# Patient Record
Sex: FEMALE | Race: WHITE | NOT HISPANIC OR LATINO | Employment: OTHER | URBAN - METROPOLITAN AREA
[De-identification: names, ages, dates, MRNs, and addresses within clinical notes are randomized per-mention and may not be internally consistent; named-entity substitution may affect disease eponyms.]

---
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
## 2019-09-24 ENCOUNTER — TRANSFERRED RECORDS (OUTPATIENT)
Dept: HEALTH INFORMATION MANAGEMENT | Facility: CLINIC | Age: 19
End: 2019-09-24

## 2019-11-07 NOTE — ED AVS SNAPSHOT
[facility], [place], Emergency Department    [address]    [facility] [address]    Phone:  [phone]    Fax:  [phone]                                       Sadaf Ross   MRN: [id number]    Department:  [facility], Emergency Department   Date of Visit:  7/17/2017           After Visit Summary Signature Page     I have received my discharge instructions, and my questions have been answered. I have discussed any challenges I see with this plan with the nurse or doctor.    ..........................................................................................................................................  Patient/Patient Representative Signature      ..........................................................................................................................................  Patient Representative Print Name and Relationship to Patient    ..................................................               ................................................  Date                                            Time    ..........................................................................................................................................  Reviewed by Signature/Title    ...................................................              ..............................................  Date                                                            Time           Depth Of Tumor Invasion (For Histology): dermis

## 2019-11-18 ENCOUNTER — HOSPITAL ENCOUNTER (EMERGENCY)
Facility: CLINIC | Age: 19
Discharge: HOME | End: 2019-11-18
Attending: EMERGENCY MEDICINE | Admitting: EMERGENCY MEDICINE
Payer: COMMERCIAL

## 2019-11-18 VITALS
DIASTOLIC BLOOD PRESSURE: 68 MMHG | TEMPERATURE: 97.3 F | SYSTOLIC BLOOD PRESSURE: 138 MMHG | RESPIRATION RATE: 16 BRPM | HEART RATE: 68 BPM | OXYGEN SATURATION: 100 %

## 2019-11-18 DIAGNOSIS — F43.29 ADJUSTMENT DISORDER WITH MIXED EMOTIONAL FEATURES: ICD-10-CM

## 2019-11-18 LAB
AMPHETAMINES UR QL SCN: NEGATIVE
BARBITURATES UR QL: NEGATIVE
BENZODIAZ UR QL: NEGATIVE
CANNABINOIDS UR QL SCN: NEGATIVE
COCAINE UR QL: NEGATIVE
ETHANOL UR QL SCN: NEGATIVE
HCG UR QL: NEGATIVE
OPIATES UR QL SCN: NEGATIVE

## 2019-11-18 PROCEDURE — 99284 EMERGENCY DEPT VISIT MOD MDM: CPT | Mod: Z6 | Performed by: EMERGENCY MEDICINE

## 2019-11-18 PROCEDURE — 80320 DRUG SCREEN QUANTALCOHOLS: CPT | Performed by: FAMILY MEDICINE

## 2019-11-18 PROCEDURE — 99283 EMERGENCY DEPT VISIT LOW MDM: CPT | Performed by: EMERGENCY MEDICINE

## 2019-11-18 PROCEDURE — 81025 URINE PREGNANCY TEST: CPT | Performed by: FAMILY MEDICINE

## 2019-11-18 PROCEDURE — 80307 DRUG TEST PRSMV CHEM ANLYZR: CPT | Performed by: FAMILY MEDICINE

## 2019-11-18 RX ORDER — VENLAFAXINE HYDROCHLORIDE 150 MG/1
300 CAPSULE, EXTENDED RELEASE ORAL DAILY
Status: ON HOLD | COMMUNITY
Start: 2019-08-09 | End: 2019-11-29

## 2019-11-18 NOTE — DISCHARGE INSTRUCTIONS
Thank you for coming to the [facility] Emergency Department.     Please continue your medications without change.   Contact your therapist to arrange a visit as soon as possible.     Please see your primary care doctor for medical check up if having any new medical concerns.     Return to the ER at any time if you have thoughts of suicide or if self-injurious behavior is worsening.

## 2019-11-18 NOTE — ED AVS SNAPSHOT
[facility], [place], Emergency Department  [address]  [facility] [address]  Phone:  [phone]  Fax:  [phone]                                    Sadaf Ross   MRN: [id number]    Department:  [facility], Emergency Department   Date of Visit:  11/18/2019           After Visit Summary Signature Page    I have received my discharge instructions, and my questions have been answered. I have discussed any challenges I see with this plan with the nurse or doctor.    ..........................................................................................................................................  Patient/Patient Representative Signature      ..........................................................................................................................................  Patient Representative Print Name and Relationship to Patient    ..................................................               ................................................  Date                                   Time    ..........................................................................................................................................  Reviewed by Signature/Title    ...................................................              ..............................................  Date                                               Time          22EPIC Rev 08/18

## 2019-11-18 NOTE — ED NOTES
Bed: HW03  Expected date: 11/18/19  Expected time: 1:23 PM  Means of arrival: Ambulance  Comments:  634 20yo female, suicidal, calm and cooperative

## 2019-11-18 NOTE — ED TRIAGE NOTES
Pt endorses SI today after her sister leaving for grandparents house, wanted to go with sister. Self harm with pin and biting self. Hx of SI.

## 2019-11-19 ENCOUNTER — HOSPITAL ENCOUNTER (INPATIENT)
Facility: CLINIC | Age: 19
LOS: 9 days | Discharge: HOME | DRG: 885 | End: 2019-11-29
Attending: PSYCHIATRY & NEUROLOGY | Admitting: PSYCHIATRY & NEUROLOGY
Payer: COMMERCIAL

## 2019-11-19 DIAGNOSIS — R45.851 SUICIDAL IDEATION: ICD-10-CM

## 2019-11-19 DIAGNOSIS — F60.3 BORDERLINE PERSONALITY DISORDER (H): ICD-10-CM

## 2019-11-19 DIAGNOSIS — F79 INTELLECTUAL DISABILITY: ICD-10-CM

## 2019-11-19 DIAGNOSIS — F17.200 NICOTINE USE DISORDER: Primary | ICD-10-CM

## 2019-11-19 DIAGNOSIS — F33.41 MAJOR DEPRESSIVE DISORDER, RECURRENT EPISODE, IN PARTIAL REMISSION (H): ICD-10-CM

## 2019-11-19 DIAGNOSIS — R45.851 SUICIDAL THOUGHTS: ICD-10-CM

## 2019-11-19 DIAGNOSIS — R45.850 HOMICIDAL IDEATION: ICD-10-CM

## 2019-11-19 PROCEDURE — 99285 EMERGENCY DEPT VISIT HI MDM: CPT | Mod: Z6 | Performed by: PSYCHIATRY & NEUROLOGY

## 2019-11-19 PROCEDURE — 80320 DRUG SCREEN QUANTALCOHOLS: CPT | Performed by: PSYCHIATRY & NEUROLOGY

## 2019-11-19 PROCEDURE — 90791 PSYCH DIAGNOSTIC EVALUATION: CPT

## 2019-11-19 PROCEDURE — 81025 URINE PREGNANCY TEST: CPT | Performed by: PSYCHIATRY & NEUROLOGY

## 2019-11-19 PROCEDURE — 25000132 ZZH RX MED GY IP 250 OP 250 PS 637

## 2019-11-19 PROCEDURE — 99285 EMERGENCY DEPT VISIT HI MDM: CPT | Mod: 25

## 2019-11-19 PROCEDURE — 80307 DRUG TEST PRSMV CHEM ANLYZR: CPT | Performed by: PSYCHIATRY & NEUROLOGY

## 2019-11-19 RX ORDER — OLANZAPINE 10 MG/1
TABLET, ORALLY DISINTEGRATING ORAL
Status: COMPLETED
Start: 2019-11-19 | End: 2019-11-19

## 2019-11-19 RX ORDER — OLANZAPINE 10 MG/2ML
10 INJECTION, POWDER, FOR SOLUTION INTRAMUSCULAR ONCE
Status: DISCONTINUED | OUTPATIENT
Start: 2019-11-19 | End: 2019-11-20 | Stop reason: CLARIF

## 2019-11-19 RX ADMIN — OLANZAPINE 10 MG: 10 TABLET, ORALLY DISINTEGRATING ORAL at 19:46

## 2019-11-19 NOTE — ED PROVIDER NOTES
[facility] EMERGENCY DEPARTMENT ([facility])     November 18, 2019    History     Chief Complaint   Patient presents with     Suicidal     HPI  Sadaf Ross is a 19 year old female with a history of bipolar disorder.  She gets mental health care through [facility] with a psychiatrist and also a therapist. Her next therapy appointment is 12/11/2019.  She is treated with Effexor and Seroquel.  The patient has been doing well.  She has been living at her mom and dad's house with her little sister.  She is been able to be functional enough to take classes in to become a  and has been compliant with her meds and appointments.  The last 2 days have been hard for her.  She has been tearful and crying triggered by the fact that her sister left to go on a vacation for 3 weeks and she misses her a lot.  Today when she was upset, she did some mild self-injury with scratching and biting her left wrist.  She does not have any deep wounds anything that requires closure.  She does not have any other thoughts of hurting herself.  She denies to me a history of intentional suicide attempts.  No psychotic features.  Denies recent cough, cold, URI symptoms or other new infectious concerns.  In addition to bipolar, on her chart is a diagnosis of ADHD.  Since being here in the Emergency Department she feels calm and she has not continued to attempt to hurt herself. She does not have a plan to hurt herself and feels safe in her home.  She is forward directed and interested in going home getting some sleep and going to class tomorrow.  She reports her appetite and sleeping have been normal.      This part of the medical record was transcribed by Divya Snyder Medical Scribe, from a dictation done by Christine Murphy MD.     Past Medical History:   Diagnosis Date     ADHD (attention deficit hyperactivity disorder)      No past surgical history on file.    No family history on file.    Social History      Tobacco Use     Smoking status: Current Some Day Smoker     Years: 5.00     Smokeless tobacco: Never Used   Substance Use Topics     Alcohol use: No     No current facility-administered medications for this encounter.      Current Outpatient Medications   Medication     hydrOXYzine (ATARAX) 10 MG tablet     QUEtiapine (SEROQUEL XR) 300 MG 24 hr tablet     venlafaxine (EFFEXOR-XR) 150 MG 24 hr capsule     FLUoxetine HCl (PROZAC PO)     VITAMIN D, CHOLECALCIFEROL, PO     Allergies   Allergen Reactions     Penicillins Rash     I have reviewed the Medications, Allergies, Past Medical and Surgical History, and Social History in the Epic system.    Review of Systems   Constitutional: Negative for appetite change, chills and fever.   HENT: Negative.    Respiratory: Negative.    Gastrointestinal: Negative.    Skin: Positive for wound.   Psychiatric/Behavioral: Positive for dysphoric mood and self-injury. Negative for suicidal ideas.          Physical Exam   BP: 134/79  Pulse: 94  Temp: 97  F (36.1  C)  Resp: 16  SpO2: 99 %      Physical Exam   Gen:A&Ox3, no acute distress  HEENT:PERRL, no facial tenderness or wounds, head atraumatic  CV:RRR without murmurs  PULM:Clear to auscultation bilaterally  Abd:soft, nontender, nondistended. Bowel sounds present and normal  UE: scratches to left wrist  LE:no traumatic injuries, skin normal  Neuro:CN II-XII intact, strength 5/5 throughout, gait stable.   Skin: no rashes or ecchymoses  Psych: eye contact good, affect slightly flat, not responding to internal stimuli, speech linear      ED Course        Procedures             Critical Care time:  none         Labs Ordered and Resulted from Time of ED Arrival Up to the Time of Departure from the ED   HCG QUALITATIVE URINE   DRUG ABUSE SCREEN 6 CHEM DEP URINE ([facility])            Assessments & Plan (with Medical Decision Making)   Patient is a 19-year-old female with a history of bipolar disorder presenting with adjustment disorder.  Her  behavior has decompensated slightly in the last 2 days due to the stressor of her sister leaving for vacation.  She has done mild self-injury but denies ongoing suicidal ideation, no psychotic features.  She does feel that she can be safe and not harm herself in the home.  Collateral information was obtained from the patient's mother and the family feels comfortable with her coming back to the home.  The mother will come and pick her up. I will have her continue her medications, follow-up with her psychiatrist and therapist. She and her family are to monitor for increasing self-injurious behavior or return of suicidal ideation as reasons to return for reevaluation.     This part of the medical record was transcribed by Divya Snyder Medical Scribe, from a dictation done by Christine Murphy MD.     I have reviewed the nursing notes.    I have reviewed the findings, diagnosis, plan and need for follow up with the patient.    Discharge Medication List as of 11/18/2019  6:06 PM          Final diagnoses:   Adjustment disorder with mixed emotional features       11/18/2019   [facility], [place], EMERGENCY DEPARTMENT    MD KELTON Chaney Katrina Anne, MD  11/22/19 2650

## 2019-11-19 NOTE — ED NOTES
"Pt softly biting hand.  When asked what we could do to stop this self harm behavior pt said she did not know.  When asked what was causing her to be so upset she is biting herself she states,\"I just want to feel the pain.  My sister moved on Sunday and I really miss her.  She is upset that I have been harming myself.\"  When asked if her sister would want her to bite herself pt states, \"No.\"  Staff empathized with her loss and encouraged her to think of what her sister would want her to do when she thinks of self harming.  Pt agreed and stopped behavior.  "

## 2019-11-19 NOTE — ED NOTES
Patient arrives to [facility]. Psych Associate explains process and gives patient urine cup. Patient told about meeting with Mental Health  and Psychiatrist. Patient told about 2-5 hour time frame for complete evaluation.

## 2019-11-19 NOTE — ED TRIAGE NOTES
Pt was seen here yesteday but left because it was busy. Pt was BIBA from school after expressing SI and engaging in self-harm behaviors such as cutting herself with a razor which was found and confiscated by school staff. Pt is biting herself on hand and does not cooperate when instructed to stop biting/hurting self.

## 2019-11-19 NOTE — ED NOTES
Pt sent here from school, Pt attempted SIB by using pen on her R arm.  Superficial minimal red spots on her R arm from pen.  Pt stating she is missing her sister whom is away for three weeks.

## 2019-11-20 LAB
ALBUMIN SERPL-MCNC: 4 G/DL (ref 3.4–5)
ALP SERPL-CCNC: 64 U/L (ref 40–150)
ALT SERPL W P-5'-P-CCNC: ABNORMAL U/L (ref 0–50)
ANION GAP SERPL CALCULATED.3IONS-SCNC: 7 MMOL/L (ref 3–14)
AST SERPL W P-5'-P-CCNC: 29 U/L (ref 0–45)
AST SERPL W P-5'-P-CCNC: 36 U/L (ref 0–45)
B-HCG SERPL-ACNC: <1 IU/L (ref 0–5)
BASOPHILS # BLD AUTO: 0 10E9/L (ref 0–0.2)
BASOPHILS NFR BLD AUTO: 0 %
BILIRUB SERPL-MCNC: 0.5 MG/DL (ref 0.2–1.3)
BUN SERPL-MCNC: 13 MG/DL (ref 7–30)
CALCIUM SERPL-MCNC: 9.1 MG/DL (ref 8.5–10.1)
CALCIUM SERPL-MCNC: ABNORMAL MG/DL (ref 8.5–10.1)
CHLORIDE SERPL-SCNC: 112 MMOL/L (ref 94–109)
CHOLEST SERPL-MCNC: 203 MG/DL
CO2 SERPL-SCNC: 22 MMOL/L (ref 20–32)
CREAT SERPL-MCNC: 0.72 MG/DL (ref 0.52–1.04)
DIFFERENTIAL METHOD BLD: NORMAL
EOSINOPHIL # BLD AUTO: 0.1 10E9/L (ref 0–0.7)
EOSINOPHIL NFR BLD AUTO: 1.7 %
ERYTHROCYTE [DISTWIDTH] IN BLOOD BY AUTOMATED COUNT: 13 % (ref 10–15)
GFR SERPL CREATININE-BSD FRML MDRD: >90 ML/MIN/{1.73_M2}
GLUCOSE SERPL-MCNC: 80 MG/DL (ref 70–99)
HCT VFR BLD AUTO: 39.1 % (ref 35–47)
HDLC SERPL-MCNC: 55 MG/DL
HGB BLD-MCNC: 13 G/DL (ref 11.7–15.7)
LDLC SERPL CALC-MCNC: 112 MG/DL
LYMPHOCYTES # BLD AUTO: 2.2 10E9/L (ref 0.8–5.3)
LYMPHOCYTES NFR BLD AUTO: 28.7 %
MCH RBC QN AUTO: 29.7 PG (ref 26.5–33)
MCHC RBC AUTO-ENTMCNC: 33.2 G/DL (ref 31.5–36.5)
MCV RBC AUTO: 90 FL (ref 78–100)
MONOCYTES # BLD AUTO: 0.5 10E9/L (ref 0–1.3)
MONOCYTES NFR BLD AUTO: 7 %
NEUTROPHILS # BLD AUTO: 4.7 10E9/L (ref 1.6–8.3)
NEUTROPHILS NFR BLD AUTO: 62.6 %
NONHDLC SERPL-MCNC: 148 MG/DL
PLATELET # BLD AUTO: 254 10E9/L (ref 150–450)
PLATELET # BLD EST: NORMAL 10*3/UL
POTASSIUM SERPL-SCNC: 5 MMOL/L (ref 3.4–5.3)
PROT SERPL-MCNC: 7.7 G/DL (ref 6.8–8.8)
RBC # BLD AUTO: 4.37 10E12/L (ref 3.8–5.2)
RBC MORPH BLD: NORMAL
SODIUM SERPL-SCNC: 141 MMOL/L (ref 133–144)
TRIGL SERPL-MCNC: 180 MG/DL
TSH SERPL DL<=0.005 MIU/L-ACNC: 1.81 MU/L (ref 0.4–4)
WBC # BLD AUTO: 7.5 10E9/L (ref 4–11)

## 2019-11-20 PROCEDURE — 82310 ASSAY OF CALCIUM: CPT | Performed by: PSYCHIATRY & NEUROLOGY

## 2019-11-20 PROCEDURE — 36415 COLL VENOUS BLD VENIPUNCTURE: CPT | Performed by: PSYCHIATRY & NEUROLOGY

## 2019-11-20 PROCEDURE — 84443 ASSAY THYROID STIM HORMONE: CPT | Performed by: PSYCHIATRY & NEUROLOGY

## 2019-11-20 PROCEDURE — 12400001 ZZH R&B MH UMMC

## 2019-11-20 PROCEDURE — 25000132 ZZH RX MED GY IP 250 OP 250 PS 637: Performed by: PSYCHIATRY & NEUROLOGY

## 2019-11-20 PROCEDURE — 80061 LIPID PANEL: CPT | Performed by: PSYCHIATRY & NEUROLOGY

## 2019-11-20 PROCEDURE — 85025 COMPLETE CBC W/AUTO DIFF WBC: CPT | Performed by: PSYCHIATRY & NEUROLOGY

## 2019-11-20 PROCEDURE — 84450 TRANSFERASE (AST) (SGOT): CPT | Performed by: PSYCHIATRY & NEUROLOGY

## 2019-11-20 PROCEDURE — 84702 CHORIONIC GONADOTROPIN TEST: CPT | Performed by: PSYCHIATRY & NEUROLOGY

## 2019-11-20 PROCEDURE — 99222 1ST HOSP IP/OBS MODERATE 55: CPT | Mod: AI | Performed by: CLINICAL NURSE SPECIALIST

## 2019-11-20 PROCEDURE — 80053 COMPREHEN METABOLIC PANEL: CPT | Performed by: PSYCHIATRY & NEUROLOGY

## 2019-11-20 RX ORDER — HYDROXYZINE HYDROCHLORIDE 25 MG/1
25 TABLET, FILM COATED ORAL EVERY 4 HOURS PRN
Status: DISCONTINUED | OUTPATIENT
Start: 2019-11-20 | End: 2019-11-29 | Stop reason: HOSPADM

## 2019-11-20 RX ORDER — VITAMIN B COMPLEX
1000 TABLET ORAL DAILY
Status: DISCONTINUED | OUTPATIENT
Start: 2019-11-20 | End: 2019-11-29 | Stop reason: HOSPADM

## 2019-11-20 RX ORDER — TRAZODONE HYDROCHLORIDE 50 MG/1
50 TABLET, FILM COATED ORAL
Status: DISCONTINUED | OUTPATIENT
Start: 2019-11-20 | End: 2019-11-29 | Stop reason: HOSPADM

## 2019-11-20 RX ORDER — VENLAFAXINE HYDROCHLORIDE 150 MG/1
300 CAPSULE, EXTENDED RELEASE ORAL DAILY
Status: DISCONTINUED | OUTPATIENT
Start: 2019-11-20 | End: 2019-11-29 | Stop reason: HOSPADM

## 2019-11-20 RX ORDER — ACETAMINOPHEN 325 MG/1
650 TABLET ORAL EVERY 4 HOURS PRN
Status: DISCONTINUED | OUTPATIENT
Start: 2019-11-20 | End: 2019-11-29 | Stop reason: HOSPADM

## 2019-11-20 RX ORDER — OLANZAPINE 10 MG/2ML
10 INJECTION, POWDER, FOR SOLUTION INTRAMUSCULAR
Status: DISCONTINUED | OUTPATIENT
Start: 2019-11-20 | End: 2019-11-21

## 2019-11-20 RX ORDER — OLANZAPINE 10 MG/1
10 TABLET ORAL
Status: DISCONTINUED | OUTPATIENT
Start: 2019-11-20 | End: 2019-11-21

## 2019-11-20 RX ORDER — QUETIAPINE 300 MG/1
300 TABLET, FILM COATED, EXTENDED RELEASE ORAL AT BEDTIME
Status: DISCONTINUED | OUTPATIENT
Start: 2019-11-20 | End: 2019-11-21

## 2019-11-20 RX ORDER — ALUMINA, MAGNESIA, AND SIMETHICONE 2400; 2400; 240 MG/30ML; MG/30ML; MG/30ML
30 SUSPENSION ORAL EVERY 4 HOURS PRN
Status: DISCONTINUED | OUTPATIENT
Start: 2019-11-20 | End: 2019-11-29 | Stop reason: HOSPADM

## 2019-11-20 RX ADMIN — OLANZAPINE 10 MG: 10 TABLET, FILM COATED ORAL at 09:21

## 2019-11-20 RX ADMIN — QUETIAPINE FUMARATE 300 MG: 300 TABLET, EXTENDED RELEASE ORAL at 22:04

## 2019-11-20 RX ADMIN — VENLAFAXINE HYDROCHLORIDE 300 MG: 150 CAPSULE, EXTENDED RELEASE ORAL at 08:59

## 2019-11-20 RX ADMIN — MELATONIN 1000 UNITS: at 08:59

## 2019-11-20 ASSESSMENT — ACTIVITIES OF DAILY LIVING (ADL)
DRESS: 0-->INDEPENDENT
SWALLOWING: 0-->SWALLOWS FOODS/LIQUIDS WITHOUT DIFFICULTY
AMBULATION: 0-->INDEPENDENT
BATHING: 0-->INDEPENDENT
RETIRED_EATING: 0-->INDEPENDENT
RETIRED_COMMUNICATION: 0-->UNDERSTANDS/COMMUNICATES WITHOUT DIFFICULTY
TOILETING: 0-->INDEPENDENT
COGNITION: 0 - NO COGNITION ISSUES REPORTED
HYGIENE/GROOMING: PROMPTS
DRESS: PROMPTS
ORAL_HYGIENE: PROMPTS
LAUNDRY: UNABLE TO COMPLETE
FALL_HISTORY_WITHIN_LAST_SIX_MONTHS: NO
TRANSFERRING: 0-->INDEPENDENT

## 2019-11-20 ASSESSMENT — ENCOUNTER SYMPTOMS
MUSCULOSKELETAL NEGATIVE: 1
RESPIRATORY NEGATIVE: 1
AGITATION: 1
CARDIOVASCULAR NEGATIVE: 1
HYPERACTIVE: 0
DECREASED CONCENTRATION: 1
NERVOUS/ANXIOUS: 1
NEUROLOGICAL NEGATIVE: 1
GASTROINTESTINAL NEGATIVE: 1
EYES NEGATIVE: 1
ACTIVITY CHANGE: 1

## 2019-11-20 ASSESSMENT — MIFFLIN-ST. JEOR: SCORE: 1660.38

## 2019-11-20 NOTE — ED NOTES
Pt is sitting in chair in hallway, asked to use phone to call Sofia fulton at [phone].  Writer dialed phone and spoke to sister.  Pt had conversation with sister, remained calm during call.  Pt asking if ex YANETH is a patient here and stated she did not want to see him.

## 2019-11-20 NOTE — H&P
"Admitted:     11/19/2019      IDENTIFYING INFORMATION:  Sadaf Ross is a 19-year-old single  female presenting with self-injurious behavior, suicidal ideation and homicidal ideation.      CHIEF COMPLAINT:  \"I wanted to kill that girl.\"      HISTORY OF PRESENT ILLNESS:  Sadaf Ross is a 19-year-old single  female presenting to the ED from [facility] where she had thoughts of harming a school peer.  The patient reports that she found a razor blade on the floor of her counselor.  The patient is reporting stressors of ex-boyfriend was admitted to [facility] on unit 32.  The patient did not want to be admitted to this hospital.  The patient became agitated with the emergency room doctor, threatened to punch him.  The patient was given Zyprexa.  She apologized after she took Zyprexa.  The patient reports that she continues to have thoughts of wanting to harm female peer at [facility].  The patient states that this peer saw her smoking cigarettes at the mall.  The patient reports she just wanted to talk to her about why she was smoking cigarettes, but the peer was not willing to listen to her.  The patient feels that the peer was being mean to her.  The patient stated, \"I'm going to kill her.\"  Police were called to [facility].  The peer was warned of the patient's homicidal ideation towards her.  The patient reports another stressor is her younger sister went on a vacation for 3 weeks in [place].  The patient states she wanted to go with her sister, but was unable to.  The patient did go to the emergency room on 11/18.  She was tearful at that time because her sister went on vacation, but left.  The patient felt the ED was too busy.  The patient had been softly biting her hand in the emergency room.  The patient reports this is \"one of my disabilities.\"  The patient has not broken the skin.  The patient did engage in some scratching.  Patient reports another stressor is her " "father has type 1 diabetes.  She is fearful that he is going to die.  The patient endorses intellectual disability.  IQ is 82.  She presents as lower.  Patient is often very childlike in her descriptions.  Patient is her own legal guardian.      PSYCHIATRIC REVIEW OF SYSTEMS:  The patient reports that she is depressed.  She is tired all the time.  The patient stated, \"I want to kill myself.\"  She is feeling hopeless and helpless.  The patient reports homicidal ideations.  She continues to have thoughts of wanting to harm a peer at [employer].  The patient does not endorse any symptoms of monae.  She does not endorse any symptoms of psychosis including auditory or visual hallucinations or feelings of paranoia.  The patient reports she is anxious.  The patient states that other [employer] students often target her in class.  The patient denies any symptoms of PTSD, eating disorder, or OCD.      PSYCHIATRIC HISTORY:  The patient has made multiple visits to the emergency room.  The patient has 6 prior inpatient psychiatric admissions.  Her first was at age 12 due to trauma of sister's death at birth.  Most recent inpatient admission was at [facility] from 08/06 to 08/08/2019 and another admission at [facility] from 08/13 to 08/20/2019.  The patient is followed by a psychiatrist and therapist through [facility].  She has a Critical access hospital  named Gaby Alcantar.  Her next therapist appointment is on 12/01.  The patient has been managed on Seroquel and Effexor, which have been effective for her.      PAST MEDICAL HISTORY:  HCG is negative.  The patient is obese.  No acute issues.      SUBSTANCE ABUSE HISTORY:  U-tox is negative.  The patient denies any drug use.      FAMILY HISTORY:  Father is a type 1 diabetic.  The patient denies any mental health issues in her family.      SOCIAL HISTORY:  The patient lives with her parents and little sister.  The patient is in a high school transition program through [employer]" education.  She spends mornings at [employer] and afternoons at [employer] in [place].  The patient was born and raised in the [place] by her parents.  Mother works at [employer], father at a car wash.  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      MEDICAL REVIEW OF SYSTEMS:  Reviewed a 10-point systems review completed by Dr. Magno Sena MD, dated 11/19/2019.  No changes are noted.      PHYSICAL EXAMINATION:   VITAL SIGNS:  Temperature 97.3 Fahrenheit, pulse 68, blood pressure 138/68.  SpO2 is at 100%.  Reviewed documentation for physical examination completed by Dr. Magno Sena, dated 11/19/2019.  No changes are noted.      MENTAL STATUS EXAMINATION:  The patient appears younger than her stated age.  She is in scrubs.  She is somewhat disheveled.  The patient was in her room lying down.  She had a couple of teething rings in her room so she would not bite on her hand.  The patient was calm and cooperative throughout the interview.  Eye contact was adequate.  She did not display any psychomotor abnormalities.  Speech was spontaneous.  She used conversational rate, rhythm and tone.  She elaborated as best she could.  She had trouble remembering certain things and sometimes appeared confused by questions.  Mood is described as depressed.  Affect blunted and congruent.  Thought process linear.  Associations intact.  Thought content did not display any evidence of psychosis.  She endorses passive suicidal ideation, no active intent.  She endorses homicidal ideation.  The patient reports she wants to harm a peer at [employer].  Insight and judgment are impaired.  Cognition appears to be intact including orientation to person, place, time and situation, use of language and fund of knowledge.  Recent and remote memory are grossly intact.  Muscle strength, tone and gait appeared within normal limits upon observation.      ASSESSMENT:   1.  Major depressive disorder,  recurrent, severe without psychosis.   2.  Borderline personality traits.   3.  Intellectual disability, IQ estimated at 82.   4.  Suicidal ideation.   5.  Homicidal ideation.      PLAN:   1.  The patient has been admitted to behavioral unit 4A on a 72-hour hold initiated on .  Patient is reporting both suicidal and homicidal ideation at this time.  She is at risk to self and others.  We will continue hold.   2.  Discussed medications with patient.  Continue Effexor at 150 mg of Seroquel at 300 mg.  The patient has been doing well on these medications.  The patient no longer is taking Prozac.  Discussed risks, benefits and side effects of medication with patient.   3.  Psychosocial treatments to be addressed with CTC.   4.  Estimated length of stay 3-5 days.         DEBRA A. NAEGELE, APRN, CNS             D: 2019   T: 2019   MT: DINORAH      Name:     ARIANE TILLMAN   MRN:      [id number]        Account:      [id number]   :      2000        Admitted:     2019                   Document: [id number]       cc: [facility]

## 2019-11-20 NOTE — ED NOTES
"ED to Behavioral Floor Handoff    SITUATION  Sadaf Ross is a 19 year old female who speaks English and lives in a home with family members The patient arrived in the ED by ambulance from home with a complaint of Suicidal  .The patient's current symptoms started/worsened 1 week(s) ago and during this time the symptoms have increased.   In the ED, pt was diagnosed with   Final diagnoses:   Intellectual disability   Borderline personality disorder (H)   Homicidal ideation   Suicidal thoughts        Initial vitals were:     --------  Is the patient diabetic? No   If yes, last blood glucose? --     If yes, was this treated in the ED? --  --------  Is the patient inebriated (ETOH) No or Impaired on other substances? No  MSSA done? No  Last MSSA score: --    Were withdrawal symptoms treated? N/A  Does the patient have a seizure history? No. If yes, date of most recent seizure--  --------  Is the patient patient experiencing suicidal ideation? Pt has SIB and has threatened suicide by razor due to \"sister moving to [place] and break up with BF.\"    Homicidal ideation? denies current or recent homicidal ideation or behaviors.    Self-injurious behavior/urges? reports current or recent self injurious behavior or ideation including Biting hand and stabbing arm with pen..  ------  Was pt aggressive in the ED Yes  Was a code called No  Is the pt now cooperative? Yes  -------  Meds given in ED:   Medications   OLANZapine (zyPREXA) injection 10 mg (10 mg Intramuscular Not Given 11/19/19 1958)   OLANZapine zydis (zyPREXA) 10 MG ODT tab (10 mg  Given 11/19/19 1946)      Family present during ED course? No  Family currently present? No    BACKGROUND  Does the patient have a cognitive impairment or developmental disability? Yes  Allergies:   Allergies   Allergen Reactions     Penicillins Rash   .   Social demographics are   Social History     Socioeconomic History     Marital status: Single     Spouse name: Not on file     " Number of children: Not on file     Years of education: Not on file     Highest education level: Not on file   Occupational History     Not on file   Social Needs     Financial resource strain: Not on file     Food insecurity:     Worry: Not on file     Inability: Not on file     Transportation needs:     Medical: Not on file     Non-medical: Not on file   Tobacco Use     Smoking status: Current Some Day Smoker     Years: 5.00     Smokeless tobacco: Never Used   Substance and Sexual Activity     Alcohol use: No     Drug use: No     Sexual activity: Never   Lifestyle     Physical activity:     Days per week: Not on file     Minutes per session: Not on file     Stress: Not on file   Relationships     Social connections:     Talks on phone: Not on file     Gets together: Not on file     Attends Holiness service: Not on file     Active member of club or organization: Not on file     Attends meetings of clubs or organizations: Not on file     Relationship status: Not on file     Intimate partner violence:     Fear of current or ex partner: Not on file     Emotionally abused: Not on file     Physically abused: Not on file     Forced sexual activity: Not on file   Other Topics Concern     Not on file   Social History Narrative     Not on file        ASSESSMENT  Labs results   Labs Ordered and Resulted from Time of ED Arrival Up to the Time of Departure from the ED   DRUG ABUSE SCREEN 6 CHEM DEP URINE ([facility])   HCG QUALITATIVE URINE      Imaging Studies: No results found for this or any previous visit (from the past 24 hour(s)).   Most recent vital signs There were no vitals taken for this visit.   Abnormal labs/tests/findings requiring intervention:---   Pain control: pt had none  Nausea control: pt had none    RECOMMENDATION  Are any infection precautions needed (MRSA, VRE, etc.)? No If yes, what infection? --  ---  Does the patient have mobility issues? independently. If yes, what device does the pt use? ---  ---  Is  patient on 72 hour hold or commitment? Yes If on 72 hour hold, have hold and rights been given to patient? Yes  Are admitting orders written if after 10 p.m. ?N/A  Tasks needing to be completed:---     Tiffanie Monzon RN   [phone] [facility]

## 2019-11-20 NOTE — PROGRESS NOTES
"S: 19  year old pt admitted to 4A from [facility] ED for SI,HI, SIB. PTA medication on file reordered.       B: Upon arrival to the unit, pt was searched by two female staff. Pt was cooperative with the search, and vitals. Patient was very tired and declined the admission interview.    Per DEC assessment: Pt found a razor at school and took it with her to [employer] [place] where she threatened to use it on a peer named Sadaf PANTOJA  because she was \"talking shit\" about this patient. Per DEC assessment patients mother says pt makes threats like that but has never acted on them.   Patient reports feeling stressed d/t her 16 year old sister going on a trip to [place]. Patient's ex-boyfriend is on station 30 currently.     Patient does have an intellectual disability with an IQ of 82. SHe is her own legal guardian.     Patient has a hx of hand biting and head banging. While in the ED/[facility] [place] patient engaged in SIB in the form of biting her hands.    Utox was negative.  Patient is on a 72-HH which started 11/19/19 22;54 and ends 11/22/19 at 22:45.     A: patient cooperative with the search and able to tell this writer what mediation she takes at night time.     R: Status 15 checks initiated per unit policy. Contracts for safety. Suicide and self injury precautions initiated. Promote mental health and safety. Admission profile is incomplete at this time.   "

## 2019-11-20 NOTE — PLAN OF CARE
Problem: Behavior Regulation (Impulse Control) Impairment (Nonsuicidal Self-injury)  Goal: Improved Behavior Regulation and Impulse Control (Nonsuicidal Self-injury)  Outcome: No Change     Upon introducing self to Sadaf, she started biting on right hand, offered teething rings, administered PRN Zyprexa 10 mg, reassurance, and breakfast tray. She kept her head down with hair hiding her face. She did eventually stop with offers for juice. Maintaining minimal stimulus.     Sadaf endorses suicidal ideation without a plan. She does not know if she can keep herself safe, but is willing to come to staff. She is repeating she wants to die. She also continues to have HI towards Sadaf BLACK for bullying her.     Sadaf is difficult to interview and appears tense with writer trying to have a conversation. . She mumbles minimal words, no eye contact, keeping her arm covering her face, writhing in bed. She either bites her hands as a self soothing or harder with more difficult questions.     She has remained in bed all shift, declining trays.   Confirmed current meds with [facility] [phone]  No Prozac

## 2019-11-20 NOTE — PLAN OF CARE
BEHAVIORAL TEAM DISCUSSION    Participants: 4A Provider: Debra Naegele, APRN, CNS; 4A RN's: Demetria Kent, RN; 4A CTC's: Jenni Ibarra, (CTC); Farida Eric, OTR; Gela Bauman, Nursing Supervisor.  Progress: Continuing to Assess.  Continued Stay Criteria/Rationale: New Patient  Medical/Physical: None  Precautions:    Behavioral Orders   Procedures    Code 1 - Restrict to Unit    Routine Programming     As clinically indicated    Self Injury Precaution    Status 15     Every 15 minutes.    Suicide precautions     Patients on Suicide Precautions should have a Combination Diet ordered that includes a Diet selection(s) AND a Behavioral Tray selection for Safe Tray - with utensils, or Safe Tray - NO utensils       Plan: CTC will meet with pt to complete psychosocial assessment. CTC will coordinate disposition and aftercare planning.  The following services will be provided to the patient; psychiatric assessment, medication management, therapeutic milieu, individual and group support, art therapy, and skills/OT groups.   Rationale for change in precautions or plan: No Change.

## 2019-11-20 NOTE — ED PROVIDER NOTES
History     Chief Complaint   Patient presents with     Suicidal     The history is provided by the patient and medical records.     Sadaf Ross is a 19 year old female who is here via EMS from [facility] where she allegedly had thoughts of harming a school peer. She had found a razor on the ground while on her way to school. She punched a wall instead. Patient reports wanting to be admitted as she feels unsafe going home. She did not want to be brought here as her ex-boyfriend was admitted yesterday here. (he is on station 32N). He is violent. She was requesting to be sent to another hospital. When told that I could not do that, she threatened to punch me and made repeated threats of harm if she is not sent to another hospital. I offered to have her stay in the ED overnight and be re-evaluated in the morning. She was fixated on wanting to be sent to another hospital and continued to threaten bodily harm. I offered her Zyprexa oral versus an injection and told her that violence and threats will not be tolerated. Valeria levine apologized after she took a dose of Zyprexa. She was sent over to the ED as she felt she could not maintain emotional control.     Patient has history of borderline personality disorder and intellectual disability. She has been living with parents past month. Parents reports patient has a long history of making threats of harm to herself and towards others when upset and angry.    Please see DEC Crisis Assessment on 11/19/19 in Epic for further details.    PERSONAL MEDICAL HISTORY  Past Medical History:   Diagnosis Date     ADHD (attention deficit hyperactivity disorder)      PAST SURGICAL HISTORY  No past surgical history on file.  FAMILY HISTORY  No family history on file.  SOCIAL HISTORY  Social History     Tobacco Use     Smoking status: Current Some Day Smoker     Years: 5.00     Smokeless tobacco: Never Used   Substance Use Topics     Alcohol use: No     MEDICATIONS  Current  Facility-Administered Medications   Medication     OLANZapine (zyPREXA) injection 10 mg     Current Outpatient Medications   Medication     FLUoxetine HCl (PROZAC PO)     hydrOXYzine (ATARAX) 10 MG tablet     QUEtiapine (SEROQUEL XR) 300 MG 24 hr tablet     venlafaxine (EFFEXOR-XR) 150 MG 24 hr capsule     VITAMIN D, CHOLECALCIFEROL, PO     ALLERGIES  Allergies   Allergen Reactions     Penicillins Rash         I have reviewed the Medications, Allergies, Past Medical and Surgical History, and Social History in the Epic system.    Review of Systems   Constitutional: Positive for activity change.   HENT: Negative.    Eyes: Negative.    Respiratory: Negative.    Cardiovascular: Negative.    Gastrointestinal: Negative.    Genitourinary: Negative.    Musculoskeletal: Negative.    Skin: Negative.    Neurological: Negative.    Psychiatric/Behavioral: Positive for agitation, behavioral problems, decreased concentration and suicidal ideas. The patient is nervous/anxious. The patient is not hyperactive.    All other systems reviewed and are negative.      Physical Exam          Physical Exam  Vitals signs and nursing note reviewed.   HENT:      Head: Normocephalic and atraumatic.      Nose: Nose normal.      Mouth/Throat:      Mouth: Mucous membranes are moist.   Eyes:      Pupils: Pupils are equal, round, and reactive to light.   Neck:      Musculoskeletal: Normal range of motion.   Cardiovascular:      Rate and Rhythm: Normal rate and regular rhythm.   Pulmonary:      Effort: Pulmonary effort is normal.      Breath sounds: Normal breath sounds.   Abdominal:      General: Abdomen is flat.   Musculoskeletal: Normal range of motion.   Skin:     General: Skin is warm.   Neurological:      General: No focal deficit present.      Mental Status: She is alert.   Psychiatric:         Attention and Perception: She is inattentive. She does not perceive auditory or visual hallucinations.         Mood and Affect: Mood normal. Affect is  labile and inappropriate.         Speech: Speech normal.         Behavior: Behavior is agitated.         Thought Content: Thought content includes homicidal and suicidal ideation.         Judgment: Judgment is impulsive and inappropriate.         ED Course        Procedures             Labs Ordered and Resulted from Time of ED Arrival Up to the Time of Departure from the ED   DRUG ABUSE SCREEN 6 CHEM DEP URINE ([facility])   HCG QUALITATIVE URINE            Assessments & Plan (with Medical Decision Making)   Patient with borderline intellectual functioning who is upset and is threatening harm to self and others. She is too fixated on wanting admission, preferably to another hospital that it is not possible to work on an outpatient intervention. Patient will be referred for admission. She is placed on a 72 hour hold.    I have reviewed the nursing notes.    I have reviewed the findings, diagnosis, plan and need for follow up with the patient.    New Prescriptions    No medications on file       Final diagnoses:   Intellectual disability   Borderline personality disorder (H)   Homicidal ideation   Suicidal thoughts       11/19/2019   [facility], [place], EMERGENCY DEPARTMENT     Magno Sena MD  11/20/19 0049

## 2019-11-20 NOTE — ED NOTES
Pt returned from [facility], Pt remains calm in hallway.  Pt started biting her hand, writer was able to de-escalate Pt.  Pt willing to stay in hallway and be calm.

## 2019-11-20 NOTE — ED NOTES
Pt threatened MD and was posturing with folded fists, pt agreed to talk with writer and eventually took zyprexa. Pt apologized to MD but is not naeem for safe behaviors here in the [facility], despite staff explaining to her that it is busy in ER with lots of stimulation that can cause her to further dysregulate. Pt stated that she can be safe in the ER. Will traNsfer back to main ED, report given.

## 2019-11-20 NOTE — ED NOTES
Patient yelling, crying, biting her hand. Staff expressed to patient over and over the importance of keeping her safe in our department. Patient kept stating she wants to be in the ER because we cannot keep her safe here. Staff assured her safety. Patient stated she will esclate if she stays in [facility].

## 2019-11-20 NOTE — PROGRESS NOTES
11/20/19 0015   Patient Belongings   Did you bring any home meds/supplements to the hospital?  Yes   Disposition of meds  Sent to security/pharmacy per site process   Patient Belongings sent to security per site process;locker;remains with patient   Patient Belongings Remaining with Patient ring   Patient Belongings Put in Hospital Secure Location (Security or Locker, etc.) cash/credit card;clothing;glasses;plastic bag;purse/wallet;shoes;tote;other (see comments)  (tote: black pack. there was a computer tablet. There was no cell phone. )   Belongings Search Yes   Clothing Search Yes   Second Staff Gloria   Comment Patient came in with a blue baseball hat, blue indio pant, yellow t-shirt, white pair of sock, hair tie, one ring, a blue tank top and black back pack (small red flash light, wallet, headphone, cigarette lighter,lip balm, a small clear bag of candy, two cigarette sticks, small pouch of coins, sun glasses, small red case red containing headphone, a worker eye shield glasses, a hooded sweater, blue short, toiletries, snacks, a bandana, a white pair of tennis shoe, a pink pair of tennis shoe, a pink computer tablet, pink folder, note book), one EBT card...3023, One single $2 bill.  Card and two dollar bill in secured envelope # [id number] sent to [facility] safe.         A               Admission:  I am responsible for any personal items that are not sent to the safe or pharmacy.  [facility] is not responsible for loss, theft or damage of any property in my possession.    Signature:  _________________________________ Date: _______  Time: _____                                              Staff Signature:  ____________________________ Date: ________  Time: _____      2nd Staff person, if patient is unable/unwilling to sign:    Signature: ________________________________ Date: ________  Time: _____     Discharge:  Keith has returned all of my personal belongings:    Signature:  _________________________________ Date: ________  Time: _____                                          Staff Signature:  ____________________________ Date: ________  Time: _____

## 2019-11-20 NOTE — PLAN OF CARE
The patient specific goals include:   Patient will participate in unit programming  Patient will identify triggers and positive coping skills  Patient will take medications as prescribed by physician both for mental health and medical  Patient coached to work on coping packet    The patient identified the following reasons for hospitalization:   Inability to contract for safety  Homicidal ideations    The patient identified the following goals for discharge:  Pt did not identify any goals for discharge.

## 2019-11-20 NOTE — PROGRESS NOTES
Patient requested to me woken up for missed HS medication.     Patient refused medication once it was available.

## 2019-11-20 NOTE — PROGRESS NOTES
Initial Psychosocial Assessment    I have reviewed the chart, met with the patient, and developed Care Plan.      Patient Legal (Hospital) Status: 72 HH Ends 11/22/19@1477    Presenting Problem: Sadaf Ross is a 19 year old female who is here via EMS from [facility] where she allegedly had thoughts of harming a school peer. She had found a razor on the ground while on her way to school. She punched a wall instead. Patient reports wanting to be admitted as she feels unsafe going home. She did not want to be brought here as her ex-boyfriend was admitted yesterday here. (he is on station [address]). He is violent. She was requesting to be sent to another hospital. When told that I could not do that, she threatened to punch me and made repeated threats of harm if she is not sent to another hospital. I offered to have her stay in the ED overnight and be re-evaluated in the morning. She was fixated on wanting to be sent to another hospital and continued to threaten bodily harm. I offered her Zyprexa oral versus an injection and told her that violence and threats will not be tolerated. Valeria levine apologized after she took a dose of Zyprexa. She was sent over to the ED as she felt she could not maintain emotional control.      Patient has history of borderline personality disorder and intellectual disability. She has been living with parents past month. Parents reports patient has a long history of making threats of harm to herself and towards others when upset and angry.       Mental health history: Hx of depression, Intellectual Disability (IQ 82), Borderline Personality Disorder, ADHD. 6 past hospitalizations for mental health. First hospitalization at age 12 on Child/Adolescent at [facility] Most recent hospitalizations: [facility] 8/6/19-8/8/19 and [facility] 8/13/19-8/21/19 and multiple ED visits. No past suicide attempts. Pt has been biting herself and head banging recently. Pt is currently has a therapist, medication  management,  and participates in a Career and life transition program in [place].     Chemical use history: None reported    Family Description (Constellation, Family Psychiatric History):  Patient grew up in the [facility]. She was raised by biological parents. Parents are . Pt has two living siblings, one younger and one older. Pt had another sibling that  at birth when pt was 12. Depression and anxiety on maternal side of the family.      Significant Life Events (Illness, Abuse, Trauma, Death):  The death of pt's younger sister at her birth seems to be a traumatic life event for pt. She was 12 when this happened at was also hospitalized for the first time at age 12    Living Situation:  Patient has been living with her family for the past month. She is looking to her own place soon.     Educational Background:  Pt is in a high school transitions program where she attends [employer] in the mornings and then goes to [employer] in [place], [place].     Occupational History:  Unemployeed    Financial Status:  Medical Assistance.     Legal Issues:  Patient denies     Ethnic/Cultural Issues:  Cacuasion    Spiritual Orientation:  None identified     Service History:  Denies    Current Treatment Providers are:  Medication provider: [email or web address] [place]  Therapist: [email or web address] [place]  : [email or web address]    Social Service Assessment/Social Functioning/Plan:  Patient has been admitted for psychiatric stabilization. Patient will have psychiatric assessment and medication management by the psychiatrist. Medications will be reviewed and adjusted per MD as indicated. The treatment team will continue to assess and stabilize the patient's mental health symptoms with the use of medications and therapeutic programming. Hospital staff will provide a safe environment and a therapeutic  milieu. Staff will continue to assess patient as needed. Patient will participate in unit groups and activities. Patient will receive individual and group support on the unit.  CTC will do individual inpatient treatment planning and after care planning. CTC will discuss options for increasing community supports with the patient. CTC will coordinate with outpatient providers and will place referrals to ensure appropriate follow up care is in place.  Patient would benefit from: Short hospitalization and returning to providers and school programming.

## 2019-11-21 PROCEDURE — H2032 ACTIVITY THERAPY, PER 15 MIN: HCPCS

## 2019-11-21 PROCEDURE — 25000132 ZZH RX MED GY IP 250 OP 250 PS 637: Performed by: CLINICAL NURSE SPECIALIST

## 2019-11-21 PROCEDURE — 12400001 ZZH R&B MH UMMC

## 2019-11-21 PROCEDURE — G0177 OPPS/PHP; TRAIN & EDUC SERV: HCPCS

## 2019-11-21 PROCEDURE — 25000132 ZZH RX MED GY IP 250 OP 250 PS 637: Performed by: PSYCHIATRY & NEUROLOGY

## 2019-11-21 PROCEDURE — 99232 SBSQ HOSP IP/OBS MODERATE 35: CPT | Performed by: CLINICAL NURSE SPECIALIST

## 2019-11-21 RX ORDER — OLANZAPINE 10 MG/2ML
10 INJECTION, POWDER, FOR SOLUTION INTRAMUSCULAR 3 TIMES DAILY PRN
Status: DISCONTINUED | OUTPATIENT
Start: 2019-11-21 | End: 2019-11-29 | Stop reason: HOSPADM

## 2019-11-21 RX ORDER — OLANZAPINE 5 MG/1
5-10 TABLET ORAL 3 TIMES DAILY PRN
Status: DISCONTINUED | OUTPATIENT
Start: 2019-11-21 | End: 2019-11-29 | Stop reason: HOSPADM

## 2019-11-21 RX ORDER — QUETIAPINE 400 MG/1
400 TABLET, FILM COATED, EXTENDED RELEASE ORAL AT BEDTIME
Status: DISCONTINUED | OUTPATIENT
Start: 2019-11-21 | End: 2019-11-29 | Stop reason: HOSPADM

## 2019-11-21 RX ADMIN — MELATONIN 1000 UNITS: at 09:00

## 2019-11-21 RX ADMIN — VENLAFAXINE HYDROCHLORIDE 300 MG: 150 CAPSULE, EXTENDED RELEASE ORAL at 09:00

## 2019-11-21 RX ADMIN — QUETIAPINE 400 MG: 400 TABLET, FILM COATED, EXTENDED RELEASE ORAL at 21:07

## 2019-11-21 RX ADMIN — OLANZAPINE 5 MG: 5 TABLET, FILM COATED ORAL at 19:13

## 2019-11-21 ASSESSMENT — ACTIVITIES OF DAILY LIVING (ADL)
ORAL_HYGIENE: INDEPENDENT
DRESS: INDEPENDENT
HYGIENE/GROOMING: INDEPENDENT

## 2019-11-21 NOTE — PROGRESS NOTES
"[facility], [place]   Psychiatric Progress Note        Interim History:   The patient's care was discussed with the treatment team during the daily team meeting and/or staff's chart notes were reviewed.  Staff report patient has been isolating to her room and sleeping in bed. .       Psychiatric symptoms and interventions:  Patient states she will choke her peer at [employer] or throw her under a train.   Patient reports she will kill herself if she leaves the hospital. Patient states she does not want to leave the hospital. She has been isolating to her room, restricting her intake and she is malodorous. Patient perseverates on certain ideas. She has limited understanding and needs explained to her in a very simplistic manner.     Increased Seroquel form 300 to 400 mg to address mood instability.     Medical: Hcg is negative, Admission labs unremarkable.     Behavioral/psychology/social:  Encouraged patient to attend therapeutic hospital programming as tolerated.   No room mate order due to her making threatening statements about her peer at [employer] Patient is impulsive.          Medications:       influenza vaccine adult (product based on age)  0.5 mL Intramuscular Prior to discharge     QUEtiapine ER  300 mg Oral At Bedtime     venlafaxine  300 mg Oral Daily     Vitamin D3  1,000 Units Oral Daily          Allergies:     Allergies   Allergen Reactions     Penicillins Rash          Labs:     Recent Results (from the past 24 hour(s))   AST    Collection Time: 11/20/19 11:17 AM   Result Value Ref Range    AST 29 0 - 45 U/L   Calcium    Collection Time: 11/20/19 11:17 AM   Result Value Ref Range    Calcium 9.1 8.5 - 10.1 mg/dL          Psychiatric Examination:     /85   Pulse 60   Temp 97.5  F (36.4  C) (Tympanic)   Resp 16   Ht 1.626 m (5' 4\")   Wt 90.5 kg (199 lb 9.6 oz)   SpO2 97%   BMI 34.26 kg/m    Weight is 199 lbs 9.6 oz  Body mass index is 34.26 " kg/m .  Orthostatic Vitals       Most Recent      Sitting Orthostatic /85 11/20 1300    Sitting Orthostatic Pulse (bpm) 60 11/20 1300    Standing Orthostatic /84 11/20 1300    Standing Orthostatic Pulse (bpm) 71 11/20 1300        Appearance: awake, alert and disheveled   Attitude:  guarded and uncooperative  Eye Contact:  fair  Mood:  depressed  Affect:  intensity is blunted  Speech:  normal prosody  Psychomotor Behavior:  no evidence of tardive dyskinesia, dystonia, or tics  Throught Process:  linear  Associations:  no loose associations  Thought Content:  passive suicidal ideation present, Patient talks about choking her peer at [employer].   Insight:  limited  Judgement:  limited  Oriented to:  time, person, and place  Attention Span and Concentration:  fair  Recent and Remote Memory:  fair    Clinical Global Impressions  First:  Considering your total clinical experience with this particular patient population, how severe are the patient's symptoms at this time?: 3 (11/20/19 1548)  Compared to the patient's condition at the START of treatment, this patient's condition is:: 3 (11/20/19 1548)  Most recent:  Considering your total clinical experience with this particular patient population, how severe are the patient's symptoms at this time?: 3 (11/20/19 1548)  Compared to the patient's condition at the START of treatment, this patient's condition is:: 3 (11/20/19 1548)         Precautions:     Behavioral Orders   Procedures     Assault precautions     Code 1 - Restrict to Unit     Routine Programming     As clinically indicated     Self Injury Precaution     Status 15     Every 15 minutes.     Suicide precautions     Patients on Suicide Precautions should have a Combination Diet ordered that includes a Diet selection(s) AND a Behavioral Tray selection for Safe Tray - with utensils, or Safe Tray - NO utensils            DIagnoses:   1.  Major depressive disorder, recurrent, severe without  psychosis.   2.  Borderline personality traits.   3.  Intellectual disability, IQ estimated at 82.   4.  Suicidal ideation.   5.  Homicidal ideation.          Plan:     Legal status: Discontinued 72 hour hold. Patient signed in voluntary     Medication management: Wellbutrin 150 mg, Seroquel 400 mg.     Disposition plan: Stabilize with medications, return to home. Patient has therapy  (next appt is on 12/1) and psychiatric through [facility].

## 2019-11-21 NOTE — PROGRESS NOTES
11/21/19 1200   Behavioral Health   Hallucinations denies / not responding to hallucinations   Thinking poor concentration   Orientation person: oriented;place: oriented   Memory baseline memory   Insight poor   Judgement impaired   Eye Contact at floor;at examiner   Affect blunted, flat   Mood depressed;anxious   Physical Appearance/Attire appears stated age   Hygiene neglected grooming - unclean body, hair, teeth   Suicidality thoughts only   1. Wish to be Dead (Recent) Yes   2. Non-Specific Active Suicidal Thoughts (Recent) Yes   Self Injury active  (biting hand, skin not broken)   Elopement Statements about wanting to leave   Activity withdrawn   Speech clear;other (see comments)  (mumbling)   Medication Sensitivity no stated side effects   Psychomotor / Gait balanced;steady     Pt was observed in the milieu for lunch (slept through breakfast), and sat in OT group but did not participate. Pt stated her food is ok, has been sleeping more than normal for her, and has no medication complaints.  Pt stated she does not feel safe here due to running into her ex-boyfriend in the ED.  Writer explained to Pt that he cannot ever come up to this unit and promised her she will not see him here again.  Pt answered Yes to Q1 and Q2 of the CSSA, endorsing a wish to be dead and non-specific ideation (RN notified).  Pt active SIB today, stating she has been biting her hand and attempting to break the skin, but could not as it hurt too much to do so; skin was not broken upon inspection (RN notified).  Pt denies AH/VH.  Pt rated depression and anxiety at 9.

## 2019-11-21 NOTE — PLAN OF CARE
INITIAL OT NOTE  Problem: OT General Care Plan  Goal: OT Goal 1    Pt attended 1 out of 2 OT groups offered. Pt attended OT clinic for part of the duration but did not participate (no charge). Pt responded with minimal gestures and responses such as head nodding and 'yes/no' answers. Pt observed biting hand at times and does not work on any task. Pt participated in structured occupational therapy group with a focus on Jodi finding via video, discussion, and creative expression. Encouraged patients to create a list of places, people, activities, and things that bring them jodi. Pt remained in the room for the full duration, and followed along with the group but did not share anything verbally. Pt did select a song during the group and was observed singing to it. Pt socializes with peers more this PM.

## 2019-11-21 NOTE — PROGRESS NOTES
Patient declined to come out of her room , and was sleeping through the shift. She would turn around, but never looked at staff, or responded to questions. During dinner, patient responded that she was coming out for dinner, but never came out. She appears sedated, displays blunted affect, declines prompts, and isolates in her room.     11/20/19 2043   Behavioral Health   Hallucinations other (see comment)  (CAPRI)   Thinking poor concentration   Orientation other (see comment)  (CAPRI)   Memory other (see comment)  (CAPRI)   Insight poor   Judgement impaired   Eye Contact at examiner  (Poor)   Affect blunted, flat   Mood depressed   Physical Appearance/Attire appears stated age   Hygiene other (see comment)  (Pt did not take shower during the shift)   Suicidality other (see comments)  (CAPRI)   1. Wish to be Dead (Recent)   (CAPRI)   2. Non-Specific Active Suicidal Thoughts (Recent)   (CAPRI)   Self Injury other (see comment)  (CAPRI)   Elopement   (No concerning behaviors observed or reported)   Activity isolative   Medication Sensitivity other (see comment)  (Appears sedated, slept through the shift)   Psychomotor / Gait   (CAPRI)   Coping/Psychosocial   Verbalized Emotional State other (see comments)  (Pt did not respond)   Safety   Assault status 15   Elopement status 15   Activities of Daily Living   Hygiene/Grooming   (Pt declined to come out)   Oral Hygiene   (CAPRI)   Dress   (CAPRI)   Room Organization   (CAPRI)

## 2019-11-22 PROCEDURE — 25000132 ZZH RX MED GY IP 250 OP 250 PS 637: Performed by: PSYCHIATRY & NEUROLOGY

## 2019-11-22 PROCEDURE — 12400001 ZZH R&B MH UMMC

## 2019-11-22 PROCEDURE — 99232 SBSQ HOSP IP/OBS MODERATE 35: CPT | Performed by: CLINICAL NURSE SPECIALIST

## 2019-11-22 PROCEDURE — G0177 OPPS/PHP; TRAIN & EDUC SERV: HCPCS

## 2019-11-22 PROCEDURE — 25000132 ZZH RX MED GY IP 250 OP 250 PS 637: Performed by: CLINICAL NURSE SPECIALIST

## 2019-11-22 RX ADMIN — MELATONIN 1000 UNITS: at 08:39

## 2019-11-22 RX ADMIN — VENLAFAXINE HYDROCHLORIDE 300 MG: 150 CAPSULE, EXTENDED RELEASE ORAL at 08:39

## 2019-11-22 RX ADMIN — MAGNESIUM HYDROXIDE 30 ML: 400 SUSPENSION ORAL at 16:49

## 2019-11-22 RX ADMIN — QUETIAPINE 400 MG: 400 TABLET, FILM COATED, EXTENDED RELEASE ORAL at 21:43

## 2019-11-22 RX ADMIN — HYDROXYZINE HYDROCHLORIDE 25 MG: 25 TABLET, FILM COATED ORAL at 19:51

## 2019-11-22 ASSESSMENT — ENCOUNTER SYMPTOMS
DYSPHORIC MOOD: 1
WOUND: 1
GASTROINTESTINAL NEGATIVE: 1
APPETITE CHANGE: 0
RESPIRATORY NEGATIVE: 1
CHILLS: 0
FEVER: 0

## 2019-11-22 ASSESSMENT — ACTIVITIES OF DAILY LIVING (ADL)
ORAL_HYGIENE: INDEPENDENT
ORAL_HYGIENE: INDEPENDENT
DRESS: INDEPENDENT
ORAL_HYGIENE: INDEPENDENT
HYGIENE/GROOMING: INDEPENDENT
DRESS: INDEPENDENT
HYGIENE/GROOMING: INDEPENDENT
LAUNDRY: WITH SUPERVISION
DRESS: INDEPENDENT
LAUNDRY: UNABLE TO COMPLETE
HYGIENE/GROOMING: INDEPENDENT

## 2019-11-22 NOTE — PROGRESS NOTES
"Pt was present in the milieu, went and participated in groups. Pt ate her meals to day and showered. At check in pt ranked her anxiety at a 9 (10 being the worst) and her depression at a 9. Pt stated yes to SI and SIB. Pt stated she has no plan, feels comfortable on the unit and coming to staff if these thoughts become too overwhelmed. Pt denied AH/VH, put is liking her medication \"I think they are helping\" and has no questions or concerns at this time.         11/22/19 1420   Behavioral Health   Hallucinations denies / not responding to hallucinations   Thinking poor concentration   Orientation person: oriented;place: oriented;date: oriented;time: oriented   Memory baseline memory   Insight poor   Judgement impaired   Eye Contact at examiner   Affect blunted, flat   Mood mood is calm;depressed   Physical Appearance/Attire attire appropriate to age and situation;appears stated age   Hygiene well groomed   Suicidality thoughts only   1. Wish to be Dead (Recent) Yes   2. Non-Specific Active Suicidal Thoughts (Recent) No   Self Injury thoughts only   Elopement   (noen observed )   Activity other (see comment)  (present in the milieu and gorups )   Speech coherent;clear   Medication Sensitivity no observed side effects;no stated side effects   Psychomotor / Gait steady;balanced   Activities of Daily Living   Hygiene/Grooming independent   Oral Hygiene independent   Dress independent   Laundry unable to complete   Room Organization independent     "

## 2019-11-22 NOTE — PLAN OF CARE
"Problem: OT General Care Plan  Goal: OT Goal 1    Pt attended 1 out of 3 OT groups offered. Pt participated in occupational therapy clinic. Pt was unable to ask for assistance and independently initiate self-selected task. Pt declines numerous offers from writer for project ideas to initiate. Pt spent most of the group observing other peers. Eventually, writer puts out simple goal-directed task materials out and pt accepts. Pt becomes more social throughout group, stating, \"I can't wait to see my sister and my dogs when I get home\". Calm and cooperative.    "

## 2019-11-22 NOTE — PROGRESS NOTES
Patient did not participate in group activities. She was able to come out for dinner. She reports that she feels sad, rates depression at ten, anxiety at nine,endorsed suicidal ideations and self-injurious thoughts, but has no plans. Patient denies hallucinations, and rates her mood at three out of ten. She believes that her medications are not helping her. Overall, patient appears calm, isolative, displays blunted affect, and accepts redirections.     11/21/19 5409   Behavioral Health   Hallucinations denies / not responding to hallucinations   Thinking distractable;poor concentration   Orientation person: oriented;place: oriented   Memory baseline memory   Insight poor   Judgement impaired   Eye Contact at examiner   Affect blunted, flat   Mood depressed;mood is calm   Physical Appearance/Attire appears stated age   Hygiene neglected grooming - unclean body, hair, teeth   Suicidality thoughts only   1. Wish to be Dead (Recent) Yes   2. Non-Specific Active Suicidal Thoughts (Recent) No   Self Injury thoughts only   Elopement   (No concerning behaviors observed)   Activity isolative   Speech clear;coherent   Medication Sensitivity no stated side effects;no observed side effects   Psychomotor / Gait balanced   Coping/Psychosocial   Verbalized Emotional State acceptance;anxiety;depression;sadness;suicidal thoughts   Safety   Suicidality Status 15   Assault status 15   Elopement status 15   Activities of Daily Living   Hygiene/Grooming independent   Oral Hygiene independent   Dress independent   Room Organization independent   Activity   Activity Assistance Provided independent

## 2019-11-22 NOTE — PROGRESS NOTES
Writer checked in on Pt when she was seen sitting in her bed, shaking. Writer asked Pt if she needed anything. Pt became tearful and began biting her hand. Pt did not appear to break skin. Writer prompted Pt her oral chewies and Pt continued to bite her hand. Writer lowered lights to decrease stimulation, and when Writer came back with a PRN Pt was quietly sleeping.     About an hour later, during a code Pt was found huddled in the corner of her room, hyperventilating with a pillow over her face. Pt was not redirectable to empathetic support. Writer affirmed Pt was safe. Pt was accepting of a PRN. 30 mins later Pt came out of her room and requested a shower. Pt has been calm, will continue to monitor.

## 2019-11-22 NOTE — PROGRESS NOTES
"[facility], [place]   Psychiatric Progress Note        Interim History:   The patient's care was discussed with the treatment team during the daily team meeting and/or staff's chart notes were reviewed.  Staff report patient is going to groups and is eating meals.     Psychiatric symptoms and interventions:  Patient is more active in the milieu. Patient reports she continues to have thoughts of \"killing myself\" and killing the peer at school. Patient reports, \"Once I get an idea I don't let it go.\" Patient has been taking her medications. She continues to bite her hand (not breaking the skin) as a coping strategy. Patient has been given more effective coping skills by staff.     Patient reports she calms down after talking with her sister. She thinks her parents are mad at her but can not identify the reason. Patient reports she is looking for another place to live. She wants to but her neighbors house.     Increased Seroquel to 400 mg to address mood instability.   Continued venlafaxine 300 mg to address depressive symptoms.       Medical: Hcg is negative, Admission labs unremarkable.      Behavioral/psychology/social:  Encouraged patient to attend therapeutic hospital programming as tolerated.   No room mate order due to her making threatening statements about her peer at [employer]. Patient is impulsive.             Medications:       influenza vaccine adult (product based on age)  0.5 mL Intramuscular Prior to discharge     QUEtiapine ER  400 mg Oral At Bedtime     venlafaxine  300 mg Oral Daily     Vitamin D3  1,000 Units Oral Daily          Allergies:     Allergies   Allergen Reactions     Penicillins Rash          Labs:   No results found for this or any previous visit (from the past 24 hour(s)).       Psychiatric Examination:     /85   Pulse 60   Temp 97.5  F (36.4  C) (Tympanic)   Resp 16   Ht 1.626 m (5' 4\")   Wt 90.5 kg (199 lb 9.6 oz)   SpO2 97%   BMI 34.26 " kg/m    Weight is 199 lbs 9.6 oz  Body mass index is 34.26 kg/m .  Orthostatic Vitals     None          Appearance: awake, alert and disheveled   Attitude:  guarded and uncooperative  Eye Contact:  fair  Mood:  depressed  Affect:  intensity is blunted  Speech:  normal prosody  Psychomotor Behavior:  no evidence of tardive dyskinesia, dystonia, or tics  Throught Process:  linear  Associations:  no loose associations  Thought Content:  passive suicidal ideation present, Patient talks about choking her peer at [employer].   Insight:  limited  Judgement:  limited  Oriented to:  time, person, and place  Attention Span and Concentration:  fair  Recent and Remote Memory:  fair     Clinical Global Impressions  First:  Considering your total clinical experience with this particular patient population, how severe are the patient's symptoms at this time?: 3 (11/20/19 1548)  Compared to the patient's condition at the START of treatment, this patient's condition is:: 3 (11/20/19 1548)  Most recent:  Considering your total clinical experience with this particular patient population, how severe are the patient's symptoms at this time?: 3 (11/20/19 1548)  Compared to the patient's condition at the START of treatment, this patient's condition is:: 3 (11/20/19 1548)         Precautions:     Behavioral Orders   Procedures     Assault precautions     Patient is reporting she wants to olive her peer at [employer]     Code 1 - Restrict to Unit     Routine Programming     As clinically indicated     Self Injury Precaution     Status 15     Every 15 minutes.     Suicide precautions     Patients on Suicide Precautions should have a Combination Diet ordered that includes a Diet selection(s) AND a Behavioral Tray selection for Safe Tray - with utensils, or Safe Tray - NO utensils            DIagnoses:   1.  Major depressive disorder, recurrent, severe without psychosis.   2.  Borderline personality traits.   3.  Intellectual  disability, IQ estimated at 82.   4.  Suicidal ideation.   5.  Homicidal ideation.          Plan:     Legal status: Discontinued 72 hour hold. Patient signed in voluntary      Medication management: Wellbutrin 150 mg, Seroquel 400 mg.      Disposition plan: Stabilize with medications, return to home. Patient has therapy  (next appt is on 12/1) and psychiatric through [facility].

## 2019-11-23 PROCEDURE — 12400001 ZZH R&B MH UMMC

## 2019-11-23 PROCEDURE — 90686 IIV4 VACC NO PRSV 0.5 ML IM: CPT | Performed by: CLINICAL NURSE SPECIALIST

## 2019-11-23 PROCEDURE — 25000128 H RX IP 250 OP 636: Performed by: CLINICAL NURSE SPECIALIST

## 2019-11-23 PROCEDURE — 25000132 ZZH RX MED GY IP 250 OP 250 PS 637: Performed by: PSYCHIATRY & NEUROLOGY

## 2019-11-23 PROCEDURE — H2032 ACTIVITY THERAPY, PER 15 MIN: HCPCS

## 2019-11-23 PROCEDURE — 25000132 ZZH RX MED GY IP 250 OP 250 PS 637: Performed by: CLINICAL NURSE SPECIALIST

## 2019-11-23 RX ADMIN — INFLUENZA A VIRUS A/BRISBANE/02/2018 IVR-190 (H1N1) ANTIGEN (FORMALDEHYDE INACTIVATED), INFLUENZA A VIRUS A/KANSAS/14/2017 X-327 (H3N2) ANTIGEN (FORMALDEHYDE INACTIVATED), INFLUENZA B VIRUS B/PHUKET/3073/2013 ANTIGEN (FORMALDEHYDE INACTIVATED), AND INFLUENZA B VIRUS B/MARYLAND/15/2016 BX-69A ANTIGEN (FORMALDEHYDE INACTIVATED) 0.5 ML: 15; 15; 15; 15 INJECTION, SUSPENSION INTRAMUSCULAR at 14:19

## 2019-11-23 RX ADMIN — VENLAFAXINE HYDROCHLORIDE 300 MG: 150 CAPSULE, EXTENDED RELEASE ORAL at 09:07

## 2019-11-23 RX ADMIN — SALINE NASAL SPRAY 1 SPRAY: 1.5 SOLUTION NASAL at 23:06

## 2019-11-23 RX ADMIN — ACETAMINOPHEN 650 MG: 325 TABLET, FILM COATED ORAL at 19:43

## 2019-11-23 RX ADMIN — QUETIAPINE 400 MG: 400 TABLET, FILM COATED, EXTENDED RELEASE ORAL at 19:43

## 2019-11-23 RX ADMIN — HYDROXYZINE HYDROCHLORIDE 25 MG: 25 TABLET, FILM COATED ORAL at 19:44

## 2019-11-23 RX ADMIN — MELATONIN 1000 UNITS: at 09:08

## 2019-11-23 ASSESSMENT — ACTIVITIES OF DAILY LIVING (ADL)
ORAL_HYGIENE: INDEPENDENT
HYGIENE/GROOMING: INDEPENDENT
DRESS: INDEPENDENT

## 2019-11-23 NOTE — PLAN OF CARE
48 Hour Nursing Assessment    Patient evaluation continues. Assessed mood, anxiety, thoughts and behavior. Patient denies auditory or visual hallucinations. Is progressing toward goals. Encourage participation in groups and developing healthy coping skills. Will continue to assess.     Pt had an overall positive shift. Pt has been attending groups and has been cooperative on the unit. Pt endorsing SI and SIB thoughts and urges, but was able to contract for safety with staff. Pt also took PRN hydroxyzine. Pt became tearful after evening group, but denied anything happening in group to trigger this. Pt unable to verbalize feelings at this time, but was able to take her HS seroquel and accepted a weighted blanket. Pt nodded her head yes when asked if nights were difficult for her. Staff sat with pt for a little while and pt appeared to be sleeping shortly after this.  Pt denies any other concerns at this time.

## 2019-11-23 NOTE — PLAN OF CARE
Patient participated in evening mental health management group using structured origami task and discussion on self-care to promote engagement in therapeutic opportunity and wellness. Patient was eager, alert, and attentive throughout group. She needed cues for interruption as she dominated conversation. She was on topic though concrete. She completed origami task with assistance for sequencing, problem solving, and motor execution as she struggled to fold even edges despite group dues to do so. She was pleased with the outcome, having a functional paper box. She participated in discussion about self-care, identifying a lot of independent living tasks, hygiene, paying bills, grooming, cooking. She offered feedback to a peer who began crying not to let others put him down. She admitted she puts herself down despite advice from others' not to. She was thanked and acknowledged for giving nice feedback.

## 2019-11-23 NOTE — PROGRESS NOTES
"   11/23/19 1400   Behavioral Health   Hallucinations denies / not responding to hallucinations   Thinking poor concentration   Orientation place: oriented;date: oriented;time: oriented   Memory baseline memory   Insight admits / accepts   Judgement impaired   Eye Contact at examiner   Affect/Mood (WDL) ex   Affect full range affect   Mood depressed;anxious;mood is calm   ADL Assessment (WDL) WDL   Physical Appearance/Attire attire appropriate to age and situation   Hygiene well groomed   Suicidality (WDL) ex   Suicidality chronic thoughts with no stated plan   1. Wish to be Dead (Recent) Yes   Change in Protective Factors? No   Enviromental Risk Factors None   Self Injury urges   Elopement (WDL) WDL   Activity (WDL) WDL   Speech (WDL) WDL   Speech clear;coherent   Medication Sensitivity (WDL) WDL   Medication Sensitivity no stated side effects;no observed side effects   Psychomotor Gait (WDL) WDL   Psychomotor / Gait balanced;steady   Overt Agression (WDL) WDL     Pt was calm and cooperative this morning. Pt ate her breakfast and read in her bed for a majority of the morning. Pt attended groups and made her needs known. Pt received her flu shot in the deltoid. Pt reports no pain after. Pt did call mom after lunch and talked with her. Pt appeared to enjoy her conversation with her mother as she was seen laughing and smiling. During check in writer asked patient if she was asked if she was feeling suicidal and she stated \"yes.\" Pt was asked if she had a plan and she stated \"no.\" Pt was asked if she could be safe while on the unit and she stated \"yes.\" Pt was asked if she was having SIB thoughts and she stated \"yes.\" When asked if she had a plan she stated \"no.\"   "

## 2019-11-23 NOTE — PROGRESS NOTES
"   11/22/19 1900   Behavioral Health   Hallucinations denies / not responding to hallucinations   Thinking poor concentration   Orientation person: oriented;place: oriented;date: oriented;time: oriented   Memory baseline memory   Insight poor;admits / accepts   Judgement impaired   Eye Contact at examiner   Affect full range affect   Mood depressed;anxious   Physical Appearance/Attire attire appropriate to age and situation   Suicidality thoughts and plan;other (see comments)  (\"overdose on meds\")   1. Wish to be Dead (Recent) Yes   2. Non-Specific Active Suicidal Thoughts (Recent) Yes   Self Injury other (see comment);urges  (\"head banging, biting self\")   Elopement   (no concerns)   Activity other (see comment)  (pt is visible and active in milieu)   Speech coherent;clear   Medication Sensitivity no stated side effects;no observed side effects   Psychomotor / Gait steady;balanced   Activities of Daily Living   Hygiene/Grooming independent   Oral Hygiene independent   Dress independent   Room Organization independent     Pt reported that it might help her feel safe if staff checked on her more often than every fifteen minutes. Pt reported SI and SIB with the following thoughts of overdosing, banging head against wall, \"stabbing\" her arm, and biting her arms. Pt reported cravings of marijuana, cigarettes and alcohol. Pt reported that music is helpful for calming her down. Pt reported anxiety at 9/10 and depression at 10/10 with 10 being the worst. Pt denied HI and hallucinations. Pt is visible and social in the milieu. Pt attended and participated in community meeting. Pt reported that she has made a list of ways to hurt herself and that writing is a helpful coping skill for her. Pt reported that she is afraid of going to \"the quiet room\" like she has in previous admissions so she stopped herself from head banging earlier.  "

## 2019-11-24 PROCEDURE — 25000132 ZZH RX MED GY IP 250 OP 250 PS 637: Performed by: PSYCHIATRY & NEUROLOGY

## 2019-11-24 PROCEDURE — 25000132 ZZH RX MED GY IP 250 OP 250 PS 637: Performed by: CLINICAL NURSE SPECIALIST

## 2019-11-24 PROCEDURE — 12400001 ZZH R&B MH UMMC

## 2019-11-24 RX ORDER — POLYETHYLENE GLYCOL 3350 17 G/17G
1 POWDER, FOR SOLUTION ORAL DAILY PRN
Status: ON HOLD | COMMUNITY
End: 2019-12-23

## 2019-11-24 RX ORDER — HYDROXYZINE HYDROCHLORIDE 25 MG/1
25-50 TABLET, FILM COATED ORAL 3 TIMES DAILY PRN
Status: ON HOLD | COMMUNITY
End: 2019-11-29

## 2019-11-24 RX ADMIN — SALINE NASAL SPRAY 1 SPRAY: 1.5 SOLUTION NASAL at 20:31

## 2019-11-24 RX ADMIN — VENLAFAXINE HYDROCHLORIDE 300 MG: 150 CAPSULE, EXTENDED RELEASE ORAL at 08:26

## 2019-11-24 RX ADMIN — MELATONIN 1000 UNITS: at 08:26

## 2019-11-24 RX ADMIN — HYDROXYZINE HYDROCHLORIDE 25 MG: 25 TABLET, FILM COATED ORAL at 19:23

## 2019-11-24 RX ADMIN — TRAZODONE HYDROCHLORIDE 50 MG: 50 TABLET ORAL at 22:33

## 2019-11-24 RX ADMIN — QUETIAPINE 400 MG: 400 TABLET, FILM COATED, EXTENDED RELEASE ORAL at 19:23

## 2019-11-24 ASSESSMENT — ACTIVITIES OF DAILY LIVING (ADL)
LAUNDRY: WITH SUPERVISION
ORAL_HYGIENE: INDEPENDENT
DRESS: INDEPENDENT
HYGIENE/GROOMING: INDEPENDENT

## 2019-11-24 ASSESSMENT — MIFFLIN-ST. JEOR: SCORE: 1653

## 2019-11-24 NOTE — PROGRESS NOTES
"   11/24/19 1400   Behavioral Health   Hallucinations denies / not responding to hallucinations   Thinking poor concentration   Orientation person: oriented;place: oriented;date: oriented   Memory baseline memory   Insight admits / accepts   Judgement impaired   Eye Contact at examiner   Affect sad;full range affect   Mood depressed;mood is calm   ADL Assessment (WDL) WDL   Physical Appearance/Attire attire appropriate to age and situation   Hygiene well groomed   Suicidality (WDL)   (denies)   Suicidality chronic thoughts with no stated plan   1. Wish to be Dead (Recent) Yes   Change in Protective Factors? No   Enviromental Risk Factors None   Self Injury urges   Activity (WDL) WDL   Activity isolative;withdrawn   Speech (WDL) WDL   Speech clear;coherent   Medication Sensitivity (WDL) WDL   Medication Sensitivity no stated side effects;no observed side effects   Psychomotor Gait (WDL) WDL   Psychomotor / Gait balanced;steady   Overt Agression (WDL) WDL     Pt was calm and cooperative this morning. Pt came out to [facility] and ate breakfast. After breakfast pt socialized with peers. At aroud 10am pt was seen crying in the lounge by staff. Staff approached pt and asked if she wanted to talk but pt refused. Writer went to pt and asked if she would like any PRNs for the anxiety she was having but pt refused. Writer asked pt if she would like to walk to her room and talk about it but she stated \"no.\" Writer asked if she was feeling suicidal and she stated \"yes.\" Pt was asked if she had a plan and she stated \"no.\" Pt denies SIB, AH, and VH. Pt did attend groups today and she was medication compliant. Will continue to monitor.   "

## 2019-11-24 NOTE — PHARMACY-ADMISSION MEDICATION HISTORY
Admission Medication History status for the 2019 admission is complete.  See EPIC admission navigator for Prior to Admission medications.  Patient's Name: Sadaf Ross  Patient's : 2000   19 year old, female    Medication History Sources: Patient, care everywhere and dispense report  Primary Pharmacy: [facility] [id number] [place] [address]    Medication history source reliability: Moderate  Medication adherence:  Moderate    Changes made to PTA medication list (reason)  Added: Miralax packet  Deleted: Fluoxetine 40 mg at bedtime (per note on 19)  Changed: Hydroxyzine 10 mg q6h prn to 25-50 mg tid prn    High Risk Medications (Warfarin, Immunosuppressants, Insulin, Antibiotics, or Other)    None    Additional medication history information (including actions taken by pharmacist):    None    Time spent in this activity: 20 minutes    Medication history completed by:  Tori Méndez PD2 Pharmacy Intern    Prior to Admission Medications   Prescriptions Last Dose Informant Patient Reported? Taking?   QUEtiapine (SEROQUEL XR) 300 MG 24 hr tablet 2019 at Unknown time Self No Yes   Sig: Take 1 tablet (300 mg) by mouth At Bedtime   Vitamin D, Cholecalciferol, 25 MCG (1000 UT) TABS 2019 at Unknown time Self Yes Yes   Sig: Take 1,000 Units by mouth daily    hydrOXYzine (ATARAX) 25 MG tablet Unknown at Unknown time Self Yes No   Sig: Take 25-50 mg by mouth 3 times daily as needed for itching   polyethylene glycol (MIRALAX/GLYCOLAX) packet Unknown at Unknown time Self Yes No   Sig: Take 1 packet by mouth daily as needed for constipation   venlafaxine (EFFEXOR-XR) 150 MG 24 hr capsule 2019 at Unknown time Self Yes Yes   Sig: Take 300 mg by mouth daily      Facility-Administered Medications: None

## 2019-11-24 NOTE — PROGRESS NOTES
"   19   Behavioral Health   Hallucinations denies / not responding to hallucinations   Thinking poor concentration   Orientation person: oriented;place: oriented;date: oriented;time: oriented   Memory baseline memory   Insight admits / accepts   Judgement impaired   Eye Contact at examiner   Affect full range affect   Mood mood is calm   Physical Appearance/Attire attire appropriate to age and situation   Hygiene well groomed   Suicidality chronic thoughts with no stated plan   1. Wish to be Dead (Recent) Yes   2. Non-Specific Active Suicidal Thoughts (Recent) Yes   Self Injury urges   Elopement   (no concerns)   Activity withdrawn;isolative   Speech clear;coherent   Medication Sensitivity no stated side effects;no observed side effects   Psychomotor / Gait balanced;steady   Activities of Daily Living   Hygiene/Grooming independent   Oral Hygiene independent   Dress independent   Room Organization independent     Pt reported that she does not believe she will feel better by the holiday. Pt stated \"I feel worse than usual.\" Pt reported depression, anxiety and physical pain. Pt stated \"If I get out of here I will hurt myself.\" Pt reported she has been thinking about her  sister and her father this evening. Pt was social with other patients and was observed playing cards. Pt was asleep the beginning of the shift but was more visible as the evening progressed.  "
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
## 2019-11-25 LAB — HBA1C MFR BLD: 5.3 % (ref 0–5.6)

## 2019-11-25 PROCEDURE — 25000132 ZZH RX MED GY IP 250 OP 250 PS 637: Performed by: PSYCHIATRY & NEUROLOGY

## 2019-11-25 PROCEDURE — 99232 SBSQ HOSP IP/OBS MODERATE 35: CPT | Performed by: PSYCHIATRY & NEUROLOGY

## 2019-11-25 PROCEDURE — 83036 HEMOGLOBIN GLYCOSYLATED A1C: CPT | Performed by: PSYCHIATRY & NEUROLOGY

## 2019-11-25 PROCEDURE — 12400001 ZZH R&B MH UMMC

## 2019-11-25 PROCEDURE — G0177 OPPS/PHP; TRAIN & EDUC SERV: HCPCS

## 2019-11-25 PROCEDURE — 36415 COLL VENOUS BLD VENIPUNCTURE: CPT | Performed by: PSYCHIATRY & NEUROLOGY

## 2019-11-25 PROCEDURE — 25000132 ZZH RX MED GY IP 250 OP 250 PS 637: Performed by: CLINICAL NURSE SPECIALIST

## 2019-11-25 PROCEDURE — 99207 ZZC CDG-MDM COMPONENT: MEETS MODERATE - UP CODED: CPT | Performed by: PSYCHIATRY & NEUROLOGY

## 2019-11-25 RX ORDER — NALTREXONE HYDROCHLORIDE 50 MG/1
50 TABLET, FILM COATED ORAL DAILY
Status: DISCONTINUED | OUTPATIENT
Start: 2019-11-25 | End: 2019-11-29 | Stop reason: HOSPADM

## 2019-11-25 RX ADMIN — NALTREXONE HYDROCHLORIDE 50 MG: 50 TABLET, FILM COATED ORAL at 15:26

## 2019-11-25 RX ADMIN — NICOTINE 1 PATCH: 7 PATCH TRANSDERMAL at 19:40

## 2019-11-25 RX ADMIN — MELATONIN 1000 UNITS: at 08:05

## 2019-11-25 RX ADMIN — NICOTINE 1 PATCH: 7 PATCH TRANSDERMAL at 15:26

## 2019-11-25 RX ADMIN — VENLAFAXINE HYDROCHLORIDE 300 MG: 150 CAPSULE, EXTENDED RELEASE ORAL at 08:04

## 2019-11-25 RX ADMIN — QUETIAPINE 400 MG: 400 TABLET, FILM COATED, EXTENDED RELEASE ORAL at 19:39

## 2019-11-25 ASSESSMENT — ACTIVITIES OF DAILY LIVING (ADL)
HYGIENE/GROOMING: INDEPENDENT
ORAL_HYGIENE: INDEPENDENT
LAUNDRY: UNABLE TO COMPLETE
HYGIENE/GROOMING: INDEPENDENT
DRESS: INDEPENDENT
ORAL_HYGIENE: INDEPENDENT
LAUNDRY: UNABLE TO COMPLETE
DRESS: SCRUBS (BEHAVIORAL HEALTH)

## 2019-11-25 NOTE — PROGRESS NOTES
Patient already given her clothing by staff at 1500 when writer gives patient Naltexone medication PO and nicotine patch- pt agrees to inform staff if she has any concerns.

## 2019-11-25 NOTE — PLAN OF CARE
"48 Hour Nursing Assessment    Patient evaluation continues. Assessed mood, anxiety, thoughts and behavior. Patient denies auditory or visual hallucinations. Is progressing toward goals. Encourage participation in groups and developing healthy coping skills. Will continue to assess.     Pt had a labile shift. Pt was quiet and withdrawn at the beginning of the shift, but brightened and became more social a little bit later. Pt reported that she was hopeful to discharge soon and enjoyed talking to staff about her animals at home. Before visiting hours, pt had a phone call with her sister that she was happy about, but became very tearful after. Pt laid on her bed crying for over an hour and biting her hand. Pt stated \"I don't know what I feel this way. I just want to be dead. I want to be with my baby sister.\" Staff sat with her on and off and she was accepting of her HS medications with PRN hydroxyzine. Pt did pace with writer in the halls for a short period of time before returning to her bed. Pt eventually calmed and was visible in evening group, but did not participate. Around 2200, pt was observed sitting in front of the desk very lightly tapping her head on the wall, but eventually stopped on her own. Pt given trazodone for sleep tonight.   "

## 2019-11-25 NOTE — PROGRESS NOTES
CTC spoke with pt's CCM. CTC gave update on treatment and discharge plan. CCM asked for updated med list to be faxed to her ([phone]).

## 2019-11-25 NOTE — PROGRESS NOTES
Pt was observed passing a note to pt 416. Note is in pt's binder. Note was inappropriate and asked pt 416 if they wanted to date. Pt is stating she is on a hunger strike, due to pt 416 and 420-2 saying they are on one. Pt bit her wrist and will not allow staff to see. Pt is storming around the unit and swearing, unable to determine why, likely due to pt 420-2 being upset with pt 422-1 and 422-2. Pt is repeating the complaints of 416 and 420-2.

## 2019-11-25 NOTE — PROVIDER NOTIFICATION
"Pt declines lunch stating, \"I was told from someone at [facility] that I have an eating disorder, so I am not eating lunch\" Staff report other patient's not eating due to hunger strike and believe patient might be copying this behavior.    Pt assisted with clean scrub attire and hygiene supplies-pt completes taking shower and asking for her own clothing.    Writer takes a few minutes to check in with patient who still states her plan is to kill this roommate and doesn't care about the consequences for both the other person or herself. Patient asked what she would do if her friend was the one who had the same thoughts, should her friend be allowed her clothes at this time-writer attempts to validate and challenge the seriousness of her thoughts.     Patient states \"I can get really angry and go from 0 to 100 and no, if I was a friend, I would not allow her to have her clothing\"     Writer then informs patient she answered her own question and we will not give her her own clothing or hair binder at this time.  "

## 2019-11-25 NOTE — PROGRESS NOTES
11/24/19 2200   Therapeutic Recreation   Type of Intervention structured groups   Activity game   Response Refuses     Pt briefly attended the Therapeutic Recreation group with focus on leisure participation, social engagement, and stress reduction.  Pt chose not to participate in the group recreational intervention via a group game.  Pt left group after a few minutes.

## 2019-11-25 NOTE — PROGRESS NOTES
"[facility] [place]   Psychiatric Progress Note        Interim History:   The patient's care was discussed with the treatment team during the daily team meeting and/or staff's chart notes were reviewed.      Psychiatric symptoms and interventions:  Patient denied having active intent or plan to harm her schoolmate, however did indicate having passive ideation. She tells me about some on and off overwhelming urge to bite herself or cut herself. Discussed starting Naltrexone to help attenuate her recurrent compulsive urges to bite herself and/or cut herself, which she was in agreement with. Denied current SI/AH/VH. Wanted to have her blood sugar tested for any diabetes due to her complaints of shaking.       Medical: Hcg is negative, Admission labs unremarkable.      Behavioral/psychology/social:  Encouraged patient to attend therapeutic hospital programming as tolerated.   No room mate order due to her making threatening statements about her peer at [employer]. Patient is impulsive.             Medications:       naltrexone  50 mg Oral Daily     nicotine  1 patch Transdermal Daily     nicotine   Transdermal Q8H     [START ON 11/26/2019] nicotine   Transdermal Daily     QUEtiapine ER  400 mg Oral At Bedtime     venlafaxine  300 mg Oral Daily     Vitamin D3  1,000 Units Oral Daily          Allergies:     Allergies   Allergen Reactions     Penicillins Rash          Labs:     Recent Results (from the past 24 hour(s))   Hemoglobin A1c    Collection Time: 11/25/19  1:19 PM   Result Value Ref Range    Hemoglobin A1C 5.3 0 - 5.6 %          Psychiatric Examination:     /84   Pulse 102   Temp 97.3  F (36.3  C) (Oral)   Resp 16   Ht 1.626 m (5' 4\")   Wt 89.8 kg (197 lb 15.6 oz)   SpO2 96%   BMI 33.98 kg/m    Weight is 197 lbs 15.57 oz  Body mass index is 33.98 kg/m .  Orthostatic Vitals     None          Appearance: awake, alert and disheveled   Attitude:  guarded and " uncooperative  Eye Contact:  fair  Mood:  depressed  Affect:  intensity is blunted  Speech:  normal prosody  Psychomotor Behavior:  no evidence of tardive dyskinesia, dystonia, or tics  Throught Process:  linear  Associations:  no loose associations  Thought Content: No current SI/HI/AH/VH.   Insight:  limited  Judgement:  limited  Oriented to:  time, person, and place  Attention Span and Concentration:  fair  Recent and Remote Memory:  fair     Clinical Global Impressions  First:  Considering your total clinical experience with this particular patient population, how severe are the patient's symptoms at this time?: 3 (11/20/19 1548)  Compared to the patient's condition at the START of treatment, this patient's condition is:: 3 (11/20/19 1548)  Most recent:  Considering your total clinical experience with this particular patient population, how severe are the patient's symptoms at this time?: 3 (11/20/19 1548)  Compared to the patient's condition at the START of treatment, this patient's condition is:: 3 (11/20/19 1548)         Precautions:     Behavioral Orders   Procedures     Assault precautions     Patient is reporting she wants to olive her peer at [employer]     Code 1 - Restrict to Unit     Routine Programming     As clinically indicated     Self Injury Precaution     Status 15     Every 15 minutes.     Suicide precautions     Patients on Suicide Precautions should have a Combination Diet ordered that includes a Diet selection(s) AND a Behavioral Tray selection for Safe Tray - with utensils, or Safe Tray - NO utensils            DIagnoses:   1.  Major depressive disorder, recurrent, severe without psychosis.   2.  Borderline personality traits.   3.  Intellectual disability, IQ estimated at 82.   4.  Suicidal ideation.   5.  Homicidal ideation.          Plan:     Legal status: Discontinued 72 hour hold. Patient signed in voluntary     Assessment: Patient functions in a regressed cognitive capacity.  Discussed starting Naltrexone to help attenuate her recurrent compulsive urges to bite herself and/or cut herself.      Medication management:   - Start Naltrexone 50 mg  - Continue Effexor  mg, Seroquel 400 mg.   - Nicotine Patch 7 mg      Disposition plan: Stabilize with medications, return to home. Patient has therapy  (next appt is on 12/1) and psychiatric through [facility].                Jl Connor MD  [facility] Psychiatry

## 2019-11-25 NOTE — PLAN OF CARE
"Problem: OT General Care Plan  Goal: OT Goal 1    Pt attended 3 out of 3 OT groups offered. Pt actively participated in occupational therapy clinic. Pt was able to ask for assistance as needed, and independently initiate goal-directed task demonstrating improvement with ability to self-select task. She verbalizes \"these crosswords and color by the number help me cope with my sadness they're calming\". Pt states she has a lot of questions for her doctor. Writer encouraged pt to write the questions down so she doesn't forget and can stay organized. Pt appeared comfortable interacting with peers. Pt actively participated in a structured occupational therapy group with a focus on visuospatial problem solving and social engagement via a group game. Pt demonstrated understanding of the novel 3-step task after an initial explanation, though requires occasional assist. Pt interjects conversation frequently throughout full duration of group requiring some redirection. Overall, calm and cooperative.        "

## 2019-11-26 PROCEDURE — H2032 ACTIVITY THERAPY, PER 15 MIN: HCPCS

## 2019-11-26 PROCEDURE — G0177 OPPS/PHP; TRAIN & EDUC SERV: HCPCS

## 2019-11-26 PROCEDURE — 99231 SBSQ HOSP IP/OBS SF/LOW 25: CPT | Performed by: PSYCHIATRY & NEUROLOGY

## 2019-11-26 PROCEDURE — 25000132 ZZH RX MED GY IP 250 OP 250 PS 637: Performed by: CLINICAL NURSE SPECIALIST

## 2019-11-26 PROCEDURE — 25000132 ZZH RX MED GY IP 250 OP 250 PS 637: Performed by: PSYCHIATRY & NEUROLOGY

## 2019-11-26 PROCEDURE — 12400001 ZZH R&B MH UMMC

## 2019-11-26 RX ADMIN — NICOTINE 1 PATCH: 7 PATCH TRANSDERMAL at 08:30

## 2019-11-26 RX ADMIN — OLANZAPINE 5 MG: 5 TABLET, FILM COATED ORAL at 12:45

## 2019-11-26 RX ADMIN — HYDROXYZINE HYDROCHLORIDE 25 MG: 25 TABLET, FILM COATED ORAL at 12:45

## 2019-11-26 RX ADMIN — MELATONIN 1000 UNITS: at 08:26

## 2019-11-26 RX ADMIN — VENLAFAXINE HYDROCHLORIDE 300 MG: 150 CAPSULE, EXTENDED RELEASE ORAL at 08:26

## 2019-11-26 RX ADMIN — NALTREXONE HYDROCHLORIDE 50 MG: 50 TABLET, FILM COATED ORAL at 08:26

## 2019-11-26 RX ADMIN — QUETIAPINE 400 MG: 400 TABLET, FILM COATED, EXTENDED RELEASE ORAL at 21:26

## 2019-11-26 ASSESSMENT — ACTIVITIES OF DAILY LIVING (ADL)
ORAL_HYGIENE: INDEPENDENT
HYGIENE/GROOMING: INDEPENDENT
DRESS: INDEPENDENT;STREET CLOTHES
HYGIENE/GROOMING: INDEPENDENT;SHOWER
ORAL_HYGIENE: INDEPENDENT
DRESS: INDEPENDENT

## 2019-11-26 ASSESSMENT — MIFFLIN-ST. JEOR: SCORE: 1661

## 2019-11-26 NOTE — PROGRESS NOTES
Pt participated more peripherally, naming that she would prefer not to be seen. Therefore, she was tangential to group process involving pace, and personal expression. She did, however, remain throughout the entire session.         11/26/19 8387   Dance Movement Therapy   Type of Intervention structured groups   Response participates with encouragement   Hours 1

## 2019-11-26 NOTE — PROGRESS NOTES
"SPIRITUAL HEALTH SERVICES  SPIRITUAL ASSESSMENT Progress Note  [facility] ([facility] [place]) 4AW     REFERRAL SOURCE: Epic consult for emotional support.     Sadaf \"does not want to see (her) dad die\".  She says if he dies \"I want to go with him\" and she does not want to go home.  She says she will \"stab her throat\" so she can stay here to avoid going home.     Sadaf asks God to help her and her family.  We discussed acceptance and coping.  Sadaf continued to focus on \"losing\" her dad and saying \"I can't do this\".    We prayed about her dad's health, her family and Sadaf's acceptance.     PLAN: Sadaf knows she can get further  support through her nurse.     Morgan Wilcox  Chaplain Resident  Pager [phone]    "

## 2019-11-26 NOTE — PLAN OF CARE
Pt continues to be assessed regarding thoughts, behavior, medications. Pt has been out in the milieu most of the shift. Pt later in the shift pt reported feeling pressured by the pt in 416 to engage in SIB. She stated that pt encouraged her to cut open her pillow covering and hide a broken toothbrush inside. She then showed staff the cut open pillow casing. Some superficial cut marks noted on pt's wrist. Pt out in the milieu the rest of the shift until bedtime. Pt denied SIB thoughts before bed and stated she would come to staff if she had problems controlling thoughts. Pt contracted for safety on the unit. Denies SI thoughts.

## 2019-11-26 NOTE — PROGRESS NOTES
INITIAL OT ASSESSMENT     11/20/19 1500   General Information   Date Initially Attended OT 11/21/19   Has Not Attended OT as of: 11/20/19   Clinical Impression   Affect Fluctuates   Orientation Oriented to person, place and time   Appearance and ADLs General cleanliness observed in most areas   Attention to Internal Stimuli No observed signs   Interaction Skills Intrusive  (Initially, pt was very withdrawn, now pt needs redirection )   Ability to Communicate Needs Does so with prompts   Verbal Content [place];Tangential;Perservates on topic   Ability to Maintain Boundaries Maintains appropriate physical boundaries;Needs occasional cues   Participation Participates with minimal encouragement   Concentration Concentrates 10-20 minutes   Ability to Concentrate With structure;Easily distracted   Follows and Comprehends Directions Needs direction simplified   Memory Delayed and immediate recall intact   Organization Independently organizes medium tasks   Decision Making Follows the leads of peers   Planning and Problem Solving Indentifies problems but not alternatives;Needs assistance   Ability to Apply and Learn Concepts Needs further assessment   Frustrations / Stress Tolerance Independently identifies sources of frustration/stress;Utilizes coping skills with assistance   Level of Insight Some insight   Self Esteem Poor self esteem   Social Supports Unable to identify any supports

## 2019-11-26 NOTE — PLAN OF CARE
Problem: OT General Care Plan  Goal: OT Goal 1  Description  Pt will practice using >2 coping strategies to manage stress and reduce symptoms to demonstrate increased readiness for discharge.      Pt attended 1 out of 2 OT groups offered. Pt actively participated in occupational therapy clinic. Pt was able to ask for assistance as needed, and independently initiate familiar goal-directed task. Pt appeared comfortable interacting with peers, though needs occasional redirection to allow for other peers to contribute to the therapeutic conversation. Pt actively participated in a discussion facilitated by a group game. Pt had some difficulty refecting on identifying and expressing emotions, communication, confidence, and self-esteem and left after 15 minutes to take a shower (no charge). Overall, calm and cooperative during groups.

## 2019-11-26 NOTE — PROGRESS NOTES
CLINICAL NUTRITION SERVICES - BRIEF NOTE    Reason for Evaluation: Patient/Family Request - Pt would like to know more about what she is eating and may want a meal plan. She does not feel like she is getting enough protein as she does not eat much meat.    RD spoke with pt who reports having a fluctuating appetite d/t stress and mental health issues. Pt is specifically looking for advice on good protein sources and how often she should be eating them. Sadaf has gotten Boost/Ensure during previous hospitalization and would like it with BID meals as a back up if she doesn't eat well.       Interventions  - Order Vanilla Boost Plus with breakfast and Strawberry with dinner.    - Nutrition education: provided education on the importance of adequate protein/energy intake for mental health and roll of RD.   - Reviewed and provided the following handout: High-Protein Foods List.   - Encouraged pt to eat >50% of TID meals and even if she isn't hungry at meal time, to at least eat 1 good protein source from the list and some fruits/veggies     Follow Up/ Monitoring  No nutrition follow-up warranted at this time. RD to sign off. Please consult if further needs arise.       Day Rasheed RD, LD  Pager: [phone]

## 2019-11-26 NOTE — PROVIDER NOTIFICATION
"Pt crying in room, reports \"I cannot go home and see my Dad he is my everything and he is dying\"    Writer listens to patient who begins sobbing in response to her doctor informing her of transition and plan to discharge home with Dad. Pt reports \"My Dad has had several stomach surgeries and says he will not live long, I can't go home and have to deal with him dying, I can't go home yet.\"     Pt seems to believe that her father is dying, she states she is being told that she will have to prepare for this day sometime very soon, \"my  Dad has cheated death already several times,\" she says and \"if he dies I will just kill myself too\"   Pt who is lower cognitive functioning is pretty concrete and writer suspects she is having difficulty coping with generalized statements about her father's chronic illness and may even believe and worry that he will die any time now; pt given prn Zyprexa and weighted blanket while writer calms patient some. Pt given prn hydroxyzine per request and agrees to eat some lunch and then shower as she requested this morning.  Writer places consult for spiritual department as pt identifies self as Moravian and believes in GOD and think it might be beneficial with patient having anticipatory grief and/or support patient and understand her emotional response to returning home with Dad.   "

## 2019-11-26 NOTE — PLAN OF CARE
"Pt goal to shower after group this morning; awake and visible in milieu this morning, has own clothing/sweat shirt, affect full range, mood is calm, rambling speech with staff describing favorite TV shows, denies racing, disturbing or suicidal thoughts and no hallucinations, no self injurious reports or behaviors, 100% breakfast eaten-pt given information on naltrexone-new med-denies any side effects, no physical pain or significant anxiety; /76   Pulse 97   Temp 97.4  F (36.3  C) (Tympanic)   Resp 16   Ht 1.626 m (5' 4\")   Wt 90.6 kg (199 lb 11.8 oz)   SpO2 96%   BMI 34.28 kg/m    NO aggression requiring restraint or seclusion; will continue to encourage engagement activities & programming and reminded of importance of good boundaries and focus on own treatment goals; redirect as needed on boundaries and unit guidelines, continue to assess mood, thoughts and SI/SIB precautions for safety.   "

## 2019-11-26 NOTE — PLAN OF CARE
"OT Self-Assessment  Pt was given and completed a written self-assessment form. OT staff reviewed with pt and explained the value of having them involved in their treatment plan, and provided options to meet current needs/self-identified goals.     Pt identified \"when i'm in the corner\" as stressors/events that led to hospitalization.    Pt identified the following symptoms that they are currently dealing with:   Emotions: sadness, anxiety or fear, feeling numb, feeling helpless  Thoughts: negative thoughts, trouble concentrating, trouble making decisions, hearing voices, nightmares, slowed thinking, blanking out  Behaviors: suicidal gesture, self-harming actions, problems with relationships, restless behaviors, budget/money concerns, victim of abuse/crime, eating disorder.    Self-identified coping skills: talking to someone  Self-identified social supports: none stated  Self-identified personal strengths: none stated    Goals: manage anxiety, manage anger, manage stress, improve focus/concentration    "

## 2019-11-27 PROCEDURE — 25000132 ZZH RX MED GY IP 250 OP 250 PS 637: Performed by: CLINICAL NURSE SPECIALIST

## 2019-11-27 PROCEDURE — 12400001 ZZH R&B MH UMMC

## 2019-11-27 PROCEDURE — G0177 OPPS/PHP; TRAIN & EDUC SERV: HCPCS

## 2019-11-27 PROCEDURE — H2032 ACTIVITY THERAPY, PER 15 MIN: HCPCS

## 2019-11-27 PROCEDURE — 25000132 ZZH RX MED GY IP 250 OP 250 PS 637: Performed by: PSYCHIATRY & NEUROLOGY

## 2019-11-27 RX ADMIN — HYDROXYZINE HYDROCHLORIDE 25 MG: 25 TABLET, FILM COATED ORAL at 20:17

## 2019-11-27 RX ADMIN — NICOTINE 1 PATCH: 7 PATCH TRANSDERMAL at 09:51

## 2019-11-27 RX ADMIN — NALTREXONE HYDROCHLORIDE 50 MG: 50 TABLET, FILM COATED ORAL at 09:51

## 2019-11-27 RX ADMIN — MELATONIN 1000 UNITS: at 09:52

## 2019-11-27 RX ADMIN — VENLAFAXINE HYDROCHLORIDE 300 MG: 150 CAPSULE, EXTENDED RELEASE ORAL at 09:51

## 2019-11-27 RX ADMIN — QUETIAPINE 400 MG: 400 TABLET, FILM COATED, EXTENDED RELEASE ORAL at 20:17

## 2019-11-27 ASSESSMENT — ACTIVITIES OF DAILY LIVING (ADL)
ORAL_HYGIENE: INDEPENDENT
DRESS: STREET CLOTHES;INDEPENDENT
LAUNDRY: WITH SUPERVISION
DRESS: STREET CLOTHES
ORAL_HYGIENE: INDEPENDENT
HYGIENE/GROOMING: INDEPENDENT
HYGIENE/GROOMING: INDEPENDENT

## 2019-11-27 NOTE — PLAN OF CARE
"Pt appears depressed with flat affect this morning but out in lounge eating breakfast with peers. Pt does not socialize much and more often keeps to self, coloring or watching tv. Ate 50% of breakfast and was complaining of stomach ache. Pt reports \"my stomach hurts when I eat and when I don't eat, sometimes I throw up\". Pt later clarified that she has menstrual cramps but denies needing pain medication or hot packs for this.     When asked about how she is feeling regarding her father, pt appears anxious and states \"if my dad dies, I just want to die then\". Pt contracts for safety on the unit. This writer attempted to explain that father is not imminently dying with little understanding resulting from pt. Pt reports that time with  yesterday helped and is agreeable to speaking with  again today.     Pt also states that \"at night I always feel like I want to scratch/hurt myself more\". She states that \"usually I tell staff after it happens, but I'm not the type of person who wants to bother anyone\". This writer explained that she is not bothering staff and we are here to help keep her safe. Pt agreed that she will try to come to staff before she does anything next time she feels like hurting herself. Pt has a few very superficial cuts on left forearm. All closed and look to be healing well.    Later in the shift, pt presents to desk looking depressed and when asked pt states she is still anxious about her father's health after talking with the . Pt agreed that walking and talking with writer would help. Pt seemed better after writer listened to her talk about it for awhile.   "

## 2019-11-27 NOTE — PROGRESS NOTES
"Participated in Music Therapy group with focus on mood elevation, validation and decreasing anxiety and improved group cohesiveness. Engaged and cooperative in music listening interventions.   Showed progress in session goals.     Chose the song \"Butterfly Kisses\" about a girl's dad dying and stated it was a very intense song for her, but it would mean a lot for her to listen to.  Tearful, but appeared to regulate by group end.  "

## 2019-11-27 NOTE — PLAN OF CARE
"Problem: OT General Care Plan  Goal: OT Goal 1  Description  Pt will practice using >2 coping strategies to manage stress and reduce symptoms to demonstrate increased readiness for discharge.      Pt attended 3 out of 3 OT groups offered. Pt actively participated in occupational therapy clinic. Pt was able to ask for assistance as needed, and independently initiate familiar structured task. Pt demonstrated good focus and appeared comfortable interacting with peers. Pt attended and participated in a structured occupational therapy group session with a focus on gratitude and thankfulness. Initially, pt states, \"I'm not grateful for anything\". Towards the end of the group when prompted, pt was able to identify the people they are thankful for: \"family and friends\". Pt completed a gratitude project with good focus, attention to task, and creativity. Pt actively participated in a structured occupational therapy group with a focus on self-reflection via journal prompts. Pt was offered journal pages with topics including planning, reflecting, positivity/optimism, learning, productivity, social supports, goals for the future, laughter, and asking for help. Pt constructed a personal journal using various materials and focused on the writing task for the full duration of group. Pt was invited to share information or reflections, and chose to share \"the color yellow represents my grandma and the color red represents me\". Social with others throughout duration of group.       "

## 2019-11-27 NOTE — PROGRESS NOTES
"SPIRITUAL HEALTH SERVICES  SPIRITUAL ASSESSMENT Progress Note  [facility] ([facility]) 4AW     REFERRAL SOURCE: Hospital  support for emotional support.    I reflected with Sadaf regarding her dad's health condition and her fears of his imminent death. She stated that she \"dreamed he was dead\" last night. We discussed being sick and frail versus imminent death.  Sadaf did not push back on that as much as she did yesterday.     Sadaf revealed that the \"bad thing\" she said to her dad was \"I wish you were dead\".   We discussed that saying that, does not make someone die.   She \"feels bad and guiltily\".  We discussed examples of forgiveness (forgiving yourself, being forgiven by who you offended and the Latter day belief of forgiven by God).      I told Sadaf she seemed like she was doing better at accepting \"the realities of her dad's health\" and she agreed, but said she was \"holding things in\" and may have to \"open up\" later today.    We prayed for Sadaf's dad and her ability to be able to forgiver herself and receive forgiveness.      PLAN:  services remain available.    Morgan Wilcox  Chaplain Resident  Pager [phone]    "

## 2019-11-27 NOTE — PROGRESS NOTES
"[facility] [place]   Psychiatric Progress Note        Interim History:   The patient's care was discussed with the treatment team during the daily team meeting and/or staff's chart notes were reviewed.      Psychiatric symptoms and interventions:  Patient denied having active intent or plan to harm her schoolmate, however did indicate having passive ideation. She tells me about some on and off overwhelming urge to bite herself or cut herself. Was worried about loosing her father to Diabetes and is worried that she will not see him if she were to go home. Denied current SI/AH/VH.       Medical: Hcg is negative, Admission labs unremarkable.      Behavioral/psychology/social:  Encouraged patient to attend therapeutic hospital programming as tolerated.   No room mate order due to her making threatening statements about her peer at [employer]. Patient is impulsive.             Medications:       naltrexone  50 mg Oral Daily     nicotine  1 patch Transdermal Daily     nicotine   Transdermal Q8H     nicotine   Transdermal Daily     QUEtiapine ER  400 mg Oral At Bedtime     venlafaxine  300 mg Oral Daily     Vitamin D3  1,000 Units Oral Daily          Allergies:     Allergies   Allergen Reactions     Penicillins Rash          Labs:     No results found for this or any previous visit (from the past 24 hour(s)).       Psychiatric Examination:     /85   Pulse 90   Temp 97.9  F (36.6  C) (Tympanic)   Resp 16   Ht 1.626 m (5' 4\")   Wt 90.6 kg (199 lb 11.8 oz)   SpO2 97%   BMI 34.28 kg/m    Weight is 199 lbs 11.79 oz  Body mass index is 34.28 kg/m .  Orthostatic Vitals     None          Appearance: awake, alert and disheveled   Attitude:  guarded and uncooperative  Eye Contact:  fair  Mood:  depressed  Affect:  intensity is blunted  Speech:  normal prosody  Psychomotor Behavior:  no evidence of tardive dyskinesia, dystonia, or tics  Throught Process:  linear  Associations:  no loose " associations  Thought Content: No current SI/HI/AH/VH.   Insight:  limited  Judgement:  limited  Oriented to:  time, person, and place  Attention Span and Concentration:  fair  Recent and Remote Memory:  fair     Clinical Global Impressions  First:  Considering your total clinical experience with this particular patient population, how severe are the patient's symptoms at this time?: 3 (11/20/19 1548)  Compared to the patient's condition at the START of treatment, this patient's condition is:: 3 (11/20/19 1548)  Most recent:  Considering your total clinical experience with this particular patient population, how severe are the patient's symptoms at this time?: 3 (11/20/19 1548)  Compared to the patient's condition at the START of treatment, this patient's condition is:: 3 (11/20/19 1548)         Precautions:     Behavioral Orders   Procedures     Assault precautions     Patient is reporting she wants to olive her peer at "[employer]"     Code 1 - Restrict to Unit     Routine Programming     As clinically indicated     Self Injury Precaution     Status 15     Every 15 minutes.     Suicide precautions     Patients on Suicide Precautions should have a Combination Diet ordered that includes a Diet selection(s) AND a Behavioral Tray selection for Safe Tray - with utensils, or Safe Tray - NO utensils            DIagnoses:   1.  Major depressive disorder, recurrent, severe without psychosis.   2.  Borderline personality traits.   3.  Intellectual disability, IQ estimated at 82.   4.  Suicidal ideation.   5.  Homicidal ideation.          Plan:     Legal status: Discontinued 72 hour hold. Patient signed in voluntary     Assessment: Patient functions in a regressed cognitive capacity. Discussed starting Naltrexone to help attenuate her recurrent compulsive urges to bite herself and/or cut herself.     Update 11/26: Discussed potential discharge soon. Patient suddenly erupted in tears saying she doesn't want to see father  "die. Father was called, who mentions he has brittle Diabetes that is poorly controlled and is cognizant about his mortality, and feels that Sadaf should \"understand\" the uncertainty of it. He does not realize that Sadaf is a low functioning and needs concrete explanations. Parents are willing to take her back. Will try to prepare her to tentative discharge for later this week.      Medication management:   - Naltrexone 50 mg  - Continue Effexor  mg, Seroquel 400 mg.   - Nicotine Patch 7 mg      Disposition plan: Stabilize with medications, return to home. Patient has therapy  (next appt is on 12/1) and psychiatric through [facility].                Jl Connor MD  [facility] Psychiat[facility]    "

## 2019-11-27 NOTE — PROGRESS NOTES
Patient participated in group activities, and stated that she feels sad but ok. She denies suicidal ideations, hallucinations, self-injurious thoughts, anxiety, rates depression at nine, and mood at three out of ten.  Despite the fact that this patient denied having any anxiety, she reported feeling worried about her family, and her blood pressure. Overall, patient appears calm, anxious, relates more with staff members than her peers, displays bright affect, and accepts redirections.     11/26/19 0461   Behavioral Health   Hallucinations denies / not responding to hallucinations   Thinking distractable;poor concentration   Orientation person: oriented;place: oriented   Memory baseline memory   Insight admits / accepts   Judgement impaired   Eye Contact at examiner   Affect full range affect   Mood mood is calm   Physical Appearance/Attire appears stated age;attire appropriate to age and situation   Hygiene well groomed   Suicidality other (see comments)  (Pt denies)   1. Wish to be Dead (Recent) No   2. Non-Specific Active Suicidal Thoughts (Recent) No   Self Injury other (see comment)  (Pt denies)   Elopement   (No concerning behaviors)   Activity other (see comment)  (Participates)   Speech clear;coherent   Medication Sensitivity no observed side effects;no stated side effects   Psychomotor / Gait balanced;steady   Coping/Psychosocial   Verbalized Emotional State acceptance;depression   Safety   Suicidality Status 15   Assault status 15   Elopement status 15   Activities of Daily Living   Hygiene/Grooming independent   Oral Hygiene independent   Dress independent   Room Organization independent

## 2019-11-27 NOTE — PLAN OF CARE
BEHAVIORAL TEAM DISCUSSION    Participants: 4A Provider: Dr. Jl Connor MD; 4A RN's: Marleni Edmonds RN; 4A CTC's: Jenni Ibarra ([facility]).  Progress: Improving.  Continued Stay Criteria/Rationale: Pt continues to report she is not safe enough to discharge  Medical/Physical: None  Precautions:    Behavioral Orders   Procedures    Assault precautions     Patient is reporting she wants to olive her peer at [facility]    Code 1 - Restrict to Unit    Routine Programming     As clinically indicated    Self Injury Precaution    Status 15     Every 15 minutes.    Suicide precautions     Patients on Suicide Precautions should have a Combination Diet ordered that includes a Diet selection(s) AND a Behavioral Tray selection for Safe Tray - with utensils, or Safe Tray - NO utensils       Plan: CTC will coordinate disposition and after care planning.  The following services will be provided to the patient; psychiatric assessment, medication management, therapeutic milieu, individual and group support, art therapy, and skills/OT groups.   Rationale for change in precautions or plan: No Change.

## 2019-11-28 PROCEDURE — 25000132 ZZH RX MED GY IP 250 OP 250 PS 637: Performed by: PSYCHIATRY & NEUROLOGY

## 2019-11-28 PROCEDURE — 12400001 ZZH R&B MH UMMC

## 2019-11-28 PROCEDURE — 25000132 ZZH RX MED GY IP 250 OP 250 PS 637: Performed by: CLINICAL NURSE SPECIALIST

## 2019-11-28 RX ADMIN — NICOTINE 1 PATCH: 7 PATCH TRANSDERMAL at 09:04

## 2019-11-28 RX ADMIN — NALTREXONE HYDROCHLORIDE 50 MG: 50 TABLET, FILM COATED ORAL at 08:59

## 2019-11-28 RX ADMIN — MELATONIN 1000 UNITS: at 08:59

## 2019-11-28 RX ADMIN — QUETIAPINE 400 MG: 400 TABLET, FILM COATED, EXTENDED RELEASE ORAL at 20:13

## 2019-11-28 RX ADMIN — VENLAFAXINE HYDROCHLORIDE 300 MG: 150 CAPSULE, EXTENDED RELEASE ORAL at 08:59

## 2019-11-28 ASSESSMENT — ACTIVITIES OF DAILY LIVING (ADL)
HYGIENE/GROOMING: INDEPENDENT
ORAL_HYGIENE: INDEPENDENT
ORAL_HYGIENE: INDEPENDENT
DRESS: INDEPENDENT
HYGIENE/GROOMING: INDEPENDENT
DRESS: STREET CLOTHES
LAUNDRY: WITH SUPERVISION

## 2019-11-28 ASSESSMENT — MIFFLIN-ST. JEOR: SCORE: 1670.36

## 2019-11-28 NOTE — PROGRESS NOTES
"Patient has been isolative to her room today. States she is \"sleeping so I don't hurt myself.\" Endorses SI and thoughts of SIB but has not engaged in any such behaviors today. RN was notified. Patient's thoughts of self-harm appear to be a chronic issue based on her statements today and review of previous notes. Patient endorses feelings of depression and anxiety but denies any AVH.       11/28/19 1427   Behavioral Health   Hallucinations denies / not responding to hallucinations   Thinking poor concentration   Orientation person: oriented;place: oriented   Memory baseline memory   Insight poor   Judgement impaired   Affect blunted, flat;sad   Mood depressed   Physical Appearance/Attire attire appropriate to age and situation   Hygiene neglected grooming - unclean body, hair, teeth   Suicidality thoughts only   1. Wish to be Dead (Recent) Yes   2. Non-Specific Active Suicidal Thoughts (Recent) Yes   Self Injury thoughts only   Elopement   (n/a)   Activity withdrawn   Speech clear;coherent   Medication Sensitivity no stated side effects;no observed side effects   Psychomotor / Gait balanced;steady   Activities of Daily Living   Hygiene/Grooming independent   Oral Hygiene independent   Dress street clothes   Laundry with supervision   Room Organization independent   Grady Heredia on 11/28/2019 at 3:05 PM    "

## 2019-11-28 NOTE — PROGRESS NOTES
"Patient verbalized thoughts of SI and SIB.  Patient verbalized she was willing to notify staff before she engaged in SIB.  Staff is notified the patient was observed with a plastic knife, while in the kitchen and had scratched her wrist.  Patient is approached.  Patient denied having the knife.  Skin is intact.  Told to empty her pocket.  Patient had a plastic knife in her pocket.  Patient began to cry.  Put her hoodie over her head. Stated no one cared about her. Room room is searched.  No silverware is found.  Silverware is removed from the kitchen.  Team is updated to monitor patient and the milieu closely. Patient will remain on status 15.    Patient spoke with a PA.  Stated that she would take her medication from another staff.  Patient stated, \"I don't like black people.\"  This RN, patient assigned nurse is black.  Patients medications will be given by another nurse.   "

## 2019-11-28 NOTE — PROGRESS NOTES
"Patient had a difficult shift. She currently endorses SI, SIB and did actively engage in self harm. She was initially upset due to her inability to get her nighttime medications early when she asked around 1800. This lead to her becoming upset with her nurse and threatening to refuse all medications in the future. Pt continued to perseverate on her medications and her dislike of her nurse. She subsequently self harmed with a plastic utensil. Upon interview with writer, Pt demonstrated limited insight into her behavior, placing blame for her actions on her nurse. Pt eventually stated \"I don't like black people\" as an explanation for her dislike of her nurse. She continued to demand a different nurse in order for her to accept medications. Another nurse was able to administer her medications which she took without incident. Pt is now asleep.      11/27/19 9064   Behavioral Health   Hallucinations denies / not responding to hallucinations   Thinking poor concentration;distractable   Orientation person: oriented;place: oriented;date: oriented;time: oriented   Memory baseline memory   Insight poor   Judgement impaired   Eye Contact at examiner   Affect blunted, flat   Mood irritable;depressed   Physical Appearance/Attire appears stated age   Hygiene neglected grooming - unclean body, hair, teeth   Suicidality thoughts only   1. Wish to be Dead (Recent) Yes   2. Non-Specific Active Suicidal Thoughts (Recent) Yes   Self Injury active   Elopement   (none indicated)   Activity   (active)   Speech clear;coherent   Medication Sensitivity no stated side effects;no observed side effects   Psychomotor / Gait balanced;steady   Activities of Daily Living   Hygiene/Grooming independent   Oral Hygiene independent   Dress street clothes   Laundry with supervision   Room Organization independent     "

## 2019-11-28 NOTE — PROGRESS NOTES
Patient stated she always has some thoughts of suicide, but feels the thoughts are manageable--states sleeping helps.  Has been intermittently visible and often smiling and laughing with staff.  States she will come to staff if unable to manage emotions.

## 2019-11-28 NOTE — PROGRESS NOTES
11/27/19 2200   Therapeutic Recreation   Type of Intervention structured groups   Activity game   Response Participates, initiates socially appropriate   Hours 0.5     Pt actively participated in a structured Therapeutic Recreation group with a focus on leisure participation, stress reduction, and social engagement via a group game. Pt remained focused and engaged for approximately half of the group.  Showed progress in session goals. Pt mood was calm and was appropriate with interactions.

## 2019-11-29 VITALS
WEIGHT: 201.8 LBS | HEIGHT: 64 IN | HEART RATE: 72 BPM | RESPIRATION RATE: 16 BRPM | SYSTOLIC BLOOD PRESSURE: 114 MMHG | DIASTOLIC BLOOD PRESSURE: 76 MMHG | BODY MASS INDEX: 34.45 KG/M2 | TEMPERATURE: 97.5 F | OXYGEN SATURATION: 100 %

## 2019-11-29 PROCEDURE — G0177 OPPS/PHP; TRAIN & EDUC SERV: HCPCS

## 2019-11-29 PROCEDURE — 25000132 ZZH RX MED GY IP 250 OP 250 PS 637: Performed by: PSYCHIATRY & NEUROLOGY

## 2019-11-29 RX ORDER — VENLAFAXINE HYDROCHLORIDE 150 MG/1
300 CAPSULE, EXTENDED RELEASE ORAL DAILY
Qty: 60 CAPSULE | Refills: 1 | Status: SHIPPED | OUTPATIENT
Start: 2019-11-30 | End: 2021-12-13

## 2019-11-29 RX ORDER — HYDROXYZINE HYDROCHLORIDE 25 MG/1
25-50 TABLET, FILM COATED ORAL 3 TIMES DAILY PRN
Qty: 60 TABLET | Refills: 1 | Status: SHIPPED | OUTPATIENT
Start: 2019-11-29 | End: 2021-05-01

## 2019-11-29 RX ORDER — NALTREXONE HYDROCHLORIDE 50 MG/1
50 TABLET, FILM COATED ORAL DAILY
Qty: 30 TABLET | Refills: 1 | Status: SHIPPED | OUTPATIENT
Start: 2019-11-30 | End: 2021-05-01

## 2019-11-29 RX ORDER — QUETIAPINE 400 MG/1
400 TABLET, FILM COATED, EXTENDED RELEASE ORAL AT BEDTIME
Qty: 30 TABLET | Refills: 1 | Status: SHIPPED | OUTPATIENT
Start: 2019-11-29 | End: 2021-05-01

## 2019-11-29 RX ADMIN — VENLAFAXINE HYDROCHLORIDE 300 MG: 150 CAPSULE, EXTENDED RELEASE ORAL at 08:44

## 2019-11-29 RX ADMIN — NALTREXONE HYDROCHLORIDE 50 MG: 50 TABLET, FILM COATED ORAL at 08:44

## 2019-11-29 RX ADMIN — NICOTINE 1 PATCH: 7 PATCH TRANSDERMAL at 08:44

## 2019-11-29 RX ADMIN — MELATONIN 1000 UNITS: at 08:44

## 2019-11-29 NOTE — PROGRESS NOTES
Pt is discharging this evening. Pt became tearful when she was asked to call he mom for discharged. Parents were called and said they will be here to pick her up at 4pm. Pt attended groups today. She was social with peers and appeared to be enjoying playing games with her peers in the lounge. Pt AVS was printed and discharge pharmacy said they would be up by 3pm. Pt vitals were within normal limits.

## 2019-11-29 NOTE — PROGRESS NOTES
11/28/19 2100   Behavioral Health   Hallucinations denies / not responding to hallucinations   Thinking poor concentration   Orientation person: oriented;place: oriented;date: oriented;time: oriented   Memory baseline memory   Insight poor   Judgement impaired   Eye Contact at examiner   Affect full range affect   Mood mood is calm   Physical Appearance/Attire attire appropriate to age and situation   Hygiene neglected grooming - unclean body, hair, teeth   Suicidality other (see comments)  (none reported or observed)   Self Injury other (see comment)  (none reproted or observed)   Elopement   (no concerns)   Activity other (see comment)  (pt was social in the milieu)   Speech clear;coherent   Medication Sensitivity no stated side effects;no observed side effects   Psychomotor / Gait balanced;steady   Activities of Daily Living   Hygiene/Grooming independent   Oral Hygiene independent   Dress independent   Room Organization independent     Pt was very social with peers and staff this evening and appeared to display full range of affect. Pt was cooperative and helped clean the lounge in order to help staff. Pt needed some redirection for drug talk, but could change conversations to be about her animals. Pt worked on word finds as a coping skill. Pt did not report any concerns aside from not liking a peer in [facility]. Pt ate her dinner.

## 2019-11-29 NOTE — PROGRESS NOTES
"Staff noticed that pts silverware was not on breakfast tray when pt returned tray to cart. Pt asked where her silverware went, she stated, \"in the garbage.\" Pt was then asked to empty pockets, of which pt pulled out one plastic knife. Of note, pt used plastic knife to harm self a few days ago. Pt gave knife to staff with no issue. Pt was talked to about returning silverware to tray after meals to keep her safe while here in the hospital. Discussed with RN to prevent pt from receiving silverware at all on meal trays and ordering all finger foods. Will thoroughly search room today for other silverware or potential self harm items.    "

## 2019-11-29 NOTE — PROGRESS NOTES
Around 10:20am, writer approached by another pt with a broken toothbrush, of which pt states they found this next to Sadaf when in the dining room, and presumably it fell out of her pocket. End of toothbrush is rough, which could be used to self harm. RN notified. Toothbrush confiscated by staff.

## 2019-11-29 NOTE — PROGRESS NOTES
"Pt was discharged from unit 4AW 11/29/19 at 1600. She was picked up by her mother who she states is supportive of her. She was tearful upon discharge when saying goodbye to her new friends she has made here. Pt identified coping mechanisms she can utilize like looking for support from her parents and externalizing her feelings. Pt had a bright affect when signing her AVS summary. She was excited to leave and stated she was going to, \"go home and smoke some weed.\" Writer provided some education. Pt's personal belongings were returned to her, she got her mediations from discharge pharmacy, and she was given adequate time to ask questions regarding her medications (she did not have any).   "

## 2019-11-29 NOTE — DISCHARGE INSTRUCTIONS
Behavioral Discharge Planning and Instructions      Summary:  You were admitted on 11/19/2019  due to Suicidal Ideations and Homicidal Ideations/Threatening Behaviors.  You were treated by Debra Naegele, APRN, CNS and discharged on 11/29/19 from Station 4A to Home      Principal Diagnosis:   Major depressive disorder, recurrent, severe without psychosis.   Borderline personality traits.   Intellectual disability, IQ estimated at 82.        Health Care Follow-up Appointments:   Medication Management:   Date/Time: Monday, December 9th@4:00pm    Provider: Cathie Alves  Address: [address] [place] [address]  Phone:[phone]  Fax: [phone]     Individual Therapy:   Date/Time: Wednesday, December 11th@3:00pm  Provider: MARIANNE Guy  Address: [address] [place] [address]  Phone: [phone]    @R: Gaby Alcantar [phone]    Attend all scheduled appointments with your outpatient providers. Call at least 24 hours in advance if you need to reschedule an appointment to ensure continued access to your outpatient providers.   Major Treatments, Procedures and Findings:  You were provided with: a psychiatric assessment, assessed for medical stability, medication evaluation and/or management and group therapy    Symptoms to Report: feeling more aggressive, increased confusion, losing more sleep, mood getting worse or thoughts of suicide    Early warning signs can include: increased depression or anxiety sleep disturbances increased thoughts or behaviors of suicide or self-harm  increased unusual thinking, such as paranoia or hearing voices    Safety and Wellness:  Take all medicines as directed.  Make no changes unless your doctor suggests them.      Follow treatment recommendations.  Refrain from alcohol and non-prescribed drugs.  Ask your support system to help you reduce your access to items that could harm yourself or others. If there is a concern for  "safety, call 911.    Resources:   Crisis Intervention: 290.737.8548 or 832-192-4116 (TTY: 942.321.6122).  Call anytime for help.  National [place] on Mental Illness (www.mn.celine.org): 304.132.5678 or 237-712-4084.  National Suicide Prevention Line (www.mentalhealthmn.org): 604-546-PSPO (2853)  [facility] Crisis Response - Adult 473 875-7510  Text 4 Life: txt \"LIFE\" to 35879 for immediate support and crisis intervention  Crisis text line: Text \"[place]\" to 347306. Free, confidential, 24/7.  Crisis Intervention: 122.470.9817 or 854-353-5487. Call anytime for help.       The treatment team has appreciated the opportunity to work with you.     If you have any questions or concerns our unit number is [phone]        "

## 2019-12-11 ENCOUNTER — HOSPITAL ENCOUNTER (EMERGENCY)
Facility: CLINIC | Age: 19
Discharge: ANOTHER HOSPITAL | End: 2019-12-12
Attending: FAMILY MEDICINE | Admitting: FAMILY MEDICINE
Payer: COMMERCIAL

## 2019-12-11 DIAGNOSIS — R41.83 BORDERLINE INTELLECTUAL DISABILITY: ICD-10-CM

## 2019-12-11 DIAGNOSIS — F32.A DEPRESSION, UNSPECIFIED DEPRESSION TYPE: ICD-10-CM

## 2019-12-11 DIAGNOSIS — R45.851 SUICIDAL IDEATION: ICD-10-CM

## 2019-12-11 PROCEDURE — 80307 DRUG TEST PRSMV CHEM ANLYZR: CPT | Performed by: FAMILY MEDICINE

## 2019-12-11 PROCEDURE — 90791 PSYCH DIAGNOSTIC EVALUATION: CPT

## 2019-12-11 PROCEDURE — 99285 EMERGENCY DEPT VISIT HI MDM: CPT | Mod: 25 | Performed by: FAMILY MEDICINE

## 2019-12-11 PROCEDURE — 81025 URINE PREGNANCY TEST: CPT | Performed by: FAMILY MEDICINE

## 2019-12-11 PROCEDURE — 99285 EMERGENCY DEPT VISIT HI MDM: CPT | Mod: Z6 | Performed by: FAMILY MEDICINE

## 2019-12-11 PROCEDURE — 80320 DRUG SCREEN QUANTALCOHOLS: CPT | Performed by: FAMILY MEDICINE

## 2019-12-11 ASSESSMENT — ENCOUNTER SYMPTOMS
NERVOUS/ANXIOUS: 1
SHORTNESS OF BREATH: 0
AGITATION: 1
FEVER: 0
DYSPHORIC MOOD: 1
ABDOMINAL PAIN: 0
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
## 2019-12-11 NOTE — ED NOTES
"ED to Behavioral Floor Handoff    SITUATION  Sadaf Ross is a 19 year old female who speaks English and lives in a home with family members The patient arrived in the ED by ambulance from home with a complaint of Suicidal (threatened to cut self)  .The patient's current symptoms started/worsened  \"couple day(s) ago and during this time the symptoms have increased.   In the ED, pt was diagnosed with   Final diagnoses:   Borderline intellectual disability   Depression, unspecified depression type   Suicidal ideation        Initial vitals were: BP: 120/68  Pulse: 96  Temp: 97  F (36.1  C)  Resp: 16   --------  Is the patient diabetic? No   If yes, last blood glucose? --     If yes, was this treated in the ED? --  --------  Is the patient inebriated (ETOH) No or Impaired on other substances? No  MSSA done? N/A  Last MSSA score: --    Were withdrawal symptoms treated? N/A  Does the patient have a seizure history? Yes. If yes, date of most recent seizure--  --------  Is the patient patient experiencing suicidal ideation? reports occasional suicidal thoughts representing feeling that life is not worth feeling    Homicidal ideation? denies current or recent homicidal ideation or behaviors.    Self-injurious behavior/urges? denies current or recent self injurious behavior or ideation.  ------  Was pt aggressive in the ED No  Was a code called No  Is the pt now cooperative? Yes  -------  Meds given in ED: Medications - No data to display   Family present during ED course? No  Family currently present? No    BACKGROUND  Does the patient have a cognitive impairment or developmental disability? No  Allergies:   Allergies   Allergen Reactions     Penicillins Rash   .   Social demographics are   Social History     Socioeconomic History     Marital status: Single     Spouse name: None     Number of children: None     Years of education: None     Highest education level: None   Occupational History     None   Social Needs     " Financial resource strain: None     Food insecurity:     Worry: None     Inability: None     Transportation needs:     Medical: None     Non-medical: None   Tobacco Use     Smoking status: Current Some Day Smoker     Years: 5.00     Smokeless tobacco: Never Used   Substance and Sexual Activity     Alcohol use: No     Drug use: No     Sexual activity: Never   Lifestyle     Physical activity:     Days per week: None     Minutes per session: None     Stress: None   Relationships     Social connections:     Talks on phone: None     Gets together: None     Attends Anglican service: None     Active member of club or organization: None     Attends meetings of clubs or organizations: None     Relationship status: None     Intimate partner violence:     Fear of current or ex partner: None     Emotionally abused: None     Physically abused: None     Forced sexual activity: None   Other Topics Concern     None   Social History Narrative     None        ASSESSMENT  Labs results   Labs Ordered and Resulted from Time of ED Arrival Up to the Time of Departure from the ED   HCG QUALITATIVE URINE   DRUG ABUSE SCREEN 6 CHEM DEP URINE ([facility])      Imaging Studies: No results found for this or any previous visit (from the past 24 hour(s)).   Most recent vital signs /68   Pulse 96   Temp 97  F (36.1  C) (Oral)   Resp 16   LMP 12/10/2019    Abnormal labs/tests/findings requiring intervention:---   Pain control: pt had none  Nausea control: pt had none    RECOMMENDATION  Are any infection precautions needed (MRSA, VRE, etc.)? No If yes, what infection? --  ---  Does the patient have mobility issues? independently. If yes, what device does the pt use? ---  ---  Is patient on 72 hour hold or commitment? No If on 72 hour hold, have hold and rights been given to patient? N/A  Are admitting orders written if after 10 p.m. ?N/A  Tasks needing to be completed:---     Ayla Peña RN   ascom-- [phone] [facility]   [phone] [facility] ED

## 2019-12-11 NOTE — ED NOTES
Patient arrives to [facility]. Psych Associate explains process. Patient told about meeting with Mental Health  and Psychiatrist. Patient told about 2-5 hour time frame for complete evaluation. Patient offered fluids, nutrition, and comfort measures. Patient told about continuous video observation in room.
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
## 2019-12-11 NOTE — PHARMACY-ADMISSION MEDICATION HISTORY
Admission medication history for the December 11, 2019 admission is complete.     Interview Sources:  Patient, discharge from [facility] on 11/29/19    Reliability of Source: Good    Medication Adherence: Good - except she has not been taking the vitamin D    Current Outpatient Pharmacy: [facility] [id number] [place] [address]    Changes made to PTA medication list (reason)  Added: none  Deleted: none  Changed: none    Additional medication history information:   The patient was a reliable historian. She reported her medications had not changed since her discharge from [facility] on 11/29/19.    Prior to Admission Medication List:  Prior to Admission medications    Medication Sig Last Dose Taking? Auth Provider   hydrOXYzine (ATARAX) 25 MG tablet Take 1-2 tablets (25-50 mg) by mouth 3 times daily as needed for itching Past Week at Unknown time Yes Jl Connor MD   naltrexone (DEPADE/REVIA) 50 MG tablet Take 1 tablet (50 mg) by mouth daily 12/11/2019 at Unknown time Yes Jl Connor MD   nicotine (NICODERM CQ) 7 MG/24HR 24 hr patch Place 1 patch onto the skin daily 12/10/2019 at Unknown time Yes Jl Connor MD   polyethylene glycol (MIRALAX/GLYCOLAX) packet Take 1 packet by mouth daily as needed for constipation Past Month at Unknown time Yes Unknown, Entered By History   QUEtiapine ER (SEROQUEL XR) 400 MG 24 hr tablet Take 1 tablet (400 mg) by mouth At Bedtime 12/10/2019 at Unknown time Yes Jl Connor MD   venlafaxine (EFFEXOR-XR) 150 MG 24 hr capsule Take 2 capsules (300 mg) by mouth daily 12/11/2019 at Unknown time Yes Jl Connor MD   Vitamin D, Cholecalciferol, 25 MCG (1000 UT) TABS Take 1,000 Units by mouth daily  Past Month at Unknown time Yes Reported, Patient       Time spent: 25 minutes    Medication history completed by:   Vanda Lopez, PharmD, BCPP  Behavioral Health Pharmacist  [facility]  [facility] ([facility])  Emergency Room Ascom: *[phone]  Emergency Room Pharmacist phone: [phone]

## 2019-12-11 NOTE — ED NOTES
Bed: HW04  Expected date:   Expected time:   Means of arrival:   Comments:  [facility] 19[facility]

## 2019-12-12 ENCOUNTER — HOSPITAL ENCOUNTER (INPATIENT)
Facility: CLINIC | Age: 19
LOS: 11 days | Discharge: HOME | DRG: 885 | End: 2019-12-23
Attending: PSYCHIATRY & NEUROLOGY | Admitting: PSYCHIATRY & NEUROLOGY
Payer: COMMERCIAL

## 2019-12-12 VITALS
TEMPERATURE: 98.1 F | DIASTOLIC BLOOD PRESSURE: 75 MMHG | SYSTOLIC BLOOD PRESSURE: 142 MMHG | RESPIRATION RATE: 16 BRPM | OXYGEN SATURATION: 97 % | HEART RATE: 89 BPM

## 2019-12-12 DIAGNOSIS — R45.851 SUICIDAL IDEATION: Primary | ICD-10-CM

## 2019-12-12 LAB
ANION GAP SERPL CALCULATED.3IONS-SCNC: 4 MMOL/L (ref 3–14)
BASOPHILS # BLD AUTO: 0 10E9/L (ref 0–0.2)
BASOPHILS NFR BLD AUTO: 0.2 %
BUN SERPL-MCNC: 12 MG/DL (ref 7–30)
CALCIUM SERPL-MCNC: 9.1 MG/DL (ref 8.5–10.1)
CHLORIDE SERPL-SCNC: 105 MMOL/L (ref 94–109)
CO2 SERPL-SCNC: 26 MMOL/L (ref 20–32)
CREAT SERPL-MCNC: 0.69 MG/DL (ref 0.52–1.04)
DIFFERENTIAL METHOD BLD: NORMAL
EOSINOPHIL # BLD AUTO: 0.2 10E9/L (ref 0–0.7)
EOSINOPHIL NFR BLD AUTO: 1.7 %
ERYTHROCYTE [DISTWIDTH] IN BLOOD BY AUTOMATED COUNT: 13 % (ref 10–15)
GFR SERPL CREATININE-BSD FRML MDRD: >90 ML/MIN/{1.73_M2}
GLUCOSE SERPL-MCNC: 91 MG/DL (ref 70–99)
HCT VFR BLD AUTO: 40.2 % (ref 35–47)
HGB BLD-MCNC: 13.4 G/DL (ref 11.7–15.7)
IMM GRANULOCYTES # BLD: 0 10E9/L (ref 0–0.4)
IMM GRANULOCYTES NFR BLD: 0.3 %
LYMPHOCYTES # BLD AUTO: 2.6 10E9/L (ref 0.8–5.3)
LYMPHOCYTES NFR BLD AUTO: 29 %
MCH RBC QN AUTO: 29.5 PG (ref 26.5–33)
MCHC RBC AUTO-ENTMCNC: 33.3 G/DL (ref 31.5–36.5)
MCV RBC AUTO: 88 FL (ref 78–100)
MONOCYTES # BLD AUTO: 0.6 10E9/L (ref 0–1.3)
MONOCYTES NFR BLD AUTO: 7 %
NEUTROPHILS # BLD AUTO: 5.5 10E9/L (ref 1.6–8.3)
NEUTROPHILS NFR BLD AUTO: 61.8 %
NRBC # BLD AUTO: 0 10*3/UL
NRBC BLD AUTO-RTO: 0 /100
PLATELET # BLD AUTO: 236 10E9/L (ref 150–450)
POTASSIUM SERPL-SCNC: 3.9 MMOL/L (ref 3.4–5.3)
RBC # BLD AUTO: 4.55 10E12/L (ref 3.8–5.2)
SODIUM SERPL-SCNC: 135 MMOL/L (ref 133–144)
TSH SERPL DL<=0.005 MIU/L-ACNC: 2.17 MU/L (ref 0.4–4)
WBC # BLD AUTO: 8.9 10E9/L (ref 4–11)

## 2019-12-12 PROCEDURE — 25000132 ZZH RX MED GY IP 250 OP 250 PS 637: Performed by: PSYCHIATRY & NEUROLOGY

## 2019-12-12 PROCEDURE — 85025 COMPLETE CBC W/AUTO DIFF WBC: CPT | Performed by: PHYSICIAN ASSISTANT

## 2019-12-12 PROCEDURE — 12400000 ZZH R&B MH

## 2019-12-12 PROCEDURE — 84443 ASSAY THYROID STIM HORMONE: CPT | Performed by: PHYSICIAN ASSISTANT

## 2019-12-12 PROCEDURE — 99222 1ST HOSP IP/OBS MODERATE 55: CPT | Mod: AI | Performed by: PHYSICIAN ASSISTANT

## 2019-12-12 PROCEDURE — 36415 COLL VENOUS BLD VENIPUNCTURE: CPT | Performed by: PHYSICIAN ASSISTANT

## 2019-12-12 PROCEDURE — 80048 BASIC METABOLIC PNL TOTAL CA: CPT | Performed by: PHYSICIAN ASSISTANT

## 2019-12-12 RX ORDER — QUETIAPINE 200 MG/1
400 TABLET, FILM COATED, EXTENDED RELEASE ORAL AT BEDTIME
Status: DISCONTINUED | OUTPATIENT
Start: 2019-12-12 | End: 2019-12-23 | Stop reason: HOSPADM

## 2019-12-12 RX ORDER — POLYETHYLENE GLYCOL 3350 17 G/17G
17 POWDER, FOR SOLUTION ORAL DAILY PRN
Status: DISCONTINUED | OUTPATIENT
Start: 2019-12-12 | End: 2019-12-23 | Stop reason: HOSPADM

## 2019-12-12 RX ORDER — ARIPIPRAZOLE 2 MG/1
2 TABLET ORAL EVERY MORNING
Status: DISCONTINUED | OUTPATIENT
Start: 2019-12-12 | End: 2019-12-13

## 2019-12-12 RX ORDER — NALTREXONE HYDROCHLORIDE 50 MG/1
50 TABLET, FILM COATED ORAL DAILY
Status: DISCONTINUED | OUTPATIENT
Start: 2019-12-12 | End: 2019-12-23 | Stop reason: HOSPADM

## 2019-12-12 RX ORDER — HYDROXYZINE HYDROCHLORIDE 25 MG/1
25-50 TABLET, FILM COATED ORAL 3 TIMES DAILY PRN
Status: DISCONTINUED | OUTPATIENT
Start: 2019-12-12 | End: 2019-12-23 | Stop reason: HOSPADM

## 2019-12-12 RX ORDER — VENLAFAXINE HYDROCHLORIDE 150 MG/1
300 CAPSULE, EXTENDED RELEASE ORAL DAILY
Status: DISCONTINUED | OUTPATIENT
Start: 2019-12-12 | End: 2019-12-23 | Stop reason: HOSPADM

## 2019-12-12 RX ADMIN — NALTREXONE HYDROCHLORIDE 50 MG: 50 TABLET, FILM COATED ORAL at 09:45

## 2019-12-12 RX ADMIN — ARIPIPRAZOLE 2 MG: 2 TABLET ORAL at 15:56

## 2019-12-12 RX ADMIN — QUETIAPINE FUMARATE 400 MG: 200 TABLET, EXTENDED RELEASE ORAL at 01:50

## 2019-12-12 RX ADMIN — QUETIAPINE FUMARATE 400 MG: 200 TABLET, EXTENDED RELEASE ORAL at 21:07

## 2019-12-12 RX ADMIN — VENLAFAXINE HYDROCHLORIDE 300 MG: 150 CAPSULE, EXTENDED RELEASE ORAL at 09:46

## 2019-12-12 ASSESSMENT — ACTIVITIES OF DAILY LIVING (ADL)
RETIRED_COMMUNICATION: 0-->UNDERSTANDS/COMMUNICATES WITHOUT DIFFICULTY
RETIRED_EATING: 0-->INDEPENDENT
DRESS: 0-->INDEPENDENT
BATHING: 0-->INDEPENDENT
HYGIENE/GROOMING: INDEPENDENT
TOILETING: 0-->INDEPENDENT
AMBULATION: 0-->INDEPENDENT
COGNITION: 0 - NO COGNITION ISSUES REPORTED
SWALLOWING: 0-->SWALLOWS FOODS/LIQUIDS WITHOUT DIFFICULTY
ORAL_HYGIENE: INDEPENDENT
TRANSFERRING: 0-->INDEPENDENT
FALL_HISTORY_WITHIN_LAST_SIX_MONTHS: NO
DRESS: SCRUBS (BEHAVIORAL HEALTH)

## 2019-12-12 ASSESSMENT — MIFFLIN-ST. JEOR: SCORE: 1676.71

## 2019-12-12 NOTE — ED NOTES
Attempted to notify patient's mother, Yarely Ross, regarding patient's admission to [facility] but Yarely was already sleeping. Message left to her daughter to inform Yarely to call ED when she wakes up in the morning.
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
## 2019-12-12 NOTE — PLAN OF CARE
Problem: Adult Behavioral Health Plan of Care  Goal: Patient-Specific Goal (Individualization)  Description  1. Mood stability   2. Absence of suicidal ideation/contracts for safety   3. Depressive s/sx resolved  4. Safety plan in place  5. Positive coping skills identified/utilized  6. Medication regiment established/compliance  7. Adequate sleep  8. Housing community support  9. F/u plan in place   Note:   Pt has been spending most of her time in her room bed resting and has been isolative to her room. Pt denies any current issues but is very blunt and flat with staff. Pt goes to groups but remains withdrawn and does not participate. Pt is cooperative and med complaint. Pt denies any SI.

## 2019-12-12 NOTE — PLAN OF CARE
"BEHAVIORAL TEAM DISCUSSION    Participants: dr thompson, RN's, CTC (v everett)  Progress: none; pt is a new admission.   Admitted due to mood instability and thoughts of harming herself and others.    She reported feeling \"bullied\" at school and threatened to harm other students and herself.  Anticipated length of stay: week  Continued Stay Criteria/Rationale: new admission  Medical/Physical: none known  Precautions:   Behavioral Orders   Procedures    Code 1 - Restrict to Unit    Routine Programming     As clinically indicated    Self Injury Precaution    Status 15     Every 15 minutes.    Suicide precautions     Patients on Suicide Precautions should have a Combination Diet ordered that includes a Diet selection(s) AND a Behavioral Tray selection for Safe Tray - with utensils, or Safe Tray - NO utensils       Plan:  meds per dr thompson.    -Assist with scheduling follow up appointments  -Coordinate with the [facility] worker  -Pt will likely return to live with parents.    -Consider Day Tx Program    Rationale for change in precautions or plan: no change at this time        "

## 2019-12-12 NOTE — PROGRESS NOTES
12/12/19 0107   Valuables   Patient Belongings locker   Patient Belongings Put in Hospital Secure Location (Security or Locker, etc.) clothing;shoes   Did you bring any home meds/supplements to the hospital?  Yes   Disposition of meds  Other (see comment)   Shoes w/ laces  Jeans  Tshirt  Tank top   Underwear  Sweatshirt w/o strings  Tablet  Sunglasses  Hat  Pack of cigarettes x 7 w/ lighter  Deodorant  Black wallet  [place] ID  Misc cards  $4 cash  Necklace   Bottle of Seroquel 300 mg  Bottle of Seroquel 400 mg  Bottle of Hydroxyzine 25 mg x 2  Bottle of Venlafaxine 150 mg  Bottle of Naltrexone 50 mg    Security envelope #[id number]  EBT   Social security card    Admission:  I am responsible for any personal items that are not sent to the safe or pharmacy. [facility] is not responsible for loss, theft or damage of any property in my possession.        Patient Signature: ___________________________________________      Date/Time:__________________________     Staff Signature: __________________________________      Date/Time:__________________________     2nd Staff person, if patient is unable/unwilling to sign:      __________________________________________________________      Date/Time: __________________________        Discharge:  [facility] has returned all of my personal belongings:     Patient Signature: ________________________________________     Date/Time: ____________________________________     Staff Signature: ______________________________________     Date/Time:_____________________________________
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
## 2019-12-12 NOTE — PROGRESS NOTES
"Initial Psychosocial Assessment     I have reviewed the chart, met with the patient, and developed Care Plan.    Historical information is from assessment done 11-20-19 by Jenni SINGH, [address].          Presenting Problem:    You were admitted to [facility] [address] [place] [address] under the care of Dr Llanes.    You were admitted due to mood instability and thoughts of harming yourself and others.    You reported feeling \"bullied\" at school and threatened to harm other students and yourself.      Circumstances regarding a11-19-19 admission to [address]:  Sadaf Ross is a 19 year old female who is here via EMS from [facility] where she allegedly had thoughts of harming a school peer. She had found a razor on the ground while on her way to school. She punched a wall instead. Patient reports wanting to be admitted as she feels unsafe going home. She did not want to be brought here as her ex-boyfriend was admitted yesterday here. (he is on [address]). He is violent. She was requesting to be sent to another hospital. When told that I could not do that, she threatened to punch me and made repeated threats of harm if she is not sent to another hospital.  She elater apologized after she took a dose of Zyprexa.     Patient has history of borderline personality disorder and intellectual disability. She has been living with parents past month. Parents reports patient has a long history of making threats of harm to herself and  others when upset/ angry.        Mental health history: Hx of depression, Intellectual Disability (IQ 82), Borderline Personality Disorder, ADHD. 6 past hospitalizations for mental health. First hospitalization at age 12 on Child[facility]cent at [facility] Most recent hospitalizations: [facility] 8/6/19-8/8/19 and [facility] 8/13/19-8/21/19 and multiple ED visits.  Pt has recent hx of biting herself and head banging recently.     Chemical use history: None known     Family " Description (Constellation, Family Psychiatric History):  Patient grew up in the [facility]. She was raised by biological parents. Parents are . Pt has two living siblings, one younger and one older. Pt had another sibling that  at birth when pt was 12. Depression and anxiety on maternal side of the family.       Significant Life Events (Illness, Abuse, Trauma, Death):  The death of pt's younger sister at her birth seems to be a traumatic life event for pt. She was 12 when this happened and was also hospitalized for the first time at age 12     Living Situation:  Patient has been living with her family for the past month. She is looking to her own place soon.      Educational Background:  Pt is in a high school transitions program where she attends [employer] in the mornings and then goes to [employer] in [place], [place].      Occupational History:  Unemployed     Financial Status:  Ins:   [employer] [employer] - it appears pt's mother is the policy ya      Legal Issues:  None known     Ethnic/Cultural Issues:  Cacuasion     Spiritual Orientation:  None identified      Service History:  Denies     Current Treatment Providers are:  Medication provider: LINN Saucedo @ [facility] in [place]  Therapist: [email or web address] in [place]  Mental Health Resources : Gaby Alcantar     [employer] program in [place].     Social Service Assessment/Social Functioning/Plan:  -Assist with scheduling follow up appointments  -Coordinate with the Mental Health Resources worker  -Pt will likely return to live with parents.    -Consider Day Tx Program

## 2019-12-13 PROCEDURE — 25000132 ZZH RX MED GY IP 250 OP 250 PS 637: Performed by: PSYCHIATRY & NEUROLOGY

## 2019-12-13 PROCEDURE — 12400000 ZZH R&B MH

## 2019-12-13 RX ORDER — AMOXICILLIN 250 MG
1-2 CAPSULE ORAL 2 TIMES DAILY PRN
Status: DISCONTINUED | OUTPATIENT
Start: 2019-12-13 | End: 2019-12-23 | Stop reason: HOSPADM

## 2019-12-13 RX ORDER — IBUPROFEN 600 MG/1
600 TABLET, FILM COATED ORAL EVERY 6 HOURS PRN
Status: DISCONTINUED | OUTPATIENT
Start: 2019-12-13 | End: 2019-12-23 | Stop reason: HOSPADM

## 2019-12-13 RX ORDER — ARIPIPRAZOLE 5 MG/1
5 TABLET ORAL EVERY MORNING
Status: DISCONTINUED | OUTPATIENT
Start: 2019-12-13 | End: 2019-12-14

## 2019-12-13 RX ADMIN — IBUPROFEN 600 MG: 600 TABLET ORAL at 14:00

## 2019-12-13 RX ADMIN — VENLAFAXINE HYDROCHLORIDE 300 MG: 150 CAPSULE, EXTENDED RELEASE ORAL at 09:03

## 2019-12-13 RX ADMIN — NALTREXONE HYDROCHLORIDE 50 MG: 50 TABLET, FILM COATED ORAL at 09:03

## 2019-12-13 RX ADMIN — ARIPIPRAZOLE 5 MG: 5 TABLET ORAL at 09:03

## 2019-12-13 RX ADMIN — QUETIAPINE FUMARATE 400 MG: 200 TABLET, EXTENDED RELEASE ORAL at 20:43

## 2019-12-13 RX ADMIN — POLYETHYLENE GLYCOL 3350 17 G: 17 POWDER, FOR SOLUTION ORAL at 13:56

## 2019-12-13 NOTE — PROGRESS NOTES
"[facility] Psychiatric Progress Note       Interim History     The patient's care was discussed with the treatment team and chart notes were reviewed. Per psych associate and nursing staff notes from yesterday evening, the patient expressed having suicidal ideation with a plan to bang her head on the window. She also noted that her father is seriously ill and that when he passes, she may end her life as well. Pt seen on 12/13/19 by Dr. Llanes. On interview, the patient declares she is doing alright this morning. She reports sleeping ok last night with no major complications. Patient also denies experiencing any side effects from the new medication, Abilify, that was started yesterday afternoon. Dr. Llanes would like to increase this medication dose from 2mg to 5mg today. Patient in agreement with this medication change. Aside from this, Dr. Llanes will be in contact with the patients parents later this morning to collect further collateral history.      Dr. Llanes contacted the patients father this morning to obtain further collateral history. Patients father reports, \"she [the patient] keeps switching her story on me...she gets attached to people way too quick...she lost her baby sister in 2011 and she took that really hard because her baby sister was only 24 minutes old when she passed away in the hospital. Ever since that, she has been getting into arguments with people...when we get into arguments, she will say she wishes I were dead and that she were dead...if kael doesn't hear what she wants to hear, she'll start to flip out and start to do stupid stuff...in her state of mind, she is not doing anything wrong, but everyone else is doing wrong. 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 Llanes explains why multi personality disorder is not the correct diagnosis for patient, rather the " "patient lines up with the diagnosis of Borderline Personality Disorder. This diagnosis is explained in significant detail. After much conversation, the patients father is in agreement. He states, \"I think she needs to be institutionalized for a certain amount of months to become more stable.\" Dr. Llanes does not agree the patient needs to be institutionalized and instead reviews the different aftercare options. Dr. Llanes believes DBT would be the patients best and most beneficial option. Patients father in agreement with this, but states, \"she'll go for a week then won't go anymore. That's why I think she needs to be institutionalized. Is there a way to commit her?\" Dr. Llanes does not deem the patient committable at this time. Will discuss DBT with patient during next meeting.        Hospital Course   This is a 19 year old single female with previous psychiatric diagnoses of major depression, anxiety, ADHD, intellectual disability, and borderline personality disorder. She has a history of multiple inpatient mental health hospitalizations, dating back to when the patient was 12 years old. Her most recent admission being from 11/19/19-11/29/19. Patient presented to [facility] ED on 12/11/19 for mental health evaluation. Prior to admission, the patient had been having difficulties at school where other students were bullying her. The bullying worsened and eventually led the patient to make suicidal comments such as that she wanted to die and that she was done with life. Patient had an active plan of using a knife to cut her throat. She was deemed appropriate for inpatient level of care for further stabilization and medication management. While meeting with Dr. Llanes this afternoon, the patient presented depressed in mood and flat in affect. Her previous and current psychiatric medications were heavily considered. Dr. Llanes reviewed the medication Abilify with the patient, such as its possible side " "effects and many benefits. This medication will particular augment the patients anti-depressant. Patient provided verbal consent, thus Ability 2mg daily was ordered.     Medications     Current Facility-Administered Medications Ordered in Epic   Medication Dose Route Frequency Last Rate Last Dose     ARIPiprazole (ABILIFY) tablet 2 mg  2 mg Oral QAM   2 mg at 12/12/19 1556     hydrOXYzine (ATARAX) tablet 25-50 mg  25-50 mg Oral TID PRN         naltrexone (DEPADE/REVIA) tablet 50 mg  50 mg Oral Daily   50 mg at 12/12/19 0945     nicotine (NICORETTE) gum 2-4 mg  2-4 mg Buccal Q1H PRN         polyethylene glycol (MIRALAX/GLYCOLAX) Packet 17 g  17 g Oral Daily PRN         QUEtiapine ER (SEROquel XR) 24 hr tablet 400 mg  400 mg Oral At Bedtime   400 mg at 12/12/19 2107     venlafaxine (EFFEXOR-XR) 24 hr capsule 300 mg  300 mg Oral Daily   300 mg at 12/12/19 0946     No current [facility]-ordered outpatient medications on file.         Allergies      Allergies   Allergen Reactions     Penicillins Rash        Medical Review of Systems     /82   Pulse 78   Temp 98  F (36.7  C) (Oral)   Resp 16   Ht 1.626 m (5' 4\")   Wt 92.2 kg (203 lb 3.2 oz)   LMP 12/10/2019   SpO2 97%   BMI 34.88 kg/m    Body mass index is 34.88 kg/m .  A 10-point review of systems was performed by Eduardo Llanes MD and is negative, no new findings.      Psychiatric Examination     Appearance Sitting in chair, dressed in hospital scrubs. Appears stated age.   Attitude Cooperative   Orientation Oriented to person, place, time   Eye Contact Fine   Speech Regular rate, rhythm, volume and tone   Language Normal   Psychomotor Behavior Normal   Mood Depressed   Affect Flat   Thought Process Goal-Oriented, Intact   Associations Intact   Thought Content Patient is currently negative for suicidal ideation, negative for plan or intent, able to contract no self harm and identify barriers to suicide.  Negative for obsessions, compulsions or psychosis.   "   Fund of Knowledge Intact   Insight Fair   Judgement Fair    Attention Span & Concentration Fair   Recent & Remote Memory Intact   Gait Normal   Muscle Tone Intact        Labs     Labs reviewed.  Recent Results (from the past 24 hour(s))   Basic metabolic panel    Collection Time: 12/12/19  9:11 AM   Result Value Ref Range    Sodium 135 133 - 144 mmol/L    Potassium 3.9 3.4 - 5.3 mmol/L    Chloride 105 94 - 109 mmol/L    Carbon Dioxide 26 20 - 32 mmol/L    Anion Gap 4 3 - 14 mmol/L    Glucose 91 70 - 99 mg/dL    Urea Nitrogen 12 7 - 30 mg/dL    Creatinine 0.69 0.52 - 1.04 mg/dL    GFR Estimate >90 >60 mL/min/[1.73_m2]    GFR Estimate If Black >90 >60 mL/min/[1.73_m2]    Calcium 9.1 8.5 - 10.1 mg/dL   CBC with platelets differential    Collection Time: 12/12/19  9:11 AM   Result Value Ref Range    WBC 8.9 4.0 - 11.0 10e9/L    RBC Count 4.55 3.8 - 5.2 10e12/L    Hemoglobin 13.4 11.7 - 15.7 g/dL    Hematocrit 40.2 35.0 - 47.0 %    MCV 88 78 - 100 fl    MCH 29.5 26.5 - 33.0 pg    MCHC 33.3 31.5 - 36.5 g/dL    RDW 13.0 10.0 - 15.0 %    Platelet Count 236 150 - 450 10e9/L    Diff Method Automated Method     % Neutrophils 61.8 %    % Lymphocytes 29.0 %    % Monocytes 7.0 %    % Eosinophils 1.7 %    % Basophils 0.2 %    % Immature Granulocytes 0.3 %    Nucleated RBCs 0 0 /100    Absolute Neutrophil 5.5 1.6 - 8.3 10e9/L    Absolute Lymphocytes 2.6 0.8 - 5.3 10e9/L    Absolute Monocytes 0.6 0.0 - 1.3 10e9/L    Absolute Eosinophils 0.2 0.0 - 0.7 10e9/L    Absolute Basophils 0.0 0.0 - 0.2 10e9/L    Abs Immature Granulocytes 0.0 0 - 0.4 10e9/L    Absolute Nucleated RBC 0.0    TSH with free T4 reflex and/or T3 as indicated    Collection Time: 12/12/19  9:11 AM   Result Value Ref Range    TSH 2.17 0.40 - 4.00 mU/L        Impression     This is a 19 year old single female with previous psychiatric diagnoses of major depression, anxiety, ADHD, intellectual disability, and borderline personality disorder. Patient presented to [facility]  ED on 12/11/19 for mental health evaluation. While meeting with Dr. Llanes this morning, the patient was again cooperative, flat in affect, and appeared depressed in mood. She denied experiencing any side effects from the new medication, Abilify, that was started yesterday afternoon. Dr. Llanes increased Abilify dose from 2mg to 5mg this morning. Patient in agreement with this medication adjustment. Dr. Llanes will be in contact with the patients parents later this morning or afternoon to obtain further collateral history and discuss aftercare plans.      Diagnoses     1. Major depression, recurrent, severe, without psychotic features.  2. Borderline Personality Disorder   3. Intellectual Disability     Plan     1. Explained side effects, benefits, and complications of medications to the patient, Pt gave verbal consent.  2. Medication changes: Increased Abilify dose to 5mg   3. Discussed treatment plan with patient and team.  4. Projected length of stay: 7+ days       Attestation:   Nimisha MCGINNIS, am serving as a scribe on 12/13/2019 to document services personally performed by Eduardo Llanes MD based on my observations and the provider's statements to me.    Patient ID:  Name: Sadaf Ross    MRN: [id number]  Admission: 12/12/2019   YOB: 2000

## 2019-12-13 NOTE — PLAN OF CARE
Patient states that she has suicidal thoughts and she has a plan to hit her head on the window. She agreed to contract for safety and let us know if she was feeling like she wanted to act on her suicidal thoughts. She attended groups and cried during the entire focus group as she was talking about her dad. In the afternoon and in the evening patient spent the entire time in bed resting except for eating dinner. Patient states she is more tired than depressed. Patient complained of some constipation and had not had a bowel movement in 2 days. Miralax given. Patient complained of a headache and had ibuprofen which helped to decrease the pain.

## 2019-12-13 NOTE — PLAN OF CARE
When I checked in with pt she stated that she did not feel safe and had SI with a plan to head bang on the window. When I pressed further with questions she cried into her pillow. I encouraged her to leave her room and to join group which she did. I notified both her nurse and the charge nurse of pt's statements. Her affect with blunt and her mood is calm and depressed. She has been very quiet and only speaks when spoken to. Per other PA, during Escapio group she cried stating that her father is very ill and that she mentioned ending her life when he dies as well. After Escapio group she did attend wrap up group and went to bed.

## 2019-12-13 NOTE — PROGRESS NOTES
12/12/19 1937   Behavioral Health   Hallucinations denies / not responding to hallucinations   Thinking poor concentration   Orientation person: oriented   Memory baseline memory   Insight poor   Judgement impaired   Eye Contact at examiner   Affect blunted, flat;sad   Mood depressed;mood is calm   Physical Appearance/Attire disheveled   Hygiene   (fair)   Suicidality thoughts and plan   1. Wish to be Dead (Recent) Yes   2. Non-Specific Active Suicidal Thoughts (Recent) No   Self Injury   (plan to bang her head on the window.)   Elopement   (none stated. none observed.)   Activity withdrawn;isolative   Speech coherent;clear   Medication Sensitivity no observed side effects;no stated side effects   Psychomotor / Gait steady;balanced   Psycho Education   Type of Intervention 1:1 intervention   Response participates with encouragement   Treatment Detail   (1:1 check in)   Activities of Daily Living   Hygiene/Grooming independent   Oral Hygiene independent   Dress scrubs (behavioral health)   Room Organization independent     When I checked in with pt she stated that she did not feel safe and had SI with a plan to head bang on the window. When I pressed further with questions she cried into her pillow. I encouraged her to leave her room and to join group which she did. I notified both her nurse and the charge nurse of pt's statements. Her affect with blunt and her mood is calm and depressed. She has been very quiet and only speaks when spoken to. Per other PA, during Alleantia group she cried stating that her father is very ill and that she mentioned ending her life when he dies as well. After Alleantia group she did attend wrap up group and went to bed.

## 2019-12-14 PROCEDURE — 25000132 ZZH RX MED GY IP 250 OP 250 PS 637: Performed by: PSYCHIATRY & NEUROLOGY

## 2019-12-14 PROCEDURE — 12400000 ZZH R&B MH

## 2019-12-14 RX ORDER — QUETIAPINE FUMARATE 25 MG/1
25 TABLET, FILM COATED ORAL 4 TIMES DAILY PRN
Status: DISCONTINUED | OUTPATIENT
Start: 2019-12-14 | End: 2019-12-23 | Stop reason: HOSPADM

## 2019-12-14 RX ORDER — ARIPIPRAZOLE 10 MG/1
10 TABLET ORAL EVERY MORNING
Status: DISCONTINUED | OUTPATIENT
Start: 2019-12-14 | End: 2019-12-23 | Stop reason: HOSPADM

## 2019-12-14 RX ADMIN — VENLAFAXINE HYDROCHLORIDE 300 MG: 150 CAPSULE, EXTENDED RELEASE ORAL at 08:15

## 2019-12-14 RX ADMIN — NALTREXONE HYDROCHLORIDE 50 MG: 50 TABLET, FILM COATED ORAL at 08:15

## 2019-12-14 RX ADMIN — HYDROXYZINE HYDROCHLORIDE 50 MG: 25 TABLET, FILM COATED ORAL at 08:15

## 2019-12-14 RX ADMIN — QUETIAPINE FUMARATE 400 MG: 200 TABLET, EXTENDED RELEASE ORAL at 20:35

## 2019-12-14 RX ADMIN — ARIPIPRAZOLE 10 MG: 10 TABLET ORAL at 08:15

## 2019-12-14 ASSESSMENT — ACTIVITIES OF DAILY LIVING (ADL)
HYGIENE/GROOMING: PROMPTS
ORAL_HYGIENE: PROMPTS
DRESS: PROMPTS

## 2019-12-14 NOTE — PLAN OF CARE
Shift Update: Pt mood is labile. Thought process is impaired. Insight is impaired. Pt has suicidal ideation and sib. Recent Med changes Abilify increased to 10 mg and Seroquel 25 mg prn.   Pt has depressed affect and states she feels like hurting herself but when asked what she would use she could not answer and would not contract for safety. IT was reported from night shift that during room checks yesterday a plastic knife was found under her bed and she had thoughts of cutting herself.We asked a NA to watch her with her silverware and she was rolling her silverware up in her napkin. Staff felt it would be better to move pt back to [facility] for closer monitoring. Pt was opposed to this but understood it was for her own safety and that when she is feeling better she can go to groups.

## 2019-12-14 NOTE — PLAN OF CARE
[id number]: Pt  was cooperative and med complaint. Pt denies any SI. Pt spent most of her time in her room resting in bed and has been isolative to her room. Pt denies any current issues has flat affect. Staff conducted room search  and found a knife under pt's bed.

## 2019-12-15 PROCEDURE — 25000132 ZZH RX MED GY IP 250 OP 250 PS 637: Performed by: PSYCHIATRY & NEUROLOGY

## 2019-12-15 PROCEDURE — 12400000 ZZH R&B MH

## 2019-12-15 RX ADMIN — NICOTINE POLACRILEX 2 MG: 2 GUM, CHEWING ORAL at 18:17

## 2019-12-15 RX ADMIN — VENLAFAXINE HYDROCHLORIDE 300 MG: 150 CAPSULE, EXTENDED RELEASE ORAL at 07:45

## 2019-12-15 RX ADMIN — QUETIAPINE FUMARATE 400 MG: 200 TABLET, EXTENDED RELEASE ORAL at 21:15

## 2019-12-15 RX ADMIN — ARIPIPRAZOLE 10 MG: 10 TABLET ORAL at 07:45

## 2019-12-15 RX ADMIN — NALTREXONE HYDROCHLORIDE 50 MG: 50 TABLET, FILM COATED ORAL at 07:45

## 2019-12-15 NOTE — PLAN OF CARE
"  Problem: Suicidal Behavior  Goal: Suicidal Behavior is Absent or Managed  12/14/2019 2203 by Jeaneth Mccarty RN  Flowsheets (Taken 12/14/2019 2203)  Mutually Determined Action Steps (Facilitate Resolution of Suicidal Intent): identifies protective factors; sets future-oriented goal; identifies crisis plan  Mutually Determined Action Steps (Provide Immediate/Ongoing Protective Physical Environment): verbalizes safety check rationale; identifies home safety strategy; shares suicidal thoughts  Note:   Pt spent majority of the evening in step down. Pt was observed socializing with peers; pt attended all evening group activities. Stated feeling less thoughts of suicide today. When asked about suicidal plans, pt replied, \"I don't know what my plans are but they are less than what they were before.\" Spoke with pt about focusing on her own actions and not allowing others to influence her in a negative way to the point of wanting to hurt them and or herself. Pt had a nose bleed after supper. States that it gets worse during the winter months. Nasal spray was ordered. Pt will continue to be monitored for safety.      "

## 2019-12-15 NOTE — PLAN OF CARE
Problem: Adult Behavioral Health Plan of Care  Goal: Patient-Specific Goal (Individualization)  Description  1. Mood stability   2. Absence of suicidal ideation/contracts for safety   3. Depressive s/sx resolved  4. Safety plan in place  5. Positive coping skills identified/utilized  6. Medication regiment established/compliance  7. Adequate sleep  8. Housing community support  9. F/u plan in place   Outcome: No Change  Note:   Pt has been [present on the SDU and has been social and interacting with peers. Pt has been able to remains safe and contracts for safety. Pt states she is going to come to staff if she feels like she wants to hurt herself.Pt has also been following up with bringing her silverware to staff. Pt denies any current SI. Pt is flat and depressed. Pt does state she feels concerned about another pt and is worried that this pt would hurt her. Pt admitted to feeling scared of this pt. Pt is encouraged to spend time on the SDU if she feels more comfortable. Nursing to monitor.

## 2019-12-15 NOTE — PROGRESS NOTES
"[facility] Psychiatric Progress Note       Interim History     The patient's care was discussed with the treatment team and chart notes were reviewed. Per psych associate and nursing staff notes from yesterday evening, the patient expressed having suicidal ideation with a plan to bang her head on the window. She also noted that her father is seriously ill and that when he passes, she may end her life as well. Pt seen on 12/13/19 by Dr. Llanes. On interview, the patient declares she is doing alright this morning. She reports sleeping ok last night with no major complications. Patient also denies experiencing any side effects from the new medication, Abilify, that was started yesterday afternoon. Dr. Llanes would like to increase this medication dose from 2mg to 5mg today. Patient in agreement with this medication change. Aside from this, Dr. Llanes will be in contact with the patients parents later this morning to collect further collateral history.      Dr. Llanes contacted the patients father this morning to obtain further collateral history. Patients father reports, \"she [the patient] keeps switching her story on me...she gets attached to people way too quick...she lost her baby sister in 2011 and she took that really hard because her baby sister was only 24 minutes old when she passed away in the hospital. Ever since that, she has been getting into arguments with people...when we get into arguments, she will say she wishes I were dead and that she were dead...if kael doesn't hear what she wants to hear, she'll start to flip out and start to do stupid stuff...in her state of mind, she is not doing anything wrong, but everyone else is doing wrong. 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 Llanes explains why multi personality disorder is not the correct diagnosis for patient, rather the " "patient lines up with the diagnosis of Borderline Personality Disorder. This diagnosis is explained in significant detail. After much conversation, the patients father is in agreement. He states, \"I think she needs to be institutionalized for a certain amount of months to become more stable.\" Dr. Llanes does not agree the patient needs to be institutionalized and instead reviews the different aftercare options. Dr. Llanes believes DBT would be the patients best and most beneficial option. Patients father in agreement with this, but states, \"she'll go for a week then won't go anymore. That's why I think she needs to be institutionalized. Is there a way to commit her?\" Dr. Llanes does not deem the patient committable at this time. Will discuss DBT with patient during next meeting.        Hospital Course   This is a 19 year old single female with previous psychiatric diagnoses of major depression, anxiety, ADHD, intellectual disability, and borderline personality disorder. She has a history of multiple inpatient mental health hospitalizations, dating back to when the patient was 12 years old. Her most recent admission being from 11/19/19-11/29/19. Patient presented to [facility] ED on 12/11/19 for mental health evaluation. Prior to admission, the patient had been having difficulties at school where other students were bullying her. The bullying worsened and eventually led the patient to make suicidal comments such as that she wanted to die and that she was done with life. Patient had an active plan of using a knife to cut her throat. She was deemed appropriate for inpatient level of care for further stabilization and medication management. While meeting with Dr. Llanes this afternoon, the patient presented depressed in mood and flat in affect. Her previous and current psychiatric medications were heavily considered. Dr. Llanes reviewed the medication Abilify with the patient, such as its possible side " "effects and many benefits. This medication will particular augment the patients anti-depressant. Patient provided verbal consent, thus Ability 2mg daily was ordered.     Medications     Current Facility-Administered Medications Ordered in Epic   Medication Dose Route Frequency Last Rate Last Dose     ARIPiprazole (ABILIFY) tablet 10 mg  10 mg Oral QAM   10 mg at 12/15/19 0745     hydrOXYzine (ATARAX) tablet 25-50 mg  25-50 mg Oral TID PRN   50 mg at 12/14/19 0815     ibuprofen (ADVIL/MOTRIN) tablet 600 mg  600 mg Oral Q6H PRN   600 mg at 12/13/19 1400     naltrexone (DEPADE/REVIA) tablet 50 mg  50 mg Oral Daily   50 mg at 12/15/19 0745     nicotine (NICORETTE) gum 2-4 mg  2-4 mg Buccal Q1H PRN         polyethylene glycol (MIRALAX/GLYCOLAX) Packet 17 g  17 g Oral Daily PRN   17 g at 12/13/19 1356     QUEtiapine (SEROquel) tablet 25 mg  25 mg Oral 4x Daily PRN         QUEtiapine ER (SEROquel XR) 24 hr tablet 400 mg  400 mg Oral At Bedtime   400 mg at 12/14/19 2035     senna-docusate (SENOKOT-S/PERICOLACE) 8.6-50 MG per tablet 1-2 tablet  1-2 tablet Oral BID PRN         sodium chloride (OCEAN) 0.65 % nasal spray 1 spray  1 spray Nasal Q1H PRN         venlafaxine (EFFEXOR-XR) 24 hr capsule 300 mg  300 mg Oral Daily   300 mg at 12/15/19 0745     No current [facility]-ordered outpatient medications on file.         Allergies      Allergies   Allergen Reactions     Penicillins Rash        Medical Review of Systems     /80   Pulse 90   Temp 97.8  F (36.6  C) (Oral)   Resp 16   Ht 1.626 m (5' 4\")   Wt 92.2 kg (203 lb 3.2 oz)   LMP 12/10/2019   SpO2 99%   BMI 34.88 kg/m    Body mass index is 34.88 kg/m .  A 10-point review of systems was performed by Eduardo Llanes MD and is negative, no new findings.      Psychiatric Examination     Appearance Sitting in chair, dressed in hospital scrubs. Appears stated age.   Attitude Cooperative   Orientation Oriented to person, place, time   Eye Contact Fine   Speech Regular " rate, rhythm, volume and tone   Language Normal   Psychomotor Behavior Normal   Mood Depressed   Affect Flat   Thought Process Goal-Oriented, Intact   Associations Intact   Thought Content Patient is currently negative for suicidal ideation, negative for plan or intent, able to contract no self harm and identify barriers to suicide.  Negative for obsessions, compulsions or psychosis.     Fund of Knowledge Intact   Insight Fair   Judgement Fair    Attention Span & Concentration Fair   Recent & Remote Memory Intact   Gait Normal   Muscle Tone Intact        Labs     Labs reviewed.  No results found for this or any previous visit (from the past 24 hour(s)).     Impression     This is a 19 year old single female with previous psychiatric diagnoses of major depression, anxiety, ADHD, intellectual disability, and borderline personality disorder. Patient presented to [facility] ED on 12/11/19 for mental health evaluation. While meeting with Dr. Llanes this morning, the patient was again cooperative, flat in affect, and appeared depressed in mood. She denied experiencing any side effects from the new medication, Abilify, that was started yesterday afternoon. Dr. Llanes increased Abilify dose from 2mg to 5mg this morning. Patient in agreement with this medication adjustment. Dr. Llanes will be in contact with the patients parents later this morning or afternoon to obtain further collateral history and discuss aftercare plans.      Diagnoses     1. Major depression, recurrent, severe, without psychotic features.  2. Borderline Personality Disorder   3. Intellectual Disability     Plan     1. Explained side effects, benefits, and complications of medications to the patient, Pt gave verbal consent.  2. Medication changes: Increased Abilify dose to 5mg   3. Discussed treatment plan with patient and team.  4. Projected length of stay: 7+ days       Attestation:   Nimisha MCGINNIS, am serving as a scribe on 12/13/2019 to  document services personally performed by Eduardo Llanes MD based on my observations and the provider's statements to me.    Patient ID:  Name: Sadaf Ross    MRN: [id number]  Admission: 12/12/2019   YOB: 2000

## 2019-12-16 PROCEDURE — 25000132 ZZH RX MED GY IP 250 OP 250 PS 637: Performed by: PSYCHIATRY & NEUROLOGY

## 2019-12-16 PROCEDURE — G0177 OPPS/PHP; TRAIN & EDUC SERV: HCPCS

## 2019-12-16 PROCEDURE — 12400000 ZZH R&B MH

## 2019-12-16 RX ADMIN — ARIPIPRAZOLE 10 MG: 10 TABLET ORAL at 08:24

## 2019-12-16 RX ADMIN — NALTREXONE HYDROCHLORIDE 50 MG: 50 TABLET, FILM COATED ORAL at 08:24

## 2019-12-16 RX ADMIN — QUETIAPINE FUMARATE 400 MG: 200 TABLET, EXTENDED RELEASE ORAL at 21:48

## 2019-12-16 RX ADMIN — NICOTINE POLACRILEX 2 MG: 2 GUM, CHEWING ORAL at 14:24

## 2019-12-16 RX ADMIN — VENLAFAXINE HYDROCHLORIDE 300 MG: 150 CAPSULE, EXTENDED RELEASE ORAL at 08:24

## 2019-12-16 RX ADMIN — NICOTINE POLACRILEX 2 MG: 2 GUM, CHEWING ORAL at 18:16

## 2019-12-16 ASSESSMENT — ACTIVITIES OF DAILY LIVING (ADL)
ORAL_HYGIENE: INDEPENDENT
HYGIENE/GROOMING: INDEPENDENT
LAUNDRY: WITH SUPERVISION
DRESS: INDEPENDENT

## 2019-12-16 NOTE — PROGRESS NOTES
"[facility] Psychiatric Progress Note       Interim History     The patient's care was discussed with the treatment team and chart notes were reviewed. Over the weekend, the patient was moved to the [facility] after expressing on multiple occasions that she did not feel safe on the SDU. The patient reported suicidal ideation throughout the weekend. A plastic knife was also found under the patients bed as the patient had thoughts of cutting herself. She did engage in self-injurious behavior on 12/14 (biting herself during the night). Pt seen on 12/16/19 by Dr. Llanes. On interview, the patient declares she is doing ok this morning. Dr. Llanes asks if the patient had been biting and cutting herself over the weekend. The patient confirms this and states she partook in these behaviors \"to relieve pain.\" When asked what her pain was from, the patient states, \"I don't know.\" Dr. Llanes informs the patient about DBT and why this option would be a good one for patient after this hospitalization. Patient willing to consider this aftercare plan. In addition to this, Dr. Llanes would like a family meeting to take place tomorrow at 9:00am. Will contact the patients family members today.      Hospital Course   This is a 19 year old single female with previous psychiatric diagnoses of major depression, anxiety, ADHD, intellectual disability, and borderline personality disorder. She has a history of multiple inpatient mental health hospitalizations, dating back to when the patient was 12 years old. Her most recent admission being from 11/19/19-11/29/19. Patient presented to [facility] ED on 12/11/19 for mental health evaluation. Prior to admission, the patient had been having difficulties at school where other students were bullying her. The bullying worsened and eventually led the patient to make suicidal comments such as that she wanted to die and that she was done with life. Patient had an active plan of " using a knife to cut her throat. She was deemed appropriate for inpatient level of care for further stabilization and medication management. While meeting with Dr. Llanes this afternoon, the patient presented depressed in mood and flat in affect. Her previous and current psychiatric medications were heavily considered. Dr. Llanes reviewed the medication Abilify with the patient, such as its possible side effects and many benefits. This medication will particular augment the patients anti-depressant. Patient provided verbal consent, thus Ability 2mg daily was ordered.     Medications     Current Facility-Administered Medications Ordered in Epic   Medication Dose Route Frequency Last Rate Last Dose     ARIPiprazole (ABILIFY) tablet 10 mg  10 mg Oral QAM   10 mg at 12/15/19 0745     hydrOXYzine (ATARAX) tablet 25-50 mg  25-50 mg Oral TID PRN   50 mg at 12/14/19 0815     ibuprofen (ADVIL/MOTRIN) tablet 600 mg  600 mg Oral Q6H PRN   600 mg at 12/13/19 1400     naltrexone (DEPADE/REVIA) tablet 50 mg  50 mg Oral Daily   50 mg at 12/15/19 0745     nicotine (NICORETTE) gum 2-4 mg  2-4 mg Buccal Q1H PRN   2 mg at 12/15/19 1817     polyethylene glycol (MIRALAX/GLYCOLAX) Packet 17 g  17 g Oral Daily PRN   17 g at 12/13/19 1356     QUEtiapine (SEROquel) tablet 25 mg  25 mg Oral 4x Daily PRN         QUEtiapine ER (SEROquel XR) 24 hr tablet 400 mg  400 mg Oral At Bedtime   400 mg at 12/15/19 2115     senna-docusate (SENOKOT-S/PERICOLACE) 8.6-50 MG per tablet 1-2 tablet  1-2 tablet Oral BID PRN         sodium chloride (OCEAN) 0.65 % nasal spray 1 spray  1 spray Nasal Q1H PRN         venlafaxine (EFFEXOR-XR) 24 hr capsule 300 mg  300 mg Oral Daily   300 mg at 12/15/19 0745     No current [facility]-ordered outpatient medications on file.         Allergies      Allergies   Allergen Reactions     Penicillins Rash        Medical Review of Systems     /81 (BP Location: Right arm)   Pulse 101   Temp 98.7  F (37.1  C) (Oral)    "Resp 18   Ht 1.626 m (5' 4\")   Wt 92.2 kg (203 lb 3.2 oz)   LMP 12/10/2019   SpO2 97%   BMI 34.88 kg/m    Body mass index is 34.88 kg/m .  A 10-point review of systems was performed by Eduardo Llanes MD and is negative, no new findings.      Psychiatric Examination     Appearance Sitting in chair, dressed in hospital scrubs. Appears stated age.   Attitude Cooperative   Orientation Oriented to person, place, time   Eye Contact Fine   Speech Regular rate, rhythm, volume and tone   Language Normal   Psychomotor Behavior Normal   Mood Depressed   Affect Flat   Thought Process Goal-Oriented, Intact   Associations Intact   Thought Content Patient is currently negative for suicidal ideation, negative for plan or intent, able to contract no self harm and identify barriers to suicide.  Negative for obsessions, compulsions or psychosis.     Fund of Knowledge Intact   Insight Poor    Judgement Poor    Attention Span & Concentration Fair   Recent & Remote Memory Intact   Gait Normal   Muscle Tone Intact        Labs     Labs reviewed.  No results found for this or any previous visit (from the past 24 hour(s)).     Impression     This is a 19 year old single female with previous psychiatric diagnoses of major depression, anxiety, ADHD, intellectual disability, and borderline personality disorder. Patient presented to [facility] ED on 12/11/19 for mental health evaluation. While meeting with Dr. Llanes this morning, the patient denied experiencing any issues or complications with her current medications or the overall care provided by [facility]. The patient confirmed performing self-injurious behavior such as cutting and biting herself over the weekend to \"relieve pain.\" Dr. Llanes informed the patient about DBT and highly encouraged the patient to consider this aftercare plan. Patient willing to attend this form of therapy after hospital stay. There were no medication adjustments made today. Dr. Llanes would like " a family meeting to take place tomorrow at 9:00am.      Diagnoses     1. Major depression, recurrent, severe, without psychotic features.  2. Borderline Personality Disorder   3. Intellectual Disability     Plan     1. Explained side effects, benefits, and complications of medications to the patient, Pt gave verbal consent.  2. Medication changes: None.   3. Discussed treatment plan with patient and team.  4. Projected length of stay: 7+ days   5. Make DBT intake   6. Family meeting tomorrow at 9:00am      Attestation:   I, Nimisha Zaragoza, am serving as a scribe on 12/16/2019 to document services personally performed by Eduardo Llanes MD based on my observations and the provider's statements to me.    Patient ID:  Name: Sadaf Ross    MRN: [id number]  Admission: 12/12/2019   YOB: 2000

## 2019-12-16 NOTE — PLAN OF CARE
"Shift Update: Pt states she is anxious and depressed, but she is feeling \"less panicky\" Pt's affect is flat, but pt brightens when visiting with peers and staff.. Insight is poor as is judgement. Pt  denies SI and SIB thoughts this shift. Pt admitted to writer that during the night, she became scared and stressed and did bite her hand. Pt stated that she did not break the skin and that she will come talk to staff if she feels SIB urges. Pt asked staff several times to be transferred to the SDU because she is \"scared\" and feels unsafe around another pt. Pt spent almost the entire shift on the SDU side visiting with peers and pacing the halls.  "

## 2019-12-16 NOTE — PLAN OF CARE
BEHAVIORAL TEAM DISCUSSION    Participants: MD, RN, CM, PA, OT  Progress: suicidal through weekend, thoughts of cutting self, attending groups  Anticipated length of stay: less than 7 days  Continued Stay Criteria/Rationale: family meeting, medication management, DBT referral  Medical/Physical: NA  Precautions:   Behavioral Orders   Procedures    Code 1 - Restrict to Unit    Routine Programming     As clinically indicated    Self Injury Precaution    Status 15     Every 15 minutes.    Suicide precautions     Patients on Suicide Precautions should have a Combination Diet ordered that includes a Diet selection(s) AND a Behavioral Tray selection for Safe Tray - with utensils, or Safe Tray - NO utensils       Plan: encourage groups, medication management, schedule family meeting  Rationale for change in precautions or plan: NA

## 2019-12-16 NOTE — PROGRESS NOTES
I spoke with patient's father, Vishnu [phone].  He stated that neither he or his wife can come in for a meeting tomorrow morning.  He stated his wife works until 3:30 AM and she needs to get their other daughter off to school at 8:00 AM so she is unable and he needs to take care of the dogs until he goes to work at 1:00 PM.

## 2019-12-16 NOTE — PROGRESS NOTES
12/16/19 1500   General Information   Date Initially Attended OT 12/16/19     Pt attended 1 of 2 OT groups today. Pt transitioned to PM mindfulness group with MIN encouragement and participated in a mindfulness group focusing on reduction of anxiety, improvement of mood, and increase in concentration. The mindfulness practice was offered via supportive approaches including aromatherapy and seated body scan. Pt actively contributed to group discussion, however, was tangential at times, requiring redirection. More observation needed to complete initial evaluation at this time. Pt will continue to benefit from OT intervention to address implementation of positive functional coping skills, role performance, and community reintegration.

## 2019-12-16 NOTE — PLAN OF CARE
Pt is calm but appears depressed. Affect is flat that brightens upon approach. Pt has poor insight and impaired judgement. Pt spent most of the shift in the step down unit and and napping in her room. Pt attended groups and was med compliant. Pt denies any SI nor hallucinations. Pt stated that she slept much better last night. Continues to verbalized about being scared about IT. Would like to be transferred to SDU with an opening.

## 2019-12-17 PROCEDURE — 25000132 ZZH RX MED GY IP 250 OP 250 PS 637: Performed by: PSYCHIATRY & NEUROLOGY

## 2019-12-17 PROCEDURE — 12400000 ZZH R&B MH

## 2019-12-17 RX ADMIN — NICOTINE POLACRILEX 2 MG: 2 GUM, CHEWING ORAL at 09:13

## 2019-12-17 RX ADMIN — NALTREXONE HYDROCHLORIDE 50 MG: 50 TABLET, FILM COATED ORAL at 09:12

## 2019-12-17 RX ADMIN — VENLAFAXINE HYDROCHLORIDE 300 MG: 150 CAPSULE, EXTENDED RELEASE ORAL at 09:12

## 2019-12-17 RX ADMIN — ARIPIPRAZOLE 10 MG: 10 TABLET ORAL at 09:12

## 2019-12-17 RX ADMIN — QUETIAPINE FUMARATE 400 MG: 200 TABLET, EXTENDED RELEASE ORAL at 20:36

## 2019-12-17 ASSESSMENT — MIFFLIN-ST. JEOR: SCORE: 1679.43

## 2019-12-17 ASSESSMENT — ACTIVITIES OF DAILY LIVING (ADL)
DRESS: INDEPENDENT
LAUNDRY: WITH SUPERVISION
DRESS: STREET CLOTHES
ORAL_HYGIENE: INDEPENDENT
HYGIENE/GROOMING: INDEPENDENT
HYGIENE/GROOMING: INDEPENDENT
ORAL_HYGIENE: INDEPENDENT

## 2019-12-17 NOTE — PLAN OF CARE
Calm in mood. Social with peers. Moved to the SDU this evening. Denies current self injurious urges.

## 2019-12-17 NOTE — PROGRESS NOTES
12/16/19 [facility]   Art Therapy   Type of Intervention structured groups   Response Participated with encouragement/ redirections   Hours 1   Treatment Detail    (Art Therapy - holiday cards/ art)- grief work   Goal- cope, express, regulate and reflect through Art Therapy direct.     Outcome- Pt was in group for most of the group hour. She was tearful both in Art Therpay and wrap up group about the loss of her sister when she was 11 years old. Mom lost an infant girl at birth. She saw some scrapbook paper with red roses and it set her into thinking about sister a lot. That led to thinking about grandfather's passing and grief and father being ill currently, with multiple medical complications. She said he has been diagnosed as very ill since 2004 and she is scared of when he will die. Writer did some grief work with her. She wrote a letter to her baby sister. She also spoke about dad and writer actively listened and focused more on that he had survived so long with these illnesses, he is still here and talking about positive memories and creating more in the present , which helped her. In wrap up meeting when her roommate apologized that she wasn't there for her when she was tearful as the roommate had lost her grandmother and hadn't been able to cry. She said seeing Sadaf cry was hard. When the roommate said that , Sadaf started crying again. Writer kept a positive focus complimenting her on the grief work she did in grou and the way she effectively managed and moved through her intense feelings ie DBT skills of emotional regulation were used with her.

## 2019-12-17 NOTE — PLAN OF CARE
Problem: Suicidal Behavior  Goal: Suicidal Behavior is Absent or Managed  Outcome: Improving  Note:   Pt presents with a blunted affect and depressed mood. Pt states that she is feeling better today and feels comfortable in the environment on the unit. Her insight is poor and judgement is impaired. Pt's thought processes are organized. She is cooperative with staff and attended daytime groups, but exhibits poor boundaries with her peers - staff was notified. Pt denies current SI and is med-compliant.

## 2019-12-17 NOTE — PROGRESS NOTES
"[facility] Psychiatric Progress Note       Interim History     The patient's care was discussed with the treatment team and chart notes were reviewed. Pt seen on 12/16/19 by Dr. Llanes. Patient states she \"could be better\" today. She elaborates more on this. Patient reports she is still having suicidal thoughts, but is able to recognize barriers. These barriers include staff members and other patients. Patient denies experiencing any side effects from her current medication regimen. Dr. Llanes will not make any medication changes today, will continue patient on Abilify 10mg, Effexor 300mg, and Seroquel 400mg. Aside from this and in regards of aftercare plans, Dr. Llanes continues to believe that the patient would benefit from DBT. Patient proceeds to be in agreement with this plan.      Hospital Course   This is a 19 year old single female with previous psychiatric diagnoses of major depression, anxiety, ADHD, intellectual disability, and borderline personality disorder. She has a history of multiple inpatient mental health hospitalizations, dating back to when the patient was 12 years old. Her most recent admission being from 11/19/19-11/29/19. Patient presented to [facility] ED on 12/11/19 for mental health evaluation. Prior to admission, the patient had been having difficulties at school where other students were bullying her. The bullying worsened and eventually led the patient to make suicidal comments such as that she wanted to die and that she was done with life. Patient had an active plan of using a knife to cut her throat. She was deemed appropriate for inpatient level of care for further stabilization and medication management. While meeting with Dr. Llanes this afternoon, the patient presented depressed in mood and flat in affect. Her previous and current psychiatric medications were heavily considered. Dr. Llanes reviewed the medication Abilify with the patient, such as its " "possible side effects and many benefits. This medication will particular augment the patients anti-depressant. Patient provided verbal consent, thus Ability 2mg daily was ordered. Patient tolerated this medication well, thus it was eventually increased to 10mg.      Medications     Current Facility-Administered Medications Ordered in Epic   Medication Dose Route Frequency Last Rate Last Dose     ARIPiprazole (ABILIFY) tablet 10 mg  10 mg Oral QAM   10 mg at 12/17/19 0912     hydrOXYzine (ATARAX) tablet 25-50 mg  25-50 mg Oral TID PRN   50 mg at 12/14/19 0815     ibuprofen (ADVIL/MOTRIN) tablet 600 mg  600 mg Oral Q6H PRN   600 mg at 12/13/19 1400     naltrexone (DEPADE/REVIA) tablet 50 mg  50 mg Oral Daily   50 mg at 12/17/19 0912     nicotine (NICORETTE) gum 2-4 mg  2-4 mg Buccal Q1H PRN   2 mg at 12/17/19 0913     polyethylene glycol (MIRALAX/GLYCOLAX) Packet 17 g  17 g Oral Daily PRN   17 g at 12/13/19 1356     QUEtiapine (SEROquel) tablet 25 mg  25 mg Oral 4x Daily PRN         QUEtiapine ER (SEROquel XR) 24 hr tablet 400 mg  400 mg Oral At Bedtime   400 mg at 12/16/19 2148     senna-docusate (SENOKOT-S/PERICOLACE) 8.6-50 MG per tablet 1-2 tablet  1-2 tablet Oral BID PRN         sodium chloride (OCEAN) 0.65 % nasal spray 1 spray  1 spray Nasal Q1H PRN         venlafaxine (EFFEXOR-XR) 24 hr capsule 300 mg  300 mg Oral Daily   300 mg at 12/17/19 0912     No current [facility]-ordered outpatient medications on file.         Allergies      Allergies   Allergen Reactions     Penicillins Rash        Medical Review of Systems     /50   Pulse 75   Temp 98.7  F (37.1  C) (Oral)   Resp 15   Ht 1.626 m (5' 4\")   Wt 92.4 kg (203 lb 12.8 oz)   LMP 12/10/2019   SpO2 100%   BMI 34.98 kg/m    Body mass index is 34.98 kg/m .  A 10-point review of systems was performed by Eduardo Llanes MD and is negative, no new findings.      Psychiatric Examination     Appearance Sitting in chair, dressed in hospital scrubs. Appears " stated age.   Attitude Cooperative   Orientation Oriented to person, place, time   Eye Contact Fine   Speech Regular rate, rhythm, volume and tone   Language Normal   Psychomotor Behavior Normal   Mood Depressed   Affect Flat   Thought Process Goal-Oriented, Intact   Associations Intact   Thought Content Patient is currently positive for suicidal ideation, negative for plan or intent, able to contract no self harm and identify barriers to suicide.  Negative for obsessions, compulsions or psychosis.     Fund of Knowledge Intact   Insight Poor    Judgement Poor    Attention Span & Concentration Fair   Recent & Remote Memory Intact   Gait Normal   Muscle Tone Intact        Labs     Labs reviewed.  No results found for this or any previous visit (from the past 24 hour(s)).     Impression     This is a 19 year old single female with previous psychiatric diagnoses of major depression, anxiety, ADHD, intellectual disability, and borderline personality disorder. Patient presented to [facility] ED on 12/11/19 for mental health evaluation. The patient continued to express suicidal thoughts and ideations while meeting with Dr. Llanes this morning. She is able to identify barriers to suicide. These include staff members and other patients. Aside from this, the patient denied experiencing any side effects or issues with her current medications. There were no medication changes today, will continue patient on Abilify 10mg, Seroquel 400mg, and Effexor 300mg. In regards of aftercare plans, Dr. Llanes believes the patient would benefit most from DBT. Will have our  arrange an intake appointment for DBT around the [place] area. Patient in agreement with this plan.      Diagnoses     1. Major depression, recurrent, severe, without psychotic features.  2. Borderline Personality Disorder   3. Intellectual Disability     Plan     1. Explained side effects, benefits, and complications of medications to the patient, Pt  gave verbal consent.  2. Medication changes: None.   3. Discussed treatment plan with patient and team.  4. Projected length of stay: 7+ days   5. Schedule DBT intake   6. Consider IRTS placement      Attestation:   I, Nimisha Zaragoza, am serving as a scribe on 12/17/2019 to document services personally performed by Eduardo Llanes MD based on my observations and the provider's statements to me.    Patient ID:  Name: Sadaf Ross    MRN: [id number]  Admission: 12/12/2019   YOB: 2000

## 2019-12-18 PROCEDURE — 12400000 ZZH R&B MH

## 2019-12-18 PROCEDURE — 25000132 ZZH RX MED GY IP 250 OP 250 PS 637: Performed by: PSYCHIATRY & NEUROLOGY

## 2019-12-18 PROCEDURE — G0177 OPPS/PHP; TRAIN & EDUC SERV: HCPCS

## 2019-12-18 RX ORDER — CALCIUM CARBONATE 500 MG/1
1000 TABLET, CHEWABLE ORAL EVERY 4 HOURS PRN
Status: DISCONTINUED | OUTPATIENT
Start: 2019-12-18 | End: 2019-12-23 | Stop reason: HOSPADM

## 2019-12-18 RX ADMIN — CALCIUM CARBONATE (ANTACID) CHEW TAB 500 MG 1000 MG: 500 CHEW TAB at 18:34

## 2019-12-18 RX ADMIN — IBUPROFEN 600 MG: 600 TABLET ORAL at 20:25

## 2019-12-18 RX ADMIN — NALTREXONE HYDROCHLORIDE 50 MG: 50 TABLET, FILM COATED ORAL at 09:04

## 2019-12-18 RX ADMIN — QUETIAPINE FUMARATE 400 MG: 200 TABLET, EXTENDED RELEASE ORAL at 20:25

## 2019-12-18 RX ADMIN — ARIPIPRAZOLE 10 MG: 10 TABLET ORAL at 09:04

## 2019-12-18 RX ADMIN — VENLAFAXINE HYDROCHLORIDE 300 MG: 150 CAPSULE, EXTENDED RELEASE ORAL at 09:04

## 2019-12-18 ASSESSMENT — ACTIVITIES OF DAILY LIVING (ADL)
LAUNDRY: WITH SUPERVISION
HYGIENE/GROOMING: INDEPENDENT
ORAL_HYGIENE: INDEPENDENT
DRESS: STREET CLOTHES
HYGIENE/GROOMING: INDEPENDENT
LAUNDRY: WITH SUPERVISION
HYGIENE/GROOMING: INDEPENDENT
DRESS: STREET CLOTHES
DRESS: INDEPENDENT
ORAL_HYGIENE: INDEPENDENT
ORAL_HYGIENE: INDEPENDENT

## 2019-12-18 NOTE — PLAN OF CARE
"Patient visible on the unit, minimally social with peers. Denies SI and SIB but has \"Please let me die\" written in ink on the palm of her hand. When asked about this she then states that she \"just has these thoughts sometimes\". Is able to contract for safety. Mood is anxious and sad. Affect is flat    "

## 2019-12-18 NOTE — PROGRESS NOTES
[facility] Psychiatric Progress Note       Interim History     The patient's care was discussed with the treatment team and chart notes were reviewed. Pt seen on 12/18/19 by Dr. Llanes. Patient reports she is doing alright this morning. She denies having any problems or experiencing any side effects from her current medications. Dr. Llanes does not wish to make any medication changes today, will continue with regimen. Patients aftercare plans are again discussed. Dr. Llanes continues to believe that patient should attend DBT immediately after hospitalization. Patient still in agreement with this idea. It should be noted that Dr. Llanes has attempted to be in contact with patients family members on multiple occasions to discuss where the patient will be discharging to, however family has not responded. Will attempt to contact family again today.      Hospital Course   This is a 19 year old single female with previous psychiatric diagnoses of major depression, anxiety, ADHD, intellectual disability, and borderline personality disorder. She has a history of multiple inpatient mental health hospitalizations, dating back to when the patient was 12 years old. Her most recent admission being from 11/19/19-11/29/19. Patient presented to [facility] ED on 12/11/19 for mental health evaluation. Prior to admission, the patient had been having difficulties at school where other students were bullying her. The bullying worsened and eventually led the patient to make suicidal comments such as that she wanted to die and that she was done with life. Patient had an active plan of using a knife to cut her throat. She was deemed appropriate for inpatient level of care for further stabilization and medication management. While meeting with Dr. Llanes this afternoon, the patient presented depressed in mood and flat in affect. Her previous and current psychiatric medications were heavily considered.   "Shraddha reviewed the medication Abilify with the patient, such as its possible side effects and many benefits. This medication will particular augment the patients anti-depressant. Patient provided verbal consent, thus Ability 2mg daily was ordered. Patient tolerated this medication well, thus it was eventually increased to 10mg. Dr. Llanes believed the patient would benefit most from DBT. Patient in agreement with this plan.       Medications     Current Facility-Administered Medications Ordered in Epic   Medication Dose Route Frequency Last Rate Last Dose     ARIPiprazole (ABILIFY) tablet 10 mg  10 mg Oral QAM   10 mg at 12/17/19 0912     hydrOXYzine (ATARAX) tablet 25-50 mg  25-50 mg Oral TID PRN   50 mg at 12/14/19 0815     ibuprofen (ADVIL/MOTRIN) tablet 600 mg  600 mg Oral Q6H PRN   600 mg at 12/13/19 1400     naltrexone (DEPADE/REVIA) tablet 50 mg  50 mg Oral Daily   50 mg at 12/17/19 0912     nicotine (NICORETTE) gum 2-4 mg  2-4 mg Buccal Q1H PRN   2 mg at 12/17/19 0913     polyethylene glycol (MIRALAX/GLYCOLAX) Packet 17 g  17 g Oral Daily PRN   17 g at 12/13/19 1356     QUEtiapine (SEROquel) tablet 25 mg  25 mg Oral 4x Daily PRN         QUEtiapine ER (SEROquel XR) 24 hr tablet 400 mg  400 mg Oral At Bedtime   400 mg at 12/17/19 2036     senna-docusate (SENOKOT-S/PERICOLACE) 8.6-50 MG per tablet 1-2 tablet  1-2 tablet Oral BID PRN         sodium chloride (OCEAN) 0.65 % nasal spray 1 spray  1 spray Nasal Q1H PRN         venlafaxine (EFFEXOR-XR) 24 hr capsule 300 mg  300 mg Oral Daily   300 mg at 12/17/19 0912     No current [facility]-ordered outpatient medications on file.         Allergies      Allergies   Allergen Reactions     Penicillins Rash        Medical Review of Systems     /63   Pulse 76   Temp 98.6  F (37  C) (Oral)   Resp 16   Ht 1.626 m (5' 4\")   Wt 92.4 kg (203 lb 12.8 oz)   LMP 12/10/2019   SpO2 99%   BMI 34.98 kg/m    Body mass index is 34.98 kg/m .  A 10-point review of " systems was performed by Eduardo Llanes MD and is negative, no new findings.      Psychiatric Examination     Appearance Sitting in chair, dressed in hospital scrubs. Appears stated age.   Attitude Cooperative   Orientation Oriented to person, place, time   Eye Contact Fine   Speech Regular rate, rhythm, volume and tone   Language Normal   Psychomotor Behavior Normal   Mood Depressed   Affect Flat   Thought Process Goal-Oriented, Intact   Associations Intact   Thought Content Patient is currently positive for suicidal ideation, negative for plan or intent, able to contract no self harm and identify barriers to suicide.  Negative for obsessions, compulsions or psychosis.     Fund of Knowledge Intact   Insight Poor    Judgement Poor    Attention Span & Concentration Fair   Recent & Remote Memory Intact   Gait Normal   Muscle Tone Intact        Labs     Labs reviewed.  No results found for this or any previous visit (from the past 24 hour(s)).     Impression     This is a 19 year old single female with previous psychiatric diagnoses of major depression, anxiety, ADHD, intellectual disability, and borderline personality disorder. Patient presented to [facility] ED on 12/11/19 for mental health evaluation. While meeting with Dr. Llanes this morning, the patient proceeded to present depressed in mood and flat in affect. She denied having any problems with her current medications and denied experiencing any side effects. There were no medication adjustments made today, will continue patient on current regimen. Dr. Llanes will attempt to be in contact with the patients family members once again to discuss aftercare plans.      Diagnoses     1. Major depression, recurrent, severe, without psychotic features.  2. Borderline Personality Disorder   3. Intellectual Disability     Plan     1. Explained side effects, benefits, and complications of medications to the patient, Pt gave verbal consent.  2. Medication changes:  None.   3. Discussed treatment plan with patient and team.  4. Projected length of stay: 7+ days   5. Schedule DBT intake   6. Consider IRTS placement      Attestation:   I, Nimisha Zaragoza, am serving as a scribe on 12/18/2019 to document services personally performed by Eduardo Llanes MD based on my observations and the provider's statements to me.    Patient ID:  Name: Sadaf Ross    MRN: [id number]  Admission: 12/12/2019   YOB: 2000

## 2019-12-18 NOTE — PROGRESS NOTES
Pt c/o chest discomfort during physical assessment, stated it was 9/10, but appeared comfortable and at ease. LS clear, HR WDL. Initially endorsed mild SOB, then denied. Declined all offers of antacids and analgesics. Will continue to monitor.

## 2019-12-18 NOTE — DISCHARGE SUMMARY
"[facility], [place]   Psychiatric Discharge Summary      Sadaf Ross MRN# [id number]   Age: 19 year old YOB: 2000     Date of Admission:  11/19/2019  Date of Discharge:  11/29/19  Admitting Physician:  Debra Naegele, ANP   Discharge Physician:  Jl Connor MD         Summary/Hospital Course/Disposition:   Reason for Hospitalization: Sadaf Ross is a 19-year-old single  female presenting to the ED from [facility] where she had thoughts of harming a school peer.  The patient reports that she found a razor blade on the floor of her counselor.  The patient is reporting stressors of ex-boyfriend was admitted to [facility] on unit 32.  The patient did not want to be admitted to this hospital.  The patient became agitated with the emergency room doctor, threatened to punch him.  The patient was given Zyprexa.  She apologized after she took Zyprexa.  The patient reports that she continues to have thoughts of wanting to harm female peer at [employer].  The patient states that this peer saw her smoking cigarettes at the mall.  The patient reports she just wanted to talk to her about why she was smoking cigarettes, but the peer was not willing to listen to her.  The patient feels that the peer was being mean to her.  The patient stated, \"I'm going to kill her.\"  Police were called to [facility].  The peer was warned of the patient's homicidal ideation towards her.  The patient reports another stressor is her younger sister went on a vacation for 3 weeks in [place].  The patient states she wanted to go with her sister, but was unable to.  The patient did go to the emergency room on 11/18.  She was tearful at that time because her sister went on vacation, but left.  The patient felt the ED was too busy.  The patient had been softly biting her hand in the emergency room.  The patient reports this is \"one of my disabilities.\"  The patient " "has not broken the skin.  The patient did engage in some scratching.  Patient reports another stressor is her father has type 1 diabetes.  She is fearful that he is going to die.  The patient endorses intellectual disability.  IQ is 82.  She presents as lower.  Patient is often very childlike in her descriptions.  Patient is her own legal guardian.       Hospital Course:   Patient has continuous reported SI. Was continued on Effexor 150 mg and Seroquel 300 mg. Noticeably, patient functions in a regressed cognitive capacity. Discussed starting Naltrexone to help attenuate her recurrent compulsive urges to bite herself and/or cut herself. Patient exhibited poor coping skills and even had an episode of self harm with utensil recently. She was reactive to stress and the gulshan of discharge, and suddenly reported SI when her mood and thoughts were calm a moment ago. Discussed potential discharge however patient suddenly erupted in tears saying she doesn't want to see father die. Father was called, who mentions he has brittle Diabetes that is poorly controlled and is cognizant about his mortality, and feels that Sadaf should \"understand\" the uncertainty of it. He does not realize that Sadaf is a low functioning and needs concrete explanations. Parents are willing to take her back. Patient continued to be rather manipulative, with exhibiting playful behaviors with peers,but suddenly erupting into tears and depression and the \"threat\" of self harm if discharged. Patient is a chronic risk of self harm but no imminent risk. Was discharged back home with meds.          DIagnoses:   1.  Major depressive disorder, recurrent, severe without psychosis.   2.  Borderline personality traits.   3.  Intellectual disability, IQ estimated at 82.          Labs:   No results found for this or any previous visit (from the past 24 hour(s)).         Consults:   None            Discharge Medications:        Review of your medicines    "   START taking      Dose / Directions   naltrexone 50 MG tablet  Commonly known as:  DEPADE/REVIA  Used for:  Borderline personality disorder (H)      Dose:  50 mg  Take 1 tablet (50 mg) by mouth daily  Quantity:  30 tablet  Refills:  1     nicotine 7 MG/24HR 24 hr patch  Commonly known as:  NICODERM CQ  Used for:  Nicotine use disorder      Dose:  1 patch  Place 1 patch onto the skin daily  Quantity:  30 patch  Refills:  1        CONTINUE these medicines which may have CHANGED, or have new prescriptions. If we are uncertain of the size of tablets/capsules you have at home, strength may be listed as something that might have changed.      Dose / Directions   QUEtiapine  MG 24 hr tablet  Commonly known as:  SEROquel XR  This may have changed:      medication strength    how much to take  Used for:  Borderline personality disorder (H), Major depressive disorder, recurrent episode, in partial remission (H)      Dose:  400 mg  Take 1 tablet (400 mg) by mouth At Bedtime  Quantity:  30 tablet  Refills:  1        CONTINUE these medicines which have NOT CHANGED      Dose / Directions   hydrOXYzine 25 MG tablet  Commonly known as:  ATARAX  Used for:  Borderline personality disorder (H), Major depressive disorder, recurrent episode, in partial remission (H)      Dose:  25-50 mg  Take 1-2 tablets (25-50 mg) by mouth 3 times daily as needed for itching  Quantity:  60 tablet  Refills:  1     polyethylene glycol packet  Commonly known as:  MIRALAX/GLYCOLAX      Dose:  1 packet  Take 1 packet by mouth daily as needed for constipation  Refills:  0     venlafaxine 150 MG 24 hr capsule  Commonly known as:  EFFEXOR-XR  Used for:  Borderline personality disorder (H), Major depressive disorder, recurrent episode, in partial remission (H)      Dose:  300 mg  Take 2 capsules (300 mg) by mouth daily  Quantity:  60 capsule  Refills:  1     Vitamin D (Cholecalciferol) 25 MCG (1000 UT) Tabs      Dose:  1,000 Units  Take 1,000 Units by  mouth daily  Refills:  0           Where to get your medicines      These medications were sent to [facility] [place], [place] - [address]  [address] [address]    Phone:  [phone]     hydrOXYzine 25 MG tablet    naltrexone 50 MG tablet    nicotine 7 MG/24HR 24 hr patch    QUEtiapine  MG 24 hr tablet    venlafaxine 150 MG 24 hr capsule              Mental Status Examination:   Appearance: awake, alert and disheveled   Attitude:  guarded and uncooperative  Eye Contact:  fair  Mood:  depressed  Affect:  intensity is blunted  Speech:  normal prosody  Psychomotor Behavior:  no evidence of tardive dyskinesia, dystonia, or tics  Throught Process:  linear  Associations:  no loose associations  Thought Content: No current SI/HI/AH/VH.   Insight:  limited  Judgement:  limited  Oriented to:  time, person, and place  Attention Span and Concentration:  fair  Recent and Remote Memory:  fair         Discharge Plan:   No discharge procedures on file.    Health Care Follow-up Appointments:   Medication Management:   Date/Time: Monday, December 9th@4:00pm    Provider: Cathie Alves  Address: [address] [place] [address]  Phone:[phone]  Fax: [phone]      Individual Therapy:   Date/Time: Wednesday, December 11th@3:00pm  Provider: MARIANNE Guy  Address: [address] [place], [place] [id number]  Phone: [phone]     @R: Gaby Alcantar [phone]    Jl Connor MD   [facility]

## 2019-12-19 LAB — TROPONIN I SERPL-MCNC: <0.015 UG/L (ref 0–0.04)

## 2019-12-19 PROCEDURE — 25000132 ZZH RX MED GY IP 250 OP 250 PS 637: Performed by: NURSE PRACTITIONER

## 2019-12-19 PROCEDURE — 25000132 ZZH RX MED GY IP 250 OP 250 PS 637: Performed by: PSYCHIATRY & NEUROLOGY

## 2019-12-19 PROCEDURE — 99231 SBSQ HOSP IP/OBS SF/LOW 25: CPT | Performed by: NURSE PRACTITIONER

## 2019-12-19 PROCEDURE — 93010 ELECTROCARDIOGRAM REPORT: CPT | Performed by: INTERNAL MEDICINE

## 2019-12-19 PROCEDURE — 99207 ZZC CDG-MDM COMPONENT: MEETS MODERATE - UP CODED: CPT | Performed by: NURSE PRACTITIONER

## 2019-12-19 PROCEDURE — 12400000 ZZH R&B MH

## 2019-12-19 PROCEDURE — 84484 ASSAY OF TROPONIN QUANT: CPT | Performed by: NURSE PRACTITIONER

## 2019-12-19 PROCEDURE — 36415 COLL VENOUS BLD VENIPUNCTURE: CPT | Performed by: NURSE PRACTITIONER

## 2019-12-19 PROCEDURE — G0177 OPPS/PHP; TRAIN & EDUC SERV: HCPCS

## 2019-12-19 PROCEDURE — 93005 ELECTROCARDIOGRAM TRACING: CPT

## 2019-12-19 RX ORDER — PANTOPRAZOLE SODIUM 40 MG/1
40 TABLET, DELAYED RELEASE ORAL
Status: DISCONTINUED | OUTPATIENT
Start: 2019-12-20 | End: 2019-12-23 | Stop reason: HOSPADM

## 2019-12-19 RX ORDER — ACETAMINOPHEN 500 MG
1000 TABLET ORAL EVERY 6 HOURS PRN
Status: DISCONTINUED | OUTPATIENT
Start: 2019-12-19 | End: 2019-12-23 | Stop reason: HOSPADM

## 2019-12-19 RX ADMIN — HYDROXYZINE HYDROCHLORIDE 50 MG: 25 TABLET, FILM COATED ORAL at 12:33

## 2019-12-19 RX ADMIN — ARIPIPRAZOLE 10 MG: 10 TABLET ORAL at 09:13

## 2019-12-19 RX ADMIN — NALTREXONE HYDROCHLORIDE 50 MG: 50 TABLET, FILM COATED ORAL at 09:13

## 2019-12-19 RX ADMIN — QUETIAPINE FUMARATE 400 MG: 200 TABLET, EXTENDED RELEASE ORAL at 21:03

## 2019-12-19 RX ADMIN — ACETAMINOPHEN 1000 MG: 500 TABLET, FILM COATED ORAL at 12:33

## 2019-12-19 RX ADMIN — VENLAFAXINE HYDROCHLORIDE 300 MG: 150 CAPSULE, EXTENDED RELEASE ORAL at 09:13

## 2019-12-19 RX ADMIN — CALCIUM CARBONATE (ANTACID) CHEW TAB 500 MG 1000 MG: 500 CHEW TAB at 12:34

## 2019-12-19 ASSESSMENT — ACTIVITIES OF DAILY LIVING (ADL)
HYGIENE/GROOMING: INDEPENDENT
DRESS: INDEPENDENT
ORAL_HYGIENE: INDEPENDENT
ORAL_HYGIENE: INDEPENDENT
DRESS: INDEPENDENT
HYGIENE/GROOMING: INDEPENDENT

## 2019-12-19 NOTE — PROGRESS NOTES
Brief Hospitalist Note:  Patient now endorses 9/10- to 10/10 chest pain since admission on 12/12/2019. She did not disclose this on ROS as reviewed by RINA Donato PA-C, on 12/12/2019. She initially took Ibuprofen and Tums without relief, however now is refusing medication. EKG this morning without obvious signs of acute myocardial ischemia. She is not hypoxic, denies shortness of breath.     - will check troponin x 1; in the unlikely event this is cardiac pain troponin will be elevated as she endorses pain x 7- days   - can try Protonix in the event this reflux   - can use Tylenol    ADDENDUM:  - troponin undetectable which favors chest pain not of cardiac etiology     LINN Brown, CNP  Hospitalist Service, House Officer  [facility]     Text Page  Pager: [phone]

## 2019-12-19 NOTE — PROGRESS NOTES
Pt c/o chest pain that is sharp and aching in the center of her chest. Pt rates her pain 10/10.Pt denies any SOB and does not experience any palpitations. Pain does not radiate. Pt states she has had this pain since admission however last evening and into the night it has gotten worse.  Pt was offered ibuprofen and Tums last night with no change. Pt refusing any medications at this time. Nursing to monitor.

## 2019-12-19 NOTE — PLAN OF CARE
BEHAVIORAL TEAM DISCUSSION    Participants:    dr thompson, RN's, CTC (v everett)  Progress:  very little progress.     Anticipated length of stay:   unknown  Continued Stay Criteria/Rationale: continue addressing mood instability which led to threatening others at school and threatening to harm herself.  Medical/Physical: no new issues  Precautions:   Behavioral Orders   Procedures    Code 1 - Restrict to Unit    Routine Programming     As clinically indicated    Self Injury Precaution    Status 15     Every 15 minutes.    Suicide precautions     Patients on Suicide Precautions should have a Combination Diet ordered that includes a Diet selection(s) AND a Behavioral Tray selection for Safe Tray - with utensils, or Safe Tray - NO utensils       Plan:  meds per dr thompson    Follow up appointments are in place, including DBT (see AVS)    We may need to consider IRT's placement (?)  Otherwise, pt will return to live with parents.     I've been unable to reach her outpt CM.  Rationale for change in precautions or plan: no change at this time

## 2019-12-19 NOTE — PROGRESS NOTES
"[facility] Psychiatric Progress Note       Interim History     The patient's care was discussed with the treatment team and chart notes were reviewed. It should be noted that the patient complained of chest pain yesterday. She had been experiencing this pain since admission date, however last night was the worst it had been. Patient was seen by Hospitalist this morning. Pt seen on 12/19/19 by Dr. Llanes. On interview, the patient declares she is doing alright this afternoon. Dr. Llanes asks about the chest pain the patient has been experiencing. Patient states, \"I think it's from stress,\" but she is unable to target what is causing her stress. Dr. Llanes reminds the patient why DBT will be a great option for the patient after hospitalization. DBT will help the patient manage her anxiety and stress. Patient continues to be in agreement with this plan. A new intake appointment for DBT is scheduled for 1/22/20.      Hospital Course   This is a 19 year old single female with previous psychiatric diagnoses of major depression, anxiety, ADHD, intellectual disability, and borderline personality disorder. She has a history of multiple inpatient mental health hospitalizations, dating back to when the patient was 12 years old. Her most recent admission being from 11/19/19-11/29/19. Patient presented to [facility] ED on 12/11/19 for mental health evaluation. Prior to admission, the patient had been having difficulties at school where other students were bullying her. The bullying worsened and eventually led the patient to make suicidal comments such as that she wanted to die and that she was done with life. Patient had an active plan of using a knife to cut her throat. She was deemed appropriate for inpatient level of care for further stabilization and medication management. While meeting with Dr. Llanes this afternoon, the patient presented depressed in mood and flat in affect. Her previous and " current psychiatric medications were heavily considered. Dr. Llanes reviewed the medication Abilify with the patient, such as its possible side effects and many benefits. This medication will particular augment the patients anti-depressant. Patient provided verbal consent, thus Ability 2mg daily was ordered. Patient tolerated this medication well, thus it was eventually increased to 10mg. Dr. Llanes believed the patient would benefit most from DBT. Patient in agreement with this plan.       Medications     Current Facility-Administered Medications Ordered in Epic   Medication Dose Route Frequency Last Rate Last Dose     acetaminophen (TYLENOL) tablet 1,000 mg  1,000 mg Oral Q6H PRN         ARIPiprazole (ABILIFY) tablet 10 mg  10 mg Oral QAM   10 mg at 12/19/19 0913     calcium carbonate (TUMS) chewable tablet 1,000 mg  1,000 mg Oral Q4H PRN   1,000 mg at 12/18/19 1834     hydrOXYzine (ATARAX) tablet 25-50 mg  25-50 mg Oral TID PRN   50 mg at 12/14/19 0815     ibuprofen (ADVIL/MOTRIN) tablet 600 mg  600 mg Oral Q6H PRN   600 mg at 12/18/19 2025     naltrexone (DEPADE/REVIA) tablet 50 mg  50 mg Oral Daily   50 mg at 12/19/19 0913     nicotine (NICORETTE) gum 2-4 mg  2-4 mg Buccal Q1H PRN   2 mg at 12/17/19 0913     [START ON 12/20/2019] pantoprazole (PROTONIX) EC tablet 40 mg  40 mg Oral QAM AC         polyethylene glycol (MIRALAX/GLYCOLAX) Packet 17 g  17 g Oral Daily PRN   17 g at 12/13/19 1356     QUEtiapine (SEROquel) tablet 25 mg  25 mg Oral 4x Daily PRN         QUEtiapine ER (SEROquel XR) 24 hr tablet 400 mg  400 mg Oral At Bedtime   400 mg at 12/18/19 2025     senna-docusate (SENOKOT-S/PERICOLACE) 8.6-50 MG per tablet 1-2 tablet  1-2 tablet Oral BID PRN         sodium chloride (OCEAN) 0.65 % nasal spray 1 spray  1 spray Nasal Q1H PRN         venlafaxine (EFFEXOR-XR) 24 hr capsule 300 mg  300 mg Oral Daily   300 mg at 12/19/19 0913     No current [facility]-ordered outpatient medications on file.          "Allergies      Allergies   Allergen Reactions     Penicillins Rash        Medical Review of Systems     /79   Pulse 111   Temp 97.5  F (36.4  C) (Oral)   Resp 16   Ht 1.626 m (5' 4\")   Wt 92.4 kg (203 lb 12.8 oz)   LMP 12/10/2019   SpO2 100%   BMI 34.98 kg/m    Body mass index is 34.98 kg/m .  A 10-point review of systems was performed by Eduardo Llanes MD and is negative, no new findings.      Psychiatric Examination     Appearance Sitting in chair, dressed in hospital scrubs. Appears stated age.   Attitude Cooperative   Orientation Oriented to person, place, time   Eye Contact Fine   Speech Regular rate, rhythm, volume and tone   Language Normal   Psychomotor Behavior Normal   Mood Less depressed   Affect Flat   Thought Process Goal-Oriented, Intact   Associations Intact   Thought Content Patient is currently positive for suicidal ideation, negative for plan or intent, able to contract no self harm and identify barriers to suicide.  Negative for obsessions, compulsions or psychosis.     Fund of Knowledge Intact   Insight Poor    Judgement Poor    Attention Span & Concentration Fair   Recent & Remote Memory Intact   Gait Normal   Muscle Tone Intact        Labs     Labs reviewed.  Recent Results (from the past 24 hour(s))   EKG 12-lead, complete    Collection Time: 12/19/19 10:45 AM   Result Value Ref Range    Interpretation ECG Click View Image link to view waveform and result         Impression     This is a 19 year old single female with previous psychiatric diagnoses of major depression, anxiety, ADHD, intellectual disability, and borderline personality disorder. Patient presented to [facility] ED on 12/11/19 for mental health evaluation. While meeting with Dr. Llanes this afternoon, the patient denied experiencing any new issues or complications with the overall care provided by [facility]  or her current medications. There were no medication modifications made today. The plan continues to be for " the patient to attend DBT after this hospitalization. New intake appointment scheduled for 1/22/20. Dr. Llanes would like to be in contact with the patients family members in regards of aftercare plans.      Diagnoses     1. Major depression, recurrent, severe, without psychotic features.  2. Borderline Personality Disorder   3. Intellectual Disability     Plan     1. Explained side effects, benefits, and complications of medications to the patient, Pt gave verbal consent.  2. Medication changes: None.   3. Discussed treatment plan with patient and team.  4. Projected length of stay: 7+ days   5. DBT after hospitalization. Intake appointment scheduled for 1/22/20  6. Consider IRTS placement      Attestation:   I, Nimisha Zaragoza, am serving as a scribe on 12/19/2019 to document services personally performed by Eduardo Llanes MD based on my observations and the provider's statements to me.    Patient ID:  Name: Sadaf Ross    MRN: [id number]  Admission: 12/12/2019   YOB: 2000

## 2019-12-19 NOTE — PLAN OF CARE
Problem: Pain Acute  Goal: Optimal Pain Control  Note:   Pt was seen by hospitalist for complaints of chest pain. EKG and troponin were done. Results was normal. Pt states the she was anxious and did not want to discuss the reason why. Was visible in the lounge. Pt was given PRN TUMS, Atarax, and Tylenol. ELENA Eckert SI.

## 2019-12-19 NOTE — PLAN OF CARE
Pt transitioned to OT group IND and engaged in therapeutic activity addressing functional cognition and interpersonal skills with task set up. With MIN s, pt participated in therapeutic group activity and discussion addressing illness management and recovery. Pt ID'd positive supports in her life (parents, grandma), something she hasn't done before but would like to (joining a gym), a skill she has (good at basketball), and with MIN A, ID'd strategy to help with time management (make a list). Pt will continue to benefit from OT intervention to address implementation of positive functional coping skills, role performance, and community reintegration.

## 2019-12-19 NOTE — PLAN OF CARE
"Pt reports that she is not currently having SI/SIB thoughts but reports that they come and go. She reports last night that she felt her  sister that  at birth in  touch her. She reports that she has been thinking about her a lot and it makes her very sad causing urges to self harm. When I asked her how she would harm herself she stated that she wanted to bang her head on the window or bight herself. She became tearful following discussing her sister. During the evening shift pt was present in the milieu, attended groups and was socially appropriate with others. Evening shift was otherwise unremarkable.    Addendum: Per RN, pt stated her \"heart hurts\", nurse gave pt tums and ibuprofen, a hot pack was also provided. PA staff stated that pt described pain as sharp. Following medication and hot pack, pt has not made any additional complaints.  "

## 2019-12-20 PROCEDURE — 25000132 ZZH RX MED GY IP 250 OP 250 PS 637: Performed by: PSYCHIATRY & NEUROLOGY

## 2019-12-20 PROCEDURE — 25000132 ZZH RX MED GY IP 250 OP 250 PS 637: Performed by: NURSE PRACTITIONER

## 2019-12-20 PROCEDURE — 12400000 ZZH R&B MH

## 2019-12-20 RX ADMIN — ARIPIPRAZOLE 10 MG: 10 TABLET ORAL at 08:33

## 2019-12-20 RX ADMIN — PANTOPRAZOLE SODIUM 40 MG: 40 TABLET, DELAYED RELEASE ORAL at 08:33

## 2019-12-20 RX ADMIN — VENLAFAXINE HYDROCHLORIDE 300 MG: 150 CAPSULE, EXTENDED RELEASE ORAL at 08:33

## 2019-12-20 RX ADMIN — QUETIAPINE FUMARATE 400 MG: 200 TABLET, EXTENDED RELEASE ORAL at 21:49

## 2019-12-20 RX ADMIN — NALTREXONE HYDROCHLORIDE 50 MG: 50 TABLET, FILM COATED ORAL at 08:33

## 2019-12-20 ASSESSMENT — ACTIVITIES OF DAILY LIVING (ADL)
LAUNDRY: WITH SUPERVISION
ORAL_HYGIENE: INDEPENDENT
HYGIENE/GROOMING: INDEPENDENT
ORAL_HYGIENE: INDEPENDENT
LAUNDRY: WITH SUPERVISION
DRESS: INDEPENDENT
DRESS: INDEPENDENT
HYGIENE/GROOMING: INDEPENDENT

## 2019-12-20 NOTE — PROGRESS NOTES
Shift Update: Pt mood is calm. Thought process is impaired. Insight is impaired. Pt reports suicidal ideation without plan and contracts for safety. Noted pt is not ordering enough food and will need help filling out her menu. No c/o chest pain. Visible in lounge and social with peers.

## 2019-12-20 NOTE — PROGRESS NOTES
Patient requested to speak with an RN. Said she is not sure she is ready to discharge tomorrow because she was chewing on her finger to help with anxiety. Finger only slightly reddened, no cares required. Patient said when the overhead baby announcement chimes she feels like smashing her head on a wall or table because it reminds her of her sister who  at birth due to a medication her mother received during labor. This happened when patient was in [employer]. Patient contracts for safety currently.

## 2019-12-20 NOTE — PROGRESS NOTES
received a call from Michelle Singh, inpatient care coordinator with [facility] ([phone]). She stated that she has talked with patient several times during this hospitalization. She has concerns that patient appears to be low functioning and vulnerable. She stated that patient's parents do not appear to be very supportive. She would like to discuss any discharge plans as available.

## 2019-12-20 NOTE — PROGRESS NOTES
[facility] Psychiatric Progress Note       Interim History     The patient's care was discussed with the treatment team and chart notes were reviewed. Pt seen on 12/20/19 by Dr. Llanes. Patient reports she is good today, however is feeling a bit tired. She denies having any problems with her current medications. Dr. Llanes does not wish to make any changes to regimen today, will continue patient on Abilify 10mg daily, Effexor 300mg daily, and Seroquel 400mg at bedtime. Aside from this, Dr. Llanes and the patient talk more about the patients aftercare plans and her living situation. Dr. Llanes would like to be in contact with the patients family once again to discuss the patients living plans after hospital stay. The patient reports she would like to go back to her parents house, however from what it sounds like, the patients parents are a bit hesitant for the patient to come back to their home.      Hospital Course   This is a 19 year old single female with previous psychiatric diagnoses of major depression, anxiety, ADHD, intellectual disability, and borderline personality disorder. She has a history of multiple inpatient mental health hospitalizations, dating back to when the patient was 12 years old. Her most recent admission being from 11/19/19-11/29/19. Patient presented to [facility] ED on 12/11/19 for mental health evaluation. Prior to admission, the patient had been having difficulties at school where other students were bullying her. The bullying worsened and eventually led the patient to make suicidal comments such as that she wanted to die and that she was done with life. Patient had an active plan of using a knife to cut her throat. She was deemed appropriate for inpatient level of care for further stabilization and medication management. While meeting with Dr. Llanes this afternoon, the patient presented depressed in mood and flat in affect. Her previous and current  psychiatric medications were heavily considered. Dr. Llanes reviewed the medication Abilify with the patient, such as its possible side effects and many benefits. This medication will particular augment the patients anti-depressant. Patient provided verbal consent, thus Ability 2mg daily was ordered. Patient tolerated this medication well, thus it was eventually increased to 10mg. Dr. Llanes believed the patient would benefit most from DBT. Patient in agreement with this plan.       Medications     Current Facility-Administered Medications Ordered in Epic   Medication Dose Route Frequency Last Rate Last Dose     acetaminophen (TYLENOL) tablet 1,000 mg  1,000 mg Oral Q6H PRN   1,000 mg at 12/19/19 1233     ARIPiprazole (ABILIFY) tablet 10 mg  10 mg Oral QAM   10 mg at 12/19/19 0913     calcium carbonate (TUMS) chewable tablet 1,000 mg  1,000 mg Oral Q4H PRN   1,000 mg at 12/19/19 1234     hydrOXYzine (ATARAX) tablet 25-50 mg  25-50 mg Oral TID PRN   50 mg at 12/19/19 1233     ibuprofen (ADVIL/MOTRIN) tablet 600 mg  600 mg Oral Q6H PRN   600 mg at 12/18/19 2025     naltrexone (DEPADE/REVIA) tablet 50 mg  50 mg Oral Daily   50 mg at 12/19/19 0913     nicotine (NICORETTE) gum 2-4 mg  2-4 mg Buccal Q1H PRN   2 mg at 12/17/19 0913     pantoprazole (PROTONIX) EC tablet 40 mg  40 mg Oral QAM AC         polyethylene glycol (MIRALAX/GLYCOLAX) Packet 17 g  17 g Oral Daily PRN   17 g at 12/13/19 1356     QUEtiapine (SEROquel) tablet 25 mg  25 mg Oral 4x Daily PRN         QUEtiapine ER (SEROquel XR) 24 hr tablet 400 mg  400 mg Oral At Bedtime   400 mg at 12/19/19 2103     senna-docusate (SENOKOT-S/PERICOLACE) 8.6-50 MG per tablet 1-2 tablet  1-2 tablet Oral BID PRN         sodium chloride (OCEAN) 0.65 % nasal spray 1 spray  1 spray Nasal Q1H PRN         venlafaxine (EFFEXOR-XR) 24 hr capsule 300 mg  300 mg Oral Daily   300 mg at 12/19/19 8008     No current [facility]-ordered outpatient medications on file.         Allergies  "     Allergies   Allergen Reactions     Penicillins Rash        Medical Review of Systems     /70   Pulse 72   Temp 99  F (37.2  C) (Oral)   Resp 16   Ht 1.626 m (5' 4\")   Wt 92.4 kg (203 lb 12.8 oz)   LMP 12/10/2019   SpO2 100%   BMI 34.98 kg/m    Body mass index is 34.98 kg/m .  A 10-point review of systems was performed by Eduardo Llanes MD and is negative, no new findings.      Psychiatric Examination     Appearance Sitting in chair, dressed in hospital scrubs. Appears stated age.   Attitude Cooperative   Orientation Oriented to person, place, time   Eye Contact Fine   Speech Regular rate, rhythm, volume and tone   Language Normal   Psychomotor Behavior Normal   Mood Less depressed   Affect Flat   Thought Process Goal-Oriented, Intact   Associations Intact   Thought Content Patient is currently positive for suicidal ideation, negative for plan or intent, able to contract no self harm and identify barriers to suicide.  Negative for obsessions, compulsions or psychosis.     Fund of Knowledge Intact   Insight Poor    Judgement Poor    Attention Span & Concentration Fair   Recent & Remote Memory Intact   Gait Normal   Muscle Tone Intact        Labs     Labs reviewed.  Recent Results (from the past 24 hour(s))   EKG 12-lead, complete    Collection Time: 12/19/19 10:45 AM   Result Value Ref Range    Interpretation ECG Click View Image link to view waveform and result    Troponin I    Collection Time: 12/19/19 11:10 AM   Result Value Ref Range    Troponin I ES <0.015 0.000 - 0.045 ug/L        Impression     This is a 19 year old single female with previous psychiatric diagnoses of major depression, anxiety, ADHD, intellectual disability, and borderline personality disorder. Patient presented to [facility] ED on 12/11/19 for mental health evaluation. While meeting with Dr. Llanes this morning, the patient was again cooperative and denied having any issues or concerns with her current medications. There " were no medication adjustments made today, patient will continue on Abilify 10mg daily, Effexor 300mg daily, and Seroquel 400mg at bedtime. Aside from this, Dr. Llanes would like to be in contact with the patients parents in regards of aftercare plans. The plan proceeds to be for the patient to attend DBT.      Diagnoses     1. Major depression, recurrent, severe, without psychotic features.  2. Borderline Personality Disorder   3. Intellectual Disability     Plan     1. Explained side effects, benefits, and complications of medications to the patient, Pt gave verbal consent.  2. Medication changes: None.   3. Discussed treatment plan with patient and team.  4. Projected length of stay: 7+ days   5. DBT after hospitalization. Intake appointment scheduled for 1/22/20  6. Consider IRTS placement      Attestation:   I, Nimisha Zaragoza, am serving as a scribe on 12/20/2019 to document services personally performed by Eduardo Llanes MD based on my observations and the provider's statements to me.    Patient ID:  Name: Sadaf Ross    MRN: [id number]  Admission: 12/12/2019   YOB: 2000

## 2019-12-20 NOTE — PLAN OF CARE
Problem: Suicidal Behavior  Goal: Suicidal Behavior is Absent or Managed  Outcome: Improving  Note:   Pt presents with a blunted affect and calm but depressed mood. Her insight is poor and judgement is impaired. Pt's thought processes are organized. Pt states that she feels slightly anxious today, and expressed disappointment about having to wait through the weekend to be discharged. She was visible on the unit and attended daytime groups. Pt denies SI and is med-compliant. No complaints of chest pain or other recent physical symptoms today.

## 2019-12-21 PROCEDURE — 12400000 ZZH R&B MH

## 2019-12-21 PROCEDURE — 25000132 ZZH RX MED GY IP 250 OP 250 PS 637: Performed by: NURSE PRACTITIONER

## 2019-12-21 PROCEDURE — 25000132 ZZH RX MED GY IP 250 OP 250 PS 637: Performed by: PSYCHIATRY & NEUROLOGY

## 2019-12-21 RX ADMIN — NALTREXONE HYDROCHLORIDE 50 MG: 50 TABLET, FILM COATED ORAL at 08:37

## 2019-12-21 RX ADMIN — ARIPIPRAZOLE 10 MG: 10 TABLET ORAL at 08:37

## 2019-12-21 RX ADMIN — VENLAFAXINE HYDROCHLORIDE 300 MG: 150 CAPSULE, EXTENDED RELEASE ORAL at 08:37

## 2019-12-21 RX ADMIN — PANTOPRAZOLE SODIUM 40 MG: 40 TABLET, DELAYED RELEASE ORAL at 08:37

## 2019-12-21 RX ADMIN — QUETIAPINE FUMARATE 400 MG: 200 TABLET, EXTENDED RELEASE ORAL at 21:54

## 2019-12-21 ASSESSMENT — ACTIVITIES OF DAILY LIVING (ADL)
DRESS: INDEPENDENT
ORAL_HYGIENE: INDEPENDENT
LAUNDRY: WITH SUPERVISION
HYGIENE/GROOMING: INDEPENDENT

## 2019-12-21 NOTE — PLAN OF CARE
"Pt present with blunt, flat affect but brightened when she made a friend with another female patient. Seen socializing and laughing with this peer. Endorses SI thoughts that \"come and go here and there\". Able to contract for safety. Pt states she has been \"biting myself the last couple days\" on her L thumb. No fresh bite marks observed by this writer. Spoke with pt about finding staff and trying other coping mechanisms. Verbalized disappointment about not being discharged today. Attended groups. Med compliant.   "

## 2019-12-21 NOTE — PLAN OF CARE
"Pt spent most of the morning resting. During 1:1 she expressed feeling \"tired, as usual\" but that she is feeling \"a little better.\" She attended Community Meeting and part of OT today. She stated she felt she \"let my sister down\" when she wasn't able to discharge home yesterday. When asked about thoughts of self-harm, she said she had some last night and began speaking about her family. She denies thoughts of self-harm today. She was observed socializing with a peer. She showered and was med compliant.     Addendum: at around 1500, pt requested to speak with this writer. She stated she is not feeling safe and is having thoughts of harming herself, and had bit her hand and pulled the skin off of her blister. Writer did not see any evidence of injury. Pt stated she wants to go to \"a different hospital\" because she needs \"a 1:1.\" Pt did not contract for safety. She was asked to sit in direct line of sight of RN station until oncoming charge nurse advises.   "

## 2019-12-22 PROCEDURE — 25000132 ZZH RX MED GY IP 250 OP 250 PS 637: Performed by: PSYCHIATRY & NEUROLOGY

## 2019-12-22 PROCEDURE — 12400000 ZZH R&B MH

## 2019-12-22 PROCEDURE — 25000132 ZZH RX MED GY IP 250 OP 250 PS 637: Performed by: NURSE PRACTITIONER

## 2019-12-22 RX ADMIN — ARIPIPRAZOLE 10 MG: 10 TABLET ORAL at 09:00

## 2019-12-22 RX ADMIN — NALTREXONE HYDROCHLORIDE 50 MG: 50 TABLET, FILM COATED ORAL at 08:59

## 2019-12-22 RX ADMIN — VENLAFAXINE HYDROCHLORIDE 300 MG: 150 CAPSULE, EXTENDED RELEASE ORAL at 08:59

## 2019-12-22 RX ADMIN — QUETIAPINE FUMARATE 400 MG: 200 TABLET, EXTENDED RELEASE ORAL at 20:26

## 2019-12-22 RX ADMIN — PANTOPRAZOLE SODIUM 40 MG: 40 TABLET, DELAYED RELEASE ORAL at 09:00

## 2019-12-22 RX ADMIN — NICOTINE POLACRILEX 2 MG: 2 GUM, CHEWING ORAL at 18:42

## 2019-12-22 ASSESSMENT — ACTIVITIES OF DAILY LIVING (ADL)
DRESS: INDEPENDENT
HYGIENE/GROOMING: INDEPENDENT
LAUNDRY: WITH SUPERVISION
HYGIENE/GROOMING: INDEPENDENT
DRESS: INDEPENDENT
ORAL_HYGIENE: INDEPENDENT
LAUNDRY: WITH SUPERVISION
ORAL_HYGIENE: INDEPENDENT

## 2019-12-22 NOTE — PLAN OF CARE
Pt spent majority of time in the hallway. Pt cried multiple times about no one visiting her and about not being home for Hugh.  Pt made several calls to family members who would not commit to visiting her. Pt went to bed at 8. Required Pt to keep door open to ensure safety.

## 2019-12-22 NOTE — PLAN OF CARE
"Pt appeared slightly more present on the milieu this shift. Her goal for today was to keep engaging with staff. During initial 1:1 pt stated she continues to feel \"tired, crummy.\" She endorsed continued urges to self injure, but contracted for safety. She was observed talking and laughing on the phone and engages with her new roommate.     During Activity Group in the OT room this afternoon, pt engaged in conversation with this writer. She stated she had a \"hard night\" last night and \"tried to murder myself\" by banging her head on the window and biting herself. When this writer asked if she was still having similar thoughts she replied \"a little bit, it's so hard not to.\" Her affect continues to appear incongruent when speaking about these thoughts and urges. She continues to be compliant with medication and nursing cares.   "

## 2019-12-23 VITALS
OXYGEN SATURATION: 97 % | DIASTOLIC BLOOD PRESSURE: 77 MMHG | HEART RATE: 66 BPM | HEIGHT: 64 IN | RESPIRATION RATE: 16 BRPM | TEMPERATURE: 97.9 F | WEIGHT: 203.8 LBS | BODY MASS INDEX: 34.79 KG/M2 | SYSTOLIC BLOOD PRESSURE: 118 MMHG

## 2019-12-23 LAB — INTERPRETATION ECG - MUSE: NORMAL

## 2019-12-23 PROCEDURE — 25000132 ZZH RX MED GY IP 250 OP 250 PS 637: Performed by: PSYCHIATRY & NEUROLOGY

## 2019-12-23 PROCEDURE — G0177 OPPS/PHP; TRAIN & EDUC SERV: HCPCS

## 2019-12-23 PROCEDURE — 25000132 ZZH RX MED GY IP 250 OP 250 PS 637: Performed by: NURSE PRACTITIONER

## 2019-12-23 RX ORDER — ARIPIPRAZOLE 10 MG/1
10 TABLET ORAL EVERY MORNING
Qty: 30 TABLET | Refills: 0 | Status: SHIPPED | OUTPATIENT
Start: 2019-12-24 | End: 2021-05-01

## 2019-12-23 RX ORDER — PANTOPRAZOLE SODIUM 40 MG/1
40 TABLET, DELAYED RELEASE ORAL
Qty: 30 TABLET | Refills: 0 | Status: SHIPPED | OUTPATIENT
Start: 2019-12-24 | End: 2020-01-23

## 2019-12-23 RX ADMIN — ARIPIPRAZOLE 10 MG: 10 TABLET ORAL at 08:14

## 2019-12-23 RX ADMIN — NALTREXONE HYDROCHLORIDE 50 MG: 50 TABLET, FILM COATED ORAL at 08:14

## 2019-12-23 RX ADMIN — PANTOPRAZOLE SODIUM 40 MG: 40 TABLET, DELAYED RELEASE ORAL at 08:14

## 2019-12-23 RX ADMIN — VENLAFAXINE HYDROCHLORIDE 300 MG: 150 CAPSULE, EXTENDED RELEASE ORAL at 08:14

## 2019-12-23 RX ADMIN — NICOTINE POLACRILEX 4 MG: 2 GUM, CHEWING ORAL at 15:28

## 2019-12-23 NOTE — DISCHARGE SUMMARY
[facility] Psychiatric Discharge Summary      DATE OF ADMISSION: 12/12/2019     DATE OF DISCHARGE: 12/23/2019    PRIMARY CARE PHYSICIAN: Cathie Alves    IDENTIFICATION: For history, see dictation by Dr. Llanes on 12/12/19 For physical summary, see dictation by Clayton Donato PA-C on 12/12/19.     HOSPITAL COURSE:   This is a 19 year old single female with previous psychiatric diagnoses of major depression, anxiety, ADHD, intellectual disability, and borderline personality disorder. She has a history of multiple inpatient mental health hospitalizations, dating back to when the patient was 12 years old. Her most recent admission being from 11/19/19-11/29/19. Patient presented to [facility] ED on 12/11/19 for mental health evaluation. Prior to admission, the patient had been having difficulties at school where other students were bullying her. The bullying worsened and eventually led the patient to make suicidal comments such as that she wanted to die and that she was done with life. Patient had an active plan of using a knife to cut her throat. She was deemed appropriate for inpatient level of care for further stabilization and medication management. While meeting with Dr. Llanes this afternoon, the patient presented depressed in mood and flat in affect. Her previous and current psychiatric medications were heavily considered. Dr. Llanes reviewed the medication Abilify with the patient, such as its possible side effects and many benefits. This medication will particular augment the patients anti-depressant. Patient provided verbal consent, thus Ability 2mg daily was ordered. Patient tolerated this medication well, thus it was eventually increased to 10mg. Dr. Llanes believed the patient would benefit most from DBT. Patient in agreement with this plan.        DISCHARGE MENTAL STATUS EXAMINATION:    Psychiatric Examination      Appearance Sitting in chair, dressed in hospital  "scrubs. Appears stated age.   Attitude Cooperative   Orientation Oriented to person, place, time   Eye Contact Fine   Speech Regular rate, rhythm, volume and tone   Language Normal   Psychomotor Behavior Normal   Mood Less depressed   Affect Flat   Thought Process Goal-Oriented, Intact   Associations Intact   Thought Content Patient is currently positive for suicidal ideation, negative for plan or intent, able to contract no self harm and identify barriers to suicide.  Negative for obsessions, compulsions or psychosis.     Fund of Knowledge Intact   Insight Poor    Judgement Poor    Attention Span & Concentration Fair   Recent & Remote Memory Intact   Gait Normal   Muscle Tone Intact         LABORATORY DATA:    Refer to hospitalist admission dictation.  No results found for this or any previous visit (from the past 24 hour(s)).     /77   Pulse 66   Temp 97.9  F (36.6  C) (Oral)   Resp 16   Ht 1.626 m (5' 4\")   Wt 92.4 kg (203 lb 12.8 oz)   LMP 12/10/2019   SpO2 97%   BMI 34.98 kg/m       DISCHARGE MEDICATIONS:      Review of your medicines      START taking      Dose / Directions   ARIPiprazole 10 MG tablet  Commonly known as:  ABILIFY  Used for:  Suicidal ideation      Dose:  10 mg  Start taking on:  December 24, 2019  Take 1 tablet (10 mg) by mouth every morning  Quantity:  30 tablet  Refills:  0     pantoprazole 40 MG EC tablet  Commonly known as:  PROTONIX  Used for:  Suicidal ideation      Dose:  40 mg  Start taking on:  December 24, 2019  Take 1 tablet (40 mg) by mouth every morning (before breakfast)  Quantity:  30 tablet  Refills:  0        CONTINUE these medicines which have NOT CHANGED      Dose / Directions   hydrOXYzine 25 MG tablet  Commonly known as:  ATARAX  Used for:  Borderline personality disorder (H), Major depressive disorder, recurrent episode, in partial remission (H)      Dose:  25-50 mg  Take 1-2 tablets (25-50 mg) by mouth 3 times daily as needed for itching  Quantity:  60 " tablet  Refills:  1     naltrexone 50 MG tablet  Commonly known as:  DEPADE/REVIA  Used for:  Borderline personality disorder (H)      Dose:  50 mg  Take 1 tablet (50 mg) by mouth daily  Quantity:  30 tablet  Refills:  1     QUEtiapine  MG 24 hr tablet  Commonly known as:  SEROquel XR  Used for:  Borderline personality disorder (H), Major depressive disorder, recurrent episode, in partial remission (H)      Dose:  400 mg  Take 1 tablet (400 mg) by mouth At Bedtime  Quantity:  30 tablet  Refills:  1     venlafaxine 150 MG 24 hr capsule  Commonly known as:  EFFEXOR-XR  Used for:  Borderline personality disorder (H), Major depressive disorder, recurrent episode, in partial remission (H)      Dose:  300 mg  Take 2 capsules (300 mg) by mouth daily  Quantity:  60 capsule  Refills:  1     Vitamin D (Cholecalciferol) 25 MCG (1000 UT) Tabs      Dose:  1,000 Units  Take 1,000 Units by mouth daily  Refills:  0        STOP taking    nicotine 7 MG/24HR 24 hr patch  Commonly known as:  NICODERM CQ        polyethylene glycol packet  Commonly known as:  MIRALAX/GLYCOLAX              Where to get your medicines      These medications were sent to [place] [address] [place] [address]  [address] [facility] [address]    Phone:  [phone]     ARIPiprazole 10 MG tablet    pantoprazole 40 MG EC tablet         DISCHARGE DIAGNOSES:    1. Major depression, recurrent, severe, without psychotic features.  2. Borderline Personality Disorder   3. Intellectual Disability    DISCHARGE PLAN:     1. See 's Note.     DISCHARGE FOLLOW-UP:    1. [facility] faxed patient information to [facility]. Writer contacted Michelle Singh ([phone]),  through her insurance and gave her an update on patient regarding discharge disposition, DBT plan and medication management appointment.     Attestation:   Patient has been seen and evaluated by me, Eduardo Llanes,  MD.    Patient ID:    Name: Sadaf Ross MRN: [id number]  Admission: 12/12/2019  YOB: 2000

## 2019-12-23 NOTE — PROGRESS NOTES
Pt discharged to home. Mom and sister provided transportation. Contracts for safety at time of discharge.

## 2019-12-23 NOTE — PROGRESS NOTES
SW:   D: SW called patients father, Matt Ross to let him know of patients discharge today, 12/23/19. Matt was under the impression that patient would be ready to go immediately and stated he could be here in a half hour. Writer explained to Matt that we like to give families some notice so they have time to plan and figure out the ride situation, especially on a Monday when we understand family may be working or other. Writer explained that patient would be ready to discharge at about 16:00 today.     Tawanna Robles, ZEESHAN, [facility]  [phone]

## 2019-12-23 NOTE — PLAN OF CARE
BEHAVIORAL TEAM DISCUSSION    Participants: MD, RN's, CM, PA, OT   Progress: Appropriate for discharge  Anticipated length of stay: 12/23 or 12/24  Continued Stay Criteria/Rationale: Ready for discharge  Medical/Physical: N/A  Precautions:   Behavioral Orders   Procedures    Code 1 - Restrict to Unit    Routine Programming     As clinically indicated    Self Injury Precaution    Status 15     Every 15 minutes.    Suicide precautions     Patients on Suicide Precautions should have a Combination Diet ordered that includes a Diet selection(s) AND a Behavioral Tray selection for Safe Tray - with utensils, or Safe Tray - NO utensils       Plan: Ready for discharge  Rationale for change in precautions or plan: Ready for discharge

## 2019-12-23 NOTE — DISCHARGE INSTRUCTIONS
"Behavioral Discharge Planning and Instructions      Summary:  [facility] were [facility] under the care of Dr Llanes.You were admitted due to mood instability and thoughts of harming yourself and others. You reported feeling \"bullied\" at school and threatened to harm other students and yourself.      Main Diagnosis:   Major depression, recurrent, severe, without psychotic features.   Borderline Personality Disorder    Intellectual Disability     Mental Health Follow-Up:   [facility]:   *Note:   All of the appointments at [facility] have a cancellation policy.    Call 24-48 hours in advance of the appointment if you can't make it.    Med Provider: Cathie Alves NP - Next appointment: Monday January 13th at 9:20am    [address] [facility] [address] [facility] [address] [place] [address]  Phone:   [phone]         Fax: [phone]     --------------------------------    New referral:    DBT intake appointment at [facility] -   with Ruthy Brown. Wednesday January 22nd at 11am.     This is a one hour appt.       ____________________    formerly [facility] worker:   German Laura at [phone]    ---------------------------------     Therapy:   Grady Ascencio, [facility]   [address] [place] [address]  Phone: [phone]      at R: Gaby Alcantar [phone]         Attend all scheduled appointments with your outpatient providers. Call at least 24 hours in advance if you need to reschedule an appointment to ensure continued access to your outpatient providers.   Major Treatments, Procedures and Findings:  You were provided with: a psychiatric assessment, assessed for medical stability, medication evaluation and/or management and group therapy    Symptoms to Report: feeling more aggressive, increased confusion, losing more sleep, mood getting worse or thoughts of suicide    Early warning signs can include: increased depression or anxiety sleep disturbances increased " thoughts or behaviors of suicide or self-harm  increased unusual thinking, such as paranoia or hearing voices    Safety and Wellness:  Take all medicines as directed.  Make no changes unless your doctor suggests them.      Follow treatment recommendations.  Refrain from alcohol and non-prescribed drugs.  If there is a concern for safety, call 911.    Resources:   Crisis Intervention: 314.800.5029 or 909-622-8632 (TTY: 254.991.5735).  Call anytime for help.  National [place] on Mental Illness (www.[place].celine.org): 813.849.7594 or 718-690-5825.  Suicide Awareness Voices of Education (SAVE) (www.save.org): 868-441-XZDA (3406)  National Suicide Prevention Line (www.mentalhealthmn.org): 177-118-BWTF (0056)  Mental Health Consumer/Survivor Network of [place] (www.mhcsn.net): 910.329.3417 or 789-548-3813  Mental Health Association of [place] (www.mentalhealth.org): 275.423.7631 or 168-692-9285  Self- Management and Recovery Training., SMART-- Toll free: 519.720.5305  www.[employer].org  [facility] Crisis Response - Adult 726 800-5759    The treatment team has appreciated the opportunity to work with you. If you have any questions or concerns our unit number is [phone]

## 2019-12-23 NOTE — PLAN OF CARE
Pt was social and interactive today.  Pt more moderate in mood and affect. Pt spent a lot of time laughing and talking with room-mate.  Pt did not cry this shift and did not talk about not going home for [place].  Pt attended OT and Wrap up group.

## 2019-12-23 NOTE — PROGRESS NOTES
[facility] Psychiatric Progress Note       Interim History     The patient's care was discussed with the treatment team and chart notes were reviewed. Pt seen on 12/23/19 by Dr. Llanes. Patient reports she is good today, however is feeling a bit tired. She denies having any problems with her current medications. Dr. Llanes does not wish to make any changes to regimen today, will continue patient on Abilify 10mg daily, Effexor 300mg daily, and Seroquel 400mg at bedtime. Aside from this, e.      Hospital Course   This is a 19 year old single female with previous psychiatric diagnoses of major depression, anxiety, ADHD, intellectual disability, and borderline personality disorder. She has a history of multiple inpatient mental health hospitalizations, dating back to when the patient was 12 years old. Her most recent admission being from 11/19/19-11/29/19. Patient presented to [facility] ED on 12/11/19 for mental health evaluation. Prior to admission, the patient had been having difficulties at school where other students were bullying her. The bullying worsened and eventually led the patient to make suicidal comments such as that she wanted to die and that she was done with life. Patient had an active plan of using a knife to cut her throat. She was deemed appropriate for inpatient level of care for further stabilization and medication management. While meeting with Dr. Llanes this afternoon, the patient presented depressed in mood and flat in affect. Her previous and current psychiatric medications were heavily considered. Dr. Llanes reviewed the medication Abilify with the patient, such as its possible side effects and many benefits. This medication will particular augment the patients anti-depressant. Patient provided verbal consent, thus Ability 2mg daily was ordered. Patient tolerated this medication well, thus it was eventually increased to 10mg. Dr. Llanes believed the patient  "would benefit most from DBT. Patient in agreement with this plan.       Medications     Current Facility-Administered Medications Ordered in Epic   Medication Dose Route Frequency Last Rate Last Dose     acetaminophen (TYLENOL) tablet 1,000 mg  1,000 mg Oral Q6H PRN   1,000 mg at 12/19/19 1233     ARIPiprazole (ABILIFY) tablet 10 mg  10 mg Oral QAM   10 mg at 12/22/19 0900     calcium carbonate (TUMS) chewable tablet 1,000 mg  1,000 mg Oral Q4H PRN   1,000 mg at 12/19/19 1234     hydrOXYzine (ATARAX) tablet 25-50 mg  25-50 mg Oral TID PRN   50 mg at 12/19/19 1233     ibuprofen (ADVIL/MOTRIN) tablet 600 mg  600 mg Oral Q6H PRN   600 mg at 12/18/19 2025     naltrexone (DEPADE/REVIA) tablet 50 mg  50 mg Oral Daily   50 mg at 12/22/19 0859     nicotine (NICORETTE) gum 2-4 mg  2-4 mg Buccal Q1H PRN   2 mg at 12/22/19 1842     pantoprazole (PROTONIX) EC tablet 40 mg  40 mg Oral QAM AC   40 mg at 12/22/19 0900     polyethylene glycol (MIRALAX/GLYCOLAX) Packet 17 g  17 g Oral Daily PRN   17 g at 12/13/19 1356     QUEtiapine (SEROquel) tablet 25 mg  25 mg Oral 4x Daily PRN         QUEtiapine ER (SEROquel XR) 24 hr tablet 400 mg  400 mg Oral At Bedtime   400 mg at 12/22/19 2026     senna-docusate (SENOKOT-S/PERICOLACE) 8.6-50 MG per tablet 1-2 tablet  1-2 tablet Oral BID PRN         sodium chloride (OCEAN) 0.65 % nasal spray 1 spray  1 spray Nasal Q1H PRN         venlafaxine (EFFEXOR-XR) 24 hr capsule 300 mg  300 mg Oral Daily   300 mg at 12/22/19 0859     No current [facility]-ordered outpatient medications on file.         Allergies      Allergies   Allergen Reactions     Penicillins Rash        Medical Review of Systems     /81 (BP Location: Left arm)   Pulse 68   Temp 98.6  F (37  C) (Oral)   Resp 16   Ht 1.626 m (5' 4\")   Wt 92.4 kg (203 lb 12.8 oz)   LMP 12/10/2019   SpO2 98%   BMI 34.98 kg/m    Body mass index is 34.98 kg/m .  A 10-point review of systems was performed by Eduardo Llanes MD and is negative, " no new findings.      Psychiatric Examination     Appearance Sitting in chair, dressed in hospital scrubs. Appears stated age.   Attitude Cooperative   Orientation Oriented to person, place, time   Eye Contact Fine   Speech Regular rate, rhythm, volume and tone   Language Normal   Psychomotor Behavior Normal   Mood Less depressed   Affect Flat   Thought Process Goal-Oriented, Intact   Associations Intact   Thought Content Patient is currently positive for suicidal ideation, negative for plan or intent, able to contract no self harm and identify barriers to suicide.  Negative for obsessions, compulsions or psychosis.     Fund of Knowledge Intact   Insight Poor    Judgement Poor    Attention Span & Concentration Fair   Recent & Remote Memory Intact   Gait Normal   Muscle Tone Intact        Labs     Labs reviewed.  No results found for this or any previous visit (from the past 24 hour(s)).     Impression     This is a 19 year old single female with previous psychiatric diagnoses of major depression, anxiety, ADHD, intellectual disability, and borderline personality disorder. Patient presented to [facility] ED on 12/11/19 for mental health evaluation. While meeting with Dr. Llanes this morning, the patient was again cooperative and denied having any issues or concerns with her current medications. There were no medication adjustments made today, patient will continue on Abilify 10mg daily, Effexor 300mg daily, and Seroquel 400mg at bedtime. Aside from this, Dr. Llanes would like to be in contact with the patients parents in regards of aftercare plans. The plan proceeds to be for the patient to attend [facility].      Diagnoses     1. Major depression, recurrent, severe, without psychotic features.  2. Borderline Personality Disorder   3. Intellectual Disability     Plan     1. Explained side effects, benefits, and complications of medications to the patient, Pt gave verbal consent.  2. Medication changes: None.    3. Discussed treatment plan with patient and team.  4. Projected length of stay: 7+ days   5. DBT after hospitalization. Missed appointment scheduled for 1/22/20 CM to [email or web address]  6. Consider IRTS placement      Attestation:   I, Nimisha Zaragoza, am serving as a scribe on 12/20/2019 to document services personally performed by Eduardo Llanes MD based on my observations and the provider's statements to me.    Patient ID:  Name: Sadaf Ross    MRN: [id number]  Admission: 12/12/2019   YOB: 2000

## 2019-12-23 NOTE — PLAN OF CARE
Plan to discharge today at 4pm. Family to  patient. Pt stated she had 16 emesis today, not seen by staff. Pt then denied having emesis. Denies S.I. Contracts for safety.

## 2019-12-23 NOTE — PROGRESS NOTES
SW:   D: Received discharge orders for patient. Patient to discharge at 16:00 and will be picked up by her sister. [facility] faxed patient information to [facility]. Writer contacted Michelle Singh ([phone]),  through her insurance and gave her an update on patient regarding discharge disposition, DBT plan and medication management appointment.     ZEESHAN Johnson, [facility]  [phone]

## 2019-12-23 NOTE — PROGRESS NOTES
With initial task set up and MIN encouragement, pt participated in therapeutic group activity and discussion addressing wellness. Pt ID'd things she is grateful for (e.g. clothes, food, loved ones), positive qualities she possesses (e.g. funny, goofy), and accomplishments (good at cooking, cleaning, and painting). Educated pt on benefits of reflecting on accomplishments, positive qualities, and things she's grateful for with pt verbalizing understanding. Additionally, pt ID'd goals for the future as to make more friends and get a job. Pt will continue to benefit from OT intervention to address implementation of positive functional coping skills, role performance, and community reintegration.

## 2019-12-24 ENCOUNTER — HOSPITAL ENCOUNTER (EMERGENCY)
Facility: CLINIC | Age: 19
Discharge: HOME | End: 2019-12-24
Attending: EMERGENCY MEDICINE | Admitting: EMERGENCY MEDICINE
Payer: COMMERCIAL

## 2019-12-24 VITALS
TEMPERATURE: 98.7 F | HEART RATE: 85 BPM | OXYGEN SATURATION: 100 % | RESPIRATION RATE: 20 BRPM | SYSTOLIC BLOOD PRESSURE: 130 MMHG | DIASTOLIC BLOOD PRESSURE: 77 MMHG

## 2019-12-24 DIAGNOSIS — R45.851 SUICIDAL IDEATION: ICD-10-CM

## 2019-12-24 DIAGNOSIS — F60.3 BORDERLINE PERSONALITY DISORDER (H): ICD-10-CM

## 2019-12-24 LAB
AMPHETAMINES UR QL SCN: NEGATIVE
BARBITURATES UR QL: NEGATIVE
BENZODIAZ UR QL: NEGATIVE
CANNABINOIDS UR QL SCN: NEGATIVE
COCAINE UR QL: NEGATIVE
HCG UR QL: NEGATIVE
OPIATES UR QL SCN: NEGATIVE
PCP UR QL SCN: NEGATIVE

## 2019-12-24 PROCEDURE — 81025 URINE PREGNANCY TEST: CPT | Performed by: EMERGENCY MEDICINE

## 2019-12-24 PROCEDURE — 99285 EMERGENCY DEPT VISIT HI MDM: CPT | Mod: 25

## 2019-12-24 PROCEDURE — 80307 DRUG TEST PRSMV CHEM ANLYZR: CPT | Performed by: EMERGENCY MEDICINE

## 2019-12-24 PROCEDURE — 25000132 ZZH RX MED GY IP 250 OP 250 PS 637: Performed by: EMERGENCY MEDICINE

## 2019-12-24 PROCEDURE — 90791 PSYCH DIAGNOSTIC EVALUATION: CPT

## 2019-12-24 RX ORDER — OLANZAPINE 10 MG/1
10 TABLET, ORALLY DISINTEGRATING ORAL ONCE
Status: COMPLETED | OUTPATIENT
Start: 2019-12-24 | End: 2019-12-24

## 2019-12-24 RX ORDER — OLANZAPINE 5 MG/1
5 TABLET ORAL 2 TIMES DAILY PRN
Qty: 10 TABLET | Refills: 0 | Status: SHIPPED | OUTPATIENT
Start: 2019-12-24 | End: 2021-05-01

## 2019-12-24 RX ADMIN — OLANZAPINE 10 MG: 10 TABLET, ORALLY DISINTEGRATING ORAL at 17:49

## 2019-12-24 NOTE — ED PROVIDER NOTES
History     Chief Complaint:  Psychiatric Evaluation    HPI   Sadaf Ross is a 19 year old female with a history of depression and suicidal ideation who carries a diagnosis of bipolar 1 disorder who presents via ambulance for the evaluation suicidal ideation. Of note, the patient was recently discharged from the hospital after being admitted on 12/12/19. The patient reports that she dropped something while working with her dad, that he yelled at her for this, and that this triggered her suicidal thoughts. The patient states that she texted her friend to call 911 and have EMS bring her to the ED because she was having these suicidal thoughts. She describes that her plan is to slash her own throat with a knife. She notes that she did attempt to harm herself by biting her own hand. The patient denies other issues.  The patient follows with [facility].     Allergies:  Penicillins      Medications:    Abilify  Atarax  Depade  Protonix  Seroquel  Effexor  Vitamin D     Past Medical History:    ADHD  Bipolar 1 disorder  Depression  Suicidal ideation     Past Surgical History:    Appendectomy     Family History:    History reviewed. No pertinent family history.      Social History:  Smoking status: Current Some Day Smoker  Alcohol use: No  Drug use: No  Marital Status:  Single [1]     Review of Systems   Constitutional: Negative for fever.   Gastrointestinal: Negative for vomiting.   Skin: Positive for wound.   Psychiatric/Behavioral: Positive for suicidal ideas.   All other systems reviewed and are negative.        Physical Exam     Patient Vitals for the past 24 hrs:   BP Temp Temp src Pulse Resp SpO2   12/24/19 1516 130/77 98.7  F (37.1  C) Oral 85 20 100 %        Physical Exam  General: Alert, interactive in mild distress. Endorses ongoing suicidal ideation with thoughts of cutting her neck with a knife.   Head:  Scalp is atraumatic  Eyes:  The pupils are equal, round, and reactive to light    EOM's  intact    No scleral icterus  ENT:      Nose:  The external nose is normal  Ears:  External ears are normal  Mouth/Throat: The oropharynx is normal    Mucus membranes are moist      Neck:  Normal range of motion.      There is no rigidity.    Trachea is in the midline         CV:  Regular rate and rhythm    No murmur   Resp:  Breath sounds are clear bilaterally    Non-labored, no retractions or accessory muscle use     MS:  Normal strength in all 4 extremities  Skin:  Warm and dry, No rash or lesions noted. Right hand has bruising in a circular configuration consistent with a bite wound. No break in the skin.   Neuro: Strength 5/5 x4.  Sensation intact  In all 4 extremities.        Psych:  Awake. Alert.      Appropriate interactions. Flat affect.     Emergency Department Course   Laboratory:  Drug abuse screen 77 urine: Negative   HCG qualitative urine: Negative     Interventions:   1749, Zyprexa, 10 mg, PO    Emergency Department Course:  Patient arrived via ambulance.     Past medical records, nursing notes, and vitals reviewed.  1707: I performed an exam of the patient and obtained history, as documented above.     [facility] evaluated the patient.     I have performed an in person assessment of the patient. Based on this assessment the patient no longer requires a one on one attendant at this point in time.    Trigger, Felipe Clark MD  5:11 PM  December 24, 2019    1733: I spoke with Dr. Llanes who refuses the patient and states there is no need for admission.     2235: I rechecked the patient. Explained findings to patient and patient.     Findings and plan explained to the Patient. Patient discharged home with instructions regarding supportive care, medications, and reasons to return. The importance of close follow-up was reviewed. The patient was prescribed Zyprexa.       Impression & Plan    Medical Decision Making:  Sadaf was seen and evaluated, the above workup was undertaken. I discussed the case with  Adelaide from DEC who saw the patient. I was also able to discuss the case with Dr. Llanes who recently discharged the patient. Dr. Shraddha feels that hospitalization is reenforcing her axis 2 behaviors. She received Zyprexa, was able to rest here for several hours, and subsequently is no longer feeling suicidal. Her family is willing to take her home. She is willing to go home. She has extensive resources as an outpatient. I prescribed Zyprexa to be used as need through the holiday until she can get into DBT therapy. If new symptoms develop, she will return to the ED immediately. Patient was in agreement with this plan and was subsequently discharged to home.     Diagnosis:    ICD-10-CM    1. Borderline personality disorder (H) F60.3    2. Suicidal ideation R45.851        Disposition:  Discharged to home with Zyprexa.    Discharge Medications:  New Prescriptions    OLANZAPINE (ZYPREXA) 5 MG TABLET    Take 1 tablet (5 mg) by mouth 2 times daily as needed (Agitation)     Scribe Disclosure:  I, Eduardo Tristan, am serving as a scribe at 4:08 PM on 12/24/2019 to document services personally performed by Felipe Weldon MD based on my observations and the provider's statements to me.      Eduardo Tristan  12/24/2019    EMERGENCY DEPARTMENT       Felipe Weldon MD  12/25/19 0026

## 2019-12-24 NOTE — PHARMACY-ADMISSION MEDICATION HISTORY
Pharmacy Medication History  Admission medication history interview status for the 12/24/2019  admission is complete. See EPIC admission navigator for prior to admission medications     Medication history sources: Patient and dischg med list  Medication history source reliability: Good  Adherence assessment: Good    Significant changes made to the medication list:        Additional medication history information:       Medication reconciliation completed by provider prior to medication history? No    Time spent in this activity: 15 min      Prior to Admission medications    Medication Sig Last Dose Taking? Auth Provider   ARIPiprazole (ABILIFY) 10 MG tablet Take 1 tablet (10 mg) by mouth every morning 12/24/2019 at Unknown time Yes Eduardo Llanes MD   hydrOXYzine (ATARAX) 25 MG tablet Take 1-2 tablets (25-50 mg) by mouth 3 times daily as needed for itching prn Yes Jl Connor MD   naltrexone (DEPADE/REVIA) 50 MG tablet Take 1 tablet (50 mg) by mouth daily 12/24/2019 at Unknown time Yes Jl Connor MD   pantoprazole (PROTONIX) 40 MG EC tablet Take 1 tablet (40 mg) by mouth every morning (before breakfast) 12/24/2019 at Unknown time Yes Eduardo Llanes MD   QUEtiapine ER (SEROQUEL XR) 400 MG 24 hr tablet Take 1 tablet (400 mg) by mouth At Bedtime 12/23/2019 at Unknown time Yes Jl Connor MD   venlafaxine (EFFEXOR-XR) 150 MG 24 hr capsule Take 2 capsules (300 mg) by mouth daily 12/24/2019 at Unknown time Yes Jl Connor MD   Vitamin D, Cholecalciferol, 25 MCG (1000 UT) TABS Take 1,000 Units by mouth daily  Past Week at Unknown time Yes Reported, Patient

## 2019-12-24 NOTE — ED TRIAGE NOTES
"Patient was admitted from 12/12-12/23. Discharged home by Dr. Llanes and patient was to start DBT therapy. Patient returns today after biting her finger and stating she \"would just like to talk to someone.\" Patient was biting herself at home and here in the hospital. She states \"triny is a hard time\" for her. Patient apparently texted a friend making some SI comments. Not suicidal here but is still biting herself.   "

## 2019-12-24 NOTE — ED AVS SNAPSHOT
Emergency Department  [address] [place] [address]  Phone:  [phone]  Fax:  [phone]                                    Sadaf Ross   MRN: [id number]    Department:   Emergency Department   Date of Visit:  12/24/2019           After Visit Summary Signature Page    I have received my discharge instructions, and my questions have been answered. I have discussed any challenges I see with this plan with the nurse or doctor.    ..........................................................................................................................................  Patient/Patient Representative Signature      ..........................................................................................................................................  Patient Representative Print Name and Relationship to Patient    ..................................................               ................................................  Date                                   Time    ..........................................................................................................................................  Reviewed by Signature/Title    ...................................................              ..............................................  Date                                               Time          22EPIC Rev 08/18

## 2019-12-24 NOTE — ED NOTES
Bed: ED03  Expected date: 12/24/19  Expected time: 3:04 PM  Means of arrival: Ambulance  Comments:  [age]f suicidal ideation ETA 1514

## 2019-12-25 ASSESSMENT — ENCOUNTER SYMPTOMS
FEVER: 0
VOMITING: 0
WOUND: 1

## 2019-12-25 NOTE — DISCHARGE INSTRUCTIONS
Discharge Instructions  Mental Health Concerns    You were seen today for mental health concerns, such as depression, anxiety, or suicidal thinking. Your provider feels that you do not require hospitalization at this time. However, your symptoms may become worse, and you may need to return to the Emergency Department. Most treatments of depression and suicidal thoughts are a process rather than a single intervention.  Medications and counseling can take several weeks or more to help.    Generally, every Emergency Department visit should have a follow-up clinic visit with either a primary or a specialty clinic/provider. Please follow-up as instructed by your emergency provider today.    By accepting these discharge instructions:  You promise to not harm yourself or others.  You agree that if you feel you are becoming unable to keep that promise, you will do something to help yourself before you do anything to harm yourself or others.   You agree to keep any safety plan arranged on your visit here today.  You agree to take any medication prescribed or recommended by your provider.  If you are getting worse, you can contact a friend or a family member, contact your counselor or family provider, contact a crisis line, or other options discussed with the provider or therapist today.  At any time, you can call 911 and return to the Emergency Department for more help.  You understand that follow-up is essential to your treatment, and you will make and keep appointments recommended on your visit today.    How to improve your mental health and prevent suicide:  Involve others by letting family, friends, counselors know.  Do not isolate yourself.  Avoid alcohol or drugs. Remove weapons, poisons from your home.  Try to stick to routines for eating, sleeping and getting regular exercise.    Try to get into sunlight. Bright natural light not only treats seasonal affective disorder but also depression.  Increase safe activities  that you enjoy.    If you feel worse, contact 1-800-suicide (1-906.478.3048), or call 911, or your primary provider/counselor for additional assistance.    If you were given a prescription for medicine here today, be sure to read all of the information (including the package insert) that comes with your prescription.  This will include important information about the medicine, its side effects, and any warnings that you need to know about.  The pharmacist who fills the prescription can provide more information and answer questions you may have about the medicine.  If you have questions or concerns that the pharmacist cannot address, please call or return to the Emergency Department.   Remember that you can always come back to the Emergency Department if you are not able to see your regular provider in the amount of time listed above, if you get any new symptoms, or if there is anything that worries you.

## 2020-01-30 ENCOUNTER — HOME CARE/HOSPICE - HEALTHEAST (OUTPATIENT)
Dept: HOME HEALTH SERVICES | Facility: HOME HEALTH | Age: 20
End: 2020-01-30

## 2020-05-09 ENCOUNTER — HOSPITAL ENCOUNTER (EMERGENCY)
Facility: CLINIC | Age: 20
Discharge: HOME | End: 2020-05-09
Attending: PSYCHIATRY & NEUROLOGY | Admitting: PSYCHIATRY & NEUROLOGY
Payer: COMMERCIAL

## 2020-05-09 VITALS
OXYGEN SATURATION: 100 % | HEART RATE: 102 BPM | DIASTOLIC BLOOD PRESSURE: 83 MMHG | SYSTOLIC BLOOD PRESSURE: 137 MMHG | TEMPERATURE: 97.7 F | RESPIRATION RATE: 18 BRPM

## 2020-05-09 DIAGNOSIS — F43.0 ACUTE REACTION TO STRESS: ICD-10-CM

## 2020-05-09 DIAGNOSIS — F60.3 BORDERLINE PERSONALITY DISORDER (H): ICD-10-CM

## 2020-05-09 PROCEDURE — 80320 DRUG SCREEN QUANTALCOHOLS: CPT | Performed by: EMERGENCY MEDICINE

## 2020-05-09 PROCEDURE — 99285 EMERGENCY DEPT VISIT HI MDM: CPT | Mod: 25 | Performed by: PSYCHIATRY & NEUROLOGY

## 2020-05-09 PROCEDURE — 99284 EMERGENCY DEPT VISIT MOD MDM: CPT | Mod: Z6 | Performed by: PSYCHIATRY & NEUROLOGY

## 2020-05-09 PROCEDURE — 90791 PSYCH DIAGNOSTIC EVALUATION: CPT

## 2020-05-09 PROCEDURE — 81025 URINE PREGNANCY TEST: CPT | Performed by: EMERGENCY MEDICINE

## 2020-05-09 PROCEDURE — 80307 DRUG TEST PRSMV CHEM ANLYZR: CPT | Performed by: EMERGENCY MEDICINE

## 2020-05-09 ASSESSMENT — ENCOUNTER SYMPTOMS
RESPIRATORY NEGATIVE: 1
HYPERACTIVE: 0
HALLUCINATIONS: 0
CONSTITUTIONAL NEGATIVE: 1
GASTROINTESTINAL NEGATIVE: 1
NERVOUS/ANXIOUS: 1
DECREASED CONCENTRATION: 1
EYES NEGATIVE: 1
NEUROLOGICAL NEGATIVE: 1
CARDIOVASCULAR NEGATIVE: 1
MUSCULOSKELETAL NEGATIVE: 1

## 2020-05-09 NOTE — ED AVS SNAPSHOT
[facility], [place], Emergency Department  [address]  [facility] [address]  Phone:  [phone]  Fax:  [phone]                                    Sadaf Ross   MRN: [id number]    Department:  [facility], Emergency Department   Date of Visit:  5/9/2020           After Visit Summary Signature Page    I have received my discharge instructions, and my questions have been answered. I have discussed any challenges I see with this plan with the nurse or doctor.    ..........................................................................................................................................  Patient/Patient Representative Signature      ..........................................................................................................................................  Patient Representative Print Name and Relationship to Patient    ..................................................               ................................................  Date                                   Time    ..........................................................................................................................................  Reviewed by Signature/Title    ...................................................              ..............................................  Date                                               Time          22EPIC Rev 08/18

## 2020-05-10 NOTE — ED NOTES
I have performed an in person assessment of the patient. Based on this assessment the patient no longer requires a one on one attendant at this point in time.    Magno Sena MD  9:37 PM  May 9, 2020           Magno Sena MD  05/09/20 4131

## 2020-05-10 NOTE — ED NOTES
"Bed: ED16B  Expected date: 5/9/20  Expected time: 8:56 PM  Means of arrival: Ambulance  Comments:  Isai [address]  19F  SI  Cooperative, \"no covid\"  "

## 2020-05-10 NOTE — DISCHARGE INSTRUCTIONS
Please follow-up [facility]-referred DBT day treatment to work on healthy coping skills and resilience  Follow-up established care and services

## 2020-05-10 NOTE — ED PROVIDER NOTES
ED Provider Note  [facility]      History     Chief Complaint   Patient presents with     Suicidal     at home tonight w/thoughts of cutting her throat.  pt called 911     HPI  Sadaf Ross is a 19 year old female who is here due to suicidal thoughts. She has history through the [facility] system where she has been hospitalized multiple times. She had been hospitalized at [facility] for 1 month early this year. Patient had 4 ED visits through [facility] last month for suicidal thoughts. It was felt she no longer benefited from inpatient hospitalized services. The ED had her waiting 40 hours for placement on her last visit and ultimately got sent home. They tried and failed at a Crisis Facility placement. Patient is followed by Dr Ahuja who talked to parents regarding discharge to home. Father was angry and upset with the disposition. Patient has long history of low frustration tolerance and often resorts to cutting and threatening suicide.    Today patient was upset with sister's friends who were teasing her on social media and patient felt ridiculed and guilty and ashamed. She reports having thoughts of cutting her throat. She then sent a 911 text. Patient reports she plans on blocking sister's friends. She reports taking her meds as prescribed. She denies using drugs. She denies having tried DBT previously. She does not recall when she last saw her psychiatrist and has not had any recent med changes. The  talked to mother who felt patient did not appear depressed nor was suicidal recently or even today. She feels comfortable with patient coming home. She had texted 911 without anyone's knowledge.    Please see [facility] Crisis Assessment on 05/09/2020 in Epic for further details.    Past Medical History  Past Medical History:   Diagnosis Date     ADHD (attention deficit hyperactivity disorder)      Bipolar 1 disorder (H)      Depressive disorder      Past Surgical  History:   Procedure Laterality Date     APPENDECTOMY       naltrexone (DEPADE/REVIA) 50 MG tablet  QUEtiapine ER (SEROQUEL XR) 400 MG 24 hr tablet  venlafaxine (EFFEXOR-XR) 150 MG 24 hr capsule  ARIPiprazole (ABILIFY) 10 MG tablet  hydrOXYzine (ATARAX) 25 MG tablet  OLANZapine (ZYPREXA) 5 MG tablet  Vitamin D, Cholecalciferol, 25 MCG (1000 UT) TABS      Allergies   Allergen Reactions     Penicillins Rash     Past medical history, past surgical history, medications, and allergies were reviewed with the patient.     Family History  No family history on file.  Family history was reviewed with the patient.     Social History  Social History     Tobacco Use     Smoking status: Current Some Day Smoker     Years: 5.00     Smokeless tobacco: Never Used   Substance Use Topics     Alcohol use: No     Drug use: No      Social history was reviewed with the patient.     Review of Systems   Constitutional: Negative.    HENT: Negative.    Eyes: Negative.    Respiratory: Negative.    Cardiovascular: Negative.    Gastrointestinal: Negative.    Genitourinary: Negative.    Musculoskeletal: Negative.    Skin: Negative.    Neurological: Negative.    Psychiatric/Behavioral: Positive for decreased concentration, self-injury and suicidal ideas. Negative for hallucinations. The patient is nervous/anxious. The patient is not hyperactive.    All other systems reviewed and are negative.        Physical Exam   BP: 137/83  Pulse: 102  Heart Rate: 109  Temp: 98.2  F (36.8  C)  Resp: 16  SpO2: 99 %  Physical Exam  Vitals signs and nursing note reviewed.   HENT:      Head: Normocephalic.      Nose: Nose normal.      Mouth/Throat:      Mouth: Mucous membranes are moist.   Eyes:      Pupils: Pupils are equal, round, and reactive to light.   Neck:      Musculoskeletal: Normal range of motion.   Pulmonary:      Effort: Pulmonary effort is normal.   Musculoskeletal: Normal range of motion.   Neurological:      General: No focal deficit present.       Mental Status: She is alert.   Psychiatric:         Attention and Perception: Attention and perception normal. She does not perceive auditory or visual hallucinations.         Mood and Affect: Mood normal. Affect is blunt.         Speech: Speech normal.         Behavior: Behavior normal. Behavior is not agitated, aggressive, hyperactive or combative. Behavior is cooperative.         Thought Content: Thought content normal. Thought content is not paranoid or delusional. Thought content does not include homicidal or suicidal ideation.         Cognition and Memory: Cognition and memory normal.         Judgment: Judgment normal.         ED Course      Procedures             Results for orders placed or performed during the hospital encounter of 05/09/20   Drug abuse screen 6 urine (tox)     Status: None   Result Value Ref Range    Amphetamine Qual Urine Negative NEG^Negative    Barbiturates Qual Urine Negative NEG^Negative    Benzodiazepine Qual Urine Negative NEG^Negative    Cannabinoids Qual Urine Negative NEG^Negative    Cocaine Qual Urine Negative NEG^Negative    Ethanol Qual Urine Negative NEG^Negative    Opiates Qualitative Urine Negative NEG^Negative   HCG qualitative urine     Status: None   Result Value Ref Range    HCG Qual Urine Negative NEG^Negative     Medications - No data to display     Assessments & Plan (with Medical Decision Making)   Patient with borderline personality disorder, depression and anxiety who got upset today as she felt her sister's friends were attacking her on social media. She now appears calmer and no longer feels distressed or overwhelmed. She plans on blocking sister's friends on her social media sites. Patient feels comfortable going home. She is recommended to engage in group DBT to learned healthy coping skills. P made a referral. Patient is recommended to follow-up established care and services.    I have reviewed the nursing notes. I have reviewed the findings, diagnosis,  plan and need for follow up with the patient.    New Prescriptions    No medications on file       Final diagnoses:   Borderline personality disorder (H)   Acute reaction to stress       --  Magno Sena MD   Emergency Medicine   [facility], [place], EMERGENCY DEPARTMENT  5/9/2020     Magno Sena MD  05/09/20 8050

## 2020-10-27 NOTE — PLAN OF CARE
Pt quiet, but visible in common areas; also present in Exercise group this afternoon. flat affect. Pt does not initiate conversation very often, but is respectful of peers and staff, interacts when approached. Pt withdrew to room after supper and did not attend evening groups. Eating well, med compliant.   Strong peripheral pulses

## 2020-10-30 ENCOUNTER — ANCILLARY PROCEDURE (OUTPATIENT)
Dept: CARDIOLOGY | Facility: CLINIC | Age: 20
End: 2020-10-30
Attending: NURSE PRACTITIONER
Payer: COMMERCIAL

## 2020-10-30 DIAGNOSIS — R55 SYNCOPE, UNSPECIFIED SYNCOPE TYPE: ICD-10-CM

## 2020-10-30 PROCEDURE — 0296T LEADLESS EKG MONITOR 3 TO 14 DAYS: CPT

## 2020-10-30 PROCEDURE — 0298T LEADLESS EKG MONITOR 3 TO 14 DAYS: CPT | Performed by: INTERNAL MEDICINE

## 2020-10-30 NOTE — PROGRESS NOTES
Per Dr. Jomar Ireland, patient to have 14 day zio monitor placed.  Diagnosis: syncope   Monitor placed: Yes  Patient Instructed: Yes  Patient verbalized understanding: Yes  Holter # [id number]  Placed By; IG  Documented By: MIHIR

## 2021-05-01 ENCOUNTER — TELEPHONE (OUTPATIENT)
Dept: BEHAVIORAL HEALTH | Facility: CLINIC | Age: 21
End: 2021-05-01

## 2021-05-01 ENCOUNTER — HOSPITAL ENCOUNTER (EMERGENCY)
Facility: CLINIC | Age: 21
Discharge: PSYCHIATRIC OR SUBSTANCE USE FACILITY | End: 2021-05-02
Attending: EMERGENCY MEDICINE | Admitting: EMERGENCY MEDICINE
Payer: MEDICARE

## 2021-05-01 DIAGNOSIS — F41.9 ANXIETY AND DEPRESSION: ICD-10-CM

## 2021-05-01 DIAGNOSIS — F90.9 ATTENTION DEFICIT HYPERACTIVITY DISORDER: ICD-10-CM

## 2021-05-01 DIAGNOSIS — F41.9 ANXIETY: ICD-10-CM

## 2021-05-01 DIAGNOSIS — F33.3 SEVERE RECURRENT MAJOR DEPRESSION WITH PSYCHOTIC FEATURES (H): ICD-10-CM

## 2021-05-01 DIAGNOSIS — T14.91XA SUICIDAL BEHAVIOR WITH ATTEMPTED SELF-INJURY (H): ICD-10-CM

## 2021-05-01 DIAGNOSIS — Z11.52 ENCOUNTER FOR SCREENING LABORATORY TESTING FOR SEVERE ACUTE RESPIRATORY SYNDROME CORONAVIRUS 2 (SARS-COV-2): ICD-10-CM

## 2021-05-01 DIAGNOSIS — F32.A ANXIETY AND DEPRESSION: ICD-10-CM

## 2021-05-01 DIAGNOSIS — R45.851 SUICIDAL IDEATION: ICD-10-CM

## 2021-05-01 DIAGNOSIS — F60.3 EXPLOSIVE PERSONALITY DISORDER (H): ICD-10-CM

## 2021-05-01 DIAGNOSIS — F70 MILD INTELLECTUAL DISABILITIES: ICD-10-CM

## 2021-05-01 LAB
AMPHETAMINES UR QL SCN: NEGATIVE
BARBITURATES UR QL: NEGATIVE
BENZODIAZ UR QL: NEGATIVE
CANNABINOIDS UR QL SCN: NEGATIVE
COCAINE UR QL: NEGATIVE
ETHANOL UR QL SCN: NEGATIVE
FLUAV RNA RESP QL NAA+PROBE: NEGATIVE
FLUBV RNA RESP QL NAA+PROBE: NEGATIVE
HCG UR QL: NEGATIVE
LABORATORY COMMENT REPORT: NORMAL
OPIATES UR QL SCN: NEGATIVE
RSV RNA SPEC QL NAA+PROBE: NORMAL
SARS-COV-2 RNA RESP QL NAA+PROBE: NEGATIVE
SPECIMEN SOURCE: NORMAL

## 2021-05-01 PROCEDURE — C9803 HOPD COVID-19 SPEC COLLECT: HCPCS

## 2021-05-01 PROCEDURE — 80307 DRUG TEST PRSMV CHEM ANLYZR: CPT | Performed by: EMERGENCY MEDICINE

## 2021-05-01 PROCEDURE — 99285 EMERGENCY DEPT VISIT HI MDM: CPT | Performed by: EMERGENCY MEDICINE

## 2021-05-01 PROCEDURE — 99285 EMERGENCY DEPT VISIT HI MDM: CPT | Mod: 25

## 2021-05-01 PROCEDURE — 80320 DRUG SCREEN QUANTALCOHOLS: CPT | Performed by: EMERGENCY MEDICINE

## 2021-05-01 PROCEDURE — 87636 SARSCOV2 & INF A&B AMP PRB: CPT | Performed by: EMERGENCY MEDICINE

## 2021-05-01 PROCEDURE — 250N000013 HC RX MED GY IP 250 OP 250 PS 637: Performed by: EMERGENCY MEDICINE

## 2021-05-01 PROCEDURE — 90791 PSYCH DIAGNOSTIC EVALUATION: CPT

## 2021-05-01 PROCEDURE — 81025 URINE PREGNANCY TEST: CPT | Performed by: EMERGENCY MEDICINE

## 2021-05-01 RX ORDER — OLANZAPINE 5 MG/1
5 TABLET, ORALLY DISINTEGRATING ORAL ONCE
Status: DISCONTINUED | OUTPATIENT
Start: 2021-05-01 | End: 2021-05-02 | Stop reason: HOSPADM

## 2021-05-01 RX ORDER — OMEPRAZOLE 40 MG/1
40 CAPSULE, DELAYED RELEASE ORAL
COMMUNITY
End: 2023-07-14

## 2021-05-01 RX ORDER — CALCIUM CARBONATE 500 MG/1
500 TABLET, CHEWABLE ORAL ONCE
Status: COMPLETED | OUTPATIENT
Start: 2021-05-01 | End: 2021-05-01

## 2021-05-01 RX ORDER — IBUPROFEN 600 MG/1
600 TABLET, FILM COATED ORAL ONCE
Status: COMPLETED | OUTPATIENT
Start: 2021-05-01 | End: 2021-05-01

## 2021-05-01 RX ORDER — SENNOSIDES 8.6 MG
650 CAPSULE ORAL EVERY 6 HOURS PRN
COMMUNITY
End: 2021-12-13

## 2021-05-01 RX ORDER — MAGNESIUM HYDROXIDE/ALUMINUM HYDROXICE/SIMETHICONE 120; 1200; 1200 MG/30ML; MG/30ML; MG/30ML
15 SUSPENSION ORAL EVERY 6 HOURS PRN
COMMUNITY
End: 2021-12-13

## 2021-05-01 RX ORDER — POLYETHYLENE GLYCOL 3350 17 G/17G
1 POWDER, FOR SOLUTION ORAL DAILY
COMMUNITY
End: 2022-09-19

## 2021-05-01 RX ORDER — NORGESTIMATE AND ETHINYL ESTRADIOL 0.25-0.035
1 KIT ORAL DAILY
COMMUNITY
End: 2022-07-28

## 2021-05-01 RX ORDER — LITHIUM CARBONATE 150 MG/1
150 CAPSULE ORAL AT BEDTIME
Status: ON HOLD | COMMUNITY
End: 2021-07-22

## 2021-05-01 RX ORDER — LITHIUM CARBONATE 150 MG/1
150 CAPSULE ORAL AT BEDTIME
Status: DISCONTINUED | OUTPATIENT
Start: 2021-05-01 | End: 2021-05-02 | Stop reason: HOSPADM

## 2021-05-01 RX ORDER — NALTREXONE HYDROCHLORIDE 50 MG/1
50 TABLET, FILM COATED ORAL DAILY
Status: DISCONTINUED | OUTPATIENT
Start: 2021-05-01 | End: 2021-05-02 | Stop reason: HOSPADM

## 2021-05-01 RX ORDER — TRAZODONE HYDROCHLORIDE 50 MG/1
50 TABLET, FILM COATED ORAL AT BEDTIME
Status: DISCONTINUED | OUTPATIENT
Start: 2021-05-01 | End: 2021-05-02 | Stop reason: HOSPADM

## 2021-05-01 RX ORDER — HYDROXYZINE HYDROCHLORIDE 50 MG/1
50 TABLET, FILM COATED ORAL 2 TIMES DAILY PRN
COMMUNITY

## 2021-05-01 RX ORDER — TRAZODONE HYDROCHLORIDE 50 MG/1
50 TABLET, FILM COATED ORAL AT BEDTIME
COMMUNITY
End: 2021-11-25

## 2021-05-01 RX ORDER — CALCIUM CARBONATE 500 MG/1
1 TABLET, CHEWABLE ORAL 2 TIMES DAILY PRN
COMMUNITY
End: 2024-05-19

## 2021-05-01 RX ORDER — DOCUSATE SODIUM 100 MG/1
200 CAPSULE, LIQUID FILLED ORAL DAILY
COMMUNITY
End: 2022-09-19

## 2021-05-01 RX ORDER — IBUPROFEN 400 MG/1
400 TABLET, FILM COATED ORAL 3 TIMES DAILY PRN
COMMUNITY
End: 2021-12-13

## 2021-05-01 RX ORDER — NALTREXONE HYDROCHLORIDE 50 MG/1
50 TABLET, FILM COATED ORAL AT BEDTIME
COMMUNITY
End: 2022-02-26

## 2021-05-01 RX ORDER — CHLORHEXIDINE GLUCONATE ORAL RINSE 1.2 MG/ML
15 SOLUTION DENTAL 2 TIMES DAILY
COMMUNITY
End: 2022-12-21

## 2021-05-01 RX ADMIN — IBUPROFEN 600 MG: 600 TABLET, FILM COATED ORAL at 20:35

## 2021-05-01 RX ADMIN — IBUPROFEN 600 MG: 600 TABLET, FILM COATED ORAL at 14:49

## 2021-05-01 RX ADMIN — TRAZODONE HYDROCHLORIDE 50 MG: 50 TABLET ORAL at 22:12

## 2021-05-01 RX ADMIN — CALCIUM CARBONATE (ANTACID) CHEW TAB 500 MG 500 MG: 500 CHEW TAB at 18:57

## 2021-05-01 RX ADMIN — LITHIUM CARBONATE 150 MG: 150 CAPSULE, GELATIN COATED ORAL at 22:12

## 2021-05-01 RX ADMIN — NALTREXONE HYDROCHLORIDE 50 MG: 50 TABLET, FILM COATED ORAL at 22:12

## 2021-05-01 ASSESSMENT — ENCOUNTER SYMPTOMS
SHORTNESS OF BREATH: 0
FEVER: 0
NEUROLOGICAL NEGATIVE: 1
ABDOMINAL PAIN: 0
DYSPHORIC MOOD: 1
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
## 2021-05-01 NOTE — ED NOTES
Pt stated that she got discharged from [facility] this morning. She stated that she was thinking of ways of hurting herself while she was in the hospital. Patient stated that she was biting herself and trying to suffocate under the blankets. Patient cycles between crying and laughing. Patient stated that she hasn't slept or eaten in days, but she refuses to take any medication, eat or drink. This writer has tried conversation, iPad, guided imagery, and other DBT coping skills that have helped redirect the patient.

## 2021-05-01 NOTE — TELEPHONE ENCOUNTER
Pt sent to ed by gonzalo.  B: pt worsening depression, SI several days, refusing meds past 3 days. Pt states Si with intent to cut wrists deeply and bleed out.   SIB scratching self, head banging. HI toward a [facility] peer but no plan.  Pt states seeing dad's spirit and states it is in her body, hearing voices. Worsening nightmares.  Pt dad  in February from cancer, BF break up 2 days ago.   A: dx MDD w/psychosis. Cooperative, vol.  R: naegele/4a  Patient cleared and ready for behavioral bed placement: Yes

## 2021-05-01 NOTE — ED NOTES
Rn spoke with pt about admission, pt started crying because she was worried about GH placement. Patient given phone to call mother afterwards

## 2021-05-01 NOTE — ED NOTES
Patient crying and claims she is feels anxious. MD ordered po zyprexa , offered to patient but she refused to take any medications at the moment.

## 2021-05-01 NOTE — ED NOTES
Bed: HW03  Expected date: 5/1/21  Expected time: 11:58 AM  Means of arrival: Ambulance  Comments:  [place] [id number]---20 female cutting sun magdaleno

## 2021-05-01 NOTE — PHARMACY-ADMISSION MEDICATION HISTORY
Admission Medication History Completed by Pharmacy    See [facility] Admission Navigator for allergy information, preferred outpatient pharmacy, prior to admission medications and immunization status.     Medication History Sources:     [facility] [phone]    Arlene RN Admin at  [phone]    Changes made to PTA medication list (reason):    Added: Tums, docusate, lithium, Sprintec, omeprazole, Miralax, trazodone, Tylenol, Maalox, chlorhexidine rinse, ibuprofen    Deleted: olanzapine, quetiapine    Changed:   o Abilify from 10 mg tablets daily to 400 mg injection every 28 days  o Hydroxyzine from 3 times daily prn to every 4 hours prn  o Naltrexone from daily to at bedtime    Additional Information:    Pt has been in and out of various ED in the past week ([facility]),  reports she has refused medications for the past 2+ days.    Prior to Admission medications    Medication Sig Last Dose Taking? Auth Provider   acetaminophen (TYLENOL) 650 MG CR tablet Take 650 mg by mouth every 4 hours as needed for mild pain or fever PRN Yes Reported, Patient   alum & mag hydroxide-simethicone (MAALOX) 200-200-20 MG/5ML SUSP suspension Take 20 mLs by mouth once as needed for indigestion PRN Yes Reported, Patient   ARIPiprazole ER (ABILIFY MAINTENA) 400 MG extended release inj syringe Inject 400 mg into the muscle every 28 days On the 4th of each month 4/4/2021 Yes Reported, Patient   calcium carbonate (TUMS) 500 MG chewable tablet Take 2 chew tab by mouth 4 times daily as needed for heartburn PRN Yes Reported, Patient   chlorhexidine (CHLORHEXIDINE) 0.12 % solution Swish and spit 10 mLs in mouth 2 times daily Past Month at Unknown time Yes Reported, Patient   docusate sodium (COLACE) 100 MG tablet Take 100 mg by mouth 2 times daily Past Week at Unknown time Yes Reported, Patient   hydrOXYzine (ATARAX) 25 MG tablet Take 25-50 mg by mouth every 4 hours as needed for itching PRN Yes Reported, Patient    ibuprofen (ADVIL/MOTRIN) 400 MG tablet Take 400 mg by mouth 3 times daily as needed for moderate pain PRN at Unknown time Yes Reported, Patient   lithium (ESKALITH) 150 MG capsule Take 150 mg by mouth At Bedtime Past Week at Unknown time Yes Reported, Patient   naltrexone (DEPADE/REVIA) 50 MG tablet Take 50 mg by mouth At Bedtime Past Week at Unknown time Yes Reported, Patient   norgestimate-ethinyl estradiol (SPRINTEC 28) 0.25-35 MG-MCG tablet Take 1 tablet by mouth daily Past Week at Unknown time Yes Reported, Patient   omeprazole (PRILOSEC) 40 MG DR capsule Take 40 mg by mouth daily before breakfast Past Week at Unknown time Yes Reported, Patient   polyethylene glycol (MIRALAX) 17 g packet Take 1 packet by mouth daily Past Week at Unknown time Yes Reported, Patient   traZODone (DESYREL) 50 MG tablet Take 50 mg by mouth At Bedtime Past Week at Unknown time Yes Reported, Patient   venlafaxine (EFFEXOR-XR) 150 MG 24 hr capsule Take 2 capsules (300 mg) by mouth daily Past Week at Unknown time Yes Jl Connor MD   Vitamin D, Cholecalciferol, 25 MCG (1000 UT) TABS Take 1,000 Units by mouth daily  Past Week at Unknown time Yes Reported, Patient     Date completed: 05/01/21    Medication history completed by: Tori Méndez

## 2021-05-01 NOTE — SAFE
Sadaf Ross  May 1, 2021    Pt presents to ED by EMS. Endorses SI with plan and intent to scratch, bite, or cut self to bleed out. HI with intent towards group home housemate, denies plans. SIB of cutting, scratching, and head banging. A/V hallucinations, last present today. Denies CD use. Pt is recommended for inpatient admission to stabilize. Pt voluntary.       Current Suicidal Ideation/Self-Injurious Concerns/Methods: Cutting and Hitting/Punching Self    Inappropriate Sexual Behavior: No    Aggression/Homicidal Ideation: Agitation/Hyperactivity, History of Violence and Impaired Self-Control      For additional details see full DEC assessment.       Almita Kumar, [facility]

## 2021-05-02 ENCOUNTER — AMBULATORY - HEALTHEAST (OUTPATIENT)
Dept: CARE COORDINATION | Facility: HOSPITAL | Age: 21
End: 2021-05-02

## 2021-05-02 ENCOUNTER — TELEPHONE (OUTPATIENT)
Dept: BEHAVIORAL HEALTH | Facility: CLINIC | Age: 21
End: 2021-05-02

## 2021-05-02 VITALS
TEMPERATURE: 97.2 F | HEART RATE: 95 BPM | SYSTOLIC BLOOD PRESSURE: 140 MMHG | BODY MASS INDEX: 39.95 KG/M2 | RESPIRATION RATE: 18 BRPM | DIASTOLIC BLOOD PRESSURE: 83 MMHG | WEIGHT: 234 LBS | OXYGEN SATURATION: 100 % | HEIGHT: 64 IN

## 2021-05-02 PROCEDURE — 250N000011 HC RX IP 250 OP 636: Performed by: EMERGENCY MEDICINE

## 2021-05-02 RX ORDER — ONDANSETRON 4 MG/1
4 TABLET, ORALLY DISINTEGRATING ORAL ONCE
Status: COMPLETED | OUTPATIENT
Start: 2021-05-02 | End: 2021-05-02

## 2021-05-02 RX ADMIN — ONDANSETRON 4 MG: 4 TABLET, ORALLY DISINTEGRATING ORAL at 04:30

## 2021-05-02 ASSESSMENT — MIFFLIN-ST. JEOR: SCORE: 1811.42

## 2021-05-02 NOTE — ED PROVIDER NOTES
[facility] ED Mental Health Handoff Note:       Brief HPI:  This is a 20 year old female signed out to me by Dr. Duggan.  See initial ED Provider note (Dr. Younger) for full details of the presentation. Interval history is pertinent for no acute events.    Sadaf Ross is a 20 year old female with a past medical history of MDD with psychosis, borderline personality disorder, ADHD and intellectual disability who [facility] for evaluation of suicidal ideation and self injurious behavior.    Home meds reviewed and ordered/administered: Yes    Medically stable for inpatient mental health admission: Yes.    Evaluated by mental health: Yes. The recommendation is for inpatient mental health treatment. Bed search in process    Safety concerns: At the time I received sign out, there were no safety concerns.    Hold Status:  Active Orders   N/A       Exam:   Patient Vitals for the past 24 hrs:   BP Temp Temp src Pulse Resp SpO2   05/01/21 2219 132/65 98  F (36.7  C) Oral 78 16 98 %   05/01/21 1236 135/86 98.2  F (36.8  C) Oral 94 16 100 %         ED Course:    Medications   OLANZapine zydis (zyPREXA) ODT tab 5 mg (5 mg Oral Not Given 5/1/21 2148)   lithium (ESKALITH) capsule 150 mg (150 mg Oral Given 5/1/21 2212)   traZODone (DESYREL) tablet 50 mg (50 mg Oral Given 5/1/21 2212)   naltrexone (DEPADE/REVIA) tablet 50 mg (50 mg Oral Given 5/1/21 2212)   ibuprofen (ADVIL/MOTRIN) tablet 600 mg (600 mg Oral Given 5/1/21 1449)   calcium carbonate (TUMS) chewable tablet 500 mg (500 mg Oral Given 5/1/21 1857)   ibuprofen (ADVIL/MOTRIN) tablet 600 mg (600 mg Oral Given 5/1/21 2035)   ondansetron (ZOFRAN-ODT) ODT tab 4 mg (4 mg Oral Given 5/2/21 0430)            There were no significant events during my shift.    Patient was signed out to the oncoming provider, Dr. Mo      Impression:    ICD-10-CM    1. Suicidal ideation  R45.851 Asymptomatic Influenza A/B & SARS-CoV2 (COVID-19) Virus PCR  Multiplex     Drug abuse screen 6 urine (chem dep)     HCG qualitative urine     HCG qualitative urine     CANCELED: HCG qualitative pregnancy (blood)   2. Suicidal behavior with attempted self-injury (H)  T14.91XA    3. Anxiety and depression  F41.9     F32.9        Plan:    1. Awaiting inpatient mental health admission/transfer.      RESULTS:   Results for orders placed or performed during the hospital encounter of 05/01/21 (from the past 24 hour(s))   Drug abuse screen 6 urine (chem dep)     Status: None    Collection Time: 05/01/21  3:27 PM   Result Value Ref Range    Amphetamine Qual Urine Negative NEG^Negative    Barbiturates Qual Urine Negative NEG^Negative    Benzodiazepine Qual Urine Negative NEG^Negative    Cannabinoids Qual Urine Negative NEG^Negative    Cocaine Qual Urine Negative NEG^Negative    Ethanol Qual Urine Negative NEG^Negative    Opiates Qualitative Urine Negative NEG^Negative   HCG qualitative urine     Status: None    Collection Time: 05/01/21  3:27 PM   Result Value Ref Range    HCG Qual Urine Negative NEG^Negative   Asymptomatic Influenza A/B & SARS-CoV2 (COVID-19) Virus PCR Multiplex     Status: None    Collection Time: 05/01/21  3:31 PM    Specimen: Nasopharyngeal   Result Value Ref Range    Flu A/B & SARS-COV-2 PCR Source Nasopharyngeal     SARS-CoV-2 PCR Result NEGATIVE     Influenza A PCR Negative NEG^Negative    Influenza B PCR Negative NEG^Negative    Respiratory Syncytial Virus PCR (Note)     Flu A/B & SARS-CoV-2 PCR Comment (Note)          MD Nick King Linsey Gail, MD  05/02/21 0138

## 2021-05-08 ENCOUNTER — MEDICAL CORRESPONDENCE (OUTPATIENT)
Dept: HEALTH INFORMATION MANAGEMENT | Facility: CLINIC | Age: 21
End: 2021-05-08

## 2021-05-11 ENCOUNTER — COMMUNICATION - HEALTHEAST (OUTPATIENT)
Dept: SCHEDULING | Facility: CLINIC | Age: 21
End: 2021-05-11

## 2021-05-17 ENCOUNTER — DOCUMENTATION ONLY (OUTPATIENT)
Dept: OTHER | Facility: CLINIC | Age: 21
End: 2021-05-17

## 2021-05-18 ENCOUNTER — HOSPITAL ENCOUNTER (EMERGENCY)
Facility: CLINIC | Age: 21
Discharge: NURSING, REHAB OR LONG-TERM CARE | End: 2021-05-19
Attending: EMERGENCY MEDICINE | Admitting: EMERGENCY MEDICINE
Payer: MEDICARE

## 2021-05-18 DIAGNOSIS — R45.851 VERBALIZES SUICIDAL THOUGHTS: ICD-10-CM

## 2021-05-18 PROCEDURE — 90791 PSYCH DIAGNOSTIC EVALUATION: CPT

## 2021-05-18 PROCEDURE — 99285 EMERGENCY DEPT VISIT HI MDM: CPT | Mod: 25 | Performed by: EMERGENCY MEDICINE

## 2021-05-18 PROCEDURE — 99284 EMERGENCY DEPT VISIT MOD MDM: CPT | Performed by: EMERGENCY MEDICINE

## 2021-05-19 VITALS
DIASTOLIC BLOOD PRESSURE: 80 MMHG | BODY MASS INDEX: 39.99 KG/M2 | OXYGEN SATURATION: 100 % | RESPIRATION RATE: 16 BRPM | WEIGHT: 233 LBS | TEMPERATURE: 97.3 F | SYSTOLIC BLOOD PRESSURE: 116 MMHG | HEART RATE: 70 BPM

## 2021-05-19 PROCEDURE — 250N000013 HC RX MED GY IP 250 OP 250 PS 637: Performed by: FAMILY MEDICINE

## 2021-05-19 RX ORDER — VENLAFAXINE HYDROCHLORIDE 150 MG/1
300 CAPSULE, EXTENDED RELEASE ORAL DAILY
Status: DISCONTINUED | OUTPATIENT
Start: 2021-05-19 | End: 2021-05-19 | Stop reason: HOSPADM

## 2021-05-19 RX ADMIN — VENLAFAXINE HYDROCHLORIDE 300 MG: 150 CAPSULE, EXTENDED RELEASE ORAL at 08:28
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
## 2021-05-19 NOTE — ED NOTES
Bed: HW01  Expected date:   Expected time:   Means of arrival:   Comments:  [place] [age] y/o F SI

## 2021-05-19 NOTE — ED NOTES
Sign out Provider: Joaquin      Sign out Plan: Was seen and evaluated on the prior shift, was recently admitted and discharged back to her group home but presented yesterday with a recurrence of chronic suicidal thoughts.  Plan was to hold in the ED, as group home was planning on increasing staffing for this patient to 1 and 1.  At the time of signout the plan was the patient will be discharged once the plan was confirmed with the group home.      Reassessment: Patient was advised by nursing of the decision to discharge her, was reticent but agreed and will be transported back to her group home as per the plan outlined by the previous physician and mental health .      Disposition: Discharged to group home.       Leo Lowe MD  05/19/21 0714

## 2021-05-19 NOTE — ED NOTES
Patient discharged to group home, all instructions reviewed, all questions answered, patient was agreeable and cooperate with discharge and transport with EMS.

## 2021-05-19 NOTE — ED NOTES
Talked to [facility] staff and discussed the discharge plan, [facility] is ready to take the patient back to [facility]. Staff told writer ok to send the patient through cab.

## 2021-05-19 NOTE — ED NOTES
Ordered medical ride for patient RN/writer feels patient is vulnerable and not appropriate for cab ride.

## 2021-05-21 ENCOUNTER — HOSPITAL ENCOUNTER (EMERGENCY)
Facility: CLINIC | Age: 21
Discharge: HOME | End: 2021-05-22
Attending: INTERNAL MEDICINE
Payer: MEDICARE

## 2021-05-21 ENCOUNTER — HOSPITAL ENCOUNTER (EMERGENCY)
Facility: CLINIC | Age: 21
Discharge: NURSING, REHAB OR LONG-TERM CARE | End: 2021-05-21
Attending: INTERNAL MEDICINE | Admitting: INTERNAL MEDICINE
Payer: MEDICARE

## 2021-05-21 ENCOUNTER — MEDICAL CORRESPONDENCE (OUTPATIENT)
Dept: HEALTH INFORMATION MANAGEMENT | Facility: CLINIC | Age: 21
End: 2021-05-21

## 2021-05-21 VITALS
RESPIRATION RATE: 16 BRPM | HEART RATE: 99 BPM | SYSTOLIC BLOOD PRESSURE: 116 MMHG | DIASTOLIC BLOOD PRESSURE: 92 MMHG | OXYGEN SATURATION: 98 %

## 2021-05-21 DIAGNOSIS — F60.3 EXPLOSIVE PERSONALITY DISORDER (H): ICD-10-CM

## 2021-05-21 DIAGNOSIS — F33.3 SEVERE RECURRENT MAJOR DEPRESSION WITH PSYCHOTIC FEATURES (H): ICD-10-CM

## 2021-05-21 DIAGNOSIS — F60.3 BORDERLINE PERSONALITY DISORDER (H): ICD-10-CM

## 2021-05-21 DIAGNOSIS — F70 MILD INTELLECTUAL DISABILITIES: ICD-10-CM

## 2021-05-21 DIAGNOSIS — R45.851 SUICIDAL IDEATION: ICD-10-CM

## 2021-05-21 DIAGNOSIS — F41.9 ANXIETY: ICD-10-CM

## 2021-05-21 DIAGNOSIS — F90.0 ATTENTION DEFICIT HYPERACTIVITY DISORDER, INATTENTIVE TYPE: ICD-10-CM

## 2021-05-21 PROCEDURE — 90791 PSYCH DIAGNOSTIC EVALUATION: CPT

## 2021-05-21 PROCEDURE — 99285 EMERGENCY DEPT VISIT HI MDM: CPT | Mod: 25

## 2021-05-21 PROCEDURE — 80320 DRUG SCREEN QUANTALCOHOLS: CPT | Performed by: EMERGENCY MEDICINE

## 2021-05-21 PROCEDURE — 99284 EMERGENCY DEPT VISIT MOD MDM: CPT | Performed by: INTERNAL MEDICINE

## 2021-05-21 PROCEDURE — 80307 DRUG TEST PRSMV CHEM ANLYZR: CPT | Performed by: EMERGENCY MEDICINE

## 2021-05-21 PROCEDURE — 81025 URINE PREGNANCY TEST: CPT | Performed by: EMERGENCY MEDICINE

## 2021-05-21 PROCEDURE — 99285 EMERGENCY DEPT VISIT HI MDM: CPT | Mod: 25,27 | Performed by: INTERNAL MEDICINE

## 2021-05-21 ASSESSMENT — ENCOUNTER SYMPTOMS
SHORTNESS OF BREATH: 0
EYE REDNESS: 0
DIFFICULTY URINATING: 0
ABDOMINAL PAIN: 0
ABDOMINAL PAIN: 0
ARTHRALGIAS: 0
HEADACHES: 0
SHORTNESS OF BREATH: 0
HALLUCINATIONS: 1
FEVER: 0
CONFUSION: 0
COLOR CHANGE: 0
FEVER: 0
NECK STIFFNESS: 0

## 2021-05-21 NOTE — ED NOTES
Bed: HW03  Expected date: 5/21/21  Expected time: 3:38 PM  Means of arrival:   Comments:  [id number] 19yo F hallucinations
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
## 2021-05-21 NOTE — ED NOTES
Group home states that pt is transported via taxi back to group home. Pending  to talk with MD and then pt to discharge back to group home.

## 2021-05-21 NOTE — ED PROVIDER NOTES
ED Provider Note  [facility]      History     Chief Complaint   Patient presents with     Hallucinations     Pt coming from , Hx auditory hallucination and today she is hearing new voices telling her to kill herself but not how.     HPI  Sadaf Ross is a 20 year old female with a medical history significant for borderline personality disorder and adjustment disorder who was seen in the Emergency Department here on 5/18/2021 for auditory hallucinations and suicidal ideation.  Patient has had multiple mental health admissions, but these have not been particularly beneficial for the patient.  After the patient was last seen here in the Emergency Department arrangement was made for extensive mental health care at the patient's group home.  Today, the patient called 911 to be brought here to the Emergency Department and did not communicate this with the group home.  Patient reports that she is having ongoing auditory hallucinations which are telling her to kill her self.  These are chronic hallucinations and not changed since she was last evaluated.  The DEC  did evaluate the patient and they felt that there were no significant changes today compared to when she was last seen.    Please see DEC 's note in Epic dated 05/21/21 for further details.    Past Medical History  Past Medical History:   Diagnosis Date     ADHD (attention deficit hyperactivity disorder)      Bipolar 1 disorder (H)      Depressive disorder      Past Surgical History:   Procedure Laterality Date     APPENDECTOMY       acetaminophen (TYLENOL) 650 MG CR tablet  alum & mag hydroxide-simethicone (MAALOX) 200-200-20 MG/5ML SUSP suspension  ARIPiprazole ER (ABILIFY MAINTENA) 400 MG extended release inj syringe  calcium carbonate (TUMS) 500 MG chewable tablet  chlorhexidine (CHLORHEXIDINE) 0.12 % solution  docusate sodium (COLACE) 100 MG tablet  hydrOXYzine (ATARAX) 25 MG tablet  ibuprofen (ADVIL/MOTRIN)  400 MG tablet  lithium (ESKALITH) 150 MG capsule  naltrexone (DEPADE/REVIA) 50 MG tablet  norgestimate-ethinyl estradiol (SPRINTEC 28) 0.25-35 MG-MCG tablet  omeprazole (PRILOSEC) 40 MG DR capsule  polyethylene glycol (MIRALAX) 17 g packet  traZODone (DESYREL) 50 MG tablet  venlafaxine (EFFEXOR-XR) 150 MG 24 hr capsule  Vitamin D, Cholecalciferol, 25 MCG (1000 UT) TABS      Allergies   Allergen Reactions     Penicillins Rash and Unknown     Family History  History reviewed. No pertinent family history.  Social History   Social History     Tobacco Use     Smoking status: Current Some Day Smoker     Years: 5.00     Types: Cigarettes     Smokeless tobacco: Never Used   Substance Use Topics     Alcohol use: No     Drug use: No      Past medical history, past surgical history, medications, allergies, family history, and social history were reviewed with the patient. No additional pertinent items.       Review of Systems   Constitutional: Negative for fever.   HENT: Negative for congestion.    Eyes: Negative for redness.   Respiratory: Negative for shortness of breath.    Cardiovascular: Negative for chest pain.   Gastrointestinal: Negative for abdominal pain.   Genitourinary: Negative for difficulty urinating.   Musculoskeletal: Negative for arthralgias and neck stiffness.   Skin: Negative for color change.   Neurological: Negative for headaches.   Psychiatric/Behavioral: Positive for hallucinations and suicidal ideas. Negative for confusion.   All other systems reviewed and are negative.      Physical Exam      Physical Exam  Constitutional:       General: She is not in acute distress.     Appearance: She is not diaphoretic.   HENT:      Head: Atraumatic.      Mouth/Throat:      Pharynx: No oropharyngeal exudate.   Eyes:      General: No scleral icterus.     Pupils: Pupils are equal, round, and reactive to light.   Cardiovascular:      Heart sounds: Normal heart sounds.   Pulmonary:      Effort: No respiratory distress.       Breath sounds: Normal breath sounds.   Abdominal:      General: Bowel sounds are normal.      Palpations: Abdomen is soft.      Tenderness: There is no abdominal tenderness.   Musculoskeletal:         General: No tenderness.   Skin:     General: Skin is warm.      Findings: No rash.   Neurological:      General: No focal deficit present.      Cranial Nerves: No cranial nerve deficit.   Psychiatric:         Attention and Perception: Attention and perception normal.         Mood and Affect: Mood is anxious and depressed. Affect is blunt and flat.         Speech: Speech normal.         Behavior: Behavior is slowed and withdrawn.         Thought Content: Thought content is delusional. Thought content is not paranoid. Thought content includes suicidal ideation. Thought content does not include homicidal ideation. Thought content does not include homicidal or suicidal plan.         ED Course      Procedures               Results for orders placed or performed during the hospital encounter of 05/21/21   Drug abuse screen 6 urine (tox)     Status: None   Result Value Ref Range    Amphetamine Qual Urine Negative NEG^Negative    Barbiturates Qual Urine Negative NEG^Negative    Benzodiazepine Qual Urine Negative NEG^Negative    Cannabinoids Qual Urine Negative NEG^Negative    Cocaine Qual Urine Negative NEG^Negative    Ethanol Qual Urine Negative NEG^Negative    Opiates Qualitative Urine Negative NEG^Negative   HCG qualitative urine     Status: None   Result Value Ref Range    HCG Qual Urine Negative NEG^Negative     Medications - No data to display     Assessments & Plan (with Medical Decision Making)  Borderline personality disorder with chronic AH and SI, recent MH admission-deem to try to manage with outpatient managements, please see the  Giancarlo's note for details, essentially no acute findings but chronic SI and AH, continue to monitor 1:1 at Group Home and other MH managements.       I have reviewed the  nursing notes. I have reviewed the findings, diagnosis, plan and need for follow up with the patient.    Discharge Medication List as of 5/21/2021  6:39 PM          Final diagnoses:   Borderline personality disorder (H)   Suicidal ideation, chronic       --  IViktor, am serving as a trained medical scribe to document services personally performed by Florina Dudley MD, based on the provider's statements to me.     IFlorina MD, was physically present and have reviewed and verified the accuracy of this note documented by Viktor Hamm.    Florina Dudley MD  [facility] EMERGENCY DEPARTMENT  5/21/2021     Florina Dudley MD  05/21/21 9886

## 2021-05-21 NOTE — DISCHARGE INSTRUCTIONS
Please continue to monitor at the Group home and make an appointment to follow up with Mental Health Providers as soon as possible even if entirely better.

## 2021-05-22 ENCOUNTER — NURSE TRIAGE (OUTPATIENT)
Dept: NURSING | Facility: CLINIC | Age: 21
End: 2021-05-22

## 2021-05-22 ENCOUNTER — HOSPITAL ENCOUNTER (EMERGENCY)
Facility: CLINIC | Age: 21
Discharge: NURSING, REHAB OR LONG-TERM CARE | End: 2021-05-23
Attending: EMERGENCY MEDICINE | Admitting: EMERGENCY MEDICINE
Payer: MEDICARE

## 2021-05-22 VITALS
DIASTOLIC BLOOD PRESSURE: 67 MMHG | HEART RATE: 88 BPM | RESPIRATION RATE: 14 BRPM | BODY MASS INDEX: 39.73 KG/M2 | TEMPERATURE: 98.7 F | WEIGHT: 231.48 LBS | SYSTOLIC BLOOD PRESSURE: 119 MMHG | OXYGEN SATURATION: 98 %

## 2021-05-22 DIAGNOSIS — R45.851 SUICIDAL THOUGHTS: ICD-10-CM

## 2021-05-22 PROCEDURE — 99284 EMERGENCY DEPT VISIT MOD MDM: CPT | Performed by: EMERGENCY MEDICINE

## 2021-05-22 PROCEDURE — 99283 EMERGENCY DEPT VISIT LOW MDM: CPT | Performed by: EMERGENCY MEDICINE

## 2021-05-22 ASSESSMENT — ENCOUNTER SYMPTOMS
ABDOMINAL PAIN: 0
NECK STIFFNESS: 0
VOMITING: 0
DIFFICULTY URINATING: 0
DIFFICULTY URINATING: 0
SHORTNESS OF BREATH: 0
NERVOUS/ANXIOUS: 1
NECK PAIN: 0
NAUSEA: 0
HEADACHES: 0
FEVER: 0
CONFUSION: 0
COLOR CHANGE: 0
EYE REDNESS: 0
HEADACHES: 0
BACK PAIN: 0
ARTHRALGIAS: 0
CHILLS: 0

## 2021-05-22 NOTE — DISCHARGE INSTRUCTIONS
Please go back to your Group home and continue to be monitored and follow up with Your Mental Health Providers as soon as possible even if entirely better.

## 2021-05-22 NOTE — ED NOTES
Spoke with Arlene Casey at [facility] who agrees with plan to discharge patient back home and states a taxi was situated for her during her earlier ER visit and would like her to be sent in the taxi.

## 2021-05-22 NOTE — ED NOTES
Bed: HW02  Expected date: 5/21/21  Expected time: 10:12 PM  Means of arrival: Ambulance  Comments:  [id number]  22F  EFRAIN magdaleno

## 2021-05-22 NOTE — ED PROVIDER NOTES
ED Provider Note  [facility]      History     Chief Complaint   Patient presents with     Anxiety     pt seen here earlier today, and discharged from the ED, back at group home, pt was anxious did not feel herself and asked to come back, NO si     HPI  Sadaf Ross is a 20 year old female with a medical history significant for borderline personality disorder and adjustment disorder who was seen in the Emergency Department earlier today by me. Patient had been seen for auditory hallucinations and suicidal ideation. There were no acute findings earlier today and patient was discharged back to her group home.    Patient returns to the Emergency Department now after calling 911 again from her group home. Patient reports that she went home after being discharged from the Emergency Department and visited with her sister. Patient and her sister got into a verbal argument, so the sister left. The patient got upset and called [facility]. After calling and talking with them, the patient then called 911 from the group home. Patient presents now for evaluation. Patient has ongoing suicidal thoughts that are at their baseline, she denies any changes in them. Patient denies any intent. Patient's group home is currently working on getting one-to-one staffing set up for the patient.    Past Medical History  Past Medical History:   Diagnosis Date     ADHD (attention deficit hyperactivity disorder)      Bipolar 1 disorder (H)      Depressive disorder      Past Surgical History:   Procedure Laterality Date     APPENDECTOMY       acetaminophen (TYLENOL) 650 MG CR tablet  alum & mag hydroxide-simethicone (MAALOX) 200-200-20 MG/5ML SUSP suspension  ARIPiprazole ER (ABILIFY MAINTENA) 400 MG extended release inj syringe  calcium carbonate (TUMS) 500 MG chewable tablet  chlorhexidine (CHLORHEXIDINE) 0.12 % solution  docusate sodium (COLACE) 100 MG tablet  hydrOXYzine (ATARAX) 25 MG tablet  ibuprofen (ADVIL/MOTRIN)  400 MG tablet  lithium (ESKALITH) 150 MG capsule  naltrexone (DEPADE/REVIA) 50 MG tablet  norgestimate-ethinyl estradiol (SPRINTEC 28) 0.25-35 MG-MCG tablet  omeprazole (PRILOSEC) 40 MG DR capsule  polyethylene glycol (MIRALAX) 17 g packet  traZODone (DESYREL) 50 MG tablet  venlafaxine (EFFEXOR-XR) 150 MG 24 hr capsule  Vitamin D, Cholecalciferol, 25 MCG (1000 UT) TABS      Allergies   Allergen Reactions     Penicillins Rash and Unknown     Family History  No family history on file.  Social History   Social History     Tobacco Use     Smoking status: Current Some Day Smoker     Years: 5.00     Types: Cigarettes     Smokeless tobacco: Never Used   Substance Use Topics     Alcohol use: No     Drug use: No      Past medical history, past surgical history, medications, allergies, family history, and social history were reviewed with the patient. No additional pertinent items.       Review of Systems   Constitutional: Negative for fever.   HENT: Negative for congestion.    Eyes: Negative for redness.   Respiratory: Negative for shortness of breath.    Cardiovascular: Negative for chest pain.   Gastrointestinal: Negative for abdominal pain.   Genitourinary: Negative for difficulty urinating.   Musculoskeletal: Negative for arthralgias and neck stiffness.   Skin: Negative for color change.   Neurological: Negative for headaches.   Psychiatric/Behavioral: Positive for suicidal ideas (chronic, unchanged). Negative for confusion and self-injury.   All other systems reviewed and are negative.        Physical Exam   BP: 117/82  Pulse: 101  Temp: 98.7  F (37.1  C)  Resp: 14  Weight: 105 kg (231 lb 7.7 oz)  SpO2: 98 %  Physical Exam  Constitutional:       General: She is not in acute distress.     Appearance: She is not diaphoretic.   HENT:      Head: Atraumatic.      Mouth/Throat:      Pharynx: No oropharyngeal exudate.   Eyes:      General: No scleral icterus.     Pupils: Pupils are equal, round, and reactive to light.   Neck:       Musculoskeletal: Neck supple.   Cardiovascular:      Rate and Rhythm: Normal rate and regular rhythm.      Heart sounds: Normal heart sounds. No murmur. No friction rub. No gallop.    Pulmonary:      Effort: Pulmonary effort is normal. No respiratory distress.      Breath sounds: Normal breath sounds. No stridor. No wheezing, rhonchi or rales.   Chest:      Chest wall: No tenderness.   Abdominal:      General: Abdomen is flat. Bowel sounds are normal. There is no distension.      Palpations: Abdomen is soft. There is no mass.      Tenderness: There is no abdominal tenderness. There is no right CVA tenderness, left CVA tenderness, guarding or rebound.      Hernia: No hernia is present.   Musculoskeletal:         General: No tenderness.   Skin:     General: Skin is warm.      Findings: No rash.   Neurological:      General: No focal deficit present.      Cranial Nerves: No cranial nerve deficit.   Psychiatric:         Attention and Perception: Attention normal.         Mood and Affect: Affect is blunt and flat.         Speech: Speech normal.         Behavior: Behavior normal.         Thought Content: Thought content is delusional. Thought content is not paranoid. Thought content includes suicidal ideation. Thought content does not include homicidal ideation. Thought content does not include homicidal or suicidal plan.         ED Course      Procedures               No results found for any visits on 05/21/21.  Medications - No data to display     Assessments & Plan (with Medical Decision Making)  An episode of anxiety in the pt with borderline personality, chronic AH and SI, just evaluated here earlier today and came back after visit with her sister at the Group home. Please refer to the  Emili's note for details, but felt baseline, no imminent danger to self or others, contacted the Group home and ok with taking her back with previous arrangement of monitoring, will discharge and follow up with her  Mental Health Providers.       I have reviewed the nursing notes. I have reviewed the findings, diagnosis, plan and need for follow up with the patient.    New Prescriptions    No medications on file       Final diagnoses:   Anxiety   Borderline personality disorder (H)       --  I, Viktor Hamm, am serving as a trained medical scribe to document services personally performed by Florina Dudley MD, based on the provider's statements to me.     I, Florina Dudley MD, was physically present and have reviewed and verified the accuracy of this note documented by Viktor Hamm.    Florina Dudley MD  [facility] EMERGENCY DEPARTMENT  5/21/2021     Florina Dudley MD  05/22/21 0045

## 2021-05-23 ENCOUNTER — HOSPITAL ENCOUNTER (EMERGENCY)
Facility: CLINIC | Age: 21
Discharge: HOME | End: 2021-05-23
Attending: PSYCHIATRY & NEUROLOGY | Admitting: PSYCHIATRY & NEUROLOGY
Payer: MEDICARE

## 2021-05-23 ENCOUNTER — TELEPHONE (OUTPATIENT)
Dept: BEHAVIORAL HEALTH | Facility: CLINIC | Age: 21
End: 2021-05-23

## 2021-05-23 ENCOUNTER — NURSE TRIAGE (OUTPATIENT)
Dept: NURSING | Facility: CLINIC | Age: 21
End: 2021-05-23

## 2021-05-23 VITALS
HEART RATE: 75 BPM | RESPIRATION RATE: 16 BRPM | DIASTOLIC BLOOD PRESSURE: 65 MMHG | TEMPERATURE: 98.1 F | OXYGEN SATURATION: 98 % | SYSTOLIC BLOOD PRESSURE: 109 MMHG

## 2021-05-23 VITALS
SYSTOLIC BLOOD PRESSURE: 123 MMHG | RESPIRATION RATE: 14 BRPM | OXYGEN SATURATION: 59 % | BODY MASS INDEX: 38.72 KG/M2 | WEIGHT: 225.6 LBS | TEMPERATURE: 95.8 F | DIASTOLIC BLOOD PRESSURE: 80 MMHG | HEART RATE: 59 BPM

## 2021-05-23 DIAGNOSIS — F41.9 ANXIETY: ICD-10-CM

## 2021-05-23 DIAGNOSIS — F60.3 BORDERLINE PERSONALITY DISORDER (H): ICD-10-CM

## 2021-05-23 PROCEDURE — 99285 EMERGENCY DEPT VISIT HI MDM: CPT | Mod: 25 | Performed by: PSYCHIATRY & NEUROLOGY

## 2021-05-23 PROCEDURE — 90791 PSYCH DIAGNOSTIC EVALUATION: CPT

## 2021-05-23 PROCEDURE — 250N000013 HC RX MED GY IP 250 OP 250 PS 637: Performed by: EMERGENCY MEDICINE

## 2021-05-23 PROCEDURE — 99284 EMERGENCY DEPT VISIT MOD MDM: CPT | Performed by: PSYCHIATRY & NEUROLOGY

## 2021-05-23 PROCEDURE — 250N000013 HC RX MED GY IP 250 OP 250 PS 637: Performed by: PSYCHIATRY & NEUROLOGY

## 2021-05-23 RX ORDER — LITHIUM CARBONATE 150 MG/1
150 CAPSULE ORAL AT BEDTIME
Status: DISCONTINUED | OUTPATIENT
Start: 2021-05-23 | End: 2021-05-23 | Stop reason: HOSPADM

## 2021-05-23 RX ORDER — OLANZAPINE 5 MG/1
5 TABLET, ORALLY DISINTEGRATING ORAL ONCE
Status: COMPLETED | OUTPATIENT
Start: 2021-05-23 | End: 2021-05-23

## 2021-05-23 RX ORDER — OLANZAPINE 10 MG/1
10 TABLET, ORALLY DISINTEGRATING ORAL ONCE
Status: COMPLETED | OUTPATIENT
Start: 2021-05-23 | End: 2021-05-23

## 2021-05-23 RX ADMIN — LITHIUM CARBONATE 150 MG: 150 CAPSULE, GELATIN COATED ORAL at 00:45

## 2021-05-23 RX ADMIN — OLANZAPINE 10 MG: 10 TABLET, ORALLY DISINTEGRATING ORAL at 00:39

## 2021-05-23 RX ADMIN — OLANZAPINE 5 MG: 5 TABLET, ORALLY DISINTEGRATING ORAL at 17:01

## 2021-05-23 ASSESSMENT — ENCOUNTER SYMPTOMS
GASTROINTESTINAL NEGATIVE: 1
CARDIOVASCULAR NEGATIVE: 1
RESPIRATORY NEGATIVE: 1
NEUROLOGICAL NEGATIVE: 1
EYES NEGATIVE: 1
NERVOUS/ANXIOUS: 1
MUSCULOSKELETAL NEGATIVE: 1
DECREASED CONCENTRATION: 1
CONSTITUTIONAL NEGATIVE: 1
HALLUCINATIONS: 0
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
## 2021-05-23 NOTE — ED NOTES
Pt seen by the DEC  and the ED provider.  Pt given PO med to help with her anxiety.  Pt to be monitored for effect and then be discharge back to the  were she resides at or around 18:00

## 2021-05-23 NOTE — DISCHARGE INSTRUCTIONS
Please make an appointment to follow up with your therapist and/or Psychiatric Clinic (phone: [phone]) within 1-3 days.     Return to the ED if you are having worsening thoughts/feelings of harming yourself or others, or any urgent/life-threatening concerns.     DISCHARGE RESOURCES:  -[facility] [phone]   -[facility] Behavioral Intake [phone] or [phone].  -Crisis Intervention: 204.545.8913 or 261-165-7702 (TTY: 963.144.4015).  Call anytime.  -Suicide Awareness Voices of Education (SAVE) (www.save.org): 562-444-DSGS (4582)  -National Suicide Prevention Line (www.mentalhealthmn.org): 420-764-XKTU (9066)  -National [place] on Mental Illness (www.mn.celine.org): 749.774.8693 or 630-774-3424.  -Pxgm4iogb: text the word LIFE to 70018 for immediate support and crisis intervention  -Mental Health Consumer/Survivor Network of [place] (www.mhcsn.net): 236.509.6675 or 628-754-8881  -Mental Health Association of [place] (www.mentalhealth.org): 932.910.9615 or 947-997-7587

## 2021-05-23 NOTE — ED PROVIDER NOTES
[facility] EMERGENCY DEPARTMENT ([facility])  5/23/21    History     Chief Complaint   Patient presents with     Mental Health Problem     Pt d/c 0300 today, pt reports wanting to bite herself, she reports coming back because she isn't feeling better     The history is provided by the patient and medical records.   Mental Health Problem  Presenting symptoms: self-mutilation    Presenting symptoms: no hallucinations and no suicidal thoughts    Associated symptoms: anxiety      Sadaf Ross is a 20 year old female with a medical history significant for ADHD, bipolar 1 disorder, MDD, borderline personality disorder, and adjustment disorder who presents to the emergency department for mental health evaluation. Patient was discharged at 0300 today but has returned because she is not feeling better. She reports wanting to bite herself. She lives in a group home. She has low frustration tolerance and been overwhelmed with conflict with a peer in the group home. She spent 19 days this month inpatient as she engages in self-injury and threatening suicide. Since discharge 5 days ago, she has been seen in an ED past 3 days, with back to back visit in the same day. She is here, now feeling calm and in emotional and behavioral control. She reports that peer resident will be transferred out soon. She feels safe returning to her group home.     Please see DEC Crisis Assessment on 5/23/21 in Epic for further details.    Past Medical History:   Diagnosis Date     ADHD (attention deficit hyperactivity disorder)      Bipolar 1 disorder (H)      Depressive disorder        Past Surgical History:   Procedure Laterality Date     APPENDECTOMY         No family history on file.    Social History     Tobacco Use     Smoking status: Current Some Day Smoker     Years: 5.00     Types: Cigarettes     Smokeless tobacco: Never Used   Substance Use Topics     Alcohol use: No       Current Facility-Administered Medications   Medication      OLANZapine zydis (zyPREXA) ODT tab 5 mg     Current Outpatient Medications   Medication     acetaminophen (TYLENOL) 650 MG CR tablet     alum & mag hydroxide-simethicone (MAALOX) 200-200-20 MG/5ML SUSP suspension     ARIPiprazole ER (ABILIFY MAINTENA) 400 MG extended release inj syringe     calcium carbonate (TUMS) 500 MG chewable tablet     chlorhexidine (CHLORHEXIDINE) 0.12 % solution     docusate sodium (COLACE) 100 MG tablet     hydrOXYzine (ATARAX) 25 MG tablet     ibuprofen (ADVIL/MOTRIN) 400 MG tablet     lithium (ESKALITH) 150 MG capsule     naltrexone (DEPADE/REVIA) 50 MG tablet     norgestimate-ethinyl estradiol (SPRINTEC 28) 0.25-35 MG-MCG tablet     omeprazole (PRILOSEC) 40 MG DR capsule     polyethylene glycol (MIRALAX) 17 g packet     traZODone (DESYREL) 50 MG tablet     venlafaxine (EFFEXOR-XR) 150 MG 24 hr capsule     Vitamin D, Cholecalciferol, 25 MCG (1000 UT) TABS        Allergies   Allergen Reactions     Penicillins Rash and Unknown        Review of Systems   Constitutional: Negative.    HENT: Negative.    Eyes: Negative.    Respiratory: Negative.    Cardiovascular: Negative.    Gastrointestinal: Negative.    Genitourinary: Negative.    Musculoskeletal: Negative.    Skin: Negative.    Neurological: Negative.    Psychiatric/Behavioral: Positive for behavioral problems, decreased concentration and self-injury. Negative for hallucinations and suicidal ideas. The patient is nervous/anxious.    All other systems reviewed and are negative.        Physical Exam   BP: (!) 131/90  Pulse: 72  Temp: 98.1  F (36.7  C)  Resp: 20  SpO2: 98 %  Physical Exam  Vitals signs and nursing note reviewed.   HENT:      Head: Normocephalic.   Eyes:      Pupils: Pupils are equal, round, and reactive to light.   Neck:      Musculoskeletal: Normal range of motion.   Pulmonary:      Effort: Pulmonary effort is normal.   Musculoskeletal: Normal range of motion.   Neurological:      General: No focal deficit present.       Mental Status: She is alert.   Psychiatric:         Attention and Perception: Attention and perception normal. She does not perceive auditory or visual hallucinations.         Mood and Affect: Mood and affect normal.         Speech: Speech normal.         Behavior: Behavior normal. Behavior is not agitated, aggressive, hyperactive or combative. Behavior is cooperative.         Thought Content: Thought content normal. Thought content is not paranoid or delusional. Thought content does not include homicidal or suicidal ideation.         Cognition and Memory: Cognition and memory normal.         Judgment: Judgment normal.         ED Course      Procedures               No results found for any visits on 05/23/21.  Medications   OLANZapine zydis (zyPREXA) ODT tab 5 mg (has no administration in time range)        Assessments & Plan (with Medical Decision Making)   Patient with history of bipolar illness, borderline personality disorder who has poor coping skills and low frustration tolerance. She has been acting out and threatening self-harm by biting on herself when upset. She was discharged from here early this morning. Patient has returned to baseline, likely due to being away from the conflictual environment of the group home. She feels safe and ready to return to her group home. She is given a dose of Zyprexa 5 mg to ease her back home. She will be discharged via cab.    I have reviewed the nursing notes. I have reviewed the findings, diagnosis, plan and need for follow up with the patient.    New Prescriptions    No medications on file       Final diagnoses:   Borderline personality disorder (H)   Anxiety       --    [facility] EMERGENCY DEPARTMENT  5/23/2021     Magno Sena MD  05/23/21 5940

## 2021-05-23 NOTE — ED NOTES
[facility] called and updated on patient discharge status. Patient agreed to try to communicate about feelings with staff, trying to listen to music when feeling upset, and continuing medication regiment at .
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
## 2021-05-23 NOTE — TELEPHONE ENCOUNTER
"Patient saying she has a plan to kill herself by \"biting\" herself, but she said they do not want her to call 911 again, and she \"doesn't want to go to snf.\"  She has been in the ER multiple times in the last week, and lives in a \"[facility]\" group home.    Staff at group home not able to come to phone, refusing to come to the phone, but patient said we are to call patient's nurse at [phone], but just received a VM.    RN called 911 with patient on the line and connected dispatch to patient. RN stayed on phone with patient until staff member came with patient to wait until paramedics got there.    Areli Franklin RN  [facility]        Reason for Disposition    Patient is threatening suicide now    Additional Information    Negative: Patient attempted suicide    Protocols used: SUICIDE [id number]      "
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
## 2021-05-23 NOTE — ED NOTES
Bed: HW02  Expected date: 5/22/21  Expected time: 8:48 PM  Means of arrival: Ambulance  Comments:  [id number]  23F  SI

## 2021-05-23 NOTE — ED TRIAGE NOTES
"Pt lives in group home, room mate made pt upset \"When that happens, I want to kill everyone and myself.\"    "
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
## 2021-05-25 ENCOUNTER — HOSPITAL ENCOUNTER (EMERGENCY)
Facility: CLINIC | Age: 21
Discharge: NURSING, REHAB OR LONG-TERM CARE | End: 2021-05-25
Attending: FAMILY MEDICINE | Admitting: FAMILY MEDICINE
Payer: MEDICARE

## 2021-05-25 ENCOUNTER — MEDICAL CORRESPONDENCE (OUTPATIENT)
Dept: HEALTH INFORMATION MANAGEMENT | Facility: CLINIC | Age: 21
End: 2021-05-25

## 2021-05-25 VITALS
TEMPERATURE: 98.4 F | HEART RATE: 79 BPM | SYSTOLIC BLOOD PRESSURE: 128 MMHG | OXYGEN SATURATION: 99 % | DIASTOLIC BLOOD PRESSURE: 86 MMHG | RESPIRATION RATE: 18 BRPM

## 2021-05-25 DIAGNOSIS — F31.9 BIPOLAR AFFECTIVE DISORDER, REMISSION STATUS UNSPECIFIED (H): ICD-10-CM

## 2021-05-25 DIAGNOSIS — F60.3 BORDERLINE PERSONALITY DISORDER (H): ICD-10-CM

## 2021-05-25 DIAGNOSIS — Z63.8 FAMILY CONFLICT: ICD-10-CM

## 2021-05-25 PROCEDURE — 99285 EMERGENCY DEPT VISIT HI MDM: CPT | Mod: 25 | Performed by: FAMILY MEDICINE

## 2021-05-25 PROCEDURE — 99283 EMERGENCY DEPT VISIT LOW MDM: CPT | Performed by: FAMILY MEDICINE

## 2021-05-25 PROCEDURE — 90791 PSYCH DIAGNOSTIC EVALUATION: CPT

## 2021-05-25 SDOH — SOCIAL STABILITY - SOCIAL INSECURITY: OTHER SPECIFIED PROBLEMS RELATED TO PRIMARY SUPPORT GROUP: Z63.8

## 2021-05-25 NOTE — ED NOTES
Bed: HW04  Expected date: 5/25/21  Expected time: 4:40 PM  Means of arrival:   Comments:  Isai 635  21yo F SI with plan to cut herself. Has not been cutting
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
## 2021-05-25 NOTE — ED NOTES
Patient screened for one-to-one clearance.  She states that she is unable to contract for safety.  She states that she is concerned that she is going to bite herself.  We will maintain one-to-one.      Ambrocio Ferro MD  05/25/21 [id number]

## 2021-05-26 ENCOUNTER — HOSPITAL ENCOUNTER (EMERGENCY)
Facility: CLINIC | Age: 21
Discharge: HOME | End: 2021-05-26
Attending: PSYCHIATRY & NEUROLOGY | Admitting: PSYCHIATRY & NEUROLOGY
Payer: MEDICARE

## 2021-05-26 ENCOUNTER — NURSE TRIAGE (OUTPATIENT)
Dept: NURSING | Facility: CLINIC | Age: 21
End: 2021-05-26

## 2021-05-26 VITALS — SYSTOLIC BLOOD PRESSURE: 128 MMHG | HEART RATE: 83 BPM | OXYGEN SATURATION: 98 % | DIASTOLIC BLOOD PRESSURE: 65 MMHG

## 2021-05-26 DIAGNOSIS — F60.3 BORDERLINE PERSONALITY DISORDER (H): ICD-10-CM

## 2021-05-26 PROCEDURE — 99284 EMERGENCY DEPT VISIT MOD MDM: CPT | Performed by: PSYCHIATRY & NEUROLOGY

## 2021-05-26 PROCEDURE — 99285 EMERGENCY DEPT VISIT HI MDM: CPT | Mod: 25 | Performed by: PSYCHIATRY & NEUROLOGY

## 2021-05-26 PROCEDURE — 90791 PSYCH DIAGNOSTIC EVALUATION: CPT

## 2021-05-26 ASSESSMENT — ENCOUNTER SYMPTOMS
NERVOUS/ANXIOUS: 1
RESPIRATORY NEGATIVE: 1
NEUROLOGICAL NEGATIVE: 1
MUSCULOSKELETAL NEGATIVE: 1
CARDIOVASCULAR NEGATIVE: 1
GASTROINTESTINAL NEGATIVE: 1
EYES NEGATIVE: 1
HALLUCINATIONS: 0
CONSTITUTIONAL NEGATIVE: 1
DECREASED CONCENTRATION: 1
HYPERACTIVE: 0

## 2021-05-26 NOTE — DISCHARGE INSTRUCTIONS
Please see your therapist to work on resilience and support  Follow-up established care and services

## 2021-05-26 NOTE — ED PROVIDER NOTES
ED Provider Note  [facility]      History     Chief Complaint   Patient presents with     Suicidal     HPI  Sadaf Ross is a 20 year old female who is here via EMS from her group home. This is patient's 8th ED visit past 10 days. She reports calling 911 earlier today as she was upset upon ruminating about her conflict with mother and sister this weekend. She was able to calm down when they arrived but then got upset again when they left. She was biting on her hand and when staff neglected to notice her behavior, she then called [facility], wanting them to come out but instead, got sent here. She does not feel she needs to be here as she is not feeling acutely suicidal. She reports having seen her therapist this week. She feels she can request more session to deal with her family conflict dynamics. She feels safe and ready to return to her group home. There is no acute psychosis.    Please see DEC Crisis Assessment on 5/26/21 in Epic for further details.    PERSONAL MEDICAL HISTORY  Past Medical History:   Diagnosis Date     ADHD (attention deficit hyperactivity disorder)      Bipolar 1 disorder (H)      Depressive disorder      PAST SURGICAL HISTORY  Past Surgical History:   Procedure Laterality Date     APPENDECTOMY       FAMILY HISTORY  No family history on file.  SOCIAL HISTORY  Social History     Tobacco Use     Smoking status: Current Some Day Smoker     Years: 5.00     Types: Cigarettes     Smokeless tobacco: Never Used   Substance Use Topics     Alcohol use: No     MEDICATIONS  No current facility-administered medications for this encounter.      Current Outpatient Medications   Medication     acetaminophen (TYLENOL) 650 MG CR tablet     alum & mag hydroxide-simethicone (MAALOX) 200-200-20 MG/5ML SUSP suspension     ARIPiprazole ER (ABILIFY MAINTENA) 400 MG extended release inj syringe     calcium carbonate (TUMS) 500 MG chewable tablet     chlorhexidine (CHLORHEXIDINE) 0.12 %  solution     docusate sodium (COLACE) 100 MG tablet     hydrOXYzine (ATARAX) 25 MG tablet     ibuprofen (ADVIL/MOTRIN) 400 MG tablet     lithium (ESKALITH) 150 MG capsule     naltrexone (DEPADE/REVIA) 50 MG tablet     norgestimate-ethinyl estradiol (SPRINTEC 28) 0.25-35 MG-MCG tablet     omeprazole (PRILOSEC) 40 MG DR capsule     polyethylene glycol (MIRALAX) 17 g packet     traZODone (DESYREL) 50 MG tablet     venlafaxine (EFFEXOR-XR) 150 MG 24 hr capsule     Vitamin D, Cholecalciferol, 25 MCG (1000 UT) TABS     ALLERGIES  Allergies   Allergen Reactions     Penicillins Rash and Unknown          Review of Systems   Constitutional: Negative.    HENT: Negative.    Eyes: Negative.    Respiratory: Negative.    Cardiovascular: Negative.    Gastrointestinal: Negative.    Genitourinary: Negative.    Musculoskeletal: Negative.    Skin: Negative.    Neurological: Negative.    Psychiatric/Behavioral: Positive for decreased concentration and suicidal ideas. Negative for hallucinations. The patient is nervous/anxious. The patient is not hyperactive.    All other systems reviewed and are negative.        Physical Exam      Physical Exam  Vitals signs and nursing note reviewed.   HENT:      Head: Normocephalic.   Eyes:      Pupils: Pupils are equal, round, and reactive to light.   Neck:      Musculoskeletal: Normal range of motion.   Pulmonary:      Effort: Pulmonary effort is normal.   Musculoskeletal: Normal range of motion.   Neurological:      General: No focal deficit present.      Mental Status: She is alert.   Psychiatric:         Attention and Perception: Attention and perception normal. She does not perceive auditory or visual hallucinations.         Mood and Affect: Mood and affect normal.         Speech: Speech normal.         Behavior: Behavior normal. Behavior is not agitated, aggressive, hyperactive or combative. Behavior is cooperative.         Thought Content: Thought content normal. Thought content is not  paranoid or delusional. Thought content does not include homicidal or suicidal ideation.         Cognition and Memory: Cognition and memory normal.         Judgment: Judgment normal.         ED Course      Procedures             No results found for any visits on 05/26/21.  Medications - No data to display     Assessments & Plan (with Medical Decision Making)   Patient with borderline personality disorder and I suspect borderline intellectual functioning who is using the ED excessively to get her needs met. She did not feel she needed to be seen here but got sent here anyway when she called [facility]. She feels she is at baseline. She feels safe being in her group home. Staff are comfortable having her return. She can be cabbed back. Patient is recommended to increase therapy services to get her needs met.    I have reviewed the nursing notes. I have reviewed the findings, diagnosis, plan and need for follow up with the patient.    New Prescriptions    No medications on file       Final diagnoses:   Borderline personality disorder (H)       --  Magno Sena MD  [facility] EMERGENCY DEPARTMENT  5/26/2021     Magno Sena MD  05/26/21 4289

## 2021-05-26 NOTE — DISCHARGE INSTRUCTIONS
Discharge back to [facility] home will follow up with current outpatient providers.

## 2021-05-26 NOTE — ED TRIAGE NOTES
Patient called SIDDHARTH stating that she was suicidal. SIDDHARTH advised her to call 911. Patient is her own guardian. Patient annoyed that she is here. She just wants [facility] to come out and talk to her.

## 2021-05-27 NOTE — TELEPHONE ENCOUNTER
"Patient states she was just in the ED for a suicidal attempt. Patient has been in the ED multiple times over the past 2 weeks. She came home and got into a fight with her sister over the phone, her sister told her to \"go to hell\" Patient states she wants to \"go to hell\" now. Patient lives in a group home. She says she has suicidal thoughts. She wants to bite her hand, hit hand on the wall and cutting. Advised to call 911. Patient agrees to call 911 and plans to do so.     RN did call patient back after a few minutes to check on her. Patient states the staff member is calling the boss right now. Patient is outside having a cigarette. RN asked to speak with staff member. Staff member refused to come to the phone. RN did call 911 for the patient.     RN did call patient back to notify her that paramedics are on the way to check on her. Patient verbalized understanding and had no further questions.    Zoë Trimble RN/[facility]              COVID 19 Nurse Triage Plan/Patient Instructions    Please be aware that novel coronavirus (COVID-19) may be circulating in the community. If you develop symptoms such as fever, cough, or SOB or if you have concerns about the presence of another infection including coronavirus (COVID-19), please contact your health care provider or visit [email or web address].     Disposition/Instructions    Call to EMS/911 recommended. Follow protocol based instructions.     Bring Your Own Device:  Please also bring your smart device(s) (smart phones, tablets, laptops) and their charging cables for your personal use and to communicate with your care team during your visit.    Thank you for taking steps to prevent the spread of this virus.  o Limit your contact with others.  o Wear a simple mask to cover your cough.  o Wash your hands well and often.    Resources    [facility]: About COVID-19: [email or web address]    CDC: What to Do If You're Sick: " www.cdc.gov/coronavirus/2019-ncov/about/steps-when-sick.html    CDC: Ending Home Isolation: www.cdc.gov/coronavirus/2019-ncov/hcp/disposition-in-home-patients.html     CDC: Caring for Someone: www.cdc.gov/coronavirus/2019-ncov/if-you-are-sick/care-for-someone.html     [facility]: Interim Guidance for Hospital Discharge to Home: [email or web address]/hcp/hospdischarge.pdf    [facility] clinical trials (COVID-19 research studies): [email or web address]-clinical-trials     Below are the COVID-19 hotlines at the [place] Department of [facility] ([facility]). Interpreters are available.   o For health questions: Call 817-120-3762 or 1-464.106.2501 (7 a.m. to 7 p.m.)  o For questions about schools and childcare: Call 779-206-8902 or 1-143.763.8583 (7 a.m. to 7 p.m.)                     Reason for Disposition    Patient is threatening suicide now    Additional Information    Negative: Patient attempted suicide    Protocols used: SUICIDE CEOLSCOS-H-WT

## 2021-05-27 NOTE — TELEPHONE ENCOUNTER
RN did receive a call a few minutes after speaking with 911. The police officers were calling to get more information regarding the 911 call. The police officers were asking for patient's cell phone number. The police officers states they will call and go see the patient now.     Zoë Trimble RN/[facility]

## 2021-06-17 NOTE — PROGRESS NOTES
Pt sent to ed by gonzalo.  B: pt worsening depression, SI several days, refusing meds past 3 days. Pt states Si with intent to cut wrists deeply and bleed out.   SIB scratching self, head banging. HI toward a [facility] peer but no plan.  Pt states seeing dad's spirit and states it is in her body, hearing voices. Worsening nightmares.  Pt dad  in February from cancer, BF break up 2 days ago.   A: dx MDD w/psychosis. Cooperative, vol.  R: 4500/sumi  Patient cleared and ready for behavioral bed placement: Yes  Covid negative

## 2021-07-09 ENCOUNTER — APPOINTMENT (OUTPATIENT)
Dept: ULTRASOUND IMAGING | Facility: CLINIC | Age: 21
End: 2021-07-09
Attending: EMERGENCY MEDICINE
Payer: MEDICARE

## 2021-07-09 ENCOUNTER — APPOINTMENT (OUTPATIENT)
Dept: CT IMAGING | Facility: CLINIC | Age: 21
End: 2021-07-09
Attending: EMERGENCY MEDICINE
Payer: MEDICARE

## 2021-07-09 ENCOUNTER — HOSPITAL ENCOUNTER (EMERGENCY)
Facility: CLINIC | Age: 21
Discharge: HOME | End: 2021-07-10
Attending: EMERGENCY MEDICINE | Admitting: EMERGENCY MEDICINE
Payer: MEDICARE

## 2021-07-09 ENCOUNTER — APPOINTMENT (OUTPATIENT)
Dept: GENERAL RADIOLOGY | Facility: CLINIC | Age: 21
End: 2021-07-09
Attending: EMERGENCY MEDICINE
Payer: MEDICARE

## 2021-07-09 DIAGNOSIS — R11.2 NON-INTRACTABLE VOMITING WITH NAUSEA, UNSPECIFIED VOMITING TYPE: ICD-10-CM

## 2021-07-09 DIAGNOSIS — R11.2 NAUSEA WITH VOMITING: ICD-10-CM

## 2021-07-09 DIAGNOSIS — R55 SYNCOPE AND COLLAPSE: ICD-10-CM

## 2021-07-09 DIAGNOSIS — F31.9 BIPOLAR DISORDER, UNSPECIFIED (H): ICD-10-CM

## 2021-07-09 DIAGNOSIS — R45.851 SUICIDAL IDEATION: ICD-10-CM

## 2021-07-09 DIAGNOSIS — N30.00 ACUTE CYSTITIS WITHOUT HEMATURIA: ICD-10-CM

## 2021-07-09 DIAGNOSIS — R10.84 ABDOMINAL PAIN, GENERALIZED: ICD-10-CM

## 2021-07-09 LAB
ALBUMIN SERPL-MCNC: 4.4 G/DL (ref 3.4–5)
ALBUMIN UR-MCNC: 10 MG/DL
ALP SERPL-CCNC: 68 U/L (ref 40–150)
ALT SERPL W P-5'-P-CCNC: 53 U/L (ref 0–50)
AMPHETAMINES UR QL SCN: NEGATIVE
ANION GAP SERPL CALCULATED.3IONS-SCNC: 5 MMOL/L (ref 3–14)
APPEARANCE UR: ABNORMAL
AST SERPL W P-5'-P-CCNC: 27 U/L (ref 0–45)
BACTERIA #/AREA URNS HPF: ABNORMAL /HPF
BARBITURATES UR QL: NEGATIVE
BASOPHILS # BLD AUTO: 0.1 10E9/L (ref 0–0.2)
BASOPHILS NFR BLD AUTO: 0.5 %
BENZODIAZ UR QL: NEGATIVE
BILIRUB SERPL-MCNC: 0.3 MG/DL (ref 0.2–1.3)
BILIRUB UR QL STRIP: NEGATIVE
BUN SERPL-MCNC: 9 MG/DL (ref 7–30)
CALCIUM SERPL-MCNC: 9.7 MG/DL (ref 8.5–10.1)
CANNABINOIDS UR QL SCN: NEGATIVE
CHLORIDE SERPL-SCNC: 107 MMOL/L (ref 94–109)
CO2 SERPL-SCNC: 26 MMOL/L (ref 20–32)
COCAINE UR QL: NEGATIVE
COLOR UR AUTO: ABNORMAL
CREAT SERPL-MCNC: 0.75 MG/DL (ref 0.52–1.04)
D DIMER PPP FEU-MCNC: 0.3 UG/ML FEU (ref 0–0.5)
DIFFERENTIAL METHOD BLD: ABNORMAL
EOSINOPHIL # BLD AUTO: 0.1 10E9/L (ref 0–0.7)
EOSINOPHIL NFR BLD AUTO: 1.1 %
ERYTHROCYTE [DISTWIDTH] IN BLOOD BY AUTOMATED COUNT: 12.8 % (ref 10–15)
ETHANOL UR QL SCN: NEGATIVE
GFR SERPL CREATININE-BSD FRML MDRD: >90 ML/MIN/{1.73_M2}
GLUCOSE SERPL-MCNC: 87 MG/DL (ref 70–99)
GLUCOSE UR STRIP-MCNC: NEGATIVE MG/DL
HCG UR QL: NEGATIVE
HCT VFR BLD AUTO: 41 % (ref 35–47)
HGB BLD-MCNC: 13.3 G/DL (ref 11.7–15.7)
HGB UR QL STRIP: ABNORMAL
IMM GRANULOCYTES # BLD: 0.1 10E9/L (ref 0–0.4)
IMM GRANULOCYTES NFR BLD: 0.5 %
KETONES UR STRIP-MCNC: NEGATIVE MG/DL
LACTATE BLD-SCNC: 0.7 MMOL/L (ref 0.7–2)
LEUKOCYTE ESTERASE UR QL STRIP: ABNORMAL
LIPASE SERPL-CCNC: 271 U/L (ref 73–393)
LYMPHOCYTES # BLD AUTO: 2.8 10E9/L (ref 0.8–5.3)
LYMPHOCYTES NFR BLD AUTO: 23.4 %
MCH RBC QN AUTO: 27.9 PG (ref 26.5–33)
MCHC RBC AUTO-ENTMCNC: 32.4 G/DL (ref 31.5–36.5)
MCV RBC AUTO: 86 FL (ref 78–100)
MONOCYTES # BLD AUTO: 0.5 10E9/L (ref 0–1.3)
MONOCYTES NFR BLD AUTO: 4.1 %
MUCOUS THREADS #/AREA URNS LPF: PRESENT /LPF
NEUTROPHILS # BLD AUTO: 8.5 10E9/L (ref 1.6–8.3)
NEUTROPHILS NFR BLD AUTO: 70.4 %
NITRATE UR QL: NEGATIVE
NRBC # BLD AUTO: 0 10*3/UL
NRBC BLD AUTO-RTO: 0 /100
OPIATES UR QL SCN: NEGATIVE
PH UR STRIP: 5.5 PH (ref 5–7)
PLATELET # BLD AUTO: 301 10E9/L (ref 150–450)
POTASSIUM SERPL-SCNC: 4 MMOL/L (ref 3.4–5.3)
PROT SERPL-MCNC: 8.6 G/DL (ref 6.8–8.8)
RBC # BLD AUTO: 4.76 10E12/L (ref 3.8–5.2)
RBC #/AREA URNS AUTO: 0 /HPF (ref 0–2)
SODIUM SERPL-SCNC: 138 MMOL/L (ref 133–144)
SOURCE: ABNORMAL
SP GR UR STRIP: 1.02 (ref 1–1.03)
SQUAMOUS #/AREA URNS AUTO: 0 /HPF (ref 0–1)
TROPONIN I SERPL-MCNC: <0.015 UG/L (ref 0–0.04)
UROBILINOGEN UR STRIP-MCNC: NORMAL MG/DL (ref 0–2)
WBC # BLD AUTO: 12.1 10E9/L (ref 4–11)
WBC #/AREA URNS AUTO: 6 /HPF (ref 0–5)

## 2021-07-09 PROCEDURE — 84484 ASSAY OF TROPONIN QUANT: CPT | Performed by: EMERGENCY MEDICINE

## 2021-07-09 PROCEDURE — 250N000011 HC RX IP 250 OP 636: Performed by: EMERGENCY MEDICINE

## 2021-07-09 PROCEDURE — 250N000009 HC RX 250: Performed by: EMERGENCY MEDICINE

## 2021-07-09 PROCEDURE — 85025 COMPLETE CBC W/AUTO DIFF WBC: CPT | Performed by: EMERGENCY MEDICINE

## 2021-07-09 PROCEDURE — 96374 THER/PROPH/DIAG INJ IV PUSH: CPT | Mod: 59

## 2021-07-09 PROCEDURE — 250N000013 HC RX MED GY IP 250 OP 250 PS 637: Performed by: EMERGENCY MEDICINE

## 2021-07-09 PROCEDURE — 87086 URINE CULTURE/COLONY COUNT: CPT | Performed by: EMERGENCY MEDICINE

## 2021-07-09 PROCEDURE — 96361 HYDRATE IV INFUSION ADD-ON: CPT

## 2021-07-09 PROCEDURE — 85379 FIBRIN DEGRADATION QUANT: CPT | Performed by: EMERGENCY MEDICINE

## 2021-07-09 PROCEDURE — 93005 ELECTROCARDIOGRAM TRACING: CPT

## 2021-07-09 PROCEDURE — 71046 X-RAY EXAM CHEST 2 VIEWS: CPT

## 2021-07-09 PROCEDURE — 80307 DRUG TEST PRSMV CHEM ANLYZR: CPT | Performed by: EMERGENCY MEDICINE

## 2021-07-09 PROCEDURE — 99285 EMERGENCY DEPT VISIT HI MDM: CPT | Mod: 25 | Performed by: EMERGENCY MEDICINE

## 2021-07-09 PROCEDURE — 81025 URINE PREGNANCY TEST: CPT | Performed by: EMERGENCY MEDICINE

## 2021-07-09 PROCEDURE — 99285 EMERGENCY DEPT VISIT HI MDM: CPT | Mod: 25

## 2021-07-09 PROCEDURE — 90791 PSYCH DIAGNOSTIC EVALUATION: CPT

## 2021-07-09 PROCEDURE — 80053 COMPREHEN METABOLIC PANEL: CPT | Performed by: EMERGENCY MEDICINE

## 2021-07-09 PROCEDURE — 258N000003 HC RX IP 258 OP 636: Performed by: EMERGENCY MEDICINE

## 2021-07-09 PROCEDURE — 80178 ASSAY OF LITHIUM: CPT | Performed by: EMERGENCY MEDICINE

## 2021-07-09 PROCEDURE — 96375 TX/PRO/DX INJ NEW DRUG ADDON: CPT

## 2021-07-09 PROCEDURE — 81001 URINALYSIS AUTO W/SCOPE: CPT | Performed by: EMERGENCY MEDICINE

## 2021-07-09 PROCEDURE — 80320 DRUG SCREEN QUANTALCOHOLS: CPT | Performed by: EMERGENCY MEDICINE

## 2021-07-09 PROCEDURE — 83605 ASSAY OF LACTIC ACID: CPT | Performed by: EMERGENCY MEDICINE

## 2021-07-09 PROCEDURE — 83690 ASSAY OF LIPASE: CPT | Performed by: EMERGENCY MEDICINE

## 2021-07-09 PROCEDURE — 74177 CT ABD & PELVIS W/CONTRAST: CPT

## 2021-07-09 PROCEDURE — 76830 TRANSVAGINAL US NON-OB: CPT

## 2021-07-09 PROCEDURE — 93010 ELECTROCARDIOGRAM REPORT: CPT | Performed by: EMERGENCY MEDICINE

## 2021-07-09 RX ORDER — KETOROLAC TROMETHAMINE 15 MG/ML
15 INJECTION, SOLUTION INTRAMUSCULAR; INTRAVENOUS ONCE
Status: COMPLETED | OUTPATIENT
Start: 2021-07-09 | End: 2021-07-09

## 2021-07-09 RX ORDER — ONDANSETRON 2 MG/ML
8 INJECTION INTRAMUSCULAR; INTRAVENOUS ONCE
Status: COMPLETED | OUTPATIENT
Start: 2021-07-09 | End: 2021-07-09

## 2021-07-09 RX ORDER — CIPROFLOXACIN 500 MG/1
500 TABLET, FILM COATED ORAL ONCE
Status: COMPLETED | OUTPATIENT
Start: 2021-07-09 | End: 2021-07-10

## 2021-07-09 RX ORDER — IOPAMIDOL 755 MG/ML
100 INJECTION, SOLUTION INTRAVASCULAR ONCE
Status: COMPLETED | OUTPATIENT
Start: 2021-07-09 | End: 2021-07-09

## 2021-07-09 RX ORDER — OLANZAPINE 5 MG/1
5 TABLET, ORALLY DISINTEGRATING ORAL ONCE
Status: COMPLETED | OUTPATIENT
Start: 2021-07-09 | End: 2021-07-09

## 2021-07-09 RX ADMIN — SODIUM CHLORIDE 68 ML: 9 INJECTION, SOLUTION INTRAVENOUS at 19:55

## 2021-07-09 RX ADMIN — OLANZAPINE 5 MG: 5 TABLET, ORALLY DISINTEGRATING ORAL at 21:52

## 2021-07-09 RX ADMIN — ONDANSETRON 8 MG: 2 INJECTION INTRAMUSCULAR; INTRAVENOUS at 17:51

## 2021-07-09 RX ADMIN — IOPAMIDOL 100 ML: 755 INJECTION, SOLUTION INTRAVENOUS at 19:56

## 2021-07-09 RX ADMIN — LIDOCAINE HYDROCHLORIDE 30 ML: 20 SOLUTION ORAL; TOPICAL at 17:56

## 2021-07-09 RX ADMIN — SODIUM CHLORIDE 1000 ML: 9 INJECTION, SOLUTION INTRAVENOUS at 17:54

## 2021-07-09 RX ADMIN — SODIUM CHLORIDE 1000 ML: 9 INJECTION, SOLUTION INTRAVENOUS at 21:56

## 2021-07-09 RX ADMIN — KETOROLAC TROMETHAMINE 15 MG: 15 INJECTION, SOLUTION INTRAMUSCULAR; INTRAVENOUS at 21:53

## 2021-07-09 NOTE — ED PROVIDER NOTES
[facility] EMERGENCY DEPARTMENT ([facility])   July 9, 2021  History     Chief Complaint   Patient presents with      Outpatient     Pt came from [facility] ckinic after she passed out.  Wkoked up for them.  Walked to [facility] paramedic and able to sign tablet for paramedic.  Pt quiet and not talking much.  Pt has a lot of stressors in the Group Home she is living @.  States there is another person @  that she is having a problem with.   VSS.     Abdominal Pain     States shse has generalized abd pain and vomited 7 times.     Suicidal     States she feels Si today without plan or intent but stopped herself from slitting her throat 2 days ago.     HPI  Sadaf Ross is a 20 year old female PMH significant for bipolar 1 disorder, borderline personality disorder, depression, intellectual disability, and suicidal ideations who was brought to the emergency department after she passed out in clinic earlier today.  Patient tells me that she vomited 7 times at home earlier today and then does not remember what happened in the clinic.  She states she does not remember if she was lightheaded or not, but states that she had both chest pain and shortness of breath starting earlier today and it has continued.  She has had constant chest pain and shortness of breath since this morning.  She denies injury from passing out.  Patient otherwise denies fever, cough.  She does report diffuse abdominal pain, also since this morning.  She denies dysuria, hematuria.  She states her last menstrual period was last month.  Patient also reports that she has not been taking her medications for the last 2 weeks because she does not think that they work.  She is living in a group home at this time.  Patient reports that she is having suicidal thoughts, but denies homicidal thoughts.  She denies having a plan.  She also states that she is having visual hallucinations and seeing her dead father.    Per review of the  patient's chart, she has been seen in the Emergency Department 3 times in the last week all at [facility] ED (7/7/2021, 7/6/2021, 7/2/2021).  First visit was for visual hallucinations of her dead father and sister.  The second visit was for behavioral issues where she got into an altercation with group home staff, but denied any SI or HI.  The third visit (yesterday) she presented to the ED for suicidal ideations, but was discharged to her group home when she no longer had any SI and was encouraged to follow-up with her outpatient mental health providers or return to the ED as needed.    PAST MEDICAL HISTORY:   Past Medical History:   Diagnosis Date     ADHD (attention deficit hyperactivity disorder)      Bipolar 1 disorder (H)      Borderline personality disorder (H)      Depression      Depressive disorder      Intellectual disability      Obesity      Syncope        PAST SURGICAL HISTORY:   Past Surgical History:   Procedure Laterality Date     APPENDECTOMY       APPENDECTOMY         Past medical history, past surgical history, medications, and allergies were reviewed with the patient. Additional pertinent items: None    FAMILY HISTORY: No family history on file.    SOCIAL HISTORY:   Social History     Tobacco Use     Smoking status: Current Some Day Smoker     Years: 5.00     Types: Cigarettes     Smokeless tobacco: Never Used   Substance Use Topics     Alcohol use: No     Social history was reviewed with the patient. Additional pertinent items: None      Patient's Medications   New Prescriptions    No medications on file   Previous Medications    ACETAMINOPHEN (TYLENOL) 650 MG CR TABLET    Take 650 mg by mouth every 4 hours as needed for mild pain or fever    ALUM & MAG HYDROXIDE-SIMETHICONE (MAALOX) 200-200-20 MG/5ML SUSP SUSPENSION    Take 20 mLs by mouth once as needed for indigestion    ARIPIPRAZOLE ER (ABILIFY MAINTENA) 400 MG EXTENDED RELEASE INJ SYRINGE    Inject 400 mg into the  muscle every 28 days On the 4th of each month    CALCIUM CARBONATE (TUMS) 500 MG CHEWABLE TABLET    Take 2 chew tab by mouth 4 times daily as needed for heartburn    CHLORHEXIDINE (CHLORHEXIDINE) 0.12 % SOLUTION    Swish and spit 10 mLs in mouth 2 times daily    DOCUSATE SODIUM (COLACE) 100 MG TABLET    Take 100 mg by mouth 2 times daily    HYDROXYZINE (ATARAX) 25 MG TABLET    Take 25-50 mg by mouth every 4 hours as needed for itching    IBUPROFEN (ADVIL/MOTRIN) 400 MG TABLET    Take 400 mg by mouth 3 times daily as needed for moderate pain    LITHIUM (ESKALITH) 150 MG CAPSULE    Take 150 mg by mouth At Bedtime    NALTREXONE (DEPADE/REVIA) 50 MG TABLET    Take 50 mg by mouth At Bedtime    NORGESTIMATE-ETHINYL ESTRADIOL (SPRINTEC 28) 0.25-35 MG-MCG TABLET    Take 1 tablet by mouth daily    OMEPRAZOLE (PRILOSEC) 40 MG DR CAPSULE    Take 40 mg by mouth daily before breakfast    POLYETHYLENE GLYCOL (MIRALAX) 17 G PACKET    Take 1 packet by mouth daily    TRAZODONE (DESYREL) 50 MG TABLET    Take 50 mg by mouth At Bedtime    VENLAFAXINE (EFFEXOR-XR) 150 MG 24 HR CAPSULE    Take 2 capsules (300 mg) by mouth daily    VITAMIN D, CHOLECALCIFEROL, 25 MCG (1000 UT) TABS    Take 1,000 Units by mouth daily    Modified Medications    No medications on file   Discontinued Medications    No medications on file          Allergies   Allergen Reactions     Penicillins Rash and Unknown        Review of Systems  A complete review of systems was performed with pertinent positives and negatives noted in the HPI, and all other systems negative.    Physical Exam   BP: 107/51  Pulse: 72  Temp: 97.2  F (36.2  C)  Resp: 18  Weight: 101.8 kg (224 lb 6.4 oz)  SpO2: 99 %      Physical Exam    GEN:  Alert, well developed, no acute distress  HEENT:  PERRL, EOMI, Mucous membranes are moist.   Cardio:  RRR, no murmur, radial pulses equal bilaterally  PULM:  Lungs clear, good air movement, no wheezes, rales   Abd:  Soft, normal bowel sounds, no focal  tenderness  Back exam:  No CVA tenderness  Musculoskeletal:  normal range of motion, no lower extremity swelling or calf tenderness  Neuro:  Alert and oriented X3, Follows commands, moving all extremities spontaneously   Skin:  Warm, dry   Psychiatric: Has suicidal ideation without a plan, no homicidal ideation, no auditory hallucinations, reports visual hallucinations where she is seeing her dead father.  ED Course        Procedures             EKG Interpretation:      Interpreted by Emma Negron MD  Time reviewed: 15:55  Symptoms at time of EKG: chest pain   Rhythm: sinus bradycardia  Rate: 53  Axis: normal  Ectopy: none  Conduction: normal  ST Segments/ T Waves: No ST-T wave changes prior to previous EKG  Q Waves: none  Comparison to prior: Unchanged from December 19, 2019 except that the heart rate has decreased today.    Clinical Impression: Sinus bradycardia, otherwise unremarkable EKG      Labs have been drawn and are pending at the time of shift change.  I did review the results of the urine testing and those results are shown below.  Patient was given Zofran for nausea and IV fluids for suspected dehydration.       Results for orders placed or performed during the hospital encounter of 07/09/21 (from the past 24 hour(s))   Drug abuse screen 6 urine (tox)   Result Value Ref Range    Amphetamine Qual Urine Negative NEG^Negative    Barbiturates Qual Urine Negative NEG^Negative    Benzodiazepine Qual Urine Negative NEG^Negative    Cannabinoids Qual Urine Negative NEG^Negative    Cocaine Qual Urine Negative NEG^Negative    Ethanol Qual Urine Negative NEG^Negative    Opiates Qualitative Urine Negative NEG^Negative   HCG qualitative urine   Result Value Ref Range    HCG Qual Urine Negative NEG^Negative   EKG 12 lead   Result Value Ref Range    Interpretation ECG Click View Image link to view waveform and result      Medications   0.9% sodium chloride BOLUS (1,000 mLs Intravenous New Bag 7/9/21 5937)    ondansetron (ZOFRAN) injection 8 mg (8 mg Intravenous Given 7/9/21 1751)   lidocaine (XYLOCAINE) 2 % 15 mL, alum & mag hydroxide-simethicone (MAALOX) 15 mL GI Cocktail (30 mLs Oral Given 7/9/21 1756)             Assessments & Plan (with Medical Decision Making)   Patient presents from clinic after possible syncope.  She reported vomiting several times earlier this morning, so dehydration is possible.  At the time of shift change, labs are still pending.  Patient will be signed out to the evening shift with the labs pending.  These are all normal, then patient will need to mental health assessment because of her suicidal ideation.    I have reviewed the nursing notes.    I have reviewed the findings, diagnosis, plan and need for follow up with the patient.    New Prescriptions    No medications on file       Final diagnoses:   Non-intractable vomiting with nausea, unspecified vomiting type   Abdominal pain, generalized       7/9/2021   [facility] EMERGENCY DEPARTMENT     Emma Negron MD  07/09/21 8440

## 2021-07-09 NOTE — ED NOTES
Bed: HW01  Expected date: 7/9/21  Expected time: 1:57 PM  Means of arrival:   Comments:  Edwige 435: 21 y/o, F, Mental Health Crisis

## 2021-07-10 ENCOUNTER — HOSPITAL ENCOUNTER (EMERGENCY)
Facility: CLINIC | Age: 21
Discharge: OTHER INSTITUTION | End: 2021-07-10
Attending: EMERGENCY MEDICINE | Admitting: EMERGENCY MEDICINE
Payer: MEDICARE

## 2021-07-10 ENCOUNTER — COMMUNICATION - HEALTHEAST (OUTPATIENT)
Dept: CARE COORDINATION | Facility: HOSPITAL | Age: 21
End: 2021-07-10

## 2021-07-10 ENCOUNTER — HOSPITAL ENCOUNTER (INPATIENT)
Dept: BEHAVIORAL HEALTH | Facility: CLINIC | Age: 21
LOS: 9 days | Discharge: ANOTHER HOSPITAL | DRG: 885 | End: 2021-07-19
Attending: PSYCHIATRY & NEUROLOGY | Admitting: PSYCHIATRY & NEUROLOGY
Payer: MEDICARE

## 2021-07-10 ENCOUNTER — TELEPHONE (OUTPATIENT)
Dept: BEHAVIORAL HEALTH | Facility: CLINIC | Age: 21
End: 2021-07-10

## 2021-07-10 VITALS
TEMPERATURE: 96.2 F | RESPIRATION RATE: 18 BRPM | BODY MASS INDEX: 39.75 KG/M2 | WEIGHT: 224.4 LBS | DIASTOLIC BLOOD PRESSURE: 48 MMHG | HEART RATE: 56 BPM | SYSTOLIC BLOOD PRESSURE: 104 MMHG | OXYGEN SATURATION: 97 %

## 2021-07-10 VITALS
RESPIRATION RATE: 20 BRPM | HEART RATE: 69 BPM | DIASTOLIC BLOOD PRESSURE: 57 MMHG | TEMPERATURE: 98.2 F | OXYGEN SATURATION: 100 % | SYSTOLIC BLOOD PRESSURE: 104 MMHG

## 2021-07-10 DIAGNOSIS — N39.0 URINARY TRACT INFECTION WITHOUT HEMATURIA, SITE UNSPECIFIED: ICD-10-CM

## 2021-07-10 DIAGNOSIS — R45.851 SUICIDAL IDEATION: ICD-10-CM

## 2021-07-10 DIAGNOSIS — Z91.89 AT RISK FOR SELF-INFLICTED INJURY: ICD-10-CM

## 2021-07-10 DIAGNOSIS — R11.2 NON-INTRACTABLE VOMITING WITH NAUSEA, UNSPECIFIED VOMITING TYPE: ICD-10-CM

## 2021-07-10 LAB
ANION GAP SERPL CALCULATED.3IONS-SCNC: 3 MMOL/L (ref 3–14)
ATRIAL RATE - MUSE: 53 BPM
BUN SERPL-MCNC: 8 MG/DL (ref 7–30)
CALCIUM SERPL-MCNC: 8.9 MG/DL (ref 8.5–10.1)
CHLORIDE SERPL-SCNC: 110 MMOL/L (ref 94–109)
CO2 SERPL-SCNC: 25 MMOL/L (ref 20–32)
CREAT SERPL-MCNC: 0.77 MG/DL (ref 0.52–1.04)
DIASTOLIC BLOOD PRESSURE - MUSE: NORMAL MMHG
GFR SERPL CREATININE-BSD FRML MDRD: >90 ML/MIN/{1.73_M2}
GLUCOSE BLDC GLUCOMTR-MCNC: 93 MG/DL (ref 70–99)
GLUCOSE SERPL-MCNC: 82 MG/DL (ref 70–99)
HGB BLD-MCNC: 12.6 G/DL (ref 11.7–15.7)
INTERPRETATION ECG - MUSE: NORMAL
LABORATORY COMMENT REPORT: NORMAL
LITHIUM SERPL-SCNC: <0.2 MMOL/L (ref 0.6–1.2)
P AXIS - MUSE: 20 DEGREES
POTASSIUM SERPL-SCNC: 3.9 MMOL/L (ref 3.4–5.3)
PR INTERVAL - MUSE: 168 MS
QRS DURATION - MUSE: 88 MS
QT - MUSE: 416 MS
QTC - MUSE: 390 MS
R AXIS - MUSE: 43 DEGREES
SARS-COV-2 RNA RESP QL NAA+PROBE: NEGATIVE
SODIUM SERPL-SCNC: 138 MMOL/L (ref 133–144)
SPECIMEN SOURCE: NORMAL
SYSTOLIC BLOOD PRESSURE - MUSE: NORMAL MMHG
T AXIS - MUSE: 15 DEGREES
TROPONIN I BLD-MCNC: 0 UG/L (ref 0–0.08)
VENTRICULAR RATE- MUSE: 53 BPM

## 2021-07-10 PROCEDURE — 250N000013 HC RX MED GY IP 250 OP 250 PS 637: Performed by: EMERGENCY MEDICINE

## 2021-07-10 PROCEDURE — 87635 SARS-COV-2 COVID-19 AMP PRB: CPT | Performed by: EMERGENCY MEDICINE

## 2021-07-10 PROCEDURE — 85018 HEMOGLOBIN: CPT | Performed by: EMERGENCY MEDICINE

## 2021-07-10 PROCEDURE — 96361 HYDRATE IV INFUSION ADD-ON: CPT | Performed by: EMERGENCY MEDICINE

## 2021-07-10 PROCEDURE — 999N001017 HC STATISTIC GLUCOSE BY METER IP

## 2021-07-10 PROCEDURE — 999N001212 HC REV CODE 9999 CHARGE CONVERSION OPNP

## 2021-07-10 PROCEDURE — 80048 BASIC METABOLIC PNL TOTAL CA: CPT | Performed by: EMERGENCY MEDICINE

## 2021-07-10 PROCEDURE — 250N000011 HC RX IP 250 OP 636: Performed by: EMERGENCY MEDICINE

## 2021-07-10 PROCEDURE — 124N000001 HC R&B MH

## 2021-07-10 PROCEDURE — C9803 HOPD COVID-19 SPEC COLLECT: HCPCS | Performed by: EMERGENCY MEDICINE

## 2021-07-10 PROCEDURE — 96360 HYDRATION IV INFUSION INIT: CPT | Mod: 59 | Performed by: EMERGENCY MEDICINE

## 2021-07-10 PROCEDURE — 99285 EMERGENCY DEPT VISIT HI MDM: CPT | Mod: 25 | Performed by: EMERGENCY MEDICINE

## 2021-07-10 PROCEDURE — 90791 PSYCH DIAGNOSTIC EVALUATION: CPT

## 2021-07-10 PROCEDURE — 258N000003 HC RX IP 258 OP 636: Performed by: EMERGENCY MEDICINE

## 2021-07-10 PROCEDURE — 84484 ASSAY OF TROPONIN QUANT: CPT

## 2021-07-10 PROCEDURE — 99285 EMERGENCY DEPT VISIT HI MDM: CPT | Performed by: EMERGENCY MEDICINE

## 2021-07-10 RX ORDER — ONDANSETRON 4 MG/1
4 TABLET, ORALLY DISINTEGRATING ORAL 2 TIMES DAILY PRN
Status: ON HOLD | COMMUNITY
End: 2021-07-10

## 2021-07-10 RX ORDER — CIPROFLOXACIN 500 MG/1
500 TABLET, FILM COATED ORAL EVERY 12 HOURS SCHEDULED
Status: DISCONTINUED | OUTPATIENT
Start: 2021-07-10 | End: 2021-07-10 | Stop reason: HOSPADM

## 2021-07-10 RX ORDER — CIPROFLOXACIN 500 MG/1
500 TABLET, FILM COATED ORAL 2 TIMES DAILY
Qty: 6 TABLET | Refills: 0 | Status: SHIPPED | OUTPATIENT
Start: 2021-07-10 | End: 2021-07-13

## 2021-07-10 RX ORDER — HYDROXYZINE HYDROCHLORIDE 50 MG/1
50 TABLET, FILM COATED ORAL ONCE
Status: COMPLETED | OUTPATIENT
Start: 2021-07-10 | End: 2021-07-10

## 2021-07-10 RX ORDER — MAGNESIUM HYDROXIDE/ALUMINUM HYDROXICE/SIMETHICONE 120; 1200; 1200 MG/30ML; MG/30ML; MG/30ML
30 SUSPENSION ORAL EVERY 4 HOURS PRN
Status: DISCONTINUED | OUTPATIENT
Start: 2021-07-11 | End: 2021-07-15

## 2021-07-10 RX ORDER — ONDANSETRON 8 MG/1
8 TABLET, ORALLY DISINTEGRATING ORAL ONCE
Status: COMPLETED | OUTPATIENT
Start: 2021-07-10 | End: 2021-07-10

## 2021-07-10 RX ORDER — ACETAMINOPHEN 325 MG/1
975 TABLET ORAL ONCE
Status: COMPLETED | OUTPATIENT
Start: 2021-07-10 | End: 2021-07-10

## 2021-07-10 RX ORDER — VENLAFAXINE HYDROCHLORIDE 150 MG/1
300 CAPSULE, EXTENDED RELEASE ORAL
Status: DISCONTINUED | OUTPATIENT
Start: 2021-07-11 | End: 2021-07-10 | Stop reason: HOSPADM

## 2021-07-10 RX ORDER — TRAZODONE HYDROCHLORIDE 50 MG/1
50 TABLET, FILM COATED ORAL AT BEDTIME
Status: DISCONTINUED | OUTPATIENT
Start: 2021-07-10 | End: 2021-07-10 | Stop reason: HOSPADM

## 2021-07-10 RX ORDER — LITHIUM CARBONATE 150 MG/1
150 CAPSULE ORAL AT BEDTIME
Status: DISCONTINUED | OUTPATIENT
Start: 2021-07-10 | End: 2021-07-10 | Stop reason: HOSPADM

## 2021-07-10 RX ORDER — CIPROFLOXACIN 500 MG/1
500 TABLET, FILM COATED ORAL ONCE
Status: COMPLETED | OUTPATIENT
Start: 2021-07-10 | End: 2021-07-10

## 2021-07-10 RX ADMIN — CIPROFLOXACIN HYDROCHLORIDE 500 MG: 500 TABLET, FILM COATED ORAL at 00:09

## 2021-07-10 RX ADMIN — ACETAMINOPHEN 975 MG: 325 TABLET, FILM COATED ORAL at 18:29

## 2021-07-10 RX ADMIN — ONDANSETRON 8 MG: 8 TABLET, ORALLY DISINTEGRATING ORAL at 13:09

## 2021-07-10 RX ADMIN — SODIUM CHLORIDE 1000 ML: 9 INJECTION, SOLUTION INTRAVENOUS at 14:31

## 2021-07-10 RX ADMIN — HYDROXYZINE HYDROCHLORIDE 50 MG: 50 TABLET, FILM COATED ORAL at 15:28

## 2021-07-10 RX ADMIN — HYDROXYZINE HYDROCHLORIDE 50 MG: 50 TABLET, FILM COATED ORAL at 14:30

## 2021-07-10 RX ADMIN — CIPROFLOXACIN HYDROCHLORIDE 500 MG: 500 TABLET, FILM COATED ORAL at 16:39

## 2021-07-10 ASSESSMENT — ENCOUNTER SYMPTOMS
SHORTNESS OF BREATH: 1
VOMITING: 1
COUGH: 0
DYSURIA: 0
CHILLS: 1
ABDOMINAL PAIN: 1
FEVER: 0
NAUSEA: 1

## 2021-07-10 ASSESSMENT — MIFFLIN-ST. JEOR
SCORE: 1745.93
SCORE: 1745.65

## 2021-07-10 NOTE — ED NOTES
Report given to EMS, patient left for group home with all belongings. Discharge instructions reviewed with patient, prescription given, patient verbalized understanding.

## 2021-07-10 NOTE — ED NOTES
7/10/2021  Sadaf Ross 2000     [facility] Crisis Assessment:    Started at: 3:20pm   Completed at: 4:05pm  Patient was assessed via in-person.    Chief Complaint and History of Presenting Problem:  Patient is a 20 year old  female who presented to the ED by Medics related to concerns for abdominal pain and suicidal ideation. The pt states she had her group home staff call 911 so she could be seen at the hospital.  The pt has been medically evaluated and medically cleared.  The pt reports increased suicidal ideation over the past three days.  States that stressors include her father's death 2/21, her girlfriend is moving out of their group home into another group home and her sisters and mother have been avoiding her.  The pt reports suicidal ideation with a plan to cut her neck.  The pt states she cannot commit to safety outside of the hospital.      This is the pt's 5 emergency room visit in July 2021.  The pt was seen at this hospital yesterday for a pseudo-seizure and suicidal ideation.  The pt had a DEC assessment and at that time, was able to commit to safety and discharged back to her group home with the recommendations to follow up with her outpatient providers.  The pt diagnosed with UTI during yesterday's ED visit and prescribed Cipro which according to the , Arlene, the pt has not started.      The pt has been living in the [facility] since 12/2020.  There is staff present at the group home 24/7. The pt is her own legal guardian and has multiple outpatient supports including case management, PCP, outpt psychiatry and therapist.  The pt reports she is currently attending DBT.   The pt has a hx of hospitalizations.    The pt engages in SIB by biting her hand while in the ED.  The pt has required a 1:1 ED staff member.  The pt has been given a dose of her prescribed hydroxyzine while in the ED with the hopes of decreasing SIB.    Spoke to Arlene  at [phone].  She states the pt returned to the group home around 3am this morning and went to bed.  She states the pt woke up this morning complaining that she had abdominal pain and vomited.  Arlene states that the pt had flushed the toilet before group home staff could verify the pt had vomited.      Psychotherapy techniques or interventions utilized throughout assessment include: Establishing rapport, Active listening, Apply solution-focused therapy to address current crisis, Identify additional supports and alternative coping skills and Other: Attempted to identify alternatives for resolving today's crisis.    Background  Patient lives with others in a [facility] and is their own legal guardian. Patient is not currently employed. Patient receives additional income from Disability:  Patient is not currently a student. Patient is not a  member or .       Mental Health History and Current Symptoms   Patient identifies historical diagnoses of bipolar disorder and borderline personality disorder. At baseline, patient describes their mental health symptoms as low mood, sad, suicidal, lonely.     Current Providers  Primary Care Provider: Yes Jess Wilkins MD   [facility] in [place]  Psychiatrist: Yes    [facility]ina CNS, [facility]    [facility]   [phone]  Therapist: Yes   Carlos Bullock [facility]  [phone]  : Yes  Jyoti Montenegror with R  [facility]: No  ACT Team: No  Other: No    Has an DANDY been signed? No; Pt refused to sign the release    History of psychiatric hospitalizations? Yes  Pt has a hx of hospitalizations.  Most recent hospitalization was at [facility] 5/2021.  History of civil commitment? No  History of programmatic care? Yes  The pt reports she is current in DBT  Current psychotropic medications? Yes Lithium, hydroxyzine and trazedone.  Medication Compliant? Yes  Recent medication changes? No    Relevant Medical Concerns  Patient identifies concerns with  completing ADLs? No  Patient can ambulate independently? No  Other medical health concerns?  Yes-pt diagnosed with UTI during yesterday's ED visit and prescribed Cipro.    History of concussion or TBI? No     Abuse, Maltreatment, and Trauma History  Physical abuse: No  Emotional/psychological abuse: No  Sexual abuse: No  Loss of a friend or family member to suicide: No  Other identified traumatic event or significant stressor: Yes    Current Symptoms  Attention, Hyperactivity, and Impulsivity: Yes: Impulsive   Anxiety:Yes: Generalized Symptoms: Avoidance and Somatic symptoms - abdominal pain, headache, or tension    Behavioral Difficulties: Yes: Impulsivity/Disinhibition   Mood Symptoms: Yes: Impaired decision making , Sad, depressed mood , Somatic complaints and Thoughts of suicide/death    Appetite: No   Feeding and Eating: No  Interpersonal Functioning: Yes: Cognitive Distortions, Emotional Deregulation and Impaired Impulse Control  Learning Disabilities/Cognitive/Developmental Disorders: Yes: Functional Impairments and Self-Regulation   General Cognitive Impairments: Yes: Decision-Making and Judgment/Insight  If yes, see completed Mini-Cog Assessment below.  Sleep: No   Psychosis: No    Trauma: No     Substance Use  Patient does  have a history of substance use which includes Substance #1: Name(s): hx of using meth, cannabis and alcohol.  Since the pt has lived in her group home, has not been using.  Frequency: unk Quantity: unk Duration: unk Last use: unk Method: unk.. Patient has not participated in chemical dependency treatment or detox.      Patient has recently completed a drug screen or BAL/Breathalyzer? Yes UTox negative for all screened on 7/9/21    History of Suicidal Ideation, Suicide Attempts, and Risk Formulation:   Patient does  have a history of suicidal ideation beginning 3 days ago and intensifying. Patient is able to identify triggers to suicidal ideation which include father's death, girlfriend  moving out of group home, family avoiding  her. Patient does  acknowledge a history of suicide attempts. Previous attempts include attempt in 2019 by cutting at her neck. Patient does  have a history of self-injurious behavior. Patient identifies self-injury via the following methods: Pt reports hx of SIB by biting  her hand.  The pt has been biting her hand while in the ED.    ESS-6  1.a. Over the past 2 weeks, have you had thoughts of killing yourself? Yes   1.b. Have you ever attempted to kill yourself and, if yes, when did this last happen? Yes Last attempt in 2019 by cutting her neck.  2. Recent or current suicide plan? Yes  3. Recent or current intent to act on ideation? Yes  4. Lifetime psychiatric hospitalization? Yes  5. Pattern of excessive substance use? No  6. Current irritability, agitation, or aggression? Yes  ESS-6 Score: 5/6  The pt's score falls in the High Risk category as she endorses suicidal plan and intent.    Patient identifies current suicidal ideation which includes plan to cut her neck. Patient reports onset of ideation was three days ago, frequency is continuous ,  duration is constant, and intensity is severe. Patient does  have an identified plan for suicide which includes cutting herself on the neck. Patient does think this method would result in their death. Patient reports they do have intent to carry out this plan at this time.      Current risk factors for suicide include history of suicide attempt(s), recent death of loved one, poor interpersonal relationships, helplessness/hopelessness and impulsivity/recklessness. Protective factors against suicide include community support and Pt lives in a group home..    Other Risk Areas  Aggressive/assumptive/homicidal risk factors: No   Duty to warn?No   Was a Child Protection Report Made? No   Was a Adult Protection Report Made? No      Sexually inappropriate behavior? No      Vulnerability to sexual exploitation? No     Mental Status  Exam:  Affect: Flat  Appearance: Appropriate   Attention Span/Concentration: Attentive    Eye Contact: Engaged  Fund of Knowledge: Appropriate   Language /Speech Content: Fluent  Language /Speech Volume: Normal   Language /Speech Rate/Productions: Normal   Recent Memory: Intact  Remote Memory: Intact  Mood: Depressed   Orientation:   Person: Yes   Place: Yes  Time of Day: Yes   Date: Yes   Situation (Do they understand why they are here?): Yes   Psychomotor Behavior: Other: Pt engaged in SIB  by biting her hand  while in the ED   Thought Content: Clear  Thought Form: Intact    Assessments and Screeners  Mini-Cog Assessment  Instructions:  1. Ask the patient to listen carefully and remember three unrelated words (ex. Table, apple, tatyana).  2. Ask the patient to draw a clock: first the [place], then the numbers, then the hands at a specific time (ex. 11:10am).  3. Ask the patient to repeat the three words.    Number of Words Recalled: 0  Clock-Drawing Test: 0 (Abnormal)   Mini-Cog Total Score: This was not completed by this writer.   Details:    Clinical Summary and Disposition  Clinical summary: Due to the pt's increasing suicidal ideation with a plan to cut neck and  Inability to commit to safety, recommend  inpt hospitalization for safety and stabilization.  The pt is in agreement and will sign herself into the hospital.  The pt has very poor coping skills and a number of stressors including father's death 2/21 and girlfriend moving out of her group home.  When the pt was seen yesterday, she was able to commit to safety and despite repeated attempts today, the pt was not able to engage in safety planning.  Patient's strengths, protective factors, and community resources include PCP, outpt therapist and psychiatry, case management, lives in a group home. Patient's coping skills include listening to music, puzzles and cooking. Areas of vulnerability for this patient are poor coping, recent loss, disconnection from  family, girlfriend moving. Provider's current assessment of risk includes increased risk due to recent loss and poor coping.     Diagnosis:   F31.32  Bipolar Disorder most recent episode depressed  F60. 3 Borderline Personality Disorder  F70  Intellectual Disability (intellectual developmental disorder, mild)    Disposition:  Attending provider, Dr Negron consulted and does  agree with recommended disposition which includes Inpatient Mental Health. Patient agrees with recommended level of care. Pt to sign herself into the hospital.     Details of final disposition include: Inpatient mental health .  Bed placement pending     If Inpatient, is patient admitted voluntary? Yes   Patient aware of potential for transfer if there is not appropriate placement? Yes  Patient is willing to travel outside of the [facility] for placement? No   Central Intake Notified? Yes: Date: 7/10/21 Time: 4:30pm    Safety and After Care Planning:        Safety Plan Provided? No    Follow-Up Plans and Providers: Recommend pt return to group home and outpatient providers of psychiatry and DBT upon discharge.    Follow-Up Plan:  After care plan provided to the patient/guardian by: N/A  After care plan provided to any additional sources/parties? No    Duration of face to face time with patient in minutes: .75 hrs    CPT code(s) utilized: 32722 - Psychotherapy for Crisis - 60 (30-74*) min      Nuria Deleon, LÁZAROSW

## 2021-07-10 NOTE — DISCHARGE INSTRUCTIONS
Please make an appointment to follow up with Your Primary Care Provider as soon as possible.    Take the antibiotic (cipro) as prescribed for possible bladder infection.     Return to the ED if you develop any new or worsening concerns.

## 2021-07-10 NOTE — ED NOTES
Patient was signed out to me by Dr. Negron pending laboratory evaluation and imaging studies after possible syncopal episode at her primary care clinic with report of abdominal pain, vomiting, and suicidal ideation. Patient has extensive psychiatric history and do question what the event was at the clinic (no records) and whether it was a true loss of consciousness. Please see the prior physician's note for full history and physical exam and prior ED course. Upon assumption of care, patient was awaiting laboratory studies and chest and abdominal imaging.  Her provider had ordered an EKG which was reportedly without acute ischemia or arrhythmia other than some mild sinus bradycardia and intervals were normal.  She had received IV fluids, GI cocktail, and IV Zofran.  Prior provider had obtained a D-dimer which was pending prior to ordering chest imaging.  This was within normal limits making PE unlikely.  She does not have any known cardiac risk factors or known family history of sudden death or cardiac disease.  She had been complaining of multiple different complaints per the prior provider making assessment somewhat difficult to narrow adjoining cause.  She had also been reporting suicidal ideation and had been at the [facility] emergency department 3 times in the past week with suicidal ideation and report of hallucinations and had had multiple evaluations and was reported to have stress at her group home including difficulty with some of the members that may have been worsening the symptoms.  She had reported suicidal ideation here and thus is also awaiting a behavioral health assessment.  She had also been reporting seeing visual hallucinations of her father who passed away in February.  On my evaluation she was resting comfortably and had no complaints.  She did not appear to be responding to internal stimuli on my evaluation.  Laboratory studies were largely unremarkable with the exception of a slightly  "elevated white blood cell count of 12.  Troponin was less than 0.015 and with her reassuring EKG felt that cardiac etiology was less likely.  This may have been a vagal episode or potentially could have been a pseudoseizure-like episode with her psychiatric history.  Lipase was within normal limits.  Lactic acid was within normal limits at 0.7 and her vital signs were within normal limits here and she was afebrile without any signs of sepsis.  Lithium level is low and thus lithium toxicity would be unlikely.  CMP is also largely unremarkable with the exception of an ALT of 53 slightly above the cutoff of 50.  The remainder of her CMP was unremarkable with normal electrolytes and renal function.  She denies any asked to harm herself or any drug ingestions and later reported to the behavioral health  that the suicidal ideation was fleeting and that she was without a specific plan but had reportedly thought of slitting her throat earlier in the week during one of her [facility] evaluations but was no longer feeling this way.  Urine drug screen is negative.  Pregnancy test is negative.  Urinalysis reveals moderate leukocyte esterase with 6 white blood cells and 0 red blood cells.  This was sent for culture.  Did treat her for the potential of UTI as she has a slightly elevated white blood cell count and abdominal symptoms.  CT of the abdomen and pelvis was unremarkable with the exception of a small left ovarian cyst.  Chest x-ray was performed which was also unremarkable.  She had not had any vomiting since she had been here in the emergency department.      She had been here multiple hours awaiting dec assessment I was called into the room for a possible \"seizure\".  On arrival the patient was \"shaking\" but was awake and alert and answering questions and squeezing the nurses hand and complaining of abdominal pain.  At this point, patient had an extensive work-up without signs of pathology for this abdominal " pain and I did wonder about the potential of a psychosomatic cause but with the report of a left ovarian cyst and now her report of worsening pain we did obtain a pelvic ultrasound which revealed a dominant follicle but no signs of torsion or other acute pathology.  Patient was given Toradol and Zyprexa and was feeling much improved on reevaluation.  She was seen by the behavioral health 's who reported she did contract for safety and she was requesting to go back to her group home.  She was no longer feeling suicidal and not having any obvious signs of hallucination here.  She was resting quietly on my reevaluation and confirm that she would like to go home.  I am uncertain of the exact cause of the events of the previous day but do question whether she could have potentially had a similar episode at the clinic that I witnessed here. Also question a possible viral syndrome.  Do feel that she should follow closely with her primary care provider and she was given a 3-day course of ciprofloxacin for possible UTI and to return for any new or worsening concerns.  She voiced understanding and was comfortable with this plan.  The group home was also comfortable with this plan.  She was discharged to the group home in stable condition.    MD Herminia Le Ashley A, MD  07/10/21 [id number]

## 2021-07-10 NOTE — ED PROVIDER NOTES
"    [facility] EMERGENCY DEPARTMENT ([facility])  7/10/21    History     Chief Complaint   Patient presents with     Abdominal Pain     Nausea and vomiting.  Pt was seen here yesterday when sent in from clinic.  Does not feel any better.     The history is provided by the patient and medical records.     Sadaf Ross is a 20 year old female with a medical history significant for bipolar 1 disorder, borderline personality disorder, depression, intellectual disability, and suicidal ideations who presents to the emergency department for evaluation of nausea and vomiting. She denies improvement in her symptoms from her visit yesterday.  She notes a syncopal episode at her group home today in which she collapsed onto a couch.  She felt cold and \"crummy\" immediately before this episode. She is unable to recall what happened after collapsing. Patient endorses nausea, 3 episodes of vomiting, abdominal pain, chest pain, and shortness of breath. She denies incontinence.     Per DEC , patient reports multiple stressors: her father  in February, the rest of her family (mother and two sisters) are avoiding her, and her girlfriend is moving out of the group home that she is staying at.  Patient states she has been biting herself as self injury, stating that it makes her feel calmer.  Patient endorses chronic suicidal ideation.  She says it is worse than usual and she doesn't feel safe being discharged    Per chart review, patient was evaluated at [facility] ED on 2021 following a pseudoseizure event at her [facility] clinic visit. Patient vomited at home 7 times and was unable to recall events at clinic. She endorsed chest pain and shortness of breath. She has not been taking her medications for the past two weeks as she does not think that they work. Patient was given Zofran and IV fluids. Labs were unremarkable. Negative UPT and UA.     CT Abdomen Pelvis w Contrast 2021  IMPRESSION:   1.  No " acute findings in the abdomen and pelvis.  2.  Left ovarian 3.0 cm physiologic dominant follicle/simple cyst.  Pelvic ultrasound can be considered for confirmation.  3.  Possible patchy hepatic steatosis.  A 1.0 cm indeterminate  hypodense lesion in the liver could represent focal fat or a  hemangioma. Liver MRI on an outpatient basis could be considered for  better characterization.    XR Chest 2 VW 7/9/2021  IMPRESSION: Clear lungs. No pleural effusion or pneumothorax. The  cardiac and mediastinal silhouettes are normal.    US Pelvis Complete 7/9/2021  IMPRESSION:   1. Normal pelvic ultrasound. Dominant follicle left ovary.  2. No suggestion for torsion with arterial and venous waveforms identified within each ovary.     Past Medical History:   Diagnosis Date     ADHD (attention deficit hyperactivity disorder)      Bipolar 1 disorder (H)      Borderline personality disorder (H)      Depression      Depressive disorder      Intellectual disability      Obesity      Syncope        Past Surgical History:   Procedure Laterality Date     APPENDECTOMY       APPENDECTOMY         No family history on file.    Social History     Tobacco Use     Smoking status: Current Some Day Smoker     Years: 5.00     Types: Cigarettes     Smokeless tobacco: Never Used   Substance Use Topics     Alcohol use: No       Current Facility-Administered Medications   Medication     ciprofloxacin (CIPRO) tablet 500 mg     ciprofloxacin (CIPRO) tablet 500 mg     lithium (ESKALITH) capsule 150 mg     [START ON 7/11/2021] omeprazole (priLOSEC) CR capsule 40 mg     traZODone (DESYREL) tablet 50 mg     [START ON 7/11/2021] venlafaxine (EFFEXOR-XR) 24 hr capsule 300 mg     Current Outpatient Medications   Medication     ciprofloxacin (CIPRO) 500 MG tablet     acetaminophen (TYLENOL) 650 MG CR tablet     alum & mag hydroxide-simethicone (MAALOX) 200-200-20 MG/5ML SUSP suspension     ARIPiprazole ER (ABILIFY MAINTENA) 400 MG extended release inj syringe      calcium carbonate (TUMS) 500 MG chewable tablet     chlorhexidine (CHLORHEXIDINE) 0.12 % solution     ciprofloxacin (CIPRO) 500 MG tablet     docusate sodium (COLACE) 100 MG tablet     hydrOXYzine (ATARAX) 25 MG tablet     ibuprofen (ADVIL/MOTRIN) 400 MG tablet     lithium (ESKALITH) 150 MG capsule     naltrexone (DEPADE/REVIA) 50 MG tablet     norgestimate-ethinyl estradiol (SPRINTEC 28) 0.25-35 MG-MCG tablet     omeprazole (PRILOSEC) 40 MG DR capsule     polyethylene glycol (MIRALAX) 17 g packet     traZODone (DESYREL) 50 MG tablet     venlafaxine (EFFEXOR-XR) 150 MG 24 hr capsule     Vitamin D, Cholecalciferol, 25 MCG (1000 UT) TABS        Allergies   Allergen Reactions     Penicillins Rash and Unknown        Review of Systems   Constitutional: Positive for chills. Negative for fever.   Respiratory: Positive for shortness of breath. Negative for cough.    Cardiovascular: Positive for chest pain.   Gastrointestinal: Positive for abdominal pain, nausea and vomiting.   Genitourinary: Negative for dysuria and enuresis.   Neurological: Positive for syncope.   All other systems reviewed and are negative.    A complete review of systems was performed with pertinent positives and negatives noted in the HPI, and all other systems negative.    Physical Exam   BP: 104/57  Pulse: 69  Temp: 98.2  F (36.8  C)  Resp: 20  SpO2: 100 %  Physical Exam    GEN:  Well developed, no acute distress  HEENT:  EOMI, Mucous membranes are moist.   Cardio:  RRR, no murmur, radial pulses equal bilaterally  PULM:  Lungs clear, good air movement, no wheezes, rales   Abd:  Soft, normal bowel sounds, no focal tenderness  Musculoskeletal:  normal range of motion, no lower extremity swelling or calf tenderness  Neuro:  Alert and oriented X3, Follows commands, moving all extremities spontaneously   Skin:  Warm, dry  Psychiatric: Has suicidal ideation without a plan, no homicidal ideation, no hallucinations    ED Course     12:33 PM  The patient  was seen and examined by Emma Negron MD in Room ED05.     Procedures         Patient had labs done both yesterday and today.  These were reviewed and results are shown below.     Results for orders placed or performed during the hospital encounter of 07/10/21   Hemoglobin     Status: None   Result Value Ref Range    Hemoglobin 12.6 11.7 - 15.7 g/dL   Basic metabolic panel     Status: Abnormal   Result Value Ref Range    Sodium 138 133 - 144 mmol/L    Potassium 3.9 3.4 - 5.3 mmol/L    Chloride 110 (H) 94 - 109 mmol/L    Carbon Dioxide 25 20 - 32 mmol/L    Anion Gap 3 3 - 14 mmol/L    Glucose 82 70 - 99 mg/dL    Urea Nitrogen 8 7 - 30 mg/dL    Creatinine 0.77 0.52 - 1.04 mg/dL    GFR Estimate >90 >60 mL/min/[1.73_m2]    GFR Estimate If Black >90 >60 mL/min/[1.73_m2]    Calcium 8.9 8.5 - 10.1 mg/dL   Glucose by meter     Status: None   Result Value Ref Range    Glucose 93 70 - 99 mg/dL   Troponin POCT     Status: None   Result Value Ref Range    Troponin I 0.00 0.00 - 0.08 ug/L   Results for orders placed or performed during the hospital encounter of 07/09/21   XR Chest 2 Views     Status: None    Narrative    XR CHEST 2 VW 7/9/2021 9:27 PM    HISTORY: chest pain, loss of consciousness, eval for pathology    COMPARISON: None.      Impression    IMPRESSION: Clear lungs. No pleural effusion or pneumothorax. The  cardiac and mediastinal silhouettes are normal.    COLETTE ALVAREZ MD         SYSTEM ID:  [id number]   CT Abdomen Pelvis w Contrast     Status: None    Narrative    CT ABDOMEN PELVIS W CONTRAST 7/9/2021 7:50 PM    CLINICAL HISTORY: leukocytosis, abdominal pain, vomiting, eval for  appendicitis, diverticulitis, kidney stone, etc    TECHNIQUE: CT scan of the abdomen and pelvis was performed following  injection of IV contrast. Multiplanar reformats were obtained. Dose  reduction techniques were used.  CONTRAST: 100ml isovue 370    COMPARISON: None available.    FINDINGS:   LOWER CHEST: Mild dependent atelectasis  in the lung bases.    HEPATOBILIARY: 1.6 cm hypodense lesion in the posterior-inferior right  hepatic lobe (series 3, image 147) is indeterminate. Possible patchy  hepatic steatosis. No calcified gallstones or biliary ductal  dilatation.    PANCREAS: Normal.    SPLEEN: Normal.    ADRENAL GLANDS: Normal.    KIDNEYS/BLADDER: Normal.    BOWEL: No small bowel or colonic obstruction. No colonic  diverticulosis. No inflammatory changes. Appendectomy.    PELVIC ORGANS: Left ovarian 3.0 x 2.8 cm cystic lesion is likely a  dominant left ovarian follicle.    ADDITIONAL FINDINGS: No lymphadenopathy. Trace pelvic free fluid. No  intra-abdominal fluid collections or free air.    MUSCULOSKELETAL: Bilateral osteitis condensans ilii. No suspicious  lesions in the bones.      Impression    IMPRESSION:   1.  No acute findings in the abdomen and pelvis.  2.  Left ovarian 3.0 cm physiologic dominant follicle/simple cyst.  Pelvic ultrasound can be considered for confirmation.  3.  Possible patchy hepatic steatosis.  A 1.0 cm indeterminate  hypodense lesion in the liver could represent focal fat or a  hemangioma. Liver MRI on an outpatient basis could be considered for  better characterization.    COLETTE ALVAREZ MD         SYSTEM ID:  [id number]   US Pelvis Cmplt w Transvag & Doppler LmtPel Duplex Limited     Status: None    Narrative    EXAM: US PELVIS COMPLETE W TRANSVAGINAL AND DOPPLER LIMITED  LOCATION: [facility]  DATE/TIME: 7/9/2021 10:19 PM    INDICATION: Pelvic pain. Evaluate for torsion.  COMPARISON: CT from earlier today.  TECHNIQUE: Transabdominal scans were performed. Color flow with spectral Doppler and waveform analysis performed.    FINDINGS:    UTERUS: 8.2 x 3.2 cm. Normal in size and position with no masses.    ENDOMETRIUM: 6 mm. Normal smooth endometrium.    RIGHT OVARY: 2.8 x 1.9 cm. Normal with arterial and venous duplex flow identified.    LEFT OVARY: 3.8 x 2.7 cm containing a 2.8 x 1.9 cm follicle or  small simple appearing cyst. Normal with arterial and venous duplex flow identified.    No significant free fluid.      Impression    IMPRESSION:    1.  Normal pelvic ultrasound. Dominant follicle left ovary.  2.  No suggestion for torsion with arterial and venous waveforms identified within each ovary.         Drug abuse screen 6 urine (tox)     Status: None   Result Value Ref Range    Amphetamine Qual Urine Negative NEG^Negative    Barbiturates Qual Urine Negative NEG^Negative    Benzodiazepine Qual Urine Negative NEG^Negative    Cannabinoids Qual Urine Negative NEG^Negative    Cocaine Qual Urine Negative NEG^Negative    Ethanol Qual Urine Negative NEG^Negative    Opiates Qualitative Urine Negative NEG^Negative   HCG qualitative urine     Status: None   Result Value Ref Range    HCG Qual Urine Negative NEG^Negative   CBC with platelets differential     Status: Abnormal   Result Value Ref Range    WBC 12.1 (H) 4.0 - 11.0 10e9/L    RBC Count 4.76 3.8 - 5.2 10e12/L    Hemoglobin 13.3 11.7 - 15.7 g/dL    Hematocrit 41.0 35.0 - 47.0 %    MCV 86 78 - 100 fl    MCH 27.9 26.5 - 33.0 pg    MCHC 32.4 31.5 - 36.5 g/dL    RDW 12.8 10.0 - 15.0 %    Platelet Count 301 150 - 450 10e9/L    Diff Method Automated Method     % Neutrophils 70.4 %    % Lymphocytes 23.4 %    % Monocytes 4.1 %    % Eosinophils 1.1 %    % Basophils 0.5 %    % Immature Granulocytes 0.5 %    Nucleated RBCs 0 0 /100    Absolute Neutrophil 8.5 (H) 1.6 - 8.3 10e9/L    Absolute Lymphocytes 2.8 0.8 - 5.3 10e9/L    Absolute Monocytes 0.5 0.0 - 1.3 10e9/L    Absolute Eosinophils 0.1 0.0 - 0.7 10e9/L    Absolute Basophils 0.1 0.0 - 0.2 10e9/L    Abs Immature Granulocytes 0.1 0 - 0.4 10e9/L    Absolute Nucleated RBC 0.0    Comprehensive metabolic panel     Status: Abnormal   Result Value Ref Range    Sodium 138 133 - 144 mmol/L    Potassium 4.0 3.4 - 5.3 mmol/L    Chloride 107 94 - 109 mmol/L    Carbon Dioxide 26 20 - 32 mmol/L    Anion Gap 5 3 - 14 mmol/L     Glucose 87 70 - 99 mg/dL    Urea Nitrogen 9 7 - 30 mg/dL    Creatinine 0.75 0.52 - 1.04 mg/dL    GFR Estimate >90 >60 mL/min/[1.73_m2]    GFR Estimate If Black >90 >60 mL/min/[1.73_m2]    Calcium 9.7 8.5 - 10.1 mg/dL    Bilirubin Total 0.3 0.2 - 1.3 mg/dL    Albumin 4.4 3.4 - 5.0 g/dL    Protein Total 8.6 6.8 - 8.8 g/dL    Alkaline Phosphatase 68 40 - 150 U/L    ALT 53 (H) 0 - 50 U/L    AST 27 0 - 45 U/L   Lipase     Status: None   Result Value Ref Range    Lipase 271 73 - 393 U/L   Troponin I     Status: None   Result Value Ref Range    Troponin I ES <0.015 0.000 - 0.045 ug/L   D dimer quantitative     Status: None   Result Value Ref Range    D Dimer 0.3 0.0 - 0.50 ug/ml FEU   Lactic acid whole blood     Status: None   Result Value Ref Range    Lactic Acid 0.7 0.7 - 2.0 mmol/L   UA with Microscopic reflex to Culture     Status: Abnormal    Specimen: Unspecified Urine   Result Value Ref Range    Color Urine Orange     Appearance Urine Cloudy     Glucose Urine Negative NEG^Negative mg/dL    Bilirubin Urine Negative NEG^Negative    Ketones Urine Negative NEG^Negative mg/dL    Specific Gravity Urine 1.020 1.003 - 1.035    Blood Urine Trace (A) NEG^Negative    pH Urine 5.5 5.0 - 7.0 pH    Protein Albumin Urine 10 (A) NEG^Negative mg/dL    Urobilinogen mg/dL Normal 0.0 - 2.0 mg/dL    Nitrite Urine Negative NEG^Negative    Leukocyte Esterase Urine Moderate (A) NEG^Negative    Source Unspecified Urine     WBC Urine 6 (H) 0 - 5 /HPF    RBC Urine 0 0 - 2 /HPF    Bacteria Urine None (A) NEG^Negative /HPF    Squamous Epithelial /HPF Urine 0 0 - 1 /HPF    Mucous Urine Present (A) NEG^Negative /LPF   Lithium level     Status: Abnormal   Result Value Ref Range    Lithium Level <0.20 (L) 0.60 - 1.20 mmol/L   EKG 12 lead     Status: None (Preliminary result)   Result Value Ref Range    Interpretation ECG Click View Image link to view waveform and result    Urine Culture Aerobic Bacterial     Status: None (Preliminary result)     Specimen: Midstream Urine   Result Value Ref Range    Specimen Description Midstream Urine     Special Requests Specimen received in preservative     Culture Micro PENDING      Medications   ciprofloxacin (CIPRO) tablet 500 mg (has no administration in time range)   ciprofloxacin (CIPRO) tablet 500 mg (has no administration in time range)   venlafaxine (EFFEXOR-XR) 24 hr capsule 300 mg (has no administration in time range)   lithium (ESKALITH) capsule 150 mg (has no administration in time range)   traZODone (DESYREL) tablet 50 mg (has no administration in time range)   omeprazole (priLOSEC) CR capsule 40 mg (has no administration in time range)   ondansetron (ZOFRAN-ODT) ODT tab 8 mg (8 mg Oral Given 7/10/21 1309)   0.9% sodium chloride BOLUS (1,000 mLs Intravenous New Bag 7/10/21 1431)   hydrOXYzine (ATARAX) tablet 50 mg (50 mg Oral Given 7/10/21 1430)   hydrOXYzine (ATARAX) tablet 50 mg (50 mg Oral Given 7/10/21 1528)        Assessments & Plan (with Medical Decision Making)   This is the patient's second ED visit to this emergency department in 2 days and she had been seen at [facility] emergency department 3 times in the last week as well.  She is reporting that her suicidal ideation is much worse than usual.  She has been trying to bite herself in the emergency department today.  Patient is not doing well as an outpatient and I think would benefit from inpatient psychiatric admission.  Her medical work-up has been unrevealing except for possible urinary tract infection.  Since she has had some nausea and vomiting, we will treat for urinary tract infection with Cipro.    I have reviewed the nursing notes. I have reviewed the findings, diagnosis, plan and need for follow up with the patient.    New Prescriptions    CIPROFLOXACIN (CIPRO) 500 MG TABLET    Take 1 tablet (500 mg) by mouth 2 times daily for 3 days       Final diagnoses:   Non-intractable vomiting with nausea, unspecified vomiting type   At risk for  self-inflicted injury   Urinary tract infection without hematuria, site unspecified   Suicidal ideation     I, Zoë Dumont, am serving as a trained medical scribe to document services personally performed by Emma Negron MD based on the provider's statements to me on July 10, 2021.  This document has been checked and approved by the attending provider.    IEmma MD, was physically present and have reviewed and verified the accuracy of this note documented by Zoë Dumont, medical scribe.      --  Emma Negron MD  [facility] EMERGENCY DEPARTMENT  7/10/2021     Emma Negron MD  07/10/21 2761

## 2021-07-10 NOTE — ED NOTES
Bed: ED05  Expected date: 7/10/21  Expected time: 11:30 AM  Means of arrival:   Comments:  Fadi 711: 19 yo female UTI possibly septic

## 2021-07-10 NOTE — ED NOTES
Writer was called into the pt's room due to pt having a seizure, upon arrival in the pt's room, pt was laying on her L side, whole body was shaking. When pt's name was called and writer was holding pt's hands, pt squeezed writers hand, pt was following writer instruction. MD arrived in the pt room, lots of reassurance rendered. Finally pt's body stopped shaking, pt then stated that she is having a lot of abd pain, MD ordered toradol, oral zyprexa and US. Pt then calmed down.

## 2021-07-10 NOTE — TELEPHONE ENCOUNTER
Telephone Encounter by Veto Pelaez RN at 7/10/2021  5:52 PM     Author: Veto Pelaez, RN Service: -- Author Type: Coordinator    Filed: 7/10/2021  6:03 PM Encounter Date: 7/10/2021 Status: Signed    : Veto Pelaez RN (Coordinator)       S: 4:08p, Nuria w/ DEC called w/ clinical on a 20y/F present in the [facility] presenting w/ SI w/ a plan.     B:  The pt is currently at the [facility] presenting w/ SI w/ a plan. Plan includes to cut her neck. The pt did identify triggers: father  in 2021, the pts gf is moving, the pts mother & sisters are avoiding the pt. The pt is engaging in SIB in the ER- the pt is biting her hand. The pt has been to the ER 5x since 2021.     The pt does not endorse using substances- the pt does have a Hx of substance use opiates and cannibals.     The pt has been IP for MH- May 2021 4500.    The pts MH Hx is bipolar I, borderline personality disorder and a mild intellectual developmental disorder.     The pt is Rx MH medications- unknown at this time. Pt is compliant due to group home.     The pts medication management provider is  (Unknown Last Name), NP through [facility].     The pts does have a therapist, Carlos Ortez, unknown location.    There is no concern for aggression. There is no concern for HI.     The pt has been cleared medically and can ambulate independently.     Covid needs to be ordered.   Utox resulted as negative from yesterdays ER visit.   Pregnancy Test  resulted as negative from yesterdays ER visit.     A: Vol    R: The pt is currently in the [facility] awaiting bed placement.     4:26p Intake awaiting Covid for bed placement. The pt has been added to the work-list. Intake identifying appropriate bed placement.     5:39p Intake checked on labs for bed placement. Covid resulted as negative.     5:42p Intake called provider to present for [facility] (Jo). Provider did not answer. Intake left a voicemail for the  provider to return the call to intake.     5:49p Provider called intake back, the provider accepts the pt.    5:56p Intake called [facility] Charge RN to go over admission in que. Charge RN request paperwork be faxed.     6:01p Intake called [facility] to go over bed placement information.

## 2021-07-10 NOTE — SAFE
Sadaf Ross  July 10, 2021    Pt to be admitted for safety and stabilization due to suicidal ideation with the plan to cut her neck.  The pt states she cannot commit to safety despite living in a group home with 24/7 staff.  The pt reports stressors include her father's death 2/21, girlfriend moving out of the group home to another group home and her mother and sisters are avoiding her.      Current Suicidal Ideation/Self-Injurious Concerns/Methods: Cutting Pt reports suicidal ideation with a plan to cut her neck.  The pt had an attempt in 2019 by cutting her neck.  The pt has a hx of SIB.  Currently, her SIB involved biting her hand.  The pt engaged in hand biting in the ED and required a 1:1 sitter.    Inappropriate Sexual Behavior: Unknown  No known history.    Aggression/Homicidal Ideation: Agitation/Hyperactivity Pt has a hx of agitation.      For additional details see full DEC assessment.       Nuria Deleon, LÁZAROSW

## 2021-07-10 NOTE — TELEPHONE ENCOUNTER
S: 4:08p, Nuria w/ DEC called w/ clinical on a 20y/F present in the [facility] ER presenting w/ SI w/ a plan.     B:  The pt is currently at the [facility] ER presenting w/ SI w/ a plan. Plan includes to cut her neck. The pt did identify triggers: father  in 2021, the pt s gf is moving, the pt s mother & sisters are avoiding the pt. The pt is engaging in SIB in the ER- the pt is biting her hand. The pt has been to the ER 5x since 2021.     The pt does not endorse using substances- the pt does have a Hx of substance use opiates and cannibals.     The pt has been IP for MH- May 2021 4500.    The pts MH Hx is bipolar I, borderline personality disorder and a mild intellectual developmental disorder.     The pt is Rx MH medications- unknown at this time. Pt is compliant due to group home.     The pt s medication management provider is  (Unknown Last Name), NP through [facility].     The pts does have a therapist, Carlos Ortez, unknown location.    There is no concern for aggression. There is no concern for HI.     The pt has been cleared medically and can ambulate independently.     Covid needs to be ordered.   Utox resulted as negative from yesterdays ER visit.   Pregnancy Test  resulted as negative from yesterdays ER visit.     A: Vol    R: The pt is currently in the [facility] ER awaiting bed placement.     4:26p Intake awaiting Covid for bed placement. The pt has been added to the work-list. Intake identifying appropriate bed placement.     5:39p Intake checked on labs for bed placement. Covid resulted as negative.     5:42p Intake called provider to present for [facility] (Jo). Provider did not answer. Intake left a voicemail for the provider to return the call to intake.     5:49p Provider called intake back, the provider accepts the pt.     5:56p Intake called [facility] Charge RN to go over admission in que.Charge RN request paperwork be faxed.     6:01p Intake called [facility]  to go over bed placement information.

## 2021-07-10 NOTE — PHARMACY-ADMISSION MEDICATION HISTORY
Admission Medication History Completed by Pharmacy    See [facility] Admission Navigator for allergy information, preferred outpatient pharmacy, prior to admission medications and immunization status.     Medication history sources:  [facility] medication list    Pertinent changes made to PTA medication list:  Added: N/A  Deleted: N/A  Changed: N/A    Additional medication history information:   - All meds her  medication list.    Prior to Admission medications    Medication Sig Last Dose Taking? Auth Provider   acetaminophen (TYLENOL) 650 MG CR tablet Take 650 mg by mouth every 6 hours as needed for mild pain or fever   Yes Reported, Patient   alum & mag hydroxide-simethicone (MAALOX) 200-200-20 MG/5ML SUSP suspension Take 20 mLs by mouth once as needed for indigestion  Yes Reported, Patient   ARIPiprazole ER (ABILIFY MAINTENA) 400 MG extended release inj syringe Inject 400 mg into the muscle every 28 days On the 4th of each month  Yes Reported, Patient   calcium carbonate (TUMS) 500 MG chewable tablet Take 1 chew tab by mouth 2 times daily as needed for heartburn   Yes Reported, Patient   chlorhexidine (CHLORHEXIDINE) 0.12 % solution Swish and spit 15 mLs in mouth 2 times daily   Yes Reported, Patient   ciprofloxacin (CIPRO) 500 MG tablet Take 1 tablet (500 mg) by mouth 2 times daily for 3 days  Yes Day Hope MD   ciprofloxacin (CIPRO) 500 MG tablet Take 1 tablet (500 mg) by mouth 2 times daily for 3 days  Yes Emma Negron MD   docusate sodium (COLACE) 100 MG tablet Take 100 mg by mouth 2 times daily  Yes Reported, Patient   hydrOXYzine (ATARAX) 25 MG tablet Take 50 mg by mouth every 8 hours as needed for itching   Yes Reported, Patient   ibuprofen (ADVIL/MOTRIN) 400 MG tablet Take 400 mg by mouth 3 times daily as needed for moderate pain  Yes Reported, Patient   lithium (ESKALITH) 150 MG capsule Take 150 mg by mouth At Bedtime  Yes Reported, Patient   naltrexone (DEPADE/REVIA) 50 MG  tablet Take 50 mg by mouth At Bedtime  Yes Reported, Patient   norgestimate-ethinyl estradiol (SPRINTEC 28) 0.25-35 MG-MCG tablet Take 1 tablet by mouth daily  Yes Reported, Patient   omeprazole (PRILOSEC) 40 MG DR capsule Take 40 mg by mouth daily before breakfast  Yes Reported, Patient   ondansetron (ZOFRAN-ODT) 4 MG ODT tab Take 4 mg by mouth 2 times daily as needed for nausea  Yes Unknown, Entered By History   polyethylene glycol (MIRALAX) 17 g packet Take 1 packet by mouth daily  Yes Reported, Patient   traZODone (DESYREL) 50 MG tablet Take 50 mg by mouth At Bedtime  Yes Reported, Patient   venlafaxine (EFFEXOR-XR) 150 MG 24 hr capsule Take 2 capsules (300 mg) by mouth daily  Yes Jl Connor MD   Vitamin D, Cholecalciferol, 25 MCG (1000 UT) TABS Take 1,000 Units by mouth daily   Yes Reported, Patient     Date completed: 07/10/21    Medication history completed by:   Miguelangel Hurtado, PharmD, BCPS  [facility]  Emergency Department: Ascom *[id number]

## 2021-07-11 LAB
BACTERIA SPEC CULT: NORMAL
Lab: NORMAL
SPECIMEN SOURCE: NORMAL

## 2021-07-11 PROCEDURE — 128N000001 HC R&B CD/MH ADULT

## 2021-07-11 PROCEDURE — 250N000013 HC RX MED GY IP 250 OP 250 PS 637

## 2021-07-11 PROCEDURE — 124N000001 HC R&B MH

## 2021-07-11 PROCEDURE — 99223 1ST HOSP IP/OBS HIGH 75: CPT | Mod: AI | Performed by: PSYCHIATRY & NEUROLOGY

## 2021-07-11 RX ORDER — CHLORHEXIDINE GLUCONATE ORAL RINSE 1.2 MG/ML
15 SOLUTION DENTAL 2 TIMES DAILY
Status: DISCONTINUED | OUTPATIENT
Start: 2021-07-11 | End: 2021-07-19 | Stop reason: HOSPADM

## 2021-07-11 RX ORDER — DOCUSATE SODIUM 100 MG/1
100 CAPSULE, LIQUID FILLED ORAL 2 TIMES DAILY
Status: DISCONTINUED | OUTPATIENT
Start: 2021-07-11 | End: 2021-07-13

## 2021-07-11 RX ORDER — NALTREXONE HYDROCHLORIDE 50 MG/1
50 TABLET, FILM COATED ORAL AT BEDTIME
Status: DISCONTINUED | OUTPATIENT
Start: 2021-07-11 | End: 2021-07-19 | Stop reason: HOSPADM

## 2021-07-11 RX ORDER — NALTREXONE HYDROCHLORIDE 50 MG/1
50 TABLET, FILM COATED ORAL AT BEDTIME
Status: DISCONTINUED | OUTPATIENT
Start: 2021-07-12 | End: 2021-07-11

## 2021-07-11 RX ORDER — IBUPROFEN 200 MG
400 TABLET ORAL 3 TIMES DAILY PRN
Status: DISCONTINUED | OUTPATIENT
Start: 2021-07-11 | End: 2021-07-19 | Stop reason: HOSPADM

## 2021-07-11 RX ORDER — TRAZODONE HYDROCHLORIDE 50 MG/1
50 TABLET, FILM COATED ORAL
Status: DISCONTINUED | OUTPATIENT
Start: 2021-07-11 | End: 2021-07-19 | Stop reason: HOSPADM

## 2021-07-11 RX ORDER — PANTOPRAZOLE SODIUM 40 MG/1
40 TABLET, DELAYED RELEASE ORAL
Status: DISCONTINUED | OUTPATIENT
Start: 2021-07-12 | End: 2021-07-11

## 2021-07-11 RX ORDER — DOCUSATE SODIUM 100 MG/1
100 CAPSULE, LIQUID FILLED ORAL 2 TIMES DAILY
Status: DISCONTINUED | OUTPATIENT
Start: 2021-07-12 | End: 2021-07-11

## 2021-07-11 RX ORDER — TRAZODONE HYDROCHLORIDE 50 MG/1
50 TABLET, FILM COATED ORAL AT BEDTIME
Status: DISCONTINUED | OUTPATIENT
Start: 2021-07-11 | End: 2021-07-19 | Stop reason: HOSPADM

## 2021-07-11 RX ORDER — VENLAFAXINE HYDROCHLORIDE 150 MG/1
300 CAPSULE, EXTENDED RELEASE ORAL DAILY
Status: DISCONTINUED | OUTPATIENT
Start: 2021-07-11 | End: 2021-07-19 | Stop reason: HOSPADM

## 2021-07-11 RX ORDER — PANTOPRAZOLE SODIUM 40 MG/1
40 TABLET, DELAYED RELEASE ORAL
Status: DISCONTINUED | OUTPATIENT
Start: 2021-07-11 | End: 2021-07-19 | Stop reason: HOSPADM

## 2021-07-11 RX ORDER — POLYETHYLENE GLYCOL 3350 17 G/17G
17 POWDER, FOR SOLUTION ORAL DAILY
Status: DISCONTINUED | OUTPATIENT
Start: 2021-07-11 | End: 2021-07-19 | Stop reason: HOSPADM

## 2021-07-11 RX ORDER — VENLAFAXINE HYDROCHLORIDE 150 MG/1
300 CAPSULE, EXTENDED RELEASE ORAL DAILY
Status: DISCONTINUED | OUTPATIENT
Start: 2021-07-12 | End: 2021-07-11

## 2021-07-11 RX ORDER — POLYETHYLENE GLYCOL 3350 17 G/17G
17 POWDER, FOR SOLUTION ORAL DAILY
Status: DISCONTINUED | OUTPATIENT
Start: 2021-07-12 | End: 2021-07-11

## 2021-07-11 RX ORDER — ACETAMINOPHEN 325 MG/1
650 TABLET ORAL EVERY 4 HOURS PRN
Status: DISCONTINUED | OUTPATIENT
Start: 2021-07-11 | End: 2021-07-19 | Stop reason: HOSPADM

## 2021-07-11 RX ORDER — HYDROXYZINE HYDROCHLORIDE 50 MG/1
50 TABLET, FILM COATED ORAL EVERY 4 HOURS PRN
Status: DISCONTINUED | OUTPATIENT
Start: 2021-07-11 | End: 2021-07-19 | Stop reason: HOSPADM

## 2021-07-11 RX ORDER — POLYETHYLENE GLYCOL 3350 17 G/17G
17 POWDER, FOR SOLUTION ORAL DAILY PRN
Status: DISCONTINUED | OUTPATIENT
Start: 2021-07-11 | End: 2021-07-19 | Stop reason: HOSPADM

## 2021-07-11 RX ORDER — HALOPERIDOL 5 MG/1
5 TABLET ORAL EVERY 8 HOURS PRN
Status: DISCONTINUED | OUTPATIENT
Start: 2021-07-11 | End: 2021-07-19 | Stop reason: HOSPADM

## 2021-07-11 RX ORDER — LANOLIN ALCOHOL/MO/W.PET/CERES
3 CREAM (GRAM) TOPICAL
Status: DISCONTINUED | OUTPATIENT
Start: 2021-07-11 | End: 2021-07-19 | Stop reason: HOSPADM

## 2021-07-11 RX ORDER — NORGESTIMATE AND ETHINYL ESTRADIOL 0.25-0.035
1 KIT ORAL DAILY
Status: DISCONTINUED | OUTPATIENT
Start: 2021-07-12 | End: 2021-07-11

## 2021-07-11 RX ORDER — VITAMIN B COMPLEX
1000 TABLET ORAL DAILY
Status: DISCONTINUED | OUTPATIENT
Start: 2021-07-11 | End: 2021-07-19 | Stop reason: HOSPADM

## 2021-07-11 RX ORDER — LITHIUM CARBONATE 150 MG/1
150 CAPSULE ORAL AT BEDTIME
Status: DISCONTINUED | OUTPATIENT
Start: 2021-07-11 | End: 2021-07-15

## 2021-07-11 RX ORDER — CIPROFLOXACIN 500 MG/1
500 TABLET, FILM COATED ORAL 2 TIMES DAILY
Status: COMPLETED | OUTPATIENT
Start: 2021-07-11 | End: 2021-07-13

## 2021-07-11 RX ORDER — CALCIUM CARBONATE 500 MG/1
500 TABLET, CHEWABLE ORAL 2 TIMES DAILY PRN
Status: DISCONTINUED | OUTPATIENT
Start: 2021-07-11 | End: 2021-07-12

## 2021-07-11 RX ORDER — LORAZEPAM 2 MG/1
2 TABLET ORAL EVERY 8 HOURS PRN
Status: DISCONTINUED | OUTPATIENT
Start: 2021-07-11 | End: 2021-07-19 | Stop reason: HOSPADM

## 2021-07-11 RX ORDER — DIPHENHYDRAMINE HYDROCHLORIDE 50 MG/ML
50 INJECTION INTRAMUSCULAR; INTRAVENOUS EVERY 8 HOURS PRN
Status: DISCONTINUED | OUTPATIENT
Start: 2021-07-11 | End: 2021-07-19 | Stop reason: HOSPADM

## 2021-07-11 RX ORDER — DIPHENHYDRAMINE HCL 50 MG
50 CAPSULE ORAL EVERY 8 HOURS PRN
Status: DISCONTINUED | OUTPATIENT
Start: 2021-07-11 | End: 2021-07-19 | Stop reason: HOSPADM

## 2021-07-11 RX ORDER — NORGESTIMATE AND ETHINYL ESTRADIOL 0.25-0.035
1 KIT ORAL DAILY
Status: DISCONTINUED | OUTPATIENT
Start: 2021-07-11 | End: 2021-07-19 | Stop reason: HOSPADM

## 2021-07-11 RX ORDER — LORAZEPAM 2 MG/ML
2 INJECTION INTRAMUSCULAR EVERY 8 HOURS PRN
Status: DISCONTINUED | OUTPATIENT
Start: 2021-07-11 | End: 2021-07-19 | Stop reason: HOSPADM

## 2021-07-11 RX ORDER — CHLORHEXIDINE GLUCONATE ORAL RINSE 1.2 MG/ML
15 SOLUTION DENTAL 2 TIMES DAILY
Status: DISCONTINUED | OUTPATIENT
Start: 2021-07-12 | End: 2021-07-11

## 2021-07-11 RX ORDER — HALOPERIDOL 5 MG/ML
5 INJECTION INTRAMUSCULAR EVERY 8 HOURS PRN
Status: DISCONTINUED | OUTPATIENT
Start: 2021-07-11 | End: 2021-07-19 | Stop reason: HOSPADM

## 2021-07-11 RX ORDER — CIPROFLOXACIN 500 MG/1
500 TABLET, FILM COATED ORAL 2 TIMES DAILY
Status: DISCONTINUED | OUTPATIENT
Start: 2021-07-12 | End: 2021-07-11

## 2021-07-11 RX ADMIN — LITHIUM CARBONATE 150 MG: 150 CAPSULE, GELATIN COATED ORAL at 20:30

## 2021-07-11 RX ADMIN — DOCUSATE SODIUM 100 MG: 100 CAPSULE, LIQUID FILLED ORAL at 20:30

## 2021-07-11 RX ADMIN — PANTOPRAZOLE SODIUM 40 MG: 40 TABLET, DELAYED RELEASE ORAL at 16:56

## 2021-07-11 RX ADMIN — DOCUSATE SODIUM 100 MG: 100 CAPSULE, LIQUID FILLED ORAL at 09:19

## 2021-07-11 RX ADMIN — NALTREXONE HYDROCHLORIDE 50 MG: 50 TABLET, FILM COATED ORAL at 20:29

## 2021-07-11 RX ADMIN — CIPROFLOXACIN HYDROCHLORIDE 500 MG: 500 TABLET, FILM COATED ORAL at 20:29

## 2021-07-11 RX ADMIN — CIPROFLOXACIN HYDROCHLORIDE 500 MG: 500 TABLET, FILM COATED ORAL at 09:19

## 2021-07-11 RX ADMIN — Medication 1000 UNITS: at 09:20

## 2021-07-11 RX ADMIN — POLYETHYLENE GLYCOL 3350 17 G: 17 POWDER, FOR SOLUTION ORAL at 09:18

## 2021-07-11 RX ADMIN — NORGESTIMATE AND ETHINYL ESTRADIOL 1 TABLET: KIT at 09:20

## 2021-07-11 RX ADMIN — TRAZODONE HYDROCHLORIDE 50 MG: 50 TABLET ORAL at 20:29

## 2021-07-11 RX ADMIN — CHLORHEXIDINE GLUCONATE 0.12% ORAL RINSE 15 ML: 1.2 LIQUID ORAL at 20:30

## 2021-07-11 RX ADMIN — PANTOPRAZOLE SODIUM 40 MG: 40 TABLET, DELAYED RELEASE ORAL at 07:05

## 2021-07-11 RX ADMIN — ANTACID TABLETS 500 MG: 500 TABLET, CHEWABLE ORAL at 12:18

## 2021-07-11 RX ADMIN — VENLAFAXINE HYDROCHLORIDE 300 MG: 150 CAPSULE, EXTENDED RELEASE ORAL at 09:19

## 2021-07-11 ASSESSMENT — ACTIVITIES OF DAILY LIVING (ADL)
PATIENT_/_FAMILY_COMMUNICATION_STYLE: SPOKEN LANGUAGE (ENGLISH OR BILINGUAL)
HEARING_DIFFICULTY_OR_DEAF: NO
DIFFICULTY_EATING/SWALLOWING: NO
WEAR_GLASSES_OR_BLIND: NO
WALKING_OR_CLIMBING_STAIRS_DIFFICULTY: NO
CONCENTRATING,_REMEMBERING_OR_MAKING_DECISIONS_DIFFICULTY: YES
DOING_ERRANDS_INDEPENDENTLY_DIFFICULTY: YES
INTERPRETER_SERVICES_OFFERED_TO_THE_PATIENT: NO
DRESSING/BATHING_DIFFICULTY: NO
FALL_HISTORY_WITHIN_LAST_SIX_MONTHS: NO
DIFFICULTY_COMMUNICATING: NO

## 2021-07-11 NOTE — PROGRESS NOTES
Progress Notes by Jaclyn Farley RN at 7/10/2021 11:01 PM     Author: Jaclyn Farley RN Service: -- Author Type: Registered Nurse    Filed: 7/10/2021 11:44 PM Date of Service: 7/10/2021 11:01 PM Status: Signed    : Jaclyn Farley RN (Registered Nurse)       pt is alert and oriented X4. Able to follow through admission processes. She reported back pain rated 8/10, depression 10/10 and anxiety 9/10. She contracted for safety. She reported seeing her dead father. Snack provided on arrival to the unit and sanitary pads provided as pt is currently in her period. Pt placed on 1:1 sitter for SI/SIB. PRN tylenol given . Pt med compliant.

## 2021-07-11 NOTE — PLAN OF CARE
"Patient spent majority of the shift isolative to her room, only coming out for meals and briefly sitting out in the milieu this morning on the lounge chair.  Patient endorsed depression 8 and pain at 7.  Patient declined interventions measures for her pain.  Patient given her scheduled medications for her depression.  Patient endorsed SI without a plan with the reason being, \" I want to see my dad, he  in February.\"  Patient endorsed visual hallucinations, stating, \" I see my dad.\"  Patient could not contract for safety, stating, \" I'm afraid to sleep because I might have a seizure or I might not wake up.\"  Patient verbalized to self harm in the past, stating, \" I bit the inside of my mouth.\"  Patient acknowledge the mouth sore, but refused her mouth rinse medication.  Patient complained of heartburn after lunch, she received PRN TUMS with relief.  Patient slept this afternoon with her 1:1 sitter present for safety.  Problem: Behavioral Health Plan of Care  Goal: Adheres to Safety Considerations for Self and Others  Outcome: No Change  Goal: Absence of New-Onset Illness or Injury  Outcome: No Change     "

## 2021-07-11 NOTE — PLAN OF CARE
Occupational Therapy   AIDET      Patient was introduced to the role of occupational therapy and oriented to the process of occupational therapy services on the unit, including function of groups. Patient was given the Occupational Therapy Questionnaire to complete. Pt in bed on approach. Pt has 1:1 in room.  Patient groggy but agreeable to review form and follow up with OT at a later time. OT will follow up with assessment and address any questions, needs, or concerns.    Therapist: Hiwot Go, OTR  7/11/2021

## 2021-07-11 NOTE — PROGRESS NOTES
Progress Notes by Fernnada Lamar PharmD at 7/10/2021  9:07 PM     Author: Lamar, Fernanda, PharmD Service: Pharmacy Author Type: Pharmacist    Filed: 7/10/2021  9:09 PM Date of Service: 7/10/2021  9:07 PM Status: Signed    : Fernanda Lamar PharmD (Pharmacist)       Pharmacy Note - Admission Medication History    Pertinent Provider Information:     Medication history performed by pharmacist at [facility] prior to transfer. Please see below for their note.    Patient was started on Ciprofloxacin in ED for potential UTI. Last dose received 7/10 @1600; she was prescribed 3 more days of treatment    Thank you for the opportunity to participate in the care of this patient.  Fernanda Lamar PharmD  7/10/2021 9:07 PM     ______________________________________________________________________    Admission Medication History Completed by Pharmacy     See [facility] Admission Navigator for allergy information, preferred outpatient pharmacy, prior to admission medications and immunization status.      Medication history sources:  General acute hospital medication list     Pertinent changes made to PTA medication list:  Added: N/A  Deleted: N/A  Changed: N/A     Additional medication history information:   - All meds her  medication list.            Prior to Admission medications    Medication Sig Last Dose Taking? Auth Provider   acetaminophen (TYLENOL) 650 MG CR tablet Take 650 mg by mouth every 6 hours as needed for mild pain or fever    Yes Reported, Patient   alum & mag hydroxide-simethicone (MAALOX) 200-200-20 MG/5ML SUSP suspension Take 20 mLs by mouth once as needed for indigestion   Yes Reported, Patient   ARIPiprazole ER (ABILIFY MAINTENA) 400 MG extended release inj syringe Inject 400 mg into the muscle every 28 days On the 4th of each month   Yes Reported, Patient   calcium carbonate (TUMS) 500 MG chewable tablet Take 1 chew tab by mouth 2 times daily as needed for heartburn    Yes Reported, Patient   chlorhexidine  (CHLORHEXIDINE) 0.12 % solution Swish and spit 15 mLs in mouth 2 times daily    Yes Reported, Patient   ciprofloxacin (CIPRO) 500 MG tablet Take 1 tablet (500 mg) by mouth 2 times daily for 3 days   Yes Day Hope MD   ciprofloxacin (CIPRO) 500 MG tablet Take 1 tablet (500 mg) by mouth 2 times daily for 3 days   Yes Emma Negron MD   docusate sodium (COLACE) 100 MG tablet Take 100 mg by mouth 2 times daily   Yes Reported, Patient   hydrOXYzine (ATARAX) 25 MG tablet Take 50 mg by mouth every 8 hours as needed for itching    Yes Reported, Patient   ibuprofen (ADVIL/MOTRIN) 400 MG tablet Take 400 mg by mouth 3 times daily as needed for moderate pain   Yes Reported, Patient   lithium (ESKALITH) 150 MG capsule Take 150 mg by mouth At Bedtime   Yes Reported, Patient   naltrexone (DEPADE/REVIA) 50 MG tablet Take 50 mg by mouth At Bedtime   Yes Reported, Patient   norgestimate-ethinyl estradiol (SPRINTEC 28) 0.25-35 MG-MCG tablet Take 1 tablet by mouth daily   Yes Reported, Patient   omeprazole (PRILOSEC) 40 MG DR capsule Take 40 mg by mouth daily before breakfast   Yes Reported, Patient   ondansetron (ZOFRAN-ODT) 4 MG ODT tab Take 4 mg by mouth 2 times daily as needed for nausea   Yes Unknown, Entered By History   polyethylene glycol (MIRALAX) 17 g packet Take 1 packet by mouth daily   Yes Reported, Patient   traZODone (DESYREL) 50 MG tablet Take 50 mg by mouth At Bedtime   Yes Reported, Patient   venlafaxine (EFFEXOR-XR) 150 MG 24 hr capsule Take 2 capsules (300 mg) by mouth daily   Yes Jl Connor MD   Vitamin D, Cholecalciferol, 25 MCG (1000 UT) TABS Take 1,000 Units by mouth daily    Yes Reported, Patient      Date completed: 07/10/21     Medication history completed by:   So AbdullahiD, BCPS  [facility]  Emergency Department: Ascom *[id number]

## 2021-07-11 NOTE — PROGRESS NOTES
Pt slept for 7 hours. Continues to need 1:1 for safety, remained safe this shift. No SI/SIB behaviors noted/reported, precautions still in place.

## 2021-07-12 LAB
ALBUMIN UR-MCNC: NEGATIVE MG/DL
AMORPH CRY #/AREA URNS HPF: ABNORMAL /HPF
APPEARANCE UR: CLEAR
BACTERIA #/AREA URNS HPF: ABNORMAL /HPF
BILIRUB UR QL STRIP: NEGATIVE
COLOR UR AUTO: ABNORMAL
GLUCOSE UR STRIP-MCNC: NEGATIVE MG/DL
HGB UR QL STRIP: ABNORMAL
INTERPRETATION ECG - MUSE: NORMAL
KETONES UR STRIP-MCNC: NEGATIVE MG/DL
LEUKOCYTE ESTERASE UR QL STRIP: ABNORMAL
MUCOUS THREADS #/AREA URNS LPF: PRESENT /LPF
NITRATE UR QL: NEGATIVE
PH UR STRIP: 6 [PH] (ref 5–7)
RBC URINE: 6 /HPF
SP GR UR STRIP: 1.02 (ref 1–1.03)
SQUAMOUS EPITHELIAL: 15 /HPF
UROBILINOGEN UR STRIP-MCNC: <2 MG/DL
WBC URINE: 9 /HPF

## 2021-07-12 PROCEDURE — 99222 1ST HOSP IP/OBS MODERATE 55: CPT | Performed by: PSYCHIATRY & NEUROLOGY

## 2021-07-12 PROCEDURE — 250N000009 HC RX 250: Performed by: NURSE PRACTITIONER

## 2021-07-12 PROCEDURE — 250N000013 HC RX MED GY IP 250 OP 250 PS 637

## 2021-07-12 PROCEDURE — 99221 1ST HOSP IP/OBS SF/LOW 40: CPT | Performed by: NURSE PRACTITIONER

## 2021-07-12 PROCEDURE — 87491 CHLMYD TRACH DNA AMP PROBE: CPT | Performed by: NURSE PRACTITIONER

## 2021-07-12 PROCEDURE — 250N000013 HC RX MED GY IP 250 OP 250 PS 637: Performed by: NURSE PRACTITIONER

## 2021-07-12 PROCEDURE — 124N000001 HC R&B MH

## 2021-07-12 PROCEDURE — 99207 PR CONSULT E&M CHANGED TO INITIAL LEVEL: CPT | Performed by: NURSE PRACTITIONER

## 2021-07-12 PROCEDURE — 81001 URINALYSIS AUTO W/SCOPE: CPT | Performed by: NURSE PRACTITIONER

## 2021-07-12 PROCEDURE — 99232 SBSQ HOSP IP/OBS MODERATE 35: CPT | Performed by: PSYCHIATRY & NEUROLOGY

## 2021-07-12 PROCEDURE — 128N000001 HC R&B CD/MH ADULT

## 2021-07-12 PROCEDURE — 87591 N.GONORRHOEAE DNA AMP PROB: CPT | Performed by: NURSE PRACTITIONER

## 2021-07-12 RX ORDER — CALCIUM CARBONATE 500 MG/1
500 TABLET, CHEWABLE ORAL
Status: DISCONTINUED | OUTPATIENT
Start: 2021-07-13 | End: 2021-07-15

## 2021-07-12 RX ORDER — DOCUSATE SODIUM 100 MG/1
100 CAPSULE, LIQUID FILLED ORAL 2 TIMES DAILY
Status: DISCONTINUED | OUTPATIENT
Start: 2021-07-12 | End: 2021-07-19 | Stop reason: HOSPADM

## 2021-07-12 RX ORDER — CALCIUM CARBONATE 500 MG/1
500 TABLET, CHEWABLE ORAL
Status: DISCONTINUED | OUTPATIENT
Start: 2021-07-12 | End: 2021-07-12

## 2021-07-12 RX ADMIN — HYDROXYZINE HYDROCHLORIDE 50 MG: 50 TABLET ORAL at 08:16

## 2021-07-12 RX ADMIN — NALTREXONE HYDROCHLORIDE 50 MG: 50 TABLET, FILM COATED ORAL at 20:15

## 2021-07-12 RX ADMIN — VENLAFAXINE HYDROCHLORIDE 300 MG: 150 CAPSULE, EXTENDED RELEASE ORAL at 08:16

## 2021-07-12 RX ADMIN — Medication 1000 UNITS: at 08:16

## 2021-07-12 RX ADMIN — PANTOPRAZOLE SODIUM 40 MG: 40 TABLET, DELAYED RELEASE ORAL at 16:38

## 2021-07-12 RX ADMIN — PANTOPRAZOLE SODIUM 40 MG: 40 TABLET, DELAYED RELEASE ORAL at 06:54

## 2021-07-12 RX ADMIN — LIDOCAINE HYDROCHLORIDE 30 ML: 20 SOLUTION ORAL; TOPICAL at 18:10

## 2021-07-12 RX ADMIN — CHLORHEXIDINE GLUCONATE 0.12% ORAL RINSE 15 ML: 1.2 LIQUID ORAL at 20:14

## 2021-07-12 RX ADMIN — LITHIUM CARBONATE 150 MG: 150 CAPSULE, GELATIN COATED ORAL at 20:15

## 2021-07-12 RX ADMIN — IBUPROFEN 400 MG: 200 TABLET, FILM COATED ORAL at 16:07

## 2021-07-12 RX ADMIN — HYDROXYZINE HYDROCHLORIDE 50 MG: 50 TABLET ORAL at 16:07

## 2021-07-12 RX ADMIN — TRAZODONE HYDROCHLORIDE 50 MG: 50 TABLET ORAL at 20:14

## 2021-07-12 RX ADMIN — DOCUSATE SODIUM 100 MG: 100 CAPSULE, LIQUID FILLED ORAL at 08:16

## 2021-07-12 RX ADMIN — NORGESTIMATE AND ETHINYL ESTRADIOL 1 TABLET: KIT at 08:15

## 2021-07-12 RX ADMIN — CHLORHEXIDINE GLUCONATE 0.12% ORAL RINSE 15 ML: 1.2 LIQUID ORAL at 08:15

## 2021-07-12 RX ADMIN — CIPROFLOXACIN HYDROCHLORIDE 500 MG: 500 TABLET, FILM COATED ORAL at 20:15

## 2021-07-12 RX ADMIN — DOCUSATE SODIUM 100 MG: 100 CAPSULE, LIQUID FILLED ORAL at 20:14

## 2021-07-12 RX ADMIN — CIPROFLOXACIN HYDROCHLORIDE 500 MG: 500 TABLET, FILM COATED ORAL at 08:16

## 2021-07-12 ASSESSMENT — ACTIVITIES OF DAILY LIVING (ADL)
HYGIENE/GROOMING: INDEPENDENT
DRESS: SCRUBS (BEHAVIORAL HEALTH)
HYGIENE/GROOMING: HANDWASHING;INDEPENDENT
DRESS: SCRUBS (BEHAVIORAL HEALTH);INDEPENDENT
ORAL_HYGIENE: INDEPENDENT;PROMPTS
ORAL_HYGIENE: INDEPENDENT

## 2021-07-12 NOTE — PHARMACY-ADMISSION MEDICATION HISTORY
Progress Notes by Fernanda Lamar PharmD at 7/10/2021  9:07 PM     Author: Lamar, Fernanda, PharmD Service: Pharmacy Author Type: Pharmacist    Filed: 7/10/2021  9:09 PM Date of Service: 7/10/2021  9:07 PM Status: Signed    : Fernanda Lamar PharmD (Pharmacist)       Pharmacy Note - Admission Medication History    Pertinent Provider Information:     Medication history performed by pharmacist at [facility] prior to transfer. Please see below for their note.    Patient was started on Ciprofloxacin in ED for potential UTI. Last dose received 7/10 @1600; she was prescribed 3 more days of treatment    Thank you for the opportunity to participate in the care of this patient.  Naty Hernández, [facility],BCCCP, BCCP       ______________________________________________________________________    Admission Medication History Completed by Pharmacy     See [facility] Admission Navigator for allergy information, preferred outpatient pharmacy, prior to admission medications and immunization status.      Medication history sources:  General acute hospital medication list     Pertinent changes made to PTA medication list:  Added: N/A  Deleted: N/A  Changed: N/A     Additional medication history information:   - All meds her  medication list.            Prior to Admission medications    Medication Sig Last Dose Taking? Auth Provider   acetaminophen (TYLENOL) 650 MG CR tablet Take 650 mg by mouth every 6 hours as needed for mild pain or fever    Yes Reported, Patient   alum & mag hydroxide-simethicone (MAALOX) 200-200-20 MG/5ML SUSP suspension Take 20 mLs by mouth once as needed for indigestion   Yes Reported, Patient   ARIPiprazole ER (ABILIFY MAINTENA) 400 MG extended release inj syringe Inject 400 mg into the muscle every 28 days On the 4th of each month   Yes Reported, Patient   calcium carbonate (TUMS) 500 MG chewable tablet Take 1 chew tab by mouth 2 times daily as needed for heartburn    Yes Reported, Patient   chlorhexidine  (CHLORHEXIDINE) 0.12 % solution Swish and spit 15 mLs in mouth 2 times daily    Yes Reported, Patient   ciprofloxacin (CIPRO) 500 MG tablet Take 1 tablet (500 mg) by mouth 2 times daily for 3 days   Yes Day Hope MD   ciprofloxacin (CIPRO) 500 MG tablet Take 1 tablet (500 mg) by mouth 2 times daily for 3 days   Yes Emma Negron MD   docusate sodium (COLACE) 100 MG tablet Take 100 mg by mouth 2 times daily   Yes Reported, Patient   hydrOXYzine (ATARAX) 25 MG tablet Take 50 mg by mouth every 8 hours as needed for itching    Yes Reported, Patient   ibuprofen (ADVIL/MOTRIN) 400 MG tablet Take 400 mg by mouth 3 times daily as needed for moderate pain   Yes Reported, Patient   lithium (ESKALITH) 150 MG capsule Take 150 mg by mouth At Bedtime   Yes Reported, Patient   naltrexone (DEPADE/REVIA) 50 MG tablet Take 50 mg by mouth At Bedtime   Yes Reported, Patient   norgestimate-ethinyl estradiol (SPRINTEC 28) 0.25-35 MG-MCG tablet Take 1 tablet by mouth daily   Yes Reported, Patient   omeprazole (PRILOSEC) 40 MG DR capsule Take 40 mg by mouth daily before breakfast   Yes Reported, Patient   ondansetron (ZOFRAN-ODT) 4 MG ODT tab Take 4 mg by mouth 2 times daily as needed for nausea   Yes Unknown, Entered By History   polyethylene glycol (MIRALAX) 17 g packet Take 1 packet by mouth daily   Yes Reported, Patient   traZODone (DESYREL) 50 MG tablet Take 50 mg by mouth At Bedtime   Yes Reported, Patient   venlafaxine (EFFEXOR-XR) 150 MG 24 hr capsule Take 2 capsules (300 mg) by mouth daily   Yes Jl Connor MD   Vitamin D, Cholecalciferol, 25 MCG (1000 UT) TABS Take 1,000 Units by mouth daily    Yes Reported, Patient      Date completed: 07/10/21     Medication history completed by:   So AbdullahiD, BCPS  [facility]  Emergency Department: Ascom *[phone]

## 2021-07-12 NOTE — PLAN OF CARE
"Patient spent majority of the shift isolative to her room, only coming out for meals and briefly sitting out in the milieu this morning on the lounge chair.  Patient endorsed anxiety 9 depression 8.  Patient received PRN Vistaril.  Patient given her scheduled medications for her depression.  Patient endorsed SI and admitted to biting her hand this morning, with the reason being, \" I want to leave, I want to see my girlfriend.\"   Patient could not contract for safety, stating, \" I'm afraid to sleep because I might not wake up.\"  Patient slept this afternoon with her 1:1 sitter present for safety.  Problem: Behavioral Health Plan of Care  Goal: Adheres to Safety Considerations for Self and Others  Outcome: No Change  Goal: Absence of New-Onset Illness or Injury  Outcome: No Change     Problem: Suicidal Behavior  Goal: Suicidal Behavior is Absent or Managed  Outcome: No Change  Flowsheets (Taken 7/12/2021 9864)  Mutually Determined Action Steps (Suicidal Behavior Absent/Managed): shares suicidal thoughts     Problem: Suicide Risk  Goal: Absence of Self-Harm  Outcome: No Change     "

## 2021-07-12 NOTE — PLAN OF CARE
BEHAVIORAL TEAM DISCUSSION    Participants:  Attending physician Dr Sandro Go- DARY Ash- Charge nurse  Ayala Vigil- QUOC Hernández PharmD.   Progress: Improving  Anticipated length of stay:TBD  Continued Stay Criteria/Rationale:Medication Management Symptom Stabilization Care Coordination Discharge planning  Medical/Physical: Psuedoseizures    Obesity       Syncope        Precautions:   Behavioral Orders   Procedures     Self Injury Precaution     Suicide precautions     Patients on Suicide Precautions should have a Combination Diet ordered that includes a Diet selection(s) AND a Behavioral Tray selection for Safe Tray - with utensils, or Safe Tray - NO utensils.  Cutover every 8 hours     Plan: Suicide precautions, reduce access to items that can be used for self injury  Rationale for change in precautions or plan:No changes at this time.

## 2021-07-12 NOTE — PROGRESS NOTES
07/12/21 1540   Engagement   Intervention Group   Topic Detail OT Creative Expressions group-watercolor painting for relaxation, social engagement, creativity, symptom management and healthy leisure   Attendance Did not attend   Reason for Not Attending Not available

## 2021-07-12 NOTE — PLAN OF CARE
"    Initial Psychosocial Assessment    Type of CM visit: Initial Assessment, Clinical Treatment Coordinator Role Introduction, Offer Discharge Planning    Information obtained from: [x]Patient   [x]Chart review  [x]Collateral Contacts  [x]Court Website    Hospitalization information:   Sadaf Ross is a 20 year old who was admitted to unit 2800 on 7/10/2021 due to SI    Patient Self-Assessment  Patient reported reason for admission: SI, \"I had a seizure and blacked out.\"     Patient reported symptoms of concern: [x]sadness    [x]anxiety     []anger    []poor sleep     []medications not working    []racing thoughts     []substance use     []agitation     []hearing voices     []hopelessness   []Eating concerns    []Self-injury      [] Other   Comments:    Current suicidal ideation:  [x]No    []Yes, no plan     []Yes, with plan (describe):          Comments:   Current homicidal ideation:  [x]No   [] Yes       Comments:       History of Mental Health:  Describe current and past mental health symptoms present? Anxiety, \"I am worried about my girlfriend.\" Depression \"I smoke to be with my father.\" \"My father .\" Patient reports diagnosis of RAMON, MDD and PTSD. Chart notes BPD. Self reports self injury by biting, notes that Music and crossword puzzles are coping skills.        Do you understand your mental health diagnosis? YES [x]   NO []    History of psychiatric hospitalizations?  YES [x]     NO  []  Details:  Multiple, last admission 20. [facility]   If YES, within the last 30 days? YES []     NO  [x]    History of commitment?  YES []     NO  []    Details: Self reports [place] due to medication non compliance. [place] courts no previous [place] recorded.  History of ECT?  YES []     NO  []    Details:     History of Substance Use Disorder:  Have you used alcohol or substances in the past 12 months? YES []/ NO [x]               If Yes, Type Tobacco Frequency Daily (not interested in " quitting)  Would you like a substance use disorder evaluation? YES [] / NO [x]  Previous Treatment? YES []/ NO [x]  Details:     Significant Life Events  (Illness, Death, Loss):     Reports multiple deaths within family. Reports Father's death in Feb 2021, Sister's death In 2011, Upcoming Anniversary of Grandfather's death.       Is there a history of abuse or psychological trauma:    [x]Denies       []Yes, present (type):         [x]Yes, past (type):        []Patient declined to answer    Identify current stressors:    []financial,    []legal issues,    []homelessness,    []housing,     []recent loss,    [x]relationships,    []substance use concerns,    []medical     []unemployment     []employment  concerns    []isolation,    []lack of resources,     []out of home placements,     []parenting issues     []domestic violence     []other:  Comments:       Living Situation:     []House/apt    [x]Group Home    []IRTS     []Homeless     []Assisted Living     []Nursing home    []Lives alone    []Lives with :                         []Other:          Family Composition:  Children, ages and current location if minor:   Relationship status  []Single     []     []     []       [x]Significant Other   []Other:     Educational Background:  []Less Than High School     [x]High School     []GED     []College       Cognitive/learning concerns: Patient is able to express needs. May need additional time to process information. Appears to benefit from simple statements and repetition.    Financial Status: [] Employed, status and location:  []Unemployment    []County Assistance     [x]SSI/SSDI      [x]Waivered services    []Other:    Legal status(present):   [x]Voluntary, []72-hour hold, []Commitment, []Guardianship, []Revocation, []Stay of commitment,    Details:    Other legal issues identified:  [x]None, []Arrest,  []Probation/[place],  []Driving under influence,  []Incarceration,  []Sexual offense (level):    []Child Protective Services,      []Other:      Details:    Ethnic/Cultural considerations:  None reported    Spiritual considerations: None reported     Service History:  [x]No     []Yes: details:    Social Functioning (organization, interests) and strengths: Self reports that she enjoy's spending time with her girlfriend and finds speaking to her girlfriend daily supportive. She has tried to contact her girlfriend unsuccessfully oer the weekend.States that Music and crossword puzzles are supportive on unit. Reports that she has thrown headphones in the past on unit 8120 and is unsure whether she can have headphones again    Current Treatment Providers Are:     NO Name, Agency, and phone   Psychiatrist  [x] [facility]  [phone]   Psychotherapist  [x] [facility]  [address], [place] [address] [phone]  [facility]    [facility] worker  []      [x] Jyoti Malik  [phone]   Waivered Services  []    ACT Teams  []    Day Treatment/PHP/[facility] trtmt  []    Group Home/AFC/AL  [x] Arlene,   [facility]  [address] [facility].     []    Other:  []            Social Service Assessment of identified patient needs and plan to meet those needs:     Admitted to hospital due to SI and self injurious behavior. She noted that she bites self and has not taken her mediation for the past two weeks. Patient reports that she does not have current SI/HI, has had 1 past SA. Acknowledges that she has coping skills for SIB that includes crossword puzzles and music. She has had numerous hospitalizations to stabilize and returns to her group home. Group home reports that patient can return when she is ready and has no further concerns at this time. Patient expresses a desire to leave however is willing to remain on unit to stabilize. Patient would benefit from access to her coping  skills of contacting girlfriend, music and crossword puzzles. Patient will return to previously established supports and her group home. Patient has a DBT therapist, Individual therapist, Psychiatry and lives in a customized living group home.        Possible discharge plan:   Return to Group home ([facility]) with established supports Psychiatry, therapy, DBT therapist        Barriers: Medication Management, Symptom Stabilization, Coordination of Care

## 2021-07-12 NOTE — PROGRESS NOTES
Patient was up X 3 to use the bathroom  Otherwise slept through the night. Continue to be on 1:1 for safety. No SI/SIB behavior noted. Safety checks in place

## 2021-07-12 NOTE — PLAN OF CARE
Problem: Behavioral Health Plan of Care  Goal: Adheres to Safety Considerations for Self and Others  Outcome: No Change     Problem: Suicide Risk  Goal: Absence of Self-Harm  Outcome: No Change     Patient continued on 1:1 supervision for safety. She rated depression 9/10. Denied pain and all other psych symptoms.She was out in the milieu making phone calls. Medication complaint. No suicide or self-injurious behaviors noted during this shift.

## 2021-07-12 NOTE — PLAN OF CARE
Continues to endorse SI/SIB and not naeem for safety, 1:1 sitter continues. Ibuprofen PRN given per her request for left upper chest pain 9/10 at 1600, verbalized effectiveness of medication but said her heart continues to hurt and clarified that pain is emotional. Vistaril PRN given for anxiety of 5, depression rated at 9. Independent with all ADLs, alert and oriented x3. Pleasant, cooperative and compliant with medication. Visible on unit, social and engaged in milieu.  Problem: Suicidal Behavior  Goal: Suicidal Behavior is Absent or Managed  Outcome: No Change  Note: Not naeem for safety.     Problem: Behavioral Health Plan of Care  Goal: Adheres to Safety Considerations for Self and Others  Outcome: Improving  Goal: Absence of New-Onset Illness or Injury  Outcome: Improving  Goal: Optimized Coping Skills in Response to Life Stressors  Outcome: Improving

## 2021-07-12 NOTE — PROGRESS NOTES
GROUP DOCUMENTATION    GROUP THERAPY DATE AND START TIME:   07/12/21 1300   GROUP LENGTH (MINS):   50   RELEVANT PRIMARY DIAGNOSIS: MDD   GROUP TYPE:                 [x]  Coping Skills               [x]Insight development                   []  Recovery planning      []Symptom management       []  Social/communication skills        []  Wellness strategies  []Other:     TREATMENT MODALITY:      []CBT       []DBT       [x]ACT         [x]Interpersonal psychotherapy           []Psychoeducational therapy         []Skill development         []Other:        TOPIC DETAIL:   Self exploration   ATTENDANCE:        []Stayed for entire group     []Arrived late    [] Left early       Total minutes:   0 Did not attend   PATIENT PROGRESS:     []Demonstrated understanding           []Participated       []Asked questions                               []Contributed        []Expressed goals                               []Engaged         []Argumentative                                  []Withdrawn                     []Comprehension difficulty         []Other:     COGNITION:        []Confused     []Distracted     []Alert and Orientated       []Goal Directed        []Other:     MOOD/AFFECT ASSESSMENT:        []Angry                   []Anxious                     []Blunted       []Elevated              []Congruent                 []Irritable            []Bored                  [] Happy                       []Depressed       []Withdrawn           []Content      []Other:   THOUGHT CONTENT:     []Within Normal Limits     []Delusional     []Grandiose       []Paranoid                        []Disorganized        []Negative Symptoms      []Perseverative        []Other:   # OF PATIENTS IN GROUP: 4   BILLABLE:     []Yes            [x]No   Notes:

## 2021-07-12 NOTE — H&P
"PSYCHIATRY   EVALUATION/ASSESSMENT     DATE OF SERVICE   7/11/2021           IDENTIFICATION:   Sadaf is a 20-year-old female with history of bipolar 1 disorder, borderline personality disorder, depression, intellectual disability and chronic suicidal ideations.  She was admitted from a group home for increased report of suicidal ideation and inability to contract for safety.  Of note patient was discharged from ER after an extensive work-up for abdominal pain and was discharged to group home but the same day return to the hospital with safety concerns and hence was admitted to  2800.         CHIEF COMPLAINT   \"I have a lot of pain   \"       HISTORY OF PRESENT ILLNESS     Chart reviewed and patient seen.  Discussed with staff regarding patient's status and behavior.  When writer approached patient in the room she was laying down in bed with the sheet covering her face.  She did respond to my voice and did agree to sit up and talk to writer.  Of note she is currently on a one-to-one observation and her sitter was in the room when writer approached patient.  As hospitalization progressed patient became more responsive and engaged in interview.  She endorsed all of the symptoms that have been documented and reports that she was having abdominal pain.  Reports that her.  Started yesterday and writer asked her if this may be related to her menstruation.  Patient initially agreed but then later gave a different explanation.  She denied any current nausea or vomiting.  Patient also endorsing suicidal ideation with plan to cut herself.  However states that she will be safe here in the hospital.  Reports that she misses her dad who passed away in February of this year.  States that she sees him and wants to go to him.  Hence reports that she has not been eating for the last few days.  Writer wondered if her syncopal attack was due to dehydration and poor nourishment.  As interview progressed patient became somewhat " "brighter and by the end of the interview she was able to get out of the bed walk around and accepted juice from her one-to-one staff.  Patient stated that she has a rash on her back and agreed to meet with the hospitalist for this.  Patient admitted that she has stopped taking her medications states she feels that they do not work for her.  She did state however that she did receive Abilify Maintena 400 mg this month however this will have to be verified . In reviewing her medication list patient does appear to have a  polypharmacy in her regimen.          ------------According to ER Physician report:-----------    [facility] [place] EM[facility]GENCY DEPARTMENT ([facility])  7/10/21     History           Chief Complaint   Patient presents with     Abdominal Pain       Nausea and vomiting.  Pt was seen here yesterday when sent in from clinic.  Does not feel any better.      The history is provided by the patient and medical records.      Sadaf Ross is a 20 year old female with a medical history significant for bipolar 1 disorder, borderline personality disorder, depression, intellectual disability, and suicidal ideations who presents to the emergency department for evaluation of nausea and vomiting. She denies improvement in her symptoms from her visit yesterday.  She notes a syncopal episode at her group home today in which she collapsed onto a couch.  She felt cold and \"crummy\" immediately before this episode. She is unable to recall what happened after collapsing. Patient endorses nausea, 3 episodes of vomiting, abdominal pain, chest pain, and shortness of breath. She denies incontinence.      Per DEC , patient reports multiple stressors: her father  in February, the rest of her family (mother and two sisters) are avoiding her, and her girlfriend is moving out of the group home that she is staying at.  Patient states she has been biting herself as self injury, stating that it makes her feel calmer.  " "Patient endorses chronic suicidal ideation.  She says it is worse than usual and she doesn't feel safe being discharged     Per chart review, patient was evaluated at [facility] ED on 7/9/2021 following a pseudoseizure event at her [facility] clinic visit. Patient vomited at home 7 times and was unable to recall events at clinic. She endorsed chest pain and shortness of breath. She has not been taking her medications for the past two weeks as she does not think that they work. Patient was given Zofran and IV fluids. Labs were unremarkable. Negative UPT and UA.      CT Abdomen Pelvis w Contrast 7/9/2021  IMPRESSION:   1.  No acute findings in the abdomen and pelvis.  2.  Left ovarian 3.0 cm physiologic dominant follicle/simple cyst.  Pelvic ultrasound can be considered for confirmation.  3.  Possible patchy hepatic steatosis.  A 1.0 cm indeterminate  hypodense lesion in the liver could represent focal fat or a  hemangioma. Liver MRI on an outpatient basis could be considered for  better characterization.     XR Chest 2 VW 7/9/2021  IMPRESSION: Clear lungs. No pleural effusion or pneumothorax. The  cardiac and mediastinal silhouettes are normal.     US Pelvis Complete 7/9/2021  IMPRESSION:   1. Normal pelvic ultrasound. Dominant follicle left ovary.  2. No suggestion for torsion with arterial and venous waveforms identified within each ovary.       Emma Negron MD  [facility] EMERGENCY DEPARTMENT  7/10/2021    ----------------------ER Physician Note------------------     She had been here multiple hours awaiting dec assessment I was called into the room for a possible \"seizure\".  On arrival the patient was \"shaking\" but was awake and alert and answering questions and squeezing the nurses hand and complaining of abdominal pain.  At this point, patient had an extensive work-up without signs of pathology for this abdominal pain and I did wonder about the potential of a psychosomatic cause but with " the report of a left ovarian cyst and now her report of worsening pain we did obtain a pelvic ultrasound which revealed a dominant follicle but no signs of torsion or other acute pathology.  Patient was given Toradol and Zyprexa and was feeling much improved on reevaluation.  She was seen by the behavioral health 's who reported she did contract for safety and she was requesting to go back to her group home.  She was no longer feeling suicidal and not having any obvious signs of hallucination here.  She was resting quietly on my reevaluation and confirm that she would like to go home.  I am uncertain of the exact cause of the events of the previous day but do question whether she could have potentially had a similar episode at the clinic that I witnessed here. Also question a possible viral syndrome.  Do feel that she should follow closely with her primary care provider and she was given a 3-day course of ciprofloxacin for possible UTI and to return for any new or worsening concerns.  She voiced understanding and was comfortable with this plan.  The group home was also comfortable with this plan.  She was discharged to the group home in stable condition.     Day Hope MD                CHEMICAL DEPENDENCY HISTORY     None reported               PAST PSYCHIATRIC HISTORY     Psychiatrist:  KHANH Romero  Hospitalizations: Patient has been hospitalized here at [facility] she was on 4500 in May 2021.   Medications: Please see extensive medication list that patient is on currently.  Suicide Attempts: Patient reports history of suicide attempts with most recent was an overdose attempt.         PAST MEDICAL HISTORY     H/o TBI/Seizures:     Past Medical History:   Diagnosis Date     ADHD (attention deficit hyperactivity disorder)      Bipolar 1 disorder (H)      Borderline personality disorder (H)      Depression      Depressive disorder      Intellectual disability      Obesity      Syncope         Past Surgical History:   Procedure Laterality Date     APPENDECTOMY       APPENDECTOMY           Primary Care Provider: Cathie Dowell  Medications:     chlorhexidine  15 mL Swish & Spit BID     ciprofloxacin  500 mg Oral BID     docusate sodium  100 mg Oral BID     lithium  150 mg Oral At Bedtime     naltrexone  50 mg Oral At Bedtime     norgestimate-ethinyl estradiol  1 tablet Oral Daily     pantoprazole  40 mg Oral BID AC     polyethylene glycol  17 g Oral Daily     traZODone  50 mg Oral At Bedtime     venlafaxine  300 mg Oral Daily     Vitamin D3  1,000 Units Oral Daily     Medications as needed: acetaminophen, alum & mag hydroxide-simethicone, calcium carbonate, haloperidol **AND** LORazepam **AND** diphenhydrAMINE, haloperidol lactate **AND** LORazepam **AND** diphenhydrAMINE, hydrOXYzine, ibuprofen, melatonin, polyethylene glycol, traZODone    ALLERGIES:    Allergies   Allergen Reactions     Penicillins Rash and Unknown           PRE ADMISSION MEDICATIONS   No current facility-administered medications on file prior to encounter.  acetaminophen (TYLENOL) 650 MG CR tablet, Take 650 mg by mouth every 6 hours as needed for mild pain or fever   alum & mag hydroxide-simethicone (MAALOX) 200-200-20 MG/5ML SUSP suspension, Take 20 mLs by mouth daily as needed for indigestion   ARIPiprazole ER (ABILIFY MAINTENA) 400 MG extended release inj syringe, Inject 400 mg into the muscle every 28 days On the 4th of each month  calcium carbonate (TUMS) 500 MG chewable tablet, Take 1 chew tab by mouth 2 times daily as needed for heartburn   chlorhexidine (CHLORHEXIDINE) 0.12 % solution, Swish and spit 15 mLs in mouth 2 times daily   ciprofloxacin (CIPRO) 500 MG tablet, Take 1 tablet (500 mg) by mouth 2 times daily for 3 days (Patient taking differently: Take 500 mg by mouth 2 times daily Started in ER on 7/9. Prescribed for 3 more days)  docusate sodium (COLACE) 100 MG capsule, Take 100 mg by mouth 2 times daily    hydrOXYzine (ATARAX) 25 MG tablet, Take 50 mg by mouth every 8 hours as needed for itching or anxiety   ibuprofen (ADVIL/MOTRIN) 400 MG tablet, Take 400 mg by mouth 3 times daily as needed for moderate pain  lithium (ESKALITH) 150 MG capsule, Take 150 mg by mouth At Bedtime  naltrexone (DEPADE/REVIA) 50 MG tablet, Take 50 mg by mouth At Bedtime  norgestimate-ethinyl estradiol (SPRINTEC 28) 0.25-35 MG-MCG tablet, Take 1 tablet by mouth daily  omeprazole (PRILOSEC) 40 MG DR capsule, Take 40 mg by mouth daily before breakfast  polyethylene glycol (MIRALAX) 17 g packet, Take 1 packet by mouth daily  traZODone (DESYREL) 50 MG tablet, Take 50 mg by mouth At Bedtime  venlafaxine (EFFEXOR-XR) 150 MG 24 hr capsule, Take 2 capsules (300 mg) by mouth daily  Vitamin D, Cholecalciferol, 25 MCG (1000 UT) TABS, Take 1,000 Units by mouth daily   ciprofloxacin (CIPRO) 500 MG tablet, Take 1 tablet (500 mg) by mouth 2 times daily for 3 days (Patient not taking: Reported on 7/10/2021)      acetaminophen, alum & mag hydroxide-simethicone, calcium carbonate, haloperidol **AND** LORazepam **AND** diphenhydrAMINE, haloperidol lactate **AND** LORazepam **AND** diphenhydrAMINE, hydrOXYzine, ibuprofen, melatonin, polyethylene glycol, traZODone    Medication adherence: She has been noncompliant with medications.  Medication side effects:  Benefit:        ROS   Review of Systems is otherwise negative including HEENT, CV, Respiratory, GI, , Musculoskeletal, Neurologic, Dermatologic, Endocrine, Immunological, Constitutional systems except as noted above.       FAMILY HISTORY   Family History   Problem Relation Age of Onset     Diabetes Type 1 Father      Cancer Paternal Grandfather                 SOCIAL HISTORY   Social History     Socioeconomic History     Marital status: Single     Spouse name: Not on file     Number of children: Not on file     Years of education: Not on file     Highest education level: Not on file   Occupational History      Not on file   Tobacco Use     Smoking status: Current Some Day Smoker     Years: 5.00     Types: Cigarettes     Smokeless tobacco: Never Used   Substance and Sexual Activity     Alcohol use: No     Drug use: No     Sexual activity: Yes     Partners: Female, Male     Birth control/protection: Pill   Other Topics Concern     Not on file   Social History Narrative     Not on file     Social Determinants of Health     Financial Resource Strain:      Difficulty of Paying Living Expenses:    Food Insecurity:      Worried About Running Out of Food in the Last Year:      Ran Out of Food in the Last Year:    Transportation Needs:      Lack of Transportation (Medical):      Lack of Transportation (Non-Medical):    Physical Activity:      Days of Exercise per Week:      Minutes of Exercise per Session:    Stress:      Feeling of Stress :    Social Connections:      Frequency of Communication with Friends and Family:      Frequency of Social Gatherings with Friends and Family:      Attends Taoist Services:      Active Member of Clubs or Organizations:      Attends Club or Organization Meetings:      Marital Status:    Intimate Partner Violence:      Fear of Current or Ex-Partner:      Emotionally Abused:      Physically Abused:      Sexually Abused:        Education: Education Documentation  No documentation found.  Education Comments  No comments found.       Patient is a group home resident.  Reportedly does have family including her mom and sister who reportedly do not communicate with her.  She was close to her dad who passed away in February of this year.  She is unmarried and does not have kids.  Reportedly she was in relationship with another female but her partner is now moving.       MENTAL STATUS EXAM     Mental Status  Patient appears stated age. Dressed in hospital scrubs.  Hygiene: Good  Behavior Cooperative  Eye contact Good  Speech fluent  Language is Normal  Thought Process linear  Thought Content:  "Patient is endorsing visual hallucinations of her dad.              Endorsing suicidal ideation, plan and intent.   Denied homicidal ideation,   Denied ideas of reference,    Denied loose associations,    Denied auditory hallucinations,     positive visual hallucinations   Denied delusions  Psychomotor:  agitation or slowing/None  Cognition:  Alert and oriented to time place and person  Attention fair  Concentration fair  Memory marginal including recent and remote memory  Mood: Dysthymic  Affect: Congruent  Fund of knowledge deficient  Muscle strength/Tone: Normal  Gait/Station: Normal  Insight: Limited  Judgement: Limited       PHYSICAL EXAM   Vitals: Temp 98.4  F (36.9  C) (Oral)   Resp 18   Ht 1.6 m (5' 3\")   Wt 101.2 kg (223 lb)   LMP  (LMP Unknown)   SpO2 98%   BMI 39.50 kg/m      This is an adult in no distress with normal breathing and no abnormal movements.     Physical Exam was performed in ER by  who noted the following:           LABS   Labs reviewed  No results found for: PHENYTOIN, PHENOBARB, VALPROATE, CBMZ  No results found for any visits on 07/10/21.         ASSESSMENT :     Sadaf is a 20-year-old female with history of bipolar 1 disorder, borderline personality disorder, depression, intellectual disability and chronic suicidal ideations.  She was admitted from a group home for increased report of suicidal ideation and inability to contract for safety.  Of note patient was discharged from ER after an extensive work-up for abdominal pain and was discharged to group home but the same day return to the hospital with safety concerns and hence was admitted to  280.           DIAGNOSIS     Diagnosis and Principal Problem:      Bipolar disorder unspecified  Rule out major depressive disorder recurrent with psychosis    Active Problem List:  Patient Active Problem List   Diagnosis     MENTAL HEALTH     Suicidal ideation     Suicidal ideations          TREATMENT  PLAN   1. Patient will be " admitted for milieu therapy, psychotherapy groups, professional support and medication management.  2. Patient will be admitted to station 4500  Voluntarily/on a 72 h hold for assessment and behavioral monitoring.    3. Medications: Continue PTA medications please refer to admission list and pharmacy med rec note.  4. Labs/Diagnostics: None  5. Consults: SWS.  Hospitalist to follow general medical condition and rash.  Also requested neurology consult for EEG.  6. to follow regarding collecting and reviewing collateral information, referrals, and disposition planning.     Further treatment programming to be determined throughout the hospital course.    This note was partly dictated using the Dragon software . Any  grammar or spelling errors are unintentional.      Total time spent  70 minutes with 50% in CC including d/w treatment team regarding treatment plan, Patient psychoeducation regarding diagnosis, medication options, benefits/SEincluding all Neuroleptic related SE.    Ashleigh Power MD                 CERTIFICATION     Initial Certification I certify that the inpatient psychiatric facility admission was medically necessary for treatment which could reasonably be expected to improve the patient s condition.        I estimate 4 - 7 days days of hospitalization is necessary for proper treatment of the patient. My plans for post-hospital care this patient are DischargeLocation: Prior living situation     Ashleigh Power MD     -     7/11/2021     -     7:34 PM

## 2021-07-12 NOTE — CONSULTS
NEUROLOGY CONSULTATION NOTE     Sadaf Ross,  2000, MRN [id number] Date: 2021     [facility]   Code status:  Full Code   PCP: Cathie Alves, [phone]      ASSESSMENT & PLAN   VISIT Diagnosis: Syncopal events.     Ms. Ross is a 21 yo woman with history of bipolar 1 disorder, borderline personality disorder, depression, and intellectual disability and suicidal ideation admitted to [facility] for psychiatric decompensation.  She had presented with nausea and vomiting.  In the emergency room the day prior for some chest pain and a syncopal event, felt to be pseudoseizure.  History of possible seizures in the past.  Work-up completed last year was reviewed.  Patient had stopped her home medications a couple of weeks prior to presentation.    Positive UA, negative urine culture.  Elevated WBCs on presentation. On Cipro.    EKG shows sinus arrhythmia.  Could consider additional cardiac work-up for the spells going forward.    I think her syncopal spells are unlikely due to an organic underlying seizure disorder.    Very possible that she is having pseudoseizures, given the psychiatric history.  Versus syncope due to other cause.    Would check a prolactin if she has another spell while in the hospital.    Recommend outpatient Neurology follow-up for consideration of additional EEG monitoring.    Will sign off.  No additional neurologic work-up needed during inpatient stay.       No chief complaint on file.       HISTORY OF PRESENT ILLNESS     We have been requested by Dr. Power to evaluate Sadaf Ross who is a 20 year old female for seizure.  She presented to the [facility] emergency room on the  after having what was described as a pseudoseizure during a clinic visit.  She then presented the following day for nausea and vomiting.  She reported a syncopal episode at her group home earlier in the day.  She had an odd sensation prior to the  episode.  She did endorse difficulties with memory following the event.  Patient has history of multiple psychiatric diagnoses.  She was admitted to the psychiatric service for decompensation.  Notes indicate she stopped her home medications a couple of weeks prior to presentation.  Chest x-ray negative for acute findings.    The patient has history of 2 syncopal events with shaking and decreased responsiveness which occurred in early 2020.  Brain MRI was unremarkable after that event.  EEG was also completed and normal.  She was admitted for an extensive period of time and neurology was consulted then.  Dr. Dodd felt that her episodes were not representative of seizures, in part due to a normal prolactin level as well.    Patient tells me that she was diagnosed with seizures through mental health provider.  She cannot say exactly when this was.  She does not recall the work-up that occurred last year.  No seizures in childhood.  She is unable to tell me if she was told her seizures are to psychiatric causes or electrical causes.  I do not see any additional EEG or brain imaging in [facility], since the work-up that was completed in February 2020.  She tells me she has been under a lot of stress due to the death of her father.  Patient lives in a group home.  Her girlfriend and one of the other group home residents witnessed her collapse on the ninth.  She does not remember it.  Denies incontinence or tongue biting with any of her spells.  She does think that she may be having seizures at night because she can feel some shaking.  She does not have a neurologist nor has she ever been on a medication for seizures as far she knows.    Says she is still feeling off.  She will be going home until she can stop harming herself she tells me.    Denies history of febrile seizures.  No known family history of seizures.     PAST MEDICAL & SURGICAL HISTORY     Medical History  Past Medical History:   Diagnosis Date     ADHD  (attention deficit hyperactivity disorder)      Bipolar 1 disorder (H)      Borderline personality disorder (H)      Depression      Depressive disorder      Intellectual disability      Obesity      Syncope      Surgical History  Past Surgical History:   Procedure Laterality Date     APPENDECTOMY       APPENDECTOMY          SOCIAL HISTORY     Social History      FAMILY HISTORY     Reviewed, and family history includes Cancer in her paternal grandfather; Diabetes Type 1 in her father.     ALLERGIES     Allergies   Allergen Reactions     Penicillins Rash and Unknown        REVIEW OF SYSTEMS     Pertinent items are noted in HPI.     HOME & HOSPITAL MEDICATIONS     Prior to Admission Medications  Medications Prior to Admission   Medication Sig Dispense Refill Last Dose     acetaminophen (TYLENOL) 650 MG CR tablet Take 650 mg by mouth every 6 hours as needed for mild pain or fever         alum & mag hydroxide-simethicone (MAALOX) 200-200-20 MG/5ML SUSP suspension Take 20 mLs by mouth daily as needed for indigestion         ARIPiprazole ER (ABILIFY MAINTENA) 400 MG extended release inj syringe Inject 400 mg into the muscle every 28 days On the 4th of each month   7/4/2021 at verified per  staff inj given. cfr     calcium carbonate (TUMS) 500 MG chewable tablet Take 1 chew tab by mouth 2 times daily as needed for heartburn         chlorhexidine (CHLORHEXIDINE) 0.12 % solution Swish and spit 15 mLs in mouth 2 times daily         ciprofloxacin (CIPRO) 500 MG tablet Take 1 tablet (500 mg) by mouth 2 times daily for 3 days (Patient taking differently: Take 500 mg by mouth 2 times daily Started in ER on 7/9. Prescribed for 3 more days) 6 tablet 0      docusate sodium (COLACE) 100 MG capsule Take 100 mg by mouth 2 times daily         hydrOXYzine (ATARAX) 25 MG tablet Take 50 mg by mouth every 8 hours as needed for itching or anxiety         ibuprofen (ADVIL/MOTRIN) 400 MG tablet Take 400 mg by mouth 3 times daily as needed  for moderate pain        lithium (ESKALITH) 150 MG capsule Take 150 mg by mouth At Bedtime        naltrexone (DEPADE/REVIA) 50 MG tablet Take 50 mg by mouth At Bedtime        norgestimate-ethinyl estradiol (SPRINTEC 28) 0.25-35 MG-MCG tablet Take 1 tablet by mouth daily        omeprazole (PRILOSEC) 40 MG DR capsule Take 40 mg by mouth daily before breakfast        polyethylene glycol (MIRALAX) 17 g packet Take 1 packet by mouth daily        traZODone (DESYREL) 50 MG tablet Take 50 mg by mouth At Bedtime        venlafaxine (EFFEXOR-XR) 150 MG 24 hr capsule Take 2 capsules (300 mg) by mouth daily 60 capsule 1      Vitamin D, Cholecalciferol, 25 MCG (1000 UT) TABS Take 1,000 Units by mouth daily         ciprofloxacin (CIPRO) 500 MG tablet Take 1 tablet (500 mg) by mouth 2 times daily for 3 days (Patient not taking: Reported on 7/10/2021) 6 tablet 0 Not Taking at Unknown time       Hospital Medications    chlorhexidine  15 mL Swish & Spit BID     ciprofloxacin  500 mg Oral BID     docusate sodium  100 mg Oral BID     lithium  150 mg Oral At Bedtime     naltrexone  50 mg Oral At Bedtime     norgestimate-ethinyl estradiol  1 tablet Oral Daily     pantoprazole  40 mg Oral BID AC     polyethylene glycol  17 g Oral Daily     traZODone  50 mg Oral At Bedtime     venlafaxine  300 mg Oral Daily     Vitamin D3  1,000 Units Oral Daily        PHYSICAL EXAM     Vital signs  Temp:  [98.4  F (36.9  C)] 98.4  F (36.9  C)  Pulse:  [61] 61  Resp:  [18] 18  BP: (116)/(77) 116/77  SpO2:  [98 %-100 %] 100 %    Weight:   [unfilled]    General Physical Exam: Patient is alert and oriented x2. Vital signs were reviewed and are documented in EMR. Neck was supple. no carotid bruit, thyromegaly, JVD or lymphadenopathy noted.  Neurological Exam:  Mental status: Alert, attentive.  Blunted affect but appropriate social responses.  Speech: Slightly dysarthric (not new), childlike at times.  Cranial nerves: 2-12 intact  Motor: 5/5 in the proximal and  distal muscles of the upper and lower extremities.  Reflexes: Intact and symmetric.  Sensory: Intact light touch throughout.  Coordination: Finger-nose-finger is normal bilaterally.  Gait: Casual gait is normal.     DIAGNOSTIC STUDIES     Pertinent Radiology   Radiology Results: Personally reviewed image/s      MRI  Brain (2.5.20):  FINDINGS: Mildly limited by motion despite repeat attempts. This is most pronounced on the postcontrast images.  INTRACRANIAL CONTENTS: No acute or subacute infarct. No mass, acute hemorrhage, or extra-axial fluid collections. Normal brain parenchymal signal. Normal ventricles and sulci. Normal position of the cerebellar tonsils. No pathologic contrast   enhancement.     SELLA: No abnormality accounting for technique.     OSSEOUS STRUCTURES/SOFT TISSUES: Normal marrow signal. The major intracranial vascular flow voids are maintained.      ORBITS: No abnormality accounting for technique.      SINUSES/MASTOIDS: Mild scattered paranasal sinus mucosal disease. Left maxillary sinus mucous retention cyst. No middle ear or mastoid effusion.      IMPRESSION:  1.  Mildly limited by motion. No definite acute intracranial abnormality    Electroencephalogram (2.6.20):  Description the Recording: This is a multichannel digital EEG recording using the international 10-20 placement system.     Posterior dominant rhythm consists of a 10-11 Hz alpha rhythm that is moderate in amplitude.  The posterior dominant rhythm blocks with eye opening and resumes with eye closure.  Photic stimulation is done and a well-developed driving response is seen.    Hyperventilation is performed for a 3-minute period of time without significant change in background activity noted.  Patient does become drowsy during the recording and low amplitude theta activity is noted.  EKG demonstrates a regular rhythm.     During this recording no sharp discharges, spikes or electrographic seizure activity was recorded.     Impression:  This is a normal awake and drowsy EEG.    Pertinent Labs   Lab Results: personally reviewed.   No results found for this or any previous visit (from the past 24 hour(s)).    Total time spent for face to face visit, reviewing labs/imaging studies, counseling and coordination of care was: 1 Hour More than 50% of this time was spent on counseling and coordination of care.    Morena Easton MD  [facility]   [address]  [place]  [address]

## 2021-07-12 NOTE — CONSULTS
"[facility]  Consult Note - Hospitalist Service     Date of Admission:  7/10/2021  Consult Requested by: Luzma Norris MD  Reason for Consult: Abdominal Pain. Rash on back.     Assessment & Plan   Sadaf Ross is a 20 year old female admitted inpatient psychiatry on 7/10/2021 for decompensated mental illness. Hospital Medicine Service consulted by psychiatry for abdominal pain and rash on back. At beginning of interview, RN reported patient complaining left upper chest pain.     # Left Upper Chest Pain:  - Recent cardiac work up in ED negative troponin. EKG stable. 07/09 Chest x-ray negative for acute airspace disease.   - 07/10 Negative COVID screen  - ? Musculoskeletal, GERD, psychiatric illness   - Trial GI cocktail    # Chronic Abdominal Pain:  - CT 07/09 negative for acute intra-abdominal findings.  - Pelvic ultrasound 07/09 negative for acute pelvic findings.   - Trial GI cocktail  - Protonix  - TUMS before meals  - STD screening  - Repeat UA/UC    # Seizure-like activity: Neurology already consulted by primary team.      # Suspected UTI: PTA Ciprofloxacin    # Morbid Obesity: Body mass index is 39.5 kg/m . encourage healthy meal choices. Physical activity per patient tolerance.     # Suicidal Ideation:  # Bipolar Disorder:  - Management per primary team.    Will examine reported rash on back tomorrow.         The patient's care was discussed with the Bedside Nurse.                LINN Muñoz CNP   Hospital Medicine Service  [facility]    ______________________________________________________________________    Chief Complaint   \" I have chest pain. \"    History is obtained from the patient and electronic chart review.     History of Present Illness   20 year old female with past medical history of depression, borderline personality disorder, intellectual disability, obesity, ADHD admitted inpatient psychiatry unit for decompensated " mental illness.     Hospital Medicine Service consulted by psychiatry for abdominal pain and rash on back. At beginning of interview, RN reported patient complaining left upper chest pain.     Left upper chest pain started minutes prior to my arrival. Endorses heartburn, per chart review, heart burn chronic and takes as needs TUMS and Maalox at group home.  Per electronic chart review, 07/09/21 evaluated in the emergency department after syncopal episode. Work up unremarkable.     Patient reports generalized abdominal pain. At time of interview, chest pain her main concern.     Per Morning Charge RN RN report, ate 100% breakfast and lunch. On Ciprofloxacin for suspected urinary tract infection.   Sexually active, male and female partners.             Electronic chart review: ED work up 07/09/21 07/09/21 Troponin 0.00    Interpreted by Emma Negron MD  Time reviewed: 15:55  Symptoms at time of EKG: chest pain   Rhythm: sinus bradycardia  Rate: 53  Axis: normal  Ectopy: none  Conduction: normal  ST Segments/ T Waves: No ST-T wave changes prior to previous EKG  Q Waves: none  Comparison to prior: Unchanged from December 19, 2019 except that the heart rate has decreased today.     Clinical Impression: Sinus bradycardia, otherwise unremarkable EKG    Imaging  Pelvic Ultrasound 07/09/21                                                     IMPRESSION:    1.  Normal pelvic ultrasound. Dominant follicle left ovary.  2.  No suggestion for torsion with arterial and venous waveforms identified within each ovary.        XR CHEST 2 VW 7/9/2021 9:27 PM                                                               IMPRESSION: Clear lungs. No pleural effusion or pneumothorax. The  cardiac and mediastinal silhouettes are normal.    CT ABDOMEN PELVIS W CONTRAST 7/9/2021 7:50 PM                                                                   IMPRESSION:   1.  No acute findings in the abdomen and pelvis.  2.  Left  ovarian 3.0 cm physiologic dominant follicle/simple cyst.  Pelvic ultrasound can be considered for confirmation.  3.  Possible patchy hepatic steatosis.  A 1.0 cm indeterminate  hypodense lesion in the liver could represent focal fat or a  hemangioma. Liver MRI on an outpatient basis could be considered for  better characterization.            Past Medical History    I have reviewed this patient's medical history and updated it with pertinent information if needed.   Past Medical History:   Diagnosis Date     ADHD (attention deficit hyperactivity disorder)      Bipolar 1 disorder (H)      Borderline personality disorder (H)      Depression      Depressive disorder      Intellectual disability      Obesity      Syncope        Past Surgical History   I have reviewed this patient's surgical history and updated it with pertinent information if needed.  Past Surgical History:   Procedure Laterality Date     APPENDECTOMY       APPENDECTOMY         Social History   I have reviewed this patient's social history and updated it with pertinent information if needed.  Social History     Tobacco Use     Smoking status: Current Some Day Smoker     Years: 5.00     Types: Cigarettes     Smokeless tobacco: Never Used   Substance Use Topics     Alcohol use: No     Drug use: No       Family History   I have reviewed this patient's family history and updated it with pertinent information if needed.  Family History   Problem Relation Age of Onset     Diabetes Type 1 Father      Cancer Paternal Grandfather            Allergies   Allergies   Allergen Reactions     Penicillins Rash and Unknown       Physical Exam   Vital Signs:     BP: 116/77 Pulse: 61   Resp: 18 SpO2: 100 % O2 Device: None (Room air)    Weight: 223 lbs 0 oz      Physical Exam  Vitals: reviewed by me  General: Alert, pleasant, cooperative,and alert to conversation. 1:1 attendant present  Eyes: Tracking well, clear conjunctiva BL  ENT: neck supple; midline trachea.   Lungs:  No tachypnea, no accessory muscle use. No respiratory distress.   CV: Rate as above, regular rhythm.  Abd: Soft, non tender, no guarding, no rebound. Non distended. Bowel sounds active x 4 quads.   MSK: no peripheral edema or joint effusion. No evidence of trauma  : Female chaperone present  Limited pelvic exam for wet prep vaginal swab. No speculum or bimanual exam.   Skin: No rash, normal turgor and temperature  Neuro: Delayed clear speech and no facial droop.

## 2021-07-12 NOTE — PROGRESS NOTES
07/12/21 1306   Engagement   Intervention Group   Topic Detail OT Wellness group-Exercise dice for social engagement, following directions, physical movement, symptom managment and healthy distraction   Attendance Attended   Patient Response Needs reinforcement/repetition to learn materials;Demonstrated understanding of materials provided;Was respectful   Concentrated on Task duration of group   Cognition Goal-directed   Mood/Affect Content   Social/Behavioral Cooperative;Guarded;Redirectable   Pt actively engaged in group exercise dice activity. Pt endorsed positive response to stretches and physical movement. Pt assisted therapist with matching dice rolls to physical movements and relaying those to peers.

## 2021-07-12 NOTE — PROGRESS NOTES
Neurology consult called in to [facility] [place]. On call Provider-Dr Easton.     Hospitalist consult paged.

## 2021-07-12 NOTE — PROGRESS NOTES
"PSYCHIATRY   PROGRESS NOTE     DATE OF SERVICE   2021       CHIEF COMPLAINT   \" Patient was admitted due to suicidal thoughts and self-injurious behaviors\"  H&P and progress notes have been reviewed.     SUBJECTIVE/OBJECTIVE   Nursing report:Patient spent majority of the shift isolative to her room, only coming out for meals and briefly sitting out in the milieu this morning on the lounge chair.  Patient endorsed anxiety 9 depression 8.  Patient received PRN Vistaril.  Patient given her scheduled medications for her depression.  Patient endorsed SI and admitted to biting her hand this morning, with the reason being, \" I want to leave, I want to see my girlfriend.\"   Patient could not contract for safety, stating, \" I'm afraid to sleep because I might not wake up.\"  Patient slept this afternoon with her 1:1 sitter present for safety.      Patient has been reviewed with the  earlier today during team meeting. Patient will return to  group home once she is more stable.          HISTORY OF PRESENT ILLNESS   Patient was seen and evaluated in the day area by her self, this was a face to face evaluation. Patient is a 21 y/o  woman, domiciled at a group home, unemployed and supported by social security disability.     Patient reported that she came to the ER originally because she was feeling dizzy and fell, late on she had a seizure. Because of this she was feeling suicidal with the plan of biting her self. Patient said that she has been living in the group home since 2020 and she likes the place. Patient said that her father  on 2021 and since then she has been feeling depressed.     Patient tells me that she is compliant with her medications, but she doesn't remember the names. She doesn't have a guardian but she has a CM named Jyoti from [facility]. Patient is not able to contract for safety and said that she is 50% sure she is not going to harm her self. Patient tells me that she is " still having the urge to bite her self as she wants to die to be with her dad. At the same time she wants to discharge to her group home as soon as possible.     During the assessment patient seemed manipulative, concrete, dismissive and ng for attention. She denied any other psychiatric symptoms and has not engaged in any self injurious behaviors. Patient has been redirectable with her one to one.     CHEMICAL DEPENDENCY HISTORY   Social History     Substance and Sexual Activity   Drug Use Not Currently       Social History     Substance and Sexual Activity   Alcohol Use Not Currently       Social History     Tobacco Use   Smoking Status Current Every Day Smoker     Packs/day: none   Smokeless Tobacco Former User     Denies the use of any drugs       PAST PSYCHIATRIC HISTORY   According to Dr. Soares's notes:    Patient reports that she has had problems with depression since age 11. As a teen she had multiple hospitalizations for suicidal thoughts and self harm behavior, especially cutting. She was treated with medications including Effexor, Naltrexone, Lamictal, Melatonin. She carries the diagnosis of Bipolar disorder. She denies periods of elevated mood but will have periods of more racing thoughts and sleeplessness with increased agitation and increased self harm. She was hospitalized on 5500 in February 2020. She was also admitted at [facility] in June of 2020. Record indicates that she also has a history of developmental delay and Borderline personality disorder.          PAST MEDICAL HISTORY   Past Medical History:   Diagnosis Date     Borderline personality disorder (H)      Depression      Intellectual disability      Obesity      Syncope        Past Surgical History:   Procedure Laterality Date     APPENDECTOMY         Primary Care Provider: Provider, No Primary Care    ALLERGIES: Penicillins       MEDICATIONS     Current Facility-Administered Medications:      acetaminophen (TYLENOL) tablet 650 mg, 650 mg,  Oral, Q4H PRN, Rashid Sanchez MD     alum & mag hydroxide-simethicone (MAALOX) suspension 30 mL, 30 mL, Oral, Q4H PRN, Rashid Sanchez MD     [START ON 7/13/2021] calcium carbonate (TUMS) chewable tablet 500 mg, 500 mg, Oral, TID AC, Tavo Horn APRN CNP     chlorhexidine (PERIDEX) 0.12 % solution 15 mL, 15 mL, Swish & Spit, BID, Rashid Sanchez MD, 15 mL at 07/12/21 0815     ciprofloxacin (CIPRO) tablet 500 mg, 500 mg, Oral, BID, Rashid Sanchez MD, 500 mg at 07/12/21 0816     haloperidol (HALDOL) tablet 5 mg, 5 mg, Oral, Q8H PRN **AND** LORazepam (ATIVAN) tablet 2 mg, 2 mg, Oral, Q8H PRN **AND** diphenhydrAMINE (BENADRYL) capsule 50 mg, 50 mg, Oral, Q8H PRN, Rashid Sanchez MD     haloperidol lactate (HALDOL) injection 5 mg, 5 mg, Intramuscular, Q8H PRN **AND** LORazepam (ATIVAN) injection 2 mg, 2 mg, Intramuscular, Q8H PRN **AND** diphenhydrAMINE (BENADRYL) injection 50 mg, 50 mg, Intramuscular, Q8H PRN, Rashid Sanchez MD     docusate sodium (COLACE) capsule 100 mg, 100 mg, Oral, BID, Tavo Horn APRN CNP     docusate sodium (COLACE) capsule 100 mg, 100 mg, Oral, BID, Rashid Sanchez MD, 100 mg at 07/12/21 0816     hydrOXYzine (ATARAX) tablet 50 mg, 50 mg, Oral, Q4H PRN, Rashid Sanchez MD, 50 mg at 07/12/21 1607     ibuprofen (ADVIL/MOTRIN) tablet 400 mg, 400 mg, Oral, TID PRN, Rashid Sanchez MD, 400 mg at 07/12/21 1607     lithium (ESKALITH) capsule 150 mg, 150 mg, Oral, At Bedtime, Rashid Sanchez MD, 150 mg at 07/11/21 2030     melatonin tablet 3 mg, 3 mg, Oral, At Bedtime PRN, Rashid Sanchez MD     naltrexone (DEPADE/REVIA) tablet 50 mg, 50 mg, Oral, At Bedtime, Rashid Sanchez MD, 50 mg at 07/11/21 2029     norgestimate-ethinyl estradiol (ORTHO-CYCLEN) 0.25-35 MG-MCG per tablet 1 tablet, 1 tablet, Oral, Daily, Rashid Sanchez MD, 1 tablet at 07/12/21 0815     pantoprazole (PROTONIX) EC tablet 40 mg, 40 mg, Oral,  BID AC, Rashid Sanchez MD, 40 mg at 07/12/21 1638     polyethylene glycol (MIRALAX) Packet 17 g, 17 g, Oral, Daily, Rashid Sanchez MD, 17 g at 07/11/21 0918     polyethylene glycol (MIRALAX) Packet 17 g, 17 g, Oral, Daily PRN, Rashid Sanchez MD     traZODone (DESYREL) tablet 50 mg, 50 mg, Oral, At Bedtime, Rashid Sanchez MD, 50 mg at 07/11/21 2029     traZODone (DESYREL) tablet 50 mg, 50 mg, Oral, At Bedtime PRN, Rashid Sanchez MD     venlafaxine (EFFEXOR-XR) 24 hr capsule 300 mg, 300 mg, Oral, Daily, Rashid Sanchez MD, 300 mg at 07/12/21 0816     Vitamin D3 (CHOLECALCIFEROL) tablet 1,000 Units, 1,000 Units, Oral, Daily, Rashid Sanchez MD, 1,000 Units at 07/12/21 0816  acetaminophen, aluminum-magnesium hydroxide-simethicone, calcium (as carbonate), chlorhexidine, haloperidoL **AND** LORazepam **AND** diphenhydrAMINE, haloperidol lactate **AND** LORazepam **AND** diphenhydrAMINE, docusate sodium, hydrOXYzine pamoate, ibuprofen, melatonin, omeprazole, ondansetron, polyethylene glycol, traZODone, traZODone    Medication adherence: patient was refusing medications past few days  Medication side effects: none  Benefit: good       ROS     Review of Systems is otherwise negative including HEENT, CV, Respiratory, GI, , Musculoskeletal, Neurologic, Dermatologic, Endocrine, Immunological, Constitutional systems         FAMILY HISTORY   Family History   Problem Relation Age of Onset     Diabetes type I Father      Cancer Paternal Grandfather         Psychiatric: father had mood problems  Chemical: father had alcohol problems  Suicide: none       SOCIAL HISTORY   Social History     Socioeconomic History     Marital status: Single     Spouse name: Not on file     Number of children: Not on file     Years of education: Not on file     Highest education level: Not on file   Occupational History     Not on file   Social Needs     Financial resource strain: Not on file     Food insecurity      Worry: Not on file     Inability: Not on file     Transportation needs     Medical: Not on file     Non-medical: Not on file   Tobacco Use     Smoking status: Current Every Day Smoker     Packs/day: 1.50     Smokeless tobacco: Former User   Substance and Sexual Activity     Alcohol use: Not Currently     Drug use: Not Currently     Sexual activity: Not Currently   Lifestyle     Physical activity     Days per week: Not on file     Minutes per session: Not on file     Stress: Not on file   Relationships     Social connections     Talks on phone: Not on file     Gets together: Not on file     Attends Holiness service: Not on file     Active member of club or organization: Not on file     Attends meetings of clubs or organizations: Not on file     Relationship status: Not on file     Intimate partner violence     Fear of current or ex partner: Not on file     Emotionally abused: Not on file     Physically abused: Not on file     Forced sexual activity: Not on file   Other Topics Concern     Not on file   Social History Narrative    02/15/17 - Patient resides with her mother, father, and sister.        Education: HS grad attended special ed  Occupation: unemployed  Relationship Status:  single  Family and Living Situation: lives in residential facility. Sees mother regularly  Children: none             MENTAL STATUS EXAM     Mental Status  Patient is using scrubs  Hygiene good  Speech fluent  Thought Process concrete  Thought Content:  + suicidal ideation,    In termittent homicidal ideation,   No ideas of reference,    No loose associations,    No auditory hallucinations,     No visual hallucinations   No delusions  Psychomotor: some agitation today  Cognition:  Alert and oriented to time place and person  Attention good  Concentration fair  Memory normal including recent and remote memory  Mood:  Depressed   Affect: dismissive and irritable   Judgement: limited  Eye contact good  Cooperation good  Language  "normal  Fund of knowledge normal  Musculoskeletal normal gait with no abnormal movements  Minimal change in mental status in the past 24 hours       PHYSICAL EXAM   /69   Pulse 73   Temp 98.6  F (37  C) (Oral)   Resp 16   Ht 1.6 m (5' 3\")   Wt 101.2 kg (223 lb)   LMP  (LMP Unknown)   SpO2 100%   BMI 39.50 kg/m      This is an adult in no distress with normal breathing and no abnormal movements.     Physical Exam was performed in ER. Please see record.           LABS   Admission on 07/10/2021, Discharged on 07/10/2021   Component Date Value Ref Range Status     Interpretation ECG 07/10/2021 Click View Image link to view waveform and result   Final     Hemoglobin 07/10/2021 12.6  11.7 - 15.7 g/dL Final     Sodium 07/10/2021 138  133 - 144 mmol/L Final     Potassium 07/10/2021 3.9  3.4 - 5.3 mmol/L Final     Chloride 07/10/2021 110* 94 - 109 mmol/L Final     Carbon Dioxide 07/10/2021 25  20 - 32 mmol/L Final     Anion Gap 07/10/2021 3  3 - 14 mmol/L Final     Glucose 07/10/2021 82  70 - 99 mg/dL Final     Urea Nitrogen 07/10/2021 8  7 - 30 mg/dL Final     Creatinine 07/10/2021 0.77  0.52 - 1.04 mg/dL Final     GFR Estimate 07/10/2021 >90  >60 mL/min/[1.73_m2] Final    Comment: Non  GFR Calc  Starting 12/18/2018, serum creatinine based estimated GFR (eGFR) will be   calculated using the Chronic Kidney Disease Epidemiology Collaboration   (CKD-EPI) equation.       GFR Estimate If Black 07/10/2021 >90  >60 mL/min/[1.73_m2] Final    Comment:  GFR Calc  Starting 12/18/2018, serum creatinine based estimated GFR (eGFR) will be   calculated using the Chronic Kidney Disease Epidemiology Collaboration   (CKD-EPI) equation.       Calcium 07/10/2021 8.9  8.5 - 10.1 mg/dL Final     Glucose 07/10/2021 93  70 - 99 mg/dL Final     Troponin I 07/10/2021 0.00  0.00 - 0.08 ug/L Final     SARS-CoV-2 Virus Specimen Source 07/10/2021 Nasopharyngeal   Final     SARS-CoV-2 PCR Result 07/10/2021 " NEGATIVE   Final    SARS-CoV2 (COVID-19) RNA not detected, presumed negative.     SARS-CoV-2 PCR Comment 07/10/2021 (Note)   Final    Comment: Testing was performed using the nestor SARS-CoV-2 & Influenza A/B Assay on the   nestor Tamar System.  This test should be ordered for the detection of SARS-COV-2 in individuals who   meet SARS-CoV-2 clinical and/or epidemiological criteria. Test performance is   unknown in asymptomatic patients.  This test is for in vitro diagnostic use under the FDA EUA for laboratories   certified under CLIA to perform moderate and/or high complexity testing. This   test has not been FDA cleared or approved.  A negative test does not rule out the presence of PCR inhibitors in the   specimen or target RNA in concentration below the limit of detection for the   assay. The possibility of a false negative should be considered if the   patient's recent exposure or clinical presentation suggests COVID-19.  [facility] are certified under the Clinical Laboratory   Improvement Amendments of 1988 (CLIA-88) as qualified to perform moderate   and/or high complexity laboratory testing.              DIAGNOSIS     Diagnosis and Principal Problem:    Adjustment disorder with mixed disturbance of emotions and conduct    Active Problem List:  Patient Active Problem List   Diagnosis     MENTAL HEALTH     Suicidal ideation     Suicidal ideations     Intellectual disability     Bipolar affective disorder, currently depressed, moderate (H)     Borderline personality disorder (H)            PLAN   1. Education given regarding diagnostic and treatment options with risks, benefits and alternatives and adequate verbalization of understanding.  2. Admitted voluntary. Will try to coordinate discharge soon  3. Medications:      Lithium 150mg at bedtime       Naltrexone 50mg at bedtime       Trazodone 50mg at bedtime       Effexor 300mg daily   4. Hospitalist following the patient. Neurology consult  also has been placed.   5.  to follow regarding collecting and reviewing collateral information, referrals, and disposition planning        Further treatment programming to be determined throughout the hospital course.    Care discussed with Care/Treatment Team Members, Chart reviewed and Patient seen    Patient seen, chart reviewed, case reviewed with  and with nursing.  Case reviewed in multi-disciplinary treatment team.    Luzma Norris MD                  CERTIFICATION       Re-Certification I certify that the inpatient psychiatric facility services furnished since the previous certification were, and continue to be, medically necessary for, either, treatment which could reasonably be expected to improve the patient s condition or diagnostic study and that the hospital records indicate that the services furnished were, either, intensive treatment services, admission and related services necessary for diagnostic study, or equivalent services.     I certify that the patient continues to need, on a daily basis, active treatment furnished directly by or requiring the supervision of inpatient psychiatric facility personnel.   I estimate 7 days of hospitalization is necessary for proper treatment of the patient. My plans for post-hospital care for this patient are group home     Luzma Norris MD      -     July 12, 2021

## 2021-07-13 ENCOUNTER — COMMUNICATION - HEALTHEAST (OUTPATIENT)
Dept: SCHEDULING | Facility: CLINIC | Age: 21
End: 2021-07-13

## 2021-07-13 PROCEDURE — 128N000001 HC R&B CD/MH ADULT

## 2021-07-13 PROCEDURE — 250N000013 HC RX MED GY IP 250 OP 250 PS 637

## 2021-07-13 PROCEDURE — 99231 SBSQ HOSP IP/OBS SF/LOW 25: CPT | Performed by: NURSE PRACTITIONER

## 2021-07-13 PROCEDURE — 99231 SBSQ HOSP IP/OBS SF/LOW 25: CPT | Performed by: PSYCHIATRY & NEUROLOGY

## 2021-07-13 PROCEDURE — 250N000013 HC RX MED GY IP 250 OP 250 PS 637: Performed by: NURSE PRACTITIONER

## 2021-07-13 PROCEDURE — 124N000001 HC R&B MH

## 2021-07-13 RX ORDER — DIAPER,BRIEF,INFANT-TODD,DISP
EACH MISCELLANEOUS 2 TIMES DAILY PRN
Status: DISCONTINUED | OUTPATIENT
Start: 2021-07-13 | End: 2021-07-19 | Stop reason: HOSPADM

## 2021-07-13 RX ADMIN — DOCUSATE SODIUM 100 MG: 100 CAPSULE, LIQUID FILLED ORAL at 11:40

## 2021-07-13 RX ADMIN — CALCIUM CARBONATE (ANTACID) CHEW TAB 500 MG 500 MG: 500 CHEW TAB at 11:39

## 2021-07-13 RX ADMIN — POLYETHYLENE GLYCOL 3350 17 G: 17 POWDER, FOR SOLUTION ORAL at 11:39

## 2021-07-13 RX ADMIN — CALCIUM CARBONATE (ANTACID) CHEW TAB 500 MG 500 MG: 500 CHEW TAB at 16:09

## 2021-07-13 RX ADMIN — CHLORHEXIDINE GLUCONATE 0.12% ORAL RINSE 15 ML: 1.2 LIQUID ORAL at 20:16

## 2021-07-13 RX ADMIN — CALCIUM CARBONATE (ANTACID) CHEW TAB 500 MG 500 MG: 500 CHEW TAB at 07:23

## 2021-07-13 RX ADMIN — LITHIUM CARBONATE 150 MG: 150 CAPSULE, GELATIN COATED ORAL at 20:16

## 2021-07-13 RX ADMIN — NORGESTIMATE AND ETHINYL ESTRADIOL 1 TABLET: KIT at 11:40

## 2021-07-13 RX ADMIN — PANTOPRAZOLE SODIUM 40 MG: 40 TABLET, DELAYED RELEASE ORAL at 16:09

## 2021-07-13 RX ADMIN — DOCUSATE SODIUM 100 MG: 100 CAPSULE, LIQUID FILLED ORAL at 20:16

## 2021-07-13 RX ADMIN — CIPROFLOXACIN HYDROCHLORIDE 500 MG: 500 TABLET, FILM COATED ORAL at 11:40

## 2021-07-13 RX ADMIN — Medication 1000 UNITS: at 11:40

## 2021-07-13 RX ADMIN — VENLAFAXINE HYDROCHLORIDE 300 MG: 150 CAPSULE, EXTENDED RELEASE ORAL at 11:40

## 2021-07-13 RX ADMIN — TRAZODONE HYDROCHLORIDE 50 MG: 50 TABLET ORAL at 20:16

## 2021-07-13 RX ADMIN — PANTOPRAZOLE SODIUM 40 MG: 40 TABLET, DELAYED RELEASE ORAL at 07:23

## 2021-07-13 RX ADMIN — NALTREXONE HYDROCHLORIDE 50 MG: 50 TABLET, FILM COATED ORAL at 20:16

## 2021-07-13 ASSESSMENT — ACTIVITIES OF DAILY LIVING (ADL)
ORAL_HYGIENE: INDEPENDENT
HYGIENE/GROOMING: INDEPENDENT
ORAL_HYGIENE: INDEPENDENT
DRESS: SCRUBS (BEHAVIORAL HEALTH)
HYGIENE/GROOMING: HANDWASHING;INDEPENDENT
DRESS: SCRUBS (BEHAVIORAL HEALTH);INDEPENDENT

## 2021-07-13 NOTE — PROGRESS NOTES
[facility]    Medicine Progress Note - Hospitalist Service       Date of Admission:  7/10/2021    Assessment & Plan            Sadaf Ross is a 20 year old female admitted inpatient psychiatry on 7/10/2021 for decompensated mental illness. Hospital Medicine Service consulted by psychiatry for abdominal pain and rash on back. At beginning of interview, RN reported patient complaining left upper chest pain.      # Left Upper Chest Pain: Much Improved  - Recent cardiac work up in ED negative troponin. EKG stable. 07/09 Chest x-ray negative for acute airspace disease.   - 07/10 Negative COVID screen  - ? Musculoskeletal, GERD, psychiatric illness      # Chronic Abdominal Pain:  - CT 07/09 negative for acute intra-abdominal findings.  - Pelvic ultrasound 07/09 negative for acute pelvic findings.   - 07/12 GI cocktail x 1  - Protonix daily  - TUMS before meals  - STD screening labs pending  - UA/UC appears contaminated. Negative for nitrites. Complete PTA Ciprofloxacin course.   - For complaints dysuria or worsening abdominal pain, can recheck UA via good clean catch/straight cath     # Seizure-like activity: Neurology already consulted by primary team.     # Rash, Mid-Upper back: Mild. resembles heat rash.  PRN Hydrocortisone topical ointment     # Morbid Obesity: Body mass index is 39.5 kg/m . encourage healthy meal choices. Physical activity per patient tolerance.      # Suicidal Ideation:  # Bipolar Disorder:  - Management per primary team.        Will follow STD results peripherally. Contact hospital medicine service for questions or concerns.       Diet: Combination Diet Regular Diet Adult; Safe Tray - with utensils    DVT Prophylaxis: Low Risk/Ambulatory with no VTE prophylaxis indicated      Disposition Plan   Per Psychiatry    LINN Muñoz CNP  Hospitalist Service  [facility]  "Hospital        ______________________________________________________________________    Interval History   Chest pain \" almost gone \"  Per RN notes, patient mentioned chest pain emotionally related      Physical Exam   Vital Signs: Temp: 98.1  F (36.7  C) Temp src: Oral BP: 114/69 Pulse: 57   Resp: 17 SpO2: 99 %      Weight: 223 lbs 0 oz      PHYSICAL EXAMINATION FINDINGS: Found sleeping in bed, easily wakes to voice. Sat up for examination. Non labored breathing, moving extremities freely  Diffuse mild rash resembling heat rash mid and upper back          "

## 2021-07-13 NOTE — PROGRESS NOTES
"PSYCHIATRY   PROGRESS NOTE     DATE OF SERVICE   July 12, 2021       CHIEF COMPLAINT   \" Patient was admitted due to suicidal thoughts and self-injurious behaviors\"  H&P and progress notes have been reviewed.     SUBJECTIVE/OBJECTIVE   Nursing report: Continues to endorse SI/SIB and not naeem for safety, 1:1 sitter continues. Ibuprofen PRN given per her request for left upper chest pain 9/10 at 1600, verbalized effectiveness of medication but said her heart continues to hurt and clarified that pain is emotional. Vistaril PRN given for anxiety of 5, depression rated at 9. Independent with all ADLs, alert and oriented x3. Pleasant, cooperative and compliant with medication. Visible on unit, social and engaged in milieu.      Patient has been reviewed with the  earlier today during team meeting. Patient will return to  group home once she is more stable.          HISTORY OF PRESENT ILLNESS   Patient was seen and evaluated at bedside with one-to-one present during the assessment, this was a face-to-face evaluation.  Patient tells me that earlier today she was thinking about biting herself because she wants to be with her dad.  At the moment she said that she did not felt like harming herself.  I discussed with the patient the possibility of taking her off the one-to-one and today she was in agreement with this.  Patient denied any other psychiatric symptoms and tells me that she is looking forward to be able to discharge back again to her group home.    During the assessment the patient was very concrete, providing major answers, staring at this writer and suddenly ending the assessment.     CHEMICAL DEPENDENCY HISTORY   Social History     Substance and Sexual Activity   Drug Use Not Currently       Social History     Substance and Sexual Activity   Alcohol Use Not Currently       Social History     Tobacco Use   Smoking Status Current Every Day Smoker     Packs/day: none   Smokeless Tobacco Former " User     Denies the use of any drugs       PAST PSYCHIATRIC HISTORY   According to Dr. Soares's notes:    Patient reports that she has had problems with depression since age 11. As a teen she had multiple hospitalizations for suicidal thoughts and self harm behavior, especially cutting. She was treated with medications including Effexor, Naltrexone, Lamictal, Melatonin. She carries the diagnosis of Bipolar disorder. She denies periods of elevated mood but will have periods of more racing thoughts and sleeplessness with increased agitation and increased self harm. She was hospitalized on 5500 in February 2020. She was also admitted at [facility] in June of 2020. Record indicates that she also has a history of developmental delay and Borderline personality disorder.          PAST MEDICAL HISTORY   Past Medical History:   Diagnosis Date     Borderline personality disorder (H)      Depression      Intellectual disability      Obesity      Syncope        Past Surgical History:   Procedure Laterality Date     APPENDECTOMY         Primary Care Provider: [facility]    ALLERGIES: Penicillins       MEDICATIONS     Current Facility-Administered Medications:      acetaminophen (TYLENOL) tablet 650 mg, 650 mg, Oral, Q4H PRN, Rashid Sanchez MD     alum & mag hydroxide-simethicone (MAALOX) suspension 30 mL, 30 mL, Oral, Q4H PRN, Rashid Sanchez MD     calcium carbonate (TUMS) chewable tablet 500 mg, 500 mg, Oral, TID AC, Tavo Horn, APRN CNP, 500 mg at 07/13/21 1139     chlorhexidine (PERIDEX) 0.12 % solution 15 mL, 15 mL, Swish & Spit, BID, Rashid Sanchez MD, 15 mL at 07/12/21 2014     haloperidol (HALDOL) tablet 5 mg, 5 mg, Oral, Q8H PRN **AND** LORazepam (ATIVAN) tablet 2 mg, 2 mg, Oral, Q8H PRN **AND** diphenhydrAMINE (BENADRYL) capsule 50 mg, 50 mg, Oral, Q8H PRN, Rashid Sanchez MD     haloperidol lactate (HALDOL) injection 5 mg, 5 mg, Intramuscular, Q8H PRN **AND** LORazepam  (ATIVAN) injection 2 mg, 2 mg, Intramuscular, Q8H PRN **AND** diphenhydrAMINE (BENADRYL) injection 50 mg, 50 mg, Intramuscular, Q8H PRN, Rashid Sanchez MD     docusate sodium (COLACE) capsule 100 mg, 100 mg, Oral, BID, Tavo Horn APRN CNP, 100 mg at 07/13/21 1140     hydrocortisone (CORTAID) 1 % ointment, , Topical, BID PRN, Tavo Horn APRN CNP     hydrOXYzine (ATARAX) tablet 50 mg, 50 mg, Oral, Q4H PRN, Rashid Sanchez MD, 50 mg at 07/12/21 1607     ibuprofen (ADVIL/MOTRIN) tablet 400 mg, 400 mg, Oral, TID PRN, Rashid Sanchez MD, 400 mg at 07/12/21 1607     lithium (ESKALITH) capsule 150 mg, 150 mg, Oral, At Bedtime, Rashid Sanchez MD, 150 mg at 07/12/21 2015     melatonin tablet 3 mg, 3 mg, Oral, At Bedtime PRN, Rashid Sanchez MD     naltrexone (DEPADE/REVIA) tablet 50 mg, 50 mg, Oral, At Bedtime, Rashid Sanchez MD, 50 mg at 07/12/21 2015     norgestimate-ethinyl estradiol (ORTHO-CYCLEN) 0.25-35 MG-MCG per tablet 1 tablet, 1 tablet, Oral, Daily, Rashid Sanchez MD, 1 tablet at 07/13/21 1140     pantoprazole (PROTONIX) EC tablet 40 mg, 40 mg, Oral, BID AC, Rashid Sanchez MD, 40 mg at 07/13/21 0723     polyethylene glycol (MIRALAX) Packet 17 g, 17 g, Oral, Daily, Rashid Sanchez MD, 17 g at 07/13/21 1139     polyethylene glycol (MIRALAX) Packet 17 g, 17 g, Oral, Daily PRN, Rashid Sanchez MD     traZODone (DESYREL) tablet 50 mg, 50 mg, Oral, At Bedtime, Rashid Sanchez MD, 50 mg at 07/12/21 2014     traZODone (DESYREL) tablet 50 mg, 50 mg, Oral, At Bedtime PRN, Rashid Sanchez MD     venlafaxine (EFFEXOR-XR) 24 hr capsule 300 mg, 300 mg, Oral, Daily, Rashid Sanchez MD, 300 mg at 07/13/21 1140     Vitamin D3 (CHOLECALCIFEROL) tablet 1,000 Units, 1,000 Units, Oral, Daily, Daniel Provider, MD, 1,000 Units at 07/13/21 1140  acetaminophen, aluminum-magnesium hydroxide-simethicone, calcium (as carbonate),  chlorhexidine, haloperidoL **AND** LORazepam **AND** diphenhydrAMINE, haloperidol lactate **AND** LORazepam **AND** diphenhydrAMINE, docusate sodium, hydrOXYzine pamoate, ibuprofen, melatonin, omeprazole, ondansetron, polyethylene glycol, traZODone, traZODone    Medication adherence: patient was refusing medications past few days  Medication side effects: none  Benefit: good       ROS     Review of Systems is otherwise negative including HEENT, CV, Respiratory, GI, , Musculoskeletal, Neurologic, Dermatologic, Endocrine, Immunological, Constitutional systems         FAMILY HISTORY   Family History   Problem Relation Age of Onset     Diabetes type I Father      Cancer Paternal Grandfather         Psychiatric: father had mood problems  Chemical: father had alcohol problems  Suicide: none       SOCIAL HISTORY   Social History     Socioeconomic History     Marital status: Single     Spouse name: Not on file     Number of children: Not on file     Years of education: Not on file     Highest education level: Not on file   Occupational History     Not on file   Social Needs     Financial resource strain: Not on file     Food insecurity     Worry: Not on file     Inability: Not on file     Transportation needs     Medical: Not on file     Non-medical: Not on file   Tobacco Use     Smoking status: Current Every Day Smoker     Packs/day: 1.50     Smokeless tobacco: Former User   Substance and Sexual Activity     Alcohol use: Not Currently     Drug use: Not Currently     Sexual activity: Not Currently   Lifestyle     Physical activity     Days per week: Not on file     Minutes per session: Not on file     Stress: Not on file   Relationships     Social connections     Talks on phone: Not on file     Gets together: Not on file     Attends Latter day service: Not on file     Active member of club or organization: Not on file     Attends meetings of clubs or organizations: Not on file     Relationship status: Not on file      "Intimate partner violence     Fear of current or ex partner: Not on file     Emotionally abused: Not on file     Physically abused: Not on file     Forced sexual activity: Not on file   Other Topics Concern     Not on file   Social History Narrative    02/15/17 - Patient resides with her mother, father, and sister.        Education: HS grad attended special ed  Occupation: unemployed  Relationship Status:  single  Family and Living Situation: lives in residential facility. Sees mother regularly  Children: none             MENTAL STATUS EXAM     Mental Status  Patient is using scrubs  Hygiene good  Speech fluent  Thought Process concrete  Thought Content: No suicidal ideation,    no homicidal ideation,   No ideas of reference,    No loose associations,    No auditory hallucinations,     No visual hallucinations, patient denies having any hallucinations but she seems internally preoccupied today   No delusions  Psychomotor: some agitation today  Cognition:  Alert and oriented to time place and person  Attention good  Concentration fair  Memory normal including recent and remote memory  Mood:  Depressed   Affect: dismissive and irritable   Judgement: limited  Eye contact good  Cooperation good  Language normal  Fund of knowledge normal  Musculoskeletal normal gait with no abnormal movements  Minimal change in mental status in the past 24 hours       PHYSICAL EXAM   /69   Pulse 57   Temp 98.1  F (36.7  C)   Resp 17   Ht 1.6 m (5' 3\")   Wt 101.2 kg (223 lb)   LMP  (LMP Unknown)   SpO2 99%   BMI 39.50 kg/m      This is an adult in no distress with normal breathing and no abnormal movements.     Physical Exam was performed in ER. Please see record.           LABS   Admission on 07/10/2021, Discharged on 07/10/2021   Component Date Value Ref Range Status     Interpretation ECG 07/10/2021 Click View Image link to view waveform and result   Final     Hemoglobin 07/10/2021 12.6  11.7 - 15.7 g/dL Final     Sodium " 07/10/2021 138  133 - 144 mmol/L Final     Potassium 07/10/2021 3.9  3.4 - 5.3 mmol/L Final     Chloride 07/10/2021 110* 94 - 109 mmol/L Final     Carbon Dioxide 07/10/2021 25  20 - 32 mmol/L Final     Anion Gap 07/10/2021 3  3 - 14 mmol/L Final     Glucose 07/10/2021 82  70 - 99 mg/dL Final     Urea Nitrogen 07/10/2021 8  7 - 30 mg/dL Final     Creatinine 07/10/2021 0.77  0.52 - 1.04 mg/dL Final     GFR Estimate 07/10/2021 >90  >60 mL/min/[1.73_m2] Final    Comment: Non  GFR Calc  Starting 12/18/2018, serum creatinine based estimated GFR (eGFR) will be   calculated using the Chronic Kidney Disease Epidemiology Collaboration   (CKD-EPI) equation.       GFR Estimate If Black 07/10/2021 >90  >60 mL/min/[1.73_m2] Final    Comment:  GFR Calc  Starting 12/18/2018, serum creatinine based estimated GFR (eGFR) will be   calculated using the Chronic Kidney Disease Epidemiology Collaboration   (CKD-EPI) equation.       Calcium 07/10/2021 8.9  8.5 - 10.1 mg/dL Final     Glucose 07/10/2021 93  70 - 99 mg/dL Final     Troponin I 07/10/2021 0.00  0.00 - 0.08 ug/L Final     SARS-CoV-2 Virus Specimen Source 07/10/2021 Nasopharyngeal   Final     SARS-CoV-2 PCR Result 07/10/2021 NEGATIVE   Final    SARS-CoV2 (COVID-19) RNA not detected, presumed negative.     SARS-CoV-2 PCR Comment 07/10/2021 (Note)   Final    Comment: Testing was performed using the nestor SARS-CoV-2 & Influenza A/B Assay on the   nestor Tamar System.  This test should be ordered for the detection of SARS-COV-2 in individuals who   meet SARS-CoV-2 clinical and/or epidemiological criteria. Test performance is   unknown in asymptomatic patients.  This test is for in vitro diagnostic use under the FDA EUA for laboratories   certified under CLIA to perform moderate and/or high complexity testing. This   test has not been FDA cleared or approved.  A negative test does not rule out the presence of PCR inhibitors in the   specimen or target RNA  in concentration below the limit of detection for the   assay. The possibility of a false negative should be considered if the   patient's recent exposure or clinical presentation suggests COVID-19.  [facility] are certified under the Clinical Laboratory   Improvement Amendments of 1988 (CLIA-88) as qualified to perform moderate   and/or high complexity laboratory testing.              DIAGNOSIS     Diagnosis and Principal Problem:    Adjustment disorder with mixed disturbance of emotions and conduct    Active Problem List:  Patient Active Problem List   Diagnosis     MENTAL HEALTH     Suicidal ideation     Suicidal ideations     Intellectual disability     Bipolar affective disorder, currently depressed, moderate (H)     Borderline personality disorder (H)            PLAN   1. Education given regarding diagnostic and treatment options with risks, benefits and alternatives and adequate verbalization of understanding.  2. Admitted voluntary. Will try to coordinate discharge soon  3. Medications:      Lithium 150mg at bedtime       Naltrexone 50mg at bedtime       Trazodone 50mg at bedtime       Effexor 300mg daily   4. Hospitalist following the patient. Neurology consult also has been placed.   5.  to follow regarding collecting and reviewing collateral information, referrals, and disposition planning        Further treatment programming to be determined throughout the hospital course.    Care discussed with Care/Treatment Team Members, Chart reviewed and Patient seen    Patient seen, chart reviewed, case reviewed with  and with nursing.  Case reviewed in multi-disciplinary treatment team.    Luzma Norris MD                  CERTIFICATION       Re-Certification I certify that the inpatient psychiatric facility services furnished since the previous certification were, and continue to be, medically necessary for, either, treatment which could reasonably be  expected to improve the patient s condition or diagnostic study and that the hospital records indicate that the services furnished were, either, intensive treatment services, admission and related services necessary for diagnostic study, or equivalent services.     I certify that the patient continues to need, on a daily basis, active treatment furnished directly by or requiring the supervision of inpatient psychiatric facility personnel.   I estimate 7 days of hospitalization is necessary for proper treatment of the patient. My plans for post-hospital care for this patient are group home     Luzma Norris MD      -     July 13, 2021

## 2021-07-13 NOTE — PLAN OF CARE
"Patient spent majority of the shift isolative to her room, only coming out for meals and briefly sitting out in the milieu this morning on the lounge chair.  Patient endorsed  depression 8.  Patient given her scheduled medications for her depression.  Patient weepy intermittently throughout the shift, stating, \" I want to see my dad.\"   Patient slept intermittently  with her 1:1 sitter present for safety  Problem: Behavioral Health Plan of Care  Goal: Adheres to Safety Considerations for Self and Others  Outcome: No Change  Goal: Absence of New-Onset Illness or Injury  Outcome: No Change  Goal: Optimized Coping Skills in Response to Life Stressors  Outcome: No Change     "

## 2021-07-13 NOTE — PROGRESS NOTES
07/13/21 1415   Engagement   Intervention Group   Topic Detail OT: Education regarding the 8 Dimensions of Wellness and hands on creative hands on endeavor (watercolor) to increase concentration, attention to task/detail, problem solving, creative expression, coping with stress, healthy leisure engagement, and social engagement   Attendance Attended   Patient Response No evidence of learning   Concentrated on Task < 5 min   Mood/Affect Sad;Tearful;Blunted   Social/Behavioral Guarded;Isolative   Thought Content Poverty of thought     Pt in group area for approximately 15 minutes and attended to task of looking through available projects for 3 minutes before declining to engage in presented activity. Pt left group and did note return.    1:1 present

## 2021-07-13 NOTE — PLAN OF CARE
Patient slept all night without any visible distress, safety checks were completed every 15 minutes, no pain was reported. Patient denied anxiety, depression and hallucination. No PRN was given, we will continue to monitor. Patient was on 1:1 for safety.

## 2021-07-13 NOTE — PROGRESS NOTES
07/13/21 0930   Engagement   Intervention Group   Topic Detail OT: Education regarding the 8 Dimensions of Wellness and interactive social activity (Bananagrams) to increase intellectual wellness, cognitive processing speed, memory recall, visual scanning, healthy leisure engagement, social wellness, and social engagement   Attendance Did not attend   Reason for Not Attending Excused     1:1 reported pt was sleeping after being dysregulated for quite some time; pt not woken for invite.

## 2021-07-13 NOTE — PROGRESS NOTES
7/13/21  1:00PM   Engagement   Intervention Group   Topic DBT Skills   Topic Detail ACCEPTS   Attendance Declined to attend

## 2021-07-13 NOTE — PLAN OF CARE
"[facility]  Occupational Therapy Assessment Note    Patient Name:  Sadaf Ross    D:  Patient was seen for 0 minutes for orientation to therapist role, group program, and assessment of needs. Patient was sleeping both times therapist approached. 1:1 staff unable to locate a completed OT Questionnaire.     Per chart review:  Sadaf Ross is a 20 year old female with a medical history significant for bipolar 1 disorder, borderline personality disorder, depression, intellectual disability, and suicidal ideations who presents to the emergency department for evaluation of nausea and vomiting. She denies improvement in her symptoms from her visit yesterday.  She notes a syncopal episode at her group home today in which she collapsed onto a couch.  She felt cold and \"crummy\" immediately before this episode. She is unable to recall what happened after collapsing. Patient endorses nausea, 3 episodes of vomiting, abdominal pain, chest pain, and shortness of breath. She denies incontinence.      Per DEC , patient reports multiple stressors: her father  in February, the rest of her family (mother and two sisters) are avoiding her, and her girlfriend is moving out of the group home that she is staying at.  Patient states she has been biting herself as self injury, stating that it makes her feel calmer.  Patient endorses chronic suicidal ideation.  She says it is worse than usual and she doesn't feel safe being discharged.    Psychiatrist:  KHANH Romero    Patient is a group home resident.  Reportedly does have family including her mom and sister who reportedly do not communicate with her.  She was close to her dad who passed away in February of this year.  She is unmarried and does not have kids.  Reportedly she was in relationship with another female but her partner is now moving.      A: Patient appears to have limited knowledge of and follow through with coping skills and " self-management strategies, thus contributing to exacerbation of mental health symptoms.    Patient appears to have the following strengths: out on the unit for meals and willing to engage with staff.    Patient appears to have the following limitations/barriers: severity of sx s, maladaptive coping skills, hopeless/disengaged, cognitive challenges, and lack of purposeful routine    Patient may benefit from engaging appropriately in Occupational Therapy groups: coping skills, stress management, social skills, role skills, healthy movement, leisure assessment and exploration, time management, attention/concentration strategies, goal setting and follow through, improving hopefulness, building a purposeful routine, and identifying wellness or relapse prevention strategies.      P: Initiate care plan goals and interventions.    Plan for Next Treatment: continue to build rapport with patient and encourage group attendance; continue to assess patient for appropriateness for 1:1 interventions    Melisa Rosa OT  Date: 7/13/2021  Time: 1:54 PM

## 2021-07-13 NOTE — PROGRESS NOTES
Assessment/Intervention/Current Symtoms and Care Coordination  Writer approached patient at three separate times to discuss discharge plans. Patient was sound asleep with lights off and shades drawn. On the third approach patient engaged with writer stating that she was tired but would get up for lunch. Patient noted no concerns. She agreed that she would like to return to her group however is willing to remain voluntarily until stabilized. Group home will accept patient when stabilized.    Discharge Plan or Goal: Return to group home with established supports (DBT Therapist, Individual Therapist and Psychiatry)    Barriers to Discharge Symptom Stabilization, Medication Management, Care Coordination    Referral Status pending-    Pt contacts  [facility]  [phone]  [facility]  [address] [facility] [place], [place] [address] [phone]  [facility]   Jyoti King  [phone]  [place],   [facility]  [address] [facility].    Legal Status Voluntary      Ayala Viigl MA [facility] CCW, 7/13/2021, 4:12 PM

## 2021-07-14 LAB
C TRACH DNA SPEC QL PROBE+SIG AMP: NEGATIVE
C TRACH DNA SPEC QL PROBE+SIG AMP: NEGATIVE
Lab: NORMAL
N GONORRHOEA DNA SPEC QL NAA+PROBE: NEGATIVE
N GONORRHOEA DNA SPEC QL NAA+PROBE: NEGATIVE
PERFORMING LABORATORY: NORMAL
SPECIMEN STATUS: NORMAL
TEST NAME: NORMAL

## 2021-07-14 PROCEDURE — 128N000001 HC R&B CD/MH ADULT

## 2021-07-14 PROCEDURE — 124N000001 HC R&B MH

## 2021-07-14 PROCEDURE — 250N000013 HC RX MED GY IP 250 OP 250 PS 637

## 2021-07-14 PROCEDURE — 250N000013 HC RX MED GY IP 250 OP 250 PS 637: Performed by: NURSE PRACTITIONER

## 2021-07-14 PROCEDURE — 99232 SBSQ HOSP IP/OBS MODERATE 35: CPT | Performed by: PSYCHIATRY & NEUROLOGY

## 2021-07-14 RX ADMIN — PANTOPRAZOLE SODIUM 40 MG: 40 TABLET, DELAYED RELEASE ORAL at 17:08

## 2021-07-14 RX ADMIN — CALCIUM CARBONATE (ANTACID) CHEW TAB 500 MG 500 MG: 500 CHEW TAB at 17:08

## 2021-07-14 ASSESSMENT — ACTIVITIES OF DAILY LIVING (ADL)
DRESS: SCRUBS (BEHAVIORAL HEALTH);INDEPENDENT
HYGIENE/GROOMING: INDEPENDENT
ORAL_HYGIENE: INDEPENDENT

## 2021-07-14 NOTE — PLAN OF CARE
"Patient spent majority of the shift isolative to her room, only coming out briefly sitting out in the milieu.  Patient endorsed anxiety and  depression 10, she refused her scheduled medications.  Patient weepy intermittently throughout the shift, stating, \" I want to go home.\"   Patient slept intermittently with thoughts of wanting to harm herself by biting her hand.  Problem: Behavioral Health Plan of Care  Goal: Adheres to Safety Considerations for Self and Others  Outcome: No Change  Goal: Absence of New-Onset Illness or Injury  Outcome: No Change  Goal: Optimized Coping Skills in Response to Life Stressors  Outcome: No Change     Problem: Suicide Risk  Goal: Absence of Self-Harm  Outcome: No Change     "

## 2021-07-14 NOTE — PLAN OF CARE
Patient was calm and non-aggressive. Slept all night without any issues. Safety checks were in place. No pain reported and patient denied anxiety, depression and hallucination. We will continue to monitor.

## 2021-07-14 NOTE — PLAN OF CARE
"Alert and oriented x3, able to communicate needs. Flat, pleasant, cooperative and compliant with medications. Patient was visible on unit, social and interactive in milieu. She denied any pain or discomfort, independent with all ADLs. Anxiety rated at 9 and depression at 10, endorsed SI/SIB and visual hallucinations \"I'm seeing my dad\" No SI or SIB behavior observed this shift.  Problem: Behavioral Health Plan of Care  Goal: Adheres to Safety Considerations for Self and Others  Outcome: Improving  Goal: Absence of New-Onset Illness or Injury  Outcome: Improving  Goal: Optimized Coping Skills in Response to Life Stressors  Outcome: Improving  Goal: Develops/Participates in Therapeutic [place] to Support Successful Transition  Outcome: Improving     Problem: Suicidal Behavior  Goal: Suicidal Behavior is Absent or Managed  Outcome: Improving     "

## 2021-07-14 NOTE — PROGRESS NOTES
Assessment/Intervention/Current Symtoms and Care Coordination  Writer met with patient to discuss discharge plans. Patient stated that she has not been taking her dedications for the last two weeks prior to intake. Patient stated that she di not want to take her medications today. Patient was able to acknowledge that her medications are helpful. Despite this acknowledgement she was unable to commit to actions at this time. Patient has expressed that she would like to go back to the group home to be with her girlfriend. Girlfriend has been calling daily in the mornings. Patient identified her personal support system as her grandmother girlfriend and house mate. Patient spoke about the loss of her father and how she feels his presence close to her.  Writer has found past behavorial plan and will implement with patient tomorrow.    Writer spoke to [facility]  Judy. Judy is applying for an [facility] worker and stated that the group home has upped staff in the past. Judy is checking with group home so they can prepare for patient return.    Writer spoke to Lien from [facility] regarding services.    Discharge Plan or Goal: Return to group home with established supports (DBT Therapist, Individual Therapist and Psychiatry) referral to [facility] and increased staffing at intermediate.    Barriers to Discharge Symptom Stabilization, Medication Management, Care Coordination    Referral Status pending-    Pt contacts  [facility]  [phone]    [facility]   [address] [address], [place] [address] [phone]    [facility]     EM[facility]    Jyoti King  [phone]    Arlene,   [facility]  [address] [facility].    Lien [facility]     Legal Status Voluntary      Ayala Vigil MA [facility] CCW, 7/13/2021, 4:12 PM

## 2021-07-14 NOTE — PROGRESS NOTES
"PSYCHIATRY  PROGRESS NOTE     DATE OF SERVICE   7/14/2021         CHIEF COMPLAINT   \" I want to leave the hospital.\"       SUBJECTIVE   Nursing reports: Patient has been telling the nursing staff that she wants to bite herself.  She also has been refusing medications saying that when she does not take medications she can see her dad.    Patient has been reviewed with the  earlier today during team meeting.  Assessment/Intervention/Current Symtoms and Care Coordination  Writer met with patient to discuss discharge plans. Patient stated that she has not been taking her dedications for the last two weeks prior to intake. Patient stated that she di not want to take her medications today. Patient was able to acknowledge that her medications are helpful. Despite this acknowledgement she was unable to commit to actions at this time. Patient has expressed that she would like to go back to the group home to be with her girlfriend. Girlfriend has been calling daily in the mornings. Patient identified her personal support system as her grandmother girlfriend and house mate. Patient spoke about the loss of her father and how she feels his presence close to her.  Writer has found past behavorial plan and will implement with patient tomorrow.     Writer spoke to [facility]  Judy. Judy is applying for an [facility] worker and stated that the group home has upped staff in the past. Judy is checking with group home so they can prepare for patient return.     Writer spoke to Lien from [facility] regarding services.     Discharge Plan or Goal: Return to group home with established supports (DBT Therapist, Individual Therapist and Psychiatry) referral to [facility] and increased staffing at group home.        OBJECTIVE   Patient was seen and evaluated in the day area by herself, this was a face-to-face evaluation.  Upon patient interview, patient reports that she wants to leave the hospital as soon as possible " and wants to return back to her group home.  I asked the patient what that she needs to do in order to be able to leave the hospital safe and patient was able to tell me that she needs to take her medications, go to groups and eat well.  I also added and reminded to the patient that keeping herself safe is one of the priorities.  Patient continues to say that she wants to leave the hospital and return to her group home.  I explained to the patient that given the recent behaviors of not taking her medications and biting herself I do not think the group home is going to accept her right now.  I reminded the patient that she needs to have a few days of food no self-injurious behaviors and consistently taking the medications in order for us to refer her back to the group home.  Patient became very angry at this writer telling me that she is not going to take her medications and she can bite herself whenever she wants to.  Patient show me a bruise in her left hand and tells me that she bit herself yesterday there.  After saying this suddenly patient got up and decided to end the meeting.    I have updated the  on my conversation with the patient.  Patient at this time does not want to sign the 12-hour intent to leave.  Tomorrow this writer and the  will meet with the patient and discussed what would be her discharge options.       MEDICATIONS   Medications:  Scheduled Meds:    calcium carbonate  500 mg Oral TID AC     chlorhexidine  15 mL Swish & Spit BID     docusate sodium  100 mg Oral BID     lithium  150 mg Oral At Bedtime     naltrexone  50 mg Oral At Bedtime     norgestimate-ethinyl estradiol  1 tablet Oral Daily     pantoprazole  40 mg Oral BID AC     polyethylene glycol  17 g Oral Daily     traZODone  50 mg Oral At Bedtime     venlafaxine  300 mg Oral Daily     Vitamin D3  1,000 Units Oral Daily     Continuous Infusions:  PRN Meds:.acetaminophen, alum & mag hydroxide-simethicone,  "haloperidol **AND** LORazepam **AND** diphenhydrAMINE, haloperidol lactate **AND** LORazepam **AND** diphenhydrAMINE, hydrocortisone, hydrOXYzine, ibuprofen, melatonin, polyethylene glycol, traZODone    Medication adherence issues: MS Med Adherence Y/N: No  Medication side effects: MEDICATION SIDE EFFECTS: no side effects reported  Benefit: Yes / No: Yes       ROS   A comprehensive review of systems was negative.       MENTAL STATUS EXAM   Vitals: /67 (BP Location: Right arm)   Pulse 70   Temp 97.7  F (36.5  C) (Oral)   Resp 16   Ht 1.6 m (5' 3\")   Wt 101.2 kg (223 lb)   LMP  (LMP Unknown)   SpO2 96%   BMI 39.50 kg/m      Appearance:  No apparent distress  Mood:  {Mood: Irritable   Affect: blunted  was congruent to speech  Suicidal Ideation: PRESENT / ABSENT: present Currently engaging in self-injurious behaviors  Homicidal Ideation: PRESENT / ABSENT: absent   Thought process: response delay   Thought content: significant for obsessions  and paranoid ideation.   Fund of Knowledge: Below average  Attention/Concentration: Easily distracted  Language ability:  Intact  Memory:  Immediate recall intact, Short-term memory intact and Long-term memory intact  Insight:  limited.  Judgement: limited  Orientation: Yes, x4  Psychomotor Behavior: normal or unremarkable    Muscle Strength and Tone: MuscleStrength: Normal  Gait and Station: Normal       LABS   personally reviewed.     No results found for: PHENYTOIN, PHENOBARB, VALPROATE, CBMZ       DIAGNOSIS   Principal Problem:    Bipolar affective disorder, currently depressed, moderate (H)    Active Problem List:  Patient Active Problem List   Diagnosis     MENTAL HEALTH     Suicidal ideation     Suicidal ideations     Intellectual disability     Bipolar affective disorder, currently depressed, moderate (H)     Borderline personality disorder (H)          PLAN   1. Ongoing education given regarding diagnostic and treatment options with risks, benefits and " alternatives and adequate verbalization of understanding.  2.  Medications       Lithium 150 mg at bedtime if the patient takes this medication tonight I will consider increasing the lithium to 300 mg at bedtime.       Naltrexone 50 mg at bedtime       Effexor 300 mg daily       Trazodone 50 mg at bedtime  3.  Hospitalist to follow-up as needed  4.   in communication with patient's .        Risk Assessment: IP [facility] RISK ASSESSMENT: Patient on precautions    Coordination of Care:   Treatment Plan reviewed and physician signed, Care discussed with Care/Treatment Team Members, Chart reviewed and Patient seen      Re-Certification I certify that the inpatient psychiatric facility services furnished since the previous certification were, and continue to be, medically necessary for, either, treatment which could reasonably be expected to improve the patient s condition or diagnostic study and that the hospital records indicate that the services furnished were, either, intensive treatment services, admission and related services necessary for diagnostic study, or equivalent services.     I certify that the patient continues to need, on a daily basis, active treatment furnished directly by or requiring the supervision of inpatient psychiatric facility personnel.   I estimate 7 days of hospitalization is necessary for proper treatment of the patient. My plans for post-hospital care for this patient are  group home     Luzma Norris MD    -     7/14/2021     -     3:24 PM    Total time  25 minutes with > 50%spent on coordination of cares and psycho-education.    This note was created with help of Dragon dictation system. Grammatical / typing errors are not intentional.    Luzma Norris MD

## 2021-07-14 NOTE — PROGRESS NOTES
"   07/14/21 1415   Engagement   Intervention Group   Topic Detail OT: Education regarding social wellness and creative hands on endeavor (card making) to increase social wellness, coping with stress, communicating with support systems, creative expression, problem solving, frustration tolerance, planning, time management, healthy leisure engagement, and social engagement   Attendance Attended   Patient Response Needs reinforcement/repetition to learn materials;Was respectful;Prosocial behavior   Concentrated on Task 15 - 30 min   Cognition Goal-directed;Follows through with task   Mood/Affect Pleasant;Content   Social/Behavioral Cooperative;Engaged   Thought Content Reality oriented     Pt reported she made a card for her \"girlfriend\" because she \"cares about her\". Pt assistance therapist with clean-up of supplies.   "

## 2021-07-15 LAB
ALBUMIN SERPL-MCNC: 4.2 G/DL (ref 3.5–5)
ALP SERPL-CCNC: 61 U/L (ref 45–120)
ALT SERPL W P-5'-P-CCNC: 39 U/L (ref 0–45)
ANION GAP SERPL CALCULATED.3IONS-SCNC: 12 MMOL/L (ref 5–18)
AST SERPL W P-5'-P-CCNC: 24 U/L (ref 0–40)
BILIRUB SERPL-MCNC: 0.4 MG/DL (ref 0–1)
BUN SERPL-MCNC: 11 MG/DL (ref 8–22)
CALCIUM SERPL-MCNC: 10.3 MG/DL (ref 8.5–10.5)
CHLORIDE BLD-SCNC: 105 MMOL/L (ref 98–107)
CO2 SERPL-SCNC: 22 MMOL/L (ref 22–31)
CREAT SERPL-MCNC: 0.8 MG/DL (ref 0.6–1.1)
ERYTHROCYTE [DISTWIDTH] IN BLOOD BY AUTOMATED COUNT: 12.5 % (ref 10–15)
GFR SERPL CREATININE-BSD FRML MDRD: >90 ML/MIN/1.73M2
GLUCOSE BLD-MCNC: 102 MG/DL (ref 70–125)
HCT VFR BLD AUTO: 38.1 % (ref 35–47)
HGB BLD-MCNC: 13.4 G/DL (ref 11.7–15.7)
MCH RBC QN AUTO: 29.1 PG (ref 26.5–33)
MCHC RBC AUTO-ENTMCNC: 35.2 G/DL (ref 31.5–36.5)
MCV RBC AUTO: 83 FL (ref 78–100)
PLATELET # BLD AUTO: 276 10E3/UL (ref 150–450)
POTASSIUM BLD-SCNC: 4 MMOL/L (ref 3.5–5)
PROT SERPL-MCNC: 8.1 G/DL (ref 6–8)
RBC # BLD AUTO: 4.61 10E6/UL (ref 3.8–5.2)
SODIUM SERPL-SCNC: 139 MMOL/L (ref 136–145)
TSH SERPL DL<=0.005 MIU/L-ACNC: 3.78 UIU/ML (ref 0.3–5)
WBC # BLD AUTO: 12.1 10E3/UL (ref 4–11)

## 2021-07-15 PROCEDURE — 250N000013 HC RX MED GY IP 250 OP 250 PS 637: Performed by: PSYCHIATRY & NEUROLOGY

## 2021-07-15 PROCEDURE — 99207 PR CONSULT E&M CHANGED TO SUBSEQUENT LEVEL: CPT | Performed by: NURSE PRACTITIONER

## 2021-07-15 PROCEDURE — 99232 SBSQ HOSP IP/OBS MODERATE 35: CPT | Performed by: NURSE PRACTITIONER

## 2021-07-15 PROCEDURE — 80053 COMPREHEN METABOLIC PANEL: CPT | Performed by: PSYCHIATRY & NEUROLOGY

## 2021-07-15 PROCEDURE — 85027 COMPLETE CBC AUTOMATED: CPT | Performed by: PSYCHIATRY & NEUROLOGY

## 2021-07-15 PROCEDURE — 124N000001 HC R&B MH

## 2021-07-15 PROCEDURE — 250N000013 HC RX MED GY IP 250 OP 250 PS 637: Performed by: NURSE PRACTITIONER

## 2021-07-15 PROCEDURE — 93010 ELECTROCARDIOGRAM REPORT: CPT | Mod: HIP | Performed by: INTERNAL MEDICINE

## 2021-07-15 PROCEDURE — 250N000013 HC RX MED GY IP 250 OP 250 PS 637

## 2021-07-15 PROCEDURE — 999N000054 HC STATISTIC EKG NON-CHARGEABLE

## 2021-07-15 PROCEDURE — 93005 ELECTROCARDIOGRAM TRACING: CPT | Performed by: PSYCHIATRY & NEUROLOGY

## 2021-07-15 PROCEDURE — 99231 SBSQ HOSP IP/OBS SF/LOW 25: CPT | Performed by: PSYCHIATRY & NEUROLOGY

## 2021-07-15 PROCEDURE — 128N000001 HC R&B CD/MH ADULT

## 2021-07-15 PROCEDURE — 36415 COLL VENOUS BLD VENIPUNCTURE: CPT | Performed by: PSYCHIATRY & NEUROLOGY

## 2021-07-15 PROCEDURE — 84443 ASSAY THYROID STIM HORMONE: CPT | Performed by: PSYCHIATRY & NEUROLOGY

## 2021-07-15 RX ORDER — LACTOBACILLUS RHAMNOSUS GG 10B CELL
1 CAPSULE ORAL 2 TIMES DAILY
Status: DISCONTINUED | OUTPATIENT
Start: 2021-07-15 | End: 2021-07-19 | Stop reason: HOSPADM

## 2021-07-15 RX ORDER — DOXYCYCLINE 100 MG/1
100 CAPSULE ORAL EVERY 12 HOURS SCHEDULED
Status: DISCONTINUED | OUTPATIENT
Start: 2021-07-15 | End: 2021-07-19 | Stop reason: HOSPADM

## 2021-07-15 RX ORDER — CALCIUM CARBONATE 500 MG/1
500 TABLET, CHEWABLE ORAL 2 TIMES DAILY PRN
Status: DISCONTINUED | OUTPATIENT
Start: 2021-07-15 | End: 2021-07-19 | Stop reason: HOSPADM

## 2021-07-15 RX ADMIN — NORGESTIMATE AND ETHINYL ESTRADIOL 1 TABLET: KIT at 08:03

## 2021-07-15 RX ADMIN — PANTOPRAZOLE SODIUM 40 MG: 40 TABLET, DELAYED RELEASE ORAL at 07:54

## 2021-07-15 RX ADMIN — DOCUSATE SODIUM 100 MG: 100 CAPSULE, LIQUID FILLED ORAL at 20:33

## 2021-07-15 RX ADMIN — NALTREXONE HYDROCHLORIDE 50 MG: 50 TABLET, FILM COATED ORAL at 20:33

## 2021-07-15 RX ADMIN — DOXYCYCLINE 100 MG: 100 CAPSULE ORAL at 11:49

## 2021-07-15 RX ADMIN — VENLAFAXINE HYDROCHLORIDE 300 MG: 150 CAPSULE, EXTENDED RELEASE ORAL at 07:52

## 2021-07-15 RX ADMIN — CALCIUM CARBONATE (ANTACID) CHEW TAB 500 MG 500 MG: 500 CHEW TAB at 07:53

## 2021-07-15 RX ADMIN — LITHIUM CARBONATE 450 MG: 300 CAPSULE, GELATIN COATED ORAL at 20:33

## 2021-07-15 RX ADMIN — DOCUSATE SODIUM 100 MG: 100 CAPSULE, LIQUID FILLED ORAL at 07:52

## 2021-07-15 RX ADMIN — CHLORHEXIDINE GLUCONATE 0.12% ORAL RINSE 15 ML: 1.2 LIQUID ORAL at 07:55

## 2021-07-15 RX ADMIN — Medication 1 CAPSULE: at 22:28

## 2021-07-15 RX ADMIN — TRAZODONE HYDROCHLORIDE 50 MG: 50 TABLET ORAL at 20:33

## 2021-07-15 RX ADMIN — DOXYCYCLINE 100 MG: 100 CAPSULE ORAL at 20:33

## 2021-07-15 RX ADMIN — POLYETHYLENE GLYCOL 3350 17 G: 17 POWDER, FOR SOLUTION ORAL at 07:56

## 2021-07-15 RX ADMIN — Medication 1 CAPSULE: at 16:35

## 2021-07-15 RX ADMIN — PANTOPRAZOLE SODIUM 40 MG: 40 TABLET, DELAYED RELEASE ORAL at 16:36

## 2021-07-15 RX ADMIN — Medication 1000 UNITS: at 07:54

## 2021-07-15 RX ADMIN — IBUPROFEN 400 MG: 200 TABLET, FILM COATED ORAL at 16:34

## 2021-07-15 NOTE — PROGRESS NOTES
"Assessment/Intervention/Current Symtoms and Care Coordination  Writer met with patient to discuss discharge plans. Patient is struggling to take medications, refrain from responding to thoughts of SIB and eat her food. Patient room has been transitioned to closer to the nursing station. Writer and patient processed a plan to take her medications/eat her dinner and then headphones would be available to her. Patient was unable to agree to plan. Patient stated that she was having thoughts that she \"wanted to be with her dad and wanted  to be with her girlfriend.\" patient expressed that she has been struggling with missing her father since his death. Patient was able to verbalize that father would support patient taking positive actions by patient to care for self. Patient became tearful and began sobbing. Patient cried for one/two minutes agreed to meet with nursing and was able to self soothe.    Discharge Plan or Goal: Return to group home with established supports (DBT Therapist, Individual Therapist and Psychiatry) referral to [facility] and increased staffing at long term.    Barriers to Discharge Symptom Stabilization, Medication Management, Care Coordination    Referral Status pending- pts has established supports and customized living    Pt contacts  [facility]  [phone]    [facility] advanced behavorial care   [address] [place], [place] [address] [phone]    [facility]     CADI Judy [facility]    Jyoti King  [phone]    Arlene,   [facility]  [address] [facility].    [facility]     Legal Status Voluntary      Ayala Vigil MA [facility] CCW, 7/13/2021, 4:12 PM   "

## 2021-07-15 NOTE — PROGRESS NOTES
07/15/21 4247   Engagement   Intervention Group   Topic Detail OT Welless Strategies-[employer] for social engagement, healthy distraction, focus, direction following, cognitive wellness, and symptom management   Attendance Did not attend   Reason for Not Attending   (in bed on approach-did not attend)

## 2021-07-15 NOTE — PROGRESS NOTES
07/15/21 1538   Engagement   Intervention Group   Topic Detail OT Creative Expressions Group-[employer] for frustration tolerance, creativity, fine motor/coordination, social engagement, focus, following directions, and symptom management   Attendance Attended   Patient Response Expressed feelings/issues   Concentrated on Task 5 - 10 min   Cognition Preoccupied   Mood/Affect Pleasant   Social/Behavioral Redirectable   Pt arrived at end of group. Pt shared she just wanted to observe. Pt shared she does not get to discharge until next week. Pt shared understanding of steps she needs to take prior to discharge. Pt shared she needed to eat to get nourishment.

## 2021-07-15 NOTE — PLAN OF CARE
Problem: Behavioral Health Plan of Care  Goal: Plan of Care Review  Outcome: Improving  Flowsheets  Taken 7/14/2021 1929  Progress: improving  Taken 7/14/2021 1712  Plan of Care Reviewed With: patient  Patient Agreement with Plan of Care: agrees     Problem: Behavioral Health Plan of Care  Goal: Adheres to Safety Considerations for Self and Others  Intervention: Develop and Maintain Individualized Safety Plan  Recent Flowsheet Documentation  Taken 7/14/2021 1712 by Huma Whiteside, RN  Safety Measures:    environmental rounds completed    safety plan reviewed    safety rounds completed    suicide assessment completed     Problem: Suicidal Behavior  Goal: Suicidal Behavior is Absent or Managed  Outcome: Declining  Intervention: Provide Immediate and Ongoing Protective Physical Environment  Recent Flowsheet Documentation  Taken 7/14/2021 1712 by Huma Whiteside, RN  Safe Transition Promotion: (pt was moved closer to nursing station for closer monitoring) personal safety plan developed     Problem: Suicide Risk  Goal: Absence of Self-Harm  Outcome: Declining  Intervention: Assess Risk to Self and Maintain Safety  Recent Flowsheet Documentation  Taken 7/14/2021 1712 by Huma Whiteside, RN  Self-Harm Prevention:    environmental self-harm risks assessed    environment modified for self-harm risk  Intervention: Establish Safety Plan and Continuity of Care  Recent Flowsheet Documentation  Taken 7/14/2021 1712 by Huma Whiteside, RN  Safe Transition Promotion: (pt was moved closer to nursing station for closer monitoring) personal safety plan developed    Pt endorsed lower back pain 8/10, anxiety 10/10, depression 10/10, declined intervention. Endorsed SI with a plan to bite self, reports not feeling safe here, wants to go back to group home, wants to be with girlfriend. Refused to contract for safety. Pt was moved to a room close to the nursing stations for closer monitoring. No suicide/SIB behaviors noted/reported,  continues on precautions. Visible on the unit, social with staff and peers. Took evening medications but refused HS medications, writer attempted a couple times, an other RN reproached but pt refused. Pt reported a itchy area on left hip, area is red, warm to touch, raised, possible a boil. notified charge RN, a hospitalist consult was put in.

## 2021-07-15 NOTE — PROGRESS NOTES
"PSYCHIATRY  PROGRESS NOTE     DATE OF SERVICE   7/15/2021         CHIEF COMPLAINT   \" Get off my face I want to talk to you.\"       SUBJECTIVE   Nursing reports: Pt endorsed lower back pain 8/10, anxiety 10/10, depression 10/10, declined intervention. Endorsed SI with a plan to bite self, reports not feeling safe here, wants to go back to group home, wants to be with girlfriend. Refused to contract for safety. Pt was moved to a room close to the nursing stations for closer monitoring. No suicide/SIB behaviors noted/reported, continues on precautions. Visible on the unit, social with staff and peers. Took evening medications but refused HS medications, writer attempted a couple times, an other RN reproached but pt refused. Pt reported a itchy area on left hip, area is red, warm to touch, raised, possible a boil. notified charge RN, a hospitalist consult was put in.    Patient has been reviewed with the  earlier today. Given that the patient took her medications last night I will wait and see how she does over the weekend. This patient continues to take her medications and does not engage in self-injurious behaviors that I am looking to discharge her next Tuesday.       OBJECTIVE   Patient was seen and evaluated at bedside by herself, this was a face-to-face evaluation. Upon patient interview, patient reports that she does not want to meet with this writer because I make her feel angry.    Patient has been reviewed with her nurse earlier today. It seems that the patient has a good report with the nurse that is working with her today. I discussed with the nurse the plan of to increase lithium at bedtime. If the patient takes her medication and does not engage in self-injurious behaviors then I am looking to discharge her on Tuesday. Nurse also informed me that the patient has been biting herself and she has multiple bite marks in her hands.       MEDICATIONS   Medications:  Scheduled Meds:    chlorhexidine " " 15 mL Swish & Spit BID     docusate sodium  100 mg Oral BID     doxycycline monohydrate  100 mg Oral Q12H DIVYA     lactobacillus rhamnosus (GG)  1 capsule Oral BID     lithium  450 mg Oral At Bedtime     naltrexone  50 mg Oral At Bedtime     norgestimate-ethinyl estradiol  1 tablet Oral Daily     pantoprazole  40 mg Oral BID AC     polyethylene glycol  17 g Oral Daily     traZODone  50 mg Oral At Bedtime     venlafaxine  300 mg Oral Daily     Vitamin D3  1,000 Units Oral Daily     Continuous Infusions:  PRN Meds:.acetaminophen, calcium carbonate, haloperidol **AND** LORazepam **AND** diphenhydrAMINE, haloperidol lactate **AND** LORazepam **AND** diphenhydrAMINE, hydrocortisone, hydrOXYzine, ibuprofen, melatonin, polyethylene glycol, traZODone    Medication adherence issues: MS Med Adherence Y/N: Yes, Hospitalization  Medication side effects: MEDICATION SIDE EFFECTS: no side effects reported  Benefit: Yes / No: Yes       ROS   A comprehensive review of systems was negative.       MENTAL STATUS EXAM   Vitals: /67 (BP Location: Right arm)   Pulse 70   Temp 97.3  F (36.3  C) (Oral)   Resp 18   Ht 1.6 m (5' 3\")   Wt 101.2 kg (223 lb)   LMP  (LMP Unknown)   SpO2 99%   BMI 39.50 kg/m      Appearance:  No apparent distress  Mood:  {Mood: Irritable   Affect: hostile  was congruent to speech  Suicidal Ideation: PRESENT / ABSENT: present She has been engaging in self-injurious behaviors  Homicidal Ideation: PRESENT / ABSENT: absent   Thought process: [place]   Thought content: [facility] preoccupations.   Fund of Knowledge: Below average  Attention/Concentration: Easily distracted  Language ability:  Intact  Memory:  Immediate recall intact, Short-term memory intact and Long-term memory intact  Insight:  limited.  Judgement: limited  Orientation: Yes, x4  Psychomotor Behavior: normal or unremarkable    Muscle Strength and Tone: MuscleStrength: Normal  Gait and Station: Normal       LABS   personally reviewed.     No " results found for: PHENYTOIN, PHENOBARB, VALPROATE, CBMZ       DIAGNOSIS   Principal Problem:    Bipolar affective disorder, currently depressed, moderate (H)    Active Problem List:  Patient Active Problem List   Diagnosis     MENTAL HEALTH     Suicidal ideation     Suicidal ideations     Intellectual disability     Bipolar affective disorder, currently depressed, moderate (H)     Borderline personality disorder (H)          PLAN   1. Ongoing education given regarding diagnostic and treatment options with risks, benefits and alternatives and adequate verbalization of understanding.  2. Medications      Increase lithium to 450 mg at bedtime, lithium level ordered for next Monday      Naltrexone 50 mg at bedtime      Trazodone 50 mg at bedtime      Effexor 300 mg daily      Abilify Maintena 400 mg IM due on August 1, 2021. According to our records last time she took this medication was on July 4, 2021.  3. Medical team currently following the patient.  4.  working on a safe discharge plan.      Risk Assessment: [facility] RISK ASSESSMENT: Patient on precautions    Coordination of Care:   Treatment Plan reviewed and physician signed, Care discussed with Care/Treatment Team Members, Chart reviewed and Patient seen      Re-Certification I certify that the inpatient psychiatric facility services furnished since the previous certification were, and continue to be, medically necessary for, either, treatment which could reasonably be expected to improve the patient s condition or diagnostic study and that the hospital records indicate that the services furnished were, either, intensive treatment services, admission and related services necessary for diagnostic study, or equivalent services.     I certify that the patient continues to need, on a daily basis, active treatment furnished directly by or requiring the supervision of inpatient psychiatric facility personnel.   I estimate 7 days of hospitalization is  necessary for proper treatment of the patient. My plans for post-hospital care for this patient are  group home     Luzma Norris MD    -     7/15/2021     -     12:55 PM    Total time  15 minutes with > 50%spent on coordination of cares and psycho-education.    This note was created with help of Dragon dictation system. Grammatical / typing errors are not intentional.    Luzma Norris MD

## 2021-07-15 NOTE — PLAN OF CARE
"Patient was intermittently out in the milieu, engaged with staff and peers.  Patient was all smiles while out in the milieu.  She spent some time in her room resting.  Patient showed this writer bite marks to her hand late this morning.  Patient endorsed that she was trying to break the skin.  When asked why, she stated, \" I want to go home.\"  Patient encouraged to remain out in the milieu to keep distracted and engaged with her peers to prevent from harming herself, patient stated again, \" I want to go home.\"  Writer reminded patient of the compliance that was needed on her end, in order to be deemed adequate for discharge.   Patient acknowledged plan of care.  Patient was medication compliant, but complained that she didn't want to listen to , stating, \" I don't like being told what to do, it makes me mad.\"   Patient encouraged to eat, she declined breakfast, and ate only 50% for lunch.  Patient endorsed anxiety and depression 10.   1330: Patient in room, crying and biting herself, saying, \" I want to go home, I want to see my dad, I don't want to be alone, I want another doctor, one that I can get along with.  I don't want to be here.  Why don't you just shoot me!\"  Writer acknowledged patient's feelings and suggested coping strategies and possible interventions, to which patient declined.  Writer exited patient's room and 5 mins later patient came out and her face became bright and she was smiling as she joined in a card game with staff.  Writer will continue to monitor and alert oncoming staff of patient's behaviors.     Problem: Behavioral Health Plan of Care  Goal: Adheres to Safety Considerations for Self and Others  Outcome: No Change  Goal: Optimized Coping Skills in Response to Life Stressors  Outcome: No Change     Problem: Suicidal Behavior  Goal: Suicidal Behavior is Absent or Managed  Outcome: No Change     "

## 2021-07-15 NOTE — PLAN OF CARE
BEHAVIORAL TEAM DISCUSSION    Participants:   -Luzma Rodriguez MD  -Devi ARROYO RN  -Melisa KUMARI  -Melisa PRESLEY OT  -Elysia RizzoD  Progress: Declining  Anticipated length of stay: TBD-Medication Noncompliance  Continued Stay Criteria/Rationale: Symptom Stabilization, Medication Management Care Coordination  Medical/Physical:       Obesity       Syncope        Precautions:   Behavioral Orders   Procedures     Self Injury Precaution     Suicide precautions     Patients on Suicide Precautions should have a Combination Diet ordered that includes a Diet selection(s) AND a Behavioral Tray selection for Safe Tray - with utensils, or Safe Tray - NO utensils.  Cutover every 8 hours     Plan: ongoing until urges for SIB subside  Rationale for change in precautions or plan: No changes at this time

## 2021-07-15 NOTE — PROGRESS NOTES
7/15/21  1:00PM      Intervention Group   Topic Skills   Topic Detail Stress Management   Attendance Declined to attend. Pt was reclining in bed with anxious affect.

## 2021-07-15 NOTE — PLAN OF CARE
Patient was calm and slept all night without any distress. Safety checks were in place every 15 minutes. No pain was reported and patient denied anxiety, depression and hallucination, no PRN given, we will continue to monitor.

## 2021-07-15 NOTE — CONSULTS
Brief History: 20 year old female with past medical history of depression, borderline personality disorder, intellectual disability, obesity, ADHD admitted inpatient psychiatry unit for decompensated mental illness.      Hospital Medicine Service re-consulted by psychiatry for boil on left hip.      S: Skin issue prior to admission. Started as small pimple, feels gradually got larger during admission. Pinkish red and painful. No drainage.     O: Alert, ambulatory, Intact small boil/cyst like left lateral hip, mild erythema and demarcated by female chaperone EZEQUIEL Aguila present for examination        A/P:  # Boil/Cyst Left Lateral Hip:  - Doxycyline 100 mg BID x 7 days. Add oral probiotics to avoid associated abx diarrhea.   - Warm packs  - Monitor for erythema spreading beyond demarcated surrounding area. Contact medicine service if erythema worsening, fever above 100.5 F or unable to keep down oral antibiotics.             Medicine sign off.          LINN Man, CNP  [facility]  [facility]

## 2021-07-16 LAB
ATRIAL RATE - MUSE: 80 BPM
DIASTOLIC BLOOD PRESSURE - MUSE: NORMAL MMHG
INTERPRETATION ECG - MUSE: NORMAL
P AXIS - MUSE: 46 DEGREES
PR INTERVAL - MUSE: 172 MS
QRS DURATION - MUSE: 94 MS
QT - MUSE: 374 MS
QTC - MUSE: 431 MS
R AXIS - MUSE: 28 DEGREES
SYSTOLIC BLOOD PRESSURE - MUSE: NORMAL MMHG
T AXIS - MUSE: 21 DEGREES
VENTRICULAR RATE- MUSE: 80 BPM

## 2021-07-16 PROCEDURE — 99231 SBSQ HOSP IP/OBS SF/LOW 25: CPT | Performed by: PSYCHIATRY & NEUROLOGY

## 2021-07-16 PROCEDURE — 250N000013 HC RX MED GY IP 250 OP 250 PS 637: Performed by: PSYCHIATRY & NEUROLOGY

## 2021-07-16 PROCEDURE — 250N000013 HC RX MED GY IP 250 OP 250 PS 637: Performed by: NURSE PRACTITIONER

## 2021-07-16 PROCEDURE — 250N000013 HC RX MED GY IP 250 OP 250 PS 637

## 2021-07-16 PROCEDURE — 124N000001 HC R&B MH

## 2021-07-16 PROCEDURE — 128N000001 HC R&B CD/MH ADULT

## 2021-07-16 RX ADMIN — NALTREXONE HYDROCHLORIDE 50 MG: 50 TABLET, FILM COATED ORAL at 20:25

## 2021-07-16 RX ADMIN — NORGESTIMATE AND ETHINYL ESTRADIOL 1 TABLET: KIT at 08:22

## 2021-07-16 RX ADMIN — VENLAFAXINE HYDROCHLORIDE 300 MG: 150 CAPSULE, EXTENDED RELEASE ORAL at 08:21

## 2021-07-16 RX ADMIN — DOCUSATE SODIUM 100 MG: 100 CAPSULE, LIQUID FILLED ORAL at 20:24

## 2021-07-16 RX ADMIN — Medication 1 CAPSULE: at 20:25

## 2021-07-16 RX ADMIN — Medication 1 CAPSULE: at 08:21

## 2021-07-16 RX ADMIN — CHLORHEXIDINE GLUCONATE 0.12% ORAL RINSE 15 ML: 1.2 LIQUID ORAL at 08:20

## 2021-07-16 RX ADMIN — PANTOPRAZOLE SODIUM 40 MG: 40 TABLET, DELAYED RELEASE ORAL at 17:08

## 2021-07-16 RX ADMIN — TRAZODONE HYDROCHLORIDE 50 MG: 50 TABLET ORAL at 20:32

## 2021-07-16 RX ADMIN — CHLORHEXIDINE GLUCONATE 0.12% ORAL RINSE 15 ML: 1.2 LIQUID ORAL at 20:25

## 2021-07-16 RX ADMIN — DOXYCYCLINE 100 MG: 100 CAPSULE ORAL at 20:25

## 2021-07-16 RX ADMIN — DOCUSATE SODIUM 100 MG: 100 CAPSULE, LIQUID FILLED ORAL at 08:21

## 2021-07-16 RX ADMIN — TRAZODONE HYDROCHLORIDE 50 MG: 50 TABLET ORAL at 20:25

## 2021-07-16 RX ADMIN — HYDROXYZINE HYDROCHLORIDE 50 MG: 50 TABLET ORAL at 20:32

## 2021-07-16 RX ADMIN — LITHIUM CARBONATE 450 MG: 300 CAPSULE, GELATIN COATED ORAL at 20:24

## 2021-07-16 RX ADMIN — PANTOPRAZOLE SODIUM 40 MG: 40 TABLET, DELAYED RELEASE ORAL at 08:21

## 2021-07-16 RX ADMIN — POLYETHYLENE GLYCOL 3350 17 G: 17 POWDER, FOR SOLUTION ORAL at 08:21

## 2021-07-16 RX ADMIN — DOXYCYCLINE 100 MG: 100 CAPSULE ORAL at 08:21

## 2021-07-16 RX ADMIN — Medication 1000 UNITS: at 08:21

## 2021-07-16 RX ADMIN — ACETAMINOPHEN 650 MG: 325 TABLET ORAL at 20:32

## 2021-07-16 ASSESSMENT — ACTIVITIES OF DAILY LIVING (ADL)
DRESS: SCRUBS (BEHAVIORAL HEALTH)
ORAL_HYGIENE: INDEPENDENT
HYGIENE/GROOMING: INDEPENDENT
HYGIENE/GROOMING: SHOWER;WITH SUPERVISION
DRESS: SCRUBS (BEHAVIORAL HEALTH);INDEPENDENT
ORAL_HYGIENE: INDEPENDENT

## 2021-07-16 NOTE — PROGRESS NOTES
"PSYCHIATRY  PROGRESS NOTE     DATE OF SERVICE   7/16/2021         CHIEF COMPLAINT   \" Get out of my room.\"       SUBJECTIVE   Nursing reports:   Pt endorsed lower back pain 9/10 PRN Ibuprofen helpful. Anxiety 10/10 declined intervention, depression 10/10, SI with plan to bite self, visual hallucination \" sees her death dad\". Denied auditory hallucination and refused to contract for safety, on suicide precautions, room near nursing station for closer observation. Pt wants to be discharged back to group home Visible in the milieu early part of the shift, socializing with peers. Had an episode where she reported having a seizure while taking a shower, not witnessed. Writer asked pt to describe what she experienced, stated\" I show my dad and was shaking allover\". Vital sign were taken, BP higher than baseline 136/90( with activity just coming from shower), later rechecked after resting was 119/57. Pt was alert and oriented x 4, not in distress, staff will continue to monitor and update MD as need be. Pt ate 100 % dinner and 120 ml fluid intake. Took all medications except Peridex. No SIB observed/reported, continues on precautions.    Patient has been reviewed with the  earlier today.  Assessment/Intervention/Current Symtoms and Care Coordination  Writer met with patient to discuss discharge plans. Patient reported that she had taken her medication this morning. She was able to verbalize that she needs to continue to take her medications however was unable to commit at this time. Patient processed information that she will need to be medication compliant in order to discharge on Tuesday. Patient processed feelings of anger. She expressed that she was angry with psychiatry however was able to understand that she needs to take her medications. Patient noted that she has been able to cope by socializing with peers.      Discharge Plan or Goal: Return to group home with established supports (DBT Therapist, " Individual Therapist and Psychiatry) referral to [facility] and increased staffing at [facility].     Barriers to Discharge Symptom Stabilization, Medication Management, Care Coordination       OBJECTIVE   Patient was seen and evaluated at bedside by herself, this was a face-to-face evaluation. Upon patient interview, patient reports that she does not want to talk to this writer and asked me to leave her alone.  Despite this writer trying to engage the patient in a conversation she refused.    I have observed the patient throughout the day spending most of the time outside her room, interacting with certain peers and I have observed her laughing and at times joking.  She seemed to be respectful towards other staff members and other patients.  She also has tried to engage different patients in the activities of the unit.       MEDICATIONS   Medications:  Scheduled Meds:    [START ON 8/1/2021] ARIPiprazole ER  400 mg Intramuscular Q28 Days     chlorhexidine  15 mL Swish & Spit BID     docusate sodium  100 mg Oral BID     doxycycline monohydrate  100 mg Oral Q12H DIVYA     lactobacillus rhamnosus (GG)  1 capsule Oral BID     lithium  450 mg Oral At Bedtime     naltrexone  50 mg Oral At Bedtime     norgestimate-ethinyl estradiol  1 tablet Oral Daily     pantoprazole  40 mg Oral BID AC     polyethylene glycol  17 g Oral Daily     traZODone  50 mg Oral At Bedtime     venlafaxine  300 mg Oral Daily     Vitamin D3  1,000 Units Oral Daily     Continuous Infusions:  PRN Meds:.acetaminophen, calcium carbonate, haloperidol **AND** LORazepam **AND** diphenhydrAMINE, haloperidol lactate **AND** LORazepam **AND** diphenhydrAMINE, hydrocortisone, hydrOXYzine, ibuprofen, melatonin, polyethylene glycol, traZODone    Medication adherence issues: MS Med Adherence Y/N: Yes, Hospitalization  Medication side effects: MEDICATION SIDE EFFECTS: no side effects reported  Benefit: Yes / No: Yes       ROS   A comprehensive review of systems was  "negative.       MENTAL STATUS EXAM   Vitals: /57 (BP Location: Right arm)   Pulse 67   Temp 97.8  F (36.6  C) (Oral)   Resp 17   Ht 1.6 m (5' 3\")   Wt 101.2 kg (223 lb)   LMP  (LMP Unknown)   SpO2 100%   BMI 39.50 kg/m      Appearance:  No apparent distress  Mood:  {Mood: Irritable   Affect: hostile  was congruent to speech (mostly towards this writer)  Suicidal Ideation: Refused the assessment  Homicidal Ideation: Refused the assessment  Thought process: [place]   Thought content: CAPRI preoccupations mostly about going home  Fund of Knowledge: Below average  Attention/Concentration: Limited  Language ability:  Intact  Memory:  Immediate recall intact, Short-term memory intact and Long-term memory intact  Insight:  limited.  Judgement: limited  Orientation: Yes, x4  Psychomotor Behavior: normal or unremarkable    Muscle Strength and Tone: MuscleStrength: Normal  Gait and Station: Normal       LABS   personally reviewed.     No results found for: PHENYTOIN, PHENOBARB, VALPROATE, CBMZ       DIAGNOSIS   Principal Problem:    Bipolar affective disorder, currently depressed, moderate (H)    Active Problem List:  Patient Active Problem List   Diagnosis     MENTAL HEALTH     Suicidal ideation     Suicidal ideations     Intellectual disability     Bipolar affective disorder, currently depressed, moderate (H)     Borderline personality disorder (H)          PLAN   1. Ongoing education given regarding diagnostic and treatment options with risks, benefits and alternatives and adequate verbalization of understanding.  2. Medications      lithium to 450 mg at bedtime, lithium level ordered for next Monday      Naltrexone 50 mg at bedtime      Trazodone 50 mg at bedtime      Effexor 300 mg daily      Abilify Maintena 400 mg IM due on August 1, 2021. According to our records last time she took this medication was on July 4, 2021.  3. Medical team currently following the patient.  4.  working on a safe " discharge plan.      Risk Assessment: [facility] RISK ASSESSMENT: Patient on precautions    Coordination of Care:   Treatment Plan reviewed and physician signed, Care discussed with Care/Treatment Team Members, Chart reviewed and Patient seen      Re-Certification I certify that the inpatient psychiatric facility services furnished since the previous certification were, and continue to be, medically necessary for, either, treatment which could reasonably be expected to improve the patient s condition or diagnostic study and that the hospital records indicate that the services furnished were, either, intensive treatment services, admission and related services necessary for diagnostic study, or equivalent services.     I certify that the patient continues to need, on a daily basis, active treatment furnished directly by or requiring the supervision of inpatient psychiatric facility personnel.   I estimate 7 days of hospitalization is necessary for proper treatment of the patient. My plans for post-hospital care for this patient are  group home     Luzma Norris MD    -     7/16/2021     -     1:48 PM    Total time  15 minutes with > 50%spent on coordination of cares and psycho-education.    This note was created with help of Dragon dictation system. Grammatical / typing errors are not intentional.    Luzma Norris MD

## 2021-07-16 NOTE — PLAN OF CARE
Problem: Behavioral Health Plan of Care  Goal: Adheres to Safety Considerations for Self and Others  Outcome: No Change     Problem: Suicidal Behavior  Goal: Suicidal Behavior is Absent or Managed  Outcome: No Change     Problem: Suicide Risk  Goal: Absence of Self-Harm  Outcome: No Change     Problem: Sleep Disturbance  Goal: Adequate Sleep/Rest  Outcome: No Change    Patient has remained free from harm and has been rounded on every 15 minutes per policy. Patient has demonstrated non labored  breathing throughout the night. Patient has not verbalized any pain. Patient voiced no SI/HI. Patient has slept greater than 6 hours thus far.

## 2021-07-16 NOTE — PLAN OF CARE
"  Problem: Behavioral Health Plan of Care  Goal: Plan of Care Review  Outcome: No Change     Problem: Suicidal Behavior  Goal: Suicidal Behavior is Absent or Managed  Outcome: No Change     Problem: Suicide Risk  Goal: Absence of Self-Harm  Outcome: No Change     Problem: Behavioral Health Plan of Care  Goal: Optimized Coping Skills in Response to Life Stressors  Outcome: Improving     Problem: Behavioral Health Plan of Care  Goal: Develops/Participates in Therapeutic [place] to Support Successful Transition  Outcome: Improving     Patient alert and cooperative; bright and pleasant on approach. Anxiety 8/10 and depression 9/10; endorses suicidal thoughts with a plan to \"bite self\". No biting behaviors at this time. Pt also endorses occasional auditory hallucinations, although none presently. Denies VH and HI. Boil to left hip healing and slightly red, pt denies pain. Pt engaged with peers and staff and attended therapy. Will continue to monitor.  Rico Carl RN   "

## 2021-07-16 NOTE — PLAN OF CARE
"  Problem: Behavioral Health Plan of Care  Goal: Adheres to Safety Considerations for Self and Others  Outcome: No Change  Intervention: Develop and Maintain Individualized Safety Plan  Recent Flowsheet Documentation  Taken 7/15/2021 1634 by Huma Whiteside, RN  Safety Measures:    environmental rounds completed    safety plan reviewed    safety rounds completed    suicide assessment completed     Problem: Suicidal Behavior  Goal: Suicidal Behavior is Absent or Managed  Outcome: No Change  Intervention: Provide Immediate and Ongoing Protective Physical Environment  Recent Flowsheet Documentation  Taken 7/15/2021 1634 by Huma Whiteside, RN  Safe Transition Promotion: protective factors promoted     Problem: Suicide Risk  Goal: Absence of Self-Harm  Outcome: No Change  Intervention: Establish Safety Plan and Continuity of Care  Recent Flowsheet Documentation  Taken 7/15/2021 1634 by Huma Whiteside, RN  Safe Transition Promotion: protective factors promoted     Pt endorsed lower back pain 9/10 PRN Ibuprofen helpful. Anxiety 10/10 declined intervention, depression 10/10, SI with plan to bite self, visual hallucination \" sees her death dad\". Denied auditory hallucination and refused to contract for safety, on suicide precautions, room near nursing station for closer observation. Pt wants to be discharged back to group home Visible in the milieu early part of the shift, socializing with peers. Had an episode where she reported having a seizure while taking a shower, not witnessed. Writer asked pt to describe what she experienced, stated\" I show my dad and was shaking allover\". Vital sign were taken, BP higher than baseline 136/90( with activity just coming from shower), later rechecked after resting was 119/57. Pt was alert and oriented x 4, not in distress, staff will continue to monitor and update MD as need be. Pt ate 100 % dinner and 120 ml fluid intake. Took all medications except Peridex. No SIB observed/reported, " continues on precautions.

## 2021-07-16 NOTE — PROGRESS NOTES
Assessment/Intervention/Current Symtoms and Care Coordination  Writer met with patient to discuss discharge plans. Patient reported that she had taken her medication this morning. She was able to verbalize that she needs to continue to take her medications however was unable to commit at this time. Patient processed information that she will need to be medication compliant in order to discharge on Tuesday. Patient processed feelings of anger. She expressed that she was angry with psychiatry however was able to understand that she needs to take her medications. Patient noted that she has been able to cope by socializing with peers.     Discharge Plan or Goal: Return to group home with established supports (DBT Therapist, Individual Therapist and Psychiatry) referral to [facility] and increased staffing at [facility].    Barriers to Discharge Symptom Stabilization, Medication Management, Care Coordination    Referral Status pending- pts has established supports and customized living    Pt contacts  [facility]      [facility]   [address], [place], [place] [id number] [phone]    [facility]     [facility]    Jyoti King  [phone]    [facility]   [facility]  [address] [facility].    [facility]     Legal Status Voluntary      Ayala Vigil MA [facility] CCW, 7/13/2021, 4:12 PM

## 2021-07-16 NOTE — PROGRESS NOTES
07/16/21 1305   Engagement   Intervention Group   Topic Detail OT Creative Expressions group-copper tooling day 2 for creativity, focus, following directions, problem solving, social engagement, and symptom management   Attendance Attended   Patient Response Expressed feelings/issues   Concentrated on Task 5 - 10 min   Cognition Preoccupied   Patient arrived late for group. Pt asked if she could sit and observe. Pt openly shared details about her feelings, relationships with family and her father's death. Pt appeared dismissive of peers and staff's suggestions. Pt asked a peer to walk with her at the end of group.

## 2021-07-16 NOTE — PROGRESS NOTES
"   07/16/21 1542   Engagement   Intervention Group   Topic Detail OT Creative Expression group-finish copper tooling, frame painting and healthy distraction education for social engagement, creativity, symptom management, direction following and healthy distraction   Attendance   (pt came to classroom briefly to interact with staff and peers)   Patient Response Expressed feelings/issues;Was dismissive   Concentrated on Task < 5 min   Pt came to classroom to check out patient's projects. Pt shared she was sweaty because she was not eating or drinking. When therapist reiterated conversation from yesterday when patient stated she would eat and drink more so she could discharge pt stated \"things have changed since then. Pt left group shortly afterwards.   "

## 2021-07-17 LAB — MISCELLANEOUS TEST 1 (ARUP): NORMAL

## 2021-07-17 PROCEDURE — 250N000013 HC RX MED GY IP 250 OP 250 PS 637

## 2021-07-17 PROCEDURE — 250N000013 HC RX MED GY IP 250 OP 250 PS 637: Performed by: PSYCHIATRY & NEUROLOGY

## 2021-07-17 PROCEDURE — 128N000001 HC R&B CD/MH ADULT

## 2021-07-17 PROCEDURE — 124N000001 HC R&B MH

## 2021-07-17 PROCEDURE — 250N000013 HC RX MED GY IP 250 OP 250 PS 637: Performed by: NURSE PRACTITIONER

## 2021-07-17 RX ADMIN — LITHIUM CARBONATE 450 MG: 300 CAPSULE, GELATIN COATED ORAL at 20:16

## 2021-07-17 RX ADMIN — PANTOPRAZOLE SODIUM 40 MG: 40 TABLET, DELAYED RELEASE ORAL at 20:17

## 2021-07-17 RX ADMIN — TRAZODONE HYDROCHLORIDE 50 MG: 50 TABLET ORAL at 20:16

## 2021-07-17 RX ADMIN — Medication 1 CAPSULE: at 20:16

## 2021-07-17 RX ADMIN — DOXYCYCLINE 100 MG: 100 CAPSULE ORAL at 20:17

## 2021-07-17 RX ADMIN — HYDROXYZINE HYDROCHLORIDE 50 MG: 50 TABLET ORAL at 20:16

## 2021-07-17 RX ADMIN — PANTOPRAZOLE SODIUM 40 MG: 40 TABLET, DELAYED RELEASE ORAL at 07:24

## 2021-07-17 RX ADMIN — NALTREXONE HYDROCHLORIDE 50 MG: 50 TABLET, FILM COATED ORAL at 20:17

## 2021-07-17 RX ADMIN — DOCUSATE SODIUM 100 MG: 100 CAPSULE, LIQUID FILLED ORAL at 20:16

## 2021-07-17 RX ADMIN — CHLORHEXIDINE GLUCONATE 0.12% ORAL RINSE 15 ML: 1.2 LIQUID ORAL at 20:17

## 2021-07-17 ASSESSMENT — ACTIVITIES OF DAILY LIVING (ADL)
HYGIENE/GROOMING: INDEPENDENT
ORAL_HYGIENE: INDEPENDENT
HYGIENE/GROOMING: SHOWER;INDEPENDENT
ORAL_HYGIENE: INDEPENDENT
DRESS: SCRUBS (BEHAVIORAL HEALTH);INDEPENDENT
DRESS: INDEPENDENT

## 2021-07-17 ASSESSMENT — MIFFLIN-ST. JEOR: SCORE: 1716.17

## 2021-07-17 NOTE — PLAN OF CARE
Problem: Suicidal Behavior  Goal: Suicidal Behavior is Absent or Managed  Outcome: Improving     Problem: Behavioral Health Plan of Care  Goal: Optimized Coping Skills in Response to Life Stressors  Outcome: Declining   Pt. Tearful and pleasant, refused group. Feeling alone and wanted staff in her room. Had a shower with supervision C/O of chest pain warm pack and tylenol effective.  Anxiety 8 and depression 9,  reports seeing her  father in the corner of the her room,  endorses SIB with a plan to bite self PRN hydroxyzine effective.  contracts for safety.

## 2021-07-17 NOTE — PLAN OF CARE
"Pt calm but uncooperative with medications regimen. Pt refused all her morning medications. Stated, \"I won't take medications and I don't belong here.\" Pt re approached with several attempts but still refused. Pt rated lower back pain at 8/10 but declined interventions. Endorsed depression at 9/10, stated \"I lost my dad in February.\" Rated anxiety 10/10 but declined interventions. Pt engages with peers and been noticed to have lots of giggles! Pt refused to attend groups. Denies SI, HI, SIB. Endorsed visual hallucinations. Denies all other psychs symptoms. Contracts for safety.   "

## 2021-07-17 NOTE — PLAN OF CARE
Problem: Sleep Disturbance  Goal: Adequate Sleep/Rest  Outcome: Improving   On  Q 15 minutes safety rounds, patient was observed sleeping. Eyes closed, respiratory rate normal. No complaints or concerns from patient at this time. Pt slept  7  hours thus far, will continue to monitor.

## 2021-07-17 NOTE — PROGRESS NOTES
Pt declined to join group, but she did sit with peers on the outskirts of the group.  She was social with the group she was sitting with and with this writer as she is familiar from a previous admission.  Pt also participated in a couple of the exercises from [facility].       07/17/21 1150   Engagement   Intervention Group   Topic Detail Exercise dice and Social question dice for large motor morvement, coping, sharing thoughts/feelings, symptom management, expanding social skills, improving mood, healthy leisure and encouraging a meaningful daily routine   Attendance Did not attend

## 2021-07-18 PROCEDURE — 128N000001 HC R&B CD/MH ADULT

## 2021-07-18 PROCEDURE — 250N000013 HC RX MED GY IP 250 OP 250 PS 637: Performed by: NURSE PRACTITIONER

## 2021-07-18 PROCEDURE — 250N000013 HC RX MED GY IP 250 OP 250 PS 637

## 2021-07-18 PROCEDURE — 250N000013 HC RX MED GY IP 250 OP 250 PS 637: Performed by: PSYCHIATRY & NEUROLOGY

## 2021-07-18 PROCEDURE — 124N000001 HC R&B MH

## 2021-07-18 RX ADMIN — Medication 1 CAPSULE: at 20:15

## 2021-07-18 RX ADMIN — POLYETHYLENE GLYCOL 3350 17 G: 17 POWDER, FOR SOLUTION ORAL at 11:19

## 2021-07-18 RX ADMIN — Medication 1 CAPSULE: at 11:23

## 2021-07-18 RX ADMIN — DOXYCYCLINE 100 MG: 100 CAPSULE ORAL at 20:16

## 2021-07-18 RX ADMIN — HYDROCORTISONE: 1 OINTMENT TOPICAL at 21:12

## 2021-07-18 RX ADMIN — Medication 1000 UNITS: at 11:22

## 2021-07-18 RX ADMIN — TRAZODONE HYDROCHLORIDE 50 MG: 50 TABLET ORAL at 20:16

## 2021-07-18 RX ADMIN — CHLORHEXIDINE GLUCONATE 0.12% ORAL RINSE 15 ML: 1.2 LIQUID ORAL at 20:14

## 2021-07-18 RX ADMIN — CHLORHEXIDINE GLUCONATE 0.12% ORAL RINSE 15 ML: 1.2 LIQUID ORAL at 11:18

## 2021-07-18 RX ADMIN — IBUPROFEN 400 MG: 200 TABLET, FILM COATED ORAL at 11:41

## 2021-07-18 RX ADMIN — DOCUSATE SODIUM 100 MG: 100 CAPSULE, LIQUID FILLED ORAL at 20:15

## 2021-07-18 RX ADMIN — PANTOPRAZOLE SODIUM 40 MG: 40 TABLET, DELAYED RELEASE ORAL at 16:19

## 2021-07-18 RX ADMIN — ANTACID TABLETS 500 MG: 500 TABLET, CHEWABLE ORAL at 17:14

## 2021-07-18 RX ADMIN — DOXYCYCLINE 100 MG: 100 CAPSULE ORAL at 11:23

## 2021-07-18 RX ADMIN — LITHIUM CARBONATE 450 MG: 300 CAPSULE, GELATIN COATED ORAL at 20:15

## 2021-07-18 RX ADMIN — NALTREXONE HYDROCHLORIDE 50 MG: 50 TABLET, FILM COATED ORAL at 20:15

## 2021-07-18 RX ADMIN — VENLAFAXINE HYDROCHLORIDE 300 MG: 150 CAPSULE, EXTENDED RELEASE ORAL at 11:20

## 2021-07-18 RX ADMIN — DOCUSATE SODIUM 100 MG: 100 CAPSULE, LIQUID FILLED ORAL at 11:21

## 2021-07-18 RX ADMIN — NORGESTIMATE AND ETHINYL ESTRADIOL 1 TABLET: KIT at 11:26

## 2021-07-18 RX ADMIN — PANTOPRAZOLE SODIUM 40 MG: 40 TABLET, DELAYED RELEASE ORAL at 11:32

## 2021-07-18 RX ADMIN — HYDROCORTISONE: 1 OINTMENT TOPICAL at 11:43

## 2021-07-18 ASSESSMENT — ACTIVITIES OF DAILY LIVING (ADL)
ORAL_HYGIENE: INDEPENDENT;PROMPTS
HYGIENE/GROOMING: INDEPENDENT
ORAL_HYGIENE: INDEPENDENT
DRESS: INDEPENDENT
HYGIENE/GROOMING: SHOWER;WITH SUPERVISION
DRESS: SCRUBS (BEHAVIORAL HEALTH)

## 2021-07-18 NOTE — PROGRESS NOTES
Assumed care of patient at 1130 pm, in bed sleeping without any s/s distress.Breathing easy,regularnon-labored.Continue on suicide/SIB precaution. No behavioral issues noted.No signs of SI,HI,SIB or psychosis noted.15 minute safety checks maintained as per policy.Slept all shift.Safety maintained.Nursing will continue to monitor as per plan of care.

## 2021-07-18 NOTE — PLAN OF CARE
"  Problem: Behavioral Health Plan of Care  Goal: Plan of Care Review  Outcome: Improving  Flowsheets  Taken 7/18/2021 1441  Progress: improving  Taken 7/18/2021 1300  Plan of Care Reviewed With: patient  Patient Agreement with Plan of Care: other (see comments)  Consent Given to Review Plan with: (pt has an episodes of being non complaint) --  Goal: Adheres to Safety Considerations for Self and Others  Outcome: Improving  Intervention: Develop and Maintain Individualized Safety Plan  Recent Flowsheet Documentation  Taken 7/18/2021 1300 by Mercy Jones RN  Safety Measures:   environmental rounds completed   safety rounds completed     Problem: Suicidal Behavior  Goal: Suicidal Behavior is Absent or Managed  Outcome: Improving     Problem: Suicide Risk  Goal: Absence of Self-Harm  Outcome: Improving  Pt calm and cooperation with certain staffs. Pt refused her meds from the writer but when re approached by another nurse, pt took the meds. Pt out in milieu most of the shift and positively engages with peers. Denies SI and HI, pt endorsed SIB stating \"by biting my hand\". Pt contracts for safety. Rated chronic lower back pain at 9/10, Motrin was given. Unknown effectiveness. Pt sleeping at this time. Pt rated depression at 10/10 and anxiety 10/10. Declined PRN. Pt endorsed visual and auditory hallucinations, stated \"I see my dad sitting at the corner, he is saying get out, don't stay too long.\" Pt c/o of itching both arms, PRN hydrocortisone cream was applied.   "

## 2021-07-18 NOTE — PROGRESS NOTES
"Pt calm and cooperation with selected staff. Pt refused her meds from the writer but when re approached by another nurse, pt took the meds. Pt out in milieu most of the shift and positively engages with peers. Denies SI and HI, pt endorsed SIB stating \"by biting my hand\". Pt contracts for safety. Rated chronic lower back pain at 9/10, Motrin was given. Unknown effectiveness. Pt sleeping at this time. Pt rated depression at 10/10 and anxiety 10/10. Declined PRN. Pt endorsed visual and auditory hallucinations, stated \"I see my dad sitting at the corner, he is saying get out, don't stay too long.\" Pt c/o of itching both arms, PRN hydrocortisone cream was applied.   "

## 2021-07-18 NOTE — PLAN OF CARE
Problem: Behavioral Health Plan of Care  Goal: Plan of Care Review  Recent Flowsheet Documentation  Taken 2021 1609 by Felicitas Vicente RN  Plan of Care Reviewed With: patient  Patient Agreement with Plan of Care: refuses to participate  Goal: Develops/Participates in Therapeutic [place] to Support Successful Transition  Intervention: Foster Therapeutic [place]  Recent Flowsheet Documentation  Taken 2021 1700 by Felicitas Vicente, RN  Trust Relationship/Rapport:    emotional support provided    empathic listening provided    questions answered     Problem: Suicide Risk  Goal: Absence of Self-Harm  Intervention: Promote Psychosocial Wellbeing  Recent Flowsheet Documentation  Taken 2021 1700 by Felicitas Vicente RN  Family/Support System Care:    self-care encouraged    support provided     Problem: Behavioral Health Plan of Care  Goal: Develops/Participates in Therapeutic [place] to Support Successful Transition  Intervention: Foster Therapeutic [place]  Recent Flowsheet Documentation  Taken 2021 1700 by Felicitas Vciente RN  Trust Relationship/Rapport:    emotional support provided    empathic listening provided    questions answered     Problem: Suicide Risk  Goal: Absence of Self-Harm  Intervention: Promote Psychosocial Wellbeing  Recent Flowsheet Documentation  Taken 2021 1700 by Felicitas Vicente RN  Family/Support System Care:    self-care encouraged    support provider  Pt. cooperative and tearful, upset about not knowing where her sweater is. easily redirected with 1:1 care and PRN hydroxyzine , out for group engaged with staff and peers, laughing and telling jokes, med complainant, endorses anxiety 5, depression 5 rating 7/10 back pain refused interventions.  reports visual and auditory hallucinations for her  father. Contracts for safety

## 2021-07-19 ENCOUNTER — HOSPITAL ENCOUNTER (INPATIENT)
Facility: CLINIC | Age: 21
LOS: 3 days | Discharge: NURSING, REHAB OR LONG-TERM CARE | DRG: 885 | End: 2021-07-22
Attending: PSYCHIATRY & NEUROLOGY | Admitting: PSYCHIATRY & NEUROLOGY
Payer: MEDICARE

## 2021-07-19 VITALS
HEART RATE: 74 BPM | RESPIRATION RATE: 18 BRPM | HEIGHT: 63 IN | WEIGHT: 216.5 LBS | TEMPERATURE: 98.4 F | OXYGEN SATURATION: 100 % | BODY MASS INDEX: 38.36 KG/M2 | SYSTOLIC BLOOD PRESSURE: 133 MMHG | DIASTOLIC BLOOD PRESSURE: 74 MMHG

## 2021-07-19 DIAGNOSIS — F31.32 BIPOLAR AFFECTIVE DISORDER, CURRENTLY DEPRESSED, MODERATE (H): ICD-10-CM

## 2021-07-19 DIAGNOSIS — F60.3 BORDERLINE PERSONALITY DISORDER (H): Primary | ICD-10-CM

## 2021-07-19 LAB
GLUCOSE BLDC GLUCOMTR-MCNC: 93 MG/DL (ref 70–125)
LITHIUM SERPL-SCNC: 0.4 MMOL/L

## 2021-07-19 PROCEDURE — 80178 ASSAY OF LITHIUM: CPT | Performed by: PSYCHIATRY & NEUROLOGY

## 2021-07-19 PROCEDURE — 250N000013 HC RX MED GY IP 250 OP 250 PS 637: Performed by: PSYCHIATRY & NEUROLOGY

## 2021-07-19 PROCEDURE — 99239 HOSP IP/OBS DSCHRG MGMT >30: CPT | Performed by: PSYCHIATRY & NEUROLOGY

## 2021-07-19 PROCEDURE — 128N000001 HC R&B CD/MH ADULT

## 2021-07-19 PROCEDURE — 36415 COLL VENOUS BLD VENIPUNCTURE: CPT | Performed by: PSYCHIATRY & NEUROLOGY

## 2021-07-19 RX ORDER — NALTREXONE HYDROCHLORIDE 50 MG/1
50 TABLET, FILM COATED ORAL AT BEDTIME
Status: CANCELLED | OUTPATIENT
Start: 2021-07-19

## 2021-07-19 RX ORDER — LACTOBACILLUS RHAMNOSUS GG 10B CELL
1 CAPSULE ORAL 2 TIMES DAILY
Status: CANCELLED | OUTPATIENT
Start: 2021-07-19 | End: 2021-07-29

## 2021-07-19 RX ORDER — DIPHENHYDRAMINE HYDROCHLORIDE 50 MG/ML
50 INJECTION INTRAMUSCULAR; INTRAVENOUS EVERY 8 HOURS PRN
Status: DISCONTINUED | OUTPATIENT
Start: 2021-07-19 | End: 2021-07-22 | Stop reason: HOSPADM

## 2021-07-19 RX ORDER — TRAZODONE HYDROCHLORIDE 50 MG/1
50 TABLET, FILM COATED ORAL AT BEDTIME
Status: CANCELLED | OUTPATIENT
Start: 2021-07-19

## 2021-07-19 RX ORDER — HALOPERIDOL 5 MG/ML
5 INJECTION INTRAMUSCULAR EVERY 8 HOURS PRN
Status: CANCELLED | OUTPATIENT
Start: 2021-07-19

## 2021-07-19 RX ORDER — POLYETHYLENE GLYCOL 3350 17 G/17G
17 POWDER, FOR SOLUTION ORAL DAILY
Status: CANCELLED | OUTPATIENT
Start: 2021-07-20

## 2021-07-19 RX ORDER — NORGESTIMATE AND ETHINYL ESTRADIOL 0.25-0.035
1 KIT ORAL DAILY
Status: CANCELLED | OUTPATIENT
Start: 2021-07-20

## 2021-07-19 RX ORDER — NALTREXONE HYDROCHLORIDE 50 MG/1
50 TABLET, FILM COATED ORAL AT BEDTIME
Status: DISCONTINUED | OUTPATIENT
Start: 2021-07-19 | End: 2021-07-22 | Stop reason: HOSPADM

## 2021-07-19 RX ORDER — DIAPER,BRIEF,INFANT-TODD,DISP
EACH MISCELLANEOUS 2 TIMES DAILY PRN
Status: CANCELLED | OUTPATIENT
Start: 2021-07-19

## 2021-07-19 RX ORDER — CHLORHEXIDINE GLUCONATE ORAL RINSE 1.2 MG/ML
15 SOLUTION DENTAL 2 TIMES DAILY
Status: CANCELLED | OUTPATIENT
Start: 2021-07-19

## 2021-07-19 RX ORDER — DIPHENHYDRAMINE HCL 50 MG
50 CAPSULE ORAL EVERY 8 HOURS PRN
Status: CANCELLED | OUTPATIENT
Start: 2021-07-19

## 2021-07-19 RX ORDER — LORAZEPAM 2 MG/1
2 TABLET ORAL EVERY 8 HOURS PRN
Status: CANCELLED | OUTPATIENT
Start: 2021-07-19

## 2021-07-19 RX ORDER — CALCIUM CARBONATE 500 MG/1
500 TABLET, CHEWABLE ORAL 2 TIMES DAILY PRN
Status: CANCELLED | OUTPATIENT
Start: 2021-07-19

## 2021-07-19 RX ORDER — TRAZODONE HYDROCHLORIDE 50 MG/1
50 TABLET, FILM COATED ORAL
Status: DISCONTINUED | OUTPATIENT
Start: 2021-07-19 | End: 2021-07-22 | Stop reason: HOSPADM

## 2021-07-19 RX ORDER — VENLAFAXINE HYDROCHLORIDE 150 MG/1
300 CAPSULE, EXTENDED RELEASE ORAL DAILY
Status: DISCONTINUED | OUTPATIENT
Start: 2021-07-20 | End: 2021-07-22 | Stop reason: HOSPADM

## 2021-07-19 RX ORDER — NORGESTIMATE AND ETHINYL ESTRADIOL 0.25-0.035
1 KIT ORAL DAILY
Status: DISCONTINUED | OUTPATIENT
Start: 2021-07-20 | End: 2021-07-22 | Stop reason: HOSPADM

## 2021-07-19 RX ORDER — DOCUSATE SODIUM 100 MG/1
100 CAPSULE, LIQUID FILLED ORAL 2 TIMES DAILY
Status: CANCELLED | OUTPATIENT
Start: 2021-07-19

## 2021-07-19 RX ORDER — IBUPROFEN 200 MG
400 TABLET ORAL 3 TIMES DAILY PRN
Status: DISCONTINUED | OUTPATIENT
Start: 2021-07-19 | End: 2021-07-22 | Stop reason: HOSPADM

## 2021-07-19 RX ORDER — DOCUSATE SODIUM 100 MG/1
100 CAPSULE, LIQUID FILLED ORAL 2 TIMES DAILY
Status: DISCONTINUED | OUTPATIENT
Start: 2021-07-19 | End: 2021-07-22 | Stop reason: HOSPADM

## 2021-07-19 RX ORDER — LACTOBACILLUS RHAMNOSUS GG 10B CELL
1 CAPSULE ORAL 2 TIMES DAILY
Status: DISCONTINUED | OUTPATIENT
Start: 2021-07-19 | End: 2021-07-22 | Stop reason: HOSPADM

## 2021-07-19 RX ORDER — ACETAMINOPHEN 325 MG/1
650 TABLET ORAL EVERY 4 HOURS PRN
Status: CANCELLED | OUTPATIENT
Start: 2021-07-19

## 2021-07-19 RX ORDER — TRAZODONE HYDROCHLORIDE 50 MG/1
50 TABLET, FILM COATED ORAL AT BEDTIME
Status: DISCONTINUED | OUTPATIENT
Start: 2021-07-19 | End: 2021-07-22 | Stop reason: HOSPADM

## 2021-07-19 RX ORDER — HYDROXYZINE HYDROCHLORIDE 25 MG/1
50 TABLET, FILM COATED ORAL EVERY 4 HOURS PRN
Status: DISCONTINUED | OUTPATIENT
Start: 2021-07-19 | End: 2021-07-22 | Stop reason: HOSPADM

## 2021-07-19 RX ORDER — LORAZEPAM 2 MG/ML
2 INJECTION INTRAMUSCULAR EVERY 8 HOURS PRN
Status: DISCONTINUED | OUTPATIENT
Start: 2021-07-19 | End: 2021-07-22 | Stop reason: HOSPADM

## 2021-07-19 RX ORDER — LANOLIN ALCOHOL/MO/W.PET/CERES
3 CREAM (GRAM) TOPICAL
Status: CANCELLED | OUTPATIENT
Start: 2021-07-19

## 2021-07-19 RX ORDER — LORAZEPAM 2 MG/ML
2 INJECTION INTRAMUSCULAR EVERY 8 HOURS PRN
Status: CANCELLED | OUTPATIENT
Start: 2021-07-19

## 2021-07-19 RX ORDER — LORAZEPAM 2 MG/1
2 TABLET ORAL EVERY 8 HOURS PRN
Status: DISCONTINUED | OUTPATIENT
Start: 2021-07-19 | End: 2021-07-22 | Stop reason: HOSPADM

## 2021-07-19 RX ORDER — CHLORHEXIDINE GLUCONATE ORAL RINSE 1.2 MG/ML
15 SOLUTION DENTAL 2 TIMES DAILY
Status: DISCONTINUED | OUTPATIENT
Start: 2021-07-19 | End: 2021-07-22 | Stop reason: HOSPADM

## 2021-07-19 RX ORDER — PANTOPRAZOLE SODIUM 40 MG/1
40 TABLET, DELAYED RELEASE ORAL
Status: CANCELLED | OUTPATIENT
Start: 2021-07-19

## 2021-07-19 RX ORDER — TRAZODONE HYDROCHLORIDE 50 MG/1
50 TABLET, FILM COATED ORAL
Status: CANCELLED | OUTPATIENT
Start: 2021-07-19

## 2021-07-19 RX ORDER — POLYETHYLENE GLYCOL 3350 17 G/17G
17 POWDER, FOR SOLUTION ORAL DAILY
Status: DISCONTINUED | OUTPATIENT
Start: 2021-07-20 | End: 2021-07-22 | Stop reason: HOSPADM

## 2021-07-19 RX ORDER — HALOPERIDOL 5 MG/1
5 TABLET ORAL EVERY 8 HOURS PRN
Status: CANCELLED | OUTPATIENT
Start: 2021-07-19

## 2021-07-19 RX ORDER — HALOPERIDOL 5 MG/1
5 TABLET ORAL EVERY 8 HOURS PRN
Status: DISCONTINUED | OUTPATIENT
Start: 2021-07-19 | End: 2021-07-22 | Stop reason: HOSPADM

## 2021-07-19 RX ORDER — PANTOPRAZOLE SODIUM 40 MG/1
40 TABLET, DELAYED RELEASE ORAL
Status: DISCONTINUED | OUTPATIENT
Start: 2021-07-19 | End: 2021-07-22 | Stop reason: HOSPADM

## 2021-07-19 RX ORDER — DOXYCYCLINE 100 MG/1
100 CAPSULE ORAL EVERY 12 HOURS SCHEDULED
Status: DISPENSED | OUTPATIENT
Start: 2021-07-19 | End: 2021-07-22

## 2021-07-19 RX ORDER — HALOPERIDOL 5 MG/ML
5 INJECTION INTRAMUSCULAR EVERY 8 HOURS PRN
Status: DISCONTINUED | OUTPATIENT
Start: 2021-07-19 | End: 2021-07-22 | Stop reason: HOSPADM

## 2021-07-19 RX ORDER — DIAPER,BRIEF,INFANT-TODD,DISP
EACH MISCELLANEOUS 2 TIMES DAILY PRN
Status: DISCONTINUED | OUTPATIENT
Start: 2021-07-19 | End: 2021-07-22 | Stop reason: HOSPADM

## 2021-07-19 RX ORDER — DIPHENHYDRAMINE HCL 50 MG
50 CAPSULE ORAL EVERY 8 HOURS PRN
Status: DISCONTINUED | OUTPATIENT
Start: 2021-07-19 | End: 2021-07-22 | Stop reason: HOSPADM

## 2021-07-19 RX ORDER — POLYETHYLENE GLYCOL 3350 17 G/17G
17 POWDER, FOR SOLUTION ORAL DAILY PRN
Status: DISCONTINUED | OUTPATIENT
Start: 2021-07-19 | End: 2021-07-22 | Stop reason: HOSPADM

## 2021-07-19 RX ORDER — IBUPROFEN 200 MG
400 TABLET ORAL 3 TIMES DAILY PRN
Status: CANCELLED | OUTPATIENT
Start: 2021-07-19

## 2021-07-19 RX ORDER — CALCIUM CARBONATE 500 MG/1
500 TABLET, CHEWABLE ORAL 2 TIMES DAILY PRN
Status: DISCONTINUED | OUTPATIENT
Start: 2021-07-19 | End: 2021-07-22 | Stop reason: HOSPADM

## 2021-07-19 RX ORDER — VENLAFAXINE HYDROCHLORIDE 150 MG/1
300 CAPSULE, EXTENDED RELEASE ORAL DAILY
Status: CANCELLED | OUTPATIENT
Start: 2021-07-20

## 2021-07-19 RX ORDER — HYDROXYZINE HYDROCHLORIDE 50 MG/1
50 TABLET, FILM COATED ORAL EVERY 4 HOURS PRN
Status: CANCELLED | OUTPATIENT
Start: 2021-07-19

## 2021-07-19 RX ORDER — DIPHENHYDRAMINE HYDROCHLORIDE 50 MG/ML
50 INJECTION INTRAMUSCULAR; INTRAVENOUS EVERY 8 HOURS PRN
Status: CANCELLED | OUTPATIENT
Start: 2021-07-19

## 2021-07-19 RX ORDER — ACETAMINOPHEN 325 MG/1
650 TABLET ORAL EVERY 4 HOURS PRN
Status: DISCONTINUED | OUTPATIENT
Start: 2021-07-19 | End: 2021-07-22 | Stop reason: HOSPADM

## 2021-07-19 RX ORDER — POLYETHYLENE GLYCOL 3350 17 G/17G
17 POWDER, FOR SOLUTION ORAL DAILY PRN
Status: CANCELLED | OUTPATIENT
Start: 2021-07-19

## 2021-07-19 RX ORDER — LANOLIN ALCOHOL/MO/W.PET/CERES
3 CREAM (GRAM) TOPICAL
Status: DISCONTINUED | OUTPATIENT
Start: 2021-07-19 | End: 2021-07-22 | Stop reason: HOSPADM

## 2021-07-19 RX ORDER — DOXYCYCLINE 100 MG/1
100 CAPSULE ORAL EVERY 12 HOURS SCHEDULED
Status: CANCELLED | OUTPATIENT
Start: 2021-07-19 | End: 2021-07-22

## 2021-07-19 RX ADMIN — TRAZODONE HYDROCHLORIDE 50 MG: 50 TABLET ORAL at 20:29

## 2021-07-19 RX ADMIN — PANTOPRAZOLE SODIUM 40 MG: 40 TABLET, DELAYED RELEASE ORAL at 16:56

## 2021-07-19 RX ADMIN — HYDROXYZINE HYDROCHLORIDE 50 MG: 25 TABLET, FILM COATED ORAL at 19:49

## 2021-07-19 RX ADMIN — CHLORHEXIDINE GLUCONATE 0.12% ORAL RINSE 15 ML: 1.2 LIQUID ORAL at 20:29

## 2021-07-19 RX ADMIN — HYDROCORTISONE: 1 OINTMENT TOPICAL at 20:50

## 2021-07-19 RX ADMIN — LITHIUM CARBONATE 450 MG: 300 CAPSULE, GELATIN COATED ORAL at 20:29

## 2021-07-19 RX ADMIN — DOXYCYCLINE 100 MG: 100 CAPSULE ORAL at 15:09

## 2021-07-19 RX ADMIN — DOXYCYCLINE 100 MG: 100 CAPSULE ORAL at 20:29

## 2021-07-19 RX ADMIN — Medication 1 CAPSULE: at 21:23

## 2021-07-19 RX ADMIN — NALTREXONE HYDROCHLORIDE 50 MG: 50 TABLET, FILM COATED ORAL at 20:29

## 2021-07-19 RX ADMIN — DOCUSATE SODIUM 100 MG: 100 CAPSULE, LIQUID FILLED ORAL at 20:29

## 2021-07-19 RX ADMIN — IBUPROFEN 400 MG: 200 TABLET, FILM COATED ORAL at 20:28

## 2021-07-19 ASSESSMENT — ACTIVITIES OF DAILY LIVING (ADL)
LAUNDRY: WITH SUPERVISION
DRESS: SCRUBS (BEHAVIORAL HEALTH)
DRESS: INDEPENDENT
ORAL_HYGIENE: INDEPENDENT
HYGIENE/GROOMING: HANDWASHING
ORAL_HYGIENE: INDEPENDENT
HYGIENE/GROOMING: INDEPENDENT

## 2021-07-19 NOTE — PLAN OF CARE
"  Problem: Behavioral Health Plan of Care  Goal: Adheres to Safety Considerations for Self and Others  Outcome: Improving  Intervention: Develop and Maintain Individualized Safety Plan  Recent Flowsheet Documentation  Taken 7/18/2021 1800 by Felicitas Vicente, RN  Safety Measures: safety rounds completed     Problem: Behavioral Health Plan of Care  Goal: Adheres to Safety Considerations for Self and Others  Intervention: Develop and Maintain Individualized Safety Plan  Recent Flowsheet Documentation  Taken 7/18/2021 1800 by Felicitas Vicente, RN  Safety Measures: safety rounds completed     Problem: Behavioral Health Plan of Care  Goal: Optimized Coping Skills in Response to Life Stressors  Outcome: Improving   Pt calm and cooperation. Out in milieu positively engages with peers and staff. C/O of  lower back pain at 9/10, declined interventions.  Pt. C/O of itching on bilateral arms and back PRN hydrocortisone cream effective  Pt rated depression at 9, anxiety 9. Declined PRN. Pt endorsed visual and auditory hallucinations, stated \"I see my dad sitting in my room, contracts for safety.     "

## 2021-07-19 NOTE — PLAN OF CARE
Patient transferred to 5500 AB unit. OT goal has been reactivated. Patient's OT goal review due 7/20/21.    Vera Y Abbi, OTR

## 2021-07-19 NOTE — PLAN OF CARE
"  Problem: Behavioral Health Plan of Care  Goal: Plan of Care Review  Outcome: No Change  Flowsheets (Taken 7/19/2021 1100)  Plan of Care Reviewed With: patient  Patient Agreement with Plan of Care:   disagrees (describe)   refuses to participate  Goal: Patient-Specific Goal (Individualization)  Outcome: No Change     Problem: Suicide Risk  Goal: Absence of Self-Harm  Outcome: No Change  Pt angry, tearful and demonstrate hostile behavior but was re directed. Pt refused her morning meds. Endorsed visual and auditory hallucinations, stated \"I saw my fucking girlfriend with the gun want to shoot me, I said shoot me bitch.\" Rated pain at 9/10 but refused interventions. Rated depression at 10/10, anxiety 10/10 declined any interventions. Pt contracts for safety.   "

## 2021-07-19 NOTE — PROGRESS NOTES
Patient transferred to unit 5500 @1400 from unit 2800. Patient arrived crying about changing units. She was greeted by familiar peers on the unit. She calmed down after talking to peers and nurse. Patient was orientated to unit and shown her room. Patient had snack and attended OT group with encouragement. She interacted with select peers and was observed to be adjusting well to the unit.

## 2021-07-19 NOTE — PROGRESS NOTES
Assumed care of patient at 1130 pm, in bed sleeping without any s/s distress.Breathing easy,regularnon-labored.Continue on suicide/SIB precaution.No signs of SI,HI,SIB or psychosis noted.15 minute safety checks maintained as per policy.Slept all shift.Safety maintained.

## 2021-07-19 NOTE — PLAN OF CARE
"Assessment/Intervention/Current Symtoms and Care Coordination  Writer met with patient to discuss discharge plans. Patient stated that if she were to discharge on Tuesday \"I will kill myself.\" Writer asked why she was feeling this way. Patient stated  \"I will slash my throat, I do not feel like talking about why I feel this way.\" Writer was willing to process with unit psychiatrist. Patient was sima to contact for safety while in hospital. Patient was transferred to unit 5500       Discharge Plan or Goal: Return to group home with established supports (DBT Therapist, Individual Therapist and Psychiatry) referral to [facility] and increased staffing at skilled nursing.     Barriers to Discharge Symptom Stabilization, Medication Management, Care Coordination     Referral Status pending- pts has established supports and customized living     Pt contacts  [facility]       [facility]   [address], [place] [address] [phone]     [facility]      CADI [facility]    Jyoti King  [phone]     [facility],   [facility]  [address] [facility].     [facility]      Legal Status Voluntary      ZEESHAN Zapata [facility], 7/19/2021, 3:35 PM   "

## 2021-07-19 NOTE — DISCHARGE SUMMARY
PSYCHIATRY  DISCHARGE SUMMARY     DATE OF DISCHARGE   07/19/2021           DISCHARGE DIAGNOSIS   Bipolar affective disorder, currently depressed, moderate (H)    Patient Active Problem List   Diagnosis     MENTAL HEALTH     Suicidal ideation     Suicidal ideations     Intellectual disability     Bipolar affective disorder, currently depressed, moderate (H)     Borderline personality disorder (H)          REASON FOR ADMISSION   This is a 20 year old female with history of Bipolar affective disorder, currently depressed, moderate (H).    HISTORY OF PRESENT ILLNESS      Chart reviewed and patient seen.  Discussed with staff regarding patient's status and behavior.  When writer approached patient in the room she was laying down in bed with the sheet covering her face.  She did respond to my voice and did agree to sit up and talk to writer.  Of note she is currently on a one-to-one observation and her sitter was in the room when writer approached patient.  As hospitalization progressed patient became more responsive and engaged in interview.  She endorsed all of the symptoms that have been documented and reports that she was having abdominal pain.  Reports that her.  Started yesterday and writer asked her if this may be related to her menstruation.  Patient initially agreed but then later gave a different explanation.  She denied any current nausea or vomiting.  Patient also endorsing suicidal ideation with plan to cut herself.  However states that she will be safe here in the hospital.  Reports that she misses her dad who passed away in February of this year.  States that she sees him and wants to go to him.  Hence reports that she has not been eating for the last few days.  Writer wondered if her syncopal attack was due to dehydration and poor nourishment.  As interview progressed patient became somewhat brighter and by the end of the interview she was able to get out of the bed walk around and accepted juice from her  "one-to-one staff.  Patient stated that she has a rash on her back and agreed to meet with the hospitalist for this.  Patient admitted that she has stopped taking her medications states she feels that they do not work for her.  She did state however that she did receive Abilify Maintena 400 mg this month however this will have to be verified . In reviewing her medication list patient does appear to have a  polypharmacy in her regimen.             ------------According to ER Physician report:-----------     [facility] EMERGENCY DEPARTMENT ([facility])  7/10/21     History              Chief Complaint   Patient presents with     Abdominal Pain       Nausea and vomiting.  Pt was seen here yesterday when sent in from clinic.  Does not feel any better.      The history is provided by the patient and medical records.      Sadaf Ross is a 20 year old female with a medical history significant for bipolar 1 disorder, borderline personality disorder, depression, intellectual disability, and suicidal ideations who presents to the emergency department for evaluation of nausea and vomiting. She denies improvement in her symptoms from her visit yesterday.  She notes a syncopal episode at her group home today in which she collapsed onto a couch.  She felt cold and \"crummy\" immediately before this episode. She is unable to recall what happened after collapsing. Patient endorses nausea, 3 episodes of vomiting, abdominal pain, chest pain, and shortness of breath. She denies incontinence.      Per DEC , patient reports multiple stressors: her father  in February, the rest of her family (mother and two sisters) are avoiding her, and her girlfriend is moving out of the group home that she is staying at.  Patient states she has been biting herself as self injury, stating that it makes her feel calmer.  Patient endorses chronic suicidal ideation.  She says it is worse than usual and she doesn't feel safe being " "discharged     Per chart review, patient was evaluated at [facility] ED on 7/9/2021 following a pseudoseizure event at her [facility] clinic visit. Patient vomited at home 7 times and was unable to recall events at clinic. She endorsed chest pain and shortness of breath. She has not been taking her medications for the past two weeks as she does not think that they work. Patient was given Zofran and IV fluids. Labs were unremarkable. Negative UPT and UA.      CT Abdomen Pelvis w Contrast 7/9/2021  IMPRESSION:   1.  No acute findings in the abdomen and pelvis.  2.  Left ovarian 3.0 cm physiologic dominant follicle/simple cyst.  Pelvic ultrasound can be considered for confirmation.  3.  Possible patchy hepatic steatosis.  A 1.0 cm indeterminate  hypodense lesion in the liver could represent focal fat or a  hemangioma. Liver MRI on an outpatient basis could be considered for  better characterization.     XR Chest 2 VW 7/9/2021  IMPRESSION: Clear lungs. No pleural effusion or pneumothorax. The  cardiac and mediastinal silhouettes are normal.     US Pelvis Complete 7/9/2021  IMPRESSION:   1. Normal pelvic ultrasound. Dominant follicle left ovary.  2. No suggestion for torsion with arterial and venous waveforms identified within each ovary.         Emma Negron MD  [facility] EMERGENCY DEPARTMENT  7/10/2021     ----------------------ER Physician Note------------------     She had been here multiple hours awaiting dec assessment I was called into the room for a possible \"seizure\".  On arrival the patient was \"shaking\" but was awake and alert and answering questions and squeezing the nurses hand and complaining of abdominal pain.  At this point, patient had an extensive work-up without signs of pathology for this abdominal pain and I did wonder about the potential of a psychosomatic cause but with the report of a left ovarian cyst and now her report of worsening pain we did obtain a pelvic ultrasound " which revealed a dominant follicle but no signs of torsion or other acute pathology.  Patient was given Toradol and Zyprexa and was feeling much improved on reevaluation.  She was seen by the behavioral health 's who reported she did contract for safety and she was requesting to go back to her group home.  She was no longer feeling suicidal and not having any obvious signs of hallucination here.  She was resting quietly on my reevaluation and confirm that she would like to go home.  I am uncertain of the exact cause of the events of the previous day but do question whether she could have potentially had a similar episode at the clinic that I witnessed here. Also question a possible viral syndrome.  Do feel that she should follow closely with her primary care provider and she was given a 3-day course of ciprofloxacin for possible UTI and to return for any new or worsening concerns.  She voiced understanding and was comfortable with this plan.  The group home was also comfortable with this plan.  She was discharged to the group home in stable condition.     Day Hope MD            HOSPITAL COURSE   Admitted due to aforementioned presentation.  Education regarding diagnostic and treatment options with risks, benefits and alternatives and adequate verbalization of understanding.  Discussed reviewed in further detail, stressors and events leading to presentation.    Admitted on a voluntary status    Patient has been started on a multimodal therapy that included medications.  The only medication change that we had made was on her lithium that was increased to 450 mg at bedtime.  The rest of the medications were kept the same.  Patient has been on and off compliant with her medications.  Patient is being transferred today to the unit 5500 AB side as it seems that the milieu in the unit 2800 does not seem to be therapeutic for the patient.  Patient has started to engage in some inappropriate relationships  "with her peers and this is seem to be interfering with her discharge.  Initially patient was very focused on going back to her group home but now she is refusing to go to her group home stating that she is going to kill herself if we sent her there.  Patient also has become more symptomatic stating that she is having visual hallucinations of her  that and her girlfriend.  Patient also has been refusing to engage in any assessments with this writer.    I have discussed the patient with Dr. Cash and nurse manager Day.  Given the patient's behaviors and inability to engage in a therapeutic relationship with this writer we have decided that the best for the patient will be for her to move to the unit 5508 AB side.  I will also talk to Dr. Soares and Dr. Power as patient has requested to have a new provider.    I tried to meet with the patient today in order to discuss the treatment plan.  Patient only replied \"get the fuck out of my face\".       MENTAL STATUS EXAM   Vitals: /74 (BP Location: Right arm)   Pulse 74   Temp 98.4  F (36.9  C) (Oral)   Resp 18   Ht 1.6 m (5' 3\")   Wt 98.2 kg (216 lb 8 oz)   LMP  (LMP Unknown)   SpO2 100%   BMI 38.35 kg/m      Mental Status:  Appearance:  No apparent distress  Mood:  {Mood: Irritable   Affect: inappropriate and hostile was was congruent to speech  Suicidal Ideation: PRESENT / ABSENT: present According to the  patient is saying that if we sent her to the group home she is going to kill herself.  Homicidal Ideation: PRESENT / ABSENT: absent   Thought process: Refused the assessment   Thought content: significant for Visual hallucinations as per staff.   Fund of Knowledge: Below average  Attention/Concentration: Easily distracted  Language ability:  Intact  Memory: Refused the assessment  Insight:  limited.  Judgement: limited  Orientation: Yes, x4  Psychomotor Behavior: restless    Muscle Strength and Tone: MuscleStrength: Normal  Gait " and Station: Normal         DISCHARGE MEDICATIONS   Discharge Medication Options:   Current Discharge Medication List      CONTINUE these medications which have NOT CHANGED    Details   acetaminophen (TYLENOL) 650 MG CR tablet Take 650 mg by mouth every 6 hours as needed for mild pain or fever       alum & mag hydroxide-simethicone (MAALOX) 200-200-20 MG/5ML SUSP suspension Take 20 mLs by mouth daily as needed for indigestion       ARIPiprazole ER (ABILIFY MAINTENA) 400 MG extended release inj syringe Inject 400 mg into the muscle every 28 days On the 4th of each month      calcium carbonate (TUMS) 500 MG chewable tablet Take 1 chew tab by mouth 2 times daily as needed for heartburn       chlorhexidine (CHLORHEXIDINE) 0.12 % solution Swish and spit 15 mLs in mouth 2 times daily       docusate sodium (COLACE) 100 MG capsule Take 100 mg by mouth 2 times daily       hydrOXYzine (ATARAX) 25 MG tablet Take 50 mg by mouth every 8 hours as needed for itching or anxiety       ibuprofen (ADVIL/MOTRIN) 400 MG tablet Take 400 mg by mouth 3 times daily as needed for moderate pain      lithium (ESKALITH) 150 MG capsule Take 150 mg by mouth At Bedtime      naltrexone (DEPADE/REVIA) 50 MG tablet Take 50 mg by mouth At Bedtime      norgestimate-ethinyl estradiol (SPRINTEC 28) 0.25-35 MG-MCG tablet Take 1 tablet by mouth daily      omeprazole (PRILOSEC) 40 MG DR capsule Take 40 mg by mouth daily before breakfast      polyethylene glycol (MIRALAX) 17 g packet Take 1 packet by mouth daily      traZODone (DESYREL) 50 MG tablet Take 50 mg by mouth At Bedtime      venlafaxine (EFFEXOR-XR) 150 MG 24 hr capsule Take 2 capsules (300 mg) by mouth daily  Qty: 60 capsule, Refills: 1    Associated Diagnoses: Borderline personality disorder (H); Major depressive disorder, recurrent episode, in partial remission (H)      Vitamin D, Cholecalciferol, 25 MCG (1000 UT) TABS Take 1,000 Units by mouth daily          STOP taking these medications        ciprofloxacin (CIPRO) 500 MG tablet Comments:   Reason for Stopping:         ciprofloxacin (CIPRO) 500 MG tablet Comments:   Reason for Stopping:               Medication adherence issues: MS Med Adherence Y/N: Yes, Hospitalization  Medication side effects: MEDICATION SIDE EFFECTS: no side effects reported         DISCHARGE PLAN   1.  Education given regarding diagnostic and treatment options with risks, benefits and alternatives with adequate verbalization of understanding.  2.  Discharge to unit 5500 A B side.  Upon detailed review of risk factors, patient amenable for release.   3.  Continue aforementioned medications and associated medication changes with follow-up by outpatient mental health provider.  4.  Crisis management planning in place.    5.  Continue efforts for sobriety.    .  Nursing and  to review further discharge recommendations.     TOTAL TIME:  Greater than 30 minutes for discharge planning.    This note was created with help of Dragon dictation system. Grammatical / typing errors are not intentional.    Luzma Norris MD

## 2021-07-19 NOTE — PROGRESS NOTES
07/19/21 1600   Engagement   Intervention Group   Topic Detail OT: Sand Art for attention/concentration/planning, building sense of self-worth, coping with stress/sxs and improving mood   Attendance Attended     Pt worked with peer, selecting color. Pt did part of manual skill portion independently when peer had stepped away and was successful. May need support/encouragement to work independently. Pt shared her favorite pattern was cheetah.

## 2021-07-19 NOTE — PROGRESS NOTES
Pt transferred to unit 5500 at 1345 for continuation of care. Pt accompanied by security and staff. Pt was sent with her belongings.

## 2021-07-19 NOTE — PROGRESS NOTES
"Pt angry, tearful and demonstrate hostile behavior, swearing to staff but was able to re directed. Pt was re approached several times but refused to take all her morning meds. Endorsed visual and auditory hallucinations, stated \"I saw my fucking girlfriend with the gun want to shoot me, I said shoot me bitch.\" Rated pain at 9/10 but refused interventions. Rated depression at 10/10, anxiety 10/10 declined any interventions. Pt contracts for safety.   "

## 2021-07-19 NOTE — PROGRESS NOTES
07/19/21 1321   Engagement   Intervention Group   Topic Detail OT Wellness group-self cares and cognitive exercises for social engagement, symptom management, self cares, mental wellness and following directions   Attendance Did not attend   Reason for Not Attending   (did not respond during in person invite)

## 2021-07-20 LAB — SARS-COV-2 RNA RESP QL NAA+PROBE: NEGATIVE

## 2021-07-20 PROCEDURE — 99231 SBSQ HOSP IP/OBS SF/LOW 25: CPT | Performed by: INTERNAL MEDICINE

## 2021-07-20 PROCEDURE — 250N000013 HC RX MED GY IP 250 OP 250 PS 637: Performed by: PSYCHIATRY & NEUROLOGY

## 2021-07-20 PROCEDURE — 87635 SARS-COV-2 COVID-19 AMP PRB: CPT | Performed by: PSYCHIATRY & NEUROLOGY

## 2021-07-20 PROCEDURE — 128N000001 HC R&B CD/MH ADULT

## 2021-07-20 PROCEDURE — 99231 SBSQ HOSP IP/OBS SF/LOW 25: CPT | Performed by: PSYCHIATRY & NEUROLOGY

## 2021-07-20 RX ADMIN — DOXYCYCLINE 100 MG: 100 CAPSULE ORAL at 10:59

## 2021-07-20 RX ADMIN — PANTOPRAZOLE SODIUM 40 MG: 40 TABLET, DELAYED RELEASE ORAL at 17:01

## 2021-07-20 RX ADMIN — PANTOPRAZOLE SODIUM 40 MG: 40 TABLET, DELAYED RELEASE ORAL at 11:01

## 2021-07-20 RX ADMIN — NORGESTIMATE AND ETHINYL ESTRADIOL 1 TABLET: KIT at 10:02

## 2021-07-20 RX ADMIN — ANTACID TABLETS 500 MG: 500 TABLET, CHEWABLE ORAL at 12:29

## 2021-07-20 RX ADMIN — HYDROXYZINE HYDROCHLORIDE 50 MG: 25 TABLET, FILM COATED ORAL at 17:01

## 2021-07-20 RX ADMIN — CHLORHEXIDINE GLUCONATE 0.12% ORAL RINSE 15 ML: 1.2 LIQUID ORAL at 10:56

## 2021-07-20 RX ADMIN — DOCUSATE SODIUM 100 MG: 100 CAPSULE, LIQUID FILLED ORAL at 10:57

## 2021-07-20 RX ADMIN — IBUPROFEN 400 MG: 200 TABLET, FILM COATED ORAL at 17:01

## 2021-07-20 RX ADMIN — Medication 1 CAPSULE: at 12:29

## 2021-07-20 RX ADMIN — VENLAFAXINE HYDROCHLORIDE 300 MG: 150 CAPSULE, EXTENDED RELEASE ORAL at 10:58

## 2021-07-20 ASSESSMENT — ACTIVITIES OF DAILY LIVING (ADL)
HYGIENE/GROOMING: INDEPENDENT
ORAL_HYGIENE: INDEPENDENT
LAUNDRY: WITH SUPERVISION
DRESS: INDEPENDENT

## 2021-07-20 NOTE — PROGRESS NOTES
07/20/21 1500   Engagement   Intervention Group   Topic Detail OT: Human Trivia Pursuit for social skill building to support wellness   Attendance Attended     Pt came to group but declined to complete any group tasks. Pt accepted being included in guessing and participated intermittently.

## 2021-07-20 NOTE — PROGRESS NOTES
Discussed with patient.  She is eating meal but says the skin sore is improving on current antibiotics.  Will sign off.  Please call us if it does not resolve or starts to worsen.    Jose Guadalupe Wyatt MD

## 2021-07-20 NOTE — PLAN OF CARE
Goal review completed:       Problem: Relapse Prevention  Goal: Engage in OT Group  Description: Target: Patient will engage in at least 1 OT group daily to support recovery this reporting period.  Outcome: Improving    Patient was agreeable to meet for check-in. Pt shared she enjoys being on this unit due to it being quieter and people are more welcoming. Pt shared she will try to engage in more group. Pt shared she did attend a group on card making. Care plan goals have been reviewed. Patient has made progress towards goals; target will be adjusted. Continue to establish rapport and encourage group participation with focus on goal area for further progress. Continue to correspond with interdisciplinary team regarding ongoing treatment plan, potential changes in patient's status, and discharge planning.    Therapist: Cele Vera MA, OTR/L  7/20/2021

## 2021-07-20 NOTE — PLAN OF CARE
Problem: Sleep Disturbance (Psychotic Signs/Symptoms)  Goal: Improved Sleep (Psychotic Signs/Symptoms)  Outcome: Improving     Problem: Behavioral Health Plan of Care  Goal: Adheres to Safety Considerations for Self and Others  Outcome: Improving  Goal: Absence of New-Onset Illness or Injury  Outcome: Improving     Patient had unremarkable night. Patient remains on 1: 1 staff supervision for SI/SIB. Slept well for the most part without using a sleeping aid or requesting for pain or anxiety medications. All precautions observed with no violations. Patient was cooperative with safety checks with no untoward behaviors. Patient endorses no SI/HI, A/VH or SIB. No evidence of withdrawal or respiratory distress, neither were there symptoms of monae or psychosis reported or observed. Staff will continue to monitor and follow plan of gm Zaragoza DNP, RN, APRN, CNS, AGCNS-BC.

## 2021-07-20 NOTE — PLAN OF CARE
Problem: Suicidal Behavior  Goal: Suicidal Behavior is Absent or Managed  Outcome: No Change     Pt was visible on the unit socializing and engaging with peers. She was seen sometimes pacing hallway. Vitals stable. Anxiety 10 depression 10. Prn Atarax 50 mg given. Endorses SI with plan to bite herself. Endorses auditory and visual hallucination. Refused to contract for safety. Rated pain 10/10 and was given prn Ibuprofen. Pt c/o of having seizure at night and scared to be in her room by herself. Dr. Jo notified and a 1:1 sitter was ordered. She was cooperative and med compliant. Prn   Hydrocortisone oint 1% was applied on rash on her back and bilateral upper arms. No other concerns or behavior noted at this time. Will continue to monitor and will assist if need arise.

## 2021-07-20 NOTE — PROGRESS NOTES
"1910: Pt reported that she \" feels that she is going to have a seizure, I am shaky and dizzy\" pt advised to sit down and one staff stayed with her to observe her,Pt denied being a diabetic but stated \" diabetes runs in my father's side of the family\", Blood sugar check was 93 at 1934, pt stated that she barely ate her diinner, RN offered her a glass of orange juice and 2 salitine crackers, she ate 100%. Vital signs taken were T 98.5, Pulse 88, Resp 18, /67.. Pt reported feeling better after the juice.She stated \" I want a 1\" because I don't know when I am going to have a seizure, writer reported off to pt's primary RN , will cont to monitor.  "

## 2021-07-20 NOTE — PLAN OF CARE
Problem: Behavioral Health Plan of Care  Goal: Adheres to Safety Considerations for Self and Others  Outcome: Improving     Problem: Behavioral Health Plan of Care  Goal: Patient-Specific Goal (Individualization)  Outcome: Improving  Note: Shift Summery:  Patient makes frequently complains of feeling nausea Reported vomiting with no evidence of vomitus. Sent text message to provider to order antinausea.    Took most of her morning medication late per her requested. See MAR for detail on time medication served.    Patient's Stated Goal for Shift:  Attend group meetings    Goal Status: Attended group.

## 2021-07-20 NOTE — PROGRESS NOTES
"PSYCHIATRY  PROGRESS NOTE     DATE OF SERVICE   7/16/2021         CHIEF COMPLAINT   \" Get out of my room.\"       SUBJECTIVE/OBJECTIVE     Patient was transferred from 2800 because of increasing attachment to another patient on 2800 that was interfering with treatment and discharge. Patient is not happy with the change. She reports that she has suicidal thoughts. She denies intent to harm self on unit. She reports some depression. She does agree to return to group home when stable. She denies delusions or hallucinations and has no particular physical complaints. She is visible on unit and attends activities. She remains on 1:1 supervision. She has intermittently refused medications.     MEDICATIONS   Medications:  Scheduled Meds:    [START ON 8/1/2021] ARIPiprazole ER  400 mg Intramuscular Q28 Days     chlorhexidine  15 mL Swish & Spit BID     docusate sodium  100 mg Oral BID     doxycycline monohydrate  100 mg Oral Q12H DIVYA     lactobacillus rhamnosus (GG)  1 capsule Oral BID     lithium  450 mg Oral At Bedtime     naltrexone  50 mg Oral At Bedtime     norgestimate-ethinyl estradiol  1 tablet Oral Daily     pantoprazole  40 mg Oral BID AC     polyethylene glycol  17 g Oral Daily     traZODone  50 mg Oral At Bedtime     venlafaxine  300 mg Oral Daily     Continuous Infusions:  PRN Meds:.acetaminophen, calcium carbonate, haloperidol **AND** LORazepam **AND** diphenhydrAMINE, haloperidol lactate **AND** LORazepam **AND** diphenhydrAMINE, hydrocortisone, hydrOXYzine, ibuprofen, melatonin, polyethylene glycol, traZODone    Medication adherence issues: MS Med Adherence Y/N: Yes, Hospitalization  Medication side effects: MEDICATION SIDE EFFECTS: no side effects reported  Benefit: Yes / No: Yes       ROS   Patient complains of lower back pain.      Review of Systems is otherwise negative including HEENT, CV, Respiratory, GI, , Musculoskeletal, Neurologic, Dermatologic, Endocrine, Immunological, Constitutional systems   "     MENTAL STATUS EXAM   Vitals: /71   Pulse 88   Temp 98.1  F (36.7  C) (Oral)   Resp 18   LMP  (LMP Unknown)   SpO2 96%     Appearance:  No apparent distress  Mood: irritable, some dysphoria  Affect: mood congruent  Suicidal Ideation: persists but no plan or intent  Homicidal Ideation: denies  Thought process: [place]   Thought content: CAPRI preoccupations mostly about going home  Fund of Knowledge: Below average  Attention/Concentration: Limited  Language ability:  Intact  Memory:  Immediate recall intact, Short-term memory intact and Long-term memory intact  Insight:  limited.  Judgement: limited  Orientation: Yes, x4  Psychomotor Behavior: normal or unremarkable    Muscle Strength and Tone: MuscleStrength: Normal  Gait and Station: Normal       LABS   personally reviewed.     No results found for: PHENYTOIN, PHENOBARB, VALPROATE, CBMZ       DIAGNOSIS   Principal Problem:    Bipolar affective disorder, currently depressed, moderate (H)    Active Problem List:  Patient Active Problem List   Diagnosis     MENTAL HEALTH     Suicidal ideation     Suicidal ideations     Intellectual disability     Bipolar affective disorder, currently depressed, moderate (H)     Borderline personality disorder (H)          PLAN   1. Ongoing education given regarding diagnostic and treatment options with risks, benefits and alternatives and adequate verbalization of understanding.  2. Medications      lithium to 450 mg at bedtime, lithium level ordered for next Monday      Naltrexone 50 mg at bedtime      Trazodone 50 mg at bedtime      Effexor 300 mg daily      Abilify Maintena 400 mg IM due on August 1, 2021. According to our records last time she took this medication was on July 4, 2021.  3. Medical team currently following the patient.  4.  working on a safe discharge plan.      Risk Assessment: [facility] RISK ASSESSMENT: Patient on precautions    Coordination of Care:   Patient seen, chart reviewed, case  reviewed with  and with nursing.         Re-Certification I certify that the inpatient psychiatric facility services furnished since the previous certification were, and continue to be, medically necessary for, either, treatment which could reasonably be expected to improve the patient s condition or diagnostic study and that the hospital records indicate that the services furnished were, either, intensive treatment services, admission and related services necessary for diagnostic study, or equivalent services.     I certify that the patient continues to need, on a daily basis, active treatment furnished directly by or requiring the supervision of inpatient psychiatric facility personnel.   I estimate 7 days of hospitalization is necessary for proper treatment of the patient. My plans for post-hospital care for this patient are  group home     MD Pacheco Richardson MD

## 2021-07-20 NOTE — PLAN OF CARE
Assessment/Intervention/Current Symtoms and Care Coordination  [facility] introduced herself to patient who was playing cards with peers in group area. Patient appeared happy as evidenced by laughing with peers. While meeting with [facility] patient reported her depression was 10/10 and anxiety 10/10. Patient reported thoughts to bite herself but plans to remain around staff and peers. Initially patient reported eating breakfast to [facility] but later in conversation said she did not. Patient reports poor nutritional intake and possible seizures is why she is unable to discharge. [facility] explained she will be assisting patient with discharge and patient reported missing her group home.    [facility] spoke with Arlene at patient's group home who is ready for her to return when discharged. Arlene reported they are able to continue working with patient towards medication compliance if needed. Group home is unable to provide transportation but Arlene reports patient uses medical ride.    [facility] spoke with patient's  Jyoti who plans to visit patient today on the unit around 1:00pm.    Discharge Plan or Goal  Return to group home with established supports (DBT Therapist, Individual Therapist and Psychiatry) referral to [facility] and increased staffing at detention.    Barriers to Discharge   Symptom Stabilization, Medication Management, Care Coordination    Referral Status  Patient has established supports and customized living. Patient's supports listed below.    [facility]   [address], [place] [address] [phone]     [facility]      [facility]    Jyoti King  [phone]     Arlene,   [facility]  [address] [facility].     [facility]     Legal Status  Voluntary     Nany Muñoz MA [facility], 7/20/2021, 11:02 AM

## 2021-07-21 PROCEDURE — 99231 SBSQ HOSP IP/OBS SF/LOW 25: CPT | Performed by: PSYCHIATRY & NEUROLOGY

## 2021-07-21 PROCEDURE — 250N000013 HC RX MED GY IP 250 OP 250 PS 637: Performed by: PSYCHIATRY & NEUROLOGY

## 2021-07-21 PROCEDURE — 128N000001 HC R&B CD/MH ADULT

## 2021-07-21 RX ADMIN — CHLORHEXIDINE GLUCONATE 0.12% ORAL RINSE 15 ML: 1.2 LIQUID ORAL at 20:27

## 2021-07-21 RX ADMIN — PANTOPRAZOLE SODIUM 40 MG: 40 TABLET, DELAYED RELEASE ORAL at 16:19

## 2021-07-21 RX ADMIN — DOXYCYCLINE 100 MG: 100 CAPSULE ORAL at 20:27

## 2021-07-21 RX ADMIN — TRAZODONE HYDROCHLORIDE 50 MG: 50 TABLET ORAL at 20:27

## 2021-07-21 RX ADMIN — DOCUSATE SODIUM 100 MG: 100 CAPSULE, LIQUID FILLED ORAL at 20:27

## 2021-07-21 RX ADMIN — NALTREXONE HYDROCHLORIDE 50 MG: 50 TABLET, FILM COATED ORAL at 20:27

## 2021-07-21 RX ADMIN — LITHIUM CARBONATE 450 MG: 300 CAPSULE, GELATIN COATED ORAL at 20:30

## 2021-07-21 ASSESSMENT — ACTIVITIES OF DAILY LIVING (ADL)
LAUNDRY: WITH SUPERVISION
CONCENTRATING,_REMEMBERING_OR_MAKING_DECISIONS_DIFFICULTY: NO
DIFFICULTY_EATING/SWALLOWING: NO
DOING_ERRANDS_INDEPENDENTLY_DIFFICULTY: NO
FALL_HISTORY_WITHIN_LAST_SIX_MONTHS: NO
WEAR_GLASSES_OR_BLIND: NO
ORAL_HYGIENE: INDEPENDENT
TOILETING_ISSUES: NO
HEARING_DIFFICULTY_OR_DEAF: NO
WALKING_OR_CLIMBING_STAIRS_DIFFICULTY: NO
DRESSING/BATHING_DIFFICULTY: NO
PATIENT_/_FAMILY_COMMUNICATION_STYLE: SPOKEN LANGUAGE (ENGLISH OR BILINGUAL)
HYGIENE/GROOMING: INDEPENDENT
DIFFICULTY_COMMUNICATING: NO

## 2021-07-21 NOTE — PLAN OF CARE
Problem: Suicidal Behavior  Goal: Suicidal Behavior is Absent or Managed  Outcome: No Change     Problem: Behavior Regulation Impairment (Nonsuicidal Self-Injury)  Goal: Improved Behavior Regulation and Impulse Control (Nonsuicidal Self-Injury)  Outcome: Improving     Problem: Cognitive Impairment (Nonsuicidal Self-Injury)  Goal: Optimized Cognitive Function (Nonsuicidal Self-Injury)  Outcome: Improving  Flowsheets (Taken 7/20/2021 2040)  Mutually Determined Action Steps (Optimized Cognitive Function): verbalizes self-injury thoughts     Problem: Mood Impairment (Nonsuicidal Self-Injury)  Goal: Improved Mood Symptoms (Nonsuicidal Self-Injury)  Outcome: Improving  Flowsheets (Taken 7/20/2021 2040)  Mutually Determined Action Steps (Improved Mood Symptoms): participates in recreational activity     Patient continued on a 1:1 sitter for safety. She was out in the milieux socializing and engaging with peers. Vitals stable. Attended group meeting. Out for all meals and ate 50% for supper and 100% for HS snacks. Rated pain 9/10 on her lower back and she was given prn Ibuprofen 400 mg which was helpful. Rated anxiety 8/10 and depression 7/10. She was given prn Atarax 50 mg which was helpful. Endorses SI with plan to bite herself. Denied Hallucination/delusion. Contracted for safety. Refused HS medications.  No other concerns or behavior noted at this time. Will continue to monitor and will assist if need arise.
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
## 2021-07-21 NOTE — PROGRESS NOTES
07/21/21 1500   Engagement   Intervention Group   Topic Detail OT: Sleep hygiene education for coping with stress/symptoms and supporting wellness   Attendance Did not attend

## 2021-07-21 NOTE — PLAN OF CARE
Problem: Suicidal Behavior  Goal: Suicidal Behavior is Absent or Managed  Outcome: Improving     Problem: Sleep Disturbance (Psychotic Signs/Symptoms)  Goal: Improved Sleep (Psychotic Signs/Symptoms)  Outcome: Improving     1:1 continued per provider order d/t SI and SIB.    As of 0620, has slept ~ 7 hrs. Suicide and self-injurious precautions continued. Per 1:1 at bedside, pt has been sleeping uneventfully throughout shift.

## 2021-07-21 NOTE — PLAN OF CARE
Problem: Suicidal Behavior  Goal: Suicidal Behavior is Absent or Managed  7/21/2021 1222 by Kika Walker, RN  Outcome: No Change  7/21/2021 1154 by Kiak Walker, RN  Outcome: Improving  Patient reported hearing voices, telling her to  kill herself.   Pt stated, I want to kill myself, but don't have any plan on how. Will continue to monitor closely.     Problem: Mood Impairment (Psychotic Signs/Symptoms)  Goal: Improved Mood Symptoms (Psychotic Signs/Symptoms)  Outcome: No Change

## 2021-07-21 NOTE — CONSULTS
1:1 was discontinued already by provider, behavior plan not needed for this.  Patient has a significant history of attention seeking and manipulative behaviors.  Appropriate and effective to discontinue 1:1 at this time.

## 2021-07-21 NOTE — PLAN OF CARE
Assessment/Intervention/Current Symtoms and Care Coordination  [facility] met with patient for daily check-in. Patient was sitting in the group area socializing with peers when CTC approached. [facility] discussed with client the treatment team feels she is ready to return to her group home and her group home is ready for her to return. CTC asked patient how she felt about this and she reported missing her group home a little but reports she still does not always take her medication. CTC shared how patient's group home is aware of this and they are prepared to help her continue working towards medication compliance. Informed patient of potential discharge scheduled for tomorrow and [facility] will schedule therapy and psychiatry appointments for next week and patient was in agreement with this plan. Patient was cooperative with this coordination but also informed [facility] she is continuing to bite herself frequently. [facility] did not see any visible bite marks where patient pointed to on her arm and has no report from unit staff patient has been observed engaging in biting behavior. CTC asked what has been helpful to patient in the past to reduce biting herself and she reported being close by to staff and socializing. [facility] validated client engaging in this coping skill as she has frequently seen her in the group area of the unit. Patient denied any other questions or needing anything else from [facility] at this time.     [facility] scheduled appointments for aftercare therapy and medication management. [facility] spoke with patient's , Jyoti Montenegro, who reported visiting patient on the unit yesterday. Jyoti reported patient appears to be at her baseline and in agreement with plan to return to group home tomorrow.    [facility] spoke with , Arlene, to coordinate. Arlene will pick patient up tomorrow at 10:00AM to transport her back to the group home. [facility] provided a clinical update on patient.       Discharge Plan or Goal  Discharge back to group  home with established community supports.      Barriers to Discharge   Symptom stabilization, medication management, coordination of care      Referral Status  CTC scheduled appointments with patient's therapist and medication management. Patient will return to group home with established supports below.    [facility]       [facility]   [address] [place], [place], [place] [address] [phone]     [facility]      [facility]    Jyoti King  [phone]     Arlene,   [facility]  [address] [facility].     [facility]     Legal Status  Voluntary    Nany Muñoz MA [facility], 7/21/2021, 1:10 PM

## 2021-07-21 NOTE — PROGRESS NOTES
"PSYCHIATRY  PROGRESS NOTE     DATE OF SERVICE   7/16/2021         CHIEF COMPLAINT   \" Get out of my room.\"       SUBJECTIVE/OBJECTIVE     Patient was transferred from 2800 because of increasing attachment to another patient on 2800 that was interfering with treatment and discharge. Patient is not happy with the change. She reports some depression. She does agree to return to group home when stable. She denies delusions or hallucinations and has no particular physical complaints. She is visible on unit and attends activities. She reports suicidal thoughts persisting unchanged. She is visible on unit and interacts with peers. She is intermittently not compliant with medications. She has had minimal change since yesterday     MEDICATIONS   Medications:  Scheduled Meds:    [START ON 8/1/2021] ARIPiprazole ER  400 mg Intramuscular Q28 Days     chlorhexidine  15 mL Swish & Spit BID     docusate sodium  100 mg Oral BID     doxycycline monohydrate  100 mg Oral Q12H DIVYA     lactobacillus rhamnosus (GG)  1 capsule Oral BID     lithium  450 mg Oral At Bedtime     naltrexone  50 mg Oral At Bedtime     norgestimate-ethinyl estradiol  1 tablet Oral Daily     pantoprazole  40 mg Oral BID AC     polyethylene glycol  17 g Oral Daily     traZODone  50 mg Oral At Bedtime     venlafaxine  300 mg Oral Daily     Continuous Infusions:  PRN Meds:.acetaminophen, calcium carbonate, haloperidol **AND** LORazepam **AND** diphenhydrAMINE, haloperidol lactate **AND** LORazepam **AND** diphenhydrAMINE, hydrocortisone, hydrOXYzine, ibuprofen, melatonin, polyethylene glycol, traZODone    Medication adherence issues: MS Med Adherence Y/N: Yes, Hospitalization  Medication side effects: MEDICATION SIDE EFFECTS: no side effects reported  Benefit: Yes / No: Yes       ROS   Patient complains of lower back pain.    Review of Systems is otherwise negative including HEENT, CV, Respiratory, GI, , Musculoskeletal, Neurologic, Dermatologic, Endocrine, " Immunological, Constitutional systems       MENTAL STATUS EXAM   Vitals: /66   Pulse 77   Temp 98.3  F (36.8  C) (Oral)   Resp 18   Wt 99.1 kg (218 lb 6.4 oz)   LMP  (LMP Unknown)   SpO2 97%   BMI 38.69 kg/m      Appearance:  No apparent distress  Mood: irritable, some dysphoria  Affect: mood congruent  Suicidal Ideation: persists unchanged  Homicidal Ideation: denies  Thought process: [place]   Thought content: CAPRI preoccupations mostly about going home  Fund of Knowledge: Below average  Attention/Concentration: Limited  Language ability:  Intact  Memory:  Immediate recall intact, Short-term memory intact and Long-term memory intact  Insight:  limited.  Judgement: limited  Orientation: Yes, x4  Psychomotor Behavior: normal or unremarkable    Muscle Strength and Tone: MuscleStrength: Normal  Gait and Station: Normal       LABS   personally reviewed.     No results found for: PHENYTOIN, PHENOBARB, VALPROATE, CBMZ       DIAGNOSIS   Principal Problem:    Bipolar affective disorder, currently depressed, moderate (H)    Active Problem List:  Patient Active Problem List   Diagnosis     MENTAL HEALTH     Suicidal ideation     Suicidal ideations     Intellectual disability     Bipolar affective disorder, currently depressed, moderate (H)     Borderline personality disorder (H)          PLAN   1. Ongoing education given regarding diagnostic and treatment options with risks, benefits and alternatives and adequate verbalization of understanding.  2. Medications      lithium to 450 mg at bedtime, lithium level ordered for next Monday      Naltrexone 50 mg at bedtime      Trazodone 50 mg at bedtime      Effexor 300 mg daily      Abilify Maintena 400 mg IM due on August 1, 2021. According to our records last time she took this medication was on July 4, 2021.  3. Medical team currently following the patient.  4.  working on a safe discharge plan.  5. Will discontinue 1:1 as this appears to be  counter-productive and creates more dependence    Risk Assessment: [facility] RISK ASSESSMENT: Patient on precautions    Coordination of Care:     Patient seen, chart reviewed, case reviewed with  and with nursing.       Re-Certification I certify that the inpatient psychiatric facility services furnished since the previous certification were, and continue to be, medically necessary for, either, treatment which could reasonably be expected to improve the patient s condition or diagnostic study and that the hospital records indicate that the services furnished were, either, intensive treatment services, admission and related services necessary for diagnostic study, or equivalent services.     I certify that the patient continues to need, on a daily basis, active treatment furnished directly by or requiring the supervision of inpatient psychiatric facility personnel.   I estimate 7 days of hospitalization is necessary for proper treatment of the patient. My plans for post-hospital care for this patient are  group home     MD Pacheco Richardson MD

## 2021-07-22 VITALS
WEIGHT: 218.4 LBS | SYSTOLIC BLOOD PRESSURE: 119 MMHG | DIASTOLIC BLOOD PRESSURE: 70 MMHG | RESPIRATION RATE: 18 BRPM | TEMPERATURE: 97.6 F | OXYGEN SATURATION: 97 % | HEART RATE: 92 BPM | BODY MASS INDEX: 38.69 KG/M2

## 2021-07-22 PROCEDURE — 250N000013 HC RX MED GY IP 250 OP 250 PS 637: Performed by: PSYCHIATRY & NEUROLOGY

## 2021-07-22 PROCEDURE — 99239 HOSP IP/OBS DSCHRG MGMT >30: CPT | Performed by: PSYCHIATRY & NEUROLOGY

## 2021-07-22 RX ORDER — LITHIUM CARBONATE 150 MG/1
450 CAPSULE ORAL AT BEDTIME
Qty: 90 CAPSULE | Refills: 0 | Status: SHIPPED | OUTPATIENT
Start: 2021-07-22 | End: 2021-12-13

## 2021-07-22 RX ADMIN — PANTOPRAZOLE SODIUM 40 MG: 40 TABLET, DELAYED RELEASE ORAL at 08:29

## 2021-07-22 RX ADMIN — CHLORHEXIDINE GLUCONATE 0.12% ORAL RINSE 15 ML: 1.2 LIQUID ORAL at 08:29

## 2021-07-22 RX ADMIN — VENLAFAXINE HYDROCHLORIDE 300 MG: 150 CAPSULE, EXTENDED RELEASE ORAL at 08:29

## 2021-07-22 RX ADMIN — NORGESTIMATE AND ETHINYL ESTRADIOL 1 TABLET: KIT at 08:28

## 2021-07-22 RX ADMIN — DOCUSATE SODIUM 100 MG: 100 CAPSULE, LIQUID FILLED ORAL at 08:29

## 2021-07-22 ASSESSMENT — ACTIVITIES OF DAILY LIVING (ADL)
ORAL_HYGIENE: INDEPENDENT
DRESS: INDEPENDENT
HYGIENE/GROOMING: INDEPENDENT

## 2021-07-22 NOTE — PLAN OF CARE
Problem: Behavior Management  Goal: Effective Behavior Management  Outcome: Improving     Problem: Suicidal Behavior  Goal: Suicidal Behavior is Absent or Managed  Outcome: Improving     Problem: Behavior Regulation Impairment (Psychotic Signs/Symptoms)  Goal: Improved Behavioral Control (Psychotic Signs/Symptoms)  Outcome: Improving     Problem: Decreased Participation and Engagement (Psychotic Signs/Symptoms)  Goal: Increased Participation and Engagement (Psychotic Signs/Symptoms)  Outcome: Improving   Pt was observed sleeping comfortably during all safety checks. No signs of pain or discomfort noted. Will continue with same plan of care.
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
## 2021-07-22 NOTE — DISCHARGE INSTRUCTIONS
Behavioral Discharge Planning and Instructions    Summary: You were admitted on 7/19/2021  due to Suicidal Ideations and Self Injurious Behaviors.  You were treated by Dr Soares and discharged on 7/21/2021 from [facility] to [facility].    Main Diagnosis: Bipolar affective disorder    Health Care Follow-up:   Appointment Type: Medication management  Provider: Princess Victor Manuel DNP CNP  Date & Time: Monday, July 26th at 9:00AM  Clinic: [facility]  Address: [address] [place] [address], [address], [place] [id number]  Phone: [phone]  Additional Notes: This is an in-person appointment. If you need to cancel, reschedule, or have any questions about this appointment please call [phone].    Appointment Type: Psychotherapy  Provider: Shannen Martin  Date & Time: Tuesday, July 27th at 10:00AM      Medical Appointment: Appointment with  on July 26th at 3:20.   Address: [address] [place], [place] [id number] [facility]   Phone number: [phone]  Please bring your discharge paper work from your hospital stay with you to your medical appointment.   Please bring your ID, insurance card, copayment (if any) and list of medications. If you need to cancel or reschedule, please call the clinic.    Clinic: [facility]  Address: [address] [place] [address], [address], [place] [address]  Phone: [phone]  Additional Notes: This is an in-person appointment. If you need to cancel, reschedule, or have any questions about this appointment please call [phone].    Other support contacts to follow up with as needed:  [facility] - Jyoti Montenegro [phone]    [facility] [phone]     Carlos at [facility] [phone]    Lien at [facility] [phone]      Attend all scheduled appointments with your outpatient providers. Call at least 24 hours in advance if you need to reschedule an appointment to ensure continued access  to your outpatient providers.     Major Treatments, Procedures and Findings:  You were provided with: a psychiatric assessment, assessed for medical stability, group therapy and milieu management    Symptoms to Report: mood getting worse or thoughts of suicide    Early warning signs can include: increased depression or anxiety increased thoughts or behaviors of suicide or self-harm     Safety and Wellness:  Take all medicines as directed.  Make no changes unless your doctor suggests them.      Follow treatment recommendations.  Refrain from alcohol and non-prescribed drugs.  Ask your support system to help you reduce your access to items that could harm yourself or others. If there is a concern for safety, call 911.    Resources:   [facility] Crisis (COPE) Response - Adult [phone]    General Medication Instructions:   See your medication sheet(s) for instructions.   Take all medicines as directed.  Make no changes unless your doctor suggests them.   Go to all your doctor visits.  Be sure to have all your required lab tests. This way, your medicines can be refilled on time.  Do not use any drugs not prescribed by your doctor.  Avoid alcohol.    The Treatment team has appreciated the opportunity to work with you. If you have any questions or concerns about your recent admission, you can contact the unit which can receive your call 24 hours a day, 7 days a week. They will be able to get in touch with a Provider if needed. The unit number is [phone] .

## 2021-07-22 NOTE — DISCHARGE SUMMARY
PSYCHIATRY  DISCHARGE SUMMARY     DATE OF SERVICE   7/16/2021         CHIEF COMPLAINT   Patient admitted due to concerns about suicidal thoughts and thoughts of self harm           Hospital Course:     Patient was admitted and observed. She was continued on her home medication regimen, but Lithium was increased to 450 mg a day. She was not consistently compliant so level was not checked. Throughout the hospitalization she showed improved mood and ability to engage with peers. She was transferred from 2800 to 5500 due to concerns that she was getting overly attached to a peer there. She was angry about this.     By day of discharge the patient still voiced thoughts of self harm but this was noted to be baseline for her. The case was discussed with team and with  and it was decided that extended hospitalization was counter productive. Discharge was arranged to group home with follow up next week    SUBJECTIVE/OBJECTIVE     Patient was transferred from 2800 because of increasing attachment to another patient on 2800 that was interfering with treatment and discharge. Patient is not happy with the change. She reports some depression. She does agree to return to group home when stable. She denies delusions or hallucinations and has no particular physical complaints. She is visible on unit and attends activities. She reports suicidal thoughts persisting unchanged. She is visible on unit and interacts with peers. She is intermittently not compliant with medications. She has had minimal change since yesterday      Patient was discussed in treatment team:    According to Nursing: Patient is somewhat angry about anticipated discharge and was placed on 1:1.    According to OT:    According to :Anticipate discharge today       MEDICATIONS   Medications:  Scheduled Meds:    [START ON 8/1/2021] ARIPiprazole ER  400 mg Intramuscular Q28 Days     chlorhexidine  15 mL Swish & Spit BID     docusate  sodium  100 mg Oral BID     lactobacillus rhamnosus (GG)  1 capsule Oral BID     lithium  450 mg Oral At Bedtime     naltrexone  50 mg Oral At Bedtime     norgestimate-ethinyl estradiol  1 tablet Oral Daily     pantoprazole  40 mg Oral BID AC     polyethylene glycol  17 g Oral Daily     traZODone  50 mg Oral At Bedtime     venlafaxine  300 mg Oral Daily     Continuous Infusions:  PRN Meds:.acetaminophen, calcium carbonate, haloperidol **AND** LORazepam **AND** diphenhydrAMINE, haloperidol lactate **AND** LORazepam **AND** diphenhydrAMINE, hydrocortisone, hydrOXYzine, ibuprofen, melatonin, polyethylene glycol, traZODone    Medication adherence issues: MS Med Adherence Y/N: Yes, Hospitalization  Medication side effects: MEDICATION SIDE EFFECTS: no side effects reported  Benefit: Yes / No: Yes       ROS   Patient complains of lower back pain.    Review of Systems is otherwise negative including HEENT, CV, Respiratory, GI, , Musculoskeletal, Neurologic, Dermatologic, Endocrine, Immunological, Constitutional systems       MENTAL STATUS EXAM   Vitals: /66   Pulse 77   Temp 98.3  F (36.8  C) (Oral)   Resp 18   Wt 99.1 kg (218 lb 6.4 oz)   LMP  (LMP Unknown)   SpO2 97%   BMI 38.69 kg/m      Appearance:  No apparent distress  Mood: irritable, some dysphoria  Affect: mood congruent  Suicidal Ideation: persists unchanged  Homicidal Ideation: denies  Thought process: [place]   Thought content: CAPRI preoccupations mostly about going home  Fund of Knowledge: Below average  Attention/Concentration: Limited  Language ability:  Intact  Memory:  Immediate recall intact, Short-term memory intact and Long-term memory intact  Insight:  limited.  Judgement: limited  Orientation: Yes, x4  Psychomotor Behavior: normal or unremarkable    Muscle Strength and Tone: MuscleStrength: Normal  Gait and Station: Normal  Minimal change in mental status in the past 24 hours       LABS   personally reviewed.     No results found for:  PHENYTOIN, PHENOBARB, VALPROATE, CBMZ       DIAGNOSIS   Principal Problem:    Bipolar affective disorder, currently depressed, moderate (H)    Active Problem List:  Patient Active Problem List   Diagnosis     MENTAL HEALTH     Suicidal ideation     Suicidal ideations     Intellectual disability     Bipolar affective disorder, currently depressed, moderate (H)     Borderline personality disorder (H)          PLAN   Discharge today    Meds:      [START ON 8/1/2021] ARIPiprazole ER  400 mg Intramuscular Q28 Days     chlorhexidine  15 mL Swish & Spit BID     docusate sodium  100 mg Oral BID     lactobacillus rhamnosus (GG)  1 capsule Oral BID     lithium  450 mg Oral At Bedtime     naltrexone  50 mg Oral At Bedtime     norgestimate-ethinyl estradiol  1 tablet Oral Daily     pantoprazole  40 mg Oral BID AC     polyethylene glycol  17 g Oral Daily     traZODone  50 mg Oral At Bedtime     venlafaxine  300 mg Oral Daily     More than 35 minutes spent on this visit with more than 50% time spent on coordination of care with staff, reviewing medical record, psychoeducation, providing supportive therapy regarding coping with chronic mental illness, entering orders and preparing documentation for the visit      Pacheco Soares MD

## 2021-07-22 NOTE — PLAN OF CARE
BEHAVIORAL TEAM DISCUSSION    Participants: Ernestina CHURCH RN, Dr Verdin- Psychology, Gela S- OT, Tammy M- Pharm, Dr Soares- MD, Nany SINGH  Progress: Adequate for Discharge  Anticipated length of stay: 7/22/2021  Continued Stay Criteria/Rationale: Discharge today to group home with established supports  Medical/Physical: None reported  Precautions:   Behavioral Orders   Procedures    1:1 Precautions     RE: Safety    Self Injury Precaution    Suicide precautions     Patients on Suicide Precautions should have a Combination Diet ordered that includes a Diet selection(s) AND a Behavioral Tray selection for Safe Tray - with utensils, or Safe Tray - NO utensils.  Cutover every 8 hours     Plan: Discharge to group home today with established community supports  Rationale for change in precautions or plan: No change

## 2021-07-23 ENCOUNTER — HOSPITAL ENCOUNTER (EMERGENCY)
Facility: CLINIC | Age: 21
Discharge: HOME | End: 2021-07-24
Attending: EMERGENCY MEDICINE
Payer: MEDICARE

## 2021-07-23 ENCOUNTER — NURSE TRIAGE (OUTPATIENT)
Dept: NURSING | Facility: CLINIC | Age: 21
End: 2021-07-23

## 2021-07-23 DIAGNOSIS — R45.851 SUICIDAL THOUGHTS: ICD-10-CM

## 2021-07-23 PROCEDURE — 250N000013 HC RX MED GY IP 250 OP 250 PS 637: Performed by: EMERGENCY MEDICINE

## 2021-07-23 PROCEDURE — 99285 EMERGENCY DEPT VISIT HI MDM: CPT | Mod: 25

## 2021-07-23 PROCEDURE — 250N000009 HC RX 250: Performed by: EMERGENCY MEDICINE

## 2021-07-23 PROCEDURE — 93005 ELECTROCARDIOGRAM TRACING: CPT

## 2021-07-23 RX ORDER — LIDOCAINE HYDROCHLORIDE 20 MG/ML
10 SOLUTION OROPHARYNGEAL ONCE
Status: COMPLETED | OUTPATIENT
Start: 2021-07-23 | End: 2021-07-23

## 2021-07-23 RX ORDER — MAGNESIUM HYDROXIDE/ALUMINUM HYDROXICE/SIMETHICONE 120; 1200; 1200 MG/30ML; MG/30ML; MG/30ML
30 SUSPENSION ORAL ONCE
Status: COMPLETED | OUTPATIENT
Start: 2021-07-23 | End: 2021-07-23

## 2021-07-23 RX ADMIN — ALUMINUM HYDROXIDE, MAGNESIUM HYDROXIDE, AND SIMETHICONE 30 ML: 200; 200; 20 SUSPENSION ORAL at 23:53

## 2021-07-23 RX ADMIN — LIDOCAINE HYDROCHLORIDE 10 ML: 20 SOLUTION ORAL; TOPICAL at 23:53

## 2021-07-23 ASSESSMENT — MIFFLIN-ST. JEOR: SCORE: 1738.84

## 2021-07-23 NOTE — PLAN OF CARE
Occupational Therapy Discharge Summary      Patient Name: Sadaf Ross    Date of OT Discharge: 7/22/2021      Problem: Relapse Prevention  Goal: Engage in OT Group  Description: Engage in at least 3 OT groups within review period.  Outcome: Completed       Discontinue OT services. Refer to flowsheet and notes for progress towards goals.    Discharge Comments: discharging to group home    Therapist: Cele Vera MA, OTR/L  Date:7/23/2021

## 2021-07-24 ENCOUNTER — APPOINTMENT (OUTPATIENT)
Dept: GENERAL RADIOLOGY | Facility: CLINIC | Age: 21
End: 2021-07-24
Attending: EMERGENCY MEDICINE
Payer: MEDICARE

## 2021-07-24 ENCOUNTER — HOSPITAL ENCOUNTER (EMERGENCY)
Facility: CLINIC | Age: 21
Discharge: HOME | End: 2021-07-24
Attending: EMERGENCY MEDICINE | Admitting: EMERGENCY MEDICINE
Payer: MEDICARE

## 2021-07-24 ENCOUNTER — NURSE TRIAGE (OUTPATIENT)
Dept: NURSING | Facility: CLINIC | Age: 21
End: 2021-07-24

## 2021-07-24 VITALS
HEIGHT: 64 IN | WEIGHT: 218 LBS | RESPIRATION RATE: 16 BRPM | SYSTOLIC BLOOD PRESSURE: 119 MMHG | OXYGEN SATURATION: 97 % | DIASTOLIC BLOOD PRESSURE: 64 MMHG | TEMPERATURE: 98.5 F | HEART RATE: 61 BPM | BODY MASS INDEX: 37.22 KG/M2

## 2021-07-24 DIAGNOSIS — K21.00 GASTROESOPHAGEAL REFLUX DISEASE WITH ESOPHAGITIS WITHOUT HEMORRHAGE: ICD-10-CM

## 2021-07-24 LAB
ALBUMIN SERPL-MCNC: 3.8 G/DL (ref 3.4–5)
ALP SERPL-CCNC: 67 U/L (ref 40–150)
ALT SERPL W P-5'-P-CCNC: 179 U/L (ref 0–50)
AMPHETAMINES UR QL SCN: NORMAL
ANION GAP SERPL CALCULATED.3IONS-SCNC: 5 MMOL/L (ref 3–14)
AST SERPL W P-5'-P-CCNC: 92 U/L (ref 0–45)
BARBITURATES UR QL: NORMAL
BASOPHILS # BLD AUTO: 0.1 10E3/UL (ref 0–0.2)
BASOPHILS NFR BLD AUTO: 1 %
BENZODIAZ UR QL: NORMAL
BILIRUB SERPL-MCNC: 0.3 MG/DL (ref 0.2–1.3)
BUN SERPL-MCNC: 13 MG/DL (ref 7–30)
CALCIUM SERPL-MCNC: 8.8 MG/DL (ref 8.5–10.1)
CANNABINOIDS UR QL SCN: NORMAL
CHLORIDE BLD-SCNC: 108 MMOL/L (ref 94–109)
CO2 SERPL-SCNC: 25 MMOL/L (ref 20–32)
COCAINE UR QL: NORMAL
CREAT SERPL-MCNC: 0.87 MG/DL (ref 0.52–1.04)
EOSINOPHIL # BLD AUTO: 0.2 10E3/UL (ref 0–0.7)
EOSINOPHIL NFR BLD AUTO: 2 %
ERYTHROCYTE [DISTWIDTH] IN BLOOD BY AUTOMATED COUNT: 12.8 % (ref 10–15)
GFR SERPL CREATININE-BSD FRML MDRD: >90 ML/MIN/1.73M2
GLUCOSE BLD-MCNC: 94 MG/DL (ref 70–99)
GLUCOSE BLDC GLUCOMTR-MCNC: 79 MG/DL (ref 70–99)
HCT VFR BLD AUTO: 33.8 % (ref 35–47)
HGB BLD-MCNC: 11.4 G/DL (ref 11.7–15.7)
IMM GRANULOCYTES # BLD: 0 10E3/UL
IMM GRANULOCYTES NFR BLD: 0 %
LYMPHOCYTES # BLD AUTO: 3.1 10E3/UL (ref 0.8–5.3)
LYMPHOCYTES NFR BLD AUTO: 30 %
MCH RBC QN AUTO: 28.9 PG (ref 26.5–33)
MCHC RBC AUTO-ENTMCNC: 33.7 G/DL (ref 31.5–36.5)
MCV RBC AUTO: 86 FL (ref 78–100)
MONOCYTES # BLD AUTO: 0.5 10E3/UL (ref 0–1.3)
MONOCYTES NFR BLD AUTO: 5 %
NEUTROPHILS # BLD AUTO: 6.4 10E3/UL (ref 1.6–8.3)
NEUTROPHILS NFR BLD AUTO: 62 %
NRBC # BLD AUTO: 0 10E3/UL
NRBC BLD AUTO-RTO: 0 /100
OPIATES UR QL SCN: NORMAL
PLATELET # BLD AUTO: 252 10E3/UL (ref 150–450)
POTASSIUM BLD-SCNC: 3.8 MMOL/L (ref 3.4–5.3)
PROT SERPL-MCNC: 7.4 G/DL (ref 6.8–8.8)
RBC # BLD AUTO: 3.95 10E6/UL (ref 3.8–5.2)
SODIUM SERPL-SCNC: 138 MMOL/L (ref 133–144)
TROPONIN I SERPL-MCNC: <0.015 UG/L (ref 0–0.04)
WBC # BLD AUTO: 10.4 10E3/UL (ref 4–11)

## 2021-07-24 PROCEDURE — 80307 DRUG TEST PRSMV CHEM ANLYZR: CPT | Performed by: EMERGENCY MEDICINE

## 2021-07-24 PROCEDURE — 84484 ASSAY OF TROPONIN QUANT: CPT | Performed by: EMERGENCY MEDICINE

## 2021-07-24 PROCEDURE — 99284 EMERGENCY DEPT VISIT MOD MDM: CPT | Performed by: EMERGENCY MEDICINE

## 2021-07-24 PROCEDURE — 82040 ASSAY OF SERUM ALBUMIN: CPT | Performed by: EMERGENCY MEDICINE

## 2021-07-24 PROCEDURE — 36415 COLL VENOUS BLD VENIPUNCTURE: CPT | Performed by: EMERGENCY MEDICINE

## 2021-07-24 PROCEDURE — 71046 X-RAY EXAM CHEST 2 VIEWS: CPT

## 2021-07-24 PROCEDURE — 250N000013 HC RX MED GY IP 250 OP 250 PS 637: Performed by: EMERGENCY MEDICINE

## 2021-07-24 PROCEDURE — 93005 ELECTROCARDIOGRAM TRACING: CPT | Performed by: EMERGENCY MEDICINE

## 2021-07-24 PROCEDURE — 85025 COMPLETE CBC W/AUTO DIFF WBC: CPT | Performed by: EMERGENCY MEDICINE

## 2021-07-24 RX ORDER — MAGNESIUM HYDROXIDE/ALUMINUM HYDROXICE/SIMETHICONE 120; 1200; 1200 MG/30ML; MG/30ML; MG/30ML
15 SUSPENSION ORAL ONCE
Status: COMPLETED | OUTPATIENT
Start: 2021-07-24 | End: 2021-07-24

## 2021-07-24 RX ADMIN — ALUMINUM HYDROXIDE, MAGNESIUM HYDROXIDE, AND SIMETHICONE 15 ML: 200; 200; 20 SUSPENSION ORAL at 20:27

## 2021-07-24 ASSESSMENT — ENCOUNTER SYMPTOMS
DIAPHORESIS: 1
LIGHT-HEADEDNESS: 1

## 2021-07-24 NOTE — ED NOTES
Spoke with Arlene at [facility] [phone] OK to send back to [facility]. Will set up medical transport.    [address] [facility] [place] [address]

## 2021-07-24 NOTE — ED TRIAGE NOTES
Pt just got back to the  yesterday from  [place]. Pt denies any SIB.  Pt has thoughts of biting and cutting herself for SI. Last SI attempt was in 2010. Denies change in meds.    Pt reports she had chest pain at 0430 this AM and vomited. Sweating in her palms, aching, back aches.  Pt reports 10/10 upper chest.  Stabbing in nature.

## 2021-07-24 NOTE — ED NOTES
Phone call with group home staff, Arlene [phone].  Staff state that pt has been repeatedly telling staff she was not feeling good, having chest pain, shaking many kinds of physical complaints. They tried to work with her to use her skills and plan and not going to the hospital. She often has physical complaints and wants to go to the hospital.  Tonight she was not making any suicidal comments. Staff express frustration with pts repeated ED visits for various complaints. Pt will often ask to go to different hospitals.   Pts team of mental health supports will be meeting on 8/3 to address the repeated ED visits.    Pt can return home if cleared by the ED.

## 2021-07-24 NOTE — ED TRIAGE NOTES
Brought in by EMS from  for SI and c/o chest pain.  Pt states she was here last week and just got back recently. Pt had a argument with a resident which triggered her.

## 2021-07-24 NOTE — ED NOTES
Bed: HW03  Expected date: 7/23/21  Expected time: 9:45 PM  Means of arrival: Ambulance  Comments:  N717  20F  SI

## 2021-07-24 NOTE — ED PROVIDER NOTES
ED Provider Note  [facility]      History     Chief Complaint   Patient presents with     Suicidal     Chest Pain     HPI  Sadaf Ross is a 20 year old female with a medical history significant for bipolar affective disorder, borderline personality disorder, depressive disorder and ADHD who presents to the Emergency Department for evaluation of chest pain and suicidal ideation.  Patient reports that she has been having diffuse chest pain the last couple of days.  She denies any radiation of the pain.  She notes some mild shortness of breath associated with the chest pain, but she denies any cough or fevers.  She also denies any recent leg pain or swelling.  She denies any history of DVT/PE.  Patient denies any history of cardiac disease.  She denies any drug or alcohol use.    Patient reports that recently she has also been feeling suicidal and has a plan of biting herself.  Patient denies hurting herself this evening.  She reports that recently she has also been hallucinating seeing her dad.  Patient denies any homicidal ideation.    We will have the DEC  evaluate the patient. Please see DEC 's note in Epic dated 07/24/21 for further details.    Past Medical History  Past Medical History:   Diagnosis Date     ADHD (attention deficit hyperactivity disorder)      Bipolar 1 disorder (H)      Borderline personality disorder (H)      Depression      Depressive disorder      Intellectual disability      Obesity      Syncope      Past Surgical History:   Procedure Laterality Date     APPENDECTOMY       APPENDECTOMY       acetaminophen (TYLENOL) 650 MG CR tablet  alum & mag hydroxide-simethicone (MAALOX) 200-200-20 MG/5ML SUSP suspension  ARIPiprazole ER (ABILIFY MAINTENA) 400 MG extended release inj syringe  calcium carbonate (TUMS) 500 MG chewable tablet  chlorhexidine (CHLORHEXIDINE) 0.12 % solution  docusate sodium (COLACE) 100 MG capsule  hydrOXYzine (ATARAX) 25 MG  "tablet  ibuprofen (ADVIL/MOTRIN) 400 MG tablet  lithium (ESKALITH) 150 MG capsule  naltrexone (DEPADE/REVIA) 50 MG tablet  norgestimate-ethinyl estradiol (SPRINTEC 28) 0.25-35 MG-MCG tablet  omeprazole (PRILOSEC) 40 MG DR capsule  polyethylene glycol (MIRALAX) 17 g packet  traZODone (DESYREL) 50 MG tablet  venlafaxine (EFFEXOR-XR) 150 MG 24 hr capsule  Vitamin D, Cholecalciferol, 25 MCG (1000 UT) TABS      Allergies   Allergen Reactions     Penicillins Rash and Unknown     Family History  Family History   Problem Relation Age of Onset     Diabetes Type 1 Father      Cancer Paternal Grandfather      Social History   Social History     Tobacco Use     Smoking status: Current Some Day Smoker     Years: 5.00     Types: Cigarettes     Smokeless tobacco: Never Used   Substance Use Topics     Alcohol use: No     Drug use: No      Past medical history, past surgical history, medications, allergies, family history, and social history were reviewed with the patient. No additional pertinent items.       Review of Systems  A complete review of systems was performed with pertinent positives and negatives noted in the HPI, and all other systems negative.    Physical Exam   BP: (!) 137/92  Pulse: 88  Temp: 98.5  F (36.9  C)  Resp: 18  Height: 162.6 cm (5' 4\")  Weight: 98.9 kg (218 lb)  SpO2: 97 %  Physical Exam  Physical Exam   Constitutional: oriented to person, place, and time. appears well-developed and well-nourished.   HENT:   Head: Normocephalic and atraumatic.   Neck: Normal range of motion.   Pulmonary/Chest: Effort normal. No respiratory distress. Reproducible chest wall tenderness over the sternum, no underlying swelling/erythema.  Cardiac: No murmurs, rubs, gallops. RRR.  Abdominal: Abdomen soft, nontender, nondistended. No rebound tenderness. No CVA TTP bilaterally.  MSK: Long bones without deformity or evidence of trauma  Neurological: alert and oriented to person, place, and time.   Skin: Skin is warm and dry. "   Psychiatric:  normal mood and affect.  behavior is normal. Thought content normal.     ED Course      Procedures     12:56 AM  The patient was seen and examined by Sourav Nuñez MD in HW03.               EKG Interpretation:      Interpreted by Sourav Nuñez MD  Time reviewed: 0000  Symptoms at time of EKG: chest pain   Rhythm: normal sinus   Rate: normal  Axis: normal  Ectopy: none  Conduction: normal  ST Segments/ T Waves: No ST-T wave changes  Q Waves: none  Comparison to prior: Unchanged    Clinical Impression: normal EKG             Results for orders placed or performed during the hospital encounter of 07/23/21   EKG 12 lead     Status: None (Preliminary result)   Result Value Ref Range    Systolic Blood Pressure  mmHg    Diastolic Blood Pressure  mmHg    Ventricular Rate 75 BPM    Atrial Rate 75 BPM    WY Interval 180 ms    QRS Duration 86 ms     ms    QTc 433 ms    P Axis 52 degrees    R AXIS 18 degrees    T Axis 9 degrees    Interpretation ECG Sinus rhythm  Normal ECG        Medications   alum & mag hydroxide-simethicone (MAALOX) suspension 30 mL (30 mLs Oral Given 7/23/21 2443)   lidocaine (XYLOCAINE) 2 % solution 10 mL (10 mLs Mouth/Throat Given 7/23/21 0813)        Assessments & Plan (with Medical Decision Making)   MDM  Patient with chest pain and SI. Patient has daily SI. No increase today. She has no lethal intent. Denied plan to our . Only thoughts. No hallucinations/voices.  Patient has daily suicidal ideations and is rarely admitted.  She does feel comfortable going home, will plan discharge back to group home.    Chest pain is largely low risk and reproducible on examination.  No symptoms of pericarditis or EKG findings consistent with this.  No ST elevation or depression concerning for ischemia, troponin is negative, symptoms of been present for 4 days so very unlikely cardiac etiology.  Low risk by Wells and negative PERC, will not work-up for DVT or pulmonary embolism at  this point.  Chest x-ray is otherwise clear.  Recommended conservative treatment.    Addendum: Patient is own guardian and has taken taxis home, confirmed this with group home staff and patient.    I have reviewed the nursing notes. I have reviewed the findings, diagnosis, plan and need for follow up with the patient.    New Prescriptions    No medications on file       Final diagnoses:   Suicidal thoughts       --  I, Viktor Hamm, am serving as a trained medical scribe to document services personally performed by Sourav Nuñez MD, based on the provider's statements to me.     I, Sourav Nuñez MD, was physically present and have reviewed and verified the accuracy of this note documented by Viktor Hamm.    Sourav Nuñez MD  [facility] EMERGENCY DEPARTMENT  7/23/2021     Sourav Nuñez MD  07/24/21 0249       Sourav Nuñez MD  07/24/21 0258

## 2021-07-24 NOTE — TELEPHONE ENCOUNTER
"Patient is calling back to FNA(see previous encounter/triage)\"I am still having abdominal pain, sweating, shaking. I have seizures every night. They told me to go in but the house said I can't keep going to ER, there is nothing they can do for me. What should I do?\"   Advised 911 or ED  Nydia Mcfadden RN [facility]      "

## 2021-07-24 NOTE — TELEPHONE ENCOUNTER
Sweating, shaking, heart is hurting, stomach hurts, back pain. Recently in the hospital. Xrays came back fine. Heart rate is high that they're concerned about her. Wants to know what to do now. She will call 911 now.  Nancy Hoover RN  [facility]      Reason for Disposition    [1] Chest pain lasts > 5 minutes AND [2] described as crushing, pressure-like, or heavy    Additional Information    Negative: Severe difficulty breathing (e.g., struggling for each breath, speaks in single words)    Negative: Difficult to awaken or acting confused (e.g., disoriented, slurred speech)    Negative: Shock suspected (e.g., cold/pale/clammy skin, too weak to stand, low BP, rapid pulse)    Negative: [1] Chest pain lasts > 5 minutes AND [2] history of heart disease  (i.e., heart attack, bypass surgery, angina, angioplasty, CHF; not just a heart murmur)    Protocols used: CHEST PAIN-A-

## 2021-07-24 NOTE — DISCHARGE INSTRUCTIONS
Please continue to take your medications as prescribed    Follow-up with your mental health providers as planned.

## 2021-07-24 NOTE — TELEPHONE ENCOUNTER
"Pt c/o chest pain, shortness of breath, abdominal pain and \"I can barely walk\" since today at 4:30 AM. Also states \"I am also feeling suicidal\". Advised ED now. She does not have transportation so advised call 911 for ambulance. Pt voiced understanding and agreement.       Reason for Disposition    Chest pain    Difficulty breathing    Additional Information    Negative: Severe difficulty breathing (e.g., struggling for each breath, speaks in single words)    Negative: Difficult to awaken or acting confused (e.g., disoriented, slurred speech)    Negative: Shock suspected (e.g., cold/pale/clammy skin, too weak to stand, low BP, rapid pulse)    Negative: [1] Chest pain lasts > 5 minutes AND [2] history of heart disease  (i.e., heart attack, bypass surgery, angina, angioplasty, CHF; not just a heart murmur)    Negative: [1] Chest pain lasts > 5 minutes AND [2] described as crushing, pressure-like, or heavy    Negative: [1] Chest pain lasts > 5 minutes AND [2] age > 50    Negative: [1] Chest pain lasts > 5 minutes AND [2] age > 30 AND [3] at least one cardiac risk factor (i.e., hypertension, diabetes, obesity, smoker or strong family history of heart disease)    Negative: [1] Chest pain lasts > 5 minutes AND [2] not relieved with nitroglycerin    Negative: Passed out (i.e., lost consciousness, collapsed and was not responding)    Negative: Heart beating < 50 beats per minute OR > 140 beats per minute    Negative: Visible sweat on face or sweat dripping down face    Negative: Sounds like a life-threatening emergency to the triager    Negative: Followed a chest injury    Protocols used: BREATHING DIFFICULTY-A-AH, CHEST PAIN-A-AH      "

## 2021-07-24 NOTE — ED TRIAGE NOTES
Pt states that she was discharged this morning from this ED. Pt states that she had this pain when she left this morning but it has become worse. Pt told EMS that the pain is just like her GERD pain. Has a hx of GERD per pt.

## 2021-07-24 NOTE — ED NOTES
Patient discharged sent with transport back to group home belongings sent with transport. Verbalized understanding of plan of care and discharge instructions

## 2021-07-24 NOTE — PROGRESS NOTES
2021  Sadaf Ross 2000     [facility] Crisis Assessment:    Started at: 2:29 AM  Completed at: 2:42 AM  Patient was assessed via virtually ([employer] cart or other teleconferencing device).    Chief Complaint and History of Presenting Problem:  Patient is a 20 year old  female who presented to the ED by [facility] related to concerns for chest pain and suicidal thoughts.     Psychotherapy techniques or interventions utilized throughout assessment include: Establishing rapport, Active listening, Assess dimensions of crisis, Apply solution-focused therapy to address current crisis and Establish a discharge plan    Background  Patient lives with others in a group home and is their own legal guardian.   Mental Health History and Current Symptoms   Patient identifies historical diagnoses of Bipolar Disorder. At baseline, patient describes their mental health symptoms as difficulty sleeping, depressed, no appetite and suicidal thoughts.     Pt stated her whole body was hurting and she was having suicidal thoughts. She reported her house mate told her she wishes she was dead. She noted they get into fights a lot and she has suicidal thoughts daily, and added her dad  this past February.     Current Providers  Primary Care Provider: Yes , [facility]  Psychiatrist: Yes ,   Therapist: Yes , Ady  : Yes , Jyoti GROVE: No  ACT Team: No  Other: No    Has an DANDY been signed? No  History of psychiatric hospitalizations? Yes  History of civil commitment? Unknown  History of programmatic care? Unknown  Current psychotropic medications? Yes  Medication Compliant? pt stated she stopped for two weeks, but is starting up again  Recent medication changes? Unknown    Relevant Medical Concerns  Patient identifies concerns with completing ADLs? No  Patient can ambulate independently? Yes  Other medical health concerns? No  History of concussion or TBI? No     Abuse, Maltreatment, and Trauma History  Physical  abuse: Unknown  Emotional/psychological abuse: Unknown  Sexual abuse: Unknown  Loss of a friend or family member to suicide: Unknown  Other identified traumatic event or significant stressor: No    Current Symptoms  Attention, Hyperactivity, and Impulsivity: No   Anxiety:No    Behavioral Difficulties: Yes: Disruptive and Other: pt has had several ED visits recently for no apparent reason   Mood Symptoms: Yes: Appetite change/weight change , Loss of interest / Anhedonia  and Sleep disturbance    Appetite: Yes: Loss of Appetite   Feeding and Eating: No  Interpersonal Functioning: Yes: Cognitive Distortions and Impaired Interpersonal Functioning  Learning Disabilities/Cognitive/Developmental Disorders: Yes: Developmental Incidents, Functional Impairments and Self-Regulation   General Cognitive Impairments: Yes: Decision-Making and Judgment/Insight    Substance Use  Patient does not have a history of substance use  Patient has recently completed a drug screen or BAL/Breathalyzer? No    History of Suicidal Ideation, Suicide Attempts, and Risk Formulation:   Patient does  have a history of chronic suicidal ideation. Patient is able to identify triggers to suicidal ideation which include conflict in her group home. Patient does  acknowledge a history of suicide attempts; last was in 2010. Patient does  have a history of self-injurious behavior.     ESS-6  1.a. Over the past 2 weeks, have you had thoughts of killing yourself? Yes   1.b. Have you ever attempted to kill yourself and, if yes, when did this last happen? Yes 2010  2. Recent or current suicide plan? No  3. Recent or current intent to act on ideation? No  4. Lifetime psychiatric hospitalization? Yes  5. Pattern of excessive substance use? No  6. Current irritability, agitation, or aggression? No     Protective factors against suicide include community support, future oriented towards goals, hopes, dreams and group home staff.    Other Risk  "Areas  Aggressive/assumptive/homicidal risk factors: No   Duty to warn?No   Was a Child Protection Report Made? No   Was a Adult Protection Report Made? No      Sexually inappropriate behavior? No      Vulnerability to sexual exploitation? No     Mental Status Exam:  Affect: Appropriate and Blunted  Appearance: Appropriate   Attention Span/Concentration: Attentive    Eye Contact: Engaged  Fund of Knowledge: Appropriate   Language /Speech Content: Fluent  Language /Speech Volume: Soft   Language /Speech Rate/Productions: Minimally Responsive and Normal   Recent Memory: Intact  Remote Memory: Intact  Mood: Normal   Orientation:   Person: Yes   Place: Yes  Time of Day: Yes   Date: Yes   Situation (Do they understand why they are here?): Yes   Psychomotor Behavior: Normal   Thought Content: Clear  Thought Form: Goal Directed and Intact    Clinical Summary and Disposition  Clinical summary: This writer recommends pt be discharged back to her group home. Pt has chronic suicidal thoughts that are no different tonight from any other day. She has told ED staff that she had thoughts of biting herself to death. This is consistent with past reports by pt when in the ED. She was just discharged from a two day inpatient stay at [facility]. It appears that she prefers to be in the hospital. Writer advised pt that it would be best for her to go home tonight and continue working with her outpatient supports. She said, \"okay.\" Pt is not psychotic or manic, nor abusing substances. She will be discharged back to her group home. Arlene, staff there, is agreeable with this, and stated pt does take cabs and should do so tonight. The have never had any issues with this and a staff will be awake to let her in. Confirmed the address. Consulted with Dr Nuñez who agrees with plan for discharge.    Patient's strengths, protective factors, and community resources include PCP, therapist, psychiatrist,  and group home staff. "     Diagnosis:  Bipolar Disorder  Intellectual Disability  Borderline Personality Disorder      Details of final disposition include: Individual therapy  and Group home: staff .     Duration of face to face time with patient in minutes: .25 hrs      Soha Daiz

## 2021-07-25 ENCOUNTER — NURSE TRIAGE (OUTPATIENT)
Dept: NURSING | Facility: CLINIC | Age: 21
End: 2021-07-25

## 2021-07-25 LAB
ATRIAL RATE - MUSE: 68 BPM
ATRIAL RATE - MUSE: 75 BPM
DIASTOLIC BLOOD PRESSURE - MUSE: NORMAL MMHG
DIASTOLIC BLOOD PRESSURE - MUSE: NORMAL MMHG
INTERPRETATION ECG - MUSE: NORMAL
INTERPRETATION ECG - MUSE: NORMAL
P AXIS - MUSE: 47 DEGREES
P AXIS - MUSE: 52 DEGREES
PR INTERVAL - MUSE: 180 MS
PR INTERVAL - MUSE: 186 MS
QRS DURATION - MUSE: 86 MS
QRS DURATION - MUSE: 90 MS
QT - MUSE: 388 MS
QT - MUSE: 402 MS
QTC - MUSE: 427 MS
QTC - MUSE: 433 MS
R AXIS - MUSE: 18 DEGREES
R AXIS - MUSE: 36 DEGREES
SYSTOLIC BLOOD PRESSURE - MUSE: NORMAL MMHG
SYSTOLIC BLOOD PRESSURE - MUSE: NORMAL MMHG
T AXIS - MUSE: 8 DEGREES
T AXIS - MUSE: 9 DEGREES
VENTRICULAR RATE- MUSE: 68 BPM
VENTRICULAR RATE- MUSE: 75 BPM

## 2021-07-25 NOTE — ED NOTES
Spoke with  manager, Arlene, who confirmed demographics address and stated that  staff would be there to receive the pt upon arrival.

## 2021-07-25 NOTE — TELEPHONE ENCOUNTER
Patient calls regarding multiple symptoms she has been having. She reports that she has been very dizzy for the past 1-2 hours, she almost fell in the shower not long ago. She is sweating and feels weak. She has some chest tightness and mild difficulty breathing. Patient reports having seizures, last one about 5 PM yesterday. Headache is present that is not a new symptom.      Patient advised on ED visit per protocol. She reports that she lives in a group home and does not have anyone to drive her. Patient will call 911 at this time.    Luana Cook RN  [facility]      Reason for Disposition    Dizziness or lightheadedness    Difficulty breathing    Additional Information    Negative: Severe difficulty breathing (e.g., struggling for each breath, speaks in single words)    Negative: Difficult to awaken or acting confused (e.g., disoriented, slurred speech)    Negative: Shock suspected (e.g., cold/pale/clammy skin, too weak to stand, low BP, rapid pulse)    Negative: [1] Chest pain lasts > 5 minutes AND [2] history of heart disease  (i.e., heart attack, bypass surgery, angina, angioplasty, CHF; not just a heart murmur)    Negative: [1] Chest pain lasts > 5 minutes AND [2] described as crushing, pressure-like, or heavy    Negative: [1] Chest pain lasts > 5 minutes AND [2] age > 50    Negative: [1] Chest pain lasts > 5 minutes AND [2] age > 30 AND [3] at least one cardiac risk factor (i.e., hypertension, diabetes, obesity, smoker or strong family history of heart disease)    Negative: [1] Chest pain lasts > 5 minutes AND [2] not relieved with nitroglycerin    Negative: Passed out (i.e., lost consciousness, collapsed and was not responding)    Negative: Heart beating < 50 beats per minute OR > 140 beats per minute    Negative: Visible sweat on face or sweat dripping down face    Negative: Sounds like a life-threatening emergency to the triager    Negative: Followed a chest injury    Negative:  "SEVERE chest pain    Negative: [1] Intermittent  chest pain or \"angina\" AND [2] increasing in severity or frequency  (Exception: pains lasting a few seconds)    Negative: Pain also present in shoulder(s) or arm(s) or jaw  (Exception: pain is clearly made worse by movement)    Protocols used: CHEST PAIN-A-AH    COVID 19 Nurse Triage Plan/Patient Instructions    Please be aware that novel coronavirus (COVID-19) may be circulating in the community. If you develop symptoms such as fever, cough, or SOB or if you have concerns about the presence of another infection including coronavirus (COVID-19), please contact your health care provider or visit [email or web address].     Disposition/Instructions    ED Visit recommended. Follow protocol based instructions.     Bring Your Own Device:  Please also bring your smart device(s) (smart phones, tablets, laptops) and their charging cables for your personal use and to communicate with your care team during your visit.    Thank you for taking steps to prevent the spread of this virus.  o Limit your contact with others.  o Wear a simple mask to cover your cough.  o Wash your hands well and often.    Resources    [facility]: About COVID-19: [email or web address]    CDC: What to Do If You're Sick: www.cdc.gov/coronavirus/2019-ncov/about/steps-when-sick.html    CDC: Ending Home Isolation: www.cdc.gov/coronavirus/2019-ncov/hcp/disposition-in-home-patients.html     CDC: Caring for Someone: www.cdc.gov/coronavirus/2019-ncov/if-you-are-sick/care-for-someone.html     [facility]: Interim Guidance for Hospital Discharge to Home: www.health.[facility].[place]/diseases/coronavirus/hcp/hospdischarge.pdf    [facility] clinical trials (COVID-19 research studies): [email or web address]     Below are the COVID-19 hotlines at the [place] [facility] ([facility]). Interpreters are available.   o For health questions: Call 595-887-5313 or " 1-637.339.3531 (7 a.m. to 7 p.m.)  o For questions about schools and childcare: Call 324-224-1759 or 1-348.629.5151 (7 a.m. to 7 p.m.)

## 2021-07-25 NOTE — ED NOTES
DEC  called this writer stating pt had been seen this morning at 5am, pt not presenting with any mental health concerns at this time,  believes this is behavioral.  MD notified.

## 2021-07-25 NOTE — ED PROVIDER NOTES
ED Provider Note  [facility]      History     Chief Complaint   Patient presents with     Chest Pain     Pt states that she was discharged from here this morning with this pain. Pain is worse now     Abdominal Pain     Patient reports abdominal pain that radiates up into her esophagus.     The history is provided by the patient and medical records.     Sadaf Ross is a 20 year old female with a PMH notable for GERD, bipolar affective disorder, borderline personality disorder, depressive disorder and ADHD who presents to the Emergency Department for evaluation of chest pain/heart burn and suicidal ideation. Patient was seen in the ED yesterday for the same primary complaints and was discharged this morning. The heart burn started at 4:30 pm today. Patient reports that the heart burn has worsened since her discharge this morning. She reports feeling shaky, light headed, and heavy perspiration.  She stated that this pain occurs on a daily basis. She takes Tums for the pain, but denies taking any medications today.     Patient reports the suicidal ideation started earlier today and she feels like she is going to hurt herself. On her way to the ED she was biting her right hand.  Patient denies drug use, but does smoke cigarettes.          Past Medical History  Past Medical History:   Diagnosis Date     ADHD (attention deficit hyperactivity disorder)      Bipolar 1 disorder (H)      Borderline personality disorder (H)      Depression      Depressive disorder      Intellectual disability      Obesity      Syncope      Past Surgical History:   Procedure Laterality Date     APPENDECTOMY       APPENDECTOMY       acetaminophen (TYLENOL) 650 MG CR tablet  alum & mag hydroxide-simethicone (MAALOX) 200-200-20 MG/5ML SUSP suspension  ARIPiprazole ER (ABILIFY MAINTENA) 400 MG extended release inj syringe  calcium carbonate (TUMS) 500 MG chewable tablet  chlorhexidine (CHLORHEXIDINE) 0.12 %  solution  docusate sodium (COLACE) 100 MG capsule  hydrOXYzine (ATARAX) 25 MG tablet  ibuprofen (ADVIL/MOTRIN) 400 MG tablet  lithium (ESKALITH) 150 MG capsule  naltrexone (DEPADE/REVIA) 50 MG tablet  norgestimate-ethinyl estradiol (SPRINTEC 28) 0.25-35 MG-MCG tablet  omeprazole (PRILOSEC) 40 MG DR capsule  polyethylene glycol (MIRALAX) 17 g packet  traZODone (DESYREL) 50 MG tablet  venlafaxine (EFFEXOR-XR) 150 MG 24 hr capsule  Vitamin D, Cholecalciferol, 25 MCG (1000 UT) TABS      Allergies   Allergen Reactions     Penicillins Rash and Unknown     Family History  Family History   Problem Relation Age of Onset     Diabetes Type 1 Father      Cancer Paternal Grandfather      Social History   Social History     Tobacco Use     Smoking status: Current Some Day Smoker     Years: 5.00     Types: Cigarettes     Smokeless tobacco: Never Used   Substance Use Topics     Alcohol use: No     Drug use: No      Past medical history, past surgical history, medications, allergies, family history, and social history were reviewed with the patient. No additional pertinent items.       Review of Systems   Constitutional: Positive for diaphoresis.   Cardiovascular: Positive for chest pain (Heart Burn).   Neurological: Positive for light-headedness.   Psychiatric/Behavioral: Positive for suicidal ideas.   All other systems reviewed and are negative.    A complete review of systems was performed with pertinent positives and negatives noted in the HPI, and all other systems negative.    Physical Exam   BP: (P) 114/63  Pulse: (P) 95  Temp: (P) 97.8  F (36.6  C)  Resp: (P) 18  SpO2: (P) 96 %  Physical Exam  Vitals and nursing note reviewed.   Constitutional:       General: She is not in acute distress.     Appearance: She is well-developed. She is not diaphoretic.   HENT:      Head: Atraumatic.      Mouth/Throat:      Pharynx: No oropharyngeal exudate.   Eyes:      General: No scleral icterus.     Pupils: Pupils are equal, round, and  reactive to light.   Cardiovascular:      Rate and Rhythm: Normal rate and regular rhythm.      Heart sounds: Normal heart sounds.   Pulmonary:      Effort: No respiratory distress.      Breath sounds: Normal breath sounds.   Abdominal:      General: Bowel sounds are normal.      Palpations: Abdomen is soft.      Tenderness: There is no abdominal tenderness.   Musculoskeletal:         General: No tenderness.   Skin:     General: Skin is warm.      Findings: No rash.   Neurological:      Mental Status: She is alert.           ED Course      Procedures          Results for orders placed or performed during the hospital encounter of 07/23/21   XR Chest 2 Views     Status: None    Narrative    EXAM: XR CHEST 2 VW  LOCATION: [facility]  DATE/TIME: 7/24/2021 3:18 AM    INDICATION: chest pain  COMPARISON: 07/09/2021      Impression    IMPRESSION: Negative chest.   Comprehensive metabolic panel     Status: Abnormal   Result Value Ref Range    Sodium 138 133 - 144 mmol/L    Potassium 3.8 3.4 - 5.3 mmol/L    Chloride 108 94 - 109 mmol/L    Carbon Dioxide (CO2) 25 20 - 32 mmol/L    Anion Gap 5 3 - 14 mmol/L    Urea Nitrogen 13 7 - 30 mg/dL    Creatinine 0.87 0.52 - 1.04 mg/dL    Calcium 8.8 8.5 - 10.1 mg/dL    Glucose 94 70 - 99 mg/dL    Alkaline Phosphatase 67 40 - 150 U/L    AST 92 (H) 0 - 45 U/L     (H) 0 - 50 U/L    Protein Total 7.4 6.8 - 8.8 g/dL    Albumin 3.8 3.4 - 5.0 g/dL    Bilirubin Total 0.3 0.2 - 1.3 mg/dL    GFR Estimate >90 >60 mL/min/1.73m2   Troponin I     Status: Normal   Result Value Ref Range    Troponin I <0.015 0.000 - 0.045 ug/L   CBC with platelets and differential     Status: Abnormal   Result Value Ref Range    WBC Count 10.4 4.0 - 11.0 10e3/uL    RBC Count 3.95 3.80 - 5.20 10e6/uL    Hemoglobin 11.4 (L) 11.7 - 15.7 g/dL    Hematocrit 33.8 (L) 35.0 - 47.0 %    MCV 86 78 - 100 fL    MCH 28.9 26.5 - 33.0 pg    MCHC 33.7 31.5 - 36.5 g/dL    RDW 12.8  10.0 - 15.0 %    Platelet Count 252 150 - 450 10e3/uL    % Neutrophils 62 %    % Lymphocytes 30 %    % Monocytes 5 %    % Eosinophils 2 %    % Basophils 1 %    % Immature Granulocytes 0 %    NRBCs per 100 WBC 0 <1 /100    Absolute Neutrophils 6.4 1.6 - 8.3 10e3/uL    Absolute Lymphocytes 3.1 0.8 - 5.3 10e3/uL    Absolute Monocytes 0.5 0.0 - 1.3 10e3/uL    Absolute Eosinophils 0.2 0.0 - 0.7 10e3/uL    Absolute Basophils 0.1 0.0 - 0.2 10e3/uL    Absolute Immature Granulocytes 0.0 <=0.0 10e3/uL    Absolute NRBCs 0.0 10e3/uL   Glucose by meter     Status: Normal   Result Value Ref Range    GLUCOSE BY METER POCT 79 70 - 99 mg/dL   EKG 12 lead     Status: None (Preliminary result)   Result Value Ref Range    Systolic Blood Pressure  mmHg    Diastolic Blood Pressure  mmHg    Ventricular Rate 75 BPM    Atrial Rate 75 BPM    GA Interval 180 ms    QRS Duration 86 ms     ms    QTc 433 ms    P Axis 52 degrees    R AXIS 18 degrees    T Axis 9 degrees    Interpretation ECG Sinus rhythm  Normal ECG      CBC with platelets differential     Status: Abnormal    Narrative    The following orders were created for panel order CBC with platelets differential.  Procedure                               Abnormality         Status                     ---------                               -----------         ------                     CBC with platelets and d...[[id number]]  Abnormal            Final result                 Please view results for these tests on the individual orders.     Medications   alum & mag hydroxide-simethicone (MAALOX) suspension 15 mL (15 mLs Oral Given 7/24/21 2027)        Assessments & Plan (with Medical Decision Making)      20 year old female with a PMH notable for GERD, bipolar affective disorder, borderline personality disorder, depressive disorder and ADHD who presents to the Emergency Department for evaluation of chest pain/heart burn and suicidal ideation.  Patient's recent emergency department course  reviewed with discharge earlier in the morning prior to arrival again in ED.  Patient given Maalox p.o. without relief of her pain.  Patient transferred back to group home.    I have reviewed the nursing notes. I have reviewed the findings, diagnosis, plan and need for follow up with the patient.    New Prescriptions    No medications on file       Final diagnoses:   Gastroesophageal reflux disease with esophagitis without hemorrhage       --  I, Beverly Howard, am serving as a trained medical scribe to document services personally performed by Becky Duggan MD, based on the provider's statements to me.     IBecky MD, was physically present and have reviewed and verified the accuracy of this note documented by Beverly Howard.    Becky Duggan MD  [facility] EMERGENCY DEPARTMENT  7/24/2021     Bekcy Duggan MD  08/16/21 [id number]

## 2021-07-28 ENCOUNTER — LAB REQUISITION (OUTPATIENT)
Dept: LAB | Facility: CLINIC | Age: 21
End: 2021-07-28
Payer: MEDICARE

## 2021-07-28 DIAGNOSIS — F41.9 ANXIETY DISORDER, UNSPECIFIED: ICD-10-CM

## 2021-07-28 LAB
ALBUMIN SERPL-MCNC: 4.1 G/DL (ref 3.4–5)
ALP SERPL-CCNC: 62 U/L (ref 40–150)
ALT SERPL W P-5'-P-CCNC: 72 U/L (ref 0–50)
ANION GAP SERPL CALCULATED.3IONS-SCNC: 5 MMOL/L (ref 3–14)
AST SERPL W P-5'-P-CCNC: 21 U/L (ref 0–45)
BILIRUB SERPL-MCNC: 0.2 MG/DL (ref 0.2–1.3)
BUN SERPL-MCNC: 7 MG/DL (ref 7–30)
CALCIUM SERPL-MCNC: 9 MG/DL (ref 8.5–10.1)
CHLORIDE BLD-SCNC: 107 MMOL/L (ref 94–109)
CO2 SERPL-SCNC: 25 MMOL/L (ref 20–32)
CREAT SERPL-MCNC: 0.76 MG/DL (ref 0.52–1.04)
GFR SERPL CREATININE-BSD FRML MDRD: >90 ML/MIN/1.73M2
GLUCOSE BLD-MCNC: 109 MG/DL (ref 70–99)
LIPASE SERPL-CCNC: 153 U/L (ref 73–393)
LITHIUM SERPL-SCNC: <0.2 MMOL/L
POTASSIUM BLD-SCNC: 4.2 MMOL/L (ref 3.4–5.3)
PROT SERPL-MCNC: 7.5 G/DL (ref 6.8–8.8)
SODIUM SERPL-SCNC: 137 MMOL/L (ref 133–144)

## 2021-07-28 PROCEDURE — 83690 ASSAY OF LIPASE: CPT | Mod: ORL | Performed by: INTERNAL MEDICINE

## 2021-07-28 PROCEDURE — 80053 COMPREHEN METABOLIC PANEL: CPT | Mod: ORL | Performed by: INTERNAL MEDICINE

## 2021-07-28 PROCEDURE — 80178 ASSAY OF LITHIUM: CPT | Mod: ORL | Performed by: INTERNAL MEDICINE

## 2021-07-31 ENCOUNTER — HOSPITAL ENCOUNTER (EMERGENCY)
Facility: CLINIC | Age: 21
Discharge: NURSING, REHAB OR LONG-TERM CARE | End: 2021-07-31
Attending: EMERGENCY MEDICINE | Admitting: EMERGENCY MEDICINE
Payer: MEDICARE

## 2021-07-31 VITALS
TEMPERATURE: 97.6 F | OXYGEN SATURATION: 98 % | DIASTOLIC BLOOD PRESSURE: 73 MMHG | SYSTOLIC BLOOD PRESSURE: 119 MMHG | HEART RATE: 92 BPM | RESPIRATION RATE: 16 BRPM

## 2021-07-31 DIAGNOSIS — F60.3 BORDERLINE PERSONALITY DISORDER (H): ICD-10-CM

## 2021-07-31 DIAGNOSIS — F50.20 PURGING: ICD-10-CM

## 2021-07-31 DIAGNOSIS — R19.7 DIARRHEA, UNSPECIFIED TYPE: ICD-10-CM

## 2021-07-31 LAB
ALBUMIN SERPL-MCNC: 3.6 G/DL (ref 3.4–5)
ALBUMIN UR-MCNC: 20 MG/DL
ALP SERPL-CCNC: 55 U/L (ref 40–150)
ALT SERPL W P-5'-P-CCNC: 76 U/L (ref 0–50)
AMORPH CRY #/AREA URNS HPF: ABNORMAL /HPF
ANION GAP SERPL CALCULATED.3IONS-SCNC: 6 MMOL/L (ref 3–14)
APPEARANCE UR: ABNORMAL
AST SERPL W P-5'-P-CCNC: ABNORMAL U/L
BACTERIA #/AREA URNS HPF: ABNORMAL /HPF
BASOPHILS # BLD AUTO: 0 10E3/UL (ref 0–0.2)
BASOPHILS NFR BLD AUTO: 0 %
BILIRUB SERPL-MCNC: 0.3 MG/DL (ref 0.2–1.3)
BILIRUB UR QL STRIP: NEGATIVE
BUN SERPL-MCNC: 10 MG/DL (ref 7–30)
CALCIUM SERPL-MCNC: 8.2 MG/DL (ref 8.5–10.1)
CHLORIDE BLD-SCNC: 112 MMOL/L (ref 94–109)
CO2 SERPL-SCNC: 20 MMOL/L (ref 20–32)
COLOR UR AUTO: ABNORMAL
CREAT SERPL-MCNC: 0.71 MG/DL (ref 0.52–1.04)
EOSINOPHIL # BLD AUTO: 0.2 10E3/UL (ref 0–0.7)
EOSINOPHIL NFR BLD AUTO: 2 %
ERYTHROCYTE [DISTWIDTH] IN BLOOD BY AUTOMATED COUNT: 12.9 % (ref 10–15)
GFR SERPL CREATININE-BSD FRML MDRD: >90 ML/MIN/1.73M2
GLUCOSE BLD-MCNC: 109 MG/DL (ref 70–99)
GLUCOSE UR STRIP-MCNC: NEGATIVE MG/DL
HCG UR QL: NEGATIVE
HCT VFR BLD AUTO: 35.9 % (ref 35–47)
HGB BLD-MCNC: 12 G/DL (ref 11.7–15.7)
HGB UR QL STRIP: ABNORMAL
IMM GRANULOCYTES # BLD: 0 10E3/UL
IMM GRANULOCYTES NFR BLD: 0 %
INTERNAL QC OK POCT: NORMAL
KETONES UR STRIP-MCNC: NEGATIVE MG/DL
LEUKOCYTE ESTERASE UR QL STRIP: ABNORMAL
LIPASE SERPL-CCNC: 217 U/L (ref 73–393)
LYMPHOCYTES # BLD AUTO: 2.8 10E3/UL (ref 0.8–5.3)
LYMPHOCYTES NFR BLD AUTO: 30 %
MAGNESIUM SERPL-MCNC: 2.1 MG/DL (ref 1.6–2.3)
MCH RBC QN AUTO: 28.7 PG (ref 26.5–33)
MCHC RBC AUTO-ENTMCNC: 33.4 G/DL (ref 31.5–36.5)
MCV RBC AUTO: 86 FL (ref 78–100)
MONOCYTES # BLD AUTO: 0.6 10E3/UL (ref 0–1.3)
MONOCYTES NFR BLD AUTO: 6 %
MUCOUS THREADS #/AREA URNS LPF: PRESENT /LPF
NEUTROPHILS # BLD AUTO: 6 10E3/UL (ref 1.6–8.3)
NEUTROPHILS NFR BLD AUTO: 62 %
NITRATE UR QL: NEGATIVE
NRBC # BLD AUTO: 0 10E3/UL
NRBC BLD AUTO-RTO: 0 /100
PH UR STRIP: 5.5 [PH] (ref 5–7)
PLATELET # BLD AUTO: 289 10E3/UL (ref 150–450)
POTASSIUM BLD-SCNC: 4.6 MMOL/L (ref 3.4–5.3)
PROT SERPL-MCNC: 7.4 G/DL (ref 6.8–8.8)
RBC # BLD AUTO: 4.18 10E6/UL (ref 3.8–5.2)
RBC URINE: 3 /HPF
SODIUM SERPL-SCNC: 138 MMOL/L (ref 133–144)
SP GR UR STRIP: 1.03 (ref 1–1.03)
SQUAMOUS EPITHELIAL: 5 /HPF
UROBILINOGEN UR STRIP-MCNC: NORMAL MG/DL
WBC # BLD AUTO: 9.6 10E3/UL (ref 4–11)
WBC URINE: 6 /HPF

## 2021-07-31 PROCEDURE — 83690 ASSAY OF LIPASE: CPT | Performed by: EMERGENCY MEDICINE

## 2021-07-31 PROCEDURE — 83735 ASSAY OF MAGNESIUM: CPT | Performed by: EMERGENCY MEDICINE

## 2021-07-31 PROCEDURE — 93010 ELECTROCARDIOGRAM REPORT: CPT | Performed by: EMERGENCY MEDICINE

## 2021-07-31 PROCEDURE — 258N000003 HC RX IP 258 OP 636: Performed by: EMERGENCY MEDICINE

## 2021-07-31 PROCEDURE — 84155 ASSAY OF PROTEIN SERUM: CPT | Performed by: EMERGENCY MEDICINE

## 2021-07-31 PROCEDURE — 81001 URINALYSIS AUTO W/SCOPE: CPT | Performed by: EMERGENCY MEDICINE

## 2021-07-31 PROCEDURE — 96360 HYDRATION IV INFUSION INIT: CPT

## 2021-07-31 PROCEDURE — 87086 URINE CULTURE/COLONY COUNT: CPT | Performed by: EMERGENCY MEDICINE

## 2021-07-31 PROCEDURE — 85025 COMPLETE CBC W/AUTO DIFF WBC: CPT | Performed by: EMERGENCY MEDICINE

## 2021-07-31 PROCEDURE — 99284 EMERGENCY DEPT VISIT MOD MDM: CPT | Mod: 25 | Performed by: EMERGENCY MEDICINE

## 2021-07-31 PROCEDURE — 81025 URINE PREGNANCY TEST: CPT | Performed by: EMERGENCY MEDICINE

## 2021-07-31 PROCEDURE — 99284 EMERGENCY DEPT VISIT MOD MDM: CPT | Mod: 25

## 2021-07-31 PROCEDURE — 36415 COLL VENOUS BLD VENIPUNCTURE: CPT | Performed by: EMERGENCY MEDICINE

## 2021-07-31 PROCEDURE — 93005 ELECTROCARDIOGRAM TRACING: CPT

## 2021-07-31 RX ADMIN — SODIUM CHLORIDE 1000 ML: 9 INJECTION, SOLUTION INTRAVENOUS at 18:33

## 2021-07-31 ASSESSMENT — ENCOUNTER SYMPTOMS
CONSTIPATION: 0
COUGH: 0
DIAPHORESIS: 1
ARTHRALGIAS: 0
RHINORRHEA: 0
DYSURIA: 0
DIARRHEA: 1
BACK PAIN: 1
HEADACHES: 1
BLOOD IN STOOL: 0
VOMITING: 1
ABDOMINAL PAIN: 1
PHOTOPHOBIA: 1
WOUND: 0
SORE THROAT: 0
SINUS PAIN: 0
FREQUENCY: 0
LIGHT-HEADEDNESS: 1
NAUSEA: 0
TROUBLE SWALLOWING: 0
CHILLS: 1
SINUS PRESSURE: 0
SPEECH DIFFICULTY: 0
EYE REDNESS: 0
FEVER: 0
NUMBNESS: 0
PALPITATIONS: 0
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
## 2021-08-01 ENCOUNTER — NURSE TRIAGE (OUTPATIENT)
Dept: NURSING | Facility: CLINIC | Age: 21
End: 2021-08-01

## 2021-08-01 LAB
ATRIAL RATE - MUSE: 79 BPM
BACTERIA UR CULT: NORMAL
DIASTOLIC BLOOD PRESSURE - MUSE: NORMAL MMHG
INTERPRETATION ECG - MUSE: NORMAL
P AXIS - MUSE: 27 DEGREES
PR INTERVAL - MUSE: 168 MS
QRS DURATION - MUSE: 86 MS
QT - MUSE: 366 MS
QTC - MUSE: 419 MS
R AXIS - MUSE: 26 DEGREES
SYSTOLIC BLOOD PRESSURE - MUSE: NORMAL MMHG
T AXIS - MUSE: 15 DEGREES
VENTRICULAR RATE- MUSE: 79 BPM

## 2021-08-01 NOTE — DISCHARGE INSTRUCTIONS
You have been seen in the emergency department today for purging and diarrhea.  Your labs look good, we checked your liver function tests, kidney function and pancreas.  You have received fluids here in the emergency department.    We advise that you speak to your therapist about your stressors and urge to purge - this is what they are there for.

## 2021-08-02 ENCOUNTER — HOSPITAL ENCOUNTER (EMERGENCY)
Facility: CLINIC | Age: 21
Discharge: HOME | End: 2021-08-02
Attending: PSYCHIATRY & NEUROLOGY | Admitting: PSYCHIATRY & NEUROLOGY
Payer: MEDICARE

## 2021-08-02 VITALS
TEMPERATURE: 98 F | OXYGEN SATURATION: 97 % | SYSTOLIC BLOOD PRESSURE: 117 MMHG | RESPIRATION RATE: 14 BRPM | DIASTOLIC BLOOD PRESSURE: 60 MMHG | HEART RATE: 88 BPM

## 2021-08-02 DIAGNOSIS — F43.0 ACUTE REACTION TO STRESS: ICD-10-CM

## 2021-08-02 DIAGNOSIS — F41.9 ANXIETY: ICD-10-CM

## 2021-08-02 LAB
AMPHETAMINES UR QL SCN: NORMAL
BARBITURATES UR QL: NORMAL
BENZODIAZ UR QL: NORMAL
CANNABINOIDS UR QL SCN: NORMAL
COCAINE UR QL: NORMAL
ETHANOL UR QL SCN: NORMAL
OPIATES UR QL SCN: NORMAL

## 2021-08-02 PROCEDURE — 99283 EMERGENCY DEPT VISIT LOW MDM: CPT | Performed by: PSYCHIATRY & NEUROLOGY

## 2021-08-02 PROCEDURE — 80307 DRUG TEST PRSMV CHEM ANLYZR: CPT | Performed by: PSYCHIATRY & NEUROLOGY

## 2021-08-02 ASSESSMENT — ENCOUNTER SYMPTOMS
HALLUCINATIONS: 0
CARDIOVASCULAR NEGATIVE: 1
HYPERACTIVE: 0
MUSCULOSKELETAL NEGATIVE: 1
CONSTITUTIONAL NEGATIVE: 1
NEUROLOGICAL NEGATIVE: 1
RESPIRATORY NEGATIVE: 1
NERVOUS/ANXIOUS: 1
GASTROINTESTINAL NEGATIVE: 1
DECREASED CONCENTRATION: 1

## 2021-08-02 NOTE — ED NOTES
"Writer introduced self to patient and asked what brought her in today. Patient's response, \"I passed out in the clinic and they brought me here.\" Writer then asked the patient about suicidal thoughts. Patient reports thoughts, but no plan. Patient reports wanting to be seen so she can get home to her girlfriend. Patient contracts for safety when asked by writer. Patient denies recent self harm.  "

## 2021-08-02 NOTE — ED NOTES
Bed: HW01  Expected date: 8/2/21  Expected time: 11:02 AM  Means of arrival: Ambulance  Comments:  20 sun magdaleno

## 2021-08-02 NOTE — RESULT ENCOUNTER NOTE
Final urine culture report is negative.  Adult Negative Urine culture parameters per protocol: Any # Urogenital single or mixed organism, <10,000 col/ml single organism (cath specimen), and <50,000 col/ml single organism (midstream or cath specimen).  [facility] Emergency Dept discharge antibiotic prescribed (If applicable): None  Treatment recommendations per [facility] ED Lab Result Urine Culture protocol.

## 2021-08-02 NOTE — ED TRIAGE NOTES
Patient presents to the ED from [facility] clinic, according to EMS patient was at follow up appointment after inpatient mental health stay where she endorsed SI and was in a catatonic state.

## 2021-08-02 NOTE — TELEPHONE ENCOUNTER
Sadaf calls and says that she has pain on her left side by her left breast. Pt. Says that the pain is in her chest. Pain began at 7 pm this katerina. Pt. Says that she will call 911 and will be going to [facility] ER. COVID 19 Nurse Triage Plan/Patient Instructions    Please be aware that novel coronavirus (COVID-19) may be circulating in the community. If you develop symptoms such as fever, cough, or SOB or if you have concerns about the presence of another infection including coronavirus (COVID-19), please contact your health care provider or visit [email or web address].     Disposition/Instructions    Call to EMS/911 recommended. Follow protocol based instructions.     Bring Your Own Device:  Please also bring your smart device(s) (smart phones, tablets, laptops) and their charging cables for your personal use and to communicate with your care team during your visit.    Thank you for taking steps to prevent the spread of this virus.  o Limit your contact with others.  o Wear a simple mask to cover your cough.  o Wash your hands well and often.    Resources    [facility]: About COVID-19: [email or web address]    CDC: What to Do If You're Sick: www.cdc.gov/coronavirus/2019-ncov/about/steps-when-sick.html    CDC: Ending Home Isolation: www.cdc.gov/coronavirus/2019-ncov/hcp/disposition-in-home-patients.html     CDC: Caring for Someone: www.cdc.gov/coronavirus/2019-ncov/if-you-are-sick/care-for-someone.html     [facility]: Interim Guidance for Hospital Discharge to Home: www.health.state.[place]/diseases/coronavirus/hcp/hospdischarge.pdf    [facility] clinical trials (COVID-19 research studies): [email or web address]/umn-clinical-trials     Below are the COVID-19 hotlines at the [place] Department of Health ([facility]). Interpreters are available.   o For health questions: Call 748-530-0876 or 1-103.702.9493 (7 a.m. to 7 p.m.)  o For questions about schools and childcare: Call 698-732-0241 or  1-158.483.2177 (7 a.m. to 7 p.m.)                 COVID 19 Nurse Triage Plan/Patient Instructions    Please be aware that novel coronavirus (COVID-19) may be circulating in the community. If you develop symptoms such as fever, cough, or SOB or if you have concerns about the presence of another infection including coronavirus (COVID-19), please contact your health care provider or visit [email or web address].     Disposition/Instructions    Call to EMS/911 recommended. Follow protocol based instructions.     Bring Your Own Device:  Please also bring your smart device(s) (smart phones, tablets, laptops) and their charging cables for your personal use and to communicate with your care team during your visit.    Thank you for taking steps to prevent the spread of this virus.  o Limit your contact with others.  o Wear a simple mask to cover your cough.  o Wash your hands well and often.    Resources    [facility]: About COVID-19: [email or web address]    CDC: What to Do If You're Sick: www.cdc.gov/coronavirus/2019-ncov/about/steps-when-sick.html    CDC: Ending Home Isolation: www.cdc.gov/coronavirus/2019-ncov/hcp/disposition-in-home-patients.html     CDC: Caring for Someone: www.cdc.gov/coronavirus/2019-ncov/if-you-are-sick/care-for-someone.html     [facility]: Interim Guidance for Hospital Discharge to Home: www.health.[facility].[place]/diseases/coronavirus/hcp/hospdischarge.pdf    [facility] clinical trials (COVID-19 research studies): [email or web address]     Below are the COVID-19 hotlines at the [place] Department of Health ([facility]). Interpreters are available.   o For health questions: Call 122-512-0991 or 1-880.601.9645 (7 a.m. to 7 p.m.)  o For questions about schools and childcare: Call 889-280-9942 or 1-575.103.9224 (7 a.m. to 7 p.m.)

## 2021-08-02 NOTE — ED PROVIDER NOTES
"    [facility] EMERGENCY DEPARTMENT ([facility])   August 2, 2021  History     Chief Complaint   Patient presents with     Suicidal     HPI  Sadaf Ross is a 20 year old female with history of intellectual disability, bipolar 1 disorder, ADHD, borderline personality disorder, depression and multiple ED visits who presents with suicidal ideation.  Patient resides in a group home. Per [facility] records, patient resides in a group home and has been described by [facility] ED yesterday as a \"frequent flyer.\"  She has had 15 ED visits just in the past month alone and yesterday presented to [facility] ED twice. \"The patient can become frustrated and anxious and agitated. Generally she then tends to get somewhat confused and utilizes the emergency department as a stabilization tool. The patient admits that she has been somewhat inconsistent with her medications, which the staff feels precipitates some of her instability.\"    Patient today had follow-up at [facility]. She admits to feeling anxious, then had a syncopal episode. She allegedly was also feeling suicidal. Since arrival, she is feeling better and would like to go home. She felt her fainting spell was due to anxiety. She admits to chronic SI and feels she can manage it at her group home. She is eager to get back to see her girlfriend.      PAST MEDICAL HISTORY:   Past Medical History:   Diagnosis Date     ADHD (attention deficit hyperactivity disorder)      Bipolar 1 disorder (H)      Borderline personality disorder (H)      Depression      Depressive disorder      Intellectual disability      Obesity      Syncope        PAST SURGICAL HISTORY:   Past Surgical History:   Procedure Laterality Date     APPENDECTOMY       APPENDECTOMY         Past medical history, past surgical history, medications, and allergies were reviewed with the patient.     FAMILY HISTORY:   Family History   Problem Relation Age of Onset     Diabetes Type 1 Father      " Cancer Paternal Grandfather        SOCIAL HISTORY:   Social History     Tobacco Use     Smoking status: Current Some Day Smoker     Years: 5.00     Types: Cigarettes     Smokeless tobacco: Never Used   Substance Use Topics     Alcohol use: No     Social history was reviewed with the patient.       Patient's Medications   New Prescriptions    No medications on file   Previous Medications    ACETAMINOPHEN (TYLENOL) 650 MG CR TABLET    Take 650 mg by mouth every 6 hours as needed for mild pain or fever     ALUM & MAG HYDROXIDE-SIMETHICONE (MAALOX) 200-200-20 MG/5ML SUSP SUSPENSION    Take 20 mLs by mouth daily as needed for indigestion     ARIPIPRAZOLE ER (ABILIFY MAINTENA) 400 MG EXTENDED RELEASE INJ SYRINGE    Inject 400 mg into the muscle every 28 days On the 4th of each month    CALCIUM CARBONATE (TUMS) 500 MG CHEWABLE TABLET    Take 1 chew tab by mouth 2 times daily as needed for heartburn     CHLORHEXIDINE (CHLORHEXIDINE) 0.12 % SOLUTION    Swish and spit 15 mLs in mouth 2 times daily     DOCUSATE SODIUM (COLACE) 100 MG CAPSULE    Take 100 mg by mouth 2 times daily     HYDROXYZINE (ATARAX) 25 MG TABLET    Take 50 mg by mouth every 8 hours as needed for itching or anxiety     IBUPROFEN (ADVIL/MOTRIN) 400 MG TABLET    Take 400 mg by mouth 3 times daily as needed for moderate pain    LITHIUM (ESKALITH) 150 MG CAPSULE    Take 3 capsules (450 mg) by mouth At Bedtime    NALTREXONE (DEPADE/REVIA) 50 MG TABLET    Take 50 mg by mouth At Bedtime    NORGESTIMATE-ETHINYL ESTRADIOL (SPRINTEC 28) 0.25-35 MG-MCG TABLET    Take 1 tablet by mouth daily    OMEPRAZOLE (PRILOSEC) 40 MG DR CAPSULE    Take 40 mg by mouth daily before breakfast    POLYETHYLENE GLYCOL (MIRALAX) 17 G PACKET    Take 1 packet by mouth daily    TRAZODONE (DESYREL) 50 MG TABLET    Take 50 mg by mouth At Bedtime    VENLAFAXINE (EFFEXOR-XR) 150 MG 24 HR CAPSULE    Take 2 capsules (300 mg) by mouth daily    VITAMIN D, CHOLECALCIFEROL, 25 MCG (1000 UT) TABS     Take 1,000 Units by mouth daily    Modified Medications    No medications on file   Discontinued Medications    No medications on file          Allergies   Allergen Reactions     Penicillins Rash and Unknown        Review of Systems   Constitutional: Negative.    HENT: Negative.    Respiratory: Negative.    Cardiovascular: Negative.    Gastrointestinal: Negative.    Genitourinary: Negative.    Musculoskeletal: Negative.    Skin: Negative.    Neurological: Negative.    Psychiatric/Behavioral: Positive for decreased concentration and suicidal ideas. Negative for hallucinations. The patient is nervous/anxious. The patient is not hyperactive.    All other systems reviewed and are negative.        Physical Exam   BP: 135/61  Pulse: 71  Temp: 97.9  F (36.6  C)  Resp: 16  SpO2: 100 %      Physical Exam  Vitals and nursing note reviewed.   HENT:      Head: Normocephalic.   Eyes:      Pupils: Pupils are equal, round, and reactive to light.   Pulmonary:      Effort: Pulmonary effort is normal.   Musculoskeletal:         General: Normal range of motion.      Cervical back: Normal range of motion.   Neurological:      General: No focal deficit present.      Mental Status: She is alert.   Psychiatric:         Attention and Perception: Attention and perception normal. She does not perceive auditory or visual hallucinations.         Mood and Affect: Mood and affect normal.         Speech: Speech normal.         Behavior: Behavior normal. Behavior is not agitated, aggressive, hyperactive or combative. Behavior is cooperative.         Thought Content: Thought content normal. Thought content is not paranoid or delusional. Thought content does not include homicidal or suicidal ideation.         Cognition and Memory: Cognition and memory normal.         Judgment: Judgment normal.         ED Course        Procedures              No results found for this or any previous visit (from the past 24 hour(s)).  Medications - No data to display           Assessments & Plan (with Medical Decision Making)   Patient with history of intellectual disability, depression and anxiety and borderline personality disorder who admits to feeling anxious at her follow-up visit to her clinic and she fainted. She has chronic underlying SI and got sent here. She now feels better and would like to return to her group home. She is well-known to us from her previous visits and appears at baseline. She can be discharged back to her group home per her request. There is no imminent danger that requires urgent intervention nor holding her here against her will. She is recommended to follow-up established care and services.    I have reviewed the nursing notes.    I have reviewed the findings, diagnosis, plan and need for follow up with the patient.    New Prescriptions    No medications on file       Final diagnoses:   Acute reaction to stress   Anxiety       I, Larisa Grant, am serving as a trained medical scribe to document services personally performed by  MD based on the provider's statements to me on August 2, 2021.  This document has been checked and approved by the attending provider.    IMagno MD, was physically present and have reviewed and verified the accuracy of this note documented by Larisa Grant, medical scribe.           8/2/2021   [facility] EMERGENCY DEPARTMENT     Magno Sena MD  08/02/21 1257

## 2021-08-03 ENCOUNTER — HOSPITAL ENCOUNTER (EMERGENCY)
Facility: CLINIC | Age: 21
Discharge: HOME | End: 2021-08-03
Attending: PSYCHIATRY & NEUROLOGY
Payer: MEDICARE

## 2021-08-03 ENCOUNTER — HOSPITAL ENCOUNTER (EMERGENCY)
Facility: CLINIC | Age: 21
Discharge: NURSING, REHAB OR LONG-TERM CARE | End: 2021-08-03
Attending: EMERGENCY MEDICINE | Admitting: EMERGENCY MEDICINE
Payer: MEDICARE

## 2021-08-03 VITALS
WEIGHT: 221 LBS | HEART RATE: 85 BPM | TEMPERATURE: 97.8 F | RESPIRATION RATE: 16 BRPM | BODY MASS INDEX: 37.93 KG/M2 | SYSTOLIC BLOOD PRESSURE: 121 MMHG | DIASTOLIC BLOOD PRESSURE: 82 MMHG | OXYGEN SATURATION: 97 %

## 2021-08-03 VITALS
RESPIRATION RATE: 16 BRPM | DIASTOLIC BLOOD PRESSURE: 71 MMHG | HEART RATE: 74 BPM | TEMPERATURE: 98.2 F | OXYGEN SATURATION: 98 % | SYSTOLIC BLOOD PRESSURE: 133 MMHG

## 2021-08-03 DIAGNOSIS — R45.851 SUICIDAL IDEATION: ICD-10-CM

## 2021-08-03 DIAGNOSIS — F32.9 MAJOR DEPRESSIVE DISORDER, SINGLE EPISODE, UNSPECIFIED: ICD-10-CM

## 2021-08-03 DIAGNOSIS — F79 INTELLECTUAL DISABILITY: ICD-10-CM

## 2021-08-03 DIAGNOSIS — F60.3 BORDERLINE PERSONALITY DISORDER (H): ICD-10-CM

## 2021-08-03 PROCEDURE — 80307 DRUG TEST PRSMV CHEM ANLYZR: CPT | Performed by: EMERGENCY MEDICINE

## 2021-08-03 PROCEDURE — 99284 EMERGENCY DEPT VISIT MOD MDM: CPT | Performed by: PSYCHIATRY & NEUROLOGY

## 2021-08-03 PROCEDURE — 90791 PSYCH DIAGNOSTIC EVALUATION: CPT

## 2021-08-03 PROCEDURE — 99285 EMERGENCY DEPT VISIT HI MDM: CPT | Mod: 25

## 2021-08-03 PROCEDURE — 250N000013 HC RX MED GY IP 250 OP 250 PS 637: Performed by: PSYCHIATRY & NEUROLOGY

## 2021-08-03 PROCEDURE — 99285 EMERGENCY DEPT VISIT HI MDM: CPT | Mod: 25 | Performed by: PSYCHIATRY & NEUROLOGY

## 2021-08-03 PROCEDURE — 81025 URINE PREGNANCY TEST: CPT | Performed by: EMERGENCY MEDICINE

## 2021-08-03 RX ORDER — OLANZAPINE 10 MG/1
10 TABLET, ORALLY DISINTEGRATING ORAL ONCE
Status: COMPLETED | OUTPATIENT
Start: 2021-08-03 | End: 2021-08-03

## 2021-08-03 RX ADMIN — OLANZAPINE 10 MG: 10 TABLET, ORALLY DISINTEGRATING ORAL at 19:49

## 2021-08-03 ASSESSMENT — ENCOUNTER SYMPTOMS
DIFFICULTY URINATING: 0
NERVOUS/ANXIOUS: 0
FEVER: 0
HEADACHES: 0
CHILLS: 0
NECK PAIN: 0
HYPERACTIVE: 0
CONSTITUTIONAL NEGATIVE: 1
AGITATION: 1
DECREASED CONCENTRATION: 1
GASTROINTESTINAL NEGATIVE: 1
MUSCULOSKELETAL NEGATIVE: 1
NEUROLOGICAL NEGATIVE: 1
VOMITING: 0
NAUSEA: 0
HALLUCINATIONS: 0
SHORTNESS OF BREATH: 0
BACK PAIN: 0
EYES NEGATIVE: 1
RESPIRATORY NEGATIVE: 1
CARDIOVASCULAR NEGATIVE: 1
ABDOMINAL PAIN: 0
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
## 2021-08-03 NOTE — ED NOTES
Bed: HW02  Expected date: 8/3/21  Expected time: 6:44 PM  Means of arrival: Ambulance  Comments:  [id number] 20 SI

## 2021-08-03 NOTE — ED NOTES
Spoke to [facility] who stated would like to have a ride set up for pt around 1000 to bring patient home. Patient has a care conference at [facility] at 1200 and a phone meeting with psych at 1300. [facility] states patient was trying to avoid these meetings yesterday. RN will set up ride and inform patient.

## 2021-08-03 NOTE — ED PROVIDER NOTES
ED Provider Note  [facility]      History     Chief Complaint   Patient presents with     Anxiety     Pt here due to having continued problems with  staff.  Pt was seen here at [facility] yesterday.     Suicidal     plan to bite self     HPI  Sadaf Ross is a 20 year old female with history of borderline personality disorder, depression, ADHD, intellectual disability, presents from group home with complaint of suicidal ideations.  States that she has been arguing with staff and coresidents recently.  She is now suicidal with plan to stab herself.  If she does not have a sharp object she will bite herself.  Symptoms have been progressively worsening over the past several weeks.  She denies any homicidal ideations, hallucinations, drug/alcohol use, abdominal pain, fever/chills, and has no other medical complaints.  Patient is well-known to the ED, has had multiple presentations for similar symptoms.  Was seen for similar symptoms yesterday, but ultimately requested to be discharged back to group home after a short stay in the ED.  She notes that her symptoms have been worsening since discharge.    Past Medical History  Past Medical History:   Diagnosis Date     ADHD (attention deficit hyperactivity disorder)      Bipolar 1 disorder (H)      Borderline personality disorder (H)      Depression      Depressive disorder      Intellectual disability      Obesity      Syncope      Past Surgical History:   Procedure Laterality Date     APPENDECTOMY       APPENDECTOMY       ARIPiprazole ER (ABILIFY MAINTENA) 400 MG extended release inj syringe  calcium carbonate (TUMS) 500 MG chewable tablet  hydrOXYzine (ATARAX) 25 MG tablet  lithium (ESKALITH) 150 MG capsule  naltrexone (DEPADE/REVIA) 50 MG tablet  omeprazole (PRILOSEC) 40 MG DR capsule  traZODone (DESYREL) 50 MG tablet  venlafaxine (EFFEXOR-XR) 150 MG 24 hr capsule  Vitamin D, Cholecalciferol, 25 MCG (1000 UT) TABS  acetaminophen (TYLENOL) 650  MG CR tablet  alum & mag hydroxide-simethicone (MAALOX) 200-200-20 MG/5ML SUSP suspension  chlorhexidine (CHLORHEXIDINE) 0.12 % solution  docusate sodium (COLACE) 100 MG capsule  ibuprofen (ADVIL/MOTRIN) 400 MG tablet  norgestimate-ethinyl estradiol (SPRINTEC 28) 0.25-35 MG-MCG tablet  polyethylene glycol (MIRALAX) 17 g packet      Allergies   Allergen Reactions     Penicillins Rash and Unknown     Family History  Family History   Problem Relation Age of Onset     Diabetes Type 1 Father      Cancer Paternal Grandfather      Social History   Social History     Tobacco Use     Smoking status: Current Some Day Smoker     Years: 5.00     Types: Cigarettes     Smokeless tobacco: Never Used   Substance Use Topics     Alcohol use: No     Drug use: No      Past medical history, past surgical history, medications, allergies, family history, and social history were reviewed with the patient. No additional pertinent items.       Review of Systems   Constitutional: Negative for chills and fever.   Respiratory: Negative for shortness of breath.    Cardiovascular: Negative for chest pain.   Gastrointestinal: Negative for abdominal pain, nausea and vomiting.   Genitourinary: Negative for difficulty urinating.   Musculoskeletal: Negative for back pain and neck pain.   Neurological: Negative for headaches.   Psychiatric/Behavioral: Positive for agitation, behavioral problems and suicidal ideas. The patient is not nervous/anxious.      A complete review of systems was performed with pertinent positives and negatives noted in the HPI, and all other systems negative.    Physical Exam   BP: 123/76  Pulse: 80  Temp: 97.7  F (36.5  C)  Resp: 16  Weight: 100.2 kg (221 lb)  SpO2: 99 %  Physical Exam  Vitals and nursing note reviewed.   Constitutional:       General: She is not in acute distress.     Appearance: Normal appearance.   HENT:      Head: Normocephalic.      Nose: Nose normal.   Eyes:      Pupils: Pupils are equal, round, and  reactive to light.   Cardiovascular:      Rate and Rhythm: Normal rate and regular rhythm.   Pulmonary:      Effort: Pulmonary effort is normal.   Abdominal:      General: There is no distension.   Musculoskeletal:         General: No deformity. Normal range of motion.      Cervical back: Normal range of motion.   Skin:     General: Skin is warm.   Neurological:      Mental Status: She is alert and oriented to person, place, and time.   Psychiatric:         Attention and Perception: Attention normal.         Mood and Affect: Mood is anxious. Affect is angry.         Speech: Speech normal.         Behavior: Behavior is agitated. Behavior is cooperative.         Thought Content: Thought content includes suicidal ideation. Thought content includes suicidal plan.         Judgment: Judgment is inappropriate.         ED Course      Procedures       The medical record was reviewed and interpreted.       Results for orders placed or performed during the hospital encounter of 08/03/21   Drug abuse screen 6 urine (tox)     Status: Normal   Result Value Ref Range    Amphetamines Urine Screen Negative Screen Negative    Barbiturates Urine Screen Negative Screen Negative    Benzodiazepines Urine Screen Negative Screen Negative    Cannabinoids Urine Screen Negative Screen Negative    Cocaine Urine Screen Negative Screen Negative    Ethanol Urine Screen Negative Screen Negative    Opiates Urine Screen Negative Screen Negative     Medications - No data to display     Assessments & Plan (with Medical Decision Making)   Patient is well-known to the ED presents with worsening suicidal ideation in context of argument with group home staff/coresidents.  Has plan to cut herself with a knife or bite herself.  Patient did attempt to bite herself while in the ED, but did not break the skin.  She will be continued on one-to-one observation.  U tox is negative.  Patient without signs of intoxication, clinical toxidrome, external trauma, or  any other abnormality.  Will board in the ED until able to undergo mental health evaluation.  Will sign out to morning provider to follow-up on psychiatry recs and disposition accordingly.    Update: Patient eval by the mental health team.  She has chronic suicidality and they feel that she is  her baseline.  Patient admits that she does not feel any safer in the hospital than at the group home.  She feels comfortable going home.  She has appointment with her psychiatrist today.  Will be discharged in good condition with outpatient resources.    I have reviewed the nursing notes. I have reviewed the findings, diagnosis, plan and need for follow up with the patient.    New Prescriptions    No medications on file       Final diagnoses:   Suicidal ideation       --  Ghassan Browning DO  [facility] EMERGENCY DEPARTMENT  8/3/2021     Ghassan Browning DO  08/03/21 0537       Ghassan Browning,   08/03/21 0651

## 2021-08-03 NOTE — ED NOTES
Pt picked up via transportation plus taxi. Patient's health MA to pay for taxi. Group home stated this is how to send patient home

## 2021-08-03 NOTE — ED TRIAGE NOTES
"Patient presents with suicidal thoughts and homicidal thoughts towards roommate at the group home patient resides. Patient reports being upset due to meeting today where they told patient she would need a legal guardian if she keeps going into the emergency department. Patient wanted the police to bring her to the ER because she is refusing to have a legal guardian. Patient says \"I would rather live on the street then have a legal guardian.  "

## 2021-08-03 NOTE — ED TRIAGE NOTES
"Pt BIBA due to \"Not feeling safe at home.\"  Pt lives in a  and was seen here at [facility] yesterday.  " Problem: Falls - Risk of:  Goal: Will remain free from falls  Description: Will remain free from falls  2/8/2021 1829 by Lear Dancer, RN  Outcome: Ongoing  2/8/2021 1828 by Lear Dancer, RN  Outcome: Ongoing  Goal: Absence of physical injury  Description: Absence of physical injury  2/8/2021 1829 by Lear Dancer, RN  Outcome: Ongoing  2/8/2021 1828 by Lear Dancer, RN  Outcome: Ongoing

## 2021-08-03 NOTE — DISCHARGE INSTRUCTIONS
Please make an appointment to follow up with your therapist and/or Psychiatric Clinic (phone: [phone]) within 1-3 days.     Return to the ED if you are having worsening thoughts/feelings of harming yourself or others, or any urgent/life-threatening concerns.     DISCHARGE RESOURCES:  -[facility] [phone]   -[facility] Behavioral Intake [phone] or [phone].  -Crisis Intervention: 527.278.4647 or 676-538-2016 (TTY: [phone]).  Call anytime.  -Suicide Awareness Voices of Education (SAVE) (www.save.org): 067-359-SRZZ (9238)  -National Suicide Prevention Line (www.mentalhealthmn.org): 047-621-FBDY (0874)  -National [place] on Mental Illness (www.[place].celine.org): 651.350.9193 or 220-254-8991.  -Rvzh9igda: text the word LIFE to 71500 for immediate support and crisis intervention  -Mental Health Consumer/Survivor Network of [place] (www.mhcsn.net): 330.422.4416 or 740-246-3388  -Mental Health Association of [place] (www.mentalhealth.org): 426.141.7612 or 783-784-8914

## 2021-08-03 NOTE — ED NOTES
"Pt stated cannot wait until 1100 to go home, or she will \"lose it\". Pt states she needs to go home sooner so that she can sleep. RN will contact group home about discharge plans.  "

## 2021-08-03 NOTE — ED NOTES
If I am feeling unsafe or I am in a crisis, I will:  Contact my established care providers   Call the National Suicide Prevention Lifeline: 614.779.5217   Go to the nearest emergency room   Call 325          Warning signs that I or other people might notice when a crisis is developing for me: Lack of sleep, lack of eating, lack of drinking fluids.    Things I am able to do on my own to cope or help me feel better: Listen to music    Things that I am able to do with others to cope or help me better: Talk    Things I can use or do for distraction: Listen to music, talk to family    Changes I can make to support my mental health and wellness: Ask for guidance from providers    People in my life that I can ask for help: Providers    Your [facility] has a mental health crisis team you can call 24/7:    Other things that are important when I m in crisis: Try calm.com and calmThe A-Team Clubhousem.com    Additional resources and information:

## 2021-08-04 ENCOUNTER — NURSE TRIAGE (OUTPATIENT)
Dept: NURSING | Facility: CLINIC | Age: 21
End: 2021-08-04

## 2021-08-04 NOTE — DISCHARGE INSTRUCTIONS
Please work with [facility] on support and resilience in the community  Follow-up established care and services    If I am feeling unsafe or I am in a crisis, I will:   Contact my established care providers   Call the National Suicide Prevention Lifeline: 826.814.9882   Go to the nearest emergency room   Call 911     Warning signs that I or other people might notice when a crisis is developing for me: when I start feeling annoyed or angry with my roommates, not drinking or eating enough, lack of sleep, arguments with others, thoughts of biting or cutting myself, or that I want to die    Things I am able to do on my own to cope or help me feel better: listen to calm music, going to my room and shutting the door, smoking a cigarette, asking a staff member to talk with me     Things that I am able to do with others to cope or help me better: talk, go for a walk     Things I can use or do for distraction: call my grandma, watch a show, talk to staff       Changes I can make to support my mental health and wellness: Calling the crisis team for support, learn some new breathing exercises to help calm down, talk with my therapist about other ways of coping with overwhelming emotions     People in my life that I can ask for help: mom, grandma, group home staff, therapist, Social Workers     Your [facility] has a mental health crisis team you can call 24/7: [facility] Adult, [phone]    Other things that are important when I m in crisis: Practice my coping skills before calling 911, knowing that I can calm down without coming back to the hospital     Additional resources and information: try the kervin My3 or Calm Harm, call the Warm Line for peer support when you're upset (see below).     Crisis Lines  Crisis Text Line  Text 728481  You will be connected with a trained live crisis counselor to provide support.    Gambling Hotline  1.800.333.hope [4673]    National Hope Line  1.800.SUICIDE [5018358]    National  "Suicide Prevention Lifeline  Free and confidential support  1.800.213.TALK [9288]  http://suicidepreventionlifeline.org    The Mikey Project (LGBTQ Youth Crisis Line)  3.776.276.3908  text START to 081-422    's Crisis Line  3.698.229.0625 (Press 1)  or text 161202    [facility] Mental Health Crisis Response  Within [place], call **CRISIS [**433675] to be connected to a mental health professional who can assist you.      [facility] Crisis  [phone]    [facility] Mobile Crisis  [phone]    [facility] Crisis  [phone]    [facility] Mobile Crisis  [phone] (adults)  [phone] (children)    [facility] Crisis  [phone] (adults)  [phone] (children)    [facility] Mobile Crisis  [phone]    [facility] Mobile Crisis  [phone]    Community Resources  Fast Tracker  Linking people to mental health and substance use disorder resources  fasttrackermn.org     [place] Mental Health Warm Line  Peer to peer support  Monday thru Saturday, 12 pm to 10 pm  045.664.7193 or 8.723.288.3532  Text \"Support\" to 24139    National [place] on Mental Illness (MAGY)  571.768.1781 or 1.888.MAGY.HELPS    [facility] Urgent Care for Adult Mental Health  [facility] residents only   [address]  [phone]    Walk-in Counseling Center  Free mental health counseling  [address]  [phone]    Mental Health Apps  My3  https://myLLUSTREpp.org/    VirtualHopeBox  https://Onarbor.org/apps/virtual-hope-box/    Suicide Safety Plan (CNG-One)    Calm Harm       "

## 2021-08-04 NOTE — ED NOTES
"[facility] Brief Assessment    Sadaf Ross was discharged from the ED earlier today and returned to the [facility] ED. See the initial consult note from the previous encounter for full assessment details.     Brief summary of prior encounter: Patient presented to the ED via medics from group home with concerns for suicidal ideation. She described her suicidal ideation as baseline, has an established outpatient mental health support system in place, and was able to contract for safety, therefore she was discharged back to her group home.     The current encounter is her th ED visit within the last month.     Circumstances that led to the patient returning the same day: Patient states that she became upset after learning that a friend wanted to have sex with a roommate and the roommate did not share the same interest. She slammed her door then walked to the park. She states that she called the  to be brought in as she was having thoughts of cutting, burning or biting herself, which she reported wanting to do so she could \"feel better.\" She did not act on the thoughts and instead smoked a cigarette. She reports that the suicidal ideation onset about a week ago and is triggered by wanting to avoid \"listening to the drama and nonsense.\" She states that she has had \"too many to count\" suicide attempts, and when asked for details of these attempts she reports that she once held a knife to her neck, but her mother took it away from her before she could hurt herself. She denies current suicidal ideation, reports feeling \"exhausted\" and states that she would like to go home to go to bed. She also shares that she hasn't slept in three days, because she is afraid that she \"won't wake up the next day.\" She disclosed that her father  in his sleep on 2021. She states that she wants to \"keep living\" for her mom, sisters, grandma and uncle. She was able to engage in safety planning again, and was receptive to writer's " suggestion of utilizing [facility] crisis services when not in imminent danger.     Patient denies any homicidal ideation and denies history of violent or aggressive behaviors. She reports smoking weed with her mom a couple of days ago and bishop any other recent substance use.     Current risk to self or others? No    ESS-6  1.a. Over the past 2 weeks, have you had thoughts of killing yourself? Yes   1.b. Have you ever attempted to kill yourself and, if yes, when did this last happen? No  2. Recent or current suicide plan? No  3. Recent or current intent to act on ideation? No  4. Lifetime psychiatric hospitalization? Yes  5. Pattern of excessive substance use? No  6. Current irritability, agitation, or aggression? Yes  ESS-6 Score: 3/6     Changes in Mental Status: No    Additional collateral information: Writer spoke with [facility] staff, Arlene ([phone]) to obtain collateral. Arlene reports that patient was perfectly okay in her meeting today, told her SW that she was going to stop going to the hospital every day, and had a phone session with her therapist. This afternoon she had an argument with a roommate about a friend coming to the house. GF staff went to talk through it with her, which was the plan identified with the SW, then she left and was found sitting at the park, where she told staff she called 911 and needed to go to the ED to calm herself down. She reported that she might cut or bite herself. There are no safety concerns. Staff states that there are no sharps, medications are locked and there are no guns in the home. She did not express any homicidal ideation, nor was she violent or aggressive. There are no current concerns regarding substances, however, there is a history of alcohol and marijuana use.     Clinical Impressions:     ICD-10-CM    1. Borderline personality disorder (H)  F60.3    2. Intellectual disability  F79       Disposition Recommendations and Rationale: Patient is a 20 year old  female with history of intellectual disability, bipolar disorder, ADHD, borderline personality disorder, depression and anxiety who presents to the ED via medics for the second time today for evaluation of suicidal ideation. This marks her 19th ED over the last month. She denied suicidal ideation while in the ED and stated she wanted to discharge home to sleep. Her group home staff denied safety concerns or issues with her returning home tonight, stating they have 24/7 staffing, but would not be able to provide transportation. Patient was future-oriented and able to contract for safety. She endorsed fear of dying, sense of responsibility to family, and rapport with current providers.      At present patient does not appear to be at imminent risk of harm to self or others and there is no indication of benefit to psychiatric hospitalization. It is recommended that patient return to her group home and follow up with her established providers.     Reviewed assessment with attending provider: Dr. Sena, who supports recommendations and plans for discharge home with resumption of previously established care.       If I am feeling unsafe or I am in a crisis, I will:   Contact my established care providers   Call the National Suicide Prevention Lifeline: 546.984.8441   Go to the nearest emergency room   Call 964      Warning signs that I or other people might notice when a crisis is developing for me: when I start feeling annoyed or angry with my roommates, not drinking or eating enough, lack of sleep, arguments with others, thoughts of biting or cutting myself, or that I want to die     Things I am able to do on my own to cope or help me feel better: listen to calm music, going to my room and shutting the door, smoking a cigarette, asking a staff member to talk with me      Things that I am able to do with others to cope or help me better: talk, go for a walk      Things I can use or do for distraction: call my grandma,  watch a show, talk to staff        Changes I can make to support my mental health and wellness: Calling the crisis team for support, learn some new breathing exercises to help calm down, talk with my therapist about other ways of coping with overwhelming emotions      People in my life that I can ask for help: mom, grandma, group home staff, therapist, Social Workers      Your [facility] has a mental health crisis team you can call 24/7: [facility] Adult, [phone]     Other things that are important when I m in crisis: Practice my coping skills before calling 911, knowing that I can calm down without coming back to the hospital      Additional resources and information: try the kervin My3 or Calm Harm, call the Warm Line for peer support when you're upset (see below).     Time spent: 50 min; CPT code: 77141      YOSHI Mazariegos

## 2021-08-04 NOTE — ED PROVIDER NOTES
ED Provider Note  [facility]      History     Chief Complaint   Patient presents with     Suicidal     Patient presents with suicidal thoughts and thoughts of self harm. Patient had a meeting today when there was talk about a legal guardian for patient that did not go well. Patient plans to hang, bite, or cut herself. Patient has not been taking medications for several weeks according to staff.      HPI  Sadaf Ross is a 20 year old female who is here via EMS from her group home. This is her 3rd ED visit here in past 24 hours. She has very poor coping skills and limited frustration tolerance, usually calling 911 to be brought to the ED when stressed or overwhelmed and upset. Patient was discharged home this morning and group [place] reports an uneventful morning and afternoon until her roommate told patient that she wanted to be with her boyfriend and have sex in their room. Patient grew upset, tried talking to staff who encouraged her to use some measure of coping skill, and she decided to walk to the park. She had then called 911 and reported that she was feeling suicidal. Here she was given a dose of Zyprexa and now feels tired. She reports not sleeping pats 3 days. She appears ready to sleep here and is ready to go home. She no longer feels suicidal.    Please see [facility] Crisis Assessment on 8/3/21 in Epic for further details.    PERSONAL MEDICAL HISTORY  Past Medical History:   Diagnosis Date     ADHD (attention deficit hyperactivity disorder)      Bipolar 1 disorder (H)      Borderline personality disorder (H)      Depression      Depressive disorder      Intellectual disability      Obesity      Syncope      PAST SURGICAL HISTORY  Past Surgical History:   Procedure Laterality Date     APPENDECTOMY       APPENDECTOMY       FAMILY HISTORY  Family History   Problem Relation Age of Onset     Diabetes Type 1 Father      Cancer Paternal Grandfather      SOCIAL HISTORY  Social History      Tobacco Use     Smoking status: Current Some Day Smoker     Years: 5.00     Types: Cigarettes     Smokeless tobacco: Never Used   Substance Use Topics     Alcohol use: No     MEDICATIONS  No current facility-administered medications for this encounter.     Current Outpatient Medications   Medication     acetaminophen (TYLENOL) 650 MG CR tablet     alum & mag hydroxide-simethicone (MAALOX) 200-200-20 MG/5ML SUSP suspension     ARIPiprazole ER (ABILIFY MAINTENA) 400 MG extended release inj syringe     calcium carbonate (TUMS) 500 MG chewable tablet     chlorhexidine (CHLORHEXIDINE) 0.12 % solution     docusate sodium (COLACE) 100 MG capsule     hydrOXYzine (ATARAX) 25 MG tablet     ibuprofen (ADVIL/MOTRIN) 400 MG tablet     lithium (ESKALITH) 150 MG capsule     naltrexone (DEPADE/REVIA) 50 MG tablet     norgestimate-ethinyl estradiol (SPRINTEC 28) 0.25-35 MG-MCG tablet     omeprazole (PRILOSEC) 40 MG DR capsule     polyethylene glycol (MIRALAX) 17 g packet     traZODone (DESYREL) 50 MG tablet     venlafaxine (EFFEXOR-XR) 150 MG 24 hr capsule     Vitamin D, Cholecalciferol, 25 MCG (1000 UT) TABS     ALLERGIES  Allergies   Allergen Reactions     Penicillins Rash and Unknown          Review of Systems   Constitutional: Negative.    HENT: Negative.    Eyes: Negative.    Respiratory: Negative.    Cardiovascular: Negative.    Gastrointestinal: Negative.    Genitourinary: Negative.    Musculoskeletal: Negative.    Skin: Negative.    Neurological: Negative.    Psychiatric/Behavioral: Positive for behavioral problems, decreased concentration, self-injury and suicidal ideas. Negative for hallucinations. The patient is not hyperactive.    All other systems reviewed and are negative.        Physical Exam   BP: 119/82  Pulse: 76  Temp: 98.2  F (36.8  C)  Resp: 18  SpO2: 99 %  Physical Exam  Vitals and nursing note reviewed.   HENT:      Head: Normocephalic.   Eyes:      Pupils: Pupils are equal, round, and reactive to light.    Pulmonary:      Effort: Pulmonary effort is normal.   Musculoskeletal:         General: Normal range of motion.      Cervical back: Normal range of motion.   Neurological:      General: No focal deficit present.      Mental Status: She is alert.   Psychiatric:         Attention and Perception: Attention and perception normal. She does not perceive auditory or visual hallucinations.         Mood and Affect: Mood and affect normal.         Speech: Speech normal.         Behavior: Behavior normal. Behavior is not agitated, aggressive, hyperactive or combative. Behavior is cooperative.         Thought Content: Thought content normal. Thought content is not paranoid or delusional. Thought content does not include homicidal or suicidal ideation.         Cognition and Memory: Cognition and memory normal.         Judgment: Judgment normal.         ED Course      Procedures            Results for orders placed or performed during the hospital encounter of 08/03/21   Drug abuse screen 6 urine (tox)     Status: Normal   Result Value Ref Range    Amphetamines Urine Screen Negative Screen Negative    Barbiturates Urine Screen Negative Screen Negative    Benzodiazepines Urine Screen Negative Screen Negative    Cannabinoids Urine Screen Negative Screen Negative    Cocaine Urine Screen Negative Screen Negative    Ethanol Urine Screen Negative Screen Negative    Opiates Urine Screen Negative Screen Negative     Medications   OLANZapine zydis (zyPREXA) ODT tab 10 mg (10 mg Oral Given 8/3/21 1949)        Assessments & Plan (with Medical Decision Making)   Patient with intellectual disability and borderline personality disorder who continues to struggle with her coping of stress in her life. She appears at baseline. There is no acute reason for an intervention. Patient can be discharged back to her group home. She is prepared and ready to return back to her group home. She will be transported by ambulance. She is encouraged to  follow-up established care and services.    I have reviewed the nursing notes. I have reviewed the findings, diagnosis, plan and need for follow up with the patient.    New Prescriptions    No medications on file       Final diagnoses:   Borderline personality disorder (H)   Intellectual disability       --  Magno Sena MD  [facility] EMERGENCY DEPARTMENT  8/3/2021     Magno Sena MD  08/03/21 2050

## 2021-08-05 LAB — HCG UR QL: NEGATIVE

## 2021-08-05 NOTE — TELEPHONE ENCOUNTER
"I am having chest pain, back pain, my hands are sweaty, my eyes are dilated. I also have some thoughts that I shouldn't be having. I think of biting myself. I feel paranoid. I am trying to figure out what to do. I keep going to the ER.  I talked to someone at [facility], I told them about my problems. They said they didn't know what they could do for me.\"   Patient rates chest pain a 10/10  Denies other sx   Triaged and advised ER  Nydia Mcfadden RN [facility]        Reason for Disposition    SEVERE chest pain    Protocols used: CHEST PAIN-A-AH      "

## 2021-08-09 ENCOUNTER — LAB REQUISITION (OUTPATIENT)
Dept: LAB | Facility: CLINIC | Age: 21
End: 2021-08-09
Payer: MEDICARE

## 2021-08-09 DIAGNOSIS — F41.1 GENERALIZED ANXIETY DISORDER: ICD-10-CM

## 2021-08-09 PROCEDURE — 36415 COLL VENOUS BLD VENIPUNCTURE: CPT | Mod: ORL | Performed by: INTERNAL MEDICINE

## 2021-08-09 PROCEDURE — 80178 ASSAY OF LITHIUM: CPT | Mod: ORL | Performed by: INTERNAL MEDICINE

## 2021-08-10 LAB — LITHIUM SERPL-SCNC: 0.6 MMOL/L

## 2021-09-09 ENCOUNTER — LAB REQUISITION (OUTPATIENT)
Dept: LAB | Facility: CLINIC | Age: 21
End: 2021-09-09
Payer: MEDICARE

## 2021-09-09 DIAGNOSIS — Z20.822 CONTACT WITH AND (SUSPECTED) EXPOSURE TO COVID-19: ICD-10-CM

## 2021-09-09 PROCEDURE — 87081 CULTURE SCREEN ONLY: CPT | Mod: ORL | Performed by: NURSE PRACTITIONER

## 2021-09-11 LAB — BACTERIA SPEC CULT: ABNORMAL

## 2021-09-23 ENCOUNTER — LAB REQUISITION (OUTPATIENT)
Dept: LAB | Facility: CLINIC | Age: 21
End: 2021-09-23
Payer: MEDICARE

## 2021-09-23 DIAGNOSIS — F33.2 MAJOR DEPRESSIVE DISORDER, RECURRENT SEVERE WITHOUT PSYCHOTIC FEATURES (H): ICD-10-CM
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
## 2021-09-23 DIAGNOSIS — F60.3 BORDERLINE PERSONALITY DISORDER (H): ICD-10-CM

## 2021-09-23 PROCEDURE — 87340 HEPATITIS B SURFACE AG IA: CPT | Mod: ORL | Performed by: INTERNAL MEDICINE

## 2021-09-23 PROCEDURE — 80178 ASSAY OF LITHIUM: CPT | Mod: ORL | Performed by: INTERNAL MEDICINE

## 2021-09-23 PROCEDURE — 87389 HIV-1 AG W/HIV-1&-2 AB AG IA: CPT | Mod: ORL | Performed by: INTERNAL MEDICINE

## 2021-09-23 PROCEDURE — 86803 HEPATITIS C AB TEST: CPT | Mod: ORL | Performed by: INTERNAL MEDICINE

## 2021-09-23 PROCEDURE — 87350 HEPATITIS BE AG IA: CPT | Mod: ORL | Performed by: INTERNAL MEDICINE

## 2021-09-23 PROCEDURE — 86706 HEP B SURFACE ANTIBODY: CPT | Mod: ORL | Performed by: INTERNAL MEDICINE

## 2021-09-24 LAB
HBV SURFACE AB SERPL IA-ACNC: 0.25 M[IU]/ML
HBV SURFACE AG SERPL QL IA: NONREACTIVE
HCV AB SERPL QL IA: NONREACTIVE
HIV 1+2 AB+HIV1 P24 AG SERPL QL IA: NONREACTIVE
LITHIUM SERPL-SCNC: 0.3 MMOL/L

## 2021-09-25 LAB — HBV E AG SERPL QL IA: NEGATIVE

## 2021-11-02 ENCOUNTER — TRANSFERRED RECORDS (OUTPATIENT)
Dept: HEALTH INFORMATION MANAGEMENT | Facility: CLINIC | Age: 21
End: 2021-11-02
Payer: MEDICARE

## 2021-11-11 ENCOUNTER — LAB REQUISITION (OUTPATIENT)
Dept: LAB | Facility: CLINIC | Age: 21
End: 2021-11-11
Payer: MEDICARE

## 2021-11-11 DIAGNOSIS — F33.2 MAJOR DEPRESSIVE DISORDER, RECURRENT SEVERE WITHOUT PSYCHOTIC FEATURES (H): ICD-10-CM

## 2021-11-11 LAB — LITHIUM SERPL-SCNC: <0.2 MMOL/L

## 2021-11-11 PROCEDURE — 80178 ASSAY OF LITHIUM: CPT | Mod: ORL | Performed by: INTERNAL MEDICINE

## 2021-11-16 ENCOUNTER — THERAPY VISIT (OUTPATIENT)
Dept: PHYSICAL THERAPY | Facility: CLINIC | Age: 21
End: 2021-11-16
Payer: MEDICARE

## 2021-11-16 DIAGNOSIS — M25.562 ACUTE PAIN OF LEFT KNEE: ICD-10-CM

## 2021-11-16 PROCEDURE — 97161 PT EVAL LOW COMPLEX 20 MIN: CPT | Mod: GP | Performed by: PHYSICAL THERAPIST

## 2021-11-16 PROCEDURE — 97110 THERAPEUTIC EXERCISES: CPT | Mod: GP | Performed by: PHYSICAL THERAPIST

## 2021-11-16 ASSESSMENT — ACTIVITIES OF DAILY LIVING (ADL)
WEAKNESS: THE SYMPTOM PREVENTS ME FROM ALL DAILY ACTIVITIES
KNEE_ACTIVITY_OF_DAILY_LIVING_SCORE: 62.86
GIVING WAY, BUCKLING OR SHIFTING OF KNEE: THE SYMPTOM PREVENTS ME FROM ALL DAILY ACTIVITIES
STAND: ACTIVITY IS MINIMALLY DIFFICULT
STIFFNESS: THE SYMPTOM AFFECTS MY ACTIVITY SLIGHTLY
SQUAT: ACTIVITY IS MINIMALLY DIFFICULT
GO DOWN STAIRS: ACTIVITY IS MINIMALLY DIFFICULT
PAIN: I HAVE THE SYMPTOM BUT IT DOES NOT AFFECT MY ACTIVITY
WALK: ACTIVITY IS MINIMALLY DIFFICULT
SWELLING: THE SYMPTOM AFFECTS MY ACTIVITY SEVERELY
LIMPING: I HAVE THE SYMPTOM BUT IT DOES NOT AFFECT MY ACTIVITY
SIT WITH YOUR KNEE BENT: ACTIVITY IS MINIMALLY DIFFICULT
KNEE_ACTIVITY_OF_DAILY_LIVING_SUM: 44
GO UP STAIRS: ACTIVITY IS MINIMALLY DIFFICULT
RAW_SCORE: 44
KNEEL ON THE FRONT OF YOUR KNEE: ACTIVITY IS MINIMALLY DIFFICULT
RISE FROM A CHAIR: ACTIVITY IS MINIMALLY DIFFICULT

## 2021-11-16 NOTE — PROGRESS NOTES
Physical Therapy Initial Evaluation  Subjective:  The history is provided by the patient.   Therapist Generated HPI Evaluation  Problem details: Woke up with pain a few weeks ago.  Knee was swollen.  MD referral 9/28/2021..         Type of problem:  Left knee.    This is a new condition.  Condition occurred with:  Insidious onset.  Where condition occurred: for unknown reasons.  Patient reports pain:  Anterior and sub patellar.  Pain is described as aching and is constant.  Pain radiates to:  Lower leg. Pain is the same all the time.  Since onset symptoms are unchanged.  Associated symptoms:  Loss of motion/stiffness, catching and buckling/giving out. Symptoms are exacerbated by walking, ascending stairs, descending stairs and activity  and relieved by bracing/immobilizing.          Patient Health History  Sadaf Ross being seen for knee.       Problem occurred: woke up with pain   Pain is reported as 9/10 on pain scale.  General health as reported by patient is good.  Pertinent medical history includes: depression, overweight, seizures and smoking.   Red flags:  None as reported by patient.   Other medical allergies details: penicillin.   Surgeries include:  None.    Current medications:  Pain medication and sleep medication.    Current occupation is none.      Other job/home tasks details: lives in group home.                                  Objective:  System                                                Knee Evaluation:  ROM:    AROM    Hyperextension: Left:  5    Right:    Flexion: Left: 128   Right:        Strength:     Extension:  Left: 4+/5    Pain:+/-        Flexion:  Left: 5/5    Pain:-        Quad Set Left: Good    Pain:   Quad Set Right: Good    Pain:  Ligament Testing:  Normal                Special Tests:   Left knee positive for the following special tests:  Meniscal (medial) and Patellar Compression    Palpation:    Left knee tenderness present at:  Medial Joint Line; Lateral Joint Line and  Patellar Tendon    Edema:  Normal    Mobility Testing:  Normal                  General     ROS    Assessment/Plan:    Patient is a 21 year old female with left side knee complaints.    Patient has the following significant findings with corresponding treatment plan.                Diagnosis 1:  L knee pain  Pain -  self management, education, directional preference exercise and home program  Decreased strength - therapeutic exercise and therapeutic activities  Impaired muscle performance - neuro re-education  Decreased function - therapeutic activities    Therapy Evaluation Codes:   1) History comprised of:   Personal factors that impact the plan of care:      None.    Comorbidity factors that impact the plan of care are:      Depression, Overweight, Seizures and Smoking.     Medications impacting care: Pain and Sleep.  2) Examination of Body Systems comprised of:   Body structures and functions that impact the plan of care:      Knee.   Activity limitations that impact the plan of care are:      Squatting/kneeling, Stairs and Walking.  3) Clinical presentation characteristics are:   Stable/Uncomplicated.  4) Decision-Making    Low complexity using standardized patient assessment instrument and/or measureable assessment of functional outcome.  Cumulative Therapy Evaluation is: Low complexity.    Previous and current functional limitations:  (See Goal Flow Sheet for this information)    Short term and Long term goals: (See Goal Flow Sheet for this information)     Communication ability:  Patient appears to be able to clearly communicate and understand verbal and written communication and follow directions correctly.  Treatment Explanation - The following has been discussed with th6e patient:   RX ordered/plan of care  Anticipated outcomes  Possible risks and side effects  This patient would benefit from PT intervention to resume normal activities.   Rehab potential is good.    Frequency:  1 X week, once  daily  Duration:  for 6 weeks  Discharge Plan:  Achieve all LTG.  Independent in home treatment program.  Reach maximal therapeutic benefit.    Please refer to the daily flowsheet for treatment today, total treatment time and time spent performing 1:1 timed codes.

## 2021-11-17 ENCOUNTER — NURSE TRIAGE (OUTPATIENT)
Dept: NURSING | Facility: CLINIC | Age: 21
End: 2021-11-17
Payer: MEDICARE

## 2021-11-17 NOTE — TELEPHONE ENCOUNTER
Patient is calling and states that she was recently in the hospital for abdominal pain.  Since home is very sweaty and sleeping a lot more than normal.  Patient has chills and shakiness and sweaty and dizziness and lightheadedness.  Patient is urinating a lot and more frequent than normal.      COVID 19 Nurse Triage Plan/Patient Instructions    Please be aware that novel coronavirus (COVID-19) may be circulating in the community. If you develop symptoms such as fever, cough, or SOB or if you have concerns about the presence of another infection including coronavirus (COVID-19), please contact your health care provider or visit [email or web address].     Disposition/Instructions    In-Person Visit with provider recommended. Reference Visit Selection Guide.    Thank you for taking steps to prevent the spread of this virus.  o Limit your contact with others.  o Wear a simple mask to cover your cough.  o Wash your hands well and often.    Resources    [facility]: About COVID-19: [email or web address]    CDC: What to Do If You're Sick: www.cdc.gov/coronavirus/2019-ncov/about/steps-when-sick.html    CDC: Ending Home Isolation: www.cdc.gov/coronavirus/2019-ncov/hcp/disposition-in-home-patients.html     CDC: Caring for Someone: www.cdc.gov/coronavirus/2019-ncov/if-you-are-sick/care-for-someone.html     [facility]: Interim Guidance for Hospital Discharge to Home: www.health.[facility].mn.us/diseases/coronavirus/hcp/hospdischarge.pdf    [facility] clinical trials (COVID-19 research studies): [email or web address]     Below are the COVID-19 hotlines at the [place] Department of Health ([facility]). Interpreters are available.   o For health questions: Call 295-171-6787 or 1-218.288.7917 (7 a.m. to 7 p.m.)  o For questions about schools and childcare: Call 428-655-2024 or 1-355.255.8734 (7 a.m. to 7 p.m.)                       Reason for Disposition    Side (flank) or lower back pain  present    Additional Information    Negative: Shock suspected (e.g., cold/pale/clammy skin, too weak to stand, low BP, rapid pulse)    Negative: Sounds like a life-threatening emergency to the triager    Negative: Unable to urinate (or only a few drops) > 4 hours and bladder feels very full (e.g., palpable bladder or strong urge to urinate)    Negative: Decreased urination and drinking very little and dehydration suspected (e.g., dark urine, no urine > 12 hours, very dry mouth, very lightheaded)    Negative: Patient sounds very sick or weak to the triager    Negative: Fever > 100.4 F (38.0 C)    Protocols used: URINARY SYMPTOMS-A-OH

## 2021-11-17 NOTE — PROGRESS NOTES
[facility]    OUTPATIENT Physical Therapy ORTHOPEDIC EVALUATION  PLAN OF TREATMENT FOR OUTPATIENT REHABILITATION  (COMPLETE FOR INITIAL CLAIMS ONLY)  Patient's Last Name, First Name, M.I.  YOB: 2000  VandanaSadaf  L    Provider s Name:  [facility]   Medical Record No.  [id number]   Start of Care Date:  11/16/21   Onset Date:   09/28/21   Type:     _X__PT   ___OT Medical Diagnosis:    Encounter Diagnosis   Name Primary?     Acute pain of left knee         Treatment Diagnosis:  L knee pain        Goals:     11/16/21 0500   Body Part   Goals listed below are for L knee   Goal #1   Goal #1 ambulation   Previous Functional Level No restrictions   Current Functional Level Minutes patient can walk   Performance Level immediate pain   STG Target Performance Minutes patient will be able to walk   Performance Level 10 prior to pain   Rationale for safe household ambulation   Due Date 12/07/21    LTG Target Performance Minutes patient will be able to  walk   Performance Level 30 prior to pain   Rationale for safe household ambulation;for safe outdoor household ambulation;for safe community ambulation;to promote a healthy and active lifestyle   Due Date 12/28/21       Therapy Frequency:  1x/ week  Predicted Duration of Therapy Intervention:  6 week    Eduardo Chang, PT                 I CERTIFY THE NEED FOR THESE SERVICES FURNISHED UNDER        THIS PLAN OF TREATMENT AND WHILE UNDER MY CARE .             Physician Signature               Date    X_____________________________________________________                             Certification Date From:  11/16/21   Certification Date To:  02/13/22    Referring Provider:  Provider Not In System    Initial Assessment        See Epic Evaluation SOC Date: 11/16/21

## 2021-11-20 ENCOUNTER — NURSE TRIAGE (OUTPATIENT)
Dept: NURSING | Facility: CLINIC | Age: 21
End: 2021-11-20
Payer: MEDICARE

## 2021-11-20 NOTE — TELEPHONE ENCOUNTER
Patient called to report she is frustrated with how she is feeling . She is scheduled with provider appointment on Tuesday. Patient states she is having slurred speech which a new symptom.    Per protocol patient to call 911. Patient is worried about losing her group home currently. Patient states she has been to ED this week 3-4 times.    Daxa Garcia RN  [facility] Nurse Advisor      Reason for Disposition    Difficult to awaken or acting confused (e.g., disoriented, slurred speech)    Additional Information    Negative: Severe difficulty breathing (e.g., struggling for each breath, speaks in single words)    Negative: [1] Chest pain AND [2] difficulty breathing    Negative: Bluish (or gray) lips or face now    Negative: Passed out (i.e., lost consciousness, collapsed and was not responding)    Negative: Shock suspected (e.g., cold/pale/clammy skin, too weak to stand, low BP, rapid pulse)    Protocols used: COUGHING UP BLOOD-A-AH

## 2021-11-23 ENCOUNTER — LAB REQUISITION (OUTPATIENT)
Dept: LAB | Facility: CLINIC | Age: 21
DRG: 883 | End: 2021-11-23
Payer: MEDICARE

## 2021-11-23 DIAGNOSIS — R68.89 OTHER GENERAL SYMPTOMS AND SIGNS: ICD-10-CM

## 2021-11-23 LAB — TSH SERPL DL<=0.005 MIU/L-ACNC: 3.91 MU/L (ref 0.4–4)

## 2021-11-23 PROCEDURE — 84443 ASSAY THYROID STIM HORMONE: CPT | Mod: ORL | Performed by: PEDIATRICS

## 2021-11-24 ENCOUNTER — HOSPITAL ENCOUNTER (EMERGENCY)
Facility: CLINIC | Age: 21
Discharge: HOME | DRG: 883 | End: 2021-11-25
Attending: EMERGENCY MEDICINE | Admitting: EMERGENCY MEDICINE
Payer: MEDICARE

## 2021-11-24 DIAGNOSIS — F41.1 GENERALIZED ANXIETY DISORDER: ICD-10-CM

## 2021-11-24 DIAGNOSIS — G89.29 CHRONIC ABDOMINAL PAIN: ICD-10-CM

## 2021-11-24 DIAGNOSIS — F60.3 BORDERLINE PERSONALITY DISORDER (H): ICD-10-CM

## 2021-11-24 DIAGNOSIS — R10.9 CHRONIC ABDOMINAL PAIN: ICD-10-CM

## 2021-11-24 LAB
ALBUMIN SERPL-MCNC: 4 G/DL (ref 3.4–5)
ALP SERPL-CCNC: 50 U/L (ref 40–150)
ALT SERPL W P-5'-P-CCNC: 29 U/L (ref 0–50)
ANION GAP SERPL CALCULATED.3IONS-SCNC: 3 MMOL/L (ref 3–14)
AST SERPL W P-5'-P-CCNC: 18 U/L (ref 0–45)
BASOPHILS # BLD AUTO: 0.1 10E3/UL (ref 0–0.2)
BASOPHILS NFR BLD AUTO: 1 %
BILIRUB SERPL-MCNC: 0.3 MG/DL (ref 0.2–1.3)
BUN SERPL-MCNC: 11 MG/DL (ref 7–30)
CALCIUM SERPL-MCNC: 9.6 MG/DL (ref 8.5–10.1)
CHLORIDE BLD-SCNC: 109 MMOL/L (ref 94–109)
CO2 SERPL-SCNC: 28 MMOL/L (ref 20–32)
CREAT SERPL-MCNC: 0.71 MG/DL (ref 0.52–1.04)
EOSINOPHIL # BLD AUTO: 0.3 10E3/UL (ref 0–0.7)
EOSINOPHIL NFR BLD AUTO: 3 %
ERYTHROCYTE [DISTWIDTH] IN BLOOD BY AUTOMATED COUNT: 12.5 % (ref 10–15)
GFR SERPL CREATININE-BSD FRML MDRD: >90 ML/MIN/1.73M2
GLUCOSE BLD-MCNC: 95 MG/DL (ref 70–99)
HCT VFR BLD AUTO: 36.2 % (ref 35–47)
HGB BLD-MCNC: 12 G/DL (ref 11.7–15.7)
IMM GRANULOCYTES # BLD: 0 10E3/UL
IMM GRANULOCYTES NFR BLD: 0 %
LYMPHOCYTES # BLD AUTO: 2.8 10E3/UL (ref 0.8–5.3)
LYMPHOCYTES NFR BLD AUTO: 30 %
MCH RBC QN AUTO: 28.2 PG (ref 26.5–33)
MCHC RBC AUTO-ENTMCNC: 33.1 G/DL (ref 31.5–36.5)
MCV RBC AUTO: 85 FL (ref 78–100)
MONOCYTES # BLD AUTO: 0.4 10E3/UL (ref 0–1.3)
MONOCYTES NFR BLD AUTO: 5 %
NEUTROPHILS # BLD AUTO: 5.6 10E3/UL (ref 1.6–8.3)
NEUTROPHILS NFR BLD AUTO: 61 %
NRBC # BLD AUTO: 0 10E3/UL
NRBC BLD AUTO-RTO: 0 /100
PLATELET # BLD AUTO: 267 10E3/UL (ref 150–450)
POTASSIUM BLD-SCNC: 4.2 MMOL/L (ref 3.4–5.3)
PROT SERPL-MCNC: 7.8 G/DL (ref 6.8–8.8)
RBC # BLD AUTO: 4.26 10E6/UL (ref 3.8–5.2)
SODIUM SERPL-SCNC: 140 MMOL/L (ref 133–144)
WBC # BLD AUTO: 9.1 10E3/UL (ref 4–11)

## 2021-11-24 PROCEDURE — 90791 PSYCH DIAGNOSTIC EVALUATION: CPT

## 2021-11-24 PROCEDURE — 80053 COMPREHEN METABOLIC PANEL: CPT | Performed by: EMERGENCY MEDICINE

## 2021-11-24 PROCEDURE — 250N000013 HC RX MED GY IP 250 OP 250 PS 637

## 2021-11-24 PROCEDURE — 85025 COMPLETE CBC W/AUTO DIFF WBC: CPT | Performed by: EMERGENCY MEDICINE

## 2021-11-24 PROCEDURE — 99285 EMERGENCY DEPT VISIT HI MDM: CPT | Performed by: EMERGENCY MEDICINE

## 2021-11-24 PROCEDURE — 99285 EMERGENCY DEPT VISIT HI MDM: CPT | Mod: 25

## 2021-11-24 PROCEDURE — 36415 COLL VENOUS BLD VENIPUNCTURE: CPT | Performed by: EMERGENCY MEDICINE

## 2021-11-24 RX ORDER — OLANZAPINE 5 MG/1
5 TABLET, ORALLY DISINTEGRATING ORAL ONCE
Status: DISCONTINUED | OUTPATIENT
Start: 2021-11-24 | End: 2021-11-25

## 2021-11-24 RX ORDER — HYDROXYZINE HYDROCHLORIDE 25 MG/1
25 TABLET, FILM COATED ORAL EVERY 4 HOURS PRN
Status: DISCONTINUED | OUTPATIENT
Start: 2021-11-24 | End: 2021-11-25 | Stop reason: HOSPADM

## 2021-11-24 RX ORDER — OLANZAPINE 10 MG/1
10 TABLET, ORALLY DISINTEGRATING ORAL 2 TIMES DAILY PRN
Status: DISCONTINUED | OUTPATIENT
Start: 2021-11-24 | End: 2021-11-25

## 2021-11-24 RX ORDER — TRAZODONE HYDROCHLORIDE 50 MG/1
50 TABLET, FILM COATED ORAL AT BEDTIME
Status: DISCONTINUED | OUTPATIENT
Start: 2021-11-24 | End: 2021-11-25 | Stop reason: HOSPADM

## 2021-11-24 RX ORDER — OLANZAPINE 10 MG/1
TABLET, ORALLY DISINTEGRATING ORAL
Status: COMPLETED
Start: 2021-11-24 | End: 2021-11-24

## 2021-11-24 RX ORDER — NALTREXONE HYDROCHLORIDE 50 MG/1
50 TABLET, FILM COATED ORAL AT BEDTIME
Status: DISCONTINUED | OUTPATIENT
Start: 2021-11-25 | End: 2021-11-25 | Stop reason: HOSPADM

## 2021-11-24 RX ORDER — VENLAFAXINE HYDROCHLORIDE 150 MG/1
300 CAPSULE, EXTENDED RELEASE ORAL DAILY
Status: DISCONTINUED | OUTPATIENT
Start: 2021-11-25 | End: 2021-11-25 | Stop reason: HOSPADM

## 2021-11-24 RX ADMIN — OLANZAPINE 10 MG: 10 TABLET, ORALLY DISINTEGRATING ORAL at 21:02

## 2021-11-24 ASSESSMENT — MIFFLIN-ST. JEOR: SCORE: 1737.92

## 2021-11-25 ENCOUNTER — HOSPITAL ENCOUNTER (INPATIENT)
Facility: CLINIC | Age: 21
LOS: 7 days | Discharge: NURSING, REHAB OR LONG-TERM CARE | DRG: 883 | End: 2021-12-02
Attending: EMERGENCY MEDICINE | Admitting: PSYCHIATRY & NEUROLOGY
Payer: MEDICARE

## 2021-11-25 ENCOUNTER — TELEPHONE (OUTPATIENT)
Dept: BEHAVIORAL HEALTH | Facility: CLINIC | Age: 21
End: 2021-11-25
Payer: MEDICARE

## 2021-11-25 VITALS
SYSTOLIC BLOOD PRESSURE: 127 MMHG | BODY MASS INDEX: 37.37 KG/M2 | WEIGHT: 218.9 LBS | OXYGEN SATURATION: 98 % | HEIGHT: 64 IN | DIASTOLIC BLOOD PRESSURE: 86 MMHG | HEART RATE: 80 BPM | RESPIRATION RATE: 18 BRPM | TEMPERATURE: 98.2 F

## 2021-11-25 DIAGNOSIS — F32.2 SEVERE MAJOR DEPRESSION WITHOUT PSYCHOTIC FEATURES (H): ICD-10-CM

## 2021-11-25 DIAGNOSIS — R45.851 SUICIDAL IDEATION: ICD-10-CM

## 2021-11-25 DIAGNOSIS — F60.3 BORDERLINE PERSONALITY DISORDER (H): Chronic | ICD-10-CM

## 2021-11-25 DIAGNOSIS — F79 INTELLECTUAL DISABILITY: Chronic | ICD-10-CM

## 2021-11-25 LAB
ALCOHOL BREATH TEST: 0 (ref 0–0.01)
AMPHETAMINES UR QL SCN: NORMAL
BARBITURATES UR QL: NORMAL
BENZODIAZ UR QL: NORMAL
CANNABINOIDS UR QL SCN: NORMAL
COCAINE UR QL: NORMAL
HCG UR QL: NEGATIVE
OPIATES UR QL SCN: NORMAL
SARS-COV-2 RNA RESP QL NAA+PROBE: NEGATIVE

## 2021-11-25 PROCEDURE — 80307 DRUG TEST PRSMV CHEM ANLYZR: CPT | Performed by: EMERGENCY MEDICINE

## 2021-11-25 PROCEDURE — 99285 EMERGENCY DEPT VISIT HI MDM: CPT | Mod: 25 | Performed by: EMERGENCY MEDICINE

## 2021-11-25 PROCEDURE — 250N000013 HC RX MED GY IP 250 OP 250 PS 637: Performed by: EMERGENCY MEDICINE

## 2021-11-25 PROCEDURE — 81025 URINE PREGNANCY TEST: CPT | Performed by: EMERGENCY MEDICINE

## 2021-11-25 PROCEDURE — C9803 HOPD COVID-19 SPEC COLLECT: HCPCS | Performed by: EMERGENCY MEDICINE

## 2021-11-25 PROCEDURE — 99284 EMERGENCY DEPT VISIT MOD MDM: CPT | Performed by: EMERGENCY MEDICINE

## 2021-11-25 PROCEDURE — 82075 ASSAY OF BREATH ETHANOL: CPT | Performed by: EMERGENCY MEDICINE

## 2021-11-25 PROCEDURE — 124N000002 HC R&B MH UMMC

## 2021-11-25 PROCEDURE — 90791 PSYCH DIAGNOSTIC EVALUATION: CPT

## 2021-11-25 PROCEDURE — U0003 INFECTIOUS AGENT DETECTION BY NUCLEIC ACID (DNA OR RNA); SEVERE ACUTE RESPIRATORY SYNDROME CORONAVIRUS 2 (SARS-COV-2) (CORONAVIRUS DISEASE [COVID-19]), AMPLIFIED PROBE TECHNIQUE, MAKING USE OF HIGH THROUGHPUT TECHNOLOGIES AS DESCRIBED BY CMS-2020-01-R: HCPCS | Performed by: EMERGENCY MEDICINE

## 2021-11-25 RX ORDER — BENZTROPINE MESYLATE 0.5 MG/1
0.5 TABLET ORAL 2 TIMES DAILY
Status: DISCONTINUED | OUTPATIENT
Start: 2021-11-25 | End: 2021-12-02 | Stop reason: HOSPADM

## 2021-11-25 RX ORDER — TRAZODONE HYDROCHLORIDE 50 MG/1
50 TABLET, FILM COATED ORAL
Status: DISCONTINUED | OUTPATIENT
Start: 2021-11-25 | End: 2021-12-02 | Stop reason: HOSPADM

## 2021-11-25 RX ORDER — CALCIUM CARBONATE 500 MG/1
500 TABLET, CHEWABLE ORAL 2 TIMES DAILY PRN
Status: DISCONTINUED | OUTPATIENT
Start: 2021-11-25 | End: 2021-12-02 | Stop reason: HOSPADM

## 2021-11-25 RX ORDER — DOCUSATE SODIUM 100 MG/1
100 CAPSULE, LIQUID FILLED ORAL 2 TIMES DAILY
Status: DISCONTINUED | OUTPATIENT
Start: 2021-11-25 | End: 2021-12-02 | Stop reason: HOSPADM

## 2021-11-25 RX ORDER — BENZTROPINE MESYLATE 1 MG/1
1 TABLET ORAL EVERY EVENING
COMMUNITY
End: 2023-07-14

## 2021-11-25 RX ORDER — ONDANSETRON 4 MG/1
4 TABLET, FILM COATED ORAL EVERY 8 HOURS PRN
COMMUNITY
End: 2024-05-19

## 2021-11-25 RX ORDER — ACETAMINOPHEN 325 MG/1
650 TABLET ORAL EVERY 4 HOURS PRN
Status: DISCONTINUED | OUTPATIENT
Start: 2021-11-25 | End: 2021-12-02 | Stop reason: HOSPADM

## 2021-11-25 RX ORDER — OLANZAPINE 10 MG/1
10 TABLET ORAL 3 TIMES DAILY PRN
Status: DISCONTINUED | OUTPATIENT
Start: 2021-11-25 | End: 2021-11-26

## 2021-11-25 RX ORDER — OLANZAPINE 10 MG/1
10 TABLET, ORALLY DISINTEGRATING ORAL ONCE
Status: COMPLETED | OUTPATIENT
Start: 2021-11-25 | End: 2021-11-24

## 2021-11-25 RX ORDER — OLANZAPINE 10 MG/2ML
10 INJECTION, POWDER, FOR SOLUTION INTRAMUSCULAR 3 TIMES DAILY PRN
Status: DISCONTINUED | OUTPATIENT
Start: 2021-11-25 | End: 2021-11-26

## 2021-11-25 RX ORDER — ONDANSETRON 4 MG/1
4 TABLET, FILM COATED ORAL EVERY 12 HOURS PRN
Status: DISCONTINUED | OUTPATIENT
Start: 2021-11-25 | End: 2021-12-02 | Stop reason: HOSPADM

## 2021-11-25 RX ORDER — VENLAFAXINE HYDROCHLORIDE 150 MG/1
300 CAPSULE, EXTENDED RELEASE ORAL DAILY
Status: DISCONTINUED | OUTPATIENT
Start: 2021-11-26 | End: 2021-12-02 | Stop reason: HOSPADM

## 2021-11-25 RX ORDER — POLYETHYLENE GLYCOL 3350 17 G/17G
17 POWDER, FOR SOLUTION ORAL DAILY
Status: DISCONTINUED | OUTPATIENT
Start: 2021-11-25 | End: 2021-12-02 | Stop reason: HOSPADM

## 2021-11-25 RX ORDER — MAGNESIUM HYDROXIDE/ALUMINUM HYDROXICE/SIMETHICONE 120; 1200; 1200 MG/30ML; MG/30ML; MG/30ML
30 SUSPENSION ORAL EVERY 4 HOURS PRN
Status: DISCONTINUED | OUTPATIENT
Start: 2021-11-25 | End: 2021-12-02 | Stop reason: HOSPADM

## 2021-11-25 RX ORDER — OLANZAPINE 10 MG/1
10 TABLET, ORALLY DISINTEGRATING ORAL EVERY 12 HOURS PRN
Status: DISCONTINUED | OUTPATIENT
Start: 2021-11-25 | End: 2021-11-25 | Stop reason: HOSPADM

## 2021-11-25 RX ORDER — NORGESTIMATE AND ETHINYL ESTRADIOL 0.25-0.035
1 KIT ORAL DAILY
Status: DISCONTINUED | OUTPATIENT
Start: 2021-11-26 | End: 2021-12-02 | Stop reason: HOSPADM

## 2021-11-25 RX ORDER — CHLORHEXIDINE GLUCONATE ORAL RINSE 1.2 MG/ML
15 SOLUTION DENTAL 2 TIMES DAILY
Status: DISCONTINUED | OUTPATIENT
Start: 2021-11-25 | End: 2021-12-02 | Stop reason: HOSPADM

## 2021-11-25 RX ORDER — METOCLOPRAMIDE 10 MG/1
10 TABLET ORAL EVERY 6 HOURS PRN
Status: DISCONTINUED | OUTPATIENT
Start: 2021-11-25 | End: 2021-12-02 | Stop reason: HOSPADM

## 2021-11-25 RX ORDER — HYDROXYZINE HYDROCHLORIDE 50 MG/1
50 TABLET, FILM COATED ORAL 2 TIMES DAILY
Status: DISCONTINUED | OUTPATIENT
Start: 2021-11-25 | End: 2021-12-02 | Stop reason: HOSPADM

## 2021-11-25 RX ORDER — NALTREXONE HYDROCHLORIDE 50 MG/1
50 TABLET, FILM COATED ORAL AT BEDTIME
Status: DISCONTINUED | OUTPATIENT
Start: 2021-11-25 | End: 2021-12-02 | Stop reason: HOSPADM

## 2021-11-25 RX ORDER — OLANZAPINE 10 MG/1
10 TABLET, ORALLY DISINTEGRATING ORAL ONCE
Status: COMPLETED | OUTPATIENT
Start: 2021-11-25 | End: 2021-11-25

## 2021-11-25 RX ORDER — VITAMIN B COMPLEX
1000 TABLET ORAL DAILY
Status: DISCONTINUED | OUTPATIENT
Start: 2021-11-25 | End: 2021-12-02 | Stop reason: HOSPADM

## 2021-11-25 RX ORDER — METOCLOPRAMIDE 10 MG/1
10 TABLET ORAL EVERY 6 HOURS PRN
COMMUNITY
End: 2022-01-08

## 2021-11-25 RX ADMIN — OLANZAPINE 10 MG: 10 TABLET, ORALLY DISINTEGRATING ORAL at 09:48

## 2021-11-25 RX ADMIN — VENLAFAXINE HYDROCHLORIDE 300 MG: 150 CAPSULE, EXTENDED RELEASE ORAL at 09:23

## 2021-11-25 RX ADMIN — OLANZAPINE 10 MG: 10 TABLET, ORALLY DISINTEGRATING ORAL at 16:16

## 2021-11-25 ASSESSMENT — ENCOUNTER SYMPTOMS
SORE THROAT: 0
FREQUENCY: 0
VOMITING: 0
COUGH: 0
DIARRHEA: 0
COLOR CHANGE: 0
NAUSEA: 0
ABDOMINAL PAIN: 0
LIGHT-HEADEDNESS: 0
FEVER: 0
MYALGIAS: 0
CHILLS: 0
ADENOPATHY: 0
WEAKNESS: 0
SHORTNESS OF BREATH: 0
EYE DISCHARGE: 0
HEADACHES: 1
CONFUSION: 0
AGITATION: 1
DYSURIA: 0

## 2021-11-25 ASSESSMENT — ACTIVITIES OF DAILY LIVING (ADL)
HYGIENE/GROOMING: INDEPENDENT
ORAL_HYGIENE: INDEPENDENT
LAUNDRY: UNABLE TO COMPLETE
DRESS: INDEPENDENT;SCRUBS (BEHAVIORAL HEALTH)

## 2021-11-25 ASSESSMENT — MIFFLIN-ST. JEOR: SCORE: 1701.18

## 2021-11-25 NOTE — ED NOTES
11/25/2021  Sadaf Ross 2000     [facility] Crisis Assessment    Patient was assessed: in person  Patient location: [facility] Emergency Department       Referral Data and Chief Complaint  Sadaf is a 21 year old who uses she/melissa pronouns. Patient presented to the ED via EMS and was referred to the ED by other: patient called a crisis line who called the police.  Patient is presenting to the ED for the following concerns: suicidal thoughts, with the plan to bite herself or hit her head on the floor.      Informed Consent and Assessment Methods    Patient is her own guardian. Writer met with patient and explained the crisis assessment process, including applicable information disclosures and limits to confidentiality, assessed understanding of the process, and obtained consent to proceed with the assessment. Patient was observed to be able to participate in the assessment as evidenced by willingness to move out of the hallway into a room for the [facility]. . Assessment methods included conducting a formal interview with patient, review of medical records, collaboration with medical staff, and obtaining relevant collateral information from family and community providers when available.    Narrative Summary of Presenting Problem and Current Functioning  What led to the patient presenting for crisis services, factors that make the crisis life threatening or complex, stressors, how is this disrupting the patient's life, and how current functioning is in comparison to baseline. How is patient presenting during the assessment.     Patient was seen earlier in the day and discharged back to her group home. She is the only resident in the group home at this time so she has 1:1 staffing. After she returned to her group home she called a crisis line who called the police, so the  staff had to let her leave. Patient continues to say she wants to die. She wants to be with her father. She states the fact  that it is Thanksgiving is making her grief worse. She has been observed in the emergency department biting her hand and hitting her head on the wall. Patient did not want to move to the [facility]. Patient was approached while she was laying on a cart. She had a blanket covering her head. She did not respond verbally, but was able to get up from the cart independently and walked to another room. Patient keeps her eyes closed for the full assessment. She provided minimal engagement. She has no insight and her judgement is impaired. She did not present with any psychosis but it was not able to be explored due to patient's lack of cooperation.     History of the Crisis  Duration of the current crisis, coping skills attempted to reduce the crisis, community resources used, and past presentations.    This is patients second ED visit in two days. She was discharged from the [facility] ED two hours prior to her return to the group [place]. At that time she was engaging in SIB, was given Zyprexa and returned to her group home. She was also seen at [facility] yesterday and by her PCP provider the day before. Her mood has continued to escalate all week. She sees her therapist on Mondays. Patient states she has not used any form of coping skills to deal with her fathers death, and can not identity any coping skills available to her. Patient is refusing her medications.     Collateral Information  Arlene, [facility] staff member, [phone], was contacted for collateral information. She states patient started hitting her head on the wall and threatened to kill herself. She also reports that when patient is focused on something, such as being in the hospital, she will not listen and is not re direct able. She also reports patient reporting seeing her father walking down the street.     Risk Assessment    Risk of Harm to Self     ESS-6  1.a. Over the past 2 weeks, have you had thoughts of killing yourself? Yes  1.b. Have you  ever attempted to kill yourself and, if yes, when did this last happen? No She put a knife to her throat one time.   2. Recent or current suicide plan? Yes hitting head on ground or biting herself.    3. Recent or current intent to act on ideation? Yes  4. Lifetime psychiatric hospitalization? Yes  5. Pattern of excessive substance use? No  6. Current irritability, agitation, or aggression? No, irritability  Scoring note: BOTH 1a and 1b must be yes for it to score 1 point, if both are not yes it is zero. All others are 1 point per number. If all questions 1a/1b - 6 are no, risk is negligible. If one of 1a/1b is yes, then risk is mild. If either question 2 or 3, but not both, is yes, then risk is automatically moderate regardless of total score. If both 2 and 3 are yes, risk is automatically high regardless of total score.     Score: 3, moderate risk    The patient has the following risk factors for suicide: depressive symptoms, isolation, poor decision making, poor impulse control, preoccupied with death/dying, prior suicide attempt, recent loss and restless/agitated    Is the patient experiencing current suicidal ideation: Yes. Thoughts to kill self with no plan or intent    Is the patient engaging in preparatory suicide behaviors (formulating how to act on plan, giving away possessions, saying goodbye, displaying dramatic behavior changes, etc)? No    Does the patient have access to firearms or other lethal means? no    The patient has the following protective factors: voluntarily seeking mental health support, established relationship community mental health provider(s) and safe/stable housing    Support system information: Therapist, , PCP, group home staff, mother.     Patient strengths: Patient is resilient. She does not have any active    Does the patient engage in non-suicidal self-injurious behavior (NSSI/SIB)? yes. Method:Biting and hitting her head. Frequency:several times in the last two  days.  Duration:unknown History: Hx. of cutting    Is the patient vulnerable to sexual exploitation?  Yes Living in a group home, intellectual disability.     Is the patient experiencing abuse or neglect? no    Is the patient a vulnerable adult? Yes      Risk of Harm to Others  The patient has the following risk factors of harm to others: agitation, impaired self-control, rumination and ideation    Does the patient have thoughts of harming others? No    Is the patient engaging in sexually inappropriate behavior?  no       Current Substance Abuse    Is there recent substance abuse? no No history noted    Was a urine drug screen or blood alcohol level obtained: Yes pending collection.       Current Symptoms/Concerns    Symptoms  Attention, hyperactivity, and impulsivity symptoms present: Yes: Impulsive, Inattentive and Restless    Anxiety symptoms present: No      Appetite symptoms present: No     Behavioral difficulties present: Yes: Agitation, Apathy, Disruptive, Impulsivity/Disinhibition, Negativistic/Defiant and Withdrawal/Isolation Refusal to engage in safety planning. Self injury. Laying on cart with blanket covering her face. Limited engagement.     Cognitive impairment symptoms present: Yes: Decision-Making and Judgment/Insight    Depressive symptoms present: Yes Feelings of worthlessness , Impaired decision making , Increased irritability/agitation, Isolative , Loss of interest / Anhedonia , Low self esteem  and Thoughts of suicide/death      Eating disorder symptoms present: No    Learning disabilities, cognitive challenges, and/or developmental disorder symptoms present: Yes: Communication, Functional Impairments and Self-Regulation     Manic/hypomanic symptoms present: No    Personality and interpersonal functioning difficulties present : Yes: Cognitive Distortions, Displaces Blame, Emotional Deregulation, Impaired Impulse Control and Impaired Interpersonal Functioning    Psychosis symptoms present: No       Sleep difficulties present: No    Substance abuse disorder symptoms present: No     Trauma and stressor related symptoms present: Yes: Intrusions: Recurrent memories of the trauma Of her fathers death.    Mental Status Exam   Affect: Constricted and Labile   Appearance: Appropriate    Attention Span/Concentration: Inattentive?    Eye Contact: Other: Closed eyes    Fund of Knowledge: Delayed    Language /Speech Content: Fluent   Language /Speech Volume: Soft    Language /Speech Rate/Productions: Minimally Responsive and Normal    Recent Memory: Intact   Remote Memory: Intact   Mood: Depressed and Irritable    Orientation to Person: Yes    Orientation to Place: Yes   Orientation to Time of Day: Yes    Orientation to Date: Yes    Situation (Do they understand why they are here?): Yes    Psychomotor Behavior: Normal    Thought Content: Suicidal   Thought Form: Intact and Obsessive/Perseverative       Mental Health and Substance Abuse History    History  Current and historical diagnoses or mental health concerns: Patient has historical mental health diagnosis of MDD with psychotic features, Bipolar 1 disorder, Borderline personality disorder, and Intellectual disability. Today's presentation is consistent with BPD and MDD.      Prior MH services (inpatient, programmatic care, outpatient, etc) : Yes Inpatient admission, group home living, , indivual therapy,     History of substance abuse: No    Prior LIZZETTE services (inpatient, programmatic care, detox, outpatient, etc) : No    History of commitment: No    Family history of MH/LIZZETTE: No    Trauma history: No    Medication  Psychotropic medications: Yes. Pt is currently taking Eskalith, Abilify Maintena, Hydroxyzine, Naltrexone, Trazodone, Effexor. . Medication compliant: No: Patient states she has not taken her medications for two weeks, her  staff said one week.. Recent medication changes: No    Current Care Team  Primary Care Provider: Yes. Name: Devi  Bj, . Location: [facility]. Date of last visit: 11/23/2021. Frequency: NA. Perceived helpfulness: NA.    Psychiatrist: No    Therapist: Yes. Name: Laina. Location: [facility]. Date of last visit: 11/21/2021. Frequency: weekly. Perceived helpfulness: NA.    : No    CTSS or ARMHS: No    ACT Team: No    Other: No    Release of Information  Was a release of information signed: Yes. Providers included on the release:        Biopsychosocial Information    Socioeconomic Information  Current living situation: Patient currently lives in a group home where she is the only resident at this time.     Employment/income source: Disability,     Relevant legal issues: None    Cultural, Oriental orthodox, or spiritual influences on mental health care: NA    Is the patient active in the  or a : No      Relevant Medical Concerns   Patient identifies concerns with completing ADLs? No     Patient can ambulate independently? Yes     Other medical concerns? No     History of concussion or TBI? No        Diagnosis    301.83 (F60.3) Borderline Personality Disorder - by history     296.23 (F32.2) Major Depressive Disorder, Single Episode, Severe _ and With melancholic features - by history     Intellectual Disabilites  319 (F79) Unspecified Intellectual Disability (Intellectual Developmental Disorder) - by history       Therapeutic Intervention  The following therapeutic methodologies were employed when working with the patient: establishing rapport, active listening, assessing dimensions of crisis, solution focused brief therapy and identifying additional supports and alternative coping skills. Patient response to intervention: Patient refused to engage, she walked out of the room when pressed about alternative coping skills.      Disposition  Recommended disposition: Inpatient Mental Health      Reviewed case and recommendations with attending provider. Attending Name: Dr. Sirena menon  with disposition: Yes      Patient concurs with disposition: Yes      Final disposition: Inpatient mental health .     Inpatient Details (if applicable):  Is patient admitted voluntarily:Yes    Patient aware of potential for transfer if there is not appropriate placement? Yes    [facility]? NA      Behavioral Intake Notified? Yes: Date: 11/25/2021 Time: 2:00.       Clinical Substantiation of Recommendations   Rationale with supporting factors for disposition and diagnosis.     Patient presents with symptoms consistent with borderline personality disorder with mood dysregulation, poor interpersonal relationships, chronic suicidal thoughts, self injurious behaviors, impulsivity, limited insight, unwillingness to engage in safety planning, and engaging in self injurious behaviors. She is also showing signs of major depressive disorder with mood symptoms, suicidal thoughts, flat affect, grief issues, and possible visual hallucinations per group home staff. She has been seen in several emergency departments and there was a trial of returning her to her group home today, which failed.  Patient is currently the only resident in her group home and while in the ED she refused to move from the ED to the [facility] because it was too quiet. This may be one reason why she does not want to be in her group home at this time.       Assessment Details  Patient interview started at: 1:40 and completed at: 2:10.    Total duration spent on the patient case in minutes: .50 hrs     CPT code(s) utilized: 58575 - Psychotherapy for Crisis - 60 (30-74*) min     ZEESHAN Kohler  [employer]  [email or web address]

## 2021-11-25 NOTE — ED NOTES
Following being told she was returning to her group home, patient shortly after began biting herself. A code 21 was called to assist if necessary. Dr. Esqueda present and agreed that patient will be given something to relax and reassessed in the morning, with the plan to return to the group home remaining in place.

## 2021-11-25 NOTE — ED NOTES
At about 0935 patient began bumping her head into her room's wall while yelling that she wanted to die. Through breathing exercises and Zyprexa she was able to calm herself down. She is currently laying down and speaking with the psych associate. She denies head pain and is A&Ox4. I discussed this incident with Dr. Pack and Michelle her DEC  who both stated that now she has calmed herself we can proceed with discharge. Arlene with patient's group home was called and given report including information about this incident and states she will take the patient back when patient is discharged today.

## 2021-11-25 NOTE — SAFE
Sadaf Ross  November 25, 2021  SAFE Note    Critical Safety Issues: Biting and banding her head on the walls and ground.      Current Suicidal Ideation/Self-Injurious Concerns/Methods: Hitting/Punching Self      Current or Historical Inappropriate Sexual Behavior: No      Current or Historical Aggression/Homicidal Ideation: Agitation/Hyperactivity and Impaired Self-Control      Triggers: death of her father/Thanksgiving    Updated care team: Yes: MD, RN, Intake     For additional details see full [facility] assessment.       ZEESHAN Kohler

## 2021-11-25 NOTE — ED PROVIDER NOTES
"    [facility] EMERGENCY DEPARTMENT ([facility])     2021    ED Provider Note  [facility]      History     Chief Complaint   Patient presents with     Suicidal     Patient states that she wants to die, and that she is feeling sucicial, she states to have lost a parent this month, as well as panic attacks are occuring      Insomnia     Patinet and  (who has now left) states that she hasn't been sleeping at night      Nausea & Vomiting     Patint has been feeling nauseas with abdominal roel n     HPI  Sadaf Ross is a 21 year old female with a history of borderline personality disorder bipolar disorder who presents for suicidal ideation and abdominal pain.  Patient reports increasing thoughts of suicide since her father  last February.  She states that she would \"bite myself to death\".  She also reports diffuse abdominal pain that has been on and off for months.  She has been in and out of multiple emergency departments for this, including [facility] earlier today.  She has had negative abdominal work-ups for this.  She denies any new or changed symptoms.     Past Medical History  Past Medical History:   Diagnosis Date     ADHD (attention deficit hyperactivity disorder)      Bipolar 1 disorder (H)      Borderline personality disorder (H)      Depression      Depressive disorder      Intellectual disability      Obesity      Syncope      Past Surgical History:   Procedure Laterality Date     APPENDECTOMY       APPENDECTOMY       acetaminophen (TYLENOL) 650 MG CR tablet  alum & mag hydroxide-simethicone (MAALOX) 200-200-20 MG/5ML SUSP suspension  ARIPiprazole ER (ABILIFY MAINTENA) 400 MG extended release inj syringe  calcium carbonate (TUMS) 500 MG chewable tablet  chlorhexidine (CHLORHEXIDINE) 0.12 % solution  docusate sodium (COLACE) 100 MG capsule  hydrOXYzine (ATARAX) 25 MG tablet  ibuprofen (ADVIL/MOTRIN) 400 MG tablet  lithium (ESKALITH) " "150 MG capsule  naltrexone (DEPADE/REVIA) 50 MG tablet  norgestimate-ethinyl estradiol (SPRINTEC 28) 0.25-35 MG-MCG tablet  omeprazole (PRILOSEC) 40 MG DR capsule  polyethylene glycol (MIRALAX) 17 g packet  traZODone (DESYREL) 50 MG tablet  venlafaxine (EFFEXOR-XR) 150 MG 24 hr capsule  Vitamin D, Cholecalciferol, 25 MCG (1000 UT) TABS      Allergies   Allergen Reactions     Penicillins Rash and Unknown     Family History  Family History   Problem Relation Age of Onset     Diabetes Type 1 Father      Cancer Paternal Grandfather      Social History   Social History     Tobacco Use     Smoking status: Current Some Day Smoker     Years: 5.00     Types: Cigarettes     Smokeless tobacco: Never Used   Substance Use Topics     Alcohol use: No     Drug use: No      Past medical history, past surgical history, medications, allergies, family history, and social history were reviewed with the patient. No additional pertinent items.       Review of Systems  A complete review of systems was performed with pertinent positives and negatives noted in the HPI, and all other systems negative.    Physical Exam   BP: (!) 158/90  Pulse: 96  Resp: 18  Height: 162.6 cm (5' 4\")  Weight: 99.3 kg (218 lb 14.4 oz)  SpO2: 100 %  Physical Exam  Vitals and nursing note reviewed.   Constitutional:       General: She is not in acute distress.     Appearance: Normal appearance. She is obese. She is not diaphoretic.   HENT:      Head: Atraumatic.      Mouth/Throat:      Pharynx: No oropharyngeal exudate.   Eyes:      General: No scleral icterus.     Pupils: Pupils are equal, round, and reactive to light.   Cardiovascular:      Heart sounds: Normal heart sounds.   Pulmonary:      Effort: No respiratory distress.      Breath sounds: Normal breath sounds.   Abdominal:      General: Bowel sounds are normal.      Palpations: Abdomen is soft.      Tenderness: There is no abdominal tenderness.   Musculoskeletal:         General: No tenderness.   Skin:     " General: Skin is warm.      Findings: No rash.   Neurological:      Mental Status: She is alert.         ED Course      Procedures         Results for orders placed or performed during the hospital encounter of 11/24/21   Comprehensive metabolic panel     Status: Normal   Result Value Ref Range    Sodium 140 133 - 144 mmol/L    Potassium 4.2 3.4 - 5.3 mmol/L    Chloride 109 94 - 109 mmol/L    Carbon Dioxide (CO2) 28 20 - 32 mmol/L    Anion Gap 3 3 - 14 mmol/L    Urea Nitrogen 11 7 - 30 mg/dL    Creatinine 0.71 0.52 - 1.04 mg/dL    Calcium 9.6 8.5 - 10.1 mg/dL    Glucose 95 70 - 99 mg/dL    Alkaline Phosphatase 50 40 - 150 U/L    AST 18 0 - 45 U/L    ALT 29 0 - 50 U/L    Protein Total 7.8 6.8 - 8.8 g/dL    Albumin 4.0 3.4 - 5.0 g/dL    Bilirubin Total 0.3 0.2 - 1.3 mg/dL    GFR Estimate >90 >60 mL/min/1.73m2   CBC with platelets and differential     Status: None   Result Value Ref Range    WBC Count 9.1 4.0 - 11.0 10e3/uL    RBC Count 4.26 3.80 - 5.20 10e6/uL    Hemoglobin 12.0 11.7 - 15.7 g/dL    Hematocrit 36.2 35.0 - 47.0 %    MCV 85 78 - 100 fL    MCH 28.2 26.5 - 33.0 pg    MCHC 33.1 31.5 - 36.5 g/dL    RDW 12.5 10.0 - 15.0 %    Platelet Count 267 150 - 450 10e3/uL    % Neutrophils 61 %    % Lymphocytes 30 %    % Monocytes 5 %    % Eosinophils 3 %    % Basophils 1 %    % Immature Granulocytes 0 %    NRBCs per 100 WBC 0 <1 /100    Absolute Neutrophils 5.6 1.6 - 8.3 10e3/uL    Absolute Lymphocytes 2.8 0.8 - 5.3 10e3/uL    Absolute Monocytes 0.4 0.0 - 1.3 10e3/uL    Absolute Eosinophils 0.3 0.0 - 0.7 10e3/uL    Absolute Basophils 0.1 0.0 - 0.2 10e3/uL    Absolute Immature Granulocytes 0.0 <=0.0 10e3/uL    Absolute NRBCs 0.0 10e3/uL   CBC with platelets differential     Status: None    Narrative    The following orders were created for panel order CBC with platelets differential.  Procedure                               Abnormality         Status                     ---------                               -----------          ------                     CBC with platelets and d...[[id number]]                      Final result                 Please view results for these tests on the individual orders.     Medications   OLANZapine zydis (zyPREXA) ODT tab 5 mg (5 mg Oral Not Given 11/24/21 2005)   lithium (ESKALITH) capsule 450 mg (has no administration in time range)   traZODone (DESYREL) tablet 50 mg (has no administration in time range)   venlafaxine (EFFEXOR-XR) 24 hr capsule 300 mg (has no administration in time range)   naltrexone (DEPADE/REVIA) tablet 50 mg (has no administration in time range)   OLANZapine zydis (zyPREXA) ODT tab 10 mg (has no administration in time range)   hydrOXYzine (ATARAX) tablet 25 mg (has no administration in time range)   OLANZapine zydis (zyPREXA) 10 MG ODT tab (10 mg  Given 11/24/21 2102)        Assessments & Plan (with Medical Decision Making)   Patient was seen and examined.  Basic labs were ordered and were unremarkable.  The patient's abdominal exam is overall benign.  The patient was evaluated by our mental health  and deemed stable for discharge home.  Upon informing the patient that she would be discharged home, she began biting her hands.  She did not break the skin at any time.  We were able to convince the patient to take some oral Zyprexa.  She will be held in the emergency department overnight and reassessed in the morning.  I do not believe that the patient meets any criteria for inpatient stay.  Final disposition pending reassessment in the morning.    I have reviewed the nursing notes. I have reviewed the findings, diagnosis, plan and need for follow up with the patient.    New Prescriptions    No medications on file       Final diagnoses:   Borderline personality disorder (H)   Chronic abdominal pain       --  Susie Esqueda  [facility] EMERGENCY DEPARTMENT  11/24/2021     Susie Esqueda DO  11/25/21 0107

## 2021-11-25 NOTE — ED NOTES
"[facility] ED Mental Health Handoff Note:       Brief HPI:  This is a 21 year old female signed out to me by Dr. Esqueda.  See initial ED Provider note for full details of the presentation. Interval history is pertinent for SI, chronic abd pain. Attempted discharge however pt bit self, reassess in AM.    Home meds reviewed and ordered/administered: Yes    Medically stable for inpatient mental health admission: Yes.    Evaluated by mental health: Yes. The recommendation is for outpatient mental health treatment. Resources and plan given to patient.    Safety concerns: At the time I received sign out, there were no safety concerns.    Hold Status:  Active Orders   N/A            Exam:   Patient Vitals for the past 24 hrs:   BP Temp src Pulse Resp SpO2 Height Weight   11/24/21 1821 (!) 158/90 Oral 96 18 100 % 1.626 m (5' 4\") 99.3 kg (218 lb 14.4 oz)           ED Course:    Medications   OLANZapine zydis (zyPREXA) ODT tab 5 mg (5 mg Oral Not Given 11/24/21 2005)   lithium (ESKALITH) capsule 450 mg (has no administration in time range)   traZODone (DESYREL) tablet 50 mg (has no administration in time range)   venlafaxine (EFFEXOR-XR) 24 hr capsule 300 mg (has no administration in time range)   naltrexone (DEPADE/REVIA) tablet 50 mg (has no administration in time range)   OLANZapine zydis (zyPREXA) ODT tab 10 mg (has no administration in time range)   hydrOXYzine (ATARAX) tablet 25 mg (has no administration in time range)   OLANZapine zydis (zyPREXA) 10 MG ODT tab (10 mg  Given 11/24/21 2102)            There were no significant events during my shift.    Patient was signed out to the oncoming provider      Impression:    ICD-10-CM    1. Borderline personality disorder (H)  F60.3    2. Chronic abdominal pain  R10.9     G89.29        Plan:    1. Reassessment in AM      RESULTS:   Results for orders placed or performed during the hospital encounter of 11/24/21 (from the past 24 hour(s))   CBC with platelets " differential     Status: None    Collection Time: 11/24/21  7:02 PM    Narrative    The following orders were created for panel order CBC with platelets differential.  Procedure                               Abnormality         Status                     ---------                               -----------         ------                     CBC with platelets and d...[[id number]]                      Final result                 Please view results for these tests on the individual orders.   Comprehensive metabolic panel     Status: Normal    Collection Time: 11/24/21  7:02 PM   Result Value Ref Range    Sodium 140 133 - 144 mmol/L    Potassium 4.2 3.4 - 5.3 mmol/L    Chloride 109 94 - 109 mmol/L    Carbon Dioxide (CO2) 28 20 - 32 mmol/L    Anion Gap 3 3 - 14 mmol/L    Urea Nitrogen 11 7 - 30 mg/dL    Creatinine 0.71 0.52 - 1.04 mg/dL    Calcium 9.6 8.5 - 10.1 mg/dL    Glucose 95 70 - 99 mg/dL    Alkaline Phosphatase 50 40 - 150 U/L    AST 18 0 - 45 U/L    ALT 29 0 - 50 U/L    Protein Total 7.8 6.8 - 8.8 g/dL    Albumin 4.0 3.4 - 5.0 g/dL    Bilirubin Total 0.3 0.2 - 1.3 mg/dL    GFR Estimate >90 >60 mL/min/1.73m2   CBC with platelets and differential     Status: None    Collection Time: 11/24/21  7:02 PM   Result Value Ref Range    WBC Count 9.1 4.0 - 11.0 10e3/uL    RBC Count 4.26 3.80 - 5.20 10e6/uL    Hemoglobin 12.0 11.7 - 15.7 g/dL    Hematocrit 36.2 35.0 - 47.0 %    MCV 85 78 - 100 fL    MCH 28.2 26.5 - 33.0 pg    MCHC 33.1 31.5 - 36.5 g/dL    RDW 12.5 10.0 - 15.0 %    Platelet Count 267 150 - 450 10e3/uL    % Neutrophils 61 %    % Lymphocytes 30 %    % Monocytes 5 %    % Eosinophils 3 %    % Basophils 1 %    % Immature Granulocytes 0 %    NRBCs per 100 WBC 0 <1 /100    Absolute Neutrophils 5.6 1.6 - 8.3 10e3/uL    Absolute Lymphocytes 2.8 0.8 - 5.3 10e3/uL    Absolute Monocytes 0.4 0.0 - 1.3 10e3/uL    Absolute Eosinophils 0.3 0.0 - 0.7 10e3/uL    Absolute Basophils 0.1 0.0 - 0.2 10e3/uL    Absolute Immature  Granulocytes 0.0 <=0.0 10e3/uL    Absolute NRBCs 0.0 10e3/uL             MD Joaquin Alvarado Matthew Joseph, MD  11/25/21 0139
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
## 2021-11-25 NOTE — ED NOTES
Extended Care    Sadaf Ross  November 25, 2021    Sadaf is followed related to Other: check in prior to return to .. Please see initial DEC Crisis Assessment completed by Mario Andrew on 11/24/2021 for complete assessment information. Notable concerns include Chronic abdominal pain, BPD with suicidal ideation..    Patient is interviewed via ipad. Sadaf reports she wants to die.  When asked what would happen, she reports she would be with her dad.   Began to hyperventilate, rock back and forth, covered face with blanket.  Was able to engage in breathing exercises as this writer counted breaths and calmed.  Discussed having pt return to .  Reports no one is home.  Then stated if sent by cab, she would bite self.  Explored self control and coping strategies.  Pt ate breakfast while being interviewed.  Appeared to have a healthy appetite.   Pt is alert; affect is blunted; mood is depressed. Pt has suicidal ideation which is described as passive suicidal ideation,  reports wanting to 'bite self to death' . There are concerns for Aggression. Has been aggressive with others, has 1;1 staffing without other residents due to past behavior concerns. There are not new concerns noted. Sadaf has 10 ED visits in November for reports of abdominal pain and SI.  Over the course of contact, provided reassurance, offered validation, engaged patient in problem solving and disposition planning and worked with patient on safety and aftercare planning.  Sadaf was calm and cooperative when interview ended, a short while later was reportedly wailing and in distress.      Phone contact with  manager.  Staff are at home, pt can be returned.  Pt was seen yesterday at Urgent care after reporting chest and abdominal pain, was sent to [facility] for further follow up.  Pt was discharged home following ED visit.  On return home,  manager reports pt called 911 for abdominal pain and SI.  Pt reportedly is putting her fingers in  her throat following meals to vomit.  Will then report to staff she vomited blood.  Staff have not seen any blood present in emesis.  Will call 911 on own phone to report she has fainted, vomited blood.  Will report SI and desire to die as well.  Has been resistant to taking medications.  Has therapist, , [facility] worker.    ED care team is updated, including Dr. Pack . Discussed discharge to .  Sadaf did escalate again in ED, hitting head on wall.  Zyprexa was given, has begun to calm down. Medically cleared  Plan will remain to discharge to  this am.    Plan:     Continue to monitor for harm. Consider: Use a positive, direct and calm approach. Pt's tend to match the energy/mood of the staff. Keep focus positive and upbeat, Use clear and concise directions, too many words can be overwhelming and Verbally state expectations     Continue care coordination with  staff     Maintain current transition plan. discharge to  via ambulance.    DEC will follow. DEC may be reached at [phone] if further clinical intervention is needed.     Michelle Lewis, [facility]  [facility]   [phone]

## 2021-11-25 NOTE — ED NOTES
If I am feeling unsafe or I am in a crisis, I will:   Contact my established care providers   Call the National Suicide Prevention Lifeline: [phone]   Go to the nearest emergency room   Call 911      Warning signs that I or other people might notice when a crisis is developing for me:   when I start feeling annoyed., angry, or frustrated lack of sleep, arguments with others   thoughts of biting or cutting myself  that I want to die     Things I am able to do on my own to cope or help me feel better: listen to calm music,  going to my room and shutting the door,   smoking a cigarette,   asking a staff member to talk with me      Things that I am able to do with others to cope or help me better:   talk,   go for a walk      Things I can use or do for distraction:   talk to staff        Changes I can make to support my mental health and wellness: Calling the crisis team for support,    talk with my therapist about other ways of coping with overwhelming emotions      People in my life that I can ask for help:    group home staff,   therapist,   Social Workers      Your [facility] has a mental health crisis team you can call 24/7: [facility], [phone]     Other things that are important when I m in crisis: Practice my coping skills before calling 911

## 2021-11-25 NOTE — PHARMACY-ADMISSION MEDICATION HISTORY
Admission Medication History Completed by Pharmacy    See [facility] Admission Navigator for allergy information, preferred outpatient pharmacy, prior to admission medications and immunization status.     Medication History Sources:     Dispense report, assisted living facility medication list     Changes made to [facility] medication list (reason):    Added:    Benztropine 0.5mg tablet 1 tablet PO BID     Metoclopramide 10mg tablet 1 tablet PO Q6H PRN nausea     Ondansetron 4mg tablet 1 tablet under tongue BID PRN nausea       Deleted:     Trazodone 50mg tablet 1 tablet at bedtime (patient no longer taking)       Changed:     Hydroxyzine 25mg tablet - take 50mg PO Q8H PRN ---> Hydroxyzine 50mg tablet take 1 tablet PO BID    Maalox [phone]/mL suspension take 50mL PO every day PRN indigestion ---> 15mL PO Q6H PRN       Additional Information:    Current medications were verified using Current Medication List from [facility] ([phone])      Most recent injection of aripiprazole 400mg ER was on 11/4/2021 per assisted living facility     Prior to Admission medications    Medication Sig Last Dose Taking? Auth Provider   acetaminophen (TYLENOL) 650 MG CR tablet Take 650 mg by mouth every 6 hours as needed for mild pain or fever  Unknown at Unknown time Yes Reported, Patient   alum & mag hydroxide-simethicone (MAALOX) 200-200-20 MG/5ML SUSP suspension Take 15 mLs by mouth every 6 hours as needed for indigestion  Unknown at Unknown time Yes Reported, Patient   ARIPiprazole ER (ABILIFY MAINTENA) 400 MG extended release inj syringe Inject 400 mg into the muscle every 28 days On the 4th of each month 11/4/2021 Yes Reported, Patient   benztropine (COGENTIN) 0.5 MG tablet Take 0.5 mg by mouth 2 times daily Unknown at Unknown time Yes Unknown, Entered By History   calcium carbonate (TUMS) 500 MG chewable tablet Take 1 chew tab by mouth 2 times daily as needed for heartburn  Unknown at Unknown time Yes Reported,  Patient   chlorhexidine (CHLORHEXIDINE) 0.12 % solution Swish and spit 15 mLs in mouth 2 times daily  Unknown at Unknown time Yes Reported, Patient   docusate sodium (COLACE) 100 MG capsule Take 100 mg by mouth 2 times daily  Unknown at Unknown time Yes Reported, Patient   hydrOXYzine (ATARAX) 50 MG tablet Take 50 mg by mouth 2 times daily  Unknown at Unknown time Yes Reported, Patient   ibuprofen (ADVIL/MOTRIN) 400 MG tablet Take 400 mg by mouth 3 times daily as needed for moderate pain Unknown at Unknown time Yes Reported, Patient   lithium (ESKALITH) 150 MG capsule Take 3 capsules (450 mg) by mouth At Bedtime Unknown at Unknown time Yes Pacheco Soares MD   metoclopramide (REGLAN) 10 MG tablet Take 10 mg by mouth every 6 hours as needed Unknown at Unknown time Yes Unknown, Entered By History   naltrexone (DEPADE/REVIA) 50 MG tablet Take 50 mg by mouth At Bedtime Unknown at Unknown time Yes Reported, Patient   norgestimate-ethinyl estradiol (SPRINTEC 28) 0.25-35 MG-MCG tablet Take 1 tablet by mouth daily Unknown at Unknown time Yes Reported, Patient   omeprazole (PRILOSEC) 40 MG DR capsule Take 40 mg by mouth daily before breakfast Unknown at Unknown time Yes Reported, Patient   ondansetron (ZOFRAN) 4 MG tablet Take 4 mg by mouth every 12 hours as needed for nausea Give 1 tablet under tongue up to 2 times per day Unknown at Unknown time Yes Unknown, Entered By History   polyethylene glycol (MIRALAX) 17 g packet Take 1 packet by mouth daily Unknown at Unknown time Yes Reported, Patient   venlafaxine (EFFEXOR-XR) 150 MG 24 hr capsule Take 2 capsules (300 mg) by mouth daily Unknown at Unknown time Yes Jl Connor MD   Vitamin D, Cholecalciferol, 25 MCG (1000 UT) TABS Take 1,000 Units by mouth daily  Unknown at Unknown time Yes Reported, Patient       Date completed: 11/25/21    Medication history completed by: Kaleb Yuan, Pharmacy Intern

## 2021-11-25 NOTE — ED NOTES
Nursing report handoff now from Yumiko Jacobs at pts'  staff states they have no personnel that can come pick the patient up and states the pt typically gets a taxi ride home after ER visits.

## 2021-11-25 NOTE — TELEPHONE ENCOUNTER
"S:  Amira, DEC called @ 2pm with 21y.F with SI and a plan for IP MH     B:  Pt presents from Group Home after an earlier visit also to the ED.  Pt is very depressed secondary to her father  in 2021 and Thanksgiving is triggering her sadness.  Pt endorses she does not want to live and engaging in SIB of banging her head.  Pt states her plan would be to continuously hit head and bite self as SI attempt.   Pt has dx of MDD, Intellectual disability, Developmental Disability, and Bipolar DO.  Pt is endorsing AH and VH and states she sees her dad on the streets.  Pt has a therapist and a , and states PCP handles her medications.  Pt has not taken meds for the last week - per group home.   Pt has been calm and cooperative in the ED.      A:  Vol    R:  Patient cleared and ready for behavioral bed placement: Yes   Pt placed on adult work list  Dr Orozco paged @ 2:11pm to present for EBER/Joanie/Pam Orozco called back @ 2\"30pm and accepted pt for EBER/Jeannine/Naegele  Pt placed in unit que and charge called with disposition @ 2:25pm  ED Updated with placement @ 2:49pm    ED Updated with placement    "

## 2021-11-25 NOTE — ED NOTES
This writer went to ask pt about pts address and informed taxi ride will be situated for the pt to return home. Pt agreed to address given and began rubbing her face into the mat grunting and biting herself. Pt then stated she wanted to kill herself when asked how she said I am going to bite myself and bit my head on the wall.  Pt unwilling to take zyprexa orally initially and then agreed to take Rx.

## 2021-11-25 NOTE — ED NOTES
More agitated at 0935 and bumped her head against the wall.  She was able to be calmed with breathing exercise, counseling, and Zyprexa.  She was assessed by the mental health associate who felt she still did not meet criteria for admission.  Patient is calm and can proceed with discharge.     Leo Pack MD  11/25/21 8713

## 2021-11-25 NOTE — ED TRIAGE NOTES
Patient comes in stating to feel suicidal and feeling like she is going to die. Patient states that she is having abdominal pain and nausea, and that she has been going to different urgent cares and EDs over the last couple weeks and she feels as though that they aren't telling her what is wrong with her (she has been told everything is normal). She also complaints of insomnia over the last few nights.

## 2021-11-25 NOTE — ED TRIAGE NOTES
Pt was discharge from her to her  and per EMS upon her return the pt was stating she didn't want to live anymore and started banging her head. Pt upon arrival is quiet and calm.

## 2021-11-25 NOTE — ED PROVIDER NOTES
ED Provider Note  [facility]      History     Chief Complaint   Patient presents with     Suicidal     HPI  Sadaf Ross is a 21 year old female who was just discharged from the emergency department for behavioral issues and suicidal ideation.  Please refer to yesterday's note.  She reports that her father  and that she is missing him around Thanksgiving.  During her earlier visit, it was felt that she could be safely discharged as there was not much of a plan and it seemed more like attention seeking behavior.  When she got to her group home, she reported continued suicidal ideation and banged her head against a wall.    She reports that she feels like she could kill herself by hitting her head against the wall or by biting herself.  She has no breaks in her skin.  She did not lose consciousness.    Past Medical History  Past Medical History:   Diagnosis Date     ADHD (attention deficit hyperactivity disorder)      Bipolar 1 disorder (H)      Borderline personality disorder (H)      Depression      Depressive disorder      Intellectual disability      Obesity      Syncope      Past Surgical History:   Procedure Laterality Date     APPENDECTOMY       APPENDECTOMY       acetaminophen (TYLENOL) 650 MG CR tablet  alum & mag hydroxide-simethicone (MAALOX) 200-200-20 MG/5ML SUSP suspension  ARIPiprazole ER (ABILIFY MAINTENA) 400 MG extended release inj syringe  benztropine (COGENTIN) 0.5 MG tablet  calcium carbonate (TUMS) 500 MG chewable tablet  chlorhexidine (CHLORHEXIDINE) 0.12 % solution  docusate sodium (COLACE) 100 MG capsule  hydrOXYzine (ATARAX) 50 MG tablet  ibuprofen (ADVIL/MOTRIN) 400 MG tablet  lithium (ESKALITH) 150 MG capsule  metoclopramide (REGLAN) 10 MG tablet  naltrexone (DEPADE/REVIA) 50 MG tablet  norgestimate-ethinyl estradiol (SPRINTEC 28) 0.25-35 MG-MCG tablet  omeprazole (PRILOSEC) 40 MG DR capsule  ondansetron (ZOFRAN) 4 MG tablet  polyethylene glycol  (MIRALAX) 17 g packet  venlafaxine (EFFEXOR-XR) 150 MG 24 hr capsule  Vitamin D, Cholecalciferol, 25 MCG (1000 UT) TABS      Allergies   Allergen Reactions     Penicillins Rash and Unknown     Family History  Family History   Problem Relation Age of Onset     Diabetes Type 1 Father      Cancer Paternal Grandfather      Social History   Social History     Tobacco Use     Smoking status: Current Some Day Smoker     Years: 5.00     Types: Cigarettes     Smokeless tobacco: Never Used   Substance Use Topics     Alcohol use: No     Drug use: No      Past medical history, past surgical history, medications, allergies, family history, and social history were reviewed with the patient. No additional pertinent items.       Review of Systems   Constitutional: Negative for chills and fever.   HENT: Negative for congestion and sore throat.    Eyes: Negative for discharge.   Respiratory: Negative for cough and shortness of breath.    Cardiovascular: Negative for chest pain and leg swelling.   Gastrointestinal: Negative for abdominal pain, diarrhea, nausea and vomiting.   Genitourinary: Negative for dysuria and frequency.   Musculoskeletal: Negative for myalgias.   Skin: Negative for color change and rash.   Neurological: Positive for headaches. Negative for weakness and light-headedness.   Hematological: Negative for adenopathy.   Psychiatric/Behavioral: Positive for agitation, behavioral problems and self-injury. Negative for confusion.       Physical Exam   BP: 118/74  Pulse: 103  Temp: 98  F (36.7  C)  Resp: 16  SpO2: 100 %  Physical Exam  Constitutional:       General: She is not in acute distress.     Appearance: She is well-developed.   HENT:      Head: Normocephalic and atraumatic.   Eyes:      Conjunctiva/sclera: Conjunctivae normal.      Pupils: Pupils are equal, round, and reactive to light.   Neck:      Thyroid: No thyromegaly.      Trachea: No tracheal deviation.   Cardiovascular:      Rate and Rhythm: Normal rate and  regular rhythm.      Heart sounds: Normal heart sounds. No murmur heard.      Pulmonary:      Effort: Pulmonary effort is normal. No respiratory distress.      Breath sounds: Normal breath sounds. No wheezing.   Chest:      Chest wall: No tenderness.   Abdominal:      General: There is no distension.      Palpations: Abdomen is soft.      Tenderness: There is no abdominal tenderness.   Musculoskeletal:         General: No tenderness.      Cervical back: Normal range of motion and neck supple.   Skin:     General: Skin is warm.      Findings: No rash.   Neurological:      Mental Status: She is alert and oriented to person, place, and time.      Sensory: No sensory deficit.   Psychiatric:         Behavior: Behavior normal.       ED Course      Procedures       Results for orders placed or performed during the hospital encounter of 11/25/21 (from the past 24 hour(s))   Alcohol breath test POCT   Result Value Ref Range    Alcohol Breath Test 0.00 0.00 - 0.01   Urine Drugs of Abuse Screen    Narrative    The following orders were created for panel order Urine Drugs of Abuse Screen.  Procedure                               Abnormality         Status                     ---------                               -----------         ------                     Drug abuse screen 1 urin...[[id number]]  Normal              Final result                 Please view results for these tests on the individual orders.   Drug abuse screen 1 urine (ED)   Result Value Ref Range    Amphetamines Urine Screen Negative Screen Negative    Barbiturates Urine Screen Negative Screen Negative    Benzodiazepines Urine Screen Negative Screen Negative    Cannabinoids Urine Screen Negative Screen Negative    Cocaine Urine Screen Negative Screen Negative    Opiates Urine Screen Negative Screen Negative        Results for orders placed or performed during the hospital encounter of 11/24/21   Comprehensive metabolic panel     Status: Normal   Result Value  Ref Range    Sodium 140 133 - 144 mmol/L    Potassium 4.2 3.4 - 5.3 mmol/L    Chloride 109 94 - 109 mmol/L    Carbon Dioxide (CO2) 28 20 - 32 mmol/L    Anion Gap 3 3 - 14 mmol/L    Urea Nitrogen 11 7 - 30 mg/dL    Creatinine 0.71 0.52 - 1.04 mg/dL    Calcium 9.6 8.5 - 10.1 mg/dL    Glucose 95 70 - 99 mg/dL    Alkaline Phosphatase 50 40 - 150 U/L    AST 18 0 - 45 U/L    ALT 29 0 - 50 U/L    Protein Total 7.8 6.8 - 8.8 g/dL    Albumin 4.0 3.4 - 5.0 g/dL    Bilirubin Total 0.3 0.2 - 1.3 mg/dL    GFR Estimate >90 >60 mL/min/1.73m2   CBC with platelets and differential     Status: None   Result Value Ref Range    WBC Count 9.1 4.0 - 11.0 10e3/uL    RBC Count 4.26 3.80 - 5.20 10e6/uL    Hemoglobin 12.0 11.7 - 15.7 g/dL    Hematocrit 36.2 35.0 - 47.0 %    MCV 85 78 - 100 fL    MCH 28.2 26.5 - 33.0 pg    MCHC 33.1 31.5 - 36.5 g/dL    RDW 12.5 10.0 - 15.0 %    Platelet Count 267 150 - 450 10e3/uL    % Neutrophils 61 %    % Lymphocytes 30 %    % Monocytes 5 %    % Eosinophils 3 %    % Basophils 1 %    % Immature Granulocytes 0 %    NRBCs per 100 WBC 0 <1 /100    Absolute Neutrophils 5.6 1.6 - 8.3 10e3/uL    Absolute Lymphocytes 2.8 0.8 - 5.3 10e3/uL    Absolute Monocytes 0.4 0.0 - 1.3 10e3/uL    Absolute Eosinophils 0.3 0.0 - 0.7 10e3/uL    Absolute Basophils 0.1 0.0 - 0.2 10e3/uL    Absolute Immature Granulocytes 0.0 <=0.0 10e3/uL    Absolute NRBCs 0.0 10e3/uL     Medications - No data to display     Assessments & Plan (with Medical Decision Making)   Patient is a 21-year-old female with a history of developmental delay, borderline personality disorder, and suicidal ideation.  She presents with continued suicidal ideation.  Medical clearance will be performed in preparation for admission to a hospital inpatient bed.    A behavioral assessment was again performed today.  It is felt the patient cannot be safely discharged with continued suicidal ideation and attempts at self-harm.  She will be admitted to the hospital.    I  have reviewed the nursing notes. I have reviewed the findings, diagnosis, plan and need for follow up with the patient.    New Prescriptions    No medications on file     .  Final diagnoses:   Suicidal ideation       --  Leo Pack  [facility] EMERGENCY DEPARTMENT  11/25/2021     Leo Pack MD  11/25/21 154

## 2021-11-26 LAB
CHOLEST SERPL-MCNC: 171 MG/DL
HDLC SERPL-MCNC: 44 MG/DL
LDLC SERPL CALC-MCNC: 82 MG/DL
NONHDLC SERPL-MCNC: 127 MG/DL
TRIGL SERPL-MCNC: 227 MG/DL

## 2021-11-26 PROCEDURE — 124N000002 HC R&B MH UMMC

## 2021-11-26 PROCEDURE — 250N000013 HC RX MED GY IP 250 OP 250 PS 637: Performed by: PSYCHIATRY & NEUROLOGY

## 2021-11-26 PROCEDURE — 80061 LIPID PANEL: CPT | Performed by: PSYCHIATRY & NEUROLOGY

## 2021-11-26 PROCEDURE — 99222 1ST HOSP IP/OBS MODERATE 55: CPT | Mod: AI | Performed by: CLINICAL NURSE SPECIALIST

## 2021-11-26 PROCEDURE — 99231 SBSQ HOSP IP/OBS SF/LOW 25: CPT | Performed by: INTERNAL MEDICINE

## 2021-11-26 PROCEDURE — 250N000013 HC RX MED GY IP 250 OP 250 PS 637: Performed by: CLINICAL NURSE SPECIALIST

## 2021-11-26 PROCEDURE — 36415 COLL VENOUS BLD VENIPUNCTURE: CPT | Performed by: PSYCHIATRY & NEUROLOGY

## 2021-11-26 RX ORDER — OLANZAPINE 5 MG/1
5-10 TABLET ORAL 3 TIMES DAILY PRN
Status: DISCONTINUED | OUTPATIENT
Start: 2021-11-26 | End: 2021-12-02 | Stop reason: HOSPADM

## 2021-11-26 RX ORDER — ARIPIPRAZOLE 10 MG/1
10 TABLET ORAL DAILY
Status: DISCONTINUED | OUTPATIENT
Start: 2021-11-27 | End: 2021-11-30

## 2021-11-26 RX ORDER — GABAPENTIN 100 MG/1
100 CAPSULE ORAL 3 TIMES DAILY PRN
Status: DISCONTINUED | OUTPATIENT
Start: 2021-11-26 | End: 2021-12-02 | Stop reason: HOSPADM

## 2021-11-26 RX ORDER — OLANZAPINE 10 MG/2ML
10 INJECTION, POWDER, FOR SOLUTION INTRAMUSCULAR 3 TIMES DAILY PRN
Status: DISCONTINUED | OUTPATIENT
Start: 2021-11-26 | End: 2021-12-02 | Stop reason: HOSPADM

## 2021-11-26 RX ADMIN — METOCLOPRAMIDE 10 MG: 10 TABLET ORAL at 17:27

## 2021-11-26 RX ADMIN — HYDROXYZINE HYDROCHLORIDE 50 MG: 50 TABLET, FILM COATED ORAL at 19:08

## 2021-11-26 RX ADMIN — OMEPRAZOLE 40 MG: 20 CAPSULE, DELAYED RELEASE ORAL at 08:53

## 2021-11-26 RX ADMIN — CHLORHEXIDINE GLUCONATE 15 ML: 1.2 SOLUTION ORAL at 19:07

## 2021-11-26 RX ADMIN — BENZTROPINE MESYLATE 0.5 MG: 0.5 TABLET ORAL at 08:53

## 2021-11-26 RX ADMIN — Medication 1000 UNITS: at 08:57

## 2021-11-26 RX ADMIN — LITHIUM CARBONATE 450 MG: 300 CAPSULE, GELATIN COATED ORAL at 21:01

## 2021-11-26 RX ADMIN — CALCIUM CARBONATE (ANTACID) CHEW TAB 500 MG 500 MG: 500 CHEW TAB at 16:10

## 2021-11-26 RX ADMIN — OLANZAPINE 10 MG: 5 TABLET, FILM COATED ORAL at 18:25

## 2021-11-26 RX ADMIN — VENLAFAXINE HYDROCHLORIDE 300 MG: 150 CAPSULE, EXTENDED RELEASE ORAL at 08:53

## 2021-11-26 RX ADMIN — HYDROXYZINE HYDROCHLORIDE 50 MG: 50 TABLET, FILM COATED ORAL at 08:53

## 2021-11-26 RX ADMIN — NORGESTIMATE AND ETHINYL ESTRADIOL 1 TABLET: KIT at 08:55

## 2021-11-26 RX ADMIN — DOCUSATE SODIUM 100 MG: 100 CAPSULE, LIQUID FILLED ORAL at 08:53

## 2021-11-26 RX ADMIN — CHLORHEXIDINE GLUCONATE 15 ML: 1.2 SOLUTION ORAL at 08:53

## 2021-11-26 RX ADMIN — DOCUSATE SODIUM 100 MG: 100 CAPSULE, LIQUID FILLED ORAL at 19:08

## 2021-11-26 RX ADMIN — BENZTROPINE MESYLATE 0.5 MG: 0.5 TABLET ORAL at 19:08

## 2021-11-26 RX ADMIN — POLYETHYLENE GLYCOL 3350 17 G: 17 POWDER, FOR SOLUTION ORAL at 08:53

## 2021-11-26 RX ADMIN — NALTREXONE HYDROCHLORIDE 50 MG: 50 TABLET, FILM COATED ORAL at 21:01

## 2021-11-26 RX ADMIN — ACETAMINOPHEN 650 MG: 325 TABLET, FILM COATED ORAL at 16:09

## 2021-11-26 NOTE — PLAN OF CARE
Pt has not attended scheduled occupational therapy sessions. Encourage attendance and participation. Pt will be given self-assessment form, and OT staff will explain the purpose of including them in their treatment plan and offer options for meeting their needs.

## 2021-11-26 NOTE — PROGRESS NOTES
CLINICAL NUTRITION SERVICES    Reviewed nutrition risk factors due to +MSTsreen for 2-13 lb weight loss and decreased appetite.  Pt is obese with BMI = 36 k/g/m2, weight loss of 20 lbs noted. Pt is tolerating diet, eating well per discussion with staff, and not appropriate for visit due to behavior at this time.   No nutrition issues identified at this time.     RD to sign off, please consult if needed.    Marleny Torres RD, LD  Unit pager [phone]
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
## 2021-11-26 NOTE — PLAN OF CARE
The patient specific goals include:   Patient will participate in unit programming  Patient will identify triggers and positive coping skills  Patient will take medications as prescribed by physician both for mental health and medical  Patient coached to work on coping packet    The patient identified the following reasons for hospitalization:  Suicidal Ideations  Self Harm    The patient identified the following goals for discharge:    Absence of Suicidal Ideations    Absence of Self Harm

## 2021-11-26 NOTE — ED NOTES
Sign out Provider: Sirena      Sign out Plan: Patient seen on earlier shift, evaluated and deemed in need of psychiatric admission.  Was medically cleared.  Is awaiting bed placement.      Reassessment: No events to date on this shift.      Disposition: Need to await inpatient bed placement.       Leo Lowe MD  11/25/21 2409

## 2021-11-26 NOTE — PLAN OF CARE
Initial Psychosocial Assessment    I have reviewed the chart, met with the patient, and developed Care Plan.      Patient Legal (Hospital) Status:  Voluntary    Presenting Problem:  Per ED: Sadaf Ross is a 21 year old female who was just discharged from the emergency department for behavioral issues and suicidal ideation.  Please refer to yesterday's note.  She reports that her father  and that she is missing him around Thanksgiving.  During her earlier visit, it was felt that she could be safely discharged as there was not much of a plan and it seemed more like attention seeking behavior.  When she got to her group home, she reported continued suicidal ideation and banged her head against a wall. She reports that she feels like she could kill herself by hitting her head against the wall or by biting herself.  She has no breaks in her skin.  She did not lose consciousness.    Mental health history: PMH includes; Bi-polar, MDD, BPD, ADHD, Intellectual disabilities. Significant history of inpatient hospitalizations and ED visits. Hx of SIB via head banging, cutting/scratching/biting self. Hx of aggression towards others. Pt receives waivered services and has multiple supports in place in the community. Pt has a hx of DBT.     Chemical use history: Denies. Nothing documented.     Family Description (Constellation, Family Psychiatric History):  Patient's father passed away in February. Reportedly does have family including her mom and sister who reportedly do not communicate with her. Pt currently has a boyfriend. She has no kids. No noted family hx of MH or CD issues.     Significant Life Events (Illness, Abuse, Trauma, Death):  Pt's father passed away in 2021, Her sister passed away in .    Living Situation:  Patient resides in a Group home  [address]   Supervisor: Arlene [phone]    Educational Background:  High school    Occupational  History:  Patient is Unemployed    Financial Status:  SSDI    Legal Issues:  Patient denies     Ethnic/Cultural Issues:  Denies.     Spiritual Orientation:  None identified     Service History:  Denies    Current Treatment Providers are:  Psychiatric Provider:  @ [facility]  Therapist: Laina @ [facility]  : Jyoti Montenegror @ [facility] [phone]  CADI : Judy @ [facility] [phone]  Skills Worker: Carlos @ [facility] Care [address] KNUAL de la rosa. [address] [place] [address] [phone]    Social Service Assessment/Social Functioning/Plan:  Patient has been admitted for Suicidal Ideations. Patient will have psychiatric assessment and medication management by the psychiatrist. Medications will be reviewed and adjusted per MD as indicated. The treatment team will continue to assess and stabilize the patient's mental health symptoms with the use of medications and therapeutic programming. Hospital staff will provide a safe environment and a therapeutic milieu. Staff will continue to assess patient as needed. Patient will participate in unit groups and activities. Patient will receive individual and group support on the unit.  CTC will do individual inpatient treatment planning and after care planning. CTC will discuss options for increasing community supports with the patient. CTC will coordinate with outpatient providers and will place referrals to ensure appropriate follow up care is in place.  Patient would benefit from: Return to group home and current services.

## 2021-11-26 NOTE — PROGRESS NOTES
11/25/21 2222   Valuables   Patient Belongings remains with patient;locker;sent to security per site process   Patient Belongings Remaining with Patient ring   Patient Belongings Put in Hospital Secure Location (Security or Locker, etc.) bracelet;cash/credit card;cell phone/electronics;clothing;money (see comment);medication(s);glasses;plastic bag;purse/wallet;shoes;wallet;other (see comments)   Did you bring any home meds/supplements to the hospital?  Yes     Sent to security (envelope 743343): $1.51 in coins, CSL card [phone], [employer] card [phone], [employer] card [phone], Visa 1187, Visa 4284, Visa [phone]    Given to RN (envelope 582766): 2 bags of cough drops, bottle of tums, 2 bottles of OTC tylenol, 1 bottle of OTC ibuprofen, 1 bottle of presecribed ibuprofen, 8 doses of zofran, 1 box of cough drops    Remains with patient: ring    In locker: Wallet with several rewards cards, several business cards, [place] ID, several insurance cards, social security card, shorts, socks, underwear, tank top, bra, t-shirt, backpack, hand , hat, bandana, gloves, blanket, plastic bag, beaded bracelet, leather bracelet, slippers, hooded sweatshirt, vibrator, charging block, 2 pairs headphones (corded and cordless) with case, 2 packages of cigarettes, 3 lighters, 2 chapsticks, aromatherapy inhaler, appt reminder cards, paper calendar, 5 suckers, 1 mini snickers, pack of gum, Listerine strips, 2 mini bottles of lotion, pen with stylis, fidget spinner, screen cloth with spray, cloth mask, sunglasses, 2 barf bags, charging cord, crystal keychain    A               Admission:  I am responsible for any personal items that are not sent to the safe or pharmacy.  [facility] is not responsible for loss, theft or damage of any property in my possession.    Signature:  _________________________________ Date: _______  Time: _____                                              Staff Signature:  ____________________________ Date: ________  Time: _____       2nd Staff person, if patient is unable/unwilling to sign:    Signature: ________________________________ Date: ________  Time: _____     Discharge:  [facility] has returned all of my personal belongings:    Signature: _________________________________ Date: ________  Time: _____                                          Staff Signature:  ____________________________ Date: ________  Time: _____

## 2021-11-26 NOTE — PROGRESS NOTES
Pt appeared to have slept for 7 hours.  Breathing is even and unlabored.  No medical concerns/prns this shift.  Remains on SIO with 5 feet restrictions for SIB and aggression.  Continues on suicide,sib and assault precautions.

## 2021-11-26 NOTE — H&P
"Admitted: 2021    CHIEF COMPLAINT:  Evaluation for suicidal ideation and self-injurious behavior urges.    IDENTIFYING INFORMATION:  Sadaf Ross is a 21-year-old single  female presenting with a history of borderline personality disorder, bipolar disorder, developmental delay and suicidal ideation.    HISTORY OF PRESENT ILLNESS:  Sadaf Ross is a 21-year-old single  female presenting with a history of borderline personality disorder, bipolar disorder, developmental delay and presenting with suicidal ideation.  The patient reports increasing thoughts of suicidal thinking since her father  last February.  The patient stated that she has pain all over his body since February, when her father .  The patient was unable to describe her pain or where her pain was located.  The patient states \"I am going to bite myself to death.\"  The patient reports \"I will never get better.\"  The patient has reported diffuse abdominal pain, has been in and out of multiple EDs for abdominal pain.  The patient lives in a group home and has been calling to get into multiple EDs.  The patient was assessed at [facility] ED and was discharged.  The patient came to [facility] ED the same day for abdominal workup.  The patient's abdominal workup was negative.  The patient reports that she is lonely.  The patient states everyone was discharged from her group home.  The patient states that she does not have any friends.  The patient states the last time she talked with a friend was in February, when her father .  Goal for this hospitalization is stabilization.    PSYCHIATRIC REVIEW OF SYSTEMS:  The patient reports that she sleeps all day.  She is lonely.  She is depressed.  The patient states she wants to kill herself.  The patient reported that she wanted to kill herself multiple times.  She is feeling hopeless and helpless.  The patient denies homicidal ideation.  The patient does not endorse any " symptoms of monae.  Does not endorse any symptoms of psychosis, including auditory or visual hallucinations or feelings of paranoia.  The patient states her anxiety is high.  The patient states that when she is anxious, she hits her head or bites her hand.  The patient does not endorse any symptoms of PTSD, eating disorder or OCD.    PSYCHIATRIC HISTORY:  The patient was hospitalized at [facility] in 07/2021 for similar symptoms.  The patient has been in [facility].  The patient is currently being treated with Abilify Maintena 400 mg (first injection is due on 12/04/2021), Cogentin 0.5 mg b.i.d., Peridex 15 mL, Colace 100 mg b.i.d., hydroxyzine 50 mg b.i.d., lithium 450 mg at bedtime, naltrexone 50 mg, omeprazole 40 mg, MiraLax 17 grams, venlafaxine 300 mg, vitamin D3 at 1000 units.  The patient has not been cooperative with taking her oral medications while at group home.    PAST MEDICAL HISTORY:  The patient has been reporting abdominal pain.  ED workups have been negative.      REVIEWED ADMISSION LABS.  HCG negative.  COVID screen negative.  Lipid panel:  HDL 44 (low), triglycerides 227 (elevated).  On 11/24/2021 CMP within normal limits, CBC with differential within normal limits.    ALLERGIES:  PENICILLINS.    FAMILY HISTORY:  The patient states her mother is avoiding her.  The patient has a sister.  The patient has some contact with sister.  The patient is unaware of any mental health issues in her family.    SOCIAL HISTORY:  The patient lives in group home in [place].  She has been there about a year.  Prior to that, she was at [facility] for 3 months.  The patient a high school graduate from [place], and she had an IEP.    MEDICAL REVIEW OF SYSTEMS:  A complete review of systems was performed.  Pertinent positives and negatives noted in HPI and all other systems negative as documented by Susie Esqueda DO, dated 11/24/2021.  No changes noted.    PHYSICAL EXAMINATION:    VITAL SIGNS:  Blood pressure 158/90,  pulse 96, respirations 18.  Height 5 feet 4 inches, weight 218 pounds 14.4 ounces.  SpO2 at 100%.    Reviewed documentation for physical examination completed by Susie Esqueda DO, dated 11/24/2021.  No changes are noted.    MENTAL STATUS EXAMINATION:  The patient appears her stated age.  She is dressed in scrubs.  She has shaved her head.  Adequate hygiene.  The patient was somewhat cooperative in meeting with provider in the interview room.  The patient became agitated very quickly with questions.  The patient did not appear to know the answers and became agitated.  Eye contact poor.  The patient had her head in her hands.  She did not display psychomotor abnormalities.  Speech was soft in the beginning of the interview, but patient started yelling at provider due to frustration from not knowing answers to questions.  She was very guarded.  Mood is anxious and depressed.  Affect heightened.  The patient was somewhat tearful.  Thought process was linear but very concrete.  Associations intact.  Thought content did not display evidence of psychosis.  She is endorsing passive suicidal thinking and urges for self-injurious behavior of hitting her head or biting her hands.  She denies homicidal ideation.  Insight and judgment are impaired.  Cognition appears intact to interviewing, including orientation to person, place, time, and situation, use of language and fund of knowledge.  Recent and remote memory are grossly intact.  Muscle strength, tone, and gait within normal limits upon observation.    ASSESSMENT:    1.  Borderline personality disorder.  2.  History of bipolar disorder.  3.  Intellectual disability.    PLAN:    1.  The patient has been admitted to behavioral unit 4A on a voluntary basis.  2.  Discussed medications with the patient.  The patient was not cooperative in discussing medications.  Continuing Abilify; will start oral medication, since Abilify Maintena will not be able to be started here in the  hospital.  We will continue Cogentin, Colace, hydroxyzine, lithium, naltrexone, omeprazole, MiraLax, venlafaxine and vitamin D. We discussed risks, benefits, and side effects of medication with the patient.  3.  Psychosocial treatments to be addressed with CTC.  The patient will benefit from more socialization.  The patient states she is lonely.  4.  Estimated length of stay is 3-5 days.    Debra A. Naegele, APRN, CNS        D: 2021   T: 2021   MT: MELISSAMT    Name:     ARIANE TILLMANKarina  MRN:      [id number]        Account:     [id number]   :      2000           Admitted:    2021       Document: [id number]

## 2021-11-26 NOTE — PLAN OF CARE
Problem: General Plan of Care (Inpatient Behavioral)  Goal: Individualization/Patient Specific Goal (IP Behavioral)  Description: The patient and/or their representative will achieve their patient-specific goals related to the plan of care.    The patient-specific goals include:  Flowsheets (Taken 11/26/2021 1437)  Areas of Vulnerability: suicide attempt(s), helplessness/hopelessness, recent death of loved one and impulsivity/recklessness, intellectual disability.  Patient Strengths:   Stable housing   Involved in community

## 2021-11-26 NOTE — PLAN OF CARE
"Pt slept in this morning. When she did wake up she was very labile. Pt would come out to the sylvester complaining of stomach pain. She was on the ground on all four limbs making painful expressions. Writer confronted her and asked her if she would like any prns for pain and she stated \"no.\" Pt also refused cold packs or warm. Writer suggested that pt lay in her bed if she is not feeling well but pt ignores writers suggestions. Writer also suggested that pt take a shower due to her smelling malodorous. During check in pt continues to endorse chronic SI and SIB with no plans. Pt endorses AH but denies VH and HI.   "

## 2021-11-26 NOTE — ED NOTES
Pt was calm at the beginning of the shift but then later on got restless and started biting her hand, no break in the skin, accepted oral Zyprexa.  Pt has been sleeping since Zyprexa given. Pt continues to have a sitter.

## 2021-11-27 PROCEDURE — 250N000013 HC RX MED GY IP 250 OP 250 PS 637: Performed by: CLINICAL NURSE SPECIALIST

## 2021-11-27 PROCEDURE — 124N000002 HC R&B MH UMMC

## 2021-11-27 PROCEDURE — 250N000013 HC RX MED GY IP 250 OP 250 PS 637: Performed by: PSYCHIATRY & NEUROLOGY

## 2021-11-27 RX ADMIN — ARIPIPRAZOLE 10 MG: 10 TABLET ORAL at 09:24

## 2021-11-27 RX ADMIN — CHLORHEXIDINE GLUCONATE 15 ML: 1.2 SOLUTION ORAL at 21:02

## 2021-11-27 RX ADMIN — LITHIUM CARBONATE 450 MG: 300 CAPSULE, GELATIN COATED ORAL at 21:02

## 2021-11-27 RX ADMIN — NALTREXONE HYDROCHLORIDE 50 MG: 50 TABLET, FILM COATED ORAL at 21:02

## 2021-11-27 RX ADMIN — DOCUSATE SODIUM 100 MG: 100 CAPSULE, LIQUID FILLED ORAL at 09:23

## 2021-11-27 RX ADMIN — VENLAFAXINE HYDROCHLORIDE 300 MG: 150 CAPSULE, EXTENDED RELEASE ORAL at 09:24

## 2021-11-27 RX ADMIN — NORGESTIMATE AND ETHINYL ESTRADIOL 1 TABLET: KIT at 09:25

## 2021-11-27 RX ADMIN — Medication 1000 UNITS: at 09:24

## 2021-11-27 RX ADMIN — CHLORHEXIDINE GLUCONATE 15 ML: 1.2 SOLUTION ORAL at 09:24

## 2021-11-27 RX ADMIN — OLANZAPINE 10 MG: 5 TABLET, FILM COATED ORAL at 17:00

## 2021-11-27 RX ADMIN — POLYETHYLENE GLYCOL 3350 17 G: 17 POWDER, FOR SOLUTION ORAL at 09:24

## 2021-11-27 RX ADMIN — BENZTROPINE MESYLATE 0.5 MG: 0.5 TABLET ORAL at 21:02

## 2021-11-27 RX ADMIN — BENZTROPINE MESYLATE 0.5 MG: 0.5 TABLET ORAL at 09:24

## 2021-11-27 RX ADMIN — HYDROXYZINE HYDROCHLORIDE 50 MG: 50 TABLET, FILM COATED ORAL at 21:02

## 2021-11-27 RX ADMIN — HYDROXYZINE HYDROCHLORIDE 50 MG: 50 TABLET, FILM COATED ORAL at 09:24

## 2021-11-27 RX ADMIN — OMEPRAZOLE 40 MG: 20 CAPSULE, DELAYED RELEASE ORAL at 09:25

## 2021-11-27 RX ADMIN — DOCUSATE SODIUM 100 MG: 100 CAPSULE, LIQUID FILLED ORAL at 21:02

## 2021-11-27 NOTE — PLAN OF CARE
"  Problem: Suicidal Behavior  Goal: Suicidal Behavior is Absent or Managed  Outcome: No Change   /79 (BP Location: Left arm, Patient Position: Sitting)   Pulse 107   Temp 98  F (36.7  C) (Oral)   Resp 14   Ht 1.626 m (5' 4\")   Wt 95.6 kg (210 lb 12.8 oz)   SpO2 100%   BMI 36.18 kg/m  Pt was c/o stomach ache gave Tums and tylenol and Reglan around 1600 and it was not effective . After dinner pt was c/o chest pain paged MD and came to see her see his note. Pt was crying hard before the MD came to see her and gave Zyprexa 10mg. Around 1900 pt took her meds and went to bed. Pt endorse SI and AH with command voice telling her to kill herself, denied having a command AH to harm others. Pt was complaint taking medications. Pt is on SIO.     "

## 2021-11-27 NOTE — PLAN OF CARE
"  Problem: Suicide Risk  Goal: Absence of Self-Harm  Outcome: No Change     Pt was mostly isolative and withdrawn this shift. Before dinner, pt began crying in her room and was lightly head banging and biting her arm. No marks noted on arms or head. Pt stated she misses her dad and is sad he is not here for the holidays. Pt stated \"I just try to kill myself by biting myself or banging my head to just get it over with.\" PRN zyprexa offered and accepted at this time. While pt was crying, pt stated her chest is hurting. Pt stated \"it's because I can't drink milk anymore i'm lactose intolerant.\" Pt showered and ate dinner. Pt medication compliant. Pt went to sleep early with no concerns. Will continue to monitor.  "

## 2021-11-27 NOTE — PROGRESS NOTES
"I was asked by RN to evaluate Sadaf Ross who is complaining of chest pain.    Sadaf says she is sad about her father who passed away in February 2021.    She is tearful and very emotional.    She endorses diffuse substernal chest pain, nonradiating.  She has nausea but no emesis.  No associated diaphoresis or shortness of breath.  Chest pain reproducible and gets worse with deep inspiration.  She has had similar CP in the past when she was sad.    No CAD risk factors except for smoking history.    /79 (BP Location: Left arm, Patient Position: Sitting)   Pulse 107   Temp 98  F (36.7  C) (Oral)   Resp 14   Ht 1.626 m (5' 4\")   Wt 95.6 kg (210 lb 12.8 oz)   SpO2 100%   BMI 36.18 kg/m    General:   Alert and oriented x3, tearful, no apparent distress  Cardiovascular:   Normal S1-S2, no murmurs rubs or gallops  Lungs:   Clear to auscultation bilateral    Assessment/plan:   Patient's chest pain is noncardiac in etiology.  Continue current care. She may benefit anxiolytic med.        Carlos Reed MD.   Hospitalist.  [phone], pager.    "

## 2021-11-27 NOTE — PLAN OF CARE
Focus: Shift summary    Data: Patient slept 6 hours last night. Awake intermittently throughout the night in room;  No interventions needed. SIO remains within 5 ft at this time. Respirations even and unlabored on status 15 checks. Will continue to monitor and report to oncoming staff.    Response: Report sleep hours to day shift. Continue to monitor patient and provide therapeutic interventions as necessary.

## 2021-11-27 NOTE — PLAN OF CARE
Pt isolated in her room for the majority of the morning and afternoon. She came out a few times and sat quietly by herself. She is withdrawn and does not communicate with others when in the milieu. This afternoon pt was in her room biting her hand. It took staff about 30 minutes to redirect pt from hiding her head under covers and biting at herself. Writer looked at pt hand and no marks are seen. Pt was medication compliant. Pt currently endorses SI without a plan and SIB without a plan. Pt denies AH, VH, and HI. Will continue to monitor.

## 2021-11-28 PROCEDURE — 124N000002 HC R&B MH UMMC

## 2021-11-28 PROCEDURE — 250N000013 HC RX MED GY IP 250 OP 250 PS 637: Performed by: PSYCHIATRY & NEUROLOGY

## 2021-11-28 PROCEDURE — 250N000013 HC RX MED GY IP 250 OP 250 PS 637: Performed by: CLINICAL NURSE SPECIALIST

## 2021-11-28 RX ADMIN — NALTREXONE HYDROCHLORIDE 50 MG: 50 TABLET, FILM COATED ORAL at 21:13

## 2021-11-28 RX ADMIN — CHLORHEXIDINE GLUCONATE 15 ML: 1.2 SOLUTION ORAL at 08:21

## 2021-11-28 RX ADMIN — HYDROXYZINE HYDROCHLORIDE 50 MG: 50 TABLET, FILM COATED ORAL at 19:53

## 2021-11-28 RX ADMIN — DOCUSATE SODIUM 100 MG: 100 CAPSULE, LIQUID FILLED ORAL at 19:53

## 2021-11-28 RX ADMIN — LITHIUM CARBONATE 450 MG: 300 CAPSULE, GELATIN COATED ORAL at 21:13

## 2021-11-28 RX ADMIN — DOCUSATE SODIUM 100 MG: 100 CAPSULE, LIQUID FILLED ORAL at 08:21

## 2021-11-28 RX ADMIN — VENLAFAXINE HYDROCHLORIDE 300 MG: 150 CAPSULE, EXTENDED RELEASE ORAL at 08:21

## 2021-11-28 RX ADMIN — NORGESTIMATE AND ETHINYL ESTRADIOL 1 TABLET: KIT at 08:22

## 2021-11-28 RX ADMIN — POLYETHYLENE GLYCOL 3350 17 G: 17 POWDER, FOR SOLUTION ORAL at 08:21

## 2021-11-28 RX ADMIN — OLANZAPINE 10 MG: 5 TABLET, FILM COATED ORAL at 16:49

## 2021-11-28 RX ADMIN — HYDROXYZINE HYDROCHLORIDE 50 MG: 50 TABLET, FILM COATED ORAL at 08:21

## 2021-11-28 RX ADMIN — METOCLOPRAMIDE 10 MG: 10 TABLET ORAL at 17:04

## 2021-11-28 RX ADMIN — BENZTROPINE MESYLATE 0.5 MG: 0.5 TABLET ORAL at 08:21

## 2021-11-28 RX ADMIN — Medication 1000 UNITS: at 08:21

## 2021-11-28 RX ADMIN — OMEPRAZOLE 40 MG: 20 CAPSULE, DELAYED RELEASE ORAL at 08:21

## 2021-11-28 RX ADMIN — BENZTROPINE MESYLATE 0.5 MG: 0.5 TABLET ORAL at 19:53

## 2021-11-28 RX ADMIN — CHLORHEXIDINE GLUCONATE 15 ML: 1.2 SOLUTION ORAL at 19:53

## 2021-11-28 RX ADMIN — ARIPIPRAZOLE 10 MG: 10 TABLET ORAL at 08:21

## 2021-11-28 ASSESSMENT — ACTIVITIES OF DAILY LIVING (ADL)
HYGIENE/GROOMING: INDEPENDENT
ORAL_HYGIENE: INDEPENDENT
DRESS: INDEPENDENT

## 2021-11-28 ASSESSMENT — MIFFLIN-ST. JEOR: SCORE: 1696

## 2021-11-28 NOTE — PLAN OF CARE
"  Problem: Suicidal Behavior  Goal: Suicidal Behavior is Absent or Managed  11/28/2021 1758 by Josefa Singer, RN  Outcome: No Change  Flowsheets (Taken 11/28/2021 1758)  Mutually Determined Action Steps (Suicidal Behavior Absent/Managed): shares suicidal thoughts   ../84 (BP Location: Right arm, Patient Position: Sitting)   Pulse 97   Temp 97.4  F (36.3  C) (Oral)   Resp 14   Ht 1.626 m (5' 4\")   Wt 95.1 kg (209 lb 10.5 oz)   SpO2 98%   BMI 35.99 kg/m   pt was c/o stomach ache gave Reglan 10mg before dinner and it was not effective. Good appetite and fluid intake. Pt said she was taking antibiotic for UTI before her admission and she was c/o burning sensation during urination and  at night, no temp. Paged the provider and got an order for UA, but unable to get urine sample pt refused to void.  Pt verbalized that if she doesn't have SIO she will harm herself. Pt endorse SI  and SIB with a plan to bang her head to a glass window. Pt said she can hear her father non command voice. C/o feeling depressed. Gave Zyprexa 10mg around 1700.  Pt was compliant with her meds   "

## 2021-11-28 NOTE — PROGRESS NOTES
Patient has been awake intermittently. Patient offered sleep aid medication and they refused. Patient slept for 4.5 hours. No safety concerns at this time.

## 2021-11-28 NOTE — PLAN OF CARE
Sadaf was seen more in the milieu today. She continues to be on a 1:1 for safety. She states that she doesn't feel she can remain safe without the SIO. She ate her breakfast and lunch today. Pt was medication compliant. At lunch time told her SIO staff to take her fork and spoon because she did not feel she could be safe around it. Pt continues to endorse chronic SI without a plan. She also endorses SIB without a plan. Pt denies AH, VH, and HI. Will continue to monitor.

## 2021-11-29 LAB
ALBUMIN UR-MCNC: 10 MG/DL
APPEARANCE UR: CLEAR
BACTERIA #/AREA URNS HPF: ABNORMAL /HPF
BILIRUB UR QL STRIP: NEGATIVE
COLOR UR AUTO: YELLOW
GLUCOSE UR STRIP-MCNC: NEGATIVE MG/DL
HGB UR QL STRIP: NEGATIVE
KETONES UR STRIP-MCNC: NEGATIVE MG/DL
LEUKOCYTE ESTERASE UR QL STRIP: NEGATIVE
MUCOUS THREADS #/AREA URNS LPF: PRESENT /LPF
NITRATE UR QL: NEGATIVE
PH UR STRIP: 5.5 [PH] (ref 5–7)
RBC URINE: 1 /HPF
SP GR UR STRIP: 1.03 (ref 1–1.03)
SQUAMOUS EPITHELIAL: 5 /HPF
UROBILINOGEN UR STRIP-MCNC: NORMAL MG/DL
WBC URINE: 1 /HPF

## 2021-11-29 PROCEDURE — 99233 SBSQ HOSP IP/OBS HIGH 50: CPT | Performed by: CLINICAL NURSE SPECIALIST

## 2021-11-29 PROCEDURE — 124N000002 HC R&B MH UMMC

## 2021-11-29 PROCEDURE — 99207 PR CDG-MDM COMPONENT: MEETS HIGH - UP CODED: CPT | Performed by: CLINICAL NURSE SPECIALIST

## 2021-11-29 PROCEDURE — 250N000013 HC RX MED GY IP 250 OP 250 PS 637: Performed by: CLINICAL NURSE SPECIALIST

## 2021-11-29 PROCEDURE — 81003 URINALYSIS AUTO W/O SCOPE: CPT | Performed by: NURSE PRACTITIONER

## 2021-11-29 PROCEDURE — 250N000013 HC RX MED GY IP 250 OP 250 PS 637: Performed by: PSYCHIATRY & NEUROLOGY

## 2021-11-29 RX ADMIN — Medication 1000 UNITS: at 10:34

## 2021-11-29 RX ADMIN — DOCUSATE SODIUM 100 MG: 100 CAPSULE, LIQUID FILLED ORAL at 19:04

## 2021-11-29 RX ADMIN — POLYETHYLENE GLYCOL 3350 17 G: 17 POWDER, FOR SOLUTION ORAL at 10:34

## 2021-11-29 RX ADMIN — CALCIUM CARBONATE (ANTACID) CHEW TAB 500 MG 500 MG: 500 CHEW TAB at 11:42

## 2021-11-29 RX ADMIN — BENZTROPINE MESYLATE 0.5 MG: 0.5 TABLET ORAL at 10:33

## 2021-11-29 RX ADMIN — OMEPRAZOLE 40 MG: 20 CAPSULE, DELAYED RELEASE ORAL at 10:34

## 2021-11-29 RX ADMIN — VENLAFAXINE HYDROCHLORIDE 300 MG: 150 CAPSULE, EXTENDED RELEASE ORAL at 10:35

## 2021-11-29 RX ADMIN — CHLORHEXIDINE GLUCONATE 15 ML: 1.2 SOLUTION ORAL at 10:34

## 2021-11-29 RX ADMIN — NORGESTIMATE AND ETHINYL ESTRADIOL 1 TABLET: KIT at 10:34

## 2021-11-29 RX ADMIN — NALTREXONE HYDROCHLORIDE 50 MG: 50 TABLET, FILM COATED ORAL at 21:33

## 2021-11-29 RX ADMIN — BENZTROPINE MESYLATE 0.5 MG: 0.5 TABLET ORAL at 19:04

## 2021-11-29 RX ADMIN — OLANZAPINE 10 MG: 5 TABLET, FILM COATED ORAL at 17:10

## 2021-11-29 RX ADMIN — DOCUSATE SODIUM 100 MG: 100 CAPSULE, LIQUID FILLED ORAL at 10:34

## 2021-11-29 RX ADMIN — LITHIUM CARBONATE 450 MG: 300 CAPSULE, GELATIN COATED ORAL at 21:33

## 2021-11-29 RX ADMIN — HYDROXYZINE HYDROCHLORIDE 50 MG: 50 TABLET, FILM COATED ORAL at 19:04

## 2021-11-29 RX ADMIN — ARIPIPRAZOLE 10 MG: 10 TABLET ORAL at 10:33

## 2021-11-29 RX ADMIN — CHLORHEXIDINE GLUCONATE 15 ML: 1.2 SOLUTION ORAL at 19:04

## 2021-11-29 RX ADMIN — HYDROXYZINE HYDROCHLORIDE 50 MG: 50 TABLET, FILM COATED ORAL at 10:34

## 2021-11-29 RX ADMIN — GABAPENTIN 100 MG: 100 CAPSULE ORAL at 18:05

## 2021-11-29 ASSESSMENT — ACTIVITIES OF DAILY LIVING (ADL)
HYGIENE/GROOMING: INDEPENDENT
DRESS: INDEPENDENT
ORAL_HYGIENE: INDEPENDENT

## 2021-11-29 NOTE — PLAN OF CARE
Pt remains on her 1:1 sio at this time due to safety concerns. She has threatened to hurt herself if staff tries to remove it. Pt did not make any self harm comments this morning or afternoon. She did eat her breakfast and lunch. At times she plays cards with staff but is withdrawn with her peers and does not communicate with them. She continues to endorse chronic SI and SIB without a plan. She denies AH, VH, and HI. Will continue to monitor.
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
## 2021-11-29 NOTE — PROGRESS NOTES
Pt told staff that she used to hide plastic knifes under her bed when she was on psych units in the past and she feels currently unsafe around utensils. Therefore, a no utensil tray should be considered.

## 2021-11-29 NOTE — PLAN OF CARE
Assessment/Intervention/Current Symptoms and Care Coordination  -Attended team reviewed chart.   -Spoke to pt's Jyoti HOUSTON @ [facility] [phone]. Provided update on pt status/progress. Jyoti will be visiting patient tomorrow @ 1:00pm on the unit.   -CTC informed pt of visit. Pt reported that she has a court date tomorrow at 1:30.  -CTC called Jyoti back. Jyoti said that she is not aware of a court date tomorrow and doesn't see anything in the system about an upcoming court date for pt. CM, Jyoti will visit tomorrow at 11:00am instead.       Discharge Plan or Goal: Return to group home with current services.   Psychiatric Provider:  @ [facility]  Therapist: Laina @ [facility]  : Jyoti Malik @ [facility] [phone]  CADI : Judy @ [facility] [phone]  Skills Worker: Carlos @ [facility] advanced behavioral Care [address] [facility] rj. [address] [place] [address] [phone]      Barriers to Discharge: Safety and symptom stabilization.         Referral Status: None          Legal Status: Voluntary

## 2021-11-29 NOTE — PLAN OF CARE
BEHAVIORAL TEAM DISCUSSION    Participants: 4A Provider: Debra Naegele, APRN, CNS; 4A RN's:  Stevo Henao, RN; 4A CTC's:  MARIANNE Carias, (CTC).  Progress:  Continuing to Assess .  Continued Stay Criteria/Rationale: New Patient  Medical/Physical: Obesity, Significant hx of Psychosomatic complaints  Precautions:    Behavioral Orders   Procedures    Assault precautions    Code 1 - Restrict to Unit    Routine Programming     As clinically indicated    Self Injury Precaution     Head banging, biting    Status 15     Every 15 minutes.    Status Individual Observation     Patient SIO status reviewed with team/RN.  Please also refer to RN/team documentation for add'l detail.    -SIO staff to monitor following which have contributed to patient being on SIO:  Pt on 1:1 at group home due to aggression, SIB, and impulsivity  -Possible interventions SIO staff could use to support patient's treatment progress:  Encourage coping skills, monitor for safety  -When following observed, team will review discontinuation of SIO:  Pt exhibiting safe behaviors on the unit.     Order Specific Question:   CONTINUOUS 24 hours / day     Answer:   Other     Order Specific Question:   Specify distance     Answer:   10 feet     Order Specific Question:   Indications for SIO     Answer:   Self-injury risk     Order Specific Question:   Indications for SIO     Answer:   Suicide risk    Suicide precautions     Patients on Suicide Precautions should have a Combination Diet ordered that includes a Diet selection(s) AND a Behavioral Tray selection for Safe Tray - with utensils, or Safe Tray - NO utensils       Plan: CTC will meet with pt to complete psychosocial assessment. CTC will coordinate disposition and after care planning.  The following services will be provided to the patient; psychiatric assessment, medication management, therapeutic milieu, individual and group support, art therapy, and skills/OT groups.   Rationale for change in  precautions or plan: No Change.

## 2021-11-29 NOTE — CONSULTS
Internal Medicine Consult - Initial Visit       Sadaf Ross MRN# [id number]   YOB: 2000 Age: 21 year old   Date of Admission: 11/25/2021  PCP: [facility]  Date of Service: 11/29/2021    Referring Provider: Johnson Orozco MD  Reason for Consult: Dysuria          Assessment and Recommendations:   Sadaf Ross is a 21 year old female with a history of bipolar 1 disorder, borderline personality disorder, depression, intellectual disability, and chronic suicidal ideations admitted to [facility] for SI and self injurious behavior urges.      # SI, SIB urges  # Bipolar 1 disorder  # Borderline personality disorder   # Depression   Per chart review, admitted to [facility] in 7/2021 w/ similar symptoms.    - Management per Psychiatry     # Dysuria - Pt reports dysuria, reportedly ongoing since 7/2021. Was told this summer her sx were confirmed to be from a UTI, was prescribed abx but somewhat unclear why she did not take these. Reports hematuria 2 days ago, but not c/w UA results.  UA from 11/29/2021 reviewed, does not appear c/w UTI.  No UC collected. Urine hCG negative on 11/25/21, so pregnancy less likely.  - Check wet prep   - Check urine G/C   - Ensure personal hygiene routines   - Encourage adequate hydration   - Monitor for new/recurrent symptoms    Medicine will continue to follow along peripherally for results of wet prep and urine G/C.  Recommendations relayed to primary team via this progress note.  Thank you for the opportunity to be involved in this patient's care.    Meche Galicia, CNP, APRN  Internal Medicine [facility] Hospitalist  [facility]  Pager [phone]           History of Present Illness:   History is obtained from the patient and medical record.     This patient is a 21 year old female with a history of bipolar 1 disorder, borderline personality disorder, depression, intellectual disability, and chronic suicidal ideations admitted to station [facility]  "for SI and self injurious behavior urges.      Internal Medicine service was asked to see patient for complaints of dysuria.  Sadaf notes that her symptoms began in July 2021. She states that at that time she was diagnosed with a UTI and prescribed antibiotics, but unclear why she never took them. Then, two days ago, she noticed that her urine was \"pinkish\" which was new for her. She is concerned that this is blood in her urine.  She does note that her symptoms have been off and on since her father passed away earlier this year, and reports having a lot of anxiety around this as well.      As far as her LMP goes, reports that her cycles are generally consistent and characterized by light spotting. However, her most recent LMP (mid-October, ~6 weeks ago) was \"really heavy\" which was unusual for her. This stopped as her periods usually do. Does not elaborate further on details. Most recent sexual contacts were after her LMP, at which time she was engaging with males and females. With males, was using condoms. She also takes daily oral contraception. Denies the chance of pregnancy. Further denies vaginal discharge, ongoing vaginal bleeding, fevers, new chills (reports this is a chronic issue), new abdominal pain (chronic issue for her).  Notes some constipation today.            Review of Systems:   A 10 point ROS was performed and negative unless otherwise noted in HPI.           Past Medical History:   Reviewed and updated in Epic.  Past Medical History:   Diagnosis Date     ADHD (attention deficit hyperactivity disorder)      Bipolar 1 disorder (H)      Borderline personality disorder (H)      Depression      Depressive disorder      Intellectual disability      Obesity      Syncope              Past Surgical History:   Reviewed and updated in Epic.  Past Surgical History:   Procedure Laterality Date     APPENDECTOMY       APPENDECTOMY               Social History:   Reviewed and updated in Epic.  Social History "     Socioeconomic History     Marital status: Single     Spouse name: Not on file     Number of children: Not on file     Years of education: Not on file     Highest education level: Not on file   Occupational History     Not on file   Tobacco Use     Smoking status: Current Some Day Smoker     Years: 5.00     Types: Cigarettes     Smokeless tobacco: Never Used   Substance and Sexual Activity     Alcohol use: No     Drug use: No     Sexual activity: Yes     Partners: Female, Male     Birth control/protection: Pill   Other Topics Concern     Not on file   Social History Narrative     Not on file     Social Determinants of Health     Financial Resource Strain: Not on file   Food Insecurity: Not on file   Transportation Needs: Not on file   Physical Activity: Not on file   Stress: Not on file   Social Connections: Not on file   Intimate Partner Violence: Not on file   Housing Stability: Not on file              Family History:   Reviewed and updated in Epic.  Family History   Problem Relation Age of Onset     Diabetes Type 1 Father      Cancer Paternal Grandfather              Allergies:     Allergies   Allergen Reactions     Penicillins Rash and Unknown             Medications:     Current Facility-Administered Medications   Medication     acetaminophen (TYLENOL) tablet 650 mg     alum & mag hydroxide-simethicone (MAALOX) suspension 30 mL     ARIPiprazole (ABILIFY) tablet 10 mg     [START ON 12/4/2021] ARIPiprazole ER (ABILIFY MAINTENA) extended release inj syringe 400 mg     benztropine (COGENTIN) tablet 0.5 mg     calcium carbonate (TUMS) chewable tablet 500 mg     chlorhexidine (PERIDEX) 0.12 % solution 15 mL     docusate sodium (COLACE) capsule 100 mg     gabapentin (NEURONTIN) capsule 100 mg     hydrOXYzine (ATARAX) tablet 50 mg     lithium (ESKALITH) capsule 450 mg     metoclopramide (REGLAN) tablet 10 mg     naltrexone (DEPADE/REVIA) tablet 50 mg     norgestimate-ethinyl estradiol (ORTHO-CYCLEN) 0.25-35 MG-MCG  "per tablet 1 tablet     OLANZapine (zyPREXA) tablet 5-10 mg    Or     OLANZapine (zyPREXA) injection 10 mg     omeprazole (priLOSEC) CR capsule 40 mg     ondansetron (ZOFRAN) tablet 4 mg     polyethylene glycol (MIRALAX) Packet 17 g     traZODone (DESYREL) tablet 50 mg     venlafaxine (EFFEXOR-XR) 24 hr capsule 300 mg     Vitamin D3 (CHOLECALCIFEROL) tablet 1,000 Units            Physical Exam:   Blood pressure 123/80, pulse 97, temperature 97.9  F (36.6  C), temperature source Oral, resp. rate 14, height 1.626 m (5' 4\"), weight 95.1 kg (209 lb 10.5 oz), SpO2 98 %, not currently breastfeeding.  Body mass index is 35.99 kg/m .    GENERAL: Alert and oriented x 3. Well nourished, well developed.  No acute distress.    HEENT: Normocephalic, atraumatic. Anicteric sclera. Mucous membranes moist.   CV: RRR. S1, S2. No murmurs appreciated.   RESPIRATORY: Effort normal on room air. Lungs CTAB with no wheezing, rales, or rhonchi.   GI: Abdomen soft and non distended, bowel sounds present x all 4 quadrants. No masses, rebound or guarding. Mild tenderness to palpation throughout abdomen, more pronounced at suprapubic region.  NEUROLOGICAL: No focal deficits. Follows commands.    MUSCULOSKELETAL: No joint swelling. Moves all extremities.   EXTREMITIES: No gross deformities. No peripheral edema. Intact bilateral pedal pulses.   SKIN: Grossly warm, dry, and intact. No jaundice. No rashes.             Data:   I personally reviewed the following studies:    ROUTINE IP LABS (Last four results)  CMP Recent Labs   Lab 11/24/21 1902      POTASSIUM 4.2   CHLORIDE 109   CO2 28   ANIONGAP 3   GLC 95   BUN 11   CR 0.71   SAMIR 9.6   PROTTOTAL 7.8   ALBUMIN 4.0   BILITOTAL 0.3   ALKPHOS 50   AST 18   ALT 29     CBC   Recent Labs   Lab 11/24/21 1902   WBC 9.1   RBC 4.26   HGB 12.0   HCT 36.2   MCV 85   MCH 28.2   MCHC 33.1   RDW 12.5        INR No lab results found in last 7 days.        Unresulted Labs Ordered in the Past 30 " Days of this Admission     No orders found from 10/26/2021 to 11/26/2021.

## 2021-11-29 NOTE — PROGRESS NOTES
[facility], [place]   Psychiatric Progress Note        Interim History:   The patient's care was discussed with the treatment team during the daily team meeting and/or staff's chart notes were reviewed.  Staff report patient reports she will harm self by hitting head or biting self if she is not on 1:1.     Psychiatric symptoms and interventions:   Upon interview with patient she was irritable. She reports she is bored. Patient said she will kill herself if she goes to the group home. Patient started to swear at provider. Patient agitates quickly with questions. Patient reports she hears her father's voice. Father  in March. Patient comp;elva on not having any friends.     Patient has had a couple of bouts of crying throughout the weekend and today.   Patient is unreliable historian. She reports medial complaints of diffuse abdominal pain, chest which have been worked up as negative.     Care Plan: Discussed in treatment team that patient will remain on 1:1 due to high risk of self harm (head banging and biting her hand). 1:1 should be therapeutic not for socialization. Discuss coping skills. Suggest coping skills. Role model coping skills.          Medications:       ARIPiprazole  10 mg Oral Daily     [START ON 2021] ARIPiprazole ER  400 mg Intramuscular Q28 Days     benztropine  0.5 mg Oral BID     chlorhexidine  15 mL Swish & Spit BID     docusate sodium  100 mg Oral BID     hydrOXYzine  50 mg Oral BID     lithium  450 mg Oral At Bedtime     naltrexone  50 mg Oral At Bedtime     norgestimate-ethinyl estradiol  1 tablet Oral Daily     omeprazole  40 mg Oral QAM AC     polyethylene glycol  17 g Oral Daily     venlafaxine  300 mg Oral Daily     Vitamin D3  1,000 Units Oral Daily          Allergies:     Allergies   Allergen Reactions     Penicillins Rash and Unknown          Labs:   No results found for this or any previous visit (from the past 24 hour(s)).       Psychiatric  "Examination:     /84 (BP Location: Right arm, Patient Position: Sitting)   Pulse 97   Temp 97.4  F (36.3  C) (Oral)   Resp 14   Ht 1.626 m (5' 4\")   Wt 95.1 kg (209 lb 10.5 oz)   SpO2 98%   BMI 35.99 kg/m    Weight is 209 lbs 10.52 oz  Body mass index is 35.99 kg/m .  Orthostatic Vitals  Report    None            Appearance: awake, alert, adequately groomed and dressed in hospital scrubs  Attitude:  evasive and guarded  Eye Contact:  fair  Mood:  angry and anxious  Affect:  intensity is blunted  Speech:  paucity of speech  Psychomotor Behavior:  no evidence of tardive dyskinesia, dystonia, or tics  Throught Process:  illogical, concrete  Associations:  no loose associations  Thought Content:  passive suicidal ideation present and thoughts of self-harm, which are remained the same  Insight:  limited  Judgement:  limited  Oriented to:  time, person, and place  Attention Span and Concentration:  limited  Recent and Remote Memory:  intact    Clinical Global Impressions  First:     Most recent:            Precautions:     Behavioral Orders   Procedures     Assault precautions     Code 1 - Restrict to Unit     Routine Programming     As clinically indicated     Self Injury Precaution     Head banging, biting     Status 15     Every 15 minutes.     Status Individual Observation     Patient SIO status reviewed with team/RN.  Please also refer to RN/team documentation for add'l detail.    -SIO staff to monitor following which have contributed to patient being on SIO:  Pt on 1:1 at group home due to aggression, SIB, and impulsivity  -Possible interventions SIO staff could use to support patient's treatment progress:  Encourage coping skills, monitor for safety  -When following observed, team will review discontinuation of SIO:  Pt exhibiting safe behaviors on the unit.     Order Specific Question:   CONTINUOUS 24 hours / day     Answer:   Other     Order Specific Question:   Specify distance     Answer:   10 feet "     Order Specific Question:   Indications for SIO     Answer:   Self-injury risk     Order Specific Question:   Indications for SIO     Answer:   Suicide risk     Suicide precautions     Patients on Suicide Precautions should have a Combination Diet ordered that includes a Diet selection(s) AND a Behavioral Tray selection for Safe Tray - with utensils, or Safe Tray - NO utensils            DIagnoses:   1.  Borderline personality disorder.  2.  History of bipolar disorder.  3.  Intellectual disability.         Plan:   Legal status: Voluntary     Medication management:   The patient was not cooperative in discussing medications.  Continuing Abilify; will start oral medication, since Abilify Maintena will not be able to be started here in the hospital.  We will continue Cogentin, Colace, hydroxyzine, lithium, naltrexone, omeprazole, MiraLax, venlafaxine and vitamin D.     Medical: The patient has been reporting abdominal pain.  ED workups have been negative.       REVIEWED ADMISSION LABS.  HCG negative.  COVID screen negative.  Lipid panel:  HDL 44 (low), triglycerides 227 (elevated).  On 11/24/2021 CMP within normal limits, CBC with differential within normal limits.    Behavioral/psychology/social:   Encouraged patient to attend therapeutic hospital programming as tolerated     Disposition.   Reason for continued hospitalization: Patient reports she will harm self by hitting head or biting self.   Stabilization with medications, provider structured and supportive environment, groups  Estimated length of stay is 3-5 days   Return to group home.

## 2021-11-30 PROCEDURE — 250N000013 HC RX MED GY IP 250 OP 250 PS 637: Performed by: PSYCHIATRY & NEUROLOGY

## 2021-11-30 PROCEDURE — 250N000013 HC RX MED GY IP 250 OP 250 PS 637: Performed by: CLINICAL NURSE SPECIALIST

## 2021-11-30 PROCEDURE — 87491 CHLMYD TRACH DNA AMP PROBE: CPT | Performed by: NURSE PRACTITIONER

## 2021-11-30 PROCEDURE — 124N000002 HC R&B MH UMMC

## 2021-11-30 PROCEDURE — 99232 SBSQ HOSP IP/OBS MODERATE 35: CPT | Performed by: CLINICAL NURSE SPECIALIST

## 2021-11-30 RX ADMIN — HYDROXYZINE HYDROCHLORIDE 50 MG: 50 TABLET, FILM COATED ORAL at 19:37

## 2021-11-30 RX ADMIN — VENLAFAXINE HYDROCHLORIDE 300 MG: 150 CAPSULE, EXTENDED RELEASE ORAL at 09:05

## 2021-11-30 RX ADMIN — DOCUSATE SODIUM 100 MG: 100 CAPSULE, LIQUID FILLED ORAL at 09:05

## 2021-11-30 RX ADMIN — NORGESTIMATE AND ETHINYL ESTRADIOL 1 TABLET: KIT at 09:09

## 2021-11-30 RX ADMIN — OLANZAPINE 10 MG: 5 TABLET, FILM COATED ORAL at 15:21

## 2021-11-30 RX ADMIN — HYDROXYZINE HYDROCHLORIDE 50 MG: 50 TABLET, FILM COATED ORAL at 09:05

## 2021-11-30 RX ADMIN — CHLORHEXIDINE GLUCONATE 15 ML: 1.2 SOLUTION ORAL at 09:05

## 2021-11-30 RX ADMIN — BENZTROPINE MESYLATE 0.5 MG: 0.5 TABLET ORAL at 09:05

## 2021-11-30 RX ADMIN — GABAPENTIN 100 MG: 100 CAPSULE ORAL at 17:15

## 2021-11-30 RX ADMIN — CHLORHEXIDINE GLUCONATE 15 ML: 1.2 SOLUTION ORAL at 19:37

## 2021-11-30 RX ADMIN — OMEPRAZOLE 40 MG: 20 CAPSULE, DELAYED RELEASE ORAL at 09:05

## 2021-11-30 RX ADMIN — Medication 1000 UNITS: at 09:05

## 2021-11-30 RX ADMIN — POLYETHYLENE GLYCOL 3350 17 G: 17 POWDER, FOR SOLUTION ORAL at 09:05

## 2021-11-30 RX ADMIN — BENZTROPINE MESYLATE 0.5 MG: 0.5 TABLET ORAL at 19:37

## 2021-11-30 RX ADMIN — ARIPIPRAZOLE 10 MG: 10 TABLET ORAL at 09:04

## 2021-11-30 ASSESSMENT — ACTIVITIES OF DAILY LIVING (ADL)
HYGIENE/GROOMING: INDEPENDENT
ORAL_HYGIENE: INDEPENDENT
DRESS: INDEPENDENT

## 2021-11-30 NOTE — PROGRESS NOTES
[facility], [place]   Psychiatric Progress Note        Interim History:   The patient's care was discussed with the treatment team during the daily team meeting and/or staff's chart notes were reviewed.  Staff report patient is visible in the milieu but does not attend groups.     Psychiatric symptoms and interventions:   Upon interview with patient tolerated being off 1:1. Patient did some crying but was safe throughout the day. She interacted with some peers. Patient has been cooperative with taking her medications.     Patient talked about being lonely and not having any friends. Patient reported passive suicidal thinking. She did not threaten provider and was calmer during our conversation.     Care Plan: Discussed in treatment team  (11/30)that patient will not remain on 1:1 . Patient engages in SIB while on 1:1 including harm (head banging and biting her hand). therapeutic not for socialization.   1). Discuss coping skills.. Role model coping skills.   2). Encourage socilization to patient.          Medications:       ARIPiprazole  10 mg Oral Daily     [START ON 12/4/2021] ARIPiprazole ER  400 mg Intramuscular Q28 Days     benztropine  0.5 mg Oral BID     chlorhexidine  15 mL Swish & Spit BID     docusate sodium  100 mg Oral BID     hydrOXYzine  50 mg Oral BID     lithium  450 mg Oral At Bedtime     naltrexone  50 mg Oral At Bedtime     norgestimate-ethinyl estradiol  1 tablet Oral Daily     omeprazole  40 mg Oral QAM AC     polyethylene glycol  17 g Oral Daily     venlafaxine  300 mg Oral Daily     Vitamin D3  1,000 Units Oral Daily          Allergies:     Allergies   Allergen Reactions     Penicillins Rash and Unknown          Labs:     Recent Results (from the past 24 hour(s))   UA with Microscopic reflex to Culture    Collection Time: 11/29/21  8:47 AM    Specimen: Urine, Midstream   Result Value Ref Range    Color Urine Yellow Colorless, Straw, Light Yellow, Yellow     "Appearance Urine Clear Clear    Glucose Urine Negative Negative mg/dL    Bilirubin Urine Negative Negative    Ketones Urine Negative Negative mg/dL    Specific Gravity Urine 1.031 1.003 - 1.035    Blood Urine Negative Negative    pH Urine 5.5 5.0 - 7.0    Protein Albumin Urine 10  (A) Negative mg/dL    Urobilinogen Urine Normal Normal, 2.0 mg/dL    Nitrite Urine Negative Negative    Leukocyte Esterase Urine Negative Negative    Bacteria Urine Few (A) None Seen /HPF    Mucus Urine Present (A) None Seen /LPF    RBC Urine 1 <=2 /HPF    WBC Urine 1 <=5 /HPF    Squamous Epithelials Urine 5 (H) <=1 /HPF          Psychiatric Examination:     /86 (BP Location: Right arm)   Pulse 102   Temp 98.4  F (36.9  C) (Oral)   Resp 14   Ht 1.626 m (5' 4\")   Wt 95.1 kg (209 lb 10.5 oz)   SpO2 94%   BMI 35.99 kg/m    Weight is 209 lbs 10.52 oz  Body mass index is 35.99 kg/m .  Orthostatic Vitals  Report      Most Recent      Standing Orthostatic /72 11/29 0900    Standing Orthostatic Pulse (bpm) 95 11/29 0900           Appearance: awake, alert, adequately groomed and dressed in hospital scrubs  Attitude:  evasive and guarded  Eye Contact:  fair  Mood:  angry and anxious  Affect:  intensity is blunted  Speech:  paucity of speech  Psychomotor Behavior:  no evidence of tardive dyskinesia, dystonia, or tics  Throught Process:  illogical, concrete  Associations:  no loose associations  Thought Content:  passive suicidal ideation present and thoughts of self-harm, which are remained the same  Insight:  limited  Judgement:  limited  Oriented to:  time, person, and place  Attention Span and Concentration:  limited  Recent and Remote Memory:  intact  Clinical Global Impressions  First:     Most recent:            Precautions:     Behavioral Orders   Procedures     Assault precautions     Code 1 - Restrict to Unit     Routine Programming     As clinically indicated     Self Injury Precaution     Head banging, biting     Status " 15     Every 15 minutes.     Suicide precautions     Patients on Suicide Precautions should have a Combination Diet ordered that includes a Diet selection(s) AND a Behavioral Tray selection for Safe Tray - with utensils, or Safe Tray - NO utensils            DIagnoses:   1.  Borderline personality disorder.  2.  History of bipolar disorder.  3.  Intellectual disability.         Plan:   Legal status: Voluntary      Medication management:   The patient was not cooperative in discussing medications.  Continuing Abilify; will start oral medication, since Abilify Maintena will not be able to be started  in the hospital.  We will continue Cogentin, Colace, hydroxyzine, lithium, naltrexone, omeprazole, MiraLax, venlafaxine and vitamin D.      Medical: The patient has been reporting abdominal pain.  ED workups have been negative.       REVIEWED ADMISSION LABS.  HCG negative.  COVID screen negative.  Lipid panel:  HDL 44 (low), triglycerides 227 (elevated).  On 11/24/2021 CMP within normal limits, CBC with differential within normal limits.    11/29 UA. Medicine will follow patient regarding complaints of burning pain on urination.      Behavioral/psychology/social:   Encouraged patient to attend therapeutic hospital programming as tolerated      Disposition.   Reason for continued hospitalization: Patient reports she will harm self by hitting head or biting self.   Stabilization with medications, provider structured and supportive environment, groups  Estimated length of stay is 3-5 days   Return to group home. CCM to visit patient on 11/30 at 1:00 pm.

## 2021-11-30 NOTE — PLAN OF CARE
"  Problem: Suicidal Behavior  Goal: Suicidal Behavior is Absent or Managed  Outcome: No Change   /86 (BP Location: Right arm)   Pulse 102   Temp 98.4  F (36.9  C) (Oral)   Resp 14   Ht 1.626 m (5' 4\")   Wt 95.1 kg (209 lb 10.5 oz)   SpO2 94%   BMI 35.99 kg/m  Pt was crying around dinner time and gave Zyprexa 10mg and encouraged pt to eat dinner. Good appetite and poor fluid intake, encouraged fluid pt said I don't like water offered her juice pt refused.  Around 1825 pt was scratching her forearm and crying gave gabapentin 100mg.  Pt endorsed SI with a plan, SIB and AH with command, denied having VH.   "

## 2021-11-30 NOTE — PLAN OF CARE
Kael's Sio was discontinued at at 8:05am. Pt is on an out of bed program and door remains locked during the day. Please be sure to check Kael's lock out times. For the majority of the morning and afternoon kael sat in the lounge withdrawn. She does not communicate with her peers but will talk with staff. She ate her breakfast and lunch. She refuses to go to groups. No scratching or head banging noted this shift. Pt continues to endorse SI, and SIB without a plan. Pt endorses AH. Pt denies VH, and HI. Will continue to monitor.         10:05am- 1st SIO discontinue check was completed. Pt is remaining safe and has not had any behaviors.   12:05pm- 2nd SIO discontinue check was completed. Pt remained safe and no behaviors noted.

## 2021-11-30 NOTE — PROGRESS NOTES
"SPIRITUAL HEALTH SERVICES  SPIRITUAL ASSESSMENT Progress Note  [facility] ([facility]) 4A     REFERRAL SOURCE: Pt request     Sadaf did not want to come to spirituality group, but did ask to speak to me afterwards.  Sadaf talked about the losses she's experienced in her life, including the death of her father.  Pt also talked about her mental health challenges and how she has been in treatment facilities for most of her life.  Sadaf said that her mom is currently not speaking to her, so she does not have significant support.  She does feel connected to God, and described herself as \"[place].\"  She prays and she used to go to [employer] regularly but has not since she moved to a new group home.  We explored what she hopes for her future, and she said she wants to get  and have one child, and that she went to school for a while to be a  and might want to do that.  Sadaf was receptive of the prayer I offered, and I let her know that I would continue to be available for ongoing support.     PLAN: [facility] will remain available     Judy Loyalain  Pager: [phone]    "

## 2021-11-30 NOTE — PLAN OF CARE
Focus: Shift summary    Data: Patient slept 5.75 hours last night. Awake intermittently throughout the night in room during status 15 checks; No interventions needed. SIO within 10 at all times. Respirations even and unlabored on status 15 checks. Will continue to monitor and report to oncoming staff.    Response: Report sleep hours to day shift. Continue to monitor patient and provide therapeutic interventions as necessary.

## 2021-12-01 LAB
C TRACH DNA SPEC QL PROBE+SIG AMP: NEGATIVE
N GONORRHOEA DNA SPEC QL NAA+PROBE: NEGATIVE

## 2021-12-01 PROCEDURE — 99232 SBSQ HOSP IP/OBS MODERATE 35: CPT | Performed by: CLINICAL NURSE SPECIALIST

## 2021-12-01 PROCEDURE — 250N000013 HC RX MED GY IP 250 OP 250 PS 637: Performed by: PSYCHIATRY & NEUROLOGY

## 2021-12-01 PROCEDURE — 250N000013 HC RX MED GY IP 250 OP 250 PS 637: Performed by: CLINICAL NURSE SPECIALIST

## 2021-12-01 PROCEDURE — 124N000002 HC R&B MH UMMC

## 2021-12-01 RX ADMIN — DOCUSATE SODIUM 100 MG: 100 CAPSULE, LIQUID FILLED ORAL at 19:52

## 2021-12-01 RX ADMIN — Medication 1000 UNITS: at 08:28

## 2021-12-01 RX ADMIN — CHLORHEXIDINE GLUCONATE 15 ML: 1.2 SOLUTION ORAL at 19:52

## 2021-12-01 RX ADMIN — OMEPRAZOLE 40 MG: 20 CAPSULE, DELAYED RELEASE ORAL at 07:27

## 2021-12-01 RX ADMIN — HYDROXYZINE HYDROCHLORIDE 50 MG: 50 TABLET, FILM COATED ORAL at 19:51

## 2021-12-01 RX ADMIN — ACETAMINOPHEN 650 MG: 325 TABLET, FILM COATED ORAL at 10:46

## 2021-12-01 RX ADMIN — NORGESTIMATE AND ETHINYL ESTRADIOL 1 TABLET: KIT at 08:40

## 2021-12-01 RX ADMIN — LITHIUM CARBONATE 450 MG: 300 CAPSULE, GELATIN COATED ORAL at 19:53

## 2021-12-01 RX ADMIN — OLANZAPINE 10 MG: 5 TABLET, FILM COATED ORAL at 11:24

## 2021-12-01 RX ADMIN — VENLAFAXINE HYDROCHLORIDE 300 MG: 150 CAPSULE, EXTENDED RELEASE ORAL at 08:28

## 2021-12-01 RX ADMIN — CHLORHEXIDINE GLUCONATE 15 ML: 1.2 SOLUTION ORAL at 08:28

## 2021-12-01 RX ADMIN — HYDROXYZINE HYDROCHLORIDE 50 MG: 50 TABLET, FILM COATED ORAL at 08:29

## 2021-12-01 RX ADMIN — BENZTROPINE MESYLATE 0.5 MG: 0.5 TABLET ORAL at 19:51

## 2021-12-01 RX ADMIN — BENZTROPINE MESYLATE 0.5 MG: 0.5 TABLET ORAL at 08:28

## 2021-12-01 RX ADMIN — POLYETHYLENE GLYCOL 3350 17 G: 17 POWDER, FOR SOLUTION ORAL at 08:28

## 2021-12-01 RX ADMIN — DOCUSATE SODIUM 100 MG: 100 CAPSULE, LIQUID FILLED ORAL at 08:29

## 2021-12-01 RX ADMIN — NALTREXONE HYDROCHLORIDE 50 MG: 50 TABLET, FILM COATED ORAL at 19:53

## 2021-12-01 ASSESSMENT — ACTIVITIES OF DAILY LIVING (ADL)
DRESS: SCRUBS (BEHAVIORAL HEALTH)
LAUNDRY: UNABLE TO COMPLETE
ORAL_HYGIENE: INDEPENDENT
HYGIENE/GROOMING: INDEPENDENT
HYGIENE/GROOMING: INDEPENDENT
DRESS: SCRUBS (BEHAVIORAL HEALTH)
ORAL_HYGIENE: INDEPENDENT

## 2021-12-01 NOTE — PROGRESS NOTES
"[facility], [place]   Psychiatric Progress Note        Interim History:   The patient's care was discussed with the treatment team during the daily team meeting and/or staff's chart notes were reviewed.  Staff report patient is visible in the milieu. She does not attend groups.     Psychiatric symptoms and interventions:   Upon interview with patient she reports that she is bored. She has tolerated being off 1:1 with minimal SIB behaviors. Patient tends to hang around the nursing station. She has poor social skills. She is not in her room much. She has some interaction with her peers. She does not attend groups.     When patient is overwhelmed she will start to cry. She has poor coping skills. Patient would benefits from working with a behaviorist to work on coping skills. Patient endorses chronic passive SI. Somatic complaints have lessened.          Medications:       [START ON 12/4/2021] ARIPiprazole ER  400 mg Intramuscular Q28 Days     benztropine  0.5 mg Oral BID     chlorhexidine  15 mL Swish & Spit BID     docusate sodium  100 mg Oral BID     hydrOXYzine  50 mg Oral BID     lithium  450 mg Oral At Bedtime     naltrexone  50 mg Oral At Bedtime     norgestimate-ethinyl estradiol  1 tablet Oral Daily     omeprazole  40 mg Oral QAM AC     polyethylene glycol  17 g Oral Daily     venlafaxine  300 mg Oral Daily     Vitamin D3  1,000 Units Oral Daily          Allergies:     Allergies   Allergen Reactions     Penicillins Rash and Unknown          Labs:   No results found for this or any previous visit (from the past 24 hour(s)).       Psychiatric Examination:     BP (!) 144/94 (BP Location: Left arm)   Pulse 96   Temp 97.8  F (36.6  C) (Temporal)   Resp 14   Ht 1.626 m (5' 4\")   Wt 95.1 kg (209 lb 10.5 oz)   SpO2 95%   BMI 35.99 kg/m    Weight is 209 lbs 10.52 oz  Body mass index is 35.99 kg/m .  Orthostatic Vitals  Report      Most Recent      Sitting Orthostatic /86 11/30 " 0935    Sitting Orthostatic Pulse (bpm) 88 11/30 0935    Standing Orthostatic /80 11/30 0935    Standing Orthostatic Pulse (bpm) 87 11/30 0935            Appearance: awake, alert, adequately groomed and dressed in hospital scrubs  Attitude:  evasive and guarded  Eye Contact:  fair  Mood:  angry and anxious  Affect:  intensity is blunted  Speech:  paucity of speech  Psychomotor Behavior:  no evidence of tardive dyskinesia, dystonia, or tics  Throught Process:   concrete, borderline IQ  Associations:  no loose associations  Thought Content: chronic  passive suicidal ideation present and thoughts of self-harm, which are remained the same  Insight:  limited  Judgement:  limited  Oriented to:  time, person, and place  Attention Span and Concentration:  limited  Recent and Remote Memory:  intact    Clinical Global Impressions  First:     Most recent:            Precautions:     Behavioral Orders   Procedures     Assault precautions     Code 1 - Restrict to Unit     Routine Programming     As clinically indicated     Self Injury Precaution     Head banging, biting     Status 15     Every 15 minutes.     Suicide precautions     Patients on Suicide Precautions should have a Combination Diet ordered that includes a Diet selection(s) AND a Behavioral Tray selection for Safe Tray - with utensils, or Safe Tray - NO utensils            DIagnoses:   1.  Borderline personality disorder.  2.  History of bipolar disorder.  3.  Intellectual disability.         Plan:     Legal status: Voluntary      Medication management:   The patient was not cooperative in discussing medications.  Continuing Abilify; will start oral medication, since Abilify Maintena will not be able to be started  in the hospital.  We will continue Cogentin, Colace, hydroxyzine, lithium, naltrexone, omeprazole, MiraLax, venlafaxine and vitamin D.      Medical: The patient has been reporting abdominal pain.  ED workups have been negative.       REVIEWED  ADMISSION LABS.  HCG negative.  COVID screen negative.  Lipid panel:  HDL 44 (low), triglycerides 227 (elevated).  On 11/24/2021 CMP within normal limits, CBC with differential within normal limits.     11/29 UA. Medicine will follow patient regarding complaints of burning pain on urination.      Behavioral/psychology/social:   Encouraged patient to attend therapeutic hospital programming as tolerated      Disposition.   Reason for continued hospitalization: Patient reports she will harm self by hitting head or biting self.   Stabilization with medications, provider structured and supportive environment, groups  Tentative plan to discharge back to group home on Thursday 12/2

## 2021-12-01 NOTE — PLAN OF CARE
"Shift update: Sadaf is mentioning often (multiple times) how upset she is about her dad passing away, and how she scratched her right forearm recently. She has multiple random requests, and stands at the desk often. Needs frequent redirection.     Talked about how the provider is \"afraid of me\", and this writer asked if she had reason to be afraid. Sadaf said, \"yes\". But she also wanted ibuprofen ordered for her back pain but the provider won't talk to her. This writer asked pt what she should do to remedy this situation. Pt said she should apologize to the provider, but also isn't ready or wanting to do so. She also agreed that she needs to take responsibility for her scratching and that it's a poor coping strategy but isn't ready for this either.  Accepted the Tylenol for back pain, and continues to walk and listen to music in the halls.    [id number] - Sadaf cried for about 2 hours straight in the lounge. Some staff checked in on her without giving her too much attention and  Moved her when she was no longer being safe (head banging when she was close to the wall). Zyprexa 10mg given. She calmed down by 1315 and walked the halls with headphones again.     Mood/Affect: restricted.     SI: chronic unchanged -contracts for safety   SIB: yes, chronic and unchanged     Hallucinations/Psychosis: denies this morning    Activity/Participation: not interacting with peers, but wants to talk with staff frequently. Redirected.   PRN Meds: Tylenol - back pain  Appetite: adequate         "

## 2021-12-01 NOTE — PLAN OF CARE
Assessment/Intervention/Current Symptoms and Care Coordination    Attended team and reviewed chart.   Called Group home and let them know pt will be discharging tomorrow. Group home asked that pt be there around 3pm so they have enough time to get staff back in the home for pt.   Left pt's Jyoti HOUSTON  letting her know that patient would be discharging tomorrow around 3pm.         Discharge Plan or Goal: Return to group home with current services.           Barriers to Discharge: Group home asked for a 24 hour notice so they could let staff know.         Referral Status: None          Legal Status: Voluntary

## 2021-12-01 NOTE — PLAN OF CARE
Problem: Suicidal Behavior  Goal: Suicidal Behavior is Absent or Managed  Outcome: No Change   Pt was in her room crying and AM nurse gave her Zyprexa 10mg around 1530. Gave her gabapentin 100mg PRN and encouraged pt to socialize with other pts and locked her room for safety reason. Pt was following another pt's behavior. Pt was scratching her R forearm and showing to staff. Pt likes 1:1 staff attention than socializing with her peers.  Good appetite and fluid intake. After another pt coded pt went to her room and did the same as the other pt. She barricaded her self and refused to open the door. I was not able to spent time with her because of the code we had in the unit. Then at bedtime another nurse helped me to open her door. Pt refused bedtime mediations and went to bed.
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
## 2021-12-02 VITALS
OXYGEN SATURATION: 97 % | HEART RATE: 93 BPM | TEMPERATURE: 98.2 F | HEIGHT: 64 IN | WEIGHT: 212.8 LBS | DIASTOLIC BLOOD PRESSURE: 83 MMHG | RESPIRATION RATE: 16 BRPM | BODY MASS INDEX: 36.33 KG/M2 | SYSTOLIC BLOOD PRESSURE: 129 MMHG

## 2021-12-02 PROCEDURE — 250N000013 HC RX MED GY IP 250 OP 250 PS 637: Performed by: CLINICAL NURSE SPECIALIST

## 2021-12-02 PROCEDURE — 99239 HOSP IP/OBS DSCHRG MGMT >30: CPT | Performed by: CLINICAL NURSE SPECIALIST

## 2021-12-02 PROCEDURE — 250N000013 HC RX MED GY IP 250 OP 250 PS 637: Performed by: PSYCHIATRY & NEUROLOGY

## 2021-12-02 RX ADMIN — OLANZAPINE 10 MG: 5 TABLET, FILM COATED ORAL at 12:54

## 2021-12-02 RX ADMIN — POLYETHYLENE GLYCOL 3350 17 G: 17 POWDER, FOR SOLUTION ORAL at 08:39

## 2021-12-02 RX ADMIN — VENLAFAXINE HYDROCHLORIDE 300 MG: 150 CAPSULE, EXTENDED RELEASE ORAL at 08:41

## 2021-12-02 RX ADMIN — OLANZAPINE 10 MG: 5 TABLET, FILM COATED ORAL at 02:42

## 2021-12-02 RX ADMIN — NORGESTIMATE AND ETHINYL ESTRADIOL 1 TABLET: KIT at 08:40

## 2021-12-02 RX ADMIN — OMEPRAZOLE 40 MG: 20 CAPSULE, DELAYED RELEASE ORAL at 08:41

## 2021-12-02 RX ADMIN — CHLORHEXIDINE GLUCONATE 15 ML: 1.2 SOLUTION ORAL at 08:59

## 2021-12-02 RX ADMIN — Medication 1000 UNITS: at 08:41

## 2021-12-02 RX ADMIN — DOCUSATE SODIUM 100 MG: 100 CAPSULE, LIQUID FILLED ORAL at 08:41

## 2021-12-02 RX ADMIN — HYDROXYZINE HYDROCHLORIDE 50 MG: 50 TABLET, FILM COATED ORAL at 08:41

## 2021-12-02 RX ADMIN — ACETAMINOPHEN 650 MG: 325 TABLET, FILM COATED ORAL at 02:14

## 2021-12-02 RX ADMIN — BENZTROPINE MESYLATE 0.5 MG: 0.5 TABLET ORAL at 08:41

## 2021-12-02 ASSESSMENT — ACTIVITIES OF DAILY LIVING (ADL)
ORAL_HYGIENE: INDEPENDENT
DRESS: INDEPENDENT
HYGIENE/GROOMING: INDEPENDENT
LAUNDRY: UNABLE TO COMPLETE

## 2021-12-02 ASSESSMENT — MIFFLIN-ST. JEOR: SCORE: 1710.25

## 2021-12-02 NOTE — PLAN OF CARE
Problem: Suicide Risk  Goal: Absence of Self-Harm  Outcome: No Change    Patient crying the first part of the evening shift and eventually able to self soothe and calm; appetite good, med compliant and went to bed by 8 pm; no dysregulation, aggression toward self or others.    NURSING ASSESSMENT  SI/SIB: passive SI with SIB urges  A/V HA: denies  HI/Aggression: none this shift  Milieu participation: visible in milieu until 7 pm  PRN Medications: None given this shift  Medication AE: pt denies  Physical complaints/medical concerns: pt denies current discomfort, questions or concerns  Appetite: good  ADLs: WDL  Status:15 minute checks  LBM: 12/1/21  Vital signs: WNL

## 2021-12-02 NOTE — PLAN OF CARE
"  Problem: Suicide Risk  Goal: Absence of Self-Harm  Outcome: Improving    D) Patient visible and active in milieu; observed to be, socializing, affect full range, asks to shower and follows through with this independently, using music headphones and walking sylvester, med compliant  and agrees that she is feeling better today and denies any physical pain, VSS, appetite good and sleeps 3.5 hours from midnight to early morning; patient is going to bed early by 8 pm and equals over 7 hours per night average.    A) Review of safety plan, coping worksheets and assist to complete; patient able to read very well, identifies triggers, support persons and to post her plan on mirror of her room.    R) Patient has not been engaging in SIB and demonstrates coping skills and even will sit on yoga faiza and cry with self soothing tether in response to feeling triggered- patient did report feeling heightened anxiety along [place] voices at [id number] and given prn Zyprexa for agitation; after 20 minutes, patient able to sit and calmly review her plan; After review of plan, patient agrees that she is able to use skills and inform staff when in distress. Patient misses playing basketball, football and go for walks outside at home; Patient agrees, \"I am no longer in Crisis mode.\" Patient continues to report voices at night when alone and chronic SI/SIB thoughts, but not present at the current time.    Patient has episode of crying, mood less labile as easily diverted to walking sylvester and socializing 1:1 with others.  "
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
## 2021-12-02 NOTE — PROGRESS NOTES
"Patient discharging 12/2/2021 and walks off unit at 1500 with all personal belongings, accompanied by staff, security and destination is to Group Home.    Discharge paperwork and medications reviewed with patient who verbalizes understanding and still feels upset to leave. \"I don't like the group home,\" Patient informed to discuss her options with her CCM.     Copies provided: AVS -yes        Illness Management Recovery model: Personal Plan of Care    Patient completed Personal Plan of Care, identifying reasons for hospitalization and goals for discharge. Form reviewed in team meeting  by patient, physician, writer and RN. Form given to [facility] to be scanned into EPIC.    Survey provided.  "

## 2021-12-02 NOTE — DISCHARGE SUMMARY
"Psychiatric Discharge Summary    Sadaf Ross MRN# [id number]   Age: 21 year old YOB: 2000     Date of Admission:  2021  Date of Discharge:  2021  Admitting Physician:  Johnson Orozco MD  Discharge Physician:  Debra A. Naegele, APRN CNS (Contact: [phone])         Event Leading to Hospitalization:   Sadaf Ross is a 21-year-old single  female presenting with a history of borderline personality disorder, bipolar disorder, developmental delay and presenting with suicidal ideation.  The patient reports increasing thoughts of suicidal thinking since her father  last February.  The patient stated that she has pain all over his body since February, when her father .  The patient was unable to describe her pain or where her pain was located.  The patient states \"I am going to bite myself to death.\"  The patient reports \"I will never get better.\"  The patient has reported diffuse abdominal pain, has been in and out of multiple EDs for abdominal pain.  The patient lives in a group home and has been calling to get into multiple EDs.  The patient was assessed at [facility] ED and was discharged.  The patient came to [place] ED the same day for abdominal workup.  The patient's abdominal workup was negative.  The patient reports that she is lonely.  The patient states everyone was discharged from her group home.  The patient states that she does not have any friends.  The patient states the last time she talked with a friend was in February, when her father .  Goal for this hospitalization is stabilization.       See Admission note by Naegele, Debra Ann, APRN CNS on 2021  for additional details.          DIagnoses:     1.  Borderline personality disorder.  2.  History of bipolar disorder.  3.  Intellectual disability.            Labs:     Results for orders placed or performed during the hospital encounter of 21   HCG qualitative urine (UPT)     " Status: Normal   Result Value Ref Range    hCG Urine Qualitative Negative Negative   Asymptomatic COVID-19 Virus (Coronavirus) by PCR Oropharynx     Status: Normal    Specimen: Oropharynx; Swab   Result Value Ref Range    SARS CoV2 PCR Negative Negative    Narrative    Testing was performed using the nestor  SARS-CoV-2 & Influenza A/B Assay on the nestor  Tamar  System.  This test should be ordered for the detection of SARS-COV-2 in individuals who meet SARS-CoV-2 clinical and/or epidemiological criteria. Test performance is unknown in asymptomatic patients.  This test is for in vitro diagnostic use under the FDA EUA for laboratories certified under CLIA to perform moderate and/or high complexity testing. This test has not been FDA cleared or approved.  A negative test does not rule out the presence of PCR inhibitors in the specimen or target RNA in concentration below the limit of detection for the assay. The possibility of a false negative should be considered if the patient's recent exposure or clinical presentation suggests COVID-19.  [facility] are certified under the Clinical Laboratory Improvement Amendments of 1988 (CLIA-88) as qualified to perform moderate and/or high complexity laboratory testing.   Drug abuse screen 1 urine (ED)     Status: Normal   Result Value Ref Range    Amphetamines Urine Screen Negative Screen Negative    Barbiturates Urine Screen Negative Screen Negative    Benzodiazepines Urine Screen Negative Screen Negative    Cannabinoids Urine Screen Negative Screen Negative    Cocaine Urine Screen Negative Screen Negative    Opiates Urine Screen Negative Screen Negative   Lipid panel     Status: Abnormal   Result Value Ref Range    Cholesterol 171 <200 mg/dL    Triglycerides 227 (H) <150 mg/dL    Direct Measure HDL 44 (L) >=50 mg/dL    LDL Cholesterol Calculated 82 <=100 mg/dL    Non HDL Cholesterol 127 <130 mg/dL    Narrative    Cholesterol  Desirable:  <200  mg/dL    Triglycerides  Normal:  Less than 150 mg/dL  Borderline High:  150-199 mg/dL  High:  200-499 mg/dL  Very High:  Greater than or equal to 500 mg/dL    Direct Measure HDL  Female:  Greater than or equal to 50 mg/dL   Male:  Greater than or equal to 40 mg/dL    LDL Cholesterol  Desirable:  <100mg/dL  Above Desirable:  100-129 mg/dL   Borderline High:  130-159 mg/dL   High:  160-189 mg/dL   Very High:  >= 190 mg/dL    Non HDL Cholesterol  Desirable:  130 mg/dL  Above Desirable:  130-159 mg/dL  Borderline High:  160-189 mg/dL  High:  190-219 mg/dL  Very High:  Greater than or equal to 220 mg/dL   UA with Microscopic reflex to Culture     Status: Abnormal    Specimen: Urine, Midstream   Result Value Ref Range    Color Urine Yellow Colorless, Straw, Light Yellow, Yellow    Appearance Urine Clear Clear    Glucose Urine Negative Negative mg/dL    Bilirubin Urine Negative Negative    Ketones Urine Negative Negative mg/dL    Specific Gravity Urine 1.031 1.003 - 1.035    Blood Urine Negative Negative    pH Urine 5.5 5.0 - 7.0    Protein Albumin Urine 10  (A) Negative mg/dL    Urobilinogen Urine Normal Normal, 2.0 mg/dL    Nitrite Urine Negative Negative    Leukocyte Esterase Urine Negative Negative    Bacteria Urine Few (A) None Seen /HPF    Mucus Urine Present (A) None Seen /LPF    RBC Urine 1 <=2 /HPF    WBC Urine 1 <=5 /HPF    Squamous Epithelials Urine 5 (H) <=1 /HPF    Narrative    Urine Culture not indicated   Internal Medicine Adult IP Consult for [facility] Young Adult on 4A: Patient to be seen: Routine within 24 hrs; Call back #: [phone]; Pt complaining of pain and burning on urination. Pls review UA results and advise. Pt unreliable .; Consultan...     Status: None ()    Narrative    Meche Galicia CNP     11/29/2021  4:04 PM    Internal Medicine Consult - Initial Visit       Sadaf Ross MRN# [id number]   YOB: 2000 Age: 21 year old   Date of Admission: 11/25/2021  PCP:  [facility]  Date of Service: 11/29/2021    Referring Provider: Johnson Orozco MD  Reason for Consult: Dysuria          Assessment and Recommendations:   Sadaf Ross is a 21 year old female with a history of   bipolar 1 disorder, borderline personality disorder, depression,   intellectual disability, and chronic suicidal ideations admitted   to station 4A for SI and self injurious behavior urges.      # SI, SIB urges  # Bipolar 1 disorder  # Borderline personality disorder   # Depression   Per chart review, admitted to [facility] in 7/2021 w/ similar   symptoms.    - Management per Psychiatry     # Dysuria - Pt reports dysuria, reportedly ongoing since 7/2021.   Was told this summer her sx were confirmed to be from a UTI, was   prescribed abx but somewhat unclear why she did not take these.   Reports hematuria 2 days ago, but not c/w UA results.  UA from   11/29/2021 reviewed, does not appear c/w UTI.  No UC collected.   Urine hCG negative on 11/25/21, so pregnancy less likely.  - Check wet prep   - Check urine G/C   - Ensure personal hygiene routines   - Encourage adequate hydration   - Monitor for new/recurrent symptoms    Medicine will continue to follow along peripherally for results   of wet prep and urine G/C.  Recommendations relayed to primary   team via this progress note.  Thank you for the opportunity to be   involved in this patient's care.    Meche Galicia, CNP, APRN  Internal Medicine [facility] Hospitalist  [facility]  Pager [phone]           History of Present Illness:   History is obtained from the patient and medical record.     This patient is a 21 year old female with a history of bipolar 1   disorder, borderline personality disorder, depression,   intellectual disability, and chronic suicidal ideations admitted   to station 4A for SI and self injurious behavior urges.      Internal Medicine service was asked to see patient for complaints   of dysuria.  Sadaf  "notes that her symptoms began in July 2021.   She states that at that time she was diagnosed with a UTI and   prescribed antibiotics, but unclear why she never took them.   Then, two days ago, she noticed that her urine was \"pinkish\"   which was new for her. She is concerned that this is blood in her   urine.  She does note that her symptoms have been off and on   since her father passed away earlier this year, and reports   having a lot of anxiety around this as well.      As far as her LMP goes, reports that her cycles are generally   consistent and characterized by light spotting. However, her most   recent LMP (mid-October, ~6 weeks ago) was \"really heavy\" which   was unusual for her. This stopped as her periods usually do. Does   not elaborate further on details. Most recent sexual contacts   were after her LMP, at which time she was engaging with males and   females. With males, was using condoms. She also takes daily oral   contraception. Denies the chance of pregnancy. Further denies   vaginal discharge, ongoing vaginal bleeding, fevers, new chills   (reports this is a chronic issue), new abdominal pain (chronic   issue for her).  Notes some constipation today.            Review of Systems:   A 10 point ROS was performed and negative unless otherwise noted   in HPI.           Past Medical History:   Reviewed and updated in Epic.  Past Medical History:   Diagnosis Date     ADHD (attention deficit hyperactivity disorder)      Bipolar 1 disorder (H)      Borderline personality disorder (H)      Depression      Depressive disorder      Intellectual disability      Obesity      Syncope              Past Surgical History:   Reviewed and updated in Epic.  Past Surgical History:   Procedure Laterality Date     APPENDECTOMY       APPENDECTOMY               Social History:   Reviewed and updated in [employer].  Social History     Socioeconomic History     Marital status: Single     Spouse name: Not on file     Number of " children: Not on file     Years of education: Not on file     Highest education level: Not on file   Occupational History     Not on file   Tobacco Use     Smoking status: Current Some Day Smoker     Years: 5.00     Types: Cigarettes     Smokeless tobacco: Never Used   Substance and Sexual Activity     Alcohol use: No     Drug use: No     Sexual activity: Yes     Partners: Female, Male     Birth control/protection: Pill   Other Topics Concern     Not on file   Social History Narrative     Not on file     Social Determinants of Health     Financial Resource Strain: Not on file   Food Insecurity: Not on file   Transportation Needs: Not on file   Physical Activity: Not on file   Stress: Not on file   Social Connections: Not on file   Intimate Partner Violence: Not on file   Housing Stability: Not on file              Family History:   Reviewed and updated in Epic.  Family History   Problem Relation Age of Onset     Diabetes Type 1 Father      Cancer Paternal Grandfather              Allergies:     Allergies   Allergen Reactions     Penicillins Rash and Unknown             Medications:     Current Facility-Administered Medications   Medication     acetaminophen (TYLENOL) tablet 650 mg     alum & mag hydroxide-simethicone (MAALOX) suspension 30 mL     ARIPiprazole (ABILIFY) tablet 10 mg     [START ON 12/4/2021] ARIPiprazole ER (ABILIFY MAINTENA)   extended release inj syringe 400 mg     benztropine (COGENTIN) tablet 0.5 mg     calcium carbonate (TUMS) chewable tablet 500 mg     chlorhexidine (PERIDEX) 0.12 % solution 15 mL     docusate sodium (COLACE) capsule 100 mg     gabapentin (NEURONTIN) capsule 100 mg     hydrOXYzine (ATARAX) tablet 50 mg     lithium (ESKALITH) capsule 450 mg     metoclopramide (REGLAN) tablet 10 mg     naltrexone (DEPADE/REVIA) tablet 50 mg     norgestimate-ethinyl estradiol (ORTHO-CYCLEN) 0.25-35 MG-MCG   per tablet 1 tablet     OLANZapine (zyPREXA) tablet 5-10 mg    Or     OLANZapine (zyPREXA)  "injection 10 mg     omeprazole (priLOSEC) CR capsule 40 mg     ondansetron (ZOFRAN) tablet 4 mg     polyethylene glycol (MIRALAX) Packet 17 g     traZODone (DESYREL) tablet 50 mg     venlafaxine (EFFEXOR-XR) 24 hr capsule 300 mg     Vitamin D3 (CHOLECALCIFEROL) tablet 1,000 Units            Physical Exam:   Blood pressure 123/80, pulse 97, temperature 97.9  F (36.6  C),   temperature source Oral, resp. rate 14, height 1.626 m (5' 4\"),   weight 95.1 kg (209 lb 10.5 oz), SpO2 98 %, not currently   breastfeeding.  Body mass index is 35.99 kg/m .    GENERAL: Alert and oriented x 3. Well nourished, well developed.    No acute distress.    HEENT: Normocephalic, atraumatic. Anicteric sclera. Mucous   membranes moist.   CV: RRR. S1, S2. No murmurs appreciated.   RESPIRATORY: Effort normal on room air. Lungs CTAB with no   wheezing, rales, or rhonchi.   GI: Abdomen soft and non distended, bowel sounds present x all 4   quadrants. No masses, rebound or guarding. Mild tenderness to   palpation throughout abdomen, more pronounced at suprapubic   region.  NEUROLOGICAL: No focal deficits. Follows commands.    MUSCULOSKELETAL: No joint swelling. Moves all extremities.   EXTREMITIES: No gross deformities. No peripheral edema. Intact   bilateral pedal pulses.   SKIN: Grossly warm, dry, and intact. No jaundice. No rashes.             Data:   I personally reviewed the following studies:    ROUTINE IP LABS (Last four results)  CMP Recent Labs   Lab 11/24/21 1902      POTASSIUM 4.2   CHLORIDE 109   CO2 28   ANIONGAP 3   GLC 95   BUN 11   CR 0.71   SAMIR 9.6   PROTTOTAL 7.8   ALBUMIN 4.0   BILITOTAL 0.3   ALKPHOS 50   AST 18   ALT 29     CBC   Recent Labs   Lab 11/24/21  1902   WBC 9.1   RBC 4.26   HGB 12.0   HCT 36.2   MCV 85   MCH 28.2   MCHC 33.1   RDW 12.5        INR No lab results found in last 7 days.        Unresulted Labs Ordered in the Past 30 Days of this Admission     No orders found from 10/26/2021 to 11/26/2021. "              Alcohol breath test POCT     Status: Normal   Result Value Ref Range    Alcohol Breath Test 0.00 0.00 - 0.01   Chlamydia trachomatis/Neisseria gonorrhoeae by PCR     Status: Normal    Specimen: Urine, Voided   Result Value Ref Range    Chlamydia Trachomatis Negative Negative    Neisseria gonorrhoeae Negative Negative   Urine Drugs of Abuse Screen     Status: Normal    Narrative    The following orders were created for panel order Urine Drugs of Abuse Screen.  Procedure                               Abnormality         Status                     ---------                               -----------         ------                     Drug abuse screen 1 urin...[[id number]]  Normal              Final result                 Please view results for these tests on the individual orders.            Consults:   Internal medicine consult for possible UTI and chest pain.     Meche Galicia CNP   Nurse Practitioner   Medicine   Consults       Signed   Date of Service:  11/29/2021  1:45 PM   Creation Time:  11/29/2021  1:45 PM           Consult Orders   Internal Medicine Adult IP Consult for [facility] Young Adult on 4A: Patient to be seen: Routine within 24 hrs; Call back #: [phone]; Pt complaining of pain and burning on urination. Pls review UA results and advise. Pt unreliable .; Consultan... [[id number]] ordered by Naegele, Debra Ann, APRN CNS at 11/29/21 1335          Expand All Collapse All          []Hide copied text    []Selene for details      Internal Medicine Consult - Initial Visit        Sadaf Ross MRN# [id number]   YOB: 2000 Age: 21 year old   Date of Admission: 11/25/2021  PCP: [facility]  Date of Service: 11/29/2021     Referring Provider: Johnson Orozco MD  Reason for Consult: Dysuria           Assessment and Recommendations:   Sadaf Ross is a 21 year old female with a history of bipolar 1 disorder, borderline personality disorder, depression,  "intellectual disability, and chronic suicidal ideations admitted to station 4A for SI and self injurious behavior urges.       # SI, SIB urges  # Bipolar 1 disorder  # Borderline personality disorder   # Depression   Per chart review, admitted to [facility] in 7/2021 w/ similar symptoms.    - Management per Psychiatry      # Dysuria - Pt reports dysuria, reportedly ongoing since 7/2021. Was told this summer her sx were confirmed to be from a UTI, was prescribed abx but somewhat unclear why she did not take these. Reports hematuria 2 days ago, but not c/w UA results.  UA from 11/29/2021 reviewed, does not appear c/w UTI.  No UC collected. Urine hCG negative on 11/25/21, so pregnancy less likely.  - Check wet prep   - Check urine G/C   - Ensure personal hygiene routines   - Encourage adequate hydration   - Monitor for new/recurrent symptoms     Medicine will continue to follow along peripherally for results of wet prep and urine G/C.  Recommendations relayed to primary team via this progress note.  Thank you for the opportunity to be involved in this patient's care.     Meche Galicia, CNP, APRN  Internal Medicine [facility] Hospitalist  [facility]  Pager [phone]             History of Present Illness:   History is obtained from the patient and medical record.      This patient is a 21 year old female with a history of bipolar 1 disorder, borderline personality disorder, depression, intellectual disability, and chronic suicidal ideations admitted to station 4A for SI and self injurious behavior urges.       Internal Medicine service was asked to see patient for complaints of dysuria.  Sadaf notes that her symptoms began in July 2021. She states that at that time she was diagnosed with a UTI and prescribed antibiotics, but unclear why she never took them. Then, two days ago, she noticed that her urine was \"pinkish\" which was new for her. She is concerned that this is blood in her urine.  She does " "note that her symptoms have been off and on since her father passed away earlier this year, and reports having a lot of anxiety around this as well.       As far as her LMP goes, reports that her cycles are generally consistent and characterized by light spotting. However, her most recent LMP (mid-October, ~6 weeks ago) was \"really heavy\" which was unusual for her. This stopped as her periods usually do. Does not elaborate further on details. Most recent sexual contacts were after her LMP, at which time she was engaging with males and females. With males, was using condoms. She also takes daily oral contraception. Denies the chance of pregnancy. Further denies vaginal discharge, ongoing vaginal bleeding, fevers, new chills (reports this is a chronic issue), new abdominal pain (chronic issue for her).  Notes some constipation today.             Review of Systems:   A 10 point ROS was performed and negative unless otherwise noted in HPI.            Past Medical History:   Reviewed and updated in Epic.  Past Medical History        Past Medical History:   Diagnosis Date     ADHD (attention deficit hyperactivity disorder)       Bipolar 1 disorder (H)       Borderline personality disorder (H)       Depression       Depressive disorder       Intellectual disability       Obesity       Syncope                    Past Surgical History:   Reviewed and updated in Epic.  Past Surgical History         Past Surgical History:   Procedure Laterality Date     APPENDECTOMY         APPENDECTOMY                      Social History:   Reviewed and updated in [employer].  Social History   Social History            Socioeconomic History     Marital status: Single       Spouse name: Not on file     Number of children: Not on file     Years of education: Not on file     Highest education level: Not on file   Occupational History     Not on file   Tobacco Use     Smoking status: Current Some Day Smoker       Years: 5.00       Types: Cigarettes     " Smokeless tobacco: Never Used   Substance and Sexual Activity     Alcohol use: No     Drug use: No     Sexual activity: Yes       Partners: Female, Male       Birth control/protection: Pill   Other Topics Concern     Not on file   Social History Narrative     Not on file      Social Determinants of Health      Financial Resource Strain: Not on file   Food Insecurity: Not on file   Transportation Needs: Not on file   Physical Activity: Not on file   Stress: Not on file   Social Connections: Not on file   Intimate Partner Violence: Not on file   Housing Stability: Not on file                  Family History:   Reviewed and updated in Epic.  Family History         Family History   Problem Relation Age of Onset     Diabetes Type 1 Father       Cancer Paternal Grandfather                    Allergies:           Allergies   Allergen Reactions     Penicillins Rash and Unknown               Medications:          Current Facility-Administered Medications   Medication     acetaminophen (TYLENOL) tablet 650 mg     alum & mag hydroxide-simethicone (MAALOX) suspension 30 mL     ARIPiprazole (ABILIFY) tablet 10 mg     [START ON 12/4/2021] ARIPiprazole ER (ABILIFY MAINTENA) extended release inj syringe 400 mg     benztropine (COGENTIN) tablet 0.5 mg     calcium carbonate (TUMS) chewable tablet 500 mg     chlorhexidine (PERIDEX) 0.12 % solution 15 mL     docusate sodium (COLACE) capsule 100 mg     gabapentin (NEURONTIN) capsule 100 mg     hydrOXYzine (ATARAX) tablet 50 mg     lithium (ESKALITH) capsule 450 mg     metoclopramide (REGLAN) tablet 10 mg     naltrexone (DEPADE/REVIA) tablet 50 mg     norgestimate-ethinyl estradiol (ORTHO-CYCLEN) 0.25-35 MG-MCG per tablet 1 tablet     OLANZapine (zyPREXA) tablet 5-10 mg     Or     OLANZapine (zyPREXA) injection 10 mg     omeprazole (priLOSEC) CR capsule 40 mg     ondansetron (ZOFRAN) tablet 4 mg     polyethylene glycol (MIRALAX) Packet 17 g     traZODone (DESYREL) tablet 50 mg      "venlafaxine (EFFEXOR-XR) 24 hr capsule 300 mg     Vitamin D3 (CHOLECALCIFEROL) tablet 1,000 Units              Physical Exam:   Blood pressure 123/80, pulse 97, temperature 97.9  F (36.6  C), temperature source Oral, resp. rate 14, height 1.626 m (5' 4\"), weight 95.1 kg (209 lb 10.5 oz), SpO2 98 %, not currently breastfeeding.  Body mass index is 35.99 kg/m .     GENERAL: Alert and oriented x 3. Well nourished, well developed.  No acute distress.    HEENT: Normocephalic, atraumatic. Anicteric sclera. Mucous membranes moist.   CV: RRR. S1, S2. No murmurs appreciated.   RESPIRATORY: Effort normal on room air. Lungs CTAB with no wheezing, rales, or rhonchi.   GI: Abdomen soft and non distended, bowel sounds present x all 4 quadrants. No masses, rebound or guarding. Mild tenderness to palpation throughout abdomen, more pronounced at suprapubic region.  NEUROLOGICAL: No focal deficits. Follows commands.    MUSCULOSKELETAL: No joint swelling. Moves all extremities.   EXTREMITIES: No gross deformities. No peripheral edema. Intact bilateral pedal pulses.   SKIN: Grossly warm, dry, and intact. No jaundice. No rashes.               Data:   I personally reviewed the following studies:     ROUTINE IP LABS (Last four results)      CMP Recent Labs   Lab 11/24/21  1902      POTASSIUM 4.2   CHLORIDE 109   CO2 28   ANIONGAP 3   GLC 95   BUN 11   CR 0.71   SAMIR 9.6   PROTTOTAL 7.8   ALBUMIN 4.0   BILITOTAL 0.3   ALKPHOS 50   AST 18   ALT 29      CBC       Recent Labs   Lab 11/24/21  1902   WBC 9.1   RBC 4.26   HGB 12.0   HCT 36.2   MCV 85   MCH 28.2   MCHC 33.1   RDW 12.5         INR No lab results found in last 7 days.               Unresulted Labs Ordered in the Past 30 Days of this Admission      No orders found from 10/26/2021 to 11/26/2021.                           Carlos Reed MD   Physician   Medicine   Progress Notes       Addendum   Date of Service:  11/26/2021  7:00 PM   Creation Time:  11/26/2021  7:28 PM    " "          Addendum              []Hide copied text    []Hover for details    I was asked by RN to evaluate Sadaf Ross who is complaining of chest pain.     Sadaf says she is sad about her father who passed away in February 2021.     She is tearful and very emotional.     She endorses diffuse substernal chest pain, nonradiating.  She has nausea but no emesis.  No associated diaphoresis or shortness of breath.  Chest pain reproducible and gets worse with deep inspiration.  She has had similar CP in the past when she was sad.     No CAD risk factors except for smoking history.     /79 (BP Location: Left arm, Patient Position: Sitting)   Pulse 107   Temp 98  F (36.7  C) (Oral)   Resp 14   Ht 1.626 m (5' 4\")   Wt 95.6 kg (210 lb 12.8 oz)   SpO2 100%   BMI 36.18 kg/m    General:   Alert and oriented x3, tearful, no apparent distress  Cardiovascular:   Normal S1-S2, no murmurs rubs or gallops  Lungs:   Clear to auscultation bilateral     Assessment/plan:   Patient's chest pain is noncardiac in etiology.  Continue current care. She may benefit anxiolytic med.          Carlos Reed MD.   Hospitalist.                         Hospital Course:   Sadaf Ross was admitted to [facility] with attending Johnson Orozco MD as a voluntary patient. The patient was placed under status 15 (15 minute checks) to ensure patient safety.     Patient was started on her PTA medications. Patient was briefly put on 1:1 due to her reporting she would harm herself.  Patient was taken off 1:1 and she did not harm self. She engaged in minimal scratching. She did not hit her head on the wall. No changes were made to her medications. Patient needs more socialization and behavior modification. She has poor coping skills. When she is frustrated she will cry. She depended on staff for co-regulation.     Patient would benefit from working with a behaviorist on behavior modification program .      Patient made less somatic " complaints. Please review consult notes form internal medicine.       Sadaf Ross did not participate in groups and was visible in the milieu.     The patient's symptoms of suicidal ideation, SIB urges/ beahvioor improved. Patient demonstrated improved mood. When patient is frustrated she will make a passive suicidal comment with support from staff patient did not engage in self harm. Patient has protective factors of seeking out treatment and support from her group home.     Sadaf Ross was released to group home. At the time of discharge Sadaf Ross was determined to not be a danger to herself or others.          Discharge Medications:     Current Discharge Medication List      CONTINUE these medications which have NOT CHANGED    Details   acetaminophen (TYLENOL) 650 MG CR tablet Take 650 mg by mouth every 6 hours as needed for mild pain or fever       alum & mag hydroxide-simethicone (MAALOX) 200-200-20 MG/5ML SUSP suspension Take 15 mLs by mouth every 6 hours as needed for indigestion       ARIPiprazole ER (ABILIFY MAINTENA) 400 MG extended release inj syringe Inject 400 mg into the muscle every 28 days On the 4th of each month      benztropine (COGENTIN) 0.5 MG tablet Take 0.5 mg by mouth 2 times daily      calcium carbonate (TUMS) 500 MG chewable tablet Take 1 chew tab by mouth 2 times daily as needed for heartburn       chlorhexidine (CHLORHEXIDINE) 0.12 % solution Swish and spit 15 mLs in mouth 2 times daily       docusate sodium (COLACE) 100 MG capsule Take 100 mg by mouth 2 times daily       hydrOXYzine (ATARAX) 50 MG tablet Take 50 mg by mouth 2 times daily       ibuprofen (ADVIL/MOTRIN) 400 MG tablet Take 400 mg by mouth 3 times daily as needed for moderate pain      lithium (ESKALITH) 150 MG capsule Take 3 capsules (450 mg) by mouth At Bedtime  Qty: 90 capsule, Refills: 0    Associated Diagnoses: Bipolar affective disorder, currently depressed, moderate (H); Borderline personality  disorder (H)      metoclopramide (REGLAN) 10 MG tablet Take 10 mg by mouth every 6 hours as needed      naltrexone (DEPADE/REVIA) 50 MG tablet Take 50 mg by mouth At Bedtime      norgestimate-ethinyl estradiol (SPRINTEC 28) 0.25-35 MG-MCG tablet Take 1 tablet by mouth daily      omeprazole (PRILOSEC) 40 MG DR capsule Take 40 mg by mouth daily before breakfast      ondansetron (ZOFRAN) 4 MG tablet Take 4 mg by mouth every 12 hours as needed for nausea Give 1 tablet under tongue up to 2 times per day      polyethylene glycol (MIRALAX) 17 g packet Take 1 packet by mouth daily      venlafaxine (EFFEXOR-XR) 150 MG 24 hr capsule Take 2 capsules (300 mg) by mouth daily  Qty: 60 capsule, Refills: 1    Associated Diagnoses: Borderline personality disorder (H); Major depressive disorder, recurrent episode, in partial remission (H)      Vitamin D, Cholecalciferol, 25 MCG (1000 UT) TABS Take 1,000 Units by mouth daily                   Psychiatric Examination:   Appearance:  awake, alert and adequately groomed  Attitude:  cooperative  Eye Contact:  good  Mood:  better  Affect:  intensity is blunted  Speech:  normal prosody  Psychomotor Behavior:  no evidence of tardive dyskinesia, dystonia, or tics  Thought Process:  goal oriented, concrete thinking, intellectual disability  Associations:  no loose associations  Thought Content:  passive suicidal ideation present(chronic)  Insight:  limited  Judgment:  poor  Oriented to:  time, person, and place  Attention Span and Concentration:  fair  Recent and Remote Memory:  intact  Language: Able to name objects and Able to repeat phrases  Fund of Knowledge: delayed  Muscle Strength and Tone: normal  Gait and Station: Normal         Discharge Plan:       Further instructions from your care team       Behavioral Discharge Planning and Instructions    Summary: You were admitted on 11/25/2021  due to Suicidal Ideations.  You were treated by Debra Naegele, APRN and discharged on 12/02/2021  from 4[facility] to Group Home: [facility]: [address] [place], [place]  Supervisor: Arlene [phone]    Main Diagnosis:   1.  Borderline personality disorder.  2.  History of bipolar disorder.  3.  Intellectual disability.    Health Care Follow-up:   Medication Management:   Appointment Date/Time: Friday, December 10th @ 9:00am (IN-PERSON)  Psychiatric Provider: Emma Jacobson (New provider for patient)  [facility]      Phone Number: [phone]  Fax Number: [phone]    Individual Therapy:   Appointment Date/Time: Wednesday, December 8th @ 10:00am (IN-PERSON)   Therapist: Laina     Address: [facility]     Phone Number:  [phone]    Abilify Maintena 400 mg Injection Appointment: 12/4/2021 (Per records)    Physical Therapy Appointment:   Date/Time: 12/3/2021 9:40 AM Provider: Farida Acosta PT   Location: [facility]  PT     : Jyoti Malik @ [facility] [phone]    CADI : Judy @ [facility] [phone]    Skills Worker: Carlos @ [facility] [address] KUNAL marley [address] [place] [id number] [phone]      Attend all scheduled appointments with your outpatient providers. Call at least 24 hours in advance if you need to reschedule an appointment to ensure continued access to your outpatient providers.     Major Treatments, Procedures and Findings:  You were provided with: a psychiatric assessment, assessed for medical stability, medication evaluation and/or management and group therapy    Symptoms to Report: feeling more aggressive, increased confusion, losing more sleep, mood getting worse or thoughts of suicide    Early warning signs can include: increased depression or anxiety sleep disturbances increased thoughts or behaviors of suicide or self-harm  increased unusual thinking, such as paranoia or hearing voices    Safety and Wellness:  Take all medicines as directed.  Make no changes  "unless your doctor suggests them.      Follow treatment recommendations.  Refrain from alcohol and non-prescribed drugs.  Ask your support system to help you reduce your access to items that could harm yourself or others. If there is a concern for safety, call 911.    Resources:   Crisis Intervention: 739.915.6822 or 655-478-1038 (TTY: 882.860.7634).  Call anytime for help.  National [place] on [place]ness (www.[place].celine.org): 896.937.8869 or 221-335-0185.  National Suicide Prevention Line (www.mentalhealthmn.org): 570-486-ZMXH (9875)  [facility] Crisis (COPE) Response - Adult [phone]  Text 4 Life: txt \"LIFE\" to 57217 for immediate support and crisis intervention  Crisis text line: Text \"[place]\" to 578545. Free, confidential, 24/7.    General Medication Instructions:   See your medication sheet(s) for instructions.   Take all medicines as directed.  Make no changes unless your doctor suggests them.   Go to all your doctor visits.  Be sure to have all your required lab tests. This way, your medicines can be refilled on time.  Do not use any drugs not prescribed by your doctor.  Avoid alcohol.    Advance Directives:   Scanned document on file with Handpressions? No scanned doc  Is document scanned? Pt states no documents  Honoring Choices Your Rights Handout: Informed and given  Was more information offered? Materials given    The Treatment team has appreciated the opportunity to work with you. If you have any questions or concerns about your recent admission, you can contact the unit which can receive your call 24 hours a day, 7 days a week. They will be able to get in touch with a Provider if needed. The unit number is [phone] .      Attestation:  The patient has been seen and evaluated by me,  Debra A. Naegele, APRN CNS 1202/2021  Discharge summary time > 30 minutes   "

## 2021-12-03 NOTE — DISCHARGE INSTRUCTIONS
Behavioral Discharge Planning and Instructions    Summary: You were admitted on 11/25/2021  due to Suicidal Ideations.  You were treated by Debra Naegele, APRN and discharged on 12/02/2021 from 4A to Group Home: [facility]: [address] [place], [place]  Supervisor: Arlene [phone]    Main Diagnosis:   1.  Borderline personality disorder.  2.  History of bipolar disorder.  3.  Intellectual disability.    Health Care Follow-up:   Medication Management:   Appointment Date/Time: Friday, December 10th @ 9:00am (IN-PERSON)  Psychiatric Provider: Emma Jacobson (New provider for patient)  [facility]      Phone Number: [phone]  Fax Number: [phone]    Individual Therapy:   Appointment Date/Time: Wednesday, December 8th @ 10:00am (IN-PERSON)   Therapist: Laina     Address: [facility]     Phone Number:  [phone]    Abilify Maintena 400 mg Injection Appointment: 12/4/2021 (Per records)    Physical Therapy Appointment:   Date/Time: 12/3/2021 9:40 AM Provider: Farida Acosta PT   [facility]     : Jyoti Malik @ [facility] [phone]    CAD : Judy @ [facility] [phone]    Skills Worker: Carlos @ [facility] [address] [place] [address] [phone]      Attend all scheduled appointments with your outpatient providers. Call at least 24 hours in advance if you need to reschedule an appointment to ensure continued access to your outpatient providers.     Major Treatments, Procedures and Findings:  You were provided with: a psychiatric assessment, assessed for medical stability, medication evaluation and/or management and group therapy    Symptoms to Report: feeling more aggressive, increased confusion, losing more sleep, mood getting worse or thoughts of suicide    Early warning signs can include: increased depression or anxiety sleep disturbances increased  "thoughts or behaviors of suicide or self-harm  increased unusual thinking, such as paranoia or hearing voices    Safety and Wellness:  Take all medicines as directed.  Make no changes unless your doctor suggests them.      Follow treatment recommendations.  Refrain from alcohol and non-prescribed drugs.  Ask your support system to help you reduce your access to items that could harm yourself or others. If there is a concern for safety, call 911.    Resources:   Crisis Intervention: 538.518.9917 or 913-962-1838 (TTY: 861.718.9852).  Call anytime for help.  National [place] on Mental Illness (www.[place].celine.org): 283.780.6452 or 424-411-4892.  National Suicide Prevention Line (www.mentalhealthmn.org): 019-772-HGUX (9937)  [facility] Crisis (COPE) Response - Adult [phone]  Text 4 Life: txt \"LIFE\" to 28706 for immediate support and crisis intervention  Crisis text line: Text \"[place]\" to 025197. Free, confidential, 24/7.    General Medication Instructions:   See your medication sheet(s) for instructions.   Take all medicines as directed.  Make no changes unless your doctor suggests them.   Go to all your doctor visits.  Be sure to have all your required lab tests. This way, your medicines can be refilled on time.  Do not use any drugs not prescribed by your doctor.  Avoid alcohol.    Advance Directives:   Scanned document on file with [employer]? No scanned doc  Is document scanned? Pt states no documents  Honoring Choices Your Rights Handout: Informed and given  Was more information offered? Materials given    The Treatment team has appreciated the opportunity to work with you. If you have any questions or concerns about your recent admission, you can contact the unit which can receive your call 24 hours a day, 7 days a week. They will be able to get in touch with a Provider if needed. The unit number is [phone] .        "

## 2021-12-04 ENCOUNTER — NURSE TRIAGE (OUTPATIENT)
Dept: NURSING | Facility: CLINIC | Age: 21
End: 2021-12-04
Payer: MEDICARE

## 2021-12-05 NOTE — TELEPHONE ENCOUNTER
"\"I recently lost my father (February), now all of a sudden I am getting really bad flashbacks both awake and when sleeping (nightmares). I see a therapist and my primary Dr: Jann Mena. Last seen by primary a couple of weeks ago. I missed my therapist appointment because I was in the hospital last week for suicide attempt. The pain won't go away and I need extra help.   I live in a group home and they know how I am feeling. I was up all night last night because I couldn't sleep. I have been really shakey--this is new. I get lightheaded and dizzy when I am up. (Advised to eat and drink plenty of fluids, and discuss with providers.)  Medications: my night meds make me wide awake, crying, up in pain all night, suffering; and my morning meds make me sleepy. I will talk to my Dr about this when I am next seen. She states she has been taking her medications. Appointment with PCP on Thursday 12/9. Therapist appointment 12/8. Psychiatrist on 12/10.     Triaged to a disposition of Call mental health professional within 24 hrs, which she agrees to do.     yNa Banegas RN Triage Nurse Advisor 7:44 PM 12/4/2021  Reason for Disposition    [1] Bipolar disorder (manic depression) AND [2] worsening (e.g., thinking less clearly, more agitated, less able to do activities of daily living)    Additional Information    Negative: Patient attempted suicide    Negative: Patient is threatening suicide now    Negative: Violent behavior, or threatening to physically hurt or kill someone    Negative: [1] Patient is very confused (disoriented, slurred speech) AND [2] no other adult (e.g., friend or family member) available    Negative: [1] Difficult to awaken or acting very confused (disoriented, slurred speech) AND [2] new onset    Negative: Drug overdose suspected    Negative: Sounds like a life-threatening emergency to the triager    Negative: Alcohol use, abuse or dependence, question or problem related to    Negative: Drug abuse or " "dependence, question or problem related to    Negative: [1] Bipolar disorder (manic depression) AND [2] unable to do any of normal activities (e.g., self care, school, work; in comparison to baseline).    Negative: Head is twisting to one side (or ask \"is it turning against your will?\")    Negative: Patient sounds very sick or weak to the triager    Protocols used: BIPOLAR DISORDER (MANIC DEPRESSION)-A-AH      "

## 2021-12-06 ENCOUNTER — NURSE TRIAGE (OUTPATIENT)
Dept: NURSING | Facility: CLINIC | Age: 21
End: 2021-12-06
Payer: MEDICARE

## 2021-12-06 NOTE — TELEPHONE ENCOUNTER
"Patient calls with concerns regarding chest pain. The pain comes and goes, can last 5-10 minutes up to 30 minutes. Chest pain has been present for over a week since she was in the hospital, she feels that the pain is worsening. Denies difficulty breathing, does say that she is lightheaded and dizzy at times. Patient is having difficulty sleeping and has been woken up due to the pain. Patient reports that she vomited twice prior to phone call and it was red in color.    Go to ED now per protocol. Patient verbalizes understanding, instructed to have someone drive her. She denies further questions or concerns.    Luana Cook RN  [facility] Nurse Advisors        Reason for Disposition    Dizziness or lightheadedness    [1] Intermittent  chest pain or \"angina\" AND [2] increasing in severity or frequency  (Exception: pains lasting a few seconds)    Additional Information    Negative: Severe difficulty breathing (e.g., struggling for each breath, speaks in single words)    Negative: Difficult to awaken or acting confused (e.g., disoriented, slurred speech)    Negative: Shock suspected (e.g., cold/pale/clammy skin, too weak to stand, low BP, rapid pulse)    Negative: [1] Chest pain lasts > 5 minutes AND [2] history of heart disease  (i.e., heart attack, bypass surgery, angina, angioplasty, CHF; not just a heart murmur)    Negative: [1] Chest pain lasts > 5 minutes AND [2] described as crushing, pressure-like, or heavy    Negative: [1] Chest pain lasts > 5 minutes AND [2] age > 50    Negative: [1] Chest pain lasts > 5 minutes AND [2] age > 30 AND [3] at least one cardiac risk factor (i.e., hypertension, diabetes, obesity, smoker or strong family history of heart disease)    Negative: [1] Chest pain lasts > 5 minutes AND [2] not relieved with nitroglycerin    Negative: Passed out (i.e., lost consciousness, collapsed and was not responding)    Negative: Heart beating < 50 beats per minute OR > 140 beats per " minute    Negative: Visible sweat on face or sweat dripping down face    Negative: Sounds like a life-threatening emergency to the triager    Negative: Followed a chest injury    Negative: SEVERE chest pain    Negative: Pain also present in shoulder(s) or arm(s) or jaw  (Exception: pain is clearly made worse by movement)    Negative: Difficulty breathing    Protocols used: CHEST PAIN-A-AH  COVID 19 Nurse Triage Plan/Patient Instructions    Please be aware that novel coronavirus (COVID-19) may be circulating in the community. If you develop symptoms such as fever, cough, or SOB or if you have concerns about the presence of another infection including coronavirus (COVID-19), please contact your health care provider or visit [email or web address].     Disposition/Instructions    ED Visit recommended. Follow protocol based instructions.     Bring Your Own Device:  Please also bring your smart device(s) (smart phones, tablets, laptops) and their charging cables for your personal use and to communicate with your care team during your visit.    Thank you for taking steps to prevent the spread of this virus.  o Limit your contact with others.  o Wear a simple mask to cover your cough.  o Wash your hands well and often.    Resources    [facility]: About COVID-19: [email or web address]    CDC: What to Do If You're Sick: www.cdc.gov/coronavirus/2019-ncov/about/steps-when-sick.html    CDC: Ending Home Isolation: www.cdc.gov/coronavirus/2019-ncov/hcp/disposition-in-home-patients.html     CDC: Caring for Someone: www.cdc.gov/coronavirus/2019-ncov/if-you-are-sick/care-for-someone.html     [facility]: Interim Guidance for Hospital Discharge to Home: www.health.[facility].[place].us/diseases/coronavirus/hcp/hospdischarge.pdf    [facility] clinical trials (COVID-19 research studies): [email or web address].[facility]/umn-clinical-trials     Below are the COVID-19 hotlines at the [place] Department of Health ([facility]).  Interpreters are available.   o For health questions: Call 501-537-5823 or 1-480.632.5462 (7 a.m. to 7 p.m.)  o For questions about schools and childcare: Call 970-652-8260 or 1-586.118.8796 (7 a.m. to 7 p.m.)

## 2021-12-09 ENCOUNTER — LAB REQUISITION (OUTPATIENT)
Dept: LAB | Facility: CLINIC | Age: 21
End: 2021-12-09
Payer: MEDICARE

## 2021-12-09 DIAGNOSIS — F31.9 BIPOLAR DISORDER, UNSPECIFIED (H): ICD-10-CM

## 2021-12-09 LAB — LITHIUM SERPL-SCNC: 0.4 MMOL/L

## 2021-12-09 PROCEDURE — 80178 ASSAY OF LITHIUM: CPT | Mod: ORL | Performed by: INTERNAL MEDICINE

## 2021-12-13 ENCOUNTER — HOSPITAL ENCOUNTER (EMERGENCY)
Facility: CLINIC | Age: 21
Discharge: HOME | End: 2021-12-13
Attending: PSYCHIATRY & NEUROLOGY | Admitting: PSYCHIATRY & NEUROLOGY
Payer: MEDICARE

## 2021-12-13 VITALS
WEIGHT: 219 LBS | BODY MASS INDEX: 37.59 KG/M2 | RESPIRATION RATE: 16 BRPM | OXYGEN SATURATION: 100 % | DIASTOLIC BLOOD PRESSURE: 65 MMHG | TEMPERATURE: 97.2 F | HEART RATE: 67 BPM | SYSTOLIC BLOOD PRESSURE: 116 MMHG

## 2021-12-13 DIAGNOSIS — F60.3 BORDERLINE PERSONALITY DISORDER (H): ICD-10-CM

## 2021-12-13 DIAGNOSIS — F43.0 ACUTE REACTION TO STRESS: ICD-10-CM

## 2021-12-13 PROCEDURE — 99284 EMERGENCY DEPT VISIT MOD MDM: CPT | Performed by: PSYCHIATRY & NEUROLOGY

## 2021-12-13 PROCEDURE — 90791 PSYCH DIAGNOSTIC EVALUATION: CPT

## 2021-12-13 PROCEDURE — 99285 EMERGENCY DEPT VISIT HI MDM: CPT | Mod: 25 | Performed by: PSYCHIATRY & NEUROLOGY

## 2021-12-13 PROCEDURE — 250N000013 HC RX MED GY IP 250 OP 250 PS 637: Performed by: PSYCHIATRY & NEUROLOGY

## 2021-12-13 RX ORDER — IBUPROFEN 600 MG/1
600 TABLET, FILM COATED ORAL ONCE
Status: COMPLETED | OUTPATIENT
Start: 2021-12-13 | End: 2021-12-13

## 2021-12-13 RX ORDER — OLANZAPINE 10 MG/1
10 TABLET, ORALLY DISINTEGRATING ORAL ONCE
Status: COMPLETED | OUTPATIENT
Start: 2021-12-13 | End: 2021-12-13

## 2021-12-13 RX ADMIN — OLANZAPINE 10 MG: 10 TABLET, ORALLY DISINTEGRATING ORAL at 22:23

## 2021-12-13 RX ADMIN — IBUPROFEN 600 MG: 600 TABLET, FILM COATED ORAL at 20:40

## 2021-12-13 ASSESSMENT — ENCOUNTER SYMPTOMS
ABDOMINAL PAIN: 1
NERVOUS/ANXIOUS: 1
HYPERACTIVE: 0
NEUROLOGICAL NEGATIVE: 1
HALLUCINATIONS: 0
EYES NEGATIVE: 1
MYALGIAS: 1
DECREASED CONCENTRATION: 1
DYSPHORIC MOOD: 1
ARTHRALGIAS: 1
RESPIRATORY NEGATIVE: 1
CARDIOVASCULAR NEGATIVE: 1
CONSTITUTIONAL NEGATIVE: 1

## 2021-12-14 ENCOUNTER — APPOINTMENT (OUTPATIENT)
Dept: GENERAL RADIOLOGY | Facility: CLINIC | Age: 21
End: 2021-12-14
Attending: EMERGENCY MEDICINE
Payer: MEDICARE

## 2021-12-14 ENCOUNTER — HOSPITAL ENCOUNTER (EMERGENCY)
Facility: CLINIC | Age: 21
Discharge: HOME | End: 2021-12-14
Attending: EMERGENCY MEDICINE | Admitting: EMERGENCY MEDICINE
Payer: MEDICARE

## 2021-12-14 VITALS
TEMPERATURE: 97.1 F | SYSTOLIC BLOOD PRESSURE: 125 MMHG | DIASTOLIC BLOOD PRESSURE: 69 MMHG | HEART RATE: 84 BPM | OXYGEN SATURATION: 99 % | RESPIRATION RATE: 14 BRPM

## 2021-12-14 DIAGNOSIS — F33.1 MAJOR DEPRESSIVE DISORDER, RECURRENT EPISODE, MODERATE (H): ICD-10-CM

## 2021-12-14 DIAGNOSIS — F43.12 PROLONGED POSTTRAUMATIC STRESS DISORDER: ICD-10-CM

## 2021-12-14 DIAGNOSIS — F71 MODERATE INTELLECTUAL DISABILITIES: ICD-10-CM

## 2021-12-14 DIAGNOSIS — F60.3 BORDERLINE PERSONALITY DISORDER (H): ICD-10-CM

## 2021-12-14 DIAGNOSIS — F60.3 EXPLOSIVE PERSONALITY DISORDER (H): ICD-10-CM

## 2021-12-14 PROCEDURE — 99284 EMERGENCY DEPT VISIT MOD MDM: CPT | Performed by: EMERGENCY MEDICINE

## 2021-12-14 PROCEDURE — 250N000013 HC RX MED GY IP 250 OP 250 PS 637: Performed by: EMERGENCY MEDICINE

## 2021-12-14 PROCEDURE — 99285 EMERGENCY DEPT VISIT HI MDM: CPT | Mod: 25

## 2021-12-14 PROCEDURE — 90791 PSYCH DIAGNOSTIC EVALUATION: CPT

## 2021-12-14 PROCEDURE — 71045 X-RAY EXAM CHEST 1 VIEW: CPT

## 2021-12-14 RX ORDER — OLANZAPINE 5 MG/1
7.5 TABLET, ORALLY DISINTEGRATING ORAL AT BEDTIME
COMMUNITY
End: 2022-07-28

## 2021-12-14 RX ORDER — OLANZAPINE 5 MG/1
5 TABLET, ORALLY DISINTEGRATING ORAL ONCE
Status: COMPLETED | OUTPATIENT
Start: 2021-12-14 | End: 2021-12-14

## 2021-12-14 RX ADMIN — OLANZAPINE 5 MG: 5 TABLET, ORALLY DISINTEGRATING ORAL at 18:49

## 2021-12-14 NOTE — ED TRIAGE NOTES
Patient reports that she lost her father in feb and a baby sister a birth ( many years ago) recently was a a baby shower and was triggered by the emotions and no longer wants to live.     Patient does not contract for safety with RN. Patient want to bite and scratch herself to death.

## 2021-12-14 NOTE — ED TRIAGE NOTES
EMS reports picking patient at a clinic for suicidal ideation, was seen and discharged at [facility] yesterday. Group home was advised to bring patient to ED and brought to clinic instead. Scars present from previous self harm. Reports of history of attempts. Patient's current plan is biting and scratching self until bleeding to death. Reports passing of father this past February to be a significant factor in SI.

## 2021-12-14 NOTE — ED PROVIDER NOTES
ED Provider Note  [facility]      History     Chief Complaint   Patient presents with     Suicidal     HPI  Sadaf Ross is a 21 year old female who is here as she called 911 due to feeling suicidal. Patient is well-known to us from her multiple ED visits. She last was hospitalized over Thanksgiving 2021. She has been taking her meds. She has been struggling with tolerating generalized body discomfort. Patient has very poor coping skills to stress and discomfort. She was given a dose of Zyprexa while she waited to be seen by the . She was able to name several coping strategies and reports being able to be safe if returned to her group home which is comfortable with having her return.    Please see DEC Crisis Assessment on 12/13/21 in Epic for further details.    PERSONAL MEDICAL HISTORY  Past Medical History:   Diagnosis Date     ADHD (attention deficit hyperactivity disorder)      Bipolar 1 disorder (H)      Borderline personality disorder (H)      Depression      Depressive disorder      Intellectual disability      Obesity      Syncope      PAST SURGICAL HISTORY  Past Surgical History:   Procedure Laterality Date     APPENDECTOMY       APPENDECTOMY       FAMILY HISTORY  Family History   Problem Relation Age of Onset     Diabetes Type 1 Father      Cancer Paternal Grandfather      SOCIAL HISTORY  Social History     Tobacco Use     Smoking status: Current Some Day Smoker     Packs/day: 1.00     Years: 5.00     Pack years: 5.00     Types: Cigarettes     Smokeless tobacco: Never Used   Substance Use Topics     Alcohol use: No     MEDICATIONS  Current Facility-Administered Medications   Medication     OLANZapine zydis (zyPREXA) ODT tab 10 mg     Current Outpatient Medications   Medication     ARIPiprazole ER (ABILIFY MAINTENA) 400 MG extended release inj syringe     benztropine (COGENTIN) 0.5 MG tablet     calcium carbonate (TUMS) 500 MG chewable tablet     chlorhexidine  (CHLORHEXIDINE) 0.12 % solution     docusate sodium (COLACE) 100 MG capsule     hydrOXYzine (ATARAX) 50 MG tablet     metoclopramide (REGLAN) 10 MG tablet     naltrexone (DEPADE/REVIA) 50 MG tablet     norgestimate-ethinyl estradiol (SPRINTEC 28) 0.25-35 MG-MCG tablet     omeprazole (PRILOSEC) 40 MG DR capsule     ondansetron (ZOFRAN) 4 MG tablet     polyethylene glycol (MIRALAX) 17 g packet     Vitamin D, Cholecalciferol, 25 MCG (1000 UT) TABS     ALLERGIES  Allergies   Allergen Reactions     Penicillins Rash and Unknown          Review of Systems   Constitutional: Negative.    HENT: Negative.    Eyes: Negative.    Respiratory: Negative.    Cardiovascular: Negative.    Gastrointestinal: Positive for abdominal pain.   Genitourinary: Negative.    Musculoskeletal: Positive for arthralgias and myalgias.   Skin: Negative.    Neurological: Negative.    Psychiatric/Behavioral: Positive for decreased concentration, dysphoric mood and suicidal ideas. Negative for hallucinations. The patient is nervous/anxious. The patient is not hyperactive.    All other systems reviewed and are negative.        Physical Exam   BP: (!) 153/85  Pulse: 93  Temp: 99.9  F (37.7  C)  Resp: 18  Weight: 99.3 kg (219 lb)  SpO2: 98 %  Physical Exam  Vitals and nursing note reviewed.   HENT:      Head: Normocephalic.   Eyes:      Pupils: Pupils are equal, round, and reactive to light.   Pulmonary:      Effort: Pulmonary effort is normal.   Musculoskeletal:         General: Normal range of motion.      Cervical back: Normal range of motion.   Neurological:      General: No focal deficit present.      Mental Status: She is alert.   Psychiatric:         Attention and Perception: Attention and perception normal. She does not perceive auditory or visual hallucinations.         Mood and Affect: Mood normal. Affect is labile.         Speech: Speech normal.         Behavior: Behavior normal. Behavior is not agitated, aggressive, hyperactive or combative.  Behavior is cooperative.         Thought Content: Thought content normal. Thought content is not paranoid or delusional. Thought content does not include homicidal or suicidal ideation.         Cognition and Memory: Cognition and memory normal.         Judgment: Judgment normal.         ED Course      Procedures         Mental Health Risk Assessment      PSS-3    Date and Time Over the past 2 weeks have you felt down, depressed, or hopeless? Over the past 2 weeks have you had thoughts of killing yourself? Have you ever attempted to kill yourself? When did this last happen? User   12/13/21 1828 yes yes yes more than 6 months ago ARV      C-SSRS ([place])    Date and Time Q1 Wished to be Dead (Past Month) Q2 Suicidal Thoughts (Past Month) Q3 Suicidal Thought Method Q4 Suicidal Intent without Specific Plan Q5 Suicide Intent with Specific Plan Q6 Suicide Behavior (Lifetime) Within the Past 3 Months? RETIRED: Level of Risk per Screen Screening Not Complete User   12/13/21 1828 yes yes yes no yes no -- -- -- ARV              Suicide assessment completed by mental health (D.E.C., LCSW, etc.)       No results found for any visits on 12/13/21.  Medications   OLANZapine zydis (zyPREXA) ODT tab 10 mg (has no administration in time range)   ibuprofen (ADVIL/MOTRIN) tablet 600 mg (600 mg Oral Given 12/13/21 2040)        Assessments & Plan (with Medical Decision Making)   Patient with history of borderline personality disorder and poor coping skills. She typically seeks ED visit/hospitalization to get her needs met. She has calmed down here in [facility] while she waited for a DEC Assessment and had taken a dose of Zyprexa. She feels ready to return to her group home and to work on her coping skills with her established care services. Patient is transported back via cab. She is to follow-up established care and services.    I have reviewed the nursing notes. I have reviewed the findings, diagnosis, plan and need for follow up with the  patient.    New Prescriptions    No medications on file       Final diagnoses:   Borderline personality disorder (H)   Acute reaction to stress       --  Magno Sena MD  [facility] EMERGENCY DEPARTMENT  12/13/2021     Magno Sena MD  12/13/21 2460

## 2021-12-14 NOTE — ED NOTES
12/13/2021  Sadaf Ross 2000     [facility] Crisis Assessment    Patient was assessed: in person  Patient location: [facility]    Referral Data and Chief Complaint  Sadaf Ross is a 21 year old who uses her/hers pronouns. Patient presented to the ED other: group home staff member and was referred to the ED by self. Patient is presenting to the ED for the following concerns: suicidal thoughts.      Informed Consent and Assessment Methods    Patient is her own guardian. Writer met with patient and explained the crisis assessment process, including applicable information disclosures and limits to confidentiality, assessed understanding of the process, and obtained consent to proceed with the assessment. Patient was observed to be able to participate in the assessment as evidenced by patient's agreement to participate in the assessment, answer questions, and engage in safety planning.. Assessment methods included conducting a formal interview with patient, review of medical records, collaboration with medical staff, and obtaining relevant collateral information from family and community providers when available.    Narrative Summary of Presenting Problem and Current Functioning  What led to the patient presenting for crisis services, factors that make the crisis life threatening or complex, stressors, how is this disrupting the patient's life, and how current functioning is in comparison to baseline. How is patient presenting during the assessment.     The patient presents in the behavioral emergency center after experiencing suicidal thoughts at her group home.  The patient reports that she struggles with suicidal ideation, depression, and anxiety.  The patient was last hospitalized on 11/24/2021 and has a history of bipolar 1 disorder, borderline personality disorder, depression, intellectual disability, and chronic suicidal ideations.  The patient reports current suicidal ideations, was able to  engage in safety planning, and was able to list of tools to manage or combat the suicidal thoughts.    The patient reports that her mental health symptoms have felt more severe since the patient's father passed away earlier this year.  The patient resides in a group home and has a history of suicidal ideation, SIB urges, and somatic complaints.    History of the Crisis  Duration of the current crisis, coping skills attempted to reduce the crisis, community resources used, and past presentations.    The patient reports that today she experienced an increase in suicidal thoughts and that she tried to listen to music to distract herself.  The patient identified tools, such as calling a crisis line, that she did not utilize today.  The patient reported that group home staff members are aware of the tools she could use to manage suicidal ideation and to manage anxiety.    The patient has an individual therapist at [facility] Laina Rangelo,  Jyoti Malik, CADI  Judy at [facility], and a skills worker Carlos at [facility].     The patient was last admitted to unit for a on 11/25/2021 and was discharged on 12/2/2021.    Collateral Information    Risk Assessment    Risk of Harm to Self     ESS-6  1.a. Over the past 2 weeks, have you had thoughts of killing yourself? Yes  1.b. Have you ever attempted to kill yourself and, if yes, when did this last happen? Yes patient unable to recall date   2. Recent or current suicide plan? Yes stab herself   3. Recent or current intent to act on ideation? No  4. Lifetime psychiatric hospitalization? Yes  5. Pattern of excessive substance use? No  6. Current irritability, agitation, or aggression? No  Scoring note: BOTH 1a and 1b must be yes for it to score 1 point, if both are not yes it is zero. All others are 1 point per number. If all questions 1a/1b - 6 are no, risk is negligible. If one of 1a/1b is yes, then risk is mild. If either  question 2 or 3, but not both, is yes, then risk is automatically moderate regardless of total score. If both 2 and 3 are yes, risk is automatically high regardless of total score.     Score: 3, moderate risk    The patient has the following risk factors for suicide: depressive symptoms, health stressors, isolation, poor decision making, prior suicide attempt and recent loss    Is the patient experiencing current suicidal ideation: Yes. Plan: stab herself but no intent    Is the patient engaging in preparatory suicide behaviors (formulating how to act on plan, giving away possessions, saying goodbye, displaying dramatic behavior changes, etc)? No    Does the patient have access to firearms or other lethal means? no    The patient has the following protective factors: established relationship community mental health provider(s), expresses desire to engage in treatment and safe/stable housing    Support system information: The patient has an individual therapist at [facility] Laina Rangelo,  Jyoti Malik, CADI  Judy at [facility], and a skills worker Carlos at [facility].       Patient strengths: The patient has many skills and tools she was able to list off to manage anxiety, depression, and suicidal thoughts.    Does the patient engage in non-suicidal self-injurious behavior (NSSI/SIB)? yes. Method:scratching her forearm Frequency:weekly Duration:when anxious History: NA    Is the patient vulnerable to sexual exploitation?  Yes pt is a vulnerable adult    Is the patient experiencing abuse or neglect? no    Is the patient a vulnerable adult? Yes      Risk of Harm to Others  The patient has the following risk factors of harm to others: no risk factors identified    Does the patient have thoughts of harming others? No    Is the patient engaging in sexually inappropriate behavior?  no       Current Substance Abuse    Is there recent substance abuse? no    Was a urine  drug screen or blood alcohol level obtained: No    CAGE AID  Have you felt you ought to cut down on your drinking or drug use?  No  Have people annoyed you by criticizing your drinking or drug use? No  Have you felt bad or guilty about your drinking or drug use? No  Have you ever had a drink or used drugs first thing in the morning to steady your nerves or to get rid of a hangover? No  Score: 0/4       Current Symptoms/Concerns    Symptoms  Attention, hyperactivity, and impulsivity symptoms present: No    Anxiety symptoms present: Yes: Generalized Symptoms: Avoidance, Excessive worry and Somatic symptoms - abdominal pain, headache, or tension      Appetite symptoms present: No     Behavioral difficulties present: No     Cognitive impairment symptoms present: Yes: Decision-Making and Judgment/Insight    Depressive symptoms present: Yes Depressed mood, Feelings of helplessness , Feelings of hopelessness , Feelings of worthlessness , Impaired concentration, Impaired decision making , Low self esteem , Sleep disturbance , Somatic complaints and Thoughts of suicide/death      Eating disorder symptoms present: No    Learning disabilities, cognitive challenges, and/or developmental disorder symptoms present: Yes: Communication, Developmental Incidents and Mood     Manic/hypomanic symptoms present: No    Personality and interpersonal functioning difficulties present : No    Psychosis symptoms present: No      Sleep difficulties present: No    Substance abuse disorder symptoms present: No     Trauma and stressor related symptoms present: No           Mental Status Exam   Affect: Flat   Appearance: Appropriate    Attention Span/Concentration: Attentive?    Eye Contact: Engaged and Variable   Fund of Knowledge: Appropriate    Language /Speech Content: Fluent   Language /Speech Volume: Soft    Language /Speech Rate/Productions: Normal    Recent Memory: Intact   Remote Memory: Intact   Mood: Depressed    Orientation to Person:  Yes    Orientation to Place: Yes   Orientation to Time of Day: Yes    Orientation to Date: Yes    Situation (Do they understand why they are here?): Yes    Psychomotor Behavior: Normal    Thought Content: Clear   Thought Form: Intact       Mental Health and Substance Abuse History    History  Current and historical diagnoses or mental health concerns: F41.1 General Anxiety Disorder. Historical diagnosis of Borderline personality disorder, history of bipolar disorder, intellectual disability.    Prior MH services (inpatient, programmatic care, outpatient, etc) : Yes pt was last hospitilized on unit 4A from 11/25-12/02/2021. The pt has an outpatient therapist, a skills worker, and DBT program.    History of substance abuse: No    Prior LIZZETTE services (inpatient, programmatic care, detox, outpatient, etc) : No    History of commitment: No    Family history of MH/LIZEZTTE: No    Trauma history: No    Medication  Psychotropic medications: Yes. Pt is currently taking Abilify, hydroxyzine, lithium, venlafaxine . Medication compliant: Yes. Recent medication changes: No    Current Care Team  Primary Care Provider: Yes. Name: [facility]. Location: [address] [place] [address] [phone]. Date of last visit: unknown. Frequency: unknown. Perceived helpfulness: NA.    Psychiatrist: Yes. Name: Emma Jacobson (New provider for patient). Location: [facility]. Date of last visit: unknown. Frequency: Unknown. Perceived helpfulness: helpful.    Therapist: Yes. Name: Shannen Ruiz. Location: [facility]. Date of last visit: Unknown. Frequency: Weekly. Perceived helpfulness: Helpful.    : Yes. Name: Jyoti Malik. Location: . Date of last visit: unknown. Frequency: unknown. Perceived helpfulness: helpful.    CTSS or ARMHS: No    ACT Team: No    Other: No    Release of Information  Was a release of information signed: Yes. Providers included on the release: Treva Michelle      Biopsychosocial  Information    Socioeconomic Information  Current living situation: Lives at [facility], group home in [place]    Employment/income source: NA    Relevant legal issues: None    Cultural, Jain, or spiritual influences on mental health care: Patient denies any cultural or Jain influences on her mental health care.    Is the patient active in the  or a : No      Relevant Medical Concerns   Patient identifies concerns with completing ADLs? No     Patient can ambulate independently? Yes     Other medical concerns? No     History of concussion or TBI? No        Diagnosis    300.02 (F41.1) Generalized Anxiety Disorder - by history     301.83 (F60.3) Borderline Personality Disorder - by history     Other Unspecified and Specified Bipolar and Related Disorder 296.80 (F31.9) Unspecified Bipolar and Related Disorder - by history       Therapeutic Intervention  The following therapeutic methodologies were employed when working with the patient: establishing rapport, active listening, assessing dimensions of crisis, solution focused brief therapy, safety planning, psychoeducation and motivational interviewing. Patient response to intervention: Patient was engaged in safety and crisis planning.      Disposition  Recommended disposition: Other: Discharge to group home with instructions to continue work with established providers.      Reviewed case and recommendations with attending provider. Attending Name: Dr. Sena      Attending concurs with disposition: Yes      Patient concurs with disposition: Yes      Guardian concurs with disposition: NA     Final disposition: Other: discharge to group home with instructions to continue work with established providers.     Inpatient Details (if applicable):  Is patient admitted voluntarily:NA    Patient aware of potential for transfer if there is not appropriate placement? NA     Patient is willing to travel outside of the [facility] for placement?  NA      Behavioral Intake Notified? NA       Clinical Substantiation of Recommendations   Rationale with supporting factors for disposition and diagnosis.     Patient is a resident at a group home, has been to the ED for mental health related reasons often, and was last hospitalized at [place] on unit 4A from 11/25-12/02/2021. The pt appears to be struggling with depression surrounding the February 2021 death of her father. is expressing suicidal thoughts but denies that she wants to die, just wants to be with her father.     The pt reports current suicidal ideation, states a plan of using a knife, but does not have intention of harming herself. The pt is able to list tools and skills to combat suicidal ideation, was able to contract for safety, and engage in crisis and safety planning. The patient reports SIB of scratching her forearms, denies hallucinations, denies delusions, and denies HI.    The pt is currently not at risk of suicide or self harm/ Patient is recommended to return to her group home this evening following stabilization in the ED.       Assessment Details  Patient interview started at: 9:26 PM and completed at: 10:15 PM.    Total duration spent on the patient case in minutes: .75 hrs     CPT code(s) utilized: 28172 - Psychotherapy for Crisis - 60 (30-74*) min       Aftercare and Safety Planning  Follow up plans with MH/LIZZETTE services: No      Aftercare plan placed in the AVS and provided to patient: Yes. Given to patient by nurse    ELENA HUBBARD      Aftercare Plan  If I am feeling unsafe or I am in a crisis, I will:   Contact my established care providers   Call the National Suicide Prevention Lifeline: 390.351.7250   Go to the nearest emergency room   Call 751     Symptoms to Report: feeling more aggressive, increased confusion, losing more sleep, mood getting worse or thoughts of suicide     Early warning signs can include: increased depression or anxiety sleep disturbances increased  "thoughts or behaviors of suicide or self-harm  increased unusual thinking, such as paranoia or hearing voices     Safety and Wellness:  Take all medicines as directed.  Make no changes unless your doctor suggests them.      Follow treatment recommendations.  Refrain from alcohol and non-prescribed drugs.  Ask your support system to help you reduce your access to items that could harm yourself or others. If there is a concern for safety, call 911.     Resources:   Crisis Intervention: 189.865.1872 or 288-637-4692 (TTY: 861.504.8686).  Call anytime for help.  National [place] on Mental Illness (www.[place].celine.org): 135.598.5607 or 806-163-1863.  National Suicide Prevention Line (www.mentalhealthmn.org): 995-976-VJCZ (6281)  [facility] Crisis (COPE) Response - Adult 926 708-3878  Text 4 Life: txt \"LIFE\" to 57310 for immediate support and crisis intervention  Crisis text line: Text \"[place]\" to 032499. Free, confidential, 24/7.     General Medication Instructions:   See your medication sheet(s) for instructions.   Take all medicines as directed.  Make no changes unless your doctor suggests them.   Go to all your doctor visits.  Be sure to have all your required lab tests. This way, your medicines can be refilled on time.  Do not use any drugs not prescribed by your doctor.  Avoid alcohol.       Crisis Lines  Crisis Text Line  Text 453489  You will be connected with a trained live crisis counselor to provide support.    National Hope Line  1.800.SUICIDE [6491744]      Community Resources  Fast Tracker  Linking people to mental health and substance use disorder resources  fasttrackermn.org     [place] Mental Health Warm Line  Peer to peer support  Monday thru Saturday, 12 pm to 10 pm  688.748.3307 or 0.720.938.0115  Text \"Support\" to 76219    National [place] on Mental Illness (CELINE)  054.898.8341 or 1.888.CELINE.HELPS        Mental Health Apps  My3  https://[employer]org/    TheFamilyeBox  " https://[employer].org/apps/virtual-hope-box/

## 2021-12-14 NOTE — ED NOTES
Patient Verbalized understanding of discharge instructions including medication administration and recommended follow up care as noted on discharge instructions. Written discharge instructions given, denies any further questions. Cab ride set up for the patient to go back to her [facility].

## 2021-12-14 NOTE — DISCHARGE INSTRUCTIONS
"Symptoms to Report: feeling more aggressive, increased confusion, losing more sleep, mood getting worse or thoughts of suicide     Early warning signs can include: increased depression or anxiety sleep disturbances increased thoughts or behaviors of suicide or self-harm  increased unusual thinking, such as paranoia or hearing voices     Safety and Wellness:  Take all medicines as directed.  Make no changes unless your doctor suggests them.      Follow treatment recommendations.  Refrain from alcohol and non-prescribed drugs.  Ask your support system to help you reduce your access to items that could harm yourself or others. If there is a concern for safety, call 911.     Resources:   Crisis Intervention: 770.247.7205 or 770-972-0248 (TTY: 354.519.8959).  Call anytime for help.  National [place] on Mental Illness (www.mn.celine.org): 269.973.4545 or 213-687-0006.  National Suicide Prevention Line (www.mentalhealthmn.org): 471-172-BYDK (0202)  [facility] Crisis (COPE) Response - Adult 857 787-0081  Text 4 Life: txt \"LIFE\" to 77453 for immediate support and crisis intervention  Crisis text line: Text \"[place]\" to 661932. Free, confidential, 24/7.     General Medication Instructions:   See your medication sheet(s) for instructions.   Take all medicines as directed.  Make no changes unless your doctor suggests them.   Go to all your doctor visits.  Be sure to have all your required lab tests. This way, your medicines can be refilled on time.  Do not use any drugs not prescribed by your doctor.  Avoid alcohol.     "

## 2021-12-15 LAB
ATRIAL RATE - MUSE: 76 BPM
DIASTOLIC BLOOD PRESSURE - MUSE: NORMAL MMHG
INTERPRETATION ECG - MUSE: NORMAL
P AXIS - MUSE: 41 DEGREES
PR INTERVAL - MUSE: 190 MS
QRS DURATION - MUSE: 92 MS
QT - MUSE: 388 MS
QTC - MUSE: 436 MS
R AXIS - MUSE: 26 DEGREES
SYSTOLIC BLOOD PRESSURE - MUSE: NORMAL MMHG
T AXIS - MUSE: 0 DEGREES
VENTRICULAR RATE- MUSE: 76 BPM

## 2021-12-15 NOTE — ED NOTES
Patient will discharge back to her group home ([address], [place] [address]). Group home staff ([facility] [phone]) asks that the patient arrive after 11pm due to staffing. They do not have someone to come get her and require that she arrive via taxi cab. Writer alerted patient to the discharge who then refused to answer any further questions. Patient refused to sign the DANDY. Writer left information for attending RN to call transport when the patient has been discharged. Safety plan is entered into discharge paperwork.    Bernice Taylor, LÁZAROSW

## 2021-12-15 NOTE — ED NOTES
Group home called and notified pt. will be transported back to group [place].  Staff said to call pt. a cab for transport back.  Cab called. Pt. will wait in ED for cab to arrive.

## 2021-12-15 NOTE — DISCHARGE INSTRUCTIONS
"Aftercare Plan    Follow up with your outpatient therapist and psychiatrist at [facility] and [facility]. Follow up with your  at [facility]. Use your coping skills and talk openly about your symptoms with your group home staff.    If I am feeling unsafe or I am in a crisis, I will:   FIRST use my coping skills, talk to my group home staff, and contact my established care providers   Call the [facility] crisis team  Go to the nearest emergency room   Call 911      Symptoms to Report: feeling more aggressive, increased confusion, losing more sleep, mood getting worse or thoughts of suicide with a plan and intent that can not be improved with coping skills     Early warning signs can include: increased depression or anxiety sleep disturbances increased thoughts or behaviors of suicide or self-harm  increased unusual thinking, such as paranoia or hearing voices     Safety and Wellness:  Take all medicines as directed.  Make no changes unless your doctor suggests them.      Follow treatment recommendations.  Refrain from alcohol and non-prescribed drugs.  Ask your support system to help you reduce your access to items that could harm yourself or others. If there is a concern for safety, call 911.     Resources:   Crisis Intervention: 660.622.4334 or 296-495-1380 (TTY: 685.725.4482).  Call anytime for help.  National [place] on Mental Illness (www.[place].celine.org): 303.740.2254 or 846-540-6230.  National Suicide Prevention Line (www.mentalhealthmn.org): 981-938-LKYR (3715)  [facility] Crisis (COPE) Response - Adult 723 045-8385  Text 4 Life: txt \"LIFE\" to 02144 for immediate support and crisis intervention  Crisis text line: Text \"[place]\" to 601992. Free, confidential, 24/7.     General Medication Instructions:   See your medication sheet(s) for instructions.   Take all medicines as directed.  Make no changes unless your doctor suggests them.   Go to all your doctor visits.  Be sure to have all your " "required lab tests. This way, your medicines can be refilled on time.  Do not use any drugs not prescribed by your doctor.  Avoid alcohol.     Crisis Lines  Crisis Text Line  Text 809446  You will be connected with a trained live crisis counselor to provide support.     National Hope Line  1.800.SUICIDE [2507847]     Community Resources  Fast Tracker  Linking people to mental health and substance use disorder resources  Lyatissn.org      [place] [facility] Warm Line  Peer to peer support  Monday thru Saturday, 12 pm to 10 pm  323.593.3617 or 0.046.148.5777  Text \"Support\" to 90991     National [place] on Mental Illness (MAGY)  538.817.3496 or 1.888.MAGY.HELPS     Mental Health Apps  My3  https://myHadron Systemspp.org/     VirtualHopeBox  https://Groupe-Allomedia.org/apps/virtual-hope-box/  "

## 2021-12-15 NOTE — ED NOTES
Report called to Arleen at the patient's group home. Report accepted. Instructed to not send patient until after 11pm for adequate staffing. Questions answered. Group home number is [phone]
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
## 2021-12-15 NOTE — ED PROVIDER NOTES
ED Provider Note  [facility]      History     Chief Complaint   Patient presents with     Suicidal     Reports suicidal ideation with plan. Group home brought patient to clinic who then called EMS to bring to ED.      The history is provided by the patient and medical records.     Sadaf Ross is a 21 year old female with history of borderline personality disorder, bipolar disorder, and developmental delay presenting to the ED for suicidal ideation.  Patient has chronic suicidal ideation and frequent ED visits for mental health, most recently yesterday for suicidal ideation.  Patient generally called 911 herself.  Today they had a care meeting about patient not calling 911. The patient subsequently took an er cab staff to urgent care, who then sent her to the ED.  Patient endorses SI with plan of scratching or cutting herself.  She has no access to sharps or anything to hurt herself with that her group home.    Here in the emergency department, the patient did develop chest pain after she had been here for some time. She complains of a sharp pain in the mid sternal region. No radiation. No dyspnea. She states that she gets this frequently when she is feeling stressed. She denies any leg pain or swelling. No cough or hemoptysis. No fever.    Past Medical History  Past Medical History:   Diagnosis Date     ADHD (attention deficit hyperactivity disorder)      Bipolar 1 disorder (H)      Borderline personality disorder (H)      Depression      Depressive disorder      Intellectual disability      Obesity      Syncope      Past Surgical History:   Procedure Laterality Date     APPENDECTOMY       APPENDECTOMY       ARIPiprazole ER (ABILIFY MAINTENA) 400 MG extended release inj syringe  benztropine (COGENTIN) 0.5 MG tablet  calcium carbonate (TUMS) 500 MG chewable tablet  chlorhexidine (CHLORHEXIDINE) 0.12 % solution  docusate sodium (COLACE) 100 MG capsule  hydrOXYzine (ATARAX) 50 MG  tablet  metoclopramide (REGLAN) 10 MG tablet  naltrexone (DEPADE/REVIA) 50 MG tablet  norgestimate-ethinyl estradiol (SPRINTEC 28) 0.25-35 MG-MCG tablet  OLANZapine zydis (ZYPREXA) 5 MG ODT  omeprazole (PRILOSEC) 40 MG DR capsule  polyethylene glycol (MIRALAX) 17 g packet  Vitamin D, Cholecalciferol, 25 MCG (1000 UT) TABS  ondansetron (ZOFRAN) 4 MG tablet      Allergies   Allergen Reactions     Penicillins Rash and Unknown     Family History  Family History   Problem Relation Age of Onset     Diabetes Type 1 Father      Cancer Paternal Grandfather      Social History   Social History     Tobacco Use     Smoking status: Current Some Day Smoker     Packs/day: 1.00     Years: 5.00     Pack years: 5.00     Types: Cigarettes     Smokeless tobacco: Never Used   Substance Use Topics     Alcohol use: No     Drug use: No      Past medical history, past surgical history, medications, allergies, family history, and social history were reviewed with the patient. No additional pertinent items.       Review of Systems   Psychiatric/Behavioral: Positive for suicidal ideas.   All other systems reviewed and are negative.    A complete review of systems was performed with pertinent positives and negatives noted in the HPI, and all other systems negative.    Physical Exam   BP: 115/78  Pulse: 84  Temp: 97.1  F (36.2  C)  Resp: 12  SpO2: 99 %  Physical Exam    ED Course      Procedures            EKG Interpretation:      Interpreted by CHAIM CHOWDHURY MD, MD  Time reviewed: 1915  Symptoms at time of EKG: chest pain   Rhythm: normal sinus   Rate: 76  Axis: normal  Ectopy: none  Conduction: normal  ST Segments/ T Waves: No ST-T wave changes.  Poor R wave progression.  T wave inversion lead III  Q Waves: none  Comparison to prior: Unchanged from 7/31/2021    Clinical Impression: Nonspecific EKG    Results for orders placed or performed during the hospital encounter of 12/14/21   XR Chest Port 1 View     Status: None    Narrative    EXAM: XR  CHEST PORT 1 VIEW  LOCATION: [facility]  DATE/TIME: 12/14/2021 7:10 PM    INDICATION: chest pain  COMPARISON: 11/17/2021      Impression    IMPRESSION: Negative chest.   EKG 12-lead, tracing only     Status: None   Result Value Ref Range    Systolic Blood Pressure  mmHg    Diastolic Blood Pressure  mmHg    Ventricular Rate 76 BPM    Atrial Rate 76 BPM    KY Interval 190 ms    QRS Duration 92 ms     ms    QTc 436 ms    P Axis 41 degrees    R AXIS 26 degrees    T Axis 0 degrees    Interpretation ECG       Sinus rhythm  Normal ECG  Unconfirmed report - interpretation of this ECG is computer generated - see medical record for final interpretation  Confirmed by - EMERGENCY ROOM, PHYSICIAN ([id number]),  JESUS HONEYCUTT ([id number]) on 12/15/2021 8:45:30 AM          Mental Health Risk Assessment      PSS-3    Date and Time Over the past 2 weeks have you felt down, depressed, or hopeless? Over the past 2 weeks have you had thoughts of killing yourself? Have you ever attempted to kill yourself? When did this last happen? User   12/14/21 1812 yes yes yes more than 6 months ago MEM   12/14/21 1743 yes yes yes more than 6 months ago MEM      C-SSRS ([place])    Date and Time Q1 Wished to be Dead (Past Month) Q2 Suicidal Thoughts (Past Month) Q3 Suicidal Thought Method Q4 Suicidal Intent without Specific Plan Q5 Suicide Intent with Specific Plan Q6 Suicide Behavior (Lifetime) Within the Past 3 Months? RETIRED: Level of Risk per Screen Screening Not Complete User   12/14/21 1743 yes yes yes no yes yes -- -- -- MEM              Suicide assessment completed by mental health (D.E.C., LCSW, etc.)       Results for orders placed or performed during the hospital encounter of 12/14/21   XR Chest Port 1 View     Status: None    Narrative    EXAM: XR CHEST PORT 1 VIEW  LOCATION: [facility]  DATE/TIME: 12/14/2021 7:10 PM    INDICATION: chest  pain  COMPARISON: 11/17/2021      Impression    IMPRESSION: Negative chest.   EKG 12-lead, tracing only     Status: None   Result Value Ref Range    Systolic Blood Pressure  mmHg    Diastolic Blood Pressure  mmHg    Ventricular Rate 76 BPM    Atrial Rate 76 BPM    OH Interval 190 ms    QRS Duration 92 ms     ms    QTc 436 ms    P Axis 41 degrees    R AXIS 26 degrees    T Axis 0 degrees    Interpretation ECG       Sinus rhythm  Normal ECG  Unconfirmed report - interpretation of this ECG is computer generated - see medical record for final interpretation  Confirmed by - EMERGENCY ROOM, PHYSICIAN ([id number]),  JESUS HONEYCUTT ([id number]) on 12/15/2021 8:45:30 AM       Medications   OLANZapine zydis (zyPREXA) ODT tab 5 mg (5 mg Oral Given 12/14/21 1849)        Assessments & Plan (with Medical Decision Making)   21 year old female with history of borderline personality disorder, bipolar disorder, and intellectual delay to the emergency department from urgent care for assessment of suicidal ideations. The patient states that she is suicidal frequently but is in a group home setting to manage that issue. She does not have any plan or intent to harm herself. She was seen by the DEC and is felt appropriate for outpatient management with her already established care team. The patient did again complain of chest pain while here in the emergency department. Her EKG does not reveal any acute ischemic change and her chest radiograph is normal. Do not suspect ACS. With normal chest radiograph, do not suspect pneumonia or pneumothorax. The patient's chest pain resolved with treatment with Zyprexa here in the emergency department. Patient will be discharged back to her group home with plan for outpatient mental health follow-up.    I have reviewed the nursing notes. I have reviewed the findings, diagnosis, plan and need for follow up with the patient.    Discharge Medication List as of 12/14/2021 11:08 PM          Final  diagnoses:   Borderline personality disorder (H)   IJami, am serving as a trained medical scribe to document services personally performed by Ambrocio Ferro Md, MD, based on the provider's statements to me.     Ambrocio MCGINNIS Md, MD, was physically present and have reviewed and verified the accuracy of this note documented by Jami Martin.      --  Ambrocio Ferro Md  [facility] EMERGENCY DEPARTMENT  12/14/2021     Ambrocio Ferro MD  12/15/21 1113

## 2021-12-16 ENCOUNTER — HOSPITAL ENCOUNTER (EMERGENCY)
Facility: CLINIC | Age: 21
Discharge: HOME | End: 2021-12-17
Attending: EMERGENCY MEDICINE | Admitting: EMERGENCY MEDICINE
Payer: MEDICARE

## 2021-12-16 DIAGNOSIS — F60.3 BORDERLINE PERSONALITY DISORDER (H): ICD-10-CM

## 2021-12-16 PROCEDURE — 99285 EMERGENCY DEPT VISIT HI MDM: CPT | Mod: 25 | Performed by: EMERGENCY MEDICINE

## 2021-12-16 PROCEDURE — 99283 EMERGENCY DEPT VISIT LOW MDM: CPT | Performed by: EMERGENCY MEDICINE

## 2021-12-17 VITALS
DIASTOLIC BLOOD PRESSURE: 85 MMHG | HEART RATE: 79 BPM | OXYGEN SATURATION: 99 % | SYSTOLIC BLOOD PRESSURE: 138 MMHG | TEMPERATURE: 97.8 F | RESPIRATION RATE: 16 BRPM

## 2021-12-17 PROCEDURE — 90791 PSYCH DIAGNOSTIC EVALUATION: CPT
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
## 2021-12-17 NOTE — ED NOTES
[facility] ED Mental Health Handoff Note:       Brief HPI:  This is a 21 year old female signed out to me by Dr. Sinha.  See initial ED Provider note for full details of the presentation.     Home meds reviewed and ordered/administered: Yes    Medically stable for inpatient mental health admission: Yes.    Evaluated by mental health: Yes. The recommendation is for outpatient mental health treatment. Resources and plan given to patient.    Safety concerns: At the time I received sign out, there were no safety concerns.    Hold Status:  Active Orders   N/A            Exam:   Patient Vitals for the past 24 hrs:   BP Temp Temp src Pulse Resp SpO2   12/16/21 2202 135/82 98.3  F (36.8  C) Oral 88 18 99 %           ED Course:    Medications - No data to display         There were no significant events during my shift.    6:02 AM Patient feeling better and requesting discharge to home.      Impression:    ICD-10-CM    1. Borderline personality disorder (H)  F60.3        Plan:    1. Discharged.      RESULTS:   No results found for this visit on 12/16/21 (from the past 24 hour(s)).          AMBROCIO CHOWDHURY MD, Ambrocio Fernández MD  12/17/21 0603

## 2021-12-17 NOTE — ED NOTES
Writer spoke to EZEQUIEL Jacobs at Group Home. Arlene states we can cab pt back to facility and a nurse will be there when she gets to the group home. MD notified.

## 2021-12-17 NOTE — ED NOTES
Assessed patient in ED. Unable or unwilling to engage fully. Sleeping. To be reassessed in the morning for possible return to group home.

## 2021-12-17 NOTE — ED TRIAGE NOTES
Patient states that she is having SI and Ideation, she states her plan is to lay on the floor, bang her head, and bite.

## 2021-12-17 NOTE — ED NOTES
This writer spoke to EZEQUIEL Jacobs [facility], she said they don't have staff where Sadfa stayed until 0900. Patient was informed that she can't be discharged until then.

## 2021-12-17 NOTE — ED PROVIDER NOTES
ED Provider Note  [facility]      History     Chief Complaint   Patient presents with     Suicidal     From . Plans to hit her head on the ground or bite herself.     The history is provided by the patient, medical records and the EMS personnel.     Sadaf Ross is a 21 year old female with history of borderline personality disorder, bipolar disorder, and developmental delay presenting to the ED from [facility] home for suicidal ideation.  Patient has chronic suicidal ideation and frequent ED visits for mental health, most recently 12/14 for suicidal ideation. Per EMS patient with plan to hit her head on the ground and bite herself. Patient currently laying on the floor in her room, not wanting to talk further.     Past Medical History  Past Medical History:   Diagnosis Date     ADHD (attention deficit hyperactivity disorder)      Bipolar 1 disorder (H)      Borderline personality disorder (H)      Depression      Depressive disorder      Intellectual disability      Obesity      Syncope      Past Surgical History:   Procedure Laterality Date     APPENDECTOMY       APPENDECTOMY       ARIPiprazole ER (ABILIFY MAINTENA) 400 MG extended release inj syringe  benztropine (COGENTIN) 0.5 MG tablet  calcium carbonate (TUMS) 500 MG chewable tablet  chlorhexidine (CHLORHEXIDINE) 0.12 % solution  docusate sodium (COLACE) 100 MG capsule  hydrOXYzine (ATARAX) 50 MG tablet  metoclopramide (REGLAN) 10 MG tablet  naltrexone (DEPADE/REVIA) 50 MG tablet  norgestimate-ethinyl estradiol (SPRINTEC 28) 0.25-35 MG-MCG tablet  OLANZapine zydis (ZYPREXA) 5 MG ODT  omeprazole (PRILOSEC) 40 MG DR capsule  ondansetron (ZOFRAN) 4 MG tablet  polyethylene glycol (MIRALAX) 17 g packet  Vitamin D, Cholecalciferol, 25 MCG (1000 UT) TABS      Allergies   Allergen Reactions     Penicillins Rash and Unknown     Family History  Family History   Problem Relation Age of Onset     Diabetes Type 1 Father      Cancer Paternal  Grandfather      Social History   Social History     Tobacco Use     Smoking status: Current Some Day Smoker     Packs/day: 1.00     Years: 5.00     Pack years: 5.00     Types: Cigarettes     Smokeless tobacco: Never Used   Substance Use Topics     Alcohol use: No     Drug use: No      Past medical history, past surgical history, medications, allergies, family history, and social history were reviewed with the patient. No additional pertinent items.       Review of Systems   Psychiatric/Behavioral: Positive for suicidal ideas.     A complete review of systems was performed with pertinent positives and negatives noted in the HPI, and all other systems negative.    Physical Exam   BP: 135/82  Pulse: 88  Temp: 98.3  F (36.8  C)  Resp: 18  SpO2: 99 %  Physical Exam  Vitals and nursing note reviewed.   Constitutional:       General: She is not in acute distress.     Appearance: She is well-developed.   HENT:      Head: Normocephalic.   Eyes:      Extraocular Movements: Extraocular movements intact.   Pulmonary:      Effort: No respiratory distress.   Abdominal:      General: There is no distension.   Musculoskeletal:         General: No deformity.      Cervical back: Neck supple.   Skin:     General: Skin is dry.   Neurological:      Mental Status: She is alert.      Comments: Alert, answering occasional questions, otherwise stating she does not want to speak to me more, laying on the floor   Psychiatric:      Comments: States she is actively suicidal         ED Course      Procedures              No results found for any visits on 12/16/21.  Medications - No data to display     Assessments & Plan (with Medical Decision Making)   21-year-old female presents to us with a chief complaint of suicidal ideation.  She will be signed out to the oncoming provider pending mental health assessment and disposition decision    I have reviewed the nursing notes. I have reviewed the findings, diagnosis, plan and need for follow up  with the patient.    Discharge Medication List as of 12/17/2021  9:06 AM          Final diagnoses:   Borderline personality disorder (H)   I, Jami Martin, am serving as a trained medical scribe to document services personally performed by Richie Sinha DO, based on the provider's statements to me.     IRichie DO, was physically present and have reviewed and verified the accuracy of this note documented by Jami Martin.      --  Richie Sinha DO  [facility] EMERGENCY DEPARTMENT  12/16/2021     Richie Sinha DO  12/20/21 1540

## 2021-12-17 NOTE — ED NOTES
Bed: [facility]  Expected date:   Expected time:   Means of arrival:   Comments:  [address], 21 y.o. female, suicidal ideations, calm, 5 min

## 2021-12-19 ENCOUNTER — NURSE TRIAGE (OUTPATIENT)
Dept: NURSING | Facility: CLINIC | Age: 21
End: 2021-12-19
Payer: MEDICARE

## 2021-12-20 NOTE — TELEPHONE ENCOUNTER
Patient is calling to report anxiety due to Hugh coming and the loss of her Father in February.      Patient reports that she has a history of anxiety.    Has had thoughts of suicide ever since father passed in February.    Patient reports that she lives in a group home.     Patient reports having a couple means for committing suicide and has no intent to commit suicide.  Patient reports that she does not have access to meds or weapons and is not going to commit suicide now.  Patient reports that there is a , Sophie, on site and a nurse named Angela.     According to the protocol, patient should go to ED now.  RN requesting to speak with nurse or staff.  Phone disconnected and RN unable to reach patient and attempted to contact patient without sucess.  RN called 911 to report.  911 dispatch will send responder.    Flori Retana RN  12/19/21 8:03 PM  [facility] Nurse Advisor     COVID 19 Nurse Triage Plan/Patient Instructions    Please be aware that novel coronavirus (COVID-19) may be circulating in the community. If you develop symptoms such as fever, cough, or SOB or if you have concerns about the presence of another infection including coronavirus (COVID-19), please contact your health care provider or visit [email or web address].     Disposition/Instructions    Call to EMS/911 recommended. Follow protocol based instructions.     Bring Your Own Device:  Please also bring your smart device(s) (smart phones, tablets, laptops) and their charging cables for your personal use and to communicate with your care team during your visit.    Thank you for taking steps to prevent the spread of this virus.  o Limit your contact with others.  o Wear a simple mask to cover your cough.  o Wash your hands well and often.    Resources    [facility]: About COVID-19: [email or web address]    CDC: What to Do If You're Sick:  www.cdc.gov/coronavirus/2019-ncov/about/steps-when-sick.html    CDC: Ending Home Isolation: www.cdc.gov/coronavirus/2019-ncov/hcp/disposition-in-home-patients.html     CDC: Caring for Someone: www.cdc.gov/coronavirus/2019-ncov/if-you-are-sick/care-for-someone.html     [facility]: Interim Guidance for Hospital Discharge to Home: www.health.[facility].mn./diseases/coronavirus/hcp/hospdischarge.pdf    [facility] clinical trials (COVID-19 research studies): [email or web address]     Below are the COVID-19 hotlines at the [place] Department of Health ([facility]). Interpreters are available.   o For health questions: Call 399-017-6968 or 1-126.764.8028 (7 a.m. to 7 p.m.)  o For questions about schools and childcare: Call 601-739-1925 or 1-898.817.2350 (7 a.m. to 7 p.m.)     Reason for Disposition    Suicide thoughts, threats, attempts, or questions    [1] Patient is not threatening suicide now BUT [2] had SUICIDAL BEHAVIORS in past 3 months    Additional Information    Negative: Severe difficulty breathing (e.g., struggling for each breath, speaks in single words)    Negative: Bluish (or gray) lips or face now    Negative: Difficult to awaken or acting confused (e.g., disoriented, slurred speech)    Negative: Hysterical or combative behavior    Negative: Sounds like a life-threatening emergency to the triager    Negative: Chest pain    Negative: Palpitations, skipped heart beat, or rapid heart beat    Negative: Cough is main symptom    Negative: Patient attempted suicide    Negative: Patient is threatening suicide now    Negative: Violent behavior, or threatening to physically hurt or kill someone    Negative: [1] Patient is very confused (disoriented, slurred speech) AND [2] no other adult (e.g., friend or family member) available    Negative: [1] Difficult to awaken or acting very confused (disoriented, slurred speech) AND [2] new onset    Negative: Sounds like a life-threatening emergency to the  triager    Negative: Patient sounds very sick or weak to the triager    Negative: [1] Patient is not threatening suicide now BUT [2] has a suicide PLAN (e.g., overdose, gunshot) and ACCESS (e.g., collecting pills, gun in house)    Negative: [1] Depression symptoms (sadness, hopelessness, decreased energy) AND [2] unable to do any normal activities (e.g., self care, school, work; in comparison to baseline).    Protocols used: ANXIETY AND PANIC ATTACK-A-AH, SUICIDE [id number]

## 2021-12-21 ENCOUNTER — NURSE TRIAGE (OUTPATIENT)
Dept: NURSING | Facility: CLINIC | Age: 21
End: 2021-12-21
Payer: MEDICARE

## 2021-12-21 ENCOUNTER — HOSPITAL ENCOUNTER (EMERGENCY)
Facility: CLINIC | Age: 21
Discharge: HOME | End: 2021-12-21
Attending: FAMILY MEDICINE | Admitting: FAMILY MEDICINE
Payer: MEDICARE

## 2021-12-21 VITALS
SYSTOLIC BLOOD PRESSURE: 130 MMHG | HEART RATE: 94 BPM | RESPIRATION RATE: 16 BRPM | OXYGEN SATURATION: 98 % | TEMPERATURE: 98.1 F | DIASTOLIC BLOOD PRESSURE: 70 MMHG

## 2021-12-21 DIAGNOSIS — F79 INTELLECTUAL DISABILITY: Chronic | ICD-10-CM

## 2021-12-21 DIAGNOSIS — F60.3 BORDERLINE PERSONALITY DISORDER (H): Chronic | ICD-10-CM

## 2021-12-21 DIAGNOSIS — F32.A DEPRESSION, UNSPECIFIED DEPRESSION TYPE: ICD-10-CM

## 2021-12-21 PROCEDURE — 99285 EMERGENCY DEPT VISIT HI MDM: CPT | Mod: 25 | Performed by: FAMILY MEDICINE

## 2021-12-21 PROCEDURE — 99284 EMERGENCY DEPT VISIT MOD MDM: CPT | Performed by: FAMILY MEDICINE

## 2021-12-21 PROCEDURE — 90791 PSYCH DIAGNOSTIC EVALUATION: CPT

## 2021-12-21 ASSESSMENT — ENCOUNTER SYMPTOMS
EYE REDNESS: 0
COLOR CHANGE: 0
ABDOMINAL PAIN: 0
CONFUSION: 0
ARTHRALGIAS: 0
FEVER: 0
NECK STIFFNESS: 0
HEADACHES: 0
SHORTNESS OF BREATH: 0
DIFFICULTY URINATING: 0
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
## 2021-12-22 NOTE — ED NOTES
Writer spoke with the patient's  Arlene ([phone]). Arlene states that one of the group home staff members had a baby today, so Sadaf's facility is not staffed. The plan earlier today was for the patient to spend the night at a different house, and Sadaf was previously excited for this. Arlene states that the patient can be discharged to:    [address] [address]  [phone]    Patient typically needs to be transported back in a taxi cab or ambulance.     Bernice Taylor, LICSW

## 2021-12-22 NOTE — ED NOTES
Pt's group home was called and writer was able to speak with Anisa, per Anisa they are expecting the pt mary ( [facility] [address] [place] [address])  Pt was on the phone setting up her transport.

## 2021-12-22 NOTE — DISCHARGE INSTRUCTIONS
"Discharge to group home by medic cab continue with current outpatient services working with your [facility]  for any further resources.    Aftercare Plan  Follow up with your outpatient therapist and psychiatrist at [facility] and [facility]. Follow up with your  at [facility] (Judy [phone]). Use your coping skills and talk openly about your symptoms with your group home staff.    If I am feeling unsafe or I am in a crisis, I will:  FIRST use my coping skills, talk to my group home staff, and contact my established care providers  Call the [facility] crisis team  Symptoms to Report: feeling more aggressive, increased confusion, losing more sleep, mood getting worse or thoughts of suicide with a plan and intent that can not be improved with coping skills  Early warning signs can include: increased depression or anxiety sleep disturbances increased thoughts or behaviors of suicide or self-harm in  creased unusual thinking, such as paranoia or hearing voices  Safety and Wellness: Take all medicines as directed. Make no changesunless your doctor suggests them. Follow treatment recommendations. Refrain from alcohol and non-prescribed drugs. Ask  your support system to help you reduce your access to items that could harm yourself or others. If there is a concern for safety, call 911.  Resources:  Crisis Intervention: 768.129.1289 or 974-222-8621 (TTY: 549.653.8154).Call anytime for help.  National [place] on Mental Illness (www.[place].celine.org): 852.121.1840 xd702-964-7074.  National Suicide Prevention Line (www.mentalhealthmn.org): 809-291-NEVC (9480)  [facility] Crisis (COPE) Response - Adult 613 949-5736Text 4 Life: txt \"LIFE\" to 28130 for immediate support and crisis intervention  Crisis text line: Text \"[place]\" to 046619. Free, confidential, 24/7.  "

## 2021-12-22 NOTE — ED PROVIDER NOTES
"ED Provider Note  [facility]      History     Chief Complaint   Patient presents with     Suicidal     BIBA per report pt woke up feeling this way because her father passed away in February, plan: \"cutting self w/ knife\" per report pt does not have  access to knives, pt lives [facility]. Per pt she did not want to hurt self so she called 911.     The history is provided by the patient and medical records.     Sadaf Ross is a 21 year old female with history of borderline personality disorder, bipolar disorder, and developmental delay presenting to the ED from group home for suicidal ideation.  Patient has chronic suicidal ideation and frequent ED visits for mental health, most recently 12/14 and 12/16 for suicidal ideation.  Patient was seen by mental health .  Patient has recently had possession of a cell phone that she owns as she is currently her own guardian and the group home has been unable to set firm boundaries, but is working with her  on it.  Patient was reportedly having a good night tonight, ate dinner with another resident, was laughing, and was getting ready to go stay at another group home because staff at her group home just had a baby today.  As soon as staff turned her back patient called 911 reporting SI with a plan and intent to act on it.  Patient reports that she woke up feeling suicidal because her father passed away in February.  She reports plan to cut herself with a knife.  She is the only resident in her current group home due to her history of conflict with other residents.  She has one-to-one staffing and no access to any type of weapon or medication in her facility.  Here she is still endorsing suicidal ideation.  She has an appointment with her therapist scheduled on Thursday.  She has been compliant with her medications, which group home administers.  [facility] is comfortable taking her back.    Past Medical History  Past " Medical History:   Diagnosis Date     ADHD (attention deficit hyperactivity disorder)      Bipolar 1 disorder (H)      Borderline personality disorder (H)      Depression      Depressive disorder      Intellectual disability      Obesity      Syncope      Past Surgical History:   Procedure Laterality Date     APPENDECTOMY       APPENDECTOMY       ARIPiprazole ER (ABILIFY MAINTENA) 400 MG extended release inj syringe  benztropine (COGENTIN) 0.5 MG tablet  calcium carbonate (TUMS) 500 MG chewable tablet  chlorhexidine (CHLORHEXIDINE) 0.12 % solution  docusate sodium (COLACE) 100 MG capsule  hydrOXYzine (ATARAX) 50 MG tablet  metoclopramide (REGLAN) 10 MG tablet  naltrexone (DEPADE/REVIA) 50 MG tablet  norgestimate-ethinyl estradiol (SPRINTEC 28) 0.25-35 MG-MCG tablet  OLANZapine zydis (ZYPREXA) 5 MG ODT  omeprazole (PRILOSEC) 40 MG DR capsule  ondansetron (ZOFRAN) 4 MG tablet  polyethylene glycol (MIRALAX) 17 g packet  Vitamin D, Cholecalciferol, 25 MCG (1000 UT) TABS      Allergies   Allergen Reactions     Penicillins Rash and Unknown     Family History  Family History   Problem Relation Age of Onset     Diabetes Type 1 Father      Cancer Paternal Grandfather      Social History   Social History     Tobacco Use     Smoking status: Current Some Day Smoker     Packs/day: 1.00     Years: 5.00     Pack years: 5.00     Types: Cigarettes     Smokeless tobacco: Never Used   Substance Use Topics     Alcohol use: No     Drug use: No      Past medical history, past surgical history, medications, allergies, family history, and social history were reviewed with the patient. No additional pertinent items.       Review of Systems   Constitutional: Negative for fever.   HENT: Negative for congestion.    Eyes: Negative for redness.   Respiratory: Negative for shortness of breath.    Cardiovascular: Negative for chest pain.   Gastrointestinal: Negative for abdominal pain.   Genitourinary: Negative for difficulty urinating.    Musculoskeletal: Negative for arthralgias and neck stiffness.   Skin: Negative for color change.   Neurological: Negative for headaches.   Psychiatric/Behavioral: Positive for suicidal ideas (chronic). Negative for confusion.     A complete review of systems was performed with pertinent positives and negatives noted in the HPI, and all other systems negative.    Physical Exam   BP: 130/70  Pulse: 94  Temp: 98.1  F (36.7  C)  Resp: 16  Weight:  (manuel)  SpO2: 98 %  Physical Exam  Constitutional:       General: She is not in acute distress.     Appearance: She is not diaphoretic.   HENT:      Head: Atraumatic.      Mouth/Throat:      Pharynx: No oropharyngeal exudate.   Eyes:      General: No scleral icterus.     Pupils: Pupils are equal, round, and reactive to light.   Cardiovascular:      Heart sounds: Normal heart sounds.   Pulmonary:      Effort: No respiratory distress.      Breath sounds: Normal breath sounds.   Abdominal:      General: Bowel sounds are normal.      Palpations: Abdomen is soft.      Tenderness: There is no abdominal tenderness.   Musculoskeletal:         General: No tenderness.   Skin:     General: Skin is warm.      Findings: No rash.         ED Course      Procedures    The medical record was reviewed and interpreted.  Patient was seen and evaluated by the  please refer to their documentation in the note section of the epic chart dated 12/21/2021       Medications - No data to display     Assessments & Plan (with Medical Decision Making)       I have reviewed the nursing notes. I have reviewed the findings, diagnosis, plan and need for follow up with the patient.    Patient with underlying borderline personality disorder as well as intellectual disability currently living in a group home with history of depression patient made suicidal statement earlier today is now returned to baseline and is able to maintain safety at the group home she will be transported back to the group home for  continued outpatient services.    Final diagnoses:   Borderline personality disorder (H)   Intellectual disability   Depression, unspecified depression type   I, Jami Martin, am serving as a trained medical scribe to document services personally performed by Robert Olea MD, based on the provider's statements to me.     I, Robert Olea MD, was physically present and have reviewed and verified the accuracy of this note documented by Jami Martin.      --  Robert Olea MD  [facility] EMERGENCY DEPARTMENT  12/21/2021     Robert Olea MD  12/21/21 [id number]
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
## 2021-12-27 ENCOUNTER — MEDICAL CORRESPONDENCE (OUTPATIENT)
Dept: HEALTH INFORMATION MANAGEMENT | Facility: CLINIC | Age: 21
End: 2021-12-27
Payer: MEDICARE

## 2021-12-28 ENCOUNTER — NURSE TRIAGE (OUTPATIENT)
Dept: NURSING | Facility: CLINIC | Age: 21
End: 2021-12-28
Payer: MEDICARE

## 2021-12-29 NOTE — TELEPHONE ENCOUNTER
Pt called in states has dizziness.  The dizziness started 4 PM today.  The Pt states some what fainted.  No room spinning.  The Pt is able to stand and walk.  The dizziness was for 5 min.  The Pt feel lightheaded now.  145/87 P101.  Pt is shaking.  Has chest pain.  The pain is on her right side.  No vomit, no diarrhea.  No bleeding.  Pt is not pregnant.  The Pt states she is confused at this time.  Care advice given per protocol.  Patient agrees with care advice given.   Agreed to call back if he has additional symptoms or questions.      Jacob Parker Nurse Advisor 12/28/2021 6:54 PM      Reason for Disposition    Fainted, and still feels dizzy afterwards    Additional Information    Negative: Shock suspected (e.g., cold/pale/clammy skin, too weak to stand, low BP, rapid pulse)    Negative: Difficult to awaken or acting confused (e.g., disoriented, slurred speech)    Protocols used: DIZZINESS-A-OH

## 2022-01-05 ENCOUNTER — NURSE TRIAGE (OUTPATIENT)
Dept: NURSING | Facility: CLINIC | Age: 22
End: 2022-01-05
Payer: MEDICARE

## 2022-01-05 NOTE — TELEPHONE ENCOUNTER
"PCP:  [facility]   \"I have not been feeling well today. I vomited 4 times @ 4am. The first time it was brown, then 2 times it was orange. I am overheating and sweating. T=97.8 forehead. I have a lot of mucous in the back of my throat, and don't know if I have pneumonia (no previous hx). (She says she is not coughing, no difficulty breathing noted).  Should I come in?  I last vomited @ 6am. I had been drinking Diet Pepsi. The vomit was a very light brown. I also drank Orange Sunkist\". She has both eaten and drank since she last vomited and kept it down.   \"I was told the next time I came in I would be assigned a guardianship because of how often I came in to the hospital\". She wants to know if this will happen ?   Patient states she took Pepto Bismol which helped with the nausea. \"I am burning up, I am on fire. I have been having chest pain and my calves. My whole body is in pain.   I don't want to go to the hospital the roads are really bad now. I called my clinic and  I have an appointment over the phone @ 2:40pm tomorrow.   I am in a group home and I told the staff know my symptoms.\"   Patient states she already called her clinic (and discussed her sx listed above.)  Advised to follow her clinic's recommendation. If sx worsen then she either will call us back, or go to the ER.    Nya Banegas RN Triage Nurse Advisor 4:58 PM 1/5/2022  "

## 2022-01-07 ENCOUNTER — HOSPITAL ENCOUNTER (EMERGENCY)
Facility: CLINIC | Age: 22
Discharge: HOME | End: 2022-01-07
Attending: EMERGENCY MEDICINE | Admitting: EMERGENCY MEDICINE
Payer: MEDICARE

## 2022-01-07 VITALS
HEART RATE: 83 BPM | TEMPERATURE: 98.7 F | SYSTOLIC BLOOD PRESSURE: 127 MMHG | OXYGEN SATURATION: 99 % | DIASTOLIC BLOOD PRESSURE: 82 MMHG

## 2022-01-07 DIAGNOSIS — F60.3 BORDERLINE PERSONALITY DISORDER (H): ICD-10-CM

## 2022-01-07 LAB
ALBUMIN SERPL-MCNC: 3.5 G/DL (ref 3.4–5)
ALBUMIN UR-MCNC: NEGATIVE MG/DL
ALP SERPL-CCNC: 56 U/L (ref 40–150)
ALT SERPL W P-5'-P-CCNC: 22 U/L (ref 0–50)
AMORPH CRY #/AREA URNS HPF: ABNORMAL /HPF
ANION GAP SERPL CALCULATED.3IONS-SCNC: 2 MMOL/L (ref 3–14)
APPEARANCE UR: ABNORMAL
AST SERPL W P-5'-P-CCNC: 12 U/L (ref 0–45)
BACTERIA #/AREA URNS HPF: ABNORMAL /HPF
BASOPHILS # BLD AUTO: 0.1 10E3/UL (ref 0–0.2)
BASOPHILS NFR BLD AUTO: 1 %
BILIRUB SERPL-MCNC: 0.1 MG/DL (ref 0.2–1.3)
BILIRUB UR QL STRIP: NEGATIVE
BUN SERPL-MCNC: 12 MG/DL (ref 7–30)
CALCIUM SERPL-MCNC: 8.9 MG/DL (ref 8.5–10.1)
CHLORIDE BLD-SCNC: 111 MMOL/L (ref 94–109)
CO2 SERPL-SCNC: 27 MMOL/L (ref 20–32)
COLOR UR AUTO: ABNORMAL
CREAT SERPL-MCNC: 0.74 MG/DL (ref 0.52–1.04)
EOSINOPHIL # BLD AUTO: 0.2 10E3/UL (ref 0–0.7)
EOSINOPHIL NFR BLD AUTO: 2 %
ERYTHROCYTE [DISTWIDTH] IN BLOOD BY AUTOMATED COUNT: 13.3 % (ref 10–15)
GFR SERPL CREATININE-BSD FRML MDRD: >90 ML/MIN/1.73M2
GLUCOSE BLD-MCNC: 109 MG/DL (ref 70–99)
GLUCOSE UR STRIP-MCNC: NEGATIVE MG/DL
HCG UR QL: NEGATIVE
HCT VFR BLD AUTO: 38.7 % (ref 35–47)
HGB BLD-MCNC: 12.7 G/DL (ref 11.7–15.7)
HGB UR QL STRIP: NEGATIVE
IMM GRANULOCYTES # BLD: 0.1 10E3/UL
IMM GRANULOCYTES NFR BLD: 1 %
KETONES UR STRIP-MCNC: NEGATIVE MG/DL
LEUKOCYTE ESTERASE UR QL STRIP: ABNORMAL
LIPASE SERPL-CCNC: 176 U/L (ref 73–393)
LYMPHOCYTES # BLD AUTO: 2.3 10E3/UL (ref 0.8–5.3)
LYMPHOCYTES NFR BLD AUTO: 21 %
MCH RBC QN AUTO: 28 PG (ref 26.5–33)
MCHC RBC AUTO-ENTMCNC: 32.8 G/DL (ref 31.5–36.5)
MCV RBC AUTO: 85 FL (ref 78–100)
MONOCYTES # BLD AUTO: 0.5 10E3/UL (ref 0–1.3)
MONOCYTES NFR BLD AUTO: 5 %
MUCOUS THREADS #/AREA URNS LPF: PRESENT /LPF
NEUTROPHILS # BLD AUTO: 7.9 10E3/UL (ref 1.6–8.3)
NEUTROPHILS NFR BLD AUTO: 70 %
NITRATE UR QL: NEGATIVE
NRBC # BLD AUTO: 0 10E3/UL
NRBC BLD AUTO-RTO: 0 /100
PH UR STRIP: 5 [PH] (ref 5–7)
PLATELET # BLD AUTO: 263 10E3/UL (ref 150–450)
POTASSIUM BLD-SCNC: 4.6 MMOL/L (ref 3.4–5.3)
PROT SERPL-MCNC: 7.7 G/DL (ref 6.8–8.8)
RBC # BLD AUTO: 4.54 10E6/UL (ref 3.8–5.2)
RBC URINE: 5 /HPF
SODIUM SERPL-SCNC: 140 MMOL/L (ref 133–144)
SP GR UR STRIP: 1.01 (ref 1–1.03)
SQUAMOUS EPITHELIAL: 19 /HPF
UROBILINOGEN UR STRIP-MCNC: NORMAL MG/DL
WBC # BLD AUTO: 11 10E3/UL (ref 4–11)
WBC URINE: 7 /HPF

## 2022-01-07 PROCEDURE — 96361 HYDRATE IV INFUSION ADD-ON: CPT | Performed by: EMERGENCY MEDICINE

## 2022-01-07 PROCEDURE — 81001 URINALYSIS AUTO W/SCOPE: CPT | Performed by: EMERGENCY MEDICINE

## 2022-01-07 PROCEDURE — 81025 URINE PREGNANCY TEST: CPT | Performed by: EMERGENCY MEDICINE

## 2022-01-07 PROCEDURE — 99285 EMERGENCY DEPT VISIT HI MDM: CPT | Mod: 25 | Performed by: EMERGENCY MEDICINE

## 2022-01-07 PROCEDURE — 82310 ASSAY OF CALCIUM: CPT | Performed by: EMERGENCY MEDICINE

## 2022-01-07 PROCEDURE — 83690 ASSAY OF LIPASE: CPT | Performed by: EMERGENCY MEDICINE

## 2022-01-07 PROCEDURE — 99284 EMERGENCY DEPT VISIT MOD MDM: CPT | Performed by: EMERGENCY MEDICINE

## 2022-01-07 PROCEDURE — 96360 HYDRATION IV INFUSION INIT: CPT | Performed by: EMERGENCY MEDICINE

## 2022-01-07 PROCEDURE — 87086 URINE CULTURE/COLONY COUNT: CPT | Performed by: EMERGENCY MEDICINE

## 2022-01-07 PROCEDURE — 258N000003 HC RX IP 258 OP 636: Performed by: EMERGENCY MEDICINE

## 2022-01-07 PROCEDURE — 36415 COLL VENOUS BLD VENIPUNCTURE: CPT | Performed by: EMERGENCY MEDICINE

## 2022-01-07 PROCEDURE — 85004 AUTOMATED DIFF WBC COUNT: CPT | Performed by: EMERGENCY MEDICINE

## 2022-01-07 PROCEDURE — 90791 PSYCH DIAGNOSTIC EVALUATION: CPT

## 2022-01-07 RX ORDER — SODIUM CHLORIDE 9 MG/ML
INJECTION, SOLUTION INTRAVENOUS CONTINUOUS
Status: DISCONTINUED | OUTPATIENT
Start: 2022-01-07 | End: 2022-01-07 | Stop reason: HOSPADM

## 2022-01-07 RX ADMIN — SODIUM CHLORIDE 125 ML/HR: 9 INJECTION, SOLUTION INTRAVENOUS at 13:03

## 2022-01-07 RX ADMIN — SODIUM CHLORIDE 1000 ML: 9 INJECTION, SOLUTION INTRAVENOUS at 09:52

## 2022-01-07 RX ADMIN — SODIUM CHLORIDE 1000 ML: 9 INJECTION, SOLUTION INTRAVENOUS at 11:56

## 2022-01-07 ASSESSMENT — ENCOUNTER SYMPTOMS
DECREASED CONCENTRATION: 1
APPETITE CHANGE: 1
FATIGUE: 1
MUSCULOSKELETAL NEGATIVE: 1
GASTROINTESTINAL NEGATIVE: 1
DYSPHORIC MOOD: 1
RESPIRATORY NEGATIVE: 1
CARDIOVASCULAR NEGATIVE: 1

## 2022-01-07 NOTE — ED NOTES
1/7/2022  Sadaf Ross 2000     [facility] Crisis Assessment    Patient was assessed: in person  Patient location: [facility] ED    Referral Data and Chief Complaint  Pt is a 21 year old who uses she/her pronouns. Patient presented to the ED via EMS and was referred to the ED by self. Patient is presenting to the ED for the following concerns: vomiting and SI.      Informed Consent and Assessment Methods    Patient is her own guardian. Writer met with patient and explained the crisis assessment process, including applicable information disclosures and limits to confidentiality, assessed understanding of the process, and obtained consent to proceed with the assessment. Patient was observed to be able to participate in the assessment as evidenced by pt participation. Assessment methods included conducting a formal interview with patient, review of medical records, collaboration with medical staff, and obtaining relevant collateral information from family and community providers when available.    Narrative Summary of Presenting Problem and Current Functioning  What led to the patient presenting for crisis services, factors that make the crisis life threatening or complex, stressors, how is this disrupting the patient's life, and how current functioning is in comparison to baseline. How is patient presenting during the assessment.     Pt was brought to the ED by EMS due to vomiting and nausea.  Pt also told ED staff that she was feeling suicidal and does not think she can be left alone.  During assessment pt was calm and cooperative.  She stated that her nausea and stomach issues have resolved and that she wasn't having any SI.  Pt denied SI/HI/SIB and is recommended for discharge.    History of the Crisis  Duration of the current crisis, coping skills attempted to reduce the crisis, community resources used, and past presentations.    Pt has long hx of SI as her baseline.  She reports feeling depressed due to the loss  of her father one year ago this Feb.  Pt was last reported to be living alone in her group home due to not getting alone with others and requiring 1 to 1 staff. Pt appears at her baseline at this time.     Collateral Information    Collateral info from [facility] home supervisior: Arlene at [phone].  She states that pt has been doing well, taking her medications and this week has made stmts that she hasn't been to the hospital in awhile and felt that she needed to go.  She then started complaining of different ailments of stomach pain, dizziness etc. And then called EMS today due to stomach issues.  Staff does not have any safety concerns and pt is allowed to come back.      Risk Assessment    Risk of Harm to Self     ESS-6  1.a. Over the past 2 weeks, have you had thoughts of killing yourself? Yes  1.b. Have you ever attempted to kill yourself and, if yes, when did this last happen? No   2. Recent or current suicide plan? No   3. Recent or current intent to act on ideation? No  4. Lifetime psychiatric hospitalization? Yes  5. Pattern of excessive substance use? No  6. Current irritability, agitation, or aggression? No  Scoring note: BOTH 1a and 1b must be yes for it to score 1 point, if both are not yes it is zero. All others are 1 point per number. If all questions 1a/1b - 6 are no, risk is negligible. If one of 1a/1b is yes, then risk is mild. If either question 2 or 3, but not both, is yes, then risk is automatically moderate regardless of total score. If both 2 and 3 are yes, risk is automatically high regardless of total score.     Score: 1    The patient has the following risk factors for suicide: depressive symptoms, poor decision making and recent loss    Is the patient experiencing current suicidal ideation: No    Is the patient engaging in preparatory suicide behaviors (formulating how to act on plan, giving away possessions, saying goodbye, displaying dramatic behavior changes, etc)? No    Does the patient  have access to firearms or other lethal means? no    The patient has the following protective factors: voluntarily seeking mental health support, established relationship community mental health provider(s) and other: resides at [facility] home    Support system information: has established  and group home staff    Patient strengths:     Does the patient engage in non-suicidal self-injurious behavior (NSSI/SIB)? no    Is the patient vulnerable to sexual exploitation?  No    Is the patient experiencing abuse or neglect? no    Is the patient a vulnerable adult? No      Risk of Harm to Others  The patient has the following risk factors of harm to others: no risk factors identified    Does the patient have thoughts of harming others? No    Is the patient engaging in sexually inappropriate behavior?  no       Current Substance Abuse    Is there recent substance abuse? no    Was a urine drug screen or blood alcohol level obtained: No      Current Symptoms/Concerns    Symptoms  Attention, hyperactivity, and impulsivity symptoms present: No    Anxiety symptoms present: No      Appetite symptoms present: No     Behavioral difficulties present: No     Cognitive impairment symptoms present: Yes: Decision-Making and Judgment/Insight    Depressive symptoms present: Yes Thoughts of suicide/death      Eating disorder symptoms present: No    Learning disabilities, cognitive challenges, and/or developmental disorder symptoms present: Yes: Functional Impairments     Manic/hypomanic symptoms present: No    Personality and interpersonal functioning difficulties present : Yes: Impaired Impulse Control and Impaired Interpersonal Functioning    Psychosis symptoms present: No      Sleep difficulties present: No    Substance abuse disorder symptoms present: No     Trauma and stressor related symptoms present: No           Mental Status Exam   Affect: Appropriate   Appearance: Appropriate    Attention Span/Concentration: Attentive?     Eye Contact: Engaged   Fund of Knowledge: Appropriate    Language /Speech Content: Fluent   Language /Speech Volume: Normal    Language /Speech Rate/Productions: Normal    Recent Memory: Intact   Remote Memory: Intact   Mood: Normal    Orientation to Person: Yes    Orientation to Place: Yes   Orientation to Time of Day: Yes    Orientation to Date: Yes    Situation (Do they understand why they are here?): Yes    Psychomotor Behavior: Normal    Thought Content: Clear   Thought Form: Intact       Mental Health and Substance Abuse History    History  Current and historical diagnoses or mental health concerns: Pt has hx of BPD, Bipolar, BPD, Intellectual disability, moderate, MDD and PTSD    Prior MH services (inpatient, programmatic care, outpatient, etc) : Yes significant ED visits for SI    History of substance abuse: No    Prior LIZZETTE services (inpatient, programmatic care, detox, outpatient, etc) : No    History of commitment: No    Family history of MH/LIZZETTE: No    Trauma history: No    Medication  Psychotropic medications: Pt is on medications and has been compliant.     Current Care Team  Primary Care Provider: Yes, [facility] ([phone])     Psychiatrist: Yes, Treva Galindo and [facility] ([phone])     Therapist: Yes, Treva Albright and [facility] ([phone])     : Yes, Jyoti Malik, [facility] ([phone])     CTSS or ARMHS: No     ACT Team: No    Release of Information  Was a release of information signed: Yes      Biopsychosocial Information    Socioeconomic Information  Current living situation: PT resides at [facility] home: [facility]     Employment/income source: none    Relevant legal issues: none    Cultural, Mu-ism, or spiritual influences on mental health care: none    Is the patient active in the  or a : No      Relevant Medical Concerns   Patient identifies concerns with completing ADLs? No      Patient can ambulate independently? Yes     Other medical concerns? No     History of concussion or TBI? No        Diagnosis    F71 Intellectual disability, moderate  F60.3 Borderline personality disorder   F33.1 Major depressive disorder, recurrent, moderate   F43.12 Post-traumatic stress disorder, chronic   F31.9 Bipolar I Disorder, current or most recent episode depressed, unspecified    Therapeutic Intervention  The following therapeutic methodologies were employed when working with the patient: establishing rapport, active listening, assessing dimensions of crisis, identifying additional supports and alternative coping skills, establishing a discharge plan and safety planning. Patient response to intervention: positive.      Disposition  Recommended disposition: Group Home: [facility] [address] [place], [place]      Reviewed case and recommendations with attending provider. Attending Name: Dr. Cinthia MD        Attending concurs with disposition: Yes      Patient concurs with disposition: Yes      Guardian concurs with disposition: NA     Final disposition: Group home: [facility] .       Clinical Substantiation of Recommendations   Rationale with supporting factors for disposition and diagnosis.     Pt has long hx of ED visits due to baseline SI. Pt made stmts this week that it had been a long time since she had been in the hospital and that she needed to go.  Pt denies SI/HI/SIB at this time.        Assessment Details  Patient interview started at: 4:20 PM and completed at: 4:50 PM.    Total duration spent on the patient case in minutes: .50 hrs     CPT code(s) utilized: 78082 - Psychotherapy for Crisis - 60 (30-74*) min       Aftercare and Safety Planning  Follow up plans with MH/LIZZETTE services: No      Aftercare plan placed in the AVS and provided to patient: Yes. Given to patient by ED staff    Yarely Cruz      Aftercare Plan  If I am feeling unsafe or I am in a crisis, I  "will:   Contact my established care providers   Call the National Suicide Prevention Lifeline: 111.328.2885   Go to the nearest emergency room   Call 911     Warning signs that I or other people might notice when a crisis is developing for me: feelings of wanting to die that include a plan or intent    Things I am able to do on my own to cope or help me feel better: Listen to music, journal, meditate.     Things that I am able to do with others to cope or help me better: talk with friends and family      Things I can use or do for distraction: watch tv, listen to music,  meditate     Changes I can make to support my mental health and wellness:      People in my life that I can ask for help: staff and     Your [facility] has a mental health crisis team you can call 24/7: [facility]  [phone] (adults)  [phone] (children)      Crisis Lines  Crisis Text Line  Text 554063  You will be connected with a trained live crisis counselor to provide support.    National Hope Line  1.800.SUICIDE [5043082]      Community Resources  Fast Tracker  Linking people to mental health and substance use disorder resources  CAN CapitalckExaqtWorldn.org     [place] Mental [facility] Warm Line  Peer to peer support  Monday thru Saturday, 12 pm to 10 pm  744.654.5905 or 7.438.905.9202  Text \"Support\" to 71420    National [place] on Mental Illness (MAGY)  534.608.9102 or 1.888.MAGY.HELPS        Mental Health Apps  My3  https://my3app.org/    VirtualHopeBox  https://OnMyBlock.org/apps/virtual-hope-box/          "

## 2022-01-07 NOTE — ED PROVIDER NOTES
This is aED Provider Note  [facility]      History     Chief Complaint   Patient presents with     Nausea & Vomiting     Suicidal     HPI  Sadaf Ross is a 21 year old female who has a history of borderline personality, intellectual disability, suicidal ideation, bipolar affective disorder.  She presents now complaining of 2 episodes of vomiting associated with abdominal pain that started last night.  She also reports that she is feeling suicidal and does not think she can be left alone in the room and acknowledges she should have a one-to-one.  She does not elaborate on either the nausea vomiting abdominal pain or the suicidal ideation.    Past Medical History  Past Medical History:   Diagnosis Date     ADHD (attention deficit hyperactivity disorder)      Bipolar 1 disorder (H)      Borderline personality disorder (H)      Depression      Depressive disorder      Intellectual disability      Obesity      Syncope      Past Surgical History:   Procedure Laterality Date     APPENDECTOMY       APPENDECTOMY       ARIPiprazole ER (ABILIFY MAINTENA) 400 MG extended release inj syringe  benztropine (COGENTIN) 0.5 MG tablet  calcium carbonate (TUMS) 500 MG chewable tablet  chlorhexidine (CHLORHEXIDINE) 0.12 % solution  docusate sodium (COLACE) 100 MG capsule  hydrOXYzine (ATARAX) 50 MG tablet  metoclopramide (REGLAN) 10 MG tablet  naltrexone (DEPADE/REVIA) 50 MG tablet  norgestimate-ethinyl estradiol (SPRINTEC 28) 0.25-35 MG-MCG tablet  OLANZapine zydis (ZYPREXA) 5 MG ODT  omeprazole (PRILOSEC) 40 MG DR capsule  ondansetron (ZOFRAN) 4 MG tablet  polyethylene glycol (MIRALAX) 17 g packet  Vitamin D, Cholecalciferol, 25 MCG (1000 UT) TABS      Allergies   Allergen Reactions     Penicillins Rash and Unknown     Family History  Family History   Problem Relation Age of Onset     Diabetes Type 1 Father      Cancer Paternal Grandfather      Social History   Social History     Tobacco Use      Smoking status: Current Some Day Smoker     Packs/day: 1.00     Years: 5.00     Pack years: 5.00     Types: Cigarettes     Smokeless tobacco: Never Used   Substance Use Topics     Alcohol use: No     Drug use: No      Past medical history, past surgical history, medications, allergies, family history, and social history were reviewed with the patient. No additional pertinent items.       Review of Systems   Constitutional: Positive for appetite change and fatigue.   HENT: Negative.    Respiratory: Negative.    Cardiovascular: Negative.    Gastrointestinal: Negative.    Genitourinary: Negative.    Musculoskeletal: Negative.    Psychiatric/Behavioral: Positive for decreased concentration and dysphoric mood. Negative for self-injury and suicidal ideas.   All other systems reviewed and are negative.    A complete review of systems was performed with pertinent positives and negatives noted in the HPI, and all other systems negative.    Physical Exam   BP: 121/71  Pulse: 77  Temp: 98  F (36.7  C)  SpO2: 100 %  Physical Exam  Vitals and nursing note reviewed.   Constitutional:       General: She is not in acute distress.     Appearance: She is well-developed.   HENT:      Head: Normocephalic and atraumatic.      Mouth/Throat:      Mouth: Mucous membranes are moist.   Eyes:      General: No scleral icterus.     Conjunctiva/sclera: Conjunctivae normal.      Pupils: Pupils are equal, round, and reactive to light.   Cardiovascular:      Rate and Rhythm: Normal rate and regular rhythm.      Heart sounds: Normal heart sounds.   Pulmonary:      Effort: Pulmonary effort is normal. No respiratory distress.      Breath sounds: Normal breath sounds. No wheezing.   Abdominal:      General: Abdomen is flat.      Palpations: Abdomen is soft.      Comments: Obese soft nontender   Musculoskeletal:      Cervical back: Neck supple.   Skin:     General: Skin is warm and dry.   Neurological:      General: No focal deficit present.      Mental  Status: She is alert and oriented to person, place, and time.      Cranial Nerves: No cranial nerve deficit.   Psychiatric:         Attention and Perception: She is inattentive.         Mood and Affect: Mood is depressed.         Speech: Speech is delayed.         Behavior: Behavior is slowed and withdrawn.         Thought Content: Thought content does not include suicidal ideation. Thought content does not include suicidal plan.         Cognition and Memory: Cognition and memory normal.         Judgment: Judgment is impulsive and inappropriate.         ED Course      Procedures       Seen by BEC       Results for orders placed or performed during the hospital encounter of 01/07/22   Comprehensive metabolic panel     Status: Abnormal   Result Value Ref Range    Sodium 140 133 - 144 mmol/L    Potassium 4.6 3.4 - 5.3 mmol/L    Chloride 111 (H) 94 - 109 mmol/L    Carbon Dioxide (CO2) 27 20 - 32 mmol/L    Anion Gap 2 (L) 3 - 14 mmol/L    Urea Nitrogen 12 7 - 30 mg/dL    Creatinine 0.74 0.52 - 1.04 mg/dL    Calcium 8.9 8.5 - 10.1 mg/dL    Glucose 109 (H) 70 - 99 mg/dL    Alkaline Phosphatase 56 40 - 150 U/L    AST 12 0 - 45 U/L    ALT 22 0 - 50 U/L    Protein Total 7.7 6.8 - 8.8 g/dL    Albumin 3.5 3.4 - 5.0 g/dL    Bilirubin Total 0.1 (L) 0.2 - 1.3 mg/dL    GFR Estimate >90 >60 mL/min/1.73m2   Lipase     Status: Normal   Result Value Ref Range    Lipase 176 73 - 393 U/L   UA with Microscopic reflex to Culture     Status: Abnormal    Specimen: Urine, Clean Catch   Result Value Ref Range    Color Urine Light Yellow Colorless, Straw, Light Yellow, Yellow    Appearance Urine Slightly Cloudy (A) Clear    Glucose Urine Negative Negative mg/dL    Bilirubin Urine Negative Negative    Ketones Urine Negative Negative mg/dL    Specific Gravity Urine 1.009 1.003 - 1.035    Blood Urine Negative Negative    pH Urine 5.0 5.0 - 7.0    Protein Albumin Urine Negative Negative mg/dL    Urobilinogen Urine Normal Normal, 2.0 mg/dL     Nitrite Urine Negative Negative    Leukocyte Esterase Urine Large (A) Negative    Bacteria Urine Few (A) None Seen /HPF    Mucus Urine Present (A) None Seen /LPF    Amorphous Crystals Urine Few (A) None Seen /HPF    RBC Urine 5 (H) <=2 /HPF    WBC Urine 7 (H) <=5 /HPF    Squamous Epithelials Urine 19 (H) <=1 /HPF    Narrative    Urine Culture ordered based on laboratory criteria   HCG qualitative urine     Status: Normal   Result Value Ref Range    hCG Urine Qualitative Negative Negative   CBC with platelets and differential     Status: None   Result Value Ref Range    WBC Count 11.0 4.0 - 11.0 10e3/uL    RBC Count 4.54 3.80 - 5.20 10e6/uL    Hemoglobin 12.7 11.7 - 15.7 g/dL    Hematocrit 38.7 35.0 - 47.0 %    MCV 85 78 - 100 fL    MCH 28.0 26.5 - 33.0 pg    MCHC 32.8 31.5 - 36.5 g/dL    RDW 13.3 10.0 - 15.0 %    Platelet Count 263 150 - 450 10e3/uL    % Neutrophils 70 %    % Lymphocytes 21 %    % Monocytes 5 %    % Eosinophils 2 %    % Basophils 1 %    % Immature Granulocytes 1 %    NRBCs per 100 WBC 0 <1 /100    Absolute Neutrophils 7.9 1.6 - 8.3 10e3/uL    Absolute Lymphocytes 2.3 0.8 - 5.3 10e3/uL    Absolute Monocytes 0.5 0.0 - 1.3 10e3/uL    Absolute Eosinophils 0.2 0.0 - 0.7 10e3/uL    Absolute Basophils 0.1 0.0 - 0.2 10e3/uL    Absolute Immature Granulocytes 0.1 <=0.4 10e3/uL    Absolute NRBCs 0.0 10e3/uL   CBC with platelets differential     Status: None    Narrative    The following orders were created for panel order CBC with platelets differential.  Procedure                               Abnormality         Status                     ---------                               -----------         ------                     CBC with platelets and d...[[id number]]                      Final result                 Please view results for these tests on the individual orders.     Medications   0.9% sodium chloride BOLUS (0 mLs Intravenous Stopped 1/7/22 1052)     Followed by   0.9% sodium chloride BOLUS (0 mLs  Intravenous Stopped 1/7/22 1307)     Followed by   sodium chloride 0.9% infusion ( Intravenous Not Given 1/7/22 1415)        Assessments & Plan (with Medical Decision Making)   Patient was seen by back who feel she can be discharged home they spoke to her group home pattern for her.  Her labs are unremarkable she is not suicidal.  They will present discharge instructions for her.  There is no indication for hospitalization and she has no medical issues.    I have reviewed the nursing notes. I have reviewed the findings, diagnosis, plan and need for follow up with the patient.    New Prescriptions    No medications on file       Final diagnoses:   Borderline personality disorder (H)       --  Saul Vicente MD  [facility] EMERGENCY DEPARTMENT  1/7/2022     Saul Vicente MD  01/07/22 6349

## 2022-01-07 NOTE — ED NOTES
TT :  Arlene: [phone]    Pt can return to group home via cab at address:  [address], [place], [place]

## 2022-01-07 NOTE — DISCHARGE INSTRUCTIONS
"Continue with your current medications and therapy  Your lab tests were normal    Aftercare Plan  If I am feeling unsafe or I am in a crisis, I will:   Contact my established care providers   Call the National Suicide Prevention Lifeline: 112.666.7133   Go to the nearest emergency room   Call 911     Warning signs that I or other people might notice when a crisis is developing for me: feelings of wanting to die that include a plan or intent    Things I am able to do on my own to cope or help me feel better: Listen to music, journal, meditate.     Things that I am able to do with others to cope or help me better: talk with friends and family      Things I can use or do for distraction: watch tv, listen to music,  meditate     Changes I can make to support my mental health and wellness:      People in my life that I can ask for help: staff and     Your Critical access hospital has a mental health crisis team you can call 24/7: [facility] Mobile Crisis  [phone] (adults)  [phone] (children)      Crisis Lines  Crisis Text Line  Text 516609  You will be connected with a trained live crisis counselor to provide support.    National Hope Line  1.800.SUICIDE [9813487]      Community Resources  Fast Tracker  Linking people to mental health and substance use disorder resources  fasttrackMingleplayn.org     [place] Mental Health Warm Line  Peer to peer support  Monday thru Saturday, 12 pm to 10 pm  137.402.8333 or 2.555.012.9291  Text \"Support\" to 26352    National Trent on Mental Illness (MAGY)  903.794.4665 or 1.888.MAGY.HELPS        Mental Health Apps  My3  https://my3app.org/    VirtualHopeBox  https://Fusemachines.org/apps/virtual-hope-box/  "

## 2022-01-07 NOTE — ED NOTES
Bed: ED18  Expected date: 1/7/22  Expected time: 7:47 AM  Means of arrival: Ambulance  Comments:  [address] 22yo F vomiting

## 2022-01-08 ENCOUNTER — HOSPITAL ENCOUNTER (EMERGENCY)
Facility: CLINIC | Age: 22
Discharge: NURSING, REHAB OR LONG-TERM CARE | End: 2022-01-08
Attending: FAMILY MEDICINE | Admitting: FAMILY MEDICINE
Payer: MEDICARE

## 2022-01-08 ENCOUNTER — APPOINTMENT (OUTPATIENT)
Dept: CT IMAGING | Facility: CLINIC | Age: 22
End: 2022-01-08
Attending: FAMILY MEDICINE
Payer: MEDICARE

## 2022-01-08 ENCOUNTER — HOSPITAL ENCOUNTER (EMERGENCY)
Facility: CLINIC | Age: 22
Discharge: NURSING, REHAB OR LONG-TERM CARE | End: 2022-01-08
Attending: FAMILY MEDICINE
Payer: MEDICARE

## 2022-01-08 VITALS
WEIGHT: 218 LBS | SYSTOLIC BLOOD PRESSURE: 139 MMHG | DIASTOLIC BLOOD PRESSURE: 95 MMHG | BODY MASS INDEX: 37.22 KG/M2 | HEIGHT: 64 IN | OXYGEN SATURATION: 99 % | HEART RATE: 80 BPM | TEMPERATURE: 98.2 F | RESPIRATION RATE: 16 BRPM

## 2022-01-08 VITALS
SYSTOLIC BLOOD PRESSURE: 122 MMHG | DIASTOLIC BLOOD PRESSURE: 74 MMHG | HEART RATE: 84 BPM | OXYGEN SATURATION: 95 % | TEMPERATURE: 97.7 F | RESPIRATION RATE: 18 BRPM

## 2022-01-08 DIAGNOSIS — F60.3 BORDERLINE PERSONALITY DISORDER (H): Chronic | ICD-10-CM

## 2022-01-08 DIAGNOSIS — F79 INTELLECTUAL DISABILITY: Chronic | ICD-10-CM

## 2022-01-08 DIAGNOSIS — R11.2 NON-INTRACTABLE VOMITING WITH NAUSEA, UNSPECIFIED VOMITING TYPE: ICD-10-CM

## 2022-01-08 DIAGNOSIS — R11.0 NAUSEA: ICD-10-CM

## 2022-01-08 LAB
ALBUMIN SERPL-MCNC: 4 G/DL (ref 3.4–5)
ALBUMIN UR-MCNC: NEGATIVE MG/DL
ALP SERPL-CCNC: 60 U/L (ref 40–150)
ALT SERPL W P-5'-P-CCNC: 25 U/L (ref 0–50)
ANION GAP SERPL CALCULATED.3IONS-SCNC: 4 MMOL/L (ref 3–14)
APPEARANCE UR: CLEAR
AST SERPL W P-5'-P-CCNC: 18 U/L (ref 0–45)
BACTERIA #/AREA URNS HPF: ABNORMAL /HPF
BACTERIA UR CULT: NORMAL
BASOPHILS # BLD AUTO: 0 10E3/UL (ref 0–0.2)
BASOPHILS NFR BLD AUTO: 1 %
BILIRUB SERPL-MCNC: 0.2 MG/DL (ref 0.2–1.3)
BILIRUB UR QL STRIP: NEGATIVE
BUN SERPL-MCNC: 10 MG/DL (ref 7–30)
CALCIUM SERPL-MCNC: 9.5 MG/DL (ref 8.5–10.1)
CHLORIDE BLD-SCNC: 110 MMOL/L (ref 94–109)
CO2 SERPL-SCNC: 27 MMOL/L (ref 20–32)
COLOR UR AUTO: ABNORMAL
CREAT SERPL-MCNC: 0.74 MG/DL (ref 0.52–1.04)
EOSINOPHIL # BLD AUTO: 0.1 10E3/UL (ref 0–0.7)
EOSINOPHIL NFR BLD AUTO: 1 %
ERYTHROCYTE [DISTWIDTH] IN BLOOD BY AUTOMATED COUNT: 13.2 % (ref 10–15)
GFR SERPL CREATININE-BSD FRML MDRD: >90 ML/MIN/1.73M2
GLUCOSE BLD-MCNC: 127 MG/DL (ref 70–99)
GLUCOSE UR STRIP-MCNC: NEGATIVE MG/DL
HCG UR QL: NEGATIVE
HCT VFR BLD AUTO: 39.5 % (ref 35–47)
HGB BLD-MCNC: 13.1 G/DL (ref 11.7–15.7)
HGB UR QL STRIP: NEGATIVE
IMM GRANULOCYTES # BLD: 0 10E3/UL
IMM GRANULOCYTES NFR BLD: 1 %
INTERNAL QC OK POCT: NORMAL
KETONES UR STRIP-MCNC: NEGATIVE MG/DL
LACTATE SERPL-SCNC: 1 MMOL/L (ref 0.7–2)
LEUKOCYTE ESTERASE UR QL STRIP: NEGATIVE
LIPASE SERPL-CCNC: 200 U/L (ref 73–393)
LYMPHOCYTES # BLD AUTO: 1.9 10E3/UL (ref 0.8–5.3)
LYMPHOCYTES NFR BLD AUTO: 22 %
MAGNESIUM SERPL-MCNC: 2.3 MG/DL (ref 1.6–2.3)
MCH RBC QN AUTO: 28.1 PG (ref 26.5–33)
MCHC RBC AUTO-ENTMCNC: 33.2 G/DL (ref 31.5–36.5)
MCV RBC AUTO: 85 FL (ref 78–100)
MONOCYTES # BLD AUTO: 0.3 10E3/UL (ref 0–1.3)
MONOCYTES NFR BLD AUTO: 3 %
MUCOUS THREADS #/AREA URNS LPF: PRESENT /LPF
NEUTROPHILS # BLD AUTO: 6.4 10E3/UL (ref 1.6–8.3)
NEUTROPHILS NFR BLD AUTO: 72 %
NITRATE UR QL: NEGATIVE
NRBC # BLD AUTO: 0 10E3/UL
NRBC BLD AUTO-RTO: 0 /100
PH UR STRIP: 6 [PH] (ref 5–7)
PLATELET # BLD AUTO: 278 10E3/UL (ref 150–450)
POCT KIT EXPIRATION DATE: NORMAL
POCT KIT LOT NUMBER: NORMAL
POTASSIUM BLD-SCNC: 4 MMOL/L (ref 3.4–5.3)
PROT SERPL-MCNC: 8.4 G/DL (ref 6.8–8.8)
RBC # BLD AUTO: 4.66 10E6/UL (ref 3.8–5.2)
RBC URINE: 1 /HPF
SARS-COV-2 RNA RESP QL NAA+PROBE: NEGATIVE
SODIUM SERPL-SCNC: 141 MMOL/L (ref 133–144)
SP GR UR STRIP: 1 (ref 1–1.03)
SQUAMOUS EPITHELIAL: 2 /HPF
UROBILINOGEN UR STRIP-MCNC: NORMAL MG/DL
WBC # BLD AUTO: 8.8 10E3/UL (ref 4–11)
WBC URINE: 1 /HPF

## 2022-01-08 PROCEDURE — 99285 EMERGENCY DEPT VISIT HI MDM: CPT | Mod: 25 | Performed by: FAMILY MEDICINE

## 2022-01-08 PROCEDURE — 74177 CT ABD & PELVIS W/CONTRAST: CPT

## 2022-01-08 PROCEDURE — 81025 URINE PREGNANCY TEST: CPT | Performed by: FAMILY MEDICINE

## 2022-01-08 PROCEDURE — 93010 ELECTROCARDIOGRAM REPORT: CPT | Performed by: FAMILY MEDICINE

## 2022-01-08 PROCEDURE — 81003 URINALYSIS AUTO W/O SCOPE: CPT | Performed by: FAMILY MEDICINE

## 2022-01-08 PROCEDURE — 99285 EMERGENCY DEPT VISIT HI MDM: CPT | Mod: 25,27 | Performed by: FAMILY MEDICINE

## 2022-01-08 PROCEDURE — 96375 TX/PRO/DX INJ NEW DRUG ADDON: CPT | Performed by: FAMILY MEDICINE

## 2022-01-08 PROCEDURE — 96361 HYDRATE IV INFUSION ADD-ON: CPT | Performed by: FAMILY MEDICINE

## 2022-01-08 PROCEDURE — 82947 ASSAY GLUCOSE BLOOD QUANT: CPT | Performed by: FAMILY MEDICINE

## 2022-01-08 PROCEDURE — 83690 ASSAY OF LIPASE: CPT | Performed by: FAMILY MEDICINE

## 2022-01-08 PROCEDURE — 85014 HEMATOCRIT: CPT | Performed by: FAMILY MEDICINE

## 2022-01-08 PROCEDURE — U0003 INFECTIOUS AGENT DETECTION BY NUCLEIC ACID (DNA OR RNA); SEVERE ACUTE RESPIRATORY SYNDROME CORONAVIRUS 2 (SARS-COV-2) (CORONAVIRUS DISEASE [COVID-19]), AMPLIFIED PROBE TECHNIQUE, MAKING USE OF HIGH THROUGHPUT TECHNOLOGIES AS DESCRIBED BY CMS-2020-01-R: HCPCS | Performed by: FAMILY MEDICINE

## 2022-01-08 PROCEDURE — 93005 ELECTROCARDIOGRAM TRACING: CPT | Performed by: FAMILY MEDICINE

## 2022-01-08 PROCEDURE — 250N000009 HC RX 250: Performed by: FAMILY MEDICINE

## 2022-01-08 PROCEDURE — 90791 PSYCH DIAGNOSTIC EVALUATION: CPT

## 2022-01-08 PROCEDURE — 250N000011 HC RX IP 250 OP 636: Performed by: FAMILY MEDICINE

## 2022-01-08 PROCEDURE — 96374 THER/PROPH/DIAG INJ IV PUSH: CPT | Mod: 59 | Performed by: FAMILY MEDICINE

## 2022-01-08 PROCEDURE — 82374 ASSAY BLOOD CARBON DIOXIDE: CPT | Performed by: FAMILY MEDICINE

## 2022-01-08 PROCEDURE — 83735 ASSAY OF MAGNESIUM: CPT | Performed by: FAMILY MEDICINE

## 2022-01-08 PROCEDURE — C9803 HOPD COVID-19 SPEC COLLECT: HCPCS | Performed by: FAMILY MEDICINE

## 2022-01-08 PROCEDURE — 258N000003 HC RX IP 258 OP 636: Performed by: FAMILY MEDICINE

## 2022-01-08 PROCEDURE — 83605 ASSAY OF LACTIC ACID: CPT | Performed by: FAMILY MEDICINE

## 2022-01-08 PROCEDURE — 36415 COLL VENOUS BLD VENIPUNCTURE: CPT | Performed by: FAMILY MEDICINE

## 2022-01-08 RX ORDER — SODIUM CHLORIDE 9 MG/ML
INJECTION, SOLUTION INTRAVENOUS
Status: DISCONTINUED
Start: 2022-01-08 | End: 2022-01-08 | Stop reason: HOSPADM

## 2022-01-08 RX ORDER — ONDANSETRON 2 MG/ML
8 INJECTION INTRAMUSCULAR; INTRAVENOUS ONCE
Status: COMPLETED | OUTPATIENT
Start: 2022-01-08 | End: 2022-01-08

## 2022-01-08 RX ORDER — LORAZEPAM 2 MG/ML
0.5 INJECTION INTRAMUSCULAR ONCE
Status: COMPLETED | OUTPATIENT
Start: 2022-01-08 | End: 2022-01-08

## 2022-01-08 RX ORDER — METOCLOPRAMIDE 10 MG/1
10 TABLET ORAL 2 TIMES DAILY PRN
Qty: 6 TABLET | Refills: 0 | Status: SHIPPED | OUTPATIENT
Start: 2022-01-08 | End: 2022-09-19

## 2022-01-08 RX ORDER — IOPAMIDOL 755 MG/ML
125 INJECTION, SOLUTION INTRAVASCULAR ONCE
Status: COMPLETED | OUTPATIENT
Start: 2022-01-08 | End: 2022-01-08

## 2022-01-08 RX ADMIN — LORAZEPAM 0.5 MG: 2 INJECTION INTRAMUSCULAR; INTRAVENOUS at 11:15

## 2022-01-08 RX ADMIN — IOPAMIDOL 107 ML: 755 INJECTION, SOLUTION INTRAVENOUS at 12:51

## 2022-01-08 RX ADMIN — ONDANSETRON 8 MG: 2 INJECTION INTRAMUSCULAR; INTRAVENOUS at 11:15

## 2022-01-08 RX ADMIN — SODIUM CHLORIDE 67 ML: 9 INJECTION, SOLUTION INTRAVENOUS at 12:41

## 2022-01-08 RX ADMIN — SODIUM CHLORIDE 1000 ML: 9 INJECTION, SOLUTION INTRAVENOUS at 11:18

## 2022-01-08 ASSESSMENT — MIFFLIN-ST. JEOR: SCORE: 1733.84

## 2022-01-08 NOTE — ED PROVIDER NOTES
[facility] [place]    1/08/22    ED12    History     Chief Complaint   Patient presents with     Nausea & Vomiting     Pt states that she vomited x 6 and that she passed out     Suicidal     Pt states that she has suicidal thoughts. She states that this is chronic     The history is provided by the patient and medical records.     Sadaf Ross is a 21 year old female with a past medical history significant for bipolar 1 disorder, depressive disorder, and ADHD who resents to the ED for evaluation of nausea vomiting and suicidal ideation.  Patient reports that she has vomited 6-7 times in the past couple of days.  She last vomited this morning around 6 AM when she woke up.  She states that she has been unable to keep any food down.  Patient endorses constant diffuse abdominal pain and nothing makes it better or worse.  Patient states that she was seen yesterday for similar symptoms in the ED. she reports that when she arrived home after being discharged she felt lightheaded and fainted.  Patient states she lost consciousness but did not hurt herself.  Patient denies she has ever fainted in the past.  Patient denies diarrhea.  Patient's last bowel movement was yesterday and she states it was normal.  She reports urinary frequency but denies dysuria.  She states that she had an appendectomy when she was 11 years old, but denies any other abdominal surgeries.     Per chart review patient was seen yesterday at [facility] ED presenting with nausea vomiting.  Patient was subsequently worked up with labs which were unremarkable.  Patient was discharged home.    Past Medical History  Past Medical History:   Diagnosis Date     ADHD (attention deficit hyperactivity disorder)      Bipolar 1 disorder (H)      Borderline personality disorder (H)      Depression      Depressive disorder      Intellectual disability      Obesity      Syncope      Past Surgical History:   Procedure Laterality Date      APPENDECTOMY       APPENDECTOMY       metoclopramide (REGLAN) 10 MG tablet  ARIPiprazole ER (ABILIFY MAINTENA) 400 MG extended release inj syringe  benztropine (COGENTIN) 0.5 MG tablet  calcium carbonate (TUMS) 500 MG chewable tablet  chlorhexidine (CHLORHEXIDINE) 0.12 % solution  docusate sodium (COLACE) 100 MG capsule  hydrOXYzine (ATARAX) 50 MG tablet  naltrexone (DEPADE/REVIA) 50 MG tablet  norgestimate-ethinyl estradiol (SPRINTEC 28) 0.25-35 MG-MCG tablet  OLANZapine zydis (ZYPREXA) 5 MG ODT  omeprazole (PRILOSEC) 40 MG DR capsule  ondansetron (ZOFRAN) 4 MG tablet  polyethylene glycol (MIRALAX) 17 g packet  Vitamin D, Cholecalciferol, 25 MCG (1000 UT) TABS      Allergies   Allergen Reactions     Penicillins Rash and Unknown     Family History  Family History   Problem Relation Age of Onset     Diabetes Type 1 Father      Cancer Paternal Grandfather      Social History   Social History     Tobacco Use     Smoking status: Current Some Day Smoker     Packs/day: 1.00     Years: 5.00     Pack years: 5.00     Types: Cigarettes     Smokeless tobacco: Never Used   Substance Use Topics     Alcohol use: No     Drug use: No      Past medical history, past surgical history, medications, allergies, family history, and social history were reviewed with the patient. No additional pertinent items.       Review of Systems  A complete review of systems was performed with pertinent positives and negatives noted in the HPI, and all other systems negative.    Physical Exam   BP: 136/77  Pulse: 78  Temp: 98.3  F (36.8  C)  Resp: 20  SpO2: 100 %  Physical Exam  Vitals and nursing note reviewed.   Constitutional:       General: She is not in acute distress.     Appearance: She is not diaphoretic.   HENT:      Head: Atraumatic.      Mouth/Throat:      Pharynx: No oropharyngeal exudate.   Eyes:      General: No scleral icterus.     Pupils: Pupils are equal, round, and reactive to light.   Cardiovascular:      Heart sounds: Normal  heart sounds.   Pulmonary:      Effort: No respiratory distress.      Breath sounds: Normal breath sounds.   Abdominal:      General: Bowel sounds are decreased.      Palpations: Abdomen is soft.      Tenderness: There is generalized abdominal tenderness. There is no guarding or rebound.   Musculoskeletal:         General: No tenderness.   Skin:     General: Skin is warm.      Coloration: Skin is pale.      Findings: No rash.   Neurological:      General: No focal deficit present.      Mental Status: She is oriented to person, place, and time.   Psychiatric:         Attention and Perception: Attention normal.         Mood and Affect: Mood is depressed. Affect is flat.         Speech: Speech normal.         Behavior: Behavior normal.         Thought Content: Thought content includes suicidal (Patient reports chronic suicidal thoughts, states she is not planning on suicide currently) ideation.         Cognition and Memory: Cognition is impaired (Baseline developmental delay).         ED Course     9:56 AM  The patient was seen and examined by Leo Lowe MD in Room ED12.  Procedures            EKG Interpretation:      Interpreted by Leo Lowe MD  Time reviewed: 1027  Symptoms at time of EKG: Syncopal episode  Rhythm: normal sinus   Rate: normal  Axis: normal  Ectopy: none  Conduction: normal  ST Segments/ T Waves: No ST-T wave changes  Q Waves: none  Comparison to prior: No old EKG available    Clinical Impression: normal EKG        Mental Health Risk Assessment      PSS-3    Date and Time Over the past 2 weeks have you felt down, depressed, or hopeless? Over the past 2 weeks have you had thoughts of killing yourself? Have you ever attempted to kill yourself? When did this last happen? User   01/08/22 1001 yes yes yes within the last 24 hours (including today)       C-SSRS ([place])    Date and Time Q1 Wished to be Dead (Past Month) Q2 Suicidal Thoughts (Past Month) Q3 Suicidal Thought Method Q4 Suicidal  Intent without Specific Plan Q5 Suicide Intent with Specific Plan Q6 Suicide Behavior (Lifetime) Within the Past 3 Months? RETIRED: Level of Risk per Screen Screening Not Complete User   01/08/22 1045 yes yes yes no yes yes -- -- -- IH   01/08/22 1001 yes yes yes yes no yes -- -- --                      Results for orders placed or performed during the hospital encounter of 01/08/22   CT Abdomen Pelvis w Contrast     Status: None    Narrative    EXAM: CT ABDOMEN PELVIS W CONTRAST  LOCATION: [facility]  DATE/TIME: 1/8/2022 12:18 PM    INDICATION: Nausea and vomiting, bowel obstruction suspected.  COMPARISON: CT abdomen and pelvis on 07/09/2021.  TECHNIQUE: CT scan of the abdomen and pelvis was performed following injection of IV contrast. Multiplanar reformats were obtained. Dose reduction techniques were used.  CONTRAST: 107mL isovue 370    FINDINGS:   LOWER CHEST: Mild bibasilar pulmonary opacities, likely atelectasis.    HEPATOBILIARY: Diffuse hyperattenuation of the liver, likely due to underlying hepatic steatosis. Likely small gallstone (series 3 image 176), no CT evidence of acute cholecystitis.    PANCREAS: No main pancreatic ductal dilatation or definite solid pancreatic mass.    SPLEEN: The spleen is mildly enlarged measuring 13 cm in craniocaudal dimension.    ADRENAL GLANDS: No adrenal nodules.    KIDNEYS/BLADDER: No radiodense kidney/ureteral stones or hydronephrosis in either kidney.    BOWEL: No abnormally dilated bowel loops. The appendix is not visualized, could be surgically absent.    PERITONEUM: No significant free fluid in the abdomen or pelvis. No free peritoneal or portal venous gas.     LYMPH NODES: No significant abdominopelvic lymphadenopathy.    VASCULATURE: Unremarkable.    PELVIC ORGANS: Unremarkable.    MUSCULOSKELETAL: No suspicious osseous lesion.      Impression    IMPRESSION:   1.  No acute pathology in the abdomen or pelvis.  2.   Hepatic steatosis.  3.  Mild splenomegaly.  4.  Cholelithiasis without CT evidence of acute cholecystitis.   Comprehensive metabolic panel     Status: Abnormal   Result Value Ref Range    Sodium 141 133 - 144 mmol/L    Potassium 4.0 3.4 - 5.3 mmol/L    Chloride 110 (H) 94 - 109 mmol/L    Carbon Dioxide (CO2) 27 20 - 32 mmol/L    Anion Gap 4 3 - 14 mmol/L    Urea Nitrogen 10 7 - 30 mg/dL    Creatinine 0.74 0.52 - 1.04 mg/dL    Calcium 9.5 8.5 - 10.1 mg/dL    Glucose 127 (H) 70 - 99 mg/dL    Alkaline Phosphatase 60 40 - 150 U/L    AST 18 0 - 45 U/L    ALT 25 0 - 50 U/L    Protein Total 8.4 6.8 - 8.8 g/dL    Albumin 4.0 3.4 - 5.0 g/dL    Bilirubin Total 0.2 0.2 - 1.3 mg/dL    GFR Estimate >90 >60 mL/min/1.73m2   Lipase     Status: Normal   Result Value Ref Range    Lipase 200 73 - 393 U/L   Lactic acid whole blood     Status: Normal   Result Value Ref Range    Lactic Acid 1.0 0.7 - 2.0 mmol/L   UA with Microscopic reflex to Culture     Status: Abnormal    Specimen: Urine, Clean Catch   Result Value Ref Range    Color Urine Straw Colorless, Straw, Light Yellow, Yellow    Appearance Urine Clear Clear    Glucose Urine Negative Negative mg/dL    Bilirubin Urine Negative Negative    Ketones Urine Negative Negative mg/dL    Specific Gravity Urine 1.005 1.003 - 1.035    Blood Urine Negative Negative    pH Urine 6.0 5.0 - 7.0    Protein Albumin Urine Negative Negative mg/dL    Urobilinogen Urine Normal Normal, 2.0 mg/dL    Nitrite Urine Negative Negative    Leukocyte Esterase Urine Negative Negative    Bacteria Urine Few (A) None Seen /HPF    Mucus Urine Present (A) None Seen /LPF    RBC Urine 1 <=2 /HPF    WBC Urine 1 <=5 /HPF    Squamous Epithelials Urine 2 (H) <=1 /HPF    Narrative    Urine Culture not indicated   Magnesium     Status: Normal   Result Value Ref Range    Magnesium 2.3 1.6 - 2.3 mg/dL   Symptomatic; Yes; 1/5/2022 COVID-19 Virus (Coronavirus) by PCR Nasopharyngeal     Status: Normal    Specimen:  Nasopharyngeal; Swab   Result Value Ref Range    SARS CoV2 PCR Negative Negative    Narrative    Testing was performed using the nestor  SARS-CoV-2 & Influenza A/B Assay on the nestor  Tamar  System.  This test should be ordered for the detection of SARS-COV-2 in individuals who meet SARS-CoV-2 clinical and/or epidemiological criteria. Test performance is unknown in asymptomatic patients.  This test is for in vitro diagnostic use under the FDA EUA for laboratories certified under CLIA to perform moderate and/or high complexity testing. This test has not been FDA cleared or approved.  A negative test does not rule out the presence of PCR inhibitors in the specimen or target RNA in concentration below the limit of detection for the assay. The possibility of a false negative should be considered if the patient's recent exposure or clinical presentation suggests COVID-19.  [facility] are certified under the Clinical Laboratory Improvement Amendments of 1988 (CLIA-88) as qualified to perform moderate and/or high complexity laboratory testing.   CBC with platelets and differential     Status: None   Result Value Ref Range    WBC Count 8.8 4.0 - 11.0 10e3/uL    RBC Count 4.66 3.80 - 5.20 10e6/uL    Hemoglobin 13.1 11.7 - 15.7 g/dL    Hematocrit 39.5 35.0 - 47.0 %    MCV 85 78 - 100 fL    MCH 28.1 26.5 - 33.0 pg    MCHC 33.2 31.5 - 36.5 g/dL    RDW 13.2 10.0 - 15.0 %    Platelet Count 278 150 - 450 10e3/uL    % Neutrophils 72 %    % Lymphocytes 22 %    % Monocytes 3 %    % Eosinophils 1 %    % Basophils 1 %    % Immature Granulocytes 1 %    NRBCs per 100 WBC 0 <1 /100    Absolute Neutrophils 6.4 1.6 - 8.3 10e3/uL    Absolute Lymphocytes 1.9 0.8 - 5.3 10e3/uL    Absolute Monocytes 0.3 0.0 - 1.3 10e3/uL    Absolute Eosinophils 0.1 0.0 - 0.7 10e3/uL    Absolute Basophils 0.0 0.0 - 0.2 10e3/uL    Absolute Immature Granulocytes 0.0 <=0.4 10e3/uL    Absolute NRBCs 0.0 10e3/uL   EKG 12-lead, tracing only      Status: None (Preliminary result)   Result Value Ref Range    Systolic Blood Pressure  mmHg    Diastolic Blood Pressure  mmHg    Ventricular Rate 71 BPM    Atrial Rate 71 BPM    DC Interval 184 ms    QRS Duration 90 ms     ms    QTc 436 ms    P Axis 46 degrees    R AXIS 40 degrees    T Axis 13 degrees    Interpretation ECG Sinus rhythm  Normal ECG      hCG qual urine POCT     Status: Normal   Result Value Ref Range    HCG Qual Urine Negative Negative    Internal QC Check POCT Valid Valid    POCT Kit Lot Number [id number]     POCT Kit Expiration Date 2023-04-30    CBC with platelets differential     Status: None    Narrative    The following orders were created for panel order CBC with platelets differential.  Procedure                               Abnormality         Status                     ---------                               -----------         ------                     CBC with platelets and d...[[id number]]                      Final result                 Please view results for these tests on the individual orders.     Medications   sodium chloride 0.9 % infusion (  Canceled Entry 1/8/22 1303)   sodium chloride 0.9 % bag 500mL for CT scan flush use (67 mLs As instructed Given 1/8/22 1241)   ondansetron (ZOFRAN) injection 8 mg (8 mg Intravenous Given 1/8/22 1115)   LORazepam (ATIVAN) injection 0.5 mg (0.5 mg Intravenous Given 1/8/22 1115)   0.9% sodium chloride BOLUS (1,000 mLs Intravenous New Bag 1/8/22 1118)   iopamidol (ISOVUE-370) solution 125 mL (107 mLs Intravenous Given 1/8/22 1251)        Assessments & Plan (with Medical Decision Making)   21-year-old woman who has a number of mental health diagnoses and is a resident of a group home is presenting with 2 to 3-day history of nausea and vomiting with generalized abdominal discomfort.  Also reported a syncopal spell yesterday, which was not associated with any significant trauma palpitations or chest pain.  Initial differential diagnosis includes  but is not restricted to small bowel obstruction, peptic ulcer disease or non-ulcer dyspepsia, infectious gastroenteritis, COVID-19, pancreatitis, cholecystitis, hepatitis, gastroparesis, gastric outlet obstruction, adverse drug reaction,  anxiety reaction.  On exam the patient has normal vital signs.  She is somewhat pale, does not appear in acute distress.  Mucous membranes slightly dry.  She has generalized abdominal tenderness without peritoneal signs.  No focal neurologic deficits.  Remainder physical exam unremarkable.  Patient's labs, imaging are all negative for any evidence of an acute life-threatening metabolic process in the abdomen or life-threatening etiology of vomiting.  The symptoms appear most likely related to a self-limited process such as infectious gastroenteritis.  She did improve with IV fluids and antiemetics in the ED, serial exam benign, requesting to be discharged.  Continues to state that there is no change in her baseline mental illness and believes that she will be safe at the [facility] home..  With respect to her reported syncopal event in the context of nausea and vomiting this could have been due to orthostatic hypotension (now improved with IV fluid hydration) or potentially a vasovagal reaction related to nausea and vomiting.  Her EKG is normal, there is no history of structural heart disease, no prior history of cardiac disease of any kind, no signs of ischemia, no significant risk factors for arrhythmia no EKG findings to support congenital conduction disease, and no evidence of valvular heart disease or medication induced QT prolongation.  Based on the clinical findings and the entire clinical scenario, the patient appears stable at this time to be treated symptomatically, and to follow-up as an outpatient for any further evaluation and treatment.  Discussed expected course, need for follow up, and indications for return with the patient.  See discharge instructions.  '  I have  reviewed the nursing notes. I have reviewed the findings, diagnosis, plan and need for follow up with the patient.    Current Discharge Medication List          Final diagnoses:   Non-intractable vomiting with nausea, unspecified vomiting type       --  I, Beverly Howard, am serving as a trained medical scribe to document services personally performed by Leo Lowe MD, based on the provider's statements to me.     ILeo MD, was physically present and have reviewed and verified the accuracy of this note documented by Beverly Howard.    Leo Lowe MD  [facility] EMERGENCY DEPARTMENT  1/8/2022     Leo Lowe MD  01/08/22 3293

## 2022-01-08 NOTE — DISCHARGE INSTRUCTIONS
Thank you for choosing [facility].     Please closely monitor for further symptoms. Return to the Emergency Department if you develop any new or worsening signs or symptoms.    If you received any opiate pain medications or sedatives during your visit, please do not drive for at least 8 hours.     Labs, cultures or final xray interpretations may still need to be reviewed.  We will call you if your plan of care needs to be changed.    Please follow up with your primary care physician or clinic.

## 2022-01-08 NOTE — ED TRIAGE NOTES
"Pt stated she collapsed yesterday evening and stated \"this morning I threw up all my medications, feel nauseous, dizzy and have body aches since 7 am\". Pt roomed by writer and 1:1 present d/t history of suicidality  "

## 2022-01-08 NOTE — ED NOTES
Have contacted Arlene at the pt's group home. Arlene states that the pt is her own guardian. The pt can return to the group home in a cab. Someone will be at the group home to let her in

## 2022-01-08 NOTE — ED TRIAGE NOTES
Per [facility] - Patient called she vomited x2. Client seen yesterday for same thing. Per patient - she collapsed when she got home. Client reports suicidal ideation.     Hx- BPD , Anxiety / depression, hallucinations auditory and visional    V/S stable -

## 2022-01-09 NOTE — ED PROVIDER NOTES
[facility] EMERGENCY DEPARTMENT ([facility])    1/08/22     ED 6  7:20 PM   History     Chief Complaint   Patient presents with     Nausea & Vomiting     Suicidal     The history is provided by the patient and medical records.     Sadaf Ross is a 21 year old female with history of bipolar 1 disorder, depression, ADHD, intellectual disability, chronic suicidal ideation, and prior suicide attempt who presents to the Emergency Department again today for behavioral evaluation.  She resides in a group home with 2 other clients. She was seen in the ED just earlier today with complaints of nausea, vomiting, and suicidal ideation.  She was evaluated by Dr. Lowe who performed work-up with labs, CT of the abdomen.  She was treated with Zofran, Ativan, IV fluids.  She improved and was discharged back to her group home. She returned to group home, had pizza and vomited once. She became acutely suicidal and voiced a specific plan to kill herself via cutting her throat. Staff locked sharps and called 911 and patient was brought back to the Emergency Department for evaluation. Here she is still actively suicidal and unwilling to contract for safety.  She was also seen yesterday in the emergency department with suicidal ideation, nausea and vomiting.  She was seen by Dr. Vicente who performed work-up with labs which were unrevealing for cause of symptoms.  Here she reported not having suicidal ideation so she was discharged back to group home.  Group home reported to Dr. Vicente notices similar to her prior patterns with her.  Indeed, she does have a history of multiple ED visits.  She has had 63 emergency department visits over the past 365 days, or a visit every 5-6 days.    Patient was seen by DEC , please see her note for further details.  DEC  also spoke with patient's group home.  Group home staff states that patient reports vomiting but won't show them her emesis.  At times she  "reports feeling nauseated, sometimes reaches into her throat to try to induce vomiting but not successful at actually producing any emesis.  Group home staff reported that she goes on ambulance rides for fun. DEC  asked patient if she likes ambulance rides and she states \"Yeah I do!\" She is currently her own guardian, has her own cellphone, and group home is limited as to what they can do in curbing these behaviors.  had a talk with her last month and told her that she was on verge of losing cellphone access and privileges and getting a guardian and for a time patient was not seeking EMS services as frequently has resumed her past usage, with 3 ED visits in the past 24 hours. Group home is amenable to taking her back, they state that she is not able to harm herself there. No changes in her mood.  Group home staff report that today she was baking a cake today and dancing to music, not in any distress.     Past Medical History  Past Medical History:   Diagnosis Date     ADHD (attention deficit hyperactivity disorder)      Bipolar 1 disorder (H)      Borderline personality disorder (H)      Depression      Depressive disorder      Intellectual disability      Obesity      Syncope      Past Surgical History:   Procedure Laterality Date     APPENDECTOMY       APPENDECTOMY       ARIPiprazole ER (ABILIFY MAINTENA) 400 MG extended release inj syringe  benztropine (COGENTIN) 0.5 MG tablet  hydrOXYzine (ATARAX) 50 MG tablet  metoclopramide (REGLAN) 10 MG tablet  naltrexone (DEPADE/REVIA) 50 MG tablet  OLANZapine zydis (ZYPREXA) 5 MG ODT  calcium carbonate (TUMS) 500 MG chewable tablet  chlorhexidine (CHLORHEXIDINE) 0.12 % solution  docusate sodium (COLACE) 100 MG capsule  norgestimate-ethinyl estradiol (SPRINTEC 28) 0.25-35 MG-MCG tablet  omeprazole (PRILOSEC) 40 MG DR capsule  ondansetron (ZOFRAN) 4 MG tablet  polyethylene glycol (MIRALAX) 17 g packet  Vitamin D, Cholecalciferol, 25 MCG (1000 UT) " "TABS      Allergies   Allergen Reactions     Penicillins Rash and Unknown     Family History  Family History   Problem Relation Age of Onset     Diabetes Type 1 Father      Cancer Paternal Grandfather      Social History   Social History     Tobacco Use     Smoking status: Current Some Day Smoker     Packs/day: 1.00     Years: 5.00     Pack years: 5.00     Types: Cigarettes     Smokeless tobacco: Never Used   Substance Use Topics     Alcohol use: No     Drug use: No      Past medical history, past surgical history, medications, allergies, family history, and social history were reviewed with the patient. No additional pertinent items.       Review of Systems  A complete review of systems was performed with pertinent positives and negatives noted in the HPI, and all other systems negative.    Physical Exam   BP: (!) 151/99  Pulse: 94  Temp: 98.2  F (36.8  C)  Resp: 16  Height: 162.6 cm (5' 4\")  Weight: 98.9 kg (218 lb) (Stated)  SpO2: 98 %  Physical Exam  Constitutional:       General: She is not in acute distress.     Appearance: She is not diaphoretic.   HENT:      Head: Atraumatic.      Mouth/Throat:      Pharynx: No oropharyngeal exudate.   Eyes:      General: No scleral icterus.     Pupils: Pupils are equal, round, and reactive to light.   Cardiovascular:      Heart sounds: Normal heart sounds.   Pulmonary:      Effort: No respiratory distress.      Breath sounds: Normal breath sounds.   Abdominal:      General: Bowel sounds are normal.      Palpations: Abdomen is soft.      Tenderness: There is no abdominal tenderness.   Musculoskeletal:         General: No tenderness.   Skin:     General: Skin is warm.      Findings: No rash.   Neurological:      General: No focal deficit present.      Mental Status: She is oriented to person, place, and time.      Motor: No weakness.      Coordination: Coordination normal.   Psychiatric:         Mood and Affect: Mood is anxious.         Speech: Speech normal.         " Behavior: Behavior is cooperative.         Thought Content: Thought content includes suicidal ideation.         ED Course      Procedures     Patient was seen and evaluated by the  please refer to their documentation in the note section of the epic chart dated 1/8/2022  The medical record was reviewed and interpreted.  Current labs reviewed and interpreted.      Suicide assessment completed by mental health (D.E.C., LCSW, etc.)       Results for orders placed or performed during the hospital encounter of 01/08/22   CT Abdomen Pelvis w Contrast     Status: None    Narrative    EXAM: CT ABDOMEN PELVIS W CONTRAST  LOCATION: [facility]  DATE/TIME: 1/8/2022 12:18 PM    INDICATION: Nausea and vomiting, bowel obstruction suspected.  COMPARISON: CT abdomen and pelvis on 07/09/2021.  TECHNIQUE: CT scan of the abdomen and pelvis was performed following injection of IV contrast. Multiplanar reformats were obtained. Dose reduction techniques were used.  CONTRAST: 107mL isovue 370    FINDINGS:   LOWER CHEST: Mild bibasilar pulmonary opacities, likely atelectasis.    HEPATOBILIARY: Diffuse hyperattenuation of the liver, likely due to underlying hepatic steatosis. Likely small gallstone (series 3 image 176), no CT evidence of acute cholecystitis.    PANCREAS: No main pancreatic ductal dilatation or definite solid pancreatic mass.    SPLEEN: The spleen is mildly enlarged measuring 13 cm in craniocaudal dimension.    ADRENAL GLANDS: No adrenal nodules.    KIDNEYS/BLADDER: No radiodense kidney/ureteral stones or hydronephrosis in either kidney.    BOWEL: No abnormally dilated bowel loops. The appendix is not visualized, could be surgically absent.    PERITONEUM: No significant free fluid in the abdomen or pelvis. No free peritoneal or portal venous gas.     LYMPH NODES: No significant abdominopelvic lymphadenopathy.    VASCULATURE: Unremarkable.    PELVIC ORGANS:  Unremarkable.    MUSCULOSKELETAL: No suspicious osseous lesion.      Impression    IMPRESSION:   1.  No acute pathology in the abdomen or pelvis.  2.  Hepatic steatosis.  3.  Mild splenomegaly.  4.  Cholelithiasis without CT evidence of acute cholecystitis.   Comprehensive metabolic panel     Status: Abnormal   Result Value Ref Range    Sodium 141 133 - 144 mmol/L    Potassium 4.0 3.4 - 5.3 mmol/L    Chloride 110 (H) 94 - 109 mmol/L    Carbon Dioxide (CO2) 27 20 - 32 mmol/L    Anion Gap 4 3 - 14 mmol/L    Urea Nitrogen 10 7 - 30 mg/dL    Creatinine 0.74 0.52 - 1.04 mg/dL    Calcium 9.5 8.5 - 10.1 mg/dL    Glucose 127 (H) 70 - 99 mg/dL    Alkaline Phosphatase 60 40 - 150 U/L    AST 18 0 - 45 U/L    ALT 25 0 - 50 U/L    Protein Total 8.4 6.8 - 8.8 g/dL    Albumin 4.0 3.4 - 5.0 g/dL    Bilirubin Total 0.2 0.2 - 1.3 mg/dL    GFR Estimate >90 >60 mL/min/1.73m2   Lipase     Status: Normal   Result Value Ref Range    Lipase 200 73 - 393 U/L   Lactic acid whole blood     Status: Normal   Result Value Ref Range    Lactic Acid 1.0 0.7 - 2.0 mmol/L   UA with Microscopic reflex to Culture     Status: Abnormal    Specimen: Urine, Clean Catch   Result Value Ref Range    Color Urine Straw Colorless, Straw, Light Yellow, Yellow    Appearance Urine Clear Clear    Glucose Urine Negative Negative mg/dL    Bilirubin Urine Negative Negative    Ketones Urine Negative Negative mg/dL    Specific Gravity Urine 1.005 1.003 - 1.035    Blood Urine Negative Negative    pH Urine 6.0 5.0 - 7.0    Protein Albumin Urine Negative Negative mg/dL    Urobilinogen Urine Normal Normal, 2.0 mg/dL    Nitrite Urine Negative Negative    Leukocyte Esterase Urine Negative Negative    Bacteria Urine Few (A) None Seen /HPF    Mucus Urine Present (A) None Seen /LPF    RBC Urine 1 <=2 /HPF    WBC Urine 1 <=5 /HPF    Squamous Epithelials Urine 2 (H) <=1 /HPF    Narrative    Urine Culture not indicated   Magnesium     Status: Normal   Result Value Ref Range     Magnesium 2.3 1.6 - 2.3 mg/dL   Symptomatic; Yes; 1/5/2022 COVID-19 Virus (Coronavirus) by PCR Nasopharyngeal     Status: Normal    Specimen: Nasopharyngeal; Swab   Result Value Ref Range    SARS CoV2 PCR Negative Negative    Narrative    Testing was performed using the nestor  SARS-CoV-2 & Influenza A/B Assay on the nestor  Tamar  System.  This test should be ordered for the detection of SARS-COV-2 in individuals who meet SARS-CoV-2 clinical and/or epidemiological criteria. Test performance is unknown in asymptomatic patients.  This test is for in vitro diagnostic use under the FDA EUA for laboratories certified under CLIA to perform moderate and/or high complexity testing. This test has not been FDA cleared or approved.  A negative test does not rule out the presence of PCR inhibitors in the specimen or target RNA in concentration below the limit of detection for the assay. The possibility of a false negative should be considered if the patient's recent exposure or clinical presentation suggests COVID-19.  [facility] are certified under the Clinical Laboratory Improvement Amendments of 1988 (CLIA-88) as qualified to perform moderate and/or high complexity laboratory testing.   CBC with platelets and differential     Status: None   Result Value Ref Range    WBC Count 8.8 4.0 - 11.0 10e3/uL    RBC Count 4.66 3.80 - 5.20 10e6/uL    Hemoglobin 13.1 11.7 - 15.7 g/dL    Hematocrit 39.5 35.0 - 47.0 %    MCV 85 78 - 100 fL    MCH 28.1 26.5 - 33.0 pg    MCHC 33.2 31.5 - 36.5 g/dL    RDW 13.2 10.0 - 15.0 %    Platelet Count 278 150 - 450 10e3/uL    % Neutrophils 72 %    % Lymphocytes 22 %    % Monocytes 3 %    % Eosinophils 1 %    % Basophils 1 %    % Immature Granulocytes 1 %    NRBCs per 100 WBC 0 <1 /100    Absolute Neutrophils 6.4 1.6 - 8.3 10e3/uL    Absolute Lymphocytes 1.9 0.8 - 5.3 10e3/uL    Absolute Monocytes 0.3 0.0 - 1.3 10e3/uL    Absolute Eosinophils 0.1 0.0 - 0.7 10e3/uL    Absolute Basophils  0.0 0.0 - 0.2 10e3/uL    Absolute Immature Granulocytes 0.0 <=0.4 10e3/uL    Absolute NRBCs 0.0 10e3/uL   EKG 12-lead, tracing only     Status: None (Preliminary result)   Result Value Ref Range    Systolic Blood Pressure  mmHg    Diastolic Blood Pressure  mmHg    Ventricular Rate 71 BPM    Atrial Rate 71 BPM    AK Interval 184 ms    QRS Duration 90 ms     ms    QTc 436 ms    P Axis 46 degrees    R AXIS 40 degrees    T Axis 13 degrees    Interpretation ECG Sinus rhythm  Normal ECG      hCG qual urine POCT     Status: Normal   Result Value Ref Range    HCG Qual Urine Negative Negative    Internal QC Check POCT Valid Valid    POCT Kit Lot Number [id number]     POCT Kit Expiration Date 2023-04-30    CBC with platelets differential     Status: None    Narrative    The following orders were created for panel order CBC with platelets differential.  Procedure                               Abnormality         Status                     ---------                               -----------         ------                     CBC with platelets and d...[[id number]]                      Final result                 Please view results for these tests on the individual orders.     Medications - No data to display     Assessments & Plan (with Medical Decision Making)       I have reviewed the nursing notes. I have reviewed the findings, diagnosis, plan and need for follow up with the patient.    Patient with borderline personality disorder intellectual disability with complaint of nausea also presenting now with increased suicidal ideation which is essentially at baseline for her patient was discussed at length with the group home at this time they will be pursuing the possibility of guardianship patient has had chronic behaviors recurring visits to the emergency room all of which lead to the likelihood that she may need guardianship to help manage her situation.    Final diagnoses:   Borderline personality disorder (H)    Intellectual disability   Nausea   I, Larisa Grant, am serving as a trained medical scribe to document services personally performed by Robert Olea MD based on the provider's statements to me on January 8, 2022.  This document has been checked and approved by the attending provider.    I, Robert Olea MD, was physically present and have reviewed and verified the accuracy of this note documented by Larisa Grant, medical scribe.      Robert Olea MD      [facility] EMERGENCY DEPARTMENT  1/8/2022     Robert Olea MD  01/10/22 [id number]

## 2022-01-09 NOTE — ED NOTES
Arlene, group home staff, confirmed that she will be at home to receive patient.  Arlene confirmed group home address.

## 2022-01-09 NOTE — DISCHARGE INSTRUCTIONS
Aftercare Plan  You are being discharged back to the care of your staff member Arlene at your group home:   [address] [place] [id number]  [phone]    Please follow up with your mental joselyn  Jyoti Malik, [facility] ([phone])    If I am feeling unsafe or I am in a crisis, I will:     1. Talk to my group home staff  2. Call a local crisis team  3. Use my coping skills  4. Call 911 only if my group home states that the call is necessary    Warning signs that I or other people might notice when a crisis is developing for me: Boredom, feeling unhappy, feeling sad, changes in routine    Things I am able to do on my own to cope or help me feel better: Listen to music, dance, cook food, bake, talk to other long term residents and staff     Things that I am able to do with others to cope or help me better: Talk about my feelings, go for a walk, play a game, do a puzzle, stay busy     Changes I can make to support my mental health and wellness: Limit use of the cell phone, talk about my feelings, tell someone when I'm bored or need attention     People in my life that I can ask for help: Arlene at the group home, Jyoti with case management, mom Yarely and sister    Your [facility] has a mental health crisis team you can call 24/7: [facility] Mobile Crisis  [phone] (adults)    Other things that are important when I m in crisis: Talk to your group home staff before calling 911.      Crisis Lines  Crisis Text Line  Text [phone]  You will be connected with a trained live crisis counselor to provide support.    [facility] Hope Line  1.800.SUICIDE [[phone]]

## 2022-01-09 NOTE — ED NOTES
Bed: ED06  Expected date: 1/8/22  Expected time: 7:12 PM  Means of arrival:   Comments:  [employer]  21F  Feeling unwell - was just discharged from ER

## 2022-01-09 NOTE — CONSULTS
1/8/2022  Sadaf Ross 2000     [facility] Crisis Assessment    Patient was assessed: remote  Patient location: [facility]    Referral Data and Chief Complaint  Sadaf is a 21 year old who uses she/her pronouns. Patient presented to the ED via EMS after calling 911 for the fourth time in 24 hours. The patient has a history of Borderline personality disorder and an Intellectual disability. She has a history of baseline suicidal ideation and frequent ER visits, most consistently happening when she is bored. The patient reports tonight that she continues to experience her chronic baseline suicidal ideation, as well as nausea and stomach pain.     Informed Consent and Assessment Methods    Patient is her own guardian. Writer met with patient and explained the crisis assessment process, including applicable information disclosures and limits to confidentiality, assessed understanding of the process, and obtained consent to proceed with the assessment. Patient was observed to be able to participate in the assessment as evidenced by her verbal agreement and engagement in the assessment. Assessment methods included conducting a formal interview with patient, review of medical records, collaboration with medical staff, and obtaining relevant collateral information from family and community providers when available.    Narrative Summary of Presenting Problem and Current Functioning, History and Collateral    Patient presented to the ED via EMS after calling 911 for the fourth time in 24 hours. The patient was seen in the ER yesterday, assessed and discharged back to the group home. While in the ambulance being transported back to the group home the patient called 911 an additional time stating she needed to go back to the hospital. Group home staff were able to successfully de-escalate her and get her engaged in some activities so she was agreeable to stay at the residence. This morning the patient called 911 again  stating that she is having abdominal pain. She was assessed in the ER and discharged home, only for her to call 911 a fourth time in 24 hours and be transported back to the ER. The patient's group home staff member Arlene ([phone]) states that in between her ER visits the patient was observed baking a cake with other residents, listening to music, and seemingly pacified. She has not observed Sadaf to be in any kind of distress. She believes the patient was able to refrain from calling 911 over the holidays because she was busy with activities, but also because she had a conversation with her  in which it was explained to her that if the behavior continues she could lose decision making capacity for herself, be given a guardian, and lose access to her cell phone. Arlene believes this was motivating for a period of time, however now the patient is back to wanting to be transports. She reportedly told another group [place] resident that she enjoys the act of being transported in an ambulance as a leisure activity.     This writer spoke with the patient who confirmed that she told another resident she enjoys the ambulance rides from an entertainment perspective. Writer emphasized to the patient how important it is that she refrain from using EMS as a leisure activity. Patient expressed understanding, however it is difficult to grasp how much information she is tracking, or what kind of baseline ability she has to control herself. This writer left a voicemail for the patient's MHR  Jyoti Malik asking that she again have a talk with the patient about potential guardianship and loss of cell phone access.     The patient has a history of Borderline personality disorder and an Intellectual disability. She has a history of baseline suicidal ideation and frequent ER visits, most consistently happening when she is bored. The patient reports tonight that she continues to experience her chronic baseline  "suicidal ideation, as well as nausea and stomach pain. The patient resides in a facility that secures all medications and sharps. She has no history of harming herself under their care and the staff are agreeable to taking her back tonight. Patient's behavior is consistent with her diagnoses.     This writer is familiar with this patient's case and assessed the patient previous times including in the past month on 12/14/21 and 12/21/21. As previously documented by this writer \"After a thorough review of the patient's medical record and receiving collateral information from the patient's group home staff, it is this writer's recommendation that she will be best cared for by her outpatient providers and group home staff. The patient is able to quickly identify her coping skills, safety plan, and distraction activities. She continues to endorse passive suicidal ideation, however she will be under 1:1 care at her group home where she has no access to sharps, medications, or other means to harm herself. It is believed that while the patient is struggling with some depression, she is currently at her baseline with chronic suicidal ideation. Historically these symptoms are not mitigated by inpatient hospitalization, and the patient is established with all necessary services in the community.\"    Risk Assessment    Risk of Harm to Self     ESS-6  1.a. Over the past 2 weeks, have you had thoughts of killing yourself? Yes   1.b. Have you ever attempted to kill yourself and, if yes, when did this last happen? Yes  2. Recent or current suicide plan? Yes  3. Recent or current intent to act on ideation? No  4. Lifetime psychiatric hospitalization? Yes  5. Pattern of excessive substance use? No  6. Current irritability, agitation, or aggression? No  ESS-6 Score: 3/6    The patient has the following risk factors for suicide: depressive symptoms, isolation, poor decision making, poor impulse control and family disruption    Is the " patient experiencing current suicidal ideation: At baseline patient experiences chronic, passive suicidal ideation with no plan or intent. At times she will reference a plan, like cutting her throat, however she resides in a group home that secures all sharps and medications. She has never been able to act on her thoughts while under their care.     Is the patient engaging in preparatory suicide behaviors (formulating how to act on plan, giving away possessions, saying goodbye, displaying dramatic behavior changes, etc)? No    Does the patient have access to firearms or other lethal means? no    The patient has the following protective factors: social support, established relationship community mental health provider(s), future focused thinking and safe/stable housing    Does the patient engage in non-suicidal self-injurious behavior (NSSI/SIB)? no    Is the patient vulnerable to sexual exploitation?  Yes patient has an intellectual disability    Is the patient experiencing abuse or neglect? no    Is the patient a vulnerable adult? Yes    Risk of Harm to Others  The patient has the following risk factors of harm to others: no risk factors identified    Does the patient have thoughts of harming others? No    Is the patient engaging in sexually inappropriate behavior?  no     Current Substance Abuse    Is there recent substance abuse? no    Was a urine drug screen or blood alcohol level obtained: No    Current Symptoms/Concerns    Symptoms  Attention, hyperactivity, and impulsivity symptoms present: No    Anxiety symptoms present: No      Appetite symptoms present: No     Behavioral difficulties present: Yes: Impulsivity/Disinhibition and Withdrawal/Isolation     Cognitive impairment symptoms present: Yes: Decision-Making and Judgment/Insight    Depressive symptoms present: Yes Depressed mood, Feelings of helplessness , Feelings of hopelessness , Impaired concentration and Impaired decision making      Eating disorder  symptoms present: No    Learning disabilities, cognitive challenges, and/or developmental disorder symptoms present: Yes: Functional Impairments, Mood and Self-Regulation     Manic/hypomanic symptoms present: No    Personality and interpersonal functioning difficulties present : Yes: Impaired Impulse Control and Impaired Interpersonal Functioning    Psychosis symptoms present: No      Sleep difficulties present: No    Substance abuse disorder symptoms present: No     Trauma and stressor related symptoms present: No     Mental Status Exam   Affect: Blunted   Appearance: Appropriate    Attention Span/Concentration: Attentive?    Eye Contact: Engaged   Fund of Knowledge: Delayed    Language /Speech Content: Fluent   Language /Speech Volume: Soft    Language /Speech Rate/Productions: Slow    Recent Memory: Intact   Remote Memory: Intact   Mood: Apathetic    Orientation to Person: Yes    Orientation to Place: Yes   Orientation to Time of Day: Yes    Orientation to Date: Yes    Situation (Do they understand why they are here?): Yes    Psychomotor Behavior: Normal    Thought Content: Clear   Thought Form: Intact       Mental Health and Substance Abuse History    History  Current and historical diagnoses or mental health concerns: The patient has a history of Borderline personality disorder and an Intellectual disability.     Prior MH services (inpatient, programmatic care, outpatient, etc) : History of frequent and numerous ER visits with report of passive suicidal ideation and plan. Patient's plan always consists of a means that she does not have access to in her current group home placement.     History of substance abuse: No    Prior LIZZETTE services (inpatient, programmatic care, detox, outpatient, etc) : No    History of commitment: No    Family history of MH/LIZZETTE: Yes details unknown    Trauma history: Yes details unknown    Medication  Psychotropic medications: Yes, see medication list    Current Care Team    Primary  Care Provider: Yes, [facility] ([phone])  Psychiatrist: Yes, [facility] [place] ([phone])  Therapist: Yes, [facility] [place] ([phone])  : Yes, Jyoti Malik, [facility] ([phone])  CTSS or ARMHS: No  ACT Team: No  Other: No    Release of Information  Was a release of information signed: No. Reason: patient refused, no referral made      Biopsychosocial Information    Socioeconomic Information  Current living situation: group home    Employment/income source: SSDI    Relevant legal issues: None    Cultural, Sikh, or spiritual influences on mental health care: None    Is the patient active in the  or a : No    Relevant Medical Concerns   Patient identifies concerns with completing ADLs? No     Patient can ambulate independently? Yes     Other medical concerns? No     History of concussion or TBI? Not assessed    Diagnosis    Borderline personality disorder (F60.3)  Intellectual disability, moderate (F71)  Post-traumatic stress disorder, chronic (F43.12)    Therapeutic Intervention  The following therapeutic methodologies were employed when working with the patient: establishing rapport, active listening and assessing dimensions of crisis.     Disposition  Recommended disposition: Return to group home and established outpatient providers    Reviewed case and recommendations with attending provider. Attending Name: Dr. Olea      Attending concurs with disposition: Yes      Patient concurs with disposition: No: patient prefers to stay at the hospital      Final disposition: Return to group home and established providers    Clinical Substantiation of Recommendations   Rationale with supporting factors for disposition and diagnosis.     Patient presented to the ED via EMS after calling 911 for the fourth time in 24 hours. The patient has a history of Borderline personality  disorder and an Intellectual disability. She has a history of baseline suicidal ideation and frequent ER visits, most consistently happening when she is bored. The patient reports tonight that she continues to experience her chronic baseline suicidal ideation, as well as nausea and stomach pain. The patient can not identify any acute psychiatric or medical needs. Her group home states that they have identified no change in mood, and prior to arriving in the ER she was baking a cake with other residents, listening to music, dancing, and not in any observable distress. The patient resides in a facility that secures all medications and sharps. She has no history of harming herself under their care and the staff are agreeable to taking her back tonight. Patient's behavior is consistent with her diagnoses. A discussion was had with group home staff regarding the patient's need for guardianship and a legal ability to restrict her access to a cell phone. Writer left a voicemail with the patient's  asking for some action steps to be taken in this regard.     Assessment Details  Patient interview started at: 8:50 pm and completed at: 9:10pm.    Total duration spent on the patient case in minutes: 1.0 hrs     CPT code(s) utilized: 05671 - Psychotherapy (with patient) - 30 (16-37*) min     Aftercare and Safety Planning2  Follow up plans with MH/LIZZETTE services: No      Aftercare plan placed in the AVS and provided to patient: Yes. Given to patient by attending RN    Bernice Taylor, [facility]      Aftercare Plan  You are being discharged back to the care of your staff member Arlene at your group home:   [address] [place] [id number]  [phone]    Please follow up with your mental joselyn  Jyoti Malik, [facility] ([phone])    If I am feeling unsafe or I am in a crisis, I will:     1. Talk to my group home staff  2. Call a local crisis team  3. Use my coping skills  4. Call [phone]  only if my group home states that the call is necessary    Warning signs that I or other people might notice when a crisis is developing for me: Boredom, feeling unhappy, feeling sad, changes in routine    Things I am able to do on my own to cope or help me feel better: Listen to music, dance, cook food, bake, talk to other [facility] residents and staff     Things that I am able to do with others to cope or help me better: Talk about my feelings, go for a walk, play a game, do a puzzle, stay busy     Changes I can make to support my mental health and wellness: Limit use of the cell phone, talk about my feelings, tell someone when I'm bored or need attention     People in my life that I can ask for help: Arlene at the group [facility], Jyoti with case management, mom Yarely and sister    [facility] has a mental health crisis team you can call 24/7: [facility] Mobile Crisis  [phone] (adults)    Other things that are important when I m in crisis: Talk to your group [place] staff before calling 911.      Crisis Lines  Crisis Text Line  Text [phone]  You will be connected with a trained live crisis counselor to provide support.    [facility] Hope [facility]  1.800.SUICIDE [7605878]

## 2022-01-09 NOTE — ED TRIAGE NOTES
"Patient presents via EMS for nausea/vomiting. States she was seen this afternoon in the ED and does not feel better. Reports vomit x1 this evening after eating pizza. Also reporting suicidal ideation. States, \"I would cut my neck with a knife\". Denies recent stressors that have increased suicidality. Lives in [facility] home.  Sabine Salinas RN on 1/8/2022 at 7:24 PM    "

## 2022-01-11 LAB
ATRIAL RATE - MUSE: 71 BPM
DIASTOLIC BLOOD PRESSURE - MUSE: NORMAL MMHG
INTERPRETATION ECG - MUSE: NORMAL
P AXIS - MUSE: 46 DEGREES
PR INTERVAL - MUSE: 184 MS
QRS DURATION - MUSE: 90 MS
QT - MUSE: 402 MS
QTC - MUSE: 436 MS
R AXIS - MUSE: 40 DEGREES
SYSTOLIC BLOOD PRESSURE - MUSE: NORMAL MMHG
T AXIS - MUSE: 13 DEGREES
VENTRICULAR RATE- MUSE: 71 BPM

## 2022-01-16 ENCOUNTER — HOSPITAL ENCOUNTER (EMERGENCY)
Facility: CLINIC | Age: 22
Discharge: HOME | End: 2022-01-16
Attending: STUDENT IN AN ORGANIZED HEALTH CARE EDUCATION/TRAINING PROGRAM | Admitting: STUDENT IN AN ORGANIZED HEALTH CARE EDUCATION/TRAINING PROGRAM
Payer: MEDICARE

## 2022-01-16 ENCOUNTER — APPOINTMENT (OUTPATIENT)
Dept: ULTRASOUND IMAGING | Facility: CLINIC | Age: 22
End: 2022-01-16
Attending: STUDENT IN AN ORGANIZED HEALTH CARE EDUCATION/TRAINING PROGRAM
Payer: MEDICARE

## 2022-01-16 VITALS
TEMPERATURE: 97.6 F | SYSTOLIC BLOOD PRESSURE: 114 MMHG | OXYGEN SATURATION: 99 % | DIASTOLIC BLOOD PRESSURE: 79 MMHG | RESPIRATION RATE: 16 BRPM | HEART RATE: 66 BPM

## 2022-01-16 DIAGNOSIS — R10.11 ABDOMINAL PAIN, RIGHT UPPER QUADRANT: ICD-10-CM

## 2022-01-16 LAB
ALBUMIN SERPL-MCNC: 3.7 G/DL (ref 3.4–5)
ALP SERPL-CCNC: 57 U/L (ref 40–150)
ALT SERPL W P-5'-P-CCNC: 29 U/L (ref 0–50)
ANION GAP SERPL CALCULATED.3IONS-SCNC: 6 MMOL/L (ref 3–14)
AST SERPL W P-5'-P-CCNC: 18 U/L (ref 0–45)
B-HCG SERPL-ACNC: <1 IU/L (ref 0–5)
BASOPHILS # BLD AUTO: 0.1 10E3/UL (ref 0–0.2)
BASOPHILS NFR BLD AUTO: 1 %
BILIRUB SERPL-MCNC: 0.2 MG/DL (ref 0.2–1.3)
BUN SERPL-MCNC: 14 MG/DL (ref 7–30)
CALCIUM SERPL-MCNC: 8.8 MG/DL (ref 8.5–10.1)
CHLORIDE BLD-SCNC: 108 MMOL/L (ref 94–109)
CO2 SERPL-SCNC: 26 MMOL/L (ref 20–32)
CREAT SERPL-MCNC: 0.78 MG/DL (ref 0.52–1.04)
CRP SERPL-MCNC: 4.9 MG/L (ref 0–8)
EOSINOPHIL # BLD AUTO: 0.3 10E3/UL (ref 0–0.7)
EOSINOPHIL NFR BLD AUTO: 3 %
ERYTHROCYTE [DISTWIDTH] IN BLOOD BY AUTOMATED COUNT: 12.8 % (ref 10–15)
GFR SERPL CREATININE-BSD FRML MDRD: >90 ML/MIN/1.73M2
GLUCOSE BLD-MCNC: 100 MG/DL (ref 70–99)
HCT VFR BLD AUTO: 38.4 % (ref 35–47)
HGB BLD-MCNC: 12.8 G/DL (ref 11.7–15.7)
IMM GRANULOCYTES # BLD: 0 10E3/UL
IMM GRANULOCYTES NFR BLD: 1 %
LACTATE SERPL-SCNC: 1.1 MMOL/L (ref 0.7–2)
LYMPHOCYTES # BLD AUTO: 3 10E3/UL (ref 0.8–5.3)
LYMPHOCYTES NFR BLD AUTO: 34 %
MCH RBC QN AUTO: 27.9 PG (ref 26.5–33)
MCHC RBC AUTO-ENTMCNC: 33.3 G/DL (ref 31.5–36.5)
MCV RBC AUTO: 84 FL (ref 78–100)
MONOCYTES # BLD AUTO: 0.4 10E3/UL (ref 0–1.3)
MONOCYTES NFR BLD AUTO: 4 %
NEUTROPHILS # BLD AUTO: 5 10E3/UL (ref 1.6–8.3)
NEUTROPHILS NFR BLD AUTO: 57 %
NRBC # BLD AUTO: 0 10E3/UL
NRBC BLD AUTO-RTO: 0 /100
PLATELET # BLD AUTO: 268 10E3/UL (ref 150–450)
POTASSIUM BLD-SCNC: 3.6 MMOL/L (ref 3.4–5.3)
PROT SERPL-MCNC: 7.5 G/DL (ref 6.8–8.8)
RBC # BLD AUTO: 4.58 10E6/UL (ref 3.8–5.2)
SODIUM SERPL-SCNC: 140 MMOL/L (ref 133–144)
WBC # BLD AUTO: 8.7 10E3/UL (ref 4–11)

## 2022-01-16 PROCEDURE — 80053 COMPREHEN METABOLIC PANEL: CPT | Performed by: STUDENT IN AN ORGANIZED HEALTH CARE EDUCATION/TRAINING PROGRAM

## 2022-01-16 PROCEDURE — 99284 EMERGENCY DEPT VISIT MOD MDM: CPT | Performed by: STUDENT IN AN ORGANIZED HEALTH CARE EDUCATION/TRAINING PROGRAM

## 2022-01-16 PROCEDURE — 99284 EMERGENCY DEPT VISIT MOD MDM: CPT | Mod: 25

## 2022-01-16 PROCEDURE — 36415 COLL VENOUS BLD VENIPUNCTURE: CPT | Performed by: STUDENT IN AN ORGANIZED HEALTH CARE EDUCATION/TRAINING PROGRAM

## 2022-01-16 PROCEDURE — 96374 THER/PROPH/DIAG INJ IV PUSH: CPT

## 2022-01-16 PROCEDURE — 85025 COMPLETE CBC W/AUTO DIFF WBC: CPT | Performed by: STUDENT IN AN ORGANIZED HEALTH CARE EDUCATION/TRAINING PROGRAM

## 2022-01-16 PROCEDURE — 76705 ECHO EXAM OF ABDOMEN: CPT

## 2022-01-16 PROCEDURE — 83605 ASSAY OF LACTIC ACID: CPT | Performed by: STUDENT IN AN ORGANIZED HEALTH CARE EDUCATION/TRAINING PROGRAM

## 2022-01-16 PROCEDURE — 86140 C-REACTIVE PROTEIN: CPT | Performed by: STUDENT IN AN ORGANIZED HEALTH CARE EDUCATION/TRAINING PROGRAM

## 2022-01-16 PROCEDURE — 84702 CHORIONIC GONADOTROPIN TEST: CPT | Performed by: STUDENT IN AN ORGANIZED HEALTH CARE EDUCATION/TRAINING PROGRAM

## 2022-01-16 PROCEDURE — 250N000011 HC RX IP 250 OP 636: Performed by: STUDENT IN AN ORGANIZED HEALTH CARE EDUCATION/TRAINING PROGRAM

## 2022-01-16 RX ORDER — OLANZAPINE 2.5 MG/1
TABLET, FILM COATED ORAL DAILY PRN
COMMUNITY
Start: 2021-12-22 | End: 2022-07-28

## 2022-01-16 RX ORDER — VENLAFAXINE HYDROCHLORIDE 75 MG/1
3 CAPSULE, EXTENDED RELEASE ORAL DAILY
COMMUNITY
Start: 2020-07-01 | End: 2022-09-19

## 2022-01-16 RX ORDER — ONDANSETRON 2 MG/ML
4 INJECTION INTRAMUSCULAR; INTRAVENOUS ONCE
Status: COMPLETED | OUTPATIENT
Start: 2022-01-16 | End: 2022-01-16

## 2022-01-16 RX ORDER — PROCHLORPERAZINE MALEATE 10 MG
10 TABLET ORAL EVERY 6 HOURS PRN
COMMUNITY
Start: 2021-11-17 | End: 2023-07-14

## 2022-01-16 RX ADMIN — ONDANSETRON 4 MG: 2 INJECTION INTRAMUSCULAR; INTRAVENOUS at 19:25

## 2022-01-16 ASSESSMENT — ENCOUNTER SYMPTOMS
HEADACHES: 0
NAUSEA: 1
VOMITING: 1
ABDOMINAL PAIN: 1
NECK PAIN: 0
CHILLS: 0
SHORTNESS OF BREATH: 0
DYSURIA: 0
FEVER: 0
SORE THROAT: 0
WOUND: 0
DIZZINESS: 0
DIARRHEA: 0
BACK PAIN: 0
FLANK PAIN: 0
CONSTIPATION: 0
WEAKNESS: 0
NUMBNESS: 0
EYE REDNESS: 0
HEMATURIA: 0
RHINORRHEA: 0
FACIAL SWELLING: 0
WHEEZING: 0
EYE PAIN: 0
EYE DISCHARGE: 0

## 2022-01-17 NOTE — ED NOTES
Patient ready for discharge, called cab for transport. Sabine Salinas RN on 1/16/2022 at 9:31 PM

## 2022-01-17 NOTE — ED NOTES
Bed: ED20  Expected date: 1/16/22  Expected time: 5:52 PM  Means of arrival: Ambulance  Comments:  [address] 18yo female, nausea vomiting,

## 2022-01-17 NOTE — ED PROVIDER NOTES
"     [facility] [place] [facility] ([facility])  1/16/22    History     Chief Complaint   Patient presents with     Nausea & Vomiting     abdominal pain and N&V for couple of weeks.     HPI  Sadaf Ross is a 21 year old female with PMH significant for history of bipolar 1 disorder, depression, ADHD, intellectual disability, chronic suicidal ideation, and prior suicide attempt who presents to the Emergency Department for evaluation of nausea and vomiting for the past couple weeks. She states she also has associated periumbilical abdominal pain. Patient states that the worsened recently. Patient rates the pain as an 8/10. Patient denies any similar prior incidents. She denies any other symptoms. Patient indicates she is allergic to penicillin.    Per review of the chart, patient visited [facility] ED on 1/8 for behavioral evaluation in context of being seen earlier in the day in the ED with complaints of nausea, vomiting, and suicidal ideation. Following the first visit, patient had labs done as well as CT abdomen pelvis as resulted below. Patient was then discharged back to group home. Patient then had an episode of emesis after returning to the group home and became acutely suicidal and voiced a plan to kill herself via cutting her throat. Per DEC assessment, patient's group home states that patient reports vomiting, but refuses to show her emesis. Patient's group home staff reported that she likes to go on ambulance transfer phone. When DEC  asked the patient if she likes to go on ambulance rides she states that \"Yeah, I do!\"  Patient is currently her own guardian.  Patient was discharged to group home with plan to possibly pursue guardianship of the patient.    CT ABDOMEN PELVIS W CONTRAST 1/8/2022 12:18 PM                                                     IMPRESSION:   1.  No acute pathology in the abdomen or pelvis.  2.  Hepatic steatosis.  3.  Mild splenomegaly.  4.  Cholelithiasis without CT " evidence of acute cholecystitis.    Past Medical History  Past Medical History:   Diagnosis Date     ADHD (attention deficit hyperactivity disorder)      Bipolar 1 disorder (H)      Borderline personality disorder (H)      Depression      Depressive disorder      Intellectual disability      Obesity      Syncope      Past Surgical History:   Procedure Laterality Date     APPENDECTOMY       APPENDECTOMY       ARIPiprazole ER (ABILIFY MAINTENA) 400 MG extended release inj syringe  benztropine (COGENTIN) 0.5 MG tablet  calcium carbonate (TUMS) 500 MG chewable tablet  chlorhexidine (CHLORHEXIDINE) 0.12 % solution  docusate sodium (COLACE) 100 MG capsule  hydrOXYzine (ATARAX) 50 MG tablet  metoclopramide (REGLAN) 10 MG tablet  naltrexone (DEPADE/REVIA) 50 MG tablet  norgestimate-ethinyl estradiol (SPRINTEC 28) 0.25-35 MG-MCG tablet  OLANZapine zydis (ZYPREXA) 5 MG ODT  omeprazole (PRILOSEC) 40 MG DR capsule  ondansetron (ZOFRAN) 4 MG tablet  polyethylene glycol (MIRALAX) 17 g packet  prochlorperazine (COMPAZINE) 10 MG tablet  venlafaxine (EFFEXOR-XR) 150 MG 24 hr capsule  Vitamin D, Cholecalciferol, 25 MCG (1000 UT) TABS  OLANZapine (ZYPREXA) 2.5 MG tablet      Allergies   Allergen Reactions     Penicillins Rash and Unknown     Past medical history, past surgical history, medications, and allergies were reviewed with the patient. Additional pertinent items: None    Family History  Family History   Problem Relation Age of Onset     Diabetes Type 1 Father      Cancer Paternal Grandfather      Family history was reviewed with the patient. Additional pertinent items: None    Social History  Social History     Tobacco Use     Smoking status: Current Some Day Smoker     Packs/day: 1.00     Years: 5.00     Pack years: 5.00     Types: Cigarettes     Smokeless tobacco: Never Used   Substance Use Topics     Alcohol use: No     Drug use: No      Social history was reviewed with the patient. Additional pertinent items: None       Review of Systems   Constitutional: Negative for chills and fever.   HENT: Negative for ear pain, facial swelling, rhinorrhea and sore throat.    Eyes: Negative for pain, discharge and redness.   Respiratory: Negative for shortness of breath and wheezing.    Cardiovascular: Negative for chest pain.   Gastrointestinal: Positive for abdominal pain, nausea and vomiting. Negative for constipation and diarrhea.   Genitourinary: Negative for dysuria, flank pain, hematuria, vaginal bleeding and vaginal discharge.   Musculoskeletal: Negative for back pain and neck pain.   Skin: Negative for rash and wound.   Neurological: Negative for dizziness, weakness, numbness and headaches.     A complete review of systems was performed with pertinent positives and negatives noted in the HPI, and all other systems negative.    Physical Exam   BP: 136/87  Pulse: 79  Temp: 98.1  F (36.7  C)  Resp: 16  SpO2: 100 %  Physical Exam  Constitutional:       General: She is not in acute distress.     Appearance: Normal appearance. She is not diaphoretic.   HENT:      Head: Normocephalic and atraumatic.      Nose: Nose normal.      Mouth/Throat:      Mouth: Mucous membranes are moist.      Pharynx: Oropharynx is clear. No oropharyngeal exudate.   Eyes:      General: Lids are normal. No scleral icterus.     Extraocular Movements: Extraocular movements intact.      Conjunctiva/sclera: Conjunctivae normal.      Pupils: Pupils are equal, round, and reactive to light.   Cardiovascular:      Rate and Rhythm: Normal rate and regular rhythm.      Pulses: Normal pulses.      Heart sounds: Normal heart sounds. No murmur heard.  No friction rub. No gallop.    Pulmonary:      Effort: Pulmonary effort is normal. No respiratory distress.      Breath sounds: Normal breath sounds. No stridor. No wheezing, rhonchi or rales.   Abdominal:      General: Bowel sounds are normal.      Palpations: Abdomen is soft.      Tenderness: There is abdominal tenderness in the  periumbilical area. There is no guarding or rebound.   Musculoskeletal:         General: No tenderness. Normal range of motion.      Cervical back: Full passive range of motion without pain, normal range of motion and neck supple.   Skin:     General: Skin is warm and dry.      Capillary Refill: Capillary refill takes less than 2 seconds.      Findings: No rash.   Neurological:      General: No focal deficit present.      Mental Status: She is oriented to person, place, and time. Mental status is at baseline.      GCS: GCS eye subscore is 4. GCS verbal subscore is 5. GCS motor subscore is 6.   Psychiatric:         Attention and Perception: Attention normal.         Mood and Affect: Mood normal.         Speech: Speech normal.         Behavior: Behavior normal.       ED Course      Procedures   7:07 PM  The patient was seen and examined by Emir Sosa MD in Room ED20.        Mental Health Risk Assessment      PSS-3    Date and Time Over the past 2 weeks have you felt down, depressed, or hopeless? Over the past 2 weeks have you had thoughts of killing yourself? Have you ever attempted to kill yourself? When did this last happen? User   01/16/22 1814 yes yes yes within the last 24 hours (including today) TL                   Results for orders placed or performed during the hospital encounter of 01/16/22   US Abdomen Limited (RUQ)     Status: None    Narrative    EXAM: US ABDOMEN LIMITED  LOCATION: [facility]  DATE/TIME: 1/16/2022 7:54 PM    INDICATION: Abdominal pain nausea and vomiting  COMPARISON: CT 01/08/2022  TECHNIQUE: Limited abdominal ultrasound.    FINDINGS:    GALLBLADDER: Contracted with secondarily thickened wall up to 4 mm otherwise normal. No gallstones or sludge. Negative sonographic Santana's.    BILE DUCTS: No biliary dilatation. The common duct measures 3 mm.    LIVER: Hepatic steatosis with much obscured from poor penetration of the ultrasound  beam. No mass identified.    RIGHT KIDNEY: No hydronephrosis.    PANCREAS: The visualized portions are normal.    No ascites.      Impression    IMPRESSION:  1.  Hepatic steatosis.  2.  Gallbladder contracted with secondarily mildly thickened wall with no gallstones or sludge.       Comprehensive metabolic panel     Status: Abnormal   Result Value Ref Range    Sodium 140 133 - 144 mmol/L    Potassium 3.6 3.4 - 5.3 mmol/L    Chloride 108 94 - 109 mmol/L    Carbon Dioxide (CO2) 26 20 - 32 mmol/L    Anion Gap 6 3 - 14 mmol/L    Urea Nitrogen 14 7 - 30 mg/dL    Creatinine 0.78 0.52 - 1.04 mg/dL    Calcium 8.8 8.5 - 10.1 mg/dL    Glucose 100 (H) 70 - 99 mg/dL    Alkaline Phosphatase 57 40 - 150 U/L    AST 18 0 - 45 U/L    ALT 29 0 - 50 U/L    Protein Total 7.5 6.8 - 8.8 g/dL    Albumin 3.7 3.4 - 5.0 g/dL    Bilirubin Total 0.2 0.2 - 1.3 mg/dL    GFR Estimate >90 >60 mL/min/1.73m2   Lactic acid whole blood     Status: Normal   Result Value Ref Range    Lactic Acid 1.1 0.7 - 2.0 mmol/L   HCG quantitative pregnancy     Status: Normal   Result Value Ref Range    hCG Quantitative <1 0 - 5 IU/L   CRP inflammation     Status: Normal   Result Value Ref Range    CRP Inflammation 4.9 0.0 - 8.0 mg/L   CBC with platelets and differential     Status: None   Result Value Ref Range    WBC Count 8.7 4.0 - 11.0 10e3/uL    RBC Count 4.58 3.80 - 5.20 10e6/uL    Hemoglobin 12.8 11.7 - 15.7 g/dL    Hematocrit 38.4 35.0 - 47.0 %    MCV 84 78 - 100 fL    MCH 27.9 26.5 - 33.0 pg    MCHC 33.3 31.5 - 36.5 g/dL    RDW 12.8 10.0 - 15.0 %    Platelet Count 268 150 - 450 10e3/uL    % Neutrophils 57 %    % Lymphocytes 34 %    % Monocytes 4 %    % Eosinophils 3 %    % Basophils 1 %    % Immature Granulocytes 1 %    NRBCs per 100 WBC 0 <1 /100    Absolute Neutrophils 5.0 1.6 - 8.3 10e3/uL    Absolute Lymphocytes 3.0 0.8 - 5.3 10e3/uL    Absolute Monocytes 0.4 0.0 - 1.3 10e3/uL    Absolute Eosinophils 0.3 0.0 - 0.7 10e3/uL    Absolute Basophils 0.1 0.0 -  0.2 10e3/uL    Absolute Immature Granulocytes 0.0 <=0.4 10e3/uL    Absolute NRBCs 0.0 10e3/uL   CBC with platelets differential     Status: None    Narrative    The following orders were created for panel order CBC with platelets differential.  Procedure                               Abnormality         Status                     ---------                               -----------         ------                     CBC with platelets and d...[[id number]]                      Final result                 Please view results for these tests on the individual orders.     Medications   HYDROmorphone (DILAUDID) injection 1 mg (1 mg Intravenous Not Given 1/16/22 1930)   ondansetron (ZOFRAN) injection 4 mg (4 mg Intravenous Given 1/16/22 1925)        Assessments & Plan (with Medical Decision Making)   This is a 21 year old lady who presents to the ED for abdominal pain. Given her history and examination my suspicion is that this pain is secondary to symptomatic cholelithiasis. Other possible etiologies include appendicitis, cholecystitis, PUD, constipation, pancreatitis, diverticulitis, bowel obstruction or perforation, mesenteric ischemia, kidney stone, AAA, ovarian torsion, ovarian cyst or cyst rupture.   Evaluation and management will include CBC, CMP, beta-hcg, CRP, lactate, right upper quadrant ultrasound, as well as symptom management. Disposition pending results and patient course.     Blood work is over reassuring, noted leukocytosis, no noted anemia, normal renal function, no acute electrode abnormalities or acute reaction, no elevation acute fracture markers, reassuring for infectious process, patient not pregnant.    Right upper quadrant ultrasound shows no signs of Simran lithiasis, no evidence of acute cholecystitis, blood work is also reassuring in this regard.    Overall patient's work-up is benign, I will have her follow-up with primary care.    The patient's workup and evaluation during their Emergency  Department stay was reviewed with the patient. She is comfortable going home based on our discussion with her. They are amenable to this plan. They will follow up with PCP in 3 days time. The signs/symptoms to prompt return to the Emergency Department were discussed with the patient and they expressed understanding. All questions were answered.       I have reviewed the nursing notes. I have reviewed the findings, diagnosis, plan and need for follow up with the patient.    New Prescriptions    No medications on file       Final diagnoses:   Abdominal pain, right upper quadrant     I, Murray Zaman, am serving as a trained medical scribe to document services personally performed by Emir Sosa MD, based on the provider's statements to me.     I, Emir Sosa MD, was physically present and have reviewed and verified the accuracy of this note documented by Murray Zaman.    --  Emir Sosa MD  [facility] EMERGENCY DEPARTMENT  1/16/2022     Emir Sosa MD  01/16/22 2021

## 2022-01-17 NOTE — ED NOTES
Safety search conducted by this writer per site's protocol. Belonging bags (x2) stored in locker. Cell phone remains with patient.

## 2022-01-18 ENCOUNTER — HOSPITAL ENCOUNTER (EMERGENCY)
Facility: CLINIC | Age: 22
Discharge: HOME | End: 2022-01-18
Attending: EMERGENCY MEDICINE | Admitting: EMERGENCY MEDICINE
Payer: MEDICARE

## 2022-01-18 VITALS
DIASTOLIC BLOOD PRESSURE: 98 MMHG | OXYGEN SATURATION: 100 % | RESPIRATION RATE: 16 BRPM | SYSTOLIC BLOOD PRESSURE: 131 MMHG | BODY MASS INDEX: 40.78 KG/M2 | HEART RATE: 90 BPM | TEMPERATURE: 98.1 F | WEIGHT: 237.6 LBS

## 2022-01-18 DIAGNOSIS — R11.2 NON-INTRACTABLE VOMITING WITH NAUSEA, UNSPECIFIED VOMITING TYPE: ICD-10-CM

## 2022-01-18 PROBLEM — M25.562 ACUTE PAIN OF LEFT KNEE: Status: RESOLVED | Noted: 2021-11-16 | Resolved: 2022-01-18

## 2022-01-18 LAB
ALBUMIN SERPL-MCNC: 3.6 G/DL (ref 3.4–5)
ALBUMIN UR-MCNC: NEGATIVE MG/DL
ALP SERPL-CCNC: 55 U/L (ref 40–150)
ALT SERPL W P-5'-P-CCNC: 35 U/L (ref 0–50)
ANION GAP SERPL CALCULATED.3IONS-SCNC: 6 MMOL/L (ref 3–14)
APPEARANCE UR: CLEAR
AST SERPL W P-5'-P-CCNC: 18 U/L (ref 0–45)
BASOPHILS # BLD AUTO: 0.1 10E3/UL (ref 0–0.2)
BASOPHILS NFR BLD AUTO: 1 %
BILIRUB SERPL-MCNC: 0.2 MG/DL (ref 0.2–1.3)
BILIRUB UR QL STRIP: NEGATIVE
BUN SERPL-MCNC: 14 MG/DL (ref 7–30)
CALCIUM SERPL-MCNC: 8.9 MG/DL (ref 8.5–10.1)
CHLORIDE BLD-SCNC: 108 MMOL/L (ref 94–109)
CO2 SERPL-SCNC: 25 MMOL/L (ref 20–32)
COLOR UR AUTO: ABNORMAL
CREAT SERPL-MCNC: 0.68 MG/DL (ref 0.52–1.04)
EOSINOPHIL # BLD AUTO: 0.2 10E3/UL (ref 0–0.7)
EOSINOPHIL NFR BLD AUTO: 3 %
ERYTHROCYTE [DISTWIDTH] IN BLOOD BY AUTOMATED COUNT: 13.2 % (ref 10–15)
GFR SERPL CREATININE-BSD FRML MDRD: >90 ML/MIN/1.73M2
GLUCOSE BLD-MCNC: 106 MG/DL (ref 70–99)
GLUCOSE UR STRIP-MCNC: NEGATIVE MG/DL
HCG UR QL: NEGATIVE
HCT VFR BLD AUTO: 36.3 % (ref 35–47)
HGB BLD-MCNC: 12.1 G/DL (ref 11.7–15.7)
HGB UR QL STRIP: NEGATIVE
IMM GRANULOCYTES # BLD: 0 10E3/UL
IMM GRANULOCYTES NFR BLD: 0 %
KETONES UR STRIP-MCNC: NEGATIVE MG/DL
LEUKOCYTE ESTERASE UR QL STRIP: ABNORMAL
LYMPHOCYTES # BLD AUTO: 3.2 10E3/UL (ref 0.8–5.3)
LYMPHOCYTES NFR BLD AUTO: 36 %
MCH RBC QN AUTO: 28.3 PG (ref 26.5–33)
MCHC RBC AUTO-ENTMCNC: 33.3 G/DL (ref 31.5–36.5)
MCV RBC AUTO: 85 FL (ref 78–100)
MONOCYTES # BLD AUTO: 0.7 10E3/UL (ref 0–1.3)
MONOCYTES NFR BLD AUTO: 8 %
MUCOUS THREADS #/AREA URNS LPF: PRESENT /LPF
NEUTROPHILS # BLD AUTO: 4.7 10E3/UL (ref 1.6–8.3)
NEUTROPHILS NFR BLD AUTO: 52 %
NITRATE UR QL: NEGATIVE
NRBC # BLD AUTO: 0 10E3/UL
NRBC BLD AUTO-RTO: 0 /100
PH UR STRIP: 5.5 [PH] (ref 5–7)
PLATELET # BLD AUTO: 256 10E3/UL (ref 150–450)
POTASSIUM BLD-SCNC: 3.4 MMOL/L (ref 3.4–5.3)
PROT SERPL-MCNC: 7.4 G/DL (ref 6.8–8.8)
RBC # BLD AUTO: 4.28 10E6/UL (ref 3.8–5.2)
RBC URINE: 1 /HPF
SODIUM SERPL-SCNC: 139 MMOL/L (ref 133–144)
SP GR UR STRIP: 1.01 (ref 1–1.03)
SQUAMOUS EPITHELIAL: 1 /HPF
UROBILINOGEN UR STRIP-MCNC: NORMAL MG/DL
WBC # BLD AUTO: 8.9 10E3/UL (ref 4–11)
WBC URINE: <1 /HPF

## 2022-01-18 PROCEDURE — 36415 COLL VENOUS BLD VENIPUNCTURE: CPT | Performed by: EMERGENCY MEDICINE

## 2022-01-18 PROCEDURE — 99284 EMERGENCY DEPT VISIT MOD MDM: CPT | Performed by: EMERGENCY MEDICINE

## 2022-01-18 PROCEDURE — 84155 ASSAY OF PROTEIN SERUM: CPT | Performed by: EMERGENCY MEDICINE

## 2022-01-18 PROCEDURE — 81001 URINALYSIS AUTO W/SCOPE: CPT | Performed by: EMERGENCY MEDICINE

## 2022-01-18 PROCEDURE — 85025 COMPLETE CBC W/AUTO DIFF WBC: CPT | Performed by: EMERGENCY MEDICINE

## 2022-01-18 PROCEDURE — 99283 EMERGENCY DEPT VISIT LOW MDM: CPT | Performed by: EMERGENCY MEDICINE

## 2022-01-18 PROCEDURE — 250N000011 HC RX IP 250 OP 636: Performed by: EMERGENCY MEDICINE

## 2022-01-18 PROCEDURE — 81025 URINE PREGNANCY TEST: CPT | Performed by: EMERGENCY MEDICINE

## 2022-01-18 PROCEDURE — 84450 TRANSFERASE (AST) (SGOT): CPT | Performed by: EMERGENCY MEDICINE

## 2022-01-18 RX ORDER — ONDANSETRON 4 MG/1
4 TABLET, ORALLY DISINTEGRATING ORAL ONCE
Status: COMPLETED | OUTPATIENT
Start: 2022-01-18 | End: 2022-01-18

## 2022-01-18 RX ORDER — PROMETHAZINE HYDROCHLORIDE 25 MG/1
25 SUPPOSITORY RECTAL EVERY 6 HOURS PRN
Qty: 10 SUPPOSITORY | Refills: 0 | Status: SHIPPED | OUTPATIENT
Start: 2022-01-18 | End: 2022-07-28

## 2022-01-18 RX ADMIN — ONDANSETRON 4 MG: 4 TABLET, ORALLY DISINTEGRATING ORAL at 05:12

## 2022-01-18 ASSESSMENT — ENCOUNTER SYMPTOMS
DIARRHEA: 0
FEVER: 0
CONSTIPATION: 0
DIZZINESS: 0
DIFFICULTY URINATING: 0
COLOR CHANGE: 0
ABDOMINAL PAIN: 0
PALPITATIONS: 0
WEAKNESS: 0
EYE PAIN: 0
ABDOMINAL DISTENTION: 0
CHILLS: 0
COUGH: 0
SORE THROAT: 0
FREQUENCY: 0
HEADACHES: 0
MYALGIAS: 0
NAUSEA: 1
DYSURIA: 0
ARTHRALGIAS: 0
VOMITING: 1
CHEST TIGHTNESS: 0
NECK PAIN: 0
SHORTNESS OF BREATH: 0
FATIGUE: 0
CONFUSION: 0
BACK PAIN: 0

## 2022-01-18 NOTE — ED NOTES
Patient on camera observation for suicide precautions. Charge nurse aware. Sabine Salinas RN on 1/18/2022 at 5:10 AM

## 2022-01-18 NOTE — ED NOTES
Report given to Penelope, caregiver at [facility] Arranging transport for patient discharge at this time .Sabine Salinas RN on 1/18/2022 at 6:19 AM

## 2022-01-18 NOTE — ED NOTES
Bed: ED03  Expected date:   Expected time:   Means of arrival:   Comments:  Fadi [address]  21 F  Nausea

## 2022-01-18 NOTE — ED PROVIDER NOTES
ED Provider Note  [facility]      History     Chief Complaint   Patient presents with     Nausea & Vomiting     HPI  Sadaf Ross is a 21 year old female with history of bipolar 1 disorder, depression, ADHD, intellectual disability, chronic suicidal ideation, prior suicide attempts, presents to the ED from residential for evaluation of nausea and vomiting.  She states she has had the symptoms for the past 3 weeks.  She was prescribed Zofran but did not take it.  She was worried that she might throw it up.  She states she has vomited more than 10 times over the past 2 days.  She has discomfort throughout her entire abdomen.  No focal pain.  Symptoms been constant since onset.  Nothing makes better or worse.  Denies sick contacts, recent travel.  Denies fever/chills, chest pain, shortness of breath, diarrhea/constipation, melena/hematochezia, dysuria, has no other medical complaints.  She has chronic suicidal thoughts but no worsening thoughts or active plan.    Past Medical History  Past Medical History:   Diagnosis Date     ADHD (attention deficit hyperactivity disorder)      Bipolar 1 disorder (H)      Borderline personality disorder (H)      Depression      Depressive disorder      Intellectual disability      Obesity      Syncope      Past Surgical History:   Procedure Laterality Date     APPENDECTOMY       APPENDECTOMY       ARIPiprazole ER (ABILIFY MAINTENA) 400 MG extended release inj syringe  calcium carbonate (TUMS) 500 MG chewable tablet  hydrOXYzine (ATARAX) 50 MG tablet  metoclopramide (REGLAN) 10 MG tablet  OLANZapine (ZYPREXA) 2.5 MG tablet  OLANZapine zydis (ZYPREXA) 5 MG ODT  omeprazole (PRILOSEC) 40 MG DR capsule  ondansetron (ZOFRAN) 4 MG tablet  prochlorperazine (COMPAZINE) 10 MG tablet  promethazine (PHENERGAN) 25 MG suppository  venlafaxine (EFFEXOR-XR) 150 MG 24 hr capsule  benztropine (COGENTIN) 0.5 MG tablet  chlorhexidine (CHLORHEXIDINE) 0.12 %  solution  docusate sodium (COLACE) 100 MG capsule  naltrexone (DEPADE/REVIA) 50 MG tablet  norgestimate-ethinyl estradiol (SPRINTEC 28) 0.25-35 MG-MCG tablet  polyethylene glycol (MIRALAX) 17 g packet  Vitamin D, Cholecalciferol, 25 MCG (1000 UT) TABS      Allergies   Allergen Reactions     Penicillins Rash and Unknown     Family History  Family History   Problem Relation Age of Onset     Diabetes Type 1 Father      Cancer Paternal Grandfather      Social History   Social History     Tobacco Use     Smoking status: Current Some Day Smoker     Packs/day: 1.00     Years: 5.00     Pack years: 5.00     Types: Cigarettes     Smokeless tobacco: Never Used   Substance Use Topics     Alcohol use: No     Drug use: No      Past medical history, past surgical history, medications, allergies, family history, and social history were reviewed with the patient. No additional pertinent items.       Review of Systems   Constitutional: Negative for chills, fatigue and fever.   HENT: Negative for congestion and sore throat.    Eyes: Negative for pain and visual disturbance.   Respiratory: Negative for cough, chest tightness and shortness of breath.    Cardiovascular: Negative for chest pain and palpitations.   Gastrointestinal: Positive for nausea and vomiting. Negative for abdominal distention, abdominal pain, constipation and diarrhea.   Genitourinary: Negative for difficulty urinating, dysuria, frequency and urgency.   Musculoskeletal: Negative for arthralgias, back pain, myalgias and neck pain.   Skin: Negative for color change and rash.   Neurological: Negative for dizziness, weakness and headaches.   Psychiatric/Behavioral: Negative for confusion.     A complete review of systems was performed with pertinent positives and negatives noted in the HPI, and all other systems negative.    Physical Exam   BP: (!) 132/92  Pulse: 72  Temp: 98.1  F (36.7  C)  Resp: 16  Weight: 107.8 kg (237 lb 9.6 oz)  SpO2: 100 %  Physical Exam  Vitals  and nursing note reviewed.   Constitutional:       General: She is not in acute distress.     Appearance: Normal appearance. She is not ill-appearing or toxic-appearing.   HENT:      Head: Normocephalic and atraumatic.      Nose: Nose normal.      Mouth/Throat:      Mouth: Mucous membranes are moist.   Eyes:      Pupils: Pupils are equal, round, and reactive to light.   Cardiovascular:      Rate and Rhythm: Normal rate.      Pulses: Normal pulses.      Heart sounds: Normal heart sounds.   Pulmonary:      Effort: Pulmonary effort is normal. No respiratory distress.      Breath sounds: Normal breath sounds.   Abdominal:      General: Abdomen is flat. There is no distension.      Palpations: Abdomen is soft.      Tenderness: There is no abdominal tenderness. There is no right CVA tenderness, left CVA tenderness or guarding.   Musculoskeletal:         General: No swelling or deformity. Normal range of motion.      Cervical back: Normal range of motion. No rigidity.   Skin:     General: Skin is warm.      Capillary Refill: Capillary refill takes less than 2 seconds.   Neurological:      Mental Status: She is alert and oriented to person, place, and time.   Psychiatric:         Mood and Affect: Mood normal.         ED Course      Procedures       The medical record was reviewed and interpreted.  Current labs reviewed and interpreted.  Previous labs reviewed and interpreted.  Managed outpatient prescription medications.      Item Assessment   Suicidal Ideation Suicidal thoughts   Plan None   Intent None   Suicidal or self-harm behaviors None   Risk Factors No new risk factors   Protective Factors Lives in monitored settings, unchanged chronic feelings              Results for orders placed or performed during the hospital encounter of 01/18/22   Comprehensive metabolic panel     Status: Abnormal   Result Value Ref Range    Sodium 139 133 - 144 mmol/L    Potassium 3.4 3.4 - 5.3 mmol/L    Chloride 108 94 - 109 mmol/L     Carbon Dioxide (CO2) 25 20 - 32 mmol/L    Anion Gap 6 3 - 14 mmol/L    Urea Nitrogen 14 7 - 30 mg/dL    Creatinine 0.68 0.52 - 1.04 mg/dL    Calcium 8.9 8.5 - 10.1 mg/dL    Glucose 106 (H) 70 - 99 mg/dL    Alkaline Phosphatase 55 40 - 150 U/L    AST 18 0 - 45 U/L    ALT 35 0 - 50 U/L    Protein Total 7.4 6.8 - 8.8 g/dL    Albumin 3.6 3.4 - 5.0 g/dL    Bilirubin Total 0.2 0.2 - 1.3 mg/dL    GFR Estimate >90 >60 mL/min/1.73m2   UA with Microscopic reflex to Culture     Status: Abnormal    Specimen: Urine, Clean Catch   Result Value Ref Range    Color Urine Straw Colorless, Straw, Light Yellow, Yellow    Appearance Urine Clear Clear    Glucose Urine Negative Negative mg/dL    Bilirubin Urine Negative Negative    Ketones Urine Negative Negative mg/dL    Specific Gravity Urine 1.011 1.003 - 1.035    Blood Urine Negative Negative    pH Urine 5.5 5.0 - 7.0    Protein Albumin Urine Negative Negative mg/dL    Urobilinogen Urine Normal Normal, 2.0 mg/dL    Nitrite Urine Negative Negative    Leukocyte Esterase Urine Trace (A) Negative    Mucus Urine Present (A) None Seen /LPF    RBC Urine 1 <=2 /HPF    WBC Urine <1 <=5 /HPF    Squamous Epithelials Urine 1 <=1 /HPF    Narrative    Urine Culture not indicated   HCG qualitative urine (UPT)     Status: Normal   Result Value Ref Range    hCG Urine Qualitative Negative Negative   CBC with platelets and differential     Status: None   Result Value Ref Range    WBC Count 8.9 4.0 - 11.0 10e3/uL    RBC Count 4.28 3.80 - 5.20 10e6/uL    Hemoglobin 12.1 11.7 - 15.7 g/dL    Hematocrit 36.3 35.0 - 47.0 %    MCV 85 78 - 100 fL    MCH 28.3 26.5 - 33.0 pg    MCHC 33.3 31.5 - 36.5 g/dL    RDW 13.2 10.0 - 15.0 %    Platelet Count 256 150 - 450 10e3/uL    % Neutrophils 52 %    % Lymphocytes 36 %    % Monocytes 8 %    % Eosinophils 3 %    % Basophils 1 %    % Immature Granulocytes 0 %    NRBCs per 100 WBC 0 <1 /100    Absolute Neutrophils 4.7 1.6 - 8.3 10e3/uL    Absolute Lymphocytes 3.2 0.8 - 5.3  10e3/uL    Absolute Monocytes 0.7 0.0 - 1.3 10e3/uL    Absolute Eosinophils 0.2 0.0 - 0.7 10e3/uL    Absolute Basophils 0.1 0.0 - 0.2 10e3/uL    Absolute Immature Granulocytes 0.0 <=0.4 10e3/uL    Absolute NRBCs 0.0 10e3/uL   CBC with platelets differential     Status: None    Narrative    The following orders were created for panel order CBC with platelets differential.  Procedure                               Abnormality         Status                     ---------                               -----------         ------                     CBC with platelets and d...[[id number]]                      Final result                 Please view results for these tests on the individual orders.     Medications   ondansetron (ZOFRAN-ODT) ODT tab 4 mg (4 mg Oral Given 1/18/22 0512)        Assessments & Plan (with Medical Decision Making)   Patient presents to the ED for evaluation of nausea and vomiting.  Said the symptoms for the past 3 weeks.  Has received multiple ED evaluations.  He is prescribed nausea medication but did not take them today.    On arrival, patient is normal vital signs.  She overall appears well.  Abdomen is soft and nontender.  She reports no focal abdominal pain.  She has not had any vomiting while in the ED.    Plan for basic labs, oral zofran    Laboratory work-up is overall reassuring.  No electrolyte abnormality.  No significant dehydration.  No anemia.  No leukocytosis.  Remains afebrile.  Reassuring for infectious etiology.  No bilirubin elevation, transaminitis to suggest hepatobiliary pathology.    On reassessment, patient's symptoms of nausea feel better.  She has not had any vomiting while in the ED.  No indication for IV fluids, imaging, or further work-up.  Patient discharged back to group home in good condition.  She has oral Zofran at home, she was counseled on when it is appropriate to use this medication.  She was also given Phenergan suppositories should she feel too nauseous to  take oral medications.    I have reviewed the nursing notes. I have reviewed the findings, diagnosis, plan and need for follow up with the patient.    New Prescriptions    PROMETHAZINE (PHENERGAN) 25 MG SUPPOSITORY    Place 1 suppository (25 mg) rectally every 6 hours as needed for nausea       Final diagnoses:   Non-intractable vomiting with nausea, unspecified vomiting type       --  Ghassan Browning DO  [facility] EMERGENCY DEPARTMENT  1/18/2022     Ghassan Browning DO  01/19/22 0433

## 2022-01-18 NOTE — ED TRIAGE NOTES
Patient reports nausea and vomiting today. Unknown time of last Zofran dose. Denies blood in vomit. Denies fever or diarrhea. Reporting abdominal pain. Sabine Salinas RN on 1/18/2022 at 4:16 AM

## 2022-01-19 ENCOUNTER — PATIENT OUTREACH (OUTPATIENT)
Dept: CARE COORDINATION | Facility: CLINIC | Age: 22
End: 2022-01-19
Payer: MEDICARE

## 2022-01-19 DIAGNOSIS — Z71.89 OTHER SPECIFIED COUNSELING: ICD-10-CM

## 2022-01-19 NOTE — PROGRESS NOTES
Clinic Care Coordination Contact    Background: Care Coordination referral placed from [facility] discharge report for reason of patient meeting criteria for a TCM outreach call by [facility] team.    Assessment: Upon chart review, [facility] Team member will cancel/close the referral for TCM outreach due to reason below:    Patient has discharged to a Group home, Memory Care or Nursing Home    Plan: Care Coordination referral for TCM outreach canceled.    Aury Duffy MA  [facility] Care Resource [place], [facility]

## 2022-01-24 NOTE — DISCHARGE INSTRUCTIONS
If I am feeling unsafe or I am in a crisis, I will:   Contact my established care providers   Call the National Suicide Prevention Lifeline: [phone]   Go to the nearest emergency room   Call 273      Warning signs that I or other people might notice when a crisis is developing for me:   when I start feeling annoyed., angry, or frustrated lack of sleep, arguments with others   thoughts of biting or cutting myself  that I want to die     Things I am able to do on my own to cope or help me feel better: listen to calm music,  going to my room and shutting the door,   smoking a cigarette,   asking a staff member to talk with me      Things that I am able to do with others to cope or help me better:   talk,   go for a walk      Things I can use or do for distraction:   talk to staff        Changes I can make to support my mental health and wellness: Calling the crisis team for support,    talk with my therapist about other ways of coping with overwhelming emotions      People in my life that I can ask for help:    group home staff,   therapist,   Social Workers      Your [facility] has a mental health crisis team you can call 24/7: [facility], [phone]   Detail Level: Zone

## 2022-01-26 ENCOUNTER — HOSPITAL ENCOUNTER (EMERGENCY)
Facility: CLINIC | Age: 22
Discharge: HOME | End: 2022-01-26
Attending: PSYCHIATRY & NEUROLOGY | Admitting: PSYCHIATRY & NEUROLOGY
Payer: MEDICARE

## 2022-01-26 ENCOUNTER — MEDICAL CORRESPONDENCE (OUTPATIENT)
Dept: HEALTH INFORMATION MANAGEMENT | Facility: CLINIC | Age: 22
End: 2022-01-26

## 2022-01-26 VITALS
OXYGEN SATURATION: 100 % | DIASTOLIC BLOOD PRESSURE: 82 MMHG | TEMPERATURE: 98 F | HEART RATE: 79 BPM | RESPIRATION RATE: 18 BRPM | SYSTOLIC BLOOD PRESSURE: 129 MMHG

## 2022-01-26 DIAGNOSIS — F79 INTELLECTUAL DISABILITY: ICD-10-CM

## 2022-01-26 DIAGNOSIS — F43.0 ACUTE REACTION TO STRESS: ICD-10-CM

## 2022-01-26 PROCEDURE — 99284 EMERGENCY DEPT VISIT MOD MDM: CPT | Performed by: PSYCHIATRY & NEUROLOGY

## 2022-01-26 PROCEDURE — 250N000013 HC RX MED GY IP 250 OP 250 PS 637: Performed by: PSYCHIATRY & NEUROLOGY

## 2022-01-26 PROCEDURE — 99283 EMERGENCY DEPT VISIT LOW MDM: CPT | Performed by: PSYCHIATRY & NEUROLOGY

## 2022-01-26 RX ORDER — OLANZAPINE 10 MG/1
10 TABLET, ORALLY DISINTEGRATING ORAL ONCE
Status: COMPLETED | OUTPATIENT
Start: 2022-01-26 | End: 2022-01-26

## 2022-01-26 RX ADMIN — OLANZAPINE 10 MG: 10 TABLET, ORALLY DISINTEGRATING ORAL at 19:23

## 2022-01-26 ASSESSMENT — ENCOUNTER SYMPTOMS
RESPIRATORY NEGATIVE: 1
DECREASED CONCENTRATION: 1
GASTROINTESTINAL NEGATIVE: 1
CARDIOVASCULAR NEGATIVE: 1
NERVOUS/ANXIOUS: 1
EYES NEGATIVE: 1
NEUROLOGICAL NEGATIVE: 1
CONSTITUTIONAL NEGATIVE: 1
HALLUCINATIONS: 0
MUSCULOSKELETAL NEGATIVE: 1
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
## 2022-01-27 ENCOUNTER — NURSE TRIAGE (OUTPATIENT)
Dept: NURSING | Facility: CLINIC | Age: 22
End: 2022-01-27
Payer: MEDICARE

## 2022-01-27 ENCOUNTER — HOSPITAL ENCOUNTER (EMERGENCY)
Facility: CLINIC | Age: 22
Discharge: HOME | End: 2022-01-27
Attending: INTERNAL MEDICINE | Admitting: INTERNAL MEDICINE
Payer: MEDICARE

## 2022-01-27 ENCOUNTER — PATIENT OUTREACH (OUTPATIENT)
Dept: CARE COORDINATION | Facility: CLINIC | Age: 22
End: 2022-01-27
Payer: MEDICARE

## 2022-01-27 VITALS
DIASTOLIC BLOOD PRESSURE: 73 MMHG | RESPIRATION RATE: 18 BRPM | TEMPERATURE: 97.3 F | SYSTOLIC BLOOD PRESSURE: 118 MMHG | HEART RATE: 83 BPM | OXYGEN SATURATION: 100 %

## 2022-01-27 DIAGNOSIS — F60.3 EXPLOSIVE PERSONALITY DISORDER (H): ICD-10-CM

## 2022-01-27 DIAGNOSIS — F32.A DEPRESSION, UNSPECIFIED DEPRESSION TYPE: ICD-10-CM

## 2022-01-27 DIAGNOSIS — Z71.89 OTHER SPECIFIED COUNSELING: ICD-10-CM

## 2022-01-27 DIAGNOSIS — F43.12 PROLONGED POSTTRAUMATIC STRESS DISORDER: ICD-10-CM

## 2022-01-27 DIAGNOSIS — F71 MODERATE INTELLECTUAL DISABILITIES: ICD-10-CM

## 2022-01-27 DIAGNOSIS — F32.9 MAJOR DEPRESSIVE DISORDER, SINGLE EPISODE, UNSPECIFIED: ICD-10-CM

## 2022-01-27 PROCEDURE — 90791 PSYCH DIAGNOSTIC EVALUATION: CPT

## 2022-01-27 PROCEDURE — 99283 EMERGENCY DEPT VISIT LOW MDM: CPT | Performed by: INTERNAL MEDICINE

## 2022-01-27 PROCEDURE — 99285 EMERGENCY DEPT VISIT HI MDM: CPT | Mod: 25

## 2022-01-27 ASSESSMENT — ENCOUNTER SYMPTOMS
DYSPHORIC MOOD: 1
HEADACHES: 0
EYE REDNESS: 0
DIFFICULTY URINATING: 0
ARTHRALGIAS: 0
FEVER: 0
SHORTNESS OF BREATH: 0
CONFUSION: 0
NECK STIFFNESS: 0
COLOR CHANGE: 0
ABDOMINAL PAIN: 0

## 2022-01-27 NOTE — ED NOTES
Pt states she woke up from a nap and had a panic attack and called 911 and then started feeling SI en route.

## 2022-01-27 NOTE — ED NOTES
Pt stated to this writer that she could contract for safety but then stated to the ED provider that she could not contract for safety.  Pt to be kept on a one to one.

## 2022-01-27 NOTE — PROGRESS NOTES
Phone call with group home staff, Arlene. [facility] [phone].  She states that pt woke from a nap and was complaining of high blood pressure and feeling anxious and wanted to come to the ED. Staff wanted her to take her afternoon medications and pt refused, set on coming to the ED and pt called 911 herself.  She did not make any suicidal comments to staff or prior to coming to the ED. Staff again state she was worried about her blood pressure.  Pt is welcome to return to the group [facility].  She can return by medical taxi.

## 2022-01-27 NOTE — ED PROVIDER NOTES
ED Provider Note  [facility]      History     Chief Complaint   Patient presents with     Anxiety     HPI  Sadaf Ross is a 21 year old female who is here as she had called 911 due to feeling distressed after being in court for an assault charge. Group Home staff felt she was coming to the ED as she felt her blood pressure was high and wanted to be checked out. Enroute, patient was also telling EMS that she was feeling suicidal and having urges to injure herself.     Patient is well-known to us due to her frequent ED visits. She has very poor stress tolerance and tends to use the ED to get her needs met. She is chronically suicidal.    Patient was given a dose of Zyprexa on arrival. She now feels tired and is comfortable going back to her group home to sleep in her own bed. Group home staff is comfortable with having her return.    PERSONAL MEDICAL HISTORY  Past Medical History:   Diagnosis Date     ADHD (attention deficit hyperactivity disorder)      Bipolar 1 disorder (H)      Borderline personality disorder (H)      Depression      Depressive disorder      Intellectual disability      Obesity      Syncope      PAST SURGICAL HISTORY  Past Surgical History:   Procedure Laterality Date     APPENDECTOMY       APPENDECTOMY       FAMILY HISTORY  Family History   Problem Relation Age of Onset     Diabetes Type 1 Father      Cancer Paternal Grandfather      SOCIAL HISTORY  Social History     Tobacco Use     Smoking status: Current Some Day Smoker     Packs/day: 1.00     Years: 5.00     Pack years: 5.00     Types: Cigarettes     Smokeless tobacco: Never Used   Substance Use Topics     Alcohol use: No     MEDICATIONS  Current Facility-Administered Medications   Medication     OLANZapine zydis (zyPREXA) ODT tab 10 mg     Current Outpatient Medications   Medication     ARIPiprazole ER (ABILIFY MAINTENA) 400 MG extended release inj syringe     benztropine (COGENTIN) 0.5 MG tablet     calcium  carbonate (TUMS) 500 MG chewable tablet     chlorhexidine (CHLORHEXIDINE) 0.12 % solution     docusate sodium (COLACE) 100 MG capsule     hydrOXYzine (ATARAX) 50 MG tablet     metoclopramide (REGLAN) 10 MG tablet     naltrexone (DEPADE/REVIA) 50 MG tablet     norgestimate-ethinyl estradiol (SPRINTEC 28) 0.25-35 MG-MCG tablet     OLANZapine (ZYPREXA) 2.5 MG tablet     OLANZapine zydis (ZYPREXA) 5 MG ODT     omeprazole (PRILOSEC) 40 MG DR capsule     ondansetron (ZOFRAN) 4 MG tablet     polyethylene glycol (MIRALAX) 17 g packet     prochlorperazine (COMPAZINE) 10 MG tablet     promethazine (PHENERGAN) 25 MG suppository     venlafaxine (EFFEXOR-XR) 150 MG 24 hr capsule     Vitamin D, Cholecalciferol, 25 MCG (1000 UT) TABS     ALLERGIES  Allergies   Allergen Reactions     Penicillins Rash and Unknown          Review of Systems   Constitutional: Negative.    HENT: Negative.    Eyes: Negative.    Respiratory: Negative.    Cardiovascular: Negative.    Gastrointestinal: Negative.    Genitourinary: Negative.    Musculoskeletal: Negative.    Skin: Negative.    Neurological: Negative.    Psychiatric/Behavioral: Positive for decreased concentration and suicidal ideas. Negative for hallucinations. The patient is nervous/anxious.    All other systems reviewed and are negative.        Physical Exam   BP: 138/76  Pulse: (!) 9  Temp: 98  F (36.7  C)  Resp: 18  SpO2: 100 %  Physical Exam  Vitals and nursing note reviewed.   HENT:      Head: Normocephalic.   Eyes:      Pupils: Pupils are equal, round, and reactive to light.   Pulmonary:      Effort: Pulmonary effort is normal.   Musculoskeletal:         General: Normal range of motion.      Cervical back: Normal range of motion.   Neurological:      General: No focal deficit present.      Mental Status: She is alert.   Psychiatric:         Attention and Perception: Attention and perception normal. She does not perceive auditory or visual hallucinations.         Mood and Affect: Mood  and affect normal.         Speech: Speech normal.         Behavior: Behavior normal. Behavior is not agitated, aggressive, hyperactive or combative. Behavior is cooperative.         Thought Content: Thought content normal. Thought content is not paranoid or delusional. Thought content does not include homicidal or suicidal ideation.         Cognition and Memory: Cognition and memory normal.         Judgment: Judgment normal.         ED Course      Procedures         Mental Health Risk Assessment      PSS-3    Date and Time Over the past 2 weeks have you felt down, depressed, or hopeless? Over the past 2 weeks have you had thoughts of killing yourself? Have you ever attempted to kill yourself? When did this last happen? User   01/26/22 1804 yes yes yes between 1 and 6 months ago FWS      C-SSRS ([place])    Date and Time Q1 Wished to be Dead (Past Month) Q2 Suicidal Thoughts (Past Month) Q3 Suicidal Thought Method Q4 Suicidal Intent without Specific Plan Q5 Suicide Intent with Specific Plan Q6 Suicide Behavior (Lifetime) Within the Past 3 Months? RETIRED: Level of Risk per Screen Screening Not Complete User   01/26/22 1806 yes yes yes yes yes yes -- -- -- FWS   01/26/22 1804 yes yes yes yes yes yes -- -- -- FWS                Item Assessment   Suicidal Ideation Not active   Plan None   Intent None   Suicidal or self-harm behaviors At baseline   Risk Factors Intellectual disability   Protective Factors Supportive social network of family and/or friends        No results found for any visits on 01/26/22.  Medications   OLANZapine zydis (zyPREXA) ODT tab 10 mg (has no administration in time range)        Assessments & Plan (with Medical Decision Making)   Patient with intellectual disability who was distressed today as she appeared in court for an assault charge. She was not able to self-sooth or use coping skills and called 911. She now feels calm and is sedated from a dose of Zyprexa. She feels safe returning to her  group home and sleep in her own bed. Group Home staff is comfortable with her return. She is sent back via cab.    I have reviewed the nursing notes. I have reviewed the findings, diagnosis, plan and need for follow up with the patient.    New Prescriptions    No medications on file       Final diagnoses:   Acute reaction to stress   Intellectual disability       --  Magno Sena MD  [facility] EMERGENCY DEPARTMENT  1/26/2022     Magno Sena MD  01/26/22 1934
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
## 2022-01-27 NOTE — PROGRESS NOTES
Clinic Care Coordination Contact  Care Team Conversations    Patient lives in a group home but SW called as patient expressed wanting a call.    SW called and spoke with patient who shares that she has been vomiting and doesn't know why. SW asked if she has spoke with her group home about this and pt states that she has. SW asks patient if staff have helped making a follow up appt with PCP and pt states that she is responsible for this.    Patient will call and make appt with PCP. Patient thanks QUOC for the call    GREG Ortega   Social Work Clinic Care Coordinator   [facility]  PH: [phone]  [email or web address]

## 2022-01-27 NOTE — TELEPHONE ENCOUNTER
"TRIAGE CALL     Patient calling \"panick attack\"  Lives at a group home   Was at ER yesterday   she is compling of having a panick attack - pt does not sound in distress. Symptoms/concern started 10 minutes  (behavorial disability)  Mention as well:  stomach pain   She had lunch  Vomited 5 X  Was urinating \"5 minutes ago\"  Stated that if she takes a deep breat will hurts  chest pain and difficulty with breathing.    Patient/Child able to Speak in full long sentences without getting short of breath.  RN tried to provided listening and to talk about pleasant things, but pt insisted in chest pain symptoms and difficulty with breathing.     SW called and spoke with patient who shares that she has been vomiting and doesn't know why. SW asked if she has spoke with her group home about this and pt states that she has. SW asks patient if staff have helped making a follow up appt with PCP and pt states that she is responsible for this.   Patient will call and make appt with PCP. Patient thanks  for the call   GREG Ortega   Social Work Clinic Care Coordinator   [facility]  PH: [phone]  [email or web address]       Per protocol, disposition to ER (base on her answers)  Care advise given, caller s questions were answered  Reminded we will be here 24/7 and can call back and ask to speak with a nurse.   Patient verbalized understanding and agrees with plan    Susan Berger RN Nurse Triage Advisor 5:10 PM 1/27/2022      Reason for Disposition    [1] Difficulty breathing AND [2] persists > 10 minutes AND [3] not relieved by reassurance provided by triager    Protocols used: ANXIETY AND PANIC ATTACK-A-AH      "

## 2022-01-28 ENCOUNTER — LAB REQUISITION (OUTPATIENT)
Dept: LAB | Facility: CLINIC | Age: 22
End: 2022-01-28
Payer: COMMERCIAL

## 2022-01-28 DIAGNOSIS — R05.9 COUGH, UNSPECIFIED: ICD-10-CM
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
## 2022-01-28 NOTE — ED NOTES
Bed: HW05  Expected date: 1/27/22  Expected time:   Means of arrival:   Comments:  [id number]   22 y/o female SI/anxiety/hallucinations

## 2022-01-28 NOTE — ED PROVIDER NOTES
[facility] EMERGENCY DEPARTMENT ([facility])       1/27/22  History     Chief Complaint   Patient presents with     Suicidal     The history is provided by the patient and medical records.     Sadaf Ross is a 21 year old female with a past medical history significant for depression, borderline personality disorder, ADHD, bipolar 1 disorder who presents to the Emergency Department for evaluation of suicidal ideation. Patient reports chronic suicidal ideation. She recently had a court date due to assault which was dismissed. Patient also reports recently waking up and having a panic attack, and stress due to the upcoming anniversary of her father's passing. Patient currently lives in a group home.        Past Medical History  Past Medical History:   Diagnosis Date     ADHD (attention deficit hyperactivity disorder)      Bipolar 1 disorder (H)      Borderline personality disorder (H)      Depression      Depressive disorder      Intellectual disability      Obesity      Syncope      Past Surgical History:   Procedure Laterality Date     APPENDECTOMY       APPENDECTOMY       ARIPiprazole ER (ABILIFY MAINTENA) 400 MG extended release inj syringe  benztropine (COGENTIN) 0.5 MG tablet  calcium carbonate (TUMS) 500 MG chewable tablet  chlorhexidine (CHLORHEXIDINE) 0.12 % solution  docusate sodium (COLACE) 100 MG capsule  hydrOXYzine (ATARAX) 50 MG tablet  metoclopramide (REGLAN) 10 MG tablet  naltrexone (DEPADE/REVIA) 50 MG tablet  norgestimate-ethinyl estradiol (SPRINTEC 28) 0.25-35 MG-MCG tablet  OLANZapine (ZYPREXA) 2.5 MG tablet  OLANZapine zydis (ZYPREXA) 5 MG ODT  omeprazole (PRILOSEC) 40 MG DR capsule  ondansetron (ZOFRAN) 4 MG tablet  polyethylene glycol (MIRALAX) 17 g packet  prochlorperazine (COMPAZINE) 10 MG tablet  promethazine (PHENERGAN) 25 MG suppository  venlafaxine (EFFEXOR-XR) 150 MG 24 hr capsule  Vitamin D, Cholecalciferol, 25 MCG (1000 UT) TABS      Allergies   Allergen Reactions      Penicillins Rash and Unknown     Family History  Family History   Problem Relation Age of Onset     Diabetes Type 1 Father      Cancer Paternal Grandfather      Social History   Social History     Tobacco Use     Smoking status: Current Some Day Smoker     Packs/day: 1.00     Years: 5.00     Pack years: 5.00     Types: Cigarettes     Smokeless tobacco: Never Used   Substance Use Topics     Alcohol use: No     Drug use: No      Past medical history, past surgical history, medications, allergies, family history, and social history were reviewed with the patient. No additional pertinent items.     I have reviewed the Medications, Allergies, Past Medical and Surgical History, and Social History in the Epic system.    Review of Systems   Constitutional: Negative for fever.   HENT: Negative for congestion.    Eyes: Negative for redness.   Respiratory: Negative for shortness of breath.    Cardiovascular: Negative for chest pain.   Gastrointestinal: Negative for abdominal pain.   Genitourinary: Negative for difficulty urinating.   Musculoskeletal: Negative for arthralgias and neck stiffness.   Skin: Negative for color change.   Neurological: Negative for headaches.   Psychiatric/Behavioral: Positive for dysphoric mood and suicidal ideas. Negative for confusion.     A complete review of systems was performed with pertinent positives and negatives noted in the HPI, and all other systems negative.    Physical Exam   BP: 126/84  Pulse: 88  Temp: 97.3  F (36.3  C)  Resp: 18  SpO2: 97 %      Physical Exam  Constitutional:       General: She is not in acute distress.     Appearance: She is not diaphoretic.   HENT:      Head: Atraumatic.      Mouth/Throat:      Pharynx: No oropharyngeal exudate.   Eyes:      General: No scleral icterus.     Pupils: Pupils are equal, round, and reactive to light.   Cardiovascular:      Rate and Rhythm: Normal rate and regular rhythm.      Heart sounds: Normal heart sounds. No murmur heard.  No  friction rub. No gallop.    Pulmonary:      Effort: Pulmonary effort is normal. No respiratory distress.      Breath sounds: Normal breath sounds. No stridor. No wheezing, rhonchi or rales.   Chest:      Chest wall: No tenderness.   Abdominal:      General: Abdomen is flat. Bowel sounds are normal. There is no distension.      Palpations: Abdomen is soft. There is no mass.      Tenderness: There is no abdominal tenderness. There is no right CVA tenderness, left CVA tenderness, guarding or rebound.      Hernia: No hernia is present.   Musculoskeletal:         General: No tenderness.      Cervical back: Neck supple.   Skin:     General: Skin is warm.      Findings: No rash.   Neurological:      General: No focal deficit present.      Cranial Nerves: No cranial nerve deficit.   Psychiatric:         Attention and Perception: Attention normal.         Mood and Affect: Mood is depressed. Affect is blunt.         Speech: Speech is delayed.         Behavior: Behavior is slowed and withdrawn.         Thought Content: Thought content is not paranoid or delusional. Thought content does not include homicidal or suicidal ideation. Thought content does not include homicidal or suicidal plan.         ED Course     At 7:47 PM the patient was seen and examined by Florina Dudley MD in Room HW05.        Procedures              No results found for this or any previous visit (from the past 24 hour(s)).  Medications - No data to display          Assessments & Plan (with Medical Decision Making)    21 yof with intellectual disability, chronic depression and SI presented from Group home with another episode of SI. Stressor apparently her father's death this time of the year, please see the 's detailed note, now no SI, comfortable going back to the Group home, follow up with her MH providers.         I have reviewed the nursing notes.    I have reviewed the findings, diagnosis, plan and need for follow up with the  patient.    Discharge Medication List as of 1/27/2022  8:36 PM          Final diagnoses:   Depression, unspecified depression type       I, Jami Arango am serving as a trained medical scribe to document services personally performed by Florina Dudley MD, based on the provider's statements to me.      I, Florina Dudley MD, was physically present and have reviewed and verified the accuracy of this note documented by Jami Arango.     Florina Dudley MD  1/27/2022   [facility] EMERGENCY DEPARTMENT     Florina Dudley MD  01/27/22 2110

## 2022-01-28 NOTE — DISCHARGE INSTRUCTIONS
"If I am feeling unsafe or I am in a crisis, I will:   Contact my established care providers   Call the National Suicide Prevention Lifeline: 870.848.6826   Go to the nearest emergency room   Call 911          Warning signs that I or other people might notice when a crisis is developing for me: I am wanting to go to the ER or call an ambulance.    Things I am able to do on my own to cope or help me feel better: Listen to music. Watch a good movie.    Things that I am able to do with others to cope or help me better: Participate in house activities    Things I can use or do for distraction: Music. Activities. Use my learned skills.    Changes I can make to support my mental health and wellness: Use more of my skills instead of going to ER    People in my life that I can ask for help: House staff, housemates.    Your [facility] has a mental health crisis team you can call 24/7: [facility] Adult Crisis: Call [phone]    Other things that are important when I m in crisis: I have coping skills I can use instead of 911 or the ER. I should ask staff to assist me in coping skills before calling an ambulance!    Crisis Lines  Crisis Text Line  Text 911081  You will be connected with a trained live crisis counselor to provide support.    National Hope Line  1.800.SUICIDE [4424544]      Community Resources  Fast Tracker  Linking people to mental health and substance use disorder resources  fasttrackermn.org     [place] Mental Health Warm Line  Peer to peer support  Monday thru Saturday, 12 pm to 10 pm  551.867.4540 or 2.945.362.8433  Text \"Support\" to 37036    National [place] on Mental Illness (MAGY)  803.747.0595 or 1.888.MAGY.HELPS        Mental Health Apps  My3  https://my3app.org/    VirtualHopeBox  https://[employer].org/apps/virtual-hope-box/          "

## 2022-01-29 ENCOUNTER — NURSE TRIAGE (OUTPATIENT)
Dept: NURSING | Facility: CLINIC | Age: 22
End: 2022-01-29
Payer: MEDICARE

## 2022-01-30 ENCOUNTER — HOSPITAL ENCOUNTER (EMERGENCY)
Facility: CLINIC | Age: 22
Discharge: HOME | End: 2022-01-30
Attending: EMERGENCY MEDICINE | Admitting: EMERGENCY MEDICINE
Payer: MEDICARE

## 2022-01-30 VITALS
SYSTOLIC BLOOD PRESSURE: 139 MMHG | OXYGEN SATURATION: 99 % | HEART RATE: 90 BPM | DIASTOLIC BLOOD PRESSURE: 94 MMHG | RESPIRATION RATE: 16 BRPM | TEMPERATURE: 98 F

## 2022-01-30 DIAGNOSIS — F79 INTELLECTUAL DISABILITY: Chronic | ICD-10-CM

## 2022-01-30 DIAGNOSIS — R45.851 SUICIDAL IDEATION: ICD-10-CM

## 2022-01-30 DIAGNOSIS — F60.3 BORDERLINE PERSONALITY DISORDER (H): Chronic | ICD-10-CM

## 2022-01-30 DIAGNOSIS — F43.10 POSTTRAUMATIC STRESS DISORDER: ICD-10-CM

## 2022-01-30 PROCEDURE — 90791 PSYCH DIAGNOSTIC EVALUATION: CPT

## 2022-01-30 PROCEDURE — 99285 EMERGENCY DEPT VISIT HI MDM: CPT | Mod: 25 | Performed by: EMERGENCY MEDICINE

## 2022-01-30 PROCEDURE — 99284 EMERGENCY DEPT VISIT MOD MDM: CPT | Performed by: EMERGENCY MEDICINE

## 2022-01-30 ASSESSMENT — ENCOUNTER SYMPTOMS
EYE DISCHARGE: 0
CHILLS: 0
ABDOMINAL PAIN: 1
AGITATION: 0
CONFUSION: 0
FEVER: 0
DYSURIA: 0
DIARRHEA: 0
HEADACHES: 0
FREQUENCY: 0
ADENOPATHY: 0
COUGH: 0
NAUSEA: 1
LIGHT-HEADEDNESS: 0
BACK PAIN: 1
VOMITING: 0
WEAKNESS: 0
SHORTNESS OF BREATH: 0
COLOR CHANGE: 0
SORE THROAT: 0
MYALGIAS: 0

## 2022-01-30 NOTE — TELEPHONE ENCOUNTER
Triage Call:    Patient called to report toothache and chest pain.  She reports both toothache and chest pain are 9/10.  She denies difficulty breathing.  She reports feeling confused right now.      According to the protocol, patient should call 911.  Advised patient to lie down with feet elevated and call 911. Patient verbalizes understanding and agrees with plan of care. Patient reports she is planning to call 911 now.    Chloe Piper RN  01/29/22 9:24 PM  [facility] Nurse Advisor    COVID 19 Nurse Triage Plan/Patient Instructions    Please be aware that novel coronavirus (COVID-19) may be circulating in the community. If you develop symptoms such as fever, cough, or SOB or if you have concerns about the presence of another infection including coronavirus (COVID-19), please contact your health care provider or visit [email or web address].     Disposition/Instructions    Call to EMS/911 recommended. Follow protocol based instructions.     Bring Your Own Device:  Please also bring your smart device(s) (smart phones, tablets, laptops) and their charging cables for your personal use and to communicate with your care team during your visit.    Thank you for taking steps to prevent the spread of this virus.  o Limit your contact with others.  o Wear a simple mask to cover your cough.  o Wash your hands well and often.    Resources    [facility]: About COVID-19: [email or web address]    CDC: What to Do If You're Sick: www.cdc.gov/coronavirus/2019-ncov/about/steps-when-sick.html    CDC: Ending Home Isolation: www.cdc.gov/coronavirus/2019-ncov/hcp/disposition-in-home-patients.html     CDC: Caring for Someone: www.cdc.gov/coronavirus/2019-ncov/if-you-are-sick/care-for-someone.html     [facility]: Interim Guidance for Hospital Discharge to Home: www.health.[facility].[place]/diseases/coronavirus/hcp/hospdischarge.pdf    [facility] clinical trials (COVID-19 research studies):  [email or web address]     Below are the COVID-19 hotlines at the [place] Department of Health ([facility]). Interpreters are available.   o For health questions: Call 072-329-5262 or 1-150.119.2215 (7 a.m. to 7 p.m.)  o For questions about schools and childcare: Call 170-725-3119 or 1-629.341.5482 (7 a.m. to 7 p.m.)       Reason for Disposition    Difficult to awaken or acting confused (e.g., disoriented, slurred speech)    Additional Information    Negative: Severe difficulty breathing (e.g., struggling for each breath, speaks in single words)    Protocols used: CHEST PAIN-A-AH

## 2022-01-30 NOTE — ED TRIAGE NOTES
Brought in by EMS.  Reports anniversary of death of father as a trigger for her increasing SI.  Suicide plan = Cutting
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
## 2022-01-30 NOTE — ED PROVIDER NOTES
ED Provider Note  [facility]      History     Chief Complaint   Patient presents with     Suicidal     Pt reports persistent SI with plan of cutting     HPI  Sadaf Ross is a 21 year old female who has a history of ADHD, depression, bipolar, developmental delay, and borderline personality disorder.  She presents for evaluation of suicidal ideation.  She reports that at the 1 year anniversary of her father's death and she is feeling sad.  She thought of hurting herself by cutting her wrist.  She says the staff stopped her from doing anything.  She denies taking any medications or alcohol.  She has no trauma.  She reports that she has a decreased appetite with abdominal pain which she has had for a long time.  She also reports some back pain.  She has no fever or chills.  She has no chest pain or shortness of breath.    Past Medical History  Past Medical History:   Diagnosis Date     ADHD (attention deficit hyperactivity disorder)      Bipolar 1 disorder (H)      Borderline personality disorder (H)      Depression      Depressive disorder      Intellectual disability      Obesity      Syncope      Past Surgical History:   Procedure Laterality Date     APPENDECTOMY       APPENDECTOMY       ARIPiprazole ER (ABILIFY MAINTENA) 400 MG extended release inj syringe  benztropine (COGENTIN) 0.5 MG tablet  calcium carbonate (TUMS) 500 MG chewable tablet  chlorhexidine (CHLORHEXIDINE) 0.12 % solution  docusate sodium (COLACE) 100 MG capsule  hydrOXYzine (ATARAX) 50 MG tablet  metoclopramide (REGLAN) 10 MG tablet  naltrexone (DEPADE/REVIA) 50 MG tablet  norgestimate-ethinyl estradiol (SPRINTEC 28) 0.25-35 MG-MCG tablet  OLANZapine (ZYPREXA) 2.5 MG tablet  OLANZapine zydis (ZYPREXA) 5 MG ODT  omeprazole (PRILOSEC) 40 MG DR capsule  ondansetron (ZOFRAN) 4 MG tablet  polyethylene glycol (MIRALAX) 17 g packet  prochlorperazine (COMPAZINE) 10 MG tablet  promethazine (PHENERGAN) 25 MG  suppository  venlafaxine (EFFEXOR-XR) 150 MG 24 hr capsule  Vitamin D, Cholecalciferol, 25 MCG (1000 UT) TABS      Allergies   Allergen Reactions     Penicillins Rash and Unknown     Family History  Family History   Problem Relation Age of Onset     Diabetes Type 1 Father      Cancer Paternal Grandfather      Social History   Social History     Tobacco Use     Smoking status: Current Some Day Smoker     Packs/day: 1.00     Years: 5.00     Pack years: 5.00     Types: Cigarettes     Smokeless tobacco: Never Used   Substance Use Topics     Alcohol use: No     Drug use: No      Past medical history, past surgical history, medications, allergies, family history, and social history were reviewed with the patient. No additional pertinent items.       Review of Systems   Constitutional: Negative for chills and fever.   HENT: Negative for congestion and sore throat.    Eyes: Negative for discharge.   Respiratory: Negative for cough and shortness of breath.    Cardiovascular: Negative for chest pain and leg swelling.   Gastrointestinal: Positive for abdominal pain and nausea. Negative for diarrhea and vomiting.   Genitourinary: Negative for dysuria and frequency.   Musculoskeletal: Positive for back pain. Negative for myalgias.   Skin: Negative for color change and rash.   Neurological: Negative for weakness, light-headedness and headaches.   Hematological: Negative for adenopathy.   Psychiatric/Behavioral: Negative for agitation, behavioral problems and confusion.         Physical Exam   BP: (!) 139/94  Pulse: 90  Temp: 98  F (36.7  C)  Resp: 16  SpO2: 99 %  Physical Exam  Constitutional:       General: She is not in acute distress.     Appearance: She is well-developed.   HENT:      Head: Normocephalic and atraumatic.   Eyes:      Conjunctiva/sclera: Conjunctivae normal.      Pupils: Pupils are equal, round, and reactive to light.   Neck:      Thyroid: No thyromegaly.      Trachea: No tracheal deviation.   Cardiovascular:       Rate and Rhythm: Normal rate and regular rhythm.      Heart sounds: Normal heart sounds. No murmur heard.      Pulmonary:      Effort: Pulmonary effort is normal. No respiratory distress.      Breath sounds: Normal breath sounds. No wheezing.   Chest:      Chest wall: No tenderness.   Abdominal:      General: There is no distension.      Palpations: Abdomen is soft.      Tenderness: There is no abdominal tenderness.   Musculoskeletal:         General: No tenderness.      Cervical back: Normal range of motion and neck supple.   Skin:     General: Skin is warm.      Findings: No rash.   Neurological:      Mental Status: She is alert and oriented to person, place, and time.      Sensory: No sensory deficit.   Psychiatric:         Behavior: Behavior normal.       ED Course      Procedures         Mental Health Risk Assessment      PSS-3    Date and Time Over the past 2 weeks have you felt down, depressed, or hopeless? Over the past 2 weeks have you had thoughts of killing yourself? Have you ever attempted to kill yourself? When did this last happen? User   01/30/22 1529 yes yes yes more than 6 months ago AAB      C-SSRS ([place])    Date and Time Q1 Wished to be Dead (Past Month) Q2 Suicidal Thoughts (Past Month) Q3 Suicidal Thought Method Q4 Suicidal Intent without Specific Plan Q5 Suicide Intent with Specific Plan Q6 Suicide Behavior (Lifetime) Within the Past 3 Months? RETIRED: Level of Risk per Screen Screening Not Complete User   01/30/22 1529 yes yes yes yes no yes -- -- -- AAB              Suicide assessment completed by mental health (D.E.C., LCSW, etc.)       No results found for any visits on 01/30/22.  Medications - No data to display     Assessments & Plan (with Medical Decision Making)   Patient is a 21-year-old female with a history of ADHD, depression, bipolar, and developmental delay who presents for evaluation of suicidal ideation.  She has frequent evaluations for the same.  Her main trigger is  the 1 year anniversary of her father's death and she is feeling sad.  She did not do anything to harm herself.    She has chronic abdominal pain which has been extensively worked up in the past.  Her exam and vital signs are unremarkable.    A DEC assessment was performed.  It appears that her behaviors are at her baseline.  The group home was contacted.  It seems like there are steps in place to keep her safe, and patient feels safe in her group home environment.  It does not appear that she would benefit from inpatient placement.    Patient be discharged with a safety plan in place and utilize the current resources that are set up for her.    I have reviewed the nursing notes. I have reviewed the findings, diagnosis, plan and need for follow up with the patient.    Discharge Medication List as of 1/30/2022  8:03 PM          Final diagnoses:   Suicidal ideation       --  Leo Pack  [facility] EMERGENCY DEPARTMENT  1/30/2022     Leo Pack MD  01/31/22 1458

## 2022-01-31 NOTE — DISCHARGE INSTRUCTIONS
"  Aftercare Plan  If I am feeling unsafe or I am in a crisis, I will:   Contact my established care providers   Call the National Suicide Prevention Lifeline: 183.632.6420   Go to the nearest emergency room   Call 911      Warning signs that I or other people might notice when a crisis is developing for me: Having thoughts to harm self, having urges to call 911 to go to the emergency department.      Things I am able to do on my own to cope or help me feel better: Listen to music, take walks, practice DBT TIPP skill (placing ice pack below eyes for a count of 30 to initiate the diver's reflex), use 5 senses to practice self-soothing, taking deep breathes, get self away from the situation causing reaction.     Things that I am able to do with others to cope or help me better: Music, practice coping strategies learned in therapy.     Things I can use or do for distraction: Music, walks, conversations.      Changes I can make to support my mental health and wellness: Practice skill use to help cope instead of going to emergency department, challenge self to practice skills, follow up with therapy, if feeling unsafe call [facility].     People in my life that I can ask for help: Group home staff      Your [facility] has a mental health crisis team you can call 24/7: [facility] Mobile Crisis  [phone] (adults)  [phone] (children)     Crisis Lines  Crisis Text Line  Text 351201  You will be connected with a trained live crisis counselor to provide support.     National Hope Line  1.800.SUICIDE [5735434]        Community Resources  Fast Tracker  Linking people to mental health and substance use disorder resources  fasttrackermn.org      [place] Mental [facility] Warm Line  Peer to peer support  Monday thru Saturday, 12 pm to 10 pm  898.128.1171 or 1.266.554.9558  Text \"Support\" to 49428     National [place] [employer] Mental [employer]ness (MAGY)  565.740.5295 or 1.888.MAGY.HELPS        Mental Health Apps  My3  " https://my3app.org/     VirtualHopeBox  https://EveryScape/apps/virtual-hope-box/

## 2022-02-01 LAB
SARS-COV-2 RNA RESP QL NAA+PROBE: NEGATIVE
SCANNED LAB RESULT: NORMAL

## 2022-02-09 ENCOUNTER — HOSPITAL ENCOUNTER (EMERGENCY)
Facility: CLINIC | Age: 22
Discharge: HOME | End: 2022-02-09
Payer: MEDICARE

## 2022-02-09 VITALS
SYSTOLIC BLOOD PRESSURE: 136 MMHG | OXYGEN SATURATION: 100 % | BODY MASS INDEX: 37.22 KG/M2 | RESPIRATION RATE: 16 BRPM | HEART RATE: 97 BPM | HEIGHT: 64 IN | TEMPERATURE: 97.5 F | DIASTOLIC BLOOD PRESSURE: 72 MMHG | WEIGHT: 218 LBS

## 2022-02-09 ASSESSMENT — MIFFLIN-ST. JEOR: SCORE: 1733.84

## 2022-02-09 NOTE — ED TRIAGE NOTES
Patient comes from a group home.  She stated that she has a cyst or boil on her left breast that the staff said she needed to come in to have assessed.  EMS brought her to triage. It is a small nickel size red area, not hard or fluid filled.

## 2022-02-10 ENCOUNTER — HOSPITAL ENCOUNTER (EMERGENCY)
Facility: CLINIC | Age: 22
Discharge: NURSING, REHAB OR LONG-TERM CARE | End: 2022-02-11
Attending: EMERGENCY MEDICINE | Admitting: EMERGENCY MEDICINE
Payer: MEDICARE

## 2022-02-10 DIAGNOSIS — Z11.52 ENCOUNTER FOR SCREENING LABORATORY TESTING FOR SEVERE ACUTE RESPIRATORY SYNDROME CORONAVIRUS 2 (SARS-COV-2): ICD-10-CM

## 2022-02-10 DIAGNOSIS — F33.1 MAJOR DEPRESSIVE DISORDER, RECURRENT EPISODE, MODERATE (H): ICD-10-CM

## 2022-02-10 DIAGNOSIS — R11.2 NAUSEA AND VOMITING, INTRACTABILITY OF VOMITING NOT SPECIFIED, UNSPECIFIED VOMITING TYPE: ICD-10-CM

## 2022-02-10 DIAGNOSIS — F31.62 MODERATE MIXED BIPOLAR I DISORDER (H): ICD-10-CM

## 2022-02-10 DIAGNOSIS — F60.3 EXPLOSIVE PERSONALITY DISORDER (H): ICD-10-CM

## 2022-02-10 DIAGNOSIS — R45.851 SUICIDAL IDEATION: ICD-10-CM

## 2022-02-10 LAB
ALBUMIN UR-MCNC: NEGATIVE MG/DL
ALBUMIN UR-MCNC: NEGATIVE MG/DL
AMPHETAMINES UR QL SCN: NORMAL
APPEARANCE UR: CLEAR
APPEARANCE UR: CLEAR
BACTERIA #/AREA URNS HPF: ABNORMAL /HPF
BACTERIA #/AREA URNS HPF: ABNORMAL /HPF
BARBITURATES UR QL: NORMAL
BASOPHILS # BLD AUTO: 0 10E3/UL (ref 0–0.2)
BASOPHILS NFR BLD AUTO: 0 %
BENZODIAZ UR QL: NORMAL
BILIRUB UR QL STRIP: NEGATIVE
BILIRUB UR QL STRIP: NEGATIVE
CANNABINOIDS UR QL SCN: NORMAL
COCAINE UR QL: NORMAL
COLOR UR AUTO: ABNORMAL
COLOR UR AUTO: ABNORMAL
EOSINOPHIL # BLD AUTO: 0.2 10E3/UL (ref 0–0.7)
EOSINOPHIL NFR BLD AUTO: 1 %
ERYTHROCYTE [DISTWIDTH] IN BLOOD BY AUTOMATED COUNT: 13 % (ref 10–15)
GLUCOSE UR STRIP-MCNC: NEGATIVE MG/DL
GLUCOSE UR STRIP-MCNC: NEGATIVE MG/DL
HCG SERPL QL: NEGATIVE
HCT VFR BLD AUTO: 38.2 % (ref 35–47)
HGB BLD-MCNC: 12.6 G/DL (ref 11.7–15.7)
HGB UR QL STRIP: NEGATIVE
HGB UR QL STRIP: NEGATIVE
IMM GRANULOCYTES # BLD: 0 10E3/UL
IMM GRANULOCYTES NFR BLD: 0 %
KETONES UR STRIP-MCNC: NEGATIVE MG/DL
KETONES UR STRIP-MCNC: NEGATIVE MG/DL
LEUKOCYTE ESTERASE UR QL STRIP: ABNORMAL
LEUKOCYTE ESTERASE UR QL STRIP: ABNORMAL
LYMPHOCYTES # BLD AUTO: 3.1 10E3/UL (ref 0.8–5.3)
LYMPHOCYTES NFR BLD AUTO: 27 %
MCH RBC QN AUTO: 27.8 PG (ref 26.5–33)
MCHC RBC AUTO-ENTMCNC: 33 G/DL (ref 31.5–36.5)
MCV RBC AUTO: 84 FL (ref 78–100)
MONOCYTES # BLD AUTO: 0.5 10E3/UL (ref 0–1.3)
MONOCYTES NFR BLD AUTO: 5 %
MUCOUS THREADS #/AREA URNS LPF: PRESENT /LPF
MUCOUS THREADS #/AREA URNS LPF: PRESENT /LPF
NEUTROPHILS # BLD AUTO: 7.5 10E3/UL (ref 1.6–8.3)
NEUTROPHILS NFR BLD AUTO: 67 %
NITRATE UR QL: NEGATIVE
NITRATE UR QL: NEGATIVE
NRBC # BLD AUTO: 0 10E3/UL
NRBC BLD AUTO-RTO: 0 /100
OPIATES UR QL SCN: NORMAL
PH UR STRIP: 5.5 [PH] (ref 5–7)
PH UR STRIP: 5.5 [PH] (ref 5–7)
PLATELET # BLD AUTO: 294 10E3/UL (ref 150–450)
RBC # BLD AUTO: 4.53 10E6/UL (ref 3.8–5.2)
RBC URINE: 2 /HPF
RBC URINE: 3 /HPF
SP GR UR STRIP: 1.02 (ref 1–1.03)
SP GR UR STRIP: 1.02 (ref 1–1.03)
SQUAMOUS EPITHELIAL: 6 /HPF
SQUAMOUS EPITHELIAL: 8 /HPF
UROBILINOGEN UR STRIP-MCNC: NORMAL MG/DL
UROBILINOGEN UR STRIP-MCNC: NORMAL MG/DL
WBC # BLD AUTO: 11.3 10E3/UL (ref 4–11)
WBC URINE: 4 /HPF
WBC URINE: 5 /HPF

## 2022-02-10 PROCEDURE — 81001 URINALYSIS AUTO W/SCOPE: CPT | Mod: XU | Performed by: EMERGENCY MEDICINE

## 2022-02-10 PROCEDURE — 80053 COMPREHEN METABOLIC PANEL: CPT | Performed by: EMERGENCY MEDICINE

## 2022-02-10 PROCEDURE — 99284 EMERGENCY DEPT VISIT MOD MDM: CPT | Performed by: EMERGENCY MEDICINE

## 2022-02-10 PROCEDURE — 80307 DRUG TEST PRSMV CHEM ANLYZR: CPT | Performed by: EMERGENCY MEDICINE

## 2022-02-10 PROCEDURE — 81001 URINALYSIS AUTO W/SCOPE: CPT | Performed by: EMERGENCY MEDICINE

## 2022-02-10 PROCEDURE — 36415 COLL VENOUS BLD VENIPUNCTURE: CPT | Performed by: EMERGENCY MEDICINE

## 2022-02-10 PROCEDURE — 87086 URINE CULTURE/COLONY COUNT: CPT | Performed by: EMERGENCY MEDICINE

## 2022-02-10 PROCEDURE — C9803 HOPD COVID-19 SPEC COLLECT: HCPCS

## 2022-02-10 PROCEDURE — 85025 COMPLETE CBC W/AUTO DIFF WBC: CPT | Performed by: EMERGENCY MEDICINE

## 2022-02-10 PROCEDURE — 99285 EMERGENCY DEPT VISIT HI MDM: CPT | Mod: 25

## 2022-02-10 PROCEDURE — 82040 ASSAY OF SERUM ALBUMIN: CPT | Performed by: EMERGENCY MEDICINE

## 2022-02-10 PROCEDURE — 83690 ASSAY OF LIPASE: CPT | Performed by: EMERGENCY MEDICINE

## 2022-02-10 PROCEDURE — 84703 CHORIONIC GONADOTROPIN ASSAY: CPT | Performed by: EMERGENCY MEDICINE

## 2022-02-10 PROCEDURE — 250N000011 HC RX IP 250 OP 636: Performed by: EMERGENCY MEDICINE

## 2022-02-10 PROCEDURE — 87635 SARS-COV-2 COVID-19 AMP PRB: CPT | Performed by: EMERGENCY MEDICINE

## 2022-02-10 RX ORDER — ONDANSETRON 4 MG/1
4 TABLET, ORALLY DISINTEGRATING ORAL ONCE
Status: COMPLETED | OUTPATIENT
Start: 2022-02-10 | End: 2022-02-10

## 2022-02-10 RX ADMIN — ONDANSETRON 4 MG: 4 TABLET, ORALLY DISINTEGRATING ORAL at 23:45

## 2022-02-11 ENCOUNTER — HOSPITAL ENCOUNTER (EMERGENCY)
Facility: CLINIC | Age: 22
Discharge: HOME | End: 2022-02-11
Attending: FAMILY MEDICINE | Admitting: FAMILY MEDICINE
Payer: MEDICARE

## 2022-02-11 VITALS
RESPIRATION RATE: 20 BRPM | WEIGHT: 231.7 LBS | TEMPERATURE: 97.6 F | SYSTOLIC BLOOD PRESSURE: 128 MMHG | BODY MASS INDEX: 39.77 KG/M2 | HEART RATE: 90 BPM | DIASTOLIC BLOOD PRESSURE: 84 MMHG | OXYGEN SATURATION: 100 %

## 2022-02-11 VITALS
DIASTOLIC BLOOD PRESSURE: 82 MMHG | HEART RATE: 69 BPM | TEMPERATURE: 97.8 F | BODY MASS INDEX: 37.22 KG/M2 | WEIGHT: 218 LBS | SYSTOLIC BLOOD PRESSURE: 123 MMHG | OXYGEN SATURATION: 97 % | HEIGHT: 64 IN | RESPIRATION RATE: 16 BRPM

## 2022-02-11 DIAGNOSIS — R11.0 NAUSEA: ICD-10-CM

## 2022-02-11 DIAGNOSIS — F41.9 ANXIETY: ICD-10-CM

## 2022-02-11 DIAGNOSIS — F60.3 BORDERLINE PERSONALITY DISORDER (H): ICD-10-CM

## 2022-02-11 LAB
ALBUMIN SERPL-MCNC: 3.8 G/DL (ref 3.4–5)
ALP SERPL-CCNC: 53 U/L (ref 40–150)
ALT SERPL W P-5'-P-CCNC: 38 U/L (ref 0–50)
ANION GAP SERPL CALCULATED.3IONS-SCNC: 6 MMOL/L (ref 3–14)
AST SERPL W P-5'-P-CCNC: 23 U/L (ref 0–45)
BILIRUB SERPL-MCNC: 0.2 MG/DL (ref 0.2–1.3)
BUN SERPL-MCNC: 12 MG/DL (ref 7–30)
CALCIUM SERPL-MCNC: 9.2 MG/DL (ref 8.5–10.1)
CHLORIDE BLD-SCNC: 113 MMOL/L (ref 94–109)
CO2 SERPL-SCNC: 21 MMOL/L (ref 20–32)
CREAT SERPL-MCNC: 0.77 MG/DL (ref 0.52–1.04)
GFR SERPL CREATININE-BSD FRML MDRD: >90 ML/MIN/1.73M2
GLUCOSE BLD-MCNC: 92 MG/DL (ref 70–99)
LIPASE SERPL-CCNC: 156 U/L (ref 73–393)
POTASSIUM BLD-SCNC: 4.2 MMOL/L (ref 3.4–5.3)
PROT SERPL-MCNC: 7.9 G/DL (ref 6.8–8.8)
SARS-COV-2 RNA RESP QL NAA+PROBE: NEGATIVE
SODIUM SERPL-SCNC: 140 MMOL/L (ref 133–144)

## 2022-02-11 PROCEDURE — C9803 HOPD COVID-19 SPEC COLLECT: HCPCS | Performed by: FAMILY MEDICINE

## 2022-02-11 PROCEDURE — 99284 EMERGENCY DEPT VISIT MOD MDM: CPT | Performed by: FAMILY MEDICINE

## 2022-02-11 PROCEDURE — 99283 EMERGENCY DEPT VISIT LOW MDM: CPT | Mod: 25 | Performed by: FAMILY MEDICINE

## 2022-02-11 PROCEDURE — 250N000011 HC RX IP 250 OP 636: Performed by: FAMILY MEDICINE

## 2022-02-11 PROCEDURE — 250N000013 HC RX MED GY IP 250 OP 250 PS 637: Performed by: FAMILY MEDICINE

## 2022-02-11 PROCEDURE — 90791 PSYCH DIAGNOSTIC EVALUATION: CPT

## 2022-02-11 RX ORDER — ONDANSETRON 4 MG/1
4 TABLET, ORALLY DISINTEGRATING ORAL ONCE
Status: COMPLETED | OUTPATIENT
Start: 2022-02-11 | End: 2022-02-11

## 2022-02-11 RX ORDER — OLANZAPINE 5 MG/1
5 TABLET, ORALLY DISINTEGRATING ORAL ONCE
Status: COMPLETED | OUTPATIENT
Start: 2022-02-11 | End: 2022-02-11

## 2022-02-11 RX ADMIN — OLANZAPINE 5 MG: 5 TABLET, ORALLY DISINTEGRATING ORAL at 19:58

## 2022-02-11 RX ADMIN — ONDANSETRON 4 MG: 4 TABLET, ORALLY DISINTEGRATING ORAL at 19:58

## 2022-02-11 ASSESSMENT — MIFFLIN-ST. JEOR: SCORE: 1733.84

## 2022-02-11 NOTE — ED PROVIDER NOTES
History     Chief Complaint   Patient presents with     Suicidal     Nausea, Vomiting, & Diarrhea     HPI  Sadaf Ross is a 21 year old female with a past medical history of ADHD, bipolar disorder, borderline personality disorder, depression, intellectual disability, obesity, syncope, prior appendectomy who presents to the emergency department with a chief complaint of suicidal ideation.  She reports that she has a plan to cut herself.  The patient reports that she has attempted to harm herself in the past by attempting to cut her throat.  She reports she also has thoughts of wanting to hit her head against the wall and has ongoing thoughts of wanting to do so here, so she does not feel that she can remain safe in the emergency department without observation.  No homicidal ideation or intention to harm others.  The patient also reports nausea and vomiting for the past 24 hours.  Associated with some mild upper abdominal pain.  It does not worsen with palpation of her abdomen.  She did take some Zofran at home, but vomited after taking this.  She does not feel that it helped.  The patient denies any recent sick contacts, recent travel, recent head trauma, or recent ingestion of food that is atypical for her or that seemed questionable.  She denies having taken any action to try and harm herself today.  The patient presents from a group home facility.    Per chart review, patient is fully vaccinated against COVID-19 infection including with a booster shot.    I have reviewed the Medications, Allergies, Past Medical and Surgical History, and Social History in the [facility] system.    Past Medical History:   Diagnosis Date     ADHD (attention deficit hyperactivity disorder)      Bipolar 1 disorder (H)      Borderline personality disorder (H)      Depression      Depressive disorder      Intellectual disability      Obesity      Syncope      Past Surgical History:   Procedure Laterality Date     APPENDECTOMY        APPENDECTOMY       Current Facility-Administered Medications   Medication     ondansetron (ZOFRAN-ODT) ODT tab 4 mg     Current Outpatient Medications   Medication     ARIPiprazole ER (ABILIFY MAINTENA) 400 MG extended release inj syringe     benztropine (COGENTIN) 0.5 MG tablet     calcium carbonate (TUMS) 500 MG chewable tablet     chlorhexidine (CHLORHEXIDINE) 0.12 % solution     docusate sodium (COLACE) 100 MG capsule     hydrOXYzine (ATARAX) 50 MG tablet     metoclopramide (REGLAN) 10 MG tablet     naltrexone (DEPADE/REVIA) 50 MG tablet     norgestimate-ethinyl estradiol (SPRINTEC 28) 0.25-35 MG-MCG tablet     OLANZapine (ZYPREXA) 2.5 MG tablet     OLANZapine zydis (ZYPREXA) 5 MG ODT     omeprazole (PRILOSEC) 40 MG DR capsule     ondansetron (ZOFRAN) 4 MG tablet     polyethylene glycol (MIRALAX) 17 g packet     prochlorperazine (COMPAZINE) 10 MG tablet     promethazine (PHENERGAN) 25 MG suppository     venlafaxine (EFFEXOR-XR) 150 MG 24 hr capsule     Vitamin D, Cholecalciferol, 25 MCG (1000 UT) TABS     Allergies   Allergen Reactions     Penicillins Rash and Unknown     Past medical history, past surgical history, medications, and allergies were reviewed with the patient. Additional pertinent items: None    Social History     Socioeconomic History     Marital status: Single     Spouse name: Not on file     Number of children: Not on file     Years of education: Not on file     Highest education level: Not on file   Occupational History     Not on file   Tobacco Use     Smoking status: Current Some Day Smoker     Packs/day: 1.00     Years: 5.00     Pack years: 5.00     Types: Cigarettes     Smokeless tobacco: Never Used   Substance and Sexual Activity     Alcohol use: No     Drug use: No     Sexual activity: Yes     Partners: Female, Male     Birth control/protection: Pill   Other Topics Concern     Not on file   Social History Narrative     Not on file     Social Determinants of Health     Financial Resource  Strain: Not on file   Food Insecurity: Not on file   Transportation Needs: Not on file   Physical Activity: Not on file   Stress: Not on file   Social Connections: Not on file   Intimate Partner Violence: Not on file   Housing Stability: Not on file     Social history was reviewed with the patient. Additional pertinent items: None    Review of Systems  General: No fevers or chills  Skin: No rash or diaphoresis  Eyes: No eye redness or discharge  Ears/Nose/Throat: No rhinorrhea or nasal congestion  Respiratory: No cough or SOB  Cardiovascular: No chest pain or palpitations  Gastrointestinal: Positive for nausea and vomiting  Genitourinary: No urinary frequency, hematuria, or dysuria  Musculoskeletal: No arthralgias or myalgias  Neurologic: No numbness or weakness  Psychiatric: Positive for depression with suicidal ideation  Hematologic/Lymphatic/Immunologic: No leg swelling, no easy bruising/bleeding  Endocrine: No polyuria/polydypsia    A complete review of systems was performed with pertinent positives and negatives noted in the HPI, and all other systems negative.    Physical Exam   BP: 131/86  Pulse: 96  Temp: 97.6  F (36.4  C)  Resp: 16  Weight: 105.1 kg (231 lb 11.2 oz)  SpO2: 98 %      General: Well nourished, well developed, NAD  HEENT: EOMI, anicteric. NCAT, MMM  Neck: no jugular venous distension, supple, nl ROM  Cardiac: Regular rate and rhythm. No murmurs, rubs, or gallops. Normal S1, S2.  Intact peripheral pulses  Pulm: CTAB, no stridor, wheezes, rales, rhonchi  Abd: Soft, patient reports pain in the epigastric area, but there is no significant tenderness to palpation in this area, no rebound or guarding, nondistended.  No masses palpated.    Skin: Warm and dry to the touch.  No rash  Extremities: No LE edema, no cyanosis, w/w/p  Neuro: A&Ox3, no gross focal deficits  Psych: Patient reports suicidal ideation with plan to cut herself and/or bang her head against the wall    ED Course        Procedures                           Labs Ordered and Resulted from Time of ED Arrival to Time of ED Departure   URINE MACROSCOPIC WITH REFLEX TO MICRO - Abnormal       Result Value    Color Urine Light Yellow      Appearance Urine Clear      Glucose Urine Negative      Bilirubin Urine Negative      Ketones Urine Negative      Specific Gravity Urine 1.019      Blood Urine Negative      pH Urine 5.5      Protein Albumin Urine Negative      Urobilinogen Urine Normal      Nitrite Urine Negative      Leukocyte Esterase Urine Large (*)     Bacteria Urine Few (*)     RBC Urine 3 (*)     WBC Urine 4      Squamous Epithelials Urine 8 (*)     Mucus Urine Present (*)    DRUG ABUSE SCREEN 1 URINE (ED) - Normal    Amphetamines Urine Screen Negative      Barbiturates Urine Screen Negative      Benzodiazepines Urine Screen Negative      Cannabinoids Urine Screen Negative      Cocaine Urine Screen Negative      Opiates Urine Screen Negative     COMPREHENSIVE METABOLIC PANEL   LIPASE   ROUTINE UA WITH MICROSCOPIC REFLEX TO CULTURE   HCG QUALITATIVE PREGNANCY   COVID-19 VIRUS (CORONAVIRUS) BY PCR            Results for orders placed or performed during the hospital encounter of 02/10/22 (from the past 24 hour(s))   UA reflex to Microscopic   Result Value Ref Range    Color Urine Light Yellow Colorless, Straw, Light Yellow, Yellow    Appearance Urine Clear Clear    Glucose Urine Negative Negative mg/dL    Bilirubin Urine Negative Negative    Ketones Urine Negative Negative mg/dL    Specific Gravity Urine 1.019 1.003 - 1.035    Blood Urine Negative Negative    pH Urine 5.5 5.0 - 7.0    Protein Albumin Urine Negative Negative mg/dL    Urobilinogen Urine Normal Normal, 2.0 mg/dL    Nitrite Urine Negative Negative    Leukocyte Esterase Urine Large (A) Negative    Bacteria Urine Few (A) None Seen /HPF    RBC Urine 3 (H) <=2 /HPF    WBC Urine 4 <=5 /HPF    Squamous Epithelials Urine 8 (H) <=1 /HPF    Mucus Urine Present (A) None Seen /LPF   Urine Drugs of  Abuse Screen    Narrative    The following orders were created for panel order Urine Drugs of Abuse Screen.  Procedure                               Abnormality         Status                     ---------                               -----------         ------                     Drug abuse screen 1 urin...[[id number]]  Normal              Final result                 Please view results for these tests on the individual orders.   Drug abuse screen 1 urine (ED)   Result Value Ref Range    Amphetamines Urine Screen Negative Screen Negative    Barbiturates Urine Screen Negative Screen Negative    Benzodiazepines Urine Screen Negative Screen Negative    Cannabinoids Urine Screen Negative Screen Negative    Cocaine Urine Screen Negative Screen Negative    Opiates Urine Screen Negative Screen Negative       Labs, vital signs, and imaging studies were reviewed by me.    Medications   ondansetron (ZOFRAN-ODT) ODT tab 4 mg (has no administration in time range)       Assessments & Plan (with Medical Decision Making)   Sdaaf Ross is a 21 year old female who presents to the emergency department with suicidal ideation.  Patient have mental health emergency department.  Patient also complains of nausea vomiting.  Differential diagnosis includes gastritis/gastroenteritis, COVID-19 infection, other viral syndrome, pregnancy (IUP, ectopic), UTI.  Labs, urinalysis, COVID-19 testing ordered to further evaluate the patient.  Zofran ordered for symptomatic relief in the emergency department.    Urinalysis is contaminated, does not appear consistent with UTI.  Will send for urine culture.  UDS is negative.    I have reviewed the nursing notes.    I have reviewed the findings, diagnosis, plan and need for follow up with the patient.    Patient to be signed out to oncoming provider.  Mental health assessment and labs are pending at this time.  Disposition pending laboratory work-up results and dec  recommendations.    New  Prescriptions    No medications on file       Final diagnoses:   Suicidal ideation   Nausea and vomiting, intractability of vomiting not specified, unspecified vomiting type     Tori Whalen MD  2/10/2022   [facility] EMERGENCY DEPARTMENT     Tori Whalen MD  02/10/22 8507

## 2022-02-11 NOTE — ED NOTES
2/10/2022  Sadaf Ross 2000     [facility] Crisis Assessment    Patient was assessed: in person  Patient location: [facility]    Referral Data and Chief Complaint  Sadaf Ross is a 21 year old who uses she/her pronouns. Patient presented to the ED via EMS and was referred to the ED by self. Patient is presenting to the ED for the following concerns: Suicidal ideation.      Informed Consent and Assessment Methods    Patient is her own guardian. Writer met with patient and explained the crisis assessment process, including applicable information disclosures and limits to confidentiality, assessed understanding of the process, and obtained consent to proceed with the assessment. Patient was observed to be able to participate in the assessment as evidenced by participation. Assessment methods included conducting a formal interview with patient, review of medical records, collaboration with medical staff, and obtaining relevant collateral information from family and community providers when available.    Narrative Summary of Presenting Problem and Current Functioning  What led to the patient presenting for crisis services, factors that make the crisis life threatening or complex, stressors, how is this disrupting the patient's life, and how current functioning is in comparison to baseline. How is patient presenting during the assessment.     Patient states she was having suicidal ideation and thoughts of cutting herself due to the approaching anniversary of her father's death on February 17. Spoke with group home staff Arlene who stated that patient would not take medications at the house and insisted on coming to the ED due to experiencing nausea. Patient has had 8 previous ED visits to [facility] for SI and SIB. Pt's SI and SIB is baseline for her behavior. Patient stated that she feels safe at the group home and that staff is good about taking care of her. Patient has referred to prior ED visits and ambulance rides as  entertaining. Patient has an extensive care team in place with outpatient therapy in an [facility] worker, medication management through [facility], a PCP through [facility] (no specific provider noted), and a  Jyoti at [phone]. Pt currently lives at a group home ([phone] direct line to [facility]). Patient stated she wants to return to the group home and staff agrees to accept patient on her return. ED will arrange.    History of the Crisis  Duration of the current crisis, coping skills attempted to reduce the crisis, community resources used, and past presentations.    Patient is a chronic user of EMS and the ED, and has presented here for same and similar several times due to her baseline presentation of suicidal ideation. Patient has an extensive care team in place, and has an ARM appointment tomorrow at 12:30pm at the group home that she wants to be there for. See previous notes as similar baseline behavior and presentation.     Collateral Information  Group [place] staff - Arlene @ [phone]. Reviewed prior ED presentations. Care Everywhere.    Risk Assessment    Risk of Harm to Self     ESS-6  1.a. Over the past 2 weeks, have you had thoughts of killing yourself? Yes  1.b. Have you ever attempted to kill yourself and, if yes, when did this last happen? No   2. Recent or current suicide plan? No   3. Recent or current intent to act on ideation? No  4. Lifetime psychiatric hospitalization? Yes  5. Pattern of excessive substance use? No  6. Current irritability, agitation, or aggression? No  Scoring note: BOTH 1a and 1b must be yes for it to score 1 point, if both are not yes it is zero. All others are 1 point per number. If all questions 1a/1b - 6 are no, risk is negligible. If one of 1a/1b is yes, then risk is mild. If either question 2 or 3, but not both, is yes, then risk is automatically moderate regardless of total score. If both 2 and 3 are yes, risk is automatically high regardless  of total score.     Score: 1, mild risk    The patient has the following risk factors for suicide: depressive symptoms, poor decision making, poor impulse control, preoccupied with death/dying, prior suicide attempt and restless/agitated    Is the patient experiencing current suicidal ideation: Yes. Plan: cut self but no intent    Is the patient engaging in preparatory suicide behaviors (formulating how to act on plan, giving away possessions, saying goodbye, displaying dramatic behavior changes, etc)? No    Does the patient have access to firearms or other lethal means? no    The patient has the following protective factors: social support, voluntarily seeking mental health support, displays resiliency , established relationship community mental health provider(s) and safe/stable housing    Support system information: group home staff. Providers.    Does the patient engage in non-suicidal self-injurious behavior (NSSI/SIB)? no    Is the patient vulnerable to sexual exploitation?  Yes patient has an intellectual disability    Is the patient experiencing abuse or neglect? no    Is the patient a vulnerable adult? Yes      Risk of Harm to Others  The patient has the following risk factors of harm to others: no risk factors identified    Does the patient have thoughts of harming others? No    Is the patient engaging in sexually inappropriate behavior?  no       Current Substance Abuse    Is there recent substance abuse? no    Was a urine drug screen or blood alcohol level obtained: No    CAGE AID  Have you felt you ought to cut down on your drinking or drug use?  No  Have people annoyed you by criticizing your drinking or drug use? No  Have you felt bad or guilty about your drinking or drug use? No  Have you ever had a drink or used drugs first thing in the morning to steady your nerves or to get rid of a hangover? No  Score: 0/4       Current Symptoms/Concerns    Symptoms  Attention, hyperactivity, and impulsivity  symptoms present: No    Anxiety symptoms present: No      Appetite symptoms present: No     Behavioral difficulties present: No     Cognitive impairment symptoms present: No    Depressive symptoms present: Yes Depressed mood, Impaired concentration, Impaired decision making  and Thoughts of suicide/death      Eating disorder symptoms present: No    Learning disabilities, cognitive challenges, and/or developmental disorder symptoms present: Yes: Developmental Incidents, Functional Impairments, Mood and Self-Regulation     Manic/hypomanic symptoms present: No    Personality and interpersonal functioning difficulties present : Yes: Emotional Deregulation and Impaired Interpersonal Functioning    Psychosis symptoms present: No      Sleep difficulties present: No    Substance abuse disorder symptoms present: No     Trauma and stressor related symptoms present: No           Mental Status Exam   Affect: Blunted   Appearance: Disheveled    Attention Span/Concentration: Attentive?    Eye Contact: Engaged and Variable   Fund of Knowledge: Delayed    Language /Speech Content: Fluent   Language /Speech Volume: Normal    Language /Speech Rate/Productions: Normal    Recent Memory: Intact   Remote Memory: Variable   Mood: Normal    Orientation to Person: Yes    Orientation to Place: Yes   Orientation to Time of Day: Yes    Orientation to Date: Yes    Situation (Do they understand why they are here?): Yes    Psychomotor Behavior: Normal    Thought Content: Clear   Thought Form: Intact       Mental Health and Substance Abuse History    History  Current and historical diagnoses or mental health concerns: ADHD, Bi Polar Disorder, Borderline Personality Disorder, MDD, Intellectual Disability    Prior MH services (inpatient, programmatic care, outpatient, etc) : Yes Extensive  Patient has an extensive care team with outpatient therapy in an [facility], and medication management through [facility], a PCP through [facility] (no  specific provider noted), and a  Jyoti at [phone]. Pt currently lives at a group home ([phone] direct line to [facility])    Has the patient used [facility] crisis team services before?: No    History of substance abuse: No    Prior LIZZETTE services (inpatient, programmatic care, detox, outpatient, etc) : No    History of commitment: No    Family history of MH/LIZZETTE: Yes    Trauma history: Yes    Medication  Psychotropic medications: None noted    Current Outpatient Medications   Medication     ARIPiprazole ER (ABILIFY MAINTENA) 400 MG extended release inj syringe     benztropine (COGENTIN) 0.5 MG tablet     calcium carbonate (TUMS) 500 MG chewable tablet     chlorhexidine (CHLORHEXIDINE) 0.12 % solution     docusate sodium (COLACE) 100 MG capsule     hydrOXYzine (ATARAX) 50 MG tablet     metoclopramide (REGLAN) 10 MG tablet     naltrexone (DEPADE/REVIA) 50 MG tablet     norgestimate-ethinyl estradiol (SPRINTEC 28) 0.25-35 MG-MCG tablet     OLANZapine (ZYPREXA) 2.5 MG tablet     OLANZapine zydis (ZYPREXA) 5 MG ODT     omeprazole (PRILOSEC) 40 MG DR capsule     ondansetron (ZOFRAN) 4 MG tablet     polyethylene glycol (MIRALAX) 17 g packet     prochlorperazine (COMPAZINE) 10 MG tablet     promethazine (PHENERGAN) 25 MG suppository     venlafaxine (EFFEXOR-XR) 150 MG 24 hr capsule     Vitamin D, Cholecalciferol, 25 MCG (1000 UT) TABS         Current Care Team  Primary Care Provider: Yes. Name: [facility]. Location: [place]. Date of last visit: Unknown. Frequency: Unknown. Perceived helpfulness: Yes.    Psychiatrist: Yes. Name: Emma Jacobson. Location: [facility] [place], [place] ([phone]). Date of last visit: unknown. Frequency: unknown. Perceived helpfulness: unknown.    Therapist: Yes. Name: Shannen Martin. Location: [facility] ([phone]). Date of last visit: unknown. Frequency: unknown. Perceived helpfulness: unknown.    : Yes. Name: Jyoti Malik.  Location: [facility] ([phone]). Date of last visit: unknown. Frequency: unknown. Perceived helpfulness: unknown.    CTSS or ARMHS: [facility]    ACT Team: No    Other: No    Release of Information  Was a release of information signed: No. Reason: Refused      Biopsychosocial Information    Socioeconomic Information  Current living situation: Group home [phone]    Employment/income source: SSDA    Relevant legal issues: Dismissed assault charge    Cultural, Confucianist, or spiritual influences on mental health care: None reported    Is the patient active in the  or a : No      Relevant Medical Concerns   Patient identifies concerns with completing ADLs? No     Patient can ambulate independently? Yes     Other medical concerns? No     History of concussion or TBI? No        Diagnosis    F33.1 Major Depressive Disorder, Recurrent, Moderate - by history    F60.3 Borderline Personality Disorder - by history     F31.62 Bi Polar Disorder, Current episode mixed - moderate      Therapeutic Intervention  The following therapeutic methodologies were employed when working with the patient: establishing rapport, active listening, assessing dimensions of crisis, solution focused brief therapy, identifying additional supports and alternative coping skills, establishing a discharge plan, safety planning, psychoeducation, motivational interviewing and brief supportive therapy. Patient response to intervention: positive.      Disposition  Recommended disposition: Group home      Reviewed case and recommendations with attending provider. Attending Name: Dr. RINA Sinha, DO      Attending concurs with disposition: Yes      Patient concurs with disposition: Yes      Guardian concurs with disposition: NA     Final disposition: Group home: return to group home .       Clinical Substantiation of Recommendations   Rationale with supporting factors for disposition and diagnosis.     Patient  reports concerns of SIB and suicidal thoughts at the upcoming anniversary of her fathers death. Patient has an extensive history of presenting to the ED with same and/or similar issues that are recognized as baseline behavior Patient states that she is currently not feeling self injurious, is not suicidal, and is safe and requesting to return to her group homeas she has an [facility] appointment tomorrow at 12:30pm at the home. Group home is contacted and is willing to have patient return. Patient has an extensive care team in place. Final disposition is a return to her group home.      Assessment Details  Patient interview started at: 0145 and completed at: 0215.    Total duration spent on the patient case in minutes: .50 hrs     CPT code(s) utilized: 69144 - Psychotherapy for Crisis - 60 (30-74*) min       Aftercare and Safety Planning  Follow up plans with MH/LIZZETTE services: Yes patient will meet with current providers.      Aftercare plan placed in the AVS and provided to patient: Yes. Given to patient by RN    Mario Andrew MA      Aftercare Plan  If I am feeling unsafe or I am in a crisis, I will:   Contact my established care providers   Call the National Suicide Prevention Lifeline: [phone]   Go to the nearest emergency room   Call 911          Warning signs that I or other people might notice when a crisis is developing for me: I am wanting to call 911 instead of working out the issue at the group home. I am not using coping skills.    Things I am able to do on my own to cope or help me feel better: Use my learned coping skills. Go for a walk. Watch TV    Things that I am able to do with others to cope or help me better: Interact with group home staff.     Things I can use or do for distraction: My stuffed animals. Work on my coping skills I have learned. Watch TV. Engage staff.    Changes I can make to support my mental health and wellness: Keep my appointments with [facility] and other providers. Use my  "skills rather than relying on 911 or the ER.    People in my life that I can ask for help: Group home staff. Providers.     Your county has a mental health crisis team you can call 24/7: [phone]    Other things that are important when I m in crisis: I am not alone. Don't use 911 unless it's an emergency.    Additional resources and information:       What is Mindfulness?   Mindfulness is an ancient eastern practice which is very relevant for our lives today.Mindfulness is a very simple concept. Mindfulness means paying attention in a particular way: on purpose, in the present moment, and non-judgementally.   Mindfulness does not conflict with any beliefs ortraditions, whether Quaker, cultural or scientific. It is simply a practical way to notice thoughts, physical sensations, sights, sounds, smells - anything we might not normally notice. The actual skillsmight be simple, but because it is so different to how our minds normally behave, it takes a lot of practice.   We might go out into the garden and as we look around, we might think \"That grassreally needs cutting, and that vegetable patch looks very untidy\". A young child on the other hand, will call over excitedly, \"Hey - come and look at this ant!\"   Mindfulness can simply be noticing whatwe don't normally notice, because our heads are too busy in the future or in the past - thinking about what we need to do, or going over what we have done.   Mindfulness might simply be described aschoosing and learning to control our focus of attention. Page 2 of 4 fara.co.uk/mindfulness.htm www.nelson.von Pacheco 2009, permission to use for therapy purposes.      In a car, we can sometimes drive for miles on  automatic  , without really being aware of what we are doing. In the same way, we may not be really  present , [id number], for much of our lives: We canoften be  miles away  without knowing it.   On automatic , we are more likely to " "have our  buttons pressed : around us and thoughts, feelings and sensations (of which we may be only dimly aware) can trigger old habits of thinking that are often unhelpful and may lead to worsening mood.   By becoming moreaware of our thoughts, feelings, and body sensations, from moment to moment, we give ourselves the possibility of greater freedom and choice; we do not have to go into the same old  mental ruts  that may have caused problems in the past.     Mindful Activity   If we wash the dishes each evening, we might tend to be  in our heads? as we?re washing up, thinking about what we have to do, what we've done earlier in the day, worrying about future events, or regretful thoughts about the past. Again, ayoung child might see things differently, \"Listen to those bubbles! They're fun!\"   Washing up or another routine activity can become a routine (practice of) mindful activity for us. We might notice thetemperature of the water and how it feels on the skin, the texture of the bubbles on the skin, and yes, we might hear the bubbles as they softly pop. The sounds of the water as we take out and put dishesinto the water. The smoothness of the plates, and the texture of the sponge. Just noticing what we might not normally notice.   A mindful walk brings new pleasures. Walking is something most of us doat some time during the day. We can practice, even if only for a couple of minutes at a time, mindful walking. Rather than be \"in our heads\", we can look around and notice what we see, hear, sense. We mightnotice the sensations in our own body just through the act of walking. Noticing the sensations and movement of our feet, legs, arms, head and body as we take each step. Noticing our breathing. Thoughts willcontinuously intrude, but we can just notice them, and then bring our attention back to our walking.   The more we practice, perhaps the more (initially at least) we will notice those thoughtsintruding, and " that's ok. The only aim of mindful activity is to bring our attention back to the activity continually, noticing those sensations, from outside and within us. Page 3 of 4 www.getselfhelp.co.uk/mindfulness.htm www.nelson.von Pacheco 2009, permission to use for therapy purposes.     Mindful Breathing   The primary focus in Mindfulness Meditation is the breathing. However, the primary goal is a calm, non-judging awareness, allowing thoughts and feelings to come and go withoutgetting caught up in them. This creates calmness and acceptance.   ? Sit comfortably, with your eyes closed and your spine reasonably straight.   ? Direct your attention to your breathing.   ? When thoughts, emotions, physical feelings or external sounds occur, accept them, giving them the space to come and go without judging or getting involved with them.   ? When you notice that your attention has drifted off and is becoming caught up in thoughts or feelings,simply note that the attention has drifted, and then gently bring the attention back to your breathing.     It's ok and natural for thoughts to arise, and for your attention to follow them. No matterhow many times this happens, just keep bringing your attention back to your breathing.     Breathing Meditation 1 (Winston 1996)   Assume a comfortable posture lying on your back or sitting. If you are sitting, keep the spine straight and let your shoulders drop.   Close your eyes if itfeels comfortable.   Bring your attention to your belly, feeling it rise or expand gently on the in-breath and fall or recede on the out-breath.   Keep your focus on the breathing,  being with? each in-breath for its full duration and with each out-breath for its full duration, as if you were riding the waves of your own breathing.   Every timeyou notice that your mind has wandered off the breath, notice what it was that took you away and then gently bring your attention back to your belly and the feeling of  "the breath coming in and out.   If your mind wanders away from the breath a thousand times, then your job is simply to bring it back to the breath every time, no matter what it becomes preoccupied with.   Practice this exercisefor fifteen minutes at a convenient time every day, whether you feel like it or not, for one week and see how it feels to incorporate a disciplined meditation practice into your life. Be aware of how itfeels to spend some time each day just being with your breath without having to do anything.Page 4 of 4 www.[employer].co.uk/mindfulness.htm www.[facility]   Kim Pacheco 2009, permission to use fortherapy purposes.     Breathing Meditation 2 (Winston 1996)   ? Tune into your breathing at different times during the day, feeling the belly go through one or two risings and fallings.   ? Become aware of your thoughts andfeelings at these moments, just observing them without judging them or yourself.   ? At the same time, be aware of any changes in the way you are seeing things and feeling about yourself.     Using mindfulness to cope with negative experiences (thoughts, feelings, events)   As we become morepractised at using mindfulness for breathing, body sensations and routine daily activities, so we can then learn to be mindful of our thoughts and feelings, to become observers, and then more accepting ofthem. This results in less distressing feelings, and increases our ability to enjoy our lives.   With mindfulness, even the most disturbing sensations, feelings, thoughts, and experiences, can be viewedfrom a wider perspective as passing events in the mind, rather than as \"us\", or as being necessarily true. (Josiah 2003)   When we are more practiced in using mindfulness, we can use it even in of intense distress, by becoming mindful of the actual experience as an observer, using mindful breathing and focussing our attention on the breathing, listening to the distressing thoughts " "mindfully,recognising them as merely thoughts, breathing with them, allowing them to happen without believing them or arguing with them. If thoughts are too strong or loud, then we can move our attention to ourbreath, the body, or to sounds around us.   Maxim Montero uses the example of waves to help explain mindfulness.   Think of your mind as the surface of a lake or an ocean. There are always waveson the water, sometimes big, sometimes small, sometimes almost imperceptible. The water's waves are churned up by winds, which come and go and vary in direction and intensity, just as do the winds of stressand change in our lives, which stir up waves in our mind. It's possible to find shelter from much of the wind that agitates the mind. Whatever we might do to prevent them, the winds of life and of the mind blow.   \"You can't stop the waves, but you can learn to surf\" (Winston 2004       /          "

## 2022-02-11 NOTE — DISCHARGE INSTRUCTIONS
Follow-up with your primary care provider and your mental health care team.  Return to the emergency department as needed for any new or worsening symptoms.    If I am feeling unsafe or I am in a crisis, I will:   Contact my established care providers   Call the National Suicide Prevention Lifeline: [phone]   Go to the nearest emergency room   Call 911          Warning signs that I or other people might notice when a crisis is developing for me: I am wanting to call 911 instead of working out the issue at the group home. I am not using coping skills.    Things I am able to do on my own to cope or help me feel better: Use my learned coping skills. Go for a walk. Watch TV    Things that I am able to do with others to cope or help me better: Interact with group home staff.     Things I can use or do for distraction: My stuffed animals. Work on my coping skills I have learned. Watch TV. Engage staff.    Changes I can make to support my mental health and wellness: Keep my appointments with [facility] and other providers. Use my skills rather than relying on 911 or the ER.    People in my life that I can ask for help: Group home staff. Providers.     Your [facility] has a mental health crisis team you can call 24/7: [phone]    Other things that are important when I m in crisis: I am not alone. Don't use 911 unless it's an emergency.    Additional resources and information:       What is Mindfulness?   Mindfulness is an ancient eastern practice which is very relevant for our lives today.Mindfulness is a very simple concept. Mindfulness means paying attention in a particular way: on purpose, in the present moment, and non-judgementally.   Mindfulness does not conflict with any beliefs ortraditions, whether Jehovah's witness, cultural or scientific. It is simply a practical way to notice thoughts, physical sensations, sights, sounds, smells - anything we might not normally notice. The actual skillsmight be simple, but because it is  "so different to how our minds normally behave, it takes a lot of practice.   We might go out into the garden and as we look around, we might think \"That grassreally needs cutting, and that vegetable patch looks very untidy\". A young child on the other hand, will call over excitedly, \"Hey - come and look at this ant!\"   Mindfulness can simply be noticing whatwe don't normally notice, because our heads are too busy in the future or in the past - thinking about what we need to do, or going over what we have done.   Mindfulness might simply be described aschoosing and learning to control our focus of attention. Page 2 of 4 [employer] [facility].uk/mindfulness.htm www.[employer]   Kim Pacheco 2009, permission to use for therapy purposes.      In a car, we can sometimes drive for miles on  automatic  , without really being aware of what we are doing. In the same way, we may not be really  present , [id number], for much of our lives: We canoften be  miles away  without knowing it.   On automatic , we are more likely to have our  buttons pressed : around us and thoughts, feelings and sensations (of which we may be only dimly aware) can trigger old habits of thinking that are often unhelpful and may lead to worsening mood.   By becoming moreaware of our thoughts, feelings, and body sensations, from moment to moment, we give ourselves the possibility of greater freedom and choice; we do not have to go into the same old  mental ruts  that may have caused problems in the past.     Mindful Activity   If we wash the dishes each evening, we might tend to be  in our heads? as we?re washing up, thinking about what we have to do, what we've done earlier in the day, worrying about future events, or regretful thoughts about the past. Again, ayoung child might see things differently, \"Listen to those bubbles! They're fun!\"   Washing up or another routine activity can become a routine (practice of) mindful activity for us. We " "might notice thetemperature of the water and how it feels on the skin, the texture of the bubbles on the skin, and yes, we might hear the bubbles as they softly pop. The sounds of the water as we take out and put dishesinto the water. The smoothness of the plates, and the texture of the sponge. Just noticing what we might not normally notice.   A mindful walk brings new pleasures. Walking is something most of us doat some time during the day. We can practice, even if only for a couple of minutes at a time, mindful walking. Rather than be \"in our heads\", we can look around and notice what we see, hear, sense. We mightnotice the sensations in our own body just through the act of walking. Noticing the sensations and movement of our feet, legs, arms, head and body as we take each step. Noticing our breathing. Thoughts willcontinuously intrude, but we can just notice them, and then bring our attention back to our walking.   The more we practice, perhaps the more (initially at least) we will notice those thoughtsintruding, and that's ok. The only aim of mindful activity is to bring our attention back to the activity continually, noticing those sensations, from outside and within us. Page 3 of 4 www.[employer].co.uk/mindfulness.htm www.[employer].von Pacheco 2009, permission to use for therapy purposes.     Mindful Breathing   The primary focus in Mindfulness Meditation is the breathing. However, the primary goal is a calm, non-judging awareness, allowing thoughts and feelings to come and go withoutgetting caught up in them. This creates calmness and acceptance.   ? Sit comfortably, with your eyes closed and your spine reasonably straight.   ? Direct your attention to your breathing.   ? When thoughts, emotions, physical feelings or external sounds occur, accept them, giving them the space to come and go without judging or getting involved with them.   ? When you notice that your attention has drifted off and is becoming " caught up in thoughts or feelings,simply note that the attention has drifted, and then gently bring the attention back to your breathing.     It's ok and natural for thoughts to arise, and for your attention to follow them. No matterhow many times this happens, just keep bringing your attention back to your breathing.     Breathing Meditation 1 (Winston 1996)   Assume a comfortable posture lying on your back or sitting. If you are sitting, keep the spine straight and let your shoulders drop.   Close your eyes if itfeels comfortable.   Bring your attention to your belly, feeling it rise or expand gently on the in-breath and fall or recede on the out-breath.   Keep your focus on the breathing,  being with? each in-breath for its full duration and with each out-breath for its full duration, as if you were riding the waves of your own breathing.   Every timeyou notice that your mind has wandered off the breath, notice what it was that took you away and then gently bring your attention back to your belly and the feeling of the breath coming in and out.   If your mind wanders away from the breath a thousand times, then your job is simply to bring it back to the breath every time, no matter what it becomes preoccupied with.   Practice this exercisefor fifteen minutes at a convenient time every day, whether you feel like it or not, for one week and see how it feels to incorporate a disciplined meditation practice into your life. Be aware of how itfeels to spend some time each day just being with your breath without having to do anything.Page 4 of 4 www.[employer]elfhelp.co.uk/mindfulness.htm www.[employer]elson.von Pacheco 2009, permission to use fortherapy purposes.     Breathing Meditation 2 (Winston 1996)   ? Tune into your breathing at different times during the day, feeling the belly go through one or two risings and fallings.   ? Become aware of your thoughts andfeelings at these moments, just observing them without judging  "them or yourself.   ? At the same time, be aware of any changes in the way you are seeing things and feeling about yourself.     Using mindfulness to cope with negative experiences (thoughts, feelings, events)   As we become morepractised at using mindfulness for breathing, body sensations and routine daily activities, so we can then learn to be mindful of our thoughts and feelings, to become observers, and then more accepting ofthem. This results in less distressing feelings, and increases our ability to enjoy our lives.   With mindfulness, even the most disturbing sensations, feelings, thoughts, and experiences, can be viewedfrom a wider perspective as passing events in the mind, rather than as \"us\", or as being necessarily true. (Josiah 2003)   When we are more practiced in using mindfulness, we can use it even in of intense distress, by becoming mindful of the actual experience as an observer, using mindful breathing and focussing our attention on the breathing, listening to the distressing thoughts mindfully,recognising them as merely thoughts, breathing with them, allowing them to happen without believing them or arguing with them. If thoughts are too strong or loud, then we can move our attention to ourbreath, the body, or to sounds around us.   Maxim Montero uses the example of waves to help explain mindfulness.   Think of your mind as the surface of a lake or an ocean. There are always waveson the water, sometimes big, sometimes small, sometimes almost imperceptible. The water's waves are churned up by winds, which come and go and vary in direction and intensity, just as do the winds of stressand change in our lives, which stir up waves in our mind. It's possible to find shelter from much of the wind that agitates the mind. Whatever we might do to prevent them, the winds of life and of the mind blow.   \"You can't stop the waves, but you can learn to surf\" (Winston 2004         "

## 2022-02-11 NOTE — ED TRIAGE NOTES
[id number]    Pt is from a group home brought in for complaints of N&V for the past 24 hrs.

## 2022-02-12 ENCOUNTER — HOSPITAL ENCOUNTER (EMERGENCY)
Facility: CLINIC | Age: 22
Discharge: HOME | End: 2022-02-13
Attending: FAMILY MEDICINE | Admitting: EMERGENCY MEDICINE
Payer: MEDICARE

## 2022-02-12 DIAGNOSIS — R45.851 SUICIDAL IDEATION: ICD-10-CM

## 2022-02-12 DIAGNOSIS — R45.89 DEPRESSED MOOD: Primary | ICD-10-CM

## 2022-02-12 LAB — BACTERIA UR CULT: NORMAL

## 2022-02-12 PROCEDURE — 99285 EMERGENCY DEPT VISIT HI MDM: CPT | Performed by: EMERGENCY MEDICINE

## 2022-02-12 PROCEDURE — 99285 EMERGENCY DEPT VISIT HI MDM: CPT | Mod: 25 | Performed by: EMERGENCY MEDICINE

## 2022-02-12 NOTE — ED NOTES
Called [phone] and spoke with Arlene (nurse) at [facility]. Informed patient states that she feels better and is ready to go home. Will call a cab for transport. Sabine Salinas RN on 2/11/2022 at 9:12 PM

## 2022-02-12 NOTE — DISCHARGE INSTRUCTIONS
Thank you for choosing [facility].     Please closely monitor for further symptoms. Return to the Emergency Department if you develop any new or worsening signs or symptoms.    If you received any opiate pain medications or sedatives during your visit, please do not drive for at least 8 hours.     Labs, cultures or final xray interpretations may still need to be reviewed.  We will call you if your plan of care needs to be changed.    Please continue all your current medications, and follow-up with your outpatient providers.

## 2022-02-12 NOTE — ED TRIAGE NOTES
"Patient presents via EMS for nausea/vomiting and suicidal ideation. States, \"I would cut myself\". No self injury noted. Patient states last dose of Zofran was around 2pm. Also reports abdominal cramping. 1:1 sitter with patient. Sabine Salinas RN on 2/11/2022 at 7:41 PM    "

## 2022-02-12 NOTE — ED PROVIDER NOTES
"      [facility] EMERGENCY DEPARTMENT ([facility])    2/11/22          History     Chief Complaint   Patient presents with     Suicidal     Nausea     HPI  Sadaf Ross is a 21 year old female with a past medical history significant for bipolar disorder, borderline personality disorder, depression, ADHD, intellectual disability, s/p appendectomy who presents to the ED for evaluation of suicidal ideation and nausea. She states she feels nauseated frequently, in fact this occurs almost every day. She is uncertain of any provocative or palliative factors. He does admit she has been feeling worse recently as there is an anniversary of the death of a family member coming up. She had suicidal thoughts about cutting herself earlier today. She admits to having these type of thoughts almost every day.  Was seen here yesterday for similar complaints. Labs including COVID and pregnancy test were negative. Blood cell count 11.3 otherwise no significant metabolic abnormality. Patient states she feels \"about the same\" as she felt yesterday. She did not actually attempt to hurt herself today.      Past Medical History  Past Medical History:   Diagnosis Date     ADHD (attention deficit hyperactivity disorder)      Bipolar 1 disorder (H)      Borderline personality disorder (H)      Depression      Depressive disorder      Intellectual disability      Obesity      Syncope      Past Surgical History:   Procedure Laterality Date     APPENDECTOMY       APPENDECTOMY       ondansetron (ZOFRAN) 4 MG tablet  ARIPiprazole ER (ABILIFY MAINTENA) 400 MG extended release inj syringe  benztropine (COGENTIN) 0.5 MG tablet  calcium carbonate (TUMS) 500 MG chewable tablet  chlorhexidine (CHLORHEXIDINE) 0.12 % solution  docusate sodium (COLACE) 100 MG capsule  hydrOXYzine (ATARAX) 50 MG tablet  metoclopramide (REGLAN) 10 MG tablet  naltrexone (DEPADE/REVIA) 50 MG tablet  norgestimate-ethinyl estradiol (SPRINTEC 28) 0.25-35 MG-MCG " "tablet  OLANZapine (ZYPREXA) 2.5 MG tablet  OLANZapine zydis (ZYPREXA) 5 MG ODT  omeprazole (PRILOSEC) 40 MG DR capsule  polyethylene glycol (MIRALAX) 17 g packet  prochlorperazine (COMPAZINE) 10 MG tablet  promethazine (PHENERGAN) 25 MG suppository  venlafaxine (EFFEXOR-XR) 150 MG 24 hr capsule  Vitamin D, Cholecalciferol, 25 MCG (1000 UT) TABS      Allergies   Allergen Reactions     Penicillins Rash and Unknown     Family History  Family History   Problem Relation Age of Onset     Diabetes Type 1 Father      Cancer Paternal Grandfather      Social History   Social History     Tobacco Use     Smoking status: Current Some Day Smoker     Packs/day: 1.00     Years: 5.00     Pack years: 5.00     Types: Cigarettes     Smokeless tobacco: Never Used   Substance Use Topics     Alcohol use: No     Drug use: No      Past medical history, past surgical history, medications, allergies, family history, and social history were reviewed with the patient. No additional pertinent items.       Review of Systems  A complete review of systems was performed with pertinent positives and negatives noted in the HPI, and all other systems negative.    Physical Exam   BP: (!) 154/107  Pulse: 95  Temp: 98.9  F (37.2  C)  Resp: 16  Height: 162.6 cm (5' 4\")  Weight: 98.9 kg (218 lb)  SpO2: 99 %  Physical Exam  Vitals and nursing note reviewed.   Constitutional:       General: She is not in acute distress.     Appearance: She is not diaphoretic.   HENT:      Head: Atraumatic.      Mouth/Throat:      Pharynx: No oropharyngeal exudate.   Eyes:      General: No scleral icterus.     Pupils: Pupils are equal, round, and reactive to light.   Cardiovascular:      Heart sounds: Normal heart sounds.   Pulmonary:      Effort: No respiratory distress.      Breath sounds: Normal breath sounds.   Abdominal:      General: Bowel sounds are normal.      Palpations: Abdomen is soft.      Tenderness: There is abdominal tenderness in the epigastric area. There is " no guarding or rebound.   Musculoskeletal:         General: No tenderness.   Skin:     General: Skin is warm.      Findings: No rash.   Psychiatric:         Attention and Perception: Attention normal.         Mood and Affect: Mood is anxious.         Speech: Speech normal.         Behavior: Behavior normal.         Thought Content: Thought content includes suicidal ideation.         Cognition and Memory: Cognition is impaired (Baseline intellectual disability).         Judgment: Judgment is impulsive.         ED Course      Procedures       The medical record was reviewed and interpreted.  Previous labs reviewed and interpreted.  Mental Health Risk Assessment      PSS-3    Date and Time Over the past 2 weeks have you felt down, depressed, or hopeless? Over the past 2 weeks have you had thoughts of killing yourself? Have you ever attempted to kill yourself? When did this last happen? User   02/11/22 1935 yes yes yes more than 6 months ago AI      C-SSRS ([place])    Date and Time Q1 Wished to be Dead (Past Month) Q2 Suicidal Thoughts (Past Month) Q3 Suicidal Thought Method Q4 Suicidal Intent without Specific Plan Q5 Suicide Intent with Specific Plan Q6 Suicide Behavior (Lifetime) Within the Past 3 Months? RETIRED: Level of Risk per Screen Screening Not Complete User   02/11/22 1935 yes yes yes yes yes yes -- -- -- AI                     Results for orders placed or performed during the hospital encounter of 02/10/22   UA reflex to Microscopic     Status: Abnormal   Result Value Ref Range    Color Urine Light Yellow Colorless, Straw, Light Yellow, Yellow    Appearance Urine Clear Clear    Glucose Urine Negative Negative mg/dL    Bilirubin Urine Negative Negative    Ketones Urine Negative Negative mg/dL    Specific Gravity Urine 1.019 1.003 - 1.035    Blood Urine Negative Negative    pH Urine 5.5 5.0 - 7.0    Protein Albumin Urine Negative Negative mg/dL    Urobilinogen Urine Normal Normal, 2.0 mg/dL    Nitrite Urine  Negative Negative    Leukocyte Esterase Urine Large (A) Negative    Bacteria Urine Few (A) None Seen /HPF    RBC Urine 3 (H) <=2 /HPF    WBC Urine 4 <=5 /HPF    Squamous Epithelials Urine 8 (H) <=1 /HPF    Mucus Urine Present (A) None Seen /LPF   Drug abuse screen 1 urine (ED)     Status: Normal   Result Value Ref Range    Amphetamines Urine Screen Negative Screen Negative    Barbiturates Urine Screen Negative Screen Negative    Benzodiazepines Urine Screen Negative Screen Negative    Cannabinoids Urine Screen Negative Screen Negative    Cocaine Urine Screen Negative Screen Negative    Opiates Urine Screen Negative Screen Negative   Comprehensive metabolic panel     Status: Abnormal   Result Value Ref Range    Sodium 140 133 - 144 mmol/L    Potassium 4.2 3.4 - 5.3 mmol/L    Chloride 113 (H) 94 - 109 mmol/L    Carbon Dioxide (CO2) 21 20 - 32 mmol/L    Anion Gap 6 3 - 14 mmol/L    Urea Nitrogen 12 7 - 30 mg/dL    Creatinine 0.77 0.52 - 1.04 mg/dL    Calcium 9.2 8.5 - 10.1 mg/dL    Glucose 92 70 - 99 mg/dL    Alkaline Phosphatase 53 40 - 150 U/L    AST 23 0 - 45 U/L    ALT 38 0 - 50 U/L    Protein Total 7.9 6.8 - 8.8 g/dL    Albumin 3.8 3.4 - 5.0 g/dL    Bilirubin Total 0.2 0.2 - 1.3 mg/dL    GFR Estimate >90 >60 mL/min/1.73m2   Lipase     Status: Normal   Result Value Ref Range    Lipase 156 73 - 393 U/L   UA with Microscopic reflex to Culture     Status: Abnormal    Specimen: Urine, Midstream   Result Value Ref Range    Color Urine Light Yellow Colorless, Straw, Light Yellow, Yellow    Appearance Urine Clear Clear    Glucose Urine Negative Negative mg/dL    Bilirubin Urine Negative Negative    Ketones Urine Negative Negative mg/dL    Specific Gravity Urine 1.019 1.003 - 1.035    Blood Urine Negative Negative    pH Urine 5.5 5.0 - 7.0    Protein Albumin Urine Negative Negative mg/dL    Urobilinogen Urine Normal Normal, 2.0 mg/dL    Nitrite Urine Negative Negative    Leukocyte Esterase Urine Large (A) Negative     Bacteria Urine Few (A) None Seen /HPF    Mucus Urine Present (A) None Seen /LPF    RBC Urine 2 <=2 /HPF    WBC Urine 5 <=5 /HPF    Squamous Epithelials Urine 6 (H) <=1 /HPF    Narrative    Urine Culture ordered based on laboratory criteria   HCG qualitative Blood     Status: Normal   Result Value Ref Range    hCG Serum Qualitative Negative Negative   Asymptomatic COVID-19 Virus (Coronavirus) by PCR Nasopharyngeal     Status: Normal    Specimen: Nasopharyngeal; Swab   Result Value Ref Range    SARS CoV2 PCR Negative Negative    Narrative    Testing was performed using the nestor  SARS-CoV-2 & Influenza A/B Assay on the nestor  Tamar  System.  This test should be ordered for the detection of SARS-COV-2 in individuals who meet SARS-CoV-2 clinical and/or epidemiological criteria. Test performance is unknown in asymptomatic patients.  This test is for in vitro diagnostic use under the FDA EUA for laboratories certified under CLIA to perform moderate and/or high complexity testing. This test has not been FDA cleared or approved.  A negative test does not rule out the presence of PCR inhibitors in the specimen or target RNA in concentration below the limit of detection for the assay. The possibility of a false negative should be considered if the patient's recent exposure or clinical presentation suggests COVID-19.  [facility] are certified under the Clinical Laboratory Improvement Amendments of 1988 (CLIA-88) as qualified to perform moderate and/or high complexity laboratory testing.   CBC with platelets and differential     Status: Abnormal   Result Value Ref Range    WBC Count 11.3 (H) 4.0 - 11.0 10e3/uL    RBC Count 4.53 3.80 - 5.20 10e6/uL    Hemoglobin 12.6 11.7 - 15.7 g/dL    Hematocrit 38.2 35.0 - 47.0 %    MCV 84 78 - 100 fL    MCH 27.8 26.5 - 33.0 pg    MCHC 33.0 31.5 - 36.5 g/dL    RDW 13.0 10.0 - 15.0 %    Platelet Count 294 150 - 450 10e3/uL    % Neutrophils 67 %    % Lymphocytes 27 %    %  Monocytes 5 %    % Eosinophils 1 %    % Basophils 0 %    % Immature Granulocytes 0 %    NRBCs per 100 WBC 0 <1 /100    Absolute Neutrophils 7.5 1.6 - 8.3 10e3/uL    Absolute Lymphocytes 3.1 0.8 - 5.3 10e3/uL    Absolute Monocytes 0.5 0.0 - 1.3 10e3/uL    Absolute Eosinophils 0.2 0.0 - 0.7 10e3/uL    Absolute Basophils 0.0 0.0 - 0.2 10e3/uL    Absolute Immature Granulocytes 0.0 <=0.4 10e3/uL    Absolute NRBCs 0.0 10e3/uL   Urine Drugs of Abuse Screen     Status: Normal    Narrative    The following orders were created for panel order Urine Drugs of Abuse Screen.  Procedure                               Abnormality         Status                     ---------                               -----------         ------                     Drug abuse screen 1 urin...[[id number]]  Normal              Final result                 Please view results for these tests on the individual orders.   CBC with platelets differential     Status: Abnormal    Narrative    The following orders were created for panel order CBC with platelets differential.  Procedure                               Abnormality         Status                     ---------                               -----------         ------                     CBC with platelets and d...[[id number]]  Abnormal            Final result                 Please view results for these tests on the individual orders.     Medications   ondansetron (ZOFRAN-ODT) ODT tab 4 mg (4 mg Oral Given 2/11/22 1958)   OLANZapine zydis (zyPREXA) ODT tab 5 mg (5 mg Oral Given 2/11/22 1958)        Assessments & Plan (with Medical Decision Making)   Patient with multiple previous mental health diagnosis including borderline personality disorder, bipolar disorder, intellectual disability, and chronic suicidal ideation. She is a resident of group [facility] where staff work to keep her safe from these chronic impulses. Frequently reacts to stress, and recently has been more depressed due to the anniversary  of her relatives death. She becomes emotionally dysregulated. Here in the ED she seems under better emotional control. She has a benign abdominal exam. She had labs yesterday which were unremarkable including pregnancy test, Covid test, metabolic panel, and urinalysis. Based on her clinical exam today I do not think repeating labs would be helpful or indicated.  She was given a dose of Zyprexa and Zofran.  She slept comfortably, and upon awakening told me she felt better and wanted to go back to the group home.  She believes she can be safe.  This is somewhat chronic behavioral I do not think a short-term hospitalization would mitigate her current risk for self injury.  Group [facility] agreed to take her back.  She will follow up with her regular outpatient providers.    I have reviewed the nursing notes. I have reviewed the findings, diagnosis, plan and need for follow up with the patient.    New Prescriptions    No medications on file       Final diagnoses:   Anxiety   Nausea   Borderline personality disorder (H)       --  Leo Lowe MD  [facility] EMERGENCY DEPARTMENT  2/11/2022     Leo Lowe MD  02/11/22 0582

## 2022-02-12 NOTE — ED NOTES
Bed: HW01  Expected date: 2/11/22  Expected time:   Means of arrival:   Comments:  N720 21 y/o F  SI emesis

## 2022-02-13 ENCOUNTER — HOSPITAL ENCOUNTER (EMERGENCY)
Facility: CLINIC | Age: 22
Discharge: NURSING, REHAB OR LONG-TERM CARE | End: 2022-02-13
Attending: EMERGENCY MEDICINE | Admitting: EMERGENCY MEDICINE
Payer: MEDICARE

## 2022-02-13 ENCOUNTER — NURSE TRIAGE (OUTPATIENT)
Dept: NURSING | Facility: CLINIC | Age: 22
End: 2022-02-13

## 2022-02-13 VITALS
HEART RATE: 76 BPM | DIASTOLIC BLOOD PRESSURE: 78 MMHG | SYSTOLIC BLOOD PRESSURE: 117 MMHG | TEMPERATURE: 98.1 F | RESPIRATION RATE: 16 BRPM | OXYGEN SATURATION: 100 %

## 2022-02-13 VITALS
DIASTOLIC BLOOD PRESSURE: 85 MMHG | OXYGEN SATURATION: 100 % | RESPIRATION RATE: 16 BRPM | SYSTOLIC BLOOD PRESSURE: 135 MMHG | TEMPERATURE: 98.1 F | HEART RATE: 76 BPM

## 2022-02-13 DIAGNOSIS — F33.1 MAJOR DEPRESSIVE DISORDER, RECURRENT EPISODE, MODERATE (H): ICD-10-CM

## 2022-02-13 DIAGNOSIS — F60.3 BORDERLINE PERSONALITY DISORDER (H): ICD-10-CM

## 2022-02-13 DIAGNOSIS — F60.3 EXPLOSIVE PERSONALITY DISORDER (H): ICD-10-CM

## 2022-02-13 DIAGNOSIS — F31.62 MODERATE MIXED BIPOLAR I DISORDER (H): ICD-10-CM

## 2022-02-13 LAB
ALBUMIN SERPL-MCNC: 4.1 G/DL (ref 3.4–5)
ALP SERPL-CCNC: 56 U/L (ref 40–150)
ALT SERPL W P-5'-P-CCNC: 46 U/L (ref 0–50)
AMPHETAMINES UR QL SCN: NORMAL
ANION GAP SERPL CALCULATED.3IONS-SCNC: 5 MMOL/L (ref 3–14)
AST SERPL W P-5'-P-CCNC: 19 U/L (ref 0–45)
BARBITURATES UR QL: NORMAL
BASOPHILS # BLD AUTO: 0 10E3/UL (ref 0–0.2)
BASOPHILS NFR BLD AUTO: 0 %
BENZODIAZ UR QL: NORMAL
BILIRUB SERPL-MCNC: 0.2 MG/DL (ref 0.2–1.3)
BUN SERPL-MCNC: 15 MG/DL (ref 7–30)
CALCIUM SERPL-MCNC: 9.2 MG/DL (ref 8.5–10.1)
CANNABINOIDS UR QL SCN: NORMAL
CHLORIDE BLD-SCNC: 109 MMOL/L (ref 94–109)
CO2 SERPL-SCNC: 23 MMOL/L (ref 20–32)
COCAINE UR QL: NORMAL
CREAT SERPL-MCNC: 0.94 MG/DL (ref 0.52–1.04)
EOSINOPHIL # BLD AUTO: 0.3 10E3/UL (ref 0–0.7)
EOSINOPHIL NFR BLD AUTO: 3 %
ERYTHROCYTE [DISTWIDTH] IN BLOOD BY AUTOMATED COUNT: 12.9 % (ref 10–15)
GFR SERPL CREATININE-BSD FRML MDRD: 88 ML/MIN/1.73M2
GLUCOSE BLD-MCNC: 91 MG/DL (ref 70–99)
HCG UR QL: NEGATIVE
HCT VFR BLD AUTO: 39.6 % (ref 35–47)
HGB BLD-MCNC: 13.2 G/DL (ref 11.7–15.7)
IMM GRANULOCYTES # BLD: 0 10E3/UL
IMM GRANULOCYTES NFR BLD: 0 %
LIPASE SERPL-CCNC: 174 U/L (ref 73–393)
LYMPHOCYTES # BLD AUTO: 3.8 10E3/UL (ref 0.8–5.3)
LYMPHOCYTES NFR BLD AUTO: 36 %
MCH RBC QN AUTO: 28 PG (ref 26.5–33)
MCHC RBC AUTO-ENTMCNC: 33.3 G/DL (ref 31.5–36.5)
MCV RBC AUTO: 84 FL (ref 78–100)
MONOCYTES # BLD AUTO: 0.5 10E3/UL (ref 0–1.3)
MONOCYTES NFR BLD AUTO: 5 %
NEUTROPHILS # BLD AUTO: 5.9 10E3/UL (ref 1.6–8.3)
NEUTROPHILS NFR BLD AUTO: 56 %
NRBC # BLD AUTO: 0 10E3/UL
NRBC BLD AUTO-RTO: 0 /100
OPIATES UR QL SCN: NORMAL
PLATELET # BLD AUTO: 290 10E3/UL (ref 150–450)
POTASSIUM BLD-SCNC: 4.3 MMOL/L (ref 3.4–5.3)
PROT SERPL-MCNC: 8.4 G/DL (ref 6.8–8.8)
RBC # BLD AUTO: 4.71 10E6/UL (ref 3.8–5.2)
SODIUM SERPL-SCNC: 137 MMOL/L (ref 133–144)
WBC # BLD AUTO: 10.5 10E3/UL (ref 4–11)

## 2022-02-13 PROCEDURE — 83690 ASSAY OF LIPASE: CPT | Performed by: EMERGENCY MEDICINE

## 2022-02-13 PROCEDURE — 36415 COLL VENOUS BLD VENIPUNCTURE: CPT | Performed by: EMERGENCY MEDICINE

## 2022-02-13 PROCEDURE — 90791 PSYCH DIAGNOSTIC EVALUATION: CPT

## 2022-02-13 PROCEDURE — 250N000011 HC RX IP 250 OP 636: Performed by: EMERGENCY MEDICINE

## 2022-02-13 PROCEDURE — 80053 COMPREHEN METABOLIC PANEL: CPT | Performed by: EMERGENCY MEDICINE

## 2022-02-13 PROCEDURE — 99285 EMERGENCY DEPT VISIT HI MDM: CPT | Performed by: EMERGENCY MEDICINE

## 2022-02-13 PROCEDURE — 96372 THER/PROPH/DIAG INJ SC/IM: CPT | Performed by: EMERGENCY MEDICINE

## 2022-02-13 PROCEDURE — 99285 EMERGENCY DEPT VISIT HI MDM: CPT | Mod: 25

## 2022-02-13 PROCEDURE — 81025 URINE PREGNANCY TEST: CPT | Performed by: EMERGENCY MEDICINE

## 2022-02-13 PROCEDURE — 85004 AUTOMATED DIFF WBC COUNT: CPT | Performed by: EMERGENCY MEDICINE

## 2022-02-13 PROCEDURE — 80307 DRUG TEST PRSMV CHEM ANLYZR: CPT | Performed by: EMERGENCY MEDICINE

## 2022-02-13 RX ORDER — ONDANSETRON 4 MG/1
4 TABLET, ORALLY DISINTEGRATING ORAL ONCE
Status: COMPLETED | OUTPATIENT
Start: 2022-02-13 | End: 2022-02-13

## 2022-02-13 RX ORDER — OLANZAPINE 5 MG/1
5 TABLET, ORALLY DISINTEGRATING ORAL ONCE
Status: DISCONTINUED | OUTPATIENT
Start: 2022-02-13 | End: 2022-02-13

## 2022-02-13 RX ORDER — OLANZAPINE 10 MG/2ML
5 INJECTION, POWDER, FOR SOLUTION INTRAMUSCULAR ONCE
Status: COMPLETED | OUTPATIENT
Start: 2022-02-13 | End: 2022-02-13

## 2022-02-13 RX ORDER — ONDANSETRON 2 MG/ML
4 INJECTION INTRAMUSCULAR; INTRAVENOUS EVERY 30 MIN PRN
Status: DISCONTINUED | OUTPATIENT
Start: 2022-02-13 | End: 2022-02-13

## 2022-02-13 RX ADMIN — ONDANSETRON 4 MG: 4 TABLET, ORALLY DISINTEGRATING ORAL at 02:09

## 2022-02-13 RX ADMIN — OLANZAPINE 5 MG: 10 INJECTION, POWDER, LYOPHILIZED, FOR SOLUTION INTRAMUSCULAR at 16:21

## 2022-02-13 ASSESSMENT — ENCOUNTER SYMPTOMS
SHORTNESS OF BREATH: 0
FEVER: 0
VOMITING: 1
ABDOMINAL PAIN: 1

## 2022-02-13 NOTE — ED PROVIDER NOTES
"    [facility] EMERGENCY DEPARTMENT ([facility])       2/13/22  History     Chief Complaint   Patient presents with     Suicidal     SI with plan to cut self     HPI  Sadaf Ross is a 21 year old female with a past medical history significant for borderline personality disorder, bipolar 1 disorder, MDD, ADHD, and intellectual disability who presents to the Emergency Department for evaluation of suicidal ideation.  Patient was just discharged from our emergency department a few hours ago at 9 AM today.  This is now her fifth visit to the emergency department in the last 5 days.  She has very chronic suicidality which has been documented multiple times in her past charts and according to the behavioral health 's who have seen her previously.  She reportedly told her group home staff member that she was feeling suicidal again and wanted to cut herself or banging her head against the wall.  Thus they called the nurse line who recommended they call 911 and she was transported here.  Patient here is requesting IM Zyprexa.  This is the anniversary of her father's death which is causing her stress.  Patient was evaluated by the behavioral health .  Please see their note for further details.  She denied any medical complaints.    Per previous DEC assessment: \"Patient has an extensive history of ED visits, including 14 visits so far in 2022. Patient has previously reported that she enjoys ambulance rides....Patient's , Arlene, reported that pt does not have access to anything harmful. Patient has an extensive care team in place with outpatient therapy in an [facility] worker, medication management through [facility], a PCP through [facility] (no specific provider noted), and a  [facility] at [phone].\"      Past Medical History  Past Medical History:   Diagnosis Date     ADHD (attention deficit hyperactivity disorder)      Bipolar 1 disorder (H)      Borderline " personality disorder (H)      Depression      Depressive disorder      Intellectual disability      Obesity      Syncope      Past Surgical History:   Procedure Laterality Date     APPENDECTOMY       APPENDECTOMY       ARIPiprazole ER (ABILIFY MAINTENA) 400 MG extended release inj syringe  benztropine (COGENTIN) 0.5 MG tablet  calcium carbonate (TUMS) 500 MG chewable tablet  docusate sodium (COLACE) 100 MG capsule  hydrOXYzine (ATARAX) 50 MG tablet  metoclopramide (REGLAN) 10 MG tablet  naltrexone (DEPADE/REVIA) 50 MG tablet  OLANZapine (ZYPREXA) 2.5 MG tablet  omeprazole (PRILOSEC) 40 MG DR capsule  ondansetron (ZOFRAN) 4 MG tablet  prochlorperazine (COMPAZINE) 10 MG tablet  promethazine (PHENERGAN) 25 MG suppository  venlafaxine (EFFEXOR-XR) 150 MG 24 hr capsule  Vitamin D, Cholecalciferol, 25 MCG (1000 UT) TABS  chlorhexidine (CHLORHEXIDINE) 0.12 % solution  norgestimate-ethinyl estradiol (SPRINTEC 28) 0.25-35 MG-MCG tablet  OLANZapine zydis (ZYPREXA) 5 MG ODT  polyethylene glycol (MIRALAX) 17 g packet      Allergies   Allergen Reactions     Penicillins Rash and Unknown     Family History  Family History   Problem Relation Age of Onset     Diabetes Type 1 Father      Cancer Paternal Grandfather      Social History   Social History     Tobacco Use     Smoking status: Current Some Day Smoker     Packs/day: 1.00     Years: 5.00     Pack years: 5.00     Types: Cigarettes     Smokeless tobacco: Never Used   Substance Use Topics     Alcohol use: No     Drug use: No      Past medical history, past surgical history, medications, allergies, family history, and social history were reviewed with the patient. No additional pertinent items.     I have reviewed the Medications, Allergies, Past Medical and Surgical History, and Social History in the Epic system.    Review of Systems  A complete review of systems was performed with pertinent positives and negatives noted in the HPI, and all other systems negative.    Physical  Exam   BP: 125/85  Pulse: 101  Temp: 98.3  F (36.8  C)  Resp: 16  SpO2: 96 %      Physical Exam  Vitals reviewed.   Constitutional:       General: She is not in acute distress.     Appearance: She is well-developed.   HENT:      Head: Normocephalic and atraumatic.   Eyes:      Extraocular Movements: Extraocular movements intact.      Conjunctiva/sclera: Conjunctivae normal.      Pupils: Pupils are equal, round, and reactive to light.   Cardiovascular:      Rate and Rhythm: Normal rate and regular rhythm.      Pulses: Normal pulses.      Heart sounds: Normal heart sounds.   Pulmonary:      Effort: Pulmonary effort is normal. No respiratory distress.      Breath sounds: Normal breath sounds.   Abdominal:      General: Bowel sounds are normal. There is no distension.      Palpations: Abdomen is soft.      Tenderness: There is no abdominal tenderness.   Musculoskeletal:         General: Normal range of motion.      Cervical back: Normal range of motion and neck supple.   Skin:     General: Skin is warm and dry.      Capillary Refill: Capillary refill takes less than 2 seconds.   Neurological:      General: No focal deficit present.      Mental Status: She is alert and oriented to person, place, and time.      GCS: GCS eye subscore is 4. GCS verbal subscore is 5. GCS motor subscore is 6.   Psychiatric:      Comments: Manipulative behavior. Intermittently tearful. Now resting after Zyprexa as requested.          ED Course     At 5:45 PM the patient was seen and examined by Day Hope MD.        Procedures       The medical record was reviewed and interpreted.  Previous labs reviewed and interpreted.         Results for orders placed or performed during the hospital encounter of 02/12/22 (from the past 24 hour(s))   CBC with platelets differential    Narrative    The following orders were created for panel order CBC with platelets differential.  Procedure                               Abnormality         Status                      ---------                               -----------         ------                     CBC with platelets and d...[[id number]]                      Final result                 Please view results for these tests on the individual orders.   Comprehensive metabolic panel   Result Value Ref Range    Sodium 137 133 - 144 mmol/L    Potassium 4.3 3.4 - 5.3 mmol/L    Chloride 109 94 - 109 mmol/L    Carbon Dioxide (CO2) 23 20 - 32 mmol/L    Anion Gap 5 3 - 14 mmol/L    Urea Nitrogen 15 7 - 30 mg/dL    Creatinine 0.94 0.52 - 1.04 mg/dL    Calcium 9.2 8.5 - 10.1 mg/dL    Glucose 91 70 - 99 mg/dL    Alkaline Phosphatase 56 40 - 150 U/L    AST 19 0 - 45 U/L    ALT 46 0 - 50 U/L    Protein Total 8.4 6.8 - 8.8 g/dL    Albumin 4.1 3.4 - 5.0 g/dL    Bilirubin Total 0.2 0.2 - 1.3 mg/dL    GFR Estimate 88 >60 mL/min/1.73m2   Lipase   Result Value Ref Range    Lipase 174 73 - 393 U/L   CBC with platelets and differential   Result Value Ref Range    WBC Count 10.5 4.0 - 11.0 10e3/uL    RBC Count 4.71 3.80 - 5.20 10e6/uL    Hemoglobin 13.2 11.7 - 15.7 g/dL    Hematocrit 39.6 35.0 - 47.0 %    MCV 84 78 - 100 fL    MCH 28.0 26.5 - 33.0 pg    MCHC 33.3 31.5 - 36.5 g/dL    RDW 12.9 10.0 - 15.0 %    Platelet Count 290 150 - 450 10e3/uL    % Neutrophils 56 %    % Lymphocytes 36 %    % Monocytes 5 %    % Eosinophils 3 %    % Basophils 0 %    % Immature Granulocytes 0 %    NRBCs per 100 WBC 0 <1 /100    Absolute Neutrophils 5.9 1.6 - 8.3 10e3/uL    Absolute Lymphocytes 3.8 0.8 - 5.3 10e3/uL    Absolute Monocytes 0.5 0.0 - 1.3 10e3/uL    Absolute Eosinophils 0.3 0.0 - 0.7 10e3/uL    Absolute Basophils 0.0 0.0 - 0.2 10e3/uL    Absolute Immature Granulocytes 0.0 <=0.4 10e3/uL    Absolute NRBCs 0.0 10e3/uL   HCG qualitative urine (UPT)   Result Value Ref Range    hCG Urine Qualitative Negative Negative   Urine Drugs of Abuse Screen    Narrative    The following orders were created for panel order Urine Drugs of Abuse  Screen.  Procedure                               Abnormality         Status                     ---------                               -----------         ------                     Drug abuse screen 1 urin...[[id number]]  Normal              Final result                 Please view results for these tests on the individual orders.   Drug abuse screen 1 urine (ED)   Result Value Ref Range    Amphetamines Urine Screen Negative Screen Negative    Barbiturates Urine Screen Negative Screen Negative    Benzodiazepines Urine Screen Negative Screen Negative    Cannabinoids Urine Screen Negative Screen Negative    Cocaine Urine Screen Negative Screen Negative    Opiates Urine Screen Negative Screen Negative     Medications   OLANZapine (zyPREXA) injection 5 mg (5 mg Intramuscular Given 2/13/22 8709)             Assessments & Plan (with Medical Decision Making)   Patient represents after leaving here a few hours ago with history of borderline personality disorder and multiple presentations of the same.  This is her fifth ER visit in 5 days.  She has very chronic similar behavior and suicidality.  She has extensive outpatient resources and is monitored closely in her group home with no access to any items that she can use to harm herself.  She is now been assessed multiple times and was also again seen by the behavioral health  here today.  She did request IM Zyprexa so she was given 5 mg IM here.  This is the anniversary of her father's death which is exacerbating her stress.  The behavioral health  did not feel the patient warranted inpatient admission as this is very chronic behavior and chronic suicidal ideation.  His chronic behavior and chronic suicidality is extensively documented in previous visits.  She is well-known to the emergency department here.  Also she was felt to be appropriate for discharge as she has very good resources in place and is in a group home where she is constantly monitored  without any access to anything that could harm herself.  The behavioral  discussed the patient with the group home staff was willing to take her back.  They stated they would not pick her up here and thus we will have an ambulance bring her back to the group home as she is vulnerable adult. We are awaiting EMS transfer back to her group home at this time. She will be discharged in stable condition.     I have reviewed the nursing notes.    I have reviewed the findings, diagnosis, plan and need for follow up with the patient.    New Prescriptions    No medications on file       Final diagnoses:   Borderline personality disorder (H)       IJami am serving as a trained medical scribe to document services personally performed by Day Hope MD, based on the provider's statements to me.      I, Day Hope MD, was physically present and have reviewed and verified the accuracy of this note documented by Jami Arango.     Day Hope MD  2/13/2022   [facility] EMERGENCY DEPARTMENT     Day Hope MD  02/13/22 2012

## 2022-02-13 NOTE — ED NOTES
"2/13/2022  Sadaf Ross 2000     [facility] Crisis Assessment    Patient was assessed: remote  Patient location: [facility] ED    Referral Data and Chief Complaint  Sadaf is a 21 year old who uses she/her pronouns. Patient presented to the ED via EMS and was referred to the ED by self. Patient is presenting to the ED for the following concerns: suicidal thoughts.      Informed Consent and Assessment Methods    Patient is her own guardian. Writer met with patient and explained the crisis assessment process, including applicable information disclosures and limits to confidentiality, assessed understanding of the process, and obtained consent to proceed with the assessment. Patient was observed to be able to participate in the assessment as evidenced by visual agreement. Assessment methods included conducting a formal interview with patient, review of medical records, collaboration with medical staff, and obtaining relevant collateral information from family and community providers when available.    Narrative Summary of Presenting Problem and Current Functioning  What led to the patient presenting for crisis services, factors that make the crisis life threatening or complex, stressors, how is this disrupting the patient's life, and how current functioning is in comparison to baseline. How is patient presenting during the assessment.     Pt is returning to the hospital after being here this morning due to the same SI. Pt was crying off and on during assessment and reported that she wants to kill herself, \"I want to die to be with my dad.\"  Pt's father passed away last year on 2/17/22 and pt has been very triggered by this anniversary. Pt has an extensive history of ED visits, including 14 visits so far in 2022. Pt has previously reported that she enjoys ambulance rides. Pt lives in a group home. Pt reported that she has a plan to kill herself by hitting her head against the wall, pt reported she did this today. Pt also " reported that she feels safe at her group home and she feels that the staff keep her safe. Pt reported she has access to knives, including a pocket knife that she said was locked up with her medications but she said she is able to get it. Pt's , Arlene, reported that pt does not have access to anything harmful. Patient has an extensive care team in place with outpatient therapy in an [facility] worker, medication management through [facility], a PCP through [facility] (no specific provider noted), and a  Jyoti at [phone]. Pt reported that she enjoys seeing her therapist and finds therapy to be helpful, she also said she thinks her meds a sort of helpful.     History of the Crisis  Duration of the current crisis, coping skills attempted to reduce the crisis, community resources used, and past presentations.    Patient is a chronic user of EMS and the ED, and has presented here for same and similar several times due to her baseline presentation of suicidal ideation. Patient has an extensive care team in place, and has an [facility] appointment tomorrow at 12:30pm at the group home that she wants to be there for. See previous notes as similar baseline behavior and presentation.        Collateral Information  Group home staff - Arlene @ [phone]. Reviewed prior ED presentations. Care Everywhere.  Risk Assessment    Risk of Harm to Self     ESS-6  1.a. Over the past 2 weeks, have you had thoughts of killing yourself? Yes  1.b. Have you ever attempted to kill yourself and, if yes, when did this last happen? No   2. Recent or current suicide plan? Yes to hit head against wall   3. Recent or current intent to act on ideation? No  4. Lifetime psychiatric hospitalization? Yes  5. Pattern of excessive substance use? No  6. Current irritability, agitation, or aggression? No  Scoring note: BOTH 1a and 1b must be yes for it to score 1 point, if both are not yes it is zero. All others are 1 point  per number. If all questions 1a/1b - 6 are no, risk is negligible. If one of 1a/1b is yes, then risk is mild. If either question 2 or 3, but not both, is yes, then risk is automatically moderate regardless of total score. If both 2 and 3 are yes, risk is automatically high regardless of total score.     Score: 2, mild risk    The patient has the following risk factors for suicide: poor decision making, poor impulse control, preoccupied with death/dying, recent loss and restless/agitated    Is the patient experiencing current suicidal ideation: Yes. Plan: to hit head against wall but no intent    Is the patient engaging in preparatory suicide behaviors (formulating how to act on plan, giving away possessions, saying goodbye, displaying dramatic behavior changes, etc)? No    Does the patient have access to firearms or other lethal means? no    The patient has the following protective factors: social support, voluntarily seeking mental health support, displays resiliency , established relationship community mental health provider(s) and safe/stable housing    Support system information: Group home staff and established care providers    Patient strengths: Pt is able to advocate for herself and ask for help.    Does the patient engage in non-suicidal self-injurious behavior (NSSI/SIB)? no    Is the patient vulnerable to sexual exploitation?  Yes pt has an intellectual disabilty     Is the patient experiencing abuse or neglect? no    Is the patient a vulnerable adult? Yes      Risk of Harm to Others  The patient has the following risk factors of harm to others: no risk factors identified    Does the patient have thoughts of harming others? No    Is the patient engaging in sexually inappropriate behavior?  no       Current Substance Abuse    Is there recent substance abuse? no    Was a urine drug screen or blood alcohol level obtained: No       Current Symptoms/Concerns    Symptoms  Attention, hyperactivity, and  impulsivity symptoms present: No    Anxiety symptoms present: No      Appetite symptoms present: No     Behavioral difficulties present: Yes: Agitation     Cognitive impairment symptoms present: Yes: Decision-Making    Depressive symptoms present: Yes Crying or feels like crying, Feelings of hopelessness , Impaired decision making , Sleep disturbance  and Thoughts of suicide/death      Eating disorder symptoms present: No    Learning disabilities, cognitive challenges, and/or developmental disorder symptoms present: Yes: Developmental Incidents, Functional Impairments, Mood and Self-Regulation     Manic/hypomanic symptoms present: No    Personality and interpersonal functioning difficulties present : Yes: Emotional Deregulation, Impaired Impulse Control and Impaired Interpersonal Functioning    Psychosis symptoms present: No      Sleep difficulties present: Yes: Difficulty falling asleep , Difficulty staying sleep  and Excessive sleep     Substance abuse disorder symptoms present: No     Trauma and stressor related symptoms present: No           Mental Status Exam   Affect: Dramatic   Appearance: Appropriate and Disheveled    Attention Span/Concentration: Attentive?    Eye Contact: Engaged   Fund of Knowledge: Delayed    Language /Speech Content: Fluent   Language /Speech Volume: Normal    Language /Speech Rate/Productions: Normal    Recent Memory: Intact   Remote Memory: Variable   Mood: Depressed and Sad    Orientation to Person: Yes    Orientation to Place: Yes   Orientation to Time of Day: Yes    Orientation to Date: Yes    Situation (Do they understand why they are here?): Yes    Psychomotor Behavior: Normal    Thought Content: Clear   Thought Form: Intact       Mental Health and Substance Abuse History    History  Current and historical diagnoses or mental health concerns:  ADHD, Bi Polar Disorder, Borderline Personality Disorder, MDD, Intellectual Disability    Prior  services (inpatient, programmatic care,  outpatient, etc) : Yes Yes Extensive  Patient has an extensive care team with outpatient therapy in an [facility] worker, and medication management through [facility], a PCP through [facility] (no specific provider noted), and a  Jyoti at [phone]. Pt currently lives at a group home ([phone] direct line to skilled nursing)    Has the patient used [facility] crisis team services before?: No    History of substance abuse: No    Prior LIZZETTE services (inpatient, programmatic care, detox, outpatient, etc) : No    History of commitment: No    Family history of MH/LIZZETTE: Yes pt unable to provide details    Trauma history: Yes pt unable to provide details    Medication  Psychotropic medications: Yes. Pt is currently taking   ARIPiprazole ER (ABILIFY MAINTENA) 400 MG extended release inj syringe. Medication compliant: Yes. Recent medication changes: No    Current Care Team  Primary Care Provider: Yes. Name: [facility]. Location: [place]. Date of last visit: unknown. Frequency: unknown. Perceived helpfulness: yes.    Psychiatrist: Yes. Name: Emma Jacobson. [facility] [place]. Date of last visit: unknown. Frequency: unknown. Perceived helpfulness: yes.    Therapist: Yes. Name: Shannen Martin. [facility] [place] ([phone]). Date of last visit: last week. Frequency: weekly. Perceived helpfulness: helpful.    : Yes. Name: Jyoti Malik. Location: [facility] ([phone]). Date of last visit: unknown. Frequency: unknown. Perceived helpfulness: unknown.    CTSS or ARMHS: [facility]    ACT Team: No    Other: No    Release of Information  Was a release of information signed: No. Reason: declined      Biopsychosocial Information    Socioeconomic Information  Current living situation: group home    Employment/income source: SSDA    Relevant legal issues: Dismissed assault charge    Cultural, Jehovah's witness, or spiritual influences on mental  health care: none reported    Is the patient active in the  or a : No      Relevant Medical Concerns   Patient identifies concerns with completing ADLs? No     Patient can ambulate independently? Yes     Other medical concerns? No     History of concussion or TBI? No        Diagnosis       F33.1 Major Depressive Disorder, Recurrent, Moderate - by history      F60.3 Borderline Personality Disorder - by history       F31.62 Bi Polar Disorder, Current episode mixed - moderate      Therapeutic Intervention  The following therapeutic methodologies were employed when working with the patient: establishing rapport, active listening, assessing dimensions of crisis, solution focused brief therapy, identifying additional supports and alternative coping skills, establishing a discharge plan, safety planning and brief supportive therapy. Patient response to intervention: postive.      Disposition  Recommended disposition: Group home    Reviewed case and recommendations with attending provider. Attending Name: Dr. Coelho      Attending concurs with disposition: Yes      Patient concurs with disposition: Yes      Guardian concurs with disposition: NA     Final disposition: Group home.         Clinical Substantiation of Recommendations   Rationale with supporting factors for disposition and diagnosis.     Patient reports concerns of SIB and suicidal thoughts at the upcoming anniversary of her fathers death. Patient has an extensive history of presenting to the ED with same and/or similar issues that are recognized as baseline behavior. Pt wants to stay at the hospital but does not meet criteria. Group home has been contacted and are ready for pt to return home. Patient has an extensive care team in place. Final disposition is a return to her group home.    Assessment Details  Patient interview started at: 3:25pm and completed at: 3:38pm.    Total duration spent on the patient case in minutes: 1.50 hrs     CPT  code(s) utilized: 82871 - Psychotherapy for Crisis - 60 (30-74*) min and 67792 - Psychotherapy for Crisis (Each additional 30 minutes) - 30 min        Aftercare and Safety Planning  Follow up plans with MH/LIZZETTE services: Yes pt will follow up with established providers      Aftercare plan placed in the AVS and provided to patient: Yes. Given to patient by ZEESHAN Mantilla      Aftercare Plan  If I am feeling unsafe or I am in a crisis, I will:   Contact my established care providers   Call the National Suicide Prevention Lifeline: 529.948.1584   Go to the nearest emergency room   Call 911     Warning signs that I or other people might notice when a crisis is developing for me: I want to call 911    Things I am able to do on my own to cope or help me feel better: listen to music, sleep, go for a walk, play basketball or football     Things that I am able to do with others to cope or help me better: talk to group home staff, talk to housemates    Things I can use or do for distraction: stuffed animals, listen to music, watch TV, be around other people     Changes I can make to support my mental health and wellness: keep appointments with mental health providers, use skills rather than call 911    People in my life that I can ask for help: staff, housemates, sister    Your [facility] has a mental health crisis team you can call 24/7: [facility]  [phone] (adults)  [phone] (children)    Other things that are important when I m in crisis: Do not call 911 unless it is an emergency    Additional resources and information: Using mindfulness to cope with negative experiences (thoughts, feelings, events)   As we become morepractised at using mindfulness for breathing, body sensations and routine daily activities, so we can then learn to be mindful of our thoughts and feelings, to become observers, and then more accepting ofthem. This results in less distressing feelings, and increases our  "ability to enjoy our lives.   With mindfulness, even the most disturbing sensations, feelings, thoughts, and experiences, can be viewedfrom a wider perspective as passing events in the mind, rather than as \"us\", or as being necessarily true. (Josiah 2003)   When we are more practiced in using mindfulness, we can use it even in of intense distress, by becoming mindful of the actual experience as an observer, using mindful breathing and focussing our attention on the breathing, listening to the distressing thoughts mindfully,recognising them as merely thoughts, breathing with them, allowing them to happen without believing them or arguing with them. If thoughts are too strong or loud, then we can move our attention to ourbreath, the body, or to sounds around us.   Maxim Montero uses the example of waves to help explain mindfulness.   Think of your mind as the surface of a lake or an ocean. There are always waveson the water, sometimes big, sometimes small, sometimes almost imperceptible. The water's waves are churned up by winds, which come and go and vary in direction and intensity, just as do the winds of stressand change in our lives, which stir up waves in our mind. It's possible to find shelter from much of the wind that agitates the mind. Whatever we might do to prevent them, the winds of life and of the mind blow.   \"You can't stop the waves, but you can learn to surf\" ([place]ston 2004     Crisis Lines  Crisis Text Line  Text 964614  You will be connected with a trained live crisis counselor to provide support.    Por espanol, texto  SIRENA a 254941 o texto a 442-AYUDAME en [facility] Hope Line  1.800.SUICIDE [6602263]      Community Resources  Fast Tracker  Linking people to mental health and substance use disorder resources  fastEndomondockermn.org     [place] [facility] Warm Line  Peer to peer support  Monday thru Saturday, 12 pm to 10 pm  772.762.5993 or 3.774.744.7996  Text \"Support\" to " 17754    National [place] [employer] Mental Illness (MAGY)  154.548.4859 or 1.888.[facility].HELPS        Mental Health Apps  My3  https://myMygisticspp.org/    VirtualHopeBox  https://[employer].org/apps/virtual-hope-box/

## 2022-02-13 NOTE — ED NOTES
"2/12/2022  Sadaf Ross 2000     [facility] Crisis Assessment    Patient was assessed: remote  Patient location: ED [facility]     Referral Data and Chief Complaint  Sadaf is a 21 year old who uses she/her pronouns. Patient presented to the ED via EMS and was referred to the ED by self. Patient is presenting to the ED for the following concerns: suicidal ideation.    Per ED RN on 2/12/2022:  Pt called EMS on her own.  Pt struggling with upcoming anniversary of fathers death.  Pt was out shoppping and pt decided she was more sad once she got back to . Pt states she was suicidal with plan to cut self.  Pt states staff at  took knife home.  PT states she is med compliant. EMS states pt also complained of \"belly pain\" for days (emesis yesterday).     Informed Consent and Assessment Methods    Patient is her own guardian. Writer met with patient and explained the crisis assessment process, including applicable information disclosures and limits to confidentiality, assessed understanding of the process, and obtained consent to proceed with the assessment. Patient was observed to be able to participate in the assessment as evidenced by cooperative. Assessment methods included conducting a formal interview with patient, review of medical records, collaboration with medical staff, and obtaining relevant collateral information from family and community providers when available.    Narrative Summary of Presenting Problem and Current Functioning  What led to the patient presenting for crisis services, factors that make the crisis life threatening or complex, stressors, how is this disrupting the patient's life, and how current functioning is in comparison to baseline. How is patient presenting during the assessment.     From DEC assessment done on 2/11/2022 by [facility] Mario Andrew:   Patient states she was having suicidal ideation and thoughts of cutting herself due to the approaching anniversary of her " father's death on February 17. Spoke with group home staff Arlene who stated that patient would not take medications at the house and insisted on coming to the ED due to experiencing nausea. Patient has had 8 previous ED visits to [facility] for SI and SIB. Pt's SI and SIB is baseline for her behavior. Patient stated that she feels safe at the group home and that staff is good about taking care of her. Patient has referred to prior ED visits and ambulance rides as entertaining. Patient has an extensive care team in place with outpatient therapy in an [facility] worker, medication management through [facility], a PCP through [facility] (no specific provider noted), and a  Jyoti at [phone]. Pt currently lives at a group home ([phone] direct line to [facility]). Patient stated she wants to return to the group home and staff agrees to accept patient on her return. ED will arrange.    History of the Crisis  Duration of the current crisis, coping skills attempted to reduce the crisis, community resources used, and past presentations.    Pt is a frequent visitor of the ED via EMS from her group home. Pt is here for the same symptoms and presentations as last ED visit on 2/11/2022, therefore some of the information from that visit has been used for this DEC assessment, and is cited.     Collateral Information  Group [place] staff: [phone]    Risk Assessment    Risk of Harm to Self     ESS-6  1.a. Over the past 2 weeks, have you had thoughts of killing yourself? Yes  1.b. Have you ever attempted to kill yourself and, if yes, when did this last happen? No   2. Recent or current suicide plan? No   3. Recent or current intent to act on ideation? No  4. Lifetime psychiatric hospitalization? Yes  5. Pattern of excessive substance use? No  6. Current irritability, agitation, or aggression? No  Scoring note: BOTH 1a and 1b must be yes for it to score 1 point, if both are not yes it is zero. All others are 1 point  per number. If all questions 1a/1b - 6 are no, risk is negligible. If one of 1a/1b is yes, then risk is mild. If either question 2 or 3, but not both, is yes, then risk is automatically moderate regardless of total score. If both 2 and 3 are yes, risk is automatically high regardless of total score.     Score: 2, mild risk    The patient has the following risk factors for suicide: depressive symptoms, poor decision making, poor impulse control, prior suicide attempt, recent loss, restless/agitated and family disruption    Is the patient experiencing current suicidal ideation: Yes. Plan: cut self  but no intent    Is the patient engaging in preparatory suicide behaviors (formulating how to act on plan, giving away possessions, saying goodbye, displaying dramatic behavior changes, etc)? No    Does the patient have access to firearms or other lethal means? no    The patient has the following protective factors: social support, established relationship community mental health provider(s), future focused thinking and sense of obligation to people/pets, stable housing    Support system information: grandma, uncle, sister, group home staff    Patient strengths: calm, cooperative, engages with services, kind     Does the patient engage in non-suicidal self-injurious behavior (NSSI/SIB)? no    Is the patient vulnerable to sexual exploitation?  No    Is the patient experiencing abuse or neglect? No    Is the patient a vulnerable adult? Yes      Risk of Harm to Others  The patient has the following risk factors of harm to others: None     Does the patient have thoughts of harming others? No    Is the patient engaging in sexually inappropriate behavior?  no       Current Symptoms/Concerns    Symptoms  Attention, hyperactivity, and impulsivity symptoms present: No    Anxiety symptoms present: No      Appetite symptoms present: No     Behavioral difficulties present: No     Cognitive impairment symptoms present: No    Depressive  symptoms present: No    Eating disorder symptoms present: No    Learning disabilities, cognitive challenges, and/or developmental disorder symptoms present: Yes: Developmental Incidents, Functional Impairments, Mood and Self-Regulation     Manic/hypomanic symptoms present: No    Personality and interpersonal functioning difficulties present : Yes: Emotional Deregulation, Impaired Impulse Control and Impaired Interpersonal Functioning    Psychosis symptoms present: No      Sleep difficulties present: No    Substance abuse disorder symptoms present: No     Trauma and stressor related symptoms present: No           Mental Status Exam   Affect: Appropriate   Appearance: Appropriate    Attention Span/Concentration: Attentive?    Eye Contact: Avoidant   Fund of Knowledge: Appropriate    Language /Speech Content: Fluent   Language /Speech Volume: Normal    Language /Speech Rate/Productions: Normal    Recent Memory: Intact   Remote Memory: Intact   Mood: Normal and Sad    Orientation to Person: Yes    Orientation to Place: Yes   Orientation to Time of Day: Yes    Orientation to Date: Yes    Situation (Do they understand why they are here?): Yes    Psychomotor Behavior: Normal    Thought Content: Clear   Thought Form: Intact       Mental Health and Substance Abuse History    History  Current and historical diagnoses or mental health concerns: ADHD, bipolar disorder, borderline personality disorder, MDD, intellectual disability     Prior MH services (inpatient, programmatic care, outpatient, etc) : Yes multiple providers, see below    History of substance abuse: No    Prior LIZZETTE services (inpatient, programmatic care, detox, outpatient, etc) : No    History of commitment: No    Family history of MH/LIZZETTE: Yes see notes     Trauma history: Yes  see notes     Medication     Current Outpatient Medications   Medication     ARIPiprazole ER (ABILIFY MAINTENA) 400 MG extended release inj syringe     benztropine (COGENTIN) 0.5 MG tablet      calcium carbonate (TUMS) 500 MG chewable tablet     chlorhexidine (CHLORHEXIDINE) 0.12 % solution     docusate sodium (COLACE) 100 MG capsule     hydrOXYzine (ATARAX) 50 MG tablet     metoclopramide (REGLAN) 10 MG tablet     naltrexone (DEPADE/REVIA) 50 MG tablet     norgestimate-ethinyl estradiol (SPRINTEC 28) 0.25-35 MG-MCG tablet     OLANZapine (ZYPREXA) 2.5 MG tablet     OLANZapine zydis (ZYPREXA) 5 MG ODT     omeprazole (PRILOSEC) 40 MG DR capsule     ondansetron (ZOFRAN) 4 MG tablet     polyethylene glycol (MIRALAX) 17 g packet     prochlorperazine (COMPAZINE) 10 MG tablet     promethazine (PHENERGAN) 25 MG suppository     venlafaxine (EFFEXOR-XR) 150 MG 24 hr capsule     Vitamin D, Cholecalciferol, 25 MCG (1000 UT) TABS          Current Care Team  [facility]     Psychiatrist: Yes. Name: Emma Jacobson. Location: [facility]. Date of last visit: unknown. Frequency: unknown. Perceived helpfulness: yes.    Therapist: Yes. Name: Shannen Varela . Location: [facility]. Date of last visit: unknown. Frequency: unknow. Perceived helpfulness: yes.    : Yes. Name: Jyoti Malik . Location: [facility] [phone]. Date of last visit: unknown. Frequency: unknown. Perceived helpfulness: unknown.    CTSS or ARMHS: ARMHS provided through [facility]     ACT Team: No    Other: No    Release of Information  Was a release of information signed: No. Reason: RN to obtain       Biopsychosocial Information    Socioeconomic Information  Current living situation: Lives in a group home [phone]    Employment/income source: SSDA     Relevant legal issues: Assault charge, which was dismissed     Cultural, Rastafari, or spiritual influences on mental health care: none     Is the patient active in the  or a : No      Relevant Medical Concerns   Patient identifies concerns with completing ADLs? No     Patient can ambulate  independently? Yes     Other medical concerns? No     History of concussion or TBI? No        Diagnosis    296.32 (F33.1) Major Depressive Disorder, Recurrent Episode, Moderate _ and With mixed features - by history     301.83 (F60.3) Borderline Personality Disorder - by history         Therapeutic Intervention  The following therapeutic methodologies were employed when working with the patient: establishing rapport, active listening, identifying additional supports and alternative coping skills, establishing a discharge plan, safety planning, DBT skills and treatment planning. Patient response to intervention: calm and cooperative, agrees with discharge plan back to group home.      Disposition  Recommended disposition: Group Home       Reviewed case and recommendations with attending provider. Attending Name: Caleb       Attending concurs with disposition: Yes      Patient concurs with disposition: Yes      Guardian concurs with disposition: NA     Final disposition: Group home         Clinical Substantiation of Recommendations   Rationale with supporting factors for disposition and diagnosis.       Patient reports concerns of SIB and suicidal thoughts at the upcoming anniversary of her fathers death. Patient has an extensive history of presenting to the ED with same and/or similar issues that are recognized as baseline behavior  Group home is contacted and is willing to have patient return. Patient has an extensive care team in place. Final disposition is a return to her group home.     Assessment Details  Patient interview started at: 7:15 and completed at: 7:40.    Total duration spent on the patient case in minutes: 1.25 hrs     CPT code(s) utilized: 62701 - Psychotherapy for Crisis - 60 (30-74*) min       Aftercare and Safety Planning  Follow up plans with MH/LIZZETTE services: Yes Pt to meet with current providers       Aftercare plan placed in the AVS and provided to patient: Yes. Given to patient by  EZEQUIEL Santana, MSW, [facility]            Aftercare Plan  If I am feeling unsafe or I am in a crisis, I will:   Contact my established care providers   Call the National Suicide Prevention Lifeline: 413.727.3216   Go to the nearest emergency room   Call 911     Warning signs that I or other people might notice when a crisis is developing for me: I want to call 911    Things I am able to do on my own to cope or help me feel better: listen to music: country, heavy metal, sleep, going for a walk, take a shower     Things that I am able to do with others to cope or help me better: talk to people, going for a walk     Things I can use or do for distraction: stuffed animals, coping skills, watch tv, engage with group home staff     Changes I can make to support my mental health and wellness: Keep appointments with mental health providers, use skills rather than call 911 and go to ED      People in my life that I can ask for help: grandma, uncle, sister     Your [facility] has a mental health crisis team you can call 24/7: [facility] Crisis  [phone] (adults)  [phone] (children)    Other things that are important when I m in crisis: Not to use 911 unless it is an emergency.      Additional resources and information:   What is Mindfulness?   Mindfulness is an ancient eastern practice which is very relevant for our lives today.Mindfulness is a very simple concept. Mindfulness means paying attention in a particular way: on purpose, in the present moment, and non-judgementally.   Mindfulness does not conflict with any beliefs ortraditions, whether Latter day, cultural or scientific. It is simply a practical way to notice thoughts, physical sensations, sights, sounds, smells - anything we might not normally notice. The actual skillsmight be simple, but because it is so different to how our minds normally behave, it takes a lot of practice.   We might go out into the garden and as we look around, we might think  "\"That grassreally needs cutting, and that vegetable patch looks very untidy\". A young child on the other hand, will call over excitedly, \"Hey - come and look at this ant!\"   Mindfulness can simply be noticing whatwe don't normally notice, because our heads are too busy in the future or in the past - thinking about what we need to do, or going over what we have done.   Mindfulness might simply be described aschoosing and learning to control our focus of attention. Page 2 of 4 ToptalveraEmberco.uk/mindfulness.htm www.[employer]   Kim Pacheco 2009, permission to use for therapy purposes.      In a car, we can sometimes drive for miles on  automatic  , without really being aware of what we are doing. In the same way, we may not be really  present , [id number], for much of our lives: We canoften be  miles away  without knowing it.   On automatic , we are more likely to have our  buttons pressed : around us and thoughts, feelings and sensations (of which we may be only dimly aware) can trigger old habits of thinking that are often unhelpful and may lead to worsening mood.   By becoming moreaware of our thoughts, feelings, and body sensations, from moment to moment, we give ourselves the possibility of greater freedom and choice; we do not have to go into the same old  mental ruts  that may have caused problems in the past.      Mindful Activity   If we wash the dishes each evening, we might tend to be  in our heads? as we?re washing up, thinking about what we have to do, what we've done earlier in the day, worrying about future events, or regretful thoughts about the past. Again, ayoung child might see things differently, \"Listen to those bubbles! They're fun!\"   Washing up or another routine activity can become a routine (practice of) mindful activity for us. We might notice thetemperature of the water and how it feels on the skin, the texture of the bubbles on the skin, and yes, we might hear the bubbles " "as they softly pop. The sounds of the water as we take out and put dishesinto the water. The smoothness of the plates, and the texture of the sponge. Just noticing what we might not normally notice.   A mindful walk brings new pleasures. Walking is something most of us doat some time during the day. We can practice, even if only for a couple of minutes at a time, mindful walking. Rather than be \"in our heads\", we can look around and notice what we see, hear, sense. We mightnotice the sensations in our own body just through the act of walking. Noticing the sensations and movement of our feet, legs, arms, head and body as we take each step. Noticing our breathing. Thoughts willcontinuously intrude, but we can just notice them, and then bring our attention back to our walking.   The more we practice, perhaps the more (initially at least) we will notice those thoughtsintruding, and that's ok. The only aim of mindful activity is to bring our attention back to the activity continually, noticing those sensations, from outside and within us. Page 3 of 4 www.[employer]ozi Groupelfhelp.co.uk/mindfulness.htm www.PerSay.von Pacheco 2009, permission to use for therapy purposes.      Mindful Breathing   The primary focus in Mindfulness Meditation is the breathing. However, the primary goal is a calm, non-judging awareness, allowing thoughts and feelings to come and go withoutgetting caught up in them. This creates calmness and acceptance.   ? Sit comfortably, with your eyes closed and your spine reasonably straight.   ? Direct your attention to your breathing.   ? When thoughts, emotions, physical feelings or external sounds occur, accept them, giving them the space to come and go without judging or getting involved with them.   ? When you notice that your attention has drifted off and is becoming caught up in thoughts or feelings,simply note that the attention has drifted, and then gently bring the attention back to your breathing. "      It's ok and natural for thoughts to arise, and for your attention to follow them. No matterhow many times this happens, just keep bringing your attention back to your breathing.      Breathing Meditation 1 (Winston 1996)   Assume a comfortable posture lying on your back or sitting. If you are sitting, keep the spine straight and let your shoulders drop.   Close your eyes if itfeels comfortable.   Bring your attention to your belly, feeling it rise or expand gently on the in-breath and fall or recede on the out-breath.   Keep your focus on the breathing,  being with? each in-breath for its full duration and with each out-breath for its full duration, as if you were riding the waves of your own breathing.   Every timeyou notice that your mind has wandered off the breath, notice what it was that took you away and then gently bring your attention back to your belly and the feeling of the breath coming in and out.   If your mind wanders away from the breath a thousand times, then your job is simply to bring it back to the breath every time, no matter what it becomes preoccupied with.   Practice this exercisefor fifteen minutes at a convenient time every day, whether you feel like it or not, for one week and see how it feels to incorporate a disciplined meditation practice into your life. Be aware of how itfeels to spend some time each day just being with your breath without having to do anything.Page 4 of 4 www.getselfhelp.co.uk/mindfulness.htm www.nelson.von Pacheco 2009, permission to use fortherapy purposes.      Breathing Meditation 2 (Winston 1996)   ? Tune into your breathing at different times during the day, feeling the belly go through one or two risings and fallings.   ? Become aware of your thoughts andfeelings at these moments, just observing them without judging them or yourself.   ? At the same time, be aware of any changes in the way you are seeing things and feeling about yourself.  "     Using mindfulness to cope with negative experiences (thoughts, feelings, events)   As we become morepractised at using mindfulness for breathing, body sensations and routine daily activities, so we can then learn to be mindful of our thoughts and feelings, to become observers, and then more accepting ofthem. This results in less distressing feelings, and increases our ability to enjoy our lives.   With mindfulness, even the most disturbing sensations, feelings, thoughts, and experiences, can be viewedfrom a wider perspective as passing events in the mind, rather than as \"us\", or as being necessarily true. (Josiah 2003)   When we are more practiced in using mindfulness, we can use it even in of intense distress, by becoming mindful of the actual experience as an observer, using mindful breathing and focussing our attention on the breathing, listening to the distressing thoughts mindfully,recognising them as merely thoughts, breathing with them, allowing them to happen without believing them or arguing with them. If thoughts are too strong or loud, then we can move our attention to ourbreath, the body, or to sounds around us.   Maxim Montero uses the example of waves to help explain mindfulness.   Think of your mind as the surface of a lake or an ocean. There are always waveson the water, sometimes big, sometimes small, sometimes almost imperceptible. The water's waves are churned up by winds, which come and go and vary in direction and intensity, just as do the winds of stressand change in our lives, which stir up waves in our mind. It's possible to find shelter from much of the wind that agitates the mind. Whatever we might do to prevent them, the winds of life and of the mind blow.   \"You can't stop the waves, but you can learn to surf\" (Winston 2004     Crisis Lines  Crisis Text Line  Text 391825  You will be connected with a trained live crisis counselor to provide support.    National Hope Line  " "1.800.SUICIDE [8094085]      Community Resources  Fast Tracker  Linking people to mental health and substance use disorder resources  Unifiedn.org     [place] Mental Health Warm Line  Peer to peer support  Monday thru Saturday, 12 pm to 10 pm  166.221.6158 or 4.413.686.9893  Text \"Support\" to 38874    National Story on Mental Illness (MAGY)  049.326.6757 or 1.888.MAGY.HELPS        Mental Health Apps  My3  https://my3app.org/    VirtualHopeBox  https://eSellerPro.org/apps/virtual-hope-box/            "

## 2022-02-13 NOTE — DISCHARGE INSTRUCTIONS
"Aftercare Plan  If I am feeling unsafe or I am in a crisis, I will:   Contact my established care providers   Call the National Suicide Prevention Lifeline: 825.566.2315   Go to the nearest emergency room   Call 911      Warning signs that I or other people might notice when a crisis is developing for me: I want to call 911     Things I am able to do on my own to cope or help me feel better: listen to music, sleep, go for a walk, play basketball or football      Things that I am able to do with others to cope or help me better: talk to group home staff, talk to housemates     Things I can use or do for distraction: stuffed animals, listen to music, watch TV, be around other people      Changes I can make to support my mental health and wellness: keep appointments with mental health providers, use skills rather than call 911     People in my life that I can ask for help: staff, housemates, sister     Your [facility] has a mental health crisis team you can call 24/7: [facility] Mobile Crisis  [phone] (adults)  [phone] (children)     Other things that are important when I m in crisis: Do not call 911 unless it is an emergency     Additional resources and information: Using mindfulness to cope with negative experiences (thoughts, feelings, events)   As we become morepractised at using mindfulness for breathing, body sensations and routine daily activities, so we can then learn to be mindful of our thoughts and feelings, to become observers, and then more accepting ofthem. This results in less distressing feelings, and increases our ability to enjoy our lives.   With mindfulness, even the most disturbing sensations, feelings, thoughts, and experiences, can be viewedfrom a wider perspective as passing events in the mind, rather than as \"us\", or as being necessarily true. (Josiah 2003)   When we are more practiced in using mindfulness, we can use it even in of intense distress, by becoming mindful of the " "actual experience as an observer, using mindful breathing and focussing our attention on the breathing, listening to the distressing thoughts mindfully,recognising them as merely thoughts, breathing with them, allowing them to happen without believing them or arguing with them. If thoughts are too strong or loud, then we can move our attention to ourbreath, the body, or to sounds around us.   Maxim Montero uses the example of waves to help explain mindfulness.   Think of your mind as the surface of a lake or an ocean. There are always waveson the water, sometimes big, sometimes small, sometimes almost imperceptible. The water's waves are churned up by winds, which come and go and vary in direction and intensity, just as do the winds of stressand change in our lives, which stir up waves in our mind. It's possible to find shelter from much of the wind that agitates the mind. Whatever we might do to prevent them, the winds of life and of the mind blow.   \"You can't stop the waves, but you can learn to surf\" (Winston 2004      Crisis Lines  Crisis Text Line  Text 619375  You will be connected with a trained live crisis counselor to provide support.     Por espanol, texto  SIRENA a 843626 o texto a 442-AYUDAME en WhatsA     National Hope Line  1.800.SUICIDE [1916224]        Community Resources  Fast Tracker  Linking people to mental health and substance use disorder resources  fasttrackermn.org      [place] [facility] Warm Line  Peer to peer support  Monday thru Saturday, 12 pm to 10 pm  792.707.6655 or 8.776.404.8557  Text \"Support\" to 10605     National [place] on Mental Illness (MAGY)  096.884.2548 or 1.888.MAGY.HELPS           Mental Health Apps  My3  https://myMo-DVpp.org/     VirtualHopeBox  https://Veritext.org/apps/virtual-hope-box/  "

## 2022-02-13 NOTE — ED PROVIDER NOTES
"Received sign out from Dr. Sinha:    Spoke to Kathryn, DEC , Sadaf reports that she wants to \"rest here\" in the ED and then go back to her group home.  Kathryn has contacted her group home who accepts her back.  Will give her outpatient psychiatric follow up and discharge back to group home.    Natacha Beatty MD  "

## 2022-02-13 NOTE — TELEPHONE ENCOUNTER
"Patient is calling to report that she was recently in the emergency room due to self-harm and \"still not doing good\"    Patient wants to punch self, punch walls, reports that she is shaky, wants to hit her head against the night stand \"to damage my brain\".       Patient reports that she lives at a group home and \"not doing so hot\"    RN able to speak with staff member, Zoë, and she is with the patient and the patient is able to call 911.     According to the protocol, patient should call 911 now.  Care advice given. Patient verbalizes understanding and agrees with plan of care.     Flori Retana RN  02/13/22 1:36 PM  [facility] Nurse Advisor     Reason for Disposition    Hysterical or combative behavior    Additional Information    Negative: Severe difficulty breathing (e.g., struggling for each breath, speaks in single words)    Negative: Bluish (or gray) lips or face now    Negative: Difficult to awaken or acting confused (e.g., disoriented, slurred speech)    Protocols used: ANXIETY AND PANIC ATTACK-A-AH      "

## 2022-02-13 NOTE — ED NOTES
Writer tried to call group home to update staff about pt ready for discharge. Group home did not answer.

## 2022-02-13 NOTE — ED PROVIDER NOTES
[facility] EMERGENCY DEPARTMENT ([facility])       2/12/22  History     Chief Complaint   Patient presents with     Suicidal     The history is provided by the patient and medical records.     Sadaf Ross is a 21 year old female with a past medical history significant for borderline personality disorder, bipolar 1 disorder, MDD, ADHD, and intellectual disability who presents to the Emergency Department for evaluation of suicidal ideation. Patient reports suicidal ideation due to the upcoming anniversary of her father's death (2/17/21). Patient states she attempted suicide by cutting her wrists with a knife. She tried talking to her therapist with no resolve. She denies substance or alcohol use. Patient currently lives in a group home.       Past Medical History  Past Medical History:   Diagnosis Date     ADHD (attention deficit hyperactivity disorder)      Bipolar 1 disorder (H)      Borderline personality disorder (H)      Depression      Depressive disorder      Intellectual disability      Obesity      Syncope      Past Surgical History:   Procedure Laterality Date     APPENDECTOMY       APPENDECTOMY       ARIPiprazole ER (ABILIFY MAINTENA) 400 MG extended release inj syringe  benztropine (COGENTIN) 0.5 MG tablet  calcium carbonate (TUMS) 500 MG chewable tablet  chlorhexidine (CHLORHEXIDINE) 0.12 % solution  docusate sodium (COLACE) 100 MG capsule  hydrOXYzine (ATARAX) 50 MG tablet  metoclopramide (REGLAN) 10 MG tablet  naltrexone (DEPADE/REVIA) 50 MG tablet  norgestimate-ethinyl estradiol (SPRINTEC 28) 0.25-35 MG-MCG tablet  OLANZapine (ZYPREXA) 2.5 MG tablet  OLANZapine zydis (ZYPREXA) 5 MG ODT  omeprazole (PRILOSEC) 40 MG DR capsule  ondansetron (ZOFRAN) 4 MG tablet  polyethylene glycol (MIRALAX) 17 g packet  prochlorperazine (COMPAZINE) 10 MG tablet  promethazine (PHENERGAN) 25 MG suppository  venlafaxine (EFFEXOR-XR) 150 MG 24 hr capsule  Vitamin D, Cholecalciferol, 25 MCG (1000 UT)  TABS      Allergies   Allergen Reactions     Penicillins Rash and Unknown     Family History  Family History   Problem Relation Age of Onset     Diabetes Type 1 Father      Cancer Paternal Grandfather      Social History   Social History     Tobacco Use     Smoking status: Current Some Day Smoker     Packs/day: 1.00     Years: 5.00     Pack years: 5.00     Types: Cigarettes     Smokeless tobacco: Never Used   Substance Use Topics     Alcohol use: No     Drug use: No      Past medical history, past surgical history, medications, allergies, family history, and social history were reviewed with the patient. No additional pertinent items.     I have reviewed the Medications, Allergies, Past Medical and Surgical History, and Social History in the Epic system.    Review of Systems   Constitutional: Negative for fever.   Respiratory: Negative for shortness of breath.    Cardiovascular: Negative for chest pain.   Gastrointestinal: Positive for abdominal pain and vomiting.   Psychiatric/Behavioral: Positive for self-injury and suicidal ideas.   All other systems reviewed and are negative.    A complete review of systems was performed with pertinent positives and negatives noted in the HPI, and all other systems negative.    Physical Exam   BP: (!) 144/93  Pulse: 89  Temp: 98.1  F (36.7  C)  Resp: 12  SpO2: 98 %      Physical Exam  Constitutional:       General: She is not in acute distress.     Appearance: She is not diaphoretic.   HENT:      Head: Normocephalic.      Mouth/Throat:      Pharynx: No oropharyngeal exudate.   Eyes:      Extraocular Movements: Extraocular movements intact.   Cardiovascular:      Rate and Rhythm: Normal rate and regular rhythm.      Heart sounds: Normal heart sounds.   Pulmonary:      Effort: No respiratory distress.      Breath sounds: Normal breath sounds.   Abdominal:      General: There is no distension.      Palpations: Abdomen is soft.      Tenderness: There is no abdominal tenderness.    Musculoskeletal:         General: No deformity.      Cervical back: Neck supple.   Skin:     General: Skin is warm and dry.   Neurological:      Mental Status: She is alert.      Comments: alert   Psychiatric:         Behavior: Behavior normal.         ED Course   At 12:38 AM the patient was seen and examined by Richie Sinha DO in Room HW04.        Procedures              No results found for this or any previous visit (from the past 24 hour(s)).  Medications   ondansetron (ZOFRAN-ODT) ODT tab 4 mg (4 mg Oral Given 2/13/22 0209)             Assessments & Plan (with Medical Decision Making)   41-year-old female presents to us with a chief complaint of suicidal thoughts, possible self-harm, abdominal pain. Her abdominal exam is completely unremarkable. There is no evidence of current lacerations. Labs today are normal. We will have the patient assessed by the mental health evaluator. Patient care be signed out to the oncoming physician pending assessment.  I have reviewed the nursing notes.    I have reviewed the findings, diagnosis, plan and need for follow up with the patient.    Discharge Medication List as of 2/13/2022  8:14 AM          Final diagnoses:   Depressed mood   Suicidal ideation       IJami am serving as a trained medical scribe to document services personally performed by Richie Sinha DO, based on the provider's statements to me.      I, Richie Sinha DO, was physically present and have reviewed and verified the accuracy of this note documented by Jami Arango.     Richie Sinha DO  2/12/2022   [facility] EMERGENCY DEPARTMENT     Richie Sinha DO  02/21/22 0906

## 2022-02-13 NOTE — ED NOTES
Spoke with Arlene at the group home, states pt is able to discharge back to group home, there will be staff there. Address is [address] [place] [address].

## 2022-02-13 NOTE — ED NOTES
Pt states she is med compliant however does not know her myriad of medications.  Did not come with paperwork from group home.

## 2022-02-13 NOTE — ED NOTES
Bed: HW02  Expected date: 2/13/22  Expected time:   Means of arrival:   Comments:  N 735 22 y/o F   SI

## 2022-02-13 NOTE — DISCHARGE INSTRUCTIONS
"Aftercare Plan  If I am feeling unsafe or I am in a crisis, I will:   Contact my established care providers   Call the National Suicide Prevention Lifeline: 123.183.2810   Go to the nearest emergency room   Call 911      Warning signs that I or other people might notice when a crisis is developing for me: I want to call 911     Things I am able to do on my own to cope or help me feel better: listen to music: country, heavy metal, sleep, going for a walk, take a shower      Things that I am able to do with others to cope or help me better: talk to people, going for a walk      Things I can use or do for distraction: stuffed animals, coping skills, watch tv, engage with group home staff      Changes I can make to support my mental health and wellness: Keep appointments with mental health providers, use skills rather than call 911 and go to ED       People in my life that I can ask for help: grandma, uncle, sister      Your [facility] has a mental health crisis team you can call 24/7: [facility] Mobile Crisis  [phone] (adults)  [phone] (children)     Other things that are important when I m in crisis: Not to use 911 unless it is an emergency.       Additional resources and information:   What is Mindfulness?   Mindfulness is an ancient eastern practice which is very relevant for our lives today.Mindfulness is a very simple concept. Mindfulness means paying attention in a particular way: on purpose, in the present moment, and non-judgementally.   Mindfulness does not conflict with any beliefs ortraditions, whether Hinduism, cultural or scientific. It is simply a practical way to notice thoughts, physical sensations, sights, sounds, smells - anything we might not normally notice. The actual skillsmight be simple, but because it is so different to how our minds normally behave, it takes a lot of practice.   We might go out into the garden and as we look around, we might think \"That grassreally needs cutting, " "and that vegetable patch looks very untidy\". A young child on the other hand, will call over excitedly, \"Hey - come and look at this ant!\"   Mindfulness can simply be noticing whatwe don't normally notice, because our heads are too busy in the future or in the past - thinking about what we need to do, or going over what we have done.   Mindfulness might simply be described aschoosing and learning to control our focus of attention. Page 2 of 4 Person[employer] Technologieshelp.co.uk/mindfulness.htm www.[employer].[place]   Kim Pacheco 2009, permission to use for therapy purposes.      In a car, we can sometimes drive for miles on  automatic  , without really being aware of what we are doing. In the same way, we may not be really  present , aucnkh-vz-[id number], for much of our lives: We canoften be  miles away  without knowing it.   On automatic , we are more likely to have our  buttons pressed : around us and thoughts, feelings and sensations (of which we may be only dimly aware) can trigger old habits of thinking that are often unhelpful and may lead to worsening mood.   By becoming moreaware of our thoughts, feelings, and body sensations, from moment to moment, we give ourselves the possibility of greater freedom and choice; we do not have to go into the same old  mental ruts  that may have caused problems in the past.      Mindful Activity   If we wash the dishes each evening, we might tend to be  in our heads? as we?re washing up, thinking about what we have to do, what we've done earlier in the day, worrying about future events, or regretful thoughts about the past. Again, ayoung child might see things differently, \"Listen to those bubbles! They're fun!\"   Washing up or another routine activity can become a routine (practice of) mindful activity for us. We might notice thetemperature of the water and how it feels on the skin, the texture of the bubbles on the skin, and yes, we might hear the bubbles as they softly pop. The sounds of " "the water as we take out and put dishesinto the water. The smoothness of the plates, and the texture of the sponge. Just noticing what we might not normally notice.   A mindful walk brings new pleasures. Walking is something most of us doat some time during the day. We can practice, even if only for a couple of minutes at a time, mindful walking. Rather than be \"in our heads\", we can look around and notice what we see, hear, sense. We mightnotice the sensations in our own body just through the act of walking. Noticing the sensations and movement of our feet, legs, arms, head and body as we take each step. Noticing our breathing. Thoughts willcontinuously intrude, but we can just notice them, and then bring our attention back to our walking.   The more we practice, perhaps the more (initially at least) we will notice those thoughtsintruding, and that's ok. The only aim of mindful activity is to bring our attention back to the activity continually, noticing those sensations, from outside and within us. Page 3 of 4 www.[employer]Med[employer] Groupelfhelp.co.uk/mindfulness.htm www.[employer]   Kim Pacheco 2009, permission to use for therapy purposes.      Mindful Breathing   The primary focus in Mindfulness Meditation is the breathing. However, the primary goal is a calm, non-judging awareness, allowing thoughts and feelings to come and go withoutgetting caught up in them. This creates calmness and acceptance.   ? Sit comfortably, with your eyes closed and your spine reasonably straight.   ? Direct your attention to your breathing.   ? When thoughts, emotions, physical feelings or external sounds occur, accept them, giving them the space to come and go without judging or getting involved with them.   ? When you notice that your attention has drifted off and is becoming caught up in thoughts or feelings,simply note that the attention has drifted, and then gently bring the attention back to your breathing.      It's ok and natural for thoughts to " arise, and for your attention to follow them. No matterhow many times this happens, just keep bringing your attention back to your breathing.      Breathing Meditation 1 (Winston 1996)   Assume a comfortable posture lying on your back or sitting. If you are sitting, keep the spine straight and let your shoulders drop.   Close your eyes if itfeels comfortable.   Bring your attention to your belly, feeling it rise or expand gently on the in-breath and fall or recede on the out-breath.   Keep your focus on the breathing,  being with? each in-breath for its full duration and with each out-breath for its full duration, as if you were riding the waves of your own breathing.   Every timeyou notice that your mind has wandered off the breath, notice what it was that took you away and then gently bring your attention back to your belly and the feeling of the breath coming in and out.   If your mind wanders away from the breath a thousand times, then your job is simply to bring it back to the breath every time, no matter what it becomes preoccupied with.   Practice this exercisefor fifteen minutes at a convenient time every day, whether you feel like it or not, for one week and see how it feels to incorporate a disciplined meditation practice into your life. Be aware of how itfeels to spend some time each day just being with your breath without having to do anything.Page 4 of 4 www.getselfhelp.co.uk/mindfulness.htm www.get.von Pacheco 2009, permission to use fortherapy purposes.      Breathing Meditation 2 (Winston 1996)   ? Tune into your breathing at different times during the day, feeling the belly go through one or two risings and fallings.   ? Become aware of your thoughts andfeelings at these moments, just observing them without judging them or yourself.   ? At the same time, be aware of any changes in the way you are seeing things and feeling about yourself.      Using mindfulness to cope with negative  "experiences (thoughts, feelings, events)   As we become morepractised at using mindfulness for breathing, body sensations and routine daily activities, so we can then learn to be mindful of our thoughts and feelings, to become observers, and then more accepting ofthem. This results in less distressing feelings, and increases our ability to enjoy our lives.   With mindfulness, even the most disturbing sensations, feelings, thoughts, and experiences, can be viewedfrom a wider perspective as passing events in the mind, rather than as \"us\", or as being necessarily true. (Josiah 2003)   When we are more practiced in using mindfulness, we can use it even in of intense distress, by becoming mindful of the actual experience as an observer, using mindful breathing and focussing our attention on the breathing, listening to the distressing thoughts mindfully,recognising them as merely thoughts, breathing with them, allowing them to happen without believing them or arguing with them. If thoughts are too strong or loud, then we can move our attention to ourbreath, the body, or to sounds around us.   Maxim Montero uses the example of waves to help explain mindfulness.   Think of your mind as the surface of a lake or an ocean. There are always waveson the water, sometimes big, sometimes small, sometimes almost imperceptible. The water's waves are churned up by winds, which come and go and vary in direction and intensity, just as do the winds of stressand change in our lives, which stir up waves in our mind. It's possible to find shelter from much of the wind that agitates the mind. Whatever we might do to prevent them, the winds of life and of the mind blow.   \"You can't stop the waves, but you can learn to surf\" (Winston 2004      Crisis Lines  Crisis Text Line  Text 549694  You will be connected with a trained live crisis counselor to provide support.     National Hope Line  1.800.SUICIDE [6951918]        Community " "Resources  Fast Tracker  Linking people to mental health and substance use disorder resources  fasttrackermn.org      [place] Mental [facility] Warm Line  Peer to peer support  Monday thru Saturday, 12 pm to 10 pm  618.401.6240 or 5.246.850.0688  Text \"Support\" to 93989     National [place] on [place]ness (MAGY)  117.503.3221 or 1.888.MAGY.HELPS           Mental Health Apps  My3  https://myCrushBlvdpp.org/     VirtualHopeBox  https://[employer].org/apps/virtual-hope-box/     "

## 2022-02-13 NOTE — ED TRIAGE NOTES
"Pt called EMS on her own.  Pt struggling with upcoming anniversary of fathers death.  Pt was out shoppping and pt decided she was more sad once she got back to . Pt states she was suicidal with plan to cut self.  Pt states staff at  took knife home.  PT states she is med compliant. EMS states pt also complained of \"belly pain\" for days (emesis yesterday).   "

## 2022-02-13 NOTE — ED NOTES
"Pt is very sad and tearful and stating \"I can't keep myself safe\" \"I want to hurt myself\" this writer was talking with pt and trying to redirect pt and calm pt down. Pt attempted to hit the wall with her hand. Pt was stopped by staff and redirected. Pt was informed that if she starts hurting herself then her belongings are going to be taken away. Pt then took her air pod out of her ear and put it back into the  and then placed it and her stuffed animal on a chair next to her. This writer kept talking with the pt attempting to try and make pt keep herself safe. Pt hit the chair with her fist, Security called a [employer]. Pt continued to refuse to contract to keep self safe. Pt was offered oral Zyprexa, she refused and wanted IM instead. Pt was given IM Zyprexa and then walked to a bed. Pt got onto the bed. Pt declined a blanket and her stuffed animal. Seizure pad was placed on the side rail that is against the wall d/t pt attempting to lightly bang her head against the wall. Pt has a 1:1 with her so that she is not hurting herself.   "

## 2022-02-13 NOTE — ED TRIAGE NOTES
Per EMS SI with plan to hit head until it cracks open, and wants to cut herself for SIB not SI. Pt did not hurt herself today. Today is the 1 year since her dad passed away.

## 2022-02-14 ENCOUNTER — LAB REQUISITION (OUTPATIENT)
Dept: LAB | Facility: CLINIC | Age: 22
End: 2022-02-14
Payer: MEDICARE

## 2022-02-14 ENCOUNTER — HOSPITAL ENCOUNTER (EMERGENCY)
Facility: CLINIC | Age: 22
Discharge: HOME | End: 2022-02-14
Attending: PSYCHIATRY & NEUROLOGY | Admitting: PSYCHIATRY & NEUROLOGY
Payer: MEDICARE

## 2022-02-14 VITALS
RESPIRATION RATE: 16 BRPM | TEMPERATURE: 97.9 F | HEIGHT: 64 IN | SYSTOLIC BLOOD PRESSURE: 151 MMHG | OXYGEN SATURATION: 97 % | DIASTOLIC BLOOD PRESSURE: 99 MMHG | HEART RATE: 95 BPM | WEIGHT: 218 LBS | BODY MASS INDEX: 37.22 KG/M2

## 2022-02-14 DIAGNOSIS — R45.851 SUICIDAL IDEATIONS: ICD-10-CM

## 2022-02-14 DIAGNOSIS — F79 INTELLECTUAL DISABILITY: ICD-10-CM

## 2022-02-14 DIAGNOSIS — Z13.220 ENCOUNTER FOR SCREENING FOR LIPOID DISORDERS: ICD-10-CM

## 2022-02-14 DIAGNOSIS — F43.0 ACUTE REACTION TO STRESS: ICD-10-CM

## 2022-02-14 LAB
ANION GAP SERPL CALCULATED.3IONS-SCNC: 9 MMOL/L (ref 3–14)
BUN SERPL-MCNC: 16 MG/DL (ref 7–30)
CALCIUM SERPL-MCNC: 9.2 MG/DL (ref 8.5–10.1)
CHLORIDE BLD-SCNC: 108 MMOL/L (ref 94–109)
CHOLEST SERPL-MCNC: 189 MG/DL
CO2 SERPL-SCNC: 21 MMOL/L (ref 20–32)
CREAT SERPL-MCNC: 0.79 MG/DL (ref 0.52–1.04)
FASTING STATUS PATIENT QL REPORTED: ABNORMAL
GFR SERPL CREATININE-BSD FRML MDRD: >90 ML/MIN/1.73M2
GLUCOSE BLD-MCNC: 96 MG/DL (ref 70–99)
HDLC SERPL-MCNC: 47 MG/DL
LDLC SERPL CALC-MCNC: 73 MG/DL
NONHDLC SERPL-MCNC: 142 MG/DL
POTASSIUM BLD-SCNC: 4.6 MMOL/L (ref 3.4–5.3)
SODIUM SERPL-SCNC: 138 MMOL/L (ref 133–144)
TRIGL SERPL-MCNC: 347 MG/DL
TSH SERPL DL<=0.005 MIU/L-ACNC: 1.62 MU/L (ref 0.4–4)

## 2022-02-14 PROCEDURE — 90791 PSYCH DIAGNOSTIC EVALUATION: CPT

## 2022-02-14 PROCEDURE — 99284 EMERGENCY DEPT VISIT MOD MDM: CPT | Performed by: PSYCHIATRY & NEUROLOGY

## 2022-02-14 PROCEDURE — 80048 BASIC METABOLIC PNL TOTAL CA: CPT | Mod: ORL | Performed by: INTERNAL MEDICINE

## 2022-02-14 PROCEDURE — 84443 ASSAY THYROID STIM HORMONE: CPT | Mod: ORL | Performed by: INTERNAL MEDICINE

## 2022-02-14 PROCEDURE — 80061 LIPID PANEL: CPT | Mod: ORL | Performed by: INTERNAL MEDICINE

## 2022-02-14 PROCEDURE — 250N000013 HC RX MED GY IP 250 OP 250 PS 637: Performed by: PSYCHIATRY & NEUROLOGY

## 2022-02-14 PROCEDURE — 99285 EMERGENCY DEPT VISIT HI MDM: CPT | Mod: 25 | Performed by: PSYCHIATRY & NEUROLOGY

## 2022-02-14 RX ORDER — OLANZAPINE 10 MG/1
10 TABLET, ORALLY DISINTEGRATING ORAL ONCE
Status: COMPLETED | OUTPATIENT
Start: 2022-02-14 | End: 2022-02-14

## 2022-02-14 RX ADMIN — OLANZAPINE 10 MG: 10 TABLET, ORALLY DISINTEGRATING ORAL at 20:05

## 2022-02-14 ASSESSMENT — ENCOUNTER SYMPTOMS
NEUROLOGICAL NEGATIVE: 1
HALLUCINATIONS: 0
CARDIOVASCULAR NEGATIVE: 1
CONSTITUTIONAL NEGATIVE: 1
EYES NEGATIVE: 1
HYPERACTIVE: 0
RESPIRATORY NEGATIVE: 1
GASTROINTESTINAL NEGATIVE: 1
MUSCULOSKELETAL NEGATIVE: 1
NERVOUS/ANXIOUS: 1

## 2022-02-14 ASSESSMENT — MIFFLIN-ST. JEOR: SCORE: 1733.84

## 2022-02-15 NOTE — ED PROVIDER NOTES
ED Provider Note  [facility]      History     Chief Complaint   Patient presents with     Suicidal     EMS brought her in. Comes from group home. Happens about once a week. Thoughts of self harm from depression due to her dad passing away around this time of year. New medication added was Zyprexa (Per pt it helps). Patient denies cutting herself, no injuries noted by EMS.      HPI  Sadaf Ross is a 21 year old female who is here after calling 911, wanting to be brought to the ED so she can talk to staff about her distress and grief and loss reaction. Patient is well-known to us due to her frequent ED visits. This is her 5th visit over past 5 days. She was just seen yesterday. She has limited coping skills and resides in a group home. She does not feel [facility] staff is adequately supportive for her and she tends to seek visiting an ED to get her needs met. She now feels better and is ready to go home. She already has arranged for a ride back to her group home.    Please see DEC Crisis Assessment on 2/14/22 in Epic for further details.    PERSONAL MEDICAL HISTORY  Past Medical History:   Diagnosis Date     ADHD (attention deficit hyperactivity disorder)      Bipolar 1 disorder (H)      Borderline personality disorder (H)      Depression      Depressive disorder      Intellectual disability      Obesity      Syncope      PAST SURGICAL HISTORY  Past Surgical History:   Procedure Laterality Date     APPENDECTOMY       APPENDECTOMY       FAMILY HISTORY  Family History   Problem Relation Age of Onset     Diabetes Type 1 Father      Cancer Paternal Grandfather      SOCIAL HISTORY  Social History     Tobacco Use     Smoking status: Current Some Day Smoker     Packs/day: 1.00     Years: 5.00     Pack years: 5.00     Types: Cigarettes     Smokeless tobacco: Never Used   Substance Use Topics     Alcohol use: No     MEDICATIONS  No current facility-administered medications for this encounter.  "    Current Outpatient Medications   Medication     ARIPiprazole ER (ABILIFY MAINTENA) 400 MG extended release inj syringe     benztropine (COGENTIN) 0.5 MG tablet     calcium carbonate (TUMS) 500 MG chewable tablet     chlorhexidine (CHLORHEXIDINE) 0.12 % solution     docusate sodium (COLACE) 100 MG capsule     hydrOXYzine (ATARAX) 50 MG tablet     metoclopramide (REGLAN) 10 MG tablet     naltrexone (DEPADE/REVIA) 50 MG tablet     norgestimate-ethinyl estradiol (SPRINTEC 28) 0.25-35 MG-MCG tablet     OLANZapine (ZYPREXA) 2.5 MG tablet     OLANZapine zydis (ZYPREXA) 5 MG ODT     omeprazole (PRILOSEC) 40 MG DR capsule     ondansetron (ZOFRAN) 4 MG tablet     polyethylene glycol (MIRALAX) 17 g packet     prochlorperazine (COMPAZINE) 10 MG tablet     promethazine (PHENERGAN) 25 MG suppository     venlafaxine (EFFEXOR-XR) 150 MG 24 hr capsule     Vitamin D, Cholecalciferol, 25 MCG (1000 UT) TABS     ALLERGIES  Allergies   Allergen Reactions     Penicillins Rash and Unknown     Review of Systems   Constitutional: Negative.    HENT: Negative.    Eyes: Negative.    Respiratory: Negative.    Cardiovascular: Negative.    Gastrointestinal: Negative.    Genitourinary: Negative.    Musculoskeletal: Negative.    Skin: Negative.    Neurological: Negative.    Psychiatric/Behavioral: Positive for suicidal ideas. Negative for hallucinations. The patient is nervous/anxious. The patient is not hyperactive.    All other systems reviewed and are negative.        Physical Exam   BP: (!) 145/87  Pulse: 99  Temp: 97  F (36.1  C)  Resp: 16  Height: 162.6 cm (5' 4\")  Weight: 98.9 kg (218 lb)  Physical Exam  Vitals and nursing note reviewed.   HENT:      Head: Normocephalic.   Eyes:      Pupils: Pupils are equal, round, and reactive to light.   Pulmonary:      Effort: Pulmonary effort is normal.   Musculoskeletal:         General: Normal range of motion.      Cervical back: Normal range of motion.   Neurological:      General: No focal " deficit present.      Mental Status: She is alert.   Psychiatric:         Attention and Perception: Attention and perception normal. She does not perceive auditory or visual hallucinations.         Mood and Affect: Mood and affect normal.         Speech: Speech normal.         Behavior: Behavior normal. Behavior is not agitated, aggressive, hyperactive or combative. Behavior is cooperative.         Thought Content: Thought content normal. Thought content is not paranoid or delusional. Thought content does not include homicidal or suicidal ideation.         Cognition and Memory: Cognition and memory normal.         Judgment: Judgment normal.         ED Course      Procedures         Mental Health Risk Assessment      PSS-3    Date and Time Over the past 2 weeks have you felt down, depressed, or hopeless? Over the past 2 weeks have you had thoughts of killing yourself? Have you ever attempted to kill yourself? When did this last happen? User   02/14/22 1935 yes yes yes more than 6 months ago AI      C-SSRS ([place])    Date and Time Q1 Wished to be Dead (Past Month) Q2 Suicidal Thoughts (Past Month) Q3 Suicidal Thought Method Q4 Suicidal Intent without Specific Plan Q5 Suicide Intent with Specific Plan Q6 Suicide Behavior (Lifetime) Within the Past 3 Months? RETIRED: Level of Risk per Screen Screening Not Complete User   02/14/22 1935 yes yes yes yes yes yes -- -- -- AI              Suicide assessment completed by mental health (D.E.C., LCSW, etc.)       Results for orders placed or performed in visit on 02/14/22   Basic metabolic panel     Status: Normal   Result Value Ref Range    Sodium 138 133 - 144 mmol/L    Potassium 4.6 3.4 - 5.3 mmol/L    Chloride 108 94 - 109 mmol/L    Carbon Dioxide (CO2) 21 20 - 32 mmol/L    Anion Gap 9 3 - 14 mmol/L    Urea Nitrogen 16 7 - 30 mg/dL    Creatinine 0.79 0.52 - 1.04 mg/dL    Calcium 9.2 8.5 - 10.1 mg/dL    Glucose 96 70 - 99 mg/dL    GFR Estimate >90 >60 mL/min/1.73m2   Lipid  panel reflex to direct LDL     Status: Abnormal   Result Value Ref Range    Cholesterol 189 <200 mg/dL    Triglycerides 347 (H) <150 mg/dL    Direct Measure HDL 47 (L) >=50 mg/dL    LDL Cholesterol Calculated 73 <=100 mg/dL    Non HDL Cholesterol 142 (H) <130 mg/dL    Patient Fasting > 8hrs? Unknown     Narrative    Cholesterol  Desirable:  <200 mg/dL    Triglycerides  Normal:  Less than 150 mg/dL  Borderline High:  150-199 mg/dL  High:  200-499 mg/dL  Very High:  Greater than or equal to 500 mg/dL    Direct Measure HDL  Female:  Greater than or equal to 50 mg/dL   Male:  Greater than or equal to 40 mg/dL    LDL Cholesterol  Desirable:  <100mg/dL  Above Desirable:  100-129 mg/dL   Borderline High:  130-159 mg/dL   High:  160-189 mg/dL   Very High:  >= 190 mg/dL    Non HDL Cholesterol  Desirable:  130 mg/dL  Above Desirable:  130-159 mg/dL  Borderline High:  160-189 mg/dL  High:  190-219 mg/dL  Very High:  Greater than or equal to 220 mg/dL   TSH with free T4 reflex     Status: Normal   Result Value Ref Range    TSH 1.62 0.40 - 4.00 mU/L     Medications   OLANZapine zydis (zyPREXA) ODT tab 10 mg (10 mg Oral Given 2/14/22 2005)        Assessments & Plan (with Medical Decision Making)   Patient with history of intellectual disability who has very poor coping to stress and seeks support through the ED to get her needs met. She felt better after speaking with the  and now is ready to return to her Group Home. Patient appears at baseline. She can be discharged. She has arranged a ride to return to her group home. She is encouraged to follow-up established care and services.    I have reviewed the nursing notes. I have reviewed the findings, diagnosis, plan and need for follow up with the patient.    New Prescriptions    No medications on file       Final diagnoses:   Acute reaction to stress   Intellectual disability       --  Magno Sena MD  [facility] EMERGENCY DEPARTMENT  2/14/2022      Magno Sena MD  02/14/22 2043

## 2022-02-15 NOTE — ED NOTES
Bed: HW10UR  Expected date: 2/14/22  Expected time: 7:08 PM  Means of arrival:   Comments:  N 714: 20 y/o, F, SI

## 2022-02-15 NOTE — ED NOTES
2/14/2022  Sadaf Ross 2000     [facility] Crisis Assessment    Patient was assessed: in person  Patient location: [facility]    Referral Data and Chief Complaint  Sadaf is a 21 year old who uses female pronouns. Patient presented to the ED via EMS and was referred to the ED by self. Patient is presenting to the ED for the following concerns: sad thoughts about the loss of her father, ems reports this as si.      Informed Consent and Assessment Methods    Patient is her own guardian. Writer met with patient and explained the crisis assessment process, including applicable information disclosures and limits to confidentiality, assessed understanding of the process, and obtained consent to proceed with the assessment. Patient was observed to be able to participate in the assessment as evidenced by orientation, willingness to particpate in interview process. Assessment methods included conducting a formal interview with patient, review of medical records, collaboration with medical staff, and obtaining relevant collateral information from family and community providers when available.    Summary of Presenting Problem; Current Functioning     Pt was said to have gone shopping with her sister today and had a good day.  When she returned to the  she spoke of going to the ED.  Pt has been to the ED daily for the past 6 days, reporting to have si.Today pt reports that she came to the ED because she was thinking about the anniversary of her fathers death and that made her sad.  She admits that she just wanted to talk to someone.  She says her  staff don't 'understand her issues', however admits that she can talk to Arlene, who is currently working.  It appears that pt has a pattern of seeking attention from medical settings before using her coping skills.  Pt's , Arlene, reported yesterday that pt does not have access to anything harmful. Patient has an extensive care team in place with outpatient  therapy in an [facility] worker, medication management through [facility], a PCP through [facility] (no specific provider noted), and a  Jyoti at [phone]. Pt reported that she enjoys seeing her therapist and finds therapy to be helpful, she also said she thinks her meds a sort of helpful.     History of the Crisis  Pt lives in a group home with staff supports.  She has regular contact with family and has  providers for support.  She can identify country music as a coping skill as well as deep breathing.  Today she says she called [facility] prior to coming here.  She was able to pull up crisis phone numbers on her phone and show them to this writer.  She admits that her sister didn't want to talk about her dad, and she doesn't feel the  staff understand her issues, so she didn't really try to talk to  staff tonight before coming here.      Collateral Information  Group Home [facility] [phone] reports that pt had a good day, went shopping with her sister and had fun.  Apparently she told  staff prior to shopping that she was going to go to the ED later.  Pauline notes that pt was in no distress and seems to treat going to the ED as her daily 'tour', to get attention.      Risk Assessment    Risk of Harm to Self     ESS-6  1.a. Over the past 2 weeks, have you had thoughts of killing yourself? Yes  1.b. Have you ever attempted to kill yourself and, if yes, when did this last happen? No   2. Recent or current suicide plan? No   3. Recent or current intent to act on ideation? No  4. Lifetime psychiatric hospitalization? Yes  5. Pattern of excessive substance use? No  6. Current irritability, agitation, or aggression? No  Scoring note: BOTH 1a and 1b must be yes for it to score 1 point, if both are not yes it is zero. All others are 1 point per number. If all questions 1a/1b - 6 are no, risk is negligible. If one of 1a/1b is yes, then risk is mild. If either question 2 or 3, but not both, is yes, then  risk is automatically moderate regardless of total score. If both 2 and 3 are yes, risk is automatically high regardless of total score.     Score: 1, mild risk    The patient has the following risk factors for suicide: depressive symptoms and family disruption    Is the patient experiencing current suicidal ideation: No    Is the patient engaging in preparatory suicide behaviors (formulating how to act on plan, giving away possessions, saying goodbye, displaying dramatic behavior changes, etc)? No    Does the patient have access to firearms or other lethal means? no    The patient has the following protective factors: voluntarily seeking mental health support, established relationship community mental health provider(s), expresses desire to engage in treatment and safe/stable housing    Support system information:  staff, therapist, family    Patient strengths: pt can articulate coping skills, aware of resources    Does the patient engage in non-suicidal self-injurious behavior (NSSI/SIB)? Hx of cutting,  None today    Is the patient vulnerable to sexual exploitation?  Yes seems open to suggestion with questionable judgment    Is the patient experiencing abuse or neglect? no    Is the patient a vulnerable adult? Yes, lives in Group Home      Risk of Harm to Others  The patient has the following risk factors of harm to others: no risk factors identified    Does the patient have thoughts of harming others? No    Is the patient engaging in sexually inappropriate behavior?  no       Current Substance Abuse    Is there recent substance abuse? no    Was a urine drug screen or blood alcohol level obtained: No      Current Symptoms/Concerns    Symptoms  Attention, hyperactivity, and impulsivity symptoms present: No    Anxiety symptoms present: Yes: Generalized Symptoms: Avoidance and Physiological anxiety - sweating, flushing, shaking, shortness of breath, or racing heart      Appetite symptoms present: No     Behavioral  difficulties present: No     Cognitive impairment symptoms present: Yes: Decision-Making and Judgment/Insight    Depressive symptoms present: Yes Depressed mood, Impaired decision making  and Other bereavement     Eating disorder symptoms present: No    Learning disabilities, cognitive challenges, and/or developmental disorder symptoms present: Yes: Communication, Functional Impairments and Self-Regulation     Manic/hypomanic symptoms present: No    Personality and interpersonal functioning difficulties present : Yes: Cognitive Distortions, Emotional Deregulation and Impaired Interpersonal Functioning    Psychosis symptoms present: No      Sleep difficulties present: No    Substance abuse disorder symptoms present: No     Trauma and stressor related symptoms present: No           Mental Status Exam   Affect: Blunted   Appearance: Appropriate, hairstyle is buzz cut    Attention Span/Concentration: Attentive?    Eye Contact: Engaged   Fund of Knowledge: Appropriate and Delayed    Language /Speech Content: Fluent   Language /Speech Volume: Normal    Language /Speech Rate/Productions: Normal    Recent Memory: Intact   Remote Memory: Intact and Variable   Mood: Anxious, Normal and Sad  Baseline  Orientation to Person: Yes    Orientation to Place: Yes   Orientation to Time of Day: Yes    Orientation to Date: Yes    Situation (Do they understand why they are here?): Yes    Psychomotor Behavior: Normal and Other: some leg shakes at times    Thought Content: Clear   Thought Form: Goal Directed       Mental Health and Substance Abuse History     History  Current and historical diagnoses or mental health concerns:  ADHD, Bi Polar Disorder, Borderline Personality Disorder, MDD, Intellectual Disability     Prior MH services (inpatient, programmatic care, outpatient, etc) : Yes Yes Extensive  Patient has an extensive care team with outpatient therapy in an [facility], and medication management through [facility], a  PCP through [facility] (no specific provider noted), and a  Jyoti at [phone]. Pt currently lives at a group home ([phone] direct line to detention)     Has the patient used [facility] crisis team services before?: No     History of substance abuse: No     Prior LIZZETTE services (inpatient, programmatic care, detox, outpatient, etc) : No     History of commitment: No     Family history of MH/LIZZETTE: Yes pt unable to provide details     Trauma history: Yes pt unable to provide details      Medication  Psychotropic medications: Abilify, Cogentin, Hydroxyzine, Naltrexone, zyprexa, effexor    Current Care Team  Primary Care Provider: Yes. Name: [facility]. Location: [place]. Date of last visit: unknown. Frequency: unknown. Perceived helpfulness: yes.     Psychiatrist: Yes. Name: Emma Jacobson. Location: [facility]. Date of last visit: unknown. Frequency: unknown. Perceived helpfulness: yes.     Therapist: Yes. Name: Shannen Martin. Location: [facility] ([phone]). Date of last visit: last week. Frequency: weekly. Perceived helpfulness: helpful.     : Yes. Name: Jyoti Malik. Location: [facility] ([phone]). Date of last visit: unknown. Frequency: unknown. Perceived helpfulness: unknown.     [facility]    Release of Information  Was a release of information signed: No. Reason: forgot to request, pt has been seen 5 days in a row however      Biopsychosocial Information    Socioeconomic Information  Current living situation: Lives in     Employment/income source: ssdi     Relevant legal issues: na    Cultural, Faith, or spiritual influences on mental health care: na    Is the patient active in the  or a : No      Relevant Medical Concerns   Patient identifies concerns with completing ADLs? No     Patient can ambulate independently? Yes     Other medical concerns? No     History of  concussion or TBI? No        Diagnosis    301.83 (F60.3) Borderline Personality Disorder - by history     311 (F32.9) Unspecified Depressive Disorder  - by history           Therapeutic Intervention  The following therapeutic methodologies were employed when working with the patient: establishing rapport, active listening, assessing dimensions of crisis, solution focused brief therapy, identifying additional supports and alternative coping skills, establishing a discharge plan, brief supportive therapy and other: focus on bereavement and ways to honor loss. Patient response to intervention: she nodded head in agreement and reports that she can talk to Arlene at  if she didn't feel safe..      Disposition  Recommended disposition: Individual Therapy, Medication Management and Group Home: currently a resident      Reviewed case and recommendations with attending provider. Attending Name: Dr Magno Sena      Attending concurs with disposition: Yes      Patient concurs with disposition: Yes      Guardian concurs with disposition: NA     Final disposition: Group home: return home to  and f/u with existing providers and safety plans .       Clinical Substantiation of Recommendations   Rationale with supporting factors for disposition and diagnosis.     Pt admits to coming to the ED because she was sad about the anniversary of her father's death and wanted to talk to someone about it.  Although at times when she is sad she thinks about death, pt is not actively suicidal, homicidal and has not been sib/ cutting. She reports that her sister didn't want to talk about him, so she planned to come here for that reason.  Pt is not in distress, she is able to talk about her coping skills (music/breathing) and can find her crisis numbers saved on her phone. Pt got a txt from her sister and then seemed to want to get a ride home from her.  Pt states that she would talk to Arlene at the  if her mood changed or if she was  feeling unsafe.  Encouraged processing her grief in a constructive manner, allowing her self to feel the good and bad feelings, plan in advance for Feb 17th anniversary by intentionally acknowledging the date and its meaning.  Pt is not psychotic, is taking her meds and has had no noted impairments in functioning per , thus will discharge back to .         Assessment Details  Patient interview started at: 8:00pm and completed at: 8:20pm.    Total duration spent on the patient case in minutes: 1.75 hrs     CPT code(s) utilized: 36374 - Psychotherapy for Crisis - 60 (30-74*) min       Aftercare and Safety Planning  Follow up plans with MH/LIZZETTE services: Yes return to  and follow up with existing providers      Aftercare plan placed in the AVS and provided to patient: Yes. Given to patient by ED staff    Tori Baxter, [facility]      Aftercare Plan  If I am feeling unsafe or I am in a crisis, I will:   Contact my established care providers   Call the [place] Suicide Prevention Lifeline: 443.378.1305   Go to the nearest emergency room   Call 276     Warning signs that I or other people might notice when a crisis is developing for me:   When I feel the need of getting support from others, but don't ask Group Home staff for help    Things I am able to do on my own to cope or help me feel better: listen to country music    Things that I am able to do with others to cope or help me better: talk about my feelings     Things I can use or do for distraction: deep breathing, exercise    Changes I can make to support my mental health and wellness: Follow your therapy plans, use coping skills and talk to others before going to the ED    People in my life that I can ask for help: Therapist,  staff, Sister, Crisis hotlines    Your [facility] has a mental health crisis team you can call 24/7: [facility] Mobile Crisis  [phone] (adults)  [phone] (children)    Additional resources and information:  You mentioned  "that your therapist has on-call staff, so we recommend that you try to talk to someone there when you are feeling low.    Also, use the anniversary of your father's death to celebrate the good memories, like fishing and cooking and country music.  Try to get a photo and tell your Group Home about him on that day, maybe light a battery operated candle.  Allow yourself to be sad and process your grief, it means you cared.       Crisis Lines  Crisis Text Line  Text 129983  You will be connected with a trained live crisis counselor to provide support.    Por espanol, texto  SIRENA a 572660 o texto a 442-AYUDAME en WhatsApp    National Hope Line  1.800.SUICIDE [7682619]      Community Resources  Fast Tracker  Linking people to mental health and substance use disorder resources  fasttrackermn.org     [place] Mental Health Warm Line  Peer to peer support  Monday thru Saturday, 12 pm to 10 pm  863.535.2767 or 9.560.786.7996  Text \"Support\" to 12960    National [place] on Mental Illness (MAGY)  019.852.7933 or 1.888.MAGY.HELPS        Mental Health Apps  My3  https://myZhilabspp.org/    VirtualHopeBox  https://[employer].org/apps/virtual-hope-box/          "

## 2022-02-15 NOTE — DISCHARGE INSTRUCTIONS
Follow-up established care and services      Aftercare Plan  If I am feeling unsafe or I am in a crisis, I will:   Contact my established care providers   Call the National Suicide Prevention Lifeline: 421.539.6215   Go to the nearest emergency room   Call 911     Warning signs that I or other people might notice when a crisis is developing for me:   When I feel the need of getting support from others, but don't ask Group Home staff for help    Things I am able to do on my own to cope or help me feel better: listen to country music    Things that I am able to do with others to cope or help me better: talk about my feelings     Things I can use or do for distraction: deep breathing, exercise    Changes I can make to support my mental health and wellness: Follow your therapy plans, use coping skills and talk to others before going to the ED    People in my life that I can ask for help: Therapist,  staff, Sister, Crisis hotlines    Your [facility] has a mental health crisis team you can call 24/7: [facility] [place] Mobile Crisis  [phone] (adults)  [phone] (children)    Additional resources and information:  You mentioned that your therapist has on-call staff, so we recommend that you try to talk to someone there when you are feeling low.    Also, use the anniversary of your father's death to celebrate the good memories, like fishing and cooking and country music.  Try to get a photo and tell your Group Home about him on that day, maybe light a battery operated candle.  Allow yourself to be sad and process your grief, it means you cared.       Crisis Lines  Crisis Text Line  Text 963105  You will be connected with a trained live crisis counselor to provide support.    Por espanol, texto  SIRENA a 542079 o texto a 442-AYUDAME en WhatsApp    National Hope Line  1.800.SUICIDE [6185112]      Community Resources  Fast Tracker  Linking people to mental health and substance use disorder resources  [employer].org  "    [facility] Warm Line  Peer to peer support  Monday thru Saturday, 12 pm to 10 pm  250.928.7702 or 8.040.640.0804  Text \"Support\" to 52563    National [place] on Mental Illness (MAGY)  842.537.8789 or 1.888.MAGY.HELPS        Mental Health Apps  My3  [email or web address]://myCapsilon Corporationpp.org/    VirtualHopeBox  https://SigFig.org/apps/virtual-hope-box/  "

## 2022-02-15 NOTE — ED NOTES
Report given to Arlene, nurse at [facility]. Instructed to call cab to transport patient upon discharge. Sabine Salinas RN on 2/14/2022 at 8:37 PM

## 2022-02-15 NOTE — ED NOTES
[facility] address: [address] [place] [id number].     Patient states her sister, Sofia, will be coming to pick her up. Writer spoke with Arlene, nurse at [facility] regarding this arrangement. Safe to discharge patient with her sister per Arlene. Sabine Salinas RN on 2/14/2022 at 8:42 PM

## 2022-02-15 NOTE — ED TRIAGE NOTES
EMS brought her in. Comes from group home. Called 911 herself. Happens about once a week. Thoughts of self harm from depression due to her dad passing away around this time of year. New medication added was Zyprexa (Per pt it helps). Patient denies cutting herself, no injuries noted by EMS. Vitals stable-alittle tachy per EMS -110.    Name:   Address: [address] [place] [address], [place] [address]

## 2022-02-16 ENCOUNTER — HOSPITAL ENCOUNTER (EMERGENCY)
Facility: CLINIC | Age: 22
Discharge: HOME | End: 2022-02-16
Attending: EMERGENCY MEDICINE | Admitting: EMERGENCY MEDICINE
Payer: MEDICARE

## 2022-02-16 VITALS
TEMPERATURE: 99 F | RESPIRATION RATE: 16 BRPM | SYSTOLIC BLOOD PRESSURE: 146 MMHG | DIASTOLIC BLOOD PRESSURE: 82 MMHG | OXYGEN SATURATION: 99 % | HEART RATE: 101 BPM

## 2022-02-16 DIAGNOSIS — F43.0 ACUTE REACTION TO STRESS: ICD-10-CM

## 2022-02-16 DIAGNOSIS — F79 INTELLECTUAL DISABILITY: ICD-10-CM

## 2022-02-16 PROCEDURE — 99284 EMERGENCY DEPT VISIT MOD MDM: CPT | Performed by: EMERGENCY MEDICINE

## 2022-02-16 PROCEDURE — 99285 EMERGENCY DEPT VISIT HI MDM: CPT | Mod: 25 | Performed by: EMERGENCY MEDICINE

## 2022-02-16 PROCEDURE — 90791 PSYCH DIAGNOSTIC EVALUATION: CPT

## 2022-02-16 ASSESSMENT — ENCOUNTER SYMPTOMS
HALLUCINATIONS: 0
NERVOUS/ANXIOUS: 1
SLEEP DISTURBANCE: 1
DYSPHORIC MOOD: 1
HYPERACTIVE: 0

## 2022-02-16 NOTE — DISCHARGE INSTRUCTIONS
Return to your group home and continue working with your current providers.          Aftercare Plan  If I am feeling unsafe or I am in a crisis, I will:   Contact my established care providers   Call the National Suicide Prevention Lifeline: 494.958.4755   Go to the nearest emergency room   Call 913      Warning signs that I or other people might notice when a crisis is developing for me:   When I feel the need of getting support from others, but don't ask Group Home staff for help     Things I am able to do on my own to cope or help me feel better: listen to country music, hold onto ice cubes     Things that I am able to do with others to cope or help me better: talk about my feelings      Things I can use or do for distraction: deep breathing, exercise     Changes I can make to support my mental health and wellness: Follow your therapy plans, use coping skills and talk to others before going to the ED     People in my life that I can ask for help: Therapist,  staff, Sisters, Crisis hotlines     Your [facility] has a mental health crisis team you can call 24/7: [facility] Mobile Crisis  [phone] (adults)  [phone] (children)     Additional resources and information:  You mentioned that your therapist has on-call staff, so we recommend that you try to talk to someone there when you are feeling low.     Also, use the anniversary of your father's death to celebrate the good memories, like fishing and cooking and country music.  Try to get a photo and tell your Group Home about him tomorrow; consider getting a balloon, or special meal or treat. .  Allow yourself to be sad and process your grief, it means you cared.  This sadness is a normal part of grief.      Use the community visit from Paramedic ( care coordination) as a way to talk and calm down.  You are able to use your coping skills.         Crisis Lines  Crisis Text Line  Text 708504  You will be connected with a trained live crisis counselor to  "provide support.     Por espanol, texto  SIRENA a 283825 o texto a 442-AYUDAME en WhatsApp     National Hope Line  1.800.SUICIDE [8186491]        Community Resources  Fast Tracker  Linking people to mental health and substance use disorder resources  Finisarn.org      [place] Mental [facility] Warm Line  Peer to peer support  Monday thru Saturday, 12 pm to 10 pm  525.012.4901 or 9.275.529.2873  Text \"Support\" to 94798     National [place] on Mental Illness (MAGY)  067.470.3865 or 1.888.MAGY.HELPS           Mental Health Apps  My3  https://myExajoulepp.org/     VirtualHopeBox  https://Musement.org/apps/virtual-hope-box/  "

## 2022-02-16 NOTE — ED PROVIDER NOTES
ED Provider Note  [facility]      History     Chief Complaint   Patient presents with     Suicidal     Plan and intention to cut      Homicidal     To house mate with plan to punch them      HPI  Sadaf Ross is a 21 year old female with hx of intellectual disability and BPD who presents to the ED via ems.  She called 911 today because she is feeling stressed about the anniversary of her father's death.  Since 2/9/22 she has been seen daily in the ER except yesterday.  Tomorrow is the one year anniversary of her father's death.  She calls 911 herself wanting to come here.  She lives at a group home but says staff is too busy to talk to her.   She has a therapist she sees and they have an on call provider to chat with but she hasn't called them.   The patient is her own guardian.  Her mother has addiction issues.  She says she has thoughts to want to cut or die.  She felt a need to rest her mind so came here again today.  She says she is having sleep issues but staff say she is sleeping well.  She says no coping skills are helpful.  She has crisis numbers on her phone.        Past Medical History  Past Medical History:   Diagnosis Date     ADHD (attention deficit hyperactivity disorder)      Bipolar 1 disorder (H)      Borderline personality disorder (H)      Depression      Depressive disorder      Intellectual disability      Obesity      Syncope      Past Surgical History:   Procedure Laterality Date     APPENDECTOMY       APPENDECTOMY       docusate sodium (COLACE) 100 MG capsule  hydrOXYzine (ATARAX) 50 MG tablet  OLANZapine zydis (ZYPREXA) 5 MG ODT  venlafaxine (EFFEXOR-XR) 150 MG 24 hr capsule  Vitamin D, Cholecalciferol, 25 MCG (1000 UT) TABS  ARIPiprazole ER (ABILIFY MAINTENA) 400 MG extended release inj syringe  benztropine (COGENTIN) 0.5 MG tablet  calcium carbonate (TUMS) 500 MG chewable tablet  chlorhexidine (CHLORHEXIDINE) 0.12 % solution  metoclopramide (REGLAN) 10 MG  tablet  naltrexone (DEPADE/REVIA) 50 MG tablet  norgestimate-ethinyl estradiol (SPRINTEC 28) 0.25-35 MG-MCG tablet  OLANZapine (ZYPREXA) 2.5 MG tablet  omeprazole (PRILOSEC) 40 MG DR capsule  ondansetron (ZOFRAN) 4 MG tablet  polyethylene glycol (MIRALAX) 17 g packet  prochlorperazine (COMPAZINE) 10 MG tablet  promethazine (PHENERGAN) 25 MG suppository      Allergies   Allergen Reactions     Penicillins Rash and Unknown     Family History  Family History   Problem Relation Age of Onset     Diabetes Type 1 Father      Cancer Paternal Grandfather      Social History   Social History     Tobacco Use     Smoking status: Current Some Day Smoker     Packs/day: 1.00     Years: 5.00     Pack years: 5.00     Types: Cigarettes     Smokeless tobacco: Never Used   Substance Use Topics     Alcohol use: No     Drug use: No      Past medical history, past surgical history, medications, allergies, family history, and social history were reviewed with the patient. No additional pertinent items.       Review of Systems   Psychiatric/Behavioral: Positive for dysphoric mood, self-injury (thoughts of), sleep disturbance and suicidal ideas (thoughts of). Negative for hallucinations. The patient is nervous/anxious. The patient is not hyperactive.    All other systems reviewed and are negative.    A complete review of systems was performed with pertinent positives and negatives noted in the HPI, and all other systems negative.    Physical Exam   BP: (!) 146/82  Pulse: 101  Temp: 99  F (37.2  C)  Resp: 16  SpO2: 99 %  Physical Exam  Vitals and nursing note reviewed.   Constitutional:       Appearance: She is obese.   HENT:      Head: Normocephalic and atraumatic.   Eyes:      Extraocular Movements: Extraocular movements intact.   Cardiovascular:      Rate and Rhythm: Tachycardia present.   Pulmonary:      Effort: Pulmonary effort is normal.   Musculoskeletal:         General: No deformity or signs of injury. Normal range of motion.       Cervical back: Normal range of motion.   Skin:     General: Skin is warm and dry.   Neurological:      General: No focal deficit present.      Mental Status: She is alert and oriented to person, place, and time.   Psychiatric:         Attention and Perception: Attention and perception normal.         Mood and Affect: Affect normal. Mood is anxious and depressed.         Speech: Speech normal.         Behavior: Behavior normal. Behavior is cooperative.         Thought Content: Thought content normal.         Cognition and Memory: Memory normal. Cognition is impaired.         Judgment: Judgment is impulsive and inappropriate.         ED Course      Procedures       The medical record was reviewed and interpreted.  Mental Health Risk Assessment      PSS-3    Date and Time Over the past 2 weeks have you felt down, depressed, or hopeless? Over the past 2 weeks have you had thoughts of killing yourself? Have you ever attempted to kill yourself? When did this last happen? User   02/16/22 1537 yes yes yes within the last 24 hours (including today) APW      C-SSRS ([place])    Date and Time Q1 Wished to be Dead (Past Month) Q2 Suicidal Thoughts (Past Month) Q3 Suicidal Thought Method Q4 Suicidal Intent without Specific Plan Q5 Suicide Intent with Specific Plan Q6 Suicide Behavior (Lifetime) Within the Past 3 Months? RETIRED: Level of Risk per Screen Screening Not Complete User   02/16/22 1537 yes yes yes no yes yes -- -- -- APW              Suicide assessment completed by mental health (D.E.C., LCSW, etc.)       No results found for any visits on 02/16/22.  Medications - No data to display     Assessments & Plan (with Medical Decision Making)   Sadaf Ross is a 21 year old female with hx of intellectual disability and BPD who called 911 and asked to come here due to feeling overwhelmed about the anniversary of her father's death. She has been to the ER every day except yesterday since 2/9/22.  She comes, talks with  staff, feels better and returns to her group home.  Coming seems to be a diversion and she has poor coping to stress.  She was seen by myself and the DEC .  The DEC  discussed coping options with her.  The patient is feeling better again and is ready to return to her group home.      I have reviewed the nursing notes. I have reviewed the findings, diagnosis, plan and need for follow up with the patient.    New Prescriptions    No medications on file       Final diagnoses:   Acute reaction to stress   Intellectual disability       --  Ashely CANO [facility] EMERGENCY DEPARTMENT  2/16/2022     Ashely Toth MD  02/16/22 6230

## 2022-02-16 NOTE — ED NOTES
"2022  Sadaf Ross 2000     [facility] Crisis Assessment    Patient was assessed: in person  Patient location: [facility]     Referral Data and Chief Complaint  Sadaf is a 21 year old who uses female pronouns. Patient presented to the ED via EMS and was referred to the ED by self. Patient is presenting to the ED for the following concerns: urges to cut and didn't want to wait to talk to  Staff, so pt called 911 to 'rest my mind' and talk here in the ED..      Informed Consent and Assessment Methods    Patient is her own guardian. Writer met with patient and explained the crisis assessment process, including applicable information disclosures and limits to confidentiality, assessed understanding of the process, and obtained consent to proceed with the assessment. Patient was observed to be able to participate in the assessment as evidenced by orientation and desire to talk to someone.. Assessment methods included conducting a formal interview with patient, review of medical records, collaboration with medical staff, and obtaining relevant collateral information from family and community providers when available.    Narrative Summary of Presenting Problem and Current Functioning  Today pt reported that she was thinking about the anniversary of her father's death and wanted to cut using her old cell phone case.  Pt speaks about wanting to be with her  father, but does not have an active suicidal plan or intent.  Instead of talking to  staff or her therapist or therapist on-call service, pt choose to call 911.   Yesterday when pt called, EMS akua Aponte did a \"Pt home visit\" through care coordination and was able to calm pt to the point that she no longer needed or wanted to to go the ED.  Today she was just taken to the ED.    Pt admits to her si, yet is primarily focused on the anticipatory anxiety about the anniversary of her fathers death,  which is the 17th, tomorrow.  Provided psycho education on the " importance of acknowledging your losses, as well as bereavement supports.  Pt seems to return to baseline quickly after meeting with , reports plans to have her sister come and take her home.      History of the Crisis  Pt was said to have gone shopping with her sister two days ago and had a good day.  When she returned to the  she spoke of plans to go to the ED later that day. Pt has been to the ED nearly daily for the past week, reporting to have si because  she was thinking about the anniversary of her fathers death and that made her sad.  She admits that she just wanted to talk to someone.  She says her  staff don't 'understand her issues', however admits that she can talk to Arlene, who is currently working.  It appears that pt has a pattern of seeking attention from medical settings before using her coping skills.  Pt's , Arlene, reported yesterday that pt does not have access to anything harmful. Patient has an extensive care team in place with outpatient therapy in an [facility], medication management through [facility], a PCP through [facility] (no specific provider noted), and a  Jyoti at [phone]. Pt reported that she enjoys seeing her therapist and finds therapy to be helpful, she also said she thinks her meds a sort of helpful.   Pt lives in a group home with staff supports.  She has regular contact with family and has  providers for support.  She can identify country music as a coping skill as well as deep breathing.      Collateral Information  Arlene from [facility] [phone]  Left message today, spoke with Arlene two days ago for collateral at that time.    Risk Assessment    Risk of Harm to Self     ESS-6  1.a. Over the past 2 weeks, have you had thoughts of killing yourself? Yes  1.b. Have you ever attempted to kill yourself and, if yes, when did this last happen? No   2. Recent or current suicide plan? No   3.  Recent or current intent to act on ideation? No  4. Lifetime psychiatric hospitalization? Yes  5. Pattern of excessive substance use? No  6. Current irritability, agitation, or aggression? No  Scoring note: BOTH 1a and 1b must be yes for it to score 1 point, if both are not yes it is zero. All others are 1 point per number. If all questions 1a/1b - 6 are no, risk is negligible. If one of 1a/1b is yes, then risk is mild. If either question 2 or 3, but not both, is yes, then risk is automatically moderate regardless of total score. If both 2 and 3 are yes, risk is automatically high regardless of total score.     Score: 1, mild risk    The patient has the following risk factors for suicide: depressive symptoms, poor decision making, poor impulse control and recent loss/ anniversary    Is the patient experiencing current suicidal ideation: Yes. Passive wish to be dead without thoughts or plan.     Is the patient engaging in preparatory suicide behaviors (formulating how to act on plan, giving away possessions, saying goodbye, displaying dramatic behavior changes, etc)? No    Does the patient have access to firearms or other lethal means? no    T-he patient has the following protective factors: social support, voluntarily seeking mental health support, established relationship community mental health provider(s), expresses desire to engage in treatment and safe/stable housing    Support system information: sisters,  staff, therapist,     Patient strengths: resourceful    Does the patient engage in non-suicidal self-injurious behavior (NSSI/SIB)?hx of cutting, had urge today but didn't act on it    Is the patient vulnerable to sexual exploitation?  Yes due to intellectual disabilites    Is the patient experiencing abuse or neglect? no    Is the patient a vulnerable adult? Yes, pt resides in  and has intellectual disabilites, yet remains her own legal guardian      Risk of Harm to Others  The patient has  the following risk factors of harm to others: no risk factors identified    Does the patient have thoughts of harming others? No    Is the patient engaging in sexually inappropriate behavior?  no       Current Substance Abuse    Is there recent substance abuse? no    Was a urine drug screen or blood alcohol level obtained: No      Current Symptoms/Concerns    Symptoms  Attention, hyperactivity, and impulsivity symptoms present: No    Anxiety symptoms present: No      A-ppetite symptoms present: No     Behavioral difficulties present: Yes: Other: impulsive, impatient with feelings, calls 911 before talking to  staff     Cognitive impairment symptoms present: No    Depressive symptoms present: Yes Feelings of helplessness , Impaired decision making , Increased irritability/agitation, Low self esteem  and Thoughts of suicide/death      Eating disorder symptoms present: No    Learning disabilities, cognitive challenges, and/or developmental disorder symptoms present: Yes: Developmental Incidents and Other: intellectual disabilities, unknown IQ     Manic/hypomanic symptoms present: No    Personality and interpersonal functioning difficulties present : Yes: Cognitive Distortions and Emotional Deregulation    Psychosis symptoms present: No      Sleep difficulties present: Yes: Other: pt reports that she has difficulty falling asleep, but  staff notes no impairments    Substance abuse disorder symptoms present: No     Trauma and stressor related symptoms present: No           Mental Status Exam   Affect: Appropriate, Blunted and Other: somewhat dull at baseline   Appearance: Appropriate, wearing same sweatshirt with kittens on it as 2 days ago     Attention Span/Concentration: Attentive?    Eye Contact: Engaged   Fund of Knowledge: Appropriate and Delayed    Language /Speech Content: Fluent somewhat difficult to understand at times  Language /Speech Volume: Soft and Normal    Language /Speech Rate/Productions: Normal     Recent Memory: Intact   Remote Memory: Poor and Variable   Mood: Anxious and Sad    Orientation to Person: Yes    Orientation to Place: Yes   Orientation to Time of Day: Yes    Orientation to Date: Yes    Situation (Do they understand why they are here?): Yes    Psychomotor Behavior: Normal    Thought Content: Clear   Thought Form: Intact       Mental Health and Substance Abuse History    History  Current and historical diagnoses or mental health concerns:  ADHD, Bi Polar Disorder, Borderline Personality Disorder, MDD, Intellectual Disability     Prior MH services (inpatient, programmatic care, outpatient, etc) : Yes Yes Extensive  Patient has an extensive care team with outpatient therapy in an [facility], and medication management through [facility], a PCP through [facility] (no specific provider noted), and a  Jyoti at [phone]. Pt currently lives at a group home ([phone] direct line to [facility])     Has the patient used [facility] crisis team services before?: hx of contacting cope, did   not do that today     History of substance abuse: No     Prior LIZZETTE services (inpatient, programmatic care, detox, outpatient, etc) : No     History of commitment: No     Family history of MH/LIZZETTE: Yes pt unable to provide details     Trauma history: Yes pt unable to provide details    Medication  Psychotropic medications: Abilify, Cogentin, Hydroxyzine, Naltrexone, zyprexa, effexor     Current Care Team  Primary Care Provider: Yes. Name: [facility]. Location: [place]. Date of last visit: unknown. Frequency: unknown. Perceived helpfulness: yes.     Psychiatrist: Yes. Name: Emma Jacobson. Location: [facility]. Date of last visit: unknown. Frequency: unknown. Perceived helpfulness: yes.     Therapist: Yes. Name: Shannen Martin. Location: [facility] ([phone]). Date of last visit: last week. Frequency: weekly. Perceived  helpfulness: helpful.     : Yes. Name: Jyoti Malik. [facility] ([phone]). Date of last visit: unknown. Frequency: unknown. Perceived helpfulness: unknown.     CTSS or ARMHS: Treva and Viviana    Release of Information  Was a release of information signed: Yes. Providers included on the release: CM, Therapist, Med provider,  staff, Treva      Biopsychosocial Information    Socioeconomic Information  Current living situation: Lives in      Employment/income source: ssdi            Relevant legal issues: na     Cultural, Protestant, or spiritual influences on mental health care: na     Is the patient active in the  or a : No      Relevant Medical Concerns   Patient identifies concerns with completing ADLs? No     Patient can ambulate independently? Yes     Other medical concerns? No     History of concussion or TBI? No        Diagnosis    301.83 (F60.3) Borderline Personality Disorder - by history     311 (F32.9) Unspecified Depressive Disorder  - by history        (F70  ) unspecified Intellectual Disabilities - by hx       (F43.20) adjustment disorder nos vs bereavement - rule out       Therapeutic Intervention  The following therapeutic methodologies were employed when working with the patient: establishing rapport, active listening, assessing dimensions of crisis, solution focused brief therapy, identifying additional supports and alternative coping skills, establishing a discharge plan, brief supportive therapy and harm reduction. Patient response to intervention: pt reports that she can contract for safety and plans to call her sister for a ride back to the ..      Disposition  Recommended disposition: Group Home: return to current residence with current providers      Reviewed case and recommendations with attending provider. Attending Name: Dr Ashely Toth      Attending concurs with disposition: Yes      Patient concurs with disposition: Yes       Guardian concurs with disposition: NA     Final disposition: Group home: return to  and f/u with ind therapist and care providers .         Clinical Substantiation of Recommendations   Rationale with supporting factors for disposition and diagnosis.     Pt has been feeling more anxious, sad and irritable related to the first anniversary of her father's death, which is tomorrow.  She has gone to the ED for 7 out of 8 days in a row, where she talks to a LMHP, vents, calms and returns to baseline then is able to contract for safety and goes back to her .  Pt is not using the supports in her  and within her community providers, struggles to use her coping skills.    Recommendation is for pt to return back to  and f/u with existing providers.  staff should support her reaching out to her care team vs calling 911.  A behavior plan may need to be considered to give positive reinforcements for asking her  Staff for help.  Using the paramedic 'home visit'/ care coordination would be another option.  If pt continues this pattern after tomorrows anniversary, discussions about limited phone access or guardianship should take place with  and  staff.  Pt is at baseline, she is not actively suicidal, has no past attempts and has supervision and limited access in her Group Home setting.       Assessment Details  Patient interview started at: 5:00pm and completed at: 5:30pm.    Total duration spent on the patient case in minutes: 1.75 hrs     CPT code(s) utilized: 59350 - Psychotherapy for Crisis - 60 (30-74*) min       Aftercare and Safety Planning  Follow up plans with MH/LIZZETTE services: Yes f/u with ind therapist,  and [facility] team      Aftercare plan placed in the AVS and provided to patient: Yes. Given to patient by ED nurse    Tori Baxter [facility]      Aftercare Plan  If I am feeling unsafe or I am in a crisis, I will:   Contact my established care providers   Call the [place]  Suicide Prevention Lifeline: 463-570-2401   Go to the nearest emergency room   Call 911      Warning signs that I or other people might notice when a crisis is developing for me:   When I feel the need of getting support from others, but don't ask Group Home staff for help     Things I am able to do on my own to cope or help me feel better: listen to country music     Things that I am able to do with others to cope or help me better: talk about my feelings      Things I can use or do for distraction: deep breathing, exercise     Changes I can make to support my mental health and wellness: Follow your therapy plans, use coping skills and talk to others before going to the ED     People in my life that I can ask for help: Therapist,  staff, Sister, Crisis hotlines     Your [facility] has a mental health crisis team you can call 24/7: [facility] Mobile Crisis  [phone] (adults)  [phone] (children)     Additional resources and information:  You mentioned that your therapist has on-call staff, so we recommend that you try to talk to someone there when you are feeling low.     Also, use the anniversary of your father's death to celebrate the good memories, like fishing and cooking and country music.  Try to get a photo and tell your Group Home about him tomorrow; consider getting balloons or a special snack.  Allow yourself to be sad and process your grief, it means you cared and is a normal part of grieving.        Crisis Lines  Crisis Text Line  Text 241158  You will be connected with a trained live crisis counselor to provide support.     Por espanol, texto  SIRENA a 306954 o texto a 442-AYUDAME en WhatsApp     [place] Hope Line  1.800.SUICIDE [8481198]        Community Resources  Fast Tracker  Linking people to mental health and substance use disorder resources  fasttrackermn.org      [place] Mental [facility] Warm Line  Peer to peer support  Monday thru Saturday, 12 pm to 10 pm  578.103.5557 or  "4.079.585.5071  Text \"Support\" to 71759     National Normal on Mental Illness (MAGY)  645.662.2686 or 1.038.MAGY.HELPS           Mental Health Apps  My3  https://my3app.org/     VirtualHopeBox  https://Yieldbot.org/apps/virtual-hope-box/        "

## 2022-02-16 NOTE — ED TRIAGE NOTES
"Pt reports dad anniversary of death tomorrow and does not want to be at group home as pt will use \"old phone case to cut.\" Pt reports last attempt last night was trying to cut but didn't do it. Pt reports last SIB Feb 2021.  "

## 2022-02-17 ENCOUNTER — HOSPITAL ENCOUNTER (EMERGENCY)
Facility: CLINIC | Age: 22
Discharge: HOME | End: 2022-02-17
Attending: EMERGENCY MEDICINE | Admitting: EMERGENCY MEDICINE
Payer: MEDICARE

## 2022-02-17 VITALS
RESPIRATION RATE: 16 BRPM | TEMPERATURE: 98.5 F | SYSTOLIC BLOOD PRESSURE: 138 MMHG | OXYGEN SATURATION: 100 % | HEART RATE: 76 BPM | DIASTOLIC BLOOD PRESSURE: 87 MMHG

## 2022-02-17 DIAGNOSIS — R45.851 SUICIDAL IDEATION: ICD-10-CM

## 2022-02-17 PROCEDURE — 90791 PSYCH DIAGNOSTIC EVALUATION: CPT

## 2022-02-17 PROCEDURE — 99283 EMERGENCY DEPT VISIT LOW MDM: CPT | Performed by: EMERGENCY MEDICINE

## 2022-02-17 PROCEDURE — 99285 EMERGENCY DEPT VISIT HI MDM: CPT | Mod: 25 | Performed by: EMERGENCY MEDICINE

## 2022-02-17 NOTE — ED NOTES
"2/17/2022  Sadaf Ross 2000     [facility] Crisis Assessment    Patient was assessed: remote      Referral Data and Chief Complaint  Sadaf  is a 21 year old who uses she/her pronouns. Patient presented to the ED via EMS and was referred to the ED by self. Patient is presenting to the ED for the following concerns: frequent ED visits;  Emotional dysregulation/anniversary of father's death.      Informed Consent and Assessment Methods    Patient is her own guardian. Writer met with patient and explained the crisis assessment process, including applicable information disclosures and limits to confidentiality, assessed understanding of the process, and obtained consent to proceed with the assessment. Patient was observed to be able to participate in the assessment as evidenced by patient voluntarily met with  and actively participated in assessment and discharge plan. Assessment methods included conducting a formal interview with patient, review of medical records, collaboration with medical staff, and obtaining relevant collateral information from family and community providers when available.    Narrative Summary of Presenting Problem and Current Functioning  What led to the patient presenting for crisis services, factors that make the crisis life threatening or complex, stressors, how is this disrupting the patient's life, and how current functioning is in comparison to baseline. How is patient presenting during the assessment.     Per previous DEC assessment: \"Patient has an extensive history of ED visits, including 14 visits so far in 2022. Patient has previously reported that she enjoys ambulance rides.  Patient's , Arlene, reported that pt does not have access to anything harmful. Patient has an extensive care team in place with outpatient therapy in an [facility] worker, medication management through [facility], a PCP through [facility] (no specific provider noted), and a case " "manager Jyoti at [phone].\"    Since the above note was written a few days ago, the patient has had 3 more ED visits with the same presentation.  From yesterday's episode medical chart at intake from Dr. Toth:   \"She called 911 today because she is feeling stressed about the anniversary of her father's death.  Since 2/9/22 she has been seen daily in the ER except yesterday.  Tomorrow is the one year anniversary of her father's death.  She calls 911 herself wanting to come here.  She lives at a group home but says staff is too busy to talk to her.   She has a therapist she sees and they have an on call provider to chat with but she hasn't called them. The patient is her own guardian.  Her mother has addiction issues.  She says she has thoughts to want to cut or die.  She felt a need to rest her mind so came here again today.  She says she is having sleep issues but staff say she is sleeping well.  She says no coping skills are helpful.  She has crisis numbers on her phone.\"      Patient is again at ED having called 911 at around 3:30 am.     From yesterday's DEC Assessment completed by YASMINE Arias:  \"Pt's , Arlene, reported yesterday that pt does not have access to anything harmful. Patient has an extensive care team in place with outpatient therapy in an [facility] worker, medication management through [facility], a PCP through [facility] (no specific provider noted), and a  Jyoti at [phone]. Pt reported that she enjoys seeing her therapist and finds therapy to be helpful, she also said she thinks her meds a sort of helpful.   Pt lives in a group home with staff supports.  She has regular contact with family and has  providers for support.  She can identify country music as a coping skill as well as deep breathing.\"    History of the Crisis  Duration of the current crisis, coping skills attempted to reduce the crisis, community resources used, and past " "presentations.    The patient is living in a local group home.  Patient's contact is listed as Arlene from [facility] [phone].  Patient has come to the ED nearly everyday in February.  She enjoys riding in an ambulance and seems to enjoy chatting with staff and assessors.   She has been presenting with the same presenting each time.  Once she gets in front of a DEC  however, she seems to indicate that the group home is \"fine\" and that she is \"fine.\"   She has her safety plan memorized.  At today's visit, the patient began to recite her entire safety plan when  asked the patient what the patient enjoys as that is one of the diversionary skills when in crisis.  The patient proceeded to recite the entire safety plan as she has done this exercise enough to have memorized it.     The patient's father was a heavy drinker she says today and he  of related causes.  The patient says she has avoided substances.  She responds favorably to praise.  Patient notes she has a sister with whom she regularly interacts as well as 2 grandmothers and an uncle.   The patient seemed to enjoy taking with the .   This  therefore kept the conversation focused on the importance of using her safety plan and the disposition back to the [facility].      Collateral Information  Arlene from [facility] [phone]    Risk Assessment    Risk of Harm to Self     ESS-6  1.a. Over the past 2 weeks, have you had thoughts of killing yourself? Yes  1.b. Have you ever attempted to kill yourself and, if yes, when did this last happen? No   2. Recent or current suicide plan? No   3. Recent or current intent to act on ideation? No  4. Lifetime psychiatric hospitalization? No  5. Pattern of excessive substance use? No  6. Current irritability, agitation, or aggression? No  Scoring note: BOTH 1a and 1b must be yes for it to score 1 point, if both are not yes it " is zero. All others are 1 point per number. If all questions 1a/1b - 6 are no, risk is negligible. If one of 1a/1b is yes, then risk is mild. If either question 2 or 3, but not both, is yes, then risk is automatically moderate regardless of total score. If both 2 and 3 are yes, risk is automatically high regardless of total score.     Score: 1, mild risk    The patient has the following risk factors for suicide: poor decision making, poor impulse control and recent loss    Is the patient experiencing current suicidal ideation: No    Is the patient engaging in preparatory suicide behaviors (formulating how to act on plan, giving away possessions, saying goodbye, displaying dramatic behavior changes, etc)? No    Does the patient have access to firearms or other lethal means? no    The patient has the following protective factors: social support, voluntarily seeking mental health support, established relationship community mental health provider(s), expresses desire to engage in treatment and safe/stable housing    Support system information: group home staff, sister, therapist     Patient strengths: Patient is able to engage in conversation and services     Does the patient engage in non-suicidal self-injurious behavior (NSSI/SIB)? no    Is the patient vulnerable to sexual exploitation?  Unknown, potential appears to be there, however    Is the patient experiencing abuse or neglect? no    Is the patient a vulnerable adult? No      Risk of Harm to Others  The patient has the following risk factors of harm to others: no risk factors identified    Does the patient have thoughts of harming others? No    Is the patient engaging in sexually inappropriate behavior?  no       Current Substance Abuse    Is there recent substance abuse? no    Was a urine drug screen or blood alcohol level obtained: No      Current Symptoms/Concerns    Symptoms  Attention, hyperactivity, and impulsivity symptoms present: patient has ADHD  dx    Anxiety symptoms present: Yes: Generalized Symptoms: Cognitive anxiety - feelings of doom, racing thoughts, difficulty concentrating       Appetite symptoms present: No     Behavioral difficulties present: Yes: Impulsivity/Disinhibition     Cognitive impairment symptoms present: Yes: Judgment/Insight    Depressive symptoms present: sad about loss of father     Eating disorder symptoms present: No    Learning disabilities, cognitive challenges, and/or developmental disorder symptoms present: patient appears to have delays     Manic/hypomanic symptoms present: No    Personality and interpersonal functioning difficulties present : emotional dysregulation    Psychosis symptoms present: No      Sleep difficulties present: No    Substance abuse disorder symptoms present: No     Trauma and stressor related symptoms present:  No          Mental Status Exam   Affect: Appropriate   Appearance: Appropriate    Attention Span/Concentration: Attentive?    Eye Contact: Engaged   Fund of Knowledge: Appropriate    Language /Speech Content: Fluent   Language /Speech Volume: Normal    Language /Speech Rate/Productions: Normal    Recent Memory: Variable   Remote Memory: Variable   Mood: Normal and Sad    Orientation to Person: Yes    Orientation to Place: Yes   Orientation to Time of Day: Yes    Orientation to Date: No    Situation (Do they understand why they are here?): Yes    Psychomotor Behavior: Normal    Thought Content: Clear   Thought Form: Intact       Mental Health and Substance Abuse History    History  Current and historical diagnoses or mental health concerns: BPD, intellectual disability  Bereavement vs. MDD/adjustment     Prior MH services (inpatient, programmatic care, outpatient, etc) : Yes extensive;  in group home currently     Has the patient used [facility] crisis team services before?: Yes, patient has called [facility] per records    History of substance abuse: No    Prior LIZZETTE services (inpatient, programmatic  care, detox, outpatient, etc) : No    History of commitment: No    Family history of MH/LIZZETTE: yes but unspecified except for father's LIZZETTE    Trauma history: yes but details unknown    Medication  Psychotropic medications:  Yes.  See chart    Current Care Team    Primary Care Provider: Yes. Name: [facility]. Location: [place]. Date of last visit: unknown. Frequency: unknown. Perceived helpfulness: yes.     Psychiatrist: Yes. Name: Emma Jacobson. Location: [facility]. Date of last visit: unknown. Frequency: unknown. Perceived helpfulness: yes.     Therapist: Yes. Name: Shannen Martin. Location: [facility] ([phone]). Date of last visit: last week. Frequency: weekly. Perceived helpfulness: helpful.     : Yes. Name: Jyoti Malik. Location: [facility] ([phone]). Date of last visit: unknown. Frequency: unknown. Perceived helpfulness: unknown.     CTSS or ARMHS: [facility]    Release of Information  Was a release of information signed: on file       Biopsychosocial Information    Socioeconomic Information  Current living situation: In Group home    Employment/income source: Disability    Relevant legal issues: none known    Cultural, Confucianist, or spiritual influences on mental health care: no    Is the patient active in the  or a : No      Relevant Medical Concerns   Patient identifies concerns with completing ADLs? No     Patient can ambulate independently? Yes     Other medical concerns? No     History of concussion or TBI? No        Diagnosis  301.83 (F60.3) Borderline Personality Disorder - by history   311 (F32.9) Unspecified Depressive Disorder  - by history   (F70 ) unspecified Intellectual Disabilities - by hx  (F43.20) adjustment disorder nos vs bereavement/mdd - rule out      Therapeutic Intervention  The following therapeutic methodologies were employed when working with the patient: establishing  "rapport, active listening and assessing dimensions of crisis. Patient response to intervention: patient seems to enjoy assessments is very familiar with content and process.  Patient presents as engaged and enthusiastic about assessment.  Shows some sadness about father and talks about his LIZZETTE but does not present as actively suicidal or significantly depressed or anxious .      Disposition  Recommended disposition: Group Home: Return to current group home (see above)       Reviewed case and recommendations with attending provider. Attending Name: Sourav Nuñez MD      Attending concurs with disposition: Yes      Patient concurs with disposition: Yes      Guardian concurs with disposition: NA     Final disposition: Group home: Return to current group home  .       Clinical Substantiation of Recommendations   Rationale with supporting factors for disposition and diagnosis.     From yesterday's DEC Assessment completed by YASMINE Arias:  \"Pt's , Arlene, reported yesterday that pt does not have access to anything harmful. Patient has an extensive care team in place with outpatient therapy in an [facility], medication management through [facility], a PCP through [facility] (no specific provider noted), and a  Jyoti at [phone]. Pt reported that she enjoys seeing her therapist and finds therapy to be helpful, she also said she thinks her meds a sort of helpful.   Pt lives in a group home with staff supports.  She has regular contact with family and has  providers for support.  She can identify country music as a coping skill as well as deep breathing.\"       This writer talked with patient who seemed incongruent in speech content and demeanor.  She reported that everything is \"fine\" at the group home and that she is \"fine.\"  She denies SI, SIB or HI.   She likes ambulance rides she said and talking with assessors and staff.   She says she wants to go back to her " group home.     The patient has been at ED almost every day so far of February (this month).  It appears this is reinforcing to patient.   It is not in the patient's best interests to bypass her safety plan with nothing but positive reinforcement.  It is not a good use of resources.  The patient agreed to use her safety plan and was praised for doing so.  Hopefully the latter behavior can be reinforced such that patient will not continue to use the ED without being actively suicidal.     Current mental health concerns:  Borderline Personality Disorder, MDD/loss;  Intellectual Disability.         Assessment Details  Patient interview started at:6:32 am and completed at: 6:47.    Total duration spent on the patient case in minutes: .50 hrs     CPT code(s) utilized: 16488 - Psychotherapy for Crisis - 60 (30-74*) min       Aftercare and Safety Planning  Follow up plans with MH/LIZZETTE services: Yes Patient to return to Group home and established providers      Aftercare plan placed in the AVS and provided to patient: No. Rationale: patient received one yesterday-- no change to agreement    YOSHI Alvarado

## 2022-02-17 NOTE — DISCHARGE INSTRUCTIONS
Please take your medications as prescribed    Follow-up with your mental health providers as previously planned

## 2022-02-17 NOTE — ED NOTES
Patient's contact is listed as Arlene from [facility] [phone].  This  called Arlene who agreed to call the [facility] Nurse to arrange for transportation of patient back to [facility].

## 2022-02-17 NOTE — ED PROVIDER NOTES
"ED Provider Note  [facility]      History     Chief Complaint   Patient presents with     Suicidal     \" It is my dad's 1 year anniversary and I wanna die.\"  She called 911 today because she is feeling stressed about the anniversary of her father's death and wants to die.     HPI  Sadaf Ross is a 21 year old female who has a PMHx of BPD, SI presenting with SI with plan to cut self. No hallucinations or voices. No self injurious behavior or ingestion of meds today. No recent drugs/etoh. Patient denies and racing thoughts, trouble sleeping. States things are fine at her group home. Patient denies CP, SOB, fevers, abd pain, vomiting or other medical concerns.    Past Medical History  Past Medical History:   Diagnosis Date     ADHD (attention deficit hyperactivity disorder)      Bipolar 1 disorder (H)      Borderline personality disorder (H)      Depression      Depressive disorder      Intellectual disability      Obesity      Syncope      Past Surgical History:   Procedure Laterality Date     APPENDECTOMY       APPENDECTOMY       ARIPiprazole ER (ABILIFY MAINTENA) 400 MG extended release inj syringe  benztropine (COGENTIN) 0.5 MG tablet  calcium carbonate (TUMS) 500 MG chewable tablet  chlorhexidine (CHLORHEXIDINE) 0.12 % solution  docusate sodium (COLACE) 100 MG capsule  hydrOXYzine (ATARAX) 50 MG tablet  metoclopramide (REGLAN) 10 MG tablet  naltrexone (DEPADE/REVIA) 50 MG tablet  norgestimate-ethinyl estradiol (SPRINTEC 28) 0.25-35 MG-MCG tablet  OLANZapine (ZYPREXA) 2.5 MG tablet  OLANZapine zydis (ZYPREXA) 5 MG ODT  omeprazole (PRILOSEC) 40 MG DR capsule  ondansetron (ZOFRAN) 4 MG tablet  polyethylene glycol (MIRALAX) 17 g packet  prochlorperazine (COMPAZINE) 10 MG tablet  promethazine (PHENERGAN) 25 MG suppository  venlafaxine (EFFEXOR-XR) 150 MG 24 hr capsule  Vitamin D, Cholecalciferol, 25 MCG (1000 UT) TABS      Allergies   Allergen Reactions     Penicillins Rash and Unknown "     Family History  Family History   Problem Relation Age of Onset     Diabetes Type 1 Father      Cancer Paternal Grandfather      Social History   Social History     Tobacco Use     Smoking status: Current Some Day Smoker     Packs/day: 1.00     Years: 5.00     Pack years: 5.00     Types: Cigarettes     Smokeless tobacco: Never Used   Substance Use Topics     Alcohol use: No     Drug use: No      Past medical history, past surgical history, medications, allergies, family history, and social history were reviewed with the patient. No additional pertinent items.       Review of Systems  A complete review of systems was performed with pertinent positives and negatives noted in the HPI, and all other systems negative.    Physical Exam   BP: (!) 143/73  Pulse: 109  Temp: 98.5  F (36.9  C)  Resp: 20  SpO2: 97 %  Physical Exam  Physical Exam   Constitutional: oriented to person, place, and time. appears well-developed and well-nourished.   HENT:   Head: Normocephalic and atraumatic.   Neck: Normal range of motion.   Pulmonary/Chest: Effort normal. No respiratory distress.   Cardiac: No murmurs, rubs, gallops. RRR.  Abdominal: Abdomen soft, nontender, nondistended. No rebound tenderness.  MSK: Long bones without deformity or evidence of trauma  Neurological: alert and oriented to person, place, and time.   Skin: Skin is warm and dry.   Psychiatric:  normal mood and affect.  behavior is normal. Thought content normal.     ED Course      Procedures         Mental Health Risk Assessment      PSS-3    Date and Time Over the past 2 weeks have you felt down, depressed, or hopeless? Over the past 2 weeks have you had thoughts of killing yourself? Have you ever attempted to kill yourself? When did this last happen? User   02/17/22 0405 yes yes yes within the last 24 hours (including today) JN      C-SSRS ([place])    Date and Time Q1 Wished to be Dead (Past Month) Q2 Suicidal Thoughts (Past Month) Q3 Suicidal Thought Method Q4  Suicidal Intent without Specific Plan Q5 Suicide Intent with Specific Plan Q6 Suicide Behavior (Lifetime) Within the Past 3 Months? RETIRED: Level of Risk per Screen Screening Not Complete User   02/17/22 0405 yes -- -- -- -- -- -- -- -- JN              Suicide assessment completed by mental health (D.E.C., LCSW, etc.)       No results found for any visits on 02/17/22.  Medications - No data to display     Assessments & Plan (with Medical Decision Making)   MDM  Patient presenting with SI initially. She was seen by our  and is not suicidal any longer. She is eager to go back to group home. She is discharged in good condition.    I have reviewed the nursing notes. I have reviewed the findings, diagnosis, plan and need for follow up with the patient.    New Prescriptions    No medications on file       Final diagnoses:   Suicidal ideation       --  Sourav Nuñez  [facility] EMERGENCY DEPARTMENT  2/17/2022     Sourav Nuñez MD  02/17/22 0611

## 2022-02-17 NOTE — ED NOTES
Pt discharged back to group home via taxi. Pt is own guardian and is able to come and go as pt wants.

## 2022-02-23 ENCOUNTER — HOSPITAL ENCOUNTER (EMERGENCY)
Facility: CLINIC | Age: 22
Discharge: HOME | End: 2022-02-24
Attending: EMERGENCY MEDICINE | Admitting: EMERGENCY MEDICINE
Payer: MEDICARE

## 2022-02-23 ENCOUNTER — APPOINTMENT (OUTPATIENT)
Dept: ULTRASOUND IMAGING | Facility: CLINIC | Age: 22
End: 2022-02-23
Attending: EMERGENCY MEDICINE
Payer: MEDICARE

## 2022-02-23 DIAGNOSIS — F31.9 BIPOLAR DISORDER, UNSPECIFIED (H): ICD-10-CM

## 2022-02-23 DIAGNOSIS — R10.13 ABDOMINAL PAIN, EPIGASTRIC: ICD-10-CM

## 2022-02-23 DIAGNOSIS — F60.3 EXPLOSIVE PERSONALITY DISORDER (H): ICD-10-CM

## 2022-02-23 LAB
ALBUMIN SERPL-MCNC: 4.1 G/DL (ref 3.4–5)
ALP SERPL-CCNC: 50 U/L (ref 40–150)
ALT SERPL W P-5'-P-CCNC: 30 U/L (ref 0–50)
ANION GAP SERPL CALCULATED.3IONS-SCNC: 5 MMOL/L (ref 3–14)
AST SERPL W P-5'-P-CCNC: 12 U/L (ref 0–45)
BASOPHILS # BLD AUTO: 0 10E3/UL (ref 0–0.2)
BASOPHILS NFR BLD AUTO: 0 %
BILIRUB SERPL-MCNC: 0.3 MG/DL (ref 0.2–1.3)
BUN SERPL-MCNC: 14 MG/DL (ref 7–30)
CALCIUM SERPL-MCNC: 9.1 MG/DL (ref 8.5–10.1)
CHLORIDE BLD-SCNC: 110 MMOL/L (ref 94–109)
CO2 SERPL-SCNC: 25 MMOL/L (ref 20–32)
CREAT SERPL-MCNC: 0.8 MG/DL (ref 0.52–1.04)
EOSINOPHIL # BLD AUTO: 0.2 10E3/UL (ref 0–0.7)
EOSINOPHIL NFR BLD AUTO: 2 %
ERYTHROCYTE [DISTWIDTH] IN BLOOD BY AUTOMATED COUNT: 12.7 % (ref 10–15)
GFR SERPL CREATININE-BSD FRML MDRD: >90 ML/MIN/1.73M2
GLUCOSE BLD-MCNC: 119 MG/DL (ref 70–99)
HCT VFR BLD AUTO: 37.2 % (ref 35–47)
HGB BLD-MCNC: 12.7 G/DL (ref 11.7–15.7)
HOLD SPECIMEN: NORMAL
IMM GRANULOCYTES # BLD: 0 10E3/UL
IMM GRANULOCYTES NFR BLD: 0 %
LIPASE SERPL-CCNC: 135 U/L (ref 73–393)
LYMPHOCYTES # BLD AUTO: 2.7 10E3/UL (ref 0.8–5.3)
LYMPHOCYTES NFR BLD AUTO: 29 %
MCH RBC QN AUTO: 28.6 PG (ref 26.5–33)
MCHC RBC AUTO-ENTMCNC: 34.1 G/DL (ref 31.5–36.5)
MCV RBC AUTO: 84 FL (ref 78–100)
MONOCYTES # BLD AUTO: 0.3 10E3/UL (ref 0–1.3)
MONOCYTES NFR BLD AUTO: 4 %
NEUTROPHILS # BLD AUTO: 6 10E3/UL (ref 1.6–8.3)
NEUTROPHILS NFR BLD AUTO: 65 %
NRBC # BLD AUTO: 0 10E3/UL
NRBC BLD AUTO-RTO: 0 /100
PLATELET # BLD AUTO: 248 10E3/UL (ref 150–450)
POTASSIUM BLD-SCNC: 3.7 MMOL/L (ref 3.4–5.3)
PROT SERPL-MCNC: 7.8 G/DL (ref 6.8–8.8)
RBC # BLD AUTO: 4.44 10E6/UL (ref 3.8–5.2)
SODIUM SERPL-SCNC: 140 MMOL/L (ref 133–144)
WBC # BLD AUTO: 9.2 10E3/UL (ref 4–11)

## 2022-02-23 PROCEDURE — 99285 EMERGENCY DEPT VISIT HI MDM: CPT | Mod: 25

## 2022-02-23 PROCEDURE — 80053 COMPREHEN METABOLIC PANEL: CPT | Performed by: EMERGENCY MEDICINE

## 2022-02-23 PROCEDURE — 250N000011 HC RX IP 250 OP 636: Performed by: EMERGENCY MEDICINE

## 2022-02-23 PROCEDURE — 85004 AUTOMATED DIFF WBC COUNT: CPT | Performed by: EMERGENCY MEDICINE

## 2022-02-23 PROCEDURE — 76705 ECHO EXAM OF ABDOMEN: CPT

## 2022-02-23 PROCEDURE — 250N000009 HC RX 250: Performed by: EMERGENCY MEDICINE

## 2022-02-23 PROCEDURE — 250N000013 HC RX MED GY IP 250 OP 250 PS 637: Performed by: EMERGENCY MEDICINE

## 2022-02-23 PROCEDURE — 36415 COLL VENOUS BLD VENIPUNCTURE: CPT | Performed by: EMERGENCY MEDICINE

## 2022-02-23 PROCEDURE — 99284 EMERGENCY DEPT VISIT MOD MDM: CPT | Performed by: EMERGENCY MEDICINE

## 2022-02-23 PROCEDURE — 83690 ASSAY OF LIPASE: CPT | Performed by: EMERGENCY MEDICINE

## 2022-02-23 RX ORDER — ONDANSETRON 4 MG/1
4 TABLET, ORALLY DISINTEGRATING ORAL ONCE
Status: COMPLETED | OUTPATIENT
Start: 2022-02-23 | End: 2022-02-23

## 2022-02-23 RX ADMIN — LIDOCAINE HYDROCHLORIDE 30 ML: 20 SOLUTION ORAL; TOPICAL at 19:59

## 2022-02-23 RX ADMIN — ONDANSETRON 4 MG: 4 TABLET, ORALLY DISINTEGRATING ORAL at 19:59

## 2022-02-23 ASSESSMENT — ENCOUNTER SYMPTOMS
HEMATURIA: 0
FREQUENCY: 0
DIARRHEA: 0
VOMITING: 1
DYSURIA: 0
FEVER: 0
CONSTIPATION: 0
ABDOMINAL PAIN: 1
CHILLS: 1
BLOOD IN STOOL: 0
NAUSEA: 1

## 2022-02-24 VITALS
RESPIRATION RATE: 18 BRPM | HEIGHT: 64 IN | TEMPERATURE: 98.3 F | WEIGHT: 232.8 LBS | OXYGEN SATURATION: 100 % | DIASTOLIC BLOOD PRESSURE: 86 MMHG | BODY MASS INDEX: 39.75 KG/M2 | HEART RATE: 68 BPM | SYSTOLIC BLOOD PRESSURE: 125 MMHG

## 2022-02-24 LAB
ALBUMIN UR-MCNC: 20 MG/DL
AMORPH CRY #/AREA URNS HPF: ABNORMAL /HPF
APPEARANCE UR: ABNORMAL
BACTERIA #/AREA URNS HPF: ABNORMAL /HPF
BILIRUB UR QL STRIP: NEGATIVE
COLOR UR AUTO: YELLOW
GLUCOSE UR STRIP-MCNC: NEGATIVE MG/DL
HCG UR QL: NEGATIVE
HGB UR QL STRIP: NEGATIVE
KETONES UR STRIP-MCNC: NEGATIVE MG/DL
LEUKOCYTE ESTERASE UR QL STRIP: ABNORMAL
MUCOUS THREADS #/AREA URNS LPF: PRESENT /LPF
NITRATE UR QL: NEGATIVE
PH UR STRIP: 5.5 [PH] (ref 5–7)
RBC URINE: 5 /HPF
SP GR UR STRIP: 1.03 (ref 1–1.03)
SQUAMOUS EPITHELIAL: 10 /HPF
UROBILINOGEN UR STRIP-MCNC: NORMAL MG/DL
WBC URINE: 14 /HPF

## 2022-02-24 PROCEDURE — 90791 PSYCH DIAGNOSTIC EVALUATION: CPT

## 2022-02-24 PROCEDURE — 81025 URINE PREGNANCY TEST: CPT | Performed by: EMERGENCY MEDICINE

## 2022-02-24 PROCEDURE — 81001 URINALYSIS AUTO W/SCOPE: CPT | Performed by: EMERGENCY MEDICINE

## 2022-02-24 PROCEDURE — 87086 URINE CULTURE/COLONY COUNT: CPT | Performed by: EMERGENCY MEDICINE

## 2022-02-24 RX ORDER — FAMOTIDINE 10 MG
10 TABLET ORAL 2 TIMES DAILY
Qty: 30 TABLET | Refills: 0 | Status: SHIPPED | OUTPATIENT
Start: 2022-02-24 | End: 2022-03-11

## 2022-02-24 NOTE — ED NOTES
2/23/2022  Sadaf AMAN Ross 2000     [facility] Crisis Assessment    Patient was assessed: in person  Patient location: [facility]    Referral Data and Chief Complaint  Pt is a 21 year old female who uses she/her pronouns presents to the ED via EMS and was referred to the ED by self. Patient is presenting to the ED for the following concerns: abdominal pain and suicidal thoughts.      Informed Consent and Assessment Methods    Patient is her own guardian. Writer met with patient and explained the crisis assessment process, including applicable information disclosures and limits to confidentiality, assessed understanding of the process, and obtained consent to proceed with the assessment. Patient was observed to be able to participate in the assessment as evidenced by engaged. Assessment methods included conducting a formal interview with patient, review of medical records, collaboration with medical staff, and obtaining relevant collateral information from family and community providers when available.    Narrative Summary of Presenting Problem and Current Functioning  What led to the patient presenting for crisis services, factors that make the crisis life threatening or complex, stressors, how is this disrupting the patient's life, and how current functioning is in comparison to baseline. How is patient presenting during the assessment.     Pt comes to ED from group home due to reported 2 weeks nausea and vomiting and is noted to have elevated suicidal risk on the ED screening tool.  Pt reports vomiting for 2 weeks, says she does not know why, is on a restricted diet at the group home that she is following (of note, nausea and vomiting have been been noted in chief complaint in several of her past visits since January as well).  She says she has been suicidal for years, no current plan or intent, no change in intensity or frequency.  She says she has been at her particular group home since December 2020, says prior to  that was in an IRTS facility, reports she is her own guardian and is not under a commitment.  She is followed by outpatient psychiatrist, has an appointment with her this Friday, also has a therapist.  She is not involved in other programing, says she sits around and sleeps all day because of depression.  She is able to engage in safety planning, feels safe to return to group home.  She says she can return by cab, however sent by secure ambulance last ED visit as she is a vulnerable adult.    History of the Crisis  Duration of the current crisis, coping skills attempted to reduce the crisis, community resources used, and past presentations.    Multiple ED presentations, has reported she like ambulance rides, chronic suicidal assessments.    Collateral Information    Epic and Care Everywhere records    Risk Assessment    Risk of Harm to Self     ESS-6  1.a. Over the past 2 weeks, have you had thoughts of killing yourself? Yes  1.b. Have you ever attempted to kill yourself and, if yes, when did this last happen? Yes reported in the past 24 hours   2. Recent or current suicide plan? Yes but in assessment does not indicate plan.   3. Recent or current intent to act on ideation? Yes, but again denies in further discussion.  4. Lifetime psychiatric hospitalization? Yes  5. Pattern of excessive substance use? No  6. Current irritability, agitation, or aggression? No  Scoring note: BOTH 1a and 1b must be yes for it to score 1 point, if both are not yes it is zero. All others are 1 point per number. If all questions 1a/1b - 6 are no, risk is negligible. If one of 1a/1b is yes, then risk is mild. If either question 2 or 3, but not both, is yes, then risk is automatically moderate regardless of total score. If both 2 and 3 are yes, risk is automatically high regardless of total score.     Score: 3, moderate risk    The patient has the following risk factors for suicide: depressive symptoms, poor decision making and poor  impulse control    Is the patient experiencing current suicidal ideation: Yes. Passive wish to be dead without thoughts or plan.     Is the patient engaging in preparatory suicide behaviors (formulating how to act on plan, giving away possessions, saying goodbye, displaying dramatic behavior changes, etc)? No    Does the patient have access to firearms or other lethal means? no    The patient has the following protective factors: established relationship community mental health provider(s) and safe/stable housing    Support system information: Staff    Patient strengths: Articulate, able to activate safety plan    Does the patient engage in non-suicidal self-injurious behavior (NSSI/SIB)? no    Is the patient vulnerable to sexual exploitation?  No    Is the patient experiencing abuse or neglect? no    Is the patient a vulnerable adult? Yes      Risk of Harm to Others  The patient has the following risk factors of harm to others: no risk factors identified    Does the patient have thoughts of harming others? No    Is the patient engaging in sexually inappropriate behavior?  no       Current Substance Abuse    Is there recent substance abuse? no    Was a urine drug screen or blood alcohol level obtained: No    Current Symptoms/Concerns    Symptoms  Attention, hyperactivity, and impulsivity symptoms present: Yes: Impulsive    Anxiety symptoms present: Yes: Generalized Symptoms: Excessive worry      Appetite symptoms present: No     Behavioral difficulties present: Yes: Impulsivity/Disinhibition and Withdrawal/Isolation     Cognitive impairment symptoms present: Yes: Judgment/Insight    Depressive symptoms present: Yes Depressed mood, Feelings of helplessness , Isolative , Loss of interest / Anhedonia  and Thoughts of suicide/death      Eating disorder symptoms present: No    Learning disabilities, cognitive challenges, and/or developmental disorder symptoms present: Yes: Developmental Incidents, Functional Impairments  and Mood     Manic/hypomanic symptoms present: No    Personality and interpersonal functioning difficulties present : Yes: Impaired Interpersonal Functioning    Psychosis symptoms present: No      Sleep difficulties present: No    Substance abuse disorder symptoms present: No     Trauma and stressor related symptoms present: No           Mental Status Exam   Affect: Constricted   Appearance: Appropriate    Attention Span/Concentration: Attentive?    Eye Contact: Engaged   Fund of Knowledge: Appropriate    Language /Speech Content: Fluent   Language /Speech Volume: Normal    Language /Speech Rate/Productions: Articulate    Recent Memory: Intact   Remote Memory: Intact   Mood: Depressed    Orientation to Person: Yes    Orientation to Place: Yes   Orientation to Time of Day: Yes    Orientation to Date: Yes    Situation (Do they understand why they are here?): Yes    Psychomotor Behavior: Normal    Thought Content: Clear   Thought Form: Intact       Mental Health and Substance Abuse History    History  Current and historical diagnoses or mental health concerns: BPD, Intellectual disability, BAD    Prior MH services (inpatient, programmatic care, outpatient, etc) : Yes History of inpatient, DTP, DBT, currently in residential placement    Has the patient used [facility] crisis team services before?: Yes Conner    History of substance abuse: No    Prior LIZZETTE services (inpatient, programmatic care, detox, outpatient, etc) : No    History of commitment: No    Family history of MH/LIZZETTE: Yes Father LIZZETTE    Trauma history: No    Medication  Psychotropic medications: Yes, see [facility]    Current Care Team  Primary Care Provider: Yes, Jess Wilkins MD    Psychiatrist: ARCADIO Galindo    Therapist: Yes, clarita Pacheco    : Yes, Jyoti Malik, [phone]    Other: Yes, [facility] [phone]    Release of Information    Was a release of information signed: Yes. Providers included on the release: Franky  Tara Jacobson by pt Sadaf Ross      Biopsychosocial Information    Socioeconomic Information    Pt is a HS graduate with IEP, currently lives in group home, single, no children, not working, receives SSDI.      Relevant Medical Concerns   Patient identifies concerns with completing ADLs? No     Patient can ambulate independently? Yes     Other medical concerns? No     History of concussion or TBI? No        Diagnosis      Borderline Personality D/O F60.3    Intellectual Disability, Mild F    Bipolar unspecified by history F      Therapeutic Intervention  The following therapeutic methodologies were employed when working with the patient: establishing rapport, active listening, assessing dimensions of crisis, solution focused brief therapy, identifying additional supports and alternative coping skills, establishing a discharge plan, safety planning, psychoeducation, motivational interviewing, brief supportive therapy, trauma informed care, treatment planning and harm reduction. Patient response to intervention: engaged.      Disposition  Recommended disposition: Group Home: Return to group home      Reviewed case and recommendations with attending provider. Attending Name: Dr. Ferro      Attending concurs with disposition: Yes      Patient concurs with disposition: Yes      Guardian concurs with disposition: NA     Final disposition: Group home: Return to group home .     Clinical Substantiation of Recommendations   Rationale with supporting factors for disposition and diagnosis.     Pt presents for assessment of nausea and suicidal behavior, risk elevated at baseline, no acute change, recommend discharge, take medications as prescribed, keep scheduled appointments, utilize community crisis services or ED if symptoms worsen.    Assessment Details  Patient interview started at: 0030 and completed at: 0130.    Total duration spent on the patient case in minutes: 1.0 hrs     CPT code(s) utilized: 34680 -  Psychotherapy for Crisis - 60 (30-74*) min       Aftercare and Safety Planning  Follow up plans with MH/LIZZETTE services: Yes See AVS      Aftercare plan placed in the AVS and provided to patient: Yes. Given to patient by LMHP orally and by RN in writing        Morgan Robles, MSW, LICSW

## 2022-02-24 NOTE — ED NOTES
Writer spoke with patient's care taker at the room home (Devyn). Gave report about patient condition and informed her that patient will be discharged back to home via ambalance. Devyn stated that they are ready for the patient and will open the door for her since she has no keys with her.

## 2022-02-24 NOTE — ED PROVIDER NOTES
ED Provider Note  [facility]      History     Chief Complaint   Patient presents with     Vomiting     Abdominal Pain     Suicidal     The history is provided by the patient and medical records.     Sadaf Ross is a 21 year old female with history of BPD, intellectual disability, and frequent ED visits presenting to the ED with complaints of abdominal pain and vomiting.  Patient reports 2 weeks of epigastric pain and vomiting.  She states that she vomits daily in the morning and has constant epigastric pain throughout the day.  She endorses chills, but no measured fever.  Eating makes her pain worse.  Pain worsens throughout the day, so she sleeps to avoid the pain.  She states she has been increasingly fatigued recently.  She states that she has never had these symptoms before.  She reports history of appendectomy.  She was recently taking antibiotics for a wound on her left side, but she has finished the course of antibiotics and the wound has improved.  She denies urinary symptoms, blood in the vomit or stool, constipation, diarrhea, alcohol use, or concern for pregnancy.    Past Medical History  Past Medical History:   Diagnosis Date     ADHD (attention deficit hyperactivity disorder)      Bipolar 1 disorder (H)      Borderline personality disorder (H)      Depression      Depressive disorder      Intellectual disability      Obesity      Syncope      Past Surgical History:   Procedure Laterality Date     APPENDECTOMY       APPENDECTOMY       famotidine (PEPCID) 10 MG tablet  ARIPiprazole ER (ABILIFY MAINTENA) 400 MG extended release inj syringe  benztropine (COGENTIN) 0.5 MG tablet  calcium carbonate (TUMS) 500 MG chewable tablet  chlorhexidine (CHLORHEXIDINE) 0.12 % solution  docusate sodium (COLACE) 100 MG capsule  hydrOXYzine (ATARAX) 50 MG tablet  metoclopramide (REGLAN) 10 MG tablet  naltrexone (DEPADE/REVIA) 50 MG tablet  norgestimate-ethinyl estradiol (SPRINTEC 28)  "0.25-35 MG-MCG tablet  OLANZapine (ZYPREXA) 2.5 MG tablet  OLANZapine zydis (ZYPREXA) 5 MG ODT  omeprazole (PRILOSEC) 40 MG DR capsule  ondansetron (ZOFRAN) 4 MG tablet  polyethylene glycol (MIRALAX) 17 g packet  prochlorperazine (COMPAZINE) 10 MG tablet  promethazine (PHENERGAN) 25 MG suppository  venlafaxine (EFFEXOR-XR) 150 MG 24 hr capsule  Vitamin D, Cholecalciferol, 25 MCG (1000 UT) TABS      Allergies   Allergen Reactions     Penicillins Rash and Unknown     Family History  Family History   Problem Relation Age of Onset     Diabetes Type 1 Father      Cancer Paternal Grandfather      Social History   Social History     Tobacco Use     Smoking status: Current Some Day Smoker     Packs/day: 1.00     Years: 5.00     Pack years: 5.00     Types: Cigarettes     Smokeless tobacco: Never Used   Substance Use Topics     Alcohol use: No     Drug use: No      Past medical history, past surgical history, medications, allergies, family history, and social history were reviewed with the patient. No additional pertinent items.       Review of Systems   Constitutional: Positive for chills. Negative for fever.   Gastrointestinal: Positive for abdominal pain, nausea and vomiting. Negative for blood in stool, constipation and diarrhea.   Genitourinary: Negative for dysuria, frequency, hematuria and urgency.   All other systems reviewed and are negative.    A complete review of systems was performed with pertinent positives and negatives noted in the HPI, and all other systems negative.    Physical Exam   BP: (!) 150/94  Pulse: 84  Temp: 97.8  F (36.6  C)  Resp: 14  Height: 162.6 cm (5' 4\")  Weight: 105.6 kg (232 lb 12.8 oz)  SpO2: 99 %  Physical Exam  General: awake, alert, NAD  Head: normal cephalic  HEENT: pupils equal, conjugate gaze intact  Neck: Supple  CV: regular rate and rhythm without murmur  Lungs: clear to auscultation  Abd: soft, non-tender, no guarding, no peritoneal signs  EXT: lower extremities without swelling " or edema  Neuro: awake, answers questions appropriately. No focal deficits noted     ED Course      Procedures       The medical record was reviewed and interpreted.  Current labs reviewed and interpreted.  Previous labs reviewed and interpreted.           Results for orders placed or performed during the hospital encounter of 02/23/22   Abdomen US, limited (RUQ only)     Status: None    Narrative    EXAM: US ABDOMEN LIMITED  LOCATION: [facility]  DATE/TIME: 2/23/2022 11:25 PM    INDICATION: abdominal pain vomiting after eating  COMPARISON: None.  TECHNIQUE: Limited abdominal ultrasound.    FINDINGS:    GALLBLADDER: Normal. No gallstones, wall thickening, or pericholecystic fluid. Negative sonographic Santana's sign.    BILE DUCTS: No biliary dilatation. The common duct measures 3 mm.    LIVER: Fatty liver. No focal mass.    RIGHT KIDNEY: No hydronephrosis.    PANCREAS: The visualized portions are normal.    No ascites.      Impression    IMPRESSION:  1.  Normal gallbladder.    2.  Fatty liver.       Comprehensive metabolic panel     Status: Abnormal   Result Value Ref Range    Sodium 140 133 - 144 mmol/L    Potassium 3.7 3.4 - 5.3 mmol/L    Chloride 110 (H) 94 - 109 mmol/L    Carbon Dioxide (CO2) 25 20 - 32 mmol/L    Anion Gap 5 3 - 14 mmol/L    Urea Nitrogen 14 7 - 30 mg/dL    Creatinine 0.80 0.52 - 1.04 mg/dL    Calcium 9.1 8.5 - 10.1 mg/dL    Glucose 119 (H) 70 - 99 mg/dL    Alkaline Phosphatase 50 40 - 150 U/L    AST 12 0 - 45 U/L    ALT 30 0 - 50 U/L    Protein Total 7.8 6.8 - 8.8 g/dL    Albumin 4.1 3.4 - 5.0 g/dL    Bilirubin Total 0.3 0.2 - 1.3 mg/dL    GFR Estimate >90 >60 mL/min/1.73m2   Lipase     Status: Normal   Result Value Ref Range    Lipase 135 73 - 393 U/L   CBC with platelets and differential     Status: None   Result Value Ref Range    WBC Count 9.2 4.0 - 11.0 10e3/uL    RBC Count 4.44 3.80 - 5.20 10e6/uL    Hemoglobin 12.7 11.7 - 15.7 g/dL     Hematocrit 37.2 35.0 - 47.0 %    MCV 84 78 - 100 fL    MCH 28.6 26.5 - 33.0 pg    MCHC 34.1 31.5 - 36.5 g/dL    RDW 12.7 10.0 - 15.0 %    Platelet Count 248 150 - 450 10e3/uL    % Neutrophils 65 %    % Lymphocytes 29 %    % Monocytes 4 %    % Eosinophils 2 %    % Basophils 0 %    % Immature Granulocytes 0 %    NRBCs per 100 WBC 0 <1 /100    Absolute Neutrophils 6.0 1.6 - 8.3 10e3/uL    Absolute Lymphocytes 2.7 0.8 - 5.3 10e3/uL    Absolute Monocytes 0.3 0.0 - 1.3 10e3/uL    Absolute Eosinophils 0.2 0.0 - 0.7 10e3/uL    Absolute Basophils 0.0 0.0 - 0.2 10e3/uL    Absolute Immature Granulocytes 0.0 <=0.4 10e3/uL    Absolute NRBCs 0.0 10e3/uL   CBC with platelets differential     Status: None    Narrative    The following orders were created for panel order CBC with platelets differential.  Procedure                               Abnormality         Status                     ---------                               -----------         ------                     CBC with platelets and d...[[id number]]                      Final result                 Please view results for these tests on the individual orders.     Medications   ondansetron (ZOFRAN-ODT) ODT tab 4 mg (4 mg Oral Given 2/23/22 1959)   lidocaine (viscous) (XYLOCAINE) 2 % 15 mL, alum & mag hydroxide-simethicone (MAALOX) 15 mL GI Cocktail (30 mLs Oral Given 2/23/22 1959)        Assessments & Plan (with Medical Decision Making)   Patient presents to the emergency department daily vomiting x2 weeks.      Here she is awake, alert, cooperative.  She has a benign abdomen.  She is mildly hypertensive but otherwise has normal vital signs.    Differential would include things such as gastritis, gastric ulcer, potentially cholecystitis or choledocholithiasis, pregnancy.    Initial evaluation will include laboratory studies we will also give Zofran and a a GI cocktail and reassess.    Patient has normal white count, no left shift this is reassuring after 2 weeks worth  of symptoms.  Patient's LFTs are normal.  She also has a normal lipase.  On reassessment she reported her symptoms have resolved with Zofran and a GI cocktail.    Labs notable for normal LFTs, normal lipase normal white count, no left shift.     Abdominal ultrasound was obtained showed no gallbladder pathology, fatty liver noted.    Urine pregnancy test still pending at this time.  When she has negative pregnancy test do not believe she needs further evaluation of her abdominal pain given negative laboratory studies and negative imaging and improvement with GI cocktail.  Would recommend a trial of ranitidine to see if this helps her symptoms.  Patient will also have a mental health assessment given her suicidal ideation.    She will be signed out to Dr. Ferro will follow up on her UA and mental health assessment.    I have reviewed the nursing notes. I have reviewed the findings, diagnosis, plan and need for follow up with the patient.    New Prescriptions    FAMOTIDINE (PEPCID) 10 MG TABLET    Take 1 tablet (10 mg) by mouth 2 times daily for 15 days       Final diagnoses:   Abdominal pain, epigastric   I, Jami Martin, am serving as a trained medical scribe to document services personally performed by Bernardino Schwarz MD, based on the provider's statements to me.     I, Bernardino Schwarz MD, was physically present and have reviewed and verified the accuracy of this note documented by Jami Martin.      --  Bernardino Schwarz MD  [facility] EMERGENCY DEPARTMENT  2/23/2022     Bernardino Schwarz MD  02/24/22 0046

## 2022-02-24 NOTE — DISCHARGE INSTRUCTIONS
Follow-up with your established mental health providers  Take Pepcid as directed    Aftercare Plan  If I am feeling unsafe or I am in a crisis, I will:   Contact my established care providers   Call the National Suicide Prevention Lifeline: [phone]   Go to the nearest emergency room   Call 154     Warning signs that I or other people might notice when a crisis is developing for me: Active suicidal planning or intent.    Things I am able to do on my own to cope or help me feel better: Use my coping skills, listen to music, see therapist, take medications     Your [facility] has a mental health crisis team you can call 24/7: [facility] Mobile Crisis  [phone]

## 2022-02-24 NOTE — ED NOTES
"[facility] ED Mental Health Handoff Note:       Brief HPI:  This is a 21 year old female signed out to me by Dr. Schwarz.  See initial ED Provider note for full details of the presentation. Interval history is pertinent for BEC eval complete.    Home meds reviewed and ordered/administered: No    Medically stable for inpatient mental health admission: Yes.    Evaluated by mental health: Yes. The recommendation is for outpatient mental health treatment. Resources and plan given to patient.    Safety concerns: At the time I received sign out, there were no safety concerns.    Hold Status:  Active Orders   N/A            Exam:   Patient Vitals for the past 24 hrs:   BP Temp Temp src Pulse Resp SpO2 Height Weight   02/23/22 1855 (!) 150/94 97.8  F (36.6  C) Oral 84 14 99 % 1.626 m (5' 4\") 105.6 kg (232 lb 12.8 oz)           ED Course:    Patient seen by mental health . Patient with history of BPD and chronic SI. She is here for 2 weeks of nausea which is improved after meds in the ED. She has no suicidal intent now. She is ready to go back to her group home. She is her own guardian. She sees a therapist and psychiatrist. She has an appointment with her psychiatrist on Friday (2/25).       Medications   ondansetron (ZOFRAN-ODT) ODT tab 4 mg (4 mg Oral Given 2/23/22 1959)   lidocaine (viscous) (XYLOCAINE) 2 % 15 mL, alum & mag hydroxide-simethicone (MAALOX) 15 mL GI Cocktail (30 mLs Oral Given 2/23/22 1959)            There were significant events during my shift.        Impression:    ICD-10-CM    1. Abdominal pain, epigastric  R10.13        Plan:    Discharge to home with outpatient follow-up with already established MH providers.      RESULTS:   Results for orders placed or performed during the hospital encounter of 02/23/22 (from the past 24 hour(s))   Comprehensive metabolic panel     Status: Abnormal    Collection Time: 02/23/22  7:49 PM   Result Value Ref Range    Sodium 140 133 - 144 mmol/L    " Potassium 3.7 3.4 - 5.3 mmol/L    Chloride 110 (H) 94 - 109 mmol/L    Carbon Dioxide (CO2) 25 20 - 32 mmol/L    Anion Gap 5 3 - 14 mmol/L    Urea Nitrogen 14 7 - 30 mg/dL    Creatinine 0.80 0.52 - 1.04 mg/dL    Calcium 9.1 8.5 - 10.1 mg/dL    Glucose 119 (H) 70 - 99 mg/dL    Alkaline Phosphatase 50 40 - 150 U/L    AST 12 0 - 45 U/L    ALT 30 0 - 50 U/L    Protein Total 7.8 6.8 - 8.8 g/dL    Albumin 4.1 3.4 - 5.0 g/dL    Bilirubin Total 0.3 0.2 - 1.3 mg/dL    GFR Estimate >90 >60 mL/min/1.73m2   Lipase     Status: Normal    Collection Time: 02/23/22  7:49 PM   Result Value Ref Range    Lipase 135 73 - 393 U/L   CBC with platelets differential     Status: None    Collection Time: 02/23/22  7:49 PM    Narrative    The following orders were created for panel order CBC with platelets differential.  Procedure                               Abnormality         Status                     ---------                               -----------         ------                     CBC with platelets and d...[[id number]]                      Final result                 Please view results for these tests on the individual orders.   CBC with platelets and differential     Status: None    Collection Time: 02/23/22  7:49 PM   Result Value Ref Range    WBC Count 9.2 4.0 - 11.0 10e3/uL    RBC Count 4.44 3.80 - 5.20 10e6/uL    Hemoglobin 12.7 11.7 - 15.7 g/dL    Hematocrit 37.2 35.0 - 47.0 %    MCV 84 78 - 100 fL    MCH 28.6 26.5 - 33.0 pg    MCHC 34.1 31.5 - 36.5 g/dL    RDW 12.7 10.0 - 15.0 %    Platelet Count 248 150 - 450 10e3/uL    % Neutrophils 65 %    % Lymphocytes 29 %    % Monocytes 4 %    % Eosinophils 2 %    % Basophils 0 %    % Immature Granulocytes 0 %    NRBCs per 100 WBC 0 <1 /100    Absolute Neutrophils 6.0 1.6 - 8.3 10e3/uL    Absolute Lymphocytes 2.7 0.8 - 5.3 10e3/uL    Absolute Monocytes 0.3 0.0 - 1.3 10e3/uL    Absolute Eosinophils 0.2 0.0 - 0.7 10e3/uL    Absolute Basophils 0.0 0.0 - 0.2 10e3/uL    Absolute Immature  Granulocytes 0.0 <=0.4 10e3/uL    Absolute NRBCs 0.0 10e3/uL   Extra Tube     Status: None    Collection Time: 02/23/22  7:49 PM    Narrative    The following orders were created for panel order Extra Tube.  Procedure                               Abnormality         Status                     ---------                               -----------         ------                     Extra Red Top Tube[[id number]]                               Final result                 Please view results for these tests on the individual orders.   Extra Red Top Tube     Status: None    Collection Time: 02/23/22  7:49 PM   Result Value Ref Range    Hold Specimen [facility]    Abdomen US, limited (RUQ only)     Status: None    Collection Time: 02/23/22 11:37 PM    Narrative    EXAM: US ABDOMEN LIMITED  LOCATION: [facility]  DATE/TIME: 2/23/2022 11:25 PM    INDICATION: abdominal pain vomiting after eating  COMPARISON: None.  TECHNIQUE: Limited abdominal ultrasound.    FINDINGS:    GALLBLADDER: Normal. No gallstones, wall thickening, or pericholecystic fluid. Negative sonographic Santana's sign.    BILE DUCTS: No biliary dilatation. The common duct measures 3 mm.    LIVER: Fatty liver. No focal mass.    RIGHT KIDNEY: No hydronephrosis.    PANCREAS: The visualized portions are normal.    No ascites.      Impression    IMPRESSION:  1.  Normal gallbladder.    2.  Fatty liver.               Jami MCGINNIS, am serving as a trained medical scribe to document services personally performed by Ambrocio Ferro MD, based on the provider's statements to me.     Ambrocio MCGINNIS MD, was physically present and have reviewed and verified the accuracy of this note documented by Jami Martin.                           Ambrocio Ferro MD  02/24/22 0140

## 2022-02-24 NOTE — ED TRIAGE NOTES
Brought in by EMS from  for vomiting, mid - generalized abd pain and dizziness x 2 weeks.   .    Recently adjusted her meds from taking them in the morning to taking them at night. Pt vomited this AM and states she took her meds PTA and has had nausea without vomiting. pt had dinner and ate soup. Pt has been been restricting her food intake for the last 1.5 weeks.  Pt states she does have emesis post food intake as well.     Pt also notes being SI with plan to cut herself. Admits she was biting herself last night.

## 2022-02-25 ENCOUNTER — PATIENT OUTREACH (OUTPATIENT)
Dept: CARE COORDINATION | Facility: CLINIC | Age: 22
End: 2022-02-25
Payer: MEDICARE

## 2022-02-25 DIAGNOSIS — Z71.89 OTHER SPECIFIED COUNSELING: ICD-10-CM

## 2022-02-25 NOTE — PROGRESS NOTES
Clinic Care Coordination Contact  Community Health Worker Initial Outreach    Pt stated having suicidal thoughts, feeling anxitey, requested to talk with SW. CHW completed post discharge note.         Clinic Care Coordination Contact  RACHAEL Parker: Post-Discharge Note  SITUATION                                                      Admission:    Admission Date: 02/23/22   Reason for Admission:  Vomiting Abdominal Pain Suicidal  Discharge:   Discharge Date: 02/24/22  Discharge Diagnosis:  Vomiting Abdominal Pain Suicidal    BACKGROUND                                                      Per hospital discharge summary and inpatient provider notes:  Sadaf Ross is a 21 year old female with history of BPD, intellectual disability, and frequent ED visits presenting to the ED with complaints of abdominal pain and vomiting.  Patient reports 2 weeks of epigastric pain and vomiting.      ASSESSMENT      Enrollment  Primary Care Care Coordination Status: Not a Candidate    Discharge Assessment  How are you doing now that you are home?: getting worse  How are your symptoms? (Red Flag symptoms escalate to triage hotline per guidelines): Worsening  Do you feel your condition is stable enough to be safe at home until your provider visit?: No (see comment)  Does the patient have their discharge instructions? : No - Review discharge instructions  Does the patient have questions regarding their discharge instructions? : No  Were you started on any new medications or were there changes to any of your previous medications? : Yes  Does the patient have all of their medications?: Yes  Do you have questions regarding any of your medications? : No  Do you have all of your needed medical supplies or equipment (DME)?  (i.e. oxygen tank, CPAP, cane, etc.): No - What equipment or supplies are needed?  Discharge follow-up appointment scheduled within 14 calendar days? : No  Is patient agreeable to assistance with scheduling? : Yes  (need assist for mental health provider)    Post-op (W CTA Only)  If the patient had a surgery or procedure, do they have any questions for a nurse?: No         PLAN                                                      Outpatient Plan:     No future appointments.    Follow-up with your established mental health  Providers    For any urgent concerns, please contact our 24 hour nurse triage line: [phone] ([phone])       ARACELI Iverson  [phone]  [facility]    2:07 PM  Caller spoke with Pt to discuss concerns shared by Pt and her request for mental health services. Pt shared that she lives in a group home and spoke with her psychiatrist earlier this morning and sees her therapist every Tuesday. Pt will continue to follow up with these providers.    GREG Salgado  [phone]  [facility]

## 2022-02-26 ENCOUNTER — HOSPITAL ENCOUNTER (EMERGENCY)
Facility: CLINIC | Age: 22
Discharge: HOME | End: 2022-02-26
Attending: EMERGENCY MEDICINE | Admitting: EMERGENCY MEDICINE
Payer: MEDICARE

## 2022-02-26 VITALS
DIASTOLIC BLOOD PRESSURE: 83 MMHG | RESPIRATION RATE: 18 BRPM | OXYGEN SATURATION: 98 % | TEMPERATURE: 98.4 F | SYSTOLIC BLOOD PRESSURE: 111 MMHG | HEART RATE: 88 BPM

## 2022-02-26 DIAGNOSIS — G89.29 CHRONIC ABDOMINAL PAIN: ICD-10-CM

## 2022-02-26 DIAGNOSIS — R10.9 CHRONIC ABDOMINAL PAIN: ICD-10-CM

## 2022-02-26 DIAGNOSIS — R45.851 SUICIDAL IDEATION: ICD-10-CM

## 2022-02-26 DIAGNOSIS — F41.9 ANXIETY: ICD-10-CM

## 2022-02-26 LAB
AMPHETAMINES UR QL SCN: NORMAL
ANION GAP SERPL CALCULATED.3IONS-SCNC: 5 MMOL/L (ref 3–14)
BACTERIA UR CULT: NORMAL
BARBITURATES UR QL: NORMAL
BASOPHILS # BLD AUTO: 0.1 10E3/UL (ref 0–0.2)
BASOPHILS NFR BLD AUTO: 1 %
BENZODIAZ UR QL: NORMAL
BUN SERPL-MCNC: 14 MG/DL (ref 7–30)
CALCIUM SERPL-MCNC: 9.3 MG/DL (ref 8.5–10.1)
CANNABINOIDS UR QL SCN: NORMAL
CHLORIDE BLD-SCNC: 110 MMOL/L (ref 94–109)
CO2 SERPL-SCNC: 24 MMOL/L (ref 20–32)
COCAINE UR QL: NORMAL
CREAT SERPL-MCNC: 0.85 MG/DL (ref 0.52–1.04)
EOSINOPHIL # BLD AUTO: 0.2 10E3/UL (ref 0–0.7)
EOSINOPHIL NFR BLD AUTO: 2 %
ERYTHROCYTE [DISTWIDTH] IN BLOOD BY AUTOMATED COUNT: 12.7 % (ref 10–15)
GFR SERPL CREATININE-BSD FRML MDRD: >90 ML/MIN/1.73M2
GLUCOSE BLD-MCNC: 96 MG/DL (ref 70–99)
HCG UR QL: NEGATIVE
HCT VFR BLD AUTO: 37.9 % (ref 35–47)
HGB BLD-MCNC: 12.7 G/DL (ref 11.7–15.7)
IMM GRANULOCYTES # BLD: 0 10E3/UL
IMM GRANULOCYTES NFR BLD: 0 %
LIPASE SERPL-CCNC: 188 U/L (ref 73–393)
LYMPHOCYTES # BLD AUTO: 3 10E3/UL (ref 0.8–5.3)
LYMPHOCYTES NFR BLD AUTO: 31 %
MCH RBC QN AUTO: 28.1 PG (ref 26.5–33)
MCHC RBC AUTO-ENTMCNC: 33.5 G/DL (ref 31.5–36.5)
MCV RBC AUTO: 84 FL (ref 78–100)
MONOCYTES # BLD AUTO: 0.6 10E3/UL (ref 0–1.3)
MONOCYTES NFR BLD AUTO: 6 %
NEUTROPHILS # BLD AUTO: 5.9 10E3/UL (ref 1.6–8.3)
NEUTROPHILS NFR BLD AUTO: 60 %
NRBC # BLD AUTO: 0 10E3/UL
NRBC BLD AUTO-RTO: 0 /100
OPIATES UR QL SCN: NORMAL
PLATELET # BLD AUTO: 279 10E3/UL (ref 150–450)
POTASSIUM BLD-SCNC: 4.1 MMOL/L (ref 3.4–5.3)
RBC # BLD AUTO: 4.52 10E6/UL (ref 3.8–5.2)
SODIUM SERPL-SCNC: 139 MMOL/L (ref 133–144)
WBC # BLD AUTO: 9.7 10E3/UL (ref 4–11)

## 2022-02-26 PROCEDURE — 83690 ASSAY OF LIPASE: CPT | Performed by: EMERGENCY MEDICINE

## 2022-02-26 PROCEDURE — 36415 COLL VENOUS BLD VENIPUNCTURE: CPT | Performed by: EMERGENCY MEDICINE

## 2022-02-26 PROCEDURE — 99285 EMERGENCY DEPT VISIT HI MDM: CPT | Mod: 25

## 2022-02-26 PROCEDURE — 96374 THER/PROPH/DIAG INJ IV PUSH: CPT

## 2022-02-26 PROCEDURE — 80307 DRUG TEST PRSMV CHEM ANLYZR: CPT | Performed by: EMERGENCY MEDICINE

## 2022-02-26 PROCEDURE — 96375 TX/PRO/DX INJ NEW DRUG ADDON: CPT

## 2022-02-26 PROCEDURE — 93005 ELECTROCARDIOGRAM TRACING: CPT

## 2022-02-26 PROCEDURE — 99285 EMERGENCY DEPT VISIT HI MDM: CPT | Mod: GC | Performed by: EMERGENCY MEDICINE

## 2022-02-26 PROCEDURE — 81025 URINE PREGNANCY TEST: CPT | Performed by: EMERGENCY MEDICINE

## 2022-02-26 PROCEDURE — 250N000011 HC RX IP 250 OP 636: Performed by: EMERGENCY MEDICINE

## 2022-02-26 PROCEDURE — 82310 ASSAY OF CALCIUM: CPT | Performed by: EMERGENCY MEDICINE

## 2022-02-26 PROCEDURE — 85025 COMPLETE CBC W/AUTO DIFF WBC: CPT | Performed by: EMERGENCY MEDICINE

## 2022-02-26 RX ORDER — ONDANSETRON 2 MG/ML
4 INJECTION INTRAMUSCULAR; INTRAVENOUS EVERY 30 MIN PRN
Status: DISCONTINUED | OUTPATIENT
Start: 2022-02-26 | End: 2022-02-26 | Stop reason: HOSPADM

## 2022-02-26 RX ORDER — LORAZEPAM 2 MG/ML
1 INJECTION INTRAMUSCULAR ONCE
Status: COMPLETED | OUTPATIENT
Start: 2022-02-26 | End: 2022-02-26

## 2022-02-26 RX ADMIN — LORAZEPAM 1 MG: 2 INJECTION INTRAMUSCULAR; INTRAVENOUS at 19:09

## 2022-02-26 RX ADMIN — ONDANSETRON 4 MG: 2 INJECTION INTRAMUSCULAR; INTRAVENOUS at 19:06

## 2022-02-26 ASSESSMENT — ENCOUNTER SYMPTOMS
DYSURIA: 0
DIFFICULTY URINATING: 0
FEVER: 0
SORE THROAT: 0
CHEST TIGHTNESS: 0
ABDOMINAL PAIN: 1
DIAPHORESIS: 1
NAUSEA: 1
DIARRHEA: 0
VOMITING: 1
CHILLS: 1
CONSTIPATION: 0
MYALGIAS: 0
FATIGUE: 0
SHORTNESS OF BREATH: 1

## 2022-02-26 NOTE — ED TRIAGE NOTES
Pt reports that she has an abdominal procedure on Monday that will take 2 hours.  She states that she is feeling anxious about this and it is causing abdominal pain and chest pain related to this.

## 2022-02-27 ENCOUNTER — HOSPITAL ENCOUNTER (EMERGENCY)
Facility: CLINIC | Age: 22
Discharge: HOME | End: 2022-02-27
Attending: EMERGENCY MEDICINE | Admitting: EMERGENCY MEDICINE
Payer: MEDICARE

## 2022-02-27 ENCOUNTER — NURSE TRIAGE (OUTPATIENT)
Dept: NURSING | Facility: CLINIC | Age: 22
End: 2022-02-27
Payer: MEDICARE

## 2022-02-27 VITALS
TEMPERATURE: 97.6 F | OXYGEN SATURATION: 98 % | SYSTOLIC BLOOD PRESSURE: 139 MMHG | RESPIRATION RATE: 16 BRPM | HEART RATE: 104 BPM | DIASTOLIC BLOOD PRESSURE: 84 MMHG

## 2022-02-27 DIAGNOSIS — F60.3 BORDERLINE PERSONALITY DISORDER (H): ICD-10-CM

## 2022-02-27 LAB
ATRIAL RATE - MUSE: 73 BPM
DIASTOLIC BLOOD PRESSURE - MUSE: NORMAL MMHG
INTERPRETATION ECG - MUSE: NORMAL
P AXIS - MUSE: 46 DEGREES
PR INTERVAL - MUSE: 178 MS
QRS DURATION - MUSE: 88 MS
QT - MUSE: 372 MS
QTC - MUSE: 409 MS
R AXIS - MUSE: 29 DEGREES
SYSTOLIC BLOOD PRESSURE - MUSE: NORMAL MMHG
T AXIS - MUSE: 6 DEGREES
VENTRICULAR RATE- MUSE: 73 BPM

## 2022-02-27 PROCEDURE — 99284 EMERGENCY DEPT VISIT MOD MDM: CPT | Performed by: EMERGENCY MEDICINE

## 2022-02-27 PROCEDURE — 99285 EMERGENCY DEPT VISIT HI MDM: CPT | Mod: 25 | Performed by: EMERGENCY MEDICINE

## 2022-02-27 PROCEDURE — 250N000013 HC RX MED GY IP 250 OP 250 PS 637: Performed by: EMERGENCY MEDICINE

## 2022-02-27 PROCEDURE — 90791 PSYCH DIAGNOSTIC EVALUATION: CPT

## 2022-02-27 RX ORDER — LORAZEPAM 1 MG/1
2 TABLET ORAL ONCE
Status: COMPLETED | OUTPATIENT
Start: 2022-02-27 | End: 2022-02-27

## 2022-02-27 RX ADMIN — LORAZEPAM 2 MG: 1 TABLET ORAL at 16:33

## 2022-02-27 ASSESSMENT — ENCOUNTER SYMPTOMS: HALLUCINATIONS: 0

## 2022-02-27 NOTE — ED TRIAGE NOTES
Patient reports suicidal ideation and wanting to punch others relating to a doctor's appointment she has tomorrow. Patient feels extremely stressed and anxious about the appointment and did not feel like she could keep herself safe at home.

## 2022-02-27 NOTE — ED NOTES
Lives in group home, dad passed away in Feb 2021. Now has EGD Monday is worried. Came in with CP, ABD pain and SI thoughts.

## 2022-02-27 NOTE — TELEPHONE ENCOUNTER
"Call  From patient who  Has history if intellectual  Disability and mental illness and resides in a group homel; non [facility]  PCP   Patient I yun [facility] ED;Patient was seen in ED yesterday for same complaints     States she is nauseated after eating  fish sandwich and she is having suicidal ideation about cutting wrist with a plastic knife and states she does not have and knife but can find one   Triage protocol and EMR reviewed  Advised in home care for nausea  Patient has an EGD scheduled for tomorrow; patient has  PRN nausea medicine   Patient states that  whe she has these problems :they take me to the  ED\"     Caller states she has not informed group home caregiver about her current symptoms   FNA spoke with caregiver ( using  Callers cell phone) who  Does not celia to know how to proceed and   States she has a nurse she needs to call   Patient  expresses anger at caregiver and  hangs up phone.   Called  Phone #  on patient's  EMR and  reached a nurse  \"Alrene\" who identifies as the on call nurse for the group home. Explained current situation   Arlene state she will call  group home and  assume  Responsibility for directing care for patient's current complaints   Heather Jane RN  FNA          Reason for Disposition    Nausea is a chronic symptom (recurrent or ongoing AND present > 4 weeks)    Additional Information    Negative: Shock suspected (e.g., cold/pale/clammy skin, too weak to stand, low BP, rapid pulse)    Negative: Sounds like a life-threatening emergency to the triager    Negative: [1] Nausea or vomiting AND [2] pregnancy < 20 weeks    Negative: Menstrual Period - Missed or Late (i.e., pregnancy suspected)    Negative: Heat exhaustion suspected (i.e., dehydration from heat exposure)    Negative: Motion sickness suspected (i.e., nausea with car, plane, boat, or train travel)    Negative: Anxiety or stress suspected (i.e., nausea with anxiety attacks or stressful situations)    Negative: Traumatic Brain " Injury (TBI) suspected    Negative: Nausea (or Vomiting) in a cancer patient who is currently (or recently) receiving chemotherapy or radiation therapy, or cancer patient who has metastatic or end-stage cancer and is receiving palliative care    Negative: Vomiting occurs    Negative: Other symptom is present, see that guideline.  (e.g., chest pain, headache, dizziness, abdominal pain, colds, sore throat, etc.).    Negative: Unable to walk, or can only walk with assistance (e.g., requires support)    Negative: Difficulty breathing    Negative: [1] Insulin-dependent diabetes (Type I) AND [2] glucose > 400 mg/dl (22 mmol/l)    Negative: [1] Drinking very little AND [2] dehydration suspected (e.g., no urine > 12 hours, very dry mouth, very lightheaded)    Negative: Patient sounds very sick or weak to the triager    Negative: Fever > 104 F (40 C)    Negative: [1] Fever > 101 F (38.3 C) AND [2] age > 60    Negative: [1] Fever > 100.0 F (37.8 C) AND [2] bedridden (e.g., nursing home patient, CVA, chronic illness, recovering from surgery)    Negative: [1] Fever > 100.0 F (37.8 C) AND [2] diabetes mellitus or weak immune system (e.g., HIV positive, cancer chemo, splenectomy, organ transplant, chronic steroids)    Negative: Taking any of the following medications: digoxin (Lanoxin), lithium, theophylline, phenytoin (Dilantin)    Negative: Yellowish color of the skin or white of the eye (i.e., jaundice)    Negative: Fever present > 3 days (72 hours)    Negative: Receiving cancer chemotherapy medication    Negative: Taking prescription medication that could cause nausea (e.g., narcotics/opiates, antibiotics, OCPs, many others)    Negative: Nausea lasts > 1 week    Negative: Alcohol or drug abuse, known or suspected    Protocols used: NAUSEA-A-AH

## 2022-02-27 NOTE — DISCHARGE INSTRUCTIONS
Follow-up with your primary care provider.  Return to the emergency department as needed for any new or worsening symptoms.

## 2022-02-27 NOTE — ED PROVIDER NOTES
ED Provider Note  [facility]      History     Chief Complaint   Patient presents with     Abdominal Pain     x 3 weeks     Anxiety     reports chest pain related to anxiety     Chest Pain     upper left x3 weeks     HPI  Sadaf Ross is a 21 year old female with a history BPD, intellectual disability, and frequent ED visits presenting who comes in with a year of abdominal pain.     Abdominal pain started after her fathers death on 02/17/2021. It has been constant. Improving with laying down. Worsening with eating. She is also nauseated every morning and vomits. Reports a history of appendectomy. No fevers, fatigue, chest tightness, constipation, diarrhea, any urinary symptoms, or any other infectious symptoms.     Additionally, she has episodes of chest pain associated with chills, diaphoresis, and shortness of breath. These episodes are short and always self-resolve. She also has felt more urges to cut her wrists to kill herself. She has not cut herself before. These episodes have become more frequent because of increased anxiety around [facility] EGD procedure scheduled.     Social history: 7-pack per day smoker. No alcohol use. Lives in group home. Mother is not speaking to her.      Review of Systems   Constitutional: Positive for chills and diaphoresis. Negative for fatigue and fever.   HENT: Negative for sore throat.    Respiratory: Positive for shortness of breath. Negative for chest tightness.    Cardiovascular: Positive for chest pain.   Gastrointestinal: Positive for abdominal pain, nausea and vomiting. Negative for constipation and diarrhea.   Genitourinary: Negative for difficulty urinating and dysuria.   Musculoskeletal: Negative for myalgias.     A complete review of systems was performed with pertinent positives and negatives noted in the HPI, and all other systems negative.    Physical Exam   BP: 129/67  Pulse: 89  Temp: 98.2  F (36.8  C)  Resp: 16  SpO2: 97 %  Physical  Exam  Vitals and nursing note reviewed.   Constitutional:       Appearance: She is obese.   HENT:      Head: Normocephalic and atraumatic.      Right Ear: External ear normal.      Left Ear: External ear normal.      Nose: Nose normal.      Mouth/Throat:      Mouth: Mucous membranes are moist.      Pharynx: Oropharynx is clear.   Eyes:      Extraocular Movements: Extraocular movements intact.      Pupils: Pupils are equal, round, and reactive to light.   Cardiovascular:      Rate and Rhythm: Normal rate and regular rhythm.   Pulmonary:      Effort: Pulmonary effort is normal.      Breath sounds: Normal breath sounds.   Abdominal:      General: Bowel sounds are normal.      Palpations: Abdomen is soft.      Tenderness: There is abdominal tenderness in the epigastric area.   Musculoskeletal:         General: No swelling. Normal range of motion.   Skin:     General: Skin is warm and dry.   Neurological:      General: No focal deficit present.      Mental Status: She is alert and oriented to person, place, and time.   Psychiatric:         Mood and Affect: Mood is anxious.         Behavior: Behavior normal.      Comments: anxious       ED Course       6:02 PM Medical student saw and assessed the patient.   6:32 PM Dr. Sinha saw and assessed the patient    Procedures            EKG Interpretation:      Interpreted by Richie Sinha  Time reviewed: 1830  Symptoms at time of EKG: chest pain  Rhythm: normal sinus   Rate: normal  Axis: normal  Ectopy: none  Conduction: normal  ST Segments/ T Waves: No ST-T wave changes  Q Waves: none  Comparison to prior: Unchanged    Clinical Impression: normal EKG          Results for orders placed or performed during the hospital encounter of 02/26/22   Basic metabolic panel     Status: Abnormal   Result Value Ref Range    Sodium 139 133 - 144 mmol/L    Potassium 4.1 3.4 - 5.3 mmol/L    Chloride 110 (H) 94 - 109 mmol/L    Carbon Dioxide (CO2) 24 20 - 32 mmol/L    Anion Gap 5 3 - 14  mmol/L    Urea Nitrogen 14 7 - 30 mg/dL    Creatinine 0.85 0.52 - 1.04 mg/dL    Calcium 9.3 8.5 - 10.1 mg/dL    Glucose 96 70 - 99 mg/dL    GFR Estimate >90 >60 mL/min/1.73m2   Lipase     Status: Normal   Result Value Ref Range    Lipase 188 73 - 393 U/L   CBC with platelets and differential     Status: None   Result Value Ref Range    WBC Count 9.7 4.0 - 11.0 10e3/uL    RBC Count 4.52 3.80 - 5.20 10e6/uL    Hemoglobin 12.7 11.7 - 15.7 g/dL    Hematocrit 37.9 35.0 - 47.0 %    MCV 84 78 - 100 fL    MCH 28.1 26.5 - 33.0 pg    MCHC 33.5 31.5 - 36.5 g/dL    RDW 12.7 10.0 - 15.0 %    Platelet Count 279 150 - 450 10e3/uL    % Neutrophils 60 %    % Lymphocytes 31 %    % Monocytes 6 %    % Eosinophils 2 %    % Basophils 1 %    % Immature Granulocytes 0 %    NRBCs per 100 WBC 0 <1 /100    Absolute Neutrophils 5.9 1.6 - 8.3 10e3/uL    Absolute Lymphocytes 3.0 0.8 - 5.3 10e3/uL    Absolute Monocytes 0.6 0.0 - 1.3 10e3/uL    Absolute Eosinophils 0.2 0.0 - 0.7 10e3/uL    Absolute Basophils 0.1 0.0 - 0.2 10e3/uL    Absolute Immature Granulocytes 0.0 <=0.4 10e3/uL    Absolute NRBCs 0.0 10e3/uL   HCG qualitative urine     Status: Normal   Result Value Ref Range    hCG Urine Qualitative Negative Negative   EKG 12 lead     Status: None (Preliminary result)   Result Value Ref Range    Systolic Blood Pressure  mmHg    Diastolic Blood Pressure  mmHg    Ventricular Rate 73 BPM    Atrial Rate 73 BPM    WA Interval 178 ms    QRS Duration 88 ms     ms    QTc 409 ms    P Axis 46 degrees    R AXIS 29 degrees    T Axis 6 degrees    Interpretation ECG Sinus rhythm  Normal ECG      CBC with platelets differential     Status: None    Narrative    The following orders were created for panel order CBC with platelets differential.  Procedure                               Abnormality         Status                     ---------                               -----------         ------                     CBC with platelets and d...[[id number]]                       Final result                 Please view results for these tests on the individual orders.   Urine Drugs of Abuse Screen     Status: None (In process)    Narrative    The following orders were created for panel order Urine Drugs of Abuse Screen.  Procedure                               Abnormality         Status                     ---------                               -----------         ------                     Drug abuse screen 1 urin...[[id number]]                      In process                   Please view results for these tests on the individual orders.          Results for orders placed or performed during the hospital encounter of 02/26/22   Basic metabolic panel     Status: Abnormal   Result Value Ref Range    Sodium 139 133 - 144 mmol/L    Potassium 4.1 3.4 - 5.3 mmol/L    Chloride 110 (H) 94 - 109 mmol/L    Carbon Dioxide (CO2) 24 20 - 32 mmol/L    Anion Gap 5 3 - 14 mmol/L    Urea Nitrogen 14 7 - 30 mg/dL    Creatinine 0.85 0.52 - 1.04 mg/dL    Calcium 9.3 8.5 - 10.1 mg/dL    Glucose 96 70 - 99 mg/dL    GFR Estimate >90 >60 mL/min/1.73m2   Lipase     Status: Normal   Result Value Ref Range    Lipase 188 73 - 393 U/L   CBC with platelets and differential     Status: None   Result Value Ref Range    WBC Count 9.7 4.0 - 11.0 10e3/uL    RBC Count 4.52 3.80 - 5.20 10e6/uL    Hemoglobin 12.7 11.7 - 15.7 g/dL    Hematocrit 37.9 35.0 - 47.0 %    MCV 84 78 - 100 fL    MCH 28.1 26.5 - 33.0 pg    MCHC 33.5 31.5 - 36.5 g/dL    RDW 12.7 10.0 - 15.0 %    Platelet Count 279 150 - 450 10e3/uL    % Neutrophils 60 %    % Lymphocytes 31 %    % Monocytes 6 %    % Eosinophils 2 %    % Basophils 1 %    % Immature Granulocytes 0 %    NRBCs per 100 WBC 0 <1 /100    Absolute Neutrophils 5.9 1.6 - 8.3 10e3/uL    Absolute Lymphocytes 3.0 0.8 - 5.3 10e3/uL    Absolute Monocytes 0.6 0.0 - 1.3 10e3/uL    Absolute Eosinophils 0.2 0.0 - 0.7 10e3/uL    Absolute Basophils 0.1 0.0 - 0.2 10e3/uL    Absolute Immature  Granulocytes 0.0 <=0.4 10e3/uL    Absolute NRBCs 0.0 10e3/uL   HCG qualitative urine     Status: Normal   Result Value Ref Range    hCG Urine Qualitative Negative Negative   EKG 12 lead     Status: None (Preliminary result)   Result Value Ref Range    Systolic Blood Pressure  mmHg    Diastolic Blood Pressure  mmHg    Ventricular Rate 73 BPM    Atrial Rate 73 BPM    OK Interval 178 ms    QRS Duration 88 ms     ms    QTc 409 ms    P Axis 46 degrees    R AXIS 29 degrees    T Axis 6 degrees    Interpretation ECG Sinus rhythm  Normal ECG      CBC with platelets differential     Status: None    Narrative    The following orders were created for panel order CBC with platelets differential.  Procedure                               Abnormality         Status                     ---------                               -----------         ------                     CBC with platelets and d...[[id number]]                      Final result                 Please view results for these tests on the individual orders.   Urine Drugs of Abuse Screen     Status: None (In process)    Narrative    The following orders were created for panel order Urine Drugs of Abuse Screen.  Procedure                               Abnormality         Status                     ---------                               -----------         ------                     Drug abuse screen 1 urin...[[id number]]                      In process                   Please view results for these tests on the individual orders.     Medications   ondansetron (ZOFRAN) injection 4 mg (4 mg Intravenous Given 2/26/22 1906)   LORazepam (ATIVAN) injection 1 mg (1 mg Intravenous Given 2/26/22 1909)        Assessments & Plan (with Medical Decision Making)   Sadaf Ross is a 21 year old female with a history BPD, intellectual disability, and frequent ED visits presenting who comes in with a year of abdominal pain.  She also notes anxiety as a result of her upcoming  EGD.  Differential today includes but limited to GERD, chronic abdominal pain, anxiety, gastritis, vomiting.  Patient was given Zofran and Ativan which did help with her symptoms.  Vital signs today were unremarkable.  EKG was reviewed and showed normal sinus rhythm without abnormalities.  Labs today were unremarkable as well.  Looking at previous records we have not found a definitive cause of the patient's abdominal pain in the past.  At this point she is feeling better and we will discharge her back to her residence.  She will follow-up Monday for an EGD which I think will be quite useful into further characterizing the patient's abdominal pain.  I have reviewed the nursing notes. I have reviewed the findings, diagnosis, plan and need for follow up with the patient.    New Prescriptions    No medications on file       Final diagnoses:   Anxiety   Chronic abdominal pain   Suicidal ideation     Amy Patel, MS4  --  Richie Sinha  [facility] EMERGENCY DEPARTMENT  2/26/2022      --    ED Attending Physician Attestation    I Richie Sinha DO, cared for this patient with the Medical Student. I performed, or re-performed, the physical exam and medical decision-making. I have verified the accuracy of all the medical student findings and documentation above, and have edited as necessary.    Summary of HPI, PE, ED Course   Patient is a 21 year old female evaluated in the emergency department for abdominal pain and anxiety. Exam notable for abdominal tenderness. ED course notable for improvement in symptoms with Zofran and Ativan. After the completion of care in the emergency department, the patient was discharged.    Critical Care & Procedures  Not applicable.    Medical Decision Making  The medical record was reviewed and interpreted.  Current labs reviewed and interpreted.  Previous labs reviewed and interpreted.      Richie Sinha DO  Emergency Medicine          Richie Sinha  DO [place]  02/26/22 2018

## 2022-02-28 ENCOUNTER — HOSPITAL ENCOUNTER (EMERGENCY)
Facility: CLINIC | Age: 22
Discharge: HOME | End: 2022-02-28
Attending: EMERGENCY MEDICINE | Admitting: EMERGENCY MEDICINE
Payer: MEDICARE

## 2022-02-28 VITALS
DIASTOLIC BLOOD PRESSURE: 80 MMHG | OXYGEN SATURATION: 99 % | TEMPERATURE: 98.4 F | SYSTOLIC BLOOD PRESSURE: 132 MMHG | RESPIRATION RATE: 18 BRPM | HEART RATE: 97 BPM

## 2022-02-28 DIAGNOSIS — F60.3 BORDERLINE PERSONALITY DISORDER (H): ICD-10-CM

## 2022-02-28 DIAGNOSIS — R11.0 NAUSEA: ICD-10-CM

## 2022-02-28 PROCEDURE — 99283 EMERGENCY DEPT VISIT LOW MDM: CPT | Performed by: EMERGENCY MEDICINE

## 2022-02-28 PROCEDURE — 90791 PSYCH DIAGNOSTIC EVALUATION: CPT | Performed by: EMERGENCY MEDICINE

## 2022-02-28 PROCEDURE — 99285 EMERGENCY DEPT VISIT HI MDM: CPT | Mod: 25 | Performed by: EMERGENCY MEDICINE

## 2022-02-28 RX ORDER — ONDANSETRON 4 MG/1
4 TABLET, ORALLY DISINTEGRATING ORAL ONCE
Status: DISCONTINUED | OUTPATIENT
Start: 2022-02-28 | End: 2022-02-28 | Stop reason: HOSPADM

## 2022-02-28 ASSESSMENT — ENCOUNTER SYMPTOMS
SORE THROAT: 0
DYSURIA: 0
VOMITING: 0
LIGHT-HEADEDNESS: 0
NAUSEA: 1
COUGH: 0
AGITATION: 0
SHORTNESS OF BREATH: 0
CHILLS: 0
FREQUENCY: 0
ABDOMINAL PAIN: 0
FEVER: 0
ADENOPATHY: 0
MYALGIAS: 0
EYE DISCHARGE: 0
DIARRHEA: 0
HEADACHES: 1
COLOR CHANGE: 0
WEAKNESS: 0
CONFUSION: 0

## 2022-02-28 NOTE — ED NOTES
Bed: ED05  Expected date: 2/28/22  Expected time: 3:52 PM  Means of arrival:   Comments:  [place] [age]yo F nauseated

## 2022-02-28 NOTE — ED PROVIDER NOTES
[facility] EMERGENCY DEPARTMENT ([facility])       2/28/22  History     Chief Complaint   Patient presents with     Headache     Nausea & Vomiting     The history is provided by the patient and medical records.   Headache  Associated symptoms: nausea    Associated symptoms: no abdominal pain, no congestion, no cough, no diarrhea, no fever, no myalgias, no sore throat, no vomiting and no weakness      Sadaf Ross is a 21 year old female with a past medical history significant for borderline personality disorder, bipolar affective disorder, depressive disorder, and developmental delay who presents to the Emergency Department for evaluation of nausea and headaches.  She had an appointment today in which she says they put a scope down her throat and did biopsies.  She reports some nausea since that time.  On review of her chart she has had nausea for quite some time with a negative work-up 2 days ago.  She also reports that she is in the mood to punch people.  She has chronic suicidal ideation but has not done anything to hurt herself today.  She has a bifrontal headache which is mild.  She has no neurologic deficits.  She has no fever or chills.  She has no abdominal pain.  She has no chest pain or shortness of breath.        Past Medical History  Past Medical History:   Diagnosis Date     ADHD (attention deficit hyperactivity disorder)      Bipolar 1 disorder (H)      Borderline personality disorder (H)      Depression      Depressive disorder      Intellectual disability      Obesity      Syncope      Past Surgical History:   Procedure Laterality Date     APPENDECTOMY       APPENDECTOMY       ARIPiprazole ER (ABILIFY MAINTENA) 400 MG extended release inj syringe  benztropine (COGENTIN) 0.5 MG tablet  calcium carbonate (TUMS) 500 MG chewable tablet  chlorhexidine (CHLORHEXIDINE) 0.12 % solution  docusate sodium (COLACE) 100 MG capsule  famotidine (PEPCID) 10 MG tablet  hydrOXYzine (ATARAX) 50 MG  tablet  metoclopramide (REGLAN) 10 MG tablet  norgestimate-ethinyl estradiol (SPRINTEC 28) 0.25-35 MG-MCG tablet  OLANZapine (ZYPREXA) 2.5 MG tablet  OLANZapine zydis (ZYPREXA) 5 MG ODT  omeprazole (PRILOSEC) 40 MG DR capsule  ondansetron (ZOFRAN) 4 MG tablet  polyethylene glycol (MIRALAX) 17 g packet  prochlorperazine (COMPAZINE) 10 MG tablet  promethazine (PHENERGAN) 25 MG suppository  Vitamin D, Cholecalciferol, 25 MCG (1000 UT) TABS  venlafaxine (EFFEXOR-XR) 150 MG 24 hr capsule      Allergies   Allergen Reactions     Penicillins Rash and Unknown     Family History  Family History   Problem Relation Age of Onset     Diabetes Type 1 Father      Cancer Paternal Grandfather      Social History   Social History     Tobacco Use     Smoking status: Current Some Day Smoker     Packs/day: 1.00     Years: 5.00     Pack years: 5.00     Types: Cigarettes     Smokeless tobacco: Never Used   Substance Use Topics     Alcohol use: No     Drug use: No      Past medical history, past surgical history, medications, allergies, family history, and social history were reviewed with the patient. No additional pertinent items.     I have reviewed the Medications, Allergies, Past Medical and Surgical History, and Social History in the Epic system.    Review of Systems   Constitutional: Negative for chills and fever.   HENT: Negative for congestion and sore throat.    Eyes: Negative for discharge.   Respiratory: Negative for cough and shortness of breath.    Cardiovascular: Negative for chest pain and leg swelling.   Gastrointestinal: Positive for nausea. Negative for abdominal pain, diarrhea and vomiting.   Genitourinary: Negative for dysuria and frequency.   Musculoskeletal: Negative for myalgias.   Skin: Negative for color change and rash.   Neurological: Positive for headaches. Negative for weakness and light-headedness.   Hematological: Negative for adenopathy.   Psychiatric/Behavioral: Positive for behavioral problems. Negative  for agitation and confusion.       Physical Exam   BP: 132/80  Pulse: 97  Temp: 98.4  F (36.9  C)  Resp: 18  SpO2: 99 %      Physical Exam  Vitals and nursing note reviewed.   Constitutional:       General: She is not in acute distress.     Appearance: She is well-developed.   HENT:      Head: Normocephalic and atraumatic.   Eyes:      Conjunctiva/sclera: Conjunctivae normal.      Pupils: Pupils are equal, round, and reactive to light.   Neck:      Thyroid: No thyromegaly.      Trachea: No tracheal deviation.   Cardiovascular:      Rate and Rhythm: Normal rate and regular rhythm.      Heart sounds: Normal heart sounds. No murmur heard.  Pulmonary:      Effort: Pulmonary effort is normal. No respiratory distress.      Breath sounds: Normal breath sounds. No wheezing.   Chest:      Chest wall: No tenderness.   Abdominal:      General: There is no distension.      Palpations: Abdomen is soft.      Tenderness: There is no abdominal tenderness.   Musculoskeletal:         General: No tenderness.      Cervical back: Normal range of motion and neck supple.   Skin:     General: Skin is warm.      Findings: No rash.   Neurological:      Mental Status: She is alert and oriented to person, place, and time.      Sensory: No sensory deficit.   Psychiatric:         Behavior: Behavior normal.         ED Course     Procedures          No results found for this or any previous visit (from the past 24 hour(s)).  Medications   ondansetron (ZOFRAN-ODT) ODT tab 4 mg (has no administration in time range)        Assessments & Plan (with Medical Decision Making)   Patient is a 21-year-old female who is well-known to this emergency department with frequent visits for suicidal ideation.  She presents today with nausea and headaches.  It sounds like she had recent endoscopy with biopsy today.  She has no fever.  She has no trauma.  She has not done anything to harm herself.    Patient's vital signs are normal.  Her examination is benign.  She  had a previous work-up 2 days ago which was negative for similar symptoms.    A DEC assessment was performed.  These complaints and her current behavior are fairly typical for her.  We were able to contract for her safety.  Group home is willing to take her back.  They can keep her environment safe.  Patient will return if she has increased thoughts of harming yourself or actual actions of self-harm.    I have reviewed the nursing notes.    I have reviewed the findings, diagnosis, plan and need for follow up with the patient.    New Prescriptions    No medications on file       Final diagnoses:   Borderline personality disorder (H)   Nausea       I, Urbano Talamantes am serving as a trained medical scribe to document services personally performed by Leo Pack MD, based on the provider's statements to me.      I, Leo Pack MD, was physically present and have reviewed and verified the accuracy of this note documented by Urbano Talamantes.     Leo Pack MD  2/28/2022   [facility] EMERGENCY DEPARTMENT     Leo Pack MD  02/28/22 2000

## 2022-02-28 NOTE — DISCHARGE INSTRUCTIONS
DEC Aftercare Plan  If I am feeling unsafe or I am in a crisis, I will:   Contact my established care providers   Call the National Suicide Prevention Lifeline: 396.827.8400   Go to the nearest emergency room   Call 911     Warning signs that I or other people might notice when a crisis is developing for me:   Suicidal thoughts  Having a difficult time asking for help or sharing with others how I am feeling    Things I am able to do on my own to cope or help me feel better:   Listen to music  Utilize distraction methods (coloring, drawing, playing a game, deep breathing...)     Things that I am able to do with others to cope or help me feel better:   Ask for help as needed  Share thoughts and feelings with trusted individual     People in my life that I can ask for help:   Therapist  On call therapist   [facility] crisis workers  [facility]  Group home staff      Your [facility] has a mental health crisis team you can call 24/7: [facility] Mobile Crisis  [phone] (adults)  [phone] (children)    Other things that are important when I m in crisis:   Asking group home staff for help as needed  Utilizing coping skills and taking time to decrease intensity of emotions before making decisions     Additional resources and information:   1-  Alternatives to self harm  Snap a rubber band around your wrist. Find a thick rubber band and put it around your wrist. When you feel the need to cut snap the rubber band until the urge subsides.     Draw on yourself with sharpie/draw pretend wounds where you want to cut. It s a physiological thing. Your mind sees red where you want to cut, and sometimes the urge goes away. Or, instead of cutting you can buy brand new sharpies with semi sharp edges and draw on the undersides of your arms. It can also be fun and artistic.     Take a hot or cold shower. Not luke warm - a temperature different than your body will mimick the  distraction  purposes of self harm.      [employer]  yourself: take a plastic tipped hair brush and instead of scratching, brush the areas you would normally self harm. Apply pressure but do not break the skin.      Ice yourself: Run an ice cube down wherever you self harm. Do it until it melts away completely. It s a sub for those urges and feelings.      When you want to cut, go outside and distract yourself, or  a hobby. Exercise is a natural way of releasing endorphins, a similar reaction to harming oneself. Good hobbies to  are painting, or writing in a journal. You don t have to be good at it; nobody will be critiquing your work. It s a healthy way to express emotion, which is highly beneficial. Sit and draw, or read a book.      Blast Music: Headphones in, world out. Just get some music, somewhat cheerful preferably, and tune out the world. Embrace music, and embrace yourself. This is another way stop racing thoughts/worry.     For every cut you have, you have to wait one day to cut again. Let s say you ve cut 5 times. 5 cuts. Wait 5 days to cut again. See if you can do it. It s an alternative to driving yourself crazy by trying to stop cold turkey. Because, you re not stopping completely. This is also a pact you can make with someone who desires you to stop SI. Or for burns/bruises etc, you can wait until it heals to  X  point before doing it again.       Practice Mindfulness (Meditate): Put on some light music or find a quiet place and try to clear your mind. Take deep breaths and try to control your heart beat. Try to observe everything that is going on around you in the moment.  If you re going to cry, then let the tears flow. This is your personal time to relax and do whatever you want.          Crisis Lines  Crisis Text Line  Text 457042  You will be connected with a trained live crisis counselor to provide support.    Por major, texto  SIRENA a 330219 o texto a 442-AYUDAME en [facility] Hope Line  1.800.SUICIDE  "[4401412]      Community Resources  Fast Tracker  Linking people to mental health and substance use disorder resources  fastOptimatackFotoshkolan.org     [place] [facility] Warm Line  Peer to peer support  Monday thru Saturday, 12 pm to 10 pm  732.634.1245 or 4.878.154.4542  Text \"Support\" to 91787    [employer] [place] [employer] Mental [employer]ness (MAGY)  995.172.7977 or 1.888.MAGY.HELPS        Mental Health Apps  My3  https://my3app.org/    VirtualHopeBox  https://[employer].org/apps/virtual-hope-box/  "

## 2022-02-28 NOTE — ED NOTES
Pt refusing to stay in room 5, insisted on being switch to sit in hallway. Pt now moved to hallway and has a 1:1 sitter with her.

## 2022-02-28 NOTE — ED NOTES
Pt takes pepcid 10mg, cogentin 0.5mg, colace 100mg, atarax 50mg, sprintec 28, zyprexa 5mg and effexor XR 150mg in evening/bedtime.

## 2022-02-28 NOTE — ED PROVIDER NOTES
[facility] EMERGENCY DEPARTMENT ([facility])    2/27/22          History     Chief Complaint   Patient presents with     Suicidal     Patient has a doctor's appointment tomorrow that is causing her great deal of anxiety. Reports wanting to kill herself because of it and also wanting to punch others.      HPI  Sadaf Ross is a 21 year old female with a past medical history significant for bipolar 1 disorder, borderline personality disorder, depression, intellectual disability who presents to the ED for evaluation of suicidal ideation.  Patient reports that she has a doctor's appointment tomorrow that is causing her a great deal of anxiety and wants to kill herself because of it and wants to punch others.    Per [facility] , the patient was feeling suicidal earlier today and wanted to come to the ED.  She has history of depression and intellectual disability.  In addition, the anniversary of her father's death is near which is causing her additional stress.  Patient was in the ED yesterday and got home at 2-3 AM today.  She reports increased SI in the past couple of days.  Denies hallucinations or SI to the DEC .  She also has a care team that is getting together to discuss her frequent ED visits and using the ED as a coping method.  The patient is a smoker and drinks soda at night.  According to group home staff there have been no big changes in medications.  She is able to use music as a distraction method.  Overall she is here because of increased stress.  However on assessment she feels that her SI has resolved and wants to go home and sleep in her own bed. See DEC assessment in epic for further details.    Past Medical History  Past Medical History:   Diagnosis Date     ADHD (attention deficit hyperactivity disorder)      Bipolar 1 disorder (H)      Borderline personality disorder (H)      Depression      Depressive disorder      Intellectual disability      Obesity      Syncope       Past Surgical History:   Procedure Laterality Date     APPENDECTOMY       APPENDECTOMY       ARIPiprazole ER (ABILIFY MAINTENA) 400 MG extended release inj syringe  benztropine (COGENTIN) 0.5 MG tablet  calcium carbonate (TUMS) 500 MG chewable tablet  chlorhexidine (CHLORHEXIDINE) 0.12 % solution  docusate sodium (COLACE) 100 MG capsule  famotidine (PEPCID) 10 MG tablet  hydrOXYzine (ATARAX) 50 MG tablet  metoclopramide (REGLAN) 10 MG tablet  norgestimate-ethinyl estradiol (SPRINTEC 28) 0.25-35 MG-MCG tablet  OLANZapine (ZYPREXA) 2.5 MG tablet  OLANZapine zydis (ZYPREXA) 5 MG ODT  omeprazole (PRILOSEC) 40 MG DR capsule  ondansetron (ZOFRAN) 4 MG tablet  polyethylene glycol (MIRALAX) 17 g packet  prochlorperazine (COMPAZINE) 10 MG tablet  promethazine (PHENERGAN) 25 MG suppository  venlafaxine (EFFEXOR-XR) 150 MG 24 hr capsule  Vitamin D, Cholecalciferol, 25 MCG (1000 UT) TABS      Allergies   Allergen Reactions     Penicillins Rash and Unknown     Family History  Family History   Problem Relation Age of Onset     Diabetes Type 1 Father      Cancer Paternal Grandfather      Social History   Social History     Tobacco Use     Smoking status: Current Some Day Smoker     Packs/day: 1.00     Years: 5.00     Pack years: 5.00     Types: Cigarettes     Smokeless tobacco: Never Used   Substance Use Topics     Alcohol use: No     Drug use: No      Past medical history, past surgical history, medications, allergies, family history, and social history were reviewed with the patient. No additional pertinent items.       Review of Systems   Psychiatric/Behavioral: Negative for hallucinations and suicidal ideas.   All other systems reviewed and are negative.    A complete review of systems was performed with pertinent positives and negatives noted in the HPI, and all other systems negative.    Physical Exam   BP: 128/85  Pulse: 83  Temp: 98.5  F (36.9  C)  Resp: 12  SpO2: 100 %  Physical Exam  Vitals and nursing note reviewed.    Constitutional:       Appearance: She is not ill-appearing.   HENT:      Head: Atraumatic.   Eyes:      Pupils: Pupils are equal, round, and reactive to light.   Pulmonary:      Effort: No respiratory distress.   Musculoskeletal:         General: No deformity.      Cervical back: Neck supple.   Neurological:      General: No focal deficit present.      Mental Status: She is alert.   Psychiatric:      Comments: Unusual         ED Course      Procedures         Medications   LORazepam (ATIVAN) tablet 2 mg (2 mg Oral Given 2/27/22 0533)     Patient was seen by behavioral medicine.     Assessments & Plan (with Medical Decision Making)     I have reviewed the nursing notes and discussed the case with behavioral medicine.  At this time they feel the patient does not meet criteria for emergency hospitalization and can go back to her group home.        Final diagnoses:   Borderline personality disorder (H)     IRere, am serving as a trained medical scribe to document services personally performed by Ayo Romero MD based on the provider's statements to me on February 27, 2022.  This document has been checked and approved by the attending provider.    IAyo MD, was physically present and have reviewed and verified the accuracy of this note documented by Rere Rea, medical scribe.      Ayo Romero MD      --  Ayo Romero Md  [facility] EMERGENCY DEPARTMENT  2/27/2022     Ayo Romero MD  02/27/22 0480

## 2022-02-28 NOTE — ED NOTES
"2/27/2022  Sadaf Ross 2000     [facility] Crisis Assessment    Patient was assessed: remote  Patient location: [facility] ED    Referral Data and Chief Complaint  Sadaf is a 21 year old who uses she/her pronouns. Patient presented to the ED via EMS and was referred to the ED by self. Patient is presenting to the ED for the following concerns: suicidal ideation and anxiety.      Informed Consent and Assessment Methods    Patient is her own guardian. Writer met with patient and explained the crisis assessment process, including applicable information disclosures and limits to confidentiality, assessed understanding of the process, and obtained consent to proceed with the assessment. Patient was observed to be able to participate in the assessment as evidenced by pt was alert and oriented during assessment. Assessment methods included conducting a formal interview with patient, review of medical records, collaboration with medical staff, and obtaining relevant collateral information from family and community providers when available.    Narrative Summary of Presenting Problem and Current Functioning  What led to the patient presenting for crisis services, factors that make the crisis life threatening or complex, stressors, how is this disrupting the patient's life, and how current functioning is in comparison to baseline. How is patient presenting during the assessment.     Pt reports she came by ambulance from her group home due to having suicidal ideation and anxiety about a medical procedure scheduled tomorrow.  Pt has baseline behavior of calling 911 and coming to the ED for emotional and behavioral distress. Pt was calm and cooperative during assessment. Pt was quiet and closed off during assessment providing short answers and multiple times simply asking to \"go back home\".    History of the Crisis  Duration of the current crisis, coping skills attempted to reduce the crisis, community resources used, and " past presentations.    Pt was seen in the ED on 2/26/2022 for abdominal pains and discharged back to her group home earlier today around 2 am per group home staffs report. Pt has medical procedure scheduled tomorrow to help diagnose any causes of long term stomach concerns. Pt has lengthy records of multiple ED visits and utilizing 911, EMS Staff and ED staff as coping strategies to emotional distress. Pt has outpatient team, group home staff and reports a sister who is supportive of her.     Collateral Information  Group home staff at #[phone]. Reported pt ate breakfast today and was complaining about stomach pain an her medical procedure tomorrow. Staff reported later in the after pt was making suicidal statements with no reported plan. Pt has not acted on suicidal threats per staffs report. Staff reported group home staff and pts outpatient care team will be meeting this week to discuss pts ongoing use of 911, EMS And ED services. Pt is her own legal guardian currently per staffs report and it is a goal of pt to not be assigned a guardian.     Risk Assessment    Risk of Harm to Self     ESS-6  1.a. Over the past 2 weeks, have you had thoughts of killing yourself? Yes  1.b. Have you ever attempted to kill yourself and, if yes, when did this last happen? No   2. Recent or current suicide plan? No   3. Recent or current intent to act on ideation? No  4. Lifetime psychiatric hospitalization? Yes  5. Pattern of excessive substance use? No  6. Current irritability, agitation, or aggression? Yes  Scoring note: BOTH 1a and 1b must be yes for it to score 1 point, if both are not yes it is zero. All others are 1 point per number. If all questions 1a/1b - 6 are no, risk is negligible. If one of 1a/1b is yes, then risk is mild. If either question 2 or 3, but not both, is yes, then risk is automatically moderate regardless of total score. If both 2 and 3 are yes, risk is automatically high regardless of total score.      Score: 2, mild risk    The patient has the following risk factors for suicide: depressive symptoms, health stressors, poor impulse control, preoccupied with death/dying and restless/agitated    Is the patient experiencing current suicidal ideation: Yes. Thoughts to kill self with no plan or intent    Is the patient engaging in preparatory suicide behaviors (formulating how to act on plan, giving away possessions, saying goodbye, displaying dramatic behavior changes, etc)? No    Does the patient have access to firearms or other lethal means? no    The patient has the following protective factors: established relationship community mental health provider(s) and safe/stable housing    Support system information: group home staff and outpatient care team    Patient strengths: able to identify coping skills however struggles to utilize them when needed    Does the patient engage in non-suicidal self-injurious behavior (NSSI/SIB)? no    Is the patient vulnerable to sexual exploitation?  No    Is the patient experiencing abuse or neglect? no    Is the patient a vulnerable adult? No      Risk of Harm to Others  The patient has the following risk factors of harm to others: no risk factors identified    Does the patient have thoughts of harming others? No    Is the patient engaging in sexually inappropriate behavior?  no       Current Substance Abuse    Is there recent substance abuse? pt reports smoking cigarettes daily; amounts uknown and reports drinking soda daily at bedtime    Was a urine drug screen or blood alcohol level obtained: No          Current Symptoms/Concerns    Symptoms  Attention, hyperactivity, and impulsivity symptoms present: No    Anxiety symptoms present: Yes: Generalized Symptoms: Agitation, Avoidance and Cognitive anxiety - feelings of doom, racing thoughts, difficulty concentrating       Appetite symptoms present: Yes: Loss of Appetite     Behavioral difficulties present: Yes: Agitation and  Impulsivity/Disinhibition     Cognitive impairment symptoms present: No    Depressive symptoms present: Yes Appetite change/weight change , Crying or feels like crying, Depressed mood, Impaired decision making , Increased irritability/agitation and Thoughts of suicide/death      Eating disorder symptoms present: No    Learning disabilities, cognitive challenges, and/or developmental disorder symptoms present: Yes: Communication, Mood and Self-Regulation     Manic/hypomanic symptoms present: No    Personality and interpersonal functioning difficulties present : Yes: Cognitive Distortions, Displaces Blame, Emotional Deregulation, Impaired Impulse Control and Impaired Interpersonal Functioning    Psychosis symptoms present: No      Sleep difficulties present: Yes: Difficulty falling asleep  and Difficulty staying sleep     Substance abuse disorder symptoms present: Yes Cravings or strong desire to use and Substance abuse is continued despite knowledge of having a persistent or recurrent physical or psychological problem that has been caused of exacerbated by substance use     Trauma and stressor related symptoms present: No           Mental Status Exam   Affect: Dramatic   Appearance: Disheveled    Attention Span/Concentration: Attentive?    Eye Contact: Variable   Fund of Knowledge: Appropriate    Language /Speech Content: Fluent   Language /Speech Volume: Normal    Language /Speech Rate/Productions: Normal    Recent Memory: Intact   Remote Memory: Intact   Mood: Irritable    Orientation to Person: Yes    Orientation to Place: Yes   Orientation to Time of Day: Yes    Orientation to Date: Yes    Situation (Do they understand why they are here?): Yes    Psychomotor Behavior: Normal    Thought Content: Suicidal   Thought Form: Intact       Mental Health and Substance Abuse History    History  Current and historical diagnoses or mental health concerns: history of BPD, depression, intellectual disabilities and adjustment  disorder    Prior MH services (inpatient, programmatic care, outpatient, etc) : Yes history of inpatient admissions and multiple ED visits    Has the patient used [facility] crisis team services before?: No    History of substance abuse: No    Prior LIZZETTE services (inpatient, programmatic care, detox, outpatient, etc) : No    History of commitment: No    Family history of MH/LIZZETTE: No    Trauma history: No    Medication  Psychotropic medications: Yes. Pt is currently taking medications in EPIC. Medication compliant: Yes. Recent medication changes: No    Current Care Team    Primary Care Provider: Yes. Name: [facility]. Location: [place]. Date of last visit: unknown. Frequency: unknown. Perceived helpfulness: yes.     Psychiatrist: Yes. Name: Emma Jacobson. Location: [facility]. Date of last visit: unknown. Frequency: unknown. Perceived helpfulness: yes.     Therapist: Yes. Name: Shannen Martin. Location: [facility] ([phone]). Date of last visit: uknon. Frequency: weekly. Perceived helpfulness: helpful.     : Yes. Name: Jyoti Malik. Location: [facility] ([phone]). Date of last visit: unknown. Frequency: unknown. Perceived helpfulness: unknown.     CTSS or ARMHS: [facility]    Release of Information  Was a release of information signed: No. Reason: pt has recent ED visits with same providers listed      Biopsychosocial Information    Socioeconomic Information  Current living situation: group home    Employment/income source: unknown pt did not report    Relevant legal issues: none reported    Cultural, Sikhism, or spiritual influences on mental health care: none reported    Is the patient active in the  or a : No      Relevant Medical Concerns   Patient identifies concerns with completing ADLs? No     Patient can ambulate independently? Yes     Other medical concerns? Yes     History of concussion or TBI? No         Diagnosis  Borderline personality disorder F60.3  By history     Major depressive disorder, Recurrent episode, Mild F33.0  By history       Other intellectual disabilities F78    By history           Therapeutic Intervention  The following therapeutic methodologies were employed when working with the patient: establishing rapport, active listening, identifying additional supports and alternative coping skills, establishing a discharge plan, motivational interviewing and treatment planning. Patient response to intervention: pt was minimally engaged in assessment process and safety planning discussion.      Disposition  Recommended disposition: Individual Therapy, Medication Management and Group Home: pt will return to established group home      Reviewed case and recommendations with attending provider. Attending Name:       Attending concurs with disposition: Yes      Patient concurs with disposition: Yes      Guardian concurs with disposition: NA     Final disposition: Individual therapy , Medication management and Group home: pt will return to current group home .       Clinical Substantiation of Recommendations   Rationale with supporting factors for disposition and diagnosis.     Pt is 21 year old pt with history of  BPD, depression, intellectual disabilities and adjustment disorder. Pt was alert and oriented during assessment. Pt was quiet, withdrawn and tearful during assessment. Pt was minimally engaged with . Pt denied SI, HI and hallucinations. Pt reported symptoms of depression, anxiety and episodes of suicidal thoughts. Pt reported stress due to a scheduled medical procedure tomorrow. Pt has outpatient psychiatrist and therapist and previous records indicate on call therapist pt has access too. Pt lives at group home, has group home staff and other  in her life as well. Pt reported some concerns with sleep and appetite recently. Pt denied suicidal plans or intent with  " and reported multiple times she wanted to be done talking and \"go home\". Pt recommended to return to group home and outpatient care team.       Assessment Details  Patient interview started at: 9:37 PM and completed at: 9:51 PM.    Total duration spent on the patient case in minutes: 2.0 hrs     CPT code(s) utilized: 15781 - Psychotherapy for Crisis - 60 (30-74*) min       Aftercare and Safety Planning  Follow up plans with MH/LIZZETTE services: Yes pt will return to established outpatient providers      Aftercare plan placed in the AVS and provided to patient: Yes. Given to patient by ED staff     [facility]      Aftercare Plan  If I am feeling unsafe or I am in a crisis, I will:   Contact my established care providers   Call the National Suicide Prevention Lifeline: 729.984.7138   Go to the nearest emergency room   Call 910     Warning signs that I or other people might notice when a crisis is developing for me:   Suicidal thoughts  Having a difficult time asking for help or sharing with others how I am feeling    Things I am able to do on my own to cope or help me feel better:   Listen to music  Utilize distraction methods (coloring, drawing, playing a game, deep breathing...)     Things that I am able to do with others to cope or help me feel better:   Ask for help as needed  Share thoughts and feelings with trusted individual     People in my life that I can ask for help:   Therapist  On call therapist   [facility] crisis workers  Sister  Group home staff      Your [facility] has a mental health crisis team you can call 24/7: [facility] Mobile Crisis  [phone] (adults)  [phone] (children)    Other things that are important when I m in crisis:   Asking group home staff for help as needed  Utilizing coping skills and taking time to decrease intensity of emotions before making decisions     Additional resources and information:   1-  Alternatives to self harm  Snap a rubber band around your " wrist. Find a thick rubber band and put it around your wrist. When you feel the need to cut snap the rubber band until the urge subsides.     Draw on yourself with sharpie/draw pretend wounds where you want to cut. It s a physiological thing. Your mind sees red where you want to cut, and sometimes the urge goes away. Or, instead of cutting you can buy brand new sharpies with semi sharp edges and draw on the undersides of your arms. It can also be fun and artistic.     Take a hot or cold shower. Not luke warm - a temperature different than your body will mimick the  distraction  purposes of self harm.      Brush yourself: take a plastic tipped hair brush and instead of scratching, brush the areas you would normally self harm. Apply pressure but do not break the skin.      Ice yourself: Run an ice cube down wherever you self harm. Do it until it melts away completely. It s a sub for those urges and feelings.      When you want to cut, go outside and distract yourself, or  a hobby. Exercise is a natural way of releasing endorphins, a similar reaction to harming oneself. Good hobbies to  are painting, or writing in a journal. You don t have to be good at it; nobody will be critiquing your work. It s a healthy way to express emotion, which is highly beneficial. Sit and draw, or read a book.      Blast Music: Headphones in, world out. Just get some music, somewhat cheerful preferably, and tune out the world. Embrace music, and embrace yourself. This is another way stop racing thoughts/worry.     For every cut you have, you have to wait one day to cut again. Let s say you ve cut 5 times. 5 cuts. Wait 5 days to cut again. See if you can do it. It s an alternative to driving yourself crazy by trying to stop cold turkey. Because, you re not stopping completely. This is also a pact you can make with someone who desires you to stop SI. Or for burns/bruises etc, you can wait until it heals to  X  point before doing  "it again.       Practice Mindfulness (Meditate): Put on some light music or find a quiet place and try to clear your mind. Take deep breaths and try to control your heart beat. Try to observe everything that is going on around you in the moment.  If you re going to cry, then let the tears flow. This is your personal time to relax and do whatever you want.          Crisis Lines  Crisis Text Line  Text 241553  You will be connected with a trained live crisis counselor to provide support.    Por espanol, texto  SIRENA a 076553 o texto a 442-AYUDAME en WhatsApp    National Hope Line  1.800.SUICIDE [3962775]      Community Resources  Fast Tracker  Linking people to mental health and substance use disorder resources  Inmobiliarien.org     [place] Mental Health Warm Line  Peer to peer support  Monday thru Saturday, 12 pm to 10 pm  939.003.7595 or 7.583.869.2223  Text \"Support\" to 80773    National [place] [employer] Mental [employer]ness (MAGY)  168.229.5336 or 1.888.MAGY.HELPS        Mental Health Apps  My3  https://my3app.org/    VirtualHopeBox  https://PhaseRx.org/apps/virtual-hope-box/            "

## 2022-02-28 NOTE — ED NOTES
Pt states that she had an abdominal biopsy today and was having abdominal pain and nausea when she got back to the group home today. EMS called, pt refused Zofran en route to ER and when she got here. Pt's focus has been her suicidal thoughts since being in the ER
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
## 2022-03-01 ENCOUNTER — HOSPITAL ENCOUNTER (EMERGENCY)
Facility: CLINIC | Age: 22
Discharge: HOME | End: 2022-03-01
Attending: EMERGENCY MEDICINE | Admitting: EMERGENCY MEDICINE
Payer: MEDICARE

## 2022-03-01 ENCOUNTER — APPOINTMENT (OUTPATIENT)
Dept: CT IMAGING | Facility: CLINIC | Age: 22
End: 2022-03-01
Attending: EMERGENCY MEDICINE
Payer: MEDICARE

## 2022-03-01 VITALS
TEMPERATURE: 98.1 F | RESPIRATION RATE: 16 BRPM | BODY MASS INDEX: 39.82 KG/M2 | DIASTOLIC BLOOD PRESSURE: 74 MMHG | HEART RATE: 91 BPM | WEIGHT: 232 LBS | OXYGEN SATURATION: 98 % | SYSTOLIC BLOOD PRESSURE: 141 MMHG

## 2022-03-01 DIAGNOSIS — R10.13 ABDOMINAL PAIN, EPIGASTRIC: ICD-10-CM

## 2022-03-01 LAB
ALBUMIN SERPL-MCNC: 4.1 G/DL (ref 3.4–5)
ALP SERPL-CCNC: 52 U/L (ref 40–150)
ALT SERPL W P-5'-P-CCNC: 46 U/L (ref 0–50)
ANION GAP SERPL CALCULATED.3IONS-SCNC: 7 MMOL/L (ref 3–14)
AST SERPL W P-5'-P-CCNC: 24 U/L (ref 0–45)
BASOPHILS # BLD AUTO: 0.1 10E3/UL (ref 0–0.2)
BASOPHILS NFR BLD AUTO: 1 %
BILIRUB SERPL-MCNC: 0.4 MG/DL (ref 0.2–1.3)
BUN SERPL-MCNC: 11 MG/DL (ref 7–30)
CALCIUM SERPL-MCNC: 9.4 MG/DL (ref 8.5–10.1)
CHLORIDE BLD-SCNC: 110 MMOL/L (ref 94–109)
CO2 SERPL-SCNC: 24 MMOL/L (ref 20–32)
CREAT SERPL-MCNC: 0.92 MG/DL (ref 0.52–1.04)
EOSINOPHIL # BLD AUTO: 0.2 10E3/UL (ref 0–0.7)
EOSINOPHIL NFR BLD AUTO: 3 %
ERYTHROCYTE [DISTWIDTH] IN BLOOD BY AUTOMATED COUNT: 12.9 % (ref 10–15)
GFR SERPL CREATININE-BSD FRML MDRD: 90 ML/MIN/1.73M2
GLUCOSE BLD-MCNC: 124 MG/DL (ref 70–99)
HCG SERPL QL: NEGATIVE
HCT VFR BLD AUTO: 40 % (ref 35–47)
HGB BLD-MCNC: 13.5 G/DL (ref 11.7–15.7)
IMM GRANULOCYTES # BLD: 0 10E3/UL
IMM GRANULOCYTES NFR BLD: 1 %
LIPASE SERPL-CCNC: 206 U/L (ref 73–393)
LYMPHOCYTES # BLD AUTO: 2.7 10E3/UL (ref 0.8–5.3)
LYMPHOCYTES NFR BLD AUTO: 37 %
MCH RBC QN AUTO: 28.4 PG (ref 26.5–33)
MCHC RBC AUTO-ENTMCNC: 33.8 G/DL (ref 31.5–36.5)
MCV RBC AUTO: 84 FL (ref 78–100)
MONOCYTES # BLD AUTO: 0.4 10E3/UL (ref 0–1.3)
MONOCYTES NFR BLD AUTO: 5 %
NEUTROPHILS # BLD AUTO: 3.9 10E3/UL (ref 1.6–8.3)
NEUTROPHILS NFR BLD AUTO: 53 %
NRBC # BLD AUTO: 0 10E3/UL
NRBC BLD AUTO-RTO: 0 /100
PLATELET # BLD AUTO: 252 10E3/UL (ref 150–450)
POTASSIUM BLD-SCNC: 3.8 MMOL/L (ref 3.4–5.3)
PROT SERPL-MCNC: 7.8 G/DL (ref 6.8–8.8)
RBC # BLD AUTO: 4.75 10E6/UL (ref 3.8–5.2)
SODIUM SERPL-SCNC: 141 MMOL/L (ref 133–144)
WBC # BLD AUTO: 7.2 10E3/UL (ref 4–11)

## 2022-03-01 PROCEDURE — 99284 EMERGENCY DEPT VISIT MOD MDM: CPT | Performed by: EMERGENCY MEDICINE

## 2022-03-01 PROCEDURE — 85004 AUTOMATED DIFF WBC COUNT: CPT | Performed by: EMERGENCY MEDICINE

## 2022-03-01 PROCEDURE — 84132 ASSAY OF SERUM POTASSIUM: CPT | Performed by: EMERGENCY MEDICINE

## 2022-03-01 PROCEDURE — 99285 EMERGENCY DEPT VISIT HI MDM: CPT | Mod: 25 | Performed by: EMERGENCY MEDICINE

## 2022-03-01 PROCEDURE — 250N000009 HC RX 250: Performed by: EMERGENCY MEDICINE

## 2022-03-01 PROCEDURE — 36415 COLL VENOUS BLD VENIPUNCTURE: CPT | Performed by: EMERGENCY MEDICINE

## 2022-03-01 PROCEDURE — 83690 ASSAY OF LIPASE: CPT | Performed by: EMERGENCY MEDICINE

## 2022-03-01 PROCEDURE — 84703 CHORIONIC GONADOTROPIN ASSAY: CPT | Performed by: EMERGENCY MEDICINE

## 2022-03-01 PROCEDURE — 74177 CT ABD & PELVIS W/CONTRAST: CPT

## 2022-03-01 PROCEDURE — 250N000011 HC RX IP 250 OP 636: Performed by: EMERGENCY MEDICINE

## 2022-03-01 RX ORDER — IOPAMIDOL 755 MG/ML
100 INJECTION, SOLUTION INTRAVASCULAR ONCE
Status: COMPLETED | OUTPATIENT
Start: 2022-03-01 | End: 2022-03-01

## 2022-03-01 RX ADMIN — SODIUM CHLORIDE 68 ML: 9 INJECTION, SOLUTION INTRAVENOUS at 11:37

## 2022-03-01 RX ADMIN — IOPAMIDOL 114 ML: 755 INJECTION, SOLUTION INTRAVENOUS at 11:36

## 2022-03-01 NOTE — DISCHARGE INSTRUCTIONS
Follow-up with your outpatient providers.    Blood work showed no evidence of liver or pancreas problems, CT scan showed no evidence to suggest infection, perforation or other acute process.    Return to the emergency department for any problems.

## 2022-03-01 NOTE — ED NOTES
"Writer spoke to Arlene, who is a staff member at the group [facility]. She states pt is able to come back \"anytime, there is 24 hour staff there\". Writer told her that this writer would set up transportation for pt to go back to .   "

## 2022-03-01 NOTE — DISCHARGE INSTRUCTIONS
Aftercare Plan  If I am feeling unsafe or I am in a crisis, I will:   Contact my established care providers   Call the National Suicide Prevention Lifeline: 906.777.4089   Go to the nearest emergency room   Call 919      Warning signs that I or other people might notice when a crisis is developing for me:   Suicidal thoughts  Having a difficult time asking for help or sharing with others how I am feeling     Things I am able to do on my own to cope or help me feel better:   Listen to music  Utilize distraction methods (coloring, drawing, playing a game, deep breathing...)      Things that I am able to do with others to cope or help me feel better:   Ask for help as needed  Share thoughts and feelings with trusted individual      People in my life that I can ask for help:   Therapist  On call therapist   [facility] crisis workers  [facility]  Group home staff       Your [facility] has a mental health crisis team you can call 24/7: [facility] Mobile Crisis  [phone] (adults)  [phone] (children)     Other things that are important when I m in crisis:   Asking group home staff for help as needed  Utilizing coping skills and taking time to decrease intensity of emotions before making decisions      Additional resources and information:   1-  Alternatives to self harm  Snap a rubber band around your wrist. Find a thick rubber band and put it around your wrist. When you feel the need to cut snap the rubber band until the urge subsides.      Draw on yourself with sharpie/draw pretend wounds where you want to cut. It s a physiological thing. Your mind sees red where you want to cut, and sometimes the urge goes away. Or, instead of cutting you can buy brand new sharpies with semi sharp edges and draw on the undersides of your arms. It can also be fun and artistic.      Take a hot or cold shower. Not luke warm - a temperature different than your body will mimick the  distraction  purposes of self harm.       Allamuchy  yourself: take a plastic tipped hair brush and instead of scratching, brush the areas you would normally self harm. Apply pressure but do not break the skin.       Ice yourself: Run an ice cube down wherever you self harm. Do it until it melts away completely. It s a sub for those urges and feelings.       When you want to cut, go outside and distract yourself, or  a hobby. Exercise is a natural way of releasing endorphins, a similar reaction to harming oneself. Good hobbies to  are painting, or writing in a journal. You don t have to be good at it; nobody will be critiquing your work. It s a healthy way to express emotion, which is highly beneficial. Sit and draw, or read a book.       Blast Music: Headphones in, world out. Just get some music, somewhat cheerful preferably, and tune out the world. Embrace music, and embrace yourself. This is another way stop racing thoughts/worry.      For every cut you have, you have to wait one day to cut again. Let s say you ve cut 5 times. 5 cuts. Wait 5 days to cut again. See if you can do it. It s an alternative to driving yourself crazy by trying to stop cold turkey. Because, you re not stopping completely. This is also a pact you can make with someone who desires you to stop SI. Or for burns/bruises etc, you can wait until it heals to  X  point before doing it again.        Practice Mindfulness (Meditate): Put on some light music or find a quiet place and try to clear your mind. Take deep breaths and try to control your heart beat. Try to observe everything that is going on around you in the moment.  If you re going to cry, then let the tears flow. This is your personal time to relax and do whatever you want.      Crisis Lines  Crisis Text Line  Text 502742  You will be connected with a trained live crisis counselor to provide support.     Por major, texto  SIRENA a 165969 o texto a 442-AYUDAME en [facility]St. [facility] Hope Line  1.800.SUICIDE  "[0829491]        Community Resources  Fast Tracker  Linking people to mental health and substance use disorder resources  fastPearltreesckYouxiduon.org      [place] Mental [facility] Warm Line  Peer to peer support  Monday thru Saturday, 12 pm to 10 pm  048.380.0346 or 2.834.101.2016  Text \"Support\" to 89688     National [place] on Mental Illness (MAGY)  215.647.8162 or 1.888.MAGY.HELPS           Mental Health Apps  My3  https://my3app.org/     VirtualHopeBox  https://[email or web address]/apps/virtual-hope-box/             "

## 2022-03-01 NOTE — ED NOTES
Bed: ED02  Expected date: 3/1/22  Expected time: 10:15 AM  Means of arrival: Ambulance  Comments:  [address] 21F back pain after fall  Rm 2

## 2022-03-01 NOTE — ED NOTES
Call place to patients [facility] [phone] and spoke to Arlene , report given and informed then that patient has been cleared medically and will be sent back by ambulance.

## 2022-03-01 NOTE — ED PROVIDER NOTES
[facility] EMERGENCY DEPARTMENT ([facility])       3/01/22  History     Chief Complaint   Patient presents with     Back Pain     Fell backwards in bed, now back hurting     Abdominal Pain     had biopsy done yesterday , continues to have abdominal pain     Suicidal     Chronic SI with plan to cut self     HPI  Sadaf Ross is a 21 year old female with a past medical history significant for borderline personality disorder, bipolar affective disorder, depressive disorder, and developmental delay who presents to the Emergency Department for evaluation of back pain and abdominal pain.  Patient recently had an endoscopy and biopsy done yesterday she reports today that she felt dizzy, fell back on her bed and is now experiencing abdominal pain. She continues to have abdominal pain. No fevers. Normal bowel movements and no change in urination.        Past Medical History  Past Medical History:   Diagnosis Date     ADHD (attention deficit hyperactivity disorder)      Bipolar 1 disorder (H)      Borderline personality disorder (H)      Depression      Depressive disorder      Intellectual disability      Obesity      Syncope      Past Surgical History:   Procedure Laterality Date     APPENDECTOMY       APPENDECTOMY       ARIPiprazole ER (ABILIFY MAINTENA) 400 MG extended release inj syringe  benztropine (COGENTIN) 0.5 MG tablet  calcium carbonate (TUMS) 500 MG chewable tablet  chlorhexidine (CHLORHEXIDINE) 0.12 % solution  docusate sodium (COLACE) 100 MG capsule  famotidine (PEPCID) 10 MG tablet  hydrOXYzine (ATARAX) 50 MG tablet  metoclopramide (REGLAN) 10 MG tablet  norgestimate-ethinyl estradiol (SPRINTEC 28) 0.25-35 MG-MCG tablet  OLANZapine (ZYPREXA) 2.5 MG tablet  OLANZapine zydis (ZYPREXA) 5 MG ODT  omeprazole (PRILOSEC) 40 MG DR capsule  ondansetron (ZOFRAN) 4 MG tablet  polyethylene glycol (MIRALAX) 17 g packet  prochlorperazine (COMPAZINE) 10 MG tablet  promethazine (PHENERGAN) 25 MG  suppository  venlafaxine (EFFEXOR-XR) 150 MG 24 hr capsule  Vitamin D, Cholecalciferol, 25 MCG (1000 UT) TABS      Allergies   Allergen Reactions     Penicillins Rash and Unknown     Family History  Family History   Problem Relation Age of Onset     Diabetes Type 1 Father      Cancer Paternal Grandfather      Social History   Social History     Tobacco Use     Smoking status: Current Some Day Smoker     Packs/day: 1.00     Years: 5.00     Pack years: 5.00     Types: Cigarettes     Smokeless tobacco: Never Used   Substance Use Topics     Alcohol use: No     Drug use: No      Past medical history, past surgical history, medications, allergies, family history, and social history were reviewed with the patient. No additional pertinent items.     I have reviewed the Medications, Allergies, Past Medical and Surgical History, and Social History in the Epic system.    Review of Systems  A complete review of systems was performed with pertinent positives and negatives noted in the HPI, and all other systems negative.    Physical Exam   BP: 131/72  Pulse: 91  Temp: 98.1  F (36.7  C)  Resp: 16  Weight: 105.2 kg (232 lb)  SpO2: 100 %      Physical Exam  Vitals and nursing note reviewed.   Constitutional:       General: She is not in acute distress.     Appearance: She is well-developed. She is not ill-appearing, toxic-appearing or diaphoretic.   HENT:      Head: Normocephalic and atraumatic.      Mouth/Throat:      Lips: Pink.      Mouth: Mucous membranes are moist.      Pharynx: Oropharynx is clear. No oropharyngeal exudate.   Eyes:      General: Lids are normal. No scleral icterus.     Extraocular Movements: Extraocular movements intact.      Right eye: No nystagmus.      Left eye: No nystagmus.      Conjunctiva/sclera: Conjunctivae normal.      Pupils: Pupils are equal, round, and reactive to light.   Neck:      Thyroid: No thyromegaly.      Vascular: No JVD.      Trachea: No tracheal deviation.   Cardiovascular:      Rate  and Rhythm: Normal rate and regular rhythm.      Pulses: Normal pulses.      Heart sounds: Normal heart sounds. No murmur heard.    No friction rub. No gallop.   Pulmonary:      Effort: Pulmonary effort is normal. No respiratory distress.      Breath sounds: Normal breath sounds.   Abdominal:      General: Bowel sounds are normal. There is no distension.      Palpations: Abdomen is soft. There is no mass.      Tenderness: There is abdominal tenderness ( Moderate tenderness to palpation in the epigastric region without peritoneal signs.) in the epigastric area. There is no guarding or rebound.   Musculoskeletal:         General: No tenderness. Normal range of motion.      Cervical back: Normal range of motion and neck supple. No erythema or rigidity.      Right lower leg: No edema.      Left lower leg: No edema.   Lymphadenopathy:      Cervical: No cervical adenopathy.   Skin:     General: Skin is warm and dry.      Capillary Refill: Capillary refill takes less than 2 seconds.      Coloration: Skin is not pale.      Findings: No erythema or rash.   Neurological:      Mental Status: She is alert and oriented to person, place, and time.      Cranial Nerves: No cranial nerve deficit.      Sensory: No sensory deficit.      Motor: Motor function is intact.   Psychiatric:         Mood and Affect: Mood and affect normal.         Speech: Speech normal.         Behavior: Behavior normal.         ED Course     At 10:35 AM the patient was seen and examined by Clifford Shah MD in Room ED 2.        Procedures                Results for orders placed or performed during the hospital encounter of 03/01/22 (from the past 24 hour(s))   CBC with platelets differential    Narrative    The following orders were created for panel order CBC with platelets differential.  Procedure                               Abnormality         Status                     ---------                               -----------         ------                      CBC with platelets and d...[[id number]]                      Final result                 Please view results for these tests on the individual orders.   Comprehensive metabolic panel   Result Value Ref Range    Sodium 141 133 - 144 mmol/L    Potassium 3.8 3.4 - 5.3 mmol/L    Chloride 110 (H) 94 - 109 mmol/L    Carbon Dioxide (CO2) 24 20 - 32 mmol/L    Anion Gap 7 3 - 14 mmol/L    Urea Nitrogen 11 7 - 30 mg/dL    Creatinine 0.92 0.52 - 1.04 mg/dL    Calcium 9.4 8.5 - 10.1 mg/dL    Glucose 124 (H) 70 - 99 mg/dL    Alkaline Phosphatase 52 40 - 150 U/L    AST 24 0 - 45 U/L    ALT 46 0 - 50 U/L    Protein Total 7.8 6.8 - 8.8 g/dL    Albumin 4.1 3.4 - 5.0 g/dL    Bilirubin Total 0.4 0.2 - 1.3 mg/dL    GFR Estimate 90 >60 mL/min/1.73m2   Lipase   Result Value Ref Range    Lipase 206 73 - 393 U/L   CBC with platelets and differential   Result Value Ref Range    WBC Count 7.2 4.0 - 11.0 10e3/uL    RBC Count 4.75 3.80 - 5.20 10e6/uL    Hemoglobin 13.5 11.7 - 15.7 g/dL    Hematocrit 40.0 35.0 - 47.0 %    MCV 84 78 - 100 fL    MCH 28.4 26.5 - 33.0 pg    MCHC 33.8 31.5 - 36.5 g/dL    RDW 12.9 10.0 - 15.0 %    Platelet Count 252 150 - 450 10e3/uL    % Neutrophils 53 %    % Lymphocytes 37 %    % Monocytes 5 %    % Eosinophils 3 %    % Basophils 1 %    % Immature Granulocytes 1 %    NRBCs per 100 WBC 0 <1 /100    Absolute Neutrophils 3.9 1.6 - 8.3 10e3/uL    Absolute Lymphocytes 2.7 0.8 - 5.3 10e3/uL    Absolute Monocytes 0.4 0.0 - 1.3 10e3/uL    Absolute Eosinophils 0.2 0.0 - 0.7 10e3/uL    Absolute Basophils 0.1 0.0 - 0.2 10e3/uL    Absolute Immature Granulocytes 0.0 <=0.4 10e3/uL    Absolute NRBCs 0.0 10e3/uL   HCG qualitative pregnancy (blood)   Result Value Ref Range    hCG Serum Qualitative Negative Negative   CT Abdomen Pelvis w Contrast    Narrative    CT ABDOMEN PELVIS WITH CONTRAST 3/1/2022 11:42 AM    CLINICAL HISTORY: Abdominal pain.    TECHNIQUE: CT scan of the abdomen and pelvis was performed  following  injection of IV contrast. Multiplanar reformats were obtained. Dose  reduction techniques were used.  CONTRAST: 114 mL Isovue 370    COMPARISON: 1/8/2022.    FINDINGS:   LOWER CHEST: Normal.    HEPATOBILIARY: Normal.    PANCREAS: Normal.    SPLEEN: Mild splenomegaly similar to prior.    ADRENAL GLANDS: Normal.    KIDNEYS/BLADDER: Normal.    BOWEL: Appendix has been removed.    PELVIC ORGANS: Normal.    ADDITIONAL FINDINGS: None.    MUSCULOSKELETAL: No worrisome bone lesion.      Impression    IMPRESSION: No acute cause for pain demonstrated.     RAYRAY VILLA MD         SYSTEM ID:  [id number]     Medications   iopamidol (ISOVUE-370) solution 100 mL (114 mLs Intravenous Given 3/1/22 1136)   sodium chloride 0.9 % bag 500mL for CT scan flush use (68 mLs As instructed Given 3/1/22 1137)             Assessments & Plan (with Medical Decision Making)     This patient presented to the emergency department complaining abdominal pain. She did have endoscopy with biopsy yesterday. She is afebrile, abdominal exam is without peritoneal signs but she does endorse tenderness to palpation in the epigastric region. No leukocytosis is noted, no evidence of acute hepatobiliary process or acute pancreatitis on blood work. CT was obtained to rule out perforation or other processes. This demonstrated no evidence of perforation or other acute process.  At this time, I am comfortable discharging her back to her group home and do not feel further work-up is necessary.    I have reviewed the nursing notes.    I have reviewed the findings, diagnosis, plan and need for follow up with the patient.    New Prescriptions    No medications on file       Final diagnoses:   Abdominal pain, epigastric       Jami MCGINNIS am serving as a trained medical scribe to document services personally performed by Clifford Shah MD, based on the provider's statements to me.      I, Clifford Shah MD, was physically present and  have reviewed and verified the accuracy of this note documented by Jami Arango.     Clifford Shah MD  3/1/2022   [facility] EMERGENCY DEPARTMENT     Clifford Shah MD  03/01/22 [id number]

## 2022-03-02 ENCOUNTER — HOSPITAL ENCOUNTER (EMERGENCY)
Facility: CLINIC | Age: 22
Discharge: HOME | End: 2022-03-02
Attending: PSYCHIATRY & NEUROLOGY | Admitting: PSYCHIATRY & NEUROLOGY
Payer: MEDICARE

## 2022-03-02 VITALS
RESPIRATION RATE: 18 BRPM | TEMPERATURE: 98.6 F | BODY MASS INDEX: 39.82 KG/M2 | DIASTOLIC BLOOD PRESSURE: 81 MMHG | HEIGHT: 64 IN | SYSTOLIC BLOOD PRESSURE: 123 MMHG | OXYGEN SATURATION: 99 % | HEART RATE: 78 BPM

## 2022-03-02 DIAGNOSIS — F79 INTELLECTUAL DISABILITY: ICD-10-CM

## 2022-03-02 DIAGNOSIS — F43.0 ACUTE REACTION TO STRESS: ICD-10-CM

## 2022-03-02 PROCEDURE — 250N000013 HC RX MED GY IP 250 OP 250 PS 637: Performed by: PSYCHIATRY & NEUROLOGY

## 2022-03-02 PROCEDURE — 99284 EMERGENCY DEPT VISIT MOD MDM: CPT | Performed by: PSYCHIATRY & NEUROLOGY

## 2022-03-02 PROCEDURE — 90791 PSYCH DIAGNOSTIC EVALUATION: CPT

## 2022-03-02 PROCEDURE — 99285 EMERGENCY DEPT VISIT HI MDM: CPT | Mod: 25 | Performed by: PSYCHIATRY & NEUROLOGY

## 2022-03-02 RX ORDER — OLANZAPINE 5 MG/1
5 TABLET, ORALLY DISINTEGRATING ORAL ONCE
Status: COMPLETED | OUTPATIENT
Start: 2022-03-02 | End: 2022-03-02

## 2022-03-02 RX ADMIN — OLANZAPINE 5 MG: 5 TABLET, ORALLY DISINTEGRATING ORAL at 20:43

## 2022-03-02 ASSESSMENT — ENCOUNTER SYMPTOMS
NERVOUS/ANXIOUS: 1
RESPIRATORY NEGATIVE: 1
MUSCULOSKELETAL NEGATIVE: 1
NEUROLOGICAL NEGATIVE: 1
CARDIOVASCULAR NEGATIVE: 1
GASTROINTESTINAL NEGATIVE: 1
EYES NEGATIVE: 1
CONSTITUTIONAL NEGATIVE: 1

## 2022-03-03 NOTE — ED NOTES
Bed: HW01  Expected date: 3/2/22  Expected time: 7:20 PM  Means of arrival: Ambulance  Comments:  [address] 20yo F SI

## 2022-03-03 NOTE — ED PROVIDER NOTES
ED Provider Note  [facility]      History     Chief Complaint   Patient presents with     Suicidal     HPI  Sadaf Ross is a 21 year old female who is here via EMS from her group home. She is well-known to us due to her previous visits. She has very poor coping skills, and easily regresses to seeing help in the ED when she feels suicidal or has thoughts of cutting. She has chronic SI which gets triggered when screened for any medical visit. This is her 24th ED visit since the start of this year. She had follow-up with [facility] this morning and saw a med student and was referred to APS due to her suicidal endorsement. She was then discharged home and had dinner. She then had called 911 as she was having intrusive thoughts to cut. She had taken her evening meds and now has started to feel tired and sedated. She is ready to return to her group home to sleep in her own bed. Patient appears at baseline. There is no acute psychosis.    Please see DEC Crisis Assessment on 3/2/22 in Epic for further details.    PERSONAL MEDICAL HISTORY  Past Medical History:   Diagnosis Date     ADHD (attention deficit hyperactivity disorder)      Bipolar 1 disorder (H)      Borderline personality disorder (H)      Depression      Depressive disorder      Intellectual disability      Obesity      Syncope      PAST SURGICAL HISTORY  Past Surgical History:   Procedure Laterality Date     APPENDECTOMY       APPENDECTOMY       FAMILY HISTORY  Family History   Problem Relation Age of Onset     Diabetes Type 1 Father      Cancer Paternal Grandfather      SOCIAL HISTORY  Social History     Tobacco Use     Smoking status: Current Every Day Smoker     Packs/day: 1.00     Years: 5.00     Pack years: 5.00     Types: Cigarettes     Smokeless tobacco: Never Used   Substance Use Topics     Alcohol use: No     MEDICATIONS  No current facility-administered medications for this encounter.     Current Outpatient Medications  "  Medication     ARIPiprazole ER (ABILIFY MAINTENA) 400 MG extended release inj syringe     benztropine (COGENTIN) 0.5 MG tablet     calcium carbonate (TUMS) 500 MG chewable tablet     chlorhexidine (CHLORHEXIDINE) 0.12 % solution     docusate sodium (COLACE) 100 MG capsule     famotidine (PEPCID) 10 MG tablet     hydrOXYzine (ATARAX) 50 MG tablet     metoclopramide (REGLAN) 10 MG tablet     norgestimate-ethinyl estradiol (SPRINTEC 28) 0.25-35 MG-MCG tablet     OLANZapine (ZYPREXA) 2.5 MG tablet     OLANZapine zydis (ZYPREXA) 5 MG ODT     omeprazole (PRILOSEC) 40 MG DR capsule     ondansetron (ZOFRAN) 4 MG tablet     polyethylene glycol (MIRALAX) 17 g packet     prochlorperazine (COMPAZINE) 10 MG tablet     promethazine (PHENERGAN) 25 MG suppository     venlafaxine (EFFEXOR-XR) 150 MG 24 hr capsule     Vitamin D, Cholecalciferol, 25 MCG (1000 UT) TABS     ALLERGIES  Allergies   Allergen Reactions     Penicillins Rash and Unknown          Review of Systems   Constitutional: Negative.    HENT: Negative.    Eyes: Negative.    Respiratory: Negative.    Cardiovascular: Negative.    Gastrointestinal: Negative.    Genitourinary: Negative.    Musculoskeletal: Negative.    Skin: Negative.    Neurological: Negative.    Psychiatric/Behavioral: Positive for suicidal ideas. The patient is nervous/anxious.    All other systems reviewed and are negative.        Physical Exam   BP: 139/88  Pulse: 107  Temp: 98  F (36.7  C)  Resp: 18  Height: 162.6 cm (5' 4\")  SpO2: 100 %  Physical Exam  Vitals and nursing note reviewed.   HENT:      Head: Normocephalic.   Eyes:      Pupils: Pupils are equal, round, and reactive to light.   Pulmonary:      Effort: Pulmonary effort is normal.   Musculoskeletal:         General: Normal range of motion.      Cervical back: Normal range of motion.   Neurological:      General: No focal deficit present.      Mental Status: She is alert.   Psychiatric:         Attention and Perception: Attention and " perception normal. She does not perceive auditory or visual hallucinations.         Mood and Affect: Mood and affect normal.         Speech: Speech normal.         Behavior: Behavior normal. Behavior is not agitated, aggressive, hyperactive or combative. Behavior is cooperative.         Thought Content: Thought content normal. Thought content is not paranoid or delusional. Thought content does not include homicidal or suicidal ideation.         Cognition and Memory: Cognition and memory normal.         Judgment: Judgment normal.         ED Course      Procedures         Mental Health Risk Assessment      PSS-3    Date and Time Over the past 2 weeks have you felt down, depressed, or hopeless? Over the past 2 weeks have you had thoughts of killing yourself? Have you ever attempted to kill yourself? When did this last happen? User   03/02/22 1924 yes yes yes within the last month (but not today) MQT      C-SSRS ([place])    Date and Time Q1 Wished to be Dead (Past Month) Q2 Suicidal Thoughts (Past Month) Q3 Suicidal Thought Method Q4 Suicidal Intent without Specific Plan Q5 Suicide Intent with Specific Plan Q6 Suicide Behavior (Lifetime) Within the Past 3 Months? RETIRED: Level of Risk per Screen Screening Not Complete User   03/02/22 1924 yes yes yes -- yes yes -- -- -- MQT              Suicide assessment completed by mental health (D.E.C., LCSW, etc.)       No results found for any visits on 03/02/22.  Medications   OLANZapine zydis (zyPREXA) ODT tab 5 mg (5 mg Oral Given 3/2/22 2043)        Assessments & Plan (with Medical Decision Making)   Patient with intellectual disability who has limited coping skills and habitually seeks care in the ED to get her needs met. She now feels calm and is ready to return to her group home. She appears at baseline. She can be discharged. She is encouraged to follow-up established care and services.    I have reviewed the nursing notes. I have reviewed the findings, diagnosis, plan  and need for follow up with the patient.    New Prescriptions    No medications on file       Final diagnoses:   Acute reaction to stress   Intellectual disability       --  Magno Sena MD  [facility] EMERGENCY DEPARTMENT  3/2/2022     Magno Sena MD  03/02/22 [id number]

## 2022-03-03 NOTE — ED NOTES
This writer talked to  staff, Sophie at [phone]. She was informed that patient will be discharged back to . Per MD and  staff, patient will be discharged back to  via ambulance.

## 2022-03-03 NOTE — DISCHARGE INSTRUCTIONS
"  Aftercare Plan  If I am feeling unsafe or I am in a crisis, I will:   Contact my established care providers   Call the National Suicide Prevention Lifeline: 331.982.3150   Go to the nearest emergency room   Call 911     Warning signs that I or other people might notice when a crisis is developing for me: isolating, calling 911 without talking to  staff    Things I am able to do on my own to cope or help me feel better: listen to music, dance    Things that I am able to do with others to cope or help me feel better: talk, do an art project or go for a walk    Things I can use or do for distraction: tv or game on phone    Changes I can make to support my mental health and wellness: use your coping skills, use your prn medications as needed and remember that the bad feelings come and then go away if you use your coping skills    People in my life that I can ask for help: Therapist, family, group home staff and crisis hotlines    Your [facility] has a mental health crisis team you can call 24/7: [facility] Mobile Crisis  [phone] (adults)  [phone] (children)    Additional resources and information: If would be benefical for Group Home staff to establish a care plan with use of community supports (like paramedic visits or cope), along with  and Therapy to encourage pt to use her coping skills during her waves of emotional dysregulation..      Crisis Lines  Crisis Text Line  Text 456498  You will be connected with a trained live crisis counselor to provide support.    Por espanol, texto  SIRENA a 839990 o texto a 442-AYUDAME en WhatsApp    [place] Hope Line  1.800.SUICIDE [8689827]      Community Resources  Fast Tracker  Linking people to mental health and substance use disorder resources  fasttrackermn.org     [place] Mental [facility] Warm Line  Peer to peer support  Monday thru Saturday, 12 pm to 10 pm  323.473.4484 or 1.909.583.7464  Text \"Support\" to 83400    [employer] [place] on " Mental Illness (MAGY)  373.457.0055 or 1.888.MAGY.HELPS        Mental Health Apps  My3  https://myP4RCpp.org/    VirtualHopeBox  https://Yaphie.org/apps/virtual-hope-box/

## 2022-03-03 NOTE — ED NOTES
"3/2/2022  Sadaf Ross 2000     [facility] Crisis Assessment    Patient was assessed: in person  Patient location: [facility]     Referral Data and Chief Complaint  Sadaf is a 21 year old who uses female pronouns. Patient presented to the ED via EMS and was referred to the ED by other: pt reports that the [facility] clinic nurse line called 911 today, yet she also reported that Yisel from  also thought she should come into ED.. Patient is presenting to the ED for the following concerns: Pt reports that she 'attempted suicide', when questioned she admits that she \"almost cut herself'.      Informed Consent and Assessment Methods    Patient is her own guardian. Writer met with patient and explained the crisis assessment process, including applicable information disclosures and limits to confidentiality, assessed understanding of the process, and obtained consent to proceed with the assessment. Patient was observed to be able to participate in the assessment as evidenced by desire to talk to [facility]. Assessment methods included conducting a formal interview with patient, review of medical records, collaboration with medical staff, and obtaining relevant collateral information from family and community providers when available.    Summary of Presenting Problem/ current functioning/ hx  Today pt had appt at [facility] and reports that she saw a student and was referred to [facility]PS.  APS saw pt and assessed her as low risk and discharged back to .  Pt went home and had dinner , even took her evening meds before coming here tonight.  She reports that her  staff Yisel wanted her to come here, although she also said that she called the [facility] clinic nurse line and they called 911 so this is unclear.   Pt appears tired and she admits that she has had a long day.  Initially she said she was sent her because she had a a suicide attempt, but when specifically asked she states that she 'almost cut herself'.  Provided positive " "reinforcement for pt not cutting herself and provided additional supportive counseling and psychoed about emotional waves that calm down with time and her coping skills.  Pt says she understands and admits that it si \"hard to use them\" (coping skills).  She also states that her 'mind' tells her not to use her prn meds or coping skills and come here instead.  Denies psychotic sx.  Pt has had roughly 14 ED visits in the past month when she reports having si or emotional dysregulation.  She lives in a group home but is her own guardian and often uses her own cell phone to call 911.  On occasion paramedics have been able to calm pt and have her stay at group home, but it appears that  staff struggles to encourage pt to use her coping skills prior to calling  (though sometimes she calls without  staff being aware).        Collateral Information  Pt has been seen by this writer twice in the past couple weeks.  Reviewed APS and [facility] clinic notes from today.  There is some question about pt not being eligible for care coordination through [facility].  Pt was also seen by [facility] SW on 2/28/22 and si was noted as baseline, pt safe to return back to .  Hx of requesting Case Management supports in reviewing pt's ability to make medical decisions without  or other supports.    Risk Assessment    Risk of Harm to Self     ESS-6  1.a. Over the past 2 weeks, have you had thoughts of killing yourself? Yes  1.b. Have you ever attempted to kill yourself and, if yes, when did this last happen? No   2. Recent or current suicide plan? No   3. Recent or current intent to act on ideation? No  4. Lifetime psychiatric hospitalization? No  5. Pattern of excessive substance use? No  6. Current irritability, agitation, or aggression? No  Scoring note: BOTH 1a and 1b must be yes for it to score 1 point, if both are not yes it is zero. All others are 1 point per number. If all questions 1a/1b - 6 are no, risk is negligible. If one of 1a/1b is " yes, then risk is mild. If either question 2 or 3, but not both, is yes, then risk is automatically moderate regardless of total score. If both 2 and 3 are yes, risk is automatically high regardless of total score.     Score: 0, mild risk  Pt has sib urges, not really si    The patient has the following risk factors for suicide: depressive symptoms, poor decision making and recent loss    Is the patient experiencing current suicidal ideation: Yes. Passive wish to be dead without thoughts or plan. ( hx of wanting to be with father, no desire to die expressed today)    Is the patient engaging in preparatory suicide behaviors (formulating how to act on plan, giving away possessions, saying goodbye, displaying dramatic behavior changes, etc)? No    Does the patient have access to firearms or other lethal means? no    The patient has the following protective factors: social support, voluntarily seeking mental health support, established relationship community mental health provider(s) and safe/stable housing    Support system information: lives in group home that is safe, support from family, therapist and     Patient strengths: knows how to access crisis services when she wants to     Does the patient engage in non-suicidal self-injurious behavior (NSSI/SIB)? Yes hx of sib cutting, none today    Is the patient vulnerable to sexual exploitation?  Yes due to intellectual disabilities    Is the patient experiencing abuse or neglect? no    Is the patient a vulnerable adult? Yes, lives in a Group Home yet is still her own guardian      Risk of Harm to Others  The patient has the following risk factors of harm to others: no risk factors identified    Does the patient have thoughts of harming others? No    Is the patient engaging in sexually inappropriate behavior?  no       Current Substance Abuse    Is there recent substance abuse? no    Was a urine drug screen or blood alcohol level obtained: No      Current  Symptoms/Concerns    Symptoms  Attention, hyperactivity, and impulsivity symptoms present: No    Anxiety symptoms present: Yes: Generalized Symptoms: Avoidance and Other: uncomfortable with emotions so comes to ED for support      Appetite symptoms present: No     Behavioral difficulties present: No     Cognitive impairment symptoms present: No    Depressive symptoms present: Yes Depressed mood, Feelings of helplessness  and Impaired decision making      Eating disorder symptoms present: No    Learning disabilities, cognitive challenges, and/or developmental disorder symptoms present: Yes: Developmental Incidents, Mood and Self-Regulation     Manic/hypomanic symptoms present: No    Personality and interpersonal functioning difficulties present : Yes: Emotional Deregulation and Impaired Impulse Control    Psychosis symptoms present: No      Sleep difficulties present: No    Substance abuse disorder symptoms present: No     Trauma and stressor related symptoms present: No           Mental Status Exam   Affect: Appropriate and Other: dulled, appearing tired   Appearance: Appropriate  Wearing the same kitten sweatshirt as last two visits  Attention Span/Concentration: Attentive?    Eye Contact: Variable   Fund of Knowledge: Delayed    Language /Speech Content: Fluent   Language /Speech Volume: Normal    Language /Speech Rate/Productions: Normal    Recent Memory: Intact and Variable   Remote Memory: Variable   Mood: Anxious and Sad    Orientation to Person: Yes    Orientation to Place: Yes   Orientation to Time of Day: Yes    Orientation to Date: Answer: didn't ask    Situation (Do they understand why they are here?): Yes    Psychomotor Behavior: Normal and Underactive    Thought Content: Clear and Other: baseline    Thought Form: Intact, agreeable      Mental Health and Substance Abuse History     History  Current and historical diagnoses or mental health concerns:  ADHD, Bi Polar Disorder, Borderline Personality  Disorder, MDD, Intellectual Disability     Prior MH services (inpatient, programmatic care, outpatient, etc) : Yes Yes Extensive  Patient has an extensive care team with outpatient therapy in an [facility] worker, and medication management through [facility], a PCP through [facility] (no specific provider noted), and a  Jyoti at [phone]. Pt currently lives at a group home ([phone] direct line to [facility])     Has the patient used [facility] crisis team services before?: hx of contacting cope, did   not do that today     History of substance abuse: No     Prior LIZZETTE services (inpatient, programmatic care, detox, outpatient, etc) : No     History of commitment: No     Family history of MH/LIZZETTE: Yes pt unable to provide details     Trauma history: Yes pt unable to provide details     Medication  Psychotropic medications: Abilify, Cogentin, Hydroxyzine, Naltrexone, zyprexa, effexor     Current Care Team  Primary Care Provider: Yes. Name: [facility]. Location: [place]. Date of last visit: unknown. Frequency: unknown. Perceived helpfulness: yes.     Psychiatrist: Yes. Name: Emma Jacobson. Location: [facility]. Date of last visit: unknown. Frequency: unknown. Perceived helpfulness: yes.     Therapist: Yes. Tara with [facility]     : Yes. Name: Jyoti Malik. Location: [facility] ([phone]). Date of last visit: unknown. Frequency: unknown. Perceived helpfulness: unknown.     [facility] or [facility]: [facility]      Release of Information  Was a release of information signed: Yes. Providers included on the release: [facility], Group Home, , therapist      Biopsychosocial Information    Current living situation: Lives in      Employment/income source: ssdi            Relevant legal issues: na     Cultural, Nondenominational, or spiritual influences on mental health care: na     Is the patient active in the  or a  : No        Relevant Medical Concerns   Patient identifies concerns with completing ADLs? No      Patient can ambulate independently? Yes      Other medical concerns? No      History of concussion or TBI? No          Diagnosis    301.83 (F60.3) Borderline Personality Disorder - by history     311 (F32.9) Unspecified Depressive Disorder  - by history        (F70  ) unspecified Intellectual Disabilities - by hx             Therapeutic Intervention  The following therapeutic methodologies were employed when working with the patient: active listening, assessing dimensions of crisis, identifying additional supports and alternative coping skills, establishing a discharge plan, psychoeducation and DBT skills. Patient response to intervention: pt expresses admission that it is 'hard to use them' (coping skills).  She seemed to feel comfortable thinking about what therapist would tell her when she is struggling to come up with her own ideas to manage her emotional dysregulation.  Was calm and tired and reported she could be safe back at  so was ready to leave.      Disposition  Recommended disposition: Group Home: [facility]/ [facility] [phone]      Reviewed case and recommendations with attending provider. Attending Name: Dr Magno Sena     Attending concurs with disposition: Yes      Patient concurs with disposition: Yes      Guardian concurs with disposition: NA     Final disposition: Group home: pt to call sister for a ride, nurse called  .     IClinical Substantiation of Recommendations   Rationale with supporting factors for disposition and diagnosis.     Pt says she is 'suicidal' when she is having urges to self harm/ cut.  She was able to not cut today, however she tends to seek ED visits instead of getting support to use her coping skills in the  setting.  She is currently tired, has no suicidal plan, took her evening meds already and states she can be safe to return to  tonight.  Pt  appears to be at baseline.   Recommend discharge home to , as well as team care plan meeting for better management of pt's needs.  She should follow up with existing providers.    Assessment Details  Patient interview started at: 8:00pm and completed at: 8:25pm.    Total duration spent on the patient case in minutes: 1.75 hrs     CPT code(s) utilized: 63678 - Psychotherapy for Crisis - 60 (30-74*) min       Aftercare and Safety Planning  Follow up plans with MH/LIZZETTE services: Yes follow up with exisiting providers      Aftercare plan placed in the AVS and provided to patient: Yes. Given to patient by ED nursing staff    Tori Baxter, [facility]      Aftercare Plan  If I am feeling unsafe or I am in a crisis, I will:   Contact my established care providers   Call the National Suicide Prevention Lifeline: 101.749.5580   Go to the nearest emergency room   Call 911     Warning signs that I or other people might notice when a crisis is developing for me: isolating, calling 911 without talking to  staff    Things I am able to do on my own to cope or help me feel better: listen to music, dance    Things that I am able to do with others to cope or help me feel better: talk, do an art project or go for a walk    Things I can use or do for distraction: tv or game on phone    Changes I can make to support my mental health and wellness: use your coping skills, use your prn medications as needed and remember that the bad feelings come and then go away if you use your coping skills    People in my life that I can ask for help: Therapist, family, group home staff and crisis hotlines    Your [facility] has a mental health crisis team you can call 24/7: [facility] Mobile Crisis  [phone] (adults)  [phone] (children)    Additional resources and information: If would be benefical for Group Home to establish a care plan with use of community supports (like paramedic visits or cope), along with  and  "Therapy.      Crisis Lines  Crisis Text Line  Text 967134  You will be connected with a trained live crisis counselor to provide support.    Por espanol, texto  SIRENA a 427810 o texto a 442-AYUDAME en WhatsApp    National Hope Line  1.800.SUICIDE [9909785]      Community Resources  Fast Tracker  Linking people to mental health and substance use disorder resources  fastZivixn.org     [place] Mental Health Warm Line  Peer to peer support  Monday thru Saturday, 12 pm to 10 pm  595.645.7217 or 0.954.494.0870  Text \"Support\" to 40108    National [place] on Mental Illness (MAGY)  868.189.5218 or 1.888.MAGY.HELPS        Mental Health Apps  My3  https://myKeystone Mobile Partnerpp.org/    VirtualHopeBox  https://Farecast.org/apps/virtual-hope-box/          "

## 2022-03-04 ENCOUNTER — HOSPITAL ENCOUNTER (EMERGENCY)
Facility: CLINIC | Age: 22
Discharge: HOME | End: 2022-03-04
Attending: PSYCHIATRY & NEUROLOGY | Admitting: PSYCHIATRY & NEUROLOGY
Payer: MEDICARE

## 2022-03-04 VITALS
RESPIRATION RATE: 14 BRPM | DIASTOLIC BLOOD PRESSURE: 70 MMHG | OXYGEN SATURATION: 98 % | TEMPERATURE: 97.9 F | SYSTOLIC BLOOD PRESSURE: 138 MMHG | HEART RATE: 101 BPM

## 2022-03-04 DIAGNOSIS — F79 INTELLECTUAL DISABILITY: ICD-10-CM

## 2022-03-04 DIAGNOSIS — F43.0 ACUTE REACTION TO STRESS: ICD-10-CM

## 2022-03-04 PROCEDURE — 99285 EMERGENCY DEPT VISIT HI MDM: CPT | Mod: 25

## 2022-03-04 PROCEDURE — 90791 PSYCH DIAGNOSTIC EVALUATION: CPT

## 2022-03-04 PROCEDURE — 250N000013 HC RX MED GY IP 250 OP 250 PS 637: Performed by: PSYCHIATRY & NEUROLOGY

## 2022-03-04 PROCEDURE — 99284 EMERGENCY DEPT VISIT MOD MDM: CPT | Performed by: PSYCHIATRY & NEUROLOGY

## 2022-03-04 RX ORDER — OLANZAPINE 5 MG/1
5 TABLET, ORALLY DISINTEGRATING ORAL ONCE
Status: COMPLETED | OUTPATIENT
Start: 2022-03-04 | End: 2022-03-04

## 2022-03-04 RX ADMIN — OLANZAPINE 5 MG: 5 TABLET, ORALLY DISINTEGRATING ORAL at 18:55

## 2022-03-04 ASSESSMENT — ENCOUNTER SYMPTOMS
MUSCULOSKELETAL NEGATIVE: 1
RESPIRATORY NEGATIVE: 1
NERVOUS/ANXIOUS: 1
EYES NEGATIVE: 1
ENDOCRINE NEGATIVE: 1
HALLUCINATIONS: 0
CARDIOVASCULAR NEGATIVE: 1
HYPERACTIVE: 0
GASTROINTESTINAL NEGATIVE: 1
CONSTITUTIONAL NEGATIVE: 1
NEUROLOGICAL NEGATIVE: 1

## 2022-03-05 ENCOUNTER — MEDICAL CORRESPONDENCE (OUTPATIENT)
Dept: HEALTH INFORMATION MANAGEMENT | Facility: CLINIC | Age: 22
End: 2022-03-05
Payer: MEDICARE

## 2022-03-05 NOTE — ED PROVIDER NOTES
"ED Provider Note  [facility]      History     Chief Complaint   Patient presents with     Depression     HPI  Sadaf Ross is a 21 year old female who is here for her 25th ED visit since beginning of the year. She was last seen 2 days ago for \"suicidal thoughts,\" or rather thoughts of cutting. Her records reveal that she was seen in her clinic today for abdominal discomfort and chest pain but the provider did not feel they have enough time to work her up as the clinic closes in 30 minutes and was told to go the the ED. Patient must have gone home and later called 911 as she was feeling depressed.    As noted by her frequent visits, patient uses the ED excessively to get her needs met. She does not want to use any coping skills to deal with her distress and anxiety and worries.    Patient was given s dose of Zyprexa 5 mg which she took. After speaking with the , she was feeling better and ready to return to her group home. There is no suicidal thinking nor psychosis.    Please see DEC Crisis Assessment on 3/4/22 in Epic for further details.     PERSONAL MEDICAL HISTORY  Past Medical History:   Diagnosis Date     ADHD (attention deficit hyperactivity disorder)      Bipolar 1 disorder (H)      Borderline personality disorder (H)      Depression      Depressive disorder      Intellectual disability      Obesity      Syncope      PAST SURGICAL HISTORY  Past Surgical History:   Procedure Laterality Date     APPENDECTOMY       APPENDECTOMY       FAMILY HISTORY  Family History   Problem Relation Age of Onset     Diabetes Type 1 Father      Cancer Paternal Grandfather      SOCIAL HISTORY  Social History     Tobacco Use     Smoking status: Current Every Day Smoker     Packs/day: 1.00     Years: 5.00     Pack years: 5.00     Types: Cigarettes     Smokeless tobacco: Never Used   Substance Use Topics     Alcohol use: No     MEDICATIONS  No current facility-administered medications for " this encounter.     Current Outpatient Medications   Medication     ARIPiprazole ER (ABILIFY MAINTENA) 400 MG extended release inj syringe     benztropine (COGENTIN) 0.5 MG tablet     calcium carbonate (TUMS) 500 MG chewable tablet     chlorhexidine (CHLORHEXIDINE) 0.12 % solution     docusate sodium (COLACE) 100 MG capsule     famotidine (PEPCID) 10 MG tablet     hydrOXYzine (ATARAX) 50 MG tablet     metoclopramide (REGLAN) 10 MG tablet     norgestimate-ethinyl estradiol (SPRINTEC 28) 0.25-35 MG-MCG tablet     OLANZapine (ZYPREXA) 2.5 MG tablet     OLANZapine zydis (ZYPREXA) 5 MG ODT     omeprazole (PRILOSEC) 40 MG DR capsule     ondansetron (ZOFRAN) 4 MG tablet     polyethylene glycol (MIRALAX) 17 g packet     prochlorperazine (COMPAZINE) 10 MG tablet     promethazine (PHENERGAN) 25 MG suppository     venlafaxine (EFFEXOR-XR) 150 MG 24 hr capsule     Vitamin D, Cholecalciferol, 25 MCG (1000 UT) TABS     ALLERGIES  Allergies   Allergen Reactions     Penicillins Rash and Unknown        Review of Systems   Constitutional: Negative.    HENT: Negative.    Eyes: Negative.    Respiratory: Negative.    Cardiovascular: Negative.    Gastrointestinal: Negative.    Endocrine: Negative.    Genitourinary: Negative.    Musculoskeletal: Negative.    Skin: Negative.    Neurological: Negative.    Psychiatric/Behavioral: Negative for hallucinations and suicidal ideas. The patient is nervous/anxious. The patient is not hyperactive.    All other systems reviewed and are negative.        Physical Exam   BP: (!) 133/91  Pulse: 111  Temp: 98.4  F (36.9  C)  Resp: 18  SpO2: 99 %  Physical Exam  Vitals and nursing note reviewed.   HENT:      Head: Normocephalic.   Eyes:      Pupils: Pupils are equal, round, and reactive to light.   Pulmonary:      Effort: Pulmonary effort is normal.   Musculoskeletal:         General: Normal range of motion.      Cervical back: Normal range of motion.   Neurological:      General: No focal deficit present.       Mental Status: She is alert.   Psychiatric:         Attention and Perception: Attention and perception normal. She does not perceive auditory or visual hallucinations.         Mood and Affect: Mood and affect normal.         Speech: Speech normal.         Behavior: Behavior normal. Behavior is not agitated, aggressive, hyperactive or combative. Behavior is cooperative.         Thought Content: Thought content normal. Thought content is not paranoid or delusional. Thought content does not include homicidal or suicidal ideation.         Cognition and Memory: Cognition and memory normal.         Judgment: Judgment normal.         ED Course      Procedures         Mental Health Risk Assessment      PSS-3    Date and Time Over the past 2 weeks have you felt down, depressed, or hopeless? Over the past 2 weeks have you had thoughts of killing yourself? Have you ever attempted to kill yourself? When did this last happen? User   03/04/22 1828 yes yes yes between 1 and 6 months ago [facility]      C-SSRS ([place])    Date and Time Q1 Wished to be Dead (Past Month) Q2 Suicidal Thoughts (Past Month) Q3 Suicidal Thought Method Q4 Suicidal Intent without Specific Plan Q5 Suicide Intent with Specific Plan Q6 Suicide Behavior (Lifetime) Within the Past 3 Months? RETIRED: Level of Risk per Screen Screening Not Complete User   03/04/22 1830 yes yes yes no no yes -- -- -- [facility]   03/04/22 1828 yes yes yes -- -- yes -- -- -- [facility]              Suicide assessment completed by mental health (D.E.C., LCSW, etc.)       No results found for any visits on 03/04/22.  Medications   OLANZapine zydis (zyPREXA) ODT tab 5 mg (5 mg Oral Given 3/4/22 1855)        Assessments & Plan (with Medical Decision Making)   Patient with history of intellectual disability who has poor coping skills and limited frustration tolerance. She tends to use the ED excessively to get her needs met when in distress. She now is calm, reporting that the Zyprexa dose helped and  is ready to return to her group home. She appears at baseline. She can be discharged. She is recommended to follow-up established care and services.    I have reviewed the nursing notes. I have reviewed the findings, diagnosis, plan and need for follow up with the patient.    New Prescriptions    No medications on file       Final diagnoses:   Intellectual disability   Acute reaction to stress       --  Magno Sena MD  [facility] EMERGENCY DEPARTMENT  3/4/2022     Magno Sena MD  03/04/22 1954

## 2022-03-05 NOTE — DISCHARGE INSTRUCTIONS
If I am feeling unsafe or I am in a crisis, I will:   Contact my established care providers   Call the National Suicide Prevention Lifeline: 912.557.5114   Go to the nearest emergency room   Call 911          Warning signs that I or other people might notice when a crisis is developing for me: I am feeling suicidal and I have tried my coping skills and talked to staff and nothing is helping before I call 911.    Things I am able to do on my own to cope or help me feel better: Go for walks. Listening to my old school country music    Things that I am able to do with others to cope or help me better: Talk with staff or my peers helps.     Things I can use or do for distraction: Reading or watching a good movie. Going for walks.    Changes I can make to support my mental health and wellness: Learn and use coping skills before calling 911. Us 911 only if necessary.    People in my life that I can ask for help: Group home staff. Peers.    Your county has a mental health crisis team you can call 24/7: [facility] Crisis: Adult [phone]  Child [phone]    Other things that are important when I m in crisis: Do not call 911 unless it is serious and unmanageable. I am not alone. I have skills and people that can help.

## 2022-03-05 NOTE — ED NOTES
Pt placed on a one to one since arrival.  Pt to be moved to [facility] and transport arranged.

## 2022-03-05 NOTE — ED NOTES
"3/4/2022  Sadaf Ross 2000     [facility] Crisis Assessment    Patient was assessed: in person  Patient location: [facility]    Referral Data and Chief Complaint  Sadaf Ross is a 21 year old who uses she/her pronouns. Patient presented to the ED via EMS and was referred to the ED by self. Patient is presenting to the ED for the following concerns: depression.      Informed Consent and Assessment Methods    Patient is her own guardian. Writer met with patient and explained the crisis assessment process, including applicable information disclosures and limits to confidentiality, assessed understanding of the process, and obtained consent to proceed with the assessment. Patient was observed to be able to participate in the assessment as evidenced by participation. Assessment methods included conducting a formal interview with patient, review of medical records, collaboration with medical staff, and obtaining relevant collateral information from family and community providers when available.    Narrative Summary of Presenting Problem and Current Functioning  What led to the patient presenting for crisis services, factors that make the crisis life threatening or complex, stressors, how is this disrupting the patient's life, and how current functioning is in comparison to baseline. How is patient presenting during the assessment.     Patient presented to [facility] ED with concerns for depression. Patient called 911 with thoughts of suicide and self injurious behavior, but not engaging in either. She states that she \"almost cut her self today\" and called 911 instead, as she is struggling with feeling surrounding her fathers death. Patient is frequent user of EMS and 911 as a coping mechanism instead of employing the variety of additional alternative coping skills that are available to her, and she endorses knowing. Her baseline is suicidal ideation without any real intention of following through with a suicide plan. " Patient presents in the ED during assessment as conversational, engaged, pleasant, oriented X4, and answered all questions posed. Patient when asked, stated she was feeling good enough to return to her group home tonight, denying any current suicidal risk, homicidal risk, or self injurious intention.     History of the Crisis  Duration of the current crisis, coping skills attempted to reduce the crisis, community resources used, and past presentations.    Patient stated that she was feeling sad today and suicidal, reflecting on the death of her father, which appears to be a driving force in her emotional decompensation, despite addressing it with her therapist and care team. Patient additionally, endorsed having two pocket knives at her group home. Following collateral calls to her group home, they were advised of her comments and will inquire of her regarding this on her return. Patient states that her sleep and appetite are good, and that going for walks, listening to her country music, and talking, seems to help, when she engages them as a coping skill. She did say that it is hard to remember those skills sometimes, resulting in presentations to the ED. Patient is her own advocate and guardian, and has her own cell phone, resulting in her ability to engage emergency services without her group home staff sometimes being aware. She stated she feels her medications are working, but only takes medications currently at night. Patient has presented 15 times in the past month to the ED via EMS for same or similar decompensation concerns, as opposed to using COPE or any learned skills for effect. Patient's care team encouraged to revisit efforts to impart coping skills going forward, and visit extensive use of EMS as transport.     Collateral Information  [facility] staff: [phone]    Risk Assessment    Risk of Harm to Self     ESS-6  1.a. Over the past 2 weeks, have you had thoughts of killing yourself? Yes  1.b.  Have you ever attempted to kill yourself and, if yes, when did this last happen? No   2. Recent or current suicide plan? No   3. Recent or current intent to act on ideation? No  4. Lifetime psychiatric hospitalization? Yes  5. Pattern of excessive substance use? No  6. Current irritability, agitation, or aggression? No  Scoring note: BOTH 1a and 1b must be yes for it to score 1 point, if both are not yes it is zero. All others are 1 point per number. If all questions 1a/1b - 6 are no, risk is negligible. If one of 1a/1b is yes, then risk is mild. If either question 2 or 3, but not both, is yes, then risk is automatically moderate regardless of total score. If both 2 and 3 are yes, risk is automatically high regardless of total score.     Score: 0, mild risk. SIB more than SI    The patient has the following risk factors for suicide: depressive symptoms, poor decision making, poor impulse control, preoccupied with death/dying and restless/agitated    Is the patient experiencing current suicidal ideation: Yes. Thoughts to kill self with no plan or intent    Is the patient engaging in preparatory suicide behaviors (formulating how to act on plan, giving away possessions, saying goodbye, displaying dramatic behavior changes, etc)? No    Does the patient have access to firearms or other lethal means? no Patient alluded to having two pocket knives at the group home. Arlene at home notified of this. Patient advised to turn over to staff.    The patient has the following protective factors: social support, voluntarily seeking mental health support, established relationship community mental health provider(s) and safe/stable housing    Support system information: patient is one of three at a group home, staffed by two personnel throughout the day/night    Does the patient engage in non-suicidal self-injurious behavior (NSSI/SIB)? yes. Method:cutting. Frequency:unclear. When she decompensates generally Duration:unclear  History: lenbennyty    Is the patient vulnerable to sexual exploitation?  Yes. Due to intellectual disabilities.    Is the patient experiencing abuse or neglect? no    Is the patient a vulnerable adult? Yes. Added concern for being a resident of a group home, her ongoing chronic use of the ED and 911, and continuing being her own guardian.      Risk of Harm to Others  The patient has the following risk factors of harm to others: no risk factors identified    Does the patient have thoughts of harming others? No    Is the patient engaging in sexually inappropriate behavior?  no       Current Substance Abuse    Is there recent substance abuse? no    Was a urine drug screen or blood alcohol level obtained: No    CAGE AID  Have you felt you ought to cut down on your drinking or drug use?  No  Have people annoyed you by criticizing your drinking or drug use? No  Have you felt bad or guilty about your drinking or drug use? No  Have you ever had a drink or used drugs first thing in the morning to steady your nerves or to get rid of a hangover? No  Score: 0/4       Current Symptoms/Concerns    Symptoms  Attention, hyperactivity, and impulsivity symptoms present: No    Anxiety symptoms present: Yes: Generalized Symptoms: Avoidance and Other: inability to utilize coping skills. Continue to turn to ED as misappropriated support go to.      Appetite symptoms present: No     Behavioral difficulties present: No     Cognitive impairment symptoms present: No    Depressive symptoms present: Yes Depressed mood, Feelings of helplessness , Impaired decision making  and Thoughts of suicide/death      Eating disorder symptoms present: No    Learning disabilities, cognitive challenges, and/or developmental disorder symptoms present: Yes: Developmental Incidents, Mood and Self-Regulation     Manic/hypomanic symptoms present: No    Personality and interpersonal functioning difficulties present : Yes: Emotional Deregulation    Psychosis symptoms  present: No      Sleep difficulties present: No    Substance abuse disorder symptoms present: No     Trauma and stressor related symptoms present: No           Mental Status Exam   Affect: Appropriate   Appearance: Appropriate    Attention Span/Concentration: Attentive?    Eye Contact: Engaged and Variable   Fund of Knowledge: Delayed    Language /Speech Content: Fluent   Language /Speech Volume: Normal    Language /Speech Rate/Productions: Normal    Recent Memory: Intact   Remote Memory: Intact   Mood: Anxious and Sad    Orientation to Person: Yes    Orientation to Place: Yes   Orientation to Time of Day: Yes    Orientation to Date: Yes    Situation (Do they understand why they are here?): Yes    Psychomotor Behavior: Normal    Thought Content: Clear   Thought Form: Intact       Mental Health and Substance Abuse History    History  Current and historical diagnoses or mental health concerns: ADHD, Bi Polar Disorder, Borderline Personality Disorder, MDD, Intellectual Disability    Prior MH services (inpatient, programmatic care, outpatient, etc) : Yes patient has an extensive care team in place to address ongoing issues: patient has an [facility] worker and medication management through [facility], a [facility]  Jyoti @ [phone], and a PCP through [facility]    Has the patient used [facility] crisis team services before?: Yes COPE    History of substance abuse: No    Prior LIZZETTE services (inpatient, programmatic care, detox, outpatient, etc) : No    History of commitment: No    Family history of MH/LIZZETTE: Yes patient is poor historian    Trauma history: Yes Patient is poor historian. Endorses this however.    Medication  Psychotropic medications: Yes. Pt is currently taking Abilify, Hydroxyzine, Naltrexone, Zyprexa, Effexor.. Medication compliant: Yes. Recent medication changes: No    Current Care Team  Primary Care Provider: Yes. Name: [facility]. Location: [place]. Date of last visit: unknown. Frequency: unknown.  Perceived helpfulness: yes.    Psychiatrist: Yes. Name: Dr. MARTÍNEZ Jacobson. Location: [place], [place]. Date of last visit: unknown. Frequency: unknown. Perceived helpfulness: yes.    Therapist: Yes. Name: Franchesca. Location: [facility]. Date of last visit: unknown. Frequency: unknown. Perceived helpfulness: yes.    : Yes. Name: Jyoti Malik. Location: [facility]. Date of last visit: [id number]. Frequency: unknown. Perceived helpfulness: yes.    CTSS or ARMHS: Yes. [facility]    ACT Team: No    Other: No    Release of Information  Was a release of information signed: No. Reason: releases on file.      Biopsychosocial Information    Socioeconomic Information  Current living situation: Group home    Employment/income source: SSDI    Relevant legal issues: None reported    Cultural, Cheondoism, or spiritual influences on mental health care: None reported    Is the patient active in the  or a : No      Relevant Medical Concerns   Patient identifies concerns with completing ADLs? No     Patient can ambulate independently? Yes     Other medical concerns? No     History of concussion or TBI? No        Diagnosis    301.83 (F60.3) Borderline Personality Disorder - by history     311 (F32.9) Unspecified Depressive Disorder  - by history     Intellectual Disabilites  317 (F70) Mild - by history       Therapeutic Intervention  The following therapeutic methodologies were employed when working with the patient: establishing rapport, active listening, assessing dimensions of crisis, solution focused brief therapy, identifying additional supports and alternative coping skills, establishing a discharge plan, safety planning, motivational interviewing, brief supportive therapy, DBT skills and harm reduction. Patient response to intervention: positive.      Disposition  Recommended disposition: Group Home: Ph: [phone]      Reviewed  case and recommendations with attending provider. Attending Name: Dr. ZONIA Monroy MD      Attending concurs with disposition: Yes      Patient concurs with disposition: Yes      Guardian concurs with disposition: NA     Final disposition: Group home: return to established group home .        Clinical Substantiation of Recommendations   Rationale with supporting factors for disposition and diagnosis.     Patient stated that she was depressed today and unable to cope with her feeling surrounding the death last year of her father. Patient stated she was unable to access or use her learned coping skills, resulting in her 911 call and presentation I the ED. Patient is a chronic user of EMS and the ED in lieu of personal coping skills or engaging group home staff. Patient endorsed for safety and stated she was not currently suicidal, homicidal, nor going to self harm. Patient is discharged back to group home. Group home notified and ready to receive patient back. Patient advised to follow up with her care team to find better management of her coping skills.       Assessment Details  Patient interview started at: 7:30pm and completed at: 8:00pm.    Total duration spent on the patient case in minutes: .50 hrs     CPT code(s) utilized: 88887 - Psychotherapy for Crisis - 60 (30-74*) min       Aftercare and Safety Planning  Follow up plans with MH/LIZZETTE services: Yes Follow up with current providers/team.      Aftercare plan placed in the AVS and provided to patient: Yes. Given to patient by RN    Mario Andrew MA      Aftercare Plan  If I am feeling unsafe or I am in a crisis, I will:   Contact my established care providers   Call the National Suicide Prevention Lifeline: 827.843.4829   Go to the nearest emergency room   Call 911          Warning signs that I or other people might notice when a crisis is developing for me: I am feeling suicidal and I have tried my coping skills and talked to staff and nothing is helping  "before I call 911.    Things I am able to do on my own to cope or help me feel better: Go for walks. Listening to my old school country music    Things that I am able to do with others to cope or help me better: Talk with staff or my peers helps.     Things I can use or do for distraction: Reading or watching a good movie. Going for walks.    Changes I can make to support my mental health and wellness: Learn and use coping skills before calling 911. Us 911 only if necessary.    People in my life that I can ask for help: Group home staff. Peers.    Your county has a mental health crisis team you can call 24/7: [facility] Crisis: Adult [phone]  Child [phone]    Other things that are important when I m in crisis: Do not call 911 unless it is serious and unmanageable. I am not alone. I have skills and people that can help.          Additional resources and information:      Crisis Lines  Crisis Text Line  Text 388733  You will be connected with a trained live crisis counselor to provide support.    Por espanol, texto  SIRENA a 458491 o texto a 442-AYUDAME en WhatsApp    National Hope Line  1.800.SUICIDE [6238278]      Community Resources  Fast Tracker  Linking people to mental health and substance use disorder resources  fasttrackTravelTrianglen.org     [place] Mental Health Warm Line  Peer to peer support  Monday thru Saturday, 12 pm to 10 pm  501.792.2529 or 0.488.303.8473  Text \"Support\" to 38069    National [place] [employer] Mental Illness (MAGY)  455.044.3371 or 1.888.MAGY.HELPS        Mental Health Apps  My3  https://my3app.org/    VirtualHopeBox  https://[employer].org/apps/virtual-hope-box/          "

## 2022-03-05 NOTE — ED NOTES
Bed: ED15  Expected date: 3/4/22  Expected time:   Means of arrival:   Comments:  N 734  20 y/o female SI

## 2022-03-05 NOTE — ED TRIAGE NOTES
Per EMS the Pt called 911 because she was depressed and having thoughts of self harm but called 911 instead.

## 2022-03-05 NOTE — ED NOTES
Pt states increasing depression over the loss of her father a year ago and states SI & SIB but denies any attempts or self injury.

## 2022-03-09 ENCOUNTER — HOSPITAL ENCOUNTER (EMERGENCY)
Facility: CLINIC | Age: 22
Discharge: HOME | End: 2022-03-09
Attending: INTERNAL MEDICINE | Admitting: INTERNAL MEDICINE
Payer: MEDICARE

## 2022-03-09 VITALS
SYSTOLIC BLOOD PRESSURE: 122 MMHG | BODY MASS INDEX: 39.82 KG/M2 | DIASTOLIC BLOOD PRESSURE: 70 MMHG | RESPIRATION RATE: 16 BRPM | HEART RATE: 80 BPM | TEMPERATURE: 98 F | WEIGHT: 232 LBS | OXYGEN SATURATION: 99 %

## 2022-03-09 DIAGNOSIS — R10.84 ABDOMINAL PAIN, GENERALIZED: ICD-10-CM

## 2022-03-09 LAB — HCG UR QL: NEGATIVE

## 2022-03-09 PROCEDURE — 90791 PSYCH DIAGNOSTIC EVALUATION: CPT

## 2022-03-09 PROCEDURE — 81025 URINE PREGNANCY TEST: CPT | Performed by: INTERNAL MEDICINE

## 2022-03-09 PROCEDURE — 99283 EMERGENCY DEPT VISIT LOW MDM: CPT | Performed by: INTERNAL MEDICINE

## 2022-03-09 PROCEDURE — 99285 EMERGENCY DEPT VISIT HI MDM: CPT | Mod: 25 | Performed by: INTERNAL MEDICINE

## 2022-03-10 ENCOUNTER — APPOINTMENT (OUTPATIENT)
Dept: ULTRASOUND IMAGING | Facility: CLINIC | Age: 22
End: 2022-03-10
Attending: EMERGENCY MEDICINE
Payer: MEDICARE

## 2022-03-10 ENCOUNTER — HOSPITAL ENCOUNTER (EMERGENCY)
Facility: CLINIC | Age: 22
Discharge: HOME | End: 2022-03-10
Attending: EMERGENCY MEDICINE | Admitting: EMERGENCY MEDICINE
Payer: MEDICARE

## 2022-03-10 VITALS
RESPIRATION RATE: 16 BRPM | SYSTOLIC BLOOD PRESSURE: 127 MMHG | TEMPERATURE: 98.8 F | OXYGEN SATURATION: 99 % | HEART RATE: 95 BPM | DIASTOLIC BLOOD PRESSURE: 61 MMHG

## 2022-03-10 DIAGNOSIS — R11.2 NAUSEA AND VOMITING, INTRACTABILITY OF VOMITING NOT SPECIFIED, UNSPECIFIED VOMITING TYPE: ICD-10-CM

## 2022-03-10 LAB
ALBUMIN SERPL-MCNC: 4.6 G/DL (ref 3.4–5)
ALBUMIN UR-MCNC: NEGATIVE MG/DL
ALP SERPL-CCNC: 57 U/L (ref 40–150)
ALT SERPL W P-5'-P-CCNC: 107 U/L (ref 0–50)
ANION GAP SERPL CALCULATED.3IONS-SCNC: 7 MMOL/L (ref 3–14)
APPEARANCE UR: ABNORMAL
AST SERPL W P-5'-P-CCNC: 44 U/L (ref 0–45)
BACTERIA #/AREA URNS HPF: ABNORMAL /HPF
BASOPHILS # BLD AUTO: 0.1 10E3/UL (ref 0–0.2)
BASOPHILS NFR BLD AUTO: 1 %
BILIRUB SERPL-MCNC: 0.3 MG/DL (ref 0.2–1.3)
BILIRUB UR QL STRIP: NEGATIVE
BUN SERPL-MCNC: 10 MG/DL (ref 7–30)
CALCIUM SERPL-MCNC: 10.4 MG/DL (ref 8.5–10.1)
CHLORIDE BLD-SCNC: 114 MMOL/L (ref 94–109)
CO2 SERPL-SCNC: 21 MMOL/L (ref 20–32)
COLOR UR AUTO: YELLOW
CREAT SERPL-MCNC: 0.74 MG/DL (ref 0.52–1.04)
EOSINOPHIL # BLD AUTO: 0.2 10E3/UL (ref 0–0.7)
EOSINOPHIL NFR BLD AUTO: 2 %
ERYTHROCYTE [DISTWIDTH] IN BLOOD BY AUTOMATED COUNT: 12.9 % (ref 10–15)
GFR SERPL CREATININE-BSD FRML MDRD: >90 ML/MIN/1.73M2
GLUCOSE BLD-MCNC: 95 MG/DL (ref 70–99)
GLUCOSE UR STRIP-MCNC: NEGATIVE MG/DL
HCG UR QL: NEGATIVE
HCT VFR BLD AUTO: 42.1 % (ref 35–47)
HGB BLD-MCNC: 14.3 G/DL (ref 11.7–15.7)
HGB UR QL STRIP: NEGATIVE
IMM GRANULOCYTES # BLD: 0.1 10E3/UL
IMM GRANULOCYTES NFR BLD: 1 %
INTERNAL QC OK POCT: NORMAL
KETONES UR STRIP-MCNC: NEGATIVE MG/DL
LEUKOCYTE ESTERASE UR QL STRIP: ABNORMAL
LIPASE SERPL-CCNC: 237 U/L (ref 73–393)
LYMPHOCYTES # BLD AUTO: 2.4 10E3/UL (ref 0.8–5.3)
LYMPHOCYTES NFR BLD AUTO: 26 %
MCH RBC QN AUTO: 28.2 PG (ref 26.5–33)
MCHC RBC AUTO-ENTMCNC: 34 G/DL (ref 31.5–36.5)
MCV RBC AUTO: 83 FL (ref 78–100)
MONOCYTES # BLD AUTO: 0.4 10E3/UL (ref 0–1.3)
MONOCYTES NFR BLD AUTO: 4 %
MUCOUS THREADS #/AREA URNS LPF: PRESENT /LPF
NEUTROPHILS # BLD AUTO: 6.4 10E3/UL (ref 1.6–8.3)
NEUTROPHILS NFR BLD AUTO: 66 %
NITRATE UR QL: NEGATIVE
NRBC # BLD AUTO: 0 10E3/UL
NRBC BLD AUTO-RTO: 0 /100
PH UR STRIP: 5.5 [PH] (ref 5–7)
PLATELET # BLD AUTO: 244 10E3/UL (ref 150–450)
POCT KIT EXPIRATION DATE: NORMAL
POCT KIT LOT NUMBER: NORMAL
POTASSIUM BLD-SCNC: 3.7 MMOL/L (ref 3.4–5.3)
PROT SERPL-MCNC: 8.7 G/DL (ref 6.8–8.8)
RBC # BLD AUTO: 5.07 10E6/UL (ref 3.8–5.2)
RBC URINE: 4 /HPF
SODIUM SERPL-SCNC: 142 MMOL/L (ref 133–144)
SP GR UR STRIP: >1.03 (ref 1–1.03)
SQUAMOUS EPITHELIAL: 8 /HPF
TRANSITIONAL EPI: <1 /HPF
UROBILINOGEN UR STRIP-MCNC: NORMAL MG/DL
WBC # BLD AUTO: 9.5 10E3/UL (ref 4–11)
WBC URINE: 11 /HPF

## 2022-03-10 PROCEDURE — 250N000011 HC RX IP 250 OP 636: Performed by: EMERGENCY MEDICINE

## 2022-03-10 PROCEDURE — 83690 ASSAY OF LIPASE: CPT | Performed by: EMERGENCY MEDICINE

## 2022-03-10 PROCEDURE — 99285 EMERGENCY DEPT VISIT HI MDM: CPT | Performed by: EMERGENCY MEDICINE

## 2022-03-10 PROCEDURE — 81025 URINE PREGNANCY TEST: CPT | Performed by: EMERGENCY MEDICINE

## 2022-03-10 PROCEDURE — 87086 URINE CULTURE/COLONY COUNT: CPT | Performed by: EMERGENCY MEDICINE

## 2022-03-10 PROCEDURE — 96375 TX/PRO/DX INJ NEW DRUG ADDON: CPT | Performed by: EMERGENCY MEDICINE

## 2022-03-10 PROCEDURE — 81001 URINALYSIS AUTO W/SCOPE: CPT | Performed by: EMERGENCY MEDICINE

## 2022-03-10 PROCEDURE — 36415 COLL VENOUS BLD VENIPUNCTURE: CPT | Performed by: EMERGENCY MEDICINE

## 2022-03-10 PROCEDURE — 76705 ECHO EXAM OF ABDOMEN: CPT

## 2022-03-10 PROCEDURE — 258N000003 HC RX IP 258 OP 636: Performed by: EMERGENCY MEDICINE

## 2022-03-10 PROCEDURE — 80053 COMPREHEN METABOLIC PANEL: CPT | Performed by: EMERGENCY MEDICINE

## 2022-03-10 PROCEDURE — 99285 EMERGENCY DEPT VISIT HI MDM: CPT | Mod: 25 | Performed by: EMERGENCY MEDICINE

## 2022-03-10 PROCEDURE — 96374 THER/PROPH/DIAG INJ IV PUSH: CPT | Performed by: EMERGENCY MEDICINE

## 2022-03-10 PROCEDURE — 96361 HYDRATE IV INFUSION ADD-ON: CPT | Performed by: EMERGENCY MEDICINE

## 2022-03-10 PROCEDURE — 85025 COMPLETE CBC W/AUTO DIFF WBC: CPT | Performed by: EMERGENCY MEDICINE

## 2022-03-10 RX ORDER — ONDANSETRON 4 MG/1
4 TABLET, ORALLY DISINTEGRATING ORAL EVERY 8 HOURS PRN
Qty: 10 TABLET | Refills: 0 | Status: SHIPPED | OUTPATIENT
Start: 2022-03-10 | End: 2022-03-13

## 2022-03-10 RX ORDER — ONDANSETRON 2 MG/ML
4 INJECTION INTRAMUSCULAR; INTRAVENOUS ONCE
Status: COMPLETED | OUTPATIENT
Start: 2022-03-10 | End: 2022-03-10

## 2022-03-10 RX ORDER — KETOROLAC TROMETHAMINE 30 MG/ML
30 INJECTION, SOLUTION INTRAMUSCULAR; INTRAVENOUS ONCE
Status: COMPLETED | OUTPATIENT
Start: 2022-03-10 | End: 2022-03-10

## 2022-03-10 RX ADMIN — KETOROLAC TROMETHAMINE 30 MG: 30 INJECTION, SOLUTION INTRAMUSCULAR at 10:41

## 2022-03-10 RX ADMIN — SODIUM CHLORIDE 1000 ML: 9 INJECTION, SOLUTION INTRAVENOUS at 09:54

## 2022-03-10 RX ADMIN — ONDANSETRON 4 MG: 2 INJECTION INTRAMUSCULAR; INTRAVENOUS at 09:54

## 2022-03-10 ASSESSMENT — ENCOUNTER SYMPTOMS
NAUSEA: 1
EYES NEGATIVE: 1
VOMITING: 1
FLANK PAIN: 0
SHORTNESS OF BREATH: 0
PSYCHIATRIC NEGATIVE: 1
HEADACHES: 0
MUSCULOSKELETAL NEGATIVE: 1
ABDOMINAL PAIN: 1
FEVER: 0

## 2022-03-10 NOTE — ED TRIAGE NOTES
Paramedics state that patient had a red slushee, and that she felt sick and vomited and that there was blood. Staff and paramedics state that it was the color of the red slushee and that she had spit out some of it in the trash can.

## 2022-03-10 NOTE — ED PROVIDER NOTES
"    [facility] EMERGENCY DEPARTMENT ([facility])       3/10/22  History     Chief Complaint   Patient presents with     Nausea & Vomiting     three emesis this morning     Suicidal     The history is provided by the patient and medical records.     Sadaf Ross is a 21 year old female with a past medical history significant for BPD, intellectual disability, and frequent ED visits who presents to the Emergency Department for evaluation of diffuse abdominal pain and vomiting.    Patient reports that she has been experiencing diffuse abdominal pain that started about a year ago.  She describes abdominal pain as a burning sensation whenever she eats which results in her vomiting after eating.  She states it hurts throughout her whole abdomen.  She states that today she had 3 episodes of vomiting and had one episode of vomiting yesterday.  She says that she was seen in this ED yesterday for the same presentation of abdominal pain.  She adds that she was not given any discharge medications at the time of this visit.  She notes that she has a prescription of Zofran for vomiting but is currently out of this prescription.  She reports that she has been seen multiple times for abdominal pain and has been told that her abdominal pain may be secondary to her anxiety.  She endorses that her abdominal pain not being caused by only anxiety.  She says that sometimes she becomes violent due to not being taken seriously.  She has that she gets violent with \"a lot of people\".  Patient denies fever.  Patient denies THC use.  Patient reports tobacco use.    Social history: Patient lives in [place], [place].    Past Medical History:   Diagnosis Date     ADHD (attention deficit hyperactivity disorder)      Bipolar 1 disorder (H)      Borderline personality disorder (H)      Depression      Depressive disorder      Intellectual disability      Obesity      Syncope        Past Surgical History:   Procedure Laterality Date     " APPENDECTOMY       APPENDECTOMY         Family History   Problem Relation Age of Onset     Diabetes Type 1 Father      Cancer Paternal Grandfather        Social History     Tobacco Use     Smoking status: Current Every Day Smoker     Packs/day: 1.00     Years: 5.00     Pack years: 5.00     Types: Cigarettes     Smokeless tobacco: Never Used   Substance Use Topics     Alcohol use: No       No current facility-administered medications for this encounter.     Current Outpatient Medications   Medication     ARIPiprazole ER (ABILIFY MAINTENA) 400 MG extended release inj syringe     benztropine (COGENTIN) 0.5 MG tablet     calcium carbonate (TUMS) 500 MG chewable tablet     chlorhexidine (CHLORHEXIDINE) 0.12 % solution     docusate sodium (COLACE) 100 MG capsule     famotidine (PEPCID) 10 MG tablet     hydrOXYzine (ATARAX) 50 MG tablet     metoclopramide (REGLAN) 10 MG tablet     norgestimate-ethinyl estradiol (SPRINTEC 28) 0.25-35 MG-MCG tablet     OLANZapine (ZYPREXA) 2.5 MG tablet     OLANZapine zydis (ZYPREXA) 5 MG ODT     omeprazole (PRILOSEC) 40 MG DR capsule     ondansetron (ZOFRAN ODT) 4 MG ODT tab     ondansetron (ZOFRAN) 4 MG tablet     polyethylene glycol (MIRALAX) 17 g packet     prochlorperazine (COMPAZINE) 10 MG tablet     promethazine (PHENERGAN) 25 MG suppository     venlafaxine (EFFEXOR-XR) 150 MG 24 hr capsule     Vitamin D, Cholecalciferol, 25 MCG (1000 UT) TABS        Allergies   Allergen Reactions     Penicillins Rash and Unknown        I have reviewed the Medications, Allergies, Past Medical and Surgical History, and Social History in the Epic system.    Review of Systems   Constitutional: Negative for fever.   Eyes: Negative.    Respiratory: Negative for shortness of breath.    Cardiovascular: Negative for chest pain.   Gastrointestinal: Positive for abdominal pain (diffuse), nausea and vomiting.   Genitourinary: Negative for flank pain.   Musculoskeletal: Negative.    Skin: Negative for rash.    Neurological: Negative for headaches.   Psychiatric/Behavioral: Negative.    All other systems reviewed and are negative.      Physical Exam   BP: 131/59  Pulse: 95  Temp: 99.1  F (37.3  C)  Resp: 18  SpO2: 100 %    /59 (BP Location: Right arm)   Pulse 95   Temp 99.1  F (37.3  C) (Oral)   Resp 18   LMP 03/02/2022 (Exact Date)   SpO2 100%    Physical Exam  Physical Exam   Constitutional:   well nourished, well developed, resting comfortably   HENT:   Head: Normocephalic and atraumatic.   Eyes: Conjunctivae are normal. Pupils are equal, round, and reactive to light.    pharynx has no erythema or exudate, mucous membranes are moist  Cardiovascular: regular rate and rhythm without murmurs or gallops  Pulmonary/Chest: Clear to auscultation bilaterally, with no wheezes or retractions. No respiratory distress.  GI: Soft with good bowel sounds.  Obese, diffusely tender,  non-distended, with no guarding, no rebound, no peritoneal signs.   Back:  No bony or CVA tenderness   Musculoskeletal:  no edema  Skin: Skin is warm and dry.  Neurological: alert and oriented to person, place, and time. Nonfocal exam  Psychiatric:  normal mood and affect.     ED Course     At 9:09 AM the patient was seen and examined by Radha Younger MD in Room EDHW01.       Procedures            The medical record was reviewed and interpreted.  Current labs reviewed and interpreted.  Current images reviewed and interpreted: see below.  Managed outpatient prescription medications.         Results for orders placed or performed during the hospital encounter of 03/10/22 (from the past 24 hour(s))   CBC with platelets differential    Narrative    The following orders were created for panel order CBC with platelets differential.  Procedure                               Abnormality         Status                     ---------                               -----------         ------                     CBC with platelets and d...[[id number]]                       Final result                 Please view results for these tests on the individual orders.   Comprehensive metabolic panel   Result Value Ref Range    Sodium 142 133 - 144 mmol/L    Potassium 3.7 3.4 - 5.3 mmol/L    Chloride 114 (H) 94 - 109 mmol/L    Carbon Dioxide (CO2) 21 20 - 32 mmol/L    Anion Gap 7 3 - 14 mmol/L    Urea Nitrogen 10 7 - 30 mg/dL    Creatinine 0.74 0.52 - 1.04 mg/dL    Calcium 10.4 (H) 8.5 - 10.1 mg/dL    Glucose 95 70 - 99 mg/dL    Alkaline Phosphatase 57 40 - 150 U/L    AST 44 0 - 45 U/L     (H) 0 - 50 U/L    Protein Total 8.7 6.8 - 8.8 g/dL    Albumin 4.6 3.4 - 5.0 g/dL    Bilirubin Total 0.3 0.2 - 1.3 mg/dL    GFR Estimate >90 >60 mL/min/1.73m2   Lipase   Result Value Ref Range    Lipase 237 73 - 393 U/L   CBC with platelets and differential   Result Value Ref Range    WBC Count 9.5 4.0 - 11.0 10e3/uL    RBC Count 5.07 3.80 - 5.20 10e6/uL    Hemoglobin 14.3 11.7 - 15.7 g/dL    Hematocrit 42.1 35.0 - 47.0 %    MCV 83 78 - 100 fL    MCH 28.2 26.5 - 33.0 pg    MCHC 34.0 31.5 - 36.5 g/dL    RDW 12.9 10.0 - 15.0 %    Platelet Count 244 150 - 450 10e3/uL    % Neutrophils 66 %    % Lymphocytes 26 %    % Monocytes 4 %    % Eosinophils 2 %    % Basophils 1 %    % Immature Granulocytes 1 %    NRBCs per 100 WBC 0 <1 /100    Absolute Neutrophils 6.4 1.6 - 8.3 10e3/uL    Absolute Lymphocytes 2.4 0.8 - 5.3 10e3/uL    Absolute Monocytes 0.4 0.0 - 1.3 10e3/uL    Absolute Eosinophils 0.2 0.0 - 0.7 10e3/uL    Absolute Basophils 0.1 0.0 - 0.2 10e3/uL    Absolute Immature Granulocytes 0.1 <=0.4 10e3/uL    Absolute NRBCs 0.0 10e3/uL   US Abdomen Limited (RUQ)    Narrative    ULTRASOUND ABDOMEN LIMITED March 10, 2022 1:12 PM     HISTORY: Abdominal pain, elevated ALT.    COMPARISON: CT abdomen/pelvis on 3/1/2022.    FINDINGS:    Gallbladder: No cholelithiasis, gallbladder wall thickening or  pericholecystic fluid. Sonographic Santana's sign is negative.    Bile ducts: CHD is normal diameter. No  intrahepatic biliary  dilatation. The distal aspect of the common bile duct is obscured by  overlying bowel gas.    Liver: It demonstrates increased parenchymal echogenicity. No focal  hepatic mass is visualized.    Pancreas: Partially obscured by overlying bowel gas,  but grossly  unremarkable.     Right kidney: No hydronephrosis or shadowing calculi.    Aorta and IVC: Not specifically assessed.       Impression    IMPRESSION: Hepatic steatosis. No focal hepatic mass is visualized.    MARK HADDAD MD         SYSTEM ID:  [id number]   hCG qual urine POCT   Result Value Ref Range    HCG Qual Urine Negative Negative    Internal QC Check POCT Valid Valid    POCT Kit Lot Number [id number]     POCT Kit Expiration Date 2/28/23      Medications   0.9% sodium chloride BOLUS (0 mLs Intravenous Stopped 3/10/22 1335)   ondansetron (ZOFRAN) injection 4 mg (4 mg Intravenous Given 3/10/22 0954)   ketorolac (TORADOL) injection 30 mg (30 mg Intravenous Given 3/10/22 1041)             Assessments & Plan (with Medical Decision Making)       I have reviewed the nursing notes.  Emergency Department course:  The patient was seen and examined at 0912 am.     Per chart review, patient is a frequent flyer of the ED and has had 27 visits to the ED within the last 3 months.  Most recent visit was yesterday (03/09/2022) at [facility] ED for evaluation of abdominal pain.  During this visitation, patient reported having chronic abdominal pain for about a year.  Patient stated that anxiety is a result of her upcoming EGD. Patient was given Zofran and Ativan which did help with her symptoms.  EKG was reviewed and showed normal sinus rhythm without abnormalities.  hCG lab was negative.  Patient was discharged home and stated that she will follow up Monday (03/14/2022) for EGD concerning her abdominal pain.  CT of the abdomen and pelvis done on March 1 showed no acute cause for her pain demonstrated.      I treated the patient with normal saline bolus  IV and Zofran IV, as well as Toradol IV.  Laboratory studies today show an unremarkable CBC, with a WBC of 9.5.  Lipase is normal at 237.  Comprehensive metabolic panel is essentially unremarkable apart from slightly elevated calcium of 10.4 and an elevated ALT of 107.  The patient did have a CT scan less than 2 weeks ago that was completely unremarkable.  I reevaluated the patient frequently.  She continued to complain of abdominal pain, although she was sound asleep when I went to talk to her at 11:50 AM.  I had to shake her to wake her.  At this point, I elected to obtain a right upper quadrant ultrasound.    RUQ Ultrasound shows IMPRESSION: Hepatic steatosis. No focal hepatic mass is visualized.    Sadaf Ross is a 21 year old female with history of intellectual delay and chronic abdominal pain and multiple prior recent ED visits who presents with vomiting and nausea.  She has essentially unremarkable laboratory studies.  Her CT from less than 2 weeks ago was unremarkable.  Ultrasound today shows hepatic steatosis but no other acute findings.  She is feeling better after IV medications.  I believe she is safe to be discharged home.  She needs to establish a primary care physician with whom to follow-up.  I prescribed Zofran that she can used as needed for nausea and vomiting.      I have reviewed the findings, diagnosis, plan and need for follow up with the patient.    New Prescriptions    ONDANSETRON (ZOFRAN ODT) 4 MG ODT TAB    Take 1 tablet (4 mg) by mouth every 8 hours as needed for nausea or vomiting       Final diagnoses:   Nausea and vomiting, intractability of vomiting not specified, unspecified vomiting type       I, Bonita Means , am serving as a trained medical scribe to document services personally performed by Radha Younger MD, based on the provider's statements to me.      I, Radha Younger MD, was physically present and have reviewed and verified the accuracy of this note documented by Bonita  Miguelangel.   This note was created in part by the use of Dragon voice recognition dictation system. Inadvertent grammatical errors and typographical errors may still exist.  MD Radha Ann MD  3/10/2022   [facility] EMERGENCY DEPARTMENT     Radha Younger MD  03/10/22 1552

## 2022-03-10 NOTE — ED NOTES
[facility] Crisis Assessment for Recurring Patient visits    Patient was assessed: in person  Patient location: [facility] ED      Referral Data and Chief Complaint  Pt is a 21 year old who uses female pronouns. presented to the ED via EMS and was referred to the ED by self. Patient is presenting to the ED for the following concerns: abdominal pain, vomiting and suicidal ideation.      Informed Consent and Assessment Methods  Patient is her own guardian. Writer met with patient and explained the crisis assessment process, including applicable information disclosures and limits to confidentiality, assessed understanding of the process, and obtained consent to proceed with the assessment. Patient was observed to be able to participate in the assessment as evidenced by actively engaged . Assessment methods included conducting a formal interview with patient, review of medical records, collaboration with medical staff, and obtaining relevant collateral information from family and community providers when available.     Summary of Pattern of Presenting Problems  What has been the patient's presentation history for crisis/emergency service, including frequency and prevalance of chief complaint resolution that mitigate arrival life threatening symptom reporting.  Pt is seen frequently in the ED for either physical or mental health complaints or both.  Some days she can present to the ED more than once.  She lives in a group home and is her own guardian.   Pt is often the one who calls 911 on her own behalf as  staff have tried various methods to reduce pts use of the ED.    Today pt states that she threw up red vomit and was worried she was throwing up blood.  She now realizes that she is not throwing up blood and had a red frozen ice earlier in the day.           Significant clinical changes since last assessment    No acute change to her clinical presentation. Pt states that she misses her dad who  last 2021.  She makes  comparisons to her current concerns and how her dad had similar concerns when he was alive. She has passive suicidal thoughts but denies she has a plan or intentions to end her life.      Collateral Information  Review of EMR.      phone call to group home [phone], spoke to Arlene.  She reports no acute changes in pts mood or behaviors. Today she ate something red and then insisted that she was throwing up blood.  She is welcome to return home when cleared by the ED.      Risk Assessment  ESS-6  1.a. Over the past 2 weeks, have you had thoughts of killing yourself? Yes  1.b. Have you ever attempted to kill yourself and, if yes, when did this last happen? Yes numerous non lethal attempts    2. Recent or current suicide plan? No   3. Recent or current intent to act on ideation? No  4. Lifetime psychiatric hospitalization? Yes  5. Pattern of excessive substance use? No  6. Current irritability, agitation, or aggression? No  Scoring note: BOTH 1a and 1b must be yes for it to score 1 point, if both are not yes it is zero. All others are 1 point per number. If all questions 1a/1b - 6 are no, risk is negligible. If one of 1a/1b is yes, then risk is mild. If either question 2 or 3, but not both, is yes, then risk is automatically moderate regardless of total score. If both 2 and 3 are yes, risk is automatically high regardless of total score.      Score: 2, moderate risk      Is the patient a vulnerable adult? Yes     Does the patient engage in non-suicidal self-injurious behavior (NSSI/SIB)? Yes has a history of this      Does the patient have thoughts of harming others? No     Is the patient engaging in sexually inappropriate behavior?  no      Current Substance Abuse  Is there recent substance abuse? no     Was a urine drug screen or blood alcohol level obtained: No     Mental Status Exam     Affect: Appropriate   Appearance: Appropriate    Attention Span/Concentration: Attentive?    Eye Contact: Engaged   Fund of  Knowledge: Appropriate    Language /Speech Content: Fluent   Language /Speech Volume: Normal    Language /Speech Rate/Productions: Normal    Recent Memory: Intact   Remote Memory: Intact   Mood: Normal    Orientation to Person: Yes    Orientation to Place: Yes   Orientation to Time of Day: Yes    Orientation to Date: Yes    Situation (Do they understand why they are here?): Yes    Psychomotor Behavior: Normal    Thought Content: Clear   Thought Form: Intact      History of commitment: No     Medication    Psychotropic medications:   No current facility-administered medications for this encounter.     Current Outpatient Medications   Medication     ARIPiprazole ER (ABILIFY MAINTENA) 400 MG extended release inj syringe     benztropine (COGENTIN) 0.5 MG tablet     calcium carbonate (TUMS) 500 MG chewable tablet     chlorhexidine (CHLORHEXIDINE) 0.12 % solution     docusate sodium (COLACE) 100 MG capsule     famotidine (PEPCID) 10 MG tablet     hydrOXYzine (ATARAX) 50 MG tablet     metoclopramide (REGLAN) 10 MG tablet     norgestimate-ethinyl estradiol (SPRINTEC 28) 0.25-35 MG-MCG tablet     OLANZapine (ZYPREXA) 2.5 MG tablet     OLANZapine zydis (ZYPREXA) 5 MG ODT     omeprazole (PRILOSEC) 40 MG DR capsule     ondansetron (ZOFRAN) 4 MG tablet     polyethylene glycol (MIRALAX) 17 g packet     prochlorperazine (COMPAZINE) 10 MG tablet     promethazine (PHENERGAN) 25 MG suppository     venlafaxine (EFFEXOR-XR) 150 MG 24 hr capsule     Vitamin D, Cholecalciferol, 25 MCG (1000 UT) TABS      Current Care Team  Primary Care Provider: Yes. Name: [facility]. Location: [place]. Date of last visit: unknown. Frequency: unknown. Perceived helpfulness: yes.     Psychiatrist: Yes. Name: Emma Jacobson. Location: [facility]. Date of last visit: unknown. Frequency: unknown. Perceived helpfulness: yes.     Therapist: Yes. Tara with [facility]     : Yes. Name: Jyoti Suarez  Location: [facility] ([phone]). Date of last visit: unknown. Frequency: unknown. Perceived helpfulness: unknown.     [facility] or [facility]: Treva and Viviana        Release of Information  Was a release of information signed: no     Diagnosis    301.83 (F60.3) Borderline Personality Disorder - by history     311 (F32.9) Unspecified Depressive Disorder  - by history        (F70  ) unspecified Intellectual Disabilities - by hx         Disposition  Recommended disposition: Group Home: return to group home        Reviewed case and recommendations with attending provider. Attending Name: Dr. Mayers       Attending concurs with disposition: Yes       Patient concurs with disposition: Yes      Final disposition: Group home: return to group home  .      Clinical Substantiation of Recommendations   Pt does not appear to be an imminent risk to self or others.   She has chronic SI. She lives in a group home where she is closely supervised.   Assessment Details  Patient interview started at: 745p and completed at: 815p.     Total duration spent on the patient case in minutes: 1.0 hrs      CPT code(s) utilized: 02232 - Psychotherapy for Crisis - 60 (30-74*) min            Cherelle Burns MSW, LICSW

## 2022-03-10 NOTE — ED NOTES
EMS at bedside, preparing to transport patient back to group home .Sabine Salinas RN on 3/9/2022 at 10:36 PM

## 2022-03-10 NOTE — ED TRIAGE NOTES
BIBA c/o nausea since waking up this morning. Three instances of emesis this morning. SI with plan to cut.

## 2022-03-10 NOTE — ED NOTES
Bed: ED19  Expected date: 3/10/22  Expected time: 8:55 AM  Means of arrival:   Comments:  [address] 20 y/o N/V

## 2022-03-10 NOTE — DISCHARGE INSTRUCTIONS
If I am feeling unsafe or I am in a crisis, I will:  Contact my established care providers  Call the National Suicide Prevention Lifeline: [phone]  Go to the nearest emergency room  Call 911  Warning signs that I or other people might notice when a crisis is  developing for me: I am feeling suicidal and I have tried my coping  skills and talked to staff and nothing is helping before I call 911.  Things I am able to do on my own to cope or help me feel better:  Go for walks. Listening to my old school country music  Things that I am able to do with others to cope or help me better:  Talk with staff or my peers helps.  Things I can use or do for distraction: Reading or watching a good  movie. Going for walks.  Changes I can make to support my mental health and wellness:  Learn and use coping skills before calling 911. Us 911 only if necessary.  People in my life that I can ask for help: Group home staff. Peers.  Your county has a mental health crisis team you can call 24/7:  [facility] Crisis: Adult [phone]  Child [phone]  Other things that are important when I m in crisis: Do not call 911  unless it is serious and unmanageable. I am not alone. I have skills  and people that can help.

## 2022-03-10 NOTE — ED NOTES
Bed: [facility]  Expected date:   Expected time:   Means of arrival:   Comments:  [place] [id number], 21 year old female, spit up some red stuff, green 15 min

## 2022-03-10 NOTE — DISCHARGE INSTRUCTIONS
Drink small amounts of fluid frequently.  Use Zofran as needed for nausea and vomiting.  Follow-up with your regular doctor for reevaluation in 1 to 2 weeks.  Your CT scan from March 1 was normal.  Your ultrasound today does not show any acute findings.

## 2022-03-10 NOTE — ED PROVIDER NOTES
[facility] [place] ([facility])    3/09/22      History     Chief Complaint   Patient presents with     Nausea & Vomiting     Paramedics state that she is here for vomiting after having a slushee     Suicidal     HPI  Sadaf Ross is a 21 year old female with a past medical history significant for borderline personality disorder, bipolar 1 disorder, depressive disorder, and developmental delay who presents to the emergency department via EMS for an evaluation of nausea and vomiting and suicidal ideation.  Patient is well-known to the ED due to previous visits.  She complains of 1 year of generalized abdominal pain and notes that she is being followed for this.  She notes that this is also why she is here today along with suicidal thoughts.  She notes she vomited twice yesterday but not today.  Normal nonbloody stools.  No urinary symptoms.  Monthly menses.  No unusual vaginal discharge.  No worsening of her regular belly pain.  She also notes that she has suicidal ideation, passive, chronically and that it is not worse today.  She denies fever.    Past Medical History  Past Medical History:   Diagnosis Date     ADHD (attention deficit hyperactivity disorder)      Bipolar 1 disorder (H)      Borderline personality disorder (H)      Depression      Depressive disorder      Intellectual disability      Obesity      Syncope      Past Surgical History:   Procedure Laterality Date     APPENDECTOMY       APPENDECTOMY       ARIPiprazole ER (ABILIFY MAINTENA) 400 MG extended release inj syringe  benztropine (COGENTIN) 0.5 MG tablet  calcium carbonate (TUMS) 500 MG chewable tablet  chlorhexidine (CHLORHEXIDINE) 0.12 % solution  docusate sodium (COLACE) 100 MG capsule  famotidine (PEPCID) 10 MG tablet  hydrOXYzine (ATARAX) 50 MG tablet  metoclopramide (REGLAN) 10 MG tablet  norgestimate-ethinyl estradiol (SPRINTEC 28) 0.25-35 MG-MCG tablet  OLANZapine (ZYPREXA) 2.5 MG tablet  OLANZapine zydis  (ZYPREXA) 5 MG ODT  omeprazole (PRILOSEC) 40 MG DR capsule  ondansetron (ZOFRAN) 4 MG tablet  polyethylene glycol (MIRALAX) 17 g packet  prochlorperazine (COMPAZINE) 10 MG tablet  promethazine (PHENERGAN) 25 MG suppository  venlafaxine (EFFEXOR-XR) 150 MG 24 hr capsule  Vitamin D, Cholecalciferol, 25 MCG (1000 UT) TABS      Allergies   Allergen Reactions     Penicillins Rash and Unknown     Family History  Family History   Problem Relation Age of Onset     Diabetes Type 1 Father      Cancer Paternal Grandfather      Social History   Social History     Tobacco Use     Smoking status: Current Every Day Smoker     Packs/day: 1.00     Years: 5.00     Pack years: 5.00     Types: Cigarettes     Smokeless tobacco: Never Used   Substance Use Topics     Alcohol use: No     Drug use: No      Past medical history, past surgical history, medications, allergies, family history, and social history were reviewed with the patient. No additional pertinent items.       Review of Systems  A complete review of systems was performed with pertinent positives and negatives noted in the HPI, and all other systems negative.    Physical Exam   BP: 112/78  Pulse: 89  Temp: 97.5  F (36.4  C)  Resp: 16  Weight: 105.2 kg (232 lb)  SpO2: 99 %  Physical Exam  Vitals and nursing note reviewed.   Constitutional:       General: She is not in acute distress.     Appearance: She is not ill-appearing, toxic-appearing or diaphoretic.   HENT:      Head: Atraumatic.      Mouth/Throat:      Mouth: Mucous membranes are moist.   Eyes:      General: No scleral icterus.     Pupils: Pupils are equal, round, and reactive to light.   Cardiovascular:      Rate and Rhythm: Normal rate and regular rhythm.   Pulmonary:      Effort: Pulmonary effort is normal. No respiratory distress.      Breath sounds: Normal breath sounds.   Abdominal:      General: Abdomen is flat. There is no distension.      Palpations: Abdomen is soft.      Tenderness: There is no abdominal  tenderness. There is no right CVA tenderness, left CVA tenderness, guarding or rebound.   Musculoskeletal:         General: No tenderness.      Cervical back: No tenderness.   Skin:     General: Skin is warm and dry.   Neurological:      General: No focal deficit present.      Mental Status: She is alert.   Psychiatric:         Mood and Affect: Mood normal.         ED Course      Procedures         Mental Health Risk Assessment      PSS-3    Date and Time Over the past 2 weeks have you felt down, depressed, or hopeless? Over the past 2 weeks have you had thoughts of killing yourself? Have you ever attempted to kill yourself? When did this last happen? User   03/09/22 1833 yes yes yes more than 6 months ago KAM      C-SSRS ([place])    Date and Time Q1 Wished to be Dead (Past Month) Q2 Suicidal Thoughts (Past Month) Q3 Suicidal Thought Method Q4 Suicidal Intent without Specific Plan Q5 Suicide Intent with Specific Plan Q6 Suicide Behavior (Lifetime) Within the Past 3 Months? RETIRED: Level of Risk per Screen Screening Not Complete User   03/09/22 1833 yes yes yes yes yes yes -- -- -- KAM              Suicide assessment completed by mental health (D.E.C., LCSW, etc.)       Results for orders placed or performed during the hospital encounter of 03/09/22   HCG qualitative urine (UPT)     Status: Normal   Result Value Ref Range    hCG Urine Qualitative Negative Negative     Medications - No data to display     Assessments & Plan (with Medical Decision Making)   Well-appearing patient well-known to the ER from numerous and frequent visits for daily suicidal ideation, passive, and chronic abdominal pain.  On exam she is alert and interactive and in good humor, hemodynamically stable, in no acute distress, nontoxic, afebrile.  Belly is soft and nontender, without peritoneal signs.  The DEC  evaluated the patient and feels she is safe to discharge to her group home.  The patient currently denies any active plan for  self-harm or harm of others.  She would like to go home.  She understand strict return precautions to the ER.    I have reviewed the nursing notes. I have reviewed the findings, diagnosis, plan and need for follow up with the patient.    New Prescriptions    No medications on file       Final diagnoses:   Abdominal pain, generalized       --  Vanda Mayers MD  [facility] EMERGENCY DEPARTMENT  3/9/2022     Vanda Mayers MD  03/09/22 5800

## 2022-03-10 NOTE — ED NOTES
Spoke with Arlene at [facility], [phone] and advised her the patient would be returning to their group home soon. Accepting of patient.

## 2022-03-10 NOTE — ED NOTES
EZEQUIEL Jacobs from [facility] notified that patient will be discharging and transferred back to [facility] home via ambulance. Sabine Salinas RN on 3/9/2022 at 9:29 PM

## 2022-03-12 ENCOUNTER — HOSPITAL ENCOUNTER (EMERGENCY)
Facility: CLINIC | Age: 22
Discharge: HOME | End: 2022-03-12
Attending: EMERGENCY MEDICINE | Admitting: EMERGENCY MEDICINE
Payer: MEDICARE

## 2022-03-12 VITALS
RESPIRATION RATE: 16 BRPM | TEMPERATURE: 97.2 F | DIASTOLIC BLOOD PRESSURE: 73 MMHG | SYSTOLIC BLOOD PRESSURE: 123 MMHG | OXYGEN SATURATION: 96 % | HEART RATE: 76 BPM

## 2022-03-12 DIAGNOSIS — F79 INTELLECTUAL DISABILITY: ICD-10-CM

## 2022-03-12 DIAGNOSIS — G89.29 CHRONIC ABDOMINAL PAIN: ICD-10-CM

## 2022-03-12 DIAGNOSIS — R10.9 CHRONIC ABDOMINAL PAIN: ICD-10-CM

## 2022-03-12 DIAGNOSIS — F60.3 BORDERLINE PERSONALITY DISORDER (H): ICD-10-CM

## 2022-03-12 LAB — BACTERIA UR CULT: NORMAL

## 2022-03-12 PROCEDURE — 250N000013 HC RX MED GY IP 250 OP 250 PS 637: Performed by: EMERGENCY MEDICINE

## 2022-03-12 PROCEDURE — 90791 PSYCH DIAGNOSTIC EVALUATION: CPT

## 2022-03-12 PROCEDURE — 93010 ELECTROCARDIOGRAM REPORT: CPT | Performed by: EMERGENCY MEDICINE

## 2022-03-12 PROCEDURE — 99285 EMERGENCY DEPT VISIT HI MDM: CPT | Mod: 25 | Performed by: EMERGENCY MEDICINE

## 2022-03-12 PROCEDURE — 93005 ELECTROCARDIOGRAM TRACING: CPT | Performed by: EMERGENCY MEDICINE

## 2022-03-12 RX ORDER — OLANZAPINE 10 MG/1
10 TABLET, ORALLY DISINTEGRATING ORAL ONCE
Status: COMPLETED | OUTPATIENT
Start: 2022-03-12 | End: 2022-03-12

## 2022-03-12 RX ADMIN — OLANZAPINE 10 MG: 10 TABLET, ORALLY DISINTEGRATING ORAL at 16:20

## 2022-03-12 ASSESSMENT — ENCOUNTER SYMPTOMS
FEVER: 0
ABDOMINAL PAIN: 1
VOMITING: 0
SHORTNESS OF BREATH: 0

## 2022-03-12 NOTE — ED NOTES
Pt moved on the floor and laying on side of chair, she started to bang her head slightly on wall again.  Pt was asked to move off floor and onto bed and she began yelling again.

## 2022-03-12 NOTE — ED NOTES
[facility] Crisis Assessment for Recurring Patient visits    Patient was assessed: in person  Patient location: [facility] Emergency Department        Referral Data and Chief Complaint  Sadaf is a 21year old who uses she/her pronouns. presented to the ED via EMS and was referred to the ED by self. Patient is presenting to the ED for the following concerns: abdominal, chest pain, and suicidal and homicidal thoughts.      Informed Consent and Assessment Methods     Patient is her own guardian. Patient would not engage in assessment so informed consent and assessment methods were not reviewed with her.     Summary of Pattern of Presenting Problems  What has been the patient's presentation history for crisis/emergency service, including frequency and prevalance of chief complaint resolution that mitigate arrival life threatening symptom reporting.     She has the following mental health diagnosis.    ADHD (attention deficit hyperactivity disorder)      Bipolar 1 disorder (H)      Borderline personality disorder (H)      Depression      Depressive disorder      Intellectual disability      Obesity      Syncope       Patient has almost daily Ed visits, her most recent one was two days ago for which she was medically cleared and discharged. This is her 28th ED visit this calander year. There are no acute findings for her pain issues. She was also seen the day before with similar complaints and had a DEC assessment by Mai Burns and appeared at her baseline. She presents with chronic SI/HI and behavioral issues. She often asks for a 1:1 and acknowledges she likes the attention she receives in the ED. It is recommended that she does not receive extra attention, beyond safety issues, as this is re in forcing her behaviors. She was initially calm and cooperative sitting in the hallway. After meeting with MD and being uncooperative to the point where she was told she would be discharged, patient became  "upset and a code 21 was called.     Attempted to meet patient. She was in the room, on the floor, banging her head on the wall. Security and nurses were working with her. She had then moved into chair. When writer introduced self to her and that the MD ordered an assessment she stated \"get the fuck out of here or I will punch you.\" She had been given 10 mg of oral Zyprexa about 20 prior to the encounter. I informed the patient that if she does not want to engage in an assessment we will discharge her back to the group home. I also asked the RN to let her know we are available to come back if she changes her mind.     Patient has an extensive care team in place to address ongoing issues: patient has an [facility] worker and medication management through [facility]  Jyoti @ [phone], and a PCP through [facility]     Per her group home staff she was complaining about chest pain thinking she was having a heart attack prior to calling 911. She did not endorse any SI/HI to  staff.       Significant clinical changes since last assessment   Duration of the current crisis, coping skills attempted to reduce the crisis and community resources used.     There are no acute changes since her last assessment three days ago on 3/9/22 and prior to then on 3/4/22. She presents as her baseline in the emergency room with emotional dysregulation, chronic SI/HI statements, and self injurious behaviors. Her group staff report she was not endorsing any suicidal statements or mental health concerns prior to calling 911.      Collateral Information    Patients group home staff Edvin was contacted for collateral information. [phone]. She did not have much to add, other than she was complaining of chest pain and was not endorsing SI/HI prior. She has been at her baseline behavioral wise. She provided the  nurse information of Arlene,  Patient has been compliant with her medications. When asked about any " care plan the  has put in place regarding patients frequent ED visits and she said there was but was unable to identify that plan.      Risk Assessment     ESS-6  1.a. Over the past 2 weeks, have you had thoughts of killing yourself? Yes  1.b. Have you ever attempted to kill yourself and, if yes, when did this last happen? No   2. Recent or current suicide plan? No   3. Recent or current intent to act on ideation? No  4. Lifetime psychiatric hospitalization? Yes  5. Pattern of excessive substance use? No  6. Current irritability, agitation, or aggression? Yes  Scoring note: BOTH 1a and 1b must be yes for it to score 1 point, if both are not yes it is zero. All others are 1 point per number. If all questions 1a/1b - 6 are no, risk is negligible. If one of 1a/1b is yes, then risk is mild. If either question 2 or 3, but not both, is yes, then risk is automatically moderate regardless of total score. If both 2 and 3 are yes, risk is automatically high regardless of total score.      Score: 4, moderate risk      Is the patient a vulnerable adult? Yes     Does the patient engage in non-suicidal self-injurious behavior (NSSI/SIB)? yes. Method:Headbanging, scratching self.  Frequency:frequently Duration:NA History: Chronic behavior.      Does the patient have thoughts of harming others? No told the nurse she wants to punch and cut up someone. She threatened to punch  today.      Is the patient engaging in sexually inappropriate behavior?  no      Current Substance Abuse     Is there recent substance abuse? no     Was a urine drug screen or blood alcohol level obtained: No     Mental Status Exam - Patient would not participate in assessment. The following information was based on observation and collateral information.     Affect: Dramatic and Labile   Appearance: Disheveled    Attention Span/Concentration: Inattentive?    Eye Contact: Avoidant   Fund of Knowledge: Appropriate    Language /Speech Content: Fluent    Language /Speech Volume: Loud    Language /Speech Rate/Productions: Normal    Recent Memory: Intact   Remote Memory: Intact   Mood: Angry    Orientation to Person: Yes    Orientation to Place: Yes   Orientation to Time of Day: Yes    Orientation to Date: Yes    Situation (Do they understand why they are here?): Yes    Psychomotor Behavior: Agitated    Thought Content: Homicidal and Suicidal   Thought Form: Intact      History of commitment: No     Medication    Psychotropic medications:   Current Facility-Administered Medications   Medication     OLANZapine zydis (zyPREXA) ODT tab 10 mg     Current Outpatient Medications   Medication     ARIPiprazole ER (ABILIFY MAINTENA) 400 MG extended release inj syringe     benztropine (COGENTIN) 0.5 MG tablet     calcium carbonate (TUMS) 500 MG chewable tablet     chlorhexidine (CHLORHEXIDINE) 0.12 % solution     docusate sodium (COLACE) 100 MG capsule     hydrOXYzine (ATARAX) 50 MG tablet     metoclopramide (REGLAN) 10 MG tablet     norgestimate-ethinyl estradiol (SPRINTEC 28) 0.25-35 MG-MCG tablet     OLANZapine (ZYPREXA) 2.5 MG tablet     OLANZapine zydis (ZYPREXA) 5 MG ODT     omeprazole (PRILOSEC) 40 MG DR capsule     ondansetron (ZOFRAN ODT) 4 MG ODT tab     ondansetron (ZOFRAN) 4 MG tablet     polyethylene glycol (MIRALAX) 17 g packet     prochlorperazine (COMPAZINE) 10 MG tablet     promethazine (PHENERGAN) 25 MG suppository     venlafaxine (EFFEXOR-XR) 150 MG 24 hr capsule     Vitamin D, Cholecalciferol, 25 MCG (1000 UT) TABS        Current Care Team  Primary Care Provider:   [facility]  [address], [place] [id number] [phone]     Psychiatrist:      Emma Jacobson.   [facility],    [address], [address]  [place] [address] [phone]    Therapist:   Tara   [facility]  [address],   [place] [address] [phone]     :     Jyoti Malik.   [facility]    ([phone]).      [facility] or [facility]: [facility]    Group Home [phone]      Release of Information    Was a release of information signed: No. Reason: Patient refused to engage to discuss. A copy will be placed on her chart if she agrees to sign before she discharges.       Diagnosis    301.83 (F60.3) Borderline Personality Disorder - by history     311 (F32.9) Unspecified Depressive Disorder  - by history     Intellectual Disabilites  317 (F70) Mild - by history      Disposition    Recommended disposition: Group Home: Return to group home and continue to work with her establshed providers. Involve behavioral anyalyst and team members on care plans due to frequent ED visits and emotional dysregulation.   While in the emergency center maintain safety with medical and chemical restraints as necessary. Limit the amount of engagement and discussion of symptoms and triggering events to avoid positive re enforcement of her mal adaptive behaviors. Provide selective positive validation when appropriate.      Reviewed case and recommendations with attending provider. Attending Name:        Attending concurs with disposition: Yes       Patient concurs with disposition: No: patient very dysregulated at this time.        Guardian concurs with disposition: NA      Final disposition: Group home: return to group home when mentally calm.  .      Clinical Substantiation of Recommendations   Patient presents to the emergency department with multiple somatic complaints and HI and SI. She appears at her baseline based on history and with no acute changes.   Assessment Details    Patient interview started at: 4 pm with collateral from group [place] and completed at: 4:30 when patient refused to engage in an assessment.     Total duration spent on the patient case in minutes: .50 hrs      CPT code(s) utilized: 54026 - Psychotherapy for Crisis - 60 (30-74*) min       ZEESHAN Kohler

## 2022-03-12 NOTE — ED PROVIDER NOTES
"      [facility] EMERGENCY DEPARTMENT ([facility])    3/12/22          History     Chief Complaint   Patient presents with     Suicidal     Pt said  she had plan to cut ad punch people. Pt also complains of abdominal pain and chest pain.      HPI  Sadaf Ross is a 21 year old female with a past medical history significant for borderline personality disorder, bipolar 1 disorder, depressive disorder, and developmental delay as well as chronic abdominal pain who presents to the ED via EMS for evaluation of suicidal ideation.  Patient states that she is having suicidal thoughts.  She states she had a plan to \"cut and punch\" people.  Also complains of abdominal and chest pain. Nurses' notes state that she came to [place] for 1 to 1 monitoring.     Past Medical History  Past Medical History:   Diagnosis Date     ADHD (attention deficit hyperactivity disorder)      Bipolar 1 disorder (H)      Borderline personality disorder (H)      Depression      Depressive disorder      Intellectual disability      Obesity      Syncope      Past Surgical History:   Procedure Laterality Date     APPENDECTOMY       APPENDECTOMY       ARIPiprazole ER (ABILIFY MAINTENA) 400 MG extended release inj syringe  benztropine (COGENTIN) 0.5 MG tablet  calcium carbonate (TUMS) 500 MG chewable tablet  chlorhexidine (CHLORHEXIDINE) 0.12 % solution  docusate sodium (COLACE) 100 MG capsule  hydrOXYzine (ATARAX) 50 MG tablet  metoclopramide (REGLAN) 10 MG tablet  norgestimate-ethinyl estradiol (SPRINTEC 28) 0.25-35 MG-MCG tablet  OLANZapine (ZYPREXA) 2.5 MG tablet  OLANZapine zydis (ZYPREXA) 5 MG ODT  omeprazole (PRILOSEC) 40 MG DR capsule  ondansetron (ZOFRAN ODT) 4 MG ODT tab  ondansetron (ZOFRAN) 4 MG tablet  polyethylene glycol (MIRALAX) 17 g packet  prochlorperazine (COMPAZINE) 10 MG tablet  promethazine (PHENERGAN) 25 MG suppository  venlafaxine (EFFEXOR-XR) 150 MG 24 hr capsule  Vitamin D, Cholecalciferol, 25 MCG (1000 UT) " TABS      Allergies   Allergen Reactions     Penicillins Rash and Unknown     Family History  Family History   Problem Relation Age of Onset     Diabetes Type 1 Father      Cancer Paternal Grandfather      Social History   Social History     Tobacco Use     Smoking status: Current Every Day Smoker     Packs/day: 1.00     Years: 5.00     Pack years: 5.00     Types: Cigarettes     Smokeless tobacco: Never Used   Substance Use Topics     Alcohol use: No     Drug use: No      Past medical history, past surgical history, medications, allergies, family history, and social history were reviewed with the patient. No additional pertinent items.       Review of Systems   Constitutional: Negative for fever.   Respiratory: Negative for shortness of breath.    Cardiovascular: Positive for chest pain.   Gastrointestinal: Positive for abdominal pain. Negative for vomiting.   Psychiatric/Behavioral: Positive for suicidal ideas.   All other systems reviewed and are negative.        Physical Exam   BP: 123/79  Pulse: 98  Temp: 98.2  F (36.8  C)  Resp: 16  SpO2: 100 %  Physical Exam  Physical Exam   Constitutional:   well nourished, well developed, patient lying on her stomach in room 16C and initially refusing to look at me  HENT:   Head: Normocephalic and atraumatic.   Eyes: Conjunctivae are normal. Pupils are equal, round, and reactive to light.   Cardiovascular: regular rate and rhythm without murmurs or gallops  Pulmonary/Chest: Clear to auscultation bilaterally, with no wheezes or retractions. No respiratory distress.  GI: Soft with good bowel sounds.  Diffusely tender, non-distended, with no guarding, no rebound, no peritoneal signs.   Back:  No bony or CVA tenderness     Skin: Skin is warm and dry. No rash noted.   Neurological: alert and oriented to person, place, and time. Nonfocal exam  Psychiatric:  Speech is normal. Judgment and thought content impaired. mood appears flat. Patient is agitated, expresses suicidal  ideation.  ED Course     4:00 PM  The patient was seen and examined by Radha Younger MD in Room ED16C.     Procedures       The medical record was reviewed and interpreted.  Current labs reviewed and interpreted.  Previous labs reviewed and interpreted.  Previous images reviewed and interpreted: see below.  EKG reviewed and interpreted: see below.         EKG Interpretation:      Interpreted by Radha Younger MD  Time reviewed:1650 pm   Symptoms at time of EKG: see hpi   Rhythm: Normal sinus   Rate: Normal  Axis: Normal  Ectopy: None  Conduction: Normal  ST Segments/ T Waves: No ST-T wave changes, No acute ischemic changes and Poor R wave progression  Q Waves: None  Comparison to prior: Unchanged from 2/26/2022    Clinical Impression: Normal sinus rhythm, rate of 85 bpm with poor R wave progression and no acute ischemic changes        Results for orders placed or performed during the hospital encounter of 03/12/22   EKG 12-lead, tracing only     Status: None (Preliminary result)   Result Value Ref Range    Systolic Blood Pressure  mmHg    Diastolic Blood Pressure  mmHg    Ventricular Rate 85 BPM    Atrial Rate 85 BPM    KY Interval 168 ms    QRS Duration 86 ms     ms    QTc 423 ms    P Axis 46 degrees    R AXIS 35 degrees    T Axis 9 degrees    Interpretation ECG       Sinus rhythm  Cannot rule out Anterior infarct , age undetermined  Abnormal ECG       Medications   OLANZapine zydis (zyPREXA) ODT tab 10 mg (10 mg Oral Given 3/12/22 1620)        Assessments & Plan (with Medical Decision Making)       I have reviewed the nursing notes.   Emergency Department course:  The patient was seen and examined at 1556 pm in room 16C.   Chart review shows: Patient is a frequent flyer of the ED and today's visit is the patient's seventh visit in the month of March alone. Per chart review, patient was seen at [facility] 3/10/2022 by Dr. Younger (me) for evaluation of diffuse abdominal pain and vomiting.   Patient  was treated with normal saline bolus IV and Zofran IV as well as Toradol IV.  Laboratory studies were unremarkable with CBC unremarkable, WBC  9.5, lipase 237.  Comprehensive metabolic panel essentially unremarkable with slightly elevated calcium at 10.4 and elevated ALT of 107.  CT from 2 weeks ago was unremarkable.  Ultrasound 3/10/2022 was unremarkable with the exception of hepatic steatosis.    CT ABDOMEN PELVIS WITH CONTRAST 3/1/2022   IMPRESSION: No acute cause for pain demonstrated.    ULTRASOUND ABDOMEN LIMITED March 10, 2022   IMPRESSION: Hepatic steatosis. No focal hepatic mass is visualized.     The patient was initially very reluctant to speak with me.  She would not even look at me.  She then states she was suicidal.  Shortly after I spoke with her, she became quite agitated.  A code green was called.  The patient refused oral Zyprexa.  A code 21 was called and the patient then took oral Zyprexa.    I requested a BEC evaluation on the patient.  She did have a recent evaluation by the behavioral  on March 9 for suicidal ideation.  BEC  Amira attempted to see the patient at approximately 1635 pm.  The patient refused assessment.  She was banging her head on the wall and threatening to punch Amira.    The patient did have a full BEC evaluation on March 9, 3 days ago.  She has chronic suicidal ideation and lives in a group home.  She presents to the emergency department frequently for similar complaints.    Plan at this time is to move the patient to room 14 and limit interaction with her.  The patient frequently wants additional attention and people to pay attention to her..  Plan is to discharge her back to the group home later this evening.    I did obtain an EKG on the patient that shows normal sinus rhythm, rate of 85 bpm with poor R wave progression, no acute ischemic changes.  This is unchanged from EKG done at the end of February 2022.    Sadaf Ross is a 21 year old female  with a history of intellectual delay, borderline personality disorder, chronic suicidal ideation and chronic abdominal and chest pain.  She has had several recent ED visits with full laboratory and radiographic evaluations.   She will be observed in the ED until later this evening and be discharged home to her group home.  She was signed out to Dr. Schwarz at approximately 1615 pm for reassessment.   I have reviewed the findings, diagnosis, plan and need for follow up with the patient.    New Prescriptions    No medications on file       Final diagnoses:   Borderline personality disorder (H)   Chronic abdominal pain   Intellectual disability     I, Rere Rea, am serving as a trained medical scribe to document services personally performed by Radha Younger MD based on the provider's statements to me on March 12, 2022.  This document has been checked and approved by the attending provider.    I, Radha Younger MD, was physically present and have reviewed and verified the accuracy of this note documented by Rere Rea, medical scribe.    This note was created in part by the use of Dragon voice recognition dictation system. Inadvertent grammatical errors and typographical errors may still exist.  MD Radha Ann MD      --  Radha CANO [facility] ED EMERGENCY DEPARTMENT  3/12/2022     Radha Younger MD  03/12/22 [id number]

## 2022-03-12 NOTE — ED NOTES
Bed: HW02  Expected date: 3/12/22  Expected time: 2:13 PM  Means of arrival:   Comments:  727, 20 yo F SI, anxiety

## 2022-03-12 NOTE — ED TRIAGE NOTES
Pt is from the group home. Pt is complaining about abdominal pain, chest pain, and suicidal ideation.Vs stable. Per EMS, pt is looking for ativan and 1:1 monitoring.

## 2022-03-12 NOTE — ED NOTES
Pt up in room head banging in corner and yelling, responded to psych associates attempt at de-escalation but then started banging head and yelling again.  Code 21 called and after discussion patient was agreeable to take oral zyprexa.  MD informed.

## 2022-03-13 ENCOUNTER — NURSE TRIAGE (OUTPATIENT)
Dept: NURSING | Facility: CLINIC | Age: 22
End: 2022-03-13
Payer: MEDICARE

## 2022-03-13 ENCOUNTER — HOSPITAL ENCOUNTER (EMERGENCY)
Facility: CLINIC | Age: 22
Discharge: HOME | End: 2022-03-13
Attending: FAMILY MEDICINE | Admitting: FAMILY MEDICINE
Payer: MEDICARE

## 2022-03-13 VITALS
OXYGEN SATURATION: 97 % | HEART RATE: 111 BPM | DIASTOLIC BLOOD PRESSURE: 88 MMHG | RESPIRATION RATE: 18 BRPM | TEMPERATURE: 97.7 F | SYSTOLIC BLOOD PRESSURE: 140 MMHG

## 2022-03-13 DIAGNOSIS — F79 INTELLECTUAL DISABILITY: Chronic | ICD-10-CM

## 2022-03-13 DIAGNOSIS — F60.3 BORDERLINE PERSONALITY DISORDER (H): Chronic | ICD-10-CM

## 2022-03-13 DIAGNOSIS — F31.32 BIPOLAR AFFECTIVE DISORDER, CURRENTLY DEPRESSED, MODERATE (H): ICD-10-CM

## 2022-03-13 LAB
ATRIAL RATE - MUSE: 85 BPM
DIASTOLIC BLOOD PRESSURE - MUSE: NORMAL MMHG
INTERPRETATION ECG - MUSE: NORMAL
P AXIS - MUSE: 46 DEGREES
PR INTERVAL - MUSE: 168 MS
QRS DURATION - MUSE: 86 MS
QT - MUSE: 356 MS
QTC - MUSE: 423 MS
R AXIS - MUSE: 35 DEGREES
SYSTOLIC BLOOD PRESSURE - MUSE: NORMAL MMHG
T AXIS - MUSE: 9 DEGREES
VENTRICULAR RATE- MUSE: 85 BPM

## 2022-03-13 PROCEDURE — 250N000013 HC RX MED GY IP 250 OP 250 PS 637: Performed by: FAMILY MEDICINE

## 2022-03-13 PROCEDURE — 90791 PSYCH DIAGNOSTIC EVALUATION: CPT

## 2022-03-13 PROCEDURE — 99285 EMERGENCY DEPT VISIT HI MDM: CPT | Mod: 25 | Performed by: FAMILY MEDICINE

## 2022-03-13 PROCEDURE — 99284 EMERGENCY DEPT VISIT MOD MDM: CPT | Performed by: FAMILY MEDICINE

## 2022-03-13 RX ORDER — OLANZAPINE 10 MG/1
10 TABLET, ORALLY DISINTEGRATING ORAL ONCE
Status: COMPLETED | OUTPATIENT
Start: 2022-03-13 | End: 2022-03-13

## 2022-03-13 RX ADMIN — OLANZAPINE 10 MG: 10 TABLET, ORALLY DISINTEGRATING ORAL at 20:39

## 2022-03-13 NOTE — ED NOTES
Group Home called and updated on pt. return.  Willing to accept pt. back.  Ambulance called for transport.

## 2022-03-14 ENCOUNTER — NURSE TRIAGE (OUTPATIENT)
Dept: NURSING | Facility: CLINIC | Age: 22
End: 2022-03-14
Payer: MEDICARE

## 2022-03-14 NOTE — ED NOTES
3/13/2022  Sadaf Ross 2000     [facility] Crisis Assessment    Patient was assessed: in person  Patient location: [facility]    Referral Data and Chief Complaint  Sadaf Ross is a 21 year old who uses she/her pronouns. Patient presented to the ED via EMS and was referred to the ED by community provider(s). Patient is presenting to the ED for the following concerns: aggressive behavior and suicidal risk.      Informed Consent and Assessment Methods    Patient is her own guardian. Writer met with patient and explained the crisis assessment process, including applicable information disclosures and limits to confidentiality, assessed understanding of the process, and obtained consent to proceed with the assessment. Patient was observed to be able to participate in the assessment as evidenced by participation. Assessment methods included conducting a formal interview with patient, review of medical records, collaboration with medical staff, and obtaining relevant collateral information from family and community providers when available.    Narrative Summary of Presenting Problem and Current Functioning  What led to the patient presenting for crisis services, factors that make the crisis life threatening or complex, stressors, how is this disrupting the patient's life, and how current functioning is in comparison to baseline. How is patient presenting during the assessment.     Patient presented to [facility] ED via EMS for concerns regarding aggressive behavior and suicidal threats. Patient was at her group home tonight and acquired a knife unknown to group home staff, and according to group home staff, threatened them,`as well as herself. Group home was called and they say she can go out and purchase them at will, and they cannot find them when they search for them. Communication with the group home showed patient calling 911 five times today, making suicidal comments, but did not hurt herself. Patient also stated that  she flushed all of her current medications down the toilet today, having stopped taking them yesterday. During this assessment, patient insisted she only threatened herself, and not staff, and was in tears, feeling remorseful, fearing she had burned her bridges. She also stated that she would prefer to live on the street than to go to another group home. Patient was apparently so dysregulated when the police arrive, she required handcuffs to remain safe. Patient was crying during part of this assessment, and just prior, had behaviorally coded in the ED, requiring a rapid response. Patient did regulate and was able to participate in the assessment, eventually requesting toreturn to her group home. Home was contacted and advised that patient would be returning as she was currently behaviorally and emotionally stabilized.       History of the Crisis  Duration of the current crisis, coping skills attempted to reduce the crisis, community resources used, and past presentations.    Patient has presented to [facility] ED every day for the past 5 days. This is currently her 29th presentation to [facility] ED this year, not including presentations to other emergency rooms as well. During this assessment, patient said she had called her sister, Sofia to request her assistance in finding another placement, preferably with her. Sister was called and confirmed this, and will address the issue, with tomorrow being Monday. Patient saw her therapist, Tara on Tuesday this past week, and has her [facility] worked coming tomorrow to the group home. Patient remains her own guardian, has a therapeutic team in place and currently working with her. Group home staff was called at [phone] to advise patient was discharged and returning to the group home.     Collateral Information  Sister: Sofia at [phone] (same number as mother, Yarely)  Group home staff at [phone]    Risk Assessment    Risk of Harm to Self     ESS-6  1.a. Over the  past 2 weeks, have you had thoughts of killing yourself? Yes  1.b. Have you ever attempted to kill yourself and, if yes, when did this last happen? No   2. Recent or current suicide plan? No   3. Recent or current intent to act on ideation? No  4. Lifetime psychiatric hospitalization? Yes  5. Pattern of excessive substance use? No  6. Current irritability, agitation, or aggression? Yes  Scoring note: BOTH 1a and 1b must be yes for it to score 1 point, if both are not yes it is zero. All others are 1 point per number. If all questions 1a/1b - 6 are no, risk is negligible. If one of 1a/1b is yes, then risk is mild. If either question 2 or 3, but not both, is yes, then risk is automatically moderate regardless of total score. If both 2 and 3 are yes, risk is automatically high regardless of total score.     Score: 2, moderate risk    The patient has the following risk factors for suicide: depressive symptoms, poor decision making, poor impulse control, preoccupied with death/dying and restless/agitated    Is the patient experiencing current suicidal ideation: Yes. Thoughts to kill self with no plan or intent    Is the patient engaging in preparatory suicide behaviors (formulating how to act on plan, giving away possessions, saying goodbye, displaying dramatic behavior changes, etc)? No    Does the patient have access to firearms or other lethal means? yes, lethal means counseling was provided and the following plan for risk mitigation was discussed: group home has been instructed to remove all sharps from access by patient as a result of previous similar issues.  Group [place] is aware of patient behaviors..     The patient has the following protective factors: social support, voluntarily seeking mental health support, established relationship community mental health provider(s) and safe/stable housing    Support system information: Patient is one of three residents at her current group home, staffed by a minimum of two  staff in a 24 hour period.    Does the patient engage in non-suicidal self-injurious behavior (NSSI/SIB)? yes. Method:cutting Frequency:when she decompensates emotionally and chooses to self harm Duration:unclear History: extensive    Is the patient vulnerable to sexual exploitation?  Yes due to intellectual disabilities    Is the patient experiencing abuse or neglect? no    Is the patient a vulnerable adult? Yes Added concern for being a group home resident, her ongoing chronic use of 911 and the ED as a coping choice, and her continued ability to be her own guardian.      Risk of Harm to Others  The patient has the following risk factors of harm to others: access to weapons, agitation, aggression and impaired self-control She seems to still have access to knives in the group home, as in today's presentation stated on arrival.    Does the patient have thoughts of harming others? Yes. Prior assessments show she has threatened staff previously, or staff has interpreted it as threats. Patient insists today she was threatening self harm only.    Is the patient engaging in sexually inappropriate behavior?  no       Current Substance Abuse    Is there recent substance abuse? no    Was a urine drug screen or blood alcohol level obtained: No    CAGE AID  Have you felt you ought to cut down on your drinking or drug use?  No  Have people annoyed you by criticizing your drinking or drug use? No  Have you felt bad or guilty about your drinking or drug use? No  Have you ever had a drink or used drugs first thing in the morning to steady your nerves or to get rid of a hangover? No  Score: 0/4       Current Symptoms/Concerns    Symptoms  Attention, hyperactivity, and impulsivity symptoms present: Yes: Impulsive and Other: admits an inability to use learned coping skills.    Anxiety symptoms present: Yes: Generalized Symptoms: Agitation, Cognitive anxiety - feelings of doom, racing thoughts, difficulty concentrating  and Excessive  "worry      Appetite symptoms present: No     Behavioral difficulties present: Yes: Agitation, Anger Problems, Hostile/Aggressive and Impulsivity/Disinhibition     Cognitive impairment symptoms present: Yes: Decision-Making, Judgment/Insight and Other: intellectually impaired diagnosis.    Depressive symptoms present: Yes Crying or feels like crying, Excessive guilt , Feelings of helplessness , Feelings of hopelessness , Feelings of worthlessness , Impaired decision making , Increased irritability/agitation and Thoughts of suicide/death      Eating disorder symptoms present: No    Learning disabilities, cognitive challenges, and/or developmental disorder symptoms present: Yes: Developmental Incidents, Mood and Self-Regulation     Manic/hypomanic symptoms present: No    Personality and interpersonal functioning difficulties present : Yes: Emotional Deregulation, Impaired Impulse Control and Impaired Interpersonal Functioning    Psychosis symptoms present: No      Sleep difficulties present: No    Substance abuse disorder symptoms present: No     Trauma and stressor related symptoms present: No           Mental Status Exam   Affect: Dramatic   Appearance: Disheveled    Attention Span/Concentration: Attentive?    Eye Contact: Avoidant and Variable   Fund of Knowledge: Appropriate    Language /Speech Content: Fluent   Language /Speech Volume: Normal    Language /Speech Rate/Productions: Normal    Recent Memory: Intact   Remote Memory: Intact   Mood: Depressed, Irritable and Sad    Orientation to Person: Yes    Orientation to Place: Yes   Orientation to Time of Day: Yes    Orientation to Date: Yes    Situation (Do they understand why they are here?): Yes    Psychomotor Behavior: Normal    Thought Content: Other: remorseful. Fearful of having \"burned her bridges\".   Thought Form: Intact       Mental Health and Substance Abuse History    History  Current and historical diagnoses or mental health concerns: ADHD, MDD, Bi " "Polar Disorder, Borderline Personality Disorder, Intellectual Disability.    Prior MH services (inpatient, programmatic care, outpatient, etc) : Yes Patient has therapists ( and [facility])    Has the patient used [facility] crisis team services before?: Yes [facility]    History of substance abuse: No    Prior LIZZETTE services (inpatient, programmatic care, detox, outpatient, etc) : No    History of commitment: No    Family history of MH/LIZZETTE: Yes Patient is a poor historian    Trauma history: Yes Patient endorses trauma but cannot articulate details.    Medication  Psychotropic medications: Yes. Pt is currently taking Abilify, Hydroxyzine, Naltrexone, Zyprexa, Effexor. Medication compliant: No: Patient states she flushed her medications down the toilet  today because \"they don't work\".. Recent medication changes: No    Current Care Team  Primary Care Provider: Yes. Name: [facility]. Location: [place]. Date of last visit: unknown. Frequency: unknown. Perceived helpfulness: unclear.    Psychiatrist: Yes. Name: Dr. MARTÍNEZ Jacobson. Location: [place], [place]. Date of last visit: Last week. Frequency: Weekly. Perceived helpfulness: Yes.    Therapist: Yes. Name: Tara. Location: [facility]. Date of last visit: Last week. Frequency: weekly. Perceived helpfulness: unclear.    : Yes. Name: Jyoti Malik. [facility]. Date of last visit: Unclear. Frequency: Unclear. Perceived helpfulness: Unclear.    [facility] or [facility]: Yes. Name: Treva Michelle. Location: [place], [place]. Date of last visit: last week. Frequency: Weekly. Perceived helpfulness: Yes.    ACT Team: No    Other: No    Release of Information  Was a release of information signed: No. Reason: Releases on file      Biopsychosocial Information    Socioeconomic Information  Current living situation: Group home resident.    Employment/income source: SSDI    Relevant legal issues: None reported    Cultural, Baptist, or " spiritual influences on mental health care: None reported    Is the patient active in the  or a : No      Relevant Medical Concerns   Patient identifies concerns with completing ADLs? No     Patient can ambulate independently? Yes     Other medical concerns? No     History of concussion or TBI? No        Diagnosis    301.83 (F60.3) Borderline Personality Disorder - by history     296.33 (F33.2) Major Depressive Disorder, Recurrent Episode, Severe _ and With mixed features - by history     Intellectual Disabilites  317 (F70) Mild - by history       Therapeutic Intervention  The following therapeutic methodologies were employed when working with the patient: establishing rapport, active listening, assessing dimensions of crisis, identifying additional supports and alternative coping skills, motivational interviewing and brief supportive therapy. Patient response to intervention: positive, byt limited in participation through emotional dysregulation (crying).      Disposition  Recommended disposition: Group Home: [phone]      Reviewed case and recommendations with attending provider. Attending Name: Dr. MARTÍNEZ Olea MD      Attending concurs with disposition: Yes      Patient concurs with disposition: Yes      Guardian concurs with disposition: NA     Final disposition: Group home: recommended return to group home, continue with services in place. .       Clinical Substantiation of Recommendations   Rationale with supporting factors for disposition and diagnosis.     Patient is discharged back to group home. Patient is currently behaviorally stable, is not expressing suicidal, homicidal, or self harming behaviors, and is requesting to return to her group home.  Patient remains a chronic user of 911 and the ED as a coping mechanism. Group home is notified of her pending discharge and is willing to receive her at this time.    Assessment Details  Patient interview started at: 2100 and completed  at: 2200.    Total duration spent on the patient case in minutes: 1.0 hrs     CPT code(s) utilized: 23772 - Psychotherapy for Crisis - 60 (30-74*) min       Aftercare and Safety Planning  Follow up plans with MH/LIZZETTE services: No      Aftercare plan placed in the AVS and provided to patient: Yes. Given to patient by RN    Mario Andrew MA      Aftercare Plan  Warning signs that I or other people might notice when a crisis is developing for me: I am feeling suicidal and I have tried my coping skills and talked to staff and nothing is helping before I call 911.     Things I am able to do on my own to cope or help me feel better: Go for walks. Listening to my old school country music     Things that I am able to do with others to cope or help me better: Talk with staff or my peers helps.      Things I can use or do for distraction: Reading or watching a good movie. Going for walks.     Changes I can make to support my mental health and wellness: Learn and use coping skills before calling 911. Us 911 only if necessary.     People in my life that I can ask for help: Group home staff. Peers.     Your county has a mental health crisis team you can call 24/7: [facility] Crisis: Adult [phone]  Child [phone]     Other things that are important when I m in crisis: Do not call 911 unless it is serious and unmanageable. I am not alone. I have skills and people that can help.              Additional resources and information:       Crisis Lines  Crisis Text Line  Text 806470  You will be connected with a trained live crisis counselor to provide support.     Por espanol, texto  SIRENA a 041890 o texto a 442-AYUDAME en WhatsApp     National Hope Line  1.800.SUICIDE [9858914]        Community Resources  Fast Tracker  Linking people to mental health and substance use disorder resources  [employer]n.org      [place] Mental Health Warm Line  Peer to peer support  Monday thru Saturday, 12 pm to 10 pm   "603.828.7095 or 1.886.004.0562  Text \"Support\" to 68097     National [place] on Mental Illness (MAGY)  429.256.9127 or 1.888.MAGY.HELPS           Mental Health Apps  My3  https://myTwoFpp.org/     VirtualHopeBox  https://[employer].org/apps/virtual-hope-box/  "

## 2022-03-14 NOTE — ED NOTES
Bed: ED16A  Expected date: 3/13/22  Expected time: 8:12 PM  Means of arrival: Ambulance  Comments:  N736  21F  EFRAIN magdaleno, on a hold  ETA 2027

## 2022-03-14 NOTE — DISCHARGE INSTRUCTIONS
"Discharge to home group home continue with current outpatient services.    Warning signs that I or other people might notice when a crisis is developing for me: I am feeling suicidal and I have tried my coping skills and talked to staff and nothing is helping before I call 911.     Things I am able to do on my own to cope or help me feel better: Go for walks. Listening to my old school country music     Things that I am able to do with others to cope or help me better: Talk with staff or my peers helps.      Things I can use or do for distraction: Reading or watching a good movie. Going for walks.     Changes I can make to support my mental health and wellness: Learn and use coping skills before calling 911. Us 911 only if necessary.     People in my life that I can ask for help: Group home staff. Peers.     Your county has a mental health crisis team you can call 24/7: [facility] Crisis: Adult [phone]  Child [phone]     Other things that are important when I m in crisis: Do not call 911 unless it is serious and unmanageable. I am not alone. I have skills and people that can help.              Additional resources and information:       Crisis Lines  Crisis Text Line  Text 322656  You will be connected with a trained live crisis counselor to provide support.     Por espanol, texto  SIRENA a 730690 o texto a 442-AYUDAME en WhatsApp     National Hope Line  1.800.SUICIDE [7161555]        Community Resources  Fast Tracker  Linking people to mental health and substance use disorder resources  fasttrackermn.org      [place] Mental Health Warm Line  Peer to peer support  Monday thru Saturday, 12 pm to 10 pm  576.120.3065 or 6.652.325.3718  Text \"Support\" to 24324     National [place] on Mental Illness (MAGY)  931.554.9225 or 1.888.MAGY.HELPS           Mental Health Apps  My3  https://my3app.org/     VirtualHopeBox  https://[employer].org/apps/virtual-hope-box/  "

## 2022-03-14 NOTE — TELEPHONE ENCOUNTER
"Triage Call:    Patient called to report feeling of harming self and others.  Patient has history of suicidal ideation, borderline personality disorder, and bipolar.  She report she \"almost cut herself and threw all medications away.\"  Patient lives in a group home and reports she is currently in a room with 2 staff members monitoring her.  Patient reports she is still having thoughts of harming herself.    According to the protocol, patient should call 911.  Patient provided nurse's phone number.  Called nurse, Arlene, to advise of phone call and of disposition.  Care advice given. Patient's nurse verbalizes understanding and agrees with plan of care. Transferred to scheduling.     Chloe Piper RN  03/13/22 7:37 PM  [facility] Nurse Advisor    COVID 19 Nurse Triage Plan/Patient Instructions    Please be aware that novel coronavirus (COVID-19) may be circulating in the community. If you develop symptoms such as fever, cough, or SOB or if you have concerns about the presence of another infection including coronavirus (COVID-19), please contact your health care provider or visit [email or web address].     Disposition/Instructions    Call to EMS/911 recommended. Follow protocol based instructions.     Bring Your Own Device:  Please also bring your smart device(s) (smart phones, tablets, laptops) and their charging cables for your personal use and to communicate with your care team during your visit.    Thank you for taking steps to prevent the spread of this virus.  o Limit your contact with others.  o Wear a simple mask to cover your cough.  o Wash your hands well and often.    Resources    [facility]: About COVID-19: [email or web address]    CDC: What to Do If You're Sick: www.cdc.gov/coronavirus/2019-ncov/about/steps-when-sick.html    CDC: Ending Home Isolation: www.cdc.gov/coronavirus/2019-ncov/hcp/disposition-in-home-patients.html     CDC: Caring for Someone: " www.cdc.gov/coronavirus/2019-ncov/if-you-are-sick/care-for-someone.html     [facility]: Interim Guidance for Hospital Discharge to Home: www.health.[facility].[place]/diseases/coronavirus/hcp/hospdischarge.pdf    [facility] clinical trials (COVID-19 research studies): [email or web address].[facility]-clinical-trials     Below are the COVID-19 hotlines at the [place] Department of Health ([facility]). Interpreters are available.   o For health questions: Call 847-449-5305 or 1-999.216.5414 (7 a.m. to 7 p.m.)  o For questions about schools and childcare: Call 233-855-0562 or 1-779.302.1137 (7 a.m. to 7 p.m.)       Reason for Disposition    Patient is threatening suicide now    Additional Information    Negative: Patient attempted suicide    Protocols used: SUICIDE [id number]

## 2022-03-14 NOTE — TELEPHONE ENCOUNTER
Got in trouble last night at group home and they are telling her she may have to move.  She is asking what can we do.  Nothing.    That is up to them if she does not meet the criteria of liviing in their faclilty.    Chloe Perez RN  [facility] [place] Nurse Advisor

## 2022-03-14 NOTE — ED PROVIDER NOTES
"    [facility] - [place], [place] EMERGENCY DEPARTMENT ([facility])       3/13/22  History     Chief Complaint   Patient presents with     Aggressive Behavior     threatened to hurt staff,  PD witnessed how pt threatened staff, per EMS [facility] refuse to take her back     Suicidal     called 911 5x today, stated suicidal comments with knives in the room but did not hurt herself     HPI  Sadaf Ross is a 21 year old female with a past medical history significant for borderline personality disorder, bipolar 1 disorder, depressive disorder, developmental delay, chronic abdominal pain, and frequent ED visits who presents to the Emergency Department for evaluation of suicidal ideation and aggressive behavior.  Patient got into a conflict that she said was based on difficulty with communication secondary to history of \"accent\" patient states that she made statements that were taken out of context and that she did not threatened staff and that she was actually thinking about harming herself she has now returned to baseline and is requesting that she be sent back to her group home initially patient was somewhat agitated here in the emergency room but was voluntarily able to take Zyprexa and has calmed considerably is essentially back to baseline.        Past Medical History  Past Medical History:   Diagnosis Date     ADHD (attention deficit hyperactivity disorder)      Bipolar 1 disorder (H)      Borderline personality disorder (H)      Depression      Depressive disorder      Intellectual disability      Obesity      Syncope      Past Surgical History:   Procedure Laterality Date     APPENDECTOMY       APPENDECTOMY       ARIPiprazole ER (ABILIFY MAINTENA) 400 MG extended release inj syringe  benztropine (COGENTIN) 0.5 MG tablet  calcium carbonate (TUMS) 500 MG chewable tablet  chlorhexidine (CHLORHEXIDINE) 0.12 % solution  docusate sodium (COLACE) 100 MG capsule  hydrOXYzine (ATARAX) 50 MG tablet  metoclopramide (REGLAN) 10 MG " tablet  norgestimate-ethinyl estradiol (SPRINTEC 28) 0.25-35 MG-MCG tablet  OLANZapine (ZYPREXA) 2.5 MG tablet  OLANZapine zydis (ZYPREXA) 5 MG ODT  omeprazole (PRILOSEC) 40 MG DR capsule  ondansetron (ZOFRAN) 4 MG tablet  polyethylene glycol (MIRALAX) 17 g packet  prochlorperazine (COMPAZINE) 10 MG tablet  promethazine (PHENERGAN) 25 MG suppository  venlafaxine (EFFEXOR-XR) 150 MG 24 hr capsule  Vitamin D, Cholecalciferol, 25 MCG (1000 UT) TABS      Allergies   Allergen Reactions     Penicillins Rash and Unknown     Family History  Family History   Problem Relation Age of Onset     Diabetes Type 1 Father      Cancer Paternal Grandfather      Social History   Social History     Tobacco Use     Smoking status: Current Every Day Smoker     Packs/day: 1.00     Years: 5.00     Pack years: 5.00     Types: Cigarettes     Smokeless tobacco: Never Used   Substance Use Topics     Alcohol use: No     Drug use: No      Past medical history, past surgical history, medications, allergies, family history, and social history were reviewed with the patient. No additional pertinent items.     I have reviewed the Medications, Allergies, Past Medical and Surgical History, and Social History in the Epic system.    Review of Systems  A complete review of systems was performed with pertinent positives and negatives noted in the HPI, and all other systems negative.    Physical Exam   BP: (!) 135/97  Pulse: (!) 137  Temp: 97.7  F (36.5  C)  Resp: 16  SpO2: 94 %      Physical Exam  Constitutional:       General: She is not in acute distress.     Appearance: She is not diaphoretic.   HENT:      Head: Atraumatic.      Mouth/Throat:      Pharynx: No oropharyngeal exudate.   Eyes:      General: No scleral icterus.     Pupils: Pupils are equal, round, and reactive to light.   Cardiovascular:      Heart sounds: Normal heart sounds.   Pulmonary:      Effort: No respiratory distress.      Breath sounds: Normal breath sounds.   Abdominal:       General: Bowel sounds are normal.      Palpations: Abdomen is soft.      Tenderness: There is no abdominal tenderness.   Musculoskeletal:         General: No tenderness.   Skin:     General: Skin is warm.      Findings: No rash.   Neurological:      General: No focal deficit present.      Mental Status: She is oriented to person, place, and time.      Motor: No weakness.      Coordination: Coordination normal.   Psychiatric:         Mood and Affect: Mood is anxious. Affect is labile.         Speech: Speech normal.         Behavior: Behavior is agitated and aggressive.         Thought Content: Thought content does not include homicidal ideation.         ED Course     At 8:45 PM the patient was seen and examined by Robert Olea MD in Room ED.       Procedures       Patient seen and evaluated by the  please refer to their documentation in the note section of the epic chart dated 3/13/2022    The medical record was reviewed and interpreted.         No results found for this or any previous visit (from the past 24 hour(s)).  Medications   OLANZapine zydis (zyPREXA) ODT tab 10 mg (10 mg Oral Given 3/13/22 2039)             Assessments & Plan (with Medical Decision Making)     I have reviewed the nursing notes.    I have reviewed the findings, diagnosis, plan and need for follow up with the patient.    Patient with history of bipolar affective disorder intellectual disability and borderline personality disorder at this time has had chronic recurring visits to the emergency room however she is essentially baseline once again is not an immediate risk of harming herself she will be discharged back to the group home will continue with evaluation of possible placement change otherwise will follow up with her outpatient psychiatric providers.    Final diagnoses:   Intellectual disability   Borderline personality disorder (H)   Bipolar affective disorder, currently depressed, moderate (H)       Jami MCGINNIS  Nba am serving as a trained medical scribe to document services personally performed by Robert Olea MD, based on the provider's statements to me.      I, Robert Olea MD, was physically present and have reviewed and verified the accuracy of this note documented by Jami Arango.     Robert Olea MD  3/13/2022   [facility] EMERGENCY DEPARTMENT     Robert Olea MD  03/14/22 0149       Robert Olea MD  03/23/22 1120

## 2022-03-21 ENCOUNTER — HOSPITAL ENCOUNTER (OUTPATIENT)
Dept: BEHAVIORAL HEALTH | Facility: CLINIC | Age: 22
Discharge: HOME | End: 2022-03-21
Attending: FAMILY MEDICINE | Admitting: FAMILY MEDICINE
Payer: MEDICARE

## 2022-03-21 PROCEDURE — 90791 PSYCH DIAGNOSTIC EVALUATION: CPT | Mod: TEL | Performed by: COUNSELOR

## 2022-03-21 ASSESSMENT — ANXIETY QUESTIONNAIRES
GAD7 TOTAL SCORE: 21
5. BEING SO RESTLESS THAT IT IS HARD TO SIT STILL: NEARLY EVERY DAY
1. FEELING NERVOUS, ANXIOUS, OR ON EDGE: NEARLY EVERY DAY
2. NOT BEING ABLE TO STOP OR CONTROL WORRYING: NEARLY EVERY DAY
4. TROUBLE RELAXING: NEARLY EVERY DAY
7. FEELING AFRAID AS IF SOMETHING AWFUL MIGHT HAPPEN: NEARLY EVERY DAY
3. WORRYING TOO MUCH ABOUT DIFFERENT THINGS: NEARLY EVERY DAY
6. BECOMING EASILY ANNOYED OR IRRITABLE: NEARLY EVERY DAY

## 2022-03-21 ASSESSMENT — COLUMBIA-SUICIDE SEVERITY RATING SCALE - C-SSRS
4. HAVE YOU HAD THESE THOUGHTS AND HAD SOME INTENTION OF ACTING ON THEM?: NO
6. HAVE YOU EVER DONE ANYTHING, STARTED TO DO ANYTHING, OR PREPARED TO DO ANYTHING TO END YOUR LIFE?: YES
1. IN THE PAST MONTH, HAVE YOU WISHED YOU WERE DEAD OR WISHED YOU COULD GO TO SLEEP AND NOT WAKE UP?: YES
2. HAVE YOU ACTUALLY HAD ANY THOUGHTS OF KILLING YOURSELF IN THE PAST MONTH?: YES
5. HAVE YOU STARTED TO WORK OUT OR WORKED OUT THE DETAILS OF HOW TO KILL YOURSELF? DO YOU INTEND TO CARRY OUT THIS PLAN?: NO
3. HAVE YOU BEEN THINKING ABOUT HOW YOU MIGHT KILL YOURSELF?: NO

## 2022-03-21 ASSESSMENT — PATIENT HEALTH QUESTIONNAIRE - PHQ9: SUM OF ALL RESPONSES TO PHQ QUESTIONS 1-9: 25

## 2022-03-21 NOTE — PROGRESS NOTES
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 Identify Early Warning Signs:    Sometimes symptoms return, even when people do their best to stay well. Symptoms can develop over a short period of time with little or no warning, but most of the time they emerge gradually over several weeks.  Early warning signs are changes that people experience when a relapse is starting. Some early warning signs are common and others are not as common.   Common Early Warning Signs:    Feeling tense or nervous, Eating less or eating more, Trouble sleeping -either too much or too little sleep, Feeling depressed or low, Feeling irritable, Feeling like not being around other people, Trouble concentrating, Urges to harm self and Urges to harm others     2. Identify action steps to take when warning signs are noticed:    Taking Action- It is important to take action if you are experiencing early warning signs of a relapse.  The faster you act, the more likely it is that you can avoid a full relapse.  It is helpful to identify several specific ways to cope  with symptoms.      The following is my list of symptoms and coping strategies that I can use when they are present:    Symptom Coping Strategies   Anxiety -Talk with someone in your support system and let him or her know how you are feeling.  -Use relaxation techniques such as deep breathing or imagery.  -Use positive affirmations to counteract negative self-talk such as  I am learning to let go of worry.    Depression - Schedule your day; include activities you have to do and activities you enjoy doing.  - Get some exercise - walk, run, bike, or swim.  - Give yourself credit for even the smallest things you get done.   Sleep Difficulties   - Go to sleep at the same time every day.  - Do something relaxing before bed, such as drinking herbal tea or listening to music.  - Avoid having discussions about upsetting topics before going to bed.   Delusions   - Distract yourself from the disturbing thought by doing something that requires your attention such as a puzzle.  - Check out your beliefs by talking to someone you trust.    Hallucinations   - Use headphones to listen to music.  - Tell voices to  stop  or say to yourself,  I am safe.   - Ignore the hallucinations as much as possible; focus on other things.   Concentration Difficulties - Minimize distractions so there is only one thing for you to focus on at a time.    - Ask the person you are having a conversation with to slow down or repeat things you are unsure of.                LOCUS Worksheet     Name: Sadaf Ross MRN: [id number]    : 2000      Gender:  female    PMI:  HP MA # [id number]   Provider Name: Becca Martinez   Provider NPI:  [id number]    Actual level of Care Provided:  therapy    Service(s) receiving or referred to:  ADT    Reason for Variance: patient needs more structure and supports    Rating completed by: Becca Martinez, ZEESHAN, LICSW, LADC      I. Risk of Harm:   3      Moderate Risk of Harm    II. Functional Status:   3      Moderate  Impairment    III. Co-Morbidity:   3      Significant Co-Morbidity    IV - A. Recovery Environment - Level of Stress:   3      Moderately Stress Environment    IV - B. Recovery Environment - Level of Support:   2      Supportive Environment    V. Treatment and Recovery History:   3      Moderate to Equivocal Response to Treatment and Recovery Management    VI. Engagement and Recovery Project:   2      Positive Engagement and Recovery       19 Composite Score    Level of Care Recommendation:   17 to 19       High Intensity Community Based Services

## 2022-03-22 ASSESSMENT — ANXIETY QUESTIONNAIRES: GAD7 TOTAL SCORE: 21

## 2022-03-31 ENCOUNTER — LAB REQUISITION (OUTPATIENT)
Dept: LAB | Facility: CLINIC | Age: 22
End: 2022-03-31
Payer: COMMERCIAL

## 2022-03-31 DIAGNOSIS — Z30.09 ENCOUNTER FOR OTHER GENERAL COUNSELING AND ADVICE ON CONTRACEPTION: ICD-10-CM

## 2022-03-31 PROCEDURE — G0145 SCR C/V CYTO,THINLAYER,RESCR: HCPCS | Mod: ORL | Performed by: INTERNAL MEDICINE

## 2022-03-31 PROCEDURE — 87624 HPV HI-RISK TYP POOLED RSLT: CPT | Mod: ORL | Performed by: INTERNAL MEDICINE

## 2022-04-05 LAB
BKR LAB AP GYN ADEQUACY: NORMAL
BKR LAB AP GYN INTERPRETATION: NORMAL
BKR LAB AP HPV REFLEX: NORMAL
BKR LAB AP LMP: NORMAL
BKR LAB AP PREVIOUS ABNORMAL: NORMAL
PATH REPORT.COMMENTS IMP SPEC: NORMAL
PATH REPORT.COMMENTS IMP SPEC: NORMAL
PATH REPORT.RELEVANT HX SPEC: NORMAL

## 2022-04-07 LAB
HUMAN PAPILLOMA VIRUS 16 DNA: NEGATIVE
HUMAN PAPILLOMA VIRUS 18 DNA: NEGATIVE
HUMAN PAPILLOMA VIRUS FINAL DIAGNOSIS: NORMAL
HUMAN PAPILLOMA VIRUS OTHER HR: NEGATIVE

## 2022-04-08 ENCOUNTER — NURSE TRIAGE (OUTPATIENT)
Dept: NURSING | Facility: CLINIC | Age: 22
End: 2022-04-08
Payer: MEDICARE
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
## 2022-04-19 ENCOUNTER — MEDICAL CORRESPONDENCE (OUTPATIENT)
Dept: HEALTH INFORMATION MANAGEMENT | Facility: CLINIC | Age: 22
End: 2022-04-19
Payer: MEDICARE

## 2022-04-28 ENCOUNTER — LAB REQUISITION (OUTPATIENT)
Dept: LAB | Facility: CLINIC | Age: 22
End: 2022-04-28
Payer: COMMERCIAL

## 2022-04-28 DIAGNOSIS — Z20.822 CONTACT WITH AND (SUSPECTED) EXPOSURE TO COVID-19: ICD-10-CM

## 2022-05-02 LAB
SARS-COV-2 RNA RESP QL NAA+PROBE: NEGATIVE
SCANNED LAB RESULT: NORMAL

## 2022-05-06 ENCOUNTER — HOSPITAL ENCOUNTER (EMERGENCY)
Facility: CLINIC | Age: 22
Discharge: HOME | End: 2022-05-06
Attending: PSYCHIATRY & NEUROLOGY | Admitting: PSYCHIATRY & NEUROLOGY
Payer: MEDICARE

## 2022-05-06 VITALS
OXYGEN SATURATION: 97 % | SYSTOLIC BLOOD PRESSURE: 147 MMHG | DIASTOLIC BLOOD PRESSURE: 62 MMHG | TEMPERATURE: 98.7 F | RESPIRATION RATE: 18 BRPM | HEART RATE: 62 BPM

## 2022-05-06 DIAGNOSIS — F43.0 ACUTE REACTION TO STRESS: ICD-10-CM

## 2022-05-06 DIAGNOSIS — F79 INTELLECTUAL DISABILITY: ICD-10-CM

## 2022-05-06 PROCEDURE — 99282 EMERGENCY DEPT VISIT SF MDM: CPT | Performed by: PSYCHIATRY & NEUROLOGY

## 2022-05-06 ASSESSMENT — ENCOUNTER SYMPTOMS
HALLUCINATIONS: 0
HYPERACTIVE: 0
EYES NEGATIVE: 1
DECREASED CONCENTRATION: 1
NEUROLOGICAL NEGATIVE: 1
RESPIRATORY NEGATIVE: 1
CONSTITUTIONAL NEGATIVE: 1
NERVOUS/ANXIOUS: 1
MUSCULOSKELETAL NEGATIVE: 1
CARDIOVASCULAR NEGATIVE: 1
GASTROINTESTINAL NEGATIVE: 1

## 2022-05-06 NOTE — ED TRIAGE NOTES
Per EMS the Pt called 911 stating she was suicidal because her roommate is in the hospital and there are very close and she was having suicidal thoughts.

## 2022-05-07 NOTE — ED PROVIDER NOTES
ED Provider Note  [facility]      History     Chief Complaint   Patient presents with     Suicidal     HPI  Sadaf Ross is a 21 year old female who is here via EMS from her group home as she made suicidal threats with intent of getting admitted. Patient has history of intellectual disability and resides in a group home. She is well-known to us from her previously multiple visits as she has poor coping skills and seeks to get her needs met in the ED and inpatient. She was upset today as she learned that her best friend is currently psychiatrically hospitalized. She decided that she should also come into the hospital. There are no other acute stressors. Patient does not exhibit altered mental status or psychosis.     PERSONAL MEDICAL HISTORY  Past Medical History:   Diagnosis Date     ADHD (attention deficit hyperactivity disorder)      Bipolar 1 disorder (H)      Borderline personality disorder (H)      Depression      Depressive disorder      Intellectual disability      Obesity      Syncope      PAST SURGICAL HISTORY  Past Surgical History:   Procedure Laterality Date     APPENDECTOMY       APPENDECTOMY       FAMILY HISTORY  Family History   Problem Relation Age of Onset     Diabetes Type 1 Father      Cancer Paternal Grandfather      SOCIAL HISTORY  Social History     Tobacco Use     Smoking status: Current Every Day Smoker     Packs/day: 1.00     Years: 5.00     Pack years: 5.00     Types: Cigarettes     Smokeless tobacco: Never Used   Substance Use Topics     Alcohol use: No     MEDICATIONS  No current facility-administered medications for this encounter.     Current Outpatient Medications   Medication     ARIPiprazole ER (ABILIFY MAINTENA) 400 MG extended release inj syringe     benztropine (COGENTIN) 0.5 MG tablet     calcium carbonate (TUMS) 500 MG chewable tablet     chlorhexidine (CHLORHEXIDINE) 0.12 % solution     docusate sodium (COLACE) 100 MG capsule     hydrOXYzine  (ATARAX) 50 MG tablet     metoclopramide (REGLAN) 10 MG tablet     norgestimate-ethinyl estradiol (SPRINTEC 28) 0.25-35 MG-MCG tablet     OLANZapine (ZYPREXA) 2.5 MG tablet     OLANZapine zydis (ZYPREXA) 5 MG ODT     omeprazole (PRILOSEC) 40 MG DR capsule     ondansetron (ZOFRAN) 4 MG tablet     polyethylene glycol (MIRALAX) 17 g packet     prochlorperazine (COMPAZINE) 10 MG tablet     promethazine (PHENERGAN) 25 MG suppository     venlafaxine (EFFEXOR-XR) 150 MG 24 hr capsule     Vitamin D, Cholecalciferol, 25 MCG (1000 UT) TABS     ALLERGIES  Allergies   Allergen Reactions     Penicillins Rash and Unknown          Review of Systems   Constitutional: Negative.    HENT: Negative.    Eyes: Negative.    Respiratory: Negative.    Cardiovascular: Negative.    Gastrointestinal: Negative.    Genitourinary: Negative.    Musculoskeletal: Negative.    Skin: Negative.    Neurological: Negative.    Psychiatric/Behavioral: Positive for decreased concentration and suicidal ideas. Negative for hallucinations. The patient is nervous/anxious. The patient is not hyperactive.    All other systems reviewed and are negative.        Physical Exam   BP: 114/65  Pulse: 87  Temp: 98.7  F (37.1  C)  Resp: 16  SpO2: 100 %  Physical Exam  Vitals and nursing note reviewed.   HENT:      Head: Normocephalic.   Eyes:      Pupils: Pupils are equal, round, and reactive to light.   Pulmonary:      Effort: Pulmonary effort is normal.   Musculoskeletal:         General: Normal range of motion.      Cervical back: Normal range of motion.   Neurological:      General: No focal deficit present.      Mental Status: She is alert.   Psychiatric:         Attention and Perception: Attention and perception normal. She does not perceive auditory or visual hallucinations.         Mood and Affect: Mood normal. Affect is blunt.         Speech: Speech normal.         Behavior: Behavior normal. Behavior is not agitated, aggressive, hyperactive or combative. Behavior  is cooperative.         Thought Content: Thought content normal. Thought content is not paranoid or delusional. Thought content does not include homicidal or suicidal ideation.         Cognition and Memory: Cognition and memory normal.         Judgment: Judgment normal.         ED Course      Procedures         Mental Health Risk Assessment      PSS-3    Date and Time Over the past 2 weeks have you felt down, depressed, or hopeless? Over the past 2 weeks have you had thoughts of killing yourself? Have you ever attempted to kill yourself? When did this last happen? User   05/06/22 1854 yes yes yes more than 6 months ago [facility]      C-SSRS ([place])    Date and Time Q1 Wished to be Dead (Past Month) Q2 Suicidal Thoughts (Past Month) Q3 Suicidal Thought Method Q4 Suicidal Intent without Specific Plan Q5 Suicide Intent with Specific Plan Q6 Suicide Behavior (Lifetime) Within the Past 3 Months? RETIRED: Level of Risk per Screen Screening Not Complete User   05/06/22 1856 yes yes yes no no no -- -- -- [facility]   05/06/22 1854 yes yes no no no yes -- -- -- [facility]                Item Assessment   Suicidal Ideation Wish to be dead   Plan Not active   Intent Not active   Suicidal or self-harm behaviors Chronic and nonacute, at baseline   Risk Factors Recent losses or other significant negative event(s) [legal, financial, relationship, etc]   Protective Factors Ongoing support through Group Home              No results found for any visits on 05/06/22.  Medications - No data to display     Assessments & Plan (with Medical Decision Making)   Patient is here for a mental health evaluation. She has history of intellectual disability who has very poor coping skills and tends to seek hospitalization to get her needs met. She appears at baseline. Patient made a rote comment of needing to be hospitalized, citing that she plans on shooting herself. She initially had flubbed her response by saying that she needs a shot when I offered her an oral  dose of Zyprexa. When told that she was not going to be referred for admission, and that I will only offer a dose of Zyprexa to calm her down, she reports not needing it and is ready to return to her group home if she is not going to be admitted. I felt this would be the best disposition for her. I had talked to her group home staff who knows her well and is comfortable with her return. There are no safety concerns. Patient can be discharged back to her group home via ambulance due to her vulnerable status (intellectual disability and group home resident). She is encouraged to work with her outpatient established services and cares.    I have reviewed the nursing notes. I have reviewed the findings, diagnosis, plan and need for follow up with the patient.    New Prescriptions    No medications on file       Final diagnoses:   Acute reaction to stress   Intellectual disability       --  Magno Sena MD  [facility] EMERGENCY DEPARTMENT  5/6/2022     Magno Sena MD  05/06/22 1942

## 2022-05-07 NOTE — DISCHARGE INSTRUCTIONS
Please talk to your therapist for support and work on healthy coping  Follow-up established care and services

## 2022-05-16 ENCOUNTER — APPOINTMENT (OUTPATIENT)
Dept: GENERAL RADIOLOGY | Facility: CLINIC | Age: 22
End: 2022-05-16
Attending: FAMILY MEDICINE

## 2022-05-16 ENCOUNTER — HOSPITAL ENCOUNTER (EMERGENCY)
Facility: CLINIC | Age: 22
Discharge: HOME | End: 2022-05-16
Attending: FAMILY MEDICINE | Admitting: FAMILY MEDICINE

## 2022-05-16 ENCOUNTER — NURSE TRIAGE (OUTPATIENT)
Dept: NURSING | Facility: CLINIC | Age: 22
End: 2022-05-16
Payer: MEDICARE

## 2022-05-16 VITALS
SYSTOLIC BLOOD PRESSURE: 132 MMHG | HEART RATE: 77 BPM | RESPIRATION RATE: 18 BRPM | OXYGEN SATURATION: 98 % | DIASTOLIC BLOOD PRESSURE: 67 MMHG | TEMPERATURE: 98.2 F

## 2022-05-16 DIAGNOSIS — S39.012A STRAIN OF LUMBAR REGION, INITIAL ENCOUNTER: ICD-10-CM
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
## 2022-05-16 PROCEDURE — 99284 EMERGENCY DEPT VISIT MOD MDM: CPT | Performed by: FAMILY MEDICINE

## 2022-05-16 PROCEDURE — 96372 THER/PROPH/DIAG INJ SC/IM: CPT | Performed by: FAMILY MEDICINE

## 2022-05-16 PROCEDURE — 72040 X-RAY EXAM NECK SPINE 2-3 VW: CPT

## 2022-05-16 PROCEDURE — 72100 X-RAY EXAM L-S SPINE 2/3 VWS: CPT

## 2022-05-16 PROCEDURE — 250N000013 HC RX MED GY IP 250 OP 250 PS 637: Performed by: FAMILY MEDICINE

## 2022-05-16 PROCEDURE — 99283 EMERGENCY DEPT VISIT LOW MDM: CPT | Performed by: FAMILY MEDICINE

## 2022-05-16 PROCEDURE — 250N000011 HC RX IP 250 OP 636: Performed by: FAMILY MEDICINE

## 2022-05-16 RX ORDER — KETOROLAC TROMETHAMINE 30 MG/ML
30 INJECTION, SOLUTION INTRAMUSCULAR; INTRAVENOUS ONCE
Status: COMPLETED | OUTPATIENT
Start: 2022-05-16 | End: 2022-05-16

## 2022-05-16 RX ORDER — OLANZAPINE 5 MG/1
5 TABLET, ORALLY DISINTEGRATING ORAL ONCE
Status: COMPLETED | OUTPATIENT
Start: 2022-05-16 | End: 2022-05-16

## 2022-05-16 RX ORDER — IBUPROFEN 800 MG/1
800 TABLET, FILM COATED ORAL EVERY 8 HOURS PRN
Qty: 15 TABLET | Refills: 0 | Status: SHIPPED | OUTPATIENT
Start: 2022-05-16 | End: 2022-05-24

## 2022-05-16 RX ORDER — CYCLOBENZAPRINE HCL 10 MG
10 TABLET ORAL 2 TIMES DAILY PRN
Qty: 6 TABLET | Refills: 0 | Status: SHIPPED | OUTPATIENT
Start: 2022-05-16 | End: 2022-05-22

## 2022-05-16 RX ADMIN — KETOROLAC TROMETHAMINE 30 MG: 30 INJECTION, SOLUTION INTRAMUSCULAR at 17:56

## 2022-05-16 RX ADMIN — OLANZAPINE 5 MG: 5 TABLET, ORALLY DISINTEGRATING ORAL at 19:59

## 2022-05-16 NOTE — ED NOTES
Bed: HW06  Expected date:   Expected time:   Means of arrival:   Comments:   22 F back pain Triage?
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
## 2022-05-16 NOTE — ED TRIAGE NOTES
Triage Assessment     Row Name 05/16/22 6271       Triage Assessment (Adult)    Airway WDL WDL       Respiratory WDL    Respiratory WDL WDL       Skin Circulation/Temperature WDL    Skin Circulation/Temperature WDL WDL       Cardiac WDL    Cardiac WDL WDL       Peripheral/Neurovascular WDL    Peripheral Neurovascular WDL WDL       Cognitive/Neuro/Behavioral WDL    Cognitive/Neuro/Behavioral WDL WDL
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
## 2022-05-17 ENCOUNTER — HOSPITAL ENCOUNTER (EMERGENCY)
Facility: CLINIC | Age: 22
Discharge: HOME | End: 2022-05-17
Attending: EMERGENCY MEDICINE | Admitting: EMERGENCY MEDICINE

## 2022-05-17 ENCOUNTER — NURSE TRIAGE (OUTPATIENT)
Dept: NURSING | Facility: CLINIC | Age: 22
End: 2022-05-17
Payer: MEDICARE

## 2022-05-17 VITALS
DIASTOLIC BLOOD PRESSURE: 80 MMHG | HEART RATE: 83 BPM | RESPIRATION RATE: 16 BRPM | OXYGEN SATURATION: 99 % | SYSTOLIC BLOOD PRESSURE: 138 MMHG | TEMPERATURE: 98.4 F

## 2022-05-17 DIAGNOSIS — R45.851 SUICIDAL IDEATION: ICD-10-CM

## 2022-05-17 DIAGNOSIS — S33.5XXA LUMBAR SPRAIN, INITIAL ENCOUNTER: ICD-10-CM

## 2022-05-17 PROCEDURE — 250N000013 HC RX MED GY IP 250 OP 250 PS 637: Performed by: EMERGENCY MEDICINE

## 2022-05-17 PROCEDURE — 99283 EMERGENCY DEPT VISIT LOW MDM: CPT

## 2022-05-17 PROCEDURE — 99284 EMERGENCY DEPT VISIT MOD MDM: CPT | Performed by: EMERGENCY MEDICINE

## 2022-05-17 PROCEDURE — 90791 PSYCH DIAGNOSTIC EVALUATION: CPT

## 2022-05-17 PROCEDURE — 99285 EMERGENCY DEPT VISIT HI MDM: CPT | Mod: 25

## 2022-05-17 RX ORDER — IBUPROFEN 800 MG/1
800 TABLET, FILM COATED ORAL ONCE
Status: COMPLETED | OUTPATIENT
Start: 2022-05-17 | End: 2022-05-17

## 2022-05-17 RX ORDER — CYCLOBENZAPRINE HCL 10 MG
10 TABLET ORAL 3 TIMES DAILY
Status: DISCONTINUED | OUTPATIENT
Start: 2022-05-17 | End: 2022-05-17 | Stop reason: HOSPADM

## 2022-05-17 RX ADMIN — CYCLOBENZAPRINE 10 MG: 10 TABLET, FILM COATED ORAL at 13:36

## 2022-05-17 RX ADMIN — IBUPROFEN 800 MG: 800 TABLET, FILM COATED ORAL at 12:33

## 2022-05-17 NOTE — DISCHARGE INSTRUCTIONS
Discharge back to group home with plans to utilize ibuprofen 3 times a day and short course of Flexeril if there is increased muscle spasm tomorrow.

## 2022-05-17 NOTE — DISCHARGE INSTRUCTIONS
Thank you for coming to the [facility] Emergency Department.     For your back:  Use ibuprofen 800mg every 8 hours for pain, and flexeril twice daily. Expect your back to start to feel better in 3-4 days, and hopefully it will be fully gone by 10 days. Please schedule a follow up appointment with your primary care doctor if not better in 10 days, so they can refer you to physical therapy or other services if not improving.   It will help to apply warm packs to the back, or take warm showers/baths.   Gently stretch your back muscles every day and try your best to keep moving and walking as much as you are able.     Return to the ER if you get weakness in the legs or difficulty passing urine or stool.     Aftercare Plan    If I am feeling unsafe or I am in a crisis, I will:   Contact my established care providers   Call the National Suicide Prevention Lifeline: 272.373.3583   Go to the nearest emergency room   Call 911     Warning signs that I or other people might notice when a crisis is developing for me: any increased thoughts of hurting yourself or others    Things I am able to do on my own to cope or help me feel better: Grounding Techniques:  Try to notice where you are, your surroundings including the people, the sounds like the TV or radio.  Concentrate on your breathing. Take a deep cleansing breath from your diaphragm. Count the breaths as you exhale. Make sure you breath slowly.  Hold something that you find comforting, for some it may be a stuffed animal or a blanket. Notice how it feels in your hands. Is it hard or soft?  During a non-crisis time make a list of positive affirmations. Print them out and keep them handy for times of intense anxiety. At those times, read them aloud.  Try the 9GAG game:  Name 5 things you can see in the room with you  Name 4 things you can feel ( chair on my back  or  feet on floor )   Name 3 things you can hear right now ( people talking  or   tv )   Name 2 things you can smell right now (or, 2 things you like the smell of)   Name 1 good thing about yourself  Create A Safe Place  Image a safe place -- it can be a real or imaginary place:   What do you see -- especially colors?   What sounds do you hear?   What sensations do you feel?   What smells do you smell?   What people or animals would you want in your safe place?   Imagine a protective bubble, wall or boundary around your safe place.   Imagine a door or gate with a guard at your safe place.   Image a lock and key to your safe place and only you can unlock it.  You can draw or make a collage that represents your safe place.   Choose a souvenir of your safe place -- a color, an object, a song.   Keep your image of your safe place so you can come back to it when you need to.     Things that I am able to do with others to cope or help me better: let group home staff know if you are needing more support     Things I can use or do for distraction: Reduce Extreme Emotion  QUICKLY:  Changing Your Body Chemistry      T:  Change your body Temperature to change your autonomic nervous system   Use Ice Water to calm yourself down FAST   Put your face in a bowl of ice water (this is the best way; have the person keep his/her face in ice water for 30-45 seconds - initial research is showing that the longer s/he can hold her/his face in the water, the better the response), or   Splash ice water on your face, or hold an ice pack on your face      I:  Intensely exercise to calm down a body revved up by emotion   Examples: running, walking fast, jumping, playing basketball, weight lifting, swimming, calisthenics, etc.   Engage in exercises that DO NOT include violent behaviors. Exercises that utilize violent behaviors tend to function as  behavioral rehearsal,  and rather than calming the person down, may actually  rev  the person up more, increasing the likelihood of violence, and lessening the likelihood that they  will  burn off  energy     P:  Progressively relax your muscles   Starting with your hands, moving to your forearms, upper arms, shoulders, neck, forehead, eyes, cheeks and lips, tongue and teeth, chest, upper back, stomach, buttocks, thighs, calves, ankles, feet   Tense (10 seconds,   of the way), then relax each muscle (all the way)   Notice the tension   Notice the difference when relaxed (by tensing first, and then relaxing, you are able to get a more thorough relaxation than by simply relaxing)     P: Paced breathing to relax   The standard technique is to begin with counting the number of steps one takes for a typical inhale, then counting the steps one takes for a typical exhale, and then lengthening the amount of steps for the exhalation by one or two steps.  OR  Repeat this pattern for 1-2 minutes  Inhale for four (4) seconds   Exhale for six (6) to eight (8) seconds   Research demonstrated that one can change one's overall level of anxiety by doing this exercise for even a few minutes per day     Changes I can make to support my mental health and wellness: please take your medication as prescribed for your mental health and back pain     People in my life that I can ask for help: group home with support from primary care provider, therapist, medication management, , [facility] worker, nurse.     Your [facility] has a mental health crisis team you can call 24/7: [facility]  [phone] (adults)  [phone] (children)    Additional resources and information:     Crisis Lines  Crisis Text Line  Text 542183  You will be connected with a trained live crisis counselor to provide support.    Por espanol, texto  SIRENA a 197980 o texto a 442-AYUDAME en WhatsApp    [employer] (LGBTQ Youth Crisis Line)  1.635.005.9332  text START to 389-484      Community Resources  Fast Tracker  Linking people to mental health and substance use disorder resources  [employer]n.org  "    [place] Mental [facility] Warm Line  Peer to peer support  Monday thru Saturday, 12 pm to 10 pm  861.715.3663 or 7.113.373.5160  Text \"Support\" to 33443    National [place] [employer] Mental Illness (MAGY)  829.729.4207 or 1.888.MAGY.HELPS      Mental Health Apps  My3  https://[email or web address].Habbo/    VirtualPalringoeBox  [email or web address]/apps/virtual-hope-box/      Crisis Lines  Crisis Text Line  Text 721334  You will be connected with a trained live crisis counselor to provide support.    Por espanol, texto  SIRENA a 582874 o texto a 442-AYUDAME en WhatsApp    National Hope Line  1.800.SUICIDE [2554049]      Community Resources  Fast Tracker  Linking people to mental health and substance use disorder resources  [employer].org     [place] Mental [facility] Warm Line  Peer to peer support  Monday thru Saturday, 12 pm to 10 pm  815.548.4737 or 3.375.208.2713  Text \"Support\" to 18676    [employer] [place] [employer]ness (MAGY)  084.377.7038 or 1.888.MAGY.HELPS      Mental Health Apps  My3  https://[employer].Habbo/    Glyde  https://[email or web address]/apps/virtual-hope-box/      Additional Information  Today you were seen by a licensed mental health professional through Triage and Transition services, [facility] ([facility])  for a crisis assessment in the Emergency Department at [facility].  It is recommended that you follow up with your established providers (psychiatrist, mental health therapist, and/or primary care doctor - as relevant) as soon as possible. Coordinators from [facility] will be calling you in the next 24-48 hours to ensure that you have the resources you need.  You can also contact [facility] coordinators directly at [phone]. You may have been scheduled for or offered an appointment with a mental health provider. [facility] maintains an extensive network of licensed behavioral health providers to connect patients with the services they need.  We do not charge providers a fee to " participate in our referral network.  We match patients with providers based on a patient's specific needs, insurance coverage, and location.  Our first effort will be to refer you to a provider within your care system, and will utilize providers outside your care system as needed.
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
## 2022-05-17 NOTE — ED TRIAGE NOTES
Triage Assessment     Row Name 05/17/22 1157       Triage Assessment (Adult)    Airway WDL WDL       Respiratory WDL    Respiratory WDL WDL       Skin Circulation/Temperature WDL    Skin Circulation/Temperature WDL WDL       Cardiac WDL    Cardiac WDL WDL       Peripheral/Neurovascular WDL    Peripheral Neurovascular WDL WDL       Cognitive/Neuro/Behavioral WDL    Cognitive/Neuro/Behavioral WDL WDL

## 2022-05-17 NOTE — ED NOTES
Report given to group home nurse. She confirmed the address we have on file is the correct one to send her to.

## 2022-05-18 ENCOUNTER — NURSE TRIAGE (OUTPATIENT)
Dept: NURSING | Facility: CLINIC | Age: 22
End: 2022-05-18
Payer: MEDICARE

## 2022-05-18 ENCOUNTER — PATIENT OUTREACH (OUTPATIENT)
Dept: CARE COORDINATION | Facility: CLINIC | Age: 22
End: 2022-05-18
Payer: MEDICARE

## 2022-05-18 DIAGNOSIS — Z71.89 OTHER SPECIFIED COUNSELING: ICD-10-CM

## 2022-05-18 NOTE — PROGRESS NOTES
Clinic Care Coordination Contact    Background: Care Coordination referral placed from [facility] discharge report for reason of patient meeting criteria for a TCM outreach call by [facility] team.    Assessment: Upon chart review, CCRC Team member will cancel/close the referral for TCM outreach due to reason below:    Patient has discharged to a Group home, Memory Care or Nursing Home    Plan: Care Coordination referral for TCM outreach canceled.    Isa Davidson RN  [facility] Resource [facility]
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
## 2022-05-24 ENCOUNTER — ANCILLARY PROCEDURE (OUTPATIENT)
Dept: CT IMAGING | Facility: CLINIC | Age: 22
End: 2022-05-24
Attending: INTERNAL MEDICINE
Payer: MEDICARE

## 2022-05-24 DIAGNOSIS — R31.9 HEMATURIA, UNSPECIFIED TYPE: ICD-10-CM

## 2022-05-24 PROCEDURE — 74176 CT ABD & PELVIS W/O CONTRAST: CPT | Performed by: RADIOLOGY

## 2022-05-27 ENCOUNTER — HOSPITAL ENCOUNTER (EMERGENCY)
Facility: CLINIC | Age: 22
Discharge: HOME | End: 2022-05-28
Attending: EMERGENCY MEDICINE | Admitting: EMERGENCY MEDICINE
Payer: MEDICARE

## 2022-05-27 ENCOUNTER — APPOINTMENT (OUTPATIENT)
Dept: GENERAL RADIOLOGY | Facility: CLINIC | Age: 22
End: 2022-05-27
Attending: PHYSICIAN ASSISTANT
Payer: MEDICARE

## 2022-05-27 DIAGNOSIS — Z20.822 COVID-19 RULED OUT BY LABORATORY TESTING: ICD-10-CM

## 2022-05-27 DIAGNOSIS — R11.2 NON-INTRACTABLE VOMITING WITH NAUSEA, UNSPECIFIED VOMITING TYPE: ICD-10-CM

## 2022-05-27 DIAGNOSIS — R07.9 CHEST PAIN, UNSPECIFIED TYPE: ICD-10-CM

## 2022-05-27 LAB
ALBUMIN SERPL-MCNC: 4.6 G/DL (ref 3.4–5)
ALP SERPL-CCNC: 63 U/L (ref 40–150)
ALT SERPL W P-5'-P-CCNC: 26 U/L (ref 0–50)
ANION GAP SERPL CALCULATED.3IONS-SCNC: 10 MMOL/L (ref 3–14)
AST SERPL W P-5'-P-CCNC: 18 U/L (ref 0–45)
BASOPHILS # BLD AUTO: 0.1 10E3/UL (ref 0–0.2)
BASOPHILS NFR BLD AUTO: 1 %
BILIRUB SERPL-MCNC: 0.2 MG/DL (ref 0.2–1.3)
BUN SERPL-MCNC: 14 MG/DL (ref 7–30)
CALCIUM SERPL-MCNC: 10.1 MG/DL (ref 8.5–10.1)
CHLORIDE BLD-SCNC: 110 MMOL/L (ref 94–109)
CO2 SERPL-SCNC: 23 MMOL/L (ref 20–32)
CREAT SERPL-MCNC: 0.65 MG/DL (ref 0.52–1.04)
EOSINOPHIL # BLD AUTO: 0.3 10E3/UL (ref 0–0.7)
EOSINOPHIL NFR BLD AUTO: 3 %
ERYTHROCYTE [DISTWIDTH] IN BLOOD BY AUTOMATED COUNT: 13.2 % (ref 10–15)
FLUAV RNA SPEC QL NAA+PROBE: NEGATIVE
FLUBV RNA RESP QL NAA+PROBE: NEGATIVE
GFR SERPL CREATININE-BSD FRML MDRD: >90 ML/MIN/1.73M2
GLUCOSE BLD-MCNC: 84 MG/DL (ref 70–99)
HCG SERPL QL: NEGATIVE
HCT VFR BLD AUTO: 42 % (ref 35–47)
HGB BLD-MCNC: 14.6 G/DL (ref 11.7–15.7)
HOLD SPECIMEN: NORMAL
HOLD SPECIMEN: NORMAL
IMM GRANULOCYTES # BLD: 0 10E3/UL
IMM GRANULOCYTES NFR BLD: 0 %
LIPASE SERPL-CCNC: 156 U/L (ref 73–393)
LYMPHOCYTES # BLD AUTO: 2.6 10E3/UL (ref 0.8–5.3)
LYMPHOCYTES NFR BLD AUTO: 26 %
MCH RBC QN AUTO: 29.4 PG (ref 26.5–33)
MCHC RBC AUTO-ENTMCNC: 34.8 G/DL (ref 31.5–36.5)
MCV RBC AUTO: 85 FL (ref 78–100)
MONOCYTES # BLD AUTO: 0.6 10E3/UL (ref 0–1.3)
MONOCYTES NFR BLD AUTO: 6 %
NEUTROPHILS # BLD AUTO: 6.4 10E3/UL (ref 1.6–8.3)
NEUTROPHILS NFR BLD AUTO: 64 %
NRBC # BLD AUTO: 0 10E3/UL
NRBC BLD AUTO-RTO: 0 /100
PLATELET # BLD AUTO: 293 10E3/UL (ref 150–450)
POTASSIUM BLD-SCNC: 4 MMOL/L (ref 3.4–5.3)
PROT SERPL-MCNC: 8.7 G/DL (ref 6.8–8.8)
RBC # BLD AUTO: 4.96 10E6/UL (ref 3.8–5.2)
RSV RNA SPEC NAA+PROBE: NEGATIVE
SARS-COV-2 RNA RESP QL NAA+PROBE: NEGATIVE
SODIUM SERPL-SCNC: 143 MMOL/L (ref 133–144)
TROPONIN I SERPL HS-MCNC: 5 NG/L
WBC # BLD AUTO: 9.9 10E3/UL (ref 4–11)

## 2022-05-27 PROCEDURE — 96360 HYDRATION IV INFUSION INIT: CPT | Performed by: EMERGENCY MEDICINE

## 2022-05-27 PROCEDURE — 71046 X-RAY EXAM CHEST 2 VIEWS: CPT

## 2022-05-27 PROCEDURE — 71046 X-RAY EXAM CHEST 2 VIEWS: CPT | Mod: 26 | Performed by: RADIOLOGY

## 2022-05-27 PROCEDURE — 250N000013 HC RX MED GY IP 250 OP 250 PS 637: Performed by: EMERGENCY MEDICINE

## 2022-05-27 PROCEDURE — 99285 EMERGENCY DEPT VISIT HI MDM: CPT | Mod: CS,25 | Performed by: EMERGENCY MEDICINE

## 2022-05-27 PROCEDURE — 83690 ASSAY OF LIPASE: CPT | Performed by: PHYSICIAN ASSISTANT

## 2022-05-27 PROCEDURE — 36415 COLL VENOUS BLD VENIPUNCTURE: CPT | Performed by: EMERGENCY MEDICINE

## 2022-05-27 PROCEDURE — 258N000003 HC RX IP 258 OP 636: Performed by: EMERGENCY MEDICINE

## 2022-05-27 PROCEDURE — 80053 COMPREHEN METABOLIC PANEL: CPT | Performed by: PHYSICIAN ASSISTANT

## 2022-05-27 PROCEDURE — 258N000003 HC RX IP 258 OP 636: Performed by: PHYSICIAN ASSISTANT

## 2022-05-27 PROCEDURE — 87637 SARSCOV2&INF A&B&RSV AMP PRB: CPT | Mod: 59 | Performed by: EMERGENCY MEDICINE

## 2022-05-27 PROCEDURE — 87637 SARSCOV2&INF A&B&RSV AMP PRB: CPT | Performed by: EMERGENCY MEDICINE

## 2022-05-27 PROCEDURE — 93010 ELECTROCARDIOGRAM REPORT: CPT | Performed by: EMERGENCY MEDICINE

## 2022-05-27 PROCEDURE — 99284 EMERGENCY DEPT VISIT MOD MDM: CPT | Mod: CS,25 | Performed by: EMERGENCY MEDICINE

## 2022-05-27 PROCEDURE — 82040 ASSAY OF SERUM ALBUMIN: CPT | Performed by: PHYSICIAN ASSISTANT

## 2022-05-27 PROCEDURE — 84703 CHORIONIC GONADOTROPIN ASSAY: CPT | Performed by: PHYSICIAN ASSISTANT

## 2022-05-27 PROCEDURE — 80178 ASSAY OF LITHIUM: CPT | Performed by: EMERGENCY MEDICINE

## 2022-05-27 PROCEDURE — 96361 HYDRATE IV INFUSION ADD-ON: CPT | Performed by: EMERGENCY MEDICINE

## 2022-05-27 PROCEDURE — C9803 HOPD COVID-19 SPEC COLLECT: HCPCS | Performed by: EMERGENCY MEDICINE

## 2022-05-27 PROCEDURE — 99284 EMERGENCY DEPT VISIT MOD MDM: CPT | Mod: CS | Performed by: EMERGENCY MEDICINE

## 2022-05-27 PROCEDURE — 84484 ASSAY OF TROPONIN QUANT: CPT | Performed by: PHYSICIAN ASSISTANT

## 2022-05-27 PROCEDURE — 85025 COMPLETE CBC W/AUTO DIFF WBC: CPT | Performed by: PHYSICIAN ASSISTANT

## 2022-05-27 PROCEDURE — 93005 ELECTROCARDIOGRAM TRACING: CPT | Performed by: EMERGENCY MEDICINE

## 2022-05-27 RX ORDER — OLANZAPINE 5 MG/1
10 TABLET, ORALLY DISINTEGRATING ORAL ONCE
Status: COMPLETED | OUTPATIENT
Start: 2022-05-27 | End: 2022-05-27

## 2022-05-27 RX ORDER — PANTOPRAZOLE SODIUM 40 MG/1
40 TABLET, DELAYED RELEASE ORAL DAILY
Qty: 14 TABLET | Refills: 0 | Status: SHIPPED | OUTPATIENT
Start: 2022-05-27 | End: 2022-06-10

## 2022-05-27 RX ORDER — SODIUM CHLORIDE, SODIUM LACTATE, POTASSIUM CHLORIDE, CALCIUM CHLORIDE 600; 310; 30; 20 MG/100ML; MG/100ML; MG/100ML; MG/100ML
1000 INJECTION, SOLUTION INTRAVENOUS CONTINUOUS
Status: DISCONTINUED | OUTPATIENT
Start: 2022-05-27 | End: 2022-05-28 | Stop reason: HOSPADM

## 2022-05-27 RX ORDER — METOCLOPRAMIDE 5 MG/1
10 TABLET ORAL ONCE
Status: COMPLETED | OUTPATIENT
Start: 2022-05-27 | End: 2022-05-27

## 2022-05-27 RX ORDER — OLANZAPINE 10 MG/2ML
10 INJECTION, POWDER, FOR SOLUTION INTRAMUSCULAR ONCE
Status: DISCONTINUED | OUTPATIENT
Start: 2022-05-27 | End: 2022-05-28 | Stop reason: HOSPADM

## 2022-05-27 RX ORDER — ACETAMINOPHEN 500 MG
1000 TABLET ORAL ONCE
Status: DISCONTINUED | OUTPATIENT
Start: 2022-05-27 | End: 2022-05-28 | Stop reason: HOSPADM

## 2022-05-27 RX ADMIN — SODIUM CHLORIDE 1000 ML: 9 INJECTION, SOLUTION INTRAVENOUS at 19:12

## 2022-05-27 RX ADMIN — SODIUM CHLORIDE, POTASSIUM CHLORIDE, SODIUM LACTATE AND CALCIUM CHLORIDE 1000 ML: 600; 310; 30; 20 INJECTION, SOLUTION INTRAVENOUS at 21:32

## 2022-05-27 RX ADMIN — OLANZAPINE 10 MG: 5 TABLET, ORALLY DISINTEGRATING ORAL at 23:15

## 2022-05-27 RX ADMIN — METOCLOPRAMIDE 10 MG: 5 TABLET ORAL at 21:33

## 2022-05-27 NOTE — ED TRIAGE NOTES
Patient arrived via medic  [facility] clinic   C/o of nausea   4 Zofran given medic  Clinic concern for Dehydration     medic /72, blood glucose 83

## 2022-05-27 NOTE — ED TRIAGE NOTES
Patient from group home. Arrived via medic from clinic    Patient c/o of two days of nausea and vomiting   Also abdominal pain.   Clinic concern for dehydration     Medic administer 4 oral Zofran, and suppository given at clinic. VSS     Triage Assessment     Row Name 05/27/22 6843       Triage Assessment (Adult)    Airway WDL WDL       Respiratory WDL    Respiratory WDL WDL       Skin Circulation/Temperature WDL    Skin Circulation/Temperature WDL WDL       Cardiac WDL    Cardiac WDL WDL       Peripheral/Neurovascular WDL    Peripheral Neurovascular WDL WDL       Cognitive/Neuro/Behavioral WDL    Cognitive/Neuro/Behavioral WDL WDL

## 2022-05-28 ENCOUNTER — NURSE TRIAGE (OUTPATIENT)
Dept: NURSING | Facility: CLINIC | Age: 22
End: 2022-05-28
Payer: MEDICARE

## 2022-05-28 VITALS
TEMPERATURE: 98.8 F | SYSTOLIC BLOOD PRESSURE: 145 MMHG | HEART RATE: 75 BPM | RESPIRATION RATE: 14 BRPM | DIASTOLIC BLOOD PRESSURE: 65 MMHG | OXYGEN SATURATION: 100 %

## 2022-05-28 LAB
ATRIAL RATE - MUSE: 86 BPM
DIASTOLIC BLOOD PRESSURE - MUSE: NORMAL MMHG
INTERPRETATION ECG - MUSE: NORMAL
LITHIUM SERPL-SCNC: <0.2 MMOL/L
P AXIS - MUSE: 46 DEGREES
PR INTERVAL - MUSE: 186 MS
QRS DURATION - MUSE: 88 MS
QT - MUSE: 362 MS
QTC - MUSE: 433 MS
R AXIS - MUSE: 26 DEGREES
SYSTOLIC BLOOD PRESSURE - MUSE: NORMAL MMHG
T AXIS - MUSE: 5 DEGREES
VENTRICULAR RATE- MUSE: 86 BPM

## 2022-05-28 PROCEDURE — 250N000013 HC RX MED GY IP 250 OP 250 PS 637: Performed by: EMERGENCY MEDICINE

## 2022-05-28 RX ORDER — OLANZAPINE 5 MG/1
10 TABLET, ORALLY DISINTEGRATING ORAL ONCE
Status: COMPLETED | OUTPATIENT
Start: 2022-05-28 | End: 2022-05-28

## 2022-05-28 RX ORDER — ONDANSETRON 8 MG/1
8 TABLET, ORALLY DISINTEGRATING ORAL ONCE
Status: DISCONTINUED | OUTPATIENT
Start: 2022-05-28 | End: 2022-05-28

## 2022-05-28 RX ADMIN — OLANZAPINE 10 MG: 5 TABLET, ORALLY DISINTEGRATING ORAL at 04:43

## 2022-05-28 NOTE — ED NOTES
During hourly nursing rounding, patient safety sitter at patient bedside trying to stop patient from injuring self. Patient biting self, striking self in head with fists stating that she wants to die and stating that she is trying to hurt self.

## 2022-05-28 NOTE — PROGRESS NOTES
DEC assessment, with the assistance of ED nursing, attempted to meet with patient to assess via telehealth, however patient was not arousable to complete the interview. In conversation with ED physician (Dr. Vicente), they would prefer the assessment request remain in place at this time.  DEC assessment will attempt at a later time.

## 2022-05-28 NOTE — ED NOTES
6:59 AM patient was signed out to me pending DEC assessment.  Initial attempt was unsuccessful as the patient was sleeping and refused to participate.  They will try again later in the morning.  She will be signed out to the next incoming physician.  It was the impression of the first physician that this is frequent and is likely related to getting respite from the group home.     Saul Vicente MD  05/28/22 0702    7:21 AM I spoke to the patient and she is ready to be discharged home she is not suicidal she is not happy about going back this seems to be the pattern from review of the chart.  Says she came in with nausea vomiting and reported suicidality when she realized she was going to be discharged.     Saul Vicente MD  05/28/22 0721

## 2022-05-28 NOTE — ED PROVIDER NOTES
ED Triage Provider Note  [facility]  Encounter Date: May 27, 2022    History:  Chief Complaint   Patient presents with     Nausea & Vomiting     Sadaf Ross is a 21 year old female past medical history significant for intellectual disability, borderline personality disorder, bipolar affective disorder who presents to the ED with concerns for shortness of breath, chest pain, generalized abdominal pain, nausea and vomiting.  Patient presents after being seen today in her psychiatric clinic.  She states that she woke up this morning with generalized abdominal pain, nausea and vomiting, which pain and shortness of breath.  She reports she had about 20 episodes of emesis today prior to coming into the ER.  She denies any associated fevers.  She denies any cough.  She denies any diarrhea.  No urinary symptoms.  No concern for pregnancy, last jose menstrual period middle of April.    Patient states that she does sometimes get short of breath and developed chest pain when she is anxious, and also does have chronic chest pain since when her father passed away.    Review of Systems:  Negative for fever, diarrhea, urinary symptoms    Exam:  BP (!) 145/65   Pulse 75   Temp 98.8  F (37.1  C)   Resp 14   LMP 05/04/2022   SpO2 100%   General: No acute distress. Appears stated age.   Cardio: Regular rate, extremities well perfused  Resp: Normal work of breathing, grossly normal respiratory rate  Abdominal: Abdomen soft nontender nondistended with distraction.  Without distraction, patient does have some notable wincing generalized throughout without any focal tenderness.  Neuro: Alert. CN II-XII grossly intact. Grossly intact strength.       Medical Decision Making:  Patient arriving to the ED with problem as above. A medical screening exam was performed.  CBC CMP lipase troponin chest x-ray EKG urinalysis beta-hCG 1 L normal saline orders initiated from Triage. The patient  is appropriate to wait in triage.      Christine Murphy MD on 5/27/2022 at 7:02 PM    --    ED Attending Physician Attestation    I Christine Murphy MD, cared for this patient with the Advanced Practice Provider (BETHANY). I have performed a history and physical examination of the patient independent of the BETHANY. I reviewed the BETHANY's documentation above and agree with the documented findings and plan of care. I personally provided a substantive portion of the care for this patient.  I personally performed the substantive portion of the medical decision making for this visit - please see the BETHANY's documentation for full details.      Key management decisions made by me and carried out under my direction:   20 yo F presenting with nausea, vomiting and near syncope. Also reported abdominal pain, chest pain.   IV fluid administration, lab testing, PO challenge, COVID-19 testing.   I personally performed the substantive portion of the history for this visit - please see the BETHANY's documentation for full details.    Key additional history findings made by me: Pt did note chills, cough today, dizziness in clinic. Denies known COVID-19 exposure. Has not had COVID infection during the pandemic. Has been seen in primary care recently. Started on oral antibiotics for UTI due to symptoms of hematuria, on cephalexin. Pt is also taking ibuprofen for back pain. Had outpatient CT A/P 5/24 that was negative for stones, and no other intraabdominal findings to explain her pain.     I personally performed the substantive portion of the physical exam - please see the BETHANY's documentation for full details.    I concur with the BETHANY exam findings, in addition I have noted:  HEENT:PERRL, no facial tenderness or wounds, head atraumatic, oropharynx clear, mucous membranes moist, TMs clear bilaterally  Neck:no bony tenderness or step offs, no JVD, trachea midline  Back: no CVA tenderness  CV:RRR without murmurs  PULM:Clear to auscultation  bilaterally  Abd:soft, nontender, nondistended. Bowel sounds present and normal. Negative Santana's sign  UE:No traumatic injuries, skin normal  LE:no traumatic injuries, skin normal, no LE edema.   Neuro:CN II-XII intact, strength 5/5 throughout, gait stable, coordination normal. Mentation at baseline from when I have seen her previously.   Skin: no rashes or ecchymoses      Summary of HPI, PE, ED Course   Patient is a 21 year old female evaluated in the emergency department for 1 day of nausea, vomiting and feeling warm. Exam unremarkable. ED course notable for normal CBC, CMP, lipase, negative HCG. EKG without acute ischemic abnormalities and negative troponin. COVID and influenza negative. CXR reviewed by me and radiology report read. Clear lungs, normal heart size. Pt attempted to elope from the ED out the front door. Was convinced to return by ED staff. Reported desire to die and suicidal thoughts without clear plan. Treated with zyprexa ODT 10mg PO, continued on 1:1 monitoring while awaiting DEC assessment.     Signed out to Dr. Vicente at 2:30AM pending DEC assessment.       Critical Care & Procedures  Not applicable.    Medical Decision Making  The medical record was reviewed and interpreted.  Current labs reviewed and interpreted.  Current images reviewed and interpreted: CXR, see above.      Christine Murphy MD  Emergency Medicine            Christine Murphy MD  05/28/22 0783       EKG Interpretation:      Interpreted by Christine Murphy MD  Time reviewed: 18:19  Symptoms at time of EKG: chest pain  Rhythm: normal sinus   Rate:  86  Axis: NORMAL  Ectopy: none  Conduction: normal  ST Segments/ T Waves: No ST-T wave changes  Q Waves: none  Comparison to prior: Unchanged from March 12, 2022    Clinical Impression: normal EKG       Christine Murphy MD  05/28/22 0125

## 2022-05-28 NOTE — ED NOTES
Bed: [facility]  Expected date:   Expected time:   Means of arrival:   Comments:  triage

## 2022-05-28 NOTE — TELEPHONE ENCOUNTER
"Nurse Triage SBAR    Is this a 2nd Level Triage? NO    Situation: Triage call    Background: Patient, navin Talavera.   Pt was seen in the ED yesterday for nausea and vomiting.  Discharged this AM.     Assessment:   Pt continues to vomit despite frequent sips of fluids.  She has vomited nine times since ED discharge.  Last void was yesterday.      She then tells me that she fainted 5-10 minutes ago, states \"I'm not doing well.\"     Chest pain and SOB present.     Protocol Recommended Disposition: Call 911    Recommendation: Call 911.  Patient verbalizes understanding and agrees with plan of care.     COVID 19 Nurse Triage Plan/Patient Instructions    Please be aware that novel coronavirus (COVID-19) may be circulating in the community. If you develop symptoms such as fever, cough, or SOB or if you have concerns about the presence of another infection including coronavirus (COVID-19), please contact your health care provider or visit [email or web address].     Disposition/Instructions    Call to EMS/911 recommended. Follow protocol based instructions.     Bring Your Own Device:  Please also bring your smart device(s) (smart phones, tablets, laptops) and their charging cables for your personal use and to communicate with your care team during your visit.    Thank you for taking steps to prevent the spread of this virus.  o Limit your contact with others.  o Wear a simple mask to cover your cough.  o Wash your hands well and often.    Resources    [facility]: About COVID-19: [email or web address]    CDC: What to Do If You're Sick: www.cdc.gov/coronavirus/2019-ncov/about/steps-when-sick.html    CDC: Ending Home Isolation: www.cdc.gov/coronavirus/2019-ncov/hcp/disposition-in-home-patients.html     CDC: Caring for Someone: www.cdc.gov/coronavirus/2019-ncov/if-you-are-sick/care-for-someone.html     [facility]: Interim Guidance for Hospital Discharge to Home: " www.health.[facility].[place].us/diseases/coronavirus/hcp/hospdischarge.pdf    [facility] clinical trials (COVID-19 research studies): [email or web address]/umn-clinical-trials     Below are the COVID-19 hotlines at the [place] Department of Health ([facility]). Interpreters are available.   o For health questions: Call 723-885-4612 or 1-914.570.5169 (7 a.m. to 7 p.m.)  o For questions about schools and childcare: Call 903-736-6355 or 1-388.310.1708 (7 a.m. to 7 p.m.)     Maria L Gr RN  [facility] Nurse Advisor      Reason for Disposition    Difficulty breathing    Additional Information    Negative: Shock suspected (e.g., cold/pale/clammy skin, too weak to stand, low BP, rapid pulse)    Negative: Difficult to awaken or acting confused (e.g., disoriented, slurred speech)    Negative: Sounds like a life-threatening emergency to the triager    Negative: Still unconscious    Negative: Difficult to awaken or acting confused (e.g., disoriented, slurred speech)    Negative: Shock suspected (e.g., cold/pale/clammy skin, too weak to stand, low BP, rapid pulse)    Protocols used: [id number]-EM, VOMITING-A-AH

## 2022-05-28 NOTE — DISCHARGE INSTRUCTIONS
Thank you for coming to the [facility] Emergency Department.     You likely have side effects of using ibuprofen and antibiotics, which are common causes of abdominal pain, nausea, vomiting, and antibiotics can also cause diarrhea.     Continue your preferred nausea medication as needed.   Please stop using ibuprofen.   Take the antacid protonix daily for 14 days.   Continue the antibiotics until the course is done.  Follow up in your primary care clinic if not feeling better in 3 days.

## 2022-05-29 ENCOUNTER — NURSE TRIAGE (OUTPATIENT)
Dept: NURSING | Facility: CLINIC | Age: 22
End: 2022-05-29
Payer: MEDICARE

## 2022-05-29 NOTE — TELEPHONE ENCOUNTER
Cough and shortness of breath, chest pain now for four weeks. She has chest pain she rated at 9. She stated she fainted yesterday. She said she will call 911 because she doesn't have anyone to take her to the ER now.  Nancy Hoover RN  [place] Nurse Advisors      Reason for Disposition    Chest pain  (Exception: MILD central chest pain, present only when coughing)     Fainted yesterday. Pain at 9.    Additional Information    Negative: Severe difficulty breathing (e.g., struggling for each breath, speaks in single words)    Negative: Bluish (or gray) lips or face now    Negative: [1] Rapid onset of cough AND [2] has hives    Negative: Coughing started suddenly after medicine, an allergic food or bee sting    Negative: [1] Difficulty breathing AND [2] exposure to flames, smoke, or fumes    Negative: [1] Stridor AND [2] difficulty breathing    Negative: Sounds like a life-threatening emergency to the triager    Negative: Choked on object of food that could be caught in the throat    Negative: Chest pain is main symptom    Negative: [1] Previous asthma attacks AND [2] this feels like asthma attack    Negative: Cough lasts > 3 weeks    Negative: Wet (productive) cough (i.e., white-yellow, yellow, green, or kenneth colored sputum)    Negative: [1] Dry (non-productive) cough AND [2] within 14 days of COVID-19 Exposure    Protocols used: COUGH - ACUTE NON-PRODUCTIVE-A-AH

## 2022-05-30 ENCOUNTER — NURSE TRIAGE (OUTPATIENT)
Dept: NURSING | Facility: CLINIC | Age: 22
End: 2022-05-30
Payer: MEDICARE

## 2022-05-30 ENCOUNTER — HOSPITAL ENCOUNTER (EMERGENCY)
Facility: CLINIC | Age: 22
Discharge: HOME | End: 2022-05-30
Attending: FAMILY MEDICINE | Admitting: FAMILY MEDICINE
Payer: MEDICARE

## 2022-05-30 VITALS
DIASTOLIC BLOOD PRESSURE: 85 MMHG | SYSTOLIC BLOOD PRESSURE: 155 MMHG | RESPIRATION RATE: 20 BRPM | HEART RATE: 96 BPM | TEMPERATURE: 98.6 F | OXYGEN SATURATION: 96 %

## 2022-05-30 DIAGNOSIS — F32.A DEPRESSION, UNSPECIFIED DEPRESSION TYPE: ICD-10-CM

## 2022-05-30 DIAGNOSIS — F79 INTELLECTUAL DISABILITY: Chronic | ICD-10-CM

## 2022-05-30 DIAGNOSIS — R45.1 AGITATION: ICD-10-CM

## 2022-05-30 DIAGNOSIS — F60.3 BORDERLINE PERSONALITY DISORDER (H): Chronic | ICD-10-CM

## 2022-05-30 LAB
AMPHETAMINES UR QL SCN: NORMAL
BARBITURATES UR QL: NORMAL
BENZODIAZ UR QL: NORMAL
CANNABINOIDS UR QL SCN: NORMAL
COCAINE UR QL: NORMAL
HCG UR QL: NEGATIVE
OPIATES UR QL SCN: NORMAL

## 2022-05-30 PROCEDURE — 80307 DRUG TEST PRSMV CHEM ANLYZR: CPT | Performed by: EMERGENCY MEDICINE

## 2022-05-30 PROCEDURE — 250N000013 HC RX MED GY IP 250 OP 250 PS 637: Performed by: FAMILY MEDICINE

## 2022-05-30 PROCEDURE — 90791 PSYCH DIAGNOSTIC EVALUATION: CPT

## 2022-05-30 PROCEDURE — 99284 EMERGENCY DEPT VISIT MOD MDM: CPT | Performed by: FAMILY MEDICINE

## 2022-05-30 PROCEDURE — 81025 URINE PREGNANCY TEST: CPT | Performed by: EMERGENCY MEDICINE

## 2022-05-30 PROCEDURE — 99285 EMERGENCY DEPT VISIT HI MDM: CPT | Mod: 25 | Performed by: FAMILY MEDICINE

## 2022-05-30 RX ORDER — OLANZAPINE 10 MG/1
10 TABLET, ORALLY DISINTEGRATING ORAL ONCE
Status: COMPLETED | OUTPATIENT
Start: 2022-05-30 | End: 2022-05-30

## 2022-05-30 RX ORDER — HYDROXYZINE HYDROCHLORIDE 50 MG/1
50 TABLET, FILM COATED ORAL ONCE
Status: COMPLETED | OUTPATIENT
Start: 2022-05-30 | End: 2022-05-30

## 2022-05-30 RX ADMIN — HYDROXYZINE HYDROCHLORIDE 50 MG: 50 TABLET, FILM COATED ORAL at 18:52

## 2022-05-30 RX ADMIN — OLANZAPINE 10 MG: 10 TABLET, ORALLY DISINTEGRATING ORAL at 18:52

## 2022-05-30 NOTE — TELEPHONE ENCOUNTER
Suicidal ideation.  Stated she is feeling sad, hopeless and unable to do normal activities.  Lives in a group home.  I instructed ER.  Caller stated understanding and agreement.  Sadaf said she doesn't have a way to get to and ER.  I advised her to call 911. I asked if she wants me to call her back. She did.  I called her back after a few minutes.    I asked if she had called 911. She said she hadn't because she has company right now but said she'd call 911 later.    I asked her if she is okay for now. She said she is. I asked if she felt comfortable enough to tell her friend that she is having suicidal ideation and it was recommended that she go to an ER and ask her friend to drive her.  She said no and again said she'll call 911 later.  I asked for assurance again that she is fine right now and that she still plans to call 911.  She said she is and added she felt the same yesterday.        Reason for Disposition    [1] Depression symptoms (sadness, hopelessness, decreased energy) AND [2] unable to do any normal activities (e.g., self care, school, work; in comparison to baseline).    Additional Information    Negative: Patient attempted suicide    Negative: Patient is threatening suicide now    Negative: Violent behavior, or threatening to physically hurt or kill someone    Negative: [1] Patient is very confused (disoriented, slurred speech) AND [2] no other adult (e.g., friend or family member) available    Negative: [1] Difficult to awaken or acting very confused (disoriented, slurred speech) AND [2] new onset    Negative: Sounds like a life-threatening emergency to the triager    Protocols used: SUICIDE [id number] CISNEROS RN [place] Nurse Advisors

## 2022-05-30 NOTE — ED NOTES
Bed: HW03  Expected date: 5/30/22  Expected time: 5:25 PM  Means of arrival:   Comments:  N 716: 20 y/o, F, SI
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
## 2022-05-30 NOTE — ED TRIAGE NOTES
Pt. having suicidal thoughts and plan to punch walls or window and cause injury severe enough to die.   Pt.  approaching anniversary of fathers death and has been more depressed.  No recent self harm per EMS.

## 2022-05-30 NOTE — ED NOTES
"While patient in hallway with writer (1:1), patient started crying/sobbing. Writer tried if there was anything they could do to help her. Patient said no and continued crying.Pt stated \"I want to punch the wall\".  Writer attempted to talk with patient, patient continued crying. RN notified.    "

## 2022-05-31 NOTE — ED PROVIDER NOTES
ED Provider Note  [facility]      History     Chief Complaint   Patient presents with     Suicidal     HPI  Sadaf Ross is a 21 year old female who is well-known to the emergency room with significant past psychiatric history she currently lives in a group home today she became agitated with staff and made statements that she was going to harm them or trying to kill herself patient also has a history of significant for self-injurious behaviors patient has intellectual disability as well as borderline personality disorder and history of frequent emergency room visits.  At this time patient felt as though she was at risk and called 911 herself and had been transported here to the emergency room.  Patient will be seen and evaluated by the  as well as receiving 10 mg Zyprexa and her hydroxyzine as well.    Past Medical History  Past Medical History:   Diagnosis Date     ADHD (attention deficit hyperactivity disorder)      Bipolar 1 disorder (H)      Borderline personality disorder (H)      Depression      Depressive disorder      Intellectual disability      Obesity      Syncope      Past Surgical History:   Procedure Laterality Date     APPENDECTOMY       APPENDECTOMY       ARIPiprazole ER (ABILIFY MAINTENA) 400 MG extended release inj syringe  benztropine (COGENTIN) 0.5 MG tablet  calcium carbonate (TUMS) 500 MG chewable tablet  chlorhexidine (PERIDEX) 0.12 % solution  docusate sodium (COLACE) 100 MG capsule  hydrOXYzine (ATARAX) 50 MG tablet  metoclopramide (REGLAN) 10 MG tablet  norgestimate-ethinyl estradiol (ORTHO-CYCLEN) 0.25-35 MG-MCG tablet  OLANZapine (ZYPREXA) 2.5 MG tablet  OLANZapine zydis (ZYPREXA) 5 MG ODT  omeprazole (PRILOSEC) 40 MG DR capsule  ondansetron (ZOFRAN) 4 MG tablet  pantoprazole (PROTONIX) 40 MG EC tablet  polyethylene glycol (MIRALAX) 17 g packet  prochlorperazine (COMPAZINE) 10 MG tablet  promethazine (PHENERGAN) 25 MG suppository  venlafaxine  (EFFEXOR-XR) 150 MG 24 hr capsule  Vitamin D, Cholecalciferol, 25 MCG (1000 UT) TABS      Allergies   Allergen Reactions     Penicillins Rash and Unknown     Family History  Family History   Problem Relation Age of Onset     Diabetes Type 1 Father      Cancer Paternal Grandfather      Social History   Social History     Tobacco Use     Smoking status: Current Every Day Smoker     Packs/day: 1.00     Years: 5.00     Pack years: 5.00     Types: Cigarettes     Smokeless tobacco: Never Used   Substance Use Topics     Alcohol use: No     Drug use: No      Past medical history, past surgical history, medications, allergies, family history, and social history were reviewed with the patient. No additional pertinent items.       Review of Systems  A complete review of systems was performed with pertinent positives and negatives noted in the HPI, and all other systems negative.    Physical Exam   BP: (!) 155/85  Pulse: 96  Temp: 98.6  F (37  C)  Resp: 20  SpO2: 96 %  Physical Exam  Constitutional:       General: She is not in acute distress.     Appearance: She is not diaphoretic.   HENT:      Head: Atraumatic.      Mouth/Throat:      Pharynx: No oropharyngeal exudate.   Eyes:      General: No scleral icterus.     Pupils: Pupils are equal, round, and reactive to light.   Cardiovascular:      Heart sounds: Normal heart sounds.   Pulmonary:      Effort: No respiratory distress.      Breath sounds: Normal breath sounds.   Abdominal:      General: Bowel sounds are normal.      Palpations: Abdomen is soft.      Tenderness: There is no abdominal tenderness.   Musculoskeletal:         General: No tenderness.   Skin:     General: Skin is warm.      Findings: No rash.   Neurological:      General: No focal deficit present.      Mental Status: She is oriented to person, place, and time.      Sensory: No sensory deficit.      Motor: No weakness.      Coordination: Coordination normal.   Psychiatric:         Mood and Affect: Mood is  depressed. Affect is blunt.         Speech: Speech is delayed.         Behavior: Behavior is cooperative.         Thought Content: Thought content includes suicidal ideation. Thought content does not include suicidal plan.         ED Course      Procedures    The medical record was reviewed and interpreted.   Patient's labs were noted with negative pregnancy test negative urine tox screen.  Patient was seen and evaluated by the  please refer to their documentation in the note section of the epic chart dated 5/30/2022    Suicide assessment completed by mental health (D.E.C., LCSW, etc.)       Results for orders placed or performed during the hospital encounter of 05/30/22   HCG qualitative urine     Status: Normal   Result Value Ref Range    hCG Urine Qualitative Negative Negative   Drug abuse screen 1 urine (ED)     Status: Normal   Result Value Ref Range    Amphetamines Urine Screen Negative Screen Negative    Barbiturates Urine Screen Negative Screen Negative    Benzodiazepines Urine Screen Negative Screen Negative    Cannabinoids Urine Screen Negative Screen Negative    Cocaine Urine Screen Negative Screen Negative    Opiates Urine Screen Negative Screen Negative   Urine Drugs of Abuse Screen     Status: Normal    Narrative    The following orders were created for panel order Urine Drugs of Abuse Screen.  Procedure                               Abnormality         Status                     ---------                               -----------         ------                     Drug abuse screen 1 urin...[[id number]]  Normal              Final result                 Please view results for these tests on the individual orders.     Medications   hydrOXYzine (ATARAX) tablet 50 mg (50 mg Oral Given 5/30/22 1852)   OLANZapine zydis (zyPREXA) ODT tab 10 mg (10 mg Oral Given 5/30/22 1852)        Assessments & Plan (with Medical Decision Making)       I have reviewed the nursing notes. I have reviewed the findings,  diagnosis, plan and need for follow up with the patient.    Patient had an evaluation here in the emergency room she has diagnosis of intellectual disability borderline personality disorder depression and agitation she was seen and evaluated by the  she was given a dose of Zyprexa and Atarax and at that point in time requested discharge back to her group home and now states that she can maintain safety in the group home.  Patient will be transferred by ambulance back to her group home with follow-up with her outpatient providers as previously scheduled.    Final diagnoses:   Intellectual disability   Borderline personality disorder (H)   Depression, unspecified depression type   Agitation       --    [facility] EMERGENCY DEPARTMENT  5/30/2022     Robert Olea MD  05/30/22 [id number]

## 2022-05-31 NOTE — DISCHARGE INSTRUCTIONS
Discharge back to group home with plans to continue with current outpatient cares.  Be in contact with your county  as well.      If I am feeling unsafe or I am in a crisis, I will:    Contact my established care providers    Call the [place] Suicide Prevention Lifeline: [phone]    Go to the nearest emergency room    Call 911?   ?   ?   Warning signs that I or other people might notice when a crisis is developing for me:?Noticing my own negative thoughts and emotions, noticing increased anxiety; racing thoughts, negative thoughts about the future, excessive fear about situations or how others will feel about me. Becoming upset and not being able to identify why. Lack of motivation. Having thoughts of wanting to harm myself or suicide.       ?   Things I am able to do on my own to cope or help me feel better: Practice DBT TIPP skill (placing ice pack below eyes for a count of 30 to initiate the diver's reflex), use 5 senses to practice self-soothing, taking deep breathes, get self away from the situation causing reaction, practice challenging anxious thoughts.       ?   Things that I am able to do with others to cope or help me better: Engage in distracting conversations, talk openly about emotions, engage in distracting activities.    ?   Things I can use or do for distraction: Practice DBT TIPP skill (placing ice pack below eyes for a count of 30 to initiate the diver's reflex), use 5 senses to practice self-soothing, practice mindfulness using phone apps (Headspace, Calm)?   ?   Changes I can make to support my mental health and wellness: Establish healthy sleep hygiene routine, follow up with current therapy and be honest about all aspects of mental health,?take medications as prescribed, maintain safety in personal space, follow up with therapy and medication management.   ?   People in my life that I can ask for help: Group Home Staff, Therapist, Psychiatrist, Sister, Trusted Friend  ?   Your  [facility] has a mental health crisis team you can call 24/7:? [facility]:  [phone]       ________    These are just additional numbers you can use.         Text [place] to [phone] to be connected with a counselor who will help defuse the crisis and connect the texter to local resources. Crisis Text Line is available 24 hours a day, seven days a week.      ?   National Youth Crisis Hotline: 1-845.447.2139      [facility] COPE   Call [phone]

## 2022-06-01 ENCOUNTER — HOSPITAL ENCOUNTER (EMERGENCY)
Facility: CLINIC | Age: 22
Discharge: HOME | End: 2022-06-01
Attending: PSYCHIATRY & NEUROLOGY | Admitting: PSYCHIATRY & NEUROLOGY
Payer: MEDICARE

## 2022-06-01 VITALS
SYSTOLIC BLOOD PRESSURE: 124 MMHG | OXYGEN SATURATION: 97 % | WEIGHT: 241 LBS | RESPIRATION RATE: 16 BRPM | TEMPERATURE: 98.1 F | HEART RATE: 84 BPM | BODY MASS INDEX: 41.37 KG/M2 | DIASTOLIC BLOOD PRESSURE: 79 MMHG

## 2022-06-01 DIAGNOSIS — F43.0 ACUTE REACTION TO STRESS: ICD-10-CM

## 2022-06-01 DIAGNOSIS — F79 INTELLECTUAL DISABILITY: ICD-10-CM

## 2022-06-01 PROCEDURE — 93010 ELECTROCARDIOGRAM REPORT: CPT | Performed by: PSYCHIATRY & NEUROLOGY

## 2022-06-01 PROCEDURE — 99283 EMERGENCY DEPT VISIT LOW MDM: CPT | Mod: 25 | Performed by: PSYCHIATRY & NEUROLOGY

## 2022-06-01 PROCEDURE — 93005 ELECTROCARDIOGRAM TRACING: CPT | Performed by: PSYCHIATRY & NEUROLOGY

## 2022-06-01 PROCEDURE — 99282 EMERGENCY DEPT VISIT SF MDM: CPT | Performed by: PSYCHIATRY & NEUROLOGY

## 2022-06-01 RX ORDER — HYDROXYZINE HYDROCHLORIDE 25 MG/1
25 TABLET, FILM COATED ORAL ONCE
Status: DISCONTINUED | OUTPATIENT
Start: 2022-06-01 | End: 2022-06-02 | Stop reason: HOSPADM

## 2022-06-01 RX ORDER — OLANZAPINE 5 MG/1
5 TABLET, ORALLY DISINTEGRATING ORAL ONCE
Status: DISCONTINUED | OUTPATIENT
Start: 2022-06-01 | End: 2022-06-02 | Stop reason: HOSPADM

## 2022-06-01 ASSESSMENT — ENCOUNTER SYMPTOMS
DECREASED CONCENTRATION: 1
MUSCULOSKELETAL NEGATIVE: 1
NERVOUS/ANXIOUS: 1
HALLUCINATIONS: 0
GASTROINTESTINAL NEGATIVE: 1
CARDIOVASCULAR NEGATIVE: 1
CONSTITUTIONAL NEGATIVE: 1
NEUROLOGICAL NEGATIVE: 1
RESPIRATORY NEGATIVE: 1
EYES NEGATIVE: 1
HYPERACTIVE: 0

## 2022-06-02 ENCOUNTER — HOSPITAL ENCOUNTER (EMERGENCY)
Facility: CLINIC | Age: 22
Discharge: HOME | End: 2022-06-02
Attending: FAMILY MEDICINE | Admitting: FAMILY MEDICINE
Payer: MEDICARE

## 2022-06-02 ENCOUNTER — NURSE TRIAGE (OUTPATIENT)
Dept: NURSING | Facility: CLINIC | Age: 22
End: 2022-06-02
Payer: MEDICARE

## 2022-06-02 VITALS
RESPIRATION RATE: 16 BRPM | SYSTOLIC BLOOD PRESSURE: 117 MMHG | HEART RATE: 88 BPM | OXYGEN SATURATION: 100 % | DIASTOLIC BLOOD PRESSURE: 76 MMHG | TEMPERATURE: 98.3 F

## 2022-06-02 DIAGNOSIS — F79 INTELLECTUAL DISABILITY: Chronic | ICD-10-CM

## 2022-06-02 DIAGNOSIS — F60.3 BORDERLINE PERSONALITY DISORDER (H): Chronic | ICD-10-CM

## 2022-06-02 LAB
ATRIAL RATE - MUSE: 96 BPM
DIASTOLIC BLOOD PRESSURE - MUSE: NORMAL MMHG
INTERPRETATION ECG - MUSE: NORMAL
P AXIS - MUSE: 34 DEGREES
PR INTERVAL - MUSE: 182 MS
QRS DURATION - MUSE: 84 MS
QT - MUSE: 340 MS
QTC - MUSE: 429 MS
R AXIS - MUSE: 22 DEGREES
SYSTOLIC BLOOD PRESSURE - MUSE: NORMAL MMHG
T AXIS - MUSE: -6 DEGREES
VENTRICULAR RATE- MUSE: 96 BPM

## 2022-06-02 PROCEDURE — 99285 EMERGENCY DEPT VISIT HI MDM: CPT | Performed by: FAMILY MEDICINE

## 2022-06-02 PROCEDURE — 99283 EMERGENCY DEPT VISIT LOW MDM: CPT | Performed by: FAMILY MEDICINE

## 2022-06-02 RX ORDER — OLANZAPINE 10 MG/1
10 TABLET, ORALLY DISINTEGRATING ORAL ONCE
Status: DISCONTINUED | OUTPATIENT
Start: 2022-06-02 | End: 2022-06-02

## 2022-06-02 RX ORDER — HYDROXYZINE HYDROCHLORIDE 50 MG/1
50 TABLET, FILM COATED ORAL ONCE
Status: DISCONTINUED | OUTPATIENT
Start: 2022-06-02 | End: 2022-06-02

## 2022-06-02 NOTE — ED TRIAGE NOTES
Pt. was with roommate who got blood drawn and then felt short of breath.  Pt. then states this triggered her anxiety and she developed chest pain.     Triage Assessment     Row Name 06/01/22 1911       Triage Assessment (Adult)    Airway WDL WDL       Respiratory WDL    Respiratory WDL WDL       Skin Circulation/Temperature WDL    Skin Circulation/Temperature WDL WDL       Cardiac WDL    Cardiac WDL X;chest pain       Chest Pain Assessment    Chest Pain Location midsternal    Associated Signs/Symptoms anxiety       Peripheral/Neurovascular WDL    Peripheral Neurovascular WDL WDL       Cognitive/Neuro/Behavioral WDL    Cognitive/Neuro/Behavioral WDL WDL

## 2022-06-02 NOTE — ED NOTES
Bed: HW09UR  Expected date: 6/1/22  Expected time: 6:49 PM  Means of arrival:   Comments:  N 734: 20 y/o, F, anxiety

## 2022-06-02 NOTE — ED PROVIDER NOTES
ED Provider Note  [facility]      History     Chief Complaint   Patient presents with     Suicidal     Anxiety     Chest Pain     HPI  Sadaf Ross is a 21 year old female who is here as she grew anxious upon seeing her roommate getting a blood draw. Patient was unable to self-regulate and reverted to calling 911, fearful that she has uncontrollable anxiety and chest pain. Patient was initially anxious on arrival, but had calmed down as she was waiting in the ED to be evaluated. She is quite familiar with our ED due to her multiple visits. She witnessed another patient acting out and getting a behavioral code and felt that she was ready to return to her group home. Patient no longer feels suicidal. She does not exhibit psychosis.    PERSONAL MEDICAL HISTORY  Past Medical History:   Diagnosis Date     ADHD (attention deficit hyperactivity disorder)      Bipolar 1 disorder (H)      Borderline personality disorder (H)      Depression      Depressive disorder      Intellectual disability      Obesity      Syncope      PAST SURGICAL HISTORY  Past Surgical History:   Procedure Laterality Date     APPENDECTOMY       APPENDECTOMY       FAMILY HISTORY  Family History   Problem Relation Age of Onset     Diabetes Type 1 Father      Cancer Paternal Grandfather      SOCIAL HISTORY  Social History     Tobacco Use     Smoking status: Current Every Day Smoker     Packs/day: 0.50     Years: 5.00     Pack years: 2.50     Types: Cigarettes     Smokeless tobacco: Never Used   Substance Use Topics     Alcohol use: No     MEDICATIONS  Current Facility-Administered Medications   Medication     hydrOXYzine (ATARAX) tablet 25 mg     OLANZapine zydis (zyPREXA) ODT tab 5 mg     Current Outpatient Medications   Medication     ARIPiprazole ER (ABILIFY MAINTENA) 400 MG extended release inj syringe     benztropine (COGENTIN) 0.5 MG tablet     calcium carbonate (TUMS) 500 MG chewable tablet     chlorhexidine  (PERIDEX) 0.12 % solution     docusate sodium (COLACE) 100 MG capsule     hydrOXYzine (ATARAX) 50 MG tablet     metoclopramide (REGLAN) 10 MG tablet     norgestimate-ethinyl estradiol (ORTHO-CYCLEN) 0.25-35 MG-MCG tablet     OLANZapine (ZYPREXA) 2.5 MG tablet     OLANZapine zydis (ZYPREXA) 5 MG ODT     omeprazole (PRILOSEC) 40 MG DR capsule     ondansetron (ZOFRAN) 4 MG tablet     pantoprazole (PROTONIX) 40 MG EC tablet     polyethylene glycol (MIRALAX) 17 g packet     prochlorperazine (COMPAZINE) 10 MG tablet     promethazine (PHENERGAN) 25 MG suppository     venlafaxine (EFFEXOR-XR) 150 MG 24 hr capsule     Vitamin D, Cholecalciferol, 25 MCG (1000 UT) TABS     ALLERGIES  Allergies   Allergen Reactions     Penicillins Rash and Unknown          Review of Systems   Constitutional: Negative.    HENT: Negative.    Eyes: Negative.    Respiratory: Negative.    Cardiovascular: Negative.    Gastrointestinal: Negative.    Genitourinary: Negative.    Musculoskeletal: Negative.    Skin: Negative.    Neurological: Negative.    Psychiatric/Behavioral: Positive for behavioral problems, decreased concentration and suicidal ideas. Negative for hallucinations. The patient is nervous/anxious. The patient is not hyperactive.    All other systems reviewed and are negative.        Physical Exam   Weight: 109.3 kg (241 lb)  Physical Exam  Vitals and nursing note reviewed.   HENT:      Head: Normocephalic.   Pulmonary:      Effort: Pulmonary effort is normal.   Musculoskeletal:         General: Normal range of motion.   Neurological:      General: No focal deficit present.      Mental Status: She is alert.   Psychiatric:         Attention and Perception: Attention and perception normal. She does not perceive auditory or visual hallucinations.         Mood and Affect: Mood and affect normal.         Speech: Speech normal.         Behavior: Behavior normal. Behavior is not agitated, aggressive, hyperactive or combative. Behavior is  cooperative.         Thought Content: Thought content normal. Thought content is not paranoid or delusional. Thought content does not include homicidal or suicidal ideation.         Cognition and Memory: Cognition and memory normal.         Judgment: Judgment normal.         ED Course      Procedures         Mental Health Risk Assessment      PSS-3    Date and Time Over the past 2 weeks have you felt down, depressed, or hopeless? Over the past 2 weeks have you had thoughts of killing yourself? Have you ever attempted to kill yourself? When did this last happen? User   06/01/22 1920 yes yes yes between 1 and 6 months ago [facility]   06/01/22 1913 yes yes yes -- [facility]      C-SSRS ([place])    Date and Time Q1 Wished to be Dead (Past Month) Q2 Suicidal Thoughts (Past Month) Q3 Suicidal Thought Method Q4 Suicidal Intent without Specific Plan Q5 Suicide Intent with Specific Plan Q6 Suicide Behavior (Lifetime) Within the Past 3 Months? RETIRED: Level of Risk per Screen Screening Not Complete User   06/01/22 1920 yes yes yes no yes yes -- -- -- [facility]   06/01/22 1913 yes yes yes no yes yes -- -- -- [facility]   06/01/22 1908 yes yes yes yes no yes -- -- -- [facility]                Item Assessment   Suicidal Ideation None   Plan None   Intent None   Suicidal or self-harm behaviors None active   Risk Factors Agitation or severe anxiety   Protective Factors Supportive social network of family and/or friends        Results for orders placed or performed during the hospital encounter of 06/01/22   EKG 12 lead     Status: None (Preliminary result)   Result Value Ref Range    Systolic Blood Pressure  mmHg    Diastolic Blood Pressure  mmHg    Ventricular Rate 96 BPM    Atrial Rate 96 BPM    SC Interval 182 ms    QRS Duration 84 ms     ms    QTc 429 ms    P Axis 34 degrees    R AXIS 22 degrees    T Axis -6 degrees    Interpretation ECG       Sinus rhythm  Anterior infarct , age undetermined  Abnormal ECG       Medications   OLANZapine zydis  (zyPREXA) ODT tab 5 mg (has no administration in time range)   hydrOXYzine (ATARAX) tablet 25 mg (has no administration in time range)        Assessments & Plan (with Medical Decision Making)   Patient is here for for a mental health assessment as she got anxious and triggered by seeing a roommate get a blood draw. Patient has poor distress tolerance and reacts impulsively by wanting to come to the ED. She has done this repeatedly. She has returned to baseline and now would like to return to her group home as she is now getting triggered here due witnessing another patient's behavioral code. Patient appears at baseline. She is not at acute safety risk. She can be discharged back to her group home.    I have reviewed the nursing notes. I have reviewed the findings, diagnosis, plan and need for follow up with the patient.    New Prescriptions    No medications on file       Final diagnoses:   Intellectual disability   Acute reaction to stress       --  Magno Sena MD  [facility] EMERGENCY DEPARTMENT  6/1/2022     Magno Sena MD  06/01/22 0701

## 2022-06-03 ENCOUNTER — NURSE TRIAGE (OUTPATIENT)
Dept: NURSING | Facility: CLINIC | Age: 22
End: 2022-06-03
Payer: MEDICARE

## 2022-06-03 NOTE — ED PROVIDER NOTES
ED Provider Note  [facility]      History     Chief Complaint   Patient presents with     Suicidal     Having thoughts of hitting head on wall and biting self     HPI  Sadaf Ross is a 21 year old female who is brought to the emergency room from her group home she is well-known to us with multiple ER visits.  Patient notes that she felt overwhelmed at home she was having thoughts about her father who is passed away and felt as though she might want to hurt herself patient did take her evening meds just prior to transport and on arrival here patient states that her medications seem to be helping and that she is no longer feeling like she wants to harm her self or kill herself.  Patient denies any other acute changes at this time.    Past Medical History  Past Medical History:   Diagnosis Date     ADHD (attention deficit hyperactivity disorder)      Bipolar 1 disorder (H)      Borderline personality disorder (H)      Depression      Depressive disorder      Intellectual disability      Obesity      Syncope      Past Surgical History:   Procedure Laterality Date     APPENDECTOMY       APPENDECTOMY       ARIPiprazole ER (ABILIFY MAINTENA) 400 MG extended release inj syringe  benztropine (COGENTIN) 0.5 MG tablet  calcium carbonate (TUMS) 500 MG chewable tablet  chlorhexidine (PERIDEX) 0.12 % solution  docusate sodium (COLACE) 100 MG capsule  hydrOXYzine (ATARAX) 50 MG tablet  metoclopramide (REGLAN) 10 MG tablet  norgestimate-ethinyl estradiol (ORTHO-CYCLEN) 0.25-35 MG-MCG tablet  OLANZapine (ZYPREXA) 2.5 MG tablet  OLANZapine zydis (ZYPREXA) 5 MG ODT  omeprazole (PRILOSEC) 40 MG DR capsule  ondansetron (ZOFRAN) 4 MG tablet  pantoprazole (PROTONIX) 40 MG EC tablet  polyethylene glycol (MIRALAX) 17 g packet  prochlorperazine (COMPAZINE) 10 MG tablet  promethazine (PHENERGAN) 25 MG suppository  venlafaxine (EFFEXOR-XR) 150 MG 24 hr capsule  Vitamin D, Cholecalciferol, 25 MCG (1000 UT)  TABS      Allergies   Allergen Reactions     Penicillins Rash and Unknown     Family History  Family History   Problem Relation Age of Onset     Diabetes Type 1 Father      Cancer Paternal Grandfather      Social History   Social History     Tobacco Use     Smoking status: Current Every Day Smoker     Packs/day: 0.50     Years: 5.00     Pack years: 2.50     Types: Cigarettes     Smokeless tobacco: Never Used   Substance Use Topics     Alcohol use: No     Drug use: No      Past medical history, past surgical history, medications, allergies, family history, and social history were reviewed with the patient. No additional pertinent items.       Review of Systems  A complete review of systems was performed with pertinent positives and negatives noted in the HPI, and all other systems negative.    Physical Exam   BP: 121/83  Pulse: 99  Temp: 98.3  F (36.8  C)  Resp: 16  SpO2: 100 %  Physical Exam  Constitutional:       General: She is not in acute distress.     Appearance: She is not diaphoretic.   HENT:      Head: Atraumatic.      Mouth/Throat:      Pharynx: No oropharyngeal exudate.   Eyes:      General: No scleral icterus.     Pupils: Pupils are equal, round, and reactive to light.   Cardiovascular:      Heart sounds: Normal heart sounds.   Pulmonary:      Effort: No respiratory distress.      Breath sounds: Normal breath sounds.   Abdominal:      General: Bowel sounds are normal.      Palpations: Abdomen is soft.      Tenderness: There is no abdominal tenderness.   Musculoskeletal:         General: No tenderness.   Skin:     General: Skin is warm.      Findings: No rash.   Neurological:      General: No focal deficit present.      Motor: No weakness.      Coordination: Coordination normal.   Psychiatric:         Mood and Affect: Affect is tearful.         Speech: Speech normal.         Behavior: Behavior is cooperative.         Thought Content: Thought content does not include homicidal or suicidal ideation.          ED Course      Procedures    The medical record was reviewed and interpreted.  Medications - No data to display     Assessments & Plan (with Medical Decision Making)     I have reviewed the nursing notes. I have reviewed the findings, diagnosis, plan and need for follow up with the patient.      Patient with intellectual disability and borderline personality disorder at this time had thoughts of harming herself but is now resolved is feeling as though she would be safe going back to her group home patient will be transported by ambulance back to the group home.    Final diagnoses:   Intellectual disability   Borderline personality disorder (H)       --    [facility] EMERGENCY DEPARTMENT  6/2/2022     Robert Olea MD  06/02/22 9659

## 2022-06-03 NOTE — TELEPHONE ENCOUNTER
Pt is having chest pain, suicidal ideation. Feels like burning up, sweating, heart racing, scared and shaking.     On-going over the years but since February, father's passing, she has been rito feeling like hurting herself by punching and biting. Currently lives in a group home and has been to hospital when ever she feels like this.  Pt continue to say her Base line is suicidal ideation.  Disposition to call 911. Stayed on line with pt until police arrived.    Paula Barnes, RN, BSN on 6/2/2022 at 9:11 PM  [facility]        Reason for Disposition    Sounds like a life-threatening emergency to the triager    Additional Information    Negative: Patient attempted suicide    Negative: Patient is threatening suicide now    Negative: Violent behavior, or threatening to physically hurt or kill someone    Negative: [1] Patient is very confused (disoriented, slurred speech) AND [2] no other adult (e.g., friend or family member) available    Negative: [1] Difficult to awaken or acting very confused (disoriented, slurred speech) AND [2] new onset    Protocols used: SUICIDE [id number]

## 2022-06-03 NOTE — DISCHARGE INSTRUCTIONS
Discharge back to the group home continue with your current outpatient cares and follow-up with your outpatient provider.

## 2022-06-03 NOTE — ED NOTES
Bed: HW02  Expected date:   Expected time:   Means of arrival:   Comments:  [address]  21 F  EFRAIN Brice

## 2022-06-04 ENCOUNTER — APPOINTMENT (OUTPATIENT)
Dept: GENERAL RADIOLOGY | Facility: CLINIC | Age: 22
End: 2022-06-04
Attending: EMERGENCY MEDICINE
Payer: MEDICARE

## 2022-06-04 ENCOUNTER — HOSPITAL ENCOUNTER (EMERGENCY)
Facility: CLINIC | Age: 22
Discharge: NURSING, REHAB OR LONG-TERM CARE | End: 2022-06-05
Attending: EMERGENCY MEDICINE | Admitting: EMERGENCY MEDICINE
Payer: MEDICARE

## 2022-06-04 ENCOUNTER — NURSE TRIAGE (OUTPATIENT)
Dept: NURSING | Facility: CLINIC | Age: 22
End: 2022-06-04
Payer: MEDICARE

## 2022-06-04 DIAGNOSIS — R31.29 OTHER MICROSCOPIC HEMATURIA: ICD-10-CM

## 2022-06-04 DIAGNOSIS — R45.851 VERBALIZES SUICIDAL THOUGHTS: ICD-10-CM

## 2022-06-04 LAB
ALBUMIN SERPL-MCNC: 3.9 G/DL (ref 3.4–5)
ALBUMIN UR-MCNC: NEGATIVE MG/DL
ALP SERPL-CCNC: 59 U/L (ref 40–150)
ALT SERPL W P-5'-P-CCNC: 24 U/L (ref 0–50)
ANION GAP SERPL CALCULATED.3IONS-SCNC: 9 MMOL/L (ref 3–14)
APPEARANCE UR: ABNORMAL
AST SERPL W P-5'-P-CCNC: 13 U/L (ref 0–45)
BASOPHILS # BLD AUTO: 0 10E3/UL (ref 0–0.2)
BASOPHILS NFR BLD AUTO: 0 %
BILIRUB SERPL-MCNC: 0.3 MG/DL (ref 0.2–1.3)
BILIRUB UR QL STRIP: NEGATIVE
BUN SERPL-MCNC: 15 MG/DL (ref 7–30)
CALCIUM SERPL-MCNC: 9.4 MG/DL (ref 8.5–10.1)
CHLORIDE BLD-SCNC: 108 MMOL/L (ref 94–109)
CO2 SERPL-SCNC: 25 MMOL/L (ref 20–32)
COLOR UR AUTO: ABNORMAL
CREAT SERPL-MCNC: 0.9 MG/DL (ref 0.52–1.04)
D DIMER PPP FEU-MCNC: <0.27 UG/ML FEU (ref 0–0.5)
EOSINOPHIL # BLD AUTO: 0.2 10E3/UL (ref 0–0.7)
EOSINOPHIL NFR BLD AUTO: 3 %
ERYTHROCYTE [DISTWIDTH] IN BLOOD BY AUTOMATED COUNT: 13 % (ref 10–15)
GFR SERPL CREATININE-BSD FRML MDRD: >90 ML/MIN/1.73M2
GLUCOSE BLD-MCNC: 83 MG/DL (ref 70–99)
GLUCOSE UR STRIP-MCNC: NEGATIVE MG/DL
HCG SERPL QL: NEGATIVE
HCT VFR BLD AUTO: 36.2 % (ref 35–47)
HGB BLD-MCNC: 12.4 G/DL (ref 11.7–15.7)
HGB UR QL STRIP: ABNORMAL
IMM GRANULOCYTES # BLD: 0 10E3/UL
IMM GRANULOCYTES NFR BLD: 0 %
KETONES UR STRIP-MCNC: NEGATIVE MG/DL
LEUKOCYTE ESTERASE UR QL STRIP: ABNORMAL
LIPASE SERPL-CCNC: 166 U/L (ref 73–393)
LYMPHOCYTES # BLD AUTO: 2.1 10E3/UL (ref 0.8–5.3)
LYMPHOCYTES NFR BLD AUTO: 27 %
MCH RBC QN AUTO: 29.5 PG (ref 26.5–33)
MCHC RBC AUTO-ENTMCNC: 34.3 G/DL (ref 31.5–36.5)
MCV RBC AUTO: 86 FL (ref 78–100)
MONOCYTES # BLD AUTO: 0.6 10E3/UL (ref 0–1.3)
MONOCYTES NFR BLD AUTO: 8 %
MUCOUS THREADS #/AREA URNS LPF: PRESENT /LPF
NEUTROPHILS # BLD AUTO: 4.7 10E3/UL (ref 1.6–8.3)
NEUTROPHILS NFR BLD AUTO: 62 %
NITRATE UR QL: NEGATIVE
NRBC # BLD AUTO: 0 10E3/UL
NRBC BLD AUTO-RTO: 0 /100
PH UR STRIP: 6 [PH] (ref 5–7)
PLATELET # BLD AUTO: 244 10E3/UL (ref 150–450)
POTASSIUM BLD-SCNC: 3.6 MMOL/L (ref 3.4–5.3)
PROT SERPL-MCNC: 7.9 G/DL (ref 6.8–8.8)
RBC # BLD AUTO: 4.21 10E6/UL (ref 3.8–5.2)
RBC URINE: 4 /HPF
SODIUM SERPL-SCNC: 142 MMOL/L (ref 133–144)
SP GR UR STRIP: 1.02 (ref 1–1.03)
SQUAMOUS EPITHELIAL: 10 /HPF
TRANSITIONAL EPI: <1 /HPF
TROPONIN I SERPL HS-MCNC: 6 NG/L
UROBILINOGEN UR STRIP-MCNC: NORMAL MG/DL
WBC # BLD AUTO: 7.6 10E3/UL (ref 4–11)
WBC URINE: 7 /HPF

## 2022-06-04 PROCEDURE — 83690 ASSAY OF LIPASE: CPT | Performed by: EMERGENCY MEDICINE

## 2022-06-04 PROCEDURE — 84484 ASSAY OF TROPONIN QUANT: CPT | Performed by: EMERGENCY MEDICINE

## 2022-06-04 PROCEDURE — 93005 ELECTROCARDIOGRAM TRACING: CPT | Performed by: EMERGENCY MEDICINE

## 2022-06-04 PROCEDURE — 80053 COMPREHEN METABOLIC PANEL: CPT | Performed by: EMERGENCY MEDICINE

## 2022-06-04 PROCEDURE — 82040 ASSAY OF SERUM ALBUMIN: CPT | Performed by: EMERGENCY MEDICINE

## 2022-06-04 PROCEDURE — 71046 X-RAY EXAM CHEST 2 VIEWS: CPT

## 2022-06-04 PROCEDURE — 36415 COLL VENOUS BLD VENIPUNCTURE: CPT | Performed by: EMERGENCY MEDICINE

## 2022-06-04 PROCEDURE — 93010 ELECTROCARDIOGRAM REPORT: CPT | Performed by: EMERGENCY MEDICINE

## 2022-06-04 PROCEDURE — 85379 FIBRIN DEGRADATION QUANT: CPT | Performed by: EMERGENCY MEDICINE

## 2022-06-04 PROCEDURE — 99285 EMERGENCY DEPT VISIT HI MDM: CPT | Mod: 25 | Performed by: EMERGENCY MEDICINE

## 2022-06-04 PROCEDURE — 87086 URINE CULTURE/COLONY COUNT: CPT | Performed by: EMERGENCY MEDICINE

## 2022-06-04 PROCEDURE — 81003 URINALYSIS AUTO W/O SCOPE: CPT | Performed by: EMERGENCY MEDICINE

## 2022-06-04 PROCEDURE — 84703 CHORIONIC GONADOTROPIN ASSAY: CPT | Performed by: EMERGENCY MEDICINE

## 2022-06-04 PROCEDURE — 85025 COMPLETE CBC W/AUTO DIFF WBC: CPT | Performed by: EMERGENCY MEDICINE

## 2022-06-04 ASSESSMENT — ENCOUNTER SYMPTOMS
VOMITING: 1
ABDOMINAL PAIN: 1
BACK PAIN: 1
COUGH: 1
NAUSEA: 1

## 2022-06-04 NOTE — TELEPHONE ENCOUNTER
Sadaf calls and says that she feels that she is over heating and feels that she could pass out. Pt. Says that she is laying down now. Pt. Will call 911 now. COVID 19 Nurse Triage Plan/Patient Instructions    Please be aware that novel coronavirus (COVID-19) may be circulating in the community. If you develop symptoms such as fever, cough, or SOB or if you have concerns about the presence of another infection including coronavirus (COVID-19), please contact your health care provider or visit [email or web address].     Disposition/Instructions    Call to EMS/911 recommended. Follow protocol based instructions.     Bring Your Own Device:  Please also bring your smart device(s) (smart phones, tablets, laptops) and their charging cables for your personal use and to communicate with your care team during your visit.    Thank you for taking steps to prevent the spread of this virus.  o Limit your contact with others.  o Wear a simple mask to cover your cough.  o Wash your hands well and often.    Resources    [facility]: About COVID-19: [email or web address]    CDC: What to Do If You're Sick: www.cdc.gov/coronavirus/2019-ncov/about/steps-when-sick.html    CDC: Ending Home Isolation: www.cdc.gov/coronavirus/2019-ncov/hcp/disposition-in-home-patients.html     CDC: Caring for Someone: www.cdc.gov/coronavirus/2019-ncov/if-you-are-sick/care-for-someone.html     [facility]: Interim Guidance for Hospital Discharge to Home: www.health.[facility].[place]/diseases/coronavirus/hcp/hospdischarge.pdf    [facility] clinical trials (COVID-19 research studies): [email or web address]     Below are the COVID-19 hotlines at the [place] Department of Health ([facility]). Interpreters are available.   o For health questions: Call 765-985-1264 or 1-524.628.7330 (7 a.m. to 7 p.m.)  o For questions about schools and childcare: Call 134-262-9464 or 1-207.134.8101 (7 a.m. to 7 p.m.)                   Reason  for Disposition    Shock suspected (e.g., cold/pale/clammy skin, too weak to stand, low BP, rapid pulse)    Additional Information    Negative: Severe difficulty breathing (e.g., struggling for each breath, speaks in single words)    Negative: [1] Difficulty breathing or swallowing AND [2] started suddenly after medicine, an allergic food or bee sting    Protocols used: DIZZINESS - [id number]

## 2022-06-05 ENCOUNTER — NURSE TRIAGE (OUTPATIENT)
Dept: NURSING | Facility: CLINIC | Age: 22
End: 2022-06-05
Payer: MEDICARE

## 2022-06-05 VITALS
HEART RATE: 93 BPM | TEMPERATURE: 98.8 F | RESPIRATION RATE: 16 BRPM | DIASTOLIC BLOOD PRESSURE: 61 MMHG | OXYGEN SATURATION: 98 % | SYSTOLIC BLOOD PRESSURE: 124 MMHG

## 2022-06-05 PROCEDURE — 90791 PSYCH DIAGNOSTIC EVALUATION: CPT

## 2022-06-05 NOTE — ED TRIAGE NOTES
"\"I was having a panic attack and wanted someone to talk to.\" Pt is 20 yo F bib EMS activated by self. Pt reports baseline CAH to kill self, however contracts for safety and denies SI or intention to act on it at this time. Pt appears in stable condition, was oriented to ED evaluation process.       "

## 2022-06-05 NOTE — ED NOTES
Met with patient at 0230 am in room 16A of the adult ED. Patient was asleep but woke up easily yet was still very groggy. Having had numerous prior encounters with the patient and understanding her baseline behaviors, I asked patient if the overwhelming thoughts she was having earlier that brought her in to the ED were still present and she stated that they were not. When asked if she was feeling suicidal or in any way self injurious, or wanting to harm herself, patient rejected this stating that she felt she wanted to return home. When pressed further on any anxiety or suicidal or self injurious thoughts or concerns, patient again said she is good to go home. Consulted with her current attending ZONIA Browning DO, who concurred. Further, it was decided that the patient could sleep until morning at which time she would have a ride arranged back to her group home. Patient's RN was also notified of the plan. Patient did not require an assessment for her presenting issues as she uses the ED as a baseline coping skill, and acknowledged her readiness to return home as she is currently not suicidal, homicidal, or self injurious, and denied any hallucinations, auditory or visual, at the current time.     Patient's group home was called at [phone] and spoke to Sophie who gave the group home address as [address] [place] [id number] for the patient's return in the morning. They will be expecting her with a call from the ED when she departs.

## 2022-06-05 NOTE — TELEPHONE ENCOUNTER
Was in the ED on 6/1, 6/2, and 6/4.   Lives at the [facility] group home in [place] [phone].  Calling from her locked bedroom.     Patient with history of bipolar 1 disorder, borderline personality dissorter, intellectual disability, chronic suicidal thoughts with command hallucinations.    Voices are telling patient to hurt staff at group home by cutting them with a knife and choking them.  Has been yelling at staff.   She tells writer staff has taken away the knifes.   Writer did talk with one staff member from the group home.  They are uncomfortable with her being there.    She also wants the hurt her self by hitting head on wall and cutting self.  Her father passed away last February.  She has been thinking about him with father's day coming up.  She has called 911 twice today and they came to the home to assess her.  Ambulance staff felt she did not need to go into the ED          Writer spoke to the [place] 911 dispatch .  Relayed to the  Sadaf's plan to hurt staff and self.  .   said this is the third call about Sadaf today.,   would pass on writers concern to police/ambulance.   not sure if they would go out to the house again.                     .  Autumn Alcaraz RN, MA  [facility] Triage Nurse Advisor    Reason for Disposition    Violent behavior, or threatening to physically hurt or kill someone    Protocols used: SUICIDE [id number]

## 2022-06-05 NOTE — DISCHARGE INSTRUCTIONS
Please make an appointment to follow up with Your Primary Care Provider as soon as possible to follow up on persistent hematuria (blood in urine).  If you have worsening symptoms including increased pain, fever, pain with urinating or other concerns, return to the emergency department.       Please make an appointment to follow up with your therapist and/or Psychiatric Clinic (phone: [phone]) within 1-3 days.     Return to the ED if you are having worsening thoughts/feelings of harming yourself or others, or any urgent/life-threatening concerns.     DISCHARGE RESOURCES:  -[facility] [phone]   -[facility] [phone] or [phone].  -Crisis Intervention: [phone] or [phone] (TTY: [phone]).  Call anytime.  -Suicide Awareness Voices of Education (SAVE) (www.save.org): 274-691-TBBW (1371)  -National Suicide Prevention Line (www.mentalhealthmn.org): 845-659-CEQA (5622)  -National [place] on Mental Illness (www.mn.celine.org): 103.877.1467 or 564-887-9467.  -Bfto7xmed: text the word LIFE to 32618 for immediate support and crisis intervention  -Mental Health Consumer/Survivor Network of [place] (www.mhcsn.net): 990.380.6963 or 138-991-5244  -Mental Health Association of [place] (www.mentalhealth.org): 687.431.9702 or 062-038-4147

## 2022-06-05 NOTE — ED NOTES
Group home called and is ok for pt to return there. Worker there this am is Chica. Also spoke with pt's room mate Candelaria ([phone]) who answered the group home's phone. Pt's Group Home number is [phone] in [place].

## 2022-06-05 NOTE — ED PROVIDER NOTES
Emergency Department Patient Sign-out       Brief HPI:  This is a 21 year old female signed out to me by Dr. Lopez.  See initial ED Provider note for full details of the presentation.  Patient with history of bipolar 1 disorder, borderline personality sorter, intellectual disability, presents from group home with acute on chronic suicidal thoughts with command hallucinations.  Also had a variety of medical complaints which were worked up without evidence of acute abnormality.  Medically cleared.  Awaiting DEC assessment.      Exam:   Patient Vitals for the past 24 hrs:   BP Temp Temp src Pulse Resp SpO2   06/04/22 2322 117/76 98.8  F (37.1  C) Oral 65 16 99 %   06/04/22 1919 129/83 98.7  F (37.1  C) Oral 94 18 98 %           ED RESULTS:   Results for orders placed or performed during the hospital encounter of 06/04/22 (from the past 24 hour(s))   CBC with platelets differential     Status: None    Collection Time: 06/04/22  8:02 PM    Narrative    The following orders were created for panel order CBC with platelets differential.  Procedure                               Abnormality         Status                     ---------                               -----------         ------                     CBC with platelets and d...[[id number]]                      Final result                 Please view results for these tests on the individual orders.   Comprehensive metabolic panel     Status: Normal    Collection Time: 06/04/22  8:02 PM   Result Value Ref Range    Sodium 142 133 - 144 mmol/L    Potassium 3.6 3.4 - 5.3 mmol/L    Chloride 108 94 - 109 mmol/L    Carbon Dioxide (CO2) 25 20 - 32 mmol/L    Anion Gap 9 3 - 14 mmol/L    Urea Nitrogen 15 7 - 30 mg/dL    Creatinine 0.90 0.52 - 1.04 mg/dL    Calcium 9.4 8.5 - 10.1 mg/dL    Glucose 83 70 - 99 mg/dL    Alkaline Phosphatase 59 40 - 150 U/L    AST 13 0 - 45 U/L    ALT 24 0 - 50 U/L    Protein Total 7.9 6.8 - 8.8 g/dL    Albumin 3.9 3.4 - 5.0 g/dL    Bilirubin  Total 0.3 0.2 - 1.3 mg/dL    GFR Estimate >90 >60 mL/min/1.73m2   Lipase     Status: Normal    Collection Time: 06/04/22  8:02 PM   Result Value Ref Range    Lipase 166 73 - 393 U/L   Troponin I     Status: Normal    Collection Time: 06/04/22  8:02 PM   Result Value Ref Range    Troponin I High Sensitivity 6 <54 ng/L   D dimer quantitative     Status: Normal    Collection Time: 06/04/22  8:02 PM   Result Value Ref Range    D-Dimer Quantitative <0.27 0.00 - 0.50 ug/mL FEU    Narrative    This D-dimer assay is intended for use in conjunction with a clinical pretest probability assessment model to exclude pulmonary embolism (PE) and deep venous thrombosis (DVT) in outpatients suspected of PE or DVT. The cut-off value is 0.50 ug/mL FEU.   HCG qualitative Blood     Status: Normal    Collection Time: 06/04/22  8:02 PM   Result Value Ref Range    hCG Serum Qualitative Negative Negative   CBC with platelets and differential     Status: None    Collection Time: 06/04/22  8:02 PM   Result Value Ref Range    WBC Count 7.6 4.0 - 11.0 10e3/uL    RBC Count 4.21 3.80 - 5.20 10e6/uL    Hemoglobin 12.4 11.7 - 15.7 g/dL    Hematocrit 36.2 35.0 - 47.0 %    MCV 86 78 - 100 fL    MCH 29.5 26.5 - 33.0 pg    MCHC 34.3 31.5 - 36.5 g/dL    RDW 13.0 10.0 - 15.0 %    Platelet Count 244 150 - 450 10e3/uL    % Neutrophils 62 %    % Lymphocytes 27 %    % Monocytes 8 %    % Eosinophils 3 %    % Basophils 0 %    % Immature Granulocytes 0 %    NRBCs per 100 WBC 0 <1 /100    Absolute Neutrophils 4.7 1.6 - 8.3 10e3/uL    Absolute Lymphocytes 2.1 0.8 - 5.3 10e3/uL    Absolute Monocytes 0.6 0.0 - 1.3 10e3/uL    Absolute Eosinophils 0.2 0.0 - 0.7 10e3/uL    Absolute Basophils 0.0 0.0 - 0.2 10e3/uL    Absolute Immature Granulocytes 0.0 <=0.4 10e3/uL    Absolute NRBCs 0.0 10e3/uL   UA with Microscopic reflex to Culture     Status: Abnormal    Collection Time: 06/04/22  8:18 PM    Specimen: Urine, Midstream   Result Value Ref Range    Color Urine Light  Yellow Colorless, Straw, Light Yellow, Yellow    Appearance Urine Slightly Cloudy (A) Clear    Glucose Urine Negative Negative mg/dL    Bilirubin Urine Negative Negative    Ketones Urine Negative Negative mg/dL    Specific Gravity Urine 1.022 1.003 - 1.035    Blood Urine Trace (A) Negative    pH Urine 6.0 5.0 - 7.0    Protein Albumin Urine Negative Negative mg/dL    Urobilinogen Urine Normal Normal, 2.0 mg/dL    Nitrite Urine Negative Negative    Leukocyte Esterase Urine Large (A) Negative    Mucus Urine Present (A) None Seen /LPF    RBC Urine 4 (H) <=2 /HPF    WBC Urine 7 (H) <=5 /HPF    Squamous Epithelials Urine 10 (H) <=1 /HPF    Transitional Epithelials Urine <1 <=1 /HPF    Narrative    Urine Culture ordered based on laboratory criteria   XR Chest 2 Views     Status: None    Collection Time: 06/04/22  8:49 PM    Narrative    EXAM: XR CHEST 2 VW  LOCATION: [facility]  DATE/TIME: 6/4/2022 8:49 PM    INDICATION: Cough, shortness of breath.  COMPARISON: None.      Impression    IMPRESSION: Negative chest.       ED MEDICATIONS:   Medications - No data to display      Impression:    ICD-10-CM    1. Verbalizes suicidal thoughts  R45.851        Plan:    Pending studies include DEC assessment    Patient evaluated by the mental health team.  Patient is able to contract for safety.  She feels that her suicidal thoughts are consistent with her baseline.  She has no active plan.  She feels safe going back to the group home.  Will allow patient to rest in the ED overnight.  Plan for transfer back to group home in a.m.    DO Nam Schmitt Dustin Nickolas, DO  06/05/22 6402
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
## 2022-06-06 ENCOUNTER — HOSPITAL ENCOUNTER (EMERGENCY)
Facility: CLINIC | Age: 22
Discharge: NURSING, REHAB OR LONG-TERM CARE | End: 2022-06-07
Attending: EMERGENCY MEDICINE | Admitting: EMERGENCY MEDICINE
Payer: MEDICARE

## 2022-06-06 VITALS
TEMPERATURE: 98.1 F | OXYGEN SATURATION: 98 % | SYSTOLIC BLOOD PRESSURE: 122 MMHG | DIASTOLIC BLOOD PRESSURE: 84 MMHG | HEART RATE: 104 BPM | RESPIRATION RATE: 18 BRPM

## 2022-06-06 DIAGNOSIS — R45.851 SUICIDAL THOUGHTS: ICD-10-CM

## 2022-06-06 LAB
ATRIAL RATE - MUSE: 80 BPM
BACTERIA UR CULT: NORMAL
DIASTOLIC BLOOD PRESSURE - MUSE: NORMAL MMHG
INTERPRETATION ECG - MUSE: NORMAL
P AXIS - MUSE: 39 DEGREES
PR INTERVAL - MUSE: 172 MS
QRS DURATION - MUSE: 88 MS
QT - MUSE: 380 MS
QTC - MUSE: 438 MS
R AXIS - MUSE: 39 DEGREES
SYSTOLIC BLOOD PRESSURE - MUSE: NORMAL MMHG
T AXIS - MUSE: 3 DEGREES
VENTRICULAR RATE- MUSE: 80 BPM

## 2022-06-06 PROCEDURE — 250N000013 HC RX MED GY IP 250 OP 250 PS 637: Performed by: INTERNAL MEDICINE

## 2022-06-06 PROCEDURE — 99283 EMERGENCY DEPT VISIT LOW MDM: CPT | Mod: 25 | Performed by: EMERGENCY MEDICINE

## 2022-06-06 PROCEDURE — 99283 EMERGENCY DEPT VISIT LOW MDM: CPT | Performed by: EMERGENCY MEDICINE

## 2022-06-06 RX ADMIN — Medication 1 LOZENGE: at 22:36

## 2022-06-06 ASSESSMENT — ENCOUNTER SYMPTOMS: AGITATION: 0

## 2022-06-06 NOTE — RESULT ENCOUNTER NOTE
Final urine culture report is negative.  Adult Negative Urine culture parameters per protocol: Any # Urogenital single or mixed organism, <10,000 col/ml single organism (cath/midstream), and > 3 organisms (No susceptibilities performed).  [facility] Emergency Dept discharge antibiotic prescribed (If applicable): None  Treatment recommendations per [facility] ED Lab Result Urine Culture protocol.

## 2022-06-06 NOTE — TELEPHONE ENCOUNTER
Sadaf calls and says that she wants to hurt herself. Pt. Says that she does not care about life. Pt. Says that she has no way to harm herself. Pt. Says that she lives in the [facility] in [place]. Pt. Says that she feels tired and does not want to get arrested and go to [facility], for calling 911 too much. Pt. Says that the hospital will not do anything to help her. Pt. Says that she will not tell this nurse that she will not harm herself, with a verbal contract. Because of this, RN called 911 and was connected to the [place] 911 service. [place] , Jayesh, was told about pt's symptoms and  says that he will alert and send this message to the [place] [employer]. RN called Sadaf back and Sadaf says that no one has come to see her from the police. RN spoke to pt's group home staff and told them that 911 has been contacted and that the [place] [employer] is aware of pt's symptoms. Staff worker says that they are taking care of pt. COVID 19 Nurse Triage Plan/Patient Instructions    Please be aware that novel coronavirus (COVID-19) may be circulating in the community. If you develop symptoms such as fever, cough, or SOB or if you have concerns about the presence of another infection including coronavirus (COVID-19), please contact your health care provider or visit [email or web address].     Disposition/Instructions    Call to EMS/911 recommended. Follow protocol based instructions.     Bring Your Own Device:  Please also bring your smart device(s) (smart phones, tablets, laptops) and their charging cables for your personal use and to communicate with your care team during your visit.    Thank you for taking steps to prevent the spread of this virus.  o Limit your contact with others.  o Wear a simple mask to cover your cough.  o Wash your hands well and often.    Resources     [facility]: About COVID-19:  [email or web address]    CDC: What to Do If You're Sick: www.cdc.gov/coronavirus/2019-ncov/about/steps-when-sick.html    CDC: Ending Home Isolation: www.cdc.gov/coronavirus/2019-ncov/hcp/disposition-in-home-patients.html     CDC: Caring for Someone: www.cdc.gov/coronavirus/2019-ncov/if-you-are-sick/care-for-someone.html     [facility]: Interim Guidance for Hospital Discharge to Home: [email or web address]/coronavirus/hcp/hospdischarge.pdf    [facility] clinical trials (COVID-19 research studies): [email or web address]     Below are the COVID-19 hotlines at the [place] Department [facility] ([facility]). Interpreters are available.   o For health questions: Call 032-688-8786 or 1-645.358.1012 (7 a.m. to 7 p.m.)  o For questions about schools and childcare: Call 787-202-6947 or 1-511.668.2447 (7 a.m. to 7 p.m.)                   Reason for Disposition    Patient is threatening suicide now    Additional Information    Negative: Patient attempted suicide    Protocols used: SUICIDE [id number]

## 2022-06-07 ENCOUNTER — HOSPITAL ENCOUNTER (EMERGENCY)
Facility: CLINIC | Age: 22
Discharge: HOME | End: 2022-06-07
Attending: STUDENT IN AN ORGANIZED HEALTH CARE EDUCATION/TRAINING PROGRAM | Admitting: STUDENT IN AN ORGANIZED HEALTH CARE EDUCATION/TRAINING PROGRAM
Payer: MEDICARE

## 2022-06-07 ENCOUNTER — TELEPHONE (OUTPATIENT)
Dept: FAMILY MEDICINE | Facility: CLINIC | Age: 22
End: 2022-06-07
Payer: MEDICARE

## 2022-06-07 ENCOUNTER — APPOINTMENT (OUTPATIENT)
Dept: GENERAL RADIOLOGY | Facility: CLINIC | Age: 22
End: 2022-06-07
Attending: STUDENT IN AN ORGANIZED HEALTH CARE EDUCATION/TRAINING PROGRAM
Payer: MEDICARE

## 2022-06-07 ENCOUNTER — HOSPITAL ENCOUNTER (EMERGENCY)
Facility: CLINIC | Age: 22
Discharge: HOME | End: 2022-06-07
Attending: EMERGENCY MEDICINE
Payer: MEDICARE

## 2022-06-07 ENCOUNTER — LAB REQUISITION (OUTPATIENT)
Dept: LAB | Facility: CLINIC | Age: 22
End: 2022-06-07
Payer: MEDICARE

## 2022-06-07 ENCOUNTER — NURSE TRIAGE (OUTPATIENT)
Dept: NURSING | Facility: CLINIC | Age: 22
End: 2022-06-07

## 2022-06-07 VITALS
HEART RATE: 84 BPM | SYSTOLIC BLOOD PRESSURE: 122 MMHG | DIASTOLIC BLOOD PRESSURE: 73 MMHG | TEMPERATURE: 97.9 F | RESPIRATION RATE: 18 BRPM | OXYGEN SATURATION: 100 %

## 2022-06-07 VITALS
HEIGHT: 64 IN | DIASTOLIC BLOOD PRESSURE: 81 MMHG | BODY MASS INDEX: 40.12 KG/M2 | WEIGHT: 235 LBS | SYSTOLIC BLOOD PRESSURE: 118 MMHG | TEMPERATURE: 98.8 F | OXYGEN SATURATION: 98 % | HEART RATE: 86 BPM | RESPIRATION RATE: 16 BRPM

## 2022-06-07 DIAGNOSIS — J02.9 ACUTE PHARYNGITIS, UNSPECIFIED: ICD-10-CM

## 2022-06-07 DIAGNOSIS — J06.9 VIRAL URI: ICD-10-CM

## 2022-06-07 DIAGNOSIS — Z11.52 ENCOUNTER FOR SCREENING LABORATORY TESTING FOR SEVERE ACUTE RESPIRATORY SYNDROME CORONAVIRUS 2 (SARS-COV-2): ICD-10-CM

## 2022-06-07 DIAGNOSIS — R06.02 SHORTNESS OF BREATH: Primary | ICD-10-CM

## 2022-06-07 DIAGNOSIS — R45.851 VERBALIZES SUICIDAL THOUGHTS: ICD-10-CM

## 2022-06-07 DIAGNOSIS — R05.9 COUGH: ICD-10-CM

## 2022-06-07 LAB
BACTERIA UR CULT: NORMAL
FLUAV RNA SPEC QL NAA+PROBE: NEGATIVE
FLUBV RNA RESP QL NAA+PROBE: NEGATIVE
SARS-COV-2 RNA RESP QL NAA+PROBE: NEGATIVE

## 2022-06-07 PROCEDURE — 99283 EMERGENCY DEPT VISIT LOW MDM: CPT | Performed by: STUDENT IN AN ORGANIZED HEALTH CARE EDUCATION/TRAINING PROGRAM

## 2022-06-07 PROCEDURE — 87636 SARSCOV2 & INF A&B AMP PRB: CPT | Performed by: STUDENT IN AN ORGANIZED HEALTH CARE EDUCATION/TRAINING PROGRAM

## 2022-06-07 PROCEDURE — C9803 HOPD COVID-19 SPEC COLLECT: HCPCS | Performed by: STUDENT IN AN ORGANIZED HEALTH CARE EDUCATION/TRAINING PROGRAM

## 2022-06-07 PROCEDURE — 99283 EMERGENCY DEPT VISIT LOW MDM: CPT | Mod: 25 | Performed by: STUDENT IN AN ORGANIZED HEALTH CARE EDUCATION/TRAINING PROGRAM

## 2022-06-07 PROCEDURE — 99283 EMERGENCY DEPT VISIT LOW MDM: CPT | Mod: 27 | Performed by: EMERGENCY MEDICINE

## 2022-06-07 PROCEDURE — 87081 CULTURE SCREEN ONLY: CPT | Mod: ORL | Performed by: REGISTERED NURSE

## 2022-06-07 PROCEDURE — 71046 X-RAY EXAM CHEST 2 VIEWS: CPT

## 2022-06-07 PROCEDURE — 250N000013 HC RX MED GY IP 250 OP 250 PS 637: Performed by: EMERGENCY MEDICINE

## 2022-06-07 RX ORDER — BENZONATATE 100 MG/1
200 CAPSULE ORAL ONCE
Status: COMPLETED | OUTPATIENT
Start: 2022-06-07 | End: 2022-06-07

## 2022-06-07 RX ORDER — BENZONATATE 100 MG/1
100 CAPSULE ORAL 3 TIMES DAILY PRN
Qty: 21 CAPSULE | Refills: 0 | Status: SHIPPED | OUTPATIENT
Start: 2022-06-07 | End: 2022-07-28

## 2022-06-07 RX ADMIN — BENZONATATE 200 MG: 100 CAPSULE ORAL at 22:25

## 2022-06-07 ASSESSMENT — ENCOUNTER SYMPTOMS
COUGH: 1
SHORTNESS OF BREATH: 1
COUGH: 1
SHORTNESS OF BREATH: 1

## 2022-06-07 NOTE — ED TRIAGE NOTES
Patient presents via EMS for shortness of breath on exertion and chronic suicidal ideation.      Triage Assessment     Row Name 06/07/22 1441       Triage Assessment (Adult)    Airway WDL WDL       Respiratory WDL    Respiratory WDL X;rhythm/pattern    Rhythm/Pattern, Respiratory unlabored;shortness of breath       Skin Circulation/Temperature WDL    Skin Circulation/Temperature WDL WDL       Cardiac WDL    Cardiac WDL WDL       Peripheral/Neurovascular WDL    Peripheral Neurovascular WDL WDL       Cognitive/Neuro/Behavioral WDL    Cognitive/Neuro/Behavioral WDL WDL

## 2022-06-07 NOTE — ED NOTES
Group Home staff called and updated on care.  Ok to send pt. back by cab.  Will arrange transport.

## 2022-06-07 NOTE — TELEPHONE ENCOUNTER
Patient calling in reporting chest pain and shortness of breath/trouble getting air in. Recommended ED.    Zoë Jeong RN

## 2022-06-07 NOTE — ED NOTES
Bed: HW05  Expected date: 6/7/22  Expected time: 2:45 PM  Means of arrival:   Comments:  N718: 21 yof SOB; green

## 2022-06-07 NOTE — ED PROVIDER NOTES
[facility] EMERGENCY DEPARTMENT ([facility])    6/07/22        History     Chief Complaint   Patient presents with     Suicidal     Shortness of Breath     HPI  Sadaf Ross is a 21 year old female with a history of borderline personality disorder, bipolar 1 disorder, intellectual disability, and depression who presents to the Emergency Department with suicidal ideation and shortness of breath. Patient has been seen here in the ED frequently with similar complaints.  States that she has had symptoms with cough productive of sputum starting on Sunday, had some associated shortness of breath today, spoke to someone who recommended she present to the emergency department for evaluation.  Reports chronic suicidal ideation, no new thoughts or no new intent to attempt.  Denies HI, AVH, all other physical complaints.    Past Medical History  Past Medical History:   Diagnosis Date     ADHD (attention deficit hyperactivity disorder)      Bipolar 1 disorder (H)      Borderline personality disorder (H)      Depression      Depressive disorder      Intellectual disability      Obesity      Syncope      Past Surgical History:   Procedure Laterality Date     APPENDECTOMY       APPENDECTOMY       ARIPiprazole ER (ABILIFY MAINTENA) 400 MG extended release inj syringe  benztropine (COGENTIN) 0.5 MG tablet  calcium carbonate (TUMS) 500 MG chewable tablet  chlorhexidine (PERIDEX) 0.12 % solution  docusate sodium (COLACE) 100 MG capsule  hydrOXYzine (ATARAX) 50 MG tablet  metoclopramide (REGLAN) 10 MG tablet  norgestimate-ethinyl estradiol (ORTHO-CYCLEN) 0.25-35 MG-MCG tablet  OLANZapine (ZYPREXA) 2.5 MG tablet  OLANZapine zydis (ZYPREXA) 5 MG ODT  omeprazole (PRILOSEC) 40 MG DR capsule  ondansetron (ZOFRAN) 4 MG tablet  pantoprazole (PROTONIX) 40 MG EC tablet  polyethylene glycol (MIRALAX) 17 g packet  prochlorperazine (COMPAZINE) 10 MG tablet  promethazine (PHENERGAN) 25 MG suppository  venlafaxine (EFFEXOR-XR) 150 MG  "24 hr capsule  Vitamin D, Cholecalciferol, 25 MCG (1000 UT) TABS      Allergies   Allergen Reactions     Penicillins Rash and Unknown     Family History  Family History   Problem Relation Age of Onset     Diabetes Type 1 Father      Cancer Paternal Grandfather      Social History   Social History     Tobacco Use     Smoking status: Current Every Day Smoker     Packs/day: 0.50     Years: 5.00     Pack years: 2.50     Types: Vaping Device     Smokeless tobacco: Never Used   Substance Use Topics     Alcohol use: No     Drug use: No      Past medical history, past surgical history, medications, allergies, family history, and social history were reviewed with the patient. No additional pertinent items.       Review of Systems   Respiratory: Positive for cough and shortness of breath.    Psychiatric/Behavioral: Positive for suicidal ideas.   All other systems reviewed and are negative.    A complete review of systems was performed with pertinent positives and negatives noted in the HPI, and all other systems negative.    Physical Exam   BP: 137/85  Pulse: 89  Temp: 99  F (37.2  C)  Resp: 16  Height: 162.6 cm (5' 4\")  Weight: 106.6 kg (235 lb)  SpO2: 98 %  Physical Exam  Vitals and nursing note reviewed.   Constitutional:       Appearance: She is not ill-appearing or toxic-appearing.   HENT:      Head: Normocephalic and atraumatic.      Right Ear: External ear normal.      Left Ear: External ear normal.      Nose: Nose normal.   Eyes:      Extraocular Movements: Extraocular movements intact.      Conjunctiva/sclera: Conjunctivae normal.   Cardiovascular:      Rate and Rhythm: Normal rate and regular rhythm.   Pulmonary:      Effort: Pulmonary effort is normal.      Breath sounds: Normal breath sounds.   Chest:      Chest wall: No tenderness.   Abdominal:      General: There is no distension.      Palpations: Abdomen is soft.      Tenderness: There is no abdominal tenderness.   Musculoskeletal:         General: No deformity " or signs of injury.      Cervical back: Neck supple. No rigidity.   Skin:     General: Skin is warm.      Findings: No rash.   Neurological:      General: No focal deficit present.      Mental Status: She is alert. Mental status is at baseline.   Psychiatric:         Mood and Affect: Mood normal.         Behavior: Behavior normal.         ED Course      Procedures       The medical record was reviewed and interpreted.  Current labs reviewed and interpreted.  Current images reviewed and interpreted: No evidence of acute cardiopulmonary disease.  Mental Health Risk Assessment      PSS-3    Date and Time Over the past 2 weeks have you felt down, depressed, or hopeless? Over the past 2 weeks have you had thoughts of killing yourself? Have you ever attempted to kill yourself? When did this last happen? User   06/07/22 1449 yes yes yes within the last month (but not today) AI      C-SSRS ([place])    Date and Time Q1 Wished to be Dead (Past Month) Q2 Suicidal Thoughts (Past Month) Q3 Suicidal Thought Method Q4 Suicidal Intent without Specific Plan Q5 Suicide Intent with Specific Plan Q6 Suicide Behavior (Lifetime) Within the Past 3 Months? RETIRED: Level of Risk per Screen Screening Not Complete User   06/07/22 1449 yes yes yes no yes yes -- -- -- AI                Item Assessment   Suicidal Ideation Suicidal thoughts with method (no specific plan or intent to act)   Plan Does not elaborate   Intent Does not elaborate   Suicidal or self-harm behaviors None   Risk Factors Chronic throughts, mental health issues   Protective Factors Lives in group home who prevent and monitor such behaviors              Results for orders placed or performed during the hospital encounter of 06/07/22   Chest XR,  PA & LAT     Status: None    Narrative    CHEST TWO VIEWS   6/7/2022 3:46 PM     HISTORY:  Shortness of breath.    COMPARISON: Chest x-ray on 6/4/2022.      Impression    IMPRESSION: PA and lateral views of the chest were  obtained.  Cardiomediastinal silhouette is within normal limits. No suspicious  focal pulmonary opacities. No significant pleural effusion or  pneumothorax.    MARK HADDAD MD         SYSTEM ID:  [id number]   Symptomatic; Unknown Influenza A/B & SARS-CoV2 (COVID-19) Virus PCR Multiplex Nasopharyngeal     Status: Normal    Specimen: Nasopharyngeal; Swab   Result Value Ref Range    Influenza A PCR Negative Negative    Influenza B PCR Negative Negative    SARS CoV2 PCR Negative Negative    Narrative    Testing was performed using the nestor SARS-CoV-2 & Influenza A/B Assay on the nestor Tamar System. This test should be ordered for the detection of SARS-CoV-2 and influenza viruses in individuals who meet clinical and/or epidemiological criteria. Test performance is unknown in asymptomatic patients. This test is for in vitro diagnostic use under the FDA EUA for laboratories certified under CLIA to perform moderate and/or high complexity testing. This test has not been FDA cleared or approved. A negative result does not rule out the presence of PCR inhibitors in the specimen or target RNA in concentration below the limit of detection for the assay. If only one viral target is positive but coinfection with multiple targets is suspected, the sample should be re-tested with another FDA cleared, approved or authorized test, if coinfection would change clinical management. [facility] are certified under the Clinical Laboratory Improvement Amendments of 1988 (CLIA-88) as  qualified to perform moderate and/or high complexity laboratory testing.     Medications - No data to display     Assessments & Plan (with Medical Decision Making)   MDM:    21 year old female with extensive previous past medical history who presents with cough, shortness of breath, chronic suicidal ideation.  No new change in her ideation, she states she has intense but does not elaborate further, this is consistent with her previous  presentations.  Spoke on the phone with back  and this is consistent with her similar presentations, therefore do not believe that she would benefit from formal back assessment unless she makes declarative statements that are otherwise different from her baseline.  Chest x-ray with no acute cardiopulmonary disease, she is PERC negative.  Viral testing unremarkable, recommended discharge home with symptomatic treatment, she states that she is ready to go back to her group home.;    On reevaluation she is clinically unchanged.  I discussed all test results and the plan of care with the patient, who is agreeable to discharge with outpatient follow-up.  Strict return precautions given all questions answered prior to discharge.    I have reviewed the nursing notes. I have reviewed the findings, diagnosis, plan and need for follow up with the patient.    Discharge Medication List as of 6/7/2022  4:27 PM          Final diagnoses:   Shortness of breath   Viral URI   Verbalizes suicidal thoughts       --  Natacha Beatty MD  [facility] EMERGENCY DEPARTMENT  6/7/2022

## 2022-06-07 NOTE — TELEPHONE ENCOUNTER
Nurse Triage SBAR    Is this a 2nd Level Triage? NO    Situation: Cough    Background: Patient calling. Recently in the hospital. Cough started this morning.     Assessment: Coughing so hard she is vomiting. Productive cough - white sputum. Some shortness of breath when she is laying down, denied shortness of breath at this time. Some chest pain when she coughs. Temp: 98.0 O2: 99%    Protocol Recommended Disposition: See Physician within 4 hours    Recommendation: According to the protocol, Patient should be seen within 4 hours. Advised Patient to make an appointment. Care advice given. Patient verbalizes understanding and agrees with plan of care. Reviewed concerning symptoms and when to call back.       COVID 19 Nurse Triage Plan/Patient Instructions    Please be aware that novel coronavirus (COVID-19) may be circulating in the community. If you develop symptoms such as fever, cough, or SOB or if you have concerns about the presence of another infection including coronavirus (COVID-19), please contact your health care provider or visit [email or web address].     Disposition/Instructions    In-Person Visit with provider recommended. Reference Visit Selection Guide.    Thank you for taking steps to prevent the spread of this virus.  o Limit your contact with others.  o Wear a simple mask to cover your cough.  o Wash your hands well and often.    Resources    [facility]: About COVID-19: [email or web address]    CDC: What to Do If You're Sick: www.cdc.gov/coronavirus/2019-ncov/about/steps-when-sick.html    CDC: Ending Home Isolation: www.cdc.gov/coronavirus/2019-ncov/hcp/disposition-in-home-patients.html     CDC: Caring for Someone: www.cdc.gov/coronavirus/2019-ncov/if-you-are-sick/care-for-someone.html     [facility]: Interim Guidance for Hospital Discharge to Home: www.health.[facility].[place]/diseases/coronavirus/hcp/hospdischarge.pdf    [facility] clinical trials (COVID-19 research  studies): [email or web address]-clinical-trials     Below are the [employer]imo[employer]-19 hotlines at the [place] Department of Health ([facility]). Interpreters are available.   o For health questions: Call 050-724-5983 or 1-982.462.3295 (7 a.m. to 7 p.m.)  o For questions about schools and childcare: Call 550-378-6458 or 1-524.171.1895 (7 a.m. to 7 p.m.)     Megan Lopez RN Nursing Advisor 6/7/2022 6:44 PM     Reason for Disposition    Wheezing is present    Additional Information    Negative: Severe difficulty breathing (e.g., struggling for each breath, speaks in single words)    Negative: Bluish (or gray) lips or face now    Negative: [1] Difficulty breathing AND [2] exposure to flames, smoke, or fumes    Negative: [1] Stridor AND [2] difficulty breathing    Negative: Sounds like a life-threatening emergency to the triager    Negative: [1] Previous asthma attacks AND [2] this feels like asthma attack    Negative: Dry (non-productive) cough (i.e., no sputum or minimal clear sputum)    Negative: Chest pain  (Exception: MILD central chest pain, present only when coughing)    Negative: Difficulty breathing    Negative: Patient sounds very sick or weak to the triager    Negative: [1] Coughed up blood AND [2] > 1 tablespoon (15 ml) (Exception: blood-tinged sputum)    Negative: Fever > 103 F (39.4 C)    Negative: [1] Fever > 101 F (38.3 C) AND [2] age > 60    Negative: [1] Fever > 100.0 F (37.8 C) AND [2] bedridden (e.g., nursing home patient, CVA, chronic illness, recovering from surgery)    Negative: [1] Fever > 100.0 F (37.8 C) AND [2] diabetes mellitus or weak immune system (e.g., HIV positive, cancer chemo, splenectomy, organ transplant, chronic steroids)    Protocols used: COUGH - ACUTE [id number]

## 2022-06-07 NOTE — ED PROVIDER NOTES
[facility] EMERGENCY DEPARTMENT ([facility])    6/06/22        History     Chief Complaint   Patient presents with     Suicidal     Plan to cut self     Aggressive Behavior     Pt reported that  staff and other residents were messing with her, started to punch wall.     HPI  Sadaf Ross is a 21 year old female with past medical history significant for bipolar 1 disorder, borderline personality disorder, intellectual disability, depression, ADHD who presents to the ED for evaluation of SI.  In triage, patient reported a plan to cut herself.  She was also displaying aggressive behavior at her group home and punched a wall.  Here in the ED, patient is not displaying aggressive behavior.  It is quite late and patient is sleepy.  Patient is not actively endorsing suicidal thoughts at this time.  She did not object to possibly returning to her group home to sleep  Past Medical History  Past Medical History:   Diagnosis Date     ADHD (attention deficit hyperactivity disorder)      Bipolar 1 disorder (H)      Borderline personality disorder (H)      Depression      Depressive disorder      Intellectual disability      Obesity      Syncope      Past Surgical History:   Procedure Laterality Date     APPENDECTOMY       APPENDECTOMY       benztropine (COGENTIN) 0.5 MG tablet  calcium carbonate (TUMS) 500 MG chewable tablet  chlorhexidine (PERIDEX) 0.12 % solution  docusate sodium (COLACE) 100 MG capsule  hydrOXYzine (ATARAX) 50 MG tablet  metoclopramide (REGLAN) 10 MG tablet  OLANZapine (ZYPREXA) 2.5 MG tablet  OLANZapine zydis (ZYPREXA) 5 MG ODT  omeprazole (PRILOSEC) 40 MG DR capsule  ondansetron (ZOFRAN) 4 MG tablet  pantoprazole (PROTONIX) 40 MG EC tablet  polyethylene glycol (MIRALAX) 17 g packet  prochlorperazine (COMPAZINE) 10 MG tablet  promethazine (PHENERGAN) 25 MG suppository  venlafaxine (EFFEXOR-XR) 150 MG 24 hr capsule  Vitamin D, Cholecalciferol, 25 MCG (1000 UT) TABS  ARIPiprazole ER (ABILIFY  MAINTENA) 400 MG extended release inj syringe  norgestimate-ethinyl estradiol (ORTHO-CYCLEN) 0.25-35 MG-MCG tablet      Allergies   Allergen Reactions     Penicillins Rash and Unknown     Family History  Family History   Problem Relation Age of Onset     Diabetes Type 1 Father      Cancer Paternal Grandfather      Social History   Social History     Tobacco Use     Smoking status: Current Every Day Smoker     Packs/day: 0.50     Years: 5.00     Pack years: 2.50     Types: Vaping Device     Smokeless tobacco: Never Used   Substance Use Topics     Alcohol use: No     Drug use: No      Past medical history, past surgical history, medications, allergies, family history, and social history were reviewed with the patient. No additional pertinent items.       Review of Systems   Psychiatric/Behavioral: Negative for agitation and suicidal ideas.   All other systems reviewed and are negative.    A complete review of systems was performed with pertinent positives and negatives noted in the HPI, and all other systems negative.    Physical Exam   BP: 122/84  Pulse: 104  Temp: 98.1  F (36.7  C)  Resp: 18  SpO2: 98 %  Physical Exam  Vitals and nursing note reviewed.   Constitutional:       General: She is not in acute distress.     Appearance: She is well-developed. She is not ill-appearing.   HENT:      Head: Normocephalic and atraumatic.      Right Ear: External ear normal.      Left Ear: External ear normal.      Nose: Nose normal.   Eyes:      Extraocular Movements: Extraocular movements intact.      Conjunctiva/sclera: Conjunctivae normal.   Pulmonary:      Effort: Pulmonary effort is normal. No respiratory distress.   Abdominal:      General: There is no distension.   Musculoskeletal:         General: No swelling or deformity.      Cervical back: Normal range of motion and neck supple.   Skin:     General: Skin is warm and dry.   Neurological:      Mental Status: Mental status is at baseline.      Comments: Oriented,  sleepy, cooperative   Psychiatric:         Mood and Affect: Mood normal.         Behavior: Behavior normal.         ED Course     11:18 PM  The patient was seen and examined by Richie Sinha DO in Room HW08.     Procedures         No results found for any visits on 06/06/22.  Medications   benzocaine-menthol (CEPACOL) 15-3.6 MG lozenge 1 lozenge (1 lozenge Buccal Given 6/6/22 5933)        Assessments & Plan (with Medical Decision Making)   21-year-old female with a history of frequent suicidal ideation currently a resident of a group home presents to us with the same.  She spent several hours in the emergency department and was able to go to sleep.  She is not actively suicidal at this time.  As the patient has chronic suicidal thoughts and seems to be improved at this point we will discharge her back to her group home.  Patient was agreeable    I have reviewed the nursing notes. I have reviewed the findings, diagnosis, plan and need for follow up with the patient.    New Prescriptions    No medications on file       Final diagnoses:   Suicidal thoughts     I, Rere Rea, am serving as a trained medical scribe to document services personally performed by Richie Sinha DO based on the provider's statements to me on June 6, 2022.  This document has been checked and approved by the attending provider.    I, Richie Sinha DO, was physically present and have reviewed and verified the accuracy of this note documented by Rere Rea, medical scribe.      Richie Sinha DO      --  Richie CANO [facility] EMERGENCY DEPARTMENT  6/6/2022     Richei Sinha DO  06/06/22 3914

## 2022-06-08 ENCOUNTER — PATIENT OUTREACH (OUTPATIENT)
Dept: CARE COORDINATION | Facility: CLINIC | Age: 22
End: 2022-06-08
Payer: MEDICARE

## 2022-06-08 DIAGNOSIS — Z71.89 OTHER SPECIFIED COUNSELING: ICD-10-CM

## 2022-06-08 NOTE — PROGRESS NOTES
Clinic Care Coordination Contact    Background: Care Coordination referral placed from [facility] discharge report for reason of patient meeting criteria for a TCM outreach call by [facility] team.    Assessment: Upon chart review, [facility] Team member will cancel/close the referral for TCM outreach due to reason below:    Patient has discharged to a Group home, Memory Care or Nursing Home    Plan: Care Coordination referral for TCM outreach canceled.    Aury Duffy MA  [facility] Care Resource [place], [facility]

## 2022-06-08 NOTE — ED NOTES
Arlene,  staff at [phone], was informed about patient's discharge back to . Arlene said patient can take taxi back to .

## 2022-06-08 NOTE — DISCHARGE INSTRUCTIONS
Please make an appointment to follow up with [facility] (phone: [phone]) in 1-2 days.

## 2022-06-08 NOTE — ED PROVIDER NOTES
[facility] EMERGENCY DEPARTMENT ([facility])  6/07/22    History     Chief Complaint   Patient presents with     Nasal Congestion     Discharged from here 6 hours ago, cough and congestion when lying flat.     HPI  Sadaf Ross is a 21 year old female with PMH significant for borderline personality disorder, bipolar 1 disorder, intellectual disability, and depression who presents to the ED for evaluation of cough and congestion.  Patient was seen in the ED earlier today for the same symptoms.  She denies any new symptoms and reports that the cough is bothering her the most.  She reports that this is the same cough that she was seen for previously.  Patient denies any change in her breathing since discharge.  She denies trying any home medications for this.    Per review of the chart, patient was seen in this ED earlier today for cough and shortness of breath.  Patient had CXR with no acute pulmonary disease and she was PERC negative.  Her viral testing was unremarkable and patient was recommended to be discharged with symptomatic treatment.    Past Medical History  Past Medical History:   Diagnosis Date     ADHD (attention deficit hyperactivity disorder)      Bipolar 1 disorder (H)      Borderline personality disorder (H)      Depression      Depressive disorder      Intellectual disability      Obesity      Syncope      Past Surgical History:   Procedure Laterality Date     APPENDECTOMY       APPENDECTOMY       ARIPiprazole ER (ABILIFY MAINTENA) 400 MG extended release inj syringe  benzonatate (TESSALON) 100 MG capsule  benztropine (COGENTIN) 0.5 MG tablet  calcium carbonate (TUMS) 500 MG chewable tablet  chlorhexidine (PERIDEX) 0.12 % solution  docusate sodium (COLACE) 100 MG capsule  hydrOXYzine (ATARAX) 50 MG tablet  metoclopramide (REGLAN) 10 MG tablet  norgestimate-ethinyl estradiol (ORTHO-CYCLEN) 0.25-35 MG-MCG tablet  OLANZapine (ZYPREXA) 2.5 MG tablet  OLANZapine zydis (ZYPREXA) 5 MG  ODT  omeprazole (PRILOSEC) 40 MG DR capsule  ondansetron (ZOFRAN) 4 MG tablet  pantoprazole (PROTONIX) 40 MG EC tablet  polyethylene glycol (MIRALAX) 17 g packet  prochlorperazine (COMPAZINE) 10 MG tablet  promethazine (PHENERGAN) 25 MG suppository  venlafaxine (EFFEXOR-XR) 150 MG 24 hr capsule  Vitamin D, Cholecalciferol, 25 MCG (1000 UT) TABS      Allergies   Allergen Reactions     Penicillins Rash and Unknown     Family History  Family History   Problem Relation Age of Onset     Diabetes Type 1 Father      Cancer Paternal Grandfather      Social History   Social History     Tobacco Use     Smoking status: Current Every Day Smoker     Packs/day: 0.50     Years: 5.00     Pack years: 2.50     Types: Vaping Device     Smokeless tobacco: Never Used   Substance Use Topics     Alcohol use: No     Drug use: No      Past medical history, past surgical history, medications, allergies, family history, and social history were reviewed with the patient. No additional pertinent items.       Review of Systems   Respiratory: Positive for cough and shortness of breath.    All other systems reviewed and are negative.    A complete review of systems was performed with pertinent positives and negatives noted in the HPI, and all other systems negative.    Physical Exam   BP: 133/73  Pulse: 98  Temp: 98  F (36.7  C)  Resp: 18  SpO2: 100 %  Physical Exam  Vitals and nursing note reviewed.   Constitutional:       General: She is not in acute distress.     Appearance: She is not diaphoretic.   HENT:      Head: Atraumatic.      Mouth/Throat:      Pharynx: No oropharyngeal exudate.   Eyes:      General: No scleral icterus.     Pupils: Pupils are equal, round, and reactive to light.   Cardiovascular:      Rate and Rhythm: Normal rate and regular rhythm.      Heart sounds: Normal heart sounds.   Pulmonary:      Effort: No respiratory distress.      Breath sounds: Normal breath sounds.   Abdominal:      General: Bowel sounds are normal.       Palpations: Abdomen is soft.      Tenderness: There is no abdominal tenderness.   Musculoskeletal:         General: No tenderness.   Skin:     General: Skin is warm.      Findings: No rash.           ED Course      Procedures   10:04 PM  The patient was seen and examined by Becky Duggan MD in Room ED02.      Mental Health Risk Assessment      PSS-3    Date and Time Over the past 2 weeks have you felt down, depressed, or hopeless? Over the past 2 weeks have you had thoughts of killing yourself? Have you ever attempted to kill yourself? When did this last happen? User   06/07/22 1951 yes yes yes more than 6 months ago SMS               Results for orders placed or performed during the hospital encounter of 06/07/22   Chest XR,  PA & LAT     Status: None    Narrative    CHEST TWO VIEWS   6/7/2022 3:46 PM     HISTORY:  Shortness of breath.    COMPARISON: Chest x-ray on 6/4/2022.      Impression    IMPRESSION: PA and lateral views of the chest were obtained.  Cardiomediastinal silhouette is within normal limits. No suspicious  focal pulmonary opacities. No significant pleural effusion or  pneumothorax.    MARK HADDAD MD         SYSTEM ID:  [id number]   Symptomatic; Unknown Influenza A/B & SARS-CoV2 (COVID-19) Virus PCR Multiplex Nasopharyngeal     Status: Normal    Specimen: Nasopharyngeal; Swab   Result Value Ref Range    Influenza A PCR Negative Negative    Influenza B PCR Negative Negative    SARS CoV2 PCR Negative Negative    Narrative    Testing was performed using the nestor SARS-CoV-2 & Influenza A/B Assay on the nestor Tamar System. This test should be ordered for the detection of SARS-CoV-2 and influenza viruses in individuals who meet clinical and/or epidemiological criteria. Test performance is unknown in asymptomatic patients. This test is for in vitro diagnostic use under the FDA EUA for laboratories certified under CLIA to perform moderate and/or high complexity testing. This test has not been FDA  cleared or approved. A negative result does not rule out the presence of PCR inhibitors in the specimen or target RNA in concentration below the limit of detection for the assay. If only one viral target is positive but coinfection with multiple targets is suspected, the sample should be re-tested with another FDA cleared, approved or authorized test, if coinfection would change clinical management. [facility] are certified under the Clinical Laboratory Improvement Amendments of 1988 (CLIA-88) as  qualified to perform moderate and/or high complexity laboratory testing.     Medications   benzonatate (TESSALON) capsule 200 mg (has no administration in time range)        Assessments & Plan (with Medical Decision Making)     21 year old female with PMH significant for borderline personality disorder, bipolar 1 disorder, intellectual disability, and depression who presents to the ED for the second time today for evaluation of cough and congestion.  Vital signs stable in triage with blood pressure 133/33, pulse normal at 98, respiration normal 18, pulse ox normal 100% on room air.  Patient afebrile at 98.3  F.  Patient given Tessalon Perles here in the emergency department and discharged with a prescription for same.  Plan to follow-up with primary care provider for further evaluation care.    I have reviewed the nursing notes. I have reviewed the findings, diagnosis, plan and need for follow up with the patient.    New Prescriptions    BENZONATATE (TESSALON) 100 MG CAPSULE    Take 1 capsule (100 mg) by mouth 3 times daily as needed for cough       Final diagnoses:   Cough     Murray MCGINNIS, am serving as a trained medical scribe to document services personally performed by Becky Duggan MD, based on the provider's statements to me.     IBecky MD, was physically present and have reviewed and verified the accuracy of this note documented by Murray Zaman.    --  MD RACHAEL Alberto  [facility] EMERGENCY DEPARTMENT  6/7/2022     Becky Duggan MD  06/09/22 2124

## 2022-06-08 NOTE — ED TRIAGE NOTES
"Pt states\" I am wheezing and I can't sleep,\"     Triage Assessment     Row Name 06/07/22 1958       Triage Assessment (Adult)    Airway WDL WDL       Respiratory WDL    Respiratory WDL WDL       Skin Circulation/Temperature WDL    Skin Circulation/Temperature WDL WDL       Cardiac WDL    Cardiac WDL WDL       Peripheral/Neurovascular WDL    Peripheral Neurovascular WDL WDL       Cognitive/Neuro/Behavioral WDL    Cognitive/Neuro/Behavioral WDL WDL                 Triage Assessment     Row Name 06/07/22 1958       Triage Assessment (Adult)    Airway WDL WDL       Respiratory WDL    Respiratory WDL WDL       Skin Circulation/Temperature WDL    Skin Circulation/Temperature WDL WDL       Cardiac WDL    Cardiac WDL WDL       Peripheral/Neurovascular WDL    Peripheral Neurovascular WDL WDL       Cognitive/Neuro/Behavioral WDL    Cognitive/Neuro/Behavioral WDL WDL              "

## 2022-06-09 ENCOUNTER — NURSE TRIAGE (OUTPATIENT)
Dept: NURSING | Facility: CLINIC | Age: 22
End: 2022-06-09
Payer: MEDICARE

## 2022-06-09 NOTE — TELEPHONE ENCOUNTER
Pt calling about retesting for Covid. See previous triage message.     Advised pt to contact her PCP. Reviewed home care for cough with pt per Care Advice.     Pt verbalizes understanding and agrees to plan.     Reason for Disposition    COVID-19 Testing, questions about    Protocols used: CORONAVIRUS (COVID-19) DIAGNOSED OR [id number] 1.18.2022

## 2022-06-09 NOTE — TELEPHONE ENCOUNTER
Patient loss her sense of taste and smell.  Today this happened.  Patient took covid test yesterday and was negative.  Patient currently goes to the [facility].  Patient states that she will take another covid test.      COVID 19 Nurse Triage Plan/Patient Instructions    Please be aware that novel coronavirus (COVID-19) may be circulating in the community. If you develop symptoms such as fever, cough, or SOB or if you have concerns about the presence of another infection including coronavirus (COVID-19), please contact your health care provider or visit [email or web address].     Disposition/Instructions    In-Person Visit with provider recommended. Reference Visit Selection Guide.    Thank you for taking steps to prevent the spread of this virus.  o Limit your contact with others.  o Wear a simple mask to cover your cough.  o Wash your hands well and often.    Resources    [facility]: About COVID-19: [email or web address]    CDC: What to Do If You're Sick: www.cdc.gov/coronavirus/2019-ncov/about/steps-when-sick.html    CDC: Ending Home Isolation: www.cdc.gov/coronavirus/2019-ncov/hcp/disposition-in-home-patients.html     CDC: Caring for Someone: www.cdc.gov/coronavirus/2019-ncov/if-you-are-sick/care-for-someone.html     [facility]: Interim Guidance for Hospital Discharge to Home: www.health.[facility].mn.us/diseases/coronavirus/hcp/hospdischarge.pdf    [facility] clinical trials (COVID-19 research studies): [email or web address].[facility]/umn-clinical-trials     Below are the COVID-19 hotlines at the [facility] ([facility]). Interpreters are available.   o For health questions: Call 175-607-0206 or 1-365.460.7144 (7 a.m. to 7 p.m.)  o For questions about schools and childcare: Call 859-439-7701 or 1-250.163.3284 (7 a.m. to 7 p.m.)                       Reason for Disposition    [1] COVID-19 infection suspected by caller or triager AND [2] mild symptoms (cough, fever, or others) AND  [3] negative COVID-19 rapid test    Additional Information    Negative: SEVERE difficulty breathing (e.g., struggling for each breath, speaks in single words)    Negative: Difficult to awaken or acting confused (e.g., disoriented, slurred speech)    Negative: Bluish (or gray) lips or face now    Negative: Shock suspected (e.g., cold/pale/clammy skin, too weak to stand, low BP, rapid pulse)    Negative: Sounds like a life-threatening emergency to the triager    Negative: SEVERE or constant chest pain or pressure  (Exception: Mild central chest pain, present only when coughing.)    Negative: MODERATE difficulty breathing (e.g., speaks in phrases, SOB even at rest, pulse 100-120)    Negative: Headache and stiff neck (can't touch chin to chest)    Negative: Oxygen level (e.g., pulse oximetry) 90 percent or lower    Negative: Chest pain or pressure    Negative: Patient sounds very sick or weak to the triager    Negative: MILD difficulty breathing (e.g., minimal/no SOB at rest, SOB with walking, pulse <100)    Negative: Fever > 103 F (39.4 C)    Negative: [1] Fever > 101 F (38.3 C) AND [2] over 60 years of age    Negative: [1] Fever > 100.0 F (37.8 C) AND [2] bedridden (e.g., nursing home patient, CVA, chronic illness, recovering from surgery)    Negative: HIGH RISK for severe COVID complications (e.g., weak immune system, age > 64 years, obesity with BMI > 25, pregnant, chronic lung disease or other chronic medical condition) (Exception: Already seen by PCP and no new or worsening symptoms.)    Negative: [1] HIGH RISK patient AND [2] influenza is widespread in the community AND [3] ONE OR MORE respiratory symptoms: cough, sore throat, runny or stuffy nose    Negative: [1] HIGH RISK patient AND [2] influenza exposure within the last 7 days AND [3] ONE OR MORE respiratory symptoms: cough, sore throat, runny or stuffy nose    Negative: Oxygen level (e.g., pulse oximetry) 91 to 94 percent    Protocols used: CORONAVIRUS  (COVID-19) DIAGNOSED OR [id number] 1.18.2022

## 2022-06-10 ENCOUNTER — NURSE TRIAGE (OUTPATIENT)
Dept: NURSING | Facility: CLINIC | Age: 22
End: 2022-06-10
Payer: MEDICARE

## 2022-06-10 LAB — BACTERIA SPEC CULT: NORMAL

## 2022-06-11 ENCOUNTER — TELEPHONE (OUTPATIENT)
Dept: NURSING | Facility: CLINIC | Age: 22
End: 2022-06-11
Payer: MEDICARE

## 2022-06-11 NOTE — TELEPHONE ENCOUNTER
Sadaf is calling regarding on-going symptoms, as noted below.    She was advised to be seen in the ER.  She proceeded to [facility] ER but left because of the long wait.    ER advised - [facility] suggested  Call for an ambulance due to no transportation      Amarilis Woods RN  [facility]

## 2022-06-11 NOTE — TELEPHONE ENCOUNTER
See Addendum to Nurse Triage encounter on 6/10/22 with EZEQUIEL Madsen, EZEQUIEL  [facility]

## 2022-06-11 NOTE — TELEPHONE ENCOUNTER
"Pt calling after talking to her aunt and uncle who thinks she has covid     Pt took an at home test today which was negative and she is \"not sure who to believe\"     Reports chest pain, lightheadedness, dizziness, sweatiness, and productive cough with frequent throat clearing     Pt states chest pain started 5-6 days ago, describes it as sharp and rates it a 9/10 and has been getting worse     Denies difficulty breathing and is afebrile     Reports that she's drinking as much water as she can but hasn't been able to eat much as she has a sensitive stomach \"all the time\"    Pt states that she stood up from her bed today and \"fell right back down onto the bed\" d/t lightheadedness     She is vaccinated and boosted     Per protocol, pt advised to go to the ED, pt lives in a group home and states she doesn't know what to do. Advised pt to tell group home staff so they can help her get there    Pt agreeable       Reason for Disposition    SEVERE or constant chest pain or pressure  (Exception: Mild central chest pain, present only when coughing.)    Additional Information    Negative: SEVERE difficulty breathing (e.g., struggling for each breath, speaks in single words)    Negative: Difficult to awaken or acting confused (e.g., disoriented, slurred speech)    Negative: Bluish (or gray) lips or face now    Negative: Shock suspected (e.g., cold/pale/clammy skin, too weak to stand, low BP, rapid pulse)    Negative: Sounds like a life-threatening emergency to the triager    Negative: [1] Diagnosed or suspected COVID-19 AND [2] symptoms lasting 3 or more weeks    Negative: [1] COVID-19 exposure AND [2] no symptoms    Negative: COVID-19 vaccine reaction suspected (e.g., fever, headache, muscle aches) occurring 1 to 3 days after getting vaccine    Negative: COVID-19 vaccine, questions about    Negative: [1] Lives with someone known to have influenza (flu test positive) AND [2] flu-like symptoms (e.g., cough, runny nose, sore " throat, SOB; with or without fever)    Negative: [1] Adult with possible COVID-19 symptoms AND [2] triager concerned about severity of symptoms or other causes    Negative: COVID-19 and breastfeeding, questions about    Protocols used: CORONAVIRUS (COVID-19) DIAGNOSED OR [id number] 1.18.2022    COVID 19 Nurse Triage Plan/Patient Instructions    Please be aware that novel coronavirus (COVID-19) may be circulating in the community. If you develop symptoms such as fever, cough, or SOB or if you have concerns about the presence of another infection including coronavirus (COVID-19), please contact your health care provider or visit [email or web address].     Disposition/Instructions    ED Visit recommended. Follow protocol based instructions.     Bring Your Own Device:  Please also bring your smart device(s) (smart phones, tablets, laptops) and their charging cables for your personal use and to communicate with your care team during your visit.    Thank you for taking steps to prevent the spread of this virus.  o Limit your contact with others.  o Wear a simple mask to cover your cough.  o Wash your hands well and often.    Resources    [facility]: About COVID-19: [email or web address]    CDC: What to Do If You're Sick: www.cdc.gov/coronavirus/2019-ncov/about/steps-when-sick.html    CDC: Ending Home Isolation: www.cdc.gov/coronavirus/2019-ncov/hcp/disposition-in-home-patients.html     CDC: Caring for Someone: www.cdc.gov/coronavirus/2019-ncov/if-you-are-sick/care-for-someone.html     [facility]: Interim Guidance for Hospital Discharge to Home: www.health.[facility].mn.us/diseases/coronavirus/hcp/hospdischarge.pdf    [facility] clinical trials (COVID-19 research studies): clinicalaffairs.[facility]/umn-clinical-trials     Below are the COVID-19 hotlines at the [place] Department of [facility] ([facility]). Interpreters are available.   o For health questions: Call 663-816-2589 or 1-349.611.8732 (7 a.m.  to 7 p.m.)  o For questions about schools and childcare: Call 314-848-3674 or 1-691.503.6143 (7 a.m. to 7 p.m.)         Stacie Francis RN [facility] Elidia 10, 2022 7:54 PM

## 2022-06-12 ENCOUNTER — NURSE TRIAGE (OUTPATIENT)
Dept: NURSING | Facility: CLINIC | Age: 22
End: 2022-06-12
Payer: MEDICARE

## 2022-06-12 NOTE — TELEPHONE ENCOUNTER
"Pt calling with abdominal pain that started 5-10 minutes ago and states she feels weak     Rates pain 9/10 that she states is both constant and intermittent and describes as an aching and is located in her lower abdomen     States she thinks she is going to have her period soon and this pain comes every month     She has not tried anything to help with the pain     Home care advised per protocol, recommended taking ibuprofen and trying different positions to lay in also using heating pad or taking a warm bath and using distraction     Pt refusing all advice and said she can't stop thinking about the pain so she can't sleep     Pt states she has hydroxizine that she takes for anxiety \"but it doesn't really help, all it does is put me to sleep\"    Advised she talk to the staff at the group home to help get her something for pain or give hydroxizine, pt again refuses and states \"i'll just sit and suffer I guess\"     She asks what to do in the mean time and she was advised to call back if the pain gets worse after trying above interventions     Pt states \"whatever, bye\"       Reason for Disposition    Normal menstrual cramps    Additional Information    Negative: Sounds like a life-threatening emergency to the triager    Negative: Abdominal cramps unrelated to menstrual period    Negative: [1] SEVERE pain AND [2] pain clearly increases with coughing     States yes \"I think so\" but states it stays at a 9/10    Negative: Patient sounds very sick or weak to the triager    Negative: Fever > 100.4 F (38.0 C)    Negative: [1] MODERATE vaginal bleeding (i.e., soaking 1 pad or tampon per hour and present > 6 hours) AND [2] worse than ever before    Negative: [1] SEVERE vaginal bleeding (e.g., soaking 2 pads or tampons per hour and present 2 or more hours) AND [2] worse than ever before    Negative: Could be pregnant (e.g., missed last menstrual period)    Negative: [1] SEVERE pain (e.g., excruciating cramps) AND [2] worse than " ever before AND [3] not improved with ibuprofen or naproxen    Negative: [1] Pain present > 3 days AND [2] normally menstrual cramps last 1-3 days    Negative: Pain only on one side    Negative: Unusual vaginal discharge (e.g., odorous, yellow, green, or foamy-white)    Negative: [1] MODERATE pain (e.g., cramps interfere with normal activities) AND [2] not relieved by ibuprofen or naproxen used per Care Advice    Negative: [1] Pain present > 3 days AND [2] occurs with every menstrual period    Negative: Pain during sexual intercourse    Negative: Symptoms began at age over 21    Negative: Vomiting or diarrhea are also present    Protocols used: ABDOMINAL PAIN - MENSTRUAL CRAMPS-A-AH    COVID 19 Nurse Triage Plan/Patient Instructions    Please be aware that novel coronavirus (COVID-19) may be circulating in the community. If you develop symptoms such as fever, cough, or SOB or if you have concerns about the presence of another infection including coronavirus (COVID-19), please contact your health care provider or visit [email or web address].     Disposition/Instructions    Home care recommended. Follow home care protocol based instructions.    Thank you for taking steps to prevent the spread of this virus.  o Limit your contact with others.  o Wear a simple mask to cover your cough.  o Wash your hands well and often.    Resources    [facility]: About COVID-19: [email or web address]    CDC: What to Do If You're Sick: www.cdc.gov/coronavirus/2019-ncov/about/steps-when-sick.html    CDC: Ending Home Isolation: www.cdc.gov/coronavirus/2019-ncov/hcp/disposition-in-home-patients.html     CDC: Caring for Someone: www.cdc.gov/coronavirus/2019-ncov/if-you-are-sick/care-for-someone.html     [facility]: Interim Guidance for Hospital Discharge to Home: www.health.[facility].mn.us/diseases/coronavirus/hcp/hospdischarge.pdf    [facility] clinical trials (COVID-19 research studies):  clinicalaffairs.[facility]-clinical-trials     Below are the COVID-19 hotlines at the [place] Department of Health ([facility]). Interpreters are available.   o For health questions: Call 479-331-6432 or 1-734.310.9116 (7 a.m. to 7 p.m.)  o For questions about schools and childcare: Call 134-126-2109 or 1-499.806.6649 (7 a.m. to 7 p.m.)         Stacie Francis RN [facility] June 12, 2022 2:41 PM

## 2022-06-15 ENCOUNTER — NURSE TRIAGE (OUTPATIENT)
Dept: NURSING | Facility: CLINIC | Age: 22
End: 2022-06-15
Payer: MEDICARE

## 2022-06-15 NOTE — TELEPHONE ENCOUNTER
"Pt calling with chest pain     Rates pain 8/10 and describes it as a constant sharp pain that radiates to her stomach     Pain has been present now for the last 24 hours     States that it also feelings like a burning and she has had similar symptoms in the past with heartburn     Worse when laying down and has not tried anything to help with the pain     Poor appetite lately but has been trying to eat more fruits and vegetables and only has pop \"as a treat\"     Advised that pt take an antacid and avoid laying down after eating     Instructed pt to call her primary clinic to set up an appointment with her MD regarding these symptoms       Reason for Disposition    [1] Patient says chest pain feels exactly the same as previously diagnosed \"heartburn\" AND [2] describes burning in chest AND [3] accompanying sour taste in mouth    Additional Information    Negative: SEVERE difficulty breathing (e.g., struggling for each breath, speaks in single words)    Negative: Difficult to awaken or acting confused (e.g., disoriented, slurred speech)    Negative: Shock suspected (e.g., cold/pale/clammy skin, too weak to stand, low BP, rapid pulse)    Negative: Passed out (i.e., fainted, collapsed and was not responding)    Negative: [1] Chest pain lasts > 5 minutes AND [2] age > 44    Negative: [1] Chest pain lasts > 5 minutes AND [2] age > 30 AND [3] one or more cardiac risk factors (e.g., diabetes, high blood pressure, high cholesterol, smoker, or strong family history of heart disease)    Negative: [1] Chest pain lasts > 5 minutes AND [2] history of heart disease (i.e., angina, heart attack, heart failure, bypass surgery, takes nitroglycerin)    Negative: [1] Chest pain lasts > 5 minutes AND [2] described as crushing, pressure-like, or heavy     \"alittle heavy but I can manage\"    Negative: Heart beating < 50 beats per minute OR > 140 beats per minute    Negative: Visible sweat on face or sweat dripping down face     \"a little " "but I was just outside\"    Negative: Sounds like a life-threatening emergency to the triager    Negative: Followed a chest injury    Negative: SEVERE chest pain    Negative: [1] Chest pain (or \"angina\") comes and goes AND [2] is happening more often (increasing in frequency) or getting worse (increasing in severity) (Exception: chest pains that last only a few seconds)    Negative: Pain also in shoulder(s) or arm(s) or jaw (Exception: pain is clearly made worse by movement)    Negative: Difficulty breathing    Negative: Dizziness or lightheadedness    Negative: Coughing up blood    Negative: Cocaine use within last 3 days    Negative: Major surgery in past month    Negative: Hip or leg fracture (broken bone) in past month (or had cast on leg or ankle in past month)    Negative: Illness requiring prolonged bedrest in past month (e.g., immobilization, long hospital stay)    Negative: Long-distance travel in past month (e.g., car, bus, train, plane; with trip lasting 6 or more hours)    Negative: History of prior \"blood clot\" in leg or lungs (i.e., deep vein thrombosis, pulmonary embolism)    Negative: History of inherited increased risk of blood clots (e.g., Factor 5 Leiden, Anti-thrombin 3, Protein C or Protein S deficiency, Prothrombin mutation)    Negative: Cancer treatment in past six months (or has cancer now)    Negative: [1] Chest pain lasts > 5 minutes AND [2] occurred in past 3 days (72 hours) (Exception: feels exactly the same as previously diagnosed heartburn and has accompanying sour taste in mouth)    Negative: Taking a deep breath makes pain worse    Negative: Patient sounds very sick or weak to the triager    Negative: [1] Chest pain lasts > 5 minutes AND [2] occurred > 3 days ago (72 hours) AND [3] NO chest pain or cardiac symptoms now    Negative: [1] Chest pain lasting < 5 minutes AND [2] NO chest pain or cardiac symptoms (e.g., breathing difficulty, sweating) now (Exception: chest pains that last " "only a few seconds)    Negative: Fever > 100.4 F (38.0 C)    Negative: Rash in same area as pain (may be described as \"small blisters\")    Protocols used: CHEST PAIN-A-AH    COVID 19 Nurse Triage Plan/Patient Instructions    Please be aware that novel coronavirus (COVID-19) may be circulating in the community. If you develop symptoms such as fever, cough, or SOB or if you have concerns about the presence of another infection including coronavirus (COVID-19), please contact your health care provider or visit [email or web address].     Disposition/Instructions    In-Person Visit with provider recommended. Reference Visit Selection Guide.    Thank you for taking steps to prevent the spread of this virus.  o Limit your contact with others.  o Wear a simple mask to cover your cough.  o Wash your hands well and often.    Resources    [facility]: About COVID-19: [email or web address]    CDC: What to Do If You're Sick: www.cdc.gov/coronavirus/2019-ncov/about/steps-when-sick.html    CDC: Ending Home Isolation: www.cdc.gov/coronavirus/2019-ncov/hcp/disposition-in-home-patients.html     CDC: Caring for Someone: www.cdc.gov/coronavirus/2019-ncov/if-you-are-sick/care-for-someone.html     [facility]: Interim Guidance for Hospital Discharge to Home: www.health.[facility].[place]/diseases/coronavirus/hcp/hospdischarge.pdf    [facility] clinical trials (COVID-19 research studies): [email or web address]-clinical-trials     Below are the COVID-19 hotlines at the [place] Department of Health ([facility]). Interpreters are available.   o For health questions: Call 543-299-1126 or 1-116.119.2544 (7 a.m. to 7 p.m.)  o For questions about schools and childcare: Call 154-058-9019 or 1-773.733.6029 (7 a.m. to 7 p.m.)           Stacie Francis RN [facility] Elidia 15, 2022 5:13 PM          "

## 2022-06-16 ENCOUNTER — NURSE TRIAGE (OUTPATIENT)
Dept: NURSING | Facility: CLINIC | Age: 22
End: 2022-06-16
Payer: MEDICARE

## 2022-06-17 NOTE — TELEPHONE ENCOUNTER
Nurse Triage SBAR    Is this a 2nd Level Triage? NO    Situation: Patient calling with anxiety/panic attack.  Consent: not needed    Pt states that she is having a panic attack which is giving her chest pain  Pt states that she had just taken some Hydroxyzine  States that she does not know how her panic attack came on, gets them almost every day  Dizzy--lightheadedness like she's going to pass out; Dizzy for the last 24 hours  Has had this feeling before  What helps: laying down, But now feeling like having another panic attack  States things are rough in life right now  Not having anymore than usual difficulties  Lives in a group home--in room by herself right now  Has a psychiatrist  No new stressors or changes  Denies substance abuse  Drinks caffeine occasionally  States that half the time she does not know where she is  Vomiting every time she eats, states that she's been doing this since last year  Took ibuprofen around 1730 for the chest pain-- states that it hasn't helped  States anxiety has been really high lately  Has been speaking to psychiatrist about the symptoms she has been having  States that she can't go to the ER because she has been going so much that she could lose her housing  Feeling depressed  States not wanting to hurt herself or others  While on the call, pt went outside for a walk  States that medication changes can make her blow up with others      Protocol Recommended Disposition:   See PCP Within 3 Days, Go to ED Now    Recommendation: Advised patient to go to ED; however, pt states that she was told to stop going to the ED due to her frequency of visits to the ED and the possibility of losing her housing. This writer instructed patient to call her psychiatrist office in the morning to follow up with how she has been feeling lately. Reviewed concerning symptoms and when to call back.       Bonita Mendiola RN [facility] 6/16/2022 7:19 PM    Reason for Disposition    [1]  Lightheadedness or dizziness AND [2] persists > 10 minutes AND [3] not relieved by reassurance provided by triager    [1] Started on anti-anxiety medication AND [2] no relief    Additional Information    Negative: Severe difficulty breathing (e.g., struggling for each breath, speaks in single words)    Negative: Bluish (or gray) lips or face now    Negative: Difficult to awaken or acting confused (e.g., disoriented, slurred speech)    Negative: Hysterical or combative behavior    Negative: Sounds like a life-threatening emergency to the triager    Negative: [1] Difficulty breathing AND [2] persists > 10 minutes AND [3] not relieved by reassurance provided by triager    Protocols used: ANXIETY AND PANIC ATTACK-A-AH

## 2022-06-18 ENCOUNTER — HOSPITAL ENCOUNTER (EMERGENCY)
Facility: CLINIC | Age: 22
Discharge: HOME | End: 2022-06-18
Attending: EMERGENCY MEDICINE | Admitting: EMERGENCY MEDICINE
Payer: MEDICARE

## 2022-06-18 VITALS
DIASTOLIC BLOOD PRESSURE: 70 MMHG | TEMPERATURE: 99.3 F | OXYGEN SATURATION: 99 % | SYSTOLIC BLOOD PRESSURE: 119 MMHG | HEART RATE: 99 BPM

## 2022-06-18 DIAGNOSIS — R45.851 SUICIDAL THOUGHTS: ICD-10-CM

## 2022-06-18 PROCEDURE — 250N000013 HC RX MED GY IP 250 OP 250 PS 637: Performed by: EMERGENCY MEDICINE

## 2022-06-18 PROCEDURE — 99283 EMERGENCY DEPT VISIT LOW MDM: CPT | Performed by: EMERGENCY MEDICINE

## 2022-06-18 PROCEDURE — 99284 EMERGENCY DEPT VISIT MOD MDM: CPT | Performed by: EMERGENCY MEDICINE

## 2022-06-18 RX ORDER — HYDROXYZINE HYDROCHLORIDE 50 MG/1
50 TABLET, FILM COATED ORAL ONCE
Status: COMPLETED | OUTPATIENT
Start: 2022-06-18 | End: 2022-06-18

## 2022-06-18 RX ADMIN — HYDROXYZINE HYDROCHLORIDE 50 MG: 50 TABLET, FILM COATED ORAL at 11:53

## 2022-06-18 NOTE — ED PROVIDER NOTES
ED Provider Note  [facility] EMERGENCY DEPARTMENT ([facility])     June 18, 2022    History     Chief Complaint   Patient presents with     Mental Health Problem     HPI  Sadaf Ross is a 21 year old female with a past medical history including MDD, borderline personality disorder, SI, intellectual disability, ADHD, self-injurious behavior, psychosis who presents to the Emergency Department for evaluation of self-injurious and suicidal ideation.  Patient states that she began feeling like she was going to choke herself today so she called 911. Patient has been seen here in the ED frequently with similar complaints.  She denies any other issues at this point she did not try to kill herself today.  She does state symptoms are somewhat improving compared to when they started    Past Medical History  Past Medical History:   Diagnosis Date     ADHD (attention deficit hyperactivity disorder)      Bipolar 1 disorder (H)      Borderline personality disorder (H)      Depression      Depressive disorder      Intellectual disability      Obesity      Syncope      Past Surgical History:   Procedure Laterality Date     APPENDECTOMY       APPENDECTOMY       ARIPiprazole ER (ABILIFY MAINTENA) 400 MG extended release inj syringe  benzonatate (TESSALON) 100 MG capsule  benztropine (COGENTIN) 0.5 MG tablet  calcium carbonate (TUMS) 500 MG chewable tablet  chlorhexidine (PERIDEX) 0.12 % solution  docusate sodium (COLACE) 100 MG capsule  hydrOXYzine (ATARAX) 50 MG tablet  metoclopramide (REGLAN) 10 MG tablet  norgestimate-ethinyl estradiol (ORTHO-CYCLEN) 0.25-35 MG-MCG tablet  OLANZapine (ZYPREXA) 2.5 MG tablet  OLANZapine zydis (ZYPREXA) 5 MG ODT  omeprazole (PRILOSEC) 40 MG DR capsule  ondansetron (ZOFRAN) 4 MG tablet  polyethylene glycol (MIRALAX) 17 g packet  prochlorperazine (COMPAZINE) 10 MG tablet  promethazine (PHENERGAN) 25 MG suppository  venlafaxine (EFFEXOR-XR) 150 MG  24 hr capsule  Vitamin D, Cholecalciferol, 25 MCG (1000 UT) TABS      Allergies   Allergen Reactions     Penicillins Rash and Unknown     Past medical history, past surgical history, medications, and allergies were reviewed with the patient. Additional pertinent items: ADHD, self-injurious behavior, psychosis, MDD retrieved from Care Everywhere.    Family History  Family History   Problem Relation Age of Onset     Diabetes Type 1 Father      Cancer Paternal Grandfather      Family history was reviewed with the patient. Additional pertinent items: None    Social History  Social History     Tobacco Use     Smoking status: Current Every Day Smoker     Packs/day: 0.50     Years: 5.00     Pack years: 2.50     Types: Vaping Device     Smokeless tobacco: Never Used   Substance Use Topics     Alcohol use: No     Drug use: No      Social history was reviewed with the patient. Additional pertinent items: None      Review of Systems   Psychiatric/Behavioral: Positive for suicidal ideas.   All other systems reviewed and are negative.    A complete review of systems was performed with pertinent positives and negatives noted in the HPI, and all other systems negative.    Physical Exam   BP: 119/70  Pulse: 99  Temp: 99.3  F (37.4  C)  SpO2: 99 %  Physical Exam  Vitals and nursing note reviewed.   Constitutional:       General: She is not in acute distress.     Appearance: She is well-developed. She is not ill-appearing.   HENT:      Head: Normocephalic and atraumatic.      Right Ear: External ear normal.      Left Ear: External ear normal.      Nose: Nose normal.   Eyes:      Extraocular Movements: Extraocular movements intact.      Conjunctiva/sclera: Conjunctivae normal.   Pulmonary:      Effort: Pulmonary effort is normal. No respiratory distress.   Abdominal:      General: There is no distension.   Musculoskeletal:         General: No swelling or deformity.      Cervical back: Normal range of motion and neck supple.   Skin:      General: Skin is warm and dry.   Neurological:      Mental Status: Mental status is at baseline.      Comments: Alert, oriented   Psychiatric:         Mood and Affect: Mood normal.         Behavior: Behavior normal.         ED Course     11:01 AM  The patient was seen and examined by Richie Sinha DO in Room HW01.    Procedures                No results found for any visits on 06/18/22.  Medications   hydrOXYzine (ATARAX) tablet 50 mg (50 mg Oral Given 6/18/22 1153)        Assessments & Plan (with Medical Decision Making)   21-year-old female with a history of chronic suicidal thoughts and multiple ER visits for the same presents to us with a chief complaint of suicidal ideation at this time.  Previous records were reviewed.  Patient was given hydroxyzine here.  After some time this did help with her symptoms.  She is feeling much better now.  She denies any active suicidal thoughts.  We will discharge her back to the care of her group home.  Patient is comfortable discharge home and the plan.    I have reviewed the nursing notes. I have reviewed the findings, diagnosis, plan and need for follow up with the patient.    Discharge Medication List as of 6/18/2022  1:59 PM          Final diagnoses:   Suicidal thoughts     I, Eulalia Lowe, am serving as a trained medical scribe to document services personally performed by Richie Sinha DO, based on the provider's statements to me.   IRichie DO, was physically present and have reviewed and verified the accuracy of this note documented by Eulalia Lowe.    --  Richie CANO [facility] EMERGENCY DEPARTMENT  6/18/2022     Richie Sinha DO  06/18/22 5751

## 2022-06-18 NOTE — ED TRIAGE NOTES
Per EMS the Pt called 911 because she was going to see pictures of her dad today and the thought of that was going to cause her to choke herself.

## 2022-06-18 NOTE — ED NOTES
Pt stated feeling better and was cleared to be sent back to her . Nurse at the Pt  contacted and made aware that the Pt was coming back.  Pt left via EMS and left without any issue.

## 2022-06-22 ENCOUNTER — NURSE TRIAGE (OUTPATIENT)
Dept: NURSING | Facility: CLINIC | Age: 22
End: 2022-06-22

## 2022-06-23 ENCOUNTER — NURSE TRIAGE (OUTPATIENT)
Dept: NURSING | Facility: CLINIC | Age: 22
End: 2022-06-23

## 2022-06-23 NOTE — TELEPHONE ENCOUNTER
Nurse Triage SBAR    Is this a 2nd Level Triage? NO    Situation: Patient calling with Homicidal threats.  Consent: not needed    Chest pain--anxiety attack  Vomited x 3-- about 1-2 hours ago   Very sweaty  Tries to drink but vomits  States that she wants to hurt herself but does not have a plan in place  States that she has not tried to hurt herself recently  States having trouble with the staff at her group home--states that the staff is not attending to the group home residents; and not being present or watching the group home residents.  States that she wants to be violent towards the staff at the group home  States that she spoke with her psychiatrist around 1300 and was told to call back if she needs anything  Is not due for her hydroxyzine until 2100    Protocol Recommended Disposition:   Emergency department    Recommendation: Advised patient to Go to ED now . Reviewed concerning symptoms and when to call back.           Bonita P Maury, RN [facility] 6/22/2022 8:01 PM    Reason for Disposition    [1] Patient has harmed or is threatening harm to others AND [2] is willing to come in    Additional Information    Negative: Physical violence occurring now (e.g., hurting others or destroying property) (Exception: young child that can be stopped)    Negative: [1] Patient is threatening violence AND [2] has a deadly weapon (e.g., firearm, knife)    Negative: [1] Patient is threatening serious harm to others AND [2] is unwilling to come in    Negative: [1] Patient expresses homicidal thoughts AND [2] hangs up AND [3] triager unable to complete triage    Negative: Gunshot wound, knife wound or wound with any penetrating object    Negative: Family does not feel safe at home now    Negative: Sounds like a life-threatening emergency to the triager    Negative: Injuries needing medical treatment (e.g., suspected fractures, lacerations, human bites, head injuries)    Protocols used: HOMICIDAL THREATS OR  [id number]

## 2022-06-29 ENCOUNTER — NURSE TRIAGE (OUTPATIENT)
Dept: NURSING | Facility: CLINIC | Age: 22
End: 2022-06-29

## 2022-06-29 NOTE — TELEPHONE ENCOUNTER
"Triage Call:     Pt is calling to report that she is suicidal. She is reporting that she has thoughts of killing herself as recently as today and was going to attempts to do so by cutting herself.     Pt reports a history of cutting herself and choking herself; \"doing anything I can to hurt myself\"    Triggers reported today:   Pt reports that she is having trouble with housing  They are not doing their job; staffing issues   They \"don't care if I run away\"    Support:   Pt saw her psychiatrist today  Pt lives in a group home; but not a supportive environment    Patient reports she missed 4 doses recently of her medications  She has been refusing to take her medications  She reports that she is not capable of taking care of herself anymore    Pt reports she is planning on runny away    Due to reported current thoughts of suicide, writer called 911 for patient while patient waited on the line. Pt agreed with this plan. Pt agreed that she would not harm herself while she waited for the EMTs to arrive.    911 was called. The  reported that she would send help patient's way during the call.     Writer got patient back on the line and let her know that 911 was called. She was waiting outside for them to arrive. While waiting outside patient reported that one of the other people at the group was on the phone with an officer about the 911 call. Patient told the other person at the group home that writer called 911 for patient.  Patient reported that instead of going in to the ED for evaluation that she would \"just go to bed\". Writer asked to speak with the person at the group home, but patient declined this request. Pt hung up the phone.     Writer attempted to call patient back x4 with no answer. Phone would ring, but go to voicemail shortly after a couple rings.     Writer called 911 for the patient as noted above. Patient is no longer answering, so no further attempts to call patient back after the 4 attempts " were made due to writer already triaging and calling 911 for patient.     Elisabeth Wilcox RN  [facility] Nurse Advisor 3:28 PM 6/29/2022      Reason for Disposition    Patient is threatening suicide now    Additional Information    Negative: Patient attempted suicide    Negative: Patient is very confused (disoriented, slurred speech) and no other adult (e.g., friend or family member) available    Negative: Difficult to awaken or acting very confused (disoriented, slurred speech) and of new onset    Negative: Violent behavior, or threatening to physically hurt or kill someone    Protocols used: SUICIDE [id number]

## 2022-07-14 ENCOUNTER — NURSE TRIAGE (OUTPATIENT)
Dept: NURSING | Facility: CLINIC | Age: 22
End: 2022-07-14

## 2022-07-15 NOTE — TELEPHONE ENCOUNTER
Nurse Triage SBAR    Is this a 2nd Level Triage? NO    Situation: Patient calling with lump.  Consent: not needed    Yesterday, pt noticed a lump on her right side near her kidney.   Pt states that it is about the size of a quarter. States that the lump is red with a white spot in the middle. States that it spreading to her breast.   Pt states she has had something like this before on her inner thigh and has been given cream for it but is currently out of the cream.   When touching the lump, pain is 8/10. States that she is not able to sit or lay down because of the pain. Is currently laying on her left side so she is not putting any pressure on her right side.   Pt states that it has a burning sensation rather than an itching feeling.       Protocol Recommended Disposition:   See Physician Within 24 Hours    Recommendation: Advised patient to make an appt with her clinic or go to [facility] tomorrow.  . Reviewed concerning symptoms and when to call back.         Bonita Mendiola RN [facility] 7/14/2022 8:36 PM    Reason for Disposition    [1] Swelling is painful to touch AND [2] no fever    Additional Information    Negative: Sounds like a life-threatening emergency to the triager    Negative: Patient sounds very sick or weak to the triager    Negative: SEVERE pain (e.g., excruciating)    Negative: [1] Swelling is painful to touch AND [2] fever    Negative: [1] Swelling is red AND [2] fever    Negative: [1] Swelling is red AND [2] size > 2 inches (5.0 cm) (Exception: itchy area of skin)    Negative: [1] Swelling of groin (inguinal area) AND [2] painful    Protocols used: SKIN LUMP OR LOCALIZED SWELLING-A-AH

## 2022-07-26 ENCOUNTER — HOSPITAL ENCOUNTER (EMERGENCY)
Facility: CLINIC | Age: 22
Discharge: NURSING, REHAB OR LONG-TERM CARE | End: 2022-07-26
Attending: EMERGENCY MEDICINE | Admitting: EMERGENCY MEDICINE
Payer: MEDICARE

## 2022-07-26 VITALS
HEART RATE: 66 BPM | OXYGEN SATURATION: 99 % | TEMPERATURE: 98.6 F | SYSTOLIC BLOOD PRESSURE: 131 MMHG | DIASTOLIC BLOOD PRESSURE: 90 MMHG | RESPIRATION RATE: 16 BRPM

## 2022-07-26 DIAGNOSIS — R45.851 SUICIDAL IDEATION: ICD-10-CM

## 2022-07-26 DIAGNOSIS — F79 UNSPECIFIED INTELLECTUAL DISABILITIES: ICD-10-CM

## 2022-07-26 DIAGNOSIS — R11.2 NAUSEA WITH VOMITING: ICD-10-CM

## 2022-07-26 DIAGNOSIS — N39.0 URINARY TRACT INFECTION, SITE NOT SPECIFIED: ICD-10-CM

## 2022-07-26 DIAGNOSIS — F60.3 EXPLOSIVE PERSONALITY DISORDER (H): ICD-10-CM

## 2022-07-26 DIAGNOSIS — F31.9 BIPOLAR DISORDER, UNSPECIFIED (H): ICD-10-CM

## 2022-07-26 PROCEDURE — 250N000011 HC RX IP 250 OP 636

## 2022-07-26 PROCEDURE — 99285 EMERGENCY DEPT VISIT HI MDM: CPT | Performed by: EMERGENCY MEDICINE

## 2022-07-26 PROCEDURE — 99285 EMERGENCY DEPT VISIT HI MDM: CPT | Mod: 25

## 2022-07-26 PROCEDURE — 250N000013 HC RX MED GY IP 250 OP 250 PS 637: Performed by: EMERGENCY MEDICINE

## 2022-07-26 RX ORDER — OLANZAPINE 10 MG/2ML
10 INJECTION, POWDER, FOR SOLUTION INTRAMUSCULAR ONCE
Status: COMPLETED | OUTPATIENT
Start: 2022-07-26 | End: 2022-07-26

## 2022-07-26 RX ORDER — OLANZAPINE 10 MG/2ML
INJECTION, POWDER, FOR SOLUTION INTRAMUSCULAR
Status: COMPLETED
Start: 2022-07-26 | End: 2022-07-26

## 2022-07-26 RX ORDER — METOCLOPRAMIDE 5 MG/1
10 TABLET ORAL 2 TIMES DAILY PRN
Status: DISCONTINUED | OUTPATIENT
Start: 2022-07-26 | End: 2022-07-26 | Stop reason: HOSPADM

## 2022-07-26 RX ORDER — OLANZAPINE 5 MG/1
5 TABLET ORAL DAILY PRN
Status: DISCONTINUED | OUTPATIENT
Start: 2022-07-26 | End: 2022-07-26 | Stop reason: HOSPADM

## 2022-07-26 RX ORDER — HYDROXYZINE HYDROCHLORIDE 50 MG/1
50 TABLET, FILM COATED ORAL 2 TIMES DAILY
Status: DISCONTINUED | OUTPATIENT
Start: 2022-07-26 | End: 2022-07-26 | Stop reason: HOSPADM

## 2022-07-26 RX ADMIN — OLANZAPINE 5 MG: 5 TABLET, FILM COATED ORAL at 12:57

## 2022-07-26 RX ADMIN — OLANZAPINE 10 MG: 10 INJECTION, POWDER, LYOPHILIZED, FOR SOLUTION INTRAMUSCULAR at 14:19

## 2022-07-26 RX ADMIN — METOCLOPRAMIDE 10 MG: 5 TABLET ORAL at 12:57

## 2022-07-26 RX ADMIN — OLANZAPINE 10 MG: 10 INJECTION, POWDER, FOR SOLUTION INTRAMUSCULAR at 14:19

## 2022-07-26 ASSESSMENT — ENCOUNTER SYMPTOMS
VOMITING: 1
ABDOMINAL PAIN: 0
NAUSEA: 1
SHORTNESS OF BREATH: 0
FEVER: 0

## 2022-07-26 NOTE — ED PROVIDER NOTES
"    [facility] EMERGENCY DEPARTMENT ([facility])    7/26/22     ED 16C   History     Chief Complaint   Patient presents with     Suicidal     Having suicidal thoughts of slashing her throat, recent SIB by biting finger, bite bianca to left index finger that is not open     Abdominal Pain     Reports generalized sharp, stabbing pain     HPI  Sadaf Ross is a 21 year old female with history of MDD, borderline personality disorder, SI, intellectual disability, ADHD, self-injurious behavior, psychosis who resides at group home who presents to the Emergency Department with multiple complaints. As for physical symptoms she abdominal pain, nausea and vomiting. She states the nausea and vomiting started this morning. Has been vomiting.  No diarrhea or constipation.  She was seen for UTI yesterday in the emergency department, was ordered Keflex 500 mg 3 times daily for 7-day course.  She reports having suicidal ideation. She states \"I'm going to punch you in the face.\"  She states that she is on hydralazine or Ativan for agitation.    Past Medical History  Past Medical History:   Diagnosis Date     ADHD (attention deficit hyperactivity disorder)      Bipolar 1 disorder (H)      Borderline personality disorder (H)      Depression      Depressive disorder      Intellectual disability      Obesity      Syncope      Past Surgical History:   Procedure Laterality Date     APPENDECTOMY       APPENDECTOMY       ARIPiprazole ER (ABILIFY MAINTENA) 400 MG extended release inj syringe  benzonatate (TESSALON) 100 MG capsule  benztropine (COGENTIN) 0.5 MG tablet  calcium carbonate (TUMS) 500 MG chewable tablet  chlorhexidine (PERIDEX) 0.12 % solution  docusate sodium (COLACE) 100 MG capsule  hydrOXYzine (ATARAX) 50 MG tablet  metoclopramide (REGLAN) 10 MG tablet  norgestimate-ethinyl estradiol (ORTHO-CYCLEN) 0.25-35 MG-MCG tablet  OLANZapine (ZYPREXA) 2.5 MG tablet  OLANZapine zydis (ZYPREXA) 5 MG ODT  omeprazole (PRILOSEC) 40 MG " DR capsule  ondansetron (ZOFRAN) 4 MG tablet  polyethylene glycol (MIRALAX) 17 g packet  prochlorperazine (COMPAZINE) 10 MG tablet  promethazine (PHENERGAN) 25 MG suppository  venlafaxine (EFFEXOR-XR) 150 MG 24 hr capsule  Vitamin D, Cholecalciferol, 25 MCG (1000 UT) TABS      Allergies   Allergen Reactions     Penicillins Rash and Unknown     Family History  Family History   Problem Relation Age of Onset     Diabetes Type 1 Father      Cancer Paternal Grandfather      Social History   Social History     Tobacco Use     Smoking status: Current Every Day Smoker     Packs/day: 0.50     Years: 5.00     Pack years: 2.50     Types: Vaping Device     Smokeless tobacco: Never Used   Substance Use Topics     Alcohol use: No     Drug use: No      Past medical history, past surgical history, medications, allergies, family history, and social history were reviewed with the patient. No additional pertinent items.       Review of Systems   Constitutional: Negative for fever.   Respiratory: Negative for shortness of breath.    Cardiovascular: Negative for chest pain.   Gastrointestinal: Positive for nausea and vomiting. Negative for abdominal pain.   All other systems reviewed and are negative.    A complete review of systems was performed with pertinent positives and negatives noted in the HPI, and all other systems negative.    Physical Exam   BP: 111/72  Pulse: 83  Temp: 97.9  F (36.6  C)  Resp: 16  SpO2: 99 %  Physical Exam  Constitutional:       General: She is not in acute distress.     Appearance: She is not diaphoretic.   HENT:      Head: Normocephalic and atraumatic.      Right Ear: External ear normal.      Left Ear: External ear normal.      Nose: Nose normal.   Eyes:      Extraocular Movements: Extraocular movements intact.      Conjunctiva/sclera: Conjunctivae normal.   Cardiovascular:      Rate and Rhythm: Normal rate and regular rhythm.      Heart sounds: Normal heart sounds.   Pulmonary:      Effort: Pulmonary  effort is normal. No respiratory distress.      Breath sounds: Normal breath sounds.   Abdominal:      General: There is no distension.      Palpations: Abdomen is soft.      Tenderness: There is no abdominal tenderness.   Musculoskeletal:         General: No swelling or deformity.      Cervical back: Normal range of motion and neck supple.   Skin:     General: Skin is warm and dry.   Neurological:      Mental Status: Mental status is at baseline.      Comments: Alert, oriented   Psychiatric:         Mood and Affect: Mood normal.         Behavior: Behavior normal.         ED Course      Procedures         Critical Care Addendum    My initial assessment, based on my focused history and physical exam, established that Sadaf Ross has severe agitation, which requires immediate intervention, and therefore she is critically ill.     After the initial assessment, the care team initiated physical restraints and Gave Zyprexa to provide stabilization care to provide stabilization care. Due to the critical nature of this patient, I reassessed nursing observations, physical exam and mental status multiple times prior to her disposition.     Time also spent performing documentation.     Critical care time (excluding teaching time and procedures): 45 minutes.      No results found for any visits on 07/26/22.  Medications   hydrOXYzine (ATARAX) tablet 50 mg (has no administration in time range)   metoclopramide (REGLAN) tablet 10 mg (10 mg Oral Given 7/26/22 1257)   OLANZapine (zyPREXA) tablet 5 mg (5 mg Oral Given 7/26/22 1257)        Assessments & Plan (with Medical Decision Making)   21-year-old female presents to us with a chief complaint of intermittent nausea vomiting abdominal pain as well as suicidal ideation.  Vital signs today are unremarkable.  Patient has a degree of chronic suicidal ideation and is unchanged from her baseline.  She does live in a group home and is generally able to be Safe there.  She does have  history of intermittent abdominal pain in the past she states.  After medications for nausea as well as work-up but she declined at this time. ,  Recommend she take her home Reglan as needed for nausea.  She will follow-up with primary care and return to us as needed.  She was recently started on medications for UTI and her current nausea may be related to this.  Patient became agitated at the point where she was told she be discharged.  We did have to call code 21 on her and she was briefly placed in five-point restraints and given Zyprexa.  She was subsequently much [place] and was discharged back to her group home.  I have reviewed the nursing notes. I have reviewed the findings, diagnosis, plan and need for follow up with the patient.    New Prescriptions    No medications on file       Final diagnoses:   Suicidal ideation     I, Larisa Grant, am serving as a trained medical scribe to document services personally performed by Richie Sinha DO based on the provider's statements to me on July 26, 2022.  This document has been checked and approved by the attending provider.    I, Richie Sinha DO, was physically present and have reviewed and verified the accuracy of this note documented by Larisa Grant, medical scribe.      Richie Sinha DO     [facility] EMERGENCY DEPARTMENT  7/26/2022     Richie Sinha,   07/26/22 1336       Richie Sinha,   07/26/22 1336       Richie Sinha,   07/26/22 1600

## 2022-07-26 NOTE — ED NOTES
Pt refused to voluntarily allow an IM of zyprexa. She refused to be safe and no longer hit herself or the walls. Code 21 team restrained her. Pt was given the zyprexa. She was offered the option of NOT be restrained if she could stay safe. The pt refused. She was placed in 5 point restraints

## 2022-07-26 NOTE — ED TRIAGE NOTES
Triage Assessment     Row Name 07/26/22 1214       Triage Assessment (Adult)    Airway WDL WDL       Respiratory WDL    Respiratory WDL WDL       Cognitive/Neuro/Behavioral WDL    Cognitive/Neuro/Behavioral WDL WDL

## 2022-07-26 NOTE — ED NOTES
Arlene from the [facility] was notified that the pt is returning. She states that the pt should return by medical transport

## 2022-07-26 NOTE — ED NOTES
Pt is aware that she is being discharged. She became angry and upset. She hit herself and the torres. She refused to agree to stop. Code 21 called

## 2022-07-26 NOTE — ED TRIAGE NOTES
Conner Montes, was at a clinic for a routine appointment, made comment of wanting to hurt self when talking about going back to group home, cooperative with EMS

## 2022-07-26 NOTE — ED NOTES
Within an hour after restraint an in person face to face assessment was completed at 300, including an evaluation of the patient's immediate reaction to the intervention, behavioral assessment and review/assessment of history, drugs and medications, recent labs, etc., and behavioral condition.  The patient experienced: No adverse physical outcome from seclusion/restraint initiation.  The intervention of restraint or seclusion needs to terminate.       Richie Sinha,   07/26/22 1600

## 2022-07-27 ENCOUNTER — NURSE TRIAGE (OUTPATIENT)
Dept: NURSING | Facility: CLINIC | Age: 22
End: 2022-07-27

## 2022-07-27 ENCOUNTER — HOSPITAL ENCOUNTER (EMERGENCY)
Facility: CLINIC | Age: 22
Discharge: HOME | End: 2022-07-27
Attending: FAMILY MEDICINE | Admitting: FAMILY MEDICINE
Payer: MEDICARE

## 2022-07-27 VITALS
OXYGEN SATURATION: 97 % | RESPIRATION RATE: 16 BRPM | BODY MASS INDEX: 40.34 KG/M2 | SYSTOLIC BLOOD PRESSURE: 107 MMHG | HEART RATE: 80 BPM | TEMPERATURE: 98.3 F | DIASTOLIC BLOOD PRESSURE: 57 MMHG | WEIGHT: 235 LBS

## 2022-07-27 DIAGNOSIS — F60.3 BORDERLINE PERSONALITY DISORDER (H): ICD-10-CM

## 2022-07-27 DIAGNOSIS — R45.851 SUICIDAL THOUGHTS: ICD-10-CM

## 2022-07-27 PROCEDURE — 250N000013 HC RX MED GY IP 250 OP 250 PS 637: Performed by: FAMILY MEDICINE

## 2022-07-27 PROCEDURE — 99285 EMERGENCY DEPT VISIT HI MDM: CPT | Mod: 25 | Performed by: FAMILY MEDICINE

## 2022-07-27 PROCEDURE — 250N000011 HC RX IP 250 OP 636: Performed by: FAMILY MEDICINE

## 2022-07-27 PROCEDURE — 90791 PSYCH DIAGNOSTIC EVALUATION: CPT

## 2022-07-27 PROCEDURE — 96372 THER/PROPH/DIAG INJ SC/IM: CPT | Performed by: FAMILY MEDICINE

## 2022-07-27 PROCEDURE — 99291 CRITICAL CARE FIRST HOUR: CPT | Performed by: FAMILY MEDICINE

## 2022-07-27 RX ORDER — OLANZAPINE 10 MG/1
10 TABLET, ORALLY DISINTEGRATING ORAL
Status: DISCONTINUED | OUTPATIENT
Start: 2022-07-27 | End: 2022-07-27 | Stop reason: HOSPADM

## 2022-07-27 RX ORDER — HYDROXYZINE HYDROCHLORIDE 25 MG/1
25 TABLET, FILM COATED ORAL ONCE
Status: COMPLETED | OUTPATIENT
Start: 2022-07-27 | End: 2022-07-27

## 2022-07-27 RX ORDER — HYDROXYZINE HYDROCHLORIDE 25 MG/1
25 TABLET, FILM COATED ORAL
Status: DISCONTINUED | OUTPATIENT
Start: 2022-07-27 | End: 2022-07-27 | Stop reason: HOSPADM

## 2022-07-27 RX ORDER — OLANZAPINE 10 MG/1
10 TABLET, ORALLY DISINTEGRATING ORAL ONCE
Status: COMPLETED | OUTPATIENT
Start: 2022-07-27 | End: 2022-07-27

## 2022-07-27 RX ORDER — LORAZEPAM 2 MG/ML
1 INJECTION INTRAMUSCULAR ONCE
Status: COMPLETED | OUTPATIENT
Start: 2022-07-27 | End: 2022-07-27

## 2022-07-27 RX ORDER — OLANZAPINE 10 MG/2ML
5 INJECTION, POWDER, FOR SOLUTION INTRAMUSCULAR ONCE
Status: COMPLETED | OUTPATIENT
Start: 2022-07-27 | End: 2022-07-27

## 2022-07-27 RX ADMIN — OLANZAPINE 5 MG: 10 INJECTION, POWDER, FOR SOLUTION INTRAMUSCULAR at 15:55

## 2022-07-27 RX ADMIN — HYDROXYZINE HYDROCHLORIDE 25 MG: 25 TABLET ORAL at 10:09

## 2022-07-27 RX ADMIN — LORAZEPAM 1 MG: 2 INJECTION INTRAMUSCULAR; INTRAVENOUS at 15:55

## 2022-07-27 RX ADMIN — OLANZAPINE 10 MG: 10 TABLET, ORALLY DISINTEGRATING ORAL at 10:09

## 2022-07-27 NOTE — ED NOTES
Patient yelling and asking to be released from restraints.  Went over discontinuing criteria with patient, and she stated she was able to contract for safety, including not hurting herself or others and not throwing items and damaging property.  She is not a danger to self and others at this time.  Restraints were discontinued.  She did not sustain any injuries.

## 2022-07-27 NOTE — TELEPHONE ENCOUNTER
"Patient calling because she fainted this morning.    Pt says that when she woke up around 7:20 she stood up our of bed and passed out. She says that now, she feels cold, dizzy and like her heart is racing. She also states that she is \"becoming violent\" like she wants to \"punch things.\" Asked pt to find a place to sit or lie down, as she reported she was outside standing up in that moment.    She is currently living in a group home. The two caregivers are there, but she says one is in the shower and the other one \"won't talk to anybody\" when writer asked to speak with someone who was with her. She then stated she was having pain in her chest.    Protocol recommends to call 911. Patient stated she did not feel comfortable calling 911 on her own, so writer placed her on a brief hold to call the . Reconnected to patient and asked that EMS be sent out to the group home. FNA disconnected call once  took over.    Kristina Anderson, RN, BSN  [facility]   Triage Nurse Advisor              Reason for Disposition    Still feels dizzy or lightheaded    Additional Information    Negative: Still unconscious    Protocols used: [id number]      "

## 2022-07-27 NOTE — ED NOTES
Upon entering into clients room, client is laying in bed softly bumping forehead on the beds siderail. KHANH Desai using proper BCS and therapeutic communication to get the client to disengage from self harm behavior. No redness, swelling, or pain to the forehead area. Client denies pain, declines food or fluids at this time.

## 2022-07-27 NOTE — ED NOTES
Within an hour after restraint an in person face to face assessment was completed at 1600, including an evaluation of the patient's immediate reaction to the intervention, behavioral assessment and review/assessment of history, drugs and medications, recent labs, etc., and behavioral condition.  The patient experienced: No adverse physical outcome from seclusion/restraint initiation.  The intervention of restraint or seclusion needs to continue.     Leo Lowe MD  07/27/22 1600

## 2022-07-27 NOTE — ED NOTES
Rounded on patient and obtained vital signs.  Informed patient of plan to discharge back to her group home.  Patient immediately became upset, got up from bed, started pacing and yelling.  Soon after she started to punch walls and hit objects in her room.  She threw her water and other objects in room on the floor and threatened to hurt staff.  She was given reassurance and attempted to de-escalate without success.  She was offered oral medications, which she declined.  Spoke to MD who recommended IM medications.  As writer was obtaining meds, patient started head banging.  Code 21 was called, and staff went hands on to control patient.  Patient was placed in 5-point restraints as she was in imminent danger of causing harm to herself and others.  IM medications were administered per ordered.  1:1 continued for safety.  Patient did not sustain any injuries.

## 2022-07-27 NOTE — ED NOTES
Report called to group home staff, Arlene, at [phone].  Group home anticipating patient's return.  Patient discharged back to group [place] with [facility].

## 2022-07-27 NOTE — ED PROVIDER NOTES
[facility] EMERGENCY DEPARTMENT ([facility])     July 27, 2022     History     Chief Complaint   Patient presents with     Homicidal     PER EMS. Client having thoughts of self harm towards self and others     Suicidal     HPI  Sadaf Ross is a 21 year old female with a past medical history including MDD, borderline personality disorder, bipolar I disorder, chronic SI, intellectual disability, ADHD, self-injurious behavior, psychosis who presents to the Emergency Department for evaluation of homicidal and suicidal ideation.  Per EMS, patient is coming from home.  She has been having violent thoughts towards others and herself, was hitting herself on the forehead while in route.  In the ED today, per mental health  patient endorses HI and SI recently, stating that she has been hearing voices telling her to hurt herself and others. Denies feeling like she will hurt anyone specific and denies plan. Patient was here last night with similar complaints, was sent back to her group home agitated. She is a somewhat poor historian and does not elaborate on the group home. Group home states that they are staffed all the time.    Patient does have a history of frequently coming to the ED, has been doing better for the last 2 weeks however group home states that recently she has been acting like she is fainting. Patient makes threats, slams doors but has never been violent at the group home or hurt herself. She does note that she is noncompliant with her medications and did not take her morning meds today. She has a  who works with her on when to go to the clinic or ED, and when to seek care.      Past Medical History  Past Medical History:   Diagnosis Date     ADHD (attention deficit hyperactivity disorder)      Bipolar 1 disorder (H)      Borderline personality disorder (H)      Depression      Depressive disorder      Intellectual disability      Obesity      Syncope      Past Surgical  History:   Procedure Laterality Date     APPENDECTOMY       APPENDECTOMY       ARIPiprazole ER (ABILIFY MAINTENA) 400 MG extended release inj syringe  benzonatate (TESSALON) 100 MG capsule  benztropine (COGENTIN) 0.5 MG tablet  calcium carbonate (TUMS) 500 MG chewable tablet  chlorhexidine (PERIDEX) 0.12 % solution  docusate sodium (COLACE) 100 MG capsule  hydrOXYzine (ATARAX) 50 MG tablet  metoclopramide (REGLAN) 10 MG tablet  norgestimate-ethinyl estradiol (ORTHO-CYCLEN) 0.25-35 MG-MCG tablet  OLANZapine (ZYPREXA) 2.5 MG tablet  OLANZapine zydis (ZYPREXA) 5 MG ODT  omeprazole (PRILOSEC) 40 MG DR capsule  ondansetron (ZOFRAN) 4 MG tablet  polyethylene glycol (MIRALAX) 17 g packet  prochlorperazine (COMPAZINE) 10 MG tablet  promethazine (PHENERGAN) 25 MG suppository  venlafaxine (EFFEXOR-XR) 150 MG 24 hr capsule  Vitamin D, Cholecalciferol, 25 MCG (1000 UT) TABS      Allergies   Allergen Reactions     Penicillins Rash and Unknown     Family History  Family History   Problem Relation Age of Onset     Diabetes Type 1 Father      Cancer Paternal Grandfather      Social History   Social History     Tobacco Use     Smoking status: Current Every Day Smoker     Packs/day: 0.50     Years: 5.00     Pack years: 2.50     Types: Vaping Device     Smokeless tobacco: Never Used   Substance Use Topics     Alcohol use: No     Drug use: No      Past medical history, past surgical history, medications, allergies, family history, and social history were reviewed with the patient. No additional pertinent items.       Review of Systems  A complete review of systems was performed with pertinent positives and negatives noted in the HPI, and all other systems negative.    Physical Exam   BP: 119/70  Pulse: 92  Temp: 98.3  F (36.8  C)  Resp: 18  Weight: 106.6 kg (235 lb)  SpO2: 99 %  Physical Exam  Vitals and nursing note reviewed.   Constitutional:       General: She is not in acute distress.     Appearance: She is not diaphoretic.   HENT:  "     Head: Atraumatic.      Mouth/Throat:      Pharynx: No oropharyngeal exudate.   Eyes:      General: No scleral icterus.     Pupils: Pupils are equal, round, and reactive to light.   Cardiovascular:      Heart sounds: Normal heart sounds.   Pulmonary:      Effort: No respiratory distress.      Breath sounds: Normal breath sounds.   Abdominal:      General: Bowel sounds are normal.      Palpations: Abdomen is soft.      Tenderness: There is no abdominal tenderness.   Musculoskeletal:         General: No tenderness.   Skin:     General: Skin is warm.      Findings: No rash.   Neurological:      General: No focal deficit present.      Mental Status: She is oriented to person, place, and time.   Psychiatric:         Attention and Perception: Attention normal.         Mood and Affect: Mood is anxious. Affect is flat.         Speech: Speech normal.         Behavior: Behavior is withdrawn.         Thought Content: Thought content includes suicidal (Patient states she \"always has suicidal thoughts\".) ideation. Thought content does not include suicidal plan.         Cognition and Memory: Cognition is impaired (Baseline intellectual disability).         ED Course      Procedures       Critical Care Addendum    My initial assessment, based on my review of prehospital provider report, review of nursing observations, review of vital signs, focused history and physical exam, established that Sadaf Ross has severe agitation, which requires immediate intervention, and therefore she is critically ill.     After the initial assessment, the care team initiated medication therapy with Zyprexa and Ativan and initiated physical restraints and Seclusion to provide stabilization care to provide stabilization care. Due to the critical nature of this patient, I reassessed nursing observations, vital signs, physical exam, mental status and respiratory status multiple times prior to her disposition.     Time also spent performing " documentation and coordination of care.     Critical care time (excluding teaching time and procedures): 30 minutes.   The medical record was reviewed and interpreted.  Previous labs reviewed and interpreted.    Suicide assessment completed by mental health (D.EKarinaC., LCSW, etc.)       No results found for any visits on 07/27/22.  Medications   OLANZapine zydis (zyPREXA) ODT tab 10 mg (10 mg Oral Not Given 7/27/22 1524)   hydrOXYzine (ATARAX) tablet 25 mg (25 mg Oral Not Given 7/27/22 1524)   OLANZapine zydis (zyPREXA) ODT tab 10 mg (10 mg Oral Given 7/27/22 1009)   hydrOXYzine (ATARAX) tablet 25 mg (25 mg Oral Given 7/27/22 1009)   OLANZapine (zyPREXA) injection 5 mg (5 mg Intramuscular Given 7/27/22 1555)   LORazepam (ATIVAN) injection 1 mg (1 mg Intramuscular Given 7/27/22 1555)        Assessments & Plan (with Medical Decision Making)   A 21-year-old woman with multiple previous psychiatric diagnoses including borderline personality disorder, depression, intellectual disability, aggressive behavior and chronic suicidal thoughts.  Presenting again to the ED after she told the group home staff that she wanted to die and was banging her head on the wall.  She was given Zyprexa and hydroxyzine here and seems to have calmed down some.  Reportedly has been declining her medications at the group home.  The patient was also seen by the [facility] , please refer to their extensive note/evaluation which was reviewed with me and is documented in EPIC on 7/27/2022 for further details.  The patient frequently becomes emotionally dysregulated, has low frustration tolerance, frequently complains of somatic symptoms and per previous report prefers to be at the emergency room or the hospital within her group home.  Today her presenting complaint is very similar to multiple, multiple prior presentations.  She initially calm down, reported that she was feeling more safe and ready to go home.  Approximately 10 minutes prior to the  "arrival of the paramedics to take her home, she again stated that she wanted to stay at the hospital and when advised that she would not be admitted today started screaming and hitting the wall.  A code 21 was called, she was restrained and given Zyprexa and Ativan.  20 minutes later she was taken out of restraints and was talking calmly on the phone, however the EMS service declined to transport her because they are only a BLS ambulance.  We will plan to discharge the patient back to her group home.  Patient is now cooperative, more emotionally regulated, and appears to be back at baseline.  The patient does not appear to be an acute imminent risk to themself or others. Patient does have a higher than average risk for similar behaviors due to their past behaviors and diagnoses. This episode is unfortunately part of the baseline for this patient.  A short acute hospitalization will not likely mitigate the long term risk, and is not recommended, nor a beneficial treatment for these behaviors.  The patient in fact told me that though she was feeling better \"I will probably be back tomorrow\".  Patient tends to seek hospitalization and emergency department visits for secondary gain and would recommend against admission unless absolutely necessary for immediate safety.  This is consistent with multiple prior visits.  Patient is being signed out to the evening physician with a plan to discharge later once transportation is available.  I have reviewed the nursing notes. I have reviewed the findings, diagnosis, plan and need for follow up with the patient.    New Prescriptions    No medications on file       Final diagnoses:   Borderline personality disorder (H)   Suicidal thoughts     IEulalia, am serving as a trained medical scribe to document services personally performed by Leo Lowe MD, based on the provider's statements to me.   ILeo MD, was physically present and have reviewed and verified " the accuracy of this note documented by Eulalia Lowe.    --  Leo Lowe MD  [facility] EMERGENCY DEPARTMENT  7/27/2022     Leo Lowe MD  07/27/22 0010

## 2022-07-27 NOTE — ED TRIAGE NOTES
Triage Assessment     Row Name 07/27/22 0839       Triage Assessment (Adult)    Airway WDL WDL       Respiratory WDL    Respiratory WDL WDL       Cardiac WDL    Cardiac WDL WDL       Peripheral/Neurovascular WDL    Peripheral Neurovascular WDL WDL       Cognitive/Neuro/Behavioral WDL    Cognitive/Neuro/Behavioral WDL WDL       [place] Coma Scale    Best Eye Response 4-->(E4) spontaneous    Best Motor Response 6-->(M6) obeys commands    Best Verbal Response 5-->(V5) oriented    [place] Coma Scale Score 15

## 2022-07-27 NOTE — ED TRIAGE NOTES
PER [facility] [address] -     Coming from home. Having violent thoughts towards others and herself. Client to hitting herself on forehead while in route. EMS restrained client with with soft restraint x1 to right wrist.     V/S WDL

## 2022-07-27 NOTE — CONSULTS
Diagnostic Evaluation Consultation  Crisis Assessment    Patient was assessed: in person  Patient location: [facility] ED  Was a release of information signed: No. Reason: pt declined      Referral Data and Chief Complaint  Sadaf is a 21 year old, who uses she/her pronouns, and presents to the ED via EMS. Patient is referred to the ED by self. Patient is presenting to the ED for the following concerns: behavioral/emotional concerns.      Informed Consent and Assessment Methods     Patient is her own guardian. Writer met with patient and explained the crisis assessment process, including applicable information disclosures and limits to confidentiality, assessed understanding of the process, and obtained consent to proceed with the assessment. Patient was observed to be able to participate in the assessment as evidenced by oriented, coherent. Assessment methods included conducting a formal interview with patient, review of medical records, collaboration with medical staff, and obtaining relevant collateral information from family and community providers when available..     Over the course of this crisis assessment provided reassurance, offered validation, engaged patient in problem solving and disposition planning and worked with patient on safety and aftercare planning. Patient's response to interventions was minimal. Focused on staying in the hospital.     Summary of Patient Situation  Pt presented to ED via EMS from her group home. She was seen less than 24 hours ago in the ED and discharged back to group home. Today she is reporting homicidal and suicidal thoughts. Pt reports that she is here today because she 'woke up and I fainted.' She states 'I wanna be violent. I want to hurt other people and myself.' She denies specific plan/intent. She denies any hx violence and denies hx suicide attempts. She reports that she is hearing voices to kill herself and other people. She does not present with signs of acute  psychosis. She cannot identify any specific stressors but reports that she wants to be with her dad who  in the last year.     Brief Psychosocial History  Pt resides at [facility] with 24/7 staffing. She is her own guardian. She receives SSDI.     Significant Clinical History  Chart review shows history of multiple MH diagnoses including but not limited to RAMON, depression, Borderline Personality Disorder, intellectual disability. She is prescribed medications which are administered by [facility] staff but reports that she has been refusing them this past week. Denies any drug/alcohol history. She has hx multiple admissions in the past most recent 2022. Pt has hx of repeated ED visits; today is her 2nd visit in 24 hours. Pt has an integrated  through [facility] that she meets with regularly, most recently yesterday.      Collateral Information  Talked with RN from [facility] [phone] who reports that pt has been exhibiting signs of fainting this week at home but that this is often a behavior that she uses in order to be sent to the hospital. She has been refusing her medications. RN reports that pt makes threats, slams doors, but has never acted out aggressively with others. She has not attempted to harm herself. She goes through periods where she engages in these attention seeking behaviors more often and then will stop for periods of time. [facility] is fine with pt returning home.      Risk Assessment  ESS-6  1.a. Over the past 2 weeks, have you had thoughts of killing yourself? Yes  1.b. Have you ever attempted to kill yourself and, if yes, when did this last happen? No   2. Recent or current suicide plan? No   3. Recent or current intent to act on ideation? No  4. Lifetime psychiatric hospitalization? Yes  5. Pattern of excessive substance use? No  6. Current irritability, agitation, or aggression? Yes  Scoring note: BOTH 1a and 1b must be yes for it to score 1 point,  if both are not yes it is zero. All others are 1 point per number. If all questions 1a/1b - 6 are no, risk is negligible. If one of 1a/1b is yes, then risk is mild. If either question 2 or 3, but not both, is yes, then risk is automatically moderate regardless of total score. If both 2 and 3 are yes, risk is automatically high regardless of total score.      Score: 2, mild risk      Does the patient have access to lethal means? No     Does the patient engage in non-suicidal self-injurious behavior (NSSI/SIB)? Will hit her head on things     Does the patient have thoughts of harming others? Yes.  Does the patient have a specific victim in mind? No Do they have a plan? No Do they have intent? No Is this a duty to warn situation?  no     Is the patient engaging in sexually inappropriate behavior?  no        Current Substance Abuse  Is there recent substance abuse? no  Was a urine drug screen or blood alcohol level obtained: No       Mental Status Exam   Affect: Flat   Appearance: Appropriate    Attention Span/Concentration: Inattentive  Eye Contact: Avoidant   Fund of Knowledge: Delayed    Language /Speech Content: Fluent   Language /Speech Volume: Soft    Language /Speech Rate/Productions: Slow  Recent Memory: Variable   Remote Memory: Variable   Mood: Anxious and Depressed    Orientation to Person: Yes    Orientation to Place: Yes   Orientation to Time of Day: Yes    Orientation to Date: Yes    Situation (Do they understand why they are here?): Yes    Psychomotor Behavior: Normal    Thought Content: Clear   Thought Form: Intact      History of commitment: No    Medication  Psychotropic medications: Yes, see chart  Medication changes made in the last two weeks: No    Current Care Team  Primary Care Provider: Jess Wilkins MD, [facility]  Psychiatrist: Emma Jacobson  Therapist: No  : [facility], [phone]     CTSS or ARMHS: No  ACT Team: No  Other: No      Diagnosis  300.02 (F41.1) Generalized  Anxiety Disorder   Intellectual Disabilites  319 (F79) Unspecified Intellectual Disability (Intellectual Developmental Disorder) - by history       Clinical Summary and Substantiation of Recommendations    Pt with hx intellectual disabilities who presents regularly to the ED from her group home with physical complaints as well as thoughts of harm to herself and others. Due to disabilities, she is not able to provide a good history. She resides in a group home with 24/7 staff and has an established team of psychiatry, primary care and case management in the community. Thoughts of harm to herself and others is present at baseline. No hx of acting on thoughts per chart and per group home. Pt is best served in the community at this time due to chronic nature of symptoms and limitations due to intellectual disability.     Disposition  Recommended disposition: Group Home: return to group home with established MH support services       Reviewed case and recommendations with attending provider. Attending Name: Leo Lowe MD       Attending concurs with disposition: Yes       Patient concurs with disposition: Yes       Guardian concurs with disposition: NA      Final disposition: Group home: return to group home and will follow up with established providers .     Outpatient Details (if applicable):   Aftercare plan and appointments placed in the AVS and provided to patient: Yes; given by ED staff    Was lethal means counseling provided as a part of aftercare planning? No; group home reports that pt does not have access to lethal means      Assessment Details  Patient interview started at: 850 am and completed at: 910 am.     Total duration spent on the patient case in minutes: .50 hrs      CPT code(s) utilized: 04120 - Psychotherapy for Crisis - 60 (30-74*) min       YOSHI Saucedo  DEC - Triage & Transition Services      Aftercare Plan  If I am feeling unsafe or I am in a crisis, I will:   Contact my established  "care providers   Call the National Suicide Prevention Lifeline: 988  Go to the nearest emergency room   Call 911     Warning signs that I or other people might notice when a crisis is developing for me:   Increased panic attacks  Increased thoughts of harm to myself or others    Things I am able to do on my own or with others to cope or help me feel better:   Listen to music   Talk with staff    Things I can use or do for distraction:   Ask staff to play game/take walk  Watch a funny show/movie     Changes I can make to support my mental health and wellness:   Take your medications as prescribed  Get enough sleep and eat and drink regularly  Get outside for fresh air each day!    People in my life that I can ask for help:  Staff   German  My mom    Your [facility] has a mental health crisis team you can call 24/7: [facility] Mobile Crisis  [phone] (adults)  [phone] (children)     Additional resources and information:         Crisis Lines  Crisis Text Line  Text 110587  You will be connected with a trained live crisis counselor to provide support.    Por espanol, texto  SIRENA a 445980 o texto a 442-AYUDAME en WhatsApp    The [employer] (LGBTQ Youth Crisis Line)  6.692.820.6326  text START to 357-617      Community Resources  Fast Tracker  Linking people to mental health and substance use disorder resources  [employer]org     [place] Mental Health Warm Line  Peer to peer support  Monday thru Saturday, 12 pm to 10 pm  916.913.8131 or 8.688.766.7769  Text \"Support\" to 81257    National [place] on Mental Illness (MAGY)  867.234.1810 or 1.888.MAGY.HELPS      Mental Health Apps  My3  https://my3app.org/    VirtualHopeBox  https://[employer].org/apps/virtual-hope-box/      Additional Information  Today you were seen by a licensed mental health professional through Triage and Transition services, [facility] ([facility])  for a crisis assessment in the Emergency " Department at [facility].  It is recommended that you follow up with your established providers (psychiatrist, mental health therapist, and/or primary care doctor - as relevant) as soon as possible. Coordinators from [facility] will be calling you in the next 24-48 hours to ensure that you have the resources you need.  You can also contact [facility] coordinators directly at [phone]. You may have been scheduled for or offered an appointment with a mental health provider. [facility] maintains an extensive network of licensed behavioral health providers to connect patients with the services they need.  We do not charge providers a fee to participate in our referral network.  We match patients with providers based on a patient's specific needs, insurance coverage, and location.  Our first effort will be to refer you to a provider within your care system, and will utilize providers outside your care system as needed.

## 2022-07-27 NOTE — ED NOTES
"When writer walked to pt's room to do search and vitals, pt was hunched over with her head against the side rail of the bed. Pt started hitting her forehead lightly against the rail when writer opened the door. Writer asked what was going on, pt briefly stopped hitting her head and said her stomach hurts, she fainted earlier, and she woke up \"feeling violent.\" She sat up and hit the back of her head several times against the wall. Writer asked pt to please stop hitting her head so she doesn't hurt herself, pt said she doesn't know how to stop, but was redirectable and stopped hitting her head while RN and DEC  spoke to her. No tissue damage was noted to her head by writer or RN.   "

## 2022-07-27 NOTE — DISCHARGE INSTRUCTIONS
"Aftercare Plan  If I am feeling unsafe or I am in a crisis, I will:   Contact my established care providers   Call the National Suicide Prevention Lifeline: 988  Go to the nearest emergency room   Call 911     Warning signs that I or other people might notice when a crisis is developing for me:   Increased panic attacks  Increased thoughts of harm to myself or others    Things I am able to do on my own or with others to cope or help me feel better:   Listen to music   Talk with staff    Things I can use or do for distraction:   Ask staff to play game/take walk  Watch a funny show/movie     Changes I can make to support my mental health and wellness:   Take your medications as prescribed  Get enough sleep and eat and drink regularly  Get outside for fresh air each day!    People in my life that I can ask for help:  Staff   German  My mom    Your [facility] has a mental health crisis team you can call 24/7: [facility] Mobile Crisis  [phone] (adults)  [phone] (children)     Additional resources and information:         Crisis Lines  Crisis Text Line  Text 787900  You will be connected with a trained live crisis counselor to provide support.    Por espanol, texto  SIRENA a 764122 o texto a 442-AYUDAME en WhatsApp    [employer] (LGBTQ Youth Crisis Line)  9.724.482.9645  text START to 178-619      Community Resources  Fast Tracker  Linking people to mental health and substance use disorder resources  fasttrackermn.org     [place] Mental Health Warm Line  Peer to peer support  Monday thru Saturday, 12 pm to 10 pm  927.038.4730 or 6.827.004.4644  Text \"Support\" to 24947    National [place] on Mental Illness (MAGY)  724.338.1095 or 1.888.MAGY.HELPS      Mental Health Apps  My3  https://my3app.org/    VirtualHopeBox  https://[employer]org/apps/virtual-hope-box/      Additional Information  Today you were seen by a licensed mental health professional through Triage and " Transition services, [facility] ([facility])  for a crisis assessment in the Emergency Department at [facility].  It is recommended that you follow up with your established providers (psychiatrist, mental health therapist, and/or primary care doctor - as relevant) as soon as possible. Coordinators from [facility] will be calling you in the next 24-48 hours to ensure that you have the resources you need.  You can also contact [facility] coordinators directly at [phone]. You may have been scheduled for or offered an appointment with a mental health provider. [facility] maintains an extensive network of licensed behavioral health providers to connect patients with the services they need.  We do not charge providers a fee to participate in our referral network.  We match patients with providers based on a patient's specific needs, insurance coverage, and location.  Our first effort will be to refer you to a provider within your care system, and will utilize providers outside your care system as needed.

## 2022-07-27 NOTE — ED NOTES
EMS eta 1 to 2 hours. Starting now. Please give PRN medication 30 minutes before ride home. Per plan of care from Dr. Lowe

## 2022-07-27 NOTE — ED NOTES
Bed: ED16  Expected date: 7/27/22  Expected time: 8:16 AM  Means of arrival:   Comments:  North 89 yo female, suicidal, cooperative

## 2022-07-28 ENCOUNTER — HOSPITAL ENCOUNTER (EMERGENCY)
Facility: CLINIC | Age: 22
Discharge: HOME | End: 2022-07-28
Attending: FAMILY MEDICINE | Admitting: FAMILY MEDICINE
Payer: MEDICARE

## 2022-07-28 ENCOUNTER — MEDICAL CORRESPONDENCE (OUTPATIENT)
Dept: HEALTH INFORMATION MANAGEMENT | Facility: CLINIC | Age: 22
End: 2022-07-28

## 2022-07-28 ENCOUNTER — NURSE TRIAGE (OUTPATIENT)
Dept: NURSING | Facility: CLINIC | Age: 22
End: 2022-07-28

## 2022-07-28 VITALS
RESPIRATION RATE: 16 BRPM | SYSTOLIC BLOOD PRESSURE: 130 MMHG | OXYGEN SATURATION: 96 % | HEART RATE: 97 BPM | TEMPERATURE: 98.4 F | DIASTOLIC BLOOD PRESSURE: 76 MMHG

## 2022-07-28 DIAGNOSIS — F60.3 BORDERLINE PERSONALITY DISORDER (H): Chronic | ICD-10-CM

## 2022-07-28 DIAGNOSIS — R45.851 SUICIDAL IDEATIONS: ICD-10-CM

## 2022-07-28 DIAGNOSIS — Z91.148 NONCOMPLIANCE WITH MEDICATION REGIMEN: ICD-10-CM

## 2022-07-28 DIAGNOSIS — F41.9 ANXIETY: ICD-10-CM

## 2022-07-28 PROCEDURE — 90791 PSYCH DIAGNOSTIC EVALUATION: CPT

## 2022-07-28 PROCEDURE — 99285 EMERGENCY DEPT VISIT HI MDM: CPT | Mod: 25

## 2022-07-28 PROCEDURE — 250N000013 HC RX MED GY IP 250 OP 250 PS 637: Performed by: FAMILY MEDICINE

## 2022-07-28 PROCEDURE — 250N000011 HC RX IP 250 OP 636: Performed by: FAMILY MEDICINE

## 2022-07-28 PROCEDURE — 99284 EMERGENCY DEPT VISIT MOD MDM: CPT | Performed by: FAMILY MEDICINE

## 2022-07-28 RX ORDER — BENZTROPINE MESYLATE 1 MG/1
1 TABLET ORAL ONCE
Status: COMPLETED | OUTPATIENT
Start: 2022-07-28 | End: 2022-07-28

## 2022-07-28 RX ORDER — ONDANSETRON 4 MG/1
4 TABLET, ORALLY DISINTEGRATING ORAL ONCE
Status: COMPLETED | OUTPATIENT
Start: 2022-07-28 | End: 2022-07-28

## 2022-07-28 RX ORDER — IBUPROFEN 400 MG/1
400 TABLET, FILM COATED ORAL 3 TIMES DAILY PRN
COMMUNITY
End: 2023-07-14

## 2022-07-28 RX ORDER — DIAPER,BRIEF,INFANT-TODD,DISP
EACH MISCELLANEOUS 3 TIMES DAILY PRN
COMMUNITY
End: 2023-07-14

## 2022-07-28 RX ORDER — ALUMINA, MAGNESIA, AND SIMETHICONE 2400; 2400; 240 MG/30ML; MG/30ML; MG/30ML
10 SUSPENSION ORAL EVERY 6 HOURS PRN
COMMUNITY
End: 2023-07-14

## 2022-07-28 RX ORDER — LORAZEPAM 0.5 MG/1
0.5 TABLET ORAL
COMMUNITY
End: 2024-05-19

## 2022-07-28 RX ORDER — TRAZODONE HYDROCHLORIDE 50 MG/1
100 TABLET, FILM COATED ORAL AT BEDTIME
COMMUNITY

## 2022-07-28 RX ORDER — HYDROXYZINE HYDROCHLORIDE 50 MG/1
50 TABLET, FILM COATED ORAL ONCE
Status: COMPLETED | OUTPATIENT
Start: 2022-07-28 | End: 2022-07-28

## 2022-07-28 RX ORDER — SENNOSIDES 8.6 MG
1 TABLET ORAL 2 TIMES DAILY PRN
COMMUNITY
End: 2024-05-19

## 2022-07-28 RX ORDER — LORAZEPAM 0.5 MG/1
0.5 TABLET ORAL ONCE
Status: DISCONTINUED | OUTPATIENT
Start: 2022-07-28 | End: 2022-07-28

## 2022-07-28 RX ORDER — LOPERAMIDE HCL 2 MG
2 CAPSULE ORAL 4 TIMES DAILY PRN
COMMUNITY
End: 2023-07-14

## 2022-07-28 RX ORDER — OLANZAPINE 5 MG/1
5 TABLET, ORALLY DISINTEGRATING ORAL ONCE
Status: COMPLETED | OUTPATIENT
Start: 2022-07-28 | End: 2022-07-28

## 2022-07-28 RX ORDER — LORAZEPAM 0.5 MG/1
0.5 TABLET ORAL ONCE
Status: COMPLETED | OUTPATIENT
Start: 2022-07-28 | End: 2022-07-28

## 2022-07-28 RX ORDER — TRAZODONE HYDROCHLORIDE 50 MG/1
50 TABLET, FILM COATED ORAL ONCE
Status: DISCONTINUED | OUTPATIENT
Start: 2022-07-28 | End: 2022-07-28

## 2022-07-28 RX ORDER — VENLAFAXINE HYDROCHLORIDE 225 MG/1
225 TABLET, EXTENDED RELEASE ORAL ONCE
Status: DISCONTINUED | OUTPATIENT
Start: 2022-07-28 | End: 2022-07-28

## 2022-07-28 RX ORDER — CYCLOBENZAPRINE HCL 10 MG
10 TABLET ORAL 3 TIMES DAILY PRN
COMMUNITY
End: 2024-05-19

## 2022-07-28 RX ORDER — FLUTICASONE PROPIONATE 50 MCG
2 SPRAY, SUSPENSION (ML) NASAL DAILY
COMMUNITY
End: 2023-07-14

## 2022-07-28 RX ORDER — VENLAFAXINE HYDROCHLORIDE 75 MG/1
75 CAPSULE, EXTENDED RELEASE ORAL ONCE
Status: COMPLETED | OUTPATIENT
Start: 2022-07-28 | End: 2022-07-28

## 2022-07-28 RX ORDER — VITAMIN B COMPLEX
25 TABLET ORAL ONCE
Status: COMPLETED | OUTPATIENT
Start: 2022-07-28 | End: 2022-07-28

## 2022-07-28 RX ORDER — MULTIVITAMIN,THERAPEUTIC
1 TABLET ORAL DAILY
COMMUNITY
End: 2023-07-14

## 2022-07-28 RX ORDER — CLOBETASOL PROPIONATE 0.5 MG/G
1 CREAM TOPICAL 2 TIMES DAILY
COMMUNITY
End: 2023-07-14

## 2022-07-28 RX ORDER — ACETAMINOPHEN 500 MG
1000 TABLET ORAL ONCE
Status: COMPLETED | OUTPATIENT
Start: 2022-07-28 | End: 2022-07-28

## 2022-07-28 RX ORDER — ACETAMINOPHEN 325 MG/1
650 TABLET ORAL EVERY 6 HOURS PRN
COMMUNITY

## 2022-07-28 RX ADMIN — ACETAMINOPHEN 1000 MG: 500 TABLET ORAL at 14:04

## 2022-07-28 RX ADMIN — VENLAFAXINE HYDROCHLORIDE 75 MG: 75 CAPSULE, EXTENDED RELEASE ORAL at 16:16

## 2022-07-28 RX ADMIN — HYDROXYZINE HYDROCHLORIDE 50 MG: 50 TABLET, FILM COATED ORAL at 14:56

## 2022-07-28 RX ADMIN — OLANZAPINE 5 MG: 5 TABLET, ORALLY DISINTEGRATING ORAL at 11:46

## 2022-07-28 RX ADMIN — ONDANSETRON 4 MG: 4 TABLET, ORALLY DISINTEGRATING ORAL at 14:02

## 2022-07-28 RX ADMIN — Medication 25 MCG: at 14:56

## 2022-07-28 RX ADMIN — BENZTROPINE MESYLATE 1 MG: 1 TABLET ORAL at 14:55

## 2022-07-28 RX ADMIN — OMEPRAZOLE 40 MG: 20 CAPSULE, DELAYED RELEASE ORAL at 14:56

## 2022-07-28 RX ADMIN — LORAZEPAM 0.5 MG: 0.5 TABLET ORAL at 17:14

## 2022-07-28 ASSESSMENT — ENCOUNTER SYMPTOMS
FATIGUE: 0
VOICE CHANGE: 0
AGITATION: 1
APPETITE CHANGE: 0
ACTIVITY CHANGE: 0
DYSPHORIC MOOD: 1
NERVOUS/ANXIOUS: 1
ARTHRALGIAS: 0
VOMITING: 0
CONFUSION: 0
HALLUCINATIONS: 0
SHORTNESS OF BREATH: 0
TROUBLE SWALLOWING: 0
DECREASED CONCENTRATION: 1
WEAKNESS: 0
FEVER: 0
BRUISES/BLEEDS EASILY: 0
DYSURIA: 0
ABDOMINAL PAIN: 0
COUGH: 0
BACK PAIN: 0
SEIZURES: 0
NAUSEA: 0

## 2022-07-28 NOTE — CONSULTS
Diagnostic Evaluation Consultation  Crisis Assessment    Patient was assessed: remote  Patient location: [facility] ED  Was a release of information signed: No. Reason: declined      Referral Data and Chief Complaint  Sadaf Ross is a 21 year old, who uses she/her pronouns, and presents to the ED via EMS. Patient is referred to the ED by community provider(s). Patient is presenting to the ED for the following concerns: suicidal ideation.      Informed Consent and Assessment Methods     Patient is her own guardian. Writer met with patient and explained the crisis assessment process, including applicable information disclosures and limits to confidentiality, assessed understanding of the process, and obtained consent to proceed with the assessment. Patient was observed to be able to participate in the assessment as evidenced by cooperating with interview process. Assessment methods included conducting a formal interview with patient, review of medical records, collaboration with medical staff, and obtaining relevant collateral information from family and community providers when available..     Over the course of this crisis assessment provided reassurance, offered validation, engaged patient in problem solving and disposition planning and worked with patient on safety and aftercare planning. Patient's response to interventions was minimally engaged, guarded.     Summary of Patient Situation  Sadaf reportedly present to ED following walk in visit to [facility] reporting suicidal ideation.  Pt has been seen in 4 of the last 4 days in the ED with similar presentation.  On intial contact, pt reports 'I wanna kill myself', pt is laying on hospital cot and remains in reclined position throughout assessment.   Pt has been given zyprexa prior to assessment due to increased agitation and distress due to another pt's behavior escalation. This writer discussed pt's   30+ days of successfully engaging in outpt services without  ED visits.  Pt reports she was walking, talking with sister, aunt and uncle.  Admits to taking medication regularly during that time.  Pt is irritable and at times obstinate, especially with medication.  States she will not take her medication tonight.  Discussed lessening of symptoms when medication compliant, pt reports 'SI is my baseline'.   Admits to thoughts of harming other, declines to share detail.  Admits to hearing voices, declines to share details.   Pt reports thoughts of killing self by 'cutting throat', does not have the means at this time per pt report.  Has not been taking medications, reports sleep has been disturbed, appetite has been impacted, reports experiencing vomiting after eating breakfast this am.         Brief Psychosocial History  Sadaf lives in a group home with other three other residents. Mother and sister are supports as well as Aunt and uncle.  Pt has limited peer relationships, is not working at this time and is receiving disability.  Pt is own guardian.    Significant Clinical History  Pt has had multiple ED visits for SI, HI, and SIB.  Has been admitted to [facility] admissions, last one in July, 2021  PMH dx include: ADHD, Bipolar I, MDD, RAMON, BPD, and Intellectual Disability  Pt has  and care team at [facility],  home staff including nurse on site; medication management and therapy [facility]       Collateral Information  Collateral contact with Group home nurse, Arlene Sawyer.  Pt has refused medications last two nights, has been in the ED every day this week.  Medications are managed through [facility].  Pt was started on IM  Airstrada which has been stabilizing for pt. Arlene will send updated medication list to ED.  Reports pt transitions better to  if sent by medical cab vs ambulance, stated pt seems to get activated when in ambulance and frequently will want to return to ED.      Collateral contact with pt's , Amrik Cheung at [facility].  Will  reports pt was at the clinic today reporting SI, was hitting self, was rocking back and forth, feigning seizure.  Care team was able to work with pt to engage coping strategies, self soothing.  Sadaf was walking around, engaging in conversation, ready to return to  when Ambulance arrived.  Pt then insisted on coming to the ED.  Pt has extensive care team with  staff, therapist and psychiatrist at [facility] and [facility], care team at [facility] and additional inhome and community providers.       Risk Assessment  ESS-6  1.a. Over the past 2 weeks, have you had thoughts of killing yourself? Yes  1.b. Have you ever attempted to kill yourself and, if yes, when did this last happen? No   2. Recent or current suicide plan? Yes cut throat, does not have means   3. Recent or current intent to act on ideation? No  4. Lifetime psychiatric hospitalization? Yes  5. Pattern of excessive substance use? No  6. Current irritability, agitation, or aggression? Yes  Scoring note: BOTH 1a and 1b must be yes for it to score 1 point, if both are not yes it is zero. All others are 1 point per number. If all questions 1a/1b - 6 are no, risk is negligible. If one of 1a/1b is yes, then risk is mild. If either question 2 or 3, but not both, is yes, then risk is automatically moderate regardless of total score. If both 2 and 3 are yes, risk is automatically high regardless of total score.      Score: 3, moderate risk      Does the patient have access to lethal means? No     Does the patient engage in non-suicidal self-injurious behavior (NSSI/SIB)? Banging head, scratching arms, biting self     Does the patient have thoughts of harming others? Yes.  Does the patient have a specific victim in mind? No Do they have a plan? No Do they have intent? No Is this a duty to warn situation?  no     Is the patient engaging in sexually inappropriate behavior?  no        Current Substance Abuse     Is there recent substance abuse? no     Was a urine drug screen  or blood alcohol level obtained: No       Mental Status Exam     Affect: Blunted   Appearance: Appropriate    Attention Span/Concentration: Inattentive  Eye Contact: Variable   Fund of Knowledge: Delayed    Language /Speech Content: Fluent   Language /Speech Volume: Normal    Language /Speech Rate/Productions: Minimally Responsive    Recent Memory: Variable   Remote Memory: Poor   Mood: Anxious, Depressed and Irritable    Orientation to Person: Yes    Orientation to Place: Yes   Orientation to Time of Day: Yes    Orientation to Date: Yes    Situation (Do they understand why they are here?): Yes    Psychomotor Behavior: Normal    Thought Content: Suicidal   Thought Form: Obsessive/Perseverative repeatedly states desire to die,      History of commitment: No      Medication    Psychotropic medications: yes, airstada, ativan, trazadone, hydroxyzine,     Medication changes made in the last two weeks: No       Current Care Team    Primary Care Provider:Jess Wilkins MD, [facility]  Psychiatrist: [facility]  Therapist: No  : German Cheung, [facility], [phone]     CTSS or ARMHS: No  ACT Team: No  Other: No      Diagnosis    301.83 (F60.3) Borderline Personality Disorder, Primary   Intellectual Disabilites  319 (F79) Unspecified Intellectual Disability (Intellectual Developmental Disorder) - by history   300.02 (F41.1) Generalized Anxiety Disorder - primary       Clinical Summary and Substantiation of Recommendations    Pt with hx of intellectual disabilities, RAMON, BPD, MDD, Bipolar I, chronic SI, SIB presents to ED with SI in context of medication non compliance.   Pt's symptoms are consistent with baseline presentation and is at slightly higher risk of harm due to intellectual disabilities. Pt has exceptional supports in community including  with 24/7 staffing, case management team through [facility], psychiatric and therapy services through [facility].  Recommendation is for pt to  receive daily medications in ED prior to being discharged back to .  Pt transitions best to  when send via Medical cab vs Ambulance.      Disposition    Recommended disposition: Medication Management and Group Home: return to group home and established outpt supports       Reviewed case and recommendations with attending provider. Attending Name: Dr. Estrella       Attending concurs with disposition: Yes       Patient concurs with disposition: No: pt is unclear about desired disposition       Guardian concurs with disposition: NA      Final disposition: .Medication Management and Group Home: return to group home and established outpt supports      Outpatient Details (if applicable):   Aftercare plan and appointments placed in the AVS and provided to patient: Yes. Given to patient by ED providers    Was lethal means counseling provided as a part of aftercare planning? Yes - describe pt does not have access to means;       Assessment Details    Patient interview started at: 1200 and completed at: 1215.     Total duration spent on the patient case in minutes: 1.25 hrs      CPT code(s) utilized: 13094 - Psychotherapy for Crisis - 60 (30-74*) min       YOSHI Amos, MSW, LICSW  DEC - Triage & Transition Services

## 2022-07-28 NOTE — PHARMACY-ADMISSION MEDICATION HISTORY
Admission Medication History Completed by Pharmacy    See [facility] Admission Navigator for allergy information, preferred outpatient pharmacy, prior to admission medications and immunization status.     Medication History Sources:     Alvin BELLA     Spoke with aid at home [phone]    Changes made to PTA medication list (reason):    Added: scheduled meds: trazodone, clobetasol, fluticasone propionate, multivitamin  o PRN meds:  Cyclobenzaprine, hydrocortisone cream, ibuprofen, loperamide, lorazepam, maalox, robitussin, senna, tylenol, and diclofenac gel    Deleted: Abilify Maintena, Ortho-cyclen, Benzonatate, promethazine suppository, olanzapine ODT and oral    Changed: benztropine dose changed to 1 mg once daily in the evening, prochlorperazine changed to PRN, hydroxyzine changed to PRN    Additional Information:    Nurse aid stated that the patient had refused their medications since Sunday 7/24 but received their Ativan prior to being brought to the emergency room.    Stated that the patient receives their Aristada on the 22nd of each month and confirmed that the patient received their Aristada on 7/22/22     Prior to Admission medications    Medication Sig Last Dose Taking? Auth Provider Long Term End Date   acetaminophen (TYLENOL) 325 MG tablet Take 650 mg by mouth every 6 hours as needed for mild pain  at PRN Yes Unknown, Entered By History     alum & mag hydroxide-simethicone (MAALOX  ES) 400-400-40 MG/5ML SUSP suspension Take 10 mLs by mouth every 4 hours as needed for indigestion  at PRN Yes Unknown, Entered By History     ARIPiprazole lauroxil ER (ARISTADA) 882 MG/3.2ML intra-muscular Inject 882 mg into the muscle every 30 days On the 22nd of each month 7/22/2022 Yes Unknown, Entered By History     benztropine (COGENTIN) 1 MG tablet Take 1 mg by mouth every evening 7/24/2022 Yes Unknown, Entered By History Yes    calcium carbonate (TUMS) 500 MG chewable tablet Take 1 chew tab by mouth 2 times daily  as needed for heartburn  Past Week at Unknown time Yes Reported, Patient     chlorhexidine (PERIDEX) 0.12 % solution Swish and spit 15 mLs in mouth 2 times daily  7/24/2022 Yes Reported, Patient     clobetasol (TEMOVATE) 0.05 % CREA cream Apply 1 Application topically 2 times daily For up to 14 days Unknown at Unknown time Yes Unknown, Entered By History     cyclobenzaprine (FLEXERIL) 10 MG tablet Take 10 mg by mouth 3 times daily as needed for muscle spasms  at PRN Yes Unknown, Entered By History     diclofenac (VOLTAREN) 1 % topical gel Apply 2 g topically daily as needed for moderate pain  at PRN Yes Unknown, Entered By History     docusate sodium (COLACE) 100 MG capsule Take 100 mg by mouth 2 times daily  7/24/2022 Yes Reported, Patient     fluticasone (FLONASE) 50 MCG/ACT nasal spray Spray 2 sprays into both nostrils daily 7/24/2022 Yes Unknown, Entered By History     guaiFENesin (ROBITUSSIN) 100 MG/5ML SYRP Take 10 mLs by mouth 3 times daily as needed for cough  at PRN Yes Unknown, Entered By History     hydrocortisone (CORTAID) 0.5 % external cream Apply topically 3 times daily as needed for rash  at PRN Yes Unknown, Entered By History     hydrOXYzine (ATARAX) 50 MG tablet Take 50 mg by mouth 2 times daily as needed for anxiety Past Month at PRN Yes Reported, Patient     ibuprofen (ADVIL/MOTRIN) 400 MG tablet Take 400 mg by mouth 3 times daily as needed for moderate pain  at PRN Yes Unknown, Entered By History     loperamide (IMODIUM) 2 MG capsule Take 2 mg by mouth 4 times daily as needed for diarrhea  at PRN Yes Unknown, Entered By History     LORazepam (ATIVAN) 0.5 MG tablet Take 0.5 mg by mouth once as needed for anxiety Give as needed any time she has the urge to call 911 or to visit the ER 7/27/2022 Yes Unknown, Entered By History     metoclopramide (REGLAN) 10 MG tablet Take 1 tablet (10 mg) by mouth 2 times daily as needed (nausea)  Patient taking differently: Take 10 mg by mouth 4 times daily as  needed (nausea)  at PRN Yes Leo Lowe MD     multivitamin, therapeutic (THERA-VIT) TABS tablet Take 1 tablet by mouth daily 7/24/2022 Yes Unknown, Entered By History     omeprazole (PRILOSEC) 40 MG DR capsule Take 40 mg by mouth daily before breakfast 7/24/2022 Yes Reported, Patient     ondansetron (ZOFRAN) 4 MG tablet Take 4 mg by mouth every 8 hours as needed for nausea  at PRN Yes Unknown, Entered By History     polyethylene glycol (MIRALAX) 17 g packet Take 1 packet by mouth daily 7/24/2022 Yes Reported, Patient No    prochlorperazine (COMPAZINE) 10 MG tablet Take 10 mg by mouth every 6 hours as needed for nausea or vomiting  Yes Reported, Patient     sennosides (SENOKOT) 8.6 MG tablet Take 1 tablet by mouth 2 times daily as needed for constipation  at PRN Yes Unknown, Entered By History     venlafaxine (EFFEXOR XR) 75 MG 24 hr capsule Take 3 capsules by mouth daily 7/24/2022 Yes Reported, Patient No    Vitamin D, Cholecalciferol, 25 MCG (1000 UT) TABS Take 1,000 Units by mouth daily  7/24/2022 Yes Reported, Patient       Date completed: 07/28/22    Medication history completed by: Mansoor Cleaning [facility]

## 2022-07-28 NOTE — ED NOTES
Writer spoke with pt's GH after administering the pt's evening medications. GH stated they are ready for the pt to return to the . Transportation requested.

## 2022-07-28 NOTE — ED PROVIDER NOTES
ED Provider Note  [facility]      History     Chief Complaint   Patient presents with     Suicidal     HPI  Sadaf Ross is a 21 year old female who presents with suicidal ideations from [facility] clinic. Patient history of of bipolar 1 disorder borderline personality lives in a group home.  Patient chronically suicidal.  Patient apparently went to the clinic today and they were working through issues etc. patient ready to go home and then the ambulance apparently arrived therefore then she decided she wanted to go to the hospital as she was feeling suicidal.  Patient now presents to the ER for suicidal ideations had reported she was going to cut her throat but does not have access to this also reporting hanging herself potentially.  Patient has not been taking her medications last few days also now presents for evaluation.  Other history limited    Past Medical History  Past Medical History:   Diagnosis Date     ADHD (attention deficit hyperactivity disorder)      Bipolar 1 disorder (H)      Borderline personality disorder (H)      Depression      Depressive disorder      Intellectual disability      Obesity      Syncope      Past Surgical History:   Procedure Laterality Date     APPENDECTOMY       APPENDECTOMY       acetaminophen (TYLENOL) 325 MG tablet  alum & mag hydroxide-simethicone (MAALOX  ES) 400-400-40 MG/5ML SUSP suspension  ARIPiprazole lauroxil ER (ARISTADA) 882 MG/3.2ML intra-muscular  benztropine (COGENTIN) 1 MG tablet  chlorhexidine (PERIDEX) 0.12 % solution  clobetasol (TEMOVATE) 0.05 % CREA cream  cyclobenzaprine (FLEXERIL) 10 MG tablet  diclofenac (VOLTAREN) 1 % topical gel  docusate sodium (COLACE) 100 MG capsule  fluticasone (FLONASE) 50 MCG/ACT nasal spray  guaiFENesin (ROBITUSSIN) 100 MG/5ML SYRP  hydrocortisone (CORTAID) 0.5 % external cream  hydrOXYzine (ATARAX) 50 MG tablet  ibuprofen (ADVIL/MOTRIN) 400 MG tablet  loperamide (IMODIUM) 2 MG capsule  LORazepam  (ATIVAN) 0.5 MG tablet  metoclopramide (REGLAN) 10 MG tablet  multivitamin, therapeutic (THERA-VIT) TABS tablet  omeprazole (PRILOSEC) 40 MG DR capsule  ondansetron (ZOFRAN) 4 MG tablet  polyethylene glycol (MIRALAX) 17 g packet  prochlorperazine (COMPAZINE) 10 MG tablet  sennosides (SENOKOT) 8.6 MG tablet  traZODone (DESYREL) 50 MG tablet  venlafaxine (EFFEXOR XR) 75 MG 24 hr capsule  Vitamin D, Cholecalciferol, 25 MCG (1000 UT) TABS  calcium carbonate (TUMS) 500 MG chewable tablet      Allergies   Allergen Reactions     Penicillins Rash and Unknown     Family History  Family History   Problem Relation Age of Onset     Diabetes Type 1 Father      Cancer Paternal Grandfather      Social History   Social History     Tobacco Use     Smoking status: Current Every Day Smoker     Packs/day: 0.50     Years: 5.00     Pack years: 2.50     Types: Vaping Device     Smokeless tobacco: Never Used   Substance Use Topics     Alcohol use: No     Drug use: No      Past medical history, past surgical history, medications, allergies, family history, and social history were reviewed with the patient. No additional pertinent items.       Review of Systems   Constitutional: Negative for activity change, appetite change, fatigue and fever.   HENT: Negative for trouble swallowing and voice change.    Eyes: Negative for visual disturbance.   Respiratory: Negative for cough and shortness of breath.    Cardiovascular: Negative for chest pain.   Gastrointestinal: Negative for abdominal pain, nausea and vomiting.   Genitourinary: Negative for dysuria.   Musculoskeletal: Negative for arthralgias and back pain.   Skin: Negative for rash.   Allergic/Immunologic: Negative for immunocompromised state.   Neurological: Negative for seizures and weakness.   Hematological: Does not bruise/bleed easily.   Psychiatric/Behavioral: Positive for agitation, behavioral problems, decreased concentration, dysphoric mood and suicidal ideas. Negative for  confusion and hallucinations. The patient is nervous/anxious.    All other systems reviewed and are negative.    A complete review of systems was performed with pertinent positives and negatives noted in the HPI, and all other systems negative.    Physical Exam   BP: 132/72  Pulse: 108  Temp: 98.5  F (36.9  C)  Resp: 16  SpO2: 97 %  Physical Exam  Vitals and nursing note reviewed.   Constitutional:       General: She is in acute distress.      Appearance: She is well-developed. She is not diaphoretic.      Comments: Patient somewhat anxious agitated here in the ER but try to redirect.   HENT:      Head: Normocephalic and atraumatic.      Nose: No congestion.   Eyes:      Extraocular Movements: Extraocular movements intact.   Cardiovascular:      Rate and Rhythm: Normal rate and regular rhythm.   Pulmonary:      Effort: No respiratory distress.      Breath sounds: No stridor.   Abdominal:      Tenderness: There is no guarding.   Musculoskeletal:         General: No signs of injury.      Cervical back: Normal range of motion. No tenderness.   Skin:     General: Skin is warm and dry.      Coloration: Skin is not pale.      Findings: No erythema or rash.   Neurological:      Mental Status: She is alert and oriented to person, place, and time. Mental status is at baseline.   Psychiatric:      Comments: Patient somewhat anxious here in the ER flat affect redirectable at this point though somewhat agitated though         ED Course       Patient valuated here in the ER.  Initially agitated upon arrival and assessment.  Patient did receive 5 mg of Zyprexa after reviewing records.  Patient did calm with this somewhat.    Our mental health  did evaluate the patient here in the ER the very familiar with the patient she was assessed yesterday also.  Patient reassessed at this point she has been noncompliant with medication for last few days they wanted her to get her medications to see if this will help her then be more  compliant back at her group home.  At this point they feel that she is chronically suicidal and is not an eminent danger to herself and this seems to be a chronic state that she is in group homes comfortable with her returning also.  Patient after pharmacy was involved we did reorder medications including Ativan along with other medications here in the ER because patient has been off from her Effexor for several days we will decrease the dose to 75 mg once a day for a week and then increase to 150 daily for a week and then return back to 225 mg daily after that.    At this point otherwise planning for patient to be discharged back with [facility] cab to her group home as they are willing to take her back we will then resume all the previous orders medications etc. with the recommended dosing changes on her discharge papers.      Procedures           Suicide assessment completed by mental health (D.E.C., LCSW, etc.)       No results found for any visits on 07/28/22.  Medications   OLANZapine zydis (zyPREXA) ODT tab 5 mg (5 mg Oral Given 7/28/22 1146)   ondansetron (ZOFRAN ODT) ODT tab 4 mg (4 mg Oral Given 7/28/22 1402)   acetaminophen (TYLENOL) tablet 1,000 mg (1,000 mg Oral Given 7/28/22 1404)   omeprazole (priLOSEC) CR capsule 40 mg (40 mg Oral Given 7/28/22 1456)   benztropine (COGENTIN) tablet 1 mg (1 mg Oral Given 7/28/22 1455)   Vitamin D3 (CHOLECALCIFEROL) tablet 25 mcg (25 mcg Oral Given 7/28/22 1456)   hydrOXYzine (ATARAX) tablet 50 mg (50 mg Oral Given 7/28/22 1456)   venlafaxine (EFFEXOR XR) 24 hr capsule 75 mg (75 mg Oral Given 7/28/22 1616)   LORazepam (ATIVAN) tablet 0.5 mg (0.5 mg Oral Given 7/28/22 1714)        Assessments & Plan (with Medical Decision Making) 21-year-old female history of chronic suicidal ideations borderline personality who is noncompliant with medication last few days presented the ER from clinic with suicidal ideations.  Patient here somewhat agitated received Zyprexa 5 mg orally in the  ER improved was seen by her mental health  at this point coordination with the group home they are willing to take the patient back.  I had pharmacy review her medications we did alter her Effexor as she has not been taken for the last several days and started a lower dose 75 mg daily for a week and then will increase to 150 daily for a week and then back to 225 mg daily after that.  Continue other medications this point.  She did receive her evening medications here in the ER.  At this point group home is willing to take her back.  Patient more calm and is assessed several times in the ER and has been resting comfortable without distress.  Plan at this point return to group home with follow-up and dosing changes on medications.       I have reviewed the nursing notes. I have reviewed the findings, diagnosis, plan and need for follow up with the patient.    New Prescriptions    No medications on file       Final diagnoses:   Suicidal ideations   Borderline personality disorder (H)   Noncompliance with medication regimen       --  Rakesh Estrella  [facility] EMERGENCY DEPARTMENT  7/28/2022      This note was created at least in part by the use of dragon voice dictation system. Inadvertent typographical errors may still exist.  Rakesh Estrella MD.    Patient evaluated in the emergency department during the COVID-19 pandemic period. Careful attention to patients safety was addressed throughout the evaluation. Evaluation and treatment management was initiated with disposition made efficiently and appropriate as possible to minimize any risk of potential exposure to patient during this evaluation.       Rakesh Estrella MD  07/28/22 1454

## 2022-07-28 NOTE — ED TRIAGE NOTES
Per EMS the Pt walked into the [facility] and stated she was suicidal and they called 911 and EMS brought the Pt here to be seen.

## 2022-07-28 NOTE — TELEPHONE ENCOUNTER
Covid screening completed      Patient having chest pain 9/10 severe and almost fainted.    Yesterday fell.    Patient instructed to go to ER or call 911 if needed.    (none of the care advice applied to her situation)    Chloe Perez RN  [facility] Nurse Advisor        Reason for Disposition    SEVERE chest pain    Additional Information    Negative: Severe difficulty breathing (e.g., struggling for each breath, speaks in single words)    Negative: Passed out (i.e., fainted, collapsed and was not responding)    Negative: Difficult to awaken or acting confused (e.g., disoriented, slurred speech)    Negative: Shock suspected (e.g., cold/pale/clammy skin, too weak to stand, low BP, rapid pulse)    Negative: Chest pain lasting longer than 5 minutes and ANY of the following:* Over 45 years old* Over 30 years old and at least one cardiac risk factor (e.g., diabetes, high blood pressure, high cholesterol, smoker, or strong family history of heart disease)* History of heart disease (i.e., angina, heart attack, heart failure, bypass surgery, takes nitroglycerin)* Pain is crushing, pressure-like, or heavy    Negative: Heart beating < 50 beats per minute OR > 140 beats per minute    Negative: Visible sweat on face or sweat dripping down face    Negative: Sounds like a life-threatening emergency to the triager    Negative: Followed an injury to chest    Protocols used: CHEST PAIN-A-OH

## 2022-07-28 NOTE — DISCHARGE INSTRUCTIONS
OK to return to group home.  Resume ycur current medications except take your Effexor ER 75mg daily for a week, then increase to 150mg daily for a week, then return back to 225mg daily after that.  Follow up with MD for med recheck also.        Aftercare Plan  If I am feeling unsafe or I am in a crisis, I will:   Contact my established care providers   Call the [place] Suicide Prevention Lifeline: 988  Go to the nearest emergency room   Call 911     Warning signs that I or other people might notice when a crisis is developing for me: refusing medications, poor sleep, increased physical symptoms, poor appetite, quick to get upset or angry    Things I am able to do on my own to cope or help me feel better: listen to music, take a walk, work on an art project, play a video game , take medications as prescribed    Things that I am able to do with others to cope or help me better: talk with mom, sister, aunt or uncle, visit with family in person, take a walk or going on outing with  staff and residents,  connect with Will at [facility]    Things I can use or do for distraction: exercise, music, art, playing a game with others     Changes I can make to support my mental health and wellness: take medication as prescribed, meet with providers when scheduled, practice healthy sleep regimen, exercise, join a health club, volunteer.     People in my life that I can ask for help: mom, sister, family, psychiatrist, care team at [facility],     Your [facility] has a mental health crisis team you can call 24/7: [facility] Mobile Crisis  [phone] (adults)  [phone] (children)    Other things that are important when I'm in crisis: move to a safe place, slow down, take deep breaths, count to 50 before making any decisions, ask for Hydroxyzine, take medications as prescribed.         Crisis Lines  Crisis Text Line  Text 218619  You will be connected with a trained live crisis counselor to provide support.    Por  "major, texto  SIRENA a 421266 o texto a 442-AYUDAME en WhatsApp    [employer] (LGBTQ Youth Crisis Line)  7.821.848.3757  text START to 097-182      Community Resources  Fast Tracker  Linking people to mental health and substance use disorder resources  [employer]     [place] Mental Health Warm Line  Peer to peer support  Monday thru Saturday, 12 pm to 10 pm  742.993.0335 or 4.272.289.4035  Text \"Support\" to 19692    National [place] on Mental Illness (MAGY)  383.948.5424 or 1.888.MAGY.HELPS      Mental Health Apps  My3  https://atVenu.org/    VirtualHopeBox  https://Hipbone/apps/virtual-hope-box/      Additional Information  Today you were seen by a licensed mental health professional through Triage and Transition services, Behavioral Healthcare Providers ([facility])  for a crisis assessment in the Emergency Department at [facility].  It is recommended that you follow up with your established providers (psychiatrist, mental health therapist, and/or primary care doctor - as relevant) as soon as possible. Coordinators from [facility] will be calling you in the next 24-48 hours to ensure that you have the resources you need.  You can also contact [facility] coordinators directly at [phone]. You may have been scheduled for or offered an appointment with a mental health provider. [facility] maintains an extensive network of licensed behavioral health providers to connect patients with the services they need.  We do not charge providers a fee to participate in our referral network.  We match patients with providers based on a patient's specific needs, insurance coverage, and location.  Our first effort will be to refer you to a provider within your care system, and will utilize providers outside your care system as needed.        "

## 2022-07-29 ENCOUNTER — NURSE TRIAGE (OUTPATIENT)
Dept: NURSING | Facility: CLINIC | Age: 22
End: 2022-07-29

## 2022-07-29 NOTE — TELEPHONE ENCOUNTER
"\"I feel really weak this morning and I throw up this morning. I'm just not feeling like myself.\" Sadaf states she is having moderate breathing difficulty and moderate generalized weakness. Patient has been able to ambulate and use the restroom and has been drinking fluids. Sadaf is also reporting feelings of hurting herself and/or others. Patient states this feeling is not any different than what she experienced yesterday when she was at the [facility] ED. Patient also has a history of Bipolar and chronically suicidal and lives in a group home. Otherwise, Sadaf is awake, alert, and responding appropriately.     Triage guidelines recommend to see a provider within 4 hours. Sadaf's PCP is at the [facility] clinic and patient was instructed to call the clinic when it opens. Patient also states she has an appointment with her psychiatrist today and RN advised for patient to keep this appointment. RN advised to call back with any changes, worsening of symptoms, and questions or concerns. Patient verbalized understanding of and agreement with plan and had no further questions.     Reason for Disposition    [1] MODERATE weakness (i.e., interferes with work, school, normal activities) AND [2] cause unknown  (Exceptions: weakness with acute minor illness, or weakness from poor fluid intake)    Additional Information    Negative: Severe difficulty breathing (e.g., struggling for each breath, speaks in single words)    Negative: Shock suspected (e.g., cold/pale/clammy skin, too weak to stand, low BP, rapid pulse)    Negative: Difficult to awaken or acting confused (e.g., disoriented, slurred speech)    Negative: [1] Fainted > 15 minutes ago AND [2] still feels too weak or dizzy to stand    Negative: [1] SEVERE weakness (i.e., unable to walk or barely able to walk, requires support) AND [2] new onset or worsening    Negative: Sounds like a life-threatening emergency to the triager    Negative: Difficulty breathing    Negative: Heart " "beating < 50 beats per minute OR > 140 beats per minute    Negative: Extra heart beats OR irregular heart beating   (i.e., \"palpitations\")    Negative: Follows bleeding (e.g., from vomiting, rectum, vagina; Exception: small brief weakness from sight of a small amount blood)    Negative: Black or tarry bowel movements    Negative: [1] Drinking very little AND [2] dehydration suspected (e.g., no urine > 12 hours, very dry mouth, very lightheaded)    Negative: Patient sounds very sick or weak to the triager    Protocols used: WEAKNESS (GENERALIZED) AND FATIGUE-A-TERELL Magaña, RN-BSN  [facility] Nurse Advisors   "

## 2022-07-30 ENCOUNTER — NURSE TRIAGE (OUTPATIENT)
Dept: NURSING | Facility: CLINIC | Age: 22
End: 2022-07-30

## 2022-07-30 NOTE — TELEPHONE ENCOUNTER
Nurse Triage SBAR    Is this a 2nd Level Triage? NO    Situation: patient called stating that she is feeling suicidal    Background: she has attempted suicide previously 3 times.  She has gone in for this several times.    Assessment: She is biting herself.  She has no weapons.  She tried to choke herself- with her hands.  She feels this way when she goes shopping- she feels like this right away whenever she goes into a grocery store, or any kind of store.  She does not try to harm herself in the store.  She usually feels more like harming herself when she gets home.    Protocol Recommended Disposition:   Call  Now    Recommendation: Call 911. Patient kept on the phone until 911 arrived.  Patient has staff member, they are by patient at the moment.    Staff notified that patient should go with 911.  Staff has her under observation until 911 comes.  911 arrived while writer was on the phone- patient went with 911.    Mercy Stevenson RN on 7/30/2022 at 3:26 PM        Reason for Disposition    Patient attempted suicide    Protocols used: SUICIDE [id number]-S-QJ

## 2022-07-30 NOTE — TELEPHONE ENCOUNTER
Patient calling reports not feeling well. Reports feeling panicky with history of panic attacks in the past. Reports going to the ER for this in the past. Patient also reports having suicidal thoughts recently with confusion and disorientation present. Denies difficulty breathing and blue face. Advised per protocol to call 911 with patient agreeable to the plan. Reports feeling safe to call 911.     Day Caldera RN 07/30/22 10:42 AM   [facility] Triage Nurse Advisor      Reason for Disposition    Difficult to awaken or acting confused (e.g., disoriented, slurred speech)    Additional Information    Negative: Bluish (or gray) lips or face now    Negative: Severe difficulty breathing (e.g., struggling for each breath, speaks in single words)    Protocols used: ANXIETY AND PANIC ATTACK-A-

## 2022-07-30 NOTE — TELEPHONE ENCOUNTER
This RN called 911 dispatch for pt who is actively suicidal is on the line talking with another triage RN. EMS is on the way.

## 2022-08-04 ENCOUNTER — NURSE TRIAGE (OUTPATIENT)
Dept: NURSING | Facility: CLINIC | Age: 22
End: 2022-08-04

## 2022-08-04 NOTE — TELEPHONE ENCOUNTER
"Patient calling -says she is having thoughts of suicide.  Says she was picked up by her sister about 10 minutes ago and she told her that she is six weeks pregnant.  Patient says this is upsetting to her that she is going to be an aunt and she wants to  \"take herself out.\"  Is threatening suicide now.    Triaged to disposition of Call  Now.  Patient agrees to do so now.    Marcy Partida RN  Triage Nurse Advisor      Reason for Disposition    Patient is threatening suicide now    Additional Information    Negative: Patient attempted suicide    Protocols used: DEPRESSION-A-OH      "

## 2022-08-07 ENCOUNTER — HOSPITAL ENCOUNTER (EMERGENCY)
Facility: CLINIC | Age: 22
Discharge: HOME | End: 2022-08-07
Attending: EMERGENCY MEDICINE | Admitting: EMERGENCY MEDICINE
Payer: MEDICARE

## 2022-08-07 ENCOUNTER — NURSE TRIAGE (OUTPATIENT)
Dept: NURSING | Facility: CLINIC | Age: 22
End: 2022-08-07

## 2022-08-07 VITALS
OXYGEN SATURATION: 99 % | HEART RATE: 94 BPM | RESPIRATION RATE: 14 BRPM | DIASTOLIC BLOOD PRESSURE: 72 MMHG | TEMPERATURE: 98.4 F | SYSTOLIC BLOOD PRESSURE: 119 MMHG

## 2022-08-07 DIAGNOSIS — F79 UNSPECIFIED INTELLECTUAL DISABILITIES: ICD-10-CM

## 2022-08-07 DIAGNOSIS — F60.3 BORDERLINE PERSONALITY DISORDER (H): ICD-10-CM

## 2022-08-07 DIAGNOSIS — F41.1 GENERALIZED ANXIETY DISORDER: ICD-10-CM

## 2022-08-07 PROCEDURE — 99285 EMERGENCY DEPT VISIT HI MDM: CPT | Mod: 25

## 2022-08-07 PROCEDURE — 90791 PSYCH DIAGNOSTIC EVALUATION: CPT

## 2022-08-07 PROCEDURE — 99284 EMERGENCY DEPT VISIT MOD MDM: CPT | Performed by: EMERGENCY MEDICINE

## 2022-08-07 PROCEDURE — 250N000011 HC RX IP 250 OP 636: Performed by: EMERGENCY MEDICINE

## 2022-08-07 RX ORDER — ONDANSETRON 8 MG/1
8 TABLET, ORALLY DISINTEGRATING ORAL ONCE
Status: COMPLETED | OUTPATIENT
Start: 2022-08-07 | End: 2022-08-07

## 2022-08-07 RX ADMIN — ONDANSETRON 8 MG: 8 TABLET, ORALLY DISINTEGRATING ORAL at 18:41

## 2022-08-07 NOTE — DISCHARGE INSTRUCTIONS
"Aftercare Plan  If I am feeling unsafe or I am in a crisis, I will:   Contact my established care providers   Call the National Suicide Prevention Lifeline: 988  Go to the nearest emergency room   Call 911     Things I am able to do on my own or with others to cope or help me feel better: go for a walk, talk to staff or family, practice grounding and mindfulness techniques (listed below)    Changes I can make to support my mental health and wellness: adhere to treatment recommendations, continue following up with all outpatient providers, practice coping skills and talk to staff     People in my life that I can ask for help: group home staff and family    Your [facility] has a mental health crisis team you can call 24/7: [facility] Mobile Crisis  [phone]       Crisis Lines  Crisis Text Line  Text 973253  You will be connected with a trained live crisis counselor to provide support.    Por oleanol, texto  SIRENA a 531558 o texto a 442-AYUDAME en WhatsApp    The Mikey Project (LGBTQ Youth Crisis Line)  5.803.522.8974  text START to 823-278      Community GeoVantage  Fast Tracker  Linking people to mental health and substance use disorder resources  fastAmpriusn.org     [place] Mental Health Warm Line  Peer to peer support  Monday thru Saturday, 12 pm to 10 pm  297.850.7840 or 9.175.104.1770  Text \"Support\" to 16231    National [place] on Mental Illness (MAGY)  120.930.7504 or 1.888.MAGY.HELPS      Mental Health Apps  My3  https://my3app.org/    VirtualHopeBox  https://Mobile System 7de.org/apps/virtual-hope-box/      Additional Information  Today you were seen by a licensed mental health professional through Triage and Transition services, [facility] ([facility])  for a crisis assessment in the Emergency Department at [facility].  It is recommended that you follow up with your established providers (psychiatrist, mental health therapist, and/or primary care doctor - as relevant) " as soon as possible. Coordinators from [facility] will be calling you in the next 24-48 hours to ensure that you have the resources you need.  You can also contact [facility] coordinators directly at [phone]. You may have been scheduled for or offered an appointment with a mental health provider. [facility] maintains an extensive network of licensed behavioral health providers to connect patients with the services they need.  We do not charge providers a fee to participate in our referral network.  We match patients with providers based on a patient's specific needs, insurance coverage, and location.  Our first effort will be to refer you to a provider within your care system, and will utilize providers outside your care system as needed.      Grounding Techniques:  Try to notice where you are, your surroundings including the people, the sounds like the TV or radio.  Concentrate on your breathing. Take a deep cleansing breath from your diaphragm. Count the breaths as you exhale. Make sure you breath slowly.  Hold something that you find comforting, for some it may be a stuffed animal or a blanket. Notice how it feels in your hands. Is it hard or soft?  During a non-crisis time make a list of positive affirmations. Print them out and keep them handy for times of intense anxiety. At those times, read them aloud.  Try the CarbonFlow game:  Name 5 things you can see in the room with you  Name 4 things you can feel ( chair on my back  or  feet on floor )   Name 3 things you can hear right now ( people talking  or  tv )   Name 2 things you can smell right now (or, 2 things you like the smell of)   Name 1 good thing about yourself  Create A Safe Place  Image a safe place -- it can be a real or imaginary place:   What do you see -- especially colors?   What sounds do you hear?   What sensations do you feel?   What smells do you smell?   What people or animals would you want in your safe place?   Imagine a protective bubble, wall or boundary  around your safe place.   Imagine a door or gate with a guard at your safe place.   Image a lock and key to your safe place and only you can unlock it.  You can draw or make a collage that represents your safe place.   Choose a souvenir of your safe place -- a color, an object, a song.   Keep your image of your safe place so you can come back to it when you need to.     Reduce Extreme Emotion  QUICKLY:  Changing Your Body Chemistry      T:  Change your body Temperature to change your autonomic nervous system   Use Ice Water to calm yourself down FAST   Put your face in a bowl of ice water (this is the best way; have the person keep his/her face in ice water for 30-45 seconds - initial research is showing that the longer s/he can hold her/his face in the water, the better the response), or   Splash ice water on your face, or hold an ice pack on your face      I:  Intensely exercise to calm down a body revved up by emotion   Examples: running, walking fast, jumping, playing basketball, weight lifting, swimming, calisthenics, etc.   Engage in exercises that DO NOT include violent behaviors. Exercises that utilize violent behaviors tend to function as  behavioral rehearsal,  and rather than calming the person down, may actually  rev  the person up more, increasing the likelihood of violence, and lessening the likelihood that they will  burn off  energy     P:  Progressively relax your muscles   Starting with your hands, moving to your forearms, upper arms, shoulders, neck, forehead, eyes, cheeks and lips, tongue and teeth, chest, upper back, stomach, buttocks, thighs, calves, ankles, feet   Tense (10 seconds,   of the way), then relax each muscle (all the way)   Notice the tension   Notice the difference when relaxed (by tensing first, and then relaxing, you are able to get a more thorough relaxation than by simply relaxing)     P: Paced breathing to relax   The standard technique is to begin with counting the number of  steps one takes for a typical inhale, then counting the steps one takes for a typical exhale, and then lengthening the amount of steps for the exhalation by one or two steps.  OR  Repeat this pattern for 1-2 minutes  Inhale for four (4) seconds   Exhale for six (6) to eight (8) seconds   Research demonstrated that one can change one's overall level of anxiety by doing this exercise for even a few minutes per day

## 2022-08-07 NOTE — CONSULTS
"Diagnostic Evaluation Consultation  Crisis Assessment    Patient was assessed: In Person  Patient location: [facility] ED  Was a release of information signed: No. Reason: parent/guardian not present      Referral Data and Chief Complaint  Sadaf is a 21 year old, who uses she/her pronouns, and presents to the ED via EMS. Patient is referred to the ED by self. Patient is presenting to the ED for the following concerns: suicidal ideation.      Informed Consent and Assessment Methods     Patient is her own guardian. Writer met with patient and explained the crisis assessment process, including applicable information disclosures and limits to confidentiality, assessed understanding of the process, and obtained consent to proceed with the assessment. Patient was observed to be able to participate in the assessment as evidenced by alert, oriented, and engaged in the assessment. Assessment methods included conducting a formal interview with patient, review of medical records, collaboration with medical staff, and obtaining relevant collateral information from family and community providers when available..     Over the course of this crisis assessment provided reassurance, offered validation, engaged patient in problem solving and disposition planning and worked with patient on safety and aftercare planning. Patient's response to interventions was calm and cooperative.     Summary of Patient Situation     Patient presents to ED via EMS w/ a history of Borderline Personality Disorder, Intellectual Disability, Generalized Anxiety Disorder, and chronic SI for concerns of suicidal ideation. Patient frequents the ED as a coping mechanism, and group home staff has explained to her this is not an appropriate use of the ED and has been working with her on using other coping skills. At the time of assessment, patient is tearful and states she feels \"death is taking over my body,\" and believed she was dying.  reassured her that " ED staff would not let her die, and that the doctor had already assessed her and determined she was not dying. This made patient feel a little better. Patient states she is upset today because her sister is pregnant and patient is not happy about this.  asked if her sister was happy, and patient tearfully stated that her sister was very happy.  asked patient if she was happy her sister was happy and she stated no, because she is too young to be having a baby. Patient endorses SI and listed various ways that she wanted complete suicide if she were to leave the hospital. No plan was concrete. Per chart review, SI is patient's baseline.  spoke with [facility] staff who explained they were aware this was coming soon since she was upset yesterday. Stated she believed yesterday that she worked for [employer] as a  and was upset they wouldn't let her come to the ED yesterday because she would miss her shift. Patient did not express these same beliefs at the time of assessment. [facility] staff is willing to accept patient back with transport.     Brief Psychosocial History     Patient lives in a group home with other three other residents. Mother and sister are supports as well as Aunt and uncle.  Pt has limited peer relationships, is not working at this time and is receiving disability.  Pt is own guardian. Denies any relevant legal issues.    Significant Clinical History     Pt has had multiple ED visits for SI, HI, and SIB.  Has been admitted to [facility] admissions, last one in July, 2021  PM dx include: ADHD, Bipolar I, MDD, RAMON, BPD, and Intellectual Disability  Pt has  and care team at [facility],  home staff including nurse on site; medication management and therapy [facility]     Collateral Information    [facility] Staff ([phone]) Staff was aware this ED visit was going to come soon. State patient was upset yesterday because she believed she worked at [employer]  as a  and was going to miss her shift if not allowed to come to the ED. They state this is routine behavior for patient and she is safe to come back.       Risk Assessment  ESS-6  1.a. Over the past 2 weeks, have you had thoughts of killing yourself? Yes  1.b. Have you ever attempted to kill yourself and, if yes, when did this last happen? No   2. Recent or current suicide plan? Yes jump off bridge - no access and could not name what bridge   3. Recent or current intent to act on ideation? No  4. Lifetime psychiatric hospitalization? Yes  5. Pattern of excessive substance use? No  6. Current irritability, agitation, or aggression? No  Scoring note: BOTH 1a and 1b must be yes for it to score 1 point, if both are not yes it is zero. All others are 1 point per number. If all questions 1a/1b - 6 are no, risk is negligible. If one of 1a/1b is yes, then risk is mild. If either question 2 or 3, but not both, is yes, then risk is automatically moderate regardless of total score. If both 2 and 3 are yes, risk is automatically high regardless of total score.      Score: 2, moderate risk      Does the patient have access to lethal means? No     Does the patient engage in non-suicidal self-injurious behavior (NSSI/SIB)? Banging head, scratching arms, biting self     Does the patient have thoughts of harming others? No     Is the patient engaging in sexually inappropriate behavior?  no        Current Substance Abuse     Is there recent substance abuse? no     Was a urine drug screen or blood alcohol level obtained: No       Mental Status Exam     Affect: Blunted   Appearance: Appropriate    Attention Span/Concentration: Inattentive  Eye Contact: Variable   Fund of Knowledge: Delayed    Language /Speech Content: Fluent   Language /Speech Volume: Normal    Language /Speech Rate/Productions: Minimally Responsive    Recent Memory: Variable   Remote Memory: Poor   Mood: Anxious, Depressed and Irritable    Orientation to  Person: Yes    Orientation to Place: Yes   Orientation to Time of Day: Yes    Orientation to Date: Yes    Situation (Do they understand why they are here?): Yes    Psychomotor Behavior: Normal    Thought Content: Suicidal   Thought Form: Obsessive/Perseverative      History of commitment: No       Medication    Psychotropic medications: yes, airstada, ativan, trazadone, hydroxyzine,     Medication changes made in the last two weeks: No        Current Care Team    Primary Care Provider:Jess Wilkins MD, [facility]  Psychiatrist: [facility] .  Therapist: No  : German Cheung [facility], [phone]     CTSS or ARMHS: No  ACT Team: No  Other: No    Diagnosis    1 Borderline personality disorder F60.3 - Primary, By History     2 Unspecified intellectual disability (intellectual developmental disorder) F79  - By History      3 Generalized anxiety disorder F41.1 - By History    Clinical Summary and Substantiation of Recommendations    After therapeutic assessment, intervention and aftercare planning by ED care team and LM and in consultation with attending provider, the patient's circumstances and mental state were appropriate for outpatient management. It is the recommendation of this clinician that pt discharge with OP MH support. A this time the pt is not presenting as an acute risk to self or others due to the following factors: Pt with hx of intellectual disabilities, RAMON, BPD, and chronic SI, SIB presents to ED with SI in response to her sister being pregnant and not being happy about this. Pt's symptoms are consistent with baseline presentation. Pt has a number of supports already in place in including  with 24/7 staffing, case management team through [facility], psychiatric and therapy services through [facility] .  Recommendation is for patient to return to group home, practice using coping skills as an alternative to ED visits unless there is an emergency and continue following  up with all current providers.     Disposition    Recommended disposition: Group Home: return to group home       Reviewed case and recommendations with attending provider. Attending Name: Dr. Emma Negron     Attending concurs with disposition: Yes       Patient concurs with disposition: Yes       Guardian concurs with disposition: NA      Final disposition: Group home: return to group home .     Outpatient Details (if applicable):   Aftercare plan and appointments placed in the AVS and provided to patient: Yes. Given to patient by ED Nursing Staff      Assessment Details    Patient interview started at: 6:15 pm and completed at: 6:40 pm.     Total duration spent on the patient case in minutes: .50 hrs      CPT code(s) utilized: 41900 - Psychotherapy (with patient) - 30 (16-37*) min       CHERI Brown, Psychotherapist Trainee  [facility] - Triage & Transition Services      Aftercare Plan  If I am feeling unsafe or I am in a crisis, I will:   Contact my established care providers   Call the National Suicide Prevention Lifeline: 988  Go to the nearest emergency room   Call 911      Things I am able to do on my own or with others to cope or help me feel better: go for a walk, talk to staff or family, practice grounding and mindfulness techniques (listed below)     Changes I can make to support my mental health and wellness: adhere to treatment recommendations, continue following up with all outpatient providers, practice coping skills and talk to staff      People in my life that I can ask for help: group home staff and family     Your [facility] has a mental health crisis team you can call 24/7: [facility] Mobile Crisis  [phone]         Crisis Lines  Crisis Text Line  Text 401298  You will be connected with a trained live crisis counselor to provide support.     Por espanol, texto  SIRENA a 797901 o texto a 442-AYUDAME en WhatsApp     The Mikey Project (LGBTQ Youth Crisis Line)  2.151.491.8676  text  "START to 864-359        Community Resources  Fast Tracker  Linking people to mental health and substance use disorder resources  Sport EnduranceckGaN Systemsn.Angelantoni      [place] Mental Health Warm Line  Peer to peer support  Monday thru Saturday, 12 pm to 10 pm  722.536.4048 or 0.909.439.2611  Text \"Support\" to 43931     National [place] on Mental Illness (MAGY)  585.845.8042 or 1.888.MAGY.HELPS        Mental Health Apps  My3  https://ecoVent.org/     VirtualHopeBox  https://Bounce Mobile/apps/virtual-hope-box/        Additional Information  Today you were seen by a licensed mental health professional through Triage and Transition services, [facility] ([facility])  for a crisis assessment in the Emergency Department at [facility].  It is recommended that you follow up with your established providers (psychiatrist, mental health therapist, and/or primary care doctor - as relevant) as soon as possible. Coordinators from [facility] will be calling you in the next 24-48 hours to ensure that you have the resources you need.  You can also contact [facility] coordinators directly at [phone]. You may have been scheduled for or offered an appointment with a mental health provider. [facility] maintains an extensive network of licensed behavioral health providers to connect patients with the services they need.  We do not charge providers a fee to participate in our referral network.  We match patients with providers based on a patient's specific needs, insurance coverage, and location.  Our first effort will be to refer you to a provider within your care system, and will utilize providers outside your care system as needed.       Grounding Techniques:    Try to notice where you are, your surroundings including the people, the sounds like the TV or radio.    Concentrate on your breathing. Take a deep cleansing breath from your diaphragm. Count the breaths as you exhale. Make sure you breath slowly.    Hold something that you " find comforting, for some it may be a stuffed animal or a blanket. Notice how it feels in your hands. Is it hard or soft?    During a non-crisis time make a list of positive affirmations. Print them out and keep them handy for times of intense anxiety. At those times, read them aloud.  Try the Altimet game:    Name 5 things you can see in the room with you    Name 4 things you can feel ( chair on my back  or  feet on floor )     Name 3 things you can hear right now ( people talking  or  tv )     Name 2 things you can smell right now (or, 2 things you like the smell of)     Name 1 good thing about yourself  Create A Safe Place    Image a safe place -- it can be a real or imaginary place:     What do you see -- especially colors?     What sounds do you hear?     What sensations do you feel?     What smells do you smell?     What people or animals would you want in your safe place?     Imagine a protective bubble, wall or boundary around your safe place.     Imagine a door or gate with a guard at your safe place.     Image a lock and key to your safe place and only you can unlock it.    You can draw or make a collage that represents your safe place.     Choose a souvenir of your safe place -- a color, an object, a song.     Keep your image of your safe place so you can come back to it when you need to.      Reduce Extreme Emotion  QUICKLY:  Changing Your Body Chemistry      T:  Change your body Temperature to change your autonomic nervous system     Use Ice Water to calm yourself down FAST     Put your face in a bowl of ice water (this is the best way; have the person keep his/her face in ice water for 30-45 seconds - initial research is showing that the longer s/he can hold her/his face in the water, the better the response), or     Splash ice water on your face, or hold an ice pack on your face      I:  Intensely exercise to calm down a body revved up by emotion     Examples: running, walking fast, jumping, playing  basketball, weight lifting, swimming, calisthenics, etc.     Engage in exercises that DO NOT include violent behaviors. Exercises that utilize violent behaviors tend to function as  behavioral rehearsal,  and rather than calming the person down, may actually  rev  the person up more, increasing the likelihood of violence, and lessening the likelihood that they will  burn off  energy     P:  Progressively relax your muscles     Starting with your hands, moving to your forearms, upper arms, shoulders, neck, forehead, eyes, cheeks and lips, tongue and teeth, chest, upper back, stomach, buttocks, thighs, calves, ankles, feet     Tense (10 seconds,   of the way), then relax each muscle (all the way)     Notice the tension     Notice the difference when relaxed (by tensing first, and then relaxing, you are able to get a more thorough relaxation than by simply relaxing)      P: Paced breathing to relax     The standard technique is to begin with counting the number of steps one takes for a typical inhale, then counting the steps one takes for a typical exhale, and then lengthening the amount of steps for the exhalation by one or two steps.  OR    Repeat this pattern for 1-2 minutes    Inhale for four (4) seconds     Exhale for six (6) to eight (8) seconds     Research demonstrated that one can change one's overall level of anxiety by doing this exercise for even a few minutes per day

## 2022-08-07 NOTE — ED NOTES
Bed: HW03  Expected date: 8/7/22  Expected time: 4:50 PM  Means of arrival: Ambulance  Comments:  [place] [address] 20yo F si

## 2022-08-07 NOTE — ED PROVIDER NOTES
ED Provider Note  [facility]      History     Chief Complaint   Patient presents with     Suicidal     Patient is more suicidal than normal because her sister is pregnant     HPI  Sadaf Ross is a 21 year old female who has a past medical history of major depressive disorder, borderline personality disorder, bipolar 1 disorder, chronic suicidal ideation, intellectual disability, ADHD, self-injurious behavior and psychosis who presents from her group home with increased suicidal ideation.  Patient recently found out that her sister is pregnant.  She states she does not know how far along her sister is, but is worried.  Patient states she has not taken her medications for 3 days because she thinks they might make her feel worse.  She states her suicidal ideation is chronic, she does not have a plan, however, she states that she is hearing voices that are telling her to jump off of a bridge.  The voices are not new.  Patient denies fever, cough, chest pain, shortness of breath.  She does report some nausea, no abdominal pain or vomiting.  Was seen in urgent care earlier today for nausea and was discharged.  Review of her chart reveals that she has frequent emergency department visits for suicidal thoughts.  Patient states there were 3 residents at her group home and that there is at least 1 or 2 staff members there 24 hours a day.    Past Medical History  Past Medical History:   Diagnosis Date     ADHD (attention deficit hyperactivity disorder)      Bipolar 1 disorder (H)      Borderline personality disorder (H)      Depression      Depressive disorder      Intellectual disability      Obesity      Syncope      Past Surgical History:   Procedure Laterality Date     APPENDECTOMY       APPENDECTOMY       acetaminophen (TYLENOL) 325 MG tablet  alum & mag hydroxide-simethicone (MAALOX  ES) 400-400-40 MG/5ML SUSP suspension  ARIPiprazole lauroxil ER (ARISTADA) 882 MG/3.2ML  intra-muscular  benztropine (COGENTIN) 1 MG tablet  calcium carbonate (TUMS) 500 MG chewable tablet  chlorhexidine (PERIDEX) 0.12 % solution  clobetasol (TEMOVATE) 0.05 % CREA cream  cyclobenzaprine (FLEXERIL) 10 MG tablet  diclofenac (VOLTAREN) 1 % topical gel  docusate sodium (COLACE) 100 MG capsule  fluticasone (FLONASE) 50 MCG/ACT nasal spray  guaiFENesin (ROBITUSSIN) 100 MG/5ML SYRP  hydrocortisone (CORTAID) 0.5 % external cream  hydrOXYzine (ATARAX) 50 MG tablet  ibuprofen (ADVIL/MOTRIN) 400 MG tablet  loperamide (IMODIUM) 2 MG capsule  LORazepam (ATIVAN) 0.5 MG tablet  metoclopramide (REGLAN) 10 MG tablet  multivitamin, therapeutic (THERA-VIT) TABS tablet  omeprazole (PRILOSEC) 40 MG DR capsule  ondansetron (ZOFRAN) 4 MG tablet  polyethylene glycol (MIRALAX) 17 g packet  prochlorperazine (COMPAZINE) 10 MG tablet  sennosides (SENOKOT) 8.6 MG tablet  traZODone (DESYREL) 50 MG tablet  venlafaxine (EFFEXOR XR) 75 MG 24 hr capsule  Vitamin D, Cholecalciferol, 25 MCG (1000 UT) TABS      Allergies   Allergen Reactions     Penicillins Rash and Unknown     Family History  Family History   Problem Relation Age of Onset     Diabetes Type 1 Father      Cancer Paternal Grandfather      Social History   Social History     Tobacco Use     Smoking status: Current Every Day Smoker     Packs/day: 0.50     Years: 5.00     Pack years: 2.50     Types: Vaping Device     Smokeless tobacco: Never Used   Substance Use Topics     Alcohol use: No     Drug use: No      Past medical history, past surgical history, medications, allergies, family history, and social history were reviewed with the patient. No additional pertinent items.       Review of Systems  A complete review of systems was performed with pertinent positives and negatives noted in the HPI, and all other systems negative.    Physical Exam   BP: 119/72  Pulse: 94  Temp: 98.4  F (36.9  C)  Resp: 14  SpO2: 99 %  Physical Exam    GEN:  Alert, well developed, no acute  distress  HEENT:  PERRL, EOMI, Mucous membranes are moist.   Cardio:  RRR, no murmur, radial pulses equal bilaterally  PULM:  Lungs clear, good air movement, no wheezes, rales   Abd:  Soft, normal bowel sounds, no focal tenderness  Back exam:  No CVA tenderness  Musculoskeletal:  normal range of motion, no lower extremity swelling or calf tenderness  Neuro:  Alert and oriented X3, Follows commands, moving all extremities spontaneously, normal gait  Skin:  Warm, dry   Psychiatric: Flat affect, reports chronic suicidal ideation, no plan, has auditory hallucinations telling her to jump off of the bridge.  ED Course      Procedures         Mental Health Risk Assessment      PSS-3    Date and Time Over the past 2 weeks have you felt down, depressed, or hopeless? Over the past 2 weeks have you had thoughts of killing yourself? Have you ever attempted to kill yourself? When did this last happen? User   08/07/22 1700 yes yes yes -- MEM      C-SSRS ([place])    Date and Time Q1 Wished to be Dead (Past Month) Q2 Suicidal Thoughts (Past Month) Q3 Suicidal Thought Method Q4 Suicidal Intent without Specific Plan Q5 Suicide Intent with Specific Plan Q6 Suicide Behavior (Lifetime) Within the Past 3 Months? RETIRED: Level of Risk per Screen Screening Not Complete User   08/07/22 1700 yes yes yes yes yes yes -- -- -- MEM              Suicide assessment completed by mental health (D.E.C., LCSW, etc.)    Patient had no further complaints in the ED.  The DEC assessment was done and they spoke with the group home staff who feel comfortable taking her home.  Patient frequently has thoughts of suicide and the group home staff is familiar with this.  She will follow-up with her outpatient providers     No results found for any visits on 08/07/22.  Medications   ondansetron (ZOFRAN ODT) ODT tab 8 mg (8 mg Oral Given 8/7/22 1421)        Assessments & Plan (with Medical Decision Making)   Patient presents with suicidal ideation.  She has  frequent ED visits for suicidal ideation and group home staff are familiar with this and have safety plan.  Patient does not have a plan for suicide and that these symptoms are chronic for her.  Has outpatient providers and she was advised to follow-up with them as soon as possible.  She was advised to return to the emergency department if new or worsening symptoms.    I have reviewed the nursing notes. I have reviewed the findings, diagnosis, plan and need for follow up with the patient.    Discharge Medication List as of 8/7/2022  8:20 PM          Final diagnoses:   Borderline personality disorder (H)       --  Emma Negron  [facility] EMERGENCY DEPARTMENT  8/7/2022     Emma Negron MD  08/08/22 0054

## 2022-08-07 NOTE — TELEPHONE ENCOUNTER
"Nurse Triage SBAR    Situation: Depression    Background: Patient calling.     Assessment: Patient has not been taking her medications. States \"the staff does not want me here\". Patient is upset that her sister is pregnant. Patient states she is \"talking stupid\", and that she \"wants to die\". No plan to harm herself now. She wants to get help.     Protocol Recommended Disposition: Emergency Department    Recommendation: According to the protocol, Patient should go to the ED now. Advised Patient to go to the ED now. Care advice given. Patient verbalizes understanding and agrees with plan of care. Patient does not have a ride into the ED. Advised the patient to call 911. Patient called 911 and RN called back to be on the line while EMS was on their way. She stated they will not be able to come tonight so the patient is going to call her sister to bring her into the ED.      Megan Lopez RN Nursing Advisor 8/7/2022 4:24 PM     Reason for Disposition    [1] Depression AND [2] unable to do any of normal activities (e.g., self care, school, work; in comparison to baseline).    Additional Information    Negative: Patient attempted suicide    Negative: Patient is threatening suicide now    Negative: Violent behavior, or threatening to physically hurt or kill someone    Negative: [1] Patient is very confused (disoriented, slurred speech) AND [2] no other adult (e.g., friend or family member) available    Negative: [1] Difficult to awaken or acting very confused (disoriented, slurred speech) AND [2] new-onset    Negative: Sounds like a life-threatening emergency to the triager    Negative: Suicide thoughts, threats, attempts, or questions    Negative: Questions or concerns about alcohol use, unhealthy alcohol use, binge drinking, intoxication, or withdrawal    Negative: Questions or concerns about substance use (drug use), unhealthy drug use, intoxication, or withdrawal    Negative: Questions or concerns about bipolar " disorder (manic depression)    Negative: Questions or concerns about depression during the postpartum period (< 1 year since delivery)    Protocols used: DEPRESSION-A-AH

## 2022-08-07 NOTE — ED TRIAGE NOTES
Patient brought in by EMS with SI. Report that sister is pregnant and that is a current stressor. Patient called, not  staff. Patient has a plan but would not disclose.

## 2022-08-07 NOTE — ED NOTES
Group home is aware patient will be discharged back to them and willingly accepts patient. Patient requires transportation back to group [place].

## 2022-08-08 ENCOUNTER — NURSE TRIAGE (OUTPATIENT)
Dept: FAMILY MEDICINE | Facility: CLINIC | Age: 22
End: 2022-08-08

## 2022-08-08 NOTE — TELEPHONE ENCOUNTER
"Patient was just in the ER last night at [facility] for SI.  Has not taken any of her medications x 4 days now.  States she want to kill herself. Will choke herself, wants to hit and beat herself, punch herself, will do anything she can to hurt herself.  Lives in a group home, \"they hate me\". \"They don't want me to live here\".  2 staff members currently present with patient.  States she has been awake all night since getting home from ED. Has not slept, has been crying all night.  Frequently has suicidal ideation.    Per triage protocol, advised on calling 911. Patient has thoughts of harming self, has plans to do so.     Patient stated she will call 911 herself.    Writer also spoke with group home staff member, Sophie or \"Yisel\". Patient had put phone call on speaker phone. Per Yisel, patient often states she is going to kill herself. Talks about this daily. Yisel stated that patient just went in to the ER last night and was sent home. They have a safety plan in place at . Yisel stated she was not going to call 911 but patient can call for herself if chooses. Ambulance drivers know patient well. Sometimes when they show up, patient will threaten to harm them as well and will refuse to go in to ER. Yisel made this sound like is a frequent occurrence for patient and was not surprised by patient calling in to the clinic system.  Encouraged patient to call psychiatrist if able, and to listen to the advice given by providers and paramedics. Patient agreed she would, stated \"okay\".    Writer noted again, based on what patient was stating, strongly advise calling 911.  Patient agreed she would do so herself. Call was ended.  Writer called back to patient about 10 minutes later to check to see if patient had called and no answer, went straight to [place].    Patient not answering phone after outreach attempts to make sure of plan and had called 911.  Patient and group home staff had been advised on recommendations to call 911 " "for SI, plan and intent to carry out plan.      Meche Mckeon RN  [facility]  Primary Care        Reason for Disposition    Patient is threatening suicide now    Suicide thoughts, threats, attempts, or questions    Patient is threatening suicide now    Answer Assessment - Initial Assessment Questions  1. CONCERN: \"What happened that made you call today?\"      Crying all night, recently found out 20 yr old sister is pregnant.   2. DEPRESSION SYMPTOM SCREENING: \"How are you feeling overall?\" (e.g., decreased energy, increased sleeping or difficulty sleeping, difficulty concentrating, feelings of sadness, guilt, hopelessness, or worthlessness)      Depressed, sad. Has not slept all night. Wants to harm self.  3. RISK OF HARM - SUICIDAL IDEATION:  \"Do you ever have thoughts of hurting or killing yourself?\"  (e.g., yes, no, no but preoccupation with thoughts about death)    - INTENT:  \"Do you have thoughts of hurting or killing yourself right NOW?\" (e.g., yes, no, N/A)    - PLAN: \"Do you have a specific plan for how you would do this?\" (e.g., gun, knife, overdose, no plan, N/A)      Yes, has thoughts of harming self, wants to hit self, beat self, choke self, \"I will do just about anything\".   4. RISK OF HARM - HOMICIDAL IDEATION:  \"Do you ever have thoughts of hurting or killing someone else?\"  (e.g., yes, no, no but preoccupation with thoughts about death)    - INTENT:  \"Do you have thoughts of hurting or killing someone right NOW?\" (e.g., yes, no, N/A)    - PLAN: \"Do you have a specific plan for how you would do this?\" (e.g., gun, knife, no plan, N/A)       Killing self, but no thoughts of harming others  5. FUNCTIONAL IMPAIRMENT: \"How have things been going for you overall? Have you had more difficulty than usual doing your normal daily activities?\"  (e.g., better, same, worse; self-care, school, work, interactions)      Not well  6. SUPPORT: \"Who is with you now?\" \"Who do you live " "with?\" \"Do you have family or friends who you can talk to?\"       Lives in a group home  7. THERAPIST: \"Do you have a counselor or therapist? Name?\"      No current therapist, but does have a psychiatrist  8. STRESSORS: \"Has there been any new stress or recent changes in your life?\"      Many  9. ALCOHOL USE OR SUBSTANCE USE (DRUG USE): \"Do you drink alcohol or use any illegal drugs?\"      No  10. OTHER: \"Do you have any other physical symptoms right now?\" (e.g., fever)        No  11. PREGNANCY: \"Is there any chance you are pregnant?\" \"When was your last menstrual period?\"        No    Answer Assessment - Initial Assessment Questions  1. MAIN CONCERN: \"What happened that made you call today?\"      Was in the ER last night for SI, depression  2. RISK OF HARM - SUICIDAL IDEATION:  \"Do you ever have thoughts of hurting or killing yourself?\"  (e.g., yes, no, no but preoccupation with thoughts about death)    - WISH TO BE DEAD:  \"Have you wished you were dead or wished you could go to sleep and not wake up?\"    - INTENT:  \"Have you had any thoughts of hurting or killing yourself?\" (e.g., yes, no, N/A) If Yes, ask: \"Are you having these thoughts about killing yourself right NOW?\"    - PLAN: \"Have you thought about how you might do this?\" \"Do you have a specific plan for how you would do this?\" (e.g., gun, knife, overdose, no plan, N/A)    - ACCESS: If yes to PLAN, \"Do you have access to ?\" (e.g., pills, gun in house, knife in kitchen)      Yes, wants to harm self, wants to kill self. Has plans, will do anything she can. Lives in a group home. Talks about beating self, punching self, choking self.  3. RISK OF HARM - SUICIDE ATTEMPT: \"Have you tried to harm yourself recently?\" If Yes, ask: When was this?\"        Did not answer  4. RISK OF HARM - SUICIDAL BEHAVIOR: \"Have you ever done anything, started to do anything, or prepared to do anything to end your life?\" (e.g., collected pills, bought a gun, wrote a suicide note, " "cut yourself, started but changed your mind)      Did not answer  5. EVENTS AND STRESSORS: \"Has there been any new stress or recent changes in your life?\" (e.g., death of loved one, homelessness, negative event, relationship breakup, work)      Recently found out younger sister is pregnant  6. FUNCTIONAL IMPAIRMENT: \"How have things been going for you overall? Have you had more difficulty than usual doing your normal daily activities?\"  (e.g., better, same, worse; self-care, school, work, interactions)      Yes, is not doing well. Lives in a group home  7. SUPPORT: \"Who is with you now?\" \"Who do you live with?\" \"Do you have family or friends who you can talk to?\"       Lives in group home, 2 staff members are present. Spoke with staff member, \"Yisel\". Staff member stated patient does this all the time. Threatens to harm self daily, has been in ED several times recently, calls 911 frequently.  8. THERAPIST: \"Do you have a counselor or therapist? Name?\"      No therapist currently, but has a psychiatrist  9. ALCOHOL USE OR SUBSTANCE USE (DRUG USE): \"Do you drink alcohol or use any illegal drugs?\"      No  10. OTHER: \"Do you have any other physical symptoms right now?\" (e.g., fever)        No  11. PREGNANCY or POSTPARTUM: \"Is there any chance you are pregnant?\" \"When was your last menstrual period?\" \"Were you recently pregnant?\" \"When did you give birth?\"        No    Protocols used: DEPRESSION-A-OH, SUICIDE [id number]      "

## 2022-08-11 NOTE — ED NOTES
Bed: ED04  Expected date: 7/31/21  Expected time: 4:36 PM  Means of arrival: Ambulance  Comments:  [place] [age]yo female, not feeling well    
Called pt's  staff, Arlene, informed about pt being discharged. She stated  staff will not come to get the pt instead to send pt by cab.   
alert and awake

## 2022-08-15 ENCOUNTER — NURSE TRIAGE (OUTPATIENT)
Dept: NURSING | Facility: CLINIC | Age: 22
End: 2022-08-15

## 2022-08-16 NOTE — TELEPHONE ENCOUNTER
"Caller is reporting I have suicidal ideation\".   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 She states she has  \"lots of issues\" and  Is not doing well.States \" My friend will be mad if she knows I called\"   Patient was recently seen in ED last week for  same and at that time had stopped her meds. Reports she is currently taking meds.    attempted to engage patient in triage but she hung up;   attempted to  call back and call  went to    Patient anwsered on second attempt  but hung up after  FNA identified self and then did not answer   Contacted  ( not at the )   She contacted house staff and they  state patient called and said she was on her way back and had  had a 'fight\" with  her friend\". Does not know where she is at currently     supervisor states she is not worried as this behavior is  typical for patient.  Patient  calls someone everyday: crisis lines, ambulances, ED's, clinics   and they are unsuccessful in altering her behavior   Discussed the numerous  calls with  complaints of SI that  legally obligate  [facility] to take action when she calls and threatens suicide. . Supervisor was unaware of these ramifications of her calls to [facility] and will  bring this to the attention of her care team   Patient is still not answering phone for  staff   Advised that [facility] will call back   to assure patient has arrived safely    Conference call with group home staff and patient as patient has not returned yet; patient states she is safe and expresses anger that   [facility] is  calling her   Patient informed of legal obligation  to assure of her safety after a call  threatening suicide is made.   patient angrily  Acknowledges understanding.   Heather Jane RN  [facility]     [phone]      Additional Information    Negative: Patient attempted suicide    Negative: Patient is threatening suicide now    Negative: Violent behavior, or threatening to physically hurt or " kill someone    Negative: [1] Patient is very confused (disoriented, slurred speech) AND [2] no other adult (e.g., friend or family member) available    Negative: [1] Difficult to awaken or acting very confused (disoriented, slurred speech) AND [2] new-onset    Negative: Sounds like a life-threatening emergency to the triager    Negative: Depression is main symptom and is not threatening suicide    Commented on: [1] Depression symptoms (sadness, hopelessness, decreased energy) AND [2] unable to do any normal activities (e.g., self care, school, work; in comparison to baseline).     Caller hung up and not answering returned  Phone call; [facility]eted [facility] and left # because  VM box full    Protocols used: SUICIDE [id number]

## 2022-08-16 NOTE — TELEPHONE ENCOUNTER
Nurse Triage SBAR    Is this a 2nd Level Triage? NO    Situation: Patient states she called earlier and was told if she didn't  the phone we would call the police.    Background: Patient has had suicidal thoughts.    Assessment: Patient states she has anxiety - she states she does not need the police.    She states she feels about the same as she did earlier.    She still feels suicidal, but she does not have a plan.    She lives in a group home. She states has someone with her at the group home.    She is not home right now. She is alone. She states she does not have anything with her that she could use to harm herself.    She states she does not have a plan to hurt herself right now.    She does not want the police called. She states she will call them herself if she needs them.    Protocol Recommended Disposition:   Home Care, See More Appropriate Guideline    Recommendation: Patient advised to call back if she feels like she is going to harm herself or if she has a plan.    Mercy Stevenson RN on 8/15/2022 at 8:14 PM         Reason for Disposition    Suicide thoughts, threats, attempts, or questions    Referral phone numbers for crisis intervention and depression, questions about    Additional Information    Negative: SEVERE difficulty breathing (e.g., struggling for each breath, speaks in single words)    Negative: Bluish (or gray) lips or face now    Negative: Difficult to awaken or acting confused (e.g., disoriented, slurred speech)    Negative: Hysterical or combative behavior    Negative: Sounds like a life-threatening emergency to the triager    Negative: Chest pain    Negative: Palpitations, skipped heart beat, or rapid heart beat    Negative: Cough is main symptom    Negative: Patient attempted suicide    Negative: Patient is threatening suicide now    Negative: Violent behavior, or threatening to physically hurt or kill someone    Negative: [1] Patient is very confused (disoriented, slurred  speech) AND [2] no other adult (e.g., friend or family member) available    Negative: [1] Difficult to awaken or acting very confused (disoriented, slurred speech) AND [2] new-onset    Negative: Sounds like a life-threatening emergency to the triager    Negative: Depression is main symptom and is not threatening suicide    Negative: [1] Depression symptoms (sadness, hopelessness, decreased energy) AND [2] unable to do any normal activities (e.g., self care, school, work; in comparison to baseline).    Negative: Patient sounds very sick or weak to the triager    Negative: [1] Patient is not threatening suicide now BUT [2] has a suicide PLAN (e.g., overdose, gunshot) and ACCESS (e.g., collecting pills, gun in house)    Negative: [1] Patient is not threatening suicide now BUT [2] had SUICIDAL BEHAVIORS in past 3 months    Negative: Depression symptoms (sadness, hopelessness, decreased energy) interfere with work or school    Negative: Sometimes has thoughts of suicide    Negative: Requesting to talk with a counselor (mental health worker, psychiatrist, etc.)    Negative: Patient is evasive or refuses to answer questions regarding intent to harm themselves    Negative: Substance use (drug use) or unhealthy alcohol use, known or suspected    Negative: Recent death of a loved one    Protocols used: ANXIETY AND PANIC ATTACK-A-AH, SUICIDE [id number]

## 2022-08-18 ENCOUNTER — NURSE TRIAGE (OUTPATIENT)
Dept: NURSING | Facility: CLINIC | Age: 22
End: 2022-08-18

## 2022-08-18 NOTE — TELEPHONE ENCOUNTER
"Pt reports \"straining\" to have a bowel movement. Had a bowel movement today. Pt states she does take a stool softener daily.    Advised pt to increase fluids and call back if straining persists or new concerns arise.    Pt verbalizes understanding and agrees to plan.     Reason for Disposition    MILD constipation    Additional Information    Negative: [1] Abdomen pain is main symptom AND [2] male    Negative: [1] Abdomen pain is main symptom AND [2] adult female    Negative: Rectal bleeding or blood in stool is main symptom    Negative: Rectal pain or itching is main symptom    Negative: Constipation in a cancer patient who is currently (or recently) receiving chemotherapy or radiation therapy, or cancer patient who has metastatic or end-stage cancer and is receiving palliative care    Negative: Patient sounds very sick or weak to the triager    Negative: [1] Vomiting AND [2] abdomen looks much more swollen than usual    Negative: [1] Vomiting AND [2] contains bile (green color)    Negative: [1] Constant abdominal pain AND [2] present > 2 hours    Negative: [1] Rectal pain or fullness from fecal impaction (rectum full of stool) AND [2] NOT better after SITZ bath, suppository or enema    Negative: [1] Intermittent mild abdominal pain AND [2] fever    Negative: Abdomen is more swollen than usual    Negative: Last bowel movement (BM) > 4 days ago    Negative: Leaking stool    Negative: Unable to have a bowel movement (BM) without manually removing stool (using finger to pull out stool or perform disimpaction)    Negative: Unable to have a bowel movement (BM) without laxative or enema    Negative: [1] Constipation persists > 1 week AND [2] no improvement after using CARE ADVICE    Negative: [1] Weight loss > 10 pounds (5 kg) AND [2] not dieting    Negative: Pencil-like, narrow stools    Negative: [1] Minor bleeding from rectum (e.g., blood just on toilet paper, few drops, streaks on surface of normal formed BM) AND [2] " 3 or more times    Negative: [1] Uses laxative (e.g., PEG / Miralax. Milk of magnesia) or enema AND [2] > once a month    Negative: Constipation is a chronic symptom (recurrent or ongoing AND present > 4 weeks)    Negative: Taking new prescription medication    Protocols used: CONSTIPATION-A-AH

## 2022-08-26 ENCOUNTER — HOSPITAL ENCOUNTER (EMERGENCY)
Facility: CLINIC | Age: 22
Discharge: HOME | End: 2022-08-26
Attending: EMERGENCY MEDICINE | Admitting: EMERGENCY MEDICINE
Payer: MEDICARE

## 2022-08-26 VITALS
HEART RATE: 95 BPM | RESPIRATION RATE: 16 BRPM | SYSTOLIC BLOOD PRESSURE: 123 MMHG | OXYGEN SATURATION: 99 % | DIASTOLIC BLOOD PRESSURE: 76 MMHG | TEMPERATURE: 98 F

## 2022-08-26 DIAGNOSIS — N93.9 VAGINAL BLEEDING: ICD-10-CM

## 2022-08-26 DIAGNOSIS — R30.0 DYSURIA: ICD-10-CM

## 2022-08-26 LAB
ANION GAP SERPL CALCULATED.3IONS-SCNC: 2 MMOL/L (ref 3–14)
B-HCG SERPL-ACNC: <1 IU/L (ref 0–5)
BASOPHILS # BLD AUTO: 0 10E3/UL (ref 0–0.2)
BASOPHILS NFR BLD AUTO: 1 %
BUN SERPL-MCNC: 14 MG/DL (ref 7–30)
CALCIUM SERPL-MCNC: 9.4 MG/DL (ref 8.5–10.1)
CHLORIDE BLD-SCNC: 111 MMOL/L (ref 94–109)
CLUE CELLS: ABNORMAL
CO2 SERPL-SCNC: 27 MMOL/L (ref 20–32)
CREAT SERPL-MCNC: 0.75 MG/DL (ref 0.52–1.04)
EOSINOPHIL # BLD AUTO: 0.2 10E3/UL (ref 0–0.7)
EOSINOPHIL NFR BLD AUTO: 2 %
ERYTHROCYTE [DISTWIDTH] IN BLOOD BY AUTOMATED COUNT: 12.8 % (ref 10–15)
GFR SERPL CREATININE-BSD FRML MDRD: >90 ML/MIN/1.73M2
GLUCOSE BLD-MCNC: 107 MG/DL (ref 70–99)
HCT VFR BLD AUTO: 38 % (ref 35–47)
HGB BLD-MCNC: 13.1 G/DL (ref 11.7–15.7)
HOLD SPECIMEN: NORMAL
IMM GRANULOCYTES # BLD: 0 10E3/UL
IMM GRANULOCYTES NFR BLD: 0 %
LYMPHOCYTES # BLD AUTO: 2.2 10E3/UL (ref 0.8–5.3)
LYMPHOCYTES NFR BLD AUTO: 28 %
MCH RBC QN AUTO: 29 PG (ref 26.5–33)
MCHC RBC AUTO-ENTMCNC: 34.5 G/DL (ref 31.5–36.5)
MCV RBC AUTO: 84 FL (ref 78–100)
MONOCYTES # BLD AUTO: 0.3 10E3/UL (ref 0–1.3)
MONOCYTES NFR BLD AUTO: 4 %
NEUTROPHILS # BLD AUTO: 5.2 10E3/UL (ref 1.6–8.3)
NEUTROPHILS NFR BLD AUTO: 65 %
NRBC # BLD AUTO: 0 10E3/UL
NRBC BLD AUTO-RTO: 0 /100
PLATELET # BLD AUTO: 225 10E3/UL (ref 150–450)
POTASSIUM BLD-SCNC: 4.2 MMOL/L (ref 3.4–5.3)
RBC # BLD AUTO: 4.51 10E6/UL (ref 3.8–5.2)
SODIUM SERPL-SCNC: 140 MMOL/L (ref 133–144)
TRICHOMONAS, WET PREP: ABNORMAL
WBC # BLD AUTO: 7.9 10E3/UL (ref 4–11)
WBC'S/HIGH POWER FIELD, WET PREP: ABNORMAL
YEAST, WET PREP: ABNORMAL

## 2022-08-26 PROCEDURE — 87491 CHLMYD TRACH DNA AMP PROBE: CPT | Performed by: EMERGENCY MEDICINE

## 2022-08-26 PROCEDURE — 80048 BASIC METABOLIC PNL TOTAL CA: CPT | Performed by: EMERGENCY MEDICINE

## 2022-08-26 PROCEDURE — 87591 N.GONORRHOEAE DNA AMP PROB: CPT | Performed by: EMERGENCY MEDICINE

## 2022-08-26 PROCEDURE — 36415 COLL VENOUS BLD VENIPUNCTURE: CPT | Performed by: EMERGENCY MEDICINE

## 2022-08-26 PROCEDURE — 87210 SMEAR WET MOUNT SALINE/INK: CPT | Performed by: EMERGENCY MEDICINE

## 2022-08-26 PROCEDURE — 99284 EMERGENCY DEPT VISIT MOD MDM: CPT | Performed by: EMERGENCY MEDICINE

## 2022-08-26 PROCEDURE — 85025 COMPLETE CBC W/AUTO DIFF WBC: CPT | Performed by: EMERGENCY MEDICINE

## 2022-08-26 PROCEDURE — 84702 CHORIONIC GONADOTROPIN TEST: CPT | Performed by: EMERGENCY MEDICINE

## 2022-08-26 ASSESSMENT — ACTIVITIES OF DAILY LIVING (ADL): ADLS_ACUITY_SCORE: 37

## 2022-08-26 NOTE — ED PROVIDER NOTES
"    [facility] [place] [place], [place]22      History     Chief Complaint   Patient presents with     Vaginal Bleeding     Onset 5 minutes prior to arrival, sudden onset of vaginal bleeding, \"I woke up with blood in my bed and abdominal pain.\"     HPI  Sadaf Ross is a 21 year old female who with a past medical history significant for borderline personality disorder, intellectual disability, and chronic suicidal ideation who presents to the Emergency Department for evaluation of vaginal bleeding. Patient states that she sudden has onset of heavy vaginal bleeding with clots. Patient states her last period was middle of the month last month July. Patient states she usually gets her period every month for one to two weeks long. Patient states she has not experienced flow this heavy recently but has had periods that have been this heavy in the past.  She reports irregular periods at baseline.  She reports the bleeding has since stopped.  She denies chance of pregnancy. Patient denies abdominal pain, cough, fever, shortness or breath, syncope.      Past Medical History  Past Medical History:   Diagnosis Date     ADHD (attention deficit hyperactivity disorder)      Bipolar 1 disorder (H)      Borderline personality disorder (H)      Depression      Depressive disorder      Intellectual disability      Obesity      Syncope      Past Surgical History:   Procedure Laterality Date     APPENDECTOMY       APPENDECTOMY       acetaminophen (TYLENOL) 325 MG tablet  alum & mag hydroxide-simethicone (MAALOX  ES) 400-400-40 MG/5ML SUSP suspension  ARIPiprazole lauroxil ER (ARISTADA) 882 MG/3.2ML intra-muscular  benztropine (COGENTIN) 1 MG tablet  calcium carbonate (TUMS) 500 MG chewable tablet  chlorhexidine (PERIDEX) 0.12 % solution  clobetasol (TEMOVATE) 0.05 % CREA cream  cyclobenzaprine (FLEXERIL) 10 MG tablet  diclofenac (VOLTAREN) 1 % topical gel  docusate sodium (COLACE) 100 MG " capsule  fluticasone (FLONASE) 50 MCG/ACT nasal spray  guaiFENesin (ROBITUSSIN) 100 MG/5ML SYRP  hydrocortisone (CORTAID) 0.5 % external cream  hydrOXYzine (ATARAX) 50 MG tablet  ibuprofen (ADVIL/MOTRIN) 400 MG tablet  loperamide (IMODIUM) 2 MG capsule  LORazepam (ATIVAN) 0.5 MG tablet  metoclopramide (REGLAN) 10 MG tablet  multivitamin, therapeutic (THERA-VIT) TABS tablet  omeprazole (PRILOSEC) 40 MG DR capsule  ondansetron (ZOFRAN) 4 MG tablet  polyethylene glycol (MIRALAX) 17 g packet  prochlorperazine (COMPAZINE) 10 MG tablet  sennosides (SENOKOT) 8.6 MG tablet  traZODone (DESYREL) 50 MG tablet  venlafaxine (EFFEXOR XR) 75 MG 24 hr capsule  Vitamin D, Cholecalciferol, 25 MCG (1000 UT) TABS      Allergies   Allergen Reactions     Penicillins Rash and Unknown     Family History  Family History   Problem Relation Age of Onset     Diabetes Type 1 Father      Cancer Paternal Grandfather      Social History   Social History     Tobacco Use     Smoking status: Current Every Day Smoker     Packs/day: 0.50     Years: 5.00     Pack years: 2.50     Types: Vaping Device     Smokeless tobacco: Never Used   Substance Use Topics     Alcohol use: No     Drug use: No      Past medical history, past surgical history, medications, allergies, family history, and social history were reviewed with the patient. No additional pertinent items.       Review of Systems  A complete review of systems was performed with pertinent positives and negatives noted in the HPI, and all other systems negative.    Physical Exam   BP: 133/86  Pulse: 90  Temp: 98.3  F (36.8  C)  Resp: 16  SpO2: 100 %  Physical Exam  General: awake, alert, NAD  Head: normal cephalic  HEENT: pupils equal, conjugate gaze intact  Neck: Supple  CV: regular rate and rhythm without murmur  Lungs: clear to auscultation  Abd: soft, non-tender, no guarding, no peritoneal signs  : Cervical os is closed.  There is no blood in the vaginal vault, no blood coming from the cervical  os.  EXT: lower extremities without swelling or edema  Neuro: awake, answers questions appropriately. No focal deficits noted       ED Course      Procedures        4:19PM  The patient was seen and examined by Bernardino Begum in Room ED20.   The medical record was reviewed and interpreted.  Current labs reviewed and interpreted.  Previous labs reviewed and interpreted.         Results for orders placed or performed during the hospital encounter of 08/26/22   Basic metabolic panel     Status: Abnormal   Result Value Ref Range    Sodium 140 133 - 144 mmol/L    Potassium 4.2 3.4 - 5.3 mmol/L    Chloride 111 (H) 94 - 109 mmol/L    Carbon Dioxide (CO2) 27 20 - 32 mmol/L    Anion Gap 2 (L) 3 - 14 mmol/L    Urea Nitrogen 14 7 - 30 mg/dL    Creatinine 0.75 0.52 - 1.04 mg/dL    Calcium 9.4 8.5 - 10.1 mg/dL    Glucose 107 (H) 70 - 99 mg/dL    GFR Estimate >90 >60 mL/min/1.73m2   HCG quantitative pregnancy     Status: Normal   Result Value Ref Range    hCG Quantitative <1 0 - 5 IU/L   CBC with platelets and differential     Status: None   Result Value Ref Range    WBC Count 7.9 4.0 - 11.0 10e3/uL    RBC Count 4.51 3.80 - 5.20 10e6/uL    Hemoglobin 13.1 11.7 - 15.7 g/dL    Hematocrit 38.0 35.0 - 47.0 %    MCV 84 78 - 100 fL    MCH 29.0 26.5 - 33.0 pg    MCHC 34.5 31.5 - 36.5 g/dL    RDW 12.8 10.0 - 15.0 %    Platelet Count 225 150 - 450 10e3/uL    % Neutrophils 65 %    % Lymphocytes 28 %    % Monocytes 4 %    % Eosinophils 2 %    % Basophils 1 %    % Immature Granulocytes 0 %    NRBCs per 100 WBC 0 <1 /100    Absolute Neutrophils 5.2 1.6 - 8.3 10e3/uL    Absolute Lymphocytes 2.2 0.8 - 5.3 10e3/uL    Absolute Monocytes 0.3 0.0 - 1.3 10e3/uL    Absolute Eosinophils 0.2 0.0 - 0.7 10e3/uL    Absolute Basophils 0.0 0.0 - 0.2 10e3/uL    Absolute Immature Granulocytes 0.0 <=0.4 10e3/uL    Absolute NRBCs 0.0 10e3/uL   Extra Tube     Status: None (In process)    Narrative    The following orders were created for panel order Extra  Tube.  Procedure                               Abnormality         Status                     ---------                               -----------         ------                     Extra Red Top Tube[[id number]]                               In process                   Please view results for these tests on the individual orders.   Wet prep     Status: Abnormal    Specimen: Vagina; Swab   Result Value Ref Range    Trichomonas Absent Absent    Yeast Absent Absent    Clue Cells Absent Absent    WBCs/high power field 1+ (A) None   CBC with platelets differential     Status: None    Narrative    The following orders were created for panel order CBC with platelets differential.  Procedure                               Abnormality         Status                     ---------                               -----------         ------                     CBC with platelets and d...[[id number]]                      Final result                 Please view results for these tests on the individual orders.     Medications - No data to display     Assessments & Plan (with Medical Decision Making)   Alona is a 21-year-old female who presents to the emergency department with vaginal bleeding.  Patient patient reports heavy vaginal bleeding just prior to arrival which has now stopped.    On exam she is well-appearing, nontoxic, normal vital signs and a benign abdominal exam.    Differential would include things such as pregnancy, miscarriage, ectopic pregnancy, abdomen dysfunctional uterine bleeding, menstrual cycle.    Initial evaluation will include laboratory studies to assess hemoglobin and a pelvic exam and a pregnancy test.    UPT negative ruling out miscarriage or ectopic pregnancy as a potential cause of her vaginal bleeding.    She has normal electrolytes, negative pregnancy test and a normal hemoglobin.  She has no bleeding on exam she has a completely benign abdominal exam.  At this point suspect this is dysfunctional  uterine bleeding and will refer her back to her OB/GYN.    I did  her on ER return precautions including soaking 2 pads an hour for greater than 2 hours in a row, syncope or near syncope symptoms.        I have reviewed the nursing notes. I have reviewed the findings, diagnosis, plan and need for follow up with the patient.    New Prescriptions    No medications on file       Final diagnoses:   Vaginal bleeding   I, Alona Humphrey, am serving as a trained medical scribe to document services personally performed by Bernardino Schwarz MD, based on the provider's statements to me.      I, Bernardino Schwarz MD, was physically present and have reviewed and verified the accuracy of this note documented by Alona Humphrey.    --  Bernardino Schwarz MD  [facility] EMERGENCY DEPARTMENT  8/26/2022     Bernardino Schwarz MD  08/26/22 2885

## 2022-08-26 NOTE — LETTER
September 2, 2022      Sadaf Ross  [address] [place] [address] [place] [address]        Dear ,    We are writing to inform you of your test results. Your urine culture was normal.  You do not have a urinary tract infection.         Resulted Orders   Urine Culture Aerobic Bacterial   Result Value Ref Range    Culture 10,000-50,000 CFU/mL Mixture of urogenital hansel        If you have any questions or concerns, please call the clinic at the number listed above.       Sincerely,      LINN Marcial CNP

## 2022-08-26 NOTE — DISCHARGE INSTRUCTIONS
Please return to the emergency department should you develop significant bleeding which would be soaking 2 pads an hour for 2 hours or more.  Or if you feel lightheaded or pass out.  Otherwise please follow-up with OB/GYN for your heavy vaginal bleeding.    Please make an appointment to follow up with OB/Gyn - [facility] (phone: [phone]) OR OB/Gyn - [facility] (phone: [phone]) for your heavy vaginal bleeding.

## 2022-08-26 NOTE — ED NOTES
"Pt presents from  with complaints of vaginal bleeding. Sudden onset, heavy bleeding, all over her bed and in the toilet. Pt was asked if it was her menstrual cycle time and she stated \"I don't know\". EMS asked pt when she last had her period and stated it was \"last month\". Pt is also reporting a 9/10 pain in vagina area. No comments of SI/HI to EMS.   "

## 2022-08-27 LAB
C TRACH DNA SPEC QL NAA+PROBE: NEGATIVE
N GONORRHOEA DNA SPEC QL NAA+PROBE: NEGATIVE

## 2022-08-30 ENCOUNTER — OFFICE VISIT (OUTPATIENT)
Dept: OBGYN | Facility: CLINIC | Age: 22
End: 2022-08-30
Attending: NURSE PRACTITIONER
Payer: MEDICARE

## 2022-08-30 VITALS
DIASTOLIC BLOOD PRESSURE: 85 MMHG | HEART RATE: 80 BPM | HEIGHT: 64 IN | SYSTOLIC BLOOD PRESSURE: 122 MMHG | BODY MASS INDEX: 40.63 KG/M2 | WEIGHT: 238 LBS

## 2022-08-30 DIAGNOSIS — R30.0 DYSURIA: ICD-10-CM

## 2022-08-30 DIAGNOSIS — E66.01 MORBID OBESITY (H): ICD-10-CM

## 2022-08-30 DIAGNOSIS — N92.0 SPOTTING: ICD-10-CM

## 2022-08-30 DIAGNOSIS — N92.4 EXCESSIVE BLEEDING IN PREMENOPAUSAL PERIOD: Primary | ICD-10-CM

## 2022-08-30 LAB
ALBUMIN UR-MCNC: NEGATIVE MG/DL
APPEARANCE UR: CLEAR
BILIRUB UR QL STRIP: NEGATIVE
COLOR UR AUTO: YELLOW
GLUCOSE UR STRIP-MCNC: NEGATIVE MG/DL
HGB UR QL STRIP: ABNORMAL
KETONES UR STRIP-MCNC: NEGATIVE MG/DL
LEUKOCYTE ESTERASE UR QL STRIP: ABNORMAL
NITRATE UR QL: NEGATIVE
PH UR STRIP: 6 [PH] (ref 5–8)
SP GR UR STRIP: 1.02 (ref 1–1.03)
UROBILINOGEN UR STRIP-ACNC: 0.2 E.U./DL

## 2022-08-30 PROCEDURE — G0463 HOSPITAL OUTPT CLINIC VISIT: HCPCS

## 2022-08-30 PROCEDURE — 99204 OFFICE O/P NEW MOD 45 MIN: CPT | Performed by: NURSE PRACTITIONER

## 2022-08-30 PROCEDURE — 87086 URINE CULTURE/COLONY COUNT: CPT

## 2022-08-30 PROCEDURE — 81003 URINALYSIS AUTO W/O SCOPE: CPT | Performed by: NURSE PRACTITIONER

## 2022-08-30 ASSESSMENT — PAIN SCALES - GENERAL: PAINLEVEL: EXTREME PAIN (8)

## 2022-08-30 NOTE — PROGRESS NOTES
Subjective:  Sadaf Ross is a 21 yr old female, , who presents to clinic today for ER follow-up and irregular periods. Pt's past medical history significant for borderline personality disorder, intellectual disability, and chronic suicidal ideation. She lives in a group home.     Menarche: 11 or 12 yrs old    Periods are typically monthly, although doesn't track exact cycle length. Bleeding usually lasts for 7 days.  Typically light to moderate bleeding, needing to change a pad/ tampon 2-3 times per day.   Significant dysmenorrhea leading to nausea and vomiting.  Before periods come on, notices mood changes and becoming violent to self and others.  Used to take a birth control pills, but had a hard time remembering to take this; currently on the depo provera injection.  Last injection was 2022, receives this at [facility].  Denies having worsening mental health symptoms since starting depo provera injections.     She presented to the Emergency Department for evaluation of vaginal bleeding. She reported a sudden onset of heavy vaginal bleeding with clots the size of a quarter.  Noticed mood changes before the onset of bleeding. This bleeding lasted for 1-2 hours and then it spontaneously resolved. Presented to the ED, but bleeding had stopped by that time.    Patient is unsure when her last period was, prior to this bleeding episode.  Patient states she has not experienced flow this heavy recently but has had periods that have been this heavy in the past.      2022:   Neg gonorrhea & chlamydia tests  Neg wet prep  UPT neg  CBC WNL    2022:  TSH WNL    Sexually active with male partners; ~5 partners in the last 6 months.  Denies pain with intercourse. Never had an STD in the past.      Also reports burning with urination and increase in urinary frequency. Denies leaking of urine or hematuria.  Denies fever or flank pain.     Denies pelvic pain; denies change in vaginal discharge, vaginal itching, or  odor.      Last pap smear: 3/31/2022 NIL, HPV negative    Social Hx: Graduated high school; spends her day sleeping and going to appointments.  Has a caregiver at her group home.      Pt declined PHQ-9 and RAMON-7 screenings today    Past Medical History:   Diagnosis Date     ADHD (attention deficit hyperactivity disorder)      Bipolar 1 disorder (H)      Borderline personality disorder (H)      Depression      Depressive disorder      Intellectual disability      Obesity      Syncope      Past Surgical History:   Procedure Laterality Date     APPENDECTOMY       APPENDECTOMY       Family History   Problem Relation Age of Onset     Diabetes Type 1 Father      Cancer Paternal Grandfather      Current Outpatient Medications   Medication     acetaminophen (TYLENOL) 325 MG tablet     alum & mag hydroxide-simethicone (MAALOX  ES) 400-400-40 MG/5ML SUSP suspension     ARIPiprazole lauroxil ER (ARISTADA) 882 MG/3.2ML intra-muscular     benztropine (COGENTIN) 1 MG tablet     calcium carbonate (TUMS) 500 MG chewable tablet     chlorhexidine (PERIDEX) 0.12 % solution     clobetasol (TEMOVATE) 0.05 % CREA cream     cyclobenzaprine (FLEXERIL) 10 MG tablet     diclofenac (VOLTAREN) 1 % topical gel     docusate sodium (COLACE) 100 MG capsule     fluticasone (FLONASE) 50 MCG/ACT nasal spray     guaiFENesin (ROBITUSSIN) 100 MG/5ML SYRP     hydrocortisone (CORTAID) 0.5 % external cream     hydrOXYzine (ATARAX) 50 MG tablet     ibuprofen (ADVIL/MOTRIN) 400 MG tablet     loperamide (IMODIUM) 2 MG capsule     LORazepam (ATIVAN) 0.5 MG tablet     metoclopramide (REGLAN) 10 MG tablet     multivitamin, therapeutic (THERA-VIT) TABS tablet     omeprazole (PRILOSEC) 40 MG DR capsule     ondansetron (ZOFRAN) 4 MG tablet     polyethylene glycol (MIRALAX) 17 g packet     prochlorperazine (COMPAZINE) 10 MG tablet     sennosides (SENOKOT) 8.6 MG tablet     traZODone (DESYREL) 50 MG tablet     venlafaxine (EFFEXOR XR) 75 MG 24 hr capsule     Vitamin  "D, Cholecalciferol, 25 MCG (1000 UT) TABS     No current facility-administered medications for this visit.        Allergies   Allergen Reactions     Penicillins Rash and Unknown     Objective:  /85   Pulse 80   Ht 1.626 m (5' 4\")   Wt 108 kg (238 lb)   LMP 08/25/2022   Breastfeeding No   BMI 40.85 kg/m    General: pleasant female in no acute distress  Psych: normal mentation, well oriented  Respiratory:  Unlabored breathing  Musculoskeletal: no gross deformities  Pelvic exam deferred as she had a normal  exam in the ED.     UA RESULTS:  Recent Labs   Lab Test 08/30/22  1430 06/04/22 2018   COLOR Yellow Light Yellow   APPEARANCE Clear Slightly Cloudy*   URINEGLC Negative Negative   URINEBILI Negative Negative   URINEKETONE Negative Negative   SG 1.025 1.022   UBLD Trace* Trace*   URINEPH 6.0 6.0   PROTEIN Negative Negative   UROBILINOGEN 0.2  --    NITRITE Negative Negative   LEUKEST Trace* Large*   RBCU  --  4*   WBCU  --  7*         Assessment:  Encounter Diagnoses   Name Primary?     Excessive bleeding in premenopausal period Yes     Spotting      Morbid obesity (H)      Dysuria      Plan:   Sadaf is a 21 yr old female who presented to the ED with heavy bleeding x ~2 hours; she is currently receiving depo provera injections.  Gonorrhea, chlamydia, and wet prep tests all normal. UPT negative. Counseled Sadaf that irregular bleeding is a side effect of the depo provera injections. It is also possible that this bleeding episode was regularly timed for her, as she cannot recall when her LMP was.  Recommended she consider Nexplanon given that she continues to have mood changes premenstrually, dysmenorrhea, and 7 day cycles.   Recommend pelvic US to evaluate for structural cause of her bleeding with follow-up appointment directly following to review results.   UC sent to the lab to rule out UTI as cause of urinary symptoms.    Pt expressed understanding and agreement with the plan for care.  Chloe" Nida, DNP, APRN, WHNP

## 2022-08-30 NOTE — LETTER
2022       RE: Sadaf Ross  [address] [place] [address] [place] [address]     Dear Colleague,    Thank you for referring your patient, Sadaf Ross, to the [facility]. Please see a copy of my visit note below.    Subjective:  Sadaf Ross is a 21 yr old female, , who presents to clinic today for ER follow-up and irregular periods. Pt's past medical history significant for borderline personality disorder, intellectual disability, and chronic suicidal ideation. She lives in a group home.     Menarche: 11 or 12 yrs old    Periods are typically monthly, although doesn't track exact cycle length. Bleeding usually lasts for 7 days.  Typically light to moderate bleeding, needing to change a pad/ tampon 2-3 times per day.   Significant dysmenorrhea leading to nausea and vomiting.  Before periods come on, notices mood changes and becoming violent to self and others.  Used to take a birth control pills, but had a hard time remembering to take this; currently on the depo provera injection.  Last injection was 2022, receives this at [facility].  Denies having worsening mental health symptoms since starting depo provera injections.     She presented to the Emergency Department for evaluation of vaginal bleeding. She reported a sudden onset of heavy vaginal bleeding with clots the size of a quarter.  Noticed mood changes before the onset of bleeding. This bleeding lasted for 1-2 hours and then it spontaneously resolved. Presented to the ED, but bleeding had stopped by that time.    Patient is unsure when her last period was, prior to this bleeding episode.  Patient states she has not experienced flow this heavy recently but has had periods that have been this heavy in the past.      2022:   Neg gonorrhea & chlamydia tests  Neg wet prep  UPT neg  CBC WNL    2022:  TSH WNL    Sexually active with male partners;  ~5 partners in the last 6 months.  Denies pain with intercourse. Never had an STD in the past.      Also reports burning with urination and increase in urinary frequency. Denies leaking of urine or hematuria.  Denies fever or flank pain.     Denies pelvic pain; denies change in vaginal discharge, vaginal itching, or odor.      Last pap smear: 3/31/2022 NIL, HPV negative    Social Hx: Graduated high school; spends her day sleeping and going to appointments.  Has a caregiver at her group home.      Pt declined PHQ-9 and RAMON-7 screenings today    Past Medical History:   Diagnosis Date     ADHD (attention deficit hyperactivity disorder)      Bipolar 1 disorder (H)      Borderline personality disorder (H)      Depression      Depressive disorder      Intellectual disability      Obesity      Syncope      Past Surgical History:   Procedure Laterality Date     APPENDECTOMY       APPENDECTOMY       Family History   Problem Relation Age of Onset     Diabetes Type 1 Father      Cancer Paternal Grandfather      Current Outpatient Medications   Medication     acetaminophen (TYLENOL) 325 MG tablet     alum & mag hydroxide-simethicone (MAALOX  ES) 400-400-40 MG/5ML SUSP suspension     ARIPiprazole lauroxil ER (ARISTADA) 882 MG/3.2ML intra-muscular     benztropine (COGENTIN) 1 MG tablet     calcium carbonate (TUMS) 500 MG chewable tablet     chlorhexidine (PERIDEX) 0.12 % solution     clobetasol (TEMOVATE) 0.05 % CREA cream     cyclobenzaprine (FLEXERIL) 10 MG tablet     diclofenac (VOLTAREN) 1 % topical gel     docusate sodium (COLACE) 100 MG capsule     fluticasone (FLONASE) 50 MCG/ACT nasal spray     guaiFENesin (ROBITUSSIN) 100 MG/5ML SYRP     hydrocortisone (CORTAID) 0.5 % external cream     hydrOXYzine (ATARAX) 50 MG tablet     ibuprofen (ADVIL/MOTRIN) 400 MG tablet     loperamide (IMODIUM) 2 MG capsule     LORazepam (ATIVAN) 0.5 MG tablet     metoclopramide (REGLAN) 10 MG tablet     multivitamin, therapeutic (THERA-VIT)  "TABS tablet     omeprazole (PRILOSEC) 40 MG DR capsule     ondansetron (ZOFRAN) 4 MG tablet     polyethylene glycol (MIRALAX) 17 g packet     prochlorperazine (COMPAZINE) 10 MG tablet     sennosides (SENOKOT) 8.6 MG tablet     traZODone (DESYREL) 50 MG tablet     venlafaxine (EFFEXOR XR) 75 MG 24 hr capsule     Vitamin D, Cholecalciferol, 25 MCG (1000 UT) TABS     No current facility-administered medications for this visit.        Allergies   Allergen Reactions     Penicillins Rash and Unknown     Objective:  /85   Pulse 80   Ht 1.626 m (5' 4\")   Wt 108 kg (238 lb)   LMP 08/25/2022   Breastfeeding No   BMI 40.85 kg/m    General: pleasant female in no acute distress  Psych: normal mentation, well oriented  Respiratory:  Unlabored breathing  Musculoskeletal: no gross deformities  Pelvic exam deferred as she had a normal  exam in the ED.     UA RESULTS:  Recent Labs   Lab Test 08/30/22  1430 06/04/22 2018   COLOR Yellow Light Yellow   APPEARANCE Clear Slightly Cloudy*   URINEGLC Negative Negative   URINEBILI Negative Negative   URINEKETONE Negative Negative   SG 1.025 1.022   UBLD Trace* Trace*   URINEPH 6.0 6.0   PROTEIN Negative Negative   UROBILINOGEN 0.2  --    NITRITE Negative Negative   LEUKEST Trace* Large*   RBCU  --  4*   WBCU  --  7*         Assessment:  Encounter Diagnoses   Name Primary?     Excessive bleeding in premenopausal period Yes     Spotting      Morbid obesity (H)      Dysuria      Plan:   Sadaf is a 21 yr old female who presented to the ED with heavy bleeding x ~2 hours; she is currently receiving depo provera injections.  Gonorrhea, chlamydia, and wet prep tests all normal. UPT negative. Counseled Sadaf that irregular bleeding is a side effect of the depo provera injections. It is also possible that this bleeding episode was regularly timed for her, as she cannot recall when her LMP was.  Recommended she consider Nexplanon given that she continues to have mood changes " premenstrually, dysmenorrhea, and 7 day cycles.   Recommend pelvic US to evaluate for structural cause of her bleeding with follow-up appointment directly following to review results.   UC sent to the lab to rule out UTI as cause of urinary symptoms.    Pt expressed understanding and agreement with the plan for care.  Chloe Alva, ECHO, APRN, WHNP

## 2022-08-30 NOTE — NURSING NOTE
Chief Complaint   Patient presents with     Establish Care     C/O menstrual issues   Nanci Fraga LPN

## 2022-08-31 ENCOUNTER — NURSE TRIAGE (OUTPATIENT)
Dept: PEDIATRICS | Facility: CLINIC | Age: 22
End: 2022-08-31

## 2022-08-31 ENCOUNTER — TELEPHONE (OUTPATIENT)
Dept: PEDIATRICS | Facility: CLINIC | Age: 22
End: 2022-08-31

## 2022-08-31 NOTE — TELEPHONE ENCOUNTER
Received call from pt   She continues to vomit and does not feel well    Pt lives in a group home    Advised to pt to be seen in the ER for evaluation    Thank you  Todd Ghosh RN on 8/31/2022 at 12:30 PM

## 2022-08-31 NOTE — TELEPHONE ENCOUNTER
"Patient calling as she has thrown up a couple times this morning. She has been experiencing these symptoms for a couple weeks now and is looking to reschedule her imaging appointment that was canceled for 9/7. [facility] wanting the imagine done prior to meeting.     Patient has thrown up 2 times this morning, has not tried to eat anything yet but usually is unable to keep anything down. Denies right sided abdominal pain and says it is mainly in her lower middle abdomen. No diarrhea, fevers, etc.     Gave patient imaging number for the hospital and connected with central scheduling to connect patient with  at hospital.    Karen Vigil RN    Reason for Disposition    Vomiting is a chronic symptom (recurrent or ongoing AND lasting > 4 weeks)    Answer Assessment - Initial Assessment Questions  1. VOMITING SEVERITY: \"How many times have you vomited in the past 24 hours?\"      - MILD:  1 - 2 times/day     - MODERATE: 3 - 5 times/day, decreased oral intake without significant weight loss or symptoms of dehydration     - SEVERE: 6 or more times/day, vomits everything or nearly everything, with significant weight loss, symptoms of dehydration       Mild  2. ONSET: \"When did the vomiting begin?\"       Ongoing. Started a couple weeks ago. Pretty consistent  3. FLUIDS: \"What fluids or food have you vomited up today?\" \"Have you been able to keep any fluids down?\"      Havent eaten yet. Usually cant keep anything down.  4. ABDOMINAL PAIN: \"Are your having any abdominal pain?\" If yes : \"How bad is it and what does it feel like?\" (e.g., crampy, dull, intermittent, constant)       Yes. Sharp pain in the lower middle abdomen  5. DIARRHEA: \"Is there any diarrhea?\" If Yes, ask: \"How many times today?\"       no  6. CONTACTS: \"Is there anyone else in the family with the same symptoms?\"       no  7. CAUSE: \"What do you think is causing your vomiting?\"      dont know  8. HYDRATION STATUS: \"Any signs of dehydration?\" (e.g., dry " "mouth [not only dry lips], too weak to stand) \"When did you last urinate?\"      This moring  9. OTHER SYMPTOMS: \"Do you have any other symptoms?\" (e.g., fever, headache, vertigo, vomiting blood or coffee grounds, recent head injury)      None. Emesis is phlemy   10. PREGNANCY: \"Is there any chance you are pregnant?\" \"When was your last menstrual period?\"        No. Last period was a week ago    Protocols used: VOMITING-A-OH      "

## 2022-09-01 LAB — BACTERIA UR CULT: NORMAL

## 2022-09-06 ENCOUNTER — TELEPHONE (OUTPATIENT)
Dept: NURSING | Facility: CLINIC | Age: 22
End: 2022-09-06

## 2022-09-07 ENCOUNTER — TELEPHONE (OUTPATIENT)
Dept: OBGYN | Facility: CLINIC | Age: 22
End: 2022-09-07

## 2022-09-07 ENCOUNTER — NURSE TRIAGE (OUTPATIENT)
Dept: NURSING | Facility: CLINIC | Age: 22
End: 2022-09-07

## 2022-09-07 NOTE — TELEPHONE ENCOUNTER
"Patient calling tonight due to \"causing chaos in my group home\" and is unsure what to do, she states she is slamming doors and screaming. Patient states she can get a device because she has something more important to do. Patient states that she is thinking about killing herself but does not have a plan. Nurse spoke with the patient that if she is willing to be evaluated in the ED nurse can help the patient with an ambulance ride and patient hung up. Nurse tried to call back patient three time with no success. Nurse called group home number and got a hold of Arlene NIEVES who is not at the group home but states there are two care givers there controlling her and the patient has reached out to Arlene and she told her to calm down. Arlene states this is the patient's normal behavior and she wants to get back to [place] as she thinks she is a  there. Arlene was going to reach out to the group home regarding the patient and the concerns she made to the nurse. Nurse tried to reach the patient again without success.     Nurse jones with [facility] called an ambulance for patient due to hanging up and no success at contacting the patient to make sure she was okay.     Yoana Wiggins RN on 9/6/2022 at 9:01 PM              "

## 2022-09-07 NOTE — TELEPHONE ENCOUNTER
Patient calling reporting more than 6 episodes of vomiting today, Reports some clammy skin today as well. Denies rapid pulse, history of diabetes and confusion. Advised per protocol to be seen in urgent care today with patient agreeable to the plan.    Day Caldera RN 09/07/22 4:14 PM   [facility] Triage Nurse Advisor      Reason for Disposition    SEVERE vomiting (e.g., 6 or more times/day)  (Exception: Patient sounds well, is drinking liquids, does not sound dehydrated, and vomiting has lasted less than 24 hours.)    Additional Information    Negative: Shock suspected (e.g., cold/pale/clammy skin, too weak to stand, low BP, rapid pulse)    Negative: Difficult to awaken or acting confused (e.g., disoriented, slurred speech)    Negative: Sounds like a life-threatening emergency to the triager    Negative: Vomiting occurs only while coughing    Negative: Pregnant < 20 Weeks and nausea/vomiting began in early pregnancy (i.e., 4-8 weeks pregnant)    Negative: Chest pain    Negative: Headache is main symptom    Negative: Vomiting red blood or black (coffee ground) material    Negative: Vomiting and hernia is more painful or swollen than usual    Negative: Recent head injury (within 3 days)    Negative: Recent abdominal injury (within 7 days)    Negative: Insulin-dependent diabetes and glucose > 240 mg/dL (13 mmol/L)    Negative: Severe pain in one eye    Protocols used: VOMITING-A-OH

## 2022-09-07 NOTE — TELEPHONE ENCOUNTER
[facility] Call Center    Phone Message    May a detailed message be left on voicemail: yes     Reason for Call: Symptoms or Concerns     If patient has red-flag symptoms, warm transfer to triage line    Current symptom or concern: Still currently bleeding and having stomach pains    Symptoms have been present for:  6 day(s)    Has patient previously been seen for this? Yes    By Chloe Alva NP    Date: 8/30/22    Are there any new or worsening symptoms? Yes: worsening      Action Taken: Other: whs    Travel Screening: Not Applicable

## 2022-09-07 NOTE — TELEPHONE ENCOUNTER
Per Yoana ARROYO's request 911 was called for patients safety.    Megan Lopez RN Nursing Advisor 9/6/2022 8:57 PM

## 2022-09-08 NOTE — TELEPHONE ENCOUNTER
Patient called emergency RN line very upset and states she canceled her appt with KAPIL Alva today and GI Ultrasound.     Discussed with patient what her concerns are, patient states she was vomiting yesterday x6 and called the triage line they told her to proceed to urgent care but she did not go.    Patient states today she is feeling better, able to keep fluids down and drinking water. Encouraged patient to increase fluid intake and gatorade/electrolytes. She is not bleeding (no recent period). Does not know when her period was, not accurate historian.     Encouraged patient to reschedule GI Ultrasound to see what is going on, and to reschedule with KAPIL Alva to follow up after.     Patient agreeable to plan, will call back RN line if any other symptoms arise. Phone number given to schedule.

## 2022-09-09 ENCOUNTER — NURSE TRIAGE (OUTPATIENT)
Dept: NURSING | Facility: CLINIC | Age: 22
End: 2022-09-09

## 2022-09-09 NOTE — TELEPHONE ENCOUNTER
It is okay to leave a detailed message at this number.     Nurse Triage SBAR    Is this a 2nd Level Triage? NO    Situation:   Got back from boyfriends house, ate lunch, and the 15min after lunch she puked it all up     Background:   She has had could for the past 1-2 weeks, she is coughing so hard that she pukes.     Assessment:   - She has had 3 emesis , all were today after lunch  -no chest pain  -no SOB  - no fever (97.1)  - anxiety is high because of her symptoms  - lower abdominal pain   - headache 4/10 - intermittent   -smokes cigarettes (no recent changes)  - decreased PO intake, isn't able to keep fluids down, but is still making good urine  - puke is white and foamy, no green bile  -abdominal pain 5/10, has been going on for the past 3-4 hours    Protocol Recommended Disposition:   See HCP Within 4 Hours (Or PCP Triage), See PCP Within 24 Hours, See More Appropriate Guideline    Recommendation:   Go to   - or call PCP to speak to care team (PCP is at [facility] clinic)  - She states she is unable to walk to , it is to cold per patient  - she has no one to drive to   - she states she will call her sister at work to make a plan with her  - we discussed UC options      Reason for Disposition    Vomiting occurs only while coughing    SEVERE coughing spells (e.g., whooping sound after coughing, vomiting after coughing)    [1] Constant abdominal pain AND [2] present > 2 hours    Additional Information    Negative: Difficult to awaken or acting confused (e.g., disoriented, slurred speech)    Negative: Shock suspected (e.g., cold/pale/clammy skin, too weak to stand, low BP, rapid pulse)    Negative: Sounds like a life-threatening emergency to the triager    Negative: [1] Pregnant < 20 Weeks AND [2] nausea/vomiting began in early pregnancy (i.e., 4-8 weeks pregnant)    Negative: Chest pain    Negative: Headache is main symptom    Negative: SEVERE difficulty breathing (e.g., struggling for each breath, speaks in  "single words)    Negative: Bluish (or gray) lips or face now    Negative: [1] Difficulty breathing AND [2] exposure to flames, smoke, or fumes    Negative: [1] Stridor AND [2] difficulty breathing    Negative: [1] Previous asthma attacks AND [2] this feels like asthma attack    Negative: Sounds like a life-threatening emergency to the triager    Negative: Dry cough (non-productive;  no sputum or minimal clear sputum)    Negative: [1] MODERATE difficulty breathing (e.g., speaks in phrases, SOB even at rest, pulse 100-120) AND [2] still present when not coughing    Negative: Chest pain  (Exception: MILD central chest pain, present only when coughing)    Negative: Patient sounds very sick or weak to the triager    Negative: [1] MILD difficulty breathing (e.g., minimal/no SOB at rest, SOB with walking, pulse <100) AND [2] still present when not coughing    Negative: [1] Coughed up blood AND [2] > 1 tablespoon (15 ml)  (Exception: Blood-tinged sputum.)    Negative: Fever > 103 F (39.4 C)    Negative: [1] Fever > 101 F (38.3 C) AND [2] age > 60 years    Negative: [1] Fever > 100.0 F (37.8 C) AND [2] bedridden (e.g., nursing home patient, CVA, chronic illness, recovering from surgery)    Negative: [1] Fever > 100.0 F (37.8 C) AND [2] diabetes mellitus or weak immune system (e.g., HIV positive, cancer chemo, splenectomy, organ transplant, chronic steroids)    Negative: Wheezing is present    Negative: Vomiting (or Nausea) in a cancer patient who is currently (or recently) receiving chemotherapy or radiation therapy, or cancer patient who has metastatic or end-stage cancer and is receiving palliative care    Negative: [1] Vomiting AND [2] contains red blood or black (\"coffee ground\") material  (Exception: few red streaks in vomit that only happened once)    Negative: Severe pain in one eye    Negative: Recent head injury (within last 3 days)    Negative: Recent abdominal injury (within last 3 days)    Negative: [1] " Insulin-dependent diabetes (Type I) AND [2] glucose > 400 mg/dl (22 mmol/l)    Negative: [1] Vomiting AND [2] hernia is more painful or swollen than usual    Negative: [1] MODERATE vomiting (e.g., 3 - 5 times/day) AND [2] age > 60 years    Negative: [1] SEVERE vomiting (e.g., 6 or more times/day) AND [2] present > 8 hours (Exception: patient sounds well, is drinking liquids, does not sound dehydrated, and vomiting has lasted less than 24 hours)    Negative: Severe headache (e.g., excruciating) (Exception: similar to previous migraines)    Negative: High-risk adult (e.g., diabetes mellitus, brain tumor, V-P shunt, hernia)    Negative: [1] Drinking very little AND [2] dehydration suspected (e.g., no urine > 12 hours, very dry mouth, very lightheaded)    Negative: Patient sounds very sick or weak to the triager    Negative: [1] Vomiting AND [2] abdomen looks much more swollen than usual    Negative: [1] Vomiting AND [2] contains bile (green color)    Protocols used: VOMITING-A-AH, COUGH - ACUTE [id number]    GLORIA ARROYO RN on 9/9/2022 at 5:11 PM

## 2022-09-11 ENCOUNTER — NURSE TRIAGE (OUTPATIENT)
Dept: NURSING | Facility: CLINIC | Age: 22
End: 2022-09-11

## 2022-09-11 NOTE — TELEPHONE ENCOUNTER
PCP: [facility]  She wants to know if she needs to come in, she feels sick. I want to know why I am always getting sick ?.  I have a cough which started a couple of days ago. I have chills, I always have chillls. T97.3 forehead  Her cough is productive of white phlegm. Nasal congestion.   Triaged to a disposition of Home care: given per guideline. Advised patient to contact her clinic and arrange appointment to discuss above with her PCP.     Nya Banegas RN Triage Nurse Advisor 5:23 PM 9/11/2022  Reason for Disposition    Cough    Additional Information    Negative: SEVERE difficulty breathing (e.g., struggling for each breath, speaks in single words)    Negative: Bluish (or gray) lips or face now    Negative: [1] Difficulty breathing AND [2] exposure to flames, smoke, or fumes    Negative: [1] Stridor AND [2] difficulty breathing    Negative: Sounds like a life-threatening emergency to the triager    Negative: [1] Previous asthma attacks AND [2] this feels like asthma attack    Negative: Dry cough (non-productive;  no sputum or minimal clear sputum)    Negative: Chest pain  (Exception: MILD central chest pain, present only when coughing)    Negative: [1] MODERATE difficulty breathing (e.g., speaks in phrases, SOB even at rest, pulse 100-120) AND [2] still present when not coughing    Negative: Patient sounds very sick or weak to the triager    Negative: [1] MILD difficulty breathing (e.g., minimal/no SOB at rest, SOB with walking, pulse <100) AND [2] still present when not coughing    Negative: [1] Coughed up blood AND [2] > 1 tablespoon (15 ml)  (Exception: Blood-tinged sputum.)    Negative: Fever > 103 F (39.4 C)    Negative: [1] Fever > 101 F (38.3 C) AND [2] age > 60 years    Negative: [1] Fever > 100.0 F (37.8 C) AND [2] bedridden (e.g., nursing home patient, CVA, chronic illness, recovering from surgery)    Negative: [1] Fever > 100.0 F (37.8 C) AND [2] diabetes mellitus or weak immune system (e.g.,  HIV positive, cancer chemo, splenectomy, organ transplant, chronic steroids)    Negative: Wheezing is present    Negative: SEVERE coughing spells (e.g., whooping sound after coughing, vomiting after coughing)    Negative: [1] Continuous (nonstop) coughing interferes with work or school AND [2] no improvement using cough treatment per Care Advice    Negative: Coughing up kenneth-colored (reddish-brown) sputum    Negative: Fever present > 3 days (72 hours)    Negative: [1] Fever returns after gone for over 24 hours AND [2] symptoms worse or not improved    Negative: [1] Using nasal washes and pain medicine > 24 hours AND [2] sinus pain (around cheekbone or eye) persists    Negative: Earache    Negative: [1] Known COPD or other severe lung disease (i.e., bronchiectasis, cystic fibrosis, lung surgery) AND [2] worsening symptoms (i.e., increased sputum purulence or amount, increased breathing difficulty    Negative: Cough has been present for > 3 weeks    Negative: [1] Nasal discharge AND [2] present > 10 days    Negative: [1] Coughed up blood-tinged sputum AND [2] more than once    Negative: Exposure to TB (Tuberculosis)    Protocols used: COUGH - ACUTE [id number]

## 2022-09-12 ENCOUNTER — NURSE TRIAGE (OUTPATIENT)
Dept: NURSING | Facility: CLINIC | Age: 22
End: 2022-09-12

## 2022-09-12 NOTE — TELEPHONE ENCOUNTER
"  S-(situation): Call from patient who says she just got home from doctor's appt (follow-up appt) and had \"passed out\" at the clinic. Later on stated she had trouble keeping her eyes open. Patient says there was no intervention at the clinic.    Patient says she still feels a bit shaky but is able to stand on her own.  She reports she was sick w/ cough and vomited 5 times last night.  Patient reports she has been nauseas for 1-2 week. No vomiting today.    Re: cough  Cough started yesterday.  Patient denies fever or shortness of breath.  She reports chest pain at 4/10; usually 7-8/10; says the chest pain has be going on for many years and cause is unknown.    B-(background):   Sees Dr. Wilkins at [facility]  Lives at a group home  Spoke to triage last night    A-(assessment): home care      R-(recommendations): home care. Advised patient to monitor her symptoms and call back if needed.    Reviewed care advice with caller per RN triage protocol guideline.  Advised to call back with worsening symptoms, concerns or questions.   Caller verbalized understanding.          Debi Barnes RN/[facility]      "

## 2022-09-12 NOTE — TELEPHONE ENCOUNTER
Reason for Disposition    Cough    Additional Information    Negative: SEVERE difficulty breathing (e.g., struggling for each breath, speaks in single words)    Negative: Bluish (or gray) lips or face now    Negative: [1] Difficulty breathing AND [2] exposure to flames, smoke, or fumes    Negative: [1] Stridor AND [2] difficulty breathing    Negative: Sounds like a life-threatening emergency to the triager    Negative: [1] MODERATE difficulty breathing (e.g., speaks in phrases, SOB even at rest, pulse 100-120) AND [2] still present when not coughing    Negative: Chest pain  (Exception: MILD central chest pain, present only when coughing)     Not new    Negative: Patient sounds very sick or weak to the triager    Negative: [1] MILD difficulty breathing (e.g., minimal/no SOB at rest, SOB with walking, pulse <100) AND [2] still present when not coughing    Negative: [1] Coughed up blood AND [2] > 1 tablespoon (15 ml)  (Exception: Blood-tinged sputum.)    Negative: Fever > 103 F (39.4 C)    Negative: [1] Fever > 101 F (38.3 C) AND [2] age > 60 years    Negative: [1] Fever > 100.0 F (37.8 C) AND [2] bedridden (e.g., nursing home patient, CVA, chronic illness, recovering from surgery)    Negative: [1] Fever > 100.0 F (37.8 C) AND [2] diabetes mellitus or weak immune system (e.g., HIV positive, cancer chemo, splenectomy, organ transplant, chronic steroids)    Negative: Wheezing is present    Negative: SEVERE coughing spells (e.g., whooping sound after coughing, vomiting after coughing)    Negative: [1] Continuous (nonstop) coughing interferes with work or school AND [2] no improvement using cough treatment per Care Advice    Negative: Coughing up kenneth-colored (reddish-brown) sputum    Negative: Fever present > 3 days (72 hours)    Negative: [1] Fever returns after gone for over 24 hours AND [2] symptoms worse or not improved    Negative: [1] Using nasal washes and pain medicine > 24 hours AND [2] sinus pain (around  cheekbone or eye) persists    Negative: Earache    Negative: [1] Known COPD or other severe lung disease (i.e., bronchiectasis, cystic fibrosis, lung surgery) AND [2] worsening symptoms (i.e., increased sputum purulence or amount, increased breathing difficulty    Negative: Cough has been present for > 3 weeks    Negative: [1] Nasal discharge AND [2] present > 10 days    Negative: [1] Coughed up blood-tinged sputum AND [2] more than once    Negative: Exposure to TB (Tuberculosis)    Protocols used: COUGH - ACUTE [id number]

## 2022-09-13 ENCOUNTER — NURSE TRIAGE (OUTPATIENT)
Dept: NURSING | Facility: CLINIC | Age: 22
End: 2022-09-13

## 2022-09-13 NOTE — TELEPHONE ENCOUNTER
"Patient calling with concerns that she is continuously coughing and is always sweating. Cough is productive of white phlegm. Symptoms started two days ago. Patient reports that at times she is having some shortness of breath but not right now. Patient is currently walking to urgent care. Patient reports chest pain that has been constant since 5 am. Rating 4/10 and sharp and over her whole chest. Patient sates that she has visible sweat on her face but also reports being hot. \"I'm always hot.\" Patient is not sure where she is at the moment but states she is close to her group home. Patient denies chest injury.   Protocol recommends 911  Informed patient that ED is better place for her to get evaluated. If chest pain is constant and she is having visible sweat on her face she should call 911. Patient reports that she has staff at her group home who can help her. Patient states she is almost home now. Encouraged to call 911 for ambulance. Patient reports she will talk to her staff.   Devi Arteaga RN   09/13/22 1:35 PM  [facility] Nurse Advisor    Reason for Disposition    Visible sweat on face or sweat dripping down face    Additional Information    Negative: SEVERE difficulty breathing (e.g., struggling for each breath, speaks in single words)    Negative: Difficult to awaken or acting confused (e.g., disoriented, slurred speech)    Negative: Bluish (or gray) lips or face now    Negative: Shock suspected (e.g., cold/pale/clammy skin, too weak to stand, low BP, rapid pulse)    Negative: Sounds like a life-threatening emergency to the triager    Negative: [1] Diagnosed or suspected COVID-19 AND [2] symptoms lasting 3 or more weeks    Negative: [1] COVID-19 exposure AND [2] no symptoms    Negative: COVID-19 vaccine reaction suspected (e.g., fever, headache, muscle aches) occurring 1 to 3 days after getting vaccine    Negative: COVID-19 vaccine, questions about    Negative: [1] Lives with someone known to have " influenza (flu test positive) AND [2] flu-like symptoms (e.g., cough, runny nose, sore throat, SOB; with or without fever)    Negative: [1] Adult with possible COVID-19 symptoms AND [2] triager concerned about severity of symptoms or other causes    Negative: COVID-19 and breastfeeding, questions about    Negative: SEVERE difficulty breathing (e.g., struggling for each breath, speaks in single words)    Negative: Passed out (i.e., fainted, collapsed and was not responding)    Negative: Difficult to awaken or acting confused (e.g., disoriented, slurred speech)    Negative: Shock suspected (e.g., cold/pale/clammy skin, too weak to stand, low BP, rapid pulse)    Negative: Chest pain lasting longer than 5 minutes and ANY of the following:* Over 44 years old* Over 30 years old and at least one cardiac risk factor (e.g., diabetes mellitus, high blood pressure, high cholesterol, smoker, or strong family history of heart disease)* History of heart disease (i.e., angina, heart attack, heart failure, bypass surgery, takes nitroglycerin)* Pain is crushing, pressure-like, or heavy    Negative: Heart beating < 50 beats per minute OR > 140 beats per minute    Protocols used: CHEST PAIN-A-OH, CORONAVIRUS (COVID-19) DIAGNOSED OR [id number] 1.18.2022

## 2022-09-15 ENCOUNTER — NURSE TRIAGE (OUTPATIENT)
Dept: NURSING | Facility: CLINIC | Age: 22
End: 2022-09-15

## 2022-09-16 ENCOUNTER — HOSPITAL ENCOUNTER (EMERGENCY)
Facility: CLINIC | Age: 22
Discharge: HOME | End: 2022-09-16
Attending: EMERGENCY MEDICINE | Admitting: EMERGENCY MEDICINE
Payer: MEDICARE

## 2022-09-16 ENCOUNTER — NURSE TRIAGE (OUTPATIENT)
Dept: NURSING | Facility: CLINIC | Age: 22
End: 2022-09-16

## 2022-09-16 ENCOUNTER — HOSPITAL ENCOUNTER (OUTPATIENT)
Dept: ULTRASOUND IMAGING | Facility: CLINIC | Age: 22
Discharge: HOME | End: 2022-09-16
Attending: NURSE PRACTITIONER
Payer: MEDICARE

## 2022-09-16 ENCOUNTER — OFFICE VISIT (OUTPATIENT)
Dept: OBGYN | Facility: CLINIC | Age: 22
End: 2022-09-16
Attending: ADVANCED PRACTICE MIDWIFE
Payer: MEDICARE

## 2022-09-16 VITALS
HEART RATE: 73 BPM | HEIGHT: 64 IN | SYSTOLIC BLOOD PRESSURE: 132 MMHG | WEIGHT: 236.5 LBS | BODY MASS INDEX: 40.37 KG/M2 | DIASTOLIC BLOOD PRESSURE: 84 MMHG

## 2022-09-16 VITALS
SYSTOLIC BLOOD PRESSURE: 109 MMHG | DIASTOLIC BLOOD PRESSURE: 67 MMHG | TEMPERATURE: 98.3 F | HEART RATE: 72 BPM | RESPIRATION RATE: 16 BRPM | OXYGEN SATURATION: 100 %

## 2022-09-16 DIAGNOSIS — R07.89 OTHER CHEST PAIN: ICD-10-CM

## 2022-09-16 DIAGNOSIS — N92.4 EXCESSIVE BLEEDING IN PREMENOPAUSAL PERIOD: ICD-10-CM

## 2022-09-16 DIAGNOSIS — N92.0 SPOTTING: ICD-10-CM

## 2022-09-16 DIAGNOSIS — R07.9 CHEST PAIN, UNSPECIFIED TYPE: ICD-10-CM

## 2022-09-16 DIAGNOSIS — N92.1 BREAKTHROUGH BLEEDING ON DEPO PROVERA: Primary | ICD-10-CM

## 2022-09-16 PROCEDURE — 99284 EMERGENCY DEPT VISIT MOD MDM: CPT | Mod: 25

## 2022-09-16 PROCEDURE — G0463 HOSPITAL OUTPT CLINIC VISIT: HCPCS | Mod: 25

## 2022-09-16 PROCEDURE — 99213 OFFICE O/P EST LOW 20 MIN: CPT | Performed by: ADVANCED PRACTICE MIDWIFE

## 2022-09-16 PROCEDURE — 99284 EMERGENCY DEPT VISIT MOD MDM: CPT | Mod: 25 | Performed by: EMERGENCY MEDICINE

## 2022-09-16 PROCEDURE — 93005 ELECTROCARDIOGRAM TRACING: CPT

## 2022-09-16 PROCEDURE — 76830 TRANSVAGINAL US NON-OB: CPT

## 2022-09-16 PROCEDURE — 250N000013 HC RX MED GY IP 250 OP 250 PS 637: Performed by: EMERGENCY MEDICINE

## 2022-09-16 PROCEDURE — 76830 TRANSVAGINAL US NON-OB: CPT | Mod: 26 | Performed by: RADIOLOGY

## 2022-09-16 PROCEDURE — 93010 ELECTROCARDIOGRAM REPORT: CPT | Performed by: EMERGENCY MEDICINE

## 2022-09-16 RX ORDER — ACETAMINOPHEN 500 MG
1000 TABLET ORAL ONCE
Status: COMPLETED | OUTPATIENT
Start: 2022-09-16 | End: 2022-09-16

## 2022-09-16 RX ORDER — IBUPROFEN 200 MG
400 TABLET ORAL ONCE
Status: COMPLETED | OUTPATIENT
Start: 2022-09-16 | End: 2022-09-16

## 2022-09-16 RX ORDER — MAGNESIUM HYDROXIDE/ALUMINUM HYDROXICE/SIMETHICONE 120; 1200; 1200 MG/30ML; MG/30ML; MG/30ML
15 SUSPENSION ORAL ONCE
Status: COMPLETED | OUTPATIENT
Start: 2022-09-16 | End: 2022-09-16

## 2022-09-16 RX ADMIN — ALUMINUM HYDROXIDE, MAGNESIUM HYDROXIDE, AND SIMETHICONE 15 ML: 200; 200; 20 SUSPENSION ORAL at 16:15

## 2022-09-16 RX ADMIN — IBUPROFEN 400 MG: 200 TABLET, FILM COATED ORAL at 16:15

## 2022-09-16 RX ADMIN — ACETAMINOPHEN 1000 MG: 500 TABLET ORAL at 16:15

## 2022-09-16 ASSESSMENT — ENCOUNTER SYMPTOMS
SORE THROAT: 0
VOMITING: 0
ABDOMINAL PAIN: 0
MYALGIAS: 0
FEVER: 0
COUGH: 1
NAUSEA: 0
CHILLS: 1

## 2022-09-16 ASSESSMENT — ACTIVITIES OF DAILY LIVING (ADL)
ADLS_ACUITY_SCORE: 37
ADLS_ACUITY_SCORE: 37

## 2022-09-16 NOTE — LETTER
"9/16/2022       RE: Sadaf Ross  [address] [place] [id number]     Dear Colleague,    Thank you for referring your patient, Sadaf Ross, to the [facility] [place] at [facility]. Please see a copy of my visit note below.    S: Sadaf Ross is a 21 year old here for review of ultrasound.     Pt uses Depo Provera for contraception. States that she just started using this. Had an injection on 7/12/22 and then noticed a sudden onset of heavy vaginal bleeding with clots the size of a quarter. Lasted approximately 1-2 hours then resolved. Did present to the ED but had no bleeding upon arrival.     Clinic appointment with Dr. Alva on 8/30/22. Not having bleeding at that time. Normal work up. Side effect of irregular bleeding patterns with depo was reviewed. Pelvic ultrasound ordered to r/o other causes.    Ultrasound performed today- all normal.     Pt reports she has not had bleeding since that time. No concerns. Has her next dose of depo scheduled for the end of the month.     O: /84 (BP Location: Left arm, Patient Position: Sitting, Cuff Size: Adult Regular)   Pulse 73   Ht 1.626 m (5' 4\")   Wt 107.3 kg (236 lb 8 oz)   LMP 08/25/2022   BMI 40.60 kg/m      Mood: alert, engaged in conversation, appropriate questions   No sign of acute distress    Ultrasound:  HISTORY: Excessive bleeding in premenopausal period; Spotting     TECHNIQUE: The pelvis was scanned in standard fashion with  transabdominal and transvaginal transducer(s) using both grey scale  and limited color Doppler techniques.     FINDINGS:  The uterus measures 6.8 x 3.9 x 4.7, and there is no evidence of a  focal fibroid.  The endometrium is within normal limits and measures 5  mm. There is no free fluid in the pelvis.     The right ovary measures 2.0 x 3.0 x 1.7 and the left ovary measures  1.5 x 2.3 x 2.0. There is no adnexal mass. There is normal " blood flow  to the ovaries.    IMPRESSION: Normal pelvic ultrasound.    A: (N92.1) Breakthrough bleeding on depo provera  (primary encounter diagnosis)    P:  - Reviewed normal ultrasound.  - Discussed normal bleeding variations s/p initiation of depo provera.   No concerns at this time.   - Will get next dose of depo at [facility] on 9/28/22.    Pt verbalized understanding and agrees with plan of care.     LINN López, CORTES    15 minutes spent on the date of the encounter doing chart review, history and exam, documentation and further activities as noted above

## 2022-09-16 NOTE — ED PROVIDER NOTES
"ED Provider Note  [facility]      History     Chief Complaint   Patient presents with     Chest Pain     Pt said chest pain started at 1 am, severe pain. Pt said she felt dizzy and lightheaded. Denies nausea and vomiting.      The history is provided by the patient and medical records.     Sadaf Ross is a 21 year old female with history of intellectual disability, borderline personality disorder, bipolar 1 disorder, who is well-known to the ED d/t her frequent ED visits presenting to the ED via EMS from her  for chest pain. Patient reports she has been experiencing episodes of chest pain every 8 hours for the past 4 days. These episodes last 1-2 hours. This episode is the worst episode she has had. It began at 1 am today and has been constant since. She describes the pain as a extreme, sharp pain that is diffuse throughout the chest. It is equal on both sides and non-radiating. It is made worse with eating. She describes increased pooling of saliva in the throat. She does have history of GERD, thinks this may be a flare of that. She has been taking 800 mg ibuprofen every 8 hours for pain with relief, but has not taken this in a few days. She reports mild cough and chills for the last 2 days. No sore throat or aches. No other associated pain or symptoms. She describes her mood as \"up and down\", is \"crabby\" because of the pain, but denies any suicidal ideation, and would like to go home.     Past Medical History  Past Medical History:   Diagnosis Date     ADHD (attention deficit hyperactivity disorder)      Bipolar 1 disorder (H)      Borderline personality disorder (H)      Depression      Depressive disorder      Intellectual disability      Obesity      Syncope      Past Surgical History:   Procedure Laterality Date     APPENDECTOMY       APPENDECTOMY       acetaminophen (TYLENOL) 325 MG tablet  alum & mag hydroxide-simethicone (MAALOX  ES) 400-400-40 MG/5ML SUSP " suspension  ARIPiprazole lauroxil ER (ARISTADA) 882 MG/3.2ML intra-muscular  benztropine (COGENTIN) 1 MG tablet  calcium carbonate (TUMS) 500 MG chewable tablet  chlorhexidine (PERIDEX) 0.12 % solution  clobetasol (TEMOVATE) 0.05 % CREA cream  cyclobenzaprine (FLEXERIL) 10 MG tablet  diclofenac (VOLTAREN) 1 % topical gel  docusate sodium (COLACE) 100 MG capsule  fluticasone (FLONASE) 50 MCG/ACT nasal spray  guaiFENesin (ROBITUSSIN) 100 MG/5ML SYRP  hydrocortisone (CORTAID) 0.5 % external cream  hydrOXYzine (ATARAX) 50 MG tablet  ibuprofen (ADVIL/MOTRIN) 400 MG tablet  loperamide (IMODIUM) 2 MG capsule  LORazepam (ATIVAN) 0.5 MG tablet  metoclopramide (REGLAN) 10 MG tablet  multivitamin, therapeutic (THERA-VIT) TABS tablet  omeprazole (PRILOSEC) 40 MG DR capsule  ondansetron (ZOFRAN) 4 MG tablet  polyethylene glycol (MIRALAX) 17 g packet  prochlorperazine (COMPAZINE) 10 MG tablet  sennosides (SENOKOT) 8.6 MG tablet  traZODone (DESYREL) 50 MG tablet  venlafaxine (EFFEXOR XR) 75 MG 24 hr capsule  Vitamin D, Cholecalciferol, 25 MCG (1000 UT) TABS      Allergies   Allergen Reactions     Penicillins Rash and Unknown     Family History  Family History   Problem Relation Age of Onset     Diabetes Type 1 Father      Cancer Paternal Grandfather      Social History   Social History     Tobacco Use     Smoking status: Current Every Day Smoker     Packs/day: 0.50     Years: 5.00     Pack years: 2.50     Types: Vaping Device     Smokeless tobacco: Never Used   Substance Use Topics     Alcohol use: No     Drug use: No      Past medical history, past surgical history, medications, allergies, family history, and social history were reviewed with the patient. No additional pertinent items.       Review of Systems   Constitutional: Positive for chills. Negative for fever.   HENT: Negative for sore throat.    Respiratory: Positive for cough.    Cardiovascular: Positive for chest pain.   Gastrointestinal: Negative for abdominal pain,  nausea and vomiting.   Musculoskeletal: Negative for myalgias.   All other systems reviewed and are negative.    A complete review of systems was performed with pertinent positives and negatives noted in the HPI, and all other systems negative.    Physical Exam   BP: 109/67  Pulse: 72  Temp: 98.3  F (36.8  C)  Resp: 16  SpO2: 100 %  Physical Exam  Vitals and nursing note reviewed.   Constitutional:       General: She is not in acute distress.     Appearance: She is well-developed. She is not diaphoretic.   HENT:      Head: Atraumatic.      Mouth/Throat:      Pharynx: No oropharyngeal exudate.   Eyes:      General: No scleral icterus.     Pupils: Pupils are equal, round, and reactive to light.   Cardiovascular:      Rate and Rhythm: Normal rate and regular rhythm.      Heart sounds: Normal heart sounds.   Pulmonary:      Effort: No respiratory distress.      Breath sounds: Normal breath sounds.   Abdominal:      General: Bowel sounds are normal.      Palpations: Abdomen is soft.      Tenderness: There is no abdominal tenderness.   Musculoskeletal:         General: No tenderness.   Skin:     General: Skin is warm.      Findings: No rash.   Neurological:      General: No focal deficit present.      Mental Status: She is alert and oriented to person, place, and time.   Psychiatric:         Mood and Affect: Mood normal.         Behavior: Behavior normal.           ED Course      Procedures     Mental Health Risk Assessment      PSS-3    Date and Time Over the past 2 weeks have you felt down, depressed, or hopeless? Over the past 2 weeks have you had thoughts of killing yourself? Have you ever attempted to kill yourself? When did this last happen? User   09/16/22 1427 yes yes yes between 1 and 6 months ago N      C-SSRS ([place])    Date and Time Q1 Wished to be Dead (Past Month) Q2 Suicidal Thoughts (Past Month) Q3 Suicidal Thought Method Q4 Suicidal Intent without Specific Plan Q5 Suicide Intent with Specific Plan Q6  Suicide Behavior (Lifetime) Within the Past 3 Months? RETIRED: Level of Risk per Screen Screening Not Complete User   09/16/22 1427 yes yes no yes no yes -- -- -- VCN                   Results for orders placed or performed during the hospital encounter of 09/16/22   EKG 12 lead     Status: None (Preliminary result)   Result Value Ref Range    Systolic Blood Pressure  mmHg    Diastolic Blood Pressure  mmHg    Ventricular Rate 88 BPM    Atrial Rate 90 BPM    KS Interval 174 ms    QRS Duration 88 ms     ms    QTc 433 ms    P Axis 39 degrees    R AXIS 18 degrees    T Axis 3 degrees    Interpretation ECG Sinus rhythm  Normal ECG      Results for orders placed or performed during the hospital encounter of 09/16/22   US Transvaginal Pelvic Non-OB     Status: None    Narrative    EXAMINATION: US TRANSVAGINAL PELVIC NON-OB, 9/16/2022 7:49 AM     COMPARISON: 5/24/2022    HISTORY: Excessive bleeding in premenopausal period; Spotting    TECHNIQUE: The pelvis was scanned in standard fashion with  transabdominal and transvaginal transducer(s) using both grey scale  and limited color Doppler techniques.    FINDINGS:  The uterus measures 6.8 x 3.9 x 4.7, and there is no evidence of a  focal fibroid.  The endometrium is within normal limits and measures 5  mm. There is no free fluid in the pelvis.    The right ovary measures 2.0 x 3.0 x 1.7 and the left ovary measures  1.5 x 2.3 x 2.0. There is no adnexal mass. There is normal blood flow  to the ovaries.      Impression    IMPRESSION: Normal pelvic ultrasound.    I have personally reviewed the examination and initial interpretation  and I agree with the findings.    DEXTER WHITTAKER MD         SYSTEM ID:  [id number]     Medications - No data to display     Assessments & Plan (with Medical Decision Making)     21 year old female with history of intellectual disability, borderline personality disorder, bipolar 1 disorder, who is well-known to the ED d/t her frequent ED visits  presenting to the ED via EMS from her  for chest pain.  Vital signs stable and afebrile in triage including a normal pulse ox 90% on room air.  EKG obtained and reviewed IV which revealed normal sinus rhythm without any acute ischemic changes.  Patient medicated with ibuprofen, Maalox and acetaminophen upon repeat assessment patient's symptoms had improved and she is requesting discharge home.    I have reviewed the nursing notes. I have reviewed the findings, diagnosis, plan and need for follow up with the patient.    New Prescriptions    No medications on file       Final diagnoses:   Chest pain, unspecified type       --  Becky Duggan MD  [facility] EMERGENCY DEPARTMENT  9/16/2022     Becky Duggan MD  09/17/22 [id number]

## 2022-09-17 ENCOUNTER — NURSE TRIAGE (OUTPATIENT)
Dept: NURSING | Facility: CLINIC | Age: 22
End: 2022-09-17

## 2022-09-17 ENCOUNTER — HOSPITAL ENCOUNTER (EMERGENCY)
Facility: CLINIC | Age: 22
Discharge: HOME | End: 2022-09-17
Attending: EMERGENCY MEDICINE | Admitting: EMERGENCY MEDICINE
Payer: MEDICARE

## 2022-09-17 VITALS
RESPIRATION RATE: 16 BRPM | DIASTOLIC BLOOD PRESSURE: 84 MMHG | OXYGEN SATURATION: 100 % | SYSTOLIC BLOOD PRESSURE: 130 MMHG | HEART RATE: 80 BPM

## 2022-09-17 DIAGNOSIS — F31.9 BIPOLAR DISORDER, UNSPECIFIED (H): ICD-10-CM

## 2022-09-17 DIAGNOSIS — R45.851 SUICIDAL IDEATION: ICD-10-CM

## 2022-09-17 PROCEDURE — 99284 EMERGENCY DEPT VISIT MOD MDM: CPT | Performed by: EMERGENCY MEDICINE

## 2022-09-17 PROCEDURE — 99285 EMERGENCY DEPT VISIT HI MDM: CPT | Mod: 25

## 2022-09-17 PROCEDURE — 90791 PSYCH DIAGNOSTIC EVALUATION: CPT

## 2022-09-17 ASSESSMENT — ACTIVITIES OF DAILY LIVING (ADL)
ADLS_ACUITY_SCORE: 37
ADLS_ACUITY_SCORE: 37

## 2022-09-17 NOTE — ED NOTES
Attempted to call pt's group home for collateral information.  Left message at [phone] for group home staff to call back re: patient and reason for ED visit.    ZEESHAN Wolff, LICSW

## 2022-09-17 NOTE — TELEPHONE ENCOUNTER
Nurse Triage SBAR    Situation: Depression - suicidal thoughts    Background: Patient calling. Father passed away in feb.     Assessment: Suicidal thoughts. She called the EMT to come out and said the EMT stated the hospital won't do anything for her. She states they just left. She is very upset and feels lonely. Feels like she can't think. Was having chest pain and dizziness. Heart pounding. Crying herself to sleep. Took her medications but vomited them up. Patient has plans/thoughts to harm herself. Thoughts started when her father passed away. Hard time to do her day to day activities. Thoughts of cutting herself.     Protocol Recommended Disposition: Emergency Department    Recommendation: According to the protocol, Patient should go to the ED now. Advised Patient to go to the ED now. Care advice given. Patient verbalizes understanding and agrees with plan of care. Patient does not have a way to the ED and stated she is going to call 911 for them to bring her to the ED.      Megan Lopez RN Nursing Advisor 9/17/2022 5:45 PM     Reason for Disposition    Suicide thoughts, threats, attempts, or questions    [1] Depression symptoms (sadness, hopelessness, decreased energy) AND [2] unable to do any normal activities (e.g., self care, school, work; in comparison to baseline).    Additional Information    Negative: Patient attempted suicide    Negative: Patient is threatening suicide now    Negative: Violent behavior, or threatening to physically hurt or kill someone    Negative: [1] Patient is very confused (disoriented, slurred speech) AND [2] no other adult (e.g., friend or family member) available    Negative: [1] Difficult to awaken or acting very confused (disoriented, slurred speech) AND [2] new-onset    Negative: Sounds like a life-threatening emergency to the triager    Negative: Patient attempted suicide    Negative: Patient is threatening suicide now    Negative: Violent behavior, or threatening to  physically hurt or kill someone    Negative: [1] Patient is very confused (disoriented, slurred speech) AND [2] no other adult (e.g., friend or family member) available    Negative: [1] Difficult to awaken or acting very confused (disoriented, slurred speech) AND [2] new-onset    Negative: Sounds like a life-threatening emergency to the triager    Negative: Depression is main symptom and is not threatening suicide    Protocols used: DEPRESSION-A-AH, SUICIDE [id number]

## 2022-09-17 NOTE — TELEPHONE ENCOUNTER
Patient states she is having thoughts of hurting herself.    She was crying her eyes out for 10 minutes.    She states she does not have any weapons.    She was just in the hospital this morning and she just got released and she got home 5 minutes ago.    Patient is able to do her normal activities.  She does not have a plan.    Patient advised to go to the ER [facility]. Patient agrees with the plan.    Mercy Stevenson RN on 9/16/2022 at 8:11 PM      Reason for Disposition    Sometimes has thoughts of suicide    Additional Information    Negative: Patient attempted suicide    Negative: Patient is threatening suicide now    Negative: Violent behavior, or threatening to physically hurt or kill someone    Negative: [1] Patient is very confused (disoriented, slurred speech) AND [2] no other adult (e.g., friend or family member) available    Negative: [1] Difficult to awaken or acting very confused (disoriented, slurred speech) AND [2] new-onset    Negative: Sounds like a life-threatening emergency to the triager    Negative: Depression is main symptom and is not threatening suicide    Negative: [1] Depression symptoms (sadness, hopelessness, decreased energy) AND [2] unable to do any normal activities (e.g., self care, school, work; in comparison to baseline).    Negative: Patient sounds very sick or weak to the triager    Negative: [1] Patient is not threatening suicide now BUT [2] has a suicide PLAN (e.g., overdose, gunshot) and ACCESS (e.g., collecting pills, gun in house)    Negative: [1] Patient is not threatening suicide now BUT [2] had SUICIDAL BEHAVIORS in past 3 months    Negative: Depression symptoms (sadness, hopelessness, decreased energy) interfere with work or school    Protocols used: SUICIDE [id number]

## 2022-09-17 NOTE — ED NOTES
The following information was received from Sophie whose relationship to the patient is group home staff. Information was obtained via phone. Their phone number is [phone] and they last had contact with patient on today, 9/17/22.    What happened today: Sophie states that today, the pt was having a good day.  States that they went shopping this afternoon and she thought the pt was doing well.    What is different about patient's functioning: Sophie states that unbeknownst to her, the pt called 911.  Sophie states that when EMS showed up, the pt told them she was suicidal.  Sophie states that she and EMS talked with the pt and the decision was made not bring the pt to the hospital as she could keep herself safe.  Sophie states that once EMS left, the pt started throwing up, repeatedly, so she called 911 and requested the pt be brought to the ED.      Concern about alcohol/drug use: No    What do you think the patient needs: Sophie states she does not know what the pt needs but says that this is behavioral.    Has patient made comments about wanting to kill themselves/others:  Yes States the pt told her and EMS that she called 911 because she had suicidal ideation.  After talking with her and EMS, the pt was able to commit to safety.      If d/c is recommended, can they take part in safety/aftercare planning: Yes Group home staff is aware that the pt is frequently at the ED, including being seen yesterday for chest pain.  Sophie is aware that there is high likelihood that the pt will be returning to group home tonight.      Nuria SESAYSW

## 2022-09-18 ENCOUNTER — NURSE TRIAGE (OUTPATIENT)
Dept: NURSING | Facility: CLINIC | Age: 22
End: 2022-09-18

## 2022-09-18 ENCOUNTER — APPOINTMENT (OUTPATIENT)
Dept: GENERAL RADIOLOGY | Facility: CLINIC | Age: 22
End: 2022-09-18
Attending: EMERGENCY MEDICINE
Payer: MEDICARE

## 2022-09-18 ENCOUNTER — HOSPITAL ENCOUNTER (EMERGENCY)
Facility: CLINIC | Age: 22
Discharge: HOME | End: 2022-09-19
Attending: EMERGENCY MEDICINE | Admitting: EMERGENCY MEDICINE
Payer: MEDICARE

## 2022-09-18 ENCOUNTER — APPOINTMENT (OUTPATIENT)
Dept: ULTRASOUND IMAGING | Facility: CLINIC | Age: 22
End: 2022-09-18
Attending: EMERGENCY MEDICINE
Payer: MEDICARE

## 2022-09-18 DIAGNOSIS — R45.851 SUICIDAL IDEATION: ICD-10-CM

## 2022-09-18 LAB
ALBUMIN SERPL-MCNC: 4.5 G/DL (ref 3.4–5)
ALP SERPL-CCNC: 71 U/L (ref 40–150)
ALT SERPL W P-5'-P-CCNC: 24 U/L (ref 0–50)
ANION GAP SERPL CALCULATED.3IONS-SCNC: 5 MMOL/L (ref 3–14)
AST SERPL W P-5'-P-CCNC: 13 U/L (ref 0–45)
BASOPHILS # BLD AUTO: 0.1 10E3/UL (ref 0–0.2)
BASOPHILS NFR BLD AUTO: 1 %
BILIRUB SERPL-MCNC: 0.3 MG/DL (ref 0.2–1.3)
BUN SERPL-MCNC: 15 MG/DL (ref 7–30)
CALCIUM SERPL-MCNC: 9.8 MG/DL (ref 8.5–10.1)
CHLORIDE BLD-SCNC: 112 MMOL/L (ref 94–109)
CO2 SERPL-SCNC: 25 MMOL/L (ref 20–32)
CREAT SERPL-MCNC: 0.77 MG/DL (ref 0.52–1.04)
EOSINOPHIL # BLD AUTO: 0.1 10E3/UL (ref 0–0.7)
EOSINOPHIL NFR BLD AUTO: 1 %
ERYTHROCYTE [DISTWIDTH] IN BLOOD BY AUTOMATED COUNT: 13.1 % (ref 10–15)
GFR SERPL CREATININE-BSD FRML MDRD: >90 ML/MIN/1.73M2
GLUCOSE BLD-MCNC: 106 MG/DL (ref 70–99)
HCT VFR BLD AUTO: 40.2 % (ref 35–47)
HGB BLD-MCNC: 13.8 G/DL (ref 11.7–15.7)
HOLD SPECIMEN: NORMAL
HOLD SPECIMEN: NORMAL
IMM GRANULOCYTES # BLD: 0 10E3/UL
IMM GRANULOCYTES NFR BLD: 0 %
LIPASE SERPL-CCNC: 174 U/L (ref 73–393)
LYMPHOCYTES # BLD AUTO: 3.1 10E3/UL (ref 0.8–5.3)
LYMPHOCYTES NFR BLD AUTO: 26 %
MCH RBC QN AUTO: 28.6 PG (ref 26.5–33)
MCHC RBC AUTO-ENTMCNC: 34.3 G/DL (ref 31.5–36.5)
MCV RBC AUTO: 83 FL (ref 78–100)
MONOCYTES # BLD AUTO: 0.7 10E3/UL (ref 0–1.3)
MONOCYTES NFR BLD AUTO: 6 %
NEUTROPHILS # BLD AUTO: 7.9 10E3/UL (ref 1.6–8.3)
NEUTROPHILS NFR BLD AUTO: 66 %
NRBC # BLD AUTO: 0 10E3/UL
NRBC BLD AUTO-RTO: 0 /100
PLATELET # BLD AUTO: 265 10E3/UL (ref 150–450)
POTASSIUM BLD-SCNC: 4.2 MMOL/L (ref 3.4–5.3)
PROT SERPL-MCNC: 8.4 G/DL (ref 6.8–8.8)
RBC # BLD AUTO: 4.82 10E6/UL (ref 3.8–5.2)
SODIUM SERPL-SCNC: 142 MMOL/L (ref 133–144)
WBC # BLD AUTO: 12 10E3/UL (ref 4–11)

## 2022-09-18 PROCEDURE — 36415 COLL VENOUS BLD VENIPUNCTURE: CPT | Performed by: EMERGENCY MEDICINE

## 2022-09-18 PROCEDURE — 71046 X-RAY EXAM CHEST 2 VIEWS: CPT

## 2022-09-18 PROCEDURE — 93005 ELECTROCARDIOGRAM TRACING: CPT | Performed by: EMERGENCY MEDICINE

## 2022-09-18 PROCEDURE — 99285 EMERGENCY DEPT VISIT HI MDM: CPT | Mod: 25 | Performed by: EMERGENCY MEDICINE

## 2022-09-18 PROCEDURE — 76705 ECHO EXAM OF ABDOMEN: CPT

## 2022-09-18 PROCEDURE — 80053 COMPREHEN METABOLIC PANEL: CPT | Performed by: EMERGENCY MEDICINE

## 2022-09-18 PROCEDURE — 85025 COMPLETE CBC W/AUTO DIFF WBC: CPT | Performed by: EMERGENCY MEDICINE

## 2022-09-18 PROCEDURE — 250N000009 HC RX 250: Performed by: EMERGENCY MEDICINE

## 2022-09-18 PROCEDURE — 83690 ASSAY OF LIPASE: CPT | Performed by: EMERGENCY MEDICINE

## 2022-09-18 PROCEDURE — 93010 ELECTROCARDIOGRAM REPORT: CPT | Performed by: EMERGENCY MEDICINE

## 2022-09-18 PROCEDURE — 250N000013 HC RX MED GY IP 250 OP 250 PS 637: Performed by: EMERGENCY MEDICINE

## 2022-09-18 PROCEDURE — 250N000011 HC RX IP 250 OP 636: Performed by: EMERGENCY MEDICINE

## 2022-09-18 RX ORDER — ONDANSETRON 4 MG/1
4 TABLET, ORALLY DISINTEGRATING ORAL
Status: COMPLETED | OUTPATIENT
Start: 2022-09-18 | End: 2022-09-18

## 2022-09-18 RX ADMIN — LIDOCAINE HYDROCHLORIDE 30 ML: 20 SOLUTION ORAL; TOPICAL at 19:00

## 2022-09-18 RX ADMIN — ONDANSETRON 4 MG: 4 TABLET, ORALLY DISINTEGRATING ORAL at 18:13

## 2022-09-18 ASSESSMENT — ACTIVITIES OF DAILY LIVING (ADL)
ADLS_ACUITY_SCORE: 37
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
## 2022-09-18 NOTE — ED NOTES
"Pt refusing blood draw, per lab. Upon approach, pt teary stating \"you're just going to send me back to the group home anyway.\" writer offered emotional support. Pt agreed to blood draw.  "

## 2022-09-18 NOTE — DISCHARGE INSTRUCTIONS
"Aftercare Plan  If I am feeling unsafe or I am in a crisis, I will:   Contact my established care providers   Call the National Suicide Prevention Lifeline: 988  Go to the nearest emergency room   Call 911     Things I am able to do on my own or with others to cope or help me feel better: go for a walk, practice deep breathing, practice mindfulness and coping skills (listed below)    Changes I can make to support my mental health and wellness: adhere to treatment recommendations, practice coping skills, talk to my group home staff    People in my life that I can ask for help: group home staff, therapist and family    Your [facility] has a mental health crisis team you can call 24/7: [facility] Mobile Crisis  [phone]     Crisis Lines  Crisis Text Line  Text 789857  You will be connected with a trained live crisis counselor to provide support.    Por oleanol, texto  SIRENA a 357736 o texto a 442-AYUDAME en WhatsApp    The [employer] (LGBTQ Youth Crisis Line)  0.763.783.3986  text START to 506-981      [employer]  Fast Tracker  Linking people to mental health and substance use disorder resources  [employer]     [place] Mental Health Warm Line  Peer to peer support  Monday thru Saturday, 12 pm to 10 pm  681.615.8522 or 5.699.080.9497  Text \"Support\" to 34923    National [employer] Mental Illness (MAGY)  880.095.5097 or 1.888.MAGY.HELPS      Mental Health Apps  My3  https://myNevropp.org/    VirtualHopeBox  https://MailTime.org/apps/virtual-hope-box/      Additional Information  Today you were seen by a licensed mental health professional through Triage and Transition services, [facility] ([facility])  for a crisis assessment in the Emergency Department at [facility].  It is recommended that you follow up with your established providers (psychiatrist, mental health therapist, and/or primary care doctor - as relevant) as soon as possible. Coordinators from P " will be calling you in the next 24-48 hours to ensure that you have the resources you need.  You can also contact [facility] coordinators directly at [phone]. You may have been scheduled for or offered an appointment with a mental health provider. [facility] maintains an extensive network of licensed behavioral health providers to connect patients with the services they need.  We do not charge providers a fee to participate in our referral network.  We match patients with providers based on a patient's specific needs, insurance coverage, and location.  Our first effort will be to refer you to a provider within your care system, and will utilize providers outside your care system as needed.      Grounding Techniques:  Try to notice where you are, your surroundings including the people, the sounds like the TV or radio.  Concentrate on your breathing. Take a deep cleansing breath from your diaphragm. Count the breaths as you exhale. Make sure you breath slowly.  Hold something that you find comforting, for some it may be a stuffed animal or a blanket. Notice how it feels in your hands. Is it hard or soft?  During a non-crisis time make a list of positive affirmations. Print them out and keep them handy for times of intense anxiety. At those times, read them aloud.  Try the ServiceMesh game:  Name 5 things you can see in the room with you  Name 4 things you can feel ( chair on my back  or  feet on floor )   Name 3 things you can hear right now ( people talking  or  tv )   Name 2 things you can smell right now (or, 2 things you like the smell of)   Name 1 good thing about yourself  Create A Safe Place  Image a safe place -- it can be a real or imaginary place:   What do you see -- especially colors?   What sounds do you hear?   What sensations do you feel?   What smells do you smell?   What people or animals would you want in your safe place?   Imagine a protective bubble, wall or boundary around your safe place.   Imagine a door or  gate with a guard at your safe place.   Image a lock and key to your safe place and only you can unlock it.  You can draw or make a collage that represents your safe place.   Choose a souvenir of your safe place -- a color, an object, a song.   Keep your image of your safe place so you can come back to it when you need to.   Reduce Extreme Emotion  QUICKLY:  Changing Your Body Chemistry      T:  Change your body Temperature to change your autonomic nervous system   Use Ice Water to calm yourself down FAST   Put your face in a bowl of ice water (this is the best way; have the person keep his/her face in ice water for 30-45 seconds - initial research is showing that the longer s/he can hold her/his face in the water, the better the response), or   Splash ice water on your face, or hold an ice pack on your face      I:  Intensely exercise to calm down a body revved up by emotion   Examples: running, walking fast, jumping, playing basketball, weight lifting, swimming, calisthenics, etc.   Engage in exercises that DO NOT include violent behaviors. Exercises that utilize violent behaviors tend to function as  behavioral rehearsal,  and rather than calming the person down, may actually  rev  the person up more, increasing the likelihood of violence, and lessening the likelihood that they will  burn off  energy     P:  Progressively relax your muscles   Starting with your hands, moving to your forearms, upper arms, shoulders, neck, forehead, eyes, cheeks and lips, tongue and teeth, chest, upper back, stomach, buttocks, thighs, calves, ankles, feet   Tense (10 seconds,   of the way), then relax each muscle (all the way)   Notice the tension   Notice the difference when relaxed (by tensing first, and then relaxing, you are able to get a more thorough relaxation than by simply relaxing)     P: Paced breathing to relax   The standard technique is to begin with counting the number of steps one takes for a typical inhale, then  counting the steps one takes for a typical exhale, and then lengthening the amount of steps for the exhalation by one or two steps.  OR  Repeat this pattern for 1-2 minutes  Inhale for four (4) seconds   Exhale for six (6) to eight (8) seconds   Research demonstrated that one can change one's overall level of anxiety by doing this exercise for even a few minutes per day

## 2022-09-18 NOTE — ED TRIAGE NOTES
"BIB EMS for suicidal ideation with plan to \"eat [herself].\" pt also reports having abdominal discomfort this morning with multiple bouts of emesis.     Triage Assessment     Row Name 09/18/22 9662       Triage Assessment (Adult)    Airway WDL WDL       Respiratory WDL    Respiratory WDL WDL       Skin Circulation/Temperature WDL    Skin Circulation/Temperature WDL WDL       Cardiac WDL    Cardiac WDL WDL       Peripheral/Neurovascular WDL    Peripheral Neurovascular WDL WDL       Cognitive/Neuro/Behavioral WDL    Cognitive/Neuro/Behavioral WDL WDL              "

## 2022-09-18 NOTE — TELEPHONE ENCOUNTER
Patient calling in after vomiting 3-4 times.   Mucus and undigested lunch  Denied any blood in the vomit  The vomiting started 5 minutes ago.   Patient is mad and anxious.   She went for a walk.   She stated chest pain that is sharp.  Pain in the chest is ongoing for the last 5-6 days. She stated it is pressure in the chest and constant.   Pain in the chest got worse after vomiting.   Per RN protocol patient should call 911. Asked if patient wanted triage to call 911? She declined and stated she will call.   Patricia Coleman RN, BSN [facility] Triage Nurse          Reason for Disposition    [1] Chest pain lasts > 5 minutes AND [2] described as crushing, pressure-like, or heavy    Additional Information    Negative: SEVERE difficulty breathing (e.g., struggling for each breath, speaks in single words)    Negative: Difficult to awaken or acting confused (e.g., disoriented, slurred speech)    Negative: Shock suspected (e.g., cold/pale/clammy skin, too weak to stand, low BP, rapid pulse)    Negative: Passed out (i.e., lost consciousness, collapsed and was not responding)    Negative: [1] Chest pain lasts > 5 minutes AND [2] age > 44    Negative: [1] Chest pain lasts > 5 minutes AND [2] age > 30 AND [3] one or more cardiac risk factors (e.g., diabetes, high blood pressure, high cholesterol, smoker, or strong family history of heart disease)    Negative: [1] Chest pain lasts > 5 minutes AND [2] history of heart disease (i.e., angina, heart attack, heart failure, bypass surgery, takes nitroglycerin)    Protocols used: CHEST PAIN-A-

## 2022-09-18 NOTE — ED NOTES
Bed: HW02  Expected date: 9/18/22  Expected time: 4:20 PM  Means of arrival: Ambulance  Comments:  [address]  20yo F si

## 2022-09-18 NOTE — ED PROVIDER NOTES
"    [facility] EMERGENCY DEPARTMENT ([facility])    9/18/22        History     Chief Complaint   Patient presents with     Suicidal     BIB EMS for Suicidal ideation with plan to \"eat [herself]\". Pt reporting vomiting and chills today     The history is provided by the patient and medical records.     Sadaf Ross is a 21 year old female with history of intellectual disability, borderline personality disorder, bipolar 1 disorder who presents to the Emergency Department via EMS with suicidal ideation. Patient reports her father passed away in February 2021. She states she has been having a difficulty time coping. She reports feeling suicidal. She reported to the triage nurse that she had a plan to eat herself. Patient reports she has a strong support system. She lives in a group home. Patient has a psychiatrist and therapist. She has a psychiatry appointment either this or next week. She states she had a therapist appointment tomorrow, however, cancelled this. She states she gets mad and cancels all her appointments. Patient reports she has also had 4 episodes of vomiting since leaving the ED yesterday. She states it has been brown in color, denies blood in her vomit. She reports some throat pain from vomiting. Patient denies diarrhea, blood in stool, or dysuria. Patient reports RUQ abdominal pain. She states pain radiates into the back and has been constant. This has been ongoing for a couple of months. Patient denies ear pain. She endorses chills and diaphoresis. No fevers. Patient reports she is taking her medications as prescribed. She denies drug use, states she used to use marijuana but quit a couple of years ago. Patient denies HI. She endorses hallucinations, states she was walking around her group home today and kept looking behind her to make sure no one was following her. She states she heard a voice in her head, she then told it to leave her alone.    Past Medical History  Past Medical History: "   Diagnosis Date     ADHD (attention deficit hyperactivity disorder)      Bipolar 1 disorder (H)      Borderline personality disorder (H)      Depression      Depressive disorder      Intellectual disability      Obesity      Syncope      Past Surgical History:   Procedure Laterality Date     APPENDECTOMY       APPENDECTOMY       acetaminophen (TYLENOL) 325 MG tablet  alum & mag hydroxide-simethicone (MAALOX  ES) 400-400-40 MG/5ML SUSP suspension  ARIPiprazole lauroxil ER (ARISTADA) 882 MG/3.2ML intra-muscular  benztropine (COGENTIN) 1 MG tablet  calcium carbonate (TUMS) 500 MG chewable tablet  chlorhexidine (PERIDEX) 0.12 % solution  clobetasol (TEMOVATE) 0.05 % CREA cream  cyclobenzaprine (FLEXERIL) 10 MG tablet  diclofenac (VOLTAREN) 1 % topical gel  docusate sodium (COLACE) 100 MG capsule  fluticasone (FLONASE) 50 MCG/ACT nasal spray  guaiFENesin (ROBITUSSIN) 100 MG/5ML SYRP  hydrocortisone (CORTAID) 0.5 % external cream  hydrOXYzine (ATARAX) 50 MG tablet  ibuprofen (ADVIL/MOTRIN) 400 MG tablet  loperamide (IMODIUM) 2 MG capsule  LORazepam (ATIVAN) 0.5 MG tablet  metoclopramide (REGLAN) 10 MG tablet  multivitamin, therapeutic (THERA-VIT) TABS tablet  omeprazole (PRILOSEC) 40 MG DR capsule  ondansetron (ZOFRAN) 4 MG tablet  polyethylene glycol (MIRALAX) 17 g packet  prochlorperazine (COMPAZINE) 10 MG tablet  sennosides (SENOKOT) 8.6 MG tablet  traZODone (DESYREL) 50 MG tablet  venlafaxine (EFFEXOR XR) 75 MG 24 hr capsule  Vitamin D, Cholecalciferol, 25 MCG (1000 UT) TABS      Allergies   Allergen Reactions     Penicillins Rash and Unknown     Family History  Family History   Problem Relation Age of Onset     Diabetes Type 1 Father      Cancer Paternal Grandfather      Social History   Social History     Tobacco Use     Smoking status: Current Every Day Smoker     Packs/day: 0.50     Years: 5.00     Pack years: 2.50     Types: Vaping Device     Smokeless tobacco: Never Used   Substance Use Topics     Alcohol use:  No     Drug use: No      Past medical history, past surgical history, medications, allergies, family history, and social history were reviewed with the patient. No additional pertinent items.       Review of Systems  A complete review of systems was performed with pertinent positives and negatives noted in the HPI, and all other systems negative.    Physical Exam   BP: (!) 142/87  Pulse: 95  Temp: 98.8  F (37.1  C)  Resp: 16  SpO2: 99 %  Physical Exam  General: Alert, lying on the bed in NAD  Neuro: Awake alert; moving extremities x 4 face symterical  Eyes: sclera nonicteric conjunctiva clear  Mouth: mmm  Heart: RRR  Lungs:  CTA bilaterally  Abdomen:  soft  tenderness to palpation in the RUQ no rebound; no guarding +BS  Back: No CVA tenderness  Extremities: no pedal edema      ED Course   Differential diagnosis of patient's abdominal pain includes cholecystitis, choledocholithiasis, cholelithiasis, hepatitis, pancreatitis, gastritis, or other acute etiology.  Patient had GI cocktail which slightly improved her pain.  Ultrasound showed of the right upper quadrant showed hepatic steatosis, no evidence of cholecystitis, choledocholithiasis or cholelithiasis, and otherwise normal.  Lipase was normal.  CMP was remarkable for mild hyperglycemia.  White blood cell count is 12.  Urinalysis is pending.    Patient also stated that she was starting to have chest pain consistent with her anxiety in the past.  EKG shows normal sinus rhythm, no acute ST or T wave changes to suggest ischemia or infarction.  Normal QTC.  Patient's chest pain quickly resolved.    I do not believe the patient's pain represents a PE.    Given patient's history and physical exam, I do not believe patient is at risk for a PE at this time. Patient was evaluated using the eight PERC rules to determine if further work up for PE was needed.     1.  Patient  < than 50 years old   2.  HR is  < 100   3.  Room air oxygen sats are  > 94%   4.  No prior PE or DVT    5.  No recent surgery or trauma    6.  No hemoptysis,    7.   No exogenous estrogen   8.  Jane has no unilateral leg swelling   Since this patient does  have 0 of the PERC criteria, they  have a pretest probability of less than 2% for having a PE and therefore would not benefit from an ED evaluation for PE.     I do not believe this patient has a thoracic aortic dissection as the pain is no ripping or tearing it does not go to her back.  This patient does not have a history of poorly controlled high blood pressure.   I do not believe that her chest pain is secondary to pneumonia as this patient does bit have productive cough or fever and clear breath sounds on exam..  I do not believe her pain is due to esophageal rupture as there was no preceding  forceful vomitting or retching and the CXR did not show mediastinal free air.   I do not believe it is due to pericarditis as there is no evidence of NE depression on the electrocardiogram and the pain does not seem to be markedly changed with position.  I do not believe that this patient has referred pain from the ABD as her ABD exam is benign.   Patient initially stated she was going to kill herself by eating herself, but then later stated she did not want to commit suicide but just wanted to bite herself to feel the pain of her father dying.  And the pain that he felt.  However she subsequently stated she wanted to kill anyone around her.  We discussed plan with patient and she agreed to take medications tonight, we will reassess tomorrow morning.       Results for orders placed or performed during the hospital encounter of 09/18/22   Abdomen US, limited (RUQ only)     Status: None    Narrative    EXAM: US ABDOMEN LIMITED  LOCATION: [facility]  DATE/TIME: 9/18/2022 8:22 PM    INDICATION: n v 4 times per day, and RUQ pain  COMPARISON: 05/24/2022 CT AP and RUQ US 03/10/2022  TECHNIQUE: Limited abdominal  ultrasound.    FINDINGS:    GALLBLADDER: Normal. No gallstones, wall thickening, or pericholecystic fluid. Negative sonographic Santana's sign.    BILE DUCTS: No biliary dilatation. The common duct measures 4 mm.    LIVER: Mild diffuse hepatic steatosis. No focal mass.    RIGHT KIDNEY: No hydronephrosis.    PANCREAS: The visualized portions are normal.    No ascites.      Impression    IMPRESSION:  1.  Mild diffuse hepatic steatosis.  2.  Right upper quadrant abdominal ultrasound otherwise normal.       Comprehensive metabolic panel     Status: Abnormal   Result Value Ref Range    Sodium 142 133 - 144 mmol/L    Potassium 4.2 3.4 - 5.3 mmol/L    Chloride 112 (H) 94 - 109 mmol/L    Carbon Dioxide (CO2) 25 20 - 32 mmol/L    Anion Gap 5 3 - 14 mmol/L    Urea Nitrogen 15 7 - 30 mg/dL    Creatinine 0.77 0.52 - 1.04 mg/dL    Calcium 9.8 8.5 - 10.1 mg/dL    Glucose 106 (H) 70 - 99 mg/dL    Alkaline Phosphatase 71 40 - 150 U/L    AST 13 0 - 45 U/L    ALT 24 0 - 50 U/L    Protein Total 8.4 6.8 - 8.8 g/dL    Albumin 4.5 3.4 - 5.0 g/dL    Bilirubin Total 0.3 0.2 - 1.3 mg/dL    GFR Estimate >90 >60 mL/min/1.73m2   Lipase     Status: Normal   Result Value Ref Range    Lipase 174 73 - 393 U/L   CBC with platelets and differential     Status: Abnormal   Result Value Ref Range    WBC Count 12.0 (H) 4.0 - 11.0 10e3/uL    RBC Count 4.82 3.80 - 5.20 10e6/uL    Hemoglobin 13.8 11.7 - 15.7 g/dL    Hematocrit 40.2 35.0 - 47.0 %    MCV 83 78 - 100 fL    MCH 28.6 26.5 - 33.0 pg    MCHC 34.3 31.5 - 36.5 g/dL    RDW 13.1 10.0 - 15.0 %    Platelet Count 265 150 - 450 10e3/uL    % Neutrophils 66 %    % Lymphocytes 26 %    % Monocytes 6 %    % Eosinophils 1 %    % Basophils 1 %    % Immature Granulocytes 0 %    NRBCs per 100 WBC 0 <1 /100    Absolute Neutrophils 7.9 1.6 - 8.3 10e3/uL    Absolute Lymphocytes 3.1 0.8 - 5.3 10e3/uL    Absolute Monocytes 0.7 0.0 - 1.3 10e3/uL    Absolute Eosinophils 0.1 0.0 - 0.7 10e3/uL    Absolute Basophils 0.1 0.0  - 0.2 10e3/uL    Absolute Immature Granulocytes 0.0 <=0.4 10e3/uL    Absolute NRBCs 0.0 10e3/uL   Extra Tube     Status: None    Narrative    The following orders were created for panel order Extra Tube.  Procedure                               Abnormality         Status                     ---------                               -----------         ------                     Extra Blue Top Tube[[id number]]                              Final result               Extra Red Top Tube[[id number]]                               Final result                 Please view results for these tests on the individual orders.   Extra Blue Top Tube     Status: None   Result Value Ref Range    Hold Specimen JIC    Extra Red Top Tube     Status: None   Result Value Ref Range    Hold Specimen JIC    CBC with platelets differential     Status: Abnormal    Narrative    The following orders were created for panel order CBC with platelets differential.  Procedure                               Abnormality         Status                     ---------                               -----------         ------                     CBC with platelets and d...[[id number]]  Abnormal            Final result                 Please view results for these tests on the individual orders.     Medications   ondansetron (ZOFRAN ODT) ODT tab 4 mg (4 mg Oral Given 9/18/22 1813)   lidocaine (viscous) (XYLOCAINE) 2 % 15 mL, alum & mag hydroxide-simethicone (MAALOX) 15 mL GI Cocktail (30 mLs Oral Given 9/18/22 1900)        Assessments & Plan (with Medical Decision Making)   Suicidal Ideation  Chest pain  Abdominal Pain    Plan:  Reassessment in the AM    I have reviewed the nursing notes. I have reviewed the findings, diagnosis, plan and need for follow up with the patient.    New Prescriptions    No medications on file       Final diagnoses:   Suicidal ideation     I, Melisa Santos, am serving as a trained medical scribe to document services personally performed  by Sofi Michaels MD, based on the provider's statements to me.     I, Sofi Michaels MD, was physically present and have reviewed and verified the accuracy of this note documented by Melisa Santos.    --  Sofi Michaels MD  [facility] EMERGENCY DEPARTMENT  9/18/2022     Sofi Michaels MD  09/19/22 0102

## 2022-09-18 NOTE — CONSULTS
Diagnostic Evaluation Consultation  Crisis Assessment    Patient was assessed: In Person  Patient location: [facility] ED  Was a release of information signed: No. Reason: no parent/guardian present      Referral Data and Chief Complaint  Sadaf is a 21 year old, who uses she/her pronouns, and presents to the ED via EMS. Patient is referred to the ED by self. Patient is presenting to the ED for the following concerns: suicidal ideation.      Informed Consent and Assessment Methods     Patient is her own guardian. Writer met with patient and explained the crisis assessment process, including applicable information disclosures and limits to confidentiality, assessed understanding of the process, and obtained consent to proceed with the assessment. Patient was observed to be able to participate in the assessment as evidenced by being alert, oriented, and engaged in the assessment. Assessment methods included conducting a formal interview with patient, review of medical records, collaboration with medical staff, and obtaining relevant collateral information from family and community providers when available..     Over the course of this crisis assessment provided reassurance, offered validation, engaged patient in problem solving and disposition planning and worked with patient on safety and aftercare planning. Patient's response to interventions was calm and cooperative.      Summary of Patient Situation    Patient presents to ED via EMS w/ a history of Borderline Personality Disorder, Intellectual Disability, Generalized Anxiety Disorder, and chronic SI for concerns of suicidal ideation. Patient frequents the ED as a coping mechanism, and group home staff has explained to her this is not an appropriate use of the ED and has been working with her on using other coping skills. Group home staff state patient was having a good day and went shopping, but then called 911 on herself. They state that when EMS arrived, staff sent  them away and stated they could handle things. Patient then threw up her scheduled medications, so staff called 911 to have her transported to the ED. At the time of assessment, patient is tearful. She states that she did have a good day, but listened to one of her dad's favorite songs and she got very sad and didn't know what to do. Her father passed away in February of last year and patient has been struggling with using her coping skills to manage her grief. Patient denied plan. Denied HI, recent SIB, hallucinations, delusions or substance use.  and patient practiced deep breathing and grounding coping skills until patient was able to return to baseline and agreeable to returning to her group home. She was able to contract for safety.     Brief Psychosocial History     Patient lives in a group home with other three other residents. Mother and sister are supports as well as Aunt and uncle. Her father passed away in February of last year and she has been struggling significantly since with her grief and has difficulty using her coping skills. She is redirectable. Pt has limited peer relationships, is not working at this time and is receiving disability.  Pt is own guardian. Denies any relevant legal issues.    Significant Clinical History    Patient has a hx of Borderline Personality Disorder, Intellectual Disability, Generalized Anxiety Disorder, and chronic SI and SIB. Pt has had multiple ED visits for SI, HI, and SIB.  She has been admitted for inpatient MH with the last admission being in July, 2021  Pt has  and care team at [facility], 24/7 staffing at her group home including on site nursing, and medication management and therapy through [facility].      Collateral Information    Collateral information was collected by [facility]    The following information was received from Sophie whose relationship to the patient is group home staff. Information was obtained via  phone. Their phone number is [phone] and they last had contact with patient on today, 9/17/22.     What happened today: Sophie states that today, the pt was having a good day.  States that they went shopping this afternoon and she thought the pt was doing well.     What is different about patient's functioning: Sophie states that unbeknownst to her, the pt called 911.  Sophie states that when EMS showed up, the pt told them she was suicidal.  Sophie states that she and EMS talked with the pt and the decision was made not bring the pt to the hospital as she could keep herself safe.  Sophie states that once EMS left, the pt started throwing up, repeatedly, so she called 911 and requested the pt be brought to the ED.       Concern about alcohol/drug use: No     What do you think the patient needs: Sophie states she does not know what the pt needs but says that this is behavioral.     Has patient made comments about wanting to kill themselves/others:  Yes States the pt told her and EMS that she called 911 because she had suicidal ideation.  After talking with her and EMS, the pt was able to commit to safety.       If d/c is recommended, can they take part in safety/aftercare planning: Yes Group home staff is aware that the pt is frequently at the ED, including being seen yesterday for chest pain.  Sophie is aware that there is high likelihood that the pt will be returning to group home tonight.       Risk Assessment  ESS-6  1.a. Over the past 2 weeks, have you had thoughts of killing yourself? Yes  1.b. Have you ever attempted to kill yourself and, if yes, when did this last happen? No   2. Recent or current suicide plan? No   3. Recent or current intent to act on ideation? No  4. Lifetime psychiatric hospitalization? Yes  5. Pattern of excessive substance use? No  6. Current irritability, agitation, or aggression? No  Scoring note: BOTH 1a and 1b must be yes for it to score 1 point, if both  are not yes it is zero. All others are 1 point per number. If all questions 1a/1b - 6 are no, risk is negligible. If one of 1a/1b is yes, then risk is mild. If either question 2 or 3, but not both, is yes, then risk is automatically moderate regardless of total score. If both 2 and 3 are yes, risk is automatically high regardless of total score.      Score: 1, mild risk      Does the patient have access to lethal means? No     Does the patient engage in non-suicidal self-injurious behavior (NSSI/SIB)? Banging head, scratching arms, biting self     Does the patient have thoughts of harming others? No     Is the patient engaging in sexually inappropriate behavior?  no        Current Substance Abuse     Is there recent substance abuse? no     Was a urine drug screen or blood alcohol level obtained: No       Mental Status Exam     Affect: Blunted and Dramatic   Appearance: Appropriate    Attention Span/Concentration: Attentive  Eye Contact: Variable   Fund of Knowledge: Appropriate    Language /Speech Content: Fluent   Language /Speech Volume: Normal    Language /Speech Rate/Productions: Normal    Recent Memory: Intact   Remote Memory: Intact   Mood: Sad    Orientation to Person: Yes    Orientation to Place: Yes   Orientation to Time of Day: Yes    Orientation to Date: Yes    Situation (Do they understand why they are here?): Yes    Psychomotor Behavior: Normal    Thought Content: Suicidal   Thought Form: Intact      History of commitment: No    Medication    Psychotropic medications: Yes. Pt is currently taking the following medications listed below. Medication compliant: Yes. Recent medication changes: No  Medication changes made in the last two weeks: No    No current facility-administered medications for this encounter.     Current Outpatient Medications   Medication     acetaminophen (TYLENOL) 325 MG tablet     alum & mag hydroxide-simethicone (MAALOX  ES) 400-400-40 MG/5ML SUSP suspension     ARIPiprazole lauroxil  ER (ARISTADA) 882 MG/3.2ML intra-muscular     benztropine (COGENTIN) 1 MG tablet     calcium carbonate (TUMS) 500 MG chewable tablet     chlorhexidine (PERIDEX) 0.12 % solution     clobetasol (TEMOVATE) 0.05 % CREA cream     cyclobenzaprine (FLEXERIL) 10 MG tablet     diclofenac (VOLTAREN) 1 % topical gel     docusate sodium (COLACE) 100 MG capsule     fluticasone (FLONASE) 50 MCG/ACT nasal spray     guaiFENesin (ROBITUSSIN) 100 MG/5ML SYRP     hydrocortisone (CORTAID) 0.5 % external cream     hydrOXYzine (ATARAX) 50 MG tablet     ibuprofen (ADVIL/MOTRIN) 400 MG tablet     loperamide (IMODIUM) 2 MG capsule     LORazepam (ATIVAN) 0.5 MG tablet     metoclopramide (REGLAN) 10 MG tablet     multivitamin, therapeutic (THERA-VIT) TABS tablet     omeprazole (PRILOSEC) 40 MG DR capsule     ondansetron (ZOFRAN) 4 MG tablet     polyethylene glycol (MIRALAX) 17 g packet     prochlorperazine (COMPAZINE) 10 MG tablet     sennosides (SENOKOT) 8.6 MG tablet     traZODone (DESYREL) 50 MG tablet     venlafaxine (EFFEXOR XR) 75 MG 24 hr capsule     Vitamin D, Cholecalciferol, 25 MCG (1000 UT) TABS     Current Care Team     Primary Care Provider:Jess Wilkins MD, [facility]  Psychiatrist: [facility]  Therapist: No  : German Cheung [facility], [phone]     CTSS or ARMHS: No  ACT Team: No  Other: No     Diagnosis     1          Borderline personality disorder F60.3            - Primary, By History                2          Unspecified intellectual disability (intellectual developmental disorder) F79  - By History                3          Generalized anxiety disorder  F41.1 - By History    Clinical Summary and Substantiation of Recommendations    After therapeutic assessment, intervention and aftercare planning by ED care team and [facility] and in consultation with attending provider, the patient's circumstances and mental state were appropriate for outpatient management. It is the recommendation of this  clinician that pt discharge with OP MH support. A this time the pt is not presenting as an acute risk to self or others due to the following factors:  Patient endorses SI, but denies plan or intent. Denies HI, SIB, hallucinations, delusions or substance use. Pt's symptoms are consistent with baseline presentation. Pt has a number of supports already in place in including  with 24/7 staffing, case management team through [facility], psychiatric and therapy services through [facility] and [facility].  Recommendation is for patient to return to group home, practice using coping skills as an alternative to ED visits unless there is an emergency and continue following up with all current providers. Group home staff agreeable to patient returning.   Disposition    Recommended disposition: Group Home: Return to Group Home       Reviewed case and recommendations with attending provider. Attending Name: Dr. Becky Duggan       Attending concurs with disposition: Yes       Patient concurs with disposition: Yes       Guardian concurs with disposition: NA      Final disposition: Group home: Return to group home .     Outpatient Details (if applicable):   Aftercare plan and appointments placed in the AVS and provided to patient: Yes. Given to patient by ED Nursing Staff    Assessment Details    Patient interview started at: 7:00 pm and completed at: 7:30 pm.     Total duration spent on the patient case in minutes: .50 hrs      CPT code(s) utilized: 87743 - Psychotherapy (with patient) - 30 (16-37*) min       CHERI Brown, Psychotherapist Trainee, [facility]  DEC - Triage & Transition Services  Callback: [phone]      Aftercare Plan  If I am feeling unsafe or I am in a crisis, I will:   Contact my established care providers   Call the National Suicide Prevention Lifeline: 988  Go to the nearest emergency room   Call 911     Things I am able to do on my own or with others to cope or help me feel better: go for a walk, practice deep  "breathing, practice mindfulness and coping skills (listed below)    Changes I can make to support my mental health and wellness: adhere to treatment recommendations, practice coping skills, talk to my group home staff    People in my life that I can ask for help: group home staff, therapist and family    Your [facility] has a mental health crisis team you can call 24/7: [facility] Mobile Crisis  [phone]     Crisis Lines  Crisis Text Line  Text 572191  You will be connected with a trained live crisis counselor to provide support.    Por espanol, texto  SIRENA a 572140 o texto a 442-AYUDAME en WhatsApp    The [employer] (LGBTQ Youth Crisis Line)  1.892.658.8221  text START to 739-071      Community Resources  Fast Tracker  Linking people to mental health and substance use disorder resources  A[facility] PharmStat.NormOxys     [place] Mental Health Warm Line  Peer to peer support  Monday thru Saturday, 12 pm to 10 pm  211.587.6042 or 3.585.320.8029  Text \"Support\" to 93352    National [employer] Mental Illness (MAGY)  841.915.6348 or 1.888.MAGY.HELPS      Mental Health Apps  My3  https://RECESS.pp.org/    VirtualHopeBox  https://Gen110.org/apps/virtual-hope-box/      Additional Information  Today you were seen by a licensed mental health professional through Triage and Transition services, [facility] ([facility])  for a crisis assessment in the Emergency Department at [facility].  It is recommended that you follow up with your established providers (psychiatrist, mental health therapist, and/or primary care doctor - as relevant) as soon as possible. Coordinators from [facility] will be calling you in the next 24-48 hours to ensure that you have the resources you need.  You can also contact [facility] coordinators directly at [phone]. You may have been scheduled for or offered an appointment with a mental health provider. [facility] maintains an extensive network of licensed behavioral health " providers to connect patients with the services they need.  We do not charge providers a fee to participate in our referral network.  We match patients with providers based on a patient's specific needs, insurance coverage, and location.  Our first effort will be to refer you to a provider within your care system, and will utilize providers outside your care system as needed.      Grounding Techniques:    Try to notice where you are, your surroundings including the people, the sounds like the TV or radio.    Concentrate on your breathing. Take a deep cleansing breath from your diaphragm. Count the breaths as you exhale. Make sure you breath slowly.    Hold something that you find comforting, for some it may be a stuffed animal or a blanket. Notice how it feels in your hands. Is it hard or soft?    During a non-crisis time make a list of positive affirmations. Print them out and keep them handy for times of intense anxiety. At those times, read them aloud.  Try the [employer] game:    Name 5 things you can see in the room with you    Name 4 things you can feel ( chair on my back  or  feet on floor )     Name 3 things you can hear right now ( people talking  or  tv )     Name 2 things you can smell right now (or, 2 things you like the smell of)     Name 1 good thing about yourself  Create A Safe Place    Image a safe place -- it can be a real or imaginary place:     What do you see -- especially colors?     What sounds do you hear?     What sensations do you feel?     What smells do you smell?     What people or animals would you want in your safe place?     Imagine a protective bubble, wall or boundary around your safe place.     Imagine a door or gate with a guard at your safe place.     Image a lock and key to your safe place and only you can unlock it.    You can draw or make a collage that represents your safe place.     Choose a souvenir of your safe place -- a color, an object, a song.     Keep your image of your  safe place so you can come back to it when you need to.   Reduce Extreme Emotion  QUICKLY:  Changing Your Body Chemistry      T:  Change your body Temperature to change your autonomic nervous system   ? Use Ice Water to calm yourself down FAST   ? Put your face in a bowl of ice water (this is the best way; have the person keep his/her face in ice water for 30-45 seconds - initial research is showing that the longer s/he can hold her/his face in the water, the better the response), or   ? Splash ice water on your face, or hold an ice pack on your face      I:  Intensely exercise to calm down a body revved up by emotion   ? Examples: running, walking fast, jumping, playing basketball, weight lifting, swimming, calisthenics, etc.   ? Engage in exercises that DO NOT include violent behaviors. Exercises that utilize violent behaviors tend to function as  behavioral rehearsal,  and rather than calming the person down, may actually  rev  the person up more, increasing the likelihood of violence, and lessening the likelihood that they will  burn off  energy     P:  Progressively relax your muscles   ? Starting with your hands, moving to your forearms, upper arms, shoulders, neck, forehead, eyes, cheeks and lips, tongue and teeth, chest, upper back, stomach, buttocks, thighs, calves, ankles, feet   ? Tense (10 seconds,   of the way), then relax each muscle (all the way)   ? Notice the tension   ? Notice the difference when relaxed (by tensing first, and then relaxing, you are able to get a more thorough relaxation than by simply relaxing)     P: Paced breathing to relax   ? The standard technique is to begin with counting the number of steps one takes for a typical inhale, then counting the steps one takes for a typical exhale, and then lengthening the amount of steps for the exhalation by one or two steps.  OR  ? Repeat this pattern for 1-2 minutes  ? Inhale for four (4) seconds   ? Exhale for six (6) to eight (8) seconds    ? Research demonstrated that one can change one's overall level of anxiety by doing this exercise for even a few minutes per day

## 2022-09-19 ENCOUNTER — TELEPHONE (OUTPATIENT)
Dept: BEHAVIORAL HEALTH | Facility: CLINIC | Age: 22
End: 2022-09-19

## 2022-09-19 ENCOUNTER — HOSPITAL ENCOUNTER (EMERGENCY)
Facility: CLINIC | Age: 22
Discharge: NURSING, REHAB OR LONG-TERM CARE | End: 2022-09-21
Attending: EMERGENCY MEDICINE | Admitting: EMERGENCY MEDICINE
Payer: MEDICARE

## 2022-09-19 VITALS
OXYGEN SATURATION: 100 % | HEART RATE: 94 BPM | DIASTOLIC BLOOD PRESSURE: 91 MMHG | RESPIRATION RATE: 18 BRPM | SYSTOLIC BLOOD PRESSURE: 137 MMHG | TEMPERATURE: 98.8 F

## 2022-09-19 DIAGNOSIS — F31.9 BIPOLAR DISORDER, UNSPECIFIED (H): ICD-10-CM

## 2022-09-19 DIAGNOSIS — R45.850 HOMICIDAL IDEATION: ICD-10-CM

## 2022-09-19 DIAGNOSIS — Z11.52 ENCOUNTER FOR SCREENING LABORATORY TESTING FOR SEVERE ACUTE RESPIRATORY SYNDROME CORONAVIRUS 2 (SARS-COV-2): ICD-10-CM

## 2022-09-19 DIAGNOSIS — R45.851 SUICIDAL IDEATION: ICD-10-CM

## 2022-09-19 DIAGNOSIS — F79 UNSPECIFIED INTELLECTUAL DISABILITIES: ICD-10-CM

## 2022-09-19 DIAGNOSIS — F60.3 EXPLOSIVE PERSONALITY DISORDER (H): ICD-10-CM

## 2022-09-19 LAB
ALBUMIN UR-MCNC: 30 MG/DL
AMPHETAMINES UR QL SCN: NORMAL
AMPHETAMINES UR QL SCN: NORMAL
APPEARANCE UR: CLEAR
ATRIAL RATE - MUSE: 79 BPM
ATRIAL RATE - MUSE: 90 BPM
ATRIAL RATE - MUSE: 95 BPM
BARBITURATES UR QL: NORMAL
BARBITURATES UR QL: NORMAL
BENZODIAZ UR QL: NORMAL
BENZODIAZ UR QL: NORMAL
BILIRUB UR QL STRIP: NEGATIVE
CANNABINOIDS UR QL SCN: NORMAL
CANNABINOIDS UR QL SCN: NORMAL
COCAINE UR QL: NORMAL
COCAINE UR QL: NORMAL
COLOR UR AUTO: YELLOW
DIASTOLIC BLOOD PRESSURE - MUSE: NORMAL MMHG
GLUCOSE UR STRIP-MCNC: NEGATIVE MG/DL
HCG UR QL: NEGATIVE
HGB UR QL STRIP: ABNORMAL
INTERPRETATION ECG - MUSE: NORMAL
KETONES UR STRIP-MCNC: NEGATIVE MG/DL
LEUKOCYTE ESTERASE UR QL STRIP: NEGATIVE
MUCOUS THREADS #/AREA URNS LPF: PRESENT /LPF
NITRATE UR QL: NEGATIVE
OPIATES UR QL SCN: NORMAL
OPIATES UR QL SCN: NORMAL
P AXIS - MUSE: 38 DEGREES
P AXIS - MUSE: 39 DEGREES
P AXIS - MUSE: 50 DEGREES
PH UR STRIP: 5.5 [PH] (ref 5–7)
PR INTERVAL - MUSE: 170 MS
PR INTERVAL - MUSE: 174 MS
PR INTERVAL - MUSE: 190 MS
QRS DURATION - MUSE: 86 MS
QRS DURATION - MUSE: 88 MS
QRS DURATION - MUSE: 90 MS
QT - MUSE: 356 MS
QT - MUSE: 358 MS
QT - MUSE: 394 MS
QTC - MUSE: 433 MS
QTC - MUSE: 447 MS
QTC - MUSE: 451 MS
R AXIS - MUSE: 18 DEGREES
R AXIS - MUSE: 21 DEGREES
R AXIS - MUSE: 35 DEGREES
RBC URINE: 2 /HPF
SARS-COV-2 RNA RESP QL NAA+PROBE: NEGATIVE
SP GR UR STRIP: 1.04 (ref 1–1.03)
SQUAMOUS EPITHELIAL: 2 /HPF
SYSTOLIC BLOOD PRESSURE - MUSE: NORMAL MMHG
T AXIS - MUSE: 14 DEGREES
T AXIS - MUSE: 3 DEGREES
T AXIS - MUSE: 6 DEGREES
UROBILINOGEN UR STRIP-MCNC: NORMAL MG/DL
VENTRICULAR RATE- MUSE: 79 BPM
VENTRICULAR RATE- MUSE: 88 BPM
VENTRICULAR RATE- MUSE: 95 BPM
WBC URINE: 4 /HPF

## 2022-09-19 PROCEDURE — 250N000013 HC RX MED GY IP 250 OP 250 PS 637: Performed by: EMERGENCY MEDICINE

## 2022-09-19 PROCEDURE — 99285 EMERGENCY DEPT VISIT HI MDM: CPT | Mod: 25

## 2022-09-19 PROCEDURE — C9803 HOPD COVID-19 SPEC COLLECT: HCPCS

## 2022-09-19 PROCEDURE — 80307 DRUG TEST PRSMV CHEM ANLYZR: CPT | Performed by: EMERGENCY MEDICINE

## 2022-09-19 PROCEDURE — U0003 INFECTIOUS AGENT DETECTION BY NUCLEIC ACID (DNA OR RNA); SEVERE ACUTE RESPIRATORY SYNDROME CORONAVIRUS 2 (SARS-COV-2) (CORONAVIRUS DISEASE [COVID-19]), AMPLIFIED PROBE TECHNIQUE, MAKING USE OF HIGH THROUGHPUT TECHNOLOGIES AS DESCRIBED BY CMS-2020-01-R: HCPCS | Performed by: EMERGENCY MEDICINE

## 2022-09-19 PROCEDURE — 99285 EMERGENCY DEPT VISIT HI MDM: CPT | Performed by: EMERGENCY MEDICINE

## 2022-09-19 PROCEDURE — 81001 URINALYSIS AUTO W/SCOPE: CPT | Performed by: EMERGENCY MEDICINE

## 2022-09-19 PROCEDURE — 80307 DRUG TEST PRSMV CHEM ANLYZR: CPT | Mod: XU | Performed by: EMERGENCY MEDICINE

## 2022-09-19 PROCEDURE — 90791 PSYCH DIAGNOSTIC EVALUATION: CPT

## 2022-09-19 PROCEDURE — 81025 URINE PREGNANCY TEST: CPT | Performed by: EMERGENCY MEDICINE

## 2022-09-19 RX ORDER — METOCLOPRAMIDE 10 MG/1
10 TABLET ORAL EVERY 6 HOURS PRN
Status: DISCONTINUED | OUTPATIENT
Start: 2022-09-19 | End: 2022-09-21 | Stop reason: HOSPADM

## 2022-09-19 RX ORDER — CYCLOBENZAPRINE HCL 10 MG
10 TABLET ORAL 3 TIMES DAILY PRN
Status: DISCONTINUED | OUTPATIENT
Start: 2022-09-19 | End: 2022-09-21 | Stop reason: HOSPADM

## 2022-09-19 RX ORDER — TRAZODONE HYDROCHLORIDE 50 MG/1
50 TABLET, FILM COATED ORAL AT BEDTIME
Status: DISCONTINUED | OUTPATIENT
Start: 2022-09-19 | End: 2022-09-19 | Stop reason: HOSPADM

## 2022-09-19 RX ORDER — BENZTROPINE MESYLATE 1 MG/1
1 TABLET ORAL EVERY EVENING
Status: DISCONTINUED | OUTPATIENT
Start: 2022-09-19 | End: 2022-09-19 | Stop reason: HOSPADM

## 2022-09-19 RX ORDER — OLANZAPINE 10 MG/1
10 TABLET, ORALLY DISINTEGRATING ORAL ONCE
Status: COMPLETED | OUTPATIENT
Start: 2022-09-19 | End: 2022-09-19

## 2022-09-19 RX ORDER — HYDROXYZINE HYDROCHLORIDE 50 MG/1
50 TABLET, FILM COATED ORAL 2 TIMES DAILY PRN
Status: DISCONTINUED | OUTPATIENT
Start: 2022-09-19 | End: 2022-09-19 | Stop reason: HOSPADM

## 2022-09-19 RX ORDER — TRAZODONE HYDROCHLORIDE 50 MG/1
50 TABLET, FILM COATED ORAL AT BEDTIME
Status: DISCONTINUED | OUTPATIENT
Start: 2022-09-19 | End: 2022-09-21 | Stop reason: HOSPADM

## 2022-09-19 RX ORDER — ACETAMINOPHEN 325 MG/1
650 TABLET ORAL EVERY 6 HOURS PRN
Status: DISCONTINUED | OUTPATIENT
Start: 2022-09-19 | End: 2022-09-21 | Stop reason: HOSPADM

## 2022-09-19 RX ORDER — LORAZEPAM 1 MG/1
1 TABLET ORAL ONCE
Status: COMPLETED | OUTPATIENT
Start: 2022-09-19 | End: 2022-09-19

## 2022-09-19 RX ORDER — POLYETHYLENE GLYCOL 3350 17 G/17G
17 POWDER, FOR SOLUTION ORAL DAILY
COMMUNITY
End: 2024-04-21

## 2022-09-19 RX ORDER — LORAZEPAM 0.5 MG/1
0.5 TABLET ORAL
Status: DISCONTINUED | OUTPATIENT
Start: 2022-09-19 | End: 2022-09-21 | Stop reason: HOSPADM

## 2022-09-19 RX ORDER — PROCHLORPERAZINE MALEATE 10 MG
10 TABLET ORAL EVERY 6 HOURS PRN
Status: DISCONTINUED | OUTPATIENT
Start: 2022-09-19 | End: 2022-09-19 | Stop reason: HOSPADM

## 2022-09-19 RX ORDER — METOCLOPRAMIDE 10 MG/1
10 TABLET ORAL EVERY 6 HOURS PRN
COMMUNITY
End: 2022-12-21

## 2022-09-19 RX ORDER — SENNOSIDES 8.6 MG
1 TABLET ORAL 2 TIMES DAILY PRN
Status: DISCONTINUED | OUTPATIENT
Start: 2022-09-19 | End: 2022-09-21 | Stop reason: HOSPADM

## 2022-09-19 RX ORDER — DOCUSATE SODIUM 100 MG/1
100 CAPSULE, LIQUID FILLED ORAL DAILY
Status: DISCONTINUED | OUTPATIENT
Start: 2022-09-20 | End: 2022-09-21 | Stop reason: HOSPADM

## 2022-09-19 RX ORDER — PROMETHAZINE HYDROCHLORIDE 12.5 MG/1
12.5 TABLET ORAL EVERY 4 HOURS
COMMUNITY

## 2022-09-19 RX ORDER — BENZTROPINE MESYLATE 1 MG/1
1 TABLET ORAL EVERY EVENING
Status: DISCONTINUED | OUTPATIENT
Start: 2022-09-19 | End: 2022-09-21 | Stop reason: HOSPADM

## 2022-09-19 RX ORDER — PROMETHAZINE HYDROCHLORIDE 12.5 MG/1
12.5 TABLET ORAL EVERY 4 HOURS PRN
Status: DISCONTINUED | OUTPATIENT
Start: 2022-09-20 | End: 2022-09-21 | Stop reason: HOSPADM

## 2022-09-19 RX ORDER — LORAZEPAM 0.5 MG/1
0.5 TABLET ORAL ONCE
Status: COMPLETED | OUTPATIENT
Start: 2022-09-19 | End: 2022-09-19

## 2022-09-19 RX ORDER — VITAMIN B COMPLEX
1000 TABLET ORAL DAILY
Status: DISCONTINUED | OUTPATIENT
Start: 2022-09-20 | End: 2022-09-21 | Stop reason: HOSPADM

## 2022-09-19 RX ORDER — MULTIVITAMIN,THERAPEUTIC
1 TABLET ORAL DAILY
Status: DISCONTINUED | OUTPATIENT
Start: 2022-09-20 | End: 2022-09-21 | Stop reason: HOSPADM

## 2022-09-19 RX ORDER — CALCIUM CARBONATE 500 MG/1
500 TABLET, CHEWABLE ORAL 2 TIMES DAILY PRN
Status: DISCONTINUED | OUTPATIENT
Start: 2022-09-19 | End: 2022-09-21 | Stop reason: HOSPADM

## 2022-09-19 RX ORDER — VENLAFAXINE HYDROCHLORIDE 75 MG/1
225 CAPSULE, EXTENDED RELEASE ORAL DAILY
Status: DISCONTINUED | OUTPATIENT
Start: 2022-09-20 | End: 2022-09-21 | Stop reason: HOSPADM

## 2022-09-19 RX ORDER — PROCHLORPERAZINE MALEATE 10 MG
10 TABLET ORAL EVERY 6 HOURS PRN
Status: DISCONTINUED | OUTPATIENT
Start: 2022-09-19 | End: 2022-09-21 | Stop reason: HOSPADM

## 2022-09-19 RX ORDER — CHLORHEXIDINE GLUCONATE ORAL RINSE 1.2 MG/ML
15 SOLUTION DENTAL 2 TIMES DAILY
Status: DISCONTINUED | OUTPATIENT
Start: 2022-09-19 | End: 2022-09-21 | Stop reason: HOSPADM

## 2022-09-19 RX ORDER — IBUPROFEN 200 MG
400 TABLET ORAL 3 TIMES DAILY PRN
Status: DISCONTINUED | OUTPATIENT
Start: 2022-09-19 | End: 2022-09-21 | Stop reason: HOSPADM

## 2022-09-19 RX ORDER — POLYETHYLENE GLYCOL 3350 17 G/17G
17 POWDER, FOR SOLUTION ORAL DAILY
Status: DISCONTINUED | OUTPATIENT
Start: 2022-09-20 | End: 2022-09-21 | Stop reason: HOSPADM

## 2022-09-19 RX ORDER — LORAZEPAM 1 MG/1
1 TABLET ORAL ONCE
Status: DISCONTINUED | OUTPATIENT
Start: 2022-09-19 | End: 2022-09-19 | Stop reason: HOSPADM

## 2022-09-19 RX ORDER — CYCLOBENZAPRINE HCL 10 MG
10 TABLET ORAL 3 TIMES DAILY PRN
Status: DISCONTINUED | OUTPATIENT
Start: 2022-09-19 | End: 2022-09-19 | Stop reason: HOSPADM

## 2022-09-19 RX ORDER — OLANZAPINE 5 MG/1
5 TABLET, ORALLY DISINTEGRATING ORAL ONCE
Status: COMPLETED | OUTPATIENT
Start: 2022-09-19 | End: 2022-09-21

## 2022-09-19 RX ORDER — LOPERAMIDE HCL 2 MG
2 CAPSULE ORAL 4 TIMES DAILY PRN
Status: DISCONTINUED | OUTPATIENT
Start: 2022-09-19 | End: 2022-09-21 | Stop reason: HOSPADM

## 2022-09-19 RX ORDER — MAGNESIUM HYDROXIDE/ALUMINUM HYDROXICE/SIMETHICONE 120; 1200; 1200 MG/30ML; MG/30ML; MG/30ML
10 SUSPENSION ORAL EVERY 6 HOURS PRN
Status: DISCONTINUED | OUTPATIENT
Start: 2022-09-19 | End: 2022-09-21 | Stop reason: HOSPADM

## 2022-09-19 RX ORDER — VENLAFAXINE HYDROCHLORIDE 225 MG/1
225 TABLET, EXTENDED RELEASE ORAL DAILY
COMMUNITY
End: 2023-07-14

## 2022-09-19 RX ORDER — HYDROXYZINE HYDROCHLORIDE 50 MG/1
50 TABLET, FILM COATED ORAL 2 TIMES DAILY PRN
Status: DISCONTINUED | OUTPATIENT
Start: 2022-09-19 | End: 2022-09-21 | Stop reason: HOSPADM

## 2022-09-19 RX ORDER — ONDANSETRON 4 MG/1
4 TABLET, FILM COATED ORAL EVERY 8 HOURS PRN
Status: DISCONTINUED | OUTPATIENT
Start: 2022-09-19 | End: 2022-09-21 | Stop reason: HOSPADM

## 2022-09-19 RX ORDER — CLOBETASOL PROPIONATE 0.5 MG/G
CREAM TOPICAL 2 TIMES DAILY
Status: DISCONTINUED | OUTPATIENT
Start: 2022-09-19 | End: 2022-09-21 | Stop reason: HOSPADM

## 2022-09-19 RX ORDER — FLUTICASONE PROPIONATE 50 MCG
2 SPRAY, SUSPENSION (ML) NASAL DAILY
Status: DISCONTINUED | OUTPATIENT
Start: 2022-09-20 | End: 2022-09-21 | Stop reason: HOSPADM

## 2022-09-19 RX ADMIN — LORAZEPAM 0.5 MG: 0.5 TABLET ORAL at 04:52

## 2022-09-19 RX ADMIN — HYDROXYZINE HYDROCHLORIDE 50 MG: 50 TABLET, FILM COATED ORAL at 00:51

## 2022-09-19 RX ADMIN — LORAZEPAM 1 MG: 1 TABLET ORAL at 16:13

## 2022-09-19 RX ADMIN — CYCLOBENZAPRINE 10 MG: 10 TABLET, FILM COATED ORAL at 04:37

## 2022-09-19 RX ADMIN — OLANZAPINE 10 MG: 10 TABLET, ORALLY DISINTEGRATING ORAL at 12:38

## 2022-09-19 RX ADMIN — TRAZODONE HYDROCHLORIDE 50 MG: 50 TABLET ORAL at 00:51

## 2022-09-19 ASSESSMENT — ACTIVITIES OF DAILY LIVING (ADL)
ADLS_ACUITY_SCORE: 37

## 2022-09-19 NOTE — ED NOTES
Diagnostic Evaluation Consultation  Crisis Assessment    Patient was assessed: In Person  Patient location: [facility]  Was a release of information signed: No. Reason: patient was too distressed and tired to sign      Referral Data and Chief Complaint  Sadaf is a 21 year old, who uses she/her pronouns, and presents to the ED via ambulance. Patient is referred to the ED by self. Patient is presenting to the ED for the following concerns: crying, saying she was going to choke or stab her housemates.      Informed Consent and Assessment Methods     Patient is her own guardian. Writer met with patient and explained the crisis assessment process, including applicable information disclosures and limits to confidentiality. The patient was unable to participate in a formal crisis assessment due to crying, panic attacks, unable to talk, fatigue.  Due to this, assessment methods were limited to review of medical records, collaboration with medical staff, and obtaining relevant collateral information from family and community providers when available..     Over the course of this crisis assessment provided reassurance, offered validation, worked with patient on brief, therapeutic activity: processing recent loss of father, breathing and calming skills and assisted in processing patient's thoughts and feeling relating to loss of father/seeing father in the room with her. Patient's response to interventions was calming down, citing feeling like she wanted to choke someone, then not speaking and asking to sleep.     Summary of Patient Situation  {Include:   Patient returned early this morning from ED visit on 9/18.  She did not sleep and was up in the group home.  Group home employee Arlene Casey indicated client was agitated, talking about choking and stabbing housemates, and crying.  Pauline also reported that client met with [employer] worker and took a walk and still was upset when she returned so called 911.  Patient was present in  the ED the last several days and discharged to group home.  Presenting with same concerns of homicidal ideation, crying, seeing her father in person, feeling sad about loss of father and wanting to tell him she's sorry for mistreating him.  These symptoms have been present for several days.  She has attempted to resolve these symptoms by speaking with her [employer] [employer] [facility] worker.    When author attempted to assess her, she started getting agitated and crying loudly.  She shared that she is sad that her father has  and wants to tell him she is sorry for how she treated him.  She then shared she was seeing him in the room and started to have a panic attack.  She was unable to speak, sweating, shaking, and crying.  Attempted to calm patient, then she did not speak.  She finally said she was tired and needed to sleep.    Brief Psychosocial History  { Include:   She currently lives in a group home with other roomates. She is her own guardian.  Pauline shared that they have attempted to have her find her own guardian, but the process has not moved along.  She lost her father in February and feels very sad about this loss.  She does not work.  She typically enjoys her roomates and friends.      Significant Clinical History  { Include:   - Recent multiple ED visits in the last week.  She has diagnostic history of Bipolar I, Depression, Borderline Personality Disorder, and ADHD.  She has most often been discharged to work with psychiatrist at [facility] but reports she cancels appointments when she gets upset.  She was recently in patient in .       Collateral Information  Called group home and spoke to too Casey indicated client was agitated, talking about choking and stabbing housemates, and crying.  Pauline also reported that client met with [employer] [facility] worker and took a walk and still was upset when she returned so called 911.      Risk Assessment  ESS-6  1.a. Over the past 2 weeks, have you had thoughts of killing  yourself? No  1.b. Have you ever attempted to kill yourself and, if yes, when did this last happen? No   2. Recent or current suicide plan? No   3. Recent or current intent to act on ideation? No  4. Lifetime psychiatric hospitalization? Yes  5. Pattern of excessive substance use? No  6. Current irritability, agitation, or aggression? Yes  Scoring note: BOTH 1a and 1b must be yes for it to score 1 point, if both are not yes it is zero. All others are 1 point per number. If all questions 1a/1b - 6 are no, risk is negligible. If one of 1a/1b is yes, then risk is mild. If either question 2 or 3, but not both, is yes, then risk is automatically moderate regardless of total score. If both 2 and 3 are yes, risk is automatically high regardless of total score.      Score: 2, moderate risk      Does the patient have access to lethal means? Yes - describe group home does have knives in kitchen     Does the patient engage in non-suicidal self-injurious behavior (NSSI/SIB)? no     Does the patient have thoughts of harming others? Yes no intended victim just generally directed at roomates     Is the patient engaging in sexually inappropriate behavior?  no        Current Substance Abuse     Is there recent substance abuse? no     Was a urine drug screen or blood alcohol level obtained: Yes         Mental Status Exam     Affect: Appropriate   Appearance: Disheveled    Attention Span/Concentration: Inattentive  Eye Contact: Avoidant   Fund of Knowledge: Delayed    Language /Speech Content: Non-fluent   Language /Speech Volume: Mute    Language /Speech Rate/Productions: Slow    Recent Memory: Intact   Remote Memory: Intact   Mood: Sad    Orientation to Person: Answer: unsure as could not assess    Orientation to Place: Answer:  Answer: unsure as could not assess     Orientation to Time of Day: Answer:  Answer: unsure as could not assess      Orientation to Date: Answer:  Answer: unsure as could not assess      Situation (Do they  understand why they are here?): Answer:  Answer: unsure as could not assess      Psychomotor Behavior: Agitated    Thought Content: Hallucinations   Thought Form: Obsessive/Perseverative      History of commitment: No            Medication    Psychotropic medications: Yes. Pt is currently taking Lorazepam. Medication compliant: No: Reports not taking her medications and cancelling psychiatrist. Recent medication changes: No  Medication changes made in the last two weeks: No       Current Care Team    Primary Care Provider: No  Psychiatrist: Yes. Name: Dr. Emma Brower [phone]. Location:  . Date of last visit:  . Frequency:  . Perceived helpfulness:  .  Therapist: No  : No     [facility] or [facility]: [facility] worker visit was this morning 9/19 but she could not tell me the name  ACT Team: No  Other: No      Diagnosis    296.44 Bipolar I Disorder Current or Most Recent Episode Manic, with psycholtic features     Attention-Deficit/Hyperactivity Disorder  314.01 (F90.9) Unspecified Attention -Deficit / Hyperactivity Disorder - by history     Clinical Summary and Substantiation of Recommendations        Admission to Inpatient Level of Care is indicated due to:    1. Patient risk of severity of behavioral health disorder is appropriate to proposed level of care as indicated by:    Imminent Risk of Harm: Command auditory hallucinations or paranoid delusions contributing to risk of suicide or self harm  And/or:  Behavioral health disorder is present and appropriate for inpatient care with both of the following:     Severe psychiatric, behavioral or other comorbid conditions are appropriate for management at inpatient mental health as indicated by at least one of the following:   o Hallucinations of father in room, homicidal thoughts and access to means at group home    Severe dysfunction in daily living is present as indicated by at least one of the following:   o Extreme deterioration in social  interactions    2. Inpatient mental health services are necessary to meet patient needs and at least one of the following:  Specific condition related to admission diagnosis is present and judged likely to further improve at proposed level of care    3. Situation and expectations are appropriate for inpatient care, as indicated by one of the following:   Patient is unwilling to participate in treatment voluntarily and requires treatment    Disposition    Recommended disposition: Inpatient Mental Health       Reviewed case and recommendations with attending provider. Attending Name: Dr. Romero       Attending concurs with disposition: Yes       Patient concurs with disposition: Yes       Guardian concurs with disposition: NA      Final disposition: Inpatient mental health .     Inpatient Details (if applicable):   Is patient admitted voluntarily:Yes      Patient aware of potential for transfer if there is not appropriate placement? No       Patient is willing to travel outside of the [facility] for placement? No      Behavioral Intake Notified? Yes: Date: 9/19/22  Time: 1:15 PM.         Assessment Details    Patient interview started at: 11:45 A and completed at: 12:15 PM.     Total duration spent on the patient case in minutes: .50 hrs      CPT code(s) utilized: 43689 - Psychotherapy (with patient) - 30 (16-37*) min       MARIANNE Hull, MA, LMFT, LM  DEC - Triage & Transition Services  Callback: [phone]

## 2022-09-19 NOTE — ED NOTES
Emergency Department Patient Sign-out       Brief HPI:  This is a 21 year old female signed out to me by Dr. Michaels .  See initial ED Provider note for details of the presentation.            Significant Events prior to my assuming care: From  with SI and HI, no significant targets. Medical workup negative. Plan to observe overnight, will reassess in AM.      Exam:   Patient Vitals for the past 24 hrs:   BP Temp Temp src Pulse Resp SpO2   09/18/22 1641 (!) 142/87 98.8  F (37.1  C) Oral 95 16 99 %           ED RESULTS:   Results for orders placed or performed during the hospital encounter of 09/18/22 (from the past 24 hour(s))   Extra Tube     Status: None    Collection Time: 09/18/22  6:32 PM    Narrative    The following orders were created for panel order Extra Tube.  Procedure                               Abnormality         Status                     ---------                               -----------         ------                     Extra Blue Top Tube[[id number]]                              Final result               Extra Red Top Tube[[id number]]                               Final result                 Please view results for these tests on the individual orders.   Extra Blue Top Tube     Status: None    Collection Time: 09/18/22  6:32 PM   Result Value Ref Range    Hold Specimen JIC    Extra Red Top Tube     Status: None    Collection Time: 09/18/22  6:32 PM   Result Value Ref Range    Hold Specimen JIC    CBC with platelets differential     Status: Abnormal    Collection Time: 09/18/22  6:33 PM    Narrative    The following orders were created for panel order CBC with platelets differential.  Procedure                               Abnormality         Status                     ---------                               -----------         ------                     CBC with platelets and d...[[id number]]  Abnormal            Final result                 Please view results for these tests on the  individual orders.   Comprehensive metabolic panel     Status: Abnormal    Collection Time: 09/18/22  6:33 PM   Result Value Ref Range    Sodium 142 133 - 144 mmol/L    Potassium 4.2 3.4 - 5.3 mmol/L    Chloride 112 (H) 94 - 109 mmol/L    Carbon Dioxide (CO2) 25 20 - 32 mmol/L    Anion Gap 5 3 - 14 mmol/L    Urea Nitrogen 15 7 - 30 mg/dL    Creatinine 0.77 0.52 - 1.04 mg/dL    Calcium 9.8 8.5 - 10.1 mg/dL    Glucose 106 (H) 70 - 99 mg/dL    Alkaline Phosphatase 71 40 - 150 U/L    AST 13 0 - 45 U/L    ALT 24 0 - 50 U/L    Protein Total 8.4 6.8 - 8.8 g/dL    Albumin 4.5 3.4 - 5.0 g/dL    Bilirubin Total 0.3 0.2 - 1.3 mg/dL    GFR Estimate >90 >60 mL/min/1.73m2   Lipase     Status: Normal    Collection Time: 09/18/22  6:33 PM   Result Value Ref Range    Lipase 174 73 - 393 U/L   CBC with platelets and differential     Status: Abnormal    Collection Time: 09/18/22  6:33 PM   Result Value Ref Range    WBC Count 12.0 (H) 4.0 - 11.0 10e3/uL    RBC Count 4.82 3.80 - 5.20 10e6/uL    Hemoglobin 13.8 11.7 - 15.7 g/dL    Hematocrit 40.2 35.0 - 47.0 %    MCV 83 78 - 100 fL    MCH 28.6 26.5 - 33.0 pg    MCHC 34.3 31.5 - 36.5 g/dL    RDW 13.1 10.0 - 15.0 %    Platelet Count 265 150 - 450 10e3/uL    % Neutrophils 66 %    % Lymphocytes 26 %    % Monocytes 6 %    % Eosinophils 1 %    % Basophils 1 %    % Immature Granulocytes 0 %    NRBCs per 100 WBC 0 <1 /100    Absolute Neutrophils 7.9 1.6 - 8.3 10e3/uL    Absolute Lymphocytes 3.1 0.8 - 5.3 10e3/uL    Absolute Monocytes 0.7 0.0 - 1.3 10e3/uL    Absolute Eosinophils 0.1 0.0 - 0.7 10e3/uL    Absolute Basophils 0.1 0.0 - 0.2 10e3/uL    Absolute Immature Granulocytes 0.0 <=0.4 10e3/uL    Absolute NRBCs 0.0 10e3/uL   Abdomen US, limited (RUQ only)     Status: None    Collection Time: 09/18/22  8:22 PM    Narrative    EXAM: US ABDOMEN LIMITED  LOCATION: [facility]  DATE/TIME: 9/18/2022 8:22 PM    INDICATION: n v 4 times per day, and RUQ  pain  COMPARISON: 05/24/2022 CT AP and RUQ US 03/10/2022  TECHNIQUE: Limited abdominal ultrasound.    FINDINGS:    GALLBLADDER: Normal. No gallstones, wall thickening, or pericholecystic fluid. Negative sonographic Santana's sign.    BILE DUCTS: No biliary dilatation. The common duct measures 4 mm.    LIVER: Mild diffuse hepatic steatosis. No focal mass.    RIGHT KIDNEY: No hydronephrosis.    PANCREAS: The visualized portions are normal.    No ascites.      Impression    IMPRESSION:  1.  Mild diffuse hepatic steatosis.  2.  Right upper quadrant abdominal ultrasound otherwise normal.       Chest XR,  PA & LAT     Status: None    Collection Time: 09/18/22 11:50 PM    Narrative    EXAM: XR CHEST 2 VIEWS  LOCATION: [facility]  DATE/TIME: 9/18/2022 11:50 PM    INDICATION: chest pain, vomiting  COMPARISON: 6/7/2022      Impression    IMPRESSION: Negative chest.       ED MEDICATIONS:   Medications   prochlorperazine (COMPAZINE) tablet 10 mg (has no administration in time range)   traZODone (DESYREL) tablet 50 mg (has no administration in time range)   hydrOXYzine (ATARAX) tablet 50 mg (has no administration in time range)   omeprazole (priLOSEC) CR capsule 40 mg (has no administration in time range)   venlafaxine (EFFEXOR XR) 24 hr capsule 225 mg (has no administration in time range)   cyclobenzaprine (FLEXERIL) tablet 10 mg (has no administration in time range)   benztropine (COGENTIN) tablet 1 mg (has no administration in time range)   ondansetron (ZOFRAN ODT) ODT tab 4 mg (4 mg Oral Given 9/18/22 1813)   lidocaine (viscous) (XYLOCAINE) 2 % 15 mL, alum & mag hydroxide-simethicone (MAALOX) 15 mL GI Cocktail (30 mLs Oral Given 9/18/22 1900)         Impression:    ICD-10-CM    1. Suicidal ideation  R45.851        Plan:    Re assess in AM.        MD Joaquin Alvarado, Sourav Means MD  09/19/22 0049

## 2022-09-19 NOTE — CONSULTS
"Diagnostic Evaluation Consultation  Crisis Assessment    Patient was assessed: Mile  Patient location: [facility] ED  Was a release of information signed: Yes. Providers included on the release: Toñito Barton, Dr Wilkins, Dr Jacobson      Referral Data and Chief Complaint  Patient is a 21 year old, who uses she/her pronouns, and presents to the ED via EMS. Patient is referred to the ED by self. Patient is presenting to the ED for the following concerns: Patient comes from her group home due to SI with initial plans to eat herself or to hit her head on the wall.  She bit her hand, earlier.  She denied HI, but hours later she stated she had plans to kill random people.  She stated she hears voices telling her to kill herself and others.    Informed Consent and Assessment Methods     Patient is her own guardian. Writer met with patient and explained the crisis assessment process, including applicable information disclosures and limits to confidentiality, assessed understanding of the process, and obtained consent to proceed with the assessment. Patient was observed to be able to participate in the assessment as evidenced by alert and oriented presentation. Assessment methods included conducting a formal interview with patient, review of medical records, collaboration with medical staff, and obtaining relevant collateral information from family and community providers when available..     Over the course of this crisis assessment provided reassurance, offered validation, engaged patient in problem solving and disposition planning, worked with patient on safety and aftercare planning and assisted in processing patient's thoughts and feeling relating to supportive services. Patient's response to interventions was oppositional.     Summary of Patient Situation     Patient was brought in by EMS, after she called [facility] and they called 911.  She stated she had SI and that she has it, \"all the time.\"  She stated " "that biting herself was a suicide attempt.  She stated that she hears voices telling her to harm herself and others.  She had HI with a plan to \"cut necks open with a knife.\"  She did not have a specific person that she would harm in mind.  She was upset with group home staff because \"I was vomiting and staff wanted to argue with me and I couldn't take it.  I'm not thinking right, right now.  I have chest pain from stress.  My stressors are that I lost my Dad, last year; My bio-family won't talk to me, since my Dad .  I've been waking up, off and on.  I throw up when I eat because I have GI problems.\"    Brief Psychosocial History     Patient lives at her group home.    Significant Clinical History     Patient's prior diagnoses were Bipolar disorder, Unspecified Depression, Borderline Personality disorder, ADHD, and Intellectual Disability.  She stated that she's been missing medications doses due to vomiting.  She's had multiple inpatient stays.  She was last seen in the ED and then sent home, yesterday.  She has a PCP, Psychiatrist, Therapist, Personal Support Attendant, and ARMHS Worker.     Collateral Information  The following information was received from Arlene whose relationship to the patient is group home staff. Information was obtained via phone. Their phone number is # [phone] and they last had contact with patient on their last shift.     How long have they been a resident: Not stated    Why does patient live in the facility: Not stated specifically, but patient has Intellectual and Mental Health challenges.    Significant changes to environment: None    Legal status (Commitment, probation, guardian, etc.): Patient is her own guardian    Has the patient made any comments about wanting to kill themselves/others: \"All the time, especially when she has her period like now.  She mostly says she wants to run away, bite herself, kill herself.  She sometimes says she wants to kill others.  She says " "she hears voices.\"    What happened today: \"She's still acting up, the same as yesterday.  She called 911 and they came twice, but she barricades herself in her room and she tells them to go away.  Later, she called [facility] and they called 911 and she went with the ambulance.  She always says she wants to kill herself and she hears voices.  She makes herself throw up and then she won't take her medications.\"    What is different about patient's functioning: \"She's on her period and that's when this happens more.\"    Concern about alcohol/drug use: No    If d/c is recommended, can patient return to current living situation: \"Yes.\"      Additional information: Called Arlene back to make sure sharps are secured and she stated that the patient has no access to any knives or any other sharp objects.    Risk Assessment  ESS-6  1.a. Over the past 2 weeks, have you had thoughts of killing yourself? Yes  1.b. Have you ever attempted to kill yourself and, if yes, when did this last happen? Yes today by biting hand   2. Recent or current suicide plan? Yes bite self, this was intermittent.   3. Recent or current intent to act on ideation? Yes  4. Lifetime psychiatric hospitalization? Yes  5. Pattern of excessive substance use? No  6. Current irritability, agitation, or aggression? No  Scoring note: BOTH 1a and 1b must be yes for it to score 1 point, if both are not yes it is zero. All others are 1 point per number. If all questions 1a/1b - 6 are no, risk is negligible. If one of 1a/1b is yes, then risk is mild. If either question 2 or 3, but not both, is yes, then risk is automatically moderate regardless of total score. If both 2 and 3 are yes, risk is automatically high regardless of total score.      Score: 4, moderate risk      Does the patient have access to lethal means? No     Does the patient engage in non-suicidal self-injurious behavior (NSSI/SIB)? no     Does the patient have thoughts of harming others? Yes.  Does the " patient have a specific victim in mind? No Do they have a plan? No Do they have intent? No Is this a duty to warn situation?  no     Is the patient engaging in sexually inappropriate behavior?  no        Current Substance Abuse     Is there recent substance abuse? no     Was a urine drug screen or blood alcohol level obtained: No       Mental Status Exam     Affect: Appropriate   Appearance: Appropriate    Attention Span/Concentration: Attentive  Eye Contact: Engaged   Fund of Knowledge: Delayed    Language /Speech Content: Fluent and Non-fluent   Language /Speech Volume: Normal    Language /Speech Rate/Productions: Normal    Recent Memory: Variable   Remote Memory: Variable   Mood: Depressed and Sad    Orientation to Person: Yes    Orientation to Place: Yes   Orientation to Time of Day: Yes    Orientation to Date: Yes    Situation (Do they understand why they are here?): Yes    Psychomotor Behavior: Normal    Thought Content: Hallucinations, Homicidal and Suicidal   Thought Form: Intact      History of commitment: No    Medication    Psychotropic medications: Yes  Medication changes made in the last two weeks: No       Current Care Team    Primary Care Provider: Dr Calli Wilkins, [facility], [phone]  Psychiatrist: Emma Jacobson NP, [facility], [phone]  Therapist: Toñito Barton [facility], [phone]   : Yes, name not provided     CTSS or ARMHS: Yes, name not provided  ACT Team: No  Other: Personal Support Attendant, name not provided      Diagnosis    Other Unspecified and Specified Bipolar and Related Disorder 296.80 (F31.9) Unspecified Bipolar and Related Disorder   Intellectual Disabilites  319 (F79) Unspecified Intellectual Disability (Intellectual Developmental Disorder) - by history   301.83 (F60.3) Borderline Personality Disorder - by history   ADHD  Unspecified Depressive Disorder    Clinical Summary and Substantiation of Recommendations    After therapeutic assessment,  "intervention and aftercare planning by ED care team and [facility] and in consultation with attending provider, the patient's circumstances and mental state were appropriate for outpatient management. It is the recommendation of this clinician that pt discharge with OP MH support. A this time the pt is not presenting as an acute risk to self or others due to the following factors: Patient has been at baseline and she appears to use escalation of SI and HI as a way to cope, along with calling 911 and [facility].  She was provided with her medications, in the ED, and reassessed for discharge.  She has a full range of outpatient providers, who are available to her on a regular basis, and supportive services.  The patient told the nurse she was going to kill him, when he tried to take her vitals and prepare her for discharge.  Apparently, she escalates during discharge.  When this  let her know that she needs to let the nurse take her vitals and that she can not threaten him, she said, \"I'm going to kill you, too.\"  The ED MD talked to her and decided to give her Ativan, prior to discharge.  He stated that she had no homicide plan, means, or ways.  The ED MD, the ED nurse, this , and Arlene from the group home were all in agreement about this ED discharge plan.  Disposition    Recommended disposition: Individual Therapy, Medication Management and Group Home: ongoing       Reviewed case and recommendations with attending provider. Attending Name: Sofi Michaels MD       Attending concurs with disposition: Yes       Patient concurs with disposition: Yes       Guardian concurs with disposition: NA      Final disposition: Individual therapy , Medication management and Group home: established .     Outpatient Details (if applicable):   Aftercare plan and appointments placed in the AVS and provided to patient: Yes. Given to patient by RN    Was lethal means counseling provided as a part of aftercare planning? No; " "      Assessment Details    Patient interview started at: 2340 and completed at: 2425.     Total duration spent on the patient case in minutes: 1.25 hrs      CPT code(s) utilized: 25353 - Psychotherapy for Crisis - 60 (30-74*) min       Chloe Goins, [facility], [facility]  DEC - Triage & Transition Services  Callback: [phone]    Aftercare Plan  If I am feeling unsafe or I am in a crisis, I will:   Contact my established care providers   Call the [place] Suicide Prevention Lifeline: 988  Go to the nearest emergency room   Call 911      Things I am able to do on my own or with others to cope or help me feel better: go for a walk, practice deep breathing, practice mindfulness and coping skills (listed below)     Changes I can make to support my mental health and wellness: adhere to treatment recommendations, practice coping skills, talk to my group home staff     People in my life that I can ask for help: group home staff, therapist and family     Your [facility] has a mental health crisis team you can call 24/7: [facility] Mobile Crisis  [phone]      Crisis Lines  Crisis Text Line  Text 991709  You will be connected with a trained live crisis counselor to provide support.     Por espanol, texto  SIRENA a 878040 o texto a 442-AYUDAME en WhatsApp     The Mikey Project (LGBTQ Youth Crisis Line)  7.485.810.6086  text START to 978-225        Community Resources  Fast Tracker  Linking people to mental health and substance use disorder resources  fasttrackermn.org      [place] Mental Health Warm Line  Peer to peer support  Monday thru Saturday, 12 pm to 10 pm  885.499.4455 or 5.650.201.6020  Text \"Support\" to 53253     National [place] on Mental Illness (MAGY)  128.516.3261 or 1.888.MAGY.HELPS        Mental Health Apps  My3  https://mySignalpp.org/     VirtualHopeBox  https://Hstry.org/apps/virtual-hope-box/        Additional Information  Today you were seen by a licensed mental health " professional through Triage and Transition services, [facility] Providers ([facility])  for a crisis assessment in the Emergency Department at [facility].  It is recommended that you follow up with your established providers (psychiatrist, mental health therapist, and/or primary care doctor - as relevant) as soon as possible. Coordinators from [facility] will be calling you in the next 24-48 hours to ensure that you have the resources you need.  You can also contact [facility] coordinators directly at [phone]. You may have been scheduled for or offered an appointment with a mental health provider. [facility] maintains an extensive network of licensed behavioral health providers to connect patients with the services they need.  We do not charge providers a fee to participate in our referral network.  We match patients with providers based on a patient's specific needs, insurance coverage, and location.  Our first effort will be to refer you to a provider within your care system, and will utilize providers outside your care system as needed.

## 2022-09-19 NOTE — ED NOTES
Patient signed out to me at the end of my partner shift.  Patient was reassessed by behavioral medicine who felt the patient could go back to her group home.  At this time the patient will be sent back to the group home with follow-up per behavioral assessment.    Ayo Romero MD, yAo Schneider MD  09/19/22 0763

## 2022-09-19 NOTE — ED NOTES
Pt arrived to [facility] accompanied by 1:1 and transport staff. Patient crying inconsolably and directed to [facility]. Patient biting R hand near thumb. Patient asked to stop biting but would not. Skin remains intact with faint red jaw marks. Pt refused to have vitals taken. Dr. Sena is aware.

## 2022-09-19 NOTE — TELEPHONE ENCOUNTER
S: 1:18pm Monica w/ DEC called Intake w/ clinical on a 21/F present in the [facility] ER w/ psychosis.    B:  The pt is currently at the [facility] ER. Pt is endorsing AH/VH of seeing and hearing her father. Pt is endorsing HI towards her roommates at her group home; no one specific but she wants to choke or stab her roommate. Denies: SI, and SIB. Pt is irritable.     Guardian: Self    The pts MH Hx is as follows: Bipolar, ADHD, MDD    The pt denies using any substances.    The pt has been/not been IP for MH before. - November, 2021 at [facility].     The pt is Rx MH medications: Lorazepam. The pt is not complaint.     OP Services: Psychiatrist (Treva)    There is no concern for aggression this visit. There is concern for HI.     Per  the pt can ambulate independently.     Per  the pt is indep with ADLs.    The pt does not have any known medical concerns.     Covid needs to be collected.  Utox needs to be collected.  Pregnancy test needs to be collected    A: Vol; [facility] [employer].     R: The pt is currently in the [facility] ER awaiting bed placement.    1:23pm Pt has been added to the work-list. Intake waiting labs for bed placement. Intake working on identifying appropriate bed placement.

## 2022-09-19 NOTE — ED TRIAGE NOTES
Patient had roommate called for her to be picked up by paramedics to come specifically here, she is having homicidal thoughts of harming others. She states she is going to take a knife and jodie her group roommates around the house.      Triage Assessment     Row Name 09/19/22 1100       Triage Assessment (Adult)    Airway WDL WDL       Respiratory WDL    Respiratory WDL WDL       Skin Circulation/Temperature WDL    Skin Circulation/Temperature WDL WDL       Cardiac WDL    Cardiac WDL WDL       Peripheral/Neurovascular WDL    Peripheral Neurovascular WDL WDL       Cognitive/Neuro/Behavioral WDL    Cognitive/Neuro/Behavioral WDL WDL

## 2022-09-19 NOTE — ED NOTES
Spoke to Dorothy at group home. Client okay to return, someone in there 24/7 to let her in. Address same as demographics.

## 2022-09-19 NOTE — DISCHARGE INSTRUCTIONS
"Thank you for choosing [facility] for your care. It was a pleasure taking care of you today in our Emergency Department.     Please follow up with your therapist tomorrow as planned and psychiatrist in the next 1-2 weeks as planned. However, if you have continued worsening symptoms, please return to the emergency department.     Aftercare Plan  If I am feeling unsafe or I am in a crisis, I will:   Contact my established care providers   Call the National Suicide Prevention Lifeline: 988  Go to the nearest emergency room   Call 911      Things I am able to do on my own or with others to cope or help me feel better: go for a walk, practice deep breathing, practice mindfulness and coping skills (listed below), listen to music     Changes I can make to support my mental health and wellness: adhere to treatment recommendations, practice coping skills, talk to my group home staff     People in my life that I can ask for help: group home staff, therapist and family     Your [facility] has a mental health crisis team you can call 24/7: [facility] Mobile Crisis  [phone]      Crisis Lines  Crisis Text Line  Text 896049  You will be connected with a trained live crisis counselor to provide support.     Por espanol, texto  SIRENA a 164203 o texto a 442-AYUDAME en WhatsApp     The Mikey Project (LGBTQ Youth Crisis Line)  1.077.660.9123  text START to 803-923        Community Resources  Fast Tracker  Linking people to mental health and substance use disorder resources  fasttrackermn.org      [place] Mental Health Warm Line  Peer to peer support  Monday thru Saturday, 12 pm to 10 pm  318.768.5076 or 2.832.637.0135  Text \"Support\" to 43020     National [place] on Mental Illness (MAGY)  659.003.6183 or 1.888.MAGY.HELPS        Mental Health Apps  My3  https://my3app.org/     VirtualHopeBox  https://OneTrueFan.org/apps/virtual-hope-box/        Additional Information  Today you were " seen by a licensed mental health professional through Triage and Transition services, Behavior[facility] Healthcare Providers ([facility])  for a crisis assessment in the Emergency Department at [facility].  It is recommended that you follow up with your established providers (psychiatrist, mental health therapist, and/or primary care doctor - as relevant) as soon as possible. Coordinators from [facility] will be calling you in the next 24-48 hours to ensure that you have the resources you need.  You can also contact [facility] coordinators directly at [phone]. You may have been scheduled for or offered an appointment with a mental health provider. [facility] maintains an extensive network of licensed behavioral health providers to connect patients with the services they need.  We do not charge providers a fee to participate in our referral network.  We match patients with providers based on a patient's specific needs, insurance coverage, and location.  Our first effort will be to refer you to a provider within your care system, and will utilize providers outside your care system as needed.

## 2022-09-19 NOTE — ED PROVIDER NOTES
[facility] EMERGENCY DEPARTMENT ([facility])  9/19/22  History     Chief Complaint   Patient presents with     Homicidal     Suicidal     Hallucinations     Pt states seeing her dads spirit and wanting to kill herself as well as others.      The history is provided by the patient and medical records.     Sadaf Ross is a 21 year old female with a history of intellectual disability, borderline personality disorder as well as morbid obesity, bipolar affective disorder, insomnia suicidal and homicidal threats.  The patient was recently discharged not more than 3 hours ago from the ER to go back to her group home after she had been here overnight for an evaluation.  At that time the patient had continued to say that she was going to kill people because it would make her feel better. The patient was thought to be pretty much at her baseline by her behavioral evaluation and was sent back to the group home where she proceeded to threaten other people around the house and state that she was going to take a knife and jodie people in the house.  Patient was sent back to the ER at this time and is nonverbal and unwilling to communicate.    This part of the document was transcribed by Pacheco Amador Medical Scribe.    I have reviewed the Medications, Allergies, Past Medical and Surgical History, and Social History in the [employer] system.    PAST MEDICAL HISTORY:   Past Medical History:   Diagnosis Date     ADHD (attention deficit hyperactivity disorder)      Bipolar 1 disorder (H)      Borderline personality disorder (H)      Depression      Depressive disorder      Intellectual disability      Obesity      Syncope        PAST SURGICAL HISTORY:   Past Surgical History:   Procedure Laterality Date     APPENDECTOMY       APPENDECTOMY         Past medical history, past surgical history, medications, and allergies were reviewed with the patient. Additional pertinent items: None    FAMILY HISTORY:   Family History    Problem Relation Age of Onset     Diabetes Type 1 Father      Cancer Paternal Grandfather        SOCIAL HISTORY:   Social History     Tobacco Use     Smoking status: Current Every Day Smoker     Packs/day: 0.50     Years: 5.00     Pack years: 2.50     Types: Vaping Device     Smokeless tobacco: Never Used   Substance Use Topics     Alcohol use: No     Social history was reviewed with the patient. Additional pertinent items: None      Patient's Medications    ACETAMINOPHEN (TYLENOL) 325 MG TABLET    Take 650 mg by mouth every 6 hours as needed for mild pain    ALUM & MAG HYDROXIDE-SIMETHICONE (MAALOX  ES) 400-400-40 MG/5ML SUSP SUSPENSION    Take 10 mLs by mouth every 4 hours as needed for indigestion    ARIPIPRAZOLE LAUROXIL ER (ARISTADA) 882 MG/3.2ML INTRA-MUSCULAR    Inject 882 mg into the muscle every 30 days On the 22nd of each month    BENZTROPINE (COGENTIN) 1 MG TABLET    Take 1 mg by mouth every evening    CALCIUM CARBONATE (TUMS) 500 MG CHEWABLE TABLET    Take 1 chew tab by mouth 2 times daily as needed for heartburn     CHLORHEXIDINE (PERIDEX) 0.12 % SOLUTION    Swish and spit 15 mLs in mouth 2 times daily     CLOBETASOL (TEMOVATE) 0.05 % CREA CREAM    Apply 1 Application topically 2 times daily For up to 14 days    CYCLOBENZAPRINE (FLEXERIL) 10 MG TABLET    Take 10 mg by mouth 3 times daily as needed for muscle spasms    DICLOFENAC (VOLTAREN) 1 % TOPICAL GEL    Apply 2 g topically daily as needed for moderate pain    DOCUSATE SODIUM (COLACE) 100 MG CAPSULE    Take 100 mg by mouth 2 times daily     FLUTICASONE (FLONASE) 50 MCG/ACT NASAL SPRAY    Spray 2 sprays into both nostrils daily    GUAIFENESIN (ROBITUSSIN) 100 MG/5ML SYRP    Take 10 mLs by mouth 3 times daily as needed for cough    HYDROCORTISONE (CORTAID) 0.5 % EXTERNAL CREAM    Apply topically 3 times daily as needed for rash    HYDROXYZINE (ATARAX) 50 MG TABLET    Take 50 mg by mouth 2 times daily as needed for anxiety    IBUPROFEN (ADVIL/MOTRIN)  400 MG TABLET    Take 400 mg by mouth 3 times daily as needed for moderate pain    LOPERAMIDE (IMODIUM) 2 MG CAPSULE    Take 2 mg by mouth 4 times daily as needed for diarrhea    LORAZEPAM (ATIVAN) 0.5 MG TABLET    Take 0.5 mg by mouth once as needed for anxiety Give as needed any time she has the urge to call 911 or to visit the ER    METOCLOPRAMIDE (REGLAN) 10 MG TABLET    Take 1 tablet (10 mg) by mouth 2 times daily as needed (nausea)    MULTIVITAMIN, THERAPEUTIC (THERA-VIT) TABS TABLET    Take 1 tablet by mouth daily    OMEPRAZOLE (PRILOSEC) 40 MG DR CAPSULE    Take 40 mg by mouth daily before breakfast    ONDANSETRON (ZOFRAN) 4 MG TABLET    Take 4 mg by mouth every 8 hours as needed for nausea    POLYETHYLENE GLYCOL (MIRALAX) 17 G PACKET    Take 1 packet by mouth daily    PROCHLORPERAZINE (COMPAZINE) 10 MG TABLET    Take 10 mg by mouth every 6 hours as needed for nausea or vomiting    SENNOSIDES (SENOKOT) 8.6 MG TABLET    Take 1 tablet by mouth 2 times daily as needed for constipation    TRAZODONE (DESYREL) 50 MG TABLET    Take 50 mg by mouth At Bedtime    VENLAFAXINE (EFFEXOR XR) 75 MG 24 HR CAPSULE    Take 3 capsules by mouth daily    VITAMIN D, CHOLECALCIFEROL, 25 MCG (1000 UT) TABS    Take 1,000 Units by mouth daily           Allergies   Allergen Reactions     Penicillins Rash and Unknown        Review of Systems   Psychiatric/Behavioral: Positive for behavioral problems.     A complete review of systems was performed with pertinent positives and negatives noted in the HPI, and all other systems negative.    Physical Exam   BP: 119/81  Pulse: 85  Temp: 98.7  F (37.1  C)  Resp: 16  SpO2: 97 %      Physical Exam  Vitals and nursing note reviewed.   HENT:      Head: Atraumatic.   Eyes:      Extraocular Movements: Extraocular movements intact.      Pupils: Pupils are equal, round, and reactive to light.   Pulmonary:      Effort: No respiratory distress.   Musculoskeletal:         General: No deformity.       Cervical back: Neck supple.   Neurological:      General: No focal deficit present.      Mental Status: She is alert and oriented to person, place, and time.   Psychiatric:      Comments: Flat with poor eye contact         ED Course   11:26 AM  The patient was seen and examined by Dr. Ayo Romero in Room HW05.        Procedures          Results for orders placed or performed during the hospital encounter of 09/19/22 (from the past 24 hour(s))   Urine Drugs of Abuse Screen    Narrative    The following orders were created for panel order Urine Drugs of Abuse Screen.  Procedure                               Abnormality         Status                     ---------                               -----------         ------                     Drug abuse screen 1 urin...[[id number]]                                                   Please view results for these tests on the individual orders.     Medications   OLANZapine zydis (zyPREXA) ODT tab 10 mg (10 mg Oral Given 9/19/22 1238)      Orders Placed This Encounter   Procedures     HCG qualitative urine     Asymptomatic COVID-19 Virus (Coronavirus) by PCR     Drug abuse screen 1 urine (ED)     Urine Drugs of Abuse Screen            Assessments & Plan (with Medical Decision Making)     I have reviewed the nursing notes.    Patient feels the discharge planning and plan from 3 hours prior to her rearrival to the our emergency department.  Patient at this time will be reassessed and will be hospitalized.        Final diagnoses:   Homicidal ideation   Suicidal ideation     Admit MH    Pacheco MCGINNIS, am serving as a trained medical scribe to document services personally performed by Ayo Romero MD, based on the provider's statements to me.      Ayo MCGINNIS MD, was physically present and have reviewed and verified the accuracy of this note documented by Pacheco Amador.      Ayo Romero MD, MD  9/19/2022   [facility] EMERGENCY DEPARTMENT      Ayo Romero MD  09/19/22 1878

## 2022-09-19 NOTE — PHARMACY-ADMISSION MEDICATION HISTORY
Admission Medication History Completed by Pharmacy    See [facility] Admission Navigator for allergy information, preferred outpatient pharmacy, prior to admission medications and immunization status.     Medication History Sources:     Patient not interviewed as a part of this Medication History     [facility] MAR     Chart Review     Changes made to PTA medication list (reason):    Added: per MAR   o Hyoscyamine 0.125 mg sublingual tablet   o Promethazine 12.5 mg oral tablet     Deleted: None    Changed: None    Additional Information:    Aripiprazole IM injection is given on the 22nd of each month per MAR    Prior to Admission medications    Medication Sig Last Dose Taking? Auth Provider Long Term End Date   acetaminophen (TYLENOL) 325 MG tablet Take 650 mg by mouth every 6 hours as needed for mild pain  Yes Unknown, Entered By History     alum & mag hydroxide-simethicone (MAALOX  ES) 400-400-40 MG/5ML SUSP suspension Take 10 mLs by mouth every 6 hours as needed for indigestion 9/18/2022 at 1900 Yes Unknown, Entered By History     ARIPiprazole lauroxil ER (ARISTADA) 882 MG/3.2ML intra-muscular Inject 882 mg into the muscle every 28 days On the 22nd of each month 8/22/2022 Yes Unknown, Entered By History     benztropine (COGENTIN) 1 MG tablet Take 1 mg by mouth every evening  Yes Unknown, Entered By History Yes    calcium carbonate (TUMS) 500 MG chewable tablet Take 1 chew tab by mouth 2 times daily as needed for heartburn   Yes Reported, Patient     chlorhexidine (PERIDEX) 0.12 % solution Swish and spit 15 mLs in mouth 2 times daily   Yes Reported, Patient     clobetasol (TEMOVATE) 0.05 % CREA cream Apply 1 Application topically 2 times daily For up to 14 days  Yes Unknown, Entered By History     cyclobenzaprine (FLEXERIL) 10 MG tablet Take 10 mg by mouth 3 times daily as needed for muscle spasms 9/19/2022 at 0500 Yes Unknown, Entered By History     diclofenac (VOLTAREN) 1 % topical gel Apply 2 g  topically daily as needed for moderate pain  Yes Unknown, Entered By History     docusate sodium (COLACE) 50 MG capsule Take 100 mg by mouth daily  Yes Reported, Patient     fluticasone (FLONASE) 50 MCG/ACT nasal spray Spray 2 sprays into both nostrils daily  Yes Unknown, Entered By History     guaiFENesin (ROBITUSSIN) 100 MG/5ML SYRP Take 10 mLs by mouth 3 times daily as needed for cough  Yes Unknown, Entered By History     hydrocortisone (CORTAID) 0.5 % external cream Apply topically 3 times daily as needed for rash  Yes Unknown, Entered By History     hydrOXYzine (ATARAX) 50 MG tablet Take 50 mg by mouth 2 times daily as needed for anxiety 9/19/2022 at 0100 Yes Reported, Patient     hyoscyamine (LEVSIN/SL) 0.125 MG sublingual tablet Place 0.125 mg under the tongue every 4 hours as needed (nausea)  Yes Reported, Patient     ibuprofen (ADVIL/MOTRIN) 400 MG tablet Take 400 mg by mouth 3 times daily as needed for moderate pain  Yes Unknown, Entered By History     loperamide (IMODIUM) 2 MG capsule Take 2 mg by mouth 4 times daily as needed for diarrhea  Yes Unknown, Entered By History     LORazepam (ATIVAN) 0.5 MG tablet Take 0.5 mg by mouth once as needed for anxiety Give as needed any time she has the urge to call 911 or to visit the ER 9/19/2022 at 0500 Yes Unknown, Entered By History     metoclopramide (REGLAN) 10 MG tablet Take 10 mg by mouth every 6 hours as needed  Yes Reported, Patient     multivitamin, therapeutic (THERA-VIT) TABS tablet Take 1 tablet by mouth daily  Yes Unknown, Entered By History     omeprazole (PRILOSEC) 40 MG DR capsule Take 40 mg by mouth daily before breakfast  Yes Reported, Patient     ondansetron (ZOFRAN) 4 MG tablet Take 4 mg by mouth every 8 hours as needed for nausea 9/18/2022 at 1800 Yes Unknown, Entered By History     polyethylene glycol (MIRALAX) 17 GM/Dose powder Take 17 g by mouth daily  Yes Reported, Patient No    prochlorperazine (COMPAZINE) 10 MG tablet Take 10 mg by mouth  every 6 hours as needed for nausea or vomiting  Yes Reported, Patient     promethazine (PHENERGAN) 12.5 MG tablet Take 12.5 mg by mouth every 4 hours  Yes Reported, Patient     sennosides (SENOKOT) 8.6 MG tablet Take 1 tablet by mouth 2 times daily as needed for constipation  Yes Unknown, Entered By History     traZODone (DESYREL) 50 MG tablet Take 50 mg by mouth At Bedtime 9/19/2022 at 0100 Yes Unknown, Entered By History     venlafaxine (EFFEXOR-ER) 225 MG 24 hr tablet Take 225 mg by mouth daily  Yes Reported, Patient No    Vitamin D, Cholecalciferol, 25 MCG (1000 UT) TABS Take 1,000 Units by mouth daily   Yes Reported, Patient           Date completed: 09/19/22    Medication history completed by: Delia Henao - Pharmacy Intern

## 2022-09-19 NOTE — ED NOTES
Bed: HW05  Expected date: 9/19/22  Expected time:   Means of arrival:   Comments:  N712 20 y/o F SI

## 2022-09-20 ENCOUNTER — TELEPHONE (OUTPATIENT)
Dept: BEHAVIORAL HEALTH | Facility: CLINIC | Age: 22
End: 2022-09-20

## 2022-09-20 PROCEDURE — 250N000013 HC RX MED GY IP 250 OP 250 PS 637: Performed by: EMERGENCY MEDICINE

## 2022-09-20 RX ADMIN — THERA TABS 1 TABLET: TAB at 09:44

## 2022-09-20 RX ADMIN — VENLAFAXINE HYDROCHLORIDE 225 MG: 75 CAPSULE, EXTENDED RELEASE ORAL at 09:45

## 2022-09-20 RX ADMIN — HYDROXYZINE HYDROCHLORIDE 50 MG: 50 TABLET, FILM COATED ORAL at 17:09

## 2022-09-20 RX ADMIN — BENZTROPINE MESYLATE 1 MG: 1 TABLET ORAL at 21:26

## 2022-09-20 RX ADMIN — FLUTICASONE PROPIONATE 2 SPRAY: 50 SPRAY, METERED NASAL at 09:47

## 2022-09-20 RX ADMIN — CLOBETASOL PROPIONATE: 0.5 CREAM TOPICAL at 21:26

## 2022-09-20 RX ADMIN — POLYETHYLENE GLYCOL 3350 17 G: 17 POWDER, FOR SOLUTION ORAL at 09:44

## 2022-09-20 RX ADMIN — Medication 1000 UNITS: at 09:44

## 2022-09-20 RX ADMIN — OMEPRAZOLE 40 MG: 20 CAPSULE, DELAYED RELEASE ORAL at 09:47

## 2022-09-20 RX ADMIN — TRAZODONE HYDROCHLORIDE 50 MG: 50 TABLET ORAL at 21:26

## 2022-09-20 RX ADMIN — DOCUSATE SODIUM 100 MG: 100 CAPSULE, LIQUID FILLED ORAL at 09:44

## 2022-09-20 RX ADMIN — CHLORHEXIDINE GLUCONATE 15 ML: 1.2 SOLUTION ORAL at 21:26

## 2022-09-20 RX ADMIN — ACETAMINOPHEN 650 MG: 325 TABLET, FILM COATED ORAL at 09:51

## 2022-09-20 ASSESSMENT — ACTIVITIES OF DAILY LIVING (ADL)
ADLS_ACUITY_SCORE: 37

## 2022-09-20 NOTE — TELEPHONE ENCOUNTER
R:  9/20/22 11:20 AM  Patient RV ED awaiting IP MH placement.  [facility] only.  Bed search completed:      [facility] IP MH no available beds     [facility] is posting 0 beds.      [facility] is posting 0 beds.     [facility] is posting 0 beds.     [facility] is posting 0 beds.     [facility] is posting 0 beds.   Patient will remain on adult work list.

## 2022-09-20 NOTE — ED NOTES
[facility] ED Mental Health Handoff Note:       Brief HPI:  This is a 21 year old female signed out to me by Dr. Romero.  See initial ED Provider note for full details of the presentation.  Patient is suicidal.  Plan for mental health admit.    Home meds reviewed and ordered/administered: No, ordered by me.    Medically stable for inpatient mental health admission: Yes.    Evaluated by mental health: Yes. The recommendation is for inpatient mental health treatment. Bed search in process    Safety concerns: At the time I received sign out, there were no safety concerns.    Hold Status:  Active Orders   N/A         Exam:   Patient Vitals for the past 24 hrs:   BP Temp Temp src Pulse Resp SpO2   09/19/22 1607 (!) 148/79 -- -- 84 18 100 %   09/19/22 1110 119/81 98.7  F (37.1  C) Oral 85 16 97 %           ED Course:    Medications   OLANZapine zydis (zyPREXA) ODT tab 5 mg (5 mg Oral Not Given 9/19/22 2146)   OLANZapine zydis (zyPREXA) ODT tab 10 mg (10 mg Oral Given 9/19/22 1238)   LORazepam (ATIVAN) tablet 1 mg (1 mg Oral Given 9/19/22 1613)            There were no significant events during my shift.    Patient was signed out to the oncoming provider, Dr. Manriquez      Impression:    ICD-10-CM    1. Homicidal ideation  R45.850    2. Suicidal ideation  R45.851        Plan:    1. Awaiting inpatient mental health admission/transfer.      RESULTS:   Results for orders placed or performed during the hospital encounter of 09/19/22 (from the past 24 hour(s))   Asymptomatic COVID-19 Virus (Coronavirus) by PCR Nasopharyngeal     Status: Normal    Collection Time: 09/19/22 12:02 PM    Specimen: Nasopharyngeal; Swab   Result Value Ref Range    SARS CoV2 PCR Negative Negative    Narrative    Testing was performed using the Xpert Xpress SARS-CoV-2 Assay on the   [employer]. Additional information about   this Emergency Use Authorization (EUA) assay can be found via the Lab   Guide. This test should  be ordered for the detection of SARS-CoV-2 in   individuals who meet SARS-CoV-2 clinical and/or epidemiological   criteria. Test performance is unknown in asymptomatic patients. This   test is for in vitro diagnostic use under the FDA EUA for   laboratories certified under CLIA to perform high complexity testing.   This test has not been FDA cleared or approved. A negative result   does not rule out the presence of PCR inhibitors in the specimen or   target RNA in concentration below the limit of detection for the   assay. The possibility of a false negative should be considered if   the patient's recent exposure or clinical presentation suggests   COVID-19. This test was validated by the [facility]. This laboratory is certified under the Clinical Laboratory Improvement Amendments of 1988 (CLIA-88) as qualified to perform high complexity laboratory testing.     Urine Drugs of Abuse Screen     Status: Normal    Collection Time: 09/19/22 12:51 PM    Narrative    The following orders were created for panel order Urine Drugs of Abuse Screen.  Procedure                               Abnormality         Status                     ---------                               -----------         ------                     Drug abuse screen 1 urin...[[id number]]  Normal              Final result                 Please view results for these tests on the individual orders.   HCG qualitative urine     Status: Normal    Collection Time: 09/19/22 12:51 PM   Result Value Ref Range    hCG Urine Qualitative Negative Negative   Drug abuse screen 1 urine (ED)     Status: Normal    Collection Time: 09/19/22 12:51 PM   Result Value Ref Range    Amphetamines Urine Screen Negative Screen Negative    Barbiturates Urine Screen Negative Screen Negative    Benzodiazepines Urine Screen Negative Screen Negative    Cannabinoids Urine Screen Negative Screen Negative    Cocaine Urine Screen Negative Screen Negative     Opiates Urine Screen Negative Screen Negative             DO Nam Schmitt Dustin Nickolas, DO  09/19/22 2324       Ghassan Browning,   09/19/22 7302

## 2022-09-20 NOTE — ED NOTES
"Triage & Transition Services, Extended Care     Therapy Progress Note    Patient: Sadaf goes by \"Sadaf,\" uses she/her pronouns  Date of Service: 2022  Site of Service:  ED  Patient was seen in-person.     Presenting problem:   Sadaf is followed related to Long wait time for admission: 23+ hours in ED. Please see initial DEC/[facility] Crisis Assessment completed by MARIANNE Parra on 22 for complete assessment information. Notable concerns include homicidal ideation, command hallucinations, visual hallucinations, self harm.     Individuals Present: Sadaf & Padmini CANO Salguero    Session start: 954am  Session end: 1010am  Session duration in minutes: 16  Session number: 1  Anticipated number of sessions or this episode of care: 1-3  CPT utilized: 45104 - Psychotherapy (with patient) - 30 (16-37*) min        Current Presentation:   Sadaf presents as cooperative and anxious. She requested to walk around the ED while talking with this clinician. She reports that she is doing \"the same as yesterday. I want to punch things\" because \"my dad used to punch things. My dad  in February and he would punch things too\". aSdaf discusses how she feels loved at her group home and how she loves the people at her group home as well. She would like to return there once she leaves the hospital.     Sadaf reported that she was feeling light headed, so we returned to her room. She grabbed a plastic fork and pauses, and then states \"I need to set this down or I am going to stab myself with it\". She states that she hears voices that are telling her to kill herself and kill other people. She begins crying and states that she is having urges to bite herself and hit herself in the head. She continues crying and states that she wants to get rid of the voices \"I am a nice person, I don't want to hurt people. But I have before and I went to [facility]. I want these voices to stop\"     This clinician and Sadaf " "practiced grounding and breathing exercises. We discussed grief counseling and she reports that she needs to be here to \"get help\" before going back to the group home. She is willing to remain in the hospital for inpatient admission. She is willing to adjust medications if recommended.        Mental Status Exam:   Appearance: appeared younger than stated age and fatigued  Attitude: cooperative  Eye Contact: fair, looking around room  Mood: anxious and sad   Affect: mood congruent and intensity is blunted  Speech: clear, coherent and soft  Psychomotor Behavior: no evidence of tardive dyskinesia, dystonia, or tics  Thought Process:  logical and linear  Associations: no loose associations  Thought Content: thoughts of self-harm, which are remained the same and auditory hallucinations present  Insight: limited  Judgement: limited  Oriented to: time, person, and place  Attention Span and Concentration: fair  Recent and Remote Memory: fair    Diagnosis:   296.44 Bipolar I Disorder Current or Most Recent Episode Manic, with psycholtic features      Attention-Deficit/Hyperactivity Disorder  314.01 (F90.9) Unspecified Attention -Deficit / Hyperactivity Disorder - by history    Therapeutic Intervention(s):   Provided active listening, unconditional positive regard, and validation. Coached on coping techniques/relaxation skills to help improve distress tolerance and managing intense emotions.    Treatment Objective(s) Addressed:   The focus of this session was on rapport building, identifying and practicing coping strategies and assessing safety .     Progress Towards Goals:   Patient reports stable symptoms. Patient is not making progress towards treatment goals as evidenced by reporting that she feels the same as yesterday, continues to have HI and SI urges, continues to report command auditory hallucinations.     General Recommendations:   Continue to monitor for harm. Consider: Use a positive, direct and calm approach. " Pt's tend to match the energy/mood of the staff. Keep focus positive and upbeat, Be firm but gentle when redirecting and Listen in a neutral, non-judgmental way. Offer reassurance    Plan:   Pt will remain in ED waiting for inpatient admission to mental health unit due to continued command auditory hallucinations, HI, and SI. Pt is voluntary for admission.      Plan for Care reviewed with Assigned Medical Provider? Yes. Provider: Dr. Rocha. Response: in agreement with inpatient admission     Padmini Salguero   Licensed Mental Health Professional (LMHP), [facility]  [phone]

## 2022-09-20 NOTE — TELEPHONE ENCOUNTER
R:      Bed search update ([facility]) @ 03:18:       [facility]: @ cap per website      [facility]: @ cap per website      [facility]: @ cap per website      [facility]: @ cap per website      [facility]: @ cap per website      [facility]: @ cap per website    [place]: @ cap per website    Pt remains on work list until appropriate placement is available

## 2022-09-20 NOTE — ED PROVIDER NOTES
[facility] ED Mental Health Handoff Note:       Brief HPI:  This is a 21 year old female signed out to me by Dr. Manriquez.  See initial ED Provider note for full details of the presentation. Interval history is pertinent for no acute issues.  Patient is in the room calm and cooperative.  She does not have any complaints.  Patient has a history of intellectual disability borderline personality and bipolar disease.  Patient was initially seen for suicidal and homicidal thoughts.        Home meds reviewed and ordered/administered: Yes    Medically stable for inpatient mental health admission: Yes.    Evaluated by mental health: Yes. The recommendation is for inpatient mental health treatment. Bed search in process    Safety concerns: At the time I received sign out, there were no safety concerns.    Hold Status:  Active Orders   N/A            Exam:   Patient Vitals for the past 24 hrs:   BP Temp Temp src Pulse Resp SpO2   09/20/22 0933 (!) 148/100 98  F (36.7  C) Oral 85 16 100 %   09/19/22 1607 (!) 148/79 -- -- 84 18 100 %   09/19/22 1110 119/81 98.7  F (37.1  C) Oral 85 16 97 %           ED Course:    Medications   OLANZapine zydis (zyPREXA) ODT tab 5 mg (5 mg Oral Not Given 9/19/22 1800)   alum & mag hydroxide-simethicone (MAALOX) suspension 10 mL (has no administration in time range)   acetaminophen (TYLENOL) tablet 650 mg (650 mg Oral Given 9/20/22 0951)   benztropine (COGENTIN) tablet 1 mg (1 mg Oral Not Given 9/20/22 0156)   calcium carbonate (TUMS) chewable tablet 500 mg (has no administration in time range)   chlorhexidine (PERIDEX) 0.12 % solution 15 mL (15 mLs Swish & Spit Not Given 9/20/22 0943)   venlafaxine (EFFEXOR XR) 24 hr capsule 225 mg (225 mg Oral Given 9/20/22 0945)   traZODone (DESYREL) tablet 50 mg (has no administration in time range)   Vitamin D3 (CHOLECALCIFEROL) tablet 1,000 Units (1,000 Units Oral Given 9/20/22 0944)   sennosides (SENOKOT) tablet 1 tablet (has no administration in time  range)   promethazine (PHENERGAN) tablet 12.5 mg (has no administration in time range)   prochlorperazine (COMPAZINE) tablet 10 mg (has no administration in time range)   polyethylene glycol (MIRALAX) Packet 17 g (17 g Oral Given 9/20/22 0944)   ondansetron (ZOFRAN) tablet 4 mg (has no administration in time range)   omeprazole (priLOSEC) CR capsule 40 mg (40 mg Oral Given 9/20/22 0947)   multivitamin, therapeutic (THERA-VIT) tablet 1 tablet (1 tablet Oral Given 9/20/22 0944)   metoclopramide (REGLAN) tablet 10 mg (has no administration in time range)   LORazepam (ATIVAN) tablet 0.5 mg (has no administration in time range)   loperamide (IMODIUM) capsule 2 mg (has no administration in time range)   ibuprofen (ADVIL/MOTRIN) tablet 400 mg (has no administration in time range)   hyoscyamine (LEVSIN/SL) sublingual tablet 125 mcg (has no administration in time range)   fluticasone (FLONASE) 50 MCG/ACT spray 2 spray (2 sprays Both Nostrils Given 9/20/22 0947)   guaiFENesin (ROBITUSSIN) 20 mg/mL solution 10 mL (has no administration in time range)   hydrOXYzine (ATARAX) tablet 50 mg (has no administration in time range)   docusate sodium (COLACE) capsule 100 mg (100 mg Oral Given 9/20/22 0944)   clobetasol (TEMOVATE) 0.05 % cream ( Topical Not Given 9/20/22 0944)   cyclobenzaprine (FLEXERIL) tablet 10 mg (has no administration in time range)   OLANZapine zydis (zyPREXA) ODT tab 10 mg (10 mg Oral Given 9/19/22 1238)   LORazepam (ATIVAN) tablet 1 mg (1 mg Oral Given 9/19/22 1613)            There were no significant events during my shift.    Patient was signed out to the oncoming provider.      Impression:    ICD-10-CM    1. Homicidal ideation  R45.850    2. Suicidal ideation  R45.851        Plan:    1. Awaiting inpatient mental health admission/transfer.      RESULTS:   Results for orders placed or performed during the hospital encounter of 09/19/22 (from the past 24 hour(s))   Asymptomatic COVID-19 Virus (Coronavirus) by  PCR Nasopharyngeal     Status: Normal    Collection Time: 09/19/22 12:02 PM    Specimen: Nasopharyngeal; Swab   Result Value Ref Range    SARS CoV2 PCR Negative Negative    Narrative    Testing was performed using the Xpert Xpress SARS-CoV-2 Assay on the   Cepheid Gene-Xpert Instrument Systems. Additional information about   this Emergency Use Authorization (EUA) assay can be found via the Lab   Guide. This test should be ordered for the detection of SARS-CoV-2 in   individuals who meet SARS-CoV-2 clinical and/or epidemiological   criteria. Test performance is unknown in asymptomatic patients. This   test is for in vitro diagnostic use under the FDA EUA for   laboratories certified under CLIA to perform high complexity testing.   This test has not been FDA cleared or approved. A negative result   does not rule out the presence of PCR inhibitors in the specimen or   target RNA in concentration below the limit of detection for the   assay. The possibility of a false negative should be considered if   the patient's recent exposure or clinical presentation suggests   COVID-19. This test was validated by the [facility]. This laboratory is certified under the Clinical Laboratory Improvement Amendments of 1988 (CLIA-88) as qualified to perform high complexity laboratory testing.     Urine Drugs of Abuse Screen     Status: Normal    Collection Time: 09/19/22 12:51 PM    Narrative    The following orders were created for panel order Urine Drugs of Abuse Screen.  Procedure                               Abnormality         Status                     ---------                               -----------         ------                     Drug abuse screen 1 urin...[[id number]]  Normal              Final result                 Please view results for these tests on the individual orders.   HCG qualitative urine     Status: Normal    Collection Time: 09/19/22 12:51 PM   Result Value Ref Range     hCG Urine Qualitative Negative Negative   Drug abuse screen 1 urine (ED)     Status: Normal    Collection Time: 09/19/22 12:51 PM   Result Value Ref Range    Amphetamines Urine Screen Negative Screen Negative    Barbiturates Urine Screen Negative Screen Negative    Benzodiazepines Urine Screen Negative Screen Negative    Cannabinoids Urine Screen Negative Screen Negative    Cocaine Urine Screen Negative Screen Negative    Opiates Urine Screen Negative Screen Negative             Destiney Rocha MD                .    Home meds reviewed and ordered/administered: Yes    Medically stable for inpatient mental health admission: Yes.    Evaluated by mental health: Yes. The recommendation is for inpatient mental health treatment. Bed search in process    Safety concerns: At the time I received sign out, there were no safety concerns.    Hold Status:  Active Orders   N/A            Exam:   Patient Vitals for the past 24 hrs:   BP Temp Temp src Pulse Resp SpO2   09/20/22 0933 (!) 148/100 98  F (36.7  C) Oral 85 16 100 %   09/19/22 1607 (!) 148/79 -- -- 84 18 100 %   09/19/22 1110 119/81 98.7  F (37.1  C) Oral 85 16 97 %           ED Course:    Medications   OLANZapine zydis (zyPREXA) ODT tab 5 mg (5 mg Oral Not Given 9/19/22 1800)   alum & mag hydroxide-simethicone (MAALOX) suspension 10 mL (has no administration in time range)   acetaminophen (TYLENOL) tablet 650 mg (650 mg Oral Given 9/20/22 0951)   benztropine (COGENTIN) tablet 1 mg (1 mg Oral Not Given 9/20/22 0156)   calcium carbonate (TUMS) chewable tablet 500 mg (has no administration in time range)   chlorhexidine (PERIDEX) 0.12 % solution 15 mL (15 mLs Swish & Spit Not Given 9/20/22 0943)   venlafaxine (EFFEXOR XR) 24 hr capsule 225 mg (225 mg Oral Given 9/20/22 0945)   traZODone (DESYREL) tablet 50 mg (has no administration in time range)   Vitamin D3 (CHOLECALCIFEROL) tablet 1,000 Units (1,000 Units Oral Given 9/20/22 0944)   sennosides (SENOKOT) tablet 1  tablet (has no administration in time range)   promethazine (PHENERGAN) tablet 12.5 mg (has no administration in time range)   prochlorperazine (COMPAZINE) tablet 10 mg (has no administration in time range)   polyethylene glycol (MIRALAX) Packet 17 g (17 g Oral Given 9/20/22 0944)   ondansetron (ZOFRAN) tablet 4 mg (has no administration in time range)   omeprazole (priLOSEC) CR capsule 40 mg (40 mg Oral Given 9/20/22 0947)   multivitamin, therapeutic (THERA-VIT) tablet 1 tablet (1 tablet Oral Given 9/20/22 0944)   metoclopramide (REGLAN) tablet 10 mg (has no administration in time range)   LORazepam (ATIVAN) tablet 0.5 mg (has no administration in time range)   loperamide (IMODIUM) capsule 2 mg (has no administration in time range)   ibuprofen (ADVIL/MOTRIN) tablet 400 mg (has no administration in time range)   hyoscyamine (LEVSIN/SL) sublingual tablet 125 mcg (has no administration in time range)   fluticasone (FLONASE) 50 MCG/ACT spray 2 spray (2 sprays Both Nostrils Given 9/20/22 0947)   guaiFENesin (ROBITUSSIN) 20 mg/mL solution 10 mL (has no administration in time range)   hydrOXYzine (ATARAX) tablet 50 mg (has no administration in time range)   docusate sodium (COLACE) capsule 100 mg (100 mg Oral Given 9/20/22 0944)   clobetasol (TEMOVATE) 0.05 % cream ( Topical Not Given 9/20/22 0944)   cyclobenzaprine (FLEXERIL) tablet 10 mg (has no administration in time range)   OLANZapine zydis (zyPREXA) ODT tab 10 mg (10 mg Oral Given 9/19/22 1238)   LORazepam (ATIVAN) tablet 1 mg (1 mg Oral Given 9/19/22 1613)            There were no significant events during my shift.    Patient was signed out to the oncoming provider.       Impression:    ICD-10-CM    1. Homicidal ideation  R45.850    2. Suicidal ideation  R45.851        Plan:    2. Awaiting inpatient mental health admission/transfer.      RESULTS:   Results for orders placed or performed during the hospital encounter of 09/19/22 (from the past 24 hour(s))    Asymptomatic COVID-19 Virus (Coronavirus) by PCR Nasopharyngeal     Status: Normal    Collection Time: 09/19/22 12:02 PM    Specimen: Nasopharyngeal; Swab   Result Value Ref Range    SARS CoV2 PCR Negative Negative    Narrative    Testing was performed using the Xpert Xpress SARS-CoV-2 Assay on the   Cepheid Gene-Xpert Instrument Systems. Additional information about   this Emergency Use Authorization (EUA) assay can be found via the Lab   Guide. This test should be ordered for the detection of SARS-CoV-2 in   individuals who meet SARS-CoV-2 clinical and/or epidemiological   criteria. Test performance is unknown in asymptomatic patients. This   test is for in vitro diagnostic use under the FDA EUA for   laboratories certified under CLIA to perform high complexity testing.   This test has not been FDA cleared or approved. A negative result   does not rule out the presence of PCR inhibitors in the specimen or   target RNA in concentration below the limit of detection for the   assay. The possibility of a false negative should be considered if   the patient's recent exposure or clinical presentation suggests   COVID-19. This test was validated by the [facility]. This laboratory is certified under the Clinical Laboratory Improvement Amendments of 1988 (CLIA-88) as qualified to perform high complexity laboratory testing.     Urine Drugs of Abuse Screen     Status: Normal    Collection Time: 09/19/22 12:51 PM    Narrative    The following orders were created for panel order Urine Drugs of Abuse Screen.  Procedure                               Abnormality         Status                     ---------                               -----------         ------                     Drug abuse screen 1 urin...[[id number]]  Normal              Final result                 Please view results for these tests on the individual orders.   HCG qualitative urine     Status: Normal    Collection Time:  09/19/22 12:51 PM   Result Value Ref Range    hCG Urine Qualitative Negative Negative   Drug abuse screen 1 urine (ED)     Status: Normal    Collection Time: 09/19/22 12:51 PM   Result Value Ref Range    Amphetamines Urine Screen Negative Screen Negative    Barbiturates Urine Screen Negative Screen Negative    Benzodiazepines Urine Screen Negative Screen Negative    Cannabinoids Urine Screen Negative Screen Negative    Cocaine Urine Screen Negative Screen Negative    Opiates Urine Screen Negative Screen Negative             MD Aj Gandhi Kruti Tripathi, MD  09/20/22 9563

## 2022-09-21 ENCOUNTER — HOSPITAL ENCOUNTER (EMERGENCY)
Facility: CLINIC | Age: 22
Discharge: HOME | End: 2022-09-22
Attending: PSYCHIATRY & NEUROLOGY | Admitting: PSYCHIATRY & NEUROLOGY
Payer: MEDICARE

## 2022-09-21 ENCOUNTER — TELEPHONE (OUTPATIENT)
Dept: BEHAVIORAL HEALTH | Facility: CLINIC | Age: 22
End: 2022-09-21

## 2022-09-21 VITALS
RESPIRATION RATE: 16 BRPM | HEART RATE: 75 BPM | DIASTOLIC BLOOD PRESSURE: 84 MMHG | SYSTOLIC BLOOD PRESSURE: 127 MMHG | TEMPERATURE: 98.8 F | OXYGEN SATURATION: 99 %

## 2022-09-21 DIAGNOSIS — F79 INTELLECTUAL DISABILITY: ICD-10-CM

## 2022-09-21 DIAGNOSIS — R45.4 BEHAVIOR-IRRITABILITY: ICD-10-CM

## 2022-09-21 LAB
AMPHETAMINES UR QL SCN: NORMAL
BARBITURATES UR QL: NORMAL
BENZODIAZ UR QL: NORMAL
CANNABINOIDS UR QL SCN: NORMAL
COCAINE UR QL: NORMAL
OPIATES UR QL SCN: NORMAL

## 2022-09-21 PROCEDURE — 99285 EMERGENCY DEPT VISIT HI MDM: CPT | Mod: 25 | Performed by: PSYCHIATRY & NEUROLOGY

## 2022-09-21 PROCEDURE — 250N000013 HC RX MED GY IP 250 OP 250 PS 637: Performed by: EMERGENCY MEDICINE

## 2022-09-21 PROCEDURE — 80307 DRUG TEST PRSMV CHEM ANLYZR: CPT | Performed by: PSYCHIATRY & NEUROLOGY

## 2022-09-21 PROCEDURE — 99284 EMERGENCY DEPT VISIT MOD MDM: CPT | Performed by: PSYCHIATRY & NEUROLOGY

## 2022-09-21 PROCEDURE — 250N000013 HC RX MED GY IP 250 OP 250 PS 637: Performed by: PSYCHIATRY & NEUROLOGY

## 2022-09-21 PROCEDURE — 90791 PSYCH DIAGNOSTIC EVALUATION: CPT

## 2022-09-21 RX ORDER — OLANZAPINE 10 MG/1
10 TABLET, ORALLY DISINTEGRATING ORAL ONCE
Status: COMPLETED | OUTPATIENT
Start: 2022-09-21 | End: 2022-09-21

## 2022-09-21 RX ORDER — TRAZODONE HYDROCHLORIDE 50 MG/1
50 TABLET, FILM COATED ORAL AT BEDTIME
Status: DISCONTINUED | OUTPATIENT
Start: 2022-09-21 | End: 2022-09-22 | Stop reason: HOSPADM

## 2022-09-21 RX ADMIN — FLUTICASONE PROPIONATE 2 SPRAY: 50 SPRAY, METERED NASAL at 07:47

## 2022-09-21 RX ADMIN — CHLORHEXIDINE GLUCONATE 15 ML: 1.2 SOLUTION ORAL at 07:48

## 2022-09-21 RX ADMIN — Medication 1000 UNITS: at 07:44

## 2022-09-21 RX ADMIN — TRAZODONE HYDROCHLORIDE 50 MG: 50 TABLET ORAL at 21:44

## 2022-09-21 RX ADMIN — OLANZAPINE 5 MG: 5 TABLET, ORALLY DISINTEGRATING ORAL at 10:03

## 2022-09-21 RX ADMIN — CLOBETASOL PROPIONATE: 0.5 CREAM TOPICAL at 07:49

## 2022-09-21 RX ADMIN — OMEPRAZOLE 40 MG: 20 CAPSULE, DELAYED RELEASE ORAL at 07:41

## 2022-09-21 RX ADMIN — OLANZAPINE 10 MG: 10 TABLET, ORALLY DISINTEGRATING ORAL at 18:49

## 2022-09-21 RX ADMIN — DOCUSATE SODIUM 100 MG: 100 CAPSULE, LIQUID FILLED ORAL at 07:42

## 2022-09-21 RX ADMIN — VENLAFAXINE HYDROCHLORIDE 225 MG: 75 CAPSULE, EXTENDED RELEASE ORAL at 07:45

## 2022-09-21 RX ADMIN — POLYETHYLENE GLYCOL 3350 17 G: 17 POWDER, FOR SOLUTION ORAL at 07:43

## 2022-09-21 RX ADMIN — THERA TABS 1 TABLET: TAB at 07:43

## 2022-09-21 ASSESSMENT — ENCOUNTER SYMPTOMS
MUSCULOSKELETAL NEGATIVE: 1
RESPIRATORY NEGATIVE: 1
NEUROLOGICAL NEGATIVE: 1
CARDIOVASCULAR NEGATIVE: 1
HALLUCINATIONS: 1
CONSTITUTIONAL NEGATIVE: 1
GASTROINTESTINAL NEGATIVE: 1
EYES NEGATIVE: 1
HYPERACTIVE: 0

## 2022-09-21 ASSESSMENT — ACTIVITIES OF DAILY LIVING (ADL)
ADLS_ACUITY_SCORE: 37

## 2022-09-21 NOTE — ED NOTES
Pt slept through majority of shift, took all evening medications. 1:1 continues for SIB that is completed by biting herself. No other behavioral concerns this shift. Awaiting appropriate placement.

## 2022-09-21 NOTE — ED TRIAGE NOTES
Triage Assessment     Row Name 09/21/22 2814       Triage Assessment (Adult)    Airway WDL WDL       Respiratory WDL    Respiratory WDL WDL       Skin Circulation/Temperature WDL    Skin Circulation/Temperature WDL WDL       Cardiac WDL    Cardiac WDL WDL       Peripheral/Neurovascular WDL    Peripheral Neurovascular WDL WDL       Cognitive/Neuro/Behavioral WDL    Cognitive/Neuro/Behavioral WDL WDL

## 2022-09-21 NOTE — ED NOTES
"Pt said she is having visual and auditory hallucinations. She said she is seeing dad and dad telling her to \"kill yourself and others\". Pt is on 1:1.   "

## 2022-09-21 NOTE — ED NOTES
Pt observed briefly biting tip of her thumb. Pt redirectable, then briefly scratched thumb. Then redirectable again. After redirection, pt appeared calm.

## 2022-09-21 NOTE — DISCHARGE INSTRUCTIONS
"Discharge back to group home and continue with current outpatient program.    Aftercare Plan  If I am feeling unsafe or I am in a crisis, I will:   Contact my established care providers   Call the National Suicide Prevention Lifeline: 988  Go to the nearest emergency room   Call 911      Things I am able to do on my own or with others to cope or help me feel better: go for a walk, practice deep breathing, practice mindfulness and coping skills, listen to music, watch [place].      Changes I can make to support my mental health and wellness: adhere to treatment recommendations, practice coping skills, talk to my group home staff     People in my life that I can ask for help: group home staff, therapist and family     Your [facility] has a mental health crisis team you can call 24/7: [facility] Mobile Crisis  [phone]      Crisis Lines  Crisis Text Line  Text 495017  You will be connected with a trained live crisis counselor to provide support.     Por espanol, texto  SIRENA a 934708 o texto a 442-AYUDAME en WhatsApp     [employer] (LGBTQ Youth Crisis Line)  8.054.914.7217  text START to 280-986        Community Resources  Fast Tracker  Linking people to mental health and substance use disorder resources  fastAriesockFulhamn.org      [place] Mental [facility] Warm Line  Peer to peer support  Monday thru Saturday, 12 pm to 10 pm  761.224.2101 or 1.719.277.9007  Text \"Support\" to 77618     National [place] on Mental Illness (MAGY)  692.194.7575 or 1.888.MAGY.HELPS        Mental Health Apps  My3  https://my3app.org/     VirtualHopeBox  https://[employer].org/apps/virtual-hope-box/        Additional Information  Today you were seen by a licensed mental health professional through Triage and Transition services, [facility] ([facility])  for a crisis assessment in the Emergency Department at [facility].  It is recommended that you follow up with your established providers " (psychiatrist, mental health therapist, and/or primary care doctor - as relevant) as soon as possible. Coordinators from [facility] will be calling you in the next 24-48 hours to ensure that you have the resources you need.  You can also contact [facility] coordinators directly at [phone]. You may have been scheduled for or offered an appointment with a mental health provider. [facility] maintains an extensive network of licensed behavioral health providers to connect patients with the services they need.  We do not charge providers a fee to participate in our referral network.  We match patients with providers based on a patient's specific needs, insurance coverage, and location.  Our first effort will be to refer you to a provider within your care system, and will utilize providers outside your care system as needed.

## 2022-09-21 NOTE — ED PROVIDER NOTES
[facility] ED Mental Health Handoff Note:       Brief HPI:  Patient was signed out to me by previous physician see initial ED Provider note for full details of the presentation.   Home meds reviewed and ordered/administered: Yes    Medically stable for inpatient mental health admission: Yes.    Evaluated by mental health: Yes. The recommendation is for inpatient mental health treatment. Bed search in process    Safety concerns: At the time I received sign out, there were no safety concerns.    Emergency department course:  Patient was signed out to me by previous physician.  Currently awaiting transfer or admission for mental health.  Patient was sleeping comfortably overnight during my shift.  There were no issues during my shift.  Patient care will be signed out to the oncoming physician.       Richie Sinha,   09/21/22 1542

## 2022-09-21 NOTE — TELEPHONE ENCOUNTER
R: Patient currently at [facility] awaiting [facility] placement. Pt is currently voluntary seeking placement in [facility] only.     Inpatient Bed Log  Adults:  [facility] is posting 0 beds.   [facility] is posting 0 beds.  [facility] is posting 0 beds.  [facility] is posting 0 beds.  [facility] is posting 0 beds.  [facility] is posting 0 beds.  [facility] is posting 0 beds.  [facility] is posting 0 beds.    [facility] at capacity. The pt remains on the waitlist. Intake continues to identify appropriate bed placement.    1125 Intake received call from Zoë ([facility]) stating patient will be discharging back to group home with outpatient services. Intake removed patient from worklist, intake is no longer actively seeking [facility] placement.

## 2022-09-21 NOTE — ED PROVIDER NOTES
[facility] ED Mental Health Handoff Note:       Brief HPI:  This is a 21 year old female signed out to me by Dr. Sinha.  See initial ED Provider note for full details of the presentation. Interval history is pertinent for no new events.    Home meds reviewed and ordered/administered: Yes    Medically stable for inpatient mental health admission: Yes.    Evaluated by mental health: Yes. The recommendation is for inpatient mental health treatment. Bed search in process    Safety concerns: At the time I received sign out, there were no safety concerns.    Hold Status:  Active Orders   N/A            Exam:   Patient Vitals for the past 24 hrs:   BP Temp Temp src Pulse Resp SpO2   09/21/22 0728 139/74 98.8  F (37.1  C) Oral 78 16 98 %   09/20/22 2133 102/65 98.6  F (37  C) Oral 64 20 98 %   09/20/22 1900 (!) 156/86 -- -- 87 -- --   09/20/22 0933 (!) 148/100 98  F (36.7  C) Oral 85 16 100 %           ED Course:    Medications   OLANZapine zydis (zyPREXA) ODT tab 5 mg (5 mg Oral Not Given 9/19/22 1800)   alum & mag hydroxide-simethicone (MAALOX) suspension 10 mL (has no administration in time range)   acetaminophen (TYLENOL) tablet 650 mg (650 mg Oral Given 9/20/22 0951)   benztropine (COGENTIN) tablet 1 mg (1 mg Oral Given 9/20/22 2126)   calcium carbonate (TUMS) chewable tablet 500 mg (has no administration in time range)   chlorhexidine (PERIDEX) 0.12 % solution 15 mL (15 mLs Swish & Spit Given 9/21/22 0748)   venlafaxine (EFFEXOR XR) 24 hr capsule 225 mg (225 mg Oral Given 9/21/22 0745)   traZODone (DESYREL) tablet 50 mg (50 mg Oral Given 9/20/22 2126)   Vitamin D3 (CHOLECALCIFEROL) tablet 1,000 Units (1,000 Units Oral Given 9/21/22 0744)   sennosides (SENOKOT) tablet 1 tablet (has no administration in time range)   promethazine (PHENERGAN) tablet 12.5 mg (has no administration in time range)   prochlorperazine (COMPAZINE) tablet 10 mg (has no administration in time range)   polyethylene glycol (MIRALAX)  Packet 17 g (17 g Oral Given 9/21/22 0743)   ondansetron (ZOFRAN) tablet 4 mg (has no administration in time range)   omeprazole (priLOSEC) CR capsule 40 mg (40 mg Oral Given 9/21/22 0741)   multivitamin, therapeutic (THERA-VIT) tablet 1 tablet (1 tablet Oral Given 9/21/22 0743)   metoclopramide (REGLAN) tablet 10 mg (has no administration in time range)   LORazepam (ATIVAN) tablet 0.5 mg (has no administration in time range)   loperamide (IMODIUM) capsule 2 mg (has no administration in time range)   ibuprofen (ADVIL/MOTRIN) tablet 400 mg (has no administration in time range)   hyoscyamine (LEVSIN/SL) sublingual tablet 125 mcg (has no administration in time range)   fluticasone (FLONASE) 50 MCG/ACT spray 2 spray (2 sprays Both Nostrils Given 9/21/22 0747)   guaiFENesin (ROBITUSSIN) 20 mg/mL solution 10 mL (has no administration in time range)   hydrOXYzine (ATARAX) tablet 50 mg (50 mg Oral Not Given 9/21/22 0533)   docusate sodium (COLACE) capsule 100 mg (100 mg Oral Given 9/21/22 0742)   clobetasol (TEMOVATE) 0.05 % cream ( Topical Given 9/21/22 0749)   cyclobenzaprine (FLEXERIL) tablet 10 mg (has no administration in time range)   OLANZapine zydis (zyPREXA) ODT tab 10 mg (10 mg Oral Given 9/19/22 1238)   LORazepam (ATIVAN) tablet 1 mg (1 mg Oral Given 9/19/22 1613)            There were no significant events during my shift.    Patient was signed out to the oncoming provider      Impression:    ICD-10-CM    1. Homicidal ideation  R45.850    2. Suicidal ideation  R45.851        Plan:    1. Awaiting inpatient mental health admission/transfer.      RESULTS:   No results found for this visit on 09/19/22 (from the past 24 hour(s)).          MD Chrissy Albert David, MD  09/21/22 0675

## 2022-09-21 NOTE — ED NOTES
"Triage & Transition Services, Extended Care     Therapy Progress Note    Patient: Sadaf goes by \"Sadaf,\" uses she/her pronouns  Date of Service: September 21, 2022  Site of Service: [facility]  Patient was seen in-person.     Presenting problem:   Sadaf is followed related to long wait to admission. Please see initial DEC/[facility] Crisis Assessment completed by MARIANNE Parra on 9/19/22 for complete assessment information.      Individuals Present: Sadaf & MARIANNE Garrett    Session start: 1015  Session end: 1036  Session duration in minutes: 21  Session number: 1  Anticipated number of sessions or this episode of care: 1  CPT utilized: 72502 - Psychotherapy (with patient) - 30 (16-37*) min        Current Presentation:   Writer checked in with pt, who got up from her bed right away when writer walked in. Pt appeared eager to engage. Pt reported still having SI and said it is because she lost her dad. Pt reported she can feel him around her and it is a comforting feeling. Pt shared her family history with writer. Pt shared she bites herself when she feels frustrated. Pt shared she has stomach issues and was supposed to have surgery this morning that was going to last 4 hours but couldn't cause she is here. Pt reported feeling nervous about this surgery. Pt was bright and laughed with writer about many things. Pt was able to engage in a playful banter with writer. Pt reported she could go back to the  because they \"love me there and said I can come back\". Pt reported feeling like she could discharge today but \"may come back\".      Mental Status Exam:   Appearance: awake, alert  Attitude: cooperative  Eye Contact: good  Mood: sad  and good  Affect: appropriate and in normal range  Speech: clear, coherent  Psychomotor Behavior: no evidence of tardive dyskinesia, dystonia, or tics  Thought Process:  logical  Associations: no loose associations  Thought Content: passive suicidal ideation present  Insight: " fair  Judgement: fair  Oriented to: time, person, and place  Attention Span and Concentration: intact  Recent and Remote Memory: intact    Diagnosis:   296.44 Bipolar I Disorder Current or Most Recent Episode Manic, with psycholtic features      Attention-Deficit/Hyperactivity Disorder  314.01 (F90.9) Unspecified Attention -Deficit / Hyperactivity Disorder - by history    Therapeutic Intervention(s):   Provided active listening, unconditional positive regard, and validation.    General Recommendations:   Continue to monitor for harm. Consider: Use a positive, direct and calm approach. Pt's tend to match the energy/mood of the staff. Keep focus positive and upbeat    Plan:   Pt will discharge back to group home today per ED provider and continue with outpt services.   Plan for Care reviewed with Assigned Medical Provider? Yes. Provider, Emmie, response: Agrees     MARIANNE Garrett   Licensed Mental Health Professional (LMHP), [facility]  [phone]

## 2022-09-21 NOTE — TELEPHONE ENCOUNTER
R:      Bed search update (metro) @ 02:54:        [facility]: @ cap per website      [facility]: @ cap per website      [facility]: @ cap per website      [facility]: @ cap per website      [facility]: @ cap per website      [facility]: @ cap per website    [facility]: @ cap per website     Pt remains on work list until appropriate placement is available

## 2022-09-21 NOTE — ED TRIAGE NOTES
Pt here for AH/VH and stating she cannot remain safe, I am going to hurt myself again. Pt admits she hurt herself a couple days ago biting herself. Pt also states she feels dizzy.  The voices are telling her to kill herself and advising she jump off a bridge. Pt states she is seeing her dad.     Pt states SI with plan to bite herself.

## 2022-09-21 NOTE — ED NOTES
Pt stating she wake up confused wanting to speak with her nurse. Writer went in pt wanted to speak with EZEQUIEL Ayon, informed pt that she was busy if writer could help her with anything, declined. 1:1 informed writer pt crying and looking at her father's picture on the phone. Hydroxyzine offered but pt stated that she is not going to take any medications.

## 2022-09-22 ENCOUNTER — HOSPITAL ENCOUNTER (EMERGENCY)
Facility: CLINIC | Age: 22
Discharge: HOME | End: 2022-09-22
Attending: EMERGENCY MEDICINE | Admitting: EMERGENCY MEDICINE
Payer: MEDICARE

## 2022-09-22 VITALS
RESPIRATION RATE: 16 BRPM | TEMPERATURE: 97.7 F | HEART RATE: 104 BPM | SYSTOLIC BLOOD PRESSURE: 128 MMHG | OXYGEN SATURATION: 98 % | DIASTOLIC BLOOD PRESSURE: 80 MMHG

## 2022-09-22 VITALS
OXYGEN SATURATION: 100 % | RESPIRATION RATE: 18 BRPM | DIASTOLIC BLOOD PRESSURE: 90 MMHG | HEART RATE: 77 BPM | TEMPERATURE: 98.1 F | SYSTOLIC BLOOD PRESSURE: 148 MMHG

## 2022-09-22 DIAGNOSIS — F60.3 BORDERLINE PERSONALITY DISORDER (H): ICD-10-CM

## 2022-09-22 LAB
ATRIAL RATE - MUSE: 86 BPM
DIASTOLIC BLOOD PRESSURE - MUSE: NORMAL MMHG
INTERPRETATION ECG - MUSE: NORMAL
P AXIS - MUSE: 40 DEGREES
PR INTERVAL - MUSE: 174 MS
QRS DURATION - MUSE: 86 MS
QT - MUSE: 354 MS
QTC - MUSE: 423 MS
R AXIS - MUSE: 15 DEGREES
SYSTOLIC BLOOD PRESSURE - MUSE: NORMAL MMHG
T AXIS - MUSE: 8 DEGREES
VENTRICULAR RATE- MUSE: 86 BPM

## 2022-09-22 PROCEDURE — 99283 EMERGENCY DEPT VISIT LOW MDM: CPT | Mod: 25 | Performed by: EMERGENCY MEDICINE

## 2022-09-22 PROCEDURE — 93005 ELECTROCARDIOGRAM TRACING: CPT | Performed by: EMERGENCY MEDICINE

## 2022-09-22 PROCEDURE — 250N000013 HC RX MED GY IP 250 OP 250 PS 637: Performed by: EMERGENCY MEDICINE

## 2022-09-22 PROCEDURE — 90791 PSYCH DIAGNOSTIC EVALUATION: CPT

## 2022-09-22 PROCEDURE — 99285 EMERGENCY DEPT VISIT HI MDM: CPT | Mod: 25 | Performed by: EMERGENCY MEDICINE

## 2022-09-22 PROCEDURE — 93010 ELECTROCARDIOGRAM REPORT: CPT | Performed by: EMERGENCY MEDICINE

## 2022-09-22 PROCEDURE — 250N000011 HC RX IP 250 OP 636: Performed by: EMERGENCY MEDICINE

## 2022-09-22 PROCEDURE — 96372 THER/PROPH/DIAG INJ SC/IM: CPT | Performed by: EMERGENCY MEDICINE

## 2022-09-22 RX ORDER — OLANZAPINE 10 MG/1
10 TABLET, ORALLY DISINTEGRATING ORAL ONCE
Status: DISCONTINUED | OUTPATIENT
Start: 2022-09-22 | End: 2022-09-22

## 2022-09-22 RX ORDER — ZIPRASIDONE MESYLATE 20 MG/ML
20 INJECTION, POWDER, LYOPHILIZED, FOR SOLUTION INTRAMUSCULAR ONCE
Status: COMPLETED | OUTPATIENT
Start: 2022-09-22 | End: 2022-09-22

## 2022-09-22 RX ORDER — VENLAFAXINE HYDROCHLORIDE 225 MG/1
225 TABLET, EXTENDED RELEASE ORAL DAILY
Status: DISCONTINUED | OUTPATIENT
Start: 2022-09-22 | End: 2022-09-22 | Stop reason: HOSPADM

## 2022-09-22 RX ORDER — LORAZEPAM 1 MG/1
1 TABLET ORAL ONCE
Status: COMPLETED | OUTPATIENT
Start: 2022-09-22 | End: 2022-09-22

## 2022-09-22 RX ORDER — OLANZAPINE 10 MG/1
10 TABLET, ORALLY DISINTEGRATING ORAL
Status: DISCONTINUED | OUTPATIENT
Start: 2022-09-22 | End: 2022-09-23 | Stop reason: HOSPADM

## 2022-09-22 RX ORDER — PROMETHAZINE HYDROCHLORIDE 12.5 MG/1
12.5 TABLET ORAL EVERY 4 HOURS PRN
Status: DISCONTINUED | OUTPATIENT
Start: 2022-09-22 | End: 2022-09-22 | Stop reason: HOSPADM

## 2022-09-22 RX ORDER — IBUPROFEN 600 MG/1
600 TABLET, FILM COATED ORAL ONCE
Status: COMPLETED | OUTPATIENT
Start: 2022-09-22 | End: 2022-09-22

## 2022-09-22 RX ADMIN — LORAZEPAM 1 MG: 1 TABLET ORAL at 11:19

## 2022-09-22 RX ADMIN — VENLAFAXINE 225 MG: 225 TABLET, EXTENDED RELEASE ORAL at 11:45

## 2022-09-22 RX ADMIN — ZIPRASIDONE MESYLATE 20 MG: 20 INJECTION, POWDER, LYOPHILIZED, FOR SOLUTION INTRAMUSCULAR at 12:40

## 2022-09-22 RX ADMIN — IBUPROFEN 600 MG: 600 TABLET ORAL at 11:19

## 2022-09-22 ASSESSMENT — ENCOUNTER SYMPTOMS
EYE REDNESS: 0
COLOR CHANGE: 0
CHILLS: 0
SORE THROAT: 0
ARTHRALGIAS: 0
NAUSEA: 1
ABDOMINAL PAIN: 0
HEADACHES: 0
COUGH: 1
VOMITING: 0
FEVER: 0
DIARRHEA: 0
HALLUCINATIONS: 1
SHORTNESS OF BREATH: 0
DIFFICULTY URINATING: 0
CONFUSION: 0

## 2022-09-22 ASSESSMENT — ACTIVITIES OF DAILY LIVING (ADL)
ADLS_ACUITY_SCORE: 37

## 2022-09-22 NOTE — ED NOTES
"Triage & Transition Services, Extended Care     Therapy Progress Note    Patient: Sadaf goes by \"Sadaf,\" uses she/her pronouns  Date of Service: September 22, 2022  Site of Service:  [facility] ED  Patient was seen virtually ([employer] cart or other teleconferencing device).     Presenting problem:   Sadaf is followed related to Boarding Status. Please see initial DEC/[facility] Crisis Assessment completed by Morgan Robles LP on 9/21/22 for complete assessment information. Notable concerns include: suicidal risk and psychosis.     .     Individuals Present: Sadaf & MARIANNE Dela Cruz    Session start: 1100  Session end: 1117  Session duration in minutes: 17  Session number: 1  Anticipated number of sessions or this episode of care: 1  CPT utilized: 31735 - Psychotherapy (with patient) - 30 (16-37*) min    Current Presentation:   Met with Sadaf she reports being severely suicidal with plan to bit herself.  She reports the people at the group home love her though they won't engage with her.  She says she just wants to die and being with her dad.  Writer attempted to work on coping skills and make a plan for for stabilization at the group home, she reports nothing works.  She was laughing and easily engaged, shared some of the fun times she had with her dad fishing, biking, hiking and going out to eat.  She missed him a lot.  We talked about grief and loss and staying positive.  Sadaf continues to say she is suicidal, though doesn't have any idea how going inpatient could be beneficial to her.       Mental Status Exam:   Appearance: awake, alert, appeared as age stated and well groomed  Attitude: slightly uncooperative  Eye Contact: good  Mood: sad   Affect: mood congruent, intensity is blunted and intensity is flat  Speech: clear, coherent  Psychomotor Behavior: no evidence of tardive dyskinesia, dystonia, or tics  Thought Process:  linear  Associations: no loose associations  Thought Content: no evidence of suicidal " ideation or homicidal ideation  Insight: partial  Judgement: limited  Oriented to: time, person, and place  Attention Span and Concentration: fair  Recent and Remote Memory: fair    Diagnosis:   Borderline Personality D/O F60.3  Unspecified Intellectual Disability F79  Bipolar, unspecified F31.9 by history       Therapeutic Intervention(s):   Provided active listening, unconditional positive regard, and validation. Engaged in safety planning. Pt was not interesting in working on coping skills or a plan to keep herself safe.    Treatment Objective(s) Addressed:   The focus of this session was on rapport building, identifying and practicing coping strategies and safety planning .     Progress Towards Goals:   Patient reports stable symptoms. Patient is not making progress towards treatment goals as evidenced by unwillingness to work on coping skills and just wants to stay in the hospital because people engage with her..     Case Management:   1019 Akila, nurse called  1037 writer called back  1055 Akila called back when pt was done with other call.  [id number] Spoke with Akila and Dr. Romero    General Recommendations:   Continue to monitor for harm. Consider: Other: discharge pt back to group home and continue with outpatient services     Plan:   Pt has been in the ED 9/16,9/17, 9/19-21, 9/21 again.  Return to group home and outpatient services.  Consider grief support group for additional support.        Plan for Care reviewed with Assigned Medical Provider? Yes. Provider, Dr. Romero, response: send her back to the group with medication      MARIANNE Dela Cruz   Licensed Mental Health Professional (LMHP), [facility]  [phone]

## 2022-09-22 NOTE — ED NOTES
Pt A/O x 4, mood has been labile since writer took report from Rivka NIEVES.  Pt continues to have 1:1 sitter at [facility].  Independent with ADL's and ambulation.

## 2022-09-22 NOTE — PHARMACY-ADMISSION MEDICATION HISTORY
Please see Admission Medication History note completed on 9/19/22 under previous encounter for information regarding prior to admission medications.    Miguelangel Hurtado, PharmD, BCPS, BCPP

## 2022-09-22 NOTE — ED PROVIDER NOTES
ED Provider Note  [facility]      History     Chief Complaint   Patient presents with     Suicidal     HPI  Sadaf Ross is a 21 year old female with a PMH of borderline personality disorder, bipolar 1, intellectual disability, ADHD, depression, homicidal ideation and suicidal ideation who presents to the ED today reporting suicidal ideation.  Patient states she was just discharged from the ED today (literally a few hours ago after staying here overnight and being discharged back to her group home) and returned back to her group home, but did not feel safe so she called a cab to bring her back here to the ED.  She endorses suicidal ideation stating that she feels the same as she has been before, has had thoughts of slitting her throat.  She states she has been feeling like this for around a year now.  She reports she has been at her group home for 2 years.  She states that she has a psychiatrist and therapist, whom she recently talked to a few days ago, and states that she is taking all of her medications as prescribed. She also endorses chest pain and nausea, stating this often happens while in the hospital.  Additionally, patient endorses a cough that has been improving.  She endorses both auditory and visual hallucinations stating that she is seeing spirits such as her dead father.  She also notes that she is hearing voices.  She states that she just began hearing these voices and states that the voices tell her to kill herself and she hears her dead father's voice.  She states she has done nothing to harm herself today.  She does note that she has cut herself in the past, most recently a few years ago.  She also adds that she gets a monthly Abilify injection and last received this today at around 5:30 PM.  Patient denies any drug use, fever, sore throat, vomiting or diarrhea.    Patient was recently seen here in the ED yesterday on 9/21/2022 reporting suicidal ideation and  hallucinations. She is well-known to us due to her multiple ED visits. She was just discharged home that morning morning after staying in the ED with initial plan for admission as she was making homicidal threats, but ultimately determined to be at baseline and exhibiting secondary gain of admission in order to avoid being in her group home. She felt she was not ready to return to her group home earlier that day and returned stating that she is suicidal and hallucinating. She was not responding to internal stimuli. She was calm and in emotional control here. She gets irritated that she does not need to be admitted.     Past Medical History  Past Medical History:   Diagnosis Date     ADHD (attention deficit hyperactivity disorder)      Bipolar 1 disorder (H)      Borderline personality disorder (H)      Depression      Depressive disorder      Intellectual disability      Obesity      Syncope      Past Surgical History:   Procedure Laterality Date     APPENDECTOMY       APPENDECTOMY       acetaminophen (TYLENOL) 325 MG tablet  alum & mag hydroxide-simethicone (MAALOX  ES) 400-400-40 MG/5ML SUSP suspension  ARIPiprazole lauroxil ER (ARISTADA) 882 MG/3.2ML intra-muscular  benztropine (COGENTIN) 1 MG tablet  calcium carbonate (TUMS) 500 MG chewable tablet  chlorhexidine (PERIDEX) 0.12 % solution  clobetasol (TEMOVATE) 0.05 % CREA cream  cyclobenzaprine (FLEXERIL) 10 MG tablet  diclofenac (VOLTAREN) 1 % topical gel  docusate sodium (COLACE) 50 MG capsule  fluticasone (FLONASE) 50 MCG/ACT nasal spray  guaiFENesin (ROBITUSSIN) 100 MG/5ML SYRP  hydrocortisone (CORTAID) 0.5 % external cream  hydrOXYzine (ATARAX) 50 MG tablet  hyoscyamine (LEVSIN/SL) 0.125 MG sublingual tablet  ibuprofen (ADVIL/MOTRIN) 400 MG tablet  loperamide (IMODIUM) 2 MG capsule  LORazepam (ATIVAN) 0.5 MG tablet  metoclopramide (REGLAN) 10 MG tablet  multivitamin, therapeutic (THERA-VIT) TABS tablet  omeprazole (PRILOSEC) 40 MG DR capsule  ondansetron  (ZOFRAN) 4 MG tablet  polyethylene glycol (MIRALAX) 17 GM/Dose powder  prochlorperazine (COMPAZINE) 10 MG tablet  promethazine (PHENERGAN) 12.5 MG tablet  sennosides (SENOKOT) 8.6 MG tablet  traZODone (DESYREL) 50 MG tablet  venlafaxine (EFFEXOR-ER) 225 MG 24 hr tablet  Vitamin D, Cholecalciferol, 25 MCG (1000 UT) TABS      Allergies   Allergen Reactions     Penicillins Rash and Unknown     Family History  Family History   Problem Relation Age of Onset     Diabetes Type 1 Father      Cancer Paternal Grandfather      Social History   Social History     Tobacco Use     Smoking status: Current Every Day Smoker     Packs/day: 0.50     Years: 5.00     Pack years: 2.50     Types: Vaping Device     Smokeless tobacco: Never Used   Substance Use Topics     Alcohol use: No     Drug use: No      Past medical history, past surgical history, medications, allergies, family history, and social history were reviewed with the patient. No additional pertinent items.       Review of Systems   Constitutional: Negative for chills and fever.   HENT: Negative for congestion and sore throat.    Eyes: Negative for redness.   Respiratory: Positive for cough. Negative for shortness of breath.    Cardiovascular: Positive for chest pain.   Gastrointestinal: Positive for nausea. Negative for abdominal pain, diarrhea and vomiting.   Genitourinary: Negative for difficulty urinating.   Musculoskeletal: Negative for arthralgias.   Skin: Negative for color change.   Neurological: Negative for headaches.   Psychiatric/Behavioral: Positive for hallucinations and suicidal ideas. Negative for confusion.   All other systems reviewed and are negative.    A complete review of systems was performed with pertinent positives and negatives noted in the HPI, and all other systems negative.    Physical Exam   BP: 128/80  Pulse: 104  Temp: 97.7  F (36.5  C)  Resp: 16  SpO2: 98 %  Physical Exam  Vitals and nursing note reviewed.   Constitutional:       General: She  is not in acute distress.     Appearance: She is well-developed. She is not diaphoretic.      Comments: Adult female, sitting in chair, cooperative.   HENT:      Head: Normocephalic and atraumatic.   Eyes:      Conjunctiva/sclera: Conjunctivae normal.      Pupils: Pupils are equal, round, and reactive to light.   Cardiovascular:      Rate and Rhythm: Normal rate and regular rhythm.      Pulses: Normal pulses.      Heart sounds: Normal heart sounds. No murmur heard.  Pulmonary:      Effort: Pulmonary effort is normal. No respiratory distress.      Breath sounds: Normal breath sounds. No wheezing or rales.   Abdominal:      General: Bowel sounds are normal. There is no distension.      Palpations: Abdomen is soft.      Tenderness: There is no abdominal tenderness.   Musculoskeletal:      Cervical back: Normal range of motion and neck supple.   Skin:     General: Skin is warm and dry.   Neurological:      Mental Status: She is alert and oriented to person, place, and time.   Psychiatric:      Comments: Calm and cooperative. Reactive affect. Insight limited. SI. + AH. No HI.          ED Course     9:15 PM  The patient was seen and examined by Deepa Pelaez MD in Room HW02.     Procedures       The medical record was reviewed and interpreted.  Mental Health Risk Assessment      PSS-3    Date and Time Over the past 2 weeks have you felt down, depressed, or hopeless? Over the past 2 weeks have you had thoughts of killing yourself? Have you ever attempted to kill yourself? When did this last happen? User   09/22/22 2022 yes yes yes more than 6 months ago [facility]WS      C-SSRS ([place])    Date and Time Q1 Wished to be Dead (Past Month) Q2 Suicidal Thoughts (Past Month) Q3 Suicidal Thought Method Q4 Suicidal Intent without Specific Plan Q5 Suicide Intent with Specific Plan Q6 Suicide Behavior (Lifetime) Within the Past 3 Months? RETIRED: Level of Risk per Screen Screening Not Complete User   09/22/22 2022 yes yes yes no no  yes -- -- -- FWS                     Results for orders placed or performed during the hospital encounter of 09/21/22   Drug abuse screen 1 urine (ED)     Status: Normal   Result Value Ref Range    Amphetamines Urine Screen Negative Screen Negative    Barbiturates Urine Screen Negative Screen Negative    Benzodiazepines Urine Screen Negative Screen Negative    Cannabinoids Urine Screen Negative Screen Negative    Cocaine Urine Screen Negative Screen Negative    Opiates Urine Screen Negative Screen Negative   EKG 12-lead, tracing only     Status: None   Result Value Ref Range    Systolic Blood Pressure  mmHg    Diastolic Blood Pressure  mmHg    Ventricular Rate 86 BPM    Atrial Rate 86 BPM    NE Interval 174 ms    QRS Duration 86 ms     ms    QTc 423 ms    P Axis 40 degrees    R AXIS 15 degrees    T Axis 8 degrees    Interpretation ECG       Sinus rhythm  Normal ECG  Unconfirmed report - interpretation of this ECG is computer generated - see medical record for final interpretation  Confirmed by - EMERGENCY ROOM, PHYSICIAN ([id number]),  JENARO GALICIA ([id number]) on 9/22/2022 7:14:59 AM     Urine Drugs of Abuse Screen     Status: Normal    Narrative    The following orders were created for panel order Urine Drugs of Abuse Screen.  Procedure                               Abnormality         Status                     ---------                               -----------         ------                     Drug abuse screen 1 urin...[[id number]]  Normal              Final result                 Please view results for these tests on the individual orders.     Medications   OLANZapine zydis (zyPREXA) ODT tab 10 mg (10 mg Oral Not Given 9/22/22 1907)        Assessments & Plan (with Medical Decision Making)   Patient presents to the emergency department today after she herself called a cab from her group home to come back to the ED because she is feeling suicidal.  This is typically how she presents.  She has multiple ED  "visits for suicidal ideation, anxiety, and borderline personality disorder.    This is how she typically presents.  Fortunately, she received her long-acting Abilify injection at her group home this afternoon.  It appears that according to her group home staff, she becomes so fixated on coming to the emergency department that they are unable to prevent her from calling for a ride or calling 911.  They are apparently working on getting a guardian assigned to her, but of course this takes time.  She is currently her own guardian.    [facility]  did speak with the group home, please see her documentation.  At this point, given the fact that she will be was just assessed earlier today by the [facility] , they do not feel that she requires another assessment.  There is some thought to that Sadaf may actually benefit and obtain some secondary gain by coming to the ED to speak with a mental health . The group home reports that she has 1:1 staffing and all potentially dangerous items are locked up. Per group home staff, she continues to fixate on coming to the ED and in fact says she \"has a job at the hospital as a \".   She does have access to her psychiatrist and her therapist and is at a group home who reports they will take her back and believes they can keep her safe.  I did speak to Sadaf and she became immediately upset and irritable, telling me to \"get away from me!\" She said that she is just going to come right back to the ED (which is in and of itself a protective factor in terms of not harming herself). She was told that she needs to follow up with her psychiatrist and her therapist and use her PRNs at her group home.  She continued to yell at staff and we did order zyprexa 10 mg PO which she has declined.  We will arrange to send her back via ambulance to her group home. It was suggested to the group home that they explore restricting access to her phone since she seems to use this to " arrange her own transportation to the ED even when this is not necessary.     I have reviewed the nursing notes. I have reviewed the findings, diagnosis, plan and need for follow up with the patient.    New Prescriptions    No medications on file       Final diagnoses:   Borderline personality disorder (H)   I, Matt Stover, am serving as a trained medical scribe to document services personally performed by Deepa Pelaez MD, based on the provider's statements to me.     I, Deepa Pelaez MD, was physically present and have reviewed and verified the accuracy of this note documented by Matt Stover.      --  Deepa Pelaez MD  [facility] EMERGENCY DEPARTMENT  9/22/2022     Deepa Pelaez MD  09/22/22 3526

## 2022-09-22 NOTE — ED NOTES
"Patient upset that she is not being admitted, patient reports \"I need to be admitted, I am a threat at home.\" Dr. Romero and   notified. Medication ordered to help the patient calm down. Patient has been to the ED multiple days in a row and has a long history of avoiding discharge with escalating behaviors. Patient declined oral medication, IM Geodon administered.   "

## 2022-09-22 NOTE — ED PROVIDER NOTES
ED OBS H and P Provider Note  [facility]      History     Chief Complaint   Patient presents with     Hallucinations     Suicidal     HPI  Sadaf Ross is a 21 year old female who is here as she reports feeling suicidal and having hallucinations. She is well-known to us due to her multiple ED visits. She was just discharged home this morning after staying in the ED with initial plan for admission as she was making homicidal threats, but ultimately determined to be at baseline and exhibiting secondary gain of admission in order to avoid being in her group home. She felt she was not ready to return to her group home earlier today and returned stating that she is suicidal and hallucinating. She is not responding to internal stimuli. She is calm and in emotional control here. She gets irritated that she does not need to be admitted.     Please see DEC Crisis Assessment on 9/21/22 in Epic for further details.    PERSONAL MEDICAL HISTORY  Past Medical History:   Diagnosis Date     ADHD (attention deficit hyperactivity disorder)      Bipolar 1 disorder (H)      Borderline personality disorder (H)      Depression      Depressive disorder      Intellectual disability      Obesity      Syncope      PAST SURGICAL HISTORY  Past Surgical History:   Procedure Laterality Date     APPENDECTOMY       APPENDECTOMY       FAMILY HISTORY  Family History   Problem Relation Age of Onset     Diabetes Type 1 Father      Cancer Paternal Grandfather      SOCIAL HISTORY  Social History     Tobacco Use     Smoking status: Current Every Day Smoker     Packs/day: 0.50     Years: 5.00     Pack years: 2.50     Types: Vaping Device     Smokeless tobacco: Never Used   Substance Use Topics     Alcohol use: No     MEDICATIONS  Current Facility-Administered Medications   Medication     traZODone (DESYREL) tablet 50 mg     Current Outpatient Medications   Medication     acetaminophen (TYLENOL) 325 MG tablet     alum & mag  hydroxide-simethicone (MAALOX  ES) 400-400-40 MG/5ML SUSP suspension     ARIPiprazole lauroxil ER (ARISTADA) 882 MG/3.2ML intra-muscular     benztropine (COGENTIN) 1 MG tablet     calcium carbonate (TUMS) 500 MG chewable tablet     chlorhexidine (PERIDEX) 0.12 % solution     clobetasol (TEMOVATE) 0.05 % CREA cream     cyclobenzaprine (FLEXERIL) 10 MG tablet     diclofenac (VOLTAREN) 1 % topical gel     docusate sodium (COLACE) 50 MG capsule     fluticasone (FLONASE) 50 MCG/ACT nasal spray     guaiFENesin (ROBITUSSIN) 100 MG/5ML SYRP     hydrocortisone (CORTAID) 0.5 % external cream     hydrOXYzine (ATARAX) 50 MG tablet     hyoscyamine (LEVSIN/SL) 0.125 MG sublingual tablet     ibuprofen (ADVIL/MOTRIN) 400 MG tablet     loperamide (IMODIUM) 2 MG capsule     LORazepam (ATIVAN) 0.5 MG tablet     metoclopramide (REGLAN) 10 MG tablet     multivitamin, therapeutic (THERA-VIT) TABS tablet     omeprazole (PRILOSEC) 40 MG DR capsule     ondansetron (ZOFRAN) 4 MG tablet     polyethylene glycol (MIRALAX) 17 GM/Dose powder     prochlorperazine (COMPAZINE) 10 MG tablet     promethazine (PHENERGAN) 12.5 MG tablet     sennosides (SENOKOT) 8.6 MG tablet     traZODone (DESYREL) 50 MG tablet     venlafaxine (EFFEXOR-ER) 225 MG 24 hr tablet     Vitamin D, Cholecalciferol, 25 MCG (1000 UT) TABS     ALLERGIES  Allergies   Allergen Reactions     Penicillins Rash and Unknown        Review of Systems   Constitutional: Negative.    HENT: Negative.    Eyes: Negative.    Respiratory: Negative.    Cardiovascular: Negative.    Gastrointestinal: Negative.    Genitourinary: Negative.    Musculoskeletal: Negative.    Neurological: Negative.    Psychiatric/Behavioral: Positive for behavioral problems, hallucinations and suicidal ideas. The patient is not hyperactive.    All other systems reviewed and are negative.        Physical Exam   BP: (!) 154/104  Pulse: 113  Temp: 98.4  F (36.9  C)  Resp: 18  SpO2: 98 %  Physical Exam  Vitals and nursing  note reviewed.   HENT:      Head: Normocephalic.   Eyes:      Pupils: Pupils are equal, round, and reactive to light.   Pulmonary:      Effort: Pulmonary effort is normal.   Musculoskeletal:         General: Normal range of motion.      Cervical back: Normal range of motion.   Neurological:      General: No focal deficit present.      Mental Status: She is alert.   Psychiatric:         Attention and Perception: Attention and perception normal. She does not perceive auditory or visual hallucinations.         Mood and Affect: Mood normal. Affect is blunt.         Speech: Speech normal.         Behavior: Behavior normal. Behavior is not agitated, aggressive, hyperactive or combative. Behavior is cooperative.         Thought Content: Thought content normal. Thought content is not paranoid or delusional. Thought content does not include homicidal ideation.         Cognition and Memory: Cognition and memory normal.         Judgment: Judgment is impulsive and inappropriate.         ED Course      Procedures         Mental Health Risk Assessment      PSS-3    Date and Time Over the past 2 weeks have you felt down, depressed, or hopeless? Over the past 2 weeks have you had thoughts of killing yourself? Have you ever attempted to kill yourself? When did this last happen? User   09/21/22 1715 yes yes yes within the last 24 hours (including today) MM      C-SSRS ([place])    Date and Time Q1 Wished to be Dead (Past Month) Q2 Suicidal Thoughts (Past Month) Q3 Suicidal Thought Method Q4 Suicidal Intent without Specific Plan Q5 Suicide Intent with Specific Plan Q6 Suicide Behavior (Lifetime) Within the Past 3 Months? RETIRED: Level of Risk per Screen Screening Not Complete User   09/21/22 1715 yes yes yes yes yes yes -- -- -- MM              Suicide assessment completed by mental health (D.E.C., LCSW, etc.)       Results for orders placed or performed during the hospital encounter of 09/21/22   Drug abuse screen 1 urine (ED)      Status: Normal   Result Value Ref Range    Amphetamines Urine Screen Negative Screen Negative    Barbiturates Urine Screen Negative Screen Negative    Benzodiazepines Urine Screen Negative Screen Negative    Cannabinoids Urine Screen Negative Screen Negative    Cocaine Urine Screen Negative Screen Negative    Opiates Urine Screen Negative Screen Negative   Urine Drugs of Abuse Screen     Status: Normal    Narrative    The following orders were created for panel order Urine Drugs of Abuse Screen.  Procedure                               Abnormality         Status                     ---------                               -----------         ------                     Drug abuse screen 1 urin...[[id number]]  Normal              Final result                 Please view results for these tests on the individual orders.   Results for orders placed or performed during the hospital encounter of 09/19/22   HCG qualitative urine     Status: Normal   Result Value Ref Range    hCG Urine Qualitative Negative Negative   Asymptomatic COVID-19 Virus (Coronavirus) by PCR Nasopharyngeal     Status: Normal    Specimen: Nasopharyngeal; Swab   Result Value Ref Range    SARS CoV2 PCR Negative Negative    Narrative    Testing was performed using the Xpert Xpress SARS-CoV-2 Assay on the   Cepheid Gene-Xpert Instrument Systems. Additional information about   this Emergency Use Authorization (EUA) assay can be found via the Lab   Guide. This test should be ordered for the detection of SARS-CoV-2 in   individuals who meet SARS-CoV-2 clinical and/or epidemiological   criteria. Test performance is unknown in asymptomatic patients. This   test is for in vitro diagnostic use under the FDA EUA for   laboratories certified under CLIA to perform high complexity testing.   This test has not been FDA cleared or approved. A negative result   does not rule out the presence of PCR inhibitors in the specimen or   target RNA in concentration below the  limit of detection for the   assay. The possibility of a false negative should be considered if   the patient's recent exposure or clinical presentation suggests   COVID-19. This test was validated by the [facility]. This laboratory is certified under the Clinical Laboratory Improvement Amendments of 1988 (CLIA-88) as qualified to perform high complexity laboratory testing.     Drug abuse screen 1 urine (ED)     Status: Normal   Result Value Ref Range    Amphetamines Urine Screen Negative Screen Negative    Barbiturates Urine Screen Negative Screen Negative    Benzodiazepines Urine Screen Negative Screen Negative    Cannabinoids Urine Screen Negative Screen Negative    Cocaine Urine Screen Negative Screen Negative    Opiates Urine Screen Negative Screen Negative   Urine Drugs of Abuse Screen     Status: Normal    Narrative    The following orders were created for panel order Urine Drugs of Abuse Screen.  Procedure                               Abnormality         Status                     ---------                               -----------         ------                     Drug abuse screen 1 urin...[[id number]]  Normal              Final result                 Please view results for these tests on the individual orders.     Medications   traZODone (DESYREL) tablet 50 mg (has no administration in time range)   OLANZapine zydis (zyPREXA) ODT tab 10 mg (10 mg Oral Given 9/21/22 1849)        Assessments & Plan (with Medical Decision Making)   Patient is here for a mental health and safety assessment. She has history of bipolar disorder, borderline intellectual functioning who is placed in a group home but dislikes being there and tries to get admitted as secondary gain of getting her needs met here and avoid her group home. This is a chronc behavioral maladaptive reaction. Patient appears at baseline and is not acutely psychotic or altered. She does not need admission. The   plans on working on getting her returned to her group home tomorrow. She is not ready to return tonight and will quickly return if she is sent home tonight. She is made ED OBS. She is due for her Aristada injection tomorrow and she should receive this on return to her group home (preferably), or get it here but the solution should be brought in.    I have reviewed the nursing notes. I have reviewed the findings, diagnosis, plan and need for follow up with the patient.    New Prescriptions    No medications on file       Final diagnoses:   Intellectual disability   Behavior-irritability       --  Magno Sena MD  [facility] EMERGENCY DEPARTMENT  9/21/2022     Magno Sena MD  09/21/22 2040       Magno Sena MD  09/21/22 2043     Additional Notes: Recommend cerave daily moisturizer Detail Level: Detailed

## 2022-09-22 NOTE — ED NOTES
Patient well-known to people here and is being discharged back to her group home this morning by extended care.  Patient was given Geodon 20 mg IM for her anxiety and agitation upon knowing she was being transferred back to her group home.      BP (!) 148/90   Pulse 77   Temp 98.1  F (36.7  C) (Oral)   Resp 18   LMP 08/25/2022   SpO2 100%    HEENT: Atraumatic  Lungs: Without respiratory distress  Extremities: Without deformity  Neuro: Grossly intact and symmetric        Final diagnoses:   Intellectual disability   Behavior-irritability     Patient sent back to group home    Ayo Rmoero MD, Ayo Schneider MD  09/22/22 1310

## 2022-09-22 NOTE — DISCHARGE INSTRUCTIONS
Discharge back to group home and continue with current outpatient program.  Consider some kind of Grief support group.    [facility]   [facility] offers therapy groups designed to provide a safe and supportive setting for people to share their loss journey with others. These groups are facilitated by professional grief therapists and provide an opportunity for participants to explore the impact that the loss has had and continues to have on their lives.   Groups available include such topics as these:  Women's Loss Group   Where: [facility], [address] [place] [address]  Contact: Call [phone]. Email [email or web address].    Aftercare Plan  If I am feeling unsafe or I am in a crisis, I will:  Contact my established care providers  Call the National Suicide Prevention Lifeline: 988  Go to the nearest emergency room  Call 911  Things I am able to do on my own or with others to cope or help me feel better: go for a walk, practice deep  breathing, practice mindfulness and coping skills, listen to music, watch [place].  Changes I can make to support my mental health and wellness: adhere to treatment recommendations, practice  coping skills, talk to my group home staff  People in my life that I can ask for help: group home staff, therapist and family  Your [facility] has a mental health crisis team you can call 24/7: [facility] Mobile Crisis  [phone]  Crisis Lines  Crisis Text Line  Text 938875  You will be connected with a trained live crisis counselor to provide support.  Por espanol, texto SIRENA a 177168 o texto a 442-AYUDAME en WhatsApp  The [employer] (LGBTQ Youth Crisis Line)  4.890.633.4150  text START to 446-407  Community Resources  Fast Tracker  Linking people to mental health and substance use disorder resources  fasttrackermn.org   [place] Mental [facility] Warm Line  Peer to peer support  Monday thru  "Saturday, 12 pm to 10 pm  813.217.9000 or  6.199.422.4734  Text \"Support\" to 38449  National [employer] Mental Illness (MAGY)  512.751.0605 or 1.888.MAGY.HELPS  Mental Health Apps  My3  https://myCaptureProofpp.org/  VirtualHopeBox  https://Captain Wise/apps/virtual-hope-box/  "

## 2022-09-22 NOTE — CONSULTS
Diagnostic Evaluation Consultation  Crisis Assessment    Patient was assessed: In Person  Patient location: [facility] ED  Was a release of information signed: Yes. Providers included on the release: Kavon Wilkins Chen by pt Sadaf Ross      Referral Data and Chief Complaint  Pt is a 21 year old female who uses she/her pronouns, and presents to the ED via EMS. Patient is referred to the ED by self. Patient is presenting to the ED for the following concerns: suicidal risk and psychosis.      Informed Consent and Assessment Methods     Patient is her own guardian. Writer met with patient and explained the crisis assessment process, including applicable information disclosures and limits to confidentiality, assessed understanding of the process, and obtained consent to proceed with the assessment. Patient was observed to be able to participate in the assessment as evidenced by engaged. Assessment methods included conducting a formal interview with patient, review of medical records, collaboration with medical staff, and obtaining relevant collateral information from family and community providers when available..     Over the course of this crisis assessment provided reassurance, offered validation, engaged patient in problem solving and disposition planning, worked with patient on safety and aftercare planning, provided psychoeducation and facilitated family communication.     Summary of Patient Situation     Sadaf Ross was assessed at the request of Dr. Sena for the following reasons: Pt seen September 18 with recommendation for discharge back to , however the following say there is a notation from Central Intake that DEC  Monica was recommending admission.  Pt remained in the ED until this morning when she was discharged back to  which pt and  staff supported.  Pt now returns again reporting hallucinations telling her to kill herself and some minor biting (does not break skin) on her thumb.  Pt  "was first seen on September 18, 2022 by Chloe Brock; see the initial assessment note for details.    Pt reports she did not want to return to the group home this morning because \"I wasn't stable\" reports she hears voices all the time, medications don't help, has worked with her therapist on coping but only finds music to be helpful (classic country).  She does not indicate anything different that occurred at the group home to cause an increase in symptoms, other than she wasn't stable.  She concurs with discharge note that staff like her, says \"they want me to be back there and I want to but not until I'm stable\".  She says once back the voices told her to jump off a bridge.  She reports she bites herself and bangs her head in self injury, in the past has \"almost\" bitten hard enough to break the skin.  She denies suicidal or violent intent, reports voices remain present, however no marked interference noted during the interview.  She denies drug or alcohol use but voices a \"wish\" that she could, indicates she has used marijuana in the past and \"almost\" meth.      Spoke with Arlene at the group home ([phone]) who says pt returned in the morning and said she was \"feeling much better\", ate lunch then took a nap.  She then got up and went for a walk with staff observing.  She returned from her walk and said the voices were telling her to kill herself, refused PRN (Hydroxyzine) and called 911 herself asking to come back to the hospital.  Arlene saw nothing different from her usual demeanor, says pt consistently reports the presences of voices.  Arlene says pt can return, she is scheduled to get her monthly IM shot of Abilify (Aristada) tomorrow.    Brief Psychosocial History     The patient lives in group home in [place] and has been there about two years.  Prior to that, she was at [facility] for 3 months.  The patient is a high school graduate from [place], and she had an IEP.  No known family mental " health issues.    Significant Clinical History     Pt has multiple past inpatient admissions (last November 21, 2021 at [facility]), multiple ED visits with c/o abdominal pain, suicidal pain or hallucinations, has resided in current group home for 2 years.     Collateral Information    Arlene [facility] staff ([phone])     Risk Assessment  ESS-6  1.a. Over the past 2 weeks, have you had thoughts of killing yourself? Yes  1.b. Have you ever attempted to kill yourself and, if yes, when did this last happen? No   2. Recent or current suicide plan? Yes Jump from bridge - thoughts   3. Recent or current intent to act on ideation? No  4. Lifetime psychiatric hospitalization? Yes  5. Pattern of excessive substance use? No  6. Current irritability, agitation, or aggression? Yes  Scoring note: BOTH 1a and 1b must be yes for it to score 1 point, if both are not yes it is zero. All others are 1 point per number. If all questions 1a/1b - 6 are no, risk is negligible. If one of 1a/1b is yes, then risk is mild. If either question 2 or 3, but not both, is yes, then risk is automatically moderate regardless of total score. If both 2 and 3 are yes, risk is automatically high regardless of total score.      Score: 3, moderate risk      Does the patient have access to lethal means? No     Does the patient engage in non-suicidal self-injurious behavior (NSSI/SIB)? Yes, biting, head banging     Does the patient have thoughts of harming others? No     Is the patient engaging in sexually inappropriate behavior?  no        Current Substance Abuse     Is there recent substance abuse? no     Was a urine drug screen or blood alcohol level obtained: Yes, negative       Mental Status Exam     Affect: Constricted   Appearance: Appropriate    Attention Span/Concentration: Attentive  Eye Contact: Engaged   Fund of Knowledge: Appropriate    Language /Speech Content: Fluent   Language /Speech Volume: Normal    Language /Speech Rate/Productions:  "Articulate    Recent Memory: Intact   Remote Memory: Intact   Mood: Anxious and Depressed    Orientation to Person: Yes    Orientation to Place: Yes   Orientation to Time of Day: Yes    Orientation to Date: Yes    Situation (Do they understand why they are here?): Yes    Psychomotor Behavior: Normal    Thought Content: Hallucinations   Thought Form: Intact      History of commitment: No      Medication    Psychotropic medications: Yes, Astrada, Effexor, Trazodone, Hydroxyzine, Cogentin   Medication changes made in the last two weeks: No       Current Care Team    Primary Care Provider: Yes, Jess Wilkins MD - [facility]  Psychiatrist: Yes, Emma Jacobson NP - [facility]  Therapist: Yes, Rd Barton MA, [facility] - [facility]  : Yes     Other: Yes, group home      Diagnosis    Borderline Personality D/O F60.3  Unspecified Intellectual Disability F79  Bipolar, unspecified F31.9 by history    Clinical Summary and Substantiation of Recommendations    Pt presents for assessment of suicidal thoughts and psychotic symptoms in a pt with limited coping skills, risk elevated at baseline, no acute change in symptoms, pt currently in residential placement with /7 care and monitoring, inpatient admission not believed to be appropriate as symptoms are chronically present and pt has minimal history of acting on thoughts which would put her in acute danger (self injury is superficial), recommend completion of medication evaluation, discharge, take medications as prescribed, keep scheduled appointments, utilize community crisis services or ED if symptoms worsen.  Disposition    Recommended disposition: Group Home: Return to group home       Reviewed case and recommendations with attending provider. Attending Name: Dr. Sena       Attending concurs with disposition: Yes       Patient concurs with disposition: No: \"I'm not stable\"       Guardian concurs with disposition: NA      Final disposition: Group home: Return to group home . "     Outpatient Details (if applicable):   Aftercare plan and appointments placed in the AVS and provided to patient: Yes. Given to patient by LMHP orally and by RN in writing      Assessment Details    Patient interview started at: 1930 and completed at: 2030.     Total duration spent on the patient case in minutes: 1.0 hrs      CPT code(s) utilized: 83302 - Psychotherapy for Crisis - 60 (30-74*) min       Morgan Robles, ZEESHAN, LICSW, ROSA  DEC - Triage & Transition Services  Callback: [phone]

## 2022-09-22 NOTE — ED NOTES
Called group home will take the patient back. Patient will get her morning medications at the  and her Abilify injection.

## 2022-09-23 ENCOUNTER — NURSE TRIAGE (OUTPATIENT)
Dept: NURSING | Facility: CLINIC | Age: 22
End: 2022-09-23

## 2022-09-23 NOTE — ED TRIAGE NOTES
Pt is here complaining of ongoing anxiety, hallucinations of her fathers spirit talking to her and her having the urge to slit her throat. Pt denies any self harm.     Triage Assessment     Row Name 09/22/22 2021       Triage Assessment (Adult)    Airway WDL WDL       Respiratory WDL    Respiratory WDL WDL       Skin Circulation/Temperature WDL    Skin Circulation/Temperature WDL WDL       Cardiac WDL    Cardiac WDL WDL       Peripheral/Neurovascular WDL    Peripheral Neurovascular WDL WDL       Cognitive/Neuro/Behavioral WDL    Cognitive/Neuro/Behavioral WDL WDL

## 2022-09-23 NOTE — TELEPHONE ENCOUNTER
"Nurse Triage SBAR    Situation:   Feels shackey and paranoid.    Background:   -Patient is calling, It is okay to leave a detailed message at this number.   -Seen in ED yesterday and sent home    Assessment:   -Has been seed in ED multiple time -\"always send me home\" and that \"I need to be admitted\"   -Hearing voices telling her to jump off a bridge  -Seeing violent spirits  -She is treating to hurt herself- she has no plans on how to do this  -She has thoughts of hurting her group home staff - no plans on how to do this  -There are no weapons in the group home  -She lost her dad last year - and has a difficlut relationship with her birth mom.  -Just got back from a birthday party and felt paranoid - ongoing since yesterday    -She is feels panicked and  icik and fells paiond   -Raceing heart  -lightheadedness    -Dizziness   -Racing thoughts  -No LOC    -The  were there this morning - she is requesting paramednis only and no police.  The RN informed her that she could not promiss this to her.  -She is naeem for safety     -Pateint consented to RN calling 911 on her behalf  -RN called 911   -RN called patient back - again she is naeem for safety to not hurt herself or staff until EMS arrival     Protocol Recommended Disposition:   Call  Now, Go to ED Now (Or PCP Triage)    Recommendation:   RN called 911      Reason for Disposition    [1] Patient is threatening suicide now AND [2] willing to come in    Seeing, hearing or feeling things that are not there    Additional Information    Negative: [1] Patient has attempted suicide AND [2] has major injuries or serious overdose    Negative: [1] Patient is threatening suicide AND [2] has a deadly weapon (e.g.,  firearm, knife)    Negative: Suicide attempt in progress now and can't be stopped    Negative: [1] Patient is threatening suicide now AND [2] unwilling to go to ER or combative(Caution: police may be needed)    Negative: Homicidal behavior is " the main concern    Negative: [1] Depression concerns or grief reaction BUT [2] NO suicidal thoughts or concerns    Negative: [1] Postpartum depression BUT [2] NO suicidal thoughts or concerns    Negative: [1] Patient has attempted suicide today AND [2] has minor injuries or overdose    Negative: [1] Caller expresses suicidal thoughts AND [2] hangs up AND [3] triager unable to complete triage    Negative: Sounds like a life-threatening emergency to the triager    Protocols used: SUICIDE [id number]

## 2022-09-23 NOTE — TELEPHONE ENCOUNTER
"Patient calling in today stating she was seen yesterday for suicidal ideation. She is asking for hospital stay to help her. \"You guys always send me home\". \"I was about to cut myself\". \"I need the proper help\".   \"I almost cut myself with a butter knife on my wrist\".  Stated she was going to tear stuff up at the hospital and \"shit is going to hit the fan\". \"I'm going to start punching shit if I come back\" and I  f I get discharged again I am going to do some harmful things to myself in front of the doctors.\"   Per RN protocol patient should call 911.   911 was called for patient by triage nurse.  Per 911 police were being dispatched.   Supervisor was notified in triage and ER staff was notified.   Called [facility] and spoke to nurse manager Roxi to alert of potential safety concern with the patient.   Patricia Coleman RN on 9/23/2022 at 8:23 AM      Reason for Disposition    Patient is threatening suicide now    Violent behavior, or threatening to physically hurt or kill someone    Additional Information    Negative: Patient attempted suicide    Protocols used: SUICIDE [id number]      "

## 2022-09-23 NOTE — DISCHARGE INSTRUCTIONS
You have been seen in the emergency department today for your mental health issues.  It is very important that you follow-up with your outpatient psychiatrist and therapist.  You do not need to be admitted for chronic issues - it is critical that you talk with your group home staff and your outpatient mental health team.    Aftercare Plan    If I am feeling unsafe or I am in a crisis, I will:   Contact my established care providers   Call the [place] Suicide Prevention Lifeline: [phone]   Go to the nearest emergency room   Call [phone]     Warning signs that I or other people might notice when a crisis is developing for me: isolation, loss of appetite, increase in depression, loss of interest in preferred activities, suicidal thoughts, suicide attempt, excessive or uncontrollable crying; increased aggression; self injurious behavior    Things I am able to do on my own to cope or help me feel better:     Grounding Techniques:  Try to notice where you are, your surroundings including the people, the sounds like the TV or radio.  Concentrate on your breathing. Take a deep cleansing breath from your diaphragm. Count the breaths as you exhale. Make sure you breath slowly.  Hold something that you find comforting, for some it may be a stuffed animal or a blanket. Notice how it feels in your hands. Is it hard or soft?  During a non-crisis time make a list of positive affirmations. Print them out and keep them handy for times of intense anxiety. At those times, read them aloud.  Try the Curves game:  Name 5 things you can see in the room with you  Name 4 things you can feel ( chair on my back  or  feet on floor )   Name 3 things you can hear right now ( people talking  or  tv )   Name 2 things you can smell right now (or, 2 things you like the smell of)   Name 1 good thing about yourself  Create A Safe Place  Image a safe place -- it can be a real or imaginary place:   What do you see -- especially colors?   What sounds do  you hear?   What sensations do you feel?   What smells do you smell?   What people or animals would you want in your safe place?   Imagine a protective bubble, wall or boundary around your safe place.   Imagine a door or gate with a guard at your safe place.   Image a lock and key to your safe place and only you can unlock it.  You can draw or make a collage that represents your safe place.   Choose a souvenir of your safe place -- a color, an object, a song.   Keep your image of your safe place so you can come back to it when you need to.    Things that I am able to do with others to cope or help me feel better: reach out to therapist and other mental health providers, reach out to family members and family friends, identify what makes life worth living; distracting strategies, reach out to crisis lines    Things I can use or do for distraction:     Sing in the shower; take a long walk; count your blessings; read a poem; think about your pet; take a deep breath; let out a big sigh; Buy yourself flowers; Jump rope; Make a  to do  list; Count to 10; Keep a diary; Journal daily; Plant flowers; Do a good deed; Set flexible deadlines; Call a friend; Listen hard; Believe in yourself; Read a good book; See a live play; Write to a friend; Forgive and forget; Eat by candlelight; Go to a ball game; Take a bubble bath; End a bad habit; Listen to music; Share what you have; Don t drink and drive; Be in the moment; Set realistic goals ; Eat right ; Hug a friend; Say something nice; Whistle a tune; Stretch a lot; Watch your weight; Walk instead of drive; Sleep late on weekends; Read the comics; Focus on your task; Don t take drugs; Make a gratitude list; Laugh a lot; Be kind to others; Cry when you can; Praise others; Play with your sibling; Set priorities; Reward yourself often; Massage tense muscles; Do the hard tasks first; Take a long bike ride; Smell a flower; Take a personal day; Draw a picture; Avoid negative people;  "Dance by yourself; Memorize a new song; Start a new hobby; Ask for a hug ; Think positive thoughts; Do volunteer work; Go to bed an hour early; Talk less; Don t procrastinate; Avoid the small stuff; Don t eat junk food; Start a support group; Call your grandparents; Meditate each day; Remember your successes; Get medical checkups; Turn off the noise; Clean up the clutter; Find the silver lining; Let others be themselves; Walk in the rain; Donate to Buz; Exercise 20 minutes a day; Start school work early; Set daily goals for yourself; Visualize a peaceful scene; Schedule play time each day; Give the benefit of the doubt  ; See problems as opportunities; Budget your time and money; Make a duplicate set of keys; Break big jobs into small tasks; Teach someone something new; Emphasize your strengths ; Admit you don t know it all; Hum your favorite tune; Take care of your own needs; Remember feelings are not facts; Remember:  this too shall pass;\" Drink a glass of water before bedtime; Change your route to work/school; Develop alternative plans; Avoid seeing, listening to; Reading bad news    Changes I can make to support my mental health and wellness: attend all scheduled mental health appointments; get enough/good sleep; take medications as prescribed; eat a healthy diet; get enough exercise; identify consistent relaxation strategies; develop a routine    People in my life that I can ask for help: outpatient treatment team; family; friends; crisis lines    Crisis Lines    Your [facility] has a mental health crisis team you can call 24/7: [facility] Mobile Crisis  [phone] (adults)  [phone] (children)     Crisis Text Line  Text 880910  You will be connected with a trained live crisis counselor to provide support.  Por espanol, texto  SIRENA a 921800 o texto a 442-AYUDAME en WhatsApp  National Hope Line  1.800.SUICIDE [3867405]  Crisis Intervention: 456.603.1994 or 347-281-0933 (TTY: 196.103.1142).  Call " "anytime for help.  Suicide Awareness Voices of Education (SAVE) (www.save.org): 888-511-SAVE 7283)  Text 4 Life: txt \"LIFE\" to 70540 for immediate support and crisis intervention    Community Resources    Fast Tracker  Linking people to mental health and substance use disorder resources  [place].org   [place] Mental Health Warm Line  Peer to peer support  Monday thru Saturday, 12 pm to 10 pm  560.997.0455 or 8.344.616.7754  Text \"Support\" to 47841  National [place] on Mental Illness (MAGY)  870.906.5243 or 1.888.MAGY.HELPS    Mental Health Apps    My3  https://Loccie.org/  VirtualHopeBox  https://bCommunities/apps/virtual-hope-box/    Additional Information  Today you were seen by a licensed mental health professional through Triage and Transition services, Behavior[facility] Providers ([facility])  for a crisis assessment in the Emergency Department at [facility].  It is recommended that you follow up with your established providers (psychiatrist, mental health therapist, and/or primary care doctor - as relevant) as soon as possible. Coordinators from [facility] will be calling you in the next 24-48 hours to ensure that you have the resources you need.  You can also contact [facility] coordinators directly at [phone]. You may have been scheduled for or offered an appointment with a mental health provider. [facility] maintains an extensive network of licensed behavioral health providers to connect patients with the services they need.  We do not charge providers a fee to participate in our referral network.  We match patients with providers based on a patient's specific needs, insurance coverage, and location.  Our first effort will be to refer you to a provider within your care system, and will utilize providers outside your care system as needed.   "

## 2022-09-23 NOTE — ED NOTES
"Patient presents to ED with suicidal thoughts and plan and verbalized plan of killing by \"bitting self and hitting head on wall\". Patient states suicidal thoughts started yesterday after leaving ED. Denies alcohol or drug use. Verbalized visual hallucination and states \"I am seeing spirits of dead people\". Denies homicidal thoughts or plan. Verbalized generalized body ache and rates pain as \"9\" on pain scale. Calm and cooperating with assessment.  "

## 2022-09-24 ENCOUNTER — NURSE TRIAGE (OUTPATIENT)
Dept: NURSING | Facility: CLINIC | Age: 22
End: 2022-09-24

## 2022-09-24 ENCOUNTER — HOSPITAL ENCOUNTER (EMERGENCY)
Facility: CLINIC | Age: 22
Discharge: HOME | End: 2022-09-24
Attending: STUDENT IN AN ORGANIZED HEALTH CARE EDUCATION/TRAINING PROGRAM | Admitting: STUDENT IN AN ORGANIZED HEALTH CARE EDUCATION/TRAINING PROGRAM
Payer: MEDICARE

## 2022-09-24 VITALS
TEMPERATURE: 98.4 F | HEART RATE: 98 BPM | DIASTOLIC BLOOD PRESSURE: 82 MMHG | SYSTOLIC BLOOD PRESSURE: 132 MMHG | OXYGEN SATURATION: 98 % | RESPIRATION RATE: 12 BRPM

## 2022-09-24 DIAGNOSIS — R45.851 SUICIDAL IDEATION: ICD-10-CM

## 2022-09-24 PROCEDURE — 99283 EMERGENCY DEPT VISIT LOW MDM: CPT | Performed by: STUDENT IN AN ORGANIZED HEALTH CARE EDUCATION/TRAINING PROGRAM

## 2022-09-24 PROCEDURE — 99285 EMERGENCY DEPT VISIT HI MDM: CPT | Mod: 25 | Performed by: STUDENT IN AN ORGANIZED HEALTH CARE EDUCATION/TRAINING PROGRAM

## 2022-09-24 PROCEDURE — 90791 PSYCH DIAGNOSTIC EVALUATION: CPT

## 2022-09-24 ASSESSMENT — ACTIVITIES OF DAILY LIVING (ADL)
ADLS_ACUITY_SCORE: 37
ADLS_ACUITY_SCORE: 37

## 2022-09-24 NOTE — TELEPHONE ENCOUNTER
"Patient is calling about suicidal ideation. She states that she is thinking about attempting suicide soon or within the next couple of hours. She did not verbalize a specific plan when asked. Writer recommended patient call 911 for EMS now.    She says she is trying to get to the hospital. She called law enforcement several times yesterday for help to get to the hospital. She is having suicidal ideation and is feeling paranoid. She states that she will \"get in trouble\" if she calls law enforcement for help; she says that they told her to find an uber or lyft to bring her in to the hospital if she is having suicidal ideation.    Patient sounds alert and oriented. She states there is another adult present with her. This person is her group home staff, and she states they are sleeping and she does not want to wake them up. Writer requested patient wake staff if writer can talk to them on her behalf.    Group home staff spoke with writer on the phone. Writer urged group home staff to call 911 due to suicidal ideation. Group home staff states that patient \"calls every day\" and that this is just \"behavioral.\" Writer reiterated the severity of suicidal ideation and recommendation to be evaluated immediately. Writer asked if group home staff will call 911 on behalf of the patient, she hesitated and mumbled that she would.    Writer called to make Vulnerable Report for patient for potential medical neglect. Report #: [id number]    Writer called patient back. Patient states that staff did not call 911 for EMS. Patient states that the hospital staff \"wouldn't do anything for her\" anyway and \"truth hurts.\" Writer asked patient if writer can call 911 on her behalf, and she said she will call them instead next. Writer gave patient the 24/7 Suicide Prevention Lifeline: 1-292.175.1349.    Writer called [facility] Dispatch to request EMS for safety check. Dispatch states that an ambulance is already at patient's " address.    Kemi Santiago RN  [facility] Nurse Advisor  6:14 PM  9/24/2022            Reason for Disposition    Patient is threatening suicide now    Additional Information    Negative: Patient attempted suicide    Protocols used: SUICIDE [id number]

## 2022-09-24 NOTE — TELEPHONE ENCOUNTER
S-(situation): Call from patient who says she has on-going suicidal ideation. She reports crying a lot, staying up past midnight, and over thinking on stuff. Patient says she thinking about death all the time; says she don't want to die; says she talks about God and Efrain; says God does not want her to die.    Patient says her dad was her biggest support; mom does not support her or other siblings and is an alcoholic.    Other supports: friend, uncle, grandmothers, providers.    Patient reports she has called warm line and cope.    Patient says she spoke to another triage nurse and she spoke to her housing staff who wanted her to call back and let the nurse know that the staff wants her to go to the ED too but going to the ED is not helful; they don't try to help her.    Patient says all she is trying to do is get the help she needs. Harmful behaviors: biting self until she bruises, says she has not drawn blood. She last choked herself w/ her hands in the past about 2 years before he did .    Patient had to go before call was completed. She says she will call back.    B-(background): previous call today w/ FNA  who recommended ED visit.      A-(assessment):       R-(recommendations):         Debi Barnes RN/[facility]        Reason for Disposition    Depression symptoms (sadness, hopelessness, decreased energy) interfere with work or school    Additional Information    Negative: Patient attempted suicide    Negative: Patient is threatening suicide now    Negative: Violent behavior, or threatening to physically hurt or kill someone    Negative: [1] Patient is very confused (disoriented, slurred speech) AND [2] no other adult (e.g., friend or family member) available    Negative: [1] Difficult to awaken or acting very confused (disoriented, slurred speech) AND [2] new-onset    Negative: Sounds like a life-threatening emergency to the triager    Negative: [1] Depression symptoms (sadness,  hopelessness, decreased energy) AND [2] unable to do any normal activities (e.g., self care, school, work; in comparison to baseline).    Negative: Patient sounds very sick or weak to the triager    Negative: [1] Patient is not threatening suicide now BUT [2] has a suicide PLAN (e.g., overdose, gunshot) and ACCESS (e.g., collecting pills, gun in house)    Negative: [1] Patient is not threatening suicide now BUT [2] had SUICIDAL BEHAVIORS in past 3 months    Protocols used: SUICIDE [id number]

## 2022-09-24 NOTE — TELEPHONE ENCOUNTER
Sadaf calls and says that the  have been out to see her 5 times today. Pt. Says that she is having difficulties. Pt. Says that she has a lot on her mind. Pt. feels suicidal. Pt. Will call 911 now. COVID 19 Nurse Triage Plan/Patient Instructions    Please be aware that novel coronavirus (COVID-19) may be circulating in the community. If you develop symptoms such as fever, cough, or SOB or if you have concerns about the presence of another infection including coronavirus (COVID-19), please contact your health care provider or visit [email or web address].     Disposition/Instructions    Call to EMS/911 recommended. Follow protocol based instructions.     Bring Your Own Device:  Please also bring your smart device(s) (smart phones, tablets, laptops) and their charging cables for your personal use and to communicate with your care team during your visit.    Thank you for taking steps to prevent the spread of this virus.  o Limit your contact with others.  o Wear a simple mask to cover your cough.  o Wash your hands well and often.    Resources    [facility]: About COVID-19: [email or web address]    CDC: What to Do If You're Sick: www.cdc.gov/coronavirus/2019-ncov/about/steps-when-sick.html    CDC: Ending Home Isolation: www.cdc.gov/coronavirus/2019-ncov/hcp/disposition-in-home-patients.html     CDC: Caring for Someone: www.cdc.gov/coronavirus/2019-ncov/if-you-are-sick/care-for-someone.html     [facility]: Interim Guidance for Hospital Discharge to Home: www.health.[facility].mn.us/diseases/coronavirus/hcp/hospdischarge.pdf    [facility] clinical trials (COVID-19 research studies): [email or web address]-clinical-trials     Below are the COVID-19 hotlines at the [place] Department of Health ([facility]). Interpreters are available.   o For health questions: Call 568-840-0649 or 1-990.712.3412 (7 a.m. to 7 p.m.)  o For questions about schools and childcare: Call 203-193-5950 or 1-380.682.7977  (7 a.m. to 7 p.m.)                 COVID 19 Nurse Triage Plan/Patient Instructions    Please be aware that novel coronavirus (COVID-19) may be circulating in the community. If you develop symptoms such as fever, cough, or SOB or if you have concerns about the presence of another infection including coronavirus (COVID-19), please contact your health care provider or visit [email or web address].     Disposition/Instructions    Call to EMS/911 recommended. Follow protocol based instructions.     Bring Your Own Device:  Please also bring your smart device(s) (smart phones, tablets, laptops) and their charging cables for your personal use and to communicate with your care team during your visit.    Thank you for taking steps to prevent the spread of this virus.  o Limit your contact with others.  o Wear a simple mask to cover your cough.  o Wash your hands well and often.    Resources    [facility]: About COVID-19: [email or web address]                       Reason for Disposition    Patient is threatening suicide now    Additional Information    Negative: Patient attempted suicide    Protocols used: SUICIDE [id number]

## 2022-09-24 NOTE — ED NOTES
Bed: HW08UR  Expected date: 9/24/22  Expected time: 6:15 PM  Means of arrival: Ambulance  Comments:  [address],  20yo female, from group home, mental health eval

## 2022-09-24 NOTE — ED TRIAGE NOTES
Patient arrived via EMS. Patient reports that crisis hotline called 911. Patient endorses SI with a plan to bite herself to death.      Triage Assessment     Row Name 09/24/22 2817       Triage Assessment (Adult)    Airway WDL WDL       Respiratory WDL    Respiratory WDL WDL       Skin Circulation/Temperature WDL    Skin Circulation/Temperature WDL WDL       Peripheral/Neurovascular WDL    Peripheral Neurovascular WDL WDL

## 2022-09-25 ENCOUNTER — NURSE TRIAGE (OUTPATIENT)
Dept: NURSING | Facility: CLINIC | Age: 22
End: 2022-09-25

## 2022-09-25 ENCOUNTER — HOSPITAL ENCOUNTER (EMERGENCY)
Facility: CLINIC | Age: 22
Discharge: NURSING, REHAB OR LONG-TERM CARE | End: 2022-09-25
Attending: EMERGENCY MEDICINE
Payer: MEDICARE

## 2022-09-25 ENCOUNTER — HOSPITAL ENCOUNTER (EMERGENCY)
Facility: CLINIC | Age: 22
Discharge: HOME | End: 2022-09-25
Attending: EMERGENCY MEDICINE | Admitting: EMERGENCY MEDICINE
Payer: MEDICARE

## 2022-09-25 ENCOUNTER — TELEPHONE (OUTPATIENT)
Dept: NURSING | Facility: CLINIC | Age: 22
End: 2022-09-25

## 2022-09-25 ENCOUNTER — HOSPITAL ENCOUNTER (EMERGENCY)
Facility: CLINIC | Age: 22
Discharge: HOME | End: 2022-09-25
Attending: EMERGENCY MEDICINE
Payer: MEDICARE

## 2022-09-25 VITALS
RESPIRATION RATE: 16 BRPM | DIASTOLIC BLOOD PRESSURE: 77 MMHG | BODY MASS INDEX: 39.95 KG/M2 | OXYGEN SATURATION: 98 % | HEIGHT: 64 IN | SYSTOLIC BLOOD PRESSURE: 121 MMHG | WEIGHT: 234 LBS | TEMPERATURE: 99.2 F | HEART RATE: 79 BPM

## 2022-09-25 VITALS
DIASTOLIC BLOOD PRESSURE: 83 MMHG | HEART RATE: 76 BPM | RESPIRATION RATE: 12 BRPM | TEMPERATURE: 98.6 F | SYSTOLIC BLOOD PRESSURE: 121 MMHG | OXYGEN SATURATION: 98 %

## 2022-09-25 VITALS
RESPIRATION RATE: 18 BRPM | DIASTOLIC BLOOD PRESSURE: 86 MMHG | OXYGEN SATURATION: 97 % | SYSTOLIC BLOOD PRESSURE: 140 MMHG | TEMPERATURE: 98.4 F | HEART RATE: 100 BPM

## 2022-09-25 DIAGNOSIS — F79 INTELLECTUAL DISABILITY: ICD-10-CM

## 2022-09-25 DIAGNOSIS — F60.3 BORDERLINE PERSONALITY DISORDER (H): ICD-10-CM

## 2022-09-25 DIAGNOSIS — R45.851 SUICIDAL IDEATION: ICD-10-CM

## 2022-09-25 DIAGNOSIS — F32.A DEPRESSION, UNSPECIFIED DEPRESSION TYPE: ICD-10-CM

## 2022-09-25 DIAGNOSIS — F90.9 ATTENTION DEFICIT HYPERACTIVITY DISORDER: ICD-10-CM

## 2022-09-25 DIAGNOSIS — F31.9 BIPOLAR DISORDER, UNSPECIFIED (H): ICD-10-CM

## 2022-09-25 LAB
ALBUMIN UR-MCNC: NEGATIVE MG/DL
APPEARANCE UR: CLEAR
BILIRUB UR QL STRIP: NEGATIVE
COLOR UR AUTO: ABNORMAL
GLUCOSE UR STRIP-MCNC: NEGATIVE MG/DL
HCG UR QL: NEGATIVE
HGB UR QL STRIP: NEGATIVE
KETONES UR STRIP-MCNC: NEGATIVE MG/DL
LEUKOCYTE ESTERASE UR QL STRIP: NEGATIVE
MUCOUS THREADS #/AREA URNS LPF: PRESENT /LPF
NITRATE UR QL: NEGATIVE
PH UR STRIP: 5.5 [PH] (ref 5–7)
RBC URINE: 1 /HPF
SP GR UR STRIP: 1.02 (ref 1–1.03)
SQUAMOUS EPITHELIAL: 4 /HPF
UROBILINOGEN UR STRIP-MCNC: NORMAL MG/DL
WBC URINE: 3 /HPF

## 2022-09-25 PROCEDURE — 90791 PSYCH DIAGNOSTIC EVALUATION: CPT

## 2022-09-25 PROCEDURE — 99283 EMERGENCY DEPT VISIT LOW MDM: CPT | Mod: 27 | Performed by: EMERGENCY MEDICINE

## 2022-09-25 PROCEDURE — 250N000013 HC RX MED GY IP 250 OP 250 PS 637: Performed by: EMERGENCY MEDICINE

## 2022-09-25 PROCEDURE — 99285 EMERGENCY DEPT VISIT HI MDM: CPT | Mod: 27 | Performed by: EMERGENCY MEDICINE

## 2022-09-25 PROCEDURE — 81025 URINE PREGNANCY TEST: CPT | Performed by: EMERGENCY MEDICINE

## 2022-09-25 PROCEDURE — 81001 URINALYSIS AUTO W/SCOPE: CPT | Performed by: EMERGENCY MEDICINE

## 2022-09-25 PROCEDURE — 99284 EMERGENCY DEPT VISIT MOD MDM: CPT | Performed by: EMERGENCY MEDICINE

## 2022-09-25 PROCEDURE — 99285 EMERGENCY DEPT VISIT HI MDM: CPT | Mod: 25

## 2022-09-25 RX ORDER — MAGNESIUM HYDROXIDE/ALUMINUM HYDROXICE/SIMETHICONE 120; 1200; 1200 MG/30ML; MG/30ML; MG/30ML
30 SUSPENSION ORAL ONCE
Status: COMPLETED | OUTPATIENT
Start: 2022-09-25 | End: 2022-09-25

## 2022-09-25 RX ORDER — ACETAMINOPHEN 500 MG
500 TABLET ORAL ONCE
Status: COMPLETED | OUTPATIENT
Start: 2022-09-25 | End: 2022-09-25

## 2022-09-25 RX ADMIN — ACETAMINOPHEN 500 MG: 500 TABLET ORAL at 20:04

## 2022-09-25 RX ADMIN — ALUMINUM HYDROXIDE, MAGNESIUM HYDROXIDE, AND SIMETHICONE 30 ML: 200; 200; 20 SUSPENSION ORAL at 20:03

## 2022-09-25 ASSESSMENT — ACTIVITIES OF DAILY LIVING (ADL)
ADLS_ACUITY_SCORE: 37

## 2022-09-25 ASSESSMENT — ENCOUNTER SYMPTOMS
DYSPHORIC MOOD: 1
NERVOUS/ANXIOUS: 1

## 2022-09-25 NOTE — DISCHARGE INSTRUCTIONS
Aftercare Plan  If I am feeling unsafe or I am in a crisis, I will:   Contact my established care providers   Call the National Suicide Prevention Lifeline: 988  Go to the nearest emergency room   Call 911      Warning signs that I or other people might notice when a crisis is developing for me: I start become panicky, I start to look around me seeing if people are following me, I start pacing around, and I stop reaching out to my supports.     Things I am able to do on my own to cope or help me feel better: Listen to music, go for walks, rest, deep breaths, and grounding exercises.  Grounding Techniques:  Try to notice where you are, your surroundings including the people, the sounds like the TV or radio.  Concentrate on your breathing. Take a deep cleansing breath from your diaphragm. Count the breaths as you exhale. Make sure you breath slowly.  Hold something that you find comforting, for some it may be a stuffed animal or a blanket. Notice how it feels in your hands. Is it hard or soft?  During a non-crisis time make a list of positive affirmations. Print them out and keep them handy for times of intense anxiety. At those times, read them aloud.  Try the [employer] game:  Name 5 things you can see in the room with you  Name 4 things you can feel ( chair on my back  or  feet on floor )   Name 3 things you can hear right now ( people talking  or  tv )   Name 2 things you can smell right now (or, 2 things you like the smell of)   Name 1 good thing about yourself  Create A Safe Place  Image a safe place -- it can be a real or imaginary place:   What do you see -- especially colors?   What sounds do you hear?   What sensations do you feel?   What smells do you smell?   What people or animals would you want in your safe place?   Imagine a protective bubble, wall or boundary around your safe place.   Imagine a door or gate with a guard at your safe place.   Image a lock and key to your safe place and only you can unlock  it.  You can draw or make a collage that represents your safe place.   Choose a souvenir of your safe place -- a color, an object, a song.   Keep your image of your safe place so you can come back to it when you need to.     Reduce Extreme Emotion  QUICKLY:  Changing Your Body Chemistry      T:  Change your body Temperature to change your autonomic nervous system   Use Ice Water to calm yourself down FAST   Put your face in a bowl of ice water (this is the best way; have the person keep his/her face in ice water for 30-45 seconds - initial research is showing that the longer s/he can hold her/his face in the water, the better the response), or   Splash ice water on your face, or hold an ice pack on your face      I:  Intensely exercise to calm down a body revved up by emotion   Examples: running, walking fast, jumping, playing basketball, weight lifting, swimming, calisthenics, etc.   Engage in exercises that DO NOT include violent behaviors. Exercises that utilize violent behaviors tend to function as  behavioral rehearsal,  and rather than calming the person down, may actually  rev  the person up more, increasing the likelihood of violence, and lessening the likelihood that they will  burn off  energy     P:  Progressively relax your muscles   Starting with your hands, moving to your forearms, upper arms, shoulders, neck, forehead, eyes, cheeks and lips, tongue and teeth, chest, upper back, stomach, buttocks, thighs, calves, ankles, feet   Tense (10 seconds,   of the way), then relax each muscle (all the way)   Notice the tension   Notice the difference when relaxed (by tensing first, and then relaxing, you are able to get a more thorough relaxation than by simply relaxing)      P: Paced breathing to relax   The standard technique is to begin with counting the number of steps one takes for a typical inhale, then counting the steps one takes for a typical exhale, and then lengthening the amount of steps for the  "exhalation by one or two steps.  OR  Repeat this pattern for 1-2 minutes  Inhale for four (4) seconds   Exhale for six (6) to eight (8) seconds   Research demonstrated that one can change one's overall level of anxiety by doing this exercise for even a few minutes per day         Things that I am able to do with others to cope or help me better: Listen to music, talk with crisis line, or talk it out with my supports.     Things I can use or do for distraction: Music, walks, and rest.     Changes I can make to support my mental health and wellness: Seek out my supports and talk with them when I need to feel heard and understood.      People in my life that I can ask for help: People I trust such as my grandmas, uncle, staff members at my group home, and Ruby at group home.      Your [facility] has a mental health crisis team you can call 24/7: [facility] Mobile Crisis  [phone]      Other things that are important when I'm in crisis: That people are here to support me.      Additional resources and information:      Crisis Lines  Crisis Text Line  Text 631392  You will be connected with a trained live crisis counselor to provide support.     Por espanol, texto  SIRENA a 559021 o texto a 442-AYUDAME en WhatsApp     [employer] (LGBTQ Youth Crisis Line)  0.199.038.3632  text START to 584-945        Community Resources  Fast Tracker  Linking people to mental health and substance use disorder resources  fasttrackermn.org      [place] Mental Health Warm Line  Peer to peer support  Monday thru Saturday, 12 pm to 10 pm  996.925.8624 or 3.208.214.9967  Text \"Support\" to 05753     National [employer] Mental [employer]ness (MAGY)  821.337.2185 or 1.888.MAGY.HELPS        Mental Health Apps  My3  https://myLandingipp.org/     VirtualHopeBox  https://P4RC.org/apps/virtual-hope-box/        Additional Information  Today you were seen by a licensed mental health professional through Triage and " Transition services, [facility]iders ([facility])  for a crisis assessment in the Emergency Department at [facility].  It is recommended that you follow up with your established providers (psychiatrist, mental health therapist, and/or primary care doctor - as relevant) as soon as possible. Coordinators from [facility] will be calling you in the next 24-48 hours to ensure that you have the resources you need.  You can also contact [facility] coordinators directly at [phone]. You may have been scheduled for or offered an appointment with a mental health provider. [facility] maintains an extensive network of licensed behavioral health providers to connect patients with the services they need.  We do not charge providers a fee to participate in our referral network.  We match patients with providers based on a patient's specific needs, insurance coverage, and location.  Our first effort will be to refer you to a provider within your care system, and will utilize providers outside your care system as needed.

## 2022-09-25 NOTE — TELEPHONE ENCOUNTER
"S: Suicidal thoughts  B: Pt states she's had depression and suicidal thoughts since her father  about 2 years ago. Patients is frustrated about the care she received in the ED today.  Has medical HX of borderline personality disorder,  bipolar disorder, and intellectual disorder. Has been to the ED nine times in September.       Wants to cut wrist and bite self     \"I want to die to be with my dad\"  \"I want to committee suicide every day\"    \"The devil is talking to me.\"     \"The devil is telling me to slash my neck\"      \"I want to see the Lord\"    \"Feels she is not being heard\"    Mood is anxious    \"Lord is telling me I need the help\"    \"Lord is telling the devil to leave me alone\"    \"Reality is hard to face\"    \"I want to change who I want to be.\"    \"Seeing dead people\"    \"Seeing my dad's spirt\"    \"My brain is overflowing it is going to explode\"    \"Wants to feel pain\"    \"I am a cutter\"    Her plan is to use a knife to cut herself.  She prays to the Lord.  Tells the devil to go away.  Lives at a group home. Is seeing thing that are scary in her room .  Is hearing voices.  Having anger issues and throws things. Racing thoughts and talking fast and loudly.   Wishing the medications would help take away hearing voices.  At times refuses to take her medications.    A: Per protocol call 911.   R: Patient states she will call 911 herself.    Autumn Alcaraz RN, MA  [facility] Triage Nurse Advisor    Reason for Disposition    Patient is threatening suicide now    Additional Information    Negative: Patient attempted suicide    Protocols used: SUICIDE [id number]      "

## 2022-09-25 NOTE — CONSULTS
"Diagnostic Evaluation Consultation  Crisis Assessment    Patient was assessed: In Person  Patient location: [facility] ED  Was a release of information signed: Yes, Jess Wilkins MD, Emma Jacobson APRN, NAIMA, Rd Barton, [facility]          Referral Data and Chief Complaint  Sadaf is a 21 year old, who uses she/her pronouns, and presents to the ED via EMS. Patient is referred to the ED by self. Patient is presenting to the ED for the following concerns: SI.      Informed Consent and Assessment Methods     Patient is her own guardian. Writer met with patient and explained the crisis assessment process, including applicable information disclosures and limits to confidentiality, assessed understanding of the process, and obtained consent to proceed with the assessment. Patient was observed to be able to participate in the assessment as evidenced by verbal consent. Assessment methods included conducting a formal interview with patient, review of medical records, collaboration with medical staff, and obtaining relevant collateral information from family and community providers when available.    Over the course of this crisis assessment provided reassurance, offered validation, engaged patient in problem solving and disposition planning, worked with patient on safety and aftercare planning, assisted in processing patient's thoughts and feeling relating to grief of loss of father and provided psychoeducation. Patient's response to interventions was engaged and cooperative     Summary of Patient Situation    Pt presents to the ED with concerns of SI. Pt reports that her dad passed away February and that ever since then she has been having SI \"all the time\" since then. Pt reports that today she called 911 and they brought EMS to pick her up due to pt reporting SI and \"I am going crazy.\" She reports that \"I have been in really deep thoughts\" and states that \"I have been talked to by the downstairs (pt clarifies as the " "Devil) more than I have been by the upstairs (God) lately.\" She reports that when she has been talking to God lately, and reports that he tells her \"you need to get the help you need.\" She states that \"I want people to respect my wishes,\" and that \"I want supportive people to talk to.\" Writer asked who her supports were and pt reports \"I have 10-20 different supports,\" and states \"my 2 grandmas, my uncle, staff at my group home such as [employer] and [employer] are supports.\" Writer encouraged pt to seek out those supports when she is feeling that she is needing supportive people to talk to.     Pt reports SI. Pt states that \"I have thoughts of biting and cutting myself.\" Pt denies SI plan or intent. Pt endorsed SIB such as \"bitting my hand.\" Pt reports she does not break skin when she bites hand. Pt states that she last bite her hand \"a few days ago.\" She reports that \"I have a life to live for.\" Pt denies HI. Pt denies VH. Pt reports AH such as \"my dad is telling me I need to get help.\" Pt denies substance use outside of nicotine.     Pt reports her coping skills are going for walks, listening to music, and resting.    Writer met with pt for assessment and pt presented calm, sad, engaged, pleasant, and oriented x 4.     Brief Psychosocial History     Pt lives in [place] in group home and has been there for about 2 years. Prior to this group home, pt was at [facility] for 3 months. The pt graduated [employer] from [employer] and had an IEP.  PT reports \"I have 10-20 different supports,\" and states \"my 2 grandmas, my uncle, staff at my group home such as [employer] and [employer] are supports.\" Pt states \"I am Sikhism and I love talking to God.\" Pt reports she lost her dad in February and is very sad about the loss.     Significant Clinical History     Pt has multiple ED visits in the past week, 09/22/22, 09/21/22, 09/19/22-09/21/22, 09/17/22, and 09/16/22 as well as previous visits prior.   Pt has a hx of inpatient " "hospitalizations with the last one being on 11/21/2021 at [facility]. Pt has hx of dx of Bipolar 1, Depression, and Borderline Personality Disorder. Pt has a PCP Jess Wilkins MD - [facility], psychiatrist Emma Jacobson NP - [facility], and therapist Rd Barton MA, [facility] - [facility].          Collateral Information     Cathie Angelo spoke with pt's group home via phone and spoke with [facility] [place] staff ([phone]).      Risk Assessment  ESS-6  1.a. Over the past 2 weeks, have you had thoughts of killing yourself? Yes  1.b. Have you ever attempted to kill yourself and, if yes, when did this last happen? No   2. Recent or current suicide plan? No   3. Recent or current intent to act on ideation? No  4. Lifetime psychiatric hospitalization? Yes  5. Pattern of excessive substance use? No  6. Current irritability, agitation, or aggression? No  Scoring note: BOTH 1a and 1b must be yes for it to score 1 point, if both are not yes it is zero. All others are 1 point per number. If all questions 1a/1b - 6 are no, risk is negligible. If one of 1a/1b is yes, then risk is mild. If either question 2 or 3, but not both, is yes, then risk is automatically moderate regardless of total score. If both 2 and 3 are yes, risk is automatically high regardless of total score.      Score: 1, mild risk      Does the patient have access to lethal means? No     Does the patient engage in non-suicidal self-injurious behavior (NSSI/SIB)? Yes, pt endorsed SIB such as \"bitting my hand.\" Pt reports she does not break skin when she bites hand. Pt states that she last bite her hand \"a few days ago.\"     Does the patient have thoughts of harming others? No     Is the patient engaging in sexually inappropriate behavior?  no        Current Substance Abuse     Is there recent substance abuse? no     Was a urine drug screen or blood alcohol level obtained: No       Mental Status Exam     Affect: Appropriate   Appearance: Appropriate    Attention " Span/Concentration: Attentive  Eye Contact: Engaged   Fund of Knowledge: Appropriate    Language /Speech Content: Fluent   Language /Speech Volume: Normal    Language /Speech Rate/Productions: Normal    Recent Memory: Intact   Remote Memory: Intact   Mood: Anxious and Depressed    Orientation to Person: Yes    Orientation to Place: Yes   Orientation to Time of Day: Yes    Orientation to Date: Yes    Situation (Do they understand why they are here?): Yes    Psychomotor Behavior: Normal    Thought Content: Suicidal   Thought Form: Intact      History of commitment: No         Medication    Psychotropic medications: Yes, Astrada, Effexor, Trazodone, Hydroxyzine, Cogentin   Medication changes made in the last two weeks: No       Current Care Team    Primary Care Provider: Jess Wilkins MD - [facility]  Psychiatrist: Emma Jacobson NP - [facility]  Therapist: Rd Barton MA, [facility]  : Yes    Other: Yes, Group home      Diagnosis    1 Borderline personality disorder F60.3  -by history   2 Bipolar I disorder, Current or most recent episode depressed, Unspecified F31.9 -by history     3 Unspecified intellectual disability (intellectual developmental disorder) F79     -by history    Clinical Summary and Substantiation of Recommendations    After therapeutic assessment, intervention and aftercare planning by ED care team and [facility] and in consultation with attending provider, the patient's circumstances and mental state were safe for outpatient management. The patient was discharged. Close follow-up with a psychiatrist and/or therapist was recommended and community psychiatric resources were provided. Patient is to return to the ED if any urgent or potentially life-threatening concerns arise.       At the time of discharge, the patient's acute suicide risk was determined to be low due to the following factors: reduction in the intensity of mood/anxiety symptoms that preceded the admission, not currently under the  influence of alcohol or illicit substances, denies experiencing command hallucinations and no immediate access to firearms. Protective factors include: social support, voluntarily seeking mental health support, displays resiliency , established relationship community mental health provider(s), rosy system, future focused thinking, displays insight, expresses desire to engage in treatment and safe/stable housing   Pt reports SI and SIB via biting hand. Pt presents with SI at baseline and there is no current acute change in symptoms or presentation. Pt is current in group home with 24/7 care and monitoring. Pt's symptoms such as depression and SI and SIB are chronic in nature and pt reports no plan or intent to act on SI. Pt denies HI.   Pt is recommended to reach out to supports when she is feeling that she needs someone to talk to such as reports listed above. Writer talked through coping skills pt can engage in when symptoms increase. Pt is recommended to continue scheduled appoitments with current providers, and to continue taking medications and prescribed.    Disposition    Recommended disposition: Group Home: Return to group home and keep scheduled appointments with current providers       Reviewed case and recommendations with attending provider. Attending Name: Kristina Cali MD.       Attending concurs with disposition: Yes       Patient concurs with disposition: Yes       Guardian concurs with disposition: NA      Final disposition: Group home: Return to group home and keep scheduled appointments with current providers .     Outpatient Details (if applicable):   Aftercare plan and appointments placed in the AVS and provided to patient: Yes. Given to patient by RN    Was lethal means counseling provided as a part of aftercare planning? No;       Assessment Details    Patient interview started at: 7:40pm and completed at: 8:05pm.     Total duration spent on the patient case in minutes: .75 hrs      CPT code(s)  utilized: 91431 - Psychotherapy for Crisis - 60 (30-74*) min       Huma Anderson MA, Psychotherapist Trainee, [facility]  DEC - Triage & Transition Services  Callback: [phone]          Aftercare Plan  If I am feeling unsafe or I am in a crisis, I will:   Contact my established care providers   Call the National Suicide Prevention Lifeline: 988  Go to the nearest emergency room   Call 911     Warning signs that I or other people might notice when a crisis is developing for me: I start become panicky, I start to look around me seeing if people are following me, I start pacing around, and I stop reaching out to my supports.    Things I am able to do on my own to cope or help me feel better: Listen to music, go for walks, rest, deep breaths, and grounding exercises.  Grounding Techniques:    Try to notice where you are, your surroundings including the people, the sounds like the TV or radio.    Concentrate on your breathing. Take a deep cleansing breath from your diaphragm. Count the breaths as you exhale. Make sure you breath slowly.    Hold something that you find comforting, for some it may be a stuffed animal or a blanket. Notice how it feels in your hands. Is it hard or soft?    During a non-crisis time make a list of positive affirmations. Print them out and keep them handy for times of intense anxiety. At those times, read them aloud.  Try the [id number] game:    Name 5 things you can see in the room with you    Name 4 things you can feel ( chair on my back  or  feet on floor )     Name 3 things you can hear right now ( people talking  or  tv )     Name 2 things you can smell right now (or, 2 things you like the smell of)     Name 1 good thing about yourself  Create A Safe Place    Image a safe place -- it can be a real or imaginary place:     What do you see -- especially colors?     What sounds do you hear?     What sensations do you feel?     What smells do you smell?     What people or animals would you want in  your safe place?     Imagine a protective bubble, wall or boundary around your safe place.     Imagine a door or gate with a guard at your safe place.     Image a lock and key to your safe place and only you can unlock it.    You can draw or make a collage that represents your safe place.     Choose a souvenir of your safe place -- a color, an object, a song.     Keep your image of your safe place so you can come back to it when you need to.    Reduce Extreme Emotion  QUICKLY:  Changing Your Body Chemistry      T:  Change your body Temperature to change your autonomic nervous system   ? Use Ice Water to calm yourself down FAST   ? Put your face in a bowl of ice water (this is the best way; have the person keep his/her face in ice water for 30-45 seconds - initial research is showing that the longer s/he can hold her/his face in the water, the better the response), or   ? Splash ice water on your face, or hold an ice pack on your face      I:  Intensely exercise to calm down a body revved up by emotion   ? Examples: running, walking fast, jumping, playing basketball, weight lifting, swimming, calisthenics, etc.   ? Engage in exercises that DO NOT include violent behaviors. Exercises that utilize violent behaviors tend to function as  behavioral rehearsal,  and rather than calming the person down, may actually  rev  the person up more, increasing the likelihood of violence, and lessening the likelihood that they will  burn off  energy     P:  Progressively relax your muscles   ? Starting with your hands, moving to your forearms, upper arms, shoulders, neck, forehead, eyes, cheeks and lips, tongue and teeth, chest, upper back, stomach, buttocks, thighs, calves, ankles, feet   ? Tense (10 seconds,   of the way), then relax each muscle (all the way)   ? Notice the tension   ? Notice the difference when relaxed (by tensing first, and then relaxing, you are able to get a more thorough relaxation than by simply relaxing)  "    P: Paced breathing to relax   ? The standard technique is to begin with counting the number of steps one takes for a typical inhale, then counting the steps one takes for a typical exhale, and then lengthening the amount of steps for the exhalation by one or two steps.  OR  ? Repeat this pattern for 1-2 minutes  ? Inhale for four (4) seconds   ? Exhale for six (6) to eight (8) seconds   ? Research demonstrated that one can change one's overall level of anxiety by doing this exercise for even a few minutes per day       Things that I am able to do with others to cope or help me better: Listen to music, talk with crisis line, or talk it out with my supports.    Things I can use or do for distraction: Music, walks, and rest.    Changes I can make to support my mental health and wellness: Seek out my supports and talk with them when I need to feel heard and understood.     People in my life that I can ask for help: People I trust such as my grandmas, uncle, staff members at my group home, and Ruby at group home.     Your [facility] has a mental health crisis team you can call 24/7: [facility] Mobile Crisis  [phone]     Other things that are important when I'm in crisis: That people are here to support me.     Additional resources and information:     Crisis Lines  Crisis Text Line  Text 696143  You will be connected with a trained live crisis counselor to provide support.    Por major, texto  SIRENA a 021841 o texto a 442-AYUDAME en WhatsApp    The [employer] (LGBTQ Youth Crisis Line)  4.525.032.3794  text START to 852-219      Community Resources  Fast Tracker  Linking people to mental health and substance use disorder resources  fasttrackermn.org     [place] Mental Health Warm Line  Peer to peer support  Monday thru Saturday, 12 pm to 10 pm  530.593.8337 or 0.522.402.3798  Text \"Support\" to 85552    National [place] on Mental Illness (MAGY)  900.778.7630 or " 1.888.MAGY.HELPS      Mental Health Apps  My3  https://myAutogridpp.org/    VirtualHopeBox  https://EmployInsight/apps/virtual-hope-box/      Additional Information  Today you were seen by a licensed mental health professional through Triage and Transition services, [facility] (P)  for a crisis assessment in the Emergency Department at [facility].  It is recommended that you follow up with your established providers (psychiatrist, mental health therapist, and/or primary care doctor - as relevant) as soon as possible. Coordinators from [facility] will be calling you in the next 24-48 hours to ensure that you have the resources you need.  You can also contact [facility] coordinators directly at [phone]. You may have been scheduled for or offered an appointment with a mental health provider. [facility] maintains an extensive network of licensed behavioral health providers to connect patients with the services they need.  We do not charge providers a fee to participate in our referral network.  We match patients with providers based on a patient's specific needs, insurance coverage, and location.  Our first effort will be to refer you to a provider within your care system, and will utilize providers outside your care system as needed.

## 2022-09-25 NOTE — TELEPHONE ENCOUNTER
Was able to reach patient at the phone number listed in her chart.     She just wanted to know what the phone number was to Patient Relations.   Says that she was not satisfied with her care in the ED and would like to speak with someone about it.  Did explain that employees in that office only work M-F, so pt would need to leave a message or call back tomorrow. Pt verbalized understanding. Transferred to [place] line.    Kristina Anderson RN, BSN  [facility]   Triage Nurse Advisor

## 2022-09-25 NOTE — TELEPHONE ENCOUNTER
S: Suicidal ideation    B: Patient calling regarding her thoughts of her dead dad and that she is having suicidal thoughts. Patient states she was in the ED yesterday.  States she is at home in a group home and people are there.     Writer talked with patient about the good memories she has had with her dad-fishing, baking cookies, working on cars.     Patients is frustrated about the care she received in the ED today. States that nobody wants to listen to her and she is feeling suicidal. Patient states that she thinks she needs to go back to the hospital and is calling EMS on the other line.     Phone was disconnected. Writer tried to call patient back with no answer.     Writer reached patient and patient stated she called 911 and an ambulance is on the way. There are adults present with her at this time.     A: Suicidal Ideation    R: Patient called 911 and an ambulance is on the way.     WILLIE RUDD RN    Reason for Disposition    [1] Depression symptoms (sadness, hopelessness, decreased energy) AND [2] unable to do any normal activities (e.g., self care, school, work; in comparison to baseline).    Additional Information    Negative: Patient attempted suicide    Negative: Patient is threatening suicide now    Negative: Violent behavior, or threatening to physically hurt or kill someone    Negative: [1] Patient is very confused (disoriented, slurred speech) AND [2] no other adult (e.g., friend or family member) available    Negative: [1] Difficult to awaken or acting very confused (disoriented, slurred speech) AND [2] new-onset    Negative: Sounds like a life-threatening emergency to the triager    Negative: Depression is main symptom and is not threatening suicide    Protocols used: SUICIDE [id number]

## 2022-09-25 NOTE — CONSULTS
[facility] Same Day Brief Assessment      Sadaf Ross was discharged from [place] earlier today and returned to the ED. See the initial consult note from the previous encounter dated 9/24/22 for full assessment details.     Presenting issue that brought initially brought patient to the ED: Suicidal concerns with plan to bite hand    Summary of patient's initial course of treatment: Pt was triaged and assessed for mental health concerns.     Rationale for earlier discharge readiness: From 9/24/22 assessment at 8 PM:     After therapeutic assessment, intervention and aftercare planning by ED care team and [facility] and in consultation with attending provider, the patient's circumstances and mental state were safe for outpatient management. The patient was discharged. Close follow-up with a psychiatrist and/or therapist was recommended and community psychiatric resources were provided. Patient is to return to the ED if any urgent or potentially life-threatening concerns arise.        At the time of discharge, the patient's acute suicide risk was determined to be low due to the following factors: reduction in the intensity of mood/anxiety symptoms that preceded the admission, not currently under the influence of alcohol or illicit substances, denies experiencing command hallucinations and no immediate access to firearms.     Protective factors include: social support, voluntarily seeking mental health support, displays resiliency , established relationship community mental health provider(s), rosy system, future focused thinking, displays insight, expresses desire to engage in treatment and safe/stable housing     Pt reports SI and SIB via biting hand. Pt presents with SI at baseline and there is no current acute change in symptoms or presentation. Pt is current in group home with 24/7 care and monitoring. Pt's symptoms such as depression and SI and SIB are chronic in nature and pt reports no plan or intent to act on SI. Pt  "denies HI.   Pt is recommended to reach out to supports when she is feeling that she needs someone to talk to such as reports listed above. Writer talked through coping skills pt can engage in when symptoms increase. Pt is recommended to continue scheduled appoitments with current providers, and to continue taking medications and prescribed.      Circumstances that led to the patient returning the same day: Pt reports that she was having anxiety after not being able to sleep. Decided to contact EMS and return to [place]. Pt reports her SI had not gone away.     What service engagement or coping methods did the patient attempt before returning to the ED: Pt was recommended to engage with her supports in her group home and utilize care plan established with previous provider. Pt declined engaging in recommendation.     Changes in presentation since initial visit: No changes. Pt presents in same manner.     What are the patient's hopes for this visit: Pt is hoping to be admitted to the hospital. Pt stated, \"I told them if they sent me home I'd just come back tomorrow, I need to be admitted.\"    Current risk to self or others? No    ESS-6  1.a. Over the past 2 weeks, have you had thoughts of killing yourself? Yes   1.b. Have you ever attempted to kill yourself and, if yes, when did this last happen? Yes pt reports she cut her neck 3 years ago with a  knife. No marks on neck.   2. Recent or current suicide plan? Yes ; Pt reports her plan is to bite her hand.   3. Recent or current intent to act on ideation? No  4. Lifetime psychiatric hospitalization? Yes  5. Pattern of excessive substance use? No  6. Current irritability, agitation, or aggression? Yes  ESS-6 Score: 4     Mental Status:     Appearance:   Appropriate    Eye Contact:   Good    Psychomotor Behavior: Normal , Retarded (Slowed)    Attitude:   Uncooperative  Angry    Orientation:   All   Speech    Rate / Production: Normal/ " Responsive    Volume:  Normal    Mood:    Angry  Anxious  Irritable  Agitated   Affect:    Appropriate    Thought Content:  Clear    Thought Form:  Illogical   Insight:    Poor     Additional collateral information: N/a    Disposition: Pt will discharge back to group home.     Rationale for disposition: Pt should continue attempting to utilize plan of care established from previous visit. Pt should follow up with outpatient providers and utilize group home staff as supports as needed.  recommended a day treatment referral, but pt refused and states she will do that on her own.     Duration of face to face time with patient in minutes: .50 hrs    Reviewed assessment with attending provider: Dr. Browning    Total time spent on assessment: 1.0 hrs     CPT code: 05481      CHERI Guajardo     Aftercare Plan  If I am feeling unsafe or I am in a crisis, I will:   Contact my established care providers   Call the National Suicide Prevention Lifeline: 988  Go to the nearest emergency room   Call 911      Warning signs that I or other people might notice when a crisis is developing for me: I start become panicky, I start to look around me seeing if people are following me, I start pacing around, and I stop reaching out to my supports.     Things I am able to do on my own to cope or help me feel better: Listen to music, go for walks, rest, deep breaths, and grounding exercises.  Grounding Techniques:    Try to notice where you are, your surroundings including the people, the sounds like the TV or radio.    Concentrate on your breathing. Take a deep cleansing breath from your diaphragm. Count the breaths as you exhale. Make sure you breath slowly.    Hold something that you find comforting, for some it may be a stuffed animal or a blanket. Notice how it feels in your hands. Is it hard or soft?    During a non-crisis time make a list of positive affirmations. Print them out and keep them handy for times of intense  anxiety. At those times, read them aloud.  Try the OneBuild game:    Name 5 things you can see in the room with you    Name 4 things you can feel ( chair on my back  or  feet on floor )     Name 3 things you can hear right now ( people talking  or  tv )     Name 2 things you can smell right now (or, 2 things you like the smell of)     Name 1 good thing about yourself  Create A Safe Place    Image a safe place -- it can be a real or imaginary place:     What do you see -- especially colors?     What sounds do you hear?     What sensations do you feel?     What smells do you smell?     What people or animals would you want in your safe place?     Imagine a protective bubble, wall or boundary around your safe place.     Imagine a door or gate with a guard at your safe place.     Image a lock and key to your safe place and only you can unlock it.    You can draw or make a collage that represents your safe place.     Choose a souvenir of your safe place -- a color, an object, a song.     Keep your image of your safe place so you can come back to it when you need to.     Reduce Extreme Emotion  QUICKLY:  Changing Your Body Chemistry      T:  Change your body Temperature to change your autonomic nervous system     Use Ice Water to calm yourself down FAST     Put your face in a bowl of ice water (this is the best way; have the person keep his/her face in ice water for 30-45 seconds - initial research is showing that the longer s/he can hold her/his face in the water, the better the response), or     Splash ice water on your face, or hold an ice pack on your face      I:  Intensely exercise to calm down a body revved up by emotion     Examples: running, walking fast, jumping, playing basketball, weight lifting, swimming, calisthenics, etc.     Engage in exercises that DO NOT include violent behaviors. Exercises that utilize violent behaviors tend to function as  behavioral rehearsal,  and rather than calming the person down,  may actually  rev  the person up more, increasing the likelihood of violence, and lessening the likelihood that they will  burn off  energy     P:  Progressively relax your muscles     Starting with your hands, moving to your forearms, upper arms, shoulders, neck, forehead, eyes, cheeks and lips, tongue and teeth, chest, upper back, stomach, buttocks, thighs, calves, ankles, feet     Tense (10 seconds,   of the way), then relax each muscle (all the way)     Notice the tension     Notice the difference when relaxed (by tensing first, and then relaxing, you are able to get a more thorough relaxation than by simply relaxing)      P: Paced breathing to relax     The standard technique is to begin with counting the number of steps one takes for a typical inhale, then counting the steps one takes for a typical exhale, and then lengthening the amount of steps for the exhalation by one or two steps.  OR    Repeat this pattern for 1-2 minutes    Inhale for four (4) seconds     Exhale for six (6) to eight (8) seconds     Research demonstrated that one can change one's overall level of anxiety by doing this exercise for even a few minutes per day         Things that I am able to do with others to cope or help me better: Listen to music, talk with crisis line, or talk it out with my supports.     Things I can use or do for distraction: Music, walks, and rest.     Changes I can make to support my mental health and wellness: Seek out my supports and talk with them when I need to feel heard and understood.      People in my life that I can ask for help: People I trust such as my grandmas, uncle, staff members at my group home, and Ruby at group [place].      Your [facility] has a mental health crisis team you can call 24/7: [facility]  [phone]      Other things that are important when I'm in crisis: That people are here to support me.      Additional resources and information:      Crisis  "Lines  Crisis Text Line  Text 079825  You will be connected with a trained live crisis counselor to provide support.     Por major, sangeetao  SIRENA a 629770 o texto a 442-AYUDAME en Whatpp     The [employer] (LGBTQ Youth Crisis Line)  1.451.662.3770  text START to 944-011        B5M.[employer]  Fast Tracker  Linking people to mental health and substance use disorder resources  Xtellus.Rezzie      [place] Mental Health Warm Line  Peer to peer support  Monday thru Saturday, 12 pm to 10 pm  649.493.0783 or 2.722.470.4063  Text \"Support\" to 96796     National [place] [employer] Mental Illness (MAGY)  778.795.7184 or 1.888.MAGY.HELPS        Mental Health Apps  My3  https://Quolaw.org/     VirtualHopeBox  https://[employer]/apps/virtual-hope-box/        Additional Information  Today you were seen by a licensed mental health professional through Triage and Transition services, Behavioral Healthcare Providers ([facility])  for a crisis assessment in the Emergency Department at [facility].  It is recommended that you follow up with your established providers (psychiatrist, mental health therapist, and/or primary care doctor - as relevant) as soon as possible. Coordinators from [facility] will be calling you in the next 24-48 hours to ensure that you have the resources you need.  You can also contact [facility] coordinators directly at [phone]. You may have been scheduled for or offered an appointment with a mental health provider. [facility] maintains an extensive network of licensed behavioral health providers to connect patients with the services they need.  We do not charge providers a fee to participate in our referral network.  We match patients with providers based on a patient's specific needs, insurance coverage, and location.  Our first effort will be to refer you to a provider within your care system, and will utilize providers outside your care system as needed.           "

## 2022-09-25 NOTE — ED PROVIDER NOTES
ED Provider Note  [facility]      History     Chief Complaint   Patient presents with     Suicidal     HPI  Sadaf Ross is a 21 year old female  with history of intellectual disability, borderline personality disorder, bipolar 1 disorder, who is well-known to the ED d/t her frequent ED visits presenting to the ED via EMS for suicidal ideations. Patient was seen in the ED both last night and again this morning for suicidal ideations and saw DEC during both visits who determined her to be safe to discharge back to  for outpatient management. Of note, per review of chart patient has called the nurse triage line x2 since discharge looking for the number to Patient Relations because she was not satisfied with her care this morning.     The patient says she doesn't feel like anyone listened to her last night and early this am.  She says her dad  in February almost 2 years ago and she misses him.  She knows he is in heaven but it affects her.  She has coping skills such as listening to music but it gets hard.  She feels coming to the hospital is using her resources and she gets mad when they send her home. She lives with 2 other roommates at her group home.  She is the youngest.  She likes them.  She has 2 staff always at the group home and she likes the staff.  She agrees it is like a family.     Past Medical History  Past Medical History:   Diagnosis Date     ADHD (attention deficit hyperactivity disorder)      Bipolar 1 disorder (H)      Borderline personality disorder (H)      Depression      Depressive disorder      Intellectual disability      Obesity      Syncope      Past Surgical History:   Procedure Laterality Date     APPENDECTOMY       APPENDECTOMY       acetaminophen (TYLENOL) 325 MG tablet  alum & mag hydroxide-simethicone (MAALOX  ES) 400-400-40 MG/5ML SUSP suspension  ARIPiprazole lauroxil ER (ARISTADA) 882 MG/3.2ML intra-muscular  benztropine (COGENTIN) 1 MG  tablet  calcium carbonate (TUMS) 500 MG chewable tablet  chlorhexidine (PERIDEX) 0.12 % solution  clobetasol (TEMOVATE) 0.05 % CREA cream  cyclobenzaprine (FLEXERIL) 10 MG tablet  diclofenac (VOLTAREN) 1 % topical gel  docusate sodium (COLACE) 50 MG capsule  fluticasone (FLONASE) 50 MCG/ACT nasal spray  guaiFENesin (ROBITUSSIN) 100 MG/5ML SYRP  hydrocortisone (CORTAID) 0.5 % external cream  hydrOXYzine (ATARAX) 50 MG tablet  hyoscyamine (LEVSIN/SL) 0.125 MG sublingual tablet  ibuprofen (ADVIL/MOTRIN) 400 MG tablet  loperamide (IMODIUM) 2 MG capsule  LORazepam (ATIVAN) 0.5 MG tablet  metoclopramide (REGLAN) 10 MG tablet  multivitamin, therapeutic (THERA-VIT) TABS tablet  omeprazole (PRILOSEC) 40 MG DR capsule  ondansetron (ZOFRAN) 4 MG tablet  polyethylene glycol (MIRALAX) 17 GM/Dose powder  prochlorperazine (COMPAZINE) 10 MG tablet  promethazine (PHENERGAN) 12.5 MG tablet  sennosides (SENOKOT) 8.6 MG tablet  traZODone (DESYREL) 50 MG tablet  venlafaxine (EFFEXOR-ER) 225 MG 24 hr tablet  Vitamin D, Cholecalciferol, 25 MCG (1000 UT) TABS      Allergies   Allergen Reactions     Penicillins Rash and Unknown     Family History  Family History   Problem Relation Age of Onset     Diabetes Type 1 Father      Cancer Paternal Grandfather      Social History   Social History     Tobacco Use     Smoking status: Every Day     Packs/day: 0.50     Years: 5.00     Pack years: 2.50     Types: Vaping Device, Cigarettes     Smokeless tobacco: Never   Substance Use Topics     Alcohol use: No     Drug use: No      Past medical history, past surgical history, medications, allergies, family history, and social history were reviewed with the patient. No additional pertinent items.       Review of Systems   Psychiatric/Behavioral: Positive for dysphoric mood and suicidal ideas. The patient is nervous/anxious.    All other systems reviewed and are negative.    A complete review of systems was performed with pertinent positives and negatives  noted in the HPI, and all other systems negative.    Physical Exam   BP: 125/80  Pulse: 78  Temp: 98.6  F (37  C)  Resp: 12  SpO2: 97 %  Physical Exam  Vitals and nursing note reviewed.   Constitutional:       General: She is not in acute distress.     Appearance: She is obese. She is not ill-appearing, toxic-appearing or diaphoretic.   HENT:      Head: Normocephalic and atraumatic.      Nose: No congestion or rhinorrhea.      Mouth/Throat:      Pharynx: No posterior oropharyngeal erythema.   Cardiovascular:      Rate and Rhythm: Normal rate.   Pulmonary:      Effort: Pulmonary effort is normal.   Musculoskeletal:         General: No signs of injury. Normal range of motion.      Cervical back: Normal range of motion.   Skin:     General: Skin is warm and dry.   Neurological:      General: No focal deficit present.      Mental Status: She is alert and oriented to person, place, and time.   Psychiatric:         Attention and Perception: Attention and perception normal.         Mood and Affect: Mood is anxious and depressed.         Speech: Speech normal.         Behavior: Behavior normal. Behavior is cooperative.         Thought Content: Thought content normal.         Cognition and Memory: Cognition is impaired.         Judgment: Judgment is impulsive and inappropriate.         ED Course      Procedures       The medical record was reviewed and interpreted.  Mental Health Risk Assessment      PSS-3    Date and Time Over the past 2 weeks have you felt down, depressed, or hopeless? Over the past 2 weeks have you had thoughts of killing yourself? Have you ever attempted to kill yourself? When did this last happen? User   09/25/22 1003 yes yes yes more than 6 months ago       C-SSRS ([place])    Date and Time Q1 Wished to be Dead (Past Month) Q2 Suicidal Thoughts (Past Month) Q3 Suicidal Thought Method Q4 Suicidal Intent without Specific Plan Q5 Suicide Intent with Specific Plan Q6 Suicide Behavior (Lifetime) Within  the Past 3 Months? RETIRED: Level of Risk per Screen Screening Not Complete User   09/25/22 1003 -- yes yes no yes yes -- -- -- LK                Item Assessment   Suicidal Ideation None   Plan none   Intent none   Suicidal or self-harm behaviors none   Risk Factors Previous psychiatric diagnosis and treatments   Protective Factors roommates and group home staff              Results for orders placed or performed during the hospital encounter of 09/25/22   UA with Microscopic reflex to Culture     Status: Abnormal    Specimen: Urine, Clean Catch   Result Value Ref Range    Color Urine Light Yellow Colorless, Straw, Light Yellow, Yellow    Appearance Urine Clear Clear    Glucose Urine Negative Negative mg/dL    Bilirubin Urine Negative Negative    Ketones Urine Negative Negative mg/dL    Specific Gravity Urine 1.018 1.003 - 1.035    Blood Urine Negative Negative    pH Urine 5.5 5.0 - 7.0    Protein Albumin Urine Negative Negative mg/dL    Urobilinogen Urine Normal Normal, 2.0 mg/dL    Nitrite Urine Negative Negative    Leukocyte Esterase Urine Negative Negative    Mucus Urine Present (A) None Seen /LPF    RBC Urine 1 <=2 /HPF    WBC Urine 3 <=5 /HPF    Squamous Epithelials Urine 4 (H) <=1 /HPF    Narrative    Urine Culture not indicated   HCG qualitative urine     Status: Normal   Result Value Ref Range    hCG Urine Qualitative Negative Negative     Medications - No data to display     Assessments & Plan (with Medical Decision Making)   Sadaf Ross is a 21 year old female  with history of intellectual disability, borderline personality disorder, bipolar 1 disorder, who is well-known to the ED d/t her frequent ED visits presenting to the ED via EMS for suicidal ideations. She feels that coming to the ED is using her resources.  She is feeling better now and feels that she can go back to her group home. She lives her roommates and staff there.  She is at baseline.  Her group home is comfortable with her  returning.  She was discharged back to her group home via safe transport.     I have reviewed the nursing notes. I have reviewed the findings, diagnosis, plan and need for follow up with the patient.    Discharge Medication List as of 9/25/2022 10:55 AM          Final diagnoses:   Borderline personality disorder (H)   Intellectual disability       --  Ashely Toth MD  [facility] EMERGENCY DEPARTMENT  9/25/2022     Ashely Toth MD  10/09/22 0082

## 2022-09-25 NOTE — ED NOTES
Bed: HW05  Expected date: 9/25/22  Expected time: 2:59 AM  Means of arrival:   Comments:  N712  21 F  Anxiety

## 2022-09-25 NOTE — ED TRIAGE NOTES
"Patient comes in today via EMS reporting suicidal thoughts. She states that she wants to inflict self-harm by \"biting herself\".      Triage Assessment     Row Name 09/25/22 1002       Triage Assessment (Adult)    Airway WDL WDL       Respiratory WDL    Respiratory WDL WDL       Skin Circulation/Temperature WDL    Skin Circulation/Temperature WDL WDL       Cardiac WDL    Cardiac WDL WDL       Peripheral/Neurovascular WDL    Peripheral Neurovascular WDL WDL       Cognitive/Neuro/Behavioral WDL    Cognitive/Neuro/Behavioral WDL WDL              "

## 2022-09-25 NOTE — ED TRIAGE NOTES
Brought in by EMS, pt having SI, thoughts of biting herself.      Triage Assessment     Row Name 09/25/22 0307       Respiratory WDL    Respiratory WDL WDL       Skin Circulation/Temperature WDL    Skin Circulation/Temperature WDL WDL       Cardiac WDL    Cardiac WDL WDL       Peripheral/Neurovascular WDL    Peripheral Neurovascular WDL WDL       Cognitive/Neuro/Behavioral WDL    Cognitive/Neuro/Behavioral WDL WDL

## 2022-09-25 NOTE — TELEPHONE ENCOUNTER
Nurse Triage    Is this a 2nd Level Triage? NO    Situation: Patient calling upset about the care she received yesterday in the ED and for depression. Pt was provided the phone number for patient relations so that she can follow up regarding the concerns about care received in the ED.     Background: Pt states she's had depression and suicidal thoughts since her father  about 2 years ago. She's reports being to the hospital multiple times, but doesn't get the care she needs.     Assessment:  Pt is not actively threatening suicide, but she states she's depressed and unable to carryout normal activities. Pt is oriented. Pt was advised to go to the ED now. Pt states that she will go in the AM and plans to call the crisis line in the meantime.     Protocol Recommended Disposition:   Go to ED Now    Recommendation: Advised patient to Go to ED now. Care advice given. Reviewed concerning symptoms and when to call back.     Sonam Campos RN [facility] 2022 2:36 AM    Reason for Disposition    [1] Depression symptoms (sadness, hopelessness, decreased energy) AND [2] unable to do any normal activities (e.g., self care, school, work; in comparison to baseline).    Additional Information    Negative: Patient attempted suicide    Negative: Patient is threatening suicide now    Negative: Violent behavior, or threatening to physically hurt or kill someone    Negative: [1] Patient is very confused (disoriented, slurred speech) AND [2] no other adult (e.g., friend or family member) available    Negative: [1] Difficult to awaken or acting very confused (disoriented, slurred speech) AND [2] new-onset    Negative: Sounds like a life-threatening emergency to the triager    Negative: Depression is main symptom and is not threatening suicide    Protocols used: SUICIDE [id number]

## 2022-09-25 NOTE — ED NOTES
Bed: HW07UR  Expected date: 9/25/22  Expected time: 6:20 PM  Means of arrival: Ambulance  Comments:  [address] 22yo female, mental health

## 2022-09-25 NOTE — ED NOTES
"During triage pt stated \"I told them if they sent me home I'd just come back tomorrow, I need to be admitted.\"  "

## 2022-09-25 NOTE — DISCHARGE INSTRUCTIONS
Return to your group home and continue to use your coping skills with the support of staff there.     Continue to work with your current providers.     Take your medications as prescribed.

## 2022-09-25 NOTE — ED PROVIDER NOTES
[facility] EMERGENCY DEPARTMENT ([facility])  9/25/22    History     Chief Complaint   Patient presents with     Suicidal     HPI  Sadaf Ross is a 21 year old female with PMH significant for intellectual disability, borderline personality disorder, bipolar 1 disorder, who is well-known to the ED d/t her frequent ED visits who resents to the ED for evaluation of suicidal ideation.  Patient has been seen in the ED twice today as well as yesterday for suicidal ideation.  Patient was evaluated by DEC during those visits who determined her to be safe and the patient was able to be discharged back to [facility] for outpatient management.      Past Medical History  Past Medical History:   Diagnosis Date     ADHD (attention deficit hyperactivity disorder)      Bipolar 1 disorder (H)      Borderline personality disorder (H)      Depression      Depressive disorder      Intellectual disability      Obesity      Syncope      Past Surgical History:   Procedure Laterality Date     APPENDECTOMY       APPENDECTOMY       acetaminophen (TYLENOL) 325 MG tablet  alum & mag hydroxide-simethicone (MAALOX  ES) 400-400-40 MG/5ML SUSP suspension  ARIPiprazole lauroxil ER (ARISTADA) 882 MG/3.2ML intra-muscular  benztropine (COGENTIN) 1 MG tablet  calcium carbonate (TUMS) 500 MG chewable tablet  chlorhexidine (PERIDEX) 0.12 % solution  clobetasol (TEMOVATE) 0.05 % CREA cream  cyclobenzaprine (FLEXERIL) 10 MG tablet  diclofenac (VOLTAREN) 1 % topical gel  docusate sodium (COLACE) 50 MG capsule  fluticasone (FLONASE) 50 MCG/ACT nasal spray  guaiFENesin (ROBITUSSIN) 100 MG/5ML SYRP  hydrocortisone (CORTAID) 0.5 % external cream  hydrOXYzine (ATARAX) 50 MG tablet  hyoscyamine (LEVSIN/SL) 0.125 MG sublingual tablet  ibuprofen (ADVIL/MOTRIN) 400 MG tablet  loperamide (IMODIUM) 2 MG capsule  LORazepam (ATIVAN) 0.5 MG tablet  metoclopramide (REGLAN) 10 MG tablet  multivitamin, therapeutic (THERA-VIT) TABS tablet  omeprazole  "(PRILOSEC) 40 MG DR capsule  ondansetron (ZOFRAN) 4 MG tablet  polyethylene glycol (MIRALAX) 17 GM/Dose powder  prochlorperazine (COMPAZINE) 10 MG tablet  promethazine (PHENERGAN) 12.5 MG tablet  sennosides (SENOKOT) 8.6 MG tablet  traZODone (DESYREL) 50 MG tablet  venlafaxine (EFFEXOR-ER) 225 MG 24 hr tablet  Vitamin D, Cholecalciferol, 25 MCG (1000 UT) TABS      Allergies   Allergen Reactions     Penicillins Rash and Unknown     Family History  Family History   Problem Relation Age of Onset     Diabetes Type 1 Father      Cancer Paternal Grandfather      Social History   Social History     Tobacco Use     Smoking status: Current Every Day Smoker     Packs/day: 0.50     Years: 5.00     Pack years: 2.50     Types: Vaping Device     Smokeless tobacco: Never Used   Substance Use Topics     Alcohol use: No     Drug use: No      Past medical history, past surgical history, medications, allergies, family history, and social history were reviewed with the patient. No additional pertinent items.       Review of Systems   Constitutional: Negative for fever.   Respiratory: Negative for shortness of breath.    Cardiovascular: Negative for chest pain.   Gastrointestinal: Negative for abdominal pain.   All other systems reviewed and are negative.    A complete review of systems was performed with pertinent positives and negatives noted in the HPI, and all other systems negative.    Physical Exam   BP: 121/77  Pulse: 79  Temp: 99.2  F (37.3  C)  Resp: 16  Height: 162.6 cm (5' 4\")  Weight: 106.1 kg (234 lb)  SpO2: 98 %  Physical Exam  Vitals and nursing note reviewed.   Constitutional:       General: She is not in acute distress.     Appearance: Normal appearance. She is not diaphoretic.   HENT:      Head: Atraumatic.      Mouth/Throat:      Pharynx: No oropharyngeal exudate.   Eyes:      General: No scleral icterus.     Pupils: Pupils are equal, round, and reactive to light.   Cardiovascular:      Rate and Rhythm: Normal rate " and regular rhythm.      Heart sounds: Normal heart sounds.   Pulmonary:      Effort: No respiratory distress.      Breath sounds: Normal breath sounds.   Abdominal:      General: Bowel sounds are normal.      Palpations: Abdomen is soft.      Tenderness: There is no abdominal tenderness.   Musculoskeletal:         General: No tenderness.   Skin:     General: Skin is warm.      Findings: No rash.   Neurological:      General: No focal deficit present.      Mental Status: She is alert and oriented to person, place, and time.           ED Course      Procedures     Mental Health Risk Assessment      PSS-3    Date and Time Over the past 2 weeks have you felt down, depressed, or hopeless? Over the past 2 weeks have you had thoughts of killing yourself? Have you ever attempted to kill yourself? When did this last happen? User   09/25/22 1828 yes yes yes more than 6 months ago TJM      C-SSRS ([place])    Date and Time Q1 Wished to be Dead (Past Month) Q2 Suicidal Thoughts (Past Month) Q3 Suicidal Thought Method Q4 Suicidal Intent without Specific Plan Q5 Suicide Intent with Specific Plan Q6 Suicide Behavior (Lifetime) Within the Past 3 Months? RETIRED: Level of Risk per Screen Screening Not Complete User   09/25/22 1828 yes yes no no no yes -- -- -- TJM              Suicide assessment completed by mental health (D.E.C., LCSW, etc.)       Results for orders placed or performed during the hospital encounter of 09/25/22   UA with Microscopic reflex to Culture     Status: Abnormal    Specimen: Urine, Clean Catch   Result Value Ref Range    Color Urine Light Yellow Colorless, Straw, Light Yellow, Yellow    Appearance Urine Clear Clear    Glucose Urine Negative Negative mg/dL    Bilirubin Urine Negative Negative    Ketones Urine Negative Negative mg/dL    Specific Gravity Urine 1.018 1.003 - 1.035    Blood Urine Negative Negative    pH Urine 5.5 5.0 - 7.0    Protein Albumin Urine Negative Negative mg/dL    Urobilinogen Urine  Normal Normal, 2.0 mg/dL    Nitrite Urine Negative Negative    Leukocyte Esterase Urine Negative Negative    Mucus Urine Present (A) None Seen /LPF    RBC Urine 1 <=2 /HPF    WBC Urine 3 <=5 /HPF    Squamous Epithelials Urine 4 (H) <=1 /HPF    Narrative    Urine Culture not indicated   HCG qualitative urine     Status: Normal   Result Value Ref Range    hCG Urine Qualitative Negative Negative     Medications   alum & mag hydroxide-simethicone (MAALOX) suspension 30 mL (30 mLs Oral Given 9/25/22 2003)   acetaminophen (TYLENOL) tablet 500 mg (500 mg Oral Given 9/25/22 2004)        Assessments & Plan (with Medical Decision Making)     21 year old female with PMH significant for intellectual disability, borderline personality disorder, bipolar 1 disorder, who is well-known to the ED d/t her frequent ED visits who resents to the ED for evaluation of suicidal ideation.  Patient was stable vital signs and afebrile including normal pulse ox at 98% on room air.  Patient seen in coordination with behavioral crisis , please refer to their note for further details.  Plan for discharge back to group home and continuation of engagement with community services.    I have reviewed the nursing notes. I have reviewed the findings, diagnosis, plan and need for follow up with the patient.    New Prescriptions    No medications on file       Final diagnoses:   Depression, unspecified depression type       --  Becky Duggan MD  [facility] EMERGENCY DEPARTMENT  9/25/2022     Becky Duggan MD  10/01/22 4991

## 2022-09-25 NOTE — ED PROVIDER NOTES
ED Provider Note  [facility]      History     Chief Complaint   Patient presents with     Suicidal     Suicidal, wants to bite herself to hurt herself. No specific plan.     HPI  Sadaf Ross is a 21 year old female with a PMH of borderline personality disorder, bipolar 1, intellectual disability, ADHD, depression, homicidal ideation and suicidal ideation who presents to the ED today reporting suicidal ideation.  Patient states she has been seen in the emergency department for similar symptoms several times over the past week.  She currently lives in a group home where she has one-to-one supervision.  Reports that she has ongoing suicidal ideation stating that she has been wanting to bite her hands.  Denies any medication ingestion or attempt at this time.  She has an abundance of outpatient resources for this.  Denies any fevers, chills, chest pain, trouble breathing or any other physical symptoms at this time.  Does have ongoing hallucinations.  Reports she has been taking her medications as prescribed.  Feels safe at her group home and has good support there.    Past Medical History  Past Medical History:   Diagnosis Date     ADHD (attention deficit hyperactivity disorder)      Bipolar 1 disorder (H)      Borderline personality disorder (H)      Depression      Depressive disorder      Intellectual disability      Obesity      Syncope      Past Surgical History:   Procedure Laterality Date     APPENDECTOMY       APPENDECTOMY       acetaminophen (TYLENOL) 325 MG tablet  alum & mag hydroxide-simethicone (MAALOX  ES) 400-400-40 MG/5ML SUSP suspension  ARIPiprazole lauroxil ER (ARISTADA) 882 MG/3.2ML intra-muscular  benztropine (COGENTIN) 1 MG tablet  calcium carbonate (TUMS) 500 MG chewable tablet  chlorhexidine (PERIDEX) 0.12 % solution  clobetasol (TEMOVATE) 0.05 % CREA cream  cyclobenzaprine (FLEXERIL) 10 MG tablet  diclofenac (VOLTAREN) 1 % topical gel  docusate sodium (COLACE)  50 MG capsule  fluticasone (FLONASE) 50 MCG/ACT nasal spray  guaiFENesin (ROBITUSSIN) 100 MG/5ML SYRP  hydrocortisone (CORTAID) 0.5 % external cream  hydrOXYzine (ATARAX) 50 MG tablet  hyoscyamine (LEVSIN/SL) 0.125 MG sublingual tablet  ibuprofen (ADVIL/MOTRIN) 400 MG tablet  loperamide (IMODIUM) 2 MG capsule  LORazepam (ATIVAN) 0.5 MG tablet  metoclopramide (REGLAN) 10 MG tablet  multivitamin, therapeutic (THERA-VIT) TABS tablet  omeprazole (PRILOSEC) 40 MG DR capsule  ondansetron (ZOFRAN) 4 MG tablet  polyethylene glycol (MIRALAX) 17 GM/Dose powder  prochlorperazine (COMPAZINE) 10 MG tablet  promethazine (PHENERGAN) 12.5 MG tablet  sennosides (SENOKOT) 8.6 MG tablet  traZODone (DESYREL) 50 MG tablet  venlafaxine (EFFEXOR-ER) 225 MG 24 hr tablet  Vitamin D, Cholecalciferol, 25 MCG (1000 UT) TABS      Allergies   Allergen Reactions     Penicillins Rash and Unknown     Family History  Family History   Problem Relation Age of Onset     Diabetes Type 1 Father      Cancer Paternal Grandfather      Social History   Social History     Tobacco Use     Smoking status: Current Every Day Smoker     Packs/day: 0.50     Years: 5.00     Pack years: 2.50     Types: Vaping Device     Smokeless tobacco: Never Used   Substance Use Topics     Alcohol use: No     Drug use: No      Past medical history, past surgical history, medications, allergies, family history, and social history were reviewed with the patient. No additional pertinent items.       Review of Systems  A complete review of systems was performed with pertinent positives and negatives noted in the HPI, and all other systems negative.    Physical Exam   BP: 132/82  Pulse: 98  Temp: 98.4  F (36.9  C)  Resp: 12  SpO2: 98 %  Physical Exam  General: No acute distress. Appears stated age.   HENT: MMM, no oropharyngeal lesions  Eyes: PERRL, normal sclerae   Cardio: Regular rate, extremities well perfused  Resp: Normal work of breathing, normal respiratory rate  Neuro: alert  and fully oriented. CN II-XII grossly intact. Grossly normal strength and sensation in all extremities.   MSK: no deformities. Grossly normal ROM in extremities.   Integumentary/Skin: no rash visualized, normal color  Psych: flat affect, cooperative behavior. endorses SI. denies HI. Endorses auditory hallucinations.    ED Course      Procedures       Mental Health Risk Assessment      PSS-3    Date and Time Over the past 2 weeks have you felt down, depressed, or hopeless? Over the past 2 weeks have you had thoughts of killing yourself? Have you ever attempted to kill yourself? When did this last happen? User   09/24/22 1843 yes yes yes -- MEM      C-SSRS ([place])    Date and Time Q1 Wished to be Dead (Past Month) Q2 Suicidal Thoughts (Past Month) Q3 Suicidal Thought Method Q4 Suicidal Intent without Specific Plan Q5 Suicide Intent with Specific Plan Q6 Suicide Behavior (Lifetime) Within the Past 3 Months? RETIRED: Level of Risk per Screen Screening Not Complete User   09/24/22 1843 yes yes yes yes no yes -- -- -- MEM              Suicide assessment completed by mental health (D.E.C., LCSW, etc.)       No results found for any visits on 09/24/22.  Medications - No data to display     Assessments & Plan (with Medical Decision Making)   Sadaf Ross is a 21-year-old female with history of borderline personality disorder, bipolar, intellectual disability, depression and suicidal ideation who presents today for ongoing and chronic suicidal ideation.  She currently lives in a group home where she has one-to-one supervision and a significant amount of support.  She has been here many times over the past week for similar symptoms.  Consulted our mental health 's who performed a mental health assessment as well.  It was felt that patient's suicidal ideation and auditory hallucinations are chronic in nature and no adjustments in her medications are indicated at this time.  She seems to be safe at her group home  facility where she is very closely monitored.  Felt comfortable with discharge home.  Advised to return with any new or worsening symptoms.    I have reviewed the nursing notes. I have reviewed the findings, diagnosis, plan and need for follow up with the patient.    New Prescriptions    No medications on file       Final diagnoses:   Suicidal ideation       --  Kristina Cali MD  [facility] EMERGENCY DEPARTMENT  9/24/2022     Kristina Cali MD  Resident  09/25/22 0037

## 2022-09-25 NOTE — TELEPHONE ENCOUNTER
Telephone call  Patient calling to request the number for [facility] gave her the number for [phone].  Patient did not want anything else.    Nan Saucedo RN   [facility] Nurse Advisor  8:31 AM 9/25/2022

## 2022-09-25 NOTE — DISCHARGE INSTRUCTIONS
You are seen today for suicidal ideation.  It sounds like you have great support at your group home.  Please follow-up with your regular mental health provider.  Return to the emergency department with any new or worsening symptoms    Aftercare Plan  If I am feeling unsafe or I am in a crisis, I will:   Contact my established care providers   Call the National Suicide Prevention Lifeline: 987  Go to the nearest emergency room   Call 911     Warning signs that I or other people might notice when a crisis is developing for me: I start become panicky, I start to look around me seeing if people are following me, I start pacing around, and I stop reaching out to my supports.    Things I am able to do on my own to cope or help me feel better: Listen to music, go for walks, rest, deep breaths, and grounding exercises.  Grounding Techniques:  Try to notice where you are, your surroundings including the people, the sounds like the TV or radio.  Concentrate on your breathing. Take a deep cleansing breath from your diaphragm. Count the breaths as you exhale. Make sure you breath slowly.  Hold something that you find comforting, for some it may be a stuffed animal or a blanket. Notice how it feels in your hands. Is it hard or soft?  During a non-crisis time make a list of positive affirmations. Print them out and keep them handy for times of intense anxiety. At those times, read them aloud.  Try the KnowledgeTree game:  Name 5 things you can see in the room with you  Name 4 things you can feel ( chair on my back  or  feet on floor )   Name 3 things you can hear right now ( people talking  or  tv )   Name 2 things you can smell right now (or, 2 things you like the smell of)   Name 1 good thing about yourself  Create A Safe Place  Image a safe place -- it can be a real or imaginary place:   What do you see -- especially colors?   What sounds do you hear?   What sensations do you feel?   What smells do you smell?   What people or animals  would you want in your safe place?   Imagine a protective bubble, wall or boundary around your safe place.   Imagine a door or gate with a guard at your safe place.   Image a lock and key to your safe place and only you can unlock it.  You can draw or make a collage that represents your safe place.   Choose a souvenir of your safe place -- a color, an object, a song.   Keep your image of your safe place so you can come back to it when you need to.    Reduce Extreme Emotion  QUICKLY:  Changing Your Body Chemistry      T:  Change your body Temperature to change your autonomic nervous system   Use Ice Water to calm yourself down FAST   Put your face in a bowl of ice water (this is the best way; have the person keep his/her face in ice water for 30-45 seconds - initial research is showing that the longer s/he can hold her/his face in the water, the better the response), or   Splash ice water on your face, or hold an ice pack on your face      I:  Intensely exercise to calm down a body revved up by emotion   Examples: running, walking fast, jumping, playing basketball, weight lifting, swimming, calisthenics, etc.   Engage in exercises that DO NOT include violent behaviors. Exercises that utilize violent behaviors tend to function as  behavioral rehearsal,  and rather than calming the person down, may actually  rev  the person up more, increasing the likelihood of violence, and lessening the likelihood that they will  burn off  energy     P:  Progressively relax your muscles   Starting with your hands, moving to your forearms, upper arms, shoulders, neck, forehead, eyes, cheeks and lips, tongue and teeth, chest, upper back, stomach, buttocks, thighs, calves, ankles, feet   Tense (10 seconds,   of the way), then relax each muscle (all the way)   Notice the tension   Notice the difference when relaxed (by tensing first, and then relaxing, you are able to get a more thorough relaxation than by simply relaxing)     P: Paced  "breathing to relax   The standard technique is to begin with counting the number of steps one takes for a typical inhale, then counting the steps one takes for a typical exhale, and then lengthening the amount of steps for the exhalation by one or two steps.  OR  Repeat this pattern for 1-2 minutes  Inhale for four (4) seconds   Exhale for six (6) to eight (8) seconds   Research demonstrated that one can change one's overall level of anxiety by doing this exercise for even a few minutes per day       Things that I am able to do with others to cope or help me better: Listen to music, talk with crisis line, or talk it out with my supports.    Things I can use or do for distraction: Music, walks, and rest.    Changes I can make to support my mental health and wellness: Seek out my supports and talk with them when I need to feel heard and understood.     People in my life that I can ask for help: People I trust such as my grandmas, uncle, staff members at my group home, and Ruby at group home.     Your [facility] has a mental health crisis team you can call 24/7: [facility] Mobile Crisis  [phone]     Other things that are important when I'm in crisis: That people are here to support me.     Additional resources and information:     Crisis Lines  Crisis Text Line  Text 304145  You will be connected with a trained live crisis counselor to provide support.    Por espanol, texto  SIRENA a 507206 o texto a 442-AYUDAME en WhatsApp    The [employer] (LGBTQ Youth Crisis Line)  6.321.966.0569  text START to 800-471      Community Resources  Fast Tracker  Linking people to mental health and substance use disorder resources  fasttrackermn.org     [place] Mental Health Warm Line  Peer to peer support  Monday thru Saturday, 12 pm to 10 pm  953.658.6109 or 3.101.737.6273  Text \"Support\" to 65321    National [place] [employer] Mental Illness (MAGY)  990.579.3907 or 1.888.MAGY.HELPS      Mental Health " Apps  My3  https://Bypass Mobilepp.org/    VirtualHopeBox  https://Cloud Theory/apps/virtual-hope-box/      Additional Information  Today you were seen by a licensed mental health professional through Triage and Transition services, Behavioral Healthcare Providers ([facility])  for a crisis assessment in the Emergency Department at [facility].  It is recommended that you follow up with your established providers (psychiatrist, mental health therapist, and/or primary care doctor - as relevant) as soon as possible. Coordinators from [facility] will be calling you in the next 24-48 hours to ensure that you have the resources you need.  You can also contact [facility] coordinators directly at [phone]. You may have been scheduled for or offered an appointment with a mental health provider. [facility] maintains an extensive network of licensed behavioral health providers to connect patients with the services they need.  We do not charge providers a fee to participate in our referral network.  We match patients with providers based on a patient's specific needs, insurance coverage, and location.  Our first effort will be to refer you to a provider within your care system, and will utilize providers outside your care system as needed.

## 2022-09-25 NOTE — ED NOTES
Bed: HW  Expected date:   Expected time:   Means of arrival:   Comments:  [facility] 21F  TRIAGE

## 2022-09-25 NOTE — TELEPHONE ENCOUNTER
Dropped call before pt was able to tell me what she was calling about. Will attempt to call back.    Kristina Anderson, RN, BSN  [facility]   Triage Nurse Advisor              Reason for Disposition    Unable to complete triage due to phone connection issues    Additional Information    Negative: Caller is angry or rude (e.g., hangs up, verbally abusive, yelling)    Negative: Caller hangs up    Negative: Caller has already spoken with the PCP and has no further questions.    Negative: Caller has already spoken with another triager and has no further questions.    Negative: Caller has already spoken with another triager or PCP AND has further questions AND triager able to answer questions.    Negative: Caller has cancelled the call before the first contact    Negative: Patient already left for the hospital/clinic.    Negative: Pager number given.  Answering service notified.    Negative: Cell phone out of range.  Phone number verified.    Negative: Third attempt to contact caller AND no contact made. Phone number verified.    Negative: Second attempt to contact caller AND no contact made. Phone number verified.    Negative: Wrong number reached.  Phone number verified.    Negative: Message left with person in household.    Negative: Message left on unidentified voice mail.  Phone number verified.    Negative: Message left on identified voice mail    Negative: No answer.  First attempt to contact caller.  Follow-up call scheduled within 15 minutes.    Negative: Busy signal.  First attempt to contact caller.  Follow-up call scheduled within 15 minutes.    Protocols used: NO CONTACT OR DUPLICATE CONTACT CALL-A-

## 2022-09-26 ENCOUNTER — NURSE TRIAGE (OUTPATIENT)
Dept: NURSING | Facility: CLINIC | Age: 22
End: 2022-09-26

## 2022-09-26 ENCOUNTER — HOSPITAL ENCOUNTER (EMERGENCY)
Facility: CLINIC | Age: 22
Discharge: HOME | End: 2022-09-26
Attending: EMERGENCY MEDICINE | Admitting: EMERGENCY MEDICINE
Payer: MEDICARE

## 2022-09-26 VITALS
SYSTOLIC BLOOD PRESSURE: 115 MMHG | RESPIRATION RATE: 16 BRPM | DIASTOLIC BLOOD PRESSURE: 81 MMHG | OXYGEN SATURATION: 100 % | HEART RATE: 79 BPM | TEMPERATURE: 98.5 F

## 2022-09-26 DIAGNOSIS — R45.851 SUICIDAL IDEATION: ICD-10-CM

## 2022-09-26 DIAGNOSIS — F60.3 BORDERLINE PERSONALITY DISORDER (H): ICD-10-CM

## 2022-09-26 PROCEDURE — 250N000013 HC RX MED GY IP 250 OP 250 PS 637: Performed by: EMERGENCY MEDICINE

## 2022-09-26 PROCEDURE — 99285 EMERGENCY DEPT VISIT HI MDM: CPT | Performed by: EMERGENCY MEDICINE

## 2022-09-26 PROCEDURE — 250N000011 HC RX IP 250 OP 636: Performed by: EMERGENCY MEDICINE

## 2022-09-26 PROCEDURE — 96372 THER/PROPH/DIAG INJ SC/IM: CPT | Performed by: EMERGENCY MEDICINE

## 2022-09-26 PROCEDURE — 99283 EMERGENCY DEPT VISIT LOW MDM: CPT | Performed by: EMERGENCY MEDICINE

## 2022-09-26 RX ORDER — OLANZAPINE 10 MG/2ML
10 INJECTION, POWDER, FOR SOLUTION INTRAMUSCULAR ONCE
Status: COMPLETED | OUTPATIENT
Start: 2022-09-26 | End: 2022-09-26

## 2022-09-26 RX ORDER — LORAZEPAM 1 MG/1
2 TABLET ORAL ONCE
Status: COMPLETED | OUTPATIENT
Start: 2022-09-26 | End: 2022-09-26

## 2022-09-26 RX ADMIN — OLANZAPINE 10 MG: 10 INJECTION, POWDER, FOR SOLUTION INTRAMUSCULAR at 10:02

## 2022-09-26 RX ADMIN — LORAZEPAM 2 MG: 1 TABLET ORAL at 09:34

## 2022-09-26 ASSESSMENT — ENCOUNTER SYMPTOMS
AGITATION: 1
BACK PAIN: 0
CHILLS: 0
VOMITING: 0
FEVER: 0
NERVOUS/ANXIOUS: 1
ABDOMINAL PAIN: 0
NAUSEA: 0
HEADACHES: 0
DIFFICULTY URINATING: 0
SHORTNESS OF BREATH: 0
WOUND: 0
NERVOUS/ANXIOUS: 0
NECK PAIN: 0

## 2022-09-26 NOTE — TELEPHONE ENCOUNTER
"Pt called stating she is at home, out in the  garage smoking and all of sudden dead people appeared, and the devil is getting in to my body again as always the same thing. Pt states she doesn't want to but no games to people and if she comes back you guys are gone hear for me, and not afraid to go to snf. Pt stated \"if she comes back in she will start hitting people\". Pt states a lot of people are going through what she is going through, and something has to be done. Pt is threating suicide and denied having any plan says she \"just have though\".      Per promise pt was advised to call 911,and said she deosnt want to call the comes and if she goes in they will send her right back and wanted to know what they are going to do there for her. RN educated pt that the importance of calling 911 in oder to be taken back to the ER to get the help she needs. Pt agreed and said okay. Pt  deosnt want RN to call 911 instead said she can call 911. Pt stated when she come back to the ER, they better listen to me  because if they deosnt that is what makes me angry.    RN called the emergency depart at [facility] pt was seen earlier and spoke to charge nurse informed him that pt is threating she will start hitting people when she comes to the ER, and he stated okay.     Hayder Chun,EZEQUIEL  [facility] Nurse Advisors     COVID 19 Nurse Triage Plan/Patient Instructions    Please be aware that novel coronavirus (COVID-19) may be circulating in the community. If you develop symptoms such as fever, cough, or SOB or if you have concerns about the presence of another infection including coronavirus (COVID-19), please contact your health care provider or visit [email or web address].     Disposition/Instructions    Call to EMS/911 recommended. Follow protocol based instructions.     Bring Your Own Device:  Please also bring your smart device(s) (smart phones, tablets, laptops) and their charging cables for your personal use and " to communicate with your care team during your visit.    Thank you for taking steps to prevent the spread of this virus.  o Limit your contact with others.  o Wear a simple mask to cover your cough.  o Wash your hands well and often.    Resources    [facility]: About COVID-19: [email or web address]    CDC: What to Do If You're Sick: www.cdc.gov/coronavirus/2019-ncov/about/steps-when-sick.html    CDC: Ending Home Isolation: www.cdc.gov/coronavirus/2019-ncov/hcp/disposition-in-home-patients.html     CDC: Caring for Someone: www.cdc.gov/coronavirus/2019-ncov/if-you-are-sick/care-for-someone.html     [facility]: Interim Guidance for Hospital Discharge to Home: www.health.[facility].mn./diseases/coronavirus/hcp/hospdischarge.pdf    [facility] clinical trials (COVID-19 research studies): [email or web address].[facility]-clinical-trials     Below are the COVID-19 hotlines at the [place] Department of Health ([facility]). Interpreters are available.   o For health questions: Call 449-160-5383 or 1-130.778.1106 (7 a.m. to 7 p.m.)  o For questions about schools and childcare: Call 469-155-4055 or 1-192.867.3484 (7 a.m. to 7 p.m.)                                 Reason for Disposition    Patient is threatening suicide now    Additional Information    Negative: Patient attempted suicide    Protocols used: SUICIDE [id number]

## 2022-09-26 NOTE — CONSULTS
[facility] Same Day Brief Assessment        Sadaf Ross was discharged from [place] earlier today and returned to the ED. See the initial consult note from the previous encounter dated 9/24/22 for full assessment details.  She has been in the ED 4 times in the last 24 hours.      Presenting issue that brought initially brought patient to the ED: Suicidal concerns with plan to bite hand     Summary of patient's initial course of treatment: Pt was triaged and assessed for mental health concerns.  She has seen the mental health  3 of the 4 visits in the last 24 hours.      Rationale for earlier discharge readiness: From 9/24/22 assessment at 8 PM:      After therapeutic assessment, intervention and aftercare planning by ED care team and [facility] and in consultation with attending provider, the patient's circumstances and mental state were safe for outpatient management. The patient was discharged. Close follow-up with a psychiatrist and/or therapist was recommended and community psychiatric resources were provided. Patient is to return to the ED if any urgent or potentially life-threatening concerns arise.        At the time of discharge, the patient's acute suicide risk was determined to be low due to the following factors: reduction in the intensity of mood/anxiety symptoms that preceded the admission, not currently under the influence of alcohol or illicit substances, denies experiencing command hallucinations and no immediate access to firearms.      Protective factors include: social support, voluntarily seeking mental health support, displays resiliency , established relationship community mental health provider(s), rosy system, future focused thinking, displays insight, expresses desire to engage in treatment and safe/stable housing      Pt reports SI and SIB via biting hand. Pt presents with SI at baseline and there is no current acute change in symptoms or presentation. Pt is current in group home with  24/7 care and monitoring. Pt's symptoms such as depression and SI and SIB are chronic in nature and pt reports no plan or intent to act on SI. Pt denies HI.   Pt is recommended to reach out to supports when she is feeling that she needs someone to talk to such as reports listed above. Writer talked through coping skills pt can engage in when symptoms increase. Pt is recommended to continue scheduled appoitments with current providers, and to continue taking medications and prescribed.       Circumstances that led to the patient returning the same day: Pt states that when she got home from the ED a few hours ago she was tired and took a nap. When she woke up she was having suicidal thoughts and called a nurse care line who summoned 911 to the home.  Now that pt is in the ED she says she wants to go home.      What service engagement or coping methods did the patient attempt before returning to the ED: Pt states that she listened to music as a coping skill.      Changes in presentation since initial visit: No changes. Pt presents in same manner.      What are the patient's hopes for this visit: Pt does not say why she returned.  Now that she is here she wants to go home.      Current risk to self or others? No. She does not endorse a plan or intentions for suicide.     Phone call to group home staff. Arlene [phone].  She states that when pt returned home from the ED late this afternoon she had said the ED fed her well, that she was tired, going to take and nap and then come back to the ED.  She did not talk to staff when she awoke and the next thing they knew the police had arrived.  She states that she will arrange a team meeting in the near future to address pts frequent use of the ED.      ESS-6  1.a. Over the past 2 weeks, have you had thoughts of killing yourself? Yes   1.b. Have you ever attempted to kill yourself and, if yes, when did this last happen? Yes pt reports she cut her neck 3 years ago with a   knife. No marks on neck.   2. Recent or current suicide plan? Yes ; Pt reports her plan is to bite her hand.   3. Recent or current intent to act on ideation? No  4. Lifetime psychiatric hospitalization? Yes  5. Pattern of excessive substance use? No  6. Current irritability, agitation, or aggression? Yes  ESS-6 Score: 4                Mental Status:                 Appearance:                            Appropriate               Eye Contact:                           Good               Psychomotor Behavior:          Normal , Retarded (Slowed)               Attitude:                                   cooperative. Engaged in assessment.               Orientation:                             All              Speech                          Rate / Production:       Normal/ Responsive                          Volume:                       Normal               Mood:                                      within normal              Affect:                                      Appropriate               Thought Content:                    Clear               Thought Form:                        intact               Insight:                                     Poor      Additional collateral information: N/a     Disposition: Pt will discharge back to group home.      Rationale for disposition: Pt should continue attempting to utilize plan of care established from previous visit. Pt should follow up with outpatient providers and utilize group home staff as supports as needed.      Duration of face to face time with patient in minutes: 15 minutes     Reviewed assessment with attending provider: Dr. Duggan     Total time spent on assessment: 1.0 hrs        CPT code: 27182

## 2022-09-26 NOTE — TELEPHONE ENCOUNTER
Spoke with patient, who was currently on the phone arranging for transport to the hospital.    Kristina Anderson RN, BSN  [facility]   Triage Nurse Advisor

## 2022-09-26 NOTE — ED NOTES
Bed: HW04  Expected date: 9/26/22  Expected time:   Means of arrival:   Comments:  [address]    20 yo F  Mental health

## 2022-09-26 NOTE — ED NOTES
"Pt inconsolable, sobbing/yelling in the hallway. MD assessing the pt- offered to give pt PRN Ativan- pt refused, yelling \"I'm not going to take it (medication).\" pt then postured at the MD and threatened to \"punch him.\" writer returned with medication, writer able to deescalate the pt verbally, and pt agreed to take the medication., 2mg PO ativan administered. 1:1 continued.    It was observed by staff and writer that another pt on the floor attempting to engage with the pt, resulting in the pt sobbing again, crying louder. Pt moved to different area of ED.     10am: pt sobing loudly at other end of ED. Inconsolable. MD assessed the pt and ordered 10mg IM zyprexa. Writer called a code green. When code team arrived, writer notified the pt that they are returning to  and asked if the pt thinks they are feeling calm for transport. Pt declined, writer offered the pt PRN, pt accepting. 10 mg IM zyprexa given, per MD orders.     Pt resting calmly on cart until EMS transport arrived. Pt compliant with transport. All pt belongings returned.   "

## 2022-09-26 NOTE — ED PROVIDER NOTES
"    [facility] EMERGENCY DEPARTMENT ([facility])     September 26, 2022     History     Chief Complaint   Patient presents with     Mental Health Problem     Pt BIB EMS for \"same reason as yesterday.\" Pt endorses feeling \"suicidal\" and anxious upon waking up this AM     HPI  Sadaf Ross is a 21 year old female with a past medical history including intellectual disability, borderline personality disorder, bipolar 1 disorder, who is well-known to the ED d/t her frequent ED visits  who presents to the Emergency Department for evaluation of mental health problem. Patient reports she was brought in by EMS for \"the same reason as yesterday\". She states that she felt anxious and suicidal upon waking up this morning. Patient is yelling at staff.      Past Medical History  Past Medical History:   Diagnosis Date     ADHD (attention deficit hyperactivity disorder)      Bipolar 1 disorder (H)      Borderline personality disorder (H)      Depression      Depressive disorder      Intellectual disability      Obesity      Syncope      Past Surgical History:   Procedure Laterality Date     APPENDECTOMY       APPENDECTOMY       acetaminophen (TYLENOL) 325 MG tablet  alum & mag hydroxide-simethicone (MAALOX  ES) 400-400-40 MG/5ML SUSP suspension  ARIPiprazole lauroxil ER (ARISTADA) 882 MG/3.2ML intra-muscular  benztropine (COGENTIN) 1 MG tablet  calcium carbonate (TUMS) 500 MG chewable tablet  chlorhexidine (PERIDEX) 0.12 % solution  clobetasol (TEMOVATE) 0.05 % CREA cream  cyclobenzaprine (FLEXERIL) 10 MG tablet  diclofenac (VOLTAREN) 1 % topical gel  docusate sodium (COLACE) 50 MG capsule  fluticasone (FLONASE) 50 MCG/ACT nasal spray  guaiFENesin (ROBITUSSIN) 100 MG/5ML SYRP  hydrocortisone (CORTAID) 0.5 % external cream  hydrOXYzine (ATARAX) 50 MG tablet  hyoscyamine (LEVSIN/SL) 0.125 MG sublingual tablet  ibuprofen (ADVIL/MOTRIN) 400 MG tablet  loperamide (IMODIUM) 2 MG capsule  LORazepam (ATIVAN) 0.5 MG " tablet  metoclopramide (REGLAN) 10 MG tablet  multivitamin, therapeutic (THERA-VIT) TABS tablet  omeprazole (PRILOSEC) 40 MG DR capsule  ondansetron (ZOFRAN) 4 MG tablet  polyethylene glycol (MIRALAX) 17 GM/Dose powder  prochlorperazine (COMPAZINE) 10 MG tablet  promethazine (PHENERGAN) 12.5 MG tablet  sennosides (SENOKOT) 8.6 MG tablet  traZODone (DESYREL) 50 MG tablet  venlafaxine (EFFEXOR-ER) 225 MG 24 hr tablet  Vitamin D, Cholecalciferol, 25 MCG (1000 UT) TABS      Allergies   Allergen Reactions     Penicillins Rash and Unknown     Family History  Family History   Problem Relation Age of Onset     Diabetes Type 1 Father      Cancer Paternal Grandfather      Social History   Social History     Tobacco Use     Smoking status: Current Every Day Smoker     Packs/day: 0.50     Years: 5.00     Pack years: 2.50     Types: Vaping Device     Smokeless tobacco: Never Used   Substance Use Topics     Alcohol use: No     Drug use: No      Past medical history, past surgical history, medications, allergies, family history, and social history were reviewed with the patient. No additional pertinent items.       Review of Systems   Psychiatric/Behavioral: Positive for agitation and suicidal ideas. The patient is nervous/anxious.    All other systems reviewed and are negative.    A complete review of systems was performed with pertinent positives and negatives noted in the HPI, and all other systems negative.    Physical Exam   BP: (!) 145/102  Pulse: 97  Temp: 98.2  F (36.8  C)  Resp: 16  SpO2: 100 %  Physical Exam  Vitals and nursing note reviewed.   Constitutional:       General: She is in acute distress.      Appearance: She is well-developed. She is not ill-appearing.   HENT:      Head: Normocephalic and atraumatic.      Right Ear: External ear normal.      Left Ear: External ear normal.      Nose: Nose normal.   Eyes:      Extraocular Movements: Extraocular movements intact.      Conjunctiva/sclera: Conjunctivae normal.    Pulmonary:      Effort: Pulmonary effort is normal. No respiratory distress.   Abdominal:      General: There is no distension.   Musculoskeletal:         General: No swelling or deformity.      Cervical back: Normal range of motion and neck supple.   Skin:     General: Skin is warm and dry.   Neurological:      Mental Status: Mental status is at baseline.      Comments: Alert, oriented   Psychiatric:      Comments: Crying, agitated         ED Course     9:28 AM  The patient was seen and examined by Richie Sinha DO in Room HW04.    Procedures              No results found for any visits on 09/26/22.  Medications   LORazepam (ATIVAN) tablet 2 mg (2 mg Oral Given 9/26/22 0934)   OLANZapine (zyPREXA) injection 10 mg (10 mg Intramuscular Given 9/26/22 1002)        Assessments & Plan (with Medical Decision Making)   21-year-old female well-known to the department comes in to us agitated and suicidal.  Inpatient mission has not been found to be helpful for her in the past.  Behaviors tend to escalate and do not improve.  At this point the patient was given medications to help with her agitation.  She was given Ativan in addition to Zyprexa.  We will discharge her back to her group home.    I have reviewed the nursing notes. I have reviewed the findings, diagnosis, plan and need for follow up with the patient.    Discharge Medication List as of 9/26/2022 10:17 AM          Final diagnoses:   Suicidal ideation   Borderline personality disorder (H)     IEulalia, am serving as a trained medical scribe to document services personally performed by Richie Sinha DO, based on the provider's statements to me.   Richie MCGINNIS DO, was physically present and have reviewed and verified the accuracy of this note documented by Eulalia Lowe.   --  Richie CANO [facility] EMERGENCY DEPARTMENT  9/26/2022     Richie Sinha DO  09/26/22 1400

## 2022-09-26 NOTE — ED NOTES
Pt at group home. Pt goggled the number for [facility] phone number. Pt states she has an active plan for suicidal ideation. Pt did not take her bedtime medications when arrived back to group [place]. Pt arrives crying out loud. Pt currently sitting in hallway.

## 2022-09-26 NOTE — TELEPHONE ENCOUNTER
"Pt calling because she is having hallucinations and is suicidal.    Pt says she started having hallucinations around midnight of her  father and other \"dead people.\" She also states that she feels suicidal.    Pt denies taking any drugs or alcohol. Reports using tobacco. When asked if she has a plan for suicide she states that she has felt this way since she was 11 years old and that she would probably \"jump off a bridge or something.\" Pt reports that she has someone there with her.     Protocol recommends pt call 911. Pt says that she does not wish to call 911 because it is too expensive and would rather call for other means of transportation. She is agreeable to going in for evaluation at the hospital. This writer will call patient back in 10 minutes to ensure she has secured a ride to the hospital.    Kristina Anderson RN, BSN  [facility]   Triage Nurse Advisor      Reason for Disposition    Seeing or hearing or feeling things that are not there (i.e., auditory, visual, or tactile hallucinations)    Additional Information    Negative: Difficult to awaken or acting confused (disoriented, slurred speech) and has diabetes mellitus    Negative: Difficult to awaken or acting confused (disoriented, slurred speech) and new-onset    Negative: Weakness of the face, arm, or leg on one side of the body and new-onset    Negative: Numbness of the face, arm, or leg on one side of the body and new-onset    Negative: Loss of speech or garbled speech and new-onset    Negative: Difficulty breathing and bluish (or gray) lips or face    Negative: Shock suspected (e.g., cold/pale/clammy skin, too weak to stand, low BP, rapid pulse)    Protocols used: CONFUSION - DELIRIUM-A-OH      "

## 2022-09-26 NOTE — ED TRIAGE NOTES
"Pt BIB EMS for \"same reason as yesterday.\" Pt endorses feeling \"suicidal\" and anxious upon waking up this AM. Pt presents sobbing, inconsolable.      Triage Assessment     Row Name 09/26/22 1003       Triage Assessment (Adult)    Airway WDL WDL       Respiratory WDL    Respiratory WDL WDL       Cardiac WDL    Cardiac WDL WDL       Peripheral/Neurovascular WDL    Peripheral Neurovascular WDL WDL    Capillary Refill, General less than/equal to 3 secs       Coby Coma Scale    Best Eye Response 4-->(E4) spontaneous    Best Motor Response 6-->(M6) obeys commands    Best Verbal Response 5-->(V5) oriented    Coby Coma Scale Score 15              "

## 2022-09-26 NOTE — ED NOTES
Have contacted Arlene from the group [place] at [phone]. She is aware that Sadaf will be returning. Arlene states that she will notified the staff that she is coming back

## 2022-09-26 NOTE — ED PROVIDER NOTES
"ED Provider Note  [facility]      History     Chief Complaint   Patient presents with     Suicidal     Pt states she has been thinking about her dead dad a lot lately. It woke her up from her sleep and she started to feel suicidal and wanted to bite herself, but didn't.      HPI  Sadaf Ross is a 21 year old female with history of ADHD, bipolar 1 disorder, borderline personality disorder depression, intellectual disability, presents to the ED with suicidal thoughts.  She has been having worsening depression due to her dad who passed away last spring.  Says she has felt suicidal all day.  She has a plan to bite herself.  She has not been herself yet.  She lives at a group home.  Told the group home staff she needed to come to the ED.  She was evaluated earlier today and decided she \"just needed to come back\".  She says she does not like being at the group home and \"just needs to be here\".    Past Medical History  Past Medical History:   Diagnosis Date     ADHD (attention deficit hyperactivity disorder)      Bipolar 1 disorder (H)      Borderline personality disorder (H)      Depression      Depressive disorder      Intellectual disability      Obesity      Syncope      Past Surgical History:   Procedure Laterality Date     APPENDECTOMY       APPENDECTOMY       acetaminophen (TYLENOL) 325 MG tablet  alum & mag hydroxide-simethicone (MAALOX  ES) 400-400-40 MG/5ML SUSP suspension  ARIPiprazole lauroxil ER (ARISTADA) 882 MG/3.2ML intra-muscular  benztropine (COGENTIN) 1 MG tablet  calcium carbonate (TUMS) 500 MG chewable tablet  chlorhexidine (PERIDEX) 0.12 % solution  clobetasol (TEMOVATE) 0.05 % CREA cream  cyclobenzaprine (FLEXERIL) 10 MG tablet  diclofenac (VOLTAREN) 1 % topical gel  docusate sodium (COLACE) 50 MG capsule  fluticasone (FLONASE) 50 MCG/ACT nasal spray  guaiFENesin (ROBITUSSIN) 100 MG/5ML SYRP  hydrocortisone (CORTAID) 0.5 % external cream  hydrOXYzine (ATARAX) 50 MG " tablet  hyoscyamine (LEVSIN/SL) 0.125 MG sublingual tablet  ibuprofen (ADVIL/MOTRIN) 400 MG tablet  loperamide (IMODIUM) 2 MG capsule  LORazepam (ATIVAN) 0.5 MG tablet  metoclopramide (REGLAN) 10 MG tablet  multivitamin, therapeutic (THERA-VIT) TABS tablet  omeprazole (PRILOSEC) 40 MG DR capsule  ondansetron (ZOFRAN) 4 MG tablet  polyethylene glycol (MIRALAX) 17 GM/Dose powder  prochlorperazine (COMPAZINE) 10 MG tablet  promethazine (PHENERGAN) 12.5 MG tablet  sennosides (SENOKOT) 8.6 MG tablet  traZODone (DESYREL) 50 MG tablet  venlafaxine (EFFEXOR-ER) 225 MG 24 hr tablet  Vitamin D, Cholecalciferol, 25 MCG (1000 UT) TABS      Allergies   Allergen Reactions     Penicillins Rash and Unknown     Family History  Family History   Problem Relation Age of Onset     Diabetes Type 1 Father      Cancer Paternal Grandfather      Social History   Social History     Tobacco Use     Smoking status: Current Every Day Smoker     Packs/day: 0.50     Years: 5.00     Pack years: 2.50     Types: Vaping Device     Smokeless tobacco: Never Used   Substance Use Topics     Alcohol use: No     Drug use: No      Past medical history, past surgical history, medications, allergies, family history, and social history were reviewed with the patient. No additional pertinent items.       Review of Systems   Constitutional: Negative for chills and fever.   Eyes: Negative for visual disturbance.   Respiratory: Negative for shortness of breath.    Cardiovascular: Negative for chest pain.   Gastrointestinal: Negative for abdominal pain, nausea and vomiting.   Genitourinary: Negative for difficulty urinating.   Musculoskeletal: Negative for back pain and neck pain.   Skin: Negative for rash and wound.   Neurological: Negative for headaches.   Psychiatric/Behavioral: Positive for suicidal ideas. The patient is not nervous/anxious.      A complete review of systems was performed with pertinent positives and negatives noted in the HPI, and all other  systems negative.    Physical Exam   BP: (!) 140/86  Pulse: 100  Temp: 98.4  F (36.9  C)  Resp: 18  SpO2: 97 %  Physical Exam  Vitals and nursing note reviewed.   Constitutional:       General: She is not in acute distress.     Appearance: Normal appearance.   HENT:      Head: Normocephalic.      Nose: Nose normal.   Eyes:      Pupils: Pupils are equal, round, and reactive to light.   Cardiovascular:      Rate and Rhythm: Normal rate and regular rhythm.   Pulmonary:      Effort: Pulmonary effort is normal.   Abdominal:      General: There is no distension.   Musculoskeletal:         General: No deformity. Normal range of motion.      Cervical back: Normal range of motion.   Skin:     General: Skin is warm.      Comments: No wounds or bite marks.   Neurological:      Mental Status: She is alert and oriented to person, place, and time.   Psychiatric:         Attention and Perception: Attention normal.         Mood and Affect: Mood normal.         Speech: Speech normal.         Behavior: Behavior is cooperative.         Thought Content: Thought content is not paranoid or delusional. Thought content includes suicidal ideation. Thought content does not include homicidal ideation. Thought content does not include homicidal or suicidal plan.         Cognition and Memory: Cognition normal.         Judgment: Judgment is inappropriate.         ED Course      Procedures       The medical record was reviewed and interpreted.  Mental Health Risk Assessment      PSS-3    Date and Time Over the past 2 weeks have you felt down, depressed, or hopeless? Over the past 2 weeks have you had thoughts of killing yourself? Have you ever attempted to kill yourself? When did this last happen? User   09/25/22 0307 yes yes yes within the last month (but not today) [facility]      C-SSRS ([place])    Date and Time Q1 Wished to be Dead (Past Month) Q2 Suicidal Thoughts (Past Month) Q3 Suicidal Thought Method Q4 Suicidal Intent without Specific Plan Q5  Suicide Intent with Specific Plan Q6 Suicide Behavior (Lifetime) Within the Past 3 Months? RETIRED: Level of Risk per Screen Screening Not Complete User   09/25/22 0307 yes yes yes no no yes -- -- -- [facility]              Suicide assessment completed by mental health (D.E.C., LCSW, etc.)       Results for orders placed or performed during the hospital encounter of 09/25/22   UA with Microscopic reflex to Culture     Status: Abnormal    Specimen: Urine, Clean Catch   Result Value Ref Range    Color Urine Light Yellow Colorless, Straw, Light Yellow, Yellow    Appearance Urine Clear Clear    Glucose Urine Negative Negative mg/dL    Bilirubin Urine Negative Negative    Ketones Urine Negative Negative mg/dL    Specific Gravity Urine 1.018 1.003 - 1.035    Blood Urine Negative Negative    pH Urine 5.5 5.0 - 7.0    Protein Albumin Urine Negative Negative mg/dL    Urobilinogen Urine Normal Normal, 2.0 mg/dL    Nitrite Urine Negative Negative    Leukocyte Esterase Urine Negative Negative    Mucus Urine Present (A) None Seen /LPF    RBC Urine 1 <=2 /HPF    WBC Urine 3 <=5 /HPF    Squamous Epithelials Urine 4 (H) <=1 /HPF    Narrative    Urine Culture not indicated   HCG qualitative urine     Status: Normal   Result Value Ref Range    hCG Urine Qualitative Negative Negative     Medications - No data to display     Assessments & Plan (with Medical Decision Making)   Who is well-known to the ED presents from the [facility] with concern for suicidality.  Has plan to bite herself.  No bite marks on exam.  Physical exam/vital signs are unremarkable.  Patient has had many visits to the ED this week for the same symptoms.  She had 1 visit earlier today.  There is a strong concern that she is here for secondary gain.  She was evaluated by the mental health team.  No indication for admission.  Patient was transported back to [facility].    I have reviewed the nursing notes. I have reviewed the findings, diagnosis, plan and need for follow  up with the patient.    Discharge Medication List as of 9/25/2022  5:43 AM          Final diagnoses:   Suicidal ideation       --  Ghassan Browning DO  [facility] EMERGENCY DEPARTMENT  9/25/2022     Ghassan Browning DO  09/26/22 0116

## 2022-09-26 NOTE — DISCHARGE INSTRUCTIONS
Aftercare Plan  If I am feeling unsafe or I am in a crisis, I will:   Contact my established care providers   Call the National Suicide Prevention Lifeline: 988  Go to the nearest emergency room   Call 911      Warning signs that I or other people might notice when a crisis is developing for me: I start become panicky, I start to look around me seeing if people are following me, I start pacing around, and I stop reaching out to my supports.     Things I am able to do on my own to cope or help me feel better: Listen to music, go for walks, rest, deep breaths, and grounding exercises.  Grounding Techniques:  Try to notice where you are, your surroundings including the people, the sounds like the TV or radio.  Concentrate on your breathing. Take a deep cleansing breath from your diaphragm. Count the breaths as you exhale. Make sure you breath slowly.  Hold something that you find comforting, for some it may be a stuffed animal or a blanket. Notice how it feels in your hands. Is it hard or soft?  During a non-crisis time make a list of positive affirmations. Print them out and keep them handy for times of intense anxiety. At those times, read them aloud.  Try the Ram Power game:  Name 5 things you can see in the room with you  Name 4 things you can feel ( chair on my back  or  feet on floor )   Name 3 things you can hear right now ( people talking  or  tv )   Name 2 things you can smell right now (or, 2 things you like the smell of)   Name 1 good thing about yourself  Create A Safe Place  Image a safe place -- it can be a real or imaginary place:   What do you see -- especially colors?   What sounds do you hear?   What sensations do you feel?   What smells do you smell?   What people or animals would you want in your safe place?   Imagine a protective bubble, wall or boundary around your safe place.   Imagine a door or gate with a guard at your safe place.   Image a lock and key to your safe place and only you can unlock  it.  You can draw or make a collage that represents your safe place.   Choose a souvenir of your safe place -- a color, an object, a song.   Keep your image of your safe place so you can come back to it when you need to.     Reduce Extreme Emotion  QUICKLY:  Changing Your Body Chemistry      T:  Change your body Temperature to change your autonomic nervous system   Use Ice Water to calm yourself down FAST   Put your face in a bowl of ice water (this is the best way; have the person keep his/her face in ice water for 30-45 seconds - initial research is showing that the longer s/he can hold her/his face in the water, the better the response), or   Splash ice water on your face, or hold an ice pack on your face      I:  Intensely exercise to calm down a body revved up by emotion   Examples: running, walking fast, jumping, playing basketball, weight lifting, swimming, calisthenics, etc.   Engage in exercises that DO NOT include violent behaviors. Exercises that utilize violent behaviors tend to function as  behavioral rehearsal,  and rather than calming the person down, may actually  rev  the person up more, increasing the likelihood of violence, and lessening the likelihood that they will  burn off  energy     P:  Progressively relax your muscles   Starting with your hands, moving to your forearms, upper arms, shoulders, neck, forehead, eyes, cheeks and lips, tongue and teeth, chest, upper back, stomach, buttocks, thighs, calves, ankles, feet   Tense (10 seconds,   of the way), then relax each muscle (all the way)   Notice the tension   Notice the difference when relaxed (by tensing first, and then relaxing, you are able to get a more thorough relaxation than by simply relaxing)      P: Paced breathing to relax   The standard technique is to begin with counting the number of steps one takes for a typical inhale, then counting the steps one takes for a typical exhale, and then lengthening the amount of steps for the  "exhalation by one or two steps.  OR  Repeat this pattern for 1-2 minutes  Inhale for four (4) seconds   Exhale for six (6) to eight (8) seconds   Research demonstrated that one can change one's overall level of anxiety by doing this exercise for even a few minutes per day         Things that I am able to do with others to cope or help me better: Listen to music, talk with crisis line, or talk it out with my supports.     Things I can use or do for distraction: Music, walks, and rest.     Changes I can make to support my mental health and wellness: Seek out my supports and talk with them when I need to feel heard and understood.      People in my life that I can ask for help: People I trust such as my grandmas, uncle, staff members at my group home, and Ruby at group home.      Your [facility] has a mental health crisis team you can call 24/7: [facility] Mobile Crisis  [phone]      Other things that are important when I'm in crisis: That people are here to support me.      Additional resources and information:      Crisis Lines  Crisis Text Line  Text 415175  You will be connected with a trained live crisis counselor to provide support.     Por espanol, texto  SIRENA a 870835 o texto a 442-AYUDAME en WhatsApp     The [employer] (LGBTQ Youth Crisis Line)  9.410.666.4184  text START to 902-407        Community Resources  Fast Tracker  Linking people to mental health and substance use disorder resources  fasttrackermn.org      [place] Mental Health Warm Line  Peer to peer support  Monday thru Saturday, 12 pm to 10 pm  795.106.3094 or 0.925.866.3549  Text \"Support\" to 51291     National [employer] Mental Illness (MAGY)  035.833.0422 or 1.888.MAGY.HELPS        Mental Health Apps  My3  https://my3Leafpp.org/     VirtualHopeBox  https://TuneGO.org/apps/virtual-hope-box/        Additional Information  Today you were seen by a licensed mental health professional through Triage and " Transition services, Behavioral Healthcare Providers ([facility])  for a crisis assessment in the Emergency Department at [facility].  It is recommended that you follow up with your established providers (psychiatrist, mental health therapist, and/or primary care doctor - as relevant) as soon as possible. Coordinators from [facility] will be calling you in the next 24-48 hours to ensure that you have the resources you need.  You can also contact [facility] coordinators directly at [phone]. You may have been scheduled for or offered an appointment with a mental health provider. [facility] maintains an extensive network of licensed behavioral health providers to connect patients with the services they need.  We do not charge providers a fee to participate in our referral network.  We match patients with providers based on a patient's specific needs, insurance coverage, and location.  Our first effort will be to refer you to a provider within your care system, and will utilize providers outside your care system as needed.

## 2022-09-26 NOTE — TELEPHONE ENCOUNTER
"Pt calling after getting home from the ED today d/t SI. She was discharged home and was upset that \"they didn't do anything for me\". Reviewed ED visit where they gave lorazepam and zyprexa prior to discharging back to group home. Pt is upset with care she received. Given pt relations number and advised to call back with any other questions or concerns     Reason for Disposition    [1] Follow-up call to recent contact AND [2] information only call, no triage required    Protocols used: INFORMATION ONLY CALL - NO TRIAGE-A-      Stacie Francis RN [facility] September 26, 2022 3:17 PM  "

## 2022-09-27 ENCOUNTER — NURSE TRIAGE (OUTPATIENT)
Dept: FAMILY MEDICINE | Facility: CLINIC | Age: 22
End: 2022-09-27

## 2022-09-27 ENCOUNTER — NURSE TRIAGE (OUTPATIENT)
Dept: NURSING | Facility: CLINIC | Age: 22
End: 2022-09-27

## 2022-09-27 ENCOUNTER — HOSPITAL ENCOUNTER (EMERGENCY)
Facility: CLINIC | Age: 22
Discharge: HOME | End: 2022-09-27
Attending: FAMILY MEDICINE | Admitting: FAMILY MEDICINE
Payer: MEDICARE

## 2022-09-27 DIAGNOSIS — F60.3 BORDERLINE PERSONALITY DISORDER (H): ICD-10-CM

## 2022-09-27 PROCEDURE — 99282 EMERGENCY DEPT VISIT SF MDM: CPT | Performed by: FAMILY MEDICINE

## 2022-09-27 ASSESSMENT — ACTIVITIES OF DAILY LIVING (ADL): ADLS_ACUITY_SCORE: 37

## 2022-09-27 NOTE — TELEPHONE ENCOUNTER
"Patient calling with suicidal ideation - states she has a plan to jump off [place] [place] bridge. States she is at bridge now,  writer asked again if patient was at bridge and she stated no, she is at home now but going there after the call.   Patient states she has been trying to schedule a ride to ER, all day and no one will pick her up. Felt like this every day since Father  in February.    Reports taking hydroxyzine - as prescribed but nothing more. Denies alcohol or recreational drug use.     Called 911 on other line while speaking to patient - 911 dispatch called back and informed writer and other triage nurse they are on their way but patient does this daily and they will call to.   Pt seen in ED 6 times in the past wk    At end of call patient was much calmer, talking about Morgan Reyes, basketball and her birthday on . Informed pt EMS was calling her on other line to discuss transport and hung up the call.     Reminded pt to call us, 911 or 988 if in another crisis.     Reason for Disposition    Patient is threatening suicide now    Sounds like a life-threatening emergency to the triager    Referral phone numbers for crisis intervention and depression, questions about    Answer Assessment - Initial Assessment Questions  1. MAIN CONCERN: \"What happened that made you call today?\"      Feeling very unsafe -\"I want to kill myself\". Felt like this since dad dies in February   2. RISK OF HARM - SUICIDAL IDEATION:  \"Do you ever have thoughts of hurting or killing yourself?\"  (e.g., yes, no, no but preoccupation with thoughts about death)    - WISH TO BE DEAD:  \"Have you wished you were dead or wished you could go to sleep and not wake up?\"    - INTENT:  \"Have you had any thoughts of hurting or killing yourself?\" (e.g., yes, no, N/A) If Yes, ask: \"Are you having these thoughts about killing yourself right NOW?\"    - PLAN: \"Have you thought about how you might do this?\" \"Do you have a specific plan for how you " "would do this?\" (e.g., gun, knife, overdose, no plan, N/A)    - ACCESS: If yes to PLAN, \"Do you have access to *No Answer*?\" (e.g., pills, gun in house, knife in kitchen)      Plan - Has plan to jump off [place] bridge   3. RISK OF HARM - SUICIDE ATTEMPT: \"Have you tried to harm yourself recently?\" If Yes, ask: When was this?\"        Patient states yes - tries to do it often   4. RISK OF HARM - SUICIDAL BEHAVIOR: \"Have you ever done anything, started to do anything, or prepared to do anything to end your life?\" (e.g., collected pills, bought a gun, wrote a suicide note, cut yourself, started but changed your mind)      Took hydroxyzine as prescribed, denies other medication or alcohol ingestion .   5. EVENTS AND STRESSORS: \"Has there been any new stress or recent changes in your life?\" (e.g., death of loved one, homelessness, negative event, relationship breakup, work)     Felt like this since Dad dies in February  6. FUNCTIONAL IMPAIRMENT: \"How have things been going for you overall? Have you had more difficulty than usual doing your normal daily activities?\"  (e.g., better, same, worse; self-care, school, work, interactions)      Patient  7. SUPPORT: \"Who is with you now?\" \"Who do you live with?\" \"Do you have family or friends who you can talk to?\"       Patient states lives in group home and has aworker there with her now, but they are busy with another resident of group home. Patient states has a mom but doesn't live with her  8. THERAPIST: \"Do you have a counselor or therapist? Name?\"      Yes, unable to remember name  9. ALCOHOL USE OR SUBSTANCE USE (DRUG USE): \"Do you drink alcohol or use any illegal drugs?\"      no  10. OTHER: \"Do you have any other physical symptoms right now?\" (e.g., fever)        no  11. PREGNANCY or POSTPARTUM: \"Is there any chance you are pregnant?\" \"When was your last menstrual period?\" \"Were you recently pregnant?\" \"When did you give birth?\"        no    Protocols used: SUICIDE " [id number]    Gela Arthur, RN  [facility]

## 2022-09-27 NOTE — TELEPHONE ENCOUNTER
"Patient is calling to report anxiety.  She states that in her dream, she heard someone tell her to wake up.  She woke up \"paranoid\" and went into the garage.  Right now, she feel anxious and \"in distress.\"    She states she has been dealing with anxiety and is seeing a psychiatrist.  She doesn't know her triggers.  She states when she goes into the ED, she feels like she's not being heard.    She denies suicidal thoughts right now.    She states she takes ativan prn and olanzapine for anxiety.  She has staff at home, she is not alone but they are currently asleep.    Disposition: See PCP within 3 days.  Advised patient to be seen by whoever prescribes her psyc medications.  Advised patient to wake up one of the staff members in her home so that she can take ativan for her anxiety.      Maria Eugenia Gonsales RN, BSN Nurse Triage Advisor 9/27/2022 7:07 AM     Reason for Disposition    Symptoms interfere with sleep    Additional Information    Negative: SEVERE difficulty breathing (e.g., struggling for each breath, speaks in single words)    Negative: Bluish (or gray) lips or face now    Negative: Difficult to awaken or acting confused (e.g., disoriented, slurred speech)    Negative: Hysterical or combative behavior    Negative: Sounds like a life-threatening emergency to the triager    Negative: [1] Difficulty breathing AND [2] persists > 10 minutes AND [3] not relieved by reassurance provided by triager    Negative: [1] Lightheadedness or dizziness AND [2] persists > 10 minutes AND [3] not relieved by reassurance provided by triager    Negative: [1] Alcohol or drug use, known or suspected AND [2] feeling very shaky (i.e., visible tremors of hands)    Negative: Patient sounds very sick or weak to the triager    Negative: Symptoms interfere with work or school    Negative: Requesting to talk to a counselor (e.g., mental health worker, psychiatrist)    Negative: Patient sounds very upset or troubled to the triager    Negative: [1] " Symptoms of anxiety or panic AND [2] has not been evaluated for this by physician    Negative: [1] Started on anti-anxiety medication AND [2] no relief    Negative: [1] Significant weight loss (or gain) AND [2] not dieting    Negative: Taking thyroid medications    Negative: Substance use (drug use) or misuse, known or suspected    Negative: Unhealthy caffeine use, known or suspected (e.g., > 2 cups of coffee/tea or > 4 cans of soda / day)    Negative: Taking herbal remedies    Negative: Recent traumatic event (e.g., death of a loved one, job loss, victim/witness of crime)    Protocols used: ANXIETY AND PANIC ATTACK-A-AH

## 2022-09-27 NOTE — ED NOTES
"Patient denies suicidal thoughts states she was bored at home and decided to call an ambulance to bring her to the ED. Patient that began stating \"send me fucking home, Fuck this place, I'm going to blow this place up.\"  "

## 2022-09-27 NOTE — ED PROVIDER NOTES
History     Chief Complaint   Patient presents with     Suicidal     Feeling suicidal wants to jump off a bridge     HPI  Sadaf Ross is a 21 year old female well-known to our emergency department and with a known psychiatric history who presents the emerge department multiple times even multiple times in 1 day for claims that she is going to harm her self.  Patient usually has a trouble some course in the ER with the last time in the ER she claimed that she did not want to go home and had to be medicated to go home.  When in the ER she frequently codes because of her disruptive and aggressive behavior.  Patient presents today for suicidal ideation from her group home but has not tried to harm her self and did not get into any altercations with the group home per group home personnel.  Group home states they will take her back if she wants to come back and do not feel she is at risk to harm her self.  The patient herself states that she has no intention to harm herself until she is at least 49 years old.    I have reviewed the Medications, Allergies, Past Medical and Surgical History, and Social History in the [facility] system.    Past Medical History:   Diagnosis Date     ADHD (attention deficit hyperactivity disorder)      Bipolar 1 disorder (H)      Borderline personality disorder (H)      Depression      Depressive disorder      Intellectual disability      Obesity      Syncope        Past Surgical History:   Procedure Laterality Date     APPENDECTOMY       APPENDECTOMY             Dose / Directions   acetaminophen 325 MG tablet  Commonly known as: TYLENOL      Dose: 650 mg  Take 650 mg by mouth every 6 hours as needed for mild pain  Refills: 0     alum & mag hydroxide-simethicone 400-400-40 MG/5ML Susp suspension  Commonly known as: MAALOX  ES  Indication: Sour Stomach      Dose: 10 mL  Take 10 mLs by mouth every 6 hours as needed for indigestion  Refills: 0     ARIPiprazole lauroxil  MG/3.2ML  intra-muscular  Commonly known as: ARISTADA      Dose: 882 mg  Inject 882 mg into the muscle every 28 days On the 22nd of each month  Refills: 0     benztropine 1 MG tablet  Commonly known as: COGENTIN      Dose: 1 mg  Take 1 mg by mouth every evening  Refills: 0     calcium carbonate 500 MG chewable tablet  Commonly known as: TUMS      Dose: 1 chew tab  Take 1 chew tab by mouth 2 times daily as needed for heartburn  Refills: 0     chlorhexidine 0.12 % solution  Commonly known as: PERIDEX      Dose: 15 mL  Swish and spit 15 mLs in mouth 2 times daily  Refills: 0     clobetasol 0.05 % Crea cream  Commonly known as: TEMOVATE  Indication: rash      Dose: 1 Application  Apply 1 Application topically 2 times daily For up to 14 days  Refills: 0     cyclobenzaprine 10 MG tablet  Commonly known as: FLEXERIL      Dose: 10 mg  Take 10 mg by mouth 3 times daily as needed for muscle spasms  Refills: 0     diclofenac 1 % topical gel  Commonly known as: VOLTAREN      Dose: 2 g  Apply 2 g topically daily as needed for moderate pain  Refills: 0     docusate sodium 50 MG capsule  Commonly known as: COLACE      Dose: 100 mg  Take 100 mg by mouth daily  Refills: 0     fluticasone 50 MCG/ACT nasal spray  Commonly known as: FLONASE      Dose: 2 spray  Spray 2 sprays into both nostrils daily  Refills: 0     guaiFENesin 100 MG/5ML Syrp  Commonly known as: ROBITUSSIN      Dose: 10 mL  Take 10 mLs by mouth 3 times daily as needed for cough  Refills: 0     hydrocortisone 0.5 % external cream  Commonly known as: CORTAID      Apply topically 3 times daily as needed for rash  Refills: 0     hydrOXYzine 50 MG tablet  Commonly known as: ATARAX      Dose: 50 mg  Take 50 mg by mouth 2 times daily as needed for anxiety  Refills: 0     hyoscyamine 0.125 MG sublingual tablet  Commonly known as: LEVSIN/SL      Dose: 0.125 mg  Place 0.125 mg under the tongue every 4 hours as needed (nausea)  Refills: 0     ibuprofen 400 MG tablet  Commonly known as:  ADVIL/MOTRIN      Dose: 400 mg  Take 400 mg by mouth 3 times daily as needed for moderate pain  Refills: 0     loperamide 2 MG capsule  Commonly known as: IMODIUM      Dose: 2 mg  Take 2 mg by mouth 4 times daily as needed for diarrhea  Refills: 0     LORazepam 0.5 MG tablet  Commonly known as: ATIVAN      Dose: 0.5 mg  Take 0.5 mg by mouth once as needed for anxiety Give as needed any time she has the urge to call 911 or to visit the ER  Refills: 0     metoclopramide 10 MG tablet  Commonly known as: REGLAN      Dose: 10 mg  Take 10 mg by mouth every 6 hours as needed  Refills: 0     multivitamin, therapeutic Tabs tablet      Dose: 1 tablet  Take 1 tablet by mouth daily  Refills: 0     omeprazole 40 MG DR capsule  Commonly known as: priLOSEC      Dose: 40 mg  Take 40 mg by mouth daily before breakfast  Refills: 0     ondansetron 4 MG tablet  Commonly known as: ZOFRAN  Indication: Nausea and Vomiting      Dose: 4 mg  Take 4 mg by mouth every 8 hours as needed for nausea  Refills: 0     polyethylene glycol 17 GM/Dose powder  Commonly known as: MIRALAX      Dose: 17 g  Take 17 g by mouth daily  Refills: 0     prochlorperazine 10 MG tablet  Commonly known as: COMPAZINE      Dose: 10 mg  Take 10 mg by mouth every 6 hours as needed for nausea or vomiting  Refills: 0     promethazine 12.5 MG tablet  Commonly known as: PHENERGAN      Dose: 12.5 mg  Take 12.5 mg by mouth every 4 hours  Refills: 0     sennosides 8.6 MG tablet  Commonly known as: SENOKOT      Dose: 1 tablet  Take 1 tablet by mouth 2 times daily as needed for constipation  Refills: 0     traZODone 50 MG tablet  Commonly known as: DESYREL      Dose: 50 mg  Take 50 mg by mouth At Bedtime  Refills: 0     venlafaxine 225 MG 24 hr tablet  Commonly known as: EFFEXOR-ER      Dose: 225 mg  Take 225 mg by mouth daily  Refills: 0     Vitamin D (Cholecalciferol) 25 MCG (1000 UT) Tabs      Dose: 1,000 Units  Take 1,000 Units by mouth daily  Refills: 0            Past  medical history, past surgical history, medications, and allergies were reviewed with the patient. Additional pertinent items: None    Family History   Problem Relation Age of Onset     Diabetes Type 1 Father      Cancer Paternal Grandfather        Social History     Tobacco Use     Smoking status: Every Day     Packs/day: 0.50     Years: 5.00     Pack years: 2.50     Types: Vaping Device, Cigarettes     Smokeless tobacco: Never   Substance Use Topics     Alcohol use: No     Social history was reviewed with the patient. Additional pertinent items: None    Allergies   Allergen Reactions     Penicillins Rash and Unknown       Review of Systems  A complete review of systems was performed with pertinent positives and negatives noted in the HPI, and all other systems negative.    Physical Exam          Physical Exam  Nursing note reviewed.   Constitutional:       General: She is not in acute distress.     Appearance: She is not diaphoretic.   HENT:      Head: Atraumatic.   Eyes:      Extraocular Movements: Extraocular movements intact.      Pupils: Pupils are equal, round, and reactive to light.   Pulmonary:      Effort: No respiratory distress.   Musculoskeletal:         General: No deformity.      Cervical back: Neck supple.   Neurological:      General: No focal deficit present.      Mental Status: She is alert and oriented to person, place, and time.   Psychiatric:      Comments: Calm, appears to have good insight         ED Course        Procedures         Patient was evaluated and case was discussed with the group home as well.    Assessments & Plan (with Medical Decision Making)     I have reviewed the nursing notes.    At this time the patient is not suicidal, claims she will not harm herself and wants to go back to the group home.    I have reviewed the findings, diagnosis, plan and need for follow up with the patient.      Final diagnoses:   Borderline personality disorder (H)     Back to group home  today    Please take your medications as directed    Eat a balanced diet    Please make an appointment to follow up with Your Primary Care Provider in one week for recheck.    HEATHER CANCHOLA MD    9/27/2022   [facility] EMERGENCY DEPARTMENT     Heather Canchola MD  09/27/22 1622       Heather Canchola MD  10/12/22 1040

## 2022-09-27 NOTE — TELEPHONE ENCOUNTER
S: Having suicidal thoughts.  B:  In September she has been to the ED 11 times and has called FNA triage line 10 times.    9/24  Last saw Dr. Wilkins at [facility],  her PCP.    9/14 last saw Ilda LEAL at [facility]. ([facility])  for behavior health.    Psychiatric/Behavioral: positive for agitation & suicidal ideas. The patient is nervous/anxious.  This evening told writer she wants to jump off a bridge. Lives at a group home.  At times does not take her medications as ordered.    A: Writer gave patient the [facility] nurse triage line so the nursing staff can contact patient's PCP.      R: Patient is calm and is going to call [facility] nurse triage line.    Autumn Alcaraz RN, MA  [facility] Triage Nurse Advisor    Reason for Disposition    [1] Patient is not threatening suicide now BUT [2] has a suicide PLAN (e.g., overdose, gunshot) and ACCESS (e.g., collecting pills, gun in house)    Additional Information    Negative: Patient attempted suicide    Negative: Patient is threatening suicide now    Negative: Violent behavior, or threatening to physically hurt or kill someone    Negative: [1] Patient is very confused (disoriented, slurred speech) AND [2] no other adult (e.g., friend or family member) available    Negative: [1] Difficult to awaken or acting very confused (disoriented, slurred speech) AND [2] new-onset    Negative: Sounds like a life-threatening emergency to the triager    Negative: Patient sounds very sick or weak to the triager    Negative: [1] Depression symptoms (sadness, hopelessness, decreased energy) AND [2] unable to do any normal activities (e.g., self care, school, work; in comparison to baseline).    Protocols used: SUICIDE [id number]

## 2022-09-27 NOTE — ED NOTES
Group home contacted, transportation set up to bring patient back to group [place]. Group home states patient has been planning to come to ED all morning.

## 2022-09-27 NOTE — DISCHARGE INSTRUCTIONS
Back to group home today    Please take your medications as directed    Eat a balanced diet    Please make an appointment to follow up with Your Primary Care Provider in one week for recheck.

## 2022-09-28 ENCOUNTER — TELEPHONE (OUTPATIENT)
Dept: NURSING | Facility: CLINIC | Age: 22
End: 2022-09-28

## 2022-09-28 ENCOUNTER — NURSE TRIAGE (OUTPATIENT)
Dept: NURSING | Facility: CLINIC | Age: 22
End: 2022-09-28

## 2022-09-29 ENCOUNTER — NURSE TRIAGE (OUTPATIENT)
Dept: NURSING | Facility: CLINIC | Age: 22
End: 2022-09-29

## 2022-09-29 NOTE — TELEPHONE ENCOUNTER
"Pt calling in to nurse triage today with feelings of claustrophobia, anxiety and depression. Pt is current resident of  facility. Pt. Reports that she feels like the walls are closing in on her. 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 States that she \"Lost whole family\". In March of last year Dad  at age. Reports to RN that it is \"Hard to face reality\" and that people say they care but she cant feel that anyone has love for her. RN discussed s/sx no current suicidal ideation. Reports that she will be going to a restaurant for her birthday and we discuss this as a time to look forward to and be excited for. [employer] all you can eat buffet, Fav- mac 'n cheese. Conversation ended positively with agreement by Pt to lean on friends, roommates and staff at the [facility] for support. Pt will call back if s/sx continue or worsen. Pt. Verbalizes agreement and understanding.    Najma Reyes, [place], [place], PHN on 2022 at 12:12 PM  [facility]  RN utilized sound nursing judgement based on facility triage protocols during this encounter.           Reason for Disposition    Depression is main problem or symptom (e.g., feelings of sadness or hopelessness)    Suicide thoughts, threats, attempts, or questions    Recent death of a loved one    Additional Information    Negative: SEVERE difficulty breathing (e.g., struggling for each breath, speaks in single words)    Negative: Bluish (or gray) lips or face    Negative: Difficult to awaken or acting confused (e.g., disoriented, slurred speech)    Negative: Hysterical or combative behavior    Negative: Sounds like a life-threatening emergency to the triager    Negative: Suicide thoughts, threats, attempts, or questions    Negative: Chest pain     Always has chest pain has seen HCP    Negative: Palpitations, skipped heart beat, or rapid heart beat    Negative: Violent behavior, or threatening to physically hurt or kill someone    Negative: " "Patient is very confused (disoriented, slurred speech) and no other adult (e.g., friend or family member) available    Negative: Patient is threatening suicide now    Negative: Patient attempted suicide    Negative: Difficult to awaken or acting very confused (disoriented, slurred speech) and new-onset    Negative: Sounds like a life-threatening emergency to the triager    Negative: Patient attempted suicide    Negative: Patient is threatening suicide now    Negative: Violent behavior, or threatening to physically hurt or kill someone    Negative: Patient is very confused (disoriented, slurred speech) and no other adult (e.g., friend or family member) available    Negative: Difficult to awaken or acting very confused (disoriented, slurred speech) and of new-onset    Negative: Sounds like a life-threatening emergency to the triager    Negative: Depression is main symptom and is not threatening suicide    Negative: Depression symptoms (sadness, hopelessness, decreased energy) and unable to do any normal activities (e.g., self care, school, work; in comparison to baseline).    Negative: Patient sounds very sick or weak to the triager    Negative: Patient is not threatening suicide now BUT has a suicide PLAN (e.g., overdose, gunshot) and ACCESS (e.g., collecting pills, gun in house)    Negative: Patient is not threatening suicide now BUT has had SUICIDAL BEHAVIORS in past 3 months    Negative: Patient wants to be seen    Negative: Sometimes has thoughts of suicide    Negative: Patient is evasive or refuses to answer questions regarding intent to harm themselves    Negative: Depression symptoms (sadness, hopelessness, decreased energy) interfere with work or school    Negative: Requesting to talk with a counselor (mental health worker, psychiatrist, etc.)    Negative: Substance use (drug use) or unhealthy alcohol use, known or suspected    Answer Assessment - Initial Assessment Questions  1. CONCERN: \"Did anything happen " "that prompted you to call today?\"      Claustrophobic, feels like the walls closing in    2. ANXIETY SYMPTOMS: \"Can you describe how you (your loved one; patient) have been feeling?\" (e.g., tense, restless, panicky, anxious, keyed up, overwhelmed, sense of impending doom).       No enough personal space, anxiety, night sweats, shaky    3. ONSET: \"How long have you been feeling this way?\" (e.g., hours, days, weeks)      For couple days - had these before    4. SEVERITY: \"How would you rate the level of anxiety?\" (e.g., 0 - 10; or mild, moderate, severe).      8/10  5. FUNCTIONAL IMPAIRMENT: \"How have these feelings affected your ability to do daily activities?\" \"Have you had more difficulty than usual doing your normal daily activities?\" (e.g., getting better, same, worse; self-care, school, work, interactions)      NO  6. HISTORY: \"Have you felt this way before?\" \"Have you ever been diagnosed with an anxiety problem in the past?\" (e.g., generalized anxiety disorder, panic attacks, PTSD). If Yes, ask: \"How was this problem treated?\" (e.g., medicines, counseling, etc.)      Anxiety and type 1 bipolar depression     7. RISK OF HARM - SUICIDAL IDEATION: \"Do you ever have thoughts of hurting or killing yourself?\" If Yes, ask:  \"Do you have these feelings now?\" \"Do you have a plan on how you would do this?\"      Feels safe- no plans right now to hurt oneself way   8. TREATMENT:  \"What has been done so far to treat this anxiety?\" (e.g., medicines, relaxation strategies). \"What has helped?\"      Taking medication   9. TREATMENT - THERAPIST: \"Do you have a counselor or therapist? Name?\"      therapist and psychiatry- when I get mad I cancel appointments      10. POTENTIAL TRIGGERS: \"Do you drink caffeinated beverages (e.g., coffee, shelby, teas), and how much daily?\" \"Do you drink alcohol or use any drugs?\" \"Have you started any new medicines recently?\"      No  10. PATIENT SUPPORT: \"Who is with you now?\" \"Who do you live " "with?\" \"Do you have family or friends who you can talk to?\"         Lives at a group home     11. OTHER SYMPTOMS: \"Do you have any other symptoms?\" (e.g., feeling depressed, trouble concentrating, trouble sleeping, trouble breathing, palpitations or fast heartbeat, chest pain, sweating, nausea, or diarrhea)        Shaking     12. PREGNANCY: \"Is there any chance you are pregnant?\" \"When was your last menstrual period?\"    Protocols used: ANXIETY AND PANIC ATTACK-A-OH, DEPRESSION-A-OH, SUICIDE [id number]    COVID 19 Nurse Triage Plan/Patient Instructions    Please be aware that novel coronavirus (COVID-19) may be circulating in the community. If you develop symptoms such as fever, cough, or SOB or if you have concerns about the presence of another infection including coronavirus (COVID-19), please contact your health care provider or visit [email or web address].     Disposition/Instructions    Home care recommended. Follow home care protocol based instructions.    Thank you for taking steps to prevent the spread of this virus.  o Limit your contact with others.  o Wear a simple mask to cover your cough.  o Wash your hands well and often.    Resources    [facility]: About COVID-19: [email or web address]    CDC: What to Do If You're Sick: www.cdc.gov/coronavirus/2019-ncov/about/steps-when-sick.html    CDC: Ending Home Isolation: www.cdc.gov/coronavirus/2019-ncov/hcp/disposition-in-home-patients.html     CDC: Caring for Someone: www.cdc.gov/coronavirus/2019-ncov/if-you-are-sick/care-for-someone.html     [facility]: Interim Guidance for Hospital Discharge to Home: www.health.[facility].[place].us/diseases/coronavirus/hcp/hospdischarge.pdf    [facility] clinical trials (COVID-19 research studies): [email or web address]-trials     Below are the COVID-19 hotlines at the [place] Department of [facility] ([facility]). Interpreters are available.   o For health questions: Call 660-246-5379 or " 1-694.758.8913 (7 a.m. to 7 p.m.)  o For questions about schools and childcare: Call 310-091-2546 or 1-968.181.4737 (7 a.m. to 7 p.m.)

## 2022-09-29 NOTE — TELEPHONE ENCOUNTER
Nurse Triage SBAR    Is this a 2nd Level Triage? NO    Situation: Pt called in stating she is having 'impulsive thoughts' and is 'freaking out'.    Background: Pt states she has been seen multiple times for suicidal attempts starting at 11 years old when she cut herself. She was recently seen and they 'did nothing' to help her.    Assessment: Sadaf tells me she is having 'weird thought, numerous thoughts'. She has tried distracting herself but continues to be agitated. She tells me she is with 2 staff who are aware of her current condition as well as her . She states she is having heavy bleeding and 'wanted to punch things' but didn't. She is sad, lonely, frustrated, and is pissed off. She goes on to tell me that she is thinking about cutting herself but does not have anything to do it with.     Protocol Recommended Disposition:   Call  Now    Recommendation: Protocol dispo is to call EMS. I advised her of this. She is hesitant as she states she 'doesn't want to go to the hospital' but states she will call. I did call 911 dispatch to make sure she called and if not, to do a welfare check.    Day Esteves RN  [facility] Nurse Advisor   9/28/2022  8:00 PM    Reason for Disposition    Patient is threatening suicide now    Additional Information    Negative: Patient attempted suicide    Protocols used: SUICIDE [id number]

## 2022-09-29 NOTE — TELEPHONE ENCOUNTER
Patient states she can't control herself tonight.  She is crying as she is listening to happy songs.    She feels like she wants to cut herself.    She does not have a weapon to cut herself with.    She does not have a plan to get a weapon.  She states she tried to cut herself earlier but she stopped herself. She states she tried to use a fork.    She states she feels like she is going to kill herself.  She states she does not have a plan.   She states she has someone with you.  Patient is states she is unsure if she is safe.    Per protocol patient advised to go to the ER. Patient will call 911.    Mercy Stevenson RN on 9/29/2022 at 6:47 PM        Reason for Disposition    [1] Patient is threatening suicide now AND [2] willing to come in    Additional Information    Negative: [1] Patient has attempted suicide AND [2] has major injuries or serious overdose    Negative: [1] Patient is threatening suicide AND [2] has a deadly weapon (e.g.,  firearm, knife)    Negative: Suicide attempt in progress now and can't be stopped    Negative: [1] Patient is threatening suicide now AND [2] unwilling to go to ER or combative(Caution: police may be needed)    Negative: Seeing, hearing or feeling things that are not there    Negative: [1] Caller expresses suicidal thoughts AND [2] hangs up AND [3] triager unable to complete triage    Negative: Sounds like a life-threatening emergency to the triager    Negative: [1] Postpartum depression BUT [2] NO suicidal thoughts or concerns    Negative: [1] Depression concerns or grief reaction BUT [2] NO suicidal thoughts or concerns    Negative: Homicidal behavior is the main concern    Negative: [1] Patient has attempted suicide today AND [2] has minor injuries or overdose    Protocols used: SUICIDE [id number]

## 2022-09-29 NOTE — PROGRESS NOTES
"S: Sadaf Ross is a 21 year old here for review of ultrasound.     Pt uses Depo Provera for contraception. States that she just started using this. Had an injection on 7/12/22 and then noticed a sudden onset of heavy vaginal bleeding with clots the size of a quarter. Lasted approximately 1-2 hours then resolved. Did present to the ED but had no bleeding upon arrival.     Clinic appointment with Dr. Alva on 8/30/22. Not having bleeding at that time. Normal work up. Side effect of irregular bleeding patterns with depo was reviewed. Pelvic ultrasound ordered to r/o other causes.    Ultrasound performed today- all normal.     Pt reports she has not had bleeding since that time. No concerns. Has her next dose of depo scheduled for the end of the month.     O: /84 (BP Location: Left arm, Patient Position: Sitting, Cuff Size: Adult Regular)   Pulse 73   Ht 1.626 m (5' 4\")   Wt 107.3 kg (236 lb 8 oz)   LMP 08/25/2022   BMI 40.60 kg/m      Mood: alert, engaged in conversation, appropriate questions   No sign of acute distress    Ultrasound:  HISTORY: Excessive bleeding in premenopausal period; Spotting     TECHNIQUE: The pelvis was scanned in standard fashion with  transabdominal and transvaginal transducer(s) using both grey scale  and limited color Doppler techniques.     FINDINGS:  The uterus measures 6.8 x 3.9 x 4.7, and there is no evidence of a  focal fibroid.  The endometrium is within normal limits and measures 5  mm. There is no free fluid in the pelvis.     The right ovary measures 2.0 x 3.0 x 1.7 and the left ovary measures  1.5 x 2.3 x 2.0. There is no adnexal mass. There is normal blood flow  to the ovaries.    IMPRESSION: Normal pelvic ultrasound.    A: (N92.1) Breakthrough bleeding on depo provera  (primary encounter diagnosis)    P:  - Reviewed normal ultrasound.  - Discussed normal bleeding variations s/p initiation of depo provera.   No concerns at this time.   - Will get next dose of " jesus at [facility] on 9/28/22.    Pt verbalized understanding and agrees with plan of care.     LINN López, CORTES    15 minutes spent on the date of the encounter doing chart review, history and exam, documentation and further activities as noted above

## 2022-09-29 NOTE — TELEPHONE ENCOUNTER
Pt calling upset stating she did not want a 911 call, states she is irritated  And would like an EMS ambulance, not the police.    Informed pt will call for an [facility] dispatch back. Per dispatcher, the police was just out there. They will let the police know that she is upset.    Paula Barnes RN, BSN  9/28/2022 at 9:06 PM  [facility]

## 2022-10-01 ASSESSMENT — ENCOUNTER SYMPTOMS
ABDOMINAL PAIN: 0
FEVER: 0
SHORTNESS OF BREATH: 0

## 2022-10-02 ENCOUNTER — NURSE TRIAGE (OUTPATIENT)
Dept: NURSING | Facility: CLINIC | Age: 22
End: 2022-10-02

## 2022-10-02 NOTE — TELEPHONE ENCOUNTER
Patient is complaining of chest pain  Symptoms started one to two weeks ago  Pain is in the middle of the chest and pain is sharp  Rates pain in chest area 8/10  Denies any difficulty breathing,  Fever  Patient says pain is constant  Triage guidelines recommend to go to ED now  Caller verbalized and understands directives      Reason for Disposition    SEVERE chest pain    Additional Information    Negative: SEVERE difficulty breathing (e.g., struggling for each breath, speaks in single words)    Negative: Difficult to awaken or acting confused (e.g., disoriented, slurred speech)    Negative: Shock suspected (e.g., cold/pale/clammy skin, too weak to stand, low BP, rapid pulse)    Negative: Passed out (i.e., lost consciousness, collapsed and was not responding)    Negative: [1] Chest pain lasts > 5 minutes AND [2] age > 44    Negative: [1] Chest pain lasts > 5 minutes AND [2] age > 30 AND [3] one or more cardiac risk factors (e.g., diabetes, high blood pressure, high cholesterol, smoker, or strong family history of heart disease)    Negative: [1] Chest pain lasts > 5 minutes AND [2] history of heart disease (i.e., angina, heart attack, heart failure, bypass surgery, takes nitroglycerin)    Negative: [1] Chest pain lasts > 5 minutes AND [2] described as crushing, pressure-like, or heavy    Negative: Heart beating < 50 beats per minute OR > 140 beats per minute    Negative: Visible sweat on face or sweat dripping down face    Negative: Sounds like a life-threatening emergency to the triager    Negative: Followed a chest injury    Protocols used: CHEST PAIN-A-

## 2022-10-03 ENCOUNTER — HOSPITAL ENCOUNTER (EMERGENCY)
Facility: CLINIC | Age: 22
Discharge: HOME | End: 2022-10-03
Attending: EMERGENCY MEDICINE | Admitting: EMERGENCY MEDICINE
Payer: MEDICARE

## 2022-10-03 ENCOUNTER — APPOINTMENT (OUTPATIENT)
Dept: GENERAL RADIOLOGY | Facility: CLINIC | Age: 22
End: 2022-10-03
Attending: EMERGENCY MEDICINE
Payer: MEDICARE

## 2022-10-03 VITALS
RESPIRATION RATE: 16 BRPM | BODY MASS INDEX: 40.05 KG/M2 | OXYGEN SATURATION: 98 % | HEART RATE: 115 BPM | HEIGHT: 64 IN | TEMPERATURE: 97.9 F | DIASTOLIC BLOOD PRESSURE: 83 MMHG | WEIGHT: 234.6 LBS | SYSTOLIC BLOOD PRESSURE: 126 MMHG

## 2022-10-03 DIAGNOSIS — R07.9 ACUTE CHEST PAIN: ICD-10-CM

## 2022-10-03 LAB
ALBUMIN SERPL-MCNC: 4.4 G/DL (ref 3.4–5)
ALP SERPL-CCNC: 66 U/L (ref 40–150)
ALT SERPL W P-5'-P-CCNC: 26 U/L (ref 0–50)
ANION GAP SERPL CALCULATED.3IONS-SCNC: 9 MMOL/L (ref 3–14)
AST SERPL W P-5'-P-CCNC: 12 U/L (ref 0–45)
BASOPHILS # BLD AUTO: 0.1 10E3/UL (ref 0–0.2)
BASOPHILS NFR BLD AUTO: 1 %
BILIRUB SERPL-MCNC: 0.4 MG/DL (ref 0.2–1.3)
BUN SERPL-MCNC: 15 MG/DL (ref 7–30)
CALCIUM SERPL-MCNC: 9.4 MG/DL (ref 8.5–10.1)
CHLORIDE BLD-SCNC: 111 MMOL/L (ref 94–109)
CO2 SERPL-SCNC: 19 MMOL/L (ref 20–32)
CREAT SERPL-MCNC: 0.65 MG/DL (ref 0.52–1.04)
EOSINOPHIL # BLD AUTO: 0.3 10E3/UL (ref 0–0.7)
EOSINOPHIL NFR BLD AUTO: 2 %
ERYTHROCYTE [DISTWIDTH] IN BLOOD BY AUTOMATED COUNT: 13 % (ref 10–15)
GFR SERPL CREATININE-BSD FRML MDRD: >90 ML/MIN/1.73M2
GLUCOSE BLD-MCNC: 99 MG/DL (ref 70–99)
HCG SERPL QL: NEGATIVE
HCT VFR BLD AUTO: 38.3 % (ref 35–47)
HGB BLD-MCNC: 13.6 G/DL (ref 11.7–15.7)
IMM GRANULOCYTES # BLD: 0 10E3/UL
IMM GRANULOCYTES NFR BLD: 0 %
LYMPHOCYTES # BLD AUTO: 3.4 10E3/UL (ref 0.8–5.3)
LYMPHOCYTES NFR BLD AUTO: 29 %
MCH RBC QN AUTO: 29.4 PG (ref 26.5–33)
MCHC RBC AUTO-ENTMCNC: 35.5 G/DL (ref 31.5–36.5)
MCV RBC AUTO: 83 FL (ref 78–100)
MONOCYTES # BLD AUTO: 0.6 10E3/UL (ref 0–1.3)
MONOCYTES NFR BLD AUTO: 5 %
NEUTROPHILS # BLD AUTO: 7.4 10E3/UL (ref 1.6–8.3)
NEUTROPHILS NFR BLD AUTO: 63 %
NRBC # BLD AUTO: 0 10E3/UL
NRBC BLD AUTO-RTO: 0 /100
PLATELET # BLD AUTO: 246 10E3/UL (ref 150–450)
POTASSIUM BLD-SCNC: 3.7 MMOL/L (ref 3.4–5.3)
PROT SERPL-MCNC: 8.1 G/DL (ref 6.8–8.8)
RBC # BLD AUTO: 4.62 10E6/UL (ref 3.8–5.2)
SODIUM SERPL-SCNC: 139 MMOL/L (ref 133–144)
TROPONIN I SERPL HS-MCNC: 19 NG/L
WBC # BLD AUTO: 11.8 10E3/UL (ref 4–11)

## 2022-10-03 PROCEDURE — 93005 ELECTROCARDIOGRAM TRACING: CPT | Performed by: EMERGENCY MEDICINE

## 2022-10-03 PROCEDURE — 36415 COLL VENOUS BLD VENIPUNCTURE: CPT | Performed by: EMERGENCY MEDICINE

## 2022-10-03 PROCEDURE — 84703 CHORIONIC GONADOTROPIN ASSAY: CPT | Performed by: EMERGENCY MEDICINE

## 2022-10-03 PROCEDURE — 80053 COMPREHEN METABOLIC PANEL: CPT | Performed by: EMERGENCY MEDICINE

## 2022-10-03 PROCEDURE — 71046 X-RAY EXAM CHEST 2 VIEWS: CPT

## 2022-10-03 PROCEDURE — 85025 COMPLETE CBC W/AUTO DIFF WBC: CPT | Performed by: EMERGENCY MEDICINE

## 2022-10-03 PROCEDURE — 82040 ASSAY OF SERUM ALBUMIN: CPT | Performed by: EMERGENCY MEDICINE

## 2022-10-03 PROCEDURE — 84484 ASSAY OF TROPONIN QUANT: CPT | Performed by: EMERGENCY MEDICINE

## 2022-10-03 PROCEDURE — 99284 EMERGENCY DEPT VISIT MOD MDM: CPT | Mod: 25 | Performed by: EMERGENCY MEDICINE

## 2022-10-03 PROCEDURE — 99285 EMERGENCY DEPT VISIT HI MDM: CPT | Mod: 25 | Performed by: EMERGENCY MEDICINE

## 2022-10-03 PROCEDURE — 93010 ELECTROCARDIOGRAM REPORT: CPT | Performed by: EMERGENCY MEDICINE

## 2022-10-03 ASSESSMENT — ACTIVITIES OF DAILY LIVING (ADL): ADLS_ACUITY_SCORE: 37

## 2022-10-03 NOTE — ED PROVIDER NOTES
"  History     Chief Complaint   Patient presents with     Chest Pain     \"Chest pain ongoing for 2 years. It's been the same. It's gotten worse around this time.\"     HPI  Sadaf Ross is a 21 year old female with a past medical history of bipolar disorder, borderline personality disorder, chest pain, obesity, intellectual disability, syncope who presents to the emergency department with a chief complaint of chest pain.  It is located the center of her chest.  It has been present for 2 years, however it is gotten worse today.  It does not get worse with exertion or deep breaths.  She states that sometimes improves with ibuprofen or moving around.  She states she has been evaluated for this in the past and has had a negative work-up, she was advised to use ibuprofen to treat the symptoms.    The patient has no mental health concerns today, she states she is at her baseline and doing relatively well with this at the moment, she states she has a new care plan with additional coping mechanisms that are working for her.     I have reviewed the Medications, Allergies, Past Medical and Surgical History, and Social History in the [employer] system.    Past Medical History:   Diagnosis Date     ADHD (attention deficit hyperactivity disorder)      Bipolar 1 disorder (H)      Borderline personality disorder (H)      Depression      Depressive disorder      Intellectual disability      Obesity      Syncope      Past Surgical History:   Procedure Laterality Date     APPENDECTOMY       APPENDECTOMY       No current facility-administered medications for this encounter.     Current Outpatient Medications   Medication     acetaminophen (TYLENOL) 325 MG tablet     alum & mag hydroxide-simethicone (MAALOX  ES) 400-400-40 MG/5ML SUSP suspension     ARIPiprazole lauroxil ER (ARISTADA) 882 MG/3.2ML intra-muscular     benztropine (COGENTIN) 1 MG tablet     calcium carbonate (TUMS) 500 MG chewable tablet     chlorhexidine (PERIDEX) 0.12 % " solution     clobetasol (TEMOVATE) 0.05 % CREA cream     cyclobenzaprine (FLEXERIL) 10 MG tablet     diclofenac (VOLTAREN) 1 % topical gel     docusate sodium (COLACE) 50 MG capsule     fluticasone (FLONASE) 50 MCG/ACT nasal spray     guaiFENesin (ROBITUSSIN) 100 MG/5ML SYRP     hydrocortisone (CORTAID) 0.5 % external cream     hydrOXYzine (ATARAX) 50 MG tablet     hyoscyamine (LEVSIN/SL) 0.125 MG sublingual tablet     ibuprofen (ADVIL/MOTRIN) 400 MG tablet     loperamide (IMODIUM) 2 MG capsule     LORazepam (ATIVAN) 0.5 MG tablet     metoclopramide (REGLAN) 10 MG tablet     multivitamin, therapeutic (THERA-VIT) TABS tablet     omeprazole (PRILOSEC) 40 MG DR capsule     ondansetron (ZOFRAN) 4 MG tablet     polyethylene glycol (MIRALAX) 17 GM/Dose powder     prochlorperazine (COMPAZINE) 10 MG tablet     promethazine (PHENERGAN) 12.5 MG tablet     sennosides (SENOKOT) 8.6 MG tablet     traZODone (DESYREL) 50 MG tablet     venlafaxine (EFFEXOR-ER) 225 MG 24 hr tablet     Vitamin D, Cholecalciferol, 25 MCG (1000 UT) TABS     Allergies   Allergen Reactions     Penicillins Rash and Unknown     Past medical history, past surgical history, medications, and allergies were reviewed with the patient. Additional pertinent items: None    Social History     Socioeconomic History     Marital status: Single     Spouse name: Not on file     Number of children: Not on file     Years of education: Not on file     Highest education level: Not on file   Occupational History     Not on file   Tobacco Use     Smoking status: Current Every Day Smoker     Packs/day: 0.50     Years: 5.00     Pack years: 2.50     Types: Vaping Device     Smokeless tobacco: Never Used   Substance and Sexual Activity     Alcohol use: No     Drug use: No     Sexual activity: Yes     Partners: Female, Male     Birth control/protection: Pill   Other Topics Concern     Not on file   Social History Narrative     Not on file     Social Determinants of Health  "    Financial Resource Strain: Not on file   Food Insecurity: Not on file   Transportation Needs: Not on file   Physical Activity: Not on file   Stress: Not on file   Social Connections: Not on file   Intimate Partner Violence: Not on file   Housing Stability: Not on file     Social history was reviewed with the patient. Additional pertinent items: None    Review of Systems  General: No fevers or chills  Skin: No rash or diaphoresis  Eyes: No eye redness or discharge  Ears/Nose/Throat: No rhinorrhea or nasal congestion  Respiratory: No cough or SOB  Cardiovascular: Positive for chest pain, no  palpitations  Gastrointestinal: No nausea, vomiting, or diarrhea  Genitourinary: No urinary frequency, hematuria, or dysuria  Musculoskeletal: No arthralgias or myalgias  Neurologic: No numbness or weakness  Hematologic/Lymphatic/Immunologic: No leg swelling, no easy bruising/bleeding  Endocrine: No polyuria/polydypsia    A complete review of systems was performed with pertinent positives and negatives noted in the HPI, and all other systems negative.    Physical Exam   BP: 126/83  Pulse: 115  Temp: 97.9  F (36.6  C)  Resp: 16  Height: 162.6 cm (5' 4\")  Weight: 106.4 kg (234 lb 9.6 oz)  SpO2: 98 %      General: Well nourished, well developed, NAD  HEENT: EOMI, anicteric. NCAT, MMM  Neck: no jugular venous distension, supple, nl ROM  Cardiac: Regular rate and rhythm. No murmurs, rubs, or gallops. Normal S1, S2.  Intact peripheral pulses  Pulm: CTAB, no stridor, wheezes, rales, rhonchi  Abd: Soft, nontender, nondistended.  No masses palpated.    Skin: Warm and dry to the touch.  No rash  Extremities: No LE edema, no cyanosis, w/w/p, no posterior calf tenderness  Neuro: A&Ox3, no gross focal deficits    ED Course        Procedures               EKG Interpretation:      Interpreted by Tori Whalen MD  Time reviewed:1807   Symptoms at time of EKG: chest pain    Rhythm: normal sinus   Rate: normal, 90 bpm  Axis: " NORMAL  Ectopy: none  Conduction: normal  ST Segments/ T Waves: No ST-T wave changes  Q Waves: none  Comparison to prior: Unchanged from 9/21/22    Clinical Impression: normal EKG                  Labs Ordered and Resulted from Time of ED Arrival to Time of ED Departure   COMPREHENSIVE METABOLIC PANEL - Abnormal       Result Value    Sodium 139      Potassium 3.7      Chloride 111 (*)     Carbon Dioxide (CO2) 19 (*)     Anion Gap 9      Urea Nitrogen 15      Creatinine 0.65      Calcium 9.4      Glucose 99      Alkaline Phosphatase 66      AST 12      ALT 26      Protein Total 8.1      Albumin 4.4      Bilirubin Total 0.4      GFR Estimate >90     CBC WITH PLATELETS AND DIFFERENTIAL - Abnormal    WBC Count 11.8 (*)     RBC Count 4.62      Hemoglobin 13.6      Hematocrit 38.3      MCV 83      MCH 29.4      MCHC 35.5      RDW 13.0      Platelet Count 246      % Neutrophils 63      % Lymphocytes 29      % Monocytes 5      % Eosinophils 2      % Basophils 1      % Immature Granulocytes 0      NRBCs per 100 WBC 0      Absolute Neutrophils 7.4      Absolute Lymphocytes 3.4      Absolute Monocytes 0.6      Absolute Eosinophils 0.3      Absolute Basophils 0.1      Absolute Immature Granulocytes 0.0      Absolute NRBCs 0.0     TROPONIN I - Normal    Troponin I High Sensitivity 19     HCG QUALITATIVE PREGNANCY - Normal    hCG Serum Qualitative Negative              Results for orders placed or performed during the hospital encounter of 10/03/22 (from the past 24 hour(s))   CBC with platelets differential    Narrative    The following orders were created for panel order CBC with platelets differential.  Procedure                               Abnormality         Status                     ---------                               -----------         ------                     CBC with platelets and d...[[id number]]  Abnormal            Final result                 Please view results for these tests on the individual orders.    Comprehensive metabolic panel   Result Value Ref Range    Sodium 139 133 - 144 mmol/L    Potassium 3.7 3.4 - 5.3 mmol/L    Chloride 111 (H) 94 - 109 mmol/L    Carbon Dioxide (CO2) 19 (L) 20 - 32 mmol/L    Anion Gap 9 3 - 14 mmol/L    Urea Nitrogen 15 7 - 30 mg/dL    Creatinine 0.65 0.52 - 1.04 mg/dL    Calcium 9.4 8.5 - 10.1 mg/dL    Glucose 99 70 - 99 mg/dL    Alkaline Phosphatase 66 40 - 150 U/L    AST 12 0 - 45 U/L    ALT 26 0 - 50 U/L    Protein Total 8.1 6.8 - 8.8 g/dL    Albumin 4.4 3.4 - 5.0 g/dL    Bilirubin Total 0.4 0.2 - 1.3 mg/dL    GFR Estimate >90 >60 mL/min/1.73m2   Troponin I   Result Value Ref Range    Troponin I High Sensitivity 19 <54 ng/L   HCG qualitative Blood   Result Value Ref Range    hCG Serum Qualitative Negative Negative   CBC with platelets and differential   Result Value Ref Range    WBC Count 11.8 (H) 4.0 - 11.0 10e3/uL    RBC Count 4.62 3.80 - 5.20 10e6/uL    Hemoglobin 13.6 11.7 - 15.7 g/dL    Hematocrit 38.3 35.0 - 47.0 %    MCV 83 78 - 100 fL    MCH 29.4 26.5 - 33.0 pg    MCHC 35.5 31.5 - 36.5 g/dL    RDW 13.0 10.0 - 15.0 %    Platelet Count 246 150 - 450 10e3/uL    % Neutrophils 63 %    % Lymphocytes 29 %    % Monocytes 5 %    % Eosinophils 2 %    % Basophils 1 %    % Immature Granulocytes 0 %    NRBCs per 100 WBC 0 <1 /100    Absolute Neutrophils 7.4 1.6 - 8.3 10e3/uL    Absolute Lymphocytes 3.4 0.8 - 5.3 10e3/uL    Absolute Monocytes 0.6 0.0 - 1.3 10e3/uL    Absolute Eosinophils 0.3 0.0 - 0.7 10e3/uL    Absolute Basophils 0.1 0.0 - 0.2 10e3/uL    Absolute Immature Granulocytes 0.0 <=0.4 10e3/uL    Absolute NRBCs 0.0 10e3/uL   XR Chest 2 Views    Narrative    EXAM: XR CHEST 2 VIEWS  LOCATION: [facility]  DATE/TIME: 10/3/2022 7:37 PM    INDICATION: Chest pain  COMPARISON: 09/18/2022      Impression    IMPRESSION: Negative chest.       Labs, vital signs, and imaging studies were reviewed by me.    Medications - No data to  display    Assessments & Plan (with Medical Decision Making)   Sadaf Ross is a 21 year old female who presents to the emergency department with chief complaint of chest pain.  Differential diagnosis includes musculoskeletal chest wall pain, anxiety, pneumonia, viral syndrome.  No recent trauma reported.  Labs, EKG, chest x-ray ordered to further evaluate the patient.    EKG is normal    Chest x-ray is negative.    Laboratory work-up is remarkable for troponin within normal limits    I have reviewed the nursing notes.    I have reviewed the findings, diagnosis, plan and need for follow up with the patient.    Patient to be discharged home. Advised to follow up with PCP within 1 week. To return to ER immediately with any new/worsening symptoms. Plan of care discussed with patient who expresses understanding and agrees with plan of care.    New Prescriptions    No medications on file       Final diagnoses:   Acute chest pain     Tori Whalen MD  10/3/2022   [facility] EMERGENCY DEPARTMENT     Tori Whalen MD  10/04/22 0152

## 2022-10-03 NOTE — ED TRIAGE NOTES
Triage Assessment     Row Name 10/03/22 1734       Triage Assessment (Adult)    Airway WDL WDL       Respiratory WDL    Respiratory WDL WDL

## 2022-10-03 NOTE — TELEPHONE ENCOUNTER
Patient calling back about this   States she lost a card that is not hers   Clarified that she lost someone's credit card and isn't feeling well     See below - c/o chest pain   Asked pt if she has someone to take her in   She'll try to call her transportation to get in   Said last time she called 911 she got in trouble     Told her to callback if needed  Alicia BLACK RN

## 2022-10-04 ENCOUNTER — NURSE TRIAGE (OUTPATIENT)
Dept: NURSING | Facility: CLINIC | Age: 22
End: 2022-10-04

## 2022-10-04 LAB
ATRIAL RATE - MUSE: 90 BPM
DIASTOLIC BLOOD PRESSURE - MUSE: NORMAL MMHG
INTERPRETATION ECG - MUSE: NORMAL
P AXIS - MUSE: 46 DEGREES
PR INTERVAL - MUSE: 176 MS
QRS DURATION - MUSE: 90 MS
QT - MUSE: 352 MS
QTC - MUSE: 430 MS
R AXIS - MUSE: 27 DEGREES
SYSTOLIC BLOOD PRESSURE - MUSE: NORMAL MMHG
T AXIS - MUSE: 11 DEGREES
VENTRICULAR RATE- MUSE: 90 BPM

## 2022-10-04 NOTE — TELEPHONE ENCOUNTER
"Sadaf reports \"I have [facility]\"    When asked what she means by this determined that she has been a pt at [facility] due to ongoing GI problems - She vomits frequently.    Her PCP with [facility] is aware. Per pt, she states that her doctor doesn't know what to do.    Call ended by pt when asked what prompted her to call the Nurse Triage line today    Amarilis Woods RN  [facility]      Reason for Disposition    Caller hangs up    Protocols used: DIFFICULT CALLER-A-AH      "

## 2022-10-04 NOTE — DISCHARGE INSTRUCTIONS
TODAY'S VISIT:  You were seen today for chest pain     - continue with ibuprofen as needed    - Applying heat to the painful area may help alleviate pain by helping with muscle relaxation. For this you can use a heating pad or warm bath/shower. If you do not have a heating pad, you can make a warm compress using an old sock filled with uncooked rice placed in the microwave for 1-2 minutes.   -   - If you had any labs or imaging/radiology tests performed today, you should also discuss these tests with your usual provider.     FOLLOW-UP:  Please make an appointment to follow up with:  - Your Primary Care Provider. If you do not have a PCP, please call the Primary Care Center (phone: [phone] for an appointment    - Have your provider review the results from today's visit with you again to make sure no further follow-up or additional testing is needed based on those results.     RETURN TO THE EMERGENCY DEPARTMENT  Return to the Emergency Department at any time for any new or worsening symptoms or any concerns.

## 2022-10-05 ENCOUNTER — HOSPITAL ENCOUNTER (EMERGENCY)
Facility: CLINIC | Age: 22
Discharge: NURSING, REHAB OR LONG-TERM CARE | End: 2022-10-05
Attending: EMERGENCY MEDICINE | Admitting: EMERGENCY MEDICINE
Payer: MEDICARE

## 2022-10-05 ENCOUNTER — NURSE TRIAGE (OUTPATIENT)
Dept: NURSING | Facility: CLINIC | Age: 22
End: 2022-10-05

## 2022-10-05 VITALS
RESPIRATION RATE: 18 BRPM | TEMPERATURE: 98.5 F | DIASTOLIC BLOOD PRESSURE: 65 MMHG | OXYGEN SATURATION: 99 % | SYSTOLIC BLOOD PRESSURE: 116 MMHG | HEART RATE: 88 BPM

## 2022-10-05 DIAGNOSIS — F60.3 BORDERLINE PERSONALITY DISORDER (H): ICD-10-CM

## 2022-10-05 DIAGNOSIS — F79 INTELLECTUAL DISABILITY: ICD-10-CM

## 2022-10-05 DIAGNOSIS — Z86.59 HISTORY OF BIPOLAR DISORDER: ICD-10-CM

## 2022-10-05 PROCEDURE — 99282 EMERGENCY DEPT VISIT SF MDM: CPT | Performed by: EMERGENCY MEDICINE

## 2022-10-05 ASSESSMENT — ACTIVITIES OF DAILY LIVING (ADL): ADLS_ACUITY_SCORE: 37

## 2022-10-05 ASSESSMENT — ENCOUNTER SYMPTOMS
DYSPHORIC MOOD: 0
HALLUCINATIONS: 0
NERVOUS/ANXIOUS: 0

## 2022-10-05 NOTE — ED NOTES
Bed: [facility]  Expected date:   Expected time:   Means of arrival:   Comments:  N 735  22y F  hallucinating

## 2022-10-05 NOTE — ED TRIAGE NOTES
Triage Assessment     Row Name 10/05/22 8496       Triage Assessment (Adult)    Airway WDL WDL       Respiratory WDL    Respiratory WDL WDL       Skin Circulation/Temperature WDL    Skin Circulation/Temperature WDL WDL       Cardiac WDL    Cardiac WDL WDL       Peripheral/Neurovascular WDL    Peripheral Neurovascular WDL WDL       Cognitive/Neuro/Behavioral WDL    Cognitive/Neuro/Behavioral WDL WDL

## 2022-10-05 NOTE — DISCHARGE INSTRUCTIONS
Return to your group home.  Keep working on your coping skills.  Follow up with your outpatient providers.

## 2022-10-05 NOTE — ED PROVIDER NOTES
ED Provider Note  [facility]      History     Chief Complaint   Patient presents with     Suicidal     HPI  Sadaf Ross is a 22 year old female with hx of intellectual disability, BPD, bipolar disorder who comes to the ER via ems due to feeling upset and suicidal.  She lives at a group home.  She comes frequently to the ER. She calls EMS on her own all of the time.   She is her own legal guardian.  EMS personnel are suppose to check with the group home before taking her to the hospital but they did not do that today.  She says she woke up in a bad mood and it just got to her today.  Group home staff say she was in a good mood and went out for haircut and nails today.  Then afterwards she called ems for unknown reason. The patient says she used her coping skills such as listening to music.  Group home personnel say she likes to come to the ER and help out around here.  The patient says she feels better now and is ready to go back to the group home.  She likes the staff and her roommates there.  Group home staff also says she complains of chest pain so that ems will respond.     Past Medical History  Past Medical History:   Diagnosis Date     ADHD (attention deficit hyperactivity disorder)      Bipolar 1 disorder (H)      Borderline personality disorder (H)      Depression      Depressive disorder      Intellectual disability      Obesity      Syncope      Past Surgical History:   Procedure Laterality Date     APPENDECTOMY       APPENDECTOMY       acetaminophen (TYLENOL) 325 MG tablet  alum & mag hydroxide-simethicone (MAALOX  ES) 400-400-40 MG/5ML SUSP suspension  ARIPiprazole lauroxil ER (ARISTADA) 882 MG/3.2ML intra-muscular  benztropine (COGENTIN) 1 MG tablet  calcium carbonate (TUMS) 500 MG chewable tablet  chlorhexidine (PERIDEX) 0.12 % solution  clobetasol (TEMOVATE) 0.05 % CREA cream  cyclobenzaprine (FLEXERIL) 10 MG tablet  diclofenac (VOLTAREN) 1 % topical gel  docusate  sodium (COLACE) 50 MG capsule  fluticasone (FLONASE) 50 MCG/ACT nasal spray  guaiFENesin (ROBITUSSIN) 100 MG/5ML SYRP  hydrocortisone (CORTAID) 0.5 % external cream  hydrOXYzine (ATARAX) 50 MG tablet  hyoscyamine (LEVSIN/SL) 0.125 MG sublingual tablet  ibuprofen (ADVIL/MOTRIN) 400 MG tablet  loperamide (IMODIUM) 2 MG capsule  LORazepam (ATIVAN) 0.5 MG tablet  metoclopramide (REGLAN) 10 MG tablet  multivitamin, therapeutic (THERA-VIT) TABS tablet  omeprazole (PRILOSEC) 40 MG DR capsule  ondansetron (ZOFRAN) 4 MG tablet  polyethylene glycol (MIRALAX) 17 GM/Dose powder  prochlorperazine (COMPAZINE) 10 MG tablet  promethazine (PHENERGAN) 12.5 MG tablet  sennosides (SENOKOT) 8.6 MG tablet  traZODone (DESYREL) 50 MG tablet  venlafaxine (EFFEXOR-ER) 225 MG 24 hr tablet  Vitamin D, Cholecalciferol, 25 MCG (1000 UT) TABS      Allergies   Allergen Reactions     Penicillins Rash and Unknown     Family History  Family History   Problem Relation Age of Onset     Diabetes Type 1 Father      Cancer Paternal Grandfather      Social History   Social History     Tobacco Use     Smoking status: Current Every Day Smoker     Packs/day: 0.50     Years: 5.00     Pack years: 2.50     Types: Vaping Device     Smokeless tobacco: Never Used   Substance Use Topics     Alcohol use: No     Drug use: No      Past medical history, past surgical history, medications, allergies, family history, and social history were reviewed with the patient. No additional pertinent items.       Review of Systems   Psychiatric/Behavioral: Negative for dysphoric mood, hallucinations, self-injury and suicidal ideas. The patient is not nervous/anxious.    All other systems reviewed and are negative.    A complete review of systems was performed with pertinent positives and negatives noted in the HPI, and all other systems negative.    Physical Exam   BP: 116/65  Pulse: 90  Temp: 98.5  F (36.9  C)  Resp: 18  SpO2: 100 %  Physical Exam  Vitals and nursing note  reviewed.   Constitutional:       General: She is not in acute distress.     Appearance: She is obese. She is not ill-appearing, toxic-appearing or diaphoretic.      Comments: Calmly sitting in the chair.  Good eye contact. Well groomed.    HENT:      Head: Normocephalic and atraumatic.      Right Ear: External ear normal.      Left Ear: External ear normal.      Nose: No congestion or rhinorrhea.      Mouth/Throat:      Mouth: Mucous membranes are moist.   Eyes:      Extraocular Movements: Extraocular movements intact.   Cardiovascular:      Rate and Rhythm: Normal rate.   Pulmonary:      Effort: Pulmonary effort is normal.   Musculoskeletal:         General: No deformity or signs of injury.      Cervical back: Normal range of motion.   Skin:     General: Skin is warm and dry.      Coloration: Skin is not jaundiced or pale.   Neurological:      General: No focal deficit present.      Mental Status: She is alert and oriented to person, place, and time.   Psychiatric:         Attention and Perception: Attention and perception normal.         Mood and Affect: Mood and affect normal.         Speech: Speech normal.         Behavior: Behavior normal. Behavior is cooperative.         Thought Content: Thought content normal.         Cognition and Memory: Memory normal. Cognition is impaired.         Judgment: Judgment is impulsive and inappropriate.         ED Course      Procedures       The medical record was reviewed and interpreted.  Mental Health Risk Assessment      PSS-3    Date and Time Over the past 2 weeks have you felt down, depressed, or hopeless? Over the past 2 weeks have you had thoughts of killing yourself? Have you ever attempted to kill yourself? When did this last happen? User   10/05/22 1815 yes yes yes -- MM      C-SSRS ([place])    Date and Time Q1 Wished to be Dead (Past Month) Q2 Suicidal Thoughts (Past Month) Q3 Suicidal Thought Method Q4 Suicidal Intent without Specific Plan Q5 Suicide Intent  with Specific Plan Q6 Suicide Behavior (Lifetime) Within the Past 3 Months? RETIRED: Level of Risk per Screen Screening Not Complete User   10/05/22 1815 yes yes yes no yes yes -- -- -- MM                Item Assessment   Suicidal Ideation None   Plan none   Intent none   Suicidal or self-harm behaviors none   Risk Factors Previous psychiatric diagnosis and treatments   Protective Factors likes group home staff and roommates              No results found for any visits on 10/05/22.  Medications - No data to display     Assessments & Plan (with Medical Decision Making)   Sadaf Ross is a 22 year old female with hx of intellectual disability, BPD, bipolar disorder who comes to the ER via ems due to feeling upset and suicidal.  She calls ems herself and comes to the ER frequently.  [facility] staff say they are working with EMS to come and check with them before bringing her to the ER but the paramedics didn't do that today.  The patient has no specific trigger.  She is at baseline and tells me she is ready to return to her group home. Group home staff agree with her return.  No si/hi/hallucinations/sib.     I have reviewed the nursing notes. I have reviewed the findings, diagnosis, plan and need for follow up with the patient.    New Prescriptions    No medications on file       Final diagnoses:   Borderline personality disorder (H)   Intellectual disability   History of bipolar disorder       --  Ashely Toth MD  [facility] EMERGENCY DEPARTMENT  10/5/2022     Ashely Toth MD  10/05/22 1840       Ashely Toth MD  10/05/22 1843

## 2022-10-05 NOTE — TELEPHONE ENCOUNTER
Patient has been having chest pain all day.  Patient states it is this sharp pain that is 9/10.  She states she has some shortness of breath, but not struggling, no sweating, no feeling clammy or weak, no heart rate changes.    Care advise: go to ED for evaluation. Patient has a ride.  Advise not to take anything by mouth until she is seen.  If she starts struggling to breath or passes out to call 911.    Vanda Borjas RN   10/05/22 4:33 PM  [facility] Nurse Advisor    Reason for Disposition    SEVERE chest pain    Additional Information    Negative: SEVERE difficulty breathing (e.g., struggling for each breath, speaks in single words)    Negative: Difficult to awaken or acting confused (e.g., disoriented, slurred speech)    Negative: Shock suspected (e.g., cold/pale/clammy skin, too weak to stand, low BP, rapid pulse)    Negative: Passed out (i.e., lost consciousness, collapsed and was not responding)    Negative: [1] Chest pain lasts > 5 minutes AND [2] age > 44    Negative: [1] Chest pain lasts > 5 minutes AND [2] age > 30 AND [3] one or more cardiac risk factors (e.g., diabetes, high blood pressure, high cholesterol, smoker, or strong family history of heart disease)    Negative: [1] Chest pain lasts > 5 minutes AND [2] history of heart disease (i.e., angina, heart attack, heart failure, bypass surgery, takes nitroglycerin)    Negative: [1] Chest pain lasts > 5 minutes AND [2] described as crushing, pressure-like, or heavy    Negative: Heart beating < 50 beats per minute OR > 140 beats per minute    Negative: Visible sweat on face or sweat dripping down face    Negative: Sounds like a life-threatening emergency to the triager    Negative: Followed a chest injury    Protocols used: CHEST PAIN-A-

## 2022-10-06 ENCOUNTER — HOSPITAL ENCOUNTER (EMERGENCY)
Facility: CLINIC | Age: 22
Discharge: HOME | End: 2022-10-07
Attending: EMERGENCY MEDICINE | Admitting: EMERGENCY MEDICINE
Payer: MEDICARE

## 2022-10-06 ENCOUNTER — NURSE TRIAGE (OUTPATIENT)
Dept: NURSING | Facility: CLINIC | Age: 22
End: 2022-10-06

## 2022-10-06 VITALS
OXYGEN SATURATION: 98 % | RESPIRATION RATE: 16 BRPM | HEART RATE: 86 BPM | SYSTOLIC BLOOD PRESSURE: 129 MMHG | DIASTOLIC BLOOD PRESSURE: 82 MMHG | TEMPERATURE: 98.1 F

## 2022-10-06 DIAGNOSIS — F60.3 BORDERLINE PERSONALITY DISORDER (H): ICD-10-CM

## 2022-10-06 DIAGNOSIS — F41.9 ANXIETY: ICD-10-CM

## 2022-10-06 PROCEDURE — 99283 EMERGENCY DEPT VISIT LOW MDM: CPT | Performed by: EMERGENCY MEDICINE

## 2022-10-06 PROCEDURE — 99284 EMERGENCY DEPT VISIT MOD MDM: CPT | Performed by: EMERGENCY MEDICINE

## 2022-10-06 ASSESSMENT — ACTIVITIES OF DAILY LIVING (ADL): ADLS_ACUITY_SCORE: 37

## 2022-10-06 NOTE — ED NOTES
Nursing report handoff now from [facility] to [facility].

## 2022-10-06 NOTE — TELEPHONE ENCOUNTER
Patient vomited 2 times today at 1 pm.     She states she has no fever.  She states she has abdominal pain since 7 am- constant.  She has nausea every time she drinks fluids.  Patient states she was seen by her primary care provider today and did not talk to them about her abdominal pain.    Per protocol patient advised to go to the Urgent Care.  Mercy Stevenson RN on 10/6/2022 at 2:18 PM      Reason for Disposition    Constant abdominal pain lasting > 2 hours    Additional Information    Negative: Shock suspected (e.g., cold/pale/clammy skin, too weak to stand, low BP, rapid pulse)    Negative: Difficult to awaken or acting confused (e.g., disoriented, slurred speech)    Negative: Sounds like a life-threatening emergency to the triager    Negative: Vomiting occurs only while coughing    Negative: Pregnant < 20 Weeks and nausea/vomiting began in early pregnancy (i.e., 4-8 weeks pregnant)    Negative: Chest pain    Negative: Headache is main symptom    Negative: Vomiting red blood or black (coffee ground) material    Negative: Vomiting and hernia is more painful or swollen than usual    Negative: Recent head injury (within 3 days)    Negative: Recent abdominal injury (within 7 days)    Negative: Insulin-dependent diabetes and glucose > 240 mg/dL (13 mmol/L)    Negative: Severe pain in one eye    Negative: SEVERE vomiting (e.g., 6 or more times/day)  (Exception: Patient sounds well, is drinking liquids, does not sound dehydrated, and vomiting has lasted less than 24 hours.)    Negative: MODERATE vomiting (e.g., 3 - 5 times/day) and age > 60 years    Protocols used: VOMITING-A-OH

## 2022-10-07 ENCOUNTER — NURSE TRIAGE (OUTPATIENT)
Dept: NURSING | Facility: CLINIC | Age: 22
End: 2022-10-07

## 2022-10-07 PROCEDURE — 250N000013 HC RX MED GY IP 250 OP 250 PS 637: Performed by: EMERGENCY MEDICINE

## 2022-10-07 RX ORDER — OLANZAPINE 10 MG/1
10 TABLET, ORALLY DISINTEGRATING ORAL ONCE
Status: COMPLETED | OUTPATIENT
Start: 2022-10-07 | End: 2022-10-07

## 2022-10-07 RX ADMIN — OLANZAPINE 10 MG: 10 TABLET, ORALLY DISINTEGRATING ORAL at 00:29

## 2022-10-07 ASSESSMENT — ENCOUNTER SYMPTOMS
FEVER: 0
COUGH: 0
NERVOUS/ANXIOUS: 1
DYSURIA: 0
SORE THROAT: 0
CONSTIPATION: 0
DIFFICULTY URINATING: 0
HEADACHES: 0
BACK PAIN: 0
VOMITING: 1
NECK PAIN: 0
SHORTNESS OF BREATH: 1
DIARRHEA: 0
NAUSEA: 1
EYE REDNESS: 0
SLEEP DISTURBANCE: 0

## 2022-10-07 ASSESSMENT — ACTIVITIES OF DAILY LIVING (ADL): ADLS_ACUITY_SCORE: 37

## 2022-10-07 NOTE — TELEPHONE ENCOUNTER
"Pt reports chest pain and trouble breathing \"just now it woke me up feeling very cold\". Pt reports pain is \"sharp and heavy in middle of chest\". Pt states her symptoms are worse than when she was in the ER three days ago and \"wheezing a little bit\". Pt sounds slightly anxious and answers in mostly one word responses however is able to complete full sentences. Writer unable to hear any wheezing. Pt reports she did tell group home staff about her symptoms and they told her to \"go in if you have to\".     Advised pt to have another adult drive her to the ER now per protocol. Call 911 if symptoms becomes severe.    Pt verbalizes understanding and agrees to plan.       Reason for Disposition    Chest pain    Difficulty breathing    Protocols used: BREATHING DIFFICULTY-A-AH, CHEST PAIN-A-AH      "

## 2022-10-07 NOTE — TELEPHONE ENCOUNTER
Sadaf calls and says that she has vomited 13 times today. Pt. Says that her vomiting began this katerina, around 5:19 pm. Vomiting began out of the blue, per pt. Pt. Says that she also has middle abdominal pain. Pain = 9/10. Pt. Says that she lives in a group home-[facility]-in [place]. Pt. Says that the staff at the group home tell pt. to just go to sleep. Pt. Says that when she goes to the hospital, the Drs. Cannot find what is wrong with pt. Pt. Says that she is not going to call 911, but will call her transport Service and will go to the [facility] ER now.  COVID 19 Nurse Triage Plan/Patient Instructions    Please be aware that novel coronavirus (COVID-19) may be circulating in the community. If you develop symptoms such as fever, cough, or SOB or if you have concerns about the presence of another infection including coronavirus (COVID-19), please contact your health care provider or visit [email or web address].     Disposition/Instructions    Call to EMS/911 recommended. Follow protocol based instructions.     Bring Your Own Device:  Please also bring your smart device(s) (smart phones, tablets, laptops) and their charging cables for your personal use and to communicate with your care team during your visit.    Thank you for taking steps to prevent the spread of this virus.  o Limit your contact with others.  o Wear a simple mask to cover your cough.  o Wash your hands well and often.    Resources    [facility]: About COVID-19: [email or web address]    CDC: What to Do If You're Sick: www.cdc.gov/coronavirus/2019-ncov/about/steps-when-sick.html    CDC: Ending Home Isolation: www.cdc.gov/coronavirus/2019-ncov/hcp/disposition-in-home-patients.html     CDC: Caring for Someone: www.cdc.gov/coronavirus/2019-ncov/if-you-are-sick/care-for-someone.html     MD: Interim Guidance for Hospital Discharge to Home:  [email or web address]/diseases/coronavirus/hcp/hospdischarge.pdf    [facility] clinical trials (COVID-19 research studies): [email or web address]     Below are the COVID-19 hotlines at the [place] Department of Health ([facility]). Interpreters are available.   o For health questions: Call [phone] or 1-747.919.5977 (7 a.m. to 7 p.m.)  o For questions about schools and childcare: Call 711-571-0706 or 1-375.126.5996 (7 a.m. to 7 p.m.)                     Reason for Disposition    Chest pain    [1] Chest pain lasts > 5 minutes AND [2] described as crushing, pressure-like, or heavy    Additional Information    Negative: Shock suspected (e.g., cold/pale/clammy skin, too weak to stand, low BP, rapid pulse)    Negative: Difficult to awaken or acting confused (e.g., disoriented, slurred speech)    Negative: Sounds like a life-threatening emergency to the triager    Negative: Vomiting occurs only while coughing    Negative: [1] Pregnant < 20 Weeks AND [2] nausea/vomiting began in early pregnancy (i.e., 4-8 weeks pregnant)    Negative: SEVERE difficulty breathing (e.g., struggling for each breath, speaks in single words)    Negative: Difficult to awaken or acting confused (e.g., disoriented, slurred speech)    Negative: Shock suspected (e.g., cold/pale/clammy skin, too weak to stand, low BP, rapid pulse)    Negative: Passed out (i.e., lost consciousness, collapsed and was not responding)    Negative: [1] Chest pain lasts > 5 minutes AND [2] age > 44    Negative: [1] Chest pain lasts > 5 minutes AND [2] age > 30 AND [3] one or more cardiac risk factors (e.g., diabetes, high blood pressure, high cholesterol, smoker, or strong family history of heart disease)    Negative: [1] Chest pain lasts > 5 minutes AND [2] history of heart disease (i.e., angina, heart attack, heart failure, bypass surgery, takes nitroglycerin)    Protocols used: VOMITING-A-AH, CHEST PAIN-A-AH

## 2022-10-07 NOTE — ED NOTES
Bed: [facility]  Expected date:   Expected time:   Means of arrival:   Comments:  733 to triage  22F with anxiety   17 min out

## 2022-10-07 NOTE — ED TRIAGE NOTES
Triage Assessment     Row Name 10/06/22 2241       Triage Assessment (Adult)    Airway WDL WDL       Respiratory WDL    Respiratory WDL WDL       Skin Circulation/Temperature WDL    Skin Circulation/Temperature WDL WDL       Cardiac WDL    Cardiac WDL WDL       Peripheral/Neurovascular WDL    Peripheral Neurovascular WDL WDL       Cognitive/Neuro/Behavioral WDL    Cognitive/Neuro/Behavioral WDL WDL    Row Name 10/06/22 2213       Triage Assessment (Adult)    Airway WDL WDL       Respiratory WDL    Respiratory WDL WDL       Skin Circulation/Temperature WDL    Skin Circulation/Temperature WDL WDL       Cardiac WDL    Cardiac WDL WDL       Peripheral/Neurovascular WDL    Peripheral Neurovascular WDL WDL       Cognitive/Neuro/Behavioral WDL    Cognitive/Neuro/Behavioral WDL WDL

## 2022-10-07 NOTE — TELEPHONE ENCOUNTER
"Pt calls to report she has nausea and vomiting. Pt does not sound in acute distress and was evaluated in ER within the past 24 hours for multiple issues including nasea and vomiting. Pt gave permission to speak with her group home staff member Johann who states several times pt \"has been to clinic twice and they have not helped her, she won't take the medication\". Despite asking several times Johann will not tell Writer what medication pt is refusing to take. Writer has difficulty understanding Johann due to strong accent.     Phone call ended by Johann.       Reason for Disposition    Caller hangs up    Protocols used: DIFFICULT CALLER-A-AH      "

## 2022-10-07 NOTE — ED PROVIDER NOTES
ED Provider Note  [facility]      History     Chief Complaint   Patient presents with     Anxiety     Suicidal     HPI  Sadaf Ross is a 22 year old female female with history of borderline personality disorder, bipolar disorder, anxiety, intellectual disability, chronic chest pain, and chronic abdominal pain to the emergency department via EMS with anxiety.  Patient states that she has been feeling anxious today.  She states that she was nauseous and vomited earlier today.  She complains of diffuse abdominal pain which is typical for her.  She denies any diarrhea or constipation.  She denies any dysuria, urgency, or frequency.  Patient also complains of shortness of breath.  She denies any fever or cough.  She has chronic chest pain which is unchanged today.  Patient denies any leg pain or swelling.  She is not sure if she is due for any of her nighttime medications.      Past Medical History  Past Medical History:   Diagnosis Date     ADHD (attention deficit hyperactivity disorder)      Bipolar 1 disorder (H)      Borderline personality disorder (H)      Depression      Depressive disorder      Intellectual disability      Obesity      Syncope      Past Surgical History:   Procedure Laterality Date     APPENDECTOMY       APPENDECTOMY       acetaminophen (TYLENOL) 325 MG tablet  alum & mag hydroxide-simethicone (MAALOX  ES) 400-400-40 MG/5ML SUSP suspension  ARIPiprazole lauroxil ER (ARISTADA) 882 MG/3.2ML intra-muscular  benztropine (COGENTIN) 1 MG tablet  calcium carbonate (TUMS) 500 MG chewable tablet  chlorhexidine (PERIDEX) 0.12 % solution  clobetasol (TEMOVATE) 0.05 % CREA cream  cyclobenzaprine (FLEXERIL) 10 MG tablet  diclofenac (VOLTAREN) 1 % topical gel  docusate sodium (COLACE) 50 MG capsule  fluticasone (FLONASE) 50 MCG/ACT nasal spray  guaiFENesin (ROBITUSSIN) 100 MG/5ML SYRP  hydrocortisone (CORTAID) 0.5 % external cream  hydrOXYzine (ATARAX) 50 MG tablet  hyoscyamine  (LEVSIN/SL) 0.125 MG sublingual tablet  ibuprofen (ADVIL/MOTRIN) 400 MG tablet  loperamide (IMODIUM) 2 MG capsule  LORazepam (ATIVAN) 0.5 MG tablet  metoclopramide (REGLAN) 10 MG tablet  multivitamin, therapeutic (THERA-VIT) TABS tablet  omeprazole (PRILOSEC) 40 MG DR capsule  ondansetron (ZOFRAN) 4 MG tablet  polyethylene glycol (MIRALAX) 17 GM/Dose powder  prochlorperazine (COMPAZINE) 10 MG tablet  promethazine (PHENERGAN) 12.5 MG tablet  sennosides (SENOKOT) 8.6 MG tablet  traZODone (DESYREL) 50 MG tablet  venlafaxine (EFFEXOR-ER) 225 MG 24 hr tablet  Vitamin D, Cholecalciferol, 25 MCG (1000 UT) TABS      Allergies   Allergen Reactions     Penicillins Rash and Unknown     Family History  Family History   Problem Relation Age of Onset     Diabetes Type 1 Father      Cancer Paternal Grandfather      Social History   Social History     Tobacco Use     Smoking status: Current Every Day Smoker     Packs/day: 0.50     Years: 5.00     Pack years: 2.50     Types: Vaping Device     Smokeless tobacco: Never Used   Substance Use Topics     Alcohol use: No     Drug use: No      Past medical history, past surgical history, medications, allergies, family history, and social history were reviewed with the patient. No additional pertinent items.       Review of Systems   Constitutional: Negative for fever.   HENT: Negative for sore throat.    Eyes: Negative for redness.   Respiratory: Positive for shortness of breath. Negative for cough.    Gastrointestinal: Positive for nausea and vomiting. Negative for constipation and diarrhea.   Genitourinary: Negative for difficulty urinating and dysuria.   Musculoskeletal: Negative for back pain and neck pain.   Skin: Negative for rash.   Neurological: Negative for headaches.   Psychiatric/Behavioral: Negative for sleep disturbance. The patient is nervous/anxious.    All other systems reviewed and are negative.    A complete review of systems was performed with pertinent positives and  negatives noted in the HPI, and all other systems negative.    Physical Exam   BP: 129/82  Pulse: 86  Temp: 98.1  F (36.7  C)  Resp: 16  SpO2: 98 %  Physical Exam  Vitals and nursing note reviewed.   Constitutional:       General: She is not in acute distress.     Appearance: Normal appearance. She is not diaphoretic.   HENT:      Head: Normocephalic and atraumatic.      Mouth/Throat:      Pharynx: No oropharyngeal exudate.   Eyes:      General: No scleral icterus.     Pupils: Pupils are equal, round, and reactive to light.   Cardiovascular:      Rate and Rhythm: Normal rate and regular rhythm.      Pulses: Normal pulses.      Heart sounds: Normal heart sounds.   Pulmonary:      Effort: Pulmonary effort is normal. No respiratory distress.      Breath sounds: Normal breath sounds.   Abdominal:      General: Bowel sounds are normal.      Palpations: Abdomen is soft.      Tenderness: There is no abdominal tenderness.   Musculoskeletal:         General: No tenderness. Normal range of motion.   Skin:     General: Skin is warm and dry.      Findings: No rash.   Neurological:      General: No focal deficit present.      Mental Status: She is alert.      Motor: No weakness.      Coordination: Coordination normal.      Gait: Gait normal.   Psychiatric:         Mood and Affect: Mood is anxious.         Speech: Speech normal.         Behavior: Behavior is cooperative.         Thought Content: Thought content does not include suicidal ideation.         ED Course      Procedures       The medical record was reviewed and interpreted.  Mental Health Risk Assessment      PSS-3    Date and Time Over the past 2 weeks have you felt down, depressed, or hopeless? Over the past 2 weeks have you had thoughts of killing yourself? Have you ever attempted to kill yourself? When did this last happen? User   10/06/22 2248 yes yes yes between 1 and 6 months ago       C-SSRS ([facility])    Date and Time Q1 Wished to be Dead (Past Month) Q2  Suicidal Thoughts (Past Month) Q3 Suicidal Thought Method Q4 Suicidal Intent without Specific Plan Q5 Suicide Intent with Specific Plan Q6 Suicide Behavior (Lifetime) Within the Past 3 Months? RETIRED: Level of Risk per Screen Screening Not Complete User   10/06/22 2241 yes yes yes yes yes yes -- -- -- JR                Item Assessment   Suicidal Ideation None   Plan none   Intent none   Suicidal or self-harm behaviors none   Risk Factors Previous psychiatric diagnosis and treatments   Protective Factors Identified reasons for living       Medications   OLANZapine zydis (zyPREXA) ODT tab 10 mg (has no administration in time range)           No results found for any visits on 10/06/22.  Medications   OLANZapine zydis (zyPREXA) ODT tab 10 mg (has no administration in time range)        Assessments & Plan (with Medical Decision Making)   22 year old female with history of bipolar disorder, borderline personality disorder, intellectual disability, anxiety, and frequent emergency department visits for the aforementioned problems as well as chest and abdominal pain to the emergency department with anxiety.  The patient appears anxious here in the emergency department.  She complains of chest pain which is a frequent and chronic complaint.  She complains of dyspnea but has normal oxygen saturations and clear lungs.  She complains of abdominal pain but her abdomen is soft and nontender.  She states she has he vomited earlier yesterday morning but not since.  She does not appear acutely ill.  She is received frequent previous evaluations for the aforementioned complaints with no significant etiology found.  Do not feel further diagnostic testing is indicated today.  Patient given Zyprexa for anxiety.  She will return to her group home.    I have reviewed the nursing notes. I have reviewed the findings, diagnosis, plan and need for follow up with the patient.    New Prescriptions    No medications on file       Final  diagnoses:   Anxiety   Borderline personality disorder (H)     Chart documentation was completed with Dragon voice-recognition software. Even though reviewed, this chart may still contain some grammatical, spelling, and word errors.     --  Ambrocio Ferro Md  [facility] EMERGENCY DEPARTMENT  10/6/2022     Ambrocio Ferro MD  10/07/22 0027

## 2022-10-09 ENCOUNTER — NURSE TRIAGE (OUTPATIENT)
Dept: NURSING | Facility: CLINIC | Age: 22
End: 2022-10-09

## 2022-10-09 ENCOUNTER — HOSPITAL ENCOUNTER (EMERGENCY)
Facility: CLINIC | Age: 22
Discharge: HOME | End: 2022-10-09
Attending: EMERGENCY MEDICINE | Admitting: EMERGENCY MEDICINE
Payer: MEDICARE

## 2022-10-09 VITALS
RESPIRATION RATE: 16 BRPM | SYSTOLIC BLOOD PRESSURE: 128 MMHG | HEART RATE: 91 BPM | OXYGEN SATURATION: 99 % | DIASTOLIC BLOOD PRESSURE: 83 MMHG | TEMPERATURE: 98.3 F

## 2022-10-09 DIAGNOSIS — R53.83 OTHER FATIGUE: ICD-10-CM

## 2022-10-09 PROCEDURE — 99283 EMERGENCY DEPT VISIT LOW MDM: CPT | Performed by: EMERGENCY MEDICINE

## 2022-10-09 PROCEDURE — 99282 EMERGENCY DEPT VISIT SF MDM: CPT | Performed by: EMERGENCY MEDICINE

## 2022-10-09 ASSESSMENT — ENCOUNTER SYMPTOMS
BRUISES/BLEEDS EASILY: 0
ADENOPATHY: 0
ARTHRALGIAS: 0
DIFFICULTY URINATING: 0
CONFUSION: 0
NECK STIFFNESS: 0
FATIGUE: 1
COLOR CHANGE: 0
NECK PAIN: 0
EYE REDNESS: 0
CHILLS: 0
HEADACHES: 0
POLYDIPSIA: 0
VOMITING: 0
SHORTNESS OF BREATH: 0
ABDOMINAL PAIN: 0
NAUSEA: 0
FEVER: 0
WEAKNESS: 0
LIGHT-HEADEDNESS: 0

## 2022-10-09 ASSESSMENT — ACTIVITIES OF DAILY LIVING (ADL): ADLS_ACUITY_SCORE: 37

## 2022-10-09 NOTE — TELEPHONE ENCOUNTER
"S-(situation): Pt initially calling about fatigue.    \"I feel so weak I don;t know what to do.\"    B-(background):   Pt has called [facility] 2x the past few days due to nausea, was advised to go to ED but did not come in.    A-(assessment):   Pt able to speak in complete sentences on the phone. Does not sound like she is experiencing any distress    Initially reports about fatigue. Vomited once this morning. Vomited 13 times yesterday.    Pt then reported 9/10 chest pain since she purged this AM. Sharp pain and constant.    Lightheaded and dizzy, still able to walk on her own.    No SOB    R-(recommendations): call 911. Pt will call.    Kizzy Campbell RN/[place] Nurse Advisor          Reason for Disposition    Chest pain    [1] Chest pain lasts > 5 minutes AND [2] described as crushing, pressure-like, or heavy    Additional Information    Negative: SEVERE difficulty breathing (e.g., struggling for each breath, speaks in single words)    Negative: Shock suspected (e.g., cold/pale/clammy skin, too weak to stand, low BP, rapid pulse)    Negative: Difficult to awaken or acting confused (e.g., disoriented, slurred speech)    Negative: [1] Fainted > 15 minutes ago AND [2] still feels too weak or dizzy to stand    Negative: [1] SEVERE weakness (i.e., unable to walk or barely able to walk, requires support) AND [2] new-onset or worsening    Negative: Sounds like a life-threatening emergency to the triager    Negative: SEVERE difficulty breathing (e.g., struggling for each breath, speaks in single words)    Negative: Difficult to awaken or acting confused (e.g., disoriented, slurred speech)    Negative: Shock suspected (e.g., cold/pale/clammy skin, too weak to stand, low BP, rapid pulse)    Negative: Passed out (i.e., lost consciousness, collapsed and was not responding)    Negative: [1] Chest pain lasts > 5 minutes AND [2] age > 44    Negative: [1] Chest pain lasts > 5 minutes AND [2] age > 30 AND [3] one or more cardiac risk " factors (e.g., diabetes, high blood pressure, high cholesterol, smoker, or strong family history of heart disease)    Negative: [1] Chest pain lasts > 5 minutes AND [2] history of heart disease (i.e., angina, heart attack, heart failure, bypass surgery, takes nitroglycerin)    Protocols used: WEAKNESS (GENERALIZED) AND FATIGUE-A-AH, CHEST PAIN-A-AH

## 2022-10-09 NOTE — DISCHARGE INSTRUCTIONS
Please make an appointment to follow up with Your Primary Care Provider in 1-2 days if you have any concerns.  If your symptoms recur please notify her group home staff for evaluation and possible return to the emergency department

## 2022-10-09 NOTE — ED NOTES
called and notified pt is up for discharge. Transport will be here in about 1 hour to get pt and bring her back to the .

## 2022-10-09 NOTE — TELEPHONE ENCOUNTER
"Nurse Triage SBAR    Is this a 2nd Level Triage? NO    Situation: Patient calling with suicidal ideation.  Consent: not needed    Background:  Pt states \"I still feel fatigue\" and states she wants to overdose by \"popping pills\".      Assessment:   * Pt states she still feels fatigue and has been since 6am today.   * Pt states she wishes she were dead and could go to sleep and not wake up.   * Pt states she has thought of cutting herself and notes she had cut herself in the past - noting the last time was 3 months ago.   * Pt states she has no specific plan right now but notes she wants to take pills and overdose.    * Pt states she has tried to harm herself recently by biting herself on her hands.    * Pt denies ever starting the process to kill herself.   * Pt states that things \"have been okay not the best\".    * Pt notes nobody is with her in the garage but notes the staff is in the house.    * Pt denies she does not have her pills with her but states \"I wish I did\".    * Pt states that she has access to ibuprofen and tylenol but that her staff has all the rest of her medications.    * [facility]  States she is seeing a counselor - Gt.   * Denies drinking alcohol or using any illegal drugs.   * Denies fever - but states she has body aches and the chills    Pt states she has a migraine headache and has had them before but never this bad.      Protocol Recommended Disposition:   911     Recommendation: Advised patient to Call 911 . Reviewed concerning symptoms and when to call back. Writer called 911 for pt at this time while pt remained in her room getting ready.  Pt notes she had already been to ED earlier this morning and states she wasn't seen for suicidal thoughts today but those thoughts began after her discharge today.   asked this pt why she was calling and pt states \"I'm having suicidal ideations\".   Pt continues to state she feels anxious but does state she is okay without writer waiting " on phone.  Pt states it often takes this long for the ambulance to arrive.       Sharon Perez RN [place] [facility] 10/9/2022 5:42 PM      Reason for Disposition    Patient is threatening suicide now    Additional Information    Negative: Patient attempted suicide    Protocols used: SUICIDE [id number]

## 2022-10-09 NOTE — ED NOTES
Pt told RN and 1-1 staff that after talking for awhile with staff she no longer felt like she needed to be seen in the ED today. Patient stated she is ready to go home.

## 2022-10-09 NOTE — ED PROVIDER NOTES
ED Provider Note  [facility] EMERGENCY DEPARTMENT ([facility])    10/09/22          History     Chief Complaint   Patient presents with     Fatigue     today     HPI  Sadaf Ross is a 22 year old female with a past medical history including intellectual disability, borderline personality disorder, bipolar 1 disorder, who is well-known to the ED with many frequent ED visits who presents to the ED today feeling fatigued.  She states that she felt fatigued earlier in the morning and decided to come here for evaluation.  However, at this point she denies any symptoms and states that she is at her baseline and she does not feel fatigued anymore.  She denies chest pain, shortness of breath, headache, nausea/vomiting/abdominal pain.  No fevers.  She would like to go back to her group home.  She is not suicidal or homicidal.  She denies hallucinations.  She denies feeling depressed.    Past Medical History  Past Medical History:   Diagnosis Date     ADHD (attention deficit hyperactivity disorder)      Bipolar 1 disorder (H)      Borderline personality disorder (H)      Depression      Depressive disorder      Intellectual disability      Obesity      Syncope      Past Surgical History:   Procedure Laterality Date     APPENDECTOMY       APPENDECTOMY       acetaminophen (TYLENOL) 325 MG tablet  alum & mag hydroxide-simethicone (MAALOX  ES) 400-400-40 MG/5ML SUSP suspension  ARIPiprazole lauroxil ER (ARISTADA) 882 MG/3.2ML intra-muscular  benztropine (COGENTIN) 1 MG tablet  calcium carbonate (TUMS) 500 MG chewable tablet  chlorhexidine (PERIDEX) 0.12 % solution  clobetasol (TEMOVATE) 0.05 % CREA cream  cyclobenzaprine (FLEXERIL) 10 MG tablet  diclofenac (VOLTAREN) 1 % topical gel  docusate sodium (COLACE) 50 MG capsule  fluticasone (FLONASE) 50 MCG/ACT nasal spray  guaiFENesin (ROBITUSSIN) 100 MG/5ML SYRP  hydrocortisone (CORTAID) 0.5 % external cream  hydrOXYzine (ATARAX)  50 MG tablet  hyoscyamine (LEVSIN/SL) 0.125 MG sublingual tablet  ibuprofen (ADVIL/MOTRIN) 400 MG tablet  loperamide (IMODIUM) 2 MG capsule  LORazepam (ATIVAN) 0.5 MG tablet  metoclopramide (REGLAN) 10 MG tablet  multivitamin, therapeutic (THERA-VIT) TABS tablet  omeprazole (PRILOSEC) 40 MG DR capsule  ondansetron (ZOFRAN) 4 MG tablet  polyethylene glycol (MIRALAX) 17 GM/Dose powder  prochlorperazine (COMPAZINE) 10 MG tablet  promethazine (PHENERGAN) 12.5 MG tablet  sennosides (SENOKOT) 8.6 MG tablet  traZODone (DESYREL) 50 MG tablet  venlafaxine (EFFEXOR-ER) 225 MG 24 hr tablet  Vitamin D, Cholecalciferol, 25 MCG (1000 UT) TABS      Allergies   Allergen Reactions     Penicillins Rash and Unknown     Family History  Family History   Problem Relation Age of Onset     Diabetes Type 1 Father      Cancer Paternal Grandfather      Social History   Social History     Tobacco Use     Smoking status: Every Day     Packs/day: 0.50     Years: 5.00     Pack years: 2.50     Types: Vaping Device, Cigarettes     Smokeless tobacco: Never   Substance Use Topics     Alcohol use: No     Drug use: No      Past medical history, past surgical history, medications, allergies, family history, and social history were reviewed with the patient. No additional pertinent items.       Review of Systems   Constitutional: Positive for fatigue ( resolved). Negative for chills and fever.   HENT: Negative for congestion.    Eyes: Negative for redness.   Respiratory: Negative for shortness of breath.    Cardiovascular: Negative for chest pain.   Gastrointestinal: Negative for abdominal pain, nausea and vomiting.   Endocrine: Negative for polydipsia and polyuria.   Genitourinary: Negative for difficulty urinating.   Musculoskeletal: Negative for arthralgias, gait problem, neck pain and neck stiffness.   Skin: Negative for color change.   Neurological: Negative for weakness, light-headedness and headaches.   Hematological: Negative for adenopathy. Does  not bruise/bleed easily.   Psychiatric/Behavioral: Negative for confusion.   All other systems reviewed and are negative.    A complete review of systems was performed with pertinent positives and negatives noted in the HPI, and all other systems negative.    Physical Exam   BP: 128/83  Pulse: 91  Temp: 98.3  F (36.8  C)  Resp: 16  SpO2: 99 %  Physical Exam  Vitals and nursing note reviewed.   Constitutional:       General: She is not in acute distress.     Appearance: Normal appearance. She is not ill-appearing, toxic-appearing or diaphoretic.   HENT:      Head: Normocephalic and atraumatic.      Nose: Nose normal.      Mouth/Throat:      Mouth: Oropharynx is clear and moist. Mucous membranes are moist.      Pharynx: Oropharynx is clear. No oropharyngeal exudate.   Eyes:      General: No scleral icterus.     Extraocular Movements: Extraocular movements intact.      Conjunctiva/sclera: Conjunctivae normal.      Pupils: Pupils are equal, round, and reactive to light.   Cardiovascular:      Rate and Rhythm: Normal rate.      Pulses: Intact distal pulses.      Heart sounds: Normal heart sounds.   Pulmonary:      Effort: Pulmonary effort is normal. No respiratory distress.      Breath sounds: Normal breath sounds.   Abdominal:      Palpations: Abdomen is soft.      Tenderness: There is no abdominal tenderness. There is no guarding or rebound.   Musculoskeletal:         General: No tenderness or edema. Normal range of motion.      Cervical back: Normal range of motion and neck supple.      Right lower leg: No edema.      Left lower leg: No edema.   Skin:     General: Skin is warm.      Findings: No rash.   Neurological:      General: No focal deficit present.      Mental Status: She is alert and oriented to person, place, and time.      Cranial Nerves: No cranial nerve deficit.      Coordination: Coordination normal.   Psychiatric:         Mood and Affect: Mood normal.         Behavior: Behavior normal.         Thought  Content: Thought content normal.         Judgment: Judgment normal.         ED Course      Procedures         Mental Health Risk Assessment      PSS-3    Date and Time Over the past 2 weeks have you felt down, depressed, or hopeless? Over the past 2 weeks have you had thoughts of killing yourself? Have you ever attempted to kill yourself? When did this last happen? User   10/09/22 1001 yes yes yes within the last month (but not today) AAB      C-SSRS ([place])    Date and Time Q1 Wished to be Dead (Past Month) Q2 Suicidal Thoughts (Past Month) Q3 Suicidal Thought Method Q4 Suicidal Intent without Specific Plan Q5 Suicide Intent with Specific Plan Q6 Suicide Behavior (Lifetime) Within the Past 3 Months? RETIRED: Level of Risk per Screen Screening Not Complete User   10/09/22 1001 yes yes yes no no yes -- -- -- AAB                Item Assessment   Suicidal Ideation None   Plan None   Intent No   Suicidal or self-harm behaviors N/A   Risk Factors N/A   Protective Factors N/A              No results found for any visits on 10/09/22.  Medications - No data to display     Assessments & Plan (with Medical Decision Making)   22-year-old woman well-known to the emergency department presenting initially with fatigue, however, upon evaluation stating that she feels at her baseline and would like to be discharged back to her group home.  Vital signs are normal.  I believe the patient stable for discharge at this time.  There are no suicidal homicidal ideations or red flags.  She will be discharged to her group home via ambulance.      I have reviewed the nursing notes. I have reviewed the findings, diagnosis, plan and need for follow up with the patient.    Discharge Medication List as of 10/9/2022 10:57 AM          Final diagnoses:   Other fatigue       --    [facility] EMERGENCY DEPARTMENT  10/9/2022     Reena Fong MD  10/09/22 1800

## 2022-10-09 NOTE — ED NOTES
Bed: HW05  Expected date: 10/9/22  Expected time: 9:46 AM  Means of arrival:   Comments:  Fadi 897- 23yo F. Fatigue

## 2022-10-09 NOTE — ED NOTES
"Pt states she is feeling fatigue. Writer asked pt if she told her group home staff members how she was feeling. Pt stated \"no\". Writer asked her why not. Pt said \"because they don't care\". RN asked pt what she thought we'd be able to do here differently than what the  could do for her for her chief complaint of fatigue. Pt's response, \"I don't know\". Writer offered pt fluids and a snack, pt declined.   "

## 2022-10-10 NOTE — TELEPHONE ENCOUNTER
"[place] [employer] called this writer back at 7:03pm and approx 10 minutes earlier (2 different officers) - both stated they were very familiar with Sadaf and noted that the police were planning to go visit with her in lieu of sending an ambulance at this time.  Writer advised both officers that the pt was continuing to wait outside for them.  Advised that this writer had disconnected from the phone call with this pt several minutes earlier and that pt had stated she planned to take ibuprofen and tylenol and was \"having suicidal ideations\" =,  Both officers stated they were on their way to her group home at this time.     Sharon Preez RN  [facility] Triage Nurse Advisor   10/9/2022 7:11 PM     "

## 2022-10-17 ASSESSMENT — ENCOUNTER SYMPTOMS
FEVER: 0
ABDOMINAL PAIN: 0
SHORTNESS OF BREATH: 0

## 2022-10-17 NOTE — ED PROVIDER NOTES
"ED Provider Note  [facility]      History     Chief Complaint   Patient presents with     Psychiatric Evaluation     Pt called EMS from group home twice today, having suicidal thoughts wanting to \"be with my dad,\" calm and cooperative in route.     HPI  Sadaf Ross is a 22 year old female with hx of intellectual disability, BPD, bipolar disorder who comes to the ER via ems due to feeling upset and suicidal.  She lives at a group home.  She comes frequently to the ER. She calls EMS on her own all of the time.   She is her own legal guardian.    Past Medical History  Past Medical History:   Diagnosis Date     ADHD (attention deficit hyperactivity disorder)      Bipolar 1 disorder (H)      Borderline personality disorder (H)      Depression      Depressive disorder      Intellectual disability      Obesity      Syncope      Past Surgical History:   Procedure Laterality Date     APPENDECTOMY       APPENDECTOMY       acetaminophen (TYLENOL) 325 MG tablet  alum & mag hydroxide-simethicone (MAALOX  ES) 400-400-40 MG/5ML SUSP suspension  ARIPiprazole lauroxil ER (ARISTADA) 882 MG/3.2ML intra-muscular  benztropine (COGENTIN) 1 MG tablet  calcium carbonate (TUMS) 500 MG chewable tablet  chlorhexidine (PERIDEX) 0.12 % solution  clobetasol (TEMOVATE) 0.05 % CREA cream  cyclobenzaprine (FLEXERIL) 10 MG tablet  diclofenac (VOLTAREN) 1 % topical gel  docusate sodium (COLACE) 50 MG capsule  fluticasone (FLONASE) 50 MCG/ACT nasal spray  guaiFENesin (ROBITUSSIN) 100 MG/5ML SYRP  hydrocortisone (CORTAID) 0.5 % external cream  hydrOXYzine (ATARAX) 50 MG tablet  hyoscyamine (LEVSIN/SL) 0.125 MG sublingual tablet  ibuprofen (ADVIL/MOTRIN) 400 MG tablet  loperamide (IMODIUM) 2 MG capsule  LORazepam (ATIVAN) 0.5 MG tablet  metoclopramide (REGLAN) 10 MG tablet  multivitamin, therapeutic (THERA-VIT) TABS tablet  omeprazole (PRILOSEC) 40 MG DR capsule  ondansetron (ZOFRAN) 4 MG tablet  polyethylene glycol " (MIRALAX) 17 GM/Dose powder  prochlorperazine (COMPAZINE) 10 MG tablet  promethazine (PHENERGAN) 12.5 MG tablet  sennosides (SENOKOT) 8.6 MG tablet  traZODone (DESYREL) 50 MG tablet  venlafaxine (EFFEXOR-ER) 225 MG 24 hr tablet  Vitamin D, Cholecalciferol, 25 MCG (1000 UT) TABS      Allergies   Allergen Reactions     Penicillins Rash and Unknown     Family History  Family History   Problem Relation Age of Onset     Diabetes Type 1 Father      Cancer Paternal Grandfather      Social History   Social History     Tobacco Use     Smoking status: Every Day     Packs/day: 0.50     Years: 5.00     Pack years: 2.50     Types: Vaping Device, Cigarettes     Smokeless tobacco: Never   Substance Use Topics     Alcohol use: No     Drug use: No      Past medical history, past surgical history, medications, allergies, family history, and social history were reviewed with the patient. No additional pertinent items.       Review of Systems   Constitutional: Negative for fever.   Respiratory: Negative for shortness of breath.    Cardiovascular: Negative for chest pain.   Gastrointestinal: Negative for abdominal pain.   All other systems reviewed and are negative.    A complete review of systems was performed with pertinent positives and negatives noted in the HPI, and all other systems negative.    Physical Exam   BP: 130/84  Pulse: 80  Resp: 16  SpO2: 100 %  Physical Exam  Vitals and nursing note reviewed.   Constitutional:       General: She is not in acute distress.     Appearance: She is not diaphoretic.   HENT:      Head: Atraumatic.      Mouth/Throat:      Pharynx: No oropharyngeal exudate.   Eyes:      General: No scleral icterus.     Pupils: Pupils are equal, round, and reactive to light.   Cardiovascular:      Rate and Rhythm: Normal rate and regular rhythm.      Heart sounds: Normal heart sounds.   Pulmonary:      Effort: No respiratory distress.      Breath sounds: Normal breath sounds.   Abdominal:      General: Bowel  sounds are normal.      Palpations: Abdomen is soft.      Tenderness: There is no abdominal tenderness.   Musculoskeletal:         General: No tenderness.   Skin:     General: Skin is warm.      Findings: No rash.           ED Course      Procedures      Mental Health Risk Assessment      PSS-3    Date and Time Over the past 2 weeks have you felt down, depressed, or hopeless? Over the past 2 weeks have you had thoughts of killing yourself? Have you ever attempted to kill yourself? When did this last happen? User   09/17/22 1814 yes yes yes -- SMS      C-SSRS ([place])    Date and Time Q1 Wished to be Dead (Past Month) Q2 Suicidal Thoughts (Past Month) Q3 Suicidal Thought Method Q4 Suicidal Intent without Specific Plan Q5 Suicide Intent with Specific Plan Q6 Suicide Behavior (Lifetime) Within the Past 3 Months? RETIRED: Level of Risk per Screen Screening Not Complete User   09/17/22 1814 yes yes no no no yes -- -- -- SMS              Suicide assessment completed by mental health (D.E.C., LCSW, etc.)       No results found for any visits on 09/17/22.  Medications - No data to display     Assessments & Plan (with Medical Decision Making)     22 year old female with hx of intellectual disability, BPD, bipolar disorder who comes to the ER via ems due to feeling upset and suicidal.    Patient seen in coordination with behavioral crisis , please refer to the note for further details.  Agree with formulation patient would benefit from return to group home patient is active care plans and support.  Patient is agreeable.  I have reviewed the nursing notes. I have reviewed the findings, diagnosis, plan and need for follow up with the patient.    Discharge Medication List as of 9/17/2022  9:03 PM          Final diagnoses:   None       --  Becky Duggan MD  [facility] EMERGENCY DEPARTMENT  9/17/2022     Becky Duggan MD  10/17/22 0728

## 2022-10-21 ENCOUNTER — NURSE TRIAGE (OUTPATIENT)
Dept: NURSING | Facility: CLINIC | Age: 22
End: 2022-10-21

## 2022-10-21 NOTE — TELEPHONE ENCOUNTER
Patient calling with suicidal thoughts and concerns of bloody nose. Patient states that she has had two episodes of bloody nose in the last 24 hours. Patient feels like it was a large amount of blood and has had a blood clot come out of her mouth. No bleeding at this time. Denies dizziness or lightheadedness, bleeding or bruising. Protocol recommends see PCP within 24 hours to evaluate Nosebleeds.     Patient stating that a couple days ago she was suicidal and was hitting herself on the forehead with her hand. Patient reports that she always hits herself and has thought about head banging but has not banged her head on anything. Patient lives in a group home and has staff there that is sleeping but is aware of how she is feeling.   Patient able to contract with me to stay safe and no self harm until she is seen by her doctor. If patient starts feeling like harming herself she agrees to speak with her staff and call back.  Protocol recommends See HCP within 4 hours. Patient agrees to call her doctor at [facility] this morning when they open at 8 am.   Devi Arteaga RN   10/21/22 6:23 AM  [facility] Nurse Advisor    Reason for Disposition    [1] Large amount of blood has been lost (e.g., 1 cup or 240 ml) AND [2] bleeding now controlled (stopped)    Suicide thoughts, threats, attempts, or questions    [1] Patient is not threatening suicide now BUT [2] had SUICIDAL BEHAVIORS in past 3 months    Additional Information    Negative: Fainted or too weak to stand following large blood loss    Negative: Sounds like a life-threatening emergency to the triager    Negative: Nosebleed followed a nose injury    Negative: [1] Bleeding present > 30 minutes AND [2] using correct method of direct pressure    Negative: [1] Bleeding now AND [2] second call after being instructed in correct technique of direct pressure    Negative: Dizziness or lightheadedness    Negative: Pale skin (pallor) of new-onset or worsening    Negative:  [1] Has nasal packing (inserted by health care provider to control bleeding) AND [2] new rash    Negative: [1] Has nasal packing AND [2] now bleeding around the packing (Exception: few drops or ooze)    Negative: Patient sounds very sick or weak to the triager    Negative: [1] Bleeding recurs 3 or more times in 24 hours AND [2] direct pressure applied correctly    Negative: [1] Skin bruises or bleeding gums AND [2] not caused by an injury    Negative: Patient attempted suicide    Negative: Patient is threatening suicide now    Negative: Violent behavior, or threatening to physically hurt or kill someone    Negative: [1] Patient is very confused (disoriented, slurred speech) AND [2] no other adult (e.g., friend or family member) available    Negative: [1] Difficult to awaken or acting very confused (disoriented, slurred speech) AND [2] new-onset    Negative: Sounds like a life-threatening emergency to the triager    Negative: Patient attempted suicide    Negative: Patient is threatening suicide now    Negative: Violent behavior, or threatening to physically hurt or kill someone    Negative: [1] Patient is very confused (disoriented, slurred speech) AND [2] no other adult (e.g., friend or family member) available    Negative: [1] Difficult to awaken or acting very confused (disoriented, slurred speech) AND [2] new-onset    Negative: Sounds like a life-threatening emergency to the triager    Negative: Depression is main symptom and is not threatening suicide    Negative: [1] Depression symptoms (sadness, hopelessness, decreased energy) AND [2] unable to do any normal activities (e.g., self care, school, work; in comparison to baseline).    Negative: Patient sounds very sick or weak to the triager    Negative: [1] Patient is not threatening suicide now BUT [2] has a suicide PLAN (e.g., overdose, gunshot) and ACCESS (e.g., collecting pills, gun in house)    Protocols used: NOSEBLEED-A-AH, DEPRESSION-A-AH, SUICIDE  [id number]

## 2022-10-22 ENCOUNTER — HOSPITAL ENCOUNTER (EMERGENCY)
Facility: CLINIC | Age: 22
Discharge: HOME | End: 2022-10-22
Attending: EMERGENCY MEDICINE | Admitting: EMERGENCY MEDICINE
Payer: MEDICARE

## 2022-10-22 ENCOUNTER — NURSE TRIAGE (OUTPATIENT)
Dept: NURSING | Facility: CLINIC | Age: 22
End: 2022-10-22

## 2022-10-22 VITALS
OXYGEN SATURATION: 98 % | SYSTOLIC BLOOD PRESSURE: 135 MMHG | RESPIRATION RATE: 16 BRPM | DIASTOLIC BLOOD PRESSURE: 90 MMHG | TEMPERATURE: 98.2 F | HEART RATE: 75 BPM

## 2022-10-22 DIAGNOSIS — F41.9 ANXIETY: ICD-10-CM

## 2022-10-22 PROCEDURE — 99283 EMERGENCY DEPT VISIT LOW MDM: CPT | Mod: 25 | Performed by: EMERGENCY MEDICINE

## 2022-10-22 PROCEDURE — 250N000013 HC RX MED GY IP 250 OP 250 PS 637: Performed by: EMERGENCY MEDICINE

## 2022-10-22 PROCEDURE — 250N000011 HC RX IP 250 OP 636: Performed by: EMERGENCY MEDICINE

## 2022-10-22 PROCEDURE — 93005 ELECTROCARDIOGRAM TRACING: CPT | Performed by: EMERGENCY MEDICINE

## 2022-10-22 PROCEDURE — 93010 ELECTROCARDIOGRAM REPORT: CPT | Performed by: EMERGENCY MEDICINE

## 2022-10-22 PROCEDURE — 99283 EMERGENCY DEPT VISIT LOW MDM: CPT | Performed by: EMERGENCY MEDICINE

## 2022-10-22 RX ORDER — ONDANSETRON 4 MG/1
4 TABLET, ORALLY DISINTEGRATING ORAL ONCE
Status: COMPLETED | OUTPATIENT
Start: 2022-10-22 | End: 2022-10-22

## 2022-10-22 RX ORDER — ACETAMINOPHEN 500 MG
1000 TABLET ORAL ONCE
Status: COMPLETED | OUTPATIENT
Start: 2022-10-22 | End: 2022-10-22

## 2022-10-22 RX ORDER — OLANZAPINE 5 MG/1
5 TABLET, ORALLY DISINTEGRATING ORAL AT BEDTIME
Status: DISCONTINUED | OUTPATIENT
Start: 2022-10-22 | End: 2022-10-23 | Stop reason: HOSPADM

## 2022-10-22 RX ADMIN — ACETAMINOPHEN 1000 MG: 500 TABLET ORAL at 22:54

## 2022-10-22 RX ADMIN — OLANZAPINE 5 MG: 5 TABLET, ORALLY DISINTEGRATING ORAL at 19:57

## 2022-10-22 RX ADMIN — ONDANSETRON 4 MG: 4 TABLET, ORALLY DISINTEGRATING ORAL at 19:13

## 2022-10-22 ASSESSMENT — ACTIVITIES OF DAILY LIVING (ADL)
ADLS_ACUITY_SCORE: 37

## 2022-10-22 NOTE — TELEPHONE ENCOUNTER
This writer called back to follow up with the patient. She stated she had not yet called 911, was just about to call. This writer asked if the patient would like me to call 911 for her. The patient stated she would call. The patient was asked and the patient confirmed that she has no plan to harm herself at this time and will not harm herself at this time. The patient agreed to call back if she starts to feel like harming herself.  Irma Quintanilla RN on 10/22/2022 at 6:00 PM

## 2022-10-22 NOTE — TELEPHONE ENCOUNTER
"Nurse Triage SBAR    Is this a 2nd Level Triage? No - patient not established with [facility]    Situation/Background: Patient is calling with feelings of \"really bad trauma.\" Patient stated she has thoughts of suicide but no plan to harm herself at this time. Patient states she is able to perform her ADL's but everything is harder and she is very depressed. Reviewed that patient called yesterday with similar symptoms yesterday. Patient stated she was able to follow up with her clinic about her nosebleed but was not able to follow up on her depression concerns.    Assessment:   Patient states she lives in a group home  Patient does not drive  Patient states that people she lives with are aware of how she is feeling    Recommendation: Per disposition, Go to ED now (or PCP Triage). Suggested the patient go to [facility] for [facility]. Patient stated she does not like [facility] and prefers to be seen at [place]. Patient stated she will call 911. Advised patient to call back with any new or worsening symptoms. Patient verbalized understanding and agrees with plan.    Protocol Recommended Disposition: Call 911     Irma Quintanilla RN on 10/22/2022 at 5:37 PM  [facility] Nurse Advisors    Reason for Disposition    Patient sounds very sick or weak to the triager    Additional Information    Negative: Patient attempted suicide    Negative: Patient is threatening suicide now    Negative: Violent behavior, or threatening to physically hurt or kill someone    Negative: [1] Patient is very confused (disoriented, slurred speech) AND [2] no other adult (e.g., friend or family member) available    Negative: Sounds like a life-threatening emergency to the triager    Negative: [1] Difficult to awaken or acting very confused (disoriented, slurred speech) AND [2] new-onset    Negative: Depression is main symptom and is not threatening suicide    Negative: [1] Depression symptoms (sadness, hopelessness, decreased energy) AND " [2] unable to do any normal activities (e.g., self care, school, work; in comparison to baseline).    Protocols used: SUICIDE [id number]

## 2022-10-22 NOTE — ED PROVIDER NOTES
"  History     Chief Complaint   Patient presents with     Mental Health Problem     Pt called EMS today from group home due to \"trauma,\" cooperative in route.     HPI  Sadaf Ross is a 22 year old female with a past medical history of  bipolar disorder, borderline personality disorder, chest pain, obesity, intellectual disability, syncope who presents to the emergency department with a chief complaint of anxiety.  The patient states that she called EMS from her group home today due to \"trauma.\" She states the trauma is related to things that have happened to her in the past, she is not sure what triggered the episode today However, she states she felt so anxious hat she began having nausea and vomiting. She tried her usual coping mechanisms and took hydroxyzine and ativan without relief, so she called EMS to come here.     Per chart review, the patient was most recently seen in the emergency department on 10/9 for fatigue.  She was last seen for suicidal ideation on 10/6.    I have reviewed the Medications, Allergies, Past Medical and Surgical History, and Social History in the [employer] system.    Past Medical History:   Diagnosis Date     ADHD (attention deficit hyperactivity disorder)      Bipolar 1 disorder (H)      Borderline personality disorder (H)      Depression      Depressive disorder      Intellectual disability      Obesity      Syncope      Past Surgical History:   Procedure Laterality Date     APPENDECTOMY       APPENDECTOMY       Current Facility-Administered Medications   Medication     OLANZapine zydis (zyPREXA) ODT tab 5 mg     Current Outpatient Medications   Medication     acetaminophen (TYLENOL) 325 MG tablet     alum & mag hydroxide-simethicone (MAALOX  ES) 400-400-40 MG/5ML SUSP suspension     ARIPiprazole lauroxil ER (ARISTADA) 882 MG/3.2ML intra-muscular     benztropine (COGENTIN) 1 MG tablet     calcium carbonate (TUMS) 500 MG chewable tablet     chlorhexidine (PERIDEX) 0.12 % solution "     clobetasol (TEMOVATE) 0.05 % CREA cream     cyclobenzaprine (FLEXERIL) 10 MG tablet     diclofenac (VOLTAREN) 1 % topical gel     docusate sodium (COLACE) 50 MG capsule     fluticasone (FLONASE) 50 MCG/ACT nasal spray     guaiFENesin (ROBITUSSIN) 100 MG/5ML SYRP     hydrocortisone (CORTAID) 0.5 % external cream     hydrOXYzine (ATARAX) 50 MG tablet     hyoscyamine (LEVSIN/SL) 0.125 MG sublingual tablet     ibuprofen (ADVIL/MOTRIN) 400 MG tablet     loperamide (IMODIUM) 2 MG capsule     LORazepam (ATIVAN) 0.5 MG tablet     metoclopramide (REGLAN) 10 MG tablet     multivitamin, therapeutic (THERA-VIT) TABS tablet     omeprazole (PRILOSEC) 40 MG DR capsule     ondansetron (ZOFRAN) 4 MG tablet     polyethylene glycol (MIRALAX) 17 GM/Dose powder     prochlorperazine (COMPAZINE) 10 MG tablet     promethazine (PHENERGAN) 12.5 MG tablet     sennosides (SENOKOT) 8.6 MG tablet     traZODone (DESYREL) 50 MG tablet     venlafaxine (EFFEXOR-ER) 225 MG 24 hr tablet     Vitamin D, Cholecalciferol, 25 MCG (1000 UT) TABS     Allergies   Allergen Reactions     Penicillins Rash and Unknown     Past medical history, past surgical history, medications, and allergies were reviewed with the patient. Additional pertinent items: None    Social History     Socioeconomic History     Marital status: Single     Spouse name: Not on file     Number of children: Not on file     Years of education: Not on file     Highest education level: Not on file   Occupational History     Not on file   Tobacco Use     Smoking status: Every Day     Packs/day: 0.50     Years: 5.00     Pack years: 2.50     Types: Vaping Device, Cigarettes     Smokeless tobacco: Never   Substance and Sexual Activity     Alcohol use: No     Drug use: No     Sexual activity: Yes     Partners: Female, Male     Birth control/protection: Pill   Other Topics Concern     Not on file   Social History Narrative     Not on file     Social Determinants of Health     Financial Resource  Strain: Not on file   Food Insecurity: Not on file   Transportation Needs: Not on file   Physical Activity: Not on file   Stress: Not on file   Social Connections: Not on file   Intimate Partner Violence: Not on file   Housing Stability: Not on file     Social history was reviewed with the patient. Additional pertinent items: None    Review of Systems  General: No fevers or chills  Skin: No rash or diaphoresis  Eyes: No eye redness or discharge  Ears/Nose/Throat: No rhinorrhea or nasal congestion  Respiratory: No cough or SOB  Cardiovascular: No chest pain or palpitations  Gastrointestinal: positive for nausea and vomiting  Genitourinary: No urinary frequency, hematuria, or dysuria  Musculoskeletal: No arthralgias or myalgias  Neurologic: No numbness or weakness  Psychiatric: See HPI  Hematologic/Lymphatic/Immunologic: No leg swelling, no easy bruising/bleeding  Endocrine: No polyuria/polydypsia    A complete review of systems was performed with pertinent positives and negatives noted in the HPI, and all other systems negative.    Physical Exam   BP: 128/85  Pulse: 99  Temp: 98  F (36.7  C)  Resp: 16  SpO2: 98 %      General: Well nourished, well developed, NAD  HEENT: EOMI, anicteric. NCAT, MMM  Neck: no jugular venous distension, supple, nl ROM  Cardiac: Regular rate, extremities well perfused  Pulm: nlb, nl rr  Skin: Warm and dry to the touch.  No rash  Extremities: No LE edema, no cyanosis, w/w/p  Neuro: A&Ox3, no gross focal deficits  Psych: calm, cooperative, anxious mood    ED Course        Procedures               EKG Interpretation:      Interpreted by Tori Whalen MD  Time reviewed:1929   Symptoms at time of EKG: chest pain    Rhythm: normal sinus   Rate: normal  Axis: NORMAL  Ectopy: none  Conduction: normal  ST Segments/ T Waves: No ST-T wave changes  Q Waves: none  Comparison to prior: Unchanged from 10/3/22    Clinical Impression: normal EKG                  Labs Ordered and Resulted from Time  of ED Arrival to Time of ED Departure - No data to display         Results for orders placed or performed during the hospital encounter of 10/22/22 (from the past 24 hour(s))   EKG 12 lead   Result Value Ref Range    Systolic Blood Pressure  mmHg    Diastolic Blood Pressure  mmHg    Ventricular Rate 91 BPM    Atrial Rate 91 BPM    TN Interval 188 ms    QRS Duration 88 ms     ms    QTc 423 ms    P Axis 40 degrees    R AXIS 19 degrees    T Axis 7 degrees    Interpretation ECG       Sinus rhythm  Nonspecific T wave abnormality  Abnormal ECG         Labs, vital signs, and imaging studies were reviewed by me.    Medications   OLANZapine zydis (zyPREXA) ODT tab 5 mg (5 mg Oral Given 10/22/22 1957)   ondansetron (ZOFRAN ODT) ODT tab 4 mg (4 mg Oral Given 10/22/22 1913)       Assessments & Plan (with Medical Decision Making)   Sadaf Rsos is a 22 year old female who presents with anxiety. Pt is calm and cooperative, not actively vomiting in the ER. Seems to be at her baseline.     Pt given medication for nausea and anxiety in the ER    I have reviewed the nursing notes.    I have reviewed the findings, diagnosis, plan and need for follow up with the patient.    Patient to be discharged back to her group home. Advised to follow up with PCP/her mental health team as scheduled. To return to ER immediately with any new/worsening symptoms. Plan of care discussed with patient who expresses understanding and agrees with plan of care.    New Prescriptions    No medications on file       Final diagnoses:   Anxiety     Tori Whalen MD  10/22/2022   [facility] EMERGENCY DEPARTMENT     Tori Whalen MD  10/22/22 2057

## 2022-10-23 ENCOUNTER — NURSE TRIAGE (OUTPATIENT)
Dept: NURSING | Facility: CLINIC | Age: 22
End: 2022-10-23

## 2022-10-23 NOTE — TELEPHONE ENCOUNTER
"Addendum    4:34 pm      10/23/2022  Calling to inquire if [place] treats PTSD.  Writer told patient if she needs help today she would need to be seen in the ED.     Autumn Alcaraz RN, MA  [facility] Triage Nurse Advisor             Situation: call from patient  'Patient says her PTSD is acting up. 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 Patient reports feeling anxious, shaky and that she has sharp body pains everywhere. Pain level is \"9.\"   She reports she always feels anxious \"always.\" she denies thoughts of self-harm.    She also has a cough and nasal congestion.    Patient says symptoms woke her up at about 3pm.     Background:  Will see new therapist ton 11/1/22    Sees psychiatrist on 11/8/22 at 8am    Seen in ED yesterday for anxiety.    Assessment: did not sound extremely anxious on the phone; long term issue w/ anxiety    Disposition: be seen in 2 weeks  Advised to take her ativan and see if they will help her symptoms. Advised to keep her mind occupied w/ activities.    Advised to call back in the next couple hours if needed. Patient verbalized understanding.        Reason for Disposition    [1] Symptoms of anxiety or panic attack AND [2] is a chronic symptom (recurrent or ongoing AND present > 4 weeks)    Additional Information    Negative: SEVERE difficulty breathing (e.g., struggling for each breath, speaks in single words)    Negative: Bluish (or gray) lips or face now    Negative: Difficult to awaken or acting confused (e.g., disoriented, slurred speech)    Negative: Hysterical or combative behavior    Negative: Sounds like a life-threatening emergency to the triager    Negative: [1] Difficulty breathing AND [2] persists > 10 minutes AND [3] not relieved by reassurance provided by triager    Negative: [1] Lightheadedness or dizziness AND [2] persists > 10 minutes AND [3] not relieved by reassurance provided by triager    Negative: [1] Alcohol or drug use, known or suspected AND [2] " feeling very shaky (i.e., visible tremors of hands)    Negative: Patient sounds very sick or weak to the triager    Negative: Symptoms interfere with work or school    Negative: Requesting to talk to a counselor (e.g., mental health worker, psychiatrist)    Negative: Patient sounds very upset or troubled to the triager    Negative: [1] Symptoms of anxiety or panic AND [2] has not been evaluated for this by physician    Negative: [1] Started on anti-anxiety medication AND [2] no relief    Negative: [1] Significant weight loss (or gain) AND [2] not dieting    Negative: Taking thyroid medications    Negative: Unhealthy caffeine use, known or suspected (e.g., > 2 cups of coffee/tea or > 4 cans of soda / day)    Negative: Substance use (drug use) or misuse, known or suspected    Negative: Taking herbal remedies    Negative: Recent traumatic event (e.g., death of a loved one, job loss, victim/witness of crime)    Negative: Symptoms interfere with sleep    Protocols used: ANXIETY AND PANIC ATTACK-A-AH

## 2022-10-23 NOTE — DISCHARGE INSTRUCTIONS
TODAY'S VISIT:  You were seen today for mental health problem  -   - If you had any labs or imaging/radiology tests performed today, you should also discuss these tests with your usual provider.     FOLLOW-UP:  Please make an appointment to follow up with:  - Your Primary Care Provider. If you do not have a PCP, please call the Primary Care Center (phone: [phone] for an appointment  - your mental health providers as scheduled    - Have your provider review the results from today's visit with you again to make sure no further follow-up or additional testing is needed based on those results.     RETURN TO THE EMERGENCY DEPARTMENT  Return to the Emergency Department at any time for any new or worsening symptoms or any concerns.

## 2022-10-24 LAB
ATRIAL RATE - MUSE: 91 BPM
DIASTOLIC BLOOD PRESSURE - MUSE: NORMAL MMHG
INTERPRETATION ECG - MUSE: NORMAL
P AXIS - MUSE: 40 DEGREES
PR INTERVAL - MUSE: 188 MS
QRS DURATION - MUSE: 88 MS
QT - MUSE: 344 MS
QTC - MUSE: 423 MS
R AXIS - MUSE: 19 DEGREES
SYSTOLIC BLOOD PRESSURE - MUSE: NORMAL MMHG
T AXIS - MUSE: 7 DEGREES
VENTRICULAR RATE- MUSE: 91 BPM

## 2022-11-30 ENCOUNTER — NURSE TRIAGE (OUTPATIENT)
Dept: NURSING | Facility: CLINIC | Age: 22
End: 2022-11-30

## 2022-11-30 NOTE — TELEPHONE ENCOUNTER
Nurse Triage SBAR    Is this a 2nd Level Triage? NO    Situation: Patient reporting hemorrhoid that is severely painful and burning.    Background: Reports she has an appointment on Friday but can not wait that long. Has been taking stool softeners.    Assessment: denies fever. Reports last bowel movement was yesterday. BMs are now watery. Denies rectal itch.    Protocol Recommended Disposition:   Go To Office Now    Recommendation: Go to Office now. Care advice and disposition given to patient who verbalizes understanding. Reports she was trying to contact [facility], encouraged to go to UC for treatment and continue with laxatives and stool softeners. Patient reports she will go to [facility].      Lien Deleon RN on 11/30/2022 at 2:51 PM      Does the patient meet one of the following criteria for ADS visit consideration? 16+ years old, no PCP (internal or external) but seen at [facility]     TIP  Providers, please consider if this condition is appropriate for management at one of our Acute and Diagnostic Services sites.     If patient is a good candidate, please use dotphrase <dot>triageresponse and select Refer to ADS to document.    Reason for Disposition    Severe rectal pain    Additional Information    Negative: Sounds like a life-threatening emergency to the triager    Negative: Diarrhea is main symptom    Negative: Constipation is main symptom (e.g., pain or discomfort caused by passage of hard BMs)    Negative: Blood in or on bowel movement is main symptom    Negative: Sexual assault or rape (sexual intercourse or activity occurs without freely given consent), known or suspected    Negative: Injury to rectum    Negative: Patient sounds very sick or weak to the triager    Protocols used: RECTAL SYMPTOMS-A-OH

## 2022-12-07 ENCOUNTER — NURSE TRIAGE (OUTPATIENT)
Dept: NURSING | Facility: CLINIC | Age: 22
End: 2022-12-07

## 2022-12-08 NOTE — TELEPHONE ENCOUNTER
"Patient reporting trouble sleeping all year.  Was up all night last night.  Denies acute pain, though says she has chronic pain.  Disposition is to see provider within two weeks.  Care advice for sleep tips given.     Patient then said she is also feeling \"shaky\" all day.  Denies she is a diabetic, is not on diabetic medication, denies drug/alcohol use, and denies spastic jerks/tics.      Patient also says she is having dizziness that won't go away and has anxiety, and shouldn't she go to hospital?    For anxiety and dizziness disposition is to ER but recommended patient also see her PCP and/or mental health provider.   Verbalizes understanding but has trouble finding a ride as she lives in a group home.  Encouraged patient to speak with group home staff.    Areli Franklin RN  [facility]        Reason for Disposition    [1] Lightheadedness or dizziness AND [2] persists > 10 minutes AND [3] not relieved by reassurance provided by triager    Additional Information    Negative: Requesting medication for sleep (\"sleeping pill\")    Negative: Insomnia interferes with work or school    Negative: [1] Insomnia persists > 1 week AND [2] no improvement after using CARE ADVICE    Negative: [1] Pain is causing insomnia AND [2] pain is a chronic symptom (recurrent or ongoing AND present > 4 weeks)    Negative: Awakened  by jerking leg movements    Negative: [1] Restless legs or unpleasant feeling in legs AND [2] causes insomnia    Negative: Loud snoring is an ongoing problem  (> 2 weeks)    Negative: Suspected substance use (drug use), addiction, or withdrawal    Negative: Suspected alcohol withdrawal or unhealthy use of alcohol (drinking too much)    Negative: Difficulty breathing    Negative: Depression is suspected    Negative: Traumatic Brain Injury (TBI) is suspected    Negative: [1] Pain is causing insomnia AND [2] pain is not a chronic symptom (recurrent or ongoing AND present > 4 weeks)    Negative: SEVERE " difficulty breathing (e.g., struggling for each breath, speaks in single words)    Negative: Bluish (or gray) lips or face now    Negative: Difficult to awaken or acting confused (e.g., disoriented, slurred speech)    Negative: Hysterical or combative behavior    Negative: Sounds like a life-threatening emergency to the triager    Negative: [1] Difficulty breathing AND [2] persists > 10 minutes AND [3] not relieved by reassurance provided by triager    Protocols used: ANXIETY AND PANIC ATTACK-A-AH, INSOMNIA-A-AH

## 2022-12-09 ENCOUNTER — LAB REQUISITION (OUTPATIENT)
Dept: LAB | Facility: CLINIC | Age: 22
End: 2022-12-09
Payer: MEDICARE

## 2022-12-09 DIAGNOSIS — F33.42 MAJOR DEPRESSIVE DISORDER, RECURRENT, IN FULL REMISSION (H): ICD-10-CM

## 2022-12-09 LAB
DEPRECATED CALCIDIOL+CALCIFEROL SERPL-MC: 31 UG/L (ref 20–75)
FOLATE SERPL-MCNC: 27.8 NG/ML (ref 4.6–34.8)

## 2022-12-09 PROCEDURE — 84146 ASSAY OF PROLACTIN: CPT | Mod: ORL | Performed by: INTERNAL MEDICINE

## 2022-12-09 PROCEDURE — 82746 ASSAY OF FOLIC ACID SERUM: CPT | Mod: ORL | Performed by: INTERNAL MEDICINE

## 2022-12-09 PROCEDURE — 80061 LIPID PANEL: CPT | Mod: ORL | Performed by: INTERNAL MEDICINE

## 2022-12-09 PROCEDURE — 84480 ASSAY TRIIODOTHYRONINE (T3): CPT | Mod: ORL | Performed by: INTERNAL MEDICINE

## 2022-12-09 PROCEDURE — 84443 ASSAY THYROID STIM HORMONE: CPT | Mod: ORL | Performed by: INTERNAL MEDICINE

## 2022-12-09 PROCEDURE — 83735 ASSAY OF MAGNESIUM: CPT | Mod: ORL | Performed by: INTERNAL MEDICINE

## 2022-12-09 PROCEDURE — 82607 VITAMIN B-12: CPT | Mod: ORL | Performed by: INTERNAL MEDICINE

## 2022-12-09 PROCEDURE — 84630 ASSAY OF ZINC: CPT | Mod: ORL | Performed by: INTERNAL MEDICINE

## 2022-12-09 PROCEDURE — 83550 IRON BINDING TEST: CPT | Mod: ORL | Performed by: INTERNAL MEDICINE

## 2022-12-09 PROCEDURE — 82728 ASSAY OF FERRITIN: CPT | Mod: ORL | Performed by: INTERNAL MEDICINE

## 2022-12-09 PROCEDURE — 82306 VITAMIN D 25 HYDROXY: CPT | Mod: ORL | Performed by: INTERNAL MEDICINE

## 2022-12-09 PROCEDURE — 84436 ASSAY OF TOTAL THYROXINE: CPT | Mod: ORL | Performed by: INTERNAL MEDICINE

## 2022-12-10 ENCOUNTER — HOSPITAL ENCOUNTER (EMERGENCY)
Facility: CLINIC | Age: 22
Discharge: NURSING, REHAB OR LONG-TERM CARE | End: 2022-12-10
Attending: STUDENT IN AN ORGANIZED HEALTH CARE EDUCATION/TRAINING PROGRAM | Admitting: STUDENT IN AN ORGANIZED HEALTH CARE EDUCATION/TRAINING PROGRAM
Payer: MEDICARE

## 2022-12-10 ENCOUNTER — HOSPITAL ENCOUNTER (EMERGENCY)
Facility: CLINIC | Age: 22
Discharge: HOME | End: 2022-12-10
Attending: FAMILY MEDICINE | Admitting: FAMILY MEDICINE
Payer: MEDICARE

## 2022-12-10 ENCOUNTER — NURSE TRIAGE (OUTPATIENT)
Dept: NURSING | Facility: CLINIC | Age: 22
End: 2022-12-10

## 2022-12-10 VITALS
HEART RATE: 89 BPM | RESPIRATION RATE: 18 BRPM | TEMPERATURE: 97.1 F | OXYGEN SATURATION: 97 % | DIASTOLIC BLOOD PRESSURE: 80 MMHG | SYSTOLIC BLOOD PRESSURE: 135 MMHG | WEIGHT: 237 LBS | HEIGHT: 64 IN | BODY MASS INDEX: 40.46 KG/M2

## 2022-12-10 VITALS
TEMPERATURE: 98.3 F | SYSTOLIC BLOOD PRESSURE: 102 MMHG | RESPIRATION RATE: 16 BRPM | HEART RATE: 70 BPM | OXYGEN SATURATION: 98 % | DIASTOLIC BLOOD PRESSURE: 64 MMHG

## 2022-12-10 DIAGNOSIS — R04.0 EPISTAXIS: ICD-10-CM

## 2022-12-10 PROCEDURE — 99282 EMERGENCY DEPT VISIT SF MDM: CPT | Performed by: STUDENT IN AN ORGANIZED HEALTH CARE EDUCATION/TRAINING PROGRAM

## 2022-12-10 PROCEDURE — 250N000009 HC RX 250: Performed by: STUDENT IN AN ORGANIZED HEALTH CARE EDUCATION/TRAINING PROGRAM

## 2022-12-10 PROCEDURE — 99282 EMERGENCY DEPT VISIT SF MDM: CPT | Performed by: FAMILY MEDICINE

## 2022-12-10 PROCEDURE — 99282 EMERGENCY DEPT VISIT SF MDM: CPT | Mod: 27 | Performed by: FAMILY MEDICINE

## 2022-12-10 PROCEDURE — 99283 EMERGENCY DEPT VISIT LOW MDM: CPT | Performed by: STUDENT IN AN ORGANIZED HEALTH CARE EDUCATION/TRAINING PROGRAM

## 2022-12-10 RX ORDER — OXYMETAZOLINE HYDROCHLORIDE 0.05 G/100ML
2 SPRAY NASAL ONCE
Status: COMPLETED | OUTPATIENT
Start: 2022-12-10 | End: 2022-12-10

## 2022-12-10 RX ADMIN — OXYMETAZOLINE HYDROCHLORIDE 2 SPRAY: 0.05 SPRAY NASAL at 10:45

## 2022-12-10 ASSESSMENT — ACTIVITIES OF DAILY LIVING (ADL)
ADLS_ACUITY_SCORE: 37

## 2022-12-10 NOTE — ED PROVIDER NOTES
[facility] EMERGENCY DEPARTMENT ([facility])    12/10/22      ED PROVIDER NOTE        History     Chief Complaint   Patient presents with     Epistaxis     The history is provided by the EMS personnel, the patient and medical records.   Epistaxis    Sadaf Ross is a 22 year old female who has a past medical history of borderline personality disorder, bipolar 1 disorder, major depressive disorder, and history of chronic SI presenting to the ED via EMS for evaluation of recurring nose bleed. Patient states that she has had three nose bleeds since 0100. Also notes that she has vomited a few blood clots since this began.  She had been using pressure on the wound at her group home.  Each nosebleed did not last more than 10 to 15 minutes.  She has had very similar episodes to this in the past during periods of dry weather.  She has not had any other abnormal bleeding or bruising that she has noted.  She has not on any blood thinning medications.    Past Medical History  Past Medical History:   Diagnosis Date     ADHD (attention deficit hyperactivity disorder)      Bipolar 1 disorder (H)      Borderline personality disorder (H)      Depression      Depressive disorder      Intellectual disability      Obesity      Syncope      Past Surgical History:   Procedure Laterality Date     APPENDECTOMY       APPENDECTOMY       acetaminophen (TYLENOL) 325 MG tablet  alum & mag hydroxide-simethicone (MAALOX  ES) 400-400-40 MG/5ML SUSP suspension  ARIPiprazole lauroxil ER (ARISTADA) 882 MG/3.2ML intra-muscular  benztropine (COGENTIN) 1 MG tablet  calcium carbonate (TUMS) 500 MG chewable tablet  chlorhexidine (PERIDEX) 0.12 % solution  clobetasol (TEMOVATE) 0.05 % CREA cream  cyclobenzaprine (FLEXERIL) 10 MG tablet  diclofenac (VOLTAREN) 1 % topical gel  docusate sodium (COLACE) 50 MG capsule  fluticasone (FLONASE) 50 MCG/ACT nasal spray  guaiFENesin (ROBITUSSIN) 100 MG/5ML SYRP  hydrocortisone (CORTAID) 0.5 % external  "cream  hydrOXYzine (ATARAX) 50 MG tablet  hyoscyamine (LEVSIN/SL) 0.125 MG sublingual tablet  ibuprofen (ADVIL/MOTRIN) 400 MG tablet  loperamide (IMODIUM) 2 MG capsule  LORazepam (ATIVAN) 0.5 MG tablet  metoclopramide (REGLAN) 10 MG tablet  multivitamin, therapeutic (THERA-VIT) TABS tablet  omeprazole (PRILOSEC) 40 MG DR capsule  ondansetron (ZOFRAN) 4 MG tablet  polyethylene glycol (MIRALAX) 17 GM/Dose powder  prochlorperazine (COMPAZINE) 10 MG tablet  promethazine (PHENERGAN) 12.5 MG tablet  sennosides (SENOKOT) 8.6 MG tablet  traZODone (DESYREL) 50 MG tablet  venlafaxine (EFFEXOR-ER) 225 MG 24 hr tablet  Vitamin D, Cholecalciferol, 25 MCG (1000 UT) TABS      Allergies   Allergen Reactions     Penicillins Rash and Unknown     Family History  Family History   Problem Relation Age of Onset     Diabetes Type 1 Father      Cancer Paternal Grandfather      Social History   Social History     Tobacco Use     Smoking status: Every Day     Packs/day: 0.50     Years: 5.00     Pack years: 2.50     Types: Vaping Device, Cigarettes     Smokeless tobacco: Never   Substance Use Topics     Alcohol use: No     Drug use: No      Past medical history, past surgical history, medications, allergies, family history, and social history were reviewed with the patient. No additional pertinent items.       Review of Systems   HENT: Positive for nosebleeds.      A complete review of systems was performed with pertinent positives and negatives noted in the HPI, and all other systems negative.    Physical Exam   BP: 135/80  Pulse: 89  Temp: 97.1  F (36.2  C)  Resp: 18  Height: 162.6 cm (5' 4\")  Weight: 107.5 kg (237 lb)  SpO2: 97 %  Physical Exam  General: No acute distress. Appears stated age.   HENT: MMM, no oropharyngeal lesions.  Scab noted over right anterior nasal septum that was potentially source of her earlier bleeding.  No active bleeding on exam  Eyes: PERRL, normal sclerae   Cardio: Regular rate, extremities well perfused  Resp: " Normal work of breathing, normal respiratory rate  Neuro: alert and fully oriented. CN II-XII grossly intact. Grossly normal strength and sensation in all extremities.   MSK: no deformities. Grossly normal ROM in extremities.   Integumentary/Skin: no rash, normal color  Psych: normal affect, normal behavior    ED Course      Procedures                   No results found for any visits on 12/10/22.  Medications   oxymetazoline (AFRIN) 0.05 % spray 2 spray (2 sprays Both Nostrils Given by Other Clinician 12/10/22 1045)        Assessments & Plan (with Medical Decision Making)   Sadaf Ross is a 22-year-old with a history of bipolar disorder who presents with recurrent bloody nose this morning at her group home.  She has had episodes of this previously.  She is not on any blood thinner medications.  She arrives with no ongoing bleeding.  No other bleeding or abnormal bruising to warrant coagulopathy work-up.  On exam, note small scab over right anterior nasal septum that was potentially the source for bleeding.  Given recurrent nature, did give 2 sprays of Afrin to each nostril to prevent rebleed.  Observed over period of hours in the emergency department with no recurrence of her bleeding.  She will discharge back to her group home.  Advised follow-up with her primary care provider.  Return to the ED with new or worsening symptoms    I have reviewed the nursing notes. I have reviewed the findings, diagnosis, plan and need for follow up with the patient.    New Prescriptions    No medications on file       Final diagnoses:   Epistaxis     INhi, am serving as a trained medical scribe to document services personally performed by Kristina Cali MD, based on the provider's statements to me.     IKristina MD, was physically present and have reviewed and verified the accuracy of this note documented by Nhi Eric.    --  Kristina Cali MD  [facility] EMERGENCY DEPARTMENT  12/10/2022     [place]  MD Kristina  Resident  12/10/22 3430

## 2022-12-10 NOTE — ED NOTES
Bed: ED08  Expected date: 12/10/22  Expected time: 8:56 AM  Means of arrival:   Comments:  Fadi 718    21 y/o F nosebleed

## 2022-12-10 NOTE — DISCHARGE INSTRUCTIONS
You are seen today for a bloody nose.  It has not been bleeding since you are here in the hospital.  You did receive Afrin to prevent further nosebleeds.  Please avoid blowing your nose for the rest of the day.  Return to the ED with any new or worsening symptoms.  Please follow-up with your primary doctor

## 2022-12-10 NOTE — TELEPHONE ENCOUNTER
Just got home from hospital (discharged 11:18 am). Shopping on the way home, she had another bloody nose. ER used a special nasal spray (Afrin). She had been both vomiting and spitting up blood. It began 1 am. Bleeding from left nostril, it has stopped for now. She wants to know if she should come back in to prevent it from bleeding again ?  ER notes reviewed. Advised not to blow her nose. Advised to continue to monitor, since she is not bleeding currently. If bleeding reoccurs, then recommended to contact oncall provider for [facility] or return to the ER. She is to follow up with [facility] provider as soon as possible next week per ER MD instruction.    Nya Banegas RN Triage Nurse Advisor 5:15 PM 12/10/2022

## 2022-12-10 NOTE — ED TRIAGE NOTES
Per EMS, pt has nose bleed on and off since 0100, emesis x3; reports this happens to her frequently when it's cold outside. Pt ambulatory into ER.    Pt reports she is coming from a group home, reports her nose started bleeding around 0100 after she took a shower. Pt has blood on her clothes on arrival. Pt reports feeling well prior to the nose bleed but has thrown up several times since it started.

## 2022-12-11 LAB
CHOLEST SERPL-MCNC: 147 MG/DL
FERRITIN SERPL-MCNC: 88 NG/ML (ref 6–175)
HDLC SERPL-MCNC: 39 MG/DL
IRON BINDING CAPACITY (ROCHE): 327 UG/DL (ref 240–430)
IRON SATN MFR SERPL: 15 % (ref 15–46)
IRON SERPL-MCNC: 49 UG/DL (ref 37–145)
LDLC SERPL CALC-MCNC: 68 MG/DL
MAGNESIUM SERPL-MCNC: 2.1 MG/DL (ref 1.7–2.3)
NONHDLC SERPL-MCNC: 108 MG/DL
PROLACTIN SERPL 3RD IS-MCNC: 7 NG/ML (ref 5–23)
T3 SERPL-MCNC: 104 NG/DL (ref 85–202)
T4 SERPL-MCNC: 6.3 UG/DL (ref 4.5–11.7)
TRIGL SERPL-MCNC: 201 MG/DL
TSH SERPL DL<=0.005 MIU/L-ACNC: 1.3 UIU/ML (ref 0.3–4.2)
VIT B12 SERPL-MCNC: 357 PG/ML (ref 232–1245)
ZINC SERPL-MCNC: 85.9 UG/DL

## 2022-12-11 NOTE — DISCHARGE INSTRUCTIONS
Discharge back to group home with nose clip to be used if any further epistaxis as well as humidifier to be used in her room to help prevent the epistaxis

## 2022-12-11 NOTE — ED TRIAGE NOTES
Pt reports multiple nosebleeds since discharge- not currently bleeding.      Triage Assessment     Row Name 12/10/22 7731       Triage Assessment (Adult)    Airway WDL WDL       Respiratory WDL    Respiratory WDL WDL       Skin Circulation/Temperature WDL    Skin Circulation/Temperature WDL WDL       Cardiac WDL    Cardiac WDL WDL       Peripheral/Neurovascular WDL    Peripheral Neurovascular WDL WDL       Cognitive/Neuro/Behavioral WDL    Cognitive/Neuro/Behavioral WDL WDL

## 2022-12-11 NOTE — ED PROVIDER NOTES
ED Provider Note  [facility]      History     Chief Complaint   Patient presents with     Epistaxis     BIBA from a [facility]. Nose bleeding throughout the day. Not actively bleeding.     HPI  Sadaf Ross is a 22 year old female who presents emergency room for the second time today with recurrent epistaxis.  Patient has had intermittent bleeding throughout the day is not currently actively bleeding and does not appear to have any obvious site for cauterization on examination here.  Patient has extensive psychiatric history please refer to past records however today patient is at baseline and is strictly here for her epistaxis.  In light of the fact that there was not any obvious spot to cauterize I talked about preventative treatments including the possibility of placing a humidifier in her room I spoke with group home staff they agree that this would be a good possibility we also demonstrated the use of a nasal clip.    Past Medical History  Past Medical History:   Diagnosis Date     ADHD (attention deficit hyperactivity disorder)      Bipolar 1 disorder (H)      Borderline personality disorder (H)      Depression      Depressive disorder      Intellectual disability      Obesity      Syncope      Past Surgical History:   Procedure Laterality Date     APPENDECTOMY       APPENDECTOMY       acetaminophen (TYLENOL) 325 MG tablet  alum & mag hydroxide-simethicone (MAALOX  ES) 400-400-40 MG/5ML SUSP suspension  ARIPiprazole lauroxil ER (ARISTADA) 882 MG/3.2ML intra-muscular  benztropine (COGENTIN) 1 MG tablet  calcium carbonate (TUMS) 500 MG chewable tablet  chlorhexidine (PERIDEX) 0.12 % solution  clobetasol (TEMOVATE) 0.05 % CREA cream  cyclobenzaprine (FLEXERIL) 10 MG tablet  diclofenac (VOLTAREN) 1 % topical gel  docusate sodium (COLACE) 50 MG capsule  fluticasone (FLONASE) 50 MCG/ACT nasal spray  guaiFENesin (ROBITUSSIN) 100 MG/5ML SYRP  hydrocortisone (CORTAID) 0.5 % external  cream  hydrOXYzine (ATARAX) 50 MG tablet  hyoscyamine (LEVSIN/SL) 0.125 MG sublingual tablet  ibuprofen (ADVIL/MOTRIN) 400 MG tablet  loperamide (IMODIUM) 2 MG capsule  LORazepam (ATIVAN) 0.5 MG tablet  metoclopramide (REGLAN) 10 MG tablet  multivitamin, therapeutic (THERA-VIT) TABS tablet  omeprazole (PRILOSEC) 40 MG DR capsule  ondansetron (ZOFRAN) 4 MG tablet  polyethylene glycol (MIRALAX) 17 GM/Dose powder  prochlorperazine (COMPAZINE) 10 MG tablet  promethazine (PHENERGAN) 12.5 MG tablet  sennosides (SENOKOT) 8.6 MG tablet  traZODone (DESYREL) 50 MG tablet  venlafaxine (EFFEXOR-ER) 225 MG 24 hr tablet  Vitamin D, Cholecalciferol, 25 MCG (1000 UT) TABS      Allergies   Allergen Reactions     Penicillins Rash and Unknown     Family History  Family History   Problem Relation Age of Onset     Diabetes Type 1 Father      Cancer Paternal Grandfather      Social History   Social History     Tobacco Use     Smoking status: Every Day     Packs/day: 0.50     Years: 5.00     Pack years: 2.50     Types: Vaping Device, Cigarettes     Smokeless tobacco: Never   Substance Use Topics     Alcohol use: No     Drug use: No      Past medical history, past surgical history, medications, allergies, family history, and social history were reviewed with the patient. No additional pertinent items.       Review of Systems  A complete review of systems was performed with pertinent positives and negatives noted in the HPI, and all other systems negative.    Physical Exam   BP: 137/82  Pulse: 87  Temp: 98.3  F (36.8  C)  Resp: 16  SpO2: 98 %  Physical Exam  Constitutional:       General: She is not in acute distress.     Appearance: She is not diaphoretic.   HENT:      Head: Atraumatic.      Nose:      Right Nostril: No epistaxis or septal hematoma.      Left Nostril: No epistaxis or septal hematoma.      Right Sinus: No maxillary sinus tenderness or frontal sinus tenderness.      Left Sinus: No maxillary sinus tenderness or frontal sinus  tenderness.      Mouth/Throat:      Pharynx: No oropharyngeal exudate.   Eyes:      General: No scleral icterus.     Pupils: Pupils are equal, round, and reactive to light.   Cardiovascular:      Heart sounds: Normal heart sounds.   Pulmonary:      Effort: No respiratory distress.      Breath sounds: Normal breath sounds.   Abdominal:      General: Bowel sounds are normal.      Palpations: Abdomen is soft.      Tenderness: There is no abdominal tenderness.   Musculoskeletal:         General: No tenderness.   Skin:     General: Skin is warm.      Findings: No rash.   Neurological:      General: No focal deficit present.      Mental Status: She is oriented to person, place, and time.      Sensory: No sensory deficit.      Motor: No weakness.      Coordination: Coordination normal.   Psychiatric:         Attention and Perception: She does not perceive auditory or visual hallucinations.         Mood and Affect: Mood is anxious.         Speech: Speech normal.         Behavior: Behavior is cooperative.         Thought Content: Thought content does not include homicidal or suicidal ideation.         ED Course      Procedures    The medical record was reviewed and interpreted.    Medications - No data to display     Assessments & Plan (with Medical Decision Making)       I have reviewed the nursing notes. I have reviewed the findings, diagnosis, plan and need for follow up with the patient.    Patient with repeated ER visits secondary to intermittent epistaxis no obvious site of bleeding at this time no active bleeding while she was here in the emergency room under observation.  Patient was at essentially at baseline for her psychiatric illness but I discussed the case with the group home staff and they agreed that the patient would benefit from a humidifier in her room I was able to locate a humidifier here from the [facility] and we will provide this for the patient in hopes that this will decrease the number of ER visits for  her intermittent epistaxis.  They also were educated on the use of a nose clip.    Final diagnoses:   Epistaxis       --    [facility] EMERGENCY DEPARTMENT  12/10/2022     Robert Olea MD  12/10/22 0410

## 2022-12-13 ENCOUNTER — NURSE TRIAGE (OUTPATIENT)
Dept: NURSING | Facility: CLINIC | Age: 22
End: 2022-12-13

## 2022-12-13 NOTE — TELEPHONE ENCOUNTER
"Nurse Triage SBAR    Situation: Pt calls with c/o \"I'm having mental break down\"    Background: Patient,Sadaf, calling. Consent: not needed.    Assessment: \"I just got back from the clinic\", \"I'm having flashbacks with my dad\", I'm having difficulty sleeping x 1 week. \"My whole body is shaky\", \"I have chest pain and heart racing\" off/on. \"I was just in the hospital on Saturday and they don't help me\". Pt with hx of psych illness.  Support and reassurance given.  Lives in group home with RN present. Feels safe there. Denies feelings of self harm.  Has 24 hour support person present.  Encouraged to talk with group home RN regarding feelings and follow up with PCP.    Protocol Recommended Disposition: Home care/ Telephone Advise    Recommendation: According to the protocol, Patient should do home care. Home care reviewed. Advised Patient to make an appointment with PCP within 3 days. Care advice given. Patient verbalizes understanding and agrees with plan of care. Reviewed concerning symptoms and when to call back. Pt verbalizes understanding.     Amira Lloyd RN on 12/13/2022 at 4:09 PM    Disposition: See in office within 3 days.    Reason for Disposition    Chest pain    Started on anti-anxiety medication and no relief    Additional Information    Negative: SEVERE difficulty breathing (e.g., struggling for each breath, speaks in single words)    Negative: Difficult to awaken or acting confused (e.g., disoriented, slurred speech)    Negative: Bluish (or gray) lips or face now    Negative: Hysterical or combative behavior    Negative: Sounds like a life-threatening emergency to the triager    Negative: Followed a chest injury    Negative: SEVERE difficulty breathing (e.g., struggling for each breath, speaks in single words)    Negative: Bluish (or gray) lips or face    Negative: Difficult to awaken or acting confused (e.g., disoriented, slurred speech)    Negative: Hysterical or combative behavior    Negative: " Sounds like a life-threatening emergency to the triager    Negative: Difficulty breathing and persists > 10 minutes and not relieved by reassurance provided by triager    Negative: Lightheadedness or dizziness and persists > 10 minutes and not relieved by reassurance provided by triager    Negative: Substance use (drug use) or unhealthy alcohol use, known or suspected, and feeling very shaky (i.e., visible tremors of hands)    Negative: Patient sounds very sick or weak to the triager    Negative: Patient sounds very upset or troubled to the triager    Negative: Symptoms of anxiety or panic and has not been evaluated for this by physician    Negative: Symptoms interfere with work or school    Protocols used: ANXIETY AND PANIC ATTACK-A-AH, ANXIETY AND PANIC ATTACK-A-OH, CHEST PAIN-A-AH

## 2022-12-14 ENCOUNTER — NURSE TRIAGE (OUTPATIENT)
Dept: FAMILY MEDICINE | Facility: CLINIC | Age: 22
End: 2022-12-14

## 2022-12-14 NOTE — TELEPHONE ENCOUNTER
"Patient reporting feeling nauseous after Depo injection this morning.     No swelling, SOB or difficulty breathing, bleeding, pain drinking ok, unable to eat right now due to nausea. States she is not pregnant but a month late getting menstrual period.   Toward end of call patient reports abdominal pain 7/10 for >3 days but did not mention to provider during visit this morning. No other sx reported   Lives in group home and will tell RN at home of sx, states she is waiting for call back from [facility]     Per protocol provided home care advise    Reason for Disposition    Patient wants to be seen    Additional Information    Negative: Abdominal pain and pregnant < 20 weeks    Negative: Vaginal bleeding and pregnant < 20 weeks    Negative: Vaginal discharge is main symptom    Negative: SEVERE vaginal bleeding (e.g., soaking 2 pads or tampons per hour and present 2 or more hours; 1 menstrual cup every 2 hours)    Negative: SEVERE dizziness (e.g., unable to stand, requires support to walk, feels like passing out now)    Negative: Patient sounds very sick or weak to the triager    Negative: SEVERE abdominal pain (e.g., excruciating) and present > 1 hour    Negative: Injection site looks infected (spreading redness, red streak, or pus)    Negative: Redness or red streak around the injection site and begins > 48 hours after shot    Negative: MODERATE vaginal bleeding (e.g., soaking 1 pad or tampon per hour and present > 6 hours; 1 menstrual cup every 6 hours)    Negative: Vaginal bleeding is WORSE than normal (e.g., heavier) and dizziness or lightheadedness    Negative: Constant abdominal pain and present > 2 hours    Answer Assessment - Initial Assessment Questions  1. TYPE: \"What type of birth control shot are you getting?\"  (e.g., Depo-Provera or Depo-subQ Provera 104 shot given every 3 months)      Depo  2. START DATE: \"When did you first start getting the birth control shot?\" (e.g., date; weeks, months, years ago)  When " "was the last shot?\"      >1 year ago  3. SYMPTOM: \"What is the main symptom (or question) you're concerned about?\"      Nausea   4. LOCATION: \"Where is *No Answer* located?\" (e.g., abdomen, inside/outside, right/left)      *No Answer*  5. ONSET: \"When did the *No Answer*start?\"     Nauseous started after shot  6. VAGINAL BLEEDING: \"Are you having any unusual vaginal bleeding?\"    - NONE: no bleeding    - SPOTTING: spotting, or pinkish / brownish mucous discharge; does not fill panty liner or pad     - MILD:  less than 1 pad / hour; less than patient's usual menstrual bleeding    - MODERATE: 1-2 pads / hour; 1 menstrual cup every 6 hours; small-medium blood clots (e.g., pea, grape, small coin)    - SEVERE: soaking 2 or more pads/hour for 2 or more hours; 1 menstrual cup every 2 hours; bleeding not contained by pads or continuous red blood from vagina; large blood clots (e.g., golf ball, large coin)       *No Answer*  7. ABDOMEN OR PELVIC PAIN: \"Are you have any pain in your abdomen or pelvic area?\" (Scale: 0, 1-10; none, mild, moderate, severe)    - NONE (0): no pain    - MILD (1-3): doesn't interfere with normal activities, abdomen soft and not tender to touch     - MODERATE (4-7): interferes with normal activities or awakens from sleep, abdomen tender to touch     - SEVERE (8-10): excruciating pain, doubled over, unable to do any normal activities       States been having pain for past 3 days, 7/10 pain right now but didn't hurt this morning and or mentioned to PCP at appt  8. PREGNANCY: \"Are you concerned you might be pregnant?\" \"When was your last menstrual period?\"      No, but last menstrual period was >1month and havent taken pregnancy test    Protocols used: CONTRACEPTION - BIRTH CONTROL SHOT SYMPTOMS AND [id number] SHAY RN  [facility]    "

## 2022-12-15 ENCOUNTER — HOSPITAL ENCOUNTER (EMERGENCY)
Facility: CLINIC | Age: 22
Discharge: HOME | End: 2022-12-16
Attending: EMERGENCY MEDICINE | Admitting: EMERGENCY MEDICINE
Payer: MEDICARE

## 2022-12-15 DIAGNOSIS — F79 INTELLECTUAL DISABILITY: ICD-10-CM

## 2022-12-15 DIAGNOSIS — F60.3 BORDERLINE PERSONALITY DISORDER (H): ICD-10-CM

## 2022-12-15 LAB
ALBUMIN UR-MCNC: NEGATIVE MG/DL
APPEARANCE UR: ABNORMAL
BACTERIA #/AREA URNS HPF: ABNORMAL /HPF
BILIRUB UR QL STRIP: NEGATIVE
COLOR UR AUTO: ABNORMAL
FLUAV RNA SPEC QL NAA+PROBE: NEGATIVE
FLUBV RNA RESP QL NAA+PROBE: NEGATIVE
GLUCOSE UR STRIP-MCNC: NEGATIVE MG/DL
HCG UR QL: NEGATIVE
HGB UR QL STRIP: NEGATIVE
KETONES UR STRIP-MCNC: NEGATIVE MG/DL
LEUKOCYTE ESTERASE UR QL STRIP: ABNORMAL
NITRATE UR QL: NEGATIVE
PH UR STRIP: 5.5 [PH] (ref 5–7)
RBC URINE: 2 /HPF
RSV RNA SPEC NAA+PROBE: NEGATIVE
SARS-COV-2 RNA RESP QL NAA+PROBE: NEGATIVE
SP GR UR STRIP: 1.01 (ref 1–1.03)
SQUAMOUS EPITHELIAL: 6 /HPF
UROBILINOGEN UR STRIP-MCNC: NORMAL MG/DL
WBC URINE: 4 /HPF

## 2022-12-15 PROCEDURE — 81001 URINALYSIS AUTO W/SCOPE: CPT | Performed by: EMERGENCY MEDICINE

## 2022-12-15 PROCEDURE — C9803 HOPD COVID-19 SPEC COLLECT: HCPCS | Performed by: EMERGENCY MEDICINE

## 2022-12-15 PROCEDURE — 99284 EMERGENCY DEPT VISIT MOD MDM: CPT | Mod: CS | Performed by: EMERGENCY MEDICINE

## 2022-12-15 PROCEDURE — 81025 URINE PREGNANCY TEST: CPT | Performed by: EMERGENCY MEDICINE

## 2022-12-15 PROCEDURE — 99285 EMERGENCY DEPT VISIT HI MDM: CPT | Mod: CS | Performed by: EMERGENCY MEDICINE

## 2022-12-15 PROCEDURE — 87637 SARSCOV2&INF A&B&RSV AMP PRB: CPT | Performed by: EMERGENCY MEDICINE

## 2022-12-15 ASSESSMENT — ACTIVITIES OF DAILY LIVING (ADL)
ADLS_ACUITY_SCORE: 37
ADLS_ACUITY_SCORE: 37

## 2022-12-16 ENCOUNTER — HOSPITAL ENCOUNTER (EMERGENCY)
Facility: CLINIC | Age: 22
Discharge: HOME | End: 2022-12-16
Attending: PSYCHIATRY & NEUROLOGY | Admitting: PSYCHIATRY & NEUROLOGY
Payer: MEDICARE

## 2022-12-16 ENCOUNTER — NURSE TRIAGE (OUTPATIENT)
Dept: NURSING | Facility: CLINIC | Age: 22
End: 2022-12-16

## 2022-12-16 VITALS
HEART RATE: 84 BPM | TEMPERATURE: 97.6 F | SYSTOLIC BLOOD PRESSURE: 141 MMHG | DIASTOLIC BLOOD PRESSURE: 70 MMHG | RESPIRATION RATE: 15 BRPM | OXYGEN SATURATION: 99 %

## 2022-12-16 VITALS
TEMPERATURE: 98.4 F | RESPIRATION RATE: 16 BRPM | OXYGEN SATURATION: 100 % | HEART RATE: 99 BPM | SYSTOLIC BLOOD PRESSURE: 136 MMHG | DIASTOLIC BLOOD PRESSURE: 88 MMHG

## 2022-12-16 DIAGNOSIS — F79 INTELLECTUAL DISABILITY: ICD-10-CM

## 2022-12-16 DIAGNOSIS — F60.3 BORDERLINE PERSONALITY DISORDER (H): ICD-10-CM

## 2022-12-16 PROCEDURE — 250N000013 HC RX MED GY IP 250 OP 250 PS 637: Performed by: PSYCHIATRY & NEUROLOGY

## 2022-12-16 PROCEDURE — 99284 EMERGENCY DEPT VISIT MOD MDM: CPT | Performed by: PSYCHIATRY & NEUROLOGY

## 2022-12-16 PROCEDURE — 90791 PSYCH DIAGNOSTIC EVALUATION: CPT

## 2022-12-16 PROCEDURE — 99285 EMERGENCY DEPT VISIT HI MDM: CPT | Mod: 25 | Performed by: PSYCHIATRY & NEUROLOGY

## 2022-12-16 PROCEDURE — 250N000013 HC RX MED GY IP 250 OP 250 PS 637

## 2022-12-16 RX ORDER — OLANZAPINE 5 MG/1
5 TABLET, ORALLY DISINTEGRATING ORAL ONCE
Status: COMPLETED | OUTPATIENT
Start: 2022-12-16 | End: 2022-12-16

## 2022-12-16 RX ORDER — DIPHENHYDRAMINE HYDROCHLORIDE 50 MG/ML
25 INJECTION INTRAMUSCULAR; INTRAVENOUS
Status: DISCONTINUED | OUTPATIENT
Start: 2022-12-16 | End: 2022-12-16 | Stop reason: HOSPADM

## 2022-12-16 RX ORDER — OLANZAPINE 5 MG/1
TABLET, ORALLY DISINTEGRATING ORAL
Status: COMPLETED
Start: 2022-12-16 | End: 2022-12-16

## 2022-12-16 RX ORDER — HALOPERIDOL 5 MG/ML
5 INJECTION INTRAMUSCULAR
Status: DISCONTINUED | OUTPATIENT
Start: 2022-12-16 | End: 2022-12-16 | Stop reason: HOSPADM

## 2022-12-16 RX ADMIN — OLANZAPINE 5 MG: 5 TABLET, ORALLY DISINTEGRATING ORAL at 17:00

## 2022-12-16 RX ADMIN — OLANZAPINE 5 MG: 5 TABLET, ORALLY DISINTEGRATING ORAL at 16:30

## 2022-12-16 ASSESSMENT — ENCOUNTER SYMPTOMS
HALLUCINATIONS: 0
NEUROLOGICAL NEGATIVE: 1
HYPERACTIVE: 0
MUSCULOSKELETAL NEGATIVE: 1
CONSTITUTIONAL NEGATIVE: 1
DECREASED CONCENTRATION: 1
CARDIOVASCULAR NEGATIVE: 1
RESPIRATORY NEGATIVE: 1
GASTROINTESTINAL NEGATIVE: 1

## 2022-12-16 ASSESSMENT — ACTIVITIES OF DAILY LIVING (ADL)
ADLS_ACUITY_SCORE: 37
ADLS_ACUITY_SCORE: 37

## 2022-12-16 NOTE — ED NOTES
"Patient notified RN that she wanted to go home. RN consulted with MD and was ok with discharge. When RN went to speak with the patient she stated   \"When I go home I'm going to kill myself\" and also began biting herself. PA and RN went hands on with patient while she continued to try and bite herself. Patient then accepted that she would take PO medications to help herself calm. After medication administered patient continued to try and bite herself so Code 21 called. Patient then stopped biting again. MD at beside and ordered more medications. Patient accepted but continued to scream for several minutes on end. Patient will not accept redirection offered   "

## 2022-12-16 NOTE — ED NOTES
Bed: [id number]  Expected date: 12/15/22  Expected time: 7:18 PM  Means of arrival: Ambulance  Comments:  North 6--24 female HA green to triage

## 2022-12-16 NOTE — ED PROVIDER NOTES
[facility] [place] ([facility])    12/15/22      ED PROVIDER NOTE   Hallway 7       History     Chief Complaint   Patient presents with     Mental Health Problem     Generalized Body Aches     Headache and everything hurts.     HPI  Sadaf Ross is a 22 year old female with history of major depressive disorder, PTSD, self-injurious behavior via head banging and cutting, group home resident who presents with headache and generalized body aches. She has a history of recurrent somatic complaints (migraine, poor sleep, and chest pain) that exacerbates her depression.     Patient has [facility] worker, and has been given referral for trauma-informed care (EMDR) with female provider.     The patient says she doesn't want to talk about anything.  She tells me to get away.  Per group home staff the patient has been calling 911 wanting to go to the ER.  She came to the ER twice this past weekend.  She came for a nosebleed, was sent back to the group home, and would pick at her nose to make it bleed again so she could return to the ER. She says she can't sleep and so her trazodone was just stopped and she began lunesta last night.  She slept through the whole night instead of being up and showering, and then was upset this morning because she thought she was dead because she slept.  She ate 6 eggs t his morning and then says she can't it or feels sick from it.  Per group home staff, she seems focused again on coming to the ER, which has been a past issue.  She likes her staff at her group home.       Past Medical History  Past Medical History:   Diagnosis Date     ADHD (attention deficit hyperactivity disorder)      Bipolar 1 disorder (H)      Borderline personality disorder (H)      Depression      Depressive disorder      Intellectual disability      Obesity      Syncope      Past Surgical History:   Procedure Laterality Date     APPENDECTOMY       APPENDECTOMY       acetaminophen (TYLENOL) 325 MG  tablet  alum & mag hydroxide-simethicone (MAALOX  ES) 400-400-40 MG/5ML SUSP suspension  ARIPiprazole lauroxil ER (ARISTADA) 882 MG/3.2ML intra-muscular  benztropine (COGENTIN) 1 MG tablet  calcium carbonate (TUMS) 500 MG chewable tablet  chlorhexidine (PERIDEX) 0.12 % solution  clobetasol (TEMOVATE) 0.05 % CREA cream  cyclobenzaprine (FLEXERIL) 10 MG tablet  diclofenac (VOLTAREN) 1 % topical gel  docusate sodium (COLACE) 50 MG capsule  fluticasone (FLONASE) 50 MCG/ACT nasal spray  guaiFENesin (ROBITUSSIN) 100 MG/5ML SYRP  hydrocortisone (CORTAID) 0.5 % external cream  hydrOXYzine (ATARAX) 50 MG tablet  hyoscyamine (LEVSIN/SL) 0.125 MG sublingual tablet  ibuprofen (ADVIL/MOTRIN) 400 MG tablet  loperamide (IMODIUM) 2 MG capsule  LORazepam (ATIVAN) 0.5 MG tablet  metoclopramide (REGLAN) 10 MG tablet  multivitamin, therapeutic (THERA-VIT) TABS tablet  omeprazole (PRILOSEC) 40 MG DR capsule  ondansetron (ZOFRAN) 4 MG tablet  polyethylene glycol (MIRALAX) 17 GM/Dose powder  prochlorperazine (COMPAZINE) 10 MG tablet  promethazine (PHENERGAN) 12.5 MG tablet  sennosides (SENOKOT) 8.6 MG tablet  traZODone (DESYREL) 50 MG tablet  venlafaxine (EFFEXOR-ER) 225 MG 24 hr tablet  Vitamin D, Cholecalciferol, 25 MCG (1000 UT) TABS      Allergies   Allergen Reactions     Penicillins Rash and Unknown     Family History  Family History   Problem Relation Age of Onset     Diabetes Type 1 Father      Cancer Paternal Grandfather      Social History   Social History     Tobacco Use     Smoking status: Every Day     Packs/day: 0.50     Years: 5.00     Pack years: 2.50     Types: Vaping Device, Cigarettes     Smokeless tobacco: Never   Substance Use Topics     Alcohol use: No     Drug use: No      Past medical history, past surgical history, medications, allergies, family history, and social history were reviewed with the patient. No additional pertinent items.       Review of Systems   Psychiatric/Behavioral: Positive for self-injury  (scratching superficially on her forearms) and suicidal ideas (chronic).   All other systems reviewed and are negative.    A complete review of systems was performed with pertinent positives and negatives noted in the HPI, and all other systems negative.    Physical Exam   BP: (!) 141/70  Pulse: 84  Temp: 97.6  F (36.4  C)  Resp: 15  SpO2: 99 %  Physical Exam  Vitals and nursing note reviewed.   Constitutional:       Appearance: She is obese.   HENT:      Head: Normocephalic and atraumatic.      Nose: No congestion or rhinorrhea.   Cardiovascular:      Rate and Rhythm: Normal rate.   Pulmonary:      Effort: Pulmonary effort is normal.   Musculoskeletal:         General: Signs of injury (faint scratch marks on forearm.  ) present.      Cervical back: Normal range of motion.   Skin:     Coloration: Skin is not jaundiced or pale.      Findings: No bruising, erythema, lesion or rash.   Neurological:      General: No focal deficit present.      Mental Status: She is alert and oriented to person, place, and time.   Psychiatric:         Attention and Perception: Attention and perception normal.         Mood and Affect: Mood and affect normal.         Speech: Speech normal.         Behavior: Behavior is uncooperative.         Thought Content: Thought content normal.         Cognition and Memory: Cognition is impaired.         Judgment: Judgment is impulsive and inappropriate.         ED Course      Procedures       The medical record was reviewed and interpreted.  Mental Health Risk Assessment      PSS-3    Date and Time Over the past 2 weeks have you felt down, depressed, or hopeless? Over the past 2 weeks have you had thoughts of killing yourself? Have you ever attempted to kill yourself? When did this last happen? User   12/15/22 1927 yes yes no --       C-SSRS ([place])    Date and Time Q1 Wished to be Dead (Past Month) Q2 Suicidal Thoughts (Past Month) Q3 Suicidal Thought Method Q4 Suicidal Intent without Specific  Plan Q5 Suicide Intent with Specific Plan Q6 Suicide Behavior (Lifetime) Within the Past 3 Months? RETIRED: Level of Risk per Screen Screening Not Complete User   12/15/22 1927 yes yes no yes no yes -- -- -- JN                Item Assessment   Suicidal Ideation Suicidal thoughts   Plan none   Intent none   Suicidal or self-harm behaviors scratching faintly on forearm with nails   Risk Factors Previous psychiatric diagnosis and treatments   Protective Factors Supportive social network of family and/or friends              Results for orders placed or performed during the hospital encounter of 12/15/22   Asymptomatic Influenza A/B & SARS-CoV2 (COVID-19) Virus PCR Multiplex Nasopharyngeal     Status: Normal    Specimen: Nasopharyngeal; Swab   Result Value Ref Range    Influenza A PCR Negative Negative    Influenza B PCR Negative Negative    RSV PCR Negative Negative    SARS CoV2 PCR Negative Negative    Narrative    Testing was performed using the Xpert Xpress CoV2/Flu/RSV Assay on the Cepheid GeneXpert Instrument. This test should be ordered for the detection of SARS-CoV-2 and influenza viruses in individuals who meet clinical and/or epidemiological criteria. Test performance is unknown in asymptomatic patients. This test is for in vitro diagnostic use under the FDA EUA for laboratories certified under CLIA to perform high or moderate complexity testing. This test has not been FDA cleared or approved. A negative result does not rule out the presence of PCR inhibitors in the specimen or target RNA in concentration below the limit of detection for the assay. If only one viral target is positive but coinfection with multiple targets is suspected, the sample should be re-tested with another FDA cleared, approved, or authorized test, if coinfection would change clinical management. This test was validated by the [facility]. These laboratories are certified under the Clinical Laboratory Improvement  Amendments of 1988 (CLIA-88) as qualified to perform high complexity laboratory testing.   UA with Microscopic reflex to Culture     Status: Abnormal    Specimen: Urine, Midstream   Result Value Ref Range    Color Urine Straw Colorless, Straw, Light Yellow, Yellow    Appearance Urine Slightly Cloudy (A) Clear    Glucose Urine Negative Negative mg/dL    Bilirubin Urine Negative Negative    Ketones Urine Negative Negative mg/dL    Specific Gravity Urine 1.007 1.003 - 1.035    Blood Urine Negative Negative    pH Urine 5.5 5.0 - 7.0    Protein Albumin Urine Negative Negative mg/dL    Urobilinogen Urine Normal Normal, 2.0 mg/dL    Nitrite Urine Negative Negative    Leukocyte Esterase Urine Trace (A) Negative    Bacteria Urine Few (A) None Seen /HPF    RBC Urine 2 <=2 /HPF    WBC Urine 4 <=5 /HPF    Squamous Epithelials Urine 6 (H) <=1 /HPF    Narrative    Urine Culture not indicated   HCG qualitative urine (UPT)     Status: Normal   Result Value Ref Range    hCG Urine Qualitative Negative Negative     Medications - No data to display     Assessments & Plan (with Medical Decision Making)   Sadaf Ross is a 22 year old female with history of major depressive disorder, PTSD, self-injurious behavior via head banging and cutting, group home resident who presents with headache and generalized body aches. She has a history of recurrent somatic complaints (migraine, poor sleep, and chest pain) who presents to the ED saying she is suicidal.  She is well known to the emergency department and seeks being here.  She has a care plan that recommends returning to her group home promptly if at baseline.  She is chronically suicidal and is at her baseline today.  She becomes frustrated when being told she is returning which is also her baseline.  Staff at her group home states the patient seeks to come to the ED.   The patient tells me she is ready to go back to the group home and group home staff are comfortable with her return.   She may repeat this pattern several times and come back to the ED again based on prior visits hx but she was encouraged to return home and work on her coping skills when upset. ua was checked due to saying she had myalgias and does not suggest uti.     I have reviewed the nursing notes. I have reviewed the findings, diagnosis, plan and need for follow up with the patient.    New Prescriptions    No medications on file       Final diagnoses:   Borderline personality disorder (H)   Intellectual disability       --  Ashely Toth MD  [facility] EMERGENCY DEPARTMENT  12/15/2022     Ashely Toth MD  12/15/22 [id number]       Ashely Toth MD  12/28/22 [id number]

## 2022-12-16 NOTE — ED TRIAGE NOTES
Triage Assessment     Row Name 12/15/22 1925       Respiratory WDL    Respiratory WDL WDL       Cardiac WDL    Cardiac WDL X  Complaining of chest pain       Cognitive/Neuro/Behavioral WDL    Cognitive/Neuro/Behavioral WDL X  Complaining of headache.    Level of Consciousness alert    Orientation oriented x 4    Mood/Behavior calm;cooperative

## 2022-12-16 NOTE — ED NOTES
Writer reported self injurious behavior to RN and the RN reported to doctor after continuous deescalating tactics.

## 2022-12-16 NOTE — ED TRIAGE NOTES
"     Triage Assessment     Row Name 12/16/22 6989       Triage Assessment (Adult)    Airway WDL WDL       Respiratory WDL    Respiratory WDL WDL       Skin Circulation/Temperature WDL    Skin Circulation/Temperature WDL WDL       Peripheral/Neurovascular WDL    Peripheral Neurovascular WDL WDL       Cognitive/Neuro/Behavioral WDL    Cognitive/Neuro/Behavioral WDL WDL                Pt states she doesn't feel safe and is feeling suicidal and states, \"I am cutting myself with my fingernails and head banging.\"  "

## 2022-12-16 NOTE — DISCHARGE INSTRUCTIONS
Discharge back to your group home.     Continue working with your current providers.     Continue taking your medications as prescribed.

## 2022-12-16 NOTE — CONSULTS
"Diagnostic Evaluation Consultation  Crisis Assessment    Patient was assessed: In Person  Patient location: [facility] ED   Was a release of information signed: No. Reason: declined      Referral Data and Chief Complaint  Sadaf is a 22 year old, who uses she/her pronouns, and presents to the ED via EMS. Patient is referred to the ED by self. Patient is presenting to the ED for the following concerns: suicidal ideation.      Informed Consent and Assessment Methods     Patient is her own guardian. Writer met with patient and explained the crisis assessment process, including applicable information disclosures and limits to confidentiality. The patient was unwilling to participate in a formal crisis assessment because pt was frustrated and irritable. Due to this, assessment methods were limited to review of medical records, collaboration with medical staff, and obtaining relevant collateral information from family and community providers when available..     Over the course of this crisis assessment provided reassurance and offered validation. Patient's response to interventions was non-responsive     Summary of Patient Situation     Pt presents to ED for concerns of suicidal ideation. Should be noted that this pt is a frequent flyer and well known in the emergency department. Pt was seen in the emergency department yesterday (12/15/22) for concerns of body aches and increased depression. Pt was discharged back to group home with EMDR referral. Prior to assessment (per RN note on 12/16/22)...     Patient notified RN that she wanted to go home. RN consulted with MD and was ok with discharge. When RN went to speak with the patient she stated   \"When I go home I'm going to kill myself\" and also began biting herself. PA and RN went hands on with patient while she continued to try and bite herself. Patient then accepted that she would take PO medications to help herself calm. After medication administered patient continued to " try and bite herself so Code 21 called. Patient then stopped biting again. MD at beside and ordered more medications. Patient accepted but continued to scream for several minutes on end. Patient will not accept redirection offered.     Writer attempted to conduct mental health assessment, but pt acknowledged that she will be refusing to speak with . At this time, pt will be observed in emergency department until a clinical disposition can be decided upon.     Pt also has a care coordination note that reads as the following...     The pt has become increasingly disruptive in the Emergency Department.  The pt will yell, demand, and scream and disrupt the milieu, and this happens before she finds out the recommendation to send her back to her group home.  Today, the pt postured toward the ED doctor and threatened to  punch him.   During one of her ED visits over the past week, the pt befriended another patient who is waiting for an inpatient bed.  The pt has been texting and contacting this patient.  There is concern that one of the reasons the pt is coming to the ED is to interact with this patient.        An Emergency Department Care Plan is being sought in order to reduce and extinguish the pt s numerous ED visits.  The pt does have an active outpatient team that the pt can utilize for support and lives in a group home with 24/7 staffing.  Typically, the pt calls 911, not group home staff.  Additionally, LMs have attempted to recommend additionally services, such as day treatment, and the pt refuses the recommendations.  It appears that the pt comes to the ED, requesting admission as she does not like her group home.        To that end, it is recommended that if the pt returns to the Emergency Department, she be triaged and if there is not any acute new symptoms, both medically and mental health-wise, the group home be called and informed that the pt is returning and medical transport be set up for her  return.  If the pt becomes dysregulated, the pt should be offered/given appropriate medications.  This should not hinder her return to her group home.        9/27/22, Nuria Deleon, [facility]; Dr. Luis De Anda; Meche Butts [facility]      Brief Psychosocial History     Pt currently lives in a group home. Has lived here for two years. Prior to this group home, pt was at [facility] for 3 months. It was reported by group home staff that pt stays focused on visiting the ER, which has been an issue in the past. Pt graduated from [employer] and had an IEP. Historically, pt has reported having numerous supports including her grandmas, uncle, and group home staff. Pt is [employer] and enjoys speaking with God. Pt lost her father in February and has been sad about this.     Significant Clinical History     Per assessment consult completed on 9/24/22....     Pt has multiple ED visits in the past week, 09/22/22, 09/21/22, 09/19/22-09/21/22, 09/17/22, and 09/16/22 as well as previous visits prior.   Pt has a hx of inpatient hospitalizations with the last one being on 11/21/2021 at [facility]. Pt has hx of dx of Bipolar 1, Depression, and Borderline Personality Disorder. Pt has a PCP Jess Wilkins MD - [facility], psychiatrist KAPIL Galindorom, and therapist Rd Barton MA, [facility] - [facility].        Collateral Information    Contacted group home staff at [phone] but did not answer.      Risk Assessment  Unable to complete [place] assessment as pt was non-responsive and not able to get into contact with collateral.      Does the patient have thoughts of harming others? Unclear. Although, pt has history of aggression in the ED.      Is the patient engaging in sexually inappropriate behavior?  no        Current Substance Abuse     Is there recent substance abuse? no     Was a urine drug screen or blood alcohol level obtained: No       Mental Status Exam     Affect: Flat   Appearance: Appropriate    Attention  Span/Concentration: Inattentive  Eye Contact: Avoidant   Fund of Knowledge: Delayed    Language /Speech Content: Other: did not speak to    Language /Speech Volume: Other: did not speak to     Language /Speech Rate/Productions: Minimally Responsive    Recent Memory: Other: n/a   Remote Memory: Other: n/a   Mood: Sad    Orientation to Person: Yes    Orientation to Place: Yes   Orientation to Time of Day: Yes    Orientation to Date: Yes    Situation (Do they understand why they are here?): Yes    Psychomotor Behavior: Underactive      History of commitment: No         Medication    Psychotropic medications: Yes. Pt is currently taking   Current Facility-Administered Medications   Medication     diphenhydrAMINE (BENADRYL) injection 25 mg     haloperidol lactate (HALDOL) injection 5 mg     Current Outpatient Medications   Medication     acetaminophen (TYLENOL) 325 MG tablet     alum & mag hydroxide-simethicone (MAALOX  ES) 400-400-40 MG/5ML SUSP suspension     ARIPiprazole lauroxil ER (ARISTADA) 882 MG/3.2ML intra-muscular     benztropine (COGENTIN) 1 MG tablet     calcium carbonate (TUMS) 500 MG chewable tablet     chlorhexidine (PERIDEX) 0.12 % solution     clobetasol (TEMOVATE) 0.05 % CREA cream     cyclobenzaprine (FLEXERIL) 10 MG tablet     diclofenac (VOLTAREN) 1 % topical gel     docusate sodium (COLACE) 50 MG capsule     fluticasone (FLONASE) 50 MCG/ACT nasal spray     guaiFENesin (ROBITUSSIN) 100 MG/5ML SYRP     hydrocortisone (CORTAID) 0.5 % external cream     hydrOXYzine (ATARAX) 50 MG tablet     hyoscyamine (LEVSIN/SL) 0.125 MG sublingual tablet     ibuprofen (ADVIL/MOTRIN) 400 MG tablet     loperamide (IMODIUM) 2 MG capsule     LORazepam (ATIVAN) 0.5 MG tablet     metoclopramide (REGLAN) 10 MG tablet     multivitamin, therapeutic (THERA-VIT) TABS tablet     omeprazole (PRILOSEC) 40 MG DR capsule     ondansetron (ZOFRAN) 4 MG tablet     polyethylene glycol (MIRALAX) 17 GM/Dose powder      prochlorperazine (COMPAZINE) 10 MG tablet     promethazine (PHENERGAN) 12.5 MG tablet     sennosides (SENOKOT) 8.6 MG tablet     traZODone (DESYREL) 50 MG tablet     venlafaxine (EFFEXOR-ER) 225 MG 24 hr tablet     Vitamin D, Cholecalciferol, 25 MCG (1000 UT) TABS    Medication compliant: Yes. Recent medication changes: No  Medication changes made in the last two weeks: No       Current Care Team    Primary Care Provider: Jess Wilkins MD - [facility]  Psychiatrist: Emma Jacobson NP - [facility]  Therapist: Rd Barton MA, [facility] - [facility]  : Yes     Other: Yes, Group home      Diagnosis  Bipolar I disorder, Current or most recent episode depressed, Severe F31.4 - Primary, By history   Borderline personality disorder F60.3 - By history   Unspecified intellectual disability (intellectual developmental disorder) F79 - By history       Clinical Summary and Substantiation of Recommendations    At this time, pt is refusing to speak with  and engaging in self injurious behaviors (biting self) while in the emergency department. Pt was given medications with the intention of stabilization. Pt will continue to be observed in the emergency department until they can be reassessed.      Disposition    Recommended disposition: Other: observation until available for reassessment       Reviewed case and recommendations with attending provider. Attending Name: Dr. Sena       Attending concurs with disposition: Yes       Patient concurs with disposition: No: Pt did not offer thoughts on their preferred disposition       Guardian concurs with disposition: NA      Final disposition: Other: observation until available for reassessment    Assessment Details    Patient interview started at: 5:30 pm and completed at: 6:00 pm.     Total duration spent on the patient case in minutes: .50 hrs      CPT code(s) utilized: 53440 - Psychotherapy for Crisis - 60 (30-74*) min       CHERI Guajardo, Psychotherapist Trainee,  Psychotherapist  [facility] - Triage & Transition Services  Callback: [phone]

## 2022-12-16 NOTE — TELEPHONE ENCOUNTER
"Triage call:  Patient calling in to report L arm pain.  She reports her left arm is itchy & has been scratching it continuously.  She denies any injury to her arm, but reports that her L arm is burning & feels like a \"knife was put into her arm.\"  The patient reports taking ibuprofen, but reports it made her \"lightheaded & dizzy.\"    Also, the patient is reporting chest pain 7/10 that is radiating down her left arm.     The patient is also reporting suicidal ideation.    Of note, the patient was in the ER last night & has had recent office visits this past week.  Per chart, the patient has ADHA, bipolar disporder, depression, & an intellectual disability.    Per protocol, since the patient is reporting chest pain & suicidal ideation, writer advised patient to go to nearest ED.    Enid Mcdaniels RN on 12/16/2022 at 1:41 PM    Reason for Disposition    Chest pain lasting longer than 5 minutes    Additional Information    Negative: Shock suspected (e.g., cold/pale/clammy skin, too weak to stand, low BP, rapid pulse)    Negative: Similar pain previously and it was from 'heart attack'    Negative: Similar pain previously from 'angina' and not relieved by nitroglycerin    Negative: Sounds like a life-threatening emergency to the triager    Negative: Followed an injury to arm    Negative: Difficulty breathing or unusual sweating (e.g., sweating without exertion)    Protocols used: ARM PAIN-A-OH      "

## 2022-12-17 ENCOUNTER — NURSE TRIAGE (OUTPATIENT)
Dept: NURSING | Facility: CLINIC | Age: 22
End: 2022-12-17

## 2022-12-17 NOTE — ED PROVIDER NOTES
ED Provider Note  [facility]      History     Chief Complaint   Patient presents with     Suicidal     Pt has superficial cuts on left forearm       HPI  Sadaf Ross is a 22 year old female who is here from her group home where she engaged in superficial cutting. She made comments about suicide. Patient is well-known to us due to her frequent ED visits. She has had over 70 visits to the ED since the beginning of the year. She was last seen here yesterday. Patient enjoys coming the ED and wanting admission, citing suicidal thinking in order to get what she wants. She appears chronically suicidal. She has mul;tiple services in the community.    On arrival today, she started to feel better and asked to return to her group home. She was offered a dose of Zyprexa at the time. She then told the nurse that she intends on killing herself. She then needed to be assessed. She started to act out, banging her head and was yelling and screaming, necessitating a behavioral code which was called off when she told a dose of Zyprexa 5 mg. She was subsequently given another dose due to her agigated state.    Patient refused to engage with the . I then had gone to see her and she appeared sedated. She admits to being tired. She did not commit to being safe but her response is expected. She appears at baseline.    PERSONAL MEDICAL HISTORY  Past Medical History:   Diagnosis Date     ADHD (attention deficit hyperactivity disorder)      Bipolar 1 disorder (H)      Borderline personality disorder (H)      Depression      Depressive disorder      Intellectual disability      Obesity      Syncope      PAST SURGICAL HISTORY  Past Surgical History:   Procedure Laterality Date     APPENDECTOMY       APPENDECTOMY       FAMILY HISTORY  Family History   Problem Relation Age of Onset     Diabetes Type 1 Father      Cancer Paternal Grandfather      SOCIAL HISTORY  Social History     Tobacco Use     Smoking  status: Every Day     Packs/day: 0.50     Years: 5.00     Pack years: 2.50     Types: Vaping Device, Cigarettes     Smokeless tobacco: Never   Substance Use Topics     Alcohol use: No     MEDICATIONS  Current Facility-Administered Medications   Medication     diphenhydrAMINE (BENADRYL) injection 25 mg     haloperidol lactate (HALDOL) injection 5 mg     Current Outpatient Medications   Medication     acetaminophen (TYLENOL) 325 MG tablet     alum & mag hydroxide-simethicone (MAALOX  ES) 400-400-40 MG/5ML SUSP suspension     ARIPiprazole lauroxil ER (ARISTADA) 882 MG/3.2ML intra-muscular     benztropine (COGENTIN) 1 MG tablet     calcium carbonate (TUMS) 500 MG chewable tablet     chlorhexidine (PERIDEX) 0.12 % solution     clobetasol (TEMOVATE) 0.05 % CREA cream     cyclobenzaprine (FLEXERIL) 10 MG tablet     diclofenac (VOLTAREN) 1 % topical gel     docusate sodium (COLACE) 50 MG capsule     fluticasone (FLONASE) 50 MCG/ACT nasal spray     guaiFENesin (ROBITUSSIN) 100 MG/5ML SYRP     hydrocortisone (CORTAID) 0.5 % external cream     hydrOXYzine (ATARAX) 50 MG tablet     hyoscyamine (LEVSIN/SL) 0.125 MG sublingual tablet     ibuprofen (ADVIL/MOTRIN) 400 MG tablet     loperamide (IMODIUM) 2 MG capsule     LORazepam (ATIVAN) 0.5 MG tablet     metoclopramide (REGLAN) 10 MG tablet     multivitamin, therapeutic (THERA-VIT) TABS tablet     omeprazole (PRILOSEC) 40 MG DR capsule     ondansetron (ZOFRAN) 4 MG tablet     polyethylene glycol (MIRALAX) 17 GM/Dose powder     prochlorperazine (COMPAZINE) 10 MG tablet     promethazine (PHENERGAN) 12.5 MG tablet     sennosides (SENOKOT) 8.6 MG tablet     traZODone (DESYREL) 50 MG tablet     venlafaxine (EFFEXOR-ER) 225 MG 24 hr tablet     Vitamin D, Cholecalciferol, 25 MCG (1000 UT) TABS     ALLERGIES  Allergies   Allergen Reactions     Penicillins Rash and Unknown        Review of Systems   Constitutional: Negative.    HENT: Negative.    Respiratory: Negative.    Cardiovascular:  Negative.    Gastrointestinal: Negative.    Genitourinary: Negative.    Musculoskeletal: Negative.    Skin: Negative.    Neurological: Negative.    Psychiatric/Behavioral: Positive for behavioral problems, decreased concentration, self-injury and suicidal ideas. Negative for hallucinations. The patient is not hyperactive.    All other systems reviewed and are negative.        Physical Exam   BP: 136/88  Pulse: 99  Temp: 98.4  F (36.9  C)  Resp: 16  SpO2: 100 %  Physical Exam  Vitals and nursing note reviewed.   HENT:      Head: Normocephalic.   Eyes:      Pupils: Pupils are equal, round, and reactive to light.   Pulmonary:      Effort: Pulmonary effort is normal.   Musculoskeletal:         General: Normal range of motion.      Cervical back: Normal range of motion.   Neurological:      General: No focal deficit present.      Mental Status: She is alert.   Psychiatric:         Attention and Perception: Attention and perception normal. She does not perceive auditory or visual hallucinations.         Mood and Affect: Mood normal. Affect is blunt.         Speech: Speech normal.         Behavior: Behavior is uncooperative. Behavior is not agitated, aggressive, hyperactive or combative.         Thought Content: Thought content normal. Thought content is not paranoid or delusional. Thought content does not include homicidal or suicidal ideation.         Cognition and Memory: Cognition and memory normal.         Judgment: Judgment normal.         ED Course      Procedures       Mental Health Risk Assessment      PSS-3    Date and Time Over the past 2 weeks have you felt down, depressed, or hopeless? Over the past 2 weeks have you had thoughts of killing yourself? Have you ever attempted to kill yourself? When did this last happen? User   12/16/22 1543 yes yes yes -- REAG      C-SSRS ([place])    Date and Time Q1 Wished to be Dead (Past Month) Q2 Suicidal Thoughts (Past Month) Q3 Suicidal Thought Method Q4 Suicidal Intent  without Specific Plan Q5 Suicide Intent with Specific Plan Q6 Suicide Behavior (Lifetime) Within the Past 3 Months? RETIRED: Level of Risk per Screen Screening Not Complete User   12/16/22 1543 yes yes yes no yes yes -- -- -- SLG              Suicide assessment completed by mental health (D.E.C., LCSW, etc.)       Results for orders placed or performed during the hospital encounter of 12/15/22   Asymptomatic Influenza A/B & SARS-CoV2 (COVID-19) Virus PCR Multiplex Nasopharyngeal     Status: Normal    Specimen: Nasopharyngeal; Swab   Result Value Ref Range    Influenza A PCR Negative Negative    Influenza B PCR Negative Negative    RSV PCR Negative Negative    SARS CoV2 PCR Negative Negative    Narrative    Testing was performed using the Xpert Xpress CoV2/Flu/RSV Assay on the Cepheid GeneXpert Instrument. This test should be ordered for the detection of SARS-CoV-2 and influenza viruses in individuals who meet clinical and/or epidemiological criteria. Test performance is unknown in asymptomatic patients. This test is for in vitro diagnostic use under the FDA EUA for laboratories certified under CLIA to perform high or moderate complexity testing. This test has not been FDA cleared or approved. A negative result does not rule out the presence of PCR inhibitors in the specimen or target RNA in concentration below the limit of detection for the assay. If only one viral target is positive but coinfection with multiple targets is suspected, the sample should be re-tested with another FDA cleared, approved, or authorized test, if coinfection would change clinical management. This test was validated by the [email or web address]. These laboratories are certified under the Clinical Laboratory Improvement Amendments of 1988 (CLIA-88) as qualified to perform high complexity laboratory testing.   UA with Microscopic reflex to Culture     Status: Abnormal    Specimen: Urine, Midstream   Result Value Ref Range    Color  Urine Straw Colorless, Straw, Light Yellow, Yellow    Appearance Urine Slightly Cloudy (A) Clear    Glucose Urine Negative Negative mg/dL    Bilirubin Urine Negative Negative    Ketones Urine Negative Negative mg/dL    Specific Gravity Urine 1.007 1.003 - 1.035    Blood Urine Negative Negative    pH Urine 5.5 5.0 - 7.0    Protein Albumin Urine Negative Negative mg/dL    Urobilinogen Urine Normal Normal, 2.0 mg/dL    Nitrite Urine Negative Negative    Leukocyte Esterase Urine Trace (A) Negative    Bacteria Urine Few (A) None Seen /HPF    RBC Urine 2 <=2 /HPF    WBC Urine 4 <=5 /HPF    Squamous Epithelials Urine 6 (H) <=1 /HPF    Narrative    Urine Culture not indicated   HCG qualitative urine (UPT)     Status: Normal   Result Value Ref Range    hCG Urine Qualitative Negative Negative     Medications   haloperidol lactate (HALDOL) injection 5 mg (has no administration in time range)   diphenhydrAMINE (BENADRYL) injection 25 mg (has no administration in time range)   OLANZapine zydis (zyPREXA) ODT tab 5 mg (5 mg Oral Given 12/16/22 1630)   OLANZapine zydis (zyPREXA) ODT tab 5 mg (5 mg Oral Given 12/16/22 1700)        Assessments & Plan (with Medical Decision Making)   Patient is here for a mental health assessment. She has intellectual disability and limited frustration tolerance and poor coping skills. She tends to seek going to the ED, preferring admission which has been deemed detrimental to her ability to cope in the community and enabling her behavior. She appears at baseline here. As she is now medicated and tired, she will be sent back to her group home. They are accepting of her return. Patient is encouraged to follow-up established care and services.    I have reviewed the nursing notes. I have reviewed the findings, diagnosis, plan and need for follow up with the patient.    New Prescriptions    No medications on file       Final diagnoses:   Intellectual disability   Borderline personality disorder (H)        --  Magno Sena MD  [facility] EMERGENCY DEPARTMENT  12/16/2022     Magno Sena MD  12/16/22 1936

## 2022-12-17 NOTE — TELEPHONE ENCOUNTER
"Knife to arms, cut  Is size of pencil or pen, left arm  Nurse Triage SBAR    Is this a 2nd Level Triage? NO    Situation: Patient calling with desire to further harm herself..  Consent: not needed    Background: Pt has hx of self harm. Recommended to call 911 and patient states \" I am not able to do that anymore: the reason is that she has called so many times before that she is not able to call 911     Assessment: The group home is aware that she is cutting and they gave her a choice of going to ER or staying there at the home. She states that she will call EMS.     Protocol Recommended Disposition:   Call  Now    Recommendation: Advised patient to Call 911 . Reviewed concerning symptoms and when to call back.     NA    Does the patient meet one of the following criteria for ADS visit consideration? 16+ years old, with an [facility] PCP     TIP  Providers, please consider if this condition is appropriate for management at one of our Acute and Diagnostic Services sites.     If patient is a good candidate, please use dotphrase <dot>triageresponse and select Refer to ADS to document.         Vianey Camargo RN [facility] 12/17/2022 1:27 PM                              Reason for Disposition    [1] Self-injury (e.g., cutting, self-harm) AND [2] suicidal or out-of-control    Protocols used: CUTS AND [id number]      "

## 2022-12-19 ENCOUNTER — HOSPITAL ENCOUNTER (EMERGENCY)
Facility: CLINIC | Age: 22
Discharge: HOME | End: 2022-12-19
Attending: EMERGENCY MEDICINE | Admitting: EMERGENCY MEDICINE
Payer: MEDICARE

## 2022-12-19 VITALS
HEART RATE: 71 BPM | OXYGEN SATURATION: 99 % | SYSTOLIC BLOOD PRESSURE: 131 MMHG | DIASTOLIC BLOOD PRESSURE: 74 MMHG | RESPIRATION RATE: 18 BRPM | TEMPERATURE: 98.1 F

## 2022-12-19 DIAGNOSIS — F79 INTELLECTUAL DISABILITY: ICD-10-CM

## 2022-12-19 DIAGNOSIS — F60.3 BORDERLINE PERSONALITY DISORDER (H): ICD-10-CM

## 2022-12-19 PROCEDURE — 99284 EMERGENCY DEPT VISIT MOD MDM: CPT | Performed by: EMERGENCY MEDICINE

## 2022-12-19 PROCEDURE — 250N000013 HC RX MED GY IP 250 OP 250 PS 637: Performed by: EMERGENCY MEDICINE

## 2022-12-19 PROCEDURE — 99283 EMERGENCY DEPT VISIT LOW MDM: CPT | Performed by: EMERGENCY MEDICINE

## 2022-12-19 RX ORDER — OLANZAPINE 10 MG/1
10 TABLET, ORALLY DISINTEGRATING ORAL ONCE
Status: COMPLETED | OUTPATIENT
Start: 2022-12-19 | End: 2022-12-19

## 2022-12-19 RX ADMIN — OLANZAPINE 10 MG: 10 TABLET, ORALLY DISINTEGRATING ORAL at 18:56

## 2022-12-19 ASSESSMENT — ENCOUNTER SYMPTOMS
SLEEP DISTURBANCE: 0
HYPERACTIVE: 0
HALLUCINATIONS: 0

## 2022-12-19 ASSESSMENT — ACTIVITIES OF DAILY LIVING (ADL): ADLS_ACUITY_SCORE: 37

## 2022-12-20 NOTE — ED TRIAGE NOTES
BIB EMS from  for SIB scratches to L forearm with intent to end her life, per pt. Pt reports taking PRN hydroxyzine, and ativan prior to leaving  with EMS.    Pt also endorsing GI upset, reporting x3 bouts of emesis this AM.     Triage Assessment     Row Name 12/19/22 3657       Triage Assessment (Adult)    Airway WDL WDL       Respiratory WDL    Respiratory WDL WDL       Skin Circulation/Temperature WDL    Skin Circulation/Temperature WDL WDL       Cardiac WDL    Cardiac WDL WDL       Peripheral/Neurovascular WDL    Peripheral Neurovascular WDL WDL       Cognitive/Neuro/Behavioral WDL    Cognitive/Neuro/Behavioral WDL X;mood/behavior    Level of Consciousness alert    Arousal Level opens eyes spontaneously    Orientation oriented x 4    Speech clear;spontaneous    Mood/Behavior anxious;labile       Pupils (CN II)    Pupil PERRLA yes       [place] Coma Scale    Best Eye Response 4-->(E4) spontaneous    Best Motor Response 6-->(M6) obeys commands    Best Verbal Response 5-->(V5) oriented    [place] Coma Scale Score 15

## 2022-12-20 NOTE — ED NOTES
"Pt scratching at L forearm with fingernails. Staff intervening, redirecting, covering pt's forearm with their hand. When asked pt states \"I just want to die,\" and \"I have no hope.\" Writer offered emotional support. 10mg zyprexa ODT administered- pt accepted.   "

## 2022-12-20 NOTE — DISCHARGE INSTRUCTIONS
Return to your group home via ambulance and continue with your current outpatient resources.     Continue taking your medications as prescribed.

## 2022-12-20 NOTE — ED NOTES
Bed: UREDH-C  Expected date:   Expected time:   Means of arrival:   Comments:  Fadi 721      22 F from group home  SI

## 2022-12-20 NOTE — ED PROVIDER NOTES
"    [facility] EMERGENCY DEPARTMENT ([facility])     December 19, 2022      History     Chief Complaint   Patient presents with     Self Harm - Deliberate     From  for SIB- superficial scratches to forearms and biting hands. Pt also suicidal, per EMS.     HPI  Sadaf Ross is a 22 year old female with a past medical history significant for bipolar 1 disorder, major depressive disorder, PTSD, self-injurious behavior via head banging and cutting who presents to the Emergency Department for mental health evaluation.  When asked what happens she briefly looks up from the phone she is on and says \"same thing\" and hold out to forearm to display superficial rub marks. She then goes back to being on her phone.  Staff at her group home says she has been planning to come to the hospital all day.  The staff tried to work with her and Sadaf had agreed to eat something and then just go to bed.  The next thing the staff knew, the patient had called 911 again and knows that to say to get the ems personnel to come.  She says the patient is chronically suicide and nothing was different today.  She also says the patient intentionally sticks her fingers down her throat to make herself throw up. The patient said she vomited 3 times this morning.      She tells triage that she scratched to end her life.  She resides at a group home. She also says she took prn hydroxyzine and ativan prior to coming.  She also says she has an upset stomach and threw up 3 times this morning.       Past Medical History  Past Medical History:   Diagnosis Date     ADHD (attention deficit hyperactivity disorder)      Bipolar 1 disorder (H)      Borderline personality disorder (H)      Depression      Depressive disorder      Intellectual disability      Obesity      Syncope      Past Surgical History:   Procedure Laterality Date     APPENDECTOMY       APPENDECTOMY       acetaminophen (TYLENOL) 325 MG tablet  alum & mag hydroxide-simethicone " (MAALOX  ES) 400-400-40 MG/5ML SUSP suspension  ARIPiprazole lauroxil ER (ARISTADA) 882 MG/3.2ML intra-muscular  benztropine (COGENTIN) 1 MG tablet  calcium carbonate (TUMS) 500 MG chewable tablet  chlorhexidine (PERIDEX) 0.12 % solution  clobetasol (TEMOVATE) 0.05 % CREA cream  cyclobenzaprine (FLEXERIL) 10 MG tablet  diclofenac (VOLTAREN) 1 % topical gel  docusate sodium (COLACE) 50 MG capsule  fluticasone (FLONASE) 50 MCG/ACT nasal spray  guaiFENesin (ROBITUSSIN) 100 MG/5ML SYRP  hydrocortisone (CORTAID) 0.5 % external cream  hydrOXYzine (ATARAX) 50 MG tablet  hyoscyamine (LEVSIN/SL) 0.125 MG sublingual tablet  ibuprofen (ADVIL/MOTRIN) 400 MG tablet  loperamide (IMODIUM) 2 MG capsule  LORazepam (ATIVAN) 0.5 MG tablet  metoclopramide (REGLAN) 10 MG tablet  multivitamin, therapeutic (THERA-VIT) TABS tablet  omeprazole (PRILOSEC) 40 MG DR capsule  ondansetron (ZOFRAN) 4 MG tablet  polyethylene glycol (MIRALAX) 17 GM/Dose powder  prochlorperazine (COMPAZINE) 10 MG tablet  promethazine (PHENERGAN) 12.5 MG tablet  sennosides (SENOKOT) 8.6 MG tablet  traZODone (DESYREL) 50 MG tablet  venlafaxine (EFFEXOR-ER) 225 MG 24 hr tablet  Vitamin D, Cholecalciferol, 25 MCG (1000 UT) TABS      Allergies   Allergen Reactions     Penicillins Rash and Unknown     Family History  Family History   Problem Relation Age of Onset     Diabetes Type 1 Father      Cancer Paternal Grandfather      Social History   Social History     Tobacco Use     Smoking status: Every Day     Packs/day: 0.50     Years: 5.00     Pack years: 2.50     Types: Vaping Device, Cigarettes     Smokeless tobacco: Never   Substance Use Topics     Alcohol use: No     Drug use: No      Past medical history, past surgical history, medications, allergies, family history, and social history were reviewed with the patient. No additional pertinent items.       Review of Systems   Unable to perform ROS: Psychiatric disorder   Psychiatric/Behavioral: Positive for behavioral  problems, self-injury and suicidal ideas (chronic). Negative for hallucinations and sleep disturbance. The patient is not hyperactive.    All other systems reviewed and are negative.    A complete review of systems was performed with pertinent positives and negatives noted in the HPI, and all other systems negative.    Physical Exam   BP: 137/85  Pulse: 77  Temp: 98.5  F (36.9  C)  Resp: 18  SpO2: 98 %  Physical Exam  Vitals and nursing note reviewed.   Constitutional:       Appearance: She is obese.      Comments: Appears calm.  Typing on her electronic device    HENT:      Head: Normocephalic and atraumatic.      Nose: No congestion or rhinorrhea.   Eyes:      Extraocular Movements: Extraocular movements intact.   Cardiovascular:      Rate and Rhythm: Normal rate.   Pulmonary:      Effort: Pulmonary effort is normal.   Musculoskeletal:         General: Signs of injury (long superfical rub marks to left forearm flexor surface.  approximately 5-10. no skin breakdown. no evidence of infection.  she does not seem bothered by them. ) present.      Cervical back: Normal range of motion.   Skin:     Findings: Lesion present.   Neurological:      General: No focal deficit present.      Mental Status: She is alert and oriented to person, place, and time.   Psychiatric:         Attention and Perception: Attention and perception normal.         Mood and Affect: Mood and affect normal.         Speech: Speech normal.         Behavior: Behavior normal. Behavior is cooperative.         Thought Content: Thought content normal.         Cognition and Memory: Cognition and memory normal.         Judgment: Judgment is impulsive and inappropriate.         ED Course      Procedures       The medical record was reviewed and interpreted.  Mental Health Risk Assessment      PSS-3    Date and Time Over the past 2 weeks have you felt down, depressed, or hopeless? Over the past 2 weeks have you had thoughts of killing yourself? Have you ever  "attempted to kill yourself? When did this last happen? User   12/19/22 1837 yes yes yes within the last 24 hours (including today) MRJ      C-SSRS ([place])    Date and Time Q1 Wished to be Dead (Past Month) Q2 Suicidal Thoughts (Past Month) Q3 Suicidal Thought Method Q4 Suicidal Intent without Specific Plan Q5 Suicide Intent with Specific Plan Q6 Suicide Behavior (Lifetime) Within the Past 3 Months? RETIRED: Level of Risk per Screen Screening Not Complete User   12/19/22 1837 yes yes yes no yes yes -- -- -- MRJ                     No results found for any visits on 12/19/22.  Medications   OLANZapine zydis (zyPREXA) ODT tab 10 mg (10 mg Oral Given 12/19/22 1856)        Assessments & Plan (with Medical Decision Making)   Sadaf Ross is a 22 year old female with a past medical history significant for bipolar 1 disorder, major depressive disorder, PTSD, self-injurious behavior via head banging and cutting who presents to the Emergency Department for mental health evaluation.   She says she is here for the \"same thing\" and then shows her rub marks on her forearm. She is playing on an electronic device and appears in no distress.  She says very little and does not seem interested in talking. She appears at baseline.  She has hx of frequent ed visits with limited frustration tolerance and poor coping skills.  Admission was been deemed detrimental to her ability to cope in the community and enabling her behavior and is not recommended.  She was given zyprexa 10 mg po which seems to help her transport back to her group home.  Group home staff also says she has been at her baseline all day.  They are comfortable with her returning back there tonight via ambulance.     I have reviewed the nursing notes. I have reviewed the findings, diagnosis, plan and need for follow up with the patient.    New Prescriptions    No medications on file       Final diagnoses:   Borderline personality disorder (H)   Intellectual " disability       --  Ashely Toth MD  [facility] EMERGENCY DEPARTMENT  12/19/2022     Ashely Toth MD  12/19/22 1942

## 2022-12-21 ENCOUNTER — NURSE TRIAGE (OUTPATIENT)
Dept: FAMILY MEDICINE | Facility: CLINIC | Age: 22
End: 2022-12-21

## 2022-12-21 ENCOUNTER — HOSPITAL ENCOUNTER (EMERGENCY)
Facility: CLINIC | Age: 22
Discharge: HOME | End: 2022-12-21
Attending: PSYCHIATRY & NEUROLOGY | Admitting: PSYCHIATRY & NEUROLOGY
Payer: MEDICARE

## 2022-12-21 VITALS
TEMPERATURE: 99.1 F | OXYGEN SATURATION: 100 % | BODY MASS INDEX: 40.7 KG/M2 | SYSTOLIC BLOOD PRESSURE: 128 MMHG | HEART RATE: 87 BPM | WEIGHT: 238.4 LBS | HEIGHT: 64 IN | DIASTOLIC BLOOD PRESSURE: 85 MMHG | RESPIRATION RATE: 16 BRPM

## 2022-12-21 DIAGNOSIS — F79 INTELLECTUAL DISABILITY: ICD-10-CM

## 2022-12-21 DIAGNOSIS — Z72.89 SELF-INJURIOUS BEHAVIOR: ICD-10-CM

## 2022-12-21 DIAGNOSIS — F60.3 BORDERLINE PERSONALITY DISORDER (H): ICD-10-CM

## 2022-12-21 PROCEDURE — 250N000013 HC RX MED GY IP 250 OP 250 PS 637: Performed by: PSYCHIATRY & NEUROLOGY

## 2022-12-21 PROCEDURE — 99284 EMERGENCY DEPT VISIT MOD MDM: CPT | Performed by: PSYCHIATRY & NEUROLOGY

## 2022-12-21 PROCEDURE — 99285 EMERGENCY DEPT VISIT HI MDM: CPT | Mod: 25 | Performed by: PSYCHIATRY & NEUROLOGY

## 2022-12-21 PROCEDURE — 90791 PSYCH DIAGNOSTIC EVALUATION: CPT

## 2022-12-21 RX ORDER — OLANZAPINE 10 MG/1
10 TABLET, ORALLY DISINTEGRATING ORAL ONCE
Status: COMPLETED | OUTPATIENT
Start: 2022-12-21 | End: 2022-12-21

## 2022-12-21 RX ADMIN — OLANZAPINE 10 MG: 10 TABLET, ORALLY DISINTEGRATING ORAL at 14:37

## 2022-12-21 ASSESSMENT — COLUMBIA-SUICIDE SEVERITY RATING SCALE - C-SSRS
REASONS FOR IDEATION LIFETIME: EQUALLY TO GET ATTENTION, REVENGE, OR A REACTION FROM OTHERS AND TO END/STOP THE PAIN
REASONS FOR IDEATION PAST MONTH: EQUALLY TO GET ATTENTION, REVENGE, OR A REACTION FROM OTHERS AND TO END/STOP THE PAIN
5. HAVE YOU STARTED TO WORK OUT OR WORKED OUT THE DETAILS OF HOW TO KILL YOURSELF? DO YOU INTEND TO CARRY OUT THIS PLAN?: YES
3. HAVE YOU BEEN THINKING ABOUT HOW YOU MIGHT KILL YOURSELF?: YES
6. HAVE YOU EVER DONE ANYTHING, STARTED TO DO ANYTHING, OR PREPARED TO DO ANYTHING TO END YOUR LIFE?: NO
4. HAVE YOU HAD THESE THOUGHTS AND HAD SOME INTENTION OF ACTING ON THEM?: YES
1. HAVE YOU WISHED YOU WERE DEAD OR WISHED YOU COULD GO TO SLEEP AND NOT WAKE UP?: YES
5. HAVE YOU STARTED TO WORK OUT OR WORKED OUT THE DETAILS OF HOW TO KILL YOURSELF? DO YOU INTEND TO CARRY OUT THIS PLAN?: YES
4. HAVE YOU HAD THESE THOUGHTS AND HAD SOME INTENTION OF ACTING ON THEM?: YES
1. IN THE PAST MONTH, HAVE YOU WISHED YOU WERE DEAD OR WISHED YOU COULD GO TO SLEEP AND NOT WAKE UP?: YES
TOTAL  NUMBER OF ABORTED OR SELF INTERRUPTED ATTEMPTS LIFETIME: NO
2. HAVE YOU ACTUALLY HAD ANY THOUGHTS OF KILLING YOURSELF?: YES
2. HAVE YOU ACTUALLY HAD ANY THOUGHTS OF KILLING YOURSELF?: YES
ATTEMPT LIFETIME: NO
TOTAL  NUMBER OF INTERRUPTED ATTEMPTS LIFETIME: NO

## 2022-12-21 ASSESSMENT — ENCOUNTER SYMPTOMS
CONSTITUTIONAL NEGATIVE: 1
HYPERACTIVE: 0
EYES NEGATIVE: 1
GASTROINTESTINAL NEGATIVE: 1
RESPIRATORY NEGATIVE: 1
DECREASED CONCENTRATION: 1
CARDIOVASCULAR NEGATIVE: 1
MUSCULOSKELETAL NEGATIVE: 1
ENDOCRINE NEGATIVE: 1
NEUROLOGICAL NEGATIVE: 1
HALLUCINATIONS: 0

## 2022-12-21 ASSESSMENT — ACTIVITIES OF DAILY LIVING (ADL)
ADLS_ACUITY_SCORE: 37
ADLS_ACUITY_SCORE: 37
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
## 2022-12-21 NOTE — DISCHARGE INSTRUCTIONS
Follow-up established care and services    Aftercare Plan  If I am feeling unsafe or I am in a crisis, I will:   Contact my established care providers   Call the National Suicide Prevention Lifeline: 988  Go to the nearest emergency room   Call 911      Warning signs that I or other people might notice when a crisis is developing for me: I start become panicky, I start to look around me seeing if people are following me, I start pacing around, and I stop reaching out to my supports.     Things I am able to do on my own to cope or help me feel better: Listen to music, go for walks, rest, deep breaths, and grounding exercises.    Grounding Techniques:  Try to notice where you are, your surroundings including the people, the sounds like the TV or radio.  Concentrate on your breathing. Take a deep cleansing breath from your diaphragm. Count the breaths as you exhale. Make sure you breath slowly.  Hold something that you find comforting, for some it may be a stuffed animal or a blanket. Notice how it feels in your hands. Is it hard or soft?  During a non-crisis time make a list of positive affirmations. Print them out and keep them handy for times of intense anxiety. At those times, read them aloud.  Try the iBid2Save game:  Name 5 things you can see in the room with you  Name 4 things you can feel ( chair on my back  or  feet on floor )   Name 3 things you can hear right now ( people talking  or  tv )   Name 2 things you can smell right now (or, 2 things you like the smell of)   Name 1 good thing about yourself  Create A Safe Place  Image a safe place -- it can be a real or imaginary place:   What do you see -- especially colors?   What sounds do you hear?   What sensations do you feel?   What smells do you smell?   What people or animals would you want in your safe place?   Imagine a protective bubble, wall or boundary around your safe place.   Imagine a door or gate with a guard at your safe place.   Image a lock and key  to your safe place and only you can unlock it.  You can draw or make a collage that represents your safe place.   Choose a souvenir of your safe place -- a color, an object, a song.   Keep your image of your safe place so you can come back to it when you need to.       Reduce Extreme Emotion  QUICKLY:  Changing Your Body Chemistry      T:  Change your body Temperature to change your autonomic nervous system   Use Ice Water to calm yourself down FAST   Put your face in a bowl of ice water (this is the best way; have the person keep his/her face in ice water for 30-45 seconds - initial research is showing that the longer s/he can hold her/his face in the water, the better the response), or   Splash ice water on your face, or hold an ice pack on your face      I:  Intensely exercise to calm down a body revved up by emotion   Examples: running, walking fast, jumping, playing basketball, weight lifting, swimming, calisthenics, etc.   Engage in exercises that DO NOT include violent behaviors. Exercises that utilize violent behaviors tend to function as  behavioral rehearsal,  and rather than calming the person down, may actually  rev  the person up more, increasing the likelihood of violence, and lessening the likelihood that they will  burn off  energy     P:  Progressively relax your muscles   Starting with your hands, moving to your forearms, upper arms, shoulders, neck, forehead, eyes, cheeks and lips, tongue and teeth, chest, upper back, stomach, buttocks, thighs, calves, ankles, feet   Tense (10 seconds,   of the way), then relax each muscle (all the way)   Notice the tension   Notice the difference when relaxed (by tensing first, and then relaxing, you are able to get a more thorough relaxation than by simply relaxing)      P: Paced breathing to relax   The standard technique is to begin with counting the number of steps one takes for a typical inhale, then counting the steps one takes for a typical exhale, and  "then lengthening the amount of steps for the exhalation by one or two steps.  OR  Repeat this pattern for 1-2 minutes  Inhale for four (4) seconds   Exhale for six (6) to eight (8) seconds   Research demonstrated that one can change one's overall level of anxiety by doing this exercise for even a few minutes per day         Things that I am able to do with others to cope or help me better: Listen to music, talk with crisis line, or talk it out with my supports.     Things I can use or do for distraction: Music, walks, and rest.     Changes I can make to support my mental health and wellness: Seek out my supports and talk with them when I need to feel heard and understood.      People in my life that I can ask for help: People I trust such as my grandmas, uncle, staff members at my group home, and Isabel and Shannan at group home.      [facility] has a mental health crisis team you can call 24/7: [facility] Mobile Crisis  [phone]      Other things that are important when I'm in crisis: That people are here to support me.      Additional resources and information:      Crisis Lines  Crisis Text Line  Text 401292  You will be connected with a trained live crisis counselor to provide support.     Por espanol, texto  SIRENA a 488125 o texto a 442-AYUDAME en WhatsApp     The [employer] (LGBTQ Youth Crisis Line)  7.598.693.2972  text START to 363-100        Community Resources  Fast Tracker  Linking people to mental health and substance use disorder resources  fasttrackermn.org      [place] Mental Health Warm Line  Peer to peer support  Monday thru Saturday, 12 pm to 10 pm  641.175.1863 or 9.493.609.9584  Text \"Support\" to 33188     National [employer] on Mental Illness (MAGY)  492.187.6642 or 1.888.MAGY.HELPS        Mental Health Apps  My3  https://my3app.org/     VirtualHopeBox  https://HealthCrowd.org/apps/virtual-hope-box/        Additional Information  Today you were seen by a licensed mental " health professional through Triage and Transition services, [facility] ([facility])  for a crisis assessment in the Emergency Department at [facility].  It is recommended that you follow up with your established providers (psychiatrist, mental health therapist, and/or primary care doctor - as relevant) as soon as possible. Coordinators from [facility] will be calling you in the next 24-48 hours to ensure that you have the resources you need.  You can also contact [facility] coordinators directly at [phone]. You may have been scheduled for or offered an appointment with a mental health provider. [facility] maintains an extensive network of licensed behavioral health providers to connect patients with the services they need.  We do not charge providers a fee to participate in our referral network.  We match patients with providers based on a patient's specific needs, insurance coverage, and location.  Our first effort will be to refer you to a provider within your care system, and will utilize providers outside your care system as needed.

## 2022-12-21 NOTE — TELEPHONE ENCOUNTER
"Patient calling with thoughts of suicide and reports she has a plan. Patient is with her group home staff right now at her home.      RN advised patient to call 911 and go to the ER. She said she will call 911. RN also spoke with group home staff member and asked her to assist the patient with calling 911.     Reason for Disposition    Patient is threatening suicide now    Answer Assessment - Initial Assessment Questions  1. MAIN CONCERN: \"What happened that made you call today?\"      Patient calling with suicidal thoughts.   2. RISK OF HARM - SUICIDAL IDEATION:  \"Do you ever have thoughts of hurting or killing yourself?\"  (e.g., yes, no, no but preoccupation with thoughts about death)    - WISH TO BE DEAD:  \"Have you wished you were dead or wished you could go to sleep and not wake up?\"    - INTENT:  \"Have you had any thoughts of hurting or killing yourself?\" (e.g., yes, no, N/A) If Yes, ask: \"Are you having these thoughts about killing yourself right NOW?\"    - PLAN: \"Have you thought about how you might do this?\" \"Do you have a specific plan for how you would do this?\" (e.g., gun, knife, overdose, no plan, N/A)    - ACCESS: If yes to PLAN, \"Do you have access to patient didn't indicate?\" (e.g., pills, gun in house, knife in kitchen)      Patient reports she has a plan, is at her group home with staff in the room.   3. RISK OF HARM - SUICIDE ATTEMPT: \"Have you tried to harm yourself recently?\" If Yes, ask: When was this?\"        Yes, 12/19 she was in the ED for suicidal ideation/self-harm   4. RISK OF HARM - SUICIDAL BEHAVIOR: \"Have you ever done anything, started to do anything, or prepared to do anything to end your life?\" (e.g., collected pills, bought a gun, wrote a suicide note, cut yourself, started but changed your mind)      Patient has cut herself.   5. EVENTS AND STRESSORS: \"Has there been any new stress or recent changes in your life?\" (e.g., death of loved one, homelessness, negative event, " "relationship breakup, work)      Patient didn't indicate.   6. FUNCTIONAL IMPAIRMENT: \"How have things been going for you overall? Have you had more difficulty than usual doing your normal daily activities?\"  (e.g., better, same, worse; self-care, school, work, interactions)      Patient didn't indicate.   7. SUPPORT: \"Who is with you now?\" \"Who do you live with?\" \"Do you have family or friends who you can talk to?\"       Patient is with her group home staff.   8. THERAPIST: \"Do you have a counselor or therapist? Name?\"      [facility]  9. ALCOHOL USE OR SUBSTANCE USE (DRUG USE): \"Do you drink alcohol or use any illegal drugs?\"      [facility]  10. OTHER: \"Do you have any other physical symptoms right now?\" (e.g., fever)        [facility]  11. PREGNANCY or POSTPARTUM: \"Is there any chance you are pregnant?\" \"When was your last menstrual period?\" \"Were you recently pregnant?\" \"When did you give birth?\"        [facility]    Protocols used: SUICIDE [id number]    Gaby Singh RN  [facility]      "

## 2022-12-21 NOTE — ED PROVIDER NOTES
ED Provider Note  [facility]      History     Chief Complaint   Patient presents with     Suicidal     SI with plan to scratch.  Scratches on left forearm     HPI  Sadaf Ross is a 22 year old female who is here from her group home as she engaged in superficial cutting and had called a [facility] claiming that she was suicidal. Patient was given a dose of Zyprexa on arrival in anticipation of her acting out if she gets sent back. She appears at baseline here.    PERSONAL MEDICAL HISTORY  Past Medical History:   Diagnosis Date     ADHD (attention deficit hyperactivity disorder)      Bipolar 1 disorder (H)      Borderline personality disorder (H)      Depression      Depressive disorder      Intellectual disability      Obesity      Syncope      PAST SURGICAL HISTORY  Past Surgical History:   Procedure Laterality Date     APPENDECTOMY       APPENDECTOMY       FAMILY HISTORY  Family History   Problem Relation Age of Onset     Diabetes Type 1 Father      Cancer Paternal Grandfather      SOCIAL HISTORY  Social History     Tobacco Use     Smoking status: Every Day     Packs/day: 1.00     Years: 5.00     Pack years: 5.00     Types: Vaping Device, Cigarettes     Smokeless tobacco: Never   Substance Use Topics     Alcohol use: No     MEDICATIONS  No current facility-administered medications for this encounter.     Current Outpatient Medications   Medication     acetaminophen (TYLENOL) 325 MG tablet     alum & mag hydroxide-simethicone (MAALOX  ES) 400-400-40 MG/5ML SUSP suspension     ARIPiprazole lauroxil ER (ARISTADA) 882 MG/3.2ML intra-muscular     benztropine (COGENTIN) 1 MG tablet     calcium carbonate (TUMS) 500 MG chewable tablet     clobetasol (TEMOVATE) 0.05 % CREA cream     cyclobenzaprine (FLEXERIL) 10 MG tablet     diclofenac (VOLTAREN) 1 % topical gel     docusate sodium (COLACE) 50 MG capsule     fluticasone (FLONASE) 50 MCG/ACT nasal spray     guaiFENesin (ROBITUSSIN) 100  "MG/5ML SYRP     hydrocortisone (CORTAID) 0.5 % external cream     hydrOXYzine (ATARAX) 50 MG tablet     hyoscyamine (LEVSIN/SL) 0.125 MG sublingual tablet     ibuprofen (ADVIL/MOTRIN) 400 MG tablet     loperamide (IMODIUM) 2 MG capsule     LORazepam (ATIVAN) 0.5 MG tablet     multivitamin, therapeutic (THERA-VIT) TABS tablet     omeprazole (PRILOSEC) 40 MG DR capsule     ondansetron (ZOFRAN) 4 MG tablet     polyethylene glycol (MIRALAX) 17 GM/Dose powder     prochlorperazine (COMPAZINE) 10 MG tablet     promethazine (PHENERGAN) 12.5 MG tablet     sennosides (SENOKOT) 8.6 MG tablet     traZODone (DESYREL) 50 MG tablet     venlafaxine (EFFEXOR-ER) 225 MG 24 hr tablet     Vitamin D, Cholecalciferol, 25 MCG (1000 UT) TABS     ALLERGIES  Allergies   Allergen Reactions     Penicillins Rash and Unknown        Review of Systems   Constitutional: Negative.    HENT: Negative.    Eyes: Negative.    Respiratory: Negative.    Cardiovascular: Negative.    Gastrointestinal: Negative.    Endocrine: Negative.    Genitourinary: Negative.    Musculoskeletal: Negative.    Skin: Negative.    Neurological: Negative.    Psychiatric/Behavioral: Positive for behavioral problems, decreased concentration, self-injury and suicidal ideas. Negative for hallucinations. The patient is not hyperactive.    All other systems reviewed and are negative.        Physical Exam   BP: 128/85  Pulse: 87  Temp: 99.1  F (37.3  C)  Resp: 16  Height: 162.6 cm (5' 4\")  Weight: 108.1 kg (238 lb 6.4 oz)  SpO2: 100 %  Physical Exam  Vitals and nursing note reviewed.   HENT:      Head: Normocephalic.   Eyes:      Pupils: Pupils are equal, round, and reactive to light.   Pulmonary:      Effort: Pulmonary effort is normal.   Musculoskeletal:         General: Normal range of motion.      Cervical back: Normal range of motion.   Neurological:      General: No focal deficit present.      Mental Status: She is alert.   Psychiatric:         Attention and Perception: " Attention and perception normal. She does not perceive auditory or visual hallucinations.         Mood and Affect: Mood and affect normal.         Speech: Speech normal.         Behavior: Behavior normal. Behavior is not agitated, aggressive, hyperactive or combative. Behavior is cooperative.         Thought Content: Thought content normal. Thought content is not paranoid or delusional. Thought content does not include homicidal or suicidal ideation.         Cognition and Memory: Cognition and memory normal.         Judgment: Judgment normal.         ED Course      Procedures       Mental Health Risk Assessment      PSS-3    Date and Time Over the past 2 weeks have you felt down, depressed, or hopeless? Over the past 2 weeks have you had thoughts of killing yourself? Have you ever attempted to kill yourself? When did this last happen? User   12/21/22 1400 yes yes yes within the last month (but not today) JAB      C-SSRS ([place])    Date and Time Q1 Wished to be Dead (Past Month) Q2 Suicidal Thoughts (Past Month) Q3 Suicidal Thought Method Q4 Suicidal Intent without Specific Plan Q5 Suicide Intent with Specific Plan Q6 Suicide Behavior (Lifetime) Within the Past 3 Months? RETIRED: Level of Risk per Screen Screening Not Complete User   12/21/22 1400 yes yes yes no yes yes -- -- -- JAB              Suicide assessment completed by mental health (D.E.C., LCSW, etc.)       No results found for any visits on 12/21/22.  Medications   OLANZapine zydis (zyPREXA) ODT tab 10 mg (10 mg Oral Given 12/21/22 1437)        Assessments & Plan (with Medical Decision Making)   Patient is here for a mental health assessment. She has history of borderline personality disorder and intellectual disability. She is well-known to us from her multiple ED visits. She was just seen 2 days ago. She now feels ready to return to her group home. The Zyprexa appears to have helped. She can be discharged back via ambulance. She is encouraged to  follow-up established care and services.    I have reviewed the nursing notes. I have reviewed the findings, diagnosis, plan and need for follow up with the patient.    New Prescriptions    No medications on file       Final diagnoses:   Borderline personality disorder (H)   Intellectual disability   Self-injurious behavior       --  Magno Sena MD  [facility] EMERGENCY DEPARTMENT  12/21/2022     Magno Sena MD  12/21/22 1925

## 2022-12-22 ENCOUNTER — HOSPITAL ENCOUNTER (EMERGENCY)
Facility: CLINIC | Age: 22
Discharge: HOME | End: 2022-12-22
Attending: PSYCHIATRY & NEUROLOGY | Admitting: PSYCHIATRY & NEUROLOGY
Payer: MEDICARE

## 2022-12-22 VITALS
RESPIRATION RATE: 16 BRPM | OXYGEN SATURATION: 100 % | HEART RATE: 73 BPM | DIASTOLIC BLOOD PRESSURE: 87 MMHG | TEMPERATURE: 97.5 F | SYSTOLIC BLOOD PRESSURE: 122 MMHG

## 2022-12-22 DIAGNOSIS — F60.3 BORDERLINE PERSONALITY DISORDER (H): ICD-10-CM

## 2022-12-22 DIAGNOSIS — F79 INTELLECTUAL DISABILITY: ICD-10-CM

## 2022-12-22 DIAGNOSIS — F31.9 BIPOLAR DISORDER, UNSPECIFIED (H): ICD-10-CM

## 2022-12-22 PROCEDURE — 99285 EMERGENCY DEPT VISIT HI MDM: CPT | Mod: 25

## 2022-12-22 PROCEDURE — 90791 PSYCH DIAGNOSTIC EVALUATION: CPT

## 2022-12-22 PROCEDURE — 250N000013 HC RX MED GY IP 250 OP 250 PS 637: Performed by: PSYCHIATRY & NEUROLOGY

## 2022-12-22 PROCEDURE — 99284 EMERGENCY DEPT VISIT MOD MDM: CPT | Performed by: PSYCHIATRY & NEUROLOGY

## 2022-12-22 RX ORDER — OLANZAPINE 10 MG/1
10 TABLET, ORALLY DISINTEGRATING ORAL ONCE
Status: COMPLETED | OUTPATIENT
Start: 2022-12-22 | End: 2022-12-22

## 2022-12-22 RX ADMIN — OLANZAPINE 10 MG: 10 TABLET, ORALLY DISINTEGRATING ORAL at 17:01

## 2022-12-22 ASSESSMENT — ENCOUNTER SYMPTOMS
EYES NEGATIVE: 1
CARDIOVASCULAR NEGATIVE: 1
CONSTITUTIONAL NEGATIVE: 1
RESPIRATORY NEGATIVE: 1
DECREASED CONCENTRATION: 1
HYPERACTIVE: 0
GASTROINTESTINAL NEGATIVE: 1
NEUROLOGICAL NEGATIVE: 1
HALLUCINATIONS: 0
MUSCULOSKELETAL NEGATIVE: 1

## 2022-12-22 ASSESSMENT — ACTIVITIES OF DAILY LIVING (ADL)
ADLS_ACUITY_SCORE: 37
ADLS_ACUITY_SCORE: 37

## 2022-12-22 ASSESSMENT — COLUMBIA-SUICIDE SEVERITY RATING SCALE - C-SSRS
1. SINCE LAST CONTACT, HAVE YOU WISHED YOU WERE DEAD OR WISHED YOU COULD GO TO SLEEP AND NOT WAKE UP?: YES
5. HAVE YOU STARTED TO WORK OUT OR WORKED OUT THE DETAILS OF HOW TO KILL YOURSELF? DO YOU INTEND TO CARRY OUT THIS PLAN?: NO
TOTAL  NUMBER OF INTERRUPTED ATTEMPTS SINCE LAST CONTACT: NO
6. HAVE YOU EVER DONE ANYTHING, STARTED TO DO ANYTHING, OR PREPARED TO DO ANYTHING TO END YOUR LIFE?: NO
TOTAL  NUMBER OF ABORTED OR SELF INTERRUPTED ATTEMPTS SINCE LAST CONTACT: NO
REASONS FOR IDEATION SINCE LAST CONTACT: COMPLETELY TO GET ATTENTION, REVENGE, OR A REACTION FROM OTHERS
SUICIDE, SINCE LAST CONTACT: NO
2. HAVE YOU ACTUALLY HAD ANY THOUGHTS OF KILLING YOURSELF?: YES
ATTEMPT SINCE LAST CONTACT: NO

## 2022-12-22 NOTE — ED NOTES
EMS arrived. Pt complained of sharp chest pain to them. Writer asked if she ever had her EKG completed, pt stated no. Pt was walked to the room to get an EKG, but pt then refused wanting one. MD notified. MD ok'ed with pt not getting an EKG and still discharging back to .

## 2022-12-22 NOTE — ED TRIAGE NOTES
Patient does not feel they can keep themselves safe. Superficial scratches to left forearm      Triage Assessment     Row Name 12/22/22 5600       Triage Assessment (Adult)    Airway WDL WDL       Respiratory WDL    Respiratory WDL WDL       Skin Circulation/Temperature WDL    Skin Circulation/Temperature WDL WDL       Cardiac WDL    Cardiac WDL WDL       Peripheral/Neurovascular WDL    Peripheral Neurovascular WDL WDL       Cognitive/Neuro/Behavioral WDL    Cognitive/Neuro/Behavioral WDL WDL

## 2022-12-23 ENCOUNTER — NURSE TRIAGE (OUTPATIENT)
Dept: NURSING | Facility: CLINIC | Age: 22
End: 2022-12-23

## 2022-12-23 ENCOUNTER — HOSPITAL ENCOUNTER (EMERGENCY)
Facility: CLINIC | Age: 22
Discharge: NURSING, REHAB OR LONG-TERM CARE | End: 2022-12-23
Attending: FAMILY MEDICINE | Admitting: FAMILY MEDICINE
Payer: MEDICARE

## 2022-12-23 VITALS
OXYGEN SATURATION: 100 % | SYSTOLIC BLOOD PRESSURE: 134 MMHG | DIASTOLIC BLOOD PRESSURE: 82 MMHG | HEART RATE: 74 BPM | RESPIRATION RATE: 16 BRPM | TEMPERATURE: 98.1 F

## 2022-12-23 DIAGNOSIS — F60.3 BORDERLINE PERSONALITY DISORDER (H): Chronic | ICD-10-CM

## 2022-12-23 DIAGNOSIS — F31.32 BIPOLAR AFFECTIVE DISORDER, CURRENTLY DEPRESSED, MODERATE (H): ICD-10-CM

## 2022-12-23 DIAGNOSIS — F79 INTELLECTUAL DISABILITY: Chronic | ICD-10-CM

## 2022-12-23 PROCEDURE — 99285 EMERGENCY DEPT VISIT HI MDM: CPT

## 2022-12-23 PROCEDURE — 250N000013 HC RX MED GY IP 250 OP 250 PS 637: Performed by: EMERGENCY MEDICINE

## 2022-12-23 PROCEDURE — 99285 EMERGENCY DEPT VISIT HI MDM: CPT | Performed by: FAMILY MEDICINE

## 2022-12-23 RX ORDER — OLANZAPINE 10 MG/1
10 TABLET, ORALLY DISINTEGRATING ORAL ONCE
Status: COMPLETED | OUTPATIENT
Start: 2022-12-23 | End: 2022-12-23

## 2022-12-23 RX ORDER — OLANZAPINE 5 MG/1
5 TABLET, ORALLY DISINTEGRATING ORAL AT BEDTIME
Qty: 30 TABLET | Refills: 0 | Status: SHIPPED | OUTPATIENT
Start: 2022-12-23

## 2022-12-23 RX ADMIN — OLANZAPINE 10 MG: 10 TABLET, ORALLY DISINTEGRATING ORAL at 18:22

## 2022-12-23 ASSESSMENT — ACTIVITIES OF DAILY LIVING (ADL): ADLS_ACUITY_SCORE: 37

## 2022-12-23 NOTE — ED PROVIDER NOTES
"ED Provider Note  [facility]      History     Chief Complaint   Patient presents with     Suicidal     Patient feels like they cannot keep themselves safe, superficial cuts to left forearm     HPI  Sadaf Ross is a 22 year old female who is here via cab from her group home as she reports feeling upset and suicidal. She was seen yesterday here and went home after a few hours stay in the ED. She received Zyprexa during her stay here. Patient was given a dose of Zyprexa 10 mg on arrival while she waited to be evaluated. She told the  that she misses her father and engaged in self-injury. They look like the same superficial cuts from yesterday. Patient decided that she wanted to go home after talking to the . She does not care if she goes home by cab or ambulance. She intends to \"sleep off\" her emotional distress.    PERSONAL MEDICAL HISTORY  Past Medical History:   Diagnosis Date     ADHD (attention deficit hyperactivity disorder)      Bipolar 1 disorder (H)      Borderline personality disorder (H)      Depression      Depressive disorder      Intellectual disability      Obesity      Syncope      PAST SURGICAL HISTORY  Past Surgical History:   Procedure Laterality Date     APPENDECTOMY       APPENDECTOMY       FAMILY HISTORY  Family History   Problem Relation Age of Onset     Diabetes Type 1 Father      Cancer Paternal Grandfather      SOCIAL HISTORY  Social History     Tobacco Use     Smoking status: Every Day     Packs/day: 1.00     Years: 5.00     Pack years: 5.00     Types: Vaping Device, Cigarettes     Smokeless tobacco: Never   Substance Use Topics     Alcohol use: No     MEDICATIONS  No current facility-administered medications for this encounter.     Current Outpatient Medications   Medication     acetaminophen (TYLENOL) 325 MG tablet     alum & mag hydroxide-simethicone (MAALOX  ES) 400-400-40 MG/5ML SUSP suspension     ARIPiprazole lauroxil ER (ARISTADA) 882 " MG/3.2ML intra-muscular     benztropine (COGENTIN) 1 MG tablet     calcium carbonate (TUMS) 500 MG chewable tablet     clobetasol (TEMOVATE) 0.05 % CREA cream     cyclobenzaprine (FLEXERIL) 10 MG tablet     diclofenac (VOLTAREN) 1 % topical gel     docusate sodium (COLACE) 50 MG capsule     fluticasone (FLONASE) 50 MCG/ACT nasal spray     guaiFENesin (ROBITUSSIN) 100 MG/5ML SYRP     hydrocortisone (CORTAID) 0.5 % external cream     hydrOXYzine (ATARAX) 50 MG tablet     hyoscyamine (LEVSIN/SL) 0.125 MG sublingual tablet     ibuprofen (ADVIL/MOTRIN) 400 MG tablet     loperamide (IMODIUM) 2 MG capsule     LORazepam (ATIVAN) 0.5 MG tablet     multivitamin, therapeutic (THERA-VIT) TABS tablet     omeprazole (PRILOSEC) 40 MG DR capsule     ondansetron (ZOFRAN) 4 MG tablet     polyethylene glycol (MIRALAX) 17 GM/Dose powder     prochlorperazine (COMPAZINE) 10 MG tablet     promethazine (PHENERGAN) 12.5 MG tablet     sennosides (SENOKOT) 8.6 MG tablet     traZODone (DESYREL) 50 MG tablet     venlafaxine (EFFEXOR-ER) 225 MG 24 hr tablet     Vitamin D, Cholecalciferol, 25 MCG (1000 UT) TABS     ALLERGIES  Allergies   Allergen Reactions     Penicillins Rash and Unknown          Review of Systems   Constitutional: Negative.    HENT: Negative.    Eyes: Negative.    Respiratory: Negative.    Cardiovascular: Negative.    Gastrointestinal: Negative.    Genitourinary: Negative.    Musculoskeletal: Negative.    Skin: Negative.    Neurological: Negative.    Psychiatric/Behavioral: Positive for behavioral problems, decreased concentration, self-injury and suicidal ideas. Negative for hallucinations. The patient is not hyperactive.    All other systems reviewed and are negative.        Physical Exam   BP: 126/83  Pulse: 77  Temp: 97.5  F (36.4  C)  Resp: 16  SpO2: 100 %  Physical Exam  Vitals and nursing note reviewed.   HENT:      Head: Normocephalic.   Eyes:      Pupils: Pupils are equal, round, and reactive to light.   Pulmonary:       Effort: Pulmonary effort is normal.   Musculoskeletal:         General: Normal range of motion.      Cervical back: Normal range of motion.   Neurological:      General: No focal deficit present.      Mental Status: She is alert.   Psychiatric:         Attention and Perception: Attention and perception normal. She does not perceive auditory or visual hallucinations.         Mood and Affect: Mood and affect normal.         Speech: Speech normal.         Behavior: Behavior normal. Behavior is not agitated, aggressive, hyperactive or combative. Behavior is cooperative.         Thought Content: Thought content normal. Thought content is not paranoid or delusional. Thought content does not include homicidal or suicidal ideation.         Cognition and Memory: Cognition and memory normal.         Judgment: Judgment normal.         ED Course      Procedures       Mental Health Risk Assessment      PSS-3    Date and Time Over the past 2 weeks have you felt down, depressed, or hopeless? Over the past 2 weeks have you had thoughts of killing yourself? Have you ever attempted to kill yourself? When did this last happen? User   12/22/22 1630 yes yes yes within the last month (but not today) [facility]      C-SSRS ([place])    Date and Time Q1 Wished to be Dead (Past Month) Q2 Suicidal Thoughts (Past Month) Q3 Suicidal Thought Method Q4 Suicidal Intent without Specific Plan Q5 Suicide Intent with Specific Plan Q6 Suicide Behavior (Lifetime) Within the Past 3 Months? RETIRED: Level of Risk per Screen Screening Not Complete User   12/22/22 1630 yes yes yes no yes yes -- -- -- [facility]              Suicide assessment completed by mental health (D.E.C., LCSW, etc.)       No results found for any visits on 12/22/22.  Medications   OLANZapine zydis (zyPREXA) ODT tab 10 mg (10 mg Oral Given 12/22/22 1701)        Assessments & Plan (with Medical Decision Making)   Patient is here for a mental health assessment. She is well-known to us due to her  frequent ED visits. She was last seen yesterday. She has limited coping skills and tend to seek care and support in the hospital. She appears at baseline and there is limited benefit of a hospitalization. Patient is ready to go home. She forrester snot need to be held here further. She will be sent home via ambulance, and encouraged to follow-up established care and services.    I have reviewed the nursing notes. I have reviewed the findings, diagnosis, plan and need for follow up with the patient.    New Prescriptions    No medications on file       Final diagnoses:   Borderline personality disorder (H)   Intellectual disability       --  Magno Sena MD  [facility] EMERGENCY DEPARTMENT  12/22/2022     Magno Sena MD  12/22/22 3596

## 2022-12-23 NOTE — DISCHARGE INSTRUCTIONS
Follow-up established care and services    Aftercare Plan  If I am feeling unsafe or I am in a crisis, I will:   Contact my established care providers   Call the National Suicide Prevention Lifeline: 988  Go to the nearest emergency room   Call 911     Warning signs that I or other people might notice when a crisis is developing for me: irritable, get mad over small things, feeliing sad, lonely, not using resources    Things I am able to do on my own to cope or help me feel better: use coping strategies, take deep breaths, take a shower, do an art project, ask for PRN medication.     Things that I am able to do with others to cope or help me better: talk with staff, care providers, family, go on an outing, take a walk     Things I can use or do for distraction: listen to music, color, draw, take a shower, talk to a friend     Changes I can make to support my mental health and wellness: take medications as prescribed, count to 50 before making impulsive decisions, get enough sleep, exercise     People in my life that I can ask for help: family, group home staff, therapist, providers     Your [facility] has a mental health crisis team you can call 24/7: [facility]  [phone]     Other things that are important when I'm in crisis: Slow down, take several deep breaths, move to a safe place, give phone to staff to hold until feeling better, ask for help     Additional resources and information: The following DBT skills can assist me when: I want to act on your emotions and acting on them will only make things worse, I am overwhelmed by my emotions, I want to try to be skillful and not act on self destructive behavior.     Reduce Extreme Emotion  QUICKLY:  Changing Your Body Chemistry       T:  Change your body Temperature to change your autonomic nervous system    Use Ice pack to calm yourself down FAST. Place ice pack underneath your eyes for a count of 30 seconds to initiate the divers reflex which  will naturally calm down your heart rate and breathing.      I:  Intensely exercise to calm down a body revved up by emotion    Examples: running, walking fast, jumping, playing basketball, weight lifting, swimming, calisthenics, etc.      Engage in exercises that DO NOT include violent behaviors. Exercises that utilize violent behaviors tend to function as  behavioral rehearsal,  and rather than calming the person down, may actually  rev  the person up more, increasing the likelihood of violence, and lessening the likelihood that they will  burn off  energy     P:  Progressively relax your muscles    Starting with your hands, moving to your forearms, upper arms, shoulders, neck, forehead, eyes, cheeks and lips, tongue and teeth, chest, upper back, stomach, buttocks, thighs, calves, ankles, feet      Tense (10 seconds,   of the way), then relax each muscle (all the way)    Notice the tension    Notice the difference when relaxed (by tensing first, and then relaxing, you are able to get a more thorough relaxation than by simply relaxing)      P: Paced breathing to relax    The standard technique is to begin with counting the number of steps one takes for a typical inhale, then counting the steps one takes for a typical exhale, and then lengthening the amount of steps for the exhalation by one or two steps.  OR repeat this pattern for 1-2 minutes:   Inhale for four (4) seconds    Exhale for six (6) to eight (8) seconds        After using Distress Tolerance TIPP, TRY TO STOP!     S- Stop    Do not just react on your emotion urge. Stop! Freeze! Do not move a muscle! Your emotions may try to make you act without thinking. Stay in control! Take a step back Take a step back from the situation.    T- Take a break    Let go. Take a deep breath. Do not let your feelings make you act impulsively.    O- Observe    Notice what is going on inside and outside you. What is the situation? What are your thoughts and feelings? What  are others saying or doing? Does my emotion make sense, is it justified? What is it that my emotions want me to do? Would that be effective?    P- Proceed mindfully    Act with awareness. In deciding what to do, consider your thoughts and feelings, the situation, and other people s thoughts and feelings. Think about your goals. Ask Wise Mind: Which actions will make it better or worse?        If my emotion action urge would not be effective or helpful, practice acting OPPOSITE to the EMOTION ACTION URGE can help reduce the intensity or even change the emotion.   Consider these examples: with FEAR we have the urge to run away/avoid. OPPOSITE would be to approach it with caution. ANGER we have the urge to attack. OPPOSITE would be to gently avoid or to demonstrate kindness towards it. SADNESS we have the urge to withdraw/isolate. OPPOSITE would be to get self to move and be active physically or socially.      These additional skills may help with self-soothing and distracting you:      Activities   Focus attention on a task you need to get done. Rent movies; watch TV. Clean a room in your house. Find an event to go to. Play computer games. Go walking. Exercise. Surf the Internet. Write e-mails. Play sports. Go out for a meal or eat a favorite food. Call or go out with a friend. Listen to your iPod; download music. Build something. Spend time with your children. Play cards. Read magazines, books, comics. Do crossword puzzles or Sudoku.     Emotions   Read emotional books or stories, old letters. Watch emotional TV shows; go to emotional movies. Listen to emotional music. (Be sure the event creates different emotions.) Ideas: Scary movies, joke books, comedies, funny records, Scientologist music, soothing music or music that fires you up, going to a store and reading funny greeting cards.     Thoughts   Count to 10; count colors in a painting or poster or out the window; count anything. Repeat words to a song in your mind.  Work puzzles. Watch TV or read.     Sensations   Squeeze a rubber ball very hard. Listen to very loud music. Hold ice in your hand or mouth. Go out in the rain or snow. Take a hot or cold shower.   Remember that you can use your 5 senses as helpful self-soothing tools!       I can help my own emotions by practicing the following to keep my emotional mind healthy and bring positive emotions:     The ABC PLEASE skill is about taking good care of ourselves so that we can take care of others. Also, an important component of DBT is to reduce our vulnerability. When we take good care of ourselves, we are less likely to be vulnerable to disease and emotional crisis.     ABC   A- Accumulate positive emotions by doing things that are pleasant.   B- Build mastery by doing things we enjoy. Whether it is reading, cooking, cleaning, fixing a car, working a cross word puzzle, or playing a musical instrument. Practice these things to  and in time we feel competent.   C- [place] Ahead by rehearsing a plan ahead of time so that we can be prepared to cope skillfully. (Think of what makes situations difficult, and what helps in those situations)      PLEASE   Treat Physical Illness and take medications as prescribed.   Balance eating in order to avoid mood swings.   Avoid mood-Altering substances and have mood control.   Maintain good sleep so you can enjoy your life.   Get exercise to maintain high spirits.         Crisis Lines  Crisis Text Line  Text 876512  You will be connected with a trained live crisis counselor to provide support.    Por major, texto  SIRENA a 521167 o texto a 442-AYUDAME en WhatsApp    The [employer]key Project (LGBTQ Youth Crisis Line)  9.500.501.9884  text START to 097-606      Community Resources  Fast Tracker  Linking people to mental health and substance use disorder resources  fastPlastic Logicckermn.org     [place] Mental Health Warm Line  Peer to peer support  Monday thru Saturday, 12 pm to 10 pm   "159.295.2695 or 3.722.991.6770  Text \"Support\" to 02236    National [place] on Mental Illness (MAGY)  303.737.7103 or 1.888.MAGY.HELPS      Mental Health Apps  My3  https://my3app.org/    VirtualHopeBox  https://[employer]/apps/virtual-hope-box/      Additional Information  Today you were seen by a licensed mental health professional through Triage and Transition services, Behavioral Healthcare Providers ([facility])  for a crisis assessment in the Emergency Department at [facility].  It is recommended that you follow up with your established providers (psychiatrist, mental health therapist, and/or primary care doctor - as relevant) as soon as possible. Coordinators from [facility] will be calling you in the next 24-48 hours to ensure that you have the resources you need.  You can also contact [facility] coordinators directly at [phone]. You may have been scheduled for or offered an appointment with a mental health provider. [facility] maintains an extensive network of licensed behavioral health providers to connect patients with the services they need.  We do not charge providers a fee to participate in our referral network.  We match patients with providers based on a patient's specific needs, insurance coverage, and location.  Our first effort will be to refer you to a provider within your care system, and will utilize providers outside your care system as needed.         "

## 2022-12-23 NOTE — TELEPHONE ENCOUNTER
Pt is calling saying that she is having some suicidal ideation.    Pt states she has been cutting her wrists with her own nails - pt states that she is not currently bleeding    Writer had another FNA call 911 while writer kept talking with pt    Pt stated that her father  around this time several years ago and that she gets sad at this time of the year    Pt shared that she does have a counselor     Writer could hear group home being contacted by 911     Writer spoke with group home care giver whom states that pt is safe     Writer explained [facility] protocol and that pt safety is our top priority and that pt should be evaluated for any medication changes that may be needed and to ensure safety     Per Disposition: Call  Now    Writer explained that 911 was called to ensure pt safety per [facility] protocol and also explained that the call is recorded    Care advice given per protocol and when to call back. Pt verbalized understanding and agrees to plan of care.    Diandra Eric RN  [place] Nurse Advisor  12:52 PM 2022             Reason for Disposition    Patient attempted suicide    Protocols used: SUICIDE [id number]

## 2022-12-23 NOTE — CONSULTS
Diagnostic Evaluation Consultation  Crisis Assessment    Patient was assessed: In Person  Patient location: [facility] ED  Was a release of information signed: No. Reason: pt declined      Referral Data and Chief Complaint  Sadaf Ross is a 22 year old, who uses she/her pronouns, and presents to the ED other: cab. Patient is referred to the ED by self. Patient is presenting to the ED for the following concerns: suicidal ideation, reports not able to keep self safe..      Informed Consent and Assessment Methods     Patient is her own guardian. Writer met with patient and explained the crisis assessment process, including applicable information disclosures and limits to confidentiality, assessed understanding of the process, and obtained consent to proceed with the assessment. Patient was observed to be able to participate in the assessment as evidenced by minimally participating in interview. Assessment methods included conducting a formal interview with patient, review of medical records, collaboration with medical staff, and obtaining relevant collateral information from family and community providers when available..     Over the course of this crisis assessment provided reassurance, offered validation, engaged patient in problem solving and disposition planning and worked with patient on safety and aftercare planning. Patient's response to interventions was irritated, minimally responsive     Summary of Patient Situation  Pt reports she came by cab to ED, shows her inner left arm to this writer (superficial scratch marks are present, no bleeding noted), reports scratches were an attempt to end life.  Pt reports she wants to die because she misses her dad.  Pt' father  two years ago.  Pt reports she scratches as she wants to feel the pain.  Attempted to discuss coping skills with pt, pt reports she could have just 'slept it off'.  Is agreeable to returning to the group home.  Attempted to  pt in breathing  and grounding exercise, pt refuses to engage, stating she is irritable and doesn't want to do them.  States 'don't know' when asked about breathing exercises.  Pt gets up and walks away from interview.  Is increasingly irritable, states she is agreeable to returning to , pt mumbled 'i'll just kill myself' as she laid down on the hallway recliner.  This writer addressed comment with pt, pt covered head with blanket and told this writer to leave.  Pt has frequent ED visit for SI and NSSB, continues to contact EMS and emergency services by phone to be brought to ED.  SI and NSSB is present at baseline.  Pt is resistant to engaging in outpt resources to increase resiliency.     Brief Psychosocial History  Pt resides in  with 24/7 staff supports.  Continues to be her own guardian.  Has family support. Has multiple support providers.      Significant Clinical History  Per recent assessment:Pt has a hx of multiple ED visits with the most recent being today 12/21/22, 12/19/22, 12/16/22, 12/15/22, and 12/10/22 as well as multiple previous visits prior. Pt has a hx of inpatient hospitalizations with the last one being on 11/21/2021 at [facility]. Pt has hx of dx of Bipolar 1, Depression, and Borderline Personality Disorder. Pt has a PCP Jess Wilkins MD - [facility], psychiatrist Emma Jacobson NP - [facility], and pt states that she is just about to start trauma therapy with Lynne CASTREJON     Collateral Information  Phone contact with Group Home staff ([phone]).  Reports pt was determined to return to the ED today.  Pt was not suicidal at , was not engaging in NSSB.  Pt has been more irritable, is cycling through ED frequently.     Risk Assessment  [place] Suicide Severity Rating Scale Since Last Contact:   Suicidal Ideation (Since Last Contact)  1. Wish to be Dead (Since Last Contact): Yes  2. Non-Specific Active Suicidal Thoughts (Since Last Contact): Yes  3. Active Suicidal Ideation with any Methods (Not Plan) Without Intent  to Act (Since Last Contact): No  4. Active Suicidal Ideation with Some Intent to Act, Without Specific Plan (Since Last Contact): No  5. Active Suicidal Ideation with Specific Plan and Intent (Since Last Contact): No  Suicidal Behavior (Since Last Contact)  Actual Attempt (Since Last Contact): No  Has subject engaged in non-suicidal self-injurious behavior? (Since Last Contact): Yes  Interrupted Attempts (Since Last Contact): No  Aborted or Self-Interrupted Attempt (Since Last Contact): No  Preparatory Acts or Behavior (Since Last Contact): No  Suicide (Since Last Contact): No     C-SSRS Risk (Since Last Contact)  Calculated C-SSRS Risk Score (Since Last Contact): Low Risk    Validity of evaluation is not impacted by presenting factors during interview  .   Comments regarding subjective versus objective responses to [place] tool: see narrative  Environmental or Psychosocial Events: loss of a loved one and challenging interpersonal relationships  Chronic Risk Factors: serious, persistent mental illness and personality disorders   Warning Signs: talking or writing about death, dying, or suicide  Protective Factors: strong bond to family unit, community support, or employment  Interpretation of Risk Scoring, Risk Mitigation Interventions and Safety Plan:  Pt present with SI and NSSB at baseline.         Does the patient have thoughts of harming others? No     Is the patient engaging in sexually inappropriate behavior?  no        Current Substance Abuse     Is there recent substance abuse? no     Was a urine drug screen or blood alcohol level obtained: No       Mental Status Exam     Affect: Flat   Appearance: Disheveled    Attention Span/Concentration: Inattentive  Eye Contact: Variable   Fund of Knowledge: Delayed    Language /Speech Content: Fluent   Language /Speech Volume: Normal    Language /Speech Rate/Productions: Minimally Responsive    Recent Memory: Intact   Remote Memory: Intact   Mood: Angry and Irritable     Orientation to Person: Yes    Orientation to Place: Yes   Orientation to Time of Day: Yes    Orientation to Date: Yes    Situation (Do they understand why they are here?): Yes    Psychomotor Behavior: Normal    Thought Content: Suicidal   Thought Form: Goal Directed      History of commitment: No      Medication    Psychotropic medications:   No current facility-administered medications for this encounter.     Current Outpatient Medications   Medication     acetaminophen (TYLENOL) 325 MG tablet     alum & mag hydroxide-simethicone (MAALOX  ES) 400-400-40 MG/5ML SUSP suspension     ARIPiprazole lauroxil ER (ARISTADA) 882 MG/3.2ML intra-muscular     benztropine (COGENTIN) 1 MG tablet     calcium carbonate (TUMS) 500 MG chewable tablet     clobetasol (TEMOVATE) 0.05 % CREA cream     cyclobenzaprine (FLEXERIL) 10 MG tablet     diclofenac (VOLTAREN) 1 % topical gel     docusate sodium (COLACE) 50 MG capsule     fluticasone (FLONASE) 50 MCG/ACT nasal spray     guaiFENesin (ROBITUSSIN) 100 MG/5ML SYRP     hydrocortisone (CORTAID) 0.5 % external cream     hydrOXYzine (ATARAX) 50 MG tablet     hyoscyamine (LEVSIN/SL) 0.125 MG sublingual tablet     ibuprofen (ADVIL/MOTRIN) 400 MG tablet     loperamide (IMODIUM) 2 MG capsule     LORazepam (ATIVAN) 0.5 MG tablet     multivitamin, therapeutic (THERA-VIT) TABS tablet     omeprazole (PRILOSEC) 40 MG DR capsule     ondansetron (ZOFRAN) 4 MG tablet     polyethylene glycol (MIRALAX) 17 GM/Dose powder     prochlorperazine (COMPAZINE) 10 MG tablet     promethazine (PHENERGAN) 12.5 MG tablet     sennosides (SENOKOT) 8.6 MG tablet     traZODone (DESYREL) 50 MG tablet     venlafaxine (EFFEXOR-ER) 225 MG 24 hr tablet     Vitamin D, Cholecalciferol, 25 MCG (1000 UT) TABS     Medication changes made in the last two weeks: No       Current Care Team  Primary Care Provider: Jess Wilkins MD - [facility]  Psychiatrist: Emma Jacobson NP - [facility]  Therapist: Rd Barton MA, [facility], and  is starting trauma therapy with Lynne will.  : Yes     Other: Group home.       Diagnosis  1          Borderline personality disorder           F60.3   -primary                         2          Bipolar I disorder, Current or most recent episode depressed, Unspecified F31.9                             3          Unspecified intellectual disability (intellectual developmental disorder)        F79    Clinical Summary and Substantiation of Recommendations    Pt present via cab reporting SI and NSSB.  Denies interest in coping strategies, reports wanting to return to .  Pt's symptoms are consistent with baseline presentation.  Risk is elevated due to to pt's personality disorder, limited intellectual ability.  Pt has appropriate outpt supports with 24/7  supports.  Pt to return to .    Disposition    Recommended disposition: Group Home: d/c to        Reviewed case and recommendations with attending provider. Attending Name: Dr Sean       Attending concurs with disposition: Yes       Patient concurs with disposition: Yes       Guardian concurs with disposition: NA      Final disposition: Group home: d/c to  .       Outpatient Details (if applicable):   Aftercare plan and appointments placed in the AVS and provided to patient: Yes. Given to patient by RN    Was lethal means counseling provided as a part of aftercare planning? No;       Assessment Details    Patient interview started at: 602 and completed at: 622.     Total duration spent on the patient case in minutes: 1.0 hrs      CPT code(s) utilized: 73077 - Psychotherapy for Crisis - 60 (30-74*) min       Michelle Lewis, YOSHI, MSW, LICSW, Psychotherapist  [facility] - Triage & Transition Services  Callback: [phone]

## 2022-12-24 ENCOUNTER — NURSE TRIAGE (OUTPATIENT)
Dept: NURSING | Facility: CLINIC | Age: 22
End: 2022-12-24

## 2022-12-24 NOTE — TELEPHONE ENCOUNTER
"Called [facility] [place] and spoke with their supervisor. She states that Sadaf was participating in their [employer] activities and seemed fine before calling FNA. She stated that Sadaf think she works as a  at [employer] and will often call for an ambulance to \"get to her job.\" Group home staff states that Sadaf frequently does this and then denies being suicidal when police get there.Sadaf will often call FNA and state that she is suicidal and then when PD shows up, she will either slam the door on them or go hide in her bedroom and deny being suicidal. The  stated that they are having a care conference about Sadaf on 1/4 with her care team and are wondering if someone from [facility] leadership would like to attend as Sadaf is frequently calling the nurse line to call 911 for her when the group [place] staff will not. Discussed situation with [facility] staff-[facility] staff is to ask for group [place] staff before calling 911 for Sadaf.    After speaking with [facility], called Officer Juan José to speak with him about PD's safety plan for Sadaf. He states that Sadaf has called 911 over 75 times in the last month and either uses the ambulance for a ride or declines being suicidal when they get there. Officer Juan José states that [facility] dispatch will triage calls that come in about Sadaf. Their current plan is for officers to go out to the house and assess Sadaf before requesting an ambulance to the house.This often upset's Sadaf as she does not want to deal with officers and will trigger more calls with her stating that she is suicidal. Officer Juan José suggested to call back on Monday to discuss the situation with Marcio, the [facility] . Sadaf is well known to them and they have a plan for how to triage whether she needs medical care. Officer Juan José stated obviously he doesn't want us to not follow protocol and not call 911 when warranted and suggested asking to speak to group [place] " staff before calling 911.    Will follow up on Monday with [facility] and group [place] staff for further plans.    Padmini Farrar RN, Triage Supervisor, [facility] December 24, 2022 4:56 PM

## 2022-12-24 NOTE — ED TRIAGE NOTES
Pt feeling suicidal and homicidal, wanting to hurt self and others (people around her, staff from group home and at the ED). . She plans to continue to scratch herself. Pt wants to get zyprexia. Per Ems, pt shared has memories that brought into her mind (death of dad, couple years ago).

## 2022-12-24 NOTE — TELEPHONE ENCOUNTER
"Patient states she is feeling suicidal.    She states she is harming both her arms with her fingernails.    She states 911 was there and they sent a . She states they left 5 minutes ago. She states she does not \"deal\" with police officers. She wants an ambulance.    Patient does not have any weapons near her right now.  Patient states she has a plan to harm herself.    Patient is not alone. Patient lives in a group home and the staff is by her.     She states she prefers [facility].     Per protocol patient advised to call 911. Patient kept on the phone while we wait for 911.    Officer called states this patient uses the EMS for rides. She calls multiple times per month. They have  a plan to determine if she needs the service. Per officer patient does not need services today.    Mercy Stevenson RN on 12/24/2022 at 3:49 PM    Patient notified she needs to work with the group home regarding her needs.      Reason for Disposition    Patient is threatening suicide now    Additional Information    Negative: Patient attempted suicide    Protocols used: SUICIDE [id number]      "

## 2022-12-24 NOTE — ED NOTES
Report given to [facility]. All belongings given to EMS including the new med Olanzepine.  RN Arlene at the group home was called  and is aware that EMS is on their way.  Pt left in stable condition upon discharge

## 2022-12-24 NOTE — DISCHARGE INSTRUCTIONS
Discharge back to [facility] home facility start taking Zyprexa 5 mg at bedtime on a daily basis.

## 2022-12-24 NOTE — ED NOTES
Bed: ED15  Expected date:   Expected time:   Means of arrival:   Comments:  Gaby Aponte 7[age] y/o F SI/HI

## 2022-12-24 NOTE — ED NOTES
Group home RN Arlene () has been made aware that pt is being discharged back to them. Arlene RN informed about the new med that MD has Rx. Discussed with Arlene the new med, writer addressed all the questions Arlene had on the new med. Arlene made aware that the new Rx has been filled and med will be sent with EMS.

## 2022-12-25 ENCOUNTER — NURSE TRIAGE (OUTPATIENT)
Dept: NURSING | Facility: CLINIC | Age: 22
End: 2022-12-25

## 2022-12-25 NOTE — TELEPHONE ENCOUNTER
Patient is feeling psychotic and feels like she wants to hurt herself.  She said she is missing her dad.  She states her dad  2 years ago.  They used to watch wrestling together.  Patient doesn't watch TV anymore.    Patients states she is scratching her left arm.  It is not bleeding.    We discussed her Hugh.  She said she got a book as a present.  They also had a great meal of [place], mashed potatoes and gravy.      Patient room mate got on the phone and stated patient wants to hurt herself and that the ambulance should come.    Patient states she is currently outside having a smoke with her room mates.    Patient states when her dad  many people supported her.    We discussed God and Heaven.  She knows her Dad is loved by God and in heaven.  We discussed watching for signs of him being with her in spirit.  Patient said she saw a cardinal and thought of him.    Patient asked for me to help her and call the ambulance, but with no police.  There was no number for the group home on the notes from previous encounters.  Call 911 and was able to speak to a [place]  and got the contact number to the staff person to call.    [phone] Arlene    Called and spoke to Arlene.  She states patient is fine.  I explained the room mates concern and she says the patient will talk the room mates into believing there is a problem.  Arlene will check in on her now.    Vanda Borjas RN   22 5:10 PM  [facility] Nurse Advisor    Reason for Disposition    Patient is threatening suicide now    Additional Information    Negative: Patient attempted suicide    Protocols used: SUICIDE [id number]

## 2022-12-26 ENCOUNTER — NURSE TRIAGE (OUTPATIENT)
Dept: NURSING | Facility: CLINIC | Age: 22
End: 2022-12-26

## 2022-12-26 NOTE — TELEPHONE ENCOUNTER
"Pt is calling feeling \"psychotic\" for the past 3 days     She states she has a plan to hurt herself by scratching at her arms to draw blood-she has drawn a small amount of blood. She does not have any other plans at this time. She states she is confused and doesn't know where she is. When asked where she was currently she stated she was \"out in the garage\" at her group home. She did not know the exact date but did know it was December. She states she wants to go to the hospital.     Writer asked to speak to group home staff and asked if they have noticed any change in behavior from the past couple of days that would indicate the need to go to the hospital. Staff then asks Sadaf \"do you want to go to the hospital?\" and they try to discuss different things she has been doing that have been good such as staying out of her room. Pt then tells the staff she will hurt them if she doesn't go to the hospital and states she feels like she needs to go for safety purposes    D/t stated safety concerns and reports of wanting to hurt herself and others, 911 was advised per protocol. Pt states she is able to call 911 on her own and denies further needs or concerns at this time         Reason for Disposition    Violent behavior, or threatening to physically hurt or kill someone    Additional Information    Negative: Patient attempted suicide    Negative: Patient is threatening suicide now    Protocols used: SUICIDE [id number]      Stacie Francis RN [facility] December 26, 2022 2:42 PM  "

## 2022-12-29 ENCOUNTER — HOSPITAL ENCOUNTER (EMERGENCY)
Facility: CLINIC | Age: 22
Discharge: NURSING, REHAB OR LONG-TERM CARE | End: 2022-12-29
Attending: EMERGENCY MEDICINE | Admitting: EMERGENCY MEDICINE
Payer: MEDICARE

## 2022-12-29 VITALS
RESPIRATION RATE: 20 BRPM | DIASTOLIC BLOOD PRESSURE: 91 MMHG | HEART RATE: 101 BPM | OXYGEN SATURATION: 100 % | SYSTOLIC BLOOD PRESSURE: 144 MMHG | TEMPERATURE: 98.3 F

## 2022-12-29 DIAGNOSIS — S40.819A ABRASION OF UPPER EXTREMITY, UNSPECIFIED LATERALITY, INITIAL ENCOUNTER: ICD-10-CM

## 2022-12-29 PROCEDURE — 93005 ELECTROCARDIOGRAM TRACING: CPT | Performed by: EMERGENCY MEDICINE

## 2022-12-29 PROCEDURE — 99282 EMERGENCY DEPT VISIT SF MDM: CPT | Performed by: EMERGENCY MEDICINE

## 2022-12-29 PROCEDURE — 99283 EMERGENCY DEPT VISIT LOW MDM: CPT | Performed by: EMERGENCY MEDICINE

## 2022-12-29 ASSESSMENT — ACTIVITIES OF DAILY LIVING (ADL)
ADLS_ACUITY_SCORE: 37
ADLS_ACUITY_SCORE: 37

## 2022-12-30 ENCOUNTER — HOSPITAL ENCOUNTER (EMERGENCY)
Facility: CLINIC | Age: 22
Discharge: HOME | End: 2022-12-30
Attending: PSYCHIATRY & NEUROLOGY | Admitting: PSYCHIATRY & NEUROLOGY
Payer: MEDICARE

## 2022-12-30 VITALS
OXYGEN SATURATION: 100 % | BODY MASS INDEX: 40.68 KG/M2 | SYSTOLIC BLOOD PRESSURE: 121 MMHG | TEMPERATURE: 98.4 F | HEART RATE: 79 BPM | DIASTOLIC BLOOD PRESSURE: 86 MMHG | RESPIRATION RATE: 18 BRPM | WEIGHT: 237 LBS

## 2022-12-30 DIAGNOSIS — F79 INTELLECTUAL DISABILITY: ICD-10-CM

## 2022-12-30 DIAGNOSIS — F60.3 BORDERLINE PERSONALITY DISORDER (H): ICD-10-CM

## 2022-12-30 LAB
ATRIAL RATE - MUSE: 98 BPM
DIASTOLIC BLOOD PRESSURE - MUSE: NORMAL MMHG
INTERPRETATION ECG - MUSE: NORMAL
P AXIS - MUSE: 45 DEGREES
PR INTERVAL - MUSE: 180 MS
QRS DURATION - MUSE: 86 MS
QT - MUSE: 338 MS
QTC - MUSE: 431 MS
R AXIS - MUSE: 24 DEGREES
SYSTOLIC BLOOD PRESSURE - MUSE: NORMAL MMHG
T AXIS - MUSE: 4 DEGREES
VENTRICULAR RATE- MUSE: 98 BPM

## 2022-12-30 PROCEDURE — 250N000013 HC RX MED GY IP 250 OP 250 PS 637: Performed by: PSYCHIATRY & NEUROLOGY

## 2022-12-30 PROCEDURE — 99284 EMERGENCY DEPT VISIT MOD MDM: CPT | Performed by: PSYCHIATRY & NEUROLOGY

## 2022-12-30 PROCEDURE — 99283 EMERGENCY DEPT VISIT LOW MDM: CPT | Performed by: PSYCHIATRY & NEUROLOGY

## 2022-12-30 RX ORDER — OLANZAPINE 10 MG/1
10 TABLET, ORALLY DISINTEGRATING ORAL ONCE
Status: COMPLETED | OUTPATIENT
Start: 2022-12-30 | End: 2022-12-30

## 2022-12-30 RX ADMIN — OLANZAPINE 10 MG: 10 TABLET, ORALLY DISINTEGRATING ORAL at 16:04

## 2022-12-30 ASSESSMENT — ENCOUNTER SYMPTOMS
NEUROLOGICAL NEGATIVE: 1
MUSCULOSKELETAL NEGATIVE: 1
RESPIRATORY NEGATIVE: 1
HALLUCINATIONS: 0
HYPERACTIVE: 0
CARDIOVASCULAR NEGATIVE: 1
GASTROINTESTINAL NEGATIVE: 1
CONSTITUTIONAL NEGATIVE: 1

## 2022-12-30 ASSESSMENT — ACTIVITIES OF DAILY LIVING (ADL): ADLS_ACUITY_SCORE: 37

## 2022-12-30 NOTE — DISCHARGE INSTRUCTIONS
Patient was evaluated for self-injurious behavior including abrasions on bilateral forearm.  No special interventions are required  Patient is cleared from emergent conditions at time of discharge

## 2022-12-30 NOTE — ED TRIAGE NOTES
Triage Assessment     Row Name 12/30/22 1539       Triage Assessment (Adult)    Airway WDL WDL       Respiratory WDL    Respiratory WDL WDL       Skin Circulation/Temperature WDL    Skin Circulation/Temperature WDL X  superficial scratches to forearms       Cardiac WDL    Cardiac WDL WDL       Peripheral/Neurovascular WDL    Peripheral Neurovascular WDL WDL       Cognitive/Neuro/Behavioral WDL    Cognitive/Neuro/Behavioral WDL WDL

## 2022-12-30 NOTE — ED NOTES
Pt in the room wailing, wanting to die and wants to be with her Father (Father has passed away per patient) MD updated  Pt has a 1:1 sitter at [facility]. Sitter and writer unable to redirect pt's behavior

## 2022-12-30 NOTE — ED PROVIDER NOTES
"ED Provider Note  [facility]      History     Chief Complaint   Patient presents with     Suicidal     Feels very suicidal and wants to scratch     Homicidal     \"I want to strangle people.  I want to rip people apart.\"     Hallucinations     Says she hears dead people     HPI  Sadaf Ross is a 22 year old female who is here initially reporting having hallucinations. She had to wait in triage. She then escalated with her threats of having SI and HI that got her brought back to the ED sooner. Patient was then given a dose of Zyprexa 10 mg. She was observed to be calm and actively observing other patient's negative behavior in the ED. Patient is well-known to us due to her frequent ED visits. She was just seen yesterday. She enjoys getting her needs met in the ED. The  saw her and confronted her about her motivations. She now wants to go home.    PERSONAL MEDICAL HISTORY  Past Medical History:   Diagnosis Date     ADHD (attention deficit hyperactivity disorder)      Bipolar 1 disorder (H)      Borderline personality disorder (H)      Depression      Depressive disorder      Intellectual disability      Obesity      Syncope      PAST SURGICAL HISTORY  Past Surgical History:   Procedure Laterality Date     APPENDECTOMY       APPENDECTOMY       FAMILY HISTORY  Family History   Problem Relation Age of Onset     Diabetes Type 1 Father      Cancer Paternal Grandfather      SOCIAL HISTORY  Social History     Tobacco Use     Smoking status: Every Day     Packs/day: 1.00     Years: 5.00     Pack years: 5.00     Types: Vaping Device, Cigarettes     Smokeless tobacco: Never   Substance Use Topics     Alcohol use: No     MEDICATIONS  No current facility-administered medications for this encounter.     Current Outpatient Medications   Medication     acetaminophen (TYLENOL) 325 MG tablet     alum & mag hydroxide-simethicone (MAALOX  ES) 400-400-40 MG/5ML SUSP suspension     ARIPiprazole " lauroxil ER (ARISTADA) 882 MG/3.2ML intra-muscular     benztropine (COGENTIN) 1 MG tablet     calcium carbonate (TUMS) 500 MG chewable tablet     clobetasol (TEMOVATE) 0.05 % CREA cream     cyclobenzaprine (FLEXERIL) 10 MG tablet     diclofenac (VOLTAREN) 1 % topical gel     docusate sodium (COLACE) 50 MG capsule     fluticasone (FLONASE) 50 MCG/ACT nasal spray     guaiFENesin (ROBITUSSIN) 100 MG/5ML SYRP     hydrocortisone (CORTAID) 0.5 % external cream     hydrOXYzine (ATARAX) 50 MG tablet     hyoscyamine (LEVSIN/SL) 0.125 MG sublingual tablet     ibuprofen (ADVIL/MOTRIN) 400 MG tablet     loperamide (IMODIUM) 2 MG capsule     LORazepam (ATIVAN) 0.5 MG tablet     multivitamin, therapeutic (THERA-VIT) TABS tablet     OLANZapine zydis (ZYPREXA) 5 MG ODT     omeprazole (PRILOSEC) 40 MG DR capsule     ondansetron (ZOFRAN) 4 MG tablet     polyethylene glycol (MIRALAX) 17 GM/Dose powder     prochlorperazine (COMPAZINE) 10 MG tablet     promethazine (PHENERGAN) 12.5 MG tablet     sennosides (SENOKOT) 8.6 MG tablet     traZODone (DESYREL) 50 MG tablet     venlafaxine (EFFEXOR-ER) 225 MG 24 hr tablet     Vitamin D, Cholecalciferol, 25 MCG (1000 UT) TABS     ALLERGIES  Allergies   Allergen Reactions     Penicillins Rash and Unknown        Review of Systems   Constitutional: Negative.    HENT: Negative.    Respiratory: Negative.    Cardiovascular: Negative.    Gastrointestinal: Negative.    Genitourinary: Negative.    Musculoskeletal: Negative.    Neurological: Negative.    Psychiatric/Behavioral: Positive for behavioral problems, self-injury and suicidal ideas. Negative for hallucinations. The patient is not hyperactive.    All other systems reviewed and are negative.        Physical Exam   BP: 126/81  Pulse: 96  Temp: 98.6  F (37  C)  Resp: 16  Weight: 107.5 kg (237 lb)  SpO2: 100 %  Physical Exam  Vitals and nursing note reviewed.   HENT:      Head: Normocephalic.   Eyes:      Pupils: Pupils are equal, round, and reactive  to light.   Pulmonary:      Effort: Pulmonary effort is normal.   Musculoskeletal:         General: Normal range of motion.      Cervical back: Normal range of motion.   Neurological:      General: No focal deficit present.      Mental Status: She is alert.   Psychiatric:         Attention and Perception: Attention and perception normal. She does not perceive auditory or visual hallucinations.         Mood and Affect: Mood and affect normal.         Speech: Speech normal.         Behavior: Behavior normal. Behavior is not agitated, aggressive, hyperactive or combative. Behavior is cooperative.         Thought Content: Thought content normal. Thought content is not paranoid or delusional. Thought content does not include homicidal or suicidal ideation.         Cognition and Memory: Cognition and memory normal.         Judgment: Judgment normal.         ED Course      Procedures    Mental Health Risk Assessment      PSS-3    Date and Time Over the past 2 weeks have you felt down, depressed, or hopeless? Over the past 2 weeks have you had thoughts of killing yourself? Have you ever attempted to kill yourself? When did this last happen? User   12/30/22 1539 yes yes yes within the last 24 hours (including today) KARIS      C-SSRS ([place])    Date and Time Q1 Wished to be Dead (Past Month) Q2 Suicidal Thoughts (Past Month) Q3 Suicidal Thought Method Q4 Suicidal Intent without Specific Plan Q5 Suicide Intent with Specific Plan Q6 Suicide Behavior (Lifetime) Within the Past 3 Months? RETIRED: Level of Risk per Screen Screening Not Complete User   12/30/22 1539 yes yes yes no yes yes -- -- --               Suicide assessment completed by mental health (D.E.C., LCSW, etc.)       No results found for any visits on 12/30/22.  Medications   OLANZapine zydis (zyPREXA) ODT tab 10 mg (10 mg Oral Given 12/30/22 8108)        Assessments & Plan (with Medical Decision Making)   Patient is here for a mental health assessment. Patient  is well-known to us due to her multiple ED visits. She appears at baseline and there is no acute safety concern requiring acute intervention. She was declined admission and when confronted by the , she felt ready to return back to her group home. She can be discharged back. She is encouraged to follow-up established care and services.    I have reviewed the nursing notes. I have reviewed the findings, diagnosis, plan and need for follow up with the patient.    Medical Decision Making  The patient presented with a problem that is acute and uncomplicated.    The patient's evaluation involved:  history and exam without other MDM data elements    The patient's management involved only simple and very low risk treatment.        New Prescriptions    No medications on file       Final diagnoses:   Borderline personality disorder (H)   Intellectual disability       --  Magno Sena MD  [facility] EMERGENCY DEPARTMENT  12/30/2022     Magno Sena MD  12/30/22 1415

## 2022-12-30 NOTE — ED NOTES
Group home [facility] updated that EMS is here to transport pt.   All belongings given back to EMS. Pt left in stable condition, calm.

## 2022-12-30 NOTE — ED TRIAGE NOTES
Triage Assessment     Row Name 12/29/22 3264       Triage Assessment (Adult)    Airway WDL WDL       Respiratory WDL    Respiratory WDL WDL       Skin Circulation/Temperature WDL    Skin Circulation/Temperature WDL WDL       Cardiac WDL    Cardiac WDL WDL       Peripheral/Neurovascular WDL    Peripheral Neurovascular WDL WDL       Cognitive/Neuro/Behavioral WDL    Cognitive/Neuro/Behavioral WDL WDL

## 2022-12-30 NOTE — ED PROVIDER NOTES
"    [facility] EMERGENCY DEPARTMENT ([facility])    12/29/22      ED PROVIDER NOTE   ED 16A 6:44 PM   History     Chief Complaint   Patient presents with     Abdominal Pain     Self Harm - Deliberate     The history is provided by the patient and medical records.     Sadaf Ross is a 22 year old female who presents to the Emergency Department reporting suicidal ideation and self-injurious behavior.   She lives in a group home. She scratched her inner forearm superficially, states she wants to scratch her arms more and wants to kill herself. She states she has never done this before, states this is all new (though has been documented in 82 other notes in Epic). She is distressed by grief of losing her father a couple years ago. she wants to hit her head against the wall. She states she has very bad trauma. She doesn't think medications would help. She thinks that she wants to die. She endorses auditory and visual hallucinations, states she hears her father, telling her \"my girl be strong,\" and starts crying stating that she can't be strong right now. She wants to be watched so she doesn't hit her head again. She is taking her medications daily including Prozac, Effexor, trazodone, vitamin B3, colace, Ativan, hydroxyzine. She states she took those medications prior to arrival here. Patient endorses low back pain and chest pain ongoing. She states she hit her elbow against the wall to try to hurt herself.     Past Medical History  Past Medical History:   Diagnosis Date     ADHD (attention deficit hyperactivity disorder)      Bipolar 1 disorder (H)      Borderline personality disorder (H)      Depression      Depressive disorder      Intellectual disability      Obesity      Syncope      Past Surgical History:   Procedure Laterality Date     APPENDECTOMY       APPENDECTOMY       acetaminophen (TYLENOL) 325 MG tablet  alum & mag hydroxide-simethicone (MAALOX  ES) 400-400-40 MG/5ML SUSP suspension  ARIPiprazole " lauroxil ER (ARISTADA) 882 MG/3.2ML intra-muscular  benztropine (COGENTIN) 1 MG tablet  calcium carbonate (TUMS) 500 MG chewable tablet  clobetasol (TEMOVATE) 0.05 % CREA cream  cyclobenzaprine (FLEXERIL) 10 MG tablet  diclofenac (VOLTAREN) 1 % topical gel  docusate sodium (COLACE) 50 MG capsule  fluticasone (FLONASE) 50 MCG/ACT nasal spray  guaiFENesin (ROBITUSSIN) 100 MG/5ML SYRP  hydrocortisone (CORTAID) 0.5 % external cream  hydrOXYzine (ATARAX) 50 MG tablet  hyoscyamine (LEVSIN/SL) 0.125 MG sublingual tablet  ibuprofen (ADVIL/MOTRIN) 400 MG tablet  loperamide (IMODIUM) 2 MG capsule  LORazepam (ATIVAN) 0.5 MG tablet  multivitamin, therapeutic (THERA-VIT) TABS tablet  OLANZapine zydis (ZYPREXA) 5 MG ODT  omeprazole (PRILOSEC) 40 MG DR capsule  ondansetron (ZOFRAN) 4 MG tablet  polyethylene glycol (MIRALAX) 17 GM/Dose powder  prochlorperazine (COMPAZINE) 10 MG tablet  promethazine (PHENERGAN) 12.5 MG tablet  sennosides (SENOKOT) 8.6 MG tablet  traZODone (DESYREL) 50 MG tablet  venlafaxine (EFFEXOR-ER) 225 MG 24 hr tablet  Vitamin D, Cholecalciferol, 25 MCG (1000 UT) TABS      Allergies   Allergen Reactions     Penicillins Rash and Unknown     Family History  Family History   Problem Relation Age of Onset     Diabetes Type 1 Father      Cancer Paternal Grandfather      Social History   Social History     Tobacco Use     Smoking status: Every Day     Packs/day: 1.00     Years: 5.00     Pack years: 5.00     Types: Vaping Device, Cigarettes     Smokeless tobacco: Never   Substance Use Topics     Alcohol use: No     Drug use: No      Past medical history, past surgical history, medications, allergies, family history, and social history were reviewed with the patient. No additional pertinent items.       Review of Systems  A complete review of systems was performed with pertinent positives and negatives noted in the HPI, and all other systems negative.    Physical Exam   BP: (!) 144/91  Pulse: 101  Temp: 98.3  F (36.8   C)  Resp: 20  SpO2: 100 %  Physical Exam  Constitutional:       Comments: Initially screaming, breathing comfortably     Musculoskeletal:      Comments: Superficial linear abrasions on volar portion of bilateral forearms. No tenderness to hands, wrists, or elbows. Moving all 4 extremities easily.         ED Course      Procedures       The medical record was reviewed and interpreted.  Mental Health Risk Assessment      PSS-3    Date and Time Over the past 2 weeks have you felt down, depressed, or hopeless? Over the past 2 weeks have you had thoughts of killing yourself? Have you ever attempted to kill yourself? When did this last happen? User   12/29/22 1806 yes yes yes within the last 24 hours (including today) MM      C-SSRS ([place])    Date and Time Q1 Wished to be Dead (Past Month) Q2 Suicidal Thoughts (Past Month) Q3 Suicidal Thought Method Q4 Suicidal Intent without Specific Plan Q5 Suicide Intent with Specific Plan Q6 Suicide Behavior (Lifetime) Within the Past 3 Months? RETIRED: Level of Risk per Screen Screening Not Complete User   12/29/22 1806 yes yes yes yes no yes -- -- Refuses to answer MM                Item Assessment   Suicidal Ideation None   Plan none   Intent Self injury   Suicidal or self-harm behaviors Scratching herself on her arms with her own fingernails   Risk Factors Recent losses or other significant negative event(s) [legal, financial, relationship, etc]   Protective Factors in group home              Results for orders placed or performed during the hospital encounter of 12/29/22   EKG 12-lead, tracing only     Status: None (Preliminary result)   Result Value Ref Range    Systolic Blood Pressure  mmHg    Diastolic Blood Pressure  mmHg    Ventricular Rate 98 BPM    Atrial Rate 98 BPM    OK Interval 180 ms    QRS Duration 86 ms     ms    QTc 431 ms    P Axis 45 degrees    R AXIS 24 degrees    T Axis 4 degrees    Interpretation ECG       Sinus rhythm  Possible Anterior infarct ,  age undetermined  Abnormal ECG       Medications - No data to display     Assessments & Plan (with Medical Decision Making)     MDM   No acute signs of traumatic injury including lacerations, acute fractures, or infections. Injuries do not appear to need intervention or diagnostic imaging. From a psychiatric perspective patient expresses desire to scratch herself related to her father's passing 2 years ago but does not have overt suicidality. Able to be verbally de-escalated and observed for multiple hours where she has been calm on a one-to-one sitter without intervention. After a period of observation it is determined patient is safe for discharge.     This part of the document was transcribed by Larisa Grant, Medical Scribe.    I have reviewed the nursing notes. I have reviewed the findings, diagnosis, plan and need for follow up with the patient.    Medical Decision Making  The patient presented with a problem that is a stable chronic illness.    The patient's evaluation involved:  review of 3+ prior external note(s) (see separate area of note for details)    The patient's management involved limitations due to social determinants of health.        Discharge Medication List as of 12/29/2022 10:36 PM          Final diagnoses:   Abrasion of upper extremity, unspecified laterality, initial encounter       I, Larisa Grant, am serving as a trained medical scribe to document services personally performed by Saul Milner MD based on the provider's statements to me on December 29, 2022.  This document has been checked and approved by the attending provider.    ISaul MD, was physically present and have reviewed and verified the accuracy of this note documented by Larisa Grant medical scribe.      Saul Milner MD      [facility] EMERGENCY DEPARTMENT  12/29/2022     Saul Milner MD  12/30/22 0023

## 2022-12-30 NOTE — ED NOTES
Dr Milner stated that pt can go back to the group home.  Group home RN Arlene  was called and updated.  [facility] will be setting up the ambulance ride

## 2022-12-30 NOTE — ED TRIAGE NOTES
reports pressure on the brain that is causing her to fell nauseated and SI admits she has been scratching both her forearms today and thought she had to bring herself in. Pt also reports she is shaky and unsteady on her feet and that she barely ate, vomited 9 times yesterday. Thinks she nay have a stomach disease. Onset abd pain on going for a couple days.     Pt also adds onset of chest pain while in [facility] lobby.

## 2022-12-31 NOTE — ED NOTES
Writer contacted group home staff. They confirmed they are available to take pt back. Ambulance contacted.

## 2022-12-31 NOTE — ED NOTES
Staff called patient's group home. They are ready to take patient back. Pt sent to group at this time via EMS.

## 2023-01-01 ENCOUNTER — HOSPITAL ENCOUNTER (EMERGENCY)
Facility: CLINIC | Age: 23
Discharge: HOME | End: 2023-01-02
Attending: EMERGENCY MEDICINE | Admitting: EMERGENCY MEDICINE
Payer: MEDICARE

## 2023-01-01 DIAGNOSIS — F60.3 BORDERLINE PERSONALITY DISORDER (H): ICD-10-CM

## 2023-01-01 DIAGNOSIS — R45.851 SUICIDAL IDEATION: ICD-10-CM

## 2023-01-01 PROCEDURE — 99285 EMERGENCY DEPT VISIT HI MDM: CPT | Mod: 25 | Performed by: EMERGENCY MEDICINE

## 2023-01-01 PROCEDURE — 99282 EMERGENCY DEPT VISIT SF MDM: CPT | Performed by: EMERGENCY MEDICINE

## 2023-01-01 ASSESSMENT — ACTIVITIES OF DAILY LIVING (ADL)
ADLS_ACUITY_SCORE: 37
ADLS_ACUITY_SCORE: 35

## 2023-01-01 NOTE — TELEPHONE ENCOUNTER
"Pt reports \"dizziness, lightheadedness and chest pain all day\". Pt reports her main symptom is dizziness and \"feel like I am going to pass out\".  Symptoms started this morning. \"Warm to touch and overheating\". Pt can be heard walking around and performing activities while speaking to Writer and states \"for the most part yeah\" when asked if feeling ok walking around. Pt reports her temperature at time of call 98.2. Pt gave phone to group home staff for Writer to speak with [facility] group home staff. [facility]ton states pt goes to ER a lot and \"they always send her back and say she is ok\". Pt is able to speak in full sentences, sounds calm except when yelling at staff about \"dizziness\".     Advised [facility] and pt if new or severe symptoms ER visit recommended. If chronic symptoms pt should f/u with PCP. Care Advice reviewed with pt and [facility].     [facility] and pt verbalize understanding and agree to plan.     Reason for Disposition    Dizziness is a chronic symptom (recurrent or ongoing AND present > 4 weeks)    Protocols used: DIZZINESS - [id number]      "
large

## 2023-01-02 ENCOUNTER — NURSE TRIAGE (OUTPATIENT)
Dept: NURSING | Facility: CLINIC | Age: 23
End: 2023-01-02

## 2023-01-02 VITALS
OXYGEN SATURATION: 98 % | RESPIRATION RATE: 18 BRPM | SYSTOLIC BLOOD PRESSURE: 122 MMHG | TEMPERATURE: 98.6 F | HEART RATE: 97 BPM | DIASTOLIC BLOOD PRESSURE: 77 MMHG

## 2023-01-02 VITALS
DIASTOLIC BLOOD PRESSURE: 85 MMHG | BODY MASS INDEX: 40.46 KG/M2 | WEIGHT: 237 LBS | TEMPERATURE: 98.1 F | HEART RATE: 84 BPM | SYSTOLIC BLOOD PRESSURE: 122 MMHG | OXYGEN SATURATION: 100 % | HEIGHT: 64 IN | RESPIRATION RATE: 14 BRPM

## 2023-01-02 DIAGNOSIS — F60.3 BORDERLINE PERSONALITY DISORDER (H): ICD-10-CM

## 2023-01-02 DIAGNOSIS — F79 INTELLECTUAL DISABILITY: ICD-10-CM

## 2023-01-02 PROCEDURE — 96372 THER/PROPH/DIAG INJ SC/IM: CPT | Performed by: EMERGENCY MEDICINE

## 2023-01-02 PROCEDURE — 99284 EMERGENCY DEPT VISIT MOD MDM: CPT | Performed by: EMERGENCY MEDICINE

## 2023-01-02 PROCEDURE — 90791 PSYCH DIAGNOSTIC EVALUATION: CPT

## 2023-01-02 PROCEDURE — 250N000011 HC RX IP 250 OP 636: Performed by: EMERGENCY MEDICINE

## 2023-01-02 RX ORDER — OLANZAPINE 10 MG/2ML
10 INJECTION, POWDER, FOR SOLUTION INTRAMUSCULAR ONCE
Status: COMPLETED | OUTPATIENT
Start: 2023-01-02 | End: 2023-01-02

## 2023-01-02 RX ORDER — OLANZAPINE 5 MG/1
5 TABLET, ORALLY DISINTEGRATING ORAL ONCE
Status: DISCONTINUED | OUTPATIENT
Start: 2023-01-02 | End: 2023-01-02

## 2023-01-02 RX ADMIN — OLANZAPINE 10 MG: 10 INJECTION, POWDER, FOR SOLUTION INTRAMUSCULAR at 20:05

## 2023-01-02 ASSESSMENT — COLUMBIA-SUICIDE SEVERITY RATING SCALE - C-SSRS
1. IN THE PAST MONTH, HAVE YOU WISHED YOU WERE DEAD OR WISHED YOU COULD GO TO SLEEP AND NOT WAKE UP?: YES
5. HAVE YOU STARTED TO WORK OUT OR WORKED OUT THE DETAILS OF HOW TO KILL YOURSELF? DO YOU INTEND TO CARRY OUT THIS PLAN?: NO
2. HAVE YOU ACTUALLY HAD ANY THOUGHTS OF KILLING YOURSELF?: YES
REASONS FOR IDEATION LIFETIME: MOSTLY TO GET ATTENTION, REVENGE, OR A REACTION FROM OTHERS
4. HAVE YOU HAD THESE THOUGHTS AND HAD SOME INTENTION OF ACTING ON THEM?: NO
4. HAVE YOU HAD THESE THOUGHTS AND HAD SOME INTENTION OF ACTING ON THEM?: NO
5. HAVE YOU STARTED TO WORK OUT OR WORKED OUT THE DETAILS OF HOW TO KILL YOURSELF? DO YOU INTEND TO CARRY OUT THIS PLAN?: NO
1. HAVE YOU WISHED YOU WERE DEAD OR WISHED YOU COULD GO TO SLEEP AND NOT WAKE UP?: YES
ATTEMPT LIFETIME: NO
2. HAVE YOU ACTUALLY HAD ANY THOUGHTS OF KILLING YOURSELF?: YES
3. HAVE YOU BEEN THINKING ABOUT HOW YOU MIGHT KILL YOURSELF?: NO

## 2023-01-02 ASSESSMENT — ACTIVITIES OF DAILY LIVING (ADL)
ADLS_ACUITY_SCORE: 37

## 2023-01-02 NOTE — ED NOTES
Spoke with  staff Arlene BLACK- Staff updated on patients status and that no changes have been made. Patient is ready to go back to  and  staff are ready to take patient home. Will be calling for transport.

## 2023-01-02 NOTE — ED NOTES
Bed: [facility]  Expected date:   Expected time:   Means of arrival:   Comments:  Gaby [place] 711 23 y/o F abd. pain/anxiety  To triage

## 2023-01-02 NOTE — TELEPHONE ENCOUNTER
"Patient returned from shopping.  Feels shaky due to eating a lot.  She states she feels violent and wants to kill herself.  Patient doesn't want me to call 911 says \" they wont come\" says she has a number for the ambulance and she would like to call them.  I asked if I can call them and she said no.  I asked if I can stay on the phone with her while she calls and she says no then she wont be able to call.    I told patient I will call her back.    I called the Group home .  They just took patient shopping.  She was happy and agreed to take her medication.  As I was speaking with Arlene the group home was calling and Arlene said they will take care of it.  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    Vanda Borjas RN   01/02/23 5:31 PM  [facility] Nurse Advisor    Reason for Disposition    Patient is threatening suicide now    Additional Information    Negative: Patient attempted suicide    Protocols used: SUICIDE [id number]      "

## 2023-01-02 NOTE — ED NOTES
"Collateral from Group Home Nurse:  [phone]    Nurse reported that pt was find today, that she took meds, ate etc.  Pt called the crisis team and knows what to tell them to have them call 911.  911 was called by crisis and then she was brought to the ED by ambulance. Nurse states that pt just wanted to come and pt will tell the nurse that she has to go to her \"job\", saying that she is a  at [place] and needs to make beds.      They are having a team meeting on Jan 4th about this issue and mobile crisis will need to be included in the at plan because Pt will use mobile crisis to get them to call 911.      Pt can return to group [place].      "

## 2023-01-02 NOTE — CONSULTS
Diagnostic Evaluation Consultation  Crisis Assessment    Patient was assessed: In Person  Patient location: [facility] ED  Was a release of information signed: No. Reason: patient declined      Referral Data and Chief Complaint  Patient is a 22 year old, who uses she/her pronouns, and presents to the ED via EMS. Patient is referred to the ED by self. Patient is presenting to the ED for the following concerns: Nausea and SI.  Patient stated she had SI with a plan to scratch herself.  She denied NSSI and HI.      Informed Consent and Assessment Methods     Patient is her own guardian. Writer met with patient and explained the crisis assessment process, including applicable information disclosures and limits to confidentiality, assessed understanding of the process, and obtained consent to proceed with the assessment. Patient was observed to be able to participate in the assessment as evidenced by waking up to talk. Assessment methods included conducting a formal interview with patient, review of medical records, collaboration with medical staff, and obtaining relevant collateral information from family and community providers when available..     Over the course of this crisis assessment provided reassurance, offered validation and engaged patient in problem solving and disposition planning. Patient's response to interventions was receptive.     Summary of Patient Situation     The following was stated in patient's care plan section:    The pt has become increasingly disruptive in the Emergency Department.  The pt will yell, demand, and scream and disrupt the milieu, and this happens before she finds out the recommendation to send her back to her group home.  Today, the pt postured toward the ED doctor and threatened to  punch him.   During one of her ED visits over the past week, the pt befriended another patient who is waiting for an inpatient bed.  The pt has been texting and contacting this  "patient.  There is concern that one of the reasons the pt is coming to the ED is to interact with this patient.        An Emergency Department Care Plan is being sought in order to reduce and extinguish the pt s numerous ED visits.  The pt does have an active outpatient team that the pt can utilize for support and lives in a group home with 24/7 staffing.  Typically, the pt calls 911, not group home staff.  Additionally, [facility] have attempted to recommend additionally services, such as day treatment, and the pt refuses the recommendations.  It appears that the pt comes to the ED, requesting admission as she does not like her group home.        To that end, it is recommended that if the pt returns to the Emergency Department, she be triaged and if there is not any acute new symptoms, both medically and mental health-wise, the group home be called and informed that the pt is returning and medical transport be set up for her return.  If the pt becomes dysregulated, the pt should be offered/given appropriate medications.  This should not hinder her return to her group home.        9/27/22, Nuria Deleon [facility]; Dr. Luis De Anda[facility]    The ED MD asked for an assessment tonight because the patient would not answer her questions and cussed at her.  The patient was pleasant and her responses were minimal to this .  Patient stated she did not remember why she came to the ED.  She was otherwise alert and oriented.  She said, \"I have SI with a plan to scratch myself.  I want to go back home, though.\"  She denied any suicide attempts.  She stated she had marks on her, but she did not state from what or when it happened.  She denied symptoms of psychosis or monae.  She feels like she's been sleeping too much.  She was not sure how to sleep less.  Her eating has been adequate.  She was observed to be talking and joking around with the ED staff, the rest of the visit.    Brief Psychosocial History     Pt " "resides in  with 24/7 staff supports.  Continues to be her own guardian.  Has family support. Has multiple support providers    Significant Clinical History     Per recent assessment:Pt has a hx of multiple ED visits with the most recent being today 12/21/22, 12/19/22, 12/16/22, 12/15/22, and 12/10/22 as well as multiple previous visits prior. Pt has a hx of inpatient hospitalizations with the last one being on 11/21/2021 at [facility]. Pt has hx of dx of Bipolar 1, Depression, and Borderline Personality Disorder. Pt has a PCP Jess Wilkins MD - [facility], psychiatrist Emma Jacobson NP - [facility], and pt states that she is just about to start trauma therapy with Lynne CASTREJON     Collateral Information    Collateral from Group Home Nurse:  [phone]     Nurse reported that pt was find today, that she took meds, ate etc.  Pt called the crisis team and knows what to tell them to have them call 911.  911 was called by crisis and then she was brought to the ED by ambulance. Nurse states that pt just wanted to come and pt will tell the nurse that she has to go to her \"job\", saying that she is a  at [place] and needs to make beds.       They are having a team meeting on Jan 4th about this issue and mobile crisis will need to be included in the at plan because Pt will use mobile crisis to get them to call 911.       Pt can return to group [place].    Collateral obtained by YASMINE Oro     Risk Assessment    [place] Suicide Severity Rating Scale Full Clinical Version:1/01/2023  Suicidal Ideation  1. Wish to be Dead (Lifetime): Yes  1. Wish to be Dead (Past 1 Month): Yes  2. Non-Specific Active Suicidal Thoughts (Lifetime): Yes  2. Non-Specific Active Suicidal Thoughts (Past 1 Month): Yes  Non-Specific Active Suicidal Thought Description (Past 1 Month): chronic ongoing suicidal statements  3. Active Suicidal Ideation with any Methods (Not Plan) Without Intent to Act (Lifetime): No  3. Active Suicidal Ideation with " "any Methods (Not Plan) Without Intent to Act (Past 1 Month): Yes  Active Suicidal Ideation with any Methods (Not Plan) Description (Past 1 Month): \"scratch myself\"  4. Active Suicidal Ideation with Some Intent to Act, Without Specific Plan (Lifetime): No  4. Active Suicidal Ideation with Some Intent to Act, Without Specific Plan (Past 1 Month): No  5. Active Suicidal Ideation with Specific Plan and Intent (Lifetime): No  5. Active Suicidal Ideation with Specific Plan and Intent (Past 1 Month): No  Intensity of Ideation  Most Severe Ideation Rating (Lifetime): 5  Deterrents (Lifetime): Uncertain that deterrents stopped you  Reasons for Ideation (Lifetime): Mostly to get attention, revenge, or a reaction from others  Suicidal Behavior  Actual Attempt (Lifetime): No  C-SSRS Risk (Lifetime/Recent)  Calculated C-SSRS Risk Score (Lifetime/Recent): Moderate Risk    [place] Suicide Severity Rating Scale Since Last Contact: 1/01/2023       Validity of evaluation is impacted by presenting factors during interview chronic statements, not acute.   Comments regarding subjective versus objective responses to [place] tool:   Environmental or Psychosocial Events: loss of a loved one and impulsivity/recklessness  Chronic Risk Factors: serious, persistent mental illness and personality disorders   Warning Signs: talking or writing about death, dying, or suicide  Protective Factors: lives in a responsibly safe and stable environment, able to access care without barriers and help seeking  Interpretation of Risk Scoring, Risk Mitigation Interventions and Safety Plan:  Moderate Risk with decreasing SI.       Does the patient have thoughts of harming others? No     Is the patient engaging in sexually inappropriate behavior?  no        Current Substance Abuse     Is there recent substance abuse? no     Was a urine drug screen or blood alcohol level obtained: No       Mental Status Exam     Affect: Appropriate   Appearance: Appropriate  "   Attention Span/Concentration: Attentive  Eye Contact: Engaged   Fund of Knowledge: Delayed    Language /Speech Content: Fluent   Language /Speech Volume: Normal    Language /Speech Rate/Productions: Minimally Responsive    Recent Memory: Variable   Remote Memory: Variable   Mood: Normal    Orientation to Person: Yes    Orientation to Place: Yes   Orientation to Time of Day: Yes    Orientation to Date: Yes    Situation (Do they understand why they are here?): Yes    Psychomotor Behavior: Normal    Thought Content: Clear and Suicidal   Thought Form: Intact      History of commitment: No      Medication    Psychotropic medications: Yes    acetaminophen (TYLENOL) 325 MG tablet     alum & mag hydroxide-simethicone (MAALOX  ES) 400-400-40 MG/5ML SUSP suspension     ARIPiprazole lauroxil ER (ARISTADA) 882 MG/3.2ML intra-muscular     benztropine (COGENTIN) 1 MG tablet     calcium carbonate (TUMS) 500 MG chewable tablet     clobetasol (TEMOVATE) 0.05 % CREA cream     cyclobenzaprine (FLEXERIL) 10 MG tablet     diclofenac (VOLTAREN) 1 % topical gel     docusate sodium (COLACE) 50 MG capsule     fluticasone (FLONASE) 50 MCG/ACT nasal spray     guaiFENesin (ROBITUSSIN) 100 MG/5ML SYRP     hydrocortisone (CORTAID) 0.5 % external cream     hydrOXYzine (ATARAX) 50 MG tablet     hyoscyamine (LEVSIN/SL) 0.125 MG sublingual tablet     ibuprofen (ADVIL/MOTRIN) 400 MG tablet     loperamide (IMODIUM) 2 MG capsule     LORazepam (ATIVAN) 0.5 MG tablet     multivitamin, therapeutic (THERA-VIT) TABS tablet     omeprazole (PRILOSEC) 40 MG DR capsule     ondansetron (ZOFRAN) 4 MG tablet     polyethylene glycol (MIRALAX) 17 GM/Dose powder     prochlorperazine (COMPAZINE) 10 MG tablet     promethazine (PHENERGAN) 12.5 MG tablet     sennosides (SENOKOT) 8.6 MG tablet     traZODone (DESYREL) 50 MG tablet     venlafaxine (EFFEXOR-ER) 225 MG 24 hr tablet     Vitamin D, Cholecalciferol, 25 MCG (1000 UT) TABS      Medication changes made in the  last two weeks: No       Current Care Team    Primary Care Provider: Jess Wilkins MD - [facility]  Psychiatrist: Emma Jacobson NP - [facility]  Therapist: Rd Barton MA, [facility] - [facility], and is starting trauma therapy with Lynne LUCÍA will.  : Yes     Other: Group home.      Diagnosis    1          Borderline personality disorder           F60.3   -primary                         2          Bipolar I disorder, Current or most recent episode depressed, Unspecified F31.9                             3          Unspecified intellectual disability (intellectual developmental disorder)        F79    Clinical Summary and Substantiation of Recommendations    After therapeutic assessment, intervention and aftercare planning by ED care team and LM and in consultation with attending provider, the patient's circumstances and mental state were appropriate for outpatient management. It is the recommendation of this clinician that pt discharge with OP MH support. A this time the pt is not presenting as an acute risk to self or others due to the following factors: Patient has chronic SI statements.  She denied suicide attempts.  She denied NSSI and HI.  She was calm and cooperative.  She was not aggressive.  She did not have psychosis symptoms.  She will return to the group home and she will follow up with outpatient providers..    Disposition    Recommended disposition: Group Home, outpatient providers       Reviewed case and recommendations with attending provider. Attending Name: Deepa Pelaez MD       Attending concurs with disposition: Yes       Patient concurs with disposition: Yes       Guardian concurs with disposition: Yes      Final disposition: Group Home, outpatient providers already established.     IOutpatient Details (if applicable):   Aftercare plan and appointments placed in the AVS and provided to patient: Yes. Given to patient by RN    Was lethal means counseling provided as a part of aftercare  planning? No;       Assessment Details    Patient interview started at: 2345 and completed at: 2400.     Total duration spent on the patient case in minutes: 1.0 hrs      CPT code(s) utilized: 90266 - Psychotherapy for Crisis - 60 (30-74*) min       Chloe Goins, LICSW, MSW, LICSW, Psychotherapist  DEC - Triage & Transition Services  Callback: [phone]      Aftercare Plan    If I am feeling unsafe or I am in a crisis, I will:  Contact my established care providers  Call the [place] Suicide Prevention Lifeline: 988  Go to the nearest emergency room  Call 911  Warning signs that I or other people might notice when a crisis is  developing for me: irritable, get mad over small things, feeliing sad,  lonely, not using resources  Things I am able to do on my own to cope or help me feel better:  use coping strategies, take deep breaths, take a shower, do an art  project, ask for PRN medication.  Things that I am able to do with others to cope or help me better:  talk with staff, care providers, family, go on an outing, take a walk  Things I can use or do for distraction: listen to music, color, draw, take  a shower, talk to a friend  Changes I can make to support my mental health and wellness: take  medications as prescribed, count to 50 before making impulsive  decisions, get enough sleep, exercise  People in my life that I can ask for help: family, group home staff,  therapist, providers  Your [facility] has a mental health crisis team you can call 24/7:  [facility] Mobile Crisis  [phone]  Other things that are important when I'm in crisis: Slow down, take  several deep breaths, move to a safe place, give phone to staff to hold  until feeling better, ask for help  Additional resources and information: The following DBT skills can  assist me when: I want to act on your emotions and acting on them  will only make things worse, I am overwhelmed by my emotions, I  want to try to be skillful and not  act on self destructive behavior.  Reduce Extreme Emotion QUICKLY: Changing Your Body Chemistry  T: Change your body Temperature to change your autonomic nervous system  Use Ice pack to calm yourself down FAST. Place ice pack underneath your eyes for a count of 30 seconds to initiate the  divers reflex which will naturally calm down your heart rate and breathing.  I: Intensely exercise to calm down a body revved up by emotion  Examples: running, walking fast, jumping, playing basketball, weight lifting, swimming, calisthenics, etc.  Engage in exercises that DO NOT include violent behaviors. Exercises that utilize violent behaviors tend to function as   behavioral rehearsal,  and rather than calming the person down, may actually  rev  the person up more, increasing the  likelihood of violence, and lessening the likelihood that they will  burn off  energy  P: Progressively relax your muscles  Starting with your hands, moving to your forearms, upper arms, shoulders, neck, forehead, eyes, cheeks and lips,  tongue and teeth, chest, upper back, stomach, buttocks, thighs, calves, ankles, feet  Tense (10 seconds, Â  of the way), then relax each muscle (all the way)  Notice the tension  Notice the difference when relaxed (by tensing first, and then relaxing, you are able to get a more thorough  relaxation than by simply relaxing)  P: Paced breathing to relax  The standard technique is to begin with counting the number of steps one takes for a typical inhale, then counting the  steps one takes for a typical exhale, and then lengthening the amount of steps for the exhalation by one or two steps.  OR repeat this pattern for 1-2 minutes:  Inhale for four (4) seconds  Exhale for six (6) to eight (8) seconds  After using Distress Tolerance TIPP, TRY TO STOP!  S- Stop  Do not just react on your emotion urge. Stop! Freeze! Do not move a muscle! Your emotions may try to make you act  without thinking. Stay in control! Take a step  back Take a step back from the situation.  T- Take a break  Let go. Take a deep breath. Do not let your feelings make you act impulsively.  O- Observe  Notice what is going on inside and outside you. What is the situation? What are your thoughts and feelings? What are  others saying or doing? Does my emotion make sense, is it justified? What is it that my emotions want me to do?  Would that be effective?  P- Proceed mindfully  Act with awareness. In deciding what to do, consider your thoughts and feelings, the situation, and other people s  thoughts and feelings. Think about your goals. Ask Wise Mind: Which actions will make it better or worse?  If my emotion action urge would not be effective or helpful, practice acting OPPOSITE to the EMOTION ACTION  URGE can help reduce the intensity or even change the emotion.  Consider these examples: with FEAR we have the urge to run away/avoid. OPPOSITE would be to approach it with  caution. ANGER we have the urge to attack. OPPOSITE would be to gently avoid or to demonstrate kindness towards it.  SADNESS we have the urge to withdraw/isolate. OPPOSITE would be to get self to move and be active physically or  socially.  These additional skills may help with self-soothing and distracting you:  Activities  Focus attention on a task you need to get done. Rent movies; watch TV. Clean a room in your house. Find an event to  go to. Play computer games. Go walking. Exercise. Surf the Internet. Write e-mails. Play sports. Go out for a meal or eat  a favorite food. Call or go out with a friend. Listen to your iPod; download music. Build something. Spend time with your  children. Play cards. Read magazines, books, comics. Do crossword puzzles or Sudoku.  Emotions  Read emotional books or stories, old letters. Watch emotional TV shows; go to emotional movies. Listen to emotional  music. (Be sure the event creates different emotions.) Ideas: Scary movies, joke books, comedies, funny  records,  Tenriism music, soothing music or music that fires you up, going to a store and reading funny greeting cards.  Thoughts  Count to 10; count colors in a painting or poster or out the window; count anything. Repeat words to a song in your  mind. Work puzzles. Watch TV or read.  Sensations  Squeeze a rubber ball very hard. Listen to very loud music. Hold ice in your hand or mouth. Go out in the rain or snow.  Take a hot or cold shower.  Remember that you can use your 5 senses as helpful self-soothing tools!  I can help my own emotions by practicing the following to keep my emotional mind healthy and bring positive  emotions:  The ABC PLEASE skill is about taking good care of ourselves so that we can take care of others. Also, an important  component of DBT is to reduce our vulnerability. When we take good care of ourselves, we are less likely to be  vulnerable to disease and emotional crisis.  ABC  A- Accumulate positive emotions by doing things that are pleasant.  B- Build mastery by doing things we enjoy. Whether it is reading, cooking, cleaning, fixing a car, working a cross word  puzzle, or playing a musical instrument. Practice these things to  and in time we feel competent.  C- [place] by rehearsing a plan ahead of time so that we can be prepared to cope skillfully. (Think of what makes  situations difficult, and what helps in those situations)  PLEASE  Treat Physical Illness and take medications as prescribed.  Balance eating in order to avoid mood swings.  Avoid mood-Altering substances and have mood control.  Maintain good sleep so you can enjoy your life.  Get exercise to maintain high spirits.  Crisis Lines  Crisis Text Line  Text 289290  You will be connected with a trained live crisis counselor to provide support.  Por espanol, texto SIRENA a 958904 o texto a 442-AYUDAME en WhatsApp  The Mikey Project (LGBTQ Youth Crisis Line)  9.258.577.2135  text START to 346-908  [facility]  "Resources  Fast Tracker  Linking people to mental health and substance use disorder resources  fasttrackermn.org   [place] Mental Health Warm Line  Peer to peer support  Monday thru Saturday, 12 pm to 10 pm  269.144.5118 or  2.294.638.0408  Text \"Support\" to 64339  National Waves on Mental Illness (MAGY)  701.639.9945 or 1.888.MAGY.HELPS  Mental Health Apps  My3  https://Espinela.org/  VirtualHopeBox  https://Gangkr/apps/virtual-hope-box/  Additional Information  Today you were seen by a licensed mental health professional through Triage and Transition services, Behavioral  Healthcare Providers ([facility]) for a crisis assessment in the Emergency Department at [facility]. It is  recommended that you follow up with your established providers (psychiatrist, mental health therapist, and/or primary  care doctor - as relevant) as soon as possible. Coordinators from [facility] will be calling you in the next 24-48 hours to  ensure that you have the resources you need. You can also contact [facility] coordinators directly at [phone]. You may  have been scheduled for or offered an appointment with a mental health provider. [facility] maintains an extensive network  of licensed behavioral health providers to connect patients with the services they need. We do not charge providers a  fee to participate in our referral network. We match patients with providers based on a patient's specific needs,  insurance coverage, and location. Our first effort will be to refer you to a provider within your care system, and will  utilize providers outside your care system as needed.      "

## 2023-01-02 NOTE — DISCHARGE INSTRUCTIONS
You have been seen in the emergency department today for mental health evaluation.  Your symptoms are baseline and chronic.  We advise that you continue taking your regular medications, work with your group home staff, and work with your outpatient psychiatrist and therapist.    Aftercare Plan    If I am feeling unsafe or I am in a crisis, I will:  Contact my established care providers  Call the National Suicide Prevention Lifeline: 988  Go to the nearest emergency room  Call 911  Warning signs that I or other people might notice when a crisis is  developing for me: irritable, get mad over small things, feeliing sad,  lonely, not using resources  Things I am able to do on my own to cope or help me feel better:  use coping strategies, take deep breaths, take a shower, do an art  project, ask for PRN medication.  Things that I am able to do with others to cope or help me better:  talk with staff, care providers, family, go on an outing, take a walk  Things I can use or do for distraction: listen to music, color, draw, take  a shower, talk to a friend  Changes I can make to support my mental health and wellness: take  medications as prescribed, count to 50 before making impulsive  decisions, get enough sleep, exercise  People in my life that I can ask for help: family, group home staff,  therapist, providers  Your [facility] has a mental health crisis team you can call 24/7:  [facility] Mobile Crisis  [phone]  Other things that are important when I'm in crisis: Slow down, take  several deep breaths, move to a safe place, give phone to staff to hold  until feeling better, ask for help  Additional resources and information: The following DBT skills can  assist me when: I want to act on your emotions and acting on them  will only make things worse, I am overwhelmed by my emotions, I  want to try to be skillful and not act on self destructive behavior.  Reduce Extreme Emotion QUICKLY: Changing Your Body  Chemistry  T: Change your body Temperature to change your autonomic nervous system  Use Ice pack to calm yourself down FAST. Place ice pack underneath your eyes for a count of 30 seconds to initiate the  divers reflex which will naturally calm down your heart rate and breathing.  I: Intensely exercise to calm down a body revved up by emotion  Examples: running, walking fast, jumping, playing basketball, weight lifting, swimming, calisthenics, etc.  Engage in exercises that DO NOT include violent behaviors. Exercises that utilize violent behaviors tend to function as   behavioral rehearsal,  and rather than calming the person down, may actually  rev  the person up more, increasing the  likelihood of violence, and lessening the likelihood that they will  burn off  energy  P: Progressively relax your muscles  Starting with your hands, moving to your forearms, upper arms, shoulders, neck, forehead, eyes, cheeks and lips,  tongue and teeth, chest, upper back, stomach, buttocks, thighs, calves, ankles, feet  Tense (10 seconds, Â  of the way), then relax each muscle (all the way)  Notice the tension  Notice the difference when relaxed (by tensing first, and then relaxing, you are able to get a more thorough  relaxation than by simply relaxing)  P: Paced breathing to relax  The standard technique is to begin with counting the number of steps one takes for a typical inhale, then counting the  steps one takes for a typical exhale, and then lengthening the amount of steps for the exhalation by one or two steps.  OR repeat this pattern for 1-2 minutes:  Inhale for four (4) seconds  Exhale for six (6) to eight (8) seconds  After using Distress Tolerance TIPP, TRY TO STOP!  S- Stop  Do not just react on your emotion urge. Stop! Freeze! Do not move a muscle! Your emotions may try to make you act  without thinking. Stay in control! Take a step back Take a step back from the situation.  T- Take a break  Let go. Take a deep breath.  Do not let your feelings make you act impulsively.  O- Observe  Notice what is going on inside and outside you. What is the situation? What are your thoughts and feelings? What are  others saying or doing? Does my emotion make sense, is it justified? What is it that my emotions want me to do?  Would that be effective?  P- Proceed mindfully  Act with awareness. In deciding what to do, consider your thoughts and feelings, the situation, and other people s  thoughts and feelings. Think about your goals. Ask Wise Mind: Which actions will make it better or worse?  If my emotion action urge would not be effective or helpful, practice acting OPPOSITE to the EMOTION ACTION  URGE can help reduce the intensity or even change the emotion.  Consider these examples: with FEAR we have the urge to run away/avoid. OPPOSITE would be to approach it with  caution. ANGER we have the urge to attack. OPPOSITE would be to gently avoid or to demonstrate kindness towards it.  SADNESS we have the urge to withdraw/isolate. OPPOSITE would be to get self to move and be active physically or  socially.  These additional skills may help with self-soothing and distracting you:  Activities  Focus attention on a task you need to get done. Rent movies; watch TV. Clean a room in your house. Find an event to  go to. Play computer games. Go walking. Exercise. Surf the Internet. Write e-mails. Play sports. Go out for a meal or eat  a favorite food. Call or go out with a friend. Listen to your iPod; download music. Build something. Spend time with your  children. Play cards. Read magazines, books, comics. Do crossword puzzles or Sudoku.  Emotions  Read emotional books or stories, old letters. Watch emotional TV shows; go to emotional movies. Listen to emotional  music. (Be sure the event creates different emotions.) Ideas: Scary movies, joke books, comedies, funny records,  Buddhist music, soothing music or music that fires you up, going to a store and  reading funny greeting cards.  Thoughts  Count to 10; count colors in a painting or poster or out the window; count anything. Repeat words to a song in your  mind. Work puzzles. Watch TV or read.  Sensations  Squeeze a rubber ball very hard. Listen to very loud music. Hold ice in your hand or mouth. Go out in the rain or snow.  Take a hot or cold shower.  Remember that you can use your 5 senses as helpful self-soothing tools!  I can help my own emotions by practicing the following to keep my emotional mind healthy and bring positive  emotions:  The ABC PLEASE skill is about taking good care of ourselves so that we can take care of others. Also, an important  component of DBT is to reduce our vulnerability. When we take good care of ourselves, we are less likely to be  vulnerable to disease and emotional crisis.  ABC  A- Accumulate positive emotions by doing things that are pleasant.  B- Build mastery by doing things we enjoy. Whether it is reading, cooking, cleaning, fixing a car, working a cross word  puzzle, or playing a musical instrument. Practice these things to  and in time we feel competent.  C- [place] Ahead by rehearsing a plan ahead of time so that we can be prepared to cope skillfully. (Think of what makes  situations difficult, and what helps in those situations)  PLEASE  Treat Physical Illness and take medications as prescribed.  Balance eating in order to avoid mood swings.  Avoid mood-Altering substances and have mood control.  Maintain good sleep so you can enjoy your life.  Get exercise to maintain high spirits.  Crisis Lines  Crisis Text Line  Text 940727  You will be connected with a trained live crisis counselor to provide support.  Por espanol, texto SIRENA a 667938 o texto a 442-AYUDAME en WhatsApp  The Mikey Project (LGBTQ Youth Crisis Line)  7.059.204.0741  text START to 229-834  Community Resources  Fast Tracker  Linking people to mental health and substance use disorder  "resources  [employer]   [place] Mental Health Warm Line  Peer to peer support  Monday thru Saturday, 12 pm to 10 pm  937.887.1109 or  1.869.630.2572  Text \"Support\" to 28528  National [place] on Mental Illness (MAGY)  913.682.3349 or 1.888.MAGY.HELPS  Mental Health Apps  My3  https://[employer].org/  VirtualHopeBox  https://[employer]/apps/virtual-hope-box/  Additional Information  Today you were seen by a licensed mental health professional through Triage and Transition services, Behavioral  Healthcare Providers ([facility]) for a crisis assessment in the Emergency Department at [facility]. It is  recommended that you follow up with your established providers (psychiatrist, mental health therapist, and/or primary  care doctor - as relevant) as soon as possible. Coordinators from [facility] will be calling you in the next 24-48 hours to  ensure that you have the resources you need. You can also contact [facility] coordinators directly at [phone]. You may  have been scheduled for or offered an appointment with a mental health provider. [facility] maintains an extensive network  of licensed behavioral health providers to connect patients with the services they need. We do not charge providers a  fee to participate in our referral network. We match patients with providers based on a patient's specific needs,  insurance coverage, and location. Our first effort will be to refer you to a provider within your care system, and will  utilize providers outside your care system as needed.    "

## 2023-01-02 NOTE — ED PROVIDER NOTES
"ED Provider Note  [facility]      History     Chief Complaint   Patient presents with     Suicidal     Pt called 911, initially stating anxiety and nausea. Then stated suicidal ideation to paramedic, when asked directly-pt denied SI.vomiting.     Anxiety     Nausea     HPI  Sadaf Ross is a 22 year old female who presented to the ED today via EMS for mental health evaluation.  She has a hx of ADHD, bipolar disorder, depression, borderline personality disorder, intellectual disability. She has been seen many, many, many times in the ED for this same complaint.  She currently resides in a group home and is well-known for frequent ED visits.  The patient is completely uncooperative with me, when I sit down and ask what is going on, she tells me to \"get the fuck away from me\".  She is unwilling to answer any questions whatsoever.  When I persisted in asking questions of her, she said that she would hit me.  It is noteworthy that she is sitting in a chair in the hallway and observing the charli on in the department including many highly acute mental health patients at present.  She has been calm and not aggressive thus far but will not engage in an interview or answer my questions.     Evidently she called paramedics herself and told them she was nauseated and anxious. When EMS arrived she then said she was suicidal, but when EMS asked her directly she denied this. When she arrived at triage, she told the triage RN that she was suicidal with plan to bite herself and \"bleed myself to death\".      Past Medical History:   Diagnosis Date     ADHD (attention deficit hyperactivity disorder)      Bipolar 1 disorder (H)      Borderline personality disorder (H)      Depression      Depressive disorder      Intellectual disability      Obesity      Syncope        Past Surgical History:   Procedure Laterality Date     APPENDECTOMY       APPENDECTOMY         Family History   Problem Relation Age of " Onset     Diabetes Type 1 Father      Cancer Paternal Grandfather        Social History     Tobacco Use     Smoking status: Every Day     Packs/day: 1.00     Years: 5.00     Pack years: 5.00     Types: Vaping Device, Cigarettes     Smokeless tobacco: Never   Substance Use Topics     Alcohol use: No         Past Medical History  Past Medical History:   Diagnosis Date     ADHD (attention deficit hyperactivity disorder)      Bipolar 1 disorder (H)      Borderline personality disorder (H)      Depression      Depressive disorder      Intellectual disability      Obesity      Syncope      Past Surgical History:   Procedure Laterality Date     APPENDECTOMY       APPENDECTOMY       acetaminophen (TYLENOL) 325 MG tablet  alum & mag hydroxide-simethicone (MAALOX  ES) 400-400-40 MG/5ML SUSP suspension  ARIPiprazole lauroxil ER (ARISTADA) 882 MG/3.2ML intra-muscular  benztropine (COGENTIN) 1 MG tablet  calcium carbonate (TUMS) 500 MG chewable tablet  clobetasol (TEMOVATE) 0.05 % CREA cream  cyclobenzaprine (FLEXERIL) 10 MG tablet  diclofenac (VOLTAREN) 1 % topical gel  docusate sodium (COLACE) 50 MG capsule  fluticasone (FLONASE) 50 MCG/ACT nasal spray  guaiFENesin (ROBITUSSIN) 100 MG/5ML SYRP  hydrocortisone (CORTAID) 0.5 % external cream  hydrOXYzine (ATARAX) 50 MG tablet  hyoscyamine (LEVSIN/SL) 0.125 MG sublingual tablet  ibuprofen (ADVIL/MOTRIN) 400 MG tablet  loperamide (IMODIUM) 2 MG capsule  LORazepam (ATIVAN) 0.5 MG tablet  multivitamin, therapeutic (THERA-VIT) TABS tablet  OLANZapine zydis (ZYPREXA) 5 MG ODT  omeprazole (PRILOSEC) 40 MG DR capsule  ondansetron (ZOFRAN) 4 MG tablet  polyethylene glycol (MIRALAX) 17 GM/Dose powder  prochlorperazine (COMPAZINE) 10 MG tablet  promethazine (PHENERGAN) 12.5 MG tablet  sennosides (SENOKOT) 8.6 MG tablet  traZODone (DESYREL) 50 MG tablet  venlafaxine (EFFEXOR-ER) 225 MG 24 hr tablet  Vitamin D, Cholecalciferol, 25 MCG (1000 UT) TABS      Allergies   Allergen Reactions      "Penicillins Rash and Unknown     Family History  Family History   Problem Relation Age of Onset     Diabetes Type 1 Father      Cancer Paternal Grandfather      Social History   Social History     Tobacco Use     Smoking status: Every Day     Packs/day: 1.00     Years: 5.00     Pack years: 5.00     Types: Vaping Device, Cigarettes     Smokeless tobacco: Never   Substance Use Topics     Alcohol use: No     Drug use: No      Past medical history, past surgical history, medications, allergies, family history, and social history were reviewed with the patient. No additional pertinent items.       Review of Systems   Unable to perform ROS: Psychiatric disorder     A complete review of systems was attempted but limited due to psychiatric disorder.    Physical Exam   BP: 122/77  Pulse: 97  Temp: 98.6  F (37  C)  Resp: 18  SpO2: 98 %  Physical Exam  Vitals and nursing note reviewed.   Constitutional:       Comments: Patient is angry and hostile. She is not physically aggressive but is not willing to cooperate with questions.    HENT:      Head: Normocephalic.   Cardiovascular:      Rate and Rhythm: Normal rate.   Pulmonary:      Effort: Pulmonary effort is normal.   Neurological:      General: No focal deficit present.   Psychiatric:      Comments: Patient is angry and hostile. Refuses to engage in answering questions.  Verbally threatens to hit me if I ask additional questions.  Repeatedly stating \"get the fuck away from me\".  Will not answer questions about suicidality, homicidality, or hallucinations.  Not physically aggressive at this time.         ED Course      Procedures       The medical record was reviewed and interpreted.  Mental Health Risk Assessment      PSS-3    Date and Time Over the past 2 weeks have you felt down, depressed, or hopeless? Over the past 2 weeks have you had thoughts of killing yourself? Have you ever attempted to kill yourself? When did this last happen? User   01/01/23 2048 yes yes yes " "within the last month (but not today) TL      C-SSRS ([place])    Date and Time Q1 Wished to be Dead (Past Month) Q2 Suicidal Thoughts (Past Month) Q3 Suicidal Thought Method Q4 Suicidal Intent without Specific Plan Q5 Suicide Intent with Specific Plan Q6 Suicide Behavior (Lifetime) Within the Past 3 Months? RETIRED: Level of Risk per Screen Screening Not Complete User   01/01/23 2048 yes yes yes no yes yes -- -- -- TL              Suicide assessment completed by mental health (D.E.C., LCSW, etc.)       No results found for any visits on 01/01/23.  Medications - No data to display     Assessments & Plan (with Medical Decision Making)   Patient presents to the emergency department today for the above complaints.  She very frequently presents for very similar complaints and is in a group home due to her chronic persistent mental illness.  She is refusing to cooperate with me or engage in questions whatsoever, actively displays hostility toward me. This has been fairly standard behavior for her recently in the ED.    She was seen briefly by the [facility]  and she told her she didn't remember why she came to the ER today. She endorsed SI with plan to \"scratch myself to death\". No psychosis. Group home was contacted and they report nothing unusual about her day today or her behavior - this has been baseline.     Despite her SI with a plan to \"scratch myself\" this is not a dangerous mechanism and does not require inpatient admission.  She is in a group home where she is monitored closely.    She will be discharged at this time.     I have reviewed the nursing notes. I have reviewed the findings, diagnosis, plan and need for follow up with the patient.    Medical Decision Making  The patient presented with a problem that is a stable chronic illness.    The patient's evaluation involved:  an assessment requiring an independent historian (see separate area of note for details)  review of 3+ prior external note(s) (see " separate area of note for details)    The patient's management involved only simple and very low risk treatment.        New Prescriptions    No medications on file       Final diagnoses:   Borderline personality disorder (H)   Suicidal ideation - chronic, unchanged       --  Deepa Pelaez MD  [facility] EMERGENCY DEPARTMENT  1/1/2023     Deepa Pelaez MD  01/02/23 0005

## 2023-01-03 ENCOUNTER — NURSE TRIAGE (OUTPATIENT)
Dept: NURSING | Facility: CLINIC | Age: 23
End: 2023-01-03

## 2023-01-03 NOTE — ED NOTES
Writer notified of patient escalating behaviors by another RN. De-escalation team responded to the patient. The patient was offered comfort measures, therapeutic conversation, healing presence, food/fluids, oral PRN medications the patient declined. Writer witnessed the patient hitting herself in the head with both fists. Writer physically held hands in attempt to prevent patient injury. Patient tolerated this without problem. Writer called MD to notify her of the patient's condition and escalating behaviors, MD placed orders for IM PRN medication. When writer went back to direct the code team of the plan, writer was told the patient told another staff member that she would hit anyone that touched her. Writer then offered all prior de-escalating measures, including talking through what was going on. Patient declined. Patient accepted IM medication rolled up her sleeve and sat calmly as writer administered the medication. Sat with patient for an additional 10 minutes after administration to ensure patient safety. Pillow was placed on chair to prevent patient from self injurious behaviors. One to one present for safety.

## 2023-01-03 NOTE — ED NOTES
Bed: [facility]  Expected date:   Expected time:   Means of arrival:   Comments:  [id number]  22y F  SI

## 2023-01-03 NOTE — ED PROVIDER NOTES
"ED Provider Note  [facility]      History     Chief Complaint   Patient presents with     Suicidal     At grocery store, thoughts of SI.      HPI  Sadaf Ross is a 22 year old female with borderline personality disorder, bipolar disorder, intellectual disability who presents to the ED says she was having suicidal thoughts.  Group home staff said the patient was having a good day.  They had gone grocery shopping around 5 pm.  The patient came home and took her meds and said she was going to be nice.  She then called 911.  She said she ate too much and is wa making her suicidal.  Group home staff says nothing is different and patient is at baseline.     Past Medical and Surgical History, Medications, Allergies, and Social History were reviewed in the electronic medical record. Review with the patient was attempted but limited due to patient mental health at baseline.      A complete review of systems was attempted but limited due to patient mental health at baseline.    Physical Exam   BP: 139/80  Pulse: 99  Temp: 98.1  F (36.7  C)  Resp: 14  Height: 162.6 cm (5' 4\")  Weight: 107.5 kg (237 lb)  Physical Exam  Vitals and nursing note reviewed.   Constitutional:       General: She is not in acute distress.     Appearance: She is obese. She is not ill-appearing, toxic-appearing or diaphoretic.      Comments: Calmly sitting in the chair   HENT:      Head: Normocephalic and atraumatic.      Nose: No congestion or rhinorrhea.   Eyes:      General: No scleral icterus.  Cardiovascular:      Rate and Rhythm: Normal rate.   Pulmonary:      Effort: Pulmonary effort is normal.   Musculoskeletal:         General: No deformity or signs of injury.      Cervical back: Normal range of motion.   Skin:     Coloration: Skin is not jaundiced or pale.   Neurological:      General: No focal deficit present.      Mental Status: She is alert and oriented to person, place, and time.   Psychiatric:         " Attention and Perception: Attention and perception normal.         Mood and Affect: Mood and affect normal.         Speech: Speech normal.         Behavior: Behavior is withdrawn.         Thought Content: Thought content normal.         Cognition and Memory: Cognition is impaired.         Judgment: Judgment is impulsive and inappropriate.           ED Course, Procedures, & Data      Procedures         Mental Health Risk Assessment      PSS-3    Date and Time Over the past 2 weeks have you felt down, depressed, or hopeless? Over the past 2 weeks have you had thoughts of killing yourself? Have you ever attempted to kill yourself? When did this last happen? User   01/02/23 1809 yes yes yes within the last 24 hours (including today) RB      C-SSRS ([place])    Date and Time Q1 Wished to be Dead (Past Month) Q2 Suicidal Thoughts (Past Month) Q3 Suicidal Thought Method Q4 Suicidal Intent without Specific Plan Q5 Suicide Intent with Specific Plan Q6 Suicide Behavior (Lifetime) Within the Past 3 Months? RETIRED: Level of Risk per Screen Screening Not Complete User   01/02/23 1809 yes yes yes no yes yes -- -- -- RB                Item Assessment   Suicidal Ideation None   Plan none   Intent none   Suicidal or self-harm behaviors none   Risk Factors Previous psychiatric diagnosis and treatments   Protective Factors Likes staff at her group home.               No results found for any visits on 01/02/23.  Medications - No data to display  Labs Ordered and Resulted from Time of ED Arrival to Time of ED Departure - No data to display  No orders to display          Medical Decision Making  The patient presented with a problem that is a stable chronic illness.    The patient's evaluation involved:  review of 3+ prior external note(s) (see separate area of note for details)    The patient's management involved only simple and very low risk treatment.      Assessment & Plan    The patient is well known to the ED and comes to the ED  due to enjoying getting needs met here.  She was seen yesterday and 12/30 and again comes frequently. She frequently talks about feeling suicidal.  This is baseline for her.  Group home staff says she was fine prior to patient calling ems.  She has a care plan that discourages admission given it has not been helpful in the past and tends to reinforce wanting to come to the ED.  She was confronted by dec  a few days ago for this behavior and I again talked to her about this.  She put her head in her arm and didn't want to talk to me.  She had just taken her pm meds prior to arrival.  Will allow to rest for a bit then discharge back to group home.  group home staff agrees with this plan.     Patient told she will be going home and she started hitting her head.  zyprexa 10 mg IM was given. She is calm and cooperative and discharged back to group home via ambulance.     I have reviewed the nursing notes. I have reviewed the findings, diagnosis, plan and need for follow up with the patient.    New Prescriptions    No medications on file       Final diagnoses:   Borderline personality disorder (H)   Intellectual disability       Ashely Toth MD  [facility] EMERGENCY DEPARTMENT  1/2/2023     Ashely Toth MD  01/02/23 2040

## 2023-01-03 NOTE — TELEPHONE ENCOUNTER
Triage Call:     Pt calling to report that she is in her room laying in bed and is thinking about killing herself. Pt reports that she was in the ED yesterday with the same thoughts of suicide.    Pt reports that she has not reached out to her PCP since her discharge from the ED yesterday and was planning on doing that tomorrow.     Due to patient's self-report of threats of suicide right now it was recommended that she call 911 and be re-evaluated in the ED. She reports that she agrees with this plan and she will call 911 herself right now.     Elisabeth Wilcox RN  [facility] Nurse Advisor 12:30 PM 1/3/2023      Reason for Disposition    Patient is threatening suicide now    Additional Information    Negative: Patient attempted suicide    Protocols used: SUICIDE [id number]

## 2023-01-11 ENCOUNTER — NURSE TRIAGE (OUTPATIENT)
Dept: OBGYN | Facility: CLINIC | Age: 23
End: 2023-01-11

## 2023-01-11 NOTE — TELEPHONE ENCOUNTER
"Patient called stated she has been nauseas x2 weeks and vomited twice today. Unable to keep down fluids as \"I sip on water and I throw up\". Per pt feels she has clammy skin and stated she feels dizzy and weak.     Per triage protocol pt needs tp gp to ED. Pt asked about guardianship which I do now know about but I insisted she goes to ED due to possible dehydration. Pt agreeable.     Thanks,  EZEQUIEL Aldrich  [facility]       Reason for Disposition    Shock suspected (e.g., cold/pale/clammy skin, too weak to stand, low BP, rapid pulse)    Answer Assessment - Initial Assessment Questions  1. VOMITING SEVERITY: \"How many times have you vomited in the past 24 hours?\"      - MILD:  1 - 2 times/day     - MODERATE: 3 - 5 times/day, decreased oral intake without significant weight loss or symptoms of dehydration     - SEVERE: 6 or more times/day, vomits everything or nearly everything, with significant weight loss, symptoms of dehydration       Per pt vomit x2 this morning      \"all over abdominal pain\"  2. ONSET: \"When did the vomiting begin?\"       \"a couple days of vomiting and a couple of weeks of nausea\"  3. FLUIDS: \"What fluids or food have you vomited up today?\" \"Have you been able to keep any fluids down?\"      \"Trying to drink water but it comes up\"  4. ABDOMINAL PAIN: \"Are your having any abdominal pain?\" If yes : \"How bad is it and what does it feel like?\" (e.g., crampy, dull, intermittent, constant)       \"cramping and sharp\"  5. DIARRHEA: \"Is there any diarrhea?\" If Yes, ask: \"How many times today?\"       No  6. CONTACTS: \"Is there anyone else in the family with the same symptoms?\"       Not that pt knows of   7. CAUSE: \"What do you think is causing your vomiting?\"      No I don't know. I got my flu shot   8. HYDRATION STATUS: \"Any signs of dehydration?\" (e.g., dry mouth [not only dry lips], too weak to stand) \"When did you last urinate?\"      Urinated this AM, urinates 3x per day   9. OTHER SYMPTOMS: " "\"Do you have any other symptoms?\" (e.g., fever, headache, vertigo, vomiting blood or coffee grounds, recent head injury)      Headache and dizziness         10. PREGNANCY: \"Is there any chance you are pregnant?\" \"When was your last menstrual period?\"        Per pt no    Protocols used: VOMITING-A-OH      "

## 2023-01-14 ENCOUNTER — HOSPITAL ENCOUNTER (EMERGENCY)
Facility: CLINIC | Age: 23
Discharge: NURSING, REHAB OR LONG-TERM CARE | End: 2023-01-14
Attending: EMERGENCY MEDICINE | Admitting: EMERGENCY MEDICINE
Payer: MEDICARE

## 2023-01-14 ENCOUNTER — APPOINTMENT (OUTPATIENT)
Dept: GENERAL RADIOLOGY | Facility: CLINIC | Age: 23
End: 2023-01-14
Attending: EMERGENCY MEDICINE
Payer: MEDICARE

## 2023-01-14 VITALS
HEART RATE: 79 BPM | SYSTOLIC BLOOD PRESSURE: 124 MMHG | DIASTOLIC BLOOD PRESSURE: 66 MMHG | OXYGEN SATURATION: 98 % | RESPIRATION RATE: 18 BRPM | TEMPERATURE: 98.3 F

## 2023-01-14 DIAGNOSIS — R05.1 ACUTE COUGH: ICD-10-CM

## 2023-01-14 LAB
FLUAV RNA SPEC QL NAA+PROBE: NEGATIVE
FLUBV RNA RESP QL NAA+PROBE: NEGATIVE
RSV RNA SPEC NAA+PROBE: NEGATIVE
SARS-COV-2 RNA RESP QL NAA+PROBE: NEGATIVE

## 2023-01-14 PROCEDURE — 71046 X-RAY EXAM CHEST 2 VIEWS: CPT

## 2023-01-14 PROCEDURE — 99284 EMERGENCY DEPT VISIT MOD MDM: CPT | Mod: CS,25 | Performed by: EMERGENCY MEDICINE

## 2023-01-14 PROCEDURE — 87637 SARSCOV2&INF A&B&RSV AMP PRB: CPT | Performed by: EMERGENCY MEDICINE

## 2023-01-14 PROCEDURE — C9803 HOPD COVID-19 SPEC COLLECT: HCPCS | Performed by: EMERGENCY MEDICINE

## 2023-01-14 PROCEDURE — 99284 EMERGENCY DEPT VISIT MOD MDM: CPT | Mod: CS | Performed by: EMERGENCY MEDICINE

## 2023-01-14 RX ORDER — BENZONATATE 200 MG/1
200 CAPSULE ORAL 3 TIMES DAILY PRN
Qty: 15 CAPSULE | Refills: 0 | Status: SHIPPED | OUTPATIENT
Start: 2023-01-14 | End: 2023-07-14

## 2023-01-14 RX ORDER — GUAIFENESIN 600 MG/1
1200 TABLET, EXTENDED RELEASE ORAL 2 TIMES DAILY
Qty: 20 TABLET | Refills: 0 | Status: SHIPPED | OUTPATIENT
Start: 2023-01-14 | End: 2023-07-14

## 2023-01-14 ASSESSMENT — ACTIVITIES OF DAILY LIVING (ADL): ADLS_ACUITY_SCORE: 37

## 2023-01-14 NOTE — ED PROVIDER NOTES
History     Chief Complaint   Patient presents with     Cough     Pt is complaining of cough that is going on for 2 days with whitish sputum, spiting all the time. Pt called [facility] and told her to come here at the ED. 100% on RA and sinus rhythm, per EMS.     HPI  Sadaf Ross is a 22 year old female with a past medical history of ADHD, BPD, obesity who presents to the emergency department with a chief complaint of cough. Started 2 days ago. It is productive of white sputum. She has some chest pain that is mild and present when coughing only. She called [facility] and told her to come here at the ED. 100% on RA and sinus rhythm, per EMS.     I have reviewed the Medications, Allergies, Past Medical and Surgical History, and Social History in the [facility] system.    Past Medical History:   Diagnosis Date     ADHD (attention deficit hyperactivity disorder)      Bipolar 1 disorder (H)      Borderline personality disorder (H)      Depression      Depressive disorder      Intellectual disability      Obesity      Syncope      Past Surgical History:   Procedure Laterality Date     APPENDECTOMY       APPENDECTOMY       No current facility-administered medications for this encounter.     Current Outpatient Medications   Medication     acetaminophen (TYLENOL) 325 MG tablet     alum & mag hydroxide-simethicone (MAALOX  ES) 400-400-40 MG/5ML SUSP suspension     ARIPiprazole lauroxil ER (ARISTADA) 882 MG/3.2ML intra-muscular     benztropine (COGENTIN) 1 MG tablet     calcium carbonate (TUMS) 500 MG chewable tablet     clobetasol (TEMOVATE) 0.05 % CREA cream     cyclobenzaprine (FLEXERIL) 10 MG tablet     diclofenac (VOLTAREN) 1 % topical gel     docusate sodium (COLACE) 50 MG capsule     fluticasone (FLONASE) 50 MCG/ACT nasal spray     guaiFENesin (ROBITUSSIN) 100 MG/5ML SYRP     hydrocortisone (CORTAID) 0.5 % external cream     hydrOXYzine (ATARAX) 50 MG tablet     hyoscyamine (LEVSIN/SL) 0.125 MG  sublingual tablet     ibuprofen (ADVIL/MOTRIN) 400 MG tablet     loperamide (IMODIUM) 2 MG capsule     LORazepam (ATIVAN) 0.5 MG tablet     multivitamin, therapeutic (THERA-VIT) TABS tablet     OLANZapine zydis (ZYPREXA) 5 MG ODT     omeprazole (PRILOSEC) 40 MG DR capsule     ondansetron (ZOFRAN) 4 MG tablet     polyethylene glycol (MIRALAX) 17 GM/Dose powder     prochlorperazine (COMPAZINE) 10 MG tablet     promethazine (PHENERGAN) 12.5 MG tablet     sennosides (SENOKOT) 8.6 MG tablet     traZODone (DESYREL) 50 MG tablet     venlafaxine (EFFEXOR-ER) 225 MG 24 hr tablet     Vitamin D, Cholecalciferol, 25 MCG (1000 UT) TABS     Allergies   Allergen Reactions     Penicillins Rash and Unknown     Past medical history, past surgical history, medications, and allergies were reviewed with the patient. Additional pertinent items: None    Social History     Socioeconomic History     Marital status: Single     Spouse name: Not on file     Number of children: Not on file     Years of education: Not on file     Highest education level: Not on file   Occupational History     Not on file   Tobacco Use     Smoking status: Every Day     Packs/day: 1.00     Years: 5.00     Pack years: 5.00     Types: Vaping Device, Cigarettes     Smokeless tobacco: Never   Substance and Sexual Activity     Alcohol use: No     Drug use: No     Sexual activity: Yes     Partners: Female, Male     Birth control/protection: Pill   Other Topics Concern     Not on file   Social History Narrative     Not on file     Social Determinants of Health     Financial Resource Strain: Not on file   Food Insecurity: Not on file   Transportation Needs: Not on file   Physical Activity: Not on file   Stress: Not on file   Social Connections: Not on file   Intimate Partner Violence: Not on file   Housing Stability: Not on file     Social history was reviewed with the patient. Additional pertinent items: None    Review of Systems  A medically appropriate review of  systems was performed with pertinent positives and negatives noted in the HPI, and all other systems negative.    Physical Exam   BP: 134/74  Pulse: 103  Temp: 97.4  F (36.3  C)  Resp: 16  SpO2: 100 %      General: Well nourished, well developed, NAD  HEENT: EOMI, anicteric. NCAT, MMM  Neck: no jugular venous distension, supple, nl ROM  Cardiac: Regular rate and rhythm. No murmurs, rubs, or gallops. Normal S1, S2.  Intact peripheral pulses  Pulm: CTAB, no stridor, wheezes, rales, rhonchi  Abd: Soft, nontender, nondistended.  No masses palpated.    Skin: Warm and dry to the touch.  No rash  Extremities: No LE edema, no cyanosis, w/w/p  Neuro: A&Ox3, no gross focal deficits    ED Course        Procedures                            Labs Ordered and Resulted from Time of ED Arrival to Time of ED Departure   INFLUENZA A/B & SARS-COV2 PCR MULTIPLEX - Normal       Result Value    Influenza A PCR Negative      Influenza B PCR Negative      RSV PCR Negative      SARS CoV2 PCR Negative              Results for orders placed or performed during the hospital encounter of 01/14/23 (from the past 24 hour(s))   Symptomatic Influenza A/B & SARS-CoV2 (COVID-19) Virus PCR Multiplex Nose    Specimen: Nose; Swab   Result Value Ref Range    Influenza A PCR Negative Negative    Influenza B PCR Negative Negative    RSV PCR Negative Negative    SARS CoV2 PCR Negative Negative    Narrative    Testing was performed using the Xpert Xpress CoV2/Flu/RSV Assay on the [employer]rix GeneXpert Instrument. This test should be ordered for the detection of SARS-CoV-2 and influenza viruses in individuals who meet clinical and/or epidemiological criteria. Test performance is unknown in asymptomatic patients. This test is for in vitro diagnostic use under the FDA EUA for laboratories certified under CLIA to perform high or moderate complexity testing. This test has not been FDA cleared or approved. A negative result does not rule out the presence of PCR  inhibitors in the specimen or target RNA in concentration below the limit of detection for the assay. If only one viral target is positive but coinfection with multiple targets is suspected, the sample should be re-tested with another FDA cleared, approved, or authorized test, if coinfection would change clinical management. This test was validated by the [facility]. These laboratories are certified under the Clinical Laboratory Improvement Amendments of 1988 (CLIA-88) as qualified to perform high complexity laboratory testing.   XR Chest 2 Views    Narrative    EXAM: XR CHEST 2 VIEWS  LOCATION: [facility]  DATE/TIME: 1/14/2023 6:30 PM    INDICATION: cough  COMPARISON: None.      Impression    IMPRESSION: Negative chest.       Labs, vital signs, and imaging studies were reviewed by me.    Medications - No data to display    Assessments & Plan (with Medical Decision Making)   Sadaf Ross is a 22 year old female who presents with cough. Ddx includes pneumonia, bronchitis, influenza, covid-19 infection, RSV, other viral syndrome.    CXR shows NAD    Covid/Flu/RSV testing negative    Medical Decision Making  The patient presented with a problem that is acute and uncomplicated.    The patient's evaluation involved:  review of 3+ prior external note(s) (see separate area of note for details)  ordering and review of 2 test(s) (see separate area of note for details)  independent interpretation of testing performed by another health professional (CXR)    The patient's management involved prescription drug management and limitations due to social determinants of health.    I have reviewed the nursing notes.    I have reviewed the findings, diagnosis, plan and need for follow up with the patient.    Patient to be discharged home. Advised to follow up with PCP within 1 week. To return to ER immediately with any new/worsening symptoms. Plan of care discussed  with patient who expresses understanding and agrees with plan of care.    Discharge Medication List as of 1/14/2023  6:55 PM      START taking these medications    Details   benzonatate (TESSALON) 200 MG capsule Take 1 capsule (200 mg) by mouth 3 times daily as needed for cough, Disp-15 capsule, R-0, E-Prescribe      guaiFENesin (MUCINEX) 600 MG 12 hr tablet Take 2 tablets (1,200 mg) by mouth 2 times daily, Disp-20 tablet, R-0, E-Prescribe             Final diagnoses:   Acute cough       Tori Whalen MD  1/14/2023   [facility] EMERGENCY DEPARTMENT     Tori Whalen MD  01/14/23 4424

## 2023-01-14 NOTE — DISCHARGE INSTRUCTIONS
TODAY'S VISIT:  You were seen today for cough   -   - If you had any labs or imaging/radiology tests performed today, you should also discuss these tests with your usual provider.     FOLLOW-UP:  Please make an appointment to follow up with:  - Your Primary Care Provider. If you do not have a PCP, please call the Primary Care Center (phone: [phone] for an appointment    - Have your provider review the results from today's visit with you again to make sure no further follow-up or additional testing is needed based on those results.     RETURN TO THE EMERGENCY DEPARTMENT  Return to the Emergency Department at any time for any new or worsening symptoms or any concerns.

## 2023-01-15 ENCOUNTER — HOSPITAL ENCOUNTER (EMERGENCY)
Facility: CLINIC | Age: 23
Discharge: HOME | End: 2023-01-15
Attending: EMERGENCY MEDICINE | Admitting: EMERGENCY MEDICINE
Payer: MEDICARE

## 2023-01-15 ENCOUNTER — NURSE TRIAGE (OUTPATIENT)
Dept: NURSING | Facility: CLINIC | Age: 23
End: 2023-01-15

## 2023-01-15 VITALS
SYSTOLIC BLOOD PRESSURE: 113 MMHG | DIASTOLIC BLOOD PRESSURE: 54 MMHG | OXYGEN SATURATION: 100 % | HEART RATE: 75 BPM | TEMPERATURE: 98.5 F | RESPIRATION RATE: 16 BRPM

## 2023-01-15 DIAGNOSIS — R11.2 NAUSEA AND VOMITING, UNSPECIFIED VOMITING TYPE: ICD-10-CM

## 2023-01-15 DIAGNOSIS — M54.50 ACUTE BILATERAL LOW BACK PAIN WITHOUT SCIATICA: ICD-10-CM

## 2023-01-15 LAB
ALBUMIN UR-MCNC: NEGATIVE MG/DL
APPEARANCE UR: ABNORMAL
BACTERIA #/AREA URNS HPF: ABNORMAL /HPF
BILIRUB UR QL STRIP: NEGATIVE
COLOR UR AUTO: ABNORMAL
GLUCOSE UR STRIP-MCNC: NEGATIVE MG/DL
HGB UR QL STRIP: NEGATIVE
KETONES UR STRIP-MCNC: NEGATIVE MG/DL
LEUKOCYTE ESTERASE UR QL STRIP: ABNORMAL
MUCOUS THREADS #/AREA URNS LPF: PRESENT /LPF
NITRATE UR QL: NEGATIVE
PH UR STRIP: 5.5 [PH] (ref 5–7)
RBC URINE: 1 /HPF
SP GR UR STRIP: 1.01 (ref 1–1.03)
SQUAMOUS EPITHELIAL: 10 /HPF
UROBILINOGEN UR STRIP-MCNC: NORMAL MG/DL
WBC URINE: 3 /HPF

## 2023-01-15 PROCEDURE — 99283 EMERGENCY DEPT VISIT LOW MDM: CPT | Performed by: EMERGENCY MEDICINE

## 2023-01-15 PROCEDURE — 81001 URINALYSIS AUTO W/SCOPE: CPT | Performed by: EMERGENCY MEDICINE

## 2023-01-15 PROCEDURE — 99284 EMERGENCY DEPT VISIT MOD MDM: CPT | Performed by: EMERGENCY MEDICINE

## 2023-01-15 PROCEDURE — 250N000011 HC RX IP 250 OP 636: Performed by: EMERGENCY MEDICINE

## 2023-01-15 RX ORDER — ONDANSETRON 4 MG/1
4 TABLET, ORALLY DISINTEGRATING ORAL ONCE
Status: COMPLETED | OUTPATIENT
Start: 2023-01-15 | End: 2023-01-15

## 2023-01-15 RX ADMIN — ONDANSETRON 4 MG: 4 TABLET, ORALLY DISINTEGRATING ORAL at 12:15

## 2023-01-15 RX ADMIN — ONDANSETRON 4 MG: 4 TABLET, ORALLY DISINTEGRATING ORAL at 10:48

## 2023-01-15 ASSESSMENT — ACTIVITIES OF DAILY LIVING (ADL)
ADLS_ACUITY_SCORE: 37
ADLS_ACUITY_SCORE: 37

## 2023-01-15 NOTE — TELEPHONE ENCOUNTER
"Triage call  Patient calling woke up with abdominal pain and vomiting at 8 am this morning she rates the pain as constant and  A 9 out of 10.  She does not have anyone to bring her to the hospital so recommended she call the the ambulance to bring her.    Per protocol go to the ED now.  Care advice given.  Verbalizes understanding and agrees with plan.    Nan Saucedo RN   [facility] Nurse Advisor  9:40 AM 1/15/2023        Reason for Disposition    [1] SEVERE pain (e.g., excruciating) AND [2] present > 1 hour    Additional Information    Negative: Shock suspected (e.g., cold/pale/clammy skin, too weak to stand, low BP, rapid pulse)    Negative: Difficult to awaken or acting confused (e.g., disoriented, slurred speech)    Negative: Passed out (i.e., lost consciousness, collapsed and was not responding)    Negative: Sounds like a life-threatening emergency to the triager    Negative: Chest pain    Negative: Pain is mainly in upper abdomen  (if needed ask: \"is it mainly above the belly button?\")    Negative: Followed an abdomen (stomach) injury    Negative: [1] Abdominal pain AND [2] pregnant < 20 weeks    Negative: [1] Abdominal pain AND [2] pregnant 20 or more weeks    Negative: [1] Abdominal pain AND [2] postpartum (from 0 to 6 weeks after delivery)    Protocols used: ABDOMINAL PAIN - FEMALE-A-AH      "

## 2023-01-15 NOTE — TELEPHONE ENCOUNTER
S-(situation): Call from patient who reports 9/10 abdominal pain since 3pm -- all areas of her abdomen. Patient says she almost vomited again when she came home this afternoon. She does have some nausea.    She reports she was told to not eat any food but says she ate some egg rolls.    No burning w/ urination but reports bilateral back pain.    B-(background):   Seen in the ED today and discharged home      A-(assessment): may need to be evaluated; multiple ED visits for both medical and psych.      R-(recommendations): be seen in ED    Reviewed care advice with caller per RN triage protocol guideline.  Advised to call back with worsening symptoms, concerns or questions.   Caller verbalized understanding.          Debi Barnes RN/[facility]    Reason for Disposition    [1] SEVERE pain (e.g., excruciating) AND [2] present > 1 hour    Additional Information    Negative: Shock suspected (e.g., cold/pale/clammy skin, too weak to stand, low BP, rapid pulse)    Negative: Difficult to awaken or acting confused (e.g., disoriented, slurred speech)    Negative: Passed out (i.e., lost consciousness, collapsed and was not responding)    Negative: Sounds like a life-threatening emergency to the triager    Protocols used: ABDOMINAL PAIN - FEMALE-A-AH

## 2023-01-15 NOTE — ED NOTES
Bed: ED07  Expected date: 1/15/23  Expected time:   Means of arrival:   Comments:  [id number]   23 y/o F abdominal pain nausea

## 2023-01-15 NOTE — ED TRIAGE NOTES
According to pt had nausea and vomiting for 3 days, having cups of emesis. Pt can't keep food and water down. She is feeling worse, having headache and chest pain, which is her baseline. She is spitting a lot and unable to sleep. VS stable. BS= 112.

## 2023-01-15 NOTE — ED PROVIDER NOTES
ED Provider Note  [facility]      History     Chief Complaint   Patient presents with     Abdominal Pain     Pt came in with abdominal pain, nausea and vomiting. She said she vomited x 3 this morning. Pt can't keep food and water down. Pt last BM was this morning at 8 am. Pt also came in with SI.      HPI  aSdaf Ross is a 22 year old female with history of intellectual disability, borderline personality disorder, bipolar 1 disorder who is well-known to the ED d/t her frequent ED visits presenting to the ED via EMS for abdominal pain, nausea, and vomiting.   Sxs started at 8 am, has thrown up three times.  Was feelling ok last night. Has pain in left flank that gets worse when she twists.  No neuro sxs. No abd pain otherwise.  Hasnt tried to eat since this started.    Per chart review patient was seen here in the ED yesterday for 2 day hx of acute cough productive of white sputum. Chest XR showed NAD. Covid/flu/RSV testing negative. Given prescriptions for Tessalon and Mucinex. Patient discharged home to f/u with PCP in 1 week.     Past Medical History  Past Medical History:   Diagnosis Date     ADHD (attention deficit hyperactivity disorder)      Bipolar 1 disorder (H)      Borderline personality disorder (H)      Depression      Depressive disorder      Intellectual disability      Obesity      Syncope      Past Surgical History:   Procedure Laterality Date     APPENDECTOMY       APPENDECTOMY       docusate sodium (COLACE) 50 MG capsule  venlafaxine (EFFEXOR-ER) 225 MG 24 hr tablet  acetaminophen (TYLENOL) 325 MG tablet  alum & mag hydroxide-simethicone (MAALOX  ES) 400-400-40 MG/5ML SUSP suspension  ARIPiprazole lauroxil ER (ARISTADA) 882 MG/3.2ML intra-muscular  benzonatate (TESSALON) 200 MG capsule  benztropine (COGENTIN) 1 MG tablet  calcium carbonate (TUMS) 500 MG chewable tablet  clobetasol (TEMOVATE) 0.05 % CREA cream  cyclobenzaprine (FLEXERIL) 10 MG tablet  diclofenac  (VOLTAREN) 1 % topical gel  fluticasone (FLONASE) 50 MCG/ACT nasal spray  guaiFENesin (MUCINEX) 600 MG 12 hr tablet  hydrocortisone (CORTAID) 0.5 % external cream  hydrOXYzine (ATARAX) 50 MG tablet  hyoscyamine (LEVSIN/SL) 0.125 MG sublingual tablet  ibuprofen (ADVIL/MOTRIN) 400 MG tablet  loperamide (IMODIUM) 2 MG capsule  LORazepam (ATIVAN) 0.5 MG tablet  multivitamin, therapeutic (THERA-VIT) TABS tablet  OLANZapine zydis (ZYPREXA) 5 MG ODT  omeprazole (PRILOSEC) 40 MG DR capsule  ondansetron (ZOFRAN) 4 MG tablet  polyethylene glycol (MIRALAX) 17 GM/Dose powder  prochlorperazine (COMPAZINE) 10 MG tablet  promethazine (PHENERGAN) 12.5 MG tablet  sennosides (SENOKOT) 8.6 MG tablet  traZODone (DESYREL) 50 MG tablet  Vitamin D, Cholecalciferol, 25 MCG (1000 UT) TABS      Allergies   Allergen Reactions     Penicillins Rash and Unknown     Past medical history, past surgical history, medications, and allergies were reviewed with the patient. Additional pertinent items: None    Family History  Family History   Problem Relation Age of Onset     Diabetes Type 1 Father      Cancer Paternal Grandfather      Family history was reviewed with the patient. Additional pertinent items: None    Social History  Social History     Tobacco Use     Smoking status: Every Day     Packs/day: 1.00     Years: 5.00     Pack years: 5.00     Types: Vaping Device, Cigarettes     Smokeless tobacco: Never   Substance Use Topics     Alcohol use: No     Drug use: No      Social history was reviewed with the patient. Additional pertinent items: None     A complete review of systems was performed with pertinent positives and negatives noted in the HPI, and all other systems negative.    Physical Exam   BP: 113/54  Pulse: 75  Temp: 98.5  F (36.9  C)  Resp: 16  SpO2: 100 %  Physical Exam  Vitals and nursing note reviewed.   Constitutional:       General: She is not in acute distress.     Appearance: She is not ill-appearing, toxic-appearing or  diaphoretic.   HENT:      Head: Normocephalic and atraumatic.      Mouth/Throat:      Mouth: Mucous membranes are moist.      Pharynx: Oropharynx is clear.   Eyes:      Extraocular Movements: Extraocular movements intact.      Pupils: Pupils are equal, round, and reactive to light.   Cardiovascular:      Rate and Rhythm: Normal rate and regular rhythm.      Heart sounds: Normal heart sounds.   Pulmonary:      Effort: Pulmonary effort is normal. No respiratory distress.   Abdominal:      General: There is no distension.      Palpations: Abdomen is soft. There is no shifting dullness, hepatomegaly or mass.      Tenderness: There is no abdominal tenderness. There is left CVA tenderness. There is no rebound.   Skin:     General: Skin is warm and dry.   Neurological:      General: No focal deficit present.      Mental Status: She is alert and oriented to person, place, and time.   Psychiatric:         Mood and Affect: Mood normal. Mood is not anxious.      Comments: Reports suicidal ideation is at her baseline level of sxs, no specific plan         ED Course, Procedures, & Data      Procedures       Given zofran times 2 with improvement in sxs, taking po water without problems. UA negative  Mental Health Risk Assessment      PSS-3    Date and Time Over the past 2 weeks have you felt down, depressed, or hopeless? Over the past 2 weeks have you had thoughts of killing yourself? Have you ever attempted to kill yourself? When did this last happen? User   01/15/23 1029 yes yes yes -- VCN      C-SSRS ([place])    Date and Time Q1 Wished to be Dead (Past Month) Q2 Suicidal Thoughts (Past Month) Q3 Suicidal Thought Method Q4 Suicidal Intent without Specific Plan Q5 Suicide Intent with Specific Plan Q6 Suicide Behavior (Lifetime) Within the Past 3 Months? RETIRED: Level of Risk per Screen Screening Not Complete User   01/15/23 1029 yes yes yes yes yes yes -- -- -- VCN                     Results for orders placed or performed  during the hospital encounter of 01/15/23   UA with Microscopic reflex to Culture     Status: Abnormal    Specimen: Urine, Midstream   Result Value Ref Range    Color Urine Light Yellow Colorless, Straw, Light Yellow, Yellow    Appearance Urine Slightly Cloudy (A) Clear    Glucose Urine Negative Negative mg/dL    Bilirubin Urine Negative Negative    Ketones Urine Negative Negative mg/dL    Specific Gravity Urine 1.014 1.003 - 1.035    Blood Urine Negative Negative    pH Urine 5.5 5.0 - 7.0    Protein Albumin Urine Negative Negative mg/dL    Urobilinogen Urine Normal Normal, 2.0 mg/dL    Nitrite Urine Negative Negative    Leukocyte Esterase Urine Small (A) Negative    Bacteria Urine Few (A) None Seen /HPF    Mucus Urine Present (A) None Seen /LPF    RBC Urine 1 <=2 /HPF    WBC Urine 3 <=5 /HPF    Squamous Epithelials Urine 10 (H) <=1 /HPF    Narrative    Urine Culture not indicated     Medications   ondansetron (ZOFRAN ODT) ODT tab 4 mg (4 mg Oral Given 1/15/23 1048)   ondansetron (ZOFRAN ODT) ODT tab 4 mg (4 mg Oral Given 1/15/23 1215)     Labs Ordered and Resulted from Time of ED Arrival to Time of ED Departure   ROUTINE UA WITH MICROSCOPIC REFLEX TO CULTURE - Abnormal       Result Value    Color Urine Light Yellow      Appearance Urine Slightly Cloudy (*)     Glucose Urine Negative      Bilirubin Urine Negative      Ketones Urine Negative      Specific Gravity Urine 1.014      Blood Urine Negative      pH Urine 5.5      Protein Albumin Urine Negative      Urobilinogen Urine Normal      Nitrite Urine Negative      Leukocyte Esterase Urine Small (*)     Bacteria Urine Few (*)     Mucus Urine Present (*)     RBC Urine 1      WBC Urine 3      Squamous Epithelials Urine 10 (*)      No orders to display          Medical Decision Making  The patient presented with a problem that is an acute illness with systemic symptoms.    The patient's evaluation involved:  review of 3+ prior external note(s) (freqeunt recent ed  visits)  ordering and review of 1 test(s) (ua)    The patient's management involved prescription drug management.      Assessment & Plan    I proved with zofran and po fluids, ;likely muscular low back pain and nausea unrelated.  Will return to ed for continued or worsening sxs    I have reviewed the nursing notes. I have reviewed the findings, diagnosis, plan and need for follow up with the patient.    New Prescriptions    No medications on file       Final diagnoses:   Nausea and vomiting, unspecified vomiting type   Acute bilateral low back pain without sciatica         [facility] EMERGENCY DEPARTMENT  1/15/2023     Otilio Parker MD  01/15/23 1259

## 2023-01-16 ENCOUNTER — NURSE TRIAGE (OUTPATIENT)
Dept: NURSING | Facility: CLINIC | Age: 23
End: 2023-01-16

## 2023-01-16 ENCOUNTER — HOSPITAL ENCOUNTER (EMERGENCY)
Facility: CLINIC | Age: 23
Discharge: HOME | End: 2023-01-16
Attending: EMERGENCY MEDICINE | Admitting: EMERGENCY MEDICINE
Payer: MEDICARE

## 2023-01-16 VITALS
HEART RATE: 87 BPM | TEMPERATURE: 98 F | SYSTOLIC BLOOD PRESSURE: 130 MMHG | DIASTOLIC BLOOD PRESSURE: 72 MMHG | OXYGEN SATURATION: 100 %

## 2023-01-16 DIAGNOSIS — R11.0 NAUSEA: ICD-10-CM

## 2023-01-16 PROCEDURE — 99284 EMERGENCY DEPT VISIT MOD MDM: CPT | Performed by: EMERGENCY MEDICINE

## 2023-01-16 PROCEDURE — 250N000011 HC RX IP 250 OP 636: Performed by: EMERGENCY MEDICINE

## 2023-01-16 PROCEDURE — 99283 EMERGENCY DEPT VISIT LOW MDM: CPT | Performed by: EMERGENCY MEDICINE

## 2023-01-16 RX ORDER — ONDANSETRON 4 MG/1
4 TABLET, ORALLY DISINTEGRATING ORAL EVERY 8 HOURS PRN
Qty: 10 TABLET | Refills: 0 | Status: SHIPPED | OUTPATIENT
Start: 2023-01-16 | End: 2023-01-19

## 2023-01-16 RX ORDER — ONDANSETRON 4 MG/1
4 TABLET, ORALLY DISINTEGRATING ORAL ONCE
Status: COMPLETED | OUTPATIENT
Start: 2023-01-16 | End: 2023-01-16

## 2023-01-16 RX ADMIN — ONDANSETRON 4 MG: 4 TABLET, ORALLY DISINTEGRATING ORAL at 09:48

## 2023-01-16 ASSESSMENT — ACTIVITIES OF DAILY LIVING (ADL)
ADLS_ACUITY_SCORE: 37
ADLS_ACUITY_SCORE: 37

## 2023-01-16 NOTE — ED PROVIDER NOTES
[facility] EMERGENCY DEPARTMENT ([facility])    1/16/23      ED PROVIDER NOTE        History     Chief Complaint   Patient presents with     Nausea & Vomiting     HPI  Sadaf Ross is a 22 year old female who is well-known to our emergency department, lives in a group home, has a history of nausea, vomiting, and other behavioral health disorders, now presents for nausea and vomiting. She was seen yesterday in the emergency department, treated with Zofran, felt better, and returnd home. Patient reports she also takes Pepto-Bismol which occasionally helps. Denies active SI and is overall calm and linear today. Denies fever, chills, diarrhea, or dysuria.    This part of the medical record was transcribed by Rere Rea Medical Scribe, from a dictation done by Saul Milner MD.     Past Medical History  Past Medical History:   Diagnosis Date     ADHD (attention deficit hyperactivity disorder)      Bipolar 1 disorder (H)      Borderline personality disorder (H)      Depression      Depressive disorder      Intellectual disability      Obesity      Syncope      Past Surgical History:   Procedure Laterality Date     APPENDECTOMY       APPENDECTOMY       acetaminophen (TYLENOL) 325 MG tablet  alum & mag hydroxide-simethicone (MAALOX  ES) 400-400-40 MG/5ML SUSP suspension  ARIPiprazole lauroxil ER (ARISTADA) 882 MG/3.2ML intra-muscular  benzonatate (TESSALON) 200 MG capsule  benztropine (COGENTIN) 1 MG tablet  calcium carbonate (TUMS) 500 MG chewable tablet  clobetasol (TEMOVATE) 0.05 % CREA cream  cyclobenzaprine (FLEXERIL) 10 MG tablet  diclofenac (VOLTAREN) 1 % topical gel  docusate sodium (COLACE) 50 MG capsule  fluticasone (FLONASE) 50 MCG/ACT nasal spray  guaiFENesin (MUCINEX) 600 MG 12 hr tablet  hydrocortisone (CORTAID) 0.5 % external cream  hydrOXYzine (ATARAX) 50 MG tablet  hyoscyamine (LEVSIN/SL) 0.125 MG sublingual tablet  ibuprofen (ADVIL/MOTRIN) 400 MG tablet  loperamide (IMODIUM) 2 MG  capsule  LORazepam (ATIVAN) 0.5 MG tablet  multivitamin, therapeutic (THERA-VIT) TABS tablet  OLANZapine zydis (ZYPREXA) 5 MG ODT  omeprazole (PRILOSEC) 40 MG DR capsule  ondansetron (ZOFRAN) 4 MG tablet  polyethylene glycol (MIRALAX) 17 GM/Dose powder  prochlorperazine (COMPAZINE) 10 MG tablet  promethazine (PHENERGAN) 12.5 MG tablet  sennosides (SENOKOT) 8.6 MG tablet  traZODone (DESYREL) 50 MG tablet  venlafaxine (EFFEXOR-ER) 225 MG 24 hr tablet  Vitamin D, Cholecalciferol, 25 MCG (1000 UT) TABS      Allergies   Allergen Reactions     Penicillins Rash and Unknown     Family History  Family History   Problem Relation Age of Onset     Diabetes Type 1 Father      Cancer Paternal Grandfather      Social History   Social History     Tobacco Use     Smoking status: Every Day     Packs/day: 1.00     Years: 5.00     Pack years: 5.00     Types: Vaping Device, Cigarettes     Smokeless tobacco: Never   Substance Use Topics     Alcohol use: No     Drug use: No      Past medical history, past surgical history, medications, allergies, family history, and social history were reviewed with the patient. No additional pertinent items.      A medically appropriate review of systems was performed with pertinent positives and negatives noted in the HPI, and all other systems negative.    Physical Exam   BP: 118/72  Pulse: 103  Temp: 98  F (36.7  C)  SpO2: 98 %  Physical Exam  Constitutional:       General: She is not in acute distress.     Appearance: She is not diaphoretic.      Comments: appears comfortable   HENT:      Head: Atraumatic.      Mouth/Throat:      Pharynx: No oropharyngeal exudate.   Eyes:      General: No scleral icterus.     Pupils: Pupils are equal, round, and reactive to light.   Cardiovascular:      Heart sounds: Normal heart sounds.   Pulmonary:      Effort: No respiratory distress.      Breath sounds: Normal breath sounds.   Abdominal:      General: Bowel sounds are normal. There is no distension.       Palpations: Abdomen is soft.      Tenderness: There is no abdominal tenderness. Negative signs include Santana's sign and McBurney's sign.      Comments: has emesis bag at bedside   Musculoskeletal:         General: No tenderness.   Skin:     General: Skin is warm.      Findings: No rash.         ED Course, Procedures, & Data      Procedures         Mental Health Risk Assessment      PSS-3    Date and Time Over the past 2 weeks have you felt down, depressed, or hopeless? Over the past 2 weeks have you had thoughts of killing yourself? Have you ever attempted to kill yourself? When did this last happen? User   01/16/23 0928 yes yes yes -- LK                Item Assessment   Suicidal Ideation None   Plan none   Intent none   Suicidal or self-harm behaviors .   Risk Factors Previous psychiatric diagnosis and treatments   Protective Factors Lives in group home, in ED for medical reason              No results found for any visits on 01/16/23.  Medications   ondansetron (ZOFRAN ODT) ODT tab 4 mg (4 mg Oral Given 1/16/23 0948)     Labs Ordered and Resulted from Time of ED Arrival to Time of ED Departure - No data to display  No orders to display          Medical Decision Making  The patient presented with a problem that is an acute illness with systemic symptoms.    The patient's evaluation involved:  review of 3+ prior external note(s) (see separate area of note for details)  review of 3+ test result(s) ordered prior to this encounter (see separate area of note for details)    The patient's management involved prescription drug management.      Assessment & Plan    Reported nausea/vomiting in a benign abdomen with stable vital signs. Offered to give IV nausea medications to patient, she declined an IV, and would rather try oral medications.  Given that there are no surgical abdomen concerns on my evaluation, will give oral Zofran and perform p.o. hydration trial. I reviewed past imaging including a chest x-ray, which was  performed yesterday, which showed no free air in her abdomen, as well as an ultrasound of her abdomen on 9/18/2022 which showed no gallstones or gallbladder pathology. Reassess for disposition.    12pm:   Patient has tolerated crackers and water after 4mg po zofran.  Appears comfortable.  Slept in ED. Repeat exam: soft, nt/nd.  Will respect patients wishes for no iv and discussed with patient return precautions.  Will discharge to observed setting in group home w prn zofran for nausea.      This part of the medical record was transcribed by Cindy Shook Scribe, from a dictation done by Saul Milner MD.     I have reviewed the nursing notes. I have reviewed the findings, diagnosis, plan and need for follow up with the patient.    New Prescriptions    No medications on file       Final diagnoses:   Nausea     I, Rere Rea, am serving as a trained medical scribe to document services personally performed by Saul Milner MD based on the provider's statements to me on January 16, 2023.  This document has been checked and approved by the attending provider.    I, Saul Milner MD, was physically present and have reviewed and verified the accuracy of this note documented by cindy Shook scribe.        Saul Milner MD  [facility] EMERGENCY DEPARTMENT  1/16/2023     Saul Milner MD  01/16/23 1165

## 2023-01-16 NOTE — DISCHARGE INSTRUCTIONS
May use zofran dissolvable tablets for nausea, as prescribed  Return to Emergency department if persistent vomiting, severe pain, or fever >101F

## 2023-01-16 NOTE — ED TRIAGE NOTES
Patient arrives via EMS for nausea/vomiting. She is out of her ODT Zofran prescription at home.

## 2023-01-16 NOTE — ED NOTES
Bed: ED06  Expected date: 1/16/23  Expected time:   Means of arrival:   Comments:  [id number] 23 y/o n/v

## 2023-01-19 ENCOUNTER — HOSPITAL ENCOUNTER (EMERGENCY)
Facility: CLINIC | Age: 23
Discharge: NURSING, REHAB OR LONG-TERM CARE | End: 2023-01-19
Attending: PSYCHIATRY & NEUROLOGY | Admitting: PSYCHIATRY & NEUROLOGY
Payer: MEDICARE

## 2023-01-19 VITALS
SYSTOLIC BLOOD PRESSURE: 126 MMHG | RESPIRATION RATE: 16 BRPM | HEART RATE: 86 BPM | TEMPERATURE: 96.6 F | DIASTOLIC BLOOD PRESSURE: 56 MMHG | OXYGEN SATURATION: 100 %

## 2023-01-19 DIAGNOSIS — F60.3 BORDERLINE PERSONALITY DISORDER (H): ICD-10-CM

## 2023-01-19 DIAGNOSIS — Z11.52 ENCOUNTER FOR SCREENING LABORATORY TESTING FOR SEVERE ACUTE RESPIRATORY SYNDROME CORONAVIRUS 2 (SARS-COV-2): ICD-10-CM

## 2023-01-19 DIAGNOSIS — F79 INTELLECTUAL DISABILITY: ICD-10-CM

## 2023-01-19 LAB — SARS-COV-2 RNA RESP QL NAA+PROBE: NEGATIVE

## 2023-01-19 PROCEDURE — 99285 EMERGENCY DEPT VISIT HI MDM: CPT | Performed by: PSYCHIATRY & NEUROLOGY

## 2023-01-19 PROCEDURE — 90791 PSYCH DIAGNOSTIC EVALUATION: CPT

## 2023-01-19 PROCEDURE — U0003 INFECTIOUS AGENT DETECTION BY NUCLEIC ACID (DNA OR RNA); SEVERE ACUTE RESPIRATORY SYNDROME CORONAVIRUS 2 (SARS-COV-2) (CORONAVIRUS DISEASE [COVID-19]), AMPLIFIED PROBE TECHNIQUE, MAKING USE OF HIGH THROUGHPUT TECHNOLOGIES AS DESCRIBED BY CMS-2020-01-R: HCPCS | Performed by: PSYCHIATRY & NEUROLOGY

## 2023-01-19 PROCEDURE — 99285 EMERGENCY DEPT VISIT HI MDM: CPT | Mod: 25

## 2023-01-19 PROCEDURE — C9803 HOPD COVID-19 SPEC COLLECT: HCPCS

## 2023-01-19 ASSESSMENT — ACTIVITIES OF DAILY LIVING (ADL): ADLS_ACUITY_SCORE: 37

## 2023-01-19 ASSESSMENT — COLUMBIA-SUICIDE SEVERITY RATING SCALE - C-SSRS
TOTAL  NUMBER OF ABORTED OR SELF INTERRUPTED ATTEMPTS SINCE LAST CONTACT: NO
2. HAVE YOU ACTUALLY HAD ANY THOUGHTS OF KILLING YOURSELF?: NO
SUICIDE, SINCE LAST CONTACT: NO
6. HAVE YOU EVER DONE ANYTHING, STARTED TO DO ANYTHING, OR PREPARED TO DO ANYTHING TO END YOUR LIFE?: NO
1. SINCE LAST CONTACT, HAVE YOU WISHED YOU WERE DEAD OR WISHED YOU COULD GO TO SLEEP AND NOT WAKE UP?: YES
5. HAVE YOU STARTED TO WORK OUT OR WORKED OUT THE DETAILS OF HOW TO KILL YOURSELF? DO YOU INTEND TO CARRY OUT THIS PLAN?: NO
TOTAL  NUMBER OF INTERRUPTED ATTEMPTS SINCE LAST CONTACT: NO
ATTEMPT SINCE LAST CONTACT: NO

## 2023-01-20 NOTE — ED TRIAGE NOTES
Triage Assessment     Row Name 01/19/23 1935       Triage Assessment (Adult)    Airway WDL WDL       Respiratory WDL    Respiratory WDL WDL       Skin Circulation/Temperature WDL    Skin Circulation/Temperature WDL WDL       Cardiac WDL    Cardiac WDL WDL       Peripheral/Neurovascular WDL    Peripheral Neurovascular WDL WDL       Cognitive/Neuro/Behavioral WDL    Cognitive/Neuro/Behavioral WDL WDL               Warm/Dry

## 2023-01-20 NOTE — CONSULTS
Diagnostic Evaluation Consultation  Crisis Assessment    Patient was assessed: In Person  Patient location: [facility] ED  Was a release of information signed: No. Reason: no significant changes to share      Referral Data and Chief Complaint  Sadaf is a 22 year old, who uses she/her pronouns, and presents to the ED via EMS. Patient is referred to the ED by self. Patient is presenting to the ED for the following concerns: feeling sad about her dad and having thoughts of harming self.      Informed Consent and Assessment Methods     Patient is her own guardian. Writer met with patient and explained the crisis assessment process, including applicable information disclosures and limits to confidentiality, assessed understanding of the process, and obtained consent to proceed with the assessment. Patient was observed to be able to participate in the assessment as evidenced by verbally willing and able to engage.. Assessment methods included conducting a formal interview with patient, review of medical records, collaboration with medical staff, and obtaining relevant collateral information from family and community providers when available..     Over the course of this crisis assessment provided reassurance, offered validation, engaged patient in problem solving and disposition planning, worked with patient on safety and aftercare planning and worked with patient on brief, therapeutic activity: talking about missing Dad in other ways, like  journaling or drawing a picture.. Patient's response to interventions was to engage, agree with suggestion to journal and possibly send ED letters and then calmly agree she wants to sleep at home.     Summary of Patient Situation  Pt has a long hx of calling 911 from  and seeking secondary gains from attention in the ED.  There is a ED care plan in place.  Dr Sena requested pt be seen today by [facility].   Pt reports that she had [facility] come and talk to her earlier today and they reportedly  told her to come to the [facility].  However, her friend Marcia was coming to visit so pt stayed at home.   Pt enjoyed time visiting with Marcia and said it made her feel 'a little bit' better.  Later she decided to call 911 and come to the ED because she was feeling sad thinking about the loss of her father.  She admits that she didn't want to talk to staff Arlene about it.  Says she did talk to [facility] (positive ) staff earlier and that conversation went 'good'.  Pt admits to having thoughts of hurting herself earlier today, but now is in no apparent distress.  Denies any current intent to self harm. She feels she can try to follow her care plan.  It it valid to note that pt has refrained from significant sib for some time and thus positive reinforcement for being able to use those skills was provided.  Pt denies any other new stressors.  Discussed option for journaling when she is grieving or needing to talk to someone. In addition, proposed an option to mail the  ED staff letters about her progress so she can share with us without actually coming to the ED when not in life threatening crisis.       Brief Psychosocial History  Pt resides in  with 24/7 staff supports.  Continues to be her own guardian.  Has family support. Has multiple support providers    Significant Clinical History     Per recent assessment:  Pt has a hx of multiple ED visits with the most recent being today 12/21/22, 12/19/22, 12/16/22, 12/15/22, and 12/10/22 as well as multiple previous visits prior. Pt has a hx of inpatient hospitalizations with the last one being on 11/21/2021 at [facility].   Pt has hx of dx of Bipolar 1, Depression, and Borderline Personality Disorder. Pt has a PCP Jess Wilkins MD - [facility], psychiatrist Emma Jacobson NP - [facility], and pt states that she is just about to start trauma therapy with Lynne CASTREJON     Collateral Information  Phoned  at [phone]  and spoke with Arlene.  It was confirmed that pt said she  wasn't feeling good, but when staff tried to talk to her about it, she refused.  Then the ambulance showed up.  Per ED care plan:  The following was stated in patient's care plan section:    The pt has become increasingly disruptive in the Emergency Department.  The pt will yell, demand, and scream and disrupt the milieu, and this happens before she finds out the recommendation to send her back to her group home.  Today, the pt postured toward the ED doctor and threatened to  punch him.   During one of her ED visits over the past week, the pt befriended another patient who is waiting for an inpatient bed.  The pt has been texting and contacting this patient.  There is concern that one of the reasons the pt is coming to the ED is to interact with this patient.        An Emergency Department Care Plan is being sought in order to reduce and extinguish the pt s numerous ED visits.  The pt does have an active outpatient team that the pt can utilize for support and lives in a group home with 24/7 staffing.  Typically, the pt calls 911, not group home staff.  Additionally, [facility] have attempted to recommend additionally services, such as day treatment, and the pt refuses the recommendations.  It appears that the pt comes to the ED, requesting admission as she does not like her group home.        To that end, it is recommended that if the pt returns to the Emergency Department, she be triaged and if there is not any acute new symptoms, both medically and mental health-wise, the group home be called and informed that the pt is returning and medical transport be set up for her return.  If the pt becomes dysregulated, the pt should be offered/given appropriate medications.  This should not hinder her return to her group home.        9/27/22, Nuria Deleon [facility]; Dr. Luis De Anda; [facility]     Risk Assessment  [place] Suicide Severity Rating Scale Since Last Contact:   Suicidal Ideation (Since Last Contact)  1.  Wish to be Dead (Since Last Contact): Yes  2. Non-Specific Active Suicidal Thoughts (Since Last Contact): No  3. Active Suicidal Ideation with any Methods (Not Plan) Without Intent to Act (Since Last Contact): No  5. Active Suicidal Ideation with Specific Plan and Intent (Since Last Contact): No  Suicidal Behavior (Since Last Contact)  Actual Attempt (Since Last Contact): No  Has subject engaged in non-suicidal self-injurious behavior? (Since Last Contact): No  Interrupted Attempts (Since Last Contact): No  Aborted or Self-Interrupted Attempt (Since Last Contact): No  Preparatory Acts or Behavior (Since Last Contact): No  Suicide (Since Last Contact): No     C-SSRS Risk (Since Last Contact)  Calculated C-SSRS Risk Score (Since Last Contact): Low Risk    Validity of evaluation is not impacted by presenting factors during interview .   Comments regarding subjective versus objective responses to [place] tool: somewhat dulled affect is baseline, see narrative  Environmental or Psychosocial Events: loss of a loved one, challenging interpersonal relationships and impulsivity/recklessness  Chronic Risk Factors: personality disorders and history of Non-Suicidal Self Injury (NSSI)   Warning Signs: feeling trapped, like there is no way out, engaging in self-destructive behavior and recent discharges from emergency department or inpatient psychiatric care  Protective Factors: lives in a responsibly safe and stable environment, sense of importance of health and wellness, supportive ongoing medical and mental health care relationships and help seeking  Interpretation of Risk Scoring, Risk Mitigation Interventions and Safety Plan:  Agree that pt is at low risk for suicide, ole since she lived in a supportive  environment with safety precautions in place.      Does the patient have thoughts of harming others? No     Is the patient engaging in sexually inappropriate behavior?  no        Current Substance Abuse     Is there  recent substance abuse? no     Was a urine drug screen or blood alcohol level obtained: No       Mental Status Exam     Affect: Appropriate and Other: dulled as common for pt had been resting prior to interview  Appearance: Appropriate and Other: buzz hair cut, wearing warm sweatshirt and sweat pants    Attention Span/Concentration: Attentive  Eye Contact: Variable   Fund of Knowledge: Appropriate and Delayed    Language /Speech Content: Fluent   Language /Speech Volume: Soft and Normal    Language /Speech Rate/Productions: Normal    Recent Memory: Intact   Remote Memory: Variable   Mood: Normal    Orientation to Person: Yes    Orientation to Place: Yes   Orientation to Time of Day: Yes    Orientation to Date: Yes    Situation (Do they understand why they are here?): Yes    Psychomotor Behavior: Normal and Other: getting fatigued as it is late, was lying of cot in hallway prior to assessment    Thought Content: Clear   Thought Form: Intact      History of commitment: No      Medication    Psychotropic medications: no changes in meds, see medical record for details  Medication changes made in the last two weeks: No       Current Care Team      Primary Care Provider: Jess Wilkins MD - [facility]  Psychiatrist: Emma Jacobson NP - [facility]  Therapist: Rd Barton MA, [facility], and is starting trauma therapy with Lynne LUCÍA will.  : Yes   Other: Group home.         Diagnosis    301.83 (F60.3) Borderline Personality Disorder   Intellectual Disabilites  319 (F79) Unspecified Intellectual Disability (Intellectual Developmental Disorder) - by history   296.50 Bipolar I Disorder Current or Most Recent Episode Depressed, unspecified - by history     Clinical Summary and Substantiation of Recommendations    Earlier today pt did talk to [facility] staff and to [facility], then enjoyed visiting with a friend.  Then later she called 911 when sad thinking about father who  2 yrs ago and reported si so she could to be taken  to the ED.  Pt has decreased her sib and reports feeling safe at her , however she repeatedly calls to be sent to the ED without consulting  staff.  Currently denies suicidal ideation, intent or plan, denies HI. Is open to trying additional coping strategies such as  journaling about her dad or writing letters.   staff Arlene agrees that they are able  To have pt return to the  tonight.  Pt agrees she would rather sleep in her own room than here, thus will be discharged home with follow up from existing providers.   Disposition    Recommended disposition: Individual Therapy, Medication Management and Other: Group Home supports       Reviewed case and recommendations with attending provider. Attending Name: Dr Magno Sena      Attending concurs with disposition: Yes       Patient concurs with disposition: Yes       Guardian concurs with disposition: NA      Final disposition: Group home: return home with f/u from existing providers .       Outpatient Details (if applicable):   Aftercare plan and appointments placed in the AVS and provided to patient: Yes. Given to patient by ED nursing Staff    Was lethal means counseling provided as a part of aftercare planning? Yes - describe ; pt agrees that she does not have access to meds, weapons or typical lethal means at the  and this helps her feel safe.       Assessment Details    Patient interview started at: 8:18pm and completed at: 8:40pm.     Total duration spent on the patient case in minutes: 1.0 hrs      CPT code(s) utilized: 37477 - Psychotherapy for Crisis - 60 (30-74*) min       Tori Baxter, LICSW, MSW, LICSW, Psychotherapist  DEC - Triage & Transition Services  Callback: [phone]           Aftercare Plan     If I am feeling unsafe or I am in a crisis, I will:  Contact my established care providers  Call the National Suicide Prevention Lifeline: 980  Go to the nearest emergency room or call 911 only if your  staff thinks it is  necessary    Warning signs that I or other people might notice when a crisis is  developing for me: irritable, get mad over small things, feeliing sad,  lonely, not using resources  Things I am able to do on my own to cope or help me feel better:  use coping strategies, take deep breaths, take a shower, do an art  project, ask for PRN medication.  Things that I am able to do with others to cope or help me better:  talk with staff, care providers, family, go on an outing, take a walk  Things I can use or do for distraction: listen to music, color, draw, take  a shower, talk to a friend  Changes I can make to support my mental health and wellness: take  medications as prescribed, count to 50 before making impulsive  decisions, get enough sleep, exercise  People in my life that I can ask for help: family, group home staff,  therapist, providers  Your [facility] has a mental health crisis team you can call 24/7:  [facility]  [phone]  Other things that are important when I'm in crisis: Slow down, take  several deep breaths, move to a safe place, give phone to staff to hold  until feeling better, ask for help    Additional resources and information:   Follow up with current mental health providers.  When you are feeling the need to talk to the ED staff, write us a letter and  Tell us how you are trying  And using your coping skills.   When you are sad about your dad try to journal about good memories or draw a picture of you two fishing.        The following DBT skills can  assist me when: I want to act on your emotions and acting on them  will only make things worse, I am overwhelmed by my emotions, I  want to try to be skillful and not act on self destructive behavior.  Reduce Extreme Emotion QUICKLY: Changing Your Body Chemistry  T: Change your body Temperature to change your autonomic nervous system  Use Ice pack to calm yourself down FAST. Place ice pack underneath your eyes for a count of 30  seconds to initiate the  divers reflex which will naturally calm down your heart rate and breathing.  I: Intensely exercise to calm down a body revved up by emotion  Examples: running, walking fast, jumping, playing basketball, weight lifting, swimming, calisthenics, etc.  Engage in exercises that DO NOT include violent behaviors. Exercises that utilize violent behaviors tend to function as   behavioral rehearsal,  and rather than calming the person down, may actually  rev  the person up more, increasing the  likelihood of violence, and lessening the likelihood that they will  burn off  energy  P: Progressively relax your muscles  Starting with your hands, moving to your forearms, upper arms, shoulders, neck, forehead, eyes, cheeks and lips,  tongue and teeth, chest, upper back, stomach, buttocks, thighs, calves, ankles, feet  Tense (10 seconds, Â  of the way), then relax each muscle (all the way)  Notice the tension  Notice the difference when relaxed (by tensing first, and then relaxing, you are able to get a more thorough  relaxation than by simply relaxing)  P: Paced breathing to relax  The standard technique is to begin with counting the number of steps one takes for a typical inhale, then counting the  steps one takes for a typical exhale, and then lengthening the amount of steps for the exhalation by one or two steps.  OR repeat this pattern for 1-2 minutes:  Inhale for four (4) seconds  Exhale for six (6) to eight (8) seconds  After using Distress Tolerance TIPP, TRY TO STOP!  S- Stop  Do not just react on your emotion urge. Stop! Freeze! Do not move a muscle! Your emotions may try to make you act  without thinking. Stay in control! Take a step back Take a step back from the situation.  T- Take a break  Let go. Take a deep breath. Do not let your feelings make you act impulsively.  O- Observe  Notice what is going on inside and outside you. What is the situation? What are your thoughts and feelings? What  are  others saying or doing? Does my emotion make sense, is it justified? What is it that my emotions want me to do?  Would that be effective?  P- Proceed mindfully  Act with awareness. In deciding what to do, consider your thoughts and feelings, the situation, and other people s  thoughts and feelings. Think about your goals. Ask Wise Mind: Which actions will make it better or worse?  If my emotion action urge would not be effective or helpful, practice acting OPPOSITE to the EMOTION ACTION  URGE can help reduce the intensity or even change the emotion.  Consider these examples: with FEAR we have the urge to run away/avoid. OPPOSITE would be to approach it with  caution. ANGER we have the urge to attack. OPPOSITE would be to gently avoid or to demonstrate kindness towards it.  SADNESS we have the urge to withdraw/isolate. OPPOSITE would be to get self to move and be active physically or  Socially.    These additional skills may help with self-soothing and distracting you:  Activities  Focus attention on a task you need to get done. Rent movies; watch TV. Clean a room in your house. Find an event to  go to. Play computer games. Go walking. Exercise. Surf the Internet. Write e-mails. Play sports. Go out for a meal or eat  a favorite food. Call or go out with a friend. Listen to your iPod; download music. Build something. Spend time with your  children. Play cards. Read magazines, books, comics. Do crossword puzzles or Sudoku.  Emotions  Read emotional books or stories, old letters. Watch emotional TV shows; go to emotional movies. Listen to emotional  music. (Be sure the event creates different emotions.) Ideas: Scary movies, joke books, comedies, funny records,  Christian music, soothing music or music that fires you up, going to a store and reading funny greeting cards.  Thoughts  Count to 10; count colors in a painting or poster or out the window; count anything. Repeat words to a song in your  mind. Work puzzles.  Watch TV or read.  Sensations  Squeeze a rubber ball very hard. Listen to very loud music. Hold ice in your hand or mouth. Go out in the rain or snow.  Take a hot or cold shower.  Remember that you can use your 5 senses as helpful self-soothing tools!  I can help my own emotions by practicing the following to keep my emotional mind healthy and bring positive  emotions:  The ABC PLEASE skill is about taking good care of ourselves so that we can take care of others. Also, an important  component of DBT is to reduce our vulnerability. When we take good care of ourselves, we are less likely to be  vulnerable to disease and emotional crisis.  ABC  A- Accumulate positive emotions by doing things that are pleasant.  B- Build mastery by doing things we enjoy. Whether it is reading, cooking, cleaning, fixing a car, working a cross word  puzzle, or playing a musical instrument. Practice these things to  and in time we feel competent.  C- [place] Ahead by rehearsing a plan ahead of time so that we can be prepared to cope skillfully. (Think of what makes  situations difficult, and what helps in those situations)  PLEASE  Treat Physical Illness and take medications as prescribed.  Balance eating in order to avoid mood swings.  Avoid mood-Altering substances and have mood control.  Maintain good sleep so you can enjoy your life.  Get exercise to maintain high spirits.      Crisis Lines  Crisis Text Line  Text 698614  You will be connected with a trained live crisis counselor to provide support.    Por major, texto  SIRENA a 217946 o texto a 442-AYUDAME en WhatsApp    The Mikey Project (LGBTQ Youth Crisis Line)  0.068.449.5584  text START to 162-624      Community Resources  Fast Tracker  Linking people to mental health and substance use disorder resources  fasttrackermn.org     [place] Mental Health Warm Line  Peer to peer support  Monday thru Saturday, 12 pm to 10 pm  930.374.6127 or 1.268.711.8260  Text  "\"Support\" to 20304    National [place] on Mental Illness (MAGY)  407.479.5743 or 1.888.MAGY.HELPS      Mental Health Apps  My3  https://Apiary.org/    VirtualHopeBox  https://Pharmacy Development/apps/virtual-hope-box/      Additional Information  Today you were seen by a licensed mental health professional through Triage and Transition services, Behavioral Healthcare Providers ([facility])  for a crisis assessment in the Emergency Department at [facility].  It is recommended that you follow up with your established providers (psychiatrist, mental health therapist, and/or primary care doctor - as relevant) as soon as possible. Coordinators from [facility] will be calling you in the next 24-48 hours to ensure that you have the resources you need.  You can also contact [facility] coordinators directly at [phone]. You may have been scheduled for or offered an appointment with a mental health provider. [facility] maintains an extensive network of licensed behavioral health providers to connect patients with the services they need.  We do not charge providers a fee to participate in our referral network.  We match patients with providers based on a patient's specific needs, insurance coverage, and location.  Our first effort will be to refer you to a provider within your care system, and will utilize providers outside your care system as needed.          "

## 2023-01-20 NOTE — ED PROVIDER NOTES
ED Provider Note  [facility]      History     Chief Complaint   Patient presents with     Suicidal     Anniversary of dad's death soon, endorsing SI without plan and SIB, denies pain and HI.      HPI  Sadaf Ross is a 22 year old female who is here via EMS from her group home as she reports feeling sad and depressed and suicidal as she misses her father and his death anniversary in coming up. Patient is well-known to us due to her frequent ED visits to meet her needs. She has limited coping skills and does not appear to readily use her outpatient supportive services. She had seen her provider yesterday at [facility] and was encouraged to use the positive  (PSS). She was not feeling suicidal yesterday during her visit nor earlier today when she talked to her PSS.     Patient here is lying in her recliner chair. She appears calm and in emotional and behavioral control. There is no psychosis.    Past Medical History  Past Medical History:   Diagnosis Date     ADHD (attention deficit hyperactivity disorder)      Bipolar 1 disorder (H)      Borderline personality disorder (H)      Depression      Depressive disorder      Intellectual disability      Obesity      Syncope      Past Surgical History:   Procedure Laterality Date     APPENDECTOMY       APPENDECTOMY       acetaminophen (TYLENOL) 325 MG tablet  alum & mag hydroxide-simethicone (MAALOX  ES) 400-400-40 MG/5ML SUSP suspension  ARIPiprazole lauroxil ER (ARISTADA) 882 MG/3.2ML intra-muscular  benzonatate (TESSALON) 200 MG capsule  benztropine (COGENTIN) 1 MG tablet  calcium carbonate (TUMS) 500 MG chewable tablet  clobetasol (TEMOVATE) 0.05 % CREA cream  cyclobenzaprine (FLEXERIL) 10 MG tablet  diclofenac (VOLTAREN) 1 % topical gel  docusate sodium (COLACE) 50 MG capsule  fluticasone (FLONASE) 50 MCG/ACT nasal spray  guaiFENesin (MUCINEX) 600 MG 12 hr tablet  hydrocortisone (CORTAID) 0.5 % external cream  hydrOXYzine  (ATARAX) 50 MG tablet  hyoscyamine (LEVSIN/SL) 0.125 MG sublingual tablet  ibuprofen (ADVIL/MOTRIN) 400 MG tablet  loperamide (IMODIUM) 2 MG capsule  LORazepam (ATIVAN) 0.5 MG tablet  multivitamin, therapeutic (THERA-VIT) TABS tablet  OLANZapine zydis (ZYPREXA) 5 MG ODT  omeprazole (PRILOSEC) 40 MG DR capsule  ondansetron (ZOFRAN ODT) 4 MG ODT tab  ondansetron (ZOFRAN) 4 MG tablet  polyethylene glycol (MIRALAX) 17 GM/Dose powder  prochlorperazine (COMPAZINE) 10 MG tablet  promethazine (PHENERGAN) 12.5 MG tablet  sennosides (SENOKOT) 8.6 MG tablet  traZODone (DESYREL) 50 MG tablet  venlafaxine (EFFEXOR-ER) 225 MG 24 hr tablet  Vitamin D, Cholecalciferol, 25 MCG (1000 UT) TABS      Allergies   Allergen Reactions     Penicillins Rash and Unknown     Family History  Family History   Problem Relation Age of Onset     Diabetes Type 1 Father      Cancer Paternal Grandfather      Social History   Social History     Tobacco Use     Smoking status: Every Day     Packs/day: 1.00     Years: 5.00     Pack years: 5.00     Types: Vaping Device, Cigarettes     Smokeless tobacco: Never   Substance Use Topics     Alcohol use: No     Drug use: No         A complete review of systems was performed with pertinent positives and negatives noted in the HPI, and all other systems negative.    Physical Exam   BP: 126/56  Pulse: 86  Temp: (!) 96.6  F (35.9  C)  Resp: 16  SpO2: 100 %  Physical Exam  Vitals and nursing note reviewed.   HENT:      Head: Normocephalic.   Eyes:      Pupils: Pupils are equal, round, and reactive to light.   Pulmonary:      Effort: Pulmonary effort is normal.   Musculoskeletal:         General: Normal range of motion.      Cervical back: Normal range of motion.   Neurological:      General: No focal deficit present.      Mental Status: She is alert.   Psychiatric:         Attention and Perception: Attention and perception normal. She does not perceive auditory or visual hallucinations.         Mood and Affect: Mood  and affect normal.         Speech: Speech normal.         Behavior: Behavior is withdrawn. Behavior is not agitated, aggressive, hyperactive or combative. Behavior is cooperative.         Thought Content: Thought content normal. Thought content is not paranoid or delusional. Thought content does not include homicidal or suicidal ideation.         Cognition and Memory: Cognition and memory normal.         Judgment: Judgment normal.         ED Course, Procedures, & Data      Procedures       Mental Health Risk Assessment      PSS-3    Date and Time Over the past 2 weeks have you felt down, depressed, or hopeless? Over the past 2 weeks have you had thoughts of killing yourself? Have you ever attempted to kill yourself? When did this last happen? User   01/19/23 1935 yes yes yes -- KRB      C-SSRS ([place])    Date and Time Q1 Wished to be Dead (Past Month) Q2 Suicidal Thoughts (Past Month) Q3 Suicidal Thought Method Q4 Suicidal Intent without Specific Plan Q5 Suicide Intent with Specific Plan Q6 Suicide Behavior (Lifetime) Within the Past 3 Months? RETIRED: Level of Risk per Screen Screening Not Complete User   01/19/23 1935 yes yes no yes no yes -- -- -- KRB              Suicide assessment completed by mental health (D.E.C., LCSW, etc.)       Results for orders placed or performed during the hospital encounter of 01/19/23   Asymptomatic COVID-19 Virus (Coronavirus) by PCR Nasopharyngeal     Status: Normal    Specimen: Nasopharyngeal; Swab   Result Value Ref Range    SARS CoV2 PCR Negative Negative    Narrative    Testing was performed using the Xpert Xpress SARS-CoV-2 Assay on the Cepheid Gene-Xpert Instrument Systems. Additional information about this Emergency Use Authorization (EUA) assay can be found via the Lab Guide. This test should be ordered for the detection of SARS-CoV-2 in individuals who meet SARS-CoV-2 clinical and/or epidemiological criteria as well as from individuals without symptoms or other reasons  to suspect COVID-19. Test performance for asymptomatic patients has only been established in anterior nasal swab specimens. This test is for in vitro diagnostic use under the FDA EUA for laboratories certified under CLIA to perform high complexity testing. This test has not been FDA cleared or approved. A negative result does not rule out the presence of PCR inhibitors in the specimen or target RNA concentration below the limit of detection for the assay. The possibility of a false negative should be considered if the patient's recent exposure or clinical presentation suggests COVID-19. This test was validated by the [facility]. This laboratory is certified under the Clinical Laboratory Improvement Amendments (CLIA) as qualified to perform high complexity laboratory testing.       Medications - No data to display  Labs Ordered and Resulted from Time of ED Arrival to Time of ED Departure   COVID-19 VIRUS (CORONAVIRUS) BY PCR - Normal       Result Value    SARS CoV2 PCR Negative       No orders to display          Medical Decision Making  The patient presented with a problem that is a stable chronic illness.    The patient's evaluation involved:  history and exam without other MDM data elements    The patient's management involved a decision regarding hospitalization.      Assessment & Plan    Patient is here for a mental health and safety assessment. She is well-known to us due to her multiple ED visits. She has borderline personality disorder and intellectual disability and recently had seen her outpatient providers with no inidication for safety concerns nor feeling suicidal. Patient comes in tonight as she felt suicidal. She appears at baseline and there is no acute safety concern needing urgent intervention.Patient can be transported back to her group home. She will be transported by ambulance. She is encouraged to follow-up established care and services.    I have reviewed  the nursing notes. I have reviewed the findings, diagnosis, plan and need for follow up with the patient.    New Prescriptions    No medications on file       Final diagnoses:   Borderline personality disorder (H)   Intellectual disability       Magno Sena MD  [facility] EMERGENCY DEPARTMENT  1/19/2023     Magno Sena MD  01/19/23 7127

## 2023-01-20 NOTE — DISCHARGE INSTRUCTIONS
Aftercare Plan     If I am feeling unsafe or I am in a crisis, I will:  Contact my established care providers  Call the National Suicide Prevention Lifeline: 988  Go to the nearest emergency room or call 911 only if your  staff thinks it is necessary    Warning signs that I or other people might notice when a crisis is  developing for me: irritable, get mad over small things, feeliing sad,  lonely, not using resources  Things I am able to do on my own to cope or help me feel better:  use coping strategies, take deep breaths, take a shower, do an art  project, ask for PRN medication.  Things that I am able to do with others to cope or help me better:  talk with staff, care providers, family, go on an outing, take a walk  Things I can use or do for distraction: listen to music, color, draw, take  a shower, talk to a friend  Changes I can make to support my mental health and wellness: take  medications as prescribed, count to 50 before making impulsive  decisions, get enough sleep, exercise  People in my life that I can ask for help: family, group home staff,  therapist, providers  Your [facility] has a mental health crisis team you can call 24/7:  [facility] Mobile Crisis  [phone]  Other things that are important when I'm in crisis: Slow down, take  several deep breaths, move to a safe place, give phone to staff to hold  until feeling better, ask for help    Additional resources and information:   Follow up with current mental health providers.  When you are feeling the need to talk to the ED staff, write us a letter and  Tell us how you are trying  And using your coping skills.   When you are sad about your dad try to journal about good memories or draw a picture of you two fishing.        The following DBT skills can  assist me when: I want to act on your emotions and acting on them  will only make things worse, I am overwhelmed by my emotions, I  want to try to be skillful and not act on self destructive  behavior.  Reduce Extreme Emotion QUICKLY: Changing Your Body Chemistry  T: Change your body Temperature to change your autonomic nervous system  Use Ice pack to calm yourself down FAST. Place ice pack underneath your eyes for a count of 30 seconds to initiate the  divers reflex which will naturally calm down your heart rate and breathing.  I: Intensely exercise to calm down a body revved up by emotion  Examples: running, walking fast, jumping, playing basketball, weight lifting, swimming, calisthenics, etc.  Engage in exercises that DO NOT include violent behaviors. Exercises that utilize violent behaviors tend to function as   behavioral rehearsal,  and rather than calming the person down, may actually  rev  the person up more, increasing the  likelihood of violence, and lessening the likelihood that they will  burn off  energy  P: Progressively relax your muscles  Starting with your hands, moving to your forearms, upper arms, shoulders, neck, forehead, eyes, cheeks and lips,  tongue and teeth, chest, upper back, stomach, buttocks, thighs, calves, ankles, feet  Tense (10 seconds, Â  of the way), then relax each muscle (all the way)  Notice the tension  Notice the difference when relaxed (by tensing first, and then relaxing, you are able to get a more thorough  relaxation than by simply relaxing)  P: Paced breathing to relax  The standard technique is to begin with counting the number of steps one takes for a typical inhale, then counting the  steps one takes for a typical exhale, and then lengthening the amount of steps for the exhalation by one or two steps.  OR repeat this pattern for 1-2 minutes:  Inhale for four (4) seconds  Exhale for six (6) to eight (8) seconds  After using Distress Tolerance TIPP, TRY TO STOP!  S- Stop  Do not just react on your emotion urge. Stop! Freeze! Do not move a muscle! Your emotions may try to make you act  without thinking. Stay in control! Take a step back Take a step back from  the situation.  T- Take a break  Let go. Take a deep breath. Do not let your feelings make you act impulsively.  O- Observe  Notice what is going on inside and outside you. What is the situation? What are your thoughts and feelings? What are  others saying or doing? Does my emotion make sense, is it justified? What is it that my emotions want me to do?  Would that be effective?  P- Proceed mindfully  Act with awareness. In deciding what to do, consider your thoughts and feelings, the situation, and other people s  thoughts and feelings. Think about your goals. Ask Wise Mind: Which actions will make it better or worse?  If my emotion action urge would not be effective or helpful, practice acting OPPOSITE to the EMOTION ACTION  URGE can help reduce the intensity or even change the emotion.  Consider these examples: with FEAR we have the urge to run away/avoid. OPPOSITE would be to approach it with  caution. ANGER we have the urge to attack. OPPOSITE would be to gently avoid or to demonstrate kindness towards it.  SADNESS we have the urge to withdraw/isolate. OPPOSITE would be to get self to move and be active physically or  Socially.    These additional skills may help with self-soothing and distracting you:  Activities  Focus attention on a task you need to get done. Rent movies; watch TV. Clean a room in your house. Find an event to  go to. Play computer games. Go walking. Exercise. Surf the Internet. Write e-mails. Play sports. Go out for a meal or eat  a favorite food. Call or go out with a friend. Listen to your iPod; download music. Build something. Spend time with your  children. Play cards. Read magazines, books, comics. Do crossword puzzles or Sudoku.  Emotions  Read emotional books or stories, old letters. Watch emotional TV shows; go to emotional movies. Listen to emotional  music. (Be sure the event creates different emotions.) Ideas: Scary movies, joke books, comedies, funny records,  Episcopal music,  soothing music or music that fires you up, going to a store and reading funny greeting cards.  Thoughts  Count to 10; count colors in a painting or poster or out the window; count anything. Repeat words to a song in your  mind. Work puzzles. Watch TV or read.  Sensations  Squeeze a rubber ball very hard. Listen to very loud music. Hold ice in your hand or mouth. Go out in the rain or snow.  Take a hot or cold shower.  Remember that you can use your 5 senses as helpful self-soothing tools!  I can help my own emotions by practicing the following to keep my emotional mind healthy and bring positive  emotions:  The ABC PLEASE skill is about taking good care of ourselves so that we can take care of others. Also, an important  component of DBT is to reduce our vulnerability. When we take good care of ourselves, we are less likely to be  vulnerable to disease and emotional crisis.  ABC  A- Accumulate positive emotions by doing things that are pleasant.  B- Build mastery by doing things we enjoy. Whether it is reading, cooking, cleaning, fixing a car, working a cross word  puzzle, or playing a musical instrument. Practice these things to  and in time we feel competent.  C- [place] A[place] by rehearsing a plan ahead of time so that we can be prepared to cope skillfully. (Think of what makes  situations difficult, and what helps in those situations)  PLEASE  Treat Physical Illness and take medications as prescribed.  Balance eating in order to avoid mood swings.  Avoid mood-Altering substances and have mood control.  Maintain good sleep so you can enjoy your life.  Get exercise to maintain high spirits.      Crisis Lines  Crisis Text Line  Text 665959  You will be connected with a trained live crisis counselor to provide support.    Por espanol, texto  SIRENA a 332309 o texto a 442-AYUDAME en WhatsApp    The Mi[employer] (LGBTQ Youth Crisis Line)  3.943.829.2434  text START to 434-084      [facility] Pedius  Fast  "Tracker  Linking people to mental health and substance use disorder resources  WHILL.Sobrr     [place] Mental Health Warm Line  Peer to peer support  Monday thru Saturday, 12 pm to 10 pm  680.839.1285 or 0.000.310.0130  Text \"Support\" to 85326    National [place] on Mental Illness (MAGY)  941.508.0779 or 1.888.MAGY.HELPS      Mental Health Apps  My3  https://Packback.org/    VirtualHopeBox  https://Beryllium/apps/virtual-hope-box/      Additional Information  Today you were seen by a licensed mental health professional through Triage and Transition services, Behavioral Healthcare Providers ([facility])  for a crisis assessment in the Emergency Department at [facility].  It is recommended that you follow up with your established providers (psychiatrist, mental health therapist, and/or primary care doctor - as relevant) as soon as possible. Coordinators from [facility] will be calling you in the next 24-48 hours to ensure that you have the resources you need.  You can also contact [facility] coordinators directly at [phone]. You may have been scheduled for or offered an appointment with a mental health provider. [facility] maintains an extensive network of licensed behavioral health providers to connect patients with the services they need.  We do not charge providers a fee to participate in our referral network.  We match patients with providers based on a patient's specific needs, insurance coverage, and location.  Our first effort will be to refer you to a provider within your care system, and will utilize providers outside your care system as needed.    "

## 2023-01-20 NOTE — ED NOTES
[facility] here to transport pt back to the group home. Per EMS pt is c/o stomach pain - Dr Sena updated, per MD he will come see pt.    Group home staff Arlene [phone] has been called and updated that pt is being sent back to the group home. Per Arlene staffs are waiting for the patient and writer does not need to call again.

## 2023-01-25 ENCOUNTER — HOSPITAL ENCOUNTER (EMERGENCY)
Facility: CLINIC | Age: 23
Discharge: NURSING, REHAB OR LONG-TERM CARE | End: 2023-01-25
Attending: EMERGENCY MEDICINE | Admitting: EMERGENCY MEDICINE
Payer: MEDICARE

## 2023-01-25 VITALS
SYSTOLIC BLOOD PRESSURE: 144 MMHG | RESPIRATION RATE: 18 BRPM | OXYGEN SATURATION: 98 % | HEART RATE: 91 BPM | TEMPERATURE: 99 F | DIASTOLIC BLOOD PRESSURE: 82 MMHG

## 2023-01-25 DIAGNOSIS — F60.3 BORDERLINE PERSONALITY DISORDER (H): ICD-10-CM

## 2023-01-25 DIAGNOSIS — F79 INTELLECTUAL DISABILITY: ICD-10-CM

## 2023-01-25 PROCEDURE — 99282 EMERGENCY DEPT VISIT SF MDM: CPT | Performed by: EMERGENCY MEDICINE

## 2023-01-25 PROCEDURE — 99283 EMERGENCY DEPT VISIT LOW MDM: CPT | Performed by: EMERGENCY MEDICINE

## 2023-01-25 RX ORDER — OLANZAPINE 10 MG/1
10 TABLET, ORALLY DISINTEGRATING ORAL ONCE
Status: DISCONTINUED | OUTPATIENT
Start: 2023-01-25 | End: 2023-01-25 | Stop reason: HOSPADM

## 2023-01-25 ASSESSMENT — ACTIVITIES OF DAILY LIVING (ADL)
ADLS_ACUITY_SCORE: 37
ADLS_ACUITY_SCORE: 37

## 2023-01-25 NOTE — ED PROVIDER NOTES
ED Provider Note  [facility]      History     Chief Complaint   Patient presents with     Suicidal     Suicidal called dispatch.      HPI  Sadaf Ross is a 22 year old female with bpd and intellectual disability who presents to the ED via ambulance.  She is not talking to staff.  Her group home staff says she was her normal self today and then decided she wanted to go to the ER.  Group home staff says the patient knows what to say so that she is brought to the ED.  She says she is feeling suicidal.  Group home staff again says she knows to say this so she can be brought to the ED.  The group home staff hasn't noticed any changes with the patient.  No sleep or appetite changes.  She says the patient woke up fine but then started planning on going to the ER today.  She was seen here this past week and was stating she was missing her father who passed.  Apparently the anniversary of his death is coming up.  She has services through [facility] and positive  (PSS).        Past Medical and Surgical History, Medications, Allergies, and Social History were reviewed in the electronic medical record. Review with the patient was attempted but limited due to patient does not want to participate in interview.      A complete review of systems was attempted but limited due to patient does not want to participate in interview.    Physical Exam   BP: (!) 144/82  Pulse: 91  Temp: 99  F (37.2  C)  Resp: 18  SpO2: 98 %  Physical Exam  Vitals and nursing note reviewed.   Constitutional:       Appearance: She is obese.      Comments: Looks at floor and won't talk to provider. When not being observed patient is calm and looking around the ER watching others. Appears relaxed.    HENT:      Head: Normocephalic and atraumatic.      Nose: No congestion or rhinorrhea.   Eyes:      Extraocular Movements: Extraocular movements intact.   Cardiovascular:      Rate and Rhythm: Normal rate.    Pulmonary:      Effort: Pulmonary effort is normal.   Musculoskeletal:         General: No deformity or signs of injury.      Cervical back: Normal range of motion.   Skin:     Coloration: Skin is not jaundiced or pale.   Neurological:      General: No focal deficit present.      Mental Status: She is alert.   Psychiatric:         Attention and Perception: Attention and perception normal.         Mood and Affect: Mood normal. Affect is flat.         Speech: She is noncommunicative.         Behavior: Behavior is uncooperative and withdrawn.         Thought Content: Thought content includes suicidal (chronic) ideation.         Cognition and Memory: Cognition is impaired.         Judgment: Judgment is impulsive and inappropriate.           ED Course, Procedures, & Data      Procedures         Mental Health Risk Assessment      PSS-3    Date and Time Over the past 2 weeks have you felt down, depressed, or hopeless? Over the past 2 weeks have you had thoughts of killing yourself? Have you ever attempted to kill yourself? When did this last happen? User   01/25/23 1329 yes yes yes more than 6 months ago [facility]      C-SSRS ([place])    Date and Time Q1 Wished to be Dead (Past Month) Q2 Suicidal Thoughts (Past Month) Q3 Suicidal Thought Method Q4 Suicidal Intent without Specific Plan Q5 Suicide Intent with Specific Plan Q6 Suicide Behavior (Lifetime) Within the Past 3 Months? RETIRED: Level of Risk per Screen Screening Not Complete User   01/25/23 1344 yes yes yes yes no yes -- -- -- [facility]   01/25/23 1329 yes yes no yes no yes -- -- -- [facility]                Item Assessment   Suicidal Ideation Suicidal thoughts   Plan On and off/  Chronic.    Intent none   Suicidal or self-harm behaviors none   Risk Factors Previous psychiatric diagnosis and treatments   Protective Factors Supportive social network of family and/or friends              No results found for any visits on 01/25/23.  Medications   OLANZapine zydis (zyPREXA) ODT tab  "10 mg (has no administration in time range)     Labs Ordered and Resulted from Time of ED Arrival to Time of ED Departure - No data to display  No orders to display          Medical Decision Making  The patient's presentation is strongly suggestive of a stable chronic illness.    The patient's evaluation involved:  an assessment requiring an independent historian (see separate area of note for details)  Review or prior records 3plus.   The patient's management involved a decision regarding hospitalization.      Assessment & Plan    The patient has intellectual disability and BPD and frequently comes to the ER.  She is well known to ED staff.    Per group home staff patient was at her baseline today but then said she wanted to come to the ER.  Group home staff says the patient knows what to say so she can come here.  She says the patient makes jokes that she is the \"er \" and has a job in the ER.  The patient presents at baseline.  No acute changes to warrant admission.  Group home staff is comfortable with patient returning (via ambulance).      I have reviewed the nursing notes. I have reviewed the findings, diagnosis, plan and need for follow up with the patient.    New Prescriptions    No medications on file       Final diagnoses:   Borderline personality disorder (H)   Intellectual disability       Ashely Toth MD  [facility] EMERGENCY DEPARTMENT  1/25/2023     Ashely Toth MD  01/25/23 9789    "

## 2023-01-25 NOTE — DISCHARGE INSTRUCTIONS
Discharge back to patient's group home via ambulance.     Continue working with your current providers.     Continue taking your medications as prescribed.

## 2023-01-30 ENCOUNTER — NURSE TRIAGE (OUTPATIENT)
Dept: NURSING | Facility: CLINIC | Age: 23
End: 2023-01-30
Payer: MEDICARE

## 2023-01-30 ENCOUNTER — HOSPITAL ENCOUNTER (EMERGENCY)
Facility: CLINIC | Age: 23
Discharge: HOME | End: 2023-01-30
Attending: EMERGENCY MEDICINE | Admitting: EMERGENCY MEDICINE
Payer: MEDICARE

## 2023-01-30 VITALS
RESPIRATION RATE: 16 BRPM | OXYGEN SATURATION: 100 % | TEMPERATURE: 98.3 F | HEART RATE: 99 BPM | SYSTOLIC BLOOD PRESSURE: 138 MMHG | DIASTOLIC BLOOD PRESSURE: 89 MMHG

## 2023-01-30 DIAGNOSIS — F99 CHRONIC MENTAL ILLNESS: ICD-10-CM

## 2023-01-30 PROCEDURE — 99285 EMERGENCY DEPT VISIT HI MDM: CPT | Mod: 25 | Performed by: EMERGENCY MEDICINE

## 2023-01-30 PROCEDURE — 250N000013 HC RX MED GY IP 250 OP 250 PS 637: Performed by: EMERGENCY MEDICINE

## 2023-01-30 PROCEDURE — 99284 EMERGENCY DEPT VISIT MOD MDM: CPT | Performed by: EMERGENCY MEDICINE

## 2023-01-30 PROCEDURE — 90791 PSYCH DIAGNOSTIC EVALUATION: CPT

## 2023-01-30 RX ORDER — ACETAMINOPHEN 325 MG/1
650 TABLET ORAL ONCE
Status: COMPLETED | OUTPATIENT
Start: 2023-01-30 | End: 2023-01-30

## 2023-01-30 RX ORDER — OLANZAPINE 5 MG/1
5 TABLET, ORALLY DISINTEGRATING ORAL ONCE
Status: COMPLETED | OUTPATIENT
Start: 2023-01-30 | End: 2023-01-30

## 2023-01-30 RX ADMIN — OLANZAPINE 5 MG: 5 TABLET, ORALLY DISINTEGRATING ORAL at 18:38

## 2023-01-30 RX ADMIN — ACETAMINOPHEN 650 MG: 325 TABLET ORAL at 18:38

## 2023-01-30 ASSESSMENT — COLUMBIA-SUICIDE SEVERITY RATING SCALE - C-SSRS
SUICIDE, SINCE LAST CONTACT: NO
6. HAVE YOU EVER DONE ANYTHING, STARTED TO DO ANYTHING, OR PREPARED TO DO ANYTHING TO END YOUR LIFE?: NO
TOTAL  NUMBER OF ABORTED OR SELF INTERRUPTED ATTEMPTS SINCE LAST CONTACT: NO
TOTAL  NUMBER OF INTERRUPTED ATTEMPTS SINCE LAST CONTACT: NO
2. HAVE YOU ACTUALLY HAD ANY THOUGHTS OF KILLING YOURSELF?: YES
REASONS FOR IDEATION SINCE LAST CONTACT: EQUALLY TO GET ATTENTION, REVENGE, OR A REACTION FROM OTHERS AND TO END/STOP THE PAIN
1. SINCE LAST CONTACT, HAVE YOU WISHED YOU WERE DEAD OR WISHED YOU COULD GO TO SLEEP AND NOT WAKE UP?: YES
ATTEMPT SINCE LAST CONTACT: NO
5. HAVE YOU STARTED TO WORK OUT OR WORKED OUT THE DETAILS OF HOW TO KILL YOURSELF? DO YOU INTEND TO CARRY OUT THIS PLAN?: YES

## 2023-01-30 ASSESSMENT — ACTIVITIES OF DAILY LIVING (ADL): ADLS_ACUITY_SCORE: 37

## 2023-01-30 NOTE — ED NOTES
Pt called EMS herself due to feeling suicidal. Pt states it is close to her dads anniversary of death. Pt started hitting the back of her head against the wall. Pt is complaining of a headache, nausea and dizziness. Pt is alert and orientated. Pt refused BS check en route. Vitals WNL en route.

## 2023-01-30 NOTE — TELEPHONE ENCOUNTER
"Pt reports she is suicidal and has been hitting her head harder and harder, she states her head is not bleeding yet \"wish there was blood coming out\".     Writer advised pt to hang up and dial 911.    Pt verbalizes understanding and agrees to plan.     Reason for Disposition    Violent behavior, or threatening to physically hurt or kill someone    Protocols used: SUICIDE [id number]      "

## 2023-01-31 NOTE — ED PROVIDER NOTES
"ED Provider Note  [facility]      History     Chief Complaint   Patient presents with     Suicidal     Hit head on the wall 3 times     HPI  Sadaf Ross is a 22 year old female with past medical history of intellectual disability, borderline personality disorder, bipolar affective disorder, obesity, suicidal ideation who presents emergency department for complaint of suicidality.  She resides in a group home, and today she mentioned that she is feeling suicidal, which she does have a history of reporting chronically.  She states that it is close to the anniversary of her father's death.      When asked if she has a suicidal plan, she does not verbalize having 1.  She states that she wants to hurt other people, does not have a specific plan or specific person that she would like to hurt.  She has been taking her medications regularly, she noted taking anxiety medications prior to leaving the group home however she still feels anxious right now.  She denies any auditory or visual hallucinations.  She denies any access to weapons.    At the group home she hit the back of her head 3 times against a normal wall.  She notes that she has pain, \"throughout her body.\"  There was no fall or loss of consciousness.  She denies any new numbness, tingling, weakness, nausea, vomiting, vision changes, headache, neck pain.  She is not anticoagulated.        Past Medical History  Past Medical History:   Diagnosis Date     ADHD (attention deficit hyperactivity disorder)      Bipolar 1 disorder (H)      Borderline personality disorder (H)      Depression      Depressive disorder      Intellectual disability      Obesity      Syncope      Past Surgical History:   Procedure Laterality Date     APPENDECTOMY       APPENDECTOMY       acetaminophen (TYLENOL) 325 MG tablet  alum & mag hydroxide-simethicone (MAALOX  ES) 400-400-40 MG/5ML SUSP suspension  ARIPiprazole lauroxil ER (ARISTADA) 882 MG/3.2ML " intra-muscular  benzonatate (TESSALON) 200 MG capsule  benztropine (COGENTIN) 1 MG tablet  calcium carbonate (TUMS) 500 MG chewable tablet  clobetasol (TEMOVATE) 0.05 % CREA cream  cyclobenzaprine (FLEXERIL) 10 MG tablet  diclofenac (VOLTAREN) 1 % topical gel  docusate sodium (COLACE) 50 MG capsule  fluticasone (FLONASE) 50 MCG/ACT nasal spray  guaiFENesin (MUCINEX) 600 MG 12 hr tablet  hydrocortisone (CORTAID) 0.5 % external cream  hydrOXYzine (ATARAX) 50 MG tablet  hyoscyamine (LEVSIN/SL) 0.125 MG sublingual tablet  ibuprofen (ADVIL/MOTRIN) 400 MG tablet  loperamide (IMODIUM) 2 MG capsule  LORazepam (ATIVAN) 0.5 MG tablet  multivitamin, therapeutic (THERA-VIT) TABS tablet  OLANZapine zydis (ZYPREXA) 5 MG ODT  omeprazole (PRILOSEC) 40 MG DR capsule  ondansetron (ZOFRAN) 4 MG tablet  polyethylene glycol (MIRALAX) 17 GM/Dose powder  prochlorperazine (COMPAZINE) 10 MG tablet  promethazine (PHENERGAN) 12.5 MG tablet  sennosides (SENOKOT) 8.6 MG tablet  traZODone (DESYREL) 50 MG tablet  venlafaxine (EFFEXOR-ER) 225 MG 24 hr tablet  Vitamin D, Cholecalciferol, 25 MCG (1000 UT) TABS      Allergies   Allergen Reactions     Penicillins Rash and Unknown     Family History  Family History   Problem Relation Age of Onset     Diabetes Type 1 Father      Cancer Paternal Grandfather      Social History   Social History     Tobacco Use     Smoking status: Every Day     Packs/day: 1.00     Years: 5.00     Pack years: 5.00     Types: Vaping Device, Cigarettes     Smokeless tobacco: Never   Substance Use Topics     Alcohol use: No     Drug use: No         A medically appropriate review of systems was performed with pertinent positives and negatives noted in the HPI, and all other systems negative.    Physical Exam   BP: 138/89  Pulse: 99  Temp: 98.3  F (36.8  C)  Resp: 16  SpO2: 100 %  Physical Exam  General: no acute distress.  HENT: Normocephalic and atraumatic.  No oropharyngeal exudate.  Mildly tender to palpation of right  occiput without any erythema, edema, bony step-off, crepitus.  No signs of basilar skull fracture.  No midline cervical spinal tenderness or paraspinal musculature tenderness.  Normal neck range of motion.  Eyes: EOMI, conjunctivae normal.  PERRLA  Cardiovascular:  Normal rate and regular rhythm.   No murmur heard.  Pulmonary:  No respiratory distress. Normal breath sounds.   Abdominal: no distension.  Abdomen is soft. There is no mass. There is no abdominal tenderness.   Musculoskeletal:    No swelling or tenderness.  Moving all extremities spontaneously.  Grossly intact strength and sensation bilateral upper and lower extremities.  Normal gait.  Skin: warm and dry  Neurological:  No focal deficit present.  Cranial nerves II through XII intact.  Normal gait.  Psychiatric:    normal affect, answering questions appropriately and following commands appropriately.  Does not appear to be responding to internal stimuli.        ED Course, Procedures, & Data      Procedures       Mental Health Risk Assessment      PSS-3    Date and Time Over the past 2 weeks have you felt down, depressed, or hopeless? Over the past 2 weeks have you had thoughts of killing yourself? Have you ever attempted to kill yourself? When did this last happen? User   01/30/23 1744 yes yes yes within the last month (but not today) AAB              Suicide assessment completed by mental health (D.E.C., LCSW, etc.)       No results found for any visits on 01/30/23.  Medications   acetaminophen (TYLENOL) tablet 650 mg (has no administration in time range)   OLANZapine zydis (zyPREXA) ODT tab 5 mg (has no administration in time range)     Labs Ordered and Resulted from Time of ED Arrival to Time of ED Departure - No data to display  No orders to display          Medical Decision Making  The patient's presentation is strongly suggestive of a stable chronic illness.    The patient's evaluation involved:  review of external note(s) from 3+ sources (see  "separate area of note for details)    The patient's management involved only low risk treatment.      Assessment & Plan    Patient arrives to the emergency department from her group home for chronic suicidality, recently having banged the back of her head against a wall, which was witnessed by  group home employees.  There was no LOC and she is not anticoagulated.  Patient only complains of whole body aches which they note are not new.  Patient denies neurologic symptoms and physical exam is only notable for mild tenderness to the bottom right occiput with deep palpation.  No concerning signs for hematoma, skull fracture, or intracranial hemorrhage.  There was no amnesia to the event, seizure, nausea, or vomiting.  Patient is low risk and I do not feel they require CT head.      Patient met with DEC  for her chronic suicidality.  She does not have a plan today, however in the past she has noted that her plan is to scratch her arms.  They spoke with group home who does not note any new or concerning changes today and feel comfortable taking her back.  They are starting a plan of action for her frequent 911 calls.      I provided her Tylenol 650 mg for\" whole body pain.\"  She request anxiety medications I provided 5 mg p.o. Zyprexa.  She takes nighttime Zyprexa, which she hasn't had yet.  DEC  helped with transport back to her group home.  She was discharged in stable condition.      I have reviewed the nursing notes. I have reviewed the findings, diagnosis, plan and need for follow up with the patient.    New Prescriptions    No medications on file       Final diagnoses:   None       sIa Celis DO   [facility] EMERGENCY DEPARTMENT  1/30/2023     Isa Celis MD  01/30/23 7600    "

## 2023-01-31 NOTE — DISCHARGE INSTRUCTIONS
Aftercare Plan  If I am feeling unsafe or I am in a crisis, I will:   Contact my established care providers   Call the National Suicide Prevention Lifeline: 802.733.4978   Go to the nearest emergency room   Call 911     Warning signs that I or other people might notice when a crisis is developing for me:     I am having increasing suicidal thoughts that turn to plans with intent or means  I am having additional urges to self-harm    My emotions are of hopelessness; feeling like there's no way out.  Rage or anger.  Engaging in risky activities without thinking  Withdrawing from family/friends  Dramatic mood swings  Drastic personality changes   Use of alcohol or drugs  Postings on social media  Neglect of personal hygiene or cares     Things I am able to do on my own to cope or help me feel better:    Spending quality time with loved ones  Staying hydrated  Eating balanced meals  Going for a walk every day  Take care of daily responsibilities/needs  Focus on positive self-talk vs negative self-talk    Things that I am able to do with others to cope or help me better:   Exercise  Music  Deep breathing  Meditations  Journal  Self-regulate  Self check-in  Ask for help    Things I can use or do for distraction:   Reach out to/spend time with family, friends  Shower  Exercise  Chores or do a project  Listen to music  Watch movie/TV  Listening to music  Journaling  Reading a book  Meditating  Call a friend    Changes I can make to support my mental health and wellness:    -I will abstain from all mood altering chemicals not currently prescribed to me   -I will attend scheduled mental health therapy and psychiatric appointments and follow all   recommendations  -I will commit to 30 minutes of self care daily - this can be as simple as taking a shower, going for a   walk, cooking a meal, read, writing, etc  -I will practice square breathing when I begin to feel anxious - in breath through the nose for the count   of 4 and  the first line on the square. Out breath through the mouth for the count of 4 for the second line   of the square. Repeat to complete the square. Repeat the square as many times as needed.  - I will use distraction skills of: going for walks, watching TV, spending time outside, calling a friend or   family member  -Use community resources, including [place] numbers, [facility] crisis and support meetings  -Maintain a daily schedule/routine  -Practice deep breathing skills  -Download a meditation kervin and spend 15-20 minutes per day mediating/relaxing. Some apps to   download include: Calm, Headspace and Insight Timer. All 3 of these apps have free version    Reduce Extreme Emotion  QUICKLY:  Changing Your Body Chemistry      T:  Change your body Temperature to change your autonomic nervous system   Use Ice Water to calm yourself down FAST   Put your face in a bowl of ice water (this is the best way; have the person keep his/her face in ice water for 30-45 seconds - initial research is showing that the longer s/he can hold her/his face in the water, the better the response), or   Splash ice water on your face, or hold an ice pack on your face      I:  Intensely exercise to calm down a body revved up by emotion   Examples: running, walking fast, jumping, playing basketball, weight lifting, swimming, calisthenics, etc.   Engage in exercises that DO NOT include violent behaviors. Exercises that utilize violent behaviors tend to function as  behavioral rehearsal,  and rather than calming the person down, may actually  rev  the person up more, increasing the likelihood of violence, and lessening the likelihood that they will  burn off  energy     P:  Progressively relax your muscles   Starting with your hands, moving to your forearms, upper arms, shoulders, neck, forehead, eyes, cheeks and lips, tongue and teeth, chest, upper back, stomach, buttocks, thighs, calves, ankles, feet   Tense (10 seconds,   of the way), then relax  each muscle (all the way)   Notice the tension   Notice the difference when relaxed (by tensing first, and then relaxing, you are able to get a more thorough relaxation than by simply relaxing)      P: Paced breathing to relax   The standard technique is to begin with counting the number of steps one takes for a typical inhale, then counting the steps one takes for a typical exhale, and then lengthening the amount of steps for the exhalation by one or two steps.  OR  Repeat this pattern for 1-2 minutes  Inhale for four (4) seconds   Exhale for six (6) to eight (8) seconds   Research demonstrated that one can change one's overall level of anxiety by doing this exercise for even a few minutes per day      People in my life that I can ask for help:   Family  Friends  Providers    Your county has a mental health crisis team you can call 24/7:   [facility] Crisis Line Number: [phone]  [facility] Mental Health Crisis: [phone] - Call the crisis line for immediate mental health support, 24 hours a day.   [facility] Crisis Line Number: [phone]  [facility] Crisis Line Number: [phone]  [facility] Crisis Line Number: [phone]   [facility] Crisis Line Number: [phone]  [place]: [phone]  [place]: [phone]  [facility] Crisis Number: [phone]  [facility] Crisis: [phone]      Other things that are important when I'm in crisis:   Ask for help    Additional resources and information:     Mental Health Apps  My3  [email or web address] [email or web address]    VirtualHopeBox  [email or web address]/virtual-hope-[email or web address]       Professionals or Agencies I Can Contact During A Crisis:       Crisis Lines  Crisis Text Line  Text 152958  You will be connected with a trained live crisis counselor to provide support.    The [employer] (LGBTQ Youth Crisis Line)  2.246.731.5593  text START to 590-064    National [place] on Mental Illness (MAGY)   "311.912.3429 or 8.889.MAGY.HELPS    National Suicide Prevention Lifeline at 7-724-069-UFFY (1890)     Throughout  [place]: call **CRISIS (**616883)     Crisis Text Line: is available for free, 24/7 by texting [place] to 113879    Medlio  Fast Tracker  Linking people to mental health and substance use disorder resources  [employer]Helpful Alliance     [place] Mental Health Warm Line  Peer to peer support  Monday thru Saturday, 12 pm to 10 pm  736.348.3931 or 5.125.274.3116  Text \"Support\" to 84129     National [place] on Mental Illness (www.[place].magy.org): 666-999-6840 or 148-322-2369     [facility] Phone (free remote counseling): 911.953.5323 Web address:   https://GalaDo.[email or web address]/     www.[employer] (filter for insurance, gender preference, etc.)    [facility]   [phone]  Intake appointment will be virtual, following appointments can be in person or virtual.   **IMMEDIATE OPENINGS**    [facility]  [phone]  *offers individual therapy, medication management and Mental Health Case Workers; can self refer    [facility]  [phone]  *Immediate Openings    [facility]  [phone]  *Immediate Openings    [facility]  [phone]  *Immediate Openings    Please follow up with scheduled providers to ensure all necessary paperwork is filled out prior to your   scheduled telehealth appointments.     Coordinators from [facility] will be calling within two business days to ensure   that you have the resources you may need or provide assistance with scheduling (Phone number: [phone].).    Remember: give the referrals 3 sessions prior to calling it quits. Do you trust them? Do you feel   understood? Do you think they can help? Check in with yourself after each session    Please reach out to the [facility]([phone]) regarding further mental health appointment " needs for this emergency department visit.    [facility] SCHEDULING:  Today you were seen by a licensed mental health professional through [facility], [facility] ([facility])  for a crisis assessment in the Emergency Department at [facility].  It is recommended that you follow up with your estabished providers (psychiatrist, menta health therapist, and/or primary care doctor - as relevant) as soon as possible. Coordinators from [facility] will be calling you in the next 24-48 hours to ensure that you have the resources you need.  You can so contact [facility] coordinators directly at [phone].     [facility] maintains an extensive network of licensed behavioral health providers to connect patients with the services they need.  We do not charge providers a fee to participate in our referral network.  We match patients with providers based on a patient s specific needs, insurance coverage, and location.  Our first effort will be to refer you to a provider within your care system, and will utilize providers outside your care system as needed.

## 2023-01-31 NOTE — CONSULTS
"Diagnostic Evaluation Consultation  Crisis Assessment    Patient was assessed: In Person  Patient location: [facility] ED   Was a release of information signed: No. Reason: pt declined      Referral Data and Chief Complaint  Sadaf is a 22 year old, who uses she/her pronouns, and presents to the ED via EMS. Patient is referred to the ED by self. Patient is presenting to the ED for the following concerns: suicidal.      Informed Consent and Assessment Methods     Patient is her own guardian. Writer met with patient and explained the crisis assessment process, including applicable information disclosures and limits to confidentiality, assessed understanding of the process, and obtained consent to proceed with the assessment. Patient was observed to be able to participate in the assessment as evidenced by responding to assessment questions. Assessment methods included conducting a formal interview with patient, review of medical records, collaboration with medical staff, and obtaining relevant collateral information from family and community providers when available..     Over the course of this crisis assessment provided reassurance, offered validation and engaged patient in problem solving and disposition planning. Patient's response to interventions was positive.      Summary of Patient Situation     Pt presents to ED for SI and head banging at [facility] home. Pt has long hx of calling 911 from  and seeking secondary gain from attention in the ED. Pt has a care plan in place. Today pt called 911 after banging her head on the  wall (x3). She states she was sad after thinking about the loss of her father. PT arrives to ED and reports SI with plan to hit self on head to die. Denies HI, substance use, and psychosis symptoms. Pt requesting to receive anxiety medication while in the ED. Pt denies any new life stressors, stating \"this is the same stuff as usual\".     Brief Psychosocial History     Lives at  with 24/7 staffing " support. Pt is her own guardian. Pt reports her father passed away 2 years ago. Pt has family and professional support, which she trusts and works with often.     Significant Clinical History        Per recent assessment:  Pt has a hx of multiple ED visits with the most recent being today 12/21/22, 12/19/22, 12/16/22, 12/15/22, and 12/10/22 as well as multiple previous visits prior. Pt has a hx of inpatient hospitalizations with the last one being on 11/21/2021 at [facility].   Pt has hx of dx of Bipolar 1, Depression, and Borderline Personality Disorder. Pt has a PCP Jess Wilkins MD - [facility], psychiatrist Emma Jacobson NP - [facility], and pt states that she is just about to start trauma therapy with Lynne CASTREJON.           Collateral Information  The following information was received from [facility] [place] whose relationship to the patient is group home staff member. Information was obtained via phone. Their phone number is [phone] and they last had contact with patient on today.    What happened today: Pt has been planning to come to ER since Saturday. Today, she started banging her head in her bedroom.  staff told her to stop and focus on her coping skills and safety plan. Pt called 911 herself. Typically,  staff is able to interfere to ensure EMS does not show up as pt has utilized their services a significant number of times.     What is different about patient's functioning: Nothing. Pt has been presenting the same.     Concern about alcohol/drug use: No    What do you think the patient needs: Return to group home. Not sure what else.     Has patient made comments about wanting to kill themselves/others:  Yes Pt makes threats to hurt self often but does not follow through on them    If d/c is recommended, can they take part in safety/aftercare planning: Yes Pt has safety plan in place at     Other information: n/a         Risk Assessment  Ness Suicide Severity Rating Scale Full Clinical Version:                 [place] Suicide Severity Rating Scale Since Last Contact: High risk  Suicidal Ideation (Since Last Contact)  1. Wish to be Dead (Since Last Contact): Yes  2. Non-Specific Active Suicidal Thoughts (Since Last Contact): Yes  3. Active Suicidal Ideation with any Methods (Not Plan) Without Intent to Act (Since Last Contact): Yes  4. Active Suicidal Ideation with Some Intent to Act, Without Specific Plan (Since Last Contact): Yes  5. Active Suicidal Ideation with Specific Plan and Intent (Since Last Contact): Yes  Suicidal Behavior (Since Last Contact)  Actual Attempt (Since Last Contact): No  Has subject engaged in non-suicidal self-injurious behavior? (Since Last Contact): Yes  Interrupted Attempts (Since Last Contact): No  Aborted or Self-Interrupted Attempt (Since Last Contact): No  Preparatory Acts or Behavior (Since Last Contact): No  Suicide (Since Last Contact): No  Actual/Potential Lethality (Most Lethal Attempt)  Most Lethal Attempt Date:  (unknown)  Actual Lethality/Medical Damage Code (Most Lethal Attempt):  (unknown)  C-SSRS Risk (Since Last Contact)  Calculated C-SSRS Risk Score (Since Last Contact): High Risk    Validity of evaluation is impacted by presenting factors during interview. Pt has intellectual disability causing a variety in their answers to [place] questions.    Comments regarding subjective versus objective responses to [place]ah tool: n/a  Environmental or Psychosocial Events: loss of a loved one, challenging interpersonal relationships, helplessness/hopelessness and other life stressors  Chronic Risk Factors: history of psychiatric hospitalization, serious, persistent mental illness, personality disorders and history of Non-Suicidal Self Injury (NSSI)   Warning Signs: seeking access to means to hurt or kill self, talking or writing about death, dying, or suicide and feeling trapped, like there is no way out  Protective Factors: strong bond to family unit, community support, or  employment, responsibilities and duties to others, including pets and children, good treatment engagement, sense of importance of health and wellness and help seeking  Interpretation of Risk Scoring, Risk Mitigation Interventions and Safety Plan:  High risk is valid. Pt is chronically suicidal, but has not taken means to carry out a plan. Typically, pt reports that she is attempting suicide via scratching her wrist. Pt has safety/care plan with group home which has 24/7 staffing.      Does the patient have thoughts of harming others? No     Is the patient engaging in sexually inappropriate behavior?  no        Current Substance Abuse     Is there recent substance abuse? no     Was a urine drug screen or blood alcohol level obtained: No       Mental Status Exam     Affect: Appropriate   Appearance: Appropriate    Attention Span/Concentration: Attentive  Eye Contact: Engaged   Fund of Knowledge: Delayed    Language /Speech Content: Fluent   Language /Speech Volume: Normal    Language /Speech Rate/Productions: Minimally Responsive    Recent Memory: Poor   Remote Memory: Poor   Mood: Anxious and Depressed    Orientation to Person: Yes    Orientation to Place: Yes   Orientation to Time of Day: Yes    Orientation to Date: Yes    Situation (Do they understand why they are here?): Yes    Psychomotor Behavior: Normal    Thought Content: Suicidal   Thought Form: Intact      History of commitment: No         Medication    Psychotropic medications: Yes. Pt is currently taking   No current facility-administered medications for this encounter.     Current Outpatient Medications   Medication     acetaminophen (TYLENOL) 325 MG tablet     alum & mag hydroxide-simethicone (MAALOX  ES) 400-400-40 MG/5ML SUSP suspension     ARIPiprazole lauroxil ER (ARISTADA) 882 MG/3.2ML intra-muscular     benzonatate (TESSALON) 200 MG capsule     benztropine (COGENTIN) 1 MG tablet     calcium carbonate (TUMS) 500 MG chewable tablet     clobetasol  (TEMOVATE) 0.05 % CREA cream     cyclobenzaprine (FLEXERIL) 10 MG tablet     diclofenac (VOLTAREN) 1 % topical gel     docusate sodium (COLACE) 50 MG capsule     fluticasone (FLONASE) 50 MCG/ACT nasal spray     guaiFENesin (MUCINEX) 600 MG 12 hr tablet     hydrocortisone (CORTAID) 0.5 % external cream     hydrOXYzine (ATARAX) 50 MG tablet     hyoscyamine (LEVSIN/SL) 0.125 MG sublingual tablet     ibuprofen (ADVIL/MOTRIN) 400 MG tablet     loperamide (IMODIUM) 2 MG capsule     LORazepam (ATIVAN) 0.5 MG tablet     multivitamin, therapeutic (THERA-VIT) TABS tablet     OLANZapine zydis (ZYPREXA) 5 MG ODT     omeprazole (PRILOSEC) 40 MG DR capsule     ondansetron (ZOFRAN) 4 MG tablet     polyethylene glycol (MIRALAX) 17 GM/Dose powder     prochlorperazine (COMPAZINE) 10 MG tablet     promethazine (PHENERGAN) 12.5 MG tablet     sennosides (SENOKOT) 8.6 MG tablet     traZODone (DESYREL) 50 MG tablet     venlafaxine (EFFEXOR-ER) 225 MG 24 hr tablet     Vitamin D, Cholecalciferol, 25 MCG (1000 UT) TABS    Medication compliant: Yes. Recent medication changes: No  Medication changes made in the last two weeks: No       Current Care Team    Primary Care Provider: Jess Wilkins MD - [facility]  Psychiatrist: Emma Jacobson NP - [facility]  Therapist: Rd Barton MA, [facility], and is starting trauma therapy with Lynne will.  : Yes   Other: Group home.         Diagnosis  Bipolar I disorder, Current or most recent episode depressed, Unspecified F31.9 - Primary, By history   Unspecified intellectual disability (intellectual developmental disorder) F79 - By history   Borderline personality disorder F60.3 - By history       Clinical Summary and Substantiation of Recommendations    Pt is not at risk for harm to self or others at this time. Pt experiences chronic mental health concerns at baseline functioning. Coping skills and safety planning was discussed with pt. Recommended that she utilize outpatient providers to  manage symptoms, rather than seek out the ER if there is not an emergency. Pt agrees.  is willing to take pt back and medical ride will be setup.       Disposition    Recommended disposition: Group Home: Return to ,  utilize outpatient providers       Reviewed case and recommendations with attending provider. Attending Name: Dr. Celis       Attending concurs with disposition: Yes       Patient concurs with disposition: Yes       Guardian concurs with disposition: NA      Final disposition: Group home: REturn to , utilize outpatient providers .     Outpatient Details (if applicable):   Aftercare plan and appointments placed in the AVS and provided to patient: Yes. Given to patient by nursing staff    Was lethal means counseling provided as a part of aftercare planning? No;       Assessment Details    Patient interview started at: 6:00 pm and completed at: 6:45 pm.     Total duration spent on the patient case in minutes: .75 hrs      CPT code(s) utilized: 25343 - Psychotherapy for Crisis - 60 (30-74*) min       CHERI Guajardo, Psychotherapist Trainee, Psychotherapist  [facility] - T[facility] Transition Services  Callback: [phone]    Aftercare Plan  If I am feeling unsafe or I am in a crisis, I will:   Contact my established care providers   Call the National Suicide Prevention Lifeline: 883.576.4922   Go to the nearest emergency room   Call 911     Warning signs that I or other people might notice when a crisis is developing for me:     I am having increasing suicidal thoughts that turn to plans with intent or means  I am having additional urges to self-harm    My emotions are of hopelessness; feeling like there's no way out.  Rage or anger.  Engaging in risky activities without thinking  Withdrawing from family/friends  Dramatic mood swings  Drastic personality changes   Use of alcohol or drugs  Postings on social media  Neglect of personal hygiene or cares     Things I am able to do on my own to cope or help  me feel better:    Spending quality time with loved ones  Staying hydrated  Eating balanced meals  Going for a walk every day  Take care of daily responsibilities/needs  Focus on positive self-talk vs negative self-talk    Things that I am able to do with others to cope or help me better:   Exercise  Music  Deep breathing  Meditations  Journal  Self-regulate  Self check-in  Ask for help    Things I can use or do for distraction:   Reach out to/spend time with family, friends  Shower  Exercise  Chores or do a project  Listen to music  Watch movie/TV  Listening to music  Journaling  Reading a book  Meditating  Call a friend    Changes I can make to support my mental health and wellness:    -I will abstain from all mood altering chemicals not currently prescribed to me   -I will attend scheduled mental health therapy and psychiatric appointments and follow all   recommendations  -I will commit to 30 minutes of self care daily - this can be as simple as taking a shower, going for a   walk, cooking a meal, read, writing, etc  -I will practice square breathing when I begin to feel anxious - in breath through the nose for the count   of 4 and the first line on the square. Out breath through the mouth for the count of 4 for the second line   of the square. Repeat to complete the square. Repeat the square as many times as needed.  - I will use distraction skills of: going for walks, watching TV, spending time outside, calling a friend or   family member  -Use community resources, including hotline numbers, [facility] crisis and support meetings  -Maintain a daily schedule/routine  -Practice deep breathing skills  -Download a meditation kervin and spend 15-20 minutes per day mediating/relaxing. Some apps to   download include: Calm, Headspace and Insight Timer. All 3 of these apps have free version    Reduce Extreme Emotion  QUICKLY:  Changing Your Body Chemistry      T:  Change your body Temperature to change your autonomic nervous  system     Use Ice Water to calm yourself down FAST     Put your face in a bowl of ice water (this is the best way; have the person keep his/her face in ice water for 30-45 seconds - initial research is showing that the longer s/he can hold her/his face in the water, the better the response), or     Splash ice water on your face, or hold an ice pack on your face      I:  Intensely exercise to calm down a body revved up by emotion     Examples: running, walking fast, jumping, playing basketball, weight lifting, swimming, calisthenics, etc.     Engage in exercises that DO NOT include violent behaviors. Exercises that utilize violent behaviors tend to function as  behavioral rehearsal,  and rather than calming the person down, may actually  rev  the person up more, increasing the likelihood of violence, and lessening the likelihood that they will  burn off  energy     P:  Progressively relax your muscles     Starting with your hands, moving to your forearms, upper arms, shoulders, neck, forehead, eyes, cheeks and lips, tongue and teeth, chest, upper back, stomach, buttocks, thighs, calves, ankles, feet     Tense (10 seconds,   of the way), then relax each muscle (all the way)     Notice the tension     Notice the difference when relaxed (by tensing first, and then relaxing, you are able to get a more thorough relaxation than by simply relaxing)      P: Paced breathing to relax     The standard technique is to begin with counting the number of steps one takes for a typical inhale, then counting the steps one takes for a typical exhale, and then lengthening the amount of steps for the exhalation by one or two steps.  OR    Repeat this pattern for 1-2 minutes    Inhale for four (4) seconds     Exhale for six (6) to eight (8) seconds     Research demonstrated that one can change one's overall level of anxiety by doing this exercise for even a few minutes per day      People in my life that I can ask for help:  "  Family  Friends  Providers    Your county has a mental health crisis team you can call 24/7:   [facility] Crisis Line Number: [phone]  [facility] Mental Health Crisis: [phone] - Call the crisis line for immediate mental health support, 24 hours a day.   [facility] Crisis Line Number: [phone]  [facility] Crisis Line Number: [phone]  [facility] Crisis Line Number: [phone]   [facility] Crisis Line Number: [phone]  [place]: [phone]  [place]: [phone]  [facility] Crisis Number: [phone]  [facility] Crisis: [phone]      Other things that are important when I'm in crisis:   Ask for help    Additional resources and information:     Mental Health Apps  My3  https://Netsket.[employer]/    VirtualHopeBox  https://Resolver/apps/virtual-hope-box/       Professionals or Agencies I Can Contact During A Crisis:       Crisis Lines  Crisis Text Line  Text 486058  You will be connected with a trained live crisis counselor to provide support.    The Mikey Project (LGBTQ Youth Crisis Line)  5.777.362.6336  text START to 040-480    National [place] on Mental Illness (MAGY)  780.690.9528 or 2.880.MAGY.HELPS    National Suicide Prevention Lifeline at 2-360-081-IKFD (2966)     Throughout  [place]: call **CRISIS (**499074)     Crisis Text Line: is available for free, 24/7 by texting [place] to 951444    Community Resources  Fast Tracker  Linking people to mental health and substance use disorder resources  fasttrackermn.org     [place] Mental Health Warm Line  Peer to peer support  Monday thru Saturday, 12 pm to 10 pm  654.291.7905 or 1.652.324.9098  Text \"Support\" to 13918     National [place] on Mental Illness (www.[place].magy.org): 975.761.3909 or 486-265-6916     [facility] Phone (free remote counseling): 853.231.6027 Web address:   https://walkin.org/     www.psychologytoday.com (filter for " insurance, gender preference, etc.)    [facility]   [phone]  Intake appointment will be virtual, following appointments can be in person or virtual.   **IMMEDIATE OPENINGS**    [facility]  [phone]  *offers individual therapy, medication management and Mental Health Case Workers; can self refer    [facility]  [phone]  *Immediate Openings    [facility]  [phone]  *Immediate Openings    [facility]  [phone]  *Immediate Openings    Please follow up with scheduled providers to ensure all necessary paperwork is filled out prior to your   scheduled telehealth appointments.     Coordinators from [facility] will be calling within two business days to ensure   that you have the resources you may need or provide assistance with scheduling (Phone number: [phone].).    Remember: give the referrals 3 sessions prior to calling it quits. Do you trust them? Do you feel   understood? Do you think they can help? Check in with yourself after each session    Please reach out to the [facility]([phone]) regarding further mental health appointment needs for this emergency department visit.    [facility] SCHEDULING:  Today you were seen by a licensed mental health professional through [facility], [facility] ([facility])  for a crisis assessment in the Emergency Department at [facility].  It is recommended that you follow up with your estabished providers (psychiatrist, menta health therapist, and/or primary care doctor - as relevant) as soon as possible. Coordinators from [facility] will be calling you in the next 24-48 hours to ensure that you have the resources you need.  You can so contact [facility] coordinators directly at [phone].     [facility] maintains an extensive network of licensed behavioral health providers to connect patients with the services they need.   We do not charge providers a fee to participate in our referral network.  We match patients with providers based on a patient s specific needs, insurance coverage, and location.  Our first effort will be to refer you to a provider within your care system, and will utilize providers outside your care system as needed.

## 2023-02-10 ENCOUNTER — MEDICAL CORRESPONDENCE (OUTPATIENT)
Dept: EMERGENCY MEDICINE | Facility: CLINIC | Age: 23
End: 2023-02-10

## 2023-02-11 ENCOUNTER — HOSPITAL ENCOUNTER (EMERGENCY)
Facility: CLINIC | Age: 23
Discharge: HOME | End: 2023-02-12
Attending: EMERGENCY MEDICINE | Admitting: EMERGENCY MEDICINE
Payer: MEDICARE

## 2023-02-11 DIAGNOSIS — K59.00 CONSTIPATION, UNSPECIFIED CONSTIPATION TYPE: ICD-10-CM

## 2023-02-11 PROCEDURE — 99284 EMERGENCY DEPT VISIT MOD MDM: CPT | Performed by: EMERGENCY MEDICINE

## 2023-02-11 PROCEDURE — 81025 URINE PREGNANCY TEST: CPT | Performed by: EMERGENCY MEDICINE

## 2023-02-11 PROCEDURE — 99283 EMERGENCY DEPT VISIT LOW MDM: CPT | Performed by: EMERGENCY MEDICINE

## 2023-02-11 ASSESSMENT — ACTIVITIES OF DAILY LIVING (ADL): ADLS_ACUITY_SCORE: 35

## 2023-02-12 ENCOUNTER — APPOINTMENT (OUTPATIENT)
Dept: GENERAL RADIOLOGY | Facility: CLINIC | Age: 23
End: 2023-02-12
Attending: EMERGENCY MEDICINE
Payer: MEDICARE

## 2023-02-12 VITALS
DIASTOLIC BLOOD PRESSURE: 62 MMHG | TEMPERATURE: 97.7 F | OXYGEN SATURATION: 99 % | RESPIRATION RATE: 18 BRPM | HEART RATE: 83 BPM | SYSTOLIC BLOOD PRESSURE: 109 MMHG

## 2023-02-12 LAB — HCG UR QL: NEGATIVE

## 2023-02-12 PROCEDURE — 74019 RADEX ABDOMEN 2 VIEWS: CPT

## 2023-02-12 RX ORDER — BISACODYL 5 MG/1
5 TABLET, DELAYED RELEASE ORAL DAILY PRN
Qty: 30 TABLET | Refills: 0 | Status: SHIPPED | OUTPATIENT
Start: 2023-02-12

## 2023-02-12 ASSESSMENT — ACTIVITIES OF DAILY LIVING (ADL): ADLS_ACUITY_SCORE: 37

## 2023-02-12 NOTE — ED PROVIDER NOTES
[facility] [place] EMERGENCY DEPARTMENT ([facility])    2/11/23      ED PROVIDER NOTE  ED 20  11:27 PM   History     Chief Complaint   Patient presents with     Rectal/perineal Pain     BIBA from group home for pain while having bowel movements. Pt has hemorrhoids and given prescription for wipes and ointment but has not been taking prescription.      The history is provided by the patient and medical records.     Sadaf Ross is a 22 year old female with history of borderline personality disorder, anal fissure, and hemorrhoids who presents from group home with complaints of difficulty having bowel movements for the past 2 weeks.  She last stooled yesterday. She had to strain a lot to stool. She has tried miralax but no suppositories. No abdominal pain.  Endorses nausea and vomiting on a daily basis, this is similar to previous.  She states that taking Pepto is sometimes helpful. She has known hemorrhoids, has been prescribed rectal suppositories for this. She denies any rectal pain. She took some miralax just before she came here; this was her first time using it in the 2 weeks that she has had symptoms.     Past Medical History  Past Medical History:   Diagnosis Date     ADHD (attention deficit hyperactivity disorder)      Bipolar 1 disorder (H)      Borderline personality disorder (H)      Depression      Depressive disorder      Intellectual disability      Obesity      Syncope      Past Surgical History:   Procedure Laterality Date     APPENDECTOMY       APPENDECTOMY       acetaminophen (TYLENOL) 325 MG tablet  alum & mag hydroxide-simethicone (MAALOX  ES) 400-400-40 MG/5ML SUSP suspension  ARIPiprazole lauroxil ER (ARISTADA) 882 MG/3.2ML intra-muscular  benzonatate (TESSALON) 200 MG capsule  benztropine (COGENTIN) 1 MG tablet  calcium carbonate (TUMS) 500 MG chewable tablet  clobetasol (TEMOVATE) 0.05 % CREA cream  cyclobenzaprine (FLEXERIL) 10 MG tablet  diclofenac (VOLTAREN) 1 % topical gel  docusate  sodium (COLACE) 50 MG capsule  fluticasone (FLONASE) 50 MCG/ACT nasal spray  guaiFENesin (MUCINEX) 600 MG 12 hr tablet  hydrocortisone (CORTAID) 0.5 % external cream  hydrOXYzine (ATARAX) 50 MG tablet  hyoscyamine (LEVSIN/SL) 0.125 MG sublingual tablet  ibuprofen (ADVIL/MOTRIN) 400 MG tablet  loperamide (IMODIUM) 2 MG capsule  LORazepam (ATIVAN) 0.5 MG tablet  multivitamin, therapeutic (THERA-VIT) TABS tablet  OLANZapine zydis (ZYPREXA) 5 MG ODT  omeprazole (PRILOSEC) 40 MG DR capsule  ondansetron (ZOFRAN) 4 MG tablet  polyethylene glycol (MIRALAX) 17 GM/Dose powder  prochlorperazine (COMPAZINE) 10 MG tablet  promethazine (PHENERGAN) 12.5 MG tablet  sennosides (SENOKOT) 8.6 MG tablet  traZODone (DESYREL) 50 MG tablet  venlafaxine (EFFEXOR-ER) 225 MG 24 hr tablet  Vitamin D, Cholecalciferol, 25 MCG (1000 UT) TABS      Allergies   Allergen Reactions     Penicillins Rash and Unknown     Family History  Family History   Problem Relation Age of Onset     Diabetes Type 1 Father      Cancer Paternal Grandfather      Social History   Social History     Tobacco Use     Smoking status: Every Day     Packs/day: 1.00     Years: 5.00     Pack years: 5.00     Types: Vaping Device, Cigarettes     Smokeless tobacco: Never   Substance Use Topics     Alcohol use: No     Drug use: No         A medically appropriate review of systems was performed with pertinent positives and negatives noted in the HPI, and all other systems negative.    Physical Exam   BP: 132/76  Pulse: 75  Temp: 97.7  F (36.5  C)  Resp: 14  SpO2: 98 %  Physical Exam    General: Awake alert no acute distress  Lungs: Breathing comfortably on room air  Heart: Regular rate  Abdomen: Soft nontender no guarding no peritoneal signs  Neuro: Awake alert answers questions appropriately moves all extremities equally    ED Course, Procedures, & Data      Procedures          Results for orders placed or performed during the hospital encounter of 02/11/23   XR Abdomen 2 Views      Status: None    Narrative    EXAM: XR ABDOMEN 2 VIEWS  LOCATION: [facility]  DATE/TIME: 2/12/2023 12:39 AM    INDICATION: constipation and abdominal pain  COMPARISON: 01/08/2022      Impression    IMPRESSION: Moderate amount of colonic stool with gaseous prominence of the proximal colon. No radiographic evidence of small bowel dilatation. No subdiaphragmatic free air on the upright view.   HCG qualitative urine     Status: Normal   Result Value Ref Range    hCG Urine Qualitative Negative Negative     Medications - No data to display  Labs Ordered and Resulted from Time of ED Arrival to Time of ED Departure   HCG QUALITATIVE URINE - Normal       Result Value    hCG Urine Qualitative Negative       XR Abdomen 2 Views   Final Result   IMPRESSION: Moderate amount of colonic stool with gaseous prominence of the proximal colon. No radiographic evidence of small bowel dilatation. No subdiaphragmatic free air on the upright view.             Medical Decision Making  The patient's presentation is strongly suggestive of a stable chronic illness.    The patient's evaluation involved:  review of external note(s) from 1 sources (see separate area of note for details)  ordering and/or review of 1 test(s) in this encounter (see separate area of note for details)    The patient's management involved prescription drug management (including medications given in the ED).      Assessment & Plan    Sadaf Ross is a 22 year old female who presents to the emergency department with difficulty stooling for the last 2 weeks; notes that her last stool was yesterday but it was a difficult to pass.  Denies rectal pain associated with this.  Does endorse some nausea though this appears chronic as patient has been seen in the ER numerous times for this.  On exam she is well-appearing, nontoxic, has a benign abdominal exam.  Will assess for constipation with  abdominal x-ray.  Patient reports she  tried 1 dose of MiraLAX.  Will  on appropriate MiraLAX use.  If patient has a large stool burden to the rectum would also consider a suppository.     On reassessment her x-ray showed moderate amount of colonic school without evidence of small bowel obstruction.  Repeat abdominal exam remained benign.  Ordered a glycerin suppository though patient declined.  Discussed MiraLAX and Duclax and ER return precautions.    Patient will be discharged home with instructions to titrate MiraLAX to effect starting with 1 capful in a full glass of water and can take this along with Dulcolax.  If she does not get bowel movement in several hours she could repeat this.  Return to the ER for severe abdominal pain, fever or new symptoms.    This part of the document was transcribed by Larisa Grant Medical Scribe.    I have reviewed the nursing notes. I have reviewed the findings, diagnosis, plan and need for follow up with the patient.    New Prescriptions    No medications on file       Final diagnoses:   Constipation, unspecified constipation type       I, Larisa Grant, am serving as a trained medical scribe to document services personally performed by Bernardino Schwarz MD based on the provider's statements to me on February 11, 2023.  This document has been checked and approved by the attending provider.    I, Bernardino Schwarz MD, was physically present and have reviewed and verified the accuracy of this note documented by Larisa Grant medical scribe.      Bernardino Schwarz MD     [facility] EMERGENCY DEPARTMENT  2/11/2023     Bernardino Schwarz MD  02/12/23 0105

## 2023-02-12 NOTE — ED NOTES
Bed: ED20  Expected date: 2/11/23  Expected time: 10:27 PM  Means of arrival: Ambulance  Comments:  [address]  22F  Constipated  
Writer spoke with group staff Arlene of Discharge  
partner

## 2023-02-12 NOTE — DISCHARGE INSTRUCTIONS
Take more MiraLAX.  You can take as much MiraLAX as you need to have normal bowel movements.  Start with 1 capful of MiraLAX, if you do not have a bowel movement in 2 to 3 hours you can take another capful of MiraLAX.  If you do not have a bowel movement with a single capful of MiraLAX you can try 2 capfuls.  Make sure that you are always taking a full glass of water with the MiraLAX.  Would also recommend some type of stimulant laxative like a DuColace.  Once you are having several soft stools a day for 3 to 4 days you can decrease the amount of MiraLAX until you are having 1 soft stool a day.  You can also start a daily Colace at this time to maintain normal regular stools.    If you develop significant abdominal pain please return to the ER.

## 2023-02-12 NOTE — ED TRIAGE NOTES
BIBA. Been having trouble using the restroom for the past 2 weeks. Patient has hemorrhoids, and it is causing too much pain preventing the use of the restroom. Mild chest pain, lower abdominal and back pain.      Triage Assessment     Row Name 02/11/23 8460       Triage Assessment (Adult)    Airway WDL WDL       Respiratory WDL    Respiratory WDL WDL       Skin Circulation/Temperature WDL    Skin Circulation/Temperature WDL WDL       Cardiac WDL    Cardiac WDL WDL       Peripheral/Neurovascular WDL    Peripheral Neurovascular WDL WDL       Cognitive/Neuro/Behavioral WDL    Cognitive/Neuro/Behavioral WDL WDL

## 2023-02-14 ENCOUNTER — NURSE TRIAGE (OUTPATIENT)
Dept: NURSING | Facility: CLINIC | Age: 23
End: 2023-02-14
Payer: MEDICARE

## 2023-02-14 ENCOUNTER — HOSPITAL ENCOUNTER (EMERGENCY)
Facility: CLINIC | Age: 23
Discharge: NURSING, REHAB OR LONG-TERM CARE | End: 2023-02-14
Attending: PSYCHIATRY & NEUROLOGY | Admitting: PSYCHIATRY & NEUROLOGY
Payer: MEDICARE

## 2023-02-14 VITALS
HEART RATE: 108 BPM | SYSTOLIC BLOOD PRESSURE: 126 MMHG | OXYGEN SATURATION: 98 % | RESPIRATION RATE: 16 BRPM | DIASTOLIC BLOOD PRESSURE: 79 MMHG | HEIGHT: 64 IN | WEIGHT: 236 LBS | BODY MASS INDEX: 40.29 KG/M2 | TEMPERATURE: 98.2 F

## 2023-02-14 DIAGNOSIS — F43.0 ACUTE REACTION TO STRESS: ICD-10-CM

## 2023-02-14 DIAGNOSIS — F79 INTELLECTUAL DISABILITY: ICD-10-CM

## 2023-02-14 PROCEDURE — 90791 PSYCH DIAGNOSTIC EVALUATION: CPT

## 2023-02-14 PROCEDURE — 99285 EMERGENCY DEPT VISIT HI MDM: CPT | Mod: 25 | Performed by: PSYCHIATRY & NEUROLOGY

## 2023-02-14 PROCEDURE — 99283 EMERGENCY DEPT VISIT LOW MDM: CPT | Performed by: PSYCHIATRY & NEUROLOGY

## 2023-02-14 RX ORDER — AMOXICILLIN 250 MG
1 CAPSULE ORAL ONCE
Status: COMPLETED | OUTPATIENT
Start: 2023-02-14 | End: 2023-02-14

## 2023-02-14 RX ORDER — LORATADINE 10 MG/1
1 TABLET ORAL DAILY
COMMUNITY
Start: 2023-02-07 | End: 2023-07-14

## 2023-02-14 RX ORDER — POLYETHYLENE GLYCOL 3350 17 G/17G
17 POWDER, FOR SOLUTION ORAL DAILY
Status: DISCONTINUED | OUTPATIENT
Start: 2023-02-14 | End: 2023-02-14 | Stop reason: HOSPADM

## 2023-02-14 ASSESSMENT — COLUMBIA-SUICIDE SEVERITY RATING SCALE - C-SSRS
6. HAVE YOU EVER DONE ANYTHING, STARTED TO DO ANYTHING, OR PREPARED TO DO ANYTHING TO END YOUR LIFE?: NO
ATTEMPT SINCE LAST CONTACT: NO
TOTAL  NUMBER OF INTERRUPTED ATTEMPTS SINCE LAST CONTACT: NO
1. SINCE LAST CONTACT, HAVE YOU WISHED YOU WERE DEAD OR WISHED YOU COULD GO TO SLEEP AND NOT WAKE UP?: NO
5. HAVE YOU STARTED TO WORK OUT OR WORKED OUT THE DETAILS OF HOW TO KILL YOURSELF? DO YOU INTEND TO CARRY OUT THIS PLAN?: NO
REASONS FOR IDEATION SINCE LAST CONTACT: EQUALLY TO GET ATTENTION, REVENGE, OR A REACTION FROM OTHERS AND TO END/STOP THE PAIN
TOTAL  NUMBER OF ABORTED OR SELF INTERRUPTED ATTEMPTS SINCE LAST CONTACT: NO
2. HAVE YOU ACTUALLY HAD ANY THOUGHTS OF KILLING YOURSELF?: NO
SUICIDE, SINCE LAST CONTACT: NO

## 2023-02-14 ASSESSMENT — ACTIVITIES OF DAILY LIVING (ADL): ADLS_ACUITY_SCORE: 37

## 2023-02-14 NOTE — TELEPHONE ENCOUNTER
"Triage Call:    Caller: Patient  Patient is having some \"difficulty with my body, it hurts all over\".  She is wanting to get an appointment to get it checked out.   The abdomen is the area of most pain.    Pain is constant, it is worse than in the ER.    She is to have GI Testing tomorrow    Rating pain 9/10.      Protocol Recommended Disposition: ED    Caller verbalized understanding of instructions and questions answered.      Vanda Hamm RN on 2/14/2023 at 5:32 PM        Reason for Disposition    [1] SEVERE pain (e.g., excruciating) AND [2] present > 1 hour    Additional Information    Negative: Shock suspected (e.g., cold/pale/clammy skin, too weak to stand, low BP, rapid pulse)    Negative: Difficult to awaken or acting confused (e.g., disoriented, slurred speech)    Negative: Passed out (i.e., lost consciousness, collapsed and was not responding)    Negative: Sounds like a life-threatening emergency to the triager    Protocols used: ABDOMINAL PAIN - FEMALE-A-AH      "

## 2023-02-15 ENCOUNTER — HOSPITAL ENCOUNTER (OUTPATIENT)
Dept: NUCLEAR MEDICINE | Facility: CLINIC | Age: 23
Setting detail: NUCLEAR MEDICINE
Discharge: HOME | End: 2023-02-15
Payer: MEDICARE

## 2023-02-15 PROCEDURE — 343N000001 HC RX 343

## 2023-02-15 PROCEDURE — 78264 GASTRIC EMPTYING IMG STUDY: CPT

## 2023-02-15 PROCEDURE — A9541 TC99M SULFUR COLLOID: HCPCS

## 2023-02-15 PROCEDURE — 78264 GASTRIC EMPTYING IMG STUDY: CPT | Mod: 26 | Performed by: RADIOLOGY

## 2023-02-15 RX ADMIN — Medication 2 MILLICURIE: at 07:56

## 2023-02-15 NOTE — ED NOTES
The following information was received from Arlene  staff whose relationship to the patient is  staff member. Information was obtained via phone. Their phone number is [phone] and they last had contact with patient on today 2/14/23.    What happened today: Nothing new, I was told she ate a lot and then starting plotting to come to the hospital complaining of stomach pain. No concerns with her returning, and thinks it is best she return.     What is different about patient's functioning: Nothing, pt's presentation consistent with previous visits.     Concern about alcohol/drug use: No    What do you think the patient needs: Return to     Has patient made comments about wanting to kill themselves/others:  Yes, Pt often makes threats to hurt themself, but does not follow through on these threats.    If d/c is recommended, can they take part in safety/aftercare planning: Yes, pt has safety plan in place at

## 2023-02-15 NOTE — DISCHARGE INSTRUCTIONS
"Aftercare Plan  If I am feeling unsafe or I am in a crisis, I will:   Contact my established care providers   Call the National Suicide Prevention Lifeline: 988  Go to the nearest emergency room   Call 911     Things I am able to do on my own or with others to cope or help me feel better: listen to music, practice deep breathing, talk to peers, family or group home staff, practice yoga or meditation, go for a walk with a loved one    Changes I can make to support my mental health and wellness: adhere to treatment recommendations, take medication as prescribed, talk to group home staff, continue following up with current outpatient providers    People in my life that I can ask for help: mom, group home staff, outpatient supports    Your [facility] has a mental health crisis team you can call 24/7: [facility] Mobile Crisis  [phone]     Crisis Lines  Crisis Text Line  Text 682776  You will be connected with a trained live crisis counselor to provide support.    Por espanol, texto  SIRENA a 639103 o texto a 442-AYUDAME en WhatsApp    The Mikey Project (LGBTQ Youth Crisis Line)  1.810.720.7912  text START to 929-551      Community Resources  Fast Tracker  Linking people to mental health and substance use disorder resources  fastKinoosckAccess Scientificn.org     [place] Mental Health Warm Line  Peer to peer support  Monday thru Saturday, 12 pm to 10 pm  192.309.7695 or 1.150.110.8036  Text \"Support\" to 73605    National [place] on Mental Illness (MAGY)  293.236.3239 or 1.888.MAGY.HELPS      Mental Health Apps  My3  https://my3app.org/    VirtualHopeBox  https://SelectHubde.org/apps/virtual-hope-box/      Additional Information  Today you were seen by a licensed mental health professional through Triage and Transition services, [facility] ([facility])  for a crisis assessment in the Emergency Department at [facility].  It is recommended that you follow up with your established providers " (psychiatrist, mental health therapist, and/or primary care doctor - as relevant) as soon as possible. Coordinators from [facility] will be calling you in the next 24-48 hours to ensure that you have the resources you need.  You can also contact [facility] coordinators directly at [phone]. You may have been scheduled for or offered an appointment with a mental health provider. [facility] maintains an extensive network of licensed behavioral health providers to connect patients with the services they need.  We do not charge providers a fee to participate in our referral network.  We match patients with providers based on a patient's specific needs, insurance coverage, and location.  Our first effort will be to refer you to a provider within your care system, and will utilize providers outside your care system as needed.

## 2023-02-15 NOTE — ED NOTES
Bed: [facility]  Expected date: 2/14/23  Expected time: 6:34 PM  Means of arrival: Ambulance  Comments:  22 F SI voluntary

## 2023-02-15 NOTE — ED PROVIDER NOTES
[facility] [place] EMERGENCY DEPARTMENT ([facility])    2/14/23      ED PROVIDER NOTE    History     Chief Complaint   Patient presents with     Suicidal     Patient presents via EMS; Suicidal, denies plan.     HPI  Sadaf Ross is a 22 year old female who is well known to the ED with a PMH of borderline personality disorder, intellectual disability, and bipolar 1. She presents today from her group home after having called 911 on herself. Staff at the group home stated that she has been planning on coming to the ED all day for a stomach ache she has had all day. She has been feeling general malaise for the last 4 days. She initially stated she was suicidal with a plan, but upon assessment she denied this.      Past Medical History  Past Medical History:   Diagnosis Date     ADHD (attention deficit hyperactivity disorder)      Bipolar 1 disorder (H)      Borderline personality disorder (H)      Depression      Depressive disorder      Intellectual disability      Obesity      Syncope      Past Surgical History:   Procedure Laterality Date     APPENDECTOMY       APPENDECTOMY       loratadine (CLARITIN) 10 MG tablet  acetaminophen (TYLENOL) 325 MG tablet  alum & mag hydroxide-simethicone (MAALOX  ES) 400-400-40 MG/5ML SUSP suspension  ARIPiprazole lauroxil ER (ARISTADA) 882 MG/3.2ML intra-muscular  benzonatate (TESSALON) 200 MG capsule  benztropine (COGENTIN) 1 MG tablet  bisacodyl (DULCOLAX) 5 MG EC tablet  calcium carbonate (TUMS) 500 MG chewable tablet  clobetasol (TEMOVATE) 0.05 % CREA cream  cyclobenzaprine (FLEXERIL) 10 MG tablet  diclofenac (VOLTAREN) 1 % topical gel  docusate sodium (COLACE) 50 MG capsule  fluticasone (FLONASE) 50 MCG/ACT nasal spray  guaiFENesin (MUCINEX) 600 MG 12 hr tablet  hydrocortisone (CORTAID) 0.5 % external cream  hydrOXYzine (ATARAX) 50 MG tablet  hyoscyamine (LEVSIN/SL) 0.125 MG sublingual tablet  ibuprofen (ADVIL/MOTRIN) 400 MG tablet  loperamide (IMODIUM) 2 MG  "capsule  LORazepam (ATIVAN) 0.5 MG tablet  multivitamin, therapeutic (THERA-VIT) TABS tablet  OLANZapine zydis (ZYPREXA) 5 MG ODT  omeprazole (PRILOSEC) 40 MG DR capsule  ondansetron (ZOFRAN) 4 MG tablet  polyethylene glycol (MIRALAX) 17 GM/Dose powder  prochlorperazine (COMPAZINE) 10 MG tablet  promethazine (PHENERGAN) 12.5 MG tablet  sennosides (SENOKOT) 8.6 MG tablet  traZODone (DESYREL) 50 MG tablet  venlafaxine (EFFEXOR-ER) 225 MG 24 hr tablet  Vitamin D, Cholecalciferol, 25 MCG (1000 UT) TABS      Allergies   Allergen Reactions     Penicillins Rash and Unknown     Family History  Family History   Problem Relation Age of Onset     Diabetes Type 1 Father      Cancer Paternal Grandfather      Social History   Social History     Tobacco Use     Smoking status: Every Day     Packs/day: 1.00     Years: 5.00     Pack years: 5.00     Types: Vaping Device, Cigarettes     Smokeless tobacco: Never   Substance Use Topics     Alcohol use: No     Drug use: No         A medically appropriate review of systems was performed with pertinent positives and negatives noted in the HPI, and all other systems negative.    Physical Exam   BP: 126/79  Pulse: 108  Temp: 98.2  F (36.8  C)  Resp: 16  Height: 162.6 cm (5' 4\")  Weight: 107 kg (236 lb)  SpO2: 98 %  Physical Exam  Vitals and nursing note reviewed.   HENT:      Head: Normocephalic.   Eyes:      Pupils: Pupils are equal, round, and reactive to light.   Pulmonary:      Effort: Pulmonary effort is normal.   Musculoskeletal:         General: Normal range of motion.      Cervical back: Normal range of motion.   Neurological:      General: No focal deficit present.      Mental Status: She is alert.   Psychiatric:         Attention and Perception: Attention and perception normal. She does not perceive auditory or visual hallucinations.         Mood and Affect: Mood and affect normal.         Speech: Speech normal.         Behavior: Behavior normal. Behavior is not agitated, " aggressive, hyperactive or combative. Behavior is cooperative.         Thought Content: Thought content normal. Thought content is not paranoid or delusional. Thought content does not include homicidal or suicidal ideation.         Cognition and Memory: Cognition and memory normal.         Judgment: Judgment normal.           ED Course, Procedures, & Data      Procedures       ED Course Selections: A consult was attained from the [facility] service. The case was discussed with Sabrina Almonte from that service. The consulting service's recommendations were provided at 8:00 PM. 10 minutes spent discussing case, care and disposition.    10 minutes spent reviewing prior records including EMS report, last ED visit from 2 days ago when she complained of abdominal pain, and nursing phone call shortly before patient came into the ED.     Mental Health Risk Assessment      PSS-3    Date and Time Over the past 2 weeks have you felt down, depressed, or hopeless? Over the past 2 weeks have you had thoughts of killing yourself? Have you ever attempted to kill yourself? When did this last happen? User   02/14/23 1843 yes yes yes within the last month (but not today) TMW      C-SSRS ([place])    Date and Time Q1 Wished to be Dead (Past Month) Q2 Suicidal Thoughts (Past Month) Q3 Suicidal Thought Method Q4 Suicidal Intent without Specific Plan Q5 Suicide Intent with Specific Plan Q6 Suicide Behavior (Lifetime) Within the Past 3 Months? RETIRED: Level of Risk per Screen Screening Not Complete User   02/14/23 1843 yes yes no -- no yes -- -- -- TMW              Suicide assessment completed by mental health (D.E.C., LCSW, etc.)       No results found for any visits on 02/14/23.  Medications   polyethylene glycol (MIRALAX) Packet 17 g (17 g Oral Not Given 2/14/23 2000)   senna-docusate (SENOKOT-S/PERICOLACE) 8.6-50 MG per tablet 1 tablet (1 tablet Oral Not Given 2/14/23 2000)     Labs Ordered and Resulted from Time of ED Arrival to Time of  ED Departure - No data to display  No orders to display          Medical Decision Making  The patient's presentation is strongly suggestive of a stable chronic illness.    The patient's evaluation involved:  an assessment requiring an independent historian (see separate area of note for details)  review of external note(s) from 2 sources (see separate area of note for details)    The patient's management involved prescription drug management (including medications given in the ED), limitations due to social determinants of health (see separate area of note for details) and a decision regarding hospitalization.      Assessment & Plan    Patient is here for a mental health assessment. She is well-known to us from her previous multiple visits, including 2 days ago when she came in for abdominal pain. She was diagnosed with constipation at that time and given Miralax. She had called the nurses line this afternoon wanting to be seen for body pain. There was NO mention of any suicidal thinking. She was brought in for suicidal thinking however, but denied having such thoughts when assessed.    I offered both Miralax and Sennakot to address her constipation. She declined the offer. She declined any further medication and ultimately wanted to go home.    Patient was not in any physical discomfort. Her exam was benign. Patient can be discharged home per her request. She is recommended to follow-up established care and services.    I have reviewed the nursing notes. I have reviewed the findings, diagnosis, plan and need for follow up with the patient.    Discharge Medication List as of 2/14/2023  8:40 PM          Final diagnoses:   Acute reaction to stress   Intellectual disability   ISergio, am serving as a trained medical scribe to document services personally performed by Magno Sena MD, based on the provider's statements to me.     Magno MCGINNIS MD, was physically present and have reviewed and  verified the accuracy of this note documented by Sergio Sargent.      Magno Sena MD  [facility] EMERGENCY DEPARTMENT  2/14/2023     Magno Sena MD  02/14/23 2053

## 2023-02-15 NOTE — ED TRIAGE NOTES
Patient presents via EMS; Suicidal, denies plan.     Triage Assessment     Row Name 02/14/23 7117       Triage Assessment (Adult)    Airway WDL WDL       Respiratory WDL    Respiratory WDL WDL       Skin Circulation/Temperature WDL    Skin Circulation/Temperature WDL WDL       Cardiac WDL    Cardiac WDL WDL       Peripheral/Neurovascular WDL    Peripheral Neurovascular WDL WDL       Cognitive/Neuro/Behavioral WDL    Cognitive/Neuro/Behavioral WDL WDL

## 2023-02-15 NOTE — CONSULTS
Diagnostic Evaluation Consultation  Crisis Assessment    Patient was assessed: In Person  Patient location: [facility] ED  Was a release of information signed: No. Reason: declined      Referral Data and Chief Complaint  Sadaf is a 22 year old, who uses she/her pronouns, and presents to the ED via EMS. Patient is referred to the ED by self. Patient is presenting to the ED for the following concerns: suicidal ideation.       Informed Consent and Assessment Methods     Patient is her own guardian. Writer met with patient and explained the crisis assessment process, including applicable information disclosures and limits to confidentiality, assessed understanding of the process, and obtained consent to proceed with the assessment. Patient was observed to be able to participate in the assessment as evidenced by being alert, oriented, and engaged. Assessment methods included conducting a formal interview with patient, review of medical records, collaboration with medical staff, and obtaining relevant collateral information from family and community providers when available.    Over the course of this crisis assessment provided reassurance, offered validation, engaged patient in problem solving and disposition planning and worked with patient on safety and aftercare planning. Patient's response to interventions was calm and cooperative.      Summary of Patient Situation    Pt is well known to the ED with a PMH of borderline personality disorder, intellectual disability, and bipolar 1. She presents today from her group home after having called 911 on herself. Staff at the group home report no new concerns with pt. They report that she consumed a large amount of food today and developed a stomach ache from this. They report she had been making plans all day since then to come to the ED. When pt first arrived to triage, she reported SI with no plan. Stated she has been biting herself in an attempt to harm herself. At the time of  assessment, patient did not mention any SI and denied this when asked. She reported she has been feeling body aches all over and feeling generally unwell for the past 4-5 days. She believes she may have a cold or be suffering from seasonal allergies. She reports her uncle from [place] is also sick and believes she may have caught the same illness as him, though states she has not seen him for a long time. She also reports the anniversary of her father's passing is coming up soon, which is always a difficult time for her. Denies HI, hallucinations, delusions or substance use. Denies any other recent stressors.     Brief Psychosocial History     Pt currently resides at a group home with 24/7 staffing. She is her own guardian. Patient's father passed away approximately 2 years ago and she continues to struggle with managing her grief from this. Pt has a supportive family and established outpatient supports.     Significant Clinical History     Patient has a hx of multiple ED visits, with the most recent being on 2/11/23 for various chief complaints, seeking secondary gain from attention in the ED and not wanting to be at her group home. Pt has a hx of inpatient hospitalizations with the last one being on 11/21/2021 at [facility]. Pt has 24/7 group home staffing who try to support the patient and encourage her to talk to them or utilize other resources other than the ED. She has a PCP, psychiatrist, therapist, and  whom she all works with regularly and trusts. Pt has a care plan at her , and one in place at [facility] ED:    The pt has become increasingly disruptive in the Emergency Department.  The pt will yell, demand, and scream and disrupt the milieu, and this happens before she finds out the recommendation to send her back to her group home.  Today, the pt postured toward the ED doctor and threatened to  punch him.   During one of her ED visits over the past week, the pt befriended another patient who is  waiting for an inpatient bed.  The pt has been texting and contacting this patient.  There is concern that one of the reasons the pt is coming to the ED is to interact with this patient.        An Emergency Department Care Plan is being sought in order to reduce and extinguish the pt s numerous ED visits.  The pt does have an active outpatient team that the pt can utilize for support and lives in a group home with 24/7 staffing.  Typically, the pt calls 911, not group home staff.  Additionally, [facility] have attempted to recommend additionally services, such as day treatment, and the pt refuses the recommendations.  It appears that the pt comes to the ED, requesting admission as she does not like her group home.        To that end, it is recommended that if the pt returns to the Emergency Department, she be triaged and if there is not any acute new symptoms, both medically and mental health-wise, the group home be called and informed that the pt is returning and medical transport be set up for her return.  If the pt becomes dysregulated, the pt should be offered/given appropriate medications.  This should not hinder her return to her group home.        9/27/22, Nuria Deleon [facility]; Dr. Luis De Anda; Meche Butts [facility]     Collateral Information    The following information was received from Arlene whose relationship to the patient is  staff member. Information was obtained via phone. Their phone number is [phone] and they last had contact with patient on today 2/14/23.     What happened today: Nothing new, I was told she ate a lot and then starting plotting to come to the hospital complaining of stomach pain. No concerns with her returning, and thinks it is best she return.      What is different about patient's functioning: Nothing, pt's presentation consistent with previous visits.      Concern about alcohol/drug use: No     What do you think the patient needs: Return to      Has patient made comments  about wanting to kill themselves/others:  Yes, Pt often makes threats to hurt themself, but does not follow through on these threats.     If d/c is recommended, can they take part in safety/aftercare planning: Yes, pt has safety plan in place at      Risk Assessment    [place] Suicide Severity Rating Scale Since Last Contact: no risk indicated 2/14/23  Suicidal Ideation (Since Last Contact)  1. Wish to be Dead (Since Last Contact): No  2. Non-Specific Active Suicidal Thoughts (Since Last Contact): No  3. Active Suicidal Ideation with any Methods (Not Plan) Without Intent to Act (Since Last Contact): No  4. Active Suicidal Ideation with Some Intent to Act, Without Specific Plan (Since Last Contact): No  5. Active Suicidal Ideation with Specific Plan and Intent (Since Last Contact): No  Suicidal Behavior (Since Last Contact)  Actual Attempt (Since Last Contact): No  Has subject engaged in non-suicidal self-injurious behavior? (Since Last Contact): Yes  Interrupted Attempts (Since Last Contact): No  Aborted or Self-Interrupted Attempt (Since Last Contact): No  Preparatory Acts or Behavior (Since Last Contact): No  Suicide (Since Last Contact): No  Actual/Potential Lethality (Most Lethal Attempt)  Most Lethal Attempt Date:  (unknown)  Actual Lethality/Medical Damage Code (Most Lethal Attempt):  (unknown)  C-SSRS Risk (Since Last Contact)  Calculated C-SSRS Risk Score (Since Last Contact): No Risk Indicated    Validity of evaluation is not impacted by presenting factors during interview.   Comments regarding subjective versus objective responses to Brookline tool: none. See clinical narrative.   Environmental or Psychosocial Events: loss of a loved one, helplessness/hopelessness, impulsivity/recklessness and anniversary of traumatizing events  Chronic Risk Factors: history of psychiatric hospitalization, serious, persistent mental illness, personality disorders and history of Non-Suicidal Self Injury (NSSI)   Warning  Signs: hopelessness, anxiety, agitation, unable to sleep, sleeping all the time and recent discharges from emergency department or inpatient psychiatric care  Protective Factors: strong bond to family unit, community support, or employment, lives in a responsibly safe and stable environment, good treatment engagement, sense of importance of health and wellness, able to access care without barriers, supportive ongoing medical and mental health care relationships and help seeking  Interpretation of Risk Scoring, Risk Mitigation Interventions and Safety Plan:  Pt is assessed to be of no risk of harm to herself at the time of assessment based on the columbia screening. Pt is often suicidal at her baseline, but denies SI at this time. She is able to engage in safety planning and complains primarily of physical ailments due to a cold at this time.        Does the patient have thoughts of harming others? No     Is the patient engaging in sexually inappropriate behavior?  no        Current Substance Abuse     Is there recent substance abuse? no     Was a urine drug screen or blood alcohol level obtained: No       Mental Status Exam     Affect: Appropriate   Appearance: Appropriate    Attention Span/Concentration: Attentive  Eye Contact: Engaged   Fund of Knowledge: Delayed    Language /Speech Content: Fluent   Language /Speech Volume: Normal    Language /Speech Rate/Productions: Minimally Responsive    Recent Memory: Poor   Remote Memory: Poor   Mood: Anxious    Orientation to Person: Yes    Orientation to Place: Yes   Orientation to Time of Day: Yes    Orientation to Date: Yes    Situation (Do they understand why they are here?): Yes    Psychomotor Behavior: Normal    Thought Content: Clear   Thought Form: Intact      History of commitment: No     Medication    Psychotropic medications: Yes. Pt is currently taking   No current facility-administered medications for this encounter.          Current Outpatient Medications    Medication     acetaminophen (TYLENOL) 325 MG tablet     alum & mag hydroxide-simethicone (MAALOX  ES) 400-400-40 MG/5ML SUSP suspension     ARIPiprazole lauroxil ER (ARISTADA) 882 MG/3.2ML intra-muscular     benzonatate (TESSALON) 200 MG capsule     benztropine (COGENTIN) 1 MG tablet     calcium carbonate (TUMS) 500 MG chewable tablet     clobetasol (TEMOVATE) 0.05 % CREA cream     cyclobenzaprine (FLEXERIL) 10 MG tablet     diclofenac (VOLTAREN) 1 % topical gel     docusate sodium (COLACE) 50 MG capsule     fluticasone (FLONASE) 50 MCG/ACT nasal spray     guaiFENesin (MUCINEX) 600 MG 12 hr tablet     hydrocortisone (CORTAID) 0.5 % external cream     hydrOXYzine (ATARAX) 50 MG tablet     hyoscyamine (LEVSIN/SL) 0.125 MG sublingual tablet     ibuprofen (ADVIL/MOTRIN) 400 MG tablet     loperamide (IMODIUM) 2 MG capsule     LORazepam (ATIVAN) 0.5 MG tablet     multivitamin, therapeutic (THERA-VIT) TABS tablet     OLANZapine zydis (ZYPREXA) 5 MG ODT     omeprazole (PRILOSEC) 40 MG DR capsule     ondansetron (ZOFRAN) 4 MG tablet     polyethylene glycol (MIRALAX) 17 GM/Dose powder     prochlorperazine (COMPAZINE) 10 MG tablet     promethazine (PHENERGAN) 12.5 MG tablet     sennosides (SENOKOT) 8.6 MG tablet     traZODone (DESYREL) 50 MG tablet     venlafaxine (EFFEXOR-ER) 225 MG 24 hr tablet     Vitamin D, Cholecalciferol, 25 MCG (1000 UT) TABS   Medication compliant: Yes. Recent medication changes: No  Medication changes made in the last two weeks: No     Current Care Team    Primary Care Provider: Jess Wilkins MD - [facility]  Psychiatrist: Emma Jacobson NP - [facility]  Therapist: Rd Barton MA, [facility] - [facility], and is starting trauma therapy with Lynne will.  : Yes  Other: Group home.     Diagnosis    1 Borderline personality disorder F60.3 - Primary, By History       2 Unspecified intellectual disability (intellectual developmental disorder) F79 - By History        3 Bipolar I disorder, Current or  most recent episode depressed, Unspecified F31.9 - By History    Clinical Summary and Substantiation of Recommendations    After therapeutic assessment, intervention and aftercare planning by ED care team and [facility] and in consultation with attending provider, the patient's circumstances and mental state were appropriate for outpatient management. It is the recommendation of this clinician that pt discharge with OP MH support. A this time the pt is not presenting as an acute risk to self or others due to the following factors: pt denies SI, HI, hallucinations, delusions or substance use. Pt has established outpatient providers and 24/7 group home staffing. Recommend pt return to her group home this evening and utilize her current outpatient supports.  staff agree it is best for pt to return this evening and are ready to accept her back anytime.   Disposition    Recommended disposition: Group Home: return to group home       Reviewed case and recommendations with attending provider. Attending Name: Dr. Sena     Attending concurs with disposition: Yes       Patient concurs with disposition: Yes       Guardian concurs with disposition: NA      Final disposition: Group home: Return to Group Home .     Outpatient Details (if applicable):   Aftercare plan and appointments placed in the AVS and provided to patient: Yes. Given to patient by ED Nursing Staff    Assessment Details    Patient interview started at: 7:20 pm and completed at: 7:40 pm.     Total duration spent on the patient case in minutes: .50 hrs      CPT code(s) utilized: 95466 - Psychotherapy for Crisis - 60 (30-74*) min       CHERI Brown, Psychotherapist  [facility] - Triage & Transition Services  Callback: [phone]      Aftercare Plan  If I am feeling unsafe or I am in a crisis, I will:   Contact my established care providers   Call the National Suicide Prevention Lifeline: 988  Go to the nearest emergency room   Call 911      Things I am able to do  "on my own or with others to cope or help me feel better: listen to music, practice deep breathing, talk to peers, family or group home staff, practice yoga or meditation, go for a walk with a loved one     Changes I can make to support my mental health and wellness: adhere to treatment recommendations, take medication as prescribed, talk to group home staff, continue following up with current outpatient providers     People in my life that I can ask for help: mom, group home staff, outpatient supports     Your [facility] has a mental health crisis team you can call 24/7: [facility] Mobile Crisis  [phone]      Crisis Lines  Crisis Text Line  Text 359403  You will be connected with a trained live crisis counselor to provide support.     Por major, texto  SIRENA a 475588 o texto a 442-AYUDAME en WhatsApp     The [employer] (LGBTQ Youth Crisis Line)  8.104.885.7903  text START to 410-000        Community Resources  Fast Tracker  Linking people to mental health and substance use disorder resources  Critical Diagnostics.CloudCrowd      [place] Mental Health Warm Line  Peer to peer support  Monday thru Saturday, 12 pm to 10 pm  163.129.3463 or 5.499.364.7809  Text \"Support\" to 62937     National [place] on Mental Illness (MAGY)  158.106.1208 or 1.888.MAGY.HELPS        Mental Health Apps  My3  https://my3app.org/     VirtualHopeBox  https://[employer].org/apps/virtual-hope-box/        Additional Information  Today you were seen by a licensed mental health professional through Triage and Transition services, [facility] ([facility])  for a crisis assessment in the Emergency Department at [facility].  It is recommended that you follow up with your established providers (psychiatrist, mental health therapist, and/or primary care doctor - as relevant) as soon as possible. Coordinators from P will be calling you in the next 24-48 hours to ensure that you have the resources you need.  You can " also contact [facility] coordinators directly at [phone]. You may have been scheduled for or offered an appointment with a mental health provider. [facility] maintains an extensive network of licensed behavioral health providers to connect patients with the services they need.  We do not charge providers a fee to participate in our referral network.  We match patients with providers based on a patient's specific needs, insurance coverage, and location.  Our first effort will be to refer you to a provider within your care system, and will utilize providers outside your care system as needed.

## 2023-02-17 ENCOUNTER — NURSE TRIAGE (OUTPATIENT)
Dept: NURSING | Facility: CLINIC | Age: 23
End: 2023-02-17
Payer: MEDICARE

## 2023-02-17 ENCOUNTER — HOSPITAL ENCOUNTER (EMERGENCY)
Facility: CLINIC | Age: 23
Discharge: NURSING, REHAB OR LONG-TERM CARE | End: 2023-02-17
Attending: EMERGENCY MEDICINE | Admitting: EMERGENCY MEDICINE
Payer: MEDICARE

## 2023-02-17 VITALS
OXYGEN SATURATION: 98 % | RESPIRATION RATE: 16 BRPM | SYSTOLIC BLOOD PRESSURE: 131 MMHG | TEMPERATURE: 98.7 F | DIASTOLIC BLOOD PRESSURE: 72 MMHG | HEART RATE: 80 BPM

## 2023-02-17 DIAGNOSIS — F43.0 ACUTE REACTION TO STRESS: ICD-10-CM

## 2023-02-17 DIAGNOSIS — F79 INTELLECTUAL DISABILITY: ICD-10-CM

## 2023-02-17 DIAGNOSIS — F60.3 BORDERLINE PERSONALITY DISORDER (H): ICD-10-CM

## 2023-02-17 PROCEDURE — 99284 EMERGENCY DEPT VISIT MOD MDM: CPT | Performed by: EMERGENCY MEDICINE

## 2023-02-17 PROCEDURE — 250N000013 HC RX MED GY IP 250 OP 250 PS 637: Performed by: EMERGENCY MEDICINE

## 2023-02-17 PROCEDURE — 99285 EMERGENCY DEPT VISIT HI MDM: CPT | Performed by: EMERGENCY MEDICINE

## 2023-02-17 RX ORDER — OLANZAPINE 10 MG/1
10 TABLET, ORALLY DISINTEGRATING ORAL ONCE
Status: COMPLETED | OUTPATIENT
Start: 2023-02-17 | End: 2023-02-17

## 2023-02-17 RX ADMIN — OLANZAPINE 10 MG: 10 TABLET, ORALLY DISINTEGRATING ORAL at 15:44

## 2023-02-17 ASSESSMENT — ACTIVITIES OF DAILY LIVING (ADL)
ADLS_ACUITY_SCORE: 37

## 2023-02-17 NOTE — TELEPHONE ENCOUNTER
Nurse Triage SBAR    Situation:   -Wants to be dead    Background:   -patient calling  -she is at the group home in her room     Assessment:   -want to fall to sleep and not wake up  -felt pain when waking up (physical and emotional)  -feel confused  -plan to 'hit' or beat self to death  -contacting for safety until EMS arrival  -states she will call 911 on her own  -no access to weapons  -patient dose not want to say on the phone with the RN    Recommendation:   Call  now  -FNA called 911 on behalf of the patient and updated dispatch    GLORIA ARROYO RN on 2/17/2023 at 2:38 PM     Reason for Disposition    Patient is threatening suicide now    Difficult to awaken or acting very confused (disoriented, slurred speech) and of new-onset    Additional Information    Negative: Patient attempted suicide    Negative: Violent behavior, or threatening to physically hurt or kill someone    Negative: Patient is very confused (disoriented, slurred speech) and no other adult (e.g., friend or family member) available    Negative: Sounds like a life-threatening emergency to the triager    Protocols used: SUICIDE [id number]

## 2023-02-17 NOTE — ED PROVIDER NOTES
[facility] [place]    2/17/23      ED PROVIDER NOTE    History     Chief Complaint   Patient presents with     Suicidal     Suicidal, no plan, anniversary of her father's death.     HPI  Sadaf Ross is a 22 year old female PMH of borderline personality disorder, intellectual disability, and bipolar 1who presents with increased depression and grief in setting of the anniversary of her father's death.  She was seen in the emergency department 3 days ago.  Her father passed 2 years ago and she misses him.  She says she doesn't want to talk about it but instead wants to keep it all inside and explode.  She lives at a group home and likes the staff there.  She called 911 herself today.  She says she is suicidal.  Staff at the group home says she is suicidal every day.  They say she was at her baseline today when she came to the ED.     Pt has 24/7 group home staffing who try to support the patient and encourage her to talk to them or utilize other resources other than the ED. She has a PCP, psychiatrist, therapist, and  whom she all works with regularly and trusts. Pt has a care plan at her , and one in place at [facility] ED.      patient declined to talk about it     patient declined to talk about it    Physical Exam   BP: 122/86  Pulse: 88  Temp: 98.7  F (37.1  C)  Resp: 16  SpO2: 97 %  Physical Exam  Vitals and nursing note reviewed.   Constitutional:       Appearance: She is obese.   HENT:      Head: Normocephalic and atraumatic.      Nose: No congestion or rhinorrhea.   Eyes:      Extraocular Movements: Extraocular movements intact.   Cardiovascular:      Rate and Rhythm: Normal rate.   Pulmonary:      Effort: Pulmonary effort is normal.   Musculoskeletal:         General: No signs of injury.      Cervical back: Normal range of motion.   Skin:     Coloration: Skin is not jaundiced or pale.   Neurological:      General: No focal deficit present.      Mental Status:  She is alert. Mental status is at baseline.   Psychiatric:         Attention and Perception: Attention and perception normal.         Mood and Affect: Tearful: tearful once when briefly talking about her father but then declined to talk further.         Speech: She is noncommunicative.         Behavior: Behavior normal.         Thought Content: Thought content is not paranoid or delusional. Thought content includes suicidal ideation. Thought content does not include homicidal ideation. Thought content does not include homicidal or suicidal plan.         Cognition and Memory: Cognition and memory normal.         Judgment: Judgment is impulsive and inappropriate.           ED Course, Procedures, & Data      Procedures         Mental Health Risk Assessment      PSS-3    Date and Time Over the past 2 weeks have you felt down, depressed, or hopeless? Over the past 2 weeks have you had thoughts of killing yourself? Have you ever attempted to kill yourself? When did this last happen? User   02/17/23 1537 yes yes yes -- SLM      C-SSRS ([place])    Date and Time Q1 Wished to be Dead (Past Month) Q2 Suicidal Thoughts (Past Month) Q3 Suicidal Thought Method Q4 Suicidal Intent without Specific Plan Q5 Suicide Intent with Specific Plan Q6 Suicide Behavior (Lifetime) Within the Past 3 Months? RETIRED: Level of Risk per Screen Screening Not Complete User   02/17/23 1537 yes yes yes no yes yes -- -- -- SLM                     No results found for any visits on 02/17/23.  Medications   OLANZapine zydis (zyPREXA) ODT tab 10 mg (10 mg Oral Given 2/17/23 2087)     Labs Ordered and Resulted from Time of ED Arrival to Time of ED Departure - No data to display  No orders to display          Medical Decision Making  The patient's presentation is strongly suggestive of a stable chronic illness.    The patient's evaluation involved:  an assessment requiring an independent historian (see separate area of note for details)  review of  external note(s) from 3+ sources (see separate area of note for details)    The patient's management involved a decision regarding admission.       Assessment & Plan    Sadaf Ross is a 22 year old female PMH of borderline personality disorder, intellectual disability, and bipolar 1who presents with increased depression and grief in setting of the anniversary of her father's death.  Patient has daily suicidal thoughts and has them again today.  Staff at [facility] says she was at her baseline prior to coming to the ED.  the patient comes to the ER frequently and presents in her typical state.  She was seen by myself and given zyprexa 10 mg po.  She was observed and slept.  I spoke to [facility] staff who is supportive of the patient and is comfortable with patient being discharged home.      I have reviewed the nursing notes. I have reviewed the findings, diagnosis, plan and need for follow up with the patient.    New Prescriptions    No medications on file       Final diagnoses:   Borderline personality disorder (H)   Acute reaction to stress   Intellectual disability       Ashely Toth MD  [facility] EMERGENCY DEPARTMENT  2/17/2023     Ashely Toth MD  02/17/23 1954

## 2023-02-17 NOTE — ED TRIAGE NOTES
Suicidal with a plan to beat herself up, anniversary of her dad's death     Triage Assessment     Row Name 02/17/23 1537       Triage Assessment (Adult)    Airway WDL WDL       Respiratory WDL    Respiratory WDL WDL       Skin Circulation/Temperature WDL    Skin Circulation/Temperature WDL WDL       Cardiac WDL    Cardiac WDL WDL       Peripheral/Neurovascular WDL    Peripheral Neurovascular WDL WDL       Cognitive/Neuro/Behavioral WDL    Cognitive/Neuro/Behavioral WDL WDL

## 2023-02-17 NOTE — ED NOTES
Bed: URE-D  Expected date: 2/17/23  Expected time: 3:15 PM  Means of arrival:   Comments:  N 041 22F SI

## 2023-02-18 NOTE — ED NOTES
Pt in the HW continues to have a mood labile. After MD talked to the patient, pt got up from the chair and tried to leave ED. Behavior was redirected. After this pt verbalized to her sitter that she is still suicidal, telling her sitter that she will buy a gun when she leaves the hospital. Per sitter pt called her group home and told staff that when she gets back she will kill herself. There was a short stint of pt banging her head on the wal but vicky was able to redirect pt's behavior

## 2023-02-18 NOTE — ED NOTES
Group Home staff EZEQUIEL Jacobs called [phone] and updated that ambulance is here to transport pt back.  Pt left on stable condition. All belongings sent with pt

## 2023-02-18 NOTE — ED NOTES
[facility] RN was updated that pt is being sent to back to the [facility].   Ambulance has been set up.

## 2023-02-18 NOTE — DISCHARGE INSTRUCTIONS
Return to your group home via ambulance and continue working with current outpatient providers/support.

## 2023-02-22 ENCOUNTER — NURSE TRIAGE (OUTPATIENT)
Dept: NURSING | Facility: CLINIC | Age: 23
End: 2023-02-22
Payer: MEDICARE

## 2023-02-22 NOTE — TELEPHONE ENCOUNTER
"Patient calling reporting \"rib cage pain\" waking from sleep at 5 a.m. this morning.    Reporting constant pain upper chest radiating into back.     Rating pain \"9\" increases with movement and deep breath.    Ibuprofen 800 mg taken around 130 pm today with no improvement..    Reporting feeling dizziness, able to ambulate on her own.    Denies injury.     Stating she is a smoker.    Disposition per triage 911 disposition. Patient stating she is able to call 911 now.    Caller verbalized understanding. Denies further questions.    Kristina Spaulding RN  [facility]        Reason for Disposition    Chest pain lasting longer than 5 minutes and ANY of the following:* Over 44 years old* Over 30 years old and at least one cardiac risk factor (e.g., diabetes mellitus, high blood pressure, high cholesterol, smoker, or strong family history of heart disease)* History of heart disease (i.e., angina, heart attack, heart failure, bypass surgery, takes nitroglycerin)* Pain is crushing, pressure-like, or heavy    Additional Information    Negative: SEVERE difficulty breathing (e.g., struggling for each breath, speaks in single words)    Negative: Passed out (i.e., fainted, collapsed and was not responding)    Negative: Difficult to awaken or acting confused (e.g., disoriented, slurred speech)    Negative: Shock suspected (e.g., cold/pale/clammy skin, too weak to stand, low BP, rapid pulse)    Protocols used: CHEST PAIN-A-OH      "

## 2023-02-27 ENCOUNTER — HOSPITAL ENCOUNTER (EMERGENCY)
Facility: CLINIC | Age: 23
Discharge: HOME | End: 2023-02-27
Attending: PSYCHIATRY & NEUROLOGY | Admitting: PSYCHIATRY & NEUROLOGY
Payer: MEDICARE

## 2023-02-27 VITALS
WEIGHT: 237 LBS | BODY MASS INDEX: 40.68 KG/M2 | HEART RATE: 88 BPM | TEMPERATURE: 98.4 F | OXYGEN SATURATION: 98 % | DIASTOLIC BLOOD PRESSURE: 86 MMHG | RESPIRATION RATE: 14 BRPM | SYSTOLIC BLOOD PRESSURE: 126 MMHG

## 2023-02-27 DIAGNOSIS — Z79.899 NEED FOR PROPHYLACTIC CHEMOTHERAPY: ICD-10-CM

## 2023-02-27 DIAGNOSIS — R45.851 SUICIDAL IDEATION: ICD-10-CM

## 2023-02-27 DIAGNOSIS — F31.9 BIPOLAR DISORDER, UNSPECIFIED (H): ICD-10-CM

## 2023-02-27 DIAGNOSIS — F43.0 PREDOMINANT DISTURBANCE OF CONSCIOUSNESS AS REACTION TO STRESS: ICD-10-CM

## 2023-02-27 DIAGNOSIS — F79 UNSPECIFIED INTELLECTUAL DISABILITIES: ICD-10-CM

## 2023-02-27 DIAGNOSIS — F60.3 EXPLOSIVE PERSONALITY DISORDER (H): ICD-10-CM

## 2023-02-27 DIAGNOSIS — F79 INTELLECTUAL DISABILITY: ICD-10-CM

## 2023-02-27 DIAGNOSIS — F43.0 ACUTE REACTION TO STRESS: ICD-10-CM

## 2023-02-27 PROCEDURE — 90791 PSYCH DIAGNOSTIC EVALUATION: CPT

## 2023-02-27 PROCEDURE — 99285 EMERGENCY DEPT VISIT HI MDM: CPT | Mod: 25

## 2023-02-27 PROCEDURE — 99285 EMERGENCY DEPT VISIT HI MDM: CPT | Performed by: PSYCHIATRY & NEUROLOGY

## 2023-02-27 RX ORDER — OLANZAPINE 10 MG/1
10 TABLET, ORALLY DISINTEGRATING ORAL ONCE
Status: DISCONTINUED | OUTPATIENT
Start: 2023-02-27 | End: 2023-02-27 | Stop reason: HOSPADM

## 2023-02-27 ASSESSMENT — COLUMBIA-SUICIDE SEVERITY RATING SCALE - C-SSRS
5. HAVE YOU STARTED TO WORK OUT OR WORKED OUT THE DETAILS OF HOW TO KILL YOURSELF? DO YOU INTEND TO CARRY OUT THIS PLAN?: YES
6. HAVE YOU EVER DONE ANYTHING, STARTED TO DO ANYTHING, OR PREPARED TO DO ANYTHING TO END YOUR LIFE?: NO
ATTEMPT SINCE LAST CONTACT: NO
REASONS FOR IDEATION SINCE LAST CONTACT: COMPLETELY TO GET ATTENTION, REVENGE, OR A REACTION FROM OTHERS
SUICIDE, SINCE LAST CONTACT: NO
TOTAL  NUMBER OF ABORTED OR SELF INTERRUPTED ATTEMPTS SINCE LAST CONTACT: NO
2. HAVE YOU ACTUALLY HAD ANY THOUGHTS OF KILLING YOURSELF?: YES
TOTAL  NUMBER OF INTERRUPTED ATTEMPTS SINCE LAST CONTACT: NO
1. SINCE LAST CONTACT, HAVE YOU WISHED YOU WERE DEAD OR WISHED YOU COULD GO TO SLEEP AND NOT WAKE UP?: YES

## 2023-02-27 ASSESSMENT — ACTIVITIES OF DAILY LIVING (ADL): ADLS_ACUITY_SCORE: 37

## 2023-02-27 NOTE — ED TRIAGE NOTES
Triage Assessment     Row Name 02/27/23 6044       Triage Assessment (Adult)    Airway WDL WDL       Respiratory WDL    Respiratory WDL WDL       Skin Circulation/Temperature WDL    Skin Circulation/Temperature WDL WDL       Cardiac WDL    Cardiac WDL WDL       Peripheral/Neurovascular WDL    Peripheral Neurovascular WDL WDL       Cognitive/Neuro/Behavioral WDL    Cognitive/Neuro/Behavioral WDL WDL

## 2023-02-28 NOTE — DISCHARGE INSTRUCTIONS
Follow-up established care and services  Aftercare Plan  If I am feeling unsafe or I am in a crisis, I will:   Contact my established care providers   Call the National Suicide Prevention Lifeline: 481.887.4820   Go to the nearest emergency room   Call 911     Warning signs that I or other people might notice when a crisis is developing for me:     I am having increasing suicidal thoughts that turn to plans with intent or means  I am having additional urges to self-harm    My emotions are of hopelessness; feeling like there's no way out.  Rage or anger.  Engaging in risky activities without thinking  Withdrawing from family/friends  Dramatic mood swings  Drastic personality changes   Use of alcohol or drugs  Postings on social media  Neglect of personal hygiene or cares     Things I am able to do on my own to cope or help me feel better:    Spending quality time with loved ones  Staying hydrated  Eating balanced meals  Going for a walk every day  Take care of daily responsibilities/needs  Focus on positive self-talk vs negative self-talk    Things that I am able to do with others to cope or help me better:   Exercise  Music  Deep breathing  Meditations  Journal  Self-regulate  Self check-in  Ask for help    Things I can use or do for distraction:   Reach out to/spend time with family, friends  Shower  Exercise  Chores or do a project  Listen to music  Watch movie/TV  Listening to music  Journaling  Reading a book  Meditating  Call a friend    Changes I can make to support my mental health and wellness:    -I will abstain from all mood altering chemicals not currently prescribed to me   -I will attend scheduled mental health therapy and psychiatric appointments and follow all   recommendations  -I will commit to 30 minutes of self care daily - this can be as simple as taking a shower, going for a   walk, cooking a meal, read, writing, etc  -I will practice square breathing when I begin to feel anxious - in breath  through the nose for the count   of 4 and the first line on the square. Out breath through the mouth for the count of 4 for the second line   of the square. Repeat to complete the square. Repeat the square as many times as needed.  - I will use distraction skills of: going for walks, watching TV, spending time outside, calling a friend or   family member  -Use community resources, including hotline numbers, [facility] crisis and support meetings  -Maintain a daily schedule/routine  -Practice deep breathing skills  -Download a meditation kervin and spend 15-20 minutes per day mediating/relaxing. Some apps to   download include: Calm, Headspace and Insight Timer. All 3 of these apps have free version    Reduce Extreme Emotion  QUICKLY:  Changing Your Body Chemistry      T:  Change your body Temperature to change your autonomic nervous system   Use Ice Water to calm yourself down FAST   Put your face in a bowl of ice water (this is the best way; have the person keep his/her face in ice water for 30-45 seconds - initial research is showing that the longer s/he can hold her/his face in the water, the better the response), or   Splash ice water on your face, or hold an ice pack on your face      I:  Intensely exercise to calm down a body revved up by emotion   Examples: running, walking fast, jumping, playing basketball, weight lifting, swimming, calisthenics, etc.   Engage in exercises that DO NOT include violent behaviors. Exercises that utilize violent behaviors tend to function as  behavioral rehearsal,  and rather than calming the person down, may actually  rev  the person up more, increasing the likelihood of violence, and lessening the likelihood that they will  burn off  energy     P:  Progressively relax your muscles   Starting with your hands, moving to your forearms, upper arms, shoulders, neck, forehead, eyes, cheeks and lips, tongue and teeth, chest, upper back, stomach, buttocks, thighs, calves, ankles, feet    Tense (10 seconds,   of the way), then relax each muscle (all the way)   Notice the tension   Notice the difference when relaxed (by tensing first, and then relaxing, you are able to get a more thorough relaxation than by simply relaxing)      P: Paced breathing to relax   The standard technique is to begin with counting the number of steps one takes for a typical inhale, then counting the steps one takes for a typical exhale, and then lengthening the amount of steps for the exhalation by one or two steps.  OR  Repeat this pattern for 1-2 minutes  Inhale for four (4) seconds   Exhale for six (6) to eight (8) seconds   Research demonstrated that one can change one's overall level of anxiety by doing this exercise for even a few minutes per day      People in my life that I can ask for help:   Family  Friends  Providers    Your [facility] has a mental health crisis team you can call 24/7:   [facility] Crisis Line Number: [phone]  [facility] Mental Health Crisis: [phone] - Call the crisis line for immediate mental health support, 24 hours a day.   [facility] Crisis Line Number: [phone]  [facility] Crisis Line Number: [phone]  [facility] Crisis Line Number: [phone]   [facility] Crisis Line Number: [phone]  [place]: [phone]  [place]: [phone]  [facility] Crisis Number: [phone]  [facility] Crisis: [phone]      Other things that are important when I'm in crisis:   Ask for help    Additional resources and information:     Mental Health Apps  My3  https://my3app.org/    VirtualHopeBox  https://[employer].org/apps/virtual-hope-box/       Professionals or Agencies I Can Contact During A Crisis:       Crisis Lines  Crisis Text Line  Text 816422  You will be connected with a trained live crisis counselor to provide support.    [employer] (LGBTQ Youth Crisis Line)  2.607.133.6075  text START to  "116-196    National [place] on Mental Illness (MAGY)  714.300.9581 or 5.888.MAGY.HELPS    National Suicide Prevention Lifeline at 8-059-813-ANLM (1258)     Throughout  [place]: call **CRISIS (**245348)     Crisis Text Line: is available for free, 24/7 by texting [place] to 142611    Community Resources  Fast Tracker  Linking people to mental health and substance use disorder resources  ActualMeds[employer]     [place] Mental Health Warm Line  Peer to peer support  Monday thru Saturday, 12 pm to 10 pm  625.424.2308 or 8.344.699.4725  Text \"Support\" to 99142     National [place] on Mental Illness (www.[place].magy.org): 201-018-3151 or 819-366-6825     [facility] Phone (free remote counseling): 947.376.3143 Web address:   https://[employer].org/     www.[employer] (filter for insurance, gender preference, etc.)    [facility]   [phone]  Intake appointment will be virtual, following appointments can be in person or virtual.   **IMMEDIATE OPENINGS**    [facility]  [phone]  *offers individual therapy, medication management and Mental Health Case Workers; can self refer    [facility]  [phone]  *Immediate Openings    [facility]  [phone]  *Immediate Openings    [facility]  [phone]  *Immediate Openings    Please follow up with scheduled providers to ensure all necessary paperwork is filled out prior to your   scheduled telehealth appointments.     Coordinators from [facility] will be calling within two business days to ensure   that you have the resources you may need or provide assistance with scheduling (Phone number: [phone].).    Remember: give the referrals 3 sessions prior to calling it quits. Do you trust them? Do you feel   understood? Do you think they can help? Check in with yourself after each session    Please reach out to the Diagnostic Evaluation " [place]([phone]) regarding further mental health appointment needs for this emergency department visit.    [facility] SCHEDULING:  Today you were seen by a licensed mental health professional through [facility], [facility] ([facility])  for a crisis assessment in the Emergency Department at [facility].  It is recommended that you follow up with your estabished providers (psychiatrist, menta health therapist, and/or primary care doctor - as relevant) as soon as possible. Coordinators from [facility] will be calling you in the next 24-48 hours to ensure that you have the resources you need.  You can so contact [facility] coordinators directly at [phone].     [facility] maintains an extensive network of licensed behavioral health providers to connect patients with the services they need.  We do not charge providers a fee to participate in our referral network.  We match patients with providers based on a patient s specific needs, insurance coverage, and location.  Our first effort will be to refer you to a provider within your care system, and will utilize providers outside your care system as needed.

## 2023-02-28 NOTE — CONSULTS
"Diagnostic Evaluation Consultation  Crisis Assessment    Patient was assessed: In Person  [place] [facility] [place] [place]  Was a release of information signed: No. Reason: did not sign      Referral Data and Chief Complaint  Sadaf is a 22 year old, who uses she/her pronouns, and presents to the ED via EMS. Patient is referred to the ED by self. Patient is presenting to the ED for the following concerns: Pt presents for insomnia.      Informed Consent and Assessment Methods     Patient is her own guardian. Writer met with patient and explained the crisis assessment process, including applicable information disclosures and limits to confidentiality, assessed understanding of the process, and obtained consent to proceed with the assessment. Patient was observed to be able to participate in the assessment as evidenced by alert and oriented. Assessment methods included conducting a formal interview with patient, review of medical records, collaboration with medical staff, and obtaining relevant collateral information from family and community providers when available..     Over the course of this crisis assessment provided reassurance, offered validation, engaged patient in problem solving and disposition planning, worked with patient on safety and aftercare planning, assisted in processing patient's thoughts and feeling relating to having a flashback and provided psychoeducation. Patient's response to interventions was pt appears willing and open to interventions.     Summary of Patient Situation     Pt presents for insomnia.  Pt reports she has not been sleeping and has had poor appetite for the past 2 days. Pt reports she saw her primary care doctor today, and \"he couldn't figure it out, so I called for here\". Pt reports current suicide ideation with plans to cut herself, however, does not have access to knives or other means to engage with this plan. Pt reports she had flashback of her father today, who told her to hurt " herself. Pt reports it is the anniversary of her father's death, which she continues to struggle with. Pt reports she feels no one will ever love her like he did and this makes her upset sometimes. Pt reports she often has flashbacks where she sees her dad, and this is considered baseline. Pt reports she has suicide ideation at baseline. Pt reports she feels safe to return to group home and is agreeable to safety planning. Pt reports she has maintained her extensive provider network, including case management, therapy, medication management, and group home involvement. Pt reports she maintains a safety plan at her group home. Pt presents as slightly anxious, alert, oriented, and engaged. Pt appears to be functioning at her baseline.    Brief Psychosocial History     Pt reports she lives at a group home with 24/7 staffing. Pt reports she is her own guardian. Patient reports her father passed away approximately 2 years ago and she continues to struggle with managing her grief from this. Pt reports she has a supportive family and established outpatient supports.     Significant Clinical History     Pt has a history of borderline personality disorder, intellectual disability, and bipolar. Per chart review, pt has a hx of multiple ED visits, with the most recent being on 2/17/23 for various chief complaints, seeking secondary gain from attention in the ED and not wanting to be at her group home. Pt has a hx of inpatient hospitalizations with the last one being on 11/21/2021 at [facility]. Pt has 24/7 group home staffing who try to support the patient and encourage her to talk to them or utilize other resources other than the ED. Pt has a PCP, psychiatrist, therapist, and  whom she all works with regularly and trusts. Pt has a care plan at her , and one in place at [facility] ED:        The pt has become increasingly disruptive in the Emergency Department.  The pt will yell, demand, and scream and disrupt the milieu,  "and this happens before she finds out the recommendation to send her back to her group home.  Today, the pt postured toward the ED doctor and threatened to  punch him.   During one of her ED visits over the past week, the pt befriended another patient who is waiting for an inpatient bed.  The pt has been texting and contacting this patient.  There is concern that one of the reasons the pt is coming to the ED is to interact with this patient.        An Emergency Department Care Plan is being sought in order to reduce and extinguish the pt s numerous ED visits.  The pt does have an active outpatient team that the pt can utilize for support and lives in a group home with 24/7 staffing.  Typically, the pt calls 911, not group home staff.  Additionally, [facility] have attempted to recommend additionally services, such as day treatment, and the pt refuses the recommendations.  It appears that the pt comes to the ED, requesting admission as she does not like her group home.        To that end, it is recommended that if the pt returns to the Emergency Department, she be triaged and if there is not any acute new symptoms, both medically and mental health-wise, the group home be called and informed that the pt is returning and medical transport be set up for her return.  If the pt becomes dysregulated, the pt should be offered/given appropriate medications.  This should not hinder her return to her group home.        9/27/22, Nuria Deleon [facility]; Dr. Luis De Anda; Meche Butts [facility]     Collateral Information  The following information was received from Arlene whose relationship to the patient is GH staff member. Information was obtained via phone. Their phone number is [phone] and they last had contact with patient on today.    What happened today: \"nothing new. She came up with stuff that she is not sleeping, but that's not a reason to go to the hospital. She called 911 yesterday and cussed out police and then ran and " "hid from them\"    What is different about patient's functioning: reports she has seen pt sleeping and eating normally. Reports pt presentation is consistent with previous visits    Concern about alcohol/drug use: No    What do you think the patient needs: return back to group.    Has patient made comments about wanting to kill themselves/others:  Yes reports pt often make suicidal threats and does not follow through with the threats    If d/c is recommended, can they take part in safety/aftercare planning: Yes yes, pt has a safety plan with group home    Other information: confirms pt does not have access to means to harm herself in group home           Risk Assessment  [place] Suicide Severity Rating Scale Since Last Contact: 2/27/2023  Suicidal Ideation (Since Last Contact)  1. Wish to be Dead (Since Last Contact): Yes  2. Non-Specific Active Suicidal Thoughts (Since Last Contact): Yes  3. Active Suicidal Ideation with any Methods (Not Plan) Without Intent to Act (Since Last Contact): Yes  4. Active Suicidal Ideation with Some Intent to Act, Without Specific Plan (Since Last Contact): Yes  5. Active Suicidal Ideation with Specific Plan and Intent (Since Last Contact): Yes  Suicidal Behavior (Since Last Contact)  Actual Attempt (Since Last Contact): No  Has subject engaged in non-suicidal self-injurious behavior? (Since Last Contact): Yes  Interrupted Attempts (Since Last Contact): No  Aborted or Self-Interrupted Attempt (Since Last Contact): No  Preparatory Acts or Behavior (Since Last Contact): No  Suicide (Since Last Contact): No     C-SSRS Risk (Since Last Contact)  Calculated C-SSRS Risk Score (Since Last Contact): High Risk    Validity of evaluation is impacted by presenting factors during interview.   Comments regarding subjective versus objective responses to [place] tool: none. See clinical narrative  Environmental or Psychosocial Events: loss of a loved one, helplessness/hopelessness, " impulsivity/recklessness and anniversary of traumatizing events  Chronic Risk Factors: history of psychiatric hospitalization, serious, persistent mental illness, personality disorders and history of Non-Suicidal Self Injury (NSSI)   Warning Signs: hopelessness, anxiety, agitation, unable to sleep, sleeping all the time and recent discharges from emergency department or inpatient psychiatric care  Protective Factors: strong bond to family unit, community support, or employment, lives in a responsibly safe and stable environment, good treatment engagement, sense of importance of health and wellness, able to access care without barriers, supportive ongoing medical and mental health care relationships and help seeking  Interpretation of Risk Scoring, Risk Mitigation Interventions and Safety Plan:  Pt is suicidal at her baseline, does not have access to means to hurt herself or others. She is able to engage in safety planning and group home has a safety plan with her, including they are staffed 24/7 and are able to support pt.         Does the patient have thoughts of harming others? No     Is the patient engaging in sexually inappropriate behavior?  no        Current Substance Abuse     Is there recent substance abuse? no     Was a urine drug screen or blood alcohol level obtained: No       Mental Status Exam     Affect: Appropriate   Appearance: Appropriate    Attention Span/Concentration: Attentive  Eye Contact: Engaged   Fund of Knowledge: Delayed    Language /Speech Content: Fluent   Language /Speech Volume: Soft    Language /Speech Rate/Productions: Normal    Recent Memory: Poor   Remote Memory: Poor   Mood: Anxious    Orientation to Person: Yes    Orientation to Place: Yes   Orientation to Time of Day: Yes    Orientation to Date: Yes    Situation (Do they understand why they are here?): Yes    Psychomotor Behavior: Normal    Thought Content: Suicidal   Thought Form: Intact      History of commitment: No        Medication    Psychotropic medications: Yes. Pt is currently taking   No current facility-administered medications for this encounter.             Current Outpatient Medications   Medication     acetaminophen (TYLENOL) 325 MG tablet     alum & mag hydroxide-simethicone (MAALOX  ES) 400-400-40 MG/5ML SUSP suspension     ARIPiprazole lauroxil ER (ARISTADA) 882 MG/3.2ML intra-muscular     benzonatate (TESSALON) 200 MG capsule     benztropine (COGENTIN) 1 MG tablet     calcium carbonate (TUMS) 500 MG chewable tablet     clobetasol (TEMOVATE) 0.05 % CREA cream     cyclobenzaprine (FLEXERIL) 10 MG tablet     diclofenac (VOLTAREN) 1 % topical gel     docusate sodium (COLACE) 50 MG capsule     fluticasone (FLONASE) 50 MCG/ACT nasal spray     guaiFENesin (MUCINEX) 600 MG 12 hr tablet     hydrocortisone (CORTAID) 0.5 % external cream     hydrOXYzine (ATARAX) 50 MG tablet     hyoscyamine (LEVSIN/SL) 0.125 MG sublingual tablet     ibuprofen (ADVIL/MOTRIN) 400 MG tablet     loperamide (IMODIUM) 2 MG capsule     LORazepam (ATIVAN) 0.5 MG tablet     multivitamin, therapeutic (THERA-VIT) TABS tablet     OLANZapine zydis (ZYPREXA) 5 MG ODT     omeprazole (PRILOSEC) 40 MG DR capsule     ondansetron (ZOFRAN) 4 MG tablet     polyethylene glycol (MIRALAX) 17 GM/Dose powder     prochlorperazine (COMPAZINE) 10 MG tablet     promethazine (PHENERGAN) 12.5 MG tablet     sennosides (SENOKOT) 8.6 MG tablet     traZODone (DESYREL) 50 MG tablet     venlafaxine (EFFEXOR-ER) 225 MG 24 hr tablet     Vitamin D, Cholecalciferol, 25 MCG (1000 UT) TABS   Medication compliant: Yes. Recent medication changes: No  Medication changes made in the last two weeks: No         Current Care Team    Primary Care Provider: Jess Wilkins MD - [facility]  Psychiatrist: Emma Jacobson NP - [facility]  Therapist: Rd Barton MA, [facility] - [facility], and is starting trauma therapy with Lynne will.  : Yes  Other: Group home.      Diagnosis    301.83 (F60.3)  "Borderline Personality Disorder   Intellectual Disabilites  319 (F79) Unspecified Intellectual Disability (Intellectual Developmental Disorder) - by history   296.50 Bipolar I Disorder Current or Most Recent Episode Depressed, unspecified - by history     Clinical Summary and Substantiation of Recommendations    After therapeutic assessment, intervention and aftercare planning by ED care team and LM and in consultation with attending provider, the patient's circumstances and mental state were appropriate for outpatient management. It is the recommendation of this clinician that pt discharge with OP MH support. A this time the pt is not presenting as an acute risk to self or others due to the following factors: pt endorses SI with plan to cut herself and does not have access to means in her group home. Pt is agreeable to safety planning, has a safety plan at her group home, and the group home reports they are able to support pt, \"this is her normal, nothing is new. We have seen her eat and sleep, she is making that up\". Pt has established outpatient providers and the group home is staffed 24/7 and is in support of pt returning to group home. Pt is recommended to return to group home and continue with outpatient providers.        Disposition    Recommended disposition: Individual Therapy, Medication Management and Group Home: return to group home       Reviewed case and recommendations with attending provider. Attending Name: Dr. Sena       Attending concurs with disposition: Yes       Patient concurs with disposition: Yes       Guardian concurs with disposition: NA      Final disposition: Individual therapy , Medication management and Group home: return to group home .       Outpatient Details (if applicable):   Aftercare plan and appointments placed in the AVS and provided to patient: Yes. Given to patient by RN    Was lethal means counseling provided as a part of aftercare planning? No; pt does not have access " to means to harm herself in group home      Assessment Details    Patient interview started at: 6:48PM and completed at: 6:59PM.     Total duration spent on the patient case in minutes: .50 hrs      CPT code(s) utilized: 67818 - Psychotherapy for Crisis - 60 (30-74*) min       Zoë You, GINETTE, MS, LPCC, LADC, Psychotherapist  [facility] - [facility]  Callback: [phone]      Aftercare Plan  If I am feeling unsafe or I am in a crisis, I will:   Contact my established care providers   Call the [place] Suicide Prevention Lifeline: [phone]   Go to the nearest emergency room   Call [phone]     Warning signs that I or other people might notice when a crisis is developing for me:     I am having increasing suicidal thoughts that turn to plans with intent or means  I am having additional urges to self-harm    My emotions are of hopelessness; feeling like there's no way out.  Rage or anger.  Engaging in risky activities without thinking  Withdrawing from family/friends  Dramatic mood swings  Drastic personality changes   Use of alcohol or drugs  Postings on social media  Neglect of personal hygiene or cares     Things I am able to do on my own to cope or help me feel better:    Spending quality time with loved ones  Staying hydrated  Eating balanced meals  Going for a walk every day  Take care of daily responsibilities/needs  Focus on positive self-talk vs negative self-talk    Things that I am able to do with others to cope or help me better:   Exercise  Music  Deep breathing  Meditations  Journal  Self-regulate  Self check-in  Ask for help    Things I can use or do for distraction:   Reach out to/spend time with family, friends  Shower  Exercise  Chores or do a project  Listen to music  Watch movie/TV  Listening to music  Journaling  Reading a book  Meditating  Call a friend    Changes I can make to support my mental health and wellness:    -I will abstain from all mood altering chemicals not  currently prescribed to me   -I will attend scheduled mental health therapy and psychiatric appointments and follow all   recommendations  -I will commit to 30 minutes of self care daily - this can be as simple as taking a shower, going for a   walk, cooking a meal, read, writing, etc  -I will practice square breathing when I begin to feel anxious - in breath through the nose for the count   of 4 and the first line on the square. Out breath through the mouth for the count of 4 for the second line   of the square. Repeat to complete the square. Repeat the square as many times as needed.  - I will use distraction skills of: going for walks, watching TV, spending time outside, calling a friend or   family member  -Use community resources, including hotline numbers, [facility] crisis and support meetings  -Maintain a daily schedule/routine  -Practice deep breathing skills  -Download a meditation kervin and spend 15-20 minutes per day mediating/relaxing. Some apps to   download include: Calm, Headspace and Insight Timer. All 3 of these apps have free version    Reduce Extreme Emotion  QUICKLY:  Changing Your Body Chemistry      T:  Change your body Temperature to change your autonomic nervous system     Use Ice Water to calm yourself down FAST     Put your face in a bowl of ice water (this is the best way; have the person keep his/her face in ice water for 30-45 seconds - initial research is showing that the longer s/he can hold her/his face in the water, the better the response), or     Splash ice water on your face, or hold an ice pack on your face      I:  Intensely exercise to calm down a body revved up by emotion     Examples: running, walking fast, jumping, playing basketball, weight lifting, swimming, calisthenics, etc.     Engage in exercises that DO NOT include violent behaviors. Exercises that utilize violent behaviors tend to function as  behavioral rehearsal,  and rather than calming the person down, may actually   rev  the person up more, increasing the likelihood of violence, and lessening the likelihood that they will  burn off  energy     P:  Progressively relax your muscles     Starting with your hands, moving to your forearms, upper arms, shoulders, neck, forehead, eyes, cheeks and lips, tongue and teeth, chest, upper back, stomach, buttocks, thighs, calves, ankles, feet     Tense (10 seconds,   of the way), then relax each muscle (all the way)     Notice the tension     Notice the difference when relaxed (by tensing first, and then relaxing, you are able to get a more thorough relaxation than by simply relaxing)      P: Paced breathing to relax     The standard technique is to begin with counting the number of steps one takes for a typical inhale, then counting the steps one takes for a typical exhale, and then lengthening the amount of steps for the exhalation by one or two steps.  OR    Repeat this pattern for 1-2 minutes    Inhale for four (4) seconds     Exhale for six (6) to eight (8) seconds     Research demonstrated that one can change one's overall level of anxiety by doing this exercise for even a few minutes per day      People in my life that I can ask for help:   Family  Friends  Providers    Your Community Health has a mental health crisis team you can call 24/7:   [facility] Crisis Line Number: [phone]  [facility] Mental Health Crisis: [phone] - Call the crisis line for immediate mental health support, 24 hours a day.   [facility] Crisis Line Number: [phone]  [facility] Crisis Line Number: [phone]  [facility] Crisis Line Number: [phone]   [facility] Crisis Line Number: [phone]  [place]: [phone]  [place]: [phone]  [facility] Crisis Number: [phone]  [facility] Crisis: [phone]      Other things that are important when I'm in crisis:   Ask for help    Additional resources and information:     Mental  "Health Apps  My3  https://my3app.org/    VirtualHopeBox  https://Adonit/apps/virtual-hope-box/       Professionals or Agencies I Can Contact During A Crisis:       Crisis Lines  Crisis Text Line  Text 122783  You will be connected with a trained live crisis counselor to provide support.    The [employer] Project (LGBTQ Youth Crisis Line)  1.798.263.5037  text START to 972-993    National [place] on Mental Illness (MAGY)  547.512.5069 or 1.110.MAGY.HELPS    National Suicide Prevention Lifeline at 9-255-521-THJV (6807)     Throughout  [place]: call **CRISIS (**145177)     Crisis Text Line: is available for free, 24/7 by texting [place] to 186701    Cloud.CM  Fast Tracker  Linking people to mental health and substance use disorder resources  YOHO.org     [place] Mental Health Warm Line  Peer to peer support  Monday thru Saturday, 12 pm to 10 pm  510.891.6021 or 1.540.846.6363  Text \"Support\" to 05793     [place] on Mental Illness (www.[place].magy.org): 234.652.9219 or 258-986-8429     [facility] Phone (free remote counseling): 157.953.3111 Web address:   https://PicsaStock.org/     www.GetO2 (filter for insurance, gender preference, etc.)    [facility]   [phone]  Intake appointment will be virtual, following appointments can be in person or virtual.   **IMMEDIATE OPENINGS**    [facility]  [phone]  *offers individual therapy, medication management and Mental Health Case Workers; can self refer    [facility]  [phone]  *Immediate Openings    [facility]  [phone]  *Immediate Openings    [facility]  [phone]  *Immediate Openings    Please follow up with scheduled providers to ensure all necessary paperwork is filled out prior to your   scheduled telehealth appointments.     Coordinators from [facility] will be calling within two " business days to ensure   that you have the resources you may need or provide assistance with scheduling (Phone number: [phone].).    Remember: give the referrals 3 sessions prior to calling it quits. Do you trust them? Do you feel   understood? Do you think they can help? Check in with yourself after each session    Please reach out to the [facility]([phone]) regarding further mental health appointment needs for this emergency department visit.    [facility] SCHEDULING:  Today you were seen by a licensed mental health professional through [facility], [facility] ([facility])  for a crisis assessment in the Emergency Department at [facility].  It is recommended that you follow up with your estabished providers (psychiatrist, menta health therapist, and/or primary care doctor - as relevant) as soon as possible. Coordinators from [facility] will be calling you in the next 24-48 hours to ensure that you have the resources you need.  You can so contact [facility] coordinators directly at [phone].     [facility] maintains an extensive network of licensed behavioral health providers to connect patients with the services they need.  We do not charge providers a fee to participate in our referral network.  We match patients with providers based on a patient s specific needs, insurance coverage, and location.  Our first effort will be to refer you to a provider within your care system, and will utilize providers outside your care system as needed.

## 2023-02-28 NOTE — ED PROVIDER NOTES
ED Provider Note  [facility]      History     Chief Complaint   Patient presents with     Suicidal     Suicidal thoughts of biting herself to death, did not bite self, wants to hurt a mystery person     HPI  Sadaf Ross is a 22 year old female who is here reporting self-harm thoughts. She was seen earlier with her outpatient care provider and there were no issues regarding Mh instability. Patient had some sort of disagreement with her staff (according to staff), and she asked to be brought in. She was reporting hearing voices.    I offered a dose of Zyprexa as she reports having hallucinations. She declined it. She felt calm and felt ready to return to her group home.    Past Medical History  Past Medical History:   Diagnosis Date     ADHD (attention deficit hyperactivity disorder)      Bipolar 1 disorder (H)      Borderline personality disorder (H)      Depression      Depressive disorder      Intellectual disability      Obesity      Syncope      Past Surgical History:   Procedure Laterality Date     APPENDECTOMY       APPENDECTOMY       acetaminophen (TYLENOL) 325 MG tablet  alum & mag hydroxide-simethicone (MAALOX  ES) 400-400-40 MG/5ML SUSP suspension  ARIPiprazole lauroxil ER (ARISTADA) 882 MG/3.2ML intra-muscular  benzonatate (TESSALON) 200 MG capsule  benztropine (COGENTIN) 1 MG tablet  bisacodyl (DULCOLAX) 5 MG EC tablet  calcium carbonate (TUMS) 500 MG chewable tablet  clobetasol (TEMOVATE) 0.05 % CREA cream  cyclobenzaprine (FLEXERIL) 10 MG tablet  diclofenac (VOLTAREN) 1 % topical gel  docusate sodium (COLACE) 50 MG capsule  fluticasone (FLONASE) 50 MCG/ACT nasal spray  guaiFENesin (MUCINEX) 600 MG 12 hr tablet  hydrocortisone (CORTAID) 0.5 % external cream  hydrOXYzine (ATARAX) 50 MG tablet  hyoscyamine (LEVSIN/SL) 0.125 MG sublingual tablet  ibuprofen (ADVIL/MOTRIN) 400 MG tablet  loperamide (IMODIUM) 2 MG capsule  loratadine (CLARITIN) 10 MG tablet  LORazepam (ATIVAN)  0.5 MG tablet  multivitamin, therapeutic (THERA-VIT) TABS tablet  OLANZapine zydis (ZYPREXA) 5 MG ODT  omeprazole (PRILOSEC) 40 MG DR capsule  ondansetron (ZOFRAN) 4 MG tablet  polyethylene glycol (MIRALAX) 17 GM/Dose powder  prochlorperazine (COMPAZINE) 10 MG tablet  promethazine (PHENERGAN) 12.5 MG tablet  sennosides (SENOKOT) 8.6 MG tablet  traZODone (DESYREL) 50 MG tablet  venlafaxine (EFFEXOR-ER) 225 MG 24 hr tablet  Vitamin D, Cholecalciferol, 25 MCG (1000 UT) TABS      Allergies   Allergen Reactions     Penicillins Rash and Unknown     Family History  Family History   Problem Relation Age of Onset     Diabetes Type 1 Father      Cancer Paternal Grandfather      Social History   Social History     Tobacco Use     Smoking status: Every Day     Packs/day: 1.00     Years: 5.00     Pack years: 5.00     Types: Vaping Device, Cigarettes     Smokeless tobacco: Never   Substance Use Topics     Alcohol use: No     Drug use: No         A medically appropriate review of systems was performed with pertinent positives and negatives noted in the HPI, and all other systems negative.    Physical Exam   BP: 117/78  Pulse: 90  Temp: 98.4  F (36.9  C)  Resp: 16  Weight: 107.5 kg (237 lb)  SpO2: 97 %  Physical Exam  Vitals and nursing note reviewed.   HENT:      Head: Normocephalic.   Eyes:      Pupils: Pupils are equal, round, and reactive to light.   Pulmonary:      Effort: Pulmonary effort is normal.   Musculoskeletal:         General: Normal range of motion.      Cervical back: Normal range of motion.   Neurological:      General: No focal deficit present.      Mental Status: She is alert.   Psychiatric:         Attention and Perception: Attention and perception normal. She does not perceive auditory or visual hallucinations.         Mood and Affect: Mood and affect normal.         Speech: Speech normal.         Behavior: Behavior normal. Behavior is not agitated, aggressive, hyperactive or combative. Behavior is cooperative.          Thought Content: Thought content normal. Thought content is not paranoid or delusional. Thought content does not include homicidal or suicidal ideation.         Cognition and Memory: Cognition and memory normal.         Judgment: Judgment normal.          ED Course, Procedures, & Data      Procedures       ED Course Selections: A consult was attained from the  DEC service. The case was discussed with meg You from that service. The consulting service's recommendations were provided at 19:15 PM. 10 minutes spent discussing case, care and disposition.    5 minutes spent reviewing prior records including last ED visit and intervention and EMS notes from today.     Mental Health Risk Assessment      PSS-3    Date and Time Over the past 2 weeks have you felt down, depressed, or hopeless? Over the past 2 weeks have you had thoughts of killing yourself? Have you ever attempted to kill yourself? When did this last happen? User   02/27/23 1751 yes yes yes between 1 and 6 months ago KARIS      C-SSRS ([place])    Date and Time Q1 Wished to be Dead (Past Month) Q2 Suicidal Thoughts (Past Month) Q3 Suicidal Thought Method Q4 Suicidal Intent without Specific Plan Q5 Suicide Intent with Specific Plan Q6 Suicide Behavior (Lifetime) Within the Past 3 Months? RETIRED: Level of Risk per Screen Screening Not Complete User   02/27/23 1751 yes yes yes no yes yes -- -- --               Suicide assessment completed by mental health (D.E.C., LCSW, etc.)       No results found for any visits on 02/27/23.  Medications   OLANZapine zydis (zyPREXA) ODT tab 10 mg (has no administration in time range)     Labs Ordered and Resulted from Time of ED Arrival to Time of ED Departure - No data to display  No orders to display          Critical care was not performed.     Medical Decision Making  The patient's presentation was of moderate complexity (a chronic illness mild to moderate exacerbation, progression, or side effect of  treatment).    The patient's evaluation involved:  an assessment requiring an independent historian (see separate area of note for details)  review of external note(s) from 2 sources (see separate area of note for details)    The patient's management necessitated moderate risk (limitations due to social determinants of health (see separate area of note for details)) and high risk (a decision regarding hospitalization).      Assessment & Plan    Patient is here for a mental health assessment. She is well-known to us due to her frequent ED visits. Patient resides in a group home. She has intellectual disability, borderline personality disorder and poor coping and low frustration tolerance. Patient typically comes into the ED as she feels her needs are not being met in the community.    Patient comes in today as she got into some sort of argument with staff then reported having hallucinations and feeling suicidal. She was offered a dose of Zyprexa here which she declined. She was feeling calmer and wanted to go back to her group home.    Patient appears at baseline. There is no acute safety concern nor active psychosis and emergent intervention is not needed. She does not need to be held here for further evaluation or monitoring. She can be discharged back to her group home via ambulance as she is a vulnerable adult.    I have reviewed the nursing notes. I have reviewed the findings, diagnosis, plan and need for follow up with the patient.    New Prescriptions    No medications on file       Final diagnoses:   Intellectual disability   Acute reaction to stress       Magno Sena MD  [facility] EMERGENCY DEPARTMENT  2/27/2023     Magno Sena MD  02/27/23 1949

## 2023-03-02 ENCOUNTER — HOSPITAL ENCOUNTER (EMERGENCY)
Facility: CLINIC | Age: 23
Discharge: HOME | End: 2023-03-02
Attending: PSYCHIATRY & NEUROLOGY | Admitting: PSYCHIATRY & NEUROLOGY
Payer: MEDICARE

## 2023-03-02 VITALS
HEART RATE: 80 BPM | RESPIRATION RATE: 18 BRPM | SYSTOLIC BLOOD PRESSURE: 119 MMHG | TEMPERATURE: 98.6 F | DIASTOLIC BLOOD PRESSURE: 78 MMHG | OXYGEN SATURATION: 97 %

## 2023-03-02 DIAGNOSIS — F60.3 BORDERLINE PERSONALITY DISORDER (H): ICD-10-CM

## 2023-03-02 DIAGNOSIS — F79 INTELLECTUAL DISABILITY: ICD-10-CM

## 2023-03-02 PROCEDURE — 99284 EMERGENCY DEPT VISIT MOD MDM: CPT | Performed by: PSYCHIATRY & NEUROLOGY

## 2023-03-02 PROCEDURE — 90791 PSYCH DIAGNOSTIC EVALUATION: CPT

## 2023-03-02 PROCEDURE — 99285 EMERGENCY DEPT VISIT HI MDM: CPT | Mod: 25 | Performed by: PSYCHIATRY & NEUROLOGY

## 2023-03-02 ASSESSMENT — COLUMBIA-SUICIDE SEVERITY RATING SCALE - C-SSRS
2. HAVE YOU ACTUALLY HAD ANY THOUGHTS OF KILLING YOURSELF?: NO
6. HAVE YOU EVER DONE ANYTHING, STARTED TO DO ANYTHING, OR PREPARED TO DO ANYTHING TO END YOUR LIFE?: NO
TOTAL  NUMBER OF ABORTED OR SELF INTERRUPTED ATTEMPTS SINCE LAST CONTACT: NO
1. SINCE LAST CONTACT, HAVE YOU WISHED YOU WERE DEAD OR WISHED YOU COULD GO TO SLEEP AND NOT WAKE UP?: NO
SUICIDE, SINCE LAST CONTACT: NO
ATTEMPT SINCE LAST CONTACT: NO
TOTAL  NUMBER OF INTERRUPTED ATTEMPTS SINCE LAST CONTACT: NO

## 2023-03-02 ASSESSMENT — ACTIVITIES OF DAILY LIVING (ADL): ADLS_ACUITY_SCORE: 37

## 2023-03-03 NOTE — ED TRIAGE NOTES
Triage Assessment     Row Name 03/02/23 2002       Triage Assessment (Adult)    Airway WDL WDL       Respiratory WDL    Respiratory WDL WDL       Skin Circulation/Temperature WDL    Skin Circulation/Temperature WDL WDL       Cardiac WDL    Cardiac WDL WDL       Peripheral/Neurovascular WDL    Peripheral Neurovascular WDL WDL       Cognitive/Neuro/Behavioral WDL    Cognitive/Neuro/Behavioral WDL WDL

## 2023-03-03 NOTE — ED NOTES
Report given to EMS. Called [facility] [phone] time of departure to give report. Report given to Courtney

## 2023-03-03 NOTE — ED PROVIDER NOTES
ED Provider Note  [facility]      History     Chief Complaint   Patient presents with     Suicide Attempt     Per EMS, pt call 911 for suicidal attempt but no actual plan. VS: /68, , O2 sat 98%,       HPI  Sadaf Ross is a 22 year old female who is here via EMS from her group home as she allegedly attempted suicide. She previous had superficially cut. She does not have any noticeable cuts to her arms. She denies doing anything else. Patient currently appears calm and in emotional and behavioral control. She is not psychotic nor appears altered. She feels ready to return to her group home.    Per records, she was seen at her primary care provider today. Her last ED visit a week ago also coincided with a visit to her primary care provider. Patient does not feel there is a connection nor contributory effect.    Past Medical and Surgical History, Medications, Allergies, and Social History were reviewed in the electronic medical record. Review with the patient was attempted but limited due to altered mental status.  (intellectual disabled).    A medically appropriate review of systems was performed with pertinent positives and negatives noted in the HPI, and all other systems negative. and A complete review of systems was attempted but limited due to altered mental status.    Physical Exam   BP: 118/85  Pulse: 90  Temp: 98.2  F (36.8  C)  Resp: 18  SpO2: 96 %  Physical Exam  Vitals and nursing note reviewed.   HENT:      Head: Normocephalic.   Eyes:      Pupils: Pupils are equal, round, and reactive to light.   Pulmonary:      Effort: Pulmonary effort is normal.   Musculoskeletal:         General: Normal range of motion.      Cervical back: Normal range of motion.   Neurological:      General: No focal deficit present.      Mental Status: She is alert.   Psychiatric:         Mood and Affect: Mood normal.         Speech: Speech normal.         Behavior: Behavior normal.  Behavior is cooperative.         Thought Content: Thought content normal.         Cognition and Memory: Cognition normal.         Judgment: Judgment normal.           ED Course, Procedures, & Data      Procedures       ED Course Selections: A consult was attained from the [facility] service. The case was discussed with Tori Baxter from that service. The consulting service's recommendations were provided at 8:30 PM. 5 minutes spent discussing case, care and disposition.    10 minutes spent reviewing records and recent interventions.     Mental Health Risk Assessment      PSS-3    Date and Time Over the past 2 weeks have you felt down, depressed, or hopeless? Over the past 2 weeks have you had thoughts of killing yourself? Have you ever attempted to kill yourself? When did this last happen? User   03/02/23 2002 yes yes no -- KMX      C-SSRS ([place])    Date and Time Q1 Wished to be Dead (Past Month) Q2 Suicidal Thoughts (Past Month) Q3 Suicidal Thought Method Q4 Suicidal Intent without Specific Plan Q5 Suicide Intent with Specific Plan Q6 Suicide Behavior (Lifetime) Within the Past 3 Months? RETIRED: Level of Risk per Screen Screening Not Complete User   03/02/23 2002 -- yes yes yes no no -- -- -- KMX              Suicide assessment completed by mental health (D.E.C., LCSW, etc.)       No results found for any visits on 03/02/23.  Medications - No data to display  Labs Ordered and Resulted from Time of ED Arrival to Time of ED Departure - No data to display  No orders to display          Critical care was not performed.     Medical Decision Making  The patient's presentation was of moderate complexity (2 or more stable chronic illnesses).    The patient's evaluation involved:  review of external note(s) from 2 sources (see separate area of note for details)  ordering and/or review of 2 test(s) in this encounter (see separate area of note for details)    The patient's management necessitated moderate risk  (limitations due to social determinants of health (see separate area of note for details)) and high risk (a decision regarding hospitalization).      Assessment & Plan    Patient is here via EMS from her group home as she allegedly felt suicidal. Patient had an earlier visit with her established provider ([facility]) and there were no safety concern nor did she feel suicidal. She was unable to verbalize why she needed to come to the ED. She does not exhibit psychosis. She currently denies plan or intent. She is not interested in any meds. She is ready to go back.    Patient has chronic poor coping skills and seeks the ED to get her needs addressed.appears at baseline. She presently appears at baseline and is ready to return to her group home. She will be transported back via ambulance. She is encouraged to use her established care services and support.    I have reviewed the nursing notes. I have reviewed the findings, diagnosis, plan and need for follow up with the patient.    New Prescriptions    No medications on file       Final diagnoses:   Intellectual disability   Borderline personality disorder (H)       Magno Sena MD  [facility] EMERGENCY DEPARTMENT  3/2/2023     Magno Sena MD  03/02/23 9004

## 2023-03-03 NOTE — CONSULTS
"Diagnostic Evaluation Consultation  Crisis Assessment    Patient was assessed: In Person  Patient location: [facility] ED  Was a release of information signed: No. Reason: no need, no changes      Referral Data and Chief Complaint  Sadaf is a 22 year old female, who uses she/her pronouns, and presents to the ED via EMS. Patient is referred to the ED by self. Patient is presenting to the ED for the following concerns: 'concerns about mental health'.      Informed Consent and Assessment Methods     Patient is her own guardian. Writer met with patient and explained the crisis assessment process, including applicable information disclosures and limits to confidentiality, assessed understanding of the process, and obtained consent to proceed with the assessment. Patient was observed to be able to participate in the assessment as evidenced by being willing and able to engage.. Assessment methods included conducting a formal interview with patient, review of medical records, collaboration with medical staff, and obtaining relevant collateral information from family and community providers when available.  Spoke with  staff for collateral tonight as well.     Over the course of this crisis assessment provided reassurance, engaged patient in problem solving and disposition planning and worked with patient on brief, therapeutic activity: when overwhlemed just \"STOP\" (Stop, Take a breathe, Observe, Proceed). Patient's response to interventions was receptive.     Summary of Patient Situation     Pt reports that she was \"worried about my mental health\".  Sadaf did state that she is 'scared of going to sleep and never waking up'.  She expresses no specific concerns, just general anxiety.  She also indicates that she did talk to staff Devyn whom she claims told her to go to the ED.  Arlene from  reports that pt was supposedly planning this ED trip.   Discussed pt's many coping skills that she knows.  She reports that she did use " word search today before talking to Devyn.  Discussed other fears such as sleeping in the dark and how she uses a light and her many soft stuffed animals to comfort her in those anxious times.  Suggested pt could use her stuffed animals in times like today as well.  During past ED visits this writer spoke to pt about communicating with ED staff via letters (journaling technique) that could be sent here.  She admits that she forgot about that and hasn't tried it.  She was given the mailing address again as an option for another coping strategy.    NO suicidal intent or plan.  NO hi . NO psychotic sx.    Brief Psychosocial History     Pt reports she lives at a group home with 24/7 staffing. Pt reports she is her own guardian. Patient reports her father passed away approximately 2 years ago and she continues to struggle with managing her grief from this. Pt reports she has a supportive family and established outpatient supports.      Significant Clinical History     Pt has a history of borderline personality disorder, intellectual disability, and bipolar. Per chart review, pt has a hx of multiple ED visits, with the most recent being on 2/17/23 for various chief complaints, seeking secondary gain from attention in the ED and not wanting to be at her group home. Pt has a hx of inpatient hospitalizations with the last one being on 11/21/2021 at [facility]. Pt has 24/7 group home staffing who try to support the patient and encourage her to talk to them or utilize other resources other than the ED. Pt has a PCP, psychiatrist, therapist, and  whom she all works with regularly and trusts. Pt has a care plan at her , and one in place at [facility] ED (below):     The pt has become increasingly disruptive in the Emergency Department.  The pt will yell, demand, and scream and disrupt the milieu, and this happens before she finds out the recommendation to send her back to her group home.  Today, the pt postured toward the ED  doctor and threatened to  punch him.   During one of her ED visits over the past week, the pt befriended another patient who is waiting for an inpatient bed.  The pt has been texting and contacting this patient.  There is concern that one of the reasons the pt is coming to the ED is to interact with this patient.        An Emergency Department Care Plan is being sought in order to reduce and extinguish the pt s numerous ED visits.  The pt does have an active outpatient team that the pt can utilize for support and lives in a group home with 24/7 staffing.  Typically, the pt calls 911, not group home staff.  Additionally, [facility] have attempted to recommend additionally services, such as day treatment, and the pt refuses the recommendations.  It appears that the pt comes to the ED, requesting admission as she does not like her group home.        To that end, it is recommended that if the pt returns to the Emergency Department, she be triaged and if there is not any acute new symptoms, both medically and mental health-wise, the group home be called and informed that the pt is returning and medical transport be set up for her return.  If the pt becomes dysregulated, the pt should be offered/given appropriate medications.  This should not hinder her return to her group home.        9/27/22, Nuria Deleon [facility]; Dr. Luis De Anda; Meche Butts [facility]    Collateral Information    The following information was received from Arlene whose relationship to the patient is  staff member. Information was obtained via phone. Their phone number is [phone] and they last had contact with patient on today.  Arlene reports no new changes or issues.  Pt was believed to have been planning this trip to the ED [facility].  They are staffed at the  for her and will take her back as needed.        Risk Assessment    [place] Suicide Severity Rating Scale Since Last Contact:   Suicidal Ideation (Since Last Contact)  1. Wish to be Dead  (Since Last Contact): No  2. Non-Specific Active Suicidal Thoughts (Since Last Contact): No  Suicidal Behavior (Since Last Contact)  Actual Attempt (Since Last Contact): No  Has subject engaged in non-suicidal self-injurious behavior? (Since Last Contact): No  Interrupted Attempts (Since Last Contact): No  Aborted or Self-Interrupted Attempt (Since Last Contact): No  Preparatory Acts or Behavior (Since Last Contact): No  Suicide (Since Last Contact): No     C-SSRS Risk (Since Last Contact)  Calculated C-SSRS Risk Score (Since Last Contact): No Risk Indicated    Validity of evaluation is not impacted by presenting factors during interview .   Comments regarding subjective versus objective responses to Craig tool: see narrative  Environmental or Psychosocial Events: challenging interpersonal relationships and impulsivity/recklessness  Chronic Risk Factors: history of psychiatric hospitalization, personality disorders and history of Non-Suicidal Self Injury (NSSI)   Warning Signs: no reason for living, no sense of purpose in life and recent discharges from emergency department or inpatient psychiatric care  Protective Factors: lives in a responsibly safe and stable environment, good treatment engagement, sense of importance of health and wellness, supportive ongoing medical and mental health care relationships and help seeking  Interpretation of Risk Scoring, Risk Mitigation Interventions and Safety Plan:  Pt identifies no suicidal intent or plans at this time.  She feels safe returning home and agrees to try to use her coping skills in her GH.       Does the patient have thoughts of harming others? No     Is the patient engaging in sexually inappropriate behavior?  no        Current Substance Abuse     Is there recent substance abuse? no     Was a urine drug screen or blood alcohol level obtained: No       Mental Status Exam     Affect: Appropriate and Other: dulled as is baseline   Appearance: Appropriate and  "Other: wearing baseball hat inside in the winter    Attention Span/Concentration: Attentive  Eye Contact: Variable   Fund of Knowledge: Delayed    Language /Speech Content: Fluent   Language /Speech Volume: Normal    Language /Speech Rate/Productions: Normal and Slow    Recent Memory: Variable   Remote Memory: Poor and Variable   Mood: Anxious and Sad    Orientation to Person: Yes    Orientation to Place: Yes   Orientation to Time of Day: Yes    Orientation to Date: Yes    Situation (Do they understand why they are here?): Answer: 'because I'm worried about my mental health\"    Psychomotor Behavior: Normal    Thought Content: Clear   Thought Form: Goal Directed      History of commitment: No       Medication    Psychotropic medications: yes, see medication sheet   Medication changes made in the last two weeks: No:  reports no new changes       Current Care Team  Primary Care Provider: Jess Wilkins MD - [facility]  Psychiatrist: Emma Jacobson NP - [facility]  Therapist: Rd Barton MA, [facility], and is starting trauma therapy with Lynne will.  : Yes  Other: Group home.        Diagnosis     301.83 (F60.3) Borderline Personality Disorder   Intellectual Disabilites  319 (F79) Unspecified Intellectual Disability (Intellectual Developmental Disorder) - by history   296.50 Bipolar I Disorder Current or Most Recent Episode Depressed, unspecified - by history     Clinical Summary and Substantiation of Recommendations    Pt reports that she was \"worried about my mental health\" thus she called 911 from her group home and had EMS transport.  Here in the ED she reports being anxious about falling asleep and never waking up, but has no suicidal intent or plan.  Denies current homicidal ideation.She has support of  staff and outpt therapy and medication management in place. Discussed the coping skills she has and encouraged her to use them to help.   staff notes no new changes or stressors, behaviors are " consistent with baseline.  Recommendation is for pt to return to the  at this time to follow up with existing providers.   Disposition    Recommended disposition: Group Home: return to existing        Reviewed case and recommendations with attending provider. Attending Name: Dr Magno Sena       Attending concurs with disposition: Yes       Patient concurs with disposition: Yes       Guardian concurs with disposition: NA      Final disposition: Individual therapy , Medication management and Group home: current residence .     Outpatient Details (if applicable):   Aftercare plan and appointments placed in the AVS and provided to patient: Yes. Given to patient by ED staff    Was lethal means counseling provided as a part of aftercare planning? Pt has safety plan in place at her group home including restricting access to sharps and medications;       Assessment Details    Patient interview started at: 8:10pm and completed at: 8:35pm.     Total duration spent on the patient case in minutes: .75 hrs      CPT code(s) utilized: 67168 - Psychotherapy for Crisis - 60 (30-74*) min       Tori Baxter, [facility] C.[facility] Dean HospitalSW, MSW, LICSW, Psychotherapist  DEC - Triage & Transition Services  Callback: [phone]        Aftercare Plan    If I am feeling unsafe or I am in a crisis, I will:   Contact my established care providers   Call the National Suicide Prevention Lifeline: 988  Go to the nearest emergency room   Call 911     Warning signs that I or other people might notice when a crisis is developing for me: avoiding talking to staff, calling 911 instead of using coping skills, increased fears and anxiety    Things I am able to do on my own to cope or help me feel better: word search, coloring     Things that I am able to do with others to cope or help me better: talk about feelings, go for a walk for fresh air    Things I can use or do for distraction: videos or tv show, splash cold water on face or hands    Changes I can make  to support my mental health and wellness: use the coping skills you know you have, get fresh air and exercise daily     People in my life that I can ask for help:  staff, therapist, family    Your [facility] has a mental health crisis team you can call 24/7: [facility] Mobile Crisis  [phone]     Additional resources and information:   Consider journaling, which can be in the form of writing ED staff a letter and mail it.  Use STOP technique:   STOP your worries  Take a breath  Observe what's around you (like the coping skills tool box)  Proceed by making safe choices to use coping skills  ( and talk to Arlene before calling 911)    Also can use this:  This technique will take you through your five senses to help remind you of the present. This is a calming technique that can help you get through tough or stressful situations.     5 - LOOK: Look around for 5 things that you can see, and say them out loud. For example, you could say, I see the computer, I see the cup, I see the picture frame.     4 - FEEL: Pay attention to your body and think of 4 things that you can feel, and say them out loud. For example, you could say, I feel my feet warm in my socks, I feel the hair on the back of my neck, or I feel the pillow I am sitting on.    3 - LISTEN: Listen for 3 sounds. It could be the sound of traffic outside, the sound of typing or the sound of your tummy rumbling. Say the three things out loud.    2 - SMELL: Say two things you can smell. If you re allowed to, it s okay to move to another spot and sniff something. If you can t smell anything at the moment or you can t move, then name your 2 favorite smells.    1 - TASTE: Say one thing you can taste. It may be the toothpaste from brushing your teeth, or a mint from after lunch. If you can t taste anything, then say your favorite thing to taste.         Crisis Lines  Crisis Text Line  Text 914841  You will be connected with a trained live crisis counselor to  "provide support.    Por espanol, texto  SIRENA a 688183 o texto a 442-AYUDAME en WhatsApp    The Mikey Project (LGBTQ Youth Crisis Line)  1.042.401.0825  text START to 973-020      Community Resources  Fast Tracker  Linking people to mental health and substance use disorder resources  [employer]Cleann.Octoshape     [place] Mental Health Warm Line  Peer to peer support  Monday thru Saturday, 12 pm to 10 pm  161.365.9370 or 5.248.209.8388  Text \"Support\" to 73116    National [place] on Mental Illness (MAGY)  912.194.2684 or 1.888.MAGY.HELPS      Mental Health Apps  My3  https://Active Media.org/    VirtualHopeBox  https://[employer]/apps/virtual-hope-box/      Additional Information  Today you were seen by a licensed mental health professional through Triage and Transition services, [facility] ([facility])  for a crisis assessment in the Emergency Department at [facility].  It is recommended that you follow up with your established providers (psychiatrist, mental health therapist, and/or primary care doctor - as relevant) as soon as possible. Coordinators from [facility] will be calling you in the next 24-48 hours to ensure that you have the resources you need.  You can also contact [facility] coordinators directly at [phone]. You may have been scheduled for or offered an appointment with a mental health provider. [facility] maintains an extensive network of licensed behavioral health providers to connect patients with the services they need.  We do not charge providers a fee to participate in our referral network.  We match patients with providers based on a patient's specific needs, insurance coverage, and location.  Our first effort will be to refer you to a provider within your care system, and will utilize providers outside your care system as needed.                  "

## 2023-03-03 NOTE — DISCHARGE INSTRUCTIONS
Aftercare Plan    If I am feeling unsafe or I am in a crisis, I will:   Contact my established care providers   Call the National Suicide Prevention Lifeline: 988  Go to the nearest emergency room   Call 911     Warning signs that I or other people might notice when a crisis is developing for me: avoiding talking to staff, calling 911 instead of using coping skills, increased fears and anxiety    Things I am able to do on my own to cope or help me feel better: word search, coloring     Things that I am able to do with others to cope or help me better: talk about feelings, go for a walk for fresh air    Things I can use or do for distraction: videos or tv show, splash cold water on face or hands    Changes I can make to support my mental health and wellness: use the coping skills you know you have, get fresh air and exercise daily     People in my life that I can ask for help:  staff, therapist, family    Your [facility] has a mental health crisis team you can call 24/7: [facility] Mobile Crisis  [phone]     Additional resources and information:   Consider journaling, which can be in the form of writing ED staff a letter and mail it.  Use STOP technique:   STOP your worries  Take a breath  Observe what's around you (like the coping skills tool box)  Proceed by making safe choices to use coping skills  ( and talk to Arlene before calling 911)    Also can use this:  This technique will take you through your five senses to help remind you of the present. This is a calming technique that can help you get through tough or stressful situations.     5 - LOOK: Look around for 5 things that you can see, and say them out loud. For example, you could say, I see the computer, I see the cup, I see the picture frame.     4 - FEEL: Pay attention to your body and think of 4 things that you can feel, and say them out loud. For example, you could say, I feel my feet warm in my socks, I feel the hair on the back of my neck, or  "I feel the pillow I am sitting on.    3 - LISTEN: Listen for 3 sounds. It could be the sound of traffic outside, the sound of typing or the sound of your tummy rumbling. Say the three things out loud.    2 - SMELL: Say two things you can smell. If you re allowed to, it s okay to move to another spot and sniff something. If you can t smell anything at the moment or you can t move, then name your 2 favorite smells.    1 - TASTE: Say one thing you can taste. It may be the toothpaste from brushing your teeth, or a mint from after lunch. If you can t taste anything, then say your favorite thing to taste.         Crisis Lines  Crisis Text Line  Text 190636  You will be connected with a trained live crisis counselor to provide support.    Dudley gonsalves, texto  SIRENA a 485614 o texto a 442-AYUDAME en WhatsApp    The Mikey Project (LGBTQ Youth Crisis Line)  7.342.016.9036  text START to 324-397      [employer]  Fast Tracker  Linking people to mental health and substance use disorder resources  [employer]     [place] Mental Health Warm Line  Peer to peer support  Monday thru Saturday, 12 pm to 10 pm  195.960.6093 or 3.457.297.6075  Text \"Support\" to 84319    National [place] on Mental Illness (MAGY)  883.981.7735 or 1.888.MAGY.HELPS      Mental Health Apps  My3  https://mySolvestingpp.org/    VirtualHopeBox  https://WeGather.org/apps/virtual-hope-box/      Additional Information  Today you were seen by a licensed mental health professional through Triage and Transition services, [facility] ([facility])  for a crisis assessment in the Emergency Department at [facility].  It is recommended that you follow up with your established providers (psychiatrist, mental health therapist, and/or primary care doctor - as relevant) as soon as possible. Coordinators from [facility] will be calling you in the next 24-48 hours to ensure that you have the resources you need.  You can also contact [facility] " coordinators directly at [phone]. You may have been scheduled for or offered an appointment with a mental health provider. [facility] maintains an extensive network of licensed behavioral health providers to connect patients with the services they need.  We do not charge providers a fee to participate in our referral network.  We match patients with providers based on a patient's specific needs, insurance coverage, and location.  Our first effort will be to refer you to a provider within your care system, and will utilize providers outside your care system as needed.

## 2023-03-05 ENCOUNTER — HOSPITAL ENCOUNTER (EMERGENCY)
Facility: CLINIC | Age: 23
Discharge: HOME | End: 2023-03-05
Attending: EMERGENCY MEDICINE | Admitting: EMERGENCY MEDICINE
Payer: MEDICARE

## 2023-03-05 VITALS
RESPIRATION RATE: 18 BRPM | DIASTOLIC BLOOD PRESSURE: 90 MMHG | HEART RATE: 90 BPM | TEMPERATURE: 99 F | OXYGEN SATURATION: 98 % | SYSTOLIC BLOOD PRESSURE: 136 MMHG

## 2023-03-05 DIAGNOSIS — R45.851 SUICIDAL IDEATION: ICD-10-CM

## 2023-03-05 PROCEDURE — 99282 EMERGENCY DEPT VISIT SF MDM: CPT | Performed by: EMERGENCY MEDICINE

## 2023-03-05 ASSESSMENT — ACTIVITIES OF DAILY LIVING (ADL): ADLS_ACUITY_SCORE: 37

## 2023-03-05 NOTE — ED NOTES
Group home called, pt is able to go back to group home. Writer spoke to Cassy. Address confirmed, [address] [place] [address], [place] [address].  Ambulance for transport back set up.

## 2023-03-05 NOTE — ED PROVIDER NOTES
ED Provider Note  [facility]      History     Chief Complaint   Patient presents with     Suicidal     Pt came in from here group home with SI with a plan.     HPI  Sadaf Ross is a 22 year old female who has a history significant for intellectual disability, borderline personality disorder, chronic suicidality and depression.  She arrives today to the emergency department for evaluation of escalation of suicidality.  The patient reports she has been thinking about the loss of her father.  She reports losing her father approximately 2 years ago.  She reports she is chronically suicidal but today had increasing thoughts of no longer wanting to be around.  She states that she does not recall any time where she has acted on these thoughts.  To me she reports no specific plan.  She states I just want to go home.  Patient feels like she would be comfortable at home.  She reports no ingestion of alcohol or illicit substances.  She reports no concern for safety at this time.  She does not want a work-up.  The patient does not want any mental health assessment.  She reports no current medical problems such as nausea, vomiting, diarrhea, melena, hematochezia.  She is been compliant with her medication per patient.    Past Medical History  Past Medical History:   Diagnosis Date     ADHD (attention deficit hyperactivity disorder)      Bipolar 1 disorder (H)      Borderline personality disorder (H)      Depression      Depressive disorder      Intellectual disability      Obesity      Syncope      Past Surgical History:   Procedure Laterality Date     APPENDECTOMY       APPENDECTOMY       acetaminophen (TYLENOL) 325 MG tablet  alum & mag hydroxide-simethicone (MAALOX  ES) 400-400-40 MG/5ML SUSP suspension  ARIPiprazole lauroxil ER (ARISTADA) 882 MG/3.2ML intra-muscular  benzonatate (TESSALON) 200 MG capsule  benztropine (COGENTIN) 1 MG tablet  bisacodyl (DULCOLAX) 5 MG EC tablet  calcium  carbonate (TUMS) 500 MG chewable tablet  clobetasol (TEMOVATE) 0.05 % CREA cream  cyclobenzaprine (FLEXERIL) 10 MG tablet  diclofenac (VOLTAREN) 1 % topical gel  docusate sodium (COLACE) 50 MG capsule  fluticasone (FLONASE) 50 MCG/ACT nasal spray  guaiFENesin (MUCINEX) 600 MG 12 hr tablet  hydrocortisone (CORTAID) 0.5 % external cream  hydrOXYzine (ATARAX) 50 MG tablet  hyoscyamine (LEVSIN/SL) 0.125 MG sublingual tablet  ibuprofen (ADVIL/MOTRIN) 400 MG tablet  loperamide (IMODIUM) 2 MG capsule  loratadine (CLARITIN) 10 MG tablet  LORazepam (ATIVAN) 0.5 MG tablet  multivitamin, therapeutic (THERA-VIT) TABS tablet  OLANZapine zydis (ZYPREXA) 5 MG ODT  omeprazole (PRILOSEC) 40 MG DR capsule  ondansetron (ZOFRAN) 4 MG tablet  polyethylene glycol (MIRALAX) 17 GM/Dose powder  prochlorperazine (COMPAZINE) 10 MG tablet  promethazine (PHENERGAN) 12.5 MG tablet  sennosides (SENOKOT) 8.6 MG tablet  traZODone (DESYREL) 50 MG tablet  venlafaxine (EFFEXOR-ER) 225 MG 24 hr tablet  Vitamin D, Cholecalciferol, 25 MCG (1000 UT) TABS      Allergies   Allergen Reactions     Penicillins Rash and Unknown     Family History  Family History   Problem Relation Age of Onset     Diabetes Type 1 Father      Cancer Paternal Grandfather      Social History   Social History     Tobacco Use     Smoking status: Every Day     Packs/day: 1.00     Years: 5.00     Pack years: 5.00     Types: Vaping Device, Cigarettes     Smokeless tobacco: Never   Substance Use Topics     Alcohol use: No     Drug use: No      Past medical history, past surgical history, medications, allergies, family history, and social history were reviewed with the patient. No additional pertinent items.      A medically appropriate review of systems was performed with pertinent positives and negatives noted in the HPI, and all other systems negative.    Physical Exam   BP: (!) 136/90  Pulse: 90  Temp: 99  F (37.2  C)  Resp: 18  SpO2: 98 %  Physical Exam  Vitals and nursing note  reviewed.   Constitutional:       General: She is not in acute distress.     Appearance: Normal appearance. She is not ill-appearing, toxic-appearing or diaphoretic.   HENT:      Head: Normocephalic and atraumatic.      Right Ear: External ear normal.      Left Ear: External ear normal.      Nose: Nose normal. No congestion.      Mouth/Throat:      Mouth: Mucous membranes are moist.      Pharynx: Oropharynx is clear. No oropharyngeal exudate.   Eyes:      Extraocular Movements: Extraocular movements intact.      Conjunctiva/sclera: Conjunctivae normal.      Pupils: Pupils are equal, round, and reactive to light.   Cardiovascular:      Rate and Rhythm: Normal rate.      Pulses: Normal pulses.      Heart sounds: Normal heart sounds. No murmur heard.    No friction rub.   Pulmonary:      Effort: Pulmonary effort is normal. No respiratory distress.      Breath sounds: No stridor. No wheezing, rhonchi or rales.   Musculoskeletal:         General: No deformity or signs of injury. Normal range of motion.      Cervical back: Normal range of motion.   Skin:     General: Skin is warm.      Capillary Refill: Capillary refill takes less than 2 seconds.      Coloration: Skin is not pale.      Findings: No bruising or erythema.   Neurological:      General: No focal deficit present.      Mental Status: She is alert.      Cranial Nerves: No cranial nerve deficit.      Motor: No weakness.   Psychiatric:         Mood and Affect: Mood is depressed.         Behavior: Behavior normal. Behavior is cooperative.         Thought Content: Thought content is not paranoid or delusional. Thought content does not include homicidal ideation. Thought content does not include homicidal or suicidal plan.           ED Course, Procedures, & Data      Procedures                     No results found for any visits on 03/05/23.  Medications - No data to display  Labs Ordered and Resulted from Time of ED Arrival to Time of ED Departure - No data to  display  No orders to display            Assessment & Plan    Sadaf Ross is a 22 year old female who has a history significant for intellectual disability, borderline personality disorder, chronic suicidality and depression.  She arrives today to the emergency department for evaluation of escalation of suicidality.  Upon arrival patient noted alert.  She is presently afebrile and hemodynamically stable.  She is seated upright in a chair and appears to be nontoxic.  From a medical standpoint she has no sign of obvious acute infection or systemic illness.  She has no signs of external trauma.  GCS 15.  She is oriented and engaged.  From a psychiatric standpoint affect somewhat flat.  She does appear to be down.  She does acknowledge chronic suicidality and recent escalation but feels this is manageable.  She is able to talk through a safety plan and does not want any further mental health evaluation at this plan.  She is requesting to go home.  I do not believe she is holdable.  I did review her chart including multiple recent psychiatric evaluations.  I suspect a chronic component to this.  Overall I have a lower suspicion that she would go home and perform self-harm.  We certainly are happy to reevaluate this patient at any time.  She is able to verbalize me a plan to talk to staff or return emergently with any escalation of suicidality or want for assistance with mental health.  She will otherwise continue to follow-up with her therapist and psychiatrist which she reports she sees twice weekly.  We did call back to her group home who feel comfortable with her returning to home at this time.    I have reviewed the nursing notes. I have reviewed the findings, diagnosis, plan and need for follow up with the patient.    New Prescriptions    No medications on file       Final diagnoses:   Suicidal ideation       Ghassan Luna  [facility] EMERGENCY DEPARTMENT  3/5/2023     Ghassan Luna,  MD  03/05/23 1550

## 2023-03-06 ENCOUNTER — HOSPITAL ENCOUNTER (EMERGENCY)
Facility: CLINIC | Age: 23
Discharge: HOME | End: 2023-03-06
Attending: FAMILY MEDICINE | Admitting: FAMILY MEDICINE
Payer: MEDICARE

## 2023-03-06 VITALS
HEIGHT: 64 IN | DIASTOLIC BLOOD PRESSURE: 85 MMHG | WEIGHT: 242.4 LBS | HEART RATE: 83 BPM | BODY MASS INDEX: 41.38 KG/M2 | TEMPERATURE: 98.7 F | SYSTOLIC BLOOD PRESSURE: 143 MMHG | OXYGEN SATURATION: 99 % | RESPIRATION RATE: 18 BRPM

## 2023-03-06 DIAGNOSIS — F32.A DEPRESSION, UNSPECIFIED DEPRESSION TYPE: ICD-10-CM

## 2023-03-06 DIAGNOSIS — F60.3 BORDERLINE PERSONALITY DISORDER (H): Chronic | ICD-10-CM

## 2023-03-06 DIAGNOSIS — F79 INTELLECTUAL DISABILITY: Chronic | ICD-10-CM

## 2023-03-06 PROCEDURE — 99284 EMERGENCY DEPT VISIT MOD MDM: CPT | Performed by: FAMILY MEDICINE

## 2023-03-06 PROCEDURE — 99285 EMERGENCY DEPT VISIT HI MDM: CPT | Mod: 25 | Performed by: FAMILY MEDICINE

## 2023-03-06 PROCEDURE — 90791 PSYCH DIAGNOSTIC EVALUATION: CPT

## 2023-03-06 ASSESSMENT — ACTIVITIES OF DAILY LIVING (ADL)
ADLS_ACUITY_SCORE: 37
ADLS_ACUITY_SCORE: 37

## 2023-03-06 NOTE — ED NOTES
"Pt is tearful. States her sister had a baby today and it's upsetting to her. Pt also says she is \"blaming myself for my dad's death.\" Pt took a cab to ED from . I called  to make sure they were aware pt is in the ED. 1:1 sitter initiated for hx SIB (head banging, scratching self) in the ED.  "

## 2023-03-06 NOTE — ED TRIAGE NOTES
Patient arrives by cab that she got herself, states the group home doesn't know that she is here. States to be suicidal with plan to head bang.      Triage Assessment     Row Name 03/06/23 8323       Triage Assessment (Adult)    Airway WDL WDL       Respiratory WDL    Respiratory WDL WDL       Skin Circulation/Temperature WDL    Skin Circulation/Temperature WDL WDL       Cardiac WDL    Cardiac WDL WDL       Peripheral/Neurovascular WDL    Peripheral Neurovascular WDL WDL       Cognitive/Neuro/Behavioral WDL    Cognitive/Neuro/Behavioral WDL WDL

## 2023-03-07 ENCOUNTER — HOSPITAL ENCOUNTER (EMERGENCY)
Facility: CLINIC | Age: 23
Discharge: HOME | End: 2023-03-07
Attending: EMERGENCY MEDICINE | Admitting: EMERGENCY MEDICINE
Payer: MEDICARE

## 2023-03-07 ENCOUNTER — NURSE TRIAGE (OUTPATIENT)
Dept: NURSING | Facility: CLINIC | Age: 23
End: 2023-03-07
Payer: MEDICARE

## 2023-03-07 VITALS
SYSTOLIC BLOOD PRESSURE: 142 MMHG | OXYGEN SATURATION: 96 % | RESPIRATION RATE: 18 BRPM | TEMPERATURE: 98 F | HEART RATE: 90 BPM | DIASTOLIC BLOOD PRESSURE: 68 MMHG

## 2023-03-07 DIAGNOSIS — F60.3 BORDERLINE PERSONALITY DISORDER (H): ICD-10-CM

## 2023-03-07 DIAGNOSIS — F79 INTELLECTUAL DISABILITY: ICD-10-CM

## 2023-03-07 PROCEDURE — 250N000013 HC RX MED GY IP 250 OP 250 PS 637: Performed by: EMERGENCY MEDICINE

## 2023-03-07 PROCEDURE — 99283 EMERGENCY DEPT VISIT LOW MDM: CPT | Performed by: EMERGENCY MEDICINE

## 2023-03-07 PROCEDURE — 99284 EMERGENCY DEPT VISIT MOD MDM: CPT | Performed by: EMERGENCY MEDICINE

## 2023-03-07 RX ORDER — OLANZAPINE 10 MG/1
10 TABLET, ORALLY DISINTEGRATING ORAL ONCE
Status: COMPLETED | OUTPATIENT
Start: 2023-03-07 | End: 2023-03-07

## 2023-03-07 RX ADMIN — OLANZAPINE 10 MG: 10 TABLET, ORALLY DISINTEGRATING ORAL at 17:29

## 2023-03-07 ASSESSMENT — ACTIVITIES OF DAILY LIVING (ADL)
ADLS_ACUITY_SCORE: 37

## 2023-03-07 NOTE — ED PROVIDER NOTES
"ED Provider Note  [facility]      History     Chief Complaint   Patient presents with     Suicidal     BIB EMS. Pt endorsing auditory command hallucinations to harm self. Superficial cut to fore arm yesterday. Pt demanding to be put on \"72hr hold,\" in order to be \"admitted.\" Per EMS.      HPI  Sadaf Ross is a 22 year old female who has a history significant for intellectual disability, borderline personality disorder, chronic suicidality and depression who presents saying she is \"f###in suicidal\" and she's going to kill herself. She says she suicidal every day.  She wants to be admitted to the hospital.     Per staff at group home: the patient was at the hospital yesterday and when she returned she was fine. They say she always returns from the hospital fine.  Today Sadaf talked to a  support person and they talked about yesterday and being restrained and scratching herself. This person told Sadaf she should go to the hospital.  After that Sadaf became hyperfocused on coming to the hospital and wouldn't listen to staff at the group home.  They said she was fine prior to wanting to come to the hospital today.  They have no concerns about Sadaf returning to the group home.      DEC assessment yesterday:  \"The pt has become increasingly disruptive in the Emergency Department.  The pt will yell, demand, and scream and disrupt the milieu, and this happens before she finds out the recommendation to send her back to her group home.  Today, the pt postured toward the ED doctor and threatened to  punch him.   During one of her ED visits over the past week, the pt befriended another patient who is waiting for an inpatient bed.  The pt has been texting and contacting this patient.  There is concern that one of the reasons the pt is coming to the ED is to interact with this patient.        An Emergency Department Care Plan is being sought in order to reduce and extinguish the pt s " "numerous ED visits.  The pt does have an active outpatient team that the pt can utilize for support and lives in a group home with 24/7 staffing.  Typically, the pt calls 911, not group home staff.  Additionally, [facility] have attempted to recommend additionally services, such as day treatment, and the pt refuses the recommendations.  It appears that the pt comes to the ED, requesting admission as she does not like her group home.        To that end, it is recommended that if the pt returns to the Emergency Department, she be triaged and if there is not any acute new symptoms, both medically and mental healthwise, the group home be called and informed that the pt is returning and medical transport be set up for her return.  If the pt becomes dysregulated, the pt should be offered/given appropriate medications.  This should not hinder her return to her group home.\"    Also patient was seen yesterday by integrated health specialist:  Actions Taken:  -Sadaf expressed that she is severely anxious about her future and becoming an aunt  -Sadaf filled writer in on care team meeting and what was discussed. Sadaf still doesn't want to participate in vocational activities such as basketball states \"I want to but it takes time I am not ready\"  -Sadaf expressed sadness and grief around her grandmother's boyfriend passing.   -Provided psychoeducation around setting small goals and having a routine that could help Sadaf look forward to her day. Sadaf was able to identify a small goal of going on weekly walks and said that her and her positive  will talk about making a routine today at 1:30pm.  -Sadaf expressed that being an aunt makes her nervous because she wants to take care of herself more. Says her relationship with her sister has become better and that they went on a walk this weekend.   As Sadaf was getting ready to leave she got a text from her sister saying she had her baby and Sadaf said that she " is so excited and can't wait to call her sister.     Follow-Up Plan:  Follow up via phone on Friday 3/10/23      Past Medical History  Past Medical History:   Diagnosis Date     ADHD (attention deficit hyperactivity disorder)      Bipolar 1 disorder (H)      Borderline personality disorder (H)      Depression      Depressive disorder      Intellectual disability      Obesity      Syncope      Past Surgical History:   Procedure Laterality Date     APPENDECTOMY       APPENDECTOMY       acetaminophen (TYLENOL) 325 MG tablet  alum & mag hydroxide-simethicone (MAALOX  ES) 400-400-40 MG/5ML SUSP suspension  ARIPiprazole lauroxil ER (ARISTADA) 882 MG/3.2ML intra-muscular  benzonatate (TESSALON) 200 MG capsule  benztropine (COGENTIN) 1 MG tablet  bisacodyl (DULCOLAX) 5 MG EC tablet  calcium carbonate (TUMS) 500 MG chewable tablet  clobetasol (TEMOVATE) 0.05 % CREA cream  cyclobenzaprine (FLEXERIL) 10 MG tablet  diclofenac (VOLTAREN) 1 % topical gel  docusate sodium (COLACE) 50 MG capsule  fluticasone (FLONASE) 50 MCG/ACT nasal spray  guaiFENesin (MUCINEX) 600 MG 12 hr tablet  hydrocortisone (CORTAID) 0.5 % external cream  hydrOXYzine (ATARAX) 50 MG tablet  hyoscyamine (LEVSIN/SL) 0.125 MG sublingual tablet  ibuprofen (ADVIL/MOTRIN) 400 MG tablet  loperamide (IMODIUM) 2 MG capsule  loratadine (CLARITIN) 10 MG tablet  LORazepam (ATIVAN) 0.5 MG tablet  multivitamin, therapeutic (THERA-VIT) TABS tablet  OLANZapine zydis (ZYPREXA) 5 MG ODT  omeprazole (PRILOSEC) 40 MG DR capsule  ondansetron (ZOFRAN) 4 MG tablet  polyethylene glycol (MIRALAX) 17 GM/Dose powder  prochlorperazine (COMPAZINE) 10 MG tablet  promethazine (PHENERGAN) 12.5 MG tablet  sennosides (SENOKOT) 8.6 MG tablet  traZODone (DESYREL) 50 MG tablet  venlafaxine (EFFEXOR-ER) 225 MG 24 hr tablet  Vitamin D, Cholecalciferol, 25 MCG (1000 UT) TABS      Allergies   Allergen Reactions     Penicillins Rash and Unknown     Family History  Family History   Problem Relation  Age of Onset     Diabetes Type 1 Father      Cancer Paternal Grandfather      Social History   Social History     Tobacco Use     Smoking status: Every Day     Packs/day: 1.00     Years: 5.00     Pack years: 5.00     Types: Vaping Device, Cigarettes     Smokeless tobacco: Never   Substance Use Topics     Alcohol use: No     Drug use: No      Past medical history, past surgical history, medications, allergies, family history, and social history were reviewed with the patient. No additional pertinent items.      A complete review of systems was performed with pertinent positives and negatives noted in the HPI, and all other systems negative.    Physical Exam   BP: 138/86  Pulse: 107  Temp: 98  F (36.7  C)  Resp: 18  SpO2: 96 %  Physical Exam  Vitals and nursing note reviewed.   Constitutional:       General: She is not in acute distress.     Appearance: She is obese. She is not ill-appearing, toxic-appearing or diaphoretic.   HENT:      Head: Normocephalic and atraumatic.      Nose: No congestion or rhinorrhea.   Eyes:      Extraocular Movements: Extraocular movements intact.   Cardiovascular:      Rate and Rhythm: Tachycardia present.   Pulmonary:      Effort: Pulmonary effort is normal.   Musculoskeletal:         General: No deformity or signs of injury.      Cervical back: Normal range of motion.   Skin:     Coloration: Skin is not jaundiced or pale.   Neurological:      General: No focal deficit present.      Mental Status: She is alert and oriented to person, place, and time.   Psychiatric:         Attention and Perception: Attention and perception normal.         Thought Content: Thought content includes suicidal (chronic) ideation.         Cognition and Memory: Memory normal.         Judgment: Judgment is impulsive and inappropriate.           ED Course, Procedures, & Data      Procedures    Mental Health Risk Assessment      PSS-3    Date and Time Over the past 2 weeks have you felt down, depressed, or  hopeless? Over the past 2 weeks have you had thoughts of killing yourself? Have you ever attempted to kill yourself? When did this last happen? User   03/07/23 1610 yes yes yes within the last month (but not today) [facility]      C-SSRS ([place])    Date and Time Q1 Wished to be Dead (Past Month) Q2 Suicidal Thoughts (Past Month) Q3 Suicidal Thought Method Q4 Suicidal Intent without Specific Plan Q5 Suicide Intent with Specific Plan Q6 Suicide Behavior (Lifetime) Within the Past 3 Months? RETIRED: Level of Risk per Screen Screening Not Complete User   03/07/23 1610 yes yes yes no yes yes -- -- -- [facility]                Item Assessment   Suicidal Ideation Suicidal thoughts chronic   Plan cut   Intent none   Suicidal or self-harm behaviors none   Risk Factors Previous psychiatric diagnosis and treatments   Protective Factors being a new aunt, group home staff and family              No results found for any visits on 03/07/23.  Medications   OLANZapine zydis (zyPREXA) ODT tab 10 mg (has no administration in time range)     Labs Ordered and Resulted from Time of ED Arrival to Time of ED Departure - No data to display  No orders to display        Critical care was not performed.     Medical Decision Making  The patient's presentation was of mild complexity with mild to moderate exacerbation      The patient's evaluation involved:    The patient's evaluation involved:  an assessment requiring an independent historian (see separate area of note for details)  review of external note(s) from 3+ sources (see separate area of note for details)      The patient's management necessitated further care after sign-out to Dr. Olea (see their note for further management).  Treated with medication while in the ED.         Assessment & Plan    Patient was upset when got here.  Per epic review she has wants to be admitted and comes to the ED frequently.  She has chronic suicidality and per epic review has secondary gain of wanting to be  admitted. See note above. I spoke to group home staff that said she was at baseline earlier today and once she spoke to [facility] support person who told her to come to the ED, Sadaf could not be redirected into staying at the group home. The patient is well known to our er department and presents as prior visits here. She was given zyprexa 10 mg po and allowed some time to relax. She will be discharged back to her group home as admission has not been of benefit in the past.  I will sign out her case to Dr. Olea.  She has an  who is working with her on things to do such as basketball or other activities and also taking walks, etc. She should continue with this outpatient resource and will talk with them this Friday. Dec assessment not done today since one was done yesterday.      I have reviewed the nursing notes. I have reviewed the findings, diagnosis, plan and need for follow up with the patient.    New Prescriptions    No medications on file       Final diagnoses:   Intellectual disability   Borderline personality disorder (H)       Ashely Toth MD  [facility] EMERGENCY DEPARTMENT  3/7/2023     Ashely Toth MD  03/07/23 2041

## 2023-03-07 NOTE — ED NOTES
"Pt hiding face and upper extremities in sweatshirt. When asked, pt disclosed that she wants to bite herself, and doesn't \" want to be on this earth.\" Emotional support offered. Pt redirectable, and willing to take 10mg Zyprexa ODT, per MD orders. Pt unable to contract for safety. Pt remains on SIO.  "

## 2023-03-07 NOTE — ED TRIAGE NOTES
"BIB EMS. Pt endorsing auditory command hallucinations to harm self. Superficial cut to fore arm yesterday. Pt demanding to be put on \"72hr hold,\" in order to be \"admitted.\" Per EMS.     Pt reports new life stress of becoming an aunt for the first time yesterday- she reports being happy but scared. Pt reports that SI is unchanged from chronic SI concerns that are known. Pt has reddened bianca to L wrist from self inflicted bite yesterday.     Triage Assessment     Row Name 03/07/23 7639       Triage Assessment (Adult)    Airway WDL WDL       Respiratory WDL    Respiratory WDL WDL       Skin Circulation/Temperature WDL    Skin Circulation/Temperature WDL WDL       Cardiac WDL    Cardiac WDL WDL       Peripheral/Neurovascular WDL    Peripheral Neurovascular WDL WDL       Cognitive/Neuro/Behavioral WDL    Cognitive/Neuro/Behavioral WDL WDL              "

## 2023-03-07 NOTE — DISCHARGE INSTRUCTIONS
Discharge back to your group home via ambulance.     Continue taking your medications as prescribed and working with your outpatient support team.    Per recommendations made yesterday with your :  Continue with setting small goals such as going on weekly walks.  Follow up with your  via phone on Friday 3/10/23

## 2023-03-07 NOTE — DISCHARGE INSTRUCTIONS
Discharge back to group home with plans to continue with current outpatient services following up tomorrow morning with the appointment previously made.    Aftercare Plan  If I am feeling unsafe or I am in a crisis, I will:   Contact my established care providers   Call the National Suicide Prevention Lifeline: 571.267.9497   Go to the nearest emergency room   Call 911      Warning signs that I or other people might notice when a crisis is developing for me:     I am having increasing suicidal thoughts that turn to plans with intent or means  I am having additional urges to self-harm    My emotions are of hopelessness; feeling like there's no way out.  Rage or anger.  Engaging in risky activities without thinking  Withdrawing from family/friends  Dramatic mood swings  Drastic personality changes   Use of alcohol or drugs  Postings on social media  Neglect of personal hygiene or cares      Things I am able to do on my own to cope or help me feel better:    Spending quality time with loved ones  Staying hydrated  Eating balanced meals  Going for a walk every day  Take care of daily responsibilities/needs  Focus on positive self-talk vs negative self-talk     Things that I am able to do with others to cope or help me better:   Exercise  Music  Deep breathing  Meditations  Journal  Self-regulate  Self check-in  Ask for help     Things I can use or do for distraction:   Reach out to/spend time with family, friends  Shower  Exercise  Chores or do a project  Listen to music  Watch movie/TV  Listening to music  Journaling  Reading a book  Meditating  Call a friend     Changes I can make to support my mental health and wellness:    -I will abstain from all mood altering chemicals not currently prescribed to me   -I will attend scheduled mental health therapy and psychiatric appointments and follow all   recommendations  -I will commit to 30 minutes of self care daily - this can be as simple as taking a shower, going for a    walk, cooking a meal, read, writing, etc  -I will practice square breathing when I begin to feel anxious - in breath through the nose for the count   of 4 and the first line on the square. Out breath through the mouth for the count of 4 for the second line   of the square. Repeat to complete the square. Repeat the square as many times as needed.  - I will use distraction skills of: going for walks, watching TV, spending time outside, calling a friend or   family member  -Use community resources, including hotline numbers, [facility] crisis and support meetings  -Maintain a daily schedule/routine  -Practice deep breathing skills  -Download a meditation kervin and spend 15-20 minutes per day mediating/relaxing. Some apps to   download include: Calm, Headspace and Insight Timer. All 3 of these apps have free version     Reduce Extreme Emotion  QUICKLY:  Changing Your Body Chemistry      T:  Change your body Temperature to change your autonomic nervous system   Use Ice Water to calm yourself down FAST   Put your face in a bowl of ice water (this is the best way; have the person keep his/her face in ice water for 30-45 seconds - initial research is showing that the longer s/he can hold her/his face in the water, the better the response), or   Splash ice water on your face, or hold an ice pack on your face      I:  Intensely exercise to calm down a body revved up by emotion   Examples: running, walking fast, jumping, playing basketball, weight lifting, swimming, calisthenics, etc.   Engage in exercises that DO NOT include violent behaviors. Exercises that utilize violent behaviors tend to function as  behavioral rehearsal,  and rather than calming the person down, may actually  rev  the person up more, increasing the likelihood of violence, and lessening the likelihood that they will  burn off  energy     P:  Progressively relax your muscles   Starting with your hands, moving to your forearms, upper arms, shoulders, neck,  forehead, eyes, cheeks and lips, tongue and teeth, chest, upper back, stomach, buttocks, thighs, calves, ankles, feet   Tense (10 seconds,   of the way), then relax each muscle (all the way)   Notice the tension   Notice the difference when relaxed (by tensing first, and then relaxing, you are able to get a more thorough relaxation than by simply relaxing)      P: Paced breathing to relax   The standard technique is to begin with counting the number of steps one takes for a typical inhale, then counting the steps one takes for a typical exhale, and then lengthening the amount of steps for the exhalation by one or two steps.  OR  Repeat this pattern for 1-2 minutes  Inhale for four (4) seconds   Exhale for six (6) to eight (8) seconds   Research demonstrated that one can change one's overall level of anxiety by doing this exercise for even a few minutes per day       People in my life that I can ask for help:   Family  Friends  Providers     Your [facility] has a mental health crisis team you can call 24/7:   [facility] Crisis Line Number: [phone]  [facility] Mental Health Crisis: [phone] - Call the crisis line for immediate mental health support, 24 hours a day.   [facility] Crisis Line Number: [phone]  [facility] Crisis Line Number: [phone]  [facility] Crisis Line Number: [phone]   [facility] Crisis Line Number: [phone]  [place]: [phone]  [place]: [phone]  [facility] Crisis Number: [phone]  [facility] Crisis: [phone]        Other things that are important when I'm in crisis:   Ask for help     Additional resources and information:      Mental Health Apps  My3  [email or web address] [email or web address]/     VirtualHopeBox  https://[email or web address]/apps/virtual-hope-box/        Professionals or Agencies I Can Contact During A Crisis:        Crisis Lines  Crisis Text Line  Text 763839  You will be connected with a trained live  "crisis counselor to provide support.     [employer] (LGBTQ Youth Crisis Line)  6.799.456.7856  text START to 924-999     [employer] [place] [employer] Mental Illness (MAGY)  216.612.5608 or 5.668.MAGY.HELPS     National Suicide Prevention Lifeline at 4-849-549-UUOD (4509)      Throughout  [place]: call **CRISIS (**857159)      Crisis Text Line: is available for free, 24/7 by texting [place] to 590635     [employer]  Fast Tracker  Linking people to mental health and substance use disorder resources  [employer]      [place] Mental Health Warm Line  Peer to peer support  Monday thru Saturday, 12 pm to 10 pm  497.232.8610 or 1.274.693.7581  Text \"Support\" to 10058     [employer] [place] [employer] Mental Illness (www.[place].magy.org): 291.365.9884 or 817-391-0746     [facility] Phone (free remote counseling): [phone] Web address:   https://DeNA.org/      www.[employer] (filter for insurance, gender preference, etc.)     [facility]   [phone]  Intake appointment will be virtual, following appointments can be in person or virtual.   **IMMEDIATE OPENINGS**     [facility]  [phone]  *offers individual therapy, medication management and Mental Health Case Workers; can self refer     [facility]  [phone]  *Immediate Openings     [facility]  [phone]  *Immediate Openings     [facility]  [phone]  *Immediate Openings     Please follow up with scheduled providers to ensure all necessary paperwork is filled out prior to your   scheduled telehealth appointments.      Coordinators from [facility] will be calling within two business days to ensure   that you have the resources you may need or provide assistance with scheduling (Phone number: [phone].).     Remember: give the referrals 3 sessions prior to calling it quits. Do you trust them? Do you feel "   understood? Do you think they can help? Check in with yourself after each session     Please reach out to the [facility]([phone]) regarding further mental health appointment needs for this emergency department visit.     [facility] SCHEDULING:  Today you were seen by a licensed mental health professional through [facility], [facility] ([facility])  for a crisis assessment in the Emergency Department at [facility].  It is recommended that you follow up with your estabished providers (psychiatrist, menta health therapist, and/or primary care doctor - as relevant) as soon as possible. Coordinators from [facility] will be calling you in the next 24-48 hours to ensure that you have the resources you need.  You can so contact [facility] coordinators directly at [phone].     [facility] maintains an extensive network of licensed behavioral health providers to connect patients with the services they need.  We do not charge providers a fee to participate in our referral network.  We match patients with providers based on a patient s specific needs, insurance coverage, and location.  Our first effort will be to refer you to a provider within your care system, and will utilize providers outside your care system as needed.

## 2023-03-07 NOTE — CONSULTS
Diagnostic Evaluation Consultation  Crisis Assessment    Patient was assessed: In Person  Patient location: [facility]  Was a release of information signed: No. Reason: no need, no changes      Referral Data and Chief Complaint  Sadaf Ross is a 22 year old, who uses she/her pronouns, and presents to the ED via EMS. Patient is referred to the ED by self. Patient is presenting to the ED for the following concerns: self-harm, suicidal ideation.      Informed Consent and Assessment Methods     Patient is her own guardian. Writer met with patient and explained the crisis assessment process, including applicable information disclosures and limits to confidentiality, assessed understanding of the process, and obtained consent to proceed with the assessment. Patient was observed to be able to participate in the assessment as evidenced by patient's ability to answer questions. Assessment methods included conducting a formal interview with patient, review of medical records, collaboration with medical staff, and obtaining relevant collateral information from family and community providers when available..     Over the course of this crisis assessment provided reassurance, offered validation, engaged patient in problem solving and disposition planning, worked with patient on safety and aftercare planning and assisted in processing patient's thoughts and feeling relating to birth of her nephew. Patient's response to interventions was cooperative and engaged.     Summary of Patient Situation     Pt was a walk-in at [facility] where she saw an LMFT regarding the suicidal ideation she was experiencing. The pt escalated while there and was instructed to go to the ED if she continued to feel dysregulated. The pt told the LMFT provider that she could arrange her own transportation to the ED and that when she leaves the ED she no longer wants to self-harm. The pt was scheduled a follow-up with  "Lynne Sawyer Tannerdavidveronica, who she recently started trauma therapy with at [facility], for tomorrow at 9:00 am. When the pt left the walk-in  clinic earlier today she stated her plan was to \"go to the ED and then come to the clinic tomorrow.\"     The pt stated she was experiencing stress about becoming an aunt and that she had gotten a text from her sister this morning stating her nephew had been born. Reviewed the many coping skills that the pt has learned and discussed her follow-up appt the next morning. The pt stated she felt comfortable returning to the group home and that she needed to talk about her negative feelings.    The pt has a psychiatric history significant for borderline personality disorder, intellectual disability, and bipolar I. The pt has an extensive hx of using the ED inappropriately and an Emergency Department Care Plan is being sought to reduce and extinguish the pt's numerous ED visits.      Brief Psychosocial History     Pt states she lives in a group home with 24/7 staffing and has been there for the last 2 years. The pt is her own guardian. Pt states her father passed away approximately 2 years ago and she has struggled with grief since that time. The pt reports her mom and sister live in [place]. Pt has a supportive family and established outpatient supports in place. Pt denies legal issues and is not employed.    Significant Clinical History     Pt has an extensive hx of ED visits, with the most recent being yesterday, 3/5/2023, at [facility]. Pt typically presents regarding various chief complaints, seeking secondary gain from attention in the ED and trying to connect with other patients. Pt has a hx of inpatient hospitalizations and has been encouraged to speak to staff at her group home before calling the ED. Patient has a care plan at her group home, and one in place at [facility] ED:    The pt has become increasingly disruptive in the Emergency Department.  The pt will yell, demand, and " scream and disrupt the milieu, and this happens before she finds out the recommendation to send her back to her group home.  Today, the pt postured toward the ED doctor and threatened to  punch him.   During one of her ED visits over the past week, the pt befriended another patient who is waiting for an inpatient bed.  The pt has been texting and contacting this patient.  There is concern that one of the reasons the pt is coming to the ED is to interact with this patient.        An Emergency Department Care Plan is being sought in order to reduce and extinguish the pt s numerous ED visits.  The pt does have an active outpatient team that the pt can utilize for support and lives in a group home with 24/7 staffing.  Typically, the pt calls 911, not group home staff.  Additionally, [facility] have attempted to recommend additionally services, such as day treatment, and the pt refuses the recommendations.  It appears that the pt comes to the ED, requesting admission as she does not like her group home.        To that end, it is recommended that if the pt returns to the Emergency Department, she be triaged and if there is not any acute new symptoms, both medically and mental health-wise, the group home be called and informed that the pt is returning and medical transport be set up for her return.  If the pt becomes dysregulated, the pt should be offered/given appropriate medications.  This should not hinder her return to her group home.     Collateral Information  The write left a VM at group [place]. Reviewed Epic.     Risk Assessment    [place] Suicide Severity Rating Scale Since Last Contact: 3/6/2023  Suicidal Ideation (Since Last Contact)  1. Wish to be Dead (Since Last Contact): Yes  2. Non-Specific Active Suicidal Thoughts (Since Last Contact): Yes  3. Active Suicidal Ideation with any Methods (Not Plan) Without Intent to Act (Since Last Contact): Yes  4. Active Suicidal Ideation with Some Intent to Act, Without Specific  Plan (Since Last Contact): Yes  5. Active Suicidal Ideation with Specific Plan and Intent (Since Last Contact): Yes  Suicidal Behavior (Since Last Contact)  Actual Attempt (Since Last Contact): No  Has subject engaged in non-suicidal self-injurious behavior? (Since Last Contact): Yes  Interrupted Attempts (Since Last Contact): No  Aborted or Self-Interrupted Attempt (Since Last Contact): No  Preparatory Acts or Behavior (Since Last Contact): No  Suicide (Since Last Contact): No     C-SSRS Risk (Since Last Contact)  Calculated C-SSRS Risk Score (Since Last Contact): High Risk     Validity of evaluation is impacted by presenting factors during interview.   Comments regarding subjective versus objective responses to [place] tool: none. See clinical narrative  Environmental or Psychosocial Events: loss of a loved one, helplessness/hopelessness, impulsivity/recklessness and anniversary of traumatizing events  Chronic Risk Factors: history of psychiatric hospitalization, serious, persistent mental illness, personality disorders and history of Non-Suicidal Self Injury (NSSI)   Warning Signs: hopelessness, anxiety, agitation, unable to sleep, sleeping all the time and recent discharges from emergency department or inpatient psychiatric care  Protective Factors: strong bond to family unit, community support, or employment, lives in a responsibly safe and stable environment, good treatment engagement, sense of importance of health and wellness, able to access care without barriers, supportive ongoing medical and mental health care relationships and help seeking  Interpretation of Risk Scoring, Risk Mitigation Interventions and Safety Plan:  Pt is suicidal at her baseline, pt is not endorsing current SI and states she is okay going back to group home. She is able to engage in safety planning and group [place] has a safety plan with her, including they are staffed 24/7 and are able to support pt.     Does the patient have  thoughts of harming others? No     Is the patient engaging in sexually inappropriate behavior?  no        Current Substance Abuse     Is there recent substance abuse? no     Was a urine drug screen or blood alcohol level obtained: No       Mental Status Exam     Affect: Flat   Appearance: Appropriate    Attention Span/Concentration: Attentive  Eye Contact: Variable   Fund of Knowledge: Delayed    Language /Speech Content: Fluent   Language /Speech Volume: Normal    Language /Speech Rate/Productions: Normal    Recent Memory: Variable   Remote Memory: Variable   Mood: Sad    Orientation to Person: Yes    Orientation to Place: Yes   Orientation to Time of Day: Yes    Orientation to Date: Yes    Situation (Do they understand why they are here?): Yes    Psychomotor Behavior: Normal    Thought Content: Clear   Thought Form: Goal Directed      History of commitment: No       Medication    Psychotropic medications:   Yes. Pt is currently taking   No current facility-administered medications for this encounter.             Current Outpatient Medications   Medication     acetaminophen (TYLENOL) 325 MG tablet     alum & mag hydroxide-simethicone (MAALOX  ES) 400-400-40 MG/5ML SUSP suspension     ARIPiprazole lauroxil ER (ARISTADA) 882 MG/3.2ML intra-muscular     benzonatate (TESSALON) 200 MG capsule     benztropine (COGENTIN) 1 MG tablet     calcium carbonate (TUMS) 500 MG chewable tablet     clobetasol (TEMOVATE) 0.05 % CREA cream     cyclobenzaprine (FLEXERIL) 10 MG tablet     diclofenac (VOLTAREN) 1 % topical gel     docusate sodium (COLACE) 50 MG capsule     fluticasone (FLONASE) 50 MCG/ACT nasal spray     guaiFENesin (MUCINEX) 600 MG 12 hr tablet     hydrocortisone (CORTAID) 0.5 % external cream     hydrOXYzine (ATARAX) 50 MG tablet     hyoscyamine (LEVSIN/SL) 0.125 MG sublingual tablet     ibuprofen (ADVIL/MOTRIN) 400 MG tablet     loperamide (IMODIUM) 2 MG capsule     LORazepam (ATIVAN) 0.5 MG tablet     multivitamin,  "therapeutic (THERA-VIT) TABS tablet     OLANZapine zydis (ZYPREXA) 5 MG ODT     omeprazole (PRILOSEC) 40 MG DR capsule     ondansetron (ZOFRAN) 4 MG tablet     polyethylene glycol (MIRALAX) 17 GM/Dose powder     prochlorperazine (COMPAZINE) 10 MG tablet     promethazine (PHENERGAN) 12.5 MG tablet     sennosides (SENOKOT) 8.6 MG tablet     traZODone (DESYREL) 50 MG tablet     venlafaxine (EFFEXOR-ER) 225 MG 24 hr tablet     Vitamin D, Cholecalciferol, 25 MCG (1000 UT) TABS   Medication compliant: Yes. Recent medication changes: No  Medication changes made in the last two weeks: No         Current Care Team    Primary Care Provider: Jess Wilkins MD - [facility]  Psychiatrist: Emma Jacobson NP - [facility]  Therapist: Rd Barton MA, [facility] - [facility], and is starting trauma therapy with Lynne Alexander soon.  : Yes  Other: Group home.      Diagnosis    301.83 (F60.3) Borderline Personality Disorder   Intellectual Disabilites  319 (F79) Unspecified Intellectual Disability (Intellectual Developmental Disorder) - by history   296.50 Bipolar I Disorder Current or Most Recent Episode Depressed, unspecified - by history     Clinical Summary and Substantiation of Recommendations    Pt was a walk-in at [facility] where she saw an LMFT regarding the suicidal ideation she was experiencing. The pt escalated while there and was instructed to go to the ED if she continued to feel dysregulated. The pt told the LMFT provider that she could arrange her own transportation to the ED and that when she leaves the ED she no longer wants to self-harm. The pt was scheduled a follow-up with Lynne Alexander, who she recently started trauma therapy with at [facility], for tomorrow at 9:00 am. When the pt left the walk-in MH clinic earlier today she stated her plan was to \"go to the ED and then come to the clinic tomorrow.\"   The pt stated she was experiencing stress about becoming an aunt and that she had gotten a " text from her sister this morning stating her nephew had been born. Reviewed the many coping skills that the pt has learned and discussed her follow-up appt the next morning. The pt stated she felt comfortable returning to the group home and that she needed to talk about her negative feelings.  The pt has a psychiatric history significant for borderline personality disorder, intellectual disability, and bipolar I. The pt has an extensive hx of using the ED inappropriately and an Emergency Department Care Plan is being sought to reduce and extinguish the pt's numerous ED visits.  Pt is currently stable and behaviors are consistent with the pt's baseline. Pt has 24/7 staffing at her group home and a follow-up therapy appt in the morning. Pt discharge recommended, pt agrees with recommendation and agrees with GH return. Dr. Olea consulted and agrees with recommended disposition.  Disposition    Recommended disposition: Other: discharged back to existing group home       Reviewed case and recommendations with attending provider. Attending Name: Dr. Olea        Attending concurs with disposition: Yes       Patient concurs with disposition: Yes       Guardian concurs with disposition: NA      Final disposition: Other: pt to return to existing group home.       Outpatient Details (if applicable):   Aftercare plan and appointments placed in the AVS and provided to patient: Yes. Given to patient by nurse, Michelle PANTOJA    Was lethal means counseling provided as a part of aftercare planning? Yes - describe; Pt has a safety plan in place at her group home including restricting access to sharps and medications.      Assessment Details    Patient interview started at: 7:35 PM  and completed at: 8:15 PM.     Total duration spent on the patient case in minutes: .75 hrs      CPT code(s) utilized: 60405 - Psychotherapy for Crisis - 60 (30-74*) min       ELENA HUBBARD, Psychotherapist Trainee, Psychotherapist  [facility] -  Triage & Transition Services  Callback: [phone]      Aftercare Plan  If I am feeling unsafe or I am in a crisis, I will:   Contact my established care providers   Call the National Suicide Prevention Lifeline: 799.887.9413   Go to the nearest emergency room   Call 910      Warning signs that I or other people might notice when a crisis is developing for me:     I am having increasing suicidal thoughts that turn to plans with intent or means  I am having additional urges to self-harm    My emotions are of hopelessness; feeling like there's no way out.  Rage or anger.  Engaging in risky activities without thinking  Withdrawing from family/friends  Dramatic mood swings  Drastic personality changes   Use of alcohol or drugs  Postings on social media  Neglect of personal hygiene or cares      Things I am able to do on my own to cope or help me feel better:    Spending quality time with loved ones  Staying hydrated  Eating balanced meals  Going for a walk every day  Take care of daily responsibilities/needs  Focus on positive self-talk vs negative self-talk     Things that I am able to do with others to cope or help me better:   Exercise  Music  Deep breathing  Meditations  Journal  Self-regulate  Self check-in  Ask for help     Things I can use or do for distraction:   Reach out to/spend time with family, friends  Shower  Exercise  Chores or do a project  Listen to music  Watch movie/TV  Listening to music  Journaling  Reading a book  Meditating  Call a friend     Changes I can make to support my mental health and wellness:    -I will abstain from all mood altering chemicals not currently prescribed to me   -I will attend scheduled mental health therapy and psychiatric appointments and follow all   recommendations  -I will commit to 30 minutes of self care daily - this can be as simple as taking a shower, going for a   walk, cooking a meal, read, writing, etc  -I will practice square breathing when I begin to feel  anxious - in breath through the nose for the count   of 4 and the first line on the square. Out breath through the mouth for the count of 4 for the second line   of the square. Repeat to complete the square. Repeat the square as many times as needed.  - I will use distraction skills of: going for walks, watching TV, spending time outside, calling a friend or   family member  -Use community resources, including hotline numbers, [facility] crisis and support meetings  -Maintain a daily schedule/routine  -Practice deep breathing skills  -Download a meditation kervin and spend 15-20 minutes per day mediating/relaxing. Some apps to   download include: Calm, Headspace and Insight Timer. All 3 of these apps have free version     Reduce Extreme Emotion  QUICKLY:  Changing Your Body Chemistry      T:  Change your body Temperature to change your autonomic nervous system     Use Ice Water to calm yourself down FAST     Put your face in a bowl of ice water (this is the best way; have the person keep his/her face in ice water for 30-45 seconds - initial research is showing that the longer s/he can hold her/his face in the water, the better the response), or     Splash ice water on your face, or hold an ice pack on your face      I:  Intensely exercise to calm down a body revved up by emotion     Examples: running, walking fast, jumping, playing basketball, weight lifting, swimming, calisthenics, etc.     Engage in exercises that DO NOT include violent behaviors. Exercises that utilize violent behaviors tend to function as  behavioral rehearsal,  and rather than calming the person down, may actually  rev  the person up more, increasing the likelihood of violence, and lessening the likelihood that they will  burn off  energy     P:  Progressively relax your muscles     Starting with your hands, moving to your forearms, upper arms, shoulders, neck, forehead, eyes, cheeks and lips, tongue and teeth, chest, upper back, stomach, buttocks,  thighs, calves, ankles, feet     Tense (10 seconds,   of the way), then relax each muscle (all the way)     Notice the tension     Notice the difference when relaxed (by tensing first, and then relaxing, you are able to get a more thorough relaxation than by simply relaxing)      P: Paced breathing to relax     The standard technique is to begin with counting the number of steps one takes for a typical inhale, then counting the steps one takes for a typical exhale, and then lengthening the amount of steps for the exhalation by one or two steps.  OR    Repeat this pattern for 1-2 minutes    Inhale for four (4) seconds     Exhale for six (6) to eight (8) seconds     Research demonstrated that one can change one's overall level of anxiety by doing this exercise for even a few minutes per day       People in my life that I can ask for help:   Family  Friends  Providers     Your [facility] has a mental health crisis team you can call 24/7:   [facility] Crisis Line Number: [phone]  [facility] Crisis: [phone] - Call the crisis line for immediate mental health support, 24 hours a day.   [facility] Crisis Line Number: [phone]  [facility] Crisis Line Number: [phone]  [facility] Crisis Line Number: [phone]   [facility] Crisis Line Number: [phone]  [place]: [phone]  [place]: [phone]  [facility] Crisis Number: [phone]  [facility] Crisis: [phone]        Other things that are important when I'm in crisis:   Ask for help     Additional resources and information:      Mental Health Apps  My3  https://myZayantepp.org/     VirtualHopeBox  https://Siftit.org/apps/virtual-hope-box/        Professionals or Agencies I Can Contact During A Crisis:        Crisis Lines  Crisis Text Line  Text 826520  You will be connected with a trained live crisis counselor to provide support.     The [employer] (LGBTQ Youth  "Crisis Line)  6.271.352.4545  text START to 662-585     National [place] on Mental Illness (MAGY)  666.858.5465 or 4.164.MAGY.HELPS     National Suicide Prevention Lifeline at 6-358-874-ZAWG (3522)      Throughout  [place]: call **CRISIS (**288062)      Crisis Text Line: is available for free, 24/7 by texting [place] to 138378     Community Resources  Fast Tracker  Linking people to mental health and substance use disorder resources  [employer]      [place] Mental Health Warm Line  Peer to peer support  Monday thru Saturday, 12 pm to 10 pm  302.033.5263 or 1.545.701.3667  Text \"Support\" to 94308     [place] on Mental Illness (www.[place].magy.org): 655.922.3870 or 842-690-6895     [facility] Phone (free remote counseling): 915.334.4970 Web address:   https://"[employer].org/      www.[employer] (filter for insurance, gender preference, etc.)     [facility]   [phone]  Intake appointment will be virtual, following appointments can be in person or virtual.   **IMMEDIATE OPENINGS**     [facility]  [phone]  *offers individual therapy, medication management and Mental Health Case Workers; can self refer     [facility]  [phone]  *Immediate Openings     [facility]  [phone]  *Immediate Openings     [facility]  [phone]  *Immediate Openings     Please follow up with scheduled providers to ensure all necessary paperwork is filled out prior to your   scheduled telehealth appointments.      Coordinators from [facility] will be calling within two business days to ensure   that you have the resources you may need or provide assistance with scheduling (Phone number: [phone].).     Remember: give the referrals 3 sessions prior to calling it quits. Do you trust them? Do you feel   understood? Do you think they can help? Check in with yourself after each " session     Please reach out to the [facility]([phone]) regarding further mental health appointment needs for this emergency department visit.     [facility] SCHEDULING:  Today you were seen by a licensed mental health professional through [facility], [facility] ([facility])  for a crisis assessment in the Emergency Department at [facility].  It is recommended that you follow up with your estabished providers (psychiatrist, menta health therapist, and/or primary care doctor - as relevant) as soon as possible. Coordinators from [facility] will be calling you in the next 24-48 hours to ensure that you have the resources you need.  You can so contact [facility] coordinators directly at [phone].     [facility] maintains an extensive network of licensed behavioral health providers to connect patients with the services they need.  We do not charge providers a fee to participate in our referral network.  We match patients with providers based on a patient s specific needs, insurance coverage, and location.  Our first effort will be to refer you to a provider within your care system, and will utilize providers outside your care system as needed.

## 2023-03-07 NOTE — TELEPHONE ENCOUNTER
"Pt states \"I've been hearing voices of killing myself.\"  \"It tells me how to do it and where to do it.\"  No self harm currently.  No weapons or sharps available.  Pt agrees to conference-call 911 for paramedics to transport her to ED.  Now conference-calling 911 together.  Reached dispatcher; ambulance is on the way.  Remained on phone with pt, discussing the new baby her aunt just delivered.  Discussed medication adjustments can help tremendously and may be needed.  Pt verbalizes agreement; no severe distress noted.  Pt states \"The paramedics are here.\"    Laina [facility] Nurse Advisor     Reason for Disposition    Patient is threatening suicide now    Protocols used: SUICIDE [id number]      "

## 2023-03-09 ENCOUNTER — HOSPITAL ENCOUNTER (EMERGENCY)
Facility: CLINIC | Age: 23
Discharge: HOME | End: 2023-03-09
Attending: PSYCHIATRY & NEUROLOGY | Admitting: PSYCHIATRY & NEUROLOGY
Payer: MEDICARE

## 2023-03-09 VITALS
SYSTOLIC BLOOD PRESSURE: 120 MMHG | DIASTOLIC BLOOD PRESSURE: 81 MMHG | HEART RATE: 81 BPM | OXYGEN SATURATION: 98 % | RESPIRATION RATE: 16 BRPM | TEMPERATURE: 98.6 F

## 2023-03-09 DIAGNOSIS — F60.3 BORDERLINE PERSONALITY DISORDER (H): ICD-10-CM

## 2023-03-09 DIAGNOSIS — F79 INTELLECTUAL DISABILITY: ICD-10-CM

## 2023-03-09 PROCEDURE — 96372 THER/PROPH/DIAG INJ SC/IM: CPT | Performed by: PSYCHIATRY & NEUROLOGY

## 2023-03-09 PROCEDURE — 99284 EMERGENCY DEPT VISIT MOD MDM: CPT | Performed by: PSYCHIATRY & NEUROLOGY

## 2023-03-09 PROCEDURE — 250N000011 HC RX IP 250 OP 636: Performed by: PSYCHIATRY & NEUROLOGY

## 2023-03-09 PROCEDURE — 90791 PSYCH DIAGNOSTIC EVALUATION: CPT

## 2023-03-09 PROCEDURE — 99285 EMERGENCY DEPT VISIT HI MDM: CPT | Mod: 25 | Performed by: PSYCHIATRY & NEUROLOGY

## 2023-03-09 RX ORDER — OLANZAPINE 10 MG/2ML
10 INJECTION, POWDER, FOR SOLUTION INTRAMUSCULAR ONCE
Status: COMPLETED | OUTPATIENT
Start: 2023-03-09 | End: 2023-03-09

## 2023-03-09 RX ADMIN — OLANZAPINE 10 MG: 10 INJECTION, POWDER, LYOPHILIZED, FOR SOLUTION INTRAMUSCULAR at 18:33

## 2023-03-09 ASSESSMENT — COLUMBIA-SUICIDE SEVERITY RATING SCALE - C-SSRS
TOTAL  NUMBER OF INTERRUPTED ATTEMPTS SINCE LAST CONTACT: NO
SUICIDE, SINCE LAST CONTACT: NO
TOTAL  NUMBER OF ABORTED OR SELF INTERRUPTED ATTEMPTS SINCE LAST CONTACT: NO
ATTEMPT SINCE LAST CONTACT: NO
5. HAVE YOU STARTED TO WORK OUT OR WORKED OUT THE DETAILS OF HOW TO KILL YOURSELF? DO YOU INTEND TO CARRY OUT THIS PLAN?: NO
REASONS FOR IDEATION SINCE LAST CONTACT: COMPLETELY TO GET ATTENTION, REVENGE, OR A REACTION FROM OTHERS
1. SINCE LAST CONTACT, HAVE YOU WISHED YOU WERE DEAD OR WISHED YOU COULD GO TO SLEEP AND NOT WAKE UP?: YES
6. HAVE YOU EVER DONE ANYTHING, STARTED TO DO ANYTHING, OR PREPARED TO DO ANYTHING TO END YOUR LIFE?: NO
1. SINCE LAST CONTACT, HAVE YOU WISHED YOU WERE DEAD OR WISHED YOU COULD GO TO SLEEP AND NOT WAKE UP?: WANTS TO DIE
2. HAVE YOU ACTUALLY HAD ANY THOUGHTS OF KILLING YOURSELF?: YES

## 2023-03-09 ASSESSMENT — ACTIVITIES OF DAILY LIVING (ADL): ADLS_ACUITY_SCORE: 37

## 2023-03-09 NOTE — ED PROVIDER NOTES
ED Provider Note  [facility]      History     Chief Complaint   Patient presents with     Suicidal     HPI  Sadaf Ross is a 22 year old female with a PMH of borderline personality disorder, bipolar 1, intellectual disability, ADHD, depression and suicidal ideation who presents to the ED today reporting suicidal ideation. Patient presents to ED via medical cab from her group home. She is well-known to us due to her multiple ED visits.  Patient had several appointments today including a therapy appointment, a drop-in appointment with medication provider, and an urgent care visit reporting ongoing nausea and stomach concerns.    On initial presentation patient reports she is happy and scared.  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  Patient shows this writer a photo of infant and appears happy and excited.  Patient quickly transitions to reports of feeling suicidal, stating I just want to die, reporting she is feeling that she is seeing demons and living in hell.  She reports persistent auditory hallucinations telling her to bite herself, to end her life.  Patient looks blankly at this writer and reports I want to bite myself, states she does not want to ignore voices in her head.      Patient did not have these experience earlier today when she met with the therapist or urgent care provider or med provider. She seems to not have these experiences when she sees her outpatient providers or services. Here patient states she wants to be hospitalized to help her. She struggles with not getting any attention at her group home, and typically presents here wanting admission to get her needs met.    Patient admits to being able to return to her group home, but that she still wants to die. This is chronic concern for her.  She  states repeatedly a desire to engage in self-injurious behavior by biting also reports her plan for suicide is to bite  herself.  Patient is redirected for short periods of time however often returns to making statements of ending her life.  Current behavior and presentation is consistent with baseline for patient, with increased agitation and irritability presenting itself in context of returning to group home.    Patient started to act out when she was declined admission. She had a behavioral code and was given Zyprexa 10 mg IM which has calmed her down. She appears in emotional and behavioral control during my evaluation.       Past Medical History  Past Medical History:   Diagnosis Date     ADHD (attention deficit hyperactivity disorder)      Bipolar 1 disorder (H)      Borderline personality disorder (H)      Depression      Depressive disorder      Intellectual disability      Obesity      Syncope      Past Surgical History:   Procedure Laterality Date     APPENDECTOMY       APPENDECTOMY       acetaminophen (TYLENOL) 325 MG tablet  alum & mag hydroxide-simethicone (MAALOX  ES) 400-400-40 MG/5ML SUSP suspension  ARIPiprazole lauroxil ER (ARISTADA) 882 MG/3.2ML intra-muscular  benzonatate (TESSALON) 200 MG capsule  benztropine (COGENTIN) 1 MG tablet  bisacodyl (DULCOLAX) 5 MG EC tablet  calcium carbonate (TUMS) 500 MG chewable tablet  clobetasol (TEMOVATE) 0.05 % CREA cream  cyclobenzaprine (FLEXERIL) 10 MG tablet  diclofenac (VOLTAREN) 1 % topical gel  docusate sodium (COLACE) 50 MG capsule  fluticasone (FLONASE) 50 MCG/ACT nasal spray  guaiFENesin (MUCINEX) 600 MG 12 hr tablet  hydrocortisone (CORTAID) 0.5 % external cream  hydrOXYzine (ATARAX) 50 MG tablet  hyoscyamine (LEVSIN/SL) 0.125 MG sublingual tablet  ibuprofen (ADVIL/MOTRIN) 400 MG tablet  loperamide (IMODIUM) 2 MG capsule  loratadine (CLARITIN) 10 MG tablet  LORazepam (ATIVAN) 0.5 MG tablet  multivitamin, therapeutic (THERA-VIT) TABS tablet  OLANZapine zydis (ZYPREXA) 5 MG ODT  omeprazole (PRILOSEC) 40 MG DR capsule  ondansetron (ZOFRAN) 4 MG tablet  polyethylene  glycol (MIRALAX) 17 GM/Dose powder  prochlorperazine (COMPAZINE) 10 MG tablet  promethazine (PHENERGAN) 12.5 MG tablet  sennosides (SENOKOT) 8.6 MG tablet  traZODone (DESYREL) 50 MG tablet  venlafaxine (EFFEXOR-ER) 225 MG 24 hr tablet  Vitamin D, Cholecalciferol, 25 MCG (1000 UT) TABS      Allergies   Allergen Reactions     Penicillins Rash and Unknown     Family History  Family History   Problem Relation Age of Onset     Diabetes Type 1 Father      Cancer Paternal Grandfather      Social History   Social History     Tobacco Use     Smoking status: Every Day     Packs/day: 1.00     Years: 5.00     Pack years: 5.00     Types: Vaping Device, Cigarettes     Smokeless tobacco: Never   Substance Use Topics     Alcohol use: No     Drug use: No         A medically appropriate review of systems was performed with pertinent positives and negatives noted in the HPI, and all other systems negative.    Physical Exam   BP: 135/88  Pulse: 107  Temp: 98.6  F (37  C)  Resp: 15  SpO2: 97 %  Physical Exam  Vitals and nursing note reviewed.   HENT:      Head: Normocephalic.   Eyes:      Pupils: Pupils are equal, round, and reactive to light.   Pulmonary:      Effort: Pulmonary effort is normal.   Musculoskeletal:         General: Normal range of motion.      Cervical back: Normal range of motion.   Neurological:      General: No focal deficit present.      Mental Status: She is alert.   Psychiatric:         Attention and Perception: Attention and perception normal. She does not perceive auditory or visual hallucinations.         Mood and Affect: Mood normal. Affect is blunt.         Speech: Speech normal.         Behavior: Behavior normal. Behavior is not agitated, aggressive, hyperactive or combative. Behavior is cooperative.         Thought Content: Thought content normal. Thought content is not paranoid or delusional. Thought content does not include homicidal or suicidal ideation.         Cognition and Memory: Cognition and memory  normal.         Judgment: Judgment is impulsive and inappropriate.           ED Course, Procedures, & Data      Procedures       ED Course Selections: A consult was attained from the  DEC service. The case was discussed with Michelle Lewis from that service. The consulting service's recommendations were provided at 7 PM.  10 minutes spent discussing case, care and disposition.    10 minutes spent reviewing prior records and interventions, including outpatient visits today with established care providers and services.      Mental Health Risk Assessment      PSS-3    Date and Time Over the past 2 weeks have you felt down, depressed, or hopeless? Over the past 2 weeks have you had thoughts of killing yourself? Have you ever attempted to kill yourself? When did this last happen? User   03/09/23 1708 yes yes no -- AAB      C-SSRS ([place])    Date and Time Q1 Wished to be Dead (Past Month) Q2 Suicidal Thoughts (Past Month) Q3 Suicidal Thought Method Q4 Suicidal Intent without Specific Plan Q5 Suicide Intent with Specific Plan Q6 Suicide Behavior (Lifetime) Within the Past 3 Months? RETIRED: Level of Risk per Screen Screening Not Complete User   03/09/23 1708 yes yes yes yes yes no -- -- -- AAB              Suicide assessment completed by mental health (D.E.C., LCSW, etc.)       No results found for any visits on 03/09/23.  Medications   OLANZapine (zyPREXA) injection 10 mg (10 mg Intramuscular $Given 3/9/23 4327)     Labs Ordered and Resulted from Time of ED Arrival to Time of ED Departure - No data to display  No orders to display          Critical care was not performed.     Medical Decision Making  The patient's presentation was of moderate complexity (a chronic illness mild to moderate exacerbation, progression, or side effect of treatment).    The patient's evaluation involved:  an assessment requiring an independent historian (see separate area of note for details)  review of external note(s) from 3+ sources (see  separate area of note for details)    The patient's management necessitated moderate risk (limitations due to social determinants of health (see separate area of note for details)), high risk (drug therapy requiring intensive monitoring (see separate area of note for details)) and high risk (a decision regarding hospitalization).      Assessment & Plan    Patient is here, coming in via cab from her group home as she feels suicidal and wanting admission. Patient is well-known to us due to her multiple visits previously. She resides in a group home and has supportive services in the community but still feels she needs inpatient services to see to her needs. There is no altered mental status or psychosis. Patient exhibits manipulative behavior and seeks secondary gain to get her needs met in the hospital. An admission is inappropriate in this situation and will only enable her further with excessive use of the ED and hospital. Patient appears at baseline and can be discharged back to her group home. She is encouraged to work with her outpatient services for ongoing support.    I have reviewed the nursing notes. I have reviewed the findings, diagnosis, plan and need for follow up with the patient.    Discharge Medication List as of 3/9/2023  7:47 PM          Final diagnoses:   Intellectual disability   Borderline personality disorder (H)       Magno Sena MD  [facility] EMERGENCY DEPARTMENT  3/9/2023     Magno Sena MD  03/09/23 2015

## 2023-03-09 NOTE — ED TRIAGE NOTES
Pt came in by cab. Pt states she is feeling suicidal, as it is her baseline but has a plan. Pt states things are really stressful at home and needs to talk to someone.      Triage Assessment     Row Name 03/09/23 1120       Triage Assessment (Adult)    Airway WDL WDL       Respiratory WDL    Respiratory WDL WDL       Skin Circulation/Temperature WDL    Skin Circulation/Temperature WDL WDL       Cardiac WDL    Cardiac WDL WDL       Peripheral/Neurovascular WDL    Peripheral Neurovascular WDL WDL       Cognitive/Neuro/Behavioral WDL    Cognitive/Neuro/Behavioral WDL WDL

## 2023-03-10 ENCOUNTER — HOSPITAL ENCOUNTER (EMERGENCY)
Facility: CLINIC | Age: 23
Discharge: HOME | End: 2023-03-10
Attending: PSYCHIATRY & NEUROLOGY | Admitting: PSYCHIATRY & NEUROLOGY
Payer: MEDICARE

## 2023-03-10 VITALS
WEIGHT: 244 LBS | BODY MASS INDEX: 41.66 KG/M2 | SYSTOLIC BLOOD PRESSURE: 96 MMHG | RESPIRATION RATE: 16 BRPM | HEART RATE: 92 BPM | OXYGEN SATURATION: 98 % | HEIGHT: 64 IN | TEMPERATURE: 98.4 F | DIASTOLIC BLOOD PRESSURE: 59 MMHG

## 2023-03-10 DIAGNOSIS — F60.3 BORDERLINE PERSONALITY DISORDER (H): ICD-10-CM

## 2023-03-10 DIAGNOSIS — F79 INTELLECTUAL DISABILITY: ICD-10-CM

## 2023-03-10 LAB
AMPHETAMINES UR QL SCN: NORMAL
BARBITURATES UR QL SCN: NORMAL
BENZODIAZ UR QL SCN: NORMAL
BZE UR QL SCN: NORMAL
CANNABINOIDS UR QL SCN: NORMAL
OPIATES UR QL SCN: NORMAL
SARS-COV-2 RNA RESP QL NAA+PROBE: NEGATIVE

## 2023-03-10 PROCEDURE — 80307 DRUG TEST PRSMV CHEM ANLYZR: CPT | Performed by: PSYCHIATRY & NEUROLOGY

## 2023-03-10 PROCEDURE — 90791 PSYCH DIAGNOSTIC EVALUATION: CPT

## 2023-03-10 PROCEDURE — 99285 EMERGENCY DEPT VISIT HI MDM: CPT | Mod: 25 | Performed by: PSYCHIATRY & NEUROLOGY

## 2023-03-10 PROCEDURE — U0005 INFEC AGEN DETEC AMPLI PROBE: HCPCS | Performed by: PSYCHIATRY & NEUROLOGY

## 2023-03-10 PROCEDURE — C9803 HOPD COVID-19 SPEC COLLECT: HCPCS | Performed by: PSYCHIATRY & NEUROLOGY

## 2023-03-10 PROCEDURE — 99284 EMERGENCY DEPT VISIT MOD MDM: CPT | Performed by: PSYCHIATRY & NEUROLOGY

## 2023-03-10 ASSESSMENT — COLUMBIA-SUICIDE SEVERITY RATING SCALE - C-SSRS
6. HAVE YOU EVER DONE ANYTHING, STARTED TO DO ANYTHING, OR PREPARED TO DO ANYTHING TO END YOUR LIFE?: NO
TOTAL  NUMBER OF ABORTED OR SELF INTERRUPTED ATTEMPTS SINCE LAST CONTACT: NO
SUICIDE, SINCE LAST CONTACT: NO
5. HAVE YOU STARTED TO WORK OUT OR WORKED OUT THE DETAILS OF HOW TO KILL YOURSELF? DO YOU INTEND TO CARRY OUT THIS PLAN?: NO
1. SINCE LAST CONTACT, HAVE YOU WISHED YOU WERE DEAD OR WISHED YOU COULD GO TO SLEEP AND NOT WAKE UP?: YES
2. HAVE YOU ACTUALLY HAD ANY THOUGHTS OF KILLING YOURSELF?: YES
TOTAL  NUMBER OF INTERRUPTED ATTEMPTS SINCE LAST CONTACT: NO
ATTEMPT SINCE LAST CONTACT: NO
REASONS FOR IDEATION SINCE LAST CONTACT: COMPLETELY TO GET ATTENTION, REVENGE, OR A REACTION FROM OTHERS

## 2023-03-10 ASSESSMENT — ACTIVITIES OF DAILY LIVING (ADL)
ADLS_ACUITY_SCORE: 37
ADLS_ACUITY_SCORE: 37

## 2023-03-10 NOTE — CONSULTS
Diagnostic Evaluation Consultation  Crisis Assessment    Patient was assessed: In Person  Patient location: [facility] ED  Was a release of information signed: No. Reason: no changes      Referral Data and Chief Complaint  Sadaf Ross is a 22 year old, who uses she/her pronouns, and presents to the ED other: medical cab. Patient is referred to the ED by self. Patient is presenting to the ED for the following concerns: suicidal ideation..      Informed Consent and Assessment Methods     Patient is her own guardian. Writer met with patient and explained the crisis assessment process, including applicable information disclosures and limits to confidentiality, assessed understanding of the process, and obtained consent to proceed with the assessment. Patient was observed to be able to participate in the assessment as evidenced by cooperating with interview. Assessment methods included conducting a formal interview with patient, review of medical records, collaboration with medical staff, and obtaining relevant collateral information from family and community providers when available..     Over the course of this crisis assessment provided reassurance, offered validation, engaged patient in problem solving and disposition planning and worked with patient on safety and aftercare planning. Patient's response to interventions was frustrated, guarded.     Summary of Patient Situation  Patient presents to ED via medical cab for suicidal ideation.  Patient had several appointments today including a therapy appointment, a drop-in appointment with medication provider, and an urgent care visit reporting ongoing nausea and stomach concerns.  Patient is well-known to the ED with PMH of borderline personality disorder, intellectual disability, and bipolar 1 disorder.  On initial presentation patient reports she is happy and scared.  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  Patient shows  this writer a photo of infant and appears happy and excited.  Patient quickly transitions to reports of feeling suicidal, stating I just want to die, reporting she is feeling that she is seeing demons and living in hell.  Reports persistent auditory hallucinations telling her to bite herself, to end her life.  Patient looks blankly at this writer and reports I want to bite myself, states she does not want to ignore voices in her head.  Patient states she wants the hospital to help her however continues to state all she wants is to return to her group home and die.  States repeatedly had desire to engage in self-injurious behavior by biting also reports her plan for suicide is to bite herself.  Patient is redirected for short periods of time however often returns to making statements of ending her life.  Current behavior and presentation is consistent with baseline for patient, with increased agitation and irritability presenting itself in context of returning to group home.    Brief Psychosocial History  Patient resides in a group home with 24/ 7 staffing.  She remains her own guardian.  Patient's father passed away approximately 2 years ago continues to struggle and blame herself for father's death, suggesting she caused him stress which led to his death.  Patient reports father  from brain infection.  Patient's support system includes her mother, and siblings.  Has established mental health care through [facility] clinic.    Significant Clinical History  Patient has history of multiple ED visits with most recent being 2023.  Often reports to the ED with multiple complaints including suicidality, desire to engage in self-injurious behavior, auditory hallucinations.  Has history of inpatient hospitalization, last occurring 2021 at [facility].  Patient receives 24/7 staffing through a group home.  She has established medication management, therapy and case management services.  Patient has a care plan at her  group home and 1 in place at [facility] ED;    The pt has become increasingly disruptive in the Emergency Department.  The pt will yell, demand, and scream and disrupt the milieu, and this happens before she finds out the recommendation to send her back to her group home.  Today, the pt postured toward the ED doctor and threatened to  punch him.   During one of her ED visits over the past week, the pt befriended another patient who is waiting for an inpatient bed.  The pt has been texting and contacting this patient.  There is concern that one of the reasons the pt is coming to the ED is to interact with this patient.        An Emergency Department Care Plan is being sought in order to reduce and extinguish the pt s numerous ED visits.  The pt does have an active outpatient team that the pt can utilize for support and lives in a group home with 24/7 staffing.  Typically, the pt calls 911, not group home staff.  Additionally, [facility] have attempted to recommend additionally services, such as day treatment, and the pt refuses the recommendations.  It appears that the pt comes to the ED, requesting admission as she does not like her group home.        To that end, it is recommended that if the pt returns to the Emergency Department, she be triaged and if there is not any acute new symptoms, both medically and mental health-wise, the group home be called and informed that the pt is returning and medical transport be set up for her return.  If the pt becomes dysregulated, the pt should be offered/given appropriate medications.  This should not hinder her return to her group home.        9/27/22, [facility]; Dr. Luis De Anda; [facility]       Collateral Information  Collateral contact received from group home staff, at phone #[phone].  Staff report patient has been using Medi cabs throughout the day to go to various providers.  Report patient had therapy appointment today, sawmedication manager and  visited urgent care prior to coming to the emergency department.  Patient's behavior continues to be consistent with baseline, group home was able to take patient back once cleared from emergency department.    Risk Assessment  Henderson Suicide Severity Rating Scale Since Last Contact: 3/9/23  Suicidal Ideation (Since Last Contact)  1. Wish to be Dead (Since Last Contact): Yes  Wish to be Dead Description (Since Last Contact): wants to die  2. Non-Specific Active Suicidal Thoughts (Since Last Contact): Yes  3. Active Suicidal Ideation with any Methods (Not Plan) Without Intent to Act (Since Last Contact): Yes  Active Suicidal Ideation with any Methods (Not Plan) Description (Since Last Contact): biting self  4. Active Suicidal Ideation with Some Intent to Act, Without Specific Plan (Since Last Contact): No  5. Active Suicidal Ideation with Specific Plan and Intent (Since Last Contact): No  Suicidal Behavior (Since Last Contact)  Actual Attempt (Since Last Contact): No  Has subject engaged in non-suicidal self-injurious behavior? (Since Last Contact): Yes  Interrupted Attempts (Since Last Contact): No  Aborted or Self-Interrupted Attempt (Since Last Contact): No  Preparatory Acts or Behavior (Since Last Contact): No  Suicide (Since Last Contact): No     C-SSRS Risk (Since Last Contact)  Calculated C-SSRS Risk Score (Since Last Contact): Moderate Risk    Validity of evaluation is not impacted by presenting factors during interview pt presents with chronic suicidal ideation at baseline.   Comments regarding subjective versus objective responses to Henderson tool: see narrative  Environmental or Psychosocial Events: loss of a loved one, challenging interpersonal relationships, impulsivity/recklessness and anniversary of traumatizing events  Chronic Risk Factors: history of psychiatric hospitalization, history of abuse or neglect, history of attachment issues, serious, persistent mental illness, personality disorders and  history of Non-Suicidal Self Injury (NSSI)   Warning Signs: talking or writing about death, dying, or suicide, pain (new or worsening), rage, anger, seeking revenge and recent discharges from emergency department or inpatient psychiatric care  Protective Factors: strong bond to family unit, community support, or employment, lives in a responsibly safe and stable environment and supportive ongoing medical and mental health care relationships  Interpretation of Risk Scoring, Risk Mitigation Interventions and Safety Plan:  Pt is at heightened risk of harm to self due to personality disorder and chronic mental health concerns.  Presentation is consistent with baseline.       Does the patient have thoughts of harming others? No     Is the patient engaging in sexually inappropriate behavior?  no        Current Substance Abuse     Is there recent substance abuse? no     Was a urine drug screen or blood alcohol level obtained: No       Mental Status Exam     Affect: Dramatic and Flat   Appearance: Appropriate    Attention Span/Concentration: Attentive  Eye Contact: Variable   Fund of Knowledge: Delayed    Language /Speech Content: Fluent   Language /Speech Volume: Loud and Normal    Language /Speech Rate/Productions: Pressured and Slow    Recent Memory: Variable   Remote Memory: Variable   Mood: Angry, Anxious, Depressed and Irritable    Orientation to Person: Yes    Orientation to Place: Yes   Orientation to Time of Day: Yes    Orientation to Date: Yes    Situation (Do they understand why they are here?): Yes    Psychomotor Behavior: Agitated    Thought Content: Suicidal   Thought Form: Goal Directed and Obsessive/Perseverative      History of commitment: No      Medication    Psychotropic medications:   No current facility-administered medications for this encounter.     Current Outpatient Medications   Medication     acetaminophen (TYLENOL) 325 MG tablet     alum & mag hydroxide-simethicone (MAALOX  ES) 400-400-40 MG/5ML  SUSP suspension     ARIPiprazole lauroxil ER (ARISTADA) 882 MG/3.2ML intra-muscular     benzonatate (TESSALON) 200 MG capsule     benztropine (COGENTIN) 1 MG tablet     bisacodyl (DULCOLAX) 5 MG EC tablet     calcium carbonate (TUMS) 500 MG chewable tablet     clobetasol (TEMOVATE) 0.05 % CREA cream     cyclobenzaprine (FLEXERIL) 10 MG tablet     diclofenac (VOLTAREN) 1 % topical gel     docusate sodium (COLACE) 50 MG capsule     fluticasone (FLONASE) 50 MCG/ACT nasal spray     guaiFENesin (MUCINEX) 600 MG 12 hr tablet     hydrocortisone (CORTAID) 0.5 % external cream     hydrOXYzine (ATARAX) 50 MG tablet     hyoscyamine (LEVSIN/SL) 0.125 MG sublingual tablet     ibuprofen (ADVIL/MOTRIN) 400 MG tablet     loperamide (IMODIUM) 2 MG capsule     loratadine (CLARITIN) 10 MG tablet     LORazepam (ATIVAN) 0.5 MG tablet     multivitamin, therapeutic (THERA-VIT) TABS tablet     OLANZapine zydis (ZYPREXA) 5 MG ODT     omeprazole (PRILOSEC) 40 MG DR capsule     ondansetron (ZOFRAN) 4 MG tablet     polyethylene glycol (MIRALAX) 17 GM/Dose powder     prochlorperazine (COMPAZINE) 10 MG tablet     promethazine (PHENERGAN) 12.5 MG tablet     sennosides (SENOKOT) 8.6 MG tablet     traZODone (DESYREL) 50 MG tablet     venlafaxine (EFFEXOR-ER) 225 MG 24 hr tablet     Vitamin D, Cholecalciferol, 25 MCG (1000 UT) TABS       Medication changes made in the last two weeks: no       Current Care Team    Primary Care Provider: Jess Wilkins MD - [facility]  Psychiatrist: Emma Jacobson NP - [facility]  Therapist: Rd Barton MA, [facility], and is starting trauma therapy with Lynne will.  : Yes  Other: Group home      Diagnosis    1          Borderline personality disorder           F60.3   - Primary, By History                            2          Unspecified intellectual disability (intellectual developmental disorder) F79 - By History                                        3          Bipolar I disorder, Current or most  recent episode depressed, Unspecified F31.9   - By History     Clinical Summary and Substantiation of Recommendations    Patient's presentation is consistent with baseline behaviors, specifically chronic suicidal ideation, stronger desire to engage in self-injurious behavior.  Patient has significant outpatient supports including psychiatry, therapy, 24/7 group home services, and case management.  Recommendation is for patient to return to current living arrangement at group home, and continue to engage in outpatient services.  This plan is consistent with plan of care outlined in pt's chart.      Recommended disposition: Individual Therapy, Medication Management and Group Home: continues group home placement       Reviewed case and recommendations with attending provider. Attending Name: Dr Sena       Attending concurs with disposition: Yes       Patient and/or validated legal guardian concurs with disposition: No: pt wants to be admitted.         Final disposition: Individual therapy , Medication management and Group home:  Current group home .       Outpatient Details (if applicable):   Aftercare plan and appointments placed in the AVS and provided to patient: Yes. Given to patient by ED RN    Was lethal means counseling provided as a part of aftercare planning? Yes - describe gh secures sharps and medications.;       Assessment Details    Patient interview started at: 542 and completed at: 610.     Total duration spent on the patient case in minutes: 1.25 hrs      CPT code(s) utilized: 17903 - Psychotherapy for Crisis - 60 (30-74*) min       Michelle Lewis, YOSHI, MSW, YOSHI, Psychotherapist  [facility] - Triage & Transition Services  Callback: [phone]

## 2023-03-10 NOTE — ED NOTES
"Pt states she feels suicidal with plans to bite herself to death but than states, \"I am not really wanting to die because I have a new nephew.\"   "

## 2023-03-10 NOTE — ED NOTES
After talking with  pt became increasingly upset about returning back to group home. Pt began biting arm and crying loudly in hallway. Pt given IM medication willingly and is now talking calmly with 1:1 in hallway.

## 2023-03-10 NOTE — DISCHARGE INSTRUCTIONS
Aftercare Plan    Follow up with established providers and supports as scheduled. Continue taking medications as prescribed. Abstain from drugs and alcohol. Utilize your [facility] mental health crisis team as needed. They are available 24/7. Contact information is listed below.     If I am feeling unsafe or I am in a crisis, I will:   Contact my established care providers   Call the [place] Suicide Prevention Lifeline: [phone]   Go to the nearest emergency room   Call 911     Warning signs that I or other people might notice when a crisis is developing for me: changes to sleep, appetite or mood, increased anger, agitation or irritability, feeling depressed or hopeless, spending more time alone or talking less, increased crying, decreased productivity, seeing or hearing things that aren't there, thoughts of not wanting to live anymore or of actually killing myself, thoughts of hurting others    Things I am able to do on my own to cope or help me feel better: watching a favorite tv show or movie, listening to music I enjoy, going outside and breathing fresh air, going for a walk or exercising, taking a shower or bath, a cold or hot beverage, a healthy snack, drawing/coloring/painting, journaling, singing or dancing, deep breathing     I can try practicing square breathing when I begin to feel anxious - inhale through the nose for the count of 4 and the first line on the square. Exhale through the mouth for the count of 4 for the second line of the square. Repeat to complete the square. Repeat the square as many times as needed.    I can also use my five senses to practice mindfulness and grounding. What are five things I can see, four things I can hear, three things I can feel, two things I can smell, and one thing I can taste.     Things that I am able to do with others to cope or help me feel better: sometimes just talking or spending time with someone else, sharing a meal or having coffee, watching a movie or  "playing a game, going for a walk or exercising    I can also use community resources including mental health hotlines, [facility] crisis teams, or apps.     Things I can use or do for distraction: movies/tv, music, reading, games, drawing/coloring/painting or other art, essential oils, exercise, cleaning/organizing, puzzles, crossword puzzles, word search, Sudoku       I can also download a meditation or relaxation kervin, like Calm, Headspace, or Insight Timer (all three offer a free version)    Changes I can make to support my mental health and wellness: Attend scheduled mental health therapy and psychiatric appointments. Take my medications as prescribed. Maintain a daily schedule/routine. Abstain from all mood altering substances, including drugs, alcohol, or medications not currently prescribed to me. Implement a self-care routine.      People in my life that I can ask for help: friends or family, trusted teachers/staff/colleagues, trusted members of my community or place of Taoism, mental health crisis lines, or 911    Your [facility] has a mental health crisis team you can call 24/7: [facility] Adult, [phone]    Other things that are important when I m in crisis: to remember that the feelings I am having right now are temporary, and it won't feel like this forever, and that it is okay and important to ask for help    Crisis Lines  Crisis Text Line  Text 269453  You will be connected with a trained live crisis counselor to provide support.    Por espanol, texto  SIRENA a 606030 o texto a 442-AYUDAME en WhatsApp    [place] Hope Line  1.800.SUICIDE [2504957]      Community Resources  Fast Tracker  Linking people to mental health and substance use disorder resources  fasttrackermn.org     [place] Mental Health Warm Line  Peer to peer support  Monday thru Saturday, 12 pm to 10 pm  137.183.7005 or 7.601.848.2331  Text \"Support\" to 86892    National Brilliant on Mental Illness (MAGY)  043.977.4654 or " 1.888.MAGY.HELPS      Mental Health Apps  My3  https://myProfilepasserpp.org/    VirtualHopeBox  https://Little B[employer]ed Dress/apps/virtual-hope-box/      Additional Information  Today you were seen by a licensed mental health professional through Triage and Transition services, [facility] (P)  for a crisis assessment in the Emergency Department at [facility].  It is recommended that you follow up with your established providers (psychiatrist, mental health therapist, and/or primary care doctor - as relevant) as soon as possible. Coordinators from [facility] will be calling you in the next 24-48 hours to ensure that you have the resources you need.  You can also contact [facility] coordinators directly at [phone]. You may have been scheduled for or offered an appointment with a mental health provider. [facility] maintains an extensive network of licensed behavioral health providers to connect patients with the services they need.  We do not charge providers a fee to participate in our referral network.  We match patients with providers based on a patient's specific needs, insurance coverage, and location.  Our first effort will be to refer you to a provider within your care system, and will utilize providers outside your care system as needed.

## 2023-03-10 NOTE — ED TRIAGE NOTES
Patient reports she was at her psychiatrist today and was told to come to the ED to get evaluated.     Triage Assessment     Row Name 03/10/23 7917       Triage Assessment (Adult)    Airway WDL WDL       Respiratory WDL    Respiratory WDL WDL       Skin Circulation/Temperature WDL    Skin Circulation/Temperature WDL WDL       Cardiac WDL    Cardiac WDL WDL

## 2023-03-10 NOTE — ED PROVIDER NOTES
[facility] [place] ([facility])    3/10/23      ED PROVIDER NOTE  Hallway A  History     Chief Complaint   Patient presents with     Suicidal     SI with plan to bite self and bleed to death     HPI  Sadaf Ross is a 22 year old female well known to the Emergency Department with history of borderline personality disorder, unspecified intellectual disability and unspecified bipolar disorder who presents to the Emergency Department reporting suicidal ideation.  Patiently frequently utilizes our services.    Patient was seen by the DEC .  Per , the patient came in today stating that her psychiatrist told her to come in for evaluation.  States that she went to her group home then came here via cab.  Patient reports that she told her psychiatrist she was thinking about overdosing on her medication.  Patient states she is no longer suicidal and wants to go home.  No HI.  She does acknowledge that she cut herself earlier today which is baseline for her.    Past Medical History  Past Medical History:   Diagnosis Date     ADHD (attention deficit hyperactivity disorder)      Bipolar 1 disorder (H)      Borderline personality disorder (H)      Depression      Depressive disorder      Intellectual disability      Obesity      Syncope      Past Surgical History:   Procedure Laterality Date     APPENDECTOMY       APPENDECTOMY       ARIPiprazole lauroxil ER (ARISTADA) 882 MG/3.2ML intra-muscular  benztropine (COGENTIN) 1 MG tablet  guaiFENesin (MUCINEX) 600 MG 12 hr tablet  LORazepam (ATIVAN) 0.5 MG tablet  multivitamin, therapeutic (THERA-VIT) TABS tablet  OLANZapine zydis (ZYPREXA) 5 MG ODT  omeprazole (PRILOSEC) 40 MG DR capsule  traZODone (DESYREL) 50 MG tablet  venlafaxine (EFFEXOR-ER) 225 MG 24 hr tablet  acetaminophen (TYLENOL) 325 MG tablet  alum & mag hydroxide-simethicone (MAALOX  ES) 400-400-40 MG/5ML SUSP suspension  benzonatate (TESSALON) 200 MG capsule  bisacodyl (DULCOLAX)  "5 MG EC tablet  calcium carbonate (TUMS) 500 MG chewable tablet  clobetasol (TEMOVATE) 0.05 % CREA cream  cyclobenzaprine (FLEXERIL) 10 MG tablet  diclofenac (VOLTAREN) 1 % topical gel  docusate sodium (COLACE) 50 MG capsule  fluticasone (FLONASE) 50 MCG/ACT nasal spray  hydrocortisone (CORTAID) 0.5 % external cream  hydrOXYzine (ATARAX) 50 MG tablet  hyoscyamine (LEVSIN/SL) 0.125 MG sublingual tablet  ibuprofen (ADVIL/MOTRIN) 400 MG tablet  loperamide (IMODIUM) 2 MG capsule  loratadine (CLARITIN) 10 MG tablet  ondansetron (ZOFRAN) 4 MG tablet  polyethylene glycol (MIRALAX) 17 GM/Dose powder  prochlorperazine (COMPAZINE) 10 MG tablet  promethazine (PHENERGAN) 12.5 MG tablet  sennosides (SENOKOT) 8.6 MG tablet  Vitamin D, Cholecalciferol, 25 MCG (1000 UT) TABS      Allergies   Allergen Reactions     Penicillins Rash and Unknown     Family History  Family History   Problem Relation Age of Onset     Diabetes Type 1 Father      Cancer Paternal Grandfather      Social History   Social History     Tobacco Use     Smoking status: Every Day     Packs/day: 1.00     Years: 5.00     Pack years: 5.00     Types: Vaping Device, Cigarettes     Smokeless tobacco: Never   Substance Use Topics     Alcohol use: No     Drug use: No      Past medical history, past surgical history, medications, allergies, family history, and social history were reviewed with the patient. No additional pertinent items.      A medically appropriate review of systems was performed with pertinent positives and negatives noted in the HPI, and all other systems negative.    Physical Exam   BP: 117/82  Pulse: 116  Temp: 98.4  F (36.9  C)  Resp: 16  Height: 162.6 cm (5' 4\")  Weight: 110.7 kg (244 lb)  SpO2: 98 %  Physical Exam  Vitals and nursing note reviewed.   HENT:      Head: Normocephalic.   Eyes:      Pupils: Pupils are equal, round, and reactive to light.   Pulmonary:      Effort: Pulmonary effort is normal.   Musculoskeletal:         General: Normal " range of motion.      Cervical back: Normal range of motion.   Neurological:      General: No focal deficit present.      Mental Status: She is alert.   Psychiatric:         Attention and Perception: Attention and perception normal.         Mood and Affect: Mood and affect normal.         Speech: Speech normal.         Behavior: Behavior normal. Behavior is not agitated, aggressive, hyperactive or combative. Behavior is cooperative.         Thought Content: Thought content normal. Thought content is not paranoid or delusional. Thought content does not include homicidal or suicidal ideation.         Cognition and Memory: Cognition and memory normal.         Judgment: Judgment normal.           ED Course, Procedures, & Data      Procedures       ED Course Selections: A consult was attained from the  DEC service. The case was discussed with Bonita Toscano from that service. The consulting service's recommendations were provided at 5:45 pm. 5 minutes spent discussing case, care and disposition.    5 minutes spent reviewing prior records, including yesterday's visit to the ED.     Mental Health Risk Assessment      PSS-3    Date and Time Over the past 2 weeks have you felt down, depressed, or hopeless? Over the past 2 weeks have you had thoughts of killing yourself? Have you ever attempted to kill yourself? When did this last happen? User   03/10/23 1605 yes yes yes -- HODA      C-SSRS ([place])    Date and Time Q1 Wished to be Dead (Past Month) Q2 Suicidal Thoughts (Past Month) Q3 Suicidal Thought Method Q4 Suicidal Intent without Specific Plan Q5 Suicide Intent with Specific Plan Q6 Suicide Behavior (Lifetime) Within the Past 3 Months? RETIRED: Level of Risk per Screen Screening Not Complete User   03/10/23 1622 yes yes yes no no yes -- -- -- SLG              Suicide assessment completed by mental health (D.E.C., LCSW, etc.)       Results for orders placed or performed during the hospital encounter of 03/10/23    Asymptomatic COVID-19 Virus (Coronavirus) by PCR Nasopharyngeal     Status: Normal    Specimen: Nasopharyngeal; Swab   Result Value Ref Range    SARS CoV2 PCR Negative Negative    Narrative    Testing was performed using the Xpert Xpress SARS-CoV-2 Assay on the Cepheid Gene-Xpert Instrument Systems. Additional information about this Emergency Use Authorization (EUA) assay can be found via the Lab Guide. This test should be ordered for the detection of SARS-CoV-2 in individuals who meet SARS-CoV-2 clinical and/or epidemiological criteria as well as from individuals without symptoms or other reasons to suspect COVID-19. Test performance for asymptomatic patients has only been established in anterior nasal swab specimens. This test is for in vitro diagnostic use under the FDA EUA for laboratories certified under CLIA to perform high complexity testing. This test has not been FDA cleared or approved. A negative result does not rule out the presence of PCR inhibitors in the specimen or target RNA concentration below the limit of detection for the assay. The possibility of a false negative should be considered if the patient's recent exposure or clinical presentation suggests COVID-19. This test was validated by the [facility]. This laboratory is certified under the Clinical Laboratory Improvement Amendments (CLIA) as qualified to perform high complexity laboratory testing.     Drug abuse screen 1 urine (ED)     Status: Normal   Result Value Ref Range    Amphetamines Urine Screen Negative Screen Negative    Barbituates Urine Screen Negative Screen Negative    Benzodiazepine Urine Screen Negative Screen Negative    Cannabinoids Urine Screen Negative Screen Negative    Cocaine Urine Screen Negative Screen Negative    Opiates Urine Screen Negative Screen Negative   Urine Drugs of Abuse Screen     Status: Normal    Narrative    The following orders were created for panel order Urine  Drugs of Abuse Screen.  Procedure                               Abnormality         Status                     ---------                               -----------         ------                     Drug abuse screen 1 urin...[[id number]]  Normal              Final result                 Please view results for these tests on the individual orders.     Medications - No data to display  Labs Ordered and Resulted from Time of ED Arrival to Time of ED Departure   COVID-19 VIRUS (CORONAVIRUS) BY PCR - Normal       Result Value    SARS CoV2 PCR Negative     DRUG ABUSE SCREEN 1 URINE (ED) - Normal    Amphetamines Urine Screen Negative      Barbituates Urine Screen Negative      Benzodiazepine Urine Screen Negative      Cannabinoids Urine Screen Negative      Cocaine Urine Screen Negative      Opiates Urine Screen Negative       No orders to display          Critical care was not performed.     Medical Decision Making  The patient's presentation was of moderate complexity (2 or more stable chronic illnesses).    The patient's evaluation involved:  an assessment requiring an independent historian (see separate area of note for details)  review of external note(s) from 2 sources (see separate area of note for details)    The patient's management necessitated moderate risk (limitations due to social determinants of health (see separate area of note for details)).      Assessment & Plan    Patient is here reporting that her psychiatrist suggested she come to the ED for a psych assessment. Patient was seen here yesterday due to feeling suicidal. She was determined to be at baseline and discharged home. Patient today was feeling much improved and was not feeling suicidal. She found it ironic that she was told to come to the ED. Here she denies any SI and would like to return to her group home.    Patient appears at baseline. She can be discharged back to her group home. Her preference is via ambulance. This will be done due to her  vulnerable adult status.    Patient is encouraged to follow-up established care and services.    I have reviewed the nursing notes. I have reviewed the findings, diagnosis, plan and need for follow up with the patient.    New Prescriptions    No medications on file       Final diagnoses:   Borderline personality disorder (H)   Intellectual disability     I, Eulalia Lowe, am serving as a trained medical scribe to document services personally performed by Magno Sena MD, based on the provider's statements to me.   Magno MCGINNIS MD, was physically present and have reviewed and verified the accuracy of this note documented by Eulalia Lowe.    Magno Sena MD  [facility] EMERGENCY DEPARTMENT  3/10/2023     Magno Sena MD  03/10/23 3957

## 2023-03-10 NOTE — CONSULTS
Diagnostic Evaluation Consultation  Crisis Assessment    Patient was assessed: In Person  Patient location: [facility] ED  Was a release of information signed: No. Reason: no DANDY needed      Referral Data and Chief Complaint  Sadaf is a 22 year old, who uses she/her pronouns, and presents to the ED alone. Patient is referred to the ED by self. Patient is presenting to the ED for the following concerns: suicidal ideation.      Informed Consent and Assessment Methods     Patient is her own guardian. Writer met with patient and explained the crisis assessment process, including applicable information disclosures and limits to confidentiality, assessed understanding of the process, and obtained consent to proceed with the assessment. Patient was observed to be able to participate in the assessment as evidenced by verbal understanding and engagement in the assessment process. Assessment methods included conducting a formal interview with patient, review of medical records, collaboration with medical staff, and obtaining relevant collateral information from family and community providers when available.    Over the course of this crisis assessment this writer provided reassurance and offered validation. Patient's response to interventions was positive. Pt answered this writer's questions about her mental health symptoms and engaged in disposition planning.     Summary of Patient Situation     Pt presents to the ED alone. She reports that she was at a psychiatry appointment this morning and that her psychiatrist told her to come to the ED to be evaluated. She returned to her group home after her appointment and then came to the ED. Pt states that her psychiatrist wanted her to be evaluated as she had endorsed suicidal ideation to them with a plan to overdose on her medication. She currently denies any suicidal ideation at present. When asked about NSSI, she states that she bit at her arm some today. She has some  superficial marks on her arm from the NSSI. This is consistent with the pt's baseline. Pt denies homicidal ideation. Pt reports that she is consistently taking her psychiatric medication.     Brief Psychosocial History     Pt is her own legal guardian. She resides in a group home with 24/7 staffing and supervision. Pt's father passed away approximately two years ago and she is continuing to grieve his death. Pt's support system includes her mother and siblings.     Significant Clinical History     Pt has a history of frequent ED visits with her last visit taking place yesterday on 03/09/23. She often presents at the ED with concerns of NSSI, SI, and auditory hallucinations. Pt engages in NSSI and experiences SI at baseline. Pt is followed by a psychiatrist, therapist, and . She is currently prescribed hydroxyzine 50 mg, Ativan 0.5 mg, Zyprexa 5 mg, trazodone 50 mg, and Effexor extended release 225 mg. Pt has a history of inpatient hospitalizations with her last hospitalization taking place in November 2021 at [facility].     Pt has an ED care plan in place as described below:    The pt has become increasingly disruptive in the Emergency Department.  The pt will yell, demand, and scream and disrupt the milieu, and this happens before she finds out the recommendation to send her back to her group home.  Today, the pt postured toward the ED doctor and threatened to  punch him.   During one of her ED visits over the past week, the pt befriended another patient who is waiting for an inpatient bed.  The pt has been texting and contacting this patient.  There is concern that one of the reasons the pt is coming to the ED is to interact with this patient.        An Emergency Department Care Plan is being sought in order to reduce and extinguish the pt s numerous ED visits.  The pt does have an active outpatient team that the pt can utilize for support and lives in a group home with 24/7 staffing.  Typically, the pt  calls 911, not group home staff.  Additionally, [facility] have attempted to recommend additionally services, such as day treatment, and the pt refuses the recommendations.  It appears that the pt comes to the ED, requesting admission as she does not like her group home.        To that end, it is recommended that if the pt returns to the Emergency Department, she be triaged and if there is not any acute new symptoms, both medically and mental health-wise, the group home be called and informed that the pt is returning and medical transport be set up for her return.  If the pt becomes dysregulated, the pt should be offered/given appropriate medications.  This should not hinder her return to her group home.        9/27/22, Nuria Deleon [facility]; Dr. Luis De Anda; Meche Butts, [facility]     Collateral Information    This writer attempted to call pt's group home at [phone] and left a voicemail message requesting a call-back.      Risk Assessment    [place] Suicide Severity Rating Scale Since Last Contact: 03/10/2023  Suicidal Ideation (Since Last Contact)  1. Wish to be Dead (Since Last Contact): Yes  2. Non-Specific Active Suicidal Thoughts (Since Last Contact): Yes  3. Active Suicidal Ideation with any Methods (Not Plan) Without Intent to Act (Since Last Contact): Yes  4. Active Suicidal Ideation with Some Intent to Act, Without Specific Plan (Since Last Contact): No  5. Active Suicidal Ideation with Specific Plan and Intent (Since Last Contact): No  Suicidal Behavior (Since Last Contact)  Actual Attempt (Since Last Contact): No  Has subject engaged in non-suicidal self-injurious behavior? (Since Last Contact): Yes  Interrupted Attempts (Since Last Contact): No  Aborted or Self-Interrupted Attempt (Since Last Contact): No  Preparatory Acts or Behavior (Since Last Contact): No  Suicide (Since Last Contact): No     C-SSRS Risk (Since Last Contact)  Calculated C-SSRS Risk Score (Since Last Contact): Moderate  Risk    Validity of evaluation is not impacted by presenting factors during interview.   Comments regarding subjective versus objective responses to [place] tool: none  Environmental or Psychosocial Events: loss of a loved one and neither working nor attending school  Chronic Risk Factors: history of psychiatric hospitalization, history of abuse or neglect, history of attachment issues, serious, persistent mental illness, personality disorders and history of Non-Suicidal Self Injury (NSSI)   Warning Signs: talking or writing about death, dying, or suicide, rage, anger, seeking revenge and recent discharges from emergency department or inpatient psychiatric care  Protective Factors: strong bond to family unit, community support, or employment, lives in a responsibly safe and stable environment, supportive ongoing medical and mental health care relationships and help seeking  Interpretation of Risk Scoring, Risk Mitigation Interventions and Safety Plan:  Pt told her psychiatrist earlier today that she was thinking about overdosing on her medications but denies any suicidal ideation at present. Pt chronically experiences suicidal ideation but consistently demonstrates help-seeking behavior by frequently presenting at the ED.     Does the patient have thoughts of harming others? No     Is the patient engaging in sexually inappropriate behavior?  no        Current Substance Abuse     Is there recent substance abuse? no     Was a urine drug screen or blood alcohol level obtained: No       Mental Status Exam     Affect: Appropriate   Appearance: Appropriate    Attention Span/Concentration: Attentive  Eye Contact: Engaged   Fund of Knowledge: Appropriate    Language /Speech Content: Fluent   Language /Speech Volume: Normal    Language /Speech Rate/Productions: Normal    Recent Memory: Intact   Remote Memory: Intact   Mood: Anxious    Orientation to Person: Yes    Orientation to Place: Yes   Orientation to Time of Day: Yes     Orientation to Date: Yes    Situation (Do they understand why they are here?): Yes    Psychomotor Behavior: Normal    Thought Content: Clear   Thought Form: Intact      History of commitment: No       Medication    Psychotropic medications: Yes. Pt is currently taking hydroxyzine 50 mg, Ativan 0.5 mg, Zyprexa 5 mg, trazodone 50 mg, and Effexor extended release 225 mg. Medication compliant: Yes. Recent medication changes: No  Medication changes made in the last two weeks: No       Current Care Team    Primary Care Provider: Dr. Jess Wilkins MD, [facility]  Psychiatrist: Emma Jacobson, [facility]  Therapist: Rd Barton MA, [facility], [facility]. Pt is also planning on starting trauma therapy soon.  : Yes     CTSS or ARMHS: No  ACT Team: No  Other: No      Diagnosis    301.83 (F60.3) Borderline Personality Disorder - by history  Intellectual Disabilites  319 (F79) Unspecified Intellectual Disability (Intellectual Developmental Disorder) - by history  Other Unspecified and Specified Bipolar and Related Disorder 296.80 (F31.9) Unspecified Bipolar and Related Disorder - by history    Clinical Summary and Substantiation of Recommendations    After therapeutic assessment, intervention and aftercare planning by ED care team and [facility] and in consultation with attending provider, the patient's circumstances and mental state were safe for outpatient management. Pt denies suicidal or homicidal ideation and reports a decrease in level of mental health symptoms since her psychiatry appointment this morning.The patient was discharged. Close follow-up with a psychiatrist and/or therapist was recommended and community psychiatric resources were provided. Patient is to return to the ED if any urgent or potentially life-threatening concerns arise.       At the time of discharge, the patient's acute suicide risk was determined to be low due to the following factors: reduction in the intensity of mood/anxiety  symptoms that preceded the admission, denial of suicidal thoughts, denies feeling helpless or hopeless, not currently under the influence of alcohol or illicit substances and denies experiencing command hallucinations. Protective factors include: voluntarily seeking mental health support, established relationship community mental health provider(s), expresses desire to engage in treatment and safe/stable housing   Disposition    Recommended disposition: Individual Therapy, Medication Management and Group Home: return to group home       Reviewed case and recommendations with attending provider. Attending Name: Dr. Sena       Attending concurs with disposition: Yes       Patient and/or validated legal guardian concurs with disposition: Yes       Final disposition: Individual therapy , Medication management and Group home: return to group home .     Outpatient Details (if applicable):   Aftercare plan and appointments placed in the AVS and provided to patient: Yes. Given to patient by RN    Was lethal means counseling provided as a part of aftercare planning? No, pt denies suicidal ideation at present. Pt lives in a group home setting where there is ongoing monitoring of pt's access to medication.      Assessment Details    Patient interview started at: 5:45 PM and completed at: 5:55 PM.     Total duration spent on the patient case in minutes: .75 hrs      CPT code(s) utilized: 36082 - Psychotherapy for Crisis - 60 (30-74*) min       CHERI Mendosa, Psychotherapist  [facility] - Triage & Transition Services  Callback: [phone]      Aftercare Plan    Follow up with established providers and supports as scheduled. Continue taking medications as prescribed. Abstain from drugs and alcohol. Utilize your [facility] crisis team as needed. They are available 24/7. Contact information is listed below.     If I am feeling unsafe or I am in a crisis, I will:   Contact my established care providers   Call the  National Suicide Prevention Lifeline: 162.808.9086   Go to the nearest emergency room   Call 911     Warning signs that I or other people might notice when a crisis is developing for me: changes to sleep, appetite or mood, increased anger, agitation or irritability, feeling depressed or hopeless, spending more time alone or talking less, increased crying, decreased productivity, seeing or hearing things that aren't there, thoughts of not wanting to live anymore or of actually killing myself, thoughts of hurting others    Things I am able to do on my own to cope or help me feel better: watching a favorite tv show or movie, listening to music I enjoy, going outside and breathing fresh air, going for a walk or exercising, taking a shower or bath, a cold or hot beverage, a healthy snack, drawing/coloring/painting, journaling, singing or dancing, deep breathing     I can try practicing square breathing when I begin to feel anxious - inhale through the nose for the count of 4 and the first line on the square. Exhale through the mouth for the count of 4 for the second line of the square. Repeat to complete the square. Repeat the square as many times as needed.    I can also use my five senses to practice mindfulness and grounding. What are five things I can see, four things I can hear, three things I can feel, two things I can smell, and one thing I can taste.     Things that I am able to do with others to cope or help me feel better: sometimes just talking or spending time with someone else, sharing a meal or having coffee, watching a movie or playing a game, going for a walk or exercising    I can also use community resources including mental health hotlines, [facility] crisis teams, or apps.     Things I can use or do for distraction: movies/tv, music, reading, games, drawing/coloring/painting or other art, essential oils, exercise, cleaning/organizing, puzzles, crossword puzzles, word search, Sudoku       I can also  "download a meditation or relaxation kervin, like Calm, Headspace, or Insight Timer (all three offer a free version)    Changes I can make to support my mental health and wellness: Attend scheduled mental health therapy and psychiatric appointments. Take my medications as prescribed. Maintain a daily schedule/routine. Abstain from all mood altering substances, including drugs, alcohol, or medications not currently prescribed to me. Implement a self-care routine.      People in my life that I can ask for help: friends or family, trusted teachers/staff/colleagues, trusted members of my community or place of [place], mental health crisis lines, or 911    Your [facility] has a mental health crisis team you can call 24/7: [facility], [phone]    Other things that are important when I m in crisis: to remember that the feelings I am having right now are temporary, and it won't feel like this forever, and that it is okay and important to ask for help    Crisis Lines  Crisis Text Line  Text 085527  You will be connected with a trained live crisis counselor to provide support.    Por espanol, texto  SIRENA a 621578 o texto a 442-AYUDAME en WhatsApp    National Hope Line  1.800.SUICIDE [7673627]      Community Resources  Fast Tracker  Linking people to mental health and substance use disorder resources  fasttrackCoinJarn.org     [place] Mental Health Warm Line  Peer to peer support  Monday thru Saturday, 12 pm to 10 pm  790.594.8806 or 3.683.557.5525  Text \"Support\" to 84981    National [place] on Mental Illness (MAGY)  379.687.8394 or 1.888.MAGY.HELPS      Mental Health Apps  My3  https://my3app.org/    VirtualHopeBox  https://Brandsclub.org/apps/virtual-hope-box/      Additional Information  Today you were seen by a licensed mental health professional through Triage and Transition services, [facility] ([facility])  for a crisis assessment in the Emergency Department at [facility].  It " is recommended that you follow up with your established providers (psychiatrist, mental health therapist, and/or primary care doctor - as relevant) as soon as possible. Coordinators from [facility] will be calling you in the next 24-48 hours to ensure that you have the resources you need.  You can also contact [facility] coordinators directly at [phone]. You may have been scheduled for or offered an appointment with a mental health provider. [facility] maintains an extensive network of licensed behavioral health providers to connect patients with the services they need.  We do not charge providers a fee to participate in our referral network.  We match patients with providers based on a patient's specific needs, insurance coverage, and location.  Our first effort will be to refer you to a provider within your care system, and will utilize providers outside your care system as needed.

## 2023-03-10 NOTE — DISCHARGE INSTRUCTIONS
"Aftercare Plan  If I am feeling unsafe or I am in a crisis, I will:   Contact my established care providers   Call the National Suicide Prevention Lifeline: 988  Go to the nearest emergency room   Call 911      Things I am able to do on my own or with others to cope or help me feel better: listen to music, practice deep breathing, talk to peers, family or group home staff, practice yoga or meditation, go for a walk with a loved one     Changes I can make to support my mental health and wellness: adhere to treatment recommendations, take medication as prescribed, talk to group home staff, continue following up with current outpatient providers     People in my life that I can ask for help: mom, group home staff, outpatient supports     Your [facility] has a mental health crisis team you can call 24/7: [facility] Mobile Crisis  [phone]      Crisis Lines  Crisis Text Line  Text 263799  You will be connected with a trained live crisis counselor to provide support.     Por espanol, texto  SIRENA a 678060 o texto a 442-AYUDAME en WhatsApp     The [employer] Project (LGBTQ Youth Crisis Line)  1.763.948.0343  text START to 857-614        Community Resources  Fast Tracker  Linking people to mental health and substance use disorder resources  fastWatchsendckNext Generation Dancen.org      [place] Mental Health Warm Line  Peer to peer support  Monday thru Saturday, 12 pm to 10 pm  643.918.2951 or 4.636.541.2999  Text \"Support\" to 02321     National [place] on Mental Illness (MAGY)  560.017.9047 or 1.888.MAGY.HELPS        Mental Health Apps  My3  https://my3app.org/     VirtualHopeBox  https://Voter Gravityde.org/apps/virtual-hope-box/        Additional Information  Today you were seen by a licensed mental health professional through Triage and Transition services, [facility] ([facility])  for a crisis assessment in the Emergency Department at [facility].  It is recommended that you follow up with your established " providers (psychiatrist, mental health therapist, and/or primary care doctor - as relevant) as soon as possible. Coordinators from [facility] will be calling you in the next 24-48 hours to ensure that you have the resources you need.  You can also contact [facility] coordinators directly at [phone]. You may have been scheduled for or offered an appointment with a mental health provider. [facility] maintains an extensive network of licensed behavioral health providers to connect patients with the services they need.  We do not charge providers a fee to participate in our referral network.  We match patients with providers based on a patient's specific needs, insurance coverage, and location.  Our first effort will be to refer you to a provider within your care system, and will utilize providers outside your care system as needed.

## 2023-03-12 ENCOUNTER — NURSE TRIAGE (OUTPATIENT)
Dept: NURSING | Facility: CLINIC | Age: 23
End: 2023-03-12
Payer: MEDICARE

## 2023-03-12 NOTE — TELEPHONE ENCOUNTER
Error.    Autumn Alcaraz RN, MA  [facility] Triage Nurse Advisor

## 2023-03-12 NOTE — TELEPHONE ENCOUNTER
"Addendum    03/12/2023    5:39 pm    Sadaf is a frequent caller to the nurse triage line.  Calling back again about one hour after hung up on below nurse, see her notes.  Continues to C/O vomiting and \"SI\" (suicidal ideation).  Her plan is to hit her head on the wall.  States she feels this way all the time. Patient hung up and writer when trying to tell patient what she can do to lessen her nausea and vomiting. Writer called group home to let them know about her nausea and vomiting and suicidal thoughts.    Per [facility] group home staff,  Sadaf eats a lot then sticks her finger down her throat to make herself vomit.  Was offered an antiemetic and she refused.  [facility] says group home staff are watching her.    Autumn Alcaraz RN, [facility] Triage Nurse Advisor  ________________________________________________________________________        Caller frequent nurse line caller; reports  today she  has been vomiting since this morning   states she just ate some chicken and vomited  again   Caller states she has \"suicidal ideation\" today and  answers in the affirmative when asked if she has suicidal ideation everyday.   Caller states she wants to go to the emergency room   Triage protocol reviewed   Caller denies diarrhea or fever   Caller has been eating regular diet despite vomiting   Caller states group home staff aware of hr  Concerns   Caller is advised to discuss her  suicidal ideation with staff member present at  group home   Caller is advised that  vomiting can be treated at home and does not require  ED visit   Began  giving home care instuctions and patient hung up   Contacted nurse Arlene for group home; she states she has been made aware of  Sadaf's concerns today; states she wants to go to the ED almost everyday   States she will contact group home staff now to assess current  concerns   Heather Jane RN  FNA      Reason for Disposition    MILD or MODERATE vomiting (e.g., 1 - 5 times / " "day)    Additional Information    Negative: Shock suspected (e.g., cold/pale/clammy skin, too weak to stand, low BP, rapid pulse)    Negative: Difficult to awaken or acting confused (e.g., disoriented, slurred speech)    Negative: Sounds like a life-threatening emergency to the triager    Negative: Vomiting occurs only while coughing    Negative: [1] Pregnant < 20 Weeks AND [2] nausea/vomiting began in early pregnancy (i.e., 4-8 weeks pregnant)    Negative: Chest pain    Negative: Headache is main symptom    Negative: Vomiting (or Nausea) in a cancer patient who is currently (or recently) receiving chemotherapy or radiation therapy, or cancer patient who has metastatic or end-stage cancer and is receiving palliative care    Negative: [1] Vomiting AND [2] contains red blood or black (\"coffee ground\") material  (Exception: few red streaks in vomit that only happened once)    Negative: Severe pain in one eye    Negative: Recent head injury (within last 3 days)    Negative: Recent abdominal injury (within last 3 days)    Negative: [1] Insulin-dependent diabetes (Type I) AND [2] glucose > 400 mg/dl (22 mmol/l)    Negative: [1] Vomiting AND [2] hernia is more painful or swollen than usual    Negative: [1] SEVERE vomiting (e.g., 6 or more times/day) AND [2] present > 8 hours (Exception: patient sounds well, is drinking liquids, does not sound dehydrated, and vomiting has lasted less than 24 hours)    Negative: [1] MODERATE vomiting (e.g., 3 - 5 times/day) AND [2] age > 60 years    Negative: Severe headache (e.g., excruciating) (Exception: similar to previous migraines)    Negative: High-risk adult (e.g., diabetes mellitus, brain tumor, V-P shunt, hernia)    Negative: [1] Drinking very little AND [2] dehydration suspected (e.g., no urine > 12 hours, very dry mouth, very lightheaded)    Negative: Patient sounds very sick or weak to the triager    Negative: [1] Vomiting AND [2] abdomen looks much more swollen than usual    " Negative: [1] Vomiting AND [2] contains bile (green color)    Negative: [1] Constant abdominal pain AND [2] present > 2 hours    Negative: [1] Fever > 103 F (39.4 C) AND [2] not able to get the fever down using Fever Care Advice    Negative: [1] Fever > 101 F (38.3 C) AND [2] age > 60 years    Negative: [1] Fever > 100.0 F (37.8 C) AND [2] bedridden (e.g., nursing home patient, CVA, chronic illness, recovering from surgery)    Negative: [1] Fever > 100.0 F (37.8 C) AND [2] weak immune system (e.g., HIV positive, cancer chemo, splenectomy, organ transplant, chronic steroids)    Negative: Taking any of the following medications: digoxin (Lanoxin), lithium, theophylline, phenytoin (Dilantin)    Negative: [1] MILD or MODERATE vomiting AND [2] present > 48 hours (2 days) (Exception: mild vomiting with associated diarrhea)    Negative: Fever present > 3 days (72 hours)    Negative: Vomiting a prescription medication    Negative: [1] MILD vomiting with diarrhea AND [2] present > 5 days    Negative: Substance use (drug use) or unhealthy alcohol use, known or suspected    Negative: Vomiting is a chronic symptom (recurrent or ongoing AND present > 4 weeks)    Negative: [1] SEVERE vomiting (e.g., 6 or more times/day, vomits everything) BUT [2] hydrated    Protocols used: VOMITING-A-

## 2023-03-13 ENCOUNTER — HOSPITAL ENCOUNTER (EMERGENCY)
Facility: CLINIC | Age: 23
Discharge: NURSING, REHAB OR LONG-TERM CARE | End: 2023-03-14
Attending: EMERGENCY MEDICINE
Payer: MEDICARE

## 2023-03-13 ENCOUNTER — HOSPITAL ENCOUNTER (EMERGENCY)
Facility: CLINIC | Age: 23
Discharge: NURSING, REHAB OR LONG-TERM CARE | End: 2023-03-13
Attending: FAMILY MEDICINE | Admitting: FAMILY MEDICINE
Payer: MEDICARE

## 2023-03-13 VITALS
SYSTOLIC BLOOD PRESSURE: 112 MMHG | HEART RATE: 86 BPM | RESPIRATION RATE: 16 BRPM | TEMPERATURE: 98.6 F | DIASTOLIC BLOOD PRESSURE: 54 MMHG | OXYGEN SATURATION: 99 %

## 2023-03-13 DIAGNOSIS — F60.3 BORDERLINE PERSONALITY DISORDER (H): ICD-10-CM

## 2023-03-13 DIAGNOSIS — R45.851 SUICIDAL IDEATIONS: ICD-10-CM

## 2023-03-13 DIAGNOSIS — F60.3 BORDERLINE PERSONALITY DISORDER (H): Chronic | ICD-10-CM

## 2023-03-13 DIAGNOSIS — U07.1 INFECTION DUE TO 2019 NOVEL CORONAVIRUS: ICD-10-CM

## 2023-03-13 PROCEDURE — 99283 EMERGENCY DEPT VISIT LOW MDM: CPT | Performed by: EMERGENCY MEDICINE

## 2023-03-13 PROCEDURE — 99207 PR NO BILLABLE SERVICE THIS VISIT: CPT | Mod: CS | Performed by: FAMILY MEDICINE

## 2023-03-13 PROCEDURE — 250N000011 HC RX IP 250 OP 636: Performed by: FAMILY MEDICINE

## 2023-03-13 PROCEDURE — 99283 EMERGENCY DEPT VISIT LOW MDM: CPT | Performed by: FAMILY MEDICINE

## 2023-03-13 RX ORDER — ONDANSETRON 4 MG/1
4 TABLET, ORALLY DISINTEGRATING ORAL ONCE
Status: COMPLETED | OUTPATIENT
Start: 2023-03-13 | End: 2023-03-13

## 2023-03-13 RX ADMIN — ONDANSETRON 4 MG: 4 TABLET, ORALLY DISINTEGRATING ORAL at 13:44

## 2023-03-13 ASSESSMENT — ACTIVITIES OF DAILY LIVING (ADL)
ADLS_ACUITY_SCORE: 37

## 2023-03-13 NOTE — ED TRIAGE NOTES
Triage Assessment     Row Name 03/13/23 1203       Triage Assessment (Adult)    Airway WDL WDL       Respiratory WDL    Respiratory WDL WDL       Skin Circulation/Temperature WDL    Skin Circulation/Temperature WDL WDL       Cardiac WDL    Cardiac WDL WDL       Peripheral/Neurovascular WDL    Peripheral Neurovascular WDL WDL       Cognitive/Neuro/Behavioral WDL    Cognitive/Neuro/Behavioral WDL WDL

## 2023-03-13 NOTE — ED NOTES
Writer called Sadaf's  to inform them of her discharge back to .  Writer spoke with Arlene tel: [phone]    Address: [address] [facility]

## 2023-03-13 NOTE — ED PROVIDER NOTES
ED Provider Note  [facility]      History     Chief Complaint   Patient presents with     Covid Concern     Stated test positive and has chills.     Suicidal     Plan  to smash head     HPI  Sadaf Ross is a 22 year old female who has history of ADHD depressive disorder bipolar disorder intellectual disability presents to the ER now with positive COVID testing this morning along with this feeling suicidal.  Patient states she has been feeling crummy for the last few days has some nausea fevers noted patient cough.  No true shortness of breath otherwise no hemoptysis no leg pain or leg swelling no reports of chest pain patient now feels suicidal also presents here to the ER for evaluation.    Past Medical History  Past Medical History:   Diagnosis Date     ADHD (attention deficit hyperactivity disorder)      Bipolar 1 disorder (H)      Borderline personality disorder (H)      Depression      Depressive disorder      Intellectual disability      Obesity      Syncope      Past Surgical History:   Procedure Laterality Date     APPENDECTOMY       APPENDECTOMY       acetaminophen (TYLENOL) 325 MG tablet  alum & mag hydroxide-simethicone (MAALOX  ES) 400-400-40 MG/5ML SUSP suspension  ARIPiprazole lauroxil ER (ARISTADA) 882 MG/3.2ML intra-muscular  benzonatate (TESSALON) 200 MG capsule  benztropine (COGENTIN) 1 MG tablet  bisacodyl (DULCOLAX) 5 MG EC tablet  calcium carbonate (TUMS) 500 MG chewable tablet  clobetasol (TEMOVATE) 0.05 % CREA cream  cyclobenzaprine (FLEXERIL) 10 MG tablet  diclofenac (VOLTAREN) 1 % topical gel  docusate sodium (COLACE) 50 MG capsule  fluticasone (FLONASE) 50 MCG/ACT nasal spray  guaiFENesin (MUCINEX) 600 MG 12 hr tablet  hydrocortisone (CORTAID) 0.5 % external cream  hydrOXYzine (ATARAX) 50 MG tablet  hyoscyamine (LEVSIN/SL) 0.125 MG sublingual tablet  ibuprofen (ADVIL/MOTRIN) 400 MG tablet  loperamide (IMODIUM) 2 MG capsule  loratadine (CLARITIN) 10 MG  tablet  LORazepam (ATIVAN) 0.5 MG tablet  multivitamin, therapeutic (THERA-VIT) TABS tablet  OLANZapine zydis (ZYPREXA) 5 MG ODT  omeprazole (PRILOSEC) 40 MG DR capsule  ondansetron (ZOFRAN) 4 MG tablet  polyethylene glycol (MIRALAX) 17 GM/Dose powder  prochlorperazine (COMPAZINE) 10 MG tablet  promethazine (PHENERGAN) 12.5 MG tablet  sennosides (SENOKOT) 8.6 MG tablet  traZODone (DESYREL) 50 MG tablet  venlafaxine (EFFEXOR-ER) 225 MG 24 hr tablet  Vitamin D, Cholecalciferol, 25 MCG (1000 UT) TABS      Allergies   Allergen Reactions     Penicillins Rash and Unknown     Family History  Family History   Problem Relation Age of Onset     Diabetes Type 1 Father      Cancer Paternal Grandfather      Social History   Social History     Tobacco Use     Smoking status: Every Day     Packs/day: 1.00     Years: 5.00     Pack years: 5.00     Types: Vaping Device, Cigarettes     Smokeless tobacco: Never   Substance Use Topics     Alcohol use: No     Drug use: No         A medically appropriate review of systems was performed with pertinent positives and negatives noted in the HPI, and all other systems negative.    Physical Exam   BP: 123/72  Pulse: 100  Temp: 98.5  F (36.9  C)  Resp: 16  SpO2: 99 %  Physical Exam  Vitals and nursing note reviewed.   Constitutional:       General: She is in acute distress.      Appearance: She is well-developed. She is not toxic-appearing or diaphoretic.      Comments: Patient laying face down interactive using her phone also vitally stable breath sounds weak without wheezing occasional cough noted.  No abdominal pain.  Neurologically nonfocal at this point admits to having suicidal thoughts   HENT:      Head: Normocephalic and atraumatic.      Mouth/Throat:      Mouth: Mucous membranes are moist.   Eyes:      General: No scleral icterus.     Extraocular Movements: Extraocular movements intact.      Conjunctiva/sclera: Conjunctivae normal.      Pupils: Pupils are equal, round, and reactive to  light.   Cardiovascular:      Rate and Rhythm: Regular rhythm. Tachycardia present.   Pulmonary:      Effort: No respiratory distress.      Breath sounds: No wheezing.   Abdominal:      Tenderness: There is no guarding.   Musculoskeletal:         General: No tenderness.      Cervical back: Normal range of motion and neck supple. No rigidity.   Skin:     General: Skin is warm and dry.      Capillary Refill: Capillary refill takes less than 2 seconds.      Coloration: Skin is not pale.      Findings: No erythema or rash.   Neurological:      General: No focal deficit present.      Mental Status: She is alert and oriented to person, place, and time. Mental status is at baseline.   Psychiatric:      Comments: Admits suicidal ideations           ED Course, Procedures, & Data       I did order Zofran ODT at this point reviewed records as noted today at 930 COVID-positive test noted.    Discussed with mental health  at this point patient has a care plan at this point patient chronically suicidal they do not feel they need to see the patient anymore unless something is different patient has not act upon this otherwise been stable here appears to be a chronic issues.    Patient's group home currently willing to take the patient back we will continue to monitor patient as she is monitored 24/7.  Patient improved after the Zofran otherwise vitally stable transferred back via ambulance to group home.          Procedures              No results found for any visits on 03/13/23.  Medications   ondansetron (ZOFRAN ODT) ODT tab 4 mg (4 mg Oral $Given 3/13/23 1344)     Labs Ordered and Resulted from Time of ED Arrival to Time of ED Departure - No data to display  No orders to display          Critical care was not performed.     Medical Decision Making  The patient's presentation was of moderate complexity (an acute illness with systemic symptoms).    The patient's evaluation involved:  review of external note(s) from 2  sources (see separate area of note for details)  ordering and/or review of 2 test(s) in this encounter (see separate area of note for details)  review of 2 test result(s) ordered prior to this encounter (see separate area of note for details)  discussion of management or test interpretation with another health professional (see separate area of note for details)    The patient's management necessitated moderate risk (limitations due to social determinants of health (see separate area of note for details)).      Assessment & Plan   32-year-old female with intellectual disability borderline personality chronic suicidal ideations bipolar disorder who presented ER after being seen to clinic positive COVID patient some mild nausea but vitally stable otherwise no significant respiratory distress etc.  Patient given Zofran otherwise supportive care recommended patient also states she is suicidal and wanted to smash her head and because of patient's chronic care plan discussing with our mental health  as they felt there is no indication to them to see the patient the group home where she lives at is comfortable managing her lady with 24/7 care and these are chronic issues she is deals with she has not acted upon this patient to discharge back by ambulance to her group home monitoring her COVID symptoms return if any concerns etc.       I have reviewed the nursing notes. I have reviewed the findings, diagnosis, plan and need for follow up with the patient.    Discharge Medication List as of 3/13/2023  4:58 PM          Final diagnoses:   Infection due to 2019 novel coronavirus   Suicidal ideations   Borderline personality disorder (H)       Rakesh Estrella  [facility] EMERGENCY DEPARTMENT  3/13/2023    This note was created at least in part by the use of dragon voice dictation system. Inadvertent typographical errors may still exist.  Rakesh Estrella MD.    Patient evaluated in the emergency department  during the COVID-19 pandemic period. Careful attention to patients safety was addressed throughout the evaluation. Evaluation and treatment management was initiated with disposition made efficiently and appropriate as possible to minimize any risk of potential exposure to patient during this evaluation.       Rakesh Estrella MD  03/13/23 1472

## 2023-03-13 NOTE — DISCHARGE INSTRUCTIONS
Home to your group home.  You have been diagnosed with covid by your clinic today.  Your vitals appear stable.  Recommend tylenol and OTC cough medicines as needed.  Your mental health concerns have been reviewed with our mental health  and your group home are comfortable with you returning continuing your current cares.  Follow up with MD at [facility] for recheck on any covid concerns.

## 2023-03-14 ENCOUNTER — HOSPITAL ENCOUNTER (EMERGENCY)
Facility: CLINIC | Age: 23
Discharge: HOME | End: 2023-03-14
Attending: EMERGENCY MEDICINE | Admitting: EMERGENCY MEDICINE
Payer: MEDICARE

## 2023-03-14 VITALS
DIASTOLIC BLOOD PRESSURE: 79 MMHG | RESPIRATION RATE: 18 BRPM | TEMPERATURE: 98.8 F | SYSTOLIC BLOOD PRESSURE: 130 MMHG | OXYGEN SATURATION: 98 % | HEART RATE: 75 BPM

## 2023-03-14 VITALS
SYSTOLIC BLOOD PRESSURE: 114 MMHG | HEART RATE: 103 BPM | RESPIRATION RATE: 18 BRPM | OXYGEN SATURATION: 97 % | DIASTOLIC BLOOD PRESSURE: 82 MMHG | TEMPERATURE: 98.6 F

## 2023-03-14 DIAGNOSIS — F79 INTELLECTUAL DISABILITY: ICD-10-CM

## 2023-03-14 DIAGNOSIS — F60.3 BORDERLINE PERSONALITY DISORDER (H): ICD-10-CM

## 2023-03-14 PROCEDURE — 99285 EMERGENCY DEPT VISIT HI MDM: CPT | Performed by: EMERGENCY MEDICINE

## 2023-03-14 PROCEDURE — 99284 EMERGENCY DEPT VISIT MOD MDM: CPT | Performed by: EMERGENCY MEDICINE

## 2023-03-14 RX ORDER — OLANZAPINE 10 MG/1
10 TABLET, ORALLY DISINTEGRATING ORAL ONCE
Status: COMPLETED | OUTPATIENT
Start: 2023-03-14 | End: 2023-03-14

## 2023-03-14 ASSESSMENT — ACTIVITIES OF DAILY LIVING (ADL)
ADLS_ACUITY_SCORE: 37

## 2023-03-14 NOTE — ED PROVIDER NOTES
ED Provider Note  [facility]      History     Chief Complaint   Patient presents with     Suicidal     Generalized Body Aches     Covid19 positive this AM here in ED     HPI  Sadaf Ross is a 22 year old female with history of borderline personality disorder, intellectual disability and bipolar disorder who presents to the Emergency Department via EMS with suicidal ideation. Patient reports plan to hit her head on the wall. She also reports body aches, was seen here in the ED earlier and diagnosed with COVID.     Past Medical History  Past Medical History:   Diagnosis Date     ADHD (attention deficit hyperactivity disorder)      Bipolar 1 disorder (H)      Borderline personality disorder (H)      Depression      Depressive disorder      Intellectual disability      Obesity      Syncope      Past Surgical History:   Procedure Laterality Date     APPENDECTOMY       APPENDECTOMY       acetaminophen (TYLENOL) 325 MG tablet  alum & mag hydroxide-simethicone (MAALOX  ES) 400-400-40 MG/5ML SUSP suspension  ARIPiprazole lauroxil ER (ARISTADA) 882 MG/3.2ML intra-muscular  benzonatate (TESSALON) 200 MG capsule  benztropine (COGENTIN) 1 MG tablet  bisacodyl (DULCOLAX) 5 MG EC tablet  calcium carbonate (TUMS) 500 MG chewable tablet  clobetasol (TEMOVATE) 0.05 % CREA cream  cyclobenzaprine (FLEXERIL) 10 MG tablet  diclofenac (VOLTAREN) 1 % topical gel  docusate sodium (COLACE) 50 MG capsule  fluticasone (FLONASE) 50 MCG/ACT nasal spray  guaiFENesin (MUCINEX) 600 MG 12 hr tablet  hydrocortisone (CORTAID) 0.5 % external cream  hydrOXYzine (ATARAX) 50 MG tablet  hyoscyamine (LEVSIN/SL) 0.125 MG sublingual tablet  ibuprofen (ADVIL/MOTRIN) 400 MG tablet  loperamide (IMODIUM) 2 MG capsule  loratadine (CLARITIN) 10 MG tablet  LORazepam (ATIVAN) 0.5 MG tablet  multivitamin, therapeutic (THERA-VIT) TABS tablet  OLANZapine zydis (ZYPREXA) 5 MG ODT  omeprazole (PRILOSEC) 40 MG DR capsule  ondansetron  (ZOFRAN) 4 MG tablet  polyethylene glycol (MIRALAX) 17 GM/Dose powder  prochlorperazine (COMPAZINE) 10 MG tablet  promethazine (PHENERGAN) 12.5 MG tablet  sennosides (SENOKOT) 8.6 MG tablet  traZODone (DESYREL) 50 MG tablet  venlafaxine (EFFEXOR-ER) 225 MG 24 hr tablet  Vitamin D, Cholecalciferol, 25 MCG (1000 UT) TABS      Allergies   Allergen Reactions     Penicillins Rash and Unknown     Family History  Family History   Problem Relation Age of Onset     Diabetes Type 1 Father      Cancer Paternal Grandfather      Social History   Social History     Tobacco Use     Smoking status: Every Day     Packs/day: 1.00     Years: 5.00     Pack years: 5.00     Types: Vaping Device, Cigarettes     Smokeless tobacco: Never   Substance Use Topics     Alcohol use: No     Drug use: No         A medically appropriate review of systems was performed with pertinent positives and negatives noted in the HPI, and all other systems negative.    Physical Exam   BP: 109/73  Pulse: 103  Temp: 98.2  F (36.8  C)  Resp: 16  SpO2: 98 %  Physical Exam  Vitals and nursing note reviewed.   Constitutional:       General: She is not in acute distress.     Appearance: Normal appearance.   HENT:      Head: Normocephalic.      Nose: Nose normal.   Eyes:      Pupils: Pupils are equal, round, and reactive to light.   Cardiovascular:      Rate and Rhythm: Normal rate and regular rhythm.   Pulmonary:      Effort: Pulmonary effort is normal.   Abdominal:      General: There is no distension.   Musculoskeletal:         General: No deformity. Normal range of motion.      Cervical back: Normal range of motion.   Skin:     General: Skin is warm.   Neurological:      Mental Status: She is alert and oriented to person, place, and time.   Psychiatric:         Attention and Perception: Attention normal.         Mood and Affect: Mood normal.         Thought Content: Thought content is not paranoid or delusional. Thought content includes suicidal ideation. Thought  content does not include homicidal ideation. Thought content includes suicidal plan.         Cognition and Memory: Cognition normal.         Judgment: Judgment normal.           ED Course, Procedures, & Data     ED Course as of 03/14/23 0043   Tue Mar 14, 2023   0012 Attempted to assess patient.  She is not cooperative.  She will stare at me when asked questions and then promptly falls back asleep.  She is unable to verbalize any mental health or physical complaints.     Mental Health Risk Assessment      PSS-3    Date and Time Over the past 2 weeks have you felt down, depressed, or hopeless? Over the past 2 weeks have you had thoughts of killing yourself? Have you ever attempted to kill yourself? When did this last happen? User   03/13/23 2310 yes yes no -- AAB      C-SSRS (Ackerman)    Date and Time Q1 Wished to be Dead (Past Month) Q2 Suicidal Thoughts (Past Month) Q3 Suicidal Thought Method Q4 Suicidal Intent without Specific Plan Q5 Suicide Intent with Specific Plan Q6 Suicide Behavior (Lifetime) Within the Past 3 Months? RETIRED: Level of Risk per Screen Screening Not Complete User   03/13/23 2310 yes yes yes yes no no -- -- -- AAB                Item Assessment   Suicidal Ideation Suicidal intent with specific plan   Plan Hit head against wall   Intent Chronic behavior   Suicidal or self-harm behaviors None   Risk Factors Previous psychiatric diagnosis and treatments   Protective Factors Lives at  with significant support/monitoring              No results found for any visits on 03/13/23.  Medications - No data to display  Labs Ordered and Resulted from Time of ED Arrival to Time of ED Departure - No data to display  No orders to display          Critical care was not performed.     Medical Decision Making  The patient's presentation was of high complexity (an acute health issue posing potential threat to life or bodily function).    The patient's evaluation involved:  review of external note(s) from 1  sources (see separate area of note for details)  discussion of management or test interpretation with another health professional (MH )    The patient's management necessitated only low risk treatment.      Assessment & Plan    Patient well-known to the ED returns for persistent suicidality and generalized body aches.  Recent diagnosis of COVID.    On my exam, patient without signs of respiratory distress.  She has normal vital signs.  Low suspicion for negative sequela from COVID.  Patient without COVID-related complaints aside from myalgias.  No indication for antiviral treatment.      Patient endorses suicidality with plan to bang her head against the wall.  I reviewed prior ED visits and mental health assessments including one from earlier today.  This appears to be her baseline.  There are no acute findings today.  She does not appear to be intoxicated or have evidence of acute psychosis.  She was boarded in the ED until arrangements were able to be made to transfer back to group home who is willing to reaccept her          I have reviewed the nursing notes. I have reviewed the findings, diagnosis, plan and need for follow up with the patient.    New Prescriptions    No medications on file       Final diagnoses:   Borderline personality disorder (H)       Ghassan Browning DO  [facility] EMERGENCY DEPARTMENT  3/13/2023     Ghassan Browning DO  03/15/23 0547

## 2023-03-14 NOTE — ED PROVIDER NOTES
ED Provider Note  [facility]      History     Chief Complaint   Patient presents with     Suicidal     Presents with suicidal thoughts of smashing her head into a wall today, denies having done any self-harm     HPI  Sadaf Ross is a 22 year old female with hx of borderline personality disorder, bipolar disorder, intellectual disability who presents to the ED saying she is going to smash her head in the wall.  She doesn't say why.  She lives at a group home and that is not the issue. She took a taxi here.  She is her own legal guardian.  She was just diagnosed with covid.  Per group home staff she came home from the hospital last night, slept, ate, slept. She woke up at 9 am today and announced she was going to the hospital.  Mother apparently is not involved.  They have monthly team meetings about her and been unable to decrease her visits to the ED.  Group home staff has no concerns regarding suicide as patient has been at baseline.            Past Medical and Surgical History, Medications, Allergies, and Social History were reviewed in the electronic medical record. Review with the patient was attempted but limited due to patient won't answer.      A complete review of systems was attempted but limited due to patient won't answer.    Physical Exam   BP: 118/64  Pulse: 92  Temp: 98.2  F (36.8  C)  Resp: 16  SpO2: 98 %  Physical Exam  Vitals and nursing note reviewed.   Constitutional:       General: She is not in acute distress.     Appearance: She is obese. She is not ill-appearing, toxic-appearing or diaphoretic.      Comments: Appears at baseline   HENT:      Head: Normocephalic and atraumatic.      Nose: No congestion or rhinorrhea.   Eyes:      Extraocular Movements: Extraocular movements intact.   Cardiovascular:      Rate and Rhythm: Normal rate.   Pulmonary:      Effort: Pulmonary effort is normal.   Musculoskeletal:         General: No deformity or signs of injury.       Cervical back: Normal range of motion.   Skin:     Coloration: Skin is not jaundiced or pale.   Neurological:      General: No focal deficit present.      Mental Status: She is alert and oriented to person, place, and time.   Psychiatric:         Attention and Perception: Attention and perception normal.         Mood and Affect: Affect is angry.         Speech: Speech normal.         Behavior: Behavior is uncooperative.         Thought Content: Thought content includes suicidal (chronic) ideation.         Cognition and Memory: Cognition is impaired.         Judgment: Judgment is impulsive.           ED Course, Procedures, & Data      Procedures         Mental Health Risk Assessment      PSS-3    Date and Time Over the past 2 weeks have you felt down, depressed, or hopeless? Over the past 2 weeks have you had thoughts of killing yourself? Have you ever attempted to kill yourself? When did this last happen? User   03/14/23 1308 yes yes yes within the last month (but not today) KARIS      C-SSRS ([place])    Date and Time Q1 Wished to be Dead (Past Month) Q2 Suicidal Thoughts (Past Month) Q3 Suicidal Thought Method Q4 Suicidal Intent without Specific Plan Q5 Suicide Intent with Specific Plan Q6 Suicide Behavior (Lifetime) Within the Past 3 Months? RETIRED: Level of Risk per Screen Screening Not Complete User   03/14/23 1308 yes yes yes no yes yes -- -- --                 Item Assessment   Suicidal Ideation Suicidal thoughts   Plan Hit head/chronic thoughts   Intent None       Suicidal or self-harm behaviors none   Risk Factors Previous psychiatric diagnosis and treatments   Protective Factors Group home staff and family/just became and aunt                No results found for any visits on 03/14/23.  Medications - No data to display  Labs Ordered and Resulted from Time of ED Arrival to Time of ED Departure - No data to display  No orders to display          Critical care was not performed.     Medical Decision  "Making  The patient's presentation was of low complexity (a stable chronic illness).    The patient's evaluation involved:  an assessment requiring an independent historian (see separate area of note for details)  review of external note(s) from 3+ sources (see separate area of note for details)  discussion of management or test interpretation with another health professional (see separate area of note for details)    The patient's management necessitated moderate risk (limitations due to social determinants of health (see separate area of note for details)).      Assessment & Plan    Sadaf Ross is a 22 year old female with hx of borderline personality disorder, bipolar disorder, intellectual disability who presents to the ED saying she is going to smash her head in the wall. This is her 3rd visit in 24 hours.  She is known for coming to the ED several times a week with chronic suicidal ideation.  she has a care plan that indicates admission is not effective and that if not new concerns she should be discharged back to her group home.  She tends to complain of gi issues or suicide at means to coming to the ED. Her care plan is not working and she continues to come.  I confronted her about this and stated that this can't keep happening. She became angry and yelled at me stating that  \" I can f###ing\"  come whenever I want\" and stated she wasn't going to change her behavior.  I offered her zyprexa and she told me she wasn't going to \"F###ing\" take it and then called me a bitch.  I asked her why she continues to come and if there was something we did not address prior or if she was worried about her covid dx.  She told me to leave her alone.  She was yelling.  She was then crying and nursing staff sat with her.  The patient comes almost daily to the ED.  She presents today at baseline and presents this way at each visit.  She was offered zyprexa 10 mg po.  She tells me she likes her group home.  I spoke to her " group home staff and discussed the need to change these frequents visit that are for secondary gain by the patient.  The group home staff says that her team visits monthly and they asked the patient if she wanted a guardian but the refused.  I have passed my concerns of frequent visits on to the group home, nursing director and P.  I feel she would benefit from having a guardian and have told these respective groups by thoughts. The patient is at baseline with chronic si, group [facility] accepts her back and she was discharged back there via ambulance.       I have reviewed the nursing notes. I have reviewed the findings, diagnosis, plan and need for follow up with the patient.    New Prescriptions    No medications on file       Final diagnoses:   Borderline personality disorder (H)   Intellectual disability       Ashely Toth MD  [facility] EMERGENCY DEPARTMENT  3/14/2023     Ashely Toth MD  03/16/23 1055

## 2023-03-14 NOTE — ED TRIAGE NOTES
Triage Assessment     Row Name 03/14/23 2737       Triage Assessment (Adult)    Airway WDL WDL       Respiratory WDL    Respiratory WDL WDL       Skin Circulation/Temperature WDL    Skin Circulation/Temperature WDL WDL       Cardiac WDL    Cardiac WDL X;chest pain       Chest Pain Assessment    Chest Pain Location midsternal    Associated Signs/Symptoms anxiety       Peripheral/Neurovascular WDL    Peripheral Neurovascular WDL WDL       Cognitive/Neuro/Behavioral WDL    Cognitive/Neuro/Behavioral WDL WDL

## 2023-03-14 NOTE — ED TRIAGE NOTES
Pt comes in by EMS. Pt suicidal, plan to hit head on wall. Pt is also having some body aches. Pt was seen here in this ER earlier this afternoon. Pt was diagnosed with covid-19.      Triage Assessment     Row Name 03/13/23 2846       Triage Assessment (Adult)    Airway WDL WDL       Respiratory WDL    Respiratory WDL WDL       Skin Circulation/Temperature WDL    Skin Circulation/Temperature WDL WDL       Cardiac WDL    Cardiac WDL WDL       Peripheral/Neurovascular WDL    Peripheral Neurovascular WDL WDL       Cognitive/Neuro/Behavioral WDL    Cognitive/Neuro/Behavioral WDL WDL

## 2023-03-14 NOTE — DISCHARGE INSTRUCTIONS
Discharge back to your group home via ambulance.     Continue working with your current providers.

## 2023-03-15 ENCOUNTER — NURSE TRIAGE (OUTPATIENT)
Dept: NURSING | Facility: CLINIC | Age: 23
End: 2023-03-15
Payer: MEDICARE

## 2023-03-15 ENCOUNTER — APPOINTMENT (OUTPATIENT)
Dept: GENERAL RADIOLOGY | Facility: CLINIC | Age: 23
End: 2023-03-15
Attending: EMERGENCY MEDICINE
Payer: MEDICARE

## 2023-03-15 ENCOUNTER — HOSPITAL ENCOUNTER (EMERGENCY)
Facility: CLINIC | Age: 23
Discharge: HOME | End: 2023-03-15
Attending: EMERGENCY MEDICINE | Admitting: EMERGENCY MEDICINE
Payer: MEDICARE

## 2023-03-15 VITALS
WEIGHT: 237 LBS | SYSTOLIC BLOOD PRESSURE: 95 MMHG | OXYGEN SATURATION: 96 % | BODY MASS INDEX: 40.68 KG/M2 | HEART RATE: 83 BPM | DIASTOLIC BLOOD PRESSURE: 68 MMHG | RESPIRATION RATE: 18 BRPM | TEMPERATURE: 97.7 F

## 2023-03-15 DIAGNOSIS — Z53.20 SURGICAL OR PROCEDURE NOT CARRIED OUT BECAUSE OF PATIENT'S DECISION: ICD-10-CM

## 2023-03-15 DIAGNOSIS — R07.89 OTHER CHEST PAIN: ICD-10-CM

## 2023-03-15 DIAGNOSIS — U07.1 INFECTION DUE TO 2019 NOVEL CORONAVIRUS: ICD-10-CM

## 2023-03-15 DIAGNOSIS — R07.9 ACUTE CHEST PAIN: ICD-10-CM

## 2023-03-15 DIAGNOSIS — R45.851 SUICIDAL IDEATION: ICD-10-CM

## 2023-03-15 LAB
ALBUMIN SERPL BCG-MCNC: 4.7 G/DL (ref 3.5–5.2)
ALP SERPL-CCNC: 71 U/L (ref 35–104)
ALT SERPL W P-5'-P-CCNC: 33 U/L (ref 10–35)
ANION GAP SERPL CALCULATED.3IONS-SCNC: 14 MMOL/L (ref 7–15)
AST SERPL W P-5'-P-CCNC: 22 U/L (ref 10–35)
BASOPHILS # BLD AUTO: 0 10E3/UL (ref 0–0.2)
BASOPHILS NFR BLD AUTO: 1 %
BILIRUB SERPL-MCNC: 0.3 MG/DL
BUN SERPL-MCNC: 10.7 MG/DL (ref 6–20)
CALCIUM SERPL-MCNC: 9.6 MG/DL (ref 8.6–10)
CHLORIDE SERPL-SCNC: 100 MMOL/L (ref 98–107)
CREAT SERPL-MCNC: 0.89 MG/DL (ref 0.51–0.95)
D DIMER PPP FEU-MCNC: 0.27 UG/ML FEU (ref 0–0.5)
DEPRECATED HCO3 PLAS-SCNC: 24 MMOL/L (ref 22–29)
EOSINOPHIL # BLD AUTO: 0.1 10E3/UL (ref 0–0.7)
EOSINOPHIL NFR BLD AUTO: 2 %
ERYTHROCYTE [DISTWIDTH] IN BLOOD BY AUTOMATED COUNT: 12.9 % (ref 10–15)
FLUAV RNA SPEC QL NAA+PROBE: NEGATIVE
FLUBV RNA RESP QL NAA+PROBE: NEGATIVE
GFR SERPL CREATININE-BSD FRML MDRD: >90 ML/MIN/1.73M2
GLUCOSE SERPL-MCNC: 85 MG/DL (ref 70–99)
HCG SERPL QL: NEGATIVE
HCT VFR BLD AUTO: 40.1 % (ref 35–47)
HGB BLD-MCNC: 13.5 G/DL (ref 11.7–15.7)
IMM GRANULOCYTES # BLD: 0 10E3/UL
IMM GRANULOCYTES NFR BLD: 0 %
LYMPHOCYTES # BLD AUTO: 1.7 10E3/UL (ref 0.8–5.3)
LYMPHOCYTES NFR BLD AUTO: 28 %
MCH RBC QN AUTO: 27.9 PG (ref 26.5–33)
MCHC RBC AUTO-ENTMCNC: 33.7 G/DL (ref 31.5–36.5)
MCV RBC AUTO: 83 FL (ref 78–100)
MONOCYTES # BLD AUTO: 0.5 10E3/UL (ref 0–1.3)
MONOCYTES NFR BLD AUTO: 8 %
NEUTROPHILS # BLD AUTO: 3.8 10E3/UL (ref 1.6–8.3)
NEUTROPHILS NFR BLD AUTO: 61 %
NRBC # BLD AUTO: 0 10E3/UL
NRBC BLD AUTO-RTO: 0 /100
PLATELET # BLD AUTO: 212 10E3/UL (ref 150–450)
POTASSIUM SERPL-SCNC: 4.1 MMOL/L (ref 3.4–5.3)
PROT SERPL-MCNC: 8 G/DL (ref 6.4–8.3)
RBC # BLD AUTO: 4.84 10E6/UL (ref 3.8–5.2)
RSV RNA SPEC NAA+PROBE: NEGATIVE
SARS-COV-2 RNA RESP QL NAA+PROBE: POSITIVE
SODIUM SERPL-SCNC: 138 MMOL/L (ref 136–145)
TROPONIN T SERPL HS-MCNC: <6 NG/L
WBC # BLD AUTO: 6.1 10E3/UL (ref 4–11)

## 2023-03-15 PROCEDURE — 84484 ASSAY OF TROPONIN QUANT: CPT | Performed by: EMERGENCY MEDICINE

## 2023-03-15 PROCEDURE — 71046 X-RAY EXAM CHEST 2 VIEWS: CPT

## 2023-03-15 PROCEDURE — 99285 EMERGENCY DEPT VISIT HI MDM: CPT | Mod: CS,25

## 2023-03-15 PROCEDURE — 85379 FIBRIN DEGRADATION QUANT: CPT | Performed by: EMERGENCY MEDICINE

## 2023-03-15 PROCEDURE — 80053 COMPREHEN METABOLIC PANEL: CPT | Performed by: EMERGENCY MEDICINE

## 2023-03-15 PROCEDURE — 84703 CHORIONIC GONADOTROPIN ASSAY: CPT | Performed by: EMERGENCY MEDICINE

## 2023-03-15 PROCEDURE — 36415 COLL VENOUS BLD VENIPUNCTURE: CPT | Performed by: EMERGENCY MEDICINE

## 2023-03-15 PROCEDURE — 85025 COMPLETE CBC W/AUTO DIFF WBC: CPT | Performed by: EMERGENCY MEDICINE

## 2023-03-15 PROCEDURE — 96375 TX/PRO/DX INJ NEW DRUG ADDON: CPT

## 2023-03-15 PROCEDURE — 87637 SARSCOV2&INF A&B&RSV AMP PRB: CPT | Performed by: EMERGENCY MEDICINE

## 2023-03-15 PROCEDURE — 96374 THER/PROPH/DIAG INJ IV PUSH: CPT

## 2023-03-15 PROCEDURE — 258N000003 HC RX IP 258 OP 636: Performed by: EMERGENCY MEDICINE

## 2023-03-15 PROCEDURE — C9803 HOPD COVID-19 SPEC COLLECT: HCPCS

## 2023-03-15 PROCEDURE — 99284 EMERGENCY DEPT VISIT MOD MDM: CPT | Mod: CS | Performed by: EMERGENCY MEDICINE

## 2023-03-15 PROCEDURE — 250N000011 HC RX IP 250 OP 636: Performed by: EMERGENCY MEDICINE

## 2023-03-15 PROCEDURE — 96361 HYDRATE IV INFUSION ADD-ON: CPT

## 2023-03-15 RX ORDER — KETOROLAC TROMETHAMINE 15 MG/ML
15 INJECTION, SOLUTION INTRAMUSCULAR; INTRAVENOUS ONCE
Status: COMPLETED | OUTPATIENT
Start: 2023-03-15 | End: 2023-03-15

## 2023-03-15 RX ORDER — ONDANSETRON 4 MG/1
4 TABLET, FILM COATED ORAL EVERY 8 HOURS PRN
Qty: 15 TABLET | Refills: 0 | Status: SHIPPED | OUTPATIENT
Start: 2023-03-15

## 2023-03-15 RX ORDER — ONDANSETRON 2 MG/ML
4 INJECTION INTRAMUSCULAR; INTRAVENOUS EVERY 30 MIN PRN
Status: DISCONTINUED | OUTPATIENT
Start: 2023-03-15 | End: 2023-03-15 | Stop reason: HOSPADM

## 2023-03-15 RX ORDER — SODIUM CHLORIDE 9 MG/ML
INJECTION, SOLUTION INTRAVENOUS CONTINUOUS
Status: DISCONTINUED | OUTPATIENT
Start: 2023-03-15 | End: 2023-03-15 | Stop reason: HOSPADM

## 2023-03-15 RX ADMIN — ONDANSETRON 4 MG: 2 INJECTION INTRAMUSCULAR; INTRAVENOUS at 17:27

## 2023-03-15 RX ADMIN — KETOROLAC TROMETHAMINE 15 MG: 15 INJECTION, SOLUTION INTRAMUSCULAR; INTRAVENOUS at 17:27

## 2023-03-15 RX ADMIN — SODIUM CHLORIDE 1000 ML: 9 INJECTION, SOLUTION INTRAVENOUS at 17:27

## 2023-03-15 ASSESSMENT — ACTIVITIES OF DAILY LIVING (ADL)
ADLS_ACUITY_SCORE: 37
ADLS_ACUITY_SCORE: 37

## 2023-03-15 NOTE — TELEPHONE ENCOUNTER
Nurse Triage SBAR    Is this a 2nd Level Triage? NO    Situation: Seen in ED yesterday and 3/13 COVID positive    Background: Borderline personality disorder, bipolar, recent COVID, suicidal ideations, patient frequents ED and has had multiple visits recently with suicidal ideations.     Assessment: Positive COVID 3/10  Patient states she is having 9/10 back pain, no injury. Pain goes into her ribs and into chest. Is feeling clammy and sweaty right now, states she has visible sweat on face and felt like she was going to pass out. She lives [facility] and says there are people downstairs that can help her to call 911 but she can do herself.     Protocol Recommended Disposition:   Call  Now, See More Appropriate Protocol       Lauren Littlejohn RN on 3/15/2023 at 1:51 PM      Reason for Disposition    Pain in the upper back over the ribs (rib cage) that radiates (travels) into the chest    Visible sweat on face or sweat dripping down face    Additional Information    Negative: Passed out (i.e., fainted, collapsed and was not responding)    Negative: Shock suspected (e.g., cold/pale/clammy skin, too weak to stand, low BP, rapid pulse)    Negative: Sounds like a life-threatening emergency to the triager    Negative: Major injury to the back (e.g., MVA, fall > 10 feet or 3 meters, penetrating injury, etc.)    Negative: SEVERE difficulty breathing (e.g., struggling for each breath, speaks in single words)    Negative: Passed out (i.e., fainted, collapsed and was not responding)    Negative: Difficult to awaken or acting confused (e.g., disoriented, slurred speech)    Negative: Shock suspected (e.g., cold/pale/clammy skin, too weak to stand, low BP, rapid pulse)    Negative: Chest pain lasting longer than 5 minutes and ANY of the following:* Over 44 years old* Over 30 years old and at least one cardiac risk factor (e.g., diabetes mellitus, high blood pressure, high cholesterol, smoker, or strong family history of  heart disease)* History of heart disease (i.e., angina, heart attack, heart failure, bypass surgery, takes nitroglycerin)* Pain is crushing, pressure-like, or heavy    Negative: Heart beating < 50 beats per minute OR > 140 beats per minute    Protocols used: BACK PAIN-A-OH, CHEST PAIN-A-OH

## 2023-03-15 NOTE — ED PROVIDER NOTES
History     Chief Complaint   Patient presents with     Suicidal     Back Pain            Chest Pain     HPI  Sadaf Ross is a 22 year old female with a past medical history of bipolar disorder, borderline personality disorder, chest pain, obesity, intellectual disability, syncope  who presents to the emergency department with a chief complaint of chest pain.  It is associated with back pain/pain radiating into her ribs.  The patient also reports feeling diaphoretic and was noted to have visible sweat on her face.  She felt as if she was going to pass out, but did not do so.  She also complains of her ongoing chronic suicidal ideation, she states that this has not changed from her baseline.  Patient was diagnosed with COVID on 3/13.  She states she is feeling chilled and achy and has had nausea and vomiting as well.    The patient is frequently seen in the emergency department for both suicidal ideation and chest pain.    I have reviewed the Medications, Allergies, Past Medical and Surgical History, and Social History in the yetu system.    Past Medical History:   Diagnosis Date     ADHD (attention deficit hyperactivity disorder)      Bipolar 1 disorder (H)      Borderline personality disorder (H)      Depression      Depressive disorder      Intellectual disability      Obesity      Syncope      Past Surgical History:   Procedure Laterality Date     APPENDECTOMY       APPENDECTOMY       No current facility-administered medications for this encounter.     Current Outpatient Medications   Medication     acetaminophen (TYLENOL) 325 MG tablet     alum & mag hydroxide-simethicone (MAALOX  ES) 400-400-40 MG/5ML SUSP suspension     ARIPiprazole lauroxil ER (ARISTADA) 882 MG/3.2ML intra-muscular     benzonatate (TESSALON) 200 MG capsule     benztropine (COGENTIN) 1 MG tablet     bisacodyl (DULCOLAX) 5 MG EC tablet     calcium carbonate (TUMS) 500 MG chewable tablet     clobetasol (TEMOVATE) 0.05 % CREA cream      cyclobenzaprine (FLEXERIL) 10 MG tablet     diclofenac (VOLTAREN) 1 % topical gel     docusate sodium (COLACE) 50 MG capsule     fluticasone (FLONASE) 50 MCG/ACT nasal spray     guaiFENesin (MUCINEX) 600 MG 12 hr tablet     hydrocortisone (CORTAID) 0.5 % external cream     hydrOXYzine (ATARAX) 50 MG tablet     hyoscyamine (LEVSIN/SL) 0.125 MG sublingual tablet     ibuprofen (ADVIL/MOTRIN) 400 MG tablet     loperamide (IMODIUM) 2 MG capsule     loratadine (CLARITIN) 10 MG tablet     LORazepam (ATIVAN) 0.5 MG tablet     multivitamin, therapeutic (THERA-VIT) TABS tablet     OLANZapine zydis (ZYPREXA) 5 MG ODT     omeprazole (PRILOSEC) 40 MG DR capsule     ondansetron (ZOFRAN) 4 MG tablet     polyethylene glycol (MIRALAX) 17 GM/Dose powder     prochlorperazine (COMPAZINE) 10 MG tablet     promethazine (PHENERGAN) 12.5 MG tablet     sennosides (SENOKOT) 8.6 MG tablet     traZODone (DESYREL) 50 MG tablet     venlafaxine (EFFEXOR-ER) 225 MG 24 hr tablet     Vitamin D, Cholecalciferol, 25 MCG (1000 UT) TABS     Allergies   Allergen Reactions     Penicillins Rash and Unknown     Past medical history, past surgical history, medications, and allergies were reviewed with the patient. Additional pertinent items: None    Social History     Socioeconomic History     Marital status: Single     Spouse name: Not on file     Number of children: Not on file     Years of education: Not on file     Highest education level: Not on file   Occupational History     Not on file   Tobacco Use     Smoking status: Every Day     Packs/day: 1.00     Years: 5.00     Pack years: 5.00     Types: Vaping Device, Cigarettes     Smokeless tobacco: Never   Substance and Sexual Activity     Alcohol use: No     Drug use: No     Sexual activity: Not Currently     Partners: Female, Male     Birth control/protection: Pill   Other Topics Concern     Not on file   Social History Narrative     Not on file     Social Determinants of Health     Financial Resource  Strain: Not on file   Food Insecurity: Not on file   Transportation Needs: Not on file   Physical Activity: Not on file   Stress: Not on file   Social Connections: Not on file   Intimate Partner Violence: Not on file   Housing Stability: Not on file     Social history was reviewed with the patient. Additional pertinent items: None    Review of Systems  A medically appropriate review of systems was performed with pertinent positives and negatives noted in the HPI, and all other systems negative.    Physical Exam   BP: (!) 155/93  Pulse: 95  Temp: 98.3  F (36.8  C)  Resp: 18  Weight: 107.5 kg (237 lb)  SpO2: 100 %      General: Well nourished, well developed, NAD  HEENT: EOMI, anicteric. NCAT, MMM  Neck: no jugular venous distension, supple, nl ROM  Cardiac: Regular rate and rhythm. No murmurs, rubs, or gallops. Normal S1, S2.  Intact peripheral pulses  Pulm: CTAB, no stridor, wheezes, rales, rhonchi  Abd: Soft, nontender, nondistended.  No masses palpated.    Skin: Warm and dry to the touch.  No rash  Extremities: No LE edema, no cyanosis, w/w/p  Neuro: A&Ox3, no gross focal deficits  Psych: Patient endorses ongoing suicidal ideation, unchanged from her baseline.    ED Course        Procedures                                 Labs Ordered and Resulted from Time of ED Arrival to Time of ED Departure   INFLUENZA A/B, RSV, & SARS-COV2 PCR - Abnormal       Result Value    Influenza A PCR Negative      Influenza B PCR Negative      RSV PCR Negative      SARS CoV2 PCR Positive (*)    COMPREHENSIVE METABOLIC PANEL - Normal    Sodium 138      Potassium 4.1      Chloride 100      Carbon Dioxide (CO2) 24      Anion Gap 14      Urea Nitrogen 10.7      Creatinine 0.89      Calcium 9.6      Glucose 85      Alkaline Phosphatase 71      AST 22      ALT 33      Protein Total 8.0      Albumin 4.7      Bilirubin Total 0.3      GFR Estimate >90     TROPONIN T, HIGH SENSITIVITY - Normal    Troponin T, High Sensitivity <6     HCG  QUALITATIVE PREGNANCY - Normal    hCG Serum Qualitative Negative     D DIMER QUANTITATIVE - Normal    D-Dimer Quantitative 0.27     CBC WITH PLATELETS AND DIFFERENTIAL    WBC Count 6.1      RBC Count 4.84      Hemoglobin 13.5      Hematocrit 40.1      MCV 83      MCH 27.9      MCHC 33.7      RDW 12.9      Platelet Count 212      % Neutrophils 61      % Lymphocytes 28      % Monocytes 8      % Eosinophils 2      % Basophils 1      % Immature Granulocytes 0      NRBCs per 100 WBC 0      Absolute Neutrophils 3.8      Absolute Lymphocytes 1.7      Absolute Monocytes 0.5      Absolute Eosinophils 0.1      Absolute Basophils 0.0      Absolute Immature Granulocytes 0.0      Absolute NRBCs 0.0              Results for orders placed or performed during the hospital encounter of 03/15/23 (from the past 24 hour(s))   CBC with platelets differential    Narrative    The following orders were created for panel order CBC with platelets differential.  Procedure                               Abnormality         Status                     ---------                               -----------         ------                     CBC with platelets and d...[[id number]]                      Final result                 Please view results for these tests on the individual orders.   Comprehensive metabolic panel   Result Value Ref Range    Sodium 138 136 - 145 mmol/L    Potassium 4.1 3.4 - 5.3 mmol/L    Chloride 100 98 - 107 mmol/L    Carbon Dioxide (CO2) 24 22 - 29 mmol/L    Anion Gap 14 7 - 15 mmol/L    Urea Nitrogen 10.7 6.0 - 20.0 mg/dL    Creatinine 0.89 0.51 - 0.95 mg/dL    Calcium 9.6 8.6 - 10.0 mg/dL    Glucose 85 70 - 99 mg/dL    Alkaline Phosphatase 71 35 - 104 U/L    AST 22 10 - 35 U/L    ALT 33 10 - 35 U/L    Protein Total 8.0 6.4 - 8.3 g/dL    Albumin 4.7 3.5 - 5.2 g/dL    Bilirubin Total 0.3 <=1.2 mg/dL    GFR Estimate >90 >60 mL/min/1.73m2   Troponin T, High Sensitivity   Result Value Ref Range    Troponin T, High Sensitivity  <6 <=14 ng/L   HCG qualitative Blood   Result Value Ref Range    hCG Serum Qualitative Negative Negative   D dimer quantitative   Result Value Ref Range    D-Dimer Quantitative 0.27 0.00 - 0.50 ug/mL FEU    Narrative    This D-dimer assay is intended for use in conjunction with a clinical pretest probability assessment model to exclude pulmonary embolism (PE) and deep venous thrombosis (DVT) in outpatients suspected of PE or DVT. The cut-off value is 0.50 ug/mL FEU.   CBC with platelets and differential   Result Value Ref Range    WBC Count 6.1 4.0 - 11.0 10e3/uL    RBC Count 4.84 3.80 - 5.20 10e6/uL    Hemoglobin 13.5 11.7 - 15.7 g/dL    Hematocrit 40.1 35.0 - 47.0 %    MCV 83 78 - 100 fL    MCH 27.9 26.5 - 33.0 pg    MCHC 33.7 31.5 - 36.5 g/dL    RDW 12.9 10.0 - 15.0 %    Platelet Count 212 150 - 450 10e3/uL    % Neutrophils 61 %    % Lymphocytes 28 %    % Monocytes 8 %    % Eosinophils 2 %    % Basophils 1 %    % Immature Granulocytes 0 %    NRBCs per 100 WBC 0 <1 /100    Absolute Neutrophils 3.8 1.6 - 8.3 10e3/uL    Absolute Lymphocytes 1.7 0.8 - 5.3 10e3/uL    Absolute Monocytes 0.5 0.0 - 1.3 10e3/uL    Absolute Eosinophils 0.1 0.0 - 0.7 10e3/uL    Absolute Basophils 0.0 0.0 - 0.2 10e3/uL    Absolute Immature Granulocytes 0.0 <=0.4 10e3/uL    Absolute NRBCs 0.0 10e3/uL   Symptomatic Influenza A/B, RSV, & SARS-CoV2 PCR (COVID-19) Nasopharyngeal    Specimen: Nasopharyngeal; Swab   Result Value Ref Range    Influenza A PCR Negative Negative    Influenza B PCR Negative Negative    RSV PCR Negative Negative    SARS CoV2 PCR Positive (A) Negative    Narrative    Testing was performed using the Xpert Xpress CoV2/Flu/RSV Assay on the Cepheid GeneXpert Instrument. This test should be ordered for the detection of SARS-CoV-2, influenza, and RSV viruses in individuals who meet clinical and/or epidemiological criteria. Test performance is unknown in asymptomatic patients. This test is for in vitro diagnostic use under the  FDA EUA for laboratories certified under CLIA to perform high or moderate complexity testing. This test has not been FDA cleared or approved. A negative result does not rule out the presence of PCR inhibitors in the specimen or target RNA in concentration below the limit of detection for the assay. If only one viral target is positive but coinfection with multiple targets is suspected, the sample should be re-tested with another FDA cleared, approved, or authorized test, if coinfection would change clinical management. This test was validated by the [facility]. These laboratories are certified under the Clinical Laboratory Improvement Amendments of 1988 (CLIA-88) as qualified to perform high complexity laboratory testing.   XR Chest 2 Views    Narrative    EXAM: XR CHEST 2 VIEWS  LOCATION: [facility]  DATE/TIME: 3/15/2023 5:27 PM    INDICATION: chest pain  COMPARISON: 01/14/2023      Impression    IMPRESSION: Negative chest.       Labs, vital signs, and imaging studies were reviewed by me.    Medications   0.9% sodium chloride BOLUS (0 mLs Intravenous Stopped 3/15/23 1812)     Followed by   sodium chloride 0.9% infusion (has no administration in time range)   ondansetron (ZOFRAN) injection 4 mg (4 mg Intravenous $Given 3/15/23 1727)   ketorolac (TORADOL) injection 15 mg (15 mg Intravenous $Given 3/15/23 1727)       Assessments & Plan (with Medical Decision Making)   Sadaf Ross is a 22 year old female who presents with chest pain in the setting of known COVID infection.  Differential diagnosis includes pain related to patient's known COVID-19 infection, pulmonary embolism, secondary bacterial infection, ACS, musculoskeletal chest wall pain, anxiety. Labs, EKG, CXR ordered to further evaluate the patient.  Medications were ordered for symptomatic relief in the emergency department.    EKG was ordered, but patient declined this test    Labs  remarkable for positive COVID testing, troponin within normal limits at less than 6. D dimer also within normal limits    CXR shows no acute disease process    Critical care was not performed.     Medical Decision Making  The patient's presentation was of high complexity (an acute health issue posing potential threat to life or bodily function).    The patient's evaluation involved:  ordering and/or review of 3+ test(s) in this encounter (see separate area of note for details)  review of 3+ test result(s) ordered prior to this encounter (see separate area of note for details)  independent interpretation of testing performed by another health professional (CXR)    The patient's management necessitated moderate risk (prescription drug management including medications given in the ED).      I have reviewed the nursing notes.    I have reviewed the findings, diagnosis, plan and need for follow up with the patient.    Patient to be discharged home. Advised to follow up with PCP and mental health resources as scheduled. To return to ER immediately with any new/worsening symptoms. Plan of care discussed with patient who expresses understanding and agrees with plan of care.  Patient was advised on ways to minimize the risk of transmission of COVID to other residents of her group home, including staying in her room when possible and making sure to wear a mask in any common areas.  Frequent handwashing was also advised.    New Prescriptions    ONDANSETRON (ZOFRAN) 4 MG TABLET    Take 1 tablet (4 mg) by mouth every 8 hours as needed       Final diagnoses:   Acute chest pain   Infection due to 2019 novel coronavirus   Suicidal ideation       Tori Whalen MD  3/15/2023   [facility] EMERGENCY DEPARTMENT     Tori Whalen MD  03/15/23 6767

## 2023-03-15 NOTE — DISCHARGE INSTRUCTIONS
TODAY'S VISIT:  You were seen today for chest pain     You may take Ibuprofen and/or Tylenol as needed for pain. You can take 600 mg of Ibuprofen every 6 hours and 1 g Tylenol every 6 hours. It may help to alternate these, so you take something every 3 hours.   -   - If you had any labs or imaging/radiology tests performed today, you should also discuss these tests with your usual provider.     FOLLOW-UP:  Please make an appointment to follow up with:  - Your Primary Care Provider. If you do not have a PCP, please call the Primary Care Center (phone: [phone] for an appointment    - Have your provider review the results from today's visit with you again to make sure no further follow-up or additional testing is needed based on those results.     RETURN TO THE EMERGENCY DEPARTMENT  Return to the Emergency Department at any time for any new or worsening symptoms or any concerns.

## 2023-03-15 NOTE — ED NOTES
"Patient presenting with back and chest pain, denies injury. Patient refusing EKG. Reporting SI with a plan \"to bang my head into the wall until I die\". 1:1 sitter present.   "

## 2023-03-16 ENCOUNTER — HOSPITAL ENCOUNTER (EMERGENCY)
Facility: CLINIC | Age: 23
Discharge: HOME | End: 2023-03-16
Attending: FAMILY MEDICINE | Admitting: FAMILY MEDICINE
Payer: MEDICARE

## 2023-03-16 VITALS
DIASTOLIC BLOOD PRESSURE: 79 MMHG | RESPIRATION RATE: 18 BRPM | HEIGHT: 64 IN | OXYGEN SATURATION: 100 % | TEMPERATURE: 98.4 F | BODY MASS INDEX: 40.63 KG/M2 | SYSTOLIC BLOOD PRESSURE: 120 MMHG | HEART RATE: 73 BPM | WEIGHT: 238 LBS

## 2023-03-16 DIAGNOSIS — F79 INTELLECTUAL DISABILITY: ICD-10-CM

## 2023-03-16 DIAGNOSIS — U07.1 INFECTION DUE TO 2019 NOVEL CORONAVIRUS: ICD-10-CM

## 2023-03-16 DIAGNOSIS — F60.3 BORDERLINE PERSONALITY DISORDER (H): ICD-10-CM

## 2023-03-16 PROCEDURE — 99282 EMERGENCY DEPT VISIT SF MDM: CPT

## 2023-03-16 PROCEDURE — 99283 EMERGENCY DEPT VISIT LOW MDM: CPT | Performed by: FAMILY MEDICINE

## 2023-03-16 ASSESSMENT — ACTIVITIES OF DAILY LIVING (ADL): ADLS_ACUITY_SCORE: 37

## 2023-03-16 NOTE — DISCHARGE INSTRUCTIONS
Continue with your current therapist.  Use Tylenol or ibuprofen for body aches.  Continue to quarantine, rest as much as possible.

## 2023-03-16 NOTE — ED PROVIDER NOTES
"ED Provider Note  [facility]      History     Chief Complaint   Patient presents with     Suicidal     Patient presents due to suicidal thoughts. When asked about back pain and COVID; Patient reports not being here for that. Patient reports being here for \"my usual.\" Patient reports wanting to go home and kill herself.      HPI  Sadaf Ross is a 22 year old female  with history of intellectual disability, borderline personality disorder, bipolar 1 disorder who is well-known to the ED d/t her frequent ED visits presenting to the ED for suicidal ideations.   Patient recently was diagnosed with COVID.  She has some myalgias including lower back pain, nausea.  States she is very tired and is having suicidal thoughts.  She states she has these thoughts every day, has been seen in the ED numerous times.  Patient has a long history of frequent ED visits and has a care plan.    Past Medical History  Past Medical History:   Diagnosis Date     ADHD (attention deficit hyperactivity disorder)      Bipolar 1 disorder (H)      Borderline personality disorder (H)      Depression      Depressive disorder      Intellectual disability      Obesity      Syncope      Past Surgical History:   Procedure Laterality Date     APPENDECTOMY       APPENDECTOMY       acetaminophen (TYLENOL) 325 MG tablet  alum & mag hydroxide-simethicone (MAALOX  ES) 400-400-40 MG/5ML SUSP suspension  ARIPiprazole lauroxil ER (ARISTADA) 882 MG/3.2ML intra-muscular  benzonatate (TESSALON) 200 MG capsule  benztropine (COGENTIN) 1 MG tablet  bisacodyl (DULCOLAX) 5 MG EC tablet  calcium carbonate (TUMS) 500 MG chewable tablet  clobetasol (TEMOVATE) 0.05 % CREA cream  cyclobenzaprine (FLEXERIL) 10 MG tablet  diclofenac (VOLTAREN) 1 % topical gel  docusate sodium (COLACE) 50 MG capsule  fluticasone (FLONASE) 50 MCG/ACT nasal spray  guaiFENesin (MUCINEX) 600 MG 12 hr tablet  hydrocortisone (CORTAID) 0.5 % external cream  hydrOXYzine " "(ATARAX) 50 MG tablet  hyoscyamine (LEVSIN/SL) 0.125 MG sublingual tablet  ibuprofen (ADVIL/MOTRIN) 400 MG tablet  loperamide (IMODIUM) 2 MG capsule  loratadine (CLARITIN) 10 MG tablet  LORazepam (ATIVAN) 0.5 MG tablet  multivitamin, therapeutic (THERA-VIT) TABS tablet  OLANZapine zydis (ZYPREXA) 5 MG ODT  omeprazole (PRILOSEC) 40 MG DR capsule  ondansetron (ZOFRAN) 4 MG tablet  ondansetron (ZOFRAN) 4 MG tablet  polyethylene glycol (MIRALAX) 17 GM/Dose powder  prochlorperazine (COMPAZINE) 10 MG tablet  promethazine (PHENERGAN) 12.5 MG tablet  sennosides (SENOKOT) 8.6 MG tablet  traZODone (DESYREL) 50 MG tablet  venlafaxine (EFFEXOR-ER) 225 MG 24 hr tablet  Vitamin D, Cholecalciferol, 25 MCG (1000 UT) TABS      Allergies   Allergen Reactions     Penicillins Rash and Unknown     Family History  Family History   Problem Relation Age of Onset     Diabetes Type 1 Father      Cancer Paternal Grandfather      Social History   Social History     Tobacco Use     Smoking status: Every Day     Packs/day: 1.00     Years: 5.00     Pack years: 5.00     Types: Vaping Device, Cigarettes     Smokeless tobacco: Never   Substance Use Topics     Alcohol use: No     Drug use: No         A medically appropriate review of systems was performed with pertinent positives and negatives noted in the HPI, and all other systems negative.    Physical Exam   BP: 120/79  Pulse: 73  Temp: 98.4  F (36.9  C)  Resp: 18  Height: 162.6 cm (5' 4\")  Weight: 108 kg (238 lb)  SpO2: 100 %  Physical Exam  Vitals and nursing note reviewed.   Constitutional:       General: She is not in acute distress.     Appearance: She is not diaphoretic.   HENT:      Head: Atraumatic.      Mouth/Throat:      Pharynx: No oropharyngeal exudate.   Eyes:      General: No scleral icterus.     Pupils: Pupils are equal, round, and reactive to light.   Cardiovascular:      Heart sounds: Normal heart sounds.   Pulmonary:      Effort: No respiratory distress.      Breath sounds: " "Normal breath sounds.   Abdominal:      General: Bowel sounds are normal.      Palpations: Abdomen is soft.      Tenderness: There is no abdominal tenderness.   Musculoskeletal:         General: No tenderness.   Skin:     General: Skin is warm.      Findings: No rash.   Neurological:      General: No focal deficit present.      Mental Status: She is oriented to person, place, and time.   Psychiatric:         Attention and Perception: Attention normal.         Mood and Affect: Mood normal.         Speech: Speech normal.         Behavior: Behavior normal.         Thought Content: Thought content includes suicidal ideation. Thought content does not include suicidal (No specific plan but I could do anything\") plan.         Cognition and Memory: Cognition is impaired (Baseline intellectual disability).           ED Course, Procedures, & Data      Procedures         Mental Health Risk Assessment      PSS-3    Date and Time Over the past 2 weeks have you felt down, depressed, or hopeless? Over the past 2 weeks have you had thoughts of killing yourself? Have you ever attempted to kill yourself? When did this last happen? User   03/16/23 1136 yes yes yes within the last month (but not today) TM[facility]      C-SSRS ([place])    Date and Time Q1 Wished to be Dead (Past Month) Q2 Suicidal Thoughts (Past Month) Q3 Suicidal Thought Method Q4 Suicidal Intent without Specific Plan Q5 Suicide Intent with Specific Plan Q6 Suicide Behavior (Lifetime) Within the Past 3 Months? RETIRED: Level of Risk per Screen Screening Not Complete User   03/16/23 1136 yes yes yes no yes yes -- -- -- [facility]                Item Assessment   Suicidal Ideation Suicidal thoughts with method (no specific plan or intent to act)   Plan  \"I could do many things\"   Intent  none currently, but states \"some day I will do it\"   Suicidal or self-harm behaviors None   Risk Factors Previous behavior and diagnosis   Protective Factors Long history of self-injurious behavior " "and frequent ED presentation for same              No results found for any visits on 03/16/23.  Medications - No data to display  Labs Ordered and Resulted from Time of ED Arrival to Time of ED Departure - No data to display  No orders to display          Critical care was not performed.     Medical Decision Making  The patient's presentation was of moderate complexity (a chronic illness mild to moderate exacerbation, progression, or side effect of treatment).    The patient's evaluation involved:  history and exam without other MDM data elements    The patient's management necessitated high risk (a decision regarding hospitalization).      Assessment & Plan    22-year-old female was a history of borderline personality disorder, intellectual disability, frequent ED presentation due to reported suicidal thoughts, and self-injurious behavior.  She frequently becomes emotionally dysregulated, and also admittedly comes to the emergency department when she is bored.  She attempts to be admitted as she does not like her group home.  Care plan suggest that if there are no acute symptoms she be returned to her current outpatient setting.  Today she also has recently been diagnosed with COVID-19 and is having some myalgias and nausea.  She is in no respiratory distress, normal vital signs, unremarkable physical exam.  She declined Zofran and Tylenol which were offered while she was here.  She stated to me \"I will just call for my ride you guys are going to do shit anyways\".  Patient is now cooperative, more emotionally regulated, and appears to be back at baseline.  The patient does not appear to be an acute imminent risk to themself or others. Patient does have a higher than average risk for similar behaviors due to their past behaviors and diagnoses. This episode is unfortunately part of the baseline for this patient.  A short acute hospitalization will not likely mitigate the long term risk, and is not recommended, nor a " beneficial treatment for these behaviors.  We will plan to discharge back to the group home.    I have reviewed the nursing notes. I have reviewed the findings, diagnosis, plan and need for follow up with the patient.    New Prescriptions    No medications on file       Final diagnoses:   Infection due to 2019 novel coronavirus   Borderline personality disorder (H)   Intellectual disability       Leo Lowe MD  [facility] EMERGENCY DEPARTMENT  3/16/2023     Leo Lowe MD  03/16/23 8091

## 2023-03-16 NOTE — ED TRIAGE NOTES
"Patient presents due to suicidal thoughts. When asked about back pain and COVID; Patient reports not being here for that. Patient reports being here for \"my usual.\" Patient reports wanting to go home and kill herself.      Triage Assessment     Row Name 03/16/23 1337       Triage Assessment (Adult)    Airway WDL WDL       Respiratory WDL    Respiratory WDL WDL       Skin Circulation/Temperature WDL    Skin Circulation/Temperature WDL WDL       Cardiac WDL    Cardiac WDL WDL       Peripheral/Neurovascular WDL    Peripheral Neurovascular WDL WDL       Cognitive/Neuro/Behavioral WDL    Cognitive/Neuro/Behavioral WDL WDL              "

## 2023-03-17 ENCOUNTER — HOSPITAL ENCOUNTER (EMERGENCY)
Facility: CLINIC | Age: 23
Discharge: HOME | End: 2023-03-17
Attending: EMERGENCY MEDICINE | Admitting: EMERGENCY MEDICINE
Payer: MEDICARE

## 2023-03-17 VITALS
TEMPERATURE: 97.3 F | OXYGEN SATURATION: 100 % | HEART RATE: 68 BPM | DIASTOLIC BLOOD PRESSURE: 62 MMHG | SYSTOLIC BLOOD PRESSURE: 106 MMHG | RESPIRATION RATE: 18 BRPM

## 2023-03-17 DIAGNOSIS — F60.3 BORDERLINE PERSONALITY DISORDER (H): ICD-10-CM

## 2023-03-17 PROCEDURE — 99284 EMERGENCY DEPT VISIT MOD MDM: CPT | Performed by: EMERGENCY MEDICINE

## 2023-03-17 PROCEDURE — 99285 EMERGENCY DEPT VISIT HI MDM: CPT | Performed by: EMERGENCY MEDICINE

## 2023-03-17 ASSESSMENT — ACTIVITIES OF DAILY LIVING (ADL)
ADLS_ACUITY_SCORE: 37
ADLS_ACUITY_SCORE: 37

## 2023-03-17 NOTE — ED PROVIDER NOTES
[place] EMERGENCY DEPARTMENT ([facility])  March 17, 2023  History     Chief Complaint   Patient presents with     Suicidal     Pt demanding to go home, refused any VS      HPI  Sadaf Ross is a 22-year-old female frequent flyer here (12x ED visits in the first 17 days of March) in the emergency department who has a history of bipolar disorder, intellectual disability, and borderline personality disorder who frequently visits the ER with suicidal ideation and is mostly sent home.  Patient denies any self injury at this time and states that she has not been having any problems with the COVID infection that she tested positive for 2 days ago.  Patient states she has been coughing little bit, but has no shortness of breath, no vomiting, and no diarrhea.  Patient presents here to the emergency room this morning with another conundrum and that she came in suicidal, but yet does not want to see our behavioral  and wants to go home right away.  Patient at this time will be seen by behavioral behavioral medicine and be evaluated.    This part of the medical record was transcribed by Sergio Sargent, Medical Scribe, from a dictation done by Ayo Romero MD.       Past Medical History  Past Medical History:   Diagnosis Date     ADHD (attention deficit hyperactivity disorder)      Bipolar 1 disorder (H)      Borderline personality disorder (H)      Depression      Depressive disorder      Intellectual disability      Obesity      Syncope      Past Surgical History:   Procedure Laterality Date     APPENDECTOMY       APPENDECTOMY       acetaminophen (TYLENOL) 325 MG tablet  alum & mag hydroxide-simethicone (MAALOX  ES) 400-400-40 MG/5ML SUSP suspension  ARIPiprazole lauroxil ER (ARISTADA) 882 MG/3.2ML intra-muscular  benzonatate (TESSALON) 200 MG capsule  benztropine (COGENTIN) 1 MG tablet  bisacodyl (DULCOLAX) 5 MG EC tablet  calcium carbonate (TUMS) 500 MG chewable tablet  clobetasol (TEMOVATE)  0.05 % CREA cream  cyclobenzaprine (FLEXERIL) 10 MG tablet  diclofenac (VOLTAREN) 1 % topical gel  docusate sodium (COLACE) 50 MG capsule  fluticasone (FLONASE) 50 MCG/ACT nasal spray  guaiFENesin (MUCINEX) 600 MG 12 hr tablet  hydrocortisone (CORTAID) 0.5 % external cream  hydrOXYzine (ATARAX) 50 MG tablet  hyoscyamine (LEVSIN/SL) 0.125 MG sublingual tablet  ibuprofen (ADVIL/MOTRIN) 400 MG tablet  loperamide (IMODIUM) 2 MG capsule  loratadine (CLARITIN) 10 MG tablet  LORazepam (ATIVAN) 0.5 MG tablet  multivitamin, therapeutic (THERA-VIT) TABS tablet  OLANZapine zydis (ZYPREXA) 5 MG ODT  omeprazole (PRILOSEC) 40 MG DR capsule  ondansetron (ZOFRAN) 4 MG tablet  ondansetron (ZOFRAN) 4 MG tablet  polyethylene glycol (MIRALAX) 17 GM/Dose powder  prochlorperazine (COMPAZINE) 10 MG tablet  promethazine (PHENERGAN) 12.5 MG tablet  sennosides (SENOKOT) 8.6 MG tablet  traZODone (DESYREL) 50 MG tablet  venlafaxine (EFFEXOR-ER) 225 MG 24 hr tablet  Vitamin D, Cholecalciferol, 25 MCG (1000 UT) TABS      Allergies   Allergen Reactions     Penicillins Rash and Unknown     Family History  Family History   Problem Relation Age of Onset     Diabetes Type 1 Father      Cancer Paternal Grandfather      Social History   Social History     Tobacco Use     Smoking status: Every Day     Packs/day: 1.00     Years: 5.00     Pack years: 5.00     Types: Vaping Device, Cigarettes     Smokeless tobacco: Never   Substance Use Topics     Alcohol use: No     Drug use: No      Past medical history, past surgical history, medications, allergies, family history, and social history were reviewed with the patient. No additional pertinent items.      A medically appropriate review of systems was performed with pertinent positives and negatives noted in the HPI, and all other systems negative.    Physical Exam      Physical Exam  Constitutional:       Comments: Slightly agitated but answers questions and is refusing exam or vital signs or to talk to the  DEC    HENT:      Head: Atraumatic.   Eyes:      Extraocular Movements: Extraocular movements intact.      Pupils: Pupils are equal, round, and reactive to light.   Pulmonary:      Effort: No respiratory distress.      Comments: Good aeration bilaterally  Musculoskeletal:         General: No deformity.   Neurological:      General: No focal deficit present.      Mental Status: She is alert and oriented to person, place, and time.   Psychiatric:      Comments: Dogmatic           ED Course, Procedures, & Data      Procedures            Critical care was not performed.     Medical Decision Making  The patient's presentation was of moderate complexity (a chronic illness mild to moderate exacerbation, progression, or side effect of treatment).    The patient's evaluation involved:  discussion of management or test interpretation with another health professional (Behavioral )    The patient's management necessitated moderate risk (limitations due to social determinants of health (Complex psychosocial situation)).      Assessment & Plan      Patient initially was to see our DEC  but has a care plan in place and apparently the  called and stated there was no reason for them to assess the patient and that if the patient is medically clear the patient can be sent back to her group home.      I have reviewed the nursing notes. I have reviewed the findings, diagnosis, plan and need for follow up with the patient.        Final diagnoses:   Borderline personality disorder (H)     May go back to group home.    Please make an appointment to follow up with Your Primary Care Provider in 1 week for recheck    ISergio, am serving as a trained medical scribe to document services personally performed by Ayo Romero MD, based on the provider's statements to me.     IAyo MD, was physically present and have reviewed and verified the accuracy of this note  documented by Sergio Sargent.      Ayo Romero MD  [facility] EMERGENCY DEPARTMENT  3/17/2023     Ayo Romero MD  03/17/23 [id number]

## 2023-03-17 NOTE — DISCHARGE INSTRUCTIONS
May go back to group home.    Please make an appointment to follow up with Your Primary Care Provider in 1 week for recheck

## 2023-03-17 NOTE — ED NOTES
Pt evaluated by MD and determine appropriate for discharge. Pt verbalized understanding of discontinue instructions. She appears in stable condition. All belongings returned. Pt left back to [facility] via ambulance.

## 2023-03-17 NOTE — ED PROVIDER NOTES
"ED Provider Note  [facility]      History     Chief Complaint   Patient presents with     Suicidal     Patient came in by cab today, stating she is here for the \"same stuff\", suicidal, plan to head bang.      HPI  Sadaf Ross is a 22 year old female who presents emergency room after hearing what she thought was upsetting news about her sister having a baby.  Patient felt like she was once again feeling responsible for trauma in her family and became upset and felt as though she was going to injure herself so she took a cab to the emergency room from her group home.  Patient continues to feel as though she may harm herself and will be on a one-to-one here in the emergency room.    Past Medical History  Past Medical History:   Diagnosis Date     ADHD (attention deficit hyperactivity disorder)      Bipolar 1 disorder (H)      Borderline personality disorder (H)      Depression      Depressive disorder      Intellectual disability      Obesity      Syncope      Past Surgical History:   Procedure Laterality Date     APPENDECTOMY       APPENDECTOMY       acetaminophen (TYLENOL) 325 MG tablet  alum & mag hydroxide-simethicone (MAALOX  ES) 400-400-40 MG/5ML SUSP suspension  ARIPiprazole lauroxil ER (ARISTADA) 882 MG/3.2ML intra-muscular  benzonatate (TESSALON) 200 MG capsule  benztropine (COGENTIN) 1 MG tablet  bisacodyl (DULCOLAX) 5 MG EC tablet  calcium carbonate (TUMS) 500 MG chewable tablet  clobetasol (TEMOVATE) 0.05 % CREA cream  cyclobenzaprine (FLEXERIL) 10 MG tablet  diclofenac (VOLTAREN) 1 % topical gel  docusate sodium (COLACE) 50 MG capsule  fluticasone (FLONASE) 50 MCG/ACT nasal spray  guaiFENesin (MUCINEX) 600 MG 12 hr tablet  hydrocortisone (CORTAID) 0.5 % external cream  hydrOXYzine (ATARAX) 50 MG tablet  hyoscyamine (LEVSIN/SL) 0.125 MG sublingual tablet  ibuprofen (ADVIL/MOTRIN) 400 MG tablet  loperamide (IMODIUM) 2 MG capsule  loratadine (CLARITIN) 10 MG tablet  LORazepam " "(ATIVAN) 0.5 MG tablet  multivitamin, therapeutic (THERA-VIT) TABS tablet  OLANZapine zydis (ZYPREXA) 5 MG ODT  omeprazole (PRILOSEC) 40 MG DR capsule  ondansetron (ZOFRAN) 4 MG tablet  ondansetron (ZOFRAN) 4 MG tablet  polyethylene glycol (MIRALAX) 17 GM/Dose powder  prochlorperazine (COMPAZINE) 10 MG tablet  promethazine (PHENERGAN) 12.5 MG tablet  sennosides (SENOKOT) 8.6 MG tablet  traZODone (DESYREL) 50 MG tablet  venlafaxine (EFFEXOR-ER) 225 MG 24 hr tablet  Vitamin D, Cholecalciferol, 25 MCG (1000 UT) TABS      Allergies   Allergen Reactions     Penicillins Rash and Unknown     Family History  Family History   Problem Relation Age of Onset     Diabetes Type 1 Father      Cancer Paternal Grandfather      Social History   Social History     Tobacco Use     Smoking status: Every Day     Packs/day: 1.00     Years: 5.00     Pack years: 5.00     Types: Vaping Device, Cigarettes     Smokeless tobacco: Never   Substance Use Topics     Alcohol use: No     Drug use: No         A medically appropriate review of systems was performed with pertinent positives and negatives noted in the HPI, and all other systems negative.    Physical Exam   BP: 119/86  Pulse: 101  Temp: 98.7  F (37.1  C)  Resp: 18  Height: 162.6 cm (5' 4\")  Weight: 110 kg (242 lb 6.4 oz)  SpO2: 99 %  Physical Exam  Constitutional:       General: She is not in acute distress.     Appearance: She is not diaphoretic.   HENT:      Head: Atraumatic.      Mouth/Throat:      Pharynx: No oropharyngeal exudate.   Eyes:      General: No scleral icterus.     Pupils: Pupils are equal, round, and reactive to light.   Cardiovascular:      Heart sounds: Normal heart sounds.   Pulmonary:      Effort: No respiratory distress.      Breath sounds: Normal breath sounds.   Abdominal:      General: Bowel sounds are normal.      Palpations: Abdomen is soft.      Tenderness: There is no abdominal tenderness.   Musculoskeletal:         General: No tenderness.   Skin:     General: " Skin is warm.      Findings: No rash.   Neurological:      Mental Status: She is oriented to person, place, and time.      Sensory: No sensory deficit.      Motor: No weakness.      Coordination: Coordination normal.   Psychiatric:         Mood and Affect: Mood is anxious.         Speech: Speech normal.         Behavior: Behavior is cooperative.         Thought Content: Thought content includes suicidal ideation. Thought content does not include suicidal plan.         Judgment: Judgment is impulsive.         ED Course, Procedures, & Data      Procedures              Medications - No data to display  Labs Ordered and Resulted from Time of ED Arrival to Time of ED Departure - No data to display  No orders to display          Critical care was not performed.     Medical Decision Making  The patient's presentation was of moderate complexity (a chronic illness mild to moderate exacerbation, progression, or side effect of treatment).    The patient's evaluation involved:  history and exam without other MDM data elements    The patient's management always include some level of risk of self injury or suicide we needed to assess this and determine whether not she needed to be hospitalized at this time patient appears to be at baseline      Assessment & Plan        I have reviewed the nursing notes. I have reviewed the findings, diagnosis, plan and need for follow up with the patient.    Patient remaining calm here in the emergency room will be transported back to group home after observation in the emergency room.    Final diagnoses:   Borderline personality disorder (H)   Intellectual disability   Depression, unspecified depression type         [facility] EMERGENCY DEPARTMENT  3/6/2023     Robert Olea MD  03/17/23 [id number]

## 2023-03-17 NOTE — ED NOTES
"Pt cooperative with search with encouragement. She appears in stable condition. She states \"I dont belong here.\" Pt oriented to ED evaluation process.   "

## 2023-03-22 ENCOUNTER — APPOINTMENT (OUTPATIENT)
Dept: GENERAL RADIOLOGY | Facility: CLINIC | Age: 23
End: 2023-03-22
Attending: FAMILY MEDICINE
Payer: MEDICARE

## 2023-03-22 ENCOUNTER — HOSPITAL ENCOUNTER (EMERGENCY)
Facility: CLINIC | Age: 23
Discharge: HOME | End: 2023-03-22
Attending: FAMILY MEDICINE | Admitting: FAMILY MEDICINE
Payer: MEDICARE

## 2023-03-22 VITALS
HEART RATE: 95 BPM | TEMPERATURE: 98.3 F | WEIGHT: 238 LBS | BODY MASS INDEX: 40.85 KG/M2 | RESPIRATION RATE: 16 BRPM | DIASTOLIC BLOOD PRESSURE: 79 MMHG | SYSTOLIC BLOOD PRESSURE: 120 MMHG | OXYGEN SATURATION: 98 %

## 2023-03-22 DIAGNOSIS — M54.50 ACUTE RIGHT-SIDED LOW BACK PAIN WITHOUT SCIATICA: ICD-10-CM

## 2023-03-22 DIAGNOSIS — F79 INTELLECTUAL DISABILITY: Chronic | ICD-10-CM

## 2023-03-22 DIAGNOSIS — F60.3 BORDERLINE PERSONALITY DISORDER (H): Chronic | ICD-10-CM

## 2023-03-22 LAB — SARS-COV-2 RNA RESP QL NAA+PROBE: POSITIVE

## 2023-03-22 PROCEDURE — 99284 EMERGENCY DEPT VISIT MOD MDM: CPT | Mod: CS | Performed by: FAMILY MEDICINE

## 2023-03-22 PROCEDURE — 96372 THER/PROPH/DIAG INJ SC/IM: CPT | Performed by: FAMILY MEDICINE

## 2023-03-22 PROCEDURE — C9803 HOPD COVID-19 SPEC COLLECT: HCPCS | Performed by: FAMILY MEDICINE

## 2023-03-22 PROCEDURE — 72100 X-RAY EXAM L-S SPINE 2/3 VWS: CPT

## 2023-03-22 PROCEDURE — U0003 INFECTIOUS AGENT DETECTION BY NUCLEIC ACID (DNA OR RNA); SEVERE ACUTE RESPIRATORY SYNDROME CORONAVIRUS 2 (SARS-COV-2) (CORONAVIRUS DISEASE [COVID-19]), AMPLIFIED PROBE TECHNIQUE, MAKING USE OF HIGH THROUGHPUT TECHNOLOGIES AS DESCRIBED BY CMS-2020-01-R: HCPCS | Performed by: FAMILY MEDICINE

## 2023-03-22 PROCEDURE — 99285 EMERGENCY DEPT VISIT HI MDM: CPT | Mod: CS,25 | Performed by: FAMILY MEDICINE

## 2023-03-22 PROCEDURE — 250N000011 HC RX IP 250 OP 636: Performed by: FAMILY MEDICINE

## 2023-03-22 RX ORDER — KETOROLAC TROMETHAMINE 15 MG/ML
15 INJECTION, SOLUTION INTRAMUSCULAR; INTRAVENOUS ONCE
Status: COMPLETED | OUTPATIENT
Start: 2023-03-22 | End: 2023-03-22

## 2023-03-22 RX ORDER — ALBUTEROL SULFATE 90 UG/1
2 AEROSOL, METERED RESPIRATORY (INHALATION) EVERY 6 HOURS PRN
Qty: 18 G | Refills: 0 | Status: SHIPPED | OUTPATIENT
Start: 2023-03-22 | End: 2024-05-19

## 2023-03-22 RX ADMIN — KETOROLAC TROMETHAMINE 15 MG: 15 INJECTION, SOLUTION INTRAMUSCULAR; INTRAVENOUS at 18:50

## 2023-03-22 ASSESSMENT — ACTIVITIES OF DAILY LIVING (ADL): ADLS_ACUITY_SCORE: 35

## 2023-03-22 NOTE — ED TRIAGE NOTES
Pt presents to the ED for concerns of lower back pain for the past 2-3 days. Pt states she has tried hot showers and ibuprofen for it with no relief. Pt is also having suicidal ideations, thoughts only, having her chronic ideations but no plan.     Triage Assessment     Row Name 03/22/23 3988       Triage Assessment (Adult)    Airway WDL WDL       Respiratory WDL    Respiratory WDL WDL       Skin Circulation/Temperature WDL    Skin Circulation/Temperature WDL WDL       Cardiac WDL    Cardiac WDL WDL       Peripheral/Neurovascular WDL    Peripheral Neurovascular WDL WDL       Cognitive/Neuro/Behavioral WDL    Cognitive/Neuro/Behavioral WDL WDL

## 2023-03-23 NOTE — ED PROVIDER NOTES
[facility] - [place], [place] EMERGENCY DEPARTMENT ([facility] [place])    3/22/23      ED PROVIDER NOTE  Hall[facility]   History     Chief Complaint   Patient presents with     Back Pain     2-3 days ongoing, lower back     Suicidal     Chronic suicidal feeling, thoughts only     HPI  Sadaf Ross is a 22 year old female with history of bipolar disorder, intellectual disability, and borderline personality disorder well-known to the emergency department for multiple visits who presents to the emergency department reporting chronic suicidal ideation as well as lower back pain for the past 2-3 days.  She had just presented to the emergency department 5 days ago and was seen by Dr. Ayo Romero and then requested discharge.  She returns the ER today reporting low back pain for the past 2-3 days as well as her usual chronic suicidal ideation without intent or plan. She did test positive for COVID 7 days ago.    Past Medical History  Past Medical History:   Diagnosis Date     ADHD (attention deficit hyperactivity disorder)      Bipolar 1 disorder (H)      Borderline personality disorder (H)      Depression      Depressive disorder      Intellectual disability      Obesity      Syncope      Past Surgical History:   Procedure Laterality Date     APPENDECTOMY       APPENDECTOMY       albuterol (PROAIR HFA/PROVENTIL HFA/VENTOLIN HFA) 108 (90 Base) MCG/ACT inhaler  acetaminophen (TYLENOL) 325 MG tablet  alum & mag hydroxide-simethicone (MAALOX  ES) 400-400-40 MG/5ML SUSP suspension  ARIPiprazole lauroxil ER (ARISTADA) 882 MG/3.2ML intra-muscular  benzonatate (TESSALON) 200 MG capsule  benztropine (COGENTIN) 1 MG tablet  bisacodyl (DULCOLAX) 5 MG EC tablet  calcium carbonate (TUMS) 500 MG chewable tablet  clobetasol (TEMOVATE) 0.05 % CREA cream  cyclobenzaprine (FLEXERIL) 10 MG tablet  diclofenac (VOLTAREN) 1 % topical gel  docusate sodium (COLACE) 50 MG capsule  fluticasone (FLONASE) 50 MCG/ACT nasal spray  guaiFENesin (MUCINEX) 600 MG 12  hr tablet  hydrocortisone (CORTAID) 0.5 % external cream  hydrOXYzine (ATARAX) 50 MG tablet  hyoscyamine (LEVSIN/SL) 0.125 MG sublingual tablet  ibuprofen (ADVIL/MOTRIN) 400 MG tablet  loperamide (IMODIUM) 2 MG capsule  loratadine (CLARITIN) 10 MG tablet  LORazepam (ATIVAN) 0.5 MG tablet  multivitamin, therapeutic (THERA-VIT) TABS tablet  OLANZapine zydis (ZYPREXA) 5 MG ODT  omeprazole (PRILOSEC) 40 MG DR capsule  ondansetron (ZOFRAN) 4 MG tablet  ondansetron (ZOFRAN) 4 MG tablet  polyethylene glycol (MIRALAX) 17 GM/Dose powder  prochlorperazine (COMPAZINE) 10 MG tablet  promethazine (PHENERGAN) 12.5 MG tablet  sennosides (SENOKOT) 8.6 MG tablet  traZODone (DESYREL) 50 MG tablet  venlafaxine (EFFEXOR-ER) 225 MG 24 hr tablet  Vitamin D, Cholecalciferol, 25 MCG (1000 UT) TABS      Allergies   Allergen Reactions     Penicillins Rash and Unknown     Family History  Family History   Problem Relation Age of Onset     Diabetes Type 1 Father      Cancer Paternal Grandfather      Social History   Social History     Tobacco Use     Smoking status: Every Day     Packs/day: 1.00     Years: 5.00     Pack years: 5.00     Types: Vaping Device, Cigarettes     Smokeless tobacco: Never   Substance Use Topics     Alcohol use: No     Drug use: No         A medically appropriate review of systems was performed with pertinent positives and negatives noted in the HPI, and all other systems negative.    Physical Exam   BP: 120/79  Pulse: 95  Temp: 98.3  F (36.8  C)  Resp: 16  Weight: 108 kg (238 lb)  SpO2: 98 %  Physical Exam  Constitutional:       General: She is not in acute distress.     Appearance: She is not diaphoretic.   HENT:      Head: Atraumatic.      Mouth/Throat:      Pharynx: No oropharyngeal exudate.   Eyes:      General: No scleral icterus.     Pupils: Pupils are equal, round, and reactive to light.   Cardiovascular:      Heart sounds: Normal heart sounds.   Pulmonary:      Effort: No respiratory distress.      Breath  sounds: Normal breath sounds.   Abdominal:      General: Bowel sounds are normal.      Palpations: Abdomen is soft.      Tenderness: There is no abdominal tenderness.   Musculoskeletal:         General: No tenderness.   Skin:     General: Skin is warm.      Findings: No rash.   Neurological:      General: No focal deficit present.      Mental Status: She is oriented to person, place, and time.      Sensory: No sensory deficit.      Motor: No weakness.      Coordination: Coordination normal.   Psychiatric:         Mood and Affect: Mood is anxious.         Speech: Speech normal.         Behavior: Behavior is cooperative.         Thought Content: Thought content does not include homicidal or suicidal ideation.           ED Course, Procedures, & Data      Procedures       Results for orders placed or performed during the hospital encounter of 03/22/23   XR Lumbar Spine 2/3 Views     Status: None    Narrative    EXAM: XR LUMBAR SPINE 2/3 VIEWS  LOCATION: [facility]  DATE/TIME: 3/22/2023 7:38 PM    INDICATION: Back pain  COMPARISON: None.      Impression    IMPRESSION: 5 lumbar vertebrae. Mild lumbar levocurvature measuring 9 degrees from L1 through L5. Normal lumbar lordosis. No listhesis. No displaced fracture. Vertebral body heights are maintained. No aggressive osseous abnormality. No acute extraspinal   abnormality.   Asymptomatic COVID-19 Virus (Coronavirus) by PCR Nose     Status: Abnormal    Specimen: Nose; Swab   Result Value Ref Range    SARS CoV2 PCR Positive (A) Negative    Narrative    Testing was performed using the Xpert Xpress SARS-CoV-2 Assay on the Cepheid Gene-Xpert Instrument Systems. Additional information about this Emergency Use Authorization (EUA) assay can be found via the Lab Guide. This test should be ordered for the detection of SARS-CoV-2 in individuals who meet SARS-CoV-2 clinical and/or epidemiological criteria as well as from individuals without  symptoms or other reasons to suspect COVID-19. Test performance for asymptomatic patients has only been established in anterior nasal swab specimens. This test is for in vitro diagnostic use under the FDA EUA for laboratories certified under CLIA to perform high complexity testing. This test has not been FDA cleared or approved. A negative result does not rule out the presence of PCR inhibitors in the specimen or target RNA concentration below the limit of detection for the assay. The possibility of a false negative should be considered if the patient's recent exposure or clinical presentation suggests COVID-19. This test was validated by the [facility]. This laboratory is certified under the Clinical Laboratory Improvement Amendments (CLIA) as qualified to perform high complexity laboratory testing.       Medications   ketorolac (TORADOL) injection 15 mg (15 mg Intramuscular $Given 3/22/23 1850)     Labs Ordered and Resulted from Time of ED Arrival to Time of ED Departure   COVID-19 VIRUS (CORONAVIRUS) BY PCR - Abnormal       Result Value    SARS CoV2 PCR Positive (*)      Results for orders placed or performed during the hospital encounter of 03/22/23   XR Lumbar Spine 2/3 Views     Status: None    Narrative    EXAM: XR LUMBAR SPINE 2/3 VIEWS  LOCATION: [facility]  DATE/TIME: 3/22/2023 7:38 PM    INDICATION: Back pain  COMPARISON: None.      Impression    IMPRESSION: 5 lumbar vertebrae. Mild lumbar levocurvature measuring 9 degrees from L1 through L5. Normal lumbar lordosis. No listhesis. No displaced fracture. Vertebral body heights are maintained. No aggressive osseous abnormality. No acute extraspinal   abnormality.   Asymptomatic COVID-19 Virus (Coronavirus) by PCR Nose     Status: Abnormal    Specimen: Nose; Swab   Result Value Ref Range    SARS CoV2 PCR Positive (A) Negative    Narrative    Testing was performed using the  Xpert Xpress SARS-CoV-2 Assay on the Cepheid Gene-Xpert Instrument Systems. Additional information about this Emergency Use Authorization (EUA) assay can be found via the Lab Guide. This test should be ordered for the detection of SARS-CoV-2 in individuals who meet SARS-CoV-2 clinical and/or epidemiological criteria as well as from individuals without symptoms or other reasons to suspect COVID-19. Test performance for asymptomatic patients has only been established in anterior nasal swab specimens. This test is for in vitro diagnostic use under the FDA EUA for laboratories certified under CLIA to perform high complexity testing. This test has not been FDA cleared or approved. A negative result does not rule out the presence of PCR inhibitors in the specimen or target RNA concentration below the limit of detection for the assay. The possibility of a false negative should be considered if the patient's recent exposure or clinical presentation suggests COVID-19. This test was validated by the [facility]. This laboratory is certified under the Clinical Laboratory Improvement Amendments (CLIA) as qualified to perform high complexity laboratory testing.              Critical care was not performed.     Medical Decision Making  The patient's presentation was of moderate complexity (2 or more stable chronic illnesses).    The patient's evaluation involved:  ordering and/or review of 2 test(s) in this encounter (see separate area of note for details)    The patient's management necessitated moderate risk (prescription drug management including medications given in the ED).      Assessment & Plan      I have reviewed the nursing notes. I have reviewed the findings, diagnosis, plan and need for follow up with the patient.    Discharge Medication List as of 3/22/2023  8:15 PM      START taking these medications    Details   albuterol (PROAIR HFA/PROVENTIL HFA/VENTOLIN HFA) 108 (90 Base)  MCG/ACT inhaler Inhale 2 puffs into the lungs every 6 hours as needed for shortness of breath, wheezing or cough, Disp-18 g, R-0, E-PrescribePharmacy may dispense brand covered by insurance (Proair, or proventil or ventolin or generic albuterol inhaler)             Final diagnoses:   Acute right-sided low back pain without sciatica   Intellectual disability   Borderline personality disorder (H)       Robert Olea MD  [facility] [place] EMERGENCY DEPARTMENT  3/22/2023     Robert Olea MD  03/24/23 1213

## 2023-03-23 NOTE — DISCHARGE INSTRUCTIONS
Discharge to home with plan to  inhaler at [facility] continue with current medications and current outpatient therapies May use ibuprofen 3 times a day for back pain.

## 2023-03-31 ENCOUNTER — TRANSCRIBE ORDERS (OUTPATIENT)
Dept: OTHER | Age: 23
End: 2023-03-31

## 2023-03-31 DIAGNOSIS — G89.29 CHRONIC LOW BACK PAIN WITHOUT SCIATICA: Primary | ICD-10-CM

## 2023-03-31 DIAGNOSIS — M54.50 CHRONIC LOW BACK PAIN WITHOUT SCIATICA: Primary | ICD-10-CM

## 2023-04-11 ENCOUNTER — HOSPITAL ENCOUNTER (EMERGENCY)
Facility: CLINIC | Age: 23
Discharge: HOME | End: 2023-04-11
Attending: EMERGENCY MEDICINE | Admitting: EMERGENCY MEDICINE
Payer: COMMERCIAL

## 2023-04-11 ENCOUNTER — LAB REQUISITION (OUTPATIENT)
Dept: LAB | Facility: CLINIC | Age: 23
End: 2023-04-11
Payer: MEDICARE

## 2023-04-11 VITALS
BODY MASS INDEX: 40.85 KG/M2 | HEART RATE: 101 BPM | RESPIRATION RATE: 16 BRPM | WEIGHT: 238 LBS | TEMPERATURE: 98.5 F | SYSTOLIC BLOOD PRESSURE: 136 MMHG | OXYGEN SATURATION: 99 % | DIASTOLIC BLOOD PRESSURE: 94 MMHG

## 2023-04-11 DIAGNOSIS — T74.21XA ADULT SEXUAL ABUSE, CONFIRMED, INITIAL ENCOUNTER: ICD-10-CM

## 2023-04-11 DIAGNOSIS — T74.21XA REPORTED SEXUAL ASSAULT OF ADULT: ICD-10-CM

## 2023-04-11 LAB
ALBUMIN UR-MCNC: 20 MG/DL
APPEARANCE UR: ABNORMAL
BACTERIA #/AREA URNS HPF: ABNORMAL /HPF
BILIRUB UR QL STRIP: NEGATIVE
COLOR UR AUTO: YELLOW
GLUCOSE UR STRIP-MCNC: NEGATIVE MG/DL
HCG UR QL: NEGATIVE
HGB UR QL STRIP: NEGATIVE
INTERNAL QC OK POCT: NORMAL
KETONES UR STRIP-MCNC: NEGATIVE MG/DL
LEUKOCYTE ESTERASE UR QL STRIP: ABNORMAL
MUCOUS THREADS #/AREA URNS LPF: PRESENT /LPF
NITRATE UR QL: NEGATIVE
PH UR STRIP: 6.5 [PH] (ref 5–7)
POCT KIT EXPIRATION DATE: NORMAL
POCT KIT LOT NUMBER: NORMAL
RBC URINE: 1 /HPF
SP GR UR STRIP: 1.03 (ref 1–1.03)
SQUAMOUS EPITHELIAL: 13 /HPF
TRANSITIONAL EPI: <1 /HPF
UROBILINOGEN UR STRIP-MCNC: NORMAL MG/DL
WBC URINE: 8 /HPF

## 2023-04-11 PROCEDURE — 86780 TREPONEMA PALLIDUM: CPT | Mod: ORL | Performed by: NURSE PRACTITIONER

## 2023-04-11 PROCEDURE — 81001 URINALYSIS AUTO W/SCOPE: CPT | Performed by: EMERGENCY MEDICINE

## 2023-04-11 PROCEDURE — 81025 URINE PREGNANCY TEST: CPT | Performed by: EMERGENCY MEDICINE

## 2023-04-11 PROCEDURE — 87491 CHLMYD TRACH DNA AMP PROBE: CPT | Mod: ORL | Performed by: NURSE PRACTITIONER

## 2023-04-11 PROCEDURE — 250N000013 HC RX MED GY IP 250 OP 250 PS 637: Performed by: EMERGENCY MEDICINE

## 2023-04-11 PROCEDURE — 99284 EMERGENCY DEPT VISIT MOD MDM: CPT | Performed by: EMERGENCY MEDICINE

## 2023-04-11 PROCEDURE — 87086 URINE CULTURE/COLONY COUNT: CPT | Performed by: EMERGENCY MEDICINE

## 2023-04-11 PROCEDURE — 87591 N.GONORRHOEAE DNA AMP PROB: CPT | Mod: ORL | Performed by: NURSE PRACTITIONER

## 2023-04-11 RX ADMIN — ULIPRISTAL ACETATE 30 MG: 30 TABLET ORAL at 23:08

## 2023-04-11 ASSESSMENT — ACTIVITIES OF DAILY LIVING (ADL)
ADLS_ACUITY_SCORE: 37

## 2023-04-11 NOTE — ED TRIAGE NOTES
Triage Assessment     Row Name 04/11/23 3908       Triage Assessment (Adult)    Airway WDL WDL       Respiratory WDL    Respiratory WDL WDL       Skin Circulation/Temperature WDL    Skin Circulation/Temperature WDL WDL       Cardiac WDL    Cardiac WDL WDL       Peripheral/Neurovascular WDL    Peripheral Neurovascular WDL WDL       Cognitive/Neuro/Behavioral WDL    Cognitive/Neuro/Behavioral WDL WDL
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
## 2023-04-12 LAB
C TRACH DNA SPEC QL NAA+PROBE: NEGATIVE
N GONORRHOEA DNA SPEC QL NAA+PROBE: NEGATIVE
T PALLIDUM AB SER QL: NONREACTIVE
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
## 2023-04-13 LAB — BACTERIA UR CULT: NORMAL

## 2023-04-15 ENCOUNTER — NURSE TRIAGE (OUTPATIENT)
Dept: NURSING | Facility: CLINIC | Age: 23
End: 2023-04-15
Payer: MEDICARE

## 2023-04-15 ENCOUNTER — TELEPHONE (OUTPATIENT)
Dept: NURSING | Facility: CLINIC | Age: 23
End: 2023-04-15
Payer: MEDICARE
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
## 2023-04-16 ENCOUNTER — APPOINTMENT (OUTPATIENT)
Dept: GENERAL RADIOLOGY | Facility: CLINIC | Age: 23
End: 2023-04-16
Attending: FAMILY MEDICINE
Payer: MEDICARE

## 2023-04-16 ENCOUNTER — HOSPITAL ENCOUNTER (EMERGENCY)
Facility: CLINIC | Age: 23
Discharge: HOME | End: 2023-04-16
Attending: FAMILY MEDICINE | Admitting: FAMILY MEDICINE
Payer: MEDICARE

## 2023-04-16 VITALS
SYSTOLIC BLOOD PRESSURE: 118 MMHG | DIASTOLIC BLOOD PRESSURE: 79 MMHG | OXYGEN SATURATION: 99 % | RESPIRATION RATE: 18 BRPM | HEART RATE: 98 BPM | TEMPERATURE: 98.3 F

## 2023-04-16 DIAGNOSIS — M54.50 ACUTE MIDLINE LOW BACK PAIN WITHOUT SCIATICA: ICD-10-CM
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
## 2023-04-16 PROCEDURE — 73030 X-RAY EXAM OF SHOULDER: CPT | Mod: RT

## 2023-04-16 PROCEDURE — 250N000011 HC RX IP 250 OP 636: Performed by: FAMILY MEDICINE

## 2023-04-16 PROCEDURE — 71046 X-RAY EXAM CHEST 2 VIEWS: CPT

## 2023-04-16 PROCEDURE — 99284 EMERGENCY DEPT VISIT MOD MDM: CPT | Performed by: FAMILY MEDICINE

## 2023-04-16 PROCEDURE — 96372 THER/PROPH/DIAG INJ SC/IM: CPT | Performed by: FAMILY MEDICINE

## 2023-04-16 PROCEDURE — 72100 X-RAY EXAM L-S SPINE 2/3 VWS: CPT

## 2023-04-16 PROCEDURE — 99285 EMERGENCY DEPT VISIT HI MDM: CPT | Mod: 25 | Performed by: FAMILY MEDICINE

## 2023-04-16 RX ORDER — KETOROLAC TROMETHAMINE 15 MG/ML
15 INJECTION, SOLUTION INTRAMUSCULAR; INTRAVENOUS ONCE
Status: COMPLETED | OUTPATIENT
Start: 2023-04-16 | End: 2023-04-16

## 2023-04-16 RX ORDER — ONDANSETRON 4 MG/1
4 TABLET, ORALLY DISINTEGRATING ORAL ONCE
Status: COMPLETED | OUTPATIENT
Start: 2023-04-16 | End: 2023-04-16

## 2023-04-16 RX ADMIN — KETOROLAC TROMETHAMINE 15 MG: 15 INJECTION, SOLUTION INTRAMUSCULAR; INTRAVENOUS at 19:31

## 2023-04-16 RX ADMIN — ONDANSETRON 4 MG: 4 TABLET, ORALLY DISINTEGRATING ORAL at 20:17

## 2023-04-16 ASSESSMENT — ACTIVITIES OF DAILY LIVING (ADL): ADLS_ACUITY_SCORE: 37
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
## 2023-04-17 NOTE — DISCHARGE INSTRUCTIONS
Discharge back to group home May use current anti-inflammatory and muscle relaxant prescriptions as discussed and follow-up with your primary MD as needed.
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
## 2023-04-18 ENCOUNTER — NURSE TRIAGE (OUTPATIENT)
Dept: NURSING | Facility: CLINIC | Age: 23
End: 2023-04-18
Payer: MEDICARE

## 2023-04-18 ENCOUNTER — HOSPITAL ENCOUNTER (EMERGENCY)
Facility: CLINIC | Age: 23
Discharge: HOME | End: 2023-04-18
Attending: FAMILY MEDICINE | Admitting: FAMILY MEDICINE
Payer: MEDICARE

## 2023-04-18 VITALS
RESPIRATION RATE: 18 BRPM | HEIGHT: 64 IN | BODY MASS INDEX: 40.63 KG/M2 | DIASTOLIC BLOOD PRESSURE: 80 MMHG | OXYGEN SATURATION: 99 % | WEIGHT: 238 LBS | TEMPERATURE: 98.1 F | HEART RATE: 114 BPM | SYSTOLIC BLOOD PRESSURE: 144 MMHG

## 2023-04-18 DIAGNOSIS — F60.3 BORDERLINE PERSONALITY DISORDER (H): Chronic | ICD-10-CM

## 2023-04-18 DIAGNOSIS — F79 INTELLECTUAL DISABILITY: Chronic | ICD-10-CM

## 2023-04-18 PROCEDURE — 99284 EMERGENCY DEPT VISIT MOD MDM: CPT | Performed by: FAMILY MEDICINE

## 2023-04-18 PROCEDURE — 99285 EMERGENCY DEPT VISIT HI MDM: CPT | Performed by: FAMILY MEDICINE

## 2023-04-18 NOTE — DISCHARGE INSTRUCTIONS
Discharge back to the group home with plans to stop using social media for at least 2 weeks maintain supportive environment in the group home.
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
## 2023-04-20 NOTE — ED NOTES
I have performed an in person assessment of the patient. Based on this assessment the patient no longer requires a one on one attendant at this point in time.    Ramo Sawant,   1:52 PM  November 18, 2019         Ramo Sawant,   11/18/19 1983     The patient has been examined and the H&P has been reviewed:    I concur with the findings and no changes have occurred since H&P was written.    Surgery risks, benefits and alternative options discussed and understood by patient/family.          There are no hospital problems to display for this patient.
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
## 2023-04-30 ENCOUNTER — NURSE TRIAGE (OUTPATIENT)
Dept: NURSING | Facility: CLINIC | Age: 23
End: 2023-04-30
Payer: MEDICARE

## 2023-04-30 NOTE — TELEPHONE ENCOUNTER
Patient was seen in  twice due to dizziness and lightheadedness. Right side arm pit down to hip pain and stomach pain also.    UC told her to drink lots of water.  Patient states she cannot keep water down and will throw up.  Patient states when she goes to the bathroom it burns, that just started today.  She states UC did a urine on her and it was fine.    Patient states when she trys to walk she feels like she is going to fall over or pass out.    Patient denies breathing difficulty, is able to swallow, some confusion, no weakness or numbness on one side, no speech issues, has not taken any extra medications.    Care advise: go to ED    Vanda Borjas RN   04/30/23 5:03 PM  [facility] Nurse Advisor    Reason for Disposition    SEVERE dizziness (e.g., unable to stand, requires support to walk, feels like passing out now)    Additional Information    Negative: Shock suspected (e.g., cold/pale/clammy skin, too weak to stand, low BP, rapid pulse)    Negative: Sounds like a life-threatening emergency to the triager    Negative: Followed a female genital area injury (e.g., vagina, vulva)    Negative: Followed a male genital area injury (e.g., penis, scrotum)    Negative: Vaginal discharge    Negative: Pus (white, yellow) or bloody discharge from end of penis    Negative: [1] Taking antibiotic for urinary tract infection (UTI) AND [2] female    Negative: [1] Taking antibiotic for urinary tract infection (UTI) AND [2] male    Negative: [1] Pain or burning with passing urine (urination) AND [2] pregnant    Negative: [1] Pain or burning with passing urine (urination) AND [2] postpartum (from 0 to 6 weeks after delivery)    Negative: [1] Pain or burning with passing urine (urination) AND [2] female    Negative: [1] Pain or burning with passing urine (urination) AND [2] male    Negative: Pain or itching in the vulvar area    Negative: Pain in scrotum is main symptom    Negative: Blood in the urine is main  "symptom    Negative: Symptoms arising from use of a urinary catheter (e.g., coude, Prince)    Negative: [1] Unable to urinate (or only a few drops) > 4 hours AND [2] bladder feels very full (e.g., palpable bladder or strong urge to urinate)    Negative: [1] Decreased urination and [2] drinking very little AND [2] dehydration suspected (e.g., dark urine, no urine > 12 hours, very dry mouth, very lightheaded)    Negative: Patient sounds very sick or weak to the triager    Negative: Fever > 100.4 F (38.0 C)    Negative: SEVERE difficulty breathing (e.g., struggling for each breath, speaks in single words)    Negative: [1] Difficulty breathing or swallowing AND [2] started suddenly after medicine, an allergic food or bee sting    Negative: Shock suspected (e.g., cold/pale/clammy skin, too weak to stand, low BP, rapid pulse)    Negative: Difficult to awaken or acting confused (e.g., disoriented, slurred speech)    Negative: [1] Weakness (i.e., paralysis, loss of muscle strength) of the face, arm or leg on one side of the body AND [2] sudden onset AND [3] present now    Negative: [1] Numbness (i.e., loss of sensation) of the face, arm or leg on one side of the body AND [2] sudden onset AND [3] present now    Negative: [1] Loss of speech or garbled speech AND [2] sudden onset AND [3] present now    Negative: Overdose (accidental or intentional) of medications    Negative: [1] Fainted > 15 minutes ago AND [2] still feels too weak or dizzy to stand    Negative: Heart beating < 50 beats per minute OR > 140 beats per minute    Negative: Sounds like a life-threatening emergency to the triager    Negative: Chest pain    Negative: Rectal bleeding, bloody stool, or tarry-black stool    Negative: [1] Vomiting AND [2] contains red blood or black (\"coffee ground\") material    Negative: Vomiting is main symptom    Negative: Diarrhea is main symptom    Negative: Headache is main symptom    Negative: Patient states that they are having " an anxiety or panic attack    Negative: Dizziness from low blood sugar (i.e., < 60 mg/dl or 3.5 mmol/l)    Negative: Dizziness is described as a spinning sensation (i.e., vertigo)    Negative: Heat exhaustion suspected (i.e., dehydration from heat exposure)    Negative: Difficulty breathing    Protocols used: DIZZINESS - [id number], URINARY SYMPTOMS-A-AH

## 2023-05-03 ENCOUNTER — HOSPITAL ENCOUNTER (EMERGENCY)
Facility: CLINIC | Age: 23
Discharge: NURSING, REHAB OR LONG-TERM CARE | End: 2023-05-03
Attending: FAMILY MEDICINE | Admitting: FAMILY MEDICINE
Payer: MEDICARE

## 2023-05-03 ENCOUNTER — NURSE TRIAGE (OUTPATIENT)
Dept: NURSING | Facility: CLINIC | Age: 23
End: 2023-05-03
Payer: MEDICARE

## 2023-05-03 VITALS
DIASTOLIC BLOOD PRESSURE: 76 MMHG | SYSTOLIC BLOOD PRESSURE: 146 MMHG | TEMPERATURE: 99.1 F | OXYGEN SATURATION: 98 % | HEART RATE: 116 BPM | BODY MASS INDEX: 41.15 KG/M2 | WEIGHT: 241 LBS | HEIGHT: 64 IN | RESPIRATION RATE: 18 BRPM

## 2023-05-03 DIAGNOSIS — R45.851 PASSIVE SUICIDAL IDEATIONS: ICD-10-CM

## 2023-05-03 DIAGNOSIS — F79 INTELLECTUAL DISABILITY: Chronic | ICD-10-CM

## 2023-05-03 DIAGNOSIS — F60.3 BORDERLINE PERSONALITY DISORDER (H): Chronic | ICD-10-CM

## 2023-05-03 PROCEDURE — 99285 EMERGENCY DEPT VISIT HI MDM: CPT | Mod: 25 | Performed by: FAMILY MEDICINE

## 2023-05-03 PROCEDURE — 99284 EMERGENCY DEPT VISIT MOD MDM: CPT | Performed by: FAMILY MEDICINE

## 2023-05-03 PROCEDURE — 90791 PSYCH DIAGNOSTIC EVALUATION: CPT

## 2023-05-03 ASSESSMENT — COLUMBIA-SUICIDE SEVERITY RATING SCALE - C-SSRS
1. SINCE LAST CONTACT, HAVE YOU WISHED YOU WERE DEAD OR WISHED YOU COULD GO TO SLEEP AND NOT WAKE UP?: YES
2. HAVE YOU ACTUALLY HAD ANY THOUGHTS OF KILLING YOURSELF?: YES
2. HAVE YOU ACTUALLY HAD ANY THOUGHTS OF KILLING YOURSELF?: YES
SUICIDE, SINCE LAST CONTACT: NO
5. HAVE YOU STARTED TO WORK OUT OR WORKED OUT THE DETAILS OF HOW TO KILL YOURSELF? DO YOU INTEND TO CARRY OUT THIS PLAN?: NO
1. HAVE YOU WISHED YOU WERE DEAD OR WISHED YOU COULD GO TO SLEEP AND NOT WAKE UP?: YES
REASONS FOR IDEATION SINCE LAST CONTACT: MOSTLY TO GET ATTENTION, REVENGE, OR A REACTION FROM OTHERS
BASED ON RESPONSES TO C-SSRS QS 1-6, WHAT IS THE PATIENT'S OVERALL RISK RATING FOR SUICIDE: LOW RISK
1. IN THE PAST MONTH, HAVE YOU WISHED YOU WERE DEAD OR WISHED YOU COULD GO TO SLEEP AND NOT WAKE UP?: YES
TOTAL  NUMBER OF ABORTED OR SELF INTERRUPTED ATTEMPTS SINCE LAST CONTACT: NO
2. HAVE YOU ACTUALLY HAD ANY THOUGHTS OF KILLING YOURSELF?: YES
6. HAVE YOU EVER DONE ANYTHING, STARTED TO DO ANYTHING, OR PREPARED TO DO ANYTHING TO END YOUR LIFE?: NO
ATTEMPT SINCE LAST CONTACT: NO
TOTAL  NUMBER OF INTERRUPTED ATTEMPTS SINCE LAST CONTACT: NO

## 2023-05-03 ASSESSMENT — ACTIVITIES OF DAILY LIVING (ADL)
ADLS_ACUITY_SCORE: 37
ADLS_ACUITY_SCORE: 37

## 2023-05-03 NOTE — ED NOTES
"Pt has been waiting in the lobby for ambulance to arrived. Pt was looking for an RN and stated \" I feel suicidal\" Pt was brought back in the ED. A minute later ambulance arrived, Maria Alejandra NIEVES gave report to EMS. Dr Olea was updated that pt has been waiting in the lobby area waiting for the ambulance to arrive and now feels suicidal again. Dr Olea currently in the ED assessing pt. Pt has been deemed safe to be discharged back to the group home by MD after assessment  "

## 2023-05-03 NOTE — TELEPHONE ENCOUNTER
Triage call:    Patient calling, audibly upset.  She reports feeling confused & upset that she should be hospitalized for 30 days due to her mental health.    The patient reports having a team meeting today w/ her therapist, doctor, , care giver etc. & they recommended the hospitalization.  The patient reports leaving early due to their recommendation.      Of note, the patient reports living in a group home & denies having any current suicidal ideation.  At time of call, the patient reports she was w/ another person in the room.    Writer tried to deescalate situation & told patient to write down questions she may have for her care team about their recommendation as the patient did report feeling down lately.    Enid Mcdaniels RN on 5/3/2023 at 11:30 AM    Reason for Disposition    Difficult caller responded to phone counseling    Additional Information    Negative: Patient attempted suicide    Negative: Patient is threatening suicide now    Negative: Violent behavior, or threatening to physically hurt or kill someone    Negative: Patient is very confused (disoriented, slurred speech) and no other adult (e.g., friend or family member) available    Negative: Difficult to awaken or acting very confused (disoriented, slurred speech) and of new-onset    Negative: Sounds like a life-threatening emergency to the triager    Negative: [1] Caller mainly has complaints about past medical care, billing, etc AND [2] has mild symptoms or is well    Negative: [1] Caller demands to speak with the PCP AND [2] about sick adult (or sick caller)    Negative: [1] Angry or rude caller AND [2] doesn't respond to 5 minutes of triager counseling AND [3] sick adult (or caller)    Negative: [1] Skelp worried caller AND [2] second call within 4 hours about the same medical problem    Negative: [1] Skelp worried caller AND [2] third call within 48 hours about the same medical problem    Negative: [1] Skelp worried caller AND  [2] can't be reassured by triager    Negative: Violent behavior, or threatening to physically hurt or kill someone    Negative: [1] Caller is very confused AND [2] no other adult (e.g., friend or family member) available    Negative: Sounds like a life-threatening emergency to the triager    Negative: Child abuse suspected    Negative: Suicide concerns    Negative: Patient sounds very sick or weak to the triager    Protocols used: DIFFICULT CALLER-A-, SUICIDE [id number]

## 2023-05-03 NOTE — DISCHARGE INSTRUCTIONS
Aftercare Plan     Follow up with established providers and supports as scheduled. Continue taking medications as prescribed. Abstain from drugs and alcohol. Utilize your [facility] mental health crisis team as needed. They are available 24/7. Contact information is listed below.      If I am feeling unsafe or I am in a crisis, I will:   Contact my established care providers   Call the [place] Suicide Prevention Lifeline: [phone]   Go to the nearest emergency room   Call 911      Warning signs that I or other people might notice when a crisis is developing for me: changes to sleep, appetite or mood, increased anger, agitation or irritability, feeling depressed or hopeless, spending more time alone or talking less, increased crying, decreased productivity, seeing or hearing things that aren't there, thoughts of not wanting to live anymore or of actually killing myself, thoughts of hurting others     Things I am able to do on my own to cope or help me feel better: watching a favorite tv show or movie, listening to music I enjoy, going outside and breathing fresh air, going for a walk or exercising, taking a shower or bath, a cold or hot beverage, a healthy snack, drawing/coloring/painting, journaling, singing or dancing, deep breathing      I can try practicing square breathing when I begin to feel anxious - inhale through the nose for the count of 4 and the first line on the square. Exhale through the mouth for the count of 4 for the second line of the square. Repeat to complete the square. Repeat the square as many times as needed.     I can also use my five senses to practice mindfulness and grounding. What are five things I can see, four things I can hear, three things I can feel, two things I can smell, and one thing I can taste.      Things that I am able to do with others to cope or help me feel better: sometimes just talking or spending time with someone else, sharing a meal or having coffee, watching a  "movie or playing a game, going for a walk or exercising     I can also use community resources including mental health hotlines, [facility] crisis teams, or apps.      Things I can use or do for distraction: movies/tv, music, reading, games, drawing/coloring/painting or other art, essential oils, exercise, cleaning/organizing, puzzles, crossword puzzles, word search, Sudoku       I can also download a meditation or relaxation kervin, like Calm, Headspace, or Insight Timer (all three offer a free version)     Changes I can make to support my mental health and wellness: Attend scheduled mental health therapy and psychiatric appointments. Take my medications as prescribed. Maintain a daily schedule/routine. Abstain from all mood altering substances, including drugs, alcohol, or medications not currently prescribed to me. Implement a self-care routine.       People in my life that I can ask for help: friends or family, trusted teachers/staff/colleagues, trusted members of my community or place of Orthodox, mental health crisis lines, or 911     Your [facility] has a mental health crisis team you can call 24/7: [facility] Adult, [phone]     Other things that are important when I m in crisis: to remember that the feelings I am having right now are temporary, and it won't feel like this forever, and that it is okay and important to ask for help     Crisis Lines  Crisis Text Line  Text 829599  You will be connected with a trained live crisis counselor to provide support.     Por espanol, texto  SIRENA a 032135 o texto a 442-AYUDAME en [facility] Hope Line  1.800.SUICIDE [4696925]        Community Resources  Fast Tracker  Linking people to mental health and substance use disorder resources  fasttrackermn.org      [place] Mental Health Warm Line  Peer to peer support  Monday thru Saturday, 12 pm to 10 pm  840.886.2125 or 1.459.484.9368  Text \"Support\" to 52267     National [place] on Mental Illness (MAGY) "  603.552.8076 or 1.888.MAGY.HELPS        Mental Health Apps  My3  https://Hachi Labspp.org/     VirtualHopeBox  https://Zerply/apps/virtual-hope-box/        Additional Information  Today you were seen by a licensed mental health professional through Triage and Transition services, [facility] (P)  for a crisis assessment in the Emergency Department at [facility].  It is recommended that you follow up with your established providers (psychiatrist, mental health therapist, and/or primary care doctor - as relevant) as soon as possible. Coordinators from [facility] will be calling you in the next 24-48 hours to ensure that you have the resources you need.  You can also contact [facility] coordinators directly at [phone]. You may have been scheduled for or offered an appointment with a mental health provider. [facility] maintains an extensive network of licensed behavioral health providers to connect patients with the services they need.  We do not charge providers a fee to participate in our referral network.  We match patients with providers based on a patient's specific needs, insurance coverage, and location.  Our first effort will be to refer you to a provider within your care system, and will utilize providers outside your care system as needed.

## 2023-05-03 NOTE — ED TRIAGE NOTES
Suicidal SI no plan. Pt had a care conference with RN, SW, caregiver, primary MD, and CADI  today and is under the impression that they are recommending a 30 day inpatient stay. Pt became upset at this and called the nurse line, who recommended that she come in to be evaluated.          Triage Assessment     Row Name 05/03/23 1246       Triage Assessment (Adult)    Airway WDL WDL       Respiratory WDL    Respiratory WDL WDL       Skin Circulation/Temperature WDL    Skin Circulation/Temperature WDL WDL       Cardiac WDL    Cardiac WDL WDL       Peripheral/Neurovascular WDL    Peripheral Neurovascular WDL WDL       Cognitive/Neuro/Behavioral WDL    Cognitive/Neuro/Behavioral WDL WDL

## 2023-05-03 NOTE — CONSULTS
Diagnostic Evaluation Consultation  Crisis Assessment    Patient was assessed: In Person  Patient location: [facility] ED   Was a release of information signed: No. Reason: .      Referral Data and Chief Complaint  Pt is a 22 year old, who uses she/her pronouns, and presents to the ED alone. Patient is referred to the ED by community provider(s). Patient is presenting to the ED for the following concerns: her team recommended she come .      Informed Consent and Assessment Methods   Patient is her own guardian. Writer met with patient and explained the crisis assessment process, including applicable information disclosures and limits to confidentiality, assessed understanding of the process, and obtained consent to proceed with the assessment. Patient was observed to be able to participate in the assessment as evidenced by engaged in assessment . Assessment methods included conducting a formal interview with patient, review of medical records, collaboration with medical staff, and obtaining relevant collateral information from family and community providers when available..     Over the course of this crisis assessment provided reassurance, offered validation, engaged patient in problem solving and disposition planning and worked with patient on safety and aftercare planning. Patient's response to interventions was engaged in assessment.      Summary of Patient Situation  Pt brought herself to the ED in a medical taxi. She had a team meeting today at [facility].  The team thought she needed to come to the hospital for a 30 day inpatient admission.   Pt came to the ED willingly but says she did not want to.  She is calm and cooperative with staff.  She is able to articulate that she has not been having suicidal thoughts recently.  She feels she has been doing good.  We discussed that she has not been utilizing the ED as frequently and she is pleased with the feedback.  Discussed how she has been managing her symptoms at home.    She endorses trouble sleeping but this is not new.       Brief Psychosocial History  Pt is her own legal guardian. She resides in a group home with 24/7 staffing and supervision. Pt's father passed away approximately two years ago and she is continuing to grieve his death. Pt's support system includes her mother and siblings.      Significant Clinical History  Pt has a history of frequent ED visits with her last visit being 4/18/2023. She often presents at the ED with concerns of NSSI, SI, and auditory hallucinations. Pt engages in NSSI and experiences SI at baseline. She has had numerous inpatient admission most recent 2021.   -  Collateral Information  Phone call with pts PCP, Dr. Wilkins, [facility].  She was a part of a team meeting today regarding pts care. The team is concerned pt is not improving continues to make suicidal comments and report that she is hearing voices, is making questionable decisions, may need a guardian, misses medications and may need a higher level of care.  PCP is advocating for pt to be admitted to inpatient. Discussed with PCP that pt is not reporting an imminent risk or acute change in mental health symptoms and admission is not being recommended.  PCP was not happy with this information and ended the call.   Phone message left for pts , Clotilde [phone].   Phone call with group home staff, Arlene [phone].  She is updated on plan of care and is in agreement to pts return.       Risk Assessment    [place] Suicide Severity Rating Scale Since Last Contact:   Suicidal Ideation (Since Last Contact)  1. Wish to be Dead (Since Last Contact): Yes  2. Non-Specific Active Suicidal Thoughts (Since Last Contact): Yes  3. Active Suicidal Ideation with any Methods (Not Plan) Without Intent to Act (Since Last Contact): No  4. Active Suicidal Ideation with Some Intent to Act, Without Specific Plan (Since Last Contact): No  5. Active Suicidal Ideation with Specific Plan and Intent  (Since Last Contact): No  Suicidal Behavior (Since Last Contact)  Actual Attempt (Since Last Contact): No  Interrupted Attempts (Since Last Contact): No  Aborted or Self-Interrupted Attempt (Since Last Contact): No  Preparatory Acts or Behavior (Since Last Contact): No  Suicide (Since Last Contact): No     C-SSRS Risk (Since Last Contact)  Calculated C-SSRS Risk Score (Since Last Contact): Low Risk    Validity of evaluation is not impacted by presenting factors during interview .   Comments regarding subjective versus objective responses to Lipscomb tool: No acute changes   Environmental or Psychosocial Events: challenging interpersonal relationships and other life stressors  Chronic Risk Factors: history of suicide attempts (.), history of psychiatric hospitalization, chronic and ongoing sleep difficulties, history of attachment issues, serious, persistent mental illness, personality disorders and history of Non-Suicidal Self Injury (NSSI)   Warning Signs: seeking access to means to hurt or kill self and acting reckless or engaging in risky activities  Protective Factors: strong bond to family unit, community support, or employment, lives in a responsibly safe and stable environment, good treatment engagement, able to access care without barriers, supportive ongoing medical and mental health care relationships and help seeking  Interpretation of Risk Scoring, Risk Mitigation Interventions and Safety Plan:  Chronic risk      Does the patient have thoughts of harming others? No     Is the patient engaging in sexually inappropriate behavior?  no        Current Substance Abuse   Is there recent substance abuse? no     Was a urine drug screen or blood alcohol level obtained: No       Mental Status Exam   Affect: Appropriate   Appearance: Appropriate    Attention Span/Concentration: Attentive  Eye Contact: Variable   Fund of Knowledge: Appropriate    Language /Speech Content: Fluent   Language /Speech Volume: Normal     Language /Speech Rate/Productions: Normal    Recent Memory: Intact   Remote Memory: Intact   Mood: Normal    Orientation to Person: Yes    Orientation to Place: Yes   Orientation to Time of Day: Yes    Orientation to Date: Yes    Situation (Do they understand why they are here?): Yes    Psychomotor Behavior: Normal    Thought Content: Clear   Thought Form: Goal Directed and Intact      History of commitment: No     Medication  Psychotropic medications: Yes   Medication changes made in the last two weeks: No     Current Care Team  Primary Care Provider: Dr. Jess Wilkins MD, [facility]  Psychiatrist: Emma Jacobson, [facility], [facility]  Therapist: Rd Barton MA, [facility], [facility]. Pt is also planning on starting trauma therapy soon.  : Yes     CTSS or ARMHS: No  ACT Team: No  Other: No        Diagnosis  301.83 (F60.3) Borderline Personality Disorder - by history  Intellectual Disabilites  319 (F79) Unspecified Intellectual Disability (Intellectual Developmental Disorder) - by history  Other Unspecified and Specified Bipolar and Related Disorder 296.80 (F31.9) Unspecified Bipolar and Related Disorder - by history    Clinical Summary and Substantiation of Recommendations    Pt brought self to the ED at the recommendation of her treatment team who told her she needed a 30 day inpatient admission for mental health.  Pt does not report any acute changes to her mental health symptoms. Her outpatient team has concerns that she is not improving with current outpatient supports.  Information gathered from pt and collateral does not indicate acute change in symptoms or risk. Recommendation is for discharge.  If her team has concerns about her ability to make her own decisions they can pursue guardianship through the court process. It may also benefit pt to be engaged in day programming during the day increase her structure and encourage pts self confidence.Admission to Inpatient Level of Care is indicated due  to:    Disposition  Recommended disposition: Individual Therapy, Medication Management and Group Home: .       Reviewed case and recommendations with attending provider. Attending Name: Dr Olea        Attending concurs with disposition: Yes       Patient and/or validated legal guardian concurs with disposition: Yes       Final disposition: Individual therapy , Medication management and Group home: . .   Outpatient Details (if applicable):   Aftercare plan and appointments placed in the AVS and provided to patient: Yes. Given to patient by ED RN     Was lethal means counseling provided as a part of aftercare planning? Yes - describe safety planning discussed       Assessment Details  Patient interview started at: 1320 and completed at: 1340.     Total duration spent on the patient case in minutes: .50 hrs      CPT code(s) utilized: 43230 - Psychotherapy for Crisis - 60 (30-74*) min       ZEESHAN Hernández, [facility], Psychotherapist  [facility] - Triage & Transition Services  Callback: [phone]

## 2023-05-03 NOTE — ED PROVIDER NOTES
ED Provider Note  [facility]      History     Chief Complaint   Patient presents with     Suicidal     SI no plan. Pt had a care conference with RN, SW, caregiver, primary MD, and CADI  today and is under the impression that they are recommending a 30 day inpatient stay. Pt became upset at this and called the nurse line, who recommended that she come in to be evaluated.     HPI  Sadaf Ross is a 22 year old female who is well-known to the emergency room who arrives here after calling for medical transport.  Patient states that she was in a meeting involving several different providers.  Patient says that she was very upset by what they said during the meeting and left the meeting called the nurse line and had recommendation to come to the emergency room.  Patient states that her caregivers were telling her that she should be hospitalized for 30 days on an inpatient unit and that she needed a different level of care than her current group home.  This was very upsetting to the patient she made a suicidal statement when she talked with the nurse line and had a recommendation to arrive here.  In further discussions here patient denies any intent states that she was very upset by what the caregivers have been telling her and that she does not want to be in the hospital at this time.  Patient has a history of chronic suicidal ideation she states that she does not have any change in her chronic thoughts but denies any intent or plan to harm self.  I did have a discussion with one of the attendees of the meeting that stated that their intention is that Sadaf may need higher level of care than her current group home setting and there was discussion about whether or not she needed guardianship or commitment.  There was some communication that patient would most likely have inpatient hospitalization in order to accomplish this however further discussion with provider is that the  patient's county  can pursue guardianship through the courts.  Patient did not have an acute change in her chronic psychiatric concerns and is making statements that she is able to stay safe if discharged back to her group home.    Past Medical History  Past Medical History:   Diagnosis Date     ADHD (attention deficit hyperactivity disorder)      Bipolar 1 disorder (H)      Borderline personality disorder (H)      Depression      Depressive disorder      Intellectual disability      Obesity      Syncope      Past Surgical History:   Procedure Laterality Date     APPENDECTOMY       APPENDECTOMY       acetaminophen (TYLENOL) 325 MG tablet  albuterol (PROAIR HFA/PROVENTIL HFA/VENTOLIN HFA) 108 (90 Base) MCG/ACT inhaler  alum & mag hydroxide-simethicone (MAALOX  ES) 400-400-40 MG/5ML SUSP suspension  ARIPiprazole lauroxil ER (ARISTADA) 882 MG/3.2ML intra-muscular  benzonatate (TESSALON) 200 MG capsule  benztropine (COGENTIN) 1 MG tablet  bisacodyl (DULCOLAX) 5 MG EC tablet  calcium carbonate (TUMS) 500 MG chewable tablet  clobetasol (TEMOVATE) 0.05 % CREA cream  cyclobenzaprine (FLEXERIL) 10 MG tablet  diclofenac (VOLTAREN) 1 % topical gel  docusate sodium (COLACE) 50 MG capsule  fluticasone (FLONASE) 50 MCG/ACT nasal spray  guaiFENesin (MUCINEX) 600 MG 12 hr tablet  hydrocortisone (CORTAID) 0.5 % external cream  hydrOXYzine (ATARAX) 50 MG tablet  hyoscyamine (LEVSIN/SL) 0.125 MG sublingual tablet  ibuprofen (ADVIL/MOTRIN) 400 MG tablet  loperamide (IMODIUM) 2 MG capsule  loratadine (CLARITIN) 10 MG tablet  LORazepam (ATIVAN) 0.5 MG tablet  multivitamin, therapeutic (THERA-VIT) TABS tablet  OLANZapine zydis (ZYPREXA) 5 MG ODT  omeprazole (PRILOSEC) 40 MG DR capsule  ondansetron (ZOFRAN) 4 MG tablet  ondansetron (ZOFRAN) 4 MG tablet  polyethylene glycol (MIRALAX) 17 GM/Dose powder  prochlorperazine (COMPAZINE) 10 MG tablet  promethazine (PHENERGAN) 12.5 MG tablet  sennosides (SENOKOT) 8.6 MG tablet  traZODone  "(DESYREL) 50 MG tablet  venlafaxine (EFFEXOR-ER) 225 MG 24 hr tablet  Vitamin D, Cholecalciferol, 25 MCG (1000 UT) TABS      Allergies   Allergen Reactions     Penicillins Rash and Unknown     Family History  Family History   Problem Relation Age of Onset     Diabetes Type 1 Father      Cancer Paternal Grandfather      Social History   Social History     Tobacco Use     Smoking status: Every Day     Packs/day: 1.00     Years: 5.00     Pack years: 5.00     Types: Vaping Device, Cigarettes     Smokeless tobacco: Never   Substance Use Topics     Alcohol use: No     Drug use: No         A medically appropriate review of systems was performed with pertinent positives and negatives noted in the HPI, and all other systems negative.    Physical Exam   BP: (!) 146/76  Pulse: 116  Temp: 99.1  F (37.3  C)  Resp: 18  Height: 162.6 cm (5' 4\")  Weight: 109.3 kg (241 lb)  SpO2: 98 %  Physical Exam  Constitutional:       General: She is not in acute distress.     Appearance: Normal appearance. She is not diaphoretic.   HENT:      Head: Atraumatic.      Mouth/Throat:      Mouth: Mucous membranes are moist.   Eyes:      General: No scleral icterus.     Conjunctiva/sclera: Conjunctivae normal.   Cardiovascular:      Rate and Rhythm: Normal rate.      Heart sounds: Normal heart sounds.   Pulmonary:      Effort: No respiratory distress.      Breath sounds: Normal breath sounds.   Abdominal:      General: Abdomen is flat.   Musculoskeletal:      Cervical back: Neck supple.   Skin:     General: Skin is warm.      Findings: No rash.   Neurological:      General: No focal deficit present.      Mental Status: She is alert.      Sensory: No sensory deficit.      Motor: No weakness.      Coordination: Coordination normal.   Psychiatric:         Mood and Affect: Mood is anxious. Affect is tearful.         Speech: Speech normal.         Behavior: Behavior is cooperative.         Thought Content: Thought content does not include homicidal or " suicidal ideation.           ED Course, Procedures, & Data      Procedures      Suicide assessment completed by mental health (D.EKarinaC., LCSW, etc.)         Medications - No data to display  Labs Ordered and Resulted from Time of ED Arrival to Time of ED Departure - No data to display  No orders to display          Critical care was not performed.     Medical Decision Making  The patient's presentation was of moderate complexity (a chronic illness mild to moderate exacerbation, progression, or side effect of treatment).    The patient's evaluation involved:  discussion of management or test interpretation with another health professional (Patient was seen and evaluated by the DEC  please refer to their documentation discussion of management occurred after evaluation)    The patient's management necessitated high risk (a decision regarding hospitalization).      Assessment & Plan        I have reviewed the nursing notes. I have reviewed the findings, diagnosis, plan and need for follow up with the patient.    Patient with baseline passive suicidal ideation at this time upset about caregiver meeting in which they discussed the possibility of hospitalization.  Patient feels as though she could be safe at the group home and after our evaluation will be discharged back to the group home.  We did discuss the possibility of pursuing different placement with the  and group home staff including the possibility of petitioning for guardianship and/or [facility] placement patient's outpatient care team will likely be pursuing these options.    Final diagnoses:   Intellectual disability   Borderline personality disorder (H)   Passive suicidal ideations       Robert Olea  [facility] EMERGENCY DEPARTMENT  5/3/2023     Robert Olea MD  05/03/23 4232

## 2023-05-04 ENCOUNTER — HOSPITAL ENCOUNTER (EMERGENCY)
Facility: CLINIC | Age: 23
Discharge: HOME | End: 2023-05-04
Attending: FAMILY MEDICINE | Admitting: FAMILY MEDICINE
Payer: MEDICARE

## 2023-05-04 VITALS
OXYGEN SATURATION: 97 % | WEIGHT: 241 LBS | BODY MASS INDEX: 42.7 KG/M2 | RESPIRATION RATE: 16 BRPM | SYSTOLIC BLOOD PRESSURE: 122 MMHG | HEIGHT: 63 IN | TEMPERATURE: 98.2 F | DIASTOLIC BLOOD PRESSURE: 79 MMHG | HEART RATE: 113 BPM

## 2023-05-04 DIAGNOSIS — F79 INTELLECTUAL DISABILITY: Chronic | ICD-10-CM

## 2023-05-04 DIAGNOSIS — F60.3 BORDERLINE PERSONALITY DISORDER (H): Chronic | ICD-10-CM

## 2023-05-04 DIAGNOSIS — R45.851 SUICIDAL IDEATIONS: ICD-10-CM

## 2023-05-04 LAB — SARS-COV-2 RNA RESP QL NAA+PROBE: NEGATIVE

## 2023-05-04 PROCEDURE — 99284 EMERGENCY DEPT VISIT MOD MDM: CPT | Performed by: FAMILY MEDICINE

## 2023-05-04 PROCEDURE — U0005 INFEC AGEN DETEC AMPLI PROBE: HCPCS | Performed by: FAMILY MEDICINE

## 2023-05-04 PROCEDURE — 99285 EMERGENCY DEPT VISIT HI MDM: CPT | Mod: 25 | Performed by: FAMILY MEDICINE

## 2023-05-04 PROCEDURE — 90791 PSYCH DIAGNOSTIC EVALUATION: CPT

## 2023-05-04 PROCEDURE — C9803 HOPD COVID-19 SPEC COLLECT: HCPCS | Performed by: FAMILY MEDICINE

## 2023-05-04 PROCEDURE — 250N000013 HC RX MED GY IP 250 OP 250 PS 637: Performed by: FAMILY MEDICINE

## 2023-05-04 RX ORDER — OLANZAPINE 10 MG/1
10 TABLET, ORALLY DISINTEGRATING ORAL ONCE
Status: COMPLETED | OUTPATIENT
Start: 2023-05-04 | End: 2023-05-04

## 2023-05-04 RX ADMIN — OLANZAPINE 10 MG: 10 TABLET, ORALLY DISINTEGRATING ORAL at 18:07

## 2023-05-04 ASSESSMENT — ACTIVITIES OF DAILY LIVING (ADL)
ADLS_ACUITY_SCORE: 37

## 2023-05-04 NOTE — ED PROVIDER NOTES
ED Provider Note  [facility]      History     Chief Complaint   Patient presents with     Suicidal     Thoughts, no plan. Biting R hand en route     HPI  Sadaf Ross is a 22 year old female who is well-known to the emergency room with a past medical history significant for bipolar disorder, intellectual disability, and borderline personality disorder who presents to the Emergency Department for evaluation of ongoing thoughts of both suicide and self injury patient has multiple emergency room visits and at this time with the chronic nature of her presentation patient actually denies any intent to commit suicide and states that she is now able to maintain safety.  Patient had been involved with the care plan meeting in which outpatient providers had advised that she be admitted she was very upset by this and continues to have some agitation as a result patient however is essentially at baseline here in emergency room and is not an immediate threat to herself or others and will be discharged.      Past Medical History  Past Medical History:   Diagnosis Date     ADHD (attention deficit hyperactivity disorder)      Bipolar 1 disorder (H)      Borderline personality disorder (H)      Depression      Depressive disorder      Intellectual disability      Obesity      Syncope      Past Surgical History:   Procedure Laterality Date     APPENDECTOMY       APPENDECTOMY       acetaminophen (TYLENOL) 325 MG tablet  albuterol (PROAIR HFA/PROVENTIL HFA/VENTOLIN HFA) 108 (90 Base) MCG/ACT inhaler  alum & mag hydroxide-simethicone (MAALOX  ES) 400-400-40 MG/5ML SUSP suspension  ARIPiprazole lauroxil ER (ARISTADA) 882 MG/3.2ML intra-muscular  benzonatate (TESSALON) 200 MG capsule  benztropine (COGENTIN) 1 MG tablet  bisacodyl (DULCOLAX) 5 MG EC tablet  calcium carbonate (TUMS) 500 MG chewable tablet  clobetasol (TEMOVATE) 0.05 % CREA cream  cyclobenzaprine (FLEXERIL) 10 MG tablet  diclofenac  "(VOLTAREN) 1 % topical gel  docusate sodium (COLACE) 50 MG capsule  fluticasone (FLONASE) 50 MCG/ACT nasal spray  guaiFENesin (MUCINEX) 600 MG 12 hr tablet  hydrocortisone (CORTAID) 0.5 % external cream  hydrOXYzine (ATARAX) 50 MG tablet  hyoscyamine (LEVSIN/SL) 0.125 MG sublingual tablet  ibuprofen (ADVIL/MOTRIN) 400 MG tablet  loperamide (IMODIUM) 2 MG capsule  loratadine (CLARITIN) 10 MG tablet  LORazepam (ATIVAN) 0.5 MG tablet  multivitamin, therapeutic (THERA-VIT) TABS tablet  OLANZapine zydis (ZYPREXA) 5 MG ODT  omeprazole (PRILOSEC) 40 MG DR capsule  ondansetron (ZOFRAN) 4 MG tablet  ondansetron (ZOFRAN) 4 MG tablet  polyethylene glycol (MIRALAX) 17 GM/Dose powder  prochlorperazine (COMPAZINE) 10 MG tablet  promethazine (PHENERGAN) 12.5 MG tablet  sennosides (SENOKOT) 8.6 MG tablet  traZODone (DESYREL) 50 MG tablet  venlafaxine (EFFEXOR-ER) 225 MG 24 hr tablet  Vitamin D, Cholecalciferol, 25 MCG (1000 UT) TABS      Allergies   Allergen Reactions     Penicillins Rash and Unknown     Family History  Family History   Problem Relation Age of Onset     Diabetes Type 1 Father      Cancer Paternal Grandfather      Social History   Social History     Tobacco Use     Smoking status: Every Day     Packs/day: 1.00     Years: 5.00     Pack years: 5.00     Types: Vaping Device, Cigarettes     Smokeless tobacco: Never   Substance Use Topics     Alcohol use: No     Drug use: No         A medically appropriate review of systems was performed with pertinent positives and negatives noted in the HPI, and all other systems negative.    Physical Exam   BP: 122/79  Pulse: 113  Temp: 98.2  F (36.8  C)  Resp: 16  Height: 160 cm (5' 3\")  Weight: 109.3 kg (241 lb)  SpO2: 97 %  Physical Exam  Constitutional:       General: She is not in acute distress.     Appearance: Normal appearance. She is not diaphoretic.   HENT:      Head: Atraumatic.      Mouth/Throat:      Mouth: Mucous membranes are moist.   Eyes:      General: No scleral " icterus.     Conjunctiva/sclera: Conjunctivae normal.   Cardiovascular:      Rate and Rhythm: Normal rate.      Heart sounds: Normal heart sounds.   Pulmonary:      Effort: No respiratory distress.      Breath sounds: Normal breath sounds.   Abdominal:      General: Abdomen is flat.   Musculoskeletal:      Cervical back: Neck supple.   Skin:     General: Skin is warm.      Findings: No rash.   Neurological:      Mental Status: She is alert.      Sensory: No sensory deficit.      Motor: No weakness.      Coordination: Coordination normal.   Psychiatric:         Mood and Affect: Mood is anxious.         Behavior: Behavior is cooperative.         Thought Content: Thought content does not include homicidal or suicidal ideation.         Judgment: Judgment is impulsive.         ED Course, Procedures, & Data      Procedures              Suicide assessment completed by mental health (D.E.C., LCSW, etc.)       Results for orders placed or performed during the hospital encounter of 05/04/23   Asymptomatic COVID-19 Virus (Coronavirus) by PCR Nose     Status: Normal    Specimen: Nose; Swab   Result Value Ref Range    SARS CoV2 PCR Negative Negative    Narrative    Testing was performed using the Xpert Xpress SARS-CoV-2 Assay on the Cepheid Gene-Xpert Instrument Systems. Additional information about this Emergency Use Authorization (EUA) assay can be found via the Lab Guide. This test should be ordered for the detection of SARS-CoV-2 in individuals who meet SARS-CoV-2 clinical and/or epidemiological criteria as well as from individuals without symptoms or other reasons to suspect COVID-19. Test performance for asymptomatic patients has only been established in anterior nasal swab specimens. This test is for in vitro diagnostic use under the FDA EUA for laboratories certified under CLIA to perform high complexity testing. This test has not been FDA cleared or approved. A negative result does not rule out the presence of PCR  "inhibitors in the specimen or target RNA concentration below the limit of detection for the assay. The possibility of a false negative should be considered if the patient's recent exposure or clinical presentation suggests COVID-19. This test was validated by the [facility]. This laboratory is certified under the Clinical Laboratory Improvement Amendments (CLIA) as qualified to perform high complexity laboratory testing.       Medications   OLANZapine zydis (zyPREXA) ODT tab 10 mg (10 mg Oral $Given 5/4/23 4975)     Labs Ordered and Resulted from Time of ED Arrival to Time of ED Departure   COVID-19 VIRUS (CORONAVIRUS) BY PCR - Normal       Result Value    SARS CoV2 PCR Negative       No orders to display          Critical care was not performed.     Medical Decision Making  The patient's presentation was of moderate complexity (a chronic illness mild to moderate exacerbation, progression, or side effect of treatment).    The patient's evaluation involved:  discussion of management or test interpretation with another health professional (Face-to-face discussion with independent provider from DEC assessment discussing possibility of outpatient management versus hospitalization we will discharge patient back to group home at this time.)    The patient's management necessitated high risk (a decision regarding hospitalization).      Assessment & Plan        I have reviewed the nursing notes. I have reviewed the findings, diagnosis, plan and need for follow up with the patient.      Patient was again well-known to the emergency room at this time I am concerned that there was communication by her care system that she would benefit from a \"30-day hospital stay\" we were able to contact both  and primary care physician the  was able to talk with them about patient's chronic current mental health situation and that if they were pursuing guardianship they can do so without " having the patient be hospitalized once again patient is essentially at baseline and will be returning back to her group home.    Final diagnoses:   Intellectual disability   Borderline personality disorder (H)   Suicidal ideations       Robert Olea  [facility] EMERGENCY DEPARTMENT  5/4/2023     Robert Olea MD  05/10/23 0704

## 2023-05-04 NOTE — ED NOTES
Bed: URE-B  Expected date:   Expected time:   Means of arrival:   Comments:  25 yo F, SI, Yellow 20 min., N 712

## 2023-05-04 NOTE — ED NOTES
Pt was at  and was feeling suicidal. Pt was crying upon arrival by EMS, outside. Pt states she wants to die and be with her dad. Pt was biting right hand en route. Pt states she talked to her therapist this morning who recommend pt come to the ED.

## 2023-05-05 ENCOUNTER — HOSPITAL ENCOUNTER (EMERGENCY)
Facility: CLINIC | Age: 23
Discharge: NURSING, REHAB OR LONG-TERM CARE | End: 2023-05-05
Attending: EMERGENCY MEDICINE
Payer: MEDICARE

## 2023-05-05 ENCOUNTER — HOSPITAL ENCOUNTER (EMERGENCY)
Facility: CLINIC | Age: 23
Discharge: HOME | End: 2023-05-05
Attending: EMERGENCY MEDICINE
Payer: MEDICARE

## 2023-05-05 VITALS
OXYGEN SATURATION: 97 % | HEART RATE: 98 BPM | RESPIRATION RATE: 16 BRPM | TEMPERATURE: 99.6 F | DIASTOLIC BLOOD PRESSURE: 94 MMHG | SYSTOLIC BLOOD PRESSURE: 145 MMHG

## 2023-05-05 VITALS
HEART RATE: 77 BPM | TEMPERATURE: 98 F | DIASTOLIC BLOOD PRESSURE: 104 MMHG | RESPIRATION RATE: 16 BRPM | SYSTOLIC BLOOD PRESSURE: 154 MMHG | OXYGEN SATURATION: 100 %

## 2023-05-05 DIAGNOSIS — Z72.89 SELF-INJURIOUS BEHAVIOR: ICD-10-CM

## 2023-05-05 DIAGNOSIS — F79 INTELLECTUAL DISABILITY: ICD-10-CM

## 2023-05-05 DIAGNOSIS — F60.3 BORDERLINE PERSONALITY DISORDER (H): ICD-10-CM

## 2023-05-05 PROCEDURE — 250N000013 HC RX MED GY IP 250 OP 250 PS 637: Performed by: EMERGENCY MEDICINE

## 2023-05-05 PROCEDURE — 99283 EMERGENCY DEPT VISIT LOW MDM: CPT | Mod: 25 | Performed by: EMERGENCY MEDICINE

## 2023-05-05 PROCEDURE — 99285 EMERGENCY DEPT VISIT HI MDM: CPT | Mod: 27 | Performed by: EMERGENCY MEDICINE

## 2023-05-05 PROCEDURE — 99284 EMERGENCY DEPT VISIT MOD MDM: CPT | Performed by: EMERGENCY MEDICINE

## 2023-05-05 PROCEDURE — 99285 EMERGENCY DEPT VISIT HI MDM: CPT | Performed by: EMERGENCY MEDICINE

## 2023-05-05 RX ORDER — OLANZAPINE 10 MG/1
10 TABLET, ORALLY DISINTEGRATING ORAL ONCE
Status: COMPLETED | OUTPATIENT
Start: 2023-05-05 | End: 2023-05-05

## 2023-05-05 RX ADMIN — OLANZAPINE 10 MG: 10 TABLET, ORALLY DISINTEGRATING ORAL at 16:56

## 2023-05-05 ASSESSMENT — ACTIVITIES OF DAILY LIVING (ADL)
ADLS_ACUITY_SCORE: 37

## 2023-05-05 NOTE — ED TRIAGE NOTES
Thoughts, no plan. Biting R hand en route     Triage Assessment     Row Name 05/04/23 7668       Triage Assessment (Adult)    Airway WDL WDL       Respiratory WDL    Respiratory WDL WDL       Skin Circulation/Temperature WDL    Skin Circulation/Temperature WDL WDL       Cardiac WDL    Cardiac WDL WDL       Peripheral/Neurovascular WDL    Peripheral Neurovascular WDL WDL       Cognitive/Neuro/Behavioral WDL    Cognitive/Neuro/Behavioral WDL WDL

## 2023-05-05 NOTE — ED NOTES
Arlene at the patient's group home [phone] contacted, pt is discharging and the group home knows to expect her. Pt's AVS discharge notes are complete, pt ready for discharge.

## 2023-05-05 NOTE — CONSULTS
Diagnostic Evaluation Consultation  Crisis Assessment    Patient was assessed: In Person  Patient location: [facility] Adult ED  Was a release of information signed: No. Reason: pt declined      Referral Data and Chief Complaint  Sadaf Ross is a 22 year old, who uses she/her pronouns, and presents to the ED via EMS. Patient is referred to the ED by community provider(s). Patient is presenting to the ED for the following concerns: suicidal ideation.      Informed Consent and Assessment Methods     Patient is her own guardian. Writer met with patient and explained the crisis assessment process, including applicable information disclosures and limits to confidentiality. The patient was unwilling to participate in a formal crisis assessment because patient appeared extremely somnolent, answered a few questions but then would not respond. Due to this, assessment methods were limited to review of medical records, collaboration with medical staff, and obtaining relevant collateral information from family and community providers when available.    Over the course of this crisis assessment provided reassurance and offered validation. Patient's response to interventions was limited due to pt's somnolence.      Summary of Patient Situation       Pt arrived to the ED via EMS for evaluation of suicidal ideation after a care team meeting that took place yesterday upset her. The pt reports that her care team told her she needed to come to the hospital for a 30 day stay, which she does not to happen. The pt was seen in the ED here yesterday after the meeting occurred and was able to discharge back to her group home. The pt reports that she had a therapy appt earlier today and that she became upset again when she discussed the meeting details. The pt states her therapist recommended she come to the ED for evaluation. The pt reported that she engaged in SIB of biting her hand, a red bite bianca was slightly visible on the  patients hand but was not bleeding and did not require a bandage. The pt provided the above details but then closed her eyes and would not answer more questions.     The pt is well-known to the ED and has a diagnosis hx of bipolar disorder, borderline personality disorder, and intellectual disability. The pt has an active ED care plan to discourage the pt's frequent visits to the emergency department.     Brief Psychosocial History       Pt is her own legal guardian and resides in a group home with 24/7 staffing/supervision. Pt's father passed away approximately two years ago and the pt has stated she continues to struggle with his passing. The pt's mom and sister live in [place] and she states they are a source of support. Pt has established outpatient resources in place. Pt denies legal issues and is not employed.    Significant Clinical History       Pt has an extensive hx of ED visits, with the most recent being yesterday, 5/3/2023, at [facility]. Pt typically presents regarding various chief complaints, seeking secondary gain from attention in the ED and trying to connect with other patients. Pt has a hx of inpatient hospitalizations and has been encouraged to speak to staff at her group home before calling the ED. Patient has a care plan at her group home, and one in place at [facility] ED:    The pt has become increasingly disruptive in the Emergency Department.  The pt will yell, demand, and scream and disrupt the milieu, and this happens before she finds out the recommendation to send her back to her group home.  Today, the pt postured toward the ED doctor and threatened to  punch him.   During one of her ED visits over the past week, the pt befriended another patient who is waiting for an inpatient bed.  The pt has been texting and contacting this patient.  There is concern that one of the reasons the pt is coming to the ED is to interact with this patient.        An Emergency Department Care Plan is being sought  in order to reduce and extinguish the pt s numerous ED visits.  The pt does have an active outpatient team that the pt can utilize for support and lives in a group home with 24/7 staffing.  Typically, the pt calls 911, not group home staff.  Additionally, [facility] have attempted to recommend additionally services, such as day treatment, and the pt refuses the recommendations.  It appears that the pt comes to the ED, requesting admission as she does not like her group home.        To that end, it is recommended that if the pt returns to the Emergency Department, she be triaged and if there is not any acute new symptoms, both medically and mental health-wise, the group home be called and informed that the pt is returning and medical transport be set up for her return.  If the pt becomes dysregulated, the pt should be offered/given appropriate medications.  This should not hinder her return to her group home.        Collateral Information  Arlene ([phone]) at the patient's group home contacted, updated on plan of care and the pt is able to return at anytime.          Risk Assessment  [place] Suicide Severity Rating Scale Since Last Contact: 5/4/2023  Suicidal Ideation (Since Last Contact)  1. Wish to be Dead (Since Last Contact): Yes  2. Non-Specific Active Suicidal Thoughts (Since Last Contact): Yes  3. Active Suicidal Ideation with any Methods (Not Plan) Without Intent to Act (Since Last Contact): Yes  4. Active Suicidal Ideation with Some Intent to Act, Without Specific Plan (Since Last Contact): Yes  5. Active Suicidal Ideation with Specific Plan and Intent (Since Last Contact): Yes  Suicidal Behavior (Since Last Contact)  Actual Attempt (Since Last Contact): No  Has subject engaged in non-suicidal self-injurious behavior? (Since Last Contact): Yes  Interrupted Attempts (Since Last Contact): No  Aborted or Self-Interrupted Attempt (Since Last Contact): No  Preparatory Acts or Behavior (Since Last Contact):  No  Suicide (Since Last Contact): No     C-SSRS Risk (Since Last Contact)  Calculated C-SSRS Risk Score (Since Last Contact): High Risk     Validity of evaluation is impacted by presenting factors during interview.   Comments regarding subjective versus objective responses to [place] tool: none. See clinical narrative  Environmental or Psychosocial Events: loss of a loved one, helplessness/hopelessness, impulsivity/recklessness and anniversary of traumatizing events  Chronic Risk Factors: history of psychiatric hospitalization, serious, persistent mental illness, personality disorders and history of Non-Suicidal Self Injury (NSSI)   Warning Signs: hopelessness, anxiety, agitation, unable to sleep, sleeping all the time and recent discharges from emergency department or inpatient psychiatric care  Protective Factors: strong bond to family unit, community support, or employment, lives in a responsibly safe and stable environment, good treatment engagement, sense of importance of health and wellness, able to access care without barriers, supportive ongoing medical and mental health care relationships and help seeking  Interpretation of Risk Scoring, Risk Mitigation Interventions and Safety Plan:  Pt is suicidal at her baseline, pt is not endorsing current SI and states she is okay going back to group home. She is able to engage in safety planning and group [facility] has a safety plan with her, including they are staffed 24/7 and are able to support pt.     Does the patient have thoughts of harming others? No     Is the patient engaging in sexually inappropriate behavior?  no        Current Substance Abuse     Is there recent substance abuse? no     Was a urine drug screen or blood alcohol level obtained: No       Mental Status Exam     Affect: Flat   Appearance: Appropriate    Attention Span/Concentration: Other: variable  Eye Contact: Variable   Fund of Knowledge: Appropriate    Language /Speech Content: Fluent   Language  /Speech Volume: Normal    Language /Speech Rate/Productions: Minimally Responsive    Recent Memory: Intact   Remote Memory: Intact   Mood: Normal    Orientation to Person: Yes    Orientation to Place: Yes   Orientation to Time of Day: Yes    Orientation to Date: Yes    Situation (Do they understand why they are here?): Yes    Psychomotor Behavior: Normal and Other: patient appeared to be very somnolent    Thought Content: Clear   Thought Form: Goal Directed and Intact      History of commitment: No       Medication    Psychotropic medications:   Current Outpatient Medications   Medication     acetaminophen (TYLENOL) 325 MG tablet     alum & mag hydroxide-simethicone (MAALOX  ES) 400-400-40 MG/5ML SUSP suspension     ARIPiprazole lauroxil ER (ARISTADA) 882 MG/3.2ML intra-muscular     benzonatate (TESSALON) 200 MG capsule     benztropine (COGENTIN) 1 MG tablet     calcium carbonate (TUMS) 500 MG chewable tablet     clobetasol (TEMOVATE) 0.05 % CREA cream     cyclobenzaprine (FLEXERIL) 10 MG tablet     diclofenac (VOLTAREN) 1 % topical gel     docusate sodium (COLACE) 50 MG capsule     fluticasone (FLONASE) 50 MCG/ACT nasal spray     guaiFENesin (MUCINEX) 600 MG 12 hr tablet     hydrocortisone (CORTAID) 0.5 % external cream     hydrOXYzine (ATARAX) 50 MG tablet     hyoscyamine (LEVSIN/SL) 0.125 MG sublingual tablet     ibuprofen (ADVIL/MOTRIN) 400 MG tablet     loperamide (IMODIUM) 2 MG capsule     LORazepam (ATIVAN) 0.5 MG tablet     multivitamin, therapeutic (THERA-VIT) TABS tablet     OLANZapine zydis (ZYPREXA) 5 MG ODT     omeprazole (PRILOSEC) 40 MG DR capsule     ondansetron (ZOFRAN) 4 MG tablet     polyethylene glycol (MIRALAX) 17 GM/Dose powder     prochlorperazine (COMPAZINE) 10 MG tablet     promethazine (PHENERGAN) 12.5 MG tablet     sennosides (SENOKOT) 8.6 MG tablet     traZODone (DESYREL) 50 MG tablet     venlafaxine (EFFEXOR-ER) 225 MG 24 hr tablet     Vitamin D, Cholecalciferol, 25 MCG (1000 UT) TABS      Medication compliant: Yes. Recent medication changes: No  Medication changes made in the last two weeks: No         Current Care Team    Primary Care Provider: Jess Wilkins MD - [facility]  Psychiatrist: Emma Jacobson NP - [facility]  Therapist: Rd Barton MA, [facility] - [facility], and is starting trauma therapy with Lynne Sawyer Tannerdaviddarinelbreanna nicolette.  : Clotilde Baker [phone]  Other: Group home.      Diagnosis    301.83 (F60.3) Borderline Personality Disorder     Intellectual Disabilites  319 (F79) Unspecified Intellectual Disability (Intellectual Developmental Disorder) - by history     296.50 Bipolar I Disorder Current or Most Recent Episode Depressed, unspecified - by history     Clinical Summary and Substantiation of Recommendations    Pt called EMS for transport to the ED and states that she had an outpatient therapy appointment earlier today and her provider encouraged the pt to come in for an evaluation. The pt states that yesterday her care team had a meeting and told her that she needed to be admitted to the hospital for a 30 day mental health inpatient stay. The pt reports this was upsetting, which resulted in the pt coming into the ED yesterday for assessment. The pt discharged back to the group home and after her therapy appt today felt upset and wanted to come back to the ED. The pt states that she does not want to be admitted to the hospital. Information gathered from the pt and review of Epic records from yesterday does not indicate a change in pt's symptoms or an increased level of risk. Pt's recommended disposition is discharge, attending provider consulted and agrees with recommendation.     Disposition    Recommended disposition: Group Home: Pt to discharge back to established group home and instructed to continue working with established MH providers.        Reviewed case and recommendations with attending provider. Attending Name: Dr. Olea       Attending concurs with  disposition: Yes       Patient and/or validated legal guardian concurs with disposition: Yes       Final disposition: Group home: Pt to discharge back to established group home and instructed to continue working with established MH providers.    .     Inpatient Details (if applicable):   Is patient admitted voluntarily:NA      Patient aware of potential for transfer if there is not appropriate placement? NA       [facility]      Behavioral Intake Notified? NA     Outpatient Details (if applicable):   Aftercare plan and appointments placed in the AVS and provided to patient: Yes. Given to patient by EZEQUIEL Toney    Was lethal means counseling provided as a part of aftercare planning? No; established safety plan at pt's group home      Assessment Details    Patient interview started at: 7:00 PM and completed at: 7:35 PM.     Total duration spent on the patient case in minutes: .50 hrs      CPT code(s) utilized: 52125 - Psychotherapy for Crisis - 60 (30-74*) min       ELENA HUBBARD, Psychotherapist Trainee, Psychotherapist  DEC - Triage & Transition Services  Callback: [phone]      Aftercare Plan     Follow up with established providers and supports as scheduled. Continue taking medications as prescribed. Abstain from drugs and alcohol. Utilize your [facility] mental health crisis team as needed. They are available 24/7. Contact information is listed below.      If I am feeling unsafe or I am in a crisis, I will:   Contact my established care providers   Call the National Suicide Prevention Lifeline: 504.535.7096   Go to the nearest emergency room   Call 911      Warning signs that I or other people might notice when a crisis is developing for me: changes to sleep, appetite or mood, increased anger, agitation or irritability, feeling depressed or hopeless, spending more time alone or talking less, increased crying, decreased productivity, seeing or hearing  things that aren't there, thoughts of not wanting to live anymore or of actually killing myself, thoughts of hurting others     Things I am able to do on my own to cope or help me feel better: watching a favorite tv show or movie, listening to music I enjoy, going outside and breathing fresh air, going for a walk or exercising, taking a shower or bath, a cold or hot beverage, a healthy snack, drawing/coloring/painting, journaling, singing or dancing, deep breathing      I can try practicing square breathing when I begin to feel anxious - inhale through the nose for the count of 4 and the first line on the square. Exhale through the mouth for the count of 4 for the second line of the square. Repeat to complete the square. Repeat the square as many times as needed.     I can also use my five senses to practice mindfulness and grounding. What are five things I can see, four things I can hear, three things I can feel, two things I can smell, and one thing I can taste.      Things that I am able to do with others to cope or help me feel better: sometimes just talking or spending time with someone else, sharing a meal or having coffee, watching a movie or playing a game, going for a walk or exercising     I can also use community resources including mental health hotlines, [facility] crisis teams, or apps.      Things I can use or do for distraction: movies/tv, music, reading, games, drawing/coloring/painting or other art, essential oils, exercise, cleaning/organizing, puzzles, crossword puzzles, word search, Sudoku       I can also download a meditation or relaxation kervin, like Calm, Headspace, or Insight Timer (all three offer a free version)     Changes I can make to support my mental health and wellness: Attend scheduled mental health therapy and psychiatric appointments. Take my medications as prescribed. Maintain a daily schedule/routine. Abstain from all mood altering substances, including drugs, alcohol, or medications  "not currently prescribed to me. Implement a self-care routine.       People in my life that I can ask for help: friends or family, trusted teachers/staff/colleagues, trusted members of my community or place of [employer], mental health crisis lines, or 911     Your [facility] has a mental health crisis team you can call 24/7: [facility] Adult, [phone]     Other things that are important when I m in crisis: to remember that the feelings I am having right now are temporary, and it won't feel like this forever, and that it is okay and important to ask for help     Crisis Lines  Crisis Text Line  Text 343967  You will be connected with a trained live crisis counselor to provide support.     Por espanol, texto  SIRENA a 183224 o texto a 442-AYUDAME en WhatsApp     National Hope Line  1.800.SUICIDE [0525109]        Community Resources  Fast Tracker  Linking people to mental health and substance use disorder resources  fasttrackMirics Semiconductorn.org      [place] Mental Health Warm Line  Peer to peer support  Monday thru Saturday, 12 pm to 10 pm  565.006.1090 or 5.523.418.8703  Text \"Support\" to 95864     National [place] on Mental Illness (MAGY)  249.207.0827 or 1.888.MAGY.HELPS        Mental Health Apps  My3  https://myKTK Grouppp.org/     VirtualHopeBox  https://Respectance.org/apps/virtual-hope-box/        Additional Information  Today you were seen by a licensed mental health professional through Triage and Transition services, [facility] (P)  for a crisis assessment in the Emergency Department at [facility].  It is recommended that you follow up with your established providers (psychiatrist, mental health therapist, and/or primary care doctor - as relevant) as soon as possible. Coordinators from [facility] will be calling you in the next 24-48 hours to ensure that you have the resources you need.  You can also contact [facility] coordinators directly at [phone]. You may have been scheduled for or " offered an appointment with a mental health provider. [facility] maintains an extensive network of licensed behavioral health providers to connect patients with the services they need.  We do not charge providers a fee to participate in our referral network.  We match patients with providers based on a patient's specific needs, insurance coverage, and location.  Our first effort will be to refer you to a provider within your care system, and will utilize providers outside your care system as needed.

## 2023-05-05 NOTE — ED TRIAGE NOTES
Patient refused to get out of ambulance at , demanded to come back to ED. No change in patient's health since discontinue      Triage Assessment     Row Name 05/05/23 4611       Triage Assessment (Adult)    Airway WDL WDL       Respiratory WDL    Respiratory WDL WDL       Skin Circulation/Temperature WDL    Skin Circulation/Temperature WDL WDL       Cardiac WDL    Cardiac WDL WDL       Peripheral/Neurovascular WDL    Peripheral Neurovascular WDL WDL       Cognitive/Neuro/Behavioral WDL    Cognitive/Neuro/Behavioral WDL mood/behavior    Mood/Behavior excitable

## 2023-05-05 NOTE — ED TRIAGE NOTES
Pt went into clinic today, referred to ED for SI.  No changes in meds, VSS.  Pt was biting hand on arrival. Sitter present     Triage Assessment     Row Name 05/05/23 1241       Triage Assessment (Adult)    Airway WDL WDL       Respiratory WDL    Respiratory WDL WDL       Skin Circulation/Temperature WDL    Skin Circulation/Temperature WDL WDL       Cardiac WDL    Cardiac WDL WDL       Peripheral/Neurovascular WDL    Peripheral Neurovascular WDL WDL       Cognitive/Neuro/Behavioral WDL    Cognitive/Neuro/Behavioral WDL X  withdrawn

## 2023-05-05 NOTE — ED NOTES
Patient is angry she cannot stay in the ED and talk to staff. Patient began head banging on the wall. Pillow placed behind head, RN explained to patient that staff will use restraints if needed while waiting for the ambulance.

## 2023-05-05 NOTE — ED NOTES
Attempted to discharge from new encounter, Pt was previously admitted to ED and returned home.  Pt became too aggressive with  staff and was returned.  Staff was originally going to undo discharge from earlier encounter.

## 2023-05-05 NOTE — ED PROVIDER NOTES
ED Provider Note  [facility]      History     Chief Complaint   Patient presents with     Mental Health Problem     Patient wants to be in emergency room     HPI  Sadaf Ross is a 22 year old female with hx of BPD, bipolar disorder, intellectual disability  who was just discharged back to her group and returns again.  Group home staff said that the patient refused to get off of the gurney.  She was yelling and biting herself so EMS brought her back to the ED. EMS personnel said said the patient was happy when she found out she was going back to the ED.  Group home staff anticipates this will be a difficult week with frequent visits to the ED.  They say when the patient wakes up in the morning she starts to plan how she can get back to the ED.  Patient was willing to take her prn ativan before returning to the ED.     Group home phone: [phone].       A complete review of systems was attempted but limited due to patient withdrawn and not wanting to talk.    Physical Exam   BP: 130/79  Pulse: 88  Temp: 98  F (36.7  C)  Resp: 16  SpO2: 98 %  Physical Exam  Vitals and nursing note reviewed.   Constitutional:       General: She is not in acute distress.     Appearance: She is obese. She is not ill-appearing, toxic-appearing or diaphoretic.   HENT:      Head: Normocephalic and atraumatic.      Nose: No congestion or rhinorrhea.   Cardiovascular:      Rate and Rhythm: Normal rate.   Pulmonary:      Effort: Pulmonary effort is normal.   Musculoskeletal:         General: No deformity or signs of injury.      Cervical back: Normal range of motion.   Skin:     Coloration: Skin is not jaundiced or pale.      Comments: Superficial bits marks to both hands.  No bruising or swelling. No skin breakdown.    Neurological:      General: No focal deficit present.      Mental Status: She is alert and oriented to person, place, and time.   Psychiatric:         Attention and Perception: Attention and  perception normal.         Mood and Affect: Affect is labile, angry and tearful.         Behavior: Behavior is uncooperative.         Thought Content: Thought content includes suicidal (chronic) ideation.         Cognition and Memory: Cognition is impaired.         Judgment: Judgment is impulsive and inappropriate.           ED Course, Procedures, & Data      Procedures         Mental Health Risk Assessment      PSS-3    Date and Time Over the past 2 weeks have you felt down, depressed, or hopeless? Over the past 2 weeks have you had thoughts of killing yourself? Have you ever attempted to kill yourself? When did this last happen? User   05/05/23 1626 yes yes yes -- KML      C-SSRS ([place])    Date and Time Q1 Wished to be Dead (Past Month) Q2 Suicidal Thoughts (Past Month) Q3 Suicidal Thought Method Q4 Suicidal Intent without Specific Plan Q5 Suicide Intent with Specific Plan Q6 Suicide Behavior (Lifetime) Within the Past 3 Months? RETIRED: Level of Risk per Screen Screening Not Complete User   05/05/23 1626 yes yes -- yes no yes -- -- -- KML                     No results found for any visits on 05/05/23.  Medications   OLANZapine zydis (zyPREXA) ODT tab 10 mg (10 mg Oral $Given 5/5/23 4320)     Labs Ordered and Resulted from Time of ED Arrival to Time of ED Departure - No data to display  No orders to display          Critical care was not performed.     Medical Decision Making  The patient's presentation was of moderate complexity (a chronic illness mild to moderate exacerbation, progression, or side effect of treatment).    The patient's evaluation involved:  an assessment requiring an independent historian (group home staff)  Numerous prior ED notes with similar presentations reviewed.     The patient's management necessitated moderate risk (prescription drug management including medications given in the ED).      Assessment & Plan    Sadaf Ross is a 22 year old female with hx of BPD, bipolar  disorder, intellectual disability  who was just discharged back to her group and returns again.   Apparently she refused to get off the stretcher and was acting out, biting herself and so paramedics brought her back to the ED.  EMS stated that the patient got very excited and happy when she learned she was being brought back to the group home.  Nursing staff asked the patient to remain in her room with the window closed.  They feel the patient wants to come here to hang out and watch the activity of the ER.  She was screaming and crying after this.  She was offered zyprexa 10 mg PO.  Patient is well known to the ED and presents typical for her ED visits.  I called the group home and they are not answering so I left a message.  Prior admissions have found that admission is not helpful for this patient.  Care plan is to discharge back to her group home. She comes to the ED frequently and we have asked her care team to consider different treatment plan that would stop the unnecessary ED visits and use of ambulance services.  I personally feel she needs a legal guardian. I was told by her group home staff that they asked her if she wanted one in the past but she didn't want a guardian.  I feel her behavior and abuse of the health system indicates she does need a legal guardian.  Patient received zyprexa 10 mg po and is now sleepy and is ready to go back to her group home. She is at baseline and group home staff agrees with return to the group home.       I have reviewed the nursing notes. I have reviewed the findings, diagnosis, plan and need for follow up with the patient.    New Prescriptions    No medications on file       Final diagnoses:   Borderline personality disorder (H)   Intellectual disability       Ashely Toth MD  [facility] EMERGENCY DEPARTMENT  5/5/2023     Ashely Toth MD  05/05/23 1913

## 2023-05-05 NOTE — ED NOTES
Bed: ED10  Expected date:   Expected time:   Means of arrival:   Comments:  [facility] 22 F return from  -Clean room

## 2023-05-05 NOTE — DISCHARGE INSTRUCTIONS
Discharge back to [facility] home via ambulance.     Continue working with your current providers.

## 2023-05-05 NOTE — ED NOTES
Patient refused to get out of EMS when brought back to group home. EMS stated that patient got very excited and happy when learning she was going to be brought back to the ED and even agreed to take her morning medications. RN explained to Kathia upon arrival that she would be staying in her room, not watching the other ED patients and staff. Sadaf became very angry with RN,throwing her  bag and shoes on the floor and screaming. Patient has since calmed down, 1-1 staff in place.

## 2023-05-05 NOTE — DISCHARGE INSTRUCTIONS
Aftercare Plan     Follow up with established providers and supports as scheduled. Continue taking medications as prescribed. Abstain from drugs and alcohol. Utilize your [facility] mental health crisis team as needed. They are available 24/7. Contact information is listed below.      If I am feeling unsafe or I am in a crisis, I will:   Contact my established care providers   Call the [place] Suicide Prevention Lifeline: 813.293.6968   Go to the nearest emergency room   Call 911      Warning signs that I or other people might notice when a crisis is developing for me: changes to sleep, appetite or mood, increased anger, agitation or irritability, feeling depressed or hopeless, spending more time alone or talking less, increased crying, decreased productivity, seeing or hearing things that aren't there, thoughts of not wanting to live anymore or of actually killing myself, thoughts of hurting others     Things I am able to do on my own to cope or help me feel better: watching a favorite tv show or movie, listening to music I enjoy, going outside and breathing fresh air, going for a walk or exercising, taking a shower or bath, a cold or hot beverage, a healthy snack, drawing/coloring/painting, journaling, singing or dancing, deep breathing      I can try practicing square breathing when I begin to feel anxious - inhale through the nose for the count of 4 and the first line on the square. Exhale through the mouth for the count of 4 for the second line of the square. Repeat to complete the square. Repeat the square as many times as needed.     I can also use my five senses to practice mindfulness and grounding. What are five things I can see, four things I can hear, three things I can feel, two things I can smell, and one thing I can taste.      Things that I am able to do with others to cope or help me feel better: sometimes just talking or spending time with someone else, sharing a meal or having coffee, watching a  "movie or playing a game, going for a walk or exercising     I can also use community resources including mental health hotlines, [facility] crisis teams, or apps.      Things I can use or do for distraction: movies/tv, music, reading, games, drawing/coloring/painting or other art, essential oils, exercise, cleaning/organizing, puzzles, crossword puzzles, word search, Sudoku       I can also download a meditation or relaxation kervin, like Calm, Headspace, or Insight Timer (all three offer a free version)     Changes I can make to support my mental health and wellness: Attend scheduled mental health therapy and psychiatric appointments. Take my medications as prescribed. Maintain a daily schedule/routine. Abstain from all mood altering substances, including drugs, alcohol, or medications not currently prescribed to me. Implement a self-care routine.       People in my life that I can ask for help: friends or family, trusted teachers/staff/colleagues, trusted members of my community or place of [place], mental health crisis lines, or 911     Your [facility] has a mental health crisis team you can call 24/7: [facility] Adult, [phone]     Other things that are important when I m in crisis: to remember that the feelings I am having right now are temporary, and it won't feel like this forever, and that it is okay and important to ask for help     Crisis Lines  Crisis Text Line  Text 185256  You will be connected with a trained live crisis counselor to provide support.     Por espanol, texto  SIRENA a 576888 o texto a 442-AYUDAME en [facility] Hope Line  1.800.SUICIDE [2412357]        Community Resources  Fast Tracker  Linking people to mental health and substance use disorder resources  fasttrackermn.org      [place] Mental Health Warm Line  Peer to peer support  Monday thru Saturday, 12 pm to 10 pm  176.871.3339 or 1.315.932.4887  Text \"Support\" to 67308     [place] on Mental Illness (MAGY) "  025.266.8279 or 1.888.MAGY.HELPS        Mental Health Apps  My3  https://Qraniopp.org/     VirtualHopeBox  https://Epoch/apps/virtual-hope-box/        Additional Information  Today you were seen by a licensed mental health professional through Triage and Transition services, [facility] (P)  for a crisis assessment in the Emergency Department at [facility].  It is recommended that you follow up with your established providers (psychiatrist, mental health therapist, and/or primary care doctor - as relevant) as soon as possible. Coordinators from [facility] will be calling you in the next 24-48 hours to ensure that you have the resources you need.  You can also contact [facility] coordinators directly at [phone]. You may have been scheduled for or offered an appointment with a mental health provider. [facility] maintains an extensive network of licensed behavioral health providers to connect patients with the services they need.  We do not charge providers a fee to participate in our referral network.  We match patients with providers based on a patient's specific needs, insurance coverage, and location.  Our first effort will be to refer you to a provider within your care system, and will utilize providers outside your care system as needed.

## 2023-05-07 ENCOUNTER — TELEPHONE (OUTPATIENT)
Dept: NURSING | Facility: CLINIC | Age: 23
End: 2023-05-07
Payer: MEDICARE

## 2023-05-07 ENCOUNTER — NURSE TRIAGE (OUTPATIENT)
Dept: NURSING | Facility: CLINIC | Age: 23
End: 2023-05-07
Payer: MEDICARE

## 2023-05-07 NOTE — TELEPHONE ENCOUNTER
"Patient calling with concerns of depression and self injury. Patient has been biting her right hand. No bleeding.   Patient has a therapist that she sees on Wednesdays.   Patient lives in a group home and feels supportive by the people there. Patient was trying to kill herself by banging her head on the rocking chair.   Patient denies any injuries \"yet\".  Asked patient if she is able to contract with me to not harm herself until she can speak with her therapist or see her doctor. Patient said no, she is not able to keep herself safe.   Protocol recommends 911.   Offered to call 911 for patient but she said she could call.   Confirmed with patient that she was confident in call 911 and she said she was able to do this.   Informed that I will call back in a few minutes to make sure she was able to reach 911.  Return call to patient. Patient reports she called 911 but was told that the EMS canceled the request stating they would not be coming to  patient. Asked patient if she could speak with group home staff and ask for assistance in staying safe and not harming yourself. Patient stated she will do this.     Devi Arteaga RN   05/07/23 3:10 PM  [facility] Nurse Advisor      Reason for Disposition    Suicide thoughts, threats, attempts, or questions    Patient is threatening suicide now    Additional Information    Negative: Patient attempted suicide    Negative: Patient is threatening suicide now    Negative: Violent behavior, or threatening to physically hurt or kill someone    Negative: [1] Patient is very confused (disoriented, slurred speech) AND [2] no other adult (e.g., friend or family member) available    Negative: [1] Difficult to awaken or acting very confused (disoriented, slurred speech) AND [2] new-onset    Negative: Sounds like a life-threatening emergency to the triager    Negative: Patient attempted suicide    Protocols used: DEPRESSION-A-AH, SUICIDE [id number]      "

## 2023-05-07 NOTE — TELEPHONE ENCOUNTER
Telephone call  Patient calling back she states she was called and was calling back.  She has depression.  She states she has thoughts of hurting herself but she thinks she will be ok until she sees Dr tomorrow.  She stated she would call us first before hurting herself.  The EMS did not take her in.      Nan Saucedo RN   [facility] Nurse Advisor  3:46 PM 5/7/2023

## 2023-05-08 ENCOUNTER — HOSPITAL ENCOUNTER (EMERGENCY)
Facility: CLINIC | Age: 23
Discharge: HOME | End: 2023-05-09
Attending: FAMILY MEDICINE | Admitting: FAMILY MEDICINE
Payer: MEDICARE

## 2023-05-08 ENCOUNTER — MEDICAL CORRESPONDENCE (OUTPATIENT)
Dept: HEALTH INFORMATION MANAGEMENT | Facility: CLINIC | Age: 23
End: 2023-05-08

## 2023-05-08 VITALS
RESPIRATION RATE: 18 BRPM | DIASTOLIC BLOOD PRESSURE: 82 MMHG | BODY MASS INDEX: 42.52 KG/M2 | WEIGHT: 240 LBS | TEMPERATURE: 98.6 F | HEIGHT: 63 IN | HEART RATE: 83 BPM | SYSTOLIC BLOOD PRESSURE: 121 MMHG | OXYGEN SATURATION: 100 %

## 2023-05-08 DIAGNOSIS — Z72.89 SELF-INJURIOUS BEHAVIOR: ICD-10-CM

## 2023-05-08 DIAGNOSIS — F79 INTELLECTUAL DISABILITY: ICD-10-CM

## 2023-05-08 DIAGNOSIS — F60.3 BORDERLINE PERSONALITY DISORDER (H): ICD-10-CM

## 2023-05-08 DIAGNOSIS — R45.851 SUICIDAL IDEATION: ICD-10-CM

## 2023-05-08 PROCEDURE — 99285 EMERGENCY DEPT VISIT HI MDM: CPT | Mod: 25

## 2023-05-08 PROCEDURE — 90791 PSYCH DIAGNOSTIC EVALUATION: CPT

## 2023-05-08 PROCEDURE — 250N000013 HC RX MED GY IP 250 OP 250 PS 637: Performed by: PSYCHIATRY & NEUROLOGY

## 2023-05-08 PROCEDURE — 99285 EMERGENCY DEPT VISIT HI MDM: CPT | Performed by: PSYCHIATRY & NEUROLOGY

## 2023-05-08 PROCEDURE — 94640 AIRWAY INHALATION TREATMENT: CPT

## 2023-05-08 RX ORDER — HYDROXYZINE HYDROCHLORIDE 25 MG/1
50 TABLET, FILM COATED ORAL 3 TIMES DAILY PRN
Status: DISCONTINUED | OUTPATIENT
Start: 2023-05-08 | End: 2023-05-09 | Stop reason: HOSPADM

## 2023-05-08 RX ORDER — ALBUTEROL SULFATE 90 UG/1
2 AEROSOL, METERED RESPIRATORY (INHALATION) EVERY 6 HOURS PRN
Status: DISCONTINUED | OUTPATIENT
Start: 2023-05-08 | End: 2023-05-09 | Stop reason: HOSPADM

## 2023-05-08 RX ORDER — OLANZAPINE 5 MG/1
5 TABLET, ORALLY DISINTEGRATING ORAL 3 TIMES DAILY PRN
Status: DISCONTINUED | OUTPATIENT
Start: 2023-05-08 | End: 2023-05-09 | Stop reason: HOSPADM

## 2023-05-08 RX ADMIN — OLANZAPINE 5 MG: 5 TABLET, ORALLY DISINTEGRATING ORAL at 22:31

## 2023-05-08 RX ADMIN — HYDROXYZINE HYDROCHLORIDE 50 MG: 25 TABLET, FILM COATED ORAL at 22:31

## 2023-05-08 RX ADMIN — ALBUTEROL SULFATE 2 PUFF: 90 AEROSOL, METERED RESPIRATORY (INHALATION) at 22:29

## 2023-05-08 ASSESSMENT — ACTIVITIES OF DAILY LIVING (ADL)
ADLS_ACUITY_SCORE: 37

## 2023-05-08 ASSESSMENT — COLUMBIA-SUICIDE SEVERITY RATING SCALE - C-SSRS
SUICIDE, SINCE LAST CONTACT: NO
TOTAL  NUMBER OF INTERRUPTED ATTEMPTS SINCE LAST CONTACT: NO
1. SINCE LAST CONTACT, HAVE YOU WISHED YOU WERE DEAD OR WISHED YOU COULD GO TO SLEEP AND NOT WAKE UP?: YES
TOTAL  NUMBER OF ACTUAL ATTEMPTS SINCE LAST CONTACT: 1
LETHALITY/MEDICAL DAMAGE CODE MOST LETHAL ACTUAL ATTEMPT: NO PHYSICAL DAMAGE OR VERY MINOR PHYSICAL DAMAGE
2. HAVE YOU ACTUALLY HAD ANY THOUGHTS OF KILLING YOURSELF?: YES
TOTAL  NUMBER OF ABORTED OR SELF INTERRUPTED ATTEMPTS SINCE LAST CONTACT: 1
ATTEMPT SINCE LAST CONTACT: YES
6. HAVE YOU EVER DONE ANYTHING, STARTED TO DO ANYTHING, OR PREPARED TO DO ANYTHING TO END YOUR LIFE?: NO
LETHALITY/MEDICAL DAMAGE CODE MOST LETHAL POTENTIAL ATTEMPT: BEHAVIOR NOT LIKELY TO RESULT IN INJURY
TOTAL  NUMBER OF ABORTED OR SELF INTERRUPTED ATTEMPTS SINCE LAST CONTACT: YES
REASONS FOR IDEATION SINCE LAST CONTACT: EQUALLY TO GET ATTENTION, REVENGE, OR A REACTION FROM OTHERS AND TO END/STOP THE PAIN
5. HAVE YOU STARTED TO WORK OUT OR WORKED OUT THE DETAILS OF HOW TO KILL YOURSELF? DO YOU INTEND TO CARRY OUT THIS PLAN?: YES

## 2023-05-08 NOTE — ED PROVIDER NOTES
ED Provider Note  [facility]      History     Chief Complaint   Patient presents with     Suicidal     Pt went into clinic today, referred to ED for SI.  No changes in meds, VSS.     HPI  Sadaf Ross is a 22 year old female with hx of BPD, bipolar disorder, intellectual disability who comes to the ED from clinic due to suicidal ideation. She apparently told them at the clinic that she was suicidal so they sent her here. She has chronic si.   She lives at a group home.  Group home staff state that her care team had a meeting this past week and they told her they felt she needed to be admitted for 30 days.  She has since been making plans on how she can get to the hospital.  Staff say she will wake up in the morning and immediately start planning on coming to the ED. She is at her baseline per staff. The patient says she wants to kill herself.  She has chronic si.  She is her own legal guardian.  The group home staff states her care team has been talking about getting her testing so they can petition the courts for a legal guardian for her.         A complete review of systems was performed with pertinent positives and negatives noted in the HPI, and all other systems negative.    Physical Exam   BP: (!) 145/94  Pulse: 98  Temp: 99.6  F (37.6  C)  Resp: 16  SpO2: 97 %  Physical Exam      ED Course, Procedures, & Data      Procedures         Mental Health Risk Assessment      PSS-3    Date and Time Over the past 2 weeks have you felt down, depressed, or hopeless? Over the past 2 weeks have you had thoughts of killing yourself? Have you ever attempted to kill yourself? When did this last happen? User   05/05/23 1243 yes yes yes within the last 24 hours (including today) RB      C-SSRS ([place])    Date and Time Q1 Wished to be Dead (Past Month) Q2 Suicidal Thoughts (Past Month) Q3 Suicidal Thought Method Q4 Suicidal Intent without Specific Plan Q5 Suicide Intent with Specific Plan Q6  Suicide Behavior (Lifetime) Within the Past 3 Months? RETIRED: Level of Risk per Screen Screening Not Complete User   05/05/23 1309 yes yes yes yes yes yes -- -- -- KML   05/05/23 1243 yes yes yes yes yes yes -- -- -- RB                Item Assessment   Suicidal Ideation chronic   Plan Beat her head in   Intent Baseline/chronic   Suicidal or self-harm behaviors none   Risk Factors Previous psychiatric diagnosis and treatments   Protective Factors she likes her group home staff/family support              No results found for any visits on 05/05/23.  Medications - No data to display  Labs Ordered and Resulted from Time of ED Arrival to Time of ED Departure - No data to display  No orders to display          Critical care was not performed.     Medical Decision Making  The patient's presentation was of high complexity (an acute health issue posing potential threat to life or bodily function).    The patient's evaluation involved:  an assessment requiring an independent historian (group home staff)  review of external note(s) from 3+ sources (recent ed visit and prior ed visits. patient here very frequently)    The patient's management necessitated moderate risk (limitations due to social determinants of health (see separate area of note for details)) and high risk (a decision regarding hospitalization).      Assessment & Plan    Sadaf Ross is a 22 year old female with hx of BPD, bipolar disorder, intellectual disability who comes to the ED from clinic due to suicidal ideation. She is well known to the ED and presents with suicidal each time.  The extended care team has been in contact with her care team due to her excessive ED visits and overuse of ERs/ambulance services.  She presents at baseline and was discharged back to her group home.  Group home staff agrees with discharge back.  Her care team meeting this past week stating she needed a 30 day admission has likely worsened her secondary gain to want to be  in the ED.  Prior hospital admissions were found to not be of benefit given this is behavioral.  I personally feel her care team needs to assign her a legal guardian and perhaps have more staff on sight with her at her group home so she is not calling 911 almost daily.     I have reviewed the nursing notes. I have reviewed the findings, diagnosis, plan and need for follow up with the patient.    Discharge Medication List as of 5/5/2023  2:11 PM          Final diagnoses:   Borderline personality disorder (H)   Intellectual disability       Ashely Toth MD  [facility] EMERGENCY DEPARTMENT  5/5/2023     Ashely Toth MD  05/07/23 9934

## 2023-05-08 NOTE — ED TRIAGE NOTES
Triage Assessment     Row Name 05/08/23 1728       Triage Assessment (Adult)    Airway WDL WDL       Respiratory WDL    Respiratory WDL WDL       Skin Circulation/Temperature WDL    Skin Circulation/Temperature WDL WDL       Cardiac WDL    Cardiac WDL WDL       Peripheral/Neurovascular WDL    Peripheral Neurovascular WDL WDL       Cognitive/Neuro/Behavioral WDL    Cognitive/Neuro/Behavioral WDL WDL            SI with the plan of hitting head on the wall.

## 2023-05-08 NOTE — ED PROVIDER NOTES
[facility] EMERGENCY DEPARTMENT ([facility])    5/08/23           History     Chief Complaint   Patient presents with     Suicidal     HPI  Sadaf Ross is a 22 year old female who with hx of ADHD, BPD, bipolar disorder,and intellectual disability who comes to the ED from clinic due to suicidal ideation. Patient is well-known to us due to her frequent ED visits. Review of her previous ED visits according to Epic show 25 in 2020, 60 in 2021, 77 in 2022, and 38 so far in 2023. She was last hospitalized in 2021. It is documented repeatedly that admission is non-beneficial and only serves as enabling her maladaptive behavior. Despite this, patient was recently referred by her outpatient treatment team for a 30 day inpatient hospital stay. She did not warrant an acute hospital stay at the time due to lack of acute safety concern.    Patient returns today as she was feeling suicidal. She knows the providers here well and refuses to engage.     Patient is laying in her chair in the hallway. She is not exhibiting psychosis, nor is emotionally dysregulated, nor appears altered or impaired.      Past Medical History  Past Medical History:   Diagnosis Date     ADHD (attention deficit hyperactivity disorder)      Bipolar 1 disorder (H)      Borderline personality disorder (H)      Depression      Depressive disorder      Intellectual disability      Obesity      Syncope      Past Surgical History:   Procedure Laterality Date     APPENDECTOMY       APPENDECTOMY       acetaminophen (TYLENOL) 325 MG tablet  albuterol (PROAIR HFA/PROVENTIL HFA/VENTOLIN HFA) 108 (90 Base) MCG/ACT inhaler  alum & mag hydroxide-simethicone (MAALOX  ES) 400-400-40 MG/5ML SUSP suspension  ARIPiprazole lauroxil ER (ARISTADA) 882 MG/3.2ML intra-muscular  benzonatate (TESSALON) 200 MG capsule  benztropine (COGENTIN) 1 MG tablet  bisacodyl (DULCOLAX) 5 MG EC tablet  calcium carbonate (TUMS) 500 MG chewable tablet  clobetasol (TEMOVATE) 0.05 %  CREA cream  cyclobenzaprine (FLEXERIL) 10 MG tablet  diclofenac (VOLTAREN) 1 % topical gel  docusate sodium (COLACE) 50 MG capsule  fluticasone (FLONASE) 50 MCG/ACT nasal spray  guaiFENesin (MUCINEX) 600 MG 12 hr tablet  hydrocortisone (CORTAID) 0.5 % external cream  hydrOXYzine (ATARAX) 50 MG tablet  hyoscyamine (LEVSIN/SL) 0.125 MG sublingual tablet  ibuprofen (ADVIL/MOTRIN) 400 MG tablet  loperamide (IMODIUM) 2 MG capsule  loratadine (CLARITIN) 10 MG tablet  LORazepam (ATIVAN) 0.5 MG tablet  multivitamin, therapeutic (THERA-VIT) TABS tablet  OLANZapine zydis (ZYPREXA) 5 MG ODT  omeprazole (PRILOSEC) 40 MG DR capsule  ondansetron (ZOFRAN) 4 MG tablet  ondansetron (ZOFRAN) 4 MG tablet  polyethylene glycol (MIRALAX) 17 GM/Dose powder  prochlorperazine (COMPAZINE) 10 MG tablet  promethazine (PHENERGAN) 12.5 MG tablet  sennosides (SENOKOT) 8.6 MG tablet  traZODone (DESYREL) 50 MG tablet  venlafaxine (EFFEXOR-ER) 225 MG 24 hr tablet  Vitamin D, Cholecalciferol, 25 MCG (1000 UT) TABS      Allergies   Allergen Reactions     Penicillins Rash and Unknown     Family History  Family History   Problem Relation Age of Onset     Diabetes Type 1 Father      Cancer Paternal Grandfather      Social History   Social History     Tobacco Use     Smoking status: Every Day     Packs/day: 1.00     Years: 5.00     Pack years: 5.00     Types: Vaping Device, Cigarettes     Smokeless tobacco: Never   Substance Use Topics     Alcohol use: No     Drug use: No      Past medical history, past surgical history, medications, allergies, family history, and social history were reviewed with the patient. No additional pertinent items.      A complete review of systems was performed with pertinent positives and negatives noted in the HPI, and all other systems negative.    Physical Exam   BP: 119/79  Pulse: 101  Temp: 99.2  F (37.3  C)  Resp: 18  SpO2: 98 %  Physical Exam  Vitals and nursing note reviewed.   HENT:      Head: Normocephalic.   Eyes:       Pupils: Pupils are equal, round, and reactive to light.   Pulmonary:      Effort: Pulmonary effort is normal.   Musculoskeletal:         General: Normal range of motion.      Cervical back: Normal range of motion.   Neurological:      General: No focal deficit present.      Mental Status: She is alert.   Psychiatric:         Attention and Perception: Attention and perception normal. She does not perceive auditory or visual hallucinations.         Mood and Affect: Mood normal. Affect is blunt.         Speech: Speech normal.         Behavior: Behavior normal. Behavior is not agitated, aggressive, hyperactive or combative. Behavior is cooperative.         Thought Content: Thought content normal. Thought content is not paranoid or delusional. Thought content does not include homicidal ideation.         Cognition and Memory: Cognition and memory normal.         Judgment: Judgment is impulsive and inappropriate.           ED Course, Procedures, & Data      Procedures       ED Course Selections: A consult was attained from the  DEC service. The case was discussed with the  from that service. The consulting service's recommendations were provided at 10:30 PM. 10 minutes spent discussing case, care and disposition.    20 minutes reviewing prior records and interventions, including recent recommendations.      Mental Health Risk Assessment      PSS-3    Date and Time Over the past 2 weeks have you felt down, depressed, or hopeless? Over the past 2 weeks have you had thoughts of killing yourself? Have you ever attempted to kill yourself? When did this last happen? User   05/08/23 1722 yes yes yes -- ZM      C-SSRS (Globe)    Date and Time Q1 Wished to be Dead (Past Month) Q2 Suicidal Thoughts (Past Month) Q3 Suicidal Thought Method Q4 Suicidal Intent without Specific Plan Q5 Suicide Intent with Specific Plan Q6 Suicide Behavior (Lifetime) Within the Past 3 Months? RETIRED: Level of Risk per Screen Screening Not  "Complete User   05/08/23 1722 yes yes -- yes yes no -- -- -- ZM              Suicide assessment completed by mental health (D.E.C., LCSW, etc.)       No results found for any visits on 05/08/23.  Medications - No data to display  Labs Ordered and Resulted from Time of ED Arrival to Time of ED Departure - No data to display  No orders to display          Critical care was not performed.     Medical Decision Making  The patient's presentation was of high complexity (a chronic illness severe exacerbation, progression, or side effect of treatment).    The patient's evaluation involved:  an assessment requiring an independent historian (see separate area of note for details)  review of external note(s) from 2 sources (see separate area of note for details)    The patient's management necessitated moderate risk (limitations due to social determinants of health (see separate area of note for details)) and high risk (a decision regarding hospitalization).      Assessment & Plan    Patient is here for a mental health assessment. She comes in as she is feeling suicidal.    Borderline Personality Disorder. Patient has chronic low distress tolerance and reacts maladaptively through self-injury and threatening suicide. She appears at baseline.    Intellectual Disability. Her low functioning status is a barrier to her ability to successfully overcome her distress and develop adequate resilience.    Patient's current community supportive services appear rather inadequate. She would benefit from long-term residential treatment but it is NOT something that an inpatient hospital stay can provide. A \"30 day inpatient stay\" does not exist. Patient would be better suited for an adult intensive residential intensive treatment program (IRTS) such as through [facility] that can provide up to 90 days of treatment. The  offered this service which patient promptly declined, citing a bad experience with a previous IRTS. She would " prefer to return to her group home.    Decision making ability. Patient is her own guardian. She would benefit from a county assigned guardian to help in getting her into appropriate care and treatment. This needs to be pursued in the community, and preferably with support from her mother/family. She is not holdable nor committable. Patient will be discharged back to her group home.    I have reviewed the nursing notes. I have reviewed the findings, diagnosis, plan and need for follow up with the patient.    New Prescriptions    No medications on file       Final diagnoses:   Intellectual disability   Borderline personality disorder (H)   Self-injurious behavior       Magno Sena MD  [facility] EMERGENCY DEPARTMENT  5/8/2023     Magno Sena MD  05/08/23 7244       Magno Sena MD  05/08/23 3245

## 2023-05-09 ENCOUNTER — NURSE TRIAGE (OUTPATIENT)
Dept: NURSING | Facility: CLINIC | Age: 23
End: 2023-05-09
Payer: MEDICARE

## 2023-05-09 ENCOUNTER — HOSPITAL ENCOUNTER (EMERGENCY)
Facility: CLINIC | Age: 23
Discharge: HOME | End: 2023-05-09
Attending: PSYCHIATRY & NEUROLOGY | Admitting: PSYCHIATRY & NEUROLOGY
Payer: MEDICARE

## 2023-05-09 ENCOUNTER — MEDICAL CORRESPONDENCE (OUTPATIENT)
Dept: EMERGENCY MEDICINE | Facility: CLINIC | Age: 23
End: 2023-05-09

## 2023-05-09 VITALS
DIASTOLIC BLOOD PRESSURE: 89 MMHG | RESPIRATION RATE: 16 BRPM | HEART RATE: 119 BPM | OXYGEN SATURATION: 96 % | SYSTOLIC BLOOD PRESSURE: 133 MMHG

## 2023-05-09 DIAGNOSIS — F79 INTELLECTUAL DISABILITY: ICD-10-CM

## 2023-05-09 DIAGNOSIS — F60.3 BORDERLINE PERSONALITY DISORDER (H): ICD-10-CM

## 2023-05-09 PROCEDURE — 99284 EMERGENCY DEPT VISIT MOD MDM: CPT | Performed by: PSYCHIATRY & NEUROLOGY

## 2023-05-09 PROCEDURE — 99285 EMERGENCY DEPT VISIT HI MDM: CPT | Performed by: PSYCHIATRY & NEUROLOGY

## 2023-05-09 ASSESSMENT — ACTIVITIES OF DAILY LIVING (ADL)
ADLS_ACUITY_SCORE: 37
ADLS_ACUITY_SCORE: 37

## 2023-05-09 NOTE — ED PROVIDER NOTES
[facility] EMERGENCY DEPARTMENT ([facility])  23  ED14  History     Chief Complaint   Patient presents with     Suicidal     SIB/SI Plan was bang her head on the wall until she .     HPI  Sadaf Ross is a 22 year old female with a past medical history significant for ADHD, BPD, bipolar disorder, intellectual disability and frequent ED visits who presents to the Emergency Department for evaluation of behavioral and suicidal threat concerns. Patient is well-known to us and was just seen yesterday. There has been recent effort by her provider through [employer] (Tiffanie Jacobson) to get her committed, placed in long-term residential treatment and to get her a legal guardian. Patient's outpatient team is in process of filing to get a legal guardian and commitment. Patient was offered a referral to an IRTS yesterday which she declined. She wanted to go home and was discharged per her request. She appeared at baseline.     Her provider sent her back to the ED today. I had an extended discussion with the provider about our involvement in caring for Sadaf and what can be done in the ED setting, in inpatient setting and outpatient setting. I told the provider of our offer for a referral to an IRTS but that Sadaf refused and without her cooperation and buy-in with the treatment, that she would be a poor fit and quickly be discharged once she starts to act out. Perhaps pursuing legal guardianship would be helpful as the legal guardian can place her in long-term treatment, and encourage to process to continue.    Patient here is apologetic and has been calm since she was placed in ED 14. She declined a referral to an IRTS and wants to go home.      Past Medical History  Past Medical History:   Diagnosis Date     ADHD (attention deficit hyperactivity disorder)      Bipolar 1 disorder (H)      Borderline personality disorder (H)      Depression      Depressive disorder      Intellectual disability      Obesity       Syncope      Past Surgical History:   Procedure Laterality Date     APPENDECTOMY       APPENDECTOMY       acetaminophen (TYLENOL) 325 MG tablet  albuterol (PROAIR HFA/PROVENTIL HFA/VENTOLIN HFA) 108 (90 Base) MCG/ACT inhaler  alum & mag hydroxide-simethicone (MAALOX  ES) 400-400-40 MG/5ML SUSP suspension  ARIPiprazole lauroxil ER (ARISTADA) 882 MG/3.2ML intra-muscular  benzonatate (TESSALON) 200 MG capsule  benztropine (COGENTIN) 1 MG tablet  bisacodyl (DULCOLAX) 5 MG EC tablet  calcium carbonate (TUMS) 500 MG chewable tablet  clobetasol (TEMOVATE) 0.05 % CREA cream  cyclobenzaprine (FLEXERIL) 10 MG tablet  diclofenac (VOLTAREN) 1 % topical gel  docusate sodium (COLACE) 50 MG capsule  fluticasone (FLONASE) 50 MCG/ACT nasal spray  guaiFENesin (MUCINEX) 600 MG 12 hr tablet  hydrocortisone (CORTAID) 0.5 % external cream  hydrOXYzine (ATARAX) 50 MG tablet  hyoscyamine (LEVSIN/SL) 0.125 MG sublingual tablet  ibuprofen (ADVIL/MOTRIN) 400 MG tablet  loperamide (IMODIUM) 2 MG capsule  loratadine (CLARITIN) 10 MG tablet  LORazepam (ATIVAN) 0.5 MG tablet  multivitamin, therapeutic (THERA-VIT) TABS tablet  OLANZapine zydis (ZYPREXA) 5 MG ODT  omeprazole (PRILOSEC) 40 MG DR capsule  ondansetron (ZOFRAN) 4 MG tablet  ondansetron (ZOFRAN) 4 MG tablet  polyethylene glycol (MIRALAX) 17 GM/Dose powder  prochlorperazine (COMPAZINE) 10 MG tablet  promethazine (PHENERGAN) 12.5 MG tablet  sennosides (SENOKOT) 8.6 MG tablet  traZODone (DESYREL) 50 MG tablet  venlafaxine (EFFEXOR-ER) 225 MG 24 hr tablet  Vitamin D, Cholecalciferol, 25 MCG (1000 UT) TABS      Allergies   Allergen Reactions     Penicillins Rash and Unknown     Family History  Family History   Problem Relation Age of Onset     Diabetes Type 1 Father      Cancer Paternal Grandfather      Social History   Social History     Tobacco Use     Smoking status: Every Day     Packs/day: 1.00     Years: 5.00     Pack years: 5.00     Types: Vaping Device, Cigarettes     Smokeless  tobacco: Never   Substance Use Topics     Alcohol use: No     Drug use: No         A medically appropriate review of systems was performed with pertinent positives and negatives noted in the HPI, and all other systems negative.    Physical Exam   BP: 133/89  Pulse: 119  Resp: 16  SpO2: 96 %  Physical Exam  Vitals and nursing note reviewed.   HENT:      Head: Normocephalic.   Eyes:      Pupils: Pupils are equal, round, and reactive to light.   Pulmonary:      Effort: Pulmonary effort is normal.   Musculoskeletal:         General: Normal range of motion.      Cervical back: Normal range of motion.   Neurological:      General: No focal deficit present.      Mental Status: She is alert.   Psychiatric:         Attention and Perception: Attention and perception normal.         Mood and Affect: Mood and affect normal.         Speech: Speech normal.         Behavior: Behavior normal. Behavior is not agitated, aggressive, hyperactive or combative. Behavior is cooperative.         Thought Content: Thought content normal. Thought content is not paranoid or delusional. Thought content does not include homicidal or suicidal ideation.         Cognition and Memory: Cognition and memory normal.         Judgment: Judgment normal.         ED Course, Procedures, & Data      Procedures       Mental Health Risk Assessment      PSS-3    Date and Time Over the past 2 weeks have you felt down, depressed, or hopeless? Over the past 2 weeks have you had thoughts of killing yourself? Have you ever attempted to kill yourself? When did this last happen? User   05/09/23 1406 no no yes -- LM               No results found for any visits on 05/09/23.  Medications - No data to display  Labs Ordered and Resulted from Time of ED Arrival to Time of ED Departure - No data to display  No orders to display          Critical care was not performed.     Medical Decision Making  The patient's presentation was of moderate complexity (a chronic illness mild  to moderate exacerbation, progression, or side effect of treatment).    The patient's evaluation involved:  review of external note(s) from 3+ sources (see separate area of note for details)    The patient's management necessitated moderate risk (limitations due to social determinants of health (see separate area of note for details)) and high risk (a decision regarding hospitalization).      Assessment & Plan    Patient is here for a mental health assessment. She was sent in by her psychiatric provider with recommendation for long-term inpatient placement and commitment. This is not possible at this time and I spent 15 minutes discussing the care and recommended plan from the ED standpoint. The provider appreciated the discussion and will continue to pursue commitment and legal guardianship in the community.    Patient wants to return to her group home. She appears at baseline and has been calm and cooperative here. She declined an offer of IRTS referral. I do not find her holdable. She can be discharged.    I have reviewed the nursing notes. I have reviewed the findings, diagnosis, plan and need for follow up with the patient.    New Prescriptions    No medications on file       Final diagnoses:   Borderline personality disorder (H)   Intellectual disability       Magno Sena MD  [facility] EMERGENCY DEPARTMENT  5/9/2023     Magno Sena MD  05/09/23 1523

## 2023-05-09 NOTE — DISCHARGE INSTRUCTIONS
Aftercare Plan     Follow up with established providers and supports as scheduled. Continue taking medications as prescribed. Abstain from drugs and alcohol. Utilize your [facility] mental health crisis team as needed. They are available 24/7. Contact information is listed below.     Primary Care Provider: Jess Wilkins MD - [facility]    Psychiatrist: Emma Jacobson NP - [place]    Therapist: Rd Barton MA, [facility] - [facility]    Trauma therapist: Lynne Alexander    : [place] [phone]     If I am feeling unsafe or I am in a crisis, I will:   Contact my established care providers   Call the National Suicide Prevention Lifeline: 926.861.7612   Go to the nearest emergency room   Call 91      Warning signs that I or other people might notice when a crisis is developing for me: changes to sleep, appetite or mood, increased anger, agitation or irritability, feeling depressed or hopeless, spending more time alone or talking less, increased crying, decreased productivity, seeing or hearing things that aren't there, thoughts of not wanting to live anymore or of actually killing myself, thoughts of hurting others     Things I am able to do on my own to cope or help me feel better: watching a favorite tv show or movie, listening to music I enjoy, going outside and breathing fresh air, going for a walk or exercising, taking a shower or bath, a cold or hot beverage, a healthy snack, drawing/coloring/painting, journaling, singing or dancing, deep breathing      I can try practicing square breathing when I begin to feel anxious - inhale through the nose for the count of 4 and the first line on the square. Exhale through the mouth for the count of 4 for the second line of the square. Repeat to complete the square. Repeat the square as many times as needed.     I can also use my five senses to practice mindfulness and grounding. What are five things I can see, four things I can hear, three things I can feel, two  things I can smell, and one thing I can taste.      Things that I am able to do with others to cope or help me feel better: sometimes just talking or spending time with someone else, sharing a meal or having coffee, watching a movie or playing a game, going for a walk or exercising     I can also use community resources including mental health hotlines, [facility] crisis teams, or apps.      Things I can use or do for distraction: movies/tv, music, reading, games, drawing/coloring/painting or other art, essential oils, exercise, cleaning/organizing, puzzles, crossword puzzles, word search, Sudoku       I can also download a meditation or relaxation kervin, like Calm, Headspace, or Insight Timer (all three offer a free version)     Changes I can make to support my mental health and wellness: Attend scheduled mental health therapy and psychiatric appointments. Take my medications as prescribed. Maintain a daily schedule/routine. Abstain from all mood altering substances, including drugs, alcohol, or medications not currently prescribed to me. Implement a self-care routine.       People in my life that I can ask for help: friends or family, trusted teachers/staff/colleagues, trusted members of my community or place of [employer], mental health crisis lines, or 911     Your [facility] has a mental health crisis team you can call 24/7: [facility], [phone]     Other things that are important when I m in crisis: to remember that the feelings I am having right now are temporary, and it won't feel like this forever, and that it is okay and important to ask for help     Crisis Lines  Crisis Text Line  Text 523347  You will be connected with a trained live crisis counselor to provide support.     Por espanol, texto  SIRENA a 925153 o texto a 442-AYUDAME en WhatsApp     National Hope Line  1.800.SUICIDE [8625332]        Community Resources  Fast Tracker  Linking people to mental health and substance use disorder resources   "itsDapperckermn.[employer]      [place] Mental Health Warm Line  Peer to peer support  Monday thru Saturday, 12 pm to 10 pm  430.853.0901 or 5.155.601.6501  Text \"Support\" to 93201     National [place] on Mental Illness (MAGY)  340.061.4585 or 1.888.MAGY.HELPS        Mental Health Apps  My3  https://[employer] (ROOF).org/     VirtualHopeBox  https://[employer]/apps/virtual-hope-box/        Additional Information  Today you were seen by a licensed mental health professional through Triage and Transition services, [facility] ([facility])  for a crisis assessment in the Emergency Department at [facility].  It is recommended that you follow up with your established providers (psychiatrist, mental health therapist, and/or primary care doctor - as relevant) as soon as possible. Coordinators from [facility] will be calling you in the next 24-48 hours to ensure that you have the resources you need.  You can also contact [facility] coordinators directly at [phone]. You may have been scheduled for or offered an appointment with a mental health provider. [facility] maintains an extensive network of licensed behavioral health providers to connect patients with the services they need.  We do not charge providers a fee to participate in our referral network.  We match patients with providers based on a patient's specific needs, insurance coverage, and location.  Our first effort will be to refer you to a provider within your care system, and will utilize providers outside your care system as needed.          "

## 2023-05-09 NOTE — TELEPHONE ENCOUNTER
Pt calling to report she is suicidal. Hitting her head on the wall and biting herself.    Pt lives in a group home, which she claims is unstaffed.    FNA called group home RN, and notified that pt called us. [facility] RN provided information that pt has been calling 911 and going to the hospital for suicidal concerns.    FNA called [facility] [phone], staff Arlene is notified that FNA will call 911 and have EMS/ at their place.    FNA called 911 and dispatcher aware of Sadaf being a frequent caller.  will be dispatched to group home.    Kizzy Campbell RN/[place] Nurse Advisor      Reason for Disposition    Patient attempted suicide    Protocols used: SUICIDE LOFIXKQS-H-FA
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
## 2023-05-09 NOTE — DISCHARGE INSTRUCTIONS
Consider going into an IRTS for intensive, long-term support and treatment. It will be different this time as there will be changes to staff and patients.    Follow-up established care and services.

## 2023-05-09 NOTE — ED NOTES
Bed: ED14  Expected date:   Expected time:   Means of arrival:   Comments:  Fadi 738    21 yo F   SI

## 2023-05-09 NOTE — ED TRIAGE NOTES
SIB/SI Plan was bang her head on the wall until she .     Triage Assessment     Row Name [id number]       Triage Assessment (Adult)    Airway WDL WDL       Respiratory WDL    Respiratory WDL WDL       Skin Circulation/Temperature WDL    Skin Circulation/Temperature WDL WDL       Cardiac WDL    Cardiac WDL WDL       Peripheral/Neurovascular WDL    Peripheral Neurovascular WDL WDL       Cognitive/Neuro/Behavioral WDL    Cognitive/Neuro/Behavioral WDL WDL

## 2023-05-10 ENCOUNTER — NURSE TRIAGE (OUTPATIENT)
Dept: NURSING | Facility: CLINIC | Age: 23
End: 2023-05-10
Payer: MEDICARE
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
## 2023-06-09 ENCOUNTER — HOSPITAL ENCOUNTER (EMERGENCY)
Facility: CLINIC | Age: 23
Discharge: LEFT BEFORE DISCHARGE | End: 2023-06-09
Admitting: EMERGENCY MEDICINE
Payer: MEDICARE

## 2023-06-09 VITALS — SYSTOLIC BLOOD PRESSURE: 117 MMHG | DIASTOLIC BLOOD PRESSURE: 52 MMHG | HEART RATE: 83 BPM | OXYGEN SATURATION: 98 %

## 2023-06-09 PROCEDURE — 99281 EMR DPT VST MAYX REQ PHY/QHP: CPT

## 2023-06-09 NOTE — ED TRIAGE NOTES
Per EMS report, pt noticed new wound on lower abd earlier today.  Reports pain, reddening around site.  No treatment en route.

## 2023-06-09 NOTE — ED NOTES
Bed: [facility]  Expected date: 6/9/23  Expected time: 4:58 PM  Means of arrival: Ambulance  Comments:  [id number]  42F  Boil on stomach  Green pt

## 2023-06-10 ENCOUNTER — HOSPITAL ENCOUNTER (EMERGENCY)
Facility: CLINIC | Age: 23
Discharge: HOME | End: 2023-06-10
Attending: EMERGENCY MEDICINE | Admitting: EMERGENCY MEDICINE
Payer: MEDICARE

## 2023-06-10 VITALS
SYSTOLIC BLOOD PRESSURE: 124 MMHG | OXYGEN SATURATION: 98 % | TEMPERATURE: 98.3 F | RESPIRATION RATE: 16 BRPM | DIASTOLIC BLOOD PRESSURE: 85 MMHG | HEART RATE: 64 BPM

## 2023-06-10 DIAGNOSIS — L02.91 ABSCESS: ICD-10-CM

## 2023-06-10 PROCEDURE — 99284 EMERGENCY DEPT VISIT MOD MDM: CPT | Performed by: EMERGENCY MEDICINE

## 2023-06-10 PROCEDURE — 99283 EMERGENCY DEPT VISIT LOW MDM: CPT | Performed by: EMERGENCY MEDICINE

## 2023-06-10 PROCEDURE — 250N000013 HC RX MED GY IP 250 OP 250 PS 637: Performed by: EMERGENCY MEDICINE

## 2023-06-10 PROCEDURE — 250N000009 HC RX 250: Performed by: EMERGENCY MEDICINE

## 2023-06-10 RX ORDER — MUPIROCIN 20 MG/G
OINTMENT TOPICAL 3 TIMES DAILY
Qty: 15 G | Refills: 0 | Status: SHIPPED | OUTPATIENT
Start: 2023-06-10 | End: 2023-06-10

## 2023-06-10 RX ORDER — SULFAMETHOXAZOLE/TRIMETHOPRIM 800-160 MG
1 TABLET ORAL 2 TIMES DAILY
Qty: 10 TABLET | Refills: 0 | Status: SHIPPED | OUTPATIENT
Start: 2023-06-10 | End: 2023-06-10

## 2023-06-10 RX ORDER — LIDOCAINE 40 MG/G
CREAM TOPICAL
Status: COMPLETED | OUTPATIENT
Start: 2023-06-10 | End: 2023-06-10

## 2023-06-10 RX ORDER — MUPIROCIN 20 MG/G
OINTMENT TOPICAL 3 TIMES DAILY
Qty: 15 G | Refills: 0 | Status: SHIPPED | OUTPATIENT
Start: 2023-06-10 | End: 2024-05-19

## 2023-06-10 RX ORDER — SULFAMETHOXAZOLE/TRIMETHOPRIM 800-160 MG
1 TABLET ORAL ONCE
Status: COMPLETED | OUTPATIENT
Start: 2023-06-10 | End: 2023-06-10

## 2023-06-10 RX ORDER — SULFAMETHOXAZOLE/TRIMETHOPRIM 800-160 MG
1 TABLET ORAL 2 TIMES DAILY
Qty: 10 TABLET | Refills: 0 | Status: SHIPPED | OUTPATIENT
Start: 2023-06-10 | End: 2023-07-14

## 2023-06-10 RX ADMIN — SULFAMETHOXAZOLE AND TRIMETHOPRIM 1 TABLET: 800; 160 TABLET ORAL at 20:22

## 2023-06-10 RX ADMIN — LIDOCAINE: 40 CREAM TOPICAL at 20:23

## 2023-06-10 ASSESSMENT — ACTIVITIES OF DAILY LIVING (ADL): ADLS_ACUITY_SCORE: 37

## 2023-06-11 NOTE — ED NOTES
Patient reporting the topical lidocaine to be burning on the area. Writer wiped the area and placed a clean bandage on the abscess area. Patient tolerated well and reported the pain/burning sensation to be decreased.

## 2023-06-11 NOTE — ED PROVIDER NOTES
[facility] EMERGENCY DEPARTMENT ([facility])    6/10/23      ED Provider Note  [facility]      History     Chief Complaint   Patient presents with     Wound Check     Pt states a scab on their stomach ripped off on their shirt. States it is very painful. Area covered.     The history is provided by the patient and medical records.     Sadaf Ross is a 22 year old female with a past medical history of ADHD, borderline personality disorder, anxiety, bipolar disorder, psychosis, MDD, and SI, who presents to the emergency department for evaluation of a wound.  Patient reports a scab on her lower abdomen, stating it is very painful.  She reports that she noticed the wound on Friday (06/9).  No other complaints at this time.      Past Medical History  Past Medical History:   Diagnosis Date     ADHD (attention deficit hyperactivity disorder)      Bipolar 1 disorder (H)      Borderline personality disorder (H)      Depression      Depressive disorder      Intellectual disability      Obesity      Syncope      Past Surgical History:   Procedure Laterality Date     APPENDECTOMY       APPENDECTOMY       mupirocin (BACTROBAN) 2 % external ointment  sulfamethoxazole-trimethoprim (BACTRIM DS) 800-160 MG tablet  acetaminophen (TYLENOL) 325 MG tablet  albuterol (PROAIR HFA/PROVENTIL HFA/VENTOLIN HFA) 108 (90 Base) MCG/ACT inhaler  alum & mag hydroxide-simethicone (MAALOX  ES) 400-400-40 MG/5ML SUSP suspension  ARIPiprazole lauroxil ER (ARISTADA) 882 MG/3.2ML intra-muscular  benzonatate (TESSALON) 200 MG capsule  benztropine (COGENTIN) 1 MG tablet  bisacodyl (DULCOLAX) 5 MG EC tablet  calcium carbonate (TUMS) 500 MG chewable tablet  clobetasol (TEMOVATE) 0.05 % CREA cream  cyclobenzaprine (FLEXERIL) 10 MG tablet  diclofenac (VOLTAREN) 1 % topical gel  docusate sodium (COLACE) 50 MG capsule  fluticasone (FLONASE) 50 MCG/ACT nasal spray  guaiFENesin (MUCINEX) 600 MG 12 hr  tablet  hydrocortisone (CORTAID) 0.5 % external cream  hydrOXYzine (ATARAX) 50 MG tablet  hyoscyamine (LEVSIN/SL) 0.125 MG sublingual tablet  ibuprofen (ADVIL/MOTRIN) 400 MG tablet  loperamide (IMODIUM) 2 MG capsule  loratadine (CLARITIN) 10 MG tablet  LORazepam (ATIVAN) 0.5 MG tablet  multivitamin, therapeutic (THERA-VIT) TABS tablet  OLANZapine zydis (ZYPREXA) 5 MG ODT  omeprazole (PRILOSEC) 40 MG DR capsule  ondansetron (ZOFRAN) 4 MG tablet  ondansetron (ZOFRAN) 4 MG tablet  polyethylene glycol (MIRALAX) 17 GM/Dose powder  prochlorperazine (COMPAZINE) 10 MG tablet  promethazine (PHENERGAN) 12.5 MG tablet  sennosides (SENOKOT) 8.6 MG tablet  traZODone (DESYREL) 50 MG tablet  venlafaxine (EFFEXOR-ER) 225 MG 24 hr tablet  Vitamin D, Cholecalciferol, 25 MCG (1000 UT) TABS      Allergies   Allergen Reactions     Penicillins Rash and Unknown     Family History  Family History   Problem Relation Age of Onset     Diabetes Type 1 Father      Cancer Paternal Grandfather      Social History   Social History     Tobacco Use     Smoking status: Every Day     Packs/day: 1.00     Years: 5.00     Pack years: 5.00     Types: Vaping Device, Cigarettes     Smokeless tobacco: Never   Substance Use Topics     Alcohol use: No     Drug use: No      Past medical history, past surgical history, medications, allergies, family history, and social history were reviewed with the patient. No additional pertinent items.      A complete review of systems was performed with pertinent positives and negatives noted in the HPI, and all other systems negative.    Physical Exam   BP: 124/85  Pulse: 64  Temp: 98.3  F (36.8  C)  Resp: 16  SpO2: 98 %  Physical Exam  Vitals and nursing note reviewed.   Constitutional:       General: She is not in acute distress.     Appearance: Normal appearance.   HENT:      Head: Normocephalic.      Nose: Nose normal.   Eyes:      Pupils: Pupils are equal, round, and reactive to light.   Cardiovascular:      Rate and  Rhythm: Normal rate and regular rhythm.   Pulmonary:      Effort: Pulmonary effort is normal.   Abdominal:      General: There is no distension.      Palpations: Abdomen is soft.      Tenderness: There is no abdominal tenderness. There is no guarding.       Musculoskeletal:         General: No deformity. Normal range of motion.      Cervical back: Normal range of motion.   Skin:     General: Skin is warm.   Neurological:      Mental Status: She is alert and oriented to person, place, and time.   Psychiatric:         Mood and Affect: Mood normal.           ED Course, Procedures, & Data            No results found for any visits on 06/10/23.  Medications   sulfamethoxazole-trimethoprim (BACTRIM DS) 800-160 MG per tablet 1 tablet (1 tablet Oral $Given 6/10/23 2022)   lidocaine (LMX4) cream ( Topical $Given 6/10/23 2023)     Labs Ordered and Resulted from Time of ED Arrival to Time of ED Departure - No data to display  No orders to display          Critical care was not performed.     Medical Decision Making  The patient's presentation was of low complexity (an acute and uncomplicated illness or injury).    The patient's evaluation involved:  history and exam without other MDM data elements    The patient's management necessitated moderate risk (prescription drug management including medications given in the ED).      Assessment & Plan    Patient presents the ED for evaluation of abdominal wound.  On exam, she has normal vital signs.  Overall appears well and nondistressed.  She has a 1 cm wound on her abdomen which is consistent with likely draining abscess.  There is a scant amount of purulent discharge. There is some surrounding erythema which could represent early cellulitis.  As it is actively draining, no indication for I&D.  Have started on mupirocin and Bactrim.  First dose of medications given in the ED.  Rx sent to pharmacy.  Educated on reasons to return to the ED.    I have reviewed the nursing notes. I have  reviewed the findings, diagnosis, plan and need for follow up with the patient.    Discharge Medication List as of 6/10/2023  9:05 PM      START taking these medications    Details   mupirocin (BACTROBAN) 2 % external ointment Apply topically 3 times dailyDisp-15 g, R-0Local Print      sulfamethoxazole-trimethoprim (BACTRIM DS) 800-160 MG tablet Take 1 tablet by mouth 2 times daily for 5 days, Disp-10 tablet, R-0, Local Print             Final diagnoses:   Abscess     I, Idalia Capps, am serving as a trained medical scribe to document services personally performed by Ghassan Browning DO, based on the provider's statements to me.     I, Ghassan Browning DO, was physically present and have reviewed and verified the accuracy of this note documented by Idalia Capps.    Ghassan Browning DO.  [facility] EMERGENCY DEPARTMENT  6/10/2023     Ghassan Browning DO  06/11/23 0043

## 2023-06-12 ENCOUNTER — HOSPITAL ENCOUNTER (EMERGENCY)
Facility: CLINIC | Age: 23
Discharge: NURSING, REHAB OR LONG-TERM CARE | End: 2023-06-12
Attending: FAMILY MEDICINE | Admitting: FAMILY MEDICINE
Payer: MEDICARE

## 2023-06-12 ENCOUNTER — NURSE TRIAGE (OUTPATIENT)
Dept: NURSING | Facility: CLINIC | Age: 23
End: 2023-06-12
Payer: MEDICARE

## 2023-06-12 VITALS
BODY MASS INDEX: 41.4 KG/M2 | HEART RATE: 102 BPM | RESPIRATION RATE: 16 BRPM | DIASTOLIC BLOOD PRESSURE: 78 MMHG | WEIGHT: 242.5 LBS | SYSTOLIC BLOOD PRESSURE: 139 MMHG | OXYGEN SATURATION: 97 % | HEIGHT: 64 IN | TEMPERATURE: 98.5 F

## 2023-06-12 DIAGNOSIS — F31.9 BIPOLAR DISORDER, UNSPECIFIED (H): ICD-10-CM

## 2023-06-12 DIAGNOSIS — F60.3 EXPLOSIVE PERSONALITY DISORDER (H): ICD-10-CM

## 2023-06-12 DIAGNOSIS — L02.221 FURUNCLE OF ABDOMINAL WALL: ICD-10-CM

## 2023-06-12 DIAGNOSIS — L02.92 BOIL: ICD-10-CM

## 2023-06-12 PROCEDURE — 99284 EMERGENCY DEPT VISIT MOD MDM: CPT | Performed by: FAMILY MEDICINE

## 2023-06-12 PROCEDURE — 99283 EMERGENCY DEPT VISIT LOW MDM: CPT

## 2023-06-12 PROCEDURE — 250N000013 HC RX MED GY IP 250 OP 250 PS 637: Performed by: FAMILY MEDICINE

## 2023-06-12 PROCEDURE — 250N000009 HC RX 250: Performed by: FAMILY MEDICINE

## 2023-06-12 RX ORDER — BACITRACIN ZINC 500 [USP'U]/G
OINTMENT TOPICAL ONCE
Status: COMPLETED | OUTPATIENT
Start: 2023-06-12 | End: 2023-06-12

## 2023-06-12 RX ORDER — BENZTROPINE MESYLATE 1 MG/1
1 TABLET ORAL EVERY EVENING
Status: CANCELLED | OUTPATIENT
Start: 2023-06-12

## 2023-06-12 RX ORDER — OLANZAPINE 5 MG/1
5 TABLET, ORALLY DISINTEGRATING ORAL ONCE
Status: COMPLETED | OUTPATIENT
Start: 2023-06-12 | End: 2023-06-12

## 2023-06-12 RX ORDER — HYDROXYZINE HYDROCHLORIDE 50 MG/1
50 TABLET, FILM COATED ORAL 2 TIMES DAILY PRN
Status: CANCELLED | OUTPATIENT
Start: 2023-06-12

## 2023-06-12 RX ADMIN — BACITRACIN ZINC: 500 OINTMENT TOPICAL at 18:38

## 2023-06-12 RX ADMIN — OLANZAPINE 5 MG: 5 TABLET, ORALLY DISINTEGRATING ORAL at 18:37

## 2023-06-12 ASSESSMENT — ACTIVITIES OF DAILY LIVING (ADL)
ADLS_ACUITY_SCORE: 37
ADLS_ACUITY_SCORE: 37

## 2023-06-12 NOTE — ED TRIAGE NOTES
Triage Assessment     Row Name 06/12/23 1818       Triage Assessment (Adult)    Airway WDL WDL       Respiratory WDL    Respiratory WDL WDL       Skin Circulation/Temperature WDL    Skin Circulation/Temperature WDL X  small dime sized wound to lower ABD       Cardiac WDL    Cardiac WDL WDL       Peripheral/Neurovascular WDL    Peripheral Neurovascular WDL WDL       Cognitive/Neuro/Behavioral WDL    Cognitive/Neuro/Behavioral WDL WDL       Coby Coma Scale    Best Eye Response 4-->(E4) spontaneous    Best Motor Response 6-->(M6) obeys commands    Best Verbal Response 5-->(V5) oriented    Coby Coma Scale Score 15

## 2023-06-12 NOTE — ED PROVIDER NOTES
[facility] EMERGENCY DEPARTMENT ([facility])    6/12/23         History     Chief Complaint   Patient presents with     Wound Check     Pt has a dime sized wound to lower ABD - pink with no drainage noted but appears slightly open.      Suicidal     Pt states that the pain from her wound is making her suicidal.      HPI  Sadaf Ross is a 22 year old female who past medical history of ADHD, borderline personality disorder, anxiety, bipolar disorder, psychosis, MDD, and SI, who presents to the emergency department for evaluation of a wound check and suicidal ideation.     Per Epic review:   Patient was presented to the ED on 6/10/2023 due to an abdominal wound. She had a 1 cm wound on her abdomen which is consistent with likely draining abscess.  There is a scant amount of purulent discharge and some surrounding erythema which could represent early cellulitis.  As it is actively draining, no indication for I&D.  Had started on mupirocin and Bactrim. First dose of medications given in the ED.  Rx sent to pharmacy.     Past Medical History  Past Medical History:   Diagnosis Date     ADHD (attention deficit hyperactivity disorder)      Bipolar 1 disorder (H)      Borderline personality disorder (H)      Depression      Depressive disorder      Intellectual disability      Obesity      Syncope      Past Surgical History:   Procedure Laterality Date     APPENDECTOMY       APPENDECTOMY       acetaminophen (TYLENOL) 325 MG tablet  albuterol (PROAIR HFA/PROVENTIL HFA/VENTOLIN HFA) 108 (90 Base) MCG/ACT inhaler  alum & mag hydroxide-simethicone (MAALOX  ES) 400-400-40 MG/5ML SUSP suspension  ARIPiprazole lauroxil ER (ARISTADA) 882 MG/3.2ML intra-muscular  benzonatate (TESSALON) 200 MG capsule  benztropine (COGENTIN) 1 MG tablet  bisacodyl (DULCOLAX) 5 MG EC tablet  calcium carbonate (TUMS) 500 MG chewable tablet  clobetasol (TEMOVATE) 0.05 % CREA cream  cyclobenzaprine (FLEXERIL) 10 MG tablet  diclofenac  "(VOLTAREN) 1 % topical gel  docusate sodium (COLACE) 50 MG capsule  fluticasone (FLONASE) 50 MCG/ACT nasal spray  guaiFENesin (MUCINEX) 600 MG 12 hr tablet  hydrocortisone (CORTAID) 0.5 % external cream  hydrOXYzine (ATARAX) 50 MG tablet  hyoscyamine (LEVSIN/SL) 0.125 MG sublingual tablet  ibuprofen (ADVIL/MOTRIN) 400 MG tablet  loperamide (IMODIUM) 2 MG capsule  loratadine (CLARITIN) 10 MG tablet  LORazepam (ATIVAN) 0.5 MG tablet  multivitamin, therapeutic (THERA-VIT) TABS tablet  mupirocin (BACTROBAN) 2 % external ointment  OLANZapine zydis (ZYPREXA) 5 MG ODT  omeprazole (PRILOSEC) 40 MG DR capsule  ondansetron (ZOFRAN) 4 MG tablet  ondansetron (ZOFRAN) 4 MG tablet  polyethylene glycol (MIRALAX) 17 GM/Dose powder  prochlorperazine (COMPAZINE) 10 MG tablet  promethazine (PHENERGAN) 12.5 MG tablet  sennosides (SENOKOT) 8.6 MG tablet  sulfamethoxazole-trimethoprim (BACTRIM DS) 800-160 MG tablet  traZODone (DESYREL) 50 MG tablet  venlafaxine (EFFEXOR-ER) 225 MG 24 hr tablet  Vitamin D, Cholecalciferol, 25 MCG (1000 UT) TABS      Allergies   Allergen Reactions     Penicillins Rash and Unknown     Family History  Family History   Problem Relation Age of Onset     Diabetes Type 1 Father      Cancer Paternal Grandfather      Social History   Social History     Tobacco Use     Smoking status: Every Day     Packs/day: 1.00     Years: 5.00     Pack years: 5.00     Types: Vaping Device, Cigarettes     Smokeless tobacco: Never   Substance Use Topics     Alcohol use: No     Drug use: No      Past medical history, past surgical history, medications, allergies, family history, and social history were reviewed with the patient. No additional pertinent items.      A complete review of systems was performed with pertinent positives and negatives noted in the HPI, and all other systems negative.    Physical Exam   BP: 139/78  Pulse: 102  Temp: 98.5  F (36.9  C)  Resp: 16  Height: 162.6 cm (5' 4\")  Weight: 110 kg (242 lb 8 oz)  SpO2: " 97 %  Physical Exam  Constitutional:       General: She is not in acute distress.     Appearance: Normal appearance. She is not diaphoretic.   HENT:      Head: Atraumatic.      Mouth/Throat:      Mouth: Mucous membranes are moist.   Eyes:      General: No scleral icterus.     Conjunctiva/sclera: Conjunctivae normal.   Cardiovascular:      Rate and Rhythm: Normal rate.      Heart sounds: Normal heart sounds.   Pulmonary:      Effort: No respiratory distress.      Breath sounds: Normal breath sounds.   Abdominal:      General: Abdomen is flat.   Musculoskeletal:      Cervical back: Neck supple.   Skin:     General: Skin is warm.      Findings: No rash.      Comments: Small boil on anterior aspect of abdomen does not appear to be infected   Neurological:      General: No focal deficit present.      Mental Status: She is alert.      Sensory: No sensory deficit.      Motor: No weakness.      Coordination: Coordination normal.   Psychiatric:         Mood and Affect: Mood is anxious.         Thought Content: Thought content does not include homicidal or suicidal ideation.         ED Course, Procedures, & Data      Procedures            Medications   bacitracin ointment ( Topical $Given 6/12/23 1838)   OLANZapine zydis (zyPREXA) ODT tab 5 mg (5 mg Oral $Given 6/12/23 1837)     Labs Ordered and Resulted from Time of ED Arrival to Time of ED Departure - No data to display  No orders to display          Critical care was not performed.     Medical Decision Making  The patient's presentation was of moderate complexity (an acute illness with systemic symptoms).    The patient's evaluation involved:  history and exam without other MDM data elements    The patient's management necessitated only low risk treatment.      Assessment & Plan        I have reviewed the nursing notes. I have reviewed the findings, diagnosis, plan and need for follow up with the patient.        Final diagnoses:   Francisco CANO  [facility] EMERGENCY DEPARTMENT  6/12/2023     Robert Olea MD  06/14/23 [id number]

## 2023-06-12 NOTE — TELEPHONE ENCOUNTER
"Nurse Triage SBAR    Situation: Abdominal pain    Background: Patient calling. Pt states she was seen yesterday at [facility]. Pt states she was put on ABX, Bactrim, and Mupirocin for it    Assessment: Patient has been having abdominal pain where her \"boil\" is at. Pt states it looks purple. 8/10 pain. Pain is across her whole abdomen. Pain is constantly there. Redness noted to the area, its the size of a quarter. Pain has been getting worse even with the ABX. Pt stated she is not using the Mupirocin because \"it will hurt her\".     Protocol Recommended Disposition: Emergency Department    Recommendation: According to the protocol, Patient should go to the ED now. Advised Patient that the patient needs to go to the ED now. Care advice given. Patient verbalizes understanding and agrees with plan of care. Reviewed concerning symptoms and when to call back.     Megan Lopez RN Nursing Advisor 6/12/2023 4:59 PM     Reason for Disposition    [1] SEVERE pain (e.g., excruciating) AND [2] present > 1 hour    Additional Information    Negative: Shock suspected (e.g., cold/pale/clammy skin, too weak to stand, low BP, rapid pulse)    Negative: Difficult to awaken or acting confused (e.g., disoriented, slurred speech)    Negative: Passed out (i.e., lost consciousness, collapsed and was not responding)    Negative: Sounds like a life-threatening emergency to the triager    Negative: Chest pain    Negative: Pain is mainly in upper abdomen  (if needed ask: \"is it mainly above the belly button?\")    Negative: Followed an abdomen (stomach) injury    Negative: [1] Abdominal pain AND [2] pregnant < 20 weeks    Negative: [1] Abdominal pain AND [2] pregnant 20 or more weeks    Negative: [1] Abdominal pain AND [2] postpartum (from 0 to 6 weeks after delivery)    Protocols used: ABDOMINAL PAIN - FEMALE-A-AH      "

## 2023-06-13 ENCOUNTER — APPOINTMENT (OUTPATIENT)
Dept: GENERAL RADIOLOGY | Facility: CLINIC | Age: 23
End: 2023-06-13
Attending: EMERGENCY MEDICINE
Payer: MEDICARE

## 2023-06-13 ENCOUNTER — HOSPITAL ENCOUNTER (EMERGENCY)
Facility: CLINIC | Age: 23
Discharge: HOME | End: 2023-06-13
Attending: EMERGENCY MEDICINE | Admitting: EMERGENCY MEDICINE
Payer: MEDICARE

## 2023-06-13 ENCOUNTER — NURSE TRIAGE (OUTPATIENT)
Dept: NURSING | Facility: CLINIC | Age: 23
End: 2023-06-13
Payer: MEDICARE

## 2023-06-13 VITALS
SYSTOLIC BLOOD PRESSURE: 122 MMHG | TEMPERATURE: 98.4 F | HEART RATE: 96 BPM | OXYGEN SATURATION: 100 % | RESPIRATION RATE: 12 BRPM | DIASTOLIC BLOOD PRESSURE: 69 MMHG

## 2023-06-13 DIAGNOSIS — M25.511 ACUTE PAIN OF RIGHT SHOULDER: ICD-10-CM

## 2023-06-13 PROCEDURE — 99283 EMERGENCY DEPT VISIT LOW MDM: CPT | Performed by: EMERGENCY MEDICINE

## 2023-06-13 PROCEDURE — 73030 X-RAY EXAM OF SHOULDER: CPT | Mod: RT

## 2023-06-13 PROCEDURE — 99285 EMERGENCY DEPT VISIT HI MDM: CPT | Performed by: EMERGENCY MEDICINE

## 2023-06-13 ASSESSMENT — ACTIVITIES OF DAILY LIVING (ADL)
ADLS_ACUITY_SCORE: 37
ADLS_ACUITY_SCORE: 37

## 2023-06-13 NOTE — ED NOTES
Discharge reported given to Devyn at [phone] at the [facility] for pt to return to [facility] per Devyn. Transportation by ambulance arranged for the pt to return to her [facility]. Pt aware and notified.

## 2023-06-13 NOTE — TELEPHONE ENCOUNTER
"Sadaf calling regarding constant right shoulder pain and \"not feeling right,\" she rate an 8. States she is suicidal. State she wants to hurt herself and has a plan to bite herself. States she is alone in the garage of her group home. Care advice is to call 911. Refused to call 911 but will call for transportion to the ED. Asked if I could talk to her group home staff and she refused.  Aruna Stewart RN on 6/13/2023 at 5:10 PM      Reason for Disposition    Violent behavior, or threatening to physically hurt or kill someone    Additional Information    Negative: Patient attempted suicide    Negative: Patient is threatening suicide now    Protocols used: SUICIDE [id number]      "

## 2023-06-13 NOTE — ED TRIAGE NOTES
Pt presents with right shoulder pain which started 5 minutes prior to arrival. Unknown injury.  Pt states it hurts when it moves it around, denies taking any medications for pain.  Pt states she was here yesterday for boil.

## 2023-06-13 NOTE — ED PROVIDER NOTES
[facility] EMERGENCY DEPARTMENT ([facility])    6/13/23      ED PROVIDER NOTE  Harry TAPIA  History     Chief Complaint   Patient presents with     Shoulder Pain     right     HPI  Sadaf Ross is a 22 year old female well-known to the emergency departments for various complaints who presents to the emergency department with right shoulder pain.  Patient states this started earlier today with no known precipitating factors.  Patient denies any injury.  Patient states that when she moves her shoulder she felt a pop.  No previous similar occurrences in the past.  She states that she has worsening suicidal ideation because of this.  No other symptoms noted.    Past Medical History  Past Medical History:   Diagnosis Date     ADHD (attention deficit hyperactivity disorder)      Bipolar 1 disorder (H)      Borderline personality disorder (H)      Depression      Depressive disorder      Intellectual disability      Obesity      Syncope      Past Surgical History:   Procedure Laterality Date     APPENDECTOMY       APPENDECTOMY       acetaminophen (TYLENOL) 325 MG tablet  albuterol (PROAIR HFA/PROVENTIL HFA/VENTOLIN HFA) 108 (90 Base) MCG/ACT inhaler  alum & mag hydroxide-simethicone (MAALOX  ES) 400-400-40 MG/5ML SUSP suspension  ARIPiprazole lauroxil ER (ARISTADA) 882 MG/3.2ML intra-muscular  benzonatate (TESSALON) 200 MG capsule  benztropine (COGENTIN) 1 MG tablet  bisacodyl (DULCOLAX) 5 MG EC tablet  calcium carbonate (TUMS) 500 MG chewable tablet  clobetasol (TEMOVATE) 0.05 % CREA cream  cyclobenzaprine (FLEXERIL) 10 MG tablet  diclofenac (VOLTAREN) 1 % topical gel  docusate sodium (COLACE) 50 MG capsule  fluticasone (FLONASE) 50 MCG/ACT nasal spray  guaiFENesin (MUCINEX) 600 MG 12 hr tablet  hydrocortisone (CORTAID) 0.5 % external cream  hydrOXYzine (ATARAX) 50 MG tablet  hyoscyamine (LEVSIN/SL) 0.125 MG sublingual tablet  ibuprofen (ADVIL/MOTRIN) 400 MG tablet  loperamide (IMODIUM) 2 MG  capsule  loratadine (CLARITIN) 10 MG tablet  LORazepam (ATIVAN) 0.5 MG tablet  multivitamin, therapeutic (THERA-VIT) TABS tablet  mupirocin (BACTROBAN) 2 % external ointment  OLANZapine zydis (ZYPREXA) 5 MG ODT  omeprazole (PRILOSEC) 40 MG DR capsule  ondansetron (ZOFRAN) 4 MG tablet  ondansetron (ZOFRAN) 4 MG tablet  polyethylene glycol (MIRALAX) 17 GM/Dose powder  prochlorperazine (COMPAZINE) 10 MG tablet  promethazine (PHENERGAN) 12.5 MG tablet  sennosides (SENOKOT) 8.6 MG tablet  sulfamethoxazole-trimethoprim (BACTRIM DS) 800-160 MG tablet  traZODone (DESYREL) 50 MG tablet  venlafaxine (EFFEXOR-ER) 225 MG 24 hr tablet  Vitamin D, Cholecalciferol, 25 MCG (1000 UT) TABS      Allergies   Allergen Reactions     Penicillins Rash and Unknown     Family History  Family History   Problem Relation Age of Onset     Diabetes Type 1 Father      Cancer Paternal Grandfather      Social History   Social History     Tobacco Use     Smoking status: Every Day     Packs/day: 1.00     Years: 5.00     Pack years: 5.00     Types: Vaping Device, Cigarettes     Smokeless tobacco: Never   Substance Use Topics     Alcohol use: No     Drug use: No         A medically appropriate review of systems was performed with pertinent positives and negatives noted in the HPI, and all other systems negative.    Physical Exam   BP: 122/69  Pulse: 96  Temp: 98.4  F (36.9  C)  Resp: 12  SpO2: 100 %  Physical Exam  Constitutional:       General: She is not in acute distress.     Appearance: She is well-developed. She is not diaphoretic.   HENT:      Head: Normocephalic and atraumatic.      Mouth/Throat:      Pharynx: No oropharyngeal exudate.   Eyes:      General: No scleral icterus.        Right eye: No discharge.         Left eye: No discharge.      Pupils: Pupils are equal, round, and reactive to light.   Cardiovascular:      Rate and Rhythm: Normal rate and regular rhythm.      Heart sounds: Normal heart sounds. No murmur heard.     No friction rub.  No gallop.   Pulmonary:      Effort: Pulmonary effort is normal. No respiratory distress.      Breath sounds: Normal breath sounds. No wheezing.   Chest:      Chest wall: No tenderness.   Abdominal:      General: Bowel sounds are normal. There is no distension.      Palpations: Abdomen is soft.      Tenderness: There is no abdominal tenderness.   Musculoskeletal:         General: No tenderness or deformity. Normal range of motion.      Cervical back: Normal range of motion and neck supple.   Skin:     General: Skin is warm and dry.      Coloration: Skin is not pale.      Findings: No erythema or rash.   Neurological:      Mental Status: She is alert and oriented to person, place, and time.      Cranial Nerves: No cranial nerve deficit.           ED Course, Procedures, & Data      Procedures          Mental Health Risk Assessment      PSS-3    Date and Time Over the past 2 weeks have you felt down, depressed, or hopeless? Over the past 2 weeks have you had thoughts of killing yourself? Have you ever attempted to kill yourself? When did this last happen? User   06/13/23 1834 yes yes no --       C-SSRS ([place])    Date and Time Q1 Wished to be Dead (Past Month) Q2 Suicidal Thoughts (Past Month) Q3 Suicidal Thought Method Q4 Suicidal Intent without Specific Plan Q5 Suicide Intent with Specific Plan Q6 Suicide Behavior (Lifetime) Within the Past 3 Months? RETIRED: Level of Risk per Screen Screening Not Complete User   06/13/23 1834 yes yes no no no yes -- -- --                      No results found for any visits on 06/13/23.  Medications - No data to display  Labs Ordered and Resulted from Time of ED Arrival to Time of ED Departure - No data to display  No orders to display          Critical care was not performed.         Assessment & Plan    This is a 22-year-old female presents with right shoulder pain.  This.  No known precipitating factors.  Patient states this has worsened her chronic suicidal ideation.   Exam demonstrates no acute abnormality.  X-ray shows no acute abnormalities.  I discussed all results with patient.  We will discharge patient back to group home.    I have reviewed the nursing notes. I have reviewed the findings, diagnosis, plan and need for follow up with the patient.    New Prescriptions    No medications on file       Final diagnoses:   None       I, Larisa Grant, am serving as a trained medical scribe to document services personally performed by Ramo Sawant DO based on the provider's statements to me on June 13, 2023.  This document has been checked and approved by the attending provider.    I, Ramo Sawant DO, was physically present and have reviewed and verified the accuracy of this note documented by Larisa Grant, medical scribe.      Ramo Sawant DO  [facility] EMERGENCY DEPARTMENT  6/13/2023     Ramo Sawant DO  06/14/23 0134

## 2023-06-14 ENCOUNTER — NURSE TRIAGE (OUTPATIENT)
Dept: NURSING | Facility: CLINIC | Age: 23
End: 2023-06-14
Payer: MEDICARE

## 2023-06-14 ENCOUNTER — HOSPITAL ENCOUNTER (EMERGENCY)
Facility: CLINIC | Age: 23
Discharge: HOME | End: 2023-06-14
Admitting: EMERGENCY MEDICINE
Payer: MEDICARE

## 2023-06-14 ENCOUNTER — NURSE TRIAGE (OUTPATIENT)
Dept: FAMILY MEDICINE | Facility: CLINIC | Age: 23
End: 2023-06-14
Payer: MEDICARE

## 2023-06-14 PROCEDURE — 99281 EMR DPT VST MAYX REQ PHY/QHP: CPT

## 2023-06-14 NOTE — TELEPHONE ENCOUNTER
Sadaf calling states she is having suicidal thoughts, no plan. States she has not tried to harm herself today. States she is in the garage of her group home. Asked her what happened today? She hung up. I called her back and left message to call. Notified the group home and they said they just checked on her and she has already had a ride today. They said they will keep her safe.  Aruna Stewart RN on 6/14/2023 at 3:00 PM      Reason for Disposition    Patient is threatening suicide now    Additional Information    Negative: Patient attempted suicide    Protocols used: SUICIDE [id number]

## 2023-06-14 NOTE — TELEPHONE ENCOUNTER
S - (situation):  Patient who has contact FNA multiple times today.    B - (background):  Ongoing issue with this caller, known to leadership, who has an extensive hx of ED visits and multiple calls to triage in a day. Patient has called 3 times today. I asked the patient if she had gone to the ED per the recommendation from earlier call. She stated, 'No, I didn't. I'm pissed at you guys'. She then terminated the call.    A - (assessment):  Repeat contact with caller with severe mental health issues/concerns.    R - (recommendation): Continue to support patient as much as possible.    Dilshad Shipley, RN, BSN, MSN  [facility]NA Triage 4:20 PM

## 2023-06-14 NOTE — TELEPHONE ENCOUNTER
"Spoke with patient after she was leaving the emergency room for suicidal thoughts, she stated frustration and felt like she was not being \"heard\".  She stated she was at the emergency room and left, then was talking with a [facility] triage nurse and hung up on him because she wanted to talk to a girl nurse.   Patient and I spoke during her ride home from the emergency room by her medical transportation ride,and when she arrived at her group home. Patient stated there were people at the home and she was not alone at this time.  Patient stated she was feeling sad since her father passed away in February of 2021.  She stated she had a regular routine taking care of her father and his pets.  She stated she felt lost without having to take care of her dad.  Patient verbalized that she feels sad most of the time.    Advised patient of several home care remedies including but not limited to, starting a new daily routine slowly adding to the list as she feels comfortable, encouraging her to look for classes online, or at the library on topics of interest for caring of others since she did a good job taking care of her father and his pets, making a simple every day list and being ok if it does not get finished, taking naps as she feels tired and before getting frustrated, talking to a counselor or friend on a regular basis to help vent some of her frustrations before she feels overwhelmed, add some of her favorite hobbies like word search to her daily list of things she can do, etc.      During this call, this RN read patient health history.  Health history shows many attempts of asking for help at the emergency room, combined behavioral care with other facility and the patterns patient has been exhibiting frequently.  Patient does not have a PCP through the [facility] facility.  Patient is seen at the [facility] mental health clinic and the [facility] medical [facility].  Patient has records with [facility] through emergency " "room visits at this time.    At the end of the phone call, this RN asked patient if she felt like she was going to hurt herself or others. Patient stated no, she stated she might feel different tomorrow, but right now she was not having any suicidal thoughts.  This RN asked what patient was going to do after we were done talking.  Patient stated she was going to find something to eat and take a nap.   Advised patient to call back and/or seek urgent emergency care for worsening symptoms.  Patient stated understanding.      Reason for Disposition    Patient is not threatening suicide now BUT has had SUICIDAL BEHAVIORS in past 3 months    Sometimes has thoughts of suicide    Additional Information    Negative: Patient attempted suicide    Negative: Patient is threatening suicide now    Negative: Violent behavior, or threatening to physically hurt or kill someone    Negative: Patient is very confused (disoriented, slurred speech) and no other adult (e.g., friend or family member) available    Negative: Difficult to awaken or acting very confused (disoriented, slurred speech) and of new-onset    Negative: Sounds like a life-threatening emergency to the triager    Negative: Depression is main symptom and is not threatening suicide    Negative: Depression symptoms (sadness, hopelessness, decreased energy) and unable to do any normal activities (e.g., self care, school, work; in comparison to baseline).    Negative: Patient sounds very sick or weak to the triager    Negative: Patient is not threatening suicide now BUT has a suicide PLAN (e.g., overdose, gunshot) and ACCESS (e.g., collecting pills, gun in house)    Answer Assessment - Initial Assessment Questions  1. MAIN CONCERN: \"What happened that made you call today?\"      No one is listening - after painful shoulder    2. RISK OF HARM - SUICIDAL IDEATION:  \"Do you ever have thoughts of hurting or killing yourself?\"  (e.g., yes, no, no but preoccupation with thoughts " "about death)    - WISH TO BE DEAD:  \"Have you wished you were dead or wished you could go to sleep and not wake up?\"    - INTENT:  \"Have you had any thoughts of hurting or killing yourself?\" (e.g., yes, no, N/A) If Yes, ask: \"Are you having these thoughts about killing yourself right NOW?\"    - PLAN: \"Have you thought about how you might do this?\" \"Do you have a specific plan for how you would do this?\" (e.g., gun, knife, overdose, no plan, N/A)    - ACCESS: If yes to PLAN, \"Do you have access to any of these?\" (e.g., pills, gun in house, knife in kitchen)      Not at this time    3. RISK OF HARM - SUICIDE ATTEMPT: \"Have you tried to harm yourself recently?\" If Yes, ask: When was this?\"        No    4. RISK OF HARM - SUICIDAL BEHAVIOR: \"Have you ever done anything, started to do anything, or prepared to do anything to end your life?\" (e.g., collected pills, bought a gun, wrote a suicide note, cut yourself, started but changed your mind)      No    5. EVENTS AND STRESSORS: \"Has there been any new stress or recent changes in your life?\" (e.g., death of loved one, homelessness, negative event, relationship breakup, work)      Yes    6. FUNCTIONAL IMPAIRMENT: \"How have things been going for you overall? Have you had more difficulty than usual doing your normal daily activities?\"  (e.g., better, same, worse; self-care, school, work, interactions)      Feeling more frustrated today then yesterday    7. SUPPORT: \"Who is with you now?\" \"Who do you live with?\" \"Do you have family or friends who you can talk to?\"       Live in group home    8. THERAPIST: \"Do you have a counselor or therapist? Name?\"      They have nurse, but busy    9. ALCOHOL USE OR SUBSTANCE USE (DRUG USE): \"Do you drink alcohol or use any illegal drugs?\"      No    10. OTHER: \"Do you have any other physical symptoms right now?\" (e.g., fever)        Right should pain    11. PREGNANCY or POSTPARTUM: \"Is there any chance you are pregnant?\" \"When was your last " "menstrual period?\" \"Were you recently pregnant?\" \"When did you give birth?\"        No    Protocols used: SUICIDE [id number]    Lien Parker RN      "

## 2023-06-14 NOTE — ED TRIAGE NOTES
"Pt states she is suicidal making statements like \"This is fucking bullshit\", \"You guys aren't taking me seriously\",  \"I'm super mad at you guys and am going to start punching shit\".  \"I'm going hurt people if I fucking need to\".  \"I'm going to hurt you guys\".      Triage Assessment     Row Name 06/14/23 0738       Triage Assessment (Adult)    Airway WDL WDL       Respiratory WDL    Respiratory WDL WDL       Skin Circulation/Temperature WDL    Skin Circulation/Temperature WDL WDL       Cardiac WDL    Cardiac WDL WDL       Peripheral/Neurovascular WDL    Peripheral Neurovascular WDL WDL       Cognitive/Neuro/Behavioral WDL    Cognitive/Neuro/Behavioral WDL WDL              "

## 2023-06-14 NOTE — ED NOTES
Approximately 1700, group home was called to ensure patients safe arrival.  Was informed pt made it home and was safe.  Pt called her own taxi per group home.      ANS notified of event and pt is aware they can return at anytime to the ER for safety if needed.  Group home notified as well.

## 2023-06-14 NOTE — TELEPHONE ENCOUNTER
Patient calling.    Reports that she was in ED yesterday for shoulder pain, and has been seen for SI in the past.    Calling today with SI. She denies having a plan to harm herself or others.     She states that she has been feeling more depressed since her Father passed away in February, and that she is frustrated that no one will take her seriously. RN sympathized with patient, and provided her with reassurance.    Disposition: Go to ED Now. Patient verbalized understanding.    Brina Aquino RN on 6/14/2023 at 2:57 PM    Reason for Disposition    Depression symptoms (sadness, hopelessness, decreased energy) and unable to do any normal activities (e.g., self care, school, work; in comparison to baseline).    Additional Information    Negative: Patient attempted suicide    Negative: Patient is threatening suicide now    Negative: Violent behavior, or threatening to physically hurt or kill someone    Negative: Patient is very confused (disoriented, slurred speech) and no other adult (e.g., friend or family member) available    Negative: Difficult to awaken or acting very confused (disoriented, slurred speech) and of new-onset    Negative: Sounds like a life-threatening emergency to the triager    Protocols used: SUICIDE [id number]

## 2023-06-14 NOTE — ED NOTES
"Pt was asked to take vitals and patient stated \"don't you fucking touch me\", \"I don't want to fucking be here\", \"You don't fucking help me\".    Pt was told we were going to walk back to ER.  Door opened and patient ran from ER lobby and outside off property.      Pt Eloped from ER.   "

## 2023-06-15 ENCOUNTER — HOSPITAL ENCOUNTER (EMERGENCY)
Facility: CLINIC | Age: 23
Discharge: HOME | End: 2023-06-15
Attending: FAMILY MEDICINE | Admitting: FAMILY MEDICINE
Payer: MEDICARE

## 2023-06-15 VITALS
TEMPERATURE: 99.3 F | HEART RATE: 95 BPM | OXYGEN SATURATION: 97 % | RESPIRATION RATE: 16 BRPM | SYSTOLIC BLOOD PRESSURE: 102 MMHG | DIASTOLIC BLOOD PRESSURE: 69 MMHG

## 2023-06-15 DIAGNOSIS — F79 INTELLECTUAL DISABILITY: Chronic | ICD-10-CM

## 2023-06-15 DIAGNOSIS — R45.851 SUICIDAL IDEATION: ICD-10-CM

## 2023-06-15 DIAGNOSIS — F60.3 BORDERLINE PERSONALITY DISORDER (H): Chronic | ICD-10-CM

## 2023-06-15 LAB
AMPHETAMINES UR QL SCN: NORMAL
BARBITURATES UR QL SCN: NORMAL
BENZODIAZ UR QL SCN: NORMAL
BZE UR QL SCN: NORMAL
CANNABINOIDS UR QL SCN: NORMAL
HCG UR QL: NEGATIVE
OPIATES UR QL SCN: NORMAL
SARS-COV-2 RNA RESP QL NAA+PROBE: NEGATIVE

## 2023-06-15 PROCEDURE — 87635 SARS-COV-2 COVID-19 AMP PRB: CPT | Performed by: FAMILY MEDICINE

## 2023-06-15 PROCEDURE — 81025 URINE PREGNANCY TEST: CPT | Performed by: FAMILY MEDICINE

## 2023-06-15 PROCEDURE — 80307 DRUG TEST PRSMV CHEM ANLYZR: CPT | Performed by: FAMILY MEDICINE

## 2023-06-15 PROCEDURE — 99285 EMERGENCY DEPT VISIT HI MDM: CPT | Performed by: FAMILY MEDICINE

## 2023-06-15 PROCEDURE — 250N000013 HC RX MED GY IP 250 OP 250 PS 637: Performed by: FAMILY MEDICINE

## 2023-06-15 RX ORDER — OLANZAPINE 5 MG/1
10 TABLET, ORALLY DISINTEGRATING ORAL ONCE
Status: COMPLETED | OUTPATIENT
Start: 2023-06-15 | End: 2023-06-15

## 2023-06-15 RX ADMIN — OLANZAPINE 10 MG: 5 TABLET, ORALLY DISINTEGRATING ORAL at 19:42

## 2023-06-15 ASSESSMENT — ACTIVITIES OF DAILY LIVING (ADL): ADLS_ACUITY_SCORE: 37

## 2023-06-16 ENCOUNTER — NURSE TRIAGE (OUTPATIENT)
Dept: NURSING | Facility: CLINIC | Age: 23
End: 2023-06-16
Payer: MEDICARE

## 2023-06-16 NOTE — ED PROVIDER NOTES
"    [facility] EMERGENCY DEPARTMENT ([facility])    6/15/23      ED PROVIDER NOTE  [facility]      History     Chief Complaint   Patient presents with     Suicidal     States over thinking things about suicide stating she wants to bite herself.      HPI  Sadaf Ross is a 22 year old female past medical history of ADHD, borderline personality disorder, anxiety, bipolar disorder, psychosis, MDD, SI and well-known to multiple EDs who presents to the emergency department for evaluation of ongoing thoughts of suicide patient states that she feels as though she is \"inside her head\" and has been having increased thoughts of suicide but she has had similar thoughts on multiple occasions and is arriving here in the emergency room feeling as though she could stabilize.  Patient did note that she is having thoughts of biting herself as well.    Past Medical History  Past Medical History:   Diagnosis Date     ADHD (attention deficit hyperactivity disorder)      Bipolar 1 disorder (H)      Borderline personality disorder (H)      Depression      Depressive disorder      Intellectual disability      Obesity      Syncope      Past Surgical History:   Procedure Laterality Date     APPENDECTOMY       APPENDECTOMY       acetaminophen (TYLENOL) 325 MG tablet  albuterol (PROAIR HFA/PROVENTIL HFA/VENTOLIN HFA) 108 (90 Base) MCG/ACT inhaler  alum & mag hydroxide-simethicone (MAALOX  ES) 400-400-40 MG/5ML SUSP suspension  ARIPiprazole lauroxil ER (ARISTADA) 882 MG/3.2ML intra-muscular  benzonatate (TESSALON) 200 MG capsule  benztropine (COGENTIN) 1 MG tablet  bisacodyl (DULCOLAX) 5 MG EC tablet  calcium carbonate (TUMS) 500 MG chewable tablet  clobetasol (TEMOVATE) 0.05 % CREA cream  cyclobenzaprine (FLEXERIL) 10 MG tablet  diclofenac (VOLTAREN) 1 % topical gel  docusate sodium (COLACE) 50 MG capsule  fluticasone (FLONASE) 50 MCG/ACT nasal spray  guaiFENesin (MUCINEX) 600 MG 12 hr " tablet  hydrocortisone (CORTAID) 0.5 % external cream  hydrOXYzine (ATARAX) 50 MG tablet  hyoscyamine (LEVSIN/SL) 0.125 MG sublingual tablet  ibuprofen (ADVIL/MOTRIN) 400 MG tablet  loperamide (IMODIUM) 2 MG capsule  loratadine (CLARITIN) 10 MG tablet  LORazepam (ATIVAN) 0.5 MG tablet  multivitamin, therapeutic (THERA-VIT) TABS tablet  mupirocin (BACTROBAN) 2 % external ointment  OLANZapine zydis (ZYPREXA) 5 MG ODT  omeprazole (PRILOSEC) 40 MG DR capsule  ondansetron (ZOFRAN) 4 MG tablet  ondansetron (ZOFRAN) 4 MG tablet  polyethylene glycol (MIRALAX) 17 GM/Dose powder  prochlorperazine (COMPAZINE) 10 MG tablet  promethazine (PHENERGAN) 12.5 MG tablet  sennosides (SENOKOT) 8.6 MG tablet  sulfamethoxazole-trimethoprim (BACTRIM DS) 800-160 MG tablet  traZODone (DESYREL) 50 MG tablet  venlafaxine (EFFEXOR-ER) 225 MG 24 hr tablet  Vitamin D, Cholecalciferol, 25 MCG (1000 UT) TABS      Allergies   Allergen Reactions     Penicillins Rash and Unknown     Family History  Family History   Problem Relation Age of Onset     Diabetes Type 1 Father      Cancer Paternal Grandfather      Social History   Social History     Tobacco Use     Smoking status: Every Day     Packs/day: 1.00     Years: 5.00     Pack years: 5.00     Types: Vaping Device, Cigarettes     Smokeless tobacco: Never   Substance Use Topics     Alcohol use: No     Drug use: No         A medically appropriate review of systems was performed with pertinent positives and negatives noted in the HPI, and all other systems negative.    Physical Exam   BP: 102/69  Pulse: 95  Temp: 99.3  F (37.4  C)  Resp: 16  SpO2: 97 %  Physical Exam  Constitutional:       General: She is not in acute distress.     Appearance: Normal appearance. She is not diaphoretic.   HENT:      Head: Atraumatic.      Mouth/Throat:      Mouth: Mucous membranes are moist.   Eyes:      General: No scleral icterus.     Conjunctiva/sclera: Conjunctivae normal.   Cardiovascular:      Rate and Rhythm:  Normal rate.      Heart sounds: Normal heart sounds.   Pulmonary:      Effort: No respiratory distress.      Breath sounds: Normal breath sounds.   Abdominal:      General: Abdomen is flat.   Musculoskeletal:      Cervical back: Neck supple.   Skin:     General: Skin is warm.      Findings: No rash.   Neurological:      General: No focal deficit present.      Mental Status: She is alert and oriented to person, place, and time.      Sensory: No sensory deficit.      Motor: No weakness.      Coordination: Coordination normal.   Psychiatric:         Mood and Affect: Mood is anxious.         Speech: Speech normal.         Behavior: Behavior is cooperative.         Thought Content: Thought content includes suicidal ideation. Thought content does not include suicidal plan.           ED Course, Procedures, & Data      Procedures         Results for orders placed or performed during the hospital encounter of 06/15/23   HCG qualitative urine     Status: Normal   Result Value Ref Range    hCG Urine Qualitative Negative Negative   Asymptomatic COVID-19 Virus (Coronavirus) by PCR Nasopharyngeal     Status: Normal    Specimen: Nasopharyngeal; Swab   Result Value Ref Range    SARS CoV2 PCR Negative Negative    Narrative    Testing was performed using the Xpert Xpress SARS-CoV-2 Assay on the Cepheid Gene-Xpert Instrument Systems. Additional information about this Emergency Use Authorization (EUA) assay can be found via the Lab Guide. This test should be ordered for the detection of SARS-CoV-2 in individuals who meet SARS-CoV-2 clinical and/or epidemiological criteria as well as from individuals without symptoms or other reasons to suspect COVID-19. Test performance for asymptomatic patients has only been established in anterior nasal swab specimens. This test is for in vitro diagnostic use under the FDA EUA for laboratories certified under CLIA to perform high complexity testing. This test has not been FDA cleared or approved. A  negative result does not rule out the presence of PCR inhibitors in the specimen or target RNA concentration below the limit of detection for the assay. The possibility of a false negative should be considered if the patient's recent exposure or clinical presentation suggests COVID-19. This test was validated by the [facility]. This laboratory is certified under the Clinical Laboratory Improvement Amendments (CLIA) as qualified to perform high complexity laboratory testing.     Drug abuse screen 1 urine (ED)     Status: Normal   Result Value Ref Range    Amphetamines Urine Screen Negative Screen Negative    Barbituates Urine Screen Negative Screen Negative    Benzodiazepine Urine Screen Negative Screen Negative    Cannabinoids Urine Screen Negative Screen Negative    Cocaine Urine Screen Negative Screen Negative    Opiates Urine Screen Negative Screen Negative   Urine Drugs of Abuse Screen     Status: Normal    Narrative    The following orders were created for panel order Urine Drugs of Abuse Screen.  Procedure                               Abnormality         Status                     ---------                               -----------         ------                     Drug abuse screen 1 urin...[[id number]]  Normal              Final result                 Please view results for these tests on the individual orders.     Medications   OLANZapine zydis (zyPREXA) ODT tab 10 mg (10 mg Oral $Given 6/15/23 1942)     Labs Ordered and Resulted from Time of ED Arrival to Time of ED Departure   HCG QUALITATIVE URINE - Normal       Result Value    hCG Urine Qualitative Negative     COVID-19 VIRUS (CORONAVIRUS) BY PCR - Normal    SARS CoV2 PCR Negative     DRUG ABUSE SCREEN 1 URINE (ED) - Normal    Amphetamines Urine Screen Negative      Barbituates Urine Screen Negative      Benzodiazepine Urine Screen Negative      Cannabinoids Urine Screen Negative      Cocaine Urine Screen Negative       Opiates Urine Screen Negative       No orders to display          Critical care was not performed.     Medical Decision Making  The patient's presentation was of moderate complexity (a chronic illness mild to moderate exacerbation, progression, or side effect of treatment).    The patient's evaluation involved:  ordering and/or review of 3+ test(s) in this encounter (see separate area of note for details)    The patient's management necessitated high risk (a decision regarding hospitalization).  Patient with chronic suicidality at this time was evaluated to determine whether or not she required hospitalization she is returning to baseline after Zyprexa was given and will not require hospitalization.      Assessment & Plan      I have reviewed the nursing notes. I have reviewed the findings, diagnosis, plan and need for follow up with the patient.    With underlying borderline personality disorder intellectual disability presenting with chronic suicidality improving with Zyprexa here she will return to her group home and return to follow-up with outpatient providers.    Final diagnoses:   Intellectual disability   Borderline personality disorder (H)   Suicidal ideation       Robert Olea  [facility] EMERGENCY DEPARTMENT  6/15/2023     Robert Olea MD  06/17/23 9658

## 2023-06-16 NOTE — ED TRIAGE NOTES
Triage Assessment     Row Name 06/15/23 4408       Triage Assessment (Adult)    Airway WDL WDL       Respiratory WDL    Respiratory WDL WDL       Skin Circulation/Temperature WDL    Skin Circulation/Temperature WDL WDL       Cardiac WDL    Cardiac WDL WDL       Peripheral/Neurovascular WDL    Peripheral Neurovascular WDL WDL       Cognitive/Neuro/Behavioral WDL    Cognitive/Neuro/Behavioral WDL WDL

## 2023-06-16 NOTE — ED NOTES
Report given to [facility] ([phone]). Ready for patient. [facility] setting up ride.

## 2023-06-17 NOTE — TELEPHONE ENCOUNTER
"Pt reports she is having trouble having a bowel a movement and \"starting to bleed from straining\". Pt reports \"just a little bit around 6:40 pm today\". Pt reports she had a bowel movement yesterday and today. Pt also reports she is suicidal. Pt gave phone to group home staff so Writer could speak to Sophie caregiver. Writer asked Sophie about pt's reporting being suicidal. Sophie states \"she does this all the time, she can go to the ER if she wants, she goes there 3-4 times a day\". Writer asked pt what she felt she needed tonight and pt stated \"I am going to fucking kill myself ok\" and ended call. Writer attempted to call back. Writer spoke with Arlene and advised Arlene if pt is a danger to herself or others staff should call 911.    Arlene advises Writer \"she (pt) is not\" a danger to herself or others. Arlene verbalizes understanding of advice to call 911 if pt a danger to self or others and agrees to plan.     Reason for Disposition    Caller hangs up    Protocols used: DIFFICULT CALLER-A-AH      "

## 2023-06-18 ENCOUNTER — HOSPITAL ENCOUNTER (EMERGENCY)
Facility: CLINIC | Age: 23
Discharge: HOME | End: 2023-06-18
Attending: FAMILY MEDICINE | Admitting: FAMILY MEDICINE
Payer: MEDICARE

## 2023-06-18 ENCOUNTER — NURSE TRIAGE (OUTPATIENT)
Dept: NURSING | Facility: CLINIC | Age: 23
End: 2023-06-18
Payer: MEDICARE

## 2023-06-18 VITALS
SYSTOLIC BLOOD PRESSURE: 134 MMHG | RESPIRATION RATE: 16 BRPM | TEMPERATURE: 98.7 F | DIASTOLIC BLOOD PRESSURE: 84 MMHG | OXYGEN SATURATION: 100 % | HEART RATE: 86 BPM

## 2023-06-18 DIAGNOSIS — F60.3 BORDERLINE PERSONALITY DISORDER (H): Chronic | ICD-10-CM

## 2023-06-18 DIAGNOSIS — F79 INTELLECTUAL DISABILITY: Chronic | ICD-10-CM

## 2023-06-18 DIAGNOSIS — R45.851 SUICIDAL IDEATION: ICD-10-CM

## 2023-06-18 PROCEDURE — 99284 EMERGENCY DEPT VISIT MOD MDM: CPT | Performed by: FAMILY MEDICINE

## 2023-06-18 PROCEDURE — 99283 EMERGENCY DEPT VISIT LOW MDM: CPT | Performed by: FAMILY MEDICINE

## 2023-06-18 PROCEDURE — 250N000013 HC RX MED GY IP 250 OP 250 PS 637: Performed by: FAMILY MEDICINE

## 2023-06-18 RX ORDER — HYDROCORTISONE 25 MG/G
CREAM TOPICAL 2 TIMES DAILY PRN
Qty: 30 G | Refills: 0 | Status: SHIPPED | OUTPATIENT
Start: 2023-06-18 | End: 2024-05-19

## 2023-06-18 RX ORDER — OLANZAPINE 10 MG/1
10 TABLET, ORALLY DISINTEGRATING ORAL ONCE
Status: COMPLETED | OUTPATIENT
Start: 2023-06-18 | End: 2023-06-18

## 2023-06-18 RX ADMIN — OLANZAPINE 10 MG: 10 TABLET, ORALLY DISINTEGRATING ORAL at 18:51

## 2023-06-18 ASSESSMENT — ACTIVITIES OF DAILY LIVING (ADL)
ADLS_ACUITY_SCORE: 37
ADLS_ACUITY_SCORE: 37

## 2023-06-18 NOTE — ED TRIAGE NOTES
Triage Assessment     Row Name 06/18/23 4779       Triage Assessment (Adult)    Airway WDL WDL       Respiratory WDL    Respiratory WDL WDL       Skin Circulation/Temperature WDL    Skin Circulation/Temperature WDL WDL       Cardiac WDL    Cardiac WDL WDL       Peripheral/Neurovascular WDL    Peripheral Neurovascular WDL WDL       Cognitive/Neuro/Behavioral WDL    Cognitive/Neuro/Behavioral WDL WDL

## 2023-06-18 NOTE — TELEPHONE ENCOUNTER
"Pt calls to report feeling suicidal.  Exhibits intellectual disability.  States \"I live in a group home.\"  Has called 911 repeatedly.  Multiple ED visits for for bipolar disorder with suicidal ideations.  Last ED visit of 6/15/23 indicates pt was \"threatening to bite herself.\"  No After-Visit Summary noted whatsoever for this most recent ED eval of 6/15/23 (72 hours ago).    Today states \"It's Father's Day.\"  \"My dad  three years ago.\"  \"I want to kill myself.\"  Asked pt if her father would want her to harm herself ...  Pt states \"No but it would take the pain away.\"    Group home includes \"two house-mates and my staff.\"  Asked pt if group home staff participate in recreational activities with the residents ...  Pt states \"No, they give us medications.\"  Requested to speak with staff now ...  Pt declines, stating \"They would want us to call 'Arlene' who is the group home nurse ([phone]).\"  Pt agrees to conference-call 'Arlene' now.  Reached Arlene who successfully de-escalates pt from current thought-perseveration and negativity.    Arlene reminds pt \"You were laughing and dancing with your staff the last time I was there.\"  Pt verbalizes agreement.  Pt then changes mind, stating \"Group home staff don't speak English very well.  Group home nurse states \"No Sadaf, your staff speak English just fine; they talk with you all the time.\"    Pt does have upcoming appointments within 24 hours and 48 hours with her [facility] providers (per pt and nurse 'Arlene'.)    Pt continues to exhibit scattered mood signals.  Pt states \"I've been listening to music while I was sleeping.\"  \"I'm happy about my [facility] appointments coming up.\"    Pt states \"Already have the # for the [facility].\"  \"Already called [phone] in the past.\"    Arlene states \"We have a meeting first week of July to attempt to seek a guardian.\"  (Reason -> Pt calls 911 repeatedly, however without a guardian, nurseConrad states \"We can't take her phone away " "from her.\")  States \"She will call continuously.\"    Decision at this time is for pt to sing and dance with group home staff today (Sunday), while looking forward to her upcoming appointments this week.  Pt understands 911 is always her prerogative if all other resources fail.    Laina [facility] Nurse Advisor     Reason for Disposition    Suicide thoughts, threats, attempts, or questions    [1] Patient is not threatening suicide now BUT [2] had SUICIDAL BEHAVIORS in past 3 months    Additional Information    Negative: Patient attempted suicide    Negative: Patient is threatening suicide now    Negative: Violent behavior, or threatening to physically hurt or kill someone    Negative: [1] Patient is very confused (disoriented, slurred speech) AND [2] no other adult (e.g., friend or family member) available    Negative: [1] Difficult to awaken or acting very confused (disoriented, slurred speech) AND [2] new-onset    Negative: Sounds like a life-threatening emergency to the triager    Negative: Patient attempted suicide    Negative: Patient is threatening suicide now    Negative: Violent behavior, or threatening to physically hurt or kill someone    Negative: [1] Patient is very confused (disoriented, slurred speech) AND [2] no other adult (e.g., friend or family member) available    Negative: [1] Difficult to awaken or acting very confused (disoriented, slurred speech) AND [2] new-onset    Negative: Sounds like a life-threatening emergency to the triager    Negative: Depression is main symptom and is not threatening suicide    Negative: [1] Depression symptoms (sadness, hopelessness, decreased energy) AND [2] unable to do any normal activities (e.g., self care, school, work; in comparison to baseline).    Negative: Patient sounds very sick or weak to the triager    Negative: [1] Patient is not threatening suicide now BUT [2] has a suicide PLAN (e.g., overdose, gunshot) and ACCESS (e.g., collecting pills, gun in " house)    Protocols used: DEPRESSION-A-AH, SUICIDE [id number]

## 2023-06-18 NOTE — ED NOTES
Bed: [facility]RED[facility]-D  Expected date: 6/18/23  Expected time: 6:31 PM  Means of arrival:   Comments:  [address]  21yo F  SI  yellow

## 2023-06-18 NOTE — ED PROVIDER NOTES
ED Provider Note  [facility]      History     Chief Complaint   Patient presents with     Suicidal     From group home. Endorses SI. Hx bipolar, depression, PTSD. Triggered by Father's Day, dad passed away several years ago.      HPI  Sadaf Ross is a 22 year old female with a PMH of depression, bipolar, borderline personality disorder who presents for SI.  Patient has extensive history of repeat visits emergency room at times, the group home stated she feels increasing suicidal ideation since it is Father's Day and she has thought about her father dying.  Patient denies any intent to act on her thoughts at this time is open to the idea take Zyprexa since she is calm and return back to group home.      Past Medical History  Past Medical History:   Diagnosis Date     ADHD (attention deficit hyperactivity disorder)      Bipolar 1 disorder (H)      Borderline personality disorder (H)      Depression      Depressive disorder      Intellectual disability      Obesity      Syncope      Past Surgical History:   Procedure Laterality Date     APPENDECTOMY       APPENDECTOMY       hydrocortisone, Perianal, (HYDROCORTISONE) 2.5 % cream  acetaminophen (TYLENOL) 325 MG tablet  albuterol (PROAIR HFA/PROVENTIL HFA/VENTOLIN HFA) 108 (90 Base) MCG/ACT inhaler  alum & mag hydroxide-simethicone (MAALOX  ES) 400-400-40 MG/5ML SUSP suspension  ARIPiprazole lauroxil ER (ARISTADA) 882 MG/3.2ML intra-muscular  benzonatate (TESSALON) 200 MG capsule  benztropine (COGENTIN) 1 MG tablet  bisacodyl (DULCOLAX) 5 MG EC tablet  calcium carbonate (TUMS) 500 MG chewable tablet  clobetasol (TEMOVATE) 0.05 % CREA cream  cyclobenzaprine (FLEXERIL) 10 MG tablet  diclofenac (VOLTAREN) 1 % topical gel  docusate sodium (COLACE) 50 MG capsule  fluticasone (FLONASE) 50 MCG/ACT nasal spray  guaiFENesin (MUCINEX) 600 MG 12 hr tablet  hydrocortisone (CORTAID) 0.5 % external cream  hydrOXYzine (ATARAX) 50 MG tablet  hyoscyamine  (LEVSIN/SL) 0.125 MG sublingual tablet  ibuprofen (ADVIL/MOTRIN) 400 MG tablet  loperamide (IMODIUM) 2 MG capsule  loratadine (CLARITIN) 10 MG tablet  LORazepam (ATIVAN) 0.5 MG tablet  multivitamin, therapeutic (THERA-VIT) TABS tablet  mupirocin (BACTROBAN) 2 % external ointment  OLANZapine zydis (ZYPREXA) 5 MG ODT  omeprazole (PRILOSEC) 40 MG DR capsule  ondansetron (ZOFRAN) 4 MG tablet  ondansetron (ZOFRAN) 4 MG tablet  polyethylene glycol (MIRALAX) 17 GM/Dose powder  prochlorperazine (COMPAZINE) 10 MG tablet  promethazine (PHENERGAN) 12.5 MG tablet  sennosides (SENOKOT) 8.6 MG tablet  sulfamethoxazole-trimethoprim (BACTRIM DS) 800-160 MG tablet  traZODone (DESYREL) 50 MG tablet  venlafaxine (EFFEXOR-ER) 225 MG 24 hr tablet  Vitamin D, Cholecalciferol, 25 MCG (1000 UT) TABS      Allergies   Allergen Reactions     Penicillins Rash and Unknown     Family History  Family History   Problem Relation Age of Onset     Diabetes Type 1 Father      Cancer Paternal Grandfather      Social History   Social History     Tobacco Use     Smoking status: Every Day     Packs/day: 1.00     Years: 5.00     Pack years: 5.00     Types: Vaping Device, Cigarettes     Smokeless tobacco: Never   Substance Use Topics     Alcohol use: No     Drug use: No         A medically appropriate review of systems was performed with pertinent positives and negatives noted in the HPI, and all other systems negative.    Physical Exam   BP: 123/83  Pulse: 105  Temp: 98  F (36.7  C)  Resp: 18  SpO2: 97 %  Physical Exam  Constitutional:       General: She is not in acute distress.     Appearance: Normal appearance. She is not diaphoretic.   HENT:      Head: Atraumatic.      Mouth/Throat:      Mouth: Mucous membranes are moist.   Eyes:      General: No scleral icterus.     Conjunctiva/sclera: Conjunctivae normal.   Cardiovascular:      Rate and Rhythm: Normal rate.      Heart sounds: Normal heart sounds.   Pulmonary:      Effort: No respiratory distress.       Breath sounds: Normal breath sounds.   Abdominal:      General: Abdomen is flat.   Musculoskeletal:      Cervical back: Neck supple.   Skin:     General: Skin is warm.      Findings: No rash.   Neurological:      General: No focal deficit present.      Mental Status: She is alert and oriented to person, place, and time.      Sensory: No sensory deficit.      Motor: No weakness.      Coordination: Coordination normal.   Psychiatric:         Mood and Affect: Mood is anxious.         Behavior: Behavior is cooperative.         Thought Content: Thought content does not include homicidal or suicidal ideation.           ED Course, Procedures, & Data      Procedures         Medications   OLANZapine zydis (zyPREXA) ODT tab 10 mg (10 mg Oral $Given 6/18/23 0323)     Labs Ordered and Resulted from Time of ED Arrival to Time of ED Departure - No data to display  No orders to display          Critical care was not performed.     Medical Decision Making  The patient's presentation was of moderate complexity (a chronic illness mild to moderate exacerbation, progression, or side effect of treatment).    The patient's evaluation involved:  history and exam without other MDM data elements    The patient's management necessitated high risk (a decision regarding hospitalization).      Assessment & Plan        I have reviewed the nursing notes. I have reviewed the findings, diagnosis, plan and need for follow up with the patient.    Discharge Medication List as of 6/18/2023  7:44 PM      START taking these medications    Details   hydrocortisone, Perianal, (HYDROCORTISONE) 2.5 % cream Place rectally 2 times daily as needed for hemorrhoidsDisp-30 g, [id number]             Health issues at this time we did discuss versus ideation  Require hospitalization patient is able to maintain safety and is agreeable to going back to her home she denies and that she is having exacerbation of chronic hemorrhoids and was asking prescription for  Anusol.              Final diagnoses:   Intellectual disability   Suicidal ideation   Borderline personality disorder (H)         [facility] EMERGENCY DEPARTMENT  6/18/2023     Robert Olea MD  06/21/23 2303       Robert Olea MD  06/21/23 2304

## 2023-06-19 ENCOUNTER — NURSE TRIAGE (OUTPATIENT)
Dept: FAMILY MEDICINE | Facility: CLINIC | Age: 23
End: 2023-06-19
Payer: MEDICARE

## 2023-06-19 ENCOUNTER — NURSE TRIAGE (OUTPATIENT)
Dept: NURSING | Facility: CLINIC | Age: 23
End: 2023-06-19
Payer: MEDICARE

## 2023-06-19 ENCOUNTER — HOSPITAL ENCOUNTER (EMERGENCY)
Facility: CLINIC | Age: 23
Discharge: HOME | End: 2023-06-19
Attending: PSYCHIATRY & NEUROLOGY | Admitting: PSYCHIATRY & NEUROLOGY
Payer: MEDICARE

## 2023-06-19 VITALS
SYSTOLIC BLOOD PRESSURE: 133 MMHG | OXYGEN SATURATION: 98 % | DIASTOLIC BLOOD PRESSURE: 84 MMHG | HEART RATE: 104 BPM | RESPIRATION RATE: 16 BRPM | TEMPERATURE: 97.9 F

## 2023-06-19 DIAGNOSIS — F60.3 BORDERLINE PERSONALITY DISORDER (H): ICD-10-CM

## 2023-06-19 DIAGNOSIS — F79 INTELLECTUAL DISABILITY: ICD-10-CM

## 2023-06-19 PROCEDURE — 250N000013 HC RX MED GY IP 250 OP 250 PS 637: Performed by: PSYCHIATRY & NEUROLOGY

## 2023-06-19 PROCEDURE — 99284 EMERGENCY DEPT VISIT MOD MDM: CPT | Performed by: PSYCHIATRY & NEUROLOGY

## 2023-06-19 PROCEDURE — 99283 EMERGENCY DEPT VISIT LOW MDM: CPT | Performed by: PSYCHIATRY & NEUROLOGY

## 2023-06-19 RX ORDER — OLANZAPINE 10 MG/1
10 TABLET, ORALLY DISINTEGRATING ORAL ONCE
Status: COMPLETED | OUTPATIENT
Start: 2023-06-19 | End: 2023-06-19

## 2023-06-19 RX ADMIN — OLANZAPINE 10 MG: 10 TABLET, ORALLY DISINTEGRATING ORAL at 17:32

## 2023-06-19 ASSESSMENT — ACTIVITIES OF DAILY LIVING (ADL)
ADLS_ACUITY_SCORE: 37
ADLS_ACUITY_SCORE: 37

## 2023-06-19 NOTE — CONSULTS
"Pt declined visit. Pt reported that she \"would not speak to anyone\" and said \"go away\"        "

## 2023-06-19 NOTE — ED TRIAGE NOTES
Stated suddenly started having sharp abdominal pain, ULQ.   Triage Assessment     Row Name 06/19/23 4310       Triage Assessment (Adult)    Airway WDL WDL       Respiratory WDL    Respiratory WDL WDL       Skin Circulation/Temperature WDL    Skin Circulation/Temperature WDL WDL       Cardiac WDL    Cardiac WDL WDL       Peripheral/Neurovascular WDL    Peripheral Neurovascular WDL WDL       Cognitive/Neuro/Behavioral WDL    Cognitive/Neuro/Behavioral WDL WDL

## 2023-06-19 NOTE — TELEPHONE ENCOUNTER
Patient denied having an active plan for suicide   Feeling very depressed  and down   Transportation will be their at  At 5 pm  Patient roommate is there to keep her company  Before ride arrives    Reason for Disposition    Depression symptoms (sadness, hopelessness, decreased energy) and unable to do any normal activities (e.g., self care, school, work; in comparison to baseline).    Additional Information    Negative: Patient attempted suicide    Negative: Patient is threatening suicide now    Negative: Violent behavior, or threatening to physically hurt or kill someone    Negative: Patient is very confused (disoriented, slurred speech) and no other adult (e.g., friend or family member) available    Negative: Difficult to awaken or acting very confused (disoriented, slurred speech) and of new-onset    Negative: Sounds like a life-threatening emergency to the triager    Negative: Depression is main symptom and is not threatening suicide    Protocols used: SUICIDE [id number]      EZEQUIEL Cervantes [facility]

## 2023-06-20 ENCOUNTER — HOSPITAL ENCOUNTER (EMERGENCY)
Facility: CLINIC | Age: 23
Discharge: HOME | End: 2023-06-20
Attending: PSYCHIATRY & NEUROLOGY | Admitting: PSYCHIATRY & NEUROLOGY
Payer: MEDICARE

## 2023-06-20 ENCOUNTER — NURSE TRIAGE (OUTPATIENT)
Dept: NURSING | Facility: CLINIC | Age: 23
End: 2023-06-20
Payer: MEDICARE

## 2023-06-20 VITALS
TEMPERATURE: 98.5 F | RESPIRATION RATE: 12 BRPM | OXYGEN SATURATION: 98 % | DIASTOLIC BLOOD PRESSURE: 92 MMHG | SYSTOLIC BLOOD PRESSURE: 136 MMHG | HEART RATE: 93 BPM

## 2023-06-20 DIAGNOSIS — F43.0 ACUTE REACTION TO STRESS: ICD-10-CM

## 2023-06-20 DIAGNOSIS — F79 INTELLECTUAL DISABILITY: ICD-10-CM

## 2023-06-20 PROCEDURE — 99285 EMERGENCY DEPT VISIT HI MDM: CPT | Performed by: PSYCHIATRY & NEUROLOGY

## 2023-06-20 PROCEDURE — 99283 EMERGENCY DEPT VISIT LOW MDM: CPT | Performed by: PSYCHIATRY & NEUROLOGY

## 2023-06-20 PROCEDURE — 250N000013 HC RX MED GY IP 250 OP 250 PS 637: Performed by: PSYCHIATRY & NEUROLOGY

## 2023-06-20 RX ORDER — OLANZAPINE 10 MG/2ML
10 INJECTION, POWDER, FOR SOLUTION INTRAMUSCULAR 2 TIMES DAILY PRN
Status: DISCONTINUED | OUTPATIENT
Start: 2023-06-20 | End: 2023-06-20 | Stop reason: HOSPADM

## 2023-06-20 RX ORDER — OLANZAPINE 10 MG/1
10 TABLET, ORALLY DISINTEGRATING ORAL 2 TIMES DAILY PRN
Status: DISCONTINUED | OUTPATIENT
Start: 2023-06-20 | End: 2023-06-20 | Stop reason: HOSPADM

## 2023-06-20 RX ADMIN — OLANZAPINE 10 MG: 10 TABLET, ORALLY DISINTEGRATING ORAL at 12:28

## 2023-06-20 ASSESSMENT — ACTIVITIES OF DAILY LIVING (ADL): ADLS_ACUITY_SCORE: 37

## 2023-06-20 NOTE — ED PROVIDER NOTES
ED Provider Note  [facility]      History     Chief Complaint   Patient presents with     Suicidal     HPI  Sadaf Ross is a 22 year old female who is here from her clinic via cab. She requested being brought to the ED. This is patient's 4th ED visit past 5 days. She was seen here yesterday reporting that she was feeling suicidal. She has chronic SI and poor frustration tolerance and seeks the ED for all of her immediate needs. Patient refuses to engage with the  or any provider, yelling at them to get the fuck away from me. Per medical record of her visit earlier today at her clinic, she refused to engage with them, and decided to come here instead. Group Home staff was not even aware that she had come to the ED.    Patient today refuses to engage with the  or myself. She otherwise is not agitated nor altered nor psychotic. She did accept a dose of Zyprexa 10 mg.    Past Medical History  Past Medical History:   Diagnosis Date     ADHD (attention deficit hyperactivity disorder)      Bipolar 1 disorder (H)      Borderline personality disorder (H)      Depression      Depressive disorder      Intellectual disability      Obesity      Syncope      Past Surgical History:   Procedure Laterality Date     APPENDECTOMY       APPENDECTOMY       acetaminophen (TYLENOL) 325 MG tablet  albuterol (PROAIR HFA/PROVENTIL HFA/VENTOLIN HFA) 108 (90 Base) MCG/ACT inhaler  alum & mag hydroxide-simethicone (MAALOX  ES) 400-400-40 MG/5ML SUSP suspension  ARIPiprazole lauroxil ER (ARISTADA) 882 MG/3.2ML intra-muscular  benzonatate (TESSALON) 200 MG capsule  benztropine (COGENTIN) 1 MG tablet  bisacodyl (DULCOLAX) 5 MG EC tablet  calcium carbonate (TUMS) 500 MG chewable tablet  clobetasol (TEMOVATE) 0.05 % CREA cream  cyclobenzaprine (FLEXERIL) 10 MG tablet  diclofenac (VOLTAREN) 1 % topical gel  docusate sodium (COLACE) 50 MG capsule  fluticasone (FLONASE) 50 MCG/ACT nasal  spray  guaiFENesin (MUCINEX) 600 MG 12 hr tablet  hydrocortisone (CORTAID) 0.5 % external cream  hydrocortisone, Perianal, (HYDROCORTISONE) 2.5 % cream  hydrOXYzine (ATARAX) 50 MG tablet  hyoscyamine (LEVSIN/SL) 0.125 MG sublingual tablet  ibuprofen (ADVIL/MOTRIN) 400 MG tablet  loperamide (IMODIUM) 2 MG capsule  loratadine (CLARITIN) 10 MG tablet  LORazepam (ATIVAN) 0.5 MG tablet  multivitamin, therapeutic (THERA-VIT) TABS tablet  mupirocin (BACTROBAN) 2 % external ointment  OLANZapine zydis (ZYPREXA) 5 MG ODT  omeprazole (PRILOSEC) 40 MG DR capsule  ondansetron (ZOFRAN) 4 MG tablet  ondansetron (ZOFRAN) 4 MG tablet  polyethylene glycol (MIRALAX) 17 GM/Dose powder  prochlorperazine (COMPAZINE) 10 MG tablet  promethazine (PHENERGAN) 12.5 MG tablet  sennosides (SENOKOT) 8.6 MG tablet  sulfamethoxazole-trimethoprim (BACTRIM DS) 800-160 MG tablet  traZODone (DESYREL) 50 MG tablet  venlafaxine (EFFEXOR-ER) 225 MG 24 hr tablet  Vitamin D, Cholecalciferol, 25 MCG (1000 UT) TABS      Allergies   Allergen Reactions     Penicillins Rash and Unknown     Family History  Family History   Problem Relation Age of Onset     Diabetes Type 1 Father      Cancer Paternal Grandfather      Social History   Social History     Tobacco Use     Smoking status: Every Day     Packs/day: 1.00     Years: 5.00     Pack years: 5.00     Types: Vaping Device, Cigarettes     Smokeless tobacco: Never   Substance Use Topics     Alcohol use: No     Drug use: No         A medically appropriate review of systems was performed with pertinent positives and negatives noted in the HPI, and all other systems negative.    Physical Exam   BP: (!) 136/92  Pulse: 93  Temp: 98.5  F (36.9  C)  Resp: 12  SpO2: 98 %  Physical Exam  Vitals and nursing note reviewed.   HENT:      Head: Normocephalic.   Eyes:      Pupils: Pupils are equal, round, and reactive to light.   Pulmonary:      Effort: Pulmonary effort is normal.   Musculoskeletal:         General: Normal range  of motion.      Cervical back: Normal range of motion.   Neurological:      General: No focal deficit present.      Mental Status: She is alert.   Psychiatric:         Attention and Perception: Attention and perception normal. She does not perceive auditory or visual hallucinations.         Mood and Affect: Mood normal. Affect is inappropriate.         Speech: Speech normal.         Behavior: Behavior is withdrawn. Behavior is not agitated, aggressive, hyperactive or combative.         Thought Content: Thought content normal. Thought content is not delusional. Thought content does not include homicidal ideation.         Cognition and Memory: Cognition and memory normal.         Judgment: Judgment is impulsive and inappropriate.           ED Course, Procedures, & Data      Procedures       ED Course Selections: A consult was attained from the  DEC service. The case was discussed with the  from that service. The consulting service's recommendations were provided at 12:30 PM. 10 minutes spent discussing case, care and disposition.     10 minutes spent reviewing prior records and intervention. Patient has a care plan in place.     Mental Health Risk Assessment      PSS-3    Date and Time Over the past 2 weeks have you felt down, depressed, or hopeless? Over the past 2 weeks have you had thoughts of killing yourself? Have you ever attempted to kill yourself? When did this last happen? User   06/20/23 1214 yes yes yes --       C-SSRS ([place])    Date and Time Q1 Wished to be Dead (Past Month) Q2 Suicidal Thoughts (Past Month) Q3 Suicidal Thought Method Q4 Suicidal Intent without Specific Plan Q5 Suicide Intent with Specific Plan Q6 Suicide Behavior (Lifetime) Within the Past 3 Months? RETIRED: Level of Risk per Screen Screening Not Complete User   06/20/23 1236 yes yes yes no yes yes -- -- --    06/20/23 1214 yes yes yes no yes yes -- -- --               Suicide assessment completed by mental health  (D.E.C., LCSW, etc.)       No results found for any visits on 06/20/23.  Medications   OLANZapine zydis (zyPREXA) ODT tab 10 mg (10 mg Oral $Given 6/20/23 1228)   OLANZapine (zyPREXA) injection 10 mg (has no administration in time range)     Labs Ordered and Resulted from Time of ED Arrival to Time of ED Departure - No data to display  No orders to display          Critical care was not performed.     Medical Decision Making  The patient's presentation was of high complexity (a chronic illness severe exacerbation, progression, or side effect of treatment).    The patient's evaluation involved:  an assessment requiring an independent historian (see separate area of note for details)  review of external note(s) from 3+ sources (see separate area of note for details)    The patient's management necessitated high risk (a decision regarding hospitalization).      Assessment & Plan    Patient is here for a mental health assessment. She is well-known to us due to her previous multiple ED visits. She has history of intellectual disability and very poor coping and seeks the ED for support. She however gets labile and aggressive when she does not get her needs met in the ED. This has been an ongoing cycle. She is refusing to engage with the  nor myself. She appears at baseline albeit at elevated risk due to chronic SI. Patient did receive a dose of Zyprexa 10 mg which hopefully will allow her to feel calmer. It would be detrimental and enabling to continue to allow her to stay in the ED until she sees fit to leave and worse to admit her. She was encouraged to return to her group home and work with her outpatient established care team.    An ambulance was arranged for transportation back to her group home.    I have reviewed the nursing notes. I have reviewed the findings, diagnosis, plan and need for follow up with the patient.    New Prescriptions    No medications on file       Final diagnoses:   Intellectual  disability   Acute reaction to stress       Magno Sena MD  [facility] EMERGENCY DEPARTMENT  6/20/2023     Magno Sena MD  06/20/23 0386

## 2023-06-20 NOTE — CONSULTS
"Writer placed phone call to pt's  and spoke to Arlene [phone]. She reported she \"was not aware Sadaf was in the ER\". She reported after patient arrived home from the [facility] ER last night she was \"happy and dancing and content\". Requested to get update if pt discharges and requested pt discharge home in a cab as pt often refuses to get out of stretcher when she is sent back via EMS.     Acknowledging DEC order. Pt refused to meet w/ writer to complete DEC assessment at 12:30PM. Writer informed MD.    12:50PM: Writer informed pt's  [phone] that pt is returning to  within the hour.    CHERI Baltazar  DEC - Triage & Transition Services  Callback: [phone]        "

## 2023-06-20 NOTE — TELEPHONE ENCOUNTER
"Patient states she was seen in the ED today.  Patient feels she should have not have been discharged.  Patient states on the way home she wanted to bite herself.    Patient is angry and wanted to speak to security.  I explained they are there to keep people safe, they would not be people that she would want to express her concerns to.  I spoke to a ED staff member they said patient can come in again but will likely be discharged.    Talked with patient.  I explained if she feels like she is going to harm herself she needs to go back in.  If she doesn't then to try to rest.  Patient states she will rest and see how she feels in the morning. Patient doesn't feel her medication is working.  I explained that is something she needs to discuss with her doctor and psychologist.  Patient states she never remembers.  Discussed writing down a list of things she feels right now:  Know in stomach  Feels like \"shit\"  Stomach pain  Wants to bite herself  Not happy  Will have some thoughts on suicide    Patient states she will follow care advise.    Vanda Borjas RN   06/19/23 10:56 PM  [facility] Nurse Advisor    Reason for Disposition    [1] Self-harm (cutting) BUT [2] no thoughts or threats of suicide    Additional Information    Negative: [1] Patient attempted suicide recently AND [2] unwilling to go to the ER    Negative: [1] Patient is threatening suicide today AND [2] unwilling to go to the ER    Negative: Acts or talks confused    Negative: Sounds like a life-threatening emergency to the triager    Negative: Suicidal threats, thoughts or behavior are the main concern    Negative: [1] Postpartum depression AND [2] NO suicidal thoughts    Negative: Homicidal behavior is the main concern    Negative: [1] Substance abuse suspected AND [2] symptoms now    Negative: Mental health hospital admission needed in the past and depression symptoms are similar    Negative: Patient is extremely upset (e.g. can't be calmed " down)    Negative: Depression worsening or sounds severe to triager (patient is withdrawn and has dropped out of several normal activities, sleeping poorly, less able to do activities of daily living)    Negative: Child sounds very sick or weak to the triager    Negative: [1] Concerns about depression previously diagnosed BUT [2] not getting worse    Negative: [1] Takes a psychiatric medicine AND [2] has questions about it    Negative: Caller wants a referral to a mental health specialist    Negative: Depression keeps from going to school or other normal activities    Negative: Depression interferes with sleep    Protocols used: DEPRESSION-P-AH

## 2023-06-20 NOTE — DISCHARGE INSTRUCTIONS
Aftercare Plan    If I am feeling unsafe or I am in a crisis, I will:     Call the National Suicide Prevention Lifeline (335) or the crisis text line (583943).   Go to the nearest emergency room.   Call 169.     Warning signs that I or other people might notice when a crisis is developing for me: Having a plan to hurt myself or others    Things I am able to do on my own to cope or help me feel better:   -Take a deep breath and sit down if needed. Think before acting.   -I can try practicing square breathing when I begin to feel anxious - inhale through the nose for the count of 4 and the first line on the square. Exhale through the mouth for the count of 4 for the second line of the square. Repeat to complete the square. Repeat the square as many times as needed.  -I can also use my five senses to practice mindfulness and grounding. What are five things I can see, four things I can hear, three things I can feel, two things I can smell, and one thing I can taste.  -Download a meditation kervin and spend 15-20 minutes per day mediating/relaxing. Some apps to download include Calm, Headspace, and Insight Timer. All of these apps have free version.     Things that I am able to do with others to cope or help me better:   -Commit to 30 minutes of self care daily. This can be as simple as taking a shower, going for a walk, cooking a meal, reading, writing, etc.   -I can also use community resources including mental health hotlines, [facility] crisis teams, or apps.     Things I can use or do for distraction:   -Call a friend or family member.   -Spend time outside.   -Go for a walk.   -Exercise  -Do chores.   -Do a project or favorite activity.   -Listen to music.   -Read.   -Journal your feelings.   -I can also download a meditation or relaxation kervin, like Calm, Headspace, or Insight Timer (all three offer a free version).   -A great website resource is Change to Chill with active coping skills.     Changes I can make to support  "my mental health and wellness:   -Get at least 6-8 hours of sleep each night.   -Eat 3 nutritious meals per day.   -Take all of your medications as prescribed.   -Attend scheduled mental health therapy and psychiatric appointments and follow all recommendations.   -Maintain a daily schedule/routine.   -Practice deep breathing skills.   -Refrain from taking mood altering chemicals not currently prescribed to me.     People in my life that I can ask for help: My group home staff, my therapist    Your [facility] has a mental health crisis team you can call 24/7: [facility]  [phone]     Other things that are important when I'm in crisis: Remember that the feelings I am having right now are temporary and it won't feel like this forever. It is okay and important to ask for help.     Crisis Lines  Crisis Text Line  Text 174605  You will be connected with a trained live crisis counselor to provide support.  Dudley handyanol, texto  SIRENA a 591255 o texto a 442-AYUDAME en WhatsApp  The Mikey Project (LGBTQ Youth Crisis Line)  1.072.896.9002  text START to 933-369    [employer]  Fast Tracker  Linking people to mental health and substance use disorder resources  CoaLogixtrackUman Pharman.org   [place] [facility] Warm Line  Peer to peer support  Monday thru Saturday, 12 pm to 10 pm  717.790.4010 or 6.132.552.4093  Text \"Support\" to 02576  National [place] on Mental Illness (MAGY)  900.484.7961 or 1.888.MAGY.HELPS    Mental Health Apps (all of these apps have a free version)  My3  https://my3app.org/  VirtualHopeBox  https://Intuit.org/apps/virtual-hope-box/  Calm  Headspace  Insight Timer  Calm Harm    Crisis Lines  Crisis Text Line  Text 030463  You will be connected with a trained live crisis counselor to provide support.  Por espanol, texto  SIRENA a 657996 o texto a 442-AYUDAME en WhatsApp  National Hope Line  1.800.SUICIDE [2465677]    Community Resources  Fast Tracker  Linking " "people to mental health and substance use disorder resources  fastGoodBellyckDatanyzen.Zooppa   [place] [facility] Warm Line  Peer to peer support  Monday thru Saturday, 12 pm to 10 pm  005.363.5331 or 9.581.360.3767  Text \"Support\" to 10176  National [place] on Mental Illness (MAGY)  314.232.2219 or 1.888.MAGY.HELPS    Additional Information  Today you were seen by a licensed mental health professional through Triage and Transition services, Behavioral Healthcare Providers ([facility])  for a crisis assessment in the Emergency Department at [facility].  It is recommended that you follow up with your established providers (psychiatrist, mental health therapist, and/or primary care doctor - as relevant) as soon as possible. Coordinators from [facility] will be calling you in the next 24-48 hours to ensure that you have the resources you need.  You can also contact [facility] coordinators directly at [phone]. You may have been scheduled for or offered an appointment with a mental health provider. [facility] maintains an extensive network of licensed behavioral health providers to connect patients with the services they need.  We do not charge providers a fee to participate in our referral network.  We match patients with providers based on a patient's specific needs, insurance coverage, and location.  Our first effort will be to refer you to a provider within your care system, and will utilize providers outside your care system as needed.     "

## 2023-06-20 NOTE — ED TRIAGE NOTES
Pt presents to triage crying  And upset due to thoughts of suicidal and nobody caring.  Pt was here yesterday and was sent home despite her not feeling safe.  Pt states she has plan to hurt herself via biting, She has visible bite bianca on right hand.      Triage Assessment     Row Name 06/20/23 1212       Triage Assessment (Adult)    Airway WDL WDL       Respiratory WDL    Respiratory WDL WDL       Skin Circulation/Temperature WDL    Skin Circulation/Temperature WDL WDL       Cardiac WDL    Cardiac WDL WDL       Peripheral/Neurovascular WDL    Peripheral Neurovascular WDL WDL       Cognitive/Neuro/Behavioral WDL    Cognitive/Neuro/Behavioral WDL WDL

## 2023-06-20 NOTE — TELEPHONE ENCOUNTER
Sadaf calls and says that she woke up and feels as if she is possessed. Pt. Says that she was at the hospital yesterday. Pt. Says that she is confused, is sweating, and has lower abdominal pain. As this nurse began to triage pt's symptom, pt. Hung up, thus ending this call. RN called pt. Back and only received a generic answering message. Because of this, RN did not leave a message for pt.     Reason for Disposition    Health Information question, no triage required and triager able to answer question    Additional Information    Negative: [1] Caller is not with the adult (patient) AND [2] reporting urgent symptoms    Negative: Lab result questions    Negative: Medication questions    Negative: Caller can't be reached by phone    Negative: Caller has already spoken to PCP or another triager    Negative: RN needs further essential information from caller in order to complete triage    Negative: Requesting regular office appointment    Negative: [1] Caller requesting NON-URGENT health information AND [2] PCP's office is the best resource    Protocols used: INFORMATION ONLY CALL - NO TRIAGE-A-

## 2023-06-20 NOTE — ED PROVIDER NOTES
"ED Provider Note  [facility]      History     Chief Complaint   Patient presents with     Suicidal     \"Something doesn't feel right.  I'm freaking out.\"  No Plan.     HPI  Sadaf Ross is a 22 year old female who is here via EMS from her group home as she had called the nurses line due to feeling depressed and waited until 5 pm to get transportation. Patient was seen here yesterday, feeling upset as it was Father's day and she was triggered by the holiday as her father  over a year ago. Patient was discharged back to her group home. She comes in today as she feels upset. She was refusing to engage with the therapist or myself, repeatedly saying \"Go away, or leave me alone.\"    A psych was holding her hand which she seems to enjoy.     Past Medical History  Past Medical History:   Diagnosis Date     ADHD (attention deficit hyperactivity disorder)      Bipolar 1 disorder (H)      Borderline personality disorder (H)      Depression      Depressive disorder      Intellectual disability      Obesity      Syncope      Past Surgical History:   Procedure Laterality Date     APPENDECTOMY       APPENDECTOMY       acetaminophen (TYLENOL) 325 MG tablet  albuterol (PROAIR HFA/PROVENTIL HFA/VENTOLIN HFA) 108 (90 Base) MCG/ACT inhaler  alum & mag hydroxide-simethicone (MAALOX  ES) 400-400-40 MG/5ML SUSP suspension  ARIPiprazole lauroxil ER (ARISTADA) 882 MG/3.2ML intra-muscular  benzonatate (TESSALON) 200 MG capsule  benztropine (COGENTIN) 1 MG tablet  bisacodyl (DULCOLAX) 5 MG EC tablet  calcium carbonate (TUMS) 500 MG chewable tablet  clobetasol (TEMOVATE) 0.05 % CREA cream  cyclobenzaprine (FLEXERIL) 10 MG tablet  diclofenac (VOLTAREN) 1 % topical gel  docusate sodium (COLACE) 50 MG capsule  fluticasone (FLONASE) 50 MCG/ACT nasal spray  guaiFENesin (MUCINEX) 600 MG 12 hr tablet  hydrocortisone (CORTAID) 0.5 % external cream  hydrocortisone, Perianal, (HYDROCORTISONE) 2.5 % " cream  hydrOXYzine (ATARAX) 50 MG tablet  hyoscyamine (LEVSIN/SL) 0.125 MG sublingual tablet  ibuprofen (ADVIL/MOTRIN) 400 MG tablet  loperamide (IMODIUM) 2 MG capsule  loratadine (CLARITIN) 10 MG tablet  LORazepam (ATIVAN) 0.5 MG tablet  multivitamin, therapeutic (THERA-VIT) TABS tablet  mupirocin (BACTROBAN) 2 % external ointment  OLANZapine zydis (ZYPREXA) 5 MG ODT  omeprazole (PRILOSEC) 40 MG DR capsule  ondansetron (ZOFRAN) 4 MG tablet  ondansetron (ZOFRAN) 4 MG tablet  polyethylene glycol (MIRALAX) 17 GM/Dose powder  prochlorperazine (COMPAZINE) 10 MG tablet  promethazine (PHENERGAN) 12.5 MG tablet  sennosides (SENOKOT) 8.6 MG tablet  sulfamethoxazole-trimethoprim (BACTRIM DS) 800-160 MG tablet  traZODone (DESYREL) 50 MG tablet  venlafaxine (EFFEXOR-ER) 225 MG 24 hr tablet  Vitamin D, Cholecalciferol, 25 MCG (1000 UT) TABS      Allergies   Allergen Reactions     Penicillins Rash and Unknown     Family History  Family History   Problem Relation Age of Onset     Diabetes Type 1 Father      Cancer Paternal Grandfather      Social History   Social History     Tobacco Use     Smoking status: Every Day     Packs/day: 1.00     Years: 5.00     Pack years: 5.00     Types: Vaping Device, Cigarettes     Smokeless tobacco: Never   Substance Use Topics     Alcohol use: No     Drug use: No         A medically appropriate review of systems was performed with pertinent positives and negatives noted in the HPI, and all other systems negative.    Physical Exam   BP: 133/84  Pulse: 104  Temp: 97.9  F (36.6  C)  Resp: 16  SpO2: 98 %  Physical Exam  Vitals and nursing note reviewed.   HENT:      Head: Normocephalic.   Eyes:      Pupils: Pupils are equal, round, and reactive to light.   Pulmonary:      Effort: Pulmonary effort is normal.   Musculoskeletal:         General: Normal range of motion.      Cervical back: Normal range of motion.   Neurological:      General: No focal deficit present.      Mental Status: She is alert.    Psychiatric:         Attention and Perception: She does not perceive auditory or visual hallucinations.         Mood and Affect: Mood normal.         Speech: Speech normal.         Behavior: Behavior is withdrawn.         Thought Content: Thought content normal. Thought content is not paranoid or delusional. Thought content does not include homicidal ideation.         Cognition and Memory: Cognition and memory normal.         Judgment: Judgment is inappropriate.           ED Course, Procedures, & Data      Procedures          Mental Health Risk Assessment      PSS-3    Date and Time Over the past 2 weeks have you felt down, depressed, or hopeless? Over the past 2 weeks have you had thoughts of killing yourself? Have you ever attempted to kill yourself? When did this last happen? User   06/19/23 1714 yes yes yes more than 6 months ago [facility]      C-SSRS ([place])    Date and Time Q1 Wished to be Dead (Past Month) Q2 Suicidal Thoughts (Past Month) Q3 Suicidal Thought Method Q4 Suicidal Intent without Specific Plan Q5 Suicide Intent with Specific Plan Q6 Suicide Behavior (Lifetime) Within the Past 3 Months? RETIRED: Level of Risk per Screen Screening Not Complete User   06/19/23 1804 yes yes yes no yes yes -- -- -- MEK   06/19/23 1714 yes yes yes no no yes -- -- -- MRB              Suicide assessment completed by mental health (D.E.C., LCSW, etc.)       No results found for any visits on 06/19/23.  Medications   OLANZapine zydis (zyPREXA) ODT tab 10 mg (10 mg Oral $Given 6/19/23 8754)     Labs Ordered and Resulted from Time of ED Arrival to Time of ED Departure - No data to display  No orders to display          Critical care was not performed.     Medical Decision Making  The patient's presentation was of high complexity (a chronic illness severe exacerbation, progression, or side effect of treatment).    The patient's evaluation involved:  an assessment requiring an independent historian (see separate area of note  "for details)  review of external note(s) from 2 sources (see separate area of note for details)    The patient's management necessitated high risk (a decision regarding hospitalization).      Assessment & Plan    Patient is here as she was feeling upset at her group home and was wanting to be seen in the ED. She was seen here yesterday and discharged home. Patient is well-known to us from her multiple ED visits. She exhibits avoidance behavior of being at her group home when she feels she needs more support and attention from staff here, notably the psych techs. Patient appears at baseline and exhibiting secondary gain of getting attention from staff here. She was told that she will be returning to her group home via ambulance.    Ambulance staff knew her and successfully was able to transport her back to her group home. Patient did mention that she \"will be back\" when she was carted off to the ambulance.    I have reviewed the nursing notes. I have reviewed the findings, diagnosis, plan and need for follow up with the patient.    New Prescriptions    No medications on file       Final diagnoses:   Intellectual disability   Borderline personality disorder (H)       Magno Sena MD  [facility] EMERGENCY DEPARTMENT  6/19/2023     Magno Sena MD  06/19/23 9789    "

## 2023-06-21 ENCOUNTER — NURSE TRIAGE (OUTPATIENT)
Dept: FAMILY MEDICINE | Facility: CLINIC | Age: 23
End: 2023-06-21
Payer: MEDICARE

## 2023-06-21 ENCOUNTER — HOSPITAL ENCOUNTER (EMERGENCY)
Facility: CLINIC | Age: 23
Discharge: HOME | End: 2023-06-21
Payer: MEDICARE

## 2023-06-21 NOTE — TELEPHONE ENCOUNTER
"Patient calling via priority line. Patient expressed that she \"feels possessed\" and is sweating, hurting, confused and tired. RN noted multiple other encounters for a similar situation. RN discussed with patient about visit to ED yesterday and mentioned that patient needed to follow up with primary doctor. Patient stated she has called to get in but they are booked out months and it is not with [facility].     RN suggested patient could call primary back and see if they are able to get her in sooner. However, with patient stating she is currently having symptoms, RN advised she return to ED. Patient understood and agreed. She lives at a group home and was going to coordinate with them to get a ride to ED.     PHILL Ying, RN       Reason for Disposition    Very strange or paranoid behavior    Additional Information    Negative: [1] Difficult to awaken or acting confused (e.g., disoriented, slurred speech) AND [2] present now AND [3] diabetes mellitus    Negative: [1] Difficult to awaken or acting confused (e.g., disoriented, slurred speech) AND [2] present now AND [3] new-onset    Negative: [1] Weakness of the face, arm, or leg on one side of the body AND [2] new-onset    Negative: [1] Numbness of the face, arm, or leg on one side of the body AND [2] new-onset    Negative: [1] Loss of speech or garbled speech AND [2] new-onset    Negative: Difficulty breathing or bluish lips    Negative: Shock suspected (e.g., cold/pale/clammy skin, too weak to stand, low BP, rapid pulse)    Negative: Seeing, hearing, or feeling things that are not there (i.e., visual, auditory, or tactile hallucinations)    Negative: Followed a head injury    Negative: Drug overdose suspected    Negative: Sounds like a life-threatening emergency to the triager    Negative: Questions or concerns about alcohol use, unhealthy alcohol use, binge drinking, intoxication, or withdrawal    Negative: Questions or concerns about substance use (drug " use), unhealthy drug use, intoxication, or withdrawal    Negative: [1] Diabetes mellitus AND [2] confusion from low blood sugar (i.e., < 60 mg/dl or 3.5 mmol/l)    Protocols used: CONFUSION - DELIRIUM-A-AH

## 2023-06-22 ENCOUNTER — HOSPITAL ENCOUNTER (EMERGENCY)
Facility: CLINIC | Age: 23
Discharge: HOME | End: 2023-06-22
Attending: EMERGENCY MEDICINE | Admitting: EMERGENCY MEDICINE
Payer: MEDICARE

## 2023-06-22 VITALS
TEMPERATURE: 98.3 F | HEART RATE: 60 BPM | WEIGHT: 246.5 LBS | DIASTOLIC BLOOD PRESSURE: 62 MMHG | SYSTOLIC BLOOD PRESSURE: 124 MMHG | OXYGEN SATURATION: 98 % | BODY MASS INDEX: 42.08 KG/M2 | HEIGHT: 64 IN | RESPIRATION RATE: 14 BRPM

## 2023-06-22 DIAGNOSIS — M54.50 MIDLINE LOW BACK PAIN WITHOUT SCIATICA, UNSPECIFIED CHRONICITY: ICD-10-CM

## 2023-06-22 LAB
ALBUMIN UR-MCNC: NEGATIVE MG/DL
APPEARANCE UR: CLEAR
BACTERIA #/AREA URNS HPF: ABNORMAL /HPF
BILIRUB UR QL STRIP: NEGATIVE
COLOR UR AUTO: ABNORMAL
GLUCOSE UR STRIP-MCNC: NEGATIVE MG/DL
HCG UR QL: NEGATIVE
HGB UR QL STRIP: NEGATIVE
KETONES UR STRIP-MCNC: NEGATIVE MG/DL
LEUKOCYTE ESTERASE UR QL STRIP: NEGATIVE
MUCOUS THREADS #/AREA URNS LPF: PRESENT /LPF
NITRATE UR QL: NEGATIVE
PH UR STRIP: 5 [PH] (ref 5–7)
RBC URINE: 2 /HPF
SP GR UR STRIP: 1.01 (ref 1–1.03)
SQUAMOUS EPITHELIAL: 1 /HPF
UROBILINOGEN UR STRIP-MCNC: NORMAL MG/DL
WBC URINE: 1 /HPF

## 2023-06-22 PROCEDURE — 81025 URINE PREGNANCY TEST: CPT | Performed by: EMERGENCY MEDICINE

## 2023-06-22 PROCEDURE — 99283 EMERGENCY DEPT VISIT LOW MDM: CPT | Performed by: EMERGENCY MEDICINE

## 2023-06-22 PROCEDURE — 81001 URINALYSIS AUTO W/SCOPE: CPT | Performed by: EMERGENCY MEDICINE

## 2023-06-22 ASSESSMENT — ACTIVITIES OF DAILY LIVING (ADL)
ADLS_ACUITY_SCORE: 37
ADLS_ACUITY_SCORE: 37

## 2023-06-22 NOTE — ED NOTES
"Discharge report given to Arlene,  pt facility staff at [phone]. Per Arlene, \"there's nothing wrong with Sadaf.\" Pt will be transported to her [facility] via EMS for safety purpose. Pt expresses symptoms of SI, \" I want to bite myself.\"  "

## 2023-06-22 NOTE — ED TRIAGE NOTES
Pt presents for body pain; generalized body aches that started a week ago; difficulty to fall asleep; denies SI thoughts today. Pain is worse in lower back.

## 2023-06-22 NOTE — ED PROVIDER NOTES
"    [facility] EMERGENCY DEPARTMENT ([facility])  6/22/23  Formerly [facility], [facility] I    History     Chief Complaint   Patient presents with     Generalized Body Aches     HPI     Sadaf Ross is a 22 year old female with a past medical history significant for BPD, ADHD, intellectual disability and depression, excessive use of the emergency department (11th visit in the past 22 days), current group home resident, who presents to the Emergency Department for evaluation of back pain.  Patient reports she has had low back pain for the past 2 weeks.  It is located in the low lumbar region and does not radiate anywhere.  She denies any difficulty controlling bowel or bladder.  No leg weakness, no numbness or paresthesias.  She denies any hematuria, dysuria, or urinary urgency though she does report some frequency over the past several days. She is on Depo-Provera and denies possibility of pregnancy.  She denies any fevers or chills, admits to some nasal congestion, particularly at night, denies any sore throat, cough, chest pain, or shortness of breath.  She has some chronic abdominal pain and chronic nausea and vomiting which is an ongoing and unchanged issue for her.  She has been seen by her clinic for this.  In fact, she saw her clinic for her back pain today.  She reports that she was told to take ibuprofen and was given ibuprofen in clinic today.  Patient states that this was not helpful for her.  Record review shows that she has taken Tylenol for this, and reports that Tylenol \"makes me sleepy \".    Patient is well-known to the emergency department, generally coming in for mental health/suicidal ideation.  She is not seeking any mental health evaluation today.    Past Medical History:   Diagnosis Date     ADHD (attention deficit hyperactivity disorder)      Bipolar 1 disorder (H)      Borderline personality disorder (H)      Depression      Depressive disorder      Intellectual disability      Obesity      Syncope  "       Past Surgical History:   Procedure Laterality Date     APPENDECTOMY       APPENDECTOMY         Family History   Problem Relation Age of Onset     Diabetes Type 1 Father      Cancer Paternal Grandfather        Social History     Tobacco Use     Smoking status: Every Day     Packs/day: 1.00     Years: 5.00     Pack years: 5.00     Types: Vaping Device, Cigarettes     Smokeless tobacco: Never   Substance Use Topics     Alcohol use: No       Past Medical History  Past Medical History:   Diagnosis Date     ADHD (attention deficit hyperactivity disorder)      Bipolar 1 disorder (H)      Borderline personality disorder (H)      Depression      Depressive disorder      Intellectual disability      Obesity      Syncope      Past Surgical History:   Procedure Laterality Date     APPENDECTOMY       APPENDECTOMY       acetaminophen (TYLENOL) 325 MG tablet  albuterol (PROAIR HFA/PROVENTIL HFA/VENTOLIN HFA) 108 (90 Base) MCG/ACT inhaler  alum & mag hydroxide-simethicone (MAALOX  ES) 400-400-40 MG/5ML SUSP suspension  ARIPiprazole lauroxil ER (ARISTADA) 882 MG/3.2ML intra-muscular  benzonatate (TESSALON) 200 MG capsule  benztropine (COGENTIN) 1 MG tablet  bisacodyl (DULCOLAX) 5 MG EC tablet  calcium carbonate (TUMS) 500 MG chewable tablet  clobetasol (TEMOVATE) 0.05 % CREA cream  cyclobenzaprine (FLEXERIL) 10 MG tablet  diclofenac (VOLTAREN) 1 % topical gel  docusate sodium (COLACE) 50 MG capsule  fluticasone (FLONASE) 50 MCG/ACT nasal spray  guaiFENesin (MUCINEX) 600 MG 12 hr tablet  hydrocortisone (CORTAID) 0.5 % external cream  hydrocortisone, Perianal, (HYDROCORTISONE) 2.5 % cream  hydrOXYzine (ATARAX) 50 MG tablet  hyoscyamine (LEVSIN/SL) 0.125 MG sublingual tablet  ibuprofen (ADVIL/MOTRIN) 400 MG tablet  loperamide (IMODIUM) 2 MG capsule  loratadine (CLARITIN) 10 MG tablet  LORazepam (ATIVAN) 0.5 MG tablet  multivitamin, therapeutic (THERA-VIT) TABS tablet  mupirocin (BACTROBAN) 2 % external ointment  OLANZapine zydis  "(ZYPREXA) 5 MG ODT  omeprazole (PRILOSEC) 40 MG DR capsule  ondansetron (ZOFRAN) 4 MG tablet  ondansetron (ZOFRAN) 4 MG tablet  polyethylene glycol (MIRALAX) 17 GM/Dose powder  prochlorperazine (COMPAZINE) 10 MG tablet  promethazine (PHENERGAN) 12.5 MG tablet  sennosides (SENOKOT) 8.6 MG tablet  sulfamethoxazole-trimethoprim (BACTRIM DS) 800-160 MG tablet  traZODone (DESYREL) 50 MG tablet  venlafaxine (EFFEXOR-ER) 225 MG 24 hr tablet  Vitamin D, Cholecalciferol, 25 MCG (1000 UT) TABS      Allergies   Allergen Reactions     Penicillins Rash and Unknown     Family History  Family History   Problem Relation Age of Onset     Diabetes Type 1 Father      Cancer Paternal Grandfather      Social History   Social History     Tobacco Use     Smoking status: Every Day     Packs/day: 1.00     Years: 5.00     Pack years: 5.00     Types: Vaping Device, Cigarettes     Smokeless tobacco: Never   Substance Use Topics     Alcohol use: No     Drug use: No         A medically appropriate review of systems was performed with pertinent positives and negatives noted in the HPI, and all other systems negative.    Physical Exam   BP: 124/62  Pulse: 60  Temp: 99.3  F (37.4  C)  Resp: 14  Height: 162.6 cm (5' 4\")  Weight: 111.8 kg (246 lb 8 oz)  SpO2: 98 %  Physical Exam  Vitals and nursing note reviewed.   Constitutional:       General: She is not in acute distress.     Appearance: She is not diaphoretic.      Comments: Adult female, sitting in chair, no acute distress   HENT:      Head: Atraumatic.      Mouth/Throat:      Mouth: Mucous membranes are moist.      Pharynx: Oropharynx is clear. No oropharyngeal exudate.   Eyes:      General: No scleral icterus.     Pupils: Pupils are equal, round, and reactive to light.   Cardiovascular:      Rate and Rhythm: Normal rate.      Pulses: Normal pulses.      Heart sounds: Normal heart sounds. No murmur heard.  Pulmonary:      Effort: No respiratory distress.      Breath sounds: Normal breath " sounds.   Abdominal:      General: Bowel sounds are normal.      Palpations: Abdomen is soft.      Tenderness: There is no abdominal tenderness. There is no guarding or rebound.      Comments: Obese, soft, nontender to palpation, normal bowel sounds   Musculoskeletal:         General: No tenderness.      Right lower leg: No edema.      Left lower leg: No edema.      Comments: Some mild midline low lumbar TTP   Skin:     General: Skin is warm.      Findings: No rash.   Neurological:      Mental Status: She is alert.      Comments: Lower extremities: normal sensation to light touch. No saddle anesthesia.  Normal plantarflexion/dorsiflexion B. Gait normal.            ED Course, Procedures, & Data      Procedures          Results for orders placed or performed during the hospital encounter of 06/22/23   UA with Microscopic reflex to Culture     Status: Abnormal    Specimen: Urine, Clean Catch   Result Value Ref Range    Color Urine Straw Colorless, Straw, Light Yellow, Yellow    Appearance Urine Clear Clear    Glucose Urine Negative Negative mg/dL    Bilirubin Urine Negative Negative    Ketones Urine Negative Negative mg/dL    Specific Gravity Urine 1.011 1.003 - 1.035    Blood Urine Negative Negative    pH Urine 5.0 5.0 - 7.0    Protein Albumin Urine Negative Negative mg/dL    Urobilinogen Urine Normal Normal, 2.0 mg/dL    Nitrite Urine Negative Negative    Leukocyte Esterase Urine Negative Negative    Bacteria Urine Few (A) None Seen /HPF    Mucus Urine Present (A) None Seen /LPF    RBC Urine 2 <=2 /HPF    WBC Urine 1 <=5 /HPF    Squamous Epithelials Urine 1 <=1 /HPF    Narrative    Urine Culture not indicated   HCG qualitative urine     Status: Normal   Result Value Ref Range    hCG Urine Qualitative Negative Negative     Medications - No data to display  Labs Ordered and Resulted from Time of ED Arrival to Time of ED Departure   ROUTINE UA WITH MICROSCOPIC REFLEX TO CULTURE - Abnormal       Result Value    Color  Urine Straw      Appearance Urine Clear      Glucose Urine Negative      Bilirubin Urine Negative      Ketones Urine Negative      Specific Gravity Urine 1.011      Blood Urine Negative      pH Urine 5.0      Protein Albumin Urine Negative      Urobilinogen Urine Normal      Nitrite Urine Negative      Leukocyte Esterase Urine Negative      Bacteria Urine Few (*)     Mucus Urine Present (*)     RBC Urine 2      WBC Urine 1      Squamous Epithelials Urine 1     HCG QUALITATIVE URINE - Normal    hCG Urine Qualitative Negative       No orders to display          Critical care was not performed.     Medical Decision Making  The patient's presentation was of low complexity (an acute and uncomplicated illness or injury).    The patient's evaluation involved:  ordering and/or review of 1 test(s) in this encounter (see separate area of note for details)    The patient's management necessitated only low risk treatment.      Assessment & Plan    Patient presents to the emergency department today for the above complaints.  On my evaluation, she is alert, cooperative, no acute distress.  She has mild tenderness to palpation in the midline of the low lumbar spine.  She is neurologically intact.      I did attempt to review the note from her primary clinic as she was seen by her primary clinic for low back pain just earlier today, but the note is not yet complete.  I do see that she received ibuprofen in clinic for her low back pain.    I do not have any concern for cauda equina.  She does not have any radicular symptoms.  She has urinary frequency.  I think it is reasonable to check a UA on her which she did agree to, UA shows no sign of infection.  UPT negative.  Patient was advised to continue taking Tylenol and ibuprofen as needed for likely musculoskeletal low back pain.  I have advised her to follow-up with her primary clinic if she is not noticing any improvement, as she may potentially benefit from a referral for physical  therapy at that time.  Patient verbalizes understanding.    I have reviewed the nursing notes. I have reviewed the findings, diagnosis, plan and need for follow up with the patient.    Discharge Medication List as of 6/22/2023  7:27 PM          Final diagnoses:   Midline low back pain without sciatica, unspecified chronicity       Deepa Pelaez MD  [facility] EMERGENCY DEPARTMENT  6/22/2023     Deepa Pelaez MD  06/23/23 0001

## 2023-06-22 NOTE — DISCHARGE INSTRUCTIONS
You have been seen in the emergency department today for your back pain.  There is no sign of infection on your urine test.  We recommend that you continue to use Tylenol and ibuprofen as needed.  If your symptoms continue, you should follow-up with your primary clinic.  They could potentially refer you for physical therapy if needed.

## 2023-06-23 ENCOUNTER — HOSPITAL ENCOUNTER (EMERGENCY)
Facility: CLINIC | Age: 23
Discharge: HOME | End: 2023-06-23
Attending: EMERGENCY MEDICINE | Admitting: EMERGENCY MEDICINE
Payer: MEDICARE

## 2023-06-23 ENCOUNTER — NURSE TRIAGE (OUTPATIENT)
Dept: NURSING | Facility: CLINIC | Age: 23
End: 2023-06-23
Payer: MEDICARE

## 2023-06-23 VITALS
OXYGEN SATURATION: 100 % | SYSTOLIC BLOOD PRESSURE: 129 MMHG | DIASTOLIC BLOOD PRESSURE: 83 MMHG | WEIGHT: 242 LBS | BODY MASS INDEX: 41.54 KG/M2 | TEMPERATURE: 99.2 F | HEART RATE: 96 BPM | RESPIRATION RATE: 16 BRPM

## 2023-06-23 DIAGNOSIS — F79 INTELLECTUAL DISABILITY: ICD-10-CM

## 2023-06-23 DIAGNOSIS — F41.9 ANXIETY: ICD-10-CM

## 2023-06-23 DIAGNOSIS — F60.3 BORDERLINE PERSONALITY DISORDER (H): ICD-10-CM

## 2023-06-23 PROCEDURE — 250N000013 HC RX MED GY IP 250 OP 250 PS 637: Performed by: EMERGENCY MEDICINE

## 2023-06-23 PROCEDURE — 99284 EMERGENCY DEPT VISIT MOD MDM: CPT | Performed by: EMERGENCY MEDICINE

## 2023-06-23 PROCEDURE — 99283 EMERGENCY DEPT VISIT LOW MDM: CPT | Performed by: EMERGENCY MEDICINE

## 2023-06-23 RX ORDER — OLANZAPINE 10 MG/1
10 TABLET, ORALLY DISINTEGRATING ORAL ONCE
Status: COMPLETED | OUTPATIENT
Start: 2023-06-23 | End: 2023-06-23

## 2023-06-23 RX ADMIN — OLANZAPINE 10 MG: 10 TABLET, ORALLY DISINTEGRATING ORAL at 15:20

## 2023-06-23 ASSESSMENT — ACTIVITIES OF DAILY LIVING (ADL)
ADLS_ACUITY_SCORE: 35
ADLS_ACUITY_SCORE: 37

## 2023-06-23 NOTE — DISCHARGE INSTRUCTIONS
Return to your group home.      Continue working with your current providers and take your medications as prescribed.

## 2023-06-23 NOTE — ED NOTES
Report given to EMS. Pt discharged back to [facility]. Pt refused vital signs. Pt belongings given to EMS (1 bag) pt aware.

## 2023-06-23 NOTE — TELEPHONE ENCOUNTER
Nurse Triage SBAR    Is this a 2nd Level Triage? NO    Situation: Patient states she wants to hurt herself today. States she feels suicidal today    Background: significant mental health calls- multiple encounters with triage and ER visits    Assessment: states she has been feeling suicidal  Has been dealing with a lot of stress in her life  States I have wanted to kill myself   States her plan is to hit her head against the wall and bite herself   States she has not harmed herself already  States she hasn't started to prepare any thing  States she has been having trouble doing her normal daily activities   States she is not alone  States she saw her therapist-  Yesterday and states they don't know what to do to help her   States she has some chest pain  Denies any chance of pregnancy      Protocol Recommended Disposition:   Go to ED Now    Recommendation: Advised patient be seen in the ER today - reviewed locations of ER and patient will go in to be seen- -states she goes to [facility] normally. Advised her to go there.      ER now    Does the patient meet one of the following criteria for ADS visit consideration? No    Reason for Disposition    Depression symptoms (sadness, hopelessness, decreased energy) and unable to do any normal activities (e.g., self care, school, work; in comparison to baseline).    Additional Information    Negative: Patient attempted suicide    Negative: Patient is threatening suicide now    Negative: Violent behavior, or threatening to physically hurt or kill someone    Negative: Patient is very confused (disoriented, slurred speech) and no other adult (e.g., friend or family member) available    Negative: Difficult to awaken or acting very confused (disoriented, slurred speech) and of new-onset    Negative: Sounds like a life-threatening emergency to the triager    Negative: Depression is main symptom and is not threatening suicide    Protocols used: SUICIDE [id number]

## 2023-06-23 NOTE — ED PROVIDER NOTES
ED Provider Note  [facility]      History     Chief Complaint   Patient presents with     Suicidal     Nausea & Vomiting     Says she vomited in the bathroom once she arrived     HPI  Sadaf Ross is a 22 year old female with a past medical history of bipolar disorder, borderline personality disorder, psychosis, MDD, and excessive ED visits (12th visit in past 23 days) who presents to the Emergency Department seeking evaluation for anxiety which is causing her to vomit. She says she has her suicidal thoughts which are not new. She says she likes the staff at her group home and she has a nephew which she enjoys. Denies any specific trigger. Per group home staff she ate well this morning and has been at her baseline. They did not know she took a ride to the ER today.  Patient is her own guardian.     Past Medical History  Past Medical History:   Diagnosis Date     ADHD (attention deficit hyperactivity disorder)      Bipolar 1 disorder (H)      Borderline personality disorder (H)      Depression      Depressive disorder      Intellectual disability      Obesity      Syncope      Past Surgical History:   Procedure Laterality Date     APPENDECTOMY       APPENDECTOMY       acetaminophen (TYLENOL) 325 MG tablet  albuterol (PROAIR HFA/PROVENTIL HFA/VENTOLIN HFA) 108 (90 Base) MCG/ACT inhaler  alum & mag hydroxide-simethicone (MAALOX  ES) 400-400-40 MG/5ML SUSP suspension  ARIPiprazole lauroxil ER (ARISTADA) 882 MG/3.2ML intra-muscular  benzonatate (TESSALON) 200 MG capsule  benztropine (COGENTIN) 1 MG tablet  bisacodyl (DULCOLAX) 5 MG EC tablet  calcium carbonate (TUMS) 500 MG chewable tablet  clobetasol (TEMOVATE) 0.05 % CREA cream  cyclobenzaprine (FLEXERIL) 10 MG tablet  diclofenac (VOLTAREN) 1 % topical gel  docusate sodium (COLACE) 50 MG capsule  fluticasone (FLONASE) 50 MCG/ACT nasal spray  guaiFENesin (MUCINEX) 600 MG 12 hr tablet  hydrocortisone (CORTAID) 0.5 % external  cream  hydrocortisone, Perianal, (HYDROCORTISONE) 2.5 % cream  hydrOXYzine (ATARAX) 50 MG tablet  hyoscyamine (LEVSIN/SL) 0.125 MG sublingual tablet  ibuprofen (ADVIL/MOTRIN) 400 MG tablet  loperamide (IMODIUM) 2 MG capsule  loratadine (CLARITIN) 10 MG tablet  LORazepam (ATIVAN) 0.5 MG tablet  multivitamin, therapeutic (THERA-VIT) TABS tablet  mupirocin (BACTROBAN) 2 % external ointment  OLANZapine zydis (ZYPREXA) 5 MG ODT  omeprazole (PRILOSEC) 40 MG DR capsule  ondansetron (ZOFRAN) 4 MG tablet  ondansetron (ZOFRAN) 4 MG tablet  polyethylene glycol (MIRALAX) 17 GM/Dose powder  prochlorperazine (COMPAZINE) 10 MG tablet  promethazine (PHENERGAN) 12.5 MG tablet  sennosides (SENOKOT) 8.6 MG tablet  sulfamethoxazole-trimethoprim (BACTRIM DS) 800-160 MG tablet  traZODone (DESYREL) 50 MG tablet  venlafaxine (EFFEXOR-ER) 225 MG 24 hr tablet  Vitamin D, Cholecalciferol, 25 MCG (1000 UT) TABS      Allergies   Allergen Reactions     Penicillins Rash and Unknown     Family History  Family History   Problem Relation Age of Onset     Diabetes Type 1 Father      Cancer Paternal Grandfather      Social History   Social History     Tobacco Use     Smoking status: Every Day     Packs/day: 1.00     Years: 5.00     Pack years: 5.00     Types: Vaping Device, Cigarettes     Smokeless tobacco: Never   Substance Use Topics     Alcohol use: No     Drug use: No      Past medical history, past surgical history, medications, allergies, family history, and social history were reviewed with the patient. No additional pertinent items.      A medically appropriate review of systems was performed with pertinent positives and negatives noted in the HPI, and all other systems negative.    Physical Exam   BP: 129/83  Pulse: 96  Temp: 99.2  F (37.3  C)  Resp: 16  Weight: 109.8 kg (242 lb)  SpO2: 100 %  Physical Exam  Vitals and nursing note reviewed.   Constitutional:       General: She is not in acute distress.     Appearance: She is obese. She is  not ill-appearing, toxic-appearing or diaphoretic.   HENT:      Head: Normocephalic and atraumatic.      Nose: No congestion or rhinorrhea.   Eyes:      Extraocular Movements: Extraocular movements intact.   Cardiovascular:      Rate and Rhythm: Normal rate and regular rhythm.   Pulmonary:      Effort: Pulmonary effort is normal.      Breath sounds: Normal breath sounds.   Abdominal:      General: There is no distension.      Palpations: Abdomen is soft.      Tenderness: There is no abdominal tenderness.   Musculoskeletal:         General: No signs of injury.      Cervical back: Normal range of motion.   Skin:     Coloration: Skin is not jaundiced or pale.   Neurological:      General: No focal deficit present.      Mental Status: She is alert and oriented to person, place, and time.   Psychiatric:         Attention and Perception: Attention and perception normal.         Mood and Affect: Mood is anxious.         Speech: Speech normal.         Behavior: Behavior normal. Behavior is cooperative.         Thought Content: Thought content includes suicidal (chronic/baseline) ideation.         Cognition and Memory: Cognition and memory normal.         Judgment: Judgment is impulsive and inappropriate.           ED Course, Procedures, & Data      Procedures         Mental Health Risk Assessment      PSS-3    Date and Time Over the past 2 weeks have you felt down, depressed, or hopeless? Over the past 2 weeks have you had thoughts of killing yourself? Have you ever attempted to kill yourself? When did this last happen? User   06/23/23 7303 yes yes yes within the last month (but not today) KH      C-SSRS ([place])    Date and Time Q1 Wished to be Dead (Past Month) Q2 Suicidal Thoughts (Past Month) Q3 Suicidal Thought Method Q4 Suicidal Intent without Specific Plan Q5 Suicide Intent with Specific Plan Q6 Suicide Behavior (Lifetime) Within the Past 3 Months? RETIRED: Level of Risk per Screen Screening Not Complete User    06/23/23 1453 yes yes yes no yes yes -- -- -- KH                Item Assessment   Suicidal Ideation Suicidal thoughts   Plan none   Intent none   Suicidal or self-harm behaviors none   Risk Factors Previous psychiatric diagnosis and treatments   Protective Factors group home staff/family              No results found for any visits on 06/23/23.  Medications   OLANZapine zydis (zyPREXA) ODT tab 10 mg (10 mg Oral $Given 6/23/23 1520)     Labs Ordered and Resulted from Time of ED Arrival to Time of ED Departure - No data to display  No orders to display              Critical care was not performed.     Medical Decision Making  The patient's presentation was of moderate complexity (a chronic illness mild to moderate exacerbation, progression, or side effect of treatment).    The patient's evaluation involved:  an assessment requiring an independent historian (group home staff)  review of external note(s) from 3+ sources (see separate area of note for details)  review of 2 test result(s) ordered prior to this encounter (up/upt from yesterday's ed visit)     The patient's management necessitated moderate risk (limitations due to social determinants of health (see separate area of note for details)).      Assessment & Plan    Sadaf Ross is a 22 year old female with a past medical history of bipolar disorder, borderline personality disorder, psychosis, MDD, and excessive ED visits (12th visit in past 23 days) who presents to the Emergency Department seeking evaluation for anxiety which is causing her to vomit..  She is well known to the ED and comes frequently.  She has a care plan which was reviewed.  She has chronic si which is unchanged.   Patient was offered zyprexa for her anxiety but says she would throw it up. However, she was offered it by nursing and took it without difficulty. She rested for a bit and was socializing with others. She then announced to nursing she was ready to call for her ride. [facility]  staff has no concerns and is agreeable with her return. She was discharged per her request.     I have reviewed the nursing notes. I have reviewed the findings, diagnosis, plan and need for follow up with the patient.    New Prescriptions    No medications on file       Final diagnoses:   Anxiety   Borderline personality disorder (H)   Intellectual disability   I, Sergio Sargent, am serving as a trained medical scribe to document services personally performed by Ashely Toth MD, based on the provider's statements to me.     I, Ashely Toth MD, was physically present and have reviewed and verified the accuracy of this note documented by Sergio Sargent.      Ashely Toth MD  [facility] EMERGENCY DEPARTMENT  6/23/2023     Ashely Toth MD  06/23/23 9030

## 2023-06-23 NOTE — ED NOTES
Patient reports feeling better , nausea resolved , patient would like to discharge home. MD updated, patient cleared for discharge. Patient called for her own cab ride.

## 2023-06-23 NOTE — ED TRIAGE NOTES
Triage Assessment     Row Name 06/23/23 1454       Triage Assessment (Adult)    Airway WDL WDL       Respiratory WDL    Respiratory WDL WDL       Skin Circulation/Temperature WDL    Skin Circulation/Temperature WDL WDL       Cardiac WDL    Cardiac WDL WDL       Peripheral/Neurovascular WDL    Peripheral Neurovascular WDL WDL       Cognitive/Neuro/Behavioral WDL    Cognitive/Neuro/Behavioral WDL WDL

## 2023-06-23 NOTE — ED NOTES
"Pt requested for her belonging. Pt told she cannot have her belongings until EMS arrived due to a pack of cigarette and a lighter. Pt is upset and stating, \"I need my stuff back because I want to leave.\"   "

## 2023-06-24 ENCOUNTER — HOSPITAL ENCOUNTER (EMERGENCY)
Facility: CLINIC | Age: 23
Discharge: NURSING, REHAB OR LONG-TERM CARE | End: 2023-06-24
Attending: EMERGENCY MEDICINE | Admitting: EMERGENCY MEDICINE
Payer: MEDICARE

## 2023-06-24 VITALS
DIASTOLIC BLOOD PRESSURE: 62 MMHG | OXYGEN SATURATION: 100 % | RESPIRATION RATE: 16 BRPM | HEART RATE: 73 BPM | SYSTOLIC BLOOD PRESSURE: 107 MMHG | TEMPERATURE: 99.1 F

## 2023-06-24 DIAGNOSIS — R45.851 SUICIDAL IDEATION: ICD-10-CM

## 2023-06-24 DIAGNOSIS — F31.9 BIPOLAR DISORDER, UNSPECIFIED (H): ICD-10-CM

## 2023-06-24 PROCEDURE — 99283 EMERGENCY DEPT VISIT LOW MDM: CPT

## 2023-06-24 PROCEDURE — 99283 EMERGENCY DEPT VISIT LOW MDM: CPT | Performed by: EMERGENCY MEDICINE

## 2023-06-24 RX ORDER — OLANZAPINE 10 MG/1
10 TABLET, ORALLY DISINTEGRATING ORAL ONCE
Status: DISCONTINUED | OUTPATIENT
Start: 2023-06-24 | End: 2023-06-24 | Stop reason: HOSPADM

## 2023-06-24 ASSESSMENT — ACTIVITIES OF DAILY LIVING (ADL)
ADLS_ACUITY_SCORE: 37
ADLS_ACUITY_SCORE: 37

## 2023-06-24 NOTE — TELEPHONE ENCOUNTER
Pt is calling in about depression. Pt spoke to a triage nurse yesterday. Triage nurse advised patient to be evaluated within 24 hours if symptoms worsen.   Pt is calling in again feeling worse, feeling suicidal, and feeling like harming herself. Pt was advised to call 911 so care is not delayed. Pt verbalized understanding, and will call 911.     Peter Herrera RN on 6/24/2023 at 3:23 PM
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
## 2023-06-24 NOTE — ED NOTES
Bed: [facility]RED[facility]-A  Expected date: 6/24/23  Expected time: 4:00 PM  Means of arrival:   Comments:  Fadi [id number]  27F  SI, cooperative

## 2023-06-24 NOTE — ED PROVIDER NOTES
ED Provider Note  [facility]      History     Chief Complaint   Patient presents with     Suicidal     Patient here for chronic SI with plan to bite herself.      HPI  Sadaf Ross is a 22 year old female who  has a past medical history of ADHD (attention deficit hyperactivity disorder), Bipolar 1 disorder (H), Borderline personality disorder (H), Depression, Depressive disorder, Intellectual disability, Obesity, and Syncope.  She reports to the emergency department via EMS reporting continued chronic suicidal thoughts, afraid she might bite herself.  She does not indicate that anything specific happened today to make these worse or to drive her to call 911.  She reports she does not want any medication to help.  She states that these are the feelings that she always has.  She has no other complaints at this time.      Past Medical History  Past Medical History:   Diagnosis Date     ADHD (attention deficit hyperactivity disorder)      Bipolar 1 disorder (H)      Borderline personality disorder (H)      Depression      Depressive disorder      Intellectual disability      Obesity      Syncope      Past Surgical History:   Procedure Laterality Date     APPENDECTOMY       APPENDECTOMY       acetaminophen (TYLENOL) 325 MG tablet  albuterol (PROAIR HFA/PROVENTIL HFA/VENTOLIN HFA) 108 (90 Base) MCG/ACT inhaler  alum & mag hydroxide-simethicone (MAALOX  ES) 400-400-40 MG/5ML SUSP suspension  ARIPiprazole lauroxil ER (ARISTADA) 882 MG/3.2ML intra-muscular  benzonatate (TESSALON) 200 MG capsule  benztropine (COGENTIN) 1 MG tablet  bisacodyl (DULCOLAX) 5 MG EC tablet  calcium carbonate (TUMS) 500 MG chewable tablet  clobetasol (TEMOVATE) 0.05 % CREA cream  cyclobenzaprine (FLEXERIL) 10 MG tablet  diclofenac (VOLTAREN) 1 % topical gel  docusate sodium (COLACE) 50 MG capsule  fluticasone (FLONASE) 50 MCG/ACT nasal spray  guaiFENesin (MUCINEX) 600 MG 12 hr tablet  hydrocortisone (CORTAID) 0.5 %  external cream  hydrocortisone, Perianal, (HYDROCORTISONE) 2.5 % cream  hydrOXYzine (ATARAX) 50 MG tablet  hyoscyamine (LEVSIN/SL) 0.125 MG sublingual tablet  ibuprofen (ADVIL/MOTRIN) 400 MG tablet  loperamide (IMODIUM) 2 MG capsule  loratadine (CLARITIN) 10 MG tablet  LORazepam (ATIVAN) 0.5 MG tablet  multivitamin, therapeutic (THERA-VIT) TABS tablet  mupirocin (BACTROBAN) 2 % external ointment  OLANZapine zydis (ZYPREXA) 5 MG ODT  omeprazole (PRILOSEC) 40 MG DR capsule  ondansetron (ZOFRAN) 4 MG tablet  ondansetron (ZOFRAN) 4 MG tablet  polyethylene glycol (MIRALAX) 17 GM/Dose powder  prochlorperazine (COMPAZINE) 10 MG tablet  promethazine (PHENERGAN) 12.5 MG tablet  sennosides (SENOKOT) 8.6 MG tablet  sulfamethoxazole-trimethoprim (BACTRIM DS) 800-160 MG tablet  traZODone (DESYREL) 50 MG tablet  venlafaxine (EFFEXOR-ER) 225 MG 24 hr tablet  Vitamin D, Cholecalciferol, 25 MCG (1000 UT) TABS      Allergies   Allergen Reactions     Penicillins Rash and Unknown     Family History  Family History   Problem Relation Age of Onset     Diabetes Type 1 Father      Cancer Paternal Grandfather      Social History   Social History     Tobacco Use     Smoking status: Every Day     Packs/day: 1.00     Years: 5.00     Pack years: 5.00     Types: Vaping Device, Cigarettes     Smokeless tobacco: Never   Substance Use Topics     Alcohol use: No     Drug use: No      Past medical history, past surgical history, medications, allergies, family history, and social history were reviewed with the patient. No additional pertinent items.      A medically appropriate review of systems was performed with pertinent positives and negatives noted in the HPI, and all other systems negative.    Physical Exam   BP: 115/70  Pulse: 100  Temp: 99.1  F (37.3  C)  Resp: 16  SpO2: 97 %  Physical Exam  Vitals and nursing note reviewed.   Constitutional:       General: She is not in acute distress.     Appearance: She is well-developed. She is not  ill-appearing or toxic-appearing.   HENT:      Head: Normocephalic and atraumatic.   Eyes:      General: No scleral icterus.     Pupils: Pupils are equal, round, and reactive to light.   Cardiovascular:      Rate and Rhythm: Normal rate.   Pulmonary:      Effort: Pulmonary effort is normal. No respiratory distress.   Musculoskeletal:      Cervical back: Normal range of motion and neck supple.   Skin:     General: Skin is warm and dry.      Coloration: Skin is not pale.      Findings: No rash.   Neurological:      Mental Status: She is alert and oriented to person, place, and time.      Sensory: No sensory deficit.   Psychiatric:         Attention and Perception: Attention and perception normal.         Mood and Affect: Affect is blunt.         Speech: Speech normal.         Behavior: Behavior normal.         Thought Content: Thought content is not paranoid or delusional. Thought content includes suicidal ideation. Thought content does not include homicidal ideation. Thought content does not include suicidal plan.           ED Course, Procedures, & Data      Procedures                     No results found for any visits on 06/24/23.  Medications - No data to display  Labs Ordered and Resulted from Time of ED Arrival to Time of ED Departure - No data to display  No orders to display              Assessment & Plan      This patient presented to the emergency department complaining of continued suicidal ideation.  Past records were reviewed as was the emergency care plan.  This does not appear to be a departure from the patient's baseline.  She does not appear to represent an acute danger to herself or others.  She was offered medication but declined this.  She will be discharged back to her group home.    I have reviewed the nursing notes. I have reviewed the findings, diagnosis, plan and need for follow up with the patient.    New Prescriptions    No medications on file       Final diagnoses:   Suicidal ideation -  chronic       Clifford Shah  [facility] EMERGENCY DEPARTMENT  6/24/2023     Clifford Shah MD  06/24/23 [id number]

## 2023-06-24 NOTE — ED TRIAGE NOTES
Triage Assessment     Row Name 06/24/23 8556       Triage Assessment (Adult)    Airway WDL WDL       Respiratory WDL    Respiratory WDL WDL       Skin Circulation/Temperature WDL    Skin Circulation/Temperature WDL WDL       Cardiac WDL    Cardiac WDL WDL       Peripheral/Neurovascular WDL    Peripheral Neurovascular WDL WDL       Cognitive/Neuro/Behavioral WDL    Cognitive/Neuro/Behavioral WDL WDL

## 2023-06-25 ENCOUNTER — NURSE TRIAGE (OUTPATIENT)
Dept: NURSING | Facility: CLINIC | Age: 23
End: 2023-06-25
Payer: MEDICARE

## 2023-06-25 NOTE — TELEPHONE ENCOUNTER
"Nurse Triage SBAR    Is this a 2nd Level Triage? NO    Situation: Patient states she is having trouble with suicidal ideation and feels nauseated this AM    Background: Multiple calls to the nurse line and multiple visits to the ER- patient called earlier this morning and was advised to call 911 which patient states she did but they \"didn't show up\"    Assessment: states she is not feeling the best and like she is going to throw up  States she is feeling suicidal as well   States she wants to harm herself now but when asked again she states that she does not want to harm herself currently   States she wants to bite herself to harm herself  Has a - has support people that she talks to   States she has not attempted to hurt herself today- states she last attempted to harm herself yesterday  States that she lives in a room by herself but there are others around  States she is able to do her normal activities  States she has the suicide prevention and crisis lines in her phone but she doesn't get much out of them          Protocol Recommended Disposition:   See HCP Within 4 Hours (Or PCP Triage)    Recommendation: Advised to be seen today- advised to go to the ER this morning as UC is not available at this time. Patient states she is supposed to see her PCP on July 10th- encouraged her to call her PCP tomorrow and see if she cannot get in sooner to be seen since she has had so many ER visits and calls. Patient is agreeable to doing so. Will go to ER this AM however. Declines additional questions.      ER now    Does the patient meet one of the following criteria for ADS visit consideration? No    Reason for Disposition    [1] Patient is not threatening suicide now BUT [2] had SUICIDAL BEHAVIORS in past 3 months    Additional Information    Negative: Patient attempted suicide    Negative: Patient is threatening suicide now    Negative: Violent behavior, or threatening to physically hurt or kill someone    " Negative: [1] Patient is very confused (disoriented, slurred speech) AND [2] no other adult (e.g., friend or family member) available    Negative: [1] Difficult to awaken or acting very confused (disoriented, slurred speech) AND [2] new-onset    Negative: Sounds like a life-threatening emergency to the triager    Negative: Depression is main symptom and is not threatening suicide    Negative: [1] Depression symptoms (sadness, hopelessness, decreased energy) AND [2] unable to do any normal activities (e.g., self care, school, work; in comparison to baseline).    Negative: Patient sounds very sick or weak to the triager    Negative: [1] Patient is not threatening suicide now BUT [2] has a suicide PLAN (e.g., overdose, gunshot) and ACCESS (e.g., collecting pills, gun in house)    Protocols used: SUICIDE [id number]

## 2023-06-25 NOTE — TELEPHONE ENCOUNTER
Patient calling reports wanting to stab, bite, or choke herself to commit suicide. Reports attempting suicide tonight but would not indicate how she tried to carry this out. Tried calling [phone] crisis line with little help. Advised to call 911 for EMS now with patient reporting she was able and willing to do this now.     Day Caldera RN 06/25/23 12:47 AM   [facility] Triage Nurse Advisor    Reason for Disposition    Patient attempted suicide    Protocols used: SUICIDE [id number]

## 2023-06-26 ENCOUNTER — HOSPITAL ENCOUNTER (EMERGENCY)
Facility: CLINIC | Age: 23
Discharge: HOME | End: 2023-06-26
Attending: FAMILY MEDICINE | Admitting: FAMILY MEDICINE
Payer: MEDICARE

## 2023-06-26 VITALS
TEMPERATURE: 98.8 F | SYSTOLIC BLOOD PRESSURE: 143 MMHG | OXYGEN SATURATION: 100 % | HEART RATE: 108 BPM | RESPIRATION RATE: 16 BRPM | DIASTOLIC BLOOD PRESSURE: 90 MMHG

## 2023-06-26 DIAGNOSIS — F41.1 ANXIETY REACTION: ICD-10-CM

## 2023-06-26 LAB
AMPHETAMINES UR QL SCN: NORMAL
BARBITURATES UR QL SCN: NORMAL
BENZODIAZ UR QL SCN: NORMAL
BZE UR QL SCN: NORMAL
CANNABINOIDS UR QL SCN: NORMAL
HCG UR QL: NEGATIVE
OPIATES UR QL SCN: NORMAL

## 2023-06-26 PROCEDURE — 250N000013 HC RX MED GY IP 250 OP 250 PS 637: Performed by: FAMILY MEDICINE

## 2023-06-26 PROCEDURE — 81025 URINE PREGNANCY TEST: CPT | Performed by: FAMILY MEDICINE

## 2023-06-26 PROCEDURE — 80307 DRUG TEST PRSMV CHEM ANLYZR: CPT | Performed by: FAMILY MEDICINE

## 2023-06-26 PROCEDURE — 99284 EMERGENCY DEPT VISIT MOD MDM: CPT | Performed by: FAMILY MEDICINE

## 2023-06-26 PROCEDURE — 99283 EMERGENCY DEPT VISIT LOW MDM: CPT | Performed by: FAMILY MEDICINE

## 2023-06-26 RX ORDER — HYDROXYZINE HYDROCHLORIDE 25 MG/1
25 TABLET, FILM COATED ORAL ONCE
Status: COMPLETED | OUTPATIENT
Start: 2023-06-26 | End: 2023-06-26

## 2023-06-26 RX ORDER — OLANZAPINE 10 MG/1
10 TABLET, ORALLY DISINTEGRATING ORAL ONCE
Status: COMPLETED | OUTPATIENT
Start: 2023-06-26 | End: 2023-06-26

## 2023-06-26 RX ADMIN — OLANZAPINE 10 MG: 10 TABLET, ORALLY DISINTEGRATING ORAL at 16:30

## 2023-06-26 RX ADMIN — HYDROXYZINE HYDROCHLORIDE 25 MG: 25 TABLET, FILM COATED ORAL at 16:30

## 2023-06-26 ASSESSMENT — ACTIVITIES OF DAILY LIVING (ADL): ADLS_ACUITY_SCORE: 37

## 2023-06-26 NOTE — ED PROVIDER NOTES
"ED Provider Note  [facility]      History     Chief Complaint   Patient presents with     Mental Health Problem     \"I need to speak to my care team, I am having triggers due to family issues.\"     HPI  Sadaf Ross is a 22 year old female who has a history of bipolar disorder, major depression, borderline personality disorder, chronic suicidal ideation, anxiety, and multiple ED visits.  Patient has a care plan well outlined in the chart.  Presents frequently to the ED for both psychiatric and medical complaints (this is the patient's 14th emergency department visit this month).  Today she states she feels she is being \"triggered\" to some information she was given by her grandmother about a cousin who is ill.  The cousin apparently is having some type of heart issues which patient describes as \"multiple heart attacks\".  She states this is making her worry, making her palms sweat, she is having anxiety and racing thoughts.  She states these kind of issues trigger self-harm thoughts.  She states she is here to \"talk to my care team\" about these feelings.    Past Medical History  Past Medical History:   Diagnosis Date     ADHD (attention deficit hyperactivity disorder)      Bipolar 1 disorder (H)      Borderline personality disorder (H)      Depression      Depressive disorder      Intellectual disability      Obesity      Syncope      Past Surgical History:   Procedure Laterality Date     APPENDECTOMY       APPENDECTOMY       acetaminophen (TYLENOL) 325 MG tablet  albuterol (PROAIR HFA/PROVENTIL HFA/VENTOLIN HFA) 108 (90 Base) MCG/ACT inhaler  alum & mag hydroxide-simethicone (MAALOX  ES) 400-400-40 MG/5ML SUSP suspension  ARIPiprazole lauroxil ER (ARISTADA) 882 MG/3.2ML intra-muscular  benzonatate (TESSALON) 200 MG capsule  benztropine (COGENTIN) 1 MG tablet  bisacodyl (DULCOLAX) 5 MG EC tablet  calcium carbonate (TUMS) 500 MG chewable tablet  clobetasol (TEMOVATE) 0.05 % CREA " cream  cyclobenzaprine (FLEXERIL) 10 MG tablet  diclofenac (VOLTAREN) 1 % topical gel  docusate sodium (COLACE) 50 MG capsule  fluticasone (FLONASE) 50 MCG/ACT nasal spray  guaiFENesin (MUCINEX) 600 MG 12 hr tablet  hydrocortisone (CORTAID) 0.5 % external cream  hydrocortisone, Perianal, (HYDROCORTISONE) 2.5 % cream  hydrOXYzine (ATARAX) 50 MG tablet  hyoscyamine (LEVSIN/SL) 0.125 MG sublingual tablet  ibuprofen (ADVIL/MOTRIN) 400 MG tablet  loperamide (IMODIUM) 2 MG capsule  loratadine (CLARITIN) 10 MG tablet  LORazepam (ATIVAN) 0.5 MG tablet  multivitamin, therapeutic (THERA-VIT) TABS tablet  mupirocin (BACTROBAN) 2 % external ointment  OLANZapine zydis (ZYPREXA) 5 MG ODT  omeprazole (PRILOSEC) 40 MG DR capsule  ondansetron (ZOFRAN) 4 MG tablet  ondansetron (ZOFRAN) 4 MG tablet  polyethylene glycol (MIRALAX) 17 GM/Dose powder  prochlorperazine (COMPAZINE) 10 MG tablet  promethazine (PHENERGAN) 12.5 MG tablet  sennosides (SENOKOT) 8.6 MG tablet  sulfamethoxazole-trimethoprim (BACTRIM DS) 800-160 MG tablet  traZODone (DESYREL) 50 MG tablet  venlafaxine (EFFEXOR-ER) 225 MG 24 hr tablet  Vitamin D, Cholecalciferol, 25 MCG (1000 UT) TABS      Allergies   Allergen Reactions     Penicillins Rash and Unknown     Family History  Family History   Problem Relation Age of Onset     Diabetes Type 1 Father      Cancer Paternal Grandfather      Social History   Social History     Tobacco Use     Smoking status: Every Day     Packs/day: 1.00     Years: 5.00     Pack years: 5.00     Types: Vaping Device, Cigarettes     Smokeless tobacco: Never   Substance Use Topics     Alcohol use: No     Drug use: No         A medically appropriate review of systems was performed with pertinent positives and negatives noted in the HPI, and all other systems negative.    Physical Exam   BP: (!) 143/90  Pulse: 108  Temp: 98.8  F (37.1  C)  Resp: 16  SpO2: 100 %  Physical Exam  Vitals and nursing note reviewed.   Constitutional:       General: She  "is not in acute distress.     Appearance: Normal appearance. She is not diaphoretic.   HENT:      Head: Atraumatic.      Mouth/Throat:      Mouth: Mucous membranes are moist.   Eyes:      General: No scleral icterus.     Conjunctiva/sclera: Conjunctivae normal.   Cardiovascular:      Rate and Rhythm: Normal rate.      Heart sounds: Normal heart sounds.   Pulmonary:      Effort: No respiratory distress.      Breath sounds: Normal breath sounds.   Abdominal:      General: Abdomen is flat.   Musculoskeletal:      Cervical back: Neck supple.   Skin:     General: Skin is warm.      Findings: No rash.   Neurological:      Mental Status: She is alert.   Psychiatric:         Attention and Perception: Attention normal.         Mood and Affect: Mood is anxious.         Speech: Speech normal.         Behavior: Behavior is cooperative.         Thought Content: Thought content includes suicidal (Patient states \"I am always suicidal, that is not different\".) ideation.           ED Course, Procedures, & Data      Procedures                 Results for orders placed or performed during the hospital encounter of 06/26/23   HCG qualitative urine     Status: Normal   Result Value Ref Range    hCG Urine Qualitative Negative Negative   Drug abuse screen 1 urine (ED)     Status: Normal   Result Value Ref Range    Amphetamines Urine Screen Negative Screen Negative    Barbituates Urine Screen Negative Screen Negative    Benzodiazepine Urine Screen Negative Screen Negative    Cannabinoids Urine Screen Negative Screen Negative    Cocaine Urine Screen Negative Screen Negative    Opiates Urine Screen Negative Screen Negative   Urine Drugs of Abuse Screen     Status: Normal    Narrative    The following orders were created for panel order Urine Drugs of Abuse Screen.  Procedure                               Abnormality         Status                     ---------                               -----------         ------                     Drug " abuse screen 1 urin...[[id number]]  Normal              Final result                 Please view results for these tests on the individual orders.     Medications   OLANZapine zydis (zyPREXA) ODT tab 10 mg (10 mg Oral $Given 6/26/23 1630)   hydrOXYzine (ATARAX) tablet 25 mg (25 mg Oral $Given 6/26/23 1630)     Labs Ordered and Resulted from Time of ED Arrival to Time of ED Departure   HCG QUALITATIVE URINE - Normal       Result Value    hCG Urine Qualitative Negative     DRUG ABUSE SCREEN 1 URINE (ED) - Normal    Amphetamines Urine Screen Negative      Barbituates Urine Screen Negative      Benzodiazepine Urine Screen Negative      Cannabinoids Urine Screen Negative      Cocaine Urine Screen Negative      Opiates Urine Screen Negative       No orders to display          Critical care was not performed.     Medical Decision Making  The patient's presentation was of moderate complexity (a chronic illness mild to moderate exacerbation, progression, or side effect of treatment).    The patient's evaluation involved:  review of external note(s) from 3+ sources (Reviewed multiple ED visits from earlier this month)    The patient's management necessitated high risk (a decision regarding hospitalization).      Assessment & Plan    22-year-old patient who is well-known to the emergency department due to multiple presentations for psychiatric and physical complaints.  She has a care plan in place that she is to be returned to her group home soon as possible in all but very extreme circumstances.  She has significant 24-hour support there.  She is presenting due to anxiety and worry over a family member who she discovered recently is ill.  Here she presents as anxious but cooperative and pleasant.  Admits to suicidal ideation which she reports to me is chronic and unchanged from baseline.  She is not endorsing psychotic symptoms today.  She was given Zyprexa and hydroxyzine for anxiety.  Her symptoms improved.  Her mental  health symptoms are chronic, not currently actively suicidal, feeling some improvement in her anxiety after medications it is appropriate for discharge.    I have reviewed the nursing notes. I have reviewed the findings, diagnosis, plan and need for follow up with the patient.    Discharge Medication List as of 6/26/2023  5:02 PM          Final diagnoses:   Anxiety reaction       Leo Lowe MD  [facility] EMERGENCY DEPARTMENT  6/26/2023     Leo Lowe MD  06/26/23 [id number]

## 2023-06-27 ENCOUNTER — NURSE TRIAGE (OUTPATIENT)
Dept: NURSING | Facility: CLINIC | Age: 23
End: 2023-06-27
Payer: MEDICARE

## 2023-06-27 NOTE — TELEPHONE ENCOUNTER
Patient calling, she has blood on the toilet tissue after a bowel movement. She thinks she ripped her rectum because it's painful when she has a bowel movement. States that stools are very hard. She's been up all night due to frequent bowel movements. Stools are dark brown. Rates her abdominal pain at 4/10.    This has happened in the past.     Care advice given for patient to be seen in Urgent Care. Patient ended call before further care advice could be given. Unable to initiate [facility]-level triage due to patient not having an [facility] primary provider.     Lucina Quiros RN  [facility]  June 27, 2023, 5:04 PM    Reason for Disposition    Constant abdominal pain lasting > 2 hours    Additional Information    Negative: Passed out (i.e., fainted, collapsed and was not responding)    Negative: Shock suspected (e.g., cold/pale/clammy skin, too weak to stand, low BP, rapid pulse)    Negative: Vomiting red blood or black (coffee ground) material    Negative: Sounds like a life-threatening emergency to the triager    Negative: Diarrhea is main symptom    Negative: Rectal symptoms    Negative: SEVERE rectal bleeding (large blood clots; constant or on and off bleeding)    Negative: SEVERE dizziness (e.g., unable to stand, requires support to walk, feels like passing out now)    Negative: MODERATE rectal bleeding (small blood clots, passing blood without stool, or toilet water turns red) more than once a day    Negative: Bloody, black, or tarry bowel movements  (Exception: Chronic-unchanged black-grey bowel movements and is taking iron pills or Pepto-Bismol.)    Negative: High-risk adult (e.g., prior surgery on aorta, abdominal aortic aneurysm)    Negative: Rectal foreign body (inserted or swallowed)    Negative: SEVERE abdominal pain (e.g., excruciating)    Protocols used: RECTAL BLEEDING-A-OH

## 2023-06-29 ENCOUNTER — HOSPITAL ENCOUNTER (EMERGENCY)
Facility: CLINIC | Age: 23
Discharge: NURSING, REHAB OR LONG-TERM CARE | End: 2023-06-29
Attending: PSYCHIATRY & NEUROLOGY | Admitting: PSYCHIATRY & NEUROLOGY
Payer: MEDICARE

## 2023-06-29 VITALS
BODY MASS INDEX: 40.82 KG/M2 | OXYGEN SATURATION: 100 % | HEIGHT: 65 IN | RESPIRATION RATE: 16 BRPM | TEMPERATURE: 98.3 F | DIASTOLIC BLOOD PRESSURE: 85 MMHG | HEART RATE: 87 BPM | WEIGHT: 245 LBS | SYSTOLIC BLOOD PRESSURE: 128 MMHG

## 2023-06-29 DIAGNOSIS — F43.0 ACUTE REACTION TO STRESS: ICD-10-CM

## 2023-06-29 DIAGNOSIS — F79 INTELLECTUAL DISABILITY: ICD-10-CM

## 2023-06-29 PROCEDURE — 250N000013 HC RX MED GY IP 250 OP 250 PS 637: Performed by: PSYCHIATRY & NEUROLOGY

## 2023-06-29 PROCEDURE — 99283 EMERGENCY DEPT VISIT LOW MDM: CPT | Performed by: PSYCHIATRY & NEUROLOGY

## 2023-06-29 PROCEDURE — 99284 EMERGENCY DEPT VISIT MOD MDM: CPT | Performed by: PSYCHIATRY & NEUROLOGY

## 2023-06-29 RX ORDER — HYDROXYZINE HYDROCHLORIDE 50 MG/1
50 TABLET, FILM COATED ORAL ONCE
Status: COMPLETED | OUTPATIENT
Start: 2023-06-29 | End: 2023-06-29

## 2023-06-29 RX ADMIN — HYDROXYZINE HYDROCHLORIDE 50 MG: 50 TABLET, FILM COATED ORAL at 17:53

## 2023-06-29 ASSESSMENT — ACTIVITIES OF DAILY LIVING (ADL): ADLS_ACUITY_SCORE: 37

## 2023-06-29 NOTE — ED TRIAGE NOTES
"Patient presents due to increased anxiety, pacing, unable to sleep. Patient reports smoking a whole pack of cigarettes. Patient reports feeling sweaty and shaking. Patient states, \"I just don't feel right.\"     Triage Assessment     Row Name 06/29/23 4381       Triage Assessment (Adult)    Airway WDL WDL       Respiratory WDL    Respiratory WDL WDL       Skin Circulation/Temperature WDL    Skin Circulation/Temperature WDL WDL       Cardiac WDL    Cardiac WDL WDL       Peripheral/Neurovascular WDL    Peripheral Neurovascular WDL WDL       Cognitive/Neuro/Behavioral WDL    Cognitive/Neuro/Behavioral WDL WDL              "

## 2023-06-30 ENCOUNTER — HOSPITAL ENCOUNTER (EMERGENCY)
Facility: CLINIC | Age: 23
Discharge: HOME | End: 2023-06-30
Attending: PSYCHIATRY & NEUROLOGY | Admitting: PSYCHIATRY & NEUROLOGY
Payer: MEDICARE

## 2023-06-30 ENCOUNTER — HOSPITAL ENCOUNTER (EMERGENCY)
Facility: CLINIC | Age: 23
Discharge: NURSING, REHAB OR LONG-TERM CARE | End: 2023-06-30
Attending: PSYCHIATRY & NEUROLOGY | Admitting: PSYCHIATRY & NEUROLOGY
Payer: MEDICARE

## 2023-06-30 VITALS
DIASTOLIC BLOOD PRESSURE: 81 MMHG | TEMPERATURE: 98.3 F | SYSTOLIC BLOOD PRESSURE: 114 MMHG | OXYGEN SATURATION: 100 % | HEART RATE: 82 BPM | RESPIRATION RATE: 16 BRPM

## 2023-06-30 VITALS
WEIGHT: 246 LBS | OXYGEN SATURATION: 99 % | HEART RATE: 93 BPM | TEMPERATURE: 98.8 F | DIASTOLIC BLOOD PRESSURE: 82 MMHG | RESPIRATION RATE: 18 BRPM | SYSTOLIC BLOOD PRESSURE: 119 MMHG | BODY MASS INDEX: 40.94 KG/M2

## 2023-06-30 DIAGNOSIS — R45.851 SUICIDAL IDEATION: ICD-10-CM

## 2023-06-30 DIAGNOSIS — R42 DIZZINESS AND GIDDINESS: ICD-10-CM

## 2023-06-30 DIAGNOSIS — F43.0 ACUTE REACTION TO SITUATIONAL STRESS: ICD-10-CM

## 2023-06-30 DIAGNOSIS — F60.3 BORDERLINE PERSONALITY DISORDER (H): ICD-10-CM

## 2023-06-30 DIAGNOSIS — F79 INTELLECTUAL DISABILITY: ICD-10-CM

## 2023-06-30 PROCEDURE — 99285 EMERGENCY DEPT VISIT HI MDM: CPT

## 2023-06-30 PROCEDURE — 99284 EMERGENCY DEPT VISIT MOD MDM: CPT | Performed by: PSYCHIATRY & NEUROLOGY

## 2023-06-30 PROCEDURE — 99283 EMERGENCY DEPT VISIT LOW MDM: CPT

## 2023-06-30 PROCEDURE — 250N000013 HC RX MED GY IP 250 OP 250 PS 637: Performed by: PSYCHIATRY & NEUROLOGY

## 2023-06-30 RX ORDER — DIPHENHYDRAMINE HCL 25 MG
25 CAPSULE ORAL ONCE
Status: COMPLETED | OUTPATIENT
Start: 2023-06-30 | End: 2023-06-30

## 2023-06-30 RX ORDER — HALOPERIDOL 5 MG/1
10 TABLET ORAL ONCE
Status: COMPLETED | OUTPATIENT
Start: 2023-06-30 | End: 2023-06-30

## 2023-06-30 RX ADMIN — HALOPERIDOL 10 MG: 5 TABLET ORAL at 22:04

## 2023-06-30 RX ADMIN — DIPHENHYDRAMINE HYDROCHLORIDE 25 MG: 25 CAPSULE ORAL at 22:05

## 2023-06-30 NOTE — ED TRIAGE NOTES
"Patient states, \"I feel like I am going to pass out.\" Patient states she has felt like this for a couple days, delayed speech when talking with patient. States she was here yesterday for a lot of anxiety.     Patient states she also has a dry mouth, feels clammy/cold, is \"not having a good day.\" She states that she threw up before she came here. She states that she took a cab here.      Triage Assessment     Row Name 06/30/23 1151       Triage Assessment (Adult)    Airway WDL WDL       Respiratory WDL    Respiratory WDL WDL       Skin Circulation/Temperature WDL    Skin Circulation/Temperature WDL WDL       Cardiac WDL    Cardiac WDL WDL       Peripheral/Neurovascular WDL    Peripheral Neurovascular WDL WDL       Cognitive/Neuro/Behavioral WDL    Cognitive/Neuro/Behavioral WDL WDL              "

## 2023-06-30 NOTE — ED PROVIDER NOTES
"ED Provider Note  [facility]      History     Chief Complaint   Patient presents with     Anxiety     Patient presents due to increased anxiety, pacing, unable to sleep. Patient reports smoking a whole pack of cigarettes. Patient reports feeling sweaty and shaking. Patient states, \"I just don't feel right.\"     HPI  Sadaf Ross is a 22 year old female who is here via cab from her group home as she was feeling anxious and out of sorts. She did not feel this way when she saw her provider through [facility] this morning. She denies feeling suicidal.    Past Medical History  Past Medical History:   Diagnosis Date     ADHD (attention deficit hyperactivity disorder)      Bipolar 1 disorder (H)      Borderline personality disorder (H)      Depression      Depressive disorder      Intellectual disability      Obesity      Syncope      Past Surgical History:   Procedure Laterality Date     APPENDECTOMY       APPENDECTOMY       docusate sodium (COLACE) 50 MG capsule  acetaminophen (TYLENOL) 325 MG tablet  albuterol (PROAIR HFA/PROVENTIL HFA/VENTOLIN HFA) 108 (90 Base) MCG/ACT inhaler  alum & mag hydroxide-simethicone (MAALOX  ES) 400-400-40 MG/5ML SUSP suspension  ARIPiprazole lauroxil ER (ARISTADA) 882 MG/3.2ML intra-muscular  benzonatate (TESSALON) 200 MG capsule  benztropine (COGENTIN) 1 MG tablet  bisacodyl (DULCOLAX) 5 MG EC tablet  calcium carbonate (TUMS) 500 MG chewable tablet  clobetasol (TEMOVATE) 0.05 % CREA cream  cyclobenzaprine (FLEXERIL) 10 MG tablet  diclofenac (VOLTAREN) 1 % topical gel  fluticasone (FLONASE) 50 MCG/ACT nasal spray  guaiFENesin (MUCINEX) 600 MG 12 hr tablet  hydrocortisone (CORTAID) 0.5 % external cream  hydrocortisone, Perianal, (HYDROCORTISONE) 2.5 % cream  hydrOXYzine (ATARAX) 50 MG tablet  hyoscyamine (LEVSIN/SL) 0.125 MG sublingual tablet  ibuprofen (ADVIL/MOTRIN) 400 MG tablet  loperamide (IMODIUM) 2 MG capsule  loratadine (CLARITIN) 10 MG tablet  LORazepam " "(ATIVAN) 0.5 MG tablet  multivitamin, therapeutic (THERA-VIT) TABS tablet  mupirocin (BACTROBAN) 2 % external ointment  OLANZapine zydis (ZYPREXA) 5 MG ODT  omeprazole (PRILOSEC) 40 MG DR capsule  ondansetron (ZOFRAN) 4 MG tablet  ondansetron (ZOFRAN) 4 MG tablet  polyethylene glycol (MIRALAX) 17 GM/Dose powder  prochlorperazine (COMPAZINE) 10 MG tablet  promethazine (PHENERGAN) 12.5 MG tablet  sennosides (SENOKOT) 8.6 MG tablet  sulfamethoxazole-trimethoprim (BACTRIM DS) 800-160 MG tablet  traZODone (DESYREL) 50 MG tablet  venlafaxine (EFFEXOR-ER) 225 MG 24 hr tablet  Vitamin D, Cholecalciferol, 25 MCG (1000 UT) TABS      Allergies   Allergen Reactions     Penicillins Rash and Unknown     Family History  Family History   Problem Relation Age of Onset     Diabetes Type 1 Father      Cancer Paternal Grandfather      Social History   Social History     Tobacco Use     Smoking status: Every Day     Packs/day: 1.00     Years: 5.00     Pack years: 5.00     Types: Vaping Device, Cigarettes     Smokeless tobacco: Never   Substance Use Topics     Alcohol use: No     Drug use: No         A medically appropriate review of systems was performed with pertinent positives and negatives noted in the HPI, and all other systems negative.    Physical Exam   BP: (!) 144/81  Pulse: 95  Temp: 98.4  F (36.9  C)  Resp: 18  Height: 165.1 cm (5' 5\")  Weight: 111.1 kg (245 lb)  SpO2: 99 %  Physical Exam  Vitals and nursing note reviewed.   Constitutional:       General: She is not in acute distress.     Appearance: She is not ill-appearing or diaphoretic.   HENT:      Head: Normocephalic.   Eyes:      Pupils: Pupils are equal, round, and reactive to light.   Cardiovascular:      Rate and Rhythm: Normal rate.   Pulmonary:      Effort: Pulmonary effort is normal.   Musculoskeletal:         General: Normal range of motion.      Cervical back: Normal range of motion.   Skin:     General: Skin is warm.   Neurological:      General: No focal " deficit present.      Mental Status: She is alert.   Psychiatric:         Attention and Perception: Attention and perception normal. She does not perceive auditory or visual hallucinations.         Mood and Affect: Mood and affect normal.         Speech: Speech normal.         Behavior: Behavior normal. Behavior is not agitated, aggressive, hyperactive or combative. Behavior is cooperative.         Thought Content: Thought content normal. Thought content is not paranoid or delusional. Thought content does not include homicidal or suicidal ideation.         Cognition and Memory: Cognition and memory normal.         Judgment: Judgment normal.           ED Course, Procedures, & Data      Procedures         Mental Health Risk Assessment      PSS-3    Date and Time Over the past 2 weeks have you felt down, depressed, or hopeless? Over the past 2 weeks have you had thoughts of killing yourself? Have you ever attempted to kill yourself? When did this last happen? User   06/29/23 1715 yes yes yes more than 6 months ago TMW      C-SSRS ([place])    Date and Time Q1 Wished to be Dead (Past Month) Q2 Suicidal Thoughts (Past Month) Q3 Suicidal Thought Method Q4 Suicidal Intent without Specific Plan Q5 Suicide Intent with Specific Plan Q6 Suicide Behavior (Lifetime) Within the Past 3 Months? RETIRED: Level of Risk per Screen Screening Not Complete User   06/29/23 1715 yes yes no no no yes -- -- -- TMW                Item Assessment   Suicidal Ideation None   Plan None   Intent None   Suicidal or self-harm behaviors None acute or active   Risk Factors Agitation or severe anxiety   Protective Factors Identified reasons for living              No results found for any visits on 06/29/23.  Medications   hydrOXYzine (ATARAX) tablet 50 mg (50 mg Oral $Given 6/29/23 4107)     Labs Ordered and Resulted from Time of ED Arrival to Time of ED Departure - No data to display  No orders to display          Critical care was not performed.  "    Medical Decision Making  The patient's presentation was of moderate complexity (an undiagnosed new problem with uncertain diagnosis).    The patient's evaluation involved:  history and exam without other MDM data elements    The patient's management necessitated moderate risk (prescription drug management including medications given in the ED) and high risk (drug therapy requiring intensive monitoring (hydorxyzine dose given and monitored)).      Assessment & Plan    Patient is here as she reports feeling anxious. She is well-known to us due to her frequent ED visits, coming to here to get immediate care and attention. Patient was given a dose of hydroxyzine which she felt helped \"a little.\" She did not feel she needed a repeat dose or an alternative medication. She felt ready to return to her group home. She has arranged to get transportation. Patient appears nonacute. She can be discharged.    I have reviewed the nursing notes. I have reviewed the findings, diagnosis, plan and need for follow up with the patient.    New Prescriptions    No medications on file       Final diagnoses:   Acute reaction to stress   Intellectual disability       Magno Sena MD  [facility] EMERGENCY DEPARTMENT  6/29/2023     Magno Sena MD  06/29/23 1920    "

## 2023-06-30 NOTE — ED PROVIDER NOTES
"ED Provider Note  [facility]      History     Chief Complaint   Patient presents with     Dizziness     Patient states, \"I feel like I am going to pass out.\" Patient states she has felt like this for a couple days, delayed speech when talking with patient. States she was here yesterday for a lot of anxiety.      Suicidal     Patient states to be suicidal at her baseline, she is upset with one of the group home employees, she states that this employee makes her want to kill herself.     HPI  Sadaf Ross is a 22 year old female who is here citing concerns for feeling dizzy. She did not have this concern yesterday when she saw her primary care provider nor in the ED later that day. Patient ambulates without difficulty and denies feeling light-headed or dizzy when she rises from a sitting position. She does not appear in physical distress. There is no nystagmus.    Patient denies any acute SI.    Past Medical History  Past Medical History:   Diagnosis Date     ADHD (attention deficit hyperactivity disorder)      Bipolar 1 disorder (H)      Borderline personality disorder (H)      Depression      Depressive disorder      Intellectual disability      Obesity      Syncope      Past Surgical History:   Procedure Laterality Date     APPENDECTOMY       APPENDECTOMY       acetaminophen (TYLENOL) 325 MG tablet  albuterol (PROAIR HFA/PROVENTIL HFA/VENTOLIN HFA) 108 (90 Base) MCG/ACT inhaler  alum & mag hydroxide-simethicone (MAALOX  ES) 400-400-40 MG/5ML SUSP suspension  ARIPiprazole lauroxil ER (ARISTADA) 882 MG/3.2ML intra-muscular  benzonatate (TESSALON) 200 MG capsule  benztropine (COGENTIN) 1 MG tablet  bisacodyl (DULCOLAX) 5 MG EC tablet  calcium carbonate (TUMS) 500 MG chewable tablet  clobetasol (TEMOVATE) 0.05 % CREA cream  cyclobenzaprine (FLEXERIL) 10 MG tablet  diclofenac (VOLTAREN) 1 % topical gel  docusate sodium (COLACE) 50 MG capsule  fluticasone (FLONASE) 50 MCG/ACT nasal " spray  guaiFENesin (MUCINEX) 600 MG 12 hr tablet  hydrocortisone (CORTAID) 0.5 % external cream  hydrocortisone, Perianal, (HYDROCORTISONE) 2.5 % cream  hydrOXYzine (ATARAX) 50 MG tablet  hyoscyamine (LEVSIN/SL) 0.125 MG sublingual tablet  ibuprofen (ADVIL/MOTRIN) 400 MG tablet  loperamide (IMODIUM) 2 MG capsule  loratadine (CLARITIN) 10 MG tablet  LORazepam (ATIVAN) 0.5 MG tablet  multivitamin, therapeutic (THERA-VIT) TABS tablet  mupirocin (BACTROBAN) 2 % external ointment  OLANZapine zydis (ZYPREXA) 5 MG ODT  omeprazole (PRILOSEC) 40 MG DR capsule  ondansetron (ZOFRAN) 4 MG tablet  ondansetron (ZOFRAN) 4 MG tablet  polyethylene glycol (MIRALAX) 17 GM/Dose powder  prochlorperazine (COMPAZINE) 10 MG tablet  promethazine (PHENERGAN) 12.5 MG tablet  sennosides (SENOKOT) 8.6 MG tablet  sulfamethoxazole-trimethoprim (BACTRIM DS) 800-160 MG tablet  traZODone (DESYREL) 50 MG tablet  venlafaxine (EFFEXOR-ER) 225 MG 24 hr tablet  Vitamin D, Cholecalciferol, 25 MCG (1000 UT) TABS      Allergies   Allergen Reactions     Penicillins Rash and Unknown     Family History  Family History   Problem Relation Age of Onset     Diabetes Type 1 Father      Cancer Paternal Grandfather      Social History   Social History     Tobacco Use     Smoking status: Every Day     Packs/day: 1.00     Years: 5.00     Pack years: 5.00     Types: Vaping Device, Cigarettes     Smokeless tobacco: Never   Substance Use Topics     Alcohol use: No     Drug use: No         A medically appropriate review of systems was performed with pertinent positives and negatives noted in the HPI, and all other systems negative.    Physical Exam   BP: 114/81  Pulse: 82  Temp: 98.3  F (36.8  C)  Resp: 16  SpO2: 100 %  Physical Exam  Vitals and nursing note reviewed.   HENT:      Head: Normocephalic.   Eyes:      Pupils: Pupils are equal, round, and reactive to light.   Pulmonary:      Effort: Pulmonary effort is normal.   Musculoskeletal:         General: Normal range of  motion.      Cervical back: Normal range of motion.   Neurological:      General: No focal deficit present.      Mental Status: She is alert.   Psychiatric:         Attention and Perception: Attention and perception normal.         Mood and Affect: Mood and affect normal.         Speech: Speech normal.         Behavior: Behavior normal. Behavior is not agitated, aggressive, hyperactive or combative. Behavior is cooperative.         Thought Content: Thought content normal. Thought content is not paranoid or delusional. Thought content does not include homicidal or suicidal ideation.         Cognition and Memory: Cognition normal.         Judgment: Judgment normal.         ED Course, Procedures, & Data      Procedures       Mental Health Risk Assessment      PSS-3    Date and Time Over the past 2 weeks have you felt down, depressed, or hopeless? Over the past 2 weeks have you had thoughts of killing yourself? Have you ever attempted to kill yourself? When did this last happen? User   06/30/23 1152 yes yes yes more than 6 months ago NINO      C-SSRS ([place])    Date and Time Q1 Wished to be Dead (Past Month) Q2 Suicidal Thoughts (Past Month) Q3 Suicidal Thought Method Q4 Suicidal Intent without Specific Plan Q5 Suicide Intent with Specific Plan Q6 Suicide Behavior (Lifetime) Within the Past 3 Months? RETIRED: Level of Risk per Screen Screening Not Complete User   06/30/23 1152 yes yes no no no yes -- -- -- AAP                Item Assessment   Suicidal Ideation None   Plan None   Intent None   Suicidal or self-harm behaviors none active   Risk Factors Chronic passive thoughts   Protective Factors Responsibility to family or others; living with family        No results found for any visits on 06/30/23.  Medications - No data to display  Labs Ordered and Resulted from Time of ED Arrival to Time of ED Departure - No data to display  No orders to display          Critical care was not performed.     Medical Decision  Making  The patient's presentation was of low complexity (2+ clearly self-limited or minor problems).    The patient's evaluation involved:  history and exam without other MDM data elements    The patient's management necessitated moderate risk (prescription drug management including medications given in the ED). Patient was offered IV fluids which she declined.      Assessment & Plan    Patient is here coming from her group home as she is concerned about feeling dizzy. She does not feel it has impaired her ability to walk or function. Her exam appears rather unremarkable. I offered to provide her with IV fluids to see if she feels better, but she declined this offer.    Patient felt she can return to her group home, and can follow-up with her outpatient providers if she feels her concern gets worse. She plans on arranging for her ride back to her group home. Patient can be discharged.    I have reviewed the nursing notes. I have reviewed the findings, diagnosis, plan and need for follow up with the patient.    New Prescriptions    No medications on file       Final diagnoses:   Acute reaction to situational stress       Magno Sena MD  [facility] EMERGENCY DEPARTMENT  6/30/2023     Magno Sena MD  06/30/23 1713

## 2023-07-01 ENCOUNTER — HOSPITAL ENCOUNTER (EMERGENCY)
Facility: CLINIC | Age: 23
Discharge: HOME | End: 2023-07-01
Attending: EMERGENCY MEDICINE | Admitting: EMERGENCY MEDICINE
Payer: MEDICARE

## 2023-07-01 VITALS
TEMPERATURE: 97 F | RESPIRATION RATE: 12 BRPM | OXYGEN SATURATION: 96 % | SYSTOLIC BLOOD PRESSURE: 106 MMHG | DIASTOLIC BLOOD PRESSURE: 65 MMHG | HEART RATE: 100 BPM

## 2023-07-01 DIAGNOSIS — F41.9 ANXIETY: ICD-10-CM

## 2023-07-01 PROCEDURE — 99283 EMERGENCY DEPT VISIT LOW MDM: CPT | Performed by: EMERGENCY MEDICINE

## 2023-07-01 NOTE — ED TRIAGE NOTES
Pt stated suicidal with plan to hit head on wall until she dies.  Pt reported that has refused all medications today because they don't help her.  Pt stated that was having disruptive behavior last night at group home because she was feeling suicidal.     Triage Assessment     Row Name 06/30/23 2128       Triage Assessment (Adult)    Airway WDL WDL       Respiratory WDL    Respiratory WDL WDL       Skin Circulation/Temperature WDL    Skin Circulation/Temperature WDL WDL       Cardiac WDL    Cardiac WDL WDL       Peripheral/Neurovascular WDL    Peripheral Neurovascular WDL WDL       Cognitive/Neuro/Behavioral WDL    Cognitive/Neuro/Behavioral WDL WDL

## 2023-07-01 NOTE — ED NOTES
Arrived via ambulance  [facility] [id number]  Pt from [facility]

## 2023-07-01 NOTE — ED NOTES
Pt given discharge paperwork and instructions. Per MD, okay to send pt by cab. Cab called, pt aware and agreeable to cab. Pt has no questions or concerns at this time

## 2023-07-01 NOTE — ED PROVIDER NOTES
"ED Provider Note  [facility]      History     Chief Complaint   Patient presents with     Suicidal     HPI  Sadaf Ross is a 22 year old female who is here via EMS as she was acting out at her group home. She was emotionally dysregulated and ended up getting brought in. This is her 17th ED visit to [facility] since 6/9/23. She was last seen this morning for \"dizzyness\" and passive SI. Patient reports feeling suicidal tonight. Her demeanor is incongruent with her concern here. She seems to enjoy engaging with the 1:1 sitter. She was offered oral versus IM medications for her suicidal threats and she opted for oral. She agreed to return home once she receives her medications. Group Home staff is aware of her coming here. They are comfortable with her return.    Past Medical History  Past Medical History:   Diagnosis Date     ADHD (attention deficit hyperactivity disorder)      Bipolar 1 disorder (H)      Borderline personality disorder (H)      Depression      Depressive disorder      Intellectual disability      Obesity      Syncope      Past Surgical History:   Procedure Laterality Date     APPENDECTOMY       APPENDECTOMY       acetaminophen (TYLENOL) 325 MG tablet  albuterol (PROAIR HFA/PROVENTIL HFA/VENTOLIN HFA) 108 (90 Base) MCG/ACT inhaler  ARIPiprazole lauroxil ER (ARISTADA) 882 MG/3.2ML intra-muscular  calcium carbonate (TUMS) 500 MG chewable tablet  hydrOXYzine (ATARAX) 50 MG tablet  hyoscyamine (LEVSIN/SL) 0.125 MG sublingual tablet  ibuprofen (ADVIL/MOTRIN) 400 MG tablet  loperamide (IMODIUM) 2 MG capsule  loratadine (CLARITIN) 10 MG tablet  LORazepam (ATIVAN) 0.5 MG tablet  multivitamin, therapeutic (THERA-VIT) TABS tablet  OLANZapine zydis (ZYPREXA) 5 MG ODT  omeprazole (PRILOSEC) 40 MG DR capsule  prochlorperazine (COMPAZINE) 10 MG tablet  promethazine (PHENERGAN) 12.5 MG tablet  sennosides (SENOKOT) 8.6 MG tablet  traZODone (DESYREL) 50 MG tablet  Vitamin D, " Cholecalciferol, 25 MCG (1000 UT) TABS  alum & mag hydroxide-simethicone (MAALOX  ES) 400-400-40 MG/5ML SUSP suspension  benzonatate (TESSALON) 200 MG capsule  benztropine (COGENTIN) 1 MG tablet  bisacodyl (DULCOLAX) 5 MG EC tablet  clobetasol (TEMOVATE) 0.05 % CREA cream  cyclobenzaprine (FLEXERIL) 10 MG tablet  diclofenac (VOLTAREN) 1 % topical gel  docusate sodium (COLACE) 50 MG capsule  fluticasone (FLONASE) 50 MCG/ACT nasal spray  guaiFENesin (MUCINEX) 600 MG 12 hr tablet  hydrocortisone (CORTAID) 0.5 % external cream  hydrocortisone, Perianal, (HYDROCORTISONE) 2.5 % cream  mupirocin (BACTROBAN) 2 % external ointment  ondansetron (ZOFRAN) 4 MG tablet  ondansetron (ZOFRAN) 4 MG tablet  polyethylene glycol (MIRALAX) 17 GM/Dose powder  sulfamethoxazole-trimethoprim (BACTRIM DS) 800-160 MG tablet  venlafaxine (EFFEXOR-ER) 225 MG 24 hr tablet      Allergies   Allergen Reactions     Penicillins Rash and Unknown     Family History  Family History   Problem Relation Age of Onset     Diabetes Type 1 Father      Cancer Paternal Grandfather      Social History   Social History     Tobacco Use     Smoking status: Every Day     Packs/day: 1.00     Years: 5.00     Pack years: 5.00     Types: Cigarettes     Smokeless tobacco: Never   Substance Use Topics     Alcohol use: No     Drug use: No         A medically appropriate review of systems was performed with pertinent positives and negatives noted in the HPI, and all other systems negative.    Physical Exam   BP: 119/82  Pulse: 93  Temp: 98.8  F (37.1  C)  Resp: 18  Weight: 111.6 kg (246 lb)  SpO2: 99 %  Physical Exam  Vitals and nursing note reviewed.   HENT:      Head: Normocephalic.   Eyes:      Pupils: Pupils are equal, round, and reactive to light.   Pulmonary:      Effort: Pulmonary effort is normal.   Musculoskeletal:         General: Normal range of motion.      Cervical back: Normal range of motion.   Neurological:      General: No focal deficit present.      Mental  Status: She is alert.   Psychiatric:         Attention and Perception: Attention and perception normal. She does not perceive auditory or visual hallucinations.         Mood and Affect: Mood and affect normal.         Speech: Speech normal.         Behavior: Behavior normal. Behavior is not agitated, aggressive, hyperactive or combative. Behavior is cooperative.         Thought Content: Thought content normal. Thought content is not paranoid or delusional. Thought content does not include homicidal or suicidal ideation.         Cognition and Memory: Cognition and memory normal.         Judgment: Judgment normal.           ED Course, Procedures, & Data      Procedures       Mental Health Risk Assessment      PSS-3    Date and Time Over the past 2 weeks have you felt down, depressed, or hopeless? Over the past 2 weeks have you had thoughts of killing yourself? Have you ever attempted to kill yourself? When did this last happen? User   06/30/23 2133 yes yes yes within the last 24 hours (including today) CCP      C-SSRS ([place])    Date and Time Q1 Wished to be Dead (Past Month) Q2 Suicidal Thoughts (Past Month) Q3 Suicidal Thought Method Q4 Suicidal Intent without Specific Plan Q5 Suicide Intent with Specific Plan Q6 Suicide Behavior (Lifetime) Within the Past 3 Months? RETIRED: Level of Risk per Screen Screening Not Complete User   06/30/23 2133 yes yes yes no yes yes -- -- -- CCP                Item Assessment   Suicidal Ideation Suicidal thoughts   Plan None   Intent None   Suicidal or self-harm behaviors gets behaviorally dysregulated   Risk Factors Agitation or severe anxiety   Protective Factors Supportive social network of family and/or friends              No results found for any visits on 06/30/23.  Medications   haloperidol (HALDOL) tablet 10 mg (10 mg Oral $Given 6/30/23 2204)   diphenhydrAMINE (BENADRYL) capsule 25 mg (25 mg Oral $Given 6/30/23 2205)     Labs Ordered and Resulted from Time of ED Arrival  to Time of ED Departure - No data to display  No orders to display          Critical care was not performed.     Medical Decision Making  The patient's presentation was of moderate complexity (a chronic illness mild to moderate exacerbation, progression, or side effect of treatment).    The patient's evaluation involved:  an assessment requiring an independent historian (see separate area of note for details)  review of external note(s) from 3+ sources (see separate area of note for details)    The patient's management necessitated high risk (a decision regarding hospitalization). Patient also was given a dose of haldol and benadryl to address her mood concerns.      Assessment & Plan    Patient is here via EMS from her group home where she got emotionally and behaviorally dysregulated and felt compelled to be seen in the ED. She appears at baseline here. She is in emotional and behavioral control. Patient agreed to take a dose of haldol and benadryl and was comfortable with going home. Her group home staff is aware of her behavior concerns and is comfortable with her return. She can be discharged.    I have reviewed the nursing notes. I have reviewed the findings, diagnosis, plan and need for follow up with the patient.    New Prescriptions    No medications on file       Final diagnoses:   Intellectual disability   Borderline personality disorder (H)   Acute reaction to situational stress       Magno Sena MD  [facility] EMERGENCY DEPARTMENT  6/30/2023     Magno Sena MD  06/30/23 0808

## 2023-07-01 NOTE — ED NOTES
Bed: UREDH-A  Expected date:   Expected time:   Means of arrival:   Comments:  N711 22F SI w/ plan. 15 mins

## 2023-07-02 NOTE — ED TRIAGE NOTES
Triage Assessment     Row Name 07/01/23 4323       Triage Assessment (Adult)    Airway WDL WDL       Respiratory WDL    Respiratory WDL WDL       Skin Circulation/Temperature WDL    Skin Circulation/Temperature WDL WDL       Cardiac WDL    Cardiac WDL WDL       Peripheral/Neurovascular WDL    Peripheral Neurovascular WDL WDL       Cognitive/Neuro/Behavioral WDL    Cognitive/Neuro/Behavioral WDL WDL

## 2023-07-02 NOTE — ED TRIAGE NOTES
Patient was BIBA. She c/o of uncontrolled anxiety. She  reported that sometimes her hands shake. Patient was given Ativan 0.5 mg and Hydroxyzine 50 mg by  staff right before patient left for ED.

## 2023-07-02 NOTE — DISCHARGE INSTRUCTIONS
Back to group home tonight    Take your medications as directed    Please make an appointment to follow up with Your care providers In the next week for recheck

## 2023-07-02 NOTE — ED NOTES
Pt requesting more medication for anxiety. Pt notes no meds order yet. Will notify MD when pt has one. Pt notified and would like to be discharged.

## 2023-07-02 NOTE — ED PROVIDER NOTES
ED Provider Note  [facility]      History     Chief Complaint   Patient presents with     Anxiety     Pt c/o having an anxiety attack at home. Pt took hydroxyzine and ativan. C/o feeling shaky.     HPI  Sadaf Ross is a 22 year old female PMH of bipolar disorder, depression, BPD who presents to the ER by ambulance for evaluation of anxiety.  Patient states that she took both of her hydroxyzine and Ativan prior to arrival approximately 2 hours ago but states that her anxiety continued so that she was transported here for further evaluation.  Patient denies any chest pain any coughing vomiting diarrhea melena or bright blood per rectum and states that currently her anxiety is mostly resolved.  Patient states she would like to go back to her group home at this point and does not want any further medications.    Past Medical History:   Diagnosis Date     ADHD (attention deficit hyperactivity disorder)      Bipolar 1 disorder (H)      Borderline personality disorder (H)      Depression      Depressive disorder      Intellectual disability      Obesity      Syncope      Current Outpatient Medications   Medication Sig Dispense Refill     hydrOXYzine (ATARAX) 50 MG tablet Take 50 mg by mouth 2 times daily as needed for anxiety       LORazepam (ATIVAN) 0.5 MG tablet Take 0.5 mg by mouth once as needed for anxiety Give as needed any time she has the urge to call 911 or to visit the ER       acetaminophen (TYLENOL) 325 MG tablet Take 650 mg by mouth every 6 hours as needed for mild pain       albuterol (PROAIR HFA/PROVENTIL HFA/VENTOLIN HFA) 108 (90 Base) MCG/ACT inhaler Inhale 2 puffs into the lungs every 6 hours as needed for shortness of breath, wheezing or cough 18 g 0     alum & mag hydroxide-simethicone (MAALOX  ES) 400-400-40 MG/5ML SUSP suspension Take 10 mLs by mouth every 6 hours as needed for indigestion       ARIPiprazole lauroxil ER (ARISTADA) 882 MG/3.2ML intra-muscular Inject  882 mg into the muscle every 28 days On the 22nd of each month       benzonatate (TESSALON) 200 MG capsule Take 1 capsule (200 mg) by mouth 3 times daily as needed for cough 15 capsule 0     benztropine (COGENTIN) 1 MG tablet Take 1 mg by mouth every evening       bisacodyl (DULCOLAX) 5 MG EC tablet Take 1 tablet (5 mg) by mouth daily as needed for constipation 30 tablet 0     calcium carbonate (TUMS) 500 MG chewable tablet Take 1 chew tab by mouth 2 times daily as needed for heartburn        clobetasol (TEMOVATE) 0.05 % CREA cream Apply 1 Application topically 2 times daily For up to 14 days       cyclobenzaprine (FLEXERIL) 10 MG tablet Take 10 mg by mouth 3 times daily as needed for muscle spasms       diclofenac (VOLTAREN) 1 % topical gel Apply 2 g topically daily as needed for moderate pain       docusate sodium (COLACE) 50 MG capsule Take 100 mg by mouth 2 times daily       fluticasone (FLONASE) 50 MCG/ACT nasal spray Spray 2 sprays into both nostrils daily       guaiFENesin (MUCINEX) 600 MG 12 hr tablet Take 2 tablets (1,200 mg) by mouth 2 times daily 20 tablet 0     hydrocortisone (CORTAID) 0.5 % external cream Apply topically 3 times daily as needed for rash       hydrocortisone, Perianal, (HYDROCORTISONE) 2.5 % cream Place rectally 2 times daily as needed for hemorrhoids 30 g 0     hyoscyamine (LEVSIN/SL) 0.125 MG sublingual tablet Place 0.125 mg under the tongue every 4 hours as needed (nausea)       ibuprofen (ADVIL/MOTRIN) 400 MG tablet Take 400 mg by mouth 3 times daily as needed for moderate pain       loperamide (IMODIUM) 2 MG capsule Take 2 mg by mouth 4 times daily as needed for diarrhea       loratadine (CLARITIN) 10 MG tablet Take 1 tablet by mouth daily       multivitamin, therapeutic (THERA-VIT) TABS tablet Take 1 tablet by mouth daily       mupirocin (BACTROBAN) 2 % external ointment Apply topically 3 times daily 15 g 0     OLANZapine zydis (ZYPREXA) 5 MG ODT Take 1 tablet (5 mg) by mouth At  Bedtime 30 tablet 0     omeprazole (PRILOSEC) 40 MG DR capsule Take 40 mg by mouth daily before breakfast       ondansetron (ZOFRAN) 4 MG tablet Take 1 tablet (4 mg) by mouth every 8 hours as needed 15 tablet 0     ondansetron (ZOFRAN) 4 MG tablet Take 4 mg by mouth every 8 hours as needed for nausea       polyethylene glycol (MIRALAX) 17 GM/Dose powder Take 17 g by mouth daily       prochlorperazine (COMPAZINE) 10 MG tablet Take 10 mg by mouth every 6 hours as needed for nausea or vomiting       promethazine (PHENERGAN) 12.5 MG tablet Take 12.5 mg by mouth every 4 hours       sennosides (SENOKOT) 8.6 MG tablet Take 1 tablet by mouth 2 times daily as needed for constipation       sulfamethoxazole-trimethoprim (BACTRIM DS) 800-160 MG tablet Take 1 tablet by mouth 2 times daily 10 tablet 0     traZODone (DESYREL) 50 MG tablet Take 100 mg by mouth At Bedtime       venlafaxine (EFFEXOR-ER) 225 MG 24 hr tablet Take 225 mg by mouth daily       Vitamin D, Cholecalciferol, 25 MCG (1000 UT) TABS Take 1,000 Units by mouth daily        Allergies   Allergen Reactions     Penicillins Rash and Unknown     Social History     Socioeconomic History     Marital status: Single     Spouse name: Not on file     Number of children: Not on file     Years of education: Not on file     Highest education level: Not on file   Occupational History     Not on file   Tobacco Use     Smoking status: Every Day     Packs/day: 1.00     Years: 5.00     Pack years: 5.00     Types: Cigarettes     Smokeless tobacco: Never   Substance and Sexual Activity     Alcohol use: No     Drug use: No     Sexual activity: Not Currently     Partners: Female, Male     Birth control/protection: Pill   Other Topics Concern     Not on file   Social History Narrative     Not on file     Social Determinants of Health     Financial Resource Strain: Not on file   Food Insecurity: Not on file   Transportation Needs: Not on file   Physical Activity: Not on file   Stress: Not  on file   Social Connections: Not on file   Intimate Partner Violence: Not on file   Housing Stability: Not on file     Past Surgical History:   Procedure Laterality Date     APPENDECTOMY       APPENDECTOMY       Family History   Problem Relation Age of Onset     Diabetes Type 1 Father      Cancer Paternal Grandfather             A medically appropriate review of systems was performed with pertinent positives and negatives noted in the HPI, and all other systems negative.    Physical Exam   BP: 106/65  Pulse: 100  Temp: 97  F (36.1  C)  Resp: 12  SpO2: 96 %  Physical Exam  Vitals and nursing note reviewed.   Constitutional:       General: She is not in acute distress.     Appearance: She is not ill-appearing, toxic-appearing or diaphoretic.   HENT:      Head: Atraumatic.   Eyes:      Extraocular Movements: Extraocular movements intact.      Pupils: Pupils are equal, round, and reactive to light.   Cardiovascular:      Rate and Rhythm: Regular rhythm.   Pulmonary:      Effort: No respiratory distress.   Musculoskeletal:         General: No deformity.   Neurological:      General: No focal deficit present.      Mental Status: She is alert and oriented to person, place, and time.   Psychiatric:         Mood and Affect: Mood normal.           ED Course, Procedures, & Data      Procedures            Critical care was not performed.     Medical Decision Making  The patient's presentation was of moderate complexity (a chronic illness mild to moderate exacerbation, progression, or side effect of treatment).    The patient's evaluation involved:  history and exam without other MDM data elements    The patient's management necessitated only low risk treatment.      Assessment & Plan      I have reviewed the nursing notes.    With patient requesting no additional treatment here because she feels back to her baseline and wants to go back to the group home, the patient will be discharged from the ER to go back to her group  home.    I have reviewed the findings, diagnosis, plan and need for follow up with the patient.        Final diagnoses:   Anxiety     Back to group home tonight    Take your medications as directed    Please make an appointment to follow up with Your care providers In the next week for recheck      Ayo Romero MD, MD  [facility] EMERGENCY DEPARTMENT  7/1/2023     Ayo Romero MD  07/01/23 3217

## 2023-07-03 ENCOUNTER — HOSPITAL ENCOUNTER (EMERGENCY)
Facility: CLINIC | Age: 23
Discharge: HOME | End: 2023-07-03
Attending: PSYCHIATRY & NEUROLOGY | Admitting: PSYCHIATRY & NEUROLOGY
Payer: MEDICARE

## 2023-07-03 ENCOUNTER — NURSE TRIAGE (OUTPATIENT)
Dept: NURSING | Facility: CLINIC | Age: 23
End: 2023-07-03
Payer: MEDICARE

## 2023-07-03 ENCOUNTER — HOSPITAL ENCOUNTER (EMERGENCY)
Facility: CLINIC | Age: 23
End: 2023-07-03
Payer: MEDICARE

## 2023-07-03 DIAGNOSIS — F34.81 SEVERE MOOD DYSREGULATION DISORDER (H): ICD-10-CM

## 2023-07-03 DIAGNOSIS — F79 INTELLECTUAL DISABILITY: ICD-10-CM

## 2023-07-03 PROCEDURE — 99285 EMERGENCY DEPT VISIT HI MDM: CPT | Performed by: PSYCHIATRY & NEUROLOGY

## 2023-07-03 PROCEDURE — 96372 THER/PROPH/DIAG INJ SC/IM: CPT | Performed by: PSYCHIATRY & NEUROLOGY

## 2023-07-03 PROCEDURE — 250N000011 HC RX IP 250 OP 636: Mod: JZ | Performed by: PSYCHIATRY & NEUROLOGY

## 2023-07-03 RX ORDER — DIPHENHYDRAMINE HYDROCHLORIDE 50 MG/ML
25 INJECTION INTRAMUSCULAR; INTRAVENOUS ONCE
Status: COMPLETED | OUTPATIENT
Start: 2023-07-03 | End: 2023-07-03

## 2023-07-03 RX ORDER — HALOPERIDOL 5 MG/ML
5 INJECTION INTRAMUSCULAR ONCE
Status: COMPLETED | OUTPATIENT
Start: 2023-07-03 | End: 2023-07-03

## 2023-07-03 RX ADMIN — HALOPERIDOL LACTATE 5 MG: 5 INJECTION, SOLUTION INTRAMUSCULAR at 21:16

## 2023-07-03 RX ADMIN — DIPHENHYDRAMINE HYDROCHLORIDE 25 MG: 50 INJECTION, SOLUTION INTRAMUSCULAR; INTRAVENOUS at 21:16

## 2023-07-03 ASSESSMENT — ACTIVITIES OF DAILY LIVING (ADL): ADLS_ACUITY_SCORE: 37

## 2023-07-03 NOTE — TELEPHONE ENCOUNTER
Nurse Triage SBAR    Is this a 2nd Level Triage? NO    Situation:   Patient with complaints of suicidal thoughts and plan    Background:   Long standing history of chronic mental health concerns. Multiple recent ER visits for same.     Assessment:   Complains of suicidal ideation with plan    Protocol Recommended Disposition:   Call  Now    Recommendation:   Call 911  Patient arrived to ED during triaged and entered ED [facility].         Does the patient meet one of the following criteria for ADS visit consideration? 16+ years old, with an [facility] PCP     TIP  Providers, please consider if this condition is appropriate for management at one of our Acute and Diagnostic Services sites.     If patient is a good candidate, please use dotphrase <dot>triageresponse and select Refer to ADS to document.    Reason for Disposition    Patient is threatening suicide now    Additional Information    Negative: Patient attempted suicide    Protocols used: SUICIDE [id number]

## 2023-07-03 NOTE — ED NOTES
"Pt was in lobby while I was triaging a patient.  I kept patient in eye sight knowing her chief complaint.  The triage patient was walked back and when I returned to call Sadaf to the ER triage, she was missing.  I immediately called her on her phone and she stated \"I left and took a taxi home\".  She said she was almost home.  RN called 911 to inform them of patient elopement as well as called group home of incident.  Writer just confirmed that patient is back home and cooking dinner with group home staff.  Pt was informed she can return to ER at anytime but needs to wait to talk to ER staff before leaving [facility].  Patient agrees  "

## 2023-07-04 ENCOUNTER — NURSE TRIAGE (OUTPATIENT)
Dept: NURSING | Facility: CLINIC | Age: 23
End: 2023-07-04
Payer: MEDICARE

## 2023-07-04 ENCOUNTER — HOSPITAL ENCOUNTER (EMERGENCY)
Facility: CLINIC | Age: 23
Discharge: HOME | End: 2023-07-04
Attending: PSYCHIATRY & NEUROLOGY | Admitting: PSYCHIATRY & NEUROLOGY
Payer: MEDICARE

## 2023-07-04 VITALS
HEART RATE: 90 BPM | RESPIRATION RATE: 18 BRPM | SYSTOLIC BLOOD PRESSURE: 112 MMHG | OXYGEN SATURATION: 97 % | DIASTOLIC BLOOD PRESSURE: 76 MMHG | TEMPERATURE: 98.3 F

## 2023-07-04 DIAGNOSIS — F34.81 SEVERE MOOD DYSREGULATION DISORDER (H): ICD-10-CM

## 2023-07-04 DIAGNOSIS — F79 INTELLECTUAL DISABILITY: ICD-10-CM

## 2023-07-04 PROCEDURE — 99285 EMERGENCY DEPT VISIT HI MDM: CPT | Performed by: PSYCHIATRY & NEUROLOGY

## 2023-07-04 PROCEDURE — 250N000011 HC RX IP 250 OP 636: Mod: JZ | Performed by: PSYCHIATRY & NEUROLOGY

## 2023-07-04 PROCEDURE — 96372 THER/PROPH/DIAG INJ SC/IM: CPT | Performed by: PSYCHIATRY & NEUROLOGY

## 2023-07-04 PROCEDURE — 99285 EMERGENCY DEPT VISIT HI MDM: CPT

## 2023-07-04 RX ORDER — DIPHENHYDRAMINE HYDROCHLORIDE 50 MG/ML
50 INJECTION INTRAMUSCULAR; INTRAVENOUS ONCE
Status: DISCONTINUED | OUTPATIENT
Start: 2023-07-04 | End: 2023-07-04 | Stop reason: HOSPADM

## 2023-07-04 RX ORDER — DIPHENHYDRAMINE HYDROCHLORIDE 50 MG/ML
50 INJECTION INTRAMUSCULAR; INTRAVENOUS ONCE
Status: COMPLETED | OUTPATIENT
Start: 2023-07-04 | End: 2023-07-04

## 2023-07-04 RX ORDER — HALOPERIDOL 5 MG/ML
10 INJECTION INTRAMUSCULAR ONCE
Status: COMPLETED | OUTPATIENT
Start: 2023-07-04 | End: 2023-07-04

## 2023-07-04 RX ORDER — HALOPERIDOL 5 MG/ML
10 INJECTION INTRAMUSCULAR ONCE
Status: DISCONTINUED | OUTPATIENT
Start: 2023-07-04 | End: 2023-07-04 | Stop reason: HOSPADM

## 2023-07-04 RX ADMIN — HALOPERIDOL LACTATE 10 MG: 5 INJECTION, SOLUTION INTRAMUSCULAR at 18:09

## 2023-07-04 RX ADMIN — DIPHENHYDRAMINE HYDROCHLORIDE 50 MG: 50 INJECTION, SOLUTION INTRAMUSCULAR; INTRAVENOUS at 18:09

## 2023-07-04 NOTE — ED TRIAGE NOTES
Triage Assessment     Row Name 07/03/23 2031       Triage Assessment (Adult)    Airway WDL WDL       Respiratory WDL    Respiratory WDL WDL       Skin Circulation/Temperature WDL    Skin Circulation/Temperature WDL WDL       Cardiac WDL    Cardiac WDL WDL       Peripheral/Neurovascular WDL    Peripheral Neurovascular WDL WDL       Cognitive/Neuro/Behavioral WDL    Cognitive/Neuro/Behavioral WDL WDL

## 2023-07-04 NOTE — TELEPHONE ENCOUNTER
I want to kill myself.  I'm shaking right now.    I asked the patient if she'd like me call 911 for her.  She does.  Her plan is to cut herself. She said she has done this before.  She said she has medications but she's refused to take them.    I called 911 and spoke with a dispatcher. I gave the patient's name and address to him. He said he's sending someone out.    Shannen CISNEROS RN [facility]                   Reason for Disposition    Patient is threatening suicide now    Additional Information    Negative: Patient attempted suicide    Protocols used: SUICIDE [id number]

## 2023-07-04 NOTE — ED NOTES
Pt became irritated once they spoke to the Dr, started making threats towards the doctor and shortly after, made threats to another patient, verbally abused staff and postured towards them. Writer gave meds to calm pt down and was removed from the area into a different side of the ED. Pt is now in a room, calm. 1:1 present. Awaiting discharge.

## 2023-07-04 NOTE — ED PROVIDER NOTES
ED Provider Note  [facility]      History     Chief Complaint   Patient presents with     Suicidal     Suicide Attempt     HPI  Sadaf Ross is a 22 year old female who is here after she called the nurses line, reporting that she was feeling suicidal. This is her 4th visit here since July 1st 2023. Patient was seen last night here after she had dinner at her group home. She proceeded to threaten to kill me repeatedly. After she received a haldol shot, she was no longer threatening to kill me, only asking to be left alone. She was discharged back to her group home. Staff reports she refused her meds this morning but had been in a good mood, enjoying lunch and the 4th of July festivities. She then again got mad this evening and was slamming doors. She called the nurse line and had ambulance transport here.    Patient was enjoying the stimulation of the milieu, especially an ongoing behavior code of another patient. She was speaking with the 1:1 sitter in a calm manner. She again started to threaten to kill me when I started asking her questions. She also was threatening an 11 year old nearby who was overly-stimulated by the behavioral code in the ED.    Patient got IM haldol 10 mg and benadryl 50 mg due to her threats.    I talked to the group home staff who felt patient was having a good, unremarkable day. Staff reports there will be a meeting tomorrow to determine guardianship and trying to reduce her excessive use of the ED.    Past Medical History  Past Medical History:   Diagnosis Date     ADHD (attention deficit hyperactivity disorder)      Bipolar 1 disorder (H)      Borderline personality disorder (H)      Depression      Depressive disorder      Intellectual disability      Obesity      Syncope      Past Surgical History:   Procedure Laterality Date     APPENDECTOMY       APPENDECTOMY       acetaminophen (TYLENOL) 325 MG tablet  albuterol (PROAIR HFA/PROVENTIL HFA/VENTOLIN  HFA) 108 (90 Base) MCG/ACT inhaler  alum & mag hydroxide-simethicone (MAALOX  ES) 400-400-40 MG/5ML SUSP suspension  ARIPiprazole lauroxil ER (ARISTADA) 882 MG/3.2ML intra-muscular  benzonatate (TESSALON) 200 MG capsule  benztropine (COGENTIN) 1 MG tablet  bisacodyl (DULCOLAX) 5 MG EC tablet  calcium carbonate (TUMS) 500 MG chewable tablet  clobetasol (TEMOVATE) 0.05 % CREA cream  cyclobenzaprine (FLEXERIL) 10 MG tablet  diclofenac (VOLTAREN) 1 % topical gel  docusate sodium (COLACE) 50 MG capsule  fluticasone (FLONASE) 50 MCG/ACT nasal spray  guaiFENesin (MUCINEX) 600 MG 12 hr tablet  hydrocortisone (CORTAID) 0.5 % external cream  hydrocortisone, Perianal, (HYDROCORTISONE) 2.5 % cream  hydrOXYzine (ATARAX) 50 MG tablet  hyoscyamine (LEVSIN/SL) 0.125 MG sublingual tablet  ibuprofen (ADVIL/MOTRIN) 400 MG tablet  loperamide (IMODIUM) 2 MG capsule  loratadine (CLARITIN) 10 MG tablet  LORazepam (ATIVAN) 0.5 MG tablet  multivitamin, therapeutic (THERA-VIT) TABS tablet  mupirocin (BACTROBAN) 2 % external ointment  OLANZapine zydis (ZYPREXA) 5 MG ODT  omeprazole (PRILOSEC) 40 MG DR capsule  ondansetron (ZOFRAN) 4 MG tablet  ondansetron (ZOFRAN) 4 MG tablet  polyethylene glycol (MIRALAX) 17 GM/Dose powder  prochlorperazine (COMPAZINE) 10 MG tablet  promethazine (PHENERGAN) 12.5 MG tablet  sennosides (SENOKOT) 8.6 MG tablet  sulfamethoxazole-trimethoprim (BACTRIM DS) 800-160 MG tablet  traZODone (DESYREL) 50 MG tablet  venlafaxine (EFFEXOR-ER) 225 MG 24 hr tablet  Vitamin D, Cholecalciferol, 25 MCG (1000 UT) TABS      Allergies   Allergen Reactions     Penicillins Rash and Unknown     Family History  Family History   Problem Relation Age of Onset     Diabetes Type 1 Father      Cancer Paternal Grandfather      Social History   Social History     Tobacco Use     Smoking status: Every Day     Packs/day: 1.00     Years: 5.00     Pack years: 5.00     Types: Cigarettes     Smokeless tobacco: Never   Substance Use Topics      Alcohol use: No     Drug use: No         A medically appropriate review of systems was performed with pertinent positives and negatives noted in the HPI, and all other systems negative.    Physical Exam      Physical Exam  Vitals and nursing note reviewed.   HENT:      Head: Normocephalic.   Eyes:      Pupils: Pupils are equal, round, and reactive to light.   Pulmonary:      Effort: Pulmonary effort is normal.   Musculoskeletal:         General: Normal range of motion.      Cervical back: Normal range of motion.   Neurological:      General: No focal deficit present.      Mental Status: She is alert.   Psychiatric:         Attention and Perception: Attention and perception normal.         Mood and Affect: Mood and affect normal.         Speech: Speech normal.         Behavior: Behavior is agitated and aggressive. Behavior is cooperative.         Thought Content: Thought content normal. Thought content is not paranoid or delusional. Thought content does not include homicidal or suicidal ideation.         Cognition and Memory: Cognition and memory normal.         Judgment: Judgment normal.           ED Course, Procedures, & Data      Procedures       Mental Health Risk Assessment      PSS-3    Date and Time Over the past 2 weeks have you felt down, depressed, or hopeless? Over the past 2 weeks have you had thoughts of killing yourself? Have you ever attempted to kill yourself? When did this last happen? User   07/04/23 1746 yes yes yes within the last 24 hours (including today) [facility]                Item Assessment   Suicidal Ideation None   Plan None   Intent None   Suicidal or self-harm behaviors Threatening to cut or overdose   Risk Factors Previous psychiatric diagnosis and treatments   Protective Factors Fear of death or dying due to pain and suffering              No results found for any visits on 07/04/23.  Medications   haloperidol lactate (HALDOL) injection 10 mg (has no administration in time range)    diphenhydrAMINE (BENADRYL) injection 50 mg (has no administration in time range)   haloperidol lactate (HALDOL) injection 10 mg (10 mg Intramuscular $Given 7/4/23 1809)   diphenhydrAMINE (BENADRYL) injection 50 mg (50 mg Intramuscular $Given 7/4/23 1809)     Labs Ordered and Resulted from Time of ED Arrival to Time of ED Departure - No data to display  No orders to display          Critical care was performed.   Critical Care Addendum  My initial assessment, based on my review of nursing observations and focused history, established a high suspicion that Sadaf Ross has severe agitation, which requires immediate intervention, and therefore she is critically ill.     After the initial assessment, the care team initiated medication therapy with IM haldol and benadryl to provide stabilization care. Due to the critical nature of this patient, I reassessed mental status multiple times prior to her disposition.     Time also spent performing documentation, discussion with family to obtain medical information for decision making and coordination of care.     Critical care time (excluding teaching time and procedures): 20 minutes.     Assessment & Plan    Patient is here via EMS from her group home after she called the nurses line and was threatening suicide. Patient has a chronic pattern of engaging in this behavior. She had been enjoying the 4th of July festivities today and staff could not identify any triggers to her wanting to come to the ED. She was seen here yesterday and was threatening to kill the provider (me) upon interview. She received IM haldol and benadryl and was calmer and discharged back to her group home.    Today patient again was threatening the provider when she was being interviewed. She also was threatening to kill an 12 yo patient who was in a nearby room. She received IM haldol and benadryl due to her threats. When she was calmer, I discussed with her about the inappropriateness of  threatening to kill people and I will notify the police and will press charges for verbal assault in the future. Patient appeared to understand the ramification of her future homicidal threats. She was ready to return to her group home. Patient can be discharged via cab.    I have reviewed the nursing notes. I have reviewed the findings, diagnosis, plan and need for follow up with the patient.    New Prescriptions    No medications on file       Final diagnoses:   Intellectual disability   Severe mood dysregulation disorder (H)       Magno Sena MD  [facility] EMERGENCY DEPARTMENT  7/4/2023     Magno Sena MD  07/04/23 4720

## 2023-07-04 NOTE — ED PROVIDER NOTES
[facility] EMERGENCY DEPARTMENT ([facility])    7/03/23      ED PROVIDER NOTE  [facility]A      History     Chief Complaint   Patient presents with     Suicidal     Chronic SI, no change from baseline      HPI  Sadaf Ross is a 22 year old female with care plan in place and a past medical history of SI, intellectual disability, bipolar 1 disorder, ADHD, borderline personality disorder, and depression who presents to the emergency department for evaluation of emotional volatily. Patient had presented to the ED earlier today and while waiting in triage, decided to go home to have her dinner. Per staff she then wanted to return to the ED. Here she denies acute SI. She is irritable and does not want to get her vitals taken. She was yelling at the other patient when they told her to be nice. She was shouting profanities at me and threatening to kill me. Patient was refusing any offers of oral meds.  staff report she has been refusing her home meds.    Per chart review, this is patient's 19th ED visit to [facility] since 6/9/2023.    Past Medical History  Past Medical History:   Diagnosis Date     ADHD (attention deficit hyperactivity disorder)      Bipolar 1 disorder (H)      Borderline personality disorder (H)      Depression      Depressive disorder      Intellectual disability      Obesity      Syncope      Past Surgical History:   Procedure Laterality Date     APPENDECTOMY       APPENDECTOMY       acetaminophen (TYLENOL) 325 MG tablet  albuterol (PROAIR HFA/PROVENTIL HFA/VENTOLIN HFA) 108 (90 Base) MCG/ACT inhaler  alum & mag hydroxide-simethicone (MAALOX  ES) 400-400-40 MG/5ML SUSP suspension  ARIPiprazole lauroxil ER (ARISTADA) 882 MG/3.2ML intra-muscular  benzonatate (TESSALON) 200 MG capsule  benztropine (COGENTIN) 1 MG tablet  bisacodyl (DULCOLAX) 5 MG EC tablet  calcium carbonate (TUMS) 500 MG chewable tablet  clobetasol (TEMOVATE) 0.05 % CREA cream  cyclobenzaprine (FLEXERIL) 10 MG  tablet  diclofenac (VOLTAREN) 1 % topical gel  docusate sodium (COLACE) 50 MG capsule  fluticasone (FLONASE) 50 MCG/ACT nasal spray  guaiFENesin (MUCINEX) 600 MG 12 hr tablet  hydrocortisone (CORTAID) 0.5 % external cream  hydrocortisone, Perianal, (HYDROCORTISONE) 2.5 % cream  hydrOXYzine (ATARAX) 50 MG tablet  hyoscyamine (LEVSIN/SL) 0.125 MG sublingual tablet  ibuprofen (ADVIL/MOTRIN) 400 MG tablet  loperamide (IMODIUM) 2 MG capsule  loratadine (CLARITIN) 10 MG tablet  LORazepam (ATIVAN) 0.5 MG tablet  multivitamin, therapeutic (THERA-VIT) TABS tablet  mupirocin (BACTROBAN) 2 % external ointment  OLANZapine zydis (ZYPREXA) 5 MG ODT  omeprazole (PRILOSEC) 40 MG DR capsule  ondansetron (ZOFRAN) 4 MG tablet  ondansetron (ZOFRAN) 4 MG tablet  polyethylene glycol (MIRALAX) 17 GM/Dose powder  prochlorperazine (COMPAZINE) 10 MG tablet  promethazine (PHENERGAN) 12.5 MG tablet  sennosides (SENOKOT) 8.6 MG tablet  sulfamethoxazole-trimethoprim (BACTRIM DS) 800-160 MG tablet  traZODone (DESYREL) 50 MG tablet  venlafaxine (EFFEXOR-ER) 225 MG 24 hr tablet  Vitamin D, Cholecalciferol, 25 MCG (1000 UT) TABS      Allergies   Allergen Reactions     Penicillins Rash and Unknown     Family History  Family History   Problem Relation Age of Onset     Diabetes Type 1 Father      Cancer Paternal Grandfather      Social History   Social History     Tobacco Use     Smoking status: Every Day     Packs/day: 1.00     Years: 5.00     Pack years: 5.00     Types: Cigarettes     Smokeless tobacco: Never   Substance Use Topics     Alcohol use: No     Drug use: No         A medically appropriate review of systems was performed with pertinent positives and negatives noted in the HPI, and all other systems negative.    Physical Exam      Physical Exam  Vitals and nursing note reviewed.   HENT:      Head: Normocephalic.   Eyes:      Pupils: Pupils are equal, round, and reactive to light.   Pulmonary:      Effort: Pulmonary effort is normal.    Musculoskeletal:         General: Normal range of motion.      Cervical back: Normal range of motion.   Neurological:      General: No focal deficit present.      Mental Status: She is alert.   Psychiatric:         Attention and Perception: Attention and perception normal.         Mood and Affect: Mood normal. Affect is labile and angry.         Behavior: Behavior is uncooperative, agitated and aggressive.         Thought Content: Thought content normal. Thought content is not paranoid or delusional. Thought content does not include homicidal or suicidal ideation.         Cognition and Memory: Cognition and memory normal.         Judgment: Judgment normal.           ED Course, Procedures, & Data      Procedures         Mental Health Risk Assessment      PSS-3    Date and Time Over the past 2 weeks have you felt down, depressed, or hopeless? Over the past 2 weeks have you had thoughts of killing yourself? Have you ever attempted to kill yourself? When did this last happen? User   07/03/23 2031 yes yes no -- [facility]      C-SSRS ([place])    Date and Time Q1 Wished to be Dead (Past Month) Q2 Suicidal Thoughts (Past Month) Q3 Suicidal Thought Method Q4 Suicidal Intent without Specific Plan Q5 Suicide Intent with Specific Plan Q6 Suicide Behavior (Lifetime) Within the Past 3 Months? RETIRED: Level of Risk per Screen Screening Not Complete User   07/03/23 2031 yes yes yes no yes yes -- -- -- MEM                Item Assessment   Suicidal Ideation Suicidal thoughts   Plan None   Intent None   Suicidal or self-harm behaviors none active   Risk Factors Refuses or feels unable to agree to safety plan   Protective Factors Fear of death or dying due to pain and suffering        No results found for any visits on 07/03/23.  Medications   haloperidol lactate (HALDOL) injection 5 mg (5 mg Intramuscular $Given 7/3/23 2116)   diphenhydrAMINE (BENADRYL) injection 25 mg (25 mg Intramuscular $Given 7/3/23 2116)     Labs Ordered and  Resulted from Time of ED Arrival to Time of ED Departure - No data to display  No orders to display          Critical care was performed.   Critical Care Addendum  My initial assessment, based on my review of prehospital provider report, review of nursing observations and focused history, established a high suspicion that Sadaf Ross has severe agitation, which requires immediate intervention, and therefore she is critically ill.     After the initial assessment, the care team initiated medication therapy with IM haldol and benadryl to provide stabilization care. Due to the critical nature of this patient, I reassessed nursing observations, physical exam and mental status multiple times prior to her disposition.     Time also spent performing documentation and discussion with family to obtain medical information for decision making.     Critical care time (excluding teaching time and procedures): 20  minutes.     As patient was threatening to kill her ED provider, a behavioral code was instituted and she was given IM haldol and benadryl for behavioral control.    Assessment & Plan    Patient is here for a mental health assessment. She is well-known to the ED due to her multiple visits. She was coming to be seen here earlier today but left triage to have dinner at her group home, then decided to return. She refused to engage with the nurse and myself, threatening to kill me multiple times. She then received IM haldol and benadryl and was calmer in appearance, no longer threatening to kill me.    Patient appears at baseline. She can be discharged back to her group home. Staff has no concerns. She was discharged back via cab.    I have reviewed the nursing notes. I have reviewed the findings, diagnosis, plan and need for follow up with the patient.    New Prescriptions    No medications on file       Final diagnoses:   Intellectual disability   Severe mood dysregulation disorder (H)         [facility]  EMERGENCY DEPARTMENT  7/3/2023     Magno Sena MD  07/03/23 5707

## 2023-07-04 NOTE — ED NOTES
Bed: [facility]RE-A  Expected date: 7/4/23  Expected time: 5:20 PM  Means of arrival:   Comments:  N 717: 23 y/o, F , SI

## 2023-07-08 ENCOUNTER — NURSE TRIAGE (OUTPATIENT)
Dept: NURSING | Facility: CLINIC | Age: 23
End: 2023-07-08
Payer: MEDICARE

## 2023-07-08 NOTE — TELEPHONE ENCOUNTER
"The patient complains of \"having a mental breakdown\" listening to Trevor Zamora says she is missing dad.  She reports being suicidal but does not have a plan  She says she vomited her antidepressant medication and did not take any nausea medication to prevent vomiting.  Triage guidelines recommend to see pcp within 24 hours  Caller verbalized and understands directives  She says she will go to the ED    Reason for Disposition    Suicide thoughts, threats, attempts, or questions    Depression symptoms (sadness, hopelessness, decreased energy) interfere with work or school    Sometimes has thoughts of suicide    Additional Information    Negative: Patient attempted suicide    Negative: Patient is threatening suicide now    Negative: Violent behavior, or threatening to physically hurt or kill someone    Negative: [1] Patient is very confused (disoriented, slurred speech) AND [2] no other adult (e.g., friend or family member) available    Negative: [1] Difficult to awaken or acting very confused (disoriented, slurred speech) AND [2] new-onset    Negative: Sounds like a life-threatening emergency to the triager    Negative: Patient attempted suicide    Negative: Patient is threatening suicide now    Negative: Violent behavior, or threatening to physically hurt or kill someone    Negative: [1] Patient is very confused (disoriented, slurred speech) AND [2] no other adult (e.g., friend or family member) available    Negative: [1] Difficult to awaken or acting very confused (disoriented, slurred speech) AND [2] new-onset    Negative: Sounds like a life-threatening emergency to the triager    Negative: Depression is main symptom and is not threatening suicide    Negative: [1] Depression symptoms (sadness, hopelessness, decreased energy) AND [2] unable to do any normal activities (e.g., self care, school, work; in comparison to baseline).    Negative: Patient sounds very sick or weak to the triager    Negative: [1] Patient " is not threatening suicide now BUT [2] has a suicide PLAN (e.g., overdose, gunshot) and ACCESS (e.g., collecting pills, gun in house)    Negative: [1] Patient is not threatening suicide now BUT [2] had SUICIDAL BEHAVIORS in past 3 months    Protocols used: DEPRESSION-A-AH, SUICIDE [id number]

## 2023-07-10 ENCOUNTER — NURSE TRIAGE (OUTPATIENT)
Dept: NURSING | Facility: CLINIC | Age: 23
End: 2023-07-10
Payer: MEDICARE

## 2023-07-10 NOTE — TELEPHONE ENCOUNTER
Nurse Triage SBAR    Is this a 2nd Level Triage? NO    Situation: Possibly hives    Background: Patient describes that she has small round red puffy bumps the size of a dime all over her body and that they are very itchy.  Patient denies any signs or symptoms of anaphylaxis.  Patient has not taken any antihistamines.    Assessment: Possible hives    Protocol Recommended Disposition:   See PCP Within 24 Hours    Recommendation: Patient verbalizes understanding of care advised    Casi Garcias RN on 7/10/2023 at 6:02 PM      Does the patient meet one of the following criteria for ADS visit consideration? No    Reason for Disposition    [1] Abdominal pain AND [2] pain present > 12 hours    Additional Information    Negative: [1] Life-threatening reaction (anaphylaxis) in the past to similar substance (e.g., food, insect bite/sting, chemical, etc.) AND [2] < 2 hours since exposure    Negative: Difficulty breathing or wheezing now    Negative: [1] Swollen tongue AND [2] rapid onset    Negative: [1] Hoarseness or cough now AND [2] rapid onset    Negative: Shock suspected (e.g., cold/pale/clammy skin, too weak to stand, low BP, rapid pulse)    Negative: Difficult to awaken or acting confused (e.g., disoriented, slurred speech)    Negative: Sounds like a life-threatening emergency to the triager    Negative: Swollen tongue    Negative: [1] Widespread hives, itching or facial swelling AND [2] onset < 2 hours of exposure to high-risk allergen (e.g., sting, nuts, 1st dose of antibiotic)    Negative: Patient sounds very sick or weak to the triager    Negative: [1] MODERATE-SEVERE hives persist (i.e., hives interfere with normal activities or work) AND [2] taking antihistamine (e.g., Benadryl, Claritin) > 24 hours    Negative: [1] Hives have become worse AND [2] taking oral steroids (e.g., prednisone) > 24 hours    Negative: Joint swelling    Protocols used: HIVES-A-

## 2023-07-11 ENCOUNTER — HOSPITAL ENCOUNTER (EMERGENCY)
Facility: CLINIC | Age: 23
Discharge: HOME | End: 2023-07-12
Attending: FAMILY MEDICINE | Admitting: FAMILY MEDICINE
Payer: MEDICARE

## 2023-07-11 VITALS
DIASTOLIC BLOOD PRESSURE: 85 MMHG | RESPIRATION RATE: 18 BRPM | HEART RATE: 100 BPM | OXYGEN SATURATION: 98 % | TEMPERATURE: 98.1 F | SYSTOLIC BLOOD PRESSURE: 132 MMHG

## 2023-07-11 DIAGNOSIS — F79 INTELLECTUAL DISABILITY: Chronic | ICD-10-CM

## 2023-07-11 DIAGNOSIS — F31.32 BIPOLAR AFFECTIVE DISORDER, CURRENTLY DEPRESSED, MODERATE (H): ICD-10-CM

## 2023-07-11 DIAGNOSIS — F60.3 BORDERLINE PERSONALITY DISORDER (H): Chronic | ICD-10-CM

## 2023-07-11 PROCEDURE — 99284 EMERGENCY DEPT VISIT MOD MDM: CPT | Performed by: FAMILY MEDICINE

## 2023-07-11 PROCEDURE — 99283 EMERGENCY DEPT VISIT LOW MDM: CPT | Performed by: FAMILY MEDICINE

## 2023-07-11 ASSESSMENT — ACTIVITIES OF DAILY LIVING (ADL): ADLS_ACUITY_SCORE: 37

## 2023-07-12 NOTE — ED NOTES
Pt discharged back home to group home. Pt left ambulatory with good gait and AVS given to meds. Pt to follow up with outpatient resources. Paramedics took pt back to group home.

## 2023-07-12 NOTE — ED TRIAGE NOTES
Pt presented via EMS from group home. Pt was upset with staff and called 911. Pt wants to talk to someone.     Triage Assessment     Row Name 07/11/23 2308       Triage Assessment (Adult)    Airway WDL WDL       Respiratory WDL    Respiratory WDL WDL       Cardiac WDL    Cardiac WDL WDL       Cognitive/Neuro/Behavioral WDL    Cognitive/Neuro/Behavioral WDL all    Level of Consciousness alert    Arousal Level opens eyes spontaneously    Orientation oriented x 4    Mood/Behavior calm

## 2023-07-12 NOTE — ED NOTES
Bed: POLLOD[facility]-F  Expected date: 7/11/23  Expected time:   Means of arrival:   Comments:  [id number] 21 y/o F   Doesn't like group home and can't sleep

## 2023-07-12 NOTE — ED PROVIDER NOTES
[facility] EMERGENCY DEPARTMENT ([facility])    7/11/23         History     Chief Complaint   Patient presents with     Psychiatric Evaluation     Behavioral Problem     HPI  Sadaf Ross is a 22 year old female who with PMH of borderline personality disorder, bipolar affective disorder, morbid obesity, and suicidal ideation presents to the ED with psychiatric evaluation and behavorial problem.  Patient is well-known to the emergency room staff she arrives from her group home after having episodes of fleeting thoughts of suicide associated with thinking about the death of her father and the death of her grandmother.  At this time patient denies any acute intent and is feeling as though she could be safe returning to the group home    Past Medical History  Past Medical History:   Diagnosis Date     ADHD (attention deficit hyperactivity disorder)      Bipolar 1 disorder (H)      Borderline personality disorder (H)      Depression      Depressive disorder      Intellectual disability      Obesity      Syncope      Past Surgical History:   Procedure Laterality Date     APPENDECTOMY       APPENDECTOMY       acetaminophen (TYLENOL) 325 MG tablet  albuterol (PROAIR HFA/PROVENTIL HFA/VENTOLIN HFA) 108 (90 Base) MCG/ACT inhaler  alum & mag hydroxide-simethicone (MAALOX  ES) 400-400-40 MG/5ML SUSP suspension  ARIPiprazole lauroxil ER (ARISTADA) 882 MG/3.2ML intra-muscular  benzonatate (TESSALON) 200 MG capsule  benztropine (COGENTIN) 1 MG tablet  bisacodyl (DULCOLAX) 5 MG EC tablet  calcium carbonate (TUMS) 500 MG chewable tablet  clobetasol (TEMOVATE) 0.05 % CREA cream  cyclobenzaprine (FLEXERIL) 10 MG tablet  diclofenac (VOLTAREN) 1 % topical gel  docusate sodium (COLACE) 50 MG capsule  fluticasone (FLONASE) 50 MCG/ACT nasal spray  guaiFENesin (MUCINEX) 600 MG 12 hr tablet  hydrocortisone (CORTAID) 0.5 % external cream  hydrocortisone, Perianal, (HYDROCORTISONE) 2.5 % cream  hydrOXYzine (ATARAX) 50 MG  tablet  hyoscyamine (LEVSIN/SL) 0.125 MG sublingual tablet  ibuprofen (ADVIL/MOTRIN) 400 MG tablet  loperamide (IMODIUM) 2 MG capsule  loratadine (CLARITIN) 10 MG tablet  LORazepam (ATIVAN) 0.5 MG tablet  multivitamin, therapeutic (THERA-VIT) TABS tablet  mupirocin (BACTROBAN) 2 % external ointment  OLANZapine zydis (ZYPREXA) 5 MG ODT  omeprazole (PRILOSEC) 40 MG DR capsule  ondansetron (ZOFRAN) 4 MG tablet  ondansetron (ZOFRAN) 4 MG tablet  polyethylene glycol (MIRALAX) 17 GM/Dose powder  prochlorperazine (COMPAZINE) 10 MG tablet  promethazine (PHENERGAN) 12.5 MG tablet  sennosides (SENOKOT) 8.6 MG tablet  sulfamethoxazole-trimethoprim (BACTRIM DS) 800-160 MG tablet  traZODone (DESYREL) 50 MG tablet  venlafaxine (EFFEXOR-ER) 225 MG 24 hr tablet  Vitamin D, Cholecalciferol, 25 MCG (1000 UT) TABS      Allergies   Allergen Reactions     Penicillins Rash and Unknown     Family History  Family History   Problem Relation Age of Onset     Diabetes Type 1 Father      Cancer Paternal Grandfather      Social History   Social History     Tobacco Use     Smoking status: Every Day     Packs/day: 1.00     Years: 5.00     Pack years: 5.00     Types: Cigarettes     Smokeless tobacco: Never   Substance Use Topics     Alcohol use: No     Drug use: No         A medically appropriate review of systems was performed with pertinent positives and negatives noted in the HPI, and all other systems negative.    Physical Exam   BP: 132/85  Pulse: 100  Temp: 98.1  F (36.7  C)  Resp: 18  SpO2: 98 %  Physical Exam  Constitutional:       General: She is not in acute distress.     Appearance: Normal appearance. She is not diaphoretic.   HENT:      Head: Atraumatic.      Mouth/Throat:      Mouth: Mucous membranes are moist.   Eyes:      General: No scleral icterus.     Conjunctiva/sclera: Conjunctivae normal.   Cardiovascular:      Rate and Rhythm: Normal rate.      Heart sounds: Normal heart sounds.   Pulmonary:      Effort: No respiratory  distress.      Breath sounds: Normal breath sounds.   Abdominal:      General: Abdomen is flat.   Musculoskeletal:      Cervical back: Neck supple.   Skin:     General: Skin is warm.      Findings: No rash.   Neurological:      General: No focal deficit present.      Mental Status: She is alert and oriented to person, place, and time.      Sensory: No sensory deficit.      Motor: No weakness.      Coordination: Coordination normal.   Psychiatric:         Mood and Affect: Mood is anxious.         Speech: Speech normal.         Behavior: Behavior is cooperative.         Thought Content: Thought content does not include homicidal or suicidal ideation.           ED Course, Procedures, & Data      Procedures           Medications - No data to display  Labs Ordered and Resulted from Time of ED Arrival to Time of ED Departure - No data to display  No orders to display          Critical care was not performed.     Medical Decision Making  The patient's presentation was of moderate complexity (a chronic illness mild to moderate exacerbation, progression, or side effect of treatment).    The patient's evaluation involved:  history and exam without other MDM data elements    The patient's management necessitated high risk (a decision regarding hospitalization).      Assessment & Plan        I have reviewed the nursing notes. I have reviewed the findings, diagnosis, plan and need for follow up with the patient.    Presenting initially with suicidal ideation with discussion of need for hospitalization versus outpatient management patient stabilized while in the emergency room will be discharged back to her group home.    Final diagnoses:   Borderline personality disorder (H)   Intellectual disability   Bipolar affective disorder, currently depressed, moderate (H)         [facility] EMERGENCY DEPARTMENT  7/11/2023     Robert Olea MD  07/12/23 0451

## 2023-07-14 ENCOUNTER — HOSPITAL ENCOUNTER (EMERGENCY)
Facility: CLINIC | Age: 23
Discharge: HOME | End: 2023-07-14
Attending: FAMILY MEDICINE | Admitting: FAMILY MEDICINE
Payer: MEDICARE

## 2023-07-14 VITALS
TEMPERATURE: 98.9 F | HEART RATE: 85 BPM | SYSTOLIC BLOOD PRESSURE: 119 MMHG | RESPIRATION RATE: 16 BRPM | DIASTOLIC BLOOD PRESSURE: 79 MMHG | WEIGHT: 241 LBS | BODY MASS INDEX: 41.15 KG/M2 | HEIGHT: 64 IN | OXYGEN SATURATION: 97 %

## 2023-07-14 DIAGNOSIS — F60.3 BORDERLINE PERSONALITY DISORDER (H): Chronic | ICD-10-CM

## 2023-07-14 DIAGNOSIS — Z91.89 AT RISK FOR SELF-INFLICTED INJURY: ICD-10-CM

## 2023-07-14 DIAGNOSIS — F79 INTELLECTUAL DISABILITY: Chronic | ICD-10-CM

## 2023-07-14 LAB
AMPHETAMINES UR QL SCN: NORMAL
BARBITURATES UR QL SCN: NORMAL
BENZODIAZ UR QL SCN: NORMAL
BZE UR QL SCN: NORMAL
CANNABINOIDS UR QL SCN: NORMAL
OPIATES UR QL SCN: NORMAL

## 2023-07-14 PROCEDURE — 80307 DRUG TEST PRSMV CHEM ANLYZR: CPT | Performed by: INTERNAL MEDICINE

## 2023-07-14 PROCEDURE — 250N000013 HC RX MED GY IP 250 OP 250 PS 637: Performed by: FAMILY MEDICINE

## 2023-07-14 PROCEDURE — 90791 PSYCH DIAGNOSTIC EVALUATION: CPT

## 2023-07-14 PROCEDURE — 99285 EMERGENCY DEPT VISIT HI MDM: CPT | Mod: 25 | Performed by: FAMILY MEDICINE

## 2023-07-14 PROCEDURE — 99285 EMERGENCY DEPT VISIT HI MDM: CPT | Performed by: FAMILY MEDICINE

## 2023-07-14 RX ORDER — PANTOPRAZOLE SODIUM 40 MG/1
40 TABLET, DELAYED RELEASE ORAL DAILY
Status: DISCONTINUED | OUTPATIENT
Start: 2023-07-14 | End: 2023-07-15 | Stop reason: HOSPADM

## 2023-07-14 RX ORDER — HYDROXYZINE HYDROCHLORIDE 50 MG/1
50 TABLET, FILM COATED ORAL 2 TIMES DAILY PRN
Status: DISCONTINUED | OUTPATIENT
Start: 2023-07-14 | End: 2023-07-15 | Stop reason: HOSPADM

## 2023-07-14 RX ORDER — FLUOXETINE 40 MG/1
80 CAPSULE ORAL DAILY
COMMUNITY

## 2023-07-14 RX ORDER — PANTOPRAZOLE SODIUM 40 MG/1
40 TABLET, DELAYED RELEASE ORAL DAILY
COMMUNITY

## 2023-07-14 RX ORDER — CEPHALEXIN 500 MG/1
500 CAPSULE ORAL 3 TIMES DAILY
COMMUNITY
Start: 2023-07-11 | End: 2023-07-18

## 2023-07-14 RX ORDER — DICYCLOMINE HCL 20 MG
20 TABLET ORAL 2 TIMES DAILY PRN
COMMUNITY

## 2023-07-14 RX ORDER — LORAZEPAM 0.5 MG/1
0.5 TABLET ORAL
Status: DISCONTINUED | OUTPATIENT
Start: 2023-07-14 | End: 2023-07-15 | Stop reason: HOSPADM

## 2023-07-14 RX ORDER — FAMOTIDINE 20 MG/1
20 TABLET, FILM COATED ORAL DAILY
COMMUNITY

## 2023-07-14 RX ORDER — TRAZODONE HYDROCHLORIDE 100 MG/1
100 TABLET ORAL AT BEDTIME
Status: DISCONTINUED | OUTPATIENT
Start: 2023-07-14 | End: 2023-07-15 | Stop reason: HOSPADM

## 2023-07-14 RX ORDER — OLANZAPINE 10 MG/1
10 TABLET, ORALLY DISINTEGRATING ORAL ONCE
Status: COMPLETED | OUTPATIENT
Start: 2023-07-14 | End: 2023-07-14

## 2023-07-14 RX ADMIN — PANTOPRAZOLE SODIUM 40 MG: 40 TABLET, DELAYED RELEASE ORAL at 20:49

## 2023-07-14 RX ADMIN — FLUOXETINE 80 MG: 20 CAPSULE ORAL at 20:49

## 2023-07-14 RX ADMIN — OLANZAPINE 10 MG: 10 TABLET, ORALLY DISINTEGRATING ORAL at 15:42

## 2023-07-14 ASSESSMENT — COLUMBIA-SUICIDE SEVERITY RATING SCALE - C-SSRS
TOTAL  NUMBER OF ABORTED OR SELF INTERRUPTED ATTEMPTS PAST 3 MONTHS: NO
TOTAL  NUMBER OF ACTUAL ATTEMPTS LIFETIME: 1
1. IN THE PAST MONTH, HAVE YOU WISHED YOU WERE DEAD OR WISHED YOU COULD GO TO SLEEP AND NOT WAKE UP?: YES
ATTEMPT PAST THREE MONTHS: NO
TOTAL  NUMBER OF INTERRUPTED ATTEMPTS LIFETIME: NO
1. HAVE YOU WISHED YOU WERE DEAD OR WISHED YOU COULD GO TO SLEEP AND NOT WAKE UP?: YES
REASONS FOR IDEATION LIFETIME: EQUALLY TO GET ATTENTION, REVENGE, OR A REACTION FROM OTHERS AND TO END/STOP THE PAIN
6. HAVE YOU EVER DONE ANYTHING, STARTED TO DO ANYTHING, OR PREPARED TO DO ANYTHING TO END YOUR LIFE?: NO
TOTAL  NUMBER OF ABORTED OR SELF INTERRUPTED ATTEMPTS LIFETIME: 1
2. HAVE YOU ACTUALLY HAD ANY THOUGHTS OF KILLING YOURSELF?: NO
ATTEMPT LIFETIME: YES
TOTAL  NUMBER OF ABORTED OR SELF INTERRUPTED ATTEMPTS LIFETIME: YES
REASONS FOR IDEATION PAST MONTH: EQUALLY TO GET ATTENTION, REVENGE, OR A REACTION FROM OTHERS AND TO END/STOP THE PAIN

## 2023-07-14 ASSESSMENT — ACTIVITIES OF DAILY LIVING (ADL)
ADLS_ACUITY_SCORE: 37

## 2023-07-14 NOTE — ED NOTES
Bed: [facility]  Expected date: 7/14/23  Expected time: 1:15 PM  Means of arrival: Ambulance  Comments:  [place] [address][age]yo female, suicidal

## 2023-07-14 NOTE — ED NOTES
Pt stating SI with superficial lacerations on the L forearm, HI to choke people, and experiencing visual and auditory hallucinations. Visual hallucinations - seeing dead people surrounding her and hearing voices telling her to jump off a bridge when she gets discharged. Pt also stating she has generalized body pain and she has not been sleeping the past few nights because her suicidal thoughts consume her. Pt calm in HW, 1:1 with pt.

## 2023-07-14 NOTE — ED TRIAGE NOTES
States that she is upset because her roommate told her to go kill herself     Triage Assessment     Row Name 07/14/23 3898       Triage Assessment (Adult)    Airway WDL WDL       Respiratory WDL    Respiratory WDL WDL       Skin Circulation/Temperature WDL    Skin Circulation/Temperature WDL X  superficial lacerations to left forearm       Cardiac WDL    Cardiac WDL WDL       Peripheral/Neurovascular WDL    Peripheral Neurovascular WDL WDL       Cognitive/Neuro/Behavioral WDL    Cognitive/Neuro/Behavioral WDL WDL

## 2023-07-14 NOTE — ED NOTES
Sitter notifying writer that pt is picking at superficial lacerations on L forearm. Writer wrapped L forearm with nataly pt accepted.

## 2023-07-14 NOTE — ED PROVIDER NOTES
[facility] [place] ([facility])    7/14/23      ED PROVIDER NOTE    History     Chief Complaint   Patient presents with     Self Harm - Deliberate     Cutting to left forearm     HPI  Sadaf Ross is a 22 year old female with past medical history significant for borderline personality disorder, bipolar affective disorder, and suicidal ideation who was brought in by ambulance to the ED for self harm to left forearm.  Patient notes that she has been having conflict with her roommate there was accusation of someone stealing something from someone and as a result Sadaf has become intent on her roommate being destructive and the escalation led to self-injurious behaviors today with superficial cutting on the left wrist.  Patient has a long history of self-injurious behaviors and suicidal thoughts however at this time patient is escalated to behaviors refusing medications and will require stabilization here in the emergency room for period of time.    Past Medical History  Past Medical History:   Diagnosis Date     ADHD (attention deficit hyperactivity disorder)      Bipolar 1 disorder (H)      Borderline personality disorder (H)      Depression      Depressive disorder      Intellectual disability      Obesity      Syncope      Past Surgical History:   Procedure Laterality Date     APPENDECTOMY       APPENDECTOMY       acetaminophen (TYLENOL) 325 MG tablet  albuterol (PROAIR HFA/PROVENTIL HFA/VENTOLIN HFA) 108 (90 Base) MCG/ACT inhaler  bisacodyl (DULCOLAX) 5 MG EC tablet  calcium carbonate (TUMS) 500 MG chewable tablet  cephALEXin (KEFLEX) 500 MG capsule  cyclobenzaprine (FLEXERIL) 10 MG tablet  dicyclomine (BENTYL) 20 MG tablet  docusate sodium (COLACE) 50 MG capsule  famotidine (PEPCID) 20 MG tablet  FLUoxetine (PROZAC) 40 MG capsule  hydrocortisone, Perianal, (HYDROCORTISONE) 2.5 % cream  hydrOXYzine (ATARAX) 50 MG tablet  LORazepam (ATIVAN) 0.5 MG tablet  mupirocin (BACTROBAN) 2 %  "external ointment  OLANZapine zydis (ZYPREXA) 5 MG ODT  ondansetron (ZOFRAN) 4 MG tablet  ondansetron (ZOFRAN) 4 MG tablet  pantoprazole (PROTONIX) 40 MG EC tablet  polyethylene glycol (MIRALAX) 17 GM/Dose powder  promethazine (PHENERGAN) 12.5 MG tablet  sennosides (SENOKOT) 8.6 MG tablet  traZODone (DESYREL) 50 MG tablet  Vitamin D, Cholecalciferol, 25 MCG (1000 UT) TABS  ARIPiprazole lauroxil ER (ARISTADA) 882 MG/3.2ML intra-muscular      Allergies   Allergen Reactions     Penicillins Rash and Unknown     Family History  Family History   Problem Relation Age of Onset     Diabetes Type 1 Father      Cancer Paternal Grandfather      Social History   Social History     Tobacco Use     Smoking status: Every Day     Packs/day: 1.00     Years: 5.00     Pack years: 5.00     Types: Cigarettes     Smokeless tobacco: Never   Substance Use Topics     Alcohol use: No     Drug use: No      Past medical history, past surgical history, medications, allergies, family history, and social history were reviewed with the patient. No additional pertinent items.      A medically appropriate review of systems was performed with pertinent positives and negatives noted in the HPI, and all other systems negative.    Physical Exam   BP: 112/72  Pulse: 90  Temp: 98.6  F (37  C)  Resp: 16  Height: 162.6 cm (5' 4\")  Weight: 109.3 kg (241 lb)  SpO2: 100 %  Physical Exam  Constitutional:       General: She is not in acute distress.     Appearance: Normal appearance. She is not diaphoretic.   HENT:      Head: Atraumatic.      Mouth/Throat:      Mouth: Mucous membranes are moist.   Eyes:      General: No scleral icterus.     Conjunctiva/sclera: Conjunctivae normal.   Cardiovascular:      Rate and Rhythm: Normal rate.      Heart sounds: Normal heart sounds.   Pulmonary:      Effort: No respiratory distress.      Breath sounds: Normal breath sounds.   Abdominal:      General: Abdomen is flat.   Musculoskeletal:      Cervical back: Neck supple. "   Skin:     General: Skin is warm.      Findings: No rash.   Neurological:      General: No focal deficit present.      Mental Status: She is alert.      Sensory: No sensory deficit.      Motor: No weakness.      Coordination: Coordination normal.   Psychiatric:         Mood and Affect: Mood is anxious and depressed.         Speech: Speech is delayed.         Behavior: Behavior is slowed.         Thought Content: Thought content includes suicidal ideation. Thought content does not include suicidal plan.           ED Course, Procedures, & Data      Procedures           Medications   OLANZapine zydis (zyPREXA) ODT tab 10 mg (10 mg Oral $Given 7/14/23 9812)     Labs Ordered and Resulted from Time of ED Arrival to Time of ED Departure   DRUG ABUSE SCREEN 1 URINE (ED) - Normal       Result Value    Amphetamines Urine Screen Negative      Barbituates Urine Screen Negative      Benzodiazepine Urine Screen Negative      Cannabinoids Urine Screen Negative      Cocaine Urine Screen Negative      Opiates Urine Screen Negative       No orders to display          Critical care was not performed.     Medical Decision Making  The patient's presentation was of moderate complexity (a chronic illness mild to moderate exacerbation, progression, or side effect of treatment).    The patient's evaluation involved:  ordering and/or review of 1 test(s) in this encounter (see separate area of note for details)  discussion of management or test interpretation with another health professional (Patient was assessed by independent provider from [facility] assessment at this time we discussed the possibility of management with outpatient versus hospitalization.)    The patient's management necessitated high risk (a decision regarding hospitalization).      Assessment & Plan        I have reviewed the nursing notes. I have reviewed the findings, diagnosis, plan and need for follow up with the patient.    Patient with history of borderline personality  disorder intellectual disability with current self-injurious behaviors and suicidal thoughts patient required several hours of stabilization here in the emergency room but eventually was voluntarily discharged back to her group home after receiving 10 mg of Zyprexa and stabilizing.  She is not going to be admitted to the inpatient unit will be managed on outpatient basis.    Final diagnoses:   Intellectual disability   Borderline personality disorder (H)   At risk for self-inflicted injury       [facility] EMERGENCY DEPARTMENT  7/14/2023     Robert Olea MD  07/16/23 2737

## 2023-07-14 NOTE — ED NOTES
[facility] order in and we are ready to see this patient.  Dr Max will call us to let us know if this patient will actually need to be seen.
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
## 2023-07-15 ENCOUNTER — HOSPITAL ENCOUNTER (EMERGENCY)
Facility: CLINIC | Age: 23
Discharge: HOME | End: 2023-07-15
Attending: INTERNAL MEDICINE | Admitting: INTERNAL MEDICINE
Payer: MEDICARE

## 2023-07-15 VITALS
RESPIRATION RATE: 16 BRPM | OXYGEN SATURATION: 99 % | HEART RATE: 72 BPM | DIASTOLIC BLOOD PRESSURE: 83 MMHG | SYSTOLIC BLOOD PRESSURE: 123 MMHG | TEMPERATURE: 98.4 F

## 2023-07-15 DIAGNOSIS — F60.3 BORDERLINE PERSONALITY DISORDER (H): ICD-10-CM

## 2023-07-15 DIAGNOSIS — Z72.89 SELF-INJURIOUS BEHAVIOR: ICD-10-CM

## 2023-07-15 PROCEDURE — 90791 PSYCH DIAGNOSTIC EVALUATION: CPT

## 2023-07-15 PROCEDURE — 99284 EMERGENCY DEPT VISIT MOD MDM: CPT | Performed by: INTERNAL MEDICINE

## 2023-07-15 PROCEDURE — 99285 EMERGENCY DEPT VISIT HI MDM: CPT | Mod: 25 | Performed by: INTERNAL MEDICINE

## 2023-07-15 ASSESSMENT — ACTIVITIES OF DAILY LIVING (ADL)
ADLS_ACUITY_SCORE: 37

## 2023-07-15 ASSESSMENT — ENCOUNTER SYMPTOMS
AGITATION: 1
DYSPHORIC MOOD: 1
NERVOUS/ANXIOUS: 1
CHILLS: 1
FEVER: 0
COUGH: 0
ABDOMINAL PAIN: 0
SHORTNESS OF BREATH: 0

## 2023-07-15 NOTE — CONSULTS
Diagnostic Evaluation Consultation  Crisis Assessment    Patient was assessed: In Person  Patient location: declocations: [facility] Adult Emergency Department  Was a release of information signed: No. Reason: release on file      Referral Data and Chief Complaint  Sadaf Ross is a 22 year old, who uses she/her pronouns, and presents to the ED via EMS. Patient is referred to the ED by self. Patient is presenting to the ED for the following concerns: Suicidal ideation.      Informed Consent and Assessment Methods     Patient is her own guardian. Writer met with patient and explained the crisis assessment process, including applicable information disclosures and limits to confidentiality, assessed understanding of the process, and obtained consent to proceed with the assessment. Patient was observed to be able to participate in the assessment as evidenced by participation. Assessment methods included conducting a formal interview with patient, review of medical records, collaboration with medical staff, and obtaining relevant collateral information from family and community providers when available..     Over the course of this crisis assessment provided reassurance, offered validation and engaged patient in problem solving and disposition planning. Patient's response to interventions was positive     Summary of Patient Situation     Patient presents to [facility] ED via EMS for concerns of suicidal and homicidal ideation.  Patient initially was stating she just wanted to die on arrival in the emergency room.  Patient got into an altercation with her roommate at her group home and initially agreed to wait for the crisis worker to arrive, but continued to dysregulated and called an ambulance for herself resulting in the current presentation.  Patient has self-injurious scratches on her left arm that are superficial and did not require stitches or sutures.  Patient states she was confronted by her roommate for  "stealing candy which she denied.  Patient's roommate continued to press her for the theft resulting in the patient refusing to take her medications to make her roommate happy because \"my roommate wants me to be depressed so that is what I will do\".  She additionally states that she called 911 because staff will not help, \"they do not believe me and what I say\".  Patient says she wanted to choke her roommate but admits she feels like that at baseline.  When asked if this time is different than any other time she is presented to the ER she said no, but she is working on coping skills with her therapist Lynne.  She did reemphasize she is not taking her medications because she is depressed for the last week.    Brief Psychosocial History     Patient has been residing in this Group Home for the past 2 years. She likes the staff and other residents and generally gets along with her room mate, although there appears to be some tension currently.   Patient reportedly graduated from , and completed 2 years of college in [employer] at [employer]. Her father passed away in 2019. Patient's mother and siblings live in [place], but they have limited/no contact with each other.   Patient currently does not work.    Significant Clinical History     Medical records indicate a prior mental health diagnosis of Other Unspecified and Specified Bipolar and Related Disorder, Borderline Personality D/O, and Intellectual Disabilities, Unspecified Disability (Intellectual Developmental Disorder.   Patient has significant presentations to [facility] as well as other surrounding facilities over the years and is a regular user of the ED here patient has a robust treatment team in place to address ongoing issues.  Patient has an ED care plan in place due to her increasing use and disruptive behavior in the emergency department when she arrives records show the patient does have an active outpatient team that the patient can utilize for " support, as well as group home staff with 24/7 staffing.  ED care plan notes that is recommended when patient returns to the emergency department should be triaged and if there are not any acute new symptoms, both medically and mental health wise, the group home will be called and informed the patient is returning and medical transport be set up for her return.  If the patient becomes dysregulated, the patient should be offered/given appropriate medications.  This should not hinder her return to her group home.  This note is dated 9/27/2022.  Patient currently meets this criteria and is discharged.     Collateral Information    How long have they been a resident: Some time now, I do not have an exact date.     Why does patient live in the facility: Inability to live on her own in the community.     Significant changes to environment: Nothing significant, no major residents or staff changes.     Legal status (Commitment, probation, guardian, etc.): None.  Patient is her own guardian.     Has the patient made any comments about wanting to kill themselves/others: Yes, patient says this often and is suicidal and self-injurious at baseline.     What happened today: She got angry and frustrated and went out to the street and sat down on the curb and scratched her arm.  The police came and they are the ones who called EMS, although she probably called EMS too.     What is different about patient's functioning: Nothing really, this is her usual everyday stuff.  She gets stuck thinking about a perceived slight against her and she cannot let go of it when she perseverates on it.     Concern about alcohol/drug use: No     If d/c is recommended, can patient return to current living situation: Yes.    If no, what needs to happen in order for patient to return: As long as she is stable.     Risk Assessment  [place] Suicide Severity Rating Scale Full Clinical Version: 7/14/2023  Suicidal Ideation  1. Wish to be Dead (Lifetime):  "Yes  Wish to be Dead Description (Lifetime):  (Suicidal thoughts)  1. Wish to be Dead (Past 1 Month): Yes  Wish to be Dead Description (Past 1 Month):  (I just want to kill myself today)  2. Non-Specific Active Suicidal Thoughts (Lifetime): No  Intensity of Ideation  Most Severe Ideation Rating (Lifetime): 3  Description of Most Severe Ideation (Lifetime):  (\"just thoughts to kill myself\")  Most Severe Ideation Rating (Past 1 Month): 3  Description of Most Severe Ideation (Past 1 Month):  (impulsive thoughts to kill self)  Frequency (Lifetime): Less than once a week  Frequency (Past 1 Month):  (today, I just had ongoing thoughts to kill myself)  Duration (Lifetime): Fleeting, few seconds or minutes  Duration (Past 1 Month): 1-4 hours/a lot of time  Controllability (Lifetime):  (Today, I had ongoing thoughts to \"just kill myself\" since the incident)  Controllability (Past 1 Month): Can control thoughts with little difficulty  Deterrents (Lifetime): Does not apply  Deterrents (Past 1 Month): Deterrents definitely did not stop you  Reasons for Ideation (Lifetime): Equally to get attention, revenge, or a reaction from others and to end/stop the pain  Reasons for Ideation (Past 1 Month): Equally to get attention, revenge, or a reaction from others and to end/stop the pain  Suicidal Behavior  Actual Attempt (Lifetime): Yes  Total Number of Actual Attempts (Lifetime): 1  Actual Attempt Description (Lifetime):  (Cut my neck with a surrated knife)  Actual Attempt (Past 3 Months): No  Has subject engaged in non-suicidal self-injurious behavior? (Lifetime): Yes  Has subject engaged in non-suicidal self-injurious behavior? (Past 3 Months): Yes  Interrupted Attempts (Lifetime): No  Aborted or Self-Interrupted Attempt (Lifetime): Yes  Total Number of Aborted or Self-Interrupted Attempts (Lifetime): 1  Aborted or Self-Interrupted Attempt Description (Lifetime):  (Cut my neck with a surrated knife in 2018/2019)  Aborted or " "Self-Interrupted Attempt (Past 3 Months): No  Preparatory Acts or Behavior (Lifetime): No  C-SSRS Risk (Lifetime/Recent)  Calculated C-SSRS Risk Score (Lifetime/Recent): Moderate Risk     [place] Suicide Severity Rating Scale Since Last Contact:  7/14/2023      Validity of evaluation is impacted by presenting factors during interview: The patient has insisted on coming to the ED despite receiving therapeutic interventions today and not attending individual therapy or not having taken medications for the past week..      Environmental or Psychosocial Events: challenging interpersonal relationships, geographic isolation from supports, impulsivity/recklessness, neither working nor attending school and social isolation  Chronic Risk Factors: chronic and ongoing sleep difficulties, serious, persistent mental illness and history of Non-Suicidal Self Injury (NSSI)   Warning Signs: seeking access to means to hurt or kill self, talking or writing about death, dying, or suicide, hopelessness, rage, anger, seeking revenge, anxiety, agitation, unable to sleep, sleeping all the time, dramatic changes in mood and recent discharges from emergency department or inpatient psychiatric care  Protective Factors: lives in a responsibly safe and stable environment, sense of importance of health and wellness, able to access care without barriers, supportive ongoing medical and mental health care relationships and help seeking  Interpretation of Risk Scoring, Risk Mitigation Interventions and Safety Plan:  Moderate risk indicated. Patient is suicidal at baseline and has had many presentations to the ED with suicidal ideation.  Patient initially presents with suicidal ideation as well as homicidal ideation directed towards her roommate at the group home.  This is not a duty to warn situation.  Patient has been refusing medications to make her roommate happy because she \"she wants me to be depressed\".  Discussed with patient the importance " of taking her medications as prescribed and not letting outside influences determine whether or not she will medicate.  Patient currently does not feel suicidal in assessment or in the emergency room and her ideations have returned to baseline.  Patient is endorsing discharge back to her group home.  Group home was willing for patient to return.  Attending and  agree.  Patient is discharged.          Current Substance Abuse     Is there recent substance abuse? no     Was a urine drug screen or blood alcohol level obtained: No       Mental Status Exam     Affect: Appropriate   Appearance: Appropriate    Attention Span/Concentration: Attentive  Eye Contact: Engaged   Fund of Knowledge: Appropriate    Language /Speech Content: Fluent   Language /Speech Volume: Normal    Language /Speech Rate/Productions: Normal    Recent Memory: Intact   Remote Memory: Intact   Mood: Normal    Orientation to Person: Yes    Orientation to Place: Yes   Orientation to Time of Day: Yes    Orientation to Date: Yes    Situation (Do they understand why they are here?): Yes    Psychomotor Behavior: Normal    Thought Content: Clear   Thought Form: Intact      History of commitment: No       Medication    Psychotropic medications: Yes. Pt is currently taking numerous meds. Medication compliant: No: not taken in past week - refuses. Recent medication changes: Yes see below  Medication changes made in the last two weeks: patient Prozac increased to 80mg from 60mg on 7/10/23       Current Care Team    Primary Care Provider: Dr Jess Wilkins - [facility]   Psychiatrist: Emma Jacobson @ [facility].  Therapist:  Lynne at [facility]   : Lesley @ [phone]    [facility]SS or [facility]: No  ACT Team: No  Other: No      Diagnosis    296.20 (F32.9) Major Depressive Disorder, Single Episode, Unspecified _   300.02 (F41.1) Generalized Anxiety Disorder - by history   F79   Intellectual Disability - mild - by history    Clinical Summary and  "Substantiation of Recommendations    Patient is suicidal at baseline and has had many presentations to the ED with suicidal ideation.  Patient initially presents with suicidal ideation as well as homicidal ideation directed towards her roommate at the group home.  This is not a duty to warn situation.  Patient has been refusing medications to make her roommate happy because she \"she wants me to be depressed\".  Discussed with patient the importance of taking her medications as prescribed and not letting outside influences determine whether or not she will medicate.  Patient currently does not feel suicidal in assessment or in the emergency room and her ideations have returned to baseline.  Patient is endorsing discharge back to her group home.  Group home was willing for patient to return.  Attending and  agree.  Patient is discharged.   Disposition    Recommended disposition: Group Home: Return to placement       Reviewed case and recommendations with attending provider. Attending Name: Dr. Dilshad Bates MD     Attending concurs with disposition: Yes       Patient and/or validated legal guardian concurs with disposition: Yes       Final disposition: Group home: Return to placement .       Outpatient Details (if applicable):   Aftercare plan and appointments placed in the AVS and provided to patient: Yes. Given to patient by RN    Was lethal means counseling provided as a part of aftercare planning? No;       Assessment Details    Patient interview started at: 1120am and completed at: 1150am.     Total time spent with the patient or their family: .50 hrs      CPT code(s) utilized: 57200 - Psychotherapy for Crisis - 60 (30-74*) min       Mario Andrew, Psychotherapist Trainee, Psychotherapist  [facility] - Triage & Transition Services  Callback: [phone]                  "

## 2023-07-15 NOTE — ED TRIAGE NOTES
"\"I shouldn't have come here I just want to die.\" Pt bib ems activated by self after patient reports SI/HI. Pt reportedly agreed to wait for community crisis worker come to house, however then became disregulated, crying and yelling and tried to scratch her L arm with her keys. Very superficial, healing lacerations noted on L forearm. Upon arrival to ED, pt is tearful. She is cooperative with staff direction, she appears in stable condition. Pt placed on 1:1 continuous observation for risk of self-harm. She endorses SI, however contracts for safety inED.      Triage Assessment     Row Name 07/15/23 0937       Triage Assessment (Adult)    Airway WDL WDL       Respiratory WDL    Respiratory WDL WDL       Skin Circulation/Temperature WDL    Skin Circulation/Temperature WDL WDL       Cardiac WDL    Cardiac WDL WDL       Peripheral/Neurovascular WDL    Peripheral Neurovascular WDL WDL              "

## 2023-07-15 NOTE — DISCHARGE INSTRUCTIONS
APPOINTMENTS    Individual therapy: Every Friday at [facility] with Lynne  Weekly sessions with [facility] worker.     Aftercare Plan  If I am feeling unsafe or I am in a crisis, I will:   Contact my established care providers   Call the National Suicide Prevention Lifeline: 988  Go to the nearest emergency room   Call 911     Warning signs that I or other people might notice when a crisis is developing for me: Not sleeping well, feeling restless, being upset, irritated, seeing and hearing things that aren't there, thoughts of wanting to kill myself, thoughts of hurting others.    Things I am able to do on my own to cope or help me feel better:  Take a bath, take a walk, listen to music and watch my favorite movies. Take a break and walk away.     Things that I am able to do with others to cope or help me better: Talk to staff, talk to my therapist about my thoughts. Take deep breaths.     Things I can use or do for distraction:  Journal my thoughts, drawing/Art     Changes I can make to support my mental health and wellness:  Talk to my therapist, and take all prescribed medications.     People in my life that I can ask for help:  My therapist, staff, my [facility] worker.     Your [facility] has a mental health crisis team you can call 24/7: [facility]  [phone]     Other things that are important when I'm in crisis: *Focus on my well-being. Take deep breaths.     Additional resources and information:  Staff at group home.        Crisis Lines  Crisis Text Line  Text 420262  You will be connected with a trained live crisis counselor to provide support.    Por espanol, texto  SIRENA a 112708 o texto a 442-AYUDAME en WhatsApp    [employer] (LGBTQ Youth Crisis Line)  8.774.806.3659  text START to 935-699      Community Resources  Fast Tracker  Linking people to mental health and substance use disorder resources  [employer]ipuin.org     [place] Mental Health Warm Line  Peer to peer support  Monday  "thru Saturday, 12 pm to 10 pm  934.127.5032 or 0.812.481.7782  Text \"Support\" to 36815    [employer] Mental [employer]ness (MAGY)  493.862.2748 or 1.888.MAGY.HELPS      Mental Health Apps  My3  https://Cloudnexapp.org/    VirtualHopeBox  https://Matrimony.com/apps/virtual-hope-box/      Additional Information  Today you were seen by a licensed mental health professional through Triage and Transition services, [facility] ([facility])  for a crisis assessment in the Emergency Department at [facility].  It is recommended that you follow up with your established providers (psychiatrist, mental health therapist, and/or primary care doctor - as relevant) as soon as possible. Coordinators from [facility] will be calling you in the next 24-48 hours to ensure that you have the resources you need.  You can also contact [facility] coordinators directly at [phone]. You may have been scheduled for or offered an appointment with a mental health provider. [facility] maintains an extensive network of licensed behavioral health providers to connect patients with the services they need.  We do not charge providers a fee to participate in our referral network.  We match patients with providers based on a patient's specific needs, insurance coverage, and location.  Our first effort will be to refer you to a provider within your care system, and will utilize providers outside your care system as needed.          "

## 2023-07-15 NOTE — ED PROVIDER NOTES
ED Provider Note  [facility]      History     Chief Complaint   Patient presents with     Suicidal     HPI  Sadaf Ross is a 22 year old female who presents to the ED complaining of suicidal ideation and self cutting. She states she is upset because her roommate at her group home told her to kill herself. She states she scratched her left wrist with her keys this morning and with plastic yesterday. She has a history of borderline personality disorder, bipolar disorder, depression, ADHD, intellectual disability and morbid obesity. She is resident of a group home. She called 911 today and was unwilling to wait for the mobile crisis team to arrive and demanded to be transported to the ED. She has a history of frequent ED visits and has a care plan. She has a history of aggressive behavior toward staff. She denies hallucinations or homicidal thoughts. She hasn't eaten today and doesn't feel hungry. She has been sleeping OK. She has some chills. She denies other physical joselyn issues.     Past Medical History:   Diagnosis Date     ADHD (attention deficit hyperactivity disorder)      Bipolar 1 disorder (H)      Borderline personality disorder (H)      Depression      Depressive disorder      Intellectual disability      Obesity      Syncope        Past Surgical History:   Procedure Laterality Date     APPENDECTOMY       APPENDECTOMY         Family History   Problem Relation Age of Onset     Diabetes Type 1 Father      Cancer Paternal Grandfather        Social History     Tobacco Use     Smoking status: Every Day     Packs/day: 1.00     Years: 5.00     Pack years: 5.00     Types: Cigarettes     Smokeless tobacco: Never   Substance Use Topics     Alcohol use: No       Review of Systems   Constitutional: Positive for chills. Negative for fever.   Respiratory: Negative for cough and shortness of breath.    Cardiovascular: Negative for chest pain.   Gastrointestinal: Negative for abdominal  pain.   Skin: Negative for rash.   Psychiatric/Behavioral: Positive for agitation, dysphoric mood, self-injury and suicidal ideas. The patient is nervous/anxious.    All other systems reviewed and are negative.      Physical Exam   BP: (!) 131/93  Pulse: 98  Temp: 98.4  F (36.9  C)  Resp: 18  SpO2: 97 %  Physical Exam  Vitals and nursing note reviewed.   Constitutional:       General: She is not in acute distress (inititially tearful calmafter a few minutes).     Appearance: She is obese.   HENT:      Head: Normocephalic and atraumatic.      Right Ear: External ear normal.      Left Ear: External ear normal.      Nose: Nose normal.      Mouth/Throat:      Mouth: Mucous membranes are moist.   Eyes:      General: No scleral icterus.     Extraocular Movements: Extraocular movements intact.      Pupils: Pupils are equal, round, and reactive to light.   Cardiovascular:      Rate and Rhythm: Normal rate and regular rhythm.      Heart sounds: No murmur heard.  Pulmonary:      Effort: Pulmonary effort is normal.      Breath sounds: Normal breath sounds. No wheezing or rales.   Abdominal:      Tenderness: There is no abdominal tenderness.   Musculoskeletal:      Comments: About 15 very superficial transverse linear abrasions over left volar wrist.   Neurological:      General: No focal deficit present.      Mental Status: She is alert and oriented to person, place, and time.      Cranial Nerves: No cranial nerve deficit.   Psychiatric:         Attention and Perception: Attention normal.         Mood and Affect: Mood is depressed.         Speech: Speech normal.         Behavior: Behavior is cooperative.         Thought Content: Thought content includes suicidal ideation. Thought content does not include homicidal ideation. Thought content does not include suicidal plan.         Judgment: Judgment is impulsive.           ED Course, Procedures, & Data      Procedures         Mental Health Risk Assessment      PSS-3    Date and  Time Over the past 2 weeks have you felt down, depressed, or hopeless? Over the past 2 weeks have you had thoughts of killing yourself? Have you ever attempted to kill yourself? When did this last happen? User   07/15/23 0941 yes yes yes -- AP              Suicide assessment completed by mental health (D.EKarinaC., LCSW, etc.)       No results found for any visits on 07/15/23.  Medications - No data to display  Labs Ordered and Resulted from Time of ED Arrival to Time of ED Departure - No data to display  No orders to display          Critical care was not performed.     Medical Decision Making  The patient's presentation was of high complexity (a chronic illness severe exacerbation, progression, or side effect of treatment).    The patient's evaluation involved:  history and exam without other MDM data elements    The patient's management necessitated moderate risk (limitations due to social determinants of health (see separate area of note for details)).      Assessment & Plan    Impression:  Young woman with history of borderline personality disorder, bipolar disorder, intellectual disability, history of self injurious behavior and aggressive behavior. She is followed primarily at the [facility] clinic. She has a pattern of frequent ED visits. She has a care plan established. She called 911 today over conflicts with her roommate at the group home which are longstanding. She has been superficially scratching her left wrist with a key today. This is not uncommon for her. She was in the ED an at her primary clinic yesterday. She does not appear to have any new issues today. She was seen in consultation by the DEC  who is familiar with her and feels she is at baseline. She agreed to return to her group home today.    I have reviewed the nursing notes. I have reviewed the findings, diagnosis, plan and need for follow up with the patient.    New Prescriptions    No medications on file       Final diagnoses:    Self-injurious behavior   Borderline personality disorder (H)       Dilshad CANO [facility] EMERGENCY DEPARTMENT  7/15/2023     Dilshad Bates MD  07/15/23 2649

## 2023-07-15 NOTE — ED NOTES
Sadaf Ross  July 14, 2023  Plan of Care Hand-off Note     Patient Care Path: Observation    Plan for Care:     The patient presents to the Emergency Department with disruptive mood, suicidal thoughts, self-harm, thoughts to harm another person, hallucinations and delusions. Following a conflict between the patient and other resident two days ago, the patient perseverated on the incident while making ongoing threats to harm the other resident. She became disruptive, being agitated and threatened to fight the other resident. She reports current suicidal thoughts,  auditory hallucinations telling her to kill herself and delusions seeing dead people. She engaged in self-harm today by cutting herself with a metal object.      Collateral information obtained reports that the other resident accepted the replacement candy, was able to settle the incident. However, the patient continued to be agitated, being disruptive at the group home for the past 2 days, and refused to take her medications this morning. She arrived for scheduled therapy session but left without seeing the therapist. The patient declined re-direction and other therapeutic interventions by group [facility] staff.     While in the ED, the patient continues to perseverating on the incident with the other resident, while responding to staff, being calm and cooperative.     In consultation with Dr Robert Olea, the patient will receive medications, while remaining in Observation status. The patient will be monitored for current symptoms, stabilization and safety.     Next: Following re-evaluation of current symptoms, safety and stabilization, the patient will be discharged to the [facility] @ [phone], and continue with Outpatient treatment including Individual therapy and medication management through [facility] and [facility] services.     Critical Safety Issues: Risk for violent behaviors.    Overview:  This patient is a child/adolescent: No    This  patient has additional special visitor precautions: No    Legal Status: Voluntary    Contacts:   Group Home @ [phone]    Psychiatry Consult:  Psychiatry Consult not requested because No concerns about current medications.     Updated RN regarding plan of care.    ZEESHAN Cotton, LICSW

## 2023-07-15 NOTE — ED NOTES
1550: Sadaf states she is comfortable being discharged back to her group home.     Dilshad Bates MD  07/15/23 155

## 2023-07-15 NOTE — ED NOTES
Bed: ED16C  Expected date: 7/15/23  Expected time: 9:00 AM  Means of arrival: Ambulance  Comments:  [place] 23yo female, suicidal

## 2023-07-15 NOTE — DISCHARGE INSTRUCTIONS
Please take your prescribed medications.  Follow up with your mental joselyn provider at [facility] clinic.  Return to your group home today.

## 2023-07-15 NOTE — ED NOTES
Pt able to verbalize future plans to transfer care providers and follow up at [facility]. She appears in stable condition. Discharge instructions reviewed with her. She verbalized understanding.

## 2023-07-15 NOTE — ED NOTES
Writer spoke to  staff- aware that pt is going back via ambulance. Devyn ( staff) will be waiting for pt.

## 2023-07-15 NOTE — ED NOTES
Pt remained adamant that she is unsafe to go home. She met with DEC , and pt now is awaiting new disposition. She is stable, remains on 1:1. She is tearful intermittently however is responsive to staff redirection.

## 2023-07-15 NOTE — PHARMACY-ADMISSION MEDICATION HISTORY
"Pharmacist Admission Medication History    Admission medication history is complete. The information provided in this note is only as accurate as the sources available at the time of the update.    Medication reconciliation/reorder completed by provider prior to medication history? No    Information Source(s): Patient, Clinic records and CareEverywhere/SureScripts via in-person    Pertinent Information:   1. Patient had a difficult time recalling what medications she is taking.   2. Has not taken medications \"for a few days\"  3. Recalls getting her Aristada IM injection on June 22, 2023  4. Most of the medication list was compiled based on fill history and clinic notes from [facility] (primary care). Per 7/12/2023 [facility] note, her fluoxetine was increased from 60mg to 80mg once daily  5. Was prescribed cephalexin for a skin infection; course 7/11-7/18. Patient unable to recall if she has been taking medication    Changes made to PTA medication list:    Added: fluoxetine, protonix, dicyclomine, famotidine, cephalexin    Deleted: Maalox, bonzonatate, clobestasol, diclofenac, fluticasone NS, Mucinex, hydrocortisone cream, hyoscyamine, ibuprofen, loperamide, loratadine, MVT, omeprazole, prochlorperazine, Bactrim DS, Effexor XR    Changed: None           Allergies reviewed with patient and updates made in EHR: unable to assess    Medication History Completed By: Damaris Raymundo RPH 7/14/2023 7:53 PM    Prior to Admission medications    Medication Sig Last Dose Taking? Auth Provider Long Term End Date   acetaminophen (TYLENOL) 325 MG tablet Take 650 mg by mouth every 6 hours as needed for mild pain More than a month Yes Unknown, Entered By History     albuterol (PROAIR HFA/PROVENTIL HFA/VENTOLIN HFA) 108 (90 Base) MCG/ACT inhaler Inhale 2 puffs into the lungs every 6 hours as needed for shortness of breath, wheezing or cough More than a month Yes Robert Olea MD Yes    bisacodyl (DULCOLAX) 5 MG EC tablet Take 1 " tablet (5 mg) by mouth daily as needed for constipation Unknown Yes Bernardino Schwarz MD     calcium carbonate (TUMS) 500 MG chewable tablet Take 1 chew tab by mouth 2 times daily as needed for heartburn  Unknown Yes Reported, Patient     cephALEXin (KEFLEX) 500 MG capsule Take 500 mg by mouth 3 times daily For cellulitis Past Week Yes Unknown, Entered By History  7/18/23   cyclobenzaprine (FLEXERIL) 10 MG tablet Take 10 mg by mouth 3 times daily as needed for muscle spasms Unknown Yes Unknown, Entered By History     dicyclomine (BENTYL) 20 MG tablet Take 20 mg by mouth 2 times daily as needed (dyspepsia) More than a month Yes Unknown, Entered By History     docusate sodium (COLACE) 50 MG capsule Take 100 mg by mouth 2 times daily Past Week Yes Reported, Patient     famotidine (PEPCID) 20 MG tablet Take 20 mg by mouth daily Past Month Yes Unknown, Entered By History     FLUoxetine (PROZAC) 40 MG capsule Take 80 mg by mouth daily Past Week Yes Unknown, Entered By History No    hydrocortisone, Perianal, (HYDROCORTISONE) 2.5 % cream Place rectally 2 times daily as needed for hemorrhoids Past Week Yes Robert Olea MD     hydrOXYzine (ATARAX) 50 MG tablet Take 50 mg by mouth 2 times daily as needed for anxiety Past Week Yes Reported, Patient     LORazepam (ATIVAN) 0.5 MG tablet Take 0.5 mg by mouth once as needed for anxiety Give as needed any time she has the urge to call 911 or to visit the ER Past Month Yes Unknown, Entered By History     mupirocin (BACTROBAN) 2 % external ointment Apply topically 3 times daily Past Month Yes Ghassan Browning,      OLANZapine zydis (ZYPREXA) 5 MG ODT Take 1 tablet (5 mg) by mouth At Bedtime Past Week Yes Robert Olea MD Yes    ondansetron (ZOFRAN) 4 MG tablet Take 1 tablet (4 mg) by mouth every 8 hours as needed Past Month Yes Tori Whalen MD     ondansetron (ZOFRAN) 4 MG tablet Take 4 mg by mouth every 8 hours as needed for nausea Past  Month Yes Unknown, Entered By History     pantoprazole (PROTONIX) 40 MG EC tablet Take 40 mg by mouth daily Past Week Yes Unknown, Entered By History     polyethylene glycol (MIRALAX) 17 GM/Dose powder Take 17 g by mouth daily Past Month Yes Reported, Patient No    promethazine (PHENERGAN) 12.5 MG tablet Take 12.5 mg by mouth every 4 hours Past Month Yes Reported, Patient     sennosides (SENOKOT) 8.6 MG tablet Take 1 tablet by mouth 2 times daily as needed for constipation Past Week Yes Unknown, Entered By History     traZODone (DESYREL) 50 MG tablet Take 100 mg by mouth At Bedtime Past Week Yes Unknown, Entered By History     Vitamin D, Cholecalciferol, 25 MCG (1000 UT) TABS Take 1,000 Units by mouth daily  Past Week Yes Reported, Patient     ARIPiprazole lauroxil ER (ARISTADA) 882 MG/3.2ML intra-muscular Inject 882 mg into the muscle every 28 days On the 22nd of each month 6/22/2023  Unknown, Entered By History

## 2023-07-16 ENCOUNTER — HOSPITAL ENCOUNTER (EMERGENCY)
Facility: CLINIC | Age: 23
Discharge: NURSING, REHAB OR LONG-TERM CARE | End: 2023-07-16
Attending: EMERGENCY MEDICINE | Admitting: EMERGENCY MEDICINE
Payer: MEDICARE

## 2023-07-16 VITALS
TEMPERATURE: 99.3 F | DIASTOLIC BLOOD PRESSURE: 97 MMHG | OXYGEN SATURATION: 100 % | RESPIRATION RATE: 16 BRPM | HEART RATE: 81 BPM | SYSTOLIC BLOOD PRESSURE: 146 MMHG

## 2023-07-16 DIAGNOSIS — R45.89 THOUGHTS OF SELF HARM: ICD-10-CM

## 2023-07-16 PROCEDURE — 99283 EMERGENCY DEPT VISIT LOW MDM: CPT | Performed by: EMERGENCY MEDICINE

## 2023-07-16 PROCEDURE — 99285 EMERGENCY DEPT VISIT HI MDM: CPT | Performed by: EMERGENCY MEDICINE

## 2023-07-16 ASSESSMENT — ACTIVITIES OF DAILY LIVING (ADL): ADLS_ACUITY_SCORE: 37

## 2023-07-16 NOTE — ED TRIAGE NOTES
Triage Assessment     Row Name 07/16/23 4907       Triage Assessment (Adult)    Airway WDL WDL       Respiratory WDL    Respiratory WDL WDL       Skin Circulation/Temperature WDL    Skin Circulation/Temperature WDL WDL       Cardiac WDL    Cardiac WDL WDL       Cognitive/Neuro/Behavioral WDL    Cognitive/Neuro/Behavioral WDL mood/behavior    Mood/Behavior calm;cooperative

## 2023-07-16 NOTE — DISCHARGE INSTRUCTIONS
Please utilize your mental health resources if you feel as though you are in crisis.  They can refer you to the ER as needed.

## 2023-07-16 NOTE — ED PROVIDER NOTES
ED Provider Note  [facility]      History     Chief Complaint   Patient presents with     Suicidal     Roommate told her to go kill herself, patient made very superficial cuts on her left inner forearm with her keys.     HPI  Sadaf Ross is a 22 year old female with past medical history significant for borderline personality disorder, bipolar affective disorder, and suicidal ideation who presents to our ED for the 3rd day in a row seeking evaluation of suicidal ideation. Patient reports that her roommate at her group home told her to kill herself, and so she cut her forearm superficially with her keys.    Patient was seen here yesterday for that.  She reports that her roommate again told her to go kill herself and she had thoughts to cut herself with her key but she did not so she came to the ER for repeat evaluation.  No other attempts at self-harm since she was seen yesterday.  No other complaints or concerns.    Patient is well-known to this ER.  She is in a group home that is highly supervised.  Patient has 24/7 staffing she has an extensive history of self-harm and self injures behaviors and does not have access to dangerous objects/medications etc.  She currently has plastic keys.  Patient has a care plan recommending that if she is at her mental health baseline she be transferred back to the group home.      Past Medical History  Past Medical History:   Diagnosis Date     ADHD (attention deficit hyperactivity disorder)      Bipolar 1 disorder (H)      Borderline personality disorder (H)      Depression      Depressive disorder      Intellectual disability      Obesity      Syncope      Past Surgical History:   Procedure Laterality Date     APPENDECTOMY       APPENDECTOMY       acetaminophen (TYLENOL) 325 MG tablet  albuterol (PROAIR HFA/PROVENTIL HFA/VENTOLIN HFA) 108 (90 Base) MCG/ACT inhaler  ARIPiprazole lauroxil ER (ARISTADA) 882 MG/3.2ML intra-muscular  bisacodyl  (DULCOLAX) 5 MG EC tablet  calcium carbonate (TUMS) 500 MG chewable tablet  cephALEXin (KEFLEX) 500 MG capsule  cyclobenzaprine (FLEXERIL) 10 MG tablet  dicyclomine (BENTYL) 20 MG tablet  docusate sodium (COLACE) 50 MG capsule  famotidine (PEPCID) 20 MG tablet  FLUoxetine (PROZAC) 40 MG capsule  hydrocortisone, Perianal, (HYDROCORTISONE) 2.5 % cream  hydrOXYzine (ATARAX) 50 MG tablet  LORazepam (ATIVAN) 0.5 MG tablet  mupirocin (BACTROBAN) 2 % external ointment  OLANZapine zydis (ZYPREXA) 5 MG ODT  ondansetron (ZOFRAN) 4 MG tablet  ondansetron (ZOFRAN) 4 MG tablet  pantoprazole (PROTONIX) 40 MG EC tablet  polyethylene glycol (MIRALAX) 17 GM/Dose powder  promethazine (PHENERGAN) 12.5 MG tablet  sennosides (SENOKOT) 8.6 MG tablet  traZODone (DESYREL) 50 MG tablet  Vitamin D, Cholecalciferol, 25 MCG (1000 UT) TABS      Allergies   Allergen Reactions     Penicillins Rash and Unknown     Family History  Family History   Problem Relation Age of Onset     Diabetes Type 1 Father      Cancer Paternal Grandfather      Social History   Social History     Tobacco Use     Smoking status: Every Day     Packs/day: 1.00     Years: 5.00     Pack years: 5.00     Types: Cigarettes     Smokeless tobacco: Never   Substance Use Topics     Alcohol use: No     Drug use: No      Past medical history, past surgical history, medications, allergies, family history, and social history were reviewed with the patient. No additional pertinent items.      A medically appropriate review of systems was performed with pertinent positives and negatives noted in the HPI, and all other systems negative.    Physical Exam   BP: 123/85  Pulse: 105  Temp: 99.3  F (37.4  C)  Resp: 16  SpO2: 97 %  Physical Exam  General: Awake alert no acute distress   lungs: Breathing comfortably on room air   Cardiovascular: Regular rate and rhythm without murmur  Extremities: Patient has superficial cuts to the left upper extremity, none on the right.  No signs of symptoms  of infection, no redness warmth or drainage.  Psych: Patient is calm and cooperative does not appear to be responding to internal stimuli.  She does state that she self injured yesterday though not today.    ED Course, Procedures, & Data      Procedures       Mental health assessment: Patient appears to be around her baseline.  Did not appear that she needed new mental health assessment.  Per her care plan plan is to expedite disposition if patient is at baseline       No results found for any visits on 07/16/23.  Medications - No data to display  Labs Ordered and Resulted from Time of ED Arrival to Time of ED Departure - No data to display  No orders to display          Critical care was not performed.     Medical Decision Making  The patient's presentation was of moderate complexity (a chronic illness mild to moderate exacerbation, progression, or side effect of treatment).    The patient's evaluation involved:  review of external note(s) from 2 sources (see separate area of note for details)    The patient's management necessitated only low risk treatment.      Assessment & Plan    Sadaf is a 22-year-old female who appears to be at her mental health baseline.  She endorses thoughts of hurting herself though has not actively cut today.  Patient is a very supervised, safe environment for her chronic self injures behavior.  Patient has no acute complaints at this time.  No new medical concerns, no new mental health concerns.  Patient will be dispositioned back to her group home per her emergency care plan.        I have reviewed the nursing notes. I have reviewed the findings, diagnosis, plan and need for follow up with the patient.    New Prescriptions    No medications on file       Final diagnoses:   Thoughts of self harm   ISergio, am serving as a trained medical scribe to document services personally performed by Bernardino Begum MD, based on the provider's statements to me.     IBernardino MD,  was physically present and have reviewed and verified the accuracy of this note documented by Sergio Sargent.      Bernardino Schwarz MD  [facility] EMERGENCY DEPARTMENT  7/16/2023     Bernardino Schwarz MD  07/16/23 1537

## 2023-07-16 NOTE — ED NOTES
Pt's staff at the group home Arlene has been made aware that pt is on her way back via ambulance.  Pt left in stable condition, all belongings with pt upon discharge

## 2023-07-16 NOTE — PROGRESS NOTES
A DEC assessment was not ordered, but this writer coordinated efforts to have patient returned to her group home where she has 24/7 supervision and does not have access to dangerous items. This writer called Arlene, supervisor from [facility] that recommended that patient should be returned and that she is not being bullied by other residents, but that she is the instigator.  Arlene stated that patient receives constant supervision and can be safe to return via ambulance.  Arlene reported that patient's care team is trying to acquire a guardian for patient since she needs more restrictions such as phone limitations since she constantly calls the crisis team and they send the EMS.    Patient is well-known to this facility and she has an extensive history of self-harm and self injures behaviors and does not have access to dangerous objects/medications etc.  She currently has plastic keys.  Patient has a care plan recommending that if she is at her mental health baseline she be transferred back to the group home which is documented in Epic.  Selena Zavala, [facility] Bay Medical CenterSW

## 2023-07-16 NOTE — ED NOTES
Bed: [facility]  Expected date: 7/16/23  Expected time: 2:10 PM  Means of arrival: Ambulance  Comments:  [place] [age]yo female, suicidal, cooperative

## 2023-07-16 NOTE — ED NOTES
Bed: [facility]  Expected date:   Expected time:   Means of arrival:   Comments:  Harry GRECO

## 2023-07-29 ENCOUNTER — HOSPITAL ENCOUNTER (EMERGENCY)
Facility: CLINIC | Age: 23
Discharge: HOME | End: 2023-07-30
Attending: FAMILY MEDICINE | Admitting: FAMILY MEDICINE
Payer: MEDICARE

## 2023-07-29 DIAGNOSIS — K52.9 GASTROENTERITIS: ICD-10-CM

## 2023-07-29 LAB
ALBUMIN SERPL BCG-MCNC: 3.5 G/DL (ref 3.5–5.2)
ALBUMIN UR-MCNC: NEGATIVE MG/DL
ALP SERPL-CCNC: 57 U/L (ref 35–104)
ALT SERPL W P-5'-P-CCNC: 10 U/L (ref 0–50)
ANION GAP SERPL CALCULATED.3IONS-SCNC: 9 MMOL/L (ref 7–15)
APPEARANCE UR: CLEAR
AST SERPL W P-5'-P-CCNC: 12 U/L (ref 0–45)
BACTERIA #/AREA URNS HPF: ABNORMAL /HPF
BASOPHILS # BLD AUTO: 0.1 10E3/UL (ref 0–0.2)
BASOPHILS NFR BLD AUTO: 1 %
BILIRUB SERPL-MCNC: <0.2 MG/DL
BILIRUB UR QL STRIP: NEGATIVE
BUN SERPL-MCNC: 10.4 MG/DL (ref 6–20)
CALCIUM SERPL-MCNC: 6.9 MG/DL (ref 8.6–10)
CHLORIDE SERPL-SCNC: 114 MMOL/L (ref 98–107)
COLOR UR AUTO: ABNORMAL
CREAT SERPL-MCNC: 0.57 MG/DL (ref 0.51–0.95)
DEPRECATED HCO3 PLAS-SCNC: 18 MMOL/L (ref 22–29)
EOSINOPHIL # BLD AUTO: 0.3 10E3/UL (ref 0–0.7)
EOSINOPHIL NFR BLD AUTO: 3 %
ERYTHROCYTE [DISTWIDTH] IN BLOOD BY AUTOMATED COUNT: 12.7 % (ref 10–15)
GFR SERPL CREATININE-BSD FRML MDRD: >90 ML/MIN/1.73M2
GLUCOSE SERPL-MCNC: 94 MG/DL (ref 70–99)
GLUCOSE UR STRIP-MCNC: NEGATIVE MG/DL
HCG UR QL: NEGATIVE
HCT VFR BLD AUTO: 36.3 % (ref 35–47)
HGB BLD-MCNC: 12.6 G/DL (ref 11.7–15.7)
HGB UR QL STRIP: ABNORMAL
IMM GRANULOCYTES # BLD: 0.1 10E3/UL
IMM GRANULOCYTES NFR BLD: 1 %
KETONES UR STRIP-MCNC: NEGATIVE MG/DL
LEUKOCYTE ESTERASE UR QL STRIP: NEGATIVE
LIPASE SERPL-CCNC: 34 U/L (ref 13–60)
LYMPHOCYTES # BLD AUTO: 2.8 10E3/UL (ref 0.8–5.3)
LYMPHOCYTES NFR BLD AUTO: 27 %
MCH RBC QN AUTO: 28.9 PG (ref 26.5–33)
MCHC RBC AUTO-ENTMCNC: 34.7 G/DL (ref 31.5–36.5)
MCV RBC AUTO: 83 FL (ref 78–100)
MONOCYTES # BLD AUTO: 0.6 10E3/UL (ref 0–1.3)
MONOCYTES NFR BLD AUTO: 6 %
MUCOUS THREADS #/AREA URNS LPF: PRESENT /LPF
NEUTROPHILS # BLD AUTO: 6.7 10E3/UL (ref 1.6–8.3)
NEUTROPHILS NFR BLD AUTO: 62 %
NITRATE UR QL: NEGATIVE
NRBC # BLD AUTO: 0 10E3/UL
NRBC BLD AUTO-RTO: 0 /100
PH UR STRIP: 6 [PH] (ref 5–7)
PLATELET # BLD AUTO: 219 10E3/UL (ref 150–450)
POTASSIUM SERPL-SCNC: 3 MMOL/L (ref 3.4–5.3)
PROT SERPL-MCNC: 5.5 G/DL (ref 6.4–8.3)
RBC # BLD AUTO: 4.36 10E6/UL (ref 3.8–5.2)
RBC URINE: 0 /HPF
SODIUM SERPL-SCNC: 141 MMOL/L (ref 136–145)
SP GR UR STRIP: 1.01 (ref 1–1.03)
SQUAMOUS EPITHELIAL: 4 /HPF
UROBILINOGEN UR STRIP-MCNC: NORMAL MG/DL
WBC # BLD AUTO: 10.5 10E3/UL (ref 4–11)
WBC URINE: <1 /HPF

## 2023-07-29 PROCEDURE — 258N000003 HC RX IP 258 OP 636: Performed by: FAMILY MEDICINE

## 2023-07-29 PROCEDURE — 81025 URINE PREGNANCY TEST: CPT | Performed by: FAMILY MEDICINE

## 2023-07-29 PROCEDURE — 96374 THER/PROPH/DIAG INJ IV PUSH: CPT | Performed by: FAMILY MEDICINE

## 2023-07-29 PROCEDURE — 85014 HEMATOCRIT: CPT | Performed by: FAMILY MEDICINE

## 2023-07-29 PROCEDURE — 96361 HYDRATE IV INFUSION ADD-ON: CPT | Performed by: FAMILY MEDICINE

## 2023-07-29 PROCEDURE — 83690 ASSAY OF LIPASE: CPT | Performed by: FAMILY MEDICINE

## 2023-07-29 PROCEDURE — 36415 COLL VENOUS BLD VENIPUNCTURE: CPT | Performed by: FAMILY MEDICINE

## 2023-07-29 PROCEDURE — 99284 EMERGENCY DEPT VISIT MOD MDM: CPT | Performed by: FAMILY MEDICINE

## 2023-07-29 PROCEDURE — 250N000011 HC RX IP 250 OP 636: Mod: JZ | Performed by: FAMILY MEDICINE

## 2023-07-29 PROCEDURE — 80053 COMPREHEN METABOLIC PANEL: CPT | Performed by: FAMILY MEDICINE

## 2023-07-29 PROCEDURE — 81001 URINALYSIS AUTO W/SCOPE: CPT | Performed by: FAMILY MEDICINE

## 2023-07-29 PROCEDURE — 99284 EMERGENCY DEPT VISIT MOD MDM: CPT | Mod: 25 | Performed by: FAMILY MEDICINE

## 2023-07-29 RX ORDER — ONDANSETRON 4 MG/1
4 TABLET, ORALLY DISINTEGRATING ORAL EVERY 8 HOURS PRN
Qty: 10 TABLET | Refills: 0 | Status: SHIPPED | OUTPATIENT
Start: 2023-07-29 | End: 2023-08-01

## 2023-07-29 RX ORDER — POTASSIUM CHLORIDE 1.5 G/1.58G
40 POWDER, FOR SOLUTION ORAL ONCE
Status: COMPLETED | OUTPATIENT
Start: 2023-07-29 | End: 2023-07-30

## 2023-07-29 RX ORDER — ONDANSETRON 2 MG/ML
4 INJECTION INTRAMUSCULAR; INTRAVENOUS ONCE
Status: COMPLETED | OUTPATIENT
Start: 2023-07-29 | End: 2023-07-29

## 2023-07-29 RX ADMIN — SODIUM CHLORIDE 1000 ML: 9 INJECTION, SOLUTION INTRAVENOUS at 22:06

## 2023-07-29 RX ADMIN — ONDANSETRON 4 MG: 2 INJECTION INTRAMUSCULAR; INTRAVENOUS at 22:06

## 2023-07-29 ASSESSMENT — ACTIVITIES OF DAILY LIVING (ADL): ADLS_ACUITY_SCORE: 37

## 2023-07-30 VITALS
WEIGHT: 252 LBS | HEART RATE: 69 BPM | DIASTOLIC BLOOD PRESSURE: 69 MMHG | BODY MASS INDEX: 43.02 KG/M2 | OXYGEN SATURATION: 98 % | TEMPERATURE: 98 F | RESPIRATION RATE: 16 BRPM | SYSTOLIC BLOOD PRESSURE: 110 MMHG | HEIGHT: 64 IN

## 2023-07-30 ASSESSMENT — ACTIVITIES OF DAILY LIVING (ADL)
ADLS_ACUITY_SCORE: 37
ADLS_ACUITY_SCORE: 37

## 2023-07-30 NOTE — ED PROVIDER NOTES
"    [facility] EMERGENCY DEPARTMENT ([facility])    7/29/23      ED PROVIDER NOTE        History     Chief Complaint   Patient presents with    Abdominal Pain     Patient presents via EMS. Patient reports vomiting, constipation, abdominal pain, chest pain for the past 2 days. Patient currently has 20 G in LAC. . Patient denies pregnancy. Patient reports 8/10 pain.     HPI  Sadaf Ross is a 22 year old female with a past medical history significant for ADHD, bipolar 1 disorder,, BPD, depression and intellectual disability who presents to the emergency department for evaluation of increased abdominal discomfort nausea and vomiting patient states that she has had several episodes of emesis this afternoon not sure if she \"ate something bad\".  Patient denies any marked increase in suicidal or homicidal thoughts no thoughts of self injury at this time is here primarily for medical concerns not her chronic mental health concerns.    Per chart review, patient has a care plan in place.     Past Medical History  Past Medical History:   Diagnosis Date    ADHD (attention deficit hyperactivity disorder)     Bipolar 1 disorder (H)     Borderline personality disorder (H)     Depression     Depressive disorder     Intellectual disability     Obesity     Syncope      Past Surgical History:   Procedure Laterality Date    APPENDECTOMY      APPENDECTOMY       ondansetron (ZOFRAN ODT) 4 MG ODT tab  acetaminophen (TYLENOL) 325 MG tablet  albuterol (PROAIR HFA/PROVENTIL HFA/VENTOLIN HFA) 108 (90 Base) MCG/ACT inhaler  ARIPiprazole lauroxil ER (ARISTADA) 882 MG/3.2ML intra-muscular  bisacodyl (DULCOLAX) 5 MG EC tablet  calcium carbonate (TUMS) 500 MG chewable tablet  cyclobenzaprine (FLEXERIL) 10 MG tablet  dicyclomine (BENTYL) 20 MG tablet  docusate sodium (COLACE) 50 MG capsule  famotidine (PEPCID) 20 MG tablet  FLUoxetine (PROZAC) 40 MG capsule  hydrocortisone, Perianal, (HYDROCORTISONE) 2.5 % cream  hydrOXYzine " "(ATARAX) 50 MG tablet  LORazepam (ATIVAN) 0.5 MG tablet  mupirocin (BACTROBAN) 2 % external ointment  OLANZapine zydis (ZYPREXA) 5 MG ODT  ondansetron (ZOFRAN) 4 MG tablet  ondansetron (ZOFRAN) 4 MG tablet  pantoprazole (PROTONIX) 40 MG EC tablet  polyethylene glycol (MIRALAX) 17 GM/Dose powder  promethazine (PHENERGAN) 12.5 MG tablet  sennosides (SENOKOT) 8.6 MG tablet  traZODone (DESYREL) 50 MG tablet  Vitamin D, Cholecalciferol, 25 MCG (1000 UT) TABS      Allergies   Allergen Reactions    Penicillins Rash and Unknown     Family History  Family History   Problem Relation Age of Onset    Diabetes Type 1 Father     Cancer Paternal Grandfather      Social History   Social History     Tobacco Use    Smoking status: Every Day     Packs/day: 1.00     Years: 5.00     Pack years: 5.00     Types: Cigarettes    Smokeless tobacco: Never   Substance Use Topics    Alcohol use: No    Drug use: No         A medically appropriate review of systems was performed with pertinent positives and negatives noted in the HPI, and all other systems negative.    Physical Exam   BP: 113/74  Pulse: 102  Temp: 97.8  F (36.6  C)  Resp: 16  Height: 162.6 cm (5' 4\")  Weight: 114.3 kg (252 lb)  SpO2: 98 %  Physical Exam  Constitutional:       General: She is not in acute distress.     Appearance: Normal appearance. She is not diaphoretic.   HENT:      Head: Atraumatic.      Mouth/Throat:      Mouth: Mucous membranes are moist.   Eyes:      General: No scleral icterus.     Conjunctiva/sclera: Conjunctivae normal.   Cardiovascular:      Rate and Rhythm: Normal rate.      Heart sounds: Normal heart sounds.   Pulmonary:      Effort: No respiratory distress.      Breath sounds: Normal breath sounds.   Abdominal:      General: Abdomen is flat.      Tenderness: There is generalized abdominal tenderness. There is no guarding or rebound.   Musculoskeletal:      Cervical back: Neck supple.   Skin:     General: Skin is warm.      Findings: No rash. "   Neurological:      Mental Status: She is alert.           ED Course, Procedures, & Data      Procedures       Results for orders placed or performed during the hospital encounter of 07/29/23   Comprehensive metabolic panel     Status: Abnormal   Result Value Ref Range    Sodium 141 136 - 145 mmol/L    Potassium 3.0 (L) 3.4 - 5.3 mmol/L    Chloride 114 (H) 98 - 107 mmol/L    Carbon Dioxide (CO2) 18 (L) 22 - 29 mmol/L    Anion Gap 9 7 - 15 mmol/L    Urea Nitrogen 10.4 6.0 - 20.0 mg/dL    Creatinine 0.57 0.51 - 0.95 mg/dL    Calcium 6.9 (L) 8.6 - 10.0 mg/dL    Glucose 94 70 - 99 mg/dL    Alkaline Phosphatase 57 35 - 104 U/L    AST 12 0 - 45 U/L    ALT 10 0 - 50 U/L    Protein Total 5.5 (L) 6.4 - 8.3 g/dL    Albumin 3.5 3.5 - 5.2 g/dL    Bilirubin Total <0.2 <=1.2 mg/dL    GFR Estimate >90 >60 mL/min/1.73m2   Lipase     Status: Normal   Result Value Ref Range    Lipase 34 13 - 60 U/L   UA with Microscopic reflex to Culture     Status: Abnormal    Specimen: Urine, Midstream   Result Value Ref Range    Color Urine Straw Colorless, Straw, Light Yellow, Yellow    Appearance Urine Clear Clear    Glucose Urine Negative Negative mg/dL    Bilirubin Urine Negative Negative    Ketones Urine Negative Negative mg/dL    Specific Gravity Urine 1.014 1.003 - 1.035    Blood Urine Trace (A) Negative    pH Urine 6.0 5.0 - 7.0    Protein Albumin Urine Negative Negative mg/dL    Urobilinogen Urine Normal Normal, 2.0 mg/dL    Nitrite Urine Negative Negative    Leukocyte Esterase Urine Negative Negative    Bacteria Urine Few (A) None Seen /HPF    Mucus Urine Present (A) None Seen /LPF    RBC Urine 0 <=2 /HPF    WBC Urine <1 <=5 /HPF    Squamous Epithelials Urine 4 (H) <=1 /HPF    Narrative    Urine Culture not indicated   HCG qualitative urine (UPT)     Status: Normal   Result Value Ref Range    hCG Urine Qualitative Negative Negative   CBC with platelets and differential     Status: None   Result Value Ref Range    WBC Count 10.5 4.0 -  11.0 10e3/uL    RBC Count 4.36 3.80 - 5.20 10e6/uL    Hemoglobin 12.6 11.7 - 15.7 g/dL    Hematocrit 36.3 35.0 - 47.0 %    MCV 83 78 - 100 fL    MCH 28.9 26.5 - 33.0 pg    MCHC 34.7 31.5 - 36.5 g/dL    RDW 12.7 10.0 - 15.0 %    Platelet Count 219 150 - 450 10e3/uL    % Neutrophils 62 %    % Lymphocytes 27 %    % Monocytes 6 %    % Eosinophils 3 %    % Basophils 1 %    % Immature Granulocytes 1 %    NRBCs per 100 WBC 0 <1 /100    Absolute Neutrophils 6.7 1.6 - 8.3 10e3/uL    Absolute Lymphocytes 2.8 0.8 - 5.3 10e3/uL    Absolute Monocytes 0.6 0.0 - 1.3 10e3/uL    Absolute Eosinophils 0.3 0.0 - 0.7 10e3/uL    Absolute Basophils 0.1 0.0 - 0.2 10e3/uL    Absolute Immature Granulocytes 0.1 <=0.4 10e3/uL    Absolute NRBCs 0.0 10e3/uL   CBC with platelets differential     Status: None    Narrative    The following orders were created for panel order CBC with platelets differential.  Procedure                               Abnormality         Status                     ---------                               -----------         ------                     CBC with platelets and d...[[id number]]                      Final result                 Please view results for these tests on the individual orders.     Medications   0.9% sodium chloride BOLUS (0 mLs Intravenous Stopped 7/30/23 0013)   ondansetron (ZOFRAN) injection 4 mg (4 mg Intravenous $Given 7/29/23 2206)   potassium chloride (KLOR-CON) Packet 40 mEq (40 mEq Oral Not Given 7/30/23 0013)     Labs Ordered and Resulted from Time of ED Arrival to Time of ED Departure   COMPREHENSIVE METABOLIC PANEL - Abnormal       Result Value    Sodium 141      Potassium 3.0 (*)     Chloride 114 (*)     Carbon Dioxide (CO2) 18 (*)     Anion Gap 9      Urea Nitrogen 10.4      Creatinine 0.57      Calcium 6.9 (*)     Glucose 94      Alkaline Phosphatase 57      AST 12      ALT 10      Protein Total 5.5 (*)     Albumin 3.5      Bilirubin Total <0.2      GFR Estimate >90     ROUTINE UA  WITH MICROSCOPIC REFLEX TO CULTURE - Abnormal    Color Urine Straw      Appearance Urine Clear      Glucose Urine Negative      Bilirubin Urine Negative      Ketones Urine Negative      Specific Gravity Urine 1.014      Blood Urine Trace (*)     pH Urine 6.0      Protein Albumin Urine Negative      Urobilinogen Urine Normal      Nitrite Urine Negative      Leukocyte Esterase Urine Negative      Bacteria Urine Few (*)     Mucus Urine Present (*)     RBC Urine 0      WBC Urine <1      Squamous Epithelials Urine 4 (*)    LIPASE - Normal    Lipase 34     HCG QUALITATIVE URINE - Normal    hCG Urine Qualitative Negative     CBC WITH PLATELETS AND DIFFERENTIAL    WBC Count 10.5      RBC Count 4.36      Hemoglobin 12.6      Hematocrit 36.3      MCV 83      MCH 28.9      MCHC 34.7      RDW 12.7      Platelet Count 219      % Neutrophils 62      % Lymphocytes 27      % Monocytes 6      % Eosinophils 3      % Basophils 1      % Immature Granulocytes 1      NRBCs per 100 WBC 0      Absolute Neutrophils 6.7      Absolute Lymphocytes 2.8      Absolute Monocytes 0.6      Absolute Eosinophils 0.3      Absolute Basophils 0.1      Absolute Immature Granulocytes 0.1      Absolute NRBCs 0.0       No orders to display          Critical care was not performed.     Medical Decision Making  The patient's presentation was of moderate complexity (an acute illness with systemic symptoms).    The patient's evaluation involved:  ordering and/or review of 3+ test(s) in this encounter (see separate area of note for details)    The patient's management necessitated moderate risk (prescription drug management including medications given in the ED).    Assessment & Plan        I have reviewed the nursing notes. I have reviewed the findings, diagnosis, plan and need for follow up with the patient.    Discharge Medication List as of 7/30/2023  2:41 AM        START taking these medications    Details   ondansetron (ZOFRAN ODT) 4 MG ODT tab Take 1  tablet (4 mg) by mouth every 8 hours as needed for nausea, Disp-10 tablet, R-0, Local Print             Final diagnoses:   Gastroenteritis         [facility] EMERGENCY DEPARTMENT  7/29/2023     Robert Olea MD  07/31/23 0719

## 2023-07-30 NOTE — ED NOTES
Patient reports eating food 2-3 days ago and has been having nausea and vomiting. Patient reports that she could have possibly ate some bad food. Patient reports 9/10 medial sharp abdominal pain.

## 2023-07-30 NOTE — ED TRIAGE NOTES
Patient presents via EMS. Patient reports vomiting, constipation, abdominal pain, chest pain for the past 2 days. Patient currently has 20 G in LAC. . Patient denies pregnancy. Patient reports 8/10 pain.      Triage Assessment       Row Name 07/29/23 2116       Triage Assessment (Adult)    Airway WDL WDL       Respiratory WDL    Respiratory WDL WDL       Skin Circulation/Temperature WDL    Skin Circulation/Temperature WDL WDL       Cardiac WDL    Cardiac WDL X;chest pain       Chest Pain Assessment    Chest Pain Location midsternal    Character sharp    Precipitating Factors at rest    Alleviating Factors nothing    Chest Pain Intervention 12-lead ECG obtained       Peripheral/Neurovascular WDL    Peripheral Neurovascular WDL WDL       Cognitive/Neuro/Behavioral WDL    Cognitive/Neuro/Behavioral WDL WDL

## 2023-08-01 ENCOUNTER — HOSPITAL ENCOUNTER (EMERGENCY)
Facility: CLINIC | Age: 23
Discharge: HOME | End: 2023-08-01
Attending: EMERGENCY MEDICINE | Admitting: EMERGENCY MEDICINE
Payer: MEDICARE

## 2023-08-01 VITALS
DIASTOLIC BLOOD PRESSURE: 77 MMHG | HEART RATE: 102 BPM | OXYGEN SATURATION: 98 % | SYSTOLIC BLOOD PRESSURE: 110 MMHG | TEMPERATURE: 98.9 F | RESPIRATION RATE: 16 BRPM

## 2023-08-01 DIAGNOSIS — K92.1 BLOOD IN STOOL: ICD-10-CM

## 2023-08-01 DIAGNOSIS — K59.00 CONSTIPATION, UNSPECIFIED CONSTIPATION TYPE: ICD-10-CM

## 2023-08-01 PROCEDURE — 99282 EMERGENCY DEPT VISIT SF MDM: CPT | Performed by: EMERGENCY MEDICINE

## 2023-08-01 PROCEDURE — 99283 EMERGENCY DEPT VISIT LOW MDM: CPT | Performed by: EMERGENCY MEDICINE

## 2023-08-01 ASSESSMENT — ACTIVITIES OF DAILY LIVING (ADL): ADLS_ACUITY_SCORE: 37

## 2023-08-01 NOTE — ED NOTES
Bed: UREDH-I  Expected date:   Expected time:   Means of arrival:   Comments:  [address] 22 F Rectal bleed. ETA 15 mins

## 2023-08-01 NOTE — ED TRIAGE NOTES
Pt comes in via EMS for rectal bleeding. States when she woke up this morning there was blood when she wiped and her rectum hurt.       Triage Assessment       Row Name 08/01/23 6084       Triage Assessment (Adult)    Airway WDL WDL       Respiratory WDL    Respiratory WDL WDL       Cardiac WDL    Cardiac WDL WDL       Peripheral/Neurovascular WDL    Peripheral Neurovascular WDL WDL       Cognitive/Neuro/Behavioral WDL    Cognitive/Neuro/Behavioral WDL WDL

## 2023-08-01 NOTE — ED PROVIDER NOTES
"ED Provider Note  [facility]      History     Chief Complaint   Patient presents with    Rectal Bleeding     Parker bleeding during bowel movement, feels very bloated.     HPI    Sadaf Ross is a 22 year old female who is well-known to the emergency department for multiple visits for behavioral issues who presents to the ED today due to rectal bleeding.  Patient lives in a group home and has a history of bipolar disorder, borderline personality disorder, ADHD, intellectual disability.  Today she reports that at about 3 PM, approximately 4 hours prior to arrival, she had a hard bowel movement and noted that she had red blood on the toilet paper, on top of the stool, and in the toilet bowl water.  She reports she has been constipated for \"a long time\".  She is not able to tell me how long this has been going on for.  She denies any nausea or vomiting to me.  No diarrhea.  She does report that in the past she has had hemorrhoids.  She states that she is taking \"Pepto-Bismol\" and Colace for her constipation.  She does have MiraLAX on her med list but reports she has not been taking it recently.  She reports rectal burning when she passes stool.  She does have some low abdominal cramping which started today.  Denies any fevers or chills, slight nasal congestion at night but this is chronic.  No cough, shortness of breath, or chest pain.  She does not have any history of inflammatory bowel disease.    Past Medical History:   Diagnosis Date    ADHD (attention deficit hyperactivity disorder)     Bipolar 1 disorder (H)     Borderline personality disorder (H)     Depression     Depressive disorder     Intellectual disability     Obesity     Syncope        Past Surgical History:   Procedure Laterality Date    APPENDECTOMY      APPENDECTOMY         Family History   Problem Relation Age of Onset    Diabetes Type 1 Father     Cancer Paternal Grandfather        Social History     Tobacco Use    " Smoking status: Every Day     Packs/day: 1.00     Years: 5.00     Pack years: 5.00     Types: Cigarettes    Smokeless tobacco: Never   Substance Use Topics    Alcohol use: No         Past Medical History  Past Medical History:   Diagnosis Date    ADHD (attention deficit hyperactivity disorder)     Bipolar 1 disorder (H)     Borderline personality disorder (H)     Depression     Depressive disorder     Intellectual disability     Obesity     Syncope      Past Surgical History:   Procedure Laterality Date    APPENDECTOMY      APPENDECTOMY       acetaminophen (TYLENOL) 325 MG tablet  albuterol (PROAIR HFA/PROVENTIL HFA/VENTOLIN HFA) 108 (90 Base) MCG/ACT inhaler  ARIPiprazole lauroxil ER (ARISTADA) 882 MG/3.2ML intra-muscular  bisacodyl (DULCOLAX) 5 MG EC tablet  calcium carbonate (TUMS) 500 MG chewable tablet  cyclobenzaprine (FLEXERIL) 10 MG tablet  dicyclomine (BENTYL) 20 MG tablet  docusate sodium (COLACE) 50 MG capsule  famotidine (PEPCID) 20 MG tablet  FLUoxetine (PROZAC) 40 MG capsule  hydrocortisone, Perianal, (HYDROCORTISONE) 2.5 % cream  hydrOXYzine (ATARAX) 50 MG tablet  LORazepam (ATIVAN) 0.5 MG tablet  mupirocin (BACTROBAN) 2 % external ointment  OLANZapine zydis (ZYPREXA) 5 MG ODT  ondansetron (ZOFRAN ODT) 4 MG ODT tab  ondansetron (ZOFRAN) 4 MG tablet  ondansetron (ZOFRAN) 4 MG tablet  pantoprazole (PROTONIX) 40 MG EC tablet  polyethylene glycol (MIRALAX) 17 GM/Dose powder  promethazine (PHENERGAN) 12.5 MG tablet  sennosides (SENOKOT) 8.6 MG tablet  traZODone (DESYREL) 50 MG tablet  Vitamin D, Cholecalciferol, 25 MCG (1000 UT) TABS      Allergies   Allergen Reactions    Penicillins Rash and Unknown     Family History  Family History   Problem Relation Age of Onset    Diabetes Type 1 Father     Cancer Paternal Grandfather      Social History   Social History     Tobacco Use    Smoking status: Every Day     Packs/day: 1.00     Years: 5.00     Pack years: 5.00     Types: Cigarettes    Smokeless tobacco:  Never   Substance Use Topics    Alcohol use: No    Drug use: No         A medically appropriate review of systems was performed with pertinent positives and negatives noted in the HPI, and all other systems negative.    Physical Exam   BP: 110/77  Pulse: 102  Temp: 98.9  F (37.2  C)  Resp: 16  SpO2: 98 %  Physical Exam  Vitals and nursing note reviewed.   Constitutional:       General: She is not in acute distress.     Appearance: She is not diaphoretic.      Comments: Cooperative adult female, sitting in a chair, no acute distress   HENT:      Head: Atraumatic.      Mouth/Throat:      Mouth: Mucous membranes are moist.      Pharynx: Oropharynx is clear. No oropharyngeal exudate.   Eyes:      General: No scleral icterus.     Pupils: Pupils are equal, round, and reactive to light.   Cardiovascular:      Rate and Rhythm: Normal rate.      Pulses: Normal pulses.      Heart sounds: Normal heart sounds. No murmur heard.  Pulmonary:      Effort: No respiratory distress.      Breath sounds: Normal breath sounds.   Abdominal:      General: Bowel sounds are normal.      Palpations: Abdomen is soft.      Tenderness: There is no abdominal tenderness.   Genitourinary:     Comments: Rectal exam: No external hemorrhoids noted.  Digital rectal exam was performed with no evidence of masses, no blood on gloved finger.  Guaiac negative.  Musculoskeletal:         General: No tenderness.   Skin:     General: Skin is warm.      Findings: No rash.   Neurological:      Mental Status: She is alert.      Coordination: Abnormal coordination: history.   Psychiatric:      Comments: Reactive affect.  Cooperative.  Not agitated or aggressive.           ED Course, Procedures, & Data      Procedures            No results found for any visits on 08/01/23.  Medications - No data to display  Labs Ordered and Resulted from Time of ED Arrival to Time of ED Departure - No data to display  No orders to display          Critical care was not performed.      Medical Decision Making  The patient's presentation was of low complexity (an acute and uncomplicated illness or injury).    The patient's evaluation involved:  review of external note(s) from 2 sources (see separate area of note for details)  review of 1 test result(s) ordered prior to this encounter (see separate area of note for details)    The patient's management necessitated only low risk treatment.    Assessment & Plan    Patient presents to the emergency department today for the above complaints.  Based on history I strongly suspect hemorrhoidal bleeding.  Exam shows no evidence of external hemorrhoids, though it is still possible that she could have internal hemorrhoids.  Initially she did agree to having a CBC checked, however ultimately she declined.  3 days ago on July 29 she had a CBC with a normal hemoglobin of 12.6.    I have advised her to take the MiraLAX which is on her med list but she has not been taking this.  This will help her keep her stool soft.  If she is continuing to notice blood in the stool she needs to follow-up with her primary clinic.  Patient verbalizes understanding.    I have reviewed the nursing notes. I have reviewed the findings, diagnosis, plan and need for follow up with the patient.    New Prescriptions    No medications on file       Final diagnoses:   Blood in stool   Constipation, unspecified constipation type       Deepa Pelaez MD    [facility] EMERGENCY DEPARTMENT  8/1/2023     Deepa Pelaez MD  08/01/23 1946

## 2023-08-01 NOTE — ED NOTES
"Writer was assigned as Pt's 1:1; upon making contact with Pt, she stated, \"I am in so much pain... my bottom,\" gesturing at her backside. She indicated that she has been observing blood in her stool and upon wiping after bowel movements. Per Pt, she has been constipated and vomiting for 3 days, but today at roughly 1500 was the first time she observed blood in her stool. She described the blood as \"bright, cherry red\" and that it became more noticeable after wiping.  "

## 2023-08-01 NOTE — ED TRIAGE NOTES
Pt comes in via EMS for rectal bleeding. States when she woke up this morning there was blood when she wiped and her rectum hurt.

## 2023-08-02 NOTE — DISCHARGE INSTRUCTIONS
You have been seen in the emergency department today for an episode of blood in your stool.  You have reported that this occurs in the context of straining to have a hard bowel movement.  It is common with constipation to sometimes have some bleeding due to straining and hemorrhoids.  You have declined having us check a hemoglobin here in the emergency department, however you did have a hemoglobin checked a few days ago which was normal.    We recommend that you use MiraLAX which you already do have a prescription for.  You should use this 1-2 times per day to keep your stool soft.  If you continue to notice blood in your stool, you need to follow-up with your primary clinic.

## 2023-08-04 ENCOUNTER — HOSPITAL ENCOUNTER (EMERGENCY)
Facility: CLINIC | Age: 23
Discharge: HOME | End: 2023-08-04
Attending: EMERGENCY MEDICINE | Admitting: EMERGENCY MEDICINE
Payer: MEDICARE

## 2023-08-04 VITALS
SYSTOLIC BLOOD PRESSURE: 126 MMHG | RESPIRATION RATE: 16 BRPM | OXYGEN SATURATION: 97 % | HEART RATE: 84 BPM | TEMPERATURE: 98.6 F | DIASTOLIC BLOOD PRESSURE: 84 MMHG

## 2023-08-04 DIAGNOSIS — R51.9 NONINTRACTABLE HEADACHE, UNSPECIFIED CHRONICITY PATTERN, UNSPECIFIED HEADACHE TYPE: ICD-10-CM

## 2023-08-04 PROCEDURE — 250N000013 HC RX MED GY IP 250 OP 250 PS 637: Performed by: EMERGENCY MEDICINE

## 2023-08-04 PROCEDURE — 99283 EMERGENCY DEPT VISIT LOW MDM: CPT | Performed by: EMERGENCY MEDICINE

## 2023-08-04 RX ORDER — OLANZAPINE 10 MG/1
10 TABLET, ORALLY DISINTEGRATING ORAL ONCE
Status: COMPLETED | OUTPATIENT
Start: 2023-08-04 | End: 2023-08-04

## 2023-08-04 RX ADMIN — OLANZAPINE 10 MG: 10 TABLET, ORALLY DISINTEGRATING ORAL at 22:10

## 2023-08-04 ASSESSMENT — ACTIVITIES OF DAILY LIVING (ADL)
ADLS_ACUITY_SCORE: 37
ADLS_ACUITY_SCORE: 37

## 2023-08-05 ENCOUNTER — HOSPITAL ENCOUNTER (EMERGENCY)
Facility: CLINIC | Age: 23
Discharge: HOME | End: 2023-08-05
Attending: EMERGENCY MEDICINE | Admitting: EMERGENCY MEDICINE
Payer: MEDICARE

## 2023-08-05 VITALS
OXYGEN SATURATION: 100 % | DIASTOLIC BLOOD PRESSURE: 85 MMHG | HEART RATE: 85 BPM | SYSTOLIC BLOOD PRESSURE: 137 MMHG | TEMPERATURE: 97.5 F | RESPIRATION RATE: 18 BRPM

## 2023-08-05 DIAGNOSIS — R51.9 NONINTRACTABLE HEADACHE, UNSPECIFIED CHRONICITY PATTERN, UNSPECIFIED HEADACHE TYPE: ICD-10-CM

## 2023-08-05 LAB
ATRIAL RATE - MUSE: 91 BPM
DIASTOLIC BLOOD PRESSURE - MUSE: NORMAL MMHG
INTERPRETATION ECG - MUSE: NORMAL
P AXIS - MUSE: 90 DEGREES
PR INTERVAL - MUSE: 176 MS
QRS DURATION - MUSE: 84 MS
QT - MUSE: 362 MS
QTC - MUSE: 445 MS
R AXIS - MUSE: 88 DEGREES
SYSTOLIC BLOOD PRESSURE - MUSE: NORMAL MMHG
T AXIS - MUSE: 89 DEGREES
VENTRICULAR RATE- MUSE: 91 BPM

## 2023-08-05 PROCEDURE — 96375 TX/PRO/DX INJ NEW DRUG ADDON: CPT | Performed by: EMERGENCY MEDICINE

## 2023-08-05 PROCEDURE — 258N000003 HC RX IP 258 OP 636: Performed by: EMERGENCY MEDICINE

## 2023-08-05 PROCEDURE — 99284 EMERGENCY DEPT VISIT MOD MDM: CPT | Mod: 25 | Performed by: EMERGENCY MEDICINE

## 2023-08-05 PROCEDURE — 96361 HYDRATE IV INFUSION ADD-ON: CPT | Performed by: EMERGENCY MEDICINE

## 2023-08-05 PROCEDURE — 250N000011 HC RX IP 250 OP 636: Mod: JZ | Performed by: EMERGENCY MEDICINE

## 2023-08-05 PROCEDURE — 96374 THER/PROPH/DIAG INJ IV PUSH: CPT | Performed by: EMERGENCY MEDICINE

## 2023-08-05 PROCEDURE — 99283 EMERGENCY DEPT VISIT LOW MDM: CPT | Performed by: EMERGENCY MEDICINE

## 2023-08-05 RX ORDER — KETOROLAC TROMETHAMINE 15 MG/ML
15 INJECTION, SOLUTION INTRAMUSCULAR; INTRAVENOUS ONCE
Status: COMPLETED | OUTPATIENT
Start: 2023-08-05 | End: 2023-08-05

## 2023-08-05 RX ORDER — DROPERIDOL 2.5 MG/ML
0.62 INJECTION, SOLUTION INTRAMUSCULAR; INTRAVENOUS ONCE
Status: COMPLETED | OUTPATIENT
Start: 2023-08-05 | End: 2023-08-05

## 2023-08-05 RX ADMIN — KETOROLAC TROMETHAMINE 15 MG: 15 INJECTION, SOLUTION INTRAMUSCULAR; INTRAVENOUS at 16:31

## 2023-08-05 RX ADMIN — DROPERIDOL 0.62 MG: 2.5 INJECTION, SOLUTION INTRAMUSCULAR; INTRAVENOUS at 16:31

## 2023-08-05 RX ADMIN — SODIUM CHLORIDE 1000 ML: 9 INJECTION, SOLUTION INTRAVENOUS at 16:30

## 2023-08-05 ASSESSMENT — ACTIVITIES OF DAILY LIVING (ADL)
ADLS_ACUITY_SCORE: 37
ADLS_ACUITY_SCORE: 37

## 2023-08-05 NOTE — ED NOTES
Pt resting calmly, denies acute complaint. Endorses vague SI states this is not different than her baseline, MD aware determined no need for 1:1 at thist corinna. Pt appears in stable condition. Reports mild relief from HA. She is calm, cooperative, in behavioral control. She appears in stable condition. Pt is visualized from nurses station. Safety is maintained.

## 2023-08-05 NOTE — ED PROVIDER NOTES
[facility] [place] EMERGENCY DEPARTMENT ([facility])    8/04/23      ED PROVIDER NOTE  UREDH-G      History     Chief Complaint   Patient presents with    Headache     Since yesterday      HPI    Sadaf Ross is a 22 year old female with a care plan in place; disruptive in ED, with a past medical history significant for ADHD, bipolar 1, BPD, depression, intellectual disability, and SI from her group home who presents to the emergency department for evaluation of headache.  Patient is very well-known to this emergency department for multiple repeated visits.  She does reside in a group home.  Patient reports that she has had a headache for the past 2 to 3 days.  She has been using Tylenol and ibuprofen every 8 hours without improvement.  She endorses pain in the bifrontal region which is throbbing in  nature.  Patient denies any head trauma or head injury.  She endorses some nausea but no vomiting.  No fevers or chills, no nasal congestion or sore throat.  No visual changes.  No photophobia.  No abdominal pain.  No urinary symptoms.  Patient was seen by urgent care today for this exact complaint.  She received IV fluids as well as Toradol and Zofran in urgent care.    Past Medical History  Past Medical History:   Diagnosis Date    ADHD (attention deficit hyperactivity disorder)     Bipolar 1 disorder (H)     Borderline personality disorder (H)     Depression     Depressive disorder     Intellectual disability     Obesity     Syncope      Past Surgical History:   Procedure Laterality Date    APPENDECTOMY      APPENDECTOMY       acetaminophen (TYLENOL) 325 MG tablet  albuterol (PROAIR HFA/PROVENTIL HFA/VENTOLIN HFA) 108 (90 Base) MCG/ACT inhaler  ARIPiprazole lauroxil ER (ARISTADA) 882 MG/3.2ML intra-muscular  bisacodyl (DULCOLAX) 5 MG EC tablet  calcium carbonate (TUMS) 500 MG chewable tablet  cyclobenzaprine (FLEXERIL) 10 MG tablet  dicyclomine (BENTYL) 20 MG tablet  docusate sodium (COLACE) 50 MG  capsule  famotidine (PEPCID) 20 MG tablet  FLUoxetine (PROZAC) 40 MG capsule  hydrocortisone, Perianal, (HYDROCORTISONE) 2.5 % cream  hydrOXYzine (ATARAX) 50 MG tablet  LORazepam (ATIVAN) 0.5 MG tablet  mupirocin (BACTROBAN) 2 % external ointment  OLANZapine zydis (ZYPREXA) 5 MG ODT  ondansetron (ZOFRAN) 4 MG tablet  ondansetron (ZOFRAN) 4 MG tablet  pantoprazole (PROTONIX) 40 MG EC tablet  polyethylene glycol (MIRALAX) 17 GM/Dose powder  promethazine (PHENERGAN) 12.5 MG tablet  sennosides (SENOKOT) 8.6 MG tablet  traZODone (DESYREL) 50 MG tablet  Vitamin D, Cholecalciferol, 25 MCG (1000 UT) TABS      Allergies   Allergen Reactions    Penicillins Rash and Unknown     Family History  Family History   Problem Relation Age of Onset    Diabetes Type 1 Father     Cancer Paternal Grandfather      Social History   Social History     Tobacco Use    Smoking status: Every Day     Packs/day: 1.00     Years: 5.00     Pack years: 5.00     Types: Cigarettes    Smokeless tobacco: Never   Substance Use Topics    Alcohol use: No    Drug use: No      Past medical history, past surgical history, medications, allergies, family history, and social history were reviewed with the patient. No additional pertinent items.      A medically appropriate review of systems was performed with pertinent positives and negatives noted in the HPI, and all other systems negative.    Physical Exam   BP: 124/86  Pulse: 85  Temp: 98.6  F (37  C)  Resp: 16  SpO2: 100 %  Physical Exam  Vitals and nursing note reviewed.   Constitutional:       General: She is not in acute distress.     Appearance: She is not diaphoretic.      Comments: Adult female, alert, cooperative, no acute distress   HENT:      Head: Normocephalic and atraumatic.      Nose: Nose normal.      Mouth/Throat:      Mouth: Mucous membranes are moist.      Pharynx: Oropharynx is clear. No oropharyngeal exudate.   Eyes:      General: No scleral icterus.     Pupils: Pupils are equal, round, and  reactive to light.   Neck:      Comments: Normal range of motion, no meningeal signs  Cardiovascular:      Rate and Rhythm: Normal rate.      Pulses: Normal pulses.      Heart sounds: Normal heart sounds. No murmur heard.  Pulmonary:      Effort: No respiratory distress.      Breath sounds: Normal breath sounds.   Abdominal:      General: Bowel sounds are normal.      Palpations: Abdomen is soft.      Tenderness: There is no abdominal tenderness.      Comments: Obese, soft, nontender to palpation   Musculoskeletal:         General: No tenderness.   Skin:     General: Skin is warm.      Findings: No rash.   Neurological:      General: No focal deficit present.      Mental Status: She is alert.      GCS: GCS eye subscore is 4. GCS verbal subscore is 5. GCS motor subscore is 6.      Cranial Nerves: Cranial nerves 2-12 are intact.      Sensory: Sensation is intact.      Motor: Motor function is intact.      Coordination: Coordination is intact.         ED Course, Procedures, & Data      Procedures                  No results found for any visits on 08/04/23.  Medications   OLANZapine zydis (zyPREXA) ODT tab 10 mg (10 mg Oral $Given 8/4/23 2210)     Labs Ordered and Resulted from Time of ED Arrival to Time of ED Departure - No data to display  No orders to display          Critical care was not performed.     Medical Decision Making  The patient's presentation was of low complexity (an acute and uncomplicated illness or injury).    The patient's evaluation involved:  review of external note(s) from 1 sources (see separate area of note for details)    The patient's management necessitated moderate risk (prescription drug management including medications given in the ED).    Assessment & Plan      This is a 22-year-old who is extraordinarily well-known to the emergency department who presents today for headache.  On my exam she has a normal neuro exam, she is afebrile, and neck exam is also normal.  I am not worried about  meningitis or subarachnoid hemorrhage.  She appears at her baseline, I have evaluated this patient on many occasions in the past.  I do not believe any neuroimaging is necessary.  I offered her Zyprexa for control of headache and nausea.  She accepted.  She was given 10 mg of oral Zyprexa and upon recheck, she states she wants to go home.  Patient will be discharged to her group home at this time.    This part of the medical record was transcribed by Mu Kang Medical Scribe, from a dictation done by Deepa Pelaez MD.       I have reviewed the nursing notes. I have reviewed the findings, diagnosis, plan and need for follow up with the patient.    Discharge Medication List as of 8/4/2023 11:12 PM          Final diagnoses:   Nonintractable headache, unspecified chronicity pattern, unspecified headache type       I, Mu Kang, am serving as a trained medical scribe to document services personally performed by Deepa Pelaez MD based on the provider's statements to me on August 4, 2023.  This document has been checked and approved by the attending provider.    I, Deepa Pelaez MD, was physically present and have reviewed and verified the accuracy of this note documented by xander Salvador scribe.      Deepa Pelaez MD  [facility] EMERGENCY DEPARTMENT  August 4, 2023       Deepa Pelaez MD  08/05/23 0026

## 2023-08-05 NOTE — DISCHARGE INSTRUCTIONS
TODAY'S VISIT:  You were seen today for headache  -   - If you had any labs or imaging/radiology tests performed today, you should also discuss these tests with your usual provider.     FOLLOW-UP:  Please make an appointment to follow up with:  - Your Primary Care Provider. If you do not have a PCP, please call the Primary Care Center (phone: [phone] for an appointment    - Have your provider review the results from today's visit with you again to make sure no further follow-up or additional testing is needed based on those results.     RETURN TO THE EMERGENCY DEPARTMENT  Return to the Emergency Department at any time for any new or worsening symptoms or any concerns.

## 2023-08-05 NOTE — ED TRIAGE NOTES
Triage Assessment       Row Name 08/05/23 155       Triage Assessment (Adult)    Airway WDL WDL       Respiratory WDL    Respiratory WDL WDL       Skin Circulation/Temperature WDL    Skin Circulation/Temperature WDL WDL       Cardiac WDL    Cardiac WDL WDL       Peripheral/Neurovascular WDL    Peripheral Neurovascular WDL WDL       Cognitive/Neuro/Behavioral WDL    Cognitive/Neuro/Behavioral WDL WDL

## 2023-08-05 NOTE — DISCHARGE INSTRUCTIONS
You have been seen in the emergency department today for your symptoms.  Please follow-up with your primary clinic for any ongoing issues with regard to headaches.

## 2023-08-05 NOTE — ED NOTES
Pt discharged home at this time, ambulatory. Pt provided w/ printed and verbal discharge instructions at this time. Pt verbalized understanding of instructions at this time. Pt stable at time of discharge.

## 2023-08-05 NOTE — ED TRIAGE NOTES
Patient has a headache, was here yesterday with same complaint. Patient is vitally stable. Comes from .

## 2023-08-05 NOTE — ED PROVIDER NOTES
History     Chief Complaint   Patient presents with    Headache     Patient was here yesterday with same complaint      HPI  Sadaf Ross is a 22 year old female with a past medical history of borderline personality disorder, ADHD, depression, ductal disability who presents to the emergency department with a chief complaint of headache.  The patient does have an ER care plan in place and frequently presents to the emergency department.  The patient resides in a group home currently.  The patient was seen here yesterday for the same complaint, at which point she had reported that her headache has been present for the last 2 to 3 days.  Patient has been using Tylenol/ibuprofen at home.  Headache is located in the front of her head and is throbbing in nature.  No recent head trauma.  Patient complained of nausea without vomiting.  Afebrile yesterday.  The patient was treated with Toradol and Zofran as well as IV fluids at urgent care yesterday before presenting to the emergency department.  She was then treated with Zyprexa in the emergency department, after which the patient reportedly requested to go home.  However, the patient states that she does not feel that her headache got better after these medications yesterday.    I have reviewed the Medications, Allergies, Past Medical and Surgical History, and Social History in the [employer] system.    Past Medical History:   Diagnosis Date    ADHD (attention deficit hyperactivity disorder)     Bipolar 1 disorder (H)     Borderline personality disorder (H)     Depression     Depressive disorder     Intellectual disability     Obesity     Syncope      Past Surgical History:   Procedure Laterality Date    APPENDECTOMY      APPENDECTOMY       No current facility-administered medications for this encounter.     Current Outpatient Medications   Medication    acetaminophen (TYLENOL) 325 MG tablet    albuterol (PROAIR HFA/PROVENTIL HFA/VENTOLIN HFA) 108 (90 Base) MCG/ACT  inhaler    ARIPiprazole lauroxil ER (ARISTADA) 882 MG/3.2ML intra-muscular    bisacodyl (DULCOLAX) 5 MG EC tablet    calcium carbonate (TUMS) 500 MG chewable tablet    cyclobenzaprine (FLEXERIL) 10 MG tablet    dicyclomine (BENTYL) 20 MG tablet    docusate sodium (COLACE) 50 MG capsule    famotidine (PEPCID) 20 MG tablet    FLUoxetine (PROZAC) 40 MG capsule    hydrocortisone, Perianal, (HYDROCORTISONE) 2.5 % cream    hydrOXYzine (ATARAX) 50 MG tablet    LORazepam (ATIVAN) 0.5 MG tablet    mupirocin (BACTROBAN) 2 % external ointment    OLANZapine zydis (ZYPREXA) 5 MG ODT    ondansetron (ZOFRAN) 4 MG tablet    ondansetron (ZOFRAN) 4 MG tablet    pantoprazole (PROTONIX) 40 MG EC tablet    polyethylene glycol (MIRALAX) 17 GM/Dose powder    promethazine (PHENERGAN) 12.5 MG tablet    sennosides (SENOKOT) 8.6 MG tablet    traZODone (DESYREL) 50 MG tablet    Vitamin D, Cholecalciferol, 25 MCG (1000 UT) TABS     Allergies   Allergen Reactions    Penicillins Rash and Unknown     Past medical history, past surgical history, medications, and allergies were reviewed with the patient. Additional pertinent items: None    Social History     Socioeconomic History    Marital status: Single     Spouse name: Not on file    Number of children: Not on file    Years of education: Not on file    Highest education level: Not on file   Occupational History    Not on file   Tobacco Use    Smoking status: Every Day     Packs/day: 1.00     Years: 5.00     Pack years: 5.00     Types: Cigarettes    Smokeless tobacco: Never   Substance and Sexual Activity    Alcohol use: No    Drug use: No    Sexual activity: Not Currently     Partners: Female, Male     Birth control/protection: Pill, Injection   Other Topics Concern    Not on file   Social History Narrative    Not on file     Social Determinants of Health     Financial Resource Strain: Not on file   Food Insecurity: Not on file   Transportation Needs: Not on file   Physical Activity: Not on file    Stress: Not on file   Social Connections: Not on file   Intimate Partner Violence: Not on file   Housing Stability: Not on file     Social history was reviewed with the patient. Additional pertinent items: None    Review of Systems  A medically appropriate review of systems was performed with pertinent positives and negatives noted in the HPI, and all other systems negative.    Physical Exam   BP: 137/85  Pulse: 105  Temp: 97.5  F (36.4  C)  Resp: 18  SpO2: 100 %      General: Well nourished, well developed, NAD  HEENT: EOMI, anicteric. NCAT, MMM  Neck: no jugular venous distension, supple, nl ROM  Cardiac: Regular rate and rhythm. No murmurs, rubs, or gallops. Normal S1, S2.  Intact peripheral pulses  Pulm: CTAB, no stridor, wheezes, rales, rhonchi  Skin: Warm and dry to the touch.  No rash  Extremities: No LE edema, no cyanosis, w/w/p  Neuro: Alert and oriented x 4, no facial droop, CN II-XII intact, moving all 4 extremities, sensation intact throughout, steady gait, no nystagmus, coordination normal as tested with finger-to-nose and rapid alternating movements.  No pronator drift.  PERRL, EOMI.  Speech is clear without aphasia or dysarthria.      ED Course        Procedures              EKG Interpretation:      Interpreted by Tori Whalen MD  Time reviewed: 1621  Symptoms at time of EKG: Headache  Rhythm: normal sinus   Rate: normal, 91 bpm  Axis: NORMAL  Ectopy: none  Conduction: normal  ST Segments/ T Waves: No ST-T wave changes  Q Waves: none  Comparison to prior: Unchanged from 12/29/2022    Clinical Impression: normal EKG                 Labs Ordered and Resulted from Time of ED Arrival to Time of ED Departure - No data to display         No results found for this or any previous visit (from the past 24 hour(s)).    Labs, vital signs, and imaging studies were reviewed by me.    Medications   0.9% sodium chloride BOLUS (has no administration in time range)   ketorolac (TORADOL) injection 15 mg (has  no administration in time range)   droPERidol (INAPSINE) injection 0.625 mg (has no administration in time range)       Assessments & Plan (with Medical Decision Making)   Sadaf Ross is a 22 year old female who presents to the emergency department with headache.  Differential diagnosis includes tension headache, migraine, viral syndrome.  Low concern for ICH or meningitis as neck is supple on exam, patient afebrile.  No recent trauma.  Medications ordered for symptomatic relief in the emergency department.    Critical care was not performed.     Medical Decision Making  The patient's presentation was of low complexity (an acute and uncomplicated illness or injury).    The patient's evaluation involved:  review of external note(s) from 1 sources (see separate area of note for details)    The patient's management necessitated moderate risk (prescription drug management including medications given in the ED).    I have reviewed the nursing notes.    I have reviewed the findings, diagnosis, plan and need for follow up with the patient.    Patient to be discharged home when symptoms improved. Advised to follow up with PCP within 1 week. To return to ER immediately with any new/worsening symptoms. Plan of care discussed with patient who expresses understanding and agrees with plan of care.    New Prescriptions    No medications on file       Final diagnoses:   Nonintractable headache, unspecified chronicity pattern, unspecified headache type       SERA WHALEN MD  8/5/2023   [facility] EMERGENCY DEPARTMENT       Sera Whalen MD  08/05/23 3381

## 2023-08-05 NOTE — ED NOTES
Bed: ED08  Expected date: 8/5/23  Expected time: 3:45 PM  Means of arrival:   Comments:  Fadi 727, 22y F, Headache

## 2023-08-05 NOTE — ED TRIAGE NOTES
Patient reports she has headaches that started yesterday. Patient [facility] and [facility] and medication. Patient reports the migraine medication makes her sick and unable to sleep. Patient reports her headache gets worse when she stretches.       Patient is in good spirits, bringing up being in love and seeing her nephew.

## 2023-08-05 NOTE — ED TRIAGE NOTES
Triage Assessment       Row Name 08/04/23 1955       Triage Assessment (Adult)    Airway WDL WDL       Respiratory WDL    Respiratory WDL WDL       Skin Circulation/Temperature WDL    Skin Circulation/Temperature WDL WDL       Cardiac WDL    Cardiac WDL WDL       Peripheral/Neurovascular WDL    Peripheral Neurovascular WDL WDL       Cognitive/Neuro/Behavioral WDL    Cognitive/Neuro/Behavioral WDL WDL

## 2023-08-06 ENCOUNTER — APPOINTMENT (OUTPATIENT)
Dept: CT IMAGING | Facility: CLINIC | Age: 23
End: 2023-08-06
Attending: EMERGENCY MEDICINE
Payer: MEDICARE

## 2023-08-06 ENCOUNTER — HOSPITAL ENCOUNTER (EMERGENCY)
Facility: CLINIC | Age: 23
Discharge: HOME | End: 2023-08-06
Attending: EMERGENCY MEDICINE | Admitting: EMERGENCY MEDICINE
Payer: MEDICARE

## 2023-08-06 ENCOUNTER — HOSPITAL ENCOUNTER (EMERGENCY)
Facility: CLINIC | Age: 23
Discharge: NURSING, REHAB OR LONG-TERM CARE | End: 2023-08-06
Attending: EMERGENCY MEDICINE | Admitting: EMERGENCY MEDICINE
Payer: MEDICARE

## 2023-08-06 ENCOUNTER — NURSE TRIAGE (OUTPATIENT)
Dept: NURSING | Facility: CLINIC | Age: 23
End: 2023-08-06

## 2023-08-06 VITALS
SYSTOLIC BLOOD PRESSURE: 107 MMHG | HEART RATE: 91 BPM | TEMPERATURE: 99 F | RESPIRATION RATE: 16 BRPM | OXYGEN SATURATION: 97 % | DIASTOLIC BLOOD PRESSURE: 70 MMHG

## 2023-08-06 VITALS
DIASTOLIC BLOOD PRESSURE: 76 MMHG | HEART RATE: 90 BPM | SYSTOLIC BLOOD PRESSURE: 144 MMHG | TEMPERATURE: 99.6 F | RESPIRATION RATE: 18 BRPM

## 2023-08-06 DIAGNOSIS — R51.9 HEADACHE DISORDER: ICD-10-CM

## 2023-08-06 DIAGNOSIS — R45.851 SUICIDAL IDEATION: Primary | ICD-10-CM

## 2023-08-06 DIAGNOSIS — R51.9 NONINTRACTABLE EPISODIC HEADACHE, UNSPECIFIED HEADACHE TYPE: ICD-10-CM

## 2023-08-06 LAB
ANION GAP SERPL CALCULATED.3IONS-SCNC: 13 MMOL/L (ref 7–15)
ATRIAL RATE - MUSE: 78 BPM
BASOPHILS # BLD AUTO: 0 10E3/UL (ref 0–0.2)
BASOPHILS NFR BLD AUTO: 1 %
BUN SERPL-MCNC: 8.1 MG/DL (ref 6–20)
CALCIUM SERPL-MCNC: 9.3 MG/DL (ref 8.6–10)
CHLORIDE SERPL-SCNC: 107 MMOL/L (ref 98–107)
CREAT SERPL-MCNC: 0.84 MG/DL (ref 0.51–0.95)
DEPRECATED HCO3 PLAS-SCNC: 21 MMOL/L (ref 22–29)
DIASTOLIC BLOOD PRESSURE - MUSE: NORMAL MMHG
EOSINOPHIL # BLD AUTO: 0.1 10E3/UL (ref 0–0.7)
EOSINOPHIL NFR BLD AUTO: 2 %
ERYTHROCYTE [DISTWIDTH] IN BLOOD BY AUTOMATED COUNT: 12.7 % (ref 10–15)
GFR SERPL CREATININE-BSD FRML MDRD: >90 ML/MIN/1.73M2
GLUCOSE SERPL-MCNC: 108 MG/DL (ref 70–99)
HCG SERPL QL: NEGATIVE
HCG UR QL: NEGATIVE
HCT VFR BLD AUTO: 36.1 % (ref 35–47)
HGB BLD-MCNC: 12.7 G/DL (ref 11.7–15.7)
IMM GRANULOCYTES # BLD: 0.1 10E3/UL
IMM GRANULOCYTES NFR BLD: 1 %
INTERNAL QC OK POCT: NORMAL
INTERPRETATION ECG - MUSE: NORMAL
LYMPHOCYTES # BLD AUTO: 2.1 10E3/UL (ref 0.8–5.3)
LYMPHOCYTES NFR BLD AUTO: 26 %
MCH RBC QN AUTO: 28.8 PG (ref 26.5–33)
MCHC RBC AUTO-ENTMCNC: 35.2 G/DL (ref 31.5–36.5)
MCV RBC AUTO: 82 FL (ref 78–100)
MONOCYTES # BLD AUTO: 0.4 10E3/UL (ref 0–1.3)
MONOCYTES NFR BLD AUTO: 4 %
NEUTROPHILS # BLD AUTO: 5.4 10E3/UL (ref 1.6–8.3)
NEUTROPHILS NFR BLD AUTO: 66 %
NRBC # BLD AUTO: 0 10E3/UL
NRBC BLD AUTO-RTO: 0 /100
P AXIS - MUSE: 43 DEGREES
PLATELET # BLD AUTO: 237 10E3/UL (ref 150–450)
POCT KIT EXPIRATION DATE: NORMAL
POCT KIT LOT NUMBER: NORMAL
POTASSIUM SERPL-SCNC: 4.2 MMOL/L (ref 3.4–5.3)
PR INTERVAL - MUSE: 178 MS
QRS DURATION - MUSE: 90 MS
QT - MUSE: 390 MS
QTC - MUSE: 444 MS
R AXIS - MUSE: 21 DEGREES
RBC # BLD AUTO: 4.41 10E6/UL (ref 3.8–5.2)
SODIUM SERPL-SCNC: 141 MMOL/L (ref 136–145)
SYSTOLIC BLOOD PRESSURE - MUSE: NORMAL MMHG
T AXIS - MUSE: 8 DEGREES
VENTRICULAR RATE- MUSE: 78 BPM
WBC # BLD AUTO: 8.1 10E3/UL (ref 4–11)

## 2023-08-06 PROCEDURE — 96374 THER/PROPH/DIAG INJ IV PUSH: CPT

## 2023-08-06 PROCEDURE — 99283 EMERGENCY DEPT VISIT LOW MDM: CPT | Mod: 25 | Performed by: EMERGENCY MEDICINE

## 2023-08-06 PROCEDURE — 36415 COLL VENOUS BLD VENIPUNCTURE: CPT | Performed by: EMERGENCY MEDICINE

## 2023-08-06 PROCEDURE — 85025 COMPLETE CBC W/AUTO DIFF WBC: CPT | Performed by: EMERGENCY MEDICINE

## 2023-08-06 PROCEDURE — 93010 ELECTROCARDIOGRAM REPORT: CPT | Performed by: EMERGENCY MEDICINE

## 2023-08-06 PROCEDURE — 81025 URINE PREGNANCY TEST: CPT | Performed by: EMERGENCY MEDICINE

## 2023-08-06 PROCEDURE — 99285 EMERGENCY DEPT VISIT HI MDM: CPT | Mod: 25

## 2023-08-06 PROCEDURE — 93005 ELECTROCARDIOGRAM TRACING: CPT | Performed by: EMERGENCY MEDICINE

## 2023-08-06 PROCEDURE — 80048 BASIC METABOLIC PNL TOTAL CA: CPT | Performed by: EMERGENCY MEDICINE

## 2023-08-06 PROCEDURE — 96372 THER/PROPH/DIAG INJ SC/IM: CPT | Performed by: EMERGENCY MEDICINE

## 2023-08-06 PROCEDURE — 96375 TX/PRO/DX INJ NEW DRUG ADDON: CPT

## 2023-08-06 PROCEDURE — 84703 CHORIONIC GONADOTROPIN ASSAY: CPT | Performed by: EMERGENCY MEDICINE

## 2023-08-06 PROCEDURE — 93010 ELECTROCARDIOGRAM REPORT: CPT | Mod: 77 | Performed by: EMERGENCY MEDICINE

## 2023-08-06 PROCEDURE — 99284 EMERGENCY DEPT VISIT MOD MDM: CPT | Mod: 25 | Performed by: EMERGENCY MEDICINE

## 2023-08-06 PROCEDURE — 250N000011 HC RX IP 250 OP 636: Mod: JZ | Performed by: EMERGENCY MEDICINE

## 2023-08-06 PROCEDURE — 93005 ELECTROCARDIOGRAM TRACING: CPT

## 2023-08-06 PROCEDURE — G1010 CDSM STANSON: HCPCS

## 2023-08-06 PROCEDURE — 99285 EMERGENCY DEPT VISIT HI MDM: CPT | Mod: 25 | Performed by: EMERGENCY MEDICINE

## 2023-08-06 RX ORDER — KETOROLAC TROMETHAMINE 15 MG/ML
15 INJECTION, SOLUTION INTRAMUSCULAR; INTRAVENOUS ONCE
Status: COMPLETED | OUTPATIENT
Start: 2023-08-06 | End: 2023-08-06

## 2023-08-06 RX ORDER — DROPERIDOL 2.5 MG/ML
0.62 INJECTION, SOLUTION INTRAMUSCULAR; INTRAVENOUS ONCE
Status: COMPLETED | OUTPATIENT
Start: 2023-08-06 | End: 2023-08-06

## 2023-08-06 RX ADMIN — DROPERIDOL 0.62 MG: 2.5 INJECTION, SOLUTION INTRAMUSCULAR; INTRAVENOUS at 10:45

## 2023-08-06 RX ADMIN — KETOROLAC TROMETHAMINE 15 MG: 15 INJECTION, SOLUTION INTRAMUSCULAR; INTRAVENOUS at 10:46

## 2023-08-06 ASSESSMENT — ACTIVITIES OF DAILY LIVING (ADL): ADLS_ACUITY_SCORE: 37

## 2023-08-06 NOTE — ED PROVIDER NOTES
[facility] EMERGENCY DEPARTMENT ([place])    8/06/23      ED PROVIDER NOTE      History     Chief Complaint   Patient presents with    Headache     Headache,     Nausea, Vomiting, & Diarrhea     Headache causing vomiting in the morning     The history is provided by the patient and medical records.     Sadaf Ross is a 22 year old female with a past medical history significant for borderline personality disorder, bipolar 1 disorder, and major depressive disorder who presents the ED for evaluation of headache.  Patient has presented to this facility with the same symptoms for the past 3 days.  She states that her headache has not been improving, and returned promptly after leaving here yesterday.  Patient notes that the pain is most prominent on her forehead, but that it wraps posteriorly around her skull.  States that she has also been experiencing chills and dizziness.  She has been taking over the counter cold and flu medication, Tylenol, and ibuprofen, but with no relief.  Patient states that her constant pain has been causing her to feel mildly suicidal.  She states that she does not have anything to hurt herself at the moment, but is concerned that she may find a way to injure herself.  Patient denies recent fevers.    Past Medical History  Past Medical History:   Diagnosis Date    ADHD (attention deficit hyperactivity disorder)     Bipolar 1 disorder (H)     Borderline personality disorder (H)     Depression     Depressive disorder     Intellectual disability     Obesity     Syncope      Past Surgical History:   Procedure Laterality Date    APPENDECTOMY      APPENDECTOMY       acetaminophen (TYLENOL) 325 MG tablet  albuterol (PROAIR HFA/PROVENTIL HFA/VENTOLIN HFA) 108 (90 Base) MCG/ACT inhaler  ARIPiprazole lauroxil ER (ARISTADA) 882 MG/3.2ML intra-muscular  bisacodyl (DULCOLAX) 5 MG EC tablet  calcium carbonate (TUMS) 500 MG chewable tablet  cyclobenzaprine (FLEXERIL) 10 MG  tablet  dicyclomine (BENTYL) 20 MG tablet  docusate sodium (COLACE) 50 MG capsule  famotidine (PEPCID) 20 MG tablet  FLUoxetine (PROZAC) 40 MG capsule  hydrocortisone, Perianal, (HYDROCORTISONE) 2.5 % cream  hydrOXYzine (ATARAX) 50 MG tablet  LORazepam (ATIVAN) 0.5 MG tablet  mupirocin (BACTROBAN) 2 % external ointment  OLANZapine zydis (ZYPREXA) 5 MG ODT  ondansetron (ZOFRAN) 4 MG tablet  ondansetron (ZOFRAN) 4 MG tablet  pantoprazole (PROTONIX) 40 MG EC tablet  polyethylene glycol (MIRALAX) 17 GM/Dose powder  promethazine (PHENERGAN) 12.5 MG tablet  sennosides (SENOKOT) 8.6 MG tablet  traZODone (DESYREL) 50 MG tablet  Vitamin D, Cholecalciferol, 25 MCG (1000 UT) TABS      Allergies   Allergen Reactions    Penicillins Rash and Unknown     Family History  Family History   Problem Relation Age of Onset    Diabetes Type 1 Father     Cancer Paternal Grandfather      Social History   Social History     Tobacco Use    Smoking status: Every Day     Packs/day: 1.00     Years: 5.00     Pack years: 5.00     Types: Cigarettes    Smokeless tobacco: Never   Substance Use Topics    Alcohol use: No    Drug use: No      Past medical history, past surgical history, medications, allergies, family history, and social history were reviewed with the patient. No additional pertinent items.        Physical Exam   BP: (!) 144/76  Pulse: 90  Temp: 99.6  F (37.6  C)  Resp: 18  Physical Exam  Physical Exam   Constitutional:   well nourished, well developed, resting comfortably   HENT:   Head: Normocephalic and atraumatic.   Eyes: Conjunctivae are normal. Pupils are equal, round, and reactive to light.   Cardiovascular: regular rate and rhythm without murmurs or gallops  Pulmonary/Chest: Clear to auscultation bilaterally, with no wheezes or retractions. No respiratory distress.  GI: Soft with good bowel sounds.  Non-tender, non-distendedBack:  No bony or CVA tenderness   Musculoskeletal:  no edema  Skin: Skin is warm and dry. No rash noted.    Neurological: alert and oriented to person, place, and time. Nonfocal exam, answering questions appropriately moving all 4 extremities sensation intact throughout speech is clear.  Psychiatric:  Speech is normal. Judgment and thought content normal. mood appears normal. Patient is not agitated, not aggressive, not hyperactive, not actively hallucinating and not combative.  Patient has passive suicidal ideation    ED Course, Procedures, & Data    9:33 AM  The patient was seen and examined by Radha Younger   In [facility]            EKG Interpretation:      Interpreted by Radha Younger MD  Time reviewed:0950 am   Symptoms at time of EKG: see hpi   Rhythm: Normal sinus   Rate: Normal  Axis: Normal  Ectopy: None  Conduction: Normal  ST Segments/ T Waves: No acute ischemic changes and Poor R wave progression  Q Waves: None  Comparison to prior: Unchanged from 8/5/2023    Clinical Impression: Normal sinus rhythm, rate of 78 bpm, with poor R wave progression, unchanged with from prior         Results for orders placed or performed during the hospital encounter of 08/06/23   Basic metabolic panel     Status: Abnormal   Result Value Ref Range    Sodium 141 136 - 145 mmol/L    Potassium 4.2 3.4 - 5.3 mmol/L    Chloride 107 98 - 107 mmol/L    Carbon Dioxide (CO2) 21 (L) 22 - 29 mmol/L    Anion Gap 13 7 - 15 mmol/L    Urea Nitrogen 8.1 6.0 - 20.0 mg/dL    Creatinine 0.84 0.51 - 0.95 mg/dL    Calcium 9.3 8.6 - 10.0 mg/dL    Glucose 108 (H) 70 - 99 mg/dL    GFR Estimate >90 >60 mL/min/1.73m2   HCG qualitative pregnancy (blood)     Status: Normal   Result Value Ref Range    hCG Serum Qualitative Negative Negative   CBC with platelets and differential     Status: None   Result Value Ref Range    WBC Count 8.1 4.0 - 11.0 10e3/uL    RBC Count 4.41 3.80 - 5.20 10e6/uL    Hemoglobin 12.7 11.7 - 15.7 g/dL    Hematocrit 36.1 35.0 - 47.0 %    MCV 82 78 - 100 fL    MCH 28.8 26.5 - 33.0 pg    MCHC 35.2 31.5 - 36.5 g/dL     RDW 12.7 10.0 - 15.0 %    Platelet Count 237 150 - 450 10e3/uL    % Neutrophils 66 %    % Lymphocytes 26 %    % Monocytes 4 %    % Eosinophils 2 %    % Basophils 1 %    % Immature Granulocytes 1 %    NRBCs per 100 WBC 0 <1 /100    Absolute Neutrophils 5.4 1.6 - 8.3 10e3/uL    Absolute Lymphocytes 2.1 0.8 - 5.3 10e3/uL    Absolute Monocytes 0.4 0.0 - 1.3 10e3/uL    Absolute Eosinophils 0.1 0.0 - 0.7 10e3/uL    Absolute Basophils 0.0 0.0 - 0.2 10e3/uL    Absolute Immature Granulocytes 0.1 <=0.4 10e3/uL    Absolute NRBCs 0.0 10e3/uL   EKG 12-lead, tracing only     Status: None   Result Value Ref Range    Systolic Blood Pressure  mmHg    Diastolic Blood Pressure  mmHg    Ventricular Rate 78 BPM    Atrial Rate 78 BPM    MS Interval 178 ms    QRS Duration 90 ms     ms    QTc 444 ms    P Axis 43 degrees    R AXIS 21 degrees    T Axis 8 degrees    Interpretation ECG       Sinus rhythm  Possible Anterior infarct , age undetermined  Abnormal ECG    Unconfirmed report - interpretation of this ECG is computer generated - see medical record for final interpretation  Confirmed by - EMERGENCY ROOM, PHYSICIAN ([id number]),  RENATA HONEYCUTT ([id number]) on 8/6/2023 11:33:58 AM     hCG qual urine POCT     Status: Normal   Result Value Ref Range    HCG Qual Urine Negative Negative    Internal QC Check POCT Valid Valid    POCT Kit Lot Number [id number]     POCT Kit Expiration Date 08/28/2024    CBC with platelets differential     Status: None    Narrative    The following orders were created for panel order CBC with platelets differential.  Procedure                               Abnormality         Status                     ---------                               -----------         ------                     CBC with platelets and d...[[id number]]                      Final result                 Please view results for these tests on the individual orders.     Medications   ketorolac (TORADOL) injection 15 mg (15 mg Intramuscular  $Given 8/6/23 1046)   droPERidol (INAPSINE) injection 0.625 mg (0.625 mg Intravenous $Given 8/6/23 1045)     Labs Ordered and Resulted from Time of ED Arrival to Time of ED Departure   BASIC METABOLIC PANEL - Abnormal       Result Value    Sodium 141      Potassium 4.2      Chloride 107      Carbon Dioxide (CO2) 21 (*)     Anion Gap 13      Urea Nitrogen 8.1      Creatinine 0.84      Calcium 9.3      Glucose 108 (*)     GFR Estimate >90     HCG QUALITATIVE PREGNANCY - Normal    hCG Serum Qualitative Negative     HCG QUALITATIVE URINE POCT - Normal    HCG Qual Urine Negative      Internal QC Check POCT Valid      POCT Kit Lot Number [id number]      POCT Kit Expiration Date 08/28/2024     CBC WITH PLATELETS AND DIFFERENTIAL    WBC Count 8.1      RBC Count 4.41      Hemoglobin 12.7      Hematocrit 36.1      MCV 82      MCH 28.8      MCHC 35.2      RDW 12.7      Platelet Count 237      % Neutrophils 66      % Lymphocytes 26      % Monocytes 4      % Eosinophils 2      % Basophils 1      % Immature Granulocytes 1      NRBCs per 100 WBC 0      Absolute Neutrophils 5.4      Absolute Lymphocytes 2.1      Absolute Monocytes 0.4      Absolute Eosinophils 0.1      Absolute Basophils 0.0      Absolute Immature Granulocytes 0.1      Absolute NRBCs 0.0       Head CT w/o contrast    (Results Pending)          Critical care was not performed.     Medical Decision Making  The patient's presentation was of moderate complexity (an acute illness with systemic symptoms).    The patient's evaluation involved:  review of external note(s) from 3+ sources (prior ED and office visits)  ordering and/or review of 3+ test(s) in this encounter (see separate area of note for details)    The patient's management necessitated moderate risk (prescription drug management including medications given in the ED).    Assessment & Plan        I have reviewed the nursing notes.   Emergency Department course:  The patient was seen and examined at 0934 am.    Chart Review shows that the patient was seen yesterday in the emergency department for a similar headache and treated with Toradol and Inapsine.  This did help improve her symptoms.  She was also seen on August 4, 2 days ago for headache and treated with Zyprexa.    Patient has an ED care plan due to frequent visits.  Apparently she has become extremely disruptive while in the ED.  She was in a group home with 24/7 staffing and has an active outpatient team who follows her.    EKG shows  Normal sinus rhythm, rate of 78 bpm, with poor R wave progression, unchanged with from prior  Given her repeated visits, I elected to check laboratory studies.  hCG is negative.  CBC is unremarkable, with a WBC of 8.1.  Basic metabolic panel shows a slightly low carbon dioxide of 21 and essentially unremarkable.  Head CT is unremarkable.     Sadaf Ross is a 22 year old female who presents today for headache.  This is her third ED visit in 2 days for headache.  She is nontoxic-appearing and appears at her baseline.  Head CT and laboratory studies are unremarkable.  Her headache is improved after receiving Toradol and Inapsine.  I had initially intended to have her undergo behavioral evaluation.  She states she is not suicidal and does not want to talk to behavioral evaluation.  I believe she is safe to be discharged back to her group home.  I have placed a referral to neurology to further evaluate her headaches.  She was also discharged with the clinic number to the neurology clinic.  If there is a delay in follow-up, she can follow-up with her regular physician.  I have reviewed the findings, diagnosis, plan and need for follow up with the patient.    New Prescriptions    No medications on file       Final diagnoses:   Headache disorder   INhi , am serving as a trained medical scribe to document services personally performed by Radha Younger MD, based on the provider's statements to me.      I, Radha Younger MD,  was physically present and have reviewed and verified the accuracy of this note documented by Nhi Eric.   This note was created in part by the use of Dragon voice recognition dictation system. Inadvertent grammatical errors and typographical errors may still exist.  MD Radha Ann MD  [facility] EMERGENCY DEPARTMENT  8/6/2023     Radha Younger MD  08/06/23 [id number]

## 2023-08-06 NOTE — DISCHARGE INSTRUCTIONS
Please make an appointment to follow up with Your Primary Care Provider and Neurology Clinic (phone: [phone]).    Try taking tylenol or ibuprofen and taking a nap to treat your headache before coming to the emergency department.     Return to the ED for worsening headache, vision changes, recurrent vomiting, confusion, decreased responsiveness, fever, numbness or tingling, weakness, dizziness or problems with balance, or difficulty with speech.

## 2023-08-06 NOTE — DISCHARGE INSTRUCTIONS
Please make an appointment to follow up with Neurology Clinic (phone: [phone]) in 2-3 weeks .  They may be able to help with your headaches.  I have placed a referral to help you get an appointment.  You can also follow-up with your regular doctor for further evaluation of your headaches.

## 2023-08-06 NOTE — ED NOTES
On repeat assessment patient was well-appearing, nontoxic, stated that her headache was resolved and she was ready to go home.  She was discharged home per Dr. Whalen's plan.     MD Karishma Hooper, Bernardino Cordova MD  08/05/23 1950

## 2023-08-06 NOTE — ED PROVIDER NOTES
[facility] EMERGENCY DEPARTMENT ([facility])    8/06/23          History     Chief Complaint   Patient presents with    Headache     Patient reports a headache for the last 24 hours, been seen multiple times for the same headache.      The history is provided by the patient and medical records.     Sadaf Ross is a 22 year old female with past medical history of borderline personality disorder, ADHD, depression, intellectual disability, who frequently presents to the emergency department from her group home requesting admission (with ED care plan) who presents to the emergency department with a chief complaint of headache.  Of note, patient was evaluated for this headache earlier today and discharged.  She was also seen for this headache yesterday and the fourth. On arrival, patient states that she has been having migraines. She has migraine medications such as headache relief, Tylenol and ibuprofen in her backpack. Did not take any of these prior to coming in. Patient comes from a group home. No other medical conditions except migraine headaches. No other concerns.         Past Medical History  Past Medical History:   Diagnosis Date    ADHD (attention deficit hyperactivity disorder)     Bipolar 1 disorder (H)     Borderline personality disorder (H)     Depression     Depressive disorder     Intellectual disability     Obesity     Syncope      Past Surgical History:   Procedure Laterality Date    APPENDECTOMY      APPENDECTOMY       acetaminophen (TYLENOL) 325 MG tablet  albuterol (PROAIR HFA/PROVENTIL HFA/VENTOLIN HFA) 108 (90 Base) MCG/ACT inhaler  ARIPiprazole lauroxil ER (ARISTADA) 882 MG/3.2ML intra-muscular  bisacodyl (DULCOLAX) 5 MG EC tablet  calcium carbonate (TUMS) 500 MG chewable tablet  cyclobenzaprine (FLEXERIL) 10 MG tablet  dicyclomine (BENTYL) 20 MG tablet  docusate sodium (COLACE) 50 MG capsule  famotidine (PEPCID) 20 MG tablet  FLUoxetine (PROZAC) 40 MG capsule  hydrocortisone,  Perianal, (HYDROCORTISONE) 2.5 % cream  hydrOXYzine (ATARAX) 50 MG tablet  LORazepam (ATIVAN) 0.5 MG tablet  mupirocin (BACTROBAN) 2 % external ointment  OLANZapine zydis (ZYPREXA) 5 MG ODT  ondansetron (ZOFRAN) 4 MG tablet  ondansetron (ZOFRAN) 4 MG tablet  pantoprazole (PROTONIX) 40 MG EC tablet  polyethylene glycol (MIRALAX) 17 GM/Dose powder  promethazine (PHENERGAN) 12.5 MG tablet  sennosides (SENOKOT) 8.6 MG tablet  traZODone (DESYREL) 50 MG tablet  Vitamin D, Cholecalciferol, 25 MCG (1000 UT) TABS      Allergies   Allergen Reactions    Penicillins Rash and Unknown     Family History  Family History   Problem Relation Age of Onset    Diabetes Type 1 Father     Cancer Paternal Grandfather      Social History   Social History     Tobacco Use    Smoking status: Every Day     Packs/day: 1.00     Years: 5.00     Pack years: 5.00     Types: Cigarettes    Smokeless tobacco: Never   Substance Use Topics    Alcohol use: No    Drug use: No      Past medical history, past surgical history, medications, allergies, family history, and social history were reviewed with the patient. No additional pertinent items.      A complete review of systems was performed with pertinent positives and negatives noted in the HPI, and all other systems negative.    Physical Exam   BP: 107/70  Pulse: 91  Temp: 99  F (37.2  C)  Resp: 16  SpO2: 97 %  Physical Exam  Vitals and nursing note reviewed.   Constitutional:       General: She is not in acute distress.     Appearance: Normal appearance. She is well-developed. She is obese. She is not ill-appearing or diaphoretic.   HENT:      Head: Normocephalic and atraumatic.      Nose: Nose normal.      Mouth/Throat:      Mouth: Mucous membranes are moist.   Eyes:      General: No scleral icterus.     Extraocular Movements: Extraocular movements intact.      Conjunctiva/sclera: Conjunctivae normal.   Cardiovascular:      Rate and Rhythm: Normal rate.   Pulmonary:      Effort: Pulmonary effort is  normal. No respiratory distress.      Breath sounds: No stridor.   Abdominal:      General: There is no distension.   Musculoskeletal:         General: No deformity or signs of injury. Normal range of motion.      Cervical back: Normal range of motion and neck supple. No rigidity.   Skin:     General: Skin is warm and dry.      Coloration: Skin is not jaundiced or pale.   Neurological:      General: No focal deficit present.      Mental Status: She is alert and oriented to person, place, and time.   Psychiatric:         Mood and Affect: Mood normal.         Behavior: Behavior normal.           ED Course, Procedures, & Data    5:55 PM  The patient was seen and examined by Marcy Gan MD. In [facility].   Procedures          Results for orders placed or performed during the hospital encounter of 08/06/23   Head CT w/o contrast     Status: None    Narrative    EXAM: CT HEAD W/O CONTRAST  DATE: 8/6/2023    INDICATION: Headache  COMPARISON: 02/05/2020   TECHNIQUE: Routine CT Head without IV contrast. Multiplanar reformats. Dose reduction techniques were used.    FINDINGS:  INTRACRANIAL CONTENTS: No evidence of acute intracranial hemorrhage or mass effect. Brain attenuation and morphology are normal. The ventricles and sulci are normal for age. Normal gray-white matter differentiation. The basilar cisterns are patent.    VISUALIZED ORBITS/SINUSES/MASTOIDS: The globes are unremarkable. The partially imaged paranasal sinuses and mastoid air cells are unremarkable.     BONES/SOFT TISSUES: The visualized skull base and calvarium are unremarkable.      Impression    IMPRESSION:    1.  No evidence of acute intracranial hemorrhage or mass effect.    Dr. Falcon discussed the results with Dr. Younger on 8/6/2023 11:32 AM CDT by telephone.   Basic metabolic panel     Status: Abnormal   Result Value Ref Range    Sodium 141 136 - 145 mmol/L    Potassium 4.2 3.4 - 5.3 mmol/L    Chloride 107 98 - 107 mmol/L    Carbon Dioxide (CO2)  21 (L) 22 - 29 mmol/L    Anion Gap 13 7 - 15 mmol/L    Urea Nitrogen 8.1 6.0 - 20.0 mg/dL    Creatinine 0.84 0.51 - 0.95 mg/dL    Calcium 9.3 8.6 - 10.0 mg/dL    Glucose 108 (H) 70 - 99 mg/dL    GFR Estimate >90 >60 mL/min/1.73m2   HCG qualitative pregnancy (blood)     Status: Normal   Result Value Ref Range    hCG Serum Qualitative Negative Negative   CBC with platelets and differential     Status: None   Result Value Ref Range    WBC Count 8.1 4.0 - 11.0 10e3/uL    RBC Count 4.41 3.80 - 5.20 10e6/uL    Hemoglobin 12.7 11.7 - 15.7 g/dL    Hematocrit 36.1 35.0 - 47.0 %    MCV 82 78 - 100 fL    MCH 28.8 26.5 - 33.0 pg    MCHC 35.2 31.5 - 36.5 g/dL    RDW 12.7 10.0 - 15.0 %    Platelet Count 237 150 - 450 10e3/uL    % Neutrophils 66 %    % Lymphocytes 26 %    % Monocytes 4 %    % Eosinophils 2 %    % Basophils 1 %    % Immature Granulocytes 1 %    NRBCs per 100 WBC 0 <1 /100    Absolute Neutrophils 5.4 1.6 - 8.3 10e3/uL    Absolute Lymphocytes 2.1 0.8 - 5.3 10e3/uL    Absolute Monocytes 0.4 0.0 - 1.3 10e3/uL    Absolute Eosinophils 0.1 0.0 - 0.7 10e3/uL    Absolute Basophils 0.0 0.0 - 0.2 10e3/uL    Absolute Immature Granulocytes 0.1 <=0.4 10e3/uL    Absolute NRBCs 0.0 10e3/uL   EKG 12-lead, tracing only     Status: None   Result Value Ref Range    Systolic Blood Pressure  mmHg    Diastolic Blood Pressure  mmHg    Ventricular Rate 78 BPM    Atrial Rate 78 BPM    MS Interval 178 ms    QRS Duration 90 ms     ms    QTc 444 ms    P Axis 43 degrees    R AXIS 21 degrees    T Axis 8 degrees    Interpretation ECG       Sinus rhythm  Possible Anterior infarct , age undetermined  Abnormal ECG    Unconfirmed report - interpretation of this ECG is computer generated - see medical record for final interpretation  Confirmed by - EMERGENCY ROOM, PHYSICIAN ([id number]),  RENATA HONEYCUTT ([id number]) on 8/6/2023 11:33:58 AM     hCG qual urine POCT     Status: Normal   Result Value Ref Range    HCG Qual Urine Negative Negative     Internal QC Check POCT Valid Valid    POCT Kit Lot Number [id number]     POCT Kit Expiration Date 08/28/2024    CBC with platelets differential     Status: None    Narrative    The following orders were created for panel order CBC with platelets differential.  Procedure                               Abnormality         Status                     ---------                               -----------         ------                     CBC with platelets and d...[[id number]]                      Final result                 Please view results for these tests on the individual orders.     Medications - No data to display  Labs Ordered and Resulted from Time of ED Arrival to Time of ED Departure - No data to display  No orders to display          Critical care was not performed.     Medical Decision Making  The patient's presentation was of low complexity (an acute and uncomplicated illness or injury).    The patient's evaluation involved:  review of external note(s) from 2 sources (see separate area of note for details)    The patient's management necessitated moderate risk (prescription drug management including medications given in the ED) and moderate risk (limitations due to social determinants of health (see separate area of note for details)).    Assessment & Plan    Sadaf Ross is a 22 year old female with past medical history of borderline personality disorder, ADHD, depression, intellectual disability, who frequently presents to the emergency department from her group home requesting admission (with ED care plan) who presents to the emergency department with a chief complaint of headache.      Ddx: Migraine headache, tension headache, malingering    Patient is very well-appearing on arrival with a bright affect.  Joking with providers.  No visible distress.      I reviewed patient's ED care plan which states patient frequently calls 911 to be transported to the emergency department from her group home.   Providers believe she is coming to the ED and requesting admission because she does not like her group home.  The care plan recommend she be triaged and if no acute new concerns both medically and mental health wise she be returned to her group home.    Reviewed previous notes including documentation from encounter earlier this morning.  Patient received a head CT which was normal.  She was given droperidol and Toradol with improvement of her headache.  Labs were normal.  She was given a referral to neurology for follow-up.  Was then discharged back to her group home.  Patient states she started having another headache to return.  She did not try to take any of her over-the-counter medications to treat her symptoms.  She is willing to take them now.  ED RN supervise the patient and taking over-the-counter headache medications from patient's own supply that she brought with her in her backpack.  On reevaluation, the patient reports she is feeling improved.  She is not having any suicidal ideation or other mental health concerns and feels comfortable returning back to her group home.  I did encourage her to try her over-the-counter treatments prior to returning to the emergency department in the future if she develops a recurrent headache.  I also encouraged her to follow through with the appointment in neurology clinic for further management of chronic headaches.      I have reviewed the nursing notes. I have reviewed the findings, diagnosis, plan and need for follow up with the patient.    Discharge Medication List as of 8/6/2023  6:32 PM          Final diagnoses:   Nonintractable episodic headache, unspecified headache type   I, SAM DURAN, am serving as a trained medical scribe to document services personally performed by Marcy Gan MD, based on the provider's statements to me.     I, Marcy Gan MD, was physically present and have reviewed and verified the accuracy of this note documented by SAM  SOFIA DURAN.     Marcy Gan MD.     [facility] EMERGENCY DEPARTMENT  8/6/2023     Marcy Gan MD  08/06/23 [id number]

## 2023-08-06 NOTE — TELEPHONE ENCOUNTER
Complains of headache and disorientation. Recommended Call EMS.     Reason for Disposition   Difficult to awaken or acting confused (e.g., disoriented, slurred speech)    Protocols used: Headache-A-AH

## 2023-08-06 NOTE — ED NOTES
Arlene at [facility] ([phone]) was notified of pt ready to return to [facility]. Discharge instructions explained to pt, she verbalized understanding and agreed to adhere. Pt appears in stable condition.

## 2023-08-06 NOTE — ED NOTES
Pt has had ongoing HA for the past few days. Pt taking headache relief which includes acetaminophen (250mg), aspirin (250mg), and caffeine (65mg). Pt stated when she took this 2 days ago, it did provide relief.

## 2023-08-06 NOTE — ED NOTES
Bed: [facility]-  Expected date: 8/6/23  Expected time: 9:07 AM  Means of arrival:   Comments:  [address]: 23 y/o, F, Headache

## 2023-08-06 NOTE — ED NOTES
Bed: [facility]  Expected date: 8/6/23  Expected time: 5:40 PM  Means of arrival: Ambulance  Comments:  [address] 23yo female, headache

## 2023-08-07 ENCOUNTER — NURSE TRIAGE (OUTPATIENT)
Dept: NURSING | Facility: CLINIC | Age: 23
End: 2023-08-07
Payer: MEDICARE

## 2023-08-08 ENCOUNTER — HOSPITAL ENCOUNTER (EMERGENCY)
Facility: CLINIC | Age: 23
Discharge: HOME | End: 2023-08-08
Attending: EMERGENCY MEDICINE | Admitting: EMERGENCY MEDICINE
Payer: MEDICARE

## 2023-08-08 VITALS
OXYGEN SATURATION: 99 % | TEMPERATURE: 98.7 F | RESPIRATION RATE: 16 BRPM | HEART RATE: 92 BPM | SYSTOLIC BLOOD PRESSURE: 128 MMHG | DIASTOLIC BLOOD PRESSURE: 85 MMHG

## 2023-08-08 DIAGNOSIS — R45.851 SUICIDAL IDEATION: ICD-10-CM

## 2023-08-08 PROCEDURE — 99282 EMERGENCY DEPT VISIT SF MDM: CPT | Performed by: EMERGENCY MEDICINE

## 2023-08-08 PROCEDURE — 99283 EMERGENCY DEPT VISIT LOW MDM: CPT | Performed by: EMERGENCY MEDICINE

## 2023-08-08 ASSESSMENT — ACTIVITIES OF DAILY LIVING (ADL): ADLS_ACUITY_SCORE: 37

## 2023-08-08 NOTE — ED NOTES
Patient tells RN she came to the ED because she was having anxiety which made her feel like she was going to die. Patient denies SI. Started swearing at RN, and giving RN the middle finger. Provider agreed patient safe to go home in taxi ride. RN confirmed this with group home staff. Care handoff given to group home who is ready for patient

## 2023-08-08 NOTE — ED TRIAGE NOTES
"Patient has been vomiting, has a fear/feeling that she is going to die. Denies SI just \"anxiety attack.\"        "

## 2023-08-08 NOTE — ED PROVIDER NOTES
ED Provider Note  [facility]      History       HPI  Sadaf Ross is a 22 year old female who presents to us with a chief complaint of chronic suicidal ideation.  She does report vomiting earlier today.  She denies known nausea now.  She reports worsening nausea.  No abdominal pain.  No chest pain.    Past Medical History  Past Medical History:   Diagnosis Date    ADHD (attention deficit hyperactivity disorder)     Bipolar 1 disorder (H)     Borderline personality disorder (H)     Depression     Depressive disorder     Intellectual disability     Obesity     Syncope      Past Surgical History:   Procedure Laterality Date    APPENDECTOMY      APPENDECTOMY       acetaminophen (TYLENOL) 325 MG tablet  albuterol (PROAIR HFA/PROVENTIL HFA/VENTOLIN HFA) 108 (90 Base) MCG/ACT inhaler  ARIPiprazole lauroxil ER (ARISTADA) 882 MG/3.2ML intra-muscular  bisacodyl (DULCOLAX) 5 MG EC tablet  calcium carbonate (TUMS) 500 MG chewable tablet  cyclobenzaprine (FLEXERIL) 10 MG tablet  dicyclomine (BENTYL) 20 MG tablet  docusate sodium (COLACE) 50 MG capsule  famotidine (PEPCID) 20 MG tablet  FLUoxetine (PROZAC) 40 MG capsule  hydrocortisone, Perianal, (HYDROCORTISONE) 2.5 % cream  hydrOXYzine (ATARAX) 50 MG tablet  LORazepam (ATIVAN) 0.5 MG tablet  mupirocin (BACTROBAN) 2 % external ointment  OLANZapine zydis (ZYPREXA) 5 MG ODT  ondansetron (ZOFRAN) 4 MG tablet  ondansetron (ZOFRAN) 4 MG tablet  pantoprazole (PROTONIX) 40 MG EC tablet  polyethylene glycol (MIRALAX) 17 GM/Dose powder  promethazine (PHENERGAN) 12.5 MG tablet  sennosides (SENOKOT) 8.6 MG tablet  traZODone (DESYREL) 50 MG tablet  Vitamin D, Cholecalciferol, 25 MCG (1000 UT) TABS      Allergies   Allergen Reactions    Penicillins Rash and Unknown     Family History  Family History   Problem Relation Age of Onset    Diabetes Type 1 Father     Cancer Paternal Grandfather      Social History   Social History     Tobacco Use    Smoking status:  Every Day     Packs/day: 1.00     Years: 5.00     Pack years: 5.00     Types: Cigarettes    Smokeless tobacco: Never   Substance Use Topics    Alcohol use: No    Drug use: No         A medically appropriate review of systems was performed with pertinent positives and negatives noted in the HPI, and all other systems negative.    Physical Exam   BP: 128/85  Pulse: 92  Temp: 98.7  F (37.1  C)  Resp: 16  SpO2: 99 %  Physical Exam  Vitals and nursing note reviewed.   Constitutional:       General: She is not in acute distress.     Appearance: She is well-developed. She is not ill-appearing.   HENT:      Head: Normocephalic and atraumatic.      Right Ear: External ear normal.      Left Ear: External ear normal.      Nose: Nose normal.   Eyes:      Extraocular Movements: Extraocular movements intact.      Conjunctiva/sclera: Conjunctivae normal.   Pulmonary:      Effort: Pulmonary effort is normal. No respiratory distress.   Abdominal:      General: There is no distension.   Musculoskeletal:         General: No swelling or deformity.      Cervical back: Normal range of motion and neck supple.   Skin:     General: Skin is warm and dry.   Neurological:      Mental Status: Mental status is at baseline.      Comments: Alert, oriented   Psychiatric:         Mood and Affect: Mood normal.         Behavior: Behavior normal.         ED Course, Procedures, & Data      Procedures          No results found for any visits on 08/08/23.  Medications - No data to display  Labs Ordered and Resulted from Time of ED Arrival to Time of ED Departure - No data to display  No orders to display          Medical Decision Making  The patient's presentation is strongly suggestive of low complexity (a stable chronic illness).    The patient's evaluation involved:  review of external note(s) from 3+ sources (Most recent H&P in addition to clinic and ED note)  review of 2 test result(s) ordered prior to this encounter (Most recent BMP and CBC)    The  patient's management involved only low risk treatment.      Assessment & Plan    22-year-old female with a history of chronic suicidal ideation presents to us with the same today.  She did report vomiting earlier but states she is better now.  Has no active plan to kill herself.  She does report consistent anxiety.  Patient was evaluated many times for similar symptoms.  Her suicidal ideation is consistent and chronic.  She appears at her baseline today.  We will discharge the patient back home to her group home.    I have reviewed the nursing notes. I have reviewed the findings, diagnosis, plan and need for follow up with the patient.    New Prescriptions    No medications on file       Final diagnoses:   Suicidal ideation       Richie Sinha  [facility] EMERGENCY DEPARTMENT  8/8/2023     Richie Sinha,   08/08/23 [id number]

## 2023-08-08 NOTE — TELEPHONE ENCOUNTER
"Red Transfer from Scheduling    Sadaf states, \"I'm having some Mental Health issues\"    - She reports that she feels Suicidal  - Feels that she will hurt herself  - Her Room mate is present with her.    911 advised  Sdaaf states that she will call 911 herself.    Amarilis Woods RN  [facility]      Reason for Disposition   Patient is threatening suicide now    Additional Information   Negative: Patient attempted suicide    Protocols used: Suicide [id number]-A-DL    "

## 2023-08-11 ENCOUNTER — HOSPITAL ENCOUNTER (EMERGENCY)
Facility: CLINIC | Age: 23
Discharge: LEFT BEFORE DISCHARGE | End: 2023-08-11
Admitting: FAMILY MEDICINE
Payer: MEDICARE

## 2023-08-11 VITALS
HEART RATE: 93 BPM | DIASTOLIC BLOOD PRESSURE: 79 MMHG | SYSTOLIC BLOOD PRESSURE: 122 MMHG | BODY MASS INDEX: 42.51 KG/M2 | TEMPERATURE: 98.5 F | RESPIRATION RATE: 16 BRPM | WEIGHT: 249 LBS | OXYGEN SATURATION: 100 % | HEIGHT: 64 IN

## 2023-08-11 PROCEDURE — 99281 EMR DPT VST MAYX REQ PHY/QHP: CPT | Performed by: FAMILY MEDICINE

## 2023-08-11 NOTE — ED TRIAGE NOTES
Rt side pain, vomiting. Nausea x 1 day. BM yesterday hard. On depo-provera.     Triage Assessment       Row Name 08/11/23 7192       Triage Assessment (Adult)    Airway WDL WDL       Respiratory WDL    Respiratory WDL WDL       Skin Circulation/Temperature WDL    Skin Circulation/Temperature WDL WDL       Cardiac WDL    Cardiac WDL WDL       Peripheral/Neurovascular WDL    Peripheral Neurovascular WDL WDL       Cognitive/Neuro/Behavioral WDL    Cognitive/Neuro/Behavioral WDL WDL

## 2023-08-12 ENCOUNTER — NURSE TRIAGE (OUTPATIENT)
Dept: NURSING | Facility: CLINIC | Age: 23
End: 2023-08-12
Payer: MEDICARE

## 2023-08-12 ENCOUNTER — HOSPITAL ENCOUNTER (EMERGENCY)
Facility: CLINIC | Age: 23
Discharge: NURSING, REHAB OR LONG-TERM CARE | End: 2023-08-12
Attending: EMERGENCY MEDICINE | Admitting: EMERGENCY MEDICINE
Payer: MEDICARE

## 2023-08-12 VITALS
SYSTOLIC BLOOD PRESSURE: 127 MMHG | DIASTOLIC BLOOD PRESSURE: 84 MMHG | HEART RATE: 68 BPM | RESPIRATION RATE: 16 BRPM | TEMPERATURE: 97.7 F | OXYGEN SATURATION: 100 %

## 2023-08-12 DIAGNOSIS — M54.50 ACUTE RIGHT-SIDED LOW BACK PAIN WITHOUT SCIATICA: ICD-10-CM

## 2023-08-12 LAB
ALBUMIN UR-MCNC: 10 MG/DL
APPEARANCE UR: CLEAR
BILIRUB UR QL STRIP: NEGATIVE
COLOR UR AUTO: ABNORMAL
GLUCOSE UR STRIP-MCNC: NEGATIVE MG/DL
HCG UR QL: NEGATIVE
HGB UR QL STRIP: NEGATIVE
KETONES UR STRIP-MCNC: NEGATIVE MG/DL
LEUKOCYTE ESTERASE UR QL STRIP: NEGATIVE
MUCOUS THREADS #/AREA URNS LPF: PRESENT /LPF
NITRATE UR QL: NEGATIVE
PH UR STRIP: 5.5 [PH] (ref 5–7)
RBC URINE: 1 /HPF
SP GR UR STRIP: 1.03 (ref 1–1.03)
SQUAMOUS EPITHELIAL: 7 /HPF
TRANSITIONAL EPI: <1 /HPF
UROBILINOGEN UR STRIP-MCNC: NORMAL MG/DL
WBC URINE: 1 /HPF

## 2023-08-12 PROCEDURE — 99283 EMERGENCY DEPT VISIT LOW MDM: CPT | Performed by: EMERGENCY MEDICINE

## 2023-08-12 PROCEDURE — 81001 URINALYSIS AUTO W/SCOPE: CPT | Performed by: EMERGENCY MEDICINE

## 2023-08-12 PROCEDURE — 81025 URINE PREGNANCY TEST: CPT | Performed by: EMERGENCY MEDICINE

## 2023-08-12 ASSESSMENT — ACTIVITIES OF DAILY LIVING (ADL): ADLS_ACUITY_SCORE: 37

## 2023-08-12 NOTE — TELEPHONE ENCOUNTER
Pt calling with concerns about;    Lower back right side back pain X 3 days   9/10 constant    Took 400 mg ibuprofen today at about 2 PM - no relief at all  Urinating more frequently  Waking up during night to urinate  Nausea  Headache    Denies;  Fever  Too weak to stand  Any recent or specific injury    According to the protocol, patient should go to ED now.  Care advice given. Patient verbalizes understanding and agrees with plan of care.     Kaci Dee RN, Nurse Advisor 4:56 PM 8/12/2023  Reason for Disposition   Pain mainly in flank (i.e., in the side, over the lower ribs or just below the ribs)   [1] SEVERE pain (e.g., excruciating, scale 8-10) AND [2] present > 1 hour    Additional Information   Negative: Passed out (i.e., lost consciousness, collapsed and was not responding)   Negative: Shock suspected (e.g., cold/pale/clammy skin, too weak to stand, low BP, rapid pulse)   Negative: Sounds like a life-threatening emergency to the triager   Negative: Major injury to the back (e.g., MVA, fall > 10 feet or 3 meters, penetrating injury, etc.)   Negative: Followed a tailbone injury   Negative: [1] Pain in the upper back over the ribs (rib cage) AND [2] radiates (travels, goes) into chest   Negative: [1] Pain in the upper back over the ribs (rib cage) AND [2] worsened by coughing (or clearly increases with breathing)   Negative: Back pain during pregnancy   Negative: Passed out (i.e., lost consciousness, collapsed and was not responding)   Negative: Shock suspected (e.g., cold/pale/clammy skin, too weak to stand, low BP, rapid pulse)   Negative: Difficult to awaken or acting confused (e.g., disoriented, slurred speech)   Negative: Sounds like a life-threatening emergency to the triager   Negative: Followed a major injury to the back (e.g., MVA, fall > 10 feet or 3 meters, penetrating injury, etc.)   Negative: Back pain or flank pain during pregnancy   Negative: Upper, mid or lower back pain that occurs mainly  in the midline    Protocols used: Back Pain-A-AH, Flank Pain-A-AH

## 2023-08-12 NOTE — ED PROVIDER NOTES
ED Provider Note  [facility]      History     Chief Complaint   Patient presents with    Flank Pain     HPI    Sadaf Ross is a 22 year old female with a history of ADHD, bipolar disorder, intellectual disability, depression, borderline personality disorder, who resides at a group home, who presents to the emergency department today via EMS for right-sided back/flank pain.  The patient is extraordinarily well-known to the emergency department for multiple visits.  In fact, in the month of August alone, in the past 12 days, she has had 8 visits.  This is typical behavior for her.  Historically she has generally come in for mental health related complaints, though recently she has started to come in for various medical related complaints.    Patient reports that for the past 3 days she has had lower right sided back pain.  Patient denies any recent falls or trauma.  She has taken Motrin for her symptoms.  She denies any radiation of the pain down the buttocks into the leg.  Denies any weakness, denies any sensory changes, numbness/paresthesias.  No difficulty controlling bowel or bladder.  No fevers or chills.  No nasal congestion or sore throat.  No cough, no shortness of breath.  No abdominal pain at present.  Patient has some issues with chronic nausea and vomiting for which she has been seen on many occasions, denies any nausea and vomiting today.  No diarrhea.  She endorses urinary frequency, denies dysuria or hematuria.  No urinary urgency.  Patient is on Depo-Provera, does not remember exactly when her last dose was but knows she is due in September for her next dose.    She has not been seen for her back pain in the past.  She reports she called the nurse triage line and was told to come to the emergency department.    Past Medical History:   Diagnosis Date    ADHD (attention deficit hyperactivity disorder)     Bipolar 1 disorder (H)     Borderline personality disorder (H)      Depression     Depressive disorder     Intellectual disability     Obesity     Syncope        Past Surgical History:   Procedure Laterality Date    APPENDECTOMY      APPENDECTOMY         Family History   Problem Relation Age of Onset    Diabetes Type 1 Father     Cancer Paternal Grandfather        Social History     Tobacco Use    Smoking status: Every Day     Packs/day: 1.00     Years: 5.00     Pack years: 5.00     Types: Cigarettes    Smokeless tobacco: Never   Substance Use Topics    Alcohol use: No         Past Medical History  Past Medical History:   Diagnosis Date    ADHD (attention deficit hyperactivity disorder)     Bipolar 1 disorder (H)     Borderline personality disorder (H)     Depression     Depressive disorder     Intellectual disability     Obesity     Syncope      Past Surgical History:   Procedure Laterality Date    APPENDECTOMY      APPENDECTOMY       acetaminophen (TYLENOL) 325 MG tablet  albuterol (PROAIR HFA/PROVENTIL HFA/VENTOLIN HFA) 108 (90 Base) MCG/ACT inhaler  ARIPiprazole lauroxil ER (ARISTADA) 882 MG/3.2ML intra-muscular  bisacodyl (DULCOLAX) 5 MG EC tablet  calcium carbonate (TUMS) 500 MG chewable tablet  cyclobenzaprine (FLEXERIL) 10 MG tablet  dicyclomine (BENTYL) 20 MG tablet  docusate sodium (COLACE) 50 MG capsule  famotidine (PEPCID) 20 MG tablet  FLUoxetine (PROZAC) 40 MG capsule  hydrocortisone, Perianal, (HYDROCORTISONE) 2.5 % cream  hydrOXYzine (ATARAX) 50 MG tablet  LORazepam (ATIVAN) 0.5 MG tablet  mupirocin (BACTROBAN) 2 % external ointment  OLANZapine zydis (ZYPREXA) 5 MG ODT  ondansetron (ZOFRAN) 4 MG tablet  ondansetron (ZOFRAN) 4 MG tablet  pantoprazole (PROTONIX) 40 MG EC tablet  polyethylene glycol (MIRALAX) 17 GM/Dose powder  promethazine (PHENERGAN) 12.5 MG tablet  sennosides (SENOKOT) 8.6 MG tablet  traZODone (DESYREL) 50 MG tablet  Vitamin D, Cholecalciferol, 25 MCG (1000 UT) TABS      Allergies   Allergen Reactions    Penicillins Rash and Unknown     Family  "History  Family History   Problem Relation Age of Onset    Diabetes Type 1 Father     Cancer Paternal Grandfather      Social History   Social History     Tobacco Use    Smoking status: Every Day     Packs/day: 1.00     Years: 5.00     Pack years: 5.00     Types: Cigarettes    Smokeless tobacco: Never   Substance Use Topics    Alcohol use: No    Drug use: No         A medically appropriate review of systems was performed with pertinent positives and negatives noted in the HPI, and all other systems negative.    Physical Exam   BP: 117/79  Pulse: 96  Temp: 98.2  F (36.8  C)  Resp: 16  SpO2: 98 %  Physical Exam  Vitals and nursing note reviewed.   Constitutional:       General: She is not in acute distress.     Appearance: She is not diaphoretic.      Comments: Obese adult female, sitting in bed, moving around without any difficulty whatsoever   HENT:      Head: Atraumatic.      Mouth/Throat:      Mouth: Mucous membranes are moist.      Pharynx: Oropharynx is clear. No oropharyngeal exudate.   Eyes:      General: No scleral icterus.     Pupils: Pupils are equal, round, and reactive to light.   Cardiovascular:      Rate and Rhythm: Normal rate.      Pulses: Normal pulses.      Heart sounds: Normal heart sounds. No murmur heard.  Pulmonary:      Effort: No respiratory distress.      Breath sounds: Normal breath sounds.   Abdominal:      Palpations: Abdomen is soft.      Tenderness: There is no abdominal tenderness. There is no guarding or rebound.      Comments: Obese abdomen, soft, nontender to palpation, somewhat hypoactive bowel sounds.   Musculoskeletal:         General: No tenderness.      Comments: No midline tenderness to palpation.  No CVA tenderness to percussion.  No tenderness to percussion of paraspinous muscles or SI joint.  Patient states \"you're not pushing hard enough \".   Skin:     General: Skin is warm.      Findings: No rash.   Neurological:      Mental Status: She is alert.      Comments: Normal " plantarflexion and dorsiflexion bilaterally, sensation is intact to light touch           ED Course, Procedures, & Data      Procedures          Results for orders placed or performed during the hospital encounter of 08/12/23   UA with Microscopic reflex to Culture     Status: Abnormal    Specimen: Urine, Midstream   Result Value Ref Range    Color Urine Light Yellow Colorless, Straw, Light Yellow, Yellow    Appearance Urine Clear Clear    Glucose Urine Negative Negative mg/dL    Bilirubin Urine Negative Negative    Ketones Urine Negative Negative mg/dL    Specific Gravity Urine 1.027 1.003 - 1.035    Blood Urine Negative Negative    pH Urine 5.5 5.0 - 7.0    Protein Albumin Urine 10 (A) Negative mg/dL    Urobilinogen Urine Normal Normal, 2.0 mg/dL    Nitrite Urine Negative Negative    Leukocyte Esterase Urine Negative Negative    Mucus Urine Present (A) None Seen /LPF    RBC Urine 1 <=2 /HPF    WBC Urine 1 <=5 /HPF    Squamous Epithelials Urine 7 (H) <=1 /HPF    Transitional Epithelials Urine <1 <=1 /HPF    Narrative    Urine Culture not indicated   HCG qualitative urine     Status: Normal   Result Value Ref Range    hCG Urine Qualitative Negative Negative     Medications - No data to display  Labs Ordered and Resulted from Time of ED Arrival to Time of ED Departure   ROUTINE UA WITH MICROSCOPIC REFLEX TO CULTURE - Abnormal       Result Value    Color Urine Light Yellow      Appearance Urine Clear      Glucose Urine Negative      Bilirubin Urine Negative      Ketones Urine Negative      Specific Gravity Urine 1.027      Blood Urine Negative      pH Urine 5.5      Protein Albumin Urine 10 (*)     Urobilinogen Urine Normal      Nitrite Urine Negative      Leukocyte Esterase Urine Negative      Mucus Urine Present (*)     RBC Urine 1      WBC Urine 1      Squamous Epithelials Urine 7 (*)     Transitional Epithelials Urine <1     HCG QUALITATIVE URINE - Normal    hCG Urine Qualitative Negative       No orders to  "display          Critical care was not performed.     Medical Decision Making  The patient's presentation was of low complexity (an acute and uncomplicated illness or injury).    The patient's evaluation involved:  review of external note(s) from 1 sources (see separate area of note for details)  ordering and/or review of 2 test(s) in this encounter (see separate area of note for details)    The patient's management necessitated only low risk treatment.    Assessment & Plan    Patient presents to the emergency department today for the above complaints.  Differential diagnosis of right-sided low back pain could include musculoskeletal etiology which I tend to favor, though interestingly I am not able to reproduce the pain on exam as the patient states \"you are not pushing hard enough \".  Other etiologies could include urinary pathology such as pyelonephritis or renal stone which I think is less likely, however given her report of urinary frequency we will check a UA.    UA is is likely contaminated with 7 squamous cells, but no evidence of infection, UPT negative.    Suspect simple musculoskeletal pain.    Patient will be advised to use Tylenol and ibuprofen as needed for back pain, follow-up with primary clinic.  Patient verbalizes understanding.    I have reviewed the nursing notes. I have reviewed the findings, diagnosis, plan and need for follow up with the patient.    Discharge Medication List as of 8/12/2023  8:24 PM          Final diagnoses:   Acute right-sided low back pain without sciatica       Deepa Pelaez MD  [facility] EMERGENCY DEPARTMENT  8/12/2023     Deepa Pelaez MD  08/12/23 2321    "

## 2023-08-12 NOTE — ED TRIAGE NOTES
Triage Assessment       Row Name 08/12/23 1062       Triage Assessment (Adult)    Airway WDL WDL       Respiratory WDL    Respiratory WDL WDL       Skin Circulation/Temperature WDL    Skin Circulation/Temperature WDL WDL       Cardiac WDL    Cardiac WDL WDL       Peripheral/Neurovascular WDL    Peripheral Neurovascular WDL WDL       Cognitive/Neuro/Behavioral WDL    Cognitive/Neuro/Behavioral WDL WDL

## 2023-08-12 NOTE — ED NOTES
Bed: [facility]  Expected date:   Expected time:   Means of arrival:   Comments:  [id number]  22F  Flank pain

## 2023-08-13 ENCOUNTER — NURSE TRIAGE (OUTPATIENT)
Dept: NURSING | Facility: CLINIC | Age: 23
End: 2023-08-13
Payer: MEDICARE

## 2023-08-13 NOTE — ED NOTES
Spoke with [facility], informing them that pt. will be returning to them.  [facility] staff said it is okay to send pt. back by cab.  Cab ordered.

## 2023-08-13 NOTE — TELEPHONE ENCOUNTER
"Pt is phoning stating that she is vomiting and states her insides hurt  - pt states that vomiting started this am at 10am    Pt was seen in ED yesterday for low back pain - pt states that her low back pain only comes when she has a BM - pt states that she is not having any diarrhea     No fever     Pt states that vomiting is a chronic problem for her     Per disposition: See PCP Within 2 Weeks     Pt is going to [facility] now for evaluation     Care advice given per protocol and when to call back. Pt verbalized understanding and agrees to plan of care.    Diandra Eric RN  [facility] Nurse Advisor  10:50 AM 8/13/2023            Reason for Disposition   Vomiting is a chronic symptom (recurrent or ongoing AND present > 4 weeks)    Additional Information   Negative: Shock suspected (e.g., cold/pale/clammy skin, too weak to stand, low BP, rapid pulse)   Negative: Difficult to awaken or acting confused (e.g., disoriented, slurred speech)   Negative: Sounds like a life-threatening emergency to the triager   Negative: Vomiting occurs only while coughing   Negative: [1] Pregnant < 20 Weeks AND [2] nausea/vomiting began in early pregnancy (i.e., 4-8 weeks pregnant)   Negative: Chest pain   Negative: Headache is main symptom   Negative: Vomiting (or Nausea) in a cancer patient who is currently (or recently) receiving chemotherapy or radiation therapy, or cancer patient who has metastatic or end-stage cancer and is receiving palliative care   Negative: [1] Vomiting AND [2] contains red blood or black (\"coffee ground\") material  (Exception: few red streaks in vomit that only happened once)   Negative: Severe pain in one eye   Negative: Recent head injury (within last 3 days)   Negative: Recent abdominal injury (within last 3 days)   Negative: [1] Insulin-dependent diabetes (Type I) AND [2] glucose > 400 mg/dl (22 mmol/l)   Negative: [1] Vomiting AND [2] hernia is more painful or swollen than usual   Negative: [1] SEVERE vomiting " (e.g., 6 or more times/day) AND [2] present > 8 hours (Exception: patient sounds well, is drinking liquids, does not sound dehydrated, and vomiting has lasted less than 24 hours)   Negative: [1] MODERATE vomiting (e.g., 3 - 5 times/day) AND [2] age > 60 years   Negative: Severe headache (e.g., excruciating) (Exception: similar to previous migraines)   Negative: High-risk adult (e.g., diabetes mellitus, brain tumor, V-P shunt, hernia)   Negative: [1] Drinking very little AND [2] dehydration suspected (e.g., no urine > 12 hours, very dry mouth, very lightheaded)   Negative: Patient sounds very sick or weak to the triager   Negative: [1] Vomiting AND [2] abdomen looks much more swollen than usual   Negative: [1] Vomiting AND [2] contains bile (green color)   Negative: [1] Constant abdominal pain AND [2] present > 2 hours   Negative: [1] Fever > 103 F (39.4 C) AND [2] not able to get the fever down using Fever Care Advice   Negative: [1] Fever > 101 F (38.3 C) AND [2] age > 60 years   Negative: [1] Fever > 100.0 F (37.8 C) AND [2] bedridden (e.g., nursing home patient, CVA, chronic illness, recovering from surgery)   Negative: [1] Fever > 100.0 F (37.8 C) AND [2] weak immune system (e.g., HIV positive, cancer chemo, splenectomy, organ transplant, chronic steroids)   Negative: Taking any of the following medications: digoxin (Lanoxin), lithium, theophylline, phenytoin (Dilantin)   Negative: [1] MILD or MODERATE vomiting AND [2] present > 48 hours (2 days) (Exception: mild vomiting with associated diarrhea)   Negative: Fever present > 3 days (72 hours)   Negative: Vomiting a prescription medication   Negative: [1] MILD vomiting with diarrhea AND [2] present > 5 days   Negative: Substance use (drug use) or unhealthy alcohol use, known or suspected    Protocols used: Vomiting-A-AH

## 2023-08-13 NOTE — DISCHARGE INSTRUCTIONS
You have been seen in the emergency department today for your symptoms.  You did not have any sign of infection on your urine sample.  You likely have simple musculoskeletal back pain.  We recommend that you continue to take Tylenol and ibuprofen as needed. Follow-up with your primary clinic and they can potentially refer you for physical therapy if necessary.

## 2023-08-13 NOTE — TELEPHONE ENCOUNTER
"Pt calling with concerns about;    Vomiting 3 X today  Upper abdominal pain- center abdominal  ( 8/10/) X a couple weeks  Pt was seen in ED on 8/12/23 but states she did not have abdominal pain at that time    Denies;  Too weak to stand  Fever  Diarrhea   Vomiting bile/blood    According to the protocol, patient should call PCP within 2 weeks.  Care advice given. Patient verbalizes understanding and agrees with plan of care. Patient reports that she has a scheduled appt on 8/24/23.    Kaci Dee RN, Nurse Advisor 5:11 PM 8/13/2023  Reason for Disposition   Abdominal pain is a chronic symptom (recurrent or ongoing AND present > 4 weeks)    Additional Information   Negative: Shock suspected (e.g., cold/pale/clammy skin, too weak to stand, low BP, rapid pulse)   Negative: Difficult to awaken or acting confused (e.g., disoriented, slurred speech)   Negative: Passed out (i.e., lost consciousness, collapsed and was not responding)   Negative: Sounds like a life-threatening emergency to the triager   Negative: Chest pain   Negative: Pain is mainly in upper abdomen  (if needed ask: \"is it mainly above the belly button?\")   Negative: Followed an abdomen (stomach) injury   Negative: [1] Abdominal pain AND [2] pregnant < 20 weeks   Negative: [1] Abdominal pain AND [2] pregnant 20 or more weeks   Negative: [1] Abdominal pain AND [2] postpartum (from 0 to 6 weeks after delivery)   Negative: [1] SEVERE pain (e.g., excruciating) AND [2] present > 1 hour   Negative: [1] SEVERE pain AND [2] age > 60 years   Negative: [1] Vomiting AND [2] contains red blood or black (\"coffee ground\") material  (Exception: few red streaks in vomit that only happened once)   Negative: Blood in bowel movements (Exception: blood on surface of BM with constipation)   Negative: Black or tarry bowel movements (Exception: chronic-unchanged black-grey bowel movements AND is taking iron pills or Pepto-bismol)   Negative: Patient sounds very sick or weak to " the triager   Negative: [1] MILD-MODERATE pain AND [2] constant AND [3] present > 2 hours   Negative: [1] Vomiting AND [2] abdomen looks much more swollen than usual   Negative: [1] Vomiting AND [2] contains bile (green color)   Negative: White of the eyes have turned yellow (i.e., jaundice)   Negative: Fever > 103 F (39.4 C)   Negative: [1] Fever > 101 F (38.3 C) AND [2] age > 60 years   Negative: [1] Fever > 100.0 F (37.8 C) AND [2] bedridden (e.g., nursing home patient, CVA, chronic illness, recovering from surgery)   Negative: [1] Fever > 100.0 F (37.8 C) AND [2] diabetes mellitus or weak immune system (e.g., HIV positive, cancer chemo, splenectomy, organ transplant, chronic steroids)   Negative: [1] SEVERE pain AND [2] present < 1 hour   Negative: [1] MODERATE pain (e.g., interferes with normal activities) AND [2] pain comes and goes (cramps) AND [3] present > 24 hours  (Exception: pain with Vomiting or Diarrhea - see that Guideline)   Negative: [1] MILD pain (e.g., does not interfere with normal activities) AND [2] pain comes and goes (cramps) AND [3] present > 48 hours  (Exception: this same abdominal pain is a chronic symptom recurrent or ongoing AND present > 4 weeks)   Negative: Age > 60 years   Negative: Pregnancy suspected (e.g., missed last menstrual period)   Negative: Unusual vaginal discharge (e.g., bad smelling, yellow, green, or foamy-white)   Negative: Blood in urine (red, pink, or tea-colored)    Protocols used: Abdominal Pain - Female-A-AH

## 2023-08-14 ENCOUNTER — HOSPITAL ENCOUNTER (EMERGENCY)
Facility: CLINIC | Age: 23
Discharge: HOME | End: 2023-08-14
Attending: FAMILY MEDICINE | Admitting: FAMILY MEDICINE
Payer: MEDICARE

## 2023-08-14 VITALS
RESPIRATION RATE: 16 BRPM | TEMPERATURE: 98.9 F | DIASTOLIC BLOOD PRESSURE: 74 MMHG | HEART RATE: 66 BPM | SYSTOLIC BLOOD PRESSURE: 122 MMHG | OXYGEN SATURATION: 100 %

## 2023-08-14 DIAGNOSIS — R11.2 NAUSEA AND VOMITING, UNSPECIFIED VOMITING TYPE: ICD-10-CM

## 2023-08-14 LAB
ALBUMIN SERPL BCG-MCNC: 4.8 G/DL (ref 3.5–5.2)
ALP SERPL-CCNC: 74 U/L (ref 35–104)
ALT SERPL W P-5'-P-CCNC: 23 U/L (ref 0–50)
ANION GAP SERPL CALCULATED.3IONS-SCNC: 10 MMOL/L (ref 7–15)
AST SERPL W P-5'-P-CCNC: 31 U/L (ref 0–45)
BASOPHILS # BLD AUTO: 0 10E3/UL (ref 0–0.2)
BASOPHILS NFR BLD AUTO: 0 %
BILIRUB SERPL-MCNC: 0.3 MG/DL
BUN SERPL-MCNC: 11.6 MG/DL (ref 6–20)
CALCIUM SERPL-MCNC: 9.6 MG/DL (ref 8.6–10)
CHLORIDE SERPL-SCNC: 104 MMOL/L (ref 98–107)
CREAT SERPL-MCNC: 0.85 MG/DL (ref 0.51–0.95)
DEPRECATED HCO3 PLAS-SCNC: 24 MMOL/L (ref 22–29)
EOSINOPHIL # BLD AUTO: 0.2 10E3/UL (ref 0–0.7)
EOSINOPHIL NFR BLD AUTO: 2 %
ERYTHROCYTE [DISTWIDTH] IN BLOOD BY AUTOMATED COUNT: 12.7 % (ref 10–15)
GFR SERPL CREATININE-BSD FRML MDRD: >90 ML/MIN/1.73M2
GLUCOSE SERPL-MCNC: 93 MG/DL (ref 70–99)
HCT VFR BLD AUTO: 38.9 % (ref 35–47)
HGB BLD-MCNC: 13.4 G/DL (ref 11.7–15.7)
IMM GRANULOCYTES # BLD: 0 10E3/UL
IMM GRANULOCYTES NFR BLD: 0 %
LYMPHOCYTES # BLD AUTO: 2.4 10E3/UL (ref 0.8–5.3)
LYMPHOCYTES NFR BLD AUTO: 27 %
MCH RBC QN AUTO: 28.6 PG (ref 26.5–33)
MCHC RBC AUTO-ENTMCNC: 34.4 G/DL (ref 31.5–36.5)
MCV RBC AUTO: 83 FL (ref 78–100)
MONOCYTES # BLD AUTO: 0.4 10E3/UL (ref 0–1.3)
MONOCYTES NFR BLD AUTO: 4 %
NEUTROPHILS # BLD AUTO: 5.8 10E3/UL (ref 1.6–8.3)
NEUTROPHILS NFR BLD AUTO: 67 %
NRBC # BLD AUTO: 0 10E3/UL
NRBC BLD AUTO-RTO: 0 /100
PLAT MORPH BLD: NORMAL
PLATELET # BLD AUTO: 229 10E3/UL (ref 150–450)
POTASSIUM SERPL-SCNC: 4.3 MMOL/L (ref 3.4–5.3)
PROT SERPL-MCNC: 7.7 G/DL (ref 6.4–8.3)
RBC # BLD AUTO: 4.68 10E6/UL (ref 3.8–5.2)
RBC MORPH BLD: NORMAL
SODIUM SERPL-SCNC: 138 MMOL/L (ref 136–145)
WBC # BLD AUTO: 8.8 10E3/UL (ref 4–11)

## 2023-08-14 PROCEDURE — 258N000003 HC RX IP 258 OP 636: Performed by: FAMILY MEDICINE

## 2023-08-14 PROCEDURE — 96374 THER/PROPH/DIAG INJ IV PUSH: CPT | Performed by: FAMILY MEDICINE

## 2023-08-14 PROCEDURE — 99284 EMERGENCY DEPT VISIT MOD MDM: CPT | Performed by: FAMILY MEDICINE

## 2023-08-14 PROCEDURE — 85025 COMPLETE CBC W/AUTO DIFF WBC: CPT | Performed by: FAMILY MEDICINE

## 2023-08-14 PROCEDURE — 96361 HYDRATE IV INFUSION ADD-ON: CPT | Performed by: FAMILY MEDICINE

## 2023-08-14 PROCEDURE — 250N000011 HC RX IP 250 OP 636: Mod: JZ | Performed by: FAMILY MEDICINE

## 2023-08-14 PROCEDURE — 99284 EMERGENCY DEPT VISIT MOD MDM: CPT | Mod: 25 | Performed by: FAMILY MEDICINE

## 2023-08-14 PROCEDURE — 80053 COMPREHEN METABOLIC PANEL: CPT | Performed by: FAMILY MEDICINE

## 2023-08-14 PROCEDURE — 36415 COLL VENOUS BLD VENIPUNCTURE: CPT | Performed by: FAMILY MEDICINE

## 2023-08-14 RX ORDER — ONDANSETRON 2 MG/ML
4 INJECTION INTRAMUSCULAR; INTRAVENOUS ONCE
Status: COMPLETED | OUTPATIENT
Start: 2023-08-14 | End: 2023-08-14

## 2023-08-14 RX ADMIN — ONDANSETRON 4 MG: 2 INJECTION INTRAMUSCULAR; INTRAVENOUS at 18:38

## 2023-08-14 RX ADMIN — SODIUM CHLORIDE 1000 ML: 9 INJECTION, SOLUTION INTRAVENOUS at 18:37

## 2023-08-14 ASSESSMENT — ACTIVITIES OF DAILY LIVING (ADL)
ADLS_ACUITY_SCORE: 37
ADLS_ACUITY_SCORE: 37

## 2023-08-14 NOTE — ED NOTES
Bed: ED04  Expected date:   Expected time:   Means of arrival:   Comments:  Gaby Aponte 725 23 y/o F c/p and nausea

## 2023-08-14 NOTE — ED TRIAGE NOTES
Pt was brought by ambulance after complaining of chest pain and headache at primary care office.

## 2023-08-14 NOTE — ED PROVIDER NOTES
[facility] [place] ([facility])    8/14/23      ED PROVIDER NOTE  ED 04      History     Chief Complaint   Patient presents with    Nausea, Vomiting, & Diarrhea    Chest Pain     Chest Pain, nausea.      HPI  Sadaf Ross is a 22 year old female with a care plan in place; disruptive in ED, with a past medical history significant for ADHD, bipolar 1, BPD, depression, intellectual disability, and SI who presents to the emergency department for evaluation of somatic complaints including nausea vomiting and feeling lightheaded.  Patient states that she feels like she may be dehydrated and that she has been ill for 24 hours.  She denies any other acute psychiatric concerns at this time..    Per chart review, patient has been seen 8 times since the beginning of August and is well-known to this emergency department for multiple repeated visits.        Past Medical History  Past Medical History:   Diagnosis Date    ADHD (attention deficit hyperactivity disorder)     Bipolar 1 disorder (H)     Borderline personality disorder (H)     Depression     Depressive disorder     Intellectual disability     Obesity     Syncope      Past Surgical History:   Procedure Laterality Date    APPENDECTOMY      APPENDECTOMY       acetaminophen (TYLENOL) 325 MG tablet  albuterol (PROAIR HFA/PROVENTIL HFA/VENTOLIN HFA) 108 (90 Base) MCG/ACT inhaler  ARIPiprazole lauroxil ER (ARISTADA) 882 MG/3.2ML intra-muscular  bisacodyl (DULCOLAX) 5 MG EC tablet  calcium carbonate (TUMS) 500 MG chewable tablet  cyclobenzaprine (FLEXERIL) 10 MG tablet  dicyclomine (BENTYL) 20 MG tablet  docusate sodium (COLACE) 50 MG capsule  famotidine (PEPCID) 20 MG tablet  FLUoxetine (PROZAC) 40 MG capsule  hydrocortisone, Perianal, (HYDROCORTISONE) 2.5 % cream  hydrOXYzine (ATARAX) 50 MG tablet  LORazepam (ATIVAN) 0.5 MG tablet  mupirocin (BACTROBAN) 2 % external ointment  OLANZapine zydis (ZYPREXA) 5 MG ODT  ondansetron (ZOFRAN) 4 MG  tablet  ondansetron (ZOFRAN) 4 MG tablet  pantoprazole (PROTONIX) 40 MG EC tablet  polyethylene glycol (MIRALAX) 17 GM/Dose powder  promethazine (PHENERGAN) 12.5 MG tablet  sennosides (SENOKOT) 8.6 MG tablet  traZODone (DESYREL) 50 MG tablet  Vitamin D, Cholecalciferol, 25 MCG (1000 UT) TABS      Allergies   Allergen Reactions    Penicillins Rash and Unknown     Family History  Family History   Problem Relation Age of Onset    Diabetes Type 1 Father     Cancer Paternal Grandfather      Social History   Social History     Tobacco Use    Smoking status: Every Day     Packs/day: 1.00     Years: 5.00     Pack years: 5.00     Types: Cigarettes    Smokeless tobacco: Never   Substance Use Topics    Alcohol use: No    Drug use: No         A medically appropriate review of systems was performed with pertinent positives and negatives noted in the HPI, and all other systems negative.    Physical Exam   BP: (!) 140/76  Pulse: 94  Temp: 98.9  F (37.2  C)  Resp: 16  SpO2: 100 %  Physical Exam  Constitutional:       General: She is not in acute distress.     Appearance: Normal appearance. She is not diaphoretic.   HENT:      Head: Atraumatic.      Mouth/Throat:      Mouth: Mucous membranes are moist.   Eyes:      General: No scleral icterus.     Conjunctiva/sclera: Conjunctivae normal.   Cardiovascular:      Rate and Rhythm: Normal rate.      Heart sounds: Normal heart sounds.   Pulmonary:      Effort: No respiratory distress.      Breath sounds: Normal breath sounds.   Abdominal:      General: Abdomen is flat. There is no distension.      Tenderness: There is abdominal tenderness. There is no guarding or rebound.   Musculoskeletal:      Cervical back: Neck supple.   Skin:     General: Skin is warm.      Findings: No rash.   Neurological:      General: No focal deficit present.      Mental Status: She is alert and oriented to person, place, and time.      Sensory: No sensory deficit.      Motor: No weakness.      Coordination:  Coordination normal.         ED Course, Procedures, & Data      Procedures        Results for orders placed or performed during the hospital encounter of 08/14/23   Comprehensive metabolic panel     Status: Normal   Result Value Ref Range    Sodium 138 136 - 145 mmol/L    Potassium 4.3 3.4 - 5.3 mmol/L    Chloride 104 98 - 107 mmol/L    Carbon Dioxide (CO2) 24 22 - 29 mmol/L    Anion Gap 10 7 - 15 mmol/L    Urea Nitrogen 11.6 6.0 - 20.0 mg/dL    Creatinine 0.85 0.51 - 0.95 mg/dL    Calcium 9.6 8.6 - 10.0 mg/dL    Glucose 93 70 - 99 mg/dL    Alkaline Phosphatase 74 35 - 104 U/L    AST 31 0 - 45 U/L    ALT 23 0 - 50 U/L    Protein Total 7.7 6.4 - 8.3 g/dL    Albumin 4.8 3.5 - 5.2 g/dL    Bilirubin Total 0.3 <=1.2 mg/dL    GFR Estimate >90 >60 mL/min/1.73m2   CBC with platelets and differential     Status: None   Result Value Ref Range    WBC Count 8.8 4.0 - 11.0 10e3/uL    RBC Count 4.68 3.80 - 5.20 10e6/uL    Hemoglobin 13.4 11.7 - 15.7 g/dL    Hematocrit 38.9 35.0 - 47.0 %    MCV 83 78 - 100 fL    MCH 28.6 26.5 - 33.0 pg    MCHC 34.4 31.5 - 36.5 g/dL    RDW 12.7 10.0 - 15.0 %    Platelet Count 229 150 - 450 10e3/uL    % Neutrophils 67 %    % Lymphocytes 27 %    % Monocytes 4 %    % Eosinophils 2 %    % Basophils 0 %    % Immature Granulocytes 0 %    NRBCs per 100 WBC 0 <1 /100    Absolute Neutrophils 5.8 1.6 - 8.3 10e3/uL    Absolute Lymphocytes 2.4 0.8 - 5.3 10e3/uL    Absolute Monocytes 0.4 0.0 - 1.3 10e3/uL    Absolute Eosinophils 0.2 0.0 - 0.7 10e3/uL    Absolute Basophils 0.0 0.0 - 0.2 10e3/uL    Absolute Immature Granulocytes 0.0 <=0.4 10e3/uL    Absolute NRBCs 0.0 10e3/uL   RBC and Platelet Morphology     Status: None   Result Value Ref Range    Platelet Assessment  Automated Count Confirmed. Platelet morphology is normal.     Automated Count Confirmed. Platelet morphology is normal.    RBC Morphology Confirmed RBC Indices    CBC with platelets differential     Status: None    Narrative    The following  orders were created for panel order CBC with platelets differential.  Procedure                               Abnormality         Status                     ---------                               -----------         ------                     CBC with platelets and d...[[id number]]                      Final result               RBC and Platelet Morphology[[id number]]                      Final result                 Please view results for these tests on the individual orders.     Medications   0.9% sodium chloride BOLUS (0 mLs Intravenous Stopped 8/14/23 2044)   ondansetron (ZOFRAN) injection 4 mg (4 mg Intravenous $Given 8/14/23 0068)     Labs Ordered and Resulted from Time of ED Arrival to Time of ED Departure   COMPREHENSIVE METABOLIC PANEL - Normal       Result Value    Sodium 138      Potassium 4.3      Chloride 104      Carbon Dioxide (CO2) 24      Anion Gap 10      Urea Nitrogen 11.6      Creatinine 0.85      Calcium 9.6      Glucose 93      Alkaline Phosphatase 74      AST 31      ALT 23      Protein Total 7.7      Albumin 4.8      Bilirubin Total 0.3      GFR Estimate >90     CBC WITH PLATELETS AND DIFFERENTIAL    WBC Count 8.8      RBC Count 4.68      Hemoglobin 13.4      Hematocrit 38.9      MCV 83      MCH 28.6      MCHC 34.4      RDW 12.7      Platelet Count 229      % Neutrophils 67      % Lymphocytes 27      % Monocytes 4      % Eosinophils 2      % Basophils 0      % Immature Granulocytes 0      NRBCs per 100 WBC 0      Absolute Neutrophils 5.8      Absolute Lymphocytes 2.4      Absolute Monocytes 0.4      Absolute Eosinophils 0.2      Absolute Basophils 0.0      Absolute Immature Granulocytes 0.0      Absolute NRBCs 0.0     RBC AND PLATELET MORPHOLOGY    Platelet Assessment        Value: Automated Count Confirmed. Platelet morphology is normal.    RBC Morphology Confirmed RBC Indices       No orders to display          Critical care was not performed.     Medical Decision Making  The patient's  presentation was of moderate complexity (an acute illness with systemic symptoms).    The patient's evaluation involved:  ordering and/or review of 2 test(s) in this encounter (see separate area of note for details)    The patient's management necessitated IV fluids and Zofran with improvement of symptoms.    Assessment & Plan        I have reviewed the nursing notes. I have reviewed the findings, diagnosis, plan and need for follow up with the patient.    Patient presenting now with nausea and vomiting seems to have resolved she does have underlying significant psychiatric concerns but all chronic in nature no acute abnormalities patient will be returning back to her group home following up with her primary provider.    Final diagnoses:   Nausea and vomiting, unspecified vomiting type         [facility] EMERGENCY DEPARTMENT  8/14/2023     Robert Olea MD  08/15/23 7978

## 2023-08-15 ENCOUNTER — HOSPITAL ENCOUNTER (EMERGENCY)
Facility: CLINIC | Age: 23
Discharge: HOME | End: 2023-08-15
Attending: FAMILY MEDICINE | Admitting: FAMILY MEDICINE
Payer: MEDICARE

## 2023-08-15 VITALS
SYSTOLIC BLOOD PRESSURE: 125 MMHG | HEIGHT: 64 IN | TEMPERATURE: 98 F | HEART RATE: 70 BPM | OXYGEN SATURATION: 100 % | BODY MASS INDEX: 42.51 KG/M2 | DIASTOLIC BLOOD PRESSURE: 78 MMHG | WEIGHT: 249 LBS | RESPIRATION RATE: 16 BRPM

## 2023-08-15 DIAGNOSIS — R45.851 SUICIDAL IDEATION: ICD-10-CM

## 2023-08-15 DIAGNOSIS — F60.3 BORDERLINE PERSONALITY DISORDER (H): Chronic | ICD-10-CM

## 2023-08-15 DIAGNOSIS — F79 INTELLECTUAL DISABILITY: Chronic | ICD-10-CM

## 2023-08-15 PROCEDURE — 99284 EMERGENCY DEPT VISIT MOD MDM: CPT | Performed by: FAMILY MEDICINE

## 2023-08-15 PROCEDURE — 250N000013 HC RX MED GY IP 250 OP 250 PS 637: Performed by: FAMILY MEDICINE

## 2023-08-15 PROCEDURE — 99285 EMERGENCY DEPT VISIT HI MDM: CPT | Performed by: FAMILY MEDICINE

## 2023-08-15 RX ORDER — OLANZAPINE 10 MG/1
10 TABLET, ORALLY DISINTEGRATING ORAL ONCE
Status: COMPLETED | OUTPATIENT
Start: 2023-08-15 | End: 2023-08-15

## 2023-08-15 RX ORDER — LORAZEPAM 1 MG/1
1 TABLET ORAL ONCE
Status: COMPLETED | OUTPATIENT
Start: 2023-08-15 | End: 2023-08-15

## 2023-08-15 RX ORDER — HYDROXYZINE HYDROCHLORIDE 50 MG/1
50 TABLET, FILM COATED ORAL ONCE
Status: COMPLETED | OUTPATIENT
Start: 2023-08-15 | End: 2023-08-15

## 2023-08-15 RX ORDER — TRAZODONE HYDROCHLORIDE 100 MG/1
100 TABLET ORAL ONCE
Status: DISCONTINUED | OUTPATIENT
Start: 2023-08-15 | End: 2023-08-15

## 2023-08-15 RX ORDER — OLANZAPINE 5 MG/1
5 TABLET, ORALLY DISINTEGRATING ORAL ONCE
Status: DISCONTINUED | OUTPATIENT
Start: 2023-08-15 | End: 2023-08-15

## 2023-08-15 RX ADMIN — LORAZEPAM 1 MG: 1 TABLET ORAL at 19:54

## 2023-08-15 RX ADMIN — OLANZAPINE 10 MG: 10 TABLET, ORALLY DISINTEGRATING ORAL at 17:29

## 2023-08-15 RX ADMIN — HYDROXYZINE HYDROCHLORIDE 50 MG: 50 TABLET, FILM COATED ORAL at 18:17

## 2023-08-15 ASSESSMENT — ACTIVITIES OF DAILY LIVING (ADL)
ADLS_ACUITY_SCORE: 37
ADLS_ACUITY_SCORE: 37

## 2023-08-15 NOTE — ED NOTES
Arlene ([phone]) at pt's group home updated on pt's discharge status, group home able to take her back once transport is set up.

## 2023-08-15 NOTE — CONSULTS
DEC Consult Order placed. Per attending provider Dr. Olea, DEC order is not needed. Consult acknowledged.    Bonita Toscano LGSW

## 2023-08-15 NOTE — ED NOTES
"After complaining of head and stomach pain and not being able to sleep, pt said \"no wonder I have racing thoughts of wanting to kill myself, I want to be with my dad.\" Pt eventually settled again into a fitful sleep.  "

## 2023-08-15 NOTE — ED NOTES
Patient crying and in distress. Assessed biting arm with no skin breakage; area intact and blanchable red. De-escalation  interventions provided and Informed Dr. Olea. Patient given ordered medication and now calm. 1:1 present.

## 2023-08-15 NOTE — ED TRIAGE NOTES
Patient reports he roommate is on vacation and she miss her so much. Patient sobing uncontrollably in triage.      Triage Assessment       Row Name 08/15/23 0041       Triage Assessment (Adult)    Airway WDL WDL       Respiratory WDL    Respiratory WDL WDL       Skin Circulation/Temperature WDL    Skin Circulation/Temperature WDL WDL       Cardiac WDL    Cardiac WDL WDL       Peripheral/Neurovascular WDL    Peripheral Neurovascular WDL WDL       Cognitive/Neuro/Behavioral WDL    Cognitive/Neuro/Behavioral WDL WDL

## 2023-08-16 NOTE — DISCHARGE INSTRUCTIONS
Discharge back to group home continue with current outpatient cares follow-up with your outpatient providers

## 2023-08-16 NOTE — ED PROVIDER NOTES
ED Provider Note  [facility]      History     Chief Complaint   Patient presents with    Suicidal     SI with plan to cut self     HPI  Sadaf Ross is a 22 year old female who presents the emergency room with suicidal ideation noting that she feels as though she is at risk of cutting herself.  Patient was seen yesterday in the emergency room with physical complaints nausea vomiting dehydration which have essentially resolved she states that she is stressed because her roommate is gone and is feeling increased sense of suicidal ideation secondary to increased depression.  Patient has long history of significant mental health issues as well as developmental delay and personality disorder patient has been in the emergency room multiple times and at this time is willing to take medication and be reevaluated.    Past Medical History  Past Medical History:   Diagnosis Date    ADHD (attention deficit hyperactivity disorder)     Bipolar 1 disorder (H)     Borderline personality disorder (H)     Depression     Depressive disorder     Intellectual disability     Obesity     Syncope      Past Surgical History:   Procedure Laterality Date    APPENDECTOMY      APPENDECTOMY       acetaminophen (TYLENOL) 325 MG tablet  albuterol (PROAIR HFA/PROVENTIL HFA/VENTOLIN HFA) 108 (90 Base) MCG/ACT inhaler  ARIPiprazole lauroxil ER (ARISTADA) 882 MG/3.2ML intra-muscular  bisacodyl (DULCOLAX) 5 MG EC tablet  calcium carbonate (TUMS) 500 MG chewable tablet  cyclobenzaprine (FLEXERIL) 10 MG tablet  dicyclomine (BENTYL) 20 MG tablet  docusate sodium (COLACE) 50 MG capsule  famotidine (PEPCID) 20 MG tablet  FLUoxetine (PROZAC) 40 MG capsule  hydrocortisone, Perianal, (HYDROCORTISONE) 2.5 % cream  hydrOXYzine (ATARAX) 50 MG tablet  LORazepam (ATIVAN) 0.5 MG tablet  mupirocin (BACTROBAN) 2 % external ointment  OLANZapine zydis (ZYPREXA) 5 MG ODT  ondansetron (ZOFRAN) 4 MG tablet  ondansetron (ZOFRAN) 4 MG  "tablet  pantoprazole (PROTONIX) 40 MG EC tablet  polyethylene glycol (MIRALAX) 17 GM/Dose powder  promethazine (PHENERGAN) 12.5 MG tablet  sennosides (SENOKOT) 8.6 MG tablet  traZODone (DESYREL) 50 MG tablet  Vitamin D, Cholecalciferol, 25 MCG (1000 UT) TABS      Allergies   Allergen Reactions    Penicillins Rash and Unknown     Family History  Family History   Problem Relation Age of Onset    Diabetes Type 1 Father     Cancer Paternal Grandfather      Social History   Social History     Tobacco Use    Smoking status: Every Day     Packs/day: 1.00     Years: 5.00     Pack years: 5.00     Types: Cigarettes    Smokeless tobacco: Never   Substance Use Topics    Alcohol use: No    Drug use: No         A medically appropriate review of systems was performed with pertinent positives and negatives noted in the HPI, and all other systems negative.    Physical Exam   BP: 128/83  Pulse: 101  Temp: 98.4  F (36.9  C)  Resp: 16  Height: 162.6 cm (5' 4\")  Weight: 112.9 kg (249 lb)  SpO2: 98 %  Physical Exam  Constitutional:       General: She is not in acute distress.     Appearance: Normal appearance. She is not diaphoretic.   HENT:      Head: Atraumatic.      Mouth/Throat:      Mouth: Mucous membranes are moist.   Eyes:      General: No scleral icterus.     Conjunctiva/sclera: Conjunctivae normal.   Cardiovascular:      Rate and Rhythm: Normal rate.      Heart sounds: Normal heart sounds.   Pulmonary:      Effort: No respiratory distress.      Breath sounds: Normal breath sounds.   Abdominal:      General: Abdomen is flat.   Musculoskeletal:      Cervical back: Neck supple.   Skin:     General: Skin is warm.      Findings: No rash.   Neurological:      General: No focal deficit present.      Mental Status: She is alert and oriented to person, place, and time.      Sensory: No sensory deficit.      Motor: No weakness.      Coordination: Coordination normal.   Psychiatric:         Mood and Affect: Mood is anxious and depressed. " Affect is blunt.         Speech: Speech normal.         Behavior: Behavior is slowed.         Thought Content: Thought content includes suicidal ideation.           ED Course, Procedures, & Data      Procedures           Medications   OLANZapine zydis (zyPREXA) ODT tab 10 mg (10 mg Oral $Given 8/15/23 1729)   hydrOXYzine (ATARAX) tablet 50 mg (50 mg Oral $Given 8/15/23 1817)   LORazepam (ATIVAN) tablet 1 mg (1 mg Oral $Given 8/15/23 1954)     Labs Ordered and Resulted from Time of ED Arrival to Time of ED Departure - No data to display  No orders to display          Critical care was not performed.     Medical Decision Making  The patient's presentation was of moderate complexity (a chronic illness mild to moderate exacerbation, progression, or side effect of treatment).    The patient's evaluation involved:  discussion of management or test interpretation with another health professional (patient was discussed with both coordinator and  with reference to suicidal ideation and whether or not she needed full DEC assessment or hospitalization at this time patient appears to be stabilizing with medication intervention and will not require full assessment.)    The patient's management necessitated high risk (a decision regarding hospitalization).    Assessment & Plan        I have reviewed the nursing notes. I have reviewed the findings, diagnosis, plan and need for follow up with the patient.    Patient received medications here in the emergency room and returned to baseline at this time is no longer feeling suicidal and is willing to go back to the group home and is able to maintain safety.    Final diagnoses:   Intellectual disability   Borderline personality disorder (H)   Suicidal ideation       [facility] EMERGENCY DEPARTMENT  8/15/2023     Robert Olea MD  08/17/23 0601

## 2023-08-17 ENCOUNTER — HOSPITAL ENCOUNTER (EMERGENCY)
Facility: CLINIC | Age: 23
Discharge: HOME | End: 2023-08-17
Attending: EMERGENCY MEDICINE | Admitting: EMERGENCY MEDICINE
Payer: MEDICARE

## 2023-08-17 VITALS
DIASTOLIC BLOOD PRESSURE: 75 MMHG | OXYGEN SATURATION: 98 % | SYSTOLIC BLOOD PRESSURE: 111 MMHG | TEMPERATURE: 97.9 F | RESPIRATION RATE: 16 BRPM | HEART RATE: 81 BPM

## 2023-08-17 VITALS
HEIGHT: 64 IN | BODY MASS INDEX: 42.68 KG/M2 | RESPIRATION RATE: 14 BRPM | WEIGHT: 250 LBS | OXYGEN SATURATION: 99 % | HEART RATE: 89 BPM | SYSTOLIC BLOOD PRESSURE: 126 MMHG | DIASTOLIC BLOOD PRESSURE: 85 MMHG | TEMPERATURE: 98.7 F

## 2023-08-17 DIAGNOSIS — N93.9 VAGINAL BLEEDING: ICD-10-CM

## 2023-08-17 DIAGNOSIS — N92.1 BREAKTHROUGH BLEEDING ON DEPO-PROVERA: ICD-10-CM

## 2023-08-17 LAB
ALBUMIN UR-MCNC: 20 MG/DL
ALBUMIN UR-MCNC: 20 MG/DL
APPEARANCE UR: CLEAR
APPEARANCE UR: CLEAR
BILIRUB UR QL STRIP: NEGATIVE
BILIRUB UR QL STRIP: NEGATIVE
COLOR UR AUTO: ABNORMAL
COLOR UR AUTO: YELLOW
GLUCOSE UR STRIP-MCNC: NEGATIVE MG/DL
GLUCOSE UR STRIP-MCNC: NEGATIVE MG/DL
HCG UR QL: NEGATIVE
HGB UR QL STRIP: ABNORMAL
HGB UR QL STRIP: ABNORMAL
HYALINE CASTS: 1 /LPF
HYALINE CASTS: 1 /LPF
KETONES UR STRIP-MCNC: NEGATIVE MG/DL
KETONES UR STRIP-MCNC: NEGATIVE MG/DL
LEUKOCYTE ESTERASE UR QL STRIP: NEGATIVE
LEUKOCYTE ESTERASE UR QL STRIP: NEGATIVE
MUCOUS THREADS #/AREA URNS LPF: PRESENT /LPF
MUCOUS THREADS #/AREA URNS LPF: PRESENT /LPF
NITRATE UR QL: NEGATIVE
NITRATE UR QL: NEGATIVE
PH UR STRIP: 5.5 [PH] (ref 5–7)
PH UR STRIP: 6 [PH] (ref 5–7)
RBC URINE: 1 /HPF
RBC URINE: 31 /HPF
SP GR UR STRIP: 1.02 (ref 1–1.03)
SP GR UR STRIP: 1.03 (ref 1–1.03)
SQUAMOUS EPITHELIAL: 2 /HPF
SQUAMOUS EPITHELIAL: 2 /HPF
TRANSITIONAL EPI: <1 /HPF
UROBILINOGEN UR STRIP-MCNC: NORMAL MG/DL
UROBILINOGEN UR STRIP-MCNC: NORMAL MG/DL
WBC URINE: 1 /HPF
WBC URINE: 3 /HPF

## 2023-08-17 PROCEDURE — 250N000013 HC RX MED GY IP 250 OP 250 PS 637: Performed by: EMERGENCY MEDICINE

## 2023-08-17 PROCEDURE — 81001 URINALYSIS AUTO W/SCOPE: CPT | Performed by: EMERGENCY MEDICINE

## 2023-08-17 PROCEDURE — 99283 EMERGENCY DEPT VISIT LOW MDM: CPT | Mod: 27

## 2023-08-17 PROCEDURE — 99285 EMERGENCY DEPT VISIT HI MDM: CPT

## 2023-08-17 PROCEDURE — 99283 EMERGENCY DEPT VISIT LOW MDM: CPT | Performed by: EMERGENCY MEDICINE

## 2023-08-17 PROCEDURE — 99284 EMERGENCY DEPT VISIT MOD MDM: CPT | Performed by: EMERGENCY MEDICINE

## 2023-08-17 PROCEDURE — 81025 URINE PREGNANCY TEST: CPT | Performed by: EMERGENCY MEDICINE

## 2023-08-17 RX ORDER — OLANZAPINE 5 MG/1
5 TABLET, ORALLY DISINTEGRATING ORAL ONCE
Status: COMPLETED | OUTPATIENT
Start: 2023-08-17 | End: 2023-08-17

## 2023-08-17 RX ORDER — KETOROLAC TROMETHAMINE 15 MG/ML
10 INJECTION, SOLUTION INTRAMUSCULAR; INTRAVENOUS ONCE
Status: COMPLETED | OUTPATIENT
Start: 2023-08-17 | End: 2023-08-17

## 2023-08-17 RX ADMIN — OLANZAPINE 5 MG: 5 TABLET, ORALLY DISINTEGRATING ORAL at 15:01

## 2023-08-17 ASSESSMENT — ENCOUNTER SYMPTOMS: HEMATURIA: 1

## 2023-08-17 ASSESSMENT — ACTIVITIES OF DAILY LIVING (ADL)
ADLS_ACUITY_SCORE: 35
ADLS_ACUITY_SCORE: 37

## 2023-08-17 NOTE — ED NOTES
Pt refused IV with IV medication and DEC assessment, requesting to call for ride and return to group home.

## 2023-08-17 NOTE — ED TRIAGE NOTES
Patient was recently seen a couple hours ago in the emergency room for blood in urine. When asked; patient is unable to remember what the doctor told her.      Triage Assessment       Row Name 08/17/23 3100       Triage Assessment (Adult)    Airway WDL WDL       Respiratory WDL    Respiratory WDL WDL       Skin Circulation/Temperature WDL    Skin Circulation/Temperature WDL WDL       Cardiac WDL    Cardiac WDL WDL       Peripheral/Neurovascular WDL    Peripheral Neurovascular WDL WDL       Cognitive/Neuro/Behavioral WDL    Cognitive/Neuro/Behavioral WDL WDL

## 2023-08-17 NOTE — ED TRIAGE NOTES
Patient presents due vaginal bleeding. Patient reports getting the depo shot every 3 months. Patient is due for the depo shot on 8/30/23. Patient reports going out today and returning home to vaginal bleeding. Patient has not had a period for a couple years. Patient is concerned about vaginal bleeding; bleeding started 1130 today. Patient reports burning with urination        Triage Assessment       Row Name 08/17/23 1335       Triage Assessment (Adult)    Airway WDL WDL       Respiratory WDL    Respiratory WDL WDL       Skin Circulation/Temperature WDL    Skin Circulation/Temperature WDL WDL       Cardiac WDL    Cardiac WDL WDL       Peripheral/Neurovascular WDL    Peripheral Neurovascular WDL WDL       Cognitive/Neuro/Behavioral WDL    Cognitive/Neuro/Behavioral WDL WDL

## 2023-08-18 NOTE — ED PROVIDER NOTES
ED Provider Note  [facility]      History     Chief Complaint   Patient presents with    Vaginal Bleeding     Patient presents due vaginal bleeding. Patient reports getting the depo shot every 3 months. Patient is due for the depo shot on 8/30/23. Patient reports going out today and returning home to vaginal bleeding. Patient has not had a period for a couple years. Patient is concerned about vaginal bleeding; bleeding started 1130 today. Patient reports burning with urination.     HPI  Sadaf Ross is a 22 year old female who presents to the ED with a report of heavy vaginal bleeding since 1130 today and burning with urination. Has depot shot for contraception and is due for the next dose on 8/30. No F/C. No lightheadedness. Does not think she is pregnant. Also reporting SI, has frequent visits for SI with reassuring DEC assessments. Has been noted to be very situational.     Past Medical History  Past Medical History:   Diagnosis Date    ADHD (attention deficit hyperactivity disorder)     Bipolar 1 disorder (H)     Borderline personality disorder (H)     Depression     Depressive disorder     Intellectual disability     Obesity     Syncope      Past Surgical History:   Procedure Laterality Date    APPENDECTOMY      APPENDECTOMY       acetaminophen (TYLENOL) 325 MG tablet  albuterol (PROAIR HFA/PROVENTIL HFA/VENTOLIN HFA) 108 (90 Base) MCG/ACT inhaler  ARIPiprazole lauroxil ER (ARISTADA) 882 MG/3.2ML intra-muscular  bisacodyl (DULCOLAX) 5 MG EC tablet  calcium carbonate (TUMS) 500 MG chewable tablet  cyclobenzaprine (FLEXERIL) 10 MG tablet  dicyclomine (BENTYL) 20 MG tablet  docusate sodium (COLACE) 50 MG capsule  famotidine (PEPCID) 20 MG tablet  FLUoxetine (PROZAC) 40 MG capsule  hydrocortisone, Perianal, (HYDROCORTISONE) 2.5 % cream  hydrOXYzine (ATARAX) 50 MG tablet  LORazepam (ATIVAN) 0.5 MG tablet  mupirocin (BACTROBAN) 2 % external ointment  OLANZapine zydis (ZYPREXA) 5 MG  "ODT  ondansetron (ZOFRAN) 4 MG tablet  ondansetron (ZOFRAN) 4 MG tablet  pantoprazole (PROTONIX) 40 MG EC tablet  polyethylene glycol (MIRALAX) 17 GM/Dose powder  promethazine (PHENERGAN) 12.5 MG tablet  sennosides (SENOKOT) 8.6 MG tablet  traZODone (DESYREL) 50 MG tablet  Vitamin D, Cholecalciferol, 25 MCG (1000 UT) TABS      Allergies   Allergen Reactions    Penicillins Rash and Unknown     Family History  Family History   Problem Relation Age of Onset    Diabetes Type 1 Father     Cancer Paternal Grandfather      Social History   Social History     Tobacco Use    Smoking status: Every Day     Packs/day: 3.00     Years: 5.00     Pack years: 15.00     Types: Cigarettes    Smokeless tobacco: Never   Substance Use Topics    Alcohol use: No    Drug use: No         A medically appropriate review of systems was performed with pertinent positives and negatives noted in the HPI, and all other systems negative.    Physical Exam   BP: 126/85  Pulse: 89  Temp: 98.7  F (37.1  C)  Resp: 14  Height: 162.6 cm (5' 4\")  Weight: 113.4 kg (250 lb)  SpO2: 99 %  Physical Exam  Constitutional:       General: She is awake. She is not in acute distress.     Appearance: Normal appearance. She is well-developed. She is not ill-appearing or toxic-appearing.   HENT:      Head: Atraumatic.      Mouth/Throat:      Pharynx: No oropharyngeal exudate.   Eyes:      General: No scleral icterus.     Pupils: Pupils are equal, round, and reactive to light.   Cardiovascular:      Rate and Rhythm: Normal rate and regular rhythm.      Heart sounds: Normal heart sounds, S1 normal and S2 normal.   Pulmonary:      Effort: No respiratory distress.      Breath sounds: Normal breath sounds.   Abdominal:      General: Bowel sounds are normal.      Palpations: Abdomen is soft.      Tenderness: There is no abdominal tenderness.   Musculoskeletal:         General: No tenderness.   Skin:     General: Skin is warm.      Findings: No rash.   Neurological:      Mental " Status: She is alert and oriented to person, place, and time.   Psychiatric:         Attention and Perception: Attention normal.         Mood and Affect: Mood is anxious.         Speech: Speech normal.         Behavior: Behavior normal. Behavior is cooperative.         Thought Content: Thought content includes suicidal ideation.           ED Course, Procedures, & Data      Procedures          Mental Health Risk Assessment        PSS-3      Date and Time Over the past 2 weeks have you felt down, depressed, or hopeless? Over the past 2 weeks have you had thoughts of killing yourself? Have you ever attempted to kill yourself? When did this last happen? User   08/17/23 1336 yes yes yes within the last month (but not today) TMW          C-SSRS ([place])      Date and Time Q1 Wished to be Dead (Past Month) Q2 Suicidal Thoughts (Past Month) Q3 Suicidal Thought Method Q4 Suicidal Intent without Specific Plan Q5 Suicide Intent with Specific Plan Q6 Suicide Behavior (Lifetime) Within the Past 3 Months? RETIRED: Level of Risk per Screen Screening Not Complete User   08/17/23 1336 yes yes yes no no yes -- -- -- TMW                  Item Assessment   Suicidal Ideation Suicidal thoughts   Plan None   Intent Situational   Suicidal or self-harm behaviors None   Risk Factors Previous psychiatric diagnosis and treatments and resides at a group home   Protective Factors Future oriented, wants to return to group          Results for orders placed or performed during the hospital encounter of 08/17/23   UA with Microscopic reflex to Culture     Status: Abnormal    Specimen: Urine, Midstream   Result Value Ref Range    Color Urine Light Yellow Colorless, Straw, Light Yellow, Yellow    Appearance Urine Clear Clear    Glucose Urine Negative Negative mg/dL    Bilirubin Urine Negative Negative    Ketones Urine Negative Negative mg/dL    Specific Gravity Urine 1.020 1.003 - 1.035    Blood Urine Small (A) Negative    pH Urine 6.0 5.0 - 7.0     Protein Albumin Urine 20 (A) Negative mg/dL    Urobilinogen Urine Normal Normal, 2.0 mg/dL    Nitrite Urine Negative Negative    Leukocyte Esterase Urine Negative Negative    Mucus Urine Present (A) None Seen /LPF    RBC Urine 1 <=2 /HPF    WBC Urine 3 <=5 /HPF    Squamous Epithelials Urine 2 (H) <=1 /HPF    Transitional Epithelials Urine <1 <=1 /HPF    Hyaline Casts Urine 1 <=2 /LPF    Narrative    Urine Culture not indicated   HCG qualitative urine     Status: Normal   Result Value Ref Range    hCG Urine Qualitative Negative Negative   Results for orders placed or performed during the hospital encounter of 08/17/23   UA with Microscopic reflex to Culture     Status: Abnormal    Specimen: Urine, Midstream   Result Value Ref Range    Color Urine Yellow Colorless, Straw, Light Yellow, Yellow    Appearance Urine Clear Clear    Glucose Urine Negative Negative mg/dL    Bilirubin Urine Negative Negative    Ketones Urine Negative Negative mg/dL    Specific Gravity Urine 1.029 1.003 - 1.035    Blood Urine Large (A) Negative    pH Urine 5.5 5.0 - 7.0    Protein Albumin Urine 20 (A) Negative mg/dL    Urobilinogen Urine Normal Normal, 2.0 mg/dL    Nitrite Urine Negative Negative    Leukocyte Esterase Urine Negative Negative    Mucus Urine Present (A) None Seen /LPF    RBC Urine 31 (H) <=2 /HPF    WBC Urine 1 <=5 /HPF    Squamous Epithelials Urine 2 (H) <=1 /HPF    Hyaline Casts Urine 1 <=2 /LPF    Narrative    Urine Culture not indicated     Medications   ketorolac (TORADOL) injection 10 mg (10 mg Intravenous Not Given 8/17/23 1501)   OLANZapine zydis (zyPREXA) ODT tab 5 mg (5 mg Oral $Given 8/17/23 1501)     Labs Ordered and Resulted from Time of ED Arrival to Time of ED Departure   ROUTINE UA WITH MICROSCOPIC REFLEX TO CULTURE - Abnormal       Result Value    Color Urine Yellow      Appearance Urine Clear      Glucose Urine Negative      Bilirubin Urine Negative      Ketones Urine Negative      Specific Gravity Urine 1.029       Blood Urine Large (*)     pH Urine 5.5      Protein Albumin Urine 20 (*)     Urobilinogen Urine Normal      Nitrite Urine Negative      Leukocyte Esterase Urine Negative      Mucus Urine Present (*)     RBC Urine 31 (*)     WBC Urine 1      Squamous Epithelials Urine 2 (*)     Hyaline Casts Urine 1       No orders to display          Critical care was not performed.     Medical Decision Making  The patient's presentation was of moderate complexity (an acute illness with systemic symptoms).    The patient's evaluation involved:  review of external note(s) from 1 sources (ED)  ordering and/or review of 3+ test(s) in this encounter (see separate area of note for details)  strong consideration of a test (pelvic U/S) that was ultimately deferred    The patient's management necessitated moderate risk (limitations due to social determinants of health (resides at group home with intellectual diability)).    Assessment & Plan    Sadaf Ross is a 22 year old female who presents to the ED with a report of heavy vaginal bleeding since 1130 today and burning with urination. Suspect break through bleeding with low suspicion for grave etiology. Initiated work up but pt is a difficult blood draw. While awaiting staff for U/S IV, pt refused further work up and would like to leave. Denies any further bleeding. Feels well currently after received a dose of zyprexa as she appeared anxious. Given the very brief period of vaginal bleeding and her general well appearance, will hold off on further labs or imaging at this time and discharge with recommendation for outpt follow up.     I have reviewed the nursing notes. I have reviewed the findings, diagnosis, plan and need for follow up with the patient.    Discharge Medication List as of 8/17/2023  3:21 PM          Final diagnoses:   Vaginal bleeding       Fred Jeronimo MD PhD  [facility] EMERGENCY DEPARTMENT  8/17/2023     Pacheco Jeronimo MD  08/17/23 [id number]

## 2023-08-18 NOTE — ED PROVIDER NOTES
[facility] EMERGENCY DEPARTMENT ([facility])    8/17/23      ED PROVIDER NOTE   Harry TAPIA   History     Chief Complaint   Patient presents with    Hematuria     Pt was just discharged. BIBA per report pt has the same symptoms re: blood in urine and painful urination     The history is provided by the patient and medical records.     Sadaf Ross is a 22 year old female who is a frequent user and abuser of the emergency room who presents to the Emergency Department with complaints of some dysuria with gross hematuria, thinking that she might have a bladder infection but also stating that she has some vaginal bleeding.  Patient states that she normally gets a Depo shot every 3 months and her last shot was 1 month ago.  Patient states that she has no pain except for some dysuria but states that she is also suicidal which is her usual MO.  The patient was seen earlier and had a urine specimen done which revealed no evidence of infection but no ED physician note was completed.     This part of the document was transcribed by Larisa Grant, Medical Scribe.        I have reviewed the Medications, Allergies, Past Medical and Surgical History, and Social History in the [employer] system.  Past Medical History:   Diagnosis Date    ADHD (attention deficit hyperactivity disorder)     Bipolar 1 disorder (H)     Borderline personality disorder (H)     Depression     Depressive disorder     Intellectual disability     Obesity     Syncope        Past Surgical History:   Procedure Laterality Date    APPENDECTOMY      APPENDECTOMY             Dose / Directions   acetaminophen 325 MG tablet  Commonly known as: TYLENOL      Dose: 650 mg  Take 650 mg by mouth every 6 hours as needed for mild pain  Refills: 0     albuterol 108 (90 Base) MCG/ACT inhaler  Commonly known as: PROAIR HFA/PROVENTIL HFA/VENTOLIN HFA      Dose: 2 puff  Inhale 2 puffs into the lungs every 6 hours as needed for shortness of breath, wheezing or  cough  Quantity: 18 g  Refills: 0     ARIPiprazole lauroxil  MG/3.2ML intra-muscular  Commonly known as: ARISTADA      Dose: 882 mg  Inject 882 mg into the muscle every 28 days On the 22nd of each month  Refills: 0     bisacodyl 5 MG EC tablet  Commonly known as: DULCOLAX      Dose: 5 mg  Take 1 tablet (5 mg) by mouth daily as needed for constipation  Quantity: 30 tablet  Refills: 0     calcium carbonate 500 MG chewable tablet  Commonly known as: TUMS      Dose: 1 chew tab  Take 1 chew tab by mouth 2 times daily as needed for heartburn  Refills: 0     cyclobenzaprine 10 MG tablet  Commonly known as: FLEXERIL      Dose: 10 mg  Take 10 mg by mouth 3 times daily as needed for muscle spasms  Refills: 0     dicyclomine 20 MG tablet  Commonly known as: BENTYL      Dose: 20 mg  Take 20 mg by mouth 2 times daily as needed (dyspepsia)  Refills: 0     docusate sodium 50 MG capsule  Commonly known as: COLACE      Dose: 100 mg  Take 100 mg by mouth 2 times daily  Refills: 0     famotidine 20 MG tablet  Commonly known as: PEPCID      Dose: 20 mg  Take 20 mg by mouth daily  Refills: 0     FLUoxetine 40 MG capsule  Commonly known as: PROzac      Dose: 80 mg  Take 80 mg by mouth daily  Refills: 0     hydrocortisone (Perianal) 2.5 % cream  Commonly known as: hydrocortisone      Place rectally 2 times daily as needed for hemorrhoids  Quantity: 30 g  Refills: 0     hydrOXYzine 50 MG tablet  Commonly known as: ATARAX      Dose: 50 mg  Take 50 mg by mouth 2 times daily as needed for anxiety  Refills: 0     LORazepam 0.5 MG tablet  Commonly known as: ATIVAN      Dose: 0.5 mg  Take 0.5 mg by mouth once as needed for anxiety Give as needed any time she has the urge to call 911 or to visit the ER  Refills: 0     mupirocin 2 % external ointment  Commonly known as: BACTROBAN      Apply topically 3 times daily  Quantity: 15 g  Refills: 0     OLANZapine zydis 5 MG ODT  Commonly known as: zyPREXA      Dose: 5 mg  Take 1 tablet (5 mg) by  mouth At Bedtime  Quantity: 30 tablet  Refills: 0     * ondansetron 4 MG tablet  Commonly known as: ZOFRAN  Indication: Nausea and Vomiting      Dose: 4 mg  Take 4 mg by mouth every 8 hours as needed for nausea  Refills: 0     * ondansetron 4 MG tablet  Commonly known as: Zofran      Dose: 4 mg  Take 1 tablet (4 mg) by mouth every 8 hours as needed  Quantity: 15 tablet  Refills: 0     pantoprazole 40 MG EC tablet  Commonly known as: PROTONIX      Dose: 40 mg  Take 40 mg by mouth daily  Refills: 0     polyethylene glycol 17 GM/Dose powder  Commonly known as: MIRALAX      Dose: 17 g  Take 17 g by mouth daily  Refills: 0     promethazine 12.5 MG tablet  Commonly known as: PHENERGAN      Dose: 12.5 mg  Take 12.5 mg by mouth every 4 hours  Refills: 0     sennosides 8.6 MG tablet  Commonly known as: SENOKOT      Dose: 1 tablet  Take 1 tablet by mouth 2 times daily as needed for constipation  Refills: 0     traZODone 50 MG tablet  Commonly known as: DESYREL      Dose: 100 mg  Take 100 mg by mouth At Bedtime  Refills: 0     Vitamin D3 25 mcg (1000 units) tablet  Commonly known as: CHOLECALCIFEROL      Dose: 1,000 Units  Take 1,000 Units by mouth daily  Refills: 0             Past medical history, past surgical history, medications, and allergies were reviewed with the patient. Additional pertinent items: None    Family History   Problem Relation Age of Onset    Diabetes Type 1 Father     Cancer Paternal Grandfather        Social History     Tobacco Use    Smoking status: Every Day     Packs/day: 3.00     Years: 5.00     Pack years: 15.00     Types: Cigarettes    Smokeless tobacco: Never   Substance Use Topics    Alcohol use: No     Social history was reviewed with the patient. Additional pertinent items: None    Allergies   Allergen Reactions    Penicillins Rash and Unknown       Review of Systems   Genitourinary:  Positive for hematuria.     A complete review of systems was performed with pertinent positives and negatives  noted in the HPI, and all other systems negative.    Physical Exam   BP: 122/86  Pulse: 94  Temp: 98.5  F (36.9  C)  Resp: 16  SpO2: 100 %      Physical Exam    ED Course        Procedures             Results for orders placed or performed during the hospital encounter of 08/17/23 (from the past 24 hour(s))   UA with Microscopic reflex to Culture    Specimen: Urine, Midstream   Result Value Ref Range    Color Urine Light Yellow Colorless, Straw, Light Yellow, Yellow    Appearance Urine Clear Clear    Glucose Urine Negative Negative mg/dL    Bilirubin Urine Negative Negative    Ketones Urine Negative Negative mg/dL    Specific Gravity Urine 1.020 1.003 - 1.035    Blood Urine Small (A) Negative    pH Urine 6.0 5.0 - 7.0    Protein Albumin Urine 20 (A) Negative mg/dL    Urobilinogen Urine Normal Normal, 2.0 mg/dL    Nitrite Urine Negative Negative    Leukocyte Esterase Urine Negative Negative    Mucus Urine Present (A) None Seen /LPF    RBC Urine 1 <=2 /HPF    WBC Urine 3 <=5 /HPF    Squamous Epithelials Urine 2 (H) <=1 /HPF    Transitional Epithelials Urine <1 <=1 /HPF    Hyaline Casts Urine 1 <=2 /LPF    Narrative    Urine Culture not indicated   HCG qualitative urine   Result Value Ref Range    hCG Urine Qualitative Negative Negative       Critical care was not performed.     Medical Decision Making  The patient's presentation was of low complexity (an acute and uncomplicated illness or injury).    The patient's evaluation involved:  ordering and/or review of 2 test(s) in this encounter (see above)    The patient's management necessitated only low risk treatment.    Behavioral assessment was requested but the patient is well-known to the behavioral medicine staff that they felt a behavioral assessment was not necessary at this time    Assessments & Plan (with Medical Decision Making)     I have reviewed the nursing notes.    Medications - No data to display     I have reviewed the findings, diagnosis, plan and need  for follow up with the patient.    New Prescriptions    No medications on file       Final diagnoses:   Breakthrough bleeding on Depo-Provera     Please follow up with Your Primary Care Provider in one week for recheck.      HEATHER CANCHOLA MD    8/17/2023   [facility] EMERGENCY DEPARTMENT       Heather Canchola MD  08/17/23 [id number]

## 2023-08-19 ENCOUNTER — HOSPITAL ENCOUNTER (EMERGENCY)
Facility: CLINIC | Age: 23
Discharge: HOME | End: 2023-08-19
Attending: STUDENT IN AN ORGANIZED HEALTH CARE EDUCATION/TRAINING PROGRAM | Admitting: STUDENT IN AN ORGANIZED HEALTH CARE EDUCATION/TRAINING PROGRAM
Payer: MEDICARE

## 2023-08-19 VITALS
SYSTOLIC BLOOD PRESSURE: 126 MMHG | OXYGEN SATURATION: 99 % | DIASTOLIC BLOOD PRESSURE: 84 MMHG | RESPIRATION RATE: 18 BRPM | HEART RATE: 98 BPM | TEMPERATURE: 98.5 F

## 2023-08-19 DIAGNOSIS — R11.2 NAUSEA AND VOMITING, UNSPECIFIED VOMITING TYPE: ICD-10-CM

## 2023-08-19 LAB
ALBUMIN SERPL BCG-MCNC: 4.7 G/DL (ref 3.5–5.2)
ALBUMIN UR-MCNC: 20 MG/DL
ALP SERPL-CCNC: 71 U/L (ref 35–104)
ALT SERPL W P-5'-P-CCNC: 19 U/L (ref 0–50)
ANION GAP SERPL CALCULATED.3IONS-SCNC: 10 MMOL/L (ref 7–15)
APPEARANCE UR: CLEAR
AST SERPL W P-5'-P-CCNC: 16 U/L (ref 0–45)
BASOPHILS # BLD AUTO: 0 10E3/UL (ref 0–0.2)
BASOPHILS NFR BLD AUTO: 0 %
BILIRUB SERPL-MCNC: 0.2 MG/DL
BILIRUB UR QL STRIP: NEGATIVE
BUN SERPL-MCNC: 12.2 MG/DL (ref 6–20)
CALCIUM SERPL-MCNC: 9.3 MG/DL (ref 8.6–10)
CHLORIDE SERPL-SCNC: 106 MMOL/L (ref 98–107)
COLOR UR AUTO: YELLOW
CREAT SERPL-MCNC: 0.93 MG/DL (ref 0.51–0.95)
DEPRECATED HCO3 PLAS-SCNC: 24 MMOL/L (ref 22–29)
EOSINOPHIL # BLD AUTO: 0.2 10E3/UL (ref 0–0.7)
EOSINOPHIL NFR BLD AUTO: 2 %
ERYTHROCYTE [DISTWIDTH] IN BLOOD BY AUTOMATED COUNT: 12.9 % (ref 10–15)
GFR SERPL CREATININE-BSD FRML MDRD: 88 ML/MIN/1.73M2
GLUCOSE SERPL-MCNC: 80 MG/DL (ref 70–99)
GLUCOSE UR STRIP-MCNC: NEGATIVE MG/DL
HCG SERPL QL: NEGATIVE
HCT VFR BLD AUTO: 35.3 % (ref 35–47)
HGB BLD-MCNC: 12.4 G/DL (ref 11.7–15.7)
HGB UR QL STRIP: ABNORMAL
HYALINE CASTS: 1 /LPF
IMM GRANULOCYTES # BLD: 0 10E3/UL
IMM GRANULOCYTES NFR BLD: 0 %
KETONES UR STRIP-MCNC: NEGATIVE MG/DL
LACTATE SERPL-SCNC: 0.7 MMOL/L (ref 0.7–2)
LEUKOCYTE ESTERASE UR QL STRIP: NEGATIVE
LYMPHOCYTES # BLD AUTO: 2.9 10E3/UL (ref 0.8–5.3)
LYMPHOCYTES NFR BLD AUTO: 31 %
MAGNESIUM SERPL-MCNC: 2.1 MG/DL (ref 1.7–2.3)
MCH RBC QN AUTO: 28.9 PG (ref 26.5–33)
MCHC RBC AUTO-ENTMCNC: 35.1 G/DL (ref 31.5–36.5)
MCV RBC AUTO: 82 FL (ref 78–100)
MONOCYTES # BLD AUTO: 0.5 10E3/UL (ref 0–1.3)
MONOCYTES NFR BLD AUTO: 5 %
MUCOUS THREADS #/AREA URNS LPF: PRESENT /LPF
NEUTROPHILS # BLD AUTO: 5.7 10E3/UL (ref 1.6–8.3)
NEUTROPHILS NFR BLD AUTO: 62 %
NITRATE UR QL: NEGATIVE
NRBC # BLD AUTO: 0 10E3/UL
NRBC BLD AUTO-RTO: 0 /100
PH UR STRIP: 5.5 [PH] (ref 5–7)
PLATELET # BLD AUTO: 227 10E3/UL (ref 150–450)
POTASSIUM SERPL-SCNC: 3.8 MMOL/L (ref 3.4–5.3)
PROT SERPL-MCNC: 7.3 G/DL (ref 6.4–8.3)
RBC # BLD AUTO: 4.29 10E6/UL (ref 3.8–5.2)
RBC URINE: 1 /HPF
SODIUM SERPL-SCNC: 140 MMOL/L (ref 136–145)
SP GR UR STRIP: 1.03 (ref 1–1.03)
SQUAMOUS EPITHELIAL: 2 /HPF
UROBILINOGEN UR STRIP-MCNC: NORMAL MG/DL
WBC # BLD AUTO: 9.4 10E3/UL (ref 4–11)
WBC URINE: 2 /HPF

## 2023-08-19 PROCEDURE — 83735 ASSAY OF MAGNESIUM: CPT | Performed by: STUDENT IN AN ORGANIZED HEALTH CARE EDUCATION/TRAINING PROGRAM

## 2023-08-19 PROCEDURE — 258N000003 HC RX IP 258 OP 636: Performed by: STUDENT IN AN ORGANIZED HEALTH CARE EDUCATION/TRAINING PROGRAM

## 2023-08-19 PROCEDURE — 99284 EMERGENCY DEPT VISIT MOD MDM: CPT | Mod: 25 | Performed by: STUDENT IN AN ORGANIZED HEALTH CARE EDUCATION/TRAINING PROGRAM

## 2023-08-19 PROCEDURE — 36415 COLL VENOUS BLD VENIPUNCTURE: CPT | Performed by: STUDENT IN AN ORGANIZED HEALTH CARE EDUCATION/TRAINING PROGRAM

## 2023-08-19 PROCEDURE — 99284 EMERGENCY DEPT VISIT MOD MDM: CPT | Performed by: STUDENT IN AN ORGANIZED HEALTH CARE EDUCATION/TRAINING PROGRAM

## 2023-08-19 PROCEDURE — 81001 URINALYSIS AUTO W/SCOPE: CPT | Performed by: STUDENT IN AN ORGANIZED HEALTH CARE EDUCATION/TRAINING PROGRAM

## 2023-08-19 PROCEDURE — 80053 COMPREHEN METABOLIC PANEL: CPT | Performed by: STUDENT IN AN ORGANIZED HEALTH CARE EDUCATION/TRAINING PROGRAM

## 2023-08-19 PROCEDURE — 84703 CHORIONIC GONADOTROPIN ASSAY: CPT | Performed by: STUDENT IN AN ORGANIZED HEALTH CARE EDUCATION/TRAINING PROGRAM

## 2023-08-19 PROCEDURE — 85025 COMPLETE CBC W/AUTO DIFF WBC: CPT | Performed by: STUDENT IN AN ORGANIZED HEALTH CARE EDUCATION/TRAINING PROGRAM

## 2023-08-19 PROCEDURE — 96361 HYDRATE IV INFUSION ADD-ON: CPT | Performed by: STUDENT IN AN ORGANIZED HEALTH CARE EDUCATION/TRAINING PROGRAM

## 2023-08-19 PROCEDURE — 96374 THER/PROPH/DIAG INJ IV PUSH: CPT | Performed by: STUDENT IN AN ORGANIZED HEALTH CARE EDUCATION/TRAINING PROGRAM

## 2023-08-19 PROCEDURE — 250N000011 HC RX IP 250 OP 636: Mod: JZ | Performed by: STUDENT IN AN ORGANIZED HEALTH CARE EDUCATION/TRAINING PROGRAM

## 2023-08-19 PROCEDURE — 83605 ASSAY OF LACTIC ACID: CPT | Performed by: STUDENT IN AN ORGANIZED HEALTH CARE EDUCATION/TRAINING PROGRAM

## 2023-08-19 RX ORDER — ONDANSETRON 2 MG/ML
8 INJECTION INTRAMUSCULAR; INTRAVENOUS EVERY 30 MIN PRN
Status: DISCONTINUED | OUTPATIENT
Start: 2023-08-19 | End: 2023-08-19 | Stop reason: HOSPADM

## 2023-08-19 RX ADMIN — SODIUM CHLORIDE, POTASSIUM CHLORIDE, SODIUM LACTATE AND CALCIUM CHLORIDE 1000 ML: 600; 310; 30; 20 INJECTION, SOLUTION INTRAVENOUS at 18:15

## 2023-08-19 RX ADMIN — ONDANSETRON 8 MG: 2 INJECTION INTRAMUSCULAR; INTRAVENOUS at 18:16

## 2023-08-19 ASSESSMENT — ACTIVITIES OF DAILY LIVING (ADL): ADLS_ACUITY_SCORE: 37

## 2023-08-19 NOTE — ED NOTES
Writer called to inform the  that the pt is in the ED.  staff stated that they are noticing a pattern that after Sadaf's weekly outing on Saturdays to go shopping she will come home and trigger her gag reflex by putting her finger down her throat to make herself throw-up and then call EMS to go to the ED. Care team updated.

## 2023-08-19 NOTE — ED PROVIDER NOTES
[facility] EMERGENCY DEPARTMENT ([facility])    8/19/23          History     Chief Complaint   Patient presents with    Back Pain    Abdominal Pain    Vomiting     The history is provided by the patient and medical records.     Sadaf Ross is a 22 year old female who has multiple ED visits this month and is a frequent user of the emergency room who presents to the Emergency Department with back pain, abdominal pain and vomiting.   Patient reports that she went shopping at [employer] and she started vomiting about 6 times. Patient reports that it was a very hot day and it felt like there was a heat exhaustion.  She reports she has not eaten anything.  She did have nausea and vomiting before but nothing severe like this. Also, she states that she has been urinating a whole lot more than usual.   Patient has a history of appendectomy at 11 years old.  Per Epic Review:   Patient was presented to the ED on 8/17/23 with a report of heavy vaginal bleeding and burning with urination. Initiated work up but patient was difficult in blood drawing. While awaiting staff for U/S IV, pt refused further work up and wanted to leave. Felt better after receiving a dose of zyprexa as she appeared anxious. Given the very brief period of vaginal bleeding and her general well appearance, labs or imaging were held off and was discharged with recommendation for outpt follow up.      Past Medical History  Past Medical History:   Diagnosis Date    ADHD (attention deficit hyperactivity disorder)     Bipolar 1 disorder (H)     Borderline personality disorder (H)     Depression     Depressive disorder     Intellectual disability     Obesity     Syncope      Past Surgical History:   Procedure Laterality Date    APPENDECTOMY      APPENDECTOMY       acetaminophen (TYLENOL) 325 MG tablet  albuterol (PROAIR HFA/PROVENTIL HFA/VENTOLIN HFA) 108 (90 Base) MCG/ACT inhaler  ARIPiprazole lauroxil ER (ARISTADA) 882 MG/3.2ML  intra-muscular  bisacodyl (DULCOLAX) 5 MG EC tablet  calcium carbonate (TUMS) 500 MG chewable tablet  cyclobenzaprine (FLEXERIL) 10 MG tablet  dicyclomine (BENTYL) 20 MG tablet  docusate sodium (COLACE) 50 MG capsule  famotidine (PEPCID) 20 MG tablet  FLUoxetine (PROZAC) 40 MG capsule  hydrocortisone, Perianal, (HYDROCORTISONE) 2.5 % cream  hydrOXYzine (ATARAX) 50 MG tablet  LORazepam (ATIVAN) 0.5 MG tablet  mupirocin (BACTROBAN) 2 % external ointment  OLANZapine zydis (ZYPREXA) 5 MG ODT  ondansetron (ZOFRAN) 4 MG tablet  ondansetron (ZOFRAN) 4 MG tablet  pantoprazole (PROTONIX) 40 MG EC tablet  polyethylene glycol (MIRALAX) 17 GM/Dose powder  promethazine (PHENERGAN) 12.5 MG tablet  sennosides (SENOKOT) 8.6 MG tablet  traZODone (DESYREL) 50 MG tablet  Vitamin D, Cholecalciferol, 25 MCG (1000 UT) TABS      Allergies   Allergen Reactions    Penicillins Rash and Unknown     Family History  Family History   Problem Relation Age of Onset    Diabetes Type 1 Father     Cancer Paternal Grandfather      Social History   Social History     Tobacco Use    Smoking status: Every Day     Packs/day: 3.00     Years: 5.00     Pack years: 15.00     Types: Cigarettes    Smokeless tobacco: Never   Substance Use Topics    Alcohol use: No    Drug use: No      Past medical history, past surgical history, medications, allergies, family history, and social history were reviewed with the patient. No additional pertinent items.      A complete review of systems was performed with pertinent positives and negatives noted in the HPI, and all other systems negative.    Physical Exam   BP: 126/84  Pulse: 98  Temp: 98.5  F (36.9  C)  Resp: 18  SpO2: 99 %  Physical Exam  Vital Signs Reviewed  Gen: Well nourished, well developed, resting comfortably, no acute distress  HEENT: NC/AT, PERRL, EOMI, MMM  Neck: Supple, FROM  CV: Regular Rate, no murmur/rub/gallop  Lungs/Chest: Normal Effort, CTAB  Abd: Non-distended, non-tender, no  rigidity/rebound/guarding  MSK/Back: FROM, no visible deformity  Neuro: A&Ox3, GCS 15, CN II-XII unremarkable  Skin: Warm, Dry, Intact, no visible lesions    ED Course, Procedures, & Data   5:49 PM  The patient was seen and examined by German Rivera MD. in [facility].   ED Course as of 08/19/23 1851   Sat Aug 19, 2023   1845 Patient states she is feeling better.  Would like to go home.  Labs reviewed and reassuring.       Procedures               Results for orders placed or performed during the hospital encounter of 08/19/23   Comprehensive metabolic panel     Status: Normal   Result Value Ref Range    Sodium 140 136 - 145 mmol/L    Potassium 3.8 3.4 - 5.3 mmol/L    Chloride 106 98 - 107 mmol/L    Carbon Dioxide (CO2) 24 22 - 29 mmol/L    Anion Gap 10 7 - 15 mmol/L    Urea Nitrogen 12.2 6.0 - 20.0 mg/dL    Creatinine 0.93 0.51 - 0.95 mg/dL    Calcium 9.3 8.6 - 10.0 mg/dL    Glucose 80 70 - 99 mg/dL    Alkaline Phosphatase 71 35 - 104 U/L    AST 16 0 - 45 U/L    ALT 19 0 - 50 U/L    Protein Total 7.3 6.4 - 8.3 g/dL    Albumin 4.7 3.5 - 5.2 g/dL    Bilirubin Total 0.2 <=1.2 mg/dL    GFR Estimate 88 >60 mL/min/1.73m2   Lactic acid whole blood     Status: Normal   Result Value Ref Range    Lactic Acid 0.7 0.7 - 2.0 mmol/L   Magnesium     Status: Normal   Result Value Ref Range    Magnesium 2.1 1.7 - 2.3 mg/dL   HCG qualitative Blood     Status: Normal   Result Value Ref Range    hCG Serum Qualitative Negative Negative   UA with Microscopic reflex to Culture     Status: Abnormal    Specimen: Urine, Clean Catch   Result Value Ref Range    Color Urine Yellow Colorless, Straw, Light Yellow, Yellow    Appearance Urine Clear Clear    Glucose Urine Negative Negative mg/dL    Bilirubin Urine Negative Negative    Ketones Urine Negative Negative mg/dL    Specific Gravity Urine 1.034 1.003 - 1.035    Blood Urine Trace (A) Negative    pH Urine 5.5 5.0 - 7.0    Protein Albumin Urine 20 (A) Negative mg/dL    Urobilinogen Urine  Normal Normal, 2.0 mg/dL    Nitrite Urine Negative Negative    Leukocyte Esterase Urine Negative Negative    Mucus Urine Present (A) None Seen /LPF    RBC Urine 1 <=2 /HPF    WBC Urine 2 <=5 /HPF    Squamous Epithelials Urine 2 (H) <=1 /HPF    Hyaline Casts Urine 1 <=2 /LPF    Narrative    Urine Culture not indicated   CBC with platelets and differential     Status: None   Result Value Ref Range    WBC Count 9.4 4.0 - 11.0 10e3/uL    RBC Count 4.29 3.80 - 5.20 10e6/uL    Hemoglobin 12.4 11.7 - 15.7 g/dL    Hematocrit 35.3 35.0 - 47.0 %    MCV 82 78 - 100 fL    MCH 28.9 26.5 - 33.0 pg    MCHC 35.1 31.5 - 36.5 g/dL    RDW 12.9 10.0 - 15.0 %    Platelet Count 227 150 - 450 10e3/uL    % Neutrophils 62 %    % Lymphocytes 31 %    % Monocytes 5 %    % Eosinophils 2 %    % Basophils 0 %    % Immature Granulocytes 0 %    NRBCs per 100 WBC 0 <1 /100    Absolute Neutrophils 5.7 1.6 - 8.3 10e3/uL    Absolute Lymphocytes 2.9 0.8 - 5.3 10e3/uL    Absolute Monocytes 0.5 0.0 - 1.3 10e3/uL    Absolute Eosinophils 0.2 0.0 - 0.7 10e3/uL    Absolute Basophils 0.0 0.0 - 0.2 10e3/uL    Absolute Immature Granulocytes 0.0 <=0.4 10e3/uL    Absolute NRBCs 0.0 10e3/uL   CBC with platelets differential     Status: None    Narrative    The following orders were created for panel order CBC with platelets differential.  Procedure                               Abnormality         Status                     ---------                               -----------         ------                     CBC with platelets and d...[[id number]]                      Final result                 Please view results for these tests on the individual orders.     Medications   lactated ringers BOLUS 2,000 mL (1,000 mLs Intravenous $New Bag 8/19/23 1815)   ondansetron (ZOFRAN) injection 8 mg (8 mg Intravenous $Given 8/19/23 1816)     Labs Ordered and Resulted from Time of ED Arrival to Time of ED Departure   ROUTINE UA WITH MICROSCOPIC REFLEX TO CULTURE - Abnormal        Result Value    Color Urine Yellow      Appearance Urine Clear      Glucose Urine Negative      Bilirubin Urine Negative      Ketones Urine Negative      Specific Gravity Urine 1.034      Blood Urine Trace (*)     pH Urine 5.5      Protein Albumin Urine 20 (*)     Urobilinogen Urine Normal      Nitrite Urine Negative      Leukocyte Esterase Urine Negative      Mucus Urine Present (*)     RBC Urine 1      WBC Urine 2      Squamous Epithelials Urine 2 (*)     Hyaline Casts Urine 1     COMPREHENSIVE METABOLIC PANEL - Normal    Sodium 140      Potassium 3.8      Chloride 106      Carbon Dioxide (CO2) 24      Anion Gap 10      Urea Nitrogen 12.2      Creatinine 0.93      Calcium 9.3      Glucose 80      Alkaline Phosphatase 71      AST 16      ALT 19      Protein Total 7.3      Albumin 4.7      Bilirubin Total 0.2      GFR Estimate 88     LACTIC ACID WHOLE BLOOD - Normal    Lactic Acid 0.7     MAGNESIUM - Normal    Magnesium 2.1     HCG QUALITATIVE PREGNANCY - Normal    hCG Serum Qualitative Negative     CBC WITH PLATELETS AND DIFFERENTIAL    WBC Count 9.4      RBC Count 4.29      Hemoglobin 12.4      Hematocrit 35.3      MCV 82      MCH 28.9      MCHC 35.1      RDW 12.9      Platelet Count 227      % Neutrophils 62      % Lymphocytes 31      % Monocytes 5      % Eosinophils 2      % Basophils 0      % Immature Granulocytes 0      NRBCs per 100 WBC 0      Absolute Neutrophils 5.7      Absolute Lymphocytes 2.9      Absolute Monocytes 0.5      Absolute Eosinophils 0.2      Absolute Basophils 0.0      Absolute Immature Granulocytes 0.0      Absolute NRBCs 0.0       No orders to display          Critical care was not performed.     Medical Decision Making  The patient's presentation was of high complexity (an acute health issue posing potential threat to life or bodily function).    The patient's evaluation involved:  ordering and/or review of 3+ test(s) in this encounter (see separate area of note for details)    The  patient's management necessitated moderate risk (prescription drug management including medications given in the ED).    Assessment & Plan    Sadaf Ross is a 22 year old female who has multiple ED visits this month and is a frequent user of the emergency room who presents to the Emergency Department with back pain, abdominal pain and vomiting.  Vital signs on arrival reassuring with no tachycardia no hypotension no fever.  Nonperitoneal abdominal exam.  Reported concern from the group home the patient has been developing this behavior when going out on her Saturday shopping trips.  Apparently goes back to her group home and makes herself vomit.  Patient's physical exam was reassuring.  No peritoneal signs.  Vital signs reassuring.  Labs were obtained which show no leukocytosis, no electrolyte abnormalities.  No signs of urinary tract infection.  Patient improving after receiving some Zofran and IV fluids.  She is asking to go home.  States that she has nausea medication available at home.  Given resolution of symptoms, reassuring lab work and examination this plan seems reasonable.  We will have her follow-up with her primary care physician in the next week.  Return precautions provided.    I have reviewed the nursing notes. I have reviewed the findings, diagnosis, plan and need for follow up with the patient.    New Prescriptions    No medications on file       Final diagnoses:   Nausea and vomiting, unspecified vomiting type   I, SAM DURAN, am serving as a trained medical scribe to document services personally performed by German Voss MD, based on the provider's statements to me.     I, German Voss MD, was physically present and have reviewed and verified the accuracy of this note documented by SAM DURAN.     German Voss Jr., MD   [facility] EMERGENCY DEPARTMENT  8/19/2023     German Voss MD  08/19/23 6102

## 2023-08-19 NOTE — ED TRIAGE NOTES
Pt presented via EMS w/ c/o n/v, abd, and back pain. Pt states she started to have vomiting after returning from shopping and vomited 3x. Pt points to lower abd for pain area.     Triage Assessment       Row Name 08/19/23 8319       Triage Assessment (Adult)    Airway WDL WDL       Respiratory WDL    Respiratory WDL WDL       Cardiac WDL    Cardiac WDL WDL

## 2023-08-20 ENCOUNTER — HOSPITAL ENCOUNTER (EMERGENCY)
Facility: CLINIC | Age: 23
Discharge: NURSING, REHAB OR LONG-TERM CARE | End: 2023-08-20
Attending: EMERGENCY MEDICINE | Admitting: EMERGENCY MEDICINE
Payer: MEDICARE

## 2023-08-20 VITALS
WEIGHT: 249 LBS | OXYGEN SATURATION: 100 % | RESPIRATION RATE: 16 BRPM | DIASTOLIC BLOOD PRESSURE: 87 MMHG | TEMPERATURE: 98.2 F | HEART RATE: 76 BPM | SYSTOLIC BLOOD PRESSURE: 141 MMHG | HEIGHT: 64 IN | BODY MASS INDEX: 42.51 KG/M2

## 2023-08-20 DIAGNOSIS — R11.0 NAUSEA: ICD-10-CM

## 2023-08-20 LAB
ALBUMIN UR-MCNC: NEGATIVE MG/DL
APPEARANCE UR: CLEAR
BILIRUB UR QL STRIP: NEGATIVE
COLOR UR AUTO: ABNORMAL
GLUCOSE UR STRIP-MCNC: NEGATIVE MG/DL
HGB UR QL STRIP: NEGATIVE
KETONES UR STRIP-MCNC: NEGATIVE MG/DL
LEUKOCYTE ESTERASE UR QL STRIP: NEGATIVE
MUCOUS THREADS #/AREA URNS LPF: PRESENT /LPF
NITRATE UR QL: NEGATIVE
PH UR STRIP: 5.5 [PH] (ref 5–7)
RBC URINE: <1 /HPF
SP GR UR STRIP: 1.02 (ref 1–1.03)
SQUAMOUS EPITHELIAL: 1 /HPF
UROBILINOGEN UR STRIP-MCNC: NORMAL MG/DL
WBC URINE: 1 /HPF

## 2023-08-20 PROCEDURE — 99283 EMERGENCY DEPT VISIT LOW MDM: CPT | Performed by: EMERGENCY MEDICINE

## 2023-08-20 PROCEDURE — 99283 EMERGENCY DEPT VISIT LOW MDM: CPT | Mod: 25 | Performed by: EMERGENCY MEDICINE

## 2023-08-20 PROCEDURE — 81001 URINALYSIS AUTO W/SCOPE: CPT | Performed by: EMERGENCY MEDICINE

## 2023-08-20 PROCEDURE — 250N000011 HC RX IP 250 OP 636: Performed by: EMERGENCY MEDICINE

## 2023-08-20 RX ORDER — ONDANSETRON 4 MG/1
4 TABLET, ORALLY DISINTEGRATING ORAL ONCE
Status: COMPLETED | OUTPATIENT
Start: 2023-08-20 | End: 2023-08-20

## 2023-08-20 RX ADMIN — ONDANSETRON 4 MG: 4 TABLET, ORALLY DISINTEGRATING ORAL at 11:47

## 2023-08-20 NOTE — ED PROVIDER NOTES
[facility] EMERGENCY DEPARTMENT ([facility])    8/20/23      ED PROVIDER NOTE   Harry ISLAS 11:53 AM   History     Chief Complaint   Patient presents with    Nausea & Vomiting     BIB EMS for N/V since 7am. Abd pain with emesis. 4mg zofran ODT in rig.      The history is provided by the patient and medical records.     Sadaf Ross is a 22 year old female well-known to the emergency department for multiple visits for various complaints who presents to the emergency department reporting nausea and vomiting that started this morning around 7 AM.  She received 4 mg of Zofran by paramedics, feels improved with this.  She endorses abdominal pain and back pain.  She notes being here yesterday for similar complaints and received IV fluids.     Past Medical History  Past Medical History:   Diagnosis Date    ADHD (attention deficit hyperactivity disorder)     Bipolar 1 disorder (H)     Borderline personality disorder (H)     Depression     Depressive disorder     Intellectual disability     Obesity     Syncope      Past Surgical History:   Procedure Laterality Date    APPENDECTOMY      APPENDECTOMY       acetaminophen (TYLENOL) 325 MG tablet  albuterol (PROAIR HFA/PROVENTIL HFA/VENTOLIN HFA) 108 (90 Base) MCG/ACT inhaler  ARIPiprazole lauroxil ER (ARISTADA) 882 MG/3.2ML intra-muscular  bisacodyl (DULCOLAX) 5 MG EC tablet  calcium carbonate (TUMS) 500 MG chewable tablet  cyclobenzaprine (FLEXERIL) 10 MG tablet  dicyclomine (BENTYL) 20 MG tablet  docusate sodium (COLACE) 50 MG capsule  famotidine (PEPCID) 20 MG tablet  FLUoxetine (PROZAC) 40 MG capsule  hydrocortisone, Perianal, (HYDROCORTISONE) 2.5 % cream  hydrOXYzine (ATARAX) 50 MG tablet  LORazepam (ATIVAN) 0.5 MG tablet  mupirocin (BACTROBAN) 2 % external ointment  OLANZapine zydis (ZYPREXA) 5 MG ODT  ondansetron (ZOFRAN) 4 MG tablet  ondansetron (ZOFRAN) 4 MG tablet  pantoprazole (PROTONIX) 40 MG EC tablet  polyethylene glycol (MIRALAX) 17 GM/Dose  "powder  promethazine (PHENERGAN) 12.5 MG tablet  sennosides (SENOKOT) 8.6 MG tablet  traZODone (DESYREL) 50 MG tablet  Vitamin D, Cholecalciferol, 25 MCG (1000 UT) TABS      Allergies   Allergen Reactions    Penicillins Rash and Unknown     Family History  Family History   Problem Relation Age of Onset    Diabetes Type 1 Father     Cancer Paternal Grandfather      Social History   Social History     Tobacco Use    Smoking status: Every Day     Packs/day: 3.00     Years: 5.00     Pack years: 15.00     Types: Cigarettes    Smokeless tobacco: Never   Substance Use Topics    Alcohol use: No    Drug use: No         A medically appropriate review of systems was performed with pertinent positives and negatives noted in the HPI, and all other systems negative.    Physical Exam   BP: (!) 141/87  Pulse: 76  Temp: 98.2  F (36.8  C)  Resp: 16  Height: 162.6 cm (5' 4\")  Weight: 112.9 kg (249 lb)  SpO2: 100 %  Physical Exam  Constitutional:       Appearance: She is well-developed. She is obese.   HENT:      Head: Normocephalic and atraumatic.   Cardiovascular:      Rate and Rhythm: Normal rate and regular rhythm.      Heart sounds: Normal heart sounds.   Pulmonary:      Effort: Pulmonary effort is normal. No respiratory distress.      Breath sounds: Normal breath sounds.   Abdominal:      General: There is no distension.      Palpations: Abdomen is soft.      Tenderness: There is no abdominal tenderness. There is no rebound.   Musculoskeletal:         General: No tenderness.      Cervical back: Normal range of motion.   Skin:     General: Skin is warm and dry.   Neurological:      Mental Status: She is alert and oriented to person, place, and time.   Psychiatric:         Behavior: Behavior normal.         Thought Content: Thought content normal.           ED Course, Procedures, & Data      Procedures          Mental Health Risk Assessment        PSS-3      Date and Time Over the past 2 weeks have you felt down, depressed, or " hopeless? Over the past 2 weeks have you had thoughts of killing yourself? Have you ever attempted to kill yourself? When did this last happen? User   08/20/23 1141 yes yes yes -- AP                  Item Assessment   Suicidal Ideation None   Plan    Intent    Suicidal or self-harm behaviors    Risk Factors    Protective Factors           No results found for any visits on 08/20/23.  Medications   ondansetron (ZOFRAN ODT) ODT tab 4 mg (4 mg Oral $Given 8/20/23 1147)     Labs Ordered and Resulted from Time of ED Arrival to Time of ED Departure - No data to display  No orders to display          Critical care was not performed.     Medical Decision Making  The patient's presentation was of low complexity (a stable chronic illness).    The patient's evaluation involved:  review of external note(s) from 3+ sources (see separate area of note for details)    The patient's management necessitated moderate risk (prescription drug management including medications given in the ED).    Assessment & Plan    She presents to the ER due to 2 episodes of vomiting earlier this morning.  Patient received Zofran and is feeling better.  Patient's abdomen soft and nontender.  She is sitting comfortably no acute distress.  She was able to do an oral challenge here in the ER.  Patient is talking on her phone.  Plan will be to discharge her back to her group home.  Patient's care plan was reviewed.  Patient's recent ED admission from yesterday with labs were all reviewed.  Patient with no signs of acute illness.  Patient stable for discharge    I have reviewed the nursing notes. I have reviewed the findings, diagnosis, plan and need for follow up with the patient.    New Prescriptions    No medications on file       Final diagnoses:   Nausea       Destiney Rocha MD  [facility] EMERGENCY DEPARTMENT  8/20/2023     Destiney Rocha MD  08/20/23 2763

## 2023-08-20 NOTE — DISCHARGE INSTRUCTIONS
Your urine test is better.     You were given zofran for nausea in the ER.     You are stable to return to your group home.

## 2023-08-20 NOTE — ED TRIAGE NOTES
Pt presents to ED reports nausea and vomiting x2 since waking up this morning at 9am. She states she is unsure of cause. Reports she has appt with primary doctor tomorrow. Pt appears in stable condition.       Triage Assessment       Row Name 08/20/23 1139       Triage Assessment (Adult)    Airway WDL WDL       Respiratory WDL    Respiratory WDL WDL       Skin Circulation/Temperature WDL    Skin Circulation/Temperature WDL WDL       Cardiac WDL    Cardiac WDL WDL       Peripheral/Neurovascular WDL    Peripheral Neurovascular WDL WDL       Cognitive/Neuro/Behavioral WDL    Cognitive/Neuro/Behavioral WDL WDL

## 2023-08-21 ENCOUNTER — HOSPITAL ENCOUNTER (EMERGENCY)
Facility: CLINIC | Age: 23
Discharge: HOME | End: 2023-08-21
Attending: EMERGENCY MEDICINE | Admitting: EMERGENCY MEDICINE
Payer: MEDICARE

## 2023-08-21 VITALS
SYSTOLIC BLOOD PRESSURE: 114 MMHG | TEMPERATURE: 98.6 F | HEART RATE: 73 BPM | RESPIRATION RATE: 16 BRPM | DIASTOLIC BLOOD PRESSURE: 65 MMHG | OXYGEN SATURATION: 98 %

## 2023-08-21 DIAGNOSIS — F60.3 BORDERLINE PERSONALITY DISORDER (H): ICD-10-CM

## 2023-08-21 DIAGNOSIS — F79 INTELLECTUAL DISABILITY: ICD-10-CM

## 2023-08-21 DIAGNOSIS — R11.0 NAUSEA: ICD-10-CM

## 2023-08-21 LAB
ALBUMIN SERPL BCG-MCNC: 4.7 G/DL (ref 3.5–5.2)
ALP SERPL-CCNC: 71 U/L (ref 35–104)
ALT SERPL W P-5'-P-CCNC: 18 U/L (ref 0–50)
ANION GAP SERPL CALCULATED.3IONS-SCNC: 8 MMOL/L (ref 7–15)
AST SERPL W P-5'-P-CCNC: 16 U/L (ref 0–45)
BASOPHILS # BLD AUTO: 0 10E3/UL (ref 0–0.2)
BASOPHILS NFR BLD AUTO: 0 %
BILIRUB SERPL-MCNC: 0.2 MG/DL
BUN SERPL-MCNC: 11.3 MG/DL (ref 6–20)
CALCIUM SERPL-MCNC: 9.3 MG/DL (ref 8.6–10)
CHLORIDE SERPL-SCNC: 104 MMOL/L (ref 98–107)
CREAT SERPL-MCNC: 0.84 MG/DL (ref 0.51–0.95)
DEPRECATED HCO3 PLAS-SCNC: 26 MMOL/L (ref 22–29)
EOSINOPHIL # BLD AUTO: 0.2 10E3/UL (ref 0–0.7)
EOSINOPHIL NFR BLD AUTO: 2 %
ERYTHROCYTE [DISTWIDTH] IN BLOOD BY AUTOMATED COUNT: 12.9 % (ref 10–15)
GFR SERPL CREATININE-BSD FRML MDRD: >90 ML/MIN/1.73M2
GLUCOSE SERPL-MCNC: 83 MG/DL (ref 70–99)
HCT VFR BLD AUTO: 35.1 % (ref 35–47)
HGB BLD-MCNC: 12.1 G/DL (ref 11.7–15.7)
IMM GRANULOCYTES # BLD: 0 10E3/UL
IMM GRANULOCYTES NFR BLD: 0 %
LYMPHOCYTES # BLD AUTO: 2.9 10E3/UL (ref 0.8–5.3)
LYMPHOCYTES NFR BLD AUTO: 32 %
MCH RBC QN AUTO: 28.4 PG (ref 26.5–33)
MCHC RBC AUTO-ENTMCNC: 34.5 G/DL (ref 31.5–36.5)
MCV RBC AUTO: 82 FL (ref 78–100)
MONOCYTES # BLD AUTO: 0.5 10E3/UL (ref 0–1.3)
MONOCYTES NFR BLD AUTO: 6 %
NEUTROPHILS # BLD AUTO: 5.4 10E3/UL (ref 1.6–8.3)
NEUTROPHILS NFR BLD AUTO: 60 %
NRBC # BLD AUTO: 0 10E3/UL
NRBC BLD AUTO-RTO: 0 /100
PLATELET # BLD AUTO: 212 10E3/UL (ref 150–450)
POTASSIUM SERPL-SCNC: 4.3 MMOL/L (ref 3.4–5.3)
PROT SERPL-MCNC: 7.4 G/DL (ref 6.4–8.3)
RBC # BLD AUTO: 4.26 10E6/UL (ref 3.8–5.2)
SODIUM SERPL-SCNC: 138 MMOL/L (ref 136–145)
WBC # BLD AUTO: 9.1 10E3/UL (ref 4–11)

## 2023-08-21 PROCEDURE — 80053 COMPREHEN METABOLIC PANEL: CPT | Performed by: EMERGENCY MEDICINE

## 2023-08-21 PROCEDURE — 99283 EMERGENCY DEPT VISIT LOW MDM: CPT | Performed by: EMERGENCY MEDICINE

## 2023-08-21 PROCEDURE — 250N000013 HC RX MED GY IP 250 OP 250 PS 637: Performed by: EMERGENCY MEDICINE

## 2023-08-21 PROCEDURE — 85025 COMPLETE CBC W/AUTO DIFF WBC: CPT | Performed by: EMERGENCY MEDICINE

## 2023-08-21 PROCEDURE — 36415 COLL VENOUS BLD VENIPUNCTURE: CPT | Performed by: EMERGENCY MEDICINE

## 2023-08-21 RX ORDER — HYDROXYZINE HYDROCHLORIDE 50 MG/1
50 TABLET, FILM COATED ORAL ONCE
Status: COMPLETED | OUTPATIENT
Start: 2023-08-21 | End: 2023-08-21

## 2023-08-21 RX ADMIN — HYDROXYZINE HYDROCHLORIDE 50 MG: 50 TABLET, FILM COATED ORAL at 20:14

## 2023-08-21 ASSESSMENT — ACTIVITIES OF DAILY LIVING (ADL): ADLS_ACUITY_SCORE: 35

## 2023-08-21 NOTE — ED PROVIDER NOTES
ED Provider Note  [facility]      History     Chief Complaint   Patient presents with    Nausea     Nausea and vomiting for 4 days, zofran not working.    Suicidal     HPI  Sadaf Ross is a 22 year old female PMH of depression, BPD, ADHD who presents to the ER for evaluation of nausea. She says she has been nauseated for an hour and also now feels suicidal for the last 5 minutes. She is her own legal guardian.     Group home staff says the patient has been acting herself. She says her new thing is to complain of gi issues so she can visit the ED. In the past she would complain of suicidal thoughts so she could visit the ED. Staff says when the patient wants to go to the ED for gi issues she will stick her finger down her throat so she can throw up. No appetite changes.     Per chart review, patient has been seen on 8/20 and 8/19 for nausea and vomiting.  Patient states Zofran does not help with her nausea anymore.  Patient also had a call with nurse triage today complaining of dizziness.  As well as an office visit today for her nausea.        Physical Exam   BP: (!) 150/104  Pulse: 95  Temp: 98.5  F (36.9  C)  Resp: 16  SpO2: 100 %  Physical Exam  Vitals and nursing note reviewed.   Constitutional:       General: She is not in acute distress.     Appearance: She is obese. She is not ill-appearing, toxic-appearing or diaphoretic.   HENT:      Head: Normocephalic and atraumatic.      Right Ear: External ear normal.      Left Ear: External ear normal.      Nose: No congestion or rhinorrhea.   Eyes:      Extraocular Movements: Extraocular movements intact.   Cardiovascular:      Rate and Rhythm: Normal rate.   Pulmonary:      Effort: Pulmonary effort is normal.   Musculoskeletal:         General: No deformity or signs of injury.      Cervical back: Normal range of motion.   Skin:     Coloration: Skin is not jaundiced or pale.   Neurological:      General: No focal deficit present.       Mental Status: She is alert and oriented to person, place, and time.   Psychiatric:         Attention and Perception: Attention and perception normal.         Mood and Affect: Mood normal.         Speech: Speech normal.         Behavior: Behavior normal. Behavior is cooperative.         Thought Content: Thought content includes suicidal (chronic) ideation.         Cognition and Memory: Cognition and memory normal.         Judgment: Judgment normal.           ED Course, Procedures, & Data      Procedures         Mental Health Risk Assessment        PSS-3      Date and Time Over the past 2 weeks have you felt down, depressed, or hopeless? Over the past 2 weeks have you had thoughts of killing yourself? Have you ever attempted to kill yourself? When did this last happen? User   08/21/23 3364 yes yes yes -- Interwise          C-SSRS ([place])      Date and Time Q1 Wished to be Dead (Past Month) Q2 Suicidal Thoughts (Past Month) Q3 Suicidal Thought Method Q4 Suicidal Intent without Specific Plan Q5 Suicide Intent with Specific Plan Q6 Suicide Behavior (Lifetime) Within the Past 3 Months? RETIRED: Level of Risk per Screen Screening Not Complete User   08/21/23 1019 yes yes no no no yes -- -- -- [facility]                Item Assessment   Suicidal Ideation Suicidal thoughts   Plan None   Intent None   Suicidal or self-harm behaviors None.  She says she became suicidal 5 minutes ago while sitting in the ED and decided this when being asked ROS   Risk Factors Previous psychiatric diagnosis and treatments   Protective Factors Chronic si without recent attempts, staff at group home and family are protective factors          Results for orders placed or performed during the hospital encounter of 08/21/23   Comprehensive metabolic panel     Status: Normal   Result Value Ref Range    Sodium 138 136 - 145 mmol/L    Potassium 4.3 3.4 - 5.3 mmol/L    Chloride 104 98 - 107 mmol/L    Carbon Dioxide (CO2) 26 22 - 29 mmol/L    Anion Gap 8 7 - 15  mmol/L    Urea Nitrogen 11.3 6.0 - 20.0 mg/dL    Creatinine 0.84 0.51 - 0.95 mg/dL    Calcium 9.3 8.6 - 10.0 mg/dL    Glucose 83 70 - 99 mg/dL    Alkaline Phosphatase 71 35 - 104 U/L    AST 16 0 - 45 U/L    ALT 18 0 - 50 U/L    Protein Total 7.4 6.4 - 8.3 g/dL    Albumin 4.7 3.5 - 5.2 g/dL    Bilirubin Total 0.2 <=1.2 mg/dL    GFR Estimate >90 >60 mL/min/1.73m2   CBC with platelets and differential     Status: None   Result Value Ref Range    WBC Count 9.1 4.0 - 11.0 10e3/uL    RBC Count 4.26 3.80 - 5.20 10e6/uL    Hemoglobin 12.1 11.7 - 15.7 g/dL    Hematocrit 35.1 35.0 - 47.0 %    MCV 82 78 - 100 fL    MCH 28.4 26.5 - 33.0 pg    MCHC 34.5 31.5 - 36.5 g/dL    RDW 12.9 10.0 - 15.0 %    Platelet Count 212 150 - 450 10e3/uL    % Neutrophils 60 %    % Lymphocytes 32 %    % Monocytes 6 %    % Eosinophils 2 %    % Basophils 0 %    % Immature Granulocytes 0 %    NRBCs per 100 WBC 0 <1 /100    Absolute Neutrophils 5.4 1.6 - 8.3 10e3/uL    Absolute Lymphocytes 2.9 0.8 - 5.3 10e3/uL    Absolute Monocytes 0.5 0.0 - 1.3 10e3/uL    Absolute Eosinophils 0.2 0.0 - 0.7 10e3/uL    Absolute Basophils 0.0 0.0 - 0.2 10e3/uL    Absolute Immature Granulocytes 0.0 <=0.4 10e3/uL    Absolute NRBCs 0.0 10e3/uL   CBC with platelets differential     Status: None    Narrative    The following orders were created for panel order CBC with platelets differential.  Procedure                               Abnormality         Status                     ---------                               -----------         ------                     CBC with platelets and d...[[id number]]                      Final result                 Please view results for these tests on the individual orders.     Medications   hydrOXYzine (ATARAX) tablet 50 mg (50 mg Oral $Given 8/21/23 2014)     Labs Ordered and Resulted from Time of ED Arrival to Time of ED Departure   CBC WITH PLATELETS AND DIFFERENTIAL       Result Value    WBC Count 9.1      RBC Count 4.26       "Hemoglobin 12.1      Hematocrit 35.1      MCV 82      MCH 28.4      MCHC 34.5      RDW 12.9      Platelet Count 212      % Neutrophils 60      % Lymphocytes 32      % Monocytes 6      % Eosinophils 2      % Basophils 0      % Immature Granulocytes 0      NRBCs per 100 WBC 0      Absolute Neutrophils 5.4      Absolute Lymphocytes 2.9      Absolute Monocytes 0.5      Absolute Eosinophils 0.2      Absolute Basophils 0.0      Absolute Immature Granulocytes 0.0      Absolute NRBCs 0.0       No orders to display          Critical care was not performed.     Medical Decision Making  The patient's presentation was of high complexity (a chronic illness severe exacerbation, progression, or side effect of treatment).    The patient's evaluation involved:  an assessment requiring an independent historian (group home staff)  review of external note(s) from 3+ sources (frequent ed visits for similar)  ordering and/or review of 2 test(s) in this encounter (see separate area of note for details)  review of 3+ test result(s) ordered prior to this encounter (prior labs for nausea/vomiting cbc/bmp/cmp)    The patient's management necessitated high risk (a decision regarding hospitalization).    Assessment & Plan    Sadaf Ross is a 22 year old female PMH of depression, BPD, ADHD who presents to the ER for evaluation of nausea. Labs checked (cbc and comp met).  Reviewed recent labs from 2 days ago and notes last 2 ed visits.  Given atarax 50 mg po for nausea.  She declines taking zofran.  Patient comes repeatedly to the ED.  She would come for si but has now changed her presentation for n/v. Her abdominal exam is benign. Group home staff says she will stick her fingers down her throat so she can vomit and then come to the ED for a visit, which she likes to do.  Cmp and cbc were checked and normal.  She says she is suicidal \"for the last 5 minutes\" after she got to the ED. She was observed and says she would like to go back to her " group home.   I feel she is at baseline and can return to her group home. Group home staff is agreeable with her return.     I have reviewed the nursing notes. I have reviewed the findings, diagnosis, plan and need for follow up with the patient.    Discharge Medication List as of 8/21/2023  8:41 PM          Final diagnoses:   Nausea   Borderline personality disorder (H)   Intellectual disability       Ashely Toth MD   [facility] EMERGENCY DEPARTMENT  8/21/2023     Ashely Toth MD  08/22/23 1045

## 2023-08-22 ENCOUNTER — HOSPITAL ENCOUNTER (EMERGENCY)
Facility: CLINIC | Age: 23
Discharge: HOME | End: 2023-08-22
Attending: EMERGENCY MEDICINE | Admitting: EMERGENCY MEDICINE
Payer: MEDICARE

## 2023-08-22 VITALS
RESPIRATION RATE: 16 BRPM | SYSTOLIC BLOOD PRESSURE: 125 MMHG | DIASTOLIC BLOOD PRESSURE: 77 MMHG | HEART RATE: 65 BPM | TEMPERATURE: 98.6 F | BODY MASS INDEX: 44.21 KG/M2 | HEIGHT: 63 IN | OXYGEN SATURATION: 95 % | WEIGHT: 249.5 LBS

## 2023-08-22 VITALS
TEMPERATURE: 99 F | SYSTOLIC BLOOD PRESSURE: 115 MMHG | OXYGEN SATURATION: 98 % | HEART RATE: 79 BPM | RESPIRATION RATE: 16 BRPM | DIASTOLIC BLOOD PRESSURE: 78 MMHG

## 2023-08-22 DIAGNOSIS — R11.0 NAUSEA: ICD-10-CM

## 2023-08-22 DIAGNOSIS — R11.2 NAUSEA AND VOMITING, UNSPECIFIED VOMITING TYPE: ICD-10-CM

## 2023-08-22 LAB
ALBUMIN UR-MCNC: NEGATIVE MG/DL
APPEARANCE UR: CLEAR
BILIRUB UR QL STRIP: NEGATIVE
COLOR UR AUTO: ABNORMAL
GLUCOSE UR STRIP-MCNC: NEGATIVE MG/DL
HGB UR QL STRIP: NEGATIVE
KETONES UR STRIP-MCNC: NEGATIVE MG/DL
LEUKOCYTE ESTERASE UR QL STRIP: NEGATIVE
MUCOUS THREADS #/AREA URNS LPF: PRESENT /LPF
NITRATE UR QL: NEGATIVE
PH UR STRIP: 6.5 [PH] (ref 5–7)
RBC URINE: <1 /HPF
SP GR UR STRIP: 1.02 (ref 1–1.03)
SQUAMOUS EPITHELIAL: 3 /HPF
UROBILINOGEN UR STRIP-MCNC: NORMAL MG/DL
WBC URINE: 1 /HPF

## 2023-08-22 PROCEDURE — 250N000011 HC RX IP 250 OP 636: Performed by: EMERGENCY MEDICINE

## 2023-08-22 PROCEDURE — 99284 EMERGENCY DEPT VISIT MOD MDM: CPT | Performed by: EMERGENCY MEDICINE

## 2023-08-22 PROCEDURE — 99283 EMERGENCY DEPT VISIT LOW MDM: CPT | Performed by: EMERGENCY MEDICINE

## 2023-08-22 PROCEDURE — 81001 URINALYSIS AUTO W/SCOPE: CPT | Performed by: EMERGENCY MEDICINE

## 2023-08-22 PROCEDURE — 99283 EMERGENCY DEPT VISIT LOW MDM: CPT | Mod: 27 | Performed by: EMERGENCY MEDICINE

## 2023-08-22 PROCEDURE — 250N000013 HC RX MED GY IP 250 OP 250 PS 637: Performed by: EMERGENCY MEDICINE

## 2023-08-22 RX ORDER — ONDANSETRON 4 MG/1
4 TABLET, ORALLY DISINTEGRATING ORAL ONCE
Status: COMPLETED | OUTPATIENT
Start: 2023-08-22 | End: 2023-08-22

## 2023-08-22 RX ORDER — HYDROXYZINE HYDROCHLORIDE 25 MG/1
25 TABLET, FILM COATED ORAL ONCE
Status: COMPLETED | OUTPATIENT
Start: 2023-08-22 | End: 2023-08-22

## 2023-08-22 RX ADMIN — HYDROXYZINE HYDROCHLORIDE 25 MG: 25 TABLET, FILM COATED ORAL at 18:59

## 2023-08-22 RX ADMIN — ONDANSETRON 4 MG: 4 TABLET, ORALLY DISINTEGRATING ORAL at 18:59

## 2023-08-22 ASSESSMENT — ACTIVITIES OF DAILY LIVING (ADL)
ADLS_ACUITY_SCORE: 35
ADLS_ACUITY_SCORE: 37

## 2023-08-22 NOTE — DISCHARGE INSTRUCTIONS
Eat small amounts at a time.  Follow-up with your primary care provider if your symptoms continue.

## 2023-08-22 NOTE — ED PROVIDER NOTES
"    [facility] EMERGENCY DEPARTMENT ([facility])    8/22/23      ED PROVIDER NOTE  [id number]      History     Chief Complaint   Patient presents with    Nausea     BIBA per pt she has been vomiting but last emesis was 0200 \" I have been told that I have a bug\" VSS per EMS. Pt was not vomiting enroute. Pt refused t have anything done, refused Zofran \" it claudia not work\"     HPI  Sadaf Ross is a 22 year old female with a past medical history significant for depression, BPD, SI, ADHD and and intellectual disability who presents to the ED for evaluation of nausea and vomiting.  She states she has had the symptoms for the past several days.  Says she cannot tolerate Zofran at home.  She also complaining of increased urinary frequency for the past 3 days.  Was seen in the ED earlier today neuro analysis was normal.  Patient reportedly refused vitals and nausea medicine in route.    Per chart review, patient has frequent visits to ED and was just discharged from this ED earlier today; 16 visits since the start of August 2023.  Patient's most recent visit was yesterday here at [facility] ED where she presented for evaluation of nausea.  She said that she has been nauseated for an hour and that she felt suicidal for the past 5 minutes.     Group home staff said the patient has been acting herself.  Her new thing is to complain of GI issues that she can visit the ED.  Patient will stick her finger down her throat so she can throw up so she can leave and be seen there. In past, she would complain of SI so she could visit the ED.    Past Medical History  Past Medical History:   Diagnosis Date    ADHD (attention deficit hyperactivity disorder)     Bipolar 1 disorder (H)     Borderline personality disorder (H)     Depression     Depressive disorder     Intellectual disability     Obesity     Syncope      Past Surgical History:   Procedure Laterality Date    APPENDECTOMY      APPENDECTOMY       acetaminophen " (TYLENOL) 325 MG tablet  albuterol (PROAIR HFA/PROVENTIL HFA/VENTOLIN HFA) 108 (90 Base) MCG/ACT inhaler  ARIPiprazole lauroxil ER (ARISTADA) 882 MG/3.2ML intra-muscular  bisacodyl (DULCOLAX) 5 MG EC tablet  calcium carbonate (TUMS) 500 MG chewable tablet  cyclobenzaprine (FLEXERIL) 10 MG tablet  dicyclomine (BENTYL) 20 MG tablet  docusate sodium (COLACE) 50 MG capsule  famotidine (PEPCID) 20 MG tablet  FLUoxetine (PROZAC) 40 MG capsule  hydrocortisone, Perianal, (HYDROCORTISONE) 2.5 % cream  hydrOXYzine (ATARAX) 50 MG tablet  LORazepam (ATIVAN) 0.5 MG tablet  mupirocin (BACTROBAN) 2 % external ointment  OLANZapine zydis (ZYPREXA) 5 MG ODT  ondansetron (ZOFRAN) 4 MG tablet  ondansetron (ZOFRAN) 4 MG tablet  pantoprazole (PROTONIX) 40 MG EC tablet  polyethylene glycol (MIRALAX) 17 GM/Dose powder  promethazine (PHENERGAN) 12.5 MG tablet  sennosides (SENOKOT) 8.6 MG tablet  traZODone (DESYREL) 50 MG tablet  Vitamin D, Cholecalciferol, 25 MCG (1000 UT) TABS      Allergies   Allergen Reactions    Penicillins Rash and Unknown     Family History  Family History   Problem Relation Age of Onset    Diabetes Type 1 Father     Cancer Paternal Grandfather      Social History   Social History     Tobacco Use    Smoking status: Every Day     Packs/day: 3.00     Years: 5.00     Pack years: 15.00     Types: Cigarettes    Smokeless tobacco: Never   Substance Use Topics    Alcohol use: No    Drug use: No         A medically appropriate review of systems was performed with pertinent positives and negatives noted in the HPI, and all other systems negative.    Physical Exam   BP: 118/80  Pulse: 79  Temp: 98.8  F (37.1  C)  Resp: 16  SpO2: 97 %  Physical Exam  Vitals and nursing note reviewed.   Constitutional:       General: She is not in acute distress.     Appearance: Normal appearance.   HENT:      Head: Normocephalic.      Nose: Nose normal.   Eyes:      Pupils: Pupils are equal, round, and reactive to light.   Cardiovascular:       Rate and Rhythm: Normal rate and regular rhythm.   Pulmonary:      Effort: Pulmonary effort is normal.   Abdominal:      General: There is no distension.   Musculoskeletal:         General: No deformity. Normal range of motion.      Cervical back: Normal range of motion.   Skin:     General: Skin is warm.   Neurological:      Mental Status: She is alert and oriented to person, place, and time.   Psychiatric:         Mood and Affect: Mood normal.         Behavior: Behavior is cooperative.         Thought Content: Thought content is not paranoid. Thought content does not include homicidal or suicidal ideation.         Cognition and Memory: Cognition normal.           ED Course, Procedures, & Data          Results for orders placed or performed during the hospital encounter of 08/22/23   UA with Microscopic reflex to Culture     Status: Abnormal    Specimen: Urine, Midstream   Result Value Ref Range    Color Urine Light Yellow Colorless, Straw, Light Yellow, Yellow    Appearance Urine Clear Clear    Glucose Urine Negative Negative mg/dL    Bilirubin Urine Negative Negative    Ketones Urine Negative Negative mg/dL    Specific Gravity Urine 1.019 1.003 - 1.035    Blood Urine Negative Negative    pH Urine 6.5 5.0 - 7.0    Protein Albumin Urine Negative Negative mg/dL    Urobilinogen Urine Normal Normal, 2.0 mg/dL    Nitrite Urine Negative Negative    Leukocyte Esterase Urine Negative Negative    Mucus Urine Present (A) None Seen /LPF    RBC Urine <1 <=2 /HPF    WBC Urine 1 <=5 /HPF    Squamous Epithelials Urine 3 (H) <=1 /HPF    Narrative    Urine Culture not indicated     Medications   hydrOXYzine (ATARAX) tablet 25 mg (25 mg Oral $Given 8/22/23 1859)   ondansetron (ZOFRAN ODT) ODT tab 4 mg (4 mg Oral $Given 8/22/23 1859)     Labs Ordered and Resulted from Time of ED Arrival to Time of ED Departure - No data to display  No orders to display          Critical care was not performed.     Medical Decision Making  The  patient's presentation was of moderate complexity (a chronic illness mild to moderate exacerbation, progression, or side effect of treatment).    The patient's evaluation involved:  review of 3+ test result(s) ordered prior to this encounter (see separate area of note for details)    The patient's management necessitated moderate risk (prescription drug management including medications given in the ED).    Assessment & Plan    Patient is well-known to the ED presents with a complaint of nausea vomiting and urinary frequency.  She has been seen in the ED multiple times over the past several days.  She was seen in the ED earlier this morning.  Urinalysis at that time was negative for infection.  Physical exam is benign.  She is in no acute distress.  No mental health issues.  Low suspicion for intra-abdominal pathology.  Reviewed her labs from 8/21/2023 which showed normal electrolytes, normal renal function.  No transaminitis or bilirubin elevation to suggest hepatobiliary pathology.  CBC without leukocytosis or anemia.  Low suspicion for infectious etiology.    Exact cause of her symptoms unclear but patient does have a history of frequent ED visits with intent of getting out of the group home for a period of time.  Do not feel that she needs emergent mental health evaluation.  She has not had any vomiting while in the ED for a total of 3 hours.  She was given Zofran and hydroxyzine which she tolerated without issue.  She has antiemetics at home and does not need refills.  Discharged back to group home in good condition.      I have reviewed the nursing notes. I have reviewed the findings, diagnosis, plan and need for follow up with the patient.    Discharge Medication List as of 8/22/2023  7:38 PM          Final diagnoses:   Nausea       Ghassan Browning DO  [facility] EMERGENCY DEPARTMENT  8/22/2023     Ghassan Browning DO  08/22/23 4552

## 2023-08-22 NOTE — ED TRIAGE NOTES
Vomiting x 1 week.     Triage Assessment       Row Name 08/22/23 4544       Triage Assessment (Adult)    Airway WDL WDL       Respiratory WDL    Respiratory WDL WDL       Skin Circulation/Temperature WDL    Skin Circulation/Temperature WDL WDL       Cardiac WDL    Cardiac WDL WDL       Peripheral/Neurovascular WDL    Peripheral Neurovascular WDL WDL       Cognitive/Neuro/Behavioral WDL    Cognitive/Neuro/Behavioral WDL WDL

## 2023-08-22 NOTE — ED PROVIDER NOTES
[facility] EMERGENCY DEPARTMENT ([facility])    8/22/23      ED PROVIDER NOTE        History     Chief Complaint   Patient presents with    Nausea, Vomiting, & Diarrhea     Nausea and vomiting x 1 week     HPI  Sadaf Ross is a 22 year old female with a past medical history significant for depression, BPD, SI, ADHD and and intellectual disability who presents to the ED for evaluation of nausea vomiting.  She tells me that she has had some nausea vomiting for the past week, however, she has had several ED visits within the last week.  She denies abdominal pain, fever, chest pain, shortness of breath, diarrhea.  Patient denies previous abdominal surgeries, denies dysuria, hematuria, vaginal discharge or itching, denies chance of sexually transmitted infection.  She does not know when her last menstrual period was, however, gets Depo shots for birth control.    Per chart review, patient has frequent visits to ED; 15 visits since the start of August 2023.  Patient's most recent visit was yesterday here at [facility] ED where she presented for evaluation of nausea.  She said that she has been nauseated for an hour and that she felt suicidal for the past 5 minutes.    Group home staff said the patient has been acting herself.  Her new thing is to complain of GI issues that she can visit the ED.  Patient will stick her finger down her throat so she can throw up so she can leave and be seen there. In past, she would complain of SI so she could visit the ED.      Past Medical History  Past Medical History:   Diagnosis Date    ADHD (attention deficit hyperactivity disorder)     Bipolar 1 disorder (H)     Borderline personality disorder (H)     Depression     Depressive disorder     Intellectual disability     Obesity     Syncope      Past Surgical History:   Procedure Laterality Date    APPENDECTOMY      APPENDECTOMY       acetaminophen (TYLENOL) 325 MG tablet  albuterol (PROAIR HFA/PROVENTIL  "HFA/VENTOLIN HFA) 108 (90 Base) MCG/ACT inhaler  ARIPiprazole lauroxil ER (ARISTADA) 882 MG/3.2ML intra-muscular  bisacodyl (DULCOLAX) 5 MG EC tablet  calcium carbonate (TUMS) 500 MG chewable tablet  cyclobenzaprine (FLEXERIL) 10 MG tablet  dicyclomine (BENTYL) 20 MG tablet  docusate sodium (COLACE) 50 MG capsule  famotidine (PEPCID) 20 MG tablet  FLUoxetine (PROZAC) 40 MG capsule  hydrocortisone, Perianal, (HYDROCORTISONE) 2.5 % cream  hydrOXYzine (ATARAX) 50 MG tablet  LORazepam (ATIVAN) 0.5 MG tablet  mupirocin (BACTROBAN) 2 % external ointment  OLANZapine zydis (ZYPREXA) 5 MG ODT  ondansetron (ZOFRAN) 4 MG tablet  ondansetron (ZOFRAN) 4 MG tablet  pantoprazole (PROTONIX) 40 MG EC tablet  polyethylene glycol (MIRALAX) 17 GM/Dose powder  promethazine (PHENERGAN) 12.5 MG tablet  sennosides (SENOKOT) 8.6 MG tablet  traZODone (DESYREL) 50 MG tablet  Vitamin D, Cholecalciferol, 25 MCG (1000 UT) TABS      Allergies   Allergen Reactions    Penicillins Rash and Unknown     Family History  Family History   Problem Relation Age of Onset    Diabetes Type 1 Father     Cancer Paternal Grandfather      Social History   Social History     Tobacco Use    Smoking status: Every Day     Packs/day: 3.00     Years: 5.00     Pack years: 15.00     Types: Cigarettes    Smokeless tobacco: Never   Substance Use Topics    Alcohol use: No    Drug use: No      Past medical history, past surgical history, medications, allergies, family history, and social history were reviewed with the patient. No additional pertinent items.      A medically appropriate review of systems was performed with pertinent positives and negatives noted in the HPI, and all other systems negative.    Physical Exam   BP: 125/77  Pulse: 65  Temp: 98.6  F (37  C)  Resp: 16  Height: 160.5 cm (5' 3.19\")  Weight: 113.2 kg (249 lb 8 oz)  SpO2: 98 %  Physical Exam  GEN:  Alert, well developed, no acute distress  HEENT:  PERRL, EOMI, Mucous membranes are moist.   Cardio:  RRR, " no murmur, radial pulses equal bilaterally  PULM:  Lungs clear, good air movement, no wheezes, rales   Abd:  Soft, normal bowel sounds, no focal tenderness  Back exam:  No CVA tenderness  Musculoskeletal:  normal range of motion, no lower extremity swelling or calf tenderness  Neuro:  Alert and oriented X3, Follows commands, moving all extremities spontaneously   Skin:  Warm, dry     ED Course, Procedures, & Data      Procedures       Urine sample was obtained, results are shown below.  Patient had labs done yesterday including LFTs, CBC, comprehensive metabolic panel.  These were reviewed by me, results are unremarkable.  She has had a UPT done within the last couple days as well, it was negative.     Results for orders placed or performed during the hospital encounter of 08/22/23   UA with Microscopic reflex to Culture     Status: Abnormal    Specimen: Urine, Midstream   Result Value Ref Range    Color Urine Light Yellow Colorless, Straw, Light Yellow, Yellow    Appearance Urine Clear Clear    Glucose Urine Negative Negative mg/dL    Bilirubin Urine Negative Negative    Ketones Urine Negative Negative mg/dL    Specific Gravity Urine 1.019 1.003 - 1.035    Blood Urine Negative Negative    pH Urine 6.5 5.0 - 7.0    Protein Albumin Urine Negative Negative mg/dL    Urobilinogen Urine Normal Normal, 2.0 mg/dL    Nitrite Urine Negative Negative    Leukocyte Esterase Urine Negative Negative    Mucus Urine Present (A) None Seen /LPF    RBC Urine <1 <=2 /HPF    WBC Urine 1 <=5 /HPF    Squamous Epithelials Urine 3 (H) <=1 /HPF    Narrative    Urine Culture not indicated     Medications - No data to display  Labs Ordered and Resulted from Time of ED Arrival to Time of ED Departure   ROUTINE UA WITH MICROSCOPIC REFLEX TO CULTURE - Abnormal       Result Value    Color Urine Light Yellow      Appearance Urine Clear      Glucose Urine Negative      Bilirubin Urine Negative      Ketones Urine Negative      Specific Gravity Urine  1.019      Blood Urine Negative      pH Urine 6.5      Protein Albumin Urine Negative      Urobilinogen Urine Normal      Nitrite Urine Negative      Leukocyte Esterase Urine Negative      Mucus Urine Present (*)     RBC Urine <1      WBC Urine 1      Squamous Epithelials Urine 3 (*)      No orders to display        Patient was given a sandwich in the ED, kept it down, had no further symptoms, does not have abdominal tenderness or pain, I do not think she needs further work-up today.    Critical care was not performed.     Medical Decision Making  The patient's presentation was of high complexity (an acute health issue posing potential threat to life or bodily function).    The patient's evaluation involved:  ordering and/or review of 3+ test(s) in this encounter (see separate area of note for details)  review of 3+ test result(s) ordered prior to this encounter (see separate area of note for details)    The patient's management necessitated only low risk treatment.    Assessment & Plan    Patient symptoms resolved without any treatment in the ED.  She wished to be discharged back to her group home and was discharged back to her group home.  She has a number of underlying mental health problems, seen here frequently for mental health reasons, did not have any mental health complaints today.    I have reviewed the nursing notes. I have reviewed the findings, diagnosis, plan and need for follow up with the patient.    Discharge Medication List as of 8/22/2023  2:29 PM          Final diagnoses:   Nausea and vomiting, unspecified vomiting type         [facility] EMERGENCY DEPARTMENT  8/22/2023     Emma Negron MD  08/22/23 0925

## 2023-08-22 NOTE — ED NOTES
Called group [facility] and informed them of the patient discharge. Called and talked with Arlene staff from group  [facility].

## 2023-08-22 NOTE — ED NOTES
Bed: [facility]-  Expected date:   Expected time:   Means of arrival:   Comments:  [id number]  22y F  Nausea x1 week

## 2023-08-24 ENCOUNTER — HOSPITAL ENCOUNTER (EMERGENCY)
Facility: CLINIC | Age: 23
Discharge: LEFT BEFORE DISCHARGE | End: 2023-08-24
Payer: MEDICARE

## 2023-08-25 ENCOUNTER — HOSPITAL ENCOUNTER (EMERGENCY)
Facility: CLINIC | Age: 23
Discharge: HOME | End: 2023-08-25
Attending: PSYCHIATRY & NEUROLOGY | Admitting: PSYCHIATRY & NEUROLOGY
Payer: MEDICARE

## 2023-08-25 VITALS
DIASTOLIC BLOOD PRESSURE: 73 MMHG | TEMPERATURE: 98.9 F | HEART RATE: 99 BPM | OXYGEN SATURATION: 97 % | SYSTOLIC BLOOD PRESSURE: 128 MMHG | RESPIRATION RATE: 20 BRPM

## 2023-08-25 DIAGNOSIS — F43.0 ACUTE REACTION TO SITUATIONAL STRESS: ICD-10-CM

## 2023-08-25 PROCEDURE — 99282 EMERGENCY DEPT VISIT SF MDM: CPT | Performed by: PSYCHIATRY & NEUROLOGY

## 2023-08-25 PROCEDURE — 99284 EMERGENCY DEPT VISIT MOD MDM: CPT | Performed by: PSYCHIATRY & NEUROLOGY

## 2023-08-25 NOTE — ED TRIAGE NOTES
Patient reports she has been having a really bad today. Patient reports the group home closed her door even though the patient was concerned about the smoke alarm. Patient also reports the staff called her a bitch, patient reported it to Arlene who is going to talk to that staff member. Patient reports she was so upset that she called a cab to come here. Patient denies SI today.

## 2023-08-25 NOTE — ED TRIAGE NOTES
Triage Assessment       Row Name 08/25/23 2303       Triage Assessment (Adult)    Airway WDL WDL       Respiratory WDL    Respiratory WDL WDL       Skin Circulation/Temperature WDL    Skin Circulation/Temperature WDL WDL       Cardiac WDL    Cardiac WDL WDL       Peripheral/Neurovascular WDL    Peripheral Neurovascular WDL WDL       Cognitive/Neuro/Behavioral WDL    Cognitive/Neuro/Behavioral WDL WDL

## 2023-08-25 NOTE — ED PROVIDER NOTES
ED Provider Note  [facility]      History     Chief Complaint   Patient presents with    Mental Health Problem     Patient denies SI but reports having a really bad day because her staff swore at her. Patient became so upset she called for a ride to come here.      HPI  Sadaf Ross is a 22 year old female who is here as she was feeling distressed with conflict with her group home staff. She felt she could not stay there and opted to come to the ED. She now feels better and is ready to return home. She denies any thoughts of harm to self or others.    Past Medical History  Past Medical History:   Diagnosis Date    ADHD (attention deficit hyperactivity disorder)     Bipolar 1 disorder (H)     Borderline personality disorder (H)     Depression     Depressive disorder     Intellectual disability     Obesity     Syncope      Past Surgical History:   Procedure Laterality Date    APPENDECTOMY      APPENDECTOMY       acetaminophen (TYLENOL) 325 MG tablet  albuterol (PROAIR HFA/PROVENTIL HFA/VENTOLIN HFA) 108 (90 Base) MCG/ACT inhaler  ARIPiprazole lauroxil ER (ARISTADA) 882 MG/3.2ML intra-muscular  bisacodyl (DULCOLAX) 5 MG EC tablet  calcium carbonate (TUMS) 500 MG chewable tablet  cyclobenzaprine (FLEXERIL) 10 MG tablet  dicyclomine (BENTYL) 20 MG tablet  docusate sodium (COLACE) 50 MG capsule  famotidine (PEPCID) 20 MG tablet  FLUoxetine (PROZAC) 40 MG capsule  hydrocortisone, Perianal, (HYDROCORTISONE) 2.5 % cream  hydrOXYzine (ATARAX) 50 MG tablet  LORazepam (ATIVAN) 0.5 MG tablet  mupirocin (BACTROBAN) 2 % external ointment  OLANZapine zydis (ZYPREXA) 5 MG ODT  ondansetron (ZOFRAN) 4 MG tablet  ondansetron (ZOFRAN) 4 MG tablet  pantoprazole (PROTONIX) 40 MG EC tablet  polyethylene glycol (MIRALAX) 17 GM/Dose powder  promethazine (PHENERGAN) 12.5 MG tablet  sennosides (SENOKOT) 8.6 MG tablet  traZODone (DESYREL) 50 MG tablet  Vitamin D, Cholecalciferol, 25 MCG (1000 UT)  TABS      Allergies   Allergen Reactions    Penicillins Rash and Unknown     Family History  Family History   Problem Relation Age of Onset    Diabetes Type 1 Father     Cancer Paternal Grandfather      Social History   Social History     Tobacco Use    Smoking status: Every Day     Packs/day: 3.00     Years: 5.00     Pack years: 15.00     Types: Cigarettes    Smokeless tobacco: Never   Substance Use Topics    Alcohol use: No    Drug use: No         A medically appropriate review of systems was performed with pertinent positives and negatives noted in the HPI, and all other systems negative.    Physical Exam   BP: 128/73  Pulse: 99  Temp: 98.9  F (37.2  C)  Resp: 20  SpO2: 97 %  Physical Exam  Vitals and nursing note reviewed.   HENT:      Head: Normocephalic.   Eyes:      Pupils: Pupils are equal, round, and reactive to light.   Pulmonary:      Effort: Pulmonary effort is normal.   Musculoskeletal:         General: Normal range of motion.      Cervical back: Normal range of motion.   Neurological:      General: No focal deficit present.      Mental Status: She is alert.   Psychiatric:         Attention and Perception: Attention and perception normal.         Mood and Affect: Mood and affect normal.         Speech: Speech normal.         Behavior: Behavior normal. Behavior is not agitated, aggressive, hyperactive or combative. Behavior is cooperative.         Thought Content: Thought content normal. Thought content is not paranoid or delusional. Thought content does not include homicidal or suicidal ideation.         Cognition and Memory: Cognition and memory normal.         Judgment: Judgment normal.           ED Course, Procedures, & Data      Procedures       10 minutes spent talking with patient about her distress and safety.     No results found for any visits on 08/25/23.  Medications - No data to display  Labs Ordered and Resulted from Time of ED Arrival to Time of ED Departure - No data to display  No orders  to display          Critical care was not performed.     Medical Decision Making  The patient's presentation was of moderate complexity (a chronic illness mild to moderate exacerbation, progression, or side effect of treatment).    The patient's evaluation involved:  review of external note(s) from 2 sources (see separate area of note for details)    The patient's management necessitated moderate risk (limitations due to social determinants of health (inadequate community support)).    Assessment & Plan    Patient is here coming from her group home as she was in acute distress. Patient has a long history of limited coping and low frustration tolerance. She uses the ED excessively for her care needs. Patient presently feels much improved and is comfortable with returning to her group home. She can be discharged. She is encouraged to follow-up with established care and services.    I have reviewed the nursing notes. I have reviewed the findings, diagnosis, plan and need for follow up with the patient.    New Prescriptions    No medications on file       Final diagnoses:   Acute reaction to situational stress       Magno Sena MD  formerly [facility] EMERGENCY DEPARTMENT  8/25/2023     Magno Sena MD  08/25/23 6333

## 2023-08-25 NOTE — ED NOTES
Patient reports feeling frustrated after a verbal argument with  staff. Patient reports feeling better after being seen in the ED and is ok with plan to go home. Patient called own [employer] cab for discharge.

## 2023-08-28 ENCOUNTER — HOSPITAL ENCOUNTER (EMERGENCY)
Facility: CLINIC | Age: 23
Discharge: HOME | End: 2023-08-28
Attending: PSYCHIATRY & NEUROLOGY | Admitting: PSYCHIATRY & NEUROLOGY
Payer: MEDICARE

## 2023-08-28 VITALS
OXYGEN SATURATION: 99 % | RESPIRATION RATE: 18 BRPM | DIASTOLIC BLOOD PRESSURE: 94 MMHG | TEMPERATURE: 99.1 F | HEART RATE: 95 BPM | SYSTOLIC BLOOD PRESSURE: 135 MMHG

## 2023-08-28 DIAGNOSIS — F43.0 ACUTE REACTION TO STRESS: ICD-10-CM

## 2023-08-28 DIAGNOSIS — F79 INTELLECTUAL DISABILITY: ICD-10-CM

## 2023-08-28 PROCEDURE — 99285 EMERGENCY DEPT VISIT HI MDM: CPT | Mod: 25 | Performed by: PSYCHIATRY & NEUROLOGY

## 2023-08-28 PROCEDURE — 99284 EMERGENCY DEPT VISIT MOD MDM: CPT | Performed by: PSYCHIATRY & NEUROLOGY

## 2023-08-28 ASSESSMENT — ACTIVITIES OF DAILY LIVING (ADL): ADLS_ACUITY_SCORE: 37

## 2023-08-28 NOTE — ED PROVIDER NOTES
[facility] EMERGENCY DEPARTMENT ([facility])  8/28/23  [facility]      History     Chief Complaint   Patient presents with    Suicidal     HPI  Sadaf Ross is a 22 year old female with a past medical history significant for ADHD, bipolar 1, BPD, depression, intellectual disability, and SI who presents to the Emergency Department for evaluation of feeling overwhelmed with stress. Patient is well-known to the ED due to her excessive usage to address her needs. Patient was having distress with staff in her group home. She is feeling better here. She declined further meds. She was offered time to process and calm down.    Past Medical History  Past Medical History:   Diagnosis Date    ADHD (attention deficit hyperactivity disorder)     Bipolar 1 disorder (H)     Borderline personality disorder (H)     Depression     Depressive disorder     Intellectual disability     Obesity     Syncope      Past Surgical History:   Procedure Laterality Date    APPENDECTOMY      APPENDECTOMY       acetaminophen (TYLENOL) 325 MG tablet  albuterol (PROAIR HFA/PROVENTIL HFA/VENTOLIN HFA) 108 (90 Base) MCG/ACT inhaler  ARIPiprazole lauroxil ER (ARISTADA) 882 MG/3.2ML intra-muscular  bisacodyl (DULCOLAX) 5 MG EC tablet  calcium carbonate (TUMS) 500 MG chewable tablet  cyclobenzaprine (FLEXERIL) 10 MG tablet  dicyclomine (BENTYL) 20 MG tablet  docusate sodium (COLACE) 50 MG capsule  famotidine (PEPCID) 20 MG tablet  FLUoxetine (PROZAC) 40 MG capsule  hydrocortisone, Perianal, (HYDROCORTISONE) 2.5 % cream  hydrOXYzine (ATARAX) 50 MG tablet  LORazepam (ATIVAN) 0.5 MG tablet  mupirocin (BACTROBAN) 2 % external ointment  OLANZapine zydis (ZYPREXA) 5 MG ODT  ondansetron (ZOFRAN) 4 MG tablet  ondansetron (ZOFRAN) 4 MG tablet  pantoprazole (PROTONIX) 40 MG EC tablet  polyethylene glycol (MIRALAX) 17 GM/Dose powder  promethazine (PHENERGAN) 12.5 MG tablet  sennosides (SENOKOT) 8.6 MG tablet  traZODone (DESYREL) 50 MG tablet  Vitamin D,  Cholecalciferol, 25 MCG (1000 UT) TABS      Allergies   Allergen Reactions    Penicillins Rash and Unknown     Family History  Family History   Problem Relation Age of Onset    Diabetes Type 1 Father     Cancer Paternal Grandfather      Social History   Social History     Tobacco Use    Smoking status: Every Day     Packs/day: 3.00     Years: 5.00     Pack years: 15.00     Types: Cigarettes    Smokeless tobacco: Never   Substance Use Topics    Alcohol use: No    Drug use: No         A medically appropriate review of systems was performed with pertinent positives and negatives noted in the HPI, and all other systems negative.    Physical Exam   BP:  (vitals declined)  Pulse: 95  Temp: 99.1  F (37.3  C)  Resp: 18  SpO2: 99 %  Physical Exam  Vitals and nursing note reviewed.   HENT:      Head: Normocephalic.   Eyes:      Pupils: Pupils are equal, round, and reactive to light.   Pulmonary:      Effort: Pulmonary effort is normal.   Musculoskeletal:         General: Normal range of motion.      Cervical back: Normal range of motion.   Neurological:      General: No focal deficit present.      Mental Status: She is alert.   Psychiatric:         Attention and Perception: Attention and perception normal. She does not perceive auditory or visual hallucinations.         Mood and Affect: Mood and affect normal.         Speech: Speech normal.         Behavior: Behavior normal. Behavior is not agitated, aggressive, hyperactive or combative. Behavior is cooperative.         Thought Content: Thought content normal. Thought content is not paranoid or delusional. Thought content does not include homicidal or suicidal ideation.         Cognition and Memory: Cognition and memory normal.         Judgment: Judgment normal.           ED Course, Procedures, & Data      Procedures       Mental Health Risk Assessment        PSS-3      Date and Time Over the past 2 weeks have you felt down, depressed, or hopeless? Over the past 2 weeks have  you had thoughts of killing yourself? Have you ever attempted to kill yourself? When did this last happen? User   08/28/23 1241 yes yes yes more than 6 months ago MM          C-SSRS ([place])      Date and Time Q1 Wished to be Dead (Past Month) Q2 Suicidal Thoughts (Past Month) Q3 Suicidal Thought Method Q4 Suicidal Intent without Specific Plan Q5 Suicide Intent with Specific Plan Q6 Suicide Behavior (Lifetime) Within the Past 3 Months? RETIRED: Level of Risk per Screen Screening Not Complete User   08/28/23 1300 yes yes yes no no no -- -- -- MRB   08/28/23 1241 no yes no no no yes -- -- -- MM                Item Assessment   Suicidal Ideation None   Plan None   Intent None   Suicidal or self-harm behaviors Chronic at baseline   Risk Factors Non-compliant with treatment and Agitation or severe anxiety   Protective Factors Identified reasons for living          No results found for any visits on 08/28/23.  Medications - No data to display  Labs Ordered and Resulted from Time of ED Arrival to Time of ED Departure - No data to display  No orders to display          Critical care was not performed.     Medical Decision Making  The patient's presentation was of high complexity (a chronic illness severe exacerbation, progression, or side effect of treatment).    The patient's evaluation involved:  review of external note(s) from 2 sources (see separate area of note for details)    The patient's management necessitated moderate risk (limitations due to social determinants of health (inadequate community support)) and high risk (a decision regarding hospitalization).    Assessment & Plan    Patient is here from her group home as she was feeling distressed and needed to get away. She has poor coping and low frustration tolerance and tends to come to the ED to get her perceived help. She now has started to feel better and is requesting discharge so she can return to her group home. Patient appears at baseline. She can be  released.    I have reviewed the nursing notes. I have reviewed the findings, diagnosis, plan and need for follow up with the patient.    Discharge Medication List as of 8/28/2023  1:41 PM          Final diagnoses:   Acute reaction to stress   Intellectual disability       Magno Sena MD  [facility] EMERGENCY DEPARTMENT  8/28/2023     Magno Sena MD  08/28/23 2481

## 2023-08-28 NOTE — ED TRIAGE NOTES
Triage Assessment       Row Name 08/28/23 1241       Respiratory WDL    Respiratory WDL WDL       Skin Circulation/Temperature WDL    Skin Circulation/Temperature WDL WDL       Cardiac WDL    Cardiac WDL WDL       Peripheral/Neurovascular WDL    Peripheral Neurovascular WDL WDL

## 2023-08-28 NOTE — ED TRIAGE NOTES
Came in via medical cab pt states she is in distress she wants to damage her body by biting or cutting denies SIB in the last 3 days. Denies AH/VH.

## 2023-08-29 ENCOUNTER — HOSPITAL ENCOUNTER (EMERGENCY)
Facility: CLINIC | Age: 23
Discharge: HOME | End: 2023-08-29
Attending: EMERGENCY MEDICINE | Admitting: EMERGENCY MEDICINE
Payer: MEDICARE

## 2023-08-29 VITALS
HEART RATE: 95 BPM | DIASTOLIC BLOOD PRESSURE: 69 MMHG | TEMPERATURE: 97.2 F | SYSTOLIC BLOOD PRESSURE: 108 MMHG | RESPIRATION RATE: 16 BRPM | OXYGEN SATURATION: 96 %

## 2023-08-29 DIAGNOSIS — F79 INTELLECTUAL DISABILITY: ICD-10-CM

## 2023-08-29 DIAGNOSIS — F60.3 BORDERLINE PERSONALITY DISORDER (H): ICD-10-CM

## 2023-08-29 PROCEDURE — 99283 EMERGENCY DEPT VISIT LOW MDM: CPT

## 2023-08-29 PROCEDURE — 99283 EMERGENCY DEPT VISIT LOW MDM: CPT | Performed by: EMERGENCY MEDICINE

## 2023-08-29 ASSESSMENT — ACTIVITIES OF DAILY LIVING (ADL): ADLS_ACUITY_SCORE: 37

## 2023-08-29 NOTE — ED PROVIDER NOTES
"ED Provider Note  [facility]      History     Chief Complaint   Patient presents with    Depression     Pt states she has been crying this week thinking about her dad.     HPI  Sadaf Ross is a 22 year old female with hx of bpd, intellectual disability who presents saying she has been missing her dad a lot this past week and so crying a lot. She says he passed away 3 years ago and they were close.  She will listen to old songs he liked from the 80s and 90s to think about him.  She has 2 sisters and a nephew and talking to her sisters help also.    Group home staff says she was at her baseline today and \"just says things so she can go to the hospital.\"  No si/hi.     Physical Exam   BP: 112/68  Pulse: 92  Temp: 97.2  F (36.2  C)  Resp: 18  SpO2: 99 %  Physical Exam  Vitals and nursing note reviewed.   Constitutional:       Appearance: She is obese.   HENT:      Head: Normocephalic and atraumatic.      Nose: No congestion or rhinorrhea.   Eyes:      Extraocular Movements: Extraocular movements intact.   Cardiovascular:      Rate and Rhythm: Normal rate.   Pulmonary:      Effort: Pulmonary effort is normal.   Musculoskeletal:         General: No signs of injury.      Cervical back: Normal range of motion.   Skin:     Coloration: Skin is not jaundiced or pale.   Neurological:      General: No focal deficit present.      Mental Status: She is alert and oriented to person, place, and time.   Psychiatric:         Attention and Perception: Attention and perception normal.         Mood and Affect: Mood is depressed.         Speech: Speech normal.         Behavior: Behavior normal. Behavior is cooperative.         Thought Content: Thought content normal.         Cognition and Memory: Cognition and memory normal.         Judgment: Judgment normal.           ED Course, Procedures, & Data      Procedures                     No results found for any visits on 08/29/23.  Medications - No data to " display  Labs Ordered and Resulted from Time of ED Arrival to Time of ED Departure - No data to display  No orders to display          Critical care was not performed.     Medical Decision Making  The patient's presentation was of moderate complexity (a chronic illness mild to moderate exacerbation, progression, or side effect of treatment).    The patient's evaluation involved:  an assessment requiring an independent historian (group home staff)  review of external note(s) from 3+ sources (numerous ed visits 5 in past week. )    The patient's management necessitated high risk (a decision regarding hospitalization).    Assessment & Plan    Sadaf Ross is a 22 year old female with hx of bpd, intellectual disability who presents saying she has been missing her dad a lot this past week and so crying a lot. She is well known to the ED and comes frequently for similar issues. She appears at baseline.  She was allowed to rest in the ED for a bit and then was discharged back to her group home to continue working with her current providers.     I have reviewed the nursing notes. I have reviewed the findings, diagnosis, plan and need for follow up with the patient.    New Prescriptions    No medications on file       Final diagnoses:   Borderline personality disorder (H)   Intellectual disability       Ashely Toth MD  [facility] EMERGENCY DEPARTMENT  8/29/2023     Ashely Toth MD  08/29/23 1304

## 2023-08-29 NOTE — ED NOTES
Pt has been searched. She is keeping her bag which has been searched. She has given us multiple packs of cigarettes, a lighter, ibuprofen, peptobismol and a battery pack. These have been secured and put away. She is keeping her phone

## 2023-08-29 NOTE — ED TRIAGE NOTES
Triage Assessment       Row Name 08/29/23 0601       Triage Assessment (Adult)    Airway WDL WDL       Respiratory WDL    Respiratory WDL WDL       Skin Circulation/Temperature WDL    Skin Circulation/Temperature WDL WDL       Cardiac WDL    Cardiac WDL WDL       Peripheral/Neurovascular WDL    Peripheral Neurovascular WDL WDL       Cognitive/Neuro/Behavioral WDL    Cognitive/Neuro/Behavioral WDL WDL

## 2023-09-02 NOTE — TELEPHONE ENCOUNTER
"S: chest pain    B: patient calling wondering if she should keep for appointment for tomorrow. She she is being seen tomorrow for stomach pain. Patient states she has had chest pain for a week. States it reaches across her chest. Denies shortness of breath, denies difficulty breathing. States she is sweating but says \"I sweat all the time.\".  She states the chest pain has been constant for a week.     A: chest pain for 1 week    R: protocol recommends to be seen today. Patient states her appointment is early in the morning. Patient advised to go to the ED if she develops difficulty breathing or if the chest pain radiates to her arms, shoulders or jaw. Patient verbalizes understand and agrees with the plan of care.     WILLIE RUDD RN      Reason for Disposition    Patient wants to be seen    Chest pain lasting longer than 5 minutes and occurred in last 3 days (72 hours) (Exception: feels exactly the same as previously diagnosed heartburn and has accompanying sour taste in mouth)    Additional Information    Negative: Patient says chest pain feels exactly the same as previously diagnosed 'heartburn' and describes burning in chest and accompanying sour taste in mouth    Negative: Fever > 100.4 F (38.0 C)    Negative: Chest pain(s) lasting a few seconds persists > 3 days    Negative: Rash in same area as pain (may be described as 'small blisters')    Negative: All other patients with chest pain (Exception: fleeting chest pain lasting a few seconds)    Negative: SEVERE difficulty breathing (e.g., struggling for each breath, speaks in single words)    Negative: Passed out (i.e., fainted, collapsed and was not responding)    Negative: Difficult to awaken or acting confused (e.g., disoriented, slurred speech)    Negative: Shock suspected (e.g., cold/pale/clammy skin, too weak to stand, low BP, rapid pulse)    Negative: Chest pain lasting longer than 5 minutes and ANY of the following:* Over 44 years old* Over 30 " years old and at least one cardiac risk factor (e.g., diabetes mellitus, high blood pressure, high cholesterol, smoker, or strong family history of heart disease)* History of heart disease (i.e., angina, heart attack, heart failure, bypass surgery, takes nitroglycerin)* Pain is crushing, pressure-like, or heavy    Negative: Heart beating < 50 beats per minute OR > 140 beats per minute    Negative: Visible sweat on face or sweat dripping down face    Negative: Sounds like a life-threatening emergency to the triager    Negative: Followed an injury to chest    Negative: SEVERE chest pain    Negative: Pain also in shoulder(s) or arm(s) or jaw    Negative: Difficulty breathing    Negative: Cocaine use within last 3 days    Negative: Major surgery in the past month    Negative: Hip or leg fracture (broken bone) in past month (or had cast on leg or ankle in past month)    Negative: Illness requiring prolonged bedrest in past month (e.g., immobilization, long hospital stay)    Negative: Long-distance travel in past month (e.g., car, bus, train, plane; with trip lasting 6 or more hours)    Negative: History of prior 'blood clot' in leg or lungs (i.e., deep vein thrombosis, pulmonary embolism)    Negative: History of inherited increased risk of blood clots (e.g., Factor 5 Leiden, Anti-thrombin 3, Protein C or Protein S deficiency, Prothrombin mutation)    Negative: Cancer treatment in the past two months (or has cancer now)    Negative: Heart beating irregularly or very rapidly    Protocols used: CHEST PAIN-A-OH       Attending Attestation (For Attendings USE Only)...

## 2023-09-05 ENCOUNTER — HOSPITAL ENCOUNTER (EMERGENCY)
Facility: CLINIC | Age: 23
Discharge: HOME | End: 2023-09-05
Admitting: EMERGENCY MEDICINE
Payer: MEDICARE

## 2023-09-05 VITALS
DIASTOLIC BLOOD PRESSURE: 79 MMHG | RESPIRATION RATE: 16 BRPM | SYSTOLIC BLOOD PRESSURE: 117 MMHG | HEART RATE: 90 BPM | OXYGEN SATURATION: 98 % | TEMPERATURE: 98.7 F

## 2023-09-05 PROCEDURE — 99281 EMR DPT VST MAYX REQ PHY/QHP: CPT

## 2023-09-05 NOTE — ED TRIAGE NOTES
Triage Assessment       Row Name 09/05/23 5788       Triage Assessment (Adult)    Airway WDL WDL       Respiratory WDL    Respiratory WDL WDL       Skin Circulation/Temperature WDL    Skin Circulation/Temperature WDL WDL       Cardiac WDL    Cardiac WDL WDL       Peripheral/Neurovascular WDL    Peripheral Neurovascular WDL WDL       Cognitive/Neuro/Behavioral WDL    Cognitive/Neuro/Behavioral WDL WDL

## 2023-09-06 ENCOUNTER — HOSPITAL ENCOUNTER (EMERGENCY)
Facility: CLINIC | Age: 23
Discharge: HOME | End: 2023-09-06
Attending: FAMILY MEDICINE | Admitting: FAMILY MEDICINE
Payer: MEDICARE

## 2023-09-06 VITALS
OXYGEN SATURATION: 99 % | SYSTOLIC BLOOD PRESSURE: 148 MMHG | RESPIRATION RATE: 16 BRPM | TEMPERATURE: 98.4 F | HEART RATE: 100 BPM | DIASTOLIC BLOOD PRESSURE: 92 MMHG

## 2023-09-06 DIAGNOSIS — F79 INTELLECTUAL DISABILITY: Chronic | ICD-10-CM

## 2023-09-06 DIAGNOSIS — R45.851 SUICIDAL IDEATION: ICD-10-CM

## 2023-09-06 DIAGNOSIS — F60.3 BORDERLINE PERSONALITY DISORDER (H): Chronic | ICD-10-CM

## 2023-09-06 PROCEDURE — 99285 EMERGENCY DEPT VISIT HI MDM: CPT | Performed by: FAMILY MEDICINE

## 2023-09-06 PROCEDURE — 250N000013 HC RX MED GY IP 250 OP 250 PS 637: Performed by: FAMILY MEDICINE

## 2023-09-06 PROCEDURE — 99283 EMERGENCY DEPT VISIT LOW MDM: CPT | Performed by: FAMILY MEDICINE

## 2023-09-06 RX ORDER — OLANZAPINE 10 MG/1
10 TABLET, ORALLY DISINTEGRATING ORAL ONCE
Status: COMPLETED | OUTPATIENT
Start: 2023-09-06 | End: 2023-09-06

## 2023-09-06 RX ADMIN — OLANZAPINE 10 MG: 10 TABLET, ORALLY DISINTEGRATING ORAL at 16:14

## 2023-09-06 ASSESSMENT — ACTIVITIES OF DAILY LIVING (ADL): ADLS_ACUITY_SCORE: 37
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
## 2023-09-06 NOTE — PLAN OF CARE
Problem: Depressive Symptoms  Goal: Depressive Symptoms  Signs and symptoms of listed problems will be absent or manageable.     Interventions to focus on decreasing symptoms of depression, decreasing self-injurious behaviors, elimination of suicidal ideation and elevation of mood. Additional interventions to focus on identifying and managing feelings, stress management, exercise, and healthy coping skills.    Outcome: Therapy, progress toward functional goals is gradual  48 hour nursing assessment: Pt evaluation continues. Assessed mood, anxiety, thoughts and behavior. Is progressing toward goals. Encourage participation in groups and developing healthy coping skills. Will continue current plan of care. Pt. states she does have SI/SIB thoughts but no specific plan On a 0 (low) and 10 (high) rating scale she rates her depression and anxiety as both 9. The patient does not appear to have such a high level of depression and anxiety. She expresses concern that her dad is mad at her but hasn't talked to him about this but discussed it with her mom. She has sheets and pillow cases removed from her room for safety reasons. No self harm such as biting observed. Refer to daily team meeting notes for individualized plan of care. Will continue to assess.       [facility].    Medication Name: Vit D3  Dosage: 2000 IU  Instructions:  Refills requested:     Date of last office visit: 6/22/23  Visit type: IN OFFICE  Provider seen: Dr. Painting     Follow up interval: 6 month  Date of most recent labs:     Additional Information pertaining to labs:      Future Appt Date and Time: 1/11/24

## 2023-09-11 ENCOUNTER — HOSPITAL ENCOUNTER (EMERGENCY)
Facility: CLINIC | Age: 23
Discharge: HOME | End: 2023-09-11
Attending: EMERGENCY MEDICINE | Admitting: EMERGENCY MEDICINE
Payer: MEDICARE

## 2023-09-11 ENCOUNTER — TRANSFERRED RECORDS (OUTPATIENT)
Dept: HEALTH INFORMATION MANAGEMENT | Facility: CLINIC | Age: 23
End: 2023-09-11

## 2023-09-11 VITALS
DIASTOLIC BLOOD PRESSURE: 86 MMHG | SYSTOLIC BLOOD PRESSURE: 126 MMHG | OXYGEN SATURATION: 98 % | HEART RATE: 80 BPM | RESPIRATION RATE: 18 BRPM

## 2023-09-11 DIAGNOSIS — F41.9 ANXIETY: ICD-10-CM

## 2023-09-11 PROCEDURE — 99284 EMERGENCY DEPT VISIT MOD MDM: CPT | Performed by: EMERGENCY MEDICINE

## 2023-09-11 PROCEDURE — 250N000013 HC RX MED GY IP 250 OP 250 PS 637: Performed by: EMERGENCY MEDICINE

## 2023-09-11 PROCEDURE — 250N000011 HC RX IP 250 OP 636: Mod: JZ | Performed by: EMERGENCY MEDICINE

## 2023-09-11 PROCEDURE — 96372 THER/PROPH/DIAG INJ SC/IM: CPT | Performed by: EMERGENCY MEDICINE

## 2023-09-11 RX ORDER — OLANZAPINE 10 MG/2ML
10 INJECTION, POWDER, FOR SOLUTION INTRAMUSCULAR ONCE
Status: COMPLETED | OUTPATIENT
Start: 2023-09-11 | End: 2023-09-11

## 2023-09-11 RX ORDER — OLANZAPINE 10 MG/2ML
10 INJECTION, POWDER, FOR SOLUTION INTRAMUSCULAR ONCE
Status: DISCONTINUED | OUTPATIENT
Start: 2023-09-11 | End: 2023-09-11

## 2023-09-11 RX ORDER — HYDROXYZINE HYDROCHLORIDE 50 MG/1
50 TABLET, FILM COATED ORAL ONCE
Status: COMPLETED | OUTPATIENT
Start: 2023-09-11 | End: 2023-09-11

## 2023-09-11 RX ORDER — IBUPROFEN 600 MG/1
600 TABLET, FILM COATED ORAL ONCE
Status: COMPLETED | OUTPATIENT
Start: 2023-09-11 | End: 2023-09-11

## 2023-09-11 RX ADMIN — OLANZAPINE 10 MG: 10 INJECTION, POWDER, FOR SOLUTION INTRAMUSCULAR at 20:35

## 2023-09-11 RX ADMIN — IBUPROFEN 600 MG: 600 TABLET ORAL at 19:37

## 2023-09-11 RX ADMIN — HYDROXYZINE HYDROCHLORIDE 50 MG: 50 TABLET, FILM COATED ORAL at 19:54

## 2023-09-11 ASSESSMENT — ACTIVITIES OF DAILY LIVING (ADL)
ADLS_ACUITY_SCORE: 37

## 2023-09-11 NOTE — ED NOTES
Bed: [facility]  Expected date:   Expected time:   Means of arrival:   Comments:  Gaby Aponte 723 21 y/o SI

## 2023-09-12 ENCOUNTER — MEDICAL CORRESPONDENCE (OUTPATIENT)
Dept: HEALTH INFORMATION MANAGEMENT | Facility: CLINIC | Age: 23
End: 2023-09-12
Payer: MEDICARE

## 2023-09-12 NOTE — ED NOTES
Sadaf is awake and cooperative. She expresses relief in headache. Wants to be discharged. No further behavioral exhibits after injections. Eating a sandwich and drinking water while she waits for transport

## 2023-09-12 NOTE — DISCHARGE INSTRUCTIONS
Please make an appointment to follow up with your therapist and/or Psychiatric Clinic (phone: [phone]) within 1-3 days.     Return to the ED if you are having worsening thoughts/feelings of harming yourself or others, or any urgent/life-threatening concerns.     DISCHARGE RESOURCES:  -[facility] [phone]   -[facility] [phone] or [phone].  -Crisis Intervention: [phone] or [phone] (TTY: [phone]).  Call anytime.  -Suicide Awareness Voices of Education (SAVE) (www.save.org): 318-834-ZZKT (8514)  -National Suicide Prevention Line (www.mentalhealthmn.org): 013-148-IVBU (6730)  -National [place] on Mental Illness (www.[place].celine.org): 152.709.1553 or 877-768-9537.  -Bcmz4tpgc: text the word LIFE to 07751 for immediate support and crisis intervention  -Mental Health Consumer/Survivor Network of [place] (www.mhcsn.net): 944.653.9937 or 027-971-2707  -Mental [employer] Association [place] (www.mentalhealth.org): 453.177.5252 or 903-877-7115

## 2023-09-12 NOTE — ED PROVIDER NOTES
ED Provider Note  [facility]      History     Chief Complaint   Patient presents with    Chest Pain    Suicidal     Chest pain, suicidal ideation     HPI  Sadaf Ross is a 22 year old female with a past medical history of ADHD, bipolar disorder, BPD, depression who presents to the ER for evaluation of chest pain, dizziness, and suicidal ideation. Patient is a regular here at the ED with this being her 18th visit within the last 30 days. She reports that she believes her chest pain is due to anxiety. She describes it as being a sharp pain in the middle of her chest. She reports that it began 5 minutes ago. She also notes having thoughts about biting herself, cutting herself, and hitting her head against a wall. She adds that she has not been sleeping well lately as she has been thinking about her father who passed away in 2021. To manage her anxiety, she has been taking Ativan as needed.      Past Medical History  Past Medical History:   Diagnosis Date    ADHD (attention deficit hyperactivity disorder)     Bipolar 1 disorder (H)     Borderline personality disorder (H)     Depression     Depressive disorder     Intellectual disability     Obesity     Syncope      Past Surgical History:   Procedure Laterality Date    APPENDECTOMY      APPENDECTOMY       acetaminophen (TYLENOL) 325 MG tablet  albuterol (PROAIR HFA/PROVENTIL HFA/VENTOLIN HFA) 108 (90 Base) MCG/ACT inhaler  ARIPiprazole lauroxil ER (ARISTADA) 882 MG/3.2ML intra-muscular  bisacodyl (DULCOLAX) 5 MG EC tablet  calcium carbonate (TUMS) 500 MG chewable tablet  cyclobenzaprine (FLEXERIL) 10 MG tablet  dicyclomine (BENTYL) 20 MG tablet  docusate sodium (COLACE) 50 MG capsule  famotidine (PEPCID) 20 MG tablet  FLUoxetine (PROZAC) 40 MG capsule  hydrocortisone, Perianal, (HYDROCORTISONE) 2.5 % cream  hydrOXYzine (ATARAX) 50 MG tablet  LORazepam (ATIVAN) 0.5 MG tablet  mupirocin (BACTROBAN) 2 % external ointment  OLANZapine zydis  (ZYPREXA) 5 MG ODT  ondansetron (ZOFRAN) 4 MG tablet  ondansetron (ZOFRAN) 4 MG tablet  pantoprazole (PROTONIX) 40 MG EC tablet  polyethylene glycol (MIRALAX) 17 GM/Dose powder  promethazine (PHENERGAN) 12.5 MG tablet  sennosides (SENOKOT) 8.6 MG tablet  traZODone (DESYREL) 50 MG tablet  Vitamin D, Cholecalciferol, 25 MCG (1000 UT) TABS      Allergies   Allergen Reactions    Penicillins Rash and Unknown     Family History  Family History   Problem Relation Age of Onset    Diabetes Type 1 Father     Cancer Paternal Grandfather      Social History   Social History     Tobacco Use    Smoking status: Every Day     Packs/day: 3.00     Years: 5.00     Pack years: 15.00     Types: Cigarettes    Smokeless tobacco: Never   Substance Use Topics    Alcohol use: No    Drug use: No      Past medical history, past surgical history, medications, allergies, family history, and social history were reviewed with the patient. No additional pertinent items.      A medically appropriate review of systems was performed with pertinent positives and negatives noted in the HPI, and all other systems negative.    Physical Exam      Physical Exam  Vitals and nursing note reviewed.   Constitutional:       General: She is not in acute distress.     Appearance: Normal appearance.   HENT:      Head: Normocephalic.      Nose: Nose normal.   Eyes:      Pupils: Pupils are equal, round, and reactive to light.   Cardiovascular:      Rate and Rhythm: Normal rate and regular rhythm.   Pulmonary:      Effort: Pulmonary effort is normal.   Abdominal:      General: There is no distension.   Musculoskeletal:         General: No deformity. Normal range of motion.      Cervical back: Normal range of motion.   Skin:     General: Skin is warm.   Neurological:      Mental Status: She is alert and oriented to person, place, and time.   Psychiatric:         Attention and Perception: Attention normal.         Mood and Affect: Mood is depressed. Affect is flat.          Speech: Speech is delayed.         Behavior: Behavior is slowed.         Judgment: Judgment is inappropriate.         ED Course, Procedures, & Data     Mental Health Risk Assessment        PSS-3      Date and Time Over the past 2 weeks have you felt down, depressed, or hopeless? Over the past 2 weeks have you had thoughts of killing yourself? Have you ever attempted to kill yourself? When did this last happen? User   09/11/23 1858 yes yes no -- MAR          C-SSRS ([place])      Date and Time Q1 Wished to be Dead (Past Month) Q2 Suicidal Thoughts (Past Month) Q3 Suicidal Thought Method Q4 Suicidal Intent without Specific Plan Q5 Suicide Intent with Specific Plan Q6 Suicide Behavior (Lifetime) Within the Past 3 Months? RETIRED: Level of Risk per Screen Screening Not Complete User   09/11/23 1858 yes yes yes no yes yes -- -- -- MAR                Item Assessment   Suicidal Ideation Suicidal intent (without specific plan)   Plan Bite self   Intent No intent now   Suicidal or self-harm behaviors Hit head against wall   Risk Factors Highly impulsive behavior   Protective Factors Pt has multiple visits for same symptoms.  Was medicated in ED and now denies SI.  Appears at baseline.  Has Close monitoring at  and extensive outpatient resources.          No results found for any visits on 09/11/23.  Medications   ibuprofen (ADVIL/MOTRIN) tablet 600 mg (600 mg Oral $Given 9/11/23 1937)   hydrOXYzine (ATARAX) tablet 50 mg (50 mg Oral $Given 9/11/23 1954)   OLANZapine (zyPREXA) injection 10 mg (10 mg Intramuscular $Given 9/11/23 2035)     Labs Ordered and Resulted from Time of ED Arrival to Time of ED Departure - No data to display  No orders to display          Critical care was not performed.     Medical Decision Making  The patient's presentation was of moderate complexity (a chronic illness mild to moderate exacerbation, progression, or side effect of treatment).    The patient's evaluation involved:  history and  "exam without other MDM data elements    The patient's management necessitated moderate risk (prescription drug management including medications given in the ED).    Assessment & Plan    Patient is well-known to the ED and presents for evaluation of anxiety.  Initially complained of chest pain but denies that to me and feels that any discomfort she had earlier was likely related to \"panic attack\".  She is feeling suicidal with plan to bite herself or hit her head against the wall.  We discussed medication treatment for anxiety and she is agreeable to this.  She was given dose of hydroxyzine.    While waiting in the ED, patient began to bite herself and hit head against the wall.  Ultimately, code 21 was called and she was given IM Zyprexa.  After medication, she now states that she feels much better and is requesting discharge.  On my reassessment, she denies any suicidality and does not have any medical complaints at this time.  I feel that she is at her baseline.  Plan for discharge back to group home.        I have reviewed the nursing notes. I have reviewed the findings, diagnosis, plan and need for follow up with the patient.    New Prescriptions    No medications on file       Final diagnoses:   Anxiety   I, Sergio Sargent, am serving as a trained medical scribe to document services personally performed by Ghassan Browning DO, based on the provider's statements to me.     I, Ghassan Browning DO, was physically present and have reviewed and verified the accuracy of this note documented by Sergio Sargent.      Ghassan Browning DO  [facility] EMERGENCY DEPARTMENT  9/11/2023     Ghassan Browning DO  09/11/23 2141    "

## 2023-09-12 NOTE — ED NOTES
"At 2025 patient began hitting head on the wall and biting her hand \"because I want to die\". [facility] team called and attempted to deescalate but patient continued behavior and began yelling that she was going to \"beat you if you touch me.\" She then continued behaviors. Code green called. Patient had brief hands on to safely give IM medications. She eventually ceased previous behavior after threatening again to beat staff and throwing her pillow and blanket at staff  "

## 2023-09-12 NOTE — ED NOTES
Patient sat up in bed and began biting her left hand, writer able to redirect patient. Patient then began banging her head on the wall 3 times. Writer able to redirect patient. Patient laying down in bed, RN notified.

## 2023-09-12 NOTE — ED TRIAGE NOTES
Pt complains of dizziness and chest pain while at MD office, also expresses suicidal thoughts.

## 2023-09-22 ENCOUNTER — HOSPITAL ENCOUNTER (EMERGENCY)
Facility: CLINIC | Age: 23
Discharge: HOME | End: 2023-09-22
Attending: EMERGENCY MEDICINE | Admitting: EMERGENCY MEDICINE
Payer: MEDICARE

## 2023-09-22 VITALS
OXYGEN SATURATION: 100 % | TEMPERATURE: 98.2 F | WEIGHT: 245.4 LBS | DIASTOLIC BLOOD PRESSURE: 83 MMHG | RESPIRATION RATE: 17 BRPM | HEART RATE: 99 BPM | SYSTOLIC BLOOD PRESSURE: 121 MMHG | BODY MASS INDEX: 43.21 KG/M2

## 2023-09-22 DIAGNOSIS — R19.7 DIARRHEA: ICD-10-CM

## 2023-09-22 PROCEDURE — 99282 EMERGENCY DEPT VISIT SF MDM: CPT | Performed by: EMERGENCY MEDICINE

## 2023-09-22 PROCEDURE — 99283 EMERGENCY DEPT VISIT LOW MDM: CPT | Performed by: EMERGENCY MEDICINE

## 2023-09-22 PROCEDURE — 99283 EMERGENCY DEPT VISIT LOW MDM: CPT | Mod: GC | Performed by: EMERGENCY MEDICINE

## 2023-09-22 ASSESSMENT — ENCOUNTER SYMPTOMS
CONSTIPATION: 0
FEVER: 0
VOMITING: 0
ABDOMINAL PAIN: 0
CONFUSION: 0
FATIGUE: 0
HEMATURIA: 0
CHILLS: 0
NAUSEA: 0
ABDOMINAL DISTENTION: 0
AGITATION: 0
DIARRHEA: 1
BLOOD IN STOOL: 0
SHORTNESS OF BREATH: 0
DYSPHORIC MOOD: 0
ANAL BLEEDING: 0
FLANK PAIN: 0
DYSURIA: 0
HALLUCINATIONS: 0
DIFFICULTY URINATING: 0
FREQUENCY: 0
PALPITATIONS: 0
RECTAL PAIN: 0

## 2023-09-22 ASSESSMENT — ACTIVITIES OF DAILY LIVING (ADL): ADLS_ACUITY_SCORE: 37

## 2023-09-22 NOTE — ED TRIAGE NOTES
Arrives via EMS for diarrhea that started yesterday around 5-6 pm. States she has went to the bathroom 9 times since then     Triage Assessment       Row Name 09/22/23 3387       Triage Assessment (Adult)    Airway WDL WDL       Respiratory WDL    Respiratory WDL WDL       Skin Circulation/Temperature WDL    Skin Circulation/Temperature WDL WDL       Cardiac WDL    Cardiac WDL WDL       Peripheral/Neurovascular WDL    Peripheral Neurovascular WDL WDL       Cognitive/Neuro/Behavioral WDL    Cognitive/Neuro/Behavioral WDL WDL

## 2023-09-22 NOTE — DISCHARGE INSTRUCTIONS
As discussed, you can be discharged. Please keep watching your symptoms as instructed if you have any new symptoms. You can take over the counter medications if your diarrhea comes back, and follow-up with your outpatient provider in one week.

## 2023-09-22 NOTE — ED NOTES
Bed: MIKE-ASHLYN  Expected date: 9/22/23  Expected time:   Means of arrival:   Comments:  [id number] 21 y/o F   Diarrhea

## 2023-09-22 NOTE — ED PROVIDER NOTES
ED Provider Note  [facility]      History     Chief Complaint   Patient presents with    Diarrhea     Pt arrives due to diarrhea that started yesterday.       HPI  Sadaf Ross is a 22 year old female who has a history of borderline personality disorder, bipolar disorder, ADHD, intellectual disability, and appendicectomy, who presents with concerns of diarrhea. The patient notes that she has had 9 bowel movements yesterday evening, with watery stools for which she has taken pepto bismol and her diarrhea has improved today. She denies seeing any blood or mucus in her stools, and denies any abdominal pain, nausea, and vomiting. She denies cardiopulmonary and  symptoms and says her mood is fine, denying any homicidal or suicidal ideation    Past Medical History  Past Medical History:   Diagnosis Date    ADHD (attention deficit hyperactivity disorder)     Bipolar 1 disorder (H)     Borderline personality disorder (H)     Depression     Depressive disorder     Intellectual disability     Obesity     Syncope      Past Surgical History:   Procedure Laterality Date    APPENDECTOMY      APPENDECTOMY       acetaminophen (TYLENOL) 325 MG tablet  albuterol (PROAIR HFA/PROVENTIL HFA/VENTOLIN HFA) 108 (90 Base) MCG/ACT inhaler  ARIPiprazole lauroxil ER (ARISTADA) 882 MG/3.2ML intra-muscular  bisacodyl (DULCOLAX) 5 MG EC tablet  calcium carbonate (TUMS) 500 MG chewable tablet  cyclobenzaprine (FLEXERIL) 10 MG tablet  dicyclomine (BENTYL) 20 MG tablet  docusate sodium (COLACE) 50 MG capsule  famotidine (PEPCID) 20 MG tablet  FLUoxetine (PROZAC) 40 MG capsule  hydrocortisone, Perianal, (HYDROCORTISONE) 2.5 % cream  hydrOXYzine (ATARAX) 50 MG tablet  LORazepam (ATIVAN) 0.5 MG tablet  mupirocin (BACTROBAN) 2 % external ointment  OLANZapine zydis (ZYPREXA) 5 MG ODT  ondansetron (ZOFRAN) 4 MG tablet  ondansetron (ZOFRAN) 4 MG tablet  pantoprazole (PROTONIX) 40 MG EC tablet  polyethylene glycol  (MIRALAX) 17 GM/Dose powder  promethazine (PHENERGAN) 12.5 MG tablet  sennosides (SENOKOT) 8.6 MG tablet  traZODone (DESYREL) 50 MG tablet  Vitamin D, Cholecalciferol, 25 MCG (1000 UT) TABS      Allergies   Allergen Reactions    Penicillins Rash and Unknown     Family History  Family History   Problem Relation Age of Onset    Diabetes Type 1 Father     Cancer Paternal Grandfather      Social History   Social History     Tobacco Use    Smoking status: Every Day     Packs/day: 3.00     Years: 5.00     Pack years: 15.00     Types: Cigarettes    Smokeless tobacco: Never   Substance Use Topics    Alcohol use: No    Drug use: No         Review of Systems   Constitutional:  Negative for chills, fatigue and fever.   Respiratory:  Negative for shortness of breath.    Cardiovascular:  Negative for chest pain and palpitations.   Gastrointestinal:  Positive for diarrhea. Negative for abdominal distention, abdominal pain, anal bleeding, blood in stool, constipation, nausea, rectal pain and vomiting.   Genitourinary:  Negative for difficulty urinating, dysuria, flank pain, frequency, hematuria, menstrual problem and urgency.   Psychiatric/Behavioral:  Negative for agitation, behavioral problems, confusion, dysphoric mood, hallucinations and suicidal ideas.        Physical Exam   BP: 121/83  Pulse: 99  Temp: 98.2  F (36.8  C)  Resp: 17  Weight: 111.3 kg (245 lb 6.4 oz)  SpO2: 100 %  Physical Exam  Constitutional:       Appearance: Normal appearance. She is obese.   Cardiovascular:      Pulses: Normal pulses.      Heart sounds: Normal heart sounds. No murmur heard.  Pulmonary:      Effort: Pulmonary effort is normal. No respiratory distress.      Breath sounds: Normal breath sounds. No wheezing.   Abdominal:      General: Bowel sounds are normal.      Palpations: There is no mass.      Tenderness: There is no abdominal tenderness. There is no right CVA tenderness, left CVA tenderness, guarding or rebound. Negative signs include  Santana's sign, Rovsing's sign and McBurney's sign.   Musculoskeletal:      Cervical back: Tenderness present.   Neurological:      General: No focal deficit present.      Mental Status: She is oriented to person, place, and time.   Psychiatric:         Mood and Affect: Mood normal.         ED Course, Procedures, & Data      Procedures             No results found for any visits on 09/22/23.  Medications - No data to display  Labs Ordered and Resulted from Time of ED Arrival to Time of ED Departure - No data to display  No orders to display          Critical care was not performed.     Medical Decision Making  The patient's presentation was of straightforward complexity (a clearly self-limited or minor problem).    The patient's evaluation involved:  history and exam without other MDM data elements    The patient's management necessitated only low risk treatment.    Assessment & Plan    Sadaf Ross is a 22 year old female who has a history of borderline personality disorder, bipolar disorder, ADHD, intellectual disability, and appendicectomy, who presents with concerns of diarrhea that has been improving. Abdominal and Cardiopulmonary physical exam is normal. Differential includes etiologies of most likely gastroenteritis.  No labs were done and the patient asked to be discharged, saying that she feels much better.    I have reviewed the nursing notes. I have reviewed the findings, diagnosis, plan and need for follow up with the patient.    Discharge Medication List as of 9/22/2023  5:11 PM          Final diagnoses:   Diarrhea     Ramez Dagher, MD  PGY1 Resident      [facility] EMERGENCY DEPARTMENT  9/22/2023    --    ED Attending Physician Attestation    I VEDA CAMEJO MD, cared for this patient with the Resident. I have performed a history and physical examination of the patient and discussed management with the resident. I reviewed the resident's documentation above and agree with the  documented findings and plan of care.    Summary of HPI, PE, ED Course   Patient is a 22 year old female evaluated in the emergency department for diarrhea and borderline personality disorder. Exam and ED course notable for an entirely normal exam with a benign abdominal exam. After the completion of care in the emergency department, the patient was discharged.    Critical Care & Procedures  Not applicable.        Medical Decision Making  The patient's presentation was of straightforward complexity (a clearly self-limited or minor problem).    The patient's evaluation involved:  history and exam without other MDM data elements    The patient's management necessitated only low risk treatment.          VEDA PACHECO MD  Emergency Medicine       Veda Pacheco MD  09/22/23 2024

## 2023-09-25 ENCOUNTER — HOSPITAL ENCOUNTER (EMERGENCY)
Facility: CLINIC | Age: 23
Discharge: HOME | End: 2023-09-25
Attending: EMERGENCY MEDICINE | Admitting: EMERGENCY MEDICINE
Payer: MEDICARE

## 2023-09-25 VITALS
SYSTOLIC BLOOD PRESSURE: 133 MMHG | RESPIRATION RATE: 16 BRPM | WEIGHT: 245 LBS | TEMPERATURE: 98.7 F | BODY MASS INDEX: 43.41 KG/M2 | DIASTOLIC BLOOD PRESSURE: 83 MMHG | OXYGEN SATURATION: 99 % | HEART RATE: 94 BPM | HEIGHT: 63 IN

## 2023-09-25 DIAGNOSIS — R11.2 NAUSEA VOMITING AND DIARRHEA: ICD-10-CM

## 2023-09-25 DIAGNOSIS — R19.7 NAUSEA VOMITING AND DIARRHEA: ICD-10-CM

## 2023-09-25 DIAGNOSIS — F98.4 HEAD-BANGING: ICD-10-CM

## 2023-09-25 LAB
ALBUMIN SERPL BCG-MCNC: 4.9 G/DL (ref 3.5–5.2)
ALP SERPL-CCNC: 76 U/L (ref 35–104)
ALT SERPL W P-5'-P-CCNC: 17 U/L (ref 0–50)
ANION GAP SERPL CALCULATED.3IONS-SCNC: 10 MMOL/L (ref 7–15)
AST SERPL W P-5'-P-CCNC: 15 U/L (ref 0–45)
BASOPHILS # BLD AUTO: 0.1 10E3/UL (ref 0–0.2)
BASOPHILS NFR BLD AUTO: 1 %
BILIRUB DIRECT SERPL-MCNC: <0.2 MG/DL (ref 0–0.3)
BILIRUB SERPL-MCNC: 0.3 MG/DL
BUN SERPL-MCNC: 7.7 MG/DL (ref 6–20)
CALCIUM SERPL-MCNC: 9.6 MG/DL (ref 8.6–10)
CHLORIDE SERPL-SCNC: 106 MMOL/L (ref 98–107)
CREAT SERPL-MCNC: 0.87 MG/DL (ref 0.51–0.95)
DEPRECATED HCO3 PLAS-SCNC: 24 MMOL/L (ref 22–29)
EGFRCR SERPLBLD CKD-EPI 2021: >90 ML/MIN/1.73M2
EOSINOPHIL # BLD AUTO: 0.1 10E3/UL (ref 0–0.7)
EOSINOPHIL NFR BLD AUTO: 1 %
ERYTHROCYTE [DISTWIDTH] IN BLOOD BY AUTOMATED COUNT: 13.2 % (ref 10–15)
GLUCOSE SERPL-MCNC: 91 MG/DL (ref 70–99)
HCT VFR BLD AUTO: 38.6 % (ref 35–47)
HGB BLD-MCNC: 13.2 G/DL (ref 11.7–15.7)
HOLD SPECIMEN: NORMAL
IMM GRANULOCYTES # BLD: 0 10E3/UL
IMM GRANULOCYTES NFR BLD: 0 %
LACTATE SERPL-SCNC: 0.7 MMOL/L (ref 0.7–2)
LIPASE SERPL-CCNC: 43 U/L (ref 13–60)
LYMPHOCYTES # BLD AUTO: 1.9 10E3/UL (ref 0.8–5.3)
LYMPHOCYTES NFR BLD AUTO: 22 %
MCH RBC QN AUTO: 28.5 PG (ref 26.5–33)
MCHC RBC AUTO-ENTMCNC: 34.2 G/DL (ref 31.5–36.5)
MCV RBC AUTO: 83 FL (ref 78–100)
MONOCYTES # BLD AUTO: 0.3 10E3/UL (ref 0–1.3)
MONOCYTES NFR BLD AUTO: 3 %
NEUTROPHILS # BLD AUTO: 6.4 10E3/UL (ref 1.6–8.3)
NEUTROPHILS NFR BLD AUTO: 73 %
NRBC # BLD AUTO: 0 10E3/UL
NRBC BLD AUTO-RTO: 0 /100
PLATELET # BLD AUTO: 262 10E3/UL (ref 150–450)
POTASSIUM SERPL-SCNC: 4.7 MMOL/L (ref 3.4–5.3)
PROT SERPL-MCNC: 8 G/DL (ref 6.4–8.3)
RBC # BLD AUTO: 4.63 10E6/UL (ref 3.8–5.2)
SODIUM SERPL-SCNC: 140 MMOL/L (ref 136–145)
WBC # BLD AUTO: 8.8 10E3/UL (ref 4–11)

## 2023-09-25 PROCEDURE — 250N000013 HC RX MED GY IP 250 OP 250 PS 637: Performed by: FAMILY MEDICINE

## 2023-09-25 PROCEDURE — 83605 ASSAY OF LACTIC ACID: CPT

## 2023-09-25 PROCEDURE — 85004 AUTOMATED DIFF WBC COUNT: CPT

## 2023-09-25 PROCEDURE — 99291 CRITICAL CARE FIRST HOUR: CPT | Mod: GC | Performed by: EMERGENCY MEDICINE

## 2023-09-25 PROCEDURE — 83690 ASSAY OF LIPASE: CPT

## 2023-09-25 PROCEDURE — 36415 COLL VENOUS BLD VENIPUNCTURE: CPT

## 2023-09-25 PROCEDURE — 82248 BILIRUBIN DIRECT: CPT

## 2023-09-25 PROCEDURE — 99284 EMERGENCY DEPT VISIT MOD MDM: CPT | Performed by: EMERGENCY MEDICINE

## 2023-09-25 RX ORDER — ONDANSETRON 2 MG/ML
4 INJECTION INTRAMUSCULAR; INTRAVENOUS EVERY 30 MIN PRN
Status: DISCONTINUED | OUTPATIENT
Start: 2023-09-25 | End: 2023-09-25 | Stop reason: HOSPADM

## 2023-09-25 RX ORDER — LOPERAMIDE HCL 2 MG
2 CAPSULE ORAL 4 TIMES DAILY PRN
Status: DISCONTINUED | OUTPATIENT
Start: 2023-09-25 | End: 2023-09-25 | Stop reason: HOSPADM

## 2023-09-25 RX ORDER — OLANZAPINE 5 MG/1
5 TABLET, ORALLY DISINTEGRATING ORAL ONCE
Status: COMPLETED | OUTPATIENT
Start: 2023-09-25 | End: 2023-09-25

## 2023-09-25 RX ADMIN — OLANZAPINE 5 MG: 5 TABLET, ORALLY DISINTEGRATING ORAL at 15:04

## 2023-09-25 ASSESSMENT — ENCOUNTER SYMPTOMS: DIARRHEA: 1

## 2023-09-25 ASSESSMENT — ACTIVITIES OF DAILY LIVING (ADL)
ADLS_ACUITY_SCORE: 35
ADLS_ACUITY_SCORE: 35
ADLS_ACUITY_SCORE: 37

## 2023-09-25 NOTE — ED NOTES
Pt head banged shortly after arrival.  Pt was given 1:1 staff and bit herself x2 and not able to be redirected.  Code 21 was called and pt agreed to take zyprexa to avoid any hands on.  Pt did take zyprexa willingly and was placed on 2:1 with [facility] time included to provide more care.

## 2023-09-25 NOTE — ED TRIAGE NOTES
Diarrhea x 2 weeks. 9-10 stools a day. Watery stool.     Triage Assessment       Row Name 09/25/23 6031       Triage Assessment (Adult)    Airway WDL WDL       Respiratory WDL    Respiratory WDL WDL       Skin Circulation/Temperature WDL    Skin Circulation/Temperature WDL WDL       Cardiac WDL    Cardiac WDL WDL       Peripheral/Neurovascular WDL    Peripheral Neurovascular WDL WDL       Cognitive/Neuro/Behavioral WDL    Cognitive/Neuro/Behavioral WDL WDL

## 2023-09-25 NOTE — ED PROVIDER NOTES
"ED Provider Note  [facility]      History   No chief complaint on file.      Diarrhea    Sadaf Ross is a 22 year old female who has a history of borderline personality disorder, bipolar disorder, ADHD, intellectual disability, and appendicectomy, who presents with concerns of diarrhea. The patient was seen in the ED on 09/22 for 9 episodes of watery diarrhea that had improved when she came to the ED that day. Her clinical abdominal and cardiopulmonary exam was normal and she was discharged without any labs.  Patient also presents today with concerns of watery diarrhea (on average 8 episodes per day, recently had 3 episodes today). She denied seeing any blood or mucus in her stools, and denies any abdominal pain, but mentions having two episodes of vomiting today as well as nausea. She denies cardiopulmonary and  symptoms, and LMR was a few months ago as she says she is on Depo injection.  Patient says her mood is \"angry\", and mentions having suicidal ideations over the past 24 hours as well as SIB ideations (ideas of hitting her head on the wall). She denies having any trigger for those ideations.    Past Medical History  Past Medical History:   Diagnosis Date    ADHD (attention deficit hyperactivity disorder)     Bipolar 1 disorder (H)     Borderline personality disorder (H)     Depression     Depressive disorder     Intellectual disability     Obesity     Syncope      Past Surgical History:   Procedure Laterality Date    APPENDECTOMY      APPENDECTOMY       acetaminophen (TYLENOL) 325 MG tablet  albuterol (PROAIR HFA/PROVENTIL HFA/VENTOLIN HFA) 108 (90 Base) MCG/ACT inhaler  ARIPiprazole lauroxil ER (ARISTADA) 882 MG/3.2ML intra-muscular  bisacodyl (DULCOLAX) 5 MG EC tablet  calcium carbonate (TUMS) 500 MG chewable tablet  cyclobenzaprine (FLEXERIL) 10 MG tablet  dicyclomine (BENTYL) 20 MG tablet  docusate sodium (COLACE) 50 MG capsule  famotidine (PEPCID) 20 MG " tablet  FLUoxetine (PROZAC) 40 MG capsule  hydrocortisone, Perianal, (HYDROCORTISONE) 2.5 % cream  hydrOXYzine (ATARAX) 50 MG tablet  LORazepam (ATIVAN) 0.5 MG tablet  mupirocin (BACTROBAN) 2 % external ointment  OLANZapine zydis (ZYPREXA) 5 MG ODT  ondansetron (ZOFRAN) 4 MG tablet  ondansetron (ZOFRAN) 4 MG tablet  pantoprazole (PROTONIX) 40 MG EC tablet  polyethylene glycol (MIRALAX) 17 GM/Dose powder  promethazine (PHENERGAN) 12.5 MG tablet  sennosides (SENOKOT) 8.6 MG tablet  traZODone (DESYREL) 50 MG tablet  Vitamin D, Cholecalciferol, 25 MCG (1000 UT) TABS      Allergies   Allergen Reactions    Penicillins Rash and Unknown     Family History  Family History   Problem Relation Age of Onset    Diabetes Type 1 Father     Cancer Paternal Grandfather      Social History   Social History     Tobacco Use    Smoking status: Every Day     Packs/day: 3.00     Years: 5.00     Pack years: 15.00     Types: Cigarettes    Smokeless tobacco: Never   Substance Use Topics    Alcohol use: No    Drug use: No         A medically appropriate review of systems was performed with pertinent positives and negatives noted in the HPI, and all other systems negative.    Physical Exam      Physical Exam  Constitutional:       General: She is not in acute distress.     Appearance: She is obese. She is not ill-appearing.   Cardiovascular:      Rate and Rhythm: Normal rate and regular rhythm.      Heart sounds: No murmur heard.     No friction rub.   Pulmonary:      Effort: No respiratory distress.      Breath sounds: Normal breath sounds. No wheezing, rhonchi or rales.   Abdominal:      General: Bowel sounds are normal.      Palpations: There is no hepatomegaly, mass or pulsatile mass.      Tenderness: There is abdominal tenderness (mild) in the suprapubic area. There is no right CVA tenderness, left CVA tenderness, guarding or rebound. Negative signs include Santana's sign, Rovsing's sign and McBurney's sign.   Skin:     Coloration: Skin is  not jaundiced.      Findings: No erythema or rash.   Neurological:      Mental Status: She is oriented to person, place, and time. Mental status is at baseline.      Motor: No weakness.         ED Course, Procedures, & Data      Procedures                     No results found for any visits on 09/25/23.  Medications - No data to display  Labs Ordered and Resulted from Time of ED Arrival to Time of ED Departure - No data to display  No orders to display              Assessment & Plan    Sadaf Ross is a 22 year old female who has a history of borderline personality disorder, bipolar disorder, ADHD, intellectual disability, and appendicectomy, who presents with concerns of diarrhea and vomiting. Differential includes etiologies of most likely gastroenteritis as well as suicidal ideations. Clinical examination was only relevant for mild tenderness in the suprapubic area of the abdomen.  A DEC assessment was consulted due to the suicidal and SIB ideations. She was agitated, a code 21 was called and the patient received Olanzapine ODT 5mg.  Labs were drawn and CBC, BMP, Hepatic Function, Lipas and lactic acid levels came back       I have reviewed the nursing notes. I have reviewed the findings, diagnosis, plan and need for follow up with the patient.    New Prescriptions    No medications on file       Final diagnoses:   None   Ramez Dagher, MD  PGY-1 Resident      -------------------    --    ED Attending Physician Attestation    I Saul Milner MD, cared for this patient with the Resident. I have performed a history and physical examination of the patient and discussed management with the resident. I reviewed the resident's documentation above and agree with the documented findings and plan of care.    Summary of HPI, PE, ED Course   Patient is a 22 year old female evaluated in the emergency department for persistent nausea vomiting diarrhea without fever, chills.     Exam and ED course notable for generally  no acute distress, tearful, soft nontender nondistended abdomen.     Reexam at 3 PM: Patient upset, tearful, head-banging, yelling        Critical Care & Procedures    Critical Care Addendum  My initial assessment, based on my review of vital signs, focused history, physical exam, and discussion with Code 21 team , established a high suspicion that Sadaf Ross has  acute behavioral self-harm behaviors in the emergency department such as head-banging and suicidal ideation , which requires immediate intervention, and therefore she is critically ill.     After the initial assessment, the care team initiated medication therapy with Zyprexa and consulted with DEC  and code 21 team  to provide stabilization care. Due to the critical nature of this patient, I reassessed vital signs, physical exam, mental status, and respiratory status multiple times prior to her disposition.     Time also spent performing documentation, reviewing test results, discussion with consultants, and coordination of care.     Critical care time (excluding teaching time and procedures): 35 minutes.         Medical Decision Making    Subacute to chronic GI symptoms.  Given reported nausea vomiting and diarrhea and second visit in 3 days, will screen for electrolyte abnormality, dehydration, pancreatitis/hepatitis   -labs, IV fluids, as needed nausea medication    Acute behavioral self-harm behaviors, code 21 team summoned to the bedside at 3 PM  -Code 21 team, de-escalation verbally, Zyprexa orally    330pm: Patient Colmer and taking p.o. fluids.  Still pending IV access or lab draw.      430pm: Will sign out pending reassessment from DEC as well as laboratory testing    5pm: Labs show normal lipase as well as lactate.  Taking p.o. fluids.  Medically clear.  Initially was cleared for discharge prior to head-banging episode;  will reassess from behavioral standpoint for ultimate disposition    530pm: Discussed with DEC  who  reevaluated patient and agrees with my assessment that patient does not meet criteria for inpatient admission nor ED observation as she is clinically calm and going back to a group home setting          Saul Milner MD  Emergency Medicine     [facility] EMERGENCY DEPARTMENT  9/25/2023     Saul Milner MD  09/25/23 1638       Saul Milner MD  09/25/23 1708       Saul Milner MD  09/25/23 172

## 2023-09-25 NOTE — DISCHARGE INSTRUCTIONS
"Aftercare Plan  If I am feeling unsafe or I am in a crisis, I will:   Contact my established care providers:  Dr. Jess Wilkins MD @ [facility], # [phone]  Lynne Sawyer, therapist @ [facility], # [phone]  Call the National Suicide Prevention Lifeline: 988  Go to the nearest emergency room   Call 911     Warning signs that I or other people might notice when a crisis is developing for me: I am sad, quiet, not talking much, others will ask what is wrong.    Group Home helps to keep all sharp objects and medications not in reach of patient. This is helpful in keeping pt safe.     Things I am able to do on my own to cope or help me feel better: listening to music, cleaning, distractions for my mind, taking a shower.     Things that I am able to do with others to cope or help me better: talking with others     Things I can use or do for distraction: cleaning, organizing, listen to music     Changes I can make to support my mental health and wellness: Try my best not to think about suicidal thoughts; distract myself. I can also see my therapist again.     People in my life that I can ask for help: Roommates at Group Home, my sister, my mom, my friend Marcia.      Your [facility] has a mental health crisis team you can call 24/7: [facility]  [phone]     Additional resources and information:   Contact therapist to schedule an appt and re-start regular visits.   Therapist - Lynne Sawyer, # [phone]  [facility] - [facility]    Crisis Lines  Crisis Text Line  Text [phone]  You will be connected with a trained live crisis counselor to provide support.    [employer]  Fast Tracker  Linking people to mental health and substance use disorder resources  fasttrackermn.org     [place] Mental Health Warm Line  Peer to peer support  Monday thru Saturday, 12 pm to 10 pm  [phone] or 1.652.150.8014  Text \"Support\" to 07397    National [employer] Mental Illness " MAGY)  161.161.3088 or 1.888.MAGY.HELPS    Mental Health Apps  My3  https://myAito Technologiespp.org/    VirtualHopeBox  https://Artoo/apps/virtual-hope-box/      Additional Information  Today you were seen by a licensed mental health professional through Triage and Transition services, [facility] ([facility])  for a crisis assessment in the Emergency Department at [facility].  It is recommended that you follow up with your established providers (mental health therapist, and primary care doctor - as relevant) as soon as possible. Coordinators from [facility] will be calling you in the next 24-48 hours to ensure that you have the resources you need.  You can also contact [facility] coordinators directly at [phone]. You may have been scheduled for or offered an appointment with a mental health provider. [facility] maintains an extensive network of licensed behavioral health providers to connect patients with the services they need.  We do not charge providers a fee to participate in our referral network.  We match patients with providers based on a patient's specific needs, insurance coverage, and location.  Our first effort will be to refer you to a provider within your care system, and will utilize providers outside your care system as needed.      Mindfulness exercises can be helpful. See below for helpful ideas.    What is Mindfulness?   Mindfulness is an ancient eastern practice which is very relevant for our lives today.Mindfulness is a very simple concept. Mindfulness means paying attention in a particular way: on purpose, in the present moment, and non-judgementally.   Mindfulness does not conflict with any beliefs ortraditions, whether Congregational, cultural or scientific. It is simply a practical way to notice thoughts, physical sensations, sights, sounds, smells - anything we might not normally notice. The actual skillsmight be simple, but because it is so different to how our minds normally behave, it  "takes a lot of practice.   We might go out into the garden and as we look around, we might think \"That grassreally needs cutting, and that vegetable patch looks very untidy\". A young child on the other hand, will call over excitedly, \"Hey - come and look at this ant!\"   Mindfulness can simply be noticing whatwe don't normally notice, because our heads are too busy in the future or in the past - thinking about what we need to do, or going over what we have done.   Mindfulness might simply be described aschoosing and learning to control our focus of attention. Page 2 of 4 .Dealoelfhelp.co.uk/mindfulness.htm www.[employer].iStreamPlanet   Kim Pacheco 2009, permission to use for therapy purposes.     Mindful Breathing   The primary focus in Mindfulness Meditation is the breathing. However, the primary goal is a calm, non-judging awareness, allowing thoughts and feelings to come and go withoutgetting caught up in them. This creates calmness and acceptance.   ? Sit comfortably, with your eyes closed and your spine reasonably straight.   ? Direct your attention to your breathing.   ? When thoughts, emotions, physical feelings or external sounds occur, accept them, giving them the space to come and go without judging or getting involved with them.   ? When you notice that your attention has drifted off and is becoming caught up in thoughts or feelings,simply note that the attention has drifted, and then gently bring the attention back to your breathing.     It's ok and natural for thoughts to arise, and for your attention to follow them. No matterhow many times this happens, just keep bringing your attention back to your breathing.     Breathing Meditation 1 (Winston 1996)   Assume a comfortable posture lying on your back or sitting. If you are sitting, keep the spine straight and let your shoulders drop.   Close your eyes if itfeels comfortable.   Bring your attention to your belly, feeling it rise or expand gently on the in-breath and " fall or recede on the out-breath.   Keep your focus on the breathing,  being with? each in-breath for its full duration and with each out-breath for its full duration, as if you were riding the waves of your own breathing.   Every timeyou notice that your mind has wandered off the breath, notice what it was that took you away and then gently bring your attention back to your belly and the feeling of the breath coming in and out.   If your mind wanders away from the breath a thousand times, then your job is simply to bring it back to the breath every time, no matter what it becomes preoccupied with.   Practice this exercisefor fifteen minutes at a convenient time every day, whether you feel like it or not, for one week and see how it feels to incorporate a disciplined meditation practice into your life. Be aware of how itfeels to spend some time each day just being with your breath without having to do anything.Page 4 of 4 www.Risk I/O.co.uk/mindfulness.htm www.[employer]omaly.von Pacheco 2009, permission to use fortherapy purposes.     Breathing Meditation 2 (Winston 1996)   ? Tune into your breathing at different times during the day, feeling the belly go through one or two risings and fallings.   ? Become aware of your thoughts andfeelings at these moments, just observing them without judging them or yourself.   ? At the same time, be aware of any changes in the way you are seeing things and feeling about yourself.     Using mindfulness to cope with negative experiences (thoughts, feelings, events)   As we become morepractised at using mindfulness for breathing, body sensations and routine daily activities, so we can then learn to be mindful of our thoughts and feelings, to become observers, and then more accepting ofthem. This results in less distressing feelings, and increases our ability to enjoy our lives.   With mindfulness, even the most disturbing sensations, feelings, thoughts, and experiences, can be  "viewedfrom a wider perspective as passing events in the mind, rather than as \"us\", or as being necessarily true. (Josiah 2003)   When we are more practiced in using mindfulness, we can use it even in of intense distress, by becoming mindful of the actual experience as an observer, using mindful breathing and focussing our attention on the breathing, listening to the distressing thoughts mindfully,recognising them as merely thoughts, breathing with them, allowing them to happen without believing them or arguing with them. If thoughts are too strong or loud, then we can move our attention to ourbreath, the body, or to sounds around us.   Maxim Montero uses the example of waves to help explain mindfulness.   Think of your mind as the surface of a lake or an ocean. There are always waveson the water, sometimes big, sometimes small, sometimes almost imperceptible. The water's waves are churned up by winds, which come and go and vary in direction and intensity, just as do the winds of stressand change in our lives, which stir up waves in our mind. It's possible to find shelter from much of the wind that agitates the mind. Whatever we might do to prevent them, the winds of life and of the mind blow.   \"You can't stop the waves, but you can learn to surf\" (Winston 2004)  "

## 2023-09-25 NOTE — ED NOTES
"Aftercare Plan  If I am feeling unsafe or I am in a crisis, I will:   Contact my established care providers:  Dr. Jess Wilkins MD @ [facility], # [phone]  Lynne Sawyer, therapist @ [facility], # [phone]  Call the National Suicide Prevention Lifeline: 988  Go to the nearest emergency room   Call 911     Warning signs that I or other people might notice when a crisis is developing for me: I am sad, quiet, not talking much, others will ask what is wrong.    Group Home helps to keep all sharp objects and medications not in reach of patient. This is helpful in keeping pt safe.     Things I am able to do on my own to cope or help me feel better: listening to music, cleaning, distractions for my mind, taking a shower.     Things that I am able to do with others to cope or help me better: talking with others     Things I can use or do for distraction: cleaning, organizing, listen to music     Changes I can make to support my mental health and wellness: Try my best not to think about suicidal thoughts; distract myself. I can also see my therapist again.     People in my life that I can ask for help: Roommates at Group Home, my sister, my mom, my friend Marcia.      Your [facility] has a mental health crisis team you can call 24/7: [facility]  [phone]     Additional resources and information:   Contact therapist to schedule an appt and re-start regular visits.   Therapist - Lynne Sawyer, # [phone]  [facility] - [facility]    Crisis Lines  Crisis Text Line  Text 167760  You will be connected with a trained live crisis counselor to provide support.    [employer]  Fast Tracker  Linking people to mental health and substance use disorder resources  fasttrackermn.org     [place] Mental [facility] Warm Line  Peer to peer support  Monday thru Saturday, 12 pm to 10 pm  [phone] or 1.903.414.9016  Text \"Support\" to 03811    National [place] on Mental Illness " MAGY)  276.130.6797 or 1.888.MAGY.HELPS    Mental Health Apps  My3  https://myYaBeampp.org/    VirtualHopeBox  https://slinkset/apps/virtual-hope-box/      Additional Information  Today you were seen by a licensed mental health professional through Triage and Transition services, [facility] ([facility])  for a crisis assessment in the Emergency Department at [facility].  It is recommended that you follow up with your established providers (mental health therapist, and primary care doctor - as relevant) as soon as possible. Coordinators from [facility] will be calling you in the next 24-48 hours to ensure that you have the resources you need.  You can also contact [facility] coordinators directly at [phone]. You may have been scheduled for or offered an appointment with a mental health provider. [facility] maintains an extensive network of licensed behavioral health providers to connect patients with the services they need.  We do not charge providers a fee to participate in our referral network.  We match patients with providers based on a patient's specific needs, insurance coverage, and location.  Our first effort will be to refer you to a provider within your care system, and will utilize providers outside your care system as needed.      Mindfulness exercises can be helpful. See below for helpful ideas.    What is Mindfulness?   Mindfulness is an ancient eastern practice which is very relevant for our lives today.Mindfulness is a very simple concept. Mindfulness means paying attention in a particular way: on purpose, in the present moment, and non-judgementally.   Mindfulness does not conflict with any beliefs ortraditions, whether Jewish, cultural or scientific. It is simply a practical way to notice thoughts, physical sensations, sights, sounds, smells - anything we might not normally notice. The actual skillsmight be simple, but because it is so different to how our minds normally behave, it  "takes a lot of practice.   We might go out into the garden and as we look around, we might think \"That grassreally needs cutting, and that vegetable patch looks very untidy\". A young child on the other hand, will call over excitedly, \"Hey - come and look at this ant!\"   Mindfulness can simply be noticing whatwe don't normally notice, because our heads are too busy in the future or in the past - thinking about what we need to do, or going over what we have done.   Mindfulness might simply be described aschoosing and learning to control our focus of attention. Page 2 of 4 .[employer] Titanelfhelp.co.uk/mindfulness.htm www.[employer]   Kim Pacheco 2009, permission to use for therapy purposes.     Mindful Breathing   The primary focus in Mindfulness Meditation is the breathing. However, the primary goal is a calm, non-judging awareness, allowing thoughts and feelings to come and go withoutgetting caught up in them. This creates calmness and acceptance.   ? Sit comfortably, with your eyes closed and your spine reasonably straight.   ? Direct your attention to your breathing.   ? When thoughts, emotions, physical feelings or external sounds occur, accept them, giving them the space to come and go without judging or getting involved with them.   ? When you notice that your attention has drifted off and is becoming caught up in thoughts or feelings,simply note that the attention has drifted, and then gently bring the attention back to your breathing.     It's ok and natural for thoughts to arise, and for your attention to follow them. No matterhow many times this happens, just keep bringing your attention back to your breathing.     Breathing Meditation 1 (Winston 1996)   Assume a comfortable posture lying on your back or sitting. If you are sitting, keep the spine straight and let your shoulders drop.   Close your eyes if itfeels comfortable.   Bring your attention to your belly, feeling it rise or expand gently on the in-breath and " fall or recede on the out-breath.   Keep your focus on the breathing,  being with? each in-breath for its full duration and with each out-breath for its full duration, as if you were riding the waves of your own breathing.   Every timeyou notice that your mind has wandered off the breath, notice what it was that took you away and then gently bring your attention back to your belly and the feeling of the breath coming in and out.   If your mind wanders away from the breath a thousand times, then your job is simply to bring it back to the breath every time, no matter what it becomes preoccupied with.   Practice this exercisefor fifteen minutes at a convenient time every day, whether you feel like it or not, for one week and see how it feels to incorporate a disciplined meditation practice into your life. Be aware of how itfeels to spend some time each day just being with your breath without having to do anything.Page 4 of 4 www.[employer]usNet.co.uk/mindfulness.htm www.[employer].von Pacheco 2009, permission to use fortherapy purposes.     Breathing Meditation 2 (Winston 1996)   ? Tune into your breathing at different times during the day, feeling the belly go through one or two risings and fallings.   ? Become aware of your thoughts andfeelings at these moments, just observing them without judging them or yourself.   ? At the same time, be aware of any changes in the way you are seeing things and feeling about yourself.     Using mindfulness to cope with negative experiences (thoughts, feelings, events)   As we become morepractised at using mindfulness for breathing, body sensations and routine daily activities, so we can then learn to be mindful of our thoughts and feelings, to become observers, and then more accepting ofthem. This results in less distressing feelings, and increases our ability to enjoy our lives.   With mindfulness, even the most disturbing sensations, feelings, thoughts, and experiences, can be  "viewedfrom a wider perspective as passing events in the mind, rather than as \"us\", or as being necessarily true. (Josiah 2003)   When we are more practiced in using mindfulness, we can use it even in of intense distress, by becoming mindful of the actual experience as an observer, using mindful breathing and focussing our attention on the breathing, listening to the distressing thoughts mindfully,recognising them as merely thoughts, breathing with them, allowing them to happen without believing them or arguing with them. If thoughts are too strong or loud, then we can move our attention to ourbreath, the body, or to sounds around us.   Maxim Montero uses the example of waves to help explain mindfulness.   Think of your mind as the surface of a lake or an ocean. There are always waveson the water, sometimes big, sometimes small, sometimes almost imperceptible. The water's waves are churned up by winds, which come and go and vary in direction and intensity, just as do the winds of stressand change in our lives, which stir up waves in our mind. It's possible to find shelter from much of the wind that agitates the mind. Whatever we might do to prevent them, the winds of life and of the mind blow.   \"You can't stop the waves, but you can learn to surf\" (Winston 2004)    Daniel Freire, ZEESHAN, [facility]  Psychotherapist Trainee  [facility]  [email or web address]      "

## 2023-09-25 NOTE — CONSULTS
Diagnostic Evaluation Consultation  Crisis Assessment    Patient Name: Sadaf Ross  Age:  22 year old  Legal Sex: female  Race: White  Ethnicity: Not  or   Language: English    Patient was assessed: In person      Patient location: LT[facility], located within [facility] EMERGENCY DEPARTMENT                             ED12    Referral Data and Chief Complaint  Sadaf Ross presents to the ED by herself via cab. Patient is presenting to the ED for the following concerns: Anxiety, Depression, Suicidal ideation, Health stressors.   Factors that make the mental health crisis life threatening or complex are:  In addition to several mental health diagnosis, pt also has an intellectual disability and has lived in a group home for the past 3 years.    Informed Consent and Assessment Methods  Explained the crisis assessment process, including applicable information disclosures and limits to confidentiality, assessed understanding of the process, and obtained consent to proceed with the assessment.  Assessment methods included conducting a formal interview with patient, review of medical records, collaboration with medical staff, and obtaining relevant collateral information from family and community providers when available.     Patient response to interventions: acceptance expressed  Coping skills were attempted to reduce the crisis:   none used/needed when meeting with pt.      History of the Crisis   Pt reports eating poorly and not being able to keep anything down, in addition pt reports SI and depression, feelings of lonliness and missing her  father - who passed away in .   Pt has a significant history of mental health including: boderline personality disorder, adjustment disorder, bipolar, explosive personality disorder, ADHD, anxiety, depression. Pt also has an intellectual disability and struggles with suicidal ideation.     Brief Psychosocial History  Family:  Single, Children no  Support  System:  Facility resident(s)/Staff, Sibling(s), Parent(s)  Employment Status:  unemployed  Source of Income:  disability  Financial Environmental Concerns:  No concerns identified  Current Hobbies:  music  Barriers in Personal Life:  mental health concerns    Significant Clinical History  Current Anxiety Symptoms:  racing thoughts, excessive worry, anxious  Current Depression/Trauma:  hoplessness, sadness, thoughts of death/suicide  Current Somatic Symptoms:  somatic symptoms (abdominal pain, headache, tension), racing thoughts, excessive worry, anxious  Current Psychosis/Thought Disturbance:  impulsive, high risk behavior  Current Eating Symptoms:   none  Chemical Use History:  Alcohol: None  Benzodiazepines: None  Opiates: None  Cocaine: None  Marijuana: None   Past diagnosis:  ADHD, Anxiety Disorder, Bipolar Disorder, Depression, Personality Disorder, Suicide attempt(s)  Family history:  Anxiety Disorder, Depression - Dad and sister  Past treatment:  Individual therapy, Case management    Other relevant history:   Pt reports self injury behaviors such as: biting, hitting, cutting, and banging head on wall or object.    Collateral Information  Is there collateral information: Yes     Collateral information name, relationship, phone number:  Marcia King, friend, # [phone]    What happened today: Marcia is unsure what happened today as she has not spoken to pt today. Over the weekend she spoke with one of pt's roommates at the Group Home and they reported no concerns and stated she was busy cleaning.     Additional collateral information:  Marcia states that pt really misses her Dad, who passed away due to diabetes complications. When asked if pt has been consistent with her therapist, Marcia stated that she didn't really like her therapist because she would ignore her during visists and be on her phone. Marcia thought she wanted to have a new therapist. Pt did not indicate this, and agreed that she should make  an appt with her therapist soon.     Risk Assessment  [place] Suicide Severity Rating Scale Full Clinical Version:  Suicidal Ideation  Q1 Wish to be Dead (Lifetime): Yes  Q2 Non-Specific Active Suicidal Thoughts (Lifetime): Yes  3. Active Suicidal Ideation with any Methods (Not Plan) Without Intent to Act (Lifetime): Yes  Q4 Active Suicidal Ideation with Some Intent to Act, Without Specific Plan (Lifetime): Yes  Q5 Active Suicidal Ideation with Specific Plan and Intent (Lifetime): Yes  Q6 Suicide Behavior (Lifetime): yes     Suicidal Behavior (Lifetime)  Actual Attempt (Lifetime): Yes  Total Number of Actual Attempts (Lifetime): 5  Actual Attempt Description (Lifetime): Cutting wrist with metal pieces from cars and with keys.  Has subject engaged in non-suicidal self-injurious behavior? (Lifetime): Yes  Interrupted Attempts (Lifetime): No  Aborted or Self-Interrupted Attempt (Lifetime): Yes  Total Number of Aborted or Self-Interrupted Attempts (Lifetime): 5  Preparatory Acts or Behavior (Lifetime): No    [place] Suicide Severity Rating Scale Recent:   Suicidal Ideation (Recent)  Q1 Wished to be Dead (Past Month): yes  Q2 Suicidal Thoughts (Past Month): yes  Q3 Suicidal Thought Method: yes  Q4 Suicidal Intent without Specific Plan: yes  Q5 Suicide Intent with Specific Plan: yes  Within the Past 3 Months?: yes  Level of Risk per Screen: high risk  Intensity of Ideation (Recent)  Most Severe Ideation Rating (Past 1 Month): 4  Frequency (Past 1 Month): Many times each day  Duration (Past 1 Month): More than 8 hours/persistent or continuous  Controllability (Past 1 Month): Can control thoughts with a lot of difficulty  Deterrents (Past 1 Month): Deterrents most likely did not stop you  Reasons for Ideation (Past 1 Month): Completely to end or stop the pain (You couldn't go on living with the pain or how you were feeling)  Suicidal Behavior (Recent)  Actual Attempt (Past 3 Months): Yes  Total Number of Actual Attempts  (Past 3 Months): 1  Actual Attempt Description (Past 3 Months): Cutting wrists with sharp objects from car and keys.  Has subject engaged in non-suicidal self-injurious behavior? (Past 3 Months): Yes  Interrupted Attempts (Past 3 Months): No  Total Number of Interrupted Attempts (Past 3 Months): 1  Interrupted Attempt Description (Past 3 Months): Cutting wrists with sharp objects from car and keys.  Aborted or Self-Interrupted Attempt (Past 3 Months): Yes  Total Number of Aborted or Self-Interrupted Attempts (Past 3 Months): 1  Aborted or Self-Interrupted Attempt Description (Past 3 Months): Takes a lot of courage to stop myself and my thoughts. Pt reports still having reasons to live, mostly her sister and her 6 month old nephew.  Preparatory Acts or Behavior (Past 3 Months): No  Total Number of Preparatory Acts (Past 3 Months): 0    Environmental or Psychosocial Events: loss of a loved one, helplessness/hopelessness  Protective Factors: Protective Factors: strong bond to family unit, community support (group home) responsibilities and duties to others (pt loves to help others), lives in a responsibly safe and stable environment (group home), able to access care without barriers,and help seeking.    Does the patient have thoughts of harming others? Feels Like Hurting Others: no  Previous Attempt to Hurt Others: no  Is the patient engaging in sexually inappropriate behavior?: no    Is the patient engaging in sexually inappropriate behavior?  no        Mental Status Exam   Affect: Flat  Appearance: Appropriate  Attention Span/Concentration: Attentive  Eye Contact: Engaged    Fund of Knowledge: Appropriate   Language /Speech Content: Fluent  Language /Speech Volume: Normal  Language /Speech Rate/Productions: Hyperverbal  Recent Memory: Intact  Remote Memory: Intact  Mood: Depressed, Sad, Irritable  Orientation to Person: Yes   Orientation to Place: Yes  Orientation to Time of Day: Yes  Orientation to Date: Yes      Situation (Do they understand why they are here?): Yes  Psychomotor Behavior: Agitated  Thought Content: Suicidal  Thought Form: Paranoia     Medication  Psychotropic medications: Pt is on several medications for mental health, sleep, ADHD, anxiety, stress and other struggles/diagnosis.     Current Care Team  Patient Care Team:  [facility] as PCP - General (Clinic)  Chloe Alva APRN CNP as Nurse Practitioner (OB/Gyn)  Harley Esparza CNM as Assigned OBGYN Provider  Seble Jones PA-C as Physician Assistant  Dr. Jess Wilkins, PCP - [facility] # [phone]  Lynne Sawyer, therapist - [facility] # [phone]    Diagnosis  Patient Active Problem List   Diagnosis Code    MENTAL HEALTH     Suicidal ideation R45.851    Suicidal ideations R45.851    Intellectual disability F79    Bipolar affective disorder, currently depressed, moderate (H) F31.32    Borderline personality disorder (H) F60.3    Morbid obesity (H) E66.01       Primary Problem This Admission  There are no active hospital problems to display for this patient.      Clinical Summary and Substantiation of Recommendations   Pt has had several visits to the ED in the past year+. Pt mostly comes in for mental health and SI, but sometimes physical symptoms too. Today, pt came in because she has had diarrea and has been unable to keep food down, In addition pt has been struggling with SI, and self-injury behavior due to missing her Dad. Pt reports that her Dad passed away in 2021 due to complications with his diabetes. 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 Pt ijeoma lives in a Group Home, has lived there for 3 years, and has felt safe there. She reports that the group home keeps sharp objects and medications in a safe place to keep pt safe. Pt reports that she hasn't seen her therapist in a while, but agrees making an appt would be a good idea. Pt reports feeling suicidal daily, but also reports deterrents such as family  and people she cares about.      Disposition  Recommended disposition: Discharge back to group home, Individual Therapy, Medication Management        Reviewed case and recommendations with attending provider. Attending Name: Yes. Dr. Saul Milner       Attending concurs with disposition: yes       Patient and/or validated legal guardian concurs with disposition:   yes       Final disposition:  discharge    Legal status on admission: Voluntary/Patient has signed consent for treatment    Aftercare Plan    If I am feeling unsafe or I am in a crisis, I will:   Contact my established care providers:  Dr. Jess Wilkins MD @ [facility], # [phone]  Lynne Sawyer, therapist @ [facility], # [phone]  Call the National Suicide Prevention Lifeline: 988  Go to the nearest emergency room   Call 911     Warning signs that I or other people might notice when a crisis is developing for me: I am sad, quiet, not talking much, others will ask what is wrong.    Group Home helps to keep all sharp objects and medications not in reach of patient. This is helpful in keeping pt safe.     Things I am able to do on my own to cope or help me feel better: listening to music, cleaning, distractions for my mind, taking a shower.     Things that I am able to do with others to cope or help me better: talking with others     Things I can use or do for distraction: cleaning, organizing, listen to music     Changes I can make to support my mental health and wellness: Try my best not to think about suicidal thoughts; distract myself. I can also see my therapist again.     People in my life that I can ask for help: Roommates at Group Home, my sister, my mom, my friend Marcia.      Your [facility] has a mental health crisis team you can call 24/7: [facility] Mobile Crisis  [phone]     Additional resources and information:   Contact therapist to schedule an appt and re-start regular visits.   Therapist - Lynne Sawyer, # [phone]  [facility] -  "[facility]    Crisis Lines  Crisis Text Line  Text 996466  You will be connected with a trained live crisis counselor to provide support.    [employer]  Fast Tracker  Linking people to mental health and substance use disorder resources  [employer]     [place] Mental Health Warm Line  Peer to peer support  Monday thru Saturday, 12 pm to 10 pm  545.746.7648 or 0.963.321.3917  Text \"Support\" to 88346    National [place] on Mental Illness (MAGY)  214.108.1325 or 1.888.MAGY.HELPS    Mental Health Apps  My3  https://Arcadia Power.org/    VirtualHopeBox  https://Datanyze/apps/virtual-hope-box/      Additional Information  Today you were seen by a licensed mental health professional through Triage and Transition services, [facility] Providers ([facility])  for a crisis assessment in the Emergency Department at [facility].  It is recommended that you follow up with your established providers (mental health therapist, and primary care doctor - as relevant) as soon as possible. Coordinators from [facility] will be calling you in the next 24-48 hours to ensure that you have the resources you need.  You can also contact [facility] coordinators directly at [phone]. You may have been scheduled for or offered an appointment with a mental health provider. [facility] maintains an extensive network of licensed behavioral health providers to connect patients with the services they need.  We do not charge providers a fee to participate in our referral network.  We match patients with providers based on a patient's specific needs, insurance coverage, and location.  Our first effort will be to refer you to a provider within your care system, and will utilize providers outside your care system as needed.      Mindfulness exercises can be helpful. See below for helpful ideas.    What is Mindfulness?   Mindfulness is an ancient eastern practice which is very relevant for our lives " "today.Mindfulness is a very simple concept. Mindfulness means paying attention in a particular way: on purpose, in the present moment, and non-judgementally.   Mindfulness does not conflict with any beliefs ortraditions, whether Worship, cultural or scientific. It is simply a practical way to notice thoughts, physical sensations, sights, sounds, smells - anything we might not normally notice. The actual skillsmight be simple, but because it is so different to how our minds normally behave, it takes a lot of practice.   We might go out into the garden and as we look around, we might think \"That grassreally needs cutting, and that vegetable patch looks very untidy\". A young child on the other hand, will call over excitedly, \"Hey - come and look at this ant!\"   Mindfulness can simply be noticing whatwe don't normally notice, because our heads are too busy in the future or in the past - thinking about what we need to do, or going over what we have done.   Mindfulness might simply be described aschoosing and learning to control our focus of attention. Page 2 of 4 .MCH+elfhelvera.co.uk/mindfulness.htm www.Omni-ID.von Pacheco 2009, permission to use for therapy purposes.     Mindful Breathing   The primary focus in Mindfulness Meditation is the breathing. However, the primary goal is a calm, non-judging awareness, allowing thoughts and feelings to come and go withoutgetting caught up in them. This creates calmness and acceptance.   ? Sit comfortably, with your eyes closed and your spine reasonably straight.   ? Direct your attention to your breathing.   ? When thoughts, emotions, physical feelings or external sounds occur, accept them, giving them the space to come and go without judging or getting involved with them.   ? When you notice that your attention has drifted off and is becoming caught up in thoughts or feelings,simply note that the attention has drifted, and then gently bring the attention back to your " breathing.     It's ok and natural for thoughts to arise, and for your attention to follow them. No matterhow many times this happens, just keep bringing your attention back to your breathing.     Breathing Meditation 1 (Winston 1996)   Assume a comfortable posture lying on your back or sitting. If you are sitting, keep the spine straight and let your shoulders drop.   Close your eyes if itfeels comfortable.   Bring your attention to your belly, feeling it rise or expand gently on the in-breath and fall or recede on the out-breath.   Keep your focus on the breathing,  being with? each in-breath for its full duration and with each out-breath for its full duration, as if you were riding the waves of your own breathing.   Every timeyou notice that your mind has wandered off the breath, notice what it was that took you away and then gently bring your attention back to your belly and the feeling of the breath coming in and out.   If your mind wanders away from the breath a thousand times, then your job is simply to bring it back to the breath every time, no matter what it becomes preoccupied with.   Practice this exercisefor fifteen minutes at a convenient time every day, whether you feel like it or not, for one week and see how it feels to incorporate a disciplined meditation practice into your life. Be aware of how itfeels to spend some time each day just being with your breath without having to do anything.Page 4 of 4 www.inploid.comelfhelp.co.uk/mindfulness.htm www.nelson.von Pacheco 2009, permission to use fortherapy purposes.     Breathing Meditation 2 (Winston 1996)   ? Tune into your breathing at different times during the day, feeling the belly go through one or two risings and fallings.   ? Become aware of your thoughts andfeelings at these moments, just observing them without judging them or yourself.   ? At the same time, be aware of any changes in the way you are seeing things and feeling about yourself.  "    Using mindfulness to cope with negative experiences (thoughts, feelings, events)   As we become morepractised at using mindfulness for breathing, body sensations and routine daily activities, so we can then learn to be mindful of our thoughts and feelings, to become observers, and then more accepting ofthem. This results in less distressing feelings, and increases our ability to enjoy our lives.   With mindfulness, even the most disturbing sensations, feelings, thoughts, and experiences, can be viewedfrom a wider perspective as passing events in the mind, rather than as \"us\", or as being necessarily true. (Josiah 2003)   When we are more practiced in using mindfulness, we can use it even in of intense distress, by becoming mindful of the actual experience as an observer, using mindful breathing and focussing our attention on the breathing, listening to the distressing thoughts mindfully,recognising them as merely thoughts, breathing with them, allowing them to happen without believing them or arguing with them. If thoughts are too strong or loud, then we can move our attention to ourbreath, the body, or to sounds around us.   Maxim Montero uses the example of waves to help explain mindfulness.   Think of your mind as the surface of a lake or an ocean. There are always waveson the water, sometimes big, sometimes small, sometimes almost imperceptible. The water's waves are churned up by winds, which come and go and vary in direction and intensity, just as do the winds of stressand change in our lives, which stir up waves in our mind. It's possible to find shelter from much of the wind that agitates the mind. Whatever we might do to prevent them, the winds of life and of the mind blow.   \"You can't stop the waves, but you can learn to surf\" (Winston 2004)    Assessment Details   Total duration spent on the patient case in minutes: 31 min     CPT code(s) utilized: 13954 - Psychotherapy for Crisis - 60 (30-74*) " usama Freire, Psychotherapist  DEC - Triage & Transition Services  Callback: [phone]

## 2023-09-25 NOTE — PROGRESS NOTES
"DEC Assessment Follow-up    [facility] Care Coordinator was contacted by the provider, asking for a re-assessment on patient due to a Code 21 after  had seen patient.      consulted with supervisor and saw pt again to re-assess and determine if pt is still ok to discharge back to her group home.  met with pt and said she was just following up to see how pt was doing. Pt was out of bed, walking around, and stated that she is \"ready to go back home.\"  used active listening skills to let pt know she understood she wanted to return home.  stated she heard a Code 21 called for pt and asked what happened. Pt stated that she was biting herself, but didn't need to have anyone called.  asked pt what coping skills she was able to use to stop from biting. Pt stated that she talked with the people that came to her room.      also followed up with pt in regards to making a follow up appt with her therapist and had her sign an DANDY.  left a VM message with the therapist indicating that we would be sending over an DANDY, and that pt agreed to meet with therapist again more regularly to help her with her SI and MH symptoms.   Therapist, Lynne Sawyer, # [phone]     also followed up with provider, and provider agreed that pt was able to discharge home. Provider thanked  for meeting with pt again to follow up.    Fernanda Friere, ZEESHAN, LGSW  BEC, DEC , Psychotherapist Trainee  [facility]  [email or web address]    "

## 2023-09-26 ENCOUNTER — HOSPITAL ENCOUNTER (EMERGENCY)
Facility: CLINIC | Age: 23
Discharge: HOME | End: 2023-09-26
Attending: PSYCHIATRY & NEUROLOGY | Admitting: PSYCHIATRY & NEUROLOGY
Payer: MEDICARE

## 2023-09-26 VITALS
TEMPERATURE: 98.6 F | HEART RATE: 86 BPM | RESPIRATION RATE: 16 BRPM | DIASTOLIC BLOOD PRESSURE: 80 MMHG | SYSTOLIC BLOOD PRESSURE: 114 MMHG | OXYGEN SATURATION: 99 %

## 2023-09-26 DIAGNOSIS — F43.0 ACUTE REACTION TO SITUATIONAL STRESS: ICD-10-CM

## 2023-09-26 DIAGNOSIS — Z76.5 MALINGERING: ICD-10-CM

## 2023-09-26 DIAGNOSIS — F79 INTELLECTUAL DISABILITY: ICD-10-CM

## 2023-09-26 PROCEDURE — 99285 EMERGENCY DEPT VISIT HI MDM: CPT | Performed by: PSYCHIATRY & NEUROLOGY

## 2023-09-26 PROCEDURE — 250N000013 HC RX MED GY IP 250 OP 250 PS 637: Performed by: PSYCHIATRY & NEUROLOGY

## 2023-09-26 PROCEDURE — 99283 EMERGENCY DEPT VISIT LOW MDM: CPT | Performed by: PSYCHIATRY & NEUROLOGY

## 2023-09-26 RX ORDER — OLANZAPINE 10 MG/1
10 TABLET, ORALLY DISINTEGRATING ORAL ONCE
Status: COMPLETED | OUTPATIENT
Start: 2023-09-26 | End: 2023-09-26

## 2023-09-26 RX ADMIN — OLANZAPINE 10 MG: 10 TABLET, ORALLY DISINTEGRATING ORAL at 18:42

## 2023-09-26 ASSESSMENT — ACTIVITIES OF DAILY LIVING (ADL): ADLS_ACUITY_SCORE: 37

## 2023-09-26 NOTE — ED TRIAGE NOTES
Triage Assessment       Row Name 09/26/23 [id number]       Triage Assessment (Adult)    Airway WDL WDL       Respiratory WDL    Respiratory WDL WDL       Skin Circulation/Temperature WDL    Skin Circulation/Temperature WDL WDL       Cardiac WDL    Cardiac WDL WDL       Peripheral/Neurovascular WDL    Peripheral Neurovascular WDL WDL       Cognitive/Neuro/Behavioral WDL    Cognitive/Neuro/Behavioral WDL WDL

## 2023-09-27 NOTE — DISCHARGE INSTRUCTIONS
Follow-up with established care and services, ESPECIALLY supportive services set up through [facility].

## 2023-09-27 NOTE — ED PROVIDER NOTES
ED Provider Note  [facility]      History     Chief Complaint   Patient presents with    Suicidal     With plan to head bang     HPI  Sadaf Ross is a 22 year old female who is here via cab from her group home as she feels suicidal. Patient is well-known to us due to her excessive use of the ED to meet her needs. She has had well over 100 ED visits so far this year. She was just seen yesterday and had a clinic visit this morning. She engages in self-injury and ends up getting a sitter with whom she gets secondary gain of constant attention and support. It has been detrimental in her ability ot manage her stress in the community.    Patient does not exhibit psychosis. She appears rather comfortable with her surroundings and speaking to the sitter.    Past Medical History  Past Medical History:   Diagnosis Date    ADHD (attention deficit hyperactivity disorder)     Bipolar 1 disorder (H)     Borderline personality disorder (H)     Depression     Depressive disorder     Intellectual disability     Obesity     Syncope      Past Surgical History:   Procedure Laterality Date    APPENDECTOMY      APPENDECTOMY       acetaminophen (TYLENOL) 325 MG tablet  albuterol (PROAIR HFA/PROVENTIL HFA/VENTOLIN HFA) 108 (90 Base) MCG/ACT inhaler  ARIPiprazole lauroxil ER (ARISTADA) 882 MG/3.2ML intra-muscular  bisacodyl (DULCOLAX) 5 MG EC tablet  calcium carbonate (TUMS) 500 MG chewable tablet  cyclobenzaprine (FLEXERIL) 10 MG tablet  dicyclomine (BENTYL) 20 MG tablet  docusate sodium (COLACE) 50 MG capsule  famotidine (PEPCID) 20 MG tablet  FLUoxetine (PROZAC) 40 MG capsule  hydrocortisone, Perianal, (HYDROCORTISONE) 2.5 % cream  hydrOXYzine (ATARAX) 50 MG tablet  LORazepam (ATIVAN) 0.5 MG tablet  mupirocin (BACTROBAN) 2 % external ointment  OLANZapine zydis (ZYPREXA) 5 MG ODT  ondansetron (ZOFRAN) 4 MG tablet  ondansetron (ZOFRAN) 4 MG tablet  pantoprazole (PROTONIX) 40 MG EC tablet  polyethylene  glycol (MIRALAX) 17 GM/Dose powder  promethazine (PHENERGAN) 12.5 MG tablet  sennosides (SENOKOT) 8.6 MG tablet  traZODone (DESYREL) 50 MG tablet  Vitamin D, Cholecalciferol, 25 MCG (1000 UT) TABS      Allergies   Allergen Reactions    Penicillins Rash and Unknown     Family History  Family History   Problem Relation Age of Onset    Diabetes Type 1 Father     Cancer Paternal Grandfather      Social History   Social History     Tobacco Use    Smoking status: Every Day     Packs/day: 3.00     Years: 5.00     Pack years: 15.00     Types: Cigarettes    Smokeless tobacco: Never   Substance Use Topics    Alcohol use: No    Drug use: No         A medically appropriate review of systems was performed with pertinent positives and negatives noted in the HPI, and all other systems negative.    Physical Exam   BP: 126/83  Pulse: 78  Temp: 99  F (37.2  C)  Resp: 16  SpO2: 98 %  Physical Exam  Vitals and nursing note reviewed.   HENT:      Head: Normocephalic.   Eyes:      Pupils: Pupils are equal, round, and reactive to light.   Pulmonary:      Effort: Pulmonary effort is normal.   Musculoskeletal:         General: Normal range of motion.      Cervical back: Normal range of motion.   Neurological:      General: No focal deficit present.      Mental Status: She is alert.   Psychiatric:         Attention and Perception: Attention and perception normal. She does not perceive auditory or visual hallucinations.         Mood and Affect: Mood and affect normal.         Speech: Speech normal.         Behavior: Behavior normal. Behavior is not agitated, aggressive, hyperactive or combative. Behavior is cooperative.         Thought Content: Thought content normal. Thought content is not paranoid or delusional. Thought content does not include homicidal or suicidal ideation.         Cognition and Memory: Cognition and memory normal.         Judgment: Judgment is impulsive.           ED Course, Procedures, & Data      Procedures        Mental Health Risk Assessment        PSS-3      Date and Time Over the past 2 weeks have you felt down, depressed, or hopeless? Over the past 2 weeks have you had thoughts of killing yourself? Have you ever attempted to kill yourself? When did this last happen? User   09/26/23 5957 yes yes yes within the last month (but not today) MRB              Suicide assessment completed by mental health (D.E.C., LCSW, etc.)       No results found for any visits on 09/26/23.  Medications   OLANZapine zydis (zyPREXA) ODT tab 10 mg (10 mg Oral $Given 9/26/23 1433)     Labs Ordered and Resulted from Time of ED Arrival to Time of ED Departure - No data to display  No orders to display          Critical care was not performed.     Medical Decision Making  The patient's presentation was of high complexity (a chronic illness severe exacerbation, progression, or side effect of treatment).    The patient's evaluation involved:  review of external note(s) from 2 sources (see separate area of note for details)    The patient's management necessitated moderate risk (prescription drug management including medications given in the ED), moderate risk (limitations due to social determinants of health (see separate area of note for details)), and high risk (drug therapy requiring intensive monitoring (olanzapine 10 mg was given)).    Assessment & Plan    Patient is here via cab from her group home as she reports feeling upset and needing to be here. She reports feeling suicidal. She started to engage in head banging and a 1:1 sitter was assigned. She subsequently took an oral dose of Zyprexa. She appeared less dysregulated after an hour passed, yet refused to engage with myself. She appears to be evasive. She was observed to be actively engaged with the sitter and having an enjoyable time.    Patient appears at baseline and can be discharged. She was encouraged to arrange for her own transportation. She was encouraged to work with [facility] for intensive support in the community including a follow-up in 2 days from her visit today.    I have reviewed the nursing notes. I have reviewed the findings, diagnosis, plan and need for follow up with the patient.    New Prescriptions    No medications on file       Final diagnoses:   Acute reaction to situational stress   Intellectual disability   Malingering       Magno Sena MD  [facility] EMERGENCY DEPARTMENT  9/26/2023     Magno Sena MD  09/26/23 2004

## 2023-10-07 ENCOUNTER — HOSPITAL ENCOUNTER (EMERGENCY)
Facility: CLINIC | Age: 23
Discharge: HOME | End: 2023-10-07
Attending: FAMILY MEDICINE | Admitting: FAMILY MEDICINE
Payer: MEDICARE

## 2023-10-07 VITALS
HEART RATE: 98 BPM | OXYGEN SATURATION: 98 % | TEMPERATURE: 98.1 F | SYSTOLIC BLOOD PRESSURE: 117 MMHG | DIASTOLIC BLOOD PRESSURE: 74 MMHG | RESPIRATION RATE: 16 BRPM

## 2023-10-07 DIAGNOSIS — F79 INTELLECTUAL DISABILITY: Chronic | ICD-10-CM

## 2023-10-07 DIAGNOSIS — F60.3 BORDERLINE PERSONALITY DISORDER (H): Chronic | ICD-10-CM

## 2023-10-07 DIAGNOSIS — R46.89 AGGRESSIVE BEHAVIOR: ICD-10-CM

## 2023-10-07 PROCEDURE — 250N000013 HC RX MED GY IP 250 OP 250 PS 637: Performed by: FAMILY MEDICINE

## 2023-10-07 PROCEDURE — 99285 EMERGENCY DEPT VISIT HI MDM: CPT

## 2023-10-07 PROCEDURE — 99285 EMERGENCY DEPT VISIT HI MDM: CPT | Performed by: FAMILY MEDICINE

## 2023-10-07 PROCEDURE — 99284 EMERGENCY DEPT VISIT MOD MDM: CPT | Performed by: FAMILY MEDICINE

## 2023-10-07 RX ORDER — OLANZAPINE 10 MG/1
10 TABLET, ORALLY DISINTEGRATING ORAL ONCE
Status: COMPLETED | OUTPATIENT
Start: 2023-10-07 | End: 2023-10-07

## 2023-10-07 RX ADMIN — OLANZAPINE 10 MG: 10 TABLET, ORALLY DISINTEGRATING ORAL at 17:28

## 2023-10-07 ASSESSMENT — ACTIVITIES OF DAILY LIVING (ADL)
ADLS_ACUITY_SCORE: 37
ADLS_ACUITY_SCORE: 37

## 2023-10-07 NOTE — ED PROVIDER NOTES
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      HPI  Sadaf Ross is a 23 year old female with a past medical history of frequent ED presentations, ADHD, bipolar disorder, BPD, depression who presents to the ER for evaluation of suicidal ideation. She reports that she became frustrated with one of her group [employer] staff and so she hit her head on a wall.  Patient states that she feels more under control here in the emergency room is willing to take Zyprexa.    Past Medical History  Past Medical History:   Diagnosis Date    ADHD (attention deficit hyperactivity disorder)     Bipolar 1 disorder (H)     Borderline personality disorder (H)     Depression     Depressive disorder     Intellectual disability     Obesity     Syncope      Past Surgical History:   Procedure Laterality Date    APPENDECTOMY      APPENDECTOMY       acetaminophen (TYLENOL) 325 MG tablet  albuterol (PROAIR HFA/PROVENTIL HFA/VENTOLIN HFA) 108 (90 Base) MCG/ACT inhaler  ARIPiprazole lauroxil ER (ARISTADA) 882 MG/3.2ML intra-muscular  bisacodyl (DULCOLAX) 5 MG EC tablet  calcium carbonate (TUMS) 500 MG chewable tablet  cyclobenzaprine (FLEXERIL) 10 MG tablet  dicyclomine (BENTYL) 20 MG tablet  docusate sodium (COLACE) 50 MG capsule  famotidine (PEPCID) 20 MG tablet  FLUoxetine (PROZAC) 40 MG capsule  hydrocortisone, Perianal, (HYDROCORTISONE) 2.5 % cream  hydrOXYzine (ATARAX) 50 MG tablet  LORazepam (ATIVAN) 0.5 MG tablet  mupirocin (BACTROBAN) 2 % external ointment  OLANZapine zydis (ZYPREXA) 5 MG ODT  ondansetron (ZOFRAN) 4 MG tablet  ondansetron (ZOFRAN) 4 MG tablet  pantoprazole (PROTONIX) 40 MG EC tablet  polyethylene glycol (MIRALAX) 17 GM/Dose powder  promethazine (PHENERGAN) 12.5 MG tablet  sennosides  (SENOKOT) 8.6 MG tablet  traZODone (DESYREL) 50 MG tablet  Vitamin D, Cholecalciferol, 25 MCG (1000 UT) TABS      Allergies   Allergen Reactions    Penicillins Rash and Unknown     Family History  Family History   Problem Relation Age of Onset    Diabetes Type 1 Father     Cancer Paternal Grandfather      Social History   Social History     Tobacco Use    Smoking status: Every Day     Packs/day: 3.00     Years: 5.00     Pack years: 15.00     Types: Cigarettes    Smokeless tobacco: Never   Substance Use Topics    Alcohol use: No    Drug use: No         A medically appropriate review of systems was performed with pertinent positives and negatives noted in the HPI, and all other systems negative.    Physical Exam   BP: 117/74  Pulse: 98  Temp: 98.1  F (36.7  C)  Resp: 16  SpO2: 98 %  Physical Exam  Constitutional:       General: She is not in acute distress.     Appearance: Normal appearance. She is not diaphoretic.   HENT:      Head: Atraumatic.      Mouth/Throat:      Mouth: Mucous membranes are moist.   Eyes:      General: No scleral icterus.     Conjunctiva/sclera: Conjunctivae normal.   Cardiovascular:      Rate and Rhythm: Normal rate.      Heart sounds: Normal heart sounds.   Pulmonary:      Effort: No respiratory distress.      Breath sounds: Normal breath sounds.   Abdominal:      General: Abdomen is flat.   Musculoskeletal:      Cervical back: Neck supple.   Skin:     General: Skin is warm.      Findings: No rash.   Neurological:      General: No focal deficit present.      Mental Status: She is alert.      Sensory: No sensory deficit.      Motor: No weakness.      Coordination: Coordination normal.   Psychiatric:         Mood and Affect: Mood is anxious.         Speech: Speech normal.         Behavior: Behavior is cooperative.         Thought Content: Thought content does not include homicidal or suicidal ideation.           ED Course, Procedures, & Data      Procedures         Medications   OLANZapine zydis  (zyPREXA) ODT tab 10 mg (10 mg Oral $Given 10/7/23 4008)     Labs Ordered and Resulted from Time of ED Arrival to Time of ED Departure - No data to display  No orders to display          Critical care was not performed.     Medical Decision Making  The patient's presentation was of moderate complexity (a chronic illness mild to moderate exacerbation, progression, or side effect of treatment).    The patient's evaluation involved:  history and exam without other MDM data elements    The patient's management necessitated moderate risk (prescription drug management including medications given in the ED).    Assessment & Plan        I have reviewed the nursing notes. I have reviewed the findings, diagnosis, plan and need for follow up with the patient.    Patient arrived in the emergency room after increased agitation at group home she has calm and is at baseline received Zyprexa here in the emergency room will be discharged back to her group home.    Final diagnoses:   Intellectual disability   Borderline personality disorder (H)   Aggressive behavior   I, Sergio Sargent, am serving as a trained medical scribe to document services personally performed by Robert Olea MD, based on the provider's statements to me.     IRobert MD, was physically present and have reviewed and verified the accuracy of this note documented by Sergio Sargent.      Robert Olea MD  [facility] EMERGENCY DEPARTMENT  10/7/2023     Robert Olea MD  10/09/23 6498

## 2023-10-07 NOTE — ED TRIAGE NOTES
"Patient clams \" I am going to cut myself to end my life\"     Triage Assessment       Row Name 10/07/23 7254       Triage Assessment (Adult)    Airway WDL WDL       Respiratory WDL    Respiratory WDL WDL       Skin Circulation/Temperature WDL    Skin Circulation/Temperature WDL WDL       Cardiac WDL    Cardiac WDL WDL       Peripheral/Neurovascular WDL    Peripheral Neurovascular WDL WDL       Cognitive/Neuro/Behavioral WDL    Cognitive/Neuro/Behavioral WDL WDL                    "

## 2023-10-08 ENCOUNTER — HOSPITAL ENCOUNTER (EMERGENCY)
Facility: CLINIC | Age: 23
Discharge: HOME | End: 2023-10-08
Attending: EMERGENCY MEDICINE | Admitting: EMERGENCY MEDICINE
Payer: MEDICARE

## 2023-10-08 VITALS
RESPIRATION RATE: 18 BRPM | DIASTOLIC BLOOD PRESSURE: 77 MMHG | SYSTOLIC BLOOD PRESSURE: 117 MMHG | TEMPERATURE: 99.2 F | OXYGEN SATURATION: 98 % | HEART RATE: 81 BPM

## 2023-10-08 DIAGNOSIS — F31.32 BIPOLAR AFFECTIVE DISORDER, CURRENTLY DEPRESSED, MODERATE (H): ICD-10-CM

## 2023-10-08 DIAGNOSIS — F60.3 BORDERLINE PERSONALITY DISORDER (H): Chronic | ICD-10-CM

## 2023-10-08 PROCEDURE — 99283 EMERGENCY DEPT VISIT LOW MDM: CPT | Mod: FS | Performed by: EMERGENCY MEDICINE

## 2023-10-08 PROCEDURE — 99285 EMERGENCY DEPT VISIT HI MDM: CPT | Performed by: EMERGENCY MEDICINE

## 2023-10-08 ASSESSMENT — ACTIVITIES OF DAILY LIVING (ADL): ADLS_ACUITY_SCORE: 37

## 2023-10-08 NOTE — ED TRIAGE NOTES
BIBA  from . Pt states she feels suicidal because she caused some damage to someone else's property three days ago.     Triage Assessment       Row Name 10/08/23 2510       Triage Assessment (Adult)    Airway WDL WDL       Respiratory WDL    Respiratory WDL WDL       Skin Circulation/Temperature WDL    Skin Circulation/Temperature WDL WDL       Cardiac WDL    Cardiac WDL WDL       Peripheral/Neurovascular WDL    Peripheral Neurovascular WDL WDL       Cognitive/Neuro/Behavioral WDL    Cognitive/Neuro/Behavioral WDL WDL

## 2023-10-08 NOTE — ED NOTES
Bed: [facility]  Expected date: 10/8/23  Expected time: 4:15 PM  Means of arrival: Ambulance  Comments:  [place] [address] 22yo female, suicidal

## 2023-10-08 NOTE — DISCHARGE INSTRUCTIONS
Discharge in the emergency room back to [facility] home continue with current outpatient services.

## 2023-10-08 NOTE — ED PROVIDER NOTES
ED Provider Note  [facility]      History     Chief Complaint   Patient presents with    Suicidal     HPI  22yo F, well-known to this ED, pmhx bipolar disorder, borderline personality disorder, and sexual disability, and chronic suicidality presents with now resolved SI.  Patient states that 3 days ago she damaged a wall at her group home.  Reports today she was thinking out that, and it made her feel guilty.  This guilt resulted in her feeling poorly, and she says that this made her feel suicidal without specific plan.  However currently, while in the ED, she says that those suicidal feelings have resolved, and says that she is no longer suicidal.  Denies complaints to me at present, either somatic or psychologically.    Past Medical History  Past Medical History:   Diagnosis Date    ADHD (attention deficit hyperactivity disorder)     Bipolar 1 disorder (H)     Borderline personality disorder (H)     Depression     Depressive disorder     Intellectual disability     Obesity     Syncope      Past Surgical History:   Procedure Laterality Date    APPENDECTOMY      APPENDECTOMY       acetaminophen (TYLENOL) 325 MG tablet  albuterol (PROAIR HFA/PROVENTIL HFA/VENTOLIN HFA) 108 (90 Base) MCG/ACT inhaler  ARIPiprazole lauroxil ER (ARISTADA) 882 MG/3.2ML intra-muscular  bisacodyl (DULCOLAX) 5 MG EC tablet  calcium carbonate (TUMS) 500 MG chewable tablet  cyclobenzaprine (FLEXERIL) 10 MG tablet  dicyclomine (BENTYL) 20 MG tablet  docusate sodium (COLACE) 50 MG capsule  famotidine (PEPCID) 20 MG tablet  FLUoxetine (PROZAC) 40 MG capsule  hydrocortisone, Perianal, (HYDROCORTISONE) 2.5 % cream  hydrOXYzine (ATARAX) 50 MG tablet  LORazepam (ATIVAN) 0.5 MG tablet  mupirocin (BACTROBAN) 2 % external ointment  OLANZapine zydis (ZYPREXA) 5 MG ODT  ondansetron (ZOFRAN) 4 MG tablet  ondansetron (ZOFRAN) 4 MG tablet  pantoprazole (PROTONIX) 40 MG EC tablet  polyethylene glycol (MIRALAX) 17 GM/Dose  powder  promethazine (PHENERGAN) 12.5 MG tablet  sennosides (SENOKOT) 8.6 MG tablet  traZODone (DESYREL) 50 MG tablet  Vitamin D, Cholecalciferol, 25 MCG (1000 UT) TABS      Allergies   Allergen Reactions    Penicillins Rash and Unknown     Family History  Family History   Problem Relation Age of Onset    Diabetes Type 1 Father     Cancer Paternal Grandfather      Social History   Social History     Tobacco Use    Smoking status: Every Day     Packs/day: 3.00     Years: 5.00     Pack years: 15.00     Types: Cigarettes    Smokeless tobacco: Never   Substance Use Topics    Alcohol use: No    Drug use: No         A medically appropriate review of systems was performed with pertinent positives and negatives noted in the HPI, and all other systems negative.    Physical Exam   BP: 117/77  Pulse: 81  Temp: 99.2  F (37.3  C)  Resp: 18  SpO2: 98 %  Physical Exam  Constitutional:       General: She is not in acute distress.     Appearance: Normal appearance. She is well-developed.   HENT:      Head: Normocephalic and atraumatic.   Eyes:      Conjunctiva/sclera: Conjunctivae normal.   Cardiovascular:      Rate and Rhythm: Normal rate.   Pulmonary:      Effort: Pulmonary effort is normal.   Musculoskeletal:      Cervical back: Normal range of motion and neck supple.   Skin:     General: Skin is warm and dry.      Findings: No rash.   Neurological:      Mental Status: She is alert and oriented to person, place, and time.   Psychiatric:         Mood and Affect: Mood normal.         Speech: Speech normal.         Behavior: Behavior normal. Behavior is cooperative.         Thought Content: Thought content does not include homicidal or suicidal ideation. Thought content does not include homicidal or suicidal plan.           ED Course, Procedures, & Data      Procedures          Mental Health Risk Assessment        PSS-3      Date and Time Over the past 2 weeks have you felt down, depressed, or hopeless? Over the past 2 weeks have  you had thoughts of killing yourself? Have you ever attempted to kill yourself? When did this last happen? User   10/08/23 1626 yes yes no -- JBB          C-SSRS ([place])      Date and Time Q1 Wished to be Dead (Past Month) Q2 Suicidal Thoughts (Past Month) Q3 Suicidal Thought Method Q4 Suicidal Intent without Specific Plan Q5 Suicide Intent with Specific Plan Q6 Suicide Behavior (Lifetime) Within the Past 3 Months? RETIRED: Level of Risk per Screen Screening Not Complete User   10/08/23 1626 yes yes yes yes yes yes -- -- -- JBB                Item Assessment   Suicidal Ideation None   Plan None   Intent N/a   Suicidal or self-harm behaviors    Risk Factors    Protective Factors           No results found for any visits on 10/08/23.  Medications - No data to display  Labs Ordered and Resulted from Time of ED Arrival to Time of ED Departure - No data to display  No orders to display          Critical care was not performed.     Medical Decision Making  The patient's presentation was of high complexity (a chronic illness severe exacerbation, progression, or side effect of treatment).    The patient's evaluation involved:  history and exam without other MDM data elements    The patient's management necessitated only low risk treatment.    Assessment & Plan    24yo F, well-known to this ED, pmhx bipolar disorder, borderline personality disorder, and sexual disability, and chronic suicidality presents with now resolved SI.  Patient has clear plan that outlines her secondary gain from coming to the ED from her group home.  Given patient denies SI or HI to me at present (and complaints overall) do not feel that further work-up is indicated at this time.  Patient will be discharged back to her group home.  Patient encouraged to follow-up with her outpatient mental health team.    --    ED Attending Physician Attestation    RAHEL Lopez MD, cared for this patient with the Advanced Practice Provider (BETHANY). I have  performed a history and physical examination of the patient independent of the BETHANY. I reviewed the BETHANY's documentation above and agree with the documented findings and plan of care. I personally provided a substantive portion of the care for this patient, including the complete Medical Decision Making. Please see the BETHANY's documentation for full details.    Summary of HPI, PE, ED Course   Patient is a 23 year old female evaluated in the emergency department for suicidal ideation.  She reports she was feeling guilty for damaging someone else's property and had suicidal thoughts with a plan to do so by biting herself.  She denies any ongoing suicidal ideation and feels that she is at baseline.  She feels that talking with someone in the emergency department has helped.  I did review her care plan and at this time feel she is safe to return to her group home which does have 24/7 staffing available.  She does not have any evidence of traumatic injury to her head.  She denies any headache, nausea, vomiting, vision changes or other red flag findings to warrant CT imaging at this point.  Plan to discharge back to her group home for ongoing management.        Jyoti Lopez MD  Emergency Medicine       I have reviewed the nursing notes. I have reviewed the findings, diagnosis, plan and need for follow up with the patient.    New Prescriptions    No medications on file       Final diagnoses:   Borderline personality disorder (H)   Bipolar affective disorder, currently depressed, moderate (H)         Dexter Saunders PA-C  [facility] EMERGENCY DEPARTMENT  10/8/2023     Dexter Saunders PA-C  10/08/23 1724       Jyoti Lopez MD  10/08/23 3363

## 2023-10-09 ENCOUNTER — HOSPITAL ENCOUNTER (EMERGENCY)
Facility: CLINIC | Age: 23
Discharge: HOME | End: 2023-10-09
Attending: FAMILY MEDICINE | Admitting: FAMILY MEDICINE
Payer: MEDICARE

## 2023-10-09 VITALS
SYSTOLIC BLOOD PRESSURE: 155 MMHG | DIASTOLIC BLOOD PRESSURE: 90 MMHG | HEART RATE: 75 BPM | TEMPERATURE: 98.3 F | OXYGEN SATURATION: 99 % | RESPIRATION RATE: 16 BRPM

## 2023-10-09 DIAGNOSIS — F60.3 BORDERLINE PERSONALITY DISORDER (H): Chronic | ICD-10-CM

## 2023-10-09 DIAGNOSIS — Z72.89 SELF-INJURIOUS BEHAVIOR: ICD-10-CM

## 2023-10-09 DIAGNOSIS — F79 INTELLECTUAL DISABILITY: Chronic | ICD-10-CM

## 2023-10-09 PROCEDURE — 250N000013 HC RX MED GY IP 250 OP 250 PS 637: Performed by: FAMILY MEDICINE

## 2023-10-09 PROCEDURE — 250N000011 HC RX IP 250 OP 636: Mod: JZ

## 2023-10-09 PROCEDURE — 99284 EMERGENCY DEPT VISIT MOD MDM: CPT | Performed by: FAMILY MEDICINE

## 2023-10-09 PROCEDURE — 96372 THER/PROPH/DIAG INJ SC/IM: CPT

## 2023-10-09 RX ORDER — OLANZAPINE 10 MG/2ML
INJECTION, POWDER, FOR SOLUTION INTRAMUSCULAR
Status: COMPLETED
Start: 2023-10-09 | End: 2023-10-09

## 2023-10-09 RX ORDER — OLANZAPINE 10 MG/2ML
10 INJECTION, POWDER, FOR SOLUTION INTRAMUSCULAR ONCE
Status: DISCONTINUED | OUTPATIENT
Start: 2023-10-09 | End: 2023-10-09

## 2023-10-09 RX ORDER — OLANZAPINE 10 MG/1
10 TABLET, ORALLY DISINTEGRATING ORAL ONCE
Status: COMPLETED | OUTPATIENT
Start: 2023-10-09 | End: 2023-10-09

## 2023-10-09 RX ADMIN — OLANZAPINE 10 MG: 10 INJECTION, POWDER, LYOPHILIZED, FOR SOLUTION INTRAMUSCULAR at 17:09

## 2023-10-09 RX ADMIN — OLANZAPINE 10 MG: 10 TABLET, ORALLY DISINTEGRATING ORAL at 16:38

## 2023-10-09 ASSESSMENT — ACTIVITIES OF DAILY LIVING (ADL): ADLS_ACUITY_SCORE: 37

## 2023-10-09 NOTE — ED NOTES
Pt discharged by by med cab, per treatment plan and MD verbal orders. Pt ambulated off the unit with all personal belongings.

## 2023-10-11 ENCOUNTER — HOSPITAL ENCOUNTER (EMERGENCY)
Facility: CLINIC | Age: 23
Discharge: HOME | End: 2023-10-11
Attending: EMERGENCY MEDICINE | Admitting: EMERGENCY MEDICINE
Payer: MEDICARE

## 2023-10-11 DIAGNOSIS — R04.0 EPISTAXIS: ICD-10-CM

## 2023-10-11 PROCEDURE — 250N000013 HC RX MED GY IP 250 OP 250 PS 637: Performed by: EMERGENCY MEDICINE

## 2023-10-11 PROCEDURE — 99283 EMERGENCY DEPT VISIT LOW MDM: CPT | Mod: 25 | Performed by: EMERGENCY MEDICINE

## 2023-10-11 PROCEDURE — 30901 CONTROL OF NOSEBLEED: CPT | Performed by: EMERGENCY MEDICINE

## 2023-10-11 PROCEDURE — 99284 EMERGENCY DEPT VISIT MOD MDM: CPT | Performed by: EMERGENCY MEDICINE

## 2023-10-11 PROCEDURE — 99283 EMERGENCY DEPT VISIT LOW MDM: CPT | Performed by: EMERGENCY MEDICINE

## 2023-10-11 RX ADMIN — PHENYLEPHRINE HYDROCHLORIDE 1 SPRAY: 0.25 SPRAY NASAL at 18:53

## 2023-10-11 ASSESSMENT — ACTIVITIES OF DAILY LIVING (ADL): ADLS_ACUITY_SCORE: 37

## 2023-10-11 NOTE — ED PROVIDER NOTES
ED Provider Note  [facility]      History     Chief Complaint   Patient presents with    Suicidal     Suicidal thoughts, also having a headache.     HPI  Sadaf Ross is a 23 year old female who presents emergency room with ongoing thoughts of suicide she was agitated by an interaction with staff at her group home and states that she hit the wall and put a hole in the wall denies any hand pain no history of trauma patient has repeated emergency room visits for similar complaint patient is willing to take Zyprexa at this time.    Past Medical History  Past Medical History:   Diagnosis Date    ADHD (attention deficit hyperactivity disorder)     Bipolar 1 disorder (H)     Borderline personality disorder (H)     Depression     Depressive disorder     Intellectual disability     Obesity     Syncope      Past Surgical History:   Procedure Laterality Date    APPENDECTOMY      APPENDECTOMY       acetaminophen (TYLENOL) 325 MG tablet  albuterol (PROAIR HFA/PROVENTIL HFA/VENTOLIN HFA) 108 (90 Base) MCG/ACT inhaler  ARIPiprazole lauroxil ER (ARISTADA) 882 MG/3.2ML intra-muscular  bisacodyl (DULCOLAX) 5 MG EC tablet  calcium carbonate (TUMS) 500 MG chewable tablet  cyclobenzaprine (FLEXERIL) 10 MG tablet  dicyclomine (BENTYL) 20 MG tablet  docusate sodium (COLACE) 50 MG capsule  famotidine (PEPCID) 20 MG tablet  FLUoxetine (PROZAC) 40 MG capsule  hydrocortisone, Perianal, (HYDROCORTISONE) 2.5 % cream  hydrOXYzine (ATARAX) 50 MG tablet  LORazepam (ATIVAN) 0.5 MG tablet  mupirocin (BACTROBAN) 2 % external ointment  OLANZapine zydis (ZYPREXA) 5 MG ODT  ondansetron (ZOFRAN) 4 MG tablet  ondansetron (ZOFRAN) 4 MG tablet  pantoprazole (PROTONIX) 40 MG EC tablet  polyethylene glycol (MIRALAX) 17 GM/Dose powder  promethazine (PHENERGAN) 12.5 MG tablet  sennosides (SENOKOT) 8.6 MG tablet  traZODone (DESYREL) 50 MG tablet  Vitamin D, Cholecalciferol, 25 MCG (1000 UT) TABS      Allergies   Allergen  Reactions    Penicillins Rash and Unknown     Family History  Family History   Problem Relation Age of Onset    Diabetes Type 1 Father     Cancer Paternal Grandfather      Social History   Social History     Tobacco Use    Smoking status: Every Day     Packs/day: 3.00     Years: 5.00     Additional pack years: 0.00     Total pack years: 15.00     Types: Cigarettes    Smokeless tobacco: Never   Substance Use Topics    Alcohol use: No    Drug use: No         A medically appropriate review of systems was performed with pertinent positives and negatives noted in the HPI, and all other systems negative.    Physical Exam   BP: (!) 155/90  Pulse: 75  Temp: 98.3  F (36.8  C)  Resp: 16  SpO2: 99 %  Physical Exam  Constitutional:       General: She is not in acute distress.     Appearance: Normal appearance. She is not diaphoretic.   HENT:      Head: Atraumatic.      Mouth/Throat:      Mouth: Mucous membranes are moist.   Eyes:      General: No scleral icterus.     Conjunctiva/sclera: Conjunctivae normal.   Cardiovascular:      Rate and Rhythm: Normal rate.      Heart sounds: Normal heart sounds.   Pulmonary:      Effort: No respiratory distress.      Breath sounds: Normal breath sounds.   Abdominal:      General: Abdomen is flat.   Musculoskeletal:      Cervical back: Neck supple.   Skin:     General: Skin is warm.      Findings: No rash.   Neurological:      General: No focal deficit present.      Mental Status: She is alert and oriented to person, place, and time.      Sensory: No sensory deficit.      Motor: No weakness.      Coordination: Coordination normal.   Psychiatric:         Mood and Affect: Mood is anxious and depressed.         Speech: Speech normal.         Behavior: Behavior is cooperative.         Thought Content: Thought content includes suicidal ideation. Thought content does not include suicidal plan.           ED Course, Procedures, & Data      Procedures         No results found for any visits on  10/09/23.  Medications   OLANZapine zydis (zyPREXA) ODT tab 10 mg (10 mg Oral $Given 10/9/23 1638)   OLANZapine (zyPREXA) 10 MG injection (10 mg Intramuscular $Given 10/9/23 1707)     Labs Ordered and Resulted from Time of ED Arrival to Time of ED Departure - No data to display  No orders to display          Critical care was not performed.     Medical Decision Making  The patient's presentation was of moderate complexity (a chronic illness mild to moderate exacerbation, progression, or side effect of treatment).    The patient's evaluation involved:  an assessment requiring an independent historian (patient was discussed with group home staff at this time patient is essentially returned to baseline and will be returning to the group home.)    The patient's management necessitated moderate risk (prescription drug management including medications given in the ED).    Assessment & Plan        I have reviewed the nursing notes. I have reviewed the findings, diagnosis, plan and need for follow up with the patient.    Patient was given 2 doses of Zyprexa while here in the emergency room is essentially returning to baseline and is transported back to the group home.    Final diagnoses:   Intellectual disability   Borderline personality disorder (H)   Self-injurious behavior         [facility] EMERGENCY DEPARTMENT  10/9/2023     Robert Olea MD  10/11/23 5435

## 2023-10-11 NOTE — ED TRIAGE NOTES
BIB EMS for x2 nose bleeds today.      Triage Assessment (Adult)       Row Name 10/11/23 3830          Triage Assessment    Airway WDL WDL        Respiratory WDL    Respiratory WDL WDL        Skin Circulation/Temperature WDL    Skin Circulation/Temperature WDL WDL        Cardiac WDL    Cardiac WDL WDL        Peripheral/Neurovascular WDL    Peripheral Neurovascular WDL WDL        Cognitive/Neuro/Behavioral WDL    Cognitive/Neuro/Behavioral WDL WDL
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
## 2023-10-11 NOTE — DISCHARGE INSTRUCTIONS
Continue measures for epistaxis including humidified air, you can also apply Vaseline to the nares.    Please make an appointment to follow up with Your Primary Care Provider if concussion symptoms persist after 2 weeks.  Otherwise follow the concussion recommendations listed above.

## 2023-10-12 ENCOUNTER — HOSPITAL ENCOUNTER (EMERGENCY)
Facility: CLINIC | Age: 23
Discharge: HOME | End: 2023-10-12
Attending: EMERGENCY MEDICINE
Payer: MEDICARE

## 2023-10-12 DIAGNOSIS — F41.9 ANXIETY: ICD-10-CM

## 2023-10-12 DIAGNOSIS — F60.3 BORDERLINE PERSONALITY DISORDER (H): ICD-10-CM

## 2023-10-12 NOTE — ED NOTES
Writer spoke with Group home Staff Arlene and informed her the pt was being discharge. Staff was agreeable to plan

## 2023-10-12 NOTE — ED PROVIDER NOTES
[facility] EMERGENCY DEPARTMENT ([facility])    10/12/23      ED PROVIDER NOTE      History   No chief complaint on file.    HPI  Sadaf Ross is a 23 year old female with a past medical history significant for bipolar 1 disorder, BPD, intellectual disability, chronic suicidal ideation, major depressive disorder, and history of frequent hospital visits who presents to the ED for evaluation. Patient does have an ED care plan in place.  She says she is here today because about 4 days ago she hit her head against her wall and made a dent in the wall.  She doesn't know why she acted that way and wishes she didn't do it.  The group home where she lives doesn't know that she put the hole in the wall.  Her staff knows but not the group home itself.  She is worried that they will kick her out.  She says she has a bit of a headache but didn't have any swelling of her head or skin and no bleeding. She has some back pain as well she admits she worries a lot.  She has support from her sister, aunt, mother, and group home staff. She is her own legal guardian. She has a .     Past Medical History  Past Medical History:   Diagnosis Date    ADHD (attention deficit hyperactivity disorder)     Bipolar 1 disorder (H)     Borderline personality disorder (H)     Depression     Depressive disorder     Intellectual disability     Obesity     Syncope      Past Surgical History:   Procedure Laterality Date    APPENDECTOMY      APPENDECTOMY       acetaminophen (TYLENOL) 325 MG tablet  albuterol (PROAIR HFA/PROVENTIL HFA/VENTOLIN HFA) 108 (90 Base) MCG/ACT inhaler  ARIPiprazole lauroxil ER (ARISTADA) 882 MG/3.2ML intra-muscular  bisacodyl (DULCOLAX) 5 MG EC tablet  calcium carbonate (TUMS) 500 MG chewable tablet  cyclobenzaprine (FLEXERIL) 10 MG tablet  dicyclomine (BENTYL) 20 MG tablet  docusate sodium (COLACE) 50 MG capsule  famotidine (PEPCID) 20 MG tablet  FLUoxetine (PROZAC) 40 MG capsule  hydrocortisone,  Perianal, (HYDROCORTISONE) 2.5 % cream  hydrOXYzine (ATARAX) 50 MG tablet  LORazepam (ATIVAN) 0.5 MG tablet  mupirocin (BACTROBAN) 2 % external ointment  OLANZapine zydis (ZYPREXA) 5 MG ODT  ondansetron (ZOFRAN) 4 MG tablet  ondansetron (ZOFRAN) 4 MG tablet  pantoprazole (PROTONIX) 40 MG EC tablet  polyethylene glycol (MIRALAX) 17 GM/Dose powder  promethazine (PHENERGAN) 12.5 MG tablet  sennosides (SENOKOT) 8.6 MG tablet  traZODone (DESYREL) 50 MG tablet  Vitamin D, Cholecalciferol, 25 MCG (1000 UT) TABS      Allergies   Allergen Reactions    Penicillins Rash and Unknown     Family History  Family History   Problem Relation Age of Onset    Diabetes Type 1 Father     Cancer Paternal Grandfather      Social History   Social History     Tobacco Use    Smoking status: Every Day     Packs/day: 3.00     Years: 5.00     Additional pack years: 0.00     Total pack years: 15.00     Types: Cigarettes    Smokeless tobacco: Never   Substance Use Topics    Alcohol use: No    Drug use: No      Past medical history, past surgical history, medications, allergies, family history, and social history were reviewed with the patient. No additional pertinent items.          Physical Exam      Physical Exam  Vitals and nursing note reviewed.   Constitutional:       Appearance: She is obese.   HENT:      Head: Normocephalic and atraumatic.      Right Ear: External ear normal.      Left Ear: External ear normal.      Nose: Nose normal.   Eyes:      Extraocular Movements: Extraocular movements intact.      Pupils: Pupils are equal, round, and reactive to light.   Cardiovascular:      Rate and Rhythm: Normal rate and regular rhythm.      Heart sounds: Normal heart sounds.   Pulmonary:      Effort: Pulmonary effort is normal.      Breath sounds: Normal breath sounds.   Abdominal:      Palpations: Abdomen is soft.   Musculoskeletal:         General: No tenderness. Normal range of motion.      Cervical back: Normal range of motion and neck  supple. No rigidity or tenderness.      Comments: No tenderness of back on exam. Normal rom.    Skin:     General: Skin is warm and dry.      Findings: No bruising or lesion.   Neurological:      General: No focal deficit present.      Mental Status: She is alert and oriented to person, place, and time.   Psychiatric:         Attention and Perception: Attention and perception normal.         Mood and Affect: Mood is anxious (mild/baseline).         Speech: Speech normal.         Behavior: Behavior normal. Behavior is cooperative.         Thought Content: Thought content normal.         Cognition and Memory: Cognition and memory normal.         Judgment: Judgment is impulsive.           ED Course, Procedures, & Data      Procedures                     No results found for any visits on 10/12/23.  Medications - No data to display  Labs Ordered and Resulted from Time of ED Arrival to Time of ED Departure - No data to display  No orders to display          Critical care was not performed.     Medical Decision Making  The patient's presentation was of moderate complexity (a chronic illness mild to moderate exacerbation, progression, or side effect of treatment).    The patient's evaluation involved:  review of external note(s) from 3+ sources (October ed visits)    The patient's management necessitated only low risk treatment.    Assessment & Plan    Sadaf Ross is a 23 year old female with a past medical history significant for bipolar 1 disorder, BPD, intellectual disability, chronic suicidal ideation, major depressive disorder, and history of frequent hospital visits who presents to the ED for evaluation. She is feeling anxious about putting a hole in her wall back at her group home.  She is her own legal guardian and took a taxi here today.  She is worried about being kicked out.  She has a bit of a headache and back pain but no evidence of head injury and did not have any head swelling or bruising. Her exam  shows no evidence of trauma.  She is well known to the ED and comes frequently. She is was evaluated and examined in the ED waiting room and after my evaluation she would like to go home and not wait for a room.  She will continue working with her current providers.     I have reviewed the nursing notes. I have reviewed the findings, diagnosis, plan and need for follow up with the patient.    New Prescriptions    No medications on file       Final diagnoses:   None       Ashely Toth MD  [facility] EMERGENCY DEPARTMENT  10/12/2023     Ashely Toth MD  10/12/23 1939

## 2023-10-13 NOTE — ED NOTES
Patient was seen by MD in ED lobby and discharged prior to writer being able to complete triage.  MD escorted patient out of ED without discharge paperwork.

## 2023-10-17 ENCOUNTER — HOSPITAL ENCOUNTER (EMERGENCY)
Facility: CLINIC | Age: 23
Discharge: HOME | End: 2023-10-17
Attending: EMERGENCY MEDICINE | Admitting: EMERGENCY MEDICINE
Payer: MEDICARE

## 2023-10-17 VITALS
SYSTOLIC BLOOD PRESSURE: 124 MMHG | HEART RATE: 96 BPM | DIASTOLIC BLOOD PRESSURE: 88 MMHG | RESPIRATION RATE: 16 BRPM | TEMPERATURE: 98.3 F | OXYGEN SATURATION: 100 %

## 2023-10-17 DIAGNOSIS — R51.9 CHRONIC NONINTRACTABLE HEADACHE, UNSPECIFIED HEADACHE TYPE: ICD-10-CM

## 2023-10-17 DIAGNOSIS — G89.29 CHRONIC NONINTRACTABLE HEADACHE, UNSPECIFIED HEADACHE TYPE: ICD-10-CM

## 2023-10-17 DIAGNOSIS — F41.9 ANXIETY: ICD-10-CM

## 2023-10-17 PROCEDURE — 99284 EMERGENCY DEPT VISIT MOD MDM: CPT | Performed by: EMERGENCY MEDICINE

## 2023-10-17 PROCEDURE — 99283 EMERGENCY DEPT VISIT LOW MDM: CPT | Performed by: EMERGENCY MEDICINE

## 2023-10-17 PROCEDURE — 250N000013 HC RX MED GY IP 250 OP 250 PS 637: Performed by: EMERGENCY MEDICINE

## 2023-10-17 RX ORDER — ACETAMINOPHEN 500 MG
1000 TABLET ORAL ONCE
Status: COMPLETED | OUTPATIENT
Start: 2023-10-17 | End: 2023-10-17

## 2023-10-17 RX ORDER — HYDROXYZINE HYDROCHLORIDE 50 MG/1
50 TABLET, FILM COATED ORAL ONCE
Status: COMPLETED | OUTPATIENT
Start: 2023-10-17 | End: 2023-10-17

## 2023-10-17 RX ORDER — IBUPROFEN 800 MG/1
800 TABLET, FILM COATED ORAL ONCE
Status: COMPLETED | OUTPATIENT
Start: 2023-10-17 | End: 2023-10-17

## 2023-10-17 RX ADMIN — HYDROXYZINE HYDROCHLORIDE 50 MG: 50 TABLET, FILM COATED ORAL at 19:26

## 2023-10-17 RX ADMIN — IBUPROFEN 800 MG: 800 TABLET ORAL at 19:26

## 2023-10-17 RX ADMIN — ACETAMINOPHEN 1000 MG: 500 TABLET ORAL at 19:26

## 2023-10-17 ASSESSMENT — ACTIVITIES OF DAILY LIVING (ADL)
ADLS_ACUITY_SCORE: 37
ADLS_ACUITY_SCORE: 35

## 2023-10-17 NOTE — ED TRIAGE NOTES
Triage Assessment (Adult)       Row Name 10/17/23 6717          Triage Assessment    Airway WDL WDL        Respiratory WDL    Respiratory WDL WDL        Skin Circulation/Temperature WDL    Skin Circulation/Temperature WDL WDL        Cardiac WDL    Cardiac WDL WDL        Peripheral/Neurovascular WDL    Peripheral Neurovascular WDL WDL        Cognitive/Neuro/Behavioral WDL    Cognitive/Neuro/Behavioral WDL WDL

## 2023-10-18 ENCOUNTER — HOSPITAL ENCOUNTER (EMERGENCY)
Facility: CLINIC | Age: 23
Discharge: HOME | End: 2023-10-18
Attending: EMERGENCY MEDICINE | Admitting: EMERGENCY MEDICINE
Payer: MEDICARE

## 2023-10-18 VITALS
HEART RATE: 108 BPM | TEMPERATURE: 98.4 F | SYSTOLIC BLOOD PRESSURE: 143 MMHG | DIASTOLIC BLOOD PRESSURE: 100 MMHG | RESPIRATION RATE: 16 BRPM | OXYGEN SATURATION: 100 %

## 2023-10-18 DIAGNOSIS — R51.9 NONINTRACTABLE HEADACHE, UNSPECIFIED CHRONICITY PATTERN, UNSPECIFIED HEADACHE TYPE: ICD-10-CM

## 2023-10-18 DIAGNOSIS — T50.Z95A SIDE EFFECTS OF VACCINATION, INITIAL ENCOUNTER: ICD-10-CM

## 2023-10-18 PROCEDURE — 99283 EMERGENCY DEPT VISIT LOW MDM: CPT | Performed by: EMERGENCY MEDICINE

## 2023-10-18 PROCEDURE — 99282 EMERGENCY DEPT VISIT SF MDM: CPT | Performed by: EMERGENCY MEDICINE

## 2023-10-18 RX ORDER — ACETAMINOPHEN 325 MG/1
325 TABLET ORAL EVERY 4 HOURS PRN
Status: DISCONTINUED | OUTPATIENT
Start: 2023-10-18 | End: 2023-10-18 | Stop reason: HOSPADM

## 2023-10-18 NOTE — ED PROVIDER NOTES
"ED Provider Note  [facility]      History     Chief Complaint   Patient presents with    Headache     \"I am having a reaction to my booster,\" received covid booster today at 8 am, now having headache and fatigue.     HPI  Sadaf Ross is a 23 year old female who well-known to the emergency department who presents today with headache.  Patient reports that she received her COVID booster at 8 AM this morning and now she has headache and fatigue.  Of note she was seen here yesterday for headache as well.  When asked about this she states that that headache has since resolved and that this 1 returned sometime midday along with feeling very fatigued.  She denies fever, cough, sore throat.  No known COVID contacts.  No other neuro concerns or complaints.      Past Medical History  Past Medical History:   Diagnosis Date    ADHD (attention deficit hyperactivity disorder)     Bipolar 1 disorder (H)     Borderline personality disorder (H)     Depression     Depressive disorder     Intellectual disability     Obesity     Syncope      Past Surgical History:   Procedure Laterality Date    APPENDECTOMY      APPENDECTOMY       acetaminophen (TYLENOL) 325 MG tablet  albuterol (PROAIR HFA/PROVENTIL HFA/VENTOLIN HFA) 108 (90 Base) MCG/ACT inhaler  ARIPiprazole lauroxil ER (ARISTADA) 882 MG/3.2ML intra-muscular  bisacodyl (DULCOLAX) 5 MG EC tablet  calcium carbonate (TUMS) 500 MG chewable tablet  cyclobenzaprine (FLEXERIL) 10 MG tablet  dicyclomine (BENTYL) 20 MG tablet  docusate sodium (COLACE) 50 MG capsule  famotidine (PEPCID) 20 MG tablet  FLUoxetine (PROZAC) 40 MG capsule  hydrocortisone, Perianal, (HYDROCORTISONE) 2.5 % cream  hydrOXYzine (ATARAX) 50 MG tablet  LORazepam (ATIVAN) 0.5 MG tablet  mupirocin (BACTROBAN) 2 % external ointment  OLANZapine zydis (ZYPREXA) 5 MG ODT  ondansetron (ZOFRAN) 4 MG tablet  ondansetron (ZOFRAN) 4 MG tablet  pantoprazole (PROTONIX) 40 MG EC tablet  polyethylene " glycol (MIRALAX) 17 GM/Dose powder  promethazine (PHENERGAN) 12.5 MG tablet  sennosides (SENOKOT) 8.6 MG tablet  traZODone (DESYREL) 50 MG tablet  Vitamin D, Cholecalciferol, 25 MCG (1000 UT) TABS      Allergies   Allergen Reactions    Penicillins Rash and Unknown     Family History  Family History   Problem Relation Age of Onset    Diabetes Type 1 Father     Cancer Paternal Grandfather      Social History   Social History     Tobacco Use    Smoking status: Every Day     Packs/day: 3.00     Years: 5.00     Additional pack years: 0.00     Total pack years: 15.00     Types: Cigarettes    Smokeless tobacco: Never   Substance Use Topics    Alcohol use: No    Drug use: No         A medically appropriate review of systems was performed with pertinent positives and negatives noted in the HPI, and all other systems negative.    Physical Exam   BP: (!) 143/100  Pulse: 108  Temp: 98.4  F (36.9  C)  Resp: 16  SpO2: 100 %  Physical Exam  General: awake, alert, NAD  Head: normal cephalic  HEENT: pupils equal, conjugate gaze intact  Neck: Supple  CV: regular rate   Lungs: clear to auscultation  Abd: soft, non-tender, no guarding, no peritoneal signs  EXT: lower extremities without swelling or edema  Neuro: awake, answers questions appropriately. No focal deficits noted; normal gait.  5 out of 5 strength of the bilateral upper and lower extremities.  Cranial nerves II through XII are intact.  Normal tandem gait.  Sensation light touch of bilateral upper and lower extremities are intact.      ED Course, Procedures, & Data      Procedures               No results found for any visits on 10/18/23.  Medications   acetaminophen (TYLENOL) tablet 325 mg (has no administration in time range)     Labs Ordered and Resulted from Time of ED Arrival to Time of ED Departure - No data to display  No orders to display        Medical Decision Making  The patient's presentation was of moderate complexity moderate complexity a exacerbation of a  chronic condition    The patient's evaluation involved:  review of external note(s) from 3+ sources (see separate area of note for details)      The patient's management necessitated moderate risk (prescription drug management including medications given in the ED)       Assessment & Plan    Sadaf was asked to be evaluated for headache and feeling fatigued after getting her COVID-vaccine today.    When I saw her she was resting comfortably on the cot, normal vital signs, afebrile without acute infectious complaints outside of the headache and feeling generally unwell.  I reviewed her recent ER visit for headache she reports that that headache had resolved and this 1 restarted today.    Discussed Tylenol, ibuprofen, fluids and rest as I feel that this is likely a side effect of her COVID-vaccine.  Following this conversation patient requested discharge.  She reported while she was in the ER she ate [employer] and 2 cheese sticks and was now feeling better that her headache and fatigue were significantly improved and she did not want the medications that I ordered her.      She will be discharged to her group home per her request.  She can return for any new or worsening symptoms.     I have reviewed the nursing notes. I have reviewed the findings, diagnosis, plan and need for follow up with the patient.    New Prescriptions    No medications on file       Final diagnoses:   Nonintractable headache, unspecified chronicity pattern, unspecified headache type   Side effects of vaccination, initial encounter       Bernardino Schwarz  [facility] EMERGENCY DEPARTMENT  10/18/2023     Bernardino Schwarz MD  10/18/23 3920

## 2023-10-18 NOTE — DISCHARGE INSTRUCTIONS
Please continue to use Tylenol for headache pain at home.  Follow-up with his primary care doctor.  Return to the ED with any new or worsening symptoms.

## 2023-10-18 NOTE — ED NOTES
Group home called and states patient can go by taxi, taxi called for patient, no questions or concerns at this time of departure.

## 2023-10-18 NOTE — ED PROVIDER NOTES
ED Provider Note  [facility]      History     Chief Complaint   Patient presents with    Headache     Per Ambo, pt went to [facility] for headache, was offered meds, declined, and requested to have an ambo bring her to [place].  Pt states she is in here right now for stress and feels like she will pass out from stress    Stress     HPI  Sadaf Ross is a 23 year old female w with history of borderline personality disorder, electrolyte stability, frequent ED visits, presents the ED with concern for generalized headache which she relates to stress.  She states that she had an episode on 6 August where she hit her head against the wall.  She is not concerned for any significant injuries but does note some intermittent headache since that time.  She had a CT scan of her head at that time which I reviewed and shows no intracranial abnormalities.  She states that she has general agitation from feeling stressed.  She does not know why she feels this way.  States that everything has been going well at the group home.  Denies any homicidal or suicidal ideations.  Denies any vision change, neck pain, motor weakness, sensory deficit, or other symptoms related to her generalized headache.    Past Medical History  Past Medical History:   Diagnosis Date    ADHD (attention deficit hyperactivity disorder)     Bipolar 1 disorder (H)     Borderline personality disorder (H)     Depression     Depressive disorder     Intellectual disability     Obesity     Syncope      Past Surgical History:   Procedure Laterality Date    APPENDECTOMY      APPENDECTOMY       acetaminophen (TYLENOL) 325 MG tablet  albuterol (PROAIR HFA/PROVENTIL HFA/VENTOLIN HFA) 108 (90 Base) MCG/ACT inhaler  ARIPiprazole lauroxil ER (ARISTADA) 882 MG/3.2ML intra-muscular  bisacodyl (DULCOLAX) 5 MG EC tablet  calcium carbonate (TUMS) 500 MG chewable tablet  cyclobenzaprine (FLEXERIL) 10 MG tablet  dicyclomine (BENTYL) 20 MG  tablet  docusate sodium (COLACE) 50 MG capsule  famotidine (PEPCID) 20 MG tablet  FLUoxetine (PROZAC) 40 MG capsule  hydrocortisone, Perianal, (HYDROCORTISONE) 2.5 % cream  hydrOXYzine (ATARAX) 50 MG tablet  LORazepam (ATIVAN) 0.5 MG tablet  mupirocin (BACTROBAN) 2 % external ointment  OLANZapine zydis (ZYPREXA) 5 MG ODT  ondansetron (ZOFRAN) 4 MG tablet  ondansetron (ZOFRAN) 4 MG tablet  pantoprazole (PROTONIX) 40 MG EC tablet  polyethylene glycol (MIRALAX) 17 GM/Dose powder  promethazine (PHENERGAN) 12.5 MG tablet  sennosides (SENOKOT) 8.6 MG tablet  traZODone (DESYREL) 50 MG tablet  Vitamin D, Cholecalciferol, 25 MCG (1000 UT) TABS      Allergies   Allergen Reactions    Penicillins Rash and Unknown     Family History  Family History   Problem Relation Age of Onset    Diabetes Type 1 Father     Cancer Paternal Grandfather      Social History   Social History     Tobacco Use    Smoking status: Every Day     Packs/day: 3.00     Years: 5.00     Additional pack years: 0.00     Total pack years: 15.00     Types: Cigarettes    Smokeless tobacco: Never   Substance Use Topics    Alcohol use: No    Drug use: No         A medically appropriate review of systems was performed with pertinent positives and negatives noted in the HPI, and all other systems negative.    Physical Exam   BP: 124/88  Pulse: 96  Temp: 98.3  F (36.8  C)  Resp: 16  SpO2: 100 %  Physical Exam  Vitals and nursing note reviewed.   Constitutional:       General: She is not in acute distress.     Appearance: Normal appearance.   HENT:      Head: Normocephalic.      Nose: Nose normal.   Eyes:      Pupils: Pupils are equal, round, and reactive to light.   Cardiovascular:      Rate and Rhythm: Normal rate and regular rhythm.   Pulmonary:      Effort: Pulmonary effort is normal.   Abdominal:      General: There is no distension.   Musculoskeletal:         General: No deformity. Normal range of motion.      Cervical back: Normal range of motion.   Skin:      General: Skin is warm.   Neurological:      General: No focal deficit present.      Mental Status: She is alert and oriented to person, place, and time.      Cranial Nerves: No cranial nerve deficit.      Sensory: No sensory deficit.      Motor: No weakness.      Coordination: Coordination normal.   Psychiatric:         Attention and Perception: Attention normal.         Mood and Affect: Mood is anxious.         Speech: Speech normal.         Behavior: Behavior is cooperative.         Thought Content: Thought content normal.           ED Course, Procedures, & Data         No results found for any visits on 10/17/23.  Medications   ibuprofen (ADVIL/MOTRIN) tablet 800 mg (800 mg Oral $Given 10/17/23 1926)   acetaminophen (TYLENOL) tablet 1,000 mg (1,000 mg Oral $Given 10/17/23 1926)   hydrOXYzine (ATARAX) tablet 50 mg (50 mg Oral $Given 10/17/23 1926)     Labs Ordered and Resulted from Time of ED Arrival to Time of ED Departure - No data to display  No orders to display          Critical care was not performed.     Medical Decision Making  The patient's presentation was of moderate complexity (a chronic illness mild to moderate exacerbation, progression, or side effect of treatment).    The patient's evaluation involved:  review of 1 test result(s) ordered prior to this encounter (reviewed CTH as noted in HPI)    The patient's management necessitated moderate risk (prescription drug management including medications given in the ED).    Assessment & Plan    Patient presents the ED for evaluation of recurrence of chronic headache which she relates to stress.  She feels overall anxious.  Denies homicidal or suicidal ideation.  Exam without evidence of psychosis.  No indication for repeat mental health evaluation.    Has a nonfocal neuro exam.  Low suspicion for skull fracture, intracranial hemorrhage, or other more serious pathology.  She was given ibuprofen and acetaminophen.  She is also given hydroxyzine for her  anxiety.    On reassessment, her symptoms have completely resolved.  She is requesting discharge home.  Plan to follow-up with team of usual providers.  Educated reasons to return to the ED.      I have reviewed the nursing notes. I have reviewed the findings, diagnosis, plan and need for follow up with the patient.    Discharge Medication List as of 10/17/2023  9:09 PM          Final diagnoses:   Chronic nonintractable headache, unspecified headache type   Anxiety       Ghassan Browning DO  [facility] EMERGENCY DEPARTMENT  10/17/2023     Ghassan Browning,   10/17/23 2148       Ghassan Browning,   10/18/23 1741

## 2023-10-20 ENCOUNTER — NURSE TRIAGE (OUTPATIENT)
Dept: NURSING | Facility: CLINIC | Age: 23
End: 2023-10-20
Payer: MEDICARE

## 2023-10-20 ENCOUNTER — HOSPITAL ENCOUNTER (EMERGENCY)
Facility: CLINIC | Age: 23
Discharge: HOME | End: 2023-10-20
Attending: PSYCHIATRY & NEUROLOGY | Admitting: PSYCHIATRY & NEUROLOGY
Payer: MEDICARE

## 2023-10-20 ENCOUNTER — HOSPITAL ENCOUNTER (EMERGENCY)
Facility: CLINIC | Age: 23
End: 2023-10-20
Payer: MEDICARE

## 2023-10-20 VITALS
BODY MASS INDEX: 44.82 KG/M2 | HEART RATE: 99 BPM | WEIGHT: 253 LBS | TEMPERATURE: 98.1 F | SYSTOLIC BLOOD PRESSURE: 112 MMHG | OXYGEN SATURATION: 99 % | RESPIRATION RATE: 16 BRPM | DIASTOLIC BLOOD PRESSURE: 80 MMHG

## 2023-10-20 DIAGNOSIS — F43.0 ACUTE REACTION TO STRESS: ICD-10-CM

## 2023-10-20 DIAGNOSIS — F79 INTELLECTUAL DISABILITY: ICD-10-CM

## 2023-10-20 PROCEDURE — 99285 EMERGENCY DEPT VISIT HI MDM: CPT | Performed by: PSYCHIATRY & NEUROLOGY

## 2023-10-20 PROCEDURE — 99284 EMERGENCY DEPT VISIT MOD MDM: CPT | Performed by: PSYCHIATRY & NEUROLOGY

## 2023-10-20 NOTE — TELEPHONE ENCOUNTER
The patient is stating he is having suicidal ideations but no plan   Symptoms started today at 5 am  The patient reports she is in a group home called [facility]  Triage guidelines recommend to call 911  Caller verbalized and understands directives  Triager called 911 to have patient assessed for suicidal ideations.  Reason for Disposition   Patient is threatening suicide now    Additional Information   Negative: Patient attempted suicide    Protocols used: Depression-A-AH

## 2023-10-21 ENCOUNTER — HOSPITAL ENCOUNTER (EMERGENCY)
Facility: CLINIC | Age: 23
Discharge: HOME | End: 2023-10-21
Attending: EMERGENCY MEDICINE | Admitting: EMERGENCY MEDICINE
Payer: MEDICARE

## 2023-10-21 DIAGNOSIS — R45.851 SUICIDAL IDEATION: ICD-10-CM

## 2023-10-21 PROCEDURE — 99285 EMERGENCY DEPT VISIT HI MDM: CPT | Performed by: EMERGENCY MEDICINE

## 2023-10-21 PROCEDURE — 250N000013 HC RX MED GY IP 250 OP 250 PS 637: Performed by: EMERGENCY MEDICINE

## 2023-10-21 PROCEDURE — 99283 EMERGENCY DEPT VISIT LOW MDM: CPT | Performed by: EMERGENCY MEDICINE

## 2023-10-21 RX ORDER — OLANZAPINE 10 MG/1
10 TABLET, ORALLY DISINTEGRATING ORAL ONCE
Status: COMPLETED | OUTPATIENT
Start: 2023-10-21 | End: 2023-10-21

## 2023-10-21 RX ADMIN — OLANZAPINE 10 MG: 10 TABLET, ORALLY DISINTEGRATING ORAL at 17:54

## 2023-10-21 NOTE — CONSULTS
Diagnostic Evaluation Consultation  Crisis Assessment    Patient Name: Sadaf Ross  Age:  23 year old  Legal Sex: female  Gender Identity: female  Pronouns:   Race: White  Ethnicity: Not  or   Language: English      Patient was assessed: In person      Patient location: [facility] EMERGENCY DEPARTMENT                                 Referral Data and Chief Complaint  Sadaf Ross presents to the ED via EMS. Patient is presenting to the ED for the following concerns: Suicidal ideation.   Factors that make the mental health crisis life threatening or complex are:  Pt called nursing line today and expressed SI and threatened suicide.  Nurse then called 911.  Pt has hx of calling 911 herself and frequently presents in ED for SI.  Pt lives in group home where sharps and meds are secured.  Pt is calm and cooperative here in the ED.  She states that she started thinking about her dad who  in 2021.  There is also a resident who recently lost her family member which brought back memories of pt missing her dad.  Pt reports feeling reved up and not knowing what to do, shaking her legs and stating her head feels like it is going to explode.  When writer approached pt to do the interview, pt was calm, not fidgeting and on her phone.  Pt stated that she uses music, talking to people, and doing crossword puzzles to help calm her down.  PT stated she wanted to go home and do a crossword puzzle.  Pt is at baseline and will be discharged. Writer spent limited time with this pt due to pt's tendency to malinger and seek attention from ED staff which reinforces pt's desire to come to ED..      Informed Consent and Assessment Methods  Explained the crisis assessment process, including applicable information disclosures and limits to confidentiality, assessed understanding of the process, and obtained consent to proceed with the assessment.  Assessment methods included conducting a formal  interview with patient, review of medical records, collaboration with medical staff, and obtaining relevant collateral information from family and community providers when available.  : done     Patient response to interventions: acceptance expressed  Coping skills were attempted to reduce the crisis:  Pt named coping skills that help distract and calm her.     History of the Crisis   Pt has baseline SI with grief of losing her father. Pt continues to find ways to come to ED via ambulance.  Pt gets needs met in her group home including medicaition mgmt and keeping the environment safe.    Brief Psychosocial History  Family:  Single, Children no  Support System:  Grandparent(s)  Employment Status:  unemployed  Source of Income:  disability  Financial Environmental Concerns:  none  Current Hobbies:  music, puzzles  Barriers in Personal Life:       Significant Clinical History  Current Anxiety Symptoms:  excessive worry, anxious  Current Depression/Trauma:  hopelessness, sadness, thoughts of death/suicide  Current Somatic Symptoms:  sweating, flushing, shaking, anxious  Current Psychosis/Thought Disturbance:  impulsive  Current Eating Symptoms:     Chemical Use History:  Alcohol: None  Benzodiazepines: None  Opiates: None  Cocaine: None  Marijuana: None  Other Use: None   Past diagnosis:  ADHD, Anxiety Disorder, Bipolar Disorder, Depression, Personality Disorder, Suicide attempt(s)  Family history:  Anxiety Disorder, Depression  Past treatment:  Individual therapy, Case management, Psychiatric Medication Management, Supportive Living Environment (group home, penitentiary house, etc), Inpatient Hospitalization  Details of most recent treatment:     Other relevant history:          Collateral Information  Is there collateral information: Yes     Collateral information name, relationship, phone number:  [facility] staff:  [phone]    What happened today: Staff reported that nothing was happening today and that pt was at  baseline.     What is different about patient's functioning: Nothing is different and pt can return to group home.     Concern about alcohol/drug use:      What do you think the patient needs:      Has patient made comments about wanting to kill themselves/others:      If d/c is recommended, can they take part in safety/aftercare planning:       Additional collateral information:        Risk Assessment  [place] Suicide Severity Rating Scale Full Clinical Version:             [place] Suicide Severity Rating Scale Recent:   Suicidal Ideation (Recent)  Q1 Wished to be Dead (Past Month): yes  Q2 Suicidal Thoughts (Past Month): yes  Q3 Suicidal Thought Method: yes  Q4 Suicidal Intent without Specific Plan: no  Q5 Suicide Intent with Specific Plan: no  Level of Risk per Screen: moderate risk          Environmental or Psychosocial Events: loss of a loved one, helplessness/hopelessness  Protective Factors: Protective Factors: strong bond to family unit, community support, or employment, responsibilities and duties to others, including pets and children, lives in a responsibly safe and stable environment, help seeking, able to access care without barriers    Does the patient have thoughts of harming others? Feels Like Hurting Others: no  Previous Attempt to Hurt Others: no  Is the patient engaging in sexually inappropriate behavior?: no    Is the patient engaging in sexually inappropriate behavior?  no        Mental Status Exam   Affect: Appropriate  Appearance: Appropriate  Attention Span/Concentration: Attentive  Eye Contact: Engaged    Fund of Knowledge: Appropriate   Language /Speech Content: Fluent  Language /Speech Volume: Normal  Language /Speech Rate/Productions: Normal  Recent Memory: Intact  Remote Memory: Intact  Mood: Anxious, Sad  Orientation to Person: Yes   Orientation to Place: Yes  Orientation to Time of Day: Yes  Orientation to Date: Yes     Situation (Do they understand why they are here?):  Yes  Psychomotor Behavior: Normal  Thought Content: Clear  Thought Form: Intact     Mini-Cog Assessment  Number of Words Recalled:    Clock-Drawing Test:     Three Item Recall:    Mini-Cog Total Score:       Medication  Psychotropic medications:   Medication Orders - Psychiatric (From admission, onward)      None             Current Care Team  Patient Care Team:  [facility] as PCP - General (Clinic)  Chloe Alva APRN CNP as Nurse Practitioner (OB/Gyn)  Harley Esparza CNM as Assigned OBGYN Provider  Seble Jones PA-C as Physician Assistant    Diagnosis  Patient Active Problem List   Diagnosis Code    MENTAL HEALTH     Suicidal ideation R45.851    Suicidal ideations R45.851    Intellectual disability F79    Bipolar affective disorder, currently depressed, moderate (H) F31.32    Borderline personality disorder (H) F60.3    Morbid obesity (H) E66.01       Primary Problem This Admission  Active Hospital Problems    *Borderline personality disorder (H)      Suicidal ideation        Clinical Summary and Substantiation of Recommendations   Pt has continued ED visits due to ongoing SI, which is pt's baseline.  Pt reports SI today due to missing her dad.  Pt expressed reasons to live including her nephew and helping a group home resident.  Pt reports that she wants to go home and work on her crossword puzzles.  Pt is at baseline and safe to return to group home.                          Patient coping skills attempted to reduce the crisis:  Pt named coping skills that help distract and calm her.    Disposition  Recommended disposition: Group Home        Reviewed case and recommendations with attending provider. Attending Name: Dr. Nathen MD       Attending concurs with disposition: yes       Patient and/or validated legal guardian concurs with disposition:   yes       Final disposition:  discharge    Legal status on admission:      Assessment Details   Total duration spent with the patient: 5  min     CPT code(s) utilized: Non-Billable    Yarely Cruz Psychotherapist  [facility] - Triage & Transition Services  Callback: [phone]

## 2023-10-21 NOTE — ED NOTES
Bed: POLLO-B  Expected date: 10/21/23  Expected time:   Means of arrival:   Comments:  [address]  23yoF SI  yellow

## 2023-10-21 NOTE — DISCHARGE INSTRUCTIONS
"Aftercare Plan  If I am feeling unsafe or I am in a crisis, I will:   Contact my established care providers   Call the National Suicide Prevention Lifeline: 988  Go to the nearest emergency room   Call 911     Warning signs that I or other people might notice when a crisis is developing for me: thoughts of hurting self, increased anxiety.    Things I am able to do on my own to cope or help me feel better:  listen to music, do crossword puzzles.      Things that I am able to do with others to cope or help me better: Talk with support people:  friends and grandmother     Things I can use or do for distraction: listen to music, talk with people and do crossword puzzles.    Changes I can make to support my mental health and wellness: continue to see providers and follow recommendations    People in my life that I can ask for help: Group home staff, friends, family.    Your [facility] has a mental health crisis team you can call 24/7: [facility]  [phone]     Other things that are important when I'm in crisis: I can ask for help.    Additional resources and information:       Crisis Lines  Crisis Text Line  Text 411846  You will be connected with a trained live crisis counselor to provide support.    Por espanol, texto  SIRENA a 696442 o texto a 442-AYUDAME en WhatsApp    The [employer] (LGBTQ Youth Crisis Line)  4.225.273.5478  text START to 588-753      Community Resources  Fast Tracker  Linking people to mental health and substance use disorder resources  fasttrackermn.org     [place] Mental Health Warm Line  Peer to peer support  Monday thru Saturday, 12 pm to 10 pm  566.804.3934 or 7.198.527.1007  Text \"Support\" to 96013    National [place] on Mental Illness (MAGY)  662.016.9186 or 1.888.MAGY.HELPS      Mental Health Apps  My3  https://my[employer].pp.org/    VirtualHopeBox  https://Collaborate.com.org/apps/virtual-hope-box/      Additional Information  Today you were seen by a licensed " mental health professional through Triage and Transition services, Behavioral Healthcare Providers ([facility])  for a crisis assessment in the Emergency Department at [facility].  It is recommended that you follow up with your established providers (psychiatrist, mental health therapist, and/or primary care doctor - as relevant) as soon as possible. Coordinators from [facility] will be calling you in the next 24-48 hours to ensure that you have the resources you need.  You can also contact [facility] coordinators directly at [phone]. You may have been scheduled for or offered an appointment with a mental health provider. [facility] maintains an extensive network of licensed behavioral health providers to connect patients with the services they need.  We do not charge providers a fee to participate in our referral network.  We match patients with providers based on a patient's specific needs, insurance coverage, and location.  Our first effort will be to refer you to a provider within your care system, and will utilize providers outside your care system as needed.          urinary catheter complications

## 2023-10-21 NOTE — ED PROVIDER NOTES
ED Provider Note  [facility]      History     Chief Complaint   Patient presents with    Suicidal     SI due to not getting sleep at group home; states there are cock roaches at the group home      HPI  Sadaf Ross is a 23 year old female who presents to the ED BIBA from  for SI. She reports not sleeping much recently. She reports having stronger than usual SI all day. She states she is a bad person for having SI.    Hx of SI, intellectual disability, bipolar affective disorder, anxiety, and borderline personality disorder.    She frequently visits the ED and has a Care Plan.    Past Medical History  Past Medical History:   Diagnosis Date    ADHD (attention deficit hyperactivity disorder)     Bipolar 1 disorder (H)     Borderline personality disorder (H)     Depression     Depressive disorder     Intellectual disability     Obesity     Syncope      Past Surgical History:   Procedure Laterality Date    APPENDECTOMY      APPENDECTOMY       acetaminophen (TYLENOL) 325 MG tablet  albuterol (PROAIR HFA/PROVENTIL HFA/VENTOLIN HFA) 108 (90 Base) MCG/ACT inhaler  ARIPiprazole lauroxil ER (ARISTADA) 882 MG/3.2ML intra-muscular  bisacodyl (DULCOLAX) 5 MG EC tablet  calcium carbonate (TUMS) 500 MG chewable tablet  cyclobenzaprine (FLEXERIL) 10 MG tablet  dicyclomine (BENTYL) 20 MG tablet  docusate sodium (COLACE) 50 MG capsule  famotidine (PEPCID) 20 MG tablet  FLUoxetine (PROZAC) 40 MG capsule  hydrocortisone, Perianal, (HYDROCORTISONE) 2.5 % cream  hydrOXYzine (ATARAX) 50 MG tablet  LORazepam (ATIVAN) 0.5 MG tablet  mupirocin (BACTROBAN) 2 % external ointment  OLANZapine zydis (ZYPREXA) 5 MG ODT  ondansetron (ZOFRAN) 4 MG tablet  ondansetron (ZOFRAN) 4 MG tablet  pantoprazole (PROTONIX) 40 MG EC tablet  polyethylene glycol (MIRALAX) 17 GM/Dose powder  promethazine (PHENERGAN) 12.5 MG tablet  sennosides (SENOKOT) 8.6 MG tablet  traZODone (DESYREL) 50 MG tablet  Vitamin D, Cholecalciferol,  25 MCG (1000 UT) TABS      Allergies   Allergen Reactions    Penicillins Rash and Unknown     Family History  Family History   Problem Relation Age of Onset    Diabetes Type 1 Father     Cancer Paternal Grandfather      Social History   Social History     Tobacco Use    Smoking status: Every Day     Packs/day: 3.00     Years: 5.00     Additional pack years: 0.00     Total pack years: 15.00     Types: Cigarettes    Smokeless tobacco: Never   Substance Use Topics    Alcohol use: No    Drug use: No      Past medical history, past surgical history, medications, allergies, family history, and social history were reviewed with the patient. No additional pertinent items.      A complete review of systems was performed with pertinent positives and negatives noted in the HPI, and all other systems negative.    Physical Exam      Physical Exam  General: awake, alert, NAD  Head: normal cephalic  HEENT: pupils equal, conjugate gaze intact  Neck: Supple  Lungs: Breathing comfortably on room air   EXT: no deformities noted of upper or lower extremities  Neuro: awake, answers questions appropriately. No focal deficits noted   Psych: On initial evaluation patient is tearful stating that she feels increasing suicidal ideation compared to baseline that she feels guilty about this.  On repeat evaluation patient is calm, cooperative, states she is feeling better and feels that she is at baseline.    ED Course, Procedures, & Data      Procedures                     No results found for any visits on 10/21/23.  Medications   OLANZapine zydis (zyPREXA) ODT tab 10 mg (10 mg Oral $Given 10/21/23 9208)     Labs Ordered and Resulted from Time of ED Arrival to Time of ED Departure - No data to display  No orders to display          Critical care was not performed.     Medical Decision Making  The patient's presentation was of moderate complexity (a chronic illness mild to moderate exacerbation, progression, or side effect of treatment).    The  patient's evaluation involved:  review of external note(s) from 3+ sources (see separate area of note for details)    The patient's management necessitated moderate risk (prescription drug management including medications given in the ED).    Assessment & Plan    Sadaf presents to the emergency department for suicidal ideation.  On my initial evaluation she is tearful, endorsing suicidal ideation.  She was seen here yesterday for similar had a mental health assessment was deemed to be at baseline and discharged home.    Here she was given oral Zyprexa.    Plan was to have a mental health assessment to have patient reevaluated however patient shortly thereafter reported that she was feeling that she was back at baseline and was requesting discharge.  Patient is in a highly structured environment at her group home.  Patient is able to be safe at a group home under the amount of supervision that she has and is safe to discharge home.  She was discharged home per her care plan.  She is comfortable with this.    I have reviewed the nursing notes. I have reviewed the findings, diagnosis, plan and need for follow up with the patient.    I have also reviewed and interpreted all laboratory and imaging studies that are available.    New Prescriptions    No medications on file       Final diagnoses:   Suicidal ideation   I, Justin Pandey, am serving as a trained medical scribe to document services personally performed by Bernardino Schwarz MD based on the provider's statements to me on October 21, 2023.  This document has been checked and approved by the attending provider.    I, Bernardino Schwarz MD, was physically present and have reviewed and verified the accuracy of this note documented by Justin Pandey medical scribe.      Bernardino Schwarz MD  [facility] EMERGENCY DEPARTMENT  10/21/2023     Bernardino Schwarz MD  10/21/23 1829

## 2023-10-21 NOTE — ED TRIAGE NOTES
Patient arrives from group home with c/o light headedness, body aches, nausea, and wanting to bang her head against a wall due to stress related to her dad's death anniversary coming up in February.     Rick Gonzalez RN on 10/20/2023 at 7:28 PM     Triage Assessment (Adult)       Row Name 10/20/23 1915          Triage Assessment    Airway WDL WDL        Respiratory WDL    Respiratory WDL WDL        Skin Circulation/Temperature WDL    Skin Circulation/Temperature WDL WDL        Cardiac WDL    Cardiac WDL WDL        Peripheral/Neurovascular WDL    Peripheral Neurovascular WDL WDL        Cognitive/Neuro/Behavioral WDL    Cognitive/Neuro/Behavioral WDL WDL

## 2023-10-21 NOTE — ED NOTES
Spoke to Pt and Pt is refusing Dec assessment, and requests to go home. Pt agrees that if she goes home she is able to keep herself safe.Pt unable to set up her own transport this evening. Pt will be sent home by taxi.

## 2023-10-21 NOTE — DISCHARGE INSTRUCTIONS
Aftercare Plan  If I am feeling unsafe or I am in a crisis, I will:   Contact my established care providers   Call the National Suicide Prevention Lifeline: 979.278.8390   Go to the nearest emergency room   Call 911     Warning signs that I or other people might notice when a crisis is developing for me:     I am having increasing suicidal thoughts that turn to plans with intent or means  I am having additional urges to self-harm    My emotions are of hopelessness; feeling like there's no way out.  Rage or anger.  Engaging in risky activities without thinking  Withdrawing from family/friends  Dramatic mood swings  Drastic personality changes   Use of alcohol or drugs  Postings on social media  Neglect of personal hygiene or cares     Things I am able to do on my own to cope or help me feel better:    Spending quality time with loved ones  Staying hydrated  Eating balanced meals  Going for a walk every day  Take care of daily responsibilities/needs  Focus on positive self-talk vs negative self-talk    Things that I am able to do with others to cope or help me better:   Exercise  Music  Deep breathing  Meditations  Journal  Self-regulate  Self check-in  Ask for help    Things I can use or do for distraction:   Reach out to/spend time with family, friends  Shower  Exercise  Chores or do a project  Listen to music  Watch movie/TV  Listening to music  Journaling  Reading a book  Meditating  Call a friend    Changes I can make to support my mental health and wellness:    -I will abstain from all mood altering chemicals not currently prescribed to me   -I will attend scheduled mental health therapy and psychiatric appointments and follow all   recommendations  -I will commit to 30 minutes of self care daily - this can be as simple as taking a shower, going for a   walk, cooking a meal, read, writing, etc  -I will practice square breathing when I begin to feel anxious - in breath through the nose for the count   of 4 and  the first line on the square. Out breath through the mouth for the count of 4 for the second line   of the square. Repeat to complete the square. Repeat the square as many times as needed.  - I will use distraction skills of: going for walks, watching TV, spending time outside, calling a friend or   family member  -Use community resources, including [place] numbers, [facility] crisis and support meetings  -Maintain a daily schedule/routine  -Practice deep breathing skills  -Download a meditation kervin and spend 15-20 minutes per day mediating/relaxing. Some apps to   download include: Calm, Headspace and Insight Timer. All 3 of these apps have free version    Reduce Extreme Emotion  QUICKLY:  Changing Your Body Chemistry      T:  Change your body Temperature to change your autonomic nervous system   Use Ice Water to calm yourself down FAST   Put your face in a bowl of ice water (this is the best way; have the person keep his/her face in ice water for 30-45 seconds - initial research is showing that the longer s/he can hold her/his face in the water, the better the response), or   Splash ice water on your face, or hold an ice pack on your face      I:  Intensely exercise to calm down a body revved up by emotion   Examples: running, walking fast, jumping, playing basketball, weight lifting, swimming, calisthenics, etc.   Engage in exercises that DO NOT include violent behaviors. Exercises that utilize violent behaviors tend to function as  behavioral rehearsal,  and rather than calming the person down, may actually  rev  the person up more, increasing the likelihood of violence, and lessening the likelihood that they will  burn off  energy     P:  Progressively relax your muscles   Starting with your hands, moving to your forearms, upper arms, shoulders, neck, forehead, eyes, cheeks and lips, tongue and teeth, chest, upper back, stomach, buttocks, thighs, calves, ankles, feet   Tense (10 seconds,   of the way), then relax  each muscle (all the way)   Notice the tension   Notice the difference when relaxed (by tensing first, and then relaxing, you are able to get a more thorough relaxation than by simply relaxing)      P: Paced breathing to relax   The standard technique is to begin with counting the number of steps one takes for a typical inhale, then counting the steps one takes for a typical exhale, and then lengthening the amount of steps for the exhalation by one or two steps.  OR  Repeat this pattern for 1-2 minutes  Inhale for four (4) seconds   Exhale for six (6) to eight (8) seconds   Research demonstrated that one can change one's overall level of anxiety by doing this exercise for even a few minutes per day      People in my life that I can ask for help:   Family  Friends  Providers    Your county has a mental health crisis team you can call 24/7:   [facility] Crisis Line Number: [phone]  [facility] Mental Health Crisis: [phone] - Call the crisis line for immediate mental health support, 24 hours a day.   [facility] Crisis Line Number: [phone]  [facility] Crisis Line Number: [phone]  [facility] Crisis Line Number: [phone]   [facility] Crisis Line Number: [phone]  [place]: [phone]  [place]: [phone]  [facility] Crisis Number: [phone]  [facility] Crisis: [phone]  [facility] Crisis: [phone]    Other things that are important when I'm in crisis:   Ask for help    Additional resources and information:     Mental Health Apps  My3  https://myCity BeBepp.org/    VirtualHopeBox  https://Joobili.org/apps/virtual-hope-box/       Professionals or Agencies I Can Contact During A Crisis:       Crisis Lines  Call or Text 988 - National Suicide and Crisis Lifeline    Crisis Text Line  Text 074707  You will be connected with a trained live crisis counselor to provide support.    [employer] (LGBTQ Youth  "Crisis Line)  8.702.964.4952  text START to 003-112    National [place] on Mental Illness (MAGY)  160.223.1530 or 1.631.MAGY.HELPS    National Suicide Prevention Lifeline at 9-618-918-NWLL (4033)     Throughout  [place]: call **CRISIS (**595091)     Crisis Text Line: is available for free, 24/7 by texting [place] to 891946    Community Resources  Fast Tracker  Linking people to mental health and substance use disorder resources  [employer]     [place] Mental Health Warm Line  Peer to peer support  Monday thru Saturday, 12 pm to 10 pm  674.614.8189 or 1.117.167.3527  Text \"Support\" to 61416     National [place] on Mental Illness (www.[place].magy.org): 466.942.4336 or 445-267-5094     [facility] Phone (free remote counseling): 271.296.6282 Web address:   https://MobileSuites.org/     www.Rocketskates (filter for insurance, gender preference, etc.)    [facility]   [phone]  Intake appointment will be virtual, following appointments can be in person or virtual.   **IMMEDIATE OPENINGS**    [facility]  [phone]  *offers individual therapy, medication management and Mental Health Case Workers; can self refer    [facility]  [phone]  *Immediate Openings    [facility]  [phone]  *Immediate Openings    [facility]  [phone]  *Immediate Openings    Please follow up with scheduled providers to ensure all necessary paperwork is filled out prior to your   scheduled telehealth appointments.     Coordinators from [facility] will be calling within two business days to ensure   that you have the resources you may need or provide assistance with scheduling (Phone number: [phone].).    Remember: give the referrals 3 sessions prior to calling it quits. Do you trust them? Do you feel   understood? Do you think they can help? Check in with yourself after each session    Please reach " out to the [facility]([phone]) regarding further mental health appointment needs for this emergency department visit.    [facility] SCHEDULING:  Today you were seen by a licensed mental health professional through [facility], [facility] ([facility])  for a crisis assessment in the Emergency Department at [facility].  It is recommended that you follow up with your estabished providers (psychiatrist, menta health therapist, and/or primary care doctor - as relevant) as soon as possible. Coordinators from [facility] will be calling you in the next 24-48 hours to ensure that you have the resources you need.  You can so contact [facility] coordinators directly at [phone].     [facility] maintains an extensive network of licensed behavioral health providers to connect patients with the services they need.  We do not charge providers a fee to participate in our referral network.  We match patients with providers based on a patient s specific needs, insurance coverage, and location.  Our first effort will be to refer you to a provider within your care system, and will utilize providers outside your care system as needed.

## 2023-10-22 ENCOUNTER — HOSPITAL ENCOUNTER (EMERGENCY)
Facility: CLINIC | Age: 23
Discharge: HOME | End: 2023-10-22
Attending: EMERGENCY MEDICINE | Admitting: EMERGENCY MEDICINE
Payer: MEDICARE

## 2023-10-22 VITALS
OXYGEN SATURATION: 96 % | DIASTOLIC BLOOD PRESSURE: 90 MMHG | HEART RATE: 91 BPM | RESPIRATION RATE: 18 BRPM | SYSTOLIC BLOOD PRESSURE: 144 MMHG | TEMPERATURE: 98.8 F

## 2023-10-22 DIAGNOSIS — F43.0 ACUTE REACTION TO SITUATIONAL STRESS: ICD-10-CM

## 2023-10-22 LAB — TROPONIN T BLD-MCNC: 0 UG/L

## 2023-10-22 PROCEDURE — 93010 ELECTROCARDIOGRAM REPORT: CPT | Performed by: EMERGENCY MEDICINE

## 2023-10-22 PROCEDURE — 84484 ASSAY OF TROPONIN QUANT: CPT

## 2023-10-22 PROCEDURE — 93005 ELECTROCARDIOGRAM TRACING: CPT | Performed by: EMERGENCY MEDICINE

## 2023-10-22 PROCEDURE — 99284 EMERGENCY DEPT VISIT MOD MDM: CPT | Performed by: EMERGENCY MEDICINE

## 2023-10-22 PROCEDURE — 99284 EMERGENCY DEPT VISIT MOD MDM: CPT | Mod: 25 | Performed by: EMERGENCY MEDICINE

## 2023-10-22 ASSESSMENT — ACTIVITIES OF DAILY LIVING (ADL): ADLS_ACUITY_SCORE: 37

## 2023-10-22 NOTE — ED NOTES
Pt discharged home at this time, ambulatory. Pt provided w/ printed and verbal discharge instructions at this time. Pt verbalized understanding of instructions and questions answered at this time. Pt stable at time of discharge.

## 2023-10-22 NOTE — ED NOTES
Bed: UREDH-B  Expected date:   Expected time:   Means of arrival:   Comments:  N 703 23f SI on hold

## 2023-10-22 NOTE — ED TRIAGE NOTES
SI with the plan to bite herself.      Triage Assessment (Adult)       Row Name 10/22/23 1651          Respiratory WDL    Respiratory WDL WDL        Skin Circulation/Temperature WDL    Skin Circulation/Temperature WDL WDL        Cardiac WDL    Cardiac WDL WDL        Peripheral/Neurovascular WDL    Peripheral Neurovascular WDL WDL

## 2023-10-22 NOTE — ED PROVIDER NOTES
ED Provider Note  [facility]      History     Chief Complaint   Patient presents with    Suicidal     BIB from  for SI. Cooperative in route. Per report, pt bit self at , no marks noted by EMS. . VSS      HPI  Sadaf Ross is a 23 year old female PMH of SLE, BPD, bipolar disorder who presents to the ER for evaluation of SI.    Has been stressed about the anniversary of her father's death.  When she gets this way she has symptoms of shakiness, chest pain, back pain, stomach pain, headache, lower back pain, chills.  She states her mind is racing, she does Crosswords to distract herself and listens to music.  She denies any fevers, no new urinary symptoms.  No new cough or difficulty breathing.  No SI reported.    Past Medical History  Past Medical History:   Diagnosis Date    ADHD (attention deficit hyperactivity disorder)     Bipolar 1 disorder (H)     Borderline personality disorder (H)     Depression     Depressive disorder     Intellectual disability     Obesity     Syncope      Past Surgical History:   Procedure Laterality Date    APPENDECTOMY      APPENDECTOMY       acetaminophen (TYLENOL) 325 MG tablet  albuterol (PROAIR HFA/PROVENTIL HFA/VENTOLIN HFA) 108 (90 Base) MCG/ACT inhaler  ARIPiprazole lauroxil ER (ARISTADA) 882 MG/3.2ML intra-muscular  bisacodyl (DULCOLAX) 5 MG EC tablet  calcium carbonate (TUMS) 500 MG chewable tablet  cyclobenzaprine (FLEXERIL) 10 MG tablet  dicyclomine (BENTYL) 20 MG tablet  docusate sodium (COLACE) 50 MG capsule  famotidine (PEPCID) 20 MG tablet  FLUoxetine (PROZAC) 40 MG capsule  hydrocortisone, Perianal, (HYDROCORTISONE) 2.5 % cream  hydrOXYzine (ATARAX) 50 MG tablet  LORazepam (ATIVAN) 0.5 MG tablet  mupirocin (BACTROBAN) 2 % external ointment  OLANZapine zydis (ZYPREXA) 5 MG ODT  ondansetron (ZOFRAN) 4 MG tablet  ondansetron (ZOFRAN) 4 MG tablet  pantoprazole (PROTONIX) 40 MG EC tablet  polyethylene glycol (MIRALAX) 17 GM/Dose  powder  promethazine (PHENERGAN) 12.5 MG tablet  sennosides (SENOKOT) 8.6 MG tablet  traZODone (DESYREL) 50 MG tablet  Vitamin D, Cholecalciferol, 25 MCG (1000 UT) TABS      Allergies   Allergen Reactions    Penicillins Rash and Unknown     Family History  Family History   Problem Relation Age of Onset    Diabetes Type 1 Father     Cancer Paternal Grandfather      Social History   Social History     Tobacco Use    Smoking status: Every Day     Packs/day: 3.00     Years: 5.00     Additional pack years: 0.00     Total pack years: 15.00     Types: Cigarettes    Smokeless tobacco: Never   Substance Use Topics    Alcohol use: No    Drug use: No         A medically appropriate review of systems was performed with pertinent positives and negatives noted in the HPI, and all other systems negative.    Physical Exam   BP: (!) 144/90  Pulse: 91  Temp: 98.8  F (37.1  C)  Resp: 18  SpO2: 96 %  Physical Exam  General: awake, alert, NAD  Head: normal cephalic  HEENT: pupils equal, conjugate gaze intact  Neck: Supple  CV: regular rate and rhythm without murmur  Lungs: clear to auscultation  Abd: soft, non-tender  EXT: lower extremities without deformities  Neuro: awake, answers questions appropriately. No focal deficits noted   Psych: Denies suicidal ideation.  Makes good eye contact.    ED Course, Procedures, & Data      Procedures            EKG Interpretation:      Interpreted by Bernardino Schwarz MD  Time reviewed: 1743  Symptoms at time of EKG: SI   Rhythm: normal sinus   Rate: 91 BPM  Axis: normal  Ectopy: none  Conduction: normal  ST Segments/ T Waves: No ST-T wave changes  Q Waves: none  Comparison to prior: Unchanged from 8/6/23    Clinical Impression: no signs of acute ischemia or arhythmia.                 No results found for any visits on 10/22/23.  Medications - No data to display  Labs Ordered and Resulted from Time of ED Arrival to Time of ED Departure - No data to display  No orders to display          Critical care  was not performed.     Medical Decision Making  The patient's presentation was of moderate complexity (a chronic illness mild to moderate exacerbation, progression, or side effect of treatment).    The patient's evaluation involved:  review of external note(s) from 3+ sources (see separate area of note for details)  Ordering of 2 test and this visit    The patient's management necessitated moderate risk (limitations due to social determinants of health (see separate area of note for details)).    Assessment & Plan    Patient is well-known to this emergency department.  Patient has a care plan in place to try to minimize use of the emergency department.  She is suicidal at baseline and has good support and safety in place at her group home.    Of note her initial triage complaint was suicidal but when I evaluated her she says she does not feel suicidal.  She reports that she is having typical physical symptoms that she is Soults with when she starts feeling with her dad's death including feeling shaky with chest pain back pain stomach pain etc. which she notes is typical for her.  I have seen her in the past many times with these similar symptoms.  Will obtain an EKG and troponin but I have very low suspicion for any acute pathology given the chronicity of her symptoms and how this is typical for her.    Patient's vital signs are unremarkable.  Heart lung exam is normal.  Upon my evaluation patient is requesting discharge.  Plan is to discharge if EKG and troponin is normal.    EKG is unchanged from baseline, troponin is 0.     Patient reports that she is feeling better and would like to be discharged.  She will go back to her group home which is already been establishes a safe and therapeutic environment for her and can accommodate her complex needs.        I have reviewed the nursing notes. I have reviewed the findings, diagnosis, plan and need for follow up with the patient.    New Prescriptions    No medications  on file       Final diagnoses:   Acute reaction to situational stress     I, Kristina Ibarra, am serving as a trained medical scribe to document services personally performed by Bernardino Schwarz MD, based on the provider's statements to me.     I, Bernardino Schwarz MD, was physically present and have reviewed and verified the accuracy of this note documented by Kristina Ibarra.    Bernardino Schwarz MD  [facility] EMERGENCY DEPARTMENT  10/22/2023     Bernardino Schwarz MD  10/22/23 2029

## 2023-10-23 LAB
ATRIAL RATE - MUSE: 91 BPM
DIASTOLIC BLOOD PRESSURE - MUSE: NORMAL MMHG
INTERPRETATION ECG - MUSE: NORMAL
P AXIS - MUSE: 41 DEGREES
PR INTERVAL - MUSE: 166 MS
QRS DURATION - MUSE: 84 MS
QT - MUSE: 354 MS
QTC - MUSE: 435 MS
R AXIS - MUSE: 16 DEGREES
SYSTOLIC BLOOD PRESSURE - MUSE: NORMAL MMHG
T AXIS - MUSE: -2 DEGREES
VENTRICULAR RATE- MUSE: 91 BPM

## 2023-10-23 NOTE — CONSULTS
"[facility] Crisis Reassessment      Sadaf Ross was reassessed at the request of Dr. Pelaez for the following reasons: return to the ED with suicidal thinking and hallucinations. Pt has had multiple ED visits in the past few consecutive days, latest re-assessment being this morning. See the initial assessment note and re-assessment notes for details.    Patient Presentation    Initial ED presentation details: Per ED note 9/22/22: Patient presents to ED with suicidal thoughts and plan and verbalized plan of killing by \"bitting self and hitting head on wall\". Patient states suicidal thoughts started yesterday after leaving ED. Denies alcohol or drug use. Verbalized visual hallucination and states \"I am seeing spirits of dead people\". Denies homicidal thoughts or plan. Verbalized generalized body ache and rates pain as \"9\" on pain scale. Calm and cooperating with assessment.    Per  staff Arlene, ([phone]): Writer spoke with the  staff and they said she had her DE today, returned home, said she thinks \"I need to go back.\" referring to the ED.  staff encouraged her to take a medication and work through it. She sat by the side of the road and started calling 911 multiple times. \"I need to go back to hospital.\" Pt was unable to be redirected, per  staff.  staff report this has become a pattern now, to immediately go to the ED. Staff expressed confusion as to how the DE became so difficult for her. Staff stated this is \"just what she does\" and expressed agreeableness to return her to the  and would pick her up if the hospital recommended it. Most of the time she just talks about harming herself. Everything is already secure, can't take phone away as it's a rights restriction. Has the 1:1 supervision at home. She left the hospital just to get the shot and then return. She calls the hospital her \"job\" and claims to be the \"\" of the hospital. Therapist has been working on guardianship, regular " "versus emergency guardianship. Will do this pattern for 1-2 weeks and then be \"okay.\"    Brief Psychosocial History     The patient lives in group home in [place] and has been there about two years.  Prior to that, she was at [facility] for 3 months.  The patient is a high school graduate from [place], and she had an IEP.  No known family mental health issues. She currently lives in a group home with other roomates. She is her own guardian.  Pauline shared that they have attempted to have her find her own guardian, but the process has not moved along.  She lost her father in February and feels very sad about this loss.  She does not work.  She typically enjoys her roomates and friends.       Significant Clinical History    Recent multiple ED visits in the last week.  ED 9/16,9/17, 9/19-21, 9/21, 9/22. She has diagnostic history of Bipolar I, Depression, Borderline Personality Disorder, and ADHD. Pt has multiple past inpatient admissions (last November 21, 2021 at [facility]), multiple ED visits with c/o abdominal pain, suicidal pain or hallucinations, has resided in current group home for 2 years. She has been in the ED for evaluations three consecutive days and many other recent days this month.    Pt re-assessment this morning (9/22/22) by Triage and Transition staff Amy LOPES/YASMINE at 11:25A: Met with Sadaf she reports being severely suicidal with plan to bit herself.  She reports the people at the group home love her though they won't engage with her.  She says she just wants to die and being with her dad.  Writer attempted to work on coping skills and make a plan for for stabilization at the group home, she reports nothing works.  She was laughing and easily engaged, shared some of the fun times she had with her dad fishing, biking, hiking and going out to eat.  She missed him a lot.  We talked about grief and loss and staying positive.  Sadaf continues to say she is suicidal, though doesn't have " "any idea how going inpatient could be beneficial to her. At time of assessment pt reported stable symptoms. Patient is not making progress towards treatment goals as evidenced by unwillingness to work on coping skills and just wants to stay in the hospital because people engage with her. She was discharged with recommendation of returning to  and continue with established outpatient services. Consider grief support group for additional support.    9/21/22: Sadaf Ross was assessed at the request of Dr. Sena for the following reasons: Pt seen September 18 with recommendation for discharge back to , however the following say there is a notation from Central Intake that DEC  Monica was recommending admission.  Pt remained in the ED until this morning when she was discharged back to  which pt and  staff supported.  Pt now returns again reporting hallucinations telling her to kill herself and some minor biting (does not break skin) on her thumb.  Pt was first seen on September 18, 2022 by Chloe Brock; see the initial assessment note for details.     Pt reports she did not want to return to the group home this morning because \"I wasn't stable\" reports she hears voices all the time, medications don't help, has worked with her therapist on coping but only finds music to be helpful (classic country).  She does not indicate anything different that occurred at the group home to cause an increase in symptoms, other than she wasn't stable.  She concurs with discharge note that staff like her, says \"they want me to be back there and I want to but not until I'm stable\".  She says once back the voices told her to jump off a bridge.  She reports she bites herself and bangs her head in self injury, in the past has \"almost\" bitten hard enough to break the skin.  She denies suicidal or violent intent, reports voices remain present, however no marked interference noted during the interview.  She denies drug or " "alcohol use but voices a \"wish\" that she could, indicates she has used marijuana in the past and \"almost\" meth.       Spoke with Arlene at the group home ([phone]) who says pt returned in the morning and said she was \"feeling much better\", ate lunch then took a nap.  She then got up and went for a walk with staff observing.  She returned from her walk and said the voices were telling her to kill herself, refused PRN (Hydroxyzine) and called 911 herself asking to come back to the hospital.  Arlene saw nothing different from her usual demeanor, says pt consistently reports the presences of voices.  Arlene says pt can return, she is scheduled to get her monthly IM shot of Abilify (Aristada) tomorrow.    9/20/22: Patient returned early this morning from ED visit on 9/18.  She did not sleep and was up in the group home.  Group home employee Arlene Casey indicated client was agitated, talking about choking and stabbing housemates, and crying.  Pauline also reported that client met with [facility] worker and took a walk and still was upset when she returned so called 911.  Patient was present in the ED the last several days and discharged to group home.  Presenting with same concerns of homicidal ideation, crying, seeing her father in person, feeling sad about loss of father and wanting to tell him she's sorry for mistreating him.  These symptoms have been present for several days.  She has attempted to resolve these symptoms by speaking with her [facility] worker.    Clinical Substantiation of Recommendations    After therapeutic assessment, intervention and aftercare planning by ED care team and [facility] and in consultation with attending provider, the patient's circumstances and mental state were appropriate for outpatient management. It is the recommendation of this clinician that pt discharge with OP MH support. A this time the pt is not presenting as an acute risk to self or others due to the following factors: Pt has been " in the ED several conseutive days and many days overall just in the past 2 weeks. It has been observed by clinical staff that she has proven to show unwillingness to participate in therapeutic interventions while in the ED and inpatient and it continues to not prove beneficial for her to remain at the hospital. Pt appears to appreciate the therapeutic interactions and time given to her by staff. It is in the patient's best interest for her to return to her group home and apply mental health interventions she knows to better manage her symptoms including her established community and home supports she already has access to.     After assessment by Morgan Robles ([facility]) yesterday he recommended: Pt presents for assessment of suicidal thoughts and psychotic symptoms in a pt with limited coping skills, risk elevated at baseline, no acute change in symptoms, pt currently in residential placement with 24/7 care and monitoring, inpatient admission not believed to be appropriate as symptoms are chronically present and pt has minimal history of acting on thoughts which would put her in acute danger (self injury is superficial), recommend completion of medication evaluation, discharge, take medications as prescribed, keep scheduled appointments, utilize community crisis services or ED if symptoms worsen.    After re-assessment by triage and transition staff later this morning, she was discharged with the recommendation of returning to  and continue with established outpatient services. Consider grief support group for additional support. This was supported by the assigned attending, Dr. Romero.    Medication     Psychotropic medications: Yes, Astrada, Effexor, Trazodone, Hydroxyzine, Cogentin   Medication changes made in the last two weeks: No    Current Care Team     Primary Care Provider: Yes, Jess Wilkins MD - [facility]  Psychiatrist: Yes, Emma Jacobson NP - [facility]  Therapist: Yes, Rd Barton MA, [facility]  [facility]  : Yes  Other: Yes, group home     Diagnosis     Borderline Personality D/O F60.3  Unspecified Intellectual Disability F79  Bipolar, unspecified F31.9 by history    Plan:    Disposition  Recommended disposition: return to group home with established community providers    Reviewed case and recommendations with attending provider. Attending Name:   Dr. Pelaez    Attending concurs with disposition: Yes      Patient concurs with disposition: No:        Final disposition: return to group home with established community providers    Assessment Details  Total duration spent on the patient case in minutes: 1.50 hrs     CPT code(s) utilized: 26329 - Psychotherapy for Crisis - 60 (30-74*) min and 93711 - Psychotherapy for Crisis (Each additional 30 minutes) - 30 min       ZEESHAN Little, LICSW, LMHP  DEC - Triage & Transition Services  Callback: [phone]    Aftercare Plan    If I am feeling unsafe or I am in a crisis, I will:   Contact my established care providers   Call the National Suicide Prevention Lifeline: 123.548.6886   Go to the nearest emergency room   Call 902     Warning signs that I or other people might notice when a crisis is developing for me: isolation, loss of appetite, increase in depression, loss of interest in preferred activities, suicidal thoughts, suicide attempt, excessive or uncontrollable crying; increased aggression; self injurious behavior    Things I am able to do on my own to cope or help me feel better:     Grounding Techniques:    Try to notice where you are, your surroundings including the people, the sounds like the TV or radio.    Concentrate on your breathing. Take a deep cleansing breath from your diaphragm. Count the breaths as you exhale. Make sure you breath slowly.    Hold something that you find comforting, for some it may be a stuffed animal or a blanket. Notice how it feels in your hands. Is it hard or soft?    During a non-crisis time make a list of  positive affirmations. Print them out and keep them handy for times of intense anxiety. At those times, read them aloud.  Try the [employer] game:    Name 5 things you can see in the room with you    Name 4 things you can feel ( chair on my back  or  feet on floor )     Name 3 things you can hear right now ( people talking  or  tv )     Name 2 things you can smell right now (or, 2 things you like the smell of)     Name 1 good thing about yourself  Create A Safe Place    Image a safe place -- it can be a real or imaginary place:     What do you see -- especially colors?     What sounds do you hear?     What sensations do you feel?     What smells do you smell?     What people or animals would you want in your safe place?     Imagine a protective bubble, wall or boundary around your safe place.     Imagine a door or gate with a guard at your safe place.     Image a lock and key to your safe place and only you can unlock it.    You can draw or make a collage that represents your safe place.     Choose a souvenir of your safe place -- a color, an object, a song.     Keep your image of your safe place so you can come back to it when you need to.    Things that I am able to do with others to cope or help me feel better: reach out to therapist and other mental health providers, reach out to family members and family friends, identify what makes life worth living; distracting strategies, reach out to crisis lines    Things I can use or do for distraction:     Sing in the shower; take a long walk; count your blessings; read a poem; think about your pet; take a deep breath; let out a big sigh; Buy yourself flowers; Jump rope; Make a  to do  list; Count to 10; Keep a diary; Journal daily; Plant flowers; Do a good deed; Set flexible deadlines; Call a friend; Listen hard; Believe in yourself; Read a good book; See a live play; Write to a friend; Forgive and forget; Eat by candlelight; Go to a ball game; Take a bubble bath; End a bad  "habit; Listen to music; Share what you have; Don t drink and drive; Be in the moment; Set realistic goals ; Eat right ; Hug a friend; Say something nice; Whistle a tune; Stretch a lot; Watch your weight; Walk instead of drive; Sleep late on weekends; Read the comics; Focus on your task; Don t take drugs; Make a gratitude list; Laugh a lot; Be kind to others; Cry when you can; Praise others; Play with your sibling; Set priorities; Reward yourself often; Massage tense muscles; Do the hard tasks first; Take a long bike ride; Smell a flower; Take a personal day; Draw a picture; Avoid negative people; Dance by yourself; Memorize a new song; Start a new hobby; Ask for a hug ; Think positive thoughts; Do volunteer work; Go to bed an hour early; Talk less; Don t procrastinate; Avoid the small stuff; Don t eat junk food; Start a support group; Call your grandparents; Meditate each day; Remember your successes; Get medical checkups; Turn off the noise; Clean up the clutter; Find the silver lining; Let others be themselves; Walk in the rain; Donate to da; Exercise 20 minutes a day; Start school work early; Set daily goals for yourself; Visualize a peaceful scene; Schedule play time each day; Give the benefit of the doubt  ; See problems as opportunities; Budget your time and money; Make a duplicate set of keys; Break big jobs into small tasks; Teach someone something new; Emphasize your strengths ; Admit you don t know it all; Hum your favorite tune; Take care of your own needs; Remember feelings are not facts; Remember:  this too shall pass;\" Drink a glass of water before bedtime; Change your route to work/school; Develop alternative plans; Avoid seeing, listening to; Reading bad news    Changes I can make to support my mental health and wellness: attend all scheduled mental health appointments; get enough/good sleep; take medications as prescribed; eat a healthy diet; get enough exercise; identify consistent relaxation " "strategies; develop a routine    People in my life that I can ask for help: outpatient treatment team; family; friends; crisis lines    Crisis Lines    Your county has a mental health crisis team you can call 24/7: [facility] Mobile Crisis  [phone] (adults)  [phone] (children)     Crisis Text Line  Text 673479  You will be connected with a trained live crisis counselor to provide support.  Por espanol, texto  SIRENA a 969759 o texto a 442-AYUDAME en WhatsApp  National Hope Line  1.800.SUICIDE [4525974]  Crisis Intervention: 663.393.6771 or 513-012-0245 (TTY: 236.302.5469).  Call anytime for help.  Suicide Awareness Voices of Education (SAVE) (www.save.org): 888-511-SAVE (7283)  Text 4 Life: txt \"LIFE\" to 53606 for immediate support and crisis intervention    Community Resources    Fast Tracker  Linking people to mental health and substance use disorder resources  [employer].org   [place] Mental Health Warm Line  Peer to peer support  Monday thru Saturday, 12 pm to 10 pm  107.992.6917 or 2.422.393.8766  Text \"Support\" to 20943  National Holly on Mental Illness (MAGY)  142.921.3312 or 1.888.MAGY.HELPS    Mental Health Apps    My3  https://myArte Manifiestopp.org/  VirtualHopeBox  https://[employer].org/apps/virtual-hope-box/    Additional Information  Today you were seen by a licensed mental health professional through Triage and Transition services, [facility] ([facility])  for a crisis assessment in the Emergency Department at [facility].  It is recommended that you follow up with your established providers (psychiatrist, mental health therapist, and/or primary care doctor - as relevant) as soon as possible. Coordinators from [facility] will be calling you in the next 24-48 hours to ensure that you have the resources you need.  You can also contact [facility] coordinators directly at [phone]. You may have been scheduled for or offered an appointment with a mental health provider. " [facility] maintains an extensive network of licensed behavioral health providers to connect patients with the services they need.  We do not charge providers a fee to participate in our referral network.  We match patients with providers based on a patient's specific needs, insurance coverage, and location.  Our first effort will be to refer you to a provider within your care system, and will utilize providers outside your care system as needed.     Continue Regimen: SA acid compound to lesion daily Detail Level: Zone

## 2023-10-24 ENCOUNTER — HOSPITAL ENCOUNTER (EMERGENCY)
Facility: CLINIC | Age: 23
Discharge: HOME | End: 2023-10-24
Attending: FAMILY MEDICINE | Admitting: FAMILY MEDICINE
Payer: MEDICARE

## 2023-10-24 VITALS
DIASTOLIC BLOOD PRESSURE: 84 MMHG | SYSTOLIC BLOOD PRESSURE: 140 MMHG | OXYGEN SATURATION: 99 % | RESPIRATION RATE: 16 BRPM | HEART RATE: 88 BPM | TEMPERATURE: 98.4 F

## 2023-10-24 DIAGNOSIS — F79 INTELLECTUAL DISABILITY: Chronic | ICD-10-CM

## 2023-10-24 DIAGNOSIS — F60.3 BORDERLINE PERSONALITY DISORDER (H): Chronic | ICD-10-CM

## 2023-10-24 DIAGNOSIS — F31.32 BIPOLAR AFFECTIVE DISORDER, CURRENTLY DEPRESSED, MODERATE (H): ICD-10-CM

## 2023-10-24 PROCEDURE — 99285 EMERGENCY DEPT VISIT HI MDM: CPT | Performed by: FAMILY MEDICINE

## 2023-10-24 PROCEDURE — 250N000013 HC RX MED GY IP 250 OP 250 PS 637: Performed by: FAMILY MEDICINE

## 2023-10-24 PROCEDURE — 99283 EMERGENCY DEPT VISIT LOW MDM: CPT | Performed by: FAMILY MEDICINE

## 2023-10-24 RX ORDER — OLANZAPINE 10 MG/1
10 TABLET, ORALLY DISINTEGRATING ORAL ONCE
Status: COMPLETED | OUTPATIENT
Start: 2023-10-24 | End: 2023-10-24

## 2023-10-24 RX ADMIN — OLANZAPINE 10 MG: 10 TABLET, ORALLY DISINTEGRATING ORAL at 16:35

## 2023-10-24 NOTE — ED TRIAGE NOTES
Patient told staff at clinic she was going to bite herself to death.      Triage Assessment (Adult)       Row Name 10/24/23 9003          Triage Assessment    Airway WDL WDL        Respiratory WDL    Respiratory WDL WDL        Skin Circulation/Temperature WDL    Skin Circulation/Temperature WDL WDL        Cardiac WDL    Cardiac WDL WDL        Peripheral/Neurovascular WDL    Peripheral Neurovascular WDL WDL        Cognitive/Neuro/Behavioral WDL    Cognitive/Neuro/Behavioral WDL unchanged from my previous assessment;WDL

## 2023-10-24 NOTE — ED NOTES
Bed: ED14  Expected date: 10/24/23  Expected time: 3:32 PM  Means of arrival:   Comments:  N 720   TO TRIAGE  24 F  SI  Yellow

## 2023-10-25 ENCOUNTER — HOSPITAL ENCOUNTER (EMERGENCY)
Facility: CLINIC | Age: 23
Discharge: HOME | End: 2023-10-25
Attending: FAMILY MEDICINE | Admitting: FAMILY MEDICINE
Payer: MEDICARE

## 2023-10-25 VITALS
WEIGHT: 253 LBS | RESPIRATION RATE: 16 BRPM | SYSTOLIC BLOOD PRESSURE: 115 MMHG | HEART RATE: 93 BPM | DIASTOLIC BLOOD PRESSURE: 77 MMHG | BODY MASS INDEX: 44.82 KG/M2 | OXYGEN SATURATION: 98 % | TEMPERATURE: 98 F

## 2023-10-25 DIAGNOSIS — R06.02 SOB (SHORTNESS OF BREATH): ICD-10-CM

## 2023-10-25 DIAGNOSIS — F41.9 ANXIETY: ICD-10-CM

## 2023-10-25 PROCEDURE — 93005 ELECTROCARDIOGRAM TRACING: CPT | Performed by: FAMILY MEDICINE

## 2023-10-25 PROCEDURE — 93010 ELECTROCARDIOGRAM REPORT: CPT | Performed by: FAMILY MEDICINE

## 2023-10-25 PROCEDURE — 250N000013 HC RX MED GY IP 250 OP 250 PS 637: Performed by: FAMILY MEDICINE

## 2023-10-25 PROCEDURE — 99284 EMERGENCY DEPT VISIT MOD MDM: CPT | Mod: 25 | Performed by: FAMILY MEDICINE

## 2023-10-25 PROCEDURE — 99283 EMERGENCY DEPT VISIT LOW MDM: CPT | Performed by: FAMILY MEDICINE

## 2023-10-25 RX ORDER — OLANZAPINE 10 MG/1
10 TABLET, ORALLY DISINTEGRATING ORAL ONCE
Status: COMPLETED | OUTPATIENT
Start: 2023-10-25 | End: 2023-10-25

## 2023-10-25 RX ADMIN — OLANZAPINE 10 MG: 10 TABLET, ORALLY DISINTEGRATING ORAL at 20:54

## 2023-10-25 ASSESSMENT — ENCOUNTER SYMPTOMS: SHORTNESS OF BREATH: 1

## 2023-10-25 ASSESSMENT — ACTIVITIES OF DAILY LIVING (ADL): ADLS_ACUITY_SCORE: 35

## 2023-10-25 NOTE — ED PROVIDER NOTES
"ED Provider Note  [facility]      History     Chief Complaint   Patient presents with    Suicidal     Pt was at clinic appt today and told provider she wanted to bite herself to death. Pt was placed on transport hold.     HPI  Sadaf Ross is a 23 year old female who presents emergency room after having increased agitation at the group home she had made statements that she wanted to \"fight herself\" however on arrival here in the emergency room patient states that she does feel like as though she is at baseline and felt like there was an \"overreaction\" from staff and is requesting discharge back to the [facility].  Patient is once again feeling as though she is de-escalating and safe she had been offered 10 mg Zyprexa and took this as a as needed prior to her requesting discharge.    Past Medical History  Past Medical History:   Diagnosis Date    ADHD (attention deficit hyperactivity disorder)     Bipolar 1 disorder (H)     Borderline personality disorder (H)     Depression     Depressive disorder     Intellectual disability     Obesity     Syncope      Past Surgical History:   Procedure Laterality Date    APPENDECTOMY      APPENDECTOMY       acetaminophen (TYLENOL) 325 MG tablet  albuterol (PROAIR HFA/PROVENTIL HFA/VENTOLIN HFA) 108 (90 Base) MCG/ACT inhaler  ARIPiprazole lauroxil ER (ARISTADA) 882 MG/3.2ML intra-muscular  bisacodyl (DULCOLAX) 5 MG EC tablet  calcium carbonate (TUMS) 500 MG chewable tablet  cyclobenzaprine (FLEXERIL) 10 MG tablet  dicyclomine (BENTYL) 20 MG tablet  docusate sodium (COLACE) 50 MG capsule  famotidine (PEPCID) 20 MG tablet  FLUoxetine (PROZAC) 40 MG capsule  hydrocortisone, Perianal, (HYDROCORTISONE) 2.5 % cream  hydrOXYzine (ATARAX) 50 MG tablet  LORazepam (ATIVAN) 0.5 MG tablet  mupirocin (BACTROBAN) 2 % external ointment  OLANZapine zydis (ZYPREXA) 5 MG ODT  ondansetron (ZOFRAN) 4 MG tablet  ondansetron (ZOFRAN) 4 MG tablet  pantoprazole (PROTONIX) " 40 MG EC tablet  polyethylene glycol (MIRALAX) 17 GM/Dose powder  promethazine (PHENERGAN) 12.5 MG tablet  sennosides (SENOKOT) 8.6 MG tablet  traZODone (DESYREL) 50 MG tablet  Vitamin D, Cholecalciferol, 25 MCG (1000 UT) TABS      Allergies   Allergen Reactions    Penicillins Rash and Unknown     Family History  Family History   Problem Relation Age of Onset    Diabetes Type 1 Father     Cancer Paternal Grandfather      Social History   Social History     Tobacco Use    Smoking status: Every Day     Packs/day: 3.00     Years: 5.00     Additional pack years: 0.00     Total pack years: 15.00     Types: Cigarettes    Smokeless tobacco: Never   Substance Use Topics    Alcohol use: No    Drug use: No         A medically appropriate review of systems was performed with pertinent positives and negatives noted in the HPI, and all other systems negative.    Physical Exam   BP: (!) 140/84  Pulse: 88  Temp: 98.4  F (36.9  C)  Resp: 16  SpO2: 99 %  Physical Exam  Constitutional:       General: She is not in acute distress.     Appearance: Normal appearance. She is not diaphoretic.   HENT:      Head: Atraumatic.      Mouth/Throat:      Mouth: Mucous membranes are moist.   Eyes:      General: No scleral icterus.     Conjunctiva/sclera: Conjunctivae normal.   Cardiovascular:      Rate and Rhythm: Normal rate.      Heart sounds: Normal heart sounds.   Pulmonary:      Effort: No respiratory distress.      Breath sounds: Normal breath sounds.   Abdominal:      General: Abdomen is flat.   Musculoskeletal:      Cervical back: Neck supple.   Skin:     General: Skin is warm.      Findings: No rash.   Neurological:      General: No focal deficit present.      Mental Status: She is alert and oriented to person, place, and time.      Sensory: No sensory deficit.      Motor: No weakness.      Coordination: Coordination normal.   Psychiatric:         Mood and Affect: Mood is anxious.         Speech: Speech normal.         Behavior: Behavior  is cooperative.         Thought Content: Thought content does not include homicidal or suicidal ideation.           ED Course, Procedures, & Data      Procedures      Suicide assessment completed by mental health (D.E.C., LCSW, etc.)       No results found for any visits on 10/24/23.  Medications   OLANZapine zydis (zyPREXA) ODT tab 10 mg (10 mg Oral $Given 10/24/23 1635)     Labs Ordered and Resulted from Time of ED Arrival to Time of ED Departure - No data to display  No orders to display          Critical care was not performed.     Medical Decision Making  The patient's presentation was of high complexity (a chronic illness severe exacerbation, progression, or side effect of treatment).    The patient's evaluation involved:  ordering and/or review of 3+ test(s) in this encounter ( )  discussion of management or test interpretation with another health professional (see separate area of note for details)    The patient's management necessitated high risk (a decision regarding hospitalization).    Assessment & Plan        I have reviewed the nursing notes. I have reviewed the findings, diagnosis, plan and need for follow up with the patient.    Patient at baseline will be returning back to her group home.    Final diagnoses:   Borderline personality disorder (H)   Intellectual disability   Bipolar affective disorder, currently depressed, moderate (H)         [facility] EMERGENCY DEPARTMENT  10/24/2023     Robert Olea MD  10/24/23 2015

## 2023-10-26 ENCOUNTER — NURSE TRIAGE (OUTPATIENT)
Dept: NURSING | Facility: CLINIC | Age: 23
End: 2023-10-26

## 2023-10-26 ENCOUNTER — NURSE TRIAGE (OUTPATIENT)
Dept: NURSING | Facility: CLINIC | Age: 23
End: 2023-10-26
Payer: MEDICARE

## 2023-10-26 LAB
ATRIAL RATE - MUSE: 89 BPM
DIASTOLIC BLOOD PRESSURE - MUSE: NORMAL MMHG
INTERPRETATION ECG - MUSE: NORMAL
P AXIS - MUSE: 44 DEGREES
PR INTERVAL - MUSE: 180 MS
QRS DURATION - MUSE: 86 MS
QT - MUSE: 364 MS
QTC - MUSE: 442 MS
R AXIS - MUSE: 17 DEGREES
SYSTOLIC BLOOD PRESSURE - MUSE: NORMAL MMHG
T AXIS - MUSE: 15 DEGREES
VENTRICULAR RATE- MUSE: 89 BPM

## 2023-10-26 NOTE — TELEPHONE ENCOUNTER
"Pt reports she lives in a group home.  Reports shortness of breath.  Pt states \"I feel like I'm in a room full of heat.\"  \"Feels like I'm suffocating right now.\"  Pt exhibits ability to speak full sentences, strong voice.    Pt appears to have historical Rx for lorazepam.  Rx notes as follows:  Take 0.5 mg by mouth once as needed for anxiety Give as needed any time she has the urge to call 911 or to visit the ER, Historical     Requested to speak with pt's group home staff ...  Staffer \"Peyton\" comes to phone.  Peyton reports pt's temp 96.8 (temporal scanner).  Pt states \"It's always hot in the house.\"  \"Sometimes I feel cold, but mostly I'm hot.\"  \"Just went to [facility].\"  \"They recommended me to get seen at the hospital.\"    Pt reports upcoming appt Nov 13th with PCP at [facility].  Discussed with pt that she has already been seen five times within the past six days for anxiety, panic, shortness of breath and suicidal ideation.  No new meds.    Apparent behavioral issues.  Reports headache for 2-to-3 days.  \"My anxiety is high.\"  \"Feel like falling.\"  \"I'm hot.\"  Pt exhibits ability to speak full sentences.  Discussed opening a window, removing sweater.    Brii, additional group home staffer now comes to phone.  States \"She's now passed out.\"  \"Sitting on the rocking chair passed out.\"  Apparently unresponsive.    Therefore advised calling 911.  Staffer (Brii) agrees to plan.  Reached dispatcher.  Ambulance is on the way.    Laina [facility] Nurse Advisor     Reason for Disposition   Passed out (i.e., fainted, collapsed and was not responding)    Additional Information   Negative: SEVERE difficulty breathing (e.g., struggling for each breath, speaks in single words, pulse > 120)   Negative: Breathing stopped and hasn't returned   Negative: Choking on something   Negative: Bluish (or gray) lips or face   Negative: Difficult to awaken or acting confused (e.g., disoriented, slurred " speech)    Protocols used: Breathing Difficulty-A-OH

## 2023-10-26 NOTE — ED PROVIDER NOTES
ED Provider Note  [facility]      History     Chief Complaint   Patient presents with    Shortness of Breath     BIBA for SOB. EMS reports pt told them that she was Urgent Care for lightheaded and SOB around 4:20 PM, got back to , it was hot there and had a panic attack-SOB.       Shortness of Breath    Sadaf Ross is a 23 year old female who presents to the ED BIBA from  for SOB. She reports panic attack-like symptoms.    She was seen earlier today for same symptoms and had an XR done.    XR Chest 2 Views 10/25/2023   IMPRESSION  COMPARISON: 06/08/2022.  FINDINGS: Normal cardiomediastinal silhouette and pulmonary vasculature. Lungs appear clear. No pneumothorax or pleural effusion. Bony thorax is unremarkable.     HX of bipolar affective, BPD, intellectual disability, anxiety, SI and SIB,     She does have a Care Plan in place d/t disruptive behavior in the ED.    When patient was seen earlier today in urgent care they also gave her nebulizer treatment which may have in fact an elevated her pulse which made her more anxious thus arriving here in the emergency room.    Past Medical History  Past Medical History:   Diagnosis Date    ADHD (attention deficit hyperactivity disorder)     Bipolar 1 disorder (H)     Borderline personality disorder (H)     Depression     Depressive disorder     Intellectual disability     Obesity     Syncope      Past Surgical History:   Procedure Laterality Date    APPENDECTOMY      APPENDECTOMY       acetaminophen (TYLENOL) 325 MG tablet  albuterol (PROAIR HFA/PROVENTIL HFA/VENTOLIN HFA) 108 (90 Base) MCG/ACT inhaler  ARIPiprazole lauroxil ER (ARISTADA) 882 MG/3.2ML intra-muscular  bisacodyl (DULCOLAX) 5 MG EC tablet  calcium carbonate (TUMS) 500 MG chewable tablet  cyclobenzaprine (FLEXERIL) 10 MG tablet  dicyclomine (BENTYL) 20 MG tablet  docusate sodium (COLACE) 50 MG capsule  famotidine (PEPCID) 20 MG tablet  FLUoxetine (PROZAC) 40 MG  capsule  hydrocortisone, Perianal, (HYDROCORTISONE) 2.5 % cream  hydrOXYzine (ATARAX) 50 MG tablet  LORazepam (ATIVAN) 0.5 MG tablet  mupirocin (BACTROBAN) 2 % external ointment  OLANZapine zydis (ZYPREXA) 5 MG ODT  ondansetron (ZOFRAN) 4 MG tablet  ondansetron (ZOFRAN) 4 MG tablet  pantoprazole (PROTONIX) 40 MG EC tablet  polyethylene glycol (MIRALAX) 17 GM/Dose powder  promethazine (PHENERGAN) 12.5 MG tablet  sennosides (SENOKOT) 8.6 MG tablet  traZODone (DESYREL) 50 MG tablet  Vitamin D, Cholecalciferol, 25 MCG (1000 UT) TABS      Allergies   Allergen Reactions    Penicillins Rash and Unknown     Family History  Family History   Problem Relation Age of Onset    Diabetes Type 1 Father     Cancer Paternal Grandfather      Social History   Social History     Tobacco Use    Smoking status: Every Day     Packs/day: 3.00     Years: 5.00     Additional pack years: 0.00     Total pack years: 15.00     Types: Cigarettes    Smokeless tobacco: Never   Substance Use Topics    Alcohol use: No    Drug use: No      Past medical history, past surgical history, medications, allergies, family history, and social history were reviewed with the patient. No additional pertinent items.      A complete review of systems was performed with pertinent positives and negatives noted in the HPI, and all other systems negative.    Physical Exam   BP: 106/74  Pulse: 102  Temp: 99  F (37.2  C)  Resp: 16  Weight: 114.8 kg (253 lb)  SpO2: 96 %  Physical Exam  Constitutional:       General: She is not in acute distress.     Appearance: Normal appearance. She is not diaphoretic.   HENT:      Head: Atraumatic.      Mouth/Throat:      Mouth: Mucous membranes are moist.   Eyes:      General: No scleral icterus.     Conjunctiva/sclera: Conjunctivae normal.   Cardiovascular:      Rate and Rhythm: Normal rate.      Heart sounds: Normal heart sounds.   Pulmonary:      Effort: No respiratory distress.      Breath sounds: Normal breath sounds.   Abdominal:       General: Abdomen is flat.   Musculoskeletal:      Cervical back: Neck supple.   Skin:     General: Skin is warm.      Findings: No rash.   Neurological:      Mental Status: She is alert.         ED Course, Procedures, & Data      Procedures       Results for orders placed or performed during the hospital encounter of 10/25/23   EKG 12 lead     Status: None   Result Value Ref Range    Systolic Blood Pressure  mmHg    Diastolic Blood Pressure  mmHg    Ventricular Rate 89 BPM    Atrial Rate 89 BPM    RI Interval 180 ms    QRS Duration 86 ms     ms    QTc 442 ms    P Axis 44 degrees    R AXIS 17 degrees    T Axis 15 degrees    Interpretation ECG       Sinus rhythm  Possible Anterior infarct , age undetermined  Abnormal ECG  Unconfirmed report - interpretation of this ECG is computer generated - see medical record for final interpretation  Confirmed by - EMERGENCY ROOM, PHYSICIAN ([id number]),  KRISTIE POWELL ([id number]) on 10/26/2023 3:10:47 PM       Medications   OLANZapine zydis (zyPREXA) ODT tab 10 mg (10 mg Oral $Given 10/25/23 2054)     Labs Ordered and Resulted from Time of ED Arrival to Time of ED Departure - No data to display  No orders to display          Critical care was not performed.     Medical Decision Making  The patient's presentation was of moderate complexity (a chronic illness mild to moderate exacerbation, progression, or side effect of treatment).    The patient's evaluation involved:  history and exam without other MDM data elements    The patient's management necessitated only low risk treatment.    Assessment & Plan        I have reviewed the nursing notes. I have reviewed the findings, diagnosis, plan and need for follow up with the patient.    Patient with history of underlying anxiety now with increased shortness of breath likely secondary to her increase in anxiety after having had a nebulizer at the urgent care if she became more anxious which increased her symptoms now resolved at  baseline and will be discharged back to group home.    Final diagnoses:   Anxiety   SOB (shortness of breath)   Justin MCGINNIS, am serving as a trained medical scribe to document services personally performed by Robert Olea MD based on the provider's statements to me on October 25, 2023.  This document has been checked and approved by the attending provider.    Robert MCGINNIS MD, was physically present and have reviewed and verified the accuracy of this note documented by Justin Pandey medical scribe.      Robert Olea MD  [facility] EMERGENCY DEPARTMENT  10/25/2023     Robert Olea MD  10/27/23 3694

## 2023-10-26 NOTE — TELEPHONE ENCOUNTER
Nurse Triage SBAR    Is this a 2nd Level Triage?  Yes    Situation:  short of breath, some coughing, hot sweats, chills    Background/Assessment:     Pt reporting, feeling short of breath for the last few days.    Some coughing and wheezing once in a while.   Hot sweats, cold sweats and some chills  Feels exhausted, like she could sleep all day.    I suggested Urgent Care or making an appointment with her Primary Care clinic at the [facility] in [place], [place] for Pt care.    Pt going to Urgent Care or her Primary care Provider.   No further action needed.    Mari Meza RN  Central Triage Red Flags/Med Refills        Protocol Recommended Disposition:   Go To Office Now      Reason for Disposition   Patient wants to be seen    Additional Information   Negative: SEVERE difficulty breathing (e.g., struggling for each breath, speaks in single words, pulse > 120)   Negative: Breathing stopped and hasn't returned   Negative: Choking on something   Negative: Bluish (or gray) lips or face   Negative: Difficult to awaken or acting confused (e.g., disoriented, slurred speech)   Negative: Passed out (i.e., fainted, collapsed and was not responding)   Negative: Wheezing started suddenly after medicine, an allergic food, or bee sting   Negative: Stridor (harsh sound while breathing in)   Negative: Slow, shallow and weak breathing   Negative: Sounds like a life-threatening emergency to the triager   Negative: Chest pain   Negative: Wheezing (high pitched whistling sound) and previous asthma attacks or use of asthma medicines   Negative: Difficulty breathing and within 14 days of COVID-19 Exposure   Negative: Difficulty breathing and only present when coughing   Negative: Difficulty breathing and only from stuffy nose   Negative: Difficulty breathing and only from stuffy nose or runny nose from common cold   Negative: MODERATE difficulty breathing (e.g., speaks in phrases, SOB even at rest, pulse 100-120) of new-onset or worse  "than normal   Negative: Oxygen level (e.g., pulse oximetry) 90% or lower   Negative: Wheezing can be heard across the room   Negative: Drooling or spitting out saliva (because can't swallow)   Negative: Any history of prior \"blood clot\" in leg or lungs   Negative: Illness requiring prolonged bedrest in past month (e.g., immobilization, long hospital stay)   Negative: Hip or leg fracture (broken bone) in past month (or had cast on leg or ankle in past month)   Negative: Major surgery in the past month   Negative: Long-distance travel in past month (e.g., car, bus, train, plane; with trip lasting 6 or more hours)   Negative: Cancer treatment in past six months (or has cancer now)   Negative: Extra heartbeats, irregular heart beating, or heart is beating very fast (i.e., 'palpitations')   Negative: Fever > 103 F (39.4 C)   Negative: Fever > 101 F (38.3 C) and over 60 years of age   Negative: Fever > 100.0 F (37.8 C) and bedridden (e.g., nursing home patient, stroke, chronic illness, recovering from surgery)   Negative: Fever > 100.0 F (37.8 C) and diabetes mellitus or weak immune system (e.g., HIV positive, cancer chemo, splenectomy, organ transplant, chronic steroids)   Negative: Periods where breathing stops and then resumes normally and bedridden (e.g., nursing home patient, CVA)   Negative: Pregnant or postpartum (from 0 to 6 weeks after delivery)   Negative: Patient sounds very sick or weak to the triager   Negative: MILD difficulty breathing (e.g., minimal/no SOB at rest, SOB with walking, pulse < 100) of new-onset or worse than normal   Negative: Longstanding difficulty breathing (e.g., CHF, COPD, emphysema) and worse than normal   Negative: Longstanding difficulty breathing and not responding to usual therapy   Negative: Continuous (nonstop) coughing    Protocols used: Breathing Difficulty-A-OH    "

## 2023-10-27 ENCOUNTER — HOSPITAL ENCOUNTER (EMERGENCY)
Facility: CLINIC | Age: 23
Discharge: HOME | End: 2023-10-27
Attending: PSYCHIATRY & NEUROLOGY | Admitting: PSYCHIATRY & NEUROLOGY
Payer: MEDICARE

## 2023-10-27 VITALS
DIASTOLIC BLOOD PRESSURE: 82 MMHG | HEART RATE: 99 BPM | OXYGEN SATURATION: 98 % | RESPIRATION RATE: 16 BRPM | TEMPERATURE: 99.5 F | SYSTOLIC BLOOD PRESSURE: 123 MMHG

## 2023-10-27 DIAGNOSIS — F43.0 ACUTE REACTION TO SITUATIONAL STRESS: ICD-10-CM

## 2023-10-27 DIAGNOSIS — F79 INTELLECTUAL DISABILITY: ICD-10-CM

## 2023-10-27 PROCEDURE — 99285 EMERGENCY DEPT VISIT HI MDM: CPT | Performed by: PSYCHIATRY & NEUROLOGY

## 2023-10-27 PROCEDURE — 99284 EMERGENCY DEPT VISIT MOD MDM: CPT | Performed by: PSYCHIATRY & NEUROLOGY

## 2023-10-27 PROCEDURE — 250N000013 HC RX MED GY IP 250 OP 250 PS 637: Performed by: PSYCHIATRY & NEUROLOGY

## 2023-10-27 RX ORDER — OLANZAPINE 10 MG/1
10 TABLET, ORALLY DISINTEGRATING ORAL ONCE
Status: COMPLETED | OUTPATIENT
Start: 2023-10-27 | End: 2023-10-27

## 2023-10-27 RX ADMIN — OLANZAPINE 10 MG: 10 TABLET, ORALLY DISINTEGRATING ORAL at 16:02

## 2023-10-27 ASSESSMENT — ACTIVITIES OF DAILY LIVING (ADL): ADLS_ACUITY_SCORE: 37

## 2023-10-27 NOTE — DISCHARGE INSTRUCTIONS
Aftercare Plan    Please sign the Release of Information (DANDY) prior to leaving the hospital. By signing you are allowing us to share visit information with current providers and to make recommended referrals on your behalf.     Follow up with established providers and supports as scheduled. Continue taking medications as prescribed. Abstain from drugs and alcohol. Utilize your [facility] mental health crisis team as needed. They are available 24/7.     If I am feeling unsafe or I am in a crisis, I will:   Contact my established care providers   Call the [place] Suicide Prevention Lifeline: [phone]   Go to the nearest emergency room   Call [phone]     Warning signs that I or other people might notice when a crisis is developing for me: changes to sleep, appetite or mood, increased anger, agitation or irritability, feeling depressed or hopeless, spending more time alone or talking less, increased crying, decreased productivity, seeing or hearing things that aren't there, thoughts of not wanting to live anymore or of actually killing myself, thoughts of hurting others    Things I am able to do on my own to cope or help me feel better: watching a favorite tv show or movie, listening to music I enjoy, going outside and breathing fresh air, going for a walk or exercising, taking a shower or bath, a cold or hot beverage, a healthy snack, drawing/coloring/painting, journaling, singing or dancing, deep breathing     I can try practicing square breathing when I begin to feel anxious - inhale through the nose for the count of 4 and the first line on the square. Exhale through the mouth for the count of 4 for the second line of the square. Repeat to complete the square. Repeat the square as many times as needed.    I can also use my five senses to practice mindfulness and grounding. What are five things I can see, four things I can hear, three things I can feel, two things I can smell, and one thing I can taste.     I enjoy  listening to music, working on my crossword puzzles, and talking with others. These are things I can continue to do to help decrease symptoms.     Things that I am able to do with others to cope or help me feel better: sometimes just talking or spending time with someone else, sharing a meal or having coffee, watching a movie or playing a game, going for a walk or exercising    I can also use community resources including mental health hotlines, county crisis teams, or apps.     Things I can use or do for distraction: movies/tv, music, reading, games, drawing/coloring/painting or other art, essential oils, exercise, cleaning/organizing, puzzles, crossword puzzles, word search, Sudoku       I can also download a meditation or relaxation kervin, like Calm, Headspace, or Insight Timer (all three offer a free version)    Changes I can make to support my mental health and wellness: Attend scheduled mental health therapy and psychiatric appointments. Take my medications as prescribed. Maintain a daily schedule/routine. Abstain from all mood altering substances, including drugs, alcohol, or medications not currently prescribed to me. Implement a self-care routine.      People in my life that I can ask for help: friends or family, trusted teachers/staff/colleagues, trusted members of my community or place of [employer], mental health crisis lines, or 911    Your [facility] has a mental health crisis team you can call 24/7: [facility], [phone]    Other things that are important when I m in crisis: to remember that the feelings I am having right now are temporary, and it won't feel like this forever, and that it is okay and important to ask for help    Crisis Lines  Crisis Text Line  Text 391231  You will be connected with a trained live crisis counselor to provide support.    Por espanol, texto  SIRENA a 225981 o texto a 442-AYUDAME en WhatsApp    National Hope Line  1.800.SUICIDE [6159119]    Community  "Resources  Fast Tracker  Linking people to mental health and substance use disorder resources  fastMadhouse Mediackermn.org     [place] Mental Health Warm Line  Peer to peer support  Monday thru Saturday, 12 pm to 10 pm  661.535.1518 or 0.441.336.7673  Text \"Support\" to 38692    National [place] [employer] Mental Illness (MAGY)  750.929.8398 or 1.888.MAGY.HELPS    [facility] [place]: 343-338-8234    Mental Health Apps  My3  https://Loop Survey.org/    VirtualHopeBox  https://Betterfly/apps/virtual-hope-box/      Additional Information  Today you were seen by a licensed mental health professional through Triage and Transition services, Behavioral Healthcare Providers ([facility])  for a crisis assessment in the Emergency Department at [facility].  It is recommended that you follow up with your established providers (psychiatrist, mental health therapist, and/or primary care doctor - as relevant) as soon as possible. Coordinators from [facility] will be calling you in the next 24-48 hours to ensure that you have the resources you need.  You can also contact [facility] coordinators directly at [phone]. You may have been scheduled for or offered an appointment with a mental health provider. [facility] maintains an extensive network of licensed behavioral health providers to connect patients with the services they need.  We do not charge providers a fee to participate in our referral network.  We match patients with providers based on a patient's specific needs, insurance coverage, and location.  Our first effort will be to refer you to a provider within your care system, and will utilize providers outside your care system as needed.       "

## 2023-10-27 NOTE — CONSULTS
Diagnostic Evaluation Consultation  Crisis Assessment    Patient Name: Sadaf Ross  Age:  23 year old  Legal Sex: female  Gender Identity: female  Pronouns: unknown  Race: White  Ethnicity: Not  or   Language: English      Patient was assessed: In person      Patient location: [facility] EMERGENCY DEPARTMENT                               Referral Data and Chief Complaint  Sadaf Ross presents to the ED by  self (Patient took a medical cab to the ED.). Patient is presenting to the ED for the following concerns: Depression, Suicidal ideation.   Factors that make the mental health crisis life threatening or complex are:  Patient reported she got upset with the group home this morning and became upset, and this lead to her feeling dysregulated and suicidal. Patient has a history of feeling suicidal when she is feeling depressed, and/or frustrated. She mentioned different ways she can kill herself (overdosing, banging her head against the wall, stabbing herself with a pen). Once writer redirected patient, her mood improved, and she did not appear agitated. She presented as calm and discussed coping skills with writer..    Informed Consent and Assessment Methods  Explained the crisis assessment process, including applicable information disclosures and limits to confidentiality, assessed understanding of the process, and obtained consent to proceed with the assessment.  Assessment methods included conducting a formal interview with patient, review of medical records, collaboration with medical staff, and obtaining relevant collateral information from family and community providers when available.  : done     Patient response to interventions: acceptance expressed  Coping skills were attempted to reduce the crisis:  Listening to music, completing cross word puzzles, and calling cope. She also goes to the ED.     History of the Crisis   Patient's baseline includes chronic SI. She has a  history of coming to the ED when she feels emotionally dysregulated. She lives in a group home with 24/7 supervision and care, has outpatient providers (PCP, psychiatry, therapist), and individual community resources - PSS and PSA (similar to [facility] workers).    Brief Psychosocial History  Family:  Single, Children no  Support System:  PCA, Other (specify) (staff at the group home, providers.)  Employment Status:  disabled  Source of Income:  disability  Financial Environmental Concerns:  none  Current Hobbies:  music, puzzles  Barriers in Personal Life:  cognitive limitations, mental health concerns    Significant Clinical History  Current Anxiety Symptoms:  anxious, excessive worry  Current Depression/Trauma:  hopelessness, sadness, thoughts of death/suicide  Current Somatic Symptoms:  anxious  Current Psychosis/Thought Disturbance:  impulsive, displaces blame  Current Eating Symptoms:   (appetite is normal)  Chemical Use History:  Alcohol: None  Benzodiazepines: None  Opiates: None  Cocaine: None  Marijuana: None  Other Use: None  Withdrawal Symptoms:  (Not applicable)  Addictions:  (none)   Past diagnosis:  ADHD, Anxiety Disorder, Bipolar Disorder, Depression, Personality Disorder, Suicide attempt(s)  Family history:  Anxiety Disorder, Depression  Past treatment:  Individual therapy, Case management, Psychiatric Medication Management, Supportive Living Environment (group home, senior living house, etc), Inpatient Hospital[facility], [facility]  Details of most recent treatment:  Patient has numerous emergency room visits. Per chart review - patient has gone to the emergency room 14 times within the month of October. Patient last saw her therapist approximately one month ago, and individual in-home workers she sees weekly. Staff in the group home help patient daily.  Other relevant history:       Collateral Information  Is there collateral information: Yes     Collateral information name, relationship, phone number:   "[facility] - Group Home Staff [phone]    What happened today: Patient is at her baseline, \"everything is the same.\" No new medication changes. \"She is her normal self.\"     What is different about patient's functioning: Patient's functioning is the same. Group home staff believe it's the weather that is triggering patient to come to the ED numerous times.     Concern about alcohol/drug use:      What do you think the patient needs:      Has patient made comments about wanting to kill themselves/others: no    If d/c is recommended, can they take part in safety/aftercare planning:  yes    Additional collateral information:  none     Risk Assessment    Mason Suicide Severity Rating Scale Recent:   Suicidal Ideation (Recent)  Q1 Wished to be Dead (Past Month): yes  Q2 Suicidal Thoughts (Past Month): yes  Q3 Suicidal Thought Method: yes  Q4 Suicidal Intent without Specific Plan: no  Q5 Suicide Intent with Specific Plan: no  Level of Risk per Screen: moderate risk  Intensity of Ideation (Recent)  Most Severe Ideation Rating (Past 1 Month): 4  Frequency (Past 1 Month): 2-5 times in week  Duration (Past 1 Month): Fleeting, few seconds or minutes  Controllability (Past 1 Month): Can control thoughts with some difficulty  Deterrents (Past 1 Month): Deterrents definitely stopped you from attempting suicide  Reasons for Ideation (Past 1 Month): Equally to get attention, revenge, or a reaction from others and to end/stop the pain  Suicidal Behavior (Recent)  Actual Attempt (Past 3 Months): Yes  Total Number of Actual Attempts (Past 3 Months): 1  Actual Attempt Description (Past 3 Months): Patient used her car keys and cut her wrists.  Has subject engaged in non-suicidal self-injurious behavior? (Past 3 Months): Yes  Interrupted Attempts (Past 3 Months): No  Total Number of Interrupted Attempts (Past 3 Months): 1  Aborted or Self-Interrupted Attempt (Past 3 Months): Yes  Total Number of Aborted or Self-Interrupted Attempts " (Past 3 Months): 1  Preparatory Acts or Behavior (Past 3 Months): No  Total Number of Preparatory Acts (Past 3 Months): 0    Environmental or Psychosocial Events: loss of a loved one, helplessness/hopelessness, impulsivity/recklessness  Protective Factors: Protective Factors: strong bond to family unit, community support, or employment, responsibilities and duties to others, including pets and children, lives in a responsibly safe and stable environment, help seeking, able to access care without barriers    Does the patient have thoughts of harming others? Feels Like Hurting Others: no  Previous Attempt to Hurt Others: no  Current presentation:  (Not applicable. Patient presents irritable, and then became calm and cooperative during the session.)  Is the patient engaging in sexually inappropriate behavior?: no    Is the patient engaging in sexually inappropriate behavior?  no        Mental Status Exam   Affect: Appropriate  Appearance: Appropriate  Attention Span/Concentration: Attentive  Eye Contact: Engaged    Fund of Knowledge: Appropriate   Language /Speech Content: Fluent  Language /Speech Volume: Normal  Language /Speech Rate/Productions: Normal  Recent Memory: Intact  Remote Memory: Intact  Mood: Anxious, Sad  Orientation to Person: Yes   Orientation to Place: Yes  Orientation to Time of Day: Yes  Orientation to Date: Yes     Situation (Do they understand why they are here?): Yes  Psychomotor Behavior: Normal  Thought Content: Suicidal  Thought Form: Intact     Medication  No current facility-administered medications for this encounter.     Current Outpatient Medications   Medication    acetaminophen (TYLENOL) 325 MG tablet    albuterol (PROAIR HFA/PROVENTIL HFA/VENTOLIN HFA) 108 (90 Base) MCG/ACT inhaler    ARIPiprazole lauroxil ER (ARISTADA) 882 MG/3.2ML intra-muscular    bisacodyl (DULCOLAX) 5 MG EC tablet    calcium carbonate (TUMS) 500 MG chewable tablet    cyclobenzaprine (FLEXERIL) 10 MG tablet     dicyclomine (BENTYL) 20 MG tablet    docusate sodium (COLACE) 50 MG capsule    famotidine (PEPCID) 20 MG tablet    FLUoxetine (PROZAC) 40 MG capsule    hydrocortisone, Perianal, (HYDROCORTISONE) 2.5 % cream    hydrOXYzine (ATARAX) 50 MG tablet    LORazepam (ATIVAN) 0.5 MG tablet    mupirocin (BACTROBAN) 2 % external ointment    OLANZapine zydis (ZYPREXA) 5 MG ODT    ondansetron (ZOFRAN) 4 MG tablet    ondansetron (ZOFRAN) 4 MG tablet    pantoprazole (PROTONIX) 40 MG EC tablet    polyethylene glycol (MIRALAX) 17 GM/Dose powder    promethazine (PHENERGAN) 12.5 MG tablet    sennosides (SENOKOT) 8.6 MG tablet    traZODone (DESYREL) 50 MG tablet    Vitamin D, Cholecalciferol, 25 MCG (1000 UT) TABS       Psychotropic medications:   Medication Orders - Psychiatric (From admission, onward)      None             Current Care Team  Patient Care Team:  [facility] as PCP - General (Clinic)  Chloe Alva APRN CNP as Nurse Practitioner (OB/Gyn)  Harley Esparza CNM as Assigned OBGYN Provider  Seble Jones PA-C as Physician Assistant    Diagnosis  Patient Active Problem List   Diagnosis Code    MENTAL HEALTH     Suicidal ideation R45.851    Suicidal ideations R45.851    Intellectual disability F79    Bipolar affective disorder, currently depressed, moderate (H) F31.32    Borderline personality disorder (H) F60.3    Morbid obesity (H) E66.01       Primary Problem This Admission  Bipolar Affective Disorder, currently depressed, moderate  F31.32    Clinical Summary and Substantiation of Recommendations   Patient's baseline symptoms include chronic SI. She reports feeling frustrated and upset this morning when she interacted with the staff at the group home, and this triggered her SI. Patient has 14 ED visits within the month of October. She is medication compliant, has 24/7 supervision at the group home, has outpatient psychiatry and therapy, two in-home workers (similar to [facility]), has help seeking  behaviors - she reports calling [facility] this morning, and she comes to the ED when she is emotionally dysregulated. Based upon these protective factors, writer recommends discharge. Patient appeared calm and cooperative when writer and patient discussed safety planning and coping skills. Writer spoke with the staff at the group home ([facility]), and notified them of the reported pills in patient's backpack. Writer recommended the staff remove the pills and give them to her individually. Writer and patient reviewed safety planning.    Patient coping skills attempted to reduce the crisis:  Listening to music, completing cross word puzzles, and calling cope. She also goes to the ED.    Disposition  Recommended disposition: Group Home        Reviewed case and recommendations with attending provider. Attending Name: Dr. Nathen MD       Attending concurs with disposition: yes       Patient and/or validated legal guardian concurs with disposition:   yes       Final disposition:  discharge    Legal status on admission:      Assessment Details   Total duration spent with the patient: 40 min     CPT code(s) utilized: 74987 - Psychotherapy for Crisis - 60 (30-74*) min    MEL Ruiz, Psychotherapist  [facility] - Triage & Transition Services  Callback: [phone]

## 2023-10-27 NOTE — ED PROVIDER NOTES
ED Provider Note  [facility]      History     Chief Complaint   Patient presents with    Suicidal     Suicidal thoughts because she feels no one cares about her, went to her clinic who told her to come here, wants to bite herself to death     HPI  Sadaf Ross is a 23 year old female who is here as she got frustrated from her clinic visit where she tried to get a same-day appointment to see a therapist. Patient was told that she will have a scheduled appointment another day. She decided to come to the ED. Patient has been here 5 times this week since my last visit 1 week ago. She has spoken with the  who felt she can go home. When I talked to patient, she refused to engage.    I told patient that if she is uncooperative that I would consider admission and proceed to work on getting the state to assign a legal guardian. She then quickly admitted to wanting to go home.    Past Medical History  Past Medical History:   Diagnosis Date    ADHD (attention deficit hyperactivity disorder)     Bipolar 1 disorder (H)     Borderline personality disorder (H)     Depression     Depressive disorder     Intellectual disability     Obesity     Syncope      Past Surgical History:   Procedure Laterality Date    APPENDECTOMY      APPENDECTOMY       acetaminophen (TYLENOL) 325 MG tablet  albuterol (PROAIR HFA/PROVENTIL HFA/VENTOLIN HFA) 108 (90 Base) MCG/ACT inhaler  ARIPiprazole lauroxil ER (ARISTADA) 882 MG/3.2ML intra-muscular  bisacodyl (DULCOLAX) 5 MG EC tablet  calcium carbonate (TUMS) 500 MG chewable tablet  cyclobenzaprine (FLEXERIL) 10 MG tablet  dicyclomine (BENTYL) 20 MG tablet  docusate sodium (COLACE) 50 MG capsule  famotidine (PEPCID) 20 MG tablet  FLUoxetine (PROZAC) 40 MG capsule  hydrocortisone, Perianal, (HYDROCORTISONE) 2.5 % cream  hydrOXYzine (ATARAX) 50 MG tablet  LORazepam (ATIVAN) 0.5 MG tablet  mupirocin (BACTROBAN) 2 % external ointment  OLANZapine zydis (ZYPREXA) 5 MG  ODT  ondansetron (ZOFRAN) 4 MG tablet  ondansetron (ZOFRAN) 4 MG tablet  pantoprazole (PROTONIX) 40 MG EC tablet  polyethylene glycol (MIRALAX) 17 GM/Dose powder  promethazine (PHENERGAN) 12.5 MG tablet  sennosides (SENOKOT) 8.6 MG tablet  traZODone (DESYREL) 50 MG tablet  Vitamin D, Cholecalciferol, 25 MCG (1000 UT) TABS      Allergies   Allergen Reactions    Penicillins Rash and Unknown     Family History  Family History   Problem Relation Age of Onset    Diabetes Type 1 Father     Cancer Paternal Grandfather      Social History   Social History     Tobacco Use    Smoking status: Every Day     Packs/day: 3.00     Years: 5.00     Additional pack years: 0.00     Total pack years: 15.00     Types: Cigarettes    Smokeless tobacco: Never   Substance Use Topics    Alcohol use: No    Drug use: No         A medically appropriate review of systems was performed with pertinent positives and negatives noted in the HPI, and all other systems negative.    Physical Exam   BP: 123/82  Pulse: 99  Temp: 99.5  F (37.5  C)  Resp: 16  SpO2: 98 %  Physical Exam  Vitals and nursing note reviewed.   HENT:      Head: Normocephalic.   Eyes:      Pupils: Pupils are equal, round, and reactive to light.   Pulmonary:      Effort: Pulmonary effort is normal.   Musculoskeletal:         General: Normal range of motion.      Cervical back: Normal range of motion.   Neurological:      General: No focal deficit present.      Mental Status: She is alert.   Psychiatric:         Attention and Perception: Attention and perception normal.         Mood and Affect: Mood and affect normal.         Speech: Speech normal.         Behavior: Behavior normal. Behavior is not agitated, aggressive, hyperactive or combative. Behavior is cooperative.         Thought Content: Thought content normal. Thought content is not paranoid or delusional. Thought content does not include homicidal or suicidal ideation.         Cognition and Memory: Cognition and memory  normal.         Judgment: Judgment normal.           ED Course, Procedures, & Data      Procedures       A consult was attained from the  DEC service. The case was discussed with the  from that service. The consulting service's recommendations were provided at 4:30 PM. 10 minutes spent discussing case, care and disposition. 10 minutes spent reviewing prior records and intervention.   Mental Health Risk Assessment        PSS-3      Date and Time Over the past 2 weeks have you felt down, depressed, or hopeless? Over the past 2 weeks have you had thoughts of killing yourself? Have you ever attempted to kill yourself? When did this last happen? User   10/27/23 1404 yes yes yes within the last 24 hours (including today) KH          C-SSRS ([place])      Date and Time Q1 Wished to be Dead (Past Month) Q2 Suicidal Thoughts (Past Month) Q3 Suicidal Thought Method Q4 Suicidal Intent without Specific Plan Q5 Suicide Intent with Specific Plan Q6 Suicide Behavior (Lifetime) Within the Past 3 Months? RETIRED: Level of Risk per Screen Screening Not Complete User   10/27/23 1652 yes yes yes no no -- -- -- -- TTM   10/27/23 1404 yes yes yes no yes -- -- -- -- KH                Suicide assessment completed by mental health (D.E.C., LCSW, etc.)       No results found for any visits on 10/27/23.  Medications   OLANZapine zydis (zyPREXA) ODT tab 10 mg (10 mg Oral $Given 10/27/23 1602)     Labs Ordered and Resulted from Time of ED Arrival to Time of ED Departure - No data to display  No orders to display          Critical care was not performed.     Medical Decision Making  The patient's presentation was of moderate complexity (a chronic illness mild to moderate exacerbation, progression, or side effect of treatment).    The patient's evaluation involved:  an assessment requiring an independent historian (see separate area of note for details)  review of external note(s) from 3+ sources (see separate area of note for  details)    The patient's management necessitated moderate risk (limitations due to social determinants of health (inadequate community support)) and high risk (a decision regarding hospitalization).    Assessment & Plan    Patient is here due to feeling stressed. She is well-known to us due to her frequent/excessive use of the ED to get her needs met. Patient appears at baseline. She was refusing to engage with me but when told that I plan of pursuing a commitment and getting the state to assign a legal guardian, patient immediately reported that she wants to go home. Her reaction is consistent with manipulative behavior. She can be discharged. She is recommended to follow-up established care and services.    I have reviewed the nursing notes. I have reviewed the findings, diagnosis, plan and need for follow up with the patient.    Discharge Medication List as of 10/27/2023  4:27 PM          Final diagnoses:   Acute reaction to situational stress   Intellectual disability         [facility] EMERGENCY DEPARTMENT  10/27/2023     Magno Sena MD  10/27/23 6312

## 2023-10-27 NOTE — ED NOTES
Patient reports that she went to clinic d/t SI but was told that they could not help. This caused her to start threatening the staff there and they told her they were going to call the . She now wants to kill herself r/t this incident and blame it on the staff there.

## 2023-10-27 NOTE — ED NOTES
IP MH Referral Acuity Rating Score (RARS)    LMHP complete at referral to IP MH, with DEC; and, daily while awaiting IP MH placement. Call score to PPS.  CRITERIA SCORING   New 71 HH and Involuntary for IP MH (not adolescent) 0/1   Boarding over 24 hours 0/1   Vulnerable adult at least 55+ with multiple co morbidities; or, Patient age 11 or under 0/1   Suicide ideation without relief of precipitating factors 1/1   Current plan for suicide 1/1   Current plan for homicide 0/1   Imminent risk or actual attempt to seriously harm another without relief of factors precipitating the attempt 0/1   Severe dysfunction in daily living (ex: complete neglect for self care, extreme disruption in vegetative function, extreme deterioration in social interactions) 0/1   Recent (last 2 weeks) or current physical aggression in the ED 0/1   Restraints or seclusion episode in ED 0/1   Verbal aggression, agitation, yelling, etc., while in the ED 0/1   Active psychosis with psychomotor agitation or catatonia 0/1   Need for constant or near constant redirection (from leaving, from others, etc).  0/1   Intrusive or disruptive behaviors 1/1   TOTAL Acuity Total Score: 3      Irma Simmons MA, [facility]  10/27/23

## 2023-10-27 NOTE — ED TRIAGE NOTES
Triage Assessment (Adult)       Row Name 10/27/23 140          Triage Assessment    Airway WDL WDL        Respiratory WDL    Respiratory WDL WDL        Skin Circulation/Temperature WDL    Skin Circulation/Temperature WDL WDL        Cardiac WDL    Cardiac WDL WDL        Peripheral/Neurovascular WDL    Peripheral Neurovascular WDL WDL        Cognitive/Neuro/Behavioral WDL    Cognitive/Neuro/Behavioral WDL WDL

## 2023-10-30 ENCOUNTER — MEDICAL CORRESPONDENCE (OUTPATIENT)
Dept: HEALTH INFORMATION MANAGEMENT | Facility: CLINIC | Age: 23
End: 2023-10-30

## 2023-12-14 ENCOUNTER — HOSPITAL ENCOUNTER (EMERGENCY)
Facility: CLINIC | Age: 23
Discharge: HOME | End: 2023-12-14
Attending: EMERGENCY MEDICINE | Admitting: EMERGENCY MEDICINE
Payer: MEDICARE

## 2023-12-14 VITALS
OXYGEN SATURATION: 100 % | RESPIRATION RATE: 16 BRPM | DIASTOLIC BLOOD PRESSURE: 100 MMHG | TEMPERATURE: 98.1 F | SYSTOLIC BLOOD PRESSURE: 177 MMHG | HEART RATE: 101 BPM | WEIGHT: 240 LBS | BODY MASS INDEX: 42.51 KG/M2

## 2023-12-14 DIAGNOSIS — R45.851 SUICIDAL IDEATION: ICD-10-CM

## 2023-12-14 PROCEDURE — 99283 EMERGENCY DEPT VISIT LOW MDM: CPT

## 2023-12-14 ASSESSMENT — ACTIVITIES OF DAILY LIVING (ADL)
ADLS_ACUITY_SCORE: 37
ADLS_ACUITY_SCORE: 37

## 2023-12-14 NOTE — ED NOTES
Bed: [facility]  Expected date:   Expected time:   Means of arrival:   Comments:  [facility] - 23 F suicidal eta [id number]

## 2023-12-14 NOTE — ED NOTES
"When asked if pt has a suicidal plan pt states \"I want to bite myself until I bleed to death.\"    Pt is calm and resting, is not attempting to harm themself.   "

## 2023-12-14 NOTE — ED NOTES
Continuous observation with video monitoring and sitter initiated. Pt resting in bed with eyes closed. Pt's respirations are even and unlabored.

## 2023-12-14 NOTE — CONSULTS
"Diagnostic Evaluation Consultation  Crisis Assessment    Patient Name: Sadaf Ross  Age:  23 year old  Legal Sex: female  Gender Identity: female  Pronouns:   Race: White  Ethnicity: Not  or   Language: English      Patient was assessed: In person      Patient location: [facility] EMERGENCY DEPT                                 Referral Data and Chief Complaint  Sadaf Ross presents to the ED via EMS. Patient is presenting to the ED for the following concerns: Suicidal ideation.   Factors that make the mental health crisis life threatening or complex are:  Patient arrived to the ED via EMS from a pain clinic, where she was at earlier today for her roommate's appointment. She ednorsed shortness of breath to the clinic staff so they called for EMS. When EMS arrived, pt denied SOB but endorsed suicidal ideation. At the ED, pt has been reportedto be calm and cooperative. She told RN that her suicide plan is to bite her tongue until she bleeds to death. When writer entered [facility] to complete a DEC assessment, pt was just waking up from a nap. She stated that she feels well rested. She endorsed chronic SI, but denied them currently, stating \"I have them, but not now.\" She presented with a full range affect, alert, oriented x4, and bright in mood. She stated that she could call her insurance for a medical ride back to her group home, which she did with writer present. Pt joked and gigled with writer. She is looking forward to going shopping with group home staff this weekend..      Informed Consent and Assessment Methods  Explained the crisis assessment process, including applicable information disclosures and limits to confidentiality, assessed understanding of the process, and obtained consent to proceed with the assessment.  Assessment methods included conducting a formal interview with patient, review of medical records, collaboration with medical staff, and obtaining relevant " collateral information from family and community providers when available.  : done     Patient response to interventions: acceptance expressed  Coping skills were attempted to reduce the crisis:  Listening to music, completing cross word puzzles, and calling cope. She also goes to the ED.     History of the Crisis   This is patient's 15th ED visit since in two weeks. Pt has an ED care plan that was started in January 2023 due to frequent ED visits with seconday gains to get out of her group home. Per group home staff, she has chronic SI, but does not act on these thoughts. She has engaged in self-harm by scratching herself. She has outpatient mental health providers and is generally medication compliant.    Brief Psychosocial History  Family:  Single, Children no  Support System:  PCA  Employment Status:  disabled  Source of Income:  disability  Financial Environmental Concerns:  none  Current Hobbies:  music, puzzles, group/social activities, television/movies/videos  Barriers in Personal Life:  cognitive limitations, mental health concerns    Significant Clinical History  Current Anxiety Symptoms:     Current Depression/Trauma:     Current Somatic Symptoms:     Current Psychosis/Thought Disturbance:  impulsive  Current Eating Symptoms:     Chemical Use History:  Alcohol: None  Benzodiazepines: None  Opiates: None  Cocaine: None  Marijuana: None  Other Use: None   Past diagnosis:  ADHD, Anxiety Disorder, Bipolar Disorder, Depression, Personality Disorder, Suicide attempt(s)  Family history:  Anxiety Disorder, Depression  Past treatment:  Individual therapy, Case management, Psychiatric Medication Management, Supportive Living Environment (group home, [facility] house, etc), Inpatient Hospitalization, [facility]  Details of most recent treatment:  Patient has numerous ED visits.There is an ED care plan. This is pt's 15th visit in the last two weeks and 27th visitin the last month. Pt lives in a group home ([facility] that is staffed . Pt has a PCA.  She has OP therapy and psychiatry.  Other relevant history:          Collateral Information  Is there collateral information: Yes     Collateral information name, relationship, phone number:  Arlene - Group Home Staff [phone]    What happened today: Staff were aware that pt had returned home from the [facility] ED early this morning, but they were not aware of her being at the ED currently. They aware that she left this morning for an appointment.     What is different about patient's functioning: Staff report that this is pt's baseline behavior. She will \"make up anything,\" to go to the hospital. She has chronic SI but doesn't act on it. She threatens to harm herself, but has only scratched herself. They report no concerns for aggression, besides door slamming when she is upset. She has tendency to elope, but always comes right back. She is medication compliant generally, refusing occasionally.     Has patient made comments about wanting to kill themselves/others: yes    If d/c is recommended, can they take part in safety/aftercare planning: yes    Additional collateral information:  She tends to go to the ER more in the winter time. Her father's memorial is coming up in February. Staff believes he  three years ago.     Risk Assessment    [place] Suicide Severity Rating Scale Recent:   Suicidal Ideation (Recent)  Q1 Wished to be Dead (Past Month): yes  Q2 Suicidal Thoughts (Past Month): yes  Q3 Suicidal Thought Method: yes  Q4 Suicidal Intent without Specific Plan: no  Q5 Suicide Intent with Specific Plan: no  Level of Risk per Screen: moderate risk  Intensity of Ideation (Recent)  Most Severe Ideation Rating (Past 1 Month): 3  Description of Most Severe Ideation (Past 1 Month): Pt had SI with a plan to bite her tongue until she bleeds out earlier today.  Frequency (Past 1 Month): 2-5 times in week  Duration (Past 1 Month): Fleeting, few seconds or " minutes  Controllability (Past 1 Month): Can control thoughts with little difficulty  Deterrents (Past 1 Month): Deterrents definitely stopped you from attempting suicide  Reasons for Ideation (Past 1 Month): Equally to get attention, revenge, or a reaction from others and to end/stop the pain  Suicidal Behavior (Recent)  Actual Attempt (Past 3 Months): No  Has subject engaged in non-suicidal self-injurious behavior? (Past 3 Months): Yes (scratching self)  Interrupted Attempts (Past 3 Months): No  Aborted or Self-Interrupted Attempt (Past 3 Months): No  Preparatory Acts or Behavior (Past 3 Months): No    Environmental or Psychosocial Events: loss of a loved one, helplessness/hopelessness, impulsivity/recklessness, other life stressors  Protective Factors: Protective Factors: strong bond to family unit, community support, or employment, responsibilities and duties to others, including pets and children, lives in a responsibly safe and stable environment, help seeking, able to access care without barriers    Does the patient have thoughts of harming others? Feels Like Hurting Others: no  Previous Attempt to Hurt Others: no  Is the patient engaging in sexually inappropriate behavior?: no    Is the patient engaging in sexually inappropriate behavior?  no        Mental Status Exam   Affect: Appropriate  Appearance: Appropriate  Attention Span/Concentration: Attentive  Eye Contact: Engaged    Fund of Knowledge: Appropriate   Language /Speech Content: Fluent  Language /Speech Volume: Normal  Language /Speech Rate/Productions: Normal  Recent Memory: Intact  Remote Memory: Intact  Mood: Normal  Orientation to Person: Yes   Orientation to Place: Yes  Orientation to Time of Day: Yes  Orientation to Date: Yes     Situation (Do they understand why they are here?): Yes  Psychomotor Behavior: Normal  Thought Content: Clear  Thought Form: Goal Directed           Current Care Team  Patient Care Team:  [facility] as PCP - General  (Clinic)  Chloe Alva APRN CNP as Nurse Practitioner (OB/Gyn)  Harley Esparza CNM as Assigned OBGYN Provider  Seble Jones PA-C as Physician Assistant    Diagnosis  Patient Active Problem List   Diagnosis Code    MENTAL HEALTH     Suicidal ideation R45.851    Suicidal ideations R45.851    Intellectual disability F79    Bipolar affective disorder, currently depressed, moderate (H) F31.32    Borderline personality disorder (H) F60.3    Morbid obesity (H) E66.01       Primary Problem This Admission  Active Hospital Problems    *Borderline personality disorder (H)      Intellectual disability        Clinical Summary and Substantiation of Recommendations   Karo presented to the ED for SI. However, she denied all SI and self harm urges to writer at the time of assessment. She attributed the abatement of SI to the sleep she got while in the ED. She presented as alert, oriented, and coherent. She was bubbly in mood and is looking forward to shopping this weekend with group home staff. She has established OP mental health providers and is medication compliant. Pt requested to use her cell phone to call her insurance to arrange a medical ride to her group home.  staff were updated and pt has been discharged.                   Patient coping skills attempted to reduce the crisis:  Listening to music, completing cross word puzzles, and calling cope. She also goes to the ED.    Disposition  Recommended disposition: Group Home        Reviewed case and recommendations with attending provider. Attending Name: Dr. Gonzales       Attending concurs with disposition: yes       Patient and/or validated legal guardian concurs with disposition: yes       Final disposition:  discharge    Legal status on admission:      Assessment Details   Total duration spent with the patient: 30 min     CPT code(s) utilized: 49741 - Psychotherapy for Crisis - 60 (30-74*) min    CHERI Mary, Psychotherapist  DEC  - Triage & Transition Services  Callback: [phone]

## 2023-12-14 NOTE — ED TRIAGE NOTES
Pt BIB EMS, from a pain clinic in [place] (pt was there for her roommate), pt initially had reported passing out in the car on the way to the clinic (20 minutes each time), and then reported to clinic staff that she was SOB, who then called 911. First responders came and then pt denied SOB, but did report being suicidal.     Pt now reporting chronic back pain, requesting ibuprofen.

## 2023-12-14 NOTE — ED NOTES
Pt spoke with DEC. Pt no longer suicidal. 1:1 discontinued. Continuing video monitoring. Spoke with Arlene at group home, updated about plan

## 2023-12-14 NOTE — ED PROVIDER NOTES
History     Chief Complaint:  Suicidal    The history is provided by the patient.      Sadaf Ross is a 23 year old female with a history of bipolar 1 disorder, personality disorder, learning disability, and homicidal/suicidal ideations who presents via EMS to the emergency department for suicidal ideation. The patient states that she was seen at [facility] earlier today for suicidal ideation and subsequently discharged. She reports that her suicidal plan is to bite herself until she bleeds to death. She notes that she still lives in her group home.  She also has been taking her medications as prescribed apparently.    Independent Historian:   None - Patient Only    Review of External Notes:   I reviewed the patient's emergency department encounter from [facility], today.  I reviewed the patient's Urgent Care note from yesterday as well as Urgent Care note from 12/12/23.  I reviewed the patient's [facility] note from 12/10/23, she reported being suicidal.  I reviewed the patient's care plan.    Medications:    Albuterol  Aripiprazole  Bisacodyl  Cyclobenzaprine  Dicyclomine  Famotidine  Fluoxetine  Hydroxyzine  Lorazepam  Olanzapine  Ondansetron  Pantoprazole  Promethazine  Trazodone    Past Medical History:    ADHD  Bipolar 1 disorder  Borderline personality  Depression  Intellectual disability  Obesity  SI  HI  Appendicitis  Anxiety  Self-injurious behavior    Past Surgical History:    LAP appendectomy     Physical Exam   Patient Vitals for the past 24 hrs:   BP Temp Temp src Pulse Resp SpO2 Weight   12/14/23 1433 (!) 177/100 98.1  F (36.7  C) Temporal 101 16 100 % 108.9 kg (240 lb)      Physical Exam  Nursing note and vitals reviewed.  Constitutional:  Oriented to person, place, and time. Cooperative.   HENT:   Nose:    Nose normal.   Mouth/Throat:   Mucous membranes are normal.   Eyes:    Conjunctivae normal and EOM are normal.      Pupils are equal, round, and reactive to light.   Neck:     Trachea normal.   Cardiovascular:  Normal rate, regular rhythm, normal heart sounds and normal pulses. No murmur heard.  Pulmonary/Chest:  Effort normal and breath sounds normal.   Abdominal:   Soft. Normal appearance and bowel sounds are normal.      There is no tenderness.      There is no rebound and no CVA tenderness.   Musculoskeletal:  Extremities atraumatic x 4.   Lymphadenopathy:  No cervical adenopathy.   Neurological:   Alert and oriented to person, place, and time. Normal strength.      No cranial nerve deficit or sensory deficit. GCS eye subscore is 4. GCS verbal subscore is 5. GCS motor subscore is 6.   Skin:    Skin is intact. No rash noted.   Psychiatric:   Flat affect present with some vague ongoing suicidal thoughts.    Emergency Department Course     Emergency Department Course & Assessments:    Interventions:  None     Assessments:  1509 I obtained history and examined the patient as noted above.   1710 I discussed findings and discharge with the patient. All questions answered.     Independent Interpretation (X-rays, CTs, rhythm strip):  None    Consultations/Discussion of Management or Tests:  1702 I spoke with the patient's therapist. She informed me that the patient is not currently experiencing suicidal ideations and is in a joyful mood.     Social Determinants of Health affecting care:   Mental health issues    Disposition:  The patient was discharged to home.     Impression & Plan      Medical Decision Making:  This is a 23-year-old female who is a very frequent user of the ER who was brought here by EMS for further evaluation of suicidal ideation.  She did not do anything to harm herself today, and I did not feel that any testing was necessary.  I reviewed her chart extensively, and she has been seen about 11 times in other ERs just this month alone already.  She then underwent a DEC assessment, and the patient is no longer suicidal.  The DEC  spoke with the patient's group home  as well, and everyone is comfortable with her going back there including the patient.  Therefore I am discharging her back to her group home.  She knows to follow-up with her physician as needed and certainly return here or another ER with any concerns.    Diagnosis:    ICD-10-CM    1. Suicidal ideation  R45.851          Scribe Disclosure:  I, Garrett Anderson, am serving as a scribe at 2:49 PM on 12/14/2023 to document services personally performed by Gregg Gonzales MD based on my observations and the provider's statements to me.     12/14/2023   Gregg Gonzales MD Lashkowitz, Seth H, MD  12/14/23 2505

## 2023-12-15 ENCOUNTER — HOSPITAL ENCOUNTER (EMERGENCY)
Facility: CLINIC | Age: 23
End: 2023-12-15
Payer: MEDICARE

## 2023-12-15 ENCOUNTER — HOSPITAL ENCOUNTER (EMERGENCY)
Facility: CLINIC | Age: 23
Discharge: HOME | End: 2023-12-15
Attending: STUDENT IN AN ORGANIZED HEALTH CARE EDUCATION/TRAINING PROGRAM | Admitting: STUDENT IN AN ORGANIZED HEALTH CARE EDUCATION/TRAINING PROGRAM
Payer: MEDICARE

## 2023-12-15 VITALS
BODY MASS INDEX: 45.36 KG/M2 | DIASTOLIC BLOOD PRESSURE: 68 MMHG | TEMPERATURE: 98.7 F | HEART RATE: 94 BPM | RESPIRATION RATE: 18 BRPM | HEIGHT: 63 IN | SYSTOLIC BLOOD PRESSURE: 149 MMHG | WEIGHT: 256 LBS | OXYGEN SATURATION: 99 %

## 2023-12-15 DIAGNOSIS — M54.50 BILATERAL LOW BACK PAIN WITHOUT SCIATICA, UNSPECIFIED CHRONICITY: Primary | ICD-10-CM

## 2023-12-15 LAB
ALBUMIN SERPL BCG-MCNC: 4.3 G/DL (ref 3.5–5.2)
ALBUMIN UR-MCNC: NEGATIVE MG/DL
ALP SERPL-CCNC: 67 U/L (ref 40–150)
ALT SERPL W P-5'-P-CCNC: 19 U/L (ref 0–50)
ANION GAP SERPL CALCULATED.3IONS-SCNC: 8 MMOL/L (ref 7–15)
APPEARANCE UR: CLEAR
AST SERPL W P-5'-P-CCNC: 23 U/L (ref 0–45)
ATRIAL RATE - MUSE: 76 BPM
ATRIAL RATE - MUSE: 82 BPM
BASOPHILS # BLD AUTO: 0 10E3/UL (ref 0–0.2)
BASOPHILS NFR BLD AUTO: 1 %
BILIRUB SERPL-MCNC: 0.2 MG/DL
BILIRUB UR QL STRIP: NEGATIVE
BUN SERPL-MCNC: 8.8 MG/DL (ref 6–20)
CALCIUM SERPL-MCNC: 9.2 MG/DL (ref 8.6–10)
CHLORIDE SERPL-SCNC: 106 MMOL/L (ref 98–107)
COLOR UR AUTO: ABNORMAL
CREAT SERPL-MCNC: 0.85 MG/DL (ref 0.51–0.95)
DEPRECATED HCO3 PLAS-SCNC: 24 MMOL/L (ref 22–29)
DIASTOLIC BLOOD PRESSURE - MUSE: NORMAL MMHG
DIASTOLIC BLOOD PRESSURE - MUSE: NORMAL MMHG
EGFRCR SERPLBLD CKD-EPI 2021: >90 ML/MIN/1.73M2
EOSINOPHIL # BLD AUTO: 0.2 10E3/UL (ref 0–0.7)
EOSINOPHIL NFR BLD AUTO: 3 %
ERYTHROCYTE [DISTWIDTH] IN BLOOD BY AUTOMATED COUNT: 13 % (ref 10–15)
FLUAV RNA SPEC QL NAA+PROBE: NEGATIVE
FLUBV RNA RESP QL NAA+PROBE: NEGATIVE
GLUCOSE SERPL-MCNC: 101 MG/DL (ref 70–99)
GLUCOSE UR STRIP-MCNC: NEGATIVE MG/DL
HCG UR QL: NEGATIVE
HCT VFR BLD AUTO: 36.4 % (ref 35–47)
HGB BLD-MCNC: 12 G/DL (ref 11.7–15.7)
HGB UR QL STRIP: NEGATIVE
HOLD SPECIMEN: NORMAL
HOLD SPECIMEN: NORMAL
IMM GRANULOCYTES # BLD: 0 10E3/UL
IMM GRANULOCYTES NFR BLD: 0 %
INTERPRETATION ECG - MUSE: NORMAL
INTERPRETATION ECG - MUSE: NORMAL
KETONES UR STRIP-MCNC: NEGATIVE MG/DL
LEUKOCYTE ESTERASE UR QL STRIP: NEGATIVE
LYMPHOCYTES # BLD AUTO: 2 10E3/UL (ref 0.8–5.3)
LYMPHOCYTES NFR BLD AUTO: 33 %
MCH RBC QN AUTO: 27.4 PG (ref 26.5–33)
MCHC RBC AUTO-ENTMCNC: 33 G/DL (ref 31.5–36.5)
MCV RBC AUTO: 83 FL (ref 78–100)
MONOCYTES # BLD AUTO: 0.3 10E3/UL (ref 0–1.3)
MONOCYTES NFR BLD AUTO: 5 %
MUCOUS THREADS #/AREA URNS LPF: PRESENT /LPF
NEUTROPHILS # BLD AUTO: 3.6 10E3/UL (ref 1.6–8.3)
NEUTROPHILS NFR BLD AUTO: 58 %
NITRATE UR QL: NEGATIVE
NRBC # BLD AUTO: 0 10E3/UL
NRBC BLD AUTO-RTO: 0 /100
P AXIS - MUSE: 32 DEGREES
P AXIS - MUSE: 47 DEGREES
PH UR STRIP: 5 [PH] (ref 5–7)
PLATELET # BLD AUTO: 214 10E3/UL (ref 150–450)
POTASSIUM SERPL-SCNC: 4.4 MMOL/L (ref 3.4–5.3)
PR INTERVAL - MUSE: 164 MS
PR INTERVAL - MUSE: 182 MS
PROT SERPL-MCNC: 7.1 G/DL (ref 6.4–8.3)
QRS DURATION - MUSE: 84 MS
QRS DURATION - MUSE: 88 MS
QT - MUSE: 380 MS
QT - MUSE: 402 MS
QTC - MUSE: 443 MS
QTC - MUSE: 452 MS
R AXIS - MUSE: 24 DEGREES
R AXIS - MUSE: 58 DEGREES
RBC # BLD AUTO: 4.38 10E6/UL (ref 3.8–5.2)
RBC URINE: 1 /HPF
RSV RNA SPEC NAA+PROBE: NEGATIVE
SARS-COV-2 RNA RESP QL NAA+PROBE: NEGATIVE
SODIUM SERPL-SCNC: 138 MMOL/L (ref 135–145)
SP GR UR STRIP: 1.02 (ref 1–1.03)
SQUAMOUS EPITHELIAL: 2 /HPF
SYSTOLIC BLOOD PRESSURE - MUSE: NORMAL MMHG
SYSTOLIC BLOOD PRESSURE - MUSE: NORMAL MMHG
T AXIS - MUSE: -8 DEGREES
T AXIS - MUSE: 18 DEGREES
TROPONIN T SERPL HS-MCNC: <6 NG/L
UROBILINOGEN UR STRIP-MCNC: NORMAL MG/DL
VENTRICULAR RATE- MUSE: 76 BPM
VENTRICULAR RATE- MUSE: 82 BPM
WBC # BLD AUTO: 6.2 10E3/UL (ref 4–11)
WBC URINE: 1 /HPF

## 2023-12-15 PROCEDURE — 85025 COMPLETE CBC W/AUTO DIFF WBC: CPT | Performed by: STUDENT IN AN ORGANIZED HEALTH CARE EDUCATION/TRAINING PROGRAM

## 2023-12-15 PROCEDURE — 81001 URINALYSIS AUTO W/SCOPE: CPT | Performed by: STUDENT IN AN ORGANIZED HEALTH CARE EDUCATION/TRAINING PROGRAM

## 2023-12-15 PROCEDURE — 36415 COLL VENOUS BLD VENIPUNCTURE: CPT | Performed by: EMERGENCY MEDICINE

## 2023-12-15 PROCEDURE — 84484 ASSAY OF TROPONIN QUANT: CPT | Performed by: STUDENT IN AN ORGANIZED HEALTH CARE EDUCATION/TRAINING PROGRAM

## 2023-12-15 PROCEDURE — 81025 URINE PREGNANCY TEST: CPT | Performed by: STUDENT IN AN ORGANIZED HEALTH CARE EDUCATION/TRAINING PROGRAM

## 2023-12-15 PROCEDURE — 99284 EMERGENCY DEPT VISIT MOD MDM: CPT

## 2023-12-15 PROCEDURE — 87637 SARSCOV2&INF A&B&RSV AMP PRB: CPT | Performed by: STUDENT IN AN ORGANIZED HEALTH CARE EDUCATION/TRAINING PROGRAM

## 2023-12-15 PROCEDURE — 85025 COMPLETE CBC W/AUTO DIFF WBC: CPT | Performed by: EMERGENCY MEDICINE

## 2023-12-15 PROCEDURE — 80053 COMPREHEN METABOLIC PANEL: CPT | Performed by: STUDENT IN AN ORGANIZED HEALTH CARE EDUCATION/TRAINING PROGRAM

## 2023-12-15 PROCEDURE — 93005 ELECTROCARDIOGRAM TRACING: CPT | Mod: RTG

## 2023-12-15 RX ORDER — IBUPROFEN 200 MG
400 TABLET ORAL EVERY 6 HOURS PRN
Qty: 60 TABLET | Refills: 0 | Status: SHIPPED | OUTPATIENT
Start: 2023-12-15 | End: 2024-05-19

## 2023-12-15 NOTE — ED TRIAGE NOTES
Patient presents from group home for multiple complaints. Reports back pain (chronic),  urinary frequency, chest pain, nasal congestion and cough. Cabbed self from group home and reports being able to set up ride home at discharge.

## 2023-12-15 NOTE — ED PROVIDER NOTES
"History   Chief Complaint:  Chest Pain and Back Pain       HPI:  Sadaf Ross is a pleasant 23 year old female presenting with lower back pain. Patient says that she has been experiencing pain for a couple of weeks. She has been taking 2 ibuprofen every 4 hours for pain management and this has been working. Patient says that she came to the ED because she ran out of ibuprofen. She denies fever, chills, urinary symptoms, abdominal pain, nausea, vomiting, diarrhea, shortness of breath, or cough. No weakness or numbness in her extremities. Patient says that she previously had chest pain related to anxiety, but this has since subsided.  Arrival, patient did also report urinary frequency, chest pain, nasal congestion and cough.  She denies any symptoms on my evaluation, however she is keen to know the results of her COVID test.    Of note, the patient does have a history of intellectual disability and borderline personality disorder along with bipolar affective disorder.  Also of note, patient does have an ED Plan regarding presentations for mental health.  Also of note, the patient is a frequent utilizer of the emergency department.  This is her 16th visit so far in December [greater than 1/day].     Independent Historian:  None. Only the patient provided history.    Review of External Notes:  I personally reviewed notes from the patient's  DEC evaluation note  dated yesterday. This provided me with information regarding patient's recent clinical course.     I personally reviewed the patient's chart, including available medication list and available past medical history, past surgical history, family history, and social history.    Physical Exam   Patient Vitals for the past 24 hrs:   BP Temp Temp src Pulse Resp SpO2 Height Weight   12/15/23 0828 (!) 149/68 -- -- -- -- -- -- --   12/15/23 0826 -- 98.7  F (37.1  C) Temporal 94 18 99 % 1.6 m (5' 3\") 116.1 kg (256 lb)      Physical Exam  Vitals and nursing note " reviewed.   Constitutional:       General: She is not in acute distress.     Appearance: She is obese. She is not ill-appearing.   Cardiovascular:      Rate and Rhythm: Normal rate and regular rhythm.   Pulmonary:      Effort: Pulmonary effort is normal.   Chest:      Chest wall: No tenderness.   Abdominal:      Palpations: Abdomen is soft.      Tenderness: There is no abdominal tenderness.   Skin:     General: Skin is warm and dry.   Neurological:      Mental Status: She is alert and oriented to person, place, and time.             Emergency Department Course     Imaging & ECG: Laboratory:   ECG results from 12/15/23   EKG 12 lead     Value    Systolic Blood Pressure     Diastolic Blood Pressure     Ventricular Rate 82    Atrial Rate 82    HI Interval 164    QRS Duration 84        QTc 443    P Axis 32    R AXIS 58    T Axis -8    Interpretation ECG      Sinus rhythm  T wave abnormality, consider inferior ischemia  Abnormal ECG  When compared with ECG of 25-OCT-2023 21:31,  T wave inversion now evident in Inferior leads  Nonspecific T wave abnormality now evident in Anterior leads  Confirmed by GENERATED REPORT, COMPUTER ([id number]),  Ely Mireles ([id number]) on 12/15/2023 9:21:35 AM     EKG 12-lead, tracing only     Value    Systolic Blood Pressure     Diastolic Blood Pressure     Ventricular Rate 76    Atrial Rate 76    HI Interval 182    QRS Duration 88        QTc 452    P Axis 47    R AXIS 24    T Axis 18    Interpretation ECG      Sinus rhythm  Normal ECG  When compared with ECG of 15-DEC-2023 08:28,  Nonspecific T wave abnormality no longer evident in Anterolateral leads  Confirmed by GENERATED REPORT, COMPUTER ([id number]),  Ely Mireles ([id number]) on 12/15/2023 10:51:18 AM           No orders to display          Labs Ordered and Resulted from Time of ED Arrival to Time of ED Departure   COMPREHENSIVE METABOLIC PANEL - Abnormal       Result Value    Sodium 138      Potassium 4.4      Carbon Dioxide (CO2) 24       Anion Gap 8      Urea Nitrogen 8.8      Creatinine 0.85      GFR Estimate >90      Calcium 9.2      Chloride 106      Glucose 101 (*)     Alkaline Phosphatase 67      AST 23      ALT 19      Protein Total 7.1      Albumin 4.3      Bilirubin Total 0.2     ROUTINE UA WITH MICROSCOPIC REFLEX TO CULTURE - Abnormal    Color Urine Light Yellow      Appearance Urine Clear      Glucose Urine Negative      Bilirubin Urine Negative      Ketones Urine Negative      Specific Gravity Urine 1.020      Blood Urine Negative      pH Urine 5.0      Protein Albumin Urine Negative      Urobilinogen Urine Normal      Nitrite Urine Negative      Leukocyte Esterase Urine Negative      Mucus Urine Present (*)     RBC Urine 1      WBC Urine 1      Squamous Epithelials Urine 2 (*)    TROPONIN T, HIGH SENSITIVITY - Normal    Troponin T, High Sensitivity <6     HCG QUALITATIVE URINE - Normal    hCG Urine Qualitative Negative     INFLUENZA A/B, RSV, & SARS-COV2 PCR - Normal    Influenza A PCR Negative      Influenza B PCR Negative      RSV PCR Negative      SARS CoV2 PCR Negative     CBC WITH PLATELETS AND DIFFERENTIAL    WBC Count 6.2      RBC Count 4.38      Hemoglobin 12.0      Hematocrit 36.4      MCV 83      MCH 27.4      MCHC 33.0      RDW 13.0      Platelet Count 214      % Neutrophils 58      % Lymphocytes 33      % Monocytes 5      % Eosinophils 3      % Basophils 1      % Immature Granulocytes 0      NRBCs per 100 WBC 0      Absolute Neutrophils 3.6      Absolute Lymphocytes 2.0      Absolute Monocytes 0.3      Absolute Eosinophils 0.2      Absolute Basophils 0.0      Absolute Immature Granulocytes 0.0      Absolute NRBCs 0.0           Procedures  None performed    Interventions & Assessments:  PSS-3      Date and Time Over the past 2 weeks have you felt down, depressed, or hopeless? Over the past 2 weeks have you had thoughts of killing yourself? Have you ever attempted to kill yourself? When did this last happen? User   12/15/23  0836 yes yes yes more than 6 months ago LS            Suicide assessment completed by mental health (D.E.C., LCSW, etc.)    Interventions:  Medications - No data to display     Assessments:     9:54 AM  I obtained history and performed initial assessment of the patient. Patient will be discharged.     Independent Interpretation (X-rays, CTs, rhythm strip):  None    Consultations/Discussion of Management or Tests:  None    Social Determinants of Health affecting care:   None.      Disposition:  The patient was discharged to home.     Impression & Plan     Medical Decision Making:  Patient presenting with low back pain which is not new and running out of ibuprofen.  Vital signs are reassuring.  Patient did have other complaints upon arrival but she does not have these complaints on my assessment.  Workup was ordered by triage team.  Initial EKG obtained was concerning for QRS axis changes and T wave changes in limb leads.  On my assessment and upon repeating EKG, this is likely secondary to limb lead reversal.  Abnormalities were resolved on second EKG. lab workup here including CBC, CMP, troponin, hCG and urinalysis were all unremarkable.  I did provide the patient with a prescription for ibuprofen and advised her how to obtain this at pharmacies. Findings were discussed. , Additional verbal instructions were provided. , I discussed specific warning signs and instructed the patient to return to the emergency department if there are any concerns., Understanding of instructions was voiced, questions were answered and the patient was discharged.     Diagnosis:    ICD-10-CM    1. Bilateral low back pain without sciatica, unspecified chronicity  M54.50            Discharge Medications:  Discharge Medication List as of 12/15/2023 10:50 AM        START taking these medications    Details   ibuprofen (ADVIL/MOTRIN) 200 MG tablet Take 2 tablets (400 mg) by mouth every 6 hours as needed for pain, Disp-60 tablet, R-0,  E-Prescribe              Manav Knott MD  12/15/23 7342

## 2023-12-16 ENCOUNTER — HOSPITAL ENCOUNTER (EMERGENCY)
Facility: CLINIC | Age: 23
Discharge: HOME | End: 2023-12-16
Attending: EMERGENCY MEDICINE | Admitting: EMERGENCY MEDICINE
Payer: MEDICARE

## 2023-12-16 VITALS
WEIGHT: 254 LBS | OXYGEN SATURATION: 100 % | TEMPERATURE: 96.3 F | SYSTOLIC BLOOD PRESSURE: 127 MMHG | RESPIRATION RATE: 16 BRPM | HEIGHT: 63 IN | DIASTOLIC BLOOD PRESSURE: 61 MMHG | BODY MASS INDEX: 45 KG/M2 | HEART RATE: 88 BPM

## 2023-12-16 DIAGNOSIS — R51.9 ACUTE NONINTRACTABLE HEADACHE, UNSPECIFIED HEADACHE TYPE: ICD-10-CM

## 2023-12-16 PROCEDURE — 99283 EMERGENCY DEPT VISIT LOW MDM: CPT

## 2023-12-16 NOTE — ED PROVIDER NOTES
"  History     Chief Complaint:  Headache       The history is provided by the patient.      Sadaf Ross is a 23 year old female with a significant past medical history presents for evaluation of headache. She presents from her group home with headache and abdominal pain and feeling unwell. This is her 15th visit to the ED this month. Yesterday she had a negative pregnancy test, normal troponin, CBC, BMP, and UA. She also had a negative influenza, RSV, and SARS-CoV2.     Independent Historian:   None - Patient Only    Review of External Notes:   None     Medications:    Albuterol  Aripiprazole  Bisacodyl  Cyclobenzaprine  Dicyclomine  Famotidine  Fluoxetine  Hydroxyzine  Lorazepam  Olanzapine  Ondansetron  Pantoprazole  Promethazine  Trazodone     Past Medical History:    ADHD  Bipolar 1 disorder  Borderline personality  Depression  Intellectual disability  Obesity  SI  HI  Appendicitis  Anxiety  Self-injurious behavior     Past Surgical History:    LAP appendectomy     Physical Exam   Patient Vitals for the past 24 hrs:   BP Temp Temp src Pulse Resp SpO2 Height Weight   12/16/23 1455 127/61 (!) 96.3  F (35.7  C) Oral 88 16 100 % 1.6 m (5' 3\") 115.2 kg (254 lb)        Physical Exam  Constitutional: Alert, attentive, GCS 15   Eyes: EOM are normal, anicteric, conjugate gaze  CV: distal extremities warm, well perfused  Chest: Non-labored breathing on RA  Neurological: Alert, attentive, moving all extremities equally.   Skin: Skin is warm and dry.    Emergency Department Course     Interventions:  Medications - No data to display     Assessments:  1759 I obtained history and examined the patient as noted above.    Independent Interpretation (X-rays, CTs, rhythm strip):  None    Consultations/Discussion of Management or Tests:  None        Social Determinants of Health affecting care:   Recurrent ER visits, nearly daily.     Disposition:  The patient was discharged to home.     Impression & Plan    CMS Diagnoses: " None    Medical Decision Makin-year-old past medical history seen for bipolar disorder, prior personalities were, depression presenting from her group home for evaluation of headache.  After waiting in triage for several hours, she signaled to nursing that she was ready to leave I was asked to see the patient prior to her eloping.  When I met her, she declined that she has a headache, I did review with her that she was seen here in the emergency room yesterday with normal labs doing COVID influenza a pregnancy test and urine testing.  She denies abdominal pain to me or headache.  I do not see indication for additional labs or imaging and she was discharged with her sister picking her up.     Diagnosis:    ICD-10-CM    1. Acute nonintractable headache, unspecified headache type  R51.9         Grady Ledbetter MD  [employer]  6:16 PM 23     Scribe Disclosure:  I, Mello Ward, am serving as a scribe at 5:58 PM on 2023 to document services personally performed by Grady Ledbetter MD based on my observations and the provider's statements to me.   2023   Grady Ledbetter MD Dunbar, John Forrest, MD  [phone]

## 2023-12-16 NOTE — ED TRIAGE NOTES
Patient herd in the lobby telling someone she is going to kill herself at [facility]. Patient would like to leave without being seen, but writer said she is unable to provide transportation to her until she is seen by a provider.

## 2023-12-16 NOTE — ED TRIAGE NOTES
BIBA from group home with headache, abd pain and felling unwell. Was here yesterday, has several visits to ED.

## 2023-12-17 ENCOUNTER — HOSPITAL ENCOUNTER (EMERGENCY)
Facility: CLINIC | Age: 23
Discharge: HOME | End: 2023-12-17
Attending: EMERGENCY MEDICINE | Admitting: EMERGENCY MEDICINE
Payer: MEDICARE

## 2023-12-17 VITALS
OXYGEN SATURATION: 96 % | TEMPERATURE: 98.3 F | RESPIRATION RATE: 21 BRPM | HEART RATE: 85 BPM | SYSTOLIC BLOOD PRESSURE: 140 MMHG | DIASTOLIC BLOOD PRESSURE: 75 MMHG

## 2023-12-17 DIAGNOSIS — R11.0 NAUSEA: ICD-10-CM

## 2023-12-17 PROCEDURE — 99283 EMERGENCY DEPT VISIT LOW MDM: CPT

## 2023-12-17 NOTE — ED TRIAGE NOTES
Pt presents to ed to be evaluated for nausea and vomiting.   Pt states that she started vomiting this am. Pt states that she is anxious also, but denies SI. Pt drinking juice in triage room.        Triage Assessment (Adult)       Row Name 12/17/23 1638          Triage Assessment    Airway WDL WDL        Respiratory WDL    Respiratory WDL WDL        Cardiac WDL    Cardiac WDL WDL

## 2023-12-17 NOTE — ED PROVIDER NOTES
History     Chief Complaint:  Anxiety and Nausea & Vomiting     The history is provided by the patient.      Sadaf Ross is a 23 year old female with history of anxiety who presents to the ED via EMS for evaluation of anxiety, nausea, and vomiting. The patient reports that she started vomiting up stomach acid around 0800 and stopped at 0930. She also had nausea, but this has resolved. States that she is concerned that her stomach is always cold and it has never been that way before. Adds that she did have a headache and back pain, but both resolved after taking Advil.  Denies diarrhea or cough.    Independent Historian:    None     Review of External Notes:  I reviewed her ED note from yesterday.    Medications:    Albuterol  Aristada  Dulcolax   Flexeril  Bentyl  Colace  Pepcid  Prozac  Atarax  Ativan   Zyprexa  Zofran   Protonix  Miralax  Phenegran  Senokot  Trazodone  Prevacid   Glycolax   Senna  Loperamide   Depo-Provera  Abilify   Effexor   Imodium   Revia   Cogentin   Compazine   Remeron  Vistaril   Lunesta     Past Medical History:    ADHD  Bipolar 1 disorder  Borderline personality disorder  Depression  Intellectual disability  Obesity  Bipolar affective disorder  Anxiety   Chronic constipation   PTSD  Generalized social phobia   Vitamin D deficiency   Developmental disorder  Developmental speech or language disorder  Adjustment disorder with mixed disturbance of emotions and conduct  Hyperhidrosis   Cognitive impairment   Episodic mood disorder  Psychosis   Suicidal ideation   Appendicitis   Eating disorder   Mild stereotypic movement disorder     Past Surgical History:    Appendectomy    Physical Exam   Patient Vitals for the past 24 hrs:   BP Temp Temp src Pulse Resp SpO2   12/17/23 1641 -- -- -- 85 -- 96 %   12/17/23 1640 (!) 140/75 98.3  F (36.8  C) Temporal -- 21 --      Physical Exam  Constitutional: Vital signs reviewed.  Anxious appearing.  HEENT: Moist mucous membranes  Cardiovascular:  Regular rate and rhythm  Pulmonary/Chest: Breathing comfortably on room air.  No audible wheezing  Musculoskeletal/Extremities: No bony deformities.  Moves all 4 extremities without difficulty.  Neurological: Alert.  No focal deficits.  Endo: No pitting edema  Skin: No visible rash.  Psychiatric: Pleasant.     Emergency Department Course   Emergency Department Course & Assessments:     Interventions:  Medications - No data to display     Assessments/Consultations/Discussion of Management or Tests:  ED Course as of 12/17/23 1808   Sun Dec 17, 2023   1800 I obtained history and examined the patient as noted above.      Social Determinants of Health affecting care:  None      Disposition:  The patient was discharged to home.     Impression & Plan    Medical Decision Making:  Patient is a 23-year-old female presenting with [facility] who was concerned about an episode of nausea and vomiting from 8 AM to 930 this morning but that since resolved and the fact that her stomach feels cold.  On chart review she has been seen 20 times this month with today being the 17th of the month.  She states that she feels better and just wants to go home.  This is often when she does that she comes here states for a while and just wants to go home.  I do not see any acute process with her here.  Her vitals are stable.  She is longer nauseated or vomiting.  She is denying any pain.    Diagnosis:    ICD-10-CM    1. Nausea  R11.0          Discharge Medications:  New Prescriptions    No medications on file      Scribe Disclosure:  PHILLY MCGINNIS, am serving as a scribe at 6:05 PM on 12/17/2023 to document services personally performed by Randall Hayes MD based on my observations and the provider's statements to me.    12/17/2023   Randall Hayes MD Walters, Brent Aaron, MD  12/17/23 5621

## 2023-12-17 NOTE — ED TRIAGE NOTES
Headache, nausea, and vomiting.  Patient seen here the last 3 days.  Also says her anxiety is increased.

## 2023-12-18 NOTE — ED NOTES
Jey ordered for pt to go back to group home as she does not have a ride from either her sister or her group home.

## 2023-12-19 ENCOUNTER — NURSE TRIAGE (OUTPATIENT)
Dept: NURSING | Facility: CLINIC | Age: 23
End: 2023-12-19
Payer: MEDICARE

## 2023-12-19 ENCOUNTER — HOSPITAL ENCOUNTER (EMERGENCY)
Facility: CLINIC | Age: 23
Discharge: HOME | End: 2023-12-19
Attending: EMERGENCY MEDICINE | Admitting: EMERGENCY MEDICINE
Payer: MEDICARE

## 2023-12-19 VITALS
OXYGEN SATURATION: 98 % | TEMPERATURE: 98.1 F | HEART RATE: 85 BPM | SYSTOLIC BLOOD PRESSURE: 141 MMHG | RESPIRATION RATE: 20 BRPM | DIASTOLIC BLOOD PRESSURE: 80 MMHG

## 2023-12-19 DIAGNOSIS — R42 LIGHTHEADEDNESS: ICD-10-CM

## 2023-12-19 LAB
ANION GAP SERPL CALCULATED.3IONS-SCNC: 10 MMOL/L (ref 7–15)
ATRIAL RATE - MUSE: 66 BPM
BASOPHILS # BLD AUTO: 0 10E3/UL (ref 0–0.2)
BASOPHILS NFR BLD AUTO: 0 %
BUN SERPL-MCNC: 9.3 MG/DL (ref 6–20)
CALCIUM SERPL-MCNC: 9.1 MG/DL (ref 8.6–10)
CHLORIDE SERPL-SCNC: 108 MMOL/L (ref 98–107)
CREAT SERPL-MCNC: 0.8 MG/DL (ref 0.51–0.95)
DEPRECATED HCO3 PLAS-SCNC: 24 MMOL/L (ref 22–29)
DIASTOLIC BLOOD PRESSURE - MUSE: NORMAL MMHG
EGFRCR SERPLBLD CKD-EPI 2021: >90 ML/MIN/1.73M2
EOSINOPHIL # BLD AUTO: 0.2 10E3/UL (ref 0–0.7)
EOSINOPHIL NFR BLD AUTO: 3 %
ERYTHROCYTE [DISTWIDTH] IN BLOOD BY AUTOMATED COUNT: 13.2 % (ref 10–15)
GLUCOSE SERPL-MCNC: 86 MG/DL (ref 70–99)
HCT VFR BLD AUTO: 36.5 % (ref 35–47)
HGB BLD-MCNC: 12 G/DL (ref 11.7–15.7)
IMM GRANULOCYTES # BLD: 0 10E3/UL
IMM GRANULOCYTES NFR BLD: 0 %
INTERPRETATION ECG - MUSE: NORMAL
LYMPHOCYTES # BLD AUTO: 2 10E3/UL (ref 0.8–5.3)
LYMPHOCYTES NFR BLD AUTO: 30 %
MCH RBC QN AUTO: 27.6 PG (ref 26.5–33)
MCHC RBC AUTO-ENTMCNC: 32.9 G/DL (ref 31.5–36.5)
MCV RBC AUTO: 84 FL (ref 78–100)
MONOCYTES # BLD AUTO: 0.4 10E3/UL (ref 0–1.3)
MONOCYTES NFR BLD AUTO: 5 %
NEUTROPHILS # BLD AUTO: 4 10E3/UL (ref 1.6–8.3)
NEUTROPHILS NFR BLD AUTO: 62 %
NRBC # BLD AUTO: 0 10E3/UL
NRBC BLD AUTO-RTO: 0 /100
P AXIS - MUSE: 46 DEGREES
PLATELET # BLD AUTO: 232 10E3/UL (ref 150–450)
POTASSIUM SERPL-SCNC: 3.9 MMOL/L (ref 3.4–5.3)
PR INTERVAL - MUSE: 186 MS
QRS DURATION - MUSE: 80 MS
QT - MUSE: 386 MS
QTC - MUSE: 404 MS
R AXIS - MUSE: 13 DEGREES
RBC # BLD AUTO: 4.35 10E6/UL (ref 3.8–5.2)
SODIUM SERPL-SCNC: 142 MMOL/L (ref 135–145)
SYSTOLIC BLOOD PRESSURE - MUSE: NORMAL MMHG
T AXIS - MUSE: 10 DEGREES
VENTRICULAR RATE- MUSE: 66 BPM
WBC # BLD AUTO: 6.7 10E3/UL (ref 4–11)

## 2023-12-19 PROCEDURE — 93005 ELECTROCARDIOGRAM TRACING: CPT

## 2023-12-19 PROCEDURE — 80048 BASIC METABOLIC PNL TOTAL CA: CPT | Performed by: EMERGENCY MEDICINE

## 2023-12-19 PROCEDURE — 85025 COMPLETE CBC W/AUTO DIFF WBC: CPT | Performed by: EMERGENCY MEDICINE

## 2023-12-19 PROCEDURE — 99284 EMERGENCY DEPT VISIT MOD MDM: CPT

## 2023-12-19 PROCEDURE — 36415 COLL VENOUS BLD VENIPUNCTURE: CPT | Performed by: EMERGENCY MEDICINE

## 2023-12-19 PROCEDURE — 85004 AUTOMATED DIFF WBC COUNT: CPT | Performed by: EMERGENCY MEDICINE

## 2023-12-19 ASSESSMENT — ACTIVITIES OF DAILY LIVING (ADL): ADLS_ACUITY_SCORE: 37

## 2023-12-19 NOTE — ED PROVIDER NOTES
History     Chief Complaint:  Dizziness       HPI   Sadaf Ross is a 23 year old female who presents with nonspecific dizziness.  She was seen yesterday at an outside hospital ER for similar symptoms, as well as again this morning, and made it back to her group home around 8 AM, and states that she did not feel well and decided to come to this emergency room.  She has a care plan here that states she does occasionally seem to have a possible secondary reason for coming to the ER, although when I speak to her she is quite clear that she actually feels comfortable going back to the group home if her workup is negative.  She appears reassured that her blood work is okay and is asking to have her IV taken out.      She states at no point did she actually faint or fall, simply felt dizzy, and also was diagnosed with a UTI recently and has not started antibiotics for that, but she tells me that the prescription should be arriving back at her group home tonight.      Independent Historian:   No    Review of External Notes: Yes I read the note from the [facility] ER from earlier today.      Allergies:  Penicillins     Medications:    acetaminophen (TYLENOL) 325 MG tablet  albuterol (PROAIR HFA/PROVENTIL HFA/VENTOLIN HFA) 108 (90 Base) MCG/ACT inhaler  ARIPiprazole lauroxil ER (ARISTADA) 882 MG/3.2ML intra-muscular  bisacodyl (DULCOLAX) 5 MG EC tablet  calcium carbonate (TUMS) 500 MG chewable tablet  cyclobenzaprine (FLEXERIL) 10 MG tablet  dicyclomine (BENTYL) 20 MG tablet  docusate sodium (COLACE) 50 MG capsule  famotidine (PEPCID) 20 MG tablet  FLUoxetine (PROZAC) 40 MG capsule  hydrocortisone, Perianal, (HYDROCORTISONE) 2.5 % cream  hydrOXYzine (ATARAX) 50 MG tablet  ibuprofen (ADVIL/MOTRIN) 200 MG tablet  LORazepam (ATIVAN) 0.5 MG tablet  mupirocin (BACTROBAN) 2 % external ointment  OLANZapine zydis (ZYPREXA) 5 MG ODT  ondansetron (ZOFRAN) 4 MG tablet  ondansetron (ZOFRAN) 4 MG tablet  pantoprazole  (PROTONIX) 40 MG EC tablet  polyethylene glycol (MIRALAX) 17 GM/Dose powder  promethazine (PHENERGAN) 12.5 MG tablet  sennosides (SENOKOT) 8.6 MG tablet  traZODone (DESYREL) 50 MG tablet  Vitamin D, Cholecalciferol, 25 MCG (1000 UT) TABS        Past Medical History:    Past Medical History:   Diagnosis Date    ADHD (attention deficit hyperactivity disorder)     Bipolar 1 disorder (H)     Borderline personality disorder (H)     Depression     Depressive disorder     Intellectual disability     Obesity     Syncope        Past Surgical History:    Past Surgical History:   Procedure Laterality Date    APPENDECTOMY      APPENDECTOMY          Family History:    family history includes Cancer in her paternal grandfather; Diabetes Type 1 in her father.    Social History:   reports that she has been smoking cigarettes. She has a 15.00 pack-year smoking history. She has never used smokeless tobacco. She reports that she does not drink alcohol and does not use drugs.  PCP: [facility]     Physical Exam   Patient Vitals for the past 24 hrs:   BP Temp Temp src Pulse Resp SpO2   12/19/23 1608 (!) 141/80 -- -- 85 20 98 %   12/19/23 1427 136/61 98.1  F (36.7  C) Temporal 89 16 98 %        Physical Exam  Vitals: reviewed by me  General: Pt seen on [facility], pleasant, cooperative, and alert to conversation  Eyes: Tracking well, clear conjunctiva BL  ENT: MMM, midline trachea.   Lungs: No tachypnea, no accessory muscle use. No respiratory distress.   CV: Rate as above  MSK: no joint effusion.  No evidence of trauma  Skin: No rash  Neuro: Clear speech and no facial droop.  Ambulatory up and around the hallway in the ER.  Psych: Not RIS, no e/o AH/VH        Emergency Department Course     ECG results from 12/19/23   EKG 12-lead, tracing only     Value    Systolic Blood Pressure     Diastolic Blood Pressure     Ventricular Rate 66    Atrial Rate 66    GA Interval 186    QRS Duration 80        QTc 404    P Axis 46    R AXIS  13    T Axis 10    Interpretation ECG      Sinus rhythm  Cannot rule out Anterior infarct , age undetermined  Abnormal ECG  When compared with ECG of 15-DEC-2023 10:44,  QT has shortened  Confirmed by GENERATED REPORT, COMPUTER ([id number]),  Tori Rodríguez ([id number]) on 12/19/2023 4:51:10 PM         Laboratory:  Labs Ordered and Resulted from Time of ED Arrival to Time of ED Departure   BASIC METABOLIC PANEL - Abnormal       Result Value    Sodium 142      Potassium 3.9      Chloride 108 (*)     Carbon Dioxide (CO2) 24      Anion Gap 10      Urea Nitrogen 9.3      Creatinine 0.80      GFR Estimate >90      Calcium 9.1      Glucose 86     CBC WITH PLATELETS AND DIFFERENTIAL    WBC Count 6.7      RBC Count 4.35      Hemoglobin 12.0      Hematocrit 36.5      MCV 84      MCH 27.6      MCHC 32.9      RDW 13.2      Platelet Count 232      % Neutrophils 62      % Lymphocytes 30      % Monocytes 5      % Eosinophils 3      % Basophils 0      % Immature Granulocytes 0      NRBCs per 100 WBC 0      Absolute Neutrophils 4.0      Absolute Lymphocytes 2.0      Absolute Monocytes 0.4      Absolute Eosinophils 0.2      Absolute Basophils 0.0      Absolute Immature Granulocytes 0.0      Absolute NRBCs 0.0            Emergency Department Course & Assessments:        Interventions:  Medications - No data to display         Social Determinants of Health affecting care:   Stress/Adjustment Disorders      Disposition:  The patient was discharged to home.     Impression & Plan        Medical Decision Making:  This is a very pleasant 23-year-old female who presents emergency room with what appears to be a brief episode of lightheadedness.  She denies vertigo, her labs are stable, this appears to be a repeat complaint for her, and her EKG is also quite reassuring.  It does sound like part of this may be due to a UTI, she has been given antibiotics and has not yet been able to take them, but tells me they will be coming to her facility  tonight.  He also sounds like there may have been a history of malingering in the past, though I do not detect that here today as the patient feels very comfortable that her workup was negative and feels comfortable going home.  Will plan for discharge home, red flags for when to come back to the ER were discussed in detail, patient is up and about and quite ambulatory.  She has demonstrated that she can come back to the ER if symptoms recur or if anything gets worse.    Diagnosis:    ICD-10-CM    1. Lightheadedness  R42            Discharge Medications:  Discharge Medication List as of 12/19/2023  4:56 PM             12/19/2023   Morgan Chappell*        Morgan Chappell MD  12/19/23 2142

## 2023-12-19 NOTE — TELEPHONE ENCOUNTER
Patient calling on her way to the ED and letting us know she will be there in 3 minutes. Advised patient that she does not need to give notice before going to the emergency department.       Reason for Disposition   Health information question, no triage required and triager able to answer question    Protocols used: Information Only Call - No Triage-A-[place]      Nany Caputo RN on 12/19/2023 at 1:49 PM

## 2023-12-19 NOTE — ED TRIAGE NOTES
Pt recently at [place] and prescribed abx for uti pt states she has not taken it yet due to pharmacy not bringing it. Pt states she is still  having pain with urination and joint in her body hurt

## 2023-12-19 NOTE — DISCHARGE INSTRUCTIONS
Please come back with any new symptoms or lightheadedness.  Please do take your medication that has been prescribed to you for your urine infection on your previous ER visit.

## 2023-12-29 ENCOUNTER — TRANSCRIBE ORDERS (OUTPATIENT)
Dept: URGENT CARE | Facility: CLINIC | Age: 23
End: 2023-12-29
Payer: MEDICARE

## 2023-12-29 DIAGNOSIS — M54.50 ACUTE LOW BACK PAIN WITH DISC SYMPTOMS, DURATION LESS THAN 6 WEEKS: Primary | ICD-10-CM

## 2023-12-29 DIAGNOSIS — M51.9 ACUTE LOW BACK PAIN WITH DISC SYMPTOMS, DURATION LESS THAN 6 WEEKS: Primary | ICD-10-CM

## 2023-12-30 ENCOUNTER — NURSE TRIAGE (OUTPATIENT)
Dept: NURSING | Facility: CLINIC | Age: 23
End: 2023-12-30
Payer: MEDICARE

## 2023-12-30 ENCOUNTER — HOSPITAL ENCOUNTER (EMERGENCY)
Facility: CLINIC | Age: 23
Discharge: HOME | End: 2023-12-30
Attending: EMERGENCY MEDICINE | Admitting: EMERGENCY MEDICINE
Payer: MEDICARE

## 2023-12-30 VITALS
DIASTOLIC BLOOD PRESSURE: 86 MMHG | TEMPERATURE: 99.5 F | RESPIRATION RATE: 16 BRPM | SYSTOLIC BLOOD PRESSURE: 135 MMHG | OXYGEN SATURATION: 96 % | HEART RATE: 110 BPM

## 2023-12-30 DIAGNOSIS — M54.9 BACK PAIN, UNSPECIFIED BACK LOCATION, UNSPECIFIED BACK PAIN LATERALITY, UNSPECIFIED CHRONICITY: ICD-10-CM

## 2023-12-30 PROCEDURE — 99283 EMERGENCY DEPT VISIT LOW MDM: CPT | Performed by: EMERGENCY MEDICINE

## 2023-12-30 PROCEDURE — 250N000013 HC RX MED GY IP 250 OP 250 PS 637: Performed by: EMERGENCY MEDICINE

## 2023-12-30 RX ORDER — OXYCODONE HYDROCHLORIDE 5 MG/1
5 TABLET ORAL ONCE
Status: COMPLETED | OUTPATIENT
Start: 2023-12-30 | End: 2023-12-30

## 2023-12-30 RX ORDER — LIDOCAINE 4 G/G
2 PATCH TOPICAL
Status: DISCONTINUED | OUTPATIENT
Start: 2023-12-30 | End: 2023-12-31 | Stop reason: HOSPADM

## 2023-12-30 RX ADMIN — OXYCODONE HYDROCHLORIDE 5 MG: 5 TABLET ORAL at 22:34

## 2023-12-30 RX ADMIN — LIDOCAINE 2 PATCH: 4 PATCH TOPICAL at 22:36

## 2023-12-30 ASSESSMENT — ACTIVITIES OF DAILY LIVING (ADL): ADLS_ACUITY_SCORE: 37

## 2023-12-31 ENCOUNTER — HOSPITAL ENCOUNTER (EMERGENCY)
Facility: CLINIC | Age: 23
Discharge: HOME | End: 2023-12-31
Attending: EMERGENCY MEDICINE | Admitting: EMERGENCY MEDICINE
Payer: MEDICARE

## 2023-12-31 VITALS
OXYGEN SATURATION: 100 % | DIASTOLIC BLOOD PRESSURE: 87 MMHG | RESPIRATION RATE: 16 BRPM | HEART RATE: 118 BPM | SYSTOLIC BLOOD PRESSURE: 156 MMHG | TEMPERATURE: 98.4 F

## 2023-12-31 DIAGNOSIS — G89.29 CHRONIC BILATERAL LOW BACK PAIN WITHOUT SCIATICA: ICD-10-CM

## 2023-12-31 DIAGNOSIS — M54.50 CHRONIC BILATERAL LOW BACK PAIN WITHOUT SCIATICA: ICD-10-CM

## 2023-12-31 PROCEDURE — 99284 EMERGENCY DEPT VISIT MOD MDM: CPT | Performed by: EMERGENCY MEDICINE

## 2023-12-31 PROCEDURE — 250N000013 HC RX MED GY IP 250 OP 250 PS 637: Performed by: EMERGENCY MEDICINE

## 2023-12-31 PROCEDURE — 99283 EMERGENCY DEPT VISIT LOW MDM: CPT | Performed by: EMERGENCY MEDICINE

## 2023-12-31 RX ORDER — IBUPROFEN 600 MG/1
600 TABLET, FILM COATED ORAL ONCE
Status: COMPLETED | OUTPATIENT
Start: 2023-12-31 | End: 2023-12-31

## 2023-12-31 RX ADMIN — IBUPROFEN 600 MG: 600 TABLET, FILM COATED ORAL at 18:49

## 2023-12-31 NOTE — ED NOTES
Pt given discharge paperwork and instructions. No questions or concerns. A&O x4. Ambulatory with steady gait. This RN spoke with , able to take pt back tonight. Okay with sending pt back via cab. This plan discussed with pt, pt agreeable. Cab called for pt.

## 2023-12-31 NOTE — ED PROVIDER NOTES
[facility] EMERGENCY DEPARTMENT ([facility])    12/30/23      ED PROVIDER NOTE      History     Chief Complaint   Patient presents with    Back Pain     Back pain for the last year, took Tylenol but did not help. Hospital gave her a lidocaine patch in the past and she wants that. Recent UTI.     The history is provided by the patient and medical records.     Sadaf Ross is a 23 year old female with PMH of ADHD, Bipolar disorder 1, Depression with SI, Borderline personality disorder who presents to the Emergency Department today due to low back pain. Patient reports her back pain started 1 year ago.  She was recently prescribed lidocaine patches which she has not yet received in the mail from her pharmacy.  No new injuries.  No fevers.  No nausea or vomiting.  Per chart review, patient has multiple recent visits since 12/20/23 with similar complaints. During the last visit yesterday, patient reported her pain had caused some intermittent passive SI. She was offered Toradol but refused because she would prefer to avoid being stuck with a needle. She was given Tylenol and lidocaine patches. She was comfortable for discharge.      MR Lumbar Spine w/o Contrast (12/26/23):  IMPRESSION:    1. Minimal degenerative findings most pronounced at L5-S1 as above without significant spinal canal or neural foraminal stenosis.   2. At L5-S1, small disc bulge with annular fissure and mild facet arthropathy.     Past Medical History  Past Medical History:   Diagnosis Date    ADHD (attention deficit hyperactivity disorder)     Bipolar 1 disorder (H)     Borderline personality disorder (H)     Depression     Depressive disorder     Intellectual disability     Obesity     Syncope      Past Surgical History:   Procedure Laterality Date    APPENDECTOMY      APPENDECTOMY       acetaminophen (TYLENOL) 325 MG tablet  albuterol (PROAIR HFA/PROVENTIL HFA/VENTOLIN HFA) 108 (90 Base) MCG/ACT inhaler  ARIPiprazole lauroxil ER  (ARISTADA) 882 MG/3.2ML intra-muscular  bisacodyl (DULCOLAX) 5 MG EC tablet  calcium carbonate (TUMS) 500 MG chewable tablet  cyclobenzaprine (FLEXERIL) 10 MG tablet  dicyclomine (BENTYL) 20 MG tablet  docusate sodium (COLACE) 50 MG capsule  famotidine (PEPCID) 20 MG tablet  FLUoxetine (PROZAC) 40 MG capsule  hydrocortisone, Perianal, (HYDROCORTISONE) 2.5 % cream  hydrOXYzine (ATARAX) 50 MG tablet  ibuprofen (ADVIL/MOTRIN) 200 MG tablet  LORazepam (ATIVAN) 0.5 MG tablet  mupirocin (BACTROBAN) 2 % external ointment  OLANZapine zydis (ZYPREXA) 5 MG ODT  ondansetron (ZOFRAN) 4 MG tablet  ondansetron (ZOFRAN) 4 MG tablet  pantoprazole (PROTONIX) 40 MG EC tablet  polyethylene glycol (MIRALAX) 17 GM/Dose powder  promethazine (PHENERGAN) 12.5 MG tablet  sennosides (SENOKOT) 8.6 MG tablet  traZODone (DESYREL) 50 MG tablet  Vitamin D, Cholecalciferol, 25 MCG (1000 UT) TABS      Allergies   Allergen Reactions    Penicillins Rash and Unknown     Family History  Family History   Problem Relation Age of Onset    Diabetes Type 1 Father     Cancer Paternal Grandfather      Social History   Social History     Tobacco Use    Smoking status: Every Day     Packs/day: 3.00     Years: 5.00     Additional pack years: 0.00     Total pack years: 15.00     Types: Cigarettes    Smokeless tobacco: Never   Substance Use Topics    Alcohol use: No    Drug use: No         A medically appropriate review of systems was performed with pertinent positives and negatives noted in the HPI, and all other systems negative.    Physical Exam   BP: 135/86  Pulse: 110  Temp: 99.5  F (37.5  C)  Resp: 16  SpO2: 96 %  Physical Exam  Constitutional:       General: She is not in acute distress.     Appearance: She is not diaphoretic.   HENT:      Head: Normocephalic and atraumatic.      Right Ear: External ear normal.      Left Ear: External ear normal.      Nose: Nose normal.   Eyes:      Extraocular Movements: Extraocular movements intact.       Conjunctiva/sclera: Conjunctivae normal.   Cardiovascular:      Rate and Rhythm: Normal rate and regular rhythm.      Heart sounds: Normal heart sounds.   Pulmonary:      Effort: Pulmonary effort is normal.   Abdominal:      General: There is no distension.      Palpations: Abdomen is soft.   Musculoskeletal:         General: No swelling or deformity.      Cervical back: Normal range of motion and neck supple.      Comments: Diffuse lower back tenderness   Skin:     General: Skin is warm and dry.   Neurological:      Mental Status: Mental status is at baseline.      Comments: Alert, oriented   Psychiatric:         Mood and Affect: Mood normal.         Behavior: Behavior normal.           ED Course, Procedures, & Data      Procedures                No results found for any visits on 12/30/23.  Medications   Lidocaine (LIDOCARE) 4 % Patch 2 patch (2 patches Transdermal $Patch/Med Applied 12/30/23 2236)   oxyCODONE (ROXICODONE) tablet 5 mg (5 mg Oral $Given 12/30/23 2234)     Labs Ordered and Resulted from Time of ED Arrival to Time of ED Departure - No data to display  No orders to display          Medical Decision Making  The patient's presentation is strongly suggestive of low complexity (an acute and uncomplicated illness or injury).    The patient's evaluation involved:  review of external note(s) from 3+ sources (Most recent H&P in addition to clinic and ED note)  review of 2 test result(s) ordered prior to this encounter (Most recent BMP and CBC)    The patient's management involved moderate risk (prescription drug management including medications given in the ED).    Assessment & Plan    23-year-old female presents to us with a chief complaint of back pain.  This is chronic for her.  No new injuries.  She was given oxycodone and a lidocaine patch.    I have reviewed the nursing notes. I have reviewed the findings, diagnosis, plan and need for follow up with the patient.    New Prescriptions    No medications on  file       Final diagnoses:   Back pain, unspecified back location, unspecified back pain laterality, unspecified chronicity   I[facility]ford, am serving as a trained medical scribe to document services personally performed by Richie Sinha DO based on the provider's statements to me on December 30, 2023.  This document has been checked and approved by the attending provider.    IRichie DO, was physically present and have reviewed and verified the accuracy of this note documented by [facility] medical scribe.      Richie Sinha DO  [facility] EMERGENCY DEPARTMENT  12/30/2023     Richie Sinha DO  12/30/23 7945

## 2023-12-31 NOTE — TELEPHONE ENCOUNTER
Nurse Triage SBAR    Is this a 2nd Level Triage? NO    Situation: Back Pain    Background: :Patient reports lower back pain for a year. Reports MRI recently showed bulged disc.    Assessment: Patient reports onset of severe lower back pain 5 hours ago despite pain medications. She reports unable to sleep and feels nauseated. Denies incontinence, unable to bear weight, abdominal pain, or urine retention.    Protocol Recommended Disposition:   According to the protocol, patient should see provider within 4 hours,(or PCP triage) no [facility] PCP, advised to go to ED.  Care advice given. Patient verbalizes understanding and agrees with plan of care.     Chloe Piper RN  12/30/23 9:03 PM  [facility] Nurse Advisor      Reason for Disposition   [1] SEVERE back pain (e.g., excruciating, unable to do any normal activities) AND [2] not improved 2 hours after pain medicine    Additional Information   Negative: Passed out (i.e., lost consciousness, collapsed and was not responding)   Negative: Shock suspected (e.g., cold/pale/clammy skin, too weak to stand, low BP, rapid pulse)   Negative: Sounds like a life-threatening emergency to the triager   Negative: Major injury to the back (e.g., MVA, fall > 10 feet or 3 meters, penetrating injury, etc.)   Negative: Followed a tailbone injury   Negative: [1] Pain in the upper back over the ribs (rib cage) AND [2] radiates (travels, goes) into chest   Negative: [1] Pain in the upper back over the ribs (rib cage) AND [2] worsened by coughing (or clearly increases with breathing)   Negative: Back pain during pregnancy   Negative: Pain mainly in flank (i.e., in the side, over the lower ribs or just below the ribs)   Negative: [1] SEVERE back pain (e.g., excruciating) AND [2] sudden onset AND [3] age > 60 years   Negative: [1] Unable to urinate (or only a few drops) > 4 hours AND [2] bladder feels very full (e.g., palpable bladder or strong urge to urinate)   Negative: [1] Loss of  bladder or bowel control (urine or bowel incontinence; wetting self, leaking stool) AND [2] new-onset   Negative: Numbness in groin or rectal area (i.e., loss of sensation)   Negative: [1] SEVERE abdominal pain AND [2] present > 1 hour   Negative: [1] Abdominal pain AND [2] age > 60 years   Negative: Weakness of a leg or foot (e.g., unable to bear weight, dragging foot)   Negative: Unable to walk   Negative: Patient sounds very sick or weak to the triager    Protocols used: Back Pain-A-AH

## 2023-12-31 NOTE — ED NOTES
Bed: POLLOSWETA  Expected date:   Expected time:   Means of arrival:   Comments:  Fadi 731  23 F  Chronic back pain

## 2023-12-31 NOTE — ED TRIAGE NOTES
Back pain for the last year, took Tylenol but did not help. Hospital gave her a lidocaine patch in the past and she wants that. Recent UTI.     Triage Assessment (Adult)       Row Name 12/30/23 9458          Triage Assessment    Airway WDL WDL        Respiratory WDL    Respiratory WDL WDL        Skin Circulation/Temperature WDL    Skin Circulation/Temperature WDL WDL        Cardiac WDL    Cardiac WDL WDL        Peripheral/Neurovascular WDL    Peripheral Neurovascular WDL WDL        Cognitive/Neuro/Behavioral WDL    Cognitive/Neuro/Behavioral WDL WDL

## 2024-01-01 NOTE — ED PROVIDER NOTES
[facility] EMERGENCY DEPARTMENT ([facility])    12/31/23      ED PROVIDER NOTE    History     Chief Complaint   Patient presents with    Back Pain     HPI  Sadaf Ross is a 23 year old female with PMH of Bipolar 1 disorder, Depression with SI , ADHD, Borderline personality disorder, chronic low back pain, and frequent ED presentations from group home with ED care plan who presents to the Emergency Department today due to low back pain. Patient has been seen for this multiple times in the past week. Most recent evaluation was yesterday. Patient states that the pain is the same as yesterday. She notes that the lidocaine patch given yesterday did not work for her. She reports taking a lot of pain medications at home but does not recall which ones. Patient notes worsened back pain and loss of balance when she gets up from seated position but denies weakness or foot drop. Patient denies numbness or loss of control of her bowel or bladder. Denies new injury.     Per chart review, patient has multiple recent ED visits with similar presentations. In the last visit yesterday, she was given oxycodone and a lidocaine patch before discharge.     MR Lumbar Spine w/o Contrast (12/26/23):  IMPRESSION:    1. Minimal degenerative findings most pronounced at L5-S1 as above without significant spinal canal or neural foraminal stenosis.   2. At L5-S1, small disc bulge with annular fissure and mild facet arthropathy.     Past Medical History  Past Medical History:   Diagnosis Date    ADHD (attention deficit hyperactivity disorder)     Bipolar 1 disorder (H)     Borderline personality disorder (H)     Depression     Depressive disorder     Intellectual disability     Obesity     Syncope      Past Surgical History:   Procedure Laterality Date    APPENDECTOMY      APPENDECTOMY       acetaminophen (TYLENOL) 325 MG tablet  albuterol (PROAIR HFA/PROVENTIL HFA/VENTOLIN HFA) 108 (90 Base) MCG/ACT inhaler  ARIPiprazole lauroxil ER  (ARISTADA) 882 MG/3.2ML intra-muscular  bisacodyl (DULCOLAX) 5 MG EC tablet  calcium carbonate (TUMS) 500 MG chewable tablet  cyclobenzaprine (FLEXERIL) 10 MG tablet  dicyclomine (BENTYL) 20 MG tablet  docusate sodium (COLACE) 50 MG capsule  famotidine (PEPCID) 20 MG tablet  FLUoxetine (PROZAC) 40 MG capsule  hydrocortisone, Perianal, (HYDROCORTISONE) 2.5 % cream  hydrOXYzine (ATARAX) 50 MG tablet  ibuprofen (ADVIL/MOTRIN) 200 MG tablet  LORazepam (ATIVAN) 0.5 MG tablet  mupirocin (BACTROBAN) 2 % external ointment  OLANZapine zydis (ZYPREXA) 5 MG ODT  ondansetron (ZOFRAN) 4 MG tablet  ondansetron (ZOFRAN) 4 MG tablet  pantoprazole (PROTONIX) 40 MG EC tablet  polyethylene glycol (MIRALAX) 17 GM/Dose powder  promethazine (PHENERGAN) 12.5 MG tablet  sennosides (SENOKOT) 8.6 MG tablet  traZODone (DESYREL) 50 MG tablet  Vitamin D, Cholecalciferol, 25 MCG (1000 UT) TABS      Allergies   Allergen Reactions    Penicillins Rash and Unknown     Family History  Family History   Problem Relation Age of Onset    Diabetes Type 1 Father     Cancer Paternal Grandfather      Social History   Social History     Tobacco Use    Smoking status: Every Day     Packs/day: 3.00     Years: 5.00     Additional pack years: 0.00     Total pack years: 15.00     Types: Cigarettes    Smokeless tobacco: Never   Substance Use Topics    Alcohol use: No    Drug use: No         A medically appropriate review of systems was performed with pertinent positives and negatives noted in the HPI, and all other systems negative.    Physical Exam   BP: (!) 156/87  Pulse: 118  Temp: 98.4  F (36.9  C)  Resp: 16  SpO2: 100 %  Physical Exam  Vitals and nursing note reviewed.   Constitutional:       General: She is not in acute distress.     Appearance: Normal appearance. She is well-developed. She is obese. She is not ill-appearing or diaphoretic.   HENT:      Head: Normocephalic and atraumatic.      Nose: Nose normal.      Mouth/Throat:      Mouth: Mucous membranes  are moist.   Eyes:      General: No scleral icterus.     Conjunctiva/sclera: Conjunctivae normal.   Cardiovascular:      Rate and Rhythm: Normal rate.   Pulmonary:      Effort: Pulmonary effort is normal. No respiratory distress.      Breath sounds: No stridor.   Abdominal:      General: There is no distension.   Musculoskeletal:         General: No deformity or signs of injury. Normal range of motion.      Cervical back: Normal range of motion and neck supple. No rigidity.      Lumbar back: Tenderness present. Normal range of motion.        Back:       Comments: Mild tenderness over entire low back region   Skin:     General: Skin is warm and dry.      Coloration: Skin is not jaundiced or pale.      Findings: No rash.   Neurological:      General: No focal deficit present.      Mental Status: She is alert and oriented to person, place, and time.   Psychiatric:         Mood and Affect: Mood normal.         Behavior: Behavior normal.           ED Course, Procedures, & Data      Procedures          Mental Health Risk Assessment        PSS-3      Date and Time Over the past 2 weeks have you felt down, depressed, or hopeless? Over the past 2 weeks have you had thoughts of killing yourself? Have you ever attempted to kill yourself? When did this last happen? User   12/31/23 1816 yes no yes within the last month (but not today) MM                Item Assessment   Suicidal Ideation None   Plan NA   Intent NA   Suicidal or self-harm behaviors NA   Risk Factors NA   Protective Factors NA            No results found for any visits on 12/31/23.  Medications   ibuprofen (ADVIL/MOTRIN) tablet 600 mg (600 mg Oral $Given 12/31/23 1847)     Labs Ordered and Resulted from Time of ED Arrival to Time of ED Departure - No data to display  No orders to display          Critical care was not performed.     Medical Decision Making  The patient's presentation was of moderate complexity (a chronic illness mild to moderate exacerbation,  progression, or side effect of treatment).    The patient's evaluation involved:  review of external note(s) from 3+ sources (see separate area of note for details)  review of 2 test result(s) ordered prior to this encounter (see separate area of note for details)    The patient's management necessitated moderate risk (prescription drug management including medications given in the ED) and moderate risk (limitations due to social determinants of health (impulsive behavior, intellectual disability, chronic pain, lives in a group home)).    Assessment & Plan    aSdaf Ross is a 23 year old female with PMH of Bipolar 1 disorder, Depression with SI , ADHD, Borderline personality disorder, chronic low back pain who presents to the Emergency Department today due to low back pain.    Ddx: chronic back pain, frequent ED utilization, malingering    Patient's care plan as documented the patient frequently visits the emergency department for secondary gain trying to escape from her group home temporarily.  They recommend returning the patient to her group home as soon as possible if there are no acute mental health or medical conditions.  Patient has been seen many times in the past week for her chronic back pain.  She responds to over-the-counter medications and is returned to her group home.  The patient denies new injury or worsening symptoms today.  She was given ibuprofen and advised to use over-the-counter topical pain medications for ongoing pain control at her group home.  I advised the patient that she does not need to return for reevaluation in the emergency department if her symptoms do not change.  She should come back if she develops new weakness, loss of control of her bowel or bladder, numbness, or worsening pain.  Patient verbalized understanding.    I have reviewed the nursing notes. I have reviewed the findings, diagnosis, plan and need for follow up with the patient.    Discharge Medication List as of  12/31/2023  6:43 PM          Final diagnoses:   Chronic bilateral low back pain without sciatica       Marcy CANO [facility] EMERGENCY DEPARTMENT  12/31/2023     Marcy Gan MD  12/31/23 3710

## 2024-01-01 NOTE — ED TRIAGE NOTES
Triage Assessment (Adult)       Row Name 12/31/23 1816          Respiratory WDL    Respiratory WDL WDL        Skin Circulation/Temperature WDL    Skin Circulation/Temperature WDL WDL        Cardiac WDL    Cardiac WDL X        Peripheral/Neurovascular WDL    Peripheral Neurovascular WDL WDL

## 2024-01-01 NOTE — ED TRIAGE NOTES
Brought in by EMS for back pain pt working with [facility] for MRI which she has an appt for on Tuesday.

## 2024-01-01 NOTE — DISCHARGE INSTRUCTIONS
Take 600 mg ibuprofen every 8 hours, for pain and inflammation.  Take this on schedule, for approximately 1 week or until resolution of symptoms.    Take this medication with food to prevent stomach upset.  If you taking a chronic antacid medication, make sure to take this while you are taking this medication.    You may use  topical (skin creams/ointments/patches) medications to treat pain. These are available over the counter. Example: Icy-Hot, Bengay, Tiger Blythe, Salonpas      Rest, avoid repetitive motion, and lifting over 5 pounds with the effected extremity.  May use ice or heating pad to help with the pain.    Follow-up with her primary care doctor in 1-2 weeks if your symptoms have not resolved. You may benefit from a referral to physical therapy.     Return to the emergency department if you develop worsening pain, weakness, numbness or tingling, bowel or bladder changes.

## 2024-01-03 ENCOUNTER — HOSPITAL ENCOUNTER (EMERGENCY)
Facility: CLINIC | Age: 24
End: 2024-01-03
Payer: MEDICARE

## 2024-01-16 ENCOUNTER — HOSPITAL ENCOUNTER (EMERGENCY)
Facility: CLINIC | Age: 24
Discharge: HOME | End: 2024-01-16
Admitting: EMERGENCY MEDICINE
Payer: MEDICARE

## 2024-01-16 VITALS
TEMPERATURE: 100.2 F | DIASTOLIC BLOOD PRESSURE: 77 MMHG | SYSTOLIC BLOOD PRESSURE: 158 MMHG | WEIGHT: 254 LBS | OXYGEN SATURATION: 99 % | RESPIRATION RATE: 20 BRPM | HEART RATE: 112 BPM | HEIGHT: 63 IN | BODY MASS INDEX: 45 KG/M2

## 2024-01-16 DIAGNOSIS — R45.851 SUICIDAL IDEATION: ICD-10-CM

## 2024-01-16 DIAGNOSIS — Z86.59 HISTORY OF PSYCHIATRIC DISORDER: ICD-10-CM

## 2024-01-16 PROCEDURE — 99282 EMERGENCY DEPT VISIT SF MDM: CPT

## 2024-01-16 NOTE — ED TRIAGE NOTES
Pt took cab here from group home. States she is suicidal with a plan to bite herself to death. States chronic back pain. States med compliant. Denies drugs or ETOH.     Triage Assessment (Adult)       Row Name 01/16/24 1408          Triage Assessment    Airway WDL WDL        Respiratory WDL    Respiratory WDL WDL        Skin Circulation/Temperature WDL    Skin Circulation/Temperature WDL WDL        Cardiac WDL    Cardiac WDL WDL        Peripheral/Neurovascular WDL    Peripheral Neurovascular WDL WDL        Cognitive/Neuro/Behavioral WDL    Cognitive/Neuro/Behavioral WDL WDL

## 2024-01-16 NOTE — ED PROVIDER NOTES
History   Chief Complaint:  Suicidal ideation    HPI   History supplemented by electronic chart review    Sadaf Ross is a 23 year old female who presents for evaluation of thoughts of self-harm.  The patient states that for many weeks she has been having thoughts of hurting herself, she would plan to do so by biting.  She has a history of borderline personality disorder as well as depression and bipolar documented in her chart.  She states that there are no particular triggers.  She states that yesterday she got stranded in [place] and had to call for a ride, but  picked her up and that crisis was resolved.  She denies drugs or alcohol.  She denies any fevers, cough, vomiting, or new pains.  She reports chronic lower back pain and states that she went to urgent care earlier today for her lower back pain and was prescribed ibuprofen.  She is not sure what she would like to have happen today in the emergency department.  She is unable to describe any ways in which today symptoms are different than the way she normally feels.  She states that she has received the Depo-Provera, she does not normally get periods.  She has not actually done anything to harm herself lately.  She resides in a group home and plans to go back there.    Review of External Notes: I reviewed the patient's care plan in [facility], she is a frequent utilizer of the emergency department and has had many prior similar presentations.  She was recently at the [facility] emergency department for suicidal ideation on January 11 and was discharged.    Medications:    acetaminophen (TYLENOL) 325 MG tablet  albuterol (PROAIR HFA/PROVENTIL HFA/VENTOLIN HFA) 108 (90 Base) MCG/ACT inhaler  ARIPiprazole lauroxil ER (ARISTADA) 882 MG/3.2ML intra-muscular  bisacodyl (DULCOLAX) 5 MG EC tablet  calcium carbonate (TUMS) 500 MG chewable tablet  cyclobenzaprine (FLEXERIL) 10 MG tablet  dicyclomine (BENTYL) 20 MG tablet  docusate sodium (COLACE) 50 MG  "capsule  famotidine (PEPCID) 20 MG tablet  FLUoxetine (PROZAC) 40 MG capsule  hydrocortisone, Perianal, (HYDROCORTISONE) 2.5 % cream  hydrOXYzine (ATARAX) 50 MG tablet  ibuprofen (ADVIL/MOTRIN) 200 MG tablet  LORazepam (ATIVAN) 0.5 MG tablet  mupirocin (BACTROBAN) 2 % external ointment  OLANZapine zydis (ZYPREXA) 5 MG ODT  ondansetron (ZOFRAN) 4 MG tablet  ondansetron (ZOFRAN) 4 MG tablet  pantoprazole (PROTONIX) 40 MG EC tablet  polyethylene glycol (MIRALAX) 17 GM/Dose powder  promethazine (PHENERGAN) 12.5 MG tablet  sennosides (SENOKOT) 8.6 MG tablet  traZODone (DESYREL) 50 MG tablet  Vitamin D, Cholecalciferol, 25 MCG (1000 UT) TABS        Past Medical History:    Past Medical History:   Diagnosis Date    ADHD (attention deficit hyperactivity disorder)     Bipolar 1 disorder (H)     Borderline personality disorder (H)     Depression     Depressive disorder     Intellectual disability     Obesity     Syncope        Patient Active Problem List    Diagnosis Date Noted    Morbid obesity (H) 08/30/2022     Priority: Medium    Intellectual disability 07/12/2021     Priority: Medium    Bipolar affective disorder, currently depressed, moderate (H) 07/12/2021     Priority: Medium    Borderline personality disorder (H) 07/12/2021     Priority: Medium    Suicidal ideations 07/10/2021     Priority: Medium    Suicidal ideation 06/27/2017     Priority: Medium    MENTAL HEALTH 04/26/2013     Priority: Medium        Physical Exam   Patient Vitals for the past 24 hrs:   BP Temp Temp src Pulse Resp SpO2 Height Weight   01/16/24 1406 (!) 158/77 100.2  F (37.9  C) Temporal 112 20 99 % 1.6 m (5' 3\") 115.2 kg (254 lb)      Physical Exam  General: Nontoxic-appearing woman, sitting upright  HENT: mucous membranes moist  Eyes: PERRL without nystagmus  CV: extremities well perfused, regular rhythm  Resp: normal effort, speaks in full phrases, no stridor, no cough observed  GI: abdomen soft and nontender, no guarding  MSK: no bony " "tenderness   Skin: appropriately warm and dry  Neuro: alert, clear speech, oriented, normal tone in extremities, ambulatory with steady gait  Psych: cooperative with initial interview, reports thoughts of self harm, no evidence of hallucinations    Emergency Department Course   Emergency Department Course:  Reviewed:  I reviewed nursing notes, vitals, and past medical history    Assessments/Consultations/Discussion of Management or Tests :  I obtained history and examined the patient as noted above.   ED Course as of 01/16/24 1415   Tue Jan 16, 2024   1413 I rechecked patient.  She abruptly eloped, said \"you guys don't do shit for me!\"  She walked to [facility] with steady gait, calling her group home for a ride.     Social Determinants of Health affecting care:   Healthcare Access/Compliance, Transportation, and Stress/Adjustment Disorders    Disposition:  Eloped    Impression & Plan    Medical Decision Making:  She presents with thoughts of self-harm, in the setting of chronic psychiatric disorders.  I reviewed her past records, it appears that today's described symptoms are compatible with the patient's longstanding thoughts of self-harm.  The patient does not demonstrate any evidence of intoxication nor withdrawal state or other medical process.  I note that the patient's temperature is somewhat elevated, this was a temporal measurement taken just at the patient removed her winter hat.  Blood pressure is satisfactory.  No respiratory distress.  Normal neurologic exam including and steady gait.  After reviewing her care plan, I went back to talk with the patient again.  I asked the patient to further elaborate on her symptoms, at which point she became abruptly upset, stormed out of the room and expressed her intention to call her group home to get a ride back.  I did specifically offer to have the patient assessed by the mental health team, which she declined.  I did not feel that she met criteria to be held against " her will given the chronicity of her symptoms, will acknowledging that the likelihood of ongoing symptoms and of actual self-harm in both the short and long-term remains increased.  The patient is certainly encouraged to return here for crisis or even if she simply changes her mind at any time.  She did demonstrate decision-making capacity.  I did not identify or suspect any acutely dangerous medical, traumatic, or psychiatric condition that would warrant obligatory further care at this time.  The patient did walk out of the emergency department under her own power before I had the opportunity to fully discuss any discharge plan and specific ongoing recommendations with her.    Diagnosis:    ICD-10-CM    1. Suicidal ideation  R45.851     chronic      2. History of psychiatric disorder  Z86.59          1/16/2024   MD Shubham Ken Jeffrey Alan, MD  01/16/24 1271

## 2024-01-16 NOTE — ED NOTES
MD went in to speak with patient about the reason for her visit today, and how her condition has changed today. Patient became agitated and stormed out of the room. Patient not on hold. Pt has care plan.

## 2024-01-31 ENCOUNTER — HOSPITAL ENCOUNTER (EMERGENCY)
Facility: CLINIC | Age: 24
Discharge: NURSING, REHAB OR LONG-TERM CARE | End: 2024-01-31
Attending: FAMILY MEDICINE | Admitting: FAMILY MEDICINE
Payer: MEDICARE

## 2024-01-31 VITALS
WEIGHT: 240 LBS | OXYGEN SATURATION: 99 % | TEMPERATURE: 99 F | RESPIRATION RATE: 16 BRPM | DIASTOLIC BLOOD PRESSURE: 81 MMHG | BODY MASS INDEX: 42.51 KG/M2 | SYSTOLIC BLOOD PRESSURE: 139 MMHG | HEART RATE: 115 BPM

## 2024-01-31 DIAGNOSIS — F79 INTELLECTUAL DISABILITY: Chronic | ICD-10-CM

## 2024-01-31 DIAGNOSIS — F60.3 BORDERLINE PERSONALITY DISORDER (H): Chronic | ICD-10-CM

## 2024-01-31 PROCEDURE — 250N000013 HC RX MED GY IP 250 OP 250 PS 637: Performed by: FAMILY MEDICINE

## 2024-01-31 PROCEDURE — 99284 EMERGENCY DEPT VISIT MOD MDM: CPT | Performed by: FAMILY MEDICINE

## 2024-01-31 PROCEDURE — 99285 EMERGENCY DEPT VISIT HI MDM: CPT | Performed by: FAMILY MEDICINE

## 2024-01-31 RX ORDER — QUETIAPINE FUMARATE 100 MG/1
100 TABLET, FILM COATED ORAL ONCE
Status: COMPLETED | OUTPATIENT
Start: 2024-01-31 | End: 2024-01-31

## 2024-01-31 RX ORDER — ACETAMINOPHEN 325 MG/1
975 TABLET ORAL ONCE
Status: COMPLETED | OUTPATIENT
Start: 2024-01-31 | End: 2024-01-31

## 2024-01-31 RX ADMIN — QUETIAPINE FUMARATE 100 MG: 100 TABLET ORAL at 17:53

## 2024-01-31 RX ADMIN — ACETAMINOPHEN 975 MG: 325 TABLET, FILM COATED ORAL at 17:57

## 2024-01-31 ASSESSMENT — ACTIVITIES OF DAILY LIVING (ADL): ADLS_ACUITY_SCORE: 37

## 2024-01-31 NOTE — ED TRIAGE NOTES
Triage Assessment (Adult)       Row Name 01/31/24 1727          Triage Assessment    Airway WDL WDL        Respiratory WDL    Respiratory WDL WDL        Skin Circulation/Temperature WDL    Skin Circulation/Temperature WDL WDL        Cardiac WDL    Cardiac WDL WDL        Peripheral/Neurovascular WDL    Peripheral Neurovascular WDL WDL        Cognitive/Neuro/Behavioral WDL    Cognitive/Neuro/Behavioral WDL WDL

## 2024-02-01 ENCOUNTER — HOSPITAL ENCOUNTER (EMERGENCY)
Facility: CLINIC | Age: 24
Discharge: HOME | End: 2024-02-01
Attending: EMERGENCY MEDICINE | Admitting: EMERGENCY MEDICINE
Payer: MEDICARE

## 2024-02-01 VITALS
RESPIRATION RATE: 18 BRPM | SYSTOLIC BLOOD PRESSURE: 117 MMHG | TEMPERATURE: 98.1 F | HEIGHT: 63 IN | OXYGEN SATURATION: 100 % | HEART RATE: 98 BPM | BODY MASS INDEX: 43.41 KG/M2 | DIASTOLIC BLOOD PRESSURE: 82 MMHG | WEIGHT: 245 LBS

## 2024-02-01 DIAGNOSIS — F60.3 BORDERLINE PERSONALITY DISORDER (H): ICD-10-CM

## 2024-02-01 PROCEDURE — 99285 EMERGENCY DEPT VISIT HI MDM: CPT | Performed by: EMERGENCY MEDICINE

## 2024-02-01 PROCEDURE — 99283 EMERGENCY DEPT VISIT LOW MDM: CPT | Performed by: EMERGENCY MEDICINE

## 2024-02-01 NOTE — ED TRIAGE NOTES
Patient presents due to waking up feeling suicidal; denies plan at this time.      Triage Assessment (Adult)       Row Name 02/01/24 0919          Triage Assessment    Airway WDL WDL        Respiratory WDL    Respiratory WDL WDL        Skin Circulation/Temperature WDL    Skin Circulation/Temperature WDL WDL        Cardiac WDL    Cardiac WDL WDL        Peripheral/Neurovascular WDL    Peripheral Neurovascular WDL WDL        Cognitive/Neuro/Behavioral WDL    Cognitive/Neuro/Behavioral WDL WDL

## 2024-02-01 NOTE — DISCHARGE INSTRUCTIONS
Follow up with your outpatient mental health and psychiatry teams. Continue to take your medications as prescribed.

## 2024-02-01 NOTE — ED PROVIDER NOTES
[facility] EMERGENCY DEPARTMENT ([facility])    2/01/24      ED PROVIDER NOTE        History     Chief Complaint   Patient presents with    Suicidal     Patient presents due to waking up feeling suicidal; denies plan at this time.      RENATO Ross is a 23 year old female who presents to the ED for evaluation of feeling suicidal.  The patient has an Emergency Care Plan in place.   Her past medical history is notable for SI, Bipolar affective disorder, borderline personality disorder, ADHD, Depression, and intellectual disability.    The patient presents almost daily to emergency departments for various mental health concerns including suicidal thoughts and homicidal thoughts which are chronic. She has been evaluated by mental health and psychiatry many times and has an outpatient team in place. Psychiatry has recommended she continue to follow closely with her outpatient team and when more intensive treatment has been offered she has declined.    The patient states today she woke up feeling suicidal so called EMS and they brought her in. When asked to elaborate on this she states denies plan and states this is similar to prior issues she has had. She was seen here yesterday for similar mental health concerns and was given medication and discharged. She denies interest in medication or speaking to DEC.       Past Medical History  Past Medical History:   Diagnosis Date    ADHD (attention deficit hyperactivity disorder)     Bipolar 1 disorder (H)     Borderline personality disorder (H)     Depression     Depressive disorder     Intellectual disability     Obesity     Syncope      Past Surgical History:   Procedure Laterality Date    APPENDECTOMY      APPENDECTOMY       acetaminophen (TYLENOL) 325 MG tablet  albuterol (PROAIR HFA/PROVENTIL HFA/VENTOLIN HFA) 108 (90 Base) MCG/ACT inhaler  ARIPiprazole lauroxil ER (ARISTADA) 882 MG/3.2ML intra-muscular  bisacodyl (DULCOLAX) 5 MG EC tablet  calcium  "carbonate (TUMS) 500 MG chewable tablet  cyclobenzaprine (FLEXERIL) 10 MG tablet  dicyclomine (BENTYL) 20 MG tablet  docusate sodium (COLACE) 50 MG capsule  famotidine (PEPCID) 20 MG tablet  FLUoxetine (PROZAC) 40 MG capsule  hydrocortisone, Perianal, (HYDROCORTISONE) 2.5 % cream  hydrOXYzine (ATARAX) 50 MG tablet  ibuprofen (ADVIL/MOTRIN) 200 MG tablet  LORazepam (ATIVAN) 0.5 MG tablet  mupirocin (BACTROBAN) 2 % external ointment  OLANZapine zydis (ZYPREXA) 5 MG ODT  ondansetron (ZOFRAN) 4 MG tablet  ondansetron (ZOFRAN) 4 MG tablet  pantoprazole (PROTONIX) 40 MG EC tablet  polyethylene glycol (MIRALAX) 17 GM/Dose powder  promethazine (PHENERGAN) 12.5 MG tablet  sennosides (SENOKOT) 8.6 MG tablet  traZODone (DESYREL) 50 MG tablet  Vitamin D, Cholecalciferol, 25 MCG (1000 UT) TABS      Allergies   Allergen Reactions    Penicillins Rash and Unknown     Family History  Family History   Problem Relation Age of Onset    Diabetes Type 1 Father     Cancer Paternal Grandfather      Social History   Social History     Tobacco Use    Smoking status: Some Days     Packs/day: 0.25     Years: 5.00     Additional pack years: 0.00     Total pack years: 1.25     Types: Cigarettes    Smokeless tobacco: Never   Substance Use Topics    Alcohol use: No    Drug use: No         A medically appropriate review of systems was performed with pertinent positives and negatives noted in the HPI, and all other systems negative.    Physical Exam   BP: 117/82  Pulse: 98  Temp: 98.1  F (36.7  C)  Resp: 18  Height: 160 cm (5' 3\")  Weight: 111.1 kg (245 lb)  SpO2: 100 %  Physical Exam  Constitutional:       General: She is not in acute distress.     Appearance: Normal appearance. She is not toxic-appearing.   HENT:      Head: Normocephalic and atraumatic.      Nose: Nose normal. No congestion.      Mouth/Throat:      Mouth: Mucous membranes are moist.      Pharynx: Oropharynx is clear.   Eyes:      Extraocular Movements: Extraocular movements " intact.      Pupils: Pupils are equal, round, and reactive to light.   Cardiovascular:      Rate and Rhythm: Normal rate and regular rhythm.      Heart sounds: No murmur heard.  Pulmonary:      Effort: Pulmonary effort is normal. No respiratory distress.      Breath sounds: No wheezing.   Musculoskeletal:         General: Normal range of motion.      Cervical back: Normal range of motion.   Skin:     General: Skin is warm and dry.   Neurological:      General: No focal deficit present.      Mental Status: She is alert and oriented to person, place, and time.   Psychiatric:         Thought Content: Thought content includes suicidal ideation. Thought content does not include homicidal ideation. Thought content does not include homicidal or suicidal plan.           ED Course, Procedures, & Data      Procedures          Mental Health Risk Assessment        PSS-3      Date and Time Over the past 2 weeks have you felt down, depressed, or hopeless? Over the past 2 weeks have you had thoughts of killing yourself? Have you ever attempted to kill yourself? When did this last happen? User   02/01/24 0920 yes yes yes within the last month (but not today) TMW          C-SSRS ([place])      Date and Time Q1 Wished to be Dead (Past Month) Q2 Suicidal Thoughts (Past Month) Q3 Suicidal Thought Method Q4 Suicidal Intent without Specific Plan Q5 Suicide Intent with Specific Plan Q6 Suicide Behavior (Lifetime) Within the Past 3 Months? RETIRED: Level of Risk per Screen Screening Not Complete User   02/01/24 0920 yes yes no no no -- -- -- -- TMW                  Item Assessment   Suicidal Ideation Suicidal thoughts   Plan none   Intent none   Suicidal or self-harm behaviors N/a                  No results found for any visits on 02/01/24.  Medications - No data to display  Labs Ordered and Resulted from Time of ED Arrival to Time of ED Departure - No data to display  No orders to display          Critical care was not performed.      Medical Decision Making  The patient's presentation was of moderate complexity (2 or more stable chronic illnesses).    The patient's evaluation involved:  review of external note(s) from 3+ sources (mental health, psychiatry, outside ED notes, care plan note)  review of 3+ test result(s) ordered prior to this encounter (cbc, bmp, alcohol)    The patient's management necessitated only low risk treatment.    Assessment & Plan    23yoF with history of borderline personality here with suicidal thoughts. She has frequent ED visits for suicidal thoughts and has a care plan in place. She presents almost daily to the ED for similar complaints and has been seen by mental health and psychiatry. Her presentation today appears consistent with her chronic mental health conditions. She denies plan or suicidal intent, denies acute concerns or acute change in her mental health. I offered medication or DEC assessment if she wished to talk to someone further which she declined. She will be returned to her group home.     I have reviewed the nursing notes. I have reviewed the findings, diagnosis, plan and need for follow up with the patient.    New Prescriptions    No medications on file       Final diagnoses:   Borderline personality disorder (H)       Morgan Vidal  [facility] EMERGENCY DEPARTMENT  2/1/2024     Morgan Vidal MD  02/01/24 0958

## 2024-02-02 NOTE — ED PROVIDER NOTES
ED Provider Note  [facility]      History     Chief Complaint   Patient presents with    Suicidal     Feeling depressed and suicidal with her father's death anniversary coming up in February HPI  Sadaf Ross is a 23 year old female who presents emergency room with ongoing depression recent exacerbation agitation and having suicidal thoughts secondary to father's death anniversary.  Patient notes that Zyprexa has not been working well for her and these incidences and is open to the idea of trying Seroquel.    Past Medical History  Past Medical History:   Diagnosis Date    ADHD (attention deficit hyperactivity disorder)     Bipolar 1 disorder (H)     Borderline personality disorder (H)     Depression     Depressive disorder     Intellectual disability     Obesity     Syncope      Past Surgical History:   Procedure Laterality Date    APPENDECTOMY      APPENDECTOMY       acetaminophen (TYLENOL) 325 MG tablet  albuterol (PROAIR HFA/PROVENTIL HFA/VENTOLIN HFA) 108 (90 Base) MCG/ACT inhaler  ARIPiprazole lauroxil ER (ARISTADA) 882 MG/3.2ML intra-muscular  bisacodyl (DULCOLAX) 5 MG EC tablet  calcium carbonate (TUMS) 500 MG chewable tablet  cyclobenzaprine (FLEXERIL) 10 MG tablet  dicyclomine (BENTYL) 20 MG tablet  docusate sodium (COLACE) 50 MG capsule  famotidine (PEPCID) 20 MG tablet  FLUoxetine (PROZAC) 40 MG capsule  hydrocortisone, Perianal, (HYDROCORTISONE) 2.5 % cream  hydrOXYzine (ATARAX) 50 MG tablet  ibuprofen (ADVIL/MOTRIN) 200 MG tablet  LORazepam (ATIVAN) 0.5 MG tablet  mupirocin (BACTROBAN) 2 % external ointment  OLANZapine zydis (ZYPREXA) 5 MG ODT  ondansetron (ZOFRAN) 4 MG tablet  ondansetron (ZOFRAN) 4 MG tablet  pantoprazole (PROTONIX) 40 MG EC tablet  polyethylene glycol (MIRALAX) 17 GM/Dose powder  promethazine (PHENERGAN) 12.5 MG tablet  sennosides (SENOKOT) 8.6 MG tablet  traZODone (DESYREL) 50 MG tablet  Vitamin D, Cholecalciferol, 25 MCG (1000 UT)  TABS      Allergies   Allergen Reactions    Penicillins Rash and Unknown     Family History  Family History   Problem Relation Age of Onset    Diabetes Type 1 Father     Cancer Paternal Grandfather      Social History   Social History     Tobacco Use    Smoking status: Some Days     Packs/day: 0.25     Years: 5.00     Additional pack years: 0.00     Total pack years: 1.25     Types: Cigarettes    Smokeless tobacco: Never   Substance Use Topics    Alcohol use: No    Drug use: No         A medically appropriate review of systems was performed with pertinent positives and negatives noted in the HPI, and all other systems negative.    Physical Exam   BP: 139/81  Pulse: 115  Temp: 99  F (37.2  C)  Resp: 16  Weight: 108.9 kg (240 lb)  SpO2: 99 %  Physical Exam  Constitutional:       General: She is not in acute distress.     Appearance: Normal appearance. She is not diaphoretic.   HENT:      Head: Atraumatic.      Mouth/Throat:      Mouth: Mucous membranes are moist.   Eyes:      General: No scleral icterus.     Conjunctiva/sclera: Conjunctivae normal.   Cardiovascular:      Rate and Rhythm: Normal rate.      Heart sounds: Normal heart sounds.   Pulmonary:      Effort: No respiratory distress.      Breath sounds: Normal breath sounds.   Abdominal:      General: Abdomen is flat.   Musculoskeletal:      Cervical back: Neck supple.   Skin:     General: Skin is warm.      Findings: No rash.   Neurological:      General: No focal deficit present.      Mental Status: She is alert and oriented to person, place, and time.      Sensory: No sensory deficit.      Motor: No weakness.      Coordination: Coordination normal.   Psychiatric:         Mood and Affect: Mood is anxious.         Speech: Speech normal.         Behavior: Behavior is cooperative.         Thought Content: Thought content does not include homicidal or suicidal ideation.           ED Course, Procedures, & Data      Procedures         Suicide assessment completed by  mental health (D.E.C., LCSW, etc.)       No results found for any visits on 01/31/24.  Medications   QUEtiapine (SEROquel) tablet 100 mg (100 mg Oral $Given 1/31/24 1753)   acetaminophen (TYLENOL) tablet 975 mg (975 mg Oral $Given 1/31/24 1757)     Labs Ordered and Resulted from Time of ED Arrival to Time of ED Departure - No data to display  No orders to display          Critical care was not performed.     Medical Decision Making  The patient's presentation was of moderate complexity (a chronic illness mild to moderate exacerbation, progression, or side effect of treatment).    The patient's evaluation involved:  history and exam without other MDM data elements    The patient's management necessitated moderate risk (prescription drug management including medications given in the ED).    Assessment & Plan        I have reviewed the nursing notes. I have reviewed the findings, diagnosis, plan and need for follow up with the patient.        Final diagnoses:   Intellectual disability   Borderline personality disorder (H)         [facility] EMERGENCY DEPARTMENT  1/31/2024     Robert Olea MD  02/01/24 [id number]

## 2024-02-06 ENCOUNTER — HOSPITAL ENCOUNTER (EMERGENCY)
Facility: HOSPITAL | Age: 24
Discharge: HOME | End: 2024-02-06
Attending: EMERGENCY MEDICINE | Admitting: EMERGENCY MEDICINE
Payer: MEDICARE

## 2024-02-06 VITALS
TEMPERATURE: 97.9 F | WEIGHT: 253 LBS | SYSTOLIC BLOOD PRESSURE: 143 MMHG | HEART RATE: 109 BPM | HEIGHT: 63 IN | RESPIRATION RATE: 16 BRPM | OXYGEN SATURATION: 98 % | DIASTOLIC BLOOD PRESSURE: 87 MMHG | BODY MASS INDEX: 44.83 KG/M2

## 2024-02-06 DIAGNOSIS — F32.A DEPRESSION, UNSPECIFIED DEPRESSION TYPE: ICD-10-CM

## 2024-02-06 LAB
ANION GAP SERPL CALCULATED.3IONS-SCNC: 11 MMOL/L (ref 7–15)
BASOPHILS # BLD AUTO: 0 10E3/UL (ref 0–0.2)
BASOPHILS NFR BLD AUTO: 1 %
BUN SERPL-MCNC: 8.6 MG/DL (ref 6–20)
CALCIUM SERPL-MCNC: 8.8 MG/DL (ref 8.6–10)
CHLORIDE SERPL-SCNC: 107 MMOL/L (ref 98–107)
CREAT SERPL-MCNC: 0.81 MG/DL (ref 0.51–0.95)
DEPRECATED HCO3 PLAS-SCNC: 23 MMOL/L (ref 22–29)
EGFRCR SERPLBLD CKD-EPI 2021: >90 ML/MIN/1.73M2
EOSINOPHIL # BLD AUTO: 0.2 10E3/UL (ref 0–0.7)
EOSINOPHIL NFR BLD AUTO: 2 %
ERYTHROCYTE [DISTWIDTH] IN BLOOD BY AUTOMATED COUNT: 13.6 % (ref 10–15)
GLUCOSE BLDC GLUCOMTR-MCNC: 109 MG/DL (ref 70–99)
GLUCOSE SERPL-MCNC: 83 MG/DL (ref 70–99)
HCG SERPL QL: NEGATIVE
HCT VFR BLD AUTO: 36.5 % (ref 35–47)
HGB BLD-MCNC: 12.2 G/DL (ref 11.7–15.7)
IMM GRANULOCYTES # BLD: 0 10E3/UL
IMM GRANULOCYTES NFR BLD: 0 %
LYMPHOCYTES # BLD AUTO: 2.4 10E3/UL (ref 0.8–5.3)
LYMPHOCYTES NFR BLD AUTO: 30 %
MAGNESIUM SERPL-MCNC: 2.1 MG/DL (ref 1.7–2.3)
MCH RBC QN AUTO: 27.4 PG (ref 26.5–33)
MCHC RBC AUTO-ENTMCNC: 33.4 G/DL (ref 31.5–36.5)
MCV RBC AUTO: 82 FL (ref 78–100)
MONOCYTES # BLD AUTO: 0.5 10E3/UL (ref 0–1.3)
MONOCYTES NFR BLD AUTO: 6 %
NEUTROPHILS # BLD AUTO: 4.9 10E3/UL (ref 1.6–8.3)
NEUTROPHILS NFR BLD AUTO: 61 %
NRBC # BLD AUTO: 0 10E3/UL
NRBC BLD AUTO-RTO: 0 /100
PLATELET # BLD AUTO: 202 10E3/UL (ref 150–450)
POTASSIUM SERPL-SCNC: 4.1 MMOL/L (ref 3.4–5.3)
RBC # BLD AUTO: 4.45 10E6/UL (ref 3.8–5.2)
SODIUM SERPL-SCNC: 141 MMOL/L (ref 135–145)
WBC # BLD AUTO: 8 10E3/UL (ref 4–11)

## 2024-02-06 PROCEDURE — 99285 EMERGENCY DEPT VISIT HI MDM: CPT

## 2024-02-06 PROCEDURE — 84703 CHORIONIC GONADOTROPIN ASSAY: CPT | Performed by: EMERGENCY MEDICINE

## 2024-02-06 PROCEDURE — 83735 ASSAY OF MAGNESIUM: CPT | Performed by: EMERGENCY MEDICINE

## 2024-02-06 PROCEDURE — 36415 COLL VENOUS BLD VENIPUNCTURE: CPT | Performed by: EMERGENCY MEDICINE

## 2024-02-06 PROCEDURE — 84146 ASSAY OF PROLACTIN: CPT | Performed by: EMERGENCY MEDICINE

## 2024-02-06 PROCEDURE — 80048 BASIC METABOLIC PNL TOTAL CA: CPT | Performed by: EMERGENCY MEDICINE

## 2024-02-06 PROCEDURE — 258N000003 HC RX IP 258 OP 636: Performed by: EMERGENCY MEDICINE

## 2024-02-06 PROCEDURE — 85025 COMPLETE CBC W/AUTO DIFF WBC: CPT | Performed by: EMERGENCY MEDICINE

## 2024-02-06 PROCEDURE — 82962 GLUCOSE BLOOD TEST: CPT

## 2024-02-06 RX ADMIN — SODIUM CHLORIDE 1000 ML: 9 INJECTION, SOLUTION INTRAVENOUS at 20:17

## 2024-02-06 ASSESSMENT — ENCOUNTER SYMPTOMS
DIARRHEA: 0
FEVER: 0
VOMITING: 0
DYSURIA: 0
SHORTNESS OF BREATH: 0
NAUSEA: 0
DYSPHORIC MOOD: 1
ABDOMINAL PAIN: 0
NERVOUS/ANXIOUS: 1
COUGH: 0

## 2024-02-06 ASSESSMENT — ACTIVITIES OF DAILY LIVING (ADL)
ADLS_ACUITY_SCORE: 37
ADLS_ACUITY_SCORE: 39

## 2024-02-07 PROBLEM — F60.3 BORDERLINE PERSONALITY DISORDER (H): Chronic | Status: ACTIVE | Noted: 2021-07-12

## 2024-02-07 LAB — PROLACTIN SERPL 3RD IS-MCNC: 6 NG/ML (ref 5–23)

## 2024-02-07 NOTE — ED PROVIDER NOTES
EMERGENCY DEPARTMENT ENCOUNTER      NAME: Sadaf Ross  AGE: 23 year old female  YOB: 2000  MRN: [id number]  EVALUATION DATE & TIME: 2/6/2024  6:55 PM    PCP: Jannet, [facility]    ED PROVIDER: Vanda Rivas M.D.        Chief Complaint   Patient presents with    Suicidal    Anxiety         FINAL IMPRESSION:    1. Depression, unspecified depression type            MEDICAL DECISION MAKING:    Sadaf Ross is a 23 year old female with history of ADHD, depression, bipolar, intellectual disability, borderline personality, who presents to the ER with complaints of anxiety and depression.     At this time patient is medically cleared from a mental health standpoint.  I do not think she is truly suicidal or trying to harm herself from that standpoint.  Nurses were concerned about whether or not she may have had a seizure.  She had no postictal state and no activity was seen when I was in the room.  Laboratories unremarkable.  At this time it may have been a nonepileptic type seizure.  I do not think she requires any type of imaging or further laboratory evaluation at this time.  Patient agrees with the plan for discharge home.  All of her questions have been answered.      ED COURSE:  7:03 PM  I met with the patient to gather history and perform my exam. ED course and treatment discussed.  Plan is to do a DEC assessment.    7:45 PM  The patient bedside for questionable seizure.  She was having some twitching.  She turned her head to the side.  When I got to the room there was no twitching activity.  She was able to tell me who she is and where she is.  She is able to participate in an active conversation without signs of a postictal state.    8:32 PM  Patient asking to leave.  At this time I do not think that she is holdable but have temporarily redirected her to wait for DEC assessment.    9:21 PM  Patient has been seen by DEC and they are in agreement that she is safe for discharge home.    At  this time I feel the patient is appropriate for discharge home back to her group home.    At the conclusion of the encounter I discussed the results of all of the tests and the disposition. Their questions were answered. The patient (and any family present) acknowledged understanding and were agreeable with the care plan.      Mental Health Risk Assessment        PSS-3      Date and Time Over the past 2 weeks have you felt down, depressed, or hopeless? Over the past 2 weeks have you had thoughts of killing yourself? Have you ever attempted to kill yourself? When did this last happen? User   02/06/24 1816 yes yes yes more than 6 months ago [facility]          C-SSRS ([place])      Date and Time Q1 Wished to be Dead (Past Month) Q2 Suicidal Thoughts (Past Month) Q3 Suicidal Thought Method Q4 Suicidal Intent without Specific Plan Q5 Suicide Intent with Specific Plan Q6 Suicide Behavior (Lifetime) Within the Past 3 Months? RETIRED: Level of Risk per Screen Screening Not Complete User   02/06/24 1934 yes yes no no no yes -- -- -- [facility]   02/06/24 1816 yes yes yes no yes -- -- -- -- [facility]                Suicide assessment completed by mental health (D.E.C., LCSW, etc.)       CONSULTANTS:  KAVITHA Camp        MEDICATIONS GIVEN IN THE EMERGENCY:  Medications   sodium chloride 0.9% BOLUS 1,000 mL (1,000 mLs Intravenous $New Bag 2/6/24 2017)           NEW PRESCRIPTIONS STARTED AT TODAY'S ER VISIT     Medication List      There are no discharge medications for this visit.             CONDITION:  stable        DISPOSITION:  D. C back to her group home         =================================================================  =================================================================  TRIAGE ASSESSMENT:  Patient arrives to triage via a cab from [facility] with chief complaint of suicidal thoughts and feeling very anxious.  Patient reports symptoms started today.  Patient reports thoughts of harming herself by self  "inflicting wounds such as hitting herself and biting herself.  Patient is alert and oriented x4.  Cooperative in triage.      ED Triage Vitals [02/06/24 1821]   Enc Vitals Group      BP (!) 144/94      Pulse 109      Resp 16      Temp 98.2  F (36.8  C)      Temp src Oral      SpO2 98 %      Weight 114.8 kg (253 lb)      Height 1.6 m (5' 3\")          ================================================================  ================================================================    HPI    Patient information was obtained from: patient    Use of Intrepreter: N/A     Sadaf AMAN Ross is a 23 year old female with history of ADHD, depression, bipolar, intellectual disability, borderline personality, who presents to the ER with complaints of anxiety and depression.    Patient reports that the anniversary of her father's death is coming up very soon as well as the death of her \"baby sister \".  This has her feeling very anxious.  She has had some thoughts of self-harm which include biting herself which is using her way of soothing herself.  She denies actually wanting to kill herself.  She is here hoping for some assistance with this anxiety.  She comes from her group home facility.    She otherwise denies any fevers, cough, chest pain, shortness of breath, vomiting or diarrhea.      CHART REVIEW:  Review shows that she was seen in the emergency department yesterday [facility] for reports of depression and suicidal ideation.      REVIEW OF SYSTEMS  Review of Systems   Constitutional:  Negative for fever.   Respiratory:  Negative for cough and shortness of breath.    Cardiovascular:  Negative for chest pain.   Gastrointestinal:  Negative for abdominal pain, diarrhea, nausea and vomiting.   Genitourinary:  Negative for dysuria.   Psychiatric/Behavioral:  Positive for dysphoric mood. Negative for suicidal ideas. The patient is nervous/anxious.    All other systems reviewed and are negative.        PAST MEDICAL " HISTORY:  Past Medical History:   Diagnosis Date    ADHD (attention deficit hyperactivity disorder)     Bipolar 1 disorder (H)     Borderline personality disorder (H)     Depression     Depressive disorder     Intellectual disability     Obesity     Syncope          PAST SURGICAL HISTORY:  Past Surgical History:   Procedure Laterality Date    APPENDECTOMY      APPENDECTOMY           CURRENT MEDICATIONS:    Prior to Admission medications    Medication Sig Start Date End Date Taking? Authorizing Provider   acetaminophen (TYLENOL) 325 MG tablet Take 650 mg by mouth every 6 hours as needed for mild pain    Unknown, Entered By History   albuterol (PROAIR HFA/PROVENTIL HFA/VENTOLIN HFA) 108 (90 Base) MCG/ACT inhaler Inhale 2 puffs into the lungs every 6 hours as needed for shortness of breath, wheezing or cough 3/22/23   Robert Olea MD   ARIPiprazole lauroxil ER (ARISTADA) 882 MG/3.2ML intra-muscular Inject 882 mg into the muscle every 28 days On the 22nd of each month    Unknown, Entered By History   bisacodyl (DULCOLAX) 5 MG EC tablet Take 1 tablet (5 mg) by mouth daily as needed for constipation 2/12/23   Bernardino Schwarz MD   calcium carbonate (TUMS) 500 MG chewable tablet Take 1 chew tab by mouth 2 times daily as needed for heartburn     Reported, Patient   cyclobenzaprine (FLEXERIL) 10 MG tablet Take 10 mg by mouth 3 times daily as needed for muscle spasms    Unknown, Entered By History   dicyclomine (BENTYL) 20 MG tablet Take 20 mg by mouth 2 times daily as needed (dyspepsia)    Unknown, Entered By History   docusate sodium (COLACE) 50 MG capsule Take 100 mg by mouth 2 times daily    Reported, Patient   famotidine (PEPCID) 20 MG tablet Take 20 mg by mouth daily    Unknown, Entered By History   FLUoxetine (PROZAC) 40 MG capsule Take 80 mg by mouth daily    Unknown, Entered By History   hydrocortisone, Perianal, (HYDROCORTISONE) 2.5 % cream Place rectally 2 times daily as needed for hemorrhoids  6/18/23   Robert Olea MD   hydrOXYzine (ATARAX) 50 MG tablet Take 50 mg by mouth 2 times daily as needed for anxiety    Reported, Patient   ibuprofen (ADVIL/MOTRIN) 200 MG tablet Take 2 tablets (400 mg) by mouth every 6 hours as needed for pain 12/15/23   Manav Knott MD   LORazepam (ATIVAN) 0.5 MG tablet Take 0.5 mg by mouth once as needed for anxiety Give as needed any time she has the urge to call 911 or to visit the ER    Unknown, Entered By History   mupirocin (BACTROBAN) 2 % external ointment Apply topically 3 times daily 6/10/23   Ghassan Browning,    OLANZapine zydis (ZYPREXA) 5 MG ODT Take 1 tablet (5 mg) by mouth At Bedtime 12/23/22   Robert Olea MD   ondansetron (ZOFRAN) 4 MG tablet Take 1 tablet (4 mg) by mouth every 8 hours as needed 3/15/23   Tori Whalen MD   ondansetron (ZOFRAN) 4 MG tablet Take 4 mg by mouth every 8 hours as needed for nausea    Unknown, Entered By History   pantoprazole (PROTONIX) 40 MG EC tablet Take 40 mg by mouth daily    Unknown, Entered By History   polyethylene glycol (MIRALAX) 17 GM/Dose powder Take 17 g by mouth daily    Reported, Patient   promethazine (PHENERGAN) 12.5 MG tablet Take 12.5 mg by mouth every 4 hours    Reported, Patient   sennosides (SENOKOT) 8.6 MG tablet Take 1 tablet by mouth 2 times daily as needed for constipation    Unknown, Entered By History   traZODone (DESYREL) 50 MG tablet Take 100 mg by mouth At Bedtime    Unknown, Entered By History   Vitamin D, Cholecalciferol, 25 MCG (1000 UT) TABS Take 1,000 Units by mouth daily     Reported, Patient         ALLERGIES:  Allergies   Allergen Reactions    Penicillins Rash and Unknown         FAMILY HISTORY:  Family History   Problem Relation Age of Onset    Diabetes Type 1 Father     Cancer Paternal Grandfather          SOCIAL HISTORY:  Social History     Socioeconomic History    Marital status: Single   Tobacco Use    Smoking status: Some Days     Packs/day:  "0.25     Years: 5.00     Additional pack years: 0.00     Total pack years: 1.25     Types: Cigarettes    Smokeless tobacco: Never   Substance and Sexual Activity    Alcohol use: No    Drug use: No    Sexual activity: Not Currently     Partners: Female, Male     Birth control/protection: Pill, Injection         VITALS:  Patient Vitals for the past 24 hrs:   BP Temp Temp src Pulse Resp SpO2 Height Weight   02/06/24 1900 (!) 143/87 97.9  F (36.6  C) Oral -- 16 98 % -- --   02/06/24 1821 (!) 144/94 98.2  F (36.8  C) Oral 109 16 98 % 1.6 m (5' 3\") 114.8 kg (253 lb)       Wt Readings from Last 3 Encounters:   02/06/24 114.8 kg (253 lb)   02/01/24 111.1 kg (245 lb)   01/31/24 108.9 kg (240 lb)       Estimated Creatinine Clearance: 130.9 mL/min (based on SCr of 0.81 mg/dL).    PHYSICAL EXAM    Constitutional:  Well developed, Well nourished, NAD  HENT:  Normocephalic, Atraumatic, Bilateral external ears normal, Nose normal. Neck- Supple, No stridor.   Eyes:  PERRL, EOMI, Conjunctiva normal, No discharge.  Respiratory:  Normal breath sounds, No respiratory distress, No wheezing, Speaks full sentences easily. No cough.   Cardiovascular:  Normal heart rate, Regular rhythm, No rubs, No gallops.   GI:  +obesity.  Bowel sounds normal, Soft, No tenderness, No masses, No flank tenderness. No rebound or guarding.   : deferred  Musculoskeletal: No cyanosis, No clubbing. Good range of motion in all major joints. No major deformities noted.   Integument:  Warm, Dry, No erythema, No rash.  No petechiae.   Neurologic:  Alert & oriented x 3, NNo focal deficits noted. Normal gait.   Psychiatric:  Affect normal, Cooperative, denies true SI, but does have thoughts of self harm to soothe herself (biting herself)         LAB:  All pertinent labs reviewed and interpreted.  Recent Results (from the past 24 hour(s))   Glucose by meter    Collection Time: 02/06/24  7:41 PM   Result Value Ref Range    GLUCOSE BY METER POCT 109 (H) 70 - 99 mg/dL " "  Basic metabolic panel    Collection Time: 02/06/24  8:16 PM   Result Value Ref Range    Sodium 141 135 - 145 mmol/L    Potassium 4.1 3.4 - 5.3 mmol/L    Chloride 107 98 - 107 mmol/L    Carbon Dioxide (CO2) 23 22 - 29 mmol/L    Anion Gap 11 7 - 15 mmol/L    Urea Nitrogen 8.6 6.0 - 20.0 mg/dL    Creatinine 0.81 0.51 - 0.95 mg/dL    GFR Estimate >90 >60 mL/min/1.73m2    Calcium 8.8 8.6 - 10.0 mg/dL    Glucose 83 70 - 99 mg/dL   HCG QUALitative pregnancy (blood)    Collection Time: 02/06/24  8:16 PM   Result Value Ref Range    hCG Serum Qualitative Negative Negative   Magnesium    Collection Time: 02/06/24  8:16 PM   Result Value Ref Range    Magnesium 2.1 1.7 - 2.3 mg/dL   CBC with platelets and differential    Collection Time: 02/06/24  8:16 PM   Result Value Ref Range    WBC Count 8.0 4.0 - 11.0 10e3/uL    RBC Count 4.45 3.80 - 5.20 10e6/uL    Hemoglobin 12.2 11.7 - 15.7 g/dL    Hematocrit 36.5 35.0 - 47.0 %    MCV 82 78 - 100 fL    MCH 27.4 26.5 - 33.0 pg    MCHC 33.4 31.5 - 36.5 g/dL    RDW 13.6 10.0 - 15.0 %    Platelet Count 202 150 - 450 10e3/uL    % Neutrophils 61 %    % Lymphocytes 30 %    % Monocytes 6 %    % Eosinophils 2 %    % Basophils 1 %    % Immature Granulocytes 0 %    NRBCs per 100 WBC 0 <1 /100    Absolute Neutrophils 4.9 1.6 - 8.3 10e3/uL    Absolute Lymphocytes 2.4 0.8 - 5.3 10e3/uL    Absolute Monocytes 0.5 0.0 - 1.3 10e3/uL    Absolute Eosinophils 0.2 0.0 - 0.7 10e3/uL    Absolute Basophils 0.0 0.0 - 0.2 10e3/uL    Absolute Immature Granulocytes 0.0 <=0.4 10e3/uL    Absolute NRBCs 0.0 10e3/uL       No results found for: \"ABORH\"        RADIOLOGY:  none      EKG:    none      PROCEDURES:  none    Medical Decision Making  Obtained supplemental history:Supplemental history obtained?: No  Reviewed external records: External records reviewed?: Documented in chart and Outpatient Record: see HPI  Care impacted by chronic illness:Mental Health  Care significantly affected by social determinants of " health:Access to Medical Care  Did you consider but not order tests?: Work up considered but not performed and documented in chart, if applicable  Did you interpret images independently?: Independent interpretation of ECG and images noted in documentation, when applicable.  Consultation discussion with other provider:Did you involve another provider (consultant, , pharmacy, etc.)?: I discussed the care with another health care provider, see documentation for details.  Discharge. No recommendations on prescription strength medication(s). I considered admission, but ultimately discharged patient as both DEC and myself do not feel that she is a danger to herself or others at this time..    Vanda Rivas M.D. [facility]  Emergency Medicine and Medical Toxicology  Formerly [facility] EMERGENCY DEPARTMENT  [address] [place] [id number] [phone]  Dept: [phone]           Vanda Rivas MD  02/06/24 [id number]

## 2024-02-07 NOTE — ED TRIAGE NOTES
Patient arrives to triage via a cab from [facility] with chief complaint of suicidal thoughts and feeling very anxious.  Patient reports symptoms started today.  Patient reports thoughts of harming herself by self inflicting wounds such as hitting herself and biting herself.  Patient is alert and oriented x4.  Cooperative in triage.

## 2024-02-07 NOTE — CONSULTS
Diagnostic Evaluation Consultation  Crisis Assessment    Patient Name: Sadaf Ross  Age:  23 year old  Legal Sex: female  Gender Identity: female  Pronouns:   Race: White  Ethnicity: Not  or   Language: English      Patient was assessed: Virtual: [employer] Crisis Assessment Start Time: 2059 Crisis Assessment Stop Time: 2114  Patient location: [facility] EMERGENCY DEPARTMENT                                 Referral Data and Chief Complaint  Sadaf Ross presents to the ED by  self. Patient is presenting to the ED for the following concerns: Suicidal ideation, Health stressors.   Factors that make the mental health crisis life threatening or complex are:  Pt reports she comes to the ED by a medical ride. Pt reports she is having more memories about her father and sister who have passed years ago. Pt reports she has been crying more. Pt reports that she is feeling suicidal and denies having a plan, means, or intent to harm herself or others. Pt reports that she doesn't need to be in the ED and wants to return back to her group home. Pt denies having any ideas of self-harm today. Pt reports she is primarily in ED to discuss fainting. Pt reports she is not in crisis, as her suicide ideation is chronic at baseline and reports she has been using skills and supports and has no issues with her group home at present. Pt presents as calm, cooperative, and engaged..      Informed Consent and Assessment Methods  Explained the crisis assessment process, including applicable information disclosures and limits to confidentiality, assessed understanding of the process, and obtained consent to proceed with the assessment.  Assessment methods included conducting a formal interview with patient, review of medical records, collaboration with medical staff, and obtaining relevant collateral information from family and community providers when available.  : done     Patient response to  interventions: verbalizes understanding, acceptance expressed  Coping skills were attempted to reduce the crisis:  talk to supports     History of the Crisis   Pt has history of frequent ED visits for chronic suicide ideation related to hitting herself or biting herself. Pt lives in a group home with 24/7 staff supervision, has a therapist, PCA, , and has a medication provider.    Brief Psychosocial History  Family:  Single, Children no  Support System:  Parent(s), Sibling(s), Grandparent(s), Facility resident(s)/Staff  Employment Status:  disabled  Source of Income:  disability  Financial Environmental Concerns:  none  Current Hobbies:  music, puzzles, group/social activities, television/movies/videos  Barriers in Personal Life:  cognitive limitations, mental health concerns    Significant Clinical History  Current Anxiety Symptoms:     Current Depression/Trauma:  crying or feels like crying, sadness  Current Somatic Symptoms:  somatic symptoms (abdominal pain, headache, tension)  Current Psychosis/Thought Disturbance:     Current Eating Symptoms:     Chemical Use History:  Alcohol: None  Benzodiazepines: None  Opiates: None  Cocaine: None  Marijuana: None  Other Use: None   Past diagnosis:  ADHD, Anxiety Disorder, Bipolar Disorder, Depression, Personality Disorder, Suicide attempt(s)  Family history:  Anxiety Disorder, Depression  Past treatment:  Individual therapy, Case management, Psychiatric Medication Management, Supportive Living Environment (group home, retirement house, etc), Inpatient Hospitalization, [facility]  Details of most recent treatment:  Pt lives in a group home ([facility]) that is staffed 24/7. Pt has a PCA.  She has OP therapy and psychiatry.  Other relevant history:          Collateral Information  Is there collateral information: Yes     Collateral information name, relationship, phone number:  Marcia King (Friend) -- -- [phone]    What happened today: Reports pt  "mental health has been poor the past few years since pt father passed away and the anniversary is coming up. Reports pt came to ED due to fainting.     What is different about patient's functioning: Reports pt engages in self-harm and overthinks, \"she does this often and needs to learn other skills.\" Reports pt is at baseline functioning for mental health.     Concern about alcohol/drug use:      What do you think the patient needs:      Has patient made comments about wanting to kill themselves/others: yes (Reports awareness that pt has chronic SI. Reports able to restrict pt access to items she may harm herself with)    If d/c is recommended, can they take part in safety/aftercare planning:  yes    Additional collateral information:  Writer spoke to [facility] , [phone]. Reports pt was planning to stay with Marcia, family friend, and was reporting feeling safe to do so. Can support pt at group home if/when she wants to return. Reports pt was planning to stay at Marcia's \"for a couple days.\"     Risk Assessment  Carson Suicide Severity Rating Scale Full Clinical Version:  Suicidal Ideation  Q1 Wish to be Dead (Lifetime): Yes  Q2 Non-Specific Active Suicidal Thoughts (Lifetime): Yes  3. Active Suicidal Ideation with any Methods (Not Plan) Without Intent to Act (Lifetime): Yes  Q4 Active Suicidal Ideation with Some Intent to Act, Without Specific Plan (Lifetime): Yes  Q6 Suicide Behavior (Lifetime): yes     Suicidal Behavior (Lifetime)  Actual Attempt (Lifetime): Yes  Total Number of Actual Attempts (Lifetime): 5  Has subject engaged in non-suicidal self-injurious behavior? (Lifetime): Yes  Interrupted Attempts (Lifetime): No  Aborted or Self-Interrupted Attempt (Lifetime): Yes  Total Number of Aborted or Self-Interrupted Attempts (Lifetime): 5  Preparatory Acts or Behavior (Lifetime): No    Carson Suicide Severity Rating Scale Recent:   Suicidal Ideation (Recent)  Q1 Wished to be " Dead (Past Month): yes  Q2 Suicidal Thoughts (Past Month): no  Q3 Suicidal Thought Method: no  Q4 Suicidal Intent without Specific Plan: no  Q5 Suicide Intent with Specific Plan: no  Within the Past 3 Months?: no  Level of Risk per Screen: low risk  Intensity of Ideation (Recent)  Most Severe Ideation Rating (Past 1 Month): 2  Frequency (Past 1 Month): 2-5 times in week  Duration (Past 1 Month): Less than 1 hour/some of the time  Controllability (Past 1 Month): Easily able to control thoughts  Deterrents (Past 1 Month): Deterrents definitely stopped you from attempting suicide  Reasons for Ideation (Past 1 Month): Mostly to get attention, revenge, or a reaction from others  Suicidal Behavior (Recent)  Actual Attempt (Past 3 Months): Yes  Total Number of Actual Attempts (Past 3 Months): 1  Has subject engaged in non-suicidal self-injurious behavior? (Past 3 Months): Yes  Interrupted Attempts (Past 3 Months): Yes  Total Number of Interrupted Attempts (Past 3 Months): 1  Aborted or Self-Interrupted Attempt (Past 3 Months): Yes  Total Number of Aborted or Self-Interrupted Attempts (Past 3 Months): 1  Preparatory Acts or Behavior (Past 3 Months): No    Environmental or Psychosocial Events: loss of a loved one, helplessness/hopelessness, impulsivity/recklessness, other life stressors  Protective Factors: Protective Factors: strong bond to family unit, community support, or employment, responsibilities and duties to others, including pets and children, lives in a responsibly safe and stable environment, help seeking, able to access care without barriers    Does the patient have thoughts of harming others? Feels Like Hurting Others: no  Previous Attempt to Hurt Others: no  Is the patient engaging in sexually inappropriate behavior?: no    Is the patient engaging in sexually inappropriate behavior?  no        Mental Status Exam   Affect: Appropriate  Appearance: Appropriate  Attention Span/Concentration: Attentive  Eye  Contact: Engaged    Fund of Knowledge: Appropriate   Language /Speech Content: Fluent  Language /Speech Volume: Normal  Language /Speech Rate/Productions: Normal  Recent Memory: Intact  Remote Memory: Intact  Mood: Normal  Orientation to Person: Yes   Orientation to Place: Yes  Orientation to Time of Day: Yes  Orientation to Date: Yes     Situation (Do they understand why they are here?): Yes  Psychomotor Behavior: Normal  Thought Content: Clear  Thought Form: Intact     Mini-Cog Assessment  Number of Words Recalled:    Clock-Drawing Test:     Three Item Recall:    Mini-Cog Total Score:       Medication  Psychotropic medications:   Medication Orders - Psychiatric (From admission, onward)      None             Current Care Team  Patient Care Team:  [facility] as PCP - General (Clinic)  Chloe Alva APRN CNP as Nurse Practitioner (OB/Gyn)  Harley Esparza CNM as Assigned OBGYN Provider  Seble Jones PA-C as Physician Assistant    Diagnosis  Patient Active Problem List   Diagnosis Code    MENTAL HEALTH     Suicidal ideation R45.851    Suicidal ideations R45.851    Intellectual disability F79    Bipolar affective disorder, currently depressed, moderate (H) F31.32    Borderline personality disorder (H) F60.3    Morbid obesity (H) E66.01       Primary Problem This Admission  Active Hospital Problems    *Borderline personality disorder (H)        Clinical Summary and Substantiation of Recommendations   After therapeutic assessment, intervention and aftercare planning by ED care team and LMHP and in consultation with attending provider, the patient's circumstances and mental state were appropriate for outpatient management. It is the recommendation of this clinician that pt discharge with OP MH support. A this time the pt is not presenting as an acute risk to self or others due to the following factors: Pt presents for health concern and suicide ideation. Pt denies having any plans, means, or  intent to harm herself or others. Pt denies SIB/HI/hallucinations/delusions. Pt has chronic SI and reports several skills and supports she can use. Pt feels safe to return home and family friend agrees. Reports pt will be stayin gwith her for several days, as this is the anniversary of pt father death, which pt is processing with family and supports. Pt has significant outpatient involvement and lives in a group home with 24/7 staff supervision.                          Patient coping skills attempted to reduce the crisis:  talk to supports    Disposition  Recommended disposition: Individual Therapy, Medication Management, Group Home        Reviewed case and recommendations with attending provider. Attending Name: Dr. Rivas       Attending concurs with disposition: yes       Patient and/or validated legal guardian concurs with disposition:   yes       Final disposition:  discharge    Legal status on admission:      Assessment Details   Total duration spent with the patient: 15 min     CPT code(s) utilized: Non-Billable    MEL Robbins, LADC, Psychotherapist  [facility] - Triage & Transition Services  Callback: [phone]

## 2024-02-07 NOTE — ED NOTES
Writer and charge nurse aware that patient will require 1:1; plan is to go to room 7 when available.  Writer notified security and waiting room nurse Jaclyn.

## 2024-02-07 NOTE — DISCHARGE INSTRUCTIONS
Continue to work with your outpatient mental health providers.    Return to emergency department if you have worsening thoughts of wanting to harm yourself, suicidal thoughts, or any other concerns.  ================================  Aftercare Plan  If I am feeling unsafe or I am in a crisis, I will:   Contact my established care providers   Call the National Suicide Prevention Lifeline: 250.675.1968   Go to the nearest emergency room   Call 590     Warning signs that I or other people might notice when a crisis is developing for me:     I am having increasing suicidal thoughts that turn to plans with intent or means  I am having additional urges to self-harm    My emotions are of hopelessness; feeling like there's no way out.  Rage or anger.  Engaging in risky activities without thinking  Withdrawing from family/friends  Dramatic mood swings  Drastic personality changes   Use of alcohol or drugs  Postings on social media  Neglect of personal hygiene or cares     Things I am able to do on my own to cope or help me feel better:    Spending quality time with loved ones  Staying hydrated  Eating balanced meals  Going for a walk every day  Take care of daily responsibilities/needs  Focus on positive self-talk vs negative self-talk    Things that I am able to do with others to cope or help me better:   Music  Deep breathing  Meditations  Journal  Self-regulate  Self check-in  Ask for help  Exercise    Things I can use or do for distraction:   Reach out to/spend time with family, friends  Shower  Exercise  Chores or do a project  Listen to music  Watch movie/TV  Listening to music  Journaling  Reading a book  Meditating  Call a friend    Changes I can make to support my mental health and wellness:    -I will abstain from all mood altering chemicals not currently prescribed to me   -I will attend scheduled mental health therapy and psychiatric appointments and follow all   recommendations  -I will commit to 30 minutes of  self care daily - this can be as simple as taking a shower, going for a walk, cooking a meal, read, writing, etc  -I will practice square breathing when I begin to feel anxious - in breath through the nose for the count   of 4 and the first line on the square. Out breath through the mouth for the count of 4 for the second line   of the square. Repeat to complete the square. Repeat the square as many times as needed.  - I will use distraction skills of: going for walks, watching TV, spending time outside, calling a friend or family member  -Use community resources, including [employer] numbers, [facility] crisis and support meetings  -Maintain a daily schedule/routine  -Practice deep breathing skills  -Download a meditation kervin and spend 15-20 minutes per day mediating/relaxing. Some apps to   download include: Calm, Headspace and Insight Timer. All 3 of these apps have free version    Reduce Extreme Emotion  QUICKLY:  Changing Your Body Chemistry      T:  Change your body Temperature to change your autonomic nervous system   Use Ice Water to calm yourself down FAST   Put your face in a bowl of ice water (this is the best way; have the person keep his/her face in ice water for 30-45 seconds - initial research is showing that the longer s/he can hold her/his face in the water, the better the response), or   Splash ice water on your face, or hold an ice pack on your face      I:  Intensely exercise to calm down a body revved up by emotion   Examples: running, walking fast, jumping, playing basketball, weight lifting, swimming, calisthenics, etc.   Engage in exercises that DO NOT include violent behaviors. Exercises that utilize violent behaviors tend to function as  behavioral rehearsal,  and rather than calming the person down, may actually  rev  the person up more, increasing the likelihood of violence, and lessening the likelihood that they will  burn off  energy     P:  Progressively relax your muscles   Starting with  your hands, moving to your forearms, upper arms, shoulders, neck, forehead, eyes, cheeks and lips, tongue and teeth, chest, upper back, stomach, buttocks, thighs, calves, ankles, feet   Tense (10 seconds,   of the way), then relax each muscle (all the way)   Notice the tension   Notice the difference when relaxed (by tensing first, and then relaxing, you are able to get a more thorough relaxation than by simply relaxing)      P: Paced breathing to relax   The standard technique is to begin with counting the number of steps one takes for a typical inhale, then counting the steps one takes for a typical exhale, and then lengthening the amount of steps for the exhalation by one or two steps.  OR  Repeat this pattern for 1-2 minutes  Inhale for four (4) seconds   Exhale for six (6) to eight (8) seconds   Research demonstrated that one can change one's overall level of anxiety by doing this exercise for even a few minutes per day      People in my life that I can ask for help:   Family  Friends  Providers    Your [facility] has a mental health crisis team you can call 24/7:   [facility] Crisis Line Number: [phone]  [facility] Mental Health Crisis: [phone] - Call the crisis line for immediate mental health support, 24 hours a day.   [facility] Crisis Line Number: [phone]  [facility] Crisis Line Number: [phone]  [facility] Crisis Line Number: [phone]   [facility] Crisis Line Number: [phone]  [place]: [phone]  [place]: [phone]  [facility] Crisis Number: [phone]  [facility] Crisis: [phone]  [facility] Crisis: [phone]    Other things that are important when I'm in crisis:   Ask for help    Additional resources and information:     Mental Health Apps  My3  https://myExecutive Caddiepp.org/    VirtualHopeBox  https://TravelSite.com.org/apps/virtual-hope-box/       Professionals or Agencies I Can Contact During A  "Crisis:       Crisis Lines  Call or Text 618 - National Suicide and Crisis Lifeline    Crisis Text Line  Text 113095  You will be connected with a trained live crisis counselor to provide support.    The Mikey Project (LGBTQ Youth Crisis Line)  1.935.642.2343  text START to 242-983    National [place] on Mental Illness (MAGY)  860.268.6735 or 2.091.MAGY.HELPS    National Suicide Prevention Lifeline at 6-530-317-GXWQ (6913)     Throughout  [place]: call **CRISIS (**416306)     Crisis Text Line: is available for free, 24/7 by texting [place] to 746547    Community Resources  Fast Tracker  Linking people to mental health and substance use disorder resources  ODEC.org     [place] Mental Health Warm Line  Peer to peer support  Monday thru Saturday, 12 pm to 10 pm  698.501.8170 or 1.548.652.5889  Text \"Support\" to 67405     National [place] on Mental Illness (www.[place].magy.org): 244.842.7297 or 205-707-9964     [facility] Phone (free remote counseling): 973.620.9615 Web address:   https://CareerImp.org/     www.[employer] (filter for insurance, gender preference, etc.)    [facility]   [phone]  Intake appointment will be virtual, following appointments can be in person or virtual.   **IMMEDIATE OPENINGS**    [facility]  [phone]  *offers individual therapy, medication management and Mental Health Case Workers; can self refer    [facility]  [phone]  *Immediate Openings    [facility]  [phone]  *Immediate Openings    [facility]  [phone]  *Immediate Openings    Please follow up with scheduled providers to ensure all necessary paperwork is filled out prior to your   scheduled telehealth appointments.     Coordinators from [facility] will be calling within two business days to ensure   that you have the resources you may need or provide assistance with scheduling " (Phone number: [phone].).    Remember: give the referrals 3 sessions prior to calling it quits. Do you trust them? Do you feel   understood? Do you think they can help? Check in with yourself after each session    Please reach out to the [facility]([phone]) regarding further mental health appointment needs for this emergency department visit.    [facility] SCHEDULING:  Today you were seen by a licensed mental health professional through [facility] ([facility])  for a crisis assessment in the Emergency Department at [facility].  It is recommended that you follow up with your estabished providers (psychiatrist, menta health therapist, and/or primary care doctor - as relevant) as soon as possible. Coordinators from [facility] will be calling you in the next 24-48 hours to ensure that you have the resources you need.  You can so contact [facility] coordinators directly at [phone].     [facility] maintains an extensive network of licensed behavioral health providers to connect patients with the services they need.  We do not charge providers a fee to participate in our referral network.  We match patients with providers based on a patient s specific needs, insurance coverage, and location.  Our first effort will be to refer you to a provider within your care system, and will utilize providers outside your care system as needed.       [facility]  Located in [place], [place]   Call Pablo Darby: [phone]  This is an outpatient program that also has individual, family, and medication services. They also do biofeedback.      [facility]  Various locations in [place] and [place]  [phone]  This is an outpatient or residential program for teens

## 2024-02-09 ENCOUNTER — NURSE TRIAGE (OUTPATIENT)
Dept: NURSING | Facility: CLINIC | Age: 24
End: 2024-02-09
Payer: MEDICARE

## 2024-02-09 ENCOUNTER — HOSPITAL ENCOUNTER (EMERGENCY)
Facility: HOSPITAL | Age: 24
Discharge: HOME | End: 2024-02-09
Attending: EMERGENCY MEDICINE | Admitting: EMERGENCY MEDICINE
Payer: MEDICARE

## 2024-02-09 VITALS
DIASTOLIC BLOOD PRESSURE: 101 MMHG | SYSTOLIC BLOOD PRESSURE: 164 MMHG | TEMPERATURE: 98.4 F | RESPIRATION RATE: 16 BRPM | HEART RATE: 98 BPM | OXYGEN SATURATION: 100 %

## 2024-02-09 DIAGNOSIS — R45.851 SUICIDAL IDEATION: ICD-10-CM

## 2024-02-09 PROBLEM — F39 UNSPECIFIED MOOD (AFFECTIVE) DISORDER (H): Status: ACTIVE | Noted: 2024-02-09

## 2024-02-09 PROCEDURE — 99285 EMERGENCY DEPT VISIT HI MDM: CPT

## 2024-02-09 ASSESSMENT — ACTIVITIES OF DAILY LIVING (ADL): ADLS_ACUITY_SCORE: 39

## 2024-02-10 NOTE — ED NOTES
Bed: JNFT17  Expected date: 2/9/24  Expected time: 7:19 PM  Means of arrival: Ambulance  Comments:  [facility] 23 F SI, no attempts, cooperative.

## 2024-02-10 NOTE — TELEPHONE ENCOUNTER
Pt calling that she is feeling anxious since pt woke this morning, states she was not acting herself.  Pt was not engaging in any activities all day long.  Pt states suicidal thoughts, no plan.  Pt is with her Mom at work.  Pt understands she needs to have her Mom take her to ED or if no way to get there to Call 911.  Pt understands.  Ira Zhang RN  [facility] Nurse Advisor    Reason for Disposition   Suicide thoughts, threats, attempts, or questions   [1] Depression symptoms (sadness, hopelessness, decreased energy) AND [2] unable to do any normal activities (e.g., self care, school, work; in comparison to baseline).    Additional Information   Negative: SEVERE difficulty breathing (e.g., struggling for each breath, speaks in single words)   Negative: Bluish (or gray) lips or face now   Negative: Difficult to awaken or acting confused (e.g., disoriented, slurred speech)   Negative: Violent behavior, or threatening to physically hurt or kill someone   Negative: Sounds like a life-threatening emergency to the triager   Negative: Chest pain   Negative: Palpitations, skipped heartbeat, or rapid heartbeat is main symptom   Negative: Cough is main symptom   Negative: Patient attempted suicide   Negative: Patient is threatening suicide now   Negative: Violent behavior, or threatening to physically hurt or kill someone   Negative: [1] Patient is very confused (disoriented, slurred speech) AND [2] no other adult (e.g., friend or family member) available   Negative: [1] Difficult to awaken or acting very confused (disoriented, slurred speech) AND [2] new-onset   Negative: Sounds like a life-threatening emergency to the triager   Negative: Depression is main symptom and is not threatening suicide   Negative: Threatening to physically hurt or kill someone    Protocols used: Anxiety and Panic Attack-A-AH, Suicide Eafrtmdj-E-EL

## 2024-02-10 NOTE — PLAN OF CARE
"Sadaf Ross  February 9, 2024  Plan of Care Hand-off Note     Patient Care Path: discharge    Plan for Care:   After therapeutic assessment, intervention and aftercare planning by ED care team and LM and in consultation with attending provider, the patient's circumstances and mental state were appropriate for outpatient management and return to group home.  Pt appears to be functioning at baseline and has ongoing sadness related to her father's passing from 3 year's ago.  Pt's intellectual disabilty likely impacts pt's ability to process death and move forward.  Pt denies any plan or intent to harm self.  Pt is with adult friend who plans on having pt stay with her tonight stating, \"we have a lot of fun and laughs together\".  Pt and friend joking and laughing in ED.  Pt has been to ED 5+ times this month and over 14 visits last month.  Pt in behavioral control at this time and able to safety plan.  Pt is her own guardian.  Pt appears satisfied with validation of feelings, coaching on calming and distraction activities and verbalizes understanding of plan.    Identified Goals and Safety Issues: suicidal thoughts, sadness, grieving    Overview:  Group [place] ;[phone] Friend Marcia          Legal Status:      Psychiatry Consult:       Updated Attending MD  regarding plan of care.           Lanie Mckeon        "

## 2024-02-10 NOTE — CONSULTS
"Diagnostic Evaluation Consultation  Crisis Assessment    Patient Name: Sadaf Ross  Age:  23 year old  Legal Sex: female  Gender Identity: female  Pronouns:   Race: White  Ethnicity: Not  or   Language: English      Patient was assessed: Virtual: Dragonplay Crisis Assessment Start Time: 2111 Crisis Assessment Stop Time: 2130  Patient location: [facility] EMERGENCY DEPARTMENT                             [id number]    Referral Data and Chief Complaint  Sadaf Ross presents to the ED with family/friends, via EMS. Patient is presenting to the ED for the following concerns: Suicidal ideation.   Factors that make the mental health crisis life threatening or complex are:  Pt is a 23 year old female who presents to ED with chief complaint of suicidal ideation.  Pt reports \"it is getting closer to the 3 year anniversary of my dad's death\".  Pt states she has thoughts of wanting to be with her dad.  Pt denies she wants to kill herself.  Pt has hx of intellectual difficullty, borderline personality disorder, depression and PTSD.  Pt has been to ED 5+ this month already and over 14 visits last month.  Pt lives in a group home.  She is her own guardian.  Pt has an established provider for therapy.  Pt is calm and in control..      Informed Consent and Assessment Methods  Explained the crisis assessment process, including applicable information disclosures and limits to confidentiality, assessed understanding of the process, and obtained consent to proceed with the assessment.  Assessment methods included conducting a formal interview with patient, review of medical records, collaboration with medical staff, and obtaining relevant collateral information from family and community providers when available.  : done     Patient response to interventions: verbalizes understanding  Coping skills were attempted to reduce the crisis:  Pt goes to ED     History of the Crisis   Signficant " "history of ED visits with similar presentation, complaint of sadness of missing her father and describes it as \"suicidal\".  Writer is unable to find documentation in medical chart showing evidence of history of any suicide attempt.  Pt has random episodes of biting herself or scratching herself in a suicide \"attempt\".  Pt is functioning at baseline.    Brief Psychosocial History  Family:  Single, Children no  Support System:  Step parent(s)  Employment Status:  disabled  Source of Income:  disability  Financial Environmental Concerns:  none  Current Hobbies:     Barriers in Personal Life:  cognitive limitations, mental health concerns    Significant Clinical History  Current Anxiety Symptoms:  excessive worry  Current Depression/Trauma:  sadness, thoughts of death/suicide  Current Somatic Symptoms:  excessive worry  Current Psychosis/Thought Disturbance:  impulsive, flight of ideas  Current Eating Symptoms:     Chemical Use History:  Alcohol: None  Benzodiazepines: None  Opiates: None  Cocaine: None  Marijuana: None   Past diagnosis:  ADHD, Anxiety Disorder, Bipolar Disorder, Depression, Personality Disorder, Suicide attempt(s), PTSD  Family history:  Anxiety Disorder, Depression  Past treatment:  Individual therapy, Case management, Psychiatric Medication Management, Supportive Living Environment (group home, custodial house, etc), [facility]patient Hospital[facility]  Details of most recent treatment:  Pt lives in a group home ([facility]) that is staffed 24/7. Pt has a PCA.  She has OP therapy and psychiatry.  Other relevant history:          Collateral Information  Is there collateral information: Yes     Collateral information name, relationship, phone number:  Marcia King  -- -- [phone].  Pt states this is her stepmother    What happened today: Pt coming to ED because of sadness and suicidal thoughts related to missing her father     What is different about patient's functioning: pt at " baseline     Concern about alcohol/drug use:      What do you think the patient needs:      Has patient made comments about wanting to kill themselves/others: yes    If d/c is recommended, can they take part in safety/aftercare planning:  yes    Additional collateral information:  Writer spoke to [facility] staff [phone],  said pt was out with friend and often detours to the ER, enjoys coming to the ER.  [facility] accepts pt when ready to return     Risk Assessment  [place] Suicide Severity Rating Scale Full Clinical Version:             [place] Suicide Severity Rating Scale Recent:   Suicidal Ideation (Recent)  Q1 Wished to be Dead (Past Month): no  Q2 Suicidal Thoughts (Past Month): yes  Q3 Suicidal Thought Method: no  Q4 Suicidal Intent without Specific Plan: no  Q5 Suicide Intent with Specific Plan: no  Level of Risk per Screen: low risk  Intensity of Ideation (Recent)  Most Severe Ideation Rating (Past 1 Month): 2  Frequency (Past 1 Month): 2-5 times in week  Duration (Past 1 Month): Less than 1 hour/some of the time  Controllability (Past 1 Month): Can control thoughts with little difficulty  Deterrents (Past 1 Month): Deterrents definitely stopped you from attempting suicide  Reasons for Ideation (Past 1 Month): Does not apply  Suicidal Behavior (Recent)  Actual Attempt (Past 3 Months): Yes  Has subject engaged in non-suicidal self-injurious behavior? (Past 3 Months): Yes  Interrupted Attempts (Past 3 Months): No  Preparatory Acts or Behavior (Past 3 Months): No    Environmental or Psychosocial Events: loss of a loved one, helplessness/hopelessness, impulsivity/recklessness, other life stressors  Protective Factors: Protective Factors: strong bond to family unit, community support, or employment, responsibilities and duties to others, including pets and children, lives in a responsibly safe and stable environment, help seeking, able to access care without barriers, reality testing ability, supportive  ongoing medical and mental health care relationships, constructive use of leisure time, enjoyable activities, resilience    Does the patient have thoughts of harming others? Feels Like Hurting Others: no  Previous Attempt to Hurt Others: no  Is the patient engaging in sexually inappropriate behavior?: no    Is the patient engaging in sexually inappropriate behavior?  no        Mental Status Exam   Affect: Appropriate  Appearance: Appropriate  Attention Span/Concentration: Attentive  Eye Contact: Engaged    Fund of Knowledge: Appropriate   Language /Speech Content: Fluent  Language /Speech Volume: Normal  Language /Speech Rate/Productions: Normal  Recent Memory: Intact  Remote Memory: Intact  Mood: Normal  Orientation to Person: Yes   Orientation to Place: Yes  Orientation to Time of Day: Yes  Orientation to Date: Yes     Situation (Do they understand why they are here?): Yes  Psychomotor Behavior: Normal  Thought Content: Clear  Thought Form: Intact     Mini-Cog Assessment  Number of Words Recalled:    Clock-Drawing Test:     Three Item Recall:    Mini-Cog Total Score:       Medication  Psychotropic medications:   Medication Orders - Psychiatric (From admission, onward)      None             Current Care Team  Patient Care Team:  [facility] as PCP - General (Clinic)  Chloe Alva APRN CNP as Nurse Practitioner (OB/Gyn)  Harley Esparza CNM as Assigned OBGYN Provider  Seble Jones PA-C as Physician Assistant    Diagnosis  Patient Active Problem List   Diagnosis Code    MENTAL HEALTH     Suicidal ideation R45.851    Suicidal ideations R45.851    Intellectual disability F79    Bipolar affective disorder, currently depressed, moderate (H) F31.32    Borderline personality disorder (H) F60.3    Morbid obesity (H) E66.01    Unspecified mood (affective) disorder (H24) F39       Primary Problem This Admission  Active Hospital Problems    Unspecified mood (affective) disorder (H24)       "*Borderline personality disorder (H)        Clinical Summary and Substantiation of Recommendations   After therapeutic assessment, intervention and aftercare planning by ED care team and [facility] and in consultation with attending provider, the patient's circumstances and mental state were appropriate for outpatient management and return to group home.  Pt appears to be functioning at baseline and has ongoing sadness related to her father's passing from 3 year's ago.  Pt's intellectual disabilty likely impacts pt's ability to process death and move forward.  Pt denies any plan or intent to harm self.  Pt is with adult friend who plans on having pt stay with her tonight stating, \"we have a lot of fun and laughs together\".  Pt and friend joking and laughing in ED.  Pt has been to ED 5+ times this month and over 14 visits last month.  Pt in behavioral control at this time and able to safety plan.  Pt is her own guardian.  Pt appears satisfied with validation of feelings, coaching on calming and distraction activities and verbalizes understanding of plan.                          Patient coping skills attempted to reduce the crisis:  Pt goes to ED    Disposition  Recommended disposition: Individual Therapy, Group Home, Medication Management        Reviewed case and recommendations with attending provider. Attending Name: Dr Andrew       Attending concurs with disposition: yes       Patient and/or validated legal guardian concurs with disposition:   yes       Final disposition:  discharge    Legal status on admission:      Assessment Details   Total duration spent with the patient: 19 min     CPT code(s) utilized: Non-Billable    Lanie Mckeon Psychotherapist  DEC - Triage & Transition Services  Callback: [phone]          "

## 2024-02-10 NOTE — ED TRIAGE NOTES
"Patient arrives with EMS from [facility] in [place] after calling 911 for increased suicidal thoughts. Patient reports she is having a hard time coping with the loss of her father as the 3 year anniversary of his death is coming up. Does not have a plan to harm self, states that she wishes she were dead. Has attempted to harm self in the past \"by cutting my throat with a butter knife\". Pt is cooperative at this time.    Also states she \"doesn't feel right\", frequently speaking with eyes closed. States that she feels she is about to have a seizure as she states has had them in the past. Denies any drug use or new medications.     Reports 9/10 chronic abdominal and back pain that she takes OTC analgesics for, has not taken any this evening.     Triage Assessment (Adult)       Row Name 02/09/24 1941          Triage Assessment    Airway WDL WDL        Respiratory WDL    Respiratory WDL WDL        Skin Circulation/Temperature WDL    Skin Circulation/Temperature WDL WDL        Cardiac WDL    Cardiac WDL WDL        Peripheral/Neurovascular WDL    Peripheral Neurovascular WDL WDL        Cognitive/Neuro/Behavioral WDL    Cognitive/Neuro/Behavioral WDL X                     "

## 2024-02-10 NOTE — ED PROVIDER NOTES
EMERGENCY DEPARTMENT ENCOUNTER      NAME: Sadaf Ross  AGE: 23 year old female  YOB: 2000  MRN: [id number]  EVALUATION DATE & TIME: 2/9/2024  7:34 PM    PCP: [facility]    ED PROVIDER: Ginna Andrew M.D.      Chief Complaint   Patient presents with    Suicidal     FINAL IMPRESSION:  1. Suicidal ideation      ED COURSE & MEDICAL DECISION MAKING:    Pertinent Labs & Imaging studies reviewed. (See chart for details)    ED Course as of 02/09/24 2231 Fri Feb 09, 2024 2010 I met with the patient to gather history and to perform my initial exam. We discussed plans for the ED course, including diagnostic testing and treatment.     2015 Patient is a pleasant 23-year-old female comes in today for evaluation of suicidal ideation.  She noticed it around 6 PM this evening.  This is the third anniversary of her dad's death.  She is just having thoughts.  She does not have a plan.  She also has this feeling like she might pass out.  She has been taking all her medications as prescribed.  She has been eating and drinking okay.  This feeling that she might pass out is happened before.  I talked to her about getting a DEC assessment she agreed to do so.  She denies any drugs or alcohol use.   2203 Spoke with the DEC .  They evaluated the patient and feel very comfortable with her discharging home.  I think that is a great plan.  Group home so they would take her back.       Medical Decision Making    History:  Supplemental history: None.  External Record(s) reviewed: I reviewed the note from the DEC  from 2/6/2024.  Patient presented with suicidal ideation.  At that time the trigger had been that her sister and father had passed away in the past and she was having increased memories of that.  After talking to the DEC  she ended up going home.    Work Up:  Emergent/Severe conditions considered and evaluated for: Suicidal ideation, psychosis, danger to self  I independently  reviewed and interpreted none  In additional to work up documented, I considered the following work up: None  Medications given that require intensive monitoring for toxicity: None    External consultation:  Discussion of management with another provider: DEC     Complicating factors:  Care impacted by chronic illness: Mental Health, Morbid obesity  Care affected by social determinants of health: None    Disposition considerations: Discharge  Prescriptions considered/prescribed: Continue home medications.    At the conclusion of the encounter I discussed  the results of all of the tests and the disposition with patient.   All questions were answered.  The patient acknowledged understanding and was involved in the decision making regarding the overall care plan.      I discussed with patient the utility, limitations and findings of the exam/interventions/studies done during this visit as well as the list of differential diagnosis and symptoms to monitor/return to ER for.  Additional verbal discharge instructions were provided.     MEDICATIONS GIVEN IN THE EMERGENCY:  Medications - No data to display    NEW PRESCRIPTIONS STARTED AT TODAY'S ER VISIT  Discharge Medication List as of 2/9/2024 10:19 PM             =================================================================    HPI    Triage Note:      Patient information was obtained from: Patient     Use of : N/A       Sadaf Ross is a 23 year old female with a pertinent medical history of MDD, history of suicide attempt, bipolar 1 disorder, bipolar affective disorder, borderline personality disorder, adjustment disorder with mixed disturbance of emotions and conduct, psychosis, 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     Patient reports having suicidal ideations since approximately 6:00 PM today without any plan. She states that she believes her suicidal ideations were triggered by the fact that the three year anniversary of her father's death is coming up soon. She endorses taking all of her medications as prescribed, and mentions that she took all of her evening and night medications for today prior to arrival. She denies any recent drug or alcohol use.     Patient endorses a PMH of seizures, but she denies knowing the cause. She endorses having light-headedness today and mentions she feels like she might have a seizure. She also endorses having generalized weakness within the ED. She denies any recent appetite change, or any other complications at this time.     PAST MEDICAL HISTORY:  Past Medical History:   Diagnosis Date    ADHD (attention deficit hyperactivity disorder)     Bipolar 1 disorder (H)     Borderline personality disorder (H)     Depression     Depressive disorder     Intellectual disability     Obesity     Syncope        PAST SURGICAL HISTORY:  Past Surgical History:   Procedure Laterality Date    APPENDECTOMY      APPENDECTOMY         CURRENT MEDICATIONS:    No current facility-administered medications for this encounter.    Current Outpatient Medications:     acetaminophen (TYLENOL) 325 MG tablet, Take 650 mg by mouth every 6 hours as needed for mild pain, Disp: , Rfl:     albuterol (PROAIR HFA/PROVENTIL HFA/VENTOLIN HFA) 108 (90 Base) MCG/ACT inhaler, Inhale 2 puffs into the lungs every 6 hours as needed for shortness of breath, wheezing or cough, Disp: 18 g, Rfl: 0    ARIPiprazole lauroxil ER (ARISTADA) 882 MG/3.2ML intra-muscular, Inject 882 mg into the muscle every 28 days On the 22nd of each month, Disp: , Rfl:     bisacodyl (DULCOLAX) 5 MG EC tablet, Take 1 tablet (5 mg) by mouth daily as needed for constipation, Disp: 30 tablet, Rfl: 0    calcium carbonate (TUMS) 500 MG chewable  tablet, Take 1 chew tab by mouth 2 times daily as needed for heartburn , Disp: , Rfl:     cyclobenzaprine (FLEXERIL) 10 MG tablet, Take 10 mg by mouth 3 times daily as needed for muscle spasms, Disp: , Rfl:     dicyclomine (BENTYL) 20 MG tablet, Take 20 mg by mouth 2 times daily as needed (dyspepsia), Disp: , Rfl:     docusate sodium (COLACE) 50 MG capsule, Take 100 mg by mouth 2 times daily, Disp: , Rfl:     famotidine (PEPCID) 20 MG tablet, Take 20 mg by mouth daily, Disp: , Rfl:     FLUoxetine (PROZAC) 40 MG capsule, Take 80 mg by mouth daily, Disp: , Rfl:     hydrocortisone, Perianal, (HYDROCORTISONE) 2.5 % cream, Place rectally 2 times daily as needed for hemorrhoids, Disp: 30 g, Rfl: 0    hydrOXYzine (ATARAX) 50 MG tablet, Take 50 mg by mouth 2 times daily as needed for anxiety, Disp: , Rfl:     ibuprofen (ADVIL/MOTRIN) 200 MG tablet, Take 2 tablets (400 mg) by mouth every 6 hours as needed for pain, Disp: 60 tablet, Rfl: 0    LORazepam (ATIVAN) 0.5 MG tablet, Take 0.5 mg by mouth once as needed for anxiety Give as needed any time she has the urge to call 911 or to visit the ER, Disp: , Rfl:     mupirocin (BACTROBAN) 2 % external ointment, Apply topically 3 times daily, Disp: 15 g, Rfl: 0    OLANZapine zydis (ZYPREXA) 5 MG ODT, Take 1 tablet (5 mg) by mouth At Bedtime, Disp: 30 tablet, Rfl: 0    ondansetron (ZOFRAN) 4 MG tablet, Take 1 tablet (4 mg) by mouth every 8 hours as needed, Disp: 15 tablet, Rfl: 0    ondansetron (ZOFRAN) 4 MG tablet, Take 4 mg by mouth every 8 hours as needed for nausea, Disp: , Rfl:     pantoprazole (PROTONIX) 40 MG EC tablet, Take 40 mg by mouth daily, Disp: , Rfl:     polyethylene glycol (MIRALAX) 17 GM/Dose powder, Take 17 g by mouth daily, Disp: , Rfl:     promethazine (PHENERGAN) 12.5 MG tablet, Take 12.5 mg by mouth every 4 hours, Disp: , Rfl:     sennosides (SENOKOT) 8.6 MG tablet, Take 1 tablet by mouth 2 times daily as needed for constipation, Disp: , Rfl:     traZODone  (DESYREL) 50 MG tablet, Take 100 mg by mouth At Bedtime, Disp: , Rfl:     Vitamin D, Cholecalciferol, 25 MCG (1000 UT) TABS, Take 1,000 Units by mouth daily , Disp: , Rfl:     ALLERGIES:  Allergies   Allergen Reactions    Penicillins Rash and Unknown       FAMILY HISTORY:  Family History   Problem Relation Age of Onset    Diabetes Type 1 Father     Cancer Paternal Grandfather        SOCIAL HISTORY:   Social History     Socioeconomic History    Marital status: Single   Tobacco Use    Smoking status: Some Days     Packs/day: 0.25     Years: 5.00     Additional pack years: 0.00     Total pack years: 1.25     Types: Cigarettes    Smokeless tobacco: Never   Substance and Sexual Activity    Alcohol use: No    Drug use: No    Sexual activity: Not Currently     Partners: Female, Male     Birth control/protection: Pill, Injection       PHYSICAL EXAM    VITAL SIGNS: BP (!) 164/101   Pulse 98   Temp 98.4  F (36.9  C) (Oral)   Resp 16   LMP  (LMP Unknown)   SpO2 100%    GENERAL: Awake, alert, answering questions appropriately.   SPEECH:  Easy to understand speech, Normal volume and allyn  PULMONARY: No respiratory distress, Lungs clear to auscultation bilaterally  CARDIOVASCULAR: Regular rate and rhythm, Distal pulses present and normal.  ABDOMINAL: Soft, Nondistended, Nontender, No rebound or guarding, No palpable masses  EXTREMITIES: No lower extremity edema.  PSYCH: Normal mood and affect       I, Sourav Andrade, am serving as a scribe to document services personally performed by Dr. Andrew based on my observation and the provider's statements to me. I, Ginna Andrew MD attest that Sourav Andrade is acting in a scribe capacity, has observed my performance of the services and has documented them in accordance with my direction.    Ginna Andrew M.D.  Emergency Medicine  [facility] EMERGENCY DEPARTMENT  [address]  [address]  [phone]  Dept: [phone]       Ginna Andrew MD  02/09/24 6525

## 2024-02-10 NOTE — DISCHARGE INSTRUCTIONS
You were seen in the Emergency Department today for suicidal ideation.  Continue your medications as prescribed. Follow up with your primary care physician to ensure resolution of symptoms. Return if you have new or worsening symptoms.

## 2024-02-12 ENCOUNTER — HOSPITAL ENCOUNTER (EMERGENCY)
Facility: HOSPITAL | Age: 24
Discharge: HOME | End: 2024-02-12
Attending: EMERGENCY MEDICINE | Admitting: EMERGENCY MEDICINE
Payer: MEDICARE

## 2024-02-12 VITALS
HEART RATE: 93 BPM | DIASTOLIC BLOOD PRESSURE: 97 MMHG | SYSTOLIC BLOOD PRESSURE: 142 MMHG | HEIGHT: 63 IN | TEMPERATURE: 98.6 F | RESPIRATION RATE: 16 BRPM | OXYGEN SATURATION: 100 % | BODY MASS INDEX: 44.83 KG/M2 | WEIGHT: 253 LBS

## 2024-02-12 DIAGNOSIS — R45.851 SUICIDAL IDEATION: ICD-10-CM

## 2024-02-12 DIAGNOSIS — R19.7 DIARRHEA, UNSPECIFIED TYPE: ICD-10-CM

## 2024-02-12 DIAGNOSIS — R11.0 NAUSEA: ICD-10-CM

## 2024-02-12 LAB
ALBUMIN SERPL BCG-MCNC: 4.9 G/DL (ref 3.5–5.2)
ALBUMIN UR-MCNC: NEGATIVE MG/DL
ALP SERPL-CCNC: 72 U/L (ref 40–150)
ALT SERPL W P-5'-P-CCNC: 27 U/L (ref 0–50)
ANION GAP SERPL CALCULATED.3IONS-SCNC: 13 MMOL/L (ref 7–15)
APPEARANCE UR: CLEAR
AST SERPL W P-5'-P-CCNC: 18 U/L (ref 0–45)
BASE EXCESS BLDV CALC-SCNC: -1.1 MMOL/L (ref -3–3)
BASOPHILS # BLD AUTO: 0 10E3/UL (ref 0–0.2)
BASOPHILS NFR BLD AUTO: 0 %
BILIRUB SERPL-MCNC: 0.3 MG/DL
BILIRUB UR QL STRIP: NEGATIVE
BUN SERPL-MCNC: 7.8 MG/DL (ref 6–20)
CALCIUM SERPL-MCNC: 9.4 MG/DL (ref 8.6–10)
CHLORIDE SERPL-SCNC: 105 MMOL/L (ref 98–107)
COLOR UR AUTO: COLORLESS
CREAT SERPL-MCNC: 0.83 MG/DL (ref 0.51–0.95)
DEPRECATED HCO3 PLAS-SCNC: 23 MMOL/L (ref 22–29)
EGFRCR SERPLBLD CKD-EPI 2021: >90 ML/MIN/1.73M2
EOSINOPHIL # BLD AUTO: 0.1 10E3/UL (ref 0–0.7)
EOSINOPHIL NFR BLD AUTO: 1 %
ERYTHROCYTE [DISTWIDTH] IN BLOOD BY AUTOMATED COUNT: 13.2 % (ref 10–15)
GLUCOSE SERPL-MCNC: 88 MG/DL (ref 70–99)
GLUCOSE UR STRIP-MCNC: NEGATIVE MG/DL
HCG SERPL QL: NEGATIVE
HCO3 BLDV-SCNC: 25 MMOL/L (ref 21–28)
HCT VFR BLD AUTO: 38.7 % (ref 35–47)
HGB BLD-MCNC: 13 G/DL (ref 11.7–15.7)
HGB UR QL STRIP: NEGATIVE
IMM GRANULOCYTES # BLD: 0 10E3/UL
IMM GRANULOCYTES NFR BLD: 1 %
KETONES UR STRIP-MCNC: NEGATIVE MG/DL
LACTATE SERPL-SCNC: 0.7 MMOL/L (ref 0.7–2)
LEUKOCYTE ESTERASE UR QL STRIP: NEGATIVE
LIPASE SERPL-CCNC: 33 U/L (ref 13–60)
LYMPHOCYTES # BLD AUTO: 1.9 10E3/UL (ref 0.8–5.3)
LYMPHOCYTES NFR BLD AUTO: 23 %
MCH RBC QN AUTO: 27.4 PG (ref 26.5–33)
MCHC RBC AUTO-ENTMCNC: 33.6 G/DL (ref 31.5–36.5)
MCV RBC AUTO: 82 FL (ref 78–100)
MONOCYTES # BLD AUTO: 0.3 10E3/UL (ref 0–1.3)
MONOCYTES NFR BLD AUTO: 3 %
MUCOUS THREADS #/AREA URNS LPF: PRESENT /LPF
NEUTROPHILS # BLD AUTO: 5.9 10E3/UL (ref 1.6–8.3)
NEUTROPHILS NFR BLD AUTO: 72 %
NITRATE UR QL: NEGATIVE
NRBC # BLD AUTO: 0 10E3/UL
NRBC BLD AUTO-RTO: 0 /100
O2/TOTAL GAS SETTING VFR VENT: 24 %
OXYHGB MFR BLDV: 38 % (ref 70–75)
PCO2 BLDV: 49 MM HG (ref 40–50)
PH BLDV: 7.32 [PH] (ref 7.32–7.43)
PH UR STRIP: 6 [PH] (ref 5–7)
PLATELET # BLD AUTO: 237 10E3/UL (ref 150–450)
PO2 BLDV: 25 MM HG (ref 25–47)
POTASSIUM SERPL-SCNC: 3.8 MMOL/L (ref 3.4–5.3)
PROT SERPL-MCNC: 8.2 G/DL (ref 6.4–8.3)
RBC # BLD AUTO: 4.75 10E6/UL (ref 3.8–5.2)
RBC URINE: <1 /HPF
SAO2 % BLDV: 38.6 % (ref 70–75)
SODIUM SERPL-SCNC: 141 MMOL/L (ref 135–145)
SP GR UR STRIP: 1.01 (ref 1–1.03)
SQUAMOUS EPITHELIAL: <1 /HPF
TROPONIN T SERPL HS-MCNC: <6 NG/L
UROBILINOGEN UR STRIP-MCNC: <2 MG/DL
WBC # BLD AUTO: 8.2 10E3/UL (ref 4–11)
WBC URINE: <1 /HPF

## 2024-02-12 PROCEDURE — 99284 EMERGENCY DEPT VISIT MOD MDM: CPT

## 2024-02-12 PROCEDURE — 84484 ASSAY OF TROPONIN QUANT: CPT | Performed by: EMERGENCY MEDICINE

## 2024-02-12 PROCEDURE — 80053 COMPREHEN METABOLIC PANEL: CPT | Performed by: EMERGENCY MEDICINE

## 2024-02-12 PROCEDURE — 84703 CHORIONIC GONADOTROPIN ASSAY: CPT | Performed by: EMERGENCY MEDICINE

## 2024-02-12 PROCEDURE — 36415 COLL VENOUS BLD VENIPUNCTURE: CPT | Performed by: EMERGENCY MEDICINE

## 2024-02-12 PROCEDURE — 81001 URINALYSIS AUTO W/SCOPE: CPT | Performed by: EMERGENCY MEDICINE

## 2024-02-12 PROCEDURE — 83605 ASSAY OF LACTIC ACID: CPT | Performed by: EMERGENCY MEDICINE

## 2024-02-12 PROCEDURE — 93005 ELECTROCARDIOGRAM TRACING: CPT

## 2024-02-12 PROCEDURE — 93005 ELECTROCARDIOGRAM TRACING: CPT | Performed by: EMERGENCY MEDICINE

## 2024-02-12 PROCEDURE — 250N000011 HC RX IP 250 OP 636: Performed by: EMERGENCY MEDICINE

## 2024-02-12 PROCEDURE — 82805 BLOOD GASES W/O2 SATURATION: CPT | Performed by: EMERGENCY MEDICINE

## 2024-02-12 PROCEDURE — 258N000003 HC RX IP 258 OP 636: Performed by: EMERGENCY MEDICINE

## 2024-02-12 PROCEDURE — 83690 ASSAY OF LIPASE: CPT | Performed by: EMERGENCY MEDICINE

## 2024-02-12 PROCEDURE — 85025 COMPLETE CBC W/AUTO DIFF WBC: CPT | Performed by: EMERGENCY MEDICINE

## 2024-02-12 RX ORDER — ONDANSETRON 2 MG/ML
4 INJECTION INTRAMUSCULAR; INTRAVENOUS ONCE
Status: COMPLETED | OUTPATIENT
Start: 2024-02-12 | End: 2024-02-12

## 2024-02-12 ASSESSMENT — ACTIVITIES OF DAILY LIVING (ADL)
ADLS_ACUITY_SCORE: 39
ADLS_ACUITY_SCORE: 39

## 2024-02-12 NOTE — ED NOTES
2/12/2024  Sadaf Ross 2000     Writer consulted with Nurse,  on this date at  5:03 PM. It was determined that pt would not benefit from assessment at this time due to MD no longer requesting DEC assessment     DEC order has been closed at this time.    Adelaide Carter, LICSW

## 2024-02-12 NOTE — ED PROVIDER NOTES
EMERGENCY DEPARTMENT ENCOUNTER      NAME: Sadaf Ross  AGE: 23 year old female  YOB: 2000  MRN: [id number]  EVALUATION DATE & TIME: 2/12/2024  3:04 PM    PCP: [facility]    ED PROVIDER: Kristina Ho M.D.      Chief Complaint   Patient presents with    Fatigue    near syncope    Mental Health Problem       FINAL IMPRESSION:  1. Nausea    2. Diarrhea, unspecified type    3. Suicidal ideation        ED COURSE & MEDICAL DECISION MAKING:    Nausea, near-syncope, diarrhea, suicidal ideation.  Patient does not meet involuntary hold criteria based on my exam. DEC consulted but patient decided against any intervention and was signed out AGAINST MEDICAL ADVICE before lab work resulted.      3:15 PM I met with the patient to gather history and to perform my initial exam. I discussed the plan for care while in the Emergency Department.  4:39 PM Reevaluated the patient. Patient would like to leave AMA.  Patient has decision-making capacity and does not have a guardian, she makes her own medical decisions.  She denies any suicidal ideation at this time. I strongly encouraged her to return to ED if she changes her mind.  4:56 PM Patient signed AMA papers and left AMA.       Pertinent Labs & Imaging studies reviewed. (See chart for details).    Medical Decision Making  Obtained supplemental history:Supplemental history obtained?: No  Reviewed external records: External records reviewed?: Documented in chart  Care impacted by chronic illness:Chronic Pain and Mental Health  Care significantly affected by social determinants of health:Access to Medical Care  Did you consider but not order tests?: Work up considered but not performed and documented in chart, if applicable  Did you interpret images independently?: Independent interpretation of ECG and images noted in documentation, when applicable.  Consultation discussion with other provider:Did you involve another provider (consultant, , pharmacy, etc.)?:  "No  AMA    At the conclusion of the encounter I discussed the results of all of the tests and the disposition. The questions were answered. The patient or family acknowledged understanding and was agreeable with the care plan.      CRITICAL CARE:  N/A    HPI    Patient information was obtained from: patient    Use of : N/A        Sadaf Ross is a 23 year old female who presents fatigue and suicidal ideation.    Patient reports suicidal ideation today with plans of biting herself and \"hitting everybody\" in attempts to get other people to hurt her. She also has plans to hit her head against the wall. She has tried to attempt suicide within the past month by running into traffic. She has history of suicidal ideation and has tried to overdose on her medications. She does see psychiatrist and therapist every 2 weeks but currently doesn't have an upcoming appointment now. During exam, patient had 5 seconds of whole body trembling while her eyes were closed. When told to stop, she stopped trembling and opened her eyes hence returning to normal baseline.     Patient also reports persistent fatigue and feelings of pre-syncope for the past couple of days. She denies any syncope. She did not eat this morning but did eat last evening. She also has some generalized abdominal pain and distention, nausea, chills, and diarrhea x12 that started today. She denies any new foods, new changes in medications, or sick contacts. She was previously on a 7 day course of antibiotics and finished the course between this week.     She otherwise denies associating cough, sore throat, fever, homicidal ideation, any chance of pregnancy, and urinary symptoms. She is not sexually active. She denies access to fire arms. She lives in a group home (only 1 other person lives in the group home). There were no other concerns/complaints at this time.         REVIEW OF SYSTEMS  All other systems negative unless noted in HPI.    PAST MEDICAL " HISTORY:  Past Medical History:   Diagnosis Date    ADHD (attention deficit hyperactivity disorder)     Bipolar 1 disorder (H)     Borderline personality disorder (H)     Depression     Depressive disorder     Intellectual disability     Obesity     Syncope        PAST SURGICAL HISTORY:  Past Surgical History:   Procedure Laterality Date    APPENDECTOMY      APPENDECTOMY           CURRENT MEDICATIONS:    No current facility-administered medications for this encounter.     Current Outpatient Medications   Medication    acetaminophen (TYLENOL) 325 MG tablet    albuterol (PROAIR HFA/PROVENTIL HFA/VENTOLIN HFA) 108 (90 Base) MCG/ACT inhaler    ARIPiprazole lauroxil ER (ARISTADA) 882 MG/3.2ML intra-muscular    bisacodyl (DULCOLAX) 5 MG EC tablet    calcium carbonate (TUMS) 500 MG chewable tablet    cyclobenzaprine (FLEXERIL) 10 MG tablet    dicyclomine (BENTYL) 20 MG tablet    docusate sodium (COLACE) 50 MG capsule    famotidine (PEPCID) 20 MG tablet    FLUoxetine (PROZAC) 40 MG capsule    hydrocortisone, Perianal, (HYDROCORTISONE) 2.5 % cream    hydrOXYzine (ATARAX) 50 MG tablet    ibuprofen (ADVIL/MOTRIN) 200 MG tablet    LORazepam (ATIVAN) 0.5 MG tablet    mupirocin (BACTROBAN) 2 % external ointment    OLANZapine zydis (ZYPREXA) 5 MG ODT    ondansetron (ZOFRAN) 4 MG tablet    ondansetron (ZOFRAN) 4 MG tablet    pantoprazole (PROTONIX) 40 MG EC tablet    polyethylene glycol (MIRALAX) 17 GM/Dose powder    promethazine (PHENERGAN) 12.5 MG tablet    sennosides (SENOKOT) 8.6 MG tablet    traZODone (DESYREL) 50 MG tablet    Vitamin D, Cholecalciferol, 25 MCG (1000 UT) TABS         ALLERGIES:  Allergies   Allergen Reactions    Penicillins Rash and Unknown       FAMILY HISTORY:  Family History   Problem Relation Age of Onset    Diabetes Type 1 Father     Cancer Paternal Grandfather        SOCIAL HISTORY:  Social History     Socioeconomic History    Marital status: Single   Tobacco Use    Smoking status: Some Days      "Packs/day: 0.25     Years: 5.00     Additional pack years: 0.00     Total pack years: 1.25     Types: Cigarettes    Smokeless tobacco: Never   Substance and Sexual Activity    Alcohol use: No    Drug use: No    Sexual activity: Not Currently     Partners: Female, Male     Birth control/protection: Pill, Injection       VITALS:  No data found.      PHYSICAL EXAM    VITAL SIGNS: BP (!) 142/97   Pulse 93   Temp 98.6  F (37  C)   Resp 16   Ht 1.6 m (5' 3\")   Wt 114.8 kg (253 lb)   LMP  (LMP Unknown)   SpO2 100%   BMI 44.82 kg/m    Physical Exam  Vitals and nursing note reviewed.   Constitutional:       Appearance: She is obese.   HENT:      Head: Normocephalic and atraumatic.   Eyes:      General: No scleral icterus.        Right eye: No discharge.         Left eye: No discharge.      Pupils: Pupils are equal, round, and reactive to light.   Pulmonary:      Effort: Pulmonary effort is normal. No respiratory distress.      Breath sounds: No wheezing.   Abdominal:      General: There is no distension.      Palpations: Abdomen is soft.      Tenderness: There is no abdominal tenderness. There is no guarding or rebound.   Musculoskeletal:         General: No swelling or deformity.      Cervical back: Neck supple. No rigidity.      Right lower leg: No edema.      Left lower leg: No edema.   Skin:     General: Skin is warm and dry.      Capillary Refill: Capillary refill takes less than 2 seconds.      Findings: No bruising or erythema.   Psychiatric:         Mood and Affect: Affect is flat.      Comments: Flat affect, does not appear to be responding to internal stimuli. Initially cooperative but then on reexam was agitated and swearing at staff, swatted nurses hand away from her when she tried to prevent patient from pulling IV out.         LABS  Labs Ordered and Resulted from Time of ED Arrival to Time of ED Departure   ROUTINE UA WITH MICROSCOPIC REFLEX TO CULTURE - Abnormal       Result Value    Color Urine " Colorless      Appearance Urine Clear      Glucose Urine Negative      Bilirubin Urine Negative      Ketones Urine Negative      Specific Gravity Urine 1.013      Blood Urine Negative      pH Urine 6.0      Protein Albumin Urine Negative      Urobilinogen Urine <2.0      Nitrite Urine Negative      Leukocyte Esterase Urine Negative      Mucus Urine Present (*)     RBC Urine <1      WBC Urine <1      Squamous Epithelials Urine <1     BLOOD GAS VENOUS - Abnormal    pH Venous 7.32      pCO2 Venous 49      pO2 Venous 25      Bicarbonate Venous 25      Base Excess/Deficit Venous -1.1      FIO2 24      Oxyhemoglobin Venous 38 (*)     O2 Sat, Venous 38.6 (*)    COMPREHENSIVE METABOLIC PANEL - Normal    Sodium 141      Potassium 3.8      Carbon Dioxide (CO2) 23      Anion Gap 13      Urea Nitrogen 7.8      Creatinine 0.83      GFR Estimate >90      Calcium 9.4      Chloride 105      Glucose 88      Alkaline Phosphatase 72      AST 18      ALT 27      Protein Total 8.2      Albumin 4.9      Bilirubin Total 0.3     HCG QUALITATIVE PREGNANCY - Normal    hCG Serum Qualitative Negative     LACTIC ACID WHOLE BLOOD - Normal    Lactic Acid 0.7     LIPASE - Normal    Lipase 33     TROPONIN T, HIGH SENSITIVITY - Normal    Troponin T, High Sensitivity <6     CBC WITH PLATELETS AND DIFFERENTIAL    WBC Count 8.2      RBC Count 4.75      Hemoglobin 13.0      Hematocrit 38.7      MCV 82      MCH 27.4      MCHC 33.6      RDW 13.2      Platelet Count 237      % Neutrophils 72      % Lymphocytes 23      % Monocytes 3      % Eosinophils 1      % Basophils 0      % Immature Granulocytes 1      NRBCs per 100 WBC 0      Absolute Neutrophils 5.9      Absolute Lymphocytes 1.9      Absolute Monocytes 0.3      Absolute Eosinophils 0.1      Absolute Basophils 0.0      Absolute Immature Granulocytes 0.0      Absolute NRBCs 0.0           RADIOLOGY  No orders to display      NA      EKG:    EKG obtained at 1603 and shows sinus rhythm rate 69, Qtc 432,  compared to previous EKG on 12/19/23. Similar to previous EKG with t waves inverted in V1, III. I have independently reviewed and interpreted today's EKG, pending Cardiologist read.       PROCEDURES:  N/A      MEDICATIONS GIVEN IN THE EMERGENCY:  Medications   lactated ringers BOLUS 1,000 mL (1,000 mLs Intravenous Not Given 2/12/24 1635)   ondansetron (ZOFRAN) injection 4 mg (4 mg Intravenous Not Given 2/12/24 1635)       NEW PRESCRIPTIONS STARTED AT TODAY'S ER VISIT  Discharge Medication List as of 2/12/2024  4:55 PM           I, Teena Méndez, am serving as a scribe to document services personally performed by Kristina Ho MD, based on my observations and the provider's statements to me.  I, Kristina Ho MD, attest that Teena Méndez is acting in a scribe capacity, has observed my performance of the services and has documented them in accordance with my direction.     Kristina Ho MD  Emergency Medicine  [facility] EMERGENCY DEPARTMENT  [facility] [facility] [id number] [phone]  Dept: [phone]             Kristina Ho MD  02/14/24 [id number]

## 2024-02-12 NOTE — ED TRIAGE NOTES
"Pt presents to ED with 2 weeks of lightheadedness/fatigue/near syncopal episodes and chills.    Last time I was here they said I had a seizure. But says has never been diagnosed with seizures.     Pt states \"it feels like I am going to have a seizure right now\".     Pt screens high risk for suicide.   "

## 2024-02-13 ENCOUNTER — HOSPITAL ENCOUNTER (EMERGENCY)
Facility: HOSPITAL | Age: 24
Discharge: NURSING, REHAB OR LONG-TERM CARE | End: 2024-02-13
Attending: EMERGENCY MEDICINE | Admitting: EMERGENCY MEDICINE
Payer: MEDICARE

## 2024-02-13 VITALS
SYSTOLIC BLOOD PRESSURE: 130 MMHG | OXYGEN SATURATION: 99 % | TEMPERATURE: 98.1 F | DIASTOLIC BLOOD PRESSURE: 64 MMHG | RESPIRATION RATE: 16 BRPM | HEIGHT: 63 IN | HEART RATE: 96 BPM | WEIGHT: 254 LBS | BODY MASS INDEX: 45 KG/M2

## 2024-02-13 DIAGNOSIS — R45.89 THOUGHTS OF SELF HARM: ICD-10-CM

## 2024-02-13 PROCEDURE — 99285 EMERGENCY DEPT VISIT HI MDM: CPT

## 2024-02-13 ASSESSMENT — ACTIVITIES OF DAILY LIVING (ADL): ADLS_ACUITY_SCORE: 39

## 2024-02-14 ENCOUNTER — HOSPITAL ENCOUNTER (EMERGENCY)
Facility: HOSPITAL | Age: 24
Discharge: HOME | End: 2024-02-14
Attending: EMERGENCY MEDICINE | Admitting: EMERGENCY MEDICINE
Payer: MEDICARE

## 2024-02-14 VITALS
SYSTOLIC BLOOD PRESSURE: 137 MMHG | WEIGHT: 253 LBS | RESPIRATION RATE: 16 BRPM | HEIGHT: 63 IN | DIASTOLIC BLOOD PRESSURE: 76 MMHG | OXYGEN SATURATION: 100 % | HEART RATE: 100 BPM | BODY MASS INDEX: 44.83 KG/M2 | TEMPERATURE: 98.9 F

## 2024-02-14 DIAGNOSIS — F31.9 BIPOLAR DEPRESSION (H): ICD-10-CM

## 2024-02-14 PROBLEM — F31.32 BIPOLAR AFFECTIVE DISORDER, CURRENTLY DEPRESSED, MODERATE (H): Status: ACTIVE | Noted: 2021-07-12

## 2024-02-14 PROBLEM — F79 INTELLECTUAL DISABILITY: Chronic | Status: ACTIVE | Noted: 2021-07-12

## 2024-02-14 LAB
AMPHETAMINES UR QL SCN: ABNORMAL
BARBITURATES UR QL SCN: ABNORMAL
BENZODIAZ UR QL SCN: ABNORMAL
BZE UR QL SCN: ABNORMAL
CANNABINOIDS UR QL SCN: ABNORMAL
FENTANYL UR QL: ABNORMAL
HCG UR QL: NEGATIVE
OPIATES UR QL SCN: ABNORMAL
PCP QUAL URINE (ROCHE): ABNORMAL

## 2024-02-14 PROCEDURE — 99285 EMERGENCY DEPT VISIT HI MDM: CPT

## 2024-02-14 PROCEDURE — 80307 DRUG TEST PRSMV CHEM ANLYZR: CPT | Performed by: EMERGENCY MEDICINE

## 2024-02-14 PROCEDURE — 81025 URINE PREGNANCY TEST: CPT | Performed by: EMERGENCY MEDICINE

## 2024-02-14 ASSESSMENT — ACTIVITIES OF DAILY LIVING (ADL)
ADLS_ACUITY_SCORE: 39
ADLS_ACUITY_SCORE: 39

## 2024-02-14 NOTE — ED NOTES
Taxi called for pt. Per Dr. Andrew, pt is okay to discharge to waiting room and wait for taxi. Group home updated.

## 2024-02-14 NOTE — PROGRESS NOTES
1900 -Resume care for 1:1 safety Observation for Sadaf Ross who was transferred from triage to room. Per report pt came from group home via EMS, had suicidal ideation but no plans.vitals signs stable and last taken at 1838. She is calm and cooperative at this time. She decline meals. She stated she is not hungry. All  her belongings are with security. Will continue to observe / monitor  pt closely.  Shannan Jin RN

## 2024-02-14 NOTE — ED TRIAGE NOTES
Pt arrives via [facility] with c/o suicidal ideation. Per pt, she has thoughts of killing herself and has wished to be dead, but does not have a specific plan. VSS. Pt calm, cooperative. Room 8 made safe, 1:1 present in the room with pt      Triage Assessment (Adult)       Row Name 02/13/24 1822          Triage Assessment    Airway WDL WDL        Respiratory WDL    Respiratory WDL WDL        Skin Circulation/Temperature WDL    Skin Circulation/Temperature WDL WDL        Cardiac WDL    Cardiac WDL WDL        Peripheral/Neurovascular WDL    Peripheral Neurovascular WDL WDL        Cognitive/Neuro/Behavioral WDL    Cognitive/Neuro/Behavioral WDL X;mood/behavior     Level of Consciousness alert     Arousal Level opens eyes spontaneously     Orientation oriented x 4     Speech spontaneous;clear     Mood/Behavior sad        Pupils (CN II)    Pupil PERRLA yes     Pupil Size Left 3 mm     Pupil Size Right 3 mm        Coby Coma Scale    Best Eye Response 4-->(E4) spontaneous     Best Motor Response 6-->(M6) obeys commands     Best Verbal Response 5-->(V5) oriented     Coby Coma Scale Score 15

## 2024-02-14 NOTE — DISCHARGE INSTRUCTIONS
Seen today after having thoughts of self-harm by biting herself.  This was after you had Dr. Graham.  He was feeling better by the time I assessed you.  You can discharge back to group home.

## 2024-02-14 NOTE — ED NOTES
Bed: JNED-08  Expected date: 2/13/24  Expected time: 5:52 PM  Means of arrival: Ambulance  Comments:  [place]   23F  Suicidal Ideation   Spouse

## 2024-02-14 NOTE — ED PROVIDER NOTES
EMERGENCY DEPARTMENT ENCOUNTER      NAME: Sadaf Ross  AGE: 23 year old female  YOB: 2000  MRN: [id number]  EVALUATION DATE & TIME: 2/13/2024  6:15 PM    PCP: [facility]    ED PROVIDER: Ginna Andrew M.D.      Chief Complaint   Patient presents with    Suicidal         FINAL IMPRESSION:  1. Thoughts of self harm        ED COURSE & MEDICAL DECISION MAKING:    Pertinent Labs & Imaging studies reviewed. (See chart for details)  ED Course as of 02/13/24 1956 Tue Feb 13, 2024 1956 I am familiar with this patient.  I saw her 4 days ago.  She looks well here.  Her self-harm is biting and she does not even break the skin.  She is feeling better now.  She had an acute stressor that led to this.  She does not want to talk to DEC and I think that is reasonable.  She feels safe going home and I feel safe with her going back to her group home.  We will get her discharged.       Medical Decision Making    History:  Supplemental history from: None  External Record(s) reviewed: I reviewed the note from the DEC  from 4 days ago.  At that time patient reported she want to kill herself.  She met with DEC and ultimately discharged home.  She had multiple visits like this.    Work Up:  Emergent/Severe conditions considered and evaluated for: Suicidal ideation, injuries  I independently reviewed and interpreted none  In additional to work up documented, I considered the following work up: Consider DEC assessment but after discussing with patient we opted to just discharge home.  Medications given that require intensive monitoring for toxicity: None    External consultation:  Discussion of management with another provider: None    Complicating factors:  Care impacted by chronic illness: Intellectual disability, borderline personality disorder, depression, PTSD  Care affected by social determinants of health: None    Disposition considerations: Discharge  Prescriptions considered/prescribed:  None    At the conclusion of the encounter I discussed  the results of all of the tests and the disposition with patient.   All questions were answered.  The patient acknowledged understanding and was involved in the decision making regarding the overall care plan.      I discussed with patient the utility, limitations and findings of the exam/interventions/studies done during this visit as well as the list of differential diagnosis and symptoms to monitor/return to ER for.  Additional verbal discharge instructions were provided.     MEDICATIONS GIVEN IN THE EMERGENCY:  Medications - No data to display    NEW PRESCRIPTIONS STARTED AT TODAY'S ER VISIT  New Prescriptions    No medications on file          =================================================================    HPI    Triage Note: Pt arrives via [facility] with c/o suicidal ideation. Per pt, she has thoughts of killing herself and has wished to be dead, but does not have a specific plan. VSS. Pt calm, cooperative. Room 8 made safe, 1:1 present in the room with pt      Triage Assessment (Adult)       Row Name 02/13/24 1822          Triage Assessment    Airway WDL WDL        Respiratory WDL    Respiratory WDL WDL        Skin Circulation/Temperature WDL    Skin Circulation/Temperature WDL WDL        Cardiac WDL    Cardiac WDL WDL        Peripheral/Neurovascular WDL    Peripheral Neurovascular WDL WDL        Cognitive/Neuro/Behavioral WDL    Cognitive/Neuro/Behavioral WDL X;mood/behavior     Level of Consciousness alert     Arousal Level opens eyes spontaneously     Orientation oriented x 4     Speech spontaneous;clear     Mood/Behavior sad        Pupils (CN II)    Pupil PERRLA yes     Pupil Size Left 3 mm     Pupil Size Right 3 mm        Coby Coma Scale    Best Eye Response 4-->(E4) spontaneous     Best Motor Response 6-->(M6) obeys commands     Best Verbal Response 5-->(V5) oriented     Coby Coma Scale Score 15                   Patient  information was obtained from: Patient    Use of : N/A       Sadaf Ross is a 23 year old female who presents for evaluation of suicidal ideation.  She said that started 1 hour ago after she had been talking to her stepmom.  Sure previous attempts have been biting herself.  She lives in a group home in [place].  She has no history of hospitalization in the past.  She says she is feeling better now and she feels safe to go home.  The only thing she attempted to do was to bite herself.  She has no access to guns and has never had a more serious suicide attempt.    PAST MEDICAL HISTORY:  Past Medical History:   Diagnosis Date    ADHD (attention deficit hyperactivity disorder)     Bipolar 1 disorder (H)     Borderline personality disorder (H)     Depression     Depressive disorder     Intellectual disability     Obesity     Syncope        PAST SURGICAL HISTORY:  Past Surgical History:   Procedure Laterality Date    APPENDECTOMY      APPENDECTOMY         CURRENT MEDICATIONS:    No current facility-administered medications for this encounter.    Current Outpatient Medications:     acetaminophen (TYLENOL) 325 MG tablet, Take 650 mg by mouth every 6 hours as needed for mild pain, Disp: , Rfl:     albuterol (PROAIR HFA/PROVENTIL HFA/VENTOLIN HFA) 108 (90 Base) MCG/ACT inhaler, Inhale 2 puffs into the lungs every 6 hours as needed for shortness of breath, wheezing or cough, Disp: 18 g, Rfl: 0    ARIPiprazole lauroxil ER (ARISTADA) 882 MG/3.2ML intra-muscular, Inject 882 mg into the muscle every 28 days On the 22nd of each month, Disp: , Rfl:     bisacodyl (DULCOLAX) 5 MG EC tablet, Take 1 tablet (5 mg) by mouth daily as needed for constipation, Disp: 30 tablet, Rfl: 0    calcium carbonate (TUMS) 500 MG chewable tablet, Take 1 chew tab by mouth 2 times daily as needed for heartburn , Disp: , Rfl:     cyclobenzaprine (FLEXERIL) 10 MG tablet, Take 10 mg by mouth 3 times daily as needed for muscle spasms,  Disp: , Rfl:     dicyclomine (BENTYL) 20 MG tablet, Take 20 mg by mouth 2 times daily as needed (dyspepsia), Disp: , Rfl:     docusate sodium (COLACE) 50 MG capsule, Take 100 mg by mouth 2 times daily, Disp: , Rfl:     famotidine (PEPCID) 20 MG tablet, Take 20 mg by mouth daily, Disp: , Rfl:     FLUoxetine (PROZAC) 40 MG capsule, Take 80 mg by mouth daily, Disp: , Rfl:     hydrocortisone, Perianal, (HYDROCORTISONE) 2.5 % cream, Place rectally 2 times daily as needed for hemorrhoids, Disp: 30 g, Rfl: 0    hydrOXYzine (ATARAX) 50 MG tablet, Take 50 mg by mouth 2 times daily as needed for anxiety, Disp: , Rfl:     ibuprofen (ADVIL/MOTRIN) 200 MG tablet, Take 2 tablets (400 mg) by mouth every 6 hours as needed for pain, Disp: 60 tablet, Rfl: 0    LORazepam (ATIVAN) 0.5 MG tablet, Take 0.5 mg by mouth once as needed for anxiety Give as needed any time she has the urge to call 911 or to visit the ER, Disp: , Rfl:     mupirocin (BACTROBAN) 2 % external ointment, Apply topically 3 times daily, Disp: 15 g, Rfl: 0    OLANZapine zydis (ZYPREXA) 5 MG ODT, Take 1 tablet (5 mg) by mouth At Bedtime, Disp: 30 tablet, Rfl: 0    ondansetron (ZOFRAN) 4 MG tablet, Take 1 tablet (4 mg) by mouth every 8 hours as needed, Disp: 15 tablet, Rfl: 0    ondansetron (ZOFRAN) 4 MG tablet, Take 4 mg by mouth every 8 hours as needed for nausea, Disp: , Rfl:     pantoprazole (PROTONIX) 40 MG EC tablet, Take 40 mg by mouth daily, Disp: , Rfl:     polyethylene glycol (MIRALAX) 17 GM/Dose powder, Take 17 g by mouth daily, Disp: , Rfl:     promethazine (PHENERGAN) 12.5 MG tablet, Take 12.5 mg by mouth every 4 hours, Disp: , Rfl:     sennosides (SENOKOT) 8.6 MG tablet, Take 1 tablet by mouth 2 times daily as needed for constipation, Disp: , Rfl:     traZODone (DESYREL) 50 MG tablet, Take 100 mg by mouth At Bedtime, Disp: , Rfl:     Vitamin D, Cholecalciferol, 25 MCG (1000 UT) TABS, Take 1,000 Units by mouth daily , Disp: , Rfl:     ALLERGIES:  Allergies  "  Allergen Reactions    Penicillins Rash and Unknown       FAMILY HISTORY:  Family History   Problem Relation Age of Onset    Diabetes Type 1 Father     Cancer Paternal Grandfather        SOCIAL HISTORY:   Social History     Socioeconomic History    Marital status: Single   Tobacco Use    Smoking status: Some Days     Packs/day: 0.25     Years: 5.00     Additional pack years: 0.00     Total pack years: 1.25     Types: Cigarettes    Smokeless tobacco: Never   Substance and Sexual Activity    Alcohol use: No    Drug use: No    Sexual activity: Not Currently     Partners: Female, Male     Birth control/protection: Pill, Injection       PHYSICAL EXAM    VITAL SIGNS: /64   Pulse 96   Temp 98.1  F (36.7  C) (Oral)   Resp 16   Ht 1.6 m (5' 3\")   Wt 115.2 kg (254 lb)   LMP  (LMP Unknown)   SpO2 99%   BMI 44.99 kg/m     GENERAL: Awake, alert, answering questions appropriately, flat affect, superficial bite marks to her hand without breaking of skin or bleeding.  SPEECH:  Easy to understand speech, Normal volume and allyn  PULMONARY: No respiratory distress, Lungs clear to auscultation bilaterally  CARDIOVASCULAR: Regular rate and rhythm, Distal pulses present and normal.  ABDOMINAL: Soft, Nondistended, Nontender, No rebound or guarding, No palpable masses  EXTREMITIES: No lower extremity edema.  PSYCH: Flat affect     Ginna Andrew M.D.  Emergency Medicine  [facility] EMERGENCY DEPARTMENT  [address] [facility] [id number] [phone]  Dept: [phone]       Ginna Andrew MD  02/13/24 1956    "

## 2024-02-15 ENCOUNTER — HOSPITAL ENCOUNTER (EMERGENCY)
Facility: HOSPITAL | Age: 24
Discharge: NURSING, REHAB OR LONG-TERM CARE | End: 2024-02-16
Attending: EMERGENCY MEDICINE | Admitting: EMERGENCY MEDICINE
Payer: MEDICARE

## 2024-02-15 ENCOUNTER — MEDICAL CORRESPONDENCE (OUTPATIENT)
Dept: HEALTH INFORMATION MANAGEMENT | Facility: CLINIC | Age: 24
End: 2024-02-15

## 2024-02-15 VITALS — BODY MASS INDEX: 44.83 KG/M2 | HEIGHT: 63 IN | WEIGHT: 253 LBS

## 2024-02-15 DIAGNOSIS — R45.851 SUICIDAL IDEATION: ICD-10-CM

## 2024-02-15 DIAGNOSIS — F31.9 BIPOLAR DEPRESSION (H): ICD-10-CM

## 2024-02-15 PROCEDURE — 99283 EMERGENCY DEPT VISIT LOW MDM: CPT

## 2024-02-15 ASSESSMENT — ACTIVITIES OF DAILY LIVING (ADL): ADLS_ACUITY_SCORE: 39

## 2024-02-15 NOTE — ED PROVIDER NOTES
Emergency Department Encounter     Evaluation Date & Time:   2/14/2024  6:23 PM    CHIEF COMPLAINT:  Suicidal      Triage Note:       ED COURSE & MEDICAL DECISION MAKING:     Hx of bipolar, ADHD and SI in the past, here with suicidal ideations with thoughts of cutting herself over the past several hours.  Pt reports it's related to her father's death 3 years ago as today is the anniversary.  No actual attempts, but did bit right hand.  No open wound or penetration of skin/tissue.  No acute medical needs.  Pt has not been hospitalized in some time, denies drug/etoh and compliant with meds. Will get DEC consult, reassess.      Review of EMR shows numerous ED visits for similar BH concerns.  Suspect some of this is behavioral. Will await DEC assessment and recommendations.    ED Course as of 02/14/24 2146 Wed Feb 14, 2024 1939 Amphetamine +, likely related to medication as she reports ADHD treatment.  Hcg negative.   2052 I spoke to Sarina Wilkerson, DEC   2052 DEC completed evaluation and pt no longer having any sort of acute SI. Pt at baseline, would like to return to group home and group home is willing to take her back as well.  Pt discharged per plan.           Medical Decision Making    History:  Supplemental history from: EMS  External Record(s) reviewed: Documented in chart    Work Up:  Chart documentation includes differential considered and any EKGs or imaging independently interpreted by provider, where specified.  In additional to work up documented, I considered the following work up: Documented in chart, if applicable.    External consultation:  Discussion of management with another provider: Documented in chart, if applicable    Complicating factors:  Care impacted by chronic illness: Mental Health  Care affected by social determinants of health: Literacy    Disposition considerations: Discharge. No recommendations on prescription strength medication(s). See documentation for any additional  details.      At the conclusion of the encounter I discussed the results of all the tests and the disposition. The questions were answered. The patient or family acknowledged understanding and was agreeable with the care plan.      MEDICATIONS GIVEN IN THE EMERGENCY DEPARTMENT:  Medications - No data to display    NEW PRESCRIPTIONS STARTED AT TODAY'S ED VISIT:  New Prescriptions    No medications on file       [facility]     Sadaf Ross is a 23 year old female with a pertinent history of bipolar, ADHD, depression who presents to this ED via EMS for evaluation of  of increased suicidal ideations today.  Pt spoke with crisis line today and told to be seen.  PD evaluated her, called EMS and pt brought to us. She's voluntary, admits to suicidal thinking related to anniversary today of father's death 3 years ago.  Pt reports she held a knife to throat 3+ years ago, thinking of cutting herself, but no attempts today. Did bite right hand.  No other injuries or attempts.  Pt denies AV hallucinations, cp/sob, abd pain, n/v, cough.  Pt has not been hospitalized in quite some time.      REVIEW OF SYSTEMS:  See HPI      Medical History     Past Medical History:   Diagnosis Date    ADHD (attention deficit hyperactivity disorder)     Bipolar 1 disorder (H)     Borderline personality disorder (H)     Depression     Depressive disorder     Intellectual disability     Obesity     Syncope        Past Surgical History:   Procedure Laterality Date    APPENDECTOMY      APPENDECTOMY         Family History   Problem Relation Age of Onset    Diabetes Type 1 Father     Cancer Paternal Grandfather        Social History     Tobacco Use    Smoking status: Some Days     Packs/day: 0.25     Years: 5.00     Additional pack years: 0.00     Total pack years: 1.25     Types: Cigarettes    Smokeless tobacco: Never   Substance Use Topics    Alcohol use: No    Drug use: No       acetaminophen (TYLENOL) 325 MG tablet  albuterol (PROAIR  "HFA/PROVENTIL HFA/VENTOLIN HFA) 108 (90 Base) MCG/ACT inhaler  ARIPiprazole lauroxil ER (ARISTADA) 882 MG/3.2ML intra-muscular  bisacodyl (DULCOLAX) 5 MG EC tablet  calcium carbonate (TUMS) 500 MG chewable tablet  cyclobenzaprine (FLEXERIL) 10 MG tablet  dicyclomine (BENTYL) 20 MG tablet  docusate sodium (COLACE) 50 MG capsule  famotidine (PEPCID) 20 MG tablet  FLUoxetine (PROZAC) 40 MG capsule  hydrocortisone, Perianal, (HYDROCORTISONE) 2.5 % cream  hydrOXYzine (ATARAX) 50 MG tablet  ibuprofen (ADVIL/MOTRIN) 200 MG tablet  LORazepam (ATIVAN) 0.5 MG tablet  mupirocin (BACTROBAN) 2 % external ointment  OLANZapine zydis (ZYPREXA) 5 MG ODT  ondansetron (ZOFRAN) 4 MG tablet  ondansetron (ZOFRAN) 4 MG tablet  pantoprazole (PROTONIX) 40 MG EC tablet  polyethylene glycol (MIRALAX) 17 GM/Dose powder  promethazine (PHENERGAN) 12.5 MG tablet  sennosides (SENOKOT) 8.6 MG tablet  traZODone (DESYREL) 50 MG tablet  Vitamin D, Cholecalciferol, 25 MCG (1000 UT) TABS        Physical Exam     Vitals:  /76   Pulse 100   Temp 98.9  F (37.2  C) (Oral)   Resp 16   Ht 1.6 m (5' 3\")   Wt 114.8 kg (253 lb)   LMP  (LMP Unknown)   SpO2 100%   BMI 44.82 kg/m      PHYSICAL EXAM:   Physical Exam  Vitals and nursing note reviewed.   Constitutional:       General: She is not in acute distress.     Appearance: Normal appearance.   HENT:      Head: Normocephalic and atraumatic.      Nose: Nose normal.      Mouth/Throat:      Mouth: Mucous membranes are moist.   Eyes:      Extraocular Movements: Extraocular movements intact.   Cardiovascular:      Rate and Rhythm: Normal rate and regular rhythm.      Pulses: Normal pulses.           Radial pulses are 2+ on the right side and 2+ on the left side.        Dorsalis pedis pulses are 2+ on the right side and 2+ on the left side.   Pulmonary:      Effort: Pulmonary effort is normal.   Skin:     Findings: No rash.   Neurological:      General: No focal deficit present.      Mental Status: She " is alert. Mental status is at baseline.      Comments: Fluent speech   Psychiatric:         Mood and Affect: Mood normal.         Behavior: Behavior normal.         Thought Content: Thought content does not include homicidal or suicidal ideation.           Results     LAB:  All pertinent labs reviewed and interpreted  Labs Ordered and Resulted from Time of ED Arrival to Time of ED Departure   URINE DRUG SCREEN PANEL - Abnormal       Result Value    Amphetamines Urine Screen Positive (*)     Barbituates Urine Screen Negative      Benzodiazepine Urine Screen Negative      Cannabinoids Urine Screen Negative      Cocaine Urine Screen Negative      Fentanyl Qual Urine Screen Negative      Opiates Urine Screen Negative      PCP Urine Screen Negative     HCG QUALITATIVE URINE - Normal    hCG Urine Qualitative Negative         RADIOLOGY:  No orders to display                ECG:  none    PROCEDURES:  Procedures:  none      FINAL IMPRESSION:    ICD-10-CM    1. Bipolar depression (H)  F31.9           0 minutes of critical care time        Pacheco Marcos DO  Emergency Medicine  [facility] EMERGENCY DEPARTMENT  2/14/2024  6:33 PM          Pacheco Mracos MD  02/14/24 2141

## 2024-02-15 NOTE — CONSULTS
Diagnostic Evaluation Consultation  Crisis Assessment    Patient Name: Sadaf Ross  Age:  23 year old  Legal Sex: female  Gender Identity: female  Pronouns:   Race: White  Ethnicity: Not  or   Language: English      Patient was assessed: Virtual: [facility] Crisis Assessment Start Time: 2019 Crisis Assessment Stop Time: 2037  Patient location: [facility] EMERGENCY DEPARTMENT                             JNFT15    Referral Data and Chief Complaint  Sadaf Ross presents to the ED with family/friends, via EMS. Patient is presenting to the ED for the following concerns: Suicidal ideation.   Factors that make the mental health crisis life threatening or complex are:  Pt is a 23 year old female who presents to ED with chief complaint of suicidal ideation.  Pt reports that the 3rd year annivasery of her father's death is coming.  Pt states she has thoughts of wanting to die as she is having racing thoughts and excessive worry.  Pt denies she wants to kill herself.  Pt has hx of intellectual difficullty, borderline personality disorder, depression and PTSD.  Pt has been seen in the ED 9 times this month for similar complaints. .  Pt lives in a group home.  She is her own guardian.  Pt has an established provider for therapy.  Pt is calm and in control..      Informed Consent and Assessment Methods  Explained the crisis assessment process, including applicable information disclosures and limits to confidentiality, assessed understanding of the process, and obtained consent to proceed with the assessment.  Assessment methods included conducting a formal interview with patient, review of medical records, collaboration with medical staff, and obtaining relevant collateral information from family and community providers when available.  : done     Patient response to interventions:    Coping skills were attempted to reduce the crisis:  Pt goes to ED     History of the Crisis  "  Signficant history of ED visits with similar presentation, complaint of sadness of missing her father and describes it as \"suicidal\".  Writer is unable to find documentation in medical chart showing evidence of history of any suicide attempt.  Pt has random episodes of biting herself or scratching herself in a suicide \"attempt\".  Pt is functioning at baseline.    Brief Psychosocial History  Family:  Single, Children no  Support System:  Step parent(s)  Employment Status:  disabled  Source of Income:  disability  Financial Environmental Concerns:  none  Current Hobbies:  music, puzzles, group/social activities, television/movies/videos  Barriers in Personal Life:  cognitive limitations, mental health concerns    Significant Clinical History  Current Anxiety Symptoms:  excessive worry  Current Depression/Trauma:  sadness, thoughts of death/suicide  Current Somatic Symptoms:     Current Psychosis/Thought Disturbance:  impulsive, flight of ideas  Current Eating Symptoms:   (no concerns)  Chemical Use History:  Alcohol: None  Benzodiazepines: None  Opiates: None  Cocaine: None  Marijuana: None  Other Use: None   Past diagnosis:  ADHD, Anxiety Disorder, Bipolar Disorder, Depression, Personality Disorder, Suicide attempt(s), PTSD  Family history:  Anxiety Disorder, Depression  Past treatment:  Individual therapy, Case management, Psychiatric Medication Management, Supportive Living Environment (group home, skilled nursing house, etc), Inpatient Hospitalization, [facility]  Details of most recent treatment:  Pt lives in a group home ([facility] acute hospital) that is staffed 24/7. Pt has a PCA.  She has OP therapy and psychiatry. has a behavioral Analyst and states she thinks she has a mental health  as well.  Other relevant history:          Collateral Information  Is there collateral information: Yes     Collateral information name, relationship, phone number:  Spoke with group home staff at [phone]    What " "happened today: \"Nothing happened. I talked to her earlier today while she was hanging out with her friend Marcia in [place] and she told me she was feeling good. She told me she was not planning to go to the emergency department.\" Staff states patient is allowed to leave the group home and hangout with her her friend and that she usually then ends up coming to the ED with complaints of suicidal ideations.     What is different about patient's functioning: pt at baseline. \"She does this all the time. She is fixated on going to [facility] lately. She leaves the group home to go visit her friend who lives in [place] then ends up going to the emergency. She refuses to engage in outpatient services but likes to go to the hospital.\" Staff stated that patient can return to the group home if discharge is recommended.     Concern about alcohol/drug use:      What do you think the patient needs:      Has patient made comments about wanting to kill themselves/others: yes    If d/c is recommended, can they take part in safety/aftercare planning:  yes    Additional collateral information:        Risk Assessment  Wood Suicide Severity Rating Scale Full Clinical Version:  Suicidal Ideation  Q1 Wish to be Dead (Lifetime): Yes  Q2 Non-Specific Active Suicidal Thoughts (Lifetime): Yes  3. Active Suicidal Ideation with any Methods (Not Plan) Without Intent to Act (Lifetime): Yes  Q4 Active Suicidal Ideation with Some Intent to Act, Without Specific Plan (Lifetime): Yes  Q5 Active Suicidal Ideation with Specific Plan and Intent (Lifetime): Yes  Q6 Suicide Behavior (Lifetime): yes     Suicidal Behavior (Lifetime)  Actual Attempt (Lifetime): Yes  Total Number of Actual Attempts (Lifetime): 5  Has subject engaged in non-suicidal self-injurious behavior? (Lifetime): Yes  Interrupted Attempts (Lifetime): No  Aborted or Self-Interrupted Attempt (Lifetime): Yes  Total Number of Aborted or Self-Interrupted Attempts (Lifetime): " 5  Aborted or Self-Interrupted Attempt Description (Lifetime): Cut my neck with a surrated knife in 2018/2019  Preparatory Acts or Behavior (Lifetime): No    Person Suicide Severity Rating Scale Recent:   Suicidal Ideation (Recent)  Q1 Wished to be Dead (Past Month): yes  Q2 Suicidal Thoughts (Past Month): yes  Q3 Suicidal Thought Method: yes  Q4 Suicidal Intent without Specific Plan: no  Q5 Suicide Intent with Specific Plan: no  Within the Past 3 Months?: no  Level of Risk per Screen: moderate risk  Intensity of Ideation (Recent)  Most Severe Ideation Rating (Past 1 Month): 2  Description of Most Severe Ideation (Past 1 Month): Patient states her plan was to bite herself.  Frequency (Past 1 Month): 2-5 times in week  Duration (Past 1 Month): Less than 1 hour/some of the time  Controllability (Past 1 Month): Can control thoughts with little difficulty  Deterrents (Past 1 Month): Deterrents definitely stopped you from attempting suicide  Reasons for Ideation (Past 1 Month): Does not apply  Suicidal Behavior (Recent)  Actual Attempt (Past 3 Months): No  Has subject engaged in non-suicidal self-injurious behavior? (Past 3 Months): Yes (She bit her hand.)  Interrupted Attempts (Past 3 Months): No  Total Number of Interrupted Attempts (Past 3 Months): 1  Interrupted Attempt Description (Past 3 Months): Cutting wrists with sharp objects from car and keys.  Aborted or Self-Interrupted Attempt (Past 3 Months): Yes  Total Number of Aborted or Self-Interrupted Attempts (Past 3 Months): 1  Aborted or Self-Interrupted Attempt Description (Past 3 Months): Takes a lot of courage to stop myself and my thoughts. Pt reports still having reasons to live, mostly her sister and her 6 month old nephew  Preparatory Acts or Behavior (Past 3 Months): No  Total Number of Preparatory Acts (Past 3 Months): 0    Environmental or Psychosocial Events: loss of a loved one, helplessness/hopelessness, impulsivity/recklessness, other life  stressors  Protective Factors: Protective Factors: strong bond to family unit, community support, or employment, responsibilities and duties to others, including pets and children, lives in a responsibly safe and stable environment, help seeking, able to access care without barriers, reality testing ability, supportive ongoing medical and mental health care relationships, constructive use of leisure time, enjoyable activities, resilience    Does the patient have thoughts of harming others? Feels Like Hurting Others: no  Previous Attempt to Hurt Others: no  Current presentation:  (Patient is calm and cooperative.)  Is the patient engaging in sexually inappropriate behavior?: no    Is the patient engaging in sexually inappropriate behavior?  no        Mental Status Exam   Affect: Appropriate  Appearance: Appropriate  Attention Span/Concentration: Attentive  Eye Contact: Engaged    Fund of Knowledge: Appropriate   Language /Speech Content: Fluent  Language /Speech Volume: Normal  Language /Speech Rate/Productions: Normal  Recent Memory: Intact  Remote Memory: Intact  Mood: Normal  Orientation to Person: Yes   Orientation to Place: Yes  Orientation to Time of Day: Yes  Orientation to Date: Yes     Situation (Do they understand why they are here?): Yes  Psychomotor Behavior: Normal  Thought Content: Clear  Thought Form: Intact     Mini-Cog Assessment  Number of Words Recalled:    Clock-Drawing Test:     Three Item Recall:    Mini-Cog Total Score:       Medication  Psychotropic medications:   Medication Orders - Psychiatric (From admission, onward)      None             Current Care Team  Patient Care Team:  [facility] as PCP - General (Clinic)  Chloe Alva APRN CNP as Nurse Practitioner (OB/Gyn)  Harley Esparza CNM as Assigned OBGYN Provider  Seble Jones PA-C as Physician Assistant    Diagnosis  Patient Active Problem List   Diagnosis Code    MENTAL HEALTH     Suicidal ideation R45.852     "Suicidal ideations R45.851    Intellectual disability F79    Bipolar affective disorder, currently depressed, moderate (H) F31.32    Borderline personality disorder (H) F60.3    Morbid obesity (H) E66.01    Unspecified mood (affective) disorder (H24) F39       Primary Problem This Admission  Active Hospital Problems    *Unspecified mood (affective) disorder (H24)      Intellectual disability      Borderline personality disorder (H)        Clinical Summary and Substantiation of Recommendations   After therapeutic assessment, intervention and aftercare planning by ED care team and LM and in consultation with attending provider, the patient's circumstances and mental state were appropriate for outpatient management and return to group home. Pt appears to be functioning at baseline and has ongoing sadness related to her father's passing from 3 year's ago. Pt's intellectual disabilty likely impacts pt's ability to process death and move forward. Pt denies any plan or intent to harm self. Pt is with adult friend who plans on having pt stay with her tonight stating, \"we have a lot of fun and laughs together\". Pt and friend joking and laughing in ED. Pt has been to ED 9 times this month. Pt currently denies having suicidal ideations, intent or plans. She is able to safety plan. She declined to get referrals for IOP or PHP programs. Pt is her own guardian. Pt appears satisfied with validation of feelings, coaching on calming and distraction activities and verbalizes understanding of plan.      Patient coping skills attempted to reduce the crisis:  Pt goes to ED    Disposition  Recommended disposition: Individual Therapy, Group Home, Medication Management, Programmatic Care        Reviewed case and recommendations with attending provider. Attending Name: Dr Marcos       Attending concurs with disposition: yes       Patient and/or validated legal guardian concurs with disposition:   yes       Final disposition:  " discharge    Legal status on admission: Voluntary/Patient has signed consent for treatment    Assessment Details   Total duration spent with the patient: 18 min     CPT code(s) utilized: Non-Billable    YOSHI Vicente, Psychotherapist  DEC - Triage & Transition Services  Callback: [phone]

## 2024-02-15 NOTE — ED NOTES
Bed: JNFT15  Expected date:   Expected time:   Means of arrival:   Comments:  Isai 23 F SI cooperative

## 2024-02-15 NOTE — ED TRIAGE NOTES
"Pt arrives via [facility] for suicidal ideation. Pt was wandering around [facility] and a gas station on the phone making statements about wanting to kill herself. Per pt, she was on the phone with the crisis hotline (797) discussing her desire to be dead. She is approaching the 3rd anniversary of her dad's death and isn't coping very well, per pt. Pt has prior suicide attempt in 2021 where she started cutting her neck. She stopped when her mom intervened. Pt reports wanting to cut her neck and her wrists. She's been biting herself today wanting to \"bleed out\". States she does not have a good relationship with her mother, who is an alcoholic.     Triage Assessment (Adult)       Row Name 02/14/24 0678          Triage Assessment    Airway WDL WDL        Respiratory WDL    Respiratory WDL WDL        Skin Circulation/Temperature WDL    Skin Circulation/Temperature WDL WDL        Cardiac WDL    Cardiac WDL WDL        Peripheral/Neurovascular WDL    Peripheral Neurovascular WDL WDL        Cognitive/Neuro/Behavioral WDL    Cognitive/Neuro/Behavioral WDL WDL                     "

## 2024-02-16 NOTE — DISCHARGE INSTRUCTIONS
You were seen in the Emergency Department today for mental health concerns.     I would recommend you take your medications as prescribed and use your group home and mental health support system. Please follow their recommendations because they have your best interests at heart.     Return to the ER if you have thoughts of hurting yourself or others or other new concerns.

## 2024-02-16 NOTE — ED TRIAGE NOTES
"Pt arrives via ems from home with c/o suicidal ideation. Does have plan \"biting myself and hitting my head on the wall\"        "

## 2024-02-16 NOTE — PROGRESS NOTES
Pt states she would like to return home.  Pt has not been cooperative with giving author any information regarding her thoughts/feelings.  Pt is refusing DEC assessment at this time.  Pt has been deemed safe to discharge back to her group home per MD and group home conversation.  Perez umñoz has been requested to return pt back to her group home.

## 2024-02-16 NOTE — ED PROVIDER NOTES
"EMERGENCY DEPARTMENT ENCOUNTER      NAME: Sadaf Ross  YOB: 2000  MRN: [id number]    FINAL IMPRESSION  1. Bipolar depression (H)    2. Suicidal ideation        MEDICAL DECISION MAKING   Pertinent Labs & Imaging studies reviewed. (See chart for details)    Sadaf Ross is a 23 year old female who presents via EMS for evaluation of suicidal ideation.  Records reviewed.  Patient has a history of bipolar disorder, ADHD, and chronic SI.  She has been seen here in the ED numerous times with related complaints, actually has been here every day so far this week starting on 2/12.  She was also seen here on 2/9/2024.  Most recently, patient was seen yesterday at which time she presented with suicidal ideation and thoughts of cutting herself.  She was evaluated by DEC  who felt her to be at her mental status baseline.  She was able to contract for safety and group home was comfortable with her returning.  She has excellent outpatient support system and group home staff notes that she often seems to visit her friend in [place] then called 911 for a ride here to [facility].  Staff at [facility] are able to be available 24/7 and patient is well-connected with the mental health team.  Patient does have a care plan for ED visits and I reviewed that as well.  Per care plan, \"patient has an active outpatient team that she can utilize for support and lives in a group home with 24/7 staffing.  Typically patient called 911 not group home staff.  Additionally, L P's have attempted to recommend services such as day treatment but patient refuses recommendations.  It appears patient comes to the ED requesting admission, she does not like her group home.  \"ED providers are advised to triage and evaluate for new acute symptoms and to contact the group home staff.    Time of arrival here, patient was agitated, irritable, and uncooperative with staff, triage process, and collection of belongings.  She " "refused vitals and was not amenable to interacting with room RN.  At time of my initial evaluation, patient remained argumentative and uncooperative.  She stated that she called 911 to come here because she \"just wanted to get killed.\"  She states that she has had ongoing suicidal ideation with a plan to bite or punch herself.  She states that she stopped taking her mental health medications because she did not feel them to be helpful.  She is not able to clearly articulate if anything is changed since yesterday when she was here, but does report that she \"always\" has thoughts of hurting herself.  She denies drug or alcohol use and has no physical complaints.  She also denies homicidal ideation, visual hallucinations, or auditory hallucinations.    I spoke with  just after my initial encounter.   reports that patient typically goes to visit her friend in [place] and then called 911 from friend's house and asked to be transported here to [facility].  Staff notes that she seems to be \"fixated\" on 1 hospital and will call for transport multiple times until she encounters a consequence for doing so.  Group home staff reports that they have employees and support available 24/7 and feel very comfortable taking patient back today.  They note that this seems to be her baseline and typical behavioral pattern, possibly seeking some sort of secondary gain.  They also note that she has multiple outpatient mental health supports that she can utilize.   recommended transporting back by cab.    I updated patient with recommendations for discharge to group [place].  I encouraged her to take all of her medications as prescribed and use her outpatient support system.  She was discharged in stable condition via cab and sent with paperwork as well as recommendations for follow-up and indications to return to the emergency department.        Medical Decision Making  Obtained " supplemental history:Supplemental history obtained?: Documented in chart  Reviewed external records: External records reviewed?: Documented in chart  Care impacted by chronic illness:Mental Health  Care significantly affected by social determinants of health:Access to Medical Care  Did you consider but not order tests?: Work up considered but not performed and documented in chart, if applicable  Did you interpret images independently?: Independent interpretation of ECG and images noted in documentation, when applicable.  Consultation discussion with other provider:Did you involve another provider (consultant, , pharmacy, etc.)?: No  Discharge. I recommended the patient continue their current prescription strength medication(s): mental health medications. I considered admission, but ultimately discharged patient after speaking with .      ED COURSE  11:39 PM I met with the patient, obtained history, performed an initial exam, and discussed options and plan for diagnostics and treatment here in the ED. The patient declines speaking with the DEC  and would like to be discharged back to her group home.  11:44 PM I spoke with the patient's  and they are comfortable taking the patient back.    MEDICATIONS GIVEN IN THE ED  Medications - No data to display    NEW PRESCRIPTIONS STARTED AT TODAY'S VISIT  Discharge Medication List as of 2/16/2024 12:04 AM             =================================================================    Chief Complaint   Patient presents with    Suicidal         HPI:    Patient information was obtained from: The patient    Use of : N/A     Sadaf Ross is a 23 year old female who presents with suicidal ideation.    Limited HPI due to noncompliance    Per chart review, the patient has been on four separate visits within this month for suicidal ideation. She was last seen on 02/14/2024. She was cutting herself over the past several  "hours. Patient has been compliant with her meds. She was evaluated by DEC and no longer reported any acute Suicidal ideation. She was discharged back to her group home.    The patient states she came to the ER because \"I just want to get killed\". She plans on committing her act through biting and punching herself. She has attempted to kill herself in the past by \"slicing her neck\". She denies any alcohol or drug use tonight. She has not been exposed to any recent viral illness.    Per , They reported that patient usually goes to her friend's house in [place] and will call EMS from there. She tends to be fixated to a hospital and will visit frequently. Her  states that they have enough staff to monitor her 24/7 and is comfortable taking her back.     Otherwise, the patient denied having hallucinations, homicidal ideation, and any other medical complaints or concerns at this time.         RELEVANT HISTORY, MEDICATIONS, & ALLERGIES   Past medical history, surgical history, family history, medications, and allergies reviewed and pertinent noted in HPI.    REVIEW OF SYSTEMS:  A complete review of systems was performed with pertinent positives and negatives noted in the HPI. All other systems negative.     PHYSICAL EXAM:    Vitals: Ht 1.6 m (5' 3\")   Wt 114.8 kg (253 lb)   LMP  (LMP Unknown)   BMI 44.82 kg/m    General: Awake, alert, interactive.   HENT: Atraumatic. MMM.   Neck: Full AROM.  Cardiovascular: Regular rate.  Respiratory/Chest: Normal work of breathing.   Abdomen: Non-distended.   Musculoskeletal: Normal appearing extremities without obvious deformities or signs of trauma.   Skin: Normal color. No rash or diaphoresis.  Neurologic: Alert, oriented. Speech clear. CN's grossly intact. Moving all extremities spontaneously.   Psychiatric: Patient reports mood as \"angry.\" Affect appears agitated and irritable, congruent with mood. Eye contact poo. Does not appear to be " responding to internal stimuli. Thought process and content organized, no delusions. Speech normal, not tangential or pressured. Endorses suicidal ideation with plan. Denies homicidal ideation, visual hallucinations, or auditory hallucinations.          I, Lon Gaytan, am serving as a scribe to document services personally performed by Dr. Kristina Le based on my observation and the provider's statements to me. I, Kristina Le MD attest that Lon Gaytan is acting in a scribe capacity, has observed my performance of the services and has documented them in accordance with my direction.    Kristina Le M.D.  Emergency Medicine  [facility] EMERGENCY DEPARTMENT  [facility] [id number] [phone]  Dept: [phone]     Kristina Le MD  02/16/24 0705

## 2024-02-17 ENCOUNTER — HOSPITAL ENCOUNTER (EMERGENCY)
Facility: HOSPITAL | Age: 24
Discharge: HOME | End: 2024-02-17
Attending: EMERGENCY MEDICINE | Admitting: EMERGENCY MEDICINE
Payer: MEDICARE

## 2024-02-17 VITALS
RESPIRATION RATE: 20 BRPM | BODY MASS INDEX: 44.83 KG/M2 | TEMPERATURE: 98.9 F | HEIGHT: 63 IN | OXYGEN SATURATION: 98 % | WEIGHT: 253 LBS | SYSTOLIC BLOOD PRESSURE: 149 MMHG | HEART RATE: 100 BPM | DIASTOLIC BLOOD PRESSURE: 90 MMHG

## 2024-02-17 DIAGNOSIS — F41.0 ANXIETY ATTACK: ICD-10-CM

## 2024-02-17 PROCEDURE — 99283 EMERGENCY DEPT VISIT LOW MDM: CPT

## 2024-02-17 PROCEDURE — 250N000013 HC RX MED GY IP 250 OP 250 PS 637: Performed by: EMERGENCY MEDICINE

## 2024-02-17 RX ORDER — HYDROXYZINE HYDROCHLORIDE 25 MG/1
50 TABLET, FILM COATED ORAL ONCE
Status: COMPLETED | OUTPATIENT
Start: 2024-02-17 | End: 2024-02-17

## 2024-02-17 RX ADMIN — HYDROXYZINE HYDROCHLORIDE 50 MG: 25 TABLET, FILM COATED ORAL at 21:50

## 2024-02-17 ASSESSMENT — ACTIVITIES OF DAILY LIVING (ADL): ADLS_ACUITY_SCORE: 39

## 2024-02-18 ENCOUNTER — HOSPITAL ENCOUNTER (EMERGENCY)
Facility: HOSPITAL | Age: 24
Discharge: HOME | End: 2024-02-18
Attending: STUDENT IN AN ORGANIZED HEALTH CARE EDUCATION/TRAINING PROGRAM | Admitting: STUDENT IN AN ORGANIZED HEALTH CARE EDUCATION/TRAINING PROGRAM
Payer: MEDICARE

## 2024-02-18 VITALS
TEMPERATURE: 99.4 F | SYSTOLIC BLOOD PRESSURE: 154 MMHG | RESPIRATION RATE: 18 BRPM | HEART RATE: 102 BPM | WEIGHT: 253 LBS | BODY MASS INDEX: 44.82 KG/M2 | DIASTOLIC BLOOD PRESSURE: 74 MMHG | OXYGEN SATURATION: 98 %

## 2024-02-18 DIAGNOSIS — R19.7 DIARRHEA, UNSPECIFIED TYPE: ICD-10-CM

## 2024-02-18 PROCEDURE — 99283 EMERGENCY DEPT VISIT LOW MDM: CPT

## 2024-02-18 NOTE — ED NOTES
Tearful when provider in room. After provider left, cheerful. Coming out of room wanting to chat with nurse. Requesting bags for belongings. Has Gatoraide and snacks in room. Currently on room with Gatoraid and on phone. No signs of distress. Nurse trying to keep in eye sight as care plan indicated patient will wander from room and has potential for violence.

## 2024-02-18 NOTE — ED PROVIDER NOTES
EMERGENCY DEPARTMENT ENCOUNTER      NAME: Sadaf Ross  AGE: 23 year old female  YOB: 2000  MRN: [id number]  EVALUATION DATE & TIME: No admission date for patient encounter.    PCP: [facility]    ED PROVIDER: Otilio Cornelius D.O.      Chief Complaint   Patient presents with    Anxiety       FINAL IMPRESSION:  1. Anxiety attack        ED COURSE & MEDICAL DECISION MAKIN:40 PM I met with the patient to gather history and to perform my initial exam. I discussed the plan for care while in the Emergency Department. She declined the behavioral therapy treatment in the ED.  10:47 PM I rechecked the patient. Patient feels great and improved. We discussed plan for discharge. Patient is in agreement with the plan.         Pertinent Labs & Imaging studies reviewed. (See chart for details)  23 year old female presents to the Emergency Department for evaluation of anxiety.  Patient denied suicidal or homicidal ideation, and also denied AVH.  I do not believe her to be a threat to herself or others and easily able to contract for safety.  She has significant life stressors with this apparently being the anniversary of her father's death.  Her physical exam was benign, and there is no evidence of infection.  Patient was given a single dose of 50 mg of hydroxyzine here, and this appears to have improved her anxiety and she feels comfortable with discharge at this time.  She will follow-up as an outpatient with primary care provider.  She will return to her group home.  Return precautions discussed.    Medical Decision Making  Obtained supplemental history:Supplemental history obtained?: Documented in chart  Reviewed external records: External records reviewed?: Documented in chart and Outpatient Record: Patient visited [facility] Emergency Department from 2/15-. See HPI for more information.  Care impacted by chronic illness:Mental Health  Care significantly affected by social  "determinants of health:N/A  Did you consider but not order tests?: Work up considered but not performed and documented in chart, if applicable  Did you interpret images independently?: Independent interpretation of ECG and images noted in documentation, when applicable.  Consultation discussion with other provider:Did you involve another provider (consultant, , pharmacy, etc.)?: No  Discharge. No recommendations on prescription strength medication(s). See documentation for any additional details.    At the conclusion of the encounter I discussed the results of all of the tests and the disposition. The questions were answered. The patient or family acknowledged understanding and was agreeable with the care plan.      HPI    Patient information was obtained from: Patient    Use of : N/A     Sadaf Ross is a 23 year old female who presents to the ED by ambulance with a concern of \"panic\" attack which occurred this morning. She has experienced these symptoms before. She endorses tremors in her hands, nausea, headaches, syncope, and a low appetite. Patient is grieving from her father's death anniversary, three years ago. She currently is seeking outpatient therapy. She is on zyprexa. Patient denies suicidal, homicidal thoughts, or hallucinations. No other medical concerns are expressed at this time.     Per Chart Review: Patient visited [facility] Emergency Department from 2/15-2/16, with a concern of suicidal ideation. Group home staff noticed more aggression. Refused vitals and not cooperating with ED staff. Sent back to group home after speaking with them, she hasn't taken her mental health medications on a daily but will continue to monitor her.     REVIEW OF SYSTEMS  Constitutional:  Denies fever, chills, weight loss or weakness. Positive for low appetite.  Eyes:  No pain, discharge, redness  HENT:  Denies sore throat, ear pain, congestion  Respiratory: No SOB, wheeze or " cough  Cardiovascular:  No CP, palpitations  GI:  Denies abdominal pain, vomiting, diarrhea. Positive for nausea.  : Denies dysuria, hematuria  Musculoskeletal:  Denies any new muscle/joint pain, swelling or loss of function.  Skin:  Denies rash, pallor  Neurologic:  Denies focal weakness or sensory changes. Positive for tremors (in hands), headaches, syncope.  Lymph: Denies swollen nodes  Psych: No suicidal, homicidal thoughts, hallucinations.    All other systems negative unless noted in HPI.    PAST MEDICAL HISTORY:  Past Medical History:   Diagnosis Date    ADHD (attention deficit hyperactivity disorder)     Bipolar 1 disorder (H)     Borderline personality disorder (H)     Depression     Depressive disorder     Intellectual disability     Obesity     Syncope        PAST SURGICAL HISTORY:  Past Surgical History:   Procedure Laterality Date    APPENDECTOMY      APPENDECTOMY           CURRENT MEDICATIONS:    No current facility-administered medications for this encounter.     Current Outpatient Medications   Medication    acetaminophen (TYLENOL) 325 MG tablet    albuterol (PROAIR HFA/PROVENTIL HFA/VENTOLIN HFA) 108 (90 Base) MCG/ACT inhaler    ARIPiprazole lauroxil ER (ARISTADA) 882 MG/3.2ML intra-muscular    bisacodyl (DULCOLAX) 5 MG EC tablet    calcium carbonate (TUMS) 500 MG chewable tablet    cyclobenzaprine (FLEXERIL) 10 MG tablet    dicyclomine (BENTYL) 20 MG tablet    docusate sodium (COLACE) 50 MG capsule    famotidine (PEPCID) 20 MG tablet    FLUoxetine (PROZAC) 40 MG capsule    hydrocortisone, Perianal, (HYDROCORTISONE) 2.5 % cream    hydrOXYzine (ATARAX) 50 MG tablet    ibuprofen (ADVIL/MOTRIN) 200 MG tablet    LORazepam (ATIVAN) 0.5 MG tablet    mupirocin (BACTROBAN) 2 % external ointment    OLANZapine zydis (ZYPREXA) 5 MG ODT    ondansetron (ZOFRAN) 4 MG tablet    ondansetron (ZOFRAN) 4 MG tablet    pantoprazole (PROTONIX) 40 MG EC tablet    polyethylene glycol (MIRALAX) 17 GM/Dose powder     "promethazine (PHENERGAN) 12.5 MG tablet    sennosides (SENOKOT) 8.6 MG tablet    traZODone (DESYREL) 50 MG tablet    Vitamin D, Cholecalciferol, 25 MCG (1000 UT) TABS         ALLERGIES:  Allergies   Allergen Reactions    Penicillins Rash and Unknown       FAMILY HISTORY:  Family History   Problem Relation Age of Onset    Diabetes Type 1 Father     Cancer Paternal Grandfather        SOCIAL HISTORY:  Social History     Socioeconomic History    Marital status: Single   Tobacco Use    Smoking status: Some Days     Packs/day: 0.25     Years: 5.00     Additional pack years: 0.00     Total pack years: 1.25     Types: Cigarettes    Smokeless tobacco: Never   Substance and Sexual Activity    Alcohol use: No    Drug use: No    Sexual activity: Not Currently     Partners: Female, Male     Birth control/protection: Pill, Injection       VITALS:  Patient Vitals for the past 24 hrs:   BP Temp Temp src Pulse Resp SpO2 Height Weight   02/17/24 2130 (!) 149/90 98.9  F (37.2  C) Temporal 100 20 98 % -- --   02/17/24 2129 -- -- -- -- -- -- 1.6 m (5' 3\") 114.8 kg (253 lb)       PHYSICAL EXAM    VITAL SIGNS: BP (!) 149/90   Pulse 100   Temp 98.9  F (37.2  C) (Temporal)   Resp 20   Ht 1.6 m (5' 3\")   Wt 114.8 kg (253 lb)   LMP  (LMP Unknown)   SpO2 98%   BMI 44.82 kg/m    Vitals: BP (!) 149/90   Pulse 100   Temp 98.9  F (37.2  C) (Temporal)   Resp 20   Ht 1.6 m (5' 3\")   Wt 114.8 kg (253 lb)   LMP  (LMP Unknown)   SpO2 98%   BMI 44.82 kg/m    General: Appears tearful, awake, alert, interactive.  Eyes: Conjunctivae non-injected. Sclera anicteric.  HENT: Atraumatic, normocephalic  Neck: Supple, normal ROM  Respiratory/Chest: Respiration unlabored, no tachypnea  Abdomen: non distended, soft, non tender  Musculoskeletal: Normal extremities. No edema or erythema.  Skin: Normal color. No rash or diaphoresis.  Neurologic: Alert and oriented, face symmetric, moves all extremities spontaneously. Speech clear.  Psychiatric:  Affect " normal, Judgment normal, Mood normal. No SI or HI.    LABS  No results found for this or any previous visit (from the past 24 hour(s)).      RADIOLOGY  No orders to display          MEDICATIONS GIVEN IN THE EMERGENCY:  Medications   hydrOXYzine HCl (ATARAX) tablet 50 mg (50 mg Oral $Given 2/17/24 8427)       NEW PRESCRIPTIONS STARTED AT TODAY'S ER VISIT  New Prescriptions    No medications on file        I, Aster Barnes, am serving as a scribe to document services personally performed by Otilio Cornelius D.O., based on my observations and the provider's statements to me.  I, Otilio Cornelius D.O., attest that Aster Barnes is acting in a scribe capacity, has observed my performance of the services and has documented them in accordance with my direction.     Otilio Cornelius D.O.  Emergency Medicine  [facility] EMERGENCY DEPARTMENT  [address] [place] [id number]  [phone]  Dept: [phone]       Otilio Cornelius,   02/17/24 9278

## 2024-02-18 NOTE — ED TRIAGE NOTES
Patient was brought by [facility] from [facility]. Patient reports that she had some episode of anxiety this evening and wanted to get checked out. Patients meds are at the group home.- reports that they don't work anyway.   Third anniversary of fathers death.

## 2024-02-19 ENCOUNTER — HOSPITAL ENCOUNTER (EMERGENCY)
Facility: HOSPITAL | Age: 24
Discharge: HOME | End: 2024-02-19
Attending: EMERGENCY MEDICINE | Admitting: EMERGENCY MEDICINE
Payer: MEDICARE

## 2024-02-19 VITALS
BODY MASS INDEX: 44.82 KG/M2 | OXYGEN SATURATION: 100 % | TEMPERATURE: 99.7 F | DIASTOLIC BLOOD PRESSURE: 74 MMHG | RESPIRATION RATE: 18 BRPM | SYSTOLIC BLOOD PRESSURE: 149 MMHG | HEART RATE: 85 BPM | WEIGHT: 253 LBS

## 2024-02-19 DIAGNOSIS — R19.7 DIARRHEA, UNSPECIFIED TYPE: ICD-10-CM

## 2024-02-19 LAB
ATRIAL RATE - MUSE: 69 BPM
DIASTOLIC BLOOD PRESSURE - MUSE: NORMAL MMHG
INTERPRETATION ECG - MUSE: NORMAL
P AXIS - MUSE: 40 DEGREES
PR INTERVAL - MUSE: 184 MS
QRS DURATION - MUSE: 88 MS
QT - MUSE: 404 MS
QTC - MUSE: 432 MS
R AXIS - MUSE: 70 DEGREES
SYSTOLIC BLOOD PRESSURE - MUSE: NORMAL MMHG
T AXIS - MUSE: -7 DEGREES
VENTRICULAR RATE- MUSE: 69 BPM

## 2024-02-19 PROCEDURE — 99283 EMERGENCY DEPT VISIT LOW MDM: CPT

## 2024-02-19 RX ORDER — LOPERAMIDE HYDROCHLORIDE 2 MG/1
TABLET ORAL
Qty: 30 TABLET | Refills: 0 | Status: SHIPPED | OUTPATIENT
Start: 2024-02-19

## 2024-02-19 RX ORDER — LOPERAMIDE HYDROCHLORIDE 2 MG/1
TABLET ORAL
Qty: 30 TABLET | Refills: 0 | Status: SHIPPED | OUTPATIENT
Start: 2024-02-19 | End: 2024-02-19

## 2024-02-19 ASSESSMENT — ACTIVITIES OF DAILY LIVING (ADL): ADLS_ACUITY_SCORE: 37

## 2024-02-19 NOTE — ED TRIAGE NOTES
Pt BIBA with diarrhea that she has had for the last week. No other symptoms such as n/v or abdominal pain.Pt has a suicide attempt hx. Today denies being suicidal or having any thoughts of suicide.      Triage Assessment (Adult)       Row Name 02/18/24 1916          Triage Assessment    Airway WDL WDL        Respiratory WDL    Respiratory WDL WDL        Skin Circulation/Temperature WDL    Skin Circulation/Temperature WDL WDL        Cardiac WDL    Cardiac WDL WDL

## 2024-02-19 NOTE — ED PROVIDER NOTES
Emergency Department Encounter         FINAL IMPRESSION:  Diarrhea          ED COURSE AND MEDICAL DECISION MAKING            Patient is a morbidly obese 23-year-old history of mental health disorder, here with diarrhea for the last week or so.  Patient has been seen a significant amount of times in the ER of the last month or so.  States that she has been having increasing diarrhea over the last few days.  More than 10 episodes a day.  No black or blood.  Patient states her roommate had C. difficile and she is concerned she has C. difficile.  No fevers chills nausea vomiting.  No abdominal pain.  No dysuria or vaginal bleeding or discharge.    Arrival she looks well.  Vitals are stable.  Heart and lungs normal.  Abdomen benign.  Moist mucous membranes.  No indication for labs or imaging considering patient without any pain and looks well-hydrated.  Recommended C. difficile test and patient declined/refused.  Explained my reasoning and patient still refused.  Recommending Metamucil over-the-counter to help with her diarrhea/constipation.  Discharged home in stable condition.                   Medical Decision Making  Obtained supplemental history:Supplemental history obtained?: No  Reviewed external records: External records reviewed?: No  Care impacted by chronic illness:N/A  Care significantly affected by social determinants of health:N/A  Did you consider but not order tests?: Work up considered but not performed and documented in chart, if applicable  Did you interpret images independently?: Independent interpretation of ECG and images noted in documentation, when applicable.  Consultation discussion with other provider:Did you involve another provider (consultant, , pharmacy, etc.)?: No  Discharge. No recommendations on prescription strength medication(s). See documentation for any additional details.              Critical Care     Performed by: Kevin Solis or    Authorized by: Kevin Solis  Total critical care  time:  minutes  Critical care was necessary to treat or prevent imminent or life-threatening deterioration of the following conditions:   Critical care was time spent personally by me on the following activities: development of treatment plan with patient or surrogate, discussions with consultants, examination of patient, evaluation of patient's response to treatment, obtaining history from patient or surrogate, ordering and performing treatments and interventions, ordering and review of laboratory studies, ordering and review of radiographic studies, re-evaluation of patient's condition and monitoring for potential decompensation.  Critical care time was exclusive of separately billable procedures and treating other patients.'    At the conclusion of the encounter I discussed the results of all the tests and the disposition. The questions were answered. The patient or family acknowledged understanding and was agreeable with the care plan.        MEDICATIONS GIVEN IN THE EMERGENCY DEPARTMENT:  Medications - No data to display    NEW PRESCRIPTIONS STARTED AT TODAY'S ED VISIT:  New Prescriptions    No medications on file       HPI     Morbidly obese 23 history mental health disorder, here diarrhea for the last week.  No black or blood in stool.  No abdominal pain.  No fevers chills nausea vomiting.  No headache.  No chest pain or trouble breathing          MEDICAL HISTORY     Past Medical History:   Diagnosis Date    ADHD (attention deficit hyperactivity disorder)     Bipolar 1 disorder (H)     Borderline personality disorder (H)     Depression     Depressive disorder     Intellectual disability     Obesity     Syncope        Past Surgical History:   Procedure Laterality Date    APPENDECTOMY      APPENDECTOMY         Social History     Tobacco Use    Smoking status: Some Days     Packs/day: 0.25     Years: 5.00     Additional pack years: 0.00     Total pack years: 1.25     Types: Cigarettes    Smokeless tobacco: Never    Substance Use Topics    Alcohol use: No    Drug use: No       acetaminophen (TYLENOL) 325 MG tablet  albuterol (PROAIR HFA/PROVENTIL HFA/VENTOLIN HFA) 108 (90 Base) MCG/ACT inhaler  ARIPiprazole lauroxil ER (ARISTADA) 882 MG/3.2ML intra-muscular  bisacodyl (DULCOLAX) 5 MG EC tablet  calcium carbonate (TUMS) 500 MG chewable tablet  cyclobenzaprine (FLEXERIL) 10 MG tablet  dicyclomine (BENTYL) 20 MG tablet  docusate sodium (COLACE) 50 MG capsule  famotidine (PEPCID) 20 MG tablet  FLUoxetine (PROZAC) 40 MG capsule  hydrocortisone, Perianal, (HYDROCORTISONE) 2.5 % cream  hydrOXYzine (ATARAX) 50 MG tablet  ibuprofen (ADVIL/MOTRIN) 200 MG tablet  LORazepam (ATIVAN) 0.5 MG tablet  mupirocin (BACTROBAN) 2 % external ointment  OLANZapine zydis (ZYPREXA) 5 MG ODT  ondansetron (ZOFRAN) 4 MG tablet  ondansetron (ZOFRAN) 4 MG tablet  pantoprazole (PROTONIX) 40 MG EC tablet  polyethylene glycol (MIRALAX) 17 GM/Dose powder  promethazine (PHENERGAN) 12.5 MG tablet  sennosides (SENOKOT) 8.6 MG tablet  traZODone (DESYREL) 50 MG tablet  Vitamin D, Cholecalciferol, 25 MCG (1000 UT) TABS            PHYSICAL EXAM     BP (!) 154/74   Pulse 102   Temp 99.4  F (37.4  C) (Temporal)   Resp 18   Wt 114.8 kg (253 lb)   LMP  (LMP Unknown)   SpO2 98%   BMI 44.82 kg/m        PHYSICAL EXAM:     General: Patient appears well, nontoxic, comfortable  HEENT: Moist mucous membranes,  No head trauma.    Cardiovascular: Normal rate, normal rhythm, no extremity edema.  No appreciable murmur.  Respiratory: No signs of respiratory distress, lungs are clear to auscultation bilaterally with no wheezes rhonchi or rales.  Abdominal: Soft, nontender, nondistended, no palpable masses, no guarding, no rebound  Musculoskeletal: Full range of motion of joints, no deformities appreciated.  Neurological: Alert and oriented, grossly neurologically intact.  Psychological: Normal affect and mood.  Integument: No rashes appreciated          RESULTS       Labs  Ordered and Resulted from Time of ED Arrival to Time of ED Departure - No data to display    No orders to display         PROCEDURES:  Procedures:  Procedures         Kevin Solis DO  Emergency Medicine  [facility] EMERGENCY DEPARTMENT       Ohsergio, Kevin Santiago DO  02/18/24 6927

## 2024-02-19 NOTE — DISCHARGE INSTRUCTIONS
Please start taking Metamucil which is in an orange bottle that you can purchase at a pharmacy.  This should help with your diarrhea and constipation.  Return for any worsening symptoms

## 2024-02-20 NOTE — ED TRIAGE NOTES
Patient arrives with complaints of diarrhea x 3.5 weeks.  No OTC meds.  Denies any abdominal pain.  Recent visit for same complaint.

## 2024-02-20 NOTE — DISCHARGE INSTRUCTIONS
You were seen in the emergency department for ongoing diarrhea.  As we discussed, we think that it would be reasonable to have you start taking Imodium to help with regulating stools.  You can take 2 tablets (4 mg) in the morning and 1 tablet (2 mg) with every loose stool throughout the day up to a maximum of 8 tablets daily (16 mg).  Please try to keep a lot of fiber in your diet and drink plenty of water.  We would recommend updating your clinic after this emergency department visit.

## 2024-02-20 NOTE — ED PROVIDER NOTES
EMERGENCY DEPARTMENT ENCOUNTER      NAME: Sadaf Ross  AGE: 23 year old female  YOB: 2000  MRN: [id number]  EVALUATION DATE & TIME: 2024  8:52 PM    PCP: [facility]    ED PROVIDER: Dixon Roe M.D.      Chief Complaint   Patient presents with    Diarrhea       FINAL IMPRESSION:  1. Diarrhea, unspecified type        ED COURSE & MEDICAL DECISION MAKIN year old female presents to the Emergency Department for evaluation of diarrhea.  Patient with frequent ED visits for mental health and gastrointestinal concerns.  Seen yesterday for similar complaint.  Symptoms are subacute to chronic at this point.  Exam is benign.  Reviewed recent labs and I do not think any further lab or imaging evaluation would be beneficial at this time.  Reiterated plan to have the patient's start Imodium.  I do not have any strong suspicion for any acute intra-abdominal process like diverticulitis, C. difficile colitis, etc.  Patient was understanding of this.  She was discharged in stable condition.    At the conclusion of the encounter I discussed the results of all of the tests and the disposition. The questions were answered. The patient or family acknowledged understanding and was agreeable with the care plan.     9:07 PM I met the patient and performed my initial interview and exam. We discussed the plan for discharge and the patient is agreeable. Reviewed supportive cares, symptomatic treatment, outpatient follow up, and reasons to return to the Emergency Department. All questions and concerns were addressed. Patient to be discharged by ED RN.      Medical Decision Making  Obtained supplemental history:Supplemental history obtained?: No  Reviewed external records: External records reviewed?: Documented in chart  Care impacted by chronic illness:Mental Health  Care significantly affected by social determinants of health:N/A  Did you consider but not order tests?: Work up considered but not  performed and documented in chart, if applicable  Did you interpret images independently?: Independent interpretation of ECG and images noted in documentation, when applicable.  Consultation discussion with other provider:Did you involve another provider (consultant, , pharmacy, etc.)?: No  Discharge. No recommendations on prescription strength medication(s). See documentation for any additional details.        MEDICATIONS GIVEN IN THE EMERGENCY:  Medications - No data to display    NEW PRESCRIPTIONS STARTED AT TODAY'S ER VISIT  Discharge Medication List as of 2/19/2024  9:28 PM        START taking these medications    Details   loperamide (IMODIUM A-D) 2 MG tablet Take 2 tablets (4 mg) in the morning.  Take 1 tablet (2 mg) with every loose stool.  Do not exceed 8 tablets in a day., Disp-30 tablet, R-0, Local Print                =================================================================    HPI    Patient information was obtained from: The patient     Use of : N/A       Sadaf Ross is a 23 year old female with a pertinent history of intellectual disability, bipolar disorder, and depression who presents to this ED by ambulance for evaluation of diarrhea.     Per Chart Review: Patient visited [facility] Emergency Department yesterday (2/18/24) for evaluation of increased diarrhea. She was discharged home with a recommendation to take metamucil.     The patient has had diarrhea for the last 3.5 weeks. She reports that she is having about 9 loose stools a day. She reports that her stool is liquid and light brown. She was seen yesterday and it was recommended she take metamucil for her diarrhea. She reports that she does not have the means to purchase the metamucil and is wondering if there is anything else she can take. She denies any abdominal pain or fever.     REVIEW OF SYSTEMS   All systems reviewed and negative except as noted in HPI.    PAST MEDICAL  HISTORY:  Past Medical History:   Diagnosis Date    ADHD (attention deficit hyperactivity disorder)     Bipolar 1 disorder (H)     Borderline personality disorder (H)     Depression     Depressive disorder     Intellectual disability     Obesity     Syncope        PAST SURGICAL HISTORY:  Past Surgical History:   Procedure Laterality Date    APPENDECTOMY      APPENDECTOMY             CURRENT MEDICATIONS:    No current facility-administered medications for this encounter.     Current Outpatient Medications   Medication    loperamide (IMODIUM A-D) 2 MG tablet    acetaminophen (TYLENOL) 325 MG tablet    albuterol (PROAIR HFA/PROVENTIL HFA/VENTOLIN HFA) 108 (90 Base) MCG/ACT inhaler    ARIPiprazole lauroxil ER (ARISTADA) 882 MG/3.2ML intra-muscular    bisacodyl (DULCOLAX) 5 MG EC tablet    calcium carbonate (TUMS) 500 MG chewable tablet    cyclobenzaprine (FLEXERIL) 10 MG tablet    dicyclomine (BENTYL) 20 MG tablet    docusate sodium (COLACE) 50 MG capsule    famotidine (PEPCID) 20 MG tablet    FLUoxetine (PROZAC) 40 MG capsule    hydrocortisone, Perianal, (HYDROCORTISONE) 2.5 % cream    hydrOXYzine (ATARAX) 50 MG tablet    ibuprofen (ADVIL/MOTRIN) 200 MG tablet    LORazepam (ATIVAN) 0.5 MG tablet    mupirocin (BACTROBAN) 2 % external ointment    OLANZapine zydis (ZYPREXA) 5 MG ODT    ondansetron (ZOFRAN) 4 MG tablet    ondansetron (ZOFRAN) 4 MG tablet    pantoprazole (PROTONIX) 40 MG EC tablet    polyethylene glycol (MIRALAX) 17 GM/Dose powder    promethazine (PHENERGAN) 12.5 MG tablet    sennosides (SENOKOT) 8.6 MG tablet    traZODone (DESYREL) 50 MG tablet    Vitamin D, Cholecalciferol, 25 MCG (1000 UT) TABS         ALLERGIES:  Allergies   Allergen Reactions    Penicillins Rash and Unknown       FAMILY HISTORY:  Family History   Problem Relation Age of Onset    Diabetes Type 1 Father     Cancer Paternal Grandfather        SOCIAL HISTORY:   Social History     Socioeconomic History    Marital status: Single   Tobacco  Use    Smoking status: Some Days     Packs/day: 0.25     Years: 5.00     Additional pack years: 0.00     Total pack years: 1.25     Types: Cigarettes    Smokeless tobacco: Never   Substance and Sexual Activity    Alcohol use: No    Drug use: No    Sexual activity: Not Currently     Partners: Female, Male     Birth control/protection: Pill, Injection       VITALS:  BP (!) 149/74   Pulse 85   Temp 99.7  F (37.6  C) (Temporal)   Resp 18   Wt 114.8 kg (253 lb)   LMP  (LMP Unknown)   SpO2 100%   BMI 44.82 kg/m      PHYSICAL EXAM    Constitutional: Well developed, Well nourished, NAD.  HENT: Normocephalic, Atraumatic. Neck Supple.  Eyes: EOMI, Conjunctiva normal.  Respiratory: Breathing comfortably on room air. Speaks full sentences easily. Lungs clear to ascultation.  Cardiovascular: Normal heart rate, Regular rhythm. No peripheral edema.  Abdomen: Soft, nontender  Musculoskeletal: Good range of motion in all major joints. No major deformities noted.  Integument: Warm, Dry.  Neurologic: Alert & awake, Normal motor function, Normal sensory function, No focal deficits noted.   Psychiatric: Cooperative. Affect appropriate.           I, Zoraida Cochran, am serving as a scribe to document services personally performed by Dr. Dixon Roe, based on my observation and the provider's statements to me. I, Dixon Roe MD attest that Zoraida Cochran is acting in a scribe capacity, has observed my performance of the services and has documented them in accordance with my direction.    Dixon Roe M.D.  Emergency Medicine  [facility] EMERGENCY DEPARTMENT  [address] [place] [id number]  [phone]  Dept: [phone]       Dixon Roe MD  02/19/24 1509

## 2024-02-22 ENCOUNTER — HOSPITAL ENCOUNTER (EMERGENCY)
Facility: HOSPITAL | Age: 24
Discharge: HOME | End: 2024-02-22
Attending: EMERGENCY MEDICINE | Admitting: EMERGENCY MEDICINE
Payer: MEDICARE

## 2024-02-22 VITALS
HEART RATE: 110 BPM | TEMPERATURE: 98 F | WEIGHT: 253 LBS | HEIGHT: 63 IN | OXYGEN SATURATION: 100 % | RESPIRATION RATE: 24 BRPM | DIASTOLIC BLOOD PRESSURE: 94 MMHG | SYSTOLIC BLOOD PRESSURE: 167 MMHG | BODY MASS INDEX: 44.83 KG/M2

## 2024-02-22 LAB
ALBUMIN UR-MCNC: NEGATIVE MG/DL
APPEARANCE UR: CLEAR
BACTERIA #/AREA URNS HPF: ABNORMAL /HPF
BILIRUB UR QL STRIP: NEGATIVE
COLOR UR AUTO: COLORLESS
GLUCOSE UR STRIP-MCNC: NEGATIVE MG/DL
HCG UR QL: NEGATIVE
HGB UR QL STRIP: NEGATIVE
KETONES UR STRIP-MCNC: NEGATIVE MG/DL
LEUKOCYTE ESTERASE UR QL STRIP: NEGATIVE
MUCOUS THREADS #/AREA URNS LPF: PRESENT /LPF
NITRATE UR QL: NEGATIVE
PH UR STRIP: 5.5 [PH] (ref 5–7)
RBC URINE: 0 /HPF
SP GR UR STRIP: 1.01 (ref 1–1.03)
SQUAMOUS EPITHELIAL: 1 /HPF
UROBILINOGEN UR STRIP-MCNC: <2 MG/DL
WBC URINE: <1 /HPF

## 2024-02-22 PROCEDURE — 99281 EMR DPT VST MAYX REQ PHY/QHP: CPT

## 2024-02-22 PROCEDURE — 81025 URINE PREGNANCY TEST: CPT | Performed by: EMERGENCY MEDICINE

## 2024-02-22 PROCEDURE — 81001 URINALYSIS AUTO W/SCOPE: CPT | Performed by: EMERGENCY MEDICINE

## 2024-02-22 ASSESSMENT — COLUMBIA-SUICIDE SEVERITY RATING SCALE - C-SSRS
6. HAVE YOU EVER DONE ANYTHING, STARTED TO DO ANYTHING, OR PREPARED TO DO ANYTHING TO END YOUR LIFE?: NO
2. HAVE YOU ACTUALLY HAD ANY THOUGHTS OF KILLING YOURSELF IN THE PAST MONTH?: NO
1. IN THE PAST MONTH, HAVE YOU WISHED YOU WERE DEAD OR WISHED YOU COULD GO TO SLEEP AND NOT WAKE UP?: NO

## 2024-02-24 ENCOUNTER — HOSPITAL ENCOUNTER (EMERGENCY)
Facility: HOSPITAL | Age: 24
Discharge: NURSING, REHAB OR LONG-TERM CARE | End: 2024-02-24
Attending: EMERGENCY MEDICINE | Admitting: EMERGENCY MEDICINE
Payer: MEDICARE

## 2024-02-24 VITALS
SYSTOLIC BLOOD PRESSURE: 122 MMHG | RESPIRATION RATE: 16 BRPM | OXYGEN SATURATION: 100 % | HEART RATE: 77 BPM | DIASTOLIC BLOOD PRESSURE: 50 MMHG | TEMPERATURE: 97.8 F

## 2024-02-24 DIAGNOSIS — Z87.898 HX OF NAUSEA AND VOMITING: ICD-10-CM

## 2024-02-24 LAB
ALBUMIN SERPL BCG-MCNC: 4.1 G/DL (ref 3.5–5.2)
ALP SERPL-CCNC: 67 U/L (ref 40–150)
ALT SERPL W P-5'-P-CCNC: 39 U/L (ref 0–50)
ANION GAP SERPL CALCULATED.3IONS-SCNC: 9 MMOL/L (ref 7–15)
AST SERPL W P-5'-P-CCNC: 23 U/L (ref 0–45)
BASOPHILS # BLD AUTO: 0 10E3/UL (ref 0–0.2)
BASOPHILS NFR BLD AUTO: 0 %
BILIRUB SERPL-MCNC: 0.2 MG/DL
BUN SERPL-MCNC: 8.7 MG/DL (ref 6–20)
CALCIUM SERPL-MCNC: 8.6 MG/DL (ref 8.6–10)
CHLORIDE SERPL-SCNC: 107 MMOL/L (ref 98–107)
CREAT SERPL-MCNC: 0.89 MG/DL (ref 0.51–0.95)
DEPRECATED HCO3 PLAS-SCNC: 24 MMOL/L (ref 22–29)
EGFRCR SERPLBLD CKD-EPI 2021: >90 ML/MIN/1.73M2
EOSINOPHIL # BLD AUTO: 0.1 10E3/UL (ref 0–0.7)
EOSINOPHIL NFR BLD AUTO: 2 %
ERYTHROCYTE [DISTWIDTH] IN BLOOD BY AUTOMATED COUNT: 13.2 % (ref 10–15)
GLUCOSE SERPL-MCNC: 86 MG/DL (ref 70–99)
HCG SERPL QL: NEGATIVE
HCT VFR BLD AUTO: 36 % (ref 35–47)
HGB BLD-MCNC: 12.2 G/DL (ref 11.7–15.7)
IMM GRANULOCYTES # BLD: 0 10E3/UL
IMM GRANULOCYTES NFR BLD: 0 %
LIPASE SERPL-CCNC: 29 U/L (ref 13–60)
LYMPHOCYTES # BLD AUTO: 2.5 10E3/UL (ref 0.8–5.3)
LYMPHOCYTES NFR BLD AUTO: 37 %
MCH RBC QN AUTO: 27.7 PG (ref 26.5–33)
MCHC RBC AUTO-ENTMCNC: 33.9 G/DL (ref 31.5–36.5)
MCV RBC AUTO: 82 FL (ref 78–100)
MONOCYTES # BLD AUTO: 0.3 10E3/UL (ref 0–1.3)
MONOCYTES NFR BLD AUTO: 5 %
NEUTROPHILS # BLD AUTO: 3.7 10E3/UL (ref 1.6–8.3)
NEUTROPHILS NFR BLD AUTO: 56 %
NRBC # BLD AUTO: 0 10E3/UL
NRBC BLD AUTO-RTO: 0 /100
PLATELET # BLD AUTO: 226 10E3/UL (ref 150–450)
POTASSIUM SERPL-SCNC: 4 MMOL/L (ref 3.4–5.3)
PROT SERPL-MCNC: 6.7 G/DL (ref 6.4–8.3)
RBC # BLD AUTO: 4.4 10E6/UL (ref 3.8–5.2)
SODIUM SERPL-SCNC: 140 MMOL/L (ref 135–145)
WBC # BLD AUTO: 6.7 10E3/UL (ref 4–11)

## 2024-02-24 PROCEDURE — 99284 EMERGENCY DEPT VISIT MOD MDM: CPT | Mod: 25

## 2024-02-24 PROCEDURE — 36415 COLL VENOUS BLD VENIPUNCTURE: CPT | Performed by: EMERGENCY MEDICINE

## 2024-02-24 PROCEDURE — 258N000003 HC RX IP 258 OP 636: Performed by: EMERGENCY MEDICINE

## 2024-02-24 PROCEDURE — 250N000011 HC RX IP 250 OP 636: Performed by: EMERGENCY MEDICINE

## 2024-02-24 PROCEDURE — 80053 COMPREHEN METABOLIC PANEL: CPT | Performed by: EMERGENCY MEDICINE

## 2024-02-24 PROCEDURE — 83690 ASSAY OF LIPASE: CPT | Performed by: EMERGENCY MEDICINE

## 2024-02-24 PROCEDURE — 96361 HYDRATE IV INFUSION ADD-ON: CPT

## 2024-02-24 PROCEDURE — 96374 THER/PROPH/DIAG INJ IV PUSH: CPT

## 2024-02-24 PROCEDURE — 84703 CHORIONIC GONADOTROPIN ASSAY: CPT | Performed by: EMERGENCY MEDICINE

## 2024-02-24 PROCEDURE — 85025 COMPLETE CBC W/AUTO DIFF WBC: CPT | Performed by: EMERGENCY MEDICINE

## 2024-02-24 RX ORDER — ONDANSETRON 2 MG/ML
4 INJECTION INTRAMUSCULAR; INTRAVENOUS ONCE
Status: COMPLETED | OUTPATIENT
Start: 2024-02-24 | End: 2024-02-24

## 2024-02-24 RX ADMIN — ONDANSETRON 4 MG: 2 INJECTION INTRAMUSCULAR; INTRAVENOUS at 17:22

## 2024-02-24 RX ADMIN — SODIUM CHLORIDE, POTASSIUM CHLORIDE, SODIUM LACTATE AND CALCIUM CHLORIDE 1000 ML: 600; 310; 30; 20 INJECTION, SOLUTION INTRAVENOUS at 17:22

## 2024-02-24 ASSESSMENT — COLUMBIA-SUICIDE SEVERITY RATING SCALE - C-SSRS
1. IN THE PAST MONTH, HAVE YOU WISHED YOU WERE DEAD OR WISHED YOU COULD GO TO SLEEP AND NOT WAKE UP?: NO
6. HAVE YOU EVER DONE ANYTHING, STARTED TO DO ANYTHING, OR PREPARED TO DO ANYTHING TO END YOUR LIFE?: NO
2. HAVE YOU ACTUALLY HAD ANY THOUGHTS OF KILLING YOURSELF IN THE PAST MONTH?: NO

## 2024-02-24 ASSESSMENT — ACTIVITIES OF DAILY LIVING (ADL)
ADLS_ACUITY_SCORE: 39
ADLS_ACUITY_SCORE: 39

## 2024-02-24 NOTE — DISCHARGE INSTRUCTIONS
Continue medications as previously prescribed including Zofran.  Return to the emergency department if you have any recurrent vomiting despite the use of Zofran or develop abdominal pain.  See your primary care physician next week if any persistent symptoms.

## 2024-02-24 NOTE — ED NOTES
"Bed: JNED-25  Expected date: 2/24/24  Expected time: 4:28 PM  Means of arrival: Ambulance  Comments:  [place] - 23yof \"vomiting green\"  "

## 2024-02-24 NOTE — ED TRIAGE NOTES
Patient comes in via ems from [facility] for vomiting that started this morning and diarrhea. Patient states she had 9 episodes of each since this morning.

## 2024-02-24 NOTE — ED PROVIDER NOTES
EMERGENCY DEPARTMENT NOTE     Name: Sadaf Ross    Age/Sex: 23 year old female   MRN: [id number]   Evaluation Date & Time:  2/24/2024  4:54 PM    PCP:    [facility]   ED Provider: Ambrocio Serna D.O.       CHIEF COMPLAINT    Vomiting and Diarrhea       DIAGNOSIS & DISPOSITION/MEDICAL DECISION MAKING     1. Hx of nausea and vomiting        Sadaf Ross is a 23 year old female history of bipolar disorder, intellectual disability who presents to the emergency department for evaluation of nausea and vomiting.    Differential  diagnosis considered included but not limited to acute process including bowel obstruction perforation, cholecystitis, pancreatitis, appendicitis, obstructing urolithiasis or infectious process including  COVID, influenza    Medical Decision Making  Patient on exam was alert and interactive did not appear ill and was well-hydrated.  Cardiopulmonary exam normal.  Abdomen soft nontender no localized tenderness with bowel sounds present  Screening labs CBC, comprehensive metabolic profile, lipase normal limits.  Qualitative hCG negative.  Patient received IV fluids and Zofran.  Serial abdominal exams nontender.  Able to take fluids without recurrent vomiting.  Low suspicion for acute intra-abdominal process and further evaluation with ultrasound or CT deferred.  No pulmonary symptoms to suggest pneumonia and chest x-ray deferred.  Patient does have history of reflux and may have been contributory today and discussed dietary modifications.  Patient will be discharged back to group home and continue previously prescribed medications including Zofran as needed, pantoprazole and moderating.  Interventions: IV fluids, Zofran  Discharge Vital Signs:/50   Pulse 77   Temp 97.8  F (36.6  C) (Oral)   Resp 16   LMP  (LMP Unknown)   SpO2 100%      DISPOSITION: Home    Diagnostic studies:  Imaging:  No orders to display      Lab:  Labs Ordered and Resulted from Time of ED Arrival to Time  of ED Departure   COMPREHENSIVE METABOLIC PANEL - Normal       Result Value    Sodium 140      Potassium 4.0      Carbon Dioxide (CO2) 24      Anion Gap 9      Urea Nitrogen 8.7      Creatinine 0.89      GFR Estimate >90      Calcium 8.6      Chloride 107      Glucose 86      Alkaline Phosphatase 67      AST 23      ALT 39      Protein Total 6.7      Albumin 4.1      Bilirubin Total 0.2     HCG QUALITATIVE PREGNANCY - Normal    hCG Serum Qualitative Negative     LIPASE - Normal    Lipase 29     CBC WITH PLATELETS AND DIFFERENTIAL    WBC Count 6.7      RBC Count 4.40      Hemoglobin 12.2      Hematocrit 36.0      MCV 82      MCH 27.7      MCHC 33.9      RDW 13.2      Platelet Count 226      % Neutrophils 56      % Lymphocytes 37      % Monocytes 5      % Eosinophils 2      % Basophils 0      % Immature Granulocytes 0      NRBCs per 100 WBC 0      Absolute Neutrophils 3.7      Absolute Lymphocytes 2.5      Absolute Monocytes 0.3      Absolute Eosinophils 0.1      Absolute Basophils 0.0      Absolute Immature Granulocytes 0.0      Absolute NRBCs 0.0                 Triage note reviewed:Patient comes in via ems from half-way for vomiting that started this morning and diarrhea. Patient states she had 9 episodes of each since this morning.               History:  Supplemental history from: N/A  External Record(s) reviewed: ED care plan, patient behavioral care note February 7, 2024 and multiple ER visits and January and February 2024    Work Up:  Chart documentation includes differential considered and any EKGs or imaging independently interpreted by provider, where specified.  In additional to work up documented, I considered the following work up: CT of the abdomen but deferred secondary to low suspicion for acute intra-abdominal process, chest x-ray but deferred secondary low suspicion for cardiopulmonary process including pneumonia    External consultation:  Discussion of management with another provider:  NA    Complicating factors:  Care impacted by chronic illness: Mental Health  Care affected by social determinants of health: N/A    Disposition considerations: Discharge. No recommendations on prescription strength medication(s). N/A.    At the conclusion of the encounter I discussed the results of all of the tests and the disposition. The questions were answered. The patient or family acknowledged understanding and was agreeable with the care plan.    TOTAL CRITICAL CARE TIME (EXCLUDING PROCEDURES): Not applicable    PROCEDURES:   None    EMERGENCY DEPARTMENT COURSE   5:54 PM I met with the patient to gather history and to perform my initial exam.  We discussed treatment options and the plan for care while in the Emergency Department.    ED INTERVENTIONS     Medications   lactated ringers BOLUS 1,000 mL (1,000 mLs Intravenous $New Bag 2/24/24 1722)   ondansetron (ZOFRAN) injection 4 mg (4 mg Intravenous $Given 2/24/24 1722)       DISCHARGE MEDICATIONS        Review of your medicines        UNREVIEWED medicines. Ask your doctor about these medicines        Dose / Directions   acetaminophen 325 MG tablet  Commonly known as: TYLENOL      Dose: 650 mg  Take 650 mg by mouth every 6 hours as needed for mild pain  Refills: 0     albuterol 108 (90 Base) MCG/ACT inhaler  Commonly known as: PROAIR HFA/PROVENTIL HFA/VENTOLIN HFA      Dose: 2 puff  Inhale 2 puffs into the lungs every 6 hours as needed for shortness of breath, wheezing or cough  Quantity: 18 g  Refills: 0     ARIPiprazole lauroxil  MG/3.2ML intra-muscular  Commonly known as: ARISTADA      Dose: 882 mg  Inject 882 mg into the muscle every 28 days On the 22nd of each month  Refills: 0     bisacodyl 5 MG EC tablet  Commonly known as: DULCOLAX      Dose: 5 mg  Take 1 tablet (5 mg) by mouth daily as needed for constipation  Quantity: 30 tablet  Refills: 0     calcium carbonate 500 MG chewable tablet  Commonly known as: TUMS      Dose: 1 chew tab  Take 1 chew  tab by mouth 2 times daily as needed for heartburn  Refills: 0     cyclobenzaprine 10 MG tablet  Commonly known as: FLEXERIL      Dose: 10 mg  Take 10 mg by mouth 3 times daily as needed for muscle spasms  Refills: 0     dicyclomine 20 MG tablet  Commonly known as: BENTYL      Dose: 20 mg  Take 20 mg by mouth 2 times daily as needed (dyspepsia)  Refills: 0     docusate sodium 50 MG capsule  Commonly known as: COLACE      Dose: 100 mg  Take 100 mg by mouth 2 times daily  Refills: 0     famotidine 20 MG tablet  Commonly known as: PEPCID      Dose: 20 mg  Take 20 mg by mouth daily  Refills: 0     FLUoxetine 40 MG capsule  Commonly known as: PROzac      Dose: 80 mg  Take 80 mg by mouth daily  Refills: 0     hydrocortisone (Perianal) 2.5 % cream  Commonly known as: hydrocortisone      Place rectally 2 times daily as needed for hemorrhoids  Quantity: 30 g  Refills: 0     hydrOXYzine HCl 50 MG tablet  Commonly known as: ATARAX      Dose: 50 mg  Take 50 mg by mouth 2 times daily as needed for anxiety  Refills: 0     ibuprofen 200 MG tablet  Commonly known as: ADVIL/MOTRIN      Dose: 400 mg  Take 2 tablets (400 mg) by mouth every 6 hours as needed for pain  Quantity: 60 tablet  Refills: 0     loperamide 2 MG tablet  Commonly known as: IMODIUM A-D      Take 2 tablets (4 mg) in the morning.  Take 1 tablet (2 mg) with every loose stool.  Do not exceed 8 tablets in a day.  Quantity: 30 tablet  Refills: 0     LORazepam 0.5 MG tablet  Commonly known as: ATIVAN      Dose: 0.5 mg  Take 0.5 mg by mouth once as needed for anxiety Give as needed any time she has the urge to call 911 or to visit the ER  Refills: 0     mupirocin 2 % external ointment  Commonly known as: BACTROBAN      Apply topically 3 times daily  Quantity: 15 g  Refills: 0     OLANZapine zydis 5 MG ODT  Commonly known as: zyPREXA      Dose: 5 mg  Take 1 tablet (5 mg) by mouth At Bedtime  Quantity: 30 tablet  Refills: 0     * ondansetron 4 MG tablet  Commonly known as:  ZOFRAN  Indication: Nausea and Vomiting      Dose: 4 mg  Take 4 mg by mouth every 8 hours as needed for nausea  Refills: 0     * ondansetron 4 MG tablet  Commonly known as: Zofran      Dose: 4 mg  Take 1 tablet (4 mg) by mouth every 8 hours as needed  Quantity: 15 tablet  Refills: 0     pantoprazole 40 MG EC tablet  Commonly known as: PROTONIX      Dose: 40 mg  Take 40 mg by mouth daily  Refills: 0     polyethylene glycol 17 GM/Dose powder  Commonly known as: MIRALAX      Dose: 17 g  Take 17 g by mouth daily  Refills: 0     promethazine 12.5 MG tablet  Commonly known as: PHENERGAN      Dose: 12.5 mg  Take 12.5 mg by mouth every 4 hours  Refills: 0     sennosides 8.6 MG tablet  Commonly known as: SENOKOT      Dose: 1 tablet  Take 1 tablet by mouth 2 times daily as needed for constipation  Refills: 0     traZODone 50 MG tablet  Commonly known as: DESYREL      Dose: 100 mg  Take 100 mg by mouth At Bedtime  Refills: 0     Vitamin D3 25 mcg (1000 units) tablet  Commonly known as: CHOLECALCIFEROL      Dose: 1,000 Units  Take 1,000 Units by mouth daily  Refills: 0           * This list has 2 medication(s) that are the same as other medications prescribed for you. Read the directions carefully, and ask your doctor or other care provider to review them with you.                    INFORMATION SOURCE AND LIMITATIONS    History/Exam limitations: None  Patient information was obtained from: Patient   Use of : N/A    HISTORY OF PRESENT ILLNESS   Sadaf Ross is a 23 year old year old female with a relevant past history of bipolar 1 disorder, depression and ADHD, who presents to this ED by EMS for evaluation of vomiting.    The patient endorses vomiting since 8 AM this morning. She states that she had about 9 episodes of emesis. Patient also endorses diarrhea for which she takes pills and pepto bismol for to prevent. No other complaints at this time.     REVIEW OF SYSTEMS:   All other systems reviewed and are  negative except as noted above in HPI.    PATIENT HISTORY     Past Medical History:   Diagnosis Date    ADHD (attention deficit hyperactivity disorder)     Bipolar 1 disorder (H)     Borderline personality disorder (H)     Depression     Depressive disorder     Intellectual disability     Obesity     Syncope      Patient Active Problem List   Diagnosis    MENTAL HEALTH    Suicidal ideation    Suicidal ideations    Intellectual disability    Bipolar affective disorder, currently depressed, moderate (H)    Borderline personality disorder (H)    Morbid obesity (H)    Unspecified mood (affective) disorder (H24)     Past Surgical History:   Procedure Laterality Date    APPENDECTOMY      APPENDECTOMY         Allergies   Allergen Reactions    Penicillins Rash and Unknown       OUTPATIENT MEDICATIONS     New Prescriptions    No medications on file      Vitals:    02/24/24 1656 02/24/24 1730   BP: 122/50    Pulse: 87 77   Resp: 16    Temp: 97.8  F (36.6  C)    TempSrc: Oral    SpO2: 100% 100%       Physical Exam   Constitutional: Oriented to person, place, and time. Appears well-developed and well-nourished.   HEENT:    Head: Atraumatic.   Neck: Normal range of motion. Neck supple.   Cardiovascular: Normal rate, regular rhythm and normal heart sounds.    Pulmonary/Chest: Normal effort  and breath sounds normal.   Abdominal: Soft. Bowel sounds are normal.   Musculoskeletal: Normal range of motion.   Neurological: Alert and oriented to person, place, and time. Normal strength. No sensory deficit. No cranial nerve deficit.  Skin: Skin is warm and dry.   Psychiatric: Normal mood and affect. Behavior is normal. Thought content normal.     DIAGNOSTICS    LABORATORY FINDINGS (REVIEWED AND INTERPRETED):  Labs Ordered and Resulted from Time of ED Arrival to Time of ED Departure   COMPREHENSIVE METABOLIC PANEL - Normal       Result Value    Sodium 140      Potassium 4.0      Carbon Dioxide (CO2) 24      Anion Gap 9      Urea Nitrogen  8.7      Creatinine 0.89      GFR Estimate >90      Calcium 8.6      Chloride 107      Glucose 86      Alkaline Phosphatase 67      AST 23      ALT 39      Protein Total 6.7      Albumin 4.1      Bilirubin Total 0.2     HCG QUALITATIVE PREGNANCY - Normal    hCG Serum Qualitative Negative     LIPASE - Normal    Lipase 29     CBC WITH PLATELETS AND DIFFERENTIAL    WBC Count 6.7      RBC Count 4.40      Hemoglobin 12.2      Hematocrit 36.0      MCV 82      MCH 27.7      MCHC 33.9      RDW 13.2      Platelet Count 226      % Neutrophils 56      % Lymphocytes 37      % Monocytes 5      % Eosinophils 2      % Basophils 0      % Immature Granulocytes 0      NRBCs per 100 WBC 0      Absolute Neutrophils 3.7      Absolute Lymphocytes 2.5      Absolute Monocytes 0.3      Absolute Eosinophils 0.1      Absolute Basophils 0.0      Absolute Immature Granulocytes 0.0      Absolute NRBCs 0.0           IMAGING (REVIEWED AND INTERPRETED):  No orders to display                  I, Jaden Scott, am serving as a scribe to document services personally performed by Ambrocio Serna D.O., based on my observation and the provider s statements to me.    I, Ambrocio Serna D.O., attest that Jaden Scott is acting in a scribe capacity, has observed my performance of the services and has documented them in accordance with my direction.    Ambrocio Serna D.O.  EMERGENCY MEDICINE   02/24/24  [facility] EMERGENCY DEPARTMENT  [facility] [facility] [address] [phone]  Dept: [phone]     Ambrocio Serna DO  02/26/24 0024

## 2024-02-26 ENCOUNTER — HOSPITAL ENCOUNTER (EMERGENCY)
Facility: HOSPITAL | Age: 24
Discharge: LEFT BEFORE DISCHARGE | End: 2024-02-26
Admitting: STUDENT IN AN ORGANIZED HEALTH CARE EDUCATION/TRAINING PROGRAM
Payer: MEDICARE

## 2024-02-26 VITALS
DIASTOLIC BLOOD PRESSURE: 84 MMHG | OXYGEN SATURATION: 100 % | TEMPERATURE: 98.7 F | WEIGHT: 253 LBS | HEIGHT: 63 IN | RESPIRATION RATE: 20 BRPM | HEART RATE: 94 BPM | SYSTOLIC BLOOD PRESSURE: 152 MMHG | BODY MASS INDEX: 44.83 KG/M2

## 2024-02-26 PROCEDURE — 99281 EMR DPT VST MAYX REQ PHY/QHP: CPT

## 2024-02-26 PROCEDURE — 250N000011 HC RX IP 250 OP 636: Performed by: EMERGENCY MEDICINE

## 2024-02-26 RX ORDER — ONDANSETRON 4 MG/1
4 TABLET, ORALLY DISINTEGRATING ORAL ONCE
Status: COMPLETED | OUTPATIENT
Start: 2024-02-26 | End: 2024-02-26

## 2024-02-26 RX ADMIN — ONDANSETRON 4 MG: 4 TABLET, ORALLY DISINTEGRATING ORAL at 20:17

## 2024-02-26 ASSESSMENT — COLUMBIA-SUICIDE SEVERITY RATING SCALE - C-SSRS
1. IN THE PAST MONTH, HAVE YOU WISHED YOU WERE DEAD OR WISHED YOU COULD GO TO SLEEP AND NOT WAKE UP?: NO
2. HAVE YOU ACTUALLY HAD ANY THOUGHTS OF KILLING YOURSELF IN THE PAST MONTH?: NO
6. HAVE YOU EVER DONE ANYTHING, STARTED TO DO ANYTHING, OR PREPARED TO DO ANYTHING TO END YOUR LIFE?: NO

## 2024-02-27 ENCOUNTER — HOSPITAL ENCOUNTER (EMERGENCY)
Facility: HOSPITAL | Age: 24
Discharge: HOME | End: 2024-02-27
Attending: EMERGENCY MEDICINE | Admitting: EMERGENCY MEDICINE
Payer: MEDICARE

## 2024-02-27 VITALS
WEIGHT: 252 LBS | BODY MASS INDEX: 44.65 KG/M2 | TEMPERATURE: 98.7 F | HEART RATE: 105 BPM | SYSTOLIC BLOOD PRESSURE: 143 MMHG | OXYGEN SATURATION: 100 % | RESPIRATION RATE: 16 BRPM | HEIGHT: 63 IN | DIASTOLIC BLOOD PRESSURE: 76 MMHG

## 2024-02-27 DIAGNOSIS — R46.89 AGGRESSION: ICD-10-CM

## 2024-02-27 PROCEDURE — 99283 EMERGENCY DEPT VISIT LOW MDM: CPT

## 2024-02-27 ASSESSMENT — ACTIVITIES OF DAILY LIVING (ADL)
ADLS_ACUITY_SCORE: 39
ADLS_ACUITY_SCORE: 37

## 2024-02-27 NOTE — PROGRESS NOTES
Behavioral Health  Note    Behavioral Health  Spirituality Group Note    UNIT 4AW    Name: Sadaf Ross YOB: 2000   MRN: [id number] Age: 19 year old      Patient attended -led group, which included discussion of spirituality, coping with illness and building resilience.    Patient attended group for 1 hrs.    The patient actively participated in group discussion      Morgan Wilcox  Chaplain Resident  Pager [phone]     [place]

## 2024-02-27 NOTE — ED TRIAGE NOTES
Patient presents to ER via EMS for nausea vomiting that started this morning with mid upper abdominal pain.  Denies taking any medicine for the nausea/vomiting.
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
## 2024-02-29 ENCOUNTER — HOSPITAL ENCOUNTER (EMERGENCY)
Facility: HOSPITAL | Age: 24
Discharge: HOME | End: 2024-02-29
Attending: EMERGENCY MEDICINE | Admitting: EMERGENCY MEDICINE
Payer: MEDICARE

## 2024-02-29 VITALS
SYSTOLIC BLOOD PRESSURE: 142 MMHG | TEMPERATURE: 98.9 F | BODY MASS INDEX: 44.64 KG/M2 | DIASTOLIC BLOOD PRESSURE: 71 MMHG | OXYGEN SATURATION: 97 % | RESPIRATION RATE: 20 BRPM | WEIGHT: 252 LBS | HEART RATE: 90 BPM

## 2024-02-29 DIAGNOSIS — F60.3 BORDERLINE PERSONALITY DISORDER (H): ICD-10-CM

## 2024-02-29 DIAGNOSIS — F41.9 ANXIETY: ICD-10-CM

## 2024-02-29 PROCEDURE — 99283 EMERGENCY DEPT VISIT LOW MDM: CPT

## 2024-02-29 PROCEDURE — 250N000013 HC RX MED GY IP 250 OP 250 PS 637: Performed by: EMERGENCY MEDICINE

## 2024-02-29 RX ORDER — HYDROXYZINE HYDROCHLORIDE 25 MG/1
50 TABLET, FILM COATED ORAL ONCE
Status: COMPLETED | OUTPATIENT
Start: 2024-02-29 | End: 2024-02-29

## 2024-02-29 RX ADMIN — HYDROXYZINE HYDROCHLORIDE 50 MG: 25 TABLET, FILM COATED ORAL at 13:46

## 2024-02-29 ASSESSMENT — COLUMBIA-SUICIDE SEVERITY RATING SCALE - C-SSRS
3. HAVE YOU BEEN THINKING ABOUT HOW YOU MIGHT KILL YOURSELF?: YES
4. HAVE YOU HAD THESE THOUGHTS AND HAD SOME INTENTION OF ACTING ON THEM?: NO
BASED ON RESPONSES TO C-SSRS QS 1-6, WHAT IS THE PATIENT'S OVERALL RISK RATING FOR SUICIDE: MODERATE RISK
1. IN THE PAST MONTH, HAVE YOU WISHED YOU WERE DEAD OR WISHED YOU COULD GO TO SLEEP AND NOT WAKE UP?: YES
6. HAVE YOU EVER DONE ANYTHING, STARTED TO DO ANYTHING, OR PREPARED TO DO ANYTHING TO END YOUR LIFE?: YES
5. HAVE YOU STARTED TO WORK OUT OR WORKED OUT THE DETAILS OF HOW TO KILL YOURSELF? DO YOU INTEND TO CARRY OUT THIS PLAN?: YES
2. HAVE YOU ACTUALLY HAD ANY THOUGHTS OF KILLING YOURSELF IN THE PAST MONTH?: YES

## 2024-02-29 ASSESSMENT — ACTIVITIES OF DAILY LIVING (ADL)
ADLS_ACUITY_SCORE: 39
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
## 2024-02-29 NOTE — CONSULTS
"Diagnostic Evaluation Consultation  Crisis Assessment    Patient Name: Sadaf Ross  Age:  23 year old  Legal Sex: female  Gender Identity: female  Pronouns:   Race: White  Ethnicity: Not  or   Language: English      Patient was assessed: Virtual: [facility] Crisis Assessment Start Time: 1603 Crisis Assessment Stop Time: 1621  Patient location: [facility] EMERGENCY DEPARTMENT                             JNED-07    Referral Data and Chief Complaint  Sadaf Ross presents to the ED via police. Patient is presenting to the ED for the following concerns: Other (see comment) (altercation with roommate).   Factors that make the mental health crisis life threatening or complex are:  Pt is a 23 year old female who presents frequently to this ED for Mental health and to ED with Behavioral health concerns.  Patient was seen here 2 weeks ago for suicidal ideation and has been seen 9 times this month for similar complaints.  At this time patient is denying any mental health concerns and reports that she had a verbal altercation with her roommate which upset her greatly.  Patient lives in a group home and is her own guardian.  She reports today she got into a fight with her roommate about \"something stupid\".  She reports she attempted to leave the situation but her roommate followed her further escalated conflict.  Patient called police to get a break from the situation but now feels ready and able to return home.  Patient is denying any mental health symptoms, denies any current suicidal ideation, homicidal ideation or desire to harm herself..      Informed Consent and Assessment Methods  Explained the crisis assessment process, including applicable information disclosures and limits to confidentiality, assessed understanding of the process, and obtained consent to proceed with the assessment.  Assessment methods included conducting a formal interview with patient, review of " "medical records, collaboration with medical staff, and obtaining relevant collateral information from family and community providers when available.  : done     Patient response to interventions: verbalizes understanding  Coping skills were attempted to reduce the crisis:  walked away from the conflict     History of the Crisis   Signficant history of ED visits with similar presentation (10 this month) . Pt has hx of intellectual difficullty, borderline personality disorder, depression and PTSD.  No evidence or history of suicide attempts but does have random episodes of biting herself or scratching herself in a suicide \"attempt\".  Pt is functioning at baseline.    Brief Psychosocial History  Family:  Single, Children no  Support System:  PCA (Uncle \"I have alot of people are supportive of me\")  Employment Status:  disabled  Source of Income:  disability  Financial Environmental Concerns:  none  Current Hobbies:  music, puzzles, group/social activities, television/movies/videos  Barriers in Personal Life:  cognitive limitations, mental health concerns    Significant Clinical History  Current Anxiety Symptoms:   (Denies)  Current Depression/Trauma:   (Denies)  Current Somatic Symptoms:   (Denies)  Current Psychosis/Thought Disturbance:  impulsive, distractability  Current Eating Symptoms:   (Denies)  Chemical Use History:  Alcohol: None  Benzodiazepines: None  Opiates: None  Cocaine: None  Marijuana: None  Other Use: None   Past diagnosis:  ADHD, Anxiety Disorder, Bipolar Disorder, Depression, Personality Disorder, Suicide attempt(s), PTSD  Family history:  Anxiety Disorder, Depression  Past treatment:  Individual therapy, Case management, Psychiatric Medication Management, Supportive Living Environment (group home, snf house, etc), [facility]  Details of most recent treatment:  Pt lives in a group home ([facility]) that is staffed 24/7 ([phone]). Pt has a PCA.  She has " OP therapy and psychiatry. has a behavioral Analyst and states she thinks she has a mental health  as well.  Other relevant history:  Patient has not been inpatient in over 5 years.       Collateral Information  Is there collateral information: Yes     Collateral information name, relationship, phone number:  Spoke with group home staff at [phone]    What happened today: staff reported that the roommates are good friends but had a verbal altercation this afternoon.  The roommate is ready to apologize to patient as she would not allow patient to walk away from the fight.  Patient is welcome to return home.     What is different about patient's functioning: Staff report patient is functioning at baseline     Concern about alcohol/drug use:      What do you think the patient needs:      Has patient made comments about wanting to kill themselves/others: no    If d/c is recommended, can they take part in safety/aftercare planning:  no    Additional collateral information:        Risk Assessment  [place] Suicide Severity Rating Scale Full Clinical Version:  Suicidal Ideation  Q6 Suicide Behavior (Lifetime): yes          [place] Suicide Severity Rating Scale Recent:   Suicidal Ideation (Recent)  Q1 Wished to be Dead (Past Month): no  Q2 Suicidal Thoughts (Past Month): no  Q3 Suicidal Thought Method: no  Q4 Suicidal Intent without Specific Plan: no  Q5 Suicide Intent with Specific Plan: no  Within the Past 3 Months?: no  Level of Risk per Screen: moderate risk  Intensity of Ideation (Recent)  Most Severe Ideation Rating (Past 1 Month): 1  Frequency (Past 1 Month): Less than once a week  Duration (Past 1 Month): Fleeting, few seconds or minutes  Controllability (Past 1 Month): Easily able to control thoughts  Deterrents (Past 1 Month): Deterrents definitely stopped you from attempting suicide  Reasons for Ideation (Past 1 Month): Completely to get attention, revenge, or a reaction from others  Suicidal  Behavior (Recent)  Actual Attempt (Past 3 Months): No  Has subject engaged in non-suicidal self-injurious behavior? (Past 3 Months): Yes (Bit her hand 2 weeks ago)  Interrupted Attempts (Past 3 Months): No  Aborted or Self-Interrupted Attempt (Past 3 Months): No  Preparatory Acts or Behavior (Past 3 Months): No    Environmental or Psychosocial Events: loss of a loved one, helplessness/hopelessness, impulsivity/recklessness, other life stressors  Protective Factors: Protective Factors: strong bond to family unit, community support, or employment, responsibilities and duties to others, including pets and children, lives in a responsibly safe and stable environment, help seeking, able to access care without barriers, reality testing ability, supportive ongoing medical and mental health care relationships, constructive use of leisure time, enjoyable activities, resilience    Does the patient have thoughts of harming others? Feels Like Hurting Others: no  Previous Attempt to Hurt Others: no  Is the patient engaging in sexually inappropriate behavior?: no    Is the patient engaging in sexually inappropriate behavior?  no        Mental Status Exam   Affect: Appropriate  Appearance: Appropriate  Attention Span/Concentration: Attentive  Eye Contact: Engaged    Fund of Knowledge: Appropriate   Language /Speech Content: Fluent  Language /Speech Volume: Normal  Language /Speech Rate/Productions: Normal  Recent Memory: Intact  Remote Memory: Intact  Mood: Normal  Orientation to Person: Yes   Orientation to Place: Yes  Orientation to Time of Day: Yes  Orientation to Date: Yes     Situation (Do they understand why they are here?): Yes  Psychomotor Behavior: Normal  Thought Content: Clear  Thought Form: Intact     Mini-Cog Assessment  Number of Words Recalled:    Clock-Drawing Test:     Three Item Recall:    Mini-Cog Total Score:       Medication  Psychotropic medications:   Medication Orders - Psychiatric (From admission, onward)       None             Current Care Team  Patient Care Team:  [facility] as PCP - General (Clinic)  Chloe Alva APRN CNP as Nurse Practitioner (OB/Gyn)  Harley Esparza CNM as Assigned OBGYN Provider  Seble Jones PA-C as Physician Assistant    Diagnosis  Patient Active Problem List   Diagnosis Code    MENTAL HEALTH     Suicidal ideation R45.851    Suicidal ideations R45.851    Intellectual disability F79    Bipolar affective disorder, currently depressed, moderate (H) F31.32    Borderline personality disorder (H) F60.3    Morbid obesity (H) E66.01    Unspecified mood (affective) disorder (H24) F39       Primary Problem This Admission  Active Hospital Problems    Intellectual disability      *Borderline personality disorder (H)      Bipolar affective disorder, currently depressed, moderate (H)        Clinical Summary and Substantiation of Recommendations   After therapeutic assessment, intervention and aftercare planning by ED care team and LMHP and in consultation with attending provider, the patient's circumstances and mental state were appropriate for outpatient management and return to group home. Pt appears to be functioning at baseline and is no longer upset about the fight she had with her roommate. Pt currently denies having suicidal ideations, intent or plans. She is able to safety plan. She declined to get referrals for IOP or PHP programs. Pt is her own guardian. Pt appears satisfied with validation of feelings, coaching on calming and distraction activities and verbalizes understanding of plan. Patient voiced a desire to return home to her group home.                          Patient coping skills attempted to reduce the crisis:  walked away from the conflict    Disposition  Recommended disposition: Individual Therapy, Group Home, Medication Management, Programmatic Care        Reviewed case and recommendations with attending provider. Attending Name: Dr. Cornelius        Attending concurs with disposition: yes       Patient and/or validated legal guardian concurs with disposition:   yes       Final disposition:  discharge    Legal status on admission: Voluntary/Patient has signed consent for treatment    Assessment Details   Total duration spent with the patient: 20 min     CPT code(s) utilized: Non-Billable    YOSHI Driver, Psychotherapist  [facility] - Triage & Transition Services  Callback: [phone]

## 2024-02-29 NOTE — ED PROVIDER NOTES
EMERGENCY DEPARTMENT ENCOUNTER      NAME: Sadaf Ross  AGE: 23 year old female  YOB: 2000  MRN: [id number]  EVALUATION DATE & TIME: 2024  1:23 PM    PCP: [facility]    ED PROVIDER: Otilio Cornelius D.O.      Chief Complaint   Patient presents with    Suicidal       FINAL IMPRESSION:  1. Borderline personality disorder (H)    2. Anxiety        ED COURSE & MEDICAL DECISION MAKIN:27 PM I met with the patient to gather history and to perform my initial exam. I discussed the plan for care while in the Emergency Department.  4:21 PM I spoke with DEC who recommended to send patient home with outpatient follow up.          Pertinent Labs & Imaging studies reviewed. (See chart for details)  23 year old female presents to the Emergency Department for evaluation of suicidal ideation.  Patient reports she had a fight with her roommate and wanted to kill herself by self strangulation.  Patent has been seen frequently in the emergency department, and had a significant history of anxiety, borderline personality disorder, and bipolar disorder.  Patient does report some improvement after hydroxyzine.  Evaluated by DEC and at this time we do not believe her to be a risk to herself or others and believe she is appropriate for discharge back to her group home, and the group home is comfortable taking her back tonight.  Return precautions discussed.    Medical Decision Making  Obtained supplemental history:Supplemental history obtained?: No  Reviewed external records: External records reviewed?: Other: 2024 [facility] emergency department  Care impacted by chronic illness:Mental Health  Care significantly affected by social determinants of health:N/A  Did you consider but not order tests?: Work up considered but not performed and documented in chart, if applicable  Did you interpret images independently?: Independent interpretation of ECG and images noted in documentation, when  "applicable.  Consultation discussion with other provider:Did you involve another provider (consultant, MH, pharmacy, etc.)?: I discussed the care with another health care provider, see documentation for details.  Discharge. No recommendations on prescription strength medication(s). I considered admission, but discharged patient after significant clinical improvement.    At the conclusion of the encounter I discussed the results of all of the tests and the disposition. The questions were answered. The patient or family acknowledged understanding and was agreeable with the care plan.      HPI    Patient information was obtained from: Patient    Use of : N/A       Sadaf Ross is a 23 year old female who presents with suicidal ideation.    Per Chart Review: Patient was seen on 2/27/2024 at [facility] emergency department for evaluation of aggression.  Was in a verbal altercation with her roommate.  Patient was provided with extensive anticipatory guidance and given return precautions.  Told that it was important to follow-up with PMD.  Discharged to group facility.    Yesterday, patient got into an argument with her roommate and her roommate told her that she should \"go to [facility].\"  Patient says she now wants to kill herself by \"choking herself until she cannot breathe any longer.\"    Denies currently taking any mental health medications.  States past history of anxiety depression and has baseline suicidal ideation.  Denies currently having any hallucinations, fever, cough, or congestion.  Denies any current drug use or alcohol use.    Otherwise in normal state of health. No further concerns at this time.     REVIEW OF SYSTEMS  Constitutional:  Denies fever, chills, weight loss or weakness  Eyes:  No pain, discharge, redness  HENT:  Denies sore throat, ear pain, congestion  Respiratory: No SOB, wheeze or cough  Cardiovascular:  No CP, palpitations  GI:  Denies abdominal pain, nausea, " vomiting, diarrhea  : Denies dysuria, hematuria  Musculoskeletal:  Denies any new muscle/joint pain, swelling or loss of function.  Skin:  Denies rash, pallor  Neurologic:  Denies headache, focal weakness or sensory changes  Psych: Reports suicidal ideation.  Denies hallucinations.  Lymph: Denies swollen nodes    All other systems negative unless noted in HPI.    PAST MEDICAL HISTORY:  Past Medical History:   Diagnosis Date    ADHD (attention deficit hyperactivity disorder)     Bipolar 1 disorder (H)     Borderline personality disorder (H)     Depression     Depressive disorder     Intellectual disability     Obesity     Syncope        PAST SURGICAL HISTORY:  Past Surgical History:   Procedure Laterality Date    APPENDECTOMY      APPENDECTOMY           CURRENT MEDICATIONS:    No current facility-administered medications for this encounter.     Current Outpatient Medications   Medication    acetaminophen (TYLENOL) 325 MG tablet    albuterol (PROAIR HFA/PROVENTIL HFA/VENTOLIN HFA) 108 (90 Base) MCG/ACT inhaler    ARIPiprazole lauroxil ER (ARISTADA) 882 MG/3.2ML intra-muscular    bisacodyl (DULCOLAX) 5 MG EC tablet    calcium carbonate (TUMS) 500 MG chewable tablet    cyclobenzaprine (FLEXERIL) 10 MG tablet    dicyclomine (BENTYL) 20 MG tablet    docusate sodium (COLACE) 50 MG capsule    famotidine (PEPCID) 20 MG tablet    FLUoxetine (PROZAC) 40 MG capsule    hydrocortisone, Perianal, (HYDROCORTISONE) 2.5 % cream    hydrOXYzine (ATARAX) 50 MG tablet    ibuprofen (ADVIL/MOTRIN) 200 MG tablet    loperamide (IMODIUM A-D) 2 MG tablet    LORazepam (ATIVAN) 0.5 MG tablet    mupirocin (BACTROBAN) 2 % external ointment    OLANZapine zydis (ZYPREXA) 5 MG ODT    ondansetron (ZOFRAN) 4 MG tablet    ondansetron (ZOFRAN) 4 MG tablet    pantoprazole (PROTONIX) 40 MG EC tablet    polyethylene glycol (MIRALAX) 17 GM/Dose powder    promethazine (PHENERGAN) 12.5 MG tablet    sennosides (SENOKOT) 8.6 MG tablet    traZODone (DESYREL) 50  MG tablet    Vitamin D, Cholecalciferol, 25 MCG (1000 UT) TABS         ALLERGIES:  Allergies   Allergen Reactions    Penicillins Rash and Unknown       FAMILY HISTORY:  Family History   Problem Relation Age of Onset    Diabetes Type 1 Father     Cancer Paternal Grandfather        SOCIAL HISTORY:  Social History     Socioeconomic History    Marital status: Single   Tobacco Use    Smoking status: Some Days     Packs/day: 0.25     Years: 5.00     Additional pack years: 0.00     Total pack years: 1.25     Types: Cigarettes    Smokeless tobacco: Never   Substance and Sexual Activity    Alcohol use: No    Drug use: No    Sexual activity: Not Currently     Partners: Female, Male     Birth control/protection: Pill, Injection       VITALS:  Patient Vitals for the past 24 hrs:   BP Temp Temp src Pulse Resp SpO2 Weight   02/29/24 1510 (!) 145/61 -- -- 80 -- -- --   02/29/24 1309 138/87 98.9  F (37.2  C) Temporal 89 20 100 % 114.3 kg (252 lb)       PHYSICAL EXAM    VITAL SIGNS: BP (!) 145/61   Pulse 80   Temp 98.9  F (37.2  C) (Temporal)   Resp 20   Wt 114.3 kg (252 lb)   LMP  (LMP Unknown)   SpO2 100%   BMI 44.64 kg/m      Vitals: BP (!) 145/61   Pulse 80   Temp 98.9  F (37.2  C) (Temporal)   Resp 20   Wt 114.3 kg (252 lb)   LMP  (LMP Unknown)   SpO2 100%   BMI 44.64 kg/m    General: Appears in no acute distress, awake, alert, interactive.  Eyes: Conjunctivae non-injected. Sclera anicteric.  HENT: Atraumatic, normocephalic  Neck: Supple, normal ROM  Respiratory/Chest: Respiration unlabored, no tachypnea  Abdomen: non distended  Musculoskeletal: Normal extremities. No edema or erythema.  Skin: Normal color. No rash or diaphoresis.  Neurologic: Alert and oriented, face symmetric, moves all extremities spontaneously. Speech clear.  Psychiatric:  Affect blunt, Judgment normal, Mood normal.        LABS  No results found for this or any previous visit (from the past 24 hour(s)).      RADIOLOGY  No orders to display             MEDICATIONS GIVEN IN THE EMERGENCY:  Medications   hydrOXYzine HCl (ATARAX) tablet 50 mg (50 mg Oral $Given 2/29/24 1346)       NEW PRESCRIPTIONS STARTED AT TODAY'S ER VISIT  New Prescriptions    No medications on file        I, Susie Albrecht, am serving as a scribe to document services personally performed by Otilio Cornelius D.O., based on my observations and the provider's statements to me.  I, Otilio Cornelius D.O., attest that Susie Albrecht is acting in a scribe capacity, has observed my performance of the services and has documented them in accordance with my direction.     Otilio Cornelius D.O.  Emergency Medicine  [facility] EMERGENCY DEPARTMENT  [facility] [facility] [id number] [phone]  Dept: [phone]       Otilio Cornelius DO  02/29/24 2003

## 2024-02-29 NOTE — ED NOTES
Writer set up stretcher transport to return back to group home.  Spoke with Elaina.  About an hour to two out.

## 2024-03-02 ENCOUNTER — HOSPITAL ENCOUNTER (EMERGENCY)
Facility: HOSPITAL | Age: 24
Discharge: NURSING, REHAB OR LONG-TERM CARE | End: 2024-03-02
Attending: EMERGENCY MEDICINE | Admitting: EMERGENCY MEDICINE
Payer: MEDICARE

## 2024-03-02 VITALS
HEIGHT: 63 IN | SYSTOLIC BLOOD PRESSURE: 112 MMHG | BODY MASS INDEX: 45 KG/M2 | HEART RATE: 77 BPM | OXYGEN SATURATION: 99 % | RESPIRATION RATE: 16 BRPM | TEMPERATURE: 98.5 F | DIASTOLIC BLOOD PRESSURE: 61 MMHG | WEIGHT: 254 LBS

## 2024-03-02 DIAGNOSIS — R11.2 NAUSEA AND VOMITING, UNSPECIFIED VOMITING TYPE: ICD-10-CM

## 2024-03-02 LAB
ALBUMIN SERPL BCG-MCNC: 4.6 G/DL (ref 3.5–5.2)
ALP SERPL-CCNC: 75 U/L (ref 40–150)
ALT SERPL W P-5'-P-CCNC: 32 U/L (ref 0–50)
ANION GAP SERPL CALCULATED.3IONS-SCNC: 17 MMOL/L (ref 7–15)
AST SERPL W P-5'-P-CCNC: 34 U/L (ref 0–45)
BASOPHILS # BLD AUTO: 0 10E3/UL (ref 0–0.2)
BASOPHILS NFR BLD AUTO: 0 %
BILIRUB SERPL-MCNC: 0.4 MG/DL
BUN SERPL-MCNC: 15.5 MG/DL (ref 6–20)
CALCIUM SERPL-MCNC: 9.6 MG/DL (ref 8.6–10)
CHLORIDE SERPL-SCNC: 102 MMOL/L (ref 98–107)
CREAT SERPL-MCNC: 0.97 MG/DL (ref 0.51–0.95)
DEPRECATED HCO3 PLAS-SCNC: 21 MMOL/L (ref 22–29)
EGFRCR SERPLBLD CKD-EPI 2021: 83 ML/MIN/1.73M2
EOSINOPHIL # BLD AUTO: 0 10E3/UL (ref 0–0.7)
EOSINOPHIL NFR BLD AUTO: 0 %
ERYTHROCYTE [DISTWIDTH] IN BLOOD BY AUTOMATED COUNT: 13 % (ref 10–15)
FLUAV RNA SPEC QL NAA+PROBE: NEGATIVE
FLUBV RNA RESP QL NAA+PROBE: NEGATIVE
GLUCOSE SERPL-MCNC: 122 MG/DL (ref 70–99)
HCT VFR BLD AUTO: 35.5 % (ref 35–47)
HGB BLD-MCNC: 12 G/DL (ref 11.7–15.7)
IMM GRANULOCYTES # BLD: 0.1 10E3/UL
IMM GRANULOCYTES NFR BLD: 1 %
LIPASE SERPL-CCNC: 29 U/L (ref 13–60)
LYMPHOCYTES # BLD AUTO: 2.5 10E3/UL (ref 0.8–5.3)
LYMPHOCYTES NFR BLD AUTO: 18 %
MCH RBC QN AUTO: 27.6 PG (ref 26.5–33)
MCHC RBC AUTO-ENTMCNC: 33.8 G/DL (ref 31.5–36.5)
MCV RBC AUTO: 82 FL (ref 78–100)
MONOCYTES # BLD AUTO: 0.6 10E3/UL (ref 0–1.3)
MONOCYTES NFR BLD AUTO: 4 %
NEUTROPHILS # BLD AUTO: 10.7 10E3/UL (ref 1.6–8.3)
NEUTROPHILS NFR BLD AUTO: 77 %
NRBC # BLD AUTO: 0 10E3/UL
NRBC BLD AUTO-RTO: 0 /100
PLATELET # BLD AUTO: 256 10E3/UL (ref 150–450)
POTASSIUM SERPL-SCNC: 4.1 MMOL/L (ref 3.4–5.3)
PROT SERPL-MCNC: 8 G/DL (ref 6.4–8.3)
RBC # BLD AUTO: 4.35 10E6/UL (ref 3.8–5.2)
RSV RNA SPEC NAA+PROBE: NEGATIVE
SARS-COV-2 RNA RESP QL NAA+PROBE: NEGATIVE
SODIUM SERPL-SCNC: 140 MMOL/L (ref 135–145)
WBC # BLD AUTO: 14 10E3/UL (ref 4–11)

## 2024-03-02 PROCEDURE — 99284 EMERGENCY DEPT VISIT MOD MDM: CPT | Mod: 25

## 2024-03-02 PROCEDURE — 96374 THER/PROPH/DIAG INJ IV PUSH: CPT

## 2024-03-02 PROCEDURE — 250N000011 HC RX IP 250 OP 636: Performed by: EMERGENCY MEDICINE

## 2024-03-02 PROCEDURE — 80053 COMPREHEN METABOLIC PANEL: CPT | Performed by: STUDENT IN AN ORGANIZED HEALTH CARE EDUCATION/TRAINING PROGRAM

## 2024-03-02 PROCEDURE — 36415 COLL VENOUS BLD VENIPUNCTURE: CPT | Performed by: STUDENT IN AN ORGANIZED HEALTH CARE EDUCATION/TRAINING PROGRAM

## 2024-03-02 PROCEDURE — 85025 COMPLETE CBC W/AUTO DIFF WBC: CPT | Performed by: STUDENT IN AN ORGANIZED HEALTH CARE EDUCATION/TRAINING PROGRAM

## 2024-03-02 PROCEDURE — 83690 ASSAY OF LIPASE: CPT | Performed by: EMERGENCY MEDICINE

## 2024-03-02 PROCEDURE — 87637 SARSCOV2&INF A&B&RSV AMP PRB: CPT | Performed by: STUDENT IN AN ORGANIZED HEALTH CARE EDUCATION/TRAINING PROGRAM

## 2024-03-02 RX ORDER — ONDANSETRON 2 MG/ML
4 INJECTION INTRAMUSCULAR; INTRAVENOUS ONCE
Status: COMPLETED | OUTPATIENT
Start: 2024-03-02 | End: 2024-03-02

## 2024-03-02 RX ADMIN — ONDANSETRON 4 MG: 2 INJECTION INTRAMUSCULAR; INTRAVENOUS at 18:45

## 2024-03-02 ASSESSMENT — ACTIVITIES OF DAILY LIVING (ADL)
ADLS_ACUITY_SCORE: 39
ADLS_ACUITY_SCORE: 39

## 2024-03-03 NOTE — DISCHARGE INSTRUCTIONS
Continue previously prescribed Protonix, Pepcid and Zofran.  See your primary care physician in follow-up next week.  Return to the emergency department if you have recurrent vomiting despite the use of these medications or abdominal pain that does not improve with Tylenol.

## 2024-03-03 NOTE — ED NOTES
Bed: JNED-  Expected date: 3/2/24  Expected time: 6:03 PM  Means of arrival: Ambulance  Comments:  [place] - nausea

## 2024-03-03 NOTE — ED PROVIDER NOTES
"EMERGENCY DEPARTMENT NOTE     Name: Sadaf Ross    Age/Sex: 23 year old female   MRN: [id number]   Evaluation Date & Time:  3/2/2024  6:18 PM    PCP:    [facility]   ED Provider: Ambrocio Serna D.O.       CHIEF COMPLAINT    Nausea, Vomiting, & Diarrhea       DIAGNOSIS & DISPOSITION/MEDICAL DECISION MAKING   No diagnosis found.    Sadaf Ross is a 23 year old female who presents to the emergency department for evaluation of nausea and vomiting.    Patient reports that she has had vomiting all day along with central abdominal pain. Patient previously had diarrhea that resolved, but now is loose stools again. Denies any other complaints at this time.     Differential diagnosis considered included but not limited to obstruction or perforation, cholecystitis, pancreatitis, appendicitis or other process including GERD, influenza or COVID    Medical Decision Making  Patient on exam had minimal midepigastric tenderness.  Diagnostic studies:  WBC 14,000, hemoglobin 12, has metabolic profile without significant abnormality,lipase nonelevated  Patient received IV fluids and advance to trauma with improvement in symptoms.  Able to take oral fluids without recurrent vomiting.  Serial abdominal exams currently nontender.  Low suspicion for intra-abdominal process requiring imaging.  May be related to GERD.  Patient will be discharged to continue previously prescribed Protonix Pepcid and Zofran.  Follow-up with primary care physician next week.  Return criteria discussed if recurrent vomiting despite use of Zofran or abdominal pain not improved with Tylenol return to the emergency department.  Interventions:IV  Fluids, ondansetron  Discharge Vital Signs:/61   Pulse 77   Temp 98.5  F (36.9  C) (Oral)   Resp 16   Ht 1.6 m (5' 3\")   Wt 115.2 kg (254 lb)   LMP  (LMP Unknown)   SpO2 99%   BMI 44.99 kg/m       DISPOSITION: Home    Diagnostic studies:  Imaging:  No orders to display      Lab:  Labs Ordered " and Resulted from Time of ED Arrival to Time of ED Departure - No data to display            Triage note reviewed:BIBA nausea, vomiting, and diarrhea x 5 days. Chills.     Triage Assessment (Adult)       Row Name 03/02/24 1821          Triage Assessment    Airway WDL WDL        Respiratory WDL    Respiratory WDL WDL        Skin Circulation/Temperature WDL    Skin Circulation/Temperature WDL WDL        Cardiac WDL    Cardiac WDL WDL        Peripheral/Neurovascular WDL    Peripheral Neurovascular WDL WDL        Cognitive/Neuro/Behavioral WDL    Cognitive/Neuro/Behavioral WDL WDL                         History:  Supplemental history from: NA  External Record(s) reviewed: Documented in chart and Inpatient Record: [facility] ED on 2/24/24 Primary care office visit February 26, 2024 and February 27, 2024,feb 29,2024    Work Up:  Chart documentation includes differential considered and any EKGs or imaging independently interpreted by provider, where specified.  In additional to work up documented, I considered the following work up: Abdominal imaging including CT but low suspicion for acute intra-abdominal process and deferred    External consultation:  Discussion of management with another provider: NA    Complicating factors:  Care impacted by chronic illness: Mental Health  Care affected by social determinants of health: N/A    Disposition considerations: Discharge. I recommended the patient continue their current prescription strength medication(s): Being Pepcid, Protonix and Zofran. N/A.    At the conclusion of the encounter I discussed the results of all of the tests and the disposition. The questions were answered. The patient or family acknowledged understanding and was agreeable with the care plan.    TOTAL CRITICAL CARE TIME (EXCLUDING PROCEDURES): Not applicable    PROCEDURES:   None    EMERGENCY DEPARTMENT COURSE   6:57 PM I met with the patient to gather history and to perform my initial exam.  We discussed  treatment options and the plan for care while in the Emergency Department.    ED INTERVENTIONS     Medications   ondansetron (ZOFRAN) injection 4 mg (4 mg Intravenous $Given 3/2/24 9973)       DISCHARGE MEDICATIONS        Review of your medicines        UNREVIEWED medicines. Ask your doctor about these medicines        Dose / Directions   acetaminophen 325 MG tablet  Commonly known as: TYLENOL      Dose: 650 mg  Take 650 mg by mouth every 6 hours as needed for mild pain  Refills: 0     albuterol 108 (90 Base) MCG/ACT inhaler  Commonly known as: PROAIR HFA/PROVENTIL HFA/VENTOLIN HFA      Dose: 2 puff  Inhale 2 puffs into the lungs every 6 hours as needed for shortness of breath, wheezing or cough  Quantity: 18 g  Refills: 0     ARIPiprazole lauroxil  MG/3.2ML intra-muscular  Commonly known as: ARISTADA      Dose: 882 mg  Inject 882 mg into the muscle every 28 days On the 22nd of each month  Refills: 0     bisacodyl 5 MG EC tablet  Commonly known as: DULCOLAX      Dose: 5 mg  Take 1 tablet (5 mg) by mouth daily as needed for constipation  Quantity: 30 tablet  Refills: 0     calcium carbonate 500 MG chewable tablet  Commonly known as: TUMS      Dose: 1 chew tab  Take 1 chew tab by mouth 2 times daily as needed for heartburn  Refills: 0     cyclobenzaprine 10 MG tablet  Commonly known as: FLEXERIL      Dose: 10 mg  Take 10 mg by mouth 3 times daily as needed for muscle spasms  Refills: 0     dicyclomine 20 MG tablet  Commonly known as: BENTYL      Dose: 20 mg  Take 20 mg by mouth 2 times daily as needed (dyspepsia)  Refills: 0     docusate sodium 50 MG capsule  Commonly known as: COLACE      Dose: 100 mg  Take 100 mg by mouth 2 times daily  Refills: 0     famotidine 20 MG tablet  Commonly known as: PEPCID      Dose: 20 mg  Take 20 mg by mouth daily  Refills: 0     FLUoxetine 40 MG capsule  Commonly known as: PROzac      Dose: 80 mg  Take 80 mg by mouth daily  Refills: 0     hydrocortisone (Perianal) 2.5 %  cream  Commonly known as: hydrocortisone      Place rectally 2 times daily as needed for hemorrhoids  Quantity: 30 g  Refills: 0     hydrOXYzine HCl 50 MG tablet  Commonly known as: ATARAX      Dose: 50 mg  Take 50 mg by mouth 2 times daily as needed for anxiety  Refills: 0     ibuprofen 200 MG tablet  Commonly known as: ADVIL/MOTRIN      Dose: 400 mg  Take 2 tablets (400 mg) by mouth every 6 hours as needed for pain  Quantity: 60 tablet  Refills: 0     loperamide 2 MG tablet  Commonly known as: IMODIUM A-D      Take 2 tablets (4 mg) in the morning.  Take 1 tablet (2 mg) with every loose stool.  Do not exceed 8 tablets in a day.  Quantity: 30 tablet  Refills: 0     LORazepam 0.5 MG tablet  Commonly known as: ATIVAN      Dose: 0.5 mg  Take 0.5 mg by mouth once as needed for anxiety Give as needed any time she has the urge to call 911 or to visit the ER  Refills: 0     mupirocin 2 % external ointment  Commonly known as: BACTROBAN      Apply topically 3 times daily  Quantity: 15 g  Refills: 0     OLANZapine zydis 5 MG ODT  Commonly known as: zyPREXA      Dose: 5 mg  Take 1 tablet (5 mg) by mouth At Bedtime  Quantity: 30 tablet  Refills: 0     * ondansetron 4 MG tablet  Commonly known as: ZOFRAN  Indication: Nausea and Vomiting      Dose: 4 mg  Take 4 mg by mouth every 8 hours as needed for nausea  Refills: 0     * ondansetron 4 MG tablet  Commonly known as: Zofran      Dose: 4 mg  Take 1 tablet (4 mg) by mouth every 8 hours as needed  Quantity: 15 tablet  Refills: 0     pantoprazole 40 MG EC tablet  Commonly known as: PROTONIX      Dose: 40 mg  Take 40 mg by mouth daily  Refills: 0     polyethylene glycol 17 GM/Dose powder  Commonly known as: MIRALAX      Dose: 17 g  Take 17 g by mouth daily  Refills: 0     promethazine 12.5 MG tablet  Commonly known as: PHENERGAN      Dose: 12.5 mg  Take 12.5 mg by mouth every 4 hours  Refills: 0     sennosides 8.6 MG tablet  Commonly known as: SENOKOT      Dose: 1 tablet  Take 1  tablet by mouth 2 times daily as needed for constipation  Refills: 0     traZODone 50 MG tablet  Commonly known as: DESYREL      Dose: 100 mg  Take 100 mg by mouth At Bedtime  Refills: 0     Vitamin D3 25 mcg (1000 units) tablet  Commonly known as: CHOLECALCIFEROL      Dose: 1,000 Units  Take 1,000 Units by mouth daily  Refills: 0           * This list has 2 medication(s) that are the same as other medications prescribed for you. Read the directions carefully, and ask your doctor or other care provider to review them with you.                    INFORMATION SOURCE AND LIMITATIONS    History/Exam limitations: None  Patient information was obtained from: Patient  Use of : N/A    HISTORY OF PRESENT ILLNESS   Sadaf Ross is a 23 year old year old female who presents to this ED by walk in for evaluation of nausea and vomiting.    Per chart review: Patient was seen at [facility] ED on 2/24/24 for evaluation of nausea and vomiting. Labs unremarkable. Was given IV fluids and Zofran. Was discharged back home with Zofran.     Patient reports that she has had vomiting all day along with central abdominal pain. Patient previously had diarrhea that resolved, but now is loose stools again. Denies any other complaints at this time.     REVIEW OF SYSTEMS:   All other systems reviewed and are negative except as noted above in HPI.    PATIENT HISTORY     Past Medical History:   Diagnosis Date    ADHD (attention deficit hyperactivity disorder)     Bipolar 1 disorder (H)     Borderline personality disorder (H)     Depression     Depressive disorder     Intellectual disability     Obesity     Syncope      Patient Active Problem List   Diagnosis    MENTAL HEALTH    Suicidal ideation    Suicidal ideations    Intellectual disability    Bipolar affective disorder, currently depressed, moderate (H)    Borderline personality disorder (H)    Morbid obesity (H)    Unspecified mood (affective) disorder (H24)     Past Surgical  "History:   Procedure Laterality Date    APPENDECTOMY      APPENDECTOMY         Allergies   Allergen Reactions    Penicillins Rash and Unknown       OUTPATIENT MEDICATIONS     New Prescriptions    No medications on file      Vitals:    03/02/24 1820 03/02/24 1823 03/02/24 1830 03/02/24 1845   BP: 129/75  117/63 109/58   Pulse: 111 106 106 101   Resp: 16      Temp: 98.5  F (36.9  C)      TempSrc: Oral      SpO2: 98% 98% 97% 98%   Weight:  115.2 kg (254 lb)     Height:  1.6 m (5' 3\")         Physical Exam   Constitutional: Oriented to person, place, and time. Appears well-developed and well-nourished.   Cardiovascular: Normal rate, regular rhythm and normal heart sounds.    Pulmonary/Chest: Normal effort  and breath sounds normal.   Abdominal: Soft. Bowel sounds are normal. Epigastric tenderness.   Psychiatric: Normal mood and affect. Behavior is normal. Thought content normal.     DIAGNOSTICS    LABORATORY FINDINGS (REVIEWED AND INTERPRETED):  Labs Ordered and Resulted from Time of ED Arrival to Time of ED Departure - No data to display      IMAGING (REVIEWED AND INTERPRETED):  No orders to display         IStephen, am serving as a scribe to document services personally performed by Ambrocio Serna D.O., based on my observation and the provider s statements to me.    IAmbrocio D.O., attest that Stephen Francois is acting in a scribe capacity, has observed my performance of the services and has documented them in accordance with my direction.    Ambrocio Serna D.O.  EMERGENCY MEDICINE   03/02/24  [facility] EMERGENCY DEPARTMENT  [address] [place] [id number]  [phone]  Dept: [phone]     Ambrocio Serna DO  03/02/24 2012    "

## 2024-03-03 NOTE — ED NOTES
Discharge    Discharge paperwork discussed. Patient questions answered. AVS in hand. Waiting for wheelchair transport back to group home. Patient understanding and agreeable of discharge.

## 2024-03-04 ENCOUNTER — HOSPITAL ENCOUNTER (EMERGENCY)
Facility: HOSPITAL | Age: 24
Discharge: LEFT BEFORE DISCHARGE | End: 2024-03-04
Admitting: EMERGENCY MEDICINE
Payer: MEDICARE

## 2024-03-04 ENCOUNTER — HOSPITAL ENCOUNTER (EMERGENCY)
Facility: HOSPITAL | Age: 24
Discharge: HOME | End: 2024-03-04
Admitting: STUDENT IN AN ORGANIZED HEALTH CARE EDUCATION/TRAINING PROGRAM
Payer: MEDICARE

## 2024-03-04 VITALS
TEMPERATURE: 98.8 F | RESPIRATION RATE: 16 BRPM | DIASTOLIC BLOOD PRESSURE: 94 MMHG | HEIGHT: 63 IN | SYSTOLIC BLOOD PRESSURE: 150 MMHG | WEIGHT: 254 LBS | BODY MASS INDEX: 45 KG/M2 | OXYGEN SATURATION: 100 % | HEART RATE: 98 BPM

## 2024-03-04 VITALS
SYSTOLIC BLOOD PRESSURE: 144 MMHG | HEART RATE: 87 BPM | DIASTOLIC BLOOD PRESSURE: 93 MMHG | TEMPERATURE: 98.6 F | WEIGHT: 254 LBS | OXYGEN SATURATION: 100 % | RESPIRATION RATE: 18 BRPM | HEIGHT: 63 IN | BODY MASS INDEX: 45 KG/M2

## 2024-03-04 DIAGNOSIS — R11.2 NAUSEA AND VOMITING: ICD-10-CM

## 2024-03-04 LAB
ALBUMIN UR-MCNC: NEGATIVE MG/DL
APPEARANCE UR: CLEAR
BILIRUB UR QL STRIP: NEGATIVE
COLOR UR AUTO: COLORLESS
GLUCOSE UR STRIP-MCNC: NEGATIVE MG/DL
HCG UR QL: NEGATIVE
HGB UR QL STRIP: NEGATIVE
KETONES UR STRIP-MCNC: NEGATIVE MG/DL
LEUKOCYTE ESTERASE UR QL STRIP: NEGATIVE
MUCOUS THREADS #/AREA URNS LPF: PRESENT /LPF
NITRATE UR QL: NEGATIVE
PH UR STRIP: 7 [PH] (ref 5–7)
RBC URINE: <1 /HPF
SP GR UR STRIP: 1.01 (ref 1–1.03)
SQUAMOUS EPITHELIAL: 1 /HPF
UROBILINOGEN UR STRIP-MCNC: <2 MG/DL
WBC URINE: 0 /HPF

## 2024-03-04 PROCEDURE — 81025 URINE PREGNANCY TEST: CPT | Performed by: EMERGENCY MEDICINE

## 2024-03-04 PROCEDURE — 99283 EMERGENCY DEPT VISIT LOW MDM: CPT | Mod: 25

## 2024-03-04 PROCEDURE — 99281 EMR DPT VST MAYX REQ PHY/QHP: CPT | Mod: 27

## 2024-03-04 PROCEDURE — 81001 URINALYSIS AUTO W/SCOPE: CPT | Performed by: EMERGENCY MEDICINE

## 2024-03-04 ASSESSMENT — COLUMBIA-SUICIDE SEVERITY RATING SCALE - C-SSRS
2. HAVE YOU ACTUALLY HAD ANY THOUGHTS OF KILLING YOURSELF IN THE PAST MONTH?: NO
1. IN THE PAST MONTH, HAVE YOU WISHED YOU WERE DEAD OR WISHED YOU COULD GO TO SLEEP AND NOT WAKE UP?: NO
6. HAVE YOU EVER DONE ANYTHING, STARTED TO DO ANYTHING, OR PREPARED TO DO ANYTHING TO END YOUR LIFE?: NO

## 2024-03-04 NOTE — ED TRIAGE NOTES
Pt vomited x2 at 0200. Endorses nausea and difficulty eating.       Pt care plan flagged in triage.

## 2024-03-05 ENCOUNTER — HOSPITAL ENCOUNTER (EMERGENCY)
Facility: HOSPITAL | Age: 24
Discharge: LEFT BEFORE DISCHARGE | End: 2024-03-05
Attending: EMERGENCY MEDICINE | Admitting: EMERGENCY MEDICINE
Payer: MEDICARE

## 2024-03-05 VITALS
HEART RATE: 86 BPM | HEIGHT: 63 IN | SYSTOLIC BLOOD PRESSURE: 148 MMHG | RESPIRATION RATE: 18 BRPM | BODY MASS INDEX: 44.83 KG/M2 | OXYGEN SATURATION: 100 % | WEIGHT: 253 LBS | DIASTOLIC BLOOD PRESSURE: 72 MMHG | TEMPERATURE: 97.8 F

## 2024-03-05 DIAGNOSIS — Z13.9 ENCOUNTER FOR MEDICAL SCREENING EXAMINATION: ICD-10-CM

## 2024-03-05 PROCEDURE — 99281 EMR DPT VST MAYX REQ PHY/QHP: CPT

## 2024-03-05 ASSESSMENT — ACTIVITIES OF DAILY LIVING (ADL)
ADLS_ACUITY_SCORE: 37

## 2024-03-05 ASSESSMENT — COLUMBIA-SUICIDE SEVERITY RATING SCALE - C-SSRS
6. HAVE YOU EVER DONE ANYTHING, STARTED TO DO ANYTHING, OR PREPARED TO DO ANYTHING TO END YOUR LIFE?: NO
1. IN THE PAST MONTH, HAVE YOU WISHED YOU WERE DEAD OR WISHED YOU COULD GO TO SLEEP AND NOT WAKE UP?: NO
2. HAVE YOU ACTUALLY HAD ANY THOUGHTS OF KILLING YOURSELF IN THE PAST MONTH?: NO

## 2024-03-05 NOTE — ED PROVIDER NOTES
"EMERGENCY DEPARTMENT ENCOUNTER      I went to see patient in the ER and she left without being seen within a few minutes of triage, left without being seen also yesterday, suspicion per nursing she was using ED visit transportation to get to area overall, and then leaves without medical evaluation. I called her listed cell phone number [phone] and she reports she left the ER because \"I wanted to leave\" and \"my own transportation\" picked her up through \"my insurance\", voices in background asking \"who is it?\", patient then hung up phone.      Elisabeth Kwok MD  03/05/24 [id number]    "
no

## 2024-03-05 NOTE — DISCHARGE INSTRUCTIONS
As we discussed, please follow-up with GI for further testing given your ongoing pain and nausea.  I have placed a referral and provided their phone number.  Please call them tomorrow to schedule this.  In the meantime, if you develop a fever, return of uncontrolled vomiting, concerns for dehydration, worsening abdominal pain or any new or concerning symptoms please return to the ER for further evaluation.      Your blood pressure was elevated in the emergencydepartment today and requires recheck and close follow-up in your primary care clinic. Untreated blood pressure can cause serious complications including, but not limited to stroke, heart attack/failure, and kidney disease.  Please make a close follow-up appointment to have this recheck performed. Please return to the emergency department immediately if you develop a severe headache, vision changes, chest pain, shortness of breath, orabdominal pain.

## 2024-03-05 NOTE — ED TRIAGE NOTES
Pt reports migraine and dizziness for past hour, pt hasn't taken any OTC medications at this time.  Pt reports hx of migraines in the past.       Triage Assessment (Adult)       Row Name 03/05/24 1636          Triage Assessment    Airway WDL WDL        Respiratory WDL    Respiratory WDL WDL        Skin Circulation/Temperature WDL    Skin Circulation/Temperature WDL WDL        Cardiac WDL    Cardiac WDL WDL        Peripheral/Neurovascular WDL    Peripheral Neurovascular WDL WDL        Cognitive/Neuro/Behavioral WDL    Cognitive/Neuro/Behavioral WDL WDL

## 2024-03-05 NOTE — ED TRIAGE NOTES
Pt arrives via EMS from home for nausea and vomiting. Pt was here earlier today as well. Pt was also seen on 3/2/24 with an unremarkable workup. Pt reports she threw up at 2am this morning.

## 2024-03-05 NOTE — ED PROVIDER NOTES
Emergency Department Encounter   NAME: Sadaf Ross ; AGE: 23 year old female ; YOB: 2000 ; MRN: [id number] ; PCP: Slaina, Nancy   ED PROVIDER: Arianna Lamar PA-C    Evaluation Date & Time:   No admission date for patient encounter.    CHIEF COMPLAINT:  Nausea & Vomiting      Impression and Plan   MDM:   Sadaf Ross is a 23 year old female with a pertinent history of anxiety, bipolar 1 disorder, depression and borderline personality disorder who presents to the ED by EMS for evaluation of nausea and vomiting.  Per patient, she states that she has had ongoing nausea and vomiting since her father passed away in 2021 and struggles with chronic abdominal pain.  She states that her nausea and vomiting flared up again around 2 AM this morning, and she had 3 episodes of vomiting at home.  Her nausea has since subsided, and she is drinking tea here in the emergency department without return of nausea and tolerating this well.  She is clinically well-appearing and nontoxic.  Afebrile and vitally stable though moderately hypertensive.  Her mucous membranes are moist, she has good color, no tachycardia or any evidence of severe dehydration or hypovolemia.  She states that her abdomen is not hurting her at this time and her abdominal exam is completely benign without any reproducible tenderness or evidence of peritonitis.  Given this, very low suspicion for any emergent pathology in abdomen or pelvis such as pancreatitis, symptomatic cholelithiasis, choledocholithiasis, SBO, nephrolithiasis.  She was in our emergency department earlier this morning and the left without being seen.  She did provide a urine for discharge which was negative for signs of infection and her pregnancy test was negative as well which is reassuring.  Discussed options for workup with patient at length.  She has been seen in the emergency department 20x in the past month, majority for mental health though multiple visits for  nausea/vomiting/pain with reassuring laboratory workups.  She does have a unique treatment plan through [facility] [place] stating that if she does not have any acute new symptoms, both medically and mental health wise, it should be considered to have her return to her group home with medical transport as she has an active outpatient team for support.  Discussed IV hydration and further antiemetics here in the emergency department, however patient declined as her nausea is improved and she is tolerating p.o.  We discussed repeating laboratory workup, however she states that this is checked every time she comes to the ER with no new findings, and given resolution of her symptoms she would not like to do this today.  With a completely benign abdominal exam, well appearance and passing p.o. challenge, do not feel that emergent abdominal imaging is warranted.  Given this, patient is comfortable returning to her group home and is requesting a ride.  She states that she has been given referrals to GI in the past, however has never followed up.  New referral placed and follow-up information for [facility] provided.  Reviewed concerning signs and symptoms to return to the emergency department, and I advised that she continue her home Zofran, Pepcid, and Protonix for symptomatic relief.  She verbalized understanding was discharged in stable condition.      *Patient examination and workup was initiated in triage due to inpatient hospital and Emergency Department bed shortage resulting in long waiting room wait times. Patient and/or guardian's consent was obtained.       Medical Decision Making    History:  Supplemental history from: N/A  External Record(s) reviewed: Outpatient Record: [facility] ED visits earlier today, 3/2/24, 2/29/2024, 2/24/2024, 2/21/2024    Work Up:  Chart documentation includes differential considered and any EKGs or imaging independently interpreted by provider, where specified.  In additional to work up  documented, I considered the following work up: Documented in chart, if applicable.    External consultation:  Discussion of management with another provider: Documented in chart, if applicable    Complicating factors:  Care impacted by chronic illness: Mental Health; chronic pain/vomiting   Care affected by social determinants of health: N/A    Disposition considerations: Discharge. I recommended the patient continue their current prescription strength medication(s): zofran, protonix, pepcid. See documentation for any additional details.      ED COURSE:  8:03 PM I met and introduced myself to the patient. I gathered initial history and performed my physical exam. We discussed options for workup. Patient would like to go home. We discussed discharge, follow up, and reasons to return to the ED.     At the conclusion of the encounter I discussed the results of all the tests and the disposition. The questions were answered. The patient or family acknowledged understanding and was agreeable with the care plan.    FINAL IMPRESSION:    ICD-10-CM    1. Nausea and vomiting  R11.2 Adult GI  Referral - Consult Only            MEDICATIONS GIVEN IN THE EMERGENCY DEPARTMENT:  Medications - No data to display      NEW PRESCRIPTIONS STARTED AT TODAY'S ED VISIT:  Discharge Medication List as of 3/4/2024  8:15 PM            HPI   Patient information was obtained from: Patient    Use of Intrepreter: N/A     Sadaf Ross is a 23 year old female with a pertinent history of anxiety, bipolar 1 disorder, depression and borderline personality disorder who presents to the ED by EMS for evaluation of nausea and vomiting.     Per chart review, the patient was seen in the [facility] ED on 3/2/24 for evaluation of nausea and vomiting. WBC was 14,000, hemoglobin was 12. Metabolic profile without significant abnormality and lipase was non elevated. Patient received IV fluids. Discharged to continue previously prescribed Protonix  Pepcid and Zofran.     Per chart review, the patient has been seen about twenty times in the ED in the past month.    Per the patient, she has been awake since 2 AM this morning with three episodes of vomiting. She also endorses nausea abdominal pain which she states is no different from the abdominal pain that she has had for the past few years. She notes that she has been able to drink tea today and denies blood in stool and vomit. Patient also notes that she has felt more tired, unwell and irritable since her father passed away on 2/17/2021.     At home, she takes Pepto bismol for nausea. She has also been taking antacid medications for the past couple of days without relief. Patient has received referrals to see a GI specialist in the past but has not scheduled an appointment. No other complaints at this time.    REVIEW OF SYSTEMS:  Pertinent positive and negative symptoms per HPI.       Medical History     Past Medical History:   Diagnosis Date    ADHD (attention deficit hyperactivity disorder)     Bipolar 1 disorder (H)     Borderline personality disorder (H)     Depression     Depressive disorder     Intellectual disability     Obesity     Syncope        Past Surgical History:   Procedure Laterality Date    APPENDECTOMY      APPENDECTOMY         Family History   Problem Relation Age of Onset    Diabetes Type 1 Father     Cancer Paternal Grandfather        Social History     Tobacco Use    Smoking status: Some Days     Packs/day: 0.25     Years: 5.00     Additional pack years: 0.00     Total pack years: 1.25     Types: Cigarettes    Smokeless tobacco: Never   Substance Use Topics    Alcohol use: No    Drug use: No       acetaminophen (TYLENOL) 325 MG tablet  albuterol (PROAIR HFA/PROVENTIL HFA/VENTOLIN HFA) 108 (90 Base) MCG/ACT inhaler  ARIPiprazole lauroxil ER (ARISTADA) 882 MG/3.2ML intra-muscular  bisacodyl (DULCOLAX) 5 MG EC tablet  calcium carbonate (TUMS) 500 MG chewable tablet  cyclobenzaprine  "(FLEXERIL) 10 MG tablet  dicyclomine (BENTYL) 20 MG tablet  docusate sodium (COLACE) 50 MG capsule  famotidine (PEPCID) 20 MG tablet  FLUoxetine (PROZAC) 40 MG capsule  hydrocortisone, Perianal, (HYDROCORTISONE) 2.5 % cream  hydrOXYzine (ATARAX) 50 MG tablet  ibuprofen (ADVIL/MOTRIN) 200 MG tablet  loperamide (IMODIUM A-D) 2 MG tablet  LORazepam (ATIVAN) 0.5 MG tablet  mupirocin (BACTROBAN) 2 % external ointment  OLANZapine zydis (ZYPREXA) 5 MG ODT  ondansetron (ZOFRAN) 4 MG tablet  ondansetron (ZOFRAN) 4 MG tablet  pantoprazole (PROTONIX) 40 MG EC tablet  polyethylene glycol (MIRALAX) 17 GM/Dose powder  promethazine (PHENERGAN) 12.5 MG tablet  sennosides (SENOKOT) 8.6 MG tablet  traZODone (DESYREL) 50 MG tablet  Vitamin D, Cholecalciferol, 25 MCG (1000 UT) TABS          Physical Exam     First Vitals:  Patient Vitals for the past 24 hrs:   BP Temp Temp src Pulse Resp SpO2 Height Weight   03/04/24 1828 (!) 150/94 98.8  F (37.1  C) Oral 98 16 100 % 1.6 m (5' 3\") 115.2 kg (254 lb)         PHYSICAL EXAM:   Physical Exam  Vitals and nursing note reviewed.   Constitutional:       General: She is not in acute distress.     Appearance: She is not ill-appearing, toxic-appearing or diaphoretic.   HENT:      Mouth/Throat:      Mouth: Mucous membranes are moist.   Eyes:      Conjunctiva/sclera: Conjunctivae normal.   Cardiovascular:      Rate and Rhythm: Normal rate and regular rhythm.      Heart sounds: Normal heart sounds.   Pulmonary:      Effort: Pulmonary effort is normal.      Breath sounds: Normal breath sounds.   Abdominal:      General: Abdomen is flat. Bowel sounds are normal. There is no distension.      Palpations: Abdomen is soft.      Tenderness: There is no abdominal tenderness. There is no right CVA tenderness, left CVA tenderness, guarding or rebound.   Skin:     General: Skin is warm and dry.   Neurological:      Mental Status: She is alert.             Results     LAB:  All pertinent labs reviewed and " interpreted  Labs Ordered and Resulted from Time of ED Arrival to Time of ED Departure - No data to display    RADIOLOGY:  No orders to display       I, Jaden Scott, am serving as a scribe to document services personally performed by Arianna Lamar PA-C, based on my observation and the provider's statements to me. I, Arianna Lamar PA-C attest that Jaden Scott is acting in a scribe capacity, has observed my performance of the services and has documented them in accordance with my direction.       Arianna Lamar PA-C   Emergency Medicine   [facility] EMERGENCY DEPARTMENT       Arianna Lamar PA-C  03/04/24 [id number]

## 2024-03-06 ENCOUNTER — HOSPITAL ENCOUNTER (EMERGENCY)
Facility: HOSPITAL | Age: 24
Discharge: HOME | End: 2024-03-06
Attending: FAMILY MEDICINE | Admitting: FAMILY MEDICINE
Payer: MEDICARE

## 2024-03-06 VITALS
BODY MASS INDEX: 45.17 KG/M2 | HEART RATE: 75 BPM | OXYGEN SATURATION: 99 % | RESPIRATION RATE: 19 BRPM | SYSTOLIC BLOOD PRESSURE: 126 MMHG | TEMPERATURE: 97 F | WEIGHT: 255 LBS | DIASTOLIC BLOOD PRESSURE: 60 MMHG

## 2024-03-06 DIAGNOSIS — R42 LIGHTHEADEDNESS: ICD-10-CM

## 2024-03-06 DIAGNOSIS — F60.3 BORDERLINE PERSONALITY DISORDER (H): Chronic | ICD-10-CM

## 2024-03-06 DIAGNOSIS — R11.0 NAUSEA: ICD-10-CM

## 2024-03-06 DIAGNOSIS — R10.13 EPIGASTRIC PAIN: ICD-10-CM

## 2024-03-06 LAB
ALBUMIN SERPL BCG-MCNC: 4.2 G/DL (ref 3.5–5.2)
ALP SERPL-CCNC: 66 U/L (ref 40–150)
ALT SERPL W P-5'-P-CCNC: 17 U/L (ref 0–50)
ANION GAP SERPL CALCULATED.3IONS-SCNC: 9 MMOL/L (ref 7–15)
AST SERPL W P-5'-P-CCNC: 14 U/L (ref 0–45)
BASOPHILS # BLD AUTO: 0 10E3/UL (ref 0–0.2)
BASOPHILS NFR BLD AUTO: 0 %
BILIRUB DIRECT SERPL-MCNC: <0.2 MG/DL (ref 0–0.3)
BILIRUB SERPL-MCNC: 0.2 MG/DL
BUN SERPL-MCNC: 9.5 MG/DL (ref 6–20)
CALCIUM SERPL-MCNC: 9.1 MG/DL (ref 8.6–10)
CHLORIDE SERPL-SCNC: 107 MMOL/L (ref 98–107)
CREAT SERPL-MCNC: 0.93 MG/DL (ref 0.51–0.95)
DEPRECATED HCO3 PLAS-SCNC: 26 MMOL/L (ref 22–29)
EGFRCR SERPLBLD CKD-EPI 2021: 88 ML/MIN/1.73M2
EOSINOPHIL # BLD AUTO: 0.1 10E3/UL (ref 0–0.7)
EOSINOPHIL NFR BLD AUTO: 1 %
ERYTHROCYTE [DISTWIDTH] IN BLOOD BY AUTOMATED COUNT: 13.3 % (ref 10–15)
GLUCOSE SERPL-MCNC: 89 MG/DL (ref 70–99)
HCT VFR BLD AUTO: 35.5 % (ref 35–47)
HGB BLD-MCNC: 11.7 G/DL (ref 11.7–15.7)
HOLD SPECIMEN: NORMAL
IMM GRANULOCYTES # BLD: 0 10E3/UL
IMM GRANULOCYTES NFR BLD: 0 %
LIPASE SERPL-CCNC: 31 U/L (ref 13–60)
LYMPHOCYTES # BLD AUTO: 2.7 10E3/UL (ref 0.8–5.3)
LYMPHOCYTES NFR BLD AUTO: 30 %
MAGNESIUM SERPL-MCNC: 2.2 MG/DL (ref 1.7–2.3)
MCH RBC QN AUTO: 27.5 PG (ref 26.5–33)
MCHC RBC AUTO-ENTMCNC: 33 G/DL (ref 31.5–36.5)
MCV RBC AUTO: 84 FL (ref 78–100)
MONOCYTES # BLD AUTO: 0.6 10E3/UL (ref 0–1.3)
MONOCYTES NFR BLD AUTO: 6 %
NEUTROPHILS # BLD AUTO: 5.6 10E3/UL (ref 1.6–8.3)
NEUTROPHILS NFR BLD AUTO: 63 %
NRBC # BLD AUTO: 0 10E3/UL
NRBC BLD AUTO-RTO: 0 /100
PLATELET # BLD AUTO: 236 10E3/UL (ref 150–450)
POTASSIUM SERPL-SCNC: 3.8 MMOL/L (ref 3.4–5.3)
PROT SERPL-MCNC: 7.1 G/DL (ref 6.4–8.3)
RBC # BLD AUTO: 4.25 10E6/UL (ref 3.8–5.2)
SODIUM SERPL-SCNC: 142 MMOL/L (ref 135–145)
TROPONIN T SERPL HS-MCNC: <6 NG/L
WBC # BLD AUTO: 9 10E3/UL (ref 4–11)

## 2024-03-06 PROCEDURE — 93005 ELECTROCARDIOGRAM TRACING: CPT | Performed by: STUDENT IN AN ORGANIZED HEALTH CARE EDUCATION/TRAINING PROGRAM

## 2024-03-06 PROCEDURE — 80048 BASIC METABOLIC PNL TOTAL CA: CPT | Performed by: FAMILY MEDICINE

## 2024-03-06 PROCEDURE — 96375 TX/PRO/DX INJ NEW DRUG ADDON: CPT

## 2024-03-06 PROCEDURE — 82248 BILIRUBIN DIRECT: CPT | Performed by: FAMILY MEDICINE

## 2024-03-06 PROCEDURE — 83690 ASSAY OF LIPASE: CPT | Performed by: FAMILY MEDICINE

## 2024-03-06 PROCEDURE — 258N000003 HC RX IP 258 OP 636: Performed by: FAMILY MEDICINE

## 2024-03-06 PROCEDURE — 96361 HYDRATE IV INFUSION ADD-ON: CPT

## 2024-03-06 PROCEDURE — 85025 COMPLETE CBC W/AUTO DIFF WBC: CPT | Performed by: FAMILY MEDICINE

## 2024-03-06 PROCEDURE — 250N000011 HC RX IP 250 OP 636: Performed by: FAMILY MEDICINE

## 2024-03-06 PROCEDURE — 93005 ELECTROCARDIOGRAM TRACING: CPT | Performed by: FAMILY MEDICINE

## 2024-03-06 PROCEDURE — 84484 ASSAY OF TROPONIN QUANT: CPT | Performed by: FAMILY MEDICINE

## 2024-03-06 PROCEDURE — 83735 ASSAY OF MAGNESIUM: CPT | Performed by: FAMILY MEDICINE

## 2024-03-06 PROCEDURE — 96374 THER/PROPH/DIAG INJ IV PUSH: CPT

## 2024-03-06 PROCEDURE — 36415 COLL VENOUS BLD VENIPUNCTURE: CPT | Performed by: FAMILY MEDICINE

## 2024-03-06 PROCEDURE — 99284 EMERGENCY DEPT VISIT MOD MDM: CPT | Mod: 25

## 2024-03-06 RX ORDER — ONDANSETRON 2 MG/ML
4 INJECTION INTRAMUSCULAR; INTRAVENOUS ONCE
Status: COMPLETED | OUTPATIENT
Start: 2024-03-06 | End: 2024-03-06

## 2024-03-06 RX ORDER — ONDANSETRON 4 MG/1
4 TABLET, ORALLY DISINTEGRATING ORAL EVERY 6 HOURS PRN
Qty: 20 TABLET | Refills: 0 | Status: SHIPPED | OUTPATIENT
Start: 2024-03-06 | End: 2024-03-09

## 2024-03-06 RX ORDER — SUCRALFATE 1 G/1
1 TABLET ORAL 2 TIMES DAILY
Qty: 14 TABLET | Refills: 0 | Status: SHIPPED | OUTPATIENT
Start: 2024-03-06 | End: 2024-03-13

## 2024-03-06 RX ADMIN — FAMOTIDINE 20 MG: 10 INJECTION, SOLUTION INTRAVENOUS at 17:57

## 2024-03-06 RX ADMIN — ONDANSETRON 4 MG: 2 INJECTION INTRAMUSCULAR; INTRAVENOUS at 17:55

## 2024-03-06 RX ADMIN — SODIUM CHLORIDE 1000 ML: 9 INJECTION, SOLUTION INTRAVENOUS at 17:54

## 2024-03-06 ASSESSMENT — ACTIVITIES OF DAILY LIVING (ADL)
ADLS_ACUITY_SCORE: 39
ADLS_ACUITY_SCORE: 39

## 2024-03-06 NOTE — ED TRIAGE NOTES
Pt reports was woken from sleep with CP and SOB, pt also reports dizziness and headache that has been ongoing.  Pt BIBA IV in L ac.       Triage Assessment (Adult)       Row Name 03/06/24 9187          Triage Assessment    Airway WDL WDL        Respiratory WDL    Respiratory WDL WDL        Skin Circulation/Temperature WDL    Skin Circulation/Temperature WDL WDL        Cardiac WDL    Cardiac WDL X;chest pain        Peripheral/Neurovascular WDL    Peripheral Neurovascular WDL WDL        Cognitive/Neuro/Behavioral WDL    Cognitive/Neuro/Behavioral WDL WDL

## 2024-03-06 NOTE — ED PROVIDER NOTES
EMERGENCY DEPARTMENT ENCOUNTER      NAME: Sadaf Ross  AGE: 23 year old female  YOB: 2000  MRN: [id number]  EVALUATION DATE & TIME: 3/6/2024  5:22 PM    PCP: [facility]    ED PROVIDER: Matt Solorzano M.D.    Chief Complaint   Patient presents with    Chest Pain    Headache    Dizziness       FINAL IMPRESSION:  1. Nausea    2. Lightheadedness    3. Epigastric pain    4. Borderline personality disorder (H)        ED COURSE & MEDICAL DECISION MAKING:    Pertinent Labs & Imaging studies independently interpreted by me. (See chart for details)  5:37 PM  Patient seen and examined, reviewed multiple prior emergency department visits this week which have been for anxiety, nausea and vomiting.  Patient was seen in the emergency department yesterday shortly after being triaged.  For me she says her nausea started today when she woke up, also complains of some upper abdominal and chest pain.  She says she was vomiting a couple days ago but has not vomited since then.  Denies diarrhea, fever, chills.  Does states she has a little shortness of breath, also reports shortness of breath.  On exam here, patient is vitally stable, appears a little uncomfortable.  Epigastric tenderness and some chest wall tenderness as well.  Initial EKG is reassuring, labs are ordered along with Zofran and fluids.  6:19 PM labs ordered and independently interpreted by me with negative troponin, normal CBC, normal hepatic panel, normal lipase, normal basic metabolic panel.  Also reviewed patient's care plan.  Patient will be given fluids and is stable for discharge with outpatient follow-up.  I did recheck her and discuss her findings and plan, patient denies suicide ideation.    At the conclusion of the encounter I discussed the results of all of the tests and the disposition. The questions were answered. The patient or family acknowledged understanding and was agreeable with the care plan.     Medical Decision  Making  Obtained supplemental history:Supplemental history obtained?: No  Reviewed external records: External records reviewed?: Documented in chart  Care impacted by chronic illness:Mental Health and Smoking / Nicotine Use  Care significantly affected by social determinants of health:Access to Affordable Health Care  Did you consider but not order tests?: Work up considered but not performed and documented in chart, if applicable  Did you interpret images independently?: Independent interpretation of ECG and images noted in documentation, when applicable.  Consultation discussion with other provider:Did you involve another provider (consultant, , pharmacy, etc.)?: No  Discharge. I prescribed additional prescription strength medication(s) as charted. I considered admission, but ultimately discharged patient per care plan and back to group home monitored setting.    EKG:    Performed at: 5:10 PM  Impression: Normal EKG  Rate: 86  Rhythm: Sinus  Axis: Normal  VT Interval: 176  QRS Interval: 86  QTc Interval: 437  ST Changes: No acute ischemic changes  Comparison: February 12, no acute changes    I have independently reviewed and interpreted the EKG(s) documented above.    PROCEDURES:       MEDICATIONS GIVEN IN THE EMERGENCY:  Medications   sodium chloride 0.9% BOLUS 1,000 mL (1,000 mLs Intravenous $New Bag 3/6/24 1754)   ondansetron (ZOFRAN) injection 4 mg (4 mg Intravenous $Given 3/6/24 1755)   famotidine (PEPCID) injection 20 mg (20 mg Intravenous $Given 3/6/24 1757)       NEW PRESCRIPTIONS STARTED AT TODAY'S ER VISIT  New Prescriptions    ONDANSETRON (ZOFRAN ODT) 4 MG ODT TAB    Take 1 tablet (4 mg) by mouth every 6 hours as needed for nausea    SUCRALFATE (CARAFATE) 1 GM TABLET    Take 1 tablet (1 g) by mouth 2 times daily for 7 days       =================================================================    HPI    Patient information was obtained from: Patient      Sadaf Ross is a 23 year old female with  a pertinent history of personality disorder who presents to this ED for evaluation of nausea and vomiting.  Patient reports she woke up earlier today with nausea and lightheadedness, also some shortness of breath.  Says she has some pain in the middle of her chest which she describes as burning, also some upper abdominal pain.  Says she has vomited a couple days ago.  Denies diarrhea, fevers.  Does states she has some lightheadedness and chills but did not pass out has not fallen, no recorded fever      REVIEW OF SYSTEMS   Review of Systems   All other systems reviewed and negative    PAST MEDICAL HISTORY:  Past Medical History:   Diagnosis Date    ADHD (attention deficit hyperactivity disorder)     Bipolar 1 disorder (H)     Borderline personality disorder (H)     Depression     Depressive disorder     Intellectual disability     Obesity     Syncope        PAST SURGICAL HISTORY:  Past Surgical History:   Procedure Laterality Date    APPENDECTOMY      APPENDECTOMY         CURRENT MEDICATIONS:    Current Facility-Administered Medications   Medication    sodium chloride 0.9% BOLUS 1,000 mL     Current Outpatient Medications   Medication    ondansetron (ZOFRAN ODT) 4 MG ODT tab    sucralfate (CARAFATE) 1 GM tablet    acetaminophen (TYLENOL) 325 MG tablet    albuterol (PROAIR HFA/PROVENTIL HFA/VENTOLIN HFA) 108 (90 Base) MCG/ACT inhaler    ARIPiprazole lauroxil ER (ARISTADA) 882 MG/3.2ML intra-muscular    bisacodyl (DULCOLAX) 5 MG EC tablet    calcium carbonate (TUMS) 500 MG chewable tablet    cyclobenzaprine (FLEXERIL) 10 MG tablet    dicyclomine (BENTYL) 20 MG tablet    docusate sodium (COLACE) 50 MG capsule    famotidine (PEPCID) 20 MG tablet    FLUoxetine (PROZAC) 40 MG capsule    hydrocortisone, Perianal, (HYDROCORTISONE) 2.5 % cream    hydrOXYzine (ATARAX) 50 MG tablet    ibuprofen (ADVIL/MOTRIN) 200 MG tablet    loperamide (IMODIUM A-D) 2 MG tablet    LORazepam (ATIVAN) 0.5 MG tablet    mupirocin (BACTROBAN) 2 %  external ointment    OLANZapine zydis (ZYPREXA) 5 MG ODT    ondansetron (ZOFRAN) 4 MG tablet    ondansetron (ZOFRAN) 4 MG tablet    pantoprazole (PROTONIX) 40 MG EC tablet    polyethylene glycol (MIRALAX) 17 GM/Dose powder    promethazine (PHENERGAN) 12.5 MG tablet    sennosides (SENOKOT) 8.6 MG tablet    traZODone (DESYREL) 50 MG tablet    Vitamin D, Cholecalciferol, 25 MCG (1000 UT) TABS       ALLERGIES:  Allergies   Allergen Reactions    Penicillins Rash and Unknown       FAMILY HISTORY:  Family History   Problem Relation Age of Onset    Diabetes Type 1 Father     Cancer Paternal Grandfather        SOCIAL HISTORY:   Social History     Socioeconomic History    Marital status: Single   Tobacco Use    Smoking status: Some Days     Packs/day: 0.25     Years: 5.00     Additional pack years: 0.00     Total pack years: 1.25     Types: Cigarettes    Smokeless tobacco: Never   Substance and Sexual Activity    Alcohol use: No    Drug use: No    Sexual activity: Not Currently     Partners: Female, Male     Birth control/protection: Pill, Injection       VITALS:  /85   Pulse 96   Temp 97  F (36.1  C) (Temporal)   Resp 20   Wt 115.7 kg (255 lb)   LMP  (LMP Unknown)   SpO2 100%   BMI 45.17 kg/m      PHYSICAL EXAM:  Physical Exam  Vitals and nursing note reviewed.   Constitutional:       Appearance: Normal appearance.   HENT:      Head: Normocephalic and atraumatic.      Right Ear: External ear normal.      Left Ear: External ear normal.      Nose: Nose normal.      Mouth/Throat:      Mouth: Mucous membranes are moist.   Eyes:      Extraocular Movements: Extraocular movements intact.      Conjunctiva/sclera: Conjunctivae normal.      Pupils: Pupils are equal, round, and reactive to light.   Cardiovascular:      Rate and Rhythm: Normal rate and regular rhythm.      Comments: Midline chest wall tenderness  Pulmonary:      Effort: Pulmonary effort is normal.      Breath sounds: Normal breath sounds. No wheezing or  rales.   Abdominal:      General: There is no distension.      Palpations: Abdomen is soft.      Tenderness: There is abdominal tenderness (epigastric). There is no guarding.   Musculoskeletal:         General: Normal range of motion.      Cervical back: Normal range of motion and neck supple.      Right lower leg: No edema.      Left lower leg: No edema.   Lymphadenopathy:      Cervical: No cervical adenopathy.   Skin:     General: Skin is warm and dry.   Neurological:      General: No focal deficit present.      Mental Status: She is alert and oriented to person, place, and time. Mental status is at baseline.      Comments: No gross focal neurologic deficits   Psychiatric:         Mood and Affect: Mood normal.         Behavior: Behavior normal.         Thought Content: Thought content normal.          LAB:  All pertinent labs reviewed and interpreted.  Results for orders placed or performed during the hospital encounter of 03/06/24   Basic metabolic panel   Result Value Ref Range    Sodium 142 135 - 145 mmol/L    Potassium 3.8 3.4 - 5.3 mmol/L    Chloride 107 98 - 107 mmol/L    Carbon Dioxide (CO2) 26 22 - 29 mmol/L    Anion Gap 9 7 - 15 mmol/L    Urea Nitrogen 9.5 6.0 - 20.0 mg/dL    Creatinine 0.93 0.51 - 0.95 mg/dL    GFR Estimate 88 >60 mL/min/1.73m2    Calcium 9.1 8.6 - 10.0 mg/dL    Glucose 89 70 - 99 mg/dL   Result Value Ref Range    Troponin T, High Sensitivity <6 <=14 ng/L   CBC with platelets and differential   Result Value Ref Range    WBC Count 9.0 4.0 - 11.0 10e3/uL    RBC Count 4.25 3.80 - 5.20 10e6/uL    Hemoglobin 11.7 11.7 - 15.7 g/dL    Hematocrit 35.5 35.0 - 47.0 %    MCV 84 78 - 100 fL    MCH 27.5 26.5 - 33.0 pg    MCHC 33.0 31.5 - 36.5 g/dL    RDW 13.3 10.0 - 15.0 %    Platelet Count 236 150 - 450 10e3/uL    % Neutrophils 63 %    % Lymphocytes 30 %    % Monocytes 6 %    % Eosinophils 1 %    % Basophils 0 %    % Immature Granulocytes 0 %    NRBCs per 100 WBC 0 <1 /100    Absolute Neutrophils  5.6 1.6 - 8.3 10e3/uL    Absolute Lymphocytes 2.7 0.8 - 5.3 10e3/uL    Absolute Monocytes 0.6 0.0 - 1.3 10e3/uL    Absolute Eosinophils 0.1 0.0 - 0.7 10e3/uL    Absolute Basophils 0.0 0.0 - 0.2 10e3/uL    Absolute Immature Granulocytes 0.0 <=0.4 10e3/uL    Absolute NRBCs 0.0 10e3/uL   Hepatic function panel   Result Value Ref Range    Protein Total 7.1 6.4 - 8.3 g/dL    Albumin 4.2 3.5 - 5.2 g/dL    Bilirubin Total 0.2 <=1.2 mg/dL    Alkaline Phosphatase 66 40 - 150 U/L    AST 14 0 - 45 U/L    ALT 17 0 - 50 U/L    Bilirubin Direct <0.20 0.00 - 0.30 mg/dL   Result Value Ref Range    Lipase 31 13 - 60 U/L   Result Value Ref Range    Magnesium 2.2 1.7 - 2.3 mg/dL       RADIOLOGY:  Reviewed all pertinent imaging. Please see official radiology report.  No orders to display       Matt Solorzano M.D.  Emergency Medicine  [facility] EMERGENCY DEPARTMENT  [address] [address] [phone]  Dept: [phone]       Matt Solorzano MD  03/06/24 3104

## 2024-03-07 NOTE — ED NOTES
Patient went back to [facility] home via [facility] wheelchair transport.

## 2024-03-08 NOTE — ED PROVIDER NOTES
----- Message from Rachel Clayton MA sent at 3/6/2024  4:13 PM CST -----  Regarding: cscope order  Hello, Can we enter colonoscopy order for 4/20 w/ Dr. BACON  Thank you!     ED Provider Note  [facility]      History     Chief Complaint   Patient presents with     Suicidal     Homicidal     Pt feeling suicidal and homicidal, wanting to hurt self and others (people around her, staff from group home and at the ED). . She plans to continue to scratch herself. Pt wants to get zyprexia. Per Ems, pt shared has memories that brought into her mind (death of dad, couple years ago).      HPI  Sadaf Ross is a 22 year old female who is well-known to the emergency room arriving now today stating that she is having both suicidal and homicidal thoughts wanting to harm others in the group home.  Patient has chronic episodes of increased suicidal and/or homicidal thoughts she has been seen in the emergency room several times this month she continues to have some escalation of her behaviors however on arrival here in the emergency room patient requested Zyprexa and received Zyprexa with marked improvement of her behaviors I discussed the possibility of adding a nighttime dose of Zyprexa to her current medication regiment and patient is agreeable to this and is agreeable to going back to the group home and can maintain safety.    Past Medical History  Past Medical History:   Diagnosis Date     ADHD (attention deficit hyperactivity disorder)      Bipolar 1 disorder (H)      Borderline personality disorder (H)      Depression      Depressive disorder      Intellectual disability      Obesity      Syncope      Past Surgical History:   Procedure Laterality Date     APPENDECTOMY       APPENDECTOMY       OLANZapine zydis (ZYPREXA) 5 MG ODT  acetaminophen (TYLENOL) 325 MG tablet  alum & mag hydroxide-simethicone (MAALOX  ES) 400-400-40 MG/5ML SUSP suspension  ARIPiprazole lauroxil ER (ARISTADA) 882 MG/3.2ML intra-muscular  benztropine (COGENTIN) 1 MG tablet  calcium carbonate (TUMS) 500 MG chewable tablet  clobetasol (TEMOVATE) 0.05 % CREA cream  cyclobenzaprine (FLEXERIL) 10 MG  tablet  diclofenac (VOLTAREN) 1 % topical gel  docusate sodium (COLACE) 50 MG capsule  fluticasone (FLONASE) 50 MCG/ACT nasal spray  guaiFENesin (ROBITUSSIN) 100 MG/5ML SYRP  hydrocortisone (CORTAID) 0.5 % external cream  hydrOXYzine (ATARAX) 50 MG tablet  hyoscyamine (LEVSIN/SL) 0.125 MG sublingual tablet  ibuprofen (ADVIL/MOTRIN) 400 MG tablet  loperamide (IMODIUM) 2 MG capsule  LORazepam (ATIVAN) 0.5 MG tablet  multivitamin, therapeutic (THERA-VIT) TABS tablet  omeprazole (PRILOSEC) 40 MG DR capsule  ondansetron (ZOFRAN) 4 MG tablet  polyethylene glycol (MIRALAX) 17 GM/Dose powder  prochlorperazine (COMPAZINE) 10 MG tablet  promethazine (PHENERGAN) 12.5 MG tablet  sennosides (SENOKOT) 8.6 MG tablet  traZODone (DESYREL) 50 MG tablet  venlafaxine (EFFEXOR-ER) 225 MG 24 hr tablet  Vitamin D, Cholecalciferol, 25 MCG (1000 UT) TABS      Allergies   Allergen Reactions     Penicillins Rash and Unknown     Family History  Family History   Problem Relation Age of Onset     Diabetes Type 1 Father      Cancer Paternal Grandfather      Social History   Social History     Tobacco Use     Smoking status: Every Day     Packs/day: 1.00     Years: 5.00     Pack years: 5.00     Types: Vaping Device, Cigarettes     Smokeless tobacco: Never   Substance Use Topics     Alcohol use: No     Drug use: No      Past medical history, past surgical history, medications, allergies, family history, and social history were reviewed with the patient. No additional pertinent items.       Review of Systems  A complete review of systems was performed with pertinent positives and negatives noted in the HPI, and all other systems negative.    Physical Exam   BP: 134/82  Pulse: 74  Temp: 98.1  F (36.7  C)  Resp: 16  SpO2: 100 %  Physical Exam    ED Course      Procedures    The medical record was reviewed and interpreted.  Medications   OLANZapine zydis (zyPREXA) ODT tab 10 mg (10 mg Oral Given 12/23/22 1822)        Assessments & Plan (with Medical  Decision Making)       I have reviewed the nursing notes. I have reviewed the findings, diagnosis, plan and need for follow up with the patient.    Discharge Medication List as of 12/23/2022  7:15 PM      START taking these medications    Details   OLANZapine zydis (ZYPREXA) 5 MG ODT Take 1 tablet (5 mg) by mouth At Bedtime, Disp-30 tablet, R-0, InstyMeds           Patient with intellectual disability borderline personality disorder history of bipolar affective disorder at this time markedly improved behaviors with Zyprexa she is willing and able to be returned back to her group home feels as though she is improved and is agreeable to starting Zyprexa nighttime dose 5 mg at bedtime.  Patient will continue with her outpatient cares as previously scheduled.        Final diagnoses:   Intellectual disability   Borderline personality disorder (H)   Bipolar affective disorder, currently depressed, moderate (H)       --    [facility] EMERGENCY DEPARTMENT  12/23/2022     Robert Olea MD  12/23/22 6980

## 2024-03-11 ENCOUNTER — HOSPITAL ENCOUNTER (EMERGENCY)
Facility: HOSPITAL | Age: 24
Discharge: HOME | End: 2024-03-11
Attending: EMERGENCY MEDICINE | Admitting: EMERGENCY MEDICINE
Payer: MEDICARE

## 2024-03-11 VITALS
BODY MASS INDEX: 44.83 KG/M2 | HEART RATE: 74 BPM | OXYGEN SATURATION: 98 % | SYSTOLIC BLOOD PRESSURE: 122 MMHG | TEMPERATURE: 98.2 F | RESPIRATION RATE: 16 BRPM | DIASTOLIC BLOOD PRESSURE: 64 MMHG | HEIGHT: 63 IN | WEIGHT: 253 LBS

## 2024-03-11 DIAGNOSIS — R11.0 CHRONIC NAUSEA: ICD-10-CM

## 2024-03-11 LAB
ALBUMIN SERPL BCG-MCNC: 4.9 G/DL (ref 3.5–5.2)
ALBUMIN UR-MCNC: 30 MG/DL
ALP SERPL-CCNC: 72 U/L (ref 40–150)
ALT SERPL W P-5'-P-CCNC: 18 U/L (ref 0–50)
ANION GAP SERPL CALCULATED.3IONS-SCNC: 10 MMOL/L (ref 7–15)
APPEARANCE UR: ABNORMAL
AST SERPL W P-5'-P-CCNC: 15 U/L (ref 0–45)
BILIRUB DIRECT SERPL-MCNC: <0.2 MG/DL (ref 0–0.3)
BILIRUB SERPL-MCNC: 0.6 MG/DL
BILIRUB UR QL STRIP: NEGATIVE
BUN SERPL-MCNC: 13.1 MG/DL (ref 6–20)
CALCIUM SERPL-MCNC: 10.1 MG/DL (ref 8.6–10)
CHLORIDE SERPL-SCNC: 106 MMOL/L (ref 98–107)
COLOR UR AUTO: YELLOW
CREAT SERPL-MCNC: 0.88 MG/DL (ref 0.51–0.95)
DEPRECATED HCO3 PLAS-SCNC: 26 MMOL/L (ref 22–29)
EGFRCR SERPLBLD CKD-EPI 2021: >90 ML/MIN/1.73M2
ERYTHROCYTE [DISTWIDTH] IN BLOOD BY AUTOMATED COUNT: 13.2 % (ref 10–15)
GLUCOSE SERPL-MCNC: 107 MG/DL (ref 70–99)
GLUCOSE UR STRIP-MCNC: NEGATIVE MG/DL
HCG UR QL: NEGATIVE
HCT VFR BLD AUTO: 41.9 % (ref 35–47)
HGB BLD-MCNC: 13.8 G/DL (ref 11.7–15.7)
HGB UR QL STRIP: ABNORMAL
HOLD SPECIMEN: NORMAL
KETONES UR STRIP-MCNC: NEGATIVE MG/DL
LEUKOCYTE ESTERASE UR QL STRIP: ABNORMAL
MAGNESIUM SERPL-MCNC: 2.1 MG/DL (ref 1.7–2.3)
MCH RBC QN AUTO: 27.4 PG (ref 26.5–33)
MCHC RBC AUTO-ENTMCNC: 32.9 G/DL (ref 31.5–36.5)
MCV RBC AUTO: 83 FL (ref 78–100)
MUCOUS THREADS #/AREA URNS LPF: PRESENT /LPF
NITRATE UR QL: NEGATIVE
PH UR STRIP: 5.5 [PH] (ref 5–7)
PLATELET # BLD AUTO: 280 10E3/UL (ref 150–450)
POTASSIUM SERPL-SCNC: 4.4 MMOL/L (ref 3.4–5.3)
PROT SERPL-MCNC: 8.7 G/DL (ref 6.4–8.3)
RBC # BLD AUTO: 5.04 10E6/UL (ref 3.8–5.2)
RBC URINE: 1 /HPF
SODIUM SERPL-SCNC: 142 MMOL/L (ref 135–145)
SP GR UR STRIP: 1.03 (ref 1–1.03)
SQUAMOUS EPITHELIAL: 14 /HPF
UROBILINOGEN UR STRIP-MCNC: <2 MG/DL
WBC # BLD AUTO: 7.9 10E3/UL (ref 4–11)
WBC URINE: 5 /HPF

## 2024-03-11 PROCEDURE — 99284 EMERGENCY DEPT VISIT MOD MDM: CPT | Mod: 25

## 2024-03-11 PROCEDURE — 85027 COMPLETE CBC AUTOMATED: CPT | Performed by: STUDENT IN AN ORGANIZED HEALTH CARE EDUCATION/TRAINING PROGRAM

## 2024-03-11 PROCEDURE — 96374 THER/PROPH/DIAG INJ IV PUSH: CPT

## 2024-03-11 PROCEDURE — 82248 BILIRUBIN DIRECT: CPT | Performed by: EMERGENCY MEDICINE

## 2024-03-11 PROCEDURE — 80053 COMPREHEN METABOLIC PANEL: CPT | Performed by: EMERGENCY MEDICINE

## 2024-03-11 PROCEDURE — 83735 ASSAY OF MAGNESIUM: CPT | Performed by: STUDENT IN AN ORGANIZED HEALTH CARE EDUCATION/TRAINING PROGRAM

## 2024-03-11 PROCEDURE — 83735 ASSAY OF MAGNESIUM: CPT | Performed by: EMERGENCY MEDICINE

## 2024-03-11 PROCEDURE — 250N000011 HC RX IP 250 OP 636

## 2024-03-11 PROCEDURE — 258N000003 HC RX IP 258 OP 636

## 2024-03-11 PROCEDURE — 250N000013 HC RX MED GY IP 250 OP 250 PS 637

## 2024-03-11 PROCEDURE — 81001 URINALYSIS AUTO W/SCOPE: CPT

## 2024-03-11 PROCEDURE — 81025 URINE PREGNANCY TEST: CPT

## 2024-03-11 PROCEDURE — 96361 HYDRATE IV INFUSION ADD-ON: CPT

## 2024-03-11 PROCEDURE — 80053 COMPREHEN METABOLIC PANEL: CPT | Performed by: STUDENT IN AN ORGANIZED HEALTH CARE EDUCATION/TRAINING PROGRAM

## 2024-03-11 PROCEDURE — 82248 BILIRUBIN DIRECT: CPT | Performed by: STUDENT IN AN ORGANIZED HEALTH CARE EDUCATION/TRAINING PROGRAM

## 2024-03-11 PROCEDURE — 36415 COLL VENOUS BLD VENIPUNCTURE: CPT | Performed by: STUDENT IN AN ORGANIZED HEALTH CARE EDUCATION/TRAINING PROGRAM

## 2024-03-11 PROCEDURE — 85027 COMPLETE CBC AUTOMATED: CPT | Performed by: EMERGENCY MEDICINE

## 2024-03-11 RX ORDER — MAGNESIUM HYDROXIDE/ALUMINUM HYDROXICE/SIMETHICONE 120; 1200; 1200 MG/30ML; MG/30ML; MG/30ML
15 SUSPENSION ORAL ONCE
Status: COMPLETED | OUTPATIENT
Start: 2024-03-11 | End: 2024-03-11

## 2024-03-11 RX ORDER — ONDANSETRON 2 MG/ML
4 INJECTION INTRAMUSCULAR; INTRAVENOUS ONCE
Status: COMPLETED | OUTPATIENT
Start: 2024-03-11 | End: 2024-03-11

## 2024-03-11 RX ADMIN — ALUMINUM HYDROXIDE, MAGNESIUM HYDROXIDE, AND SIMETHICONE 15 ML: 200; 200; 20 SUSPENSION ORAL at 12:03

## 2024-03-11 RX ADMIN — SODIUM CHLORIDE, POTASSIUM CHLORIDE, SODIUM LACTATE AND CALCIUM CHLORIDE 1000 ML: 600; 310; 30; 20 INJECTION, SOLUTION INTRAVENOUS at 11:06

## 2024-03-11 RX ADMIN — ONDANSETRON 4 MG: 2 INJECTION INTRAMUSCULAR; INTRAVENOUS at 11:06

## 2024-03-11 ASSESSMENT — ACTIVITIES OF DAILY LIVING (ADL): ADLS_ACUITY_SCORE: 37

## 2024-03-11 NOTE — ED PROVIDER NOTES
"Emergency Department Midlevel Supervisory Note     I had a face to face encounter with this patient seen by the Advanced Practice Provider (BETHANY). I personally made/approved the management plan and take responsibility for the patient management. I personally saw patient and performed a substantive portion of the visit including all aspects of the medical decision making.     ED Course:  12:10 PM Karley Foster PA-C staffed patient with me. I agree with their assessment and plan of management, and I will see the patient.  12:39 PM I met with the patient to introduce myself, gather additional history, perform my initial exam, and discuss the plan.  12:43 PM Patient will be discharged home.  On my evaluation, pt is feeling much better, asking to go home and actually calling for her ride.      Brief HPI:     Sadaf Ross is a 23 year old female who presents for evaluation of vomiting bile-appearing emesis the past few days with abdominal discomfort, but no diarrhea.    I, Rhonda Mcdonald, am serving as a scribe to document services personally performed by Dr. Pacheco Marcos, based on my observations and the provider's statements to me.  I, Pacheco Marcos, DO attest that Rhonda Mcdonald is acting in a scribe capacity, has observed my performance of the services and has documented them in accordance with my direction.    Brief Physical Exam: /64   Pulse 74   Temp 98.2  F (36.8  C) (Temporal)   Resp 16   Ht 1.6 m (5' 3\")   Wt 114.8 kg (253 lb)   LMP  (LMP Unknown)   SpO2 98%   BMI 44.82 kg/m    Constitutional:  Alert, in no acute distress  EYES: Conjunctivae clear  HENT:  Atraumatic  Respiratory:  Respirations even, unlabored, in no acute respiratory distress  Cardiovascular:  Regular rate and rhythm, good peripheral perfusion  GI: Soft, non-distended, non-tender, negative Santana's and McBurney's, no distension/rigidity  Musculoskeletal:  Moves all 4 extremities equally, grossly symmetrical " strength  Integument: Warm & dry. No appreciable rash, erythema.  Neurologic:  Alert & oriented, speech clear and fluent, no focal deficits noted  Psych: Normal mood and affect      MDM:  Pt seen in conjunction with BETHANY Karley Foster.  Pt here with recurrent episodes of n/v, nothing today.  Pt afebrile, benign abdomen, no other immediate concerns on vitals/exam.  Labs today all reassuring.  Do not feel CT or US imaging of abdomen warranted based on history and initial exam and workup.  Pt feeling much better, would like to go back home.  Agree with discharge, continued outpatient follow up advised.       1. Chronic nausea        Consults:  none    Labs and Imaging:  Results for orders placed or performed during the hospital encounter of 03/11/24   Extra Blue Top Tube   Result Value Ref Range    Hold Specimen JIC    Extra Red Top Tube   Result Value Ref Range    Hold Specimen JIC    Extra Green Top (Lithium Heparin) Tube   Result Value Ref Range    Hold Specimen JIC    Extra Purple Top Tube   Result Value Ref Range    Hold Specimen JIC    UA with Microscopic reflex to Culture    Specimen: Urine, Midstream   Result Value Ref Range    Color Urine Yellow Colorless, Straw, Light Yellow, Yellow    Appearance Urine Slightly Cloudy (A) Clear    Glucose Urine Negative Negative mg/dL    Bilirubin Urine Negative Negative    Ketones Urine Negative Negative mg/dL    Specific Gravity Urine 1.034 (H) 1.001 - 1.030    Blood Urine 0.03 mg/dL (A) Negative    pH Urine 5.5 5.0 - 7.0    Protein Albumin Urine 30 (A) Negative mg/dL    Urobilinogen Urine <2.0 <2.0 mg/dL    Nitrite Urine Negative Negative    Leukocyte Esterase Urine 25 Mindy/uL (A) Negative    Mucus Urine Present (A) None Seen /LPF    RBC Urine 1 <=2 /HPF    WBC Urine 5 <=5 /HPF    Squamous Epithelials Urine 14 (H) <=1 /HPF   HCG qualitative urine   Result Value Ref Range    hCG Urine Qualitative Negative Negative   Basic metabolic panel   Result Value Ref Range    Sodium  142 135 - 145 mmol/L    Potassium 4.4 3.4 - 5.3 mmol/L    Chloride 106 98 - 107 mmol/L    Carbon Dioxide (CO2) 26 22 - 29 mmol/L    Anion Gap 10 7 - 15 mmol/L    Urea Nitrogen 13.1 6.0 - 20.0 mg/dL    Creatinine 0.88 0.51 - 0.95 mg/dL    GFR Estimate >90 >60 mL/min/1.73m2    Calcium 10.1 (H) 8.6 - 10.0 mg/dL    Glucose 107 (H) 70 - 99 mg/dL   CBC with platelets   Result Value Ref Range    WBC Count 7.9 4.0 - 11.0 10e3/uL    RBC Count 5.04 3.80 - 5.20 10e6/uL    Hemoglobin 13.8 11.7 - 15.7 g/dL    Hematocrit 41.9 35.0 - 47.0 %    MCV 83 78 - 100 fL    MCH 27.4 26.5 - 33.0 pg    MCHC 32.9 31.5 - 36.5 g/dL    RDW 13.2 10.0 - 15.0 %    Platelet Count 280 150 - 450 10e3/uL   Hepatic function panel   Result Value Ref Range    Protein Total 8.7 (H) 6.4 - 8.3 g/dL    Albumin 4.9 3.5 - 5.2 g/dL    Bilirubin Total 0.6 <=1.2 mg/dL    Alkaline Phosphatase 72 40 - 150 U/L    AST 15 0 - 45 U/L    ALT 18 0 - 50 U/L    Bilirubin Direct <0.20 0.00 - 0.30 mg/dL   Result Value Ref Range    Magnesium 2.1 1.7 - 2.3 mg/dL       I have reviewed the relevant laboratory studies above.    I independently interpreted the following imaging study(s):   none    EKG: I reviewed and independently interpreted the patient's EKG, with comments made as listed below. Please see scanned EKG for full report.       Procedures:  I was present for the key portions of procedures documented in BETHANY/midlevel note, see midlevel note for further details.    Pacheco Marcos DO  [facility] EMERGENCY DEPARTMENT  [address] [place] [id number] [phone]      Pacheco Marcos MD  03/11/24 0985

## 2024-03-11 NOTE — ED NOTES
Pt up to restroom, reporting persistent nausea. After returning to room patient reclining and resting in chair. Provided emesis bag and apple juice for PO challenge.

## 2024-03-11 NOTE — ED TRIAGE NOTES
Patient has been vomiting for the past few days--not bloody--states is like bile--no diarrhea--some abdominal discomfort at times.

## 2024-03-11 NOTE — ED PROVIDER NOTES
"Emergency Department Encounter  [facility] EMERGENCY DEPARTMENT  Sadaf Ross   AGE: 23 year old SEX: female  YOB: 2000  MRN: [id number]      EVALUATION DATE & TIME: No admission date for patient encounter. ; PCP: [facility]   ED PROVIDER: Karley Foster PA-C    CHIEF COMPLAINT: Vomiting      MEDICAL DECISION MAKING   Sadaf Ross is a 23 year old female with a pertinent history of intellectual disability, suicidal ideation, borderline personality disorder, and bipolar affective disorder who presents to the ED for evaluation of vomiting.  Patient has greater than 30 emergency room visits since January 2024 for various complaints but mostly relating to GI complaints.  He has tried multiple different treatments.  There are no new or worsening symptoms today, patient states she continues to have chills, sweats, fatigue, and vomiting for \"a while.\"  No vomiting so far today but 2 episodes yesterday.  Patient has not taken her prescribed ondansetron.  History of appendectomy.  Not currently having abdominal pain.  Patient has an active care plan for chronic pain.    Vitals reviewed and hemodynamically stable, afebrile.  On exam, patient is well-appearing, in no acute distress, and nontoxic.  Abdominal exam is unremarkable without any rebound or guarding.  No CVA tenderness.  Negative Santana sign.  Oropharynx unremarkable.    Symptoms and work up most consistent with chronic abdominal pain.  ET imaging not indicated given symptoms have not worsened or changed.  Laboratory workup reassuring without leukocytosis, hemoglobin stable at 13.8, no thrombocytopenia.  BMP reassuring without emergent electrolyte abnormality or signs of dehydration induced DKA.  Lipase WNL.  Hepatic function panel without elevation/suggest hepatobiliary disease.  Based on evaluation today and history of workup today and in the last week, doubt SBO, surgical abdomen, viscus perforation, " vascular catastrophe, acute cholecystitis, or gastric or esophageal dysmotility disorders.    After treatment, the patient is feeling better and tolerating PO fluids.  Following patient's clear plan patient will be discharged home with close PCP follow-up. Patient instructed to present to ER with new or worsening symptoms such as persistent fevers, dehydration, uncontrolled vomiting    Medical Decision Making  Obtained supplemental history:Supplemental history obtained?: No  Reviewed external records: External records reviewed?: Documented in chart  Care impacted by chronic illness:Mental Health  Care significantly affected by social determinants of health:N/A and Medication Noncompliance  Did you consider but not order tests?: Work up considered but not performed and documented in chart, if applicable  Did you interpret images independently?: Independent interpretation of ECG and images noted in documentation, when applicable.  Consultation discussion with other provider:Did you involve another provider (consultant, , pharmacy, etc.)?: I discussed the care with another health care provider, see documentation for details.  Discharge. No recommendations on prescription strength medication(s). I considered admission, but ultimately discharged patient reassuring laboratory work up.    FINAL IMPRESSION       ICD-10-CM    1. Chronic nausea  R11.0           ED COURSE   10:52 AM I met and introduced myself to the patient. I gathered initial history and performed my physical exam. We discussed plan for initial workup.   12:00 PM I have staffed the patient with Dr. Pacheco Marcos, ED MD, who has evaluated the patient and agrees with all aspects of today's care.   12:43 PM I rechecked the patient and discussed results, discharge, follow up, and reasons to return to the ED.     MEDICATIONS GIVEN IN THE EMERGENCY DEPARTMENT:   Medications   lactated ringers BOLUS 1,000 mL (0 mLs Intravenous Stopped 3/11/24 1243)   ondansetron  "(ZOFRAN) injection 4 mg (4 mg Intravenous $Given 3/11/24 1181)   alum & mag hydroxide-simethicone (MAALOX) suspension 15 mL (15 mLs Oral $Given 3/11/24 1208)      NEW PRESCRIPTIONS STARTED AT TODAY'S ED VISIT:  Discharge Medication List as of 3/11/2024 12:44 PM              =================================================================         HISTORY OF PRESENT ILLNESS   Patient information was obtained from: patient   Use of Intrepreter: N/A     Sadaf Ross is a 23 year old female with a pertinent history of intellectual disability, suicidal ideation, borderline personality disorder, and bipolar affective disorder who presents to the ED by private vehicle for evaluation of vomiting.  Patient reports she has been experiencing chills, sweats, and fatigue for \"a while.\"  She had 2 episodes of vomiting yesterday and none today.  She presents to the emergency department for continued symptoms.  No blood in vomit.  No chest pain or shortness of breath.  Stools are normal in caliber and without blood, last BM 2 days ago.  Patient normally gets bowel movements every 2 to 3 days.  States that her symptoms are not any better or worse than they have previously been, she does not currently have abdominal pain.  No dysuria, creased urinary frequency, concerns for sexually transmitted infections, vaginal discharge, or abnormal vaginal bleeding. Denies opioids and cannabis use. No history of abdominal surgery. No hematemesis or hematochezia.    Patient has >30 ER visits since Jan 2024 for various complains but mostly diarrhea, abdominal pain, nausea, and vomiting.  When it comes to gastrointestinal related complaints, patient has had multiple CT abdomen pelvis scans done, all without abnormal findings.  She has been prescribed omeprazole, sucralfate, ondansetron, loperamide, pantoprazole, polyethylene glycol, promethazine, sennosides, famotidine, to schedule, and dicyclomine.  Patient has an active care plan for chronic " pain which states that patient should be returned to group home if there are no new symptoms medically and mentally.       Per chart review,  03/08/2024 - Patient seen in [place] ED for mid localized abdominal pain, nausea, and lightheadedness.  Endorses 3 episodes of black, tarry stools.  Ports taking Meprazole.  Left AGAINST MEDICAL ADVICE.  03/07/2024 - Patient seen in [place] ED for evaluation of abdominal pain, nausea, and vomiting.  Provider thinks complaints are somatic and related to anxiety.  Care plan followed patient discharged home.  03/06/2024 - Patient seen in this ED for chest pain, headache, and dizziness.  Labs normal.  Given fluids and discharged home with ondansetron and sucralfate.  12/19/2023 - CT abd pelvis: Unremarkable.  No acute cause for pain demonstrated.  03/01/2022 - CT abd pelvis: Ordered for abdominal pain. No acute cause for pain demonstrated.  01/08/2022 - CT abd pelvis: Ordered for nausea and vomiting.  No acute cause for pain demonstrated.    MEDICAL HISTORY     Past Medical History:   Diagnosis Date    ADHD (attention deficit hyperactivity disorder)     Bipolar 1 disorder (H)     Borderline personality disorder (H)     Depression     Depressive disorder     Intellectual disability     Obesity     Syncope        Past Surgical History:   Procedure Laterality Date    APPENDECTOMY      APPENDECTOMY         Family History   Problem Relation Age of Onset    Diabetes Type 1 Father     Cancer Paternal Grandfather        Social History     Tobacco Use    Smoking status: Some Days     Packs/day: 0.25     Years: 5.00     Additional pack years: 0.00     Total pack years: 1.25     Types: Cigarettes    Smokeless tobacco: Never   Substance Use Topics    Alcohol use: No    Drug use: No       acetaminophen (TYLENOL) 325 MG tablet  albuterol (PROAIR HFA/PROVENTIL HFA/VENTOLIN HFA) 108 (90 Base) MCG/ACT inhaler  ARIPiprazole lauroxil ER (ARISTADA) 882 MG/3.2ML intra-muscular  bisacodyl  "(DULCOLAX) 5 MG EC tablet  calcium carbonate (TUMS) 500 MG chewable tablet  cyclobenzaprine (FLEXERIL) 10 MG tablet  dicyclomine (BENTYL) 20 MG tablet  docusate sodium (COLACE) 50 MG capsule  famotidine (PEPCID) 20 MG tablet  FLUoxetine (PROZAC) 40 MG capsule  hydrocortisone, Perianal, (HYDROCORTISONE) 2.5 % cream  hydrOXYzine (ATARAX) 50 MG tablet  ibuprofen (ADVIL/MOTRIN) 200 MG tablet  loperamide (IMODIUM A-D) 2 MG tablet  LORazepam (ATIVAN) 0.5 MG tablet  mupirocin (BACTROBAN) 2 % external ointment  OLANZapine zydis (ZYPREXA) 5 MG ODT  ondansetron (ZOFRAN) 4 MG tablet  ondansetron (ZOFRAN) 4 MG tablet  pantoprazole (PROTONIX) 40 MG EC tablet  polyethylene glycol (MIRALAX) 17 GM/Dose powder  promethazine (PHENERGAN) 12.5 MG tablet  sennosides (SENOKOT) 8.6 MG tablet  sucralfate (CARAFATE) 1 GM tablet  traZODone (DESYREL) 50 MG tablet  Vitamin D, Cholecalciferol, 25 MCG (1000 UT) TABS        PHYSICAL EXAM   VITALS:  Patient Vitals for the past 24 hrs:   BP Temp Temp src Pulse Resp SpO2 Height Weight   03/11/24 1243 122/64 -- -- 74 -- 98 % -- --   03/11/24 1028 (!) 143/84 98.2  F (36.8  C) Temporal 82 16 99 % 1.6 m (5' 3\") 114.8 kg (253 lb)       Physical Exam  Vitals and nursing note reviewed.   Constitutional:       General: She is not in acute distress.     Appearance: She is not ill-appearing, toxic-appearing or diaphoretic.   HENT:      Head: Normocephalic and atraumatic.      Right Ear: Tympanic membrane and ear canal normal.      Left Ear: Tympanic membrane and ear canal normal.      Mouth/Throat:      Pharynx: Oropharynx is clear. Uvula midline.      Tonsils: 0 on the right. 0 on the left.   Cardiovascular:      Rate and Rhythm: Normal rate and regular rhythm.      Heart sounds: S1 normal and S2 normal.   Pulmonary:      Effort: No respiratory distress.      Breath sounds: Normal breath sounds. No decreased breath sounds, wheezing, rhonchi or rales.   Abdominal:      General: There is no distension.      " Palpations: Abdomen is soft.      Tenderness: There is no abdominal tenderness. There is no right CVA tenderness, left CVA tenderness, guarding or rebound. Negative signs include Santana's sign.   Musculoskeletal:      Cervical back: Full passive range of motion without pain. No rigidity.         LABS AND IMAGING   All pertinent labs reviewed and interpreted  Labs Ordered and Resulted from Time of ED Arrival to Time of ED Departure   ROUTINE UA WITH MICROSCOPIC REFLEX TO CULTURE - Abnormal       Result Value    Color Urine Yellow      Appearance Urine Slightly Cloudy (*)     Glucose Urine Negative      Bilirubin Urine Negative      Ketones Urine Negative      Specific Gravity Urine 1.034 (*)     Blood Urine 0.03 mg/dL (*)     pH Urine 5.5      Protein Albumin Urine 30 (*)     Urobilinogen Urine <2.0      Nitrite Urine Negative      Leukocyte Esterase Urine 25 Mindy/uL (*)     Mucus Urine Present (*)     RBC Urine 1      WBC Urine 5      Squamous Epithelials Urine 14 (*)    BASIC METABOLIC PANEL - Abnormal    Sodium 142      Potassium 4.4      Chloride 106      Carbon Dioxide (CO2) 26      Anion Gap 10      Urea Nitrogen 13.1      Creatinine 0.88      GFR Estimate >90      Calcium 10.1 (*)     Glucose 107 (*)    HEPATIC FUNCTION PANEL - Abnormal    Protein Total 8.7 (*)     Albumin 4.9      Bilirubin Total 0.6      Alkaline Phosphatase 72      AST 15      ALT 18      Bilirubin Direct <0.20     HCG QUALITATIVE URINE - Normal    hCG Urine Qualitative Negative     CBC WITH PLATELETS - Normal    WBC Count 7.9      RBC Count 5.04      Hemoglobin 13.8      Hematocrit 41.9      MCV 83      MCH 27.4      MCHC 32.9      RDW 13.2      Platelet Count 280     MAGNESIUM - Normal    Magnesium 2.1         No orders to display     Karley Foster PA-C   Emergency Medicine   [facility] EMERGENCY DEPARTMENT  [facility] [facility] [id number] [phone]  Dept: [phone]      Karley Foster,  MARLEN  03/11/24 1619

## 2024-03-11 NOTE — DISCHARGE INSTRUCTIONS
The exact cause for your pain is unclear, but your pain does not appear to be due to a cause requiring emergent intervention at this time.      Continue your medications at home and use the prescribed ondansetron as needed for nausea and vomiting.    Please refer to the attachments provided for more information on how to care for yourself at home.    FOLLOW UP  Please schedule an appointment with any provider at your primary care clinic within 3-5 days for a more comprehensive evaluation and follow up.      Multiple GI referrals have been placed for you. Please call [phone] to schedule follow up.       Always remember that abdominal pain can be caused by a wide range of conditions, from mild to severe. If you are uncertain about whether to return to the ED or seek further medical care, it's best to err on the side of caution and seek prompt evaluation.    Call [phone] anytime you think you may need emergency care. For example, call if:    You passed out (lost consciousness).     You pass maroon or very bloody stools.     You vomit blood or what looks like coffee grounds.     You have severe belly pain.   Call your doctor now or seek immediate medical care if:    Your pain gets worse, especially if it becomes focused in one area of your belly.     You have a new or higher fever.     Your stools are black and look like tar, or they have streaks of blood.     You have unexpected vaginal bleeding.     You have symptoms of a urinary tract infection. These may include:  Pain when you urinate.  Urinating more often than usual.  Blood in your urine.     You are dizzy or lightheaded, or you feel like you may faint.   Watch closely for changes in your health, and be sure to contact your doctor if:    You are not getting better as expected.

## 2024-03-14 ENCOUNTER — HOSPITAL ENCOUNTER (EMERGENCY)
Facility: CLINIC | Age: 24
Discharge: HOME | End: 2024-03-14
Attending: FAMILY MEDICINE | Admitting: FAMILY MEDICINE
Payer: MEDICARE

## 2024-03-14 VITALS
TEMPERATURE: 98.7 F | BODY MASS INDEX: 42.26 KG/M2 | HEART RATE: 98 BPM | WEIGHT: 238.5 LBS | RESPIRATION RATE: 16 BRPM | DIASTOLIC BLOOD PRESSURE: 85 MMHG | HEIGHT: 63 IN | SYSTOLIC BLOOD PRESSURE: 141 MMHG | OXYGEN SATURATION: 100 %

## 2024-03-14 DIAGNOSIS — F79 INTELLECTUAL DISABILITY: Chronic | ICD-10-CM

## 2024-03-14 DIAGNOSIS — F60.3 BORDERLINE PERSONALITY DISORDER (H): Chronic | ICD-10-CM

## 2024-03-14 PROCEDURE — 99283 EMERGENCY DEPT VISIT LOW MDM: CPT | Performed by: FAMILY MEDICINE

## 2024-03-14 PROCEDURE — 250N000013 HC RX MED GY IP 250 OP 250 PS 637: Performed by: FAMILY MEDICINE

## 2024-03-14 PROCEDURE — 99284 EMERGENCY DEPT VISIT MOD MDM: CPT | Performed by: FAMILY MEDICINE

## 2024-03-14 RX ORDER — LORAZEPAM 1 MG/1
1 TABLET ORAL ONCE
Status: COMPLETED | OUTPATIENT
Start: 2024-03-14 | End: 2024-03-14

## 2024-03-14 RX ORDER — ACETAMINOPHEN 325 MG/1
650 TABLET ORAL ONCE
Status: COMPLETED | OUTPATIENT
Start: 2024-03-14 | End: 2024-03-14

## 2024-03-14 RX ORDER — OLANZAPINE 10 MG/1
10 TABLET, ORALLY DISINTEGRATING ORAL ONCE
Status: COMPLETED | OUTPATIENT
Start: 2024-03-14 | End: 2024-03-14

## 2024-03-14 RX ADMIN — ACETAMINOPHEN 650 MG: 325 TABLET, FILM COATED ORAL at 14:47

## 2024-03-14 RX ADMIN — LORAZEPAM 1 MG: 1 TABLET ORAL at 14:10

## 2024-03-14 RX ADMIN — OLANZAPINE 10 MG: 10 TABLET, ORALLY DISINTEGRATING ORAL at 15:38

## 2024-03-14 ASSESSMENT — ACTIVITIES OF DAILY LIVING (ADL)
ADLS_ACUITY_SCORE: 39

## 2024-03-14 NOTE — ED NOTES
"Transport is here to bring pt back to . Pt crying, not being cooperative. Writer talked to pt. Pt agreed to go, but states, \"I am coming back tonight\". Pt also made homicidal statements towards her roommate.   "

## 2024-03-14 NOTE — ED PROVIDER NOTES
ED Provider Note  [facility]      History     Chief Complaint   Patient presents with    Suicidal     Pt states she is suicidal and nauseous     The history is provided by the patient and medical records.     Sadaf Ross is a 23 year old female with history of intellectual disability, borderline personality disorder, bipolar 1 disorder who is well-known to the ED d/t her frequent ED visits presenting to the ED for suicidal ideation.  Patient states she had a conflict with her roommate and has felt as though the situation at her current living environment has been escalating she was tearful and upset but willing to take medications I discussed potential for her to be returning back to her group home.    Past Medical History  Past Medical History:   Diagnosis Date    ADHD (attention deficit hyperactivity disorder)     Bipolar 1 disorder (H)     Borderline personality disorder (H)     Depression     Depressive disorder     Intellectual disability     Obesity     Syncope      Past Surgical History:   Procedure Laterality Date    APPENDECTOMY      APPENDECTOMY       acetaminophen (TYLENOL) 325 MG tablet  albuterol (PROAIR HFA/PROVENTIL HFA/VENTOLIN HFA) 108 (90 Base) MCG/ACT inhaler  ARIPiprazole lauroxil ER (ARISTADA) 882 MG/3.2ML intra-muscular  bisacodyl (DULCOLAX) 5 MG EC tablet  calcium carbonate (TUMS) 500 MG chewable tablet  cyclobenzaprine (FLEXERIL) 10 MG tablet  dicyclomine (BENTYL) 20 MG tablet  docusate sodium (COLACE) 50 MG capsule  famotidine (PEPCID) 20 MG tablet  FLUoxetine (PROZAC) 40 MG capsule  hydrocortisone, Perianal, (HYDROCORTISONE) 2.5 % cream  hydrOXYzine (ATARAX) 50 MG tablet  ibuprofen (ADVIL/MOTRIN) 200 MG tablet  loperamide (IMODIUM A-D) 2 MG tablet  LORazepam (ATIVAN) 0.5 MG tablet  mupirocin (BACTROBAN) 2 % external ointment  OLANZapine zydis (ZYPREXA) 5 MG ODT  ondansetron (ZOFRAN) 4 MG tablet  ondansetron (ZOFRAN) 4 MG tablet  pantoprazole (PROTONIX) 40  "MG EC tablet  polyethylene glycol (MIRALAX) 17 GM/Dose powder  promethazine (PHENERGAN) 12.5 MG tablet  sennosides (SENOKOT) 8.6 MG tablet  traZODone (DESYREL) 50 MG tablet  Vitamin D, Cholecalciferol, 25 MCG (1000 UT) TABS      Allergies   Allergen Reactions    Penicillins Rash and Unknown     Family History  Family History   Problem Relation Age of Onset    Diabetes Type 1 Father     Cancer Paternal Grandfather      Social History   Social History     Tobacco Use    Smoking status: Some Days     Packs/day: 0.25     Years: 5.00     Additional pack years: 0.00     Total pack years: 1.25     Types: Cigarettes, Vaping Device    Smokeless tobacco: Never   Substance Use Topics    Alcohol use: No    Drug use: No         A medically appropriate review of systems was performed with pertinent positives and negatives noted in the HPI, and all other systems negative.    Physical Exam   BP: (!) 141/85  Pulse: 98  Temp: 98.7  F (37.1  C)  Resp: 16  Height: 160 cm (5' 3\")  Weight: 108.2 kg (238 lb 8 oz)  SpO2: 100 %  Physical Exam  Vitals and nursing note reviewed.   Constitutional:       General: She is not in acute distress.     Appearance: Normal appearance. She is not diaphoretic.   HENT:      Head: Atraumatic.      Mouth/Throat:      Mouth: Mucous membranes are moist.   Eyes:      General: No scleral icterus.     Conjunctiva/sclera: Conjunctivae normal.   Cardiovascular:      Rate and Rhythm: Normal rate.      Heart sounds: Normal heart sounds.   Pulmonary:      Effort: No respiratory distress.      Breath sounds: Normal breath sounds.   Abdominal:      General: Abdomen is flat.   Musculoskeletal:      Cervical back: Neck supple.   Skin:     General: Skin is warm.      Findings: No rash.   Neurological:      General: No focal deficit present.      Mental Status: She is alert and oriented to person, place, and time.      Sensory: No sensory deficit.      Motor: No weakness.      Coordination: Coordination normal. "   Psychiatric:         Mood and Affect: Mood is anxious and depressed. Affect is tearful.         Speech: Speech normal.         Behavior: Behavior is cooperative.         Judgment: Judgment is impulsive.           ED Course, Procedures, & Data      Procedures          No results found for any visits on 03/14/24.  Medications   LORazepam (ATIVAN) tablet 1 mg (1 mg Oral $Given 3/14/24 1410)   acetaminophen (TYLENOL) tablet 650 mg (650 mg Oral $Given 3/14/24 1447)   OLANZapine zydis (zyPREXA) ODT tab 10 mg (10 mg Oral $Given 3/14/24 1538)     Labs Ordered and Resulted from Time of ED Arrival to Time of ED Departure - No data to display  No orders to display          Critical care was not performed.     Medical Decision Making  The patient's presentation was of moderate complexity (a chronic illness mild to moderate exacerbation, progression, or side effect of treatment).    The patient's evaluation involved:  history and exam without other MDM data elements    The patient's management necessitated moderate risk (prescription drug management including medications given in the ED).    Assessment & Plan        I have reviewed the nursing notes. I have reviewed the findings, diagnosis, plan and need for follow up with the patient.    Patient will be discharged back to group home she has improved after Ativan and Zyprexa she will continue with current outpatient services.    Final diagnoses:   Intellectual disability   Borderline personality disorder (H)       Robert Olea MD  [facility] EMERGENCY DEPARTMENT  3/14/2024     Robert Olea MD  03/14/24 2223

## 2024-03-14 NOTE — ED NOTES
Patient eventually cooperative to discharge. Provided paperwork and instructions. Report called to group home.

## 2024-03-14 NOTE — ED TRIAGE NOTES
Triage Assessment (Adult)       Row Name 03/14/24 1322          Triage Assessment    Airway WDL WDL        Respiratory WDL    Respiratory WDL WDL        Skin Circulation/Temperature WDL    Skin Circulation/Temperature WDL WDL        Cardiac WDL    Cardiac WDL WDL        Peripheral/Neurovascular WDL    Peripheral Neurovascular WDL WDL        Cognitive/Neuro/Behavioral WDL    Cognitive/Neuro/Behavioral WDL WDL        [place] Coma Scale    Best Eye Response 4-->(E4) spontaneous     Best Motor Response 6-->(M6) obeys commands     Best Verbal Response 5-->(V5) oriented     Coby Coma Scale Score 15

## 2024-03-14 NOTE — ED NOTES
Patient to discharge to group [place]. Transport arranged by [facility]. Dispo discussed with group [place], patient expected back this evening.

## 2024-03-15 ENCOUNTER — HOSPITAL ENCOUNTER (EMERGENCY)
Facility: CLINIC | Age: 24
Discharge: HOME | End: 2024-03-15
Attending: EMERGENCY MEDICINE | Admitting: EMERGENCY MEDICINE
Payer: MEDICARE

## 2024-03-15 VITALS
DIASTOLIC BLOOD PRESSURE: 100 MMHG | HEART RATE: 106 BPM | RESPIRATION RATE: 18 BRPM | SYSTOLIC BLOOD PRESSURE: 169 MMHG | TEMPERATURE: 98.2 F | OXYGEN SATURATION: 99 %

## 2024-03-15 DIAGNOSIS — F60.3 BORDERLINE PERSONALITY DISORDER (H): ICD-10-CM

## 2024-03-15 DIAGNOSIS — F79 INTELLECTUAL DISABILITY: ICD-10-CM

## 2024-03-15 PROCEDURE — 99283 EMERGENCY DEPT VISIT LOW MDM: CPT | Performed by: EMERGENCY MEDICINE

## 2024-03-15 PROCEDURE — 99284 EMERGENCY DEPT VISIT MOD MDM: CPT | Performed by: EMERGENCY MEDICINE

## 2024-03-15 ASSESSMENT — COLUMBIA-SUICIDE SEVERITY RATING SCALE - C-SSRS
5. HAVE YOU STARTED TO WORK OUT OR WORKED OUT THE DETAILS OF HOW TO KILL YOURSELF? DO YOU INTEND TO CARRY OUT THIS PLAN?: NO
1. IN THE PAST MONTH, HAVE YOU WISHED YOU WERE DEAD OR WISHED YOU COULD GO TO SLEEP AND NOT WAKE UP?: YES
2. HAVE YOU ACTUALLY HAD ANY THOUGHTS OF KILLING YOURSELF IN THE PAST MONTH?: YES
3. HAVE YOU BEEN THINKING ABOUT HOW YOU MIGHT KILL YOURSELF?: YES
4. HAVE YOU HAD THESE THOUGHTS AND HAD SOME INTENTION OF ACTING ON THEM?: NO
6. HAVE YOU EVER DONE ANYTHING, STARTED TO DO ANYTHING, OR PREPARED TO DO ANYTHING TO END YOUR LIFE?: YES

## 2024-03-15 ASSESSMENT — ACTIVITIES OF DAILY LIVING (ADL)
ADLS_ACUITY_SCORE: 39
ADLS_ACUITY_SCORE: 39

## 2024-03-15 NOTE — ED NOTES
"Pt given AVS. Escorted outside to cab. While pt was walking past the charge desk she stated, \"I am going to come back tonight and I'm going to punch people\".   "

## 2024-03-15 NOTE — ED NOTES
Per Dr Sena to send patient back to the group home by i, staff at the group home is aware that patient is going back.

## 2024-03-15 NOTE — ED TRIAGE NOTES
Pt states she is always SI and more so today with a plan to bite herself.  Pt states she and her roommate have been fighting a lot about her her step mother. Pt she spit on the floor towards her roommate but states she didn't do it to be mean. Pt states her staff and roommate threatened that they would call the police on her and pt ultimately ended up coming to ER.     Triage Assessment (Adult)       Row Name 03/15/24 1526          Triage Assessment    Airway WDL WDL        Respiratory WDL    Respiratory WDL WDL        Skin Circulation/Temperature WDL    Skin Circulation/Temperature WDL WDL        Cardiac WDL    Cardiac WDL WDL        Peripheral/Neurovascular WDL    Peripheral Neurovascular WDL WDL        Cognitive/Neuro/Behavioral WDL    Cognitive/Neuro/Behavioral WDL WDL

## 2024-03-17 ENCOUNTER — HOSPITAL ENCOUNTER (EMERGENCY)
Facility: CLINIC | Age: 24
Discharge: HOME | End: 2024-03-18
Attending: EMERGENCY MEDICINE | Admitting: EMERGENCY MEDICINE
Payer: MEDICARE

## 2024-03-17 ENCOUNTER — NURSE TRIAGE (OUTPATIENT)
Dept: NURSING | Facility: CLINIC | Age: 24
End: 2024-03-17
Payer: MEDICARE

## 2024-03-17 DIAGNOSIS — R10.84 GENERALIZED ABDOMINAL PAIN: ICD-10-CM

## 2024-03-17 LAB
ALBUMIN UR-MCNC: NEGATIVE MG/DL
APPEARANCE UR: CLEAR
BASOPHILS # BLD AUTO: 0 10E3/UL (ref 0–0.2)
BASOPHILS NFR BLD AUTO: 0 %
BILIRUB UR QL STRIP: NEGATIVE
COLOR UR AUTO: ABNORMAL
EOSINOPHIL # BLD AUTO: 0.3 10E3/UL (ref 0–0.7)
EOSINOPHIL NFR BLD AUTO: 3 %
ERYTHROCYTE [DISTWIDTH] IN BLOOD BY AUTOMATED COUNT: 13.5 % (ref 10–15)
GLUCOSE UR STRIP-MCNC: NEGATIVE MG/DL
HCG UR QL: NEGATIVE
HCT VFR BLD AUTO: 35.2 % (ref 35–47)
HGB BLD-MCNC: 11.9 G/DL (ref 11.7–15.7)
HGB UR QL STRIP: ABNORMAL
IMM GRANULOCYTES # BLD: 0 10E3/UL
IMM GRANULOCYTES NFR BLD: 0 %
KETONES UR STRIP-MCNC: NEGATIVE MG/DL
LEUKOCYTE ESTERASE UR QL STRIP: NEGATIVE
LIPASE SERPL-CCNC: 59 U/L (ref 13–60)
LYMPHOCYTES # BLD AUTO: 4.2 10E3/UL (ref 0.8–5.3)
LYMPHOCYTES NFR BLD AUTO: 43 %
MCH RBC QN AUTO: 27.7 PG (ref 26.5–33)
MCHC RBC AUTO-ENTMCNC: 33.8 G/DL (ref 31.5–36.5)
MCV RBC AUTO: 82 FL (ref 78–100)
MONOCYTES # BLD AUTO: 0.5 10E3/UL (ref 0–1.3)
MONOCYTES NFR BLD AUTO: 6 %
MUCOUS THREADS #/AREA URNS LPF: PRESENT /LPF
NEUTROPHILS # BLD AUTO: 4.7 10E3/UL (ref 1.6–8.3)
NEUTROPHILS NFR BLD AUTO: 48 %
NITRATE UR QL: NEGATIVE
NRBC # BLD AUTO: 0 10E3/UL
NRBC BLD AUTO-RTO: 0 /100
PH UR STRIP: 5.5 [PH] (ref 5–7)
PLATELET # BLD AUTO: 248 10E3/UL (ref 150–450)
RBC # BLD AUTO: 4.3 10E6/UL (ref 3.8–5.2)
RBC URINE: 1 /HPF
SP GR UR STRIP: 1.02 (ref 1–1.03)
SQUAMOUS EPITHELIAL: 1 /HPF
UROBILINOGEN UR STRIP-MCNC: NORMAL MG/DL
WBC # BLD AUTO: 9.7 10E3/UL (ref 4–11)
WBC URINE: 1 /HPF

## 2024-03-17 PROCEDURE — 83690 ASSAY OF LIPASE: CPT | Performed by: EMERGENCY MEDICINE

## 2024-03-17 PROCEDURE — 99283 EMERGENCY DEPT VISIT LOW MDM: CPT | Performed by: EMERGENCY MEDICINE

## 2024-03-17 PROCEDURE — 85025 COMPLETE CBC W/AUTO DIFF WBC: CPT | Performed by: EMERGENCY MEDICINE

## 2024-03-17 PROCEDURE — 36415 COLL VENOUS BLD VENIPUNCTURE: CPT | Performed by: EMERGENCY MEDICINE

## 2024-03-17 PROCEDURE — 82247 BILIRUBIN TOTAL: CPT | Performed by: EMERGENCY MEDICINE

## 2024-03-17 PROCEDURE — 81001 URINALYSIS AUTO W/SCOPE: CPT | Performed by: EMERGENCY MEDICINE

## 2024-03-17 PROCEDURE — 81025 URINE PREGNANCY TEST: CPT | Performed by: EMERGENCY MEDICINE

## 2024-03-17 ASSESSMENT — ACTIVITIES OF DAILY LIVING (ADL): ADLS_ACUITY_SCORE: 39

## 2024-03-17 NOTE — ED PROVIDER NOTES
"ED Provider Note  [facility]      History     Chief Complaint   Patient presents with    Suicidal     Spitting on floor at group home and group home tried to redirect her and she got upset and said she was suicidal.  Pt brought in by EMS     HPI  Sadaf Ross is a 23 year old female with BPD and intellectual disability who presents to the ED from her group home saying she was having issues with her roommate and staff says she now cannot return.  She told triage she was suicidal.  Per group home staff the patient has started to \"escalate her going to the ER behaviors\" and hasn't been taking her medications.  The patient is her own legal guardian so they cannot take her phone away from her.  Therefore she keeps calling ems.  The group home staff says she went to the ER yesterday.  This morning she ate breakfast and then called 911.  She went to the ER, was discharged and came home to eat lunch.  She then called ems and was brought here. They have not seen any suicidal concerns from her other than her saying she is suicidal so she can go visit an ER. Group home staff has no issues with her return.t        Physical Exam   BP: (!) 169/100  Pulse: 106  Temp: 98.2  F (36.8  C)  Resp: 18  SpO2: 99 %  Physical Exam  Vitals and nursing note reviewed.   Constitutional:       General: She is not in acute distress.     Appearance: She is obese. She is not ill-appearing, toxic-appearing or diaphoretic.   HENT:      Head: Normocephalic and atraumatic.      Nose: Nose normal.   Eyes:      Extraocular Movements: Extraocular movements intact.   Cardiovascular:      Rate and Rhythm: Normal rate.   Pulmonary:      Effort: Pulmonary effort is normal.   Musculoskeletal:         General: No deformity or signs of injury.      Cervical back: Normal range of motion.   Skin:     Coloration: Skin is not jaundiced or pale.   Neurological:      General: No focal deficit present.      Mental Status: She is alert " and oriented to person, place, and time.   Psychiatric:         Attention and Perception: Attention and perception normal.         Mood and Affect: Mood normal.         Speech: Speech normal.         Behavior: Behavior normal. Behavior is cooperative.         Thought Content: Thought content normal.         Cognition and Memory: Memory normal.         Judgment: Judgment is impulsive and inappropriate.           ED Course, Procedures, & Data      Procedures          No results found for any visits on 03/15/24.  Medications - No data to display  Labs Ordered and Resulted from Time of ED Arrival to Time of ED Departure - No data to display  No orders to display          Critical care was not performed.     Medical Decision Making  The patient's presentation was of moderate complexity (a chronic illness mild to moderate exacerbation, progression, or side effect of treatment).    The patient's evaluation involved:  an assessment requiring an independent historian (group home staff)  review of external note(s) from 3+ sources (recent ed visits in past few days)    The patient's management necessitated high risk (a decision regarding hospitalization).    Assessment & Plan    The patient has BPD, intellectual disability and is well known to the ED for frequent visits to the ED.  She seems to come to the ED for enjoyment and boredom.  She is her own legal guardian so group home staff say then cannot prevent her calling ems or uber.  She presents staying she had issues with a roommate and was suicidal. This is not new behavior and group home staff say there were no issues or si concerns. She is at baseline and so was discharged home.     I have reviewed the nursing notes. I have reviewed the findings, diagnosis, plan and need for follow up with the patient.    Discharge Medication List as of 3/15/2024  5:28 PM          Final diagnoses:   Borderline personality disorder (H)   Intellectual disability       Ashely CANO  [facility] EMERGENCY DEPARTMENT  3/15/2024     Ashely Toth MD  03/17/24 0957

## 2024-03-18 VITALS
TEMPERATURE: 98.3 F | SYSTOLIC BLOOD PRESSURE: 108 MMHG | OXYGEN SATURATION: 100 % | RESPIRATION RATE: 17 BRPM | DIASTOLIC BLOOD PRESSURE: 53 MMHG | HEART RATE: 77 BPM

## 2024-03-18 LAB
ALBUMIN SERPL BCG-MCNC: 4.4 G/DL (ref 3.5–5.2)
ALP SERPL-CCNC: 64 U/L (ref 40–150)
ALT SERPL W P-5'-P-CCNC: 40 U/L (ref 0–50)
ANION GAP SERPL CALCULATED.3IONS-SCNC: 11 MMOL/L (ref 7–15)
AST SERPL W P-5'-P-CCNC: 25 U/L (ref 0–45)
BILIRUB SERPL-MCNC: 0.2 MG/DL
BUN SERPL-MCNC: 14.5 MG/DL (ref 6–20)
CALCIUM SERPL-MCNC: 9.1 MG/DL (ref 8.6–10)
CHLORIDE SERPL-SCNC: 104 MMOL/L (ref 98–107)
CREAT SERPL-MCNC: 0.86 MG/DL (ref 0.51–0.95)
DEPRECATED HCO3 PLAS-SCNC: 20 MMOL/L (ref 22–29)
EGFRCR SERPLBLD CKD-EPI 2021: >90 ML/MIN/1.73M2
GLUCOSE SERPL-MCNC: 89 MG/DL (ref 70–99)
POTASSIUM SERPL-SCNC: 3.7 MMOL/L (ref 3.4–5.3)
PROT SERPL-MCNC: 6.8 G/DL (ref 6.4–8.3)
SODIUM SERPL-SCNC: 135 MMOL/L (ref 135–145)

## 2024-03-18 NOTE — ED NOTES
Bed: ED02  Expected date: 3/17/24  Expected time: 10:21 PM  Means of arrival: Ambulance  Comments:  [address]  23 F  ABD pain

## 2024-03-18 NOTE — ED PROVIDER NOTES
[facility] EMERGENCY DEPARTMENT ([facility])    3/17/24      ED PROVIDER NOTE    History     Chief Complaint   Patient presents with    Abdominal Pain     BIBA from .      HPI  Sadaf Ross is a 23 year old female who presents to the ED BIBA from  with abdominal pain. She has an ED care plan in place d/t frequent ED visits for chronic pain and MH issues.     Patient states that she woke up at 7 AM this morning like usual and she felt lower abdominal pain.  It feels sore, located in the middle lower abdomen.  It is not radiating anywhere.  She states that she has not had pain like this before.  She has been having urinary frequency and urgency.  Denies any dysuria, abnormal vaginal discharge, concern for sexually transmitted infections or concern for pregnancy.  She has a Depo-Provera shot once every 3 months.  Denies any history of kidney stones.  She has had a urinary tract infection 1 time ago however does not remember if these are the same symptoms.  Denies any fevers, chills, back pain, nausea, vomiting, diarrhea    Past Medical History  Past Medical History:   Diagnosis Date    ADHD (attention deficit hyperactivity disorder)     Bipolar 1 disorder (H)     Borderline personality disorder (H)     Depression     Depressive disorder     Intellectual disability     Obesity     Syncope      Past Surgical History:   Procedure Laterality Date    APPENDECTOMY      APPENDECTOMY       acetaminophen (TYLENOL) 325 MG tablet  albuterol (PROAIR HFA/PROVENTIL HFA/VENTOLIN HFA) 108 (90 Base) MCG/ACT inhaler  ARIPiprazole lauroxil ER (ARISTADA) 882 MG/3.2ML intra-muscular  bisacodyl (DULCOLAX) 5 MG EC tablet  calcium carbonate (TUMS) 500 MG chewable tablet  cyclobenzaprine (FLEXERIL) 10 MG tablet  dicyclomine (BENTYL) 20 MG tablet  docusate sodium (COLACE) 50 MG capsule  famotidine (PEPCID) 20 MG tablet  FLUoxetine (PROZAC) 40 MG capsule  hydrocortisone, Perianal, (HYDROCORTISONE) 2.5 % cream  hydrOXYzine  (ATARAX) 50 MG tablet  ibuprofen (ADVIL/MOTRIN) 200 MG tablet  loperamide (IMODIUM A-D) 2 MG tablet  LORazepam (ATIVAN) 0.5 MG tablet  mupirocin (BACTROBAN) 2 % external ointment  OLANZapine zydis (ZYPREXA) 5 MG ODT  ondansetron (ZOFRAN) 4 MG tablet  ondansetron (ZOFRAN) 4 MG tablet  pantoprazole (PROTONIX) 40 MG EC tablet  polyethylene glycol (MIRALAX) 17 GM/Dose powder  promethazine (PHENERGAN) 12.5 MG tablet  sennosides (SENOKOT) 8.6 MG tablet  traZODone (DESYREL) 50 MG tablet  Vitamin D, Cholecalciferol, 25 MCG (1000 UT) TABS      Allergies   Allergen Reactions    Penicillins Rash and Unknown     Family History  Family History   Problem Relation Age of Onset    Diabetes Type 1 Father     Cancer Paternal Grandfather      Social History   Social History     Tobacco Use    Smoking status: Some Days     Packs/day: 0.25     Years: 5.00     Additional pack years: 0.00     Total pack years: 1.25     Types: Cigarettes, Vaping Device    Smokeless tobacco: Never   Substance Use Topics    Alcohol use: No    Drug use: No      Past medical history, past surgical history, medications, allergies, family history, and social history were reviewed with the patient. No additional pertinent items.      A medically appropriate review of systems was performed with pertinent positives and negatives noted in the HPI, and all other systems negative.    Physical Exam   BP: 108/53  Pulse: 77  Temp: 98.3  F (36.8  C)  Resp: 17  SpO2: 100 %  Physical Exam  General: No acute distress.  Obese  HENT: Normocephalic and atraumatic.   Eyes: EOMI. Conjunctivae normal.   Cardiovascular: Normal rate and regular rhythm.  Normal heart sounds. No murmur heard.  Pulmonary: No respiratory distress. Normal breath sounds.   Abdominal: There is no distension. Abdomen is soft. There is no mass.  Mild abdominal tenderness in the epigastrium, suprapubic, right lower quadrant and left lower quadrant.  No rebound or guarding.  Musculoskeletal: No swelling or  tenderness. Moving all extremities spontaneously.    Skin: Warm and dry  Neurological: No focal deficit present.   Mood and Affect: Mood normal.   ED Course, Procedures, & Data      Procedures               No results found for any visits on 03/17/24.  Medications - No data to display  Labs Ordered and Resulted from Time of ED Arrival to Time of ED Departure - No data to display  No orders to display          Critical care was not performed.     Medical Decision Making  The patient's presentation was of high complexity (a chronic illness severe exacerbation, progression, or side effect of treatment).    The patient's evaluation involved:  review of external note(s) from 3+ sources (see separate area of note for details)  review of 3+ test result(s) ordered prior to this encounter (see separate area of note for details)  ordering and/or review of 3+ test(s) in this encounter (see separate area of note for details)     The patient's management necessitated further care after sign-out to Dr. Browning    Assessment & Plan    Patient presents emergency department for 1 day of lower abdominal pain, urinary frequency and urgency.  Exam with only minimal reproducible abdominal pain of the entire lower abdomen and epigastrium.  Patient presents emergency department frequently for chronic pain as well as suicidal ideation.  She denies any suicidal ideation or mental health concerns today.      She declined any pain medication.  UA negative for urinary tract infection.  Urine pregnancy test negative.  Lab work so far is reassuring, CMP is pending.  On reevaluation, patient states that her pain is  resolved without any intervention.  I do not feel that any imaging or further workup is needed at this time.  She feels safe discharging back to group home.    Patient care signed out to oncoming physician to follow-up on CMP.  I have reviewed the nursing notes. I have reviewed the findings, diagnosis, plan and need for follow up with  the patient.    New Prescriptions    No medications on file       Final diagnoses:   None       [facility] EMERGENCY DEPARTMENT  3/17/2024     Isa Celis MD  03/18/24 0024

## 2024-03-18 NOTE — ED NOTES
This writer spoke with Devyn,  staff, at [phone]. She was informed that Sadaf was discharged and she's coming back to her  via ambulance.

## 2024-03-18 NOTE — ED NOTES
Ambulance was canceled. Patient will be discharge back to  via taxi. Prashant,  staff at [phone], was informed.

## 2024-03-18 NOTE — ED PROVIDER NOTES
Patient received in signout from Dr. Celsi.  History of multiple personality disorder/psychiatric issues, frequent ED visits, presents for complaint of abdominal pain.  No localizing pain.  No indication for imaging.  Symptoms improved withou treatment.  On signout is to follow-up with metabolic panel results, if negative and patient continues to improve, can discharge back to group home.    Metabolic panel reviewed.  Normal electrolytes.  Normal renal function.  No transaminitis or bilirubin elevation to suggest better bili pathology.  Patient says that her symptoms have resolved and is requesting discharge.     Ghassan Browning,   03/18/24 0015

## 2024-03-18 NOTE — TELEPHONE ENCOUNTER
Caller:   Patient    Situation:   Lower abdominal pain x a couple  Not eating much - d/t nausea vomiting; for a couple days  Heat is not working  Cramping - pain level 9/10  Get depo shot      Background:  Patient w/ multiple ED visits in the last couple days for SI; denies SI is the issue tonight      Assessment:  Needs to be evaluated -- according patient's report of symptoms          Recommendation:  Disposition: go to ED; urged to talk to group home staff about her pain    Reviewed care advise with caller.   Informed to call back w/ any questions or new concerns.    Patient verbalized understanding of care advice.        Debi Barnes RN, BSN  Triage Nurse Advisor      Reason for Disposition   [1] SEVERE pain (e.g., excruciating) AND [2] present > 1 hour    Additional Information   Negative: Shock suspected (e.g., cold/pale/clammy skin, too weak to stand, low BP, rapid pulse)   Negative: Difficult to awaken or acting confused (e.g., disoriented, slurred speech)   Negative: Passed out (i.e., lost consciousness, collapsed and was not responding)   Negative: Sounds like a life-threatening emergency to the triager    Protocols used: Abdominal Pain - Female-A-AH

## 2024-03-20 ENCOUNTER — HOSPITAL ENCOUNTER (EMERGENCY)
Facility: CLINIC | Age: 24
Discharge: NURSING, REHAB OR LONG-TERM CARE | End: 2024-03-20
Attending: INTERNAL MEDICINE | Admitting: INTERNAL MEDICINE
Payer: MEDICARE

## 2024-03-20 VITALS
BODY MASS INDEX: 42.17 KG/M2 | RESPIRATION RATE: 18 BRPM | HEART RATE: 91 BPM | WEIGHT: 238 LBS | DIASTOLIC BLOOD PRESSURE: 69 MMHG | TEMPERATURE: 98.2 F | OXYGEN SATURATION: 99 % | SYSTOLIC BLOOD PRESSURE: 122 MMHG | HEIGHT: 63 IN

## 2024-03-20 DIAGNOSIS — R12 HEARTBURN: ICD-10-CM

## 2024-03-20 LAB
ALBUMIN SERPL BCG-MCNC: 4.2 G/DL (ref 3.5–5.2)
ALP SERPL-CCNC: 62 U/L (ref 40–150)
ALT SERPL W P-5'-P-CCNC: 19 U/L (ref 0–50)
ANION GAP SERPL CALCULATED.3IONS-SCNC: 10 MMOL/L (ref 7–15)
AST SERPL W P-5'-P-CCNC: 14 U/L (ref 0–45)
ATRIAL RATE - MUSE: 86 BPM
BASOPHILS # BLD AUTO: 0 10E3/UL (ref 0–0.2)
BASOPHILS NFR BLD AUTO: 0 %
BILIRUB SERPL-MCNC: <0.2 MG/DL
BUN SERPL-MCNC: 9.2 MG/DL (ref 6–20)
CALCIUM SERPL-MCNC: 9 MG/DL (ref 8.6–10)
CHLORIDE SERPL-SCNC: 106 MMOL/L (ref 98–107)
CREAT SERPL-MCNC: 0.66 MG/DL (ref 0.51–0.95)
DEPRECATED HCO3 PLAS-SCNC: 21 MMOL/L (ref 22–29)
DIASTOLIC BLOOD PRESSURE - MUSE: NORMAL MMHG
EGFRCR SERPLBLD CKD-EPI 2021: >90 ML/MIN/1.73M2
EOSINOPHIL # BLD AUTO: 0.3 10E3/UL (ref 0–0.7)
EOSINOPHIL NFR BLD AUTO: 3 %
ERYTHROCYTE [DISTWIDTH] IN BLOOD BY AUTOMATED COUNT: 13.4 % (ref 10–15)
GLUCOSE SERPL-MCNC: 87 MG/DL (ref 70–99)
HCT VFR BLD AUTO: 34.7 % (ref 35–47)
HGB BLD-MCNC: 11.6 G/DL (ref 11.7–15.7)
IMM GRANULOCYTES # BLD: 0 10E3/UL
IMM GRANULOCYTES NFR BLD: 0 %
INTERPRETATION ECG - MUSE: NORMAL
LIPASE SERPL-CCNC: 48 U/L (ref 13–60)
LYMPHOCYTES # BLD AUTO: 3.5 10E3/UL (ref 0.8–5.3)
LYMPHOCYTES NFR BLD AUTO: 39 %
MAGNESIUM SERPL-MCNC: 2.2 MG/DL (ref 1.7–2.3)
MCH RBC QN AUTO: 27.8 PG (ref 26.5–33)
MCHC RBC AUTO-ENTMCNC: 33.4 G/DL (ref 31.5–36.5)
MCV RBC AUTO: 83 FL (ref 78–100)
MONOCYTES # BLD AUTO: 0.6 10E3/UL (ref 0–1.3)
MONOCYTES NFR BLD AUTO: 6 %
NEUTROPHILS # BLD AUTO: 4.6 10E3/UL (ref 1.6–8.3)
NEUTROPHILS NFR BLD AUTO: 52 %
NRBC # BLD AUTO: 0 10E3/UL
NRBC BLD AUTO-RTO: 0 /100
P AXIS - MUSE: 44 DEGREES
PLATELET # BLD AUTO: 206 10E3/UL (ref 150–450)
POTASSIUM SERPL-SCNC: 3.9 MMOL/L (ref 3.4–5.3)
PR INTERVAL - MUSE: 176 MS
PROT SERPL-MCNC: 6.7 G/DL (ref 6.4–8.3)
QRS DURATION - MUSE: 86 MS
QT - MUSE: 366 MS
QTC - MUSE: 437 MS
R AXIS - MUSE: 16 DEGREES
RBC # BLD AUTO: 4.18 10E6/UL (ref 3.8–5.2)
SODIUM SERPL-SCNC: 137 MMOL/L (ref 135–145)
SYSTOLIC BLOOD PRESSURE - MUSE: NORMAL MMHG
T AXIS - MUSE: 9 DEGREES
VENTRICULAR RATE- MUSE: 86 BPM
WBC # BLD AUTO: 9 10E3/UL (ref 4–11)

## 2024-03-20 PROCEDURE — 83690 ASSAY OF LIPASE: CPT

## 2024-03-20 PROCEDURE — 85025 COMPLETE CBC W/AUTO DIFF WBC: CPT

## 2024-03-20 PROCEDURE — 36415 COLL VENOUS BLD VENIPUNCTURE: CPT

## 2024-03-20 PROCEDURE — 250N000013 HC RX MED GY IP 250 OP 250 PS 637

## 2024-03-20 PROCEDURE — 83735 ASSAY OF MAGNESIUM: CPT

## 2024-03-20 PROCEDURE — 99283 EMERGENCY DEPT VISIT LOW MDM: CPT | Performed by: EMERGENCY MEDICINE

## 2024-03-20 PROCEDURE — 99284 EMERGENCY DEPT VISIT MOD MDM: CPT | Mod: FS | Performed by: EMERGENCY MEDICINE

## 2024-03-20 PROCEDURE — 80053 COMPREHEN METABOLIC PANEL: CPT

## 2024-03-20 PROCEDURE — 250N000009 HC RX 250

## 2024-03-20 RX ORDER — LIDOCAINE HYDROCHLORIDE 20 MG/ML
10 SOLUTION OROPHARYNGEAL ONCE
Status: COMPLETED | OUTPATIENT
Start: 2024-03-20 | End: 2024-03-20

## 2024-03-20 RX ORDER — MAGNESIUM HYDROXIDE/ALUMINUM HYDROXICE/SIMETHICONE 120; 1200; 1200 MG/30ML; MG/30ML; MG/30ML
15 SUSPENSION ORAL ONCE
Status: COMPLETED | OUTPATIENT
Start: 2024-03-20 | End: 2024-03-20

## 2024-03-20 RX ADMIN — ALUMINUM HYDROXIDE, MAGNESIUM HYDROXIDE, AND DIMETHICONE 15 ML: 200; 20; 200 SUSPENSION ORAL at 20:43

## 2024-03-20 RX ADMIN — LIDOCAINE HYDROCHLORIDE 10 ML: 20 SOLUTION ORAL at 20:43

## 2024-03-20 ASSESSMENT — ACTIVITIES OF DAILY LIVING (ADL)
ADLS_ACUITY_SCORE: 39

## 2024-03-21 ENCOUNTER — HOSPITAL ENCOUNTER (EMERGENCY)
Facility: HOSPITAL | Age: 24
Discharge: HOME | End: 2024-03-21
Attending: EMERGENCY MEDICINE | Admitting: EMERGENCY MEDICINE
Payer: MEDICARE

## 2024-03-21 VITALS
RESPIRATION RATE: 16 BRPM | HEART RATE: 89 BPM | WEIGHT: 238 LBS | BODY MASS INDEX: 42.17 KG/M2 | OXYGEN SATURATION: 100 % | HEIGHT: 63 IN | DIASTOLIC BLOOD PRESSURE: 94 MMHG | SYSTOLIC BLOOD PRESSURE: 150 MMHG | TEMPERATURE: 98.3 F

## 2024-03-21 DIAGNOSIS — Z76.5 DRUG-SEEKING BEHAVIOR: ICD-10-CM

## 2024-03-21 DIAGNOSIS — Z76.89 FREQUENT PATIENT IN EMERGENCY DEPARTMENT: ICD-10-CM

## 2024-03-21 DIAGNOSIS — R10.9 CHRONIC ABDOMINAL PAIN: ICD-10-CM

## 2024-03-21 DIAGNOSIS — G89.29 CHRONIC ABDOMINAL PAIN: ICD-10-CM

## 2024-03-21 LAB
ALBUMIN SERPL BCG-MCNC: 4.3 G/DL (ref 3.5–5.2)
ALP SERPL-CCNC: 62 U/L (ref 40–150)
ALT SERPL W P-5'-P-CCNC: 21 U/L (ref 0–50)
ANION GAP SERPL CALCULATED.3IONS-SCNC: 9 MMOL/L (ref 7–15)
AST SERPL W P-5'-P-CCNC: 15 U/L (ref 0–45)
BASOPHILS # BLD AUTO: 0 10E3/UL (ref 0–0.2)
BASOPHILS NFR BLD AUTO: 1 %
BILIRUB DIRECT SERPL-MCNC: <0.2 MG/DL (ref 0–0.3)
BILIRUB SERPL-MCNC: 0.3 MG/DL
BUN SERPL-MCNC: 8.8 MG/DL (ref 6–20)
CALCIUM SERPL-MCNC: 9.1 MG/DL (ref 8.6–10)
CHLORIDE SERPL-SCNC: 108 MMOL/L (ref 98–107)
CREAT SERPL-MCNC: 0.86 MG/DL (ref 0.51–0.95)
DEPRECATED HCO3 PLAS-SCNC: 25 MMOL/L (ref 22–29)
EGFRCR SERPLBLD CKD-EPI 2021: >90 ML/MIN/1.73M2
EOSINOPHIL # BLD AUTO: 0.2 10E3/UL (ref 0–0.7)
EOSINOPHIL NFR BLD AUTO: 2 %
ERYTHROCYTE [DISTWIDTH] IN BLOOD BY AUTOMATED COUNT: 13.4 % (ref 10–15)
GLUCOSE SERPL-MCNC: 72 MG/DL (ref 70–99)
HCG SERPL QL: NEGATIVE
HCT VFR BLD AUTO: 35 % (ref 35–47)
HGB BLD-MCNC: 11.7 G/DL (ref 11.7–15.7)
HOLD SPECIMEN: NORMAL
IMM GRANULOCYTES # BLD: 0 10E3/UL
IMM GRANULOCYTES NFR BLD: 0 %
LIPASE SERPL-CCNC: 46 U/L (ref 13–60)
LYMPHOCYTES # BLD AUTO: 2.8 10E3/UL (ref 0.8–5.3)
LYMPHOCYTES NFR BLD AUTO: 34 %
MCH RBC QN AUTO: 28 PG (ref 26.5–33)
MCHC RBC AUTO-ENTMCNC: 33.4 G/DL (ref 31.5–36.5)
MCV RBC AUTO: 84 FL (ref 78–100)
MONOCYTES # BLD AUTO: 0.4 10E3/UL (ref 0–1.3)
MONOCYTES NFR BLD AUTO: 5 %
NEUTROPHILS # BLD AUTO: 4.7 10E3/UL (ref 1.6–8.3)
NEUTROPHILS NFR BLD AUTO: 58 %
NRBC # BLD AUTO: 0 10E3/UL
NRBC BLD AUTO-RTO: 0 /100
PLATELET # BLD AUTO: 218 10E3/UL (ref 150–450)
POTASSIUM SERPL-SCNC: 3.8 MMOL/L (ref 3.4–5.3)
PROT SERPL-MCNC: 6.9 G/DL (ref 6.4–8.3)
RBC # BLD AUTO: 4.18 10E6/UL (ref 3.8–5.2)
SODIUM SERPL-SCNC: 142 MMOL/L (ref 135–145)
WBC # BLD AUTO: 8.1 10E3/UL (ref 4–11)

## 2024-03-21 PROCEDURE — 83690 ASSAY OF LIPASE: CPT | Performed by: EMERGENCY MEDICINE

## 2024-03-21 PROCEDURE — 85004 AUTOMATED DIFF WBC COUNT: CPT | Performed by: EMERGENCY MEDICINE

## 2024-03-21 PROCEDURE — 80076 HEPATIC FUNCTION PANEL: CPT | Performed by: EMERGENCY MEDICINE

## 2024-03-21 PROCEDURE — 99283 EMERGENCY DEPT VISIT LOW MDM: CPT

## 2024-03-21 PROCEDURE — 84703 CHORIONIC GONADOTROPIN ASSAY: CPT | Performed by: EMERGENCY MEDICINE

## 2024-03-21 PROCEDURE — 36415 COLL VENOUS BLD VENIPUNCTURE: CPT | Performed by: EMERGENCY MEDICINE

## 2024-03-21 PROCEDURE — 250N000013 HC RX MED GY IP 250 OP 250 PS 637: Performed by: EMERGENCY MEDICINE

## 2024-03-21 RX ORDER — MAGNESIUM HYDROXIDE/ALUMINUM HYDROXICE/SIMETHICONE 120; 1200; 1200 MG/30ML; MG/30ML; MG/30ML
15 SUSPENSION ORAL ONCE
Status: COMPLETED | OUTPATIENT
Start: 2024-03-21 | End: 2024-03-21

## 2024-03-21 RX ADMIN — ALUMINUM HYDROXIDE, MAGNESIUM HYDROXIDE, AND SIMETHICONE 15 ML: 1200; 120; 1200 SUSPENSION ORAL at 17:56

## 2024-03-21 ASSESSMENT — ACTIVITIES OF DAILY LIVING (ADL)
ADLS_ACUITY_SCORE: 37
ADLS_ACUITY_SCORE: 37

## 2024-03-21 NOTE — ED PROVIDER NOTES
ED Provider Note  [facility]      History     Chief Complaint   Patient presents with    Abdominal Pain     The history is provided by the patient and medical records. No  was used.     Sadaf Ross is a 23 year old female who presents to the ED with complaints of abdominal pain.  Past medical history notable for intellectual disability, borderline personality disorder, bipolar affective disorder, adjustment disorder, anxiety, constipation, and history of suicidal ideation.  She states that she began having symptoms of abdominal discomfort with radiation up into her chest after eating a grilled cheese sandwich this afternoon.  She describes the pain as a burning sensation that radiates up into her chest.  She denies any radiation of the pain up into her neck, jaw, or upper extremities.  She denies any radiation of the pain into her back.  She reports some nausea but denies any episodes of emesis.  She denies any diarrhea, melena, hematochezia.  She denies any urinary symptoms of dysuria, hematuria, urgency or frequency of urination.  She denies pregnancy.  She reports vape use but denies marijuana use or illicit drug use.  She denies alcohol use.  She states that this pain feels similar to her history of heartburn and gastric reflux.  She has not tried any over-the-counter medications for symptoms prior to arrival.    Past Medical History  Past Medical History:   Diagnosis Date    ADHD (attention deficit hyperactivity disorder)     Bipolar 1 disorder (H)     Borderline personality disorder (H)     Depression     Depressive disorder     Intellectual disability     Obesity     Syncope      Past Surgical History:   Procedure Laterality Date    APPENDECTOMY      APPENDECTOMY       acetaminophen (TYLENOL) 325 MG tablet  albuterol (PROAIR HFA/PROVENTIL HFA/VENTOLIN HFA) 108 (90 Base) MCG/ACT inhaler  ARIPiprazole lauroxil ER (ARISTADA) 882 MG/3.2ML  "intra-muscular  bisacodyl (DULCOLAX) 5 MG EC tablet  calcium carbonate (TUMS) 500 MG chewable tablet  cyclobenzaprine (FLEXERIL) 10 MG tablet  dicyclomine (BENTYL) 20 MG tablet  docusate sodium (COLACE) 50 MG capsule  famotidine (PEPCID) 20 MG tablet  FLUoxetine (PROZAC) 40 MG capsule  hydrocortisone, Perianal, (HYDROCORTISONE) 2.5 % cream  hydrOXYzine (ATARAX) 50 MG tablet  ibuprofen (ADVIL/MOTRIN) 200 MG tablet  loperamide (IMODIUM A-D) 2 MG tablet  LORazepam (ATIVAN) 0.5 MG tablet  mupirocin (BACTROBAN) 2 % external ointment  OLANZapine zydis (ZYPREXA) 5 MG ODT  ondansetron (ZOFRAN) 4 MG tablet  ondansetron (ZOFRAN) 4 MG tablet  pantoprazole (PROTONIX) 40 MG EC tablet  polyethylene glycol (MIRALAX) 17 GM/Dose powder  promethazine (PHENERGAN) 12.5 MG tablet  sennosides (SENOKOT) 8.6 MG tablet  traZODone (DESYREL) 50 MG tablet  Vitamin D, Cholecalciferol, 25 MCG (1000 UT) TABS      Allergies   Allergen Reactions    Penicillins Rash and Unknown     Family History  Family History   Problem Relation Age of Onset    Diabetes Type 1 Father     Cancer Paternal Grandfather      Social History   Social History     Tobacco Use    Smoking status: Some Days     Packs/day: 0.25     Years: 5.00     Additional pack years: 0.00     Total pack years: 1.25     Types: Cigarettes, Vaping Device    Smokeless tobacco: Never   Substance Use Topics    Alcohol use: No    Drug use: No      Past medical history, past surgical history, medications, allergies, family history, and social history were reviewed with the patient. No additional pertinent items.      A medically appropriate review of systems was performed with pertinent positives and negatives noted in the HPI, and all other systems negative.    Physical Exam   BP: 122/69  Pulse: 91  Temp: 98.2  F (36.8  C)  Resp: 18  Height: 160 cm (5' 3\")  Weight: 108 kg (238 lb)  SpO2: 99 %  Physical Exam  Constitutional:       General: She is not in acute distress.     Appearance: She is " well-developed. She is obese. She is not ill-appearing, toxic-appearing or diaphoretic.   HENT:      Mouth/Throat:      Mouth: Mucous membranes are moist.      Pharynx: Oropharynx is clear.   Cardiovascular:      Rate and Rhythm: Normal rate and regular rhythm.   Pulmonary:      Effort: Pulmonary effort is normal. No respiratory distress.      Breath sounds: Normal breath sounds.   Chest:      Chest wall: No tenderness.   Abdominal:      General: Abdomen is flat. Bowel sounds are normal. There is no distension.      Palpations: Abdomen is soft.      Tenderness: There is no abdominal tenderness. There is no guarding or rebound. Negative signs include Santana's sign, Rovsing's sign and McBurney's sign.   Skin:     General: Skin is warm.   Neurological:      Mental Status: She is alert.         ED Course, Procedures, & Data      Procedures               Results for orders placed or performed during the hospital encounter of 03/20/24   Comprehensive metabolic panel     Status: Abnormal   Result Value Ref Range    Sodium 137 135 - 145 mmol/L    Potassium 3.9 3.4 - 5.3 mmol/L    Carbon Dioxide (CO2) 21 (L) 22 - 29 mmol/L    Anion Gap 10 7 - 15 mmol/L    Urea Nitrogen 9.2 6.0 - 20.0 mg/dL    Creatinine 0.66 0.51 - 0.95 mg/dL    GFR Estimate >90 >60 mL/min/1.73m2    Calcium 9.0 8.6 - 10.0 mg/dL    Chloride 106 98 - 107 mmol/L    Glucose 87 70 - 99 mg/dL    Alkaline Phosphatase 62 40 - 150 U/L    AST 14 0 - 45 U/L    ALT 19 0 - 50 U/L    Protein Total 6.7 6.4 - 8.3 g/dL    Albumin 4.2 3.5 - 5.2 g/dL    Bilirubin Total <0.2 <=1.2 mg/dL   Lipase     Status: Normal   Result Value Ref Range    Lipase 48 13 - 60 U/L   Magnesium     Status: Normal   Result Value Ref Range    Magnesium 2.2 1.7 - 2.3 mg/dL   CBC with platelets and differential     Status: Abnormal   Result Value Ref Range    WBC Count 9.0 4.0 - 11.0 10e3/uL    RBC Count 4.18 3.80 - 5.20 10e6/uL    Hemoglobin 11.6 (L) 11.7 - 15.7 g/dL    Hematocrit 34.7 (L) 35.0 -  47.0 %    MCV 83 78 - 100 fL    MCH 27.8 26.5 - 33.0 pg    MCHC 33.4 31.5 - 36.5 g/dL    RDW 13.4 10.0 - 15.0 %    Platelet Count 206 150 - 450 10e3/uL    % Neutrophils 52 %    % Lymphocytes 39 %    % Monocytes 6 %    % Eosinophils 3 %    % Basophils 0 %    % Immature Granulocytes 0 %    NRBCs per 100 WBC 0 <1 /100    Absolute Neutrophils 4.6 1.6 - 8.3 10e3/uL    Absolute Lymphocytes 3.5 0.8 - 5.3 10e3/uL    Absolute Monocytes 0.6 0.0 - 1.3 10e3/uL    Absolute Eosinophils 0.3 0.0 - 0.7 10e3/uL    Absolute Basophils 0.0 0.0 - 0.2 10e3/uL    Absolute Immature Granulocytes 0.0 <=0.4 10e3/uL    Absolute NRBCs 0.0 10e3/uL   CBC with platelets differential     Status: Abnormal    Narrative    The following orders were created for panel order CBC with platelets differential.  Procedure                               Abnormality         Status                     ---------                               -----------         ------                     CBC with platelets and d...[[id number]]  Abnormal            Final result                 Please view results for these tests on the individual orders.     Medications   alum & mag hydroxide-simethicone (MAALOX) suspension 15 mL (15 mLs Oral $Given 3/20/24 2043)   lidocaine (viscous) (XYLOCAINE) 2 % solution 10 mL (10 mLs Mouth/Throat $Given 3/20/24 2043)     Labs Ordered and Resulted from Time of ED Arrival to Time of ED Departure   COMPREHENSIVE METABOLIC PANEL - Abnormal       Result Value    Sodium 137      Potassium 3.9      Carbon Dioxide (CO2) 21 (*)     Anion Gap 10      Urea Nitrogen 9.2      Creatinine 0.66      GFR Estimate >90      Calcium 9.0      Chloride 106      Glucose 87      Alkaline Phosphatase 62      AST 14      ALT 19      Protein Total 6.7      Albumin 4.2      Bilirubin Total <0.2     CBC WITH PLATELETS AND DIFFERENTIAL - Abnormal    WBC Count 9.0      RBC Count 4.18      Hemoglobin 11.6 (*)     Hematocrit 34.7 (*)     MCV 83      MCH 27.8      MCHC 33.4       RDW 13.4      Platelet Count 206      % Neutrophils 52      % Lymphocytes 39      % Monocytes 6      % Eosinophils 3      % Basophils 0      % Immature Granulocytes 0      NRBCs per 100 WBC 0      Absolute Neutrophils 4.6      Absolute Lymphocytes 3.5      Absolute Monocytes 0.6      Absolute Eosinophils 0.3      Absolute Basophils 0.0      Absolute Immature Granulocytes 0.0      Absolute NRBCs 0.0     LIPASE - Normal    Lipase 48     MAGNESIUM - Normal    Magnesium 2.2       No orders to display          Critical care was not performed.     Medical Decision Making      Assessment & Plan    Sadaf is a 23-year-old female that presented to the ED by EMS with complaints of abdominal discomfort with radiation into her chest.  Supple vital signs without any tachycardia, hypotension, fever, hypoxia.  Patient resting comfortably in the ED exam room on initial evaluation and throughout the ED visit in no acute distress and nontoxic-appearing.  No acute abdomen signs on palpation including guarding, rebound, rigidity.  Patient stated that the symptoms feel similar and mildly worse than her history of gastric reflux and heartburn.  P.o. GI cocktail given with improvement of symptoms.  Labs unremarkable within normal limits.  Suspect GERD exacerbation as etiology of patient's symptoms.  Recommend discharge back to group home with over-the-counter medication management of recurrent symptoms.  Strict return precautions given.  Advise follow-up with her PCP office for further evaluation, recheck of symptoms, and further medication management.  Advised plenty of fluids, bland diet, and avoidance of acidic, tomato-based, spicy, or caffeine containing foods as this can exacerbate symptoms.  Patient voiced understanding of the discharge treatment plan and all questions were answered to the best of provider ability prior to discharge from the ED.    I have reviewed the nursing notes. I have reviewed the findings, diagnosis, plan  and need for follow up with the patient.    New Prescriptions    No medications on file       Final diagnoses:   Heartburn     MARLEN Albarado  [facility] EMERGENCY DEPARTMENT  3/20/2024     Kristina Mosqueda PA-C  03/20/24 2132    ================    --    ED Attending Physician Attestation    I Saul Milner MD, cared for this patient with the Advanced Practice Provider (BETHANY). I have performed a history and physical examination of the patient independent of the BETHANY. I reviewed the BETHANY's documentation above and agree with the documented findings and plan of care. I personally provided a substantive portion of the care for this patient, including the complete Medical Decision Making. Please see the BETHANY's documentation for full details.    Summary of HPI, PE, ED Course   Exam and ED course notable for      comfortable, sleeping,  breathing easily.       Abdomen soft, nt/nd.         Medical Decision Making  The patient's presentation was of moderate complexity (an acute illness with systemic symptoms).    The patient's evaluation involved:  ordering and/or review of 3+ test(s) in this encounter (see separate area of note for details)    The patient's management necessitated moderate risk (limitations due to social determinants of health (group home resident )).      Assessment / plan   Patient reports abdominal pain after eating.    No fever or unstable vital signs.  Differential diagnosis includes heartburn or GERD.  Will rule out other causes of acute abdominal pain including pancreatitis though unlikely given patient's frequent visits and laboratory work   - labs, reevaluate    10 PM patient sleeping comfortably.  Informed of reassuring labs.  Will discharge to group home      Saul Milner MD  Emergency Medicine          Saul Milner MD  03/20/24 3315

## 2024-03-21 NOTE — ED NOTES
Bed: ED05  Expected date:   Expected time:   Means of arrival:   Comments:  Fadi 7[age] y/o F abd. pain

## 2024-03-21 NOTE — ED TRIAGE NOTES
Patient arrives by EMS from [facility]. Patient complains of abdominal pain, VS WDL for EMS. Patient is tired from being up all night at the hospital last night. Denies pregnancy, denies N/V/D.

## 2024-03-21 NOTE — DISCHARGE INSTRUCTIONS
Continue utilizing Tums as needed for recurrence of heartburn symptoms  Continue to utilize senna for your bowel movements  Continue to do plenty of fluids and diet as tolerated and try to avoid acidic and tomato-based foods as well as caffeine and soda as these foods can exacerbate symptoms  Follow-up with your PCP office this week for reevaluation, recheck of your symptoms, discussion of other prescription medication options for recurrent symptoms

## 2024-03-21 NOTE — ED PROVIDER NOTES
EMERGENCY DEPARTMENT ENCOUNTER      NAME: Sadaf Ross  AGE: 23 year old female  YOB: 2000  MRN: [id number]  EVALUATION DATE & TIME: No admission date for patient encounter.    PCP: [facility]    ED PROVIDER: Elisabeth Kwok M.D.      Chief Complaint   Patient presents with    Constipation    Abdominal Pain         FINAL IMPRESSION:  1. Chronic abdominal pain    2. Drug-seeking behavior    3. Frequent patient in emergency department          ED COURSE & MEDICAL DECISION MAKING:     Patient wtih chronic abdominal pain, no rebound/guarding or Santana's sign, here in the ED again this week for the fourth time with chronic abdominal pain again, amenable to start taking her medications and f/u with GI as already scheduled. Patient discharged after being provided with extensive anticipatory guidance and given return precautions, importance of PMD follow-up emphasized.     Pertinent Labs & Imaging studies reviewed. (See chart for details)    N95 worn  A face shield was worn also  COVID PPE    Medical Decision Making  Obtained supplemental history:Supplemental history obtained?: No  Reviewed external records: External records reviewed?: Documented in chart  Care impacted by chronic illness:Chronic Pain and Mental Health  Care significantly affected by social determinants of health:N/A  Did you consider but not order tests?: Work up considered but not performed and documented in chart, if applicable  Did you interpret images independently?: Independent interpretation of ECG and images noted in documentation, when applicable.  Consultation discussion with other provider:Did you involve another provider (consultant, MH, pharmacy, etc.)?: No  Discharge. I recommended the patient continue their current prescription strength medication(s): pepcid. See documentation for any additional details.    At the conclusion of the encounter I discussed the results of all of the tests and the disposition. The questions  were answered. The patient or family acknowledged understanding and was agreeable with the care plan.     MEDICATIONS GIVEN IN THE EMERGENCY:  Medications   alum & mag hydroxide-simethicone (MAALOX) suspension 15 mL (15 mLs Oral $Given 3/21/24 2831)       NEW PRESCRIPTIONS STARTED AT TODAY'S ER VISIT  New Prescriptions    No medications on file          =================================================================    HPI      Sadaf Ross is a 23 year old female with PMHx of remote appendectomy, chronic abdominal pain, borderline personality disorder, bipolar disorder, depression, intellectual disability and group home resident with frequent ED visits and care plan who presents to the ED today via private vehicle with abdominal pain.    Per my chart review, patient went to a different ED  yesterday ([facility] ED) with epigastric abdominal pain like prior heartburn and gastric reflux, had not tried any over the counter or prescribed medications for this. LFTs, lipase, chemistry, CBC and magnesium were normal. UA without UTI present. She had normal vital signs, no acute abdominal signs on palpation , and improved after GI cocktail. She was discharged back to her group home with plan to continue over the counter medication management of chronic gastritis like discomfort, and referred to follow up with her primary care physician for serial reassessment and reevaluation. She was also in another ED yesterday for chronic knee pain, same 2 days ago, another ED with generalized abdominal pain 3/17 (4 days ago) also.    She reports years of ongoing abdominal pain unchanged, severe and epigastric, nonradiating, not better or worse with anything, no medications taken, no fever, no nasuea/vomiting/diarrhea. She has loose stools chronically and feels also chronically constipated. She has a follow up appointment with her GI specialist in 3 days on 3/24. No change today.      REVIEW OF SYSTEMS   All other systems reviewed and  are negative except as noted above in HPI.    PAST MEDICAL HISTORY:  Past Medical History:   Diagnosis Date    ADHD (attention deficit hyperactivity disorder)     Bipolar 1 disorder (H)     Borderline personality disorder (H)     Depression     Depressive disorder     Intellectual disability     Obesity     Syncope        PAST SURGICAL HISTORY:  Past Surgical History:   Procedure Laterality Date    APPENDECTOMY      APPENDECTOMY         CURRENT MEDICATIONS:    acetaminophen (TYLENOL) 325 MG tablet  albuterol (PROAIR HFA/PROVENTIL HFA/VENTOLIN HFA) 108 (90 Base) MCG/ACT inhaler  ARIPiprazole lauroxil ER (ARISTADA) 882 MG/3.2ML intra-muscular  bisacodyl (DULCOLAX) 5 MG EC tablet  calcium carbonate (TUMS) 500 MG chewable tablet  cyclobenzaprine (FLEXERIL) 10 MG tablet  dicyclomine (BENTYL) 20 MG tablet  docusate sodium (COLACE) 50 MG capsule  famotidine (PEPCID) 20 MG tablet  FLUoxetine (PROZAC) 40 MG capsule  hydrocortisone, Perianal, (HYDROCORTISONE) 2.5 % cream  hydrOXYzine (ATARAX) 50 MG tablet  ibuprofen (ADVIL/MOTRIN) 200 MG tablet  loperamide (IMODIUM A-D) 2 MG tablet  LORazepam (ATIVAN) 0.5 MG tablet  mupirocin (BACTROBAN) 2 % external ointment  OLANZapine zydis (ZYPREXA) 5 MG ODT  ondansetron (ZOFRAN) 4 MG tablet  ondansetron (ZOFRAN) 4 MG tablet  pantoprazole (PROTONIX) 40 MG EC tablet  polyethylene glycol (MIRALAX) 17 GM/Dose powder  promethazine (PHENERGAN) 12.5 MG tablet  sennosides (SENOKOT) 8.6 MG tablet  traZODone (DESYREL) 50 MG tablet  Vitamin D, Cholecalciferol, 25 MCG (1000 UT) TABS        ALLERGIES:  Allergies   Allergen Reactions    Penicillins Rash and Unknown       FAMILY HISTORY:  Family History   Problem Relation Age of Onset    Diabetes Type 1 Father     Cancer Paternal Grandfather        SOCIAL HISTORY:   Social History     Socioeconomic History    Marital status: Single   Tobacco Use    Smoking status: Some Days     Packs/day: 0.25     Years: 5.00     Additional pack years: 0.00     Total  "pack years: 1.25     Types: Cigarettes, Vaping Device    Smokeless tobacco: Never   Substance and Sexual Activity    Alcohol use: No    Drug use: No    Sexual activity: Not Currently     Partners: Female, Male     Birth control/protection: Pill, Injection       VITALS:  Patient Vitals for the past 24 hrs:   BP Temp Temp src Pulse Resp SpO2 Height Weight   03/21/24 1734 -- -- -- -- -- -- 1.6 m (5' 3\") 108 kg (238 lb)   03/21/24 1732 (!) 150/94 98.3  F (36.8  C) Temporal 89 16 100 % -- --       PHYSICAL EXAM    GENERAL: Awake, alert.  In no acute distress.   HEENT: Normocephalic, atraumatic.  Pupils equal, round and reactive.  Conjunctiva normal.  EOMI.  NECK: No stridor or apparent deformity.  PULMONARY: Symmetrical breath sounds without distress.  Lungs clear to auscultation bilaterally without wheezes, rhonchi or rales.  CARDIO: Regular rate and rhythm.  No significant murmur, rub or gallop.  Radial pulses strong and symmetrical.  ABDOMINAL: Abdomen soft, non-distended and non-tender to palpation.  No CVAT, no palpable hepatosplenomegaly.  EXTREMITIES: No lower extremity swelling or edema.    NEURO: Alert and oriented to person, place and time.  Cranial nerves grossly intact.  No focal motor deficit.  PSYCH: Normal mood and affect  SKIN: No rashes      LAB:  All pertinent labs reviewed and interpreted.  Results for orders placed or performed during the hospital encounter of 03/21/24   Basic metabolic panel   Result Value Ref Range    Sodium 142 135 - 145 mmol/L    Potassium 3.8 3.4 - 5.3 mmol/L    Chloride 108 (H) 98 - 107 mmol/L    Carbon Dioxide (CO2) 25 22 - 29 mmol/L    Anion Gap 9 7 - 15 mmol/L    Urea Nitrogen 8.8 6.0 - 20.0 mg/dL    Creatinine 0.86 0.51 - 0.95 mg/dL    GFR Estimate >90 >60 mL/min/1.73m2    Calcium 9.1 8.6 - 10.0 mg/dL    Glucose 72 70 - 99 mg/dL   Hepatic function panel   Result Value Ref Range    Protein Total 6.9 6.4 - 8.3 g/dL    Albumin 4.3 3.5 - 5.2 g/dL    Bilirubin Total 0.3 <=1.2 " mg/dL    Alkaline Phosphatase 62 40 - 150 U/L    AST 15 0 - 45 U/L    ALT 21 0 - 50 U/L    Bilirubin Direct <0.20 0.00 - 0.30 mg/dL   Result Value Ref Range    Lipase 46 13 - 60 U/L   HCG qualitative Blood   Result Value Ref Range    hCG Serum Qualitative Negative Negative   CBC with platelets and differential   Result Value Ref Range    WBC Count 8.1 4.0 - 11.0 10e3/uL    RBC Count 4.18 3.80 - 5.20 10e6/uL    Hemoglobin 11.7 11.7 - 15.7 g/dL    Hematocrit 35.0 35.0 - 47.0 %    MCV 84 78 - 100 fL    MCH 28.0 26.5 - 33.0 pg    MCHC 33.4 31.5 - 36.5 g/dL    RDW 13.4 10.0 - 15.0 %    Platelet Count 218 150 - 450 10e3/uL    % Neutrophils 58 %    % Lymphocytes 34 %    % Monocytes 5 %    % Eosinophils 2 %    % Basophils 1 %    % Immature Granulocytes 0 %    NRBCs per 100 WBC 0 <1 /100    Absolute Neutrophils 4.7 1.6 - 8.3 10e3/uL    Absolute Lymphocytes 2.8 0.8 - 5.3 10e3/uL    Absolute Monocytes 0.4 0.0 - 1.3 10e3/uL    Absolute Eosinophils 0.2 0.0 - 0.7 10e3/uL    Absolute Basophils 0.0 0.0 - 0.2 10e3/uL    Absolute Immature Granulocytes 0.0 <=0.4 10e3/uL    Absolute NRBCs 0.0 10e3/uL        Elisabeth Kwok MD  03/21/24 1329

## 2024-03-21 NOTE — ED TRIAGE NOTES
The patient states that she has periumbilical radiates epigastric. Pt states she has been seen By GI MD and reports there is nothing they can do. Pt states she is constipated, straining and stool and she has diarrhea. Pt takes senna and tums for her heartburn. Denies any urinary sx's.      Triage Assessment (Adult)       Row Name 03/21/24 8966          Triage Assessment    Airway WDL WDL        Respiratory WDL    Respiratory WDL WDL        Skin Circulation/Temperature WDL    Skin Circulation/Temperature WDL WDL        Cardiac WDL    Cardiac WDL WDL        Peripheral/Neurovascular WDL    Peripheral Neurovascular WDL pulse assessment        Cognitive/Neuro/Behavioral WDL    Cognitive/Neuro/Behavioral WDL WDL

## 2024-03-24 ENCOUNTER — HOSPITAL ENCOUNTER (EMERGENCY)
Facility: CLINIC | Age: 24
Discharge: HOME | End: 2024-03-24
Attending: EMERGENCY MEDICINE | Admitting: EMERGENCY MEDICINE
Payer: MEDICARE

## 2024-03-24 VITALS
OXYGEN SATURATION: 98 % | TEMPERATURE: 98.7 F | SYSTOLIC BLOOD PRESSURE: 110 MMHG | RESPIRATION RATE: 18 BRPM | DIASTOLIC BLOOD PRESSURE: 69 MMHG | HEART RATE: 92 BPM

## 2024-03-24 DIAGNOSIS — F39 MOOD DISORDER (H): ICD-10-CM

## 2024-03-24 PROCEDURE — 99283 EMERGENCY DEPT VISIT LOW MDM: CPT | Performed by: EMERGENCY MEDICINE

## 2024-03-25 NOTE — ED NOTES
Pt reports that her anxiety is resolved and that she feels like she should be able to leave soon. Denies any desire for PRN meds for her anxiety.

## 2024-03-25 NOTE — ED TRIAGE NOTES
"BIBA. Increased activity and yelling at group home, pt started experiencing \"panic attack\" at 1848, symptoms of the panic attack resolved on the ride to the hospital.      Triage Assessment (Adult)       Row Name 03/24/24 2016          Triage Assessment    Airway WDL WDL        Respiratory WDL    Respiratory WDL WDL        Skin Circulation/Temperature WDL    Skin Circulation/Temperature WDL WDL        Cardiac WDL    Cardiac WDL WDL        Peripheral/Neurovascular WDL    Peripheral Neurovascular WDL WDL        Cognitive/Neuro/Behavioral WDL    Cognitive/Neuro/Behavioral WDL WDL                     "

## 2024-03-25 NOTE — ED NOTES
Bed: ED11  Expected date: 3/24/24  Expected time: 7:48 PM  Means of arrival:   Comments:  N729, anxiety

## 2024-03-25 NOTE — ED PROVIDER NOTES
"ED Provider Note  [facility]      History     Chief Complaint   Patient presents with    Panic Attack     BIBA. Increased activity and yelling at group home, pt started experiencing \"panic attack\" at 1848, symptoms of the panic attack resolved on the ride to the hospital.     HPI  22yo F pmhx bipolar disorder, borderline personality disorder, intellectual disability biba from  for \"panic attack.\"  Patient is well-known to this department.  By time of arrival to the ED, patient states that her symptoms are resolved, and she can return back to her group home.  She states that she was initially made anxious by her roommate who was being loud.  Denies SI or HI.  States that she does not need medication at this time.  Denies acute complaints.    Past Medical History  Past Medical History:   Diagnosis Date    ADHD (attention deficit hyperactivity disorder)     Bipolar 1 disorder (H)     Borderline personality disorder (H)     Depression     Depressive disorder     Intellectual disability     Obesity     Syncope      Past Surgical History:   Procedure Laterality Date    APPENDECTOMY      APPENDECTOMY       acetaminophen (TYLENOL) 325 MG tablet  albuterol (PROAIR HFA/PROVENTIL HFA/VENTOLIN HFA) 108 (90 Base) MCG/ACT inhaler  ARIPiprazole lauroxil ER (ARISTADA) 882 MG/3.2ML intra-muscular  bisacodyl (DULCOLAX) 5 MG EC tablet  calcium carbonate (TUMS) 500 MG chewable tablet  cyclobenzaprine (FLEXERIL) 10 MG tablet  dicyclomine (BENTYL) 20 MG tablet  docusate sodium (COLACE) 50 MG capsule  famotidine (PEPCID) 20 MG tablet  FLUoxetine (PROZAC) 40 MG capsule  hydrocortisone, Perianal, (HYDROCORTISONE) 2.5 % cream  hydrOXYzine (ATARAX) 50 MG tablet  ibuprofen (ADVIL/MOTRIN) 200 MG tablet  loperamide (IMODIUM A-D) 2 MG tablet  LORazepam (ATIVAN) 0.5 MG tablet  mupirocin (BACTROBAN) 2 % external ointment  OLANZapine zydis (ZYPREXA) 5 MG ODT  ondansetron (ZOFRAN) 4 MG tablet  ondansetron (ZOFRAN) 4 MG " tablet  pantoprazole (PROTONIX) 40 MG EC tablet  polyethylene glycol (MIRALAX) 17 GM/Dose powder  promethazine (PHENERGAN) 12.5 MG tablet  sennosides (SENOKOT) 8.6 MG tablet  traZODone (DESYREL) 50 MG tablet  Vitamin D, Cholecalciferol, 25 MCG (1000 UT) TABS      Allergies   Allergen Reactions    Penicillins Rash and Unknown     Family History  Family History   Problem Relation Age of Onset    Diabetes Type 1 Father     Cancer Paternal Grandfather      Social History   Social History     Tobacco Use    Smoking status: Some Days     Packs/day: 0.25     Years: 5.00     Additional pack years: 0.00     Total pack years: 1.25     Types: Cigarettes, Vaping Device    Smokeless tobacco: Never   Substance Use Topics    Alcohol use: No    Drug use: No         A medically appropriate review of systems was performed with pertinent positives and negatives noted in the HPI, and all other systems negative.    Physical Exam   BP: 110/69  Pulse: 92  Temp: 98.7  F (37.1  C)  Resp: 18  SpO2: 98 %  Physical Exam  Constitutional:       General: She is not in acute distress.     Appearance: Normal appearance. She is well-developed.   HENT:      Head: Normocephalic and atraumatic.   Eyes:      Conjunctiva/sclera: Conjunctivae normal.   Cardiovascular:      Rate and Rhythm: Normal rate.   Pulmonary:      Effort: Pulmonary effort is normal.   Musculoskeletal:      Cervical back: Normal range of motion and neck supple.   Skin:     General: Skin is warm and dry.   Neurological:      Mental Status: She is alert and oriented to person, place, and time.   Psychiatric:         Behavior: Behavior is cooperative.         Thought Content: Thought content does not include homicidal or suicidal ideation. Thought content does not include homicidal or suicidal plan.           ED Course, Procedures, & Data      Procedures               No results found for any visits on 03/24/24.  Medications - No data to display  Labs Ordered and Resulted from Time of  "ED Arrival to Time of ED Departure - No data to display  No orders to display          Critical care was not performed.     Medical Decision Making  The patient's presentation was of moderate complexity (a chronic illness mild to moderate exacerbation, progression, or side effect of treatment).    The patient's evaluation involved:  review of external note(s) from 3+ sources (prior ED)    The patient's management necessitated moderate risk (limitations due to social determinants of health (mental health.)).    Assessment & Plan    24yo F pmhx bipolar disorder, borderline personality disorder, intellectual disability biba from  for \"panic attack.\"  Patient is well-known to this department.  By time of arrival to the ED, patient states that her symptoms are resolved, and she can return back to her group home.  She states that she was initially made anxious by her roommate who was being loud.  Denies SI or HI.  States that she does not need medication at this time.  Denies acute complaints.    Patient in no acute distress, she is calm and cooperative.  Given no acute concerns, and care plan that recommends prompt disposition of the ED, patient will be discharged back to her group home.  Advised follow-up with her care team, ER return precautions given.    I have reviewed the nursing notes. I have reviewed the findings, diagnosis, plan and need for follow up with the patient.    New Prescriptions    No medications on file       Final diagnoses:   Mood disorder (H24)         Dexter Saunders PA-C  [facility] EMERGENCY DEPARTMENT  3/24/2024     --    ED Attending Physician Attestation    I Ghassan Browning DO, cared for this patient with the Advanced Practice Provider (BETHANY). I have performed a history and physical examination of the patient independent of the BETHANY. I reviewed the BETHANY's documentation above and agree with the documented findings and plan of care. I personally provided a substantive portion of " the care for this patient, including the complete Medical Decision Making. Please see the BETHANY's documentation for full details.    Summary of HPI, PE, ED Course   Patient is a 23 year old female evaluated in the emergency department for anxiety, now resolved. Exam and ED course notable for well appearing, requesting discharge. After the completion of care in the emergency department, the patient was discharged.      Ghassan Browning,   Emergency Medicine         Ghassan Browning,   03/24/24 8555

## 2024-03-30 ENCOUNTER — HOSPITAL ENCOUNTER (EMERGENCY)
Facility: CLINIC | Age: 24
Discharge: HOME | End: 2024-03-30
Attending: EMERGENCY MEDICINE | Admitting: EMERGENCY MEDICINE
Payer: MEDICARE

## 2024-03-30 VITALS
HEART RATE: 96 BPM | RESPIRATION RATE: 16 BRPM | DIASTOLIC BLOOD PRESSURE: 74 MMHG | OXYGEN SATURATION: 100 % | SYSTOLIC BLOOD PRESSURE: 117 MMHG

## 2024-03-30 DIAGNOSIS — R45.851 SUICIDAL IDEATION: ICD-10-CM

## 2024-03-30 PROCEDURE — 99282 EMERGENCY DEPT VISIT SF MDM: CPT | Performed by: EMERGENCY MEDICINE

## 2024-03-30 PROCEDURE — 99283 EMERGENCY DEPT VISIT LOW MDM: CPT | Performed by: EMERGENCY MEDICINE

## 2024-03-30 ASSESSMENT — ACTIVITIES OF DAILY LIVING (ADL): ADLS_ACUITY_SCORE: 39

## 2024-03-31 ENCOUNTER — HOSPITAL ENCOUNTER (EMERGENCY)
Facility: CLINIC | Age: 24
Discharge: HOME | End: 2024-03-31
Attending: EMERGENCY MEDICINE | Admitting: EMERGENCY MEDICINE
Payer: MEDICARE

## 2024-03-31 ENCOUNTER — NURSE TRIAGE (OUTPATIENT)
Dept: NURSING | Facility: CLINIC | Age: 24
End: 2024-03-31
Payer: MEDICARE

## 2024-03-31 VITALS
OXYGEN SATURATION: 98 % | TEMPERATURE: 98.1 F | DIASTOLIC BLOOD PRESSURE: 74 MMHG | SYSTOLIC BLOOD PRESSURE: 124 MMHG | HEART RATE: 80 BPM | RESPIRATION RATE: 16 BRPM

## 2024-03-31 DIAGNOSIS — R45.851 SUICIDAL IDEATION: ICD-10-CM

## 2024-03-31 PROCEDURE — 99285 EMERGENCY DEPT VISIT HI MDM: CPT | Performed by: EMERGENCY MEDICINE

## 2024-03-31 PROCEDURE — 99283 EMERGENCY DEPT VISIT LOW MDM: CPT | Performed by: EMERGENCY MEDICINE

## 2024-03-31 ASSESSMENT — COLUMBIA-SUICIDE SEVERITY RATING SCALE - C-SSRS
6. HAVE YOU EVER DONE ANYTHING, STARTED TO DO ANYTHING, OR PREPARED TO DO ANYTHING TO END YOUR LIFE?: YES
2. HAVE YOU ACTUALLY HAD ANY THOUGHTS OF KILLING YOURSELF IN THE PAST MONTH?: YES
1. IN THE PAST MONTH, HAVE YOU WISHED YOU WERE DEAD OR WISHED YOU COULD GO TO SLEEP AND NOT WAKE UP?: YES
5. HAVE YOU STARTED TO WORK OUT OR WORKED OUT THE DETAILS OF HOW TO KILL YOURSELF? DO YOU INTEND TO CARRY OUT THIS PLAN?: YES
3. HAVE YOU BEEN THINKING ABOUT HOW YOU MIGHT KILL YOURSELF?: YES
4. HAVE YOU HAD THESE THOUGHTS AND HAD SOME INTENTION OF ACTING ON THEM?: YES

## 2024-03-31 ASSESSMENT — ACTIVITIES OF DAILY LIVING (ADL): ADLS_ACUITY_SCORE: 39

## 2024-03-31 NOTE — ED NOTES
Bed: [facility]REDH-H  Expected date:   Expected time:   Means of arrival:   Comments:  Fadi 726 24 y/o F SI

## 2024-03-31 NOTE — ED PROVIDER NOTES
[facility] [place] EMERGENCY DEPARTMENT ([facility])    3/30/24      ED PROVIDER NOTE   History     Chief Complaint   Patient presents with     Suicidal     Pt called 911 from  for SI, pt was waiting outside with bag packed when EMS arrived. Pt also c/o racing thoughts and diarrhea.     HPI  Sadaf Ross is a 23 year old female with a past medical history of bipolar disorder, borderline personality disorder, and intellectual disability who presents to the ED from  for evaluation of suicidal ideation. Patient called 911 due to suicidal ideation. Patient was waiting outside with bag packed when EMS arrived.  Patient states she has had suicidal ideation for the past 2 hours.  No known precipitating factors.  Patient states that she plans to kill herself by biting herself.  She has been biting her hand.  No other recent self-harm.  No other symptoms noted.    Past Medical History  Past Medical History:   Diagnosis Date     ADHD (attention deficit hyperactivity disorder)      Bipolar 1 disorder (H)      Borderline personality disorder (H)      Depression      Depressive disorder      Intellectual disability      Obesity      Syncope      Past Surgical History:   Procedure Laterality Date     APPENDECTOMY       APPENDECTOMY       acetaminophen (TYLENOL) 325 MG tablet  albuterol (PROAIR HFA/PROVENTIL HFA/VENTOLIN HFA) 108 (90 Base) MCG/ACT inhaler  ARIPiprazole lauroxil ER (ARISTADA) 882 MG/3.2ML intra-muscular  bisacodyl (DULCOLAX) 5 MG EC tablet  calcium carbonate (TUMS) 500 MG chewable tablet  cyclobenzaprine (FLEXERIL) 10 MG tablet  dicyclomine (BENTYL) 20 MG tablet  docusate sodium (COLACE) 50 MG capsule  famotidine (PEPCID) 20 MG tablet  FLUoxetine (PROZAC) 40 MG capsule  hydrocortisone, Perianal, (HYDROCORTISONE) 2.5 % cream  hydrOXYzine (ATARAX) 50 MG tablet  ibuprofen (ADVIL/MOTRIN) 200 MG tablet  loperamide (IMODIUM A-D) 2 MG tablet  LORazepam (ATIVAN) 0.5 MG tablet  mupirocin (BACTROBAN) 2 % external  ointment  OLANZapine zydis (ZYPREXA) 5 MG ODT  ondansetron (ZOFRAN) 4 MG tablet  ondansetron (ZOFRAN) 4 MG tablet  pantoprazole (PROTONIX) 40 MG EC tablet  polyethylene glycol (MIRALAX) 17 GM/Dose powder  promethazine (PHENERGAN) 12.5 MG tablet  sennosides (SENOKOT) 8.6 MG tablet  traZODone (DESYREL) 50 MG tablet  Vitamin D, Cholecalciferol, 25 MCG (1000 UT) TABS      Allergies   Allergen Reactions     Penicillins Rash and Unknown     Family History  Family History   Problem Relation Age of Onset     Diabetes Type 1 Father      Cancer Paternal Grandfather      Social History   Social History     Tobacco Use     Smoking status: Some Days     Packs/day: 0.25     Years: 5.00     Additional pack years: 0.00     Total pack years: 1.25     Types: Cigarettes, Vaping Device     Smokeless tobacco: Never   Substance Use Topics     Alcohol use: No     Drug use: No         A medically appropriate review of systems was performed with pertinent positives and negatives noted in the HPI, and all other systems negative.    Physical Exam   BP: 117/74  Pulse: 96  Resp: 16  SpO2: 100 %  Physical Exam  Constitutional:       General: She is not in acute distress.     Appearance: Normal appearance. She is not diaphoretic.   HENT:      Head: Atraumatic.      Mouth/Throat:      Mouth: Mucous membranes are moist.   Eyes:      General: No scleral icterus.     Conjunctiva/sclera: Conjunctivae normal.   Cardiovascular:      Rate and Rhythm: Normal rate.      Heart sounds: Normal heart sounds.   Pulmonary:      Effort: No respiratory distress.      Breath sounds: Normal breath sounds.   Abdominal:      General: Abdomen is flat.   Musculoskeletal:      Cervical back: Neck supple.   Skin:     General: Skin is warm.      Findings: No rash.   Neurological:      Mental Status: She is alert.   Psychiatric:         Attention and Perception: Attention normal.         Speech: Speech normal.         Behavior: Behavior is cooperative.         Thought  Content: Thought content includes suicidal ideation. Thought content includes suicidal plan.         ED Course, Procedures, & Data      Procedures               No results found for any visits on 03/30/24.  Medications - No data to display  Labs Ordered and Resulted from Time of ED Arrival to Time of ED Departure - No data to display  No orders to display              Assessment & Plan    Is a 23-year-old female who presents to EMS from group home with suicidal ideation.  Patient notes suicidal ideation for the past 2 hours with plans to bite herself.  Patient shows me bite marks on her hand.  They are superficial with no bleeding.  Patient has a history of frequent presentations to this emergency department with similar presentations.  Per patient's care plan is recommended patient be discharged back to group home if there is no acute changes.  This is chronic presentation for patient.  We will return patient to group home per care plan.    I have reviewed the nursing notes. I have reviewed the findings, diagnosis, plan and need for follow up with the patient.    New Prescriptions    No medications on file       Final diagnoses:   None       Ramo Sawant DO  [facility] EMERGENCY DEPARTMENT  3/30/2024     Ramo Sawant DO  03/31/24 0123

## 2024-04-01 ENCOUNTER — NURSE TRIAGE (OUTPATIENT)
Dept: FAMILY MEDICINE | Facility: OTHER | Age: 24
End: 2024-04-01
Payer: MEDICARE

## 2024-04-01 ENCOUNTER — HOSPITAL ENCOUNTER (EMERGENCY)
Facility: CLINIC | Age: 24
Discharge: HOME | End: 2024-04-01
Admitting: PHYSICIAN ASSISTANT
Payer: MEDICARE

## 2024-04-01 ENCOUNTER — HOSPITAL ENCOUNTER (EMERGENCY)
Facility: CLINIC | Age: 24
Discharge: HOME | End: 2024-04-01
Attending: EMERGENCY MEDICINE
Payer: MEDICARE

## 2024-04-01 VITALS
RESPIRATION RATE: 16 BRPM | HEART RATE: 103 BPM | TEMPERATURE: 98 F | DIASTOLIC BLOOD PRESSURE: 85 MMHG | OXYGEN SATURATION: 100 % | SYSTOLIC BLOOD PRESSURE: 142 MMHG

## 2024-04-01 VITALS
TEMPERATURE: 98 F | HEART RATE: 95 BPM | OXYGEN SATURATION: 99 % | SYSTOLIC BLOOD PRESSURE: 128 MMHG | DIASTOLIC BLOOD PRESSURE: 75 MMHG | RESPIRATION RATE: 16 BRPM

## 2024-04-01 DIAGNOSIS — R45.851 SUICIDAL IDEATION: ICD-10-CM

## 2024-04-01 DIAGNOSIS — M79.10 MUSCULAR PAIN: ICD-10-CM

## 2024-04-01 PROCEDURE — 99283 EMERGENCY DEPT VISIT LOW MDM: CPT | Performed by: PHYSICIAN ASSISTANT

## 2024-04-01 PROCEDURE — 99281 EMR DPT VST MAYX REQ PHY/QHP: CPT | Performed by: EMERGENCY MEDICINE

## 2024-04-01 PROCEDURE — 99283 EMERGENCY DEPT VISIT LOW MDM: CPT | Performed by: EMERGENCY MEDICINE

## 2024-04-01 PROCEDURE — 99282 EMERGENCY DEPT VISIT SF MDM: CPT | Performed by: EMERGENCY MEDICINE

## 2024-04-01 ASSESSMENT — ACTIVITIES OF DAILY LIVING (ADL): ADLS_ACUITY_SCORE: 39

## 2024-04-01 NOTE — ED TRIAGE NOTES
Pt biba from [facility] for SI with a plan to bite self     Triage Assessment (Adult)       Row Name 03/31/24 1924          Triage Assessment    Airway WDL WDL        Respiratory WDL    Respiratory WDL WDL        Skin Circulation/Temperature WDL    Skin Circulation/Temperature WDL WDL        Cardiac WDL    Cardiac WDL WDL        Peripheral/Neurovascular WDL    Peripheral Neurovascular WDL WDL        Cognitive/Neuro/Behavioral WDL    Cognitive/Neuro/Behavioral WDL WDL

## 2024-04-01 NOTE — ED PROVIDER NOTES
ED Provider Note  [facility]      History     Chief Complaint   Patient presents with    Suicidal    Leg Pain     HPI  Sadaf Rsos is a 23 year old female with a past medical history of bipolar disorder, borderline personality disorder, and intellectual disability who is well-known to the ED and presenting via EMS for left lower leg pain. She says it has been bothering her since yesterday.  She denies trauma. No pain in the back of the leg.  She is not on birth control.      Past Medical History  Past Medical History:   Diagnosis Date    ADHD (attention deficit hyperactivity disorder)     Bipolar 1 disorder (H)     Borderline personality disorder (H)     Depression     Depressive disorder     Intellectual disability     Obesity     Syncope      Past Surgical History:   Procedure Laterality Date    APPENDECTOMY      APPENDECTOMY       acetaminophen (TYLENOL) 325 MG tablet  albuterol (PROAIR HFA/PROVENTIL HFA/VENTOLIN HFA) 108 (90 Base) MCG/ACT inhaler  ARIPiprazole lauroxil ER (ARISTADA) 882 MG/3.2ML intra-muscular  bisacodyl (DULCOLAX) 5 MG EC tablet  calcium carbonate (TUMS) 500 MG chewable tablet  cyclobenzaprine (FLEXERIL) 10 MG tablet  dicyclomine (BENTYL) 20 MG tablet  docusate sodium (COLACE) 50 MG capsule  famotidine (PEPCID) 20 MG tablet  FLUoxetine (PROZAC) 40 MG capsule  hydrocortisone, Perianal, (HYDROCORTISONE) 2.5 % cream  hydrOXYzine (ATARAX) 50 MG tablet  ibuprofen (ADVIL/MOTRIN) 200 MG tablet  loperamide (IMODIUM A-D) 2 MG tablet  LORazepam (ATIVAN) 0.5 MG tablet  mupirocin (BACTROBAN) 2 % external ointment  OLANZapine zydis (ZYPREXA) 5 MG ODT  ondansetron (ZOFRAN) 4 MG tablet  ondansetron (ZOFRAN) 4 MG tablet  pantoprazole (PROTONIX) 40 MG EC tablet  polyethylene glycol (MIRALAX) 17 GM/Dose powder  promethazine (PHENERGAN) 12.5 MG tablet  sennosides (SENOKOT) 8.6 MG tablet  traZODone (DESYREL) 50 MG tablet  Vitamin D, Cholecalciferol, 25 MCG (1000 UT)  TABS      Allergies   Allergen Reactions    Penicillins Rash and Unknown     Family History  Family History   Problem Relation Age of Onset    Diabetes Type 1 Father     Cancer Paternal Grandfather      Social History   Social History     Tobacco Use    Smoking status: Some Days     Packs/day: 0.25     Years: 5.00     Additional pack years: 0.00     Total pack years: 1.25     Types: Cigarettes, Vaping Device    Smokeless tobacco: Never   Substance Use Topics    Alcohol use: No    Drug use: No         A medically appropriate review of systems was performed with pertinent positives and negatives noted in the HPI, and all other systems negative.    Physical Exam   BP: (!) 142/85  Pulse: 103  Temp: 98  F (36.7  C)  Resp: 16  SpO2: 100 %  Physical Exam  Vitals and nursing note reviewed.   Constitutional:       Appearance: She is obese.   HENT:      Head: Normocephalic and atraumatic.      Nose: Nose normal.   Eyes:      Extraocular Movements: Extraocular movements intact.   Cardiovascular:      Rate and Rhythm: Normal rate.      Pulses: Normal pulses.   Pulmonary:      Effort: Pulmonary effort is normal.   Musculoskeletal:         General: No swelling, tenderness, deformity or signs of injury.      Cervical back: Normal range of motion.      Right lower leg: No edema.      Left lower leg: No edema.        Legs:       Comments: Area of pain.  No swelling, redness or warmth.  No tenderness. No posterior calf pain. No leg swelling. Both legs same exam.    Skin:     General: Skin is warm and dry.      Findings: No erythema.   Neurological:      General: No focal deficit present.      Mental Status: She is alert and oriented to person, place, and time.   Psychiatric:         Mood and Affect: Mood normal.         Behavior: Behavior normal.         Thought Content: Thought content normal.         Judgment: Judgment normal.           ED Course, Procedures, & Data      Procedures         Labs Ordered and Resulted from Time of ED  Arrival to Time of ED Departure - No data to display  No orders to display          Critical care was not performed.     Medical Decision Making  The patient's presentation was of low complexity (an acute and uncomplicated illness or injury).    The patient's evaluation involved:  history and exam without other MDM data elements    The patient's management necessitated only low risk treatment.    Assessment & Plan    Sadaf Ross is a 23 year old female with a past medical history of bipolar disorder, borderline personality disorder, and intellectual disability who is well-known to the ED and presenting via EMS for left lower leg pain.  Exam is consistent with muscular pain.  No calf tenderness to suggest dvt.  Patient is well known to the ED and comes almost daily and seems to enjoy coming to the ED.  She is at baseline.  No si/safety concerns. She was discharged back to her group home. She is able to taxi and calls her own rides.  She was offered tylenol or ibuprofen.  She declined and says she will buy herself.     I have reviewed the nursing notes. I have reviewed the findings, diagnosis, plan and need for follow up with the patient.    Discharge Medication List as of 4/1/2024  2:14 PM          Final diagnoses:   Muscular pain - left leg       Ashely Toth MD  [facility] EMERGENCY DEPARTMENT  4/1/2024     Ashely Toth MD  04/01/24 3271

## 2024-04-01 NOTE — TELEPHONE ENCOUNTER
Patient calling very upset that she was discharged from the hospital without asking her how she felt about that and returned to group home in a cab.  Patient stated that she had a plan to kill herself and that she would not live through the night.  Patient was connected with 911, and call ended at that time.  Casi Garcias RN on 3/31/2024 at 9:44 PM    Reason for Disposition   Patient is threatening suicide now    Protocols used: [id number] [id number]

## 2024-04-01 NOTE — ED PROVIDER NOTES
[facility] [place]    3/31/24      ED PROVIDER NOTE   History     Chief Complaint   Patient presents with    Suicidal     SI with plans to bite self     HPI  Sadaf Ross is a 23 year old female with a past medical history of bipolar disorder, borderline personality disorder, and intellectual disability who was biba from  for evaluation of suicidal ideation with a plan to bite herself.  Patient states that she began having suicidal ideation 2 hours ago.  She plans to bite herself.  No known precipitating factors for this episode.  She denies any self harm or suicide attempt prior to calling EMS today.  She has had frequent similar occurrences in the past.  No other symptoms noted.    Past Medical History  Past Medical History:   Diagnosis Date    ADHD (attention deficit hyperactivity disorder)     Bipolar 1 disorder (H)     Borderline personality disorder (H)     Depression     Depressive disorder     Intellectual disability     Obesity     Syncope      Past Surgical History:   Procedure Laterality Date    APPENDECTOMY      APPENDECTOMY       acetaminophen (TYLENOL) 325 MG tablet  albuterol (PROAIR HFA/PROVENTIL HFA/VENTOLIN HFA) 108 (90 Base) MCG/ACT inhaler  ARIPiprazole lauroxil ER (ARISTADA) 882 MG/3.2ML intra-muscular  bisacodyl (DULCOLAX) 5 MG EC tablet  calcium carbonate (TUMS) 500 MG chewable tablet  cyclobenzaprine (FLEXERIL) 10 MG tablet  dicyclomine (BENTYL) 20 MG tablet  docusate sodium (COLACE) 50 MG capsule  famotidine (PEPCID) 20 MG tablet  FLUoxetine (PROZAC) 40 MG capsule  hydrocortisone, Perianal, (HYDROCORTISONE) 2.5 % cream  hydrOXYzine (ATARAX) 50 MG tablet  ibuprofen (ADVIL/MOTRIN) 200 MG tablet  loperamide (IMODIUM A-D) 2 MG tablet  LORazepam (ATIVAN) 0.5 MG tablet  mupirocin (BACTROBAN) 2 % external ointment  OLANZapine zydis (ZYPREXA) 5 MG ODT  ondansetron (ZOFRAN) 4 MG tablet  ondansetron (ZOFRAN) 4 MG tablet  pantoprazole (PROTONIX) 40 MG EC  tablet  polyethylene glycol (MIRALAX) 17 GM/Dose powder  promethazine (PHENERGAN) 12.5 MG tablet  sennosides (SENOKOT) 8.6 MG tablet  traZODone (DESYREL) 50 MG tablet  Vitamin D, Cholecalciferol, 25 MCG (1000 UT) TABS      Allergies   Allergen Reactions    Penicillins Rash and Unknown     Family History  Family History   Problem Relation Age of Onset    Diabetes Type 1 Father     Cancer Paternal Grandfather      Social History   Social History     Tobacco Use    Smoking status: Some Days     Packs/day: 0.25     Years: 5.00     Additional pack years: 0.00     Total pack years: 1.25     Types: Cigarettes, Vaping Device    Smokeless tobacco: Never   Substance Use Topics    Alcohol use: No    Drug use: No         A medically appropriate review of systems was performed with pertinent positives and negatives noted in the HPI, and all other systems negative.    Physical Exam   BP: 124/74  Pulse: 80  Temp: 98.1  F (36.7  C)  Resp: 16  SpO2: 98 %  Physical Exam  Vitals and nursing note reviewed.   Constitutional:       General: She is not in acute distress.     Appearance: She is not diaphoretic.   HENT:      Head: Atraumatic.   Eyes:      Pupils: Pupils are equal, round, and reactive to light.   Cardiovascular:      Rate and Rhythm: Regular rhythm.      Heart sounds: Normal heart sounds.   Pulmonary:      Effort: No respiratory distress.      Breath sounds: Normal breath sounds.   Chest:      Chest wall: No tenderness.   Abdominal:      General: Bowel sounds are normal.      Palpations: Abdomen is soft.      Tenderness: There is no abdominal tenderness.   Musculoskeletal:         General: No tenderness. Normal range of motion.      Cervical back: No tenderness.      Thoracic back: No tenderness.      Lumbar back: No tenderness.   Skin:     Findings: No abrasion or laceration.   Neurological:      Mental Status: She is alert and oriented to person, place, and time.   Psychiatric:         Thought Content: Thought content  includes suicidal ideation.           ED Course, Procedures, & Data      Procedures               No results found for any visits on 03/31/24.  Medications - No data to display  Labs Ordered and Resulted from Time of ED Arrival to Time of ED Departure - No data to display  No orders to display              Assessment & Plan    Is a 23-year-old female who presents with suicidal ideation from group home.  Patient has a history of frequent similar presentations.  Patient states that over the past 2 hours she felt increasingly suicidal with plan to bite herself.  Patient denies any self-harm or attempts to harm herself.  Exam demonstrates no acute abnormalities.  Presentation is consistent with chronic behavior/exacerbation of suicidal ideation.  Per patient's care plan we will discharge back to group home.    I have reviewed the nursing notes. I have reviewed the findings, diagnosis, plan and need for follow up with the patient.    New Prescriptions    No medications on file       Final diagnoses:   None       Ramo Sawant DO  [facility] EMERGENCY DEPARTMENT  3/31/2024     Ramo Sawant DO  03/31/24 [id number]

## 2024-04-01 NOTE — DISCHARGE INSTRUCTIONS
You can take ibuprofen over the counter for pain.  Take only as the bottle indicates and take with food.

## 2024-04-01 NOTE — ED TRIAGE NOTES
Reporting muscle pain on L leg     Triage Assessment (Adult)       Row Name 04/01/24 1348          Triage Assessment    Airway WDL WDL        Respiratory WDL    Respiratory WDL WDL        Cardiac WDL    Cardiac WDL WDL

## 2024-04-01 NOTE — TELEPHONE ENCOUNTER
Pt calling stating she was mad she was discharged from hospital without address her suicidal ideations    Pt states she has a plan and she is currently wanting to kill herself    RN called 911    Gwen Beard RN    Reason for Disposition   Patient is threatening suicide now    Additional Information   Negative: Patient attempted suicide   Negative: Violent behavior, or threatening to physically hurt or kill someone   Negative: Patient is very confused (disoriented, slurred speech) and no other adult (e.g., friend or family member) available   Negative: Difficult to awaken or acting very confused (disoriented, slurred speech) and of new-onset   Negative: Sounds like a life-threatening emergency to the triager   Negative: Depression is main symptom and is not threatening suicide    Protocols used: Suicide [id number]

## 2024-04-02 NOTE — DISCHARGE INSTRUCTIONS
You are seen here in the emergency department for suicidal thoughts, similar to your baseline.  He also mentions some dizziness this morning which has now resolved.  I offered some basic lab work and an EKG and you would like to hold off with these you are now feeling better.  I encourage you to follow-up with your psychiatrist as directed.  If you develop any new or worsening symptoms, return back to the emergency room for further evaluation and management.

## 2024-04-02 NOTE — ED NOTES
RN set up a taxi ride for the patient to the address provided by the patient of [address] [place], [place] [id number]. Phone number left is [phone]. Patient waiting at the  at this time.

## 2024-04-02 NOTE — ED NOTES
Bed: ED19  Expected date: 4/1/24  Expected time: 7:15 PM  Means of arrival: Ambulance  Comments:  [address] 24yo female, dizziness

## 2024-04-02 NOTE — ED TRIAGE NOTES
"Patient BIBA, per report patient called EMS as she is complaining of fatigue. Patient states \"I only slept for about an hour last night and I am feeling tired\". Patient AxOx4 in time of triage. Denies SI/HI, denies any other complaint at this time.      Triage Assessment (Adult)       Row Name 04/01/24 1923          Triage Assessment    Airway WDL WDL        Respiratory WDL    Respiratory WDL WDL        Skin Circulation/Temperature WDL    Skin Circulation/Temperature WDL WDL        Cardiac WDL    Cardiac WDL WDL        Peripheral/Neurovascular WDL    Peripheral Neurovascular WDL WDL        Cognitive/Neuro/Behavioral WDL    Cognitive/Neuro/Behavioral WDL WDL                     "

## 2024-04-02 NOTE — ED PROVIDER NOTES
ED Provider Note  [facility]      History     Chief Complaint   Patient presents with    Fatigue     HPI  Sadaf Ross is a 23 year old female past medical history significant for morbid obesity, mood disorder, intellectual disability, frequent emergency department visits including visit earlier this afternoon who presents the emergency department with concerns for suicidality.  Patient states that they are suicidal noting that this is similar to their chronic suicidality without any new change.  Patient feels that this has improved since being in the emergency department.  They also note that they woke up this morning dizzy, which has resolved.  Patient is able to eat and drink they deny any associated fevers dyspnea chest pain abdominal pain vomiting any other concerns.    Past Medical History  Past Medical History:   Diagnosis Date    ADHD (attention deficit hyperactivity disorder)     Bipolar 1 disorder (H)     Borderline personality disorder (H)     Depression     Depressive disorder     Intellectual disability     Obesity     Syncope      Past Surgical History:   Procedure Laterality Date    APPENDECTOMY      APPENDECTOMY       acetaminophen (TYLENOL) 325 MG tablet  albuterol (PROAIR HFA/PROVENTIL HFA/VENTOLIN HFA) 108 (90 Base) MCG/ACT inhaler  ARIPiprazole lauroxil ER (ARISTADA) 882 MG/3.2ML intra-muscular  bisacodyl (DULCOLAX) 5 MG EC tablet  calcium carbonate (TUMS) 500 MG chewable tablet  cyclobenzaprine (FLEXERIL) 10 MG tablet  dicyclomine (BENTYL) 20 MG tablet  docusate sodium (COLACE) 50 MG capsule  famotidine (PEPCID) 20 MG tablet  FLUoxetine (PROZAC) 40 MG capsule  hydrocortisone, Perianal, (HYDROCORTISONE) 2.5 % cream  hydrOXYzine (ATARAX) 50 MG tablet  ibuprofen (ADVIL/MOTRIN) 200 MG tablet  loperamide (IMODIUM A-D) 2 MG tablet  LORazepam (ATIVAN) 0.5 MG tablet  mupirocin (BACTROBAN) 2 % external ointment  OLANZapine zydis (ZYPREXA) 5 MG ODT  ondansetron (ZOFRAN) 4  MG tablet  ondansetron (ZOFRAN) 4 MG tablet  pantoprazole (PROTONIX) 40 MG EC tablet  polyethylene glycol (MIRALAX) 17 GM/Dose powder  promethazine (PHENERGAN) 12.5 MG tablet  sennosides (SENOKOT) 8.6 MG tablet  traZODone (DESYREL) 50 MG tablet  Vitamin D, Cholecalciferol, 25 MCG (1000 UT) TABS      Allergies   Allergen Reactions    Penicillins Rash and Unknown     Family History  Family History   Problem Relation Age of Onset    Diabetes Type 1 Father     Cancer Paternal Grandfather      Social History   Social History     Tobacco Use    Smoking status: Some Days     Packs/day: 0.25     Years: 5.00     Additional pack years: 0.00     Total pack years: 1.25     Types: Cigarettes, Vaping Device    Smokeless tobacco: Never   Substance Use Topics    Alcohol use: No    Drug use: No         A medically appropriate review of systems was performed with pertinent positives and negatives noted in the HPI, and all other systems negative.    Physical Exam   BP: 130/73  Pulse: 96  Temp: 98  F (36.7  C)  Resp: 18  SpO2: 98 %  Physical Exam      GENERAL APPEARANCE: The patient is well developed, well appearing, and in no acute distress.  HEAD:  Normocephalic and atraumatic.   EENT: Voice normal.  Pupils equally round reactive light.  Chest negative.  NECK: Trachea is midline.No lymphadenopathy or tenderness.  LUNGS: Breath sounds are equal and clear bilaterally. No wheezes, rhonchi, or rales.  HEART: Regular rate and normal rhythm.    ABDOMEN: Soft, flat, and benign. No mass, tenderness, guarding, or rebound.Bowel sounds are present.  EXTREMITIES: No cyanosis, clubbing, or edema.  NEUROLOGIC: No focal sensory or motor deficits are noted.  Cranial nerves II through XII grossly intact.  Rapid altering movements finger-to-nose testing intact her strength is 5 out of 5 bilaterally resisted plantarflexion is 5 out of 5 bilaterally.  PSYCHIATRIC: The patient is awake, alert.  Appropriate mood and affect.  SKIN: Warm, dry, and well  selene hernandez.      ED Course, Procedures, & Data           No results found for any visits on 04/01/24.  Medications - No data to display  Labs Ordered and Resulted from Time of ED Arrival to Time of ED Departure - No data to display  No orders to display          Critical care was not performed.     Medical Decision Making  The patient's presentation was of high complexity (a chronic illness severe exacerbation, progression, or side effect of treatment).    The patient's evaluation involved:  strong consideration of a test (see separate area of note for details) that was ultimately deferred    The patient's management necessitated only low risk treatment.    Assessment & Plan    This is a 23-year-old female well-known to the emergency department present with concerns for suicidality presenting for the second time today here to the ER.  She admits that her suicidality is chronic for her, and is not worse from her baseline.  She feels like she is able to go home safely notes that she has an upcoming appointment with her psychiatrist on April 4.  She mentions some dizziness this morning which is since resolved as well in the absence of infectious symptoms chest pain or dyspnea.  On presentation vital signs reviewed within normal limits she is not tachycardic hypoxic here.  She is overall well-appearing on physical exam has clear breath sounds no focal neurologic deficits.  Discussed with her in regards to her dizziness we could certainly obtain some basic screening labs CBC chemistry and EKG, patient declines these states that she feels well now and does not want to have any testing done.  In regards to the suicidality this is consistent with patient's noted documentation of chronic suicidality.  She follows with a psychiatrist and feels safe going home at this time.  Discussed this with her group home, [facility], who was made aware that the patient will be returning back to the group home.  Patient will be  discharged.  Patient has no other questions or concerns at this time.  Red flag signs were addressed, and they were in agreement with the patient care plan provided.      I have reviewed the nursing notes. I have reviewed the findings, diagnosis, plan and need for follow up with the patient.    Discharge Medication List as of 4/1/2024  8:11 PM          Final diagnoses:   Suicidal ideation - Chronic       LEANNA Carey  [facility] EMERGENCY DEPARTMENT  4/1/2024

## 2024-04-03 ENCOUNTER — HOSPITAL ENCOUNTER (EMERGENCY)
Facility: CLINIC | Age: 24
Discharge: HOME | End: 2024-04-03
Attending: FAMILY MEDICINE | Admitting: FAMILY MEDICINE
Payer: MEDICARE

## 2024-04-03 VITALS
OXYGEN SATURATION: 100 % | SYSTOLIC BLOOD PRESSURE: 132 MMHG | HEART RATE: 90 BPM | DIASTOLIC BLOOD PRESSURE: 91 MMHG | TEMPERATURE: 98.6 F | RESPIRATION RATE: 18 BRPM

## 2024-04-03 DIAGNOSIS — F79 INTELLECTUAL DISABILITY: Chronic | ICD-10-CM

## 2024-04-03 DIAGNOSIS — F60.3 BORDERLINE PERSONALITY DISORDER (H): Chronic | ICD-10-CM

## 2024-04-03 PROCEDURE — 99284 EMERGENCY DEPT VISIT MOD MDM: CPT | Performed by: FAMILY MEDICINE

## 2024-04-03 PROCEDURE — 99283 EMERGENCY DEPT VISIT LOW MDM: CPT | Performed by: FAMILY MEDICINE

## 2024-04-03 PROCEDURE — 250N000013 HC RX MED GY IP 250 OP 250 PS 637: Performed by: FAMILY MEDICINE

## 2024-04-03 RX ORDER — LORAZEPAM 1 MG/1
1 TABLET ORAL ONCE
Status: COMPLETED | OUTPATIENT
Start: 2024-04-03 | End: 2024-04-03

## 2024-04-03 RX ADMIN — LORAZEPAM 1 MG: 1 TABLET ORAL at 21:16

## 2024-04-03 ASSESSMENT — ACTIVITIES OF DAILY LIVING (ADL): ADLS_ACUITY_SCORE: 37

## 2024-04-03 ASSESSMENT — COLUMBIA-SUICIDE SEVERITY RATING SCALE - C-SSRS
2. HAVE YOU ACTUALLY HAD ANY THOUGHTS OF KILLING YOURSELF IN THE PAST MONTH?: NO
6. HAVE YOU EVER DONE ANYTHING, STARTED TO DO ANYTHING, OR PREPARED TO DO ANYTHING TO END YOUR LIFE?: YES
1. IN THE PAST MONTH, HAVE YOU WISHED YOU WERE DEAD OR WISHED YOU COULD GO TO SLEEP AND NOT WAKE UP?: NO

## 2024-04-04 ENCOUNTER — HOSPITAL ENCOUNTER (EMERGENCY)
Facility: CLINIC | Age: 24
Discharge: NURSING, REHAB OR LONG-TERM CARE | End: 2024-04-04
Attending: EMERGENCY MEDICINE | Admitting: EMERGENCY MEDICINE
Payer: MEDICARE

## 2024-04-04 ENCOUNTER — HOSPITAL ENCOUNTER (EMERGENCY)
Facility: CLINIC | Age: 24
Discharge: HOME | End: 2024-04-04
Attending: PSYCHIATRY & NEUROLOGY | Admitting: PSYCHIATRY & NEUROLOGY
Payer: MEDICARE

## 2024-04-04 VITALS
HEIGHT: 63 IN | TEMPERATURE: 98.2 F | OXYGEN SATURATION: 99 % | BODY MASS INDEX: 42.52 KG/M2 | RESPIRATION RATE: 16 BRPM | WEIGHT: 240 LBS | SYSTOLIC BLOOD PRESSURE: 142 MMHG | DIASTOLIC BLOOD PRESSURE: 60 MMHG | HEART RATE: 89 BPM

## 2024-04-04 VITALS
SYSTOLIC BLOOD PRESSURE: 113 MMHG | HEART RATE: 99 BPM | OXYGEN SATURATION: 100 % | TEMPERATURE: 99.1 F | DIASTOLIC BLOOD PRESSURE: 80 MMHG | RESPIRATION RATE: 16 BRPM

## 2024-04-04 DIAGNOSIS — F60.3 BORDERLINE PERSONALITY DISORDER (H): ICD-10-CM

## 2024-04-04 DIAGNOSIS — F79 INTELLECTUAL DISABILITY: ICD-10-CM

## 2024-04-04 DIAGNOSIS — R06.6 HICCUPS: ICD-10-CM

## 2024-04-04 DIAGNOSIS — Z76.5 MALINGERING: ICD-10-CM

## 2024-04-04 PROCEDURE — 99283 EMERGENCY DEPT VISIT LOW MDM: CPT | Performed by: EMERGENCY MEDICINE

## 2024-04-04 PROCEDURE — 99283 EMERGENCY DEPT VISIT LOW MDM: CPT | Performed by: PSYCHIATRY & NEUROLOGY

## 2024-04-04 ASSESSMENT — COLUMBIA-SUICIDE SEVERITY RATING SCALE - C-SSRS
2. HAVE YOU ACTUALLY HAD ANY THOUGHTS OF KILLING YOURSELF IN THE PAST MONTH?: YES
1. IN THE PAST MONTH, HAVE YOU WISHED YOU WERE DEAD OR WISHED YOU COULD GO TO SLEEP AND NOT WAKE UP?: NO
4. HAVE YOU HAD THESE THOUGHTS AND HAD SOME INTENTION OF ACTING ON THEM?: NO
1. IN THE PAST MONTH, HAVE YOU WISHED YOU WERE DEAD OR WISHED YOU COULD GO TO SLEEP AND NOT WAKE UP?: YES
6. HAVE YOU EVER DONE ANYTHING, STARTED TO DO ANYTHING, OR PREPARED TO DO ANYTHING TO END YOUR LIFE?: YES
3. HAVE YOU BEEN THINKING ABOUT HOW YOU MIGHT KILL YOURSELF?: YES
5. HAVE YOU STARTED TO WORK OUT OR WORKED OUT THE DETAILS OF HOW TO KILL YOURSELF? DO YOU INTEND TO CARRY OUT THIS PLAN?: YES
6. HAVE YOU EVER DONE ANYTHING, STARTED TO DO ANYTHING, OR PREPARED TO DO ANYTHING TO END YOUR LIFE?: YES
2. HAVE YOU ACTUALLY HAD ANY THOUGHTS OF KILLING YOURSELF IN THE PAST MONTH?: NO

## 2024-04-04 ASSESSMENT — ACTIVITIES OF DAILY LIVING (ADL): ADLS_ACUITY_SCORE: 37

## 2024-04-04 NOTE — ED TRIAGE NOTES
Pt brought in by ambulance. Pt c/o of hiccups and abd pain that started a few hours ago. Pt states it is sharp. Denies any vomiting.     Triage Assessment (Adult)       Row Name 04/04/24 1436          Triage Assessment    Airway WDL WDL        Respiratory WDL    Respiratory WDL WDL        Skin Circulation/Temperature WDL    Skin Circulation/Temperature WDL WDL        Cardiac WDL    Cardiac WDL WDL        Peripheral/Neurovascular WDL    Peripheral Neurovascular WDL WDL        Cognitive/Neuro/Behavioral WDL    Cognitive/Neuro/Behavioral WDL WDL

## 2024-04-04 NOTE — ED TRIAGE NOTES
"Patient picked by EMS, reported a panic attack. Reports she is stressed about \"life\".     Triage Assessment (Adult)       Row Name 04/03/24 2058          Triage Assessment    Airway WDL WDL        Respiratory WDL    Respiratory WDL WDL        Skin Circulation/Temperature WDL    Skin Circulation/Temperature WDL WDL        Cardiac WDL    Cardiac WDL WDL        Peripheral/Neurovascular WDL    Peripheral Neurovascular WDL WDL        Cognitive/Neuro/Behavioral WDL    Cognitive/Neuro/Behavioral WDL WDL                     "

## 2024-04-04 NOTE — ED PROVIDER NOTES
"ED Provider Note  [facility]      History     Chief Complaint   Patient presents with    Hiccups     HPI  Sadaf Ross is a 23 year old female with intellectual disability, borderline personality disorder, bipolar who presents to the ED due to hiccups earlier today that lasted 20 minutes. They have resolved but she wanted to come to the ER anyway. She says her stomach is \"sick.\"  She lives at a group home and likes staff there. She says her stomach was bothering her, probably from anxiety but didn't take her pepto bismol which usually works and she has with her in her backpack,.        Physical Exam      Physical Exam  Vitals and nursing note reviewed.   Constitutional:       General: She is not in acute distress.     Appearance: She is obese. She is not ill-appearing, toxic-appearing or diaphoretic.   HENT:      Head: Normocephalic and atraumatic.      Right Ear: External ear normal.      Left Ear: External ear normal.      Nose: Nose normal.   Eyes:      General: No scleral icterus.     Extraocular Movements: Extraocular movements intact.   Pulmonary:      Effort: Pulmonary effort is normal.   Abdominal:      General: Abdomen is flat.      Palpations: Abdomen is soft.      Tenderness: There is no abdominal tenderness.   Musculoskeletal:         General: No signs of injury.      Cervical back: Normal range of motion.   Skin:     General: Skin is warm and dry.      Coloration: Skin is not jaundiced or pale.   Neurological:      General: No focal deficit present.      Mental Status: She is alert and oriented to person, place, and time.   Psychiatric:         Attention and Perception: Attention and perception normal.         Mood and Affect: Mood normal.         Speech: Speech normal.         Behavior: Behavior normal. Behavior is cooperative.         Thought Content: Thought content normal.         Judgment: Judgment is impulsive and inappropriate.           ED Course, Procedures, & " "Data      Procedures          No results found for any visits on 04/04/24.  Medications - No data to display  Labs Ordered and Resulted from Time of ED Arrival to Time of ED Departure - No data to display  No orders to display          Critical care was not performed.     Medical Decision Making  The patient's presentation was of low complexity (an acute and uncomplicated illness or injury).    The patient's evaluation involved:  review of external note(s) from 3+ sources (yesterday ed visit, 4/1/24 visit, 3/31/24 ed visit)    The patient's management necessitated only low risk treatment.    Assessment & Plan    Sadaf Ross is a 23 year old female with intellectual disability, borderline personality disorder, bipolar who presents to the ED due to hiccups earlier today that lasted 20 minutes. They have resolved but she wanted to come to the ER anyway. She says her stomach is \"sick.\"  Abdomianl exam normal and no labs/imaging indicated.  She lives at a group home and likes staff there. Patient is well known to the ER and comes almost daily.  She appears at her baseline.  She seems to come out of boredom.  She will be discharged with return to group home and to continue working with her current providers.     I have reviewed the nursing notes. I have reviewed the findings, diagnosis, plan and need for follow up with the patient.    New Prescriptions    No medications on file       Final diagnoses:   Intellectual disability   Borderline personality disorder (H)       Ashely Toth MD  [facility] EMERGENCY DEPARTMENT  4/4/2024     Ashely Toth MD  04/04/24 [id number]       Ashely Ttoh MD  04/04/24 [id number]    "

## 2024-04-04 NOTE — ED PROVIDER NOTES
"    [facility] EMERGENCY DEPARTMENT ([facility])    4/03/24      ED PROVIDER NOTE    History     Chief Complaint   Patient presents with    Panic Attack     Patient picked by EMS, reported a panic attack. Reports she is stressed about \"life\".     HPI  Sadaf Ross is a 23 year old female with PMH notable for bipolar disorder, borderline personality disorder, intellectual disability, frequent ED visits who presents to the Emergency Department due to increased episode of anxiety at her group home.  Patient denies any specific stressor    Past Medical History  Past Medical History:   Diagnosis Date    ADHD (attention deficit hyperactivity disorder)     Bipolar 1 disorder (H)     Borderline personality disorder (H)     Depression     Depressive disorder     Intellectual disability     Obesity     Syncope      Past Surgical History:   Procedure Laterality Date    APPENDECTOMY      APPENDECTOMY       acetaminophen (TYLENOL) 325 MG tablet  albuterol (PROAIR HFA/PROVENTIL HFA/VENTOLIN HFA) 108 (90 Base) MCG/ACT inhaler  ARIPiprazole lauroxil ER (ARISTADA) 882 MG/3.2ML intra-muscular  bisacodyl (DULCOLAX) 5 MG EC tablet  calcium carbonate (TUMS) 500 MG chewable tablet  cyclobenzaprine (FLEXERIL) 10 MG tablet  dicyclomine (BENTYL) 20 MG tablet  docusate sodium (COLACE) 50 MG capsule  famotidine (PEPCID) 20 MG tablet  FLUoxetine (PROZAC) 40 MG capsule  hydrocortisone, Perianal, (HYDROCORTISONE) 2.5 % cream  hydrOXYzine (ATARAX) 50 MG tablet  ibuprofen (ADVIL/MOTRIN) 200 MG tablet  loperamide (IMODIUM A-D) 2 MG tablet  LORazepam (ATIVAN) 0.5 MG tablet  mupirocin (BACTROBAN) 2 % external ointment  OLANZapine zydis (ZYPREXA) 5 MG ODT  ondansetron (ZOFRAN) 4 MG tablet  ondansetron (ZOFRAN) 4 MG tablet  pantoprazole (PROTONIX) 40 MG EC tablet  polyethylene glycol (MIRALAX) 17 GM/Dose powder  promethazine (PHENERGAN) 12.5 MG tablet  sennosides (SENOKOT) 8.6 MG tablet  traZODone (DESYREL) 50 MG tablet  Vitamin D, " Cholecalciferol, 25 MCG (1000 UT) TABS      Allergies   Allergen Reactions    Penicillins Rash and Unknown     Family History  Family History   Problem Relation Age of Onset    Diabetes Type 1 Father     Cancer Paternal Grandfather      Social History   Social History     Tobacco Use    Smoking status: Some Days     Packs/day: 0.25     Years: 5.00     Additional pack years: 0.00     Total pack years: 1.25     Types: Cigarettes, Vaping Device    Smokeless tobacco: Never   Substance Use Topics    Alcohol use: No    Drug use: No         A complete review of systems was performed with pertinent positives and negatives noted in the HPI, and all other systems negative.    Physical Exam   BP: (!) 132/91  Pulse: 90  Temp: 98.6  F (37  C)  Resp: 18  SpO2: 100 %  Physical Exam  Constitutional:       General: She is not in acute distress.     Appearance: Normal appearance. She is not diaphoretic.   HENT:      Head: Atraumatic.      Mouth/Throat:      Mouth: Mucous membranes are moist.   Eyes:      General: No scleral icterus.     Conjunctiva/sclera: Conjunctivae normal.   Cardiovascular:      Rate and Rhythm: Normal rate.      Heart sounds: Normal heart sounds.   Pulmonary:      Effort: No respiratory distress.      Breath sounds: Normal breath sounds.   Abdominal:      General: Abdomen is flat.   Musculoskeletal:      Cervical back: Neck supple.   Skin:     General: Skin is warm.      Findings: No rash.   Neurological:      General: No focal deficit present.      Mental Status: She is alert and oriented to person, place, and time.      Sensory: No sensory deficit.      Motor: No weakness.      Coordination: Coordination normal.   Psychiatric:         Mood and Affect: Mood is anxious.         Thought Content: Thought content does not include homicidal or suicidal ideation.           ED Course, Procedures, & Data      Procedures          No results found for any visits on 04/03/24.  Medications   LORazepam (ATIVAN) tablet 1 mg  (1 mg Oral $Given 4/3/24 7429)     Labs Ordered and Resulted from Time of ED Arrival to Time of ED Departure - No data to display  No orders to display          Critical care was not performed.     Medical Decision Making  The patient's presentation was of moderate complexity (a chronic illness mild to moderate exacerbation, progression, or side effect of treatment).    The patient's evaluation involved:  history and exam without other MDM data elements    The patient's management necessitated moderate risk (prescription drug management including medications given in the ED).    Assessment & Plan        I have reviewed the nursing notes. I have reviewed the findings, diagnosis, plan and need for follow up with the patient.        Final diagnoses:   Intellectual disability   Borderline personality disorder (H)         [facility] EMERGENCY DEPARTMENT  4/3/2024     Robert Olea MD  04/03/24 5036

## 2024-04-05 NOTE — ED TRIAGE NOTES
Triage Assessment (Adult)       Row Name 04/04/24 1940          Triage Assessment    Airway WDL WDL        Respiratory WDL    Respiratory WDL WDL        Skin Circulation/Temperature WDL    Skin Circulation/Temperature WDL WDL                      no

## 2024-04-05 NOTE — ED PROVIDER NOTES
[facility] EMERGENCY DEPARTMENT ([facility])    4/04/24      ED PROVIDER NOTE    History     Chief Complaint   Patient presents with    Suicidal     Suicidal thoughts with plans to bite herself.     HPI  Sadaf Ross is a 23 year old female with PMH notable for bipolar disorder, borderline personality disorder, intellectual disability, frequent ED visits who presents to the Emergency Department with multiple needs previously, including threatening suicide in order to get brought to the ED out of boredom. Patient is well-known to me and I recommended pursuance of a guardian if she persists in excessive use of the ED. She initially reports feeling suicidal, but when I told her I will follow-through with committing her and getting a guardian set up, she quickly demanding to go home. She denied feeling suicidal.    Patient was recently discharged on 1551 this afternoon after presenting to the ED with hiccups that had resolved prior to arrival. Normal abdominal exam. No labs/imaging indicated. She was discharged to her  with PCP follow up.    Past Medical History  Past Medical History:   Diagnosis Date    ADHD (attention deficit hyperactivity disorder)     Bipolar 1 disorder (H)     Borderline personality disorder (H)     Depression     Depressive disorder     Intellectual disability     Obesity     Syncope      Past Surgical History:   Procedure Laterality Date    APPENDECTOMY      APPENDECTOMY       acetaminophen (TYLENOL) 325 MG tablet  albuterol (PROAIR HFA/PROVENTIL HFA/VENTOLIN HFA) 108 (90 Base) MCG/ACT inhaler  ARIPiprazole lauroxil ER (ARISTADA) 882 MG/3.2ML intra-muscular  bisacodyl (DULCOLAX) 5 MG EC tablet  calcium carbonate (TUMS) 500 MG chewable tablet  cyclobenzaprine (FLEXERIL) 10 MG tablet  dicyclomine (BENTYL) 20 MG tablet  docusate sodium (COLACE) 50 MG capsule  famotidine (PEPCID) 20 MG tablet  FLUoxetine (PROZAC) 40 MG capsule  hydrocortisone, Perianal, (HYDROCORTISONE) 2.5 %  cream  hydrOXYzine (ATARAX) 50 MG tablet  ibuprofen (ADVIL/MOTRIN) 200 MG tablet  loperamide (IMODIUM A-D) 2 MG tablet  LORazepam (ATIVAN) 0.5 MG tablet  mupirocin (BACTROBAN) 2 % external ointment  OLANZapine zydis (ZYPREXA) 5 MG ODT  ondansetron (ZOFRAN) 4 MG tablet  ondansetron (ZOFRAN) 4 MG tablet  pantoprazole (PROTONIX) 40 MG EC tablet  polyethylene glycol (MIRALAX) 17 GM/Dose powder  promethazine (PHENERGAN) 12.5 MG tablet  sennosides (SENOKOT) 8.6 MG tablet  traZODone (DESYREL) 50 MG tablet  Vitamin D, Cholecalciferol, 25 MCG (1000 UT) TABS      Allergies   Allergen Reactions    Penicillins Rash and Unknown     Family History  Family History   Problem Relation Age of Onset    Diabetes Type 1 Father     Cancer Paternal Grandfather      Social History   Social History     Tobacco Use    Smoking status: Some Days     Packs/day: 0.25     Years: 5.00     Additional pack years: 0.00     Total pack years: 1.25     Types: Cigarettes, Vaping Device    Smokeless tobacco: Never   Substance Use Topics    Alcohol use: No    Drug use: No         A complete review of systems was performed with pertinent positives and negatives noted in the HPI, and all other systems negative.    Physical Exam   BP: 113/80  Pulse: 99  Temp: 99.1  F (37.3  C)  Resp: 16  SpO2: 100 %  Physical Exam  Vitals and nursing note reviewed.   HENT:      Head: Normocephalic.   Eyes:      Pupils: Pupils are equal, round, and reactive to light.   Pulmonary:      Effort: Pulmonary effort is normal.   Musculoskeletal:         General: Normal range of motion.      Cervical back: Normal range of motion.   Neurological:      General: No focal deficit present.      Mental Status: She is alert.   Psychiatric:         Attention and Perception: Attention and perception normal.         Mood and Affect: Mood and affect normal.         Speech: Speech normal.         Behavior: Behavior normal. Behavior is not agitated, aggressive, hyperactive or combative. Behavior  is cooperative.         Thought Content: Thought content normal. Thought content is not paranoid or delusional. Thought content does not include homicidal or suicidal ideation.         Cognition and Memory: Cognition normal.         Judgment: Judgment normal.           ED Course, Procedures, & Data      Procedures       10 minutes spent reviewing prior records and intervention.     No results found for any visits on 04/04/24.  Medications - No data to display  Labs Ordered and Resulted from Time of ED Arrival to Time of ED Departure - No data to display  No orders to display          Critical care was not performed.     Medical Decision Making  The patient's presentation was of moderate complexity (2 or more stable chronic illnesses).    The patient's evaluation involved:  review of external note(s) from 2 sources (see separate area of note for details)  review of 2 test result(s) ordered prior to this encounter (see separate area of note for details)    The patient's management necessitated moderate risk (limitations due to social determinants of health (inadequate community  support)).    Assessment & Plan    Patient is here for a second visit today. She now claims that she feels suicidal. She is well-known to myself due to her previous ED visits. She comes in out of boredom and enjoys the attention that she gets here. I told patient that I plan on admitting her and will commit her and recommend county guardianship.  She does not want this and quickly changed her mind and wanted to return to her group home.     Patient can be discharged. She appears at baseline.    I have reviewed the nursing notes. I have reviewed the findings, diagnosis, plan and need for follow up with the patient.    New Prescriptions    No medications on file       Final diagnoses:   Intellectual disability   Malingering       [facility] EMERGENCY DEPARTMENT  4/4/2024     Magno Sena MD  04/04/24 2007

## 2024-04-06 ENCOUNTER — HOSPITAL ENCOUNTER (EMERGENCY)
Facility: HOSPITAL | Age: 24
Discharge: HOME | End: 2024-04-06
Attending: EMERGENCY MEDICINE | Admitting: EMERGENCY MEDICINE
Payer: MEDICARE

## 2024-04-06 VITALS
SYSTOLIC BLOOD PRESSURE: 129 MMHG | BODY MASS INDEX: 41.99 KG/M2 | HEART RATE: 93 BPM | OXYGEN SATURATION: 96 % | WEIGHT: 237 LBS | DIASTOLIC BLOOD PRESSURE: 79 MMHG | TEMPERATURE: 98.1 F | RESPIRATION RATE: 18 BRPM | HEIGHT: 63 IN

## 2024-04-06 DIAGNOSIS — R10.9 RIGHT FLANK PAIN: ICD-10-CM

## 2024-04-06 LAB
ALBUMIN UR-MCNC: NEGATIVE MG/DL
APPEARANCE UR: CLEAR
BILIRUB UR QL STRIP: NEGATIVE
COLOR UR AUTO: ABNORMAL
GLUCOSE UR STRIP-MCNC: NEGATIVE MG/DL
HGB UR QL STRIP: ABNORMAL
KETONES UR STRIP-MCNC: NEGATIVE MG/DL
LEUKOCYTE ESTERASE UR QL STRIP: NEGATIVE
MUCOUS THREADS #/AREA URNS LPF: PRESENT /LPF
NITRATE UR QL: NEGATIVE
PH UR STRIP: 7 [PH] (ref 5–7)
RBC URINE: 10 /HPF
SP GR UR STRIP: 1.02 (ref 1–1.03)
SQUAMOUS EPITHELIAL: 4 /HPF
UROBILINOGEN UR STRIP-MCNC: <2 MG/DL
WBC URINE: 1 /HPF

## 2024-04-06 PROCEDURE — 81001 URINALYSIS AUTO W/SCOPE: CPT | Performed by: EMERGENCY MEDICINE

## 2024-04-06 PROCEDURE — 99283 EMERGENCY DEPT VISIT LOW MDM: CPT

## 2024-04-06 ASSESSMENT — COLUMBIA-SUICIDE SEVERITY RATING SCALE - C-SSRS
1. IN THE PAST MONTH, HAVE YOU WISHED YOU WERE DEAD OR WISHED YOU COULD GO TO SLEEP AND NOT WAKE UP?: YES
6. HAVE YOU EVER DONE ANYTHING, STARTED TO DO ANYTHING, OR PREPARED TO DO ANYTHING TO END YOUR LIFE?: YES
2. HAVE YOU ACTUALLY HAD ANY THOUGHTS OF KILLING YOURSELF IN THE PAST MONTH?: YES

## 2024-04-06 ASSESSMENT — ACTIVITIES OF DAILY LIVING (ADL): ADLS_ACUITY_SCORE: 39

## 2024-04-06 NOTE — ED TRIAGE NOTES
Patient arrives via EMS for concerns of R flank pain and dysuria. Patient states these symptoms began 1 hour PTA. Patient denies any blood in urine. Patient is concerned for kidney stone, but denies any hx.     Triage Assessment (Adult)       Row Name 04/06/24 2870          Triage Assessment    Airway WDL WDL        Respiratory WDL    Respiratory WDL WDL        Skin Circulation/Temperature WDL    Skin Circulation/Temperature WDL WDL        Cardiac WDL    Cardiac WDL WDL        Peripheral/Neurovascular WDL    Peripheral Neurovascular WDL WDL        Cognitive/Neuro/Behavioral WDL    Cognitive/Neuro/Behavioral WDL WDL

## 2024-04-06 NOTE — ED NOTES
Bed: JNEDH-E  Expected date: 4/6/24  Expected time: 6:26 PM  Means of arrival: Ambulance  Comments:  [place] 23[place] pain, ?kidney stones

## 2024-04-07 ENCOUNTER — APPOINTMENT (OUTPATIENT)
Dept: RADIOLOGY | Facility: HOSPITAL | Age: 24
End: 2024-04-07
Attending: EMERGENCY MEDICINE
Payer: MEDICARE

## 2024-04-07 ENCOUNTER — HOSPITAL ENCOUNTER (EMERGENCY)
Facility: HOSPITAL | Age: 24
Discharge: NURSING, REHAB OR LONG-TERM CARE | End: 2024-04-07
Attending: EMERGENCY MEDICINE | Admitting: EMERGENCY MEDICINE
Payer: MEDICARE

## 2024-04-07 VITALS
RESPIRATION RATE: 18 BRPM | HEART RATE: 84 BPM | WEIGHT: 237 LBS | SYSTOLIC BLOOD PRESSURE: 130 MMHG | BODY MASS INDEX: 41.99 KG/M2 | TEMPERATURE: 98.2 F | OXYGEN SATURATION: 98 % | HEIGHT: 63 IN | DIASTOLIC BLOOD PRESSURE: 82 MMHG

## 2024-04-07 DIAGNOSIS — K21.00 GASTROESOPHAGEAL REFLUX DISEASE WITH ESOPHAGITIS WITHOUT HEMORRHAGE: ICD-10-CM

## 2024-04-07 DIAGNOSIS — R35.0 URINARY FREQUENCY: ICD-10-CM

## 2024-04-07 PROBLEM — Z91.51 HISTORY OF SUICIDE ATTEMPT: Status: ACTIVE | Noted: 2020-10-05

## 2024-04-07 PROBLEM — K59.09 CHRONIC CONSTIPATION: Status: ACTIVE | Noted: 2023-05-12

## 2024-04-07 PROBLEM — F33.42 MAJOR DEPRESSIVE DISORDER, RECURRENT, IN FULL REMISSION (H): Status: ACTIVE | Noted: 2022-03-02

## 2024-04-07 PROBLEM — E55.9 VITAMIN D DEFICIENCY: Status: ACTIVE | Noted: 2020-10-05

## 2024-04-07 LAB
ALBUMIN UR-MCNC: NEGATIVE MG/DL
APPEARANCE UR: CLEAR
BILIRUB UR QL STRIP: NEGATIVE
COLOR UR AUTO: ABNORMAL
GLUCOSE UR STRIP-MCNC: NEGATIVE MG/DL
HCG UR QL: NEGATIVE
HGB UR QL STRIP: NEGATIVE
KETONES UR STRIP-MCNC: NEGATIVE MG/DL
LEUKOCYTE ESTERASE UR QL STRIP: NEGATIVE
MUCOUS THREADS #/AREA URNS LPF: PRESENT /LPF
NITRATE UR QL: NEGATIVE
PH UR STRIP: 6.5 [PH] (ref 5–7)
RBC URINE: <1 /HPF
SP GR UR STRIP: 1.02 (ref 1–1.03)
SQUAMOUS EPITHELIAL: 1 /HPF
UROBILINOGEN UR STRIP-MCNC: <2 MG/DL
WBC URINE: <1 /HPF

## 2024-04-07 PROCEDURE — 81025 URINE PREGNANCY TEST: CPT | Performed by: EMERGENCY MEDICINE

## 2024-04-07 PROCEDURE — 250N000013 HC RX MED GY IP 250 OP 250 PS 637: Performed by: EMERGENCY MEDICINE

## 2024-04-07 PROCEDURE — 71046 X-RAY EXAM CHEST 2 VIEWS: CPT

## 2024-04-07 PROCEDURE — 81001 URINALYSIS AUTO W/SCOPE: CPT | Performed by: EMERGENCY MEDICINE

## 2024-04-07 PROCEDURE — 99284 EMERGENCY DEPT VISIT MOD MDM: CPT | Mod: 25

## 2024-04-07 RX ORDER — HYDROXYZINE HYDROCHLORIDE 25 MG/1
50 TABLET, FILM COATED ORAL ONCE
Status: COMPLETED | OUTPATIENT
Start: 2024-04-07 | End: 2024-04-07

## 2024-04-07 RX ORDER — MAGNESIUM HYDROXIDE/ALUMINUM HYDROXICE/SIMETHICONE 120; 1200; 1200 MG/30ML; MG/30ML; MG/30ML
15 SUSPENSION ORAL ONCE
Status: COMPLETED | OUTPATIENT
Start: 2024-04-07 | End: 2024-04-07

## 2024-04-07 RX ADMIN — HYDROXYZINE HYDROCHLORIDE 50 MG: 25 TABLET, FILM COATED ORAL at 18:57

## 2024-04-07 RX ADMIN — ALUMINUM HYDROXIDE, MAGNESIUM HYDROXIDE, AND SIMETHICONE 15 ML: 1200; 120; 1200 SUSPENSION ORAL at 18:15

## 2024-04-07 ASSESSMENT — ACTIVITIES OF DAILY LIVING (ADL)
ADLS_ACUITY_SCORE: 39
ADLS_ACUITY_SCORE: 39

## 2024-04-07 ASSESSMENT — COLUMBIA-SUICIDE SEVERITY RATING SCALE - C-SSRS
1. IN THE PAST MONTH, HAVE YOU WISHED YOU WERE DEAD OR WISHED YOU COULD GO TO SLEEP AND NOT WAKE UP?: YES
6. HAVE YOU EVER DONE ANYTHING, STARTED TO DO ANYTHING, OR PREPARED TO DO ANYTHING TO END YOUR LIFE?: YES
2. HAVE YOU ACTUALLY HAD ANY THOUGHTS OF KILLING YOURSELF IN THE PAST MONTH?: NO

## 2024-04-07 NOTE — ED PROVIDER NOTES
Emergency Department Encounter     Evaluation Date & Time:   2024  5:16 PM    CHIEF COMPLAINT:  Shortness of Breath      Triage Note:Patient arrives via EMS for complaints of shortness of breath that woke her up from a nap around 1630. Patient denies any cough. Also having upper abdominal pain.      Triage Assessment (Adult)       Row Name 24 1718          Triage Assessment    Airway WDL WDL        Respiratory WDL    Respiratory WDL X  subjective shortness of breath        Skin Circulation/Temperature WDL    Skin Circulation/Temperature WDL WDL        Cardiac WDL    Cardiac WDL WDL        Peripheral/Neurovascular WDL    Peripheral Neurovascular WDL WDL        Cognitive/Neuro/Behavioral WDL    Cognitive/Neuro/Behavioral WDL WDL                         FINAL IMPRESSION:    ICD-10-CM    1. Gastroesophageal reflux disease with esophagitis without hemorrhage  K21.00       2. Urinary frequency  R35.0           Impression and Plan     ED COURSE & MEDICAL DECISION MAKIN:57 PM I met with the patient for the initial interview and physical examination. Discussed plan for treatment and workup in the ED.    6:43 PM I rechecked and updated the patient.   ED Course as of 24 1906   Sun 2024   1730 She was being seen for mid upper chest discomfort that was described as burning pain and consistent with her acid indigestion symptoms previously.  She has had a recent cough and cold and only occasional issues with her acid reflux that typically improved with Tums.  However, although she was at her group home when her symptoms started she did not ask for a Tums although she states she would have asked for 1 if there was one available.  Instead, she came to the emergency department.  No associated findings of shortness of breath, emesis, back pain, or vital sign abnormality to suggest any other cause.  With her recent cough and cold, however, will do chest x-ray to rule out a pneumonia although this seems  unlikely with normal vital signs and no significant history of worsened cough or fever here.  I did offer to do an influenza and COVID swab which she declined.    She did then tell me that she has been having symptoms of UTI for couple of weeks with frequency and burning.  So, she was curious about getting a urinalysis which we will do.  Urine pregnancy test will be done as well.    Does have a history of mental health disease that she disclosed to me and so I did discuss with her whether or not she has been having any issues with that if she feels that she needs to speak with anyone about it and she stated no that she has been doing quite well with that and does not feel that she needs any additional assistance today.  We discussed the care plan for her today and she is agreeable to that.  Did offer GI cocktail for her symptoms to see if that helps to take them away while she is here as they typically improved with Tums.   1833 Just informed by nursing staff that the patient returned from x-ray and was seen to be biting herself with the bite marks on her hand.  She then told the nurse that she was extremely upset and anxious and was feeling the need to kill herself so nursing staff did notify me.  I reviewed her chart and I do see that her safety plan does indicate that this is normal behavior for her and in fact often to the point where it disrupts the milieu of the emergency departments the recommendation is to speak with her group home about it and arranged to get her discharged back to the group home.  These behaviors often start when she understands that she is going to be sent back to the group home.  As she had not complained of anything with her initial evaluation I do not find that this is a mental health crisis and instead it does fit with her normal behavior for her mental health and with reasons that she is at a group home.  She does have as needed hydroxyzine for these behaviors when anxious and so I  "did order a dose of that here.   1904 Chest x-ray is unremarkable.  PERC negative.  No need to do PE study.  Patient discharged home       At the conclusion of the encounter I discussed the results of all the tests and the disposition. The questions were answered. The patient or family acknowledged understanding and was agreeable with the care plan.          0 minutes of critical care time        MEDICATIONS GIVEN IN THE EMERGENCY DEPARTMENT:  Medications   alum & mag hydroxide-simethicone (MAALOX) suspension 15 mL (15 mLs Oral $Given 4/7/24 1815)   hydrOXYzine HCl (ATARAX) tablet 50 mg (50 mg Oral $Given 4/7/24 1857)       NEW PRESCRIPTIONS STARTED AT TODAY'S ED VISIT:  New Prescriptions    No medications on file       HPI     HPI     Sadaf Ross is a 23 year old female with a pertinent history of s/p appendectomy who presents to this ED via EMS for evaluation of shortness of breath and chest pain.    The patient presents with shortness of breath and pleuritic pain in the center of her chest since 1630 this afternoon. Patient also endorses chills, flank pain, dysuria and the need to urinate every 5-10 minutes which she attributes to a possible bladder infection, and an ongoing cough and cold with associated sinus drainage. Patient has a history of heartburn that she describes as a sharp and stabbing pain \"all over\" that has been worse recently and is relieved with Tums. She notes that her symptoms today feel similar, but she has nothing to take at home. Patient drinks fluids frequently but still feels dry. Patient reports a history of anxiety, bipolar, depression, and suicidal thoughts, but notes that she is doing OK currently. She also reports a penicillin allergy.    Patient denies family history of gallbladder problems and did not receive her flu vaccine last fall. She also denies vomiting, fever, and any other symptoms or complaints at this time.     ScHx: patient is coming from a group home and notes " that the staff know that she is here. She notes that this visit is difficult for her because her mom  at this hospital from heart problems in .    REVIEW OF SYSTEMS:  Review of Systems  remainder of systems are all otherwise negative.        Medical History     Past Medical History:   Diagnosis Date    ADHD (attention deficit hyperactivity disorder)     Bipolar 1 disorder (H)     Borderline personality disorder (H)     Depression     Depressive disorder     Intellectual disability     Obesity     Syncope        Past Surgical History:   Procedure Laterality Date    APPENDECTOMY      APPENDECTOMY         Family History   Problem Relation Age of Onset    Diabetes Type 1 Father     Cancer Paternal Grandfather        Social History     Tobacco Use    Smoking status: Some Days     Packs/day: 0.25     Years: 5.00     Additional pack years: 0.00     Total pack years: 1.25     Types: Cigarettes, Vaping Device    Smokeless tobacco: Never   Substance Use Topics    Alcohol use: No    Drug use: No       acetaminophen (TYLENOL) 325 MG tablet  albuterol (PROAIR HFA/PROVENTIL HFA/VENTOLIN HFA) 108 (90 Base) MCG/ACT inhaler  ARIPiprazole lauroxil ER (ARISTADA) 882 MG/3.2ML intra-muscular  bisacodyl (DULCOLAX) 5 MG EC tablet  calcium carbonate (TUMS) 500 MG chewable tablet  cyclobenzaprine (FLEXERIL) 10 MG tablet  dicyclomine (BENTYL) 20 MG tablet  docusate sodium (COLACE) 50 MG capsule  famotidine (PEPCID) 20 MG tablet  FLUoxetine (PROZAC) 40 MG capsule  hydrocortisone, Perianal, (HYDROCORTISONE) 2.5 % cream  hydrOXYzine (ATARAX) 50 MG tablet  ibuprofen (ADVIL/MOTRIN) 200 MG tablet  loperamide (IMODIUM A-D) 2 MG tablet  LORazepam (ATIVAN) 0.5 MG tablet  mupirocin (BACTROBAN) 2 % external ointment  OLANZapine zydis (ZYPREXA) 5 MG ODT  ondansetron (ZOFRAN) 4 MG tablet  ondansetron (ZOFRAN) 4 MG tablet  pantoprazole (PROTONIX) 40 MG EC tablet  polyethylene glycol (MIRALAX) 17 GM/Dose powder  promethazine (PHENERGAN) 12.5 MG  "tablet  sennosides (SENOKOT) 8.6 MG tablet  traZODone (DESYREL) 50 MG tablet  Vitamin D, Cholecalciferol, 25 MCG (1000 UT) TABS        Physical Exam     First Vitals:  Patient Vitals for the past 24 hrs:   BP Temp Temp src Pulse Resp SpO2 Height Weight   04/07/24 1717 130/75 98.2  F (36.8  C) Oral 95 18 98 % 1.6 m (5' 3\") 107.5 kg (237 lb)       PHYSICAL EXAM:   Constitutional:   lying down in recliner , easily conversive.   HENT:  Normocephalic, posterior pharynx wnl, mucous membranes dry and dark pink   Eyes:  PERRL, EOMI, Conjunctiva normal, No discharge, no scleral icterus.  Respiratory:  Breathing easily, Clear to auscultation.  Cardiovascular:  Regular rate and rhythm, nl s1s2 0 murmurs, rubs, or gallops.  Peripheral pulses dp, pt, and radial are wnl.  No peripheral edema   GI:  Bowel sounds normal, Soft, No tenderness, No flank tenderness, nondistended.  :No CVA tenderness.   Musculoskeletal:  Moves all extremities.  No erythematous or swollen major joints,   Integument:  Dry, normal color.  Neurologic:  Alert & oriented x 3, Normal motor function, Normal sensory function, No focal deficits noted. Normal speech.  Psychiatric:  Affect normal, Judgment normal, Mood normal.     Results     LAB AND RADIOLOGY:  All pertinent labs reviewed and interpreted  Results for orders placed or performed during the hospital encounter of 04/07/24   Chest XR,  PA & LAT     Status: None    Narrative    EXAM: XR CHEST 2 VIEWS  LOCATION: [facility]  DATE: 4/7/2024    INDICATION: History of chest pain. Viral URI symptoms.  COMPARISON: Chest radiograph 04/16/2023.      Impression    IMPRESSION:     Lung volumes are low. No evidence of pneumonia. No pleural effusions or pneumothorax. Normal pulmonary vascularity. Nonenlarged cardiac silhouette.   UA with Microscopic     Status: Abnormal   Result Value Ref Range    Color Urine Light Yellow Colorless, Straw, Light Yellow, Yellow    Appearance Urine Clear " Clear    Glucose Urine Negative Negative mg/dL    Bilirubin Urine Negative Negative    Ketones Urine Negative Negative mg/dL    Specific Gravity Urine 1.018 1.001 - 1.030    Blood Urine Negative Negative    pH Urine 6.5 5.0 - 7.0    Protein Albumin Urine Negative Negative mg/dL    Urobilinogen Urine <2.0 <2.0 mg/dL    Nitrite Urine Negative Negative    Leukocyte Esterase Urine Negative Negative    Mucus Urine Present (A) None Seen /LPF    RBC Urine <1 <=2 /HPF    WBC Urine <1 <=5 /HPF    Squamous Epithelials Urine 1 <=1 /HPF   HCG qualitative urine (UPT)     Status: Normal   Result Value Ref Range    hCG Urine Qualitative Negative Negative           [facility] Documentation     Medical Decision Making  Obtained supplemental history:Supplemental history obtained?: Documented in chart  Reviewed external records: External records reviewed?: Documented in chart  Care impacted by chronic illness:Mental Health  Care significantly affected by social determinants of health:N/A  Did you consider but not order tests?: Work up considered but not performed and documented in chart, if applicable  Did you interpret images independently?: Independent interpretation of ECG and images noted in documentation, when applicable.  Consultation discussion with other provider:Did you involve another provider (consultant, , pharmacy, etc.)?: I discussed the care with another health care provider, see documentation for details.  Discharge. I recommended the patient continue their current prescription strength medication(s):  . See documentation for any additional details.      The creation of this record is based on the scribe s observations of the work being performed by Beverly Joaquin and the provider s statements to them. This document has been checked and approved by MD Marcelina Barker MD  Emergency Medicine  [facility] EMERGENCY DEPARTMENT        Marcelina Day MD  04/07/24  1907

## 2024-04-07 NOTE — DISCHARGE INSTRUCTIONS
Take ibuprofen 400 mg every 8 hours and Tylenol 650 m g every 6 hours for pain management.  Continue Flexeril as needed for muscle spasm.  See your primary care physician in follow-up next week.  Return to the emergency department if you have uncontrolled pain or develop any new symptoms including fever, vomiting or blood in your urine.

## 2024-04-07 NOTE — ED NOTES
Bed: [id number]  Expected date:   Expected time:   Means of arrival:   Comments:  [place] 23 female sob

## 2024-04-07 NOTE — ED TRIAGE NOTES
Patient arrives via EMS for complaints of shortness of breath that woke her up from a nap around 1630. Patient denies any cough. Also having upper abdominal pain.      Triage Assessment (Adult)       Row Name 04/07/24 0475          Triage Assessment    Airway WDL WDL        Respiratory WDL    Respiratory WDL X  subjective shortness of breath        Skin Circulation/Temperature WDL    Skin Circulation/Temperature WDL WDL        Cardiac WDL    Cardiac WDL WDL        Peripheral/Neurovascular WDL    Peripheral Neurovascular WDL WDL        Cognitive/Neuro/Behavioral WDL    Cognitive/Neuro/Behavioral WDL WDL

## 2024-04-07 NOTE — ED NOTES
"Patient returned from xray and is tearful and crying stating \"I'm just too depressed\". Patient with bite marks to bilateral hands. Patient states she is having her chronic suicidal thoughts. Denies any plan. Asked patient what she does at group home when she is feeling this way and patient is requesting her home PRN Hydroxyzine. Updated Dr Day.    "

## 2024-04-07 NOTE — ED PROVIDER NOTES
"EMERGENCY DEPARTMENT NOTE     Name: Sadaf Ross    Age/Sex: 23 year old female   MRN: [id number]   Evaluation Date & Time:  4/6/2024  6:56 PM    PCP:    [facility]   ED Provider: Ambrocio Serna D.O.       CHIEF COMPLAINT    Flank Pain       DIAGNOSIS & DISPOSITION/MEDICAL DECISION MAKING     1. Right flank pain        Sadaf Ross is a 23 year old female with relevant past history of intellectual disability and borderline personality disorder who presents to the emergency department for evaluation of right-sided back or flank pain    Differential  diagnosis considered included but not limited to cholecystitis, obstructing urolithiasis, urinary tract infection, musculoskeletal back pain    Medical Decision Making  Patient is afebrile with stable vital signs  Abdomen: Soft nontender, positive bowel sounds.  No organomegaly or mass  Musculoskeletal: Tenderness of the right lumbar paravertebral muscle vasculature without CVA tenderness  Diagnostic studies:  Urinalysis: No pyuria hematuria or bacteriuria  Patient was triaged and UA have been obtained.  I interviewed the patient on a stretcher in the hallway.  She is currently feeling improved and did not wish any further event interventions here in the emergency department.  Currently low suspicion for acute intra-abdominal process and further evaluation with labs or imaging was not pursued  Patient will be discharged and I recommended that she follow-up with her primary care physician this week.    Interventions:None  Discharge Vital Signs:/79   Pulse 93   Temp 98.1  F (36.7  C) (Oral)   Resp 18   Ht 1.6 m (5' 3\")   Wt 107.5 kg (237 lb)   SpO2 96%   BMI 41.98 kg/m       DISPOSITION: Home    Diagnostic studies:  Imaging:  No orders to display      Lab:  Labs Ordered and Resulted from Time of ED Arrival to Time of ED Departure   ROUTINE UA WITH MICROSCOPIC REFLEX TO CULTURE - Abnormal       Result Value    Color Urine Light Yellow      " Appearance Urine Clear      Glucose Urine Negative      Bilirubin Urine Negative      Ketones Urine Negative      Specific Gravity Urine 1.021      Blood Urine 0.2 mg/dL (*)     pH Urine 7.0      Protein Albumin Urine Negative      Urobilinogen Urine <2.0      Nitrite Urine Negative      Leukocyte Esterase Urine Negative      Mucus Urine Present (*)     RBC Urine 10 (*)     WBC Urine 1      Squamous Epithelials Urine 4 (*)                Triage note reviewed:Patient arrives via EMS for concerns of R flank pain and dysuria. Patient states these symptoms began 1 hour PTA. Patient denies any blood in urine. Patient is concerned for kidney stone, but denies any hx.     Triage Assessment (Adult)       Row Name 04/06/24 1027          Triage Assessment    Airway WDL WDL        Respiratory WDL    Respiratory WDL WDL        Skin Circulation/Temperature WDL    Skin Circulation/Temperature WDL WDL        Cardiac WDL    Cardiac WDL WDL        Peripheral/Neurovascular WDL    Peripheral Neurovascular WDL WDL        Cognitive/Neuro/Behavioral WDL    Cognitive/Neuro/Behavioral WDL WDL                         History:  Supplemental history from:   External Record(s) reviewed: Prior 8 ED visits since April 1, 2024    Work Up:  Chart documentation includes differential considered and any EKGs or imaging independently interpreted by provider, where specified.  In additional to work up documented, I considered the following work up: Labs or imaging including CT of the abdomen or renal ultrasound but deferred after shared decision-making conversation with the patient low suspicion for acute process    External consultation:  Discussion of management with another provider: NA    Complicating factors:  Care impacted by chronic illness: Mental Health  Care affected by social determinants of health: N/A    Disposition considerations: Discharge. No recommendations on prescription strength medication(s). N/A.    At the conclusion of the  encounter I discussed the results of all of the tests and the disposition. The questions were answered. The patient or family acknowledged understanding and was agreeable with the care plan.    TOTAL CRITICAL CARE TIME (EXCLUDING PROCEDURES): Not applicable    PROCEDURES:   None    EMERGENCY DEPARTMENT COURSE   7:19 PM I met with the patient to gather history and to perform my initial exam.  We discussed treatment options and the plan for care while in the Emergency Department.    ED INTERVENTIONS   Medications - No data to display    DISCHARGE MEDICATIONS        Review of your medicines        UNREVIEWED medicines. Ask your doctor about these medicines        Dose / Directions   acetaminophen 325 MG tablet  Commonly known as: TYLENOL      Dose: 650 mg  Take 650 mg by mouth every 6 hours as needed for mild pain  Refills: 0     albuterol 108 (90 Base) MCG/ACT inhaler  Commonly known as: PROAIR HFA/PROVENTIL HFA/VENTOLIN HFA      Dose: 2 puff  Inhale 2 puffs into the lungs every 6 hours as needed for shortness of breath, wheezing or cough  Quantity: 18 g  Refills: 0     ARIPiprazole lauroxil  MG/3.2ML intra-muscular  Commonly known as: ARISTADA      Dose: 882 mg  Inject 882 mg into the muscle every 28 days On the 22nd of each month  Refills: 0     bisacodyl 5 MG EC tablet  Commonly known as: DULCOLAX      Dose: 5 mg  Take 1 tablet (5 mg) by mouth daily as needed for constipation  Quantity: 30 tablet  Refills: 0     calcium carbonate 500 MG chewable tablet  Commonly known as: TUMS      Dose: 1 chew tab  Take 1 chew tab by mouth 2 times daily as needed for heartburn  Refills: 0     cyclobenzaprine 10 MG tablet  Commonly known as: FLEXERIL      Dose: 10 mg  Take 10 mg by mouth 3 times daily as needed for muscle spasms  Refills: 0     dicyclomine 20 MG tablet  Commonly known as: BENTYL      Dose: 20 mg  Take 20 mg by mouth 2 times daily as needed (dyspepsia)  Refills: 0     docusate sodium 50 MG capsule  Commonly  known as: COLACE      Dose: 100 mg  Take 100 mg by mouth 2 times daily  Refills: 0     famotidine 20 MG tablet  Commonly known as: PEPCID      Dose: 20 mg  Take 20 mg by mouth daily  Refills: 0     FLUoxetine 40 MG capsule  Commonly known as: PROzac      Dose: 80 mg  Take 80 mg by mouth daily  Refills: 0     hydrocortisone (Perianal) 2.5 % cream  Commonly known as: hydrocortisone      Place rectally 2 times daily as needed for hemorrhoids  Quantity: 30 g  Refills: 0     hydrOXYzine HCl 50 MG tablet  Commonly known as: ATARAX      Dose: 50 mg  Take 50 mg by mouth 2 times daily as needed for anxiety  Refills: 0     ibuprofen 200 MG tablet  Commonly known as: ADVIL/MOTRIN      Dose: 400 mg  Take 2 tablets (400 mg) by mouth every 6 hours as needed for pain  Quantity: 60 tablet  Refills: 0     loperamide 2 MG tablet  Commonly known as: IMODIUM A-D      Take 2 tablets (4 mg) in the morning.  Take 1 tablet (2 mg) with every loose stool.  Do not exceed 8 tablets in a day.  Quantity: 30 tablet  Refills: 0     LORazepam 0.5 MG tablet  Commonly known as: ATIVAN      Dose: 0.5 mg  Take 0.5 mg by mouth once as needed for anxiety Give as needed any time she has the urge to call 911 or to visit the ER  Refills: 0     mupirocin 2 % external ointment  Commonly known as: BACTROBAN      Apply topically 3 times daily  Quantity: 15 g  Refills: 0     OLANZapine zydis 5 MG ODT  Commonly known as: zyPREXA      Dose: 5 mg  Take 1 tablet (5 mg) by mouth At Bedtime  Quantity: 30 tablet  Refills: 0     * ondansetron 4 MG tablet  Commonly known as: ZOFRAN  Indication: Nausea and Vomiting      Dose: 4 mg  Take 4 mg by mouth every 8 hours as needed for nausea  Refills: 0     * ondansetron 4 MG tablet  Commonly known as: Zofran      Dose: 4 mg  Take 1 tablet (4 mg) by mouth every 8 hours as needed  Quantity: 15 tablet  Refills: 0     pantoprazole 40 MG EC tablet  Commonly known as: PROTONIX      Dose: 40 mg  Take 40 mg by mouth daily  Refills: 0      polyethylene glycol 17 GM/Dose powder  Commonly known as: MIRALAX      Dose: 17 g  Take 17 g by mouth daily  Refills: 0     promethazine 12.5 MG tablet  Commonly known as: PHENERGAN      Dose: 12.5 mg  Take 12.5 mg by mouth every 4 hours  Refills: 0     sennosides 8.6 MG tablet  Commonly known as: SENOKOT      Dose: 1 tablet  Take 1 tablet by mouth 2 times daily as needed for constipation  Refills: 0     traZODone 50 MG tablet  Commonly known as: DESYREL      Dose: 100 mg  Take 100 mg by mouth At Bedtime  Refills: 0     Vitamin D3 25 mcg (1000 units) tablet  Commonly known as: CHOLECALCIFEROL      Dose: 1,000 Units  Take 1,000 Units by mouth daily  Refills: 0           * This list has 2 medication(s) that are the same as other medications prescribed for you. Read the directions carefully, and ask your doctor or other care provider to review them with you.                    INFORMATION SOURCE AND LIMITATIONS    History/Exam limitations: Intellectual disability  Patient information was obtained from: Patient  Use of : N/A    HISTORY OF PRESENT ILLNESS   Sadaf Ross is a 23 year old year old female with a relevant past history of bipolar disorder, borderline personality disorder  Actual disability, who presents to this ED  for evaluation of flank pain.  [place] the mall [place] and began having pain in her right flank that she described as sharp or aching.  She did report possible radiation into the right upper quadrant.  No nausea vomiting, fever dysuria and no pulmonary complaints including cough or shortness of breath all other review of systems are negative.        REVIEW OF SYSTEMS:   All other systems reviewed and are negative except as noted above in HPI.    PATIENT HISTORY     Past Medical History:   Diagnosis Date    ADHD (attention deficit hyperactivity disorder)     Bipolar 1 disorder (H)     Borderline personality disorder (H)     Depression     Depressive disorder     Intellectual  "disability     Obesity     Syncope      Patient Active Problem List   Diagnosis    MENTAL HEALTH    Suicidal ideation    Suicidal ideations    Intellectual disability    Bipolar affective disorder, currently depressed, moderate (H)    Borderline personality disorder (H)    Morbid obesity (H)    Unspecified mood (affective) disorder (H24)    Chronic constipation    History of suicide attempt    Hyperhidrosis    Major depressive disorder, recurrent, in full remission (H24)    Vitamin D deficiency     Past Surgical History:   Procedure Laterality Date    APPENDECTOMY      APPENDECTOMY         Allergies   Allergen Reactions    Penicillins Rash and Unknown       OUTPATIENT MEDICATIONS     Discharge Medication List as of 4/6/2024  7:51 PM         Vitals:    04/06/24 1856   BP: 129/79   Pulse: 93   Resp: 18   Temp: 98.1  F (36.7  C)   TempSrc: Oral   SpO2: 96%   Weight: 107.5 kg (237 lb)   Height: 1.6 m (5' 3\")       Physical Exam   Constitutional: Oriented to person, place, and time. Appears well-developed and well-nourished.  Sitting comfortably on the stretcher  Cardiovascular: Normal rate, regular rhythm and normal heart sounds.    Pulmonary/Chest: Normal effort  and breath sounds normal.   Abdominal: Soft. Bowel sounds are normal.   Musculoskeletal: Tenderness of the right lumbar paravertebral musculature    DIAGNOSTICS    LABORATORY FINDINGS (REVIEWED AND INTERPRETED):  Labs Ordered and Resulted from Time of ED Arrival to Time of ED Departure   ROUTINE UA WITH MICROSCOPIC REFLEX TO CULTURE - Abnormal       Result Value    Color Urine Light Yellow      Appearance Urine Clear      Glucose Urine Negative      Bilirubin Urine Negative      Ketones Urine Negative      Specific Gravity Urine 1.021      Blood Urine 0.2 mg/dL (*)     pH Urine 7.0      Protein Albumin Urine Negative      Urobilinogen Urine <2.0      Nitrite Urine Negative      Leukocyte Esterase Urine Negative      Mucus Urine Present (*)     RBC Urine 10 (*) "     WBC Urine 1      Squamous Epithelials Urine 4 (*)          IMAGING (REVIEWED AND INTERPRETED):  No orders to display           Ambrocio Serna D.O.  EMERGENCY MEDICINE   04/06/24  [facility] EMERGENCY DEPARTMENT  [address] [place] [id number] [phone]  Dept: [phone]     Ambrocio Serna DO  04/08/24 0250

## 2024-04-08 ENCOUNTER — HOSPITAL ENCOUNTER (EMERGENCY)
Facility: CLINIC | Age: 24
Discharge: HOME | End: 2024-04-08
Attending: FAMILY MEDICINE | Admitting: FAMILY MEDICINE
Payer: MEDICARE

## 2024-04-08 ENCOUNTER — NURSE TRIAGE (OUTPATIENT)
Dept: NURSING | Facility: CLINIC | Age: 24
End: 2024-04-08
Payer: MEDICARE

## 2024-04-08 VITALS
HEART RATE: 96 BPM | RESPIRATION RATE: 16 BRPM | DIASTOLIC BLOOD PRESSURE: 91 MMHG | OXYGEN SATURATION: 97 % | SYSTOLIC BLOOD PRESSURE: 148 MMHG | TEMPERATURE: 98.9 F

## 2024-04-08 DIAGNOSIS — F79 INTELLECTUAL DISABILITY: Chronic | ICD-10-CM

## 2024-04-08 DIAGNOSIS — F41.0 ANXIETY ATTACK: ICD-10-CM

## 2024-04-08 DIAGNOSIS — F60.3 BORDERLINE PERSONALITY DISORDER (H): Chronic | ICD-10-CM

## 2024-04-08 PROCEDURE — 99284 EMERGENCY DEPT VISIT MOD MDM: CPT | Performed by: FAMILY MEDICINE

## 2024-04-08 PROCEDURE — 99283 EMERGENCY DEPT VISIT LOW MDM: CPT | Performed by: FAMILY MEDICINE

## 2024-04-08 PROCEDURE — 250N000013 HC RX MED GY IP 250 OP 250 PS 637: Performed by: FAMILY MEDICINE

## 2024-04-08 RX ORDER — LORAZEPAM 1 MG/1
1 TABLET ORAL ONCE
Status: COMPLETED | OUTPATIENT
Start: 2024-04-08 | End: 2024-04-08

## 2024-04-08 RX ADMIN — LORAZEPAM 1 MG: 1 TABLET ORAL at 22:11

## 2024-04-08 ASSESSMENT — ENCOUNTER SYMPTOMS: NERVOUS/ANXIOUS: 1

## 2024-04-09 ENCOUNTER — HOSPITAL ENCOUNTER (EMERGENCY)
Facility: HOSPITAL | Age: 24
Discharge: HOME | End: 2024-04-09
Admitting: PHYSICIAN ASSISTANT
Payer: MEDICARE

## 2024-04-09 ENCOUNTER — HOSPITAL ENCOUNTER (EMERGENCY)
Facility: CLINIC | Age: 24
Discharge: HOME | End: 2024-04-09
Attending: FAMILY MEDICINE | Admitting: FAMILY MEDICINE
Payer: MEDICARE

## 2024-04-09 VITALS
HEART RATE: 71 BPM | TEMPERATURE: 98 F | RESPIRATION RATE: 16 BRPM | DIASTOLIC BLOOD PRESSURE: 77 MMHG | OXYGEN SATURATION: 99 % | SYSTOLIC BLOOD PRESSURE: 114 MMHG

## 2024-04-09 VITALS — HEART RATE: 98 BPM | OXYGEN SATURATION: 96 % | RESPIRATION RATE: 14 BRPM

## 2024-04-09 DIAGNOSIS — F79 INTELLECTUAL DISABILITY: Chronic | ICD-10-CM

## 2024-04-09 DIAGNOSIS — N30.01 ACUTE CYSTITIS WITH HEMATURIA: ICD-10-CM

## 2024-04-09 DIAGNOSIS — F60.3 BORDERLINE PERSONALITY DISORDER (H): Chronic | ICD-10-CM

## 2024-04-09 DIAGNOSIS — F41.9 ANXIETY: ICD-10-CM

## 2024-04-09 LAB
ALBUMIN UR-MCNC: 20 MG/DL
APPEARANCE UR: CLEAR
BILIRUB UR QL STRIP: NEGATIVE
COLOR UR AUTO: YELLOW
GLUCOSE UR STRIP-MCNC: NEGATIVE MG/DL
HGB UR QL STRIP: ABNORMAL
KETONES UR STRIP-MCNC: NEGATIVE MG/DL
LEUKOCYTE ESTERASE UR QL STRIP: NEGATIVE
MUCOUS THREADS #/AREA URNS LPF: PRESENT /LPF
NITRATE UR QL: NEGATIVE
PH UR STRIP: 5.5 [PH] (ref 5–7)
RBC URINE: 55 /HPF
SP GR UR STRIP: 1.03 (ref 1–1.03)
SQUAMOUS EPITHELIAL: 5 /HPF
UROBILINOGEN UR STRIP-MCNC: NORMAL MG/DL
WBC URINE: 2 /HPF

## 2024-04-09 PROCEDURE — 99283 EMERGENCY DEPT VISIT LOW MDM: CPT | Performed by: FAMILY MEDICINE

## 2024-04-09 PROCEDURE — 81001 URINALYSIS AUTO W/SCOPE: CPT | Performed by: FAMILY MEDICINE

## 2024-04-09 PROCEDURE — 999N000104 HC STATISTIC NO CHARGE

## 2024-04-09 PROCEDURE — 87086 URINE CULTURE/COLONY COUNT: CPT | Performed by: FAMILY MEDICINE

## 2024-04-09 PROCEDURE — 99284 EMERGENCY DEPT VISIT MOD MDM: CPT | Performed by: FAMILY MEDICINE

## 2024-04-09 PROCEDURE — 250N000013 HC RX MED GY IP 250 OP 250 PS 637: Performed by: FAMILY MEDICINE

## 2024-04-09 RX ORDER — NITROFURANTOIN 25; 75 MG/1; MG/1
100 CAPSULE ORAL 2 TIMES DAILY
Qty: 14 CAPSULE | Refills: 0 | Status: SHIPPED | OUTPATIENT
Start: 2024-04-09 | End: 2024-05-19

## 2024-04-09 RX ORDER — LORAZEPAM 1 MG/1
1 TABLET ORAL ONCE
Status: COMPLETED | OUTPATIENT
Start: 2024-04-09 | End: 2024-04-09

## 2024-04-09 RX ORDER — NITROFURANTOIN 25; 75 MG/1; MG/1
100 CAPSULE ORAL EVERY 12 HOURS SCHEDULED
Status: DISCONTINUED | OUTPATIENT
Start: 2024-04-09 | End: 2024-04-10 | Stop reason: HOSPADM

## 2024-04-09 RX ADMIN — LORAZEPAM 1 MG: 1 TABLET ORAL at 20:58

## 2024-04-09 RX ADMIN — NITROFURANTOIN MONOHYDRATE/MACROCRYSTALS 100 MG: 75; 25 CAPSULE ORAL at 22:35

## 2024-04-09 ASSESSMENT — ACTIVITIES OF DAILY LIVING (ADL)
ADLS_ACUITY_SCORE: 37
ADLS_ACUITY_SCORE: 37

## 2024-04-09 NOTE — ED PROVIDER NOTES
[facility] EMERGENCY DEPARTMENT ([facility])    4/08/24      ED PROVIDER NOTE    History     Chief Complaint   Patient presents with    Anxiety    Chest Pain       Anxiety      Sadaf Ross is a 23 year old female with an ED care plan in place with PMH notable for anxiety and depression, intellectual disability, borderline personality disorder, bipolar I, GERD, and frequent ED visits who presents to the ED BIBA from  with anxiety and chest pain.  Patient presents with similar complaint to the last time she was emergency room with her anxiety along with some chest discomfort which is likely panic in origin.    Past Medical History  Past Medical History:   Diagnosis Date    ADHD (attention deficit hyperactivity disorder)     Bipolar 1 disorder (H)     Borderline personality disorder (H)     Depression     Depressive disorder     Intellectual disability     Obesity     Syncope      Past Surgical History:   Procedure Laterality Date    APPENDECTOMY      APPENDECTOMY       acetaminophen (TYLENOL) 325 MG tablet  albuterol (PROAIR HFA/PROVENTIL HFA/VENTOLIN HFA) 108 (90 Base) MCG/ACT inhaler  ARIPiprazole lauroxil ER (ARISTADA) 882 MG/3.2ML intra-muscular  bisacodyl (DULCOLAX) 5 MG EC tablet  calcium carbonate (TUMS) 500 MG chewable tablet  cyclobenzaprine (FLEXERIL) 10 MG tablet  dicyclomine (BENTYL) 20 MG tablet  docusate sodium (COLACE) 50 MG capsule  famotidine (PEPCID) 20 MG tablet  FLUoxetine (PROZAC) 40 MG capsule  hydrocortisone, Perianal, (HYDROCORTISONE) 2.5 % cream  hydrOXYzine (ATARAX) 50 MG tablet  ibuprofen (ADVIL/MOTRIN) 200 MG tablet  loperamide (IMODIUM A-D) 2 MG tablet  LORazepam (ATIVAN) 0.5 MG tablet  mupirocin (BACTROBAN) 2 % external ointment  OLANZapine zydis (ZYPREXA) 5 MG ODT  ondansetron (ZOFRAN) 4 MG tablet  ondansetron (ZOFRAN) 4 MG tablet  pantoprazole (PROTONIX) 40 MG EC tablet  polyethylene glycol (MIRALAX) 17 GM/Dose powder  promethazine (PHENERGAN) 12.5 MG tablet  sennosides  (SENOKOT) 8.6 MG tablet  traZODone (DESYREL) 50 MG tablet  Vitamin D, Cholecalciferol, 25 MCG (1000 UT) TABS  nitroFURantoin macrocrystal-monohydrate (MACROBID) 100 MG capsule      Allergies   Allergen Reactions    Penicillins Rash and Unknown     Family History  Family History   Problem Relation Age of Onset    Diabetes Type 1 Father     Cancer Paternal Grandfather      Social History   Social History     Tobacco Use    Smoking status: Some Days     Current packs/day: 0.25     Average packs/day: 0.3 packs/day for 5.0 years (1.3 ttl pk-yrs)     Types: Cigarettes, Vaping Device    Smokeless tobacco: Never   Substance Use Topics    Alcohol use: No    Drug use: No      Past medical history, past surgical history, medications, allergies, family history, and social history were reviewed with the patient. No additional pertinent items.      A complete review of systems was performed with pertinent positives and negatives noted in the HPI, and all other systems negative.    Physical Exam   BP: (!) 148/91  Pulse: 96  Temp: 98.9  F (37.2  C)  Resp: 16  SpO2: 97 %  Physical Exam  Vitals and nursing note reviewed.   Constitutional:       General: She is not in acute distress.     Appearance: Normal appearance. She is not diaphoretic.   HENT:      Head: Atraumatic.      Mouth/Throat:      Mouth: Mucous membranes are moist.   Eyes:      General: No scleral icterus.     Conjunctiva/sclera: Conjunctivae normal.   Cardiovascular:      Rate and Rhythm: Normal rate.      Heart sounds: Normal heart sounds.   Pulmonary:      Effort: No respiratory distress.      Breath sounds: Normal breath sounds.   Abdominal:      General: Abdomen is flat.   Musculoskeletal:      Cervical back: Neck supple.   Skin:     General: Skin is warm.      Findings: No rash.   Neurological:      General: No focal deficit present.      Mental Status: She is alert and oriented to person, place, and time.      Sensory: No sensory deficit.      Motor: No weakness.       Coordination: Coordination normal.   Psychiatric:         Mood and Affect: Mood is anxious.         Thought Content: Thought content does not include homicidal or suicidal ideation.           ED Course, Procedures, & Data      Procedures     Medications   LORazepam (ATIVAN) tablet 1 mg (1 mg Oral $Given 4/8/24 2511)     Labs Ordered and Resulted from Time of ED Arrival to Time of ED Departure - No data to display  No orders to display          Critical care was not performed.     Medical Decision Making  The patient's presentation was of moderate complexity (a chronic illness mild to moderate exacerbation, progression, or side effect of treatment).    The patient's evaluation involved:  history and exam without other MDM data elements    The patient's management necessitated medication management including Ativan given here in the emergency room..    Assessment & Plan        I have reviewed the nursing notes. I have reviewed the findings, diagnosis, plan and need for follow up with the patient.        Final diagnoses:   Anxiety attack   Intellectual disability   Borderline personality disorder (H)         [facility] EMERGENCY DEPARTMENT  4/8/2024     Robert Olea MD  04/10/24 7178

## 2024-04-09 NOTE — DISCHARGE INSTRUCTIONS
Discharge back to your group home will be transported home with plans to follow-up with your outpatient providers.

## 2024-04-09 NOTE — TELEPHONE ENCOUNTER
Nurse Triage SBAR    Situation: Difficulty breathing    Background:   -Patient calling  -It is okay to call back and leave a detailed message at this number:    Assessment: Over heating from warm apartment. Having difficulty breathing for five minutes prior to triage call. Pt stated she needs medical help. Pt sounded short of breath.         Recommendation: Call 911 Now.  Offered to call 911 for pt.  Pt stated she would like this writer to call 911 for her, which was done.  [facility] dispatcher stated she will find out what is going on with pt.      -Plans to follow recommendations           Reason for Disposition   SEVERE difficulty breathing (e.g., struggling for each breath, speaks in single words)    Protocols used: Breathing Difficulty-A-AH

## 2024-04-09 NOTE — ED TRIAGE NOTES
Pt jairo, reports pt having anxiety and chest pain since around 1700. Comes from group home.

## 2024-04-09 NOTE — ED NOTES
"Pt reports coming to ED after feeling not well at home.  Pt reports she has inhalers at home, lives in group home. States she feels better now and does not want to be seen, is going to call for a ride home. \"I just needed to breath some oxygen-from the air, now I feel better.\"  "

## 2024-04-10 ENCOUNTER — HOSPITAL ENCOUNTER (EMERGENCY)
Facility: CLINIC | Age: 24
Discharge: NURSING, REHAB OR LONG-TERM CARE | End: 2024-04-10
Attending: EMERGENCY MEDICINE | Admitting: EMERGENCY MEDICINE
Payer: MEDICARE

## 2024-04-10 ENCOUNTER — HOSPITAL ENCOUNTER (EMERGENCY)
Facility: HOSPITAL | Age: 24
Discharge: HOME | End: 2024-04-10
Attending: STUDENT IN AN ORGANIZED HEALTH CARE EDUCATION/TRAINING PROGRAM | Admitting: STUDENT IN AN ORGANIZED HEALTH CARE EDUCATION/TRAINING PROGRAM
Payer: MEDICARE

## 2024-04-10 VITALS
WEIGHT: 237 LBS | RESPIRATION RATE: 16 BRPM | SYSTOLIC BLOOD PRESSURE: 139 MMHG | OXYGEN SATURATION: 100 % | TEMPERATURE: 98.4 F | DIASTOLIC BLOOD PRESSURE: 90 MMHG | HEIGHT: 63 IN | HEART RATE: 98 BPM | BODY MASS INDEX: 41.99 KG/M2

## 2024-04-10 DIAGNOSIS — F79 INTELLECTUAL DISABILITY: ICD-10-CM

## 2024-04-10 DIAGNOSIS — F48.9 MENTAL HEALTH PROBLEM: Primary | ICD-10-CM

## 2024-04-10 DIAGNOSIS — F60.3 BORDERLINE PERSONALITY DISORDER (H): ICD-10-CM

## 2024-04-10 PROCEDURE — 99282 EMERGENCY DEPT VISIT SF MDM: CPT

## 2024-04-10 PROCEDURE — 99283 EMERGENCY DEPT VISIT LOW MDM: CPT | Performed by: EMERGENCY MEDICINE

## 2024-04-10 PROCEDURE — 99282 EMERGENCY DEPT VISIT SF MDM: CPT | Performed by: EMERGENCY MEDICINE

## 2024-04-10 ASSESSMENT — COLUMBIA-SUICIDE SEVERITY RATING SCALE - C-SSRS
2. HAVE YOU ACTUALLY HAD ANY THOUGHTS OF KILLING YOURSELF IN THE PAST MONTH?: YES
3. HAVE YOU BEEN THINKING ABOUT HOW YOU MIGHT KILL YOURSELF?: YES
4. HAVE YOU HAD THESE THOUGHTS AND HAD SOME INTENTION OF ACTING ON THEM?: NO
5. HAVE YOU STARTED TO WORK OUT OR WORKED OUT THE DETAILS OF HOW TO KILL YOURSELF? DO YOU INTEND TO CARRY OUT THIS PLAN?: YES
1. IN THE PAST MONTH, HAVE YOU WISHED YOU WERE DEAD OR WISHED YOU COULD GO TO SLEEP AND NOT WAKE UP?: YES
6. HAVE YOU EVER DONE ANYTHING, STARTED TO DO ANYTHING, OR PREPARED TO DO ANYTHING TO END YOUR LIFE?: YES

## 2024-04-10 ASSESSMENT — ACTIVITIES OF DAILY LIVING (ADL)
ADLS_ACUITY_SCORE: 39
ADLS_ACUITY_SCORE: 39
ADLS_ACUITY_SCORE: 37

## 2024-04-10 NOTE — ED NOTES
Pt given discharge paperwork and instructions. No questions or concerns at this time. VSS. A&O x4. Ambulatory with steady gait. Per MD, pt okay to go by cab. Cab called for pt. Pt declining to have this RN call [facility] and inform them of pt's return. Belongings returned to pt at time of discharge.

## 2024-04-10 NOTE — ED NOTES
Bed: UREDH-C  Expected date:   Expected time:   Means of arrival:   Comments:  N732  23F  SI  Cooperative, on a hold

## 2024-04-10 NOTE — ED PROVIDER NOTES
"EMERGENCY DEPARTMENT ENCOUNTER       ED Course & Medical Decision Making     Final Impression  23 year old female presents for mental health evaluation.  States that she has been feeling \"down on herself\" lately, though on discussion patient denies any suicidal or homicidal ideations.  Patient states that she was having trouble coping due to her anxiety, looks like she does have PRNs at home including hydroxyzine and Ativan, though states she has not received either of these yet today.  States that she also has not eaten much today, just has not had much appetite, did not eat much for breakfast or lunch.  Recommend that she eat, ask for her PRNs at her care facility.  No red flag symptoms, no SI/HI currently.  Patient has a documented care plan that was reviewed, frequent visits to the ER, offered for social interaction.  Patient evaluated in ED bed 40.  Will be discharged with recommendation to ask for her PRNs at her group home.  Patient asking if she can call for her ride home.    Prior to making a final disposition on this patient the results of patient's tests and other diagnostic studies were discussed with the patient. All questions were answered. Patient expressed understanding of the plan and was amenable to it.    Medical Decision Making    History:  Supplemental history from: NA  External Record(s) reviewed as documented below;  [facility] care plan reviewed in EMR, documents frequent visits to multiple ERs, active outpatient team that she can utilize for support, lives in a group home with 24/7 staffing.    Work Up:  Chart documentation includes differential considered and any EKGs or imaging independently interpreted by provider, where specified.    Complicating factors:  Care impacted by chronic illness: Mental health  Care affected by social determinants of health: N/A    Disposition considerations: Discharge. I recommended the patient continue their current prescription strength medication(s): " "Ativan, hydroxyzine. See documentation for any additional details.      Medications - No data to display    New Prescriptions    No medications on file     Final Impression     1. Mental health problem      Chief Complaint     Chief Complaint   Patient presents with    Mental Health Problem     Here for  rasta. Pt states \"I've been feeling down and hard on myself\".     Not currently having suicidal thoughts. Last time attempted harm was in February.       [facility]     Sadaf Ross is a 23 year old female who presents for evaluation of mental health issue.      Physical Exam     BP (!) 139/90   Pulse 98   Temp 98.4  F (36.9  C)   Resp 16   Ht 1.6 m (5' 3\")   Wt 107.5 kg (237 lb)   SpO2 100%   BMI 41.98 kg/m    Constitutional:  Awake, alert, interactive, in no acute distress.  EYES: Conjunctivae non-injected, sclera anicteric.  HENT: Atraumatic.  Neck: Normal ROM.  Respiratory: Respirations even, unlabored, equal chest rise, in no acute respiratory distress.  Cardiovascular:  Distal perfusion appears intact, no cyanosis  Abdomen: Non-distended.  Musculoskeletal: Normal extremities with grossly normal ROM.   Integument: Warm & dry. No appreciable rash, erythema. No significant bruising, abrasions or lacerations noted.  Neurologic: Alert and oriented. Speech clear and fluent. No focal deficits noted.  Ambulatory.  Psychiatric: Denies SI/HI. Cooperative.  Mildly flattened affect.       Morgan Sevilla MD  04/10/24 1501    "

## 2024-04-10 NOTE — ED TRIAGE NOTES
Pt brought in by EMS from [facility].  Pt states suicidal with plan of punching wall or jumping through wall.  Pt admits to EMS.

## 2024-04-10 NOTE — ED TRIAGE NOTES
"Here for  rasta. Pt states \"I've been feeling down and hard on myself\".     Not currently having suicidal thoughts. Last time attempted harm was in February.   "

## 2024-04-10 NOTE — ED PROVIDER NOTES
[facility] EMERGENCY DEPARTMENT ([facility])    4/09/24      ED PROVIDER NOTE     History     Chief Complaint   Patient presents with    Anxiety     Anxiety and abdominal pain.     HPI  Sadaf Ross is a 23 year old female with an ED care plan in place with PMH notable for anxiety and depression, intellectual disability, borderline personality disorder, bipolar I, GERD, and frequent ED visits who presents to the ED for evaluation of anxiety.  Patient also notes that she been having lower abdominal pain and is felt as though she has been going to the bathroom much more frequently and that is normal for her.  Denies any fevers no flank pain    Past Medical History  Past Medical History:   Diagnosis Date    ADHD (attention deficit hyperactivity disorder)     Bipolar 1 disorder (H)     Borderline personality disorder (H)     Depression     Depressive disorder     Intellectual disability     Obesity     Syncope      Past Surgical History:   Procedure Laterality Date    APPENDECTOMY      APPENDECTOMY       nitroFURantoin macrocrystal-monohydrate (MACROBID) 100 MG capsule  acetaminophen (TYLENOL) 325 MG tablet  albuterol (PROAIR HFA/PROVENTIL HFA/VENTOLIN HFA) 108 (90 Base) MCG/ACT inhaler  ARIPiprazole lauroxil ER (ARISTADA) 882 MG/3.2ML intra-muscular  bisacodyl (DULCOLAX) 5 MG EC tablet  calcium carbonate (TUMS) 500 MG chewable tablet  cyclobenzaprine (FLEXERIL) 10 MG tablet  dicyclomine (BENTYL) 20 MG tablet  docusate sodium (COLACE) 50 MG capsule  famotidine (PEPCID) 20 MG tablet  FLUoxetine (PROZAC) 40 MG capsule  hydrocortisone, Perianal, (HYDROCORTISONE) 2.5 % cream  hydrOXYzine (ATARAX) 50 MG tablet  ibuprofen (ADVIL/MOTRIN) 200 MG tablet  loperamide (IMODIUM A-D) 2 MG tablet  LORazepam (ATIVAN) 0.5 MG tablet  mupirocin (BACTROBAN) 2 % external ointment  OLANZapine zydis (ZYPREXA) 5 MG ODT  ondansetron (ZOFRAN) 4 MG tablet  ondansetron (ZOFRAN) 4 MG tablet  pantoprazole (PROTONIX) 40 MG EC  tablet  polyethylene glycol (MIRALAX) 17 GM/Dose powder  promethazine (PHENERGAN) 12.5 MG tablet  sennosides (SENOKOT) 8.6 MG tablet  traZODone (DESYREL) 50 MG tablet  Vitamin D, Cholecalciferol, 25 MCG (1000 UT) TABS      Allergies   Allergen Reactions    Penicillins Rash and Unknown     Family History  Family History   Problem Relation Age of Onset    Diabetes Type 1 Father     Cancer Paternal Grandfather      Social History   Social History     Tobacco Use    Smoking status: Some Days     Current packs/day: 0.25     Average packs/day: 0.3 packs/day for 5.0 years (1.3 ttl pk-yrs)     Types: Cigarettes, Vaping Device    Smokeless tobacco: Never   Substance Use Topics    Alcohol use: No    Drug use: No         A medically appropriate review of systems was performed with pertinent positives and negatives noted in the HPI, and all other systems negative.    Physical Exam   BP: 119/82  Pulse: 98  Temp: 99.3  F (37.4  C)  Resp: 16  SpO2: 100 %  Physical Exam  Constitutional:       General: She is not in acute distress.     Appearance: Normal appearance. She is not diaphoretic.   HENT:      Head: Atraumatic.      Mouth/Throat:      Mouth: Mucous membranes are moist.   Eyes:      General: No scleral icterus.     Conjunctiva/sclera: Conjunctivae normal.   Cardiovascular:      Rate and Rhythm: Normal rate.      Heart sounds: Normal heart sounds.   Pulmonary:      Effort: No respiratory distress.      Breath sounds: Normal breath sounds.   Abdominal:      General: Abdomen is flat.   Musculoskeletal:      Cervical back: Neck supple.   Skin:     General: Skin is warm.      Findings: No rash.   Neurological:      General: No focal deficit present.      Mental Status: She is alert and oriented to person, place, and time.      Sensory: No sensory deficit.      Motor: No weakness.      Coordination: Coordination normal.   Psychiatric:         Mood and Affect: Mood is anxious.         Thought Content: Thought content does not  include homicidal or suicidal ideation.           ED Course, Procedures, & Data      Procedures       Results for orders placed or performed during the hospital encounter of 04/09/24   UA with Microscopic reflex to Culture     Status: Abnormal    Specimen: Urine, Midstream   Result Value Ref Range    Color Urine Yellow Colorless, Straw, Light Yellow, Yellow    Appearance Urine Clear Clear    Glucose Urine Negative Negative mg/dL    Bilirubin Urine Negative Negative    Ketones Urine Negative Negative mg/dL    Specific Gravity Urine 1.031 1.003 - 1.035    Blood Urine Moderate (A) Negative    pH Urine 5.5 5.0 - 7.0    Protein Albumin Urine 20 (A) Negative mg/dL    Urobilinogen Urine Normal Normal, 2.0 mg/dL    Nitrite Urine Negative Negative    Leukocyte Esterase Urine Negative Negative    Mucus Urine Present (A) None Seen /LPF    RBC Urine 55 (H) <=2 /HPF    WBC Urine 2 <=5 /HPF    Squamous Epithelials Urine 5 (H) <=1 /HPF    Narrative    Urine Culture not indicated     Medications   LORazepam (ATIVAN) tablet 1 mg (1 mg Oral $Given 4/9/24 2058)     Labs Ordered and Resulted from Time of ED Arrival to Time of ED Departure   ROUTINE UA WITH MICROSCOPIC REFLEX TO CULTURE - Abnormal       Result Value    Color Urine Yellow      Appearance Urine Clear      Glucose Urine Negative      Bilirubin Urine Negative      Ketones Urine Negative      Specific Gravity Urine 1.031      Blood Urine Moderate (*)     pH Urine 5.5      Protein Albumin Urine 20 (*)     Urobilinogen Urine Normal      Nitrite Urine Negative      Leukocyte Esterase Urine Negative      Mucus Urine Present (*)     RBC Urine 55 (*)     WBC Urine 2      Squamous Epithelials Urine 5 (*)    URINE CULTURE     No orders to display          Critical care was not performed.     Medical Decision Making  The patient's presentation was of moderate complexity (a chronic illness mild to moderate exacerbation, progression, or side effect of treatment).    The patient's  evaluation involved:  ordering and/or review of 1 test(s) in this encounter (see elsewhere in the note please)    The patient's management necessitated moderate risk (prescription drug management including medications given in the ED).    Assessment & Plan        I have reviewed the nursing notes. I have reviewed the findings, diagnosis, plan and need for follow up with the patient.    Discharge Medication List as of 4/9/2024 10:28 PM        START taking these medications    Details   nitroFURantoin macrocrystal-monohydrate (MACROBID) 100 MG capsule Take 1 capsule (100 mg) by mouth 2 times daily, Disp-14 capsule, R-0, E-Prescribe             Final diagnoses:   Anxiety   Intellectual disability   Borderline personality disorder (H)   Acute cystitis with hematuria       Robert Olea  [facility] EMERGENCY DEPARTMENT  4/9/2024     Robert Olea MD  04/10/24 0192

## 2024-04-10 NOTE — DISCHARGE INSTRUCTIONS
Discharge to group home continue with current outpatient providers start Macrobid for 1 week and follow-up with culture results through your primary clinic

## 2024-04-10 NOTE — ED PROVIDER NOTES
"ED Provider Note  [facility]      History     Chief Complaint   Patient presents with    Suicidal     HPI  Sadaf Ross is a 23 year old female with hx of intellectual disability, borderline personality disorder who presents to the ED today saying that she was having a \"really bad mental health problem\" so came here for evaluation. She says it is worse thatn normal.  She said she was thinking about her dad who  3 years ago and so came here.       Physical Exam   BP:  (Pt refuses.)  Physical Exam  Vitals and nursing note reviewed.   Constitutional:       General: She is not in acute distress.     Appearance: Normal appearance. She is obese. She is not ill-appearing, toxic-appearing or diaphoretic.   HENT:      Head: Normocephalic and atraumatic.      Nose: Nose normal.   Eyes:      Extraocular Movements: Extraocular movements intact.   Cardiovascular:      Rate and Rhythm: Normal rate.   Pulmonary:      Effort: Pulmonary effort is normal.   Musculoskeletal:         General: No deformity or signs of injury.      Cervical back: Normal range of motion.   Skin:     General: Skin is warm and dry.      Coloration: Skin is not jaundiced or pale.      Findings: No bruising.   Neurological:      General: No focal deficit present.      Mental Status: She is alert and oriented to person, place, and time.   Psychiatric:         Attention and Perception: Attention and perception normal.         Mood and Affect: Mood and affect normal.         Speech: Speech normal.         Behavior: Behavior normal. Behavior is cooperative.         Thought Content: Thought content normal.         Cognition and Memory: Cognition and memory normal.         Judgment: Judgment is impulsive and inappropriate.           ED Course, Procedures, & Data      Procedures          No results found for any visits on 04/10/24.  Medications - No data to display  Labs Ordered and Resulted from Time of ED Arrival to Time of ED " "Departure - No data to display  No orders to display          Critical care was not performed.     Medical Decision Making  The patient's presentation was of low complexity (a stable chronic illness).    The patient's evaluation involved:  review of external note(s) from 3+ sources (epic including 3 most recent ed notes)    The patient's management necessitated high risk (a decision regarding hospitalization).    Assessment   Sadaf Ross is a 23 year old female with hx of intellectual disability, borderline personality disorder who presents to the ED today saying that she was having a \"really bad mental health problem\" so came here for evaluation. She says it is worse thatn normal.  She said she was thinking about her dad who  3 years ago and so came here.  Patient is well known to the ED and comes several days a week for what appears to be boredom.  She comes from her group home and seems to like it there.  She presents again today with similar complaints.  She waited around for 1 hour and then asked to leave which is normal.  She declines zyprexa.  She had her puzzle book with her and ear buds.  She was discharged back to her group home. She is her own legal guardian.  I asked for vitals to be recorded prior to discharge but were not done.  No concerns on evaluation.     I have reviewed the nursing notes. I have reviewed the findings, diagnosis, plan and need for follow up with the patient.    Discharge Medication List as of 4/10/2024  7:01 PM          Final diagnoses:   Intellectual disability   Borderline personality disorder (H)       Ashely Toth MD  [facility] EMERGENCY DEPARTMENT  4/10/2024     Ashely Toth MD  04/10/24 1959    "

## 2024-04-11 ENCOUNTER — HOSPITAL ENCOUNTER (EMERGENCY)
Facility: CLINIC | Age: 24
Discharge: HOME | End: 2024-04-11
Attending: FAMILY MEDICINE | Admitting: FAMILY MEDICINE
Payer: MEDICARE

## 2024-04-11 DIAGNOSIS — F79 INTELLECTUAL DISABILITY: Chronic | ICD-10-CM

## 2024-04-11 DIAGNOSIS — F60.3 BORDERLINE PERSONALITY DISORDER (H): Chronic | ICD-10-CM

## 2024-04-11 DIAGNOSIS — F41.9 ANXIETY: ICD-10-CM

## 2024-04-11 LAB — BACTERIA UR CULT: NORMAL

## 2024-04-11 PROCEDURE — 99283 EMERGENCY DEPT VISIT LOW MDM: CPT | Performed by: FAMILY MEDICINE

## 2024-04-11 PROCEDURE — 99282 EMERGENCY DEPT VISIT SF MDM: CPT | Performed by: FAMILY MEDICINE

## 2024-04-11 NOTE — ED PROVIDER NOTES
ED Provider Note  [facility]      History     Chief Complaint   Patient presents with    Suicidal     HPI  Sadaf Ross is a 23 year old female with a past medical history significant for intellectual disability, borderline personality disorder and bipolar affective disorder who presents to the Emergency Department for evaluation of chronic anxiety with her underlying borderline personality disorder and intellectual disability.      Past Medical History  Past Medical History:   Diagnosis Date    ADHD (attention deficit hyperactivity disorder)     Bipolar 1 disorder (H)     Borderline personality disorder (H)     Depression     Depressive disorder     Intellectual disability     Obesity     Syncope      Past Surgical History:   Procedure Laterality Date    APPENDECTOMY      APPENDECTOMY       acetaminophen (TYLENOL) 325 MG tablet  albuterol (PROAIR HFA/PROVENTIL HFA/VENTOLIN HFA) 108 (90 Base) MCG/ACT inhaler  ARIPiprazole lauroxil ER (ARISTADA) 882 MG/3.2ML intra-muscular  bisacodyl (DULCOLAX) 5 MG EC tablet  calcium carbonate (TUMS) 500 MG chewable tablet  cyclobenzaprine (FLEXERIL) 10 MG tablet  dicyclomine (BENTYL) 20 MG tablet  docusate sodium (COLACE) 50 MG capsule  famotidine (PEPCID) 20 MG tablet  FLUoxetine (PROZAC) 40 MG capsule  hydrocortisone, Perianal, (HYDROCORTISONE) 2.5 % cream  hydrOXYzine (ATARAX) 50 MG tablet  ibuprofen (ADVIL/MOTRIN) 200 MG tablet  loperamide (IMODIUM A-D) 2 MG tablet  LORazepam (ATIVAN) 0.5 MG tablet  mupirocin (BACTROBAN) 2 % external ointment  nitroFURantoin macrocrystal-monohydrate (MACROBID) 100 MG capsule  OLANZapine zydis (ZYPREXA) 5 MG ODT  ondansetron (ZOFRAN) 4 MG tablet  ondansetron (ZOFRAN) 4 MG tablet  pantoprazole (PROTONIX) 40 MG EC tablet  polyethylene glycol (MIRALAX) 17 GM/Dose powder  promethazine (PHENERGAN) 12.5 MG tablet  sennosides (SENOKOT) 8.6 MG tablet  traZODone (DESYREL) 50 MG tablet  Vitamin D, Cholecalciferol, 25 MCG  (1000 UT) TABS      Allergies   Allergen Reactions    Penicillins Rash and Unknown     Family History  Family History   Problem Relation Age of Onset    Diabetes Type 1 Father     Cancer Paternal Grandfather      Social History   Social History     Tobacco Use    Smoking status: Some Days     Current packs/day: 0.25     Average packs/day: 0.3 packs/day for 5.0 years (1.3 ttl pk-yrs)     Types: Cigarettes, Vaping Device    Smokeless tobacco: Never   Substance Use Topics    Alcohol use: No    Drug use: No      Past medical history, past surgical history, medications, allergies, family history, and social history were reviewed with the patient. No additional pertinent items.      A complete review of systems was performed with pertinent positives and negatives noted in the HPI, and all other systems negative.    Physical Exam      Physical Exam  Constitutional:       General: She is not in acute distress.     Appearance: Normal appearance. She is not diaphoretic.   HENT:      Head: Atraumatic.      Mouth/Throat:      Mouth: Mucous membranes are moist.   Eyes:      General: No scleral icterus.     Conjunctiva/sclera: Conjunctivae normal.   Cardiovascular:      Rate and Rhythm: Normal rate.      Heart sounds: Normal heart sounds.   Pulmonary:      Effort: No respiratory distress.      Breath sounds: Normal breath sounds.   Abdominal:      General: Abdomen is flat.   Musculoskeletal:      Cervical back: Neck supple.   Skin:     General: Skin is warm.      Findings: No rash.   Neurological:      General: No focal deficit present.      Mental Status: She is alert and oriented to person, place, and time.      Sensory: No sensory deficit.      Motor: No weakness.      Coordination: Coordination normal.   Psychiatric:         Mood and Affect: Mood is anxious.         Behavior: Behavior is cooperative.         Thought Content: Thought content does not include homicidal or suicidal ideation.           ED Course, Procedures, &  Data      Procedures          No results found for any visits on 04/11/24.  Medications - No data to display  Labs Ordered and Resulted from Time of ED Arrival to Time of ED Departure - No data to display  No orders to display          Critical care was not performed.     Medical Decision Making  The patient's presentation was of moderate complexity (a chronic illness mild to moderate exacerbation, progression, or side effect of treatment).    The patient's evaluation involved:  history and exam without other MDM data elements    The patient's management necessitated only low risk treatment.    Assessment & Plan        I have reviewed the nursing notes. I have reviewed the findings, diagnosis, plan and need for follow up with the patient.    Final diagnoses:   Intellectual disability   Borderline personality disorder (H)   Anxiety       Robert Olea MD  [facility] EMERGENCY DEPARTMENT  4/11/2024        Robert Olea MD  04/12/24 0017

## 2024-04-11 NOTE — ED NOTES
Bed: UREDH-E  Expected date:   Expected time:   Means of arrival:   Comments:  N713 ETA 5 min  23 F SI coop

## 2024-04-15 ENCOUNTER — HOSPITAL ENCOUNTER (EMERGENCY)
Facility: HOSPITAL | Age: 24
Discharge: HOME | End: 2024-04-15
Attending: STUDENT IN AN ORGANIZED HEALTH CARE EDUCATION/TRAINING PROGRAM | Admitting: STUDENT IN AN ORGANIZED HEALTH CARE EDUCATION/TRAINING PROGRAM
Payer: MEDICARE

## 2024-04-15 VITALS
BODY MASS INDEX: 43.75 KG/M2 | TEMPERATURE: 98.9 F | DIASTOLIC BLOOD PRESSURE: 66 MMHG | OXYGEN SATURATION: 97 % | RESPIRATION RATE: 20 BRPM | HEIGHT: 63 IN | HEART RATE: 93 BPM | SYSTOLIC BLOOD PRESSURE: 146 MMHG | WEIGHT: 246.91 LBS

## 2024-04-15 DIAGNOSIS — G57.12 MERALGIA PARAESTHETICA, LEFT: ICD-10-CM

## 2024-04-15 DIAGNOSIS — M79.662 PAIN OF LEFT LOWER LEG: ICD-10-CM

## 2024-04-15 PROCEDURE — 99282 EMERGENCY DEPT VISIT SF MDM: CPT

## 2024-04-15 ASSESSMENT — COLUMBIA-SUICIDE SEVERITY RATING SCALE - C-SSRS
3. HAVE YOU BEEN THINKING ABOUT HOW YOU MIGHT KILL YOURSELF?: NO
5. HAVE YOU STARTED TO WORK OUT OR WORKED OUT THE DETAILS OF HOW TO KILL YOURSELF? DO YOU INTEND TO CARRY OUT THIS PLAN?: NO
1. IN THE PAST MONTH, HAVE YOU WISHED YOU WERE DEAD OR WISHED YOU COULD GO TO SLEEP AND NOT WAKE UP?: YES
4. HAVE YOU HAD THESE THOUGHTS AND HAD SOME INTENTION OF ACTING ON THEM?: NO
6. HAVE YOU EVER DONE ANYTHING, STARTED TO DO ANYTHING, OR PREPARED TO DO ANYTHING TO END YOUR LIFE?: YES
2. HAVE YOU ACTUALLY HAD ANY THOUGHTS OF KILLING YOURSELF IN THE PAST MONTH?: YES

## 2024-04-15 ASSESSMENT — ACTIVITIES OF DAILY LIVING (ADL): ADLS_ACUITY_SCORE: 37

## 2024-04-15 NOTE — ED TRIAGE NOTES
Ongoing left leg pain, from hip down. States left knee cap is higher than right. Leg pain making difficult to walk up and down stairs.

## 2024-04-15 NOTE — DISCHARGE INSTRUCTIONS
I would recommend taking Tylenol and ibuprofen as needed for pain relief.  Otherwise follow-up with her primary care doctor and physical therapy appointments.

## 2024-04-15 NOTE — ED NOTES
Patient denies any SI at this time. Has had thoughts in the month but feels that depression is under control right now. Charge nurse notified. Patient to update staff if feeling change so that safety can be ensured.

## 2024-04-15 NOTE — ED PROVIDER NOTES
EMERGENCY DEPARTMENT ENCOUNTER      NAME: Sadaf Ross  AGE: 23 year old female  YOB: 2000  MRN: [id number]  EVALUATION DATE & TIME: 4/15/2024 11:00 AM    PCP: [place]    ED PROVIDER: Sandeep Sheth MD      Chief Complaint   Patient presents with    Leg Pain         FINAL IMPRESSION:  1. Pain of left lower leg    2. Meralgia paraesthetica, left          ED COURSE & MEDICAL DECISION MAKING:    Pertinent Labs & Imaging studies reviewed. (See chart for details)  23 year old female presents to the Emergency Department for evaluation of leg pain.    ED Course as of 04/15/24 1543   Mon Apr 15, 2024   1158   Patient is a 23-year-old female presenting to the emergency department left-sided leg pain.  States the pain is in her left hip and lateral thigh and radiates down towards her knee on the anterior aspect.  No associated back pain.  No fevers.  No bowel or bladder incontinence.  No recent injuries or trauma.  She states she has been having this pain on and off for the past several weeks to several months.  Has been taking Tylenol for the pain.    Exam patient is well-appearing in no acute distress.  Hemodynamically stable and afebrile.  Heart and lungs are clear with no wheezes or rails.  No significant lower extremity edema and warm extremities.  No bony tenderness on exam.  No appreciable erythema or leg swelling.  Able to range of motion the left knee and left hip fully without any discomfort.    At this point time suspect probable musculoskeletal etiology for pain or possible neuralgia paresthetica.  Says her pain is essentially resolved since arriving the emergency department.  Found any recent injury or trauma to believe she would benefit from x-ray and does not need cross-sectional imaging at this point time as no red flag signs of back pain.  Recommend ongoing Tylenol and ibuprofen use as needed and she should follow-up with her primary care doctor as well as her upcoming physical therapy  appointments.  This point time patient safe for discharge home.     Medical Decision Making  Obtained supplemental history:Supplemental history obtained?: No  Reviewed external records: External records reviewed?: No  Care impacted by chronic illness:N/A  Care significantly affected by social determinants of health:N/A  Did you consider but not order tests?: Work up considered but not performed and documented in chart, if applicable  Did you interpret images independently?: Independent interpretation of ECG and images noted in documentation, when applicable.  Consultation discussion with other provider:Did you involve another provider (consultant, , pharmacy, etc.)?: No  Discharge. No recommendations on prescription strength medication(s). See documentation for any additional details.          At the conclusion of the encounter I discussed the results of all of the tests and the disposition. The questions were answered. The patient or family acknowledged understanding and was agreeable with the care plan.     0 minutes of critical care time     MEDICATIONS GIVEN IN THE EMERGENCY:  Medications - No data to display    NEW PRESCRIPTIONS STARTED AT TODAY'S ER VISIT  Discharge Medication List as of 4/15/2024 11:53 AM             =================================================================    [facility]    Patient information was obtained from: patient    Patient is a 23-year-old female presenting to the emergency department left-sided leg pain.  States the pain is in her left hip and lateral thigh and radiates down towards her knee on the anterior aspect.  No associated back pain.  No fevers.  No bowel or bladder incontinence.  No recent injuries or trauma.  She states she has been having this pain on and off for the past several weeks to several months.  Has been taking Tylenol for the pain    REVIEW OF SYSTEMS   Refer to the [facility]    PAST MEDICAL HISTORY:  Past Medical History:   Diagnosis Date    ADHD (attention deficit  hyperactivity disorder)     Bipolar 1 disorder (H)     Borderline personality disorder (H)     Depression     Depressive disorder     Intellectual disability     Obesity     Syncope        PAST SURGICAL HISTORY:  Past Surgical History:   Procedure Laterality Date    APPENDECTOMY      APPENDECTOMY             CURRENT MEDICATIONS:    acetaminophen (TYLENOL) 325 MG tablet  albuterol (PROAIR HFA/PROVENTIL HFA/VENTOLIN HFA) 108 (90 Base) MCG/ACT inhaler  ARIPiprazole lauroxil ER (ARISTADA) 882 MG/3.2ML intra-muscular  bisacodyl (DULCOLAX) 5 MG EC tablet  calcium carbonate (TUMS) 500 MG chewable tablet  cyclobenzaprine (FLEXERIL) 10 MG tablet  dicyclomine (BENTYL) 20 MG tablet  docusate sodium (COLACE) 50 MG capsule  famotidine (PEPCID) 20 MG tablet  FLUoxetine (PROZAC) 40 MG capsule  hydrocortisone, Perianal, (HYDROCORTISONE) 2.5 % cream  hydrOXYzine (ATARAX) 50 MG tablet  ibuprofen (ADVIL/MOTRIN) 200 MG tablet  loperamide (IMODIUM A-D) 2 MG tablet  LORazepam (ATIVAN) 0.5 MG tablet  mupirocin (BACTROBAN) 2 % external ointment  nitroFURantoin macrocrystal-monohydrate (MACROBID) 100 MG capsule  OLANZapine zydis (ZYPREXA) 5 MG ODT  ondansetron (ZOFRAN) 4 MG tablet  ondansetron (ZOFRAN) 4 MG tablet  pantoprazole (PROTONIX) 40 MG EC tablet  polyethylene glycol (MIRALAX) 17 GM/Dose powder  promethazine (PHENERGAN) 12.5 MG tablet  sennosides (SENOKOT) 8.6 MG tablet  traZODone (DESYREL) 50 MG tablet  Vitamin D, Cholecalciferol, 25 MCG (1000 UT) TABS        ALLERGIES:  Allergies   Allergen Reactions    Penicillins Rash and Unknown       FAMILY HISTORY:  Family History   Problem Relation Age of Onset    Diabetes Type 1 Father     Cancer Paternal Grandfather        SOCIAL HISTORY:   Social History     Socioeconomic History    Marital status: Single   Tobacco Use    Smoking status: Some Days     Current packs/day: 0.25     Average packs/day: 0.3 packs/day for 5.0 years (1.3 ttl pk-yrs)     Types: Cigarettes, Vaping Device     "Smokeless tobacco: Never   Substance and Sexual Activity    Alcohol use: No    Drug use: No    Sexual activity: Not Currently     Partners: Female, Male     Birth control/protection: Pill, Injection     Social Determinants of Health     Financial Resource Strain: At Risk (2021)    Received from [facility]     Financial Resource Strain     Financial Resource Strain: 2   Food Insecurity: At Risk (2021)    Received from [facility]     Food Insecurity     Food: 2   Transportation Needs: At Risk (2021)    Received from [facility]     Transportation Needs     Transportation: 2   Physical Activity: Not on File (2020)    Received from [facility]     Physical Activity     Physical Activity: 0   Stress: Not at Risk (2021)    Received from [facility]     Stress     Stress: 1   Social Connections: Not at Risk (2021)    Received from [facility]     Social Connections     Social Connections and Isolation: 1   Interpersonal Safety: Not At Risk (2024)    [facility]     Humiliation, Afraid, Rape, and Kick questionnaire     Fear of Current or Ex-Partner: No     Emotionally Abused: No     Physically Abused: No     Sexually Abused: No   Housing Stability: Not at Risk (2021)    Received from [facility]     Housing Stability     Housin       VITALS:  BP (!) 146/66   Pulse 93   Temp 98.9  F (37.2  C) (Temporal)   Resp 20   Ht 1.6 m (5' 3\")   Wt 112 kg (246 lb 14.6 oz)   SpO2 97%   BMI 43.74 kg/m      PHYSICAL EXAM    Constitutional: Well developed, Well nourished, NAD,  HENT: Normocephalic, Atraumatic,  mucous membranes moist,   Neck- trachea midline, No stridor.    Eyes:EOMI, Conjunctiva normal, No discharge.   Respiratory: Normal breath sounds, No respiratory distress, No wheezing.   Cardiovascular: Normal heart rate, Regular rhythm,  No murmurs,   Abdominal: Soft, No tenderness, No rebound or guarding.   "   Musculoskeletal: No bony tenderness, able to flex and internally externally rotate the hip without any pain, able to fully flex and extend the left knee, no appreciable unilateral edema or erythema  Integument: Warm, Dry, No erythema,    Neurologic: Alert & oriented x 3   Psychiatric: Affect normal, Cooperative.      LAB:  All pertinent labs reviewed and interpreted.       RADIOLOGY:  Reviewed all pertinent imaging. Please see official radiology report.  No orders to display       EKG:        PROCEDURES:         [employer] [place] System Documentation:   CMS Diagnoses:            Sandeep Sheth MD  [facility] EMERGENCY DEPARTMENT  [address] [place] [id number]  [phone]      Sandeep Sheth MD  04/15/24 3373

## 2024-04-16 ENCOUNTER — HOSPITAL ENCOUNTER (EMERGENCY)
Facility: HOSPITAL | Age: 24
Discharge: HOME | End: 2024-04-16
Attending: EMERGENCY MEDICINE | Admitting: EMERGENCY MEDICINE
Payer: MEDICARE

## 2024-04-16 VITALS
SYSTOLIC BLOOD PRESSURE: 128 MMHG | HEIGHT: 63 IN | HEART RATE: 71 BPM | BODY MASS INDEX: 41.99 KG/M2 | WEIGHT: 237 LBS | OXYGEN SATURATION: 99 % | RESPIRATION RATE: 16 BRPM | DIASTOLIC BLOOD PRESSURE: 80 MMHG | TEMPERATURE: 98 F

## 2024-04-16 DIAGNOSIS — R46.89 BEHAVIOR CONCERN: ICD-10-CM

## 2024-04-16 PROCEDURE — 99283 EMERGENCY DEPT VISIT LOW MDM: CPT

## 2024-04-16 ASSESSMENT — COLUMBIA-SUICIDE SEVERITY RATING SCALE - C-SSRS
5. HAVE YOU STARTED TO WORK OUT OR WORKED OUT THE DETAILS OF HOW TO KILL YOURSELF? DO YOU INTEND TO CARRY OUT THIS PLAN?: YES
1. IN THE PAST MONTH, HAVE YOU WISHED YOU WERE DEAD OR WISHED YOU COULD GO TO SLEEP AND NOT WAKE UP?: YES
3. HAVE YOU BEEN THINKING ABOUT HOW YOU MIGHT KILL YOURSELF?: YES
4. HAVE YOU HAD THESE THOUGHTS AND HAD SOME INTENTION OF ACTING ON THEM?: YES
2. HAVE YOU ACTUALLY HAD ANY THOUGHTS OF KILLING YOURSELF IN THE PAST MONTH?: YES
6. HAVE YOU EVER DONE ANYTHING, STARTED TO DO ANYTHING, OR PREPARED TO DO ANYTHING TO END YOUR LIFE?: YES

## 2024-04-16 ASSESSMENT — ACTIVITIES OF DAILY LIVING (ADL)
ADLS_ACUITY_SCORE: 39
ADLS_ACUITY_SCORE: 39

## 2024-04-16 NOTE — ED TRIAGE NOTES
Pt amb into ED was dropped off by cab from gp home. Pt states has been suicidal all day. States is med compliant. Does have a plan. Denies HI. Denies alcohol or street drugs. Let charge nurse know that pt needed to come back and pt is reluctant to sit and wait for a room     Triage Assessment (Adult)       Row Name 04/16/24 1415          Triage Assessment    Airway WDL WDL                      Spoke with patient who stated the pharmacy only filled #30 due to low stock. Told patient the pharmacy should have medications on hold or put refills on current Rx. He verbalized understanding.

## 2024-04-16 NOTE — ED PROVIDER NOTES
"EMERGENCY DEPARTMENT ENCOUNTER      NAME: Sadaf Ross  AGE: 23 year old female  YOB: 2000  MRN: [id number]  EVALUATION DATE & TIME: 4/16/2024  2:27 PM    PCP: [place]    ED PROVIDER: Manfred Muñoz M.D.      Chief Complaint   Patient presents with    Psychiatric Evaluation         FINAL IMPRESSION:  1. Behavior concern          ED COURSE & MEDICAL DECISION MAKING:    Pertinent Labs & Imaging studies reviewed below.  All EKGs below represent my independent interpretation.   ED Course as of 04/16/24 1750   Tue Apr 16, 2024   1456 I spoke to the DEC coordinator. ETA about 4:15.    1554 Patient is a 23-year-old woman who comes in by from her group home.  She reports being suicidal, wanting to hit her head into a wall or \"by herself and bleed to death.\".  She is angry/upset appearing.  I was able to review her chart, she has history of incredibly frequent emergency department visits.  In the past she has had a care plan that includes discharge list or any new or acute changes to her presentation.   1555 She has been seen in an emergency department 18 times in the last 16 days for various concerns.  I was able to review her previous care plans.   I spoke to the mental health .  She knows the patient.  Patient is willing to go home and was told that she needs to continue to work on her.  Stress management techniques.    Additional ED Course Timestamps:  2:31 PM I met with the patient, obtained history, performed an initial exam, and discussed options and plan for diagnostics and treatment here in the ED.     Medical Decision Making  Obtained supplemental history:Supplemental history obtained?: No  Reviewed external records: External records reviewed?: Documented in chart and Outpatient Record: EM visits this month (18 in the last 16 days)  Care impacted by chronic illness:Mental Health  Care significantly affected by social determinants of health:N/A  Did you consider but not order " "tests?: Work up considered but not performed and documented in chart, if applicable  Did you interpret images independently?: Independent interpretation of ECG and images noted in documentation, when applicable.  Consultation discussion with other provider:Did you involve another provider (consultant, , pharmacy, etc.)?: No  Discharge. No recommendations on prescription strength medication(s). N/A.    At the conclusion of the encounter I discussed the results of all of the tests and the disposition. The questions were answered. The patient or family acknowledged understanding and was agreeable with the care plan.     MEDICATIONS GIVEN IN THE EMERGENCY:  Medications - No data to display      NEW PRESCRIPTIONS STARTED AT TODAY'S ER VISIT  Discharge Medication List as of 4/16/2024  4:30 PM             =================================================================    HPI    Sadaf Ross is a 23 year old female who presents to this ED for evaluation of suicidal ideation.    Patient comes from a group home and has been feeling suicidal all day today. She says she wants to either bite herself to death or hit her head against the wall until her skull breaks. Patient has been having problems with another resident at the group home. She says she has come to the hospital for her mental health before but it's been a while. Patient is compliant with her meds.  Patient denies any physical symptoms or homicidal ideation.    Chart Review: Patient has had 21 other ED in the past 16 days.  4/15/24 ED visit at [facility] for chest pain. Symptoms had resolved by the time of examination. She requested discharge without additional testing.    VITALS:  /80   Pulse 71   Temp 98  F (36.7  C) (Oral)   Resp 16   Ht 1.6 m (5' 3\")   Wt 107.5 kg (237 lb)   SpO2 99%   BMI 41.98 kg/m      PHYSICAL EXAM    Constitutional: Well developed, well nourished. Comfortable appearing.   HENT: Normocephalic, atraumatic, mucous " membranes moist, nose normal  Eyes: PERRL, EOMI, conjunctiva normal, no discharge.  Neck- Supple, gross ROM intact.   Respiratory: Normal work of breathing, normal rate, speaks in full sentences  Cardiovascular: Normal heart rate  Musculoskeletal: Moving all 4 extremities intentionally and without pain.  Neurologic: Alert & oriented x 3, cranial nerves grossly intact. Normal gross coordination  Psychiatric: Affect normal, cooperative. Thoughts of self harm including biting herself and hitting her head against the wall.      I, Zainab Pelaez am serving as a scribe to document services personally performed by Dr. Manfred Muñoz based on my observation and the provider's statements to me. I, Manfred Muñoz MD attest that Zainab Pelaez is acting in a scribe capacity, has observed my performance of the services and has documented them in accordance with my direction.    Manfred Muñoz M.D.  Emergency Medicine  [facility] EMERGENCY DEPARTMENT  [facility] [facility] [id number]  [phone]  Dept: [phone]       Manfred Muñoz MD  04/16/24 9111

## 2024-04-16 NOTE — DISCHARGE INSTRUCTIONS
Please continue to work with your outpatient mental health providers and stress management and conflict resolution with your group home.

## 2024-04-16 NOTE — CONSULTS
"Diagnostic Evaluation Consultation  Crisis Assessment    Patient Name: Sadaf Ross  Age:  23 year old  Legal Sex: female  Gender Identity: female  Pronouns:   Race: White  Ethnicity: Not  or   Language: English      Patient was assessed: Virtual: foodpanda / hellofood Crisis Assessment Start Time: 1604 Crisis Assessment Stop Time: 1628  Patient location: [facility] EMERGENCY DEPARTMENT                             JNEDOT    Referral Data and Chief Complaint  Sadaf Ross presents to the ED by  self. Patient is presenting to the ED for the following concerns:  .   Factors that make the mental health crisis life threatening or complex are:  Pt is a 23 year old female who presents frequently to this ED for Mental health and to ED with Behavioral health concerns.  Patient has been seen daily and sometimes multiple times per day in the emergency room's all over the [facility] area for similar complaints.  Patient reported a altercation with her roommate and informed the provider that she was feeling suicidal but denied suicidality to this .  Patient lives in a group home and is her own guardian.  Patient called for a cab to bring her to the emergency room after feeling upset after the verbal altercation with roommate.  She is now feeling ready and able to return home.  Patient is denying any mental health symptoms, denies any current suicidal ideation, homicidal ideation or desire to harm herself. \"I got in a fight with my roommate, it was a miss communication and she got mad\".      Informed Consent and Assessment Methods  Explained the crisis assessment process, including applicable information disclosures and limits to confidentiality, assessed understanding of the process, and obtained consent to proceed with the assessment.  Assessment methods included conducting a formal interview with patient, review of medical records, collaboration with medical staff, and obtaining relevant " "collateral information from family and community providers when available.  : done     Patient response to interventions: verbalizes understanding  Coping skills were attempted to reduce the crisis:  Talked to  staff     History of the Crisis   Signficant history of ED visits with similar presentation (21 for April, Today is April 16th), [place]. Pt has hx of intellectual difficullty, borderline personality disorder, depression and PTSD.  No evidence or history of suicide attempts but does have random episodes of biting herself or scratching herself in a suicide \"attempt\".  Pt is functioning at baseline.    Brief Psychosocial History  Family:  Single, Children no  Support System:     Employment Status:  disabled  Source of Income:  disability  Financial Environmental Concerns:  none  Current Hobbies:  music, puzzles, group/social activities, television/movies/videos  Barriers in Personal Life:  cognitive limitations, mental health concerns    Significant Clinical History  Current Anxiety Symptoms:   (Denies)  Current Depression/Trauma:   (Denies)  Current Somatic Symptoms:   (Denies)  Current Psychosis/Thought Disturbance:  impulsive, distractability  Current Eating Symptoms:   (Denies)  Chemical Use History:  Alcohol: None  Benzodiazepines: None  Opiates: None  Cocaine: None  Marijuana: None  Other Use: None   Past diagnosis:  ADHD, Anxiety Disorder, Bipolar Disorder, Depression, Personality Disorder, Suicide attempt(s), PTSD  Family history:  Anxiety Disorder, Depression  Past treatment:  Individual therapy, Case management, Psychiatric Medication Management, Supportive Living Environment (group home, penitentiary house, etc), Inpatient Hospitalization, [facility]  Details of most recent treatment:  Pt lives in a group home ([facility]) that is staffed 24/7 ([phone]). Pt has a PCA.  She has OP therapy and psychiatry. has a " "behavioral Analyst and states she thinks she has a mental health  as well.  Other relevant history:  Patient has not been inpatient in over 5 years.       Collateral Information  Is there collateral information: Yes     Collateral information name, relationship, phone number:  Spoke with group home staff at [phone]    What happened today: Staff report that patient wanted to go to the emergency department and informed staff \"I am going to go to the ER and then I will be back soon\".  Staff appear frustrated that patient has been going to the emergency department daily     What is different about patient's functioning: Staff report patient is functioning at baseline     Concern about alcohol/drug use:      What do you think the patient needs:      Has patient made comments about wanting to kill themselves/others: no    If d/c is recommended, can they take part in safety/aftercare planning:  no    Additional collateral information:        Risk Assessment  Simpson Suicide Severity Rating Scale Full Clinical Version:  Suicidal Ideation  Q1 Wish to be Dead (Lifetime): Yes  Q2 Non-Specific Active Suicidal Thoughts (Lifetime): Yes  3. Active Suicidal Ideation with any Methods (Not Plan) Without Intent to Act (Lifetime): Yes  Q5 Active Suicidal Ideation with Specific Plan and Intent (Lifetime): Yes  Q6 Suicide Behavior (Lifetime): yes     Suicidal Behavior (Lifetime)  Actual Attempt (Lifetime): Yes  Total Number of Actual Attempts (Lifetime): 5  Actual Attempt Description (Lifetime): Cutting wrist with metal pieces from cars and with keys  Has subject engaged in non-suicidal self-injurious behavior? (Lifetime): Yes  Interrupted Attempts (Lifetime): Yes  Total Number of Interrupted Attempts (Lifetime): 0  Aborted or Self-Interrupted Attempt (Lifetime): Yes  Total Number of Aborted or Self-Interrupted Attempts (Lifetime): 5  Preparatory Acts or Behavior (Lifetime): No    Simpson Suicide Severity Rating Scale " Recent:   Suicidal Ideation (Recent)  Q1 Wished to be Dead (Past Month): yes  Q2 Suicidal Thoughts (Past Month): yes  Q3 Suicidal Thought Method: no  Q4 Suicidal Intent without Specific Plan: yes  Q5 Suicide Intent with Specific Plan: no  Within the Past 3 Months?: no  Level of Risk per Screen: high risk  Intensity of Ideation (Recent)  Most Severe Ideation Rating (Past 1 Month): 1  Frequency (Past 1 Month): Less than once a week  Duration (Past 1 Month): Fleeting, few seconds or minutes  Controllability (Past 1 Month): Easily able to control thoughts  Deterrents (Past 1 Month): Deterrents definitely stopped you from attempting suicide  Reasons for Ideation (Past 1 Month): Completely to get attention, revenge, or a reaction from others  Suicidal Behavior (Recent)  Actual Attempt (Past 3 Months): No  Total Number of Actual Attempts (Past 3 Months): 0  Has subject engaged in non-suicidal self-injurious behavior? (Past 3 Months): No  Interrupted Attempts (Past 3 Months): No  Total Number of Interrupted Attempts (Past 3 Months): 0  Aborted or Self-Interrupted Attempt (Past 3 Months): No  Total Number of Aborted or Self-Interrupted Attempts (Past 3 Months): 0  Preparatory Acts or Behavior (Past 3 Months): No    Environmental or Psychosocial Events: loss of a loved one, helplessness/hopelessness, impulsivity/recklessness, other life stressors  Protective Factors: Protective Factors: strong bond to family unit, community support, or employment, responsibilities and duties to others, including pets and children, lives in a responsibly safe and stable environment, help seeking, able to access care without barriers, reality testing ability, supportive ongoing medical and mental health care relationships, constructive use of leisure time, enjoyable activities, resilience    Does the patient have thoughts of harming others? Previous Attempt to Hurt Others: no  Current presentation:  (Patient is calm and cooperative)    Is the  patient engaging in sexually inappropriate behavior?           Mental Status Exam   Affect: Appropriate  Appearance: Appropriate  Attention Span/Concentration: Attentive  Eye Contact: Engaged    Fund of Knowledge: Appropriate   Language /Speech Content: Fluent  Language /Speech Volume: Normal  Language /Speech Rate/Productions: Normal  Recent Memory: Intact  Remote Memory: Intact  Mood: Normal  Orientation to Person: Yes   Orientation to Place: Yes  Orientation to Time of Day: Yes  Orientation to Date: Yes     Situation (Do they understand why they are here?): Yes  Psychomotor Behavior: Normal  Thought Content: Clear  Thought Form: Intact     Mini-Cog Assessment  Number of Words Recalled:    Clock-Drawing Test:     Three Item Recall:    Mini-Cog Total Score:       Medication  Psychotropic medications:   Medication Orders - Psychiatric (From admission, onward)      None             Current Care Team  Patient Care Team:  [facility] as PCP - General (Clinic)  Chloe Alva APRN CNP as Nurse Practitioner (OB/Gyn)  Seble Jones PA-C as Physician Assistant    Diagnosis  Patient Active Problem List   Diagnosis    MENTAL HEALTH    Suicidal ideation    Suicidal ideations    Intellectual disability    Bipolar affective disorder, currently depressed, moderate (H)    Borderline personality disorder (H)    Morbid obesity (H)    Unspecified mood (affective) disorder (H24)    Chronic constipation    History of suicide attempt    Hyperhidrosis    Major depressive disorder, recurrent, in full remission (H24)    Vitamin D deficiency       Primary Problem This Admission  Active Hospital Problems    Intellectual disability      *Borderline personality disorder (H)        Clinical Summary and Substantiation of Recommendations   After therapeutic assessment, intervention and aftercare planning by ED care team and LMHP and in consultation with attending provider, the patient's circumstances and mental state were  appropriate for outpatient management and return to group home. Pt appears to be functioning at baseline and is no longer upset about the fight she had with her roommate. Pt currently denies having suicidal ideations, intent or plans. She is able to safety plan. She declined to get referrals for IOP or [facility] programs. Pt is her own guardian. Pt appears satisfied with validation of feelings, coaching on calming and distraction activities and verbalizes understanding of plan. Patient voiced a desire to return home to her group home.                          Patient coping skills attempted to reduce the crisis:  Talked to  staff    Disposition  Recommended disposition: Individual Therapy, Group Home, Medication Management, Programmatic Care        Reviewed case and recommendations with attending provider. Attending Name:         Attending concurs with disposition:         Patient and/or validated legal guardian concurs with disposition:           Final disposition:  discharge    Legal status on admission: Voluntary/Patient has signed consent for treatment    Assessment Details   Total duration spent with the patient: 32 min     CPT code(s) utilized: 83311 - Psychotherapy for Crisis - 60 (30-74*) min    YOSHI Driver, Psychotherapist  DEC - Triage & Transition Services  Callback: [phone]

## 2024-04-18 ENCOUNTER — HOSPITAL ENCOUNTER (EMERGENCY)
Facility: HOSPITAL | Age: 24
Discharge: HOME | End: 2024-04-18
Attending: EMERGENCY MEDICINE | Admitting: EMERGENCY MEDICINE
Payer: MEDICARE

## 2024-04-18 VITALS
TEMPERATURE: 97.6 F | SYSTOLIC BLOOD PRESSURE: 144 MMHG | RESPIRATION RATE: 20 BRPM | OXYGEN SATURATION: 99 % | HEART RATE: 85 BPM | DIASTOLIC BLOOD PRESSURE: 79 MMHG

## 2024-04-18 DIAGNOSIS — F60.3 BORDERLINE PERSONALITY DISORDER (H): ICD-10-CM

## 2024-04-18 DIAGNOSIS — Z76.5 MALINGERING: ICD-10-CM

## 2024-04-18 PROCEDURE — 99282 EMERGENCY DEPT VISIT SF MDM: CPT

## 2024-04-18 NOTE — ED PROVIDER NOTES
"EMERGENCY DEPARTMENT ENCOUNTER      NAME: Sadaf Ross  AGE: 23 year old female  YOB: 2000  MRN: [id number]  EVALUATION DATE & TIME: 4/18/2024 12:15 PM    PCP: [facility]    ED PROVIDER: Do Hassan MD    Chief Complaint   Patient presents with    Mental Health Problem         FINAL IMPRESSION:  1. Malingering    2. Borderline personality disorder (H)          ED COURSE & MEDICAL DECISION MAKING:    Pertinent Labs & Imaging studies reviewed. (See chart for details)  23 year old female with history of borderline personality disorder, bipolar disorder and intellectual stability who presents to the Emergency Department for evaluation of mental health evaluation stating that she got into a verbal argument with her group home roommate last night and the group home told her to \"go stab yourself and die\".  Patient stated she picked up a scissors and said she felt like stabbing herself but on examination does not show any signs on the chest wall that she actually attempted this.  When patient confronted about the inconsistencies in her history, for example I spoke with the group home staff prior to seeing her and told her that the group home did not know anything about her ED visit, nor anything about the above story patient became agitated.  Stating \"if you do not want to help me then I will just fucking go\".  Patient informed that I do indeed want to help her, and I just asked her what I can do to help her on today's ED visit but patient shut down and would not participate.  She does not have any suicidal ideation, homicidal ideation.  Has numerous ED visits in our system and elsewhere in addition to PCP visits.  And this seems par with her chronic borderline personality disorder and attention seeking behavior.  Per her care plan, will discharge her back to group [place].  I do not think she needs a DEC assessment today.    ED Course as of 04/18/24 1247   Thu Apr 18, 2024   1216 Spoke w [facility] " staff, they didn't know she was here. She left for a routine dr echols. Staff deny the story about the roommate, though she does have a roommate. States they are friends. Mood overall has been stable. Compliant w meds overall.         Medical Decision Making    History:  Supplemental history from: Other: Group Home  External Record(s) reviewed: Outpatient Record: [facility] ED    Work Up:  Chart documentation includes differential considered and any EKGs or imaging independently interpreted by provider, see MDM  In additional to work up documented, I considered the following work up: see MDM    External consultation:  Discussion of management with another provider: N/A    Complicating factors:  Care impacted by chronic illness: Mental Health  Care affected by social determinants of health: Access to affordable healthcare    Disposition considerations: Discharge. No recommendations on prescription strength medication(s). See documentation for any additional details.        At the conclusion of the encounter I discussed the results of all of the tests and the disposition. The questions were answered. The patient or family acknowledged understanding and was agreeable with the care plan.    MEDICATIONS GIVEN IN THE EMERGENCY:  Medications - No data to display    NEW PRESCRIPTIONS STARTED AT TODAY'S ER VISIT  Discharge Medication List as of 4/18/2024 12:27 PM             =================================================================    HPI    Patient information was obtained from: patient     Use of Intrepreter: N/A        Sadaf Ross is a 23 year old female with pertinent medical history of intellectual disability and multiple mental health diagnoses with history of suicide attempts who presents with mental health issue.    The patient states she comes from a group home. Last night she was talking with her roommate about her roommate's sister. The patient told her roommate that she does not care to hear about the  "roommate's sister. Then, the roommate told her that her sister \"does not care if you die\". This made the patient upset and she attempted to \"stab myself in the heart\". She was on the phone with her step mother and [phone] (suicide crisis line) and telling them that she was going to try to stab her heart. When EMS did not come she felt that \"no cares then [if I die]\".     Per group home,  The patient's group home was called prior to seeing the patient. They were not aware of any intrapersonal conflict within the group home like the patient mentioned in triage. She had told group home staff that she had a scheduled appointment and was going to the clinic. She came here instead.       Chart Review:  4/16/2024: The patient has a care plan in place, she has had 94 visits since 1/1/2024 with 22 visits in the last 18 days. At her most recent visit 2 days ago she had been feeling suicidal and was having issues with another resident at her group home. In the past, the patient has frequently come to emergency departments because she does not like her current group home. Care plan instructions are to evaluate the patient and \"if there are not acute symptoms\" the patient is to be discharged back to her group home.       PAST MEDICAL HISTORY:  Past Medical History:   Diagnosis Date    ADHD (attention deficit hyperactivity disorder)     Bipolar 1 disorder (H)     Borderline personality disorder (H)     Depression     Depressive disorder     Intellectual disability     Obesity     Syncope        PAST SURGICAL HISTORY:  Past Surgical History:   Procedure Laterality Date    APPENDECTOMY      APPENDECTOMY         CURRENT MEDICATIONS:    Prior to Admission Medications   Prescriptions Last Dose Informant Patient Reported? Taking?   ARIPiprazole lauroxil ER (ARISTADA) 882 MG/3.2ML intra-muscular   Yes No   Sig: Inject 882 mg into the muscle every 28 days On the 22nd of each month   FLUoxetine (PROZAC) 40 MG capsule   Yes No   Sig: Take 80 " mg by mouth daily   LORazepam (ATIVAN) 0.5 MG tablet   Yes No   Sig: Take 0.5 mg by mouth once as needed for anxiety Give as needed any time she has the urge to call 911 or to visit the ER   OLANZapine zydis (ZYPREXA) 5 MG ODT   No No   Sig: Take 1 tablet (5 mg) by mouth At Bedtime   Vitamin D, Cholecalciferol, 25 MCG (1000 UT) TABS  Care Giver Yes No   Sig: Take 1,000 Units by mouth daily    acetaminophen (TYLENOL) 325 MG tablet   Yes No   Sig: Take 650 mg by mouth every 6 hours as needed for mild pain   albuterol (PROAIR HFA/PROVENTIL HFA/VENTOLIN HFA) 108 (90 Base) MCG/ACT inhaler   No No   Sig: Inhale 2 puffs into the lungs every 6 hours as needed for shortness of breath, wheezing or cough   bisacodyl (DULCOLAX) 5 MG EC tablet   No No   Sig: Take 1 tablet (5 mg) by mouth daily as needed for constipation   calcium carbonate (TUMS) 500 MG chewable tablet  Care Giver Yes No   Sig: Take 1 chew tab by mouth 2 times daily as needed for heartburn    cyclobenzaprine (FLEXERIL) 10 MG tablet   Yes No   Sig: Take 10 mg by mouth 3 times daily as needed for muscle spasms   dicyclomine (BENTYL) 20 MG tablet   Yes No   Sig: Take 20 mg by mouth 2 times daily as needed (dyspepsia)   docusate sodium (COLACE) 50 MG capsule   Yes No   Sig: Take 100 mg by mouth 2 times daily   famotidine (PEPCID) 20 MG tablet   Yes No   Sig: Take 20 mg by mouth daily   hydrOXYzine (ATARAX) 50 MG tablet  Care Giver Yes No   Sig: Take 50 mg by mouth 2 times daily as needed for anxiety   hydrocortisone, Perianal, (HYDROCORTISONE) 2.5 % cream   No No   Sig: Place rectally 2 times daily as needed for hemorrhoids   ibuprofen (ADVIL/MOTRIN) 200 MG tablet   No No   Sig: Take 2 tablets (400 mg) by mouth every 6 hours as needed for pain   loperamide (IMODIUM A-D) 2 MG tablet   No No   Sig: Take 2 tablets (4 mg) in the morning.  Take 1 tablet (2 mg) with every loose stool.  Do not exceed 8 tablets in a day.   mupirocin (BACTROBAN) 2 % external ointment   No No    Sig: Apply topically 3 times daily   nitroFURantoin macrocrystal-monohydrate (MACROBID) 100 MG capsule   No No   Sig: Take 1 capsule (100 mg) by mouth 2 times daily   ondansetron (ZOFRAN) 4 MG tablet   Yes No   Sig: Take 4 mg by mouth every 8 hours as needed for nausea   ondansetron (ZOFRAN) 4 MG tablet   No No   Sig: Take 1 tablet (4 mg) by mouth every 8 hours as needed   pantoprazole (PROTONIX) 40 MG EC tablet   Yes No   Sig: Take 40 mg by mouth daily   polyethylene glycol (MIRALAX) 17 GM/Dose powder   Yes No   Sig: Take 17 g by mouth daily   promethazine (PHENERGAN) 12.5 MG tablet   Yes No   Sig: Take 12.5 mg by mouth every 4 hours   sennosides (SENOKOT) 8.6 MG tablet   Yes No   Sig: Take 1 tablet by mouth 2 times daily as needed for constipation   traZODone (DESYREL) 50 MG tablet   Yes No   Sig: Take 100 mg by mouth At Bedtime      Facility-Administered Medications: None       ALLERGIES:  Allergies   Allergen Reactions    Penicillins Rash and Unknown       FAMILY HISTORY:  Family History   Problem Relation Age of Onset    Diabetes Type 1 Father     Cancer Paternal Grandfather        SOCIAL HISTORY:  Social History     Tobacco Use    Smoking status: Some Days     Current packs/day: 0.25     Average packs/day: 0.3 packs/day for 5.0 years (1.3 ttl pk-yrs)     Types: Cigarettes, Vaping Device    Smokeless tobacco: Never   Substance Use Topics    Alcohol use: No    Drug use: No        VITALS:  Patient Vitals for the past 24 hrs:   BP Temp Temp src Pulse Resp SpO2   04/18/24 1208 (!) 144/79 97.6  F (36.4  C) Oral 85 20 99 %       PHYSICAL EXAM    General Appearance: Well-appearing, well-nourished, no acute distress   Eyes:  conjunctiva/corneas clear  Chest:  No tenderness or deformity, no crepitus. Chest exam benign. No evidence of stab wounds, ecchymosis, or erythema to suggest that the patient attempted to stab herself in chest.   Cardio:  Regular rate and rhythm  Pulm:  No respiratory distress  Abdomen:  "Obese  Extremities: Moves all extremities normally, normal gait   Skin:  Skin warm, dry, no rashes  Neuro:  Alert and oriented ×3, moving all extremities, no gross sensory defects. After discussing inconsistencies of patient's versus group home's stories the patient became agitated, repeatedly saying \"if you don't care, send me home\".      RADIOLOGY/LABS:  Reviewed all pertinent imaging. Please see official radiology report. All pertinent labs reviewed and interpreted.             The creation of this record is based on the scribe s observations of the work being performed by Do Hassan MD and the provider s statements to them. It was created on her behalf by Nolan Santiago, a trained medical scribe. This document has been checked and approved by the attending provider.    Do Hassan MD  Emergency Medicine  [facility] EMERGENCY DEPARTMENT  [facility] [facility] [address]  [phone]  Dept: [phone]     Do Hassan MD  04/18/24 1252    "

## 2024-04-18 NOTE — ED TRIAGE NOTES
"Patient here after a verbal altercation with her roommate last night. Roommate told her to \"go stab yourself and die\" and patient is having difficulty coping with this. Patient picked up scissors and said she felt like stabbing herself. Denies SI. Pt lives in a group home and staff is aware of altercation.       "

## 2024-04-20 ENCOUNTER — HOSPITAL ENCOUNTER (EMERGENCY)
Facility: CLINIC | Age: 24
Discharge: HOME | End: 2024-04-20
Attending: EMERGENCY MEDICINE | Admitting: EMERGENCY MEDICINE
Payer: MEDICARE

## 2024-04-20 VITALS
TEMPERATURE: 98.5 F | BODY MASS INDEX: 41.99 KG/M2 | WEIGHT: 237 LBS | RESPIRATION RATE: 17 BRPM | OXYGEN SATURATION: 98 % | DIASTOLIC BLOOD PRESSURE: 71 MMHG | SYSTOLIC BLOOD PRESSURE: 106 MMHG | HEART RATE: 94 BPM | HEIGHT: 63 IN

## 2024-04-20 DIAGNOSIS — Z76.89 FREQUENT PATIENT IN EMERGENCY DEPARTMENT: ICD-10-CM

## 2024-04-20 DIAGNOSIS — K59.09 CHRONIC CONSTIPATION: ICD-10-CM

## 2024-04-20 LAB
ALBUMIN UR-MCNC: NEGATIVE MG/DL
APPEARANCE UR: CLEAR
BACTERIA #/AREA URNS HPF: ABNORMAL /HPF
BILIRUB UR QL STRIP: NEGATIVE
COLOR UR AUTO: ABNORMAL
GLUCOSE UR STRIP-MCNC: NEGATIVE MG/DL
HGB UR QL STRIP: NEGATIVE
KETONES UR STRIP-MCNC: NEGATIVE MG/DL
LEUKOCYTE ESTERASE UR QL STRIP: NEGATIVE
MUCOUS THREADS #/AREA URNS LPF: PRESENT /LPF
NITRATE UR QL: NEGATIVE
PH UR STRIP: 5.5 [PH] (ref 5–7)
RBC URINE: 1 /HPF
SP GR UR STRIP: 1.03 (ref 1–1.03)
SQUAMOUS EPITHELIAL: 1 /HPF
UROBILINOGEN UR STRIP-MCNC: NORMAL MG/DL
WBC URINE: 1 /HPF

## 2024-04-20 PROCEDURE — 81001 URINALYSIS AUTO W/SCOPE: CPT | Performed by: EMERGENCY MEDICINE

## 2024-04-20 PROCEDURE — 99283 EMERGENCY DEPT VISIT LOW MDM: CPT | Performed by: EMERGENCY MEDICINE

## 2024-04-20 PROCEDURE — 99284 EMERGENCY DEPT VISIT MOD MDM: CPT | Performed by: EMERGENCY MEDICINE

## 2024-04-20 ASSESSMENT — COLUMBIA-SUICIDE SEVERITY RATING SCALE - C-SSRS
6. HAVE YOU EVER DONE ANYTHING, STARTED TO DO ANYTHING, OR PREPARED TO DO ANYTHING TO END YOUR LIFE?: YES
2. HAVE YOU ACTUALLY HAD ANY THOUGHTS OF KILLING YOURSELF IN THE PAST MONTH?: NO
1. IN THE PAST MONTH, HAVE YOU WISHED YOU WERE DEAD OR WISHED YOU COULD GO TO SLEEP AND NOT WAKE UP?: NO

## 2024-04-20 ASSESSMENT — ACTIVITIES OF DAILY LIVING (ADL): ADLS_ACUITY_SCORE: 39

## 2024-04-21 ENCOUNTER — HOSPITAL ENCOUNTER (EMERGENCY)
Facility: CLINIC | Age: 24
Discharge: HOME | End: 2024-04-21
Attending: EMERGENCY MEDICINE | Admitting: EMERGENCY MEDICINE
Payer: MEDICARE

## 2024-04-21 VITALS
RESPIRATION RATE: 16 BRPM | DIASTOLIC BLOOD PRESSURE: 75 MMHG | TEMPERATURE: 98.2 F | SYSTOLIC BLOOD PRESSURE: 139 MMHG | OXYGEN SATURATION: 99 % | HEART RATE: 92 BPM

## 2024-04-21 DIAGNOSIS — R10.84 GENERALIZED ABDOMINAL PAIN: ICD-10-CM

## 2024-04-21 PROCEDURE — 99284 EMERGENCY DEPT VISIT MOD MDM: CPT | Performed by: EMERGENCY MEDICINE

## 2024-04-21 PROCEDURE — 99283 EMERGENCY DEPT VISIT LOW MDM: CPT | Performed by: EMERGENCY MEDICINE

## 2024-04-21 RX ORDER — POLYETHYLENE GLYCOL 3350 17 G/17G
17 POWDER, FOR SOLUTION ORAL DAILY
Qty: 510 G | Refills: 0 | Status: SHIPPED | OUTPATIENT
Start: 2024-04-21 | End: 2024-05-19

## 2024-04-21 RX ORDER — POLYETHYLENE GLYCOL 3350 17 G/17G
17 POWDER, FOR SOLUTION ORAL DAILY
Qty: 510 G | Refills: 0 | Status: SHIPPED | OUTPATIENT
Start: 2024-04-21 | End: 2024-04-21

## 2024-04-21 ASSESSMENT — COLUMBIA-SUICIDE SEVERITY RATING SCALE - C-SSRS
2. HAVE YOU ACTUALLY HAD ANY THOUGHTS OF KILLING YOURSELF IN THE PAST MONTH?: YES
6. HAVE YOU EVER DONE ANYTHING, STARTED TO DO ANYTHING, OR PREPARED TO DO ANYTHING TO END YOUR LIFE?: YES
1. IN THE PAST MONTH, HAVE YOU WISHED YOU WERE DEAD OR WISHED YOU COULD GO TO SLEEP AND NOT WAKE UP?: YES
3. HAVE YOU BEEN THINKING ABOUT HOW YOU MIGHT KILL YOURSELF?: YES
5. HAVE YOU STARTED TO WORK OUT OR WORKED OUT THE DETAILS OF HOW TO KILL YOURSELF? DO YOU INTEND TO CARRY OUT THIS PLAN?: YES

## 2024-04-21 ASSESSMENT — ACTIVITIES OF DAILY LIVING (ADL): ADLS_ACUITY_SCORE: 39

## 2024-04-21 NOTE — ED PROVIDER NOTES
ED Provider Note  April 21, 2024  [facility]      History     Chief Complaint: Abdominal Pain and Suicidal      HPI  Sadaf Ross is a 23 year old female presenting to the ED, returning after evening visit recently again noting some abdominal discomfort.    Please see extensive note by Dr. Pelaez.  She had an extensive workup      Abdomen is soft and nontender.  Vital signs are stable.     my suspicion for an acute intra-abdominal process is very low.  I will refill her MiraLAX and sent to [facility].    She does endorse some suicidality but carefully tells me that she has no plan.  This is consistent with her chronic suicidal thoughts.        Past Medical History  Past Medical History:   Diagnosis Date    ADHD (attention deficit hyperactivity disorder)     Bipolar 1 disorder (H)     Borderline personality disorder (H)     Depression     Depressive disorder     Intellectual disability     Obesity     Syncope      Past Surgical History:   Procedure Laterality Date    APPENDECTOMY      APPENDECTOMY       polyethylene glycol (MIRALAX) 17 GM/Dose powder  acetaminophen (TYLENOL) 325 MG tablet  albuterol (PROAIR HFA/PROVENTIL HFA/VENTOLIN HFA) 108 (90 Base) MCG/ACT inhaler  ARIPiprazole lauroxil ER (ARISTADA) 882 MG/3.2ML intra-muscular  bisacodyl (DULCOLAX) 5 MG EC tablet  calcium carbonate (TUMS) 500 MG chewable tablet  cyclobenzaprine (FLEXERIL) 10 MG tablet  dicyclomine (BENTYL) 20 MG tablet  docusate sodium (COLACE) 50 MG capsule  famotidine (PEPCID) 20 MG tablet  FLUoxetine (PROZAC) 40 MG capsule  hydrocortisone, Perianal, (HYDROCORTISONE) 2.5 % cream  hydrOXYzine (ATARAX) 50 MG tablet  ibuprofen (ADVIL/MOTRIN) 200 MG tablet  loperamide (IMODIUM A-D) 2 MG tablet  LORazepam (ATIVAN) 0.5 MG tablet  mupirocin (BACTROBAN) 2 % external ointment  nitroFURantoin macrocrystal-monohydrate (MACROBID) 100 MG capsule  OLANZapine zydis (ZYPREXA) 5 MG ODT  ondansetron (ZOFRAN) 4 MG  tablet  ondansetron (ZOFRAN) 4 MG tablet  pantoprazole (PROTONIX) 40 MG EC tablet  promethazine (PHENERGAN) 12.5 MG tablet  sennosides (SENOKOT) 8.6 MG tablet  traZODone (DESYREL) 50 MG tablet  Vitamin D, Cholecalciferol, 25 MCG (1000 UT) TABS      Allergies   Allergen Reactions    Penicillin G     Penicillins Rash and Unknown     Family History  Family History   Problem Relation Age of Onset    Diabetes Type 1 Father     Cancer Paternal Grandfather      Social History   Social History     Tobacco Use    Smoking status: Some Days     Current packs/day: 0.25     Average packs/day: 0.3 packs/day for 5.0 years (1.3 ttl pk-yrs)     Types: Cigarettes, Vaping Device    Smokeless tobacco: Never   Substance Use Topics    Alcohol use: No    Drug use: No        Past medical history, past surgical history, medications, allergies, family history, and social history were reviewed with the patient. No additional pertinent items.      A medically appropriate review of systems was performed with pertinent positives and negatives noted in the HPI, and all other systems negative.    Physical Exam   /75   Pulse 92   Temp 98.2  F (36.8  C)   Resp 16   SpO2 99%   Abdomen soft nontender    ED Course, Procedures, & Data      Procedures                    No results found for any visits on 04/21/24.  Medications - No data to display  Labs Ordered and Resulted from Time of ED Arrival to Time of ED Departure - No data to display  No orders to display            Medical Decision Making Matrix          Assessment & Plan    Chronic suicidal thoughts  Abdominal pain, since recent workup,    A broad ddx was considered as above. Given the pts presentation, clinical course, and workup, my suspicion for an emergent process is low. Pt improved on serial exams. Appropriate for outpt follow up with [facility].      I have reviewed the nursing notes. I have reviewed the findings, diagnosis, plan and need for follow up with the patient.    Current  Discharge Medication List          Final diagnoses:   Generalized abdominal pain       Miguelangel Peacock MD  [facility] EMERGENCY DEPARTMENT  April 21, 2024       Miguelangel Peacock MD  04/21/24 7610

## 2024-04-21 NOTE — ED PROVIDER NOTES
ED Provider Note  [facility]      History     Chief Complaint   Patient presents with    Flank Pain     Bilateral side pain started about a hour ago.denies fever 9/10 pain     HPI    Sadaf Ross is a 23 year old female who is extremely well-known to this emergency department for multiple frequent visits who now comes in complaining of bilateral side pain.  Patient had a history of ADHD, bipolar disorder, depression, borderline personality disorder, cognitive impairment, history of chronic SIB, chronic suicidal ideation for which she is at a group home.  Also hx of chronic constipation, delayed gastric emptying, anxiety. This group home has 24/7 staffing.  She has multiple frequent ER visits.  As an example, today is April 20, 2024 and just in the month of April she has had 24 ED visits.  She was seen on 4/18/2024 and diagnosed with malingering and borderline personality disorder.  According to the chart review done on 4/16/24, she has had 94 visits to the emergency department since January 1 of this year.    She was seen in urgent care yesterday with complaint of vomiting.  Evaluation yesterday included a CBC which showed white count of 10.8, otherwise within normal limits.  Lipase is normal, hCG negative.  CMP was within normal limits.  UA showed moderate bacteria however this appeared to be contaminated with squamous cells.  There were 0-5 white cells, 0-3 red cells, and negative nitrites and LE.  Urine culture is pending per note.  She was treated with p.o. Zofran and then IM Compazine for nausea.  She was then discharged back home to her group home.    Patient states that today she was feeling fine and was making her dinner this evening.  She started to develop some bilateral side pain which was crampy in nature.  She does endorse constipation which is a chronic problem for her.  States that the last time she had a bowel movement was several days ago, then she had a small hard  bowel movement this evening after which she called 911.  She denies any blood in the stool.  She denies any nausea or vomiting today.  Patient reports she takes MiraLAX once a day along with senna for her chronic constipation.  Denies fevers or chills, no nasal congestion or sore throat, no cough, shortness of breath, or chest pain.  Past surgical history includes appendectomy.      Past Medical History:   Diagnosis Date    ADHD (attention deficit hyperactivity disorder)     Bipolar 1 disorder (H)     Borderline personality disorder (H)     Depression     Depressive disorder     Intellectual disability     Obesity     Syncope        Past Surgical History:   Procedure Laterality Date    APPENDECTOMY      APPENDECTOMY         Family History   Problem Relation Age of Onset    Diabetes Type 1 Father     Cancer Paternal Grandfather        Social History     Tobacco Use    Smoking status: Some Days     Current packs/day: 0.25     Average packs/day: 0.3 packs/day for 5.0 years (1.3 ttl pk-yrs)     Types: Cigarettes, Vaping Device    Smokeless tobacco: Never   Substance Use Topics    Alcohol use: No         Past Medical History  Past Medical History:   Diagnosis Date    ADHD (attention deficit hyperactivity disorder)     Bipolar 1 disorder (H)     Borderline personality disorder (H)     Depression     Depressive disorder     Intellectual disability     Obesity     Syncope      Past Surgical History:   Procedure Laterality Date    APPENDECTOMY      APPENDECTOMY       acetaminophen (TYLENOL) 325 MG tablet  albuterol (PROAIR HFA/PROVENTIL HFA/VENTOLIN HFA) 108 (90 Base) MCG/ACT inhaler  ARIPiprazole lauroxil ER (ARISTADA) 882 MG/3.2ML intra-muscular  bisacodyl (DULCOLAX) 5 MG EC tablet  calcium carbonate (TUMS) 500 MG chewable tablet  cyclobenzaprine (FLEXERIL) 10 MG tablet  dicyclomine (BENTYL) 20 MG tablet  docusate sodium (COLACE) 50 MG capsule  famotidine (PEPCID) 20 MG tablet  FLUoxetine (PROZAC) 40 MG  "capsule  hydrocortisone, Perianal, (HYDROCORTISONE) 2.5 % cream  hydrOXYzine (ATARAX) 50 MG tablet  ibuprofen (ADVIL/MOTRIN) 200 MG tablet  loperamide (IMODIUM A-D) 2 MG tablet  LORazepam (ATIVAN) 0.5 MG tablet  mupirocin (BACTROBAN) 2 % external ointment  nitroFURantoin macrocrystal-monohydrate (MACROBID) 100 MG capsule  OLANZapine zydis (ZYPREXA) 5 MG ODT  ondansetron (ZOFRAN) 4 MG tablet  ondansetron (ZOFRAN) 4 MG tablet  pantoprazole (PROTONIX) 40 MG EC tablet  polyethylene glycol (MIRALAX) 17 GM/Dose powder  promethazine (PHENERGAN) 12.5 MG tablet  sennosides (SENOKOT) 8.6 MG tablet  traZODone (DESYREL) 50 MG tablet  Vitamin D, Cholecalciferol, 25 MCG (1000 UT) TABS      Allergies   Allergen Reactions    Penicillin G     Penicillins Rash and Unknown     Family History  Family History   Problem Relation Age of Onset    Diabetes Type 1 Father     Cancer Paternal Grandfather      Social History   Social History     Tobacco Use    Smoking status: Some Days     Current packs/day: 0.25     Average packs/day: 0.3 packs/day for 5.0 years (1.3 ttl pk-yrs)     Types: Cigarettes, Vaping Device    Smokeless tobacco: Never   Substance Use Topics    Alcohol use: No    Drug use: No         A medically appropriate review of systems was performed with pertinent positives and negatives noted in the HPI, and all other systems negative.    Physical Exam   BP: 120/79  Pulse: 92  Temp: 99.1  F (37.3  C)  Resp: 18  Height: 160 cm (5' 3\")  Weight: 107.5 kg (237 lb)  SpO2: 100 %  Physical Exam  Vitals and nursing note reviewed.   Constitutional:       General: She is not in acute distress.     Appearance: She is not diaphoretic.      Comments: Well appearing adult female, alert, cooperative, NAD   HENT:      Head: Atraumatic.      Mouth/Throat:      Mouth: Mucous membranes are moist.      Pharynx: Oropharynx is clear. No oropharyngeal exudate.   Eyes:      General: No scleral icterus.     Pupils: Pupils are equal, round, and reactive " to light.   Cardiovascular:      Rate and Rhythm: Normal rate.      Pulses: Normal pulses.      Heart sounds: Normal heart sounds. No murmur heard.  Pulmonary:      Effort: No respiratory distress.      Breath sounds: Normal breath sounds.   Abdominal:      General: Bowel sounds are normal.      Palpations: Abdomen is soft.      Tenderness: There is no abdominal tenderness.      Comments: Obese, soft, minimal tenderness to deep palpation in left upper quadrant without guarding or rebound.  Hypoactive bowel sounds.  No tenderness elsewhere   Musculoskeletal:         General: No tenderness.   Skin:     General: Skin is warm.      Findings: No rash.   Neurological:      Mental Status: She is alert.         ED Course, Procedures, & Data      Procedures            Results for orders placed or performed during the hospital encounter of 04/20/24   UA with Microscopic reflex to Culture     Status: Abnormal    Specimen: Urine, Midstream   Result Value Ref Range    Color Urine Light Yellow Colorless, Straw, Light Yellow, Yellow    Appearance Urine Clear Clear    Glucose Urine Negative Negative mg/dL    Bilirubin Urine Negative Negative    Ketones Urine Negative Negative mg/dL    Specific Gravity Urine 1.030 1.003 - 1.035    Blood Urine Negative Negative    pH Urine 5.5 5.0 - 7.0    Protein Albumin Urine Negative Negative mg/dL    Urobilinogen Urine Normal Normal, 2.0 mg/dL    Nitrite Urine Negative Negative    Leukocyte Esterase Urine Negative Negative    Bacteria Urine Few (A) None Seen /HPF    Mucus Urine Present (A) None Seen /LPF    RBC Urine 1 <=2 /HPF    WBC Urine 1 <=5 /HPF    Squamous Epithelials Urine 1 <=1 /HPF    Narrative    Urine Culture not indicated     Medications - No data to display  Labs Ordered and Resulted from Time of ED Arrival to Time of ED Departure   ROUTINE UA WITH MICROSCOPIC REFLEX TO CULTURE - Abnormal       Result Value    Color Urine Light Yellow      Appearance Urine Clear      Glucose Urine  Negative      Bilirubin Urine Negative      Ketones Urine Negative      Specific Gravity Urine 1.030      Blood Urine Negative      pH Urine 5.5      Protein Albumin Urine Negative      Urobilinogen Urine Normal      Nitrite Urine Negative      Leukocyte Esterase Urine Negative      Bacteria Urine Few (*)     Mucus Urine Present (*)     RBC Urine 1      WBC Urine 1      Squamous Epithelials Urine 1       No orders to display          Critical care was not performed.     Medical Decision Making  The patient's presentation was of moderate complexity (acute presentation chronic medical problem complicated by severe persistent mental illness).    The patient's evaluation involved:  review of external note(s) from 3+ sources (see separate area of note for details)  review of 3+ test result(s) ordered prior to this encounter (see separate area of note for details)  ordering and/or review of 1 test(s) in this encounter (see separate area of note for details)    The patient's management necessitated only low risk treatment.    Assessment & Plan    Patient presents to the emergency department today for the above complaints.  Records reviewed, she is very well-known to this emergency department for multiple visits both for mental health relatively minor medical issues.  She was seen yesterday in urgent care and those records were reviewed.  She had UA at urgent care yesterday which is in the process of culture but she does not have any dysuria, hematuria, or urinary frequency which would be suggestive of urinary tract infection.  She had a negative hCG yesterday.     More than likely I suspect she has constipation which is causing her issues.  She does have chronic constipation at baseline.  Patient reports she is taking 1 tablet of Senokot twice daily, she does have Colace on her list but it does not appear she is taking this.  She is also taking 1 capful of MiraLAX daily.    I do not believe that extensive workup is  necessary at this time given reassuring labs yesterday.  Do not believe she requires any abdominal imaging at this time.  I will instruct her to increase her fluid intake, and she can take MiraLAX up to twice a day as needed.  She should continue with her senna, she also has Dulcolax which she can take as needed.  She also has bentyl on her list. I have advised her to follow-up with her primary clinic if symptoms are persistent. Patient verbalizes understanding.     I have reviewed the nursing notes. I have reviewed the findings, diagnosis, plan and need for follow up with the patient.    Discharge Medication List as of 4/20/2024  8:11 PM          Final diagnoses:   Chronic constipation   Frequent patient in emergency department       Deepa Pelaez MD  [facility] EMERGENCY DEPARTMENT  4/20/2024     Deepa Pelaez MD  04/20/24 5189

## 2024-04-21 NOTE — DISCHARGE INSTRUCTIONS
Please make an appointment to follow up with Your Primary Care Provider in 7-10 days unless symptoms completely resolve.    Return to the emergency department for any worsening back pain, abdominal pain, fevers or chills, burning with urination, nausea or vomiting, weakness or any other concerns as given or discussed.

## 2024-04-21 NOTE — ED TRIAGE NOTES
Patient was brought to ED by ambulance with c/o mid abdominal pain. She told EMS she's constipated because the doctor told her yesterday. Patient has not started the prescribed medication. Patient also c/o suicidal thoughts. She said it started since she was discharged yesterday. Plan to bite her arm.      Triage Assessment (Adult)       Row Name 04/21/24 0402          Triage Assessment    Airway WDL WDL        Respiratory WDL    Respiratory WDL WDL        Skin Circulation/Temperature WDL    Skin Circulation/Temperature WDL WDL        Cardiac WDL    Cardiac WDL WDL        Peripheral/Neurovascular WDL    Peripheral Neurovascular WDL WDL        Cognitive/Neuro/Behavioral WDL    Cognitive/Neuro/Behavioral WDL all     Level of Consciousness alert     Arousal Level opens eyes spontaneously     Orientation oriented x 4     Speech clear;spontaneous     Mood/Behavior cooperative        Pupils (CN II)    Pupil PERRLA yes

## 2024-04-21 NOTE — DISCHARGE INSTRUCTIONS
You have been seen in the emergency department today for your symptoms.  This is likely due to your chronic constipation.  You have multiple medications at your disposal that you can take for constipation.  We recommend that you take your MiraLAX twice a day to help with constipation, if you start to develop diarrhea from this, you can go back to once a day.  You also should take your Senokot and your Colace as needed for constipation.  Please increase your fluid consumption as this can help with constipation.  Regular physical activity including just brisk walking can also help.    Please follow-up with your primary clinic for ongoing issues with chronic constipation.    Your urine sample today did not show any sign of infection.

## 2024-04-21 NOTE — ED NOTES
Bed: ED06  Expected date: 4/21/24  Expected time: 3:55 AM  Means of arrival:   Comments:  N 192 23F constipation

## 2024-04-25 ENCOUNTER — HOSPITAL ENCOUNTER (EMERGENCY)
Facility: CLINIC | Age: 24
Discharge: HOME | End: 2024-04-25
Attending: EMERGENCY MEDICINE | Admitting: EMERGENCY MEDICINE
Payer: MEDICARE

## 2024-04-25 VITALS
TEMPERATURE: 97.9 F | DIASTOLIC BLOOD PRESSURE: 76 MMHG | HEART RATE: 92 BPM | RESPIRATION RATE: 16 BRPM | SYSTOLIC BLOOD PRESSURE: 116 MMHG | OXYGEN SATURATION: 99 %

## 2024-04-25 DIAGNOSIS — R11.10 VOMITING, UNSPECIFIED VOMITING TYPE, UNSPECIFIED WHETHER NAUSEA PRESENT: ICD-10-CM

## 2024-04-25 PROCEDURE — 99283 EMERGENCY DEPT VISIT LOW MDM: CPT | Performed by: EMERGENCY MEDICINE

## 2024-04-25 PROCEDURE — 99282 EMERGENCY DEPT VISIT SF MDM: CPT | Performed by: EMERGENCY MEDICINE

## 2024-04-25 ASSESSMENT — COLUMBIA-SUICIDE SEVERITY RATING SCALE - C-SSRS
2. HAVE YOU ACTUALLY HAD ANY THOUGHTS OF KILLING YOURSELF IN THE PAST MONTH?: YES
3. HAVE YOU BEEN THINKING ABOUT HOW YOU MIGHT KILL YOURSELF?: YES
6. HAVE YOU EVER DONE ANYTHING, STARTED TO DO ANYTHING, OR PREPARED TO DO ANYTHING TO END YOUR LIFE?: YES
4. HAVE YOU HAD THESE THOUGHTS AND HAD SOME INTENTION OF ACTING ON THEM?: NO
5. HAVE YOU STARTED TO WORK OUT OR WORKED OUT THE DETAILS OF HOW TO KILL YOURSELF? DO YOU INTEND TO CARRY OUT THIS PLAN?: NO
1. IN THE PAST MONTH, HAVE YOU WISHED YOU WERE DEAD OR WISHED YOU COULD GO TO SLEEP AND NOT WAKE UP?: YES

## 2024-04-25 ASSESSMENT — ACTIVITIES OF DAILY LIVING (ADL): ADLS_ACUITY_SCORE: 39

## 2024-04-26 ENCOUNTER — HOSPITAL ENCOUNTER (EMERGENCY)
Facility: CLINIC | Age: 24
Discharge: HOME | End: 2024-04-26
Attending: FAMILY MEDICINE | Admitting: FAMILY MEDICINE
Payer: MEDICARE

## 2024-04-26 ENCOUNTER — LAB REQUISITION (OUTPATIENT)
Dept: LAB | Facility: CLINIC | Age: 24
End: 2024-04-26
Payer: MEDICARE

## 2024-04-26 VITALS
TEMPERATURE: 99 F | OXYGEN SATURATION: 99 % | HEART RATE: 96 BPM | SYSTOLIC BLOOD PRESSURE: 119 MMHG | RESPIRATION RATE: 16 BRPM | WEIGHT: 255.3 LBS | DIASTOLIC BLOOD PRESSURE: 83 MMHG | BODY MASS INDEX: 45.22 KG/M2

## 2024-04-26 DIAGNOSIS — R35.0 FREQUENCY OF MICTURITION: ICD-10-CM

## 2024-04-26 DIAGNOSIS — R21 RASH AND NONSPECIFIC SKIN ERUPTION: ICD-10-CM

## 2024-04-26 PROCEDURE — 87591 N.GONORRHOEAE DNA AMP PROB: CPT | Mod: ORL | Performed by: NURSE PRACTITIONER

## 2024-04-26 PROCEDURE — 87491 CHLMYD TRACH DNA AMP PROBE: CPT | Mod: ORL | Performed by: NURSE PRACTITIONER

## 2024-04-26 PROCEDURE — 99283 EMERGENCY DEPT VISIT LOW MDM: CPT | Performed by: FAMILY MEDICINE

## 2024-04-26 RX ORDER — TRIAMCINOLONE ACETONIDE 1 MG/G
CREAM TOPICAL 2 TIMES DAILY
Status: DISCONTINUED | OUTPATIENT
Start: 2024-04-26 | End: 2024-04-26 | Stop reason: HOSPADM

## 2024-04-26 ASSESSMENT — ACTIVITIES OF DAILY LIVING (ADL): ADLS_ACUITY_SCORE: 39

## 2024-04-26 NOTE — DISCHARGE INSTRUCTIONS
Discharge back to group home plan on using triamcinolone on your rash and following up with your primary MD for recheck next week

## 2024-04-26 NOTE — ED PROVIDER NOTES
ED Provider Note  [facility]      History     Chief Complaint   Patient presents with    Rash     BIBA from group home. Rash on her bottom. Concerned if reaction to Aristada shot she received on 23.      HPI  Sadaf Ross is a 23 year old female who is extremely well-known to this emergency department for multiple frequent visits who now presents via EMS from her group home for evaluation of rash.     Past Medical History  Past Medical History:   Diagnosis Date    ADHD (attention deficit hyperactivity disorder)     Bipolar 1 disorder (H)     Borderline personality disorder (H)     Depression     Depressive disorder     Intellectual disability     Obesity     Syncope      Past Surgical History:   Procedure Laterality Date    APPENDECTOMY      APPENDECTOMY       acetaminophen (TYLENOL) 325 MG tablet  albuterol (PROAIR HFA/PROVENTIL HFA/VENTOLIN HFA) 108 (90 Base) MCG/ACT inhaler  ARIPiprazole lauroxil ER (ARISTADA) 882 MG/3.2ML intra-muscular  bisacodyl (DULCOLAX) 5 MG EC tablet  calcium carbonate (TUMS) 500 MG chewable tablet  cyclobenzaprine (FLEXERIL) 10 MG tablet  dicyclomine (BENTYL) 20 MG tablet  docusate sodium (COLACE) 50 MG capsule  famotidine (PEPCID) 20 MG tablet  FLUoxetine (PROZAC) 40 MG capsule  hydrocortisone, Perianal, (HYDROCORTISONE) 2.5 % cream  hydrOXYzine (ATARAX) 50 MG tablet  ibuprofen (ADVIL/MOTRIN) 200 MG tablet  loperamide (IMODIUM A-D) 2 MG tablet  LORazepam (ATIVAN) 0.5 MG tablet  mupirocin (BACTROBAN) 2 % external ointment  nitroFURantoin macrocrystal-monohydrate (MACROBID) 100 MG capsule  OLANZapine zydis (ZYPREXA) 5 MG ODT  ondansetron (ZOFRAN) 4 MG tablet  ondansetron (ZOFRAN) 4 MG tablet  pantoprazole (PROTONIX) 40 MG EC tablet  polyethylene glycol (MIRALAX) 17 GM/Dose powder  promethazine (PHENERGAN) 12.5 MG tablet  sennosides (SENOKOT) 8.6 MG tablet  traZODone (DESYREL) 50 MG tablet  Vitamin D, Cholecalciferol, 25 MCG (1000 UT) TABS      Allergies    Allergen Reactions    Penicillin G     Penicillins Rash and Unknown     Family History  Family History   Problem Relation Age of Onset    Diabetes Type 1 Father     Cancer Paternal Grandfather      Social History   Social History     Tobacco Use    Smoking status: Some Days     Current packs/day: 0.25     Average packs/day: 0.3 packs/day for 5.0 years (1.3 ttl pk-yrs)     Types: Cigarettes, Vaping Device    Smokeless tobacco: Never   Substance Use Topics    Alcohol use: No    Drug use: No         A medically appropriate review of systems was performed with pertinent positives and negatives noted in the HPI, and all other systems negative.    Physical Exam   BP: 119/83  Pulse: 96  Temp: 99  F (37.2  C)  Resp: 16  Weight: 115.8 kg (255 lb 4.8 oz)  SpO2: 99 %  Physical Exam  Constitutional:       General: She is not in acute distress.     Appearance: Normal appearance. She is not diaphoretic.   HENT:      Head: Atraumatic.      Mouth/Throat:      Mouth: Mucous membranes are moist.   Eyes:      General: No scleral icterus.     Conjunctiva/sclera: Conjunctivae normal.   Cardiovascular:      Rate and Rhythm: Normal rate.      Heart sounds: Normal heart sounds.   Pulmonary:      Effort: No respiratory distress.      Breath sounds: Normal breath sounds.   Abdominal:      General: Abdomen is flat.   Musculoskeletal:      Cervical back: Neck supple.   Skin:     General: Skin is warm.      Findings: Rash present.      Comments: Patient had several small erythematous papules on her buttocks possible early folliculitis   Neurological:      Mental Status: She is alert.           ED Course, Procedures, & Data      Procedures       Results for orders placed or performed in visit on 04/26/24   Chlamydia trachomatis PCR     Status: Normal    Specimen: Urine, Voided   Result Value Ref Range    Chlamydia trachomatis Negative Negative   Neisseria gonorrhoeae PCR     Status: Normal    Specimen: Urine, Voided   Result Value Ref Range     Neisseria gonorrhoeae Negative Negative     Medications - No data to display  Labs Ordered and Resulted from Time of ED Arrival to Time of ED Departure - No data to display  No orders to display          Critical care was not performed.     Medical Decision Making  The patient's presentation was of low complexity (an acute and uncomplicated illness or injury).    The patient's evaluation involved:  history and exam without other MDM data elements    The patient's management necessitated moderate risk (prescription drug management including medications given in the ED).    Assessment & Plan        I have reviewed the nursing notes. I have reviewed the findings, diagnosis, plan and need for follow up with the patient.    Discharge Medication List as of 4/26/2024  5:10 PM          Final diagnoses:   Rash and nonspecific skin eruption         [facility] EMERGENCY DEPARTMENT  4/26/2024     Robert Olea MD  04/28/24 3617

## 2024-04-26 NOTE — ED NOTES
Spoke to Arlene at patient's group home to notify patient will be coming back now, patient is feeling better and has been cleared medically, setting up ride at this time for patient to go back.

## 2024-04-26 NOTE — ED NOTES
Bed: UREDH-F  Expected date:   Expected time:   Means of arrival:   Comments:  [id number] ETA 15mins. Green  23 F SI

## 2024-04-26 NOTE — ED TRIAGE NOTES
Pt BIBA, C/o nausea, vomiting, and abdominal pain. Reports having emesis x3 at group home. Chronic suicidal thoughts w/ no plan, does not want to speak to mental health .      Triage Assessment (Adult)       Row Name 04/25/24 9507          Triage Assessment    Airway WDL WDL        Respiratory WDL    Respiratory WDL WDL        Skin Circulation/Temperature WDL    Skin Circulation/Temperature WDL WDL        Cardiac WDL    Cardiac WDL WDL        Peripheral/Neurovascular WDL    Peripheral Neurovascular WDL WDL        Cognitive/Neuro/Behavioral WDL    Cognitive/Neuro/Behavioral WDL WDL

## 2024-04-26 NOTE — DISCHARGE INSTRUCTIONS
May go back to residence tonight    Advance diet as tolerated and keep hydrated    Please make an appointment to follow up with Your Primary Care Provider in 1-2 days if not improving.
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
## 2024-04-27 LAB
C TRACH DNA SPEC QL NAA+PROBE: NEGATIVE
N GONORRHOEA DNA SPEC QL NAA+PROBE: NEGATIVE

## 2024-04-29 ENCOUNTER — APPOINTMENT (OUTPATIENT)
Dept: GENERAL RADIOLOGY | Facility: CLINIC | Age: 24
End: 2024-04-29
Attending: EMERGENCY MEDICINE
Payer: MEDICARE

## 2024-04-29 ENCOUNTER — NURSE TRIAGE (OUTPATIENT)
Dept: NURSING | Facility: CLINIC | Age: 24
End: 2024-04-29

## 2024-04-29 ENCOUNTER — LAB REQUISITION (OUTPATIENT)
Dept: LAB | Facility: CLINIC | Age: 24
End: 2024-04-29
Payer: MEDICARE

## 2024-04-29 ENCOUNTER — HOSPITAL ENCOUNTER (EMERGENCY)
Facility: CLINIC | Age: 24
Discharge: NURSING, REHAB OR LONG-TERM CARE | End: 2024-04-29
Attending: EMERGENCY MEDICINE | Admitting: EMERGENCY MEDICINE
Payer: MEDICARE

## 2024-04-29 VITALS
OXYGEN SATURATION: 98 % | HEART RATE: 100 BPM | RESPIRATION RATE: 16 BRPM | BODY MASS INDEX: 45.22 KG/M2 | SYSTOLIC BLOOD PRESSURE: 114 MMHG | DIASTOLIC BLOOD PRESSURE: 81 MMHG | TEMPERATURE: 98.3 F | HEIGHT: 63 IN

## 2024-04-29 DIAGNOSIS — M25.562 ACUTE PAIN OF BOTH KNEES: ICD-10-CM

## 2024-04-29 DIAGNOSIS — M25.561 ACUTE PAIN OF BOTH KNEES: ICD-10-CM

## 2024-04-29 DIAGNOSIS — R35.0 FREQUENCY OF MICTURITION: ICD-10-CM

## 2024-04-29 LAB
ALBUMIN UR-MCNC: 10 MG/DL
ANION GAP SERPL CALCULATED.3IONS-SCNC: 12 MMOL/L (ref 7–15)
APPEARANCE UR: CLEAR
BILIRUB UR QL STRIP: NEGATIVE
BUN SERPL-MCNC: 13.1 MG/DL (ref 6–20)
CALCIUM SERPL-MCNC: 9.7 MG/DL (ref 8.6–10)
CHLORIDE SERPL-SCNC: 104 MMOL/L (ref 98–107)
COLOR UR AUTO: ABNORMAL
CREAT SERPL-MCNC: 0.88 MG/DL (ref 0.51–0.95)
DEPRECATED HCO3 PLAS-SCNC: 22 MMOL/L (ref 22–29)
EGFRCR SERPLBLD CKD-EPI 2021: >90 ML/MIN/1.73M2
GLUCOSE SERPL-MCNC: 100 MG/DL (ref 70–99)
GLUCOSE UR STRIP-MCNC: NEGATIVE MG/DL
HGB UR QL STRIP: NEGATIVE
KETONES UR STRIP-MCNC: NEGATIVE MG/DL
LEUKOCYTE ESTERASE UR QL STRIP: NEGATIVE
MUCOUS THREADS #/AREA URNS LPF: PRESENT /LPF
NITRATE UR QL: NEGATIVE
PH UR STRIP: 7.5 [PH] (ref 5–7)
POTASSIUM SERPL-SCNC: 4.5 MMOL/L (ref 3.4–5.3)
RBC URINE: 1 /HPF
SODIUM SERPL-SCNC: 138 MMOL/L (ref 135–145)
SP GR UR STRIP: 1.03 (ref 1–1.03)
SQUAMOUS EPITHELIAL: <1 /HPF
TSH SERPL DL<=0.005 MIU/L-ACNC: 2.28 UIU/ML (ref 0.3–4.2)
UROBILINOGEN UR STRIP-MCNC: NORMAL MG/DL
WBC URINE: 1 /HPF

## 2024-04-29 PROCEDURE — 81001 URINALYSIS AUTO W/SCOPE: CPT | Performed by: EMERGENCY MEDICINE

## 2024-04-29 PROCEDURE — 80048 BASIC METABOLIC PNL TOTAL CA: CPT | Mod: ORL | Performed by: NURSE PRACTITIONER

## 2024-04-29 PROCEDURE — 99284 EMERGENCY DEPT VISIT MOD MDM: CPT | Performed by: EMERGENCY MEDICINE

## 2024-04-29 PROCEDURE — 84443 ASSAY THYROID STIM HORMONE: CPT | Mod: ORL | Performed by: NURSE PRACTITIONER

## 2024-04-29 PROCEDURE — 250N000013 HC RX MED GY IP 250 OP 250 PS 637: Performed by: EMERGENCY MEDICINE

## 2024-04-29 PROCEDURE — 73562 X-RAY EXAM OF KNEE 3: CPT | Mod: LT

## 2024-04-29 RX ORDER — IBUPROFEN 600 MG/1
600 TABLET, FILM COATED ORAL ONCE
Status: COMPLETED | OUTPATIENT
Start: 2024-04-29 | End: 2024-04-29

## 2024-04-29 RX ORDER — ACETAMINOPHEN 500 MG
1000 TABLET ORAL ONCE
Status: COMPLETED | OUTPATIENT
Start: 2024-04-29 | End: 2024-04-29

## 2024-04-29 RX ADMIN — ACETAMINOPHEN 1000 MG: 500 TABLET ORAL at 22:33

## 2024-04-29 RX ADMIN — IBUPROFEN 600 MG: 600 TABLET, FILM COATED ORAL at 22:33

## 2024-04-29 ASSESSMENT — COLUMBIA-SUICIDE SEVERITY RATING SCALE - C-SSRS
1. IN THE PAST MONTH, HAVE YOU WISHED YOU WERE DEAD OR WISHED YOU COULD GO TO SLEEP AND NOT WAKE UP?: NO
6. HAVE YOU EVER DONE ANYTHING, STARTED TO DO ANYTHING, OR PREPARED TO DO ANYTHING TO END YOUR LIFE?: YES
2. HAVE YOU ACTUALLY HAD ANY THOUGHTS OF KILLING YOURSELF IN THE PAST MONTH?: NO

## 2024-04-29 ASSESSMENT — ACTIVITIES OF DAILY LIVING (ADL)
ADLS_ACUITY_SCORE: 39
ADLS_ACUITY_SCORE: 39
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
## 2024-04-30 NOTE — ED PROVIDER NOTES
[facility] EMERGENCY DEPARTMENT ([facility])    4/29/24      ED PROVIDER NOTE    History     Chief Complaint   Patient presents with    Extremity Weakness     Patient c/o weakness and pain to bilateral knee since morning.      HPI  Sadaf Ross is a 23 year old female BIBA from  presenting with extremity weakness. She does have an ED care plan in place d/t frequent visits. She reports bilateral lower extremity pain and weakness, particularly around the knees. This started yesterday. She notes it hurts when she walks. No falls or trauma. No unusual activities recently. No history of similar pain or previous knee issues. No rash or bruising noted on the skin. She also reports urinary frequency and burning with urination. No other symptoms noted.     Past Medical History  Past Medical History:   Diagnosis Date    ADHD (attention deficit hyperactivity disorder)     Bipolar 1 disorder (H)     Borderline personality disorder (H)     Depression     Depressive disorder     Intellectual disability     Obesity     Syncope      Past Surgical History:   Procedure Laterality Date    APPENDECTOMY      APPENDECTOMY       acetaminophen (TYLENOL) 325 MG tablet  ARIPiprazole lauroxil ER (ARISTADA) 882 MG/3.2ML intra-muscular  bisacodyl (DULCOLAX) 5 MG EC tablet  calcium carbonate (TUMS) 500 MG chewable tablet  cyclobenzaprine (FLEXERIL) 10 MG tablet  dicyclomine (BENTYL) 20 MG tablet  docusate sodium (COLACE) 50 MG capsule  famotidine (PEPCID) 20 MG tablet  FLUoxetine (PROZAC) 40 MG capsule  hydrOXYzine (ATARAX) 50 MG tablet  ibuprofen (ADVIL/MOTRIN) 200 MG tablet  loperamide (IMODIUM A-D) 2 MG tablet  LORazepam (ATIVAN) 0.5 MG tablet  OLANZapine zydis (ZYPREXA) 5 MG ODT  ondansetron (ZOFRAN) 4 MG tablet  ondansetron (ZOFRAN) 4 MG tablet  pantoprazole (PROTONIX) 40 MG EC tablet  polyethylene glycol (MIRALAX) 17 GM/Dose powder  promethazine (PHENERGAN) 12.5 MG tablet  sennosides (SENOKOT) 8.6 MG tablet  traZODone  "(DESYREL) 50 MG tablet  Vitamin D, Cholecalciferol, 25 MCG (1000 UT) TABS  albuterol (PROAIR HFA/PROVENTIL HFA/VENTOLIN HFA) 108 (90 Base) MCG/ACT inhaler  hydrocortisone, Perianal, (HYDROCORTISONE) 2.5 % cream  mupirocin (BACTROBAN) 2 % external ointment  nitroFURantoin macrocrystal-monohydrate (MACROBID) 100 MG capsule      Allergies   Allergen Reactions    Penicillin G     Penicillins Rash and Unknown     Family History  Family History   Problem Relation Age of Onset    Diabetes Type 1 Father     Cancer Paternal Grandfather      Social History   Social History     Tobacco Use    Smoking status: Some Days     Current packs/day: 0.25     Average packs/day: 0.3 packs/day for 5.0 years (1.3 ttl pk-yrs)     Types: Cigarettes, Vaping Device    Smokeless tobacco: Former   Substance Use Topics    Alcohol use: No    Drug use: No         A medically appropriate review of systems was performed with pertinent positives and negatives noted in the HPI, and all other systems negative.     Physical Exam   BP: 114/81  Pulse: 100  Temp: 98.3  F (36.8  C)  Resp: 16  Height: 160 cm (5' 3\")  SpO2: 98 %      Physical Exam  Vitals and nursing note reviewed.   Constitutional:       General: She is not in acute distress.     Appearance: Normal appearance.   HENT:      Head: Normocephalic.      Nose: Nose normal.   Eyes:      Pupils: Pupils are equal, round, and reactive to light.   Cardiovascular:      Rate and Rhythm: Normal rate and regular rhythm.   Pulmonary:      Effort: Pulmonary effort is normal.   Abdominal:      General: There is no distension.   Musculoskeletal:         General: No deformity. Normal range of motion.      Cervical back: Normal range of motion.   Skin:     General: Skin is warm.   Neurological:      Mental Status: She is alert and oriented to person, place, and time.   Psychiatric:         Mood and Affect: Mood normal.         ED Results and Procedures     Results for orders placed or performed during the " hospital encounter of 04/29/24 (from the past 24 hour(s))   XR Knee Right 3 Views    Narrative    EXAM: XR KNEE RIGHT 3 VIEWS  LOCATION: [facility]  DATE: 4/29/2024    INDICATION: R knee pain  COMPARISON: None.      Impression    IMPRESSION: Normal joint spaces and alignment. No fracture or joint effusion.   XR Knee Left 3 Views    Narrative    EXAM: XR KNEE LEFT 3 VIEWS  LOCATION: [facility]  DATE: 4/29/2024    INDICATION: L knee pain  COMPARISON: None.      Impression    IMPRESSION: Normal joint spaces and alignment. No fracture or joint effusion.   UA with Microscopic reflex to Culture    Specimen: Urine, Clean Catch   Result Value Ref Range    Color Urine Light Yellow Colorless, Straw, Light Yellow, Yellow    Appearance Urine Clear Clear    Glucose Urine Negative Negative mg/dL    Bilirubin Urine Negative Negative    Ketones Urine Negative Negative mg/dL    Specific Gravity Urine 1.028 1.003 - 1.035    Blood Urine Negative Negative    pH Urine 7.5 (H) 5.0 - 7.0    Protein Albumin Urine 10 (A) Negative mg/dL    Urobilinogen Urine Normal Normal, 2.0 mg/dL    Nitrite Urine Negative Negative    Leukocyte Esterase Urine Negative Negative    Mucus Urine Present (A) None Seen /LPF    RBC Urine 1 <=2 /HPF    WBC Urine 1 <=5 /HPF    Squamous Epithelials Urine <1 <=1 /HPF    Narrative    Urine Culture not indicated     Medications   ibuprofen (ADVIL/MOTRIN) tablet 600 mg (600 mg Oral $Given 4/29/24 2233)   acetaminophen (TYLENOL) tablet 1,000 mg (1,000 mg Oral $Given 4/29/24 2233)           ED Course/Assessments & Plan (with Medical Decision Making)   Patient, well-known to the ED, presents with bilateral knee pain.  On arrival, she is normal vital signs.  Is in no acute distress.  Describes pain to her bilateral knees without any specific traumatic injury or inciting incident.  Exam without evidence of ligament laxity, rash, DVT,  or other acute pathology.  X-ray reviewed independently and is negative for obvious osteoarthritis, fracture or dislocation.  She also notes urinary frequency, however, urinalysis without evidence of acute UTI.    Patient now requesting discharge.  Overall clinical picture consistent with likely malingering behavior, very typical for this patient.  Discharged in good condition back to group home.      I have reviewed the nursing notes.    I have reviewed the findings, diagnosis, plan and need for follow up with the patient.    Critical care was not performed.     Medical Decision Making  The patient's presentation was of moderate complexity (an undiagnosed new problem with uncertain diagnosis).    The patient's evaluation involved:  ordering and/or review of 3+ test(s) in this encounter (see separate area of note for details)    The patient's management necessitated moderate risk (limitations due to social determinants of health (strong pattern of malingering behavior, frequent ED visits, low cognitive functioning)).    Discharge Medication List as of 4/29/2024 11:06 PM          Final diagnoses:   Acute pain of both knees       GHASSAN CARSON DO    [facility] EMERGENCY DEPARTMENT      4/29/2024            Ghassan Carson DO  04/29/24 2331

## 2024-04-30 NOTE — ED TRIAGE NOTES
Patient was BIB ambulance from . She c/o bilateral knee pain and weakness. She said it started this morning.      Triage Assessment (Adult)       Row Name 04/29/24 0949          Triage Assessment    Airway WDL WDL        Respiratory WDL    Respiratory WDL WDL        Skin Circulation/Temperature WDL    Skin Circulation/Temperature WDL WDL        Cardiac WDL    Cardiac WDL WDL        Peripheral/Neurovascular WDL    Peripheral Neurovascular WDL WDL        Cognitive/Neuro/Behavioral WDL    Cognitive/Neuro/Behavioral WDL WDL

## 2024-05-05 NOTE — ED NOTES
Left an after hours voice mail message for patient's , Lesley Perez, requesting a return phone call.    Case Manger:  Lesley Perez [facility]  [phone]    's supervisor, Saniya Vickers [phone].    Nuria Deleon LICSW  [facility] Clinical Supervisor    
Pt is refusing to take meds or allow an EKG. She has allowed a search  
Pt is sitting in a chair yelling at the MD. She is refusing to answer questions. She is refusing a search. She has been given her hold paperwork but has thrown it on the ground  
Pt refuse to check her vital signs   
76

## 2024-05-06 ENCOUNTER — HOSPITAL ENCOUNTER (EMERGENCY)
Facility: CLINIC | Age: 24
Discharge: NURSING, REHAB OR LONG-TERM CARE | End: 2024-05-06
Attending: FAMILY MEDICINE | Admitting: FAMILY MEDICINE
Payer: MEDICARE

## 2024-05-06 VITALS
DIASTOLIC BLOOD PRESSURE: 77 MMHG | HEIGHT: 63 IN | WEIGHT: 250 LBS | TEMPERATURE: 97.3 F | RESPIRATION RATE: 16 BRPM | OXYGEN SATURATION: 100 % | HEART RATE: 83 BPM | SYSTOLIC BLOOD PRESSURE: 115 MMHG | BODY MASS INDEX: 44.3 KG/M2

## 2024-05-06 DIAGNOSIS — F41.9 ANXIETY: ICD-10-CM

## 2024-05-06 DIAGNOSIS — K52.9 GASTROENTERITIS: ICD-10-CM

## 2024-05-06 PROCEDURE — 99283 EMERGENCY DEPT VISIT LOW MDM: CPT | Performed by: FAMILY MEDICINE

## 2024-05-06 PROCEDURE — 250N000011 HC RX IP 250 OP 636: Performed by: FAMILY MEDICINE

## 2024-05-06 RX ORDER — ONDANSETRON 4 MG/1
4 TABLET, ORALLY DISINTEGRATING ORAL ONCE
Status: COMPLETED | OUTPATIENT
Start: 2024-05-06 | End: 2024-05-06

## 2024-05-06 RX ORDER — LORAZEPAM 1 MG/1
1 TABLET ORAL ONCE
Status: DISCONTINUED | OUTPATIENT
Start: 2024-05-06 | End: 2024-05-06

## 2024-05-06 RX ADMIN — ONDANSETRON 4 MG: 4 TABLET, ORALLY DISINTEGRATING ORAL at 10:25

## 2024-05-06 ASSESSMENT — ACTIVITIES OF DAILY LIVING (ADL): ADLS_ACUITY_SCORE: 39

## 2024-05-06 NOTE — ED PROVIDER NOTES
ED Provider Note  [facility]      History     Chief Complaint   Patient presents with    Nausea & Vomiting     Vomiting 3 times since last evening, abd pain and chills     HPI  Sadaf Ross is a 23 year old female with a history of depression, bipolar 1, and BPD presenting to the Emergency Department for abdominal pain, nausea, and vomiting.  She was at the [facility] emergency department yesterday (5/5/2024) for abdominal pain and all labs were unremarkable so she was discharged.  Patient continues to have some nausea today and has felt as though the nausea and vomiting is not resolving on its own.  Patient is open to the idea of trying Zofran she has increased underlying anxiety and was offered Zyprexa but at this time feels as though her anxiety is actually manageable.    Past Medical History  Past Medical History:   Diagnosis Date    ADHD (attention deficit hyperactivity disorder)     Bipolar 1 disorder (H)     Borderline personality disorder (H)     Depression     Depressive disorder     Intellectual disability     Obesity     Syncope      Past Surgical History:   Procedure Laterality Date    APPENDECTOMY      APPENDECTOMY       acetaminophen (TYLENOL) 325 MG tablet  albuterol (PROAIR HFA/PROVENTIL HFA/VENTOLIN HFA) 108 (90 Base) MCG/ACT inhaler  ARIPiprazole lauroxil ER (ARISTADA) 882 MG/3.2ML intra-muscular  bisacodyl (DULCOLAX) 5 MG EC tablet  calcium carbonate (TUMS) 500 MG chewable tablet  cyclobenzaprine (FLEXERIL) 10 MG tablet  dicyclomine (BENTYL) 20 MG tablet  docusate sodium (COLACE) 50 MG capsule  famotidine (PEPCID) 20 MG tablet  FLUoxetine (PROZAC) 40 MG capsule  hydrocortisone, Perianal, (HYDROCORTISONE) 2.5 % cream  hydrOXYzine (ATARAX) 50 MG tablet  ibuprofen (ADVIL/MOTRIN) 200 MG tablet  loperamide (IMODIUM A-D) 2 MG tablet  LORazepam (ATIVAN) 0.5 MG tablet  mupirocin (BACTROBAN) 2 % external ointment  nitroFURantoin macrocrystal-monohydrate (MACROBID) 100 MG  "capsule  OLANZapine zydis (ZYPREXA) 5 MG ODT  ondansetron (ZOFRAN) 4 MG tablet  ondansetron (ZOFRAN) 4 MG tablet  pantoprazole (PROTONIX) 40 MG EC tablet  polyethylene glycol (MIRALAX) 17 GM/Dose powder  promethazine (PHENERGAN) 12.5 MG tablet  sennosides (SENOKOT) 8.6 MG tablet  traZODone (DESYREL) 50 MG tablet  Vitamin D, Cholecalciferol, 25 MCG (1000 UT) TABS      Allergies   Allergen Reactions    Penicillin G     Penicillins Rash and Unknown     Family History  Family History   Problem Relation Age of Onset    Diabetes Type 1 Father     Cancer Paternal Grandfather      Social History   Social History     Tobacco Use    Smoking status: Some Days     Current packs/day: 0.25     Average packs/day: 0.3 packs/day for 5.0 years (1.3 ttl pk-yrs)     Types: Cigarettes, Vaping Device    Smokeless tobacco: Former   Substance Use Topics    Alcohol use: No    Drug use: No         A medically appropriate review of systems was performed with pertinent positives and negatives noted in the HPI, and all other systems negative.    Physical Exam   BP: 117/82  Pulse: 91  Temp: 98.2  F (36.8  C)  Resp: 16  Height: 160 cm (5' 3\")  Weight: 113.4 kg (250 lb)  SpO2: 99 %  Physical Exam  Constitutional:       General: She is not in acute distress.     Appearance: Normal appearance. She is not diaphoretic.   HENT:      Head: Atraumatic.      Mouth/Throat:      Mouth: Mucous membranes are moist.   Eyes:      General: No scleral icterus.     Conjunctiva/sclera: Conjunctivae normal.   Cardiovascular:      Rate and Rhythm: Normal rate.      Heart sounds: Normal heart sounds.   Pulmonary:      Effort: No respiratory distress.      Breath sounds: Normal breath sounds.   Abdominal:      General: Abdomen is flat.   Musculoskeletal:      Cervical back: Neck supple.   Skin:     General: Skin is warm.      Findings: No rash.   Neurological:      General: No focal deficit present.      Mental Status: She is alert and oriented to person, place, and " time.      Sensory: No sensory deficit.      Motor: No weakness.      Coordination: Coordination normal.   Psychiatric:         Mood and Affect: Mood is anxious.         Speech: Speech normal.         Behavior: Behavior is cooperative.         Thought Content: Thought content does not include homicidal or suicidal ideation.           ED Course, Procedures, & Data      Procedures        No results found for any visits on 05/06/24.  Medications   ondansetron (ZOFRAN ODT) ODT tab 4 mg (4 mg Oral $Given 5/6/24 1025)     Labs Ordered and Resulted from Time of ED Arrival to Time of ED Departure - No data to display  No orders to display          Critical care was not performed.     Medical Decision Making  The patient's presentation was of moderate complexity (an acute illness with systemic symptoms).    The patient's evaluation involved:  history and exam without other MDM data elements    The patient's management necessitated moderate risk (prescription drug management including medications given in the ED).    Assessment & Plan        I have reviewed the nursing notes. I have reviewed the findings, diagnosis, plan and need for follow up with the patient.    Patient with chronic mental health concerns at this time presenting primarily with nausea had workup at [facility] with negative evaluation at this time has a nonsurgical abdomen and nausea has resolved with 1 dose of Zofran.  Patient states that she has Zofran at her group home and is requesting discharge with medic transport arranged by her.    Final diagnoses:   Gastroenteritis   Anxiety       Robert Olea  [facility] EMERGENCY DEPARTMENT  5/6/2024     Robert Olea MD  05/08/24 3272

## 2024-05-06 NOTE — ED TRIAGE NOTES
Vomited 3 times since last evening, abd pain and chills. Denies taking any medications at home to help with pain and symptoms.     Triage Assessment (Adult)       Row Name 05/06/24 1009          Triage Assessment    Airway WDL WDL        Respiratory WDL    Respiratory WDL WDL        Skin Circulation/Temperature WDL    Skin Circulation/Temperature WDL WDL        Cardiac WDL    Cardiac WDL WDL        Peripheral/Neurovascular WDL    Peripheral Neurovascular WDL WDL        Cognitive/Neuro/Behavioral WDL    Cognitive/Neuro/Behavioral WDL WDL                      No

## 2024-05-06 NOTE — DISCHARGE INSTRUCTIONS
Discharge back to group home continue with current outpatient cares May use Zofran for nausea as needed

## 2024-05-08 ENCOUNTER — HOSPITAL ENCOUNTER (EMERGENCY)
Facility: CLINIC | Age: 24
Discharge: HOME | End: 2024-05-08
Attending: STUDENT IN AN ORGANIZED HEALTH CARE EDUCATION/TRAINING PROGRAM | Admitting: STUDENT IN AN ORGANIZED HEALTH CARE EDUCATION/TRAINING PROGRAM
Payer: MEDICARE

## 2024-05-08 ENCOUNTER — APPOINTMENT (OUTPATIENT)
Dept: GENERAL RADIOLOGY | Facility: CLINIC | Age: 24
End: 2024-05-08
Attending: PHYSICIAN ASSISTANT
Payer: MEDICARE

## 2024-05-08 VITALS
HEART RATE: 85 BPM | TEMPERATURE: 97.9 F | SYSTOLIC BLOOD PRESSURE: 135 MMHG | OXYGEN SATURATION: 99 % | RESPIRATION RATE: 16 BRPM | DIASTOLIC BLOOD PRESSURE: 98 MMHG

## 2024-05-08 DIAGNOSIS — R55 SYNCOPE, UNSPECIFIED SYNCOPE TYPE: ICD-10-CM

## 2024-05-08 LAB
ALBUMIN SERPL BCG-MCNC: 4.6 G/DL (ref 3.5–5.2)
ALBUMIN UR-MCNC: NEGATIVE MG/DL
ALP SERPL-CCNC: 73 U/L (ref 40–150)
ALT SERPL W P-5'-P-CCNC: 16 U/L (ref 0–50)
ANION GAP SERPL CALCULATED.3IONS-SCNC: 9 MMOL/L (ref 7–15)
APPEARANCE UR: CLEAR
AST SERPL W P-5'-P-CCNC: 17 U/L (ref 0–45)
ATRIAL RATE - MUSE: 83 BPM
BACTERIA #/AREA URNS HPF: ABNORMAL /HPF
BASOPHILS # BLD AUTO: 0.1 10E3/UL (ref 0–0.2)
BASOPHILS NFR BLD AUTO: 1 %
BILIRUB SERPL-MCNC: 0.2 MG/DL
BILIRUB UR QL STRIP: NEGATIVE
BUN SERPL-MCNC: 10.5 MG/DL (ref 6–20)
CALCIUM SERPL-MCNC: 8.9 MG/DL (ref 8.6–10)
CHLORIDE SERPL-SCNC: 103 MMOL/L (ref 98–107)
COLOR UR AUTO: ABNORMAL
CREAT SERPL-MCNC: 0.88 MG/DL (ref 0.51–0.95)
DEPRECATED HCO3 PLAS-SCNC: 25 MMOL/L (ref 22–29)
DIASTOLIC BLOOD PRESSURE - MUSE: NORMAL MMHG
EGFRCR SERPLBLD CKD-EPI 2021: >90 ML/MIN/1.73M2
EOSINOPHIL # BLD AUTO: 0.1 10E3/UL (ref 0–0.7)
EOSINOPHIL NFR BLD AUTO: 1 %
ERYTHROCYTE [DISTWIDTH] IN BLOOD BY AUTOMATED COUNT: 13.3 % (ref 10–15)
FLUAV RNA SPEC QL NAA+PROBE: NEGATIVE
FLUBV RNA RESP QL NAA+PROBE: NEGATIVE
GLUCOSE SERPL-MCNC: 79 MG/DL (ref 70–99)
GLUCOSE UR STRIP-MCNC: NEGATIVE MG/DL
HCG SERPL QL: NEGATIVE
HCT VFR BLD AUTO: 35.8 % (ref 35–47)
HGB BLD-MCNC: 12 G/DL (ref 11.7–15.7)
HGB UR QL STRIP: NEGATIVE
IMM GRANULOCYTES # BLD: 0 10E3/UL
IMM GRANULOCYTES NFR BLD: 0 %
INTERPRETATION ECG - MUSE: NORMAL
KETONES UR STRIP-MCNC: NEGATIVE MG/DL
LACTATE SERPL-SCNC: 0.7 MMOL/L (ref 0.7–2)
LEUKOCYTE ESTERASE UR QL STRIP: NEGATIVE
LYMPHOCYTES # BLD AUTO: 2.2 10E3/UL (ref 0.8–5.3)
LYMPHOCYTES NFR BLD AUTO: 22 %
MAGNESIUM SERPL-MCNC: 2.3 MG/DL (ref 1.7–2.3)
MCH RBC QN AUTO: 27.4 PG (ref 26.5–33)
MCHC RBC AUTO-ENTMCNC: 33.5 G/DL (ref 31.5–36.5)
MCV RBC AUTO: 82 FL (ref 78–100)
MONOCYTES # BLD AUTO: 0.5 10E3/UL (ref 0–1.3)
MONOCYTES NFR BLD AUTO: 5 %
MUCOUS THREADS #/AREA URNS LPF: PRESENT /LPF
NEUTROPHILS # BLD AUTO: 6.9 10E3/UL (ref 1.6–8.3)
NEUTROPHILS NFR BLD AUTO: 71 %
NITRATE UR QL: NEGATIVE
NRBC # BLD AUTO: 0 10E3/UL
NRBC BLD AUTO-RTO: 0 /100
P AXIS - MUSE: 48 DEGREES
PH UR STRIP: 5.5 [PH] (ref 5–7)
PLATELET # BLD AUTO: 250 10E3/UL (ref 150–450)
POTASSIUM SERPL-SCNC: 4 MMOL/L (ref 3.4–5.3)
PR INTERVAL - MUSE: 180 MS
PROT SERPL-MCNC: 7.2 G/DL (ref 6.4–8.3)
QRS DURATION - MUSE: 86 MS
QT - MUSE: 378 MS
QTC - MUSE: 444 MS
R AXIS - MUSE: 22 DEGREES
RBC # BLD AUTO: 4.38 10E6/UL (ref 3.8–5.2)
RBC URINE: 0 /HPF
RSV RNA SPEC NAA+PROBE: NEGATIVE
SARS-COV-2 RNA RESP QL NAA+PROBE: NEGATIVE
SODIUM SERPL-SCNC: 137 MMOL/L (ref 135–145)
SP GR UR STRIP: 1.01 (ref 1–1.03)
SQUAMOUS EPITHELIAL: 2 /HPF
SYSTOLIC BLOOD PRESSURE - MUSE: NORMAL MMHG
T AXIS - MUSE: 1 DEGREES
TROPONIN T SERPL HS-MCNC: <6 NG/L
UROBILINOGEN UR STRIP-MCNC: NORMAL MG/DL
VENTRICULAR RATE- MUSE: 83 BPM
WBC # BLD AUTO: 9.8 10E3/UL (ref 4–11)
WBC URINE: <1 /HPF

## 2024-05-08 PROCEDURE — 96374 THER/PROPH/DIAG INJ IV PUSH: CPT | Performed by: STUDENT IN AN ORGANIZED HEALTH CARE EDUCATION/TRAINING PROGRAM

## 2024-05-08 PROCEDURE — 85025 COMPLETE CBC W/AUTO DIFF WBC: CPT | Performed by: PHYSICIAN ASSISTANT

## 2024-05-08 PROCEDURE — 36415 COLL VENOUS BLD VENIPUNCTURE: CPT | Performed by: PHYSICIAN ASSISTANT

## 2024-05-08 PROCEDURE — 99285 EMERGENCY DEPT VISIT HI MDM: CPT | Mod: 25 | Performed by: STUDENT IN AN ORGANIZED HEALTH CARE EDUCATION/TRAINING PROGRAM

## 2024-05-08 PROCEDURE — 87637 SARSCOV2&INF A&B&RSV AMP PRB: CPT | Performed by: PHYSICIAN ASSISTANT

## 2024-05-08 PROCEDURE — 250N000011 HC RX IP 250 OP 636: Performed by: PHYSICIAN ASSISTANT

## 2024-05-08 PROCEDURE — 99285 EMERGENCY DEPT VISIT HI MDM: CPT | Mod: FS | Performed by: STUDENT IN AN ORGANIZED HEALTH CARE EDUCATION/TRAINING PROGRAM

## 2024-05-08 PROCEDURE — 83605 ASSAY OF LACTIC ACID: CPT | Performed by: PHYSICIAN ASSISTANT

## 2024-05-08 PROCEDURE — 83735 ASSAY OF MAGNESIUM: CPT | Performed by: PHYSICIAN ASSISTANT

## 2024-05-08 PROCEDURE — 84484 ASSAY OF TROPONIN QUANT: CPT | Performed by: PHYSICIAN ASSISTANT

## 2024-05-08 PROCEDURE — 80053 COMPREHEN METABOLIC PANEL: CPT | Performed by: PHYSICIAN ASSISTANT

## 2024-05-08 PROCEDURE — 71046 X-RAY EXAM CHEST 2 VIEWS: CPT

## 2024-05-08 PROCEDURE — 84703 CHORIONIC GONADOTROPIN ASSAY: CPT | Performed by: PHYSICIAN ASSISTANT

## 2024-05-08 PROCEDURE — 96361 HYDRATE IV INFUSION ADD-ON: CPT | Performed by: STUDENT IN AN ORGANIZED HEALTH CARE EDUCATION/TRAINING PROGRAM

## 2024-05-08 PROCEDURE — 81003 URINALYSIS AUTO W/O SCOPE: CPT | Performed by: PHYSICIAN ASSISTANT

## 2024-05-08 PROCEDURE — 258N000003 HC RX IP 258 OP 636: Performed by: PHYSICIAN ASSISTANT

## 2024-05-08 RX ORDER — ONDANSETRON 2 MG/ML
4 INJECTION INTRAMUSCULAR; INTRAVENOUS ONCE
Status: COMPLETED | OUTPATIENT
Start: 2024-05-08 | End: 2024-05-08

## 2024-05-08 RX ADMIN — ONDANSETRON 4 MG: 2 INJECTION INTRAMUSCULAR; INTRAVENOUS at 17:00

## 2024-05-08 RX ADMIN — SODIUM CHLORIDE 1000 ML: 9 INJECTION, SOLUTION INTRAVENOUS at 17:00

## 2024-05-08 ASSESSMENT — COLUMBIA-SUICIDE SEVERITY RATING SCALE - C-SSRS
4. HAVE YOU HAD THESE THOUGHTS AND HAD SOME INTENTION OF ACTING ON THEM?: NO
2. HAVE YOU ACTUALLY HAD ANY THOUGHTS OF KILLING YOURSELF IN THE PAST MONTH?: YES
5. HAVE YOU STARTED TO WORK OUT OR WORKED OUT THE DETAILS OF HOW TO KILL YOURSELF? DO YOU INTEND TO CARRY OUT THIS PLAN?: NO
6. HAVE YOU EVER DONE ANYTHING, STARTED TO DO ANYTHING, OR PREPARED TO DO ANYTHING TO END YOUR LIFE?: YES
1. IN THE PAST MONTH, HAVE YOU WISHED YOU WERE DEAD OR WISHED YOU COULD GO TO SLEEP AND NOT WAKE UP?: YES
3. HAVE YOU BEEN THINKING ABOUT HOW YOU MIGHT KILL YOURSELF?: NO

## 2024-05-08 ASSESSMENT — ACTIVITIES OF DAILY LIVING (ADL)
ADLS_ACUITY_SCORE: 39

## 2024-05-08 NOTE — ED TRIAGE NOTES
BIBA from  for multiple reported syncopal episodes at clinic today. SBP in 80s in rig.     VSS in ED. Pt endorsing N/V with body chills along with x4 syncopal episodes at home. Pt reports symptom onset around 1400 today.      Triage Assessment (Adult)       Row Name 05/08/24 1554          Triage Assessment    Airway WDL WDL        Respiratory WDL    Respiratory WDL WDL        Skin Circulation/Temperature WDL    Skin Circulation/Temperature WDL WDL        Cardiac WDL    Cardiac WDL WDL        Peripheral/Neurovascular WDL    Peripheral Neurovascular WDL WDL        Cognitive/Neuro/Behavioral WDL    Cognitive/Neuro/Behavioral WDL WDL

## 2024-05-08 NOTE — ED PROVIDER NOTES
ED Provider Note  [facility]      History     Chief Complaint   Patient presents with    Syncope     BIBA from  for multiple reported syncopal episodes at clinic today. SBP in 80s in rig.      HPI  Sadaf Ross is a 23 year old female with complex past medical history including history of intellectual disability, borderline personality disorder, history of suicidal ideation, morbid obesity,, chronic suicidality, frequent emergency department visits who presents the emergency department today with concerns for syncope and hypotension.    Patient presents via EMS.  Per EMS patient was found to be hypotensive in the field with systolic pressures in the 80s prior to arrival.  She was given approximately 200 cc normal saline prior to arrival.    Patient states that main reason for coming in today is with multiple episodes of syncope.  She states that she was going to her primary care appointment today and syncopized 4 times in the waiting room.  She notes she did not fall or hit her head, she feels that she may be was out for about 4 to 5 minutes with each episode.  She states she also has generalized bodyaches with her symptoms which are new for her.  No measured fevers no new cough or runny nose from baseline.  She deals with chronic chest pain which per patient is unchanged she is not having any dyspnea.  She states she has continued to have some vomiting after she tries to eat or drink anything without any abdominal pain states she did start to experience some diarrhea with per patient report black stool.  She states she is unsure if she is on iron at baseline.  Per EMR she has no history of upper GI bleed in the past.  Patient notes she has been doing with several days of urinary frequency without hematuria or dysuria.  She notes no vaginal bleeding or concern for pregnancy.  She is not anticoagulated.    Past Medical History  Past Medical History:   Diagnosis Date    ADHD  (attention deficit hyperactivity disorder)     Bipolar 1 disorder (H)     Borderline personality disorder (H)     Depression     Depressive disorder     Intellectual disability     Obesity     Syncope      Past Surgical History:   Procedure Laterality Date    APPENDECTOMY      APPENDECTOMY       albuterol (PROAIR HFA/PROVENTIL HFA/VENTOLIN HFA) 108 (90 Base) MCG/ACT inhaler  bisacodyl (DULCOLAX) 5 MG EC tablet  cyclobenzaprine (FLEXERIL) 10 MG tablet  FLUoxetine (PROZAC) 40 MG capsule  hydrocortisone, Perianal, (HYDROCORTISONE) 2.5 % cream  loperamide (IMODIUM A-D) 2 MG tablet  nitroFURantoin macrocrystal-monohydrate (MACROBID) 100 MG capsule  OLANZapine zydis (ZYPREXA) 5 MG ODT  ondansetron (ZOFRAN) 4 MG tablet  polyethylene glycol (MIRALAX) 17 GM/Dose powder  acetaminophen (TYLENOL) 325 MG tablet  ARIPiprazole lauroxil ER (ARISTADA) 882 MG/3.2ML intra-muscular  calcium carbonate (TUMS) 500 MG chewable tablet  dicyclomine (BENTYL) 20 MG tablet  docusate sodium (COLACE) 50 MG capsule  famotidine (PEPCID) 20 MG tablet  hydrOXYzine (ATARAX) 50 MG tablet  ibuprofen (ADVIL/MOTRIN) 200 MG tablet  LORazepam (ATIVAN) 0.5 MG tablet  mupirocin (BACTROBAN) 2 % external ointment  ondansetron (ZOFRAN) 4 MG tablet  pantoprazole (PROTONIX) 40 MG EC tablet  promethazine (PHENERGAN) 12.5 MG tablet  sennosides (SENOKOT) 8.6 MG tablet  traZODone (DESYREL) 50 MG tablet  Vitamin D, Cholecalciferol, 25 MCG (1000 UT) TABS      Allergies   Allergen Reactions    Penicillin G     Penicillins Rash and Unknown     Family History  Family History   Problem Relation Age of Onset    Diabetes Type 1 Father     Cancer Paternal Grandfather      Social History   Social History     Tobacco Use    Smoking status: Some Days     Current packs/day: 0.25     Average packs/day: 0.3 packs/day for 5.0 years (1.3 ttl pk-yrs)     Types: Cigarettes, Vaping Device    Smokeless tobacco: Former   Substance Use Topics    Alcohol use: No    Drug use: No         A  medically appropriate review of systems was performed with pertinent positives and negatives noted in the HPI, and all other systems negative.    Physical Exam   BP: 134/76  Pulse: 84  Temp: 97.9  F (36.6  C)  Resp: 16  SpO2: 100 %  Lying Orthostatic BP: 84/35  Lying Orthostatic Pulse: 74 bpm  Sitting Orthostatic BP: 99/62  Sitting Orthostatic Pulse: 77 bpm  Standing Orthostatic BP:  (pt. felt too dizzy to stand)  Standing Orthostatic Pulse:  (pt felt too dizzy to stand)  Physical Exam      GENERAL APPEARANCE: The patient is well developed, well appearing, and in no acute distress.  HEAD:  Normocephalic and atraumatic.   EENT: Voice normal.  NECK: Trachea is midline.No lymphadenopathy or tenderness.  LUNGS: Breath sounds are equal and clear bilaterally. No wheezes, rhonchi, or rales.  HEART: Regular rate and normal rhythm.  Radial pulses 2+ bilaterally.  ABDOMEN: Soft, flat, and benign. No mass, tenderness, guarding, or rebound.Bowel sounds are present.  EXTREMITIES: No cyanosis, clubbing, or edema.  NEUROLOGIC: No focal sensory or motor deficits are noted.  Cranial nerves II through XII grossly intact.  Rapid altering movements and finger-to-nose testing intact.   strength is 5 out of 5 bilaterally.  Resisted plantarflexion dorsiflexion is 5 out of 5 bilaterally.  PSYCHIATRIC: The patient is awake, alert.  Appropriate mood and affect.  SKIN: Warm, dry, and well perfused. Good turgor.      ED Course, Procedures, & Data        EKG 5/8/2024    Normal sinus rhythm, ventricular rate 83 QTc 444.  When interpreted by myself, the rhythm is regular.  There is a P wave before every QRS complex.  No visible ST elevation or depression to suggest ischemia.  EKG was compared to prior from 12/19/2023 and appears similar     Results for orders placed or performed during the hospital encounter of 05/08/24   Chest XR,  PA & LAT     Status: None    Narrative    EXAM: XR CHEST 2 VIEWS  LOCATION: [facility]  DATE: 5/8/2024    INDICATION: Syncope  COMPARISON: April 7, 2024      Impression    IMPRESSION: Negative chest.   Comprehensive metabolic panel     Status: Normal   Result Value Ref Range    Sodium 137 135 - 145 mmol/L    Potassium 4.0 3.4 - 5.3 mmol/L    Carbon Dioxide (CO2) 25 22 - 29 mmol/L    Anion Gap 9 7 - 15 mmol/L    Urea Nitrogen 10.5 6.0 - 20.0 mg/dL    Creatinine 0.88 0.51 - 0.95 mg/dL    GFR Estimate >90 >60 mL/min/1.73m2    Calcium 8.9 8.6 - 10.0 mg/dL    Chloride 103 98 - 107 mmol/L    Glucose 79 70 - 99 mg/dL    Alkaline Phosphatase 73 40 - 150 U/L    AST 17 0 - 45 U/L    ALT 16 0 - 50 U/L    Protein Total 7.2 6.4 - 8.3 g/dL    Albumin 4.6 3.5 - 5.2 g/dL    Bilirubin Total 0.2 <=1.2 mg/dL   UA with Microscopic reflex to Culture     Status: Abnormal    Specimen: Urine, NOS   Result Value Ref Range    Color Urine Straw Colorless, Straw, Light Yellow, Yellow    Appearance Urine Clear Clear    Glucose Urine Negative Negative mg/dL    Bilirubin Urine Negative Negative    Ketones Urine Negative Negative mg/dL    Specific Gravity Urine 1.007 1.003 - 1.035    Blood Urine Negative Negative    pH Urine 5.5 5.0 - 7.0    Protein Albumin Urine Negative Negative mg/dL    Urobilinogen Urine Normal Normal, 2.0 mg/dL    Nitrite Urine Negative Negative    Leukocyte Esterase Urine Negative Negative    Bacteria Urine Few (A) None Seen /HPF    Mucus Urine Present (A) None Seen /LPF    RBC Urine 0 <=2 /HPF    WBC Urine <1 <=5 /HPF    Squamous Epithelials Urine 2 (H) <=1 /HPF    Narrative    Urine Culture not indicated   Symptomatic Influenza A/B, RSV, & SARS-CoV2 PCR (COVID-19) Nose     Status: Normal    Specimen: Nose; Swab   Result Value Ref Range    Influenza A PCR Negative Negative    Influenza B PCR Negative Negative    RSV PCR Negative Negative    SARS CoV2 PCR Negative Negative    Narrative    Testing was performed using the Xpert Xpress CoV2/Flu/RSV Assay on the bTendo  Instrument. This test should be ordered for the detection of SARS-CoV-2, influenza, and RSV viruses in individuals who meet clinical and/or epidemiological criteria. Test performance is unknown in asymptomatic patients. This test is for in vitro diagnostic use under the FDA EUA for laboratories certified under CLIA to perform high or moderate complexity testing. This test has not been FDA cleared or approved. A negative result does not rule out the presence of PCR inhibitors in the specimen or target RNA in concentration below the limit of detection for the assay. If only one viral target is positive but coinfection with multiple targets is suspected, the sample should be re-tested with another FDA cleared, approved, or authorized test, if coinfection would change clinical management. This test was validated by the [facility]. These laboratories are certified under the Clinical Laboratory Improvement Amendments of 1988 (CLIA-88) as qualified to perform high complexity laboratory testing.   HCG qualitative pregnancy (blood)     Status: Normal   Result Value Ref Range    hCG Serum Qualitative Negative Negative   Troponin T, High Sensitivity     Status: Normal   Result Value Ref Range    Troponin T, High Sensitivity <6 <=14 ng/L   Lactic acid whole blood     Status: Normal   Result Value Ref Range    Lactic Acid 0.7 0.7 - 2.0 mmol/L   Magnesium     Status: Normal   Result Value Ref Range    Magnesium 2.3 1.7 - 2.3 mg/dL   CBC with platelets and differential     Status: None   Result Value Ref Range    WBC Count 9.8 4.0 - 11.0 10e3/uL    RBC Count 4.38 3.80 - 5.20 10e6/uL    Hemoglobin 12.0 11.7 - 15.7 g/dL    Hematocrit 35.8 35.0 - 47.0 %    MCV 82 78 - 100 fL    MCH 27.4 26.5 - 33.0 pg    MCHC 33.5 31.5 - 36.5 g/dL    RDW 13.3 10.0 - 15.0 %    Platelet Count 250 150 - 450 10e3/uL    % Neutrophils 71 %    % Lymphocytes 22 %    % Monocytes 5 %    % Eosinophils 1 %    % Basophils 1 %    % Immature  Granulocytes 0 %    NRBCs per 100 WBC 0 <1 /100    Absolute Neutrophils 6.9 1.6 - 8.3 10e3/uL    Absolute Lymphocytes 2.2 0.8 - 5.3 10e3/uL    Absolute Monocytes 0.5 0.0 - 1.3 10e3/uL    Absolute Eosinophils 0.1 0.0 - 0.7 10e3/uL    Absolute Basophils 0.1 0.0 - 0.2 10e3/uL    Absolute Immature Granulocytes 0.0 <=0.4 10e3/uL    Absolute NRBCs 0.0 10e3/uL   EKG 12 lead     Status: None (Preliminary result)   Result Value Ref Range    Systolic Blood Pressure  mmHg    Diastolic Blood Pressure  mmHg    Ventricular Rate 83 BPM    Atrial Rate 83 BPM    MI Interval 180 ms    QRS Duration 86 ms     ms    QTc 444 ms    P Axis 48 degrees    R AXIS 22 degrees    T Axis 1 degrees    Interpretation ECG       Sinus rhythm  Possible Anterior infarct , age undetermined  Abnormal ECG     CBC with platelets differential     Status: None    Narrative    The following orders were created for panel order CBC with platelets differential.  Procedure                               Abnormality         Status                     ---------                               -----------         ------                     CBC with platelets and d...[[id number]]                      Final result                 Please view results for these tests on the individual orders.     Medications   ondansetron (ZOFRAN) injection 4 mg (4 mg Intravenous $Given 5/8/24 1700)   sodium chloride 0.9% BOLUS 1,000 mL (0 mLs Intravenous Stopped 5/8/24 1819)     Labs Ordered and Resulted from Time of ED Arrival to Time of ED Departure   ROUTINE UA WITH MICROSCOPIC REFLEX TO CULTURE - Abnormal       Result Value    Color Urine Straw      Appearance Urine Clear      Glucose Urine Negative      Bilirubin Urine Negative      Ketones Urine Negative      Specific Gravity Urine 1.007      Blood Urine Negative      pH Urine 5.5      Protein Albumin Urine Negative      Urobilinogen Urine Normal      Nitrite Urine Negative      Leukocyte Esterase Urine Negative      Bacteria  Urine Few (*)     Mucus Urine Present (*)     RBC Urine 0      WBC Urine <1      Squamous Epithelials Urine 2 (*)    COMPREHENSIVE METABOLIC PANEL - Normal    Sodium 137      Potassium 4.0      Carbon Dioxide (CO2) 25      Anion Gap 9      Urea Nitrogen 10.5      Creatinine 0.88      GFR Estimate >90      Calcium 8.9      Chloride 103      Glucose 79      Alkaline Phosphatase 73      AST 17      ALT 16      Protein Total 7.2      Albumin 4.6      Bilirubin Total 0.2     INFLUENZA A/B, RSV, & SARS-COV2 PCR - Normal    Influenza A PCR Negative      Influenza B PCR Negative      RSV PCR Negative      SARS CoV2 PCR Negative     HCG QUALITATIVE PREGNANCY - Normal    hCG Serum Qualitative Negative     TROPONIN T, HIGH SENSITIVITY - Normal    Troponin T, High Sensitivity <6     LACTIC ACID WHOLE BLOOD - Normal    Lactic Acid 0.7     MAGNESIUM - Normal    Magnesium 2.3     CBC WITH PLATELETS AND DIFFERENTIAL    WBC Count 9.8      RBC Count 4.38      Hemoglobin 12.0      Hematocrit 35.8      MCV 82      MCH 27.4      MCHC 33.5      RDW 13.3      Platelet Count 250      % Neutrophils 71      % Lymphocytes 22      % Monocytes 5      % Eosinophils 1      % Basophils 1      % Immature Granulocytes 0      NRBCs per 100 WBC 0      Absolute Neutrophils 6.9      Absolute Lymphocytes 2.2      Absolute Monocytes 0.5      Absolute Eosinophils 0.1      Absolute Basophils 0.1      Absolute Immature Granulocytes 0.0      Absolute NRBCs 0.0       Chest XR,  PA & LAT   Final Result   IMPRESSION: Negative chest.             Critical care was not performed.     Medical Decision Making  The patient's presentation was of moderate complexity (an undiagnosed new problem with uncertain diagnosis).    The patient's evaluation involved:  ordering and/or review of 3+ test(s) in this encounter (see separate area of note for details)    The patient's management necessitated moderate risk (prescription drug management including medications given in the  ED).    Assessment & Plan    This is a 23-year-old female presenting with concerns for multiple syncopal episodes along with report of hypotension in the setting of prior to arrival.  On presentation to the department initial blood pressure reassuring 134/76.  Patient not hypoxic or tachycardic.  On exam she has no focal neurologic deficits.  She has a soft abdomen and clear lungs.  She has she does report some bodyaches which are new for her along with some nausea.  With report of pressures being low in the field and possible infectious symptoms will initiate broad infectious workup including lactic acid screening labs UA chest x-ray troponin EKG.  Also consider possibility of arrhythmia, anemia, electrolyte disturbance, seizure disorder, ACS, cardiogenic syncope on the differential.  Patient given 1 L normal saline in the ED while awaiting test results.    Workup in the ED reassuring.  EKG without findings for ischemia or other arrhythmias.  Initial lactic acid within reference range.  CBC without leukocytosis or anemia with normal chemistry including electrolytes creatinine and LFTs.  Pregnancy negative.  Troponin negative.  UA without findings to suggest UTI.  Chest x-ray my interpretation shows no obvious consolidation radiologist overread negative.  Respiratory swab negative.  After fluids patient was able to tolerate oral liquids.  She had been able to get up and down in the exam room we did obtain orthostatic vitals and she is not orthostatic.  At this time she does appear to be back to her baseline.  She will be discharged back to her group home.  I did place a referral to follow-up with primary care.  She was made aware of return precautions.  Patient has no other questions or concerns at this time.  Red flag signs were addressed, and they were in agreement with the patient care plan provided.    Patient seen and discussed with attending physician , who agrees with my plan of care.    I have  reviewed the nursing notes. I have reviewed the findings, diagnosis, plan and need for follow up with the patient.    Discharge Medication List as of 5/8/2024  7:37 PM          Final diagnoses:   Syncope, unspecified syncope type       LEANNA Carey  [facility] EMERGENCY DEPARTMENT  5/8/2024    --    ED Attending Physician Attestation    I Day Fang MD, cared for this patient with the Advanced Practice Provider (BETHANY). I have performed a history and physical examination of the patient independent of the BETHANY. I reviewed the BETHANY's documentation above and agree with the documented findings and plan of care. I personally provided a substantive portion of the care for this patient, including the complete Medical Decision Making. Please see the BETHANY's documentation for full details.  Medical Decision Making  The patient's presentation was of moderate complexity (an undiagnosed new problem with uncertain diagnosis).    The patient's evaluation involved:  review of external note(s) from 3+ sources (see separate area of note for details)  review of 3+ test result(s) ordered prior to this encounter (see separate area of note for details)  ordering and/or review of 3+ test(s) in this encounter (see separate area of note for details)    The patient's management necessitated high risk (a decision regarding hospitalization).          Day Fang MD  Emergency Medicine        Day Fang MD  05/09/24 0013

## 2024-05-08 NOTE — ED NOTES
Bed: ED09  Expected date:   Expected time:   Means of arrival:   Comments:  To Triage: N714 ETA 15 mins  23 F dizziness, green

## 2024-05-09 ENCOUNTER — APPOINTMENT (OUTPATIENT)
Dept: CT IMAGING | Facility: CLINIC | Age: 24
End: 2024-05-09
Attending: PHYSICIAN ASSISTANT
Payer: MEDICARE

## 2024-05-09 ENCOUNTER — HOSPITAL ENCOUNTER (EMERGENCY)
Facility: CLINIC | Age: 24
Discharge: HOME | End: 2024-05-09
Attending: STUDENT IN AN ORGANIZED HEALTH CARE EDUCATION/TRAINING PROGRAM
Payer: MEDICARE

## 2024-05-09 ENCOUNTER — APPOINTMENT (OUTPATIENT)
Dept: ULTRASOUND IMAGING | Facility: CLINIC | Age: 24
End: 2024-05-09
Attending: PHYSICIAN ASSISTANT
Payer: MEDICARE

## 2024-05-09 VITALS
RESPIRATION RATE: 18 BRPM | OXYGEN SATURATION: 100 % | TEMPERATURE: 98.5 F | SYSTOLIC BLOOD PRESSURE: 140 MMHG | HEART RATE: 97 BPM | DIASTOLIC BLOOD PRESSURE: 85 MMHG

## 2024-05-09 VITALS
HEART RATE: 80 BPM | TEMPERATURE: 98.8 F | DIASTOLIC BLOOD PRESSURE: 90 MMHG | OXYGEN SATURATION: 100 % | SYSTOLIC BLOOD PRESSURE: 118 MMHG | RESPIRATION RATE: 18 BRPM

## 2024-05-09 DIAGNOSIS — R55 SYNCOPE, UNSPECIFIED SYNCOPE TYPE: ICD-10-CM

## 2024-05-09 DIAGNOSIS — R10.10 UPPER ABDOMINAL PAIN: ICD-10-CM

## 2024-05-09 LAB
ALBUMIN SERPL BCG-MCNC: 4.3 G/DL (ref 3.5–5.2)
ALP SERPL-CCNC: 64 U/L (ref 40–150)
ALT SERPL W P-5'-P-CCNC: 13 U/L (ref 0–50)
ANION GAP SERPL CALCULATED.3IONS-SCNC: 11 MMOL/L (ref 7–15)
AST SERPL W P-5'-P-CCNC: 16 U/L (ref 0–45)
ATRIAL RATE - MUSE: 77 BPM
BASOPHILS # BLD AUTO: 0 10E3/UL (ref 0–0.2)
BASOPHILS NFR BLD AUTO: 0 %
BILIRUB SERPL-MCNC: <0.2 MG/DL
BUN SERPL-MCNC: 10.7 MG/DL (ref 6–20)
CALCIUM SERPL-MCNC: 8.2 MG/DL (ref 8.6–10)
CHLORIDE SERPL-SCNC: 110 MMOL/L (ref 98–107)
CREAT SERPL-MCNC: 0.82 MG/DL (ref 0.51–0.95)
DEPRECATED HCO3 PLAS-SCNC: 22 MMOL/L (ref 22–29)
DIASTOLIC BLOOD PRESSURE - MUSE: NORMAL MMHG
EGFRCR SERPLBLD CKD-EPI 2021: >90 ML/MIN/1.73M2
EOSINOPHIL # BLD AUTO: 0.2 10E3/UL (ref 0–0.7)
EOSINOPHIL NFR BLD AUTO: 2 %
ERYTHROCYTE [DISTWIDTH] IN BLOOD BY AUTOMATED COUNT: 13.4 % (ref 10–15)
GLUCOSE SERPL-MCNC: 78 MG/DL (ref 70–99)
HCT VFR BLD AUTO: 32.9 % (ref 35–47)
HGB BLD-MCNC: 10.8 G/DL (ref 11.7–15.7)
IMM GRANULOCYTES # BLD: 0 10E3/UL
IMM GRANULOCYTES NFR BLD: 0 %
INTERPRETATION ECG - MUSE: NORMAL
LIPASE SERPL-CCNC: 41 U/L (ref 13–60)
LYMPHOCYTES # BLD AUTO: 3.1 10E3/UL (ref 0.8–5.3)
LYMPHOCYTES NFR BLD AUTO: 33 %
MCH RBC QN AUTO: 27.1 PG (ref 26.5–33)
MCHC RBC AUTO-ENTMCNC: 32.8 G/DL (ref 31.5–36.5)
MCV RBC AUTO: 83 FL (ref 78–100)
MONOCYTES # BLD AUTO: 0.5 10E3/UL (ref 0–1.3)
MONOCYTES NFR BLD AUTO: 6 %
NEUTROPHILS # BLD AUTO: 5.4 10E3/UL (ref 1.6–8.3)
NEUTROPHILS NFR BLD AUTO: 59 %
NRBC # BLD AUTO: 0 10E3/UL
NRBC BLD AUTO-RTO: 0 /100
P AXIS - MUSE: 45 DEGREES
PLATELET # BLD AUTO: 228 10E3/UL (ref 150–450)
POTASSIUM SERPL-SCNC: 3.8 MMOL/L (ref 3.4–5.3)
PR INTERVAL - MUSE: 170 MS
PROT SERPL-MCNC: 6.7 G/DL (ref 6.4–8.3)
QRS DURATION - MUSE: 86 MS
QT - MUSE: 376 MS
QTC - MUSE: 425 MS
R AXIS - MUSE: 28 DEGREES
RBC # BLD AUTO: 3.99 10E6/UL (ref 3.8–5.2)
SODIUM SERPL-SCNC: 143 MMOL/L (ref 135–145)
SYSTOLIC BLOOD PRESSURE - MUSE: NORMAL MMHG
T AXIS - MUSE: 12 DEGREES
VENTRICULAR RATE- MUSE: 77 BPM
WBC # BLD AUTO: 9.2 10E3/UL (ref 4–11)

## 2024-05-09 PROCEDURE — 80053 COMPREHEN METABOLIC PANEL: CPT | Performed by: PHYSICIAN ASSISTANT

## 2024-05-09 PROCEDURE — 85025 COMPLETE CBC W/AUTO DIFF WBC: CPT | Performed by: PHYSICIAN ASSISTANT

## 2024-05-09 PROCEDURE — 36415 COLL VENOUS BLD VENIPUNCTURE: CPT | Performed by: PHYSICIAN ASSISTANT

## 2024-05-09 PROCEDURE — 93010 ELECTROCARDIOGRAM REPORT: CPT | Performed by: PHYSICIAN ASSISTANT

## 2024-05-09 PROCEDURE — 250N000011 HC RX IP 250 OP 636: Performed by: STUDENT IN AN ORGANIZED HEALTH CARE EDUCATION/TRAINING PROGRAM

## 2024-05-09 PROCEDURE — 99283 EMERGENCY DEPT VISIT LOW MDM: CPT | Mod: 27 | Performed by: PHYSICIAN ASSISTANT

## 2024-05-09 PROCEDURE — G1010 CDSM STANSON: HCPCS

## 2024-05-09 PROCEDURE — 99284 EMERGENCY DEPT VISIT MOD MDM: CPT | Mod: FS | Performed by: STUDENT IN AN ORGANIZED HEALTH CARE EDUCATION/TRAINING PROGRAM

## 2024-05-09 PROCEDURE — 93005 ELECTROCARDIOGRAM TRACING: CPT | Performed by: PHYSICIAN ASSISTANT

## 2024-05-09 PROCEDURE — 83690 ASSAY OF LIPASE: CPT | Performed by: PHYSICIAN ASSISTANT

## 2024-05-09 PROCEDURE — 76705 ECHO EXAM OF ABDOMEN: CPT

## 2024-05-09 PROCEDURE — 99285 EMERGENCY DEPT VISIT HI MDM: CPT | Mod: 25 | Performed by: STUDENT IN AN ORGANIZED HEALTH CARE EDUCATION/TRAINING PROGRAM

## 2024-05-09 PROCEDURE — 74177 CT ABD & PELVIS W/CONTRAST: CPT | Mod: MG

## 2024-05-09 PROCEDURE — 250N000009 HC RX 250: Performed by: STUDENT IN AN ORGANIZED HEALTH CARE EDUCATION/TRAINING PROGRAM

## 2024-05-09 RX ORDER — IOPAMIDOL 755 MG/ML
100 INJECTION, SOLUTION INTRAVASCULAR ONCE
Status: COMPLETED | OUTPATIENT
Start: 2024-05-09 | End: 2024-05-09

## 2024-05-09 RX ADMIN — IOPAMIDOL 122 ML: 755 INJECTION, SOLUTION INTRAVENOUS at 21:20

## 2024-05-09 RX ADMIN — SODIUM CHLORIDE 62 ML: 9 INJECTION, SOLUTION INTRAVENOUS at 21:21

## 2024-05-09 ASSESSMENT — COLUMBIA-SUICIDE SEVERITY RATING SCALE - C-SSRS
2. HAVE YOU ACTUALLY HAD ANY THOUGHTS OF KILLING YOURSELF IN THE PAST MONTH?: YES
6. HAVE YOU EVER DONE ANYTHING, STARTED TO DO ANYTHING, OR PREPARED TO DO ANYTHING TO END YOUR LIFE?: NO
5. HAVE YOU STARTED TO WORK OUT OR WORKED OUT THE DETAILS OF HOW TO KILL YOURSELF? DO YOU INTEND TO CARRY OUT THIS PLAN?: NO
4. HAVE YOU HAD THESE THOUGHTS AND HAD SOME INTENTION OF ACTING ON THEM?: NO
6. HAVE YOU EVER DONE ANYTHING, STARTED TO DO ANYTHING, OR PREPARED TO DO ANYTHING TO END YOUR LIFE?: YES
2. HAVE YOU ACTUALLY HAD ANY THOUGHTS OF KILLING YOURSELF IN THE PAST MONTH?: NO
1. IN THE PAST MONTH, HAVE YOU WISHED YOU WERE DEAD OR WISHED YOU COULD GO TO SLEEP AND NOT WAKE UP?: YES
3. HAVE YOU BEEN THINKING ABOUT HOW YOU MIGHT KILL YOURSELF?: NO
1. IN THE PAST MONTH, HAVE YOU WISHED YOU WERE DEAD OR WISHED YOU COULD GO TO SLEEP AND NOT WAKE UP?: NO

## 2024-05-09 ASSESSMENT — ACTIVITIES OF DAILY LIVING (ADL)
ADLS_ACUITY_SCORE: 39

## 2024-05-09 NOTE — DISCHARGE INSTRUCTIONS
Here in the emergency room we discussed further evaluating her episodes of passing out with repeat blood work and imaging of your belly.  We discussed potential risk of missing a life-threatening or life impairing emergency without obtaining the imaging.  You feel better here and would like to go home.  I do recommend following up with your primary care provider.  I placed a referral yesterday for primary care follow-up and they should be calling you.    We discussed at any point if you develop any new or worsening symptoms would like to be reevaluated you may return back to the emergency room.

## 2024-05-09 NOTE — DISCHARGE INSTRUCTIONS
Here in the emergency room you have a reassuring evaluation.  Your basic lab work is reassuring.  You are given fluids and your blood pressure improved.  You do not have any findings on your exam suggestive of infection workup suggestive of infection.      Your blood pressure here was reassuring.  You should follow-up with your primary care provider.  Referral was placed and they should be calling you.    If you develop any new or worsening symptoms, is important to return right away to the emergency department for further evaluation and management.

## 2024-05-09 NOTE — ED PROVIDER NOTES
ED Provider Note  [facility]      History     Chief Complaint   Patient presents with    Dizziness    Nausea, Vomiting, & Diarrhea     HPI  Sadaf Ross is a 23 year old female with complex psychiatric history, frequent emergency department visits who presents to the emergency department today with concerns for dizziness weakness fatigue back pain and leg weakness.    Patient was seen by myself yesterday with concerns for multiple episodes of syncope and hypotension.  During her ED course she remained normotensive.  She had reassuring laboratory workup reported some infectious symptoms did not have findings concerning for infection with negative COVID swab negative UA clear chest x-ray negative troponin reassuring EKG.  She was able to tolerate orals and was discharged.    Patient presents to the emergency department today via EMS with concerns for ongoing symptoms.  She mentions that since discharge last night she has had multiple episodes of syncope/near syncope reportedly passing out about 9 more times overnight including an episode where she was talking to her therapist and lost consciousness.  She notes that the therapist did not call 911, she went back to her group home and then called 911 to come back here to the ED.  She denies any associated fevers today reports ongoing chills similar to yesterday along with ongoing            Past Medical History  Past Medical History:   Diagnosis Date    ADHD (attention deficit hyperactivity disorder)     Bipolar 1 disorder (H)     Borderline personality disorder (H)     Depression     Depressive disorder     Intellectual disability     Obesity     Syncope      Past Surgical History:   Procedure Laterality Date    APPENDECTOMY      APPENDECTOMY       acetaminophen (TYLENOL) 325 MG tablet  albuterol (PROAIR HFA/PROVENTIL HFA/VENTOLIN HFA) 108 (90 Base) MCG/ACT inhaler  ARIPiprazole lauroxil ER (ARISTADA) 882 MG/3.2ML  intra-muscular  bisacodyl (DULCOLAX) 5 MG EC tablet  calcium carbonate (TUMS) 500 MG chewable tablet  cyclobenzaprine (FLEXERIL) 10 MG tablet  dicyclomine (BENTYL) 20 MG tablet  docusate sodium (COLACE) 50 MG capsule  famotidine (PEPCID) 20 MG tablet  FLUoxetine (PROZAC) 40 MG capsule  hydrocortisone, Perianal, (HYDROCORTISONE) 2.5 % cream  hydrOXYzine (ATARAX) 50 MG tablet  ibuprofen (ADVIL/MOTRIN) 200 MG tablet  loperamide (IMODIUM A-D) 2 MG tablet  LORazepam (ATIVAN) 0.5 MG tablet  mupirocin (BACTROBAN) 2 % external ointment  nitroFURantoin macrocrystal-monohydrate (MACROBID) 100 MG capsule  OLANZapine zydis (ZYPREXA) 5 MG ODT  ondansetron (ZOFRAN) 4 MG tablet  ondansetron (ZOFRAN) 4 MG tablet  pantoprazole (PROTONIX) 40 MG EC tablet  polyethylene glycol (MIRALAX) 17 GM/Dose powder  promethazine (PHENERGAN) 12.5 MG tablet  sennosides (SENOKOT) 8.6 MG tablet  traZODone (DESYREL) 50 MG tablet  Vitamin D, Cholecalciferol, 25 MCG (1000 UT) TABS      Allergies   Allergen Reactions    Penicillin G     Penicillins Rash and Unknown     Family History  Family History   Problem Relation Age of Onset    Diabetes Type 1 Father     Cancer Paternal Grandfather      Social History   Social History     Tobacco Use    Smoking status: Some Days     Current packs/day: 0.25     Average packs/day: 0.3 packs/day for 5.0 years (1.3 ttl pk-yrs)     Types: Cigarettes, Vaping Device    Smokeless tobacco: Former   Substance Use Topics    Alcohol use: No    Drug use: No         A medically appropriate review of systems was performed with pertinent positives and negatives noted in the HPI, and all other systems negative.    Physical Exam   BP: (!) 158/99  Pulse: 97  Temp: 98.5  F (36.9  C)  Resp: 18  SpO2: 100 %  Physical Exam  GENERAL APPEARANCE: The patient is well developed, well appearing, and in no acute distress.  HEAD:  Normocephalic and atraumatic.   EENT: Voice normal.  NECK: Trachea is midline.No lymphadenopathy or  tenderness.  LUNGS: Breath sounds are equal and clear bilaterally. No wheezes, rhonchi, or rales.  HEART: Regular rate and normal rhythm.    ABDOMEN: Soft, flat, and benign.   EXTREMITIES: No cyanosis, clubbing, or edema.  NEUROLOGIC: No focal sensory or motor deficits are noted.  PSYCHIATRIC: The patient is awake, alert.  Appropriate mood and affect.  SKIN: Warm, dry, and well perfused. Good turgor.    ED Course, Procedures, & Data        EKG 5/9/2024:  Sinus rhythm, ventricular rate 77 QTc 425.  When interpreted by myself the rhythm is regular.  There is a P wave before every QRS complex.  No visible ST elevation or depression to stress ischemia.  EKG was compared to prior from yesterday, 5/8/2024 and appears similar.       Results for orders placed or performed during the hospital encounter of 05/09/24   EKG 12 lead     Status: None (Preliminary result)   Result Value Ref Range    Systolic Blood Pressure  mmHg    Diastolic Blood Pressure  mmHg    Ventricular Rate 77 BPM    Atrial Rate 77 BPM    WY Interval 170 ms    QRS Duration 86 ms     ms    QTc 425 ms    P Axis 45 degrees    R AXIS 28 degrees    T Axis 12 degrees    Interpretation ECG       Sinus rhythm  Possible Anterior infarct , age undetermined  Abnormal ECG       Medications   sodium chloride 0.9% BOLUS 1,000 mL (has no administration in time range)     Labs Ordered and Resulted from Time of ED Arrival to Time of ED Departure - No data to display  No orders to display          Critical care was not performed.     Medical Decision Making  The patient's presentation was of moderate complexity (an undiagnosed new problem with uncertain diagnosis).    The patient's evaluation involved:  ordering and/or review of 3+ test(s) in this encounter (see separate area of note for details)    The patient's management necessitated moderate risk (limitations due to social determinants of health (see separate area of note for details)).    Assessment & Plan    This  is a 23-year-old female present with concerns for recurrent episodes of syncope in the setting of recent ED evaluation yesterday for the same.  On presentation here vital signs are within normal range.  Patient has reassuring physical exam with no obvious focal neurologic deficits on my exam.  She is not tachycardic and has clear breath sounds.  In discussing her current symptoms with Dr. Fang, she does mention some upper abdominal discomfort did not mention this for me.  On my abdominal exam she has no focal tenderness she does have some epigastric tenderness when evaluated by Dr. Fang.  Discussed with patient further evaluating as consider differential diagnosis of pancreatitis, bowel obstruction, gastritis, cholecystitis, cholelithiasis, amongst other acute intra-abdominal processes.  Patient has not had any recent abdominal imaging.  Considered repeat lab work as well as ultrasound of the right upper quadrant and CT of the abdomen pelvis with contrast to further evaluate patient's symptoms in addition to repeat CBC chemistry lipase.  In the process of obtaining labs I was notified by nursing staff that patient elected to decline any sort of workup and wished to go home.  I did evaluate her at bedside she states she feels normal now and back to her baseline.  We had a long discussion regards to recommendations of obtaining additional workup including imaging of the abdomen to exclude any acute process.  We discussed potential risks of bodily harm or death without obtaining above testing.  Patient wishes to go home.  She did fill out AMA paperwork.  She was made aware she could certainly return back to the ED if she has any new or worsening symptoms.  Patient has no other questions or concerns at this time.  Red flag signs were addressed, and they were in agreement with the patient care plan provided.    Patient seen and discussed with attending physician , who agrees with my plan of care.      I have  reviewed the nursing notes. I have reviewed the findings, diagnosis, plan and need for follow up with the patient.    Discharge Medication List as of 5/9/2024  4:53 PM          Final diagnoses:   Syncope, unspecified syncope type   Upper abdominal pain       LEANNA Carey  [facility] EMERGENCY DEPARTMENT  5/9/2024

## 2024-05-09 NOTE — ED TRIAGE NOTES
N/V/D for several days and states onset dizziness today weakness and fatigue all week worse today stating she can barely walk due leg weakness.  Reports 9/10 back pain as well.      Triage Assessment (Adult)       Row Name 05/09/24 1517          Triage Assessment    Airway WDL WDL        Respiratory WDL    Respiratory WDL WDL        Skin Circulation/Temperature WDL    Skin Circulation/Temperature WDL WDL        Cardiac WDL    Cardiac WDL WDL        Peripheral/Neurovascular WDL    Peripheral Neurovascular WDL WDL

## 2024-05-10 ENCOUNTER — NURSE TRIAGE (OUTPATIENT)
Dept: NURSING | Facility: CLINIC | Age: 24
End: 2024-05-10
Payer: MEDICARE

## 2024-05-10 ENCOUNTER — HOSPITAL ENCOUNTER (EMERGENCY)
Facility: CLINIC | Age: 24
Discharge: HOME | End: 2024-05-10
Admitting: EMERGENCY MEDICINE
Payer: MEDICARE

## 2024-05-10 ENCOUNTER — HOSPITAL ENCOUNTER (EMERGENCY)
Facility: CLINIC | Age: 24
Discharge: HOME | End: 2024-05-10
Attending: FAMILY MEDICINE | Admitting: FAMILY MEDICINE
Payer: MEDICARE

## 2024-05-10 VITALS
BODY MASS INDEX: 44.3 KG/M2 | WEIGHT: 250 LBS | HEIGHT: 63 IN | TEMPERATURE: 98.2 F | HEART RATE: 71 BPM | SYSTOLIC BLOOD PRESSURE: 138 MMHG | OXYGEN SATURATION: 100 % | RESPIRATION RATE: 20 BRPM | DIASTOLIC BLOOD PRESSURE: 60 MMHG

## 2024-05-10 VITALS
TEMPERATURE: 98.8 F | DIASTOLIC BLOOD PRESSURE: 87 MMHG | OXYGEN SATURATION: 100 % | SYSTOLIC BLOOD PRESSURE: 129 MMHG | RESPIRATION RATE: 18 BRPM | HEART RATE: 76 BPM

## 2024-05-10 DIAGNOSIS — F41.9 ANXIETY: ICD-10-CM

## 2024-05-10 DIAGNOSIS — R11.0 NAUSEA: ICD-10-CM

## 2024-05-10 PROCEDURE — 99281 EMR DPT VST MAYX REQ PHY/QHP: CPT

## 2024-05-10 PROCEDURE — 99283 EMERGENCY DEPT VISIT LOW MDM: CPT | Performed by: FAMILY MEDICINE

## 2024-05-10 ASSESSMENT — COLUMBIA-SUICIDE SEVERITY RATING SCALE - C-SSRS
6. HAVE YOU EVER DONE ANYTHING, STARTED TO DO ANYTHING, OR PREPARED TO DO ANYTHING TO END YOUR LIFE?: NO
1. IN THE PAST MONTH, HAVE YOU WISHED YOU WERE DEAD OR WISHED YOU COULD GO TO SLEEP AND NOT WAKE UP?: NO
2. HAVE YOU ACTUALLY HAD ANY THOUGHTS OF KILLING YOURSELF IN THE PAST MONTH?: NO
2. HAVE YOU ACTUALLY HAD ANY THOUGHTS OF KILLING YOURSELF IN THE PAST MONTH?: NO
1. IN THE PAST MONTH, HAVE YOU WISHED YOU WERE DEAD OR WISHED YOU COULD GO TO SLEEP AND NOT WAKE UP?: NO
6. HAVE YOU EVER DONE ANYTHING, STARTED TO DO ANYTHING, OR PREPARED TO DO ANYTHING TO END YOUR LIFE?: NO

## 2024-05-10 NOTE — TELEPHONE ENCOUNTER
Pt is phoning stating that she feels like she is going to pass out     Pt states that she feels dizzy     Pt states that she has seizures and that she is having an increase in seizures     Pt states that she is not having a seizure at this time     Pt states that she is having severe difficulty breathing where she is struggling for every single breath     Per disposition: Call  Now     Pt instructed to call 911 Now and states that she will do that.     Care advice given per protocol and when to call back. Pt verbalized understanding and agrees to plan of care.    Diandra Eric RN  [facility] Nurse Advisor  12:17 PM 5/10/2024          Reason for Disposition   SEVERE difficulty breathing (e.g., struggling for each breath, speaks in single words)    Protocols used: Dizziness-A-OH

## 2024-05-10 NOTE — ED PROVIDER NOTES
ED Provider Note  [facility]      History     Chief Complaint   Patient presents with    Syncope    Abdominal Pain     HPI  Sadaf Ross is a 23 year old female with complex psychiatric history and reason ED evaluation yesterday and earlier today for syncope who presents the emergency department with concerns for ongoing syncope.  Please see earlier encounter at 3:14 PM for initial presentation.    In the interim since patient left AMA she states that she thinks she may have passed out 9 more times while being transferred back home.  She states that she decided she would like to come back as she was worried about a potential life-threatening emergency.  No other changes in the interim.    Past Medical History  Past Medical History:   Diagnosis Date    ADHD (attention deficit hyperactivity disorder)     Bipolar 1 disorder (H)     Borderline personality disorder (H)     Depression     Depressive disorder     Intellectual disability     Obesity     Syncope      Past Surgical History:   Procedure Laterality Date    APPENDECTOMY      APPENDECTOMY       acetaminophen (TYLENOL) 325 MG tablet  albuterol (PROAIR HFA/PROVENTIL HFA/VENTOLIN HFA) 108 (90 Base) MCG/ACT inhaler  ARIPiprazole lauroxil ER (ARISTADA) 882 MG/3.2ML intra-muscular  bisacodyl (DULCOLAX) 5 MG EC tablet  calcium carbonate (TUMS) 500 MG chewable tablet  cyclobenzaprine (FLEXERIL) 10 MG tablet  dicyclomine (BENTYL) 20 MG tablet  docusate sodium (COLACE) 50 MG capsule  famotidine (PEPCID) 20 MG tablet  FLUoxetine (PROZAC) 40 MG capsule  hydrocortisone, Perianal, (HYDROCORTISONE) 2.5 % cream  hydrOXYzine (ATARAX) 50 MG tablet  ibuprofen (ADVIL/MOTRIN) 200 MG tablet  loperamide (IMODIUM A-D) 2 MG tablet  LORazepam (ATIVAN) 0.5 MG tablet  mupirocin (BACTROBAN) 2 % external ointment  nitroFURantoin macrocrystal-monohydrate (MACROBID) 100 MG capsule  OLANZapine zydis (ZYPREXA) 5 MG ODT  ondansetron (ZOFRAN) 4 MG  tablet  ondansetron (ZOFRAN) 4 MG tablet  pantoprazole (PROTONIX) 40 MG EC tablet  polyethylene glycol (MIRALAX) 17 GM/Dose powder  promethazine (PHENERGAN) 12.5 MG tablet  sennosides (SENOKOT) 8.6 MG tablet  traZODone (DESYREL) 50 MG tablet  Vitamin D, Cholecalciferol, 25 MCG (1000 UT) TABS      Allergies   Allergen Reactions    Penicillin G     Penicillins Rash and Unknown     Family History  Family History   Problem Relation Age of Onset    Diabetes Type 1 Father     Cancer Paternal Grandfather      Social History   Social History     Tobacco Use    Smoking status: Some Days     Current packs/day: 0.25     Average packs/day: 0.3 packs/day for 5.0 years (1.3 ttl pk-yrs)     Types: Cigarettes, Vaping Device    Smokeless tobacco: Former   Substance Use Topics    Alcohol use: No    Drug use: No         A medically appropriate review of systems was performed with pertinent positives and negatives noted in the HPI, and all other systems negative.    Physical Exam   BP: (!) 118/90  Pulse: 80  Temp: 98.8  F (37.1  C)  Resp: 18  SpO2: 100 %  Physical Exam    GENERAL APPEARANCE: The patient is well developed, well appearing, and in no acute distress.  HEAD:  Normocephalic and atraumatic.   EENT: Voice normal.  NECK: Trachea is midline.No lymphadenopathy or tenderness.  LUNGS: Breath sounds are equal and clear bilaterally. No wheezes, rhonchi, or rales.  HEART: Regular rate and normal rhythm.    ABDOMEN: Soft, flat, and benign. No mass, tenderness, guarding, or rebound.Bowel sounds are present.  EXTREMITIES: No cyanosis, clubbing, or edema.  NEUROLOGIC: No focal sensory or motor deficits are noted.  PSYCHIATRIC: The patient is awake, alert.  Appropriate mood and affect.  SKIN: Warm, dry, and well perfused. Good turgor.    ED Course, Procedures, & Data           Results for orders placed or performed during the hospital encounter of 05/09/24   CT Abdomen Pelvis w Contrast     Status: None    Narrative    EXAM: CT ABDOMEN  PELVIS W CONTRAST  LOCATION: [facility]  DATE: 5/9/2024    INDICATION: Report of upper abdominal pain and syncope, further evaluate  COMPARISON: CT abdomen and pelvis 03/01/2022  TECHNIQUE: CT scan of the abdomen and pelvis was performed following injection of IV contrast. Multiplanar reformats were obtained. Dose reduction techniques were used.  CONTRAST: 122mL Isovue 370    FINDINGS:   LOWER CHEST: Normal.    HEPATOBILIARY: Normal.    PANCREAS: Normal.    SPLEEN: Spleen upper range of normal in size measuring 14 cm, unchanged.    ADRENAL GLANDS: Normal.    KIDNEYS/BLADDER: Normal. No stones or hydronephrosis.    BOWEL: Appendectomy. No evidence for bowel obstruction. No bowel wall thickening or inflammatory changes..    LYMPH NODES: Normal.    VASCULATURE: Normal.    PELVIC ORGANS: Normal.    MUSCULOSKELETAL: Normal.      Impression    IMPRESSION:   1.  Unremarkable CT abdomen and pelvis exam. No findings to account for the patient's symptoms.   [place] Draw *Canceled*     Status: None ()    Narrative    The following orders were created for panel order [place] Draw.  Procedure                               Abnormality         Status                     ---------                               -----------         ------                       Please view results for these tests on the individual orders.   Comprehensive metabolic panel     Status: Abnormal   Result Value Ref Range    Sodium 143 135 - 145 mmol/L    Potassium 3.8 3.4 - 5.3 mmol/L    Carbon Dioxide (CO2) 22 22 - 29 mmol/L    Anion Gap 11 7 - 15 mmol/L    Urea Nitrogen 10.7 6.0 - 20.0 mg/dL    Creatinine 0.82 0.51 - 0.95 mg/dL    GFR Estimate >90 >60 mL/min/1.73m2    Calcium 8.2 (L) 8.6 - 10.0 mg/dL    Chloride 110 (H) 98 - 107 mmol/L    Glucose 78 70 - 99 mg/dL    Alkaline Phosphatase 64 40 - 150 U/L    AST 16 0 - 45 U/L    ALT 13 0 - 50 U/L    Protein Total 6.7 6.4 - 8.3 g/dL    Albumin 4.3 3.5 - 5.2 g/dL     Bilirubin Total <0.2 <=1.2 mg/dL   Lipase     Status: Normal   Result Value Ref Range    Lipase 41 13 - 60 U/L   CBC with platelets and differential     Status: Abnormal   Result Value Ref Range    WBC Count 9.2 4.0 - 11.0 10e3/uL    RBC Count 3.99 3.80 - 5.20 10e6/uL    Hemoglobin 10.8 (L) 11.7 - 15.7 g/dL    Hematocrit 32.9 (L) 35.0 - 47.0 %    MCV 83 78 - 100 fL    MCH 27.1 26.5 - 33.0 pg    MCHC 32.8 31.5 - 36.5 g/dL    RDW 13.4 10.0 - 15.0 %    Platelet Count 228 150 - 450 10e3/uL    % Neutrophils 59 %    % Lymphocytes 33 %    % Monocytes 6 %    % Eosinophils 2 %    % Basophils 0 %    % Immature Granulocytes 0 %    NRBCs per 100 WBC 0 <1 /100    Absolute Neutrophils 5.4 1.6 - 8.3 10e3/uL    Absolute Lymphocytes 3.1 0.8 - 5.3 10e3/uL    Absolute Monocytes 0.5 0.0 - 1.3 10e3/uL    Absolute Eosinophils 0.2 0.0 - 0.7 10e3/uL    Absolute Basophils 0.0 0.0 - 0.2 10e3/uL    Absolute Immature Granulocytes 0.0 <=0.4 10e3/uL    Absolute NRBCs 0.0 10e3/uL   CBC with platelets differential     Status: Abnormal    Narrative    The following orders were created for panel order CBC with platelets differential.  Procedure                               Abnormality         Status                     ---------                               -----------         ------                     CBC with platelets and d...[[id number]]  Abnormal            Final result                 Please view results for these tests on the individual orders.   Results for orders placed or performed during the hospital encounter of 05/09/24   EKG 12 lead     Status: None   Result Value Ref Range    Systolic Blood Pressure  mmHg    Diastolic Blood Pressure  mmHg    Ventricular Rate 77 BPM    Atrial Rate 77 BPM    WY Interval 170 ms    QRS Duration 86 ms     ms    QTc 425 ms    P Axis 45 degrees    R AXIS 28 degrees    T Axis 12 degrees    Interpretation ECG       Sinus rhythm  Possible Anterior infarct , age undetermined  Abnormal ECG  Unconfirmed  report - interpretation of this ECG is computer generated - see medical record for final interpretation  Confirmed by - EMERGENCY ROOM, PHYSICIAN ([id number]),  KRISTIE POWELL ([id number]) on 5/9/2024 6:19:23 PM       Medications   iopamidol (ISOVUE-370) solution 100 mL (122 mLs Intravenous $Given 5/9/24 2120)   sodium chloride 0.9 % bag 100mL (62 mLs Intravenous $Given 5/9/24 2121)     Labs Ordered and Resulted from Time of ED Arrival to Time of ED Departure   COMPREHENSIVE METABOLIC PANEL - Abnormal       Result Value    Sodium 143      Potassium 3.8      Carbon Dioxide (CO2) 22      Anion Gap 11      Urea Nitrogen 10.7      Creatinine 0.82      GFR Estimate >90      Calcium 8.2 (*)     Chloride 110 (*)     Glucose 78      Alkaline Phosphatase 64      AST 16      ALT 13      Protein Total 6.7      Albumin 4.3      Bilirubin Total <0.2     CBC WITH PLATELETS AND DIFFERENTIAL - Abnormal    WBC Count 9.2      RBC Count 3.99      Hemoglobin 10.8 (*)     Hematocrit 32.9 (*)     MCV 83      MCH 27.1      MCHC 32.8      RDW 13.4      Platelet Count 228      % Neutrophils 59      % Lymphocytes 33      % Monocytes 6      % Eosinophils 2      % Basophils 0      % Immature Granulocytes 0      NRBCs per 100 WBC 0      Absolute Neutrophils 5.4      Absolute Lymphocytes 3.1      Absolute Monocytes 0.5      Absolute Eosinophils 0.2      Absolute Basophils 0.0      Absolute Immature Granulocytes 0.0      Absolute NRBCs 0.0     LIPASE - Normal    Lipase 41     C. DIFFICILE TOXIN B PCR WITH REFLEX TO C. DIFFICILE ANTIGEN AND TOXINS A/B EIA   ENTERIC BACTERIA AND VIRUS PANEL BY PCR     CT Abdomen Pelvis w Contrast   Final Result   IMPRESSION:    1.  Unremarkable CT abdomen and pelvis exam. No findings to account for the patient's symptoms.      US Abdomen Limited    (Results Pending)          Critical care was not performed.     Medical Decision Making  The patient's presentation was of moderate complexity (an undiagnosed new problem  with uncertain diagnosis).    The patient's evaluation involved:  ordering and/or review of 3+ test(s) in this encounter (see separate area of note for details)    The patient's management necessitated moderate risk (prescription drug management including medications given in the ED).    Assessment & Plan    This is a 23-year-old female presenting with concerns for ongoing episodes of syncope after recent ED evaluation earlier in the day where she had some concerns for upper abdominal pain had pending imaging studies ordered and elected to leave AMA.  On return to the ED she reports she feels similar to when she discharged and reportedly had 9 more episodes of syncope.  After she returned her vitals were again checked within normal range she is not hypotensive or tachycardic.  She continues to have a soft abdomen here and has clear breath sounds does not appear toxic.  Will initiate prior workup including CBC chemistry lipase ultrasound of the upper abdomenand CT abdomen pelvis with contrast to evaluate any life-threatening etiology to explain patient's presentation.  CBC was obtained without leukocytosis patient is anemic 10.8 hemoglobin down from 12.0 however with a 1 point margin of error this is not significantly different.  Lipase here within reference range just against pancreatitis.  CT abdomen pelvis negative.  Final ultrasound read pending at time of signout.  Anticipate discharge home.  Stool studies were ordered as well to rule out C. difficile as patient is on clindamycin and has reported some loose stools in the last several ED visits.      Patient seen and discussed with attending physician , who agrees with my plan of care.    I have reviewed the nursing notes. I have reviewed the findings, diagnosis, plan and need for follow up with the patient.    New Prescriptions    No medications on file       Final diagnoses:   Syncope, unspecified syncope type       Gaby Mercedes [facility]  [facility] EMERGENCY DEPARTMENT  5/9/2024    --    ED Attending Physician Attestation    I Day Fang MD, cared for this patient with the Advanced Practice Provider (BETHANY). I have performed a history and physical examination of the patient independent of the BETHANY. I reviewed the BETHANY's documentation above and agree with the documented findings and plan of care. I personally provided a substantive portion of the care for this patient, including the complete Medical Decision Making. Please see the BETHANY's documentation for full details.    Summary of HPI, PE, ED Course   Patient is a 23 year old female evaluated in the emergency department for recurrent syncope, nausea, vomiting and diarrhea as well as abdominal pain.  Overall, reassuring evaluation with no evidence of hemodynamic instability.  Patient has had reevaluations and encounters over the last couple of days including with myself 2 other times.  Plan from earlier today was to obtain remainder of her lab work to ensure no significant abnormalities. , CT and ultrasound which were performed, and have no acute findings.  Patient has been stable, without any further symptoms since being here.  Is otherwise at her baseline from a mental health standpoint.  Will discharge back to her group home  Medical Decision Making  The patient's presentation was of high complexity (a chronic illness severe exacerbation, progression, or side effect of treatment).    The patient's evaluation involved:  review of external note(s) from 3+ sources (see separate area of note for details)  review of 3+ test result(s) ordered prior to this encounter (see separate area of note for details)  ordering and/or review of 3+ test(s) in this encounter (see separate area of note for details)    The patient's management necessitated moderate risk (prescription drug management including medications given in the ED).          Day Fang MD  Emergency Medicine        Day Fang MD  05/09/24 8240

## 2024-05-10 NOTE — ED TRIAGE NOTES
Pt reports having 3 episodes of syncope while laying in her bed.      Triage Assessment (Adult)       Row Name 05/10/24 1313          Triage Assessment    Airway WDL WDL        Respiratory WDL    Respiratory WDL WDL        Skin Circulation/Temperature WDL    Skin Circulation/Temperature WDL WDL        Cardiac WDL    Cardiac WDL WDL        Peripheral/Neurovascular WDL    Peripheral Neurovascular WDL WDL        Cognitive/Neuro/Behavioral WDL    Cognitive/Neuro/Behavioral WDL X  Pt reports having 3 episodes of syncope while laying in bed at home.

## 2024-05-10 NOTE — DISCHARGE INSTRUCTIONS
Reassuring evaluation emergency room.  Recommend continuing to follow-up with your primary care provider as directed.    If you develop any new or worsening symptoms, is important to return right away to the emergency department for further evaluation and management.

## 2024-05-10 NOTE — ED TRIAGE NOTES
BIBA. Patient was discharged per AMA earlier. Per EMS, Patient is having abdominal pain, ''passed out 9x since the discharge''. BS- 132. Vitals are stable.

## 2024-05-11 ENCOUNTER — HOSPITAL ENCOUNTER (EMERGENCY)
Facility: CLINIC | Age: 24
Discharge: NURSING, REHAB OR LONG-TERM CARE | End: 2024-05-11
Attending: EMERGENCY MEDICINE | Admitting: EMERGENCY MEDICINE
Payer: MEDICARE

## 2024-05-11 ENCOUNTER — HOSPITAL ENCOUNTER (EMERGENCY)
Facility: CLINIC | Age: 24
Discharge: HOME | End: 2024-05-11
Attending: EMERGENCY MEDICINE | Admitting: EMERGENCY MEDICINE
Payer: MEDICARE

## 2024-05-11 VITALS
DIASTOLIC BLOOD PRESSURE: 81 MMHG | HEART RATE: 96 BPM | TEMPERATURE: 99.1 F | SYSTOLIC BLOOD PRESSURE: 117 MMHG | OXYGEN SATURATION: 99 % | RESPIRATION RATE: 18 BRPM

## 2024-05-11 VITALS
DIASTOLIC BLOOD PRESSURE: 79 MMHG | RESPIRATION RATE: 18 BRPM | TEMPERATURE: 98.7 F | HEART RATE: 75 BPM | SYSTOLIC BLOOD PRESSURE: 126 MMHG | OXYGEN SATURATION: 97 %

## 2024-05-11 DIAGNOSIS — T67.9XXA HEAT EXPOSURE, INITIAL ENCOUNTER: ICD-10-CM

## 2024-05-11 DIAGNOSIS — R11.2 NAUSEA AND VOMITING, UNSPECIFIED VOMITING TYPE: ICD-10-CM

## 2024-05-11 DIAGNOSIS — R11.0 NAUSEA: ICD-10-CM

## 2024-05-11 PROCEDURE — 99283 EMERGENCY DEPT VISIT LOW MDM: CPT | Performed by: EMERGENCY MEDICINE

## 2024-05-11 PROCEDURE — 250N000011 HC RX IP 250 OP 636: Performed by: EMERGENCY MEDICINE

## 2024-05-11 PROCEDURE — 99284 EMERGENCY DEPT VISIT MOD MDM: CPT | Performed by: EMERGENCY MEDICINE

## 2024-05-11 PROCEDURE — 96372 THER/PROPH/DIAG INJ SC/IM: CPT | Performed by: EMERGENCY MEDICINE

## 2024-05-11 RX ORDER — ONDANSETRON 4 MG/1
4 TABLET, ORALLY DISINTEGRATING ORAL ONCE
Status: COMPLETED | OUTPATIENT
Start: 2024-05-11 | End: 2024-05-11

## 2024-05-11 RX ORDER — ONDANSETRON 2 MG/ML
8 INJECTION INTRAMUSCULAR; INTRAVENOUS ONCE
Status: COMPLETED | OUTPATIENT
Start: 2024-05-11 | End: 2024-05-11

## 2024-05-11 RX ORDER — ONDANSETRON 4 MG/1
4 TABLET, ORALLY DISINTEGRATING ORAL EVERY 8 HOURS PRN
Qty: 20 TABLET | Refills: 1 | Status: SHIPPED | OUTPATIENT
Start: 2024-05-11 | End: 2024-05-19

## 2024-05-11 RX ADMIN — ONDANSETRON 4 MG: 4 TABLET, ORALLY DISINTEGRATING ORAL at 17:44

## 2024-05-11 RX ADMIN — ONDANSETRON 8 MG: 2 INJECTION INTRAMUSCULAR; INTRAVENOUS at 03:13

## 2024-05-11 ASSESSMENT — ACTIVITIES OF DAILY LIVING (ADL)
ADLS_ACUITY_SCORE: 37
ADLS_ACUITY_SCORE: 39
ADLS_ACUITY_SCORE: 39

## 2024-05-11 ASSESSMENT — COLUMBIA-SUICIDE SEVERITY RATING SCALE - C-SSRS
1. IN THE PAST MONTH, HAVE YOU WISHED YOU WERE DEAD OR WISHED YOU COULD GO TO SLEEP AND NOT WAKE UP?: NO
1. IN THE PAST MONTH, HAVE YOU WISHED YOU WERE DEAD OR WISHED YOU COULD GO TO SLEEP AND NOT WAKE UP?: NO
6. HAVE YOU EVER DONE ANYTHING, STARTED TO DO ANYTHING, OR PREPARED TO DO ANYTHING TO END YOUR LIFE?: NO
6. HAVE YOU EVER DONE ANYTHING, STARTED TO DO ANYTHING, OR PREPARED TO DO ANYTHING TO END YOUR LIFE?: NO
2. HAVE YOU ACTUALLY HAD ANY THOUGHTS OF KILLING YOURSELF IN THE PAST MONTH?: NO
2. HAVE YOU ACTUALLY HAD ANY THOUGHTS OF KILLING YOURSELF IN THE PAST MONTH?: NO

## 2024-05-11 NOTE — DISCHARGE INSTRUCTIONS
TODAY'S VISIT:  You were seen today for heat exposure  -   - If you had any labs or imaging/radiology tests performed today, you should also discuss these tests with your usual provider.     FOLLOW-UP:  Please make an appointment to follow up with:  - Your Primary Care Provider. If you do not have a PCP, please call the Primary Care Center (phone: [phone] for an appointment    - Have your provider review the results from today's visit with you again to make sure no further follow-up or additional testing is needed based on those results.     OTHER INSTRUCTIONS:  - make sure to stay well hydrated    RETURN TO THE EMERGENCY DEPARTMENT  Return to the Emergency Department at any time for any new or worsening symptoms or any concerns.

## 2024-05-11 NOTE — ED PROVIDER NOTES
History     Chief Complaint   Patient presents with    Heat Exposure     Patient was out walking in the heat, and she forgot to wear shorts. It was also mentioned that she was at a store where they think she was being used by others to be a distraction as they stole goods.      HPI  Sadaf Ross is a 23 year old female with a past medical history of bipolar disorder, ADHD, borderline personality disorder, depression, intellectual disability who presents to the emergency department with a chief complaint of heat exposure.  The patient states she was outside walking in the heat and forgot to wear shorts.  She also mentioned that she was at a store where it was thought she may have been accused by others to be a distraction as a stool goods.  Patient's temperature is 99.1  F on arrival to the emergency department.  Patient states she did have some nausea and vomiting.  She brought some Pepto-Bismol with her and is wondering if she should take this.  No other new symptoms.  Patient does endorse her chronic suicidal ideation, but states this is at her baseline.    I have reviewed the Medications, Allergies, Past Medical and Surgical History, and Social History in the [employer] system.    ED CARE PLAN     The pt has become increasingly disruptive in the Emergency Department.  The pt will yell, demand, and scream and disrupt the milieu, and this happens before she finds out the recommendation to send her back to her group home.  Today, the pt postured toward the ED doctor and threatened to  punch him.   During one of her ED visits over the past week, the pt befriended another patient who is waiting for an inpatient bed.  The pt has been texting and contacting this patient.  There is concern that one of the reasons the pt is coming to the ED is to interact with this patient.        An Emergency Department Care Plan is being sought in order to reduce and extinguish the pt s numerous ED visits.  The pt does have an active  outpatient team that the pt can utilize for support and lives in a group home with 24/7 staffing.  Typically, the pt calls 911, not group home staff.  Additionally, [facility] have attempted to recommend additionally services, such as day treatment, and the pt refuses the recommendations.  It appears that the pt comes to the ED, requesting admission as she does not like her group home.        To that end, it is recommended that if the pt returns to the Emergency Department, she be triaged and if there is not any acute new symptoms, both medically and mental health-wise, the group home be called and informed that the pt is returning and medical transport be set up for her return.  If the pt becomes dysregulated, the pt should be offered/given appropriate medications.  This should not hinder her return to her group home.        9/27/22, [facility] Dele[facility]; Dr. Luis De Anda; Meche Butts [facility]    Past Medical History:   Diagnosis Date    ADHD (attention deficit hyperactivity disorder)     Bipolar 1 disorder (H)     Borderline personality disorder (H)     Depression     Depressive disorder     Intellectual disability     Obesity     Syncope      Past Surgical History:   Procedure Laterality Date    APPENDECTOMY      APPENDECTOMY       No current facility-administered medications for this encounter.     Current Outpatient Medications   Medication Sig Dispense Refill    acetaminophen (TYLENOL) 325 MG tablet Take 650 mg by mouth every 6 hours as needed for mild pain      albuterol (PROAIR HFA/PROVENTIL HFA/VENTOLIN HFA) 108 (90 Base) MCG/ACT inhaler Inhale 2 puffs into the lungs every 6 hours as needed for shortness of breath, wheezing or cough 18 g 0    ARIPiprazole lauroxil ER (ARISTADA) 882 MG/3.2ML intra-muscular Inject 882 mg into the muscle every 28 days On the 22nd of each month      bisacodyl (DULCOLAX) 5 MG EC tablet Take 1 tablet (5 mg) by mouth daily as needed for constipation 30 tablet 0    calcium carbonate  (TUMS) 500 MG chewable tablet Take 1 chew tab by mouth 2 times daily as needed for heartburn       cyclobenzaprine (FLEXERIL) 10 MG tablet Take 10 mg by mouth 3 times daily as needed for muscle spasms      dicyclomine (BENTYL) 20 MG tablet Take 20 mg by mouth 2 times daily as needed (dyspepsia)      docusate sodium (COLACE) 50 MG capsule Take 100 mg by mouth 2 times daily      famotidine (PEPCID) 20 MG tablet Take 20 mg by mouth daily      FLUoxetine (PROZAC) 40 MG capsule Take 80 mg by mouth daily      hydrocortisone, Perianal, (HYDROCORTISONE) 2.5 % cream Place rectally 2 times daily as needed for hemorrhoids 30 g 0    hydrOXYzine (ATARAX) 50 MG tablet Take 50 mg by mouth 2 times daily as needed for anxiety      ibuprofen (ADVIL/MOTRIN) 200 MG tablet Take 2 tablets (400 mg) by mouth every 6 hours as needed for pain 60 tablet 0    loperamide (IMODIUM A-D) 2 MG tablet Take 2 tablets (4 mg) in the morning.  Take 1 tablet (2 mg) with every loose stool.  Do not exceed 8 tablets in a day. 30 tablet 0    LORazepam (ATIVAN) 0.5 MG tablet Take 0.5 mg by mouth once as needed for anxiety Give as needed any time she has the urge to call 911 or to visit the ER      mupirocin (BACTROBAN) 2 % external ointment Apply topically 3 times daily 15 g 0    nitroFURantoin macrocrystal-monohydrate (MACROBID) 100 MG capsule Take 1 capsule (100 mg) by mouth 2 times daily 14 capsule 0    OLANZapine zydis (ZYPREXA) 5 MG ODT Take 1 tablet (5 mg) by mouth At Bedtime 30 tablet 0    ondansetron (ZOFRAN ODT) 4 MG ODT tab Take 1 tablet (4 mg) by mouth every 8 hours as needed for nausea 20 tablet 1    ondansetron (ZOFRAN) 4 MG tablet Take 1 tablet (4 mg) by mouth every 8 hours as needed 15 tablet 0    ondansetron (ZOFRAN) 4 MG tablet Take 4 mg by mouth every 8 hours as needed for nausea      pantoprazole (PROTONIX) 40 MG EC tablet Take 40 mg by mouth daily      polyethylene glycol (MIRALAX) 17 GM/Dose powder Take 17 g by mouth daily 510 g 0     promethazine (PHENERGAN) 12.5 MG tablet Take 12.5 mg by mouth every 4 hours      sennosides (SENOKOT) 8.6 MG tablet Take 1 tablet by mouth 2 times daily as needed for constipation      traZODone (DESYREL) 50 MG tablet Take 100 mg by mouth At Bedtime      Vitamin D, Cholecalciferol, 25 MCG (1000 UT) TABS Take 1,000 Units by mouth daily        Allergies   Allergen Reactions    Penicillin G     Penicillins Rash and Unknown     Past medical history, past surgical history, medications, and allergies were reviewed with the patient. Additional pertinent items: None    Social History     Socioeconomic History    Marital status: Single     Spouse name: Not on file    Number of children: Not on file    Years of education: Not on file    Highest education level: Not on file   Occupational History    Not on file   Tobacco Use    Smoking status: Some Days     Current packs/day: 0.25     Average packs/day: 0.3 packs/day for 5.0 years (1.3 ttl pk-yrs)     Types: Cigarettes, Vaping Device    Smokeless tobacco: Former   Substance and Sexual Activity    Alcohol use: No    Drug use: No    Sexual activity: Not Currently     Partners: Female, Male     Birth control/protection: Injection   Other Topics Concern    Not on file   Social History Narrative    Not on file     Social Determinants of Health     Financial Resource Strain: At Risk (1/20/2021)    [facility] from [facility]     Financial Resource Strain     Financial Resource Strain: 2   Food Insecurity: At Risk (1/20/2021)    [facility] from [facility]     Food Insecurity     Food: 2   Transportation Needs: At Risk (1/20/2021)    Received from [facility]     Transportation Needs     Transportation: 2   Physical Activity: Not on File (7/17/2020)    [facility] from [facility]     Physical Activity     Physical Activity: 0   Stress: Not at Risk (1/20/2021)    [facility] from [facility]     Stress     Stress: 1   Social Connections: Not at Risk (1/20/2021)    [facility]     Social Connections     Social Connections and Isolation: 1   Interpersonal Safety: Not At Risk (2024)    Received from [facility]     Humiliation, Afraid, Rape, and Kick questionnaire     Fear of Current or Ex-Partner: No     Emotionally Abused: No     Physically Abused: No     Sexually Abused: No   Housing Stability: Not at Risk (2021)    Received from [facility]     Housing Stability     Housin     Social history was reviewed with the patient. Additional pertinent items: None    Review of Systems  A medically appropriate review of systems was performed with pertinent positives and negatives noted in the HPI, and all other systems negative.    Physical Exam   BP: 117/81  Pulse: 96  Temp: 99.1  F (37.3  C)  Resp: 18  SpO2: 99 %      General: Well nourished, well developed, NAD  HEENT: EOMI, anicteric. NCAT, MMM  Neck: no jugular venous distension, supple, nl ROM  Cardiac: Regular rate, extremities appear well-perfused  Pulm: NLB, normal RR  Skin: Warm and dry to the touch.  No rash  Extremities: No LE edema, no cyanosis, w/w/p  Neuro: A&Ox3, no gross focal deficits  Psych: Calm and cooperative, patient endorses chronic suicidal ideation.    ED Course        Procedures                        Labs Ordered and Resulted from Time of ED Arrival to Time of ED Departure - No data to display         No results found for this or any previous visit (from the past 24 hour(s)).    Labs, vital signs, and imaging studies were reviewed by me.    Medications   ondansetron (ZOFRAN ODT) ODT tab 4 mg (has no administration in time range)       Assessments & Plan (with Medical Decision Making)   Sadaf Ross is a 23 year old female who presents with concern for heat exposure.  Temp 99.1  F on arrival to the emergency department.  Patient to be given Zofran for nausea and ice water to drink.  She is advised not to take Pepto-Bismol while in the emergency department, but that she may take it  according to package directions after she returns home.  No other new symptoms reported by the patient or concerning findings on exam.    Critical care was not performed.     Medical Decision Making  The patient's presentation was of moderate complexity (an undiagnosed new problem with uncertain diagnosis).    The patient's evaluation involved:  review of external note(s) from 1 sources (Care plan)    The patient's management necessitated moderate risk (prescription drug management including medications given in the ED) and moderate risk (limitations due to social determinants of health (see separate area of note for details)).    I have reviewed the nursing notes.    I have reviewed the findings, diagnosis, plan and need for follow up with the patient.    Patient to be discharged home. Advised to follow up with PCP and her psychiatric care team as needed. To return to ER immediately with any new/worsening symptoms. Plan of care discussed with patient who expresses understanding and agrees with plan of care.    New Prescriptions    No medications on file       Final diagnoses:   Nausea   Heat exposure, initial encounter       SERA WHALEN MD  5/11/2024   [facility] EMERGENCY DEPARTMENT       Sera Whalen MD  05/11/24 7370

## 2024-05-11 NOTE — ED TRIAGE NOTES
Coming from her group home with n/v x all day. Received 4mg Zofran  from EMS. VSS.  per EMS. Pt also reported she may had a seizure today. At triage, patient alert and oriented not in distress. Vitals stable.

## 2024-05-11 NOTE — ED TRIAGE NOTES
Triage Assessment (Adult)       Row Name 05/10/24 1919          Triage Assessment    Airway WDL WDL        Respiratory WDL    Respiratory WDL WDL        Skin Circulation/Temperature WDL    Skin Circulation/Temperature WDL WDL        Cardiac WDL    Cardiac WDL WDL        Peripheral/Neurovascular WDL    Peripheral Neurovascular WDL WDL        Cognitive/Neuro/Behavioral WDL    Cognitive/Neuro/Behavioral WDL WDL

## 2024-05-11 NOTE — ED PROVIDER NOTES
ED Provider Note  [facility]      History     Chief Complaint   Patient presents with    Dizziness     HPI  Sadaf Ross is a 23 year old female who presents to us with a chief complaint of nausea vomiting and dizziness.  She was seen here earlier today and discharged just a few hours ago for the same symptoms.  She stated when she got back to her group home she vomited again.  No abdominal pain.  No fevers.  She was not prescribed any nausea medication at discharge.  Past Medical History  Past Medical History:   Diagnosis Date    ADHD (attention deficit hyperactivity disorder)     Bipolar 1 disorder (H)     Borderline personality disorder (H)     Depression     Depressive disorder     Intellectual disability     Obesity     Syncope      Past Surgical History:   Procedure Laterality Date    APPENDECTOMY      APPENDECTOMY       ondansetron (ZOFRAN ODT) 4 MG ODT tab  acetaminophen (TYLENOL) 325 MG tablet  albuterol (PROAIR HFA/PROVENTIL HFA/VENTOLIN HFA) 108 (90 Base) MCG/ACT inhaler  ARIPiprazole lauroxil ER (ARISTADA) 882 MG/3.2ML intra-muscular  bisacodyl (DULCOLAX) 5 MG EC tablet  calcium carbonate (TUMS) 500 MG chewable tablet  cyclobenzaprine (FLEXERIL) 10 MG tablet  dicyclomine (BENTYL) 20 MG tablet  docusate sodium (COLACE) 50 MG capsule  famotidine (PEPCID) 20 MG tablet  FLUoxetine (PROZAC) 40 MG capsule  hydrocortisone, Perianal, (HYDROCORTISONE) 2.5 % cream  hydrOXYzine (ATARAX) 50 MG tablet  ibuprofen (ADVIL/MOTRIN) 200 MG tablet  loperamide (IMODIUM A-D) 2 MG tablet  LORazepam (ATIVAN) 0.5 MG tablet  mupirocin (BACTROBAN) 2 % external ointment  nitroFURantoin macrocrystal-monohydrate (MACROBID) 100 MG capsule  OLANZapine zydis (ZYPREXA) 5 MG ODT  ondansetron (ZOFRAN) 4 MG tablet  ondansetron (ZOFRAN) 4 MG tablet  pantoprazole (PROTONIX) 40 MG EC tablet  polyethylene glycol (MIRALAX) 17 GM/Dose powder  promethazine (PHENERGAN) 12.5 MG tablet  sennosides (SENOKOT) 8.6 MG  tablet  traZODone (DESYREL) 50 MG tablet  Vitamin D, Cholecalciferol, 25 MCG (1000 UT) TABS      Allergies   Allergen Reactions    Penicillin G     Penicillins Rash and Unknown     Family History  Family History   Problem Relation Age of Onset    Diabetes Type 1 Father     Cancer Paternal Grandfather      Social History   Social History     Tobacco Use    Smoking status: Some Days     Current packs/day: 0.25     Average packs/day: 0.3 packs/day for 5.0 years (1.3 ttl pk-yrs)     Types: Cigarettes, Vaping Device    Smokeless tobacco: Former   Substance Use Topics    Alcohol use: No    Drug use: No         A medically appropriate review of systems was performed with pertinent positives and negatives noted in the HPI, and all other systems negative.    Physical Exam   BP: 126/79  Pulse: 75  Temp: 98.7  F (37.1  C)  Resp: 18  SpO2: 97 %  Physical Exam  Constitutional:       General: She is not in acute distress.     Appearance: She is not diaphoretic.   HENT:      Head: Normocephalic and atraumatic.      Right Ear: External ear normal.      Left Ear: External ear normal.      Nose: Nose normal.   Eyes:      Extraocular Movements: Extraocular movements intact.      Conjunctiva/sclera: Conjunctivae normal.   Cardiovascular:      Rate and Rhythm: Normal rate and regular rhythm.      Heart sounds: Normal heart sounds.   Pulmonary:      Effort: Pulmonary effort is normal. No respiratory distress.      Breath sounds: Normal breath sounds.   Abdominal:      General: There is no distension.      Palpations: Abdomen is soft.      Tenderness: There is no abdominal tenderness.   Musculoskeletal:         General: No swelling or deformity.      Cervical back: Normal range of motion and neck supple.   Skin:     General: Skin is warm and dry.   Neurological:      Mental Status: Mental status is at baseline.      Comments: Alert, oriented   Psychiatric:         Mood and Affect: Mood normal.         Behavior: Behavior normal.            ED Course, Procedures, & Data      Procedures            No results found for any visits on 05/11/24.  Medications   ondansetron (ZOFRAN) injection 8 mg (has no administration in time range)     Labs Ordered and Resulted from Time of ED Arrival to Time of ED Departure - No data to display  No orders to display          Medical Decision Making  The patient's presentation is strongly suggestive of moderate complexity (an acute illness with systemic symptoms).    The patient's evaluation involved:  review of external note(s) from 3+ sources (Most recent H&P in addition to clinic and ED note)  review of 2 test result(s) ordered prior to this encounter (Most recent BMP and CBC)    The patient's management involved moderate risk (prescription drug management including medications given in the ED).    Assessment & Plan    23-year-old female presents to us with a chief complaint of recurrent nausea and vomiting.  She was evaluated a few hours ago for the similar symptoms.  Reviewed these labs.  They were unremarkable.  Vital signs today are normal.  Will give the patient a dose of IM Zofran and discharged with prescription for nausea medication.  She is comfortable with discharge and this plan.    I have reviewed the nursing notes. I have reviewed the findings, diagnosis, plan and need for follow up with the patient.    New Prescriptions    ONDANSETRON (ZOFRAN ODT) 4 MG ODT TAB    Take 1 tablet (4 mg) by mouth every 8 hours as needed for nausea       Final diagnoses:   Nausea and vomiting, unspecified vomiting type       Richie Sinha  [facility] EMERGENCY DEPARTMENT  5/11/2024     Richie Sinha,   05/11/24 0309

## 2024-05-11 NOTE — ED NOTES
Pt given discharge paperwork and instructions. Declined written prescription. No questions or concerns. RN called  staff, accepting pt back, states someone will be there to let pt back in. Per MD, okay to send back via cab. Cab called, RN walked pt to cab. Pt A&O x4. Ambulatory with steady gait

## 2024-05-11 NOTE — ED PROVIDER NOTES
[facility] [place] ([facility])    5/10/24      ED PROVIDER NOTE    History     Chief Complaint   Patient presents with    Nausea    Vomiting     HPI  Sadaf Ross is a 23 year old female with PMH notable for anxiety and depression, intellectual disability, BPD, bipolar I, GERD, and frequent ED visits who presents to the Emergency Department with nausea and vomiting.  Patient has been seen and had full evaluation medically yesterday at this time on further evaluation I discussed with the patient whether or not the nausea may be related to underlying anxiety she admits that this may be the case and states that she is actually feeling much improved and is requesting discharge back to the group home.  No further vomiting vitals were stable patient will be discharged to group home on her request.    Past Medical History  Past Medical History:   Diagnosis Date    ADHD (attention deficit hyperactivity disorder)     Bipolar 1 disorder (H)     Borderline personality disorder (H)     Depression     Depressive disorder     Intellectual disability     Obesity     Syncope      Past Surgical History:   Procedure Laterality Date    APPENDECTOMY      APPENDECTOMY       acetaminophen (TYLENOL) 325 MG tablet  albuterol (PROAIR HFA/PROVENTIL HFA/VENTOLIN HFA) 108 (90 Base) MCG/ACT inhaler  ARIPiprazole lauroxil ER (ARISTADA) 882 MG/3.2ML intra-muscular  bisacodyl (DULCOLAX) 5 MG EC tablet  calcium carbonate (TUMS) 500 MG chewable tablet  cyclobenzaprine (FLEXERIL) 10 MG tablet  dicyclomine (BENTYL) 20 MG tablet  docusate sodium (COLACE) 50 MG capsule  famotidine (PEPCID) 20 MG tablet  FLUoxetine (PROZAC) 40 MG capsule  hydrocortisone, Perianal, (HYDROCORTISONE) 2.5 % cream  hydrOXYzine (ATARAX) 50 MG tablet  ibuprofen (ADVIL/MOTRIN) 200 MG tablet  loperamide (IMODIUM A-D) 2 MG tablet  LORazepam (ATIVAN) 0.5 MG tablet  mupirocin (BACTROBAN) 2 % external ointment  nitroFURantoin macrocrystal-monohydrate  (MACROBID) 100 MG capsule  OLANZapine zydis (ZYPREXA) 5 MG ODT  ondansetron (ZOFRAN) 4 MG tablet  ondansetron (ZOFRAN) 4 MG tablet  pantoprazole (PROTONIX) 40 MG EC tablet  polyethylene glycol (MIRALAX) 17 GM/Dose powder  promethazine (PHENERGAN) 12.5 MG tablet  sennosides (SENOKOT) 8.6 MG tablet  traZODone (DESYREL) 50 MG tablet  Vitamin D, Cholecalciferol, 25 MCG (1000 UT) TABS      Allergies   Allergen Reactions    Penicillin G     Penicillins Rash and Unknown     Family History  Family History   Problem Relation Age of Onset    Diabetes Type 1 Father     Cancer Paternal Grandfather      Social History   Social History     Tobacco Use    Smoking status: Some Days     Current packs/day: 0.25     Average packs/day: 0.3 packs/day for 5.0 years (1.3 ttl pk-yrs)     Types: Cigarettes, Vaping Device    Smokeless tobacco: Former   Substance Use Topics    Alcohol use: No    Drug use: No         A complete review of systems was performed with pertinent positives and negatives noted in the HPI, and all other systems negative.    Physical Exam   BP: 129/87  Pulse: 76  Temp: 98.8  F (37.1  C)  Resp: 18  SpO2: 100 %  Physical Exam  Constitutional:       General: She is not in acute distress.     Appearance: Normal appearance. She is not diaphoretic.   HENT:      Head: Atraumatic.      Mouth/Throat:      Mouth: Mucous membranes are moist.   Eyes:      General: No scleral icterus.     Conjunctiva/sclera: Conjunctivae normal.   Cardiovascular:      Rate and Rhythm: Normal rate.      Heart sounds: Normal heart sounds.   Pulmonary:      Effort: No respiratory distress.      Breath sounds: Normal breath sounds.   Abdominal:      General: Abdomen is flat.   Musculoskeletal:      Cervical back: Neck supple.   Skin:     General: Skin is warm.      Findings: No rash.   Neurological:      General: No focal deficit present.      Mental Status: She is alert and oriented to person, place, and time.      Sensory: No sensory deficit.       Motor: No weakness.      Coordination: Coordination normal.   Psychiatric:         Mood and Affect: Mood is anxious.         Speech: Speech normal.         Behavior: Behavior is cooperative.         Thought Content: Thought content does not include homicidal or suicidal ideation.           ED Course, Procedures, & Data      Procedures          No results found for any visits on 05/10/24.  Medications - No data to display  Labs Ordered and Resulted from Time of ED Arrival to Time of ED Departure - No data to display  No orders to display          Critical care was not performed.     Medical Decision Making  The patient's presentation was of moderate complexity (a chronic illness mild to moderate exacerbation, progression, or side effect of treatment).    The patient's evaluation involved:  history and exam without other MDM data elements    The patient's management necessitated only low risk treatment.    Assessment & Plan        I have reviewed the nursing notes. I have reviewed the findings, diagnosis, plan and need for follow up with the patient.        Final diagnoses:   Nausea   Anxiety       Robert Olea MD  [facility] EMERGENCY DEPARTMENT  5/10/2024        Robert Olea MD  05/10/24 0122

## 2024-05-12 ENCOUNTER — HOSPITAL ENCOUNTER (EMERGENCY)
Facility: CLINIC | Age: 24
Discharge: HOME | End: 2024-05-12
Attending: EMERGENCY MEDICINE | Admitting: EMERGENCY MEDICINE
Payer: MEDICARE

## 2024-05-12 ENCOUNTER — HOSPITAL ENCOUNTER (EMERGENCY)
Facility: CLINIC | Age: 24
Discharge: HOME | End: 2024-05-12
Attending: FAMILY MEDICINE | Admitting: FAMILY MEDICINE
Payer: MEDICARE

## 2024-05-12 VITALS
OXYGEN SATURATION: 99 % | RESPIRATION RATE: 16 BRPM | TEMPERATURE: 98.2 F | HEART RATE: 83 BPM | SYSTOLIC BLOOD PRESSURE: 130 MMHG | BODY MASS INDEX: 44.29 KG/M2 | HEIGHT: 63 IN | DIASTOLIC BLOOD PRESSURE: 83 MMHG

## 2024-05-12 VITALS
SYSTOLIC BLOOD PRESSURE: 120 MMHG | HEART RATE: 80 BPM | OXYGEN SATURATION: 98 % | DIASTOLIC BLOOD PRESSURE: 81 MMHG | TEMPERATURE: 98.2 F | RESPIRATION RATE: 16 BRPM

## 2024-05-12 VITALS
OXYGEN SATURATION: 100 % | RESPIRATION RATE: 18 BRPM | TEMPERATURE: 97.2 F | HEART RATE: 80 BPM | SYSTOLIC BLOOD PRESSURE: 130 MMHG | DIASTOLIC BLOOD PRESSURE: 84 MMHG

## 2024-05-12 DIAGNOSIS — F41.9 ANXIETY: ICD-10-CM

## 2024-05-12 DIAGNOSIS — R45.851 SUICIDAL IDEATION: ICD-10-CM

## 2024-05-12 DIAGNOSIS — F31.9 BIPOLAR 1 DISORDER (H): ICD-10-CM

## 2024-05-12 DIAGNOSIS — F43.21 GRIEF REACTION: ICD-10-CM

## 2024-05-12 PROCEDURE — 250N000011 HC RX IP 250 OP 636: Performed by: EMERGENCY MEDICINE

## 2024-05-12 PROCEDURE — 99283 EMERGENCY DEPT VISIT LOW MDM: CPT | Performed by: EMERGENCY MEDICINE

## 2024-05-12 PROCEDURE — 99284 EMERGENCY DEPT VISIT MOD MDM: CPT | Performed by: FAMILY MEDICINE

## 2024-05-12 PROCEDURE — 99284 EMERGENCY DEPT VISIT MOD MDM: CPT | Performed by: EMERGENCY MEDICINE

## 2024-05-12 PROCEDURE — 250N000013 HC RX MED GY IP 250 OP 250 PS 637: Performed by: FAMILY MEDICINE

## 2024-05-12 PROCEDURE — 99283 EMERGENCY DEPT VISIT LOW MDM: CPT | Mod: 27 | Performed by: FAMILY MEDICINE

## 2024-05-12 RX ORDER — ONDANSETRON 4 MG/1
4 TABLET, ORALLY DISINTEGRATING ORAL ONCE
Status: COMPLETED | OUTPATIENT
Start: 2024-05-12 | End: 2024-05-12

## 2024-05-12 RX ORDER — HYDROXYZINE HYDROCHLORIDE 25 MG/1
25 TABLET, FILM COATED ORAL ONCE
Status: COMPLETED | OUTPATIENT
Start: 2024-05-12 | End: 2024-05-12

## 2024-05-12 RX ADMIN — HYDROXYZINE HYDROCHLORIDE 25 MG: 25 TABLET, FILM COATED ORAL at 15:05

## 2024-05-12 RX ADMIN — ONDANSETRON 4 MG: 4 TABLET, ORALLY DISINTEGRATING ORAL at 14:32

## 2024-05-12 ASSESSMENT — ACTIVITIES OF DAILY LIVING (ADL)
ADLS_ACUITY_SCORE: 39

## 2024-05-12 ASSESSMENT — COLUMBIA-SUICIDE SEVERITY RATING SCALE - C-SSRS
5. HAVE YOU STARTED TO WORK OUT OR WORKED OUT THE DETAILS OF HOW TO KILL YOURSELF? DO YOU INTEND TO CARRY OUT THIS PLAN?: YES
2. HAVE YOU ACTUALLY HAD ANY THOUGHTS OF KILLING YOURSELF IN THE PAST MONTH?: YES
3. HAVE YOU BEEN THINKING ABOUT HOW YOU MIGHT KILL YOURSELF?: YES
6. HAVE YOU EVER DONE ANYTHING, STARTED TO DO ANYTHING, OR PREPARED TO DO ANYTHING TO END YOUR LIFE?: NO
4. HAVE YOU HAD THESE THOUGHTS AND HAD SOME INTENTION OF ACTING ON THEM?: YES
5. HAVE YOU STARTED TO WORK OUT OR WORKED OUT THE DETAILS OF HOW TO KILL YOURSELF? DO YOU INTEND TO CARRY OUT THIS PLAN?: YES
4. HAVE YOU HAD THESE THOUGHTS AND HAD SOME INTENTION OF ACTING ON THEM?: NO
6. HAVE YOU EVER DONE ANYTHING, STARTED TO DO ANYTHING, OR PREPARED TO DO ANYTHING TO END YOUR LIFE?: YES
2. HAVE YOU ACTUALLY HAD ANY THOUGHTS OF KILLING YOURSELF IN THE PAST MONTH?: YES
1. IN THE PAST MONTH, HAVE YOU WISHED YOU WERE DEAD OR WISHED YOU COULD GO TO SLEEP AND NOT WAKE UP?: YES
1. IN THE PAST MONTH, HAVE YOU WISHED YOU WERE DEAD OR WISHED YOU COULD GO TO SLEEP AND NOT WAKE UP?: YES
2. HAVE YOU ACTUALLY HAD ANY THOUGHTS OF KILLING YOURSELF IN THE PAST MONTH?: YES
1. IN THE PAST MONTH, HAVE YOU WISHED YOU WERE DEAD OR WISHED YOU COULD GO TO SLEEP AND NOT WAKE UP?: YES
5. HAVE YOU STARTED TO WORK OUT OR WORKED OUT THE DETAILS OF HOW TO KILL YOURSELF? DO YOU INTEND TO CARRY OUT THIS PLAN?: NO
3. HAVE YOU BEEN THINKING ABOUT HOW YOU MIGHT KILL YOURSELF?: NO
4. HAVE YOU HAD THESE THOUGHTS AND HAD SOME INTENTION OF ACTING ON THEM?: NO
3. HAVE YOU BEEN THINKING ABOUT HOW YOU MIGHT KILL YOURSELF?: YES
6. HAVE YOU EVER DONE ANYTHING, STARTED TO DO ANYTHING, OR PREPARED TO DO ANYTHING TO END YOUR LIFE?: YES

## 2024-05-12 NOTE — ED NOTES
"Writer discussed discharge with patient and patient covered face and shook her head \"no.\" Writer discussed discharge with MD who stated patient should be sent back to group home via non emergent ambulance due to risk of escalation upon discharge for safety, EMS contacted and transport set up.  "

## 2024-05-12 NOTE — ED TRIAGE NOTES
"Patient is suicidal, and states to want to \"suffer and die\"     Triage Assessment (Adult)       Row Name 05/12/24 3793          Triage Assessment    Airway WDL WDL        Respiratory WDL    Respiratory WDL WDL        Skin Circulation/Temperature WDL    Skin Circulation/Temperature WDL WDL        Cardiac WDL    Cardiac WDL WDL        Peripheral/Neurovascular WDL    Peripheral Neurovascular WDL WDL        Cognitive/Neuro/Behavioral WDL    Cognitive/Neuro/Behavioral WDL WDL                     "

## 2024-05-12 NOTE — ED NOTES
Bed: UREDH-A  Expected date:   Expected time:   Means of arrival:   Comments:  Gaby [place] 733 25 y/o F SI

## 2024-05-12 NOTE — ED TRIAGE NOTES
Pt BIBA with SI with a plan to bite self. Upon arrival patient tearful, stating its the anniversary of her sister's death and she just wants to die. Pt wailing upon assessment     Triage Assessment (Adult)       Row Name 05/12/24 0456          Triage Assessment    Airway WDL WDL        Respiratory WDL    Respiratory WDL WDL        Skin Circulation/Temperature WDL    Skin Circulation/Temperature WDL WDL        Cardiac WDL    Cardiac WDL WDL        Peripheral/Neurovascular WDL    Peripheral Neurovascular WDL WDL        Cognitive/Neuro/Behavioral WDL    Cognitive/Neuro/Behavioral WDL mood/behavior     Mood/Behavior sad

## 2024-05-12 NOTE — DISCHARGE INSTRUCTIONS
You took a as needed hydroxyzine here at the emergency department.  Continue to work on your coping skills and discuss your grief with the staff.  Continue your other group home programming

## 2024-05-12 NOTE — ED NOTES
Bed: UREDH-E  Expected date:   Expected time:   Means of arrival:   Comments:  Gaby [place] 726 24 y/o F SI

## 2024-05-12 NOTE — ED PROVIDER NOTES
ED Provider Note  [facility]      History     Chief Complaint   Patient presents with    Suicidal     Pt presents with SI. Upon arrival patient tearful, stating its the anniversary of her sister's death and she just wants to die. Pt wailing upon assessment     HPI  Sadaf Ross is a 23 year old female who presents to us with a chief complaint of suicidal ideation.  She reports is the anniversary of her sister's death.  No overdose or substance abuse today.  She denies all other complaints.    Past Medical History  Past Medical History:   Diagnosis Date    ADHD (attention deficit hyperactivity disorder)     Bipolar 1 disorder (H)     Borderline personality disorder (H)     Depression     Depressive disorder     Intellectual disability     Obesity     Syncope      Past Surgical History:   Procedure Laterality Date    APPENDECTOMY      APPENDECTOMY       acetaminophen (TYLENOL) 325 MG tablet  albuterol (PROAIR HFA/PROVENTIL HFA/VENTOLIN HFA) 108 (90 Base) MCG/ACT inhaler  ARIPiprazole lauroxil ER (ARISTADA) 882 MG/3.2ML intra-muscular  bisacodyl (DULCOLAX) 5 MG EC tablet  calcium carbonate (TUMS) 500 MG chewable tablet  cyclobenzaprine (FLEXERIL) 10 MG tablet  dicyclomine (BENTYL) 20 MG tablet  docusate sodium (COLACE) 50 MG capsule  famotidine (PEPCID) 20 MG tablet  FLUoxetine (PROZAC) 40 MG capsule  hydrocortisone, Perianal, (HYDROCORTISONE) 2.5 % cream  hydrOXYzine (ATARAX) 50 MG tablet  ibuprofen (ADVIL/MOTRIN) 200 MG tablet  loperamide (IMODIUM A-D) 2 MG tablet  LORazepam (ATIVAN) 0.5 MG tablet  mupirocin (BACTROBAN) 2 % external ointment  nitroFURantoin macrocrystal-monohydrate (MACROBID) 100 MG capsule  OLANZapine zydis (ZYPREXA) 5 MG ODT  ondansetron (ZOFRAN ODT) 4 MG ODT tab  ondansetron (ZOFRAN) 4 MG tablet  ondansetron (ZOFRAN) 4 MG tablet  pantoprazole (PROTONIX) 40 MG EC tablet  polyethylene glycol (MIRALAX) 17 GM/Dose powder  promethazine (PHENERGAN) 12.5 MG  tablet  sennosides (SENOKOT) 8.6 MG tablet  traZODone (DESYREL) 50 MG tablet  Vitamin D, Cholecalciferol, 25 MCG (1000 UT) TABS      Allergies   Allergen Reactions    Penicillin G     Penicillins Rash and Unknown     Family History  Family History   Problem Relation Age of Onset    Diabetes Type 1 Father     Cancer Paternal Grandfather      Social History   Social History     Tobacco Use    Smoking status: Some Days     Current packs/day: 0.25     Average packs/day: 0.3 packs/day for 5.0 years (1.3 ttl pk-yrs)     Types: Cigarettes, Vaping Device    Smokeless tobacco: Former   Substance Use Topics    Alcohol use: No    Drug use: No      Past medical history, past surgical history, medications, allergies, family history, and social history were reviewed with the patient. No additional pertinent items.      A medically appropriate review of systems was performed with pertinent positives and negatives noted in the HPI, and all other systems negative.    Physical Exam   BP: 119/76  Pulse: 85  Temp: 98.2  F (36.8  C)  Resp: 16  SpO2: 97 %  Physical Exam  Vitals and nursing note reviewed.   Constitutional:       General: She is not in acute distress.     Appearance: She is well-developed. She is not ill-appearing.   HENT:      Head: Normocephalic and atraumatic.      Right Ear: External ear normal.      Left Ear: External ear normal.      Nose: Nose normal.   Eyes:      Extraocular Movements: Extraocular movements intact.      Conjunctiva/sclera: Conjunctivae normal.   Pulmonary:      Effort: Pulmonary effort is normal. No respiratory distress.   Abdominal:      General: There is no distension.   Musculoskeletal:         General: No swelling or deformity.      Cervical back: Normal range of motion and neck supple.   Skin:     General: Skin is warm and dry.   Neurological:      Mental Status: Mental status is at baseline.      Comments: Alert, oriented   Psychiatric:         Mood and Affect: Mood normal.         Behavior:  "Behavior normal.       ED Course, Procedures, & Data      Procedures          No results found for any visits on 05/12/24.  Medications - No data to display  Labs Ordered and Resulted from Time of ED Arrival to Time of ED Departure - No data to display  No orders to display        Medical Decision Making  The patient's presentation is strongly suggestive of low complexity (a stable chronic illness).    The patient's evaluation involved:  review of external note(s) from 3+ sources (Most recent H&P in addition to clinic and ED note)  review of 2 test result(s) ordered prior to this encounter (Most recent BMP and CBC)    The patient's management involved only low risk treatment.    Assessment & Plan    23-year-old female with a history of chronic suicidal ideation presents to us with the same today.  She is well-known to the department.  Speaking with the patient and asked what we could do to help her today if she would like medications or something else.  Patient stated back to me \"fuck off\"    Given that her suicidal ideation is chronic and stable she is safe to be discharged back to her group home per her care plan.    I have reviewed the nursing notes. I have reviewed the findings, diagnosis, plan and need for follow up with the patient.    New Prescriptions    No medications on file       Final diagnoses:   Suicidal ideation       Richie Sinha  [facility] EMERGENCY DEPARTMENT  5/12/2024     Richie Sinha,   05/12/24 0455    "

## 2024-05-12 NOTE — ED PROVIDER NOTES
[facility] EMERGENCY DEPARTMENT ([facility])    23      ED PROVIDER NOTE   Hallway [address]  History     Chief Complaint   Patient presents with    Suicidal     Plan is to bite self to death.    VSS orient X 4.  Did not remember to take her zofran and vomited.  Did not take her emergency meds.  Tried her other safety plans however.     The history is provided by the patient and medical records.     Sadaf Ross is a 23 year old female group home resident well-known to the emergency department for frequent visits with history of intellectual disability, bipolar 1 disorder, ADHD, depression, borderline personality disorder who presents to the Emergency Department via EMS due to thoughts of harming herself. The patient's younger sister Kelsey  at birth on 2011.  Patient feels more depressed whenever it is the anniversary of her death and had suicidal ideation with plan to bite herself to death.  She has had similar presentations in the past. She lives at a group home, does have staff at group home she can talk to when she is feeling this way. Also has medications as part of her safety plan, thinks it is hydroxyzine or Ativan.  She has not taken them today, states the Ativan helps.  Patient states that she was doing fine prior to this.  She has not been herself yet.  At this time she denies suicidal ideation or intent to engage in self-harm, feels that she does not have any needs at this time.  She wants to go home and go to sleep, does not feel need she needs any medications or other interventions at this time.  She states that she just needed a break. Does feel ok to go home . Paramedics didn't give her anything. Patient agrees to talk to staff if she develops recurrence of symptoms.    The patient's father Vishnu Ross  on 2021.     Past Medical History  Past Medical History:   Diagnosis Date    ADHD (attention deficit hyperactivity disorder)     Bipolar 1 disorder (H)      Borderline personality disorder (H)     Depressive disorder     Intellectual disability     Obesity     Syncope      Past Surgical History:   Procedure Laterality Date    APPENDECTOMY       acetaminophen (TYLENOL) 325 MG tablet  albuterol (PROAIR HFA/PROVENTIL HFA/VENTOLIN HFA) 108 (90 Base) MCG/ACT inhaler  ARIPiprazole lauroxil ER (ARISTADA) 882 MG/3.2ML intra-muscular  bisacodyl (DULCOLAX) 5 MG EC tablet  calcium carbonate (TUMS) 500 MG chewable tablet  cyclobenzaprine (FLEXERIL) 10 MG tablet  dicyclomine (BENTYL) 20 MG tablet  docusate sodium (COLACE) 50 MG capsule  famotidine (PEPCID) 20 MG tablet  FLUoxetine (PROZAC) 40 MG capsule  hydrocortisone, Perianal, (HYDROCORTISONE) 2.5 % cream  hydrOXYzine (ATARAX) 50 MG tablet  ibuprofen (ADVIL/MOTRIN) 200 MG tablet  loperamide (IMODIUM A-D) 2 MG tablet  LORazepam (ATIVAN) 0.5 MG tablet  mupirocin (BACTROBAN) 2 % external ointment  nitroFURantoin macrocrystal-monohydrate (MACROBID) 100 MG capsule  OLANZapine zydis (ZYPREXA) 5 MG ODT  ondansetron (ZOFRAN ODT) 4 MG ODT tab  ondansetron (ZOFRAN) 4 MG tablet  ondansetron (ZOFRAN) 4 MG tablet  pantoprazole (PROTONIX) 40 MG EC tablet  polyethylene glycol (MIRALAX) 17 GM/Dose powder  promethazine (PHENERGAN) 12.5 MG tablet  sennosides (SENOKOT) 8.6 MG tablet  traZODone (DESYREL) 50 MG tablet  Vitamin D, Cholecalciferol, 25 MCG (1000 UT) TABS      Allergies   Allergen Reactions    Penicillin G     Penicillins Rash and Unknown     Family History  Family History   Problem Relation Age of Onset    Diabetes Type 1 Father     Cancer Paternal Grandfather      Social History   Social History     Tobacco Use    Smoking status: Some Days     Current packs/day: 0.25     Average packs/day: 0.3 packs/day for 5.0 years (1.3 ttl pk-yrs)     Types: Cigarettes, Vaping Device    Smokeless tobacco: Former   Substance Use Topics    Alcohol use: No    Drug use: No         A medically appropriate review of systems was performed with pertinent  "positives and negatives noted in the HPI, and all other systems negative.    Physical Exam   BP: 134/75  Pulse: 89  Temp: 98.7  F (37.1  C)  Resp: 16  Height: 160 cm (5' 3\")  SpO2: 97 %    Physical Exam    ED Course, Procedures, & Data      Procedures            No results found for any visits on 24.  Medications   ondansetron (ZOFRAN ODT) ODT tab 4 mg (4 mg Oral $Given 24 1432)   hydrOXYzine HCl (ATARAX) tablet 25 mg (25 mg Oral $Given 24 1505)     Labs Ordered and Resulted from Time of ED Arrival to Time of ED Departure - No data to display  No orders to display          Critical care was not performed.     Medical Decision Making  The patient's presentation was of moderate complexity (a chronic illness mild to moderate exacerbation, progression, or side effect of treatment).    The patient's evaluation involved:  review of external note(s) from 3+ sources (reviewed multiple prior ED visits under similar circumstances earlier this month)    The patient's management necessitated moderate risk (prescription drug management including medications given in the ED) and high risk (a decision regarding hospitalization).    Assessment & Plan    Patient well-known to the ED due to frequent ED presentation and who has a care plan related to their history of bipolar 1 disorder, ADHD, borderline personality disorder, intellectual disability, and prior attempts at self-harm.  Today the patient was ruminating about a younger sister who  on this day in the past, became emotionally dysregulated, called 911 and was transported here.  Here the patient is calm, anxious appearing.  Is now stating they feel better would like to return to the group home.  They have a history of becoming easily emotionally dysregulated and requiring brief stabilization in the ED.  This appears to be typical of multiple prior presentations.  The patient states they had suicidal thoughts about biting themself earlier, those thoughts " have passed and they did not engage in self injury.  Patient is now cooperative, more emotionally regulated, and appears to be back at baseline.  The patient does not appear to be an acute imminent risk to themself or others. Patient does have a higher than average risk for similar behaviors due to their past behaviors and diagnoses. This episode is unfortunately part of the baseline for this patient.  A short acute hospitalization will not likely mitigate the long term risk, and is not recommended, nor a beneficial treatment for these behaviors.  Plan to discharge back to the group home as per patient wishes.    I have reviewed the nursing notes. I have reviewed the findings, diagnosis, plan and need for follow up with the patient.    Discharge Medication List as of 5/12/2024  3:20 PM          Final diagnoses:   Anxiety   Grief reaction   Bipolar 1 disorder (H)     I, Larisa Grant, am serving as a trained medical scribe to document services personally performed by Leo Lowe MD based on the provider's statements to me on May 12, 2024.  This document has been checked and approved by the attending provider.    ILeo MD, was physically present and have reviewed and verified the accuracy of this note documented by Larisa Grant, medical scribe.      Leo Lowe MD   [facility] EMERGENCY DEPARTMENT  5/12/2024           Leo Lowe MD  05/12/24 5049

## 2024-05-13 ENCOUNTER — HOSPITAL ENCOUNTER (EMERGENCY)
Facility: CLINIC | Age: 24
Discharge: HOME | End: 2024-05-13
Attending: EMERGENCY MEDICINE | Admitting: EMERGENCY MEDICINE
Payer: MEDICARE

## 2024-05-13 VITALS
DIASTOLIC BLOOD PRESSURE: 70 MMHG | RESPIRATION RATE: 16 BRPM | TEMPERATURE: 98.8 F | SYSTOLIC BLOOD PRESSURE: 143 MMHG | OXYGEN SATURATION: 99 % | HEART RATE: 93 BPM

## 2024-05-13 DIAGNOSIS — R11.2 NAUSEA AND VOMITING, UNSPECIFIED VOMITING TYPE: ICD-10-CM

## 2024-05-13 PROCEDURE — 99283 EMERGENCY DEPT VISIT LOW MDM: CPT

## 2024-05-13 PROCEDURE — 250N000011 HC RX IP 250 OP 636: Performed by: EMERGENCY MEDICINE

## 2024-05-13 RX ORDER — ONDANSETRON 4 MG/1
4 TABLET, ORALLY DISINTEGRATING ORAL ONCE
Status: COMPLETED | OUTPATIENT
Start: 2024-05-13 | End: 2024-05-13

## 2024-05-13 RX ADMIN — ONDANSETRON 4 MG: 4 TABLET, ORALLY DISINTEGRATING ORAL at 16:36

## 2024-05-13 NOTE — ED PROVIDER NOTES
Emergency Department Note      History of Present Illness     Chief Complaint  Nausea & Vomiting    HPI  Sadaf Ross is a 23 year old female who presents for nausea, and vomiting. Patient says she was outside for over an hour in the heat and has had multiple episodes of emesis and nausea. She denies any abdominal pain, diarrhea, or constipation. Patient was given anti-nausea medication prior to exam and is now feeling better.     Independent Historian  None    Review of External Notes  Chart review of care note clinic notes    Past Medical History   Medical History and Problem List  ADHD   Bipolar disorder  Borderline personality disorder  Depression   Intellectual disability   Obesity   Syncope     Medications  albuterol   Aripiprazole   bisacodyl   cyclobenzaprine   dicyclomine   docusate sodium  famotidine   Fluoxetine  hydroxyzine   loperamide   Lorazepam   nitrofurantoin   Olanzapine   ondansetron   pantoprazole  promethazine   sennosides  trazodone     Surgical History   Appendectomy     Physical Exam   Patient Vitals for the past 24 hrs:   BP Temp Temp src Pulse Resp SpO2   05/13/24 1633 (!) 143/70 98.8  F (37.1  C) Temporal 93 16 99 %     Physical Exam  GENERAL: well developed, pleasant  HEAD: atraumatic  EYES: pupils reactive, extraocular muscles intact, conjunctivae normal  ENT:  mucus membranes moist  NECK:  trachea midline, normal range of motion  RESPIRATORY: no tachypnea, breath sounds clear to auscultation   CVS: normal S1/S2, no murmurs, intact distal pulses  ABDOMEN: soft, nontender, nondistention  MUSCULOSKELETAL: no deformities  SKIN: warm and dry, no acute rashes or ulceration  NEURO: GCS 15, cranial nerves intact, alert and oriented x3  PSYCH:  Mood/affect normal    Diagnostics   Lab Results   None    Imaging  None    Independent Interpretation  None  ED Course    Medications Administered  Medications   ondansetron (ZOFRAN ODT) ODT tab 4 mg (4 mg Oral $Given 5/13/24 9374)     Discussion  of Management  None    Social Determinants of Health adding to complexity of care  None    ED Course  ED Course as of 05/13/24 1657   Mon May 13, 2024   1650 I obtained history and examined the patient as noted above.      Medical Decision Making / Diagnosis   LUCY Ross is a 23 year old female presents with feeling like she got overheated with episode of nausea and vomiting.  She was given Zofran and now feeling better and would like to leave.  I see that she has a history of frequent ED visits.  She has a benign abdominal exam.  She notes she has medicine at home.  She has been calm and cooperative.  Patient is discharged.    Disposition  The patient was discharged.     ICD-10 Codes:    ICD-10-CM    1. Nausea and vomiting, unspecified vomiting type  R11.2            Discharge Medications  Discharge Medication List as of 5/13/2024  4:55 PM        Scribe Disclosure:  IBeatrice, am serving as a scribe at 4:57 PM on 5/13/2024 to document services personally performed by Go Zaragoza MD   based on my observations and the provider's statements to me.     [employer]      Go Zaragoza MD  05/13/24 0616

## 2024-05-13 NOTE — ED TRIAGE NOTES
Pt reports vomiting twice today and reports she over heated today outside.    Denies dizziness or light headed

## 2024-05-13 NOTE — ED PROVIDER NOTES
"  History     Chief Complaint   Patient presents with    Suicidal     Patient arrives tearful, stating she is suicidal and wants to suffer and die.      HPI  Sadaf Ross is a 23 year old female with a past medical history of borderline personality disorder, depression, bipolar disorder, ADHD, chronic suicidal ideation who presents to the emergency department with a chief complaint of suicidal ideation.  The patient is tearful and states she is suicidal again and wants to \"suffer and die.\"  This is the patient's fifth ER visit since yesterday (she was seen at 2:54 AM yesterday, 4:39 PM yesterday, 4:35 AM today, 2:11 PM today, and again now).  The patient was also seen on 5/10, twice on , once on , twice on , once on , once on 5/3, as well as 27 times in the month of April, 31 times in the month of March 17 times in the month of February, and 16 times in the month of January.  She is frequently seen for her chronic suicidal ideation as well as other concerns.  In the last 2 days, she has been seen once for dizziness, once for heat exposure, and twice for suicidal ideation.  The patient had a plan earlier to \"bite herself to death.\"  The patient had reported that this is the anniversary of her sister's death (who  at birth), which is a major stressor for her.  The patient states she is uncertain what she was hoping to gain by coming to the emergency department so many times today, but she did not know what else to do.  Patient states that she has been biting herself.    I have reviewed the Medications, Allergies, Past Medical and Surgical History, and Social History in the [facility] system.    Past Medical History:   Diagnosis Date    ADHD (attention deficit hyperactivity disorder)     Bipolar 1 disorder (H)     Borderline personality disorder (H)     Depressive disorder     Intellectual disability     Obesity     Syncope      Past Surgical History:   Procedure Laterality Date    APPENDECTOMY       No " current facility-administered medications for this encounter.     Current Outpatient Medications   Medication Sig Dispense Refill    acetaminophen (TYLENOL) 325 MG tablet Take 650 mg by mouth every 6 hours as needed for mild pain      albuterol (PROAIR HFA/PROVENTIL HFA/VENTOLIN HFA) 108 (90 Base) MCG/ACT inhaler Inhale 2 puffs into the lungs every 6 hours as needed for shortness of breath, wheezing or cough 18 g 0    ARIPiprazole lauroxil ER (ARISTADA) 882 MG/3.2ML intra-muscular Inject 882 mg into the muscle every 28 days On the 22nd of each month      bisacodyl (DULCOLAX) 5 MG EC tablet Take 1 tablet (5 mg) by mouth daily as needed for constipation 30 tablet 0    calcium carbonate (TUMS) 500 MG chewable tablet Take 1 chew tab by mouth 2 times daily as needed for heartburn       cyclobenzaprine (FLEXERIL) 10 MG tablet Take 10 mg by mouth 3 times daily as needed for muscle spasms      dicyclomine (BENTYL) 20 MG tablet Take 20 mg by mouth 2 times daily as needed (dyspepsia)      docusate sodium (COLACE) 50 MG capsule Take 100 mg by mouth 2 times daily      famotidine (PEPCID) 20 MG tablet Take 20 mg by mouth daily      FLUoxetine (PROZAC) 40 MG capsule Take 80 mg by mouth daily      hydrocortisone, Perianal, (HYDROCORTISONE) 2.5 % cream Place rectally 2 times daily as needed for hemorrhoids 30 g 0    hydrOXYzine (ATARAX) 50 MG tablet Take 50 mg by mouth 2 times daily as needed for anxiety      ibuprofen (ADVIL/MOTRIN) 200 MG tablet Take 2 tablets (400 mg) by mouth every 6 hours as needed for pain 60 tablet 0    loperamide (IMODIUM A-D) 2 MG tablet Take 2 tablets (4 mg) in the morning.  Take 1 tablet (2 mg) with every loose stool.  Do not exceed 8 tablets in a day. 30 tablet 0    LORazepam (ATIVAN) 0.5 MG tablet Take 0.5 mg by mouth once as needed for anxiety Give as needed any time she has the urge to call 911 or to visit the ER      mupirocin (BACTROBAN) 2 % external ointment Apply topically 3 times daily 15 g 0     nitroFURantoin macrocrystal-monohydrate (MACROBID) 100 MG capsule Take 1 capsule (100 mg) by mouth 2 times daily 14 capsule 0    OLANZapine zydis (ZYPREXA) 5 MG ODT Take 1 tablet (5 mg) by mouth At Bedtime 30 tablet 0    ondansetron (ZOFRAN ODT) 4 MG ODT tab Take 1 tablet (4 mg) by mouth every 8 hours as needed for nausea 20 tablet 1    ondansetron (ZOFRAN) 4 MG tablet Take 1 tablet (4 mg) by mouth every 8 hours as needed 15 tablet 0    ondansetron (ZOFRAN) 4 MG tablet Take 4 mg by mouth every 8 hours as needed for nausea      pantoprazole (PROTONIX) 40 MG EC tablet Take 40 mg by mouth daily      polyethylene glycol (MIRALAX) 17 GM/Dose powder Take 17 g by mouth daily 510 g 0    promethazine (PHENERGAN) 12.5 MG tablet Take 12.5 mg by mouth every 4 hours      sennosides (SENOKOT) 8.6 MG tablet Take 1 tablet by mouth 2 times daily as needed for constipation      traZODone (DESYREL) 50 MG tablet Take 100 mg by mouth At Bedtime      Vitamin D, Cholecalciferol, 25 MCG (1000 UT) TABS Take 1,000 Units by mouth daily        Allergies   Allergen Reactions    Penicillin G     Penicillins Rash and Unknown     Past medical history, past surgical history, medications, and allergies were reviewed with the patient. Additional pertinent items: None    Social History     Socioeconomic History    Marital status: Single     Spouse name: Not on file    Number of children: Not on file    Years of education: Not on file    Highest education level: Not on file   Occupational History    Not on file   Tobacco Use    Smoking status: Some Days     Current packs/day: 0.25     Average packs/day: 0.3 packs/day for 5.0 years (1.3 ttl pk-yrs)     Types: Cigarettes, Vaping Device    Smokeless tobacco: Former   Substance and Sexual Activity    Alcohol use: No    Drug use: No    Sexual activity: Not Currently     Partners: Female, Male     Birth control/protection: Injection   Other Topics Concern    Not on file   Social History Narrative    Not on  file     Social Determinants of Health     Financial Resource Strain: At Risk (2021)    Received from [facility]     Financial Resource Strain     Financial Resource Strain: 2   Food Insecurity: At Risk (2021)    Received from [facility]     Food Insecurity     Food: 2   Transportation Needs: At Risk (2021)    Received from [facility]     Transportation Needs     Transportation: 2   Physical Activity: Not on File (2020)    Received from [facility]     Physical Activity     Physical Activity: 0   Stress: Not at Risk (2021)    Received from [facility]     Stress     Stress: 1   Social Connections: Not at Risk (2021)    Received from [facility]     Social Connections     Social Connections and Isolation: 1   Interpersonal Safety: Not At Risk (2024)    Received from [facility]     Humiliation, Afraid, Rape, and Kick questionnaire     Fear of Current or Ex-Partner: No     Emotionally Abused: No     Physically Abused: No     Sexually Abused: No   Housing Stability: Not at Risk (2021)    Received from [facility]     Housing Stability     Housin     Social history was reviewed with the patient. Additional pertinent items: None    Review of Systems  A medically appropriate review of systems was performed with pertinent positives and negatives noted in the HPI, and all other systems negative.    Physical Exam   BP: 130/84  Pulse: 80  Temp: 97.2  F (36.2  C)  Resp: 18  SpO2: 100 %      General: Well nourished, well developed  HEENT: EOMI, anicteric. NCAT, MMM  Neck: no jugular venous distension, supple, nl ROM  Cardiac: Regular rate, extremities well-perfused  Pulm: NLB, normal RR  Skin: Warm and dry to the touch.  Area of redness to left hand consistent with superficial bite bianca, no break in the skin/no bleeding, no underlying deformity to the hand, normal range of motion  Extremities: No LE edema, no cyanosis, w/w/p  Neuro: A&Ox3, no gross focal  deficits  Psych: Patient reports increasing depressed mood and suicidal ideation    ED Course        Procedures                        Labs Ordered and Resulted from Time of ED Arrival to Time of ED Departure - No data to display         No results found for this or any previous visit (from the past 24 hour(s)).    Labs, vital signs, and imaging studies were reviewed by me.    Medications - No data to display    Assessments & Plan (with Medical Decision Making)   Sadaf Ross is a 23 year old female who presents to the emergency department with suicidal ideation.  This is unfortunately a chronic issue for the patient.  Symptoms appear consistent with the patient's baseline.  Patient is unlikely to benefit from further mental health assessment or inpatient hospitalization at this time.  I asked the patient what she was hoping for in coming to the emergency department today, she is uncertain, but states she did not know what else to do to help manage the difficult day she is having.  I discussed the patient's ER care plan with her, she is well aware of her plan that is in place.  She is okay with being discharged back to her group home at this time.    Critical care was not performed.     Medical Decision Making  The patient's presentation was of moderate complexity (a chronic illness mild to moderate exacerbation, progression, or side effect of treatment).    The patient's evaluation involved:  review of external note(s) from 3+ sources ([facility] ER, [place] ER, [facility] ER, our previous ER notes, patient's ER care plan)    The patient's management necessitated moderate risk (limitations due to social determinants of health (see separate area of note for details)).    I have reviewed the nursing notes.    I have reviewed the findings, diagnosis, plan and need for follow up with the patient.    Patient to be discharged home. Advised to follow up with PCP and mental health resources as scheduled. To return to  ER immediately with any new/worsening symptoms. Plan of care discussed with patient who expresses understanding and agrees with plan of care.    New Prescriptions    No medications on file       Final diagnoses:   Suicidal ideation       SERA WHALEN MD  5/12/2024   [facility] EMERGENCY DEPARTMENT       Sera Whalen MD  05/12/24 5519

## 2024-05-13 NOTE — DISCHARGE INSTRUCTIONS
TODAY'S VISIT:  You were seen today for suicidal thoughts   -   - If you had any labs or imaging/radiology tests performed today, you should also discuss these tests with your usual provider.     FOLLOW-UP:  Please make an appointment to follow up with:  - Your Primary Care Provider. If you do not have a PCP, please call the Primary Care Center (phone: [phone] for an appointment    - Have your provider review the results from today's visit with you again to make sure no further follow-up or additional testing is needed based on those results.     RETURN TO THE EMERGENCY DEPARTMENT  Return to the Emergency Department at any time for any new or worsening symptoms or any concerns.

## 2024-05-14 ENCOUNTER — HOSPITAL ENCOUNTER (EMERGENCY)
Facility: CLINIC | Age: 24
Discharge: NURSING, REHAB OR LONG-TERM CARE | End: 2024-05-14
Attending: EMERGENCY MEDICINE | Admitting: EMERGENCY MEDICINE
Payer: MEDICARE

## 2024-05-14 DIAGNOSIS — F41.9 ANXIETY: ICD-10-CM

## 2024-05-14 DIAGNOSIS — F43.21 GRIEF REACTION: ICD-10-CM

## 2024-05-14 PROCEDURE — 99283 EMERGENCY DEPT VISIT LOW MDM: CPT | Performed by: EMERGENCY MEDICINE

## 2024-05-14 NOTE — ED TRIAGE NOTES
Pt called EMS because she is upset about getting a new PSA. Pt states she want to bite herself.  Patient contracted for safety and said she will not bite herself.

## 2024-05-14 NOTE — ED PROVIDER NOTES
ED Provider Note  [facility]      History     Chief Complaint   Patient presents with    Mental Health Problem     Pt states she is getting a new PSA. She is not happy about it and states she wants to bite herself. Pt states she will not bite herself     HPI  Sadaf Ross is a 23 year old female with hx of intellectual disability, borderline personality disorder who presents to the ED feeling suicidal because she is getting a new PCA and she is sad about now working with [facility] anymore.  She says she really cares about [facility] and [facility] cares about her. This makes her feel suicidal. She did meet her new PSA and they will be able to take her out more.  [facility] lives in [place] so could never take her out.  She would like to go back to her group home now and feels she can be safe. She will use music as a way to feel better.       Physical Exam      Physical Exam  Vitals and nursing note reviewed.   Constitutional:       Appearance: She is obese.   HENT:      Head: Normocephalic and atraumatic.      Nose: Nose normal.   Eyes:      Extraocular Movements: Extraocular movements intact.   Pulmonary:      Effort: Pulmonary effort is normal.   Musculoskeletal:         General: No signs of injury. Normal range of motion.      Cervical back: Normal range of motion.   Skin:     General: Skin is warm and dry.   Neurological:      General: No focal deficit present.      Mental Status: She is alert and oriented to person, place, and time.   Psychiatric:         Attention and Perception: Attention and perception normal.         Mood and Affect: Mood is anxious.         Speech: Speech normal.         Behavior: Agitated: yelling at nursing staff initially but calms when i speak to her.         Thought Content: Thought content is not paranoid or delusional. Thought content includes suicidal (chronic and baseline. feels she can contract for safety) ideation. Thought content does not include homicidal ideation.  Thought content does not include homicidal or suicidal plan.         Cognition and Memory: Cognition is impaired. Memory is not impaired. She does not exhibit impaired recent memory or impaired remote memory.         Judgment: Judgment is impulsive.           ED Course, Procedures, & Data      Procedures        No results found for any visits on 05/14/24.  Medications - No data to display  Labs Ordered and Resulted from Time of ED Arrival to Time of ED Departure - No data to display  No orders to display          Critical care was not performed.     Medical Decision Making  The patient's presentation was of moderate complexity (a chronic illness mild to moderate exacerbation, progression, or side effect of treatment).    The patient's evaluation involved:  review of external note(s) from 3+ sources (3 most recent ed notes)    The patient's management necessitated only low risk treatment.    Assessment & Plan    Sadaf Ross is a 23 year old female with hx of intellectual disability, borderline personality disorder who presents to the ED feeling suicidal because she is getting a new PCA and she is sad about now working with [facility] anymore.  Sadaf frequently comes to the ED and I have worked with her many times.  She was angry and yelling at nursing but calmed down when I came to talk to her.  We talked about how she is sad that she won't work with [facility] anymore who she cares about.  She did briefly meet her new PCA and liked them. They also will be here in [place] so Sadaf will be able to go out in the community with them and be able to do more things. She sees this as a positive.  She knows that music can help her feel calmer.  She feels that she can go back to her group home and would like to leave. She can contract for safety and will come back to the ED if she needs. She comes frequently and I assume we will see her again in the next day or so.  She likes her group home staff and feels comfortable there.  She  was discharged per her request.  She is her own legal guardian. She arranged her own ride home.     I have reviewed the nursing notes. I have reviewed the findings, diagnosis, plan and need for follow up with the patient.    New Prescriptions    No medications on file       Final diagnoses:   Anxiety   Grief reaction       Ashely Toth MD  [facility] EMERGENCY DEPARTMENT  5/14/2024     Ashely Toth MD  05/14/24 4639

## 2024-05-14 NOTE — ED NOTES
Patient arrives by paramedics with c/o suicidal thoughts with plan to bite herself to death or to take all her medication.

## 2024-05-14 NOTE — ED NOTES
Bed: [facility]  Expected date:   Expected time:   Means of arrival:   Comments:  [place] 024 23 F Depression

## 2024-05-14 NOTE — DISCHARGE INSTRUCTIONS
"Continue to listen to music when you feel sad about [facility].      Consider journaling your worries so you can get them \"out\" of you.      Continue working with your current providers.    "

## 2024-05-18 ENCOUNTER — HOSPITAL ENCOUNTER (EMERGENCY)
Facility: CLINIC | Age: 24
Discharge: HOME | End: 2024-05-18
Attending: EMERGENCY MEDICINE | Admitting: EMERGENCY MEDICINE
Payer: MEDICARE

## 2024-05-18 VITALS
HEIGHT: 63 IN | TEMPERATURE: 98.2 F | RESPIRATION RATE: 18 BRPM | WEIGHT: 250 LBS | OXYGEN SATURATION: 97 % | DIASTOLIC BLOOD PRESSURE: 60 MMHG | HEART RATE: 75 BPM | BODY MASS INDEX: 44.3 KG/M2 | SYSTOLIC BLOOD PRESSURE: 113 MMHG

## 2024-05-18 DIAGNOSIS — R45.851 SUICIDAL IDEATION: ICD-10-CM

## 2024-05-18 PROCEDURE — 99283 EMERGENCY DEPT VISIT LOW MDM: CPT | Performed by: EMERGENCY MEDICINE

## 2024-05-18 PROCEDURE — 99284 EMERGENCY DEPT VISIT MOD MDM: CPT | Performed by: EMERGENCY MEDICINE

## 2024-05-18 PROCEDURE — 87637 SARSCOV2&INF A&B&RSV AMP PRB: CPT | Mod: 59,GZ | Performed by: EMERGENCY MEDICINE

## 2024-05-18 ASSESSMENT — COLUMBIA-SUICIDE SEVERITY RATING SCALE - C-SSRS
5. HAVE YOU STARTED TO WORK OUT OR WORKED OUT THE DETAILS OF HOW TO KILL YOURSELF? DO YOU INTEND TO CARRY OUT THIS PLAN?: NO
2. HAVE YOU ACTUALLY HAD ANY THOUGHTS OF KILLING YOURSELF IN THE PAST MONTH?: YES
4. HAVE YOU HAD THESE THOUGHTS AND HAD SOME INTENTION OF ACTING ON THEM?: YES
6. HAVE YOU EVER DONE ANYTHING, STARTED TO DO ANYTHING, OR PREPARED TO DO ANYTHING TO END YOUR LIFE?: YES
3. HAVE YOU BEEN THINKING ABOUT HOW YOU MIGHT KILL YOURSELF?: YES
1. IN THE PAST MONTH, HAVE YOU WISHED YOU WERE DEAD OR WISHED YOU COULD GO TO SLEEP AND NOT WAKE UP?: YES

## 2024-05-18 ASSESSMENT — ACTIVITIES OF DAILY LIVING (ADL)
ADLS_ACUITY_SCORE: 39
ADLS_ACUITY_SCORE: 39

## 2024-05-19 ENCOUNTER — HOSPITAL ENCOUNTER (EMERGENCY)
Facility: CLINIC | Age: 24
Discharge: HOME | End: 2024-05-19
Attending: EMERGENCY MEDICINE | Admitting: EMERGENCY MEDICINE
Payer: MEDICARE

## 2024-05-19 VITALS
HEART RATE: 101 BPM | OXYGEN SATURATION: 98 % | DIASTOLIC BLOOD PRESSURE: 89 MMHG | RESPIRATION RATE: 16 BRPM | SYSTOLIC BLOOD PRESSURE: 139 MMHG | TEMPERATURE: 98.4 F

## 2024-05-19 DIAGNOSIS — F60.3 BORDERLINE PERSONALITY DISORDER (H): ICD-10-CM

## 2024-05-19 DIAGNOSIS — F79 INTELLECTUAL DISABILITY: ICD-10-CM

## 2024-05-19 PROCEDURE — 99285 EMERGENCY DEPT VISIT HI MDM: CPT | Performed by: EMERGENCY MEDICINE

## 2024-05-19 PROCEDURE — 99284 EMERGENCY DEPT VISIT MOD MDM: CPT | Performed by: EMERGENCY MEDICINE

## 2024-05-19 RX ORDER — AMOXICILLIN 250 MG
1 CAPSULE ORAL 2 TIMES DAILY
COMMUNITY
Start: 2024-04-15

## 2024-05-19 RX ORDER — DIVALPROEX SODIUM 250 MG/1
500 TABLET, EXTENDED RELEASE ORAL DAILY
COMMUNITY
Start: 2024-05-15

## 2024-05-19 RX ORDER — AMOXICILLIN 250 MG
1 CAPSULE ORAL 2 TIMES DAILY
COMMUNITY
Start: 2022-12-28 | End: 2024-05-19

## 2024-05-19 RX ORDER — CLONIDINE HYDROCHLORIDE 0.1 MG/1
1 TABLET ORAL DAILY
COMMUNITY
Start: 2024-04-22

## 2024-05-19 ASSESSMENT — ACTIVITIES OF DAILY LIVING (ADL)
ADLS_ACUITY_SCORE: 39

## 2024-05-19 NOTE — ED PROVIDER NOTES
ED Provider Note  [facility]      History     Chief Complaint   Patient presents with    Suicidal     Hx of SI and self harm. Patient attempting to bite self    Vomiting     HPI  Sadaf Ross is a 23 year old female with frequent ED visits who presents with suicidal ideation.  She states that she is feeling suicidal and plans to bite herself.  She has bitten herself on her hands bilaterally.  No known precipitating factors.  She also had nausea and vomiting, states she feels unwell.  No other symptoms noted.    Past Medical History  Past Medical History:   Diagnosis Date    ADHD (attention deficit hyperactivity disorder)     Bipolar 1 disorder (H)     Borderline personality disorder (H)     Depressive disorder     Intellectual disability     Obesity     Syncope      Past Surgical History:   Procedure Laterality Date    APPENDECTOMY       acetaminophen (TYLENOL) 325 MG tablet  albuterol (PROAIR HFA/PROVENTIL HFA/VENTOLIN HFA) 108 (90 Base) MCG/ACT inhaler  ARIPiprazole lauroxil ER (ARISTADA) 882 MG/3.2ML intra-muscular  bisacodyl (DULCOLAX) 5 MG EC tablet  calcium carbonate (TUMS) 500 MG chewable tablet  cyclobenzaprine (FLEXERIL) 10 MG tablet  dicyclomine (BENTYL) 20 MG tablet  docusate sodium (COLACE) 50 MG capsule  famotidine (PEPCID) 20 MG tablet  FLUoxetine (PROZAC) 40 MG capsule  hydrocortisone, Perianal, (HYDROCORTISONE) 2.5 % cream  hydrOXYzine (ATARAX) 50 MG tablet  ibuprofen (ADVIL/MOTRIN) 200 MG tablet  loperamide (IMODIUM A-D) 2 MG tablet  LORazepam (ATIVAN) 0.5 MG tablet  mupirocin (BACTROBAN) 2 % external ointment  nitroFURantoin macrocrystal-monohydrate (MACROBID) 100 MG capsule  OLANZapine zydis (ZYPREXA) 5 MG ODT  ondansetron (ZOFRAN ODT) 4 MG ODT tab  ondansetron (ZOFRAN) 4 MG tablet  ondansetron (ZOFRAN) 4 MG tablet  pantoprazole (PROTONIX) 40 MG EC tablet  polyethylene glycol (MIRALAX) 17 GM/Dose powder  promethazine (PHENERGAN) 12.5 MG tablet  sennosides (SENOKOT)  "8.6 MG tablet  traZODone (DESYREL) 50 MG tablet  Vitamin D, Cholecalciferol, 25 MCG (1000 UT) TABS      Allergies   Allergen Reactions    Penicillin G     Penicillins Rash and Unknown     Family History  Family History   Problem Relation Age of Onset    Diabetes Type 1 Father     Cancer Paternal Grandfather      Social History   Social History     Tobacco Use    Smoking status: Some Days     Current packs/day: 0.25     Average packs/day: 0.3 packs/day for 5.0 years (1.3 ttl pk-yrs)     Types: Cigarettes, Vaping Device    Smokeless tobacco: Former   Substance Use Topics    Alcohol use: No    Drug use: No         A medically appropriate review of systems was performed with pertinent positives and negatives noted in the HPI, and all other systems negative.    Physical Exam   BP: 124/81  Pulse: 99  Temp: 99  F (37.2  C)  Resp: 18  Height: 160 cm (5' 3\")  Weight: 113.4 kg (250 lb)  SpO2: 98 %  Physical Exam  Vitals and nursing note reviewed.   Constitutional:       General: She is not in acute distress.     Appearance: Normal appearance. She is not diaphoretic.   HENT:      Head: Atraumatic.      Mouth/Throat:      Mouth: Mucous membranes are moist.   Eyes:      General: No scleral icterus.     Conjunctiva/sclera: Conjunctivae normal.   Cardiovascular:      Rate and Rhythm: Normal rate.      Heart sounds: Normal heart sounds.   Pulmonary:      Effort: No respiratory distress.      Breath sounds: Normal breath sounds.   Abdominal:      General: Abdomen is flat.   Musculoskeletal:      Cervical back: Neck supple.      Comments: Bilateral superficial bite marks on hands.   Skin:     General: Skin is warm.      Findings: No rash.   Neurological:      Mental Status: She is alert.   Psychiatric:         Thought Content: Thought content includes suicidal ideation. Thought content includes suicidal plan.           ED Course, Procedures, & Data      Procedures               No results found for any visits on " 05/18/24.  Medications - No data to display  Labs Ordered and Resulted from Time of ED Arrival to Time of ED Departure - No data to display  No orders to display              Assessment & Plan    This 23-year-old female with a history of many ED visits who presents with suicidal ideation.  Patient states she is feeling suicidal with plan to bite herself.  She has been herself on her bilateral hands.  She also has had nausea vomiting feels somewhat unwell.  On exam she has superficial bite marks to her bilateral hands.  These do not go through the skin.  COVID and influenza were obtained which are negative.  We will discharge back to group home.    I have reviewed the nursing notes. I have reviewed the findings, diagnosis, plan and need for follow up with the patient.    New Prescriptions    No medications on file       Final diagnoses:   Suicidal ideation       Ramo Sawant DO  [facility] EMERGENCY DEPARTMENT  5/18/2024     Ramo Sawant,   05/19/24 0058

## 2024-05-19 NOTE — ED TRIAGE NOTES
Has been thinking about cutting but has not. These thoughts started yesterday after a friend told her she should jump off a bridge. VS stables, endorses a headache. Calm and cooperative.         Triage Assessment (Adult)       Row Name 05/19/24 1341          Triage Assessment    Airway WDL WDL        Respiratory WDL    Respiratory WDL WDL        Skin Circulation/Temperature WDL    Skin Circulation/Temperature WDL WDL        Cognitive/Neuro/Behavioral WDL    Cognitive/Neuro/Behavioral WDL WDL

## 2024-05-19 NOTE — ED PROVIDER NOTES
ED Provider Note  [facility]      History     Chief Complaint   Patient presents with    Self Harm - Deliberate     Has been thinking about cutting themselves and biting their arms but has not. These thoughts started yesterday after a friend told her she should jump off a bridge. VS stables, endorses a headache. Calm and cooperative.      HPI  Sadaf Ross is a 23 year old female with intellectual disability, BPD, bipolar who presents feeling suicidal with thoughts to cut herself with a scissors.  She lives at a group home and is her own legal guardian.  She called 911 because she woke up feeling badly.  She is switching to a new pca and is feeling sad about the loss of her old one and worried about how it will go for her new one. She admits to feeling lonely and hoping the new one can bring her into the community to do things. She says she sleeps a lot because she doesn't have anything else to do. She lives her group home. She says she is listening to music to help her feel better.       Physical Exam   BP: 139/89  Pulse: 101  Temp: 98.4  F (36.9  C)  Resp: 16  SpO2: 98 %  Physical Exam  Vitals and nursing note reviewed.   Constitutional:       General: She is not in acute distress.     Appearance: She is obese. She is not ill-appearing, toxic-appearing or diaphoretic.   HENT:      Head: Normocephalic and atraumatic.      Nose: Nose normal.   Eyes:      Extraocular Movements: Extraocular movements intact.   Cardiovascular:      Rate and Rhythm: Normal rate.   Pulmonary:      Effort: Pulmonary effort is normal.   Musculoskeletal:         General: No signs of injury.      Cervical back: Normal range of motion.   Skin:     General: Skin is warm and dry.   Neurological:      General: No focal deficit present.      Mental Status: She is alert and oriented to person, place, and time.   Psychiatric:         Attention and Perception: Attention and perception normal.         Mood and Affect:  Mood is anxious (but laughs during the evaluation and can be redirected.).         Speech: Speech normal.         Behavior: Behavior normal. Behavior is cooperative.         Thought Content: Thought content normal.         Cognition and Memory: Cognition and memory normal.         Judgment: Judgment is impulsive.           ED Course, Procedures, & Data      Procedures        No results found for any visits on 05/19/24.  Medications - No data to display  Labs Ordered and Resulted from Time of ED Arrival to Time of ED Departure - No data to display  No orders to display          Critical care was not performed.     Medical Decision Making  The patient's presentation was of moderate complexity (a chronic illness mild to moderate exacerbation, progression, or side effect of treatment).    The patient's evaluation involved:  review of external note(s) from 3+ sources (3 most recent ed visits)    The patient's management necessitated high risk (a decision regarding hospitalization).    Assessment & Plan    Sadaf Ross is a 23 year old female with intellectual disability, BPD, bipolar who presents feeling suicidal with thoughts to cut herself with a scissors.  The patient is well known to the ED and comes almost daily.  She appears at baseline. She will laugh during our discussion and can be redirected.  She was observed until feeling back to baseline and then discharged home to continue working with her current providers.     I have reviewed the nursing notes. I have reviewed the findings, diagnosis, plan and need for follow up with the patient.    New Prescriptions    No medications on file       Final diagnoses:   Intellectual disability   Borderline personality disorder (H)       Ashely Toth MD  [facility] EMERGENCY DEPARTMENT  5/19/2024     Ashely Toth MD  05/19/24 3689

## 2024-05-19 NOTE — ED TRIAGE NOTES
"Patient here with thoughts of self harm and SI.  Patient attempted to bite herself.  Patient also complaining of \"not feeling good\" vomited in ambulance and given 4mg of zofran with relief.     Triage Assessment (Adult)       Row Name 05/18/24 4136          Triage Assessment    Airway WDL WDL        Respiratory WDL    Respiratory WDL WDL        Skin Circulation/Temperature WDL    Skin Circulation/Temperature WDL WDL        Cardiac WDL    Cardiac WDL WDL        Peripheral/Neurovascular WDL    Peripheral Neurovascular WDL WDL        Cognitive/Neuro/Behavioral WDL    Cognitive/Neuro/Behavioral WDL WDL                     "

## 2024-05-19 NOTE — DISCHARGE INSTRUCTIONS
Return to your group home.  Use your coping skills such as listening to music.  Continue to work with your current providers.

## 2024-05-19 NOTE — ED NOTES
Bed: ED15  Expected date: 5/18/24  Expected time:   Means of arrival:   Comments:  N 711 24 y/o SI

## 2024-05-20 ENCOUNTER — HOSPITAL ENCOUNTER (EMERGENCY)
Facility: CLINIC | Age: 24
Discharge: HOME | End: 2024-05-20
Attending: FAMILY MEDICINE | Admitting: FAMILY MEDICINE
Payer: MEDICARE

## 2024-05-20 VITALS
RESPIRATION RATE: 18 BRPM | OXYGEN SATURATION: 98 % | WEIGHT: 242 LBS | BODY MASS INDEX: 42.88 KG/M2 | HEART RATE: 88 BPM | TEMPERATURE: 99 F | HEIGHT: 63 IN | DIASTOLIC BLOOD PRESSURE: 81 MMHG | SYSTOLIC BLOOD PRESSURE: 115 MMHG

## 2024-05-20 DIAGNOSIS — K62.5 BRIGHT RED BLOOD PER RECTUM: ICD-10-CM

## 2024-05-20 DIAGNOSIS — F60.3 BORDERLINE PERSONALITY DISORDER (H): Chronic | ICD-10-CM

## 2024-05-20 DIAGNOSIS — F79 INTELLECTUAL DISABILITY: Chronic | ICD-10-CM

## 2024-05-20 PROCEDURE — 99284 EMERGENCY DEPT VISIT MOD MDM: CPT | Performed by: FAMILY MEDICINE

## 2024-05-20 PROCEDURE — 99282 EMERGENCY DEPT VISIT SF MDM: CPT | Mod: 27 | Performed by: FAMILY MEDICINE

## 2024-05-20 PROCEDURE — 2894A VOIDCORRECT: CPT | Performed by: FAMILY MEDICINE

## 2024-05-20 PROCEDURE — 99283 EMERGENCY DEPT VISIT LOW MDM: CPT | Performed by: FAMILY MEDICINE

## 2024-05-20 RX ORDER — OLANZAPINE 10 MG/1
10 TABLET, ORALLY DISINTEGRATING ORAL ONCE
Status: COMPLETED | OUTPATIENT
Start: 2024-05-20 | End: 2024-05-20

## 2024-05-20 ASSESSMENT — COLUMBIA-SUICIDE SEVERITY RATING SCALE - C-SSRS
2. HAVE YOU ACTUALLY HAD ANY THOUGHTS OF KILLING YOURSELF IN THE PAST MONTH?: NO
6. HAVE YOU EVER DONE ANYTHING, STARTED TO DO ANYTHING, OR PREPARED TO DO ANYTHING TO END YOUR LIFE?: YES
1. IN THE PAST MONTH, HAVE YOU WISHED YOU WERE DEAD OR WISHED YOU COULD GO TO SLEEP AND NOT WAKE UP?: NO
IS THE PATIENT NOT ABLE TO COMPLETE C-SSRS: REFUSES TO ANSWER

## 2024-05-20 ASSESSMENT — ACTIVITIES OF DAILY LIVING (ADL)
ADLS_ACUITY_SCORE: 39
ADLS_ACUITY_SCORE: 37

## 2024-05-20 NOTE — DISCHARGE INSTRUCTIONS
Discharge to home with continued outpatient medications and providers follow-up with your primary care provider if any recurrence of blood in your stool

## 2024-05-20 NOTE — ED NOTES
Bed: [facility]  Expected date: 5/20/24  Expected time: 3:40 PM  Means of arrival: Ambulance  Comments:

## 2024-05-20 NOTE — ED TRIAGE NOTES
Patient presents due to nausea, vomiting, and diarrhea with bright red blood in stool. Patient denies pain at this time.      Triage Assessment (Adult)       Row Name 05/20/24 0956          Triage Assessment    Airway WDL WDL        Respiratory WDL    Respiratory WDL WDL        Skin Circulation/Temperature WDL    Skin Circulation/Temperature WDL WDL        Cardiac WDL    Cardiac WDL WDL        Peripheral/Neurovascular WDL    Peripheral Neurovascular WDL WDL        Cognitive/Neuro/Behavioral WDL    Cognitive/Neuro/Behavioral WDL WDL

## 2024-05-20 NOTE — ED TRIAGE NOTES
Pt reported she wanted to stab herself in the chest with a pen     Triage Assessment (Adult)       Row Name 05/20/24 1547          Triage Assessment    Airway WDL WDL        Respiratory WDL    Respiratory WDL --  pt refused        Skin Circulation/Temperature WDL    Skin Circulation/Temperature WDL --  pt refused        Cardiac WDL    Cardiac WDL --  pt refused        Peripheral/Neurovascular WDL    Peripheral Neurovascular WDL --  pt refused        Cognitive/Neuro/Behavioral WDL    Cognitive/Neuro/Behavioral WDL X;mood/behavior     Mood/Behavior agitated;angry;combative

## 2024-05-20 NOTE — ED PROVIDER NOTES
ED Provider Note  [facility]      History     Chief Complaint   Patient presents with    Nausea, Vomiting, & Diarrhea     Patient presents due to nausea, vomiting, and diarrhea with bright red blood in stool. Patient denies pain at this time.      RENATO Ross is a 23 year old female with a past medical history of borderline personality disorder, depression, bipolar disorder, ADHD, chronic suicidal ideation who presents to the emergency department with concerns for nausea, vomiting, and diarrhea with bright red blood in stool. She denies experiencing any pain with this.     Past Medical History  Past Medical History:   Diagnosis Date    ADHD (attention deficit hyperactivity disorder)     Bipolar 1 disorder (H)     Borderline personality disorder (H)     Depressive disorder     Intellectual disability     Obesity     Syncope      Past Surgical History:   Procedure Laterality Date    APPENDECTOMY       acetaminophen (TYLENOL) 325 MG tablet  ARIPiprazole lauroxil ER (ARISTADA) 882 MG/3.2ML intra-muscular  bisacodyl (DULCOLAX) 5 MG EC tablet  cloNIDine (CATAPRES) 0.1 MG tablet  dicyclomine (BENTYL) 20 MG tablet  divalproex sodium extended-release (DEPAKOTE ER) 250 MG 24 hr tablet  docusate sodium (COLACE) 50 MG capsule  famotidine (PEPCID) 20 MG tablet  FLUoxetine (PROZAC) 40 MG capsule  hydrOXYzine (ATARAX) 50 MG tablet  loperamide (IMODIUM A-D) 2 MG tablet  OLANZapine zydis (ZYPREXA) 5 MG ODT  ondansetron (ZOFRAN) 4 MG tablet  pantoprazole (PROTONIX) 40 MG EC tablet  promethazine (PHENERGAN) 12.5 MG tablet  senna-docusate (SENOKOT-S/PERICOLACE) 8.6-50 MG tablet  traZODone (DESYREL) 50 MG tablet  Vitamin D, Cholecalciferol, 25 MCG (1000 UT) TABS      Allergies   Allergen Reactions    Penicillin G     Penicillins Rash and Unknown     Family History  Family History   Problem Relation Age of Onset    Diabetes Type 1 Father     Cancer Paternal Grandfather      Social History   Social  "History     Tobacco Use    Smoking status: Some Days     Current packs/day: 0.25     Average packs/day: 0.3 packs/day for 5.0 years (1.3 ttl pk-yrs)     Types: Cigarettes, Vaping Device    Smokeless tobacco: Former   Substance Use Topics    Alcohol use: No    Drug use: No         A medically appropriate review of systems was performed with pertinent positives and negatives noted in the HPI, and all other systems negative.    Physical Exam   BP: 115/81  Pulse: 88  Temp: 99  F (37.2  C)  Resp: 18  Height: 160 cm (5' 3\")  Weight: 109.8 kg (242 lb)  SpO2: 98 %  Physical Exam  Constitutional:       General: She is not in acute distress.     Appearance: Normal appearance. She is not diaphoretic.   HENT:      Head: Atraumatic.      Mouth/Throat:      Mouth: Mucous membranes are moist.   Eyes:      General: No scleral icterus.     Conjunctiva/sclera: Conjunctivae normal.   Cardiovascular:      Rate and Rhythm: Normal rate.      Heart sounds: Normal heart sounds.   Pulmonary:      Effort: No respiratory distress.      Breath sounds: Normal breath sounds.   Abdominal:      General: Abdomen is flat.   Genitourinary:     Rectum: Guaiac result negative. No tenderness, anal fissure or external hemorrhoid.   Musculoskeletal:      Cervical back: Neck supple.   Skin:     General: Skin is warm.      Findings: No rash.   Neurological:      General: No focal deficit present.      Mental Status: She is alert and oriented to person, place, and time.      Sensory: No sensory deficit.      Motor: No weakness.      Coordination: Coordination normal.   Psychiatric:         Mood and Affect: Mood is anxious.         Speech: Speech normal.         Behavior: Behavior is cooperative.         Thought Content: Thought content does not include homicidal or suicidal ideation.           ED Course, Procedures, & Data      Procedures  Please refer to rectal exam.       Medications - No data to display  Labs Ordered and Resulted from Time of ED Arrival " to Time of ED Departure - No data to display  No orders to display          Critical care was not performed.     Medical Decision Making  The patient's presentation was of moderate complexity (an acute illness with systemic symptoms).    The patient's evaluation involved:  ordering and/or review of 1 test(s) in this encounter (see separate area of note for details)    The patient's management necessitated only low risk treatment.    Assessment & Plan        I have reviewed the nursing notes. I have reviewed the findings, diagnosis, plan and need for follow up with the patient.    Patient well-known to the emergency room with history of intellectual disability borderline personality disorder now stated that she had an episode of bright red blood per rectum during her evaluation here there was no evidence of any active bleeding and she was guaiac negative patient will be discharged back to group home and follow-up with primary MD.    Final diagnoses:   Intellectual disability   Borderline personality disorder (H)   Bright red blood per rectum   I, Sergio Sargent, am serving as a trained medical scribe to document services personally performed by Robert Olea MD, based on the provider's statements to me.     IRobert MD, was physically present and have reviewed and verified the accuracy of this note documented by Sergio Sargent.      Robert Olea MD  [facility] EMERGENCY DEPARTMENT  5/20/2024     Robert Olea MD  05/22/24 1872

## 2024-05-21 ENCOUNTER — HOSPITAL ENCOUNTER (EMERGENCY)
Facility: HOSPITAL | Age: 24
Discharge: HOME | End: 2024-05-22
Attending: EMERGENCY MEDICINE | Admitting: EMERGENCY MEDICINE
Payer: MEDICARE

## 2024-05-21 VITALS
HEIGHT: 63 IN | DIASTOLIC BLOOD PRESSURE: 55 MMHG | SYSTOLIC BLOOD PRESSURE: 101 MMHG | RESPIRATION RATE: 16 BRPM | TEMPERATURE: 98.1 F | OXYGEN SATURATION: 97 % | HEART RATE: 91 BPM | WEIGHT: 242 LBS | BODY MASS INDEX: 42.88 KG/M2

## 2024-05-21 DIAGNOSIS — R45.851 SUICIDAL THOUGHTS: ICD-10-CM

## 2024-05-21 PROCEDURE — 99283 EMERGENCY DEPT VISIT LOW MDM: CPT

## 2024-05-21 ASSESSMENT — COLUMBIA-SUICIDE SEVERITY RATING SCALE - C-SSRS
3. HAVE YOU BEEN THINKING ABOUT HOW YOU MIGHT KILL YOURSELF?: NO
1. IN THE PAST MONTH, HAVE YOU WISHED YOU WERE DEAD OR WISHED YOU COULD GO TO SLEEP AND NOT WAKE UP?: NO
4. HAVE YOU HAD THESE THOUGHTS AND HAD SOME INTENTION OF ACTING ON THEM?: YES
2. HAVE YOU ACTUALLY HAD ANY THOUGHTS OF KILLING YOURSELF IN THE PAST MONTH?: YES
5. HAVE YOU STARTED TO WORK OUT OR WORKED OUT THE DETAILS OF HOW TO KILL YOURSELF? DO YOU INTEND TO CARRY OUT THIS PLAN?: NO
6. HAVE YOU EVER DONE ANYTHING, STARTED TO DO ANYTHING, OR PREPARED TO DO ANYTHING TO END YOUR LIFE?: YES

## 2024-05-21 ASSESSMENT — ACTIVITIES OF DAILY LIVING (ADL): ADLS_ACUITY_SCORE: 39

## 2024-05-22 ENCOUNTER — APPOINTMENT (OUTPATIENT)
Dept: RADIOLOGY | Facility: HOSPITAL | Age: 24
End: 2024-05-22
Attending: EMERGENCY MEDICINE
Payer: MEDICARE

## 2024-05-22 VITALS
HEIGHT: 63 IN | TEMPERATURE: 98.7 F | DIASTOLIC BLOOD PRESSURE: 61 MMHG | HEART RATE: 74 BPM | SYSTOLIC BLOOD PRESSURE: 126 MMHG | BODY MASS INDEX: 44.3 KG/M2 | RESPIRATION RATE: 18 BRPM | WEIGHT: 250 LBS | OXYGEN SATURATION: 97 %

## 2024-05-22 DIAGNOSIS — J06.9 VIRAL URI WITH COUGH: ICD-10-CM

## 2024-05-22 LAB
D DIMER PPP FEU-MCNC: <0.27 UG/ML FEU (ref 0–0.5)
HOLD SPECIMEN: NORMAL
TROPONIN T SERPL HS-MCNC: <6 NG/L

## 2024-05-22 PROCEDURE — 84484 ASSAY OF TROPONIN QUANT: CPT | Performed by: EMERGENCY MEDICINE

## 2024-05-22 PROCEDURE — 71046 X-RAY EXAM CHEST 2 VIEWS: CPT

## 2024-05-22 PROCEDURE — 99285 EMERGENCY DEPT VISIT HI MDM: CPT | Mod: 25

## 2024-05-22 PROCEDURE — 36415 COLL VENOUS BLD VENIPUNCTURE: CPT | Performed by: EMERGENCY MEDICINE

## 2024-05-22 PROCEDURE — 85379 FIBRIN DEGRADATION QUANT: CPT | Performed by: EMERGENCY MEDICINE

## 2024-05-22 PROCEDURE — 93005 ELECTROCARDIOGRAM TRACING: CPT | Performed by: STUDENT IN AN ORGANIZED HEALTH CARE EDUCATION/TRAINING PROGRAM

## 2024-05-22 PROCEDURE — 93005 ELECTROCARDIOGRAM TRACING: CPT | Performed by: EMERGENCY MEDICINE

## 2024-05-22 ASSESSMENT — ACTIVITIES OF DAILY LIVING (ADL)
ADLS_ACUITY_SCORE: 39

## 2024-05-22 NOTE — CONSULTS
"Diagnostic Evaluation Consultation  Crisis Assessment    Patient Name: Sadaf Ross  Age:  23 year old  Legal Sex: female  Gender Identity: female  Pronouns:   Race: White  Ethnicity: Not  or   Language: English      Patient was assessed: Virtual: [email or web address] Crisis Assessment Start Time: 2344 Crisis Assessment Stop Time: 2354  Patient location: [facility] EMERGENCY DEPARTMENT                             JNED-21    Referral Data and Chief Complaint  Sadaf Ross presents to the ED via EMS. Patient is presenting to the ED for the following concerns: Suicidal ideation.   Factors that make the mental health crisis life threatening or complex are:  Pt presents for suicide ideation. Pt reports she tried to get a taxi here, but was kicked out because she could not pay. Pt reports she was feeling suicidal and thought about running into traffic. Pt reports she does not want to talk about the triggers, \"It was something personal.\" Pt reports she is no longer suicidal and wants to return to her group home..      Informed Consent and Assessment Methods  Explained the crisis assessment process, including applicable information disclosures and limits to confidentiality, assessed understanding of the process, and obtained consent to proceed with the assessment.  Assessment methods included conducting a formal interview with patient, review of medical records, collaboration with medical staff, and obtaining relevant collateral information from family and community providers when available.  : done     Patient response to interventions: verbalizes understanding, acceptance expressed  Coping skills were attempted to reduce the crisis:  Talked to  staff     History of the Crisis   Pt has a signficant history of ED visits with similar presentation (23 for May so far, had 30 visits in April), [place]. Pt has history " "of intellectual difficullty, borderline personality disorder, depression and PTSD.  No evidence or history of suicide attempts but does have random episodes of biting herself or scratching herself in a suicide \"attempt\".  Pt is functioning at baseline. Pt lives in a group home with 24/7 staff supervision, has a therapist, PCA, , and has a medication provider.    Brief Psychosocial History  Family:  Single, Children no  Support System:  Grandparent(s), Friend, Facility resident(s)/Staff  Employment Status:  disabled  Source of Income:  disability  Financial Environmental Concerns:  none  Current Hobbies:  music, puzzles, group/social activities, television/movies/videos  Barriers in Personal Life:  cognitive limitations, mental health concerns    Significant Clinical History  Current Anxiety Symptoms:     Current Depression/Trauma:  thoughts of death/suicide  Current Somatic Symptoms:     Current Psychosis/Thought Disturbance:  impulsive, distractability  Current Eating Symptoms:     Chemical Use History:  Alcohol: None  Benzodiazepines: None  Opiates: None  Cocaine: None  Marijuana: None  Other Use: None   Past diagnosis:  ADHD, Anxiety Disorder, Bipolar Disorder, Depression, Personality Disorder, Suicide attempt(s), PTSD  Family history:  Anxiety Disorder, Depression  Past treatment:  Individual therapy, Case management, Psychiatric Medication Management, Supportive Living Environment (group home, group home house, etc), Inpatient Hospitalization, [facility]  Details of most recent treatment:  Pt lives in a group home ([facility]) that is staffed 24/7 ([phone]). Pt has a PCA.  She has OP therapy and psychiatry. has a behavioral Analyst and states she thinks she has a mental health  as well.  Other relevant history:          Collateral Information  Is there collateral information: No (Attempted, not availble.)     Collateral information name, relationship, phone number:       What " happened today:       What is different about patient's functioning:       Concern about alcohol/drug use:      What do you think the patient needs:      Has patient made comments about wanting to kill themselves/others:      If d/c is recommended, can they take part in safety/aftercare planning:       Additional collateral information:        Risk Assessment  [place] Suicide Severity Rating Scale Full Clinical Version:  Suicidal Ideation  Q1 Wish to be Dead (Lifetime): Yes  Q2 Non-Specific Active Suicidal Thoughts (Lifetime): Yes  3. Active Suicidal Ideation with any Methods (Not Plan) Without Intent to Act (Lifetime): Yes  Q4 Active Suicidal Ideation with Some Intent to Act, Without Specific Plan (Lifetime): Yes  Q6 Suicide Behavior (Lifetime): yes     Suicidal Behavior (Lifetime)  Actual Attempt (Lifetime): Yes  Total Number of Actual Attempts (Lifetime): 5  Has subject engaged in non-suicidal self-injurious behavior? (Lifetime): Yes  Interrupted Attempts (Lifetime): No  Aborted or Self-Interrupted Attempt (Lifetime): Yes  Total Number of Aborted or Self-Interrupted Attempts (Lifetime): 5  Preparatory Acts or Behavior (Lifetime): No    [place] Suicide Severity Rating Scale Recent:   Suicidal Ideation (Recent)  Q1 Wished to be Dead (Past Month): yes  Q2 Suicidal Thoughts (Past Month): yes  Q3 Suicidal Thought Method: yes  Q4 Suicidal Intent without Specific Plan: yes  Q5 Suicide Intent with Specific Plan: yes  Within the Past 3 Months?: yes  Level of Risk per Screen: high risk  Intensity of Ideation (Recent)  Most Severe Ideation Rating (Past 1 Month): 1  Frequency (Past 1 Month): 2-5 times in week  Duration (Past 1 Month): Less than 1 hour/some of the time  Controllability (Past 1 Month): Easily able to control thoughts  Deterrents (Past 1 Month): Deterrents definitely stopped you from attempting suicide  Reasons for Ideation (Past 1 Month): Completely to get attention, revenge, or a reaction from  others  Suicidal Behavior (Recent)  Actual Attempt (Past 3 Months): No  Has subject engaged in non-suicidal self-injurious behavior? (Past 3 Months): No  Interrupted Attempts (Past 3 Months): No  Aborted or Self-Interrupted Attempt (Past 3 Months): No  Preparatory Acts or Behavior (Past 3 Months): No    Environmental or Psychosocial Events: loss of a loved one, helplessness/hopelessness, impulsivity/recklessness, other life stressors  Protective Factors: Protective Factors: strong bond to family unit, community support, or employment, responsibilities and duties to others, including pets and children, lives in a responsibly safe and stable environment, help seeking, able to access care without barriers, reality testing ability, supportive ongoing medical and mental health care relationships, constructive use of leisure time, enjoyable activities, resilience    Does the patient have thoughts of harming others? Feels Like Hurting Others: no  Previous Attempt to Hurt Others: no  Is the patient engaging in sexually inappropriate behavior?: no    Is the patient engaging in sexually inappropriate behavior?  no        Mental Status Exam   Affect: Appropriate  Appearance: Appropriate  Attention Span/Concentration: Attentive  Eye Contact: Engaged    Fund of Knowledge: Appropriate   Language /Speech Content: Fluent  Language /Speech Volume: Normal  Language /Speech Rate/Productions: Normal  Recent Memory: Intact  Remote Memory: Intact  Mood: Normal  Orientation to Person: Yes   Orientation to Place: Yes  Orientation to Time of Day: Yes  Orientation to Date: Yes     Situation (Do they understand why they are here?): Yes  Psychomotor Behavior: Normal  Thought Content: Clear  Thought Form: Intact     Mini-Cog Assessment  Number of Words Recalled:    Clock-Drawing Test:     Three Item Recall:    Mini-Cog Total Score:       Medication  Psychotropic medications:   Medication Orders - Psychiatric (From admission, onward)      None              Current Care Team  Patient Care Team:  [facility] as PCP - General (Clinic)  Chloe Alva APRN CNP as Nurse Practitioner (OB/Gyn)  Seble Jones PA-C as Physician Assistant    Diagnosis  Patient Active Problem List   Diagnosis Code    MENTAL HEALTH     Suicidal ideation R45.851    Suicidal ideations R45.851    Intellectual disability F79    Bipolar affective disorder, currently depressed, moderate (H) F31.32    Borderline personality disorder (H) F60.3    Morbid obesity (H) E66.01    Unspecified mood (affective) disorder (H24) F39    Chronic constipation K59.09    History of suicide attempt Z91.51    Hyperhidrosis R61    Major depressive disorder, recurrent, in full remission (H24) F33.42    Vitamin D deficiency E55.9       Primary Problem This Admission  Active Hospital Problems    *Borderline personality disorder (H)    F60.3    Clinical Summary and Substantiation of Recommendations   After therapeutic assessment, intervention and aftercare planning by ED care team and LMHP and in consultation with attending provider, the patient's circumstances and mental state were appropriate for outpatient management. It is the recommendation of this clinician that pt discharge with OP  support. A this time the pt is not presenting as an acute risk to self or others due to the following factors: Pt presents for health concern and suicide ideation. Pt denies having any plans, means, or intent to harm herself or others. Pt denies SI/SIB/SA/HI/hallucinations/delusions. Pt has chronic SI and reports several skills and supports she can use. Pt feels safe to return home. Pt has significant outpatient involvement and lives in a group home with 24/7 staff supervision.                          Patient coping skills attempted to reduce the crisis:  Talked to  staff    Disposition  Recommended disposition: Individual Therapy, Group Home, Medication Management, Programmatic Care        Reviewed case and  recommendations with attending provider. Attending Name: Dr. Cornelius       Attending concurs with disposition: yes       Patient and/or validated legal guardian concurs with disposition:   yes       Final disposition:  discharge    Legal status on admission:      Assessment Details   Total duration spent with the patient: 10 min     CPT code(s) utilized: Non-Billable    MEL Robbins, Psychotherapist  [facility] - Triage & Transition Services  Callback: [phone]

## 2024-05-22 NOTE — ED NOTES
ASSESSMENT:  68M w/ PMH of HLD, HTN, MI, DM, CAD s/p CABG, previous DVT/PE on Xarelto presents for hypotensionand kidney stones presents to ED with complaints of abdominal pain in the bilateral flanks radiating across the lower abdomen as well as hypertension. Initial vitals noted to have a systolic of 239. A CT showed gallstones and enlarged gallbladder with a stone at the neck.   An US of RUQ was ordered to further delineate if pain is due to biliary pathology. US shows a right renal calculi measuring 1.2 cm and gallbladder edema. HIDA scan is suggestive of cholecystitis.     PLAN:   - Hypertensive urgency resolving, however patient still has extensive PMH including CABG, MI, massive P.E. previously on A.C., poor surgical candidate   - Trial of IV abx for nonoperative management of acute cholecystitis  - NPO, IVF  - Strict I/O  - If clinical status continue to worsen, consider IR consult for percutaneous cholecystostomy  - Continue BP control  - DVT ppx  - Surgery following    Above plan discussed with Attending Surgeon Dr. Crouch.  [facility] S transport here to transport pt. Report given. Questions asked and answered.    Pt calm and cooperative. Pt did get on S stretcher without any issues. Security alerted to bring the pt's belongings to her.

## 2024-05-22 NOTE — ED PROVIDER NOTES
EMERGENCY DEPARTMENT ENCOUNTER      NAME: Sadaf Ross  AGE: 23 year old female  YOB: 2000  MRN: [id number]  EVALUATION DATE & TIME: 5/21/2024 10:23 PM    PCP: [facility]    ED PROVIDER: Otilio Cornelius D.O.      Chief Complaint   Patient presents with    Suicidal       FINAL IMPRESSION:  1. Suicidal thoughts        ED COURSE & MEDICAL DECISION MAKING:    10:31 PM I met with the patient to gather history and to perform my initial exam. I discussed the plan for care while in the Emergency Department.  11:15 PM spoke with DEC for evaluation of the patient.         Pertinent Labs & Imaging studies reviewed. (See chart for details)  23 year old female presents to the Emergency Department for evaluation of suicidal thoughts.  Patient did report a plan initially, but then when speaking to DEC she reported that she had no intent, and no true plan.  She is not feeling suicidal at this time is requesting that we could transfer her back to her group home.  Patient does this frequently, and this time I do not believe her to be a risk for self or others, believe she can be discharged back to the care of her group home.  Patient otherwise stable at this time and can follow-up as an outpatient with her primary care provider and primary psychiatrist.  Return precautions were discussed.     Medical Decision Making  Obtained supplemental history:Supplemental history obtained?: No  Reviewed external records: External records reviewed?: No  Care impacted by chronic illness:Mental Health  Care significantly affected by social determinants of health:N/A  Did you consider but not order tests?: Work up considered but not performed and documented in chart, if applicable  Did you interpret images independently?: Independent interpretation of ECG and images noted in documentation, when applicable.  Consultation discussion with other provider:Did you involve another provider (consultant, , pharmacy, etc.)?: I discussed  the care with another health care provider, see documentation for details.  Discharge. No recommendations on prescription strength medication(s). See documentation for any additional details.    At the conclusion of the encounter I discussed the results of all of the tests and the disposition. The questions were answered. The patient or family acknowledged understanding and was agreeable with the care plan.      HPI    Patient information was obtained from: patient     Use of : N/A        Sadaf Ross is a 23 year old female who presents for suicidal.     The patient reports that she is suicidal with plans to run through traffic. She denies stressors or anything onset her feeling this ay. Patient reports feeling a little anxious, and that aromatherapy has been helping with this.     Per Chart Review: patient has three emergency department visits within the past nine days for suicidal ideation.       REVIEW OF SYSTEMS  Constitutional:  Denies fever, chills, weight loss or weakness  Eyes:  No pain, discharge, redness  HENT:  Denies sore throat, ear pain, congestion  Respiratory: No SOB, wheeze or cough  Cardiovascular:  No CP, palpitations  GI:  Denies abdominal pain, nausea, vomiting, diarrhea  : Denies dysuria, hematuria  Musculoskeletal:  Denies any new muscle/joint pain, swelling or loss of function.  Skin:  Denies rash, pallor  Neurologic:  Denies headache, focal weakness or sensory changes  Lymph: Denies swollen nodes    All other systems negative unless noted in HPI.    PAST MEDICAL HISTORY:  Past Medical History:   Diagnosis Date    ADHD (attention deficit hyperactivity disorder)     Bipolar 1 disorder (H)     Borderline personality disorder (H)     Depressive disorder     Intellectual disability     Obesity     Syncope        PAST SURGICAL HISTORY:  Past Surgical History:   Procedure Laterality Date    APPENDECTOMY           CURRENT MEDICATIONS:    No current facility-administered  medications for this encounter.     Current Outpatient Medications   Medication Sig Dispense Refill    acetaminophen (TYLENOL) 325 MG tablet Take 650 mg by mouth every 6 hours as needed for mild pain      ARIPiprazole lauroxil ER (ARISTADA) 882 MG/3.2ML intra-muscular Inject 882 mg into the muscle every 28 days On the 22nd of each month      bisacodyl (DULCOLAX) 5 MG EC tablet Take 1 tablet (5 mg) by mouth daily as needed for constipation 30 tablet 0    cloNIDine (CATAPRES) 0.1 MG tablet Take 1 tablet by mouth daily      dicyclomine (BENTYL) 20 MG tablet Take 20 mg by mouth 2 times daily as needed (dyspepsia)      divalproex sodium extended-release (DEPAKOTE ER) 250 MG 24 hr tablet Take 500 mg by mouth daily      docusate sodium (COLACE) 50 MG capsule Take 100 mg by mouth 2 times daily      famotidine (PEPCID) 20 MG tablet Take 20 mg by mouth daily      FLUoxetine (PROZAC) 40 MG capsule Take 80 mg by mouth daily      hydrOXYzine (ATARAX) 50 MG tablet Take 50 mg by mouth 2 times daily as needed for anxiety      loperamide (IMODIUM A-D) 2 MG tablet Take 2 tablets (4 mg) in the morning.  Take 1 tablet (2 mg) with every loose stool.  Do not exceed 8 tablets in a day. 30 tablet 0    OLANZapine zydis (ZYPREXA) 5 MG ODT Take 1 tablet (5 mg) by mouth At Bedtime 30 tablet 0    ondansetron (ZOFRAN) 4 MG tablet Take 1 tablet (4 mg) by mouth every 8 hours as needed 15 tablet 0    pantoprazole (PROTONIX) 40 MG EC tablet Take 40 mg by mouth daily      promethazine (PHENERGAN) 12.5 MG tablet Take 12.5 mg by mouth every 4 hours      senna-docusate (SENOKOT-S/PERICOLACE) 8.6-50 MG tablet Take 1 tablet by mouth 2 times daily      traZODone (DESYREL) 50 MG tablet Take 100 mg by mouth At Bedtime      Vitamin D, Cholecalciferol, 25 MCG (1000 UT) TABS Take 1,000 Units by mouth daily            ALLERGIES:  Allergies   Allergen Reactions    Penicillin G     Penicillins Rash and Unknown       FAMILY HISTORY:  Family History   Problem  "Relation Age of Onset    Diabetes Type 1 Father     Cancer Paternal Grandfather        SOCIAL HISTORY:  Social History     Socioeconomic History    Marital status: Single   Tobacco Use    Smoking status: Some Days     Current packs/day: 0.25     Average packs/day: 0.3 packs/day for 5.0 years (1.3 ttl pk-yrs)     Types: Cigarettes, Vaping Device    Smokeless tobacco: Former   Substance and Sexual Activity    Alcohol use: No    Drug use: No    Sexual activity: Not Currently     Partners: Female, Male     Birth control/protection: Injection     Social Determinants of Health     Financial Resource Strain: At Risk (2021)    Received from [facility]     Financial Resource Strain     Financial Resource Strain: 2   Food Insecurity: At Risk (2021)    Received from [facility]     Food Insecurity     Food: 2   Transportation Needs: At Risk (2021)    Received from [facility]     Transportation Needs     Transportation: 2   Physical Activity: Not on File (2020)    Received from [facility]     Physical Activity     Physical Activity: 0   Stress: Not at Risk (2021)    Received from [facility]     Stress     Stress: 1   Social Connections: Not at Risk (2021)    Received from [facility]     Social Connections     Social Connections and Isolation: 1   Interpersonal Safety: Not At Risk (2024)    [facility]     Humiliation, Afraid, Rape, and Kick questionnaire     Fear of Current or Ex-Partner: No     Emotionally Abused: No     Physically Abused: No     Sexually Abused: No   Housing Stability: Not at Risk (2021)    Received from [facility]     Housing Stability     Housin       VITALS:  Patient Vitals for the past 24 hrs:   BP Temp Temp src Pulse Resp SpO2 Height Weight   242 101/55 98.1  F (36.7  C) Oral 91 16 97 % -- --   24 -- -- -- -- -- -- 1.6 m (5' 3\") 109.8 kg (242 lb)       PHYSICAL EXAM    VITAL SIGNS: /55   " "Pulse 91   Temp 98.1  F (36.7  C) (Oral)   Resp 16   Ht 1.6 m (5' 3\")   Wt 109.8 kg (242 lb)   LMP  (LMP Unknown)   SpO2 97%   BMI 42.87 kg/m      General Appearance: Well-appearing, well-nourished, no acute distress   Head:  Normocephalic, without obvious abnormality, atraumatic  Eyes:  PERRL, conjunctiva/corneas clear, EOM's intact,  ENT:  Lips, mucosa, and tongue normal, membranes are moist without pallor  Neck:  Normal ROM, symmetrical, trachea midline    Cardio:  Regular rate and rhythm, no murmur, rub or gallop, 2+ pulses symmetric in all extremities  Pulm:  Clear to auscultation bilaterally, respirations unlabored,  Abdomen:  Soft, non-tender, no rebound or guarding.  Musculoskeletal: Full ROM, no edema, no cyanosis, good ROM of major joints  Integument:  Warm, Dry, No erythema, No rash.    Neurologic:  Alert & oriented.  No focal deficits appreciated.  Ambulatory.  Psychiatric:  Affect normal, Judgment normal, Mood normal. Positive for suicidal ideation.    LABS  No results found for this or any previous visit (from the past 24 hour(s)).      RADIOLOGY  No orders to display            MEDICATIONS GIVEN IN THE EMERGENCY:  Medications - No data to display    NEW PRESCRIPTIONS STARTED AT TODAY'S ER VISIT  New Prescriptions    No medications on file        I, Christi Escobar, am serving as a scribe to document services personally performed by Otilio Cornelius D.O., based on my observations and the provider's statements to me.  I, Otilio Cornelius D.O., attest that Christi Escobar is acting in a scribe capacity, has observed my performance of the services and has documented them in accordance with my direction.     Otilio Cornelius D.O.  Emergency Medicine  [facility] EMERGENCY DEPARTMENT  [address] [id number] [phone]  Dept: [phone]       Otilio Cornelius, DO  05/22/24 0009       Otilio Cornelius, DO  05/22/24 0011    "

## 2024-05-22 NOTE — ED NOTES
Transportation called to [facility]. Spoke with Giancarlo. Due to what pt is being seen for mental health, recommendation for BLS transport for supervision versus WC. BLS transport ETA 45 mins.

## 2024-05-22 NOTE — DISCHARGE INSTRUCTIONS
Aftercare Plan  If I am feeling unsafe or I am in a crisis, I will:   Contact my established care providers   Call the National Suicide Prevention Lifeline: 596.971.1512   Go to the nearest emergency room   Call 911     Warning signs that I or other people might notice when a crisis is developing for me:     I am having increasing suicidal thoughts that turn to plans with intent or means  I am having additional urges to self-harm    My emotions are of hopelessness; feeling like there's no way out.  Rage or anger.  Engaging in risky activities without thinking  Withdrawing from family/friends  Dramatic mood swings  Drastic personality changes   Use of alcohol or drugs  Postings on social media  Neglect of personal hygiene or cares     Things I am able to do on my own to cope or help me feel better:    Spending quality time with loved ones  Staying hydrated  Eating balanced meals  Going for a walk every day  Take care of daily responsibilities/needs  Focus on positive self-talk vs negative self-talk    Things that I am able to do with others to cope or help me better:   Music  Deep breathing  Meditations  Journal  Self-regulate  Self check-in  Ask for help  Exercise    Things I can use or do for distraction:   Reach out to/spend time with family, friends  Shower  Exercise  Chores or do a project  Listen to music  Watch movie/TV  Listening to music  Journaling  Reading a book  Meditating  Call a friend    Changes I can make to support my mental health and wellness:    -I will abstain from all mood altering chemicals not currently prescribed to me   -I will attend scheduled mental health therapy and psychiatric appointments and follow all   recommendations  -I will commit to 30 minutes of self care daily - this can be as simple as taking a shower, going for a walk, cooking a meal, read, writing, etc  -I will practice square breathing when I begin to feel anxious - in breath through the nose for the count   of 4 and the  first line on the square. Out breath through the mouth for the count of 4 for the second line   of the square. Repeat to complete the square. Repeat the square as many times as needed.  - I will use distraction skills of: going for walks, watching TV, spending time outside, calling a friend or family member  -Use community resources, including hotline numbers, [facility] crisis and support meetings  -Maintain a daily schedule/routine  -Practice deep breathing skills  -Download a meditation kervin and spend 15-20 minutes per day mediating/relaxing. Some apps to   download include: Calm, Headspace and Insight Timer. All 3 of these apps have free version    Reduce Extreme Emotion  QUICKLY:  Changing Your Body Chemistry      T:  Change your body Temperature to change your autonomic nervous system   Use Ice Water to calm yourself down FAST   Put your face in a bowl of ice water (this is the best way; have the person keep his/her face in ice water for 30-45 seconds - initial research is showing that the longer s/he can hold her/his face in the water, the better the response), or   Splash ice water on your face, or hold an ice pack on your face      I:  Intensely exercise to calm down a body revved up by emotion   Examples: running, walking fast, jumping, playing basketball, weight lifting, swimming, calisthenics, etc.   Engage in exercises that DO NOT include violent behaviors. Exercises that utilize violent behaviors tend to function as  behavioral rehearsal,  and rather than calming the person down, may actually  rev  the person up more, increasing the likelihood of violence, and lessening the likelihood that they will  burn off  energy     P:  Progressively relax your muscles   Starting with your hands, moving to your forearms, upper arms, shoulders, neck, forehead, eyes, cheeks and lips, tongue and teeth, chest, upper back, stomach, buttocks, thighs, calves, ankles, feet   Tense (10 seconds,   of the way), then relax each  muscle (all the way)   Notice the tension   Notice the difference when relaxed (by tensing first, and then relaxing, you are able to get a more thorough relaxation than by simply relaxing)      P: Paced breathing to relax   The standard technique is to begin with counting the number of steps one takes for a typical inhale, then counting the steps one takes for a typical exhale, and then lengthening the amount of steps for the exhalation by one or two steps.  OR  Repeat this pattern for 1-2 minutes  Inhale for four (4) seconds   Exhale for six (6) to eight (8) seconds   Research demonstrated that one can change one's overall level of anxiety by doing this exercise for even a few minutes per day      People in my life that I can ask for help:   Family  Friends  Providers    Your [facility] has a mental health crisis team you can call 24/7:   [facility] Crisis Line Number: [phone]  [facility] Mental Health Crisis: [phone] - Call the crisis line for immediate mental health support, 24 hours a day.   [facility] Crisis Line Number: [phone]  [facility] Crisis Line Number: [phone]  [facility] Crisis Line Number: [phone]   [facility] Crisis Line Number: 469-834-1794  [place]: [phone]  [place]: [phone]  [facility] Crisis Number: 5-460-037-6682  [facility] Crisis: [phone]  [facility] Crisis: [phone]  [facility] Crisis: [phone]  [facility] Crisis: [phone]    Other things that are important when I'm in crisis:   Ask for help    Additional resources and information:     Mental Health Apps  My3  https://mySprout Socialpp.org/    VirtualHopeBox  https://[email or web address]/apps/virtual-hope-box/       Professionals or Agencies I Can Contact During A Crisis:       Crisis Lines  Call or Text 988 - National Suicide and Crisis Lifeline    Crisis Text Line  Text 105805  You will be connected with  "a trained live crisis counselor to provide support.    The Mikey Project (LGBTQ Youth Crisis Line)  1.229.123.5921  text START to 651-545    National [place] on Mental Illness (MAYG)  384.732.7723 or 7.869.MAGY.HELPS    National Suicide Prevention Lifeline at 6-857-595-ESTJ (5682)     Throughout  [place]: call **CRISIS (**638266)     Crisis Text Line: is available for free, 24/7 by texting [place] to 834886    [employer]  Fast Tracker  Linking people to mental health and substance use disorder resources  [employer]     [place] Mental Health Warm Line  Peer to peer support  Monday thru Saturday, 12 pm to 10 pm  597.151.3673 or 1.300.139.6296  Text \"Support\" to 39217     National [place] on Mental Illness (www.[place].magy.org): 378.824.6842 or 906-651-6594     [facility] Phone (free remote counseling): 470.980.2892 Web address:   https://Healthy Humans.org/     www.fashionandyou.com (filter for insurance, gender preference, etc.)    [facility]   [phone]  Intake appointment will be virtual, following appointments can be in person or virtual.   **IMMEDIATE OPENINGS**    [facility]  [phone]  *offers individual therapy, medication management and Mental Health Case Workers; can self refer    [facility]  [phone]  *Immediate Openings    [facility]  [phone]  *Immediate Openings    [facility]  [phone]  *Immediate Openings    Please follow up with scheduled providers to ensure all necessary paperwork is filled out prior to your   scheduled telehealth appointments.     Coordinators from [facility] will be calling within two business days to ensure   that you have the resources you may need or provide assistance with scheduling (Phone number: [phone].).    Remember: give the referrals 3 sessions prior to calling it quits. Do you trust them? Do you feel   understood? " Do you think they can help? Check in with yourself after each session    Please reach out to the [facility]([phone]) regarding further mental health appointment needs for this emergency department visit.    [facility] SCHEDULING:  Today you were seen by a licensed mental health professional through [facility], [facility] ([facility])  for a crisis assessment in the Emergency Department at [facility].  It is recommended that you follow up with your estabished providers (psychiatrist, menta health therapist, and/or primary care doctor - as relevant) as soon as possible. Coordinators from [facility] will be calling you in the next 24-48 hours to ensure that you have the resources you need.  You can so contact [facility] coordinators directly at [phone].     [facility] maintains an extensive network of licensed behavioral health providers to connect patients with the services they need.  We do not charge providers a fee to participate in our referral network.  We match patients with providers based on a patient s specific needs, insurance coverage, and location.  Our first effort will be to refer you to a provider within your care system, and will utilize providers outside your care system as needed.

## 2024-05-22 NOTE — ED TRIAGE NOTES
Patient reports that she was going to come on a taxi but it she could not pay so the taxi get out and call a an ambulance. Patient reports that she is suicidal with the intent to walk into traffic, but didn't because it is raining so hard tonight.     Triage Assessment (Adult)       Row Name 05/21/24 0424          Triage Assessment    Airway WDL WDL        Respiratory WDL    Respiratory WDL WDL        Skin Circulation/Temperature WDL    Skin Circulation/Temperature WDL WDL        Cardiac WDL    Cardiac WDL WDL        Peripheral/Neurovascular WDL    Peripheral Neurovascular WDL WDL        Cognitive/Neuro/Behavioral WDL    Cognitive/Neuro/Behavioral WDL WDL

## 2024-05-22 NOTE — ED NOTES
Report called to [facility]  at [phone]. Questions asked and answered. Staff will be present when the pt arrives. No keys needed.

## 2024-05-23 ENCOUNTER — HOSPITAL ENCOUNTER (EMERGENCY)
Facility: HOSPITAL | Age: 24
Discharge: HOME | End: 2024-05-23
Attending: EMERGENCY MEDICINE | Admitting: EMERGENCY MEDICINE
Payer: MEDICARE

## 2024-05-23 ENCOUNTER — HOSPITAL ENCOUNTER (EMERGENCY)
Facility: HOSPITAL | Age: 24
Discharge: HOME | End: 2024-05-23
Attending: STUDENT IN AN ORGANIZED HEALTH CARE EDUCATION/TRAINING PROGRAM
Payer: MEDICARE

## 2024-05-23 VITALS
HEART RATE: 90 BPM | TEMPERATURE: 97.9 F | SYSTOLIC BLOOD PRESSURE: 131 MMHG | RESPIRATION RATE: 18 BRPM | DIASTOLIC BLOOD PRESSURE: 76 MMHG | WEIGHT: 249.7 LBS | BODY MASS INDEX: 44.24 KG/M2 | OXYGEN SATURATION: 100 % | HEIGHT: 63 IN

## 2024-05-23 VITALS
OXYGEN SATURATION: 100 % | HEART RATE: 78 BPM | RESPIRATION RATE: 14 BRPM | SYSTOLIC BLOOD PRESSURE: 135 MMHG | TEMPERATURE: 98.9 F | DIASTOLIC BLOOD PRESSURE: 63 MMHG

## 2024-05-23 DIAGNOSIS — R07.9 CHEST PAIN, UNSPECIFIED TYPE: ICD-10-CM

## 2024-05-23 DIAGNOSIS — R42 DIZZINESS: ICD-10-CM

## 2024-05-23 DIAGNOSIS — F41.1 GENERALIZED ANXIETY DISORDER: ICD-10-CM

## 2024-05-23 PROCEDURE — 93005 ELECTROCARDIOGRAM TRACING: CPT | Performed by: STUDENT IN AN ORGANIZED HEALTH CARE EDUCATION/TRAINING PROGRAM

## 2024-05-23 PROCEDURE — 99283 EMERGENCY DEPT VISIT LOW MDM: CPT

## 2024-05-23 PROCEDURE — 99283 EMERGENCY DEPT VISIT LOW MDM: CPT | Mod: 25,27

## 2024-05-23 ASSESSMENT — COLUMBIA-SUICIDE SEVERITY RATING SCALE - C-SSRS
6. HAVE YOU EVER DONE ANYTHING, STARTED TO DO ANYTHING, OR PREPARED TO DO ANYTHING TO END YOUR LIFE?: NO
6. HAVE YOU EVER DONE ANYTHING, STARTED TO DO ANYTHING, OR PREPARED TO DO ANYTHING TO END YOUR LIFE?: YES
1. IN THE PAST MONTH, HAVE YOU WISHED YOU WERE DEAD OR WISHED YOU COULD GO TO SLEEP AND NOT WAKE UP?: NO
2. HAVE YOU ACTUALLY HAD ANY THOUGHTS OF KILLING YOURSELF IN THE PAST MONTH?: YES
1. IN THE PAST MONTH, HAVE YOU WISHED YOU WERE DEAD OR WISHED YOU COULD GO TO SLEEP AND NOT WAKE UP?: YES
2. HAVE YOU ACTUALLY HAD ANY THOUGHTS OF KILLING YOURSELF IN THE PAST MONTH?: NO

## 2024-05-23 ASSESSMENT — ACTIVITIES OF DAILY LIVING (ADL): ADLS_ACUITY_SCORE: 37

## 2024-05-23 NOTE — ED PROVIDER NOTES
EMERGENCY DEPARTMENT ENCOUNTER      NAME: Sadaf Ross  AGE: 23 year old female  YOB: 2000  MRN: [id number]  EVALUATION DATE & TIME: No admission date for patient encounter.    PCP: Nancy Downing    ED PROVIDER: Pacheco Castañeda M.D.      Chief Complaint   Patient presents with    Chest Pain    Cough    Shortness of Breath         FINAL IMPRESSION:  1.  Viral URI.      ED COURSE & MEDICAL DECISION MAKIN:20 PM.  I met with the patient to gather history and to perform my initial exam. We discussed plans for the ED course, including diagnostic testing and treatment. PPE worn: cloth mask.  Patient with panic attack this morning.  Patient also complaining of bilateral chest pain when she coughs.  She notes a cough with whitish phlegm.  No fever or other complaints.  Multiple ER visits and ER care plan reviewed.  9:38 PM.  EKG unremarkable.  Chest x-ray negative.  Troponin negative.  D-dimer negative.  Findings consistent with a viral URI.  Patient discharged back to group plan.  Patient in agreement.    Pertinent Labs & Imaging studies reviewed. (See chart for details)  23 year old female presents to the Emergency Department for evaluation of chest pain when coughing.    At the conclusion of the encounter I discussed the results of all of the tests and the disposition. The questions were answered. The patient or family acknowledged understanding and was agreeable with the care plan.              Medical Decision Making  Obtained supplemental history: EMS.  Reviewed external records: Both inpatient and outpatient computer records were reviewed.  The ER care plan reviewed.  Patient has this because of frequent ER visitation (23 ER visits and 8 office visits in the month of May).  Care impacted by chronic illness: Borderline intellectual functioning, borderline personality disorder, depression, anxiety and panic attacks, etc.  Care significantly affected by social determinants of health:Access to  Medical Care  Did you consider but not order tests?: Work up considered but not performed and documented in chart, if applicable  Did you interpret images independently?: Independent interpretation of ECG and images noted in documentation, when applicable.  Consultation discussion with other provider:Did you involve another provider (consultant, , pharmacy, etc.)?: No  Pending negative evaluation, discharge to home.      MEDICATIONS GIVEN IN THE EMERGENCY:  Medications - No data to display    NEW PRESCRIPTIONS STARTED AT TODAY'S ER VISIT  New Prescriptions    No medications on file          =================================================================    HPI    Patient information was obtained from: EMS.    Use of : N/A         Sadaf Ross is a 23 year old female with a pertinent history of chronic psychiatric issues who presents to this ED today for evaluation of panic attack this morning.  Also complaining of bilateral chest pain when she coughs.  She notes cough productive of whitish phlegm.  No fever.    She does not identify any waxing or waning symptoms otherwise, exacerbating or alleviating features, associated symptoms except as mentioned.  She otherwise denies any pain related complaints.    REVIEW OF SYSTEMS   Review of Systems patient complaining of panic attack and coughing.    PAST MEDICAL HISTORY:  Past Medical History:   Diagnosis Date    ADHD (attention deficit hyperactivity disorder)     Bipolar 1 disorder (H)     Borderline personality disorder (H)     Depressive disorder     Intellectual disability     Obesity     Syncope        PAST SURGICAL HISTORY:  Past Surgical History:   Procedure Laterality Date    APPENDECTOMY             CURRENT MEDICATIONS:    acetaminophen (TYLENOL) 325 MG tablet  ARIPiprazole lauroxil ER (ARISTADA) 882 MG/3.2ML intra-muscular  bisacodyl (DULCOLAX) 5 MG EC tablet  cloNIDine (CATAPRES) 0.1 MG tablet  dicyclomine (BENTYL) 20 MG tablet  divalproex  sodium extended-release (DEPAKOTE ER) 250 MG 24 hr tablet  docusate sodium (COLACE) 50 MG capsule  famotidine (PEPCID) 20 MG tablet  FLUoxetine (PROZAC) 40 MG capsule  hydrOXYzine (ATARAX) 50 MG tablet  loperamide (IMODIUM A-D) 2 MG tablet  OLANZapine zydis (ZYPREXA) 5 MG ODT  ondansetron (ZOFRAN) 4 MG tablet  pantoprazole (PROTONIX) 40 MG EC tablet  promethazine (PHENERGAN) 12.5 MG tablet  senna-docusate (SENOKOT-S/PERICOLACE) 8.6-50 MG tablet  traZODone (DESYREL) 50 MG tablet  Vitamin D, Cholecalciferol, 25 MCG (1000 UT) TABS        ALLERGIES:  Allergies   Allergen Reactions    Penicillin G     Penicillins Rash and Unknown       FAMILY HISTORY:  Family History   Problem Relation Age of Onset    Diabetes Type 1 Father     Cancer Paternal Grandfather        SOCIAL HISTORY:   Social History     Socioeconomic History    Marital status: Single   Tobacco Use    Smoking status: Some Days     Current packs/day: 0.25     Average packs/day: 0.3 packs/day for 5.0 years (1.3 ttl pk-yrs)     Types: Cigarettes, Vaping Device    Smokeless tobacco: Former   Substance and Sexual Activity    Alcohol use: No    Drug use: No    Sexual activity: Not Currently     Partners: Female, Male     Birth control/protection: Injection     Social Determinants of Health     Financial Resource Strain: At Risk (1/20/2021)    Received from [facility]     Financial Resource Strain     Financial Resource Strain: 2   Food Insecurity: At Risk (1/20/2021)    Received from [facility]     Food Insecurity     Food: 2   Transportation Needs: At Risk (1/20/2021)    Received from [facility]     Transportation Needs     Transportation: 2   Physical Activity: Not on File (7/17/2020)    Received from [facility]     Physical Activity     Physical Activity: 0   Stress: Not at Risk (1/20/2021)    Received from [facility]     Stress     Stress: 1   Social Connections: Not at Risk (1/20/2021)    Received from [facility]     Social Connections      "Social Connections and Isolation: 1   Interpersonal Safety: Not At Risk (2024)    Received from [facility]     Humiliation, Afraid, Rape, and Kick questionnaire     Fear of Current or Ex-Partner: No     Emotionally Abused: No     Physically Abused: No     Sexually Abused: No   Housing Stability: Not at Risk (2021)    Received from [facility]     Housing Stability     Housin     Patient notes she smokes daily.  We talked about quitting.    VITALS:  /61   Pulse 92   Temp 98.7  F (37.1  C) (Oral)   Resp 18   Ht 1.6 m (5' 3\")   Wt 113.4 kg (250 lb)   LMP  (LMP Unknown)   SpO2 99%   BMI 44.29 kg/m      PHYSICAL EXAM    Vital Signs:  /61   Pulse 92   Temp 98.7  F (37.1  C) (Oral)   Resp 18   Ht 1.6 m (5' 3\")   Wt 113.4 kg (250 lb)   LMP  (LMP Unknown)   SpO2 99%   BMI 44.29 kg/m    General:  On entering the room she is in no apparent distress.    Neck:  Neck supple with full range of motion and nontender.    Back:  Back and spine are nontender.  No costovertebral angle tenderness.    HEENT:  Oropharynx clear with moist mucous membranes.  HEENT unremarkable.    Pulmonary:  Chest clear to auscultation without rhonchi rales or wheezing.  No pain with palpation, inspiration, or movement.  Cardiovascular:  Cardiac regular rate and rhythm without murmurs rubs or gallops.    Abdomen:  Abdomen soft nontender.  There is no rebound or guarding.    Muskuloskeletal:  She moves all 4 without any difficulty and has normal neurovascular exams.  Extremities without clubbing, cyanosis, or edema.  Legs and calves are nontender.    Neuro:  She is alert and oriented ×3 and moves all extremities symmetrically.    Psych:  Normal affect.  Complaining about panic attack earlier.  Skin:  Unremarkable and warm and dry.       LAB:  All pertinent labs reviewed and interpreted.  Labs Ordered and Resulted from Time of ED Arrival to Time of ED Departure   D DIMER QUANTITATIVE - Normal       Result " Value    D-Dimer Quantitative <0.27     TROPONIN T, HIGH SENSITIVITY - Normal    Troponin T, High Sensitivity <6         RADIOLOGY:  Reviewed all pertinent imaging. Please see official radiology report.  XR Chest 2 Views   Final Result   IMPRESSION: Heart is normal in size. Lungs are clear.                 EKG:    Normal sinus rhythm at 73, possible old anterior wall MI but more likely at her age of poor R wave progression.  Otherwise no acute findings.  Similar to previous EKG of May 9, 2024.    I have independently reviewed and interpreted the EKG(s) documented above.        Pacheco Castañeda M.D.  Emergency Medicine  [facility] EMERGENCY DEPARTMENT  [address] [place] [id number]  [phone]  Dept: [phone]       Pacheco Castañeda MD  05/22/24 1523

## 2024-05-23 NOTE — DISCHARGE INSTRUCTIONS
Your workup in the emergency department today is reassuring.  Unclear etiology of your chest pain but I do not believe it is a dangerous cause.  If pain recurs in the future you may take Tylenol or try your inhaler to see if this helps.  If pain is persistent or worsening or you have other associated symptoms such as shortness of breath, vomiting or other concerning symptoms please return to the emergency department

## 2024-05-23 NOTE — ED TRIAGE NOTES
Pt BIBA by wheelchair to triage with c/o bilateral chest pain that radiates to her back when she coughs.  Endorses sob.  Pt states she woke up this morning at 0500 having a panic attack due to a court date coming up.  Pt took ativan and hydroxyzine.  Pt recently discharged from here last night 0100.      Triage Assessment (Adult)       Row Name 05/22/24 1945          Triage Assessment    Airway WDL WDL        Respiratory WDL    Respiratory WDL X;cough;all     Rhythm/Pattern, Respiratory shortness of breath     Cough Frequency frequent        Skin Circulation/Temperature WDL    Skin Circulation/Temperature WDL WDL        Cardiac WDL    Cardiac WDL X;chest pain  when coughing        Chest Pain Assessment    Chest Pain Location anterior chest, left;anterior chest, right     Chest Pain Radiation back     Character sharp     Duration 0500 when patient stated she had a panick attack     Precipitating Factors unknown     Alleviating Factors nothing     Associated Signs/Symptoms dyspnea     Chest Pain Intervention 12-lead ECG obtained        Peripheral/Neurovascular WDL    Peripheral Neurovascular WDL WDL        Cognitive/Neuro/Behavioral WDL    Cognitive/Neuro/Behavioral WDL WDL

## 2024-05-23 NOTE — ED NOTES
Patient has care plan in place per care team and group home. Has extensive history of ED visits and disruptive, sometimes violent, behavior.  Will ensure provider aware. Patient on phone laughing and chatting with friends. No signs of distress.

## 2024-05-23 NOTE — ED TRIAGE NOTES
C/o dizziness that started this afternoon. Pt states she has had this before with anxiety.      Triage Assessment (Adult)       Row Name 05/23/24 8794          Triage Assessment    Airway WDL WDL        Respiratory WDL    Respiratory WDL WDL        Skin Circulation/Temperature WDL    Skin Circulation/Temperature WDL WDL        Peripheral/Neurovascular WDL    Peripheral Neurovascular WDL WDL        Cognitive/Neuro/Behavioral WDL    Cognitive/Neuro/Behavioral WDL WDL

## 2024-05-23 NOTE — ED TRIAGE NOTES
Pt admits to chronic suicidal thoughts for over 3 years in response to questions. No new changes or plan in place.      Triage Assessment (Adult)       Row Name 05/23/24 1824          Triage Assessment    Airway WDL WDL        Respiratory WDL    Respiratory WDL WDL        Skin Circulation/Temperature WDL    Skin Circulation/Temperature WDL WDL        Peripheral/Neurovascular WDL    Peripheral Neurovascular WDL WDL        Cognitive/Neuro/Behavioral WDL    Cognitive/Neuro/Behavioral WDL WDL

## 2024-05-23 NOTE — ED PROVIDER NOTES
EMERGENCY DEPARTMENT ENCOUNTER      NAME: Sadaf Ross  AGE: 23 year old female  YOB: 2000  MRN: [id number]  EVALUATION DATE & TIME: No admission date for patient encounter.    PCP: [place], Nancy    ED PROVIDER: Sandeep Sheth MD      Chief Complaint   Patient presents with    Chest Pain    Shortness of Breath         FINAL IMPRESSION:  1. Chest pain, unspecified type          ED COURSE & MEDICAL DECISION MAKING:    Pertinent Labs & Imaging studies reviewed. (See chart for details)  23 year old female presents to the Emergency Department for evaluation of chest pain    ED Course as of 05/23/24 1554   Thu May 23, 2024   1515 Patient is a 23-year-old female with history of intellectual disability, borderline personality disorder and frequent Emergency Department visits who presents emergency department today for evaluation of chest pain and shortness of breath that started approxi-1 hour prior to arrival.  Was evaluated yesterday for similar complaint of cough chest pain shortness of breath.  Personally reviewed this visit and looks like troponin negative and chest x-ray also reassuring.  EKG was unremarkable at that time.    Patient states that pain started about an hour ago and has since resolved entirely.  Says it was in the middle of her chest.  No significant new cough, sore throat or runny nose.  States that she has an albuterol inhaler at home but did not use this.  No nausea or vomiting.       1554 On exam patient is well-appearing in no acute distress.  Hemodynamically stable afebrile saturating well on room air.  Lungs are clear without any wheezes or rales normal work of breathing.  Chest wall nontender.  Abdomen soft and benign.  No pharyngeal erythema or exudate.    She had a similar complaint yesterday with reassuring workup yesterday.   Negative cardiac biomarkers at that point in time given age and risk factors  think is unlikely that this is ACS particular with her symptoms entirely  resolved.  Additionally no focal findings on exam or fevers do not believe she benefit from a chest x-ray at this point in time.    Patient was reassured after evaluation and was discharged stable condition     3:21 PM I met and evaluated the patient.   3:23 PM Discussed discharge with the patient at this time, she is agreeable with this plan.    Medical Decision Making  Obtained supplemental history:Supplemental history obtained?: No  Reviewed external records: External records reviewed?: Documented in chart and Outpatient Record: Prior emergency department visits  Care impacted by chronic illness:Mental Health  Care significantly affected by social determinants of health:N/A  Did you consider but not order tests?: Work up considered but not performed and documented in chart, if applicable  Did you interpret images independently?: Independent interpretation of ECG and images noted in documentation, when applicable.  Consultation discussion with other provider:Did you involve another provider (consultant, , pharmacy, etc.)?: No  Discharge. No recommendations on prescription strength medication(s). See documentation for any additional details.          At the conclusion of the encounter I discussed the results of all of the tests and the disposition. The questions were answered. The patient or family acknowledged understanding and was agreeable with the care plan.     0 minutes of critical care time     MEDICATIONS GIVEN IN THE EMERGENCY:  Medications - No data to display    NEW PRESCRIPTIONS STARTED AT TODAY'S ER VISIT  Discharge Medication List as of 5/23/2024  3:30 PM             =================================================================    HPI    Patient information was obtained from: the patient    Use of : N/A       Sadaf Ross is a 23 year old female with a pertinent history of bipolar I disorder, seizure, and borderline personality disorder, who presents to this ED for evaluation of  chest pain and shortness of breath.     One hour ago, the patient experienced sudden onset of central chest pain that was rated a 9/10 in severity. Since then, the pain has completely resolved. She endorses associated shortness of breath at that time. She also notes chills over the past couple weeks. Patient has an inhaler, but has not been using it. The patient denies sore throat, fever, rhinorrhea, and any other symptoms at this time.    REVIEW OF SYSTEMS   Refer to the HPI    PAST MEDICAL HISTORY:  Past Medical History:   Diagnosis Date    ADHD (attention deficit hyperactivity disorder)     Bipolar 1 disorder (H)     Borderline personality disorder (H)     Depressive disorder     Intellectual disability     Obesity     Syncope        PAST SURGICAL HISTORY:  Past Surgical History:   Procedure Laterality Date    APPENDECTOMY             CURRENT MEDICATIONS:    acetaminophen (TYLENOL) 325 MG tablet  ARIPiprazole lauroxil ER (ARISTADA) 882 MG/3.2ML intra-muscular  bisacodyl (DULCOLAX) 5 MG EC tablet  cloNIDine (CATAPRES) 0.1 MG tablet  dicyclomine (BENTYL) 20 MG tablet  divalproex sodium extended-release (DEPAKOTE ER) 250 MG 24 hr tablet  docusate sodium (COLACE) 50 MG capsule  famotidine (PEPCID) 20 MG tablet  FLUoxetine (PROZAC) 40 MG capsule  hydrOXYzine (ATARAX) 50 MG tablet  loperamide (IMODIUM A-D) 2 MG tablet  OLANZapine zydis (ZYPREXA) 5 MG ODT  ondansetron (ZOFRAN) 4 MG tablet  pantoprazole (PROTONIX) 40 MG EC tablet  promethazine (PHENERGAN) 12.5 MG tablet  senna-docusate (SENOKOT-S/PERICOLACE) 8.6-50 MG tablet  traZODone (DESYREL) 50 MG tablet  Vitamin D, Cholecalciferol, 25 MCG (1000 UT) TABS        ALLERGIES:  Allergies   Allergen Reactions    Penicillin G     Penicillins Rash and Unknown       FAMILY HISTORY:  Family History   Problem Relation Age of Onset    Diabetes Type 1 Father     Cancer Paternal Grandfather        SOCIAL HISTORY:   Social History     Socioeconomic History    Marital status: Single  "  Tobacco Use    Smoking status: Some Days     Current packs/day: 0.25     Average packs/day: 0.3 packs/day for 5.0 years (1.3 ttl pk-yrs)     Types: Cigarettes, Vaping Device    Smokeless tobacco: Former   Substance and Sexual Activity    Alcohol use: No    Drug use: No    Sexual activity: Not Currently     Partners: Female, Male     Birth control/protection: Injection     Social Determinants of Health     Financial Resource Strain: At Risk (2021)    Received from [facility]     Financial Resource Strain     Financial Resource Strain: 2   Food Insecurity: At Risk (2021)    Received from [facility]     Food Insecurity     Food: 2   Transportation Needs: At Risk (2021)    Received from [facility]     Transportation Needs     Transportation: 2   Physical Activity: Not on File (2020)    Received from [facility]     Physical Activity     Physical Activity: 0   Stress: Not at Risk (2021)    Received from [facility]     Stress     Stress: 1   Social Connections: Not at Risk (2021)    Received from [facility]     Social Connections     Social Connections and Isolation: 1   Interpersonal Safety: Not At Risk (2024)    Received from [facility]     Humiliation, Afraid, Rape, and Kick questionnaire     Fear of Current or Ex-Partner: No     Emotionally Abused: No     Physically Abused: No     Sexually Abused: No   Housing Stability: Not at Risk (2021)    Received from [facility]     Housing Stability     Housin       VITALS:  /76   Pulse 90   Temp 97.9  F (36.6  C) (Oral)   Resp 18   Ht 1.6 m (5' 3\")   Wt 113.3 kg (249 lb 11.2 oz)   LMP  (LMP Unknown)   SpO2 100%   BMI 44.23 kg/m      PHYSICAL EXAM    Constitutional: Well developed, Well nourished, NAD,  HENT: Normocephalic, Atraumatic,  mucous membranes moist,   Neck- trachea midline, No stridor.   Eyes:EOMI, Conjunctiva normal, No discharge.   Respiratory: Normal breath sounds, No " respiratory distress, No wheezing.    Cardiovascular: Normal heart rate, Regular rhythm, No murmurs,   Abdominal: Soft, No tenderness, No rebound or guarding.     Musculoskeletal: no deformity or malalignment   Integument: Warm, Dry, No erythema,    Neurologic: Alert & oriented x 3   Psychiatric: Affect normal, Cooperative.      LAB:  All pertinent labs reviewed and interpreted.       RADIOLOGY:  Reviewed all pertinent imaging. Please see official radiology report.  No orders to display       EKG:        Impression: EKG shows a sinus rhythm at 70 beats minute, normal axis, normal IL, QRS and QTc durations, no ST elevations or acute ischemic T wave changes.  No significant changes when compared to prior        I have independently reviewed and interpreted the EKG(s) documented above.    PROCEDURES:         [facility] System Documentation:   CMS Diagnoses:              I Stephanie Chamberlain, am serving as a scribe to document services personally performed by Sandeep Sheth MD based on my observation and the provider's statements to me. I, Sandeep Sheth MD, attest that Stephanie Chamberlain is acting in a scribe capacity, has observed my performance of the services and has documented them in accordance with my direction.    Sandeep Sheth MD  [facility] EMERGENCY DEPARTMENT  [facility] [facility] [id number] [phone]        Sandeep Sheth MD  05/23/24 2557

## 2024-05-23 NOTE — ED NOTES
Patient has care plan but no contact information for group home at all in chart. This is illogical and problematic for ED staff to facilitate care and adhere to patient care plan.

## 2024-05-23 NOTE — ED TRIAGE NOTES
Patient presents to ER by self.  C/o generalized chest pain but feels mostly in the middle with shortness of breath that started approximately 1 hr PTA.      Also endorses feeling lightheaded and a headache.  Denies nausea, vomiting.

## 2024-05-24 NOTE — DISCHARGE INSTRUCTIONS
You refused medications and testing that were offered here.  Discharge to home.  Please make appointments to follow-up with your family doctor or clinic as needed.

## 2024-05-24 NOTE — ED PROVIDER NOTES
EMERGENCY DEPARTMENT ENCOUNTER      NAME: Sadaf Ross  AGE: 23 year old female  YOB: 2000  MRN: [id number]  EVALUATION DATE & TIME: 2024  6:40 PM    PCP: [facility]    ED PROVIDER: Pacheco Castañeda M.D.      Chief Complaint   Patient presents with    Dizziness    Anxiety         FINAL IMPRESSION:  1.  Subjective dizziness.  2.  Generalized anxiety disorder.      ED COURSE & MEDICAL DECISION MAKIN PM.  I met with the patient to gather history and to perform my initial exam. We discussed plans for the ED course, including diagnostic testing and treatment. PPE worn: cloth mask.  Patient with generalized anxiety disorder and multiple ER visits for various complaints.  This is her third visit in 24 hours with previous negative evaluation workups.  Complaining that she is dizzy.  No evidence for this is noted.  Patient alert and oriented observed walking without difficulty.  Mucous membranes are moist.  Negative evaluation x 2 in the last 24 hours.  The plan was to give her a dose of Vistaril for her anxiety, oral fluids and nausea medicine.  Patient refused all 3, therefore was discharged home.  Patient in agreement.      Pertinent Labs & Imaging studies reviewed. (See chart for details)  23 year old female presents to the Emergency Department for evaluation of subjective dizziness.    At the conclusion of the encounter I discussed the results of all of the tests and the disposition. The questions were answered. The patient or family acknowledged understanding and was agreeable with the care plan.              Medical Decision Making  Obtained supplemental history:Supplemental history obtained?: No  Reviewed external records: Both inpatient and outpatient computer visits reviewed.  Care impacted by chronic illness: Developmentally delayed, low functioning intelligence, borderline personality disorder, generalized anxiety disorder  Care significantly affected by social determinants of  health:Access to Medical Care  Did you consider but not order tests?: Work up considered but not performed and documented in chart, if applicable  Did you interpret images independently?: Independent interpretation of ECG and images noted in documentation, when applicable.  Consultation discussion with other provider:Did you involve another provider (consultant, MH, pharmacy, etc.)?: No  Patient discharged home.  Patient refused any intervention that was offered here.      MEDICATIONS GIVEN IN THE EMERGENCY:  Medications - No data to display    NEW PRESCRIPTIONS STARTED AT TODAY'S ER VISIT  New Prescriptions    No medications on file          =================================================================    HPI    Patient information was obtained from: The patient.    Use of : N/A       Sadaf Ross is a 23 year old female with a pertinent history of generalized anxiety disorder who presents to this ED today for evaluation of a feeling of dizziness.  Nursing noted patient walking to the room without difficulty.  Negative at ER visit yesterday including chest x-ray, EKG, troponin and D-dimer.  Negative visit earlier today between 2:30 PM and 3:30 PM.  Patient back again claiming that she is dizzy.  No clinical evidence for dizziness is noted.  Denies current anxiety although she has a strong history of this with repetitive complaints of this.    She does not identify any waxing or waning symptoms otherwise, exacerbating or alleviating features, associated symptoms except as mentioned.  She denies any pain related complaints.    REVIEW OF SYSTEMS   Review of Systems claims dizziness.    PAST MEDICAL HISTORY:  Past Medical History:   Diagnosis Date    ADHD (attention deficit hyperactivity disorder)     Bipolar 1 disorder (H)     Borderline personality disorder (H)     Depressive disorder     Intellectual disability     Obesity     Syncope        PAST SURGICAL HISTORY:  Past Surgical History:    Procedure Laterality Date    APPENDECTOMY             CURRENT MEDICATIONS:    acetaminophen (TYLENOL) 325 MG tablet  ARIPiprazole lauroxil ER (ARISTADA) 882 MG/3.2ML intra-muscular  bisacodyl (DULCOLAX) 5 MG EC tablet  cloNIDine (CATAPRES) 0.1 MG tablet  dicyclomine (BENTYL) 20 MG tablet  divalproex sodium extended-release (DEPAKOTE ER) 250 MG 24 hr tablet  docusate sodium (COLACE) 50 MG capsule  famotidine (PEPCID) 20 MG tablet  FLUoxetine (PROZAC) 40 MG capsule  hydrOXYzine (ATARAX) 50 MG tablet  loperamide (IMODIUM A-D) 2 MG tablet  OLANZapine zydis (ZYPREXA) 5 MG ODT  ondansetron (ZOFRAN) 4 MG tablet  pantoprazole (PROTONIX) 40 MG EC tablet  promethazine (PHENERGAN) 12.5 MG tablet  senna-docusate (SENOKOT-S/PERICOLACE) 8.6-50 MG tablet  traZODone (DESYREL) 50 MG tablet  Vitamin D, Cholecalciferol, 25 MCG (1000 UT) TABS        ALLERGIES:  Allergies   Allergen Reactions    Penicillin G     Penicillins Rash and Unknown       FAMILY HISTORY:  Family History   Problem Relation Age of Onset    Diabetes Type 1 Father     Cancer Paternal Grandfather        SOCIAL HISTORY:   Social History     Socioeconomic History    Marital status: Single   Tobacco Use    Smoking status: Some Days     Current packs/day: 0.25     Average packs/day: 0.3 packs/day for 5.0 years (1.3 ttl pk-yrs)     Types: Cigarettes, Vaping Device    Smokeless tobacco: Former   Substance and Sexual Activity    Alcohol use: No    Drug use: No    Sexual activity: Not Currently     Partners: Female, Male     Birth control/protection: Injection     Social Determinants of Health     Financial Resource Strain: At Risk (1/20/2021)    Received from [facility]     Financial Resource Strain     Financial Resource Strain: 2   Food Insecurity: At Risk (1/20/2021)    Received from [facility]     Food Insecurity     Food: 2   Transportation Needs: At Risk (1/20/2021)    Received from [facility]     Transportation Needs     Transportation: 2   Physical  Activity: Not on File (2020)    Received from [facility]     Physical Activity     Physical Activity: 0   Stress: Not at Risk (2021)    Received from [facility]     Stress     Stress: 1   Social Connections: Not at Risk (2021)    Received from [facility]     Social Connections     Social Connections and Isolation: 1   Interpersonal Safety: Not At Risk (2024)    Received from [facility]     Humiliation, Afraid, Rape, and Kick questionnaire     Fear of Current or Ex-Partner: No     Emotionally Abused: No     Physically Abused: No     Sexually Abused: No   Housing Stability: Not at Risk (2021)    Received from [facility]     Housing Stability     Housin     Occasional tobacco.  No drugs or alcohol.  VITALS:  BP (!) 159/86   Pulse 95   Temp 98.9  F (37.2  C)   Resp 14   LMP  (LMP Unknown)   SpO2 100%     PHYSICAL EXAM    Vital Signs:  BP (!) 159/86   Pulse 95   Temp 98.9  F (37.2  C)   Resp 14   LMP  (LMP Unknown)   SpO2 100%     General:  On entering the room she is in no apparent distress.    Neck:  Neck supple with full range of motion and nontender.    Back:  Back and spine are nontender.  No costovertebral angle tenderness.    HEENT:  Oropharynx clear with moist mucous membranes.  HEENT unremarkable.    Pulmonary:  Chest clear to auscultation without rhonchi rales or wheezing.    Cardiovascular:  Cardiac regular rate and rhythm without murmurs rubs or gallops.    Abdomen:  Abdomen soft nontender.  There is no rebound or guarding.    Muskuloskeletal:  She moves all 4 without any difficulty and has normal neurovascular exams.  Extremities without clubbing, cyanosis, or edema.  Legs and calves are nontender.    Neuro:  She is alert and oriented ×3 and moves all extremities symmetrically.    Psych:  Normal affect.  Appears anxious.  No clinical evidence for dehydration or dizziness.  Skin:  Unremarkable and warm and dry.       LAB:  All pertinent labs  reviewed and interpreted.  Labs Ordered and Resulted from Time of ED Arrival to Time of ED Departure - No data to display    RADIOLOGY:  Reviewed all pertinent imaging. Please see official radiology report.  No orders to display           Pacheco Castañeda M.D.  Emergency Medicine  [facility] EMERGENCY DEPARTMENT  [address] [place] [id number]  [phone]  Dept: [phone]       Pacheco Castañeda MD  05/23/24 8014

## 2024-05-25 LAB
ATRIAL RATE - MUSE: 70 BPM
ATRIAL RATE - MUSE: 73 BPM
DIASTOLIC BLOOD PRESSURE - MUSE: NORMAL MMHG
DIASTOLIC BLOOD PRESSURE - MUSE: NORMAL MMHG
INTERPRETATION ECG - MUSE: NORMAL
INTERPRETATION ECG - MUSE: NORMAL
P AXIS - MUSE: 41 DEGREES
P AXIS - MUSE: 51 DEGREES
PR INTERVAL - MUSE: 174 MS
PR INTERVAL - MUSE: 182 MS
QRS DURATION - MUSE: 88 MS
QRS DURATION - MUSE: 88 MS
QT - MUSE: 376 MS
QT - MUSE: 382 MS
QTC - MUSE: 412 MS
QTC - MUSE: 414 MS
R AXIS - MUSE: 25 DEGREES
R AXIS - MUSE: 28 DEGREES
SYSTOLIC BLOOD PRESSURE - MUSE: NORMAL MMHG
SYSTOLIC BLOOD PRESSURE - MUSE: NORMAL MMHG
T AXIS - MUSE: 14 DEGREES
T AXIS - MUSE: 6 DEGREES
VENTRICULAR RATE- MUSE: 70 BPM
VENTRICULAR RATE- MUSE: 73 BPM

## 2024-05-27 ENCOUNTER — HOSPITAL ENCOUNTER (EMERGENCY)
Facility: HOSPITAL | Age: 24
Discharge: HOME | End: 2024-05-27
Admitting: EMERGENCY MEDICINE
Payer: MEDICARE

## 2024-05-27 VITALS
RESPIRATION RATE: 16 BRPM | BODY MASS INDEX: 44.12 KG/M2 | OXYGEN SATURATION: 100 % | HEART RATE: 65 BPM | WEIGHT: 249 LBS | SYSTOLIC BLOOD PRESSURE: 102 MMHG | HEIGHT: 63 IN | DIASTOLIC BLOOD PRESSURE: 61 MMHG | TEMPERATURE: 98.2 F

## 2024-05-27 DIAGNOSIS — F41.9 ANXIETY: ICD-10-CM

## 2024-05-27 DIAGNOSIS — N39.0 UTI (URINARY TRACT INFECTION): ICD-10-CM

## 2024-05-27 LAB
ALBUMIN UR-MCNC: 20 MG/DL
ANION GAP SERPL CALCULATED.3IONS-SCNC: 11 MMOL/L (ref 7–15)
APPEARANCE UR: CLEAR
BILIRUB UR QL STRIP: NEGATIVE
BUN SERPL-MCNC: 15 MG/DL (ref 6–20)
CALCIUM SERPL-MCNC: 9.1 MG/DL (ref 8.6–10)
CHLORIDE SERPL-SCNC: 106 MMOL/L (ref 98–107)
COLOR UR AUTO: YELLOW
CREAT SERPL-MCNC: 0.88 MG/DL (ref 0.51–0.95)
DEPRECATED HCO3 PLAS-SCNC: 23 MMOL/L (ref 22–29)
EGFRCR SERPLBLD CKD-EPI 2021: >90 ML/MIN/1.73M2
ERYTHROCYTE [DISTWIDTH] IN BLOOD BY AUTOMATED COUNT: 13.7 % (ref 10–15)
GLUCOSE SERPL-MCNC: 99 MG/DL (ref 70–99)
GLUCOSE UR STRIP-MCNC: NEGATIVE MG/DL
HCG UR QL: NEGATIVE
HCT VFR BLD AUTO: 36.2 % (ref 35–47)
HGB BLD-MCNC: 12.1 G/DL (ref 11.7–15.7)
HGB UR QL STRIP: ABNORMAL
KETONES UR STRIP-MCNC: NEGATIVE MG/DL
LEUKOCYTE ESTERASE UR QL STRIP: ABNORMAL
MCH RBC QN AUTO: 27.6 PG (ref 26.5–33)
MCHC RBC AUTO-ENTMCNC: 33.4 G/DL (ref 31.5–36.5)
MCV RBC AUTO: 83 FL (ref 78–100)
MUCOUS THREADS #/AREA URNS LPF: PRESENT /LPF
NITRATE UR QL: NEGATIVE
PH UR STRIP: 6 [PH] (ref 5–7)
PLATELET # BLD AUTO: 234 10E3/UL (ref 150–450)
POTASSIUM SERPL-SCNC: 4.6 MMOL/L (ref 3.4–5.3)
RBC # BLD AUTO: 4.38 10E6/UL (ref 3.8–5.2)
RBC URINE: 34 /HPF
SODIUM SERPL-SCNC: 140 MMOL/L (ref 135–145)
SP GR UR STRIP: 1.03 (ref 1–1.03)
SQUAMOUS EPITHELIAL: 4 /HPF
UROBILINOGEN UR STRIP-MCNC: <2 MG/DL
WBC # BLD AUTO: 8.4 10E3/UL (ref 4–11)
WBC URINE: 11 /HPF

## 2024-05-27 PROCEDURE — 81025 URINE PREGNANCY TEST: CPT | Performed by: EMERGENCY MEDICINE

## 2024-05-27 PROCEDURE — 250N000011 HC RX IP 250 OP 636: Performed by: EMERGENCY MEDICINE

## 2024-05-27 PROCEDURE — 99284 EMERGENCY DEPT VISIT MOD MDM: CPT | Mod: 25

## 2024-05-27 PROCEDURE — 258N000003 HC RX IP 258 OP 636: Performed by: EMERGENCY MEDICINE

## 2024-05-27 PROCEDURE — 96374 THER/PROPH/DIAG INJ IV PUSH: CPT

## 2024-05-27 PROCEDURE — 250N000013 HC RX MED GY IP 250 OP 250 PS 637: Performed by: EMERGENCY MEDICINE

## 2024-05-27 PROCEDURE — 36415 COLL VENOUS BLD VENIPUNCTURE: CPT | Performed by: EMERGENCY MEDICINE

## 2024-05-27 PROCEDURE — 80048 BASIC METABOLIC PNL TOTAL CA: CPT | Performed by: EMERGENCY MEDICINE

## 2024-05-27 PROCEDURE — 85027 COMPLETE CBC AUTOMATED: CPT | Performed by: EMERGENCY MEDICINE

## 2024-05-27 PROCEDURE — 87086 URINE CULTURE/COLONY COUNT: CPT | Performed by: EMERGENCY MEDICINE

## 2024-05-27 PROCEDURE — 81001 URINALYSIS AUTO W/SCOPE: CPT | Performed by: EMERGENCY MEDICINE

## 2024-05-27 RX ORDER — SULFAMETHOXAZOLE/TRIMETHOPRIM 800-160 MG
1 TABLET ORAL 2 TIMES DAILY
Qty: 20 TABLET | Refills: 0 | Status: SHIPPED | OUTPATIENT
Start: 2024-05-27 | End: 2024-05-27

## 2024-05-27 RX ORDER — ONDANSETRON 2 MG/ML
4 INJECTION INTRAMUSCULAR; INTRAVENOUS ONCE
Status: COMPLETED | OUTPATIENT
Start: 2024-05-27 | End: 2024-05-27

## 2024-05-27 RX ORDER — SULFAMETHOXAZOLE/TRIMETHOPRIM 800-160 MG
1 TABLET ORAL 2 TIMES DAILY
Qty: 20 TABLET | Refills: 0 | Status: SHIPPED | OUTPATIENT
Start: 2024-05-27 | End: 2024-06-06

## 2024-05-27 RX ORDER — HYDROXYZINE HYDROCHLORIDE 25 MG/1
50 TABLET, FILM COATED ORAL ONCE
Status: COMPLETED | OUTPATIENT
Start: 2024-05-27 | End: 2024-05-27

## 2024-05-27 RX ORDER — SULFAMETHOXAZOLE/TRIMETHOPRIM 800-160 MG
1 TABLET ORAL ONCE
Status: COMPLETED | OUTPATIENT
Start: 2024-05-27 | End: 2024-05-27

## 2024-05-27 RX ADMIN — ONDANSETRON 4 MG: 2 INJECTION INTRAMUSCULAR; INTRAVENOUS at 11:44

## 2024-05-27 RX ADMIN — SULFAMETHOXAZOLE AND TRIMETHOPRIM 1 TABLET: 800; 160 TABLET ORAL at 11:50

## 2024-05-27 RX ADMIN — SODIUM CHLORIDE 1000 ML: 9 INJECTION, SOLUTION INTRAVENOUS at 11:43

## 2024-05-27 RX ADMIN — HYDROXYZINE HYDROCHLORIDE 50 MG: 25 TABLET, FILM COATED ORAL at 11:26

## 2024-05-27 ASSESSMENT — ACTIVITIES OF DAILY LIVING (ADL)
ADLS_ACUITY_SCORE: 39
ADLS_ACUITY_SCORE: 39

## 2024-05-27 NOTE — DISCHARGE INSTRUCTIONS
You were seen here for urinary frequency, pain with urination and vomiting. At this time, it does appear that you have a UTI. With your vomiting, will cover with a longer course and recommend close follow-up with primary care in 3 days if symptoms are not improving.     Continue to use your anxiety medications at home.    Of course if you develop significant abdominal pain, fevers, uncontrolled vomiting or other concerns.

## 2024-05-27 NOTE — ED NOTES
[facility]  Expected date:   Expected time:   Means of arrival:   Comments:  [facility]- 24yo F Vomiting, burning with urination

## 2024-05-27 NOTE — ED TRIAGE NOTES
Pt arrives by EMS from [facility] with c/o low abdominal pain, burning on urination, frequency, and urgency for last 2 days. Pt states she has vomiting x 3 for the last 2 days     Triage Assessment (Adult)       Row Name 05/27/24 1023          Triage Assessment    Airway WDL WDL        Respiratory WDL    Respiratory WDL WDL        Skin Circulation/Temperature WDL    Skin Circulation/Temperature WDL WDL        Cardiac WDL    Cardiac WDL WDL        Peripheral/Neurovascular WDL    Peripheral Neurovascular WDL WDL        Cognitive/Neuro/Behavioral WDL    Cognitive/Neuro/Behavioral WDL WDL

## 2024-05-27 NOTE — ED PROVIDER NOTES
EMERGENCY DEPARTMENT ENCOUNTER      NAME: Sadaf Ross  AGE: 23 year old female  YOB: 2000  MRN: [id number]  EVALUATION DATE & TIME: 5/27/2024 10:19 AM    PCP: [facility]    ED PROVIDER: Kristina Genao PA-C      Chief Complaint   Patient presents with    UTI symptoms         FINAL IMPRESSION:  1. UTI (urinary tract infection)    2. Anxiety          MEDICAL DECISION MAKING:    Pertinent Labs & Imaging studies reviewed. (See chart for details)  Sadaf Ross is a 23 year old female with a pertinent history of borderline personality disorder, intellectual disability, history of suicide attempt who presents to this ED via EMS for evaluation of nausea, vomiting and urinary symptoms.  For the past few days the patient has had increased urinary frequency, burning with urination and urgency.  She has also had some nausea and vomiting and increased anxiety due to an upcoming court case.  Her symptoms continued and she was concerned about infection and presented to the emergency department.  On my evaluation, patient was vitally stable and overall well-appearing.  Examination with abdomen soft, nontender without any rebound or guarding.  No CVA tenderness.  Differential diagnosis included UTI, pregnancy, STI, pyelonephritis, kidney stone.      No CVA tenderness, fevers and low suspicion for kidney stone at this time and do not feel CT of the abdomen pelvis is indicated.  Patient is having nausea and vomiting however, has increased anxiety due to an upcoming court case and this may be contributing to the nausea and vomiting as she does have a history of this with increased anxiety.  CBC and BMP without derangement including a normal white blood cell count.  No fevers reported and low suspicion for kidney infection however, with UA concerning for infection will cover with 10 days of Bactrim.  Patient has an allergy to penicillins that says rash and unknown and will forego Keflex at this time due to  this.  Patient reassured and first dose of Bactrim given here in the emergency department.  We discussed continuation of her antianxiety medications at home and she was given a dose of Atarax here to help with symptoms.  Patient requesting discharge home which I feel is reasonable.  No concern for STI and do not feel STI testing is indicated.  We discussed return precautions and all questions were answered the best my ability.  She was discharged home in stable condition.    Medical Decision Making  Obtained supplemental history:Supplemental history obtained?: No  Reviewed external records: External records reviewed?: Outpatient Record: ED visit 5/24/2024 at [facility] for dizziness, nausea and vomiting.  UA there did not show signs of infection.  Workup otherwise negative.  Sent home as thought to be related to anxiety.  Care impacted by chronic illness:Mental Health  Care significantly affected by social determinants of health:Access to Medical Care  Did you consider but not order tests?: Work up considered but not performed and documented in chart, if applicable  Did you interpret images independently?: Independent interpretation of ECG and images noted in documentation, when applicable.  Consultation discussion with other provider:Did you involve another provider (consultant, , pharmacy, etc.)?: No  Discharge. I prescribed additional prescription strength medication(s) as charted. See documentation for any additional details.     ED COURSE:  10:30 AM I met with the patient, obtained history, performed an initial exam, and discussed options and plan for diagnostics and treatment here in the ED.    11:51 AM Patient discharged after being provided with extensive anticipatory guidance and given return precautions, importance of PCP follow-up emphasized.    At the conclusion of the encounter I discussed the results of all of the tests and the disposition. The questions were answered. The patient or family  acknowledged understanding and was agreeable with the care plan.     MEDICATIONS GIVEN IN THE EMERGENCY:  Medications   ondansetron (ZOFRAN) injection 4 mg (4 mg Intravenous $Given 5/27/24 1144)   sodium chloride 0.9% BOLUS 1,000 mL (0 mLs Intravenous Stopped 5/27/24 1158)   hydrOXYzine HCl (ATARAX) tablet 50 mg (50 mg Oral $Given 5/27/24 1126)   sulfamethoxazole-trimethoprim (BACTRIM DS) 800-160 MG per tablet 1 tablet (1 tablet Oral $Given 5/27/24 1150)       NEW PRESCRIPTIONS STARTED AT TODAY'S ER VISIT  Current Discharge Medication List        START taking these medications    Details   sulfamethoxazole-trimethoprim (BACTRIM DS) 800-160 MG tablet Take 1 tablet by mouth 2 times daily for 10 days  Qty: 20 tablet, Refills: 0                  =================================================================    HPI:    Patient information was obtained from: The patient    Use of Interpretor: N/A          Sadaf AMAN Ross is a 23 year old female with a pertinent history of borderline personality disorder, intellectual disability, history of suicide attempt who presents to this ED via EMS for evaluation of nausea, vomiting and urinary symptoms.  For the past few days the patient has had increased urinary frequency, burning with urination and urgency.  She has also had some nausea and vomiting and increased anxiety due to an upcoming court case.  Her symptoms continued and she was concerned about infection and presented to the emergency department.  On my evaluation, patient denies any fevers, chills.  No current nausea or vomiting.  No abdominal pain or back pain.  Patient does have a history of UTIs however, has not had one in quite some time.  No abnormal vaginal bleeding or discharge.  No concern for STI. No other concerns voiced at this time.      REVIEW OF SYSTEMS:  Negative unless otherwise stated in the above HPI.       PAST MEDICAL HISTORY:  Past Medical History:   Diagnosis Date    ADHD (attention deficit  hyperactivity disorder)     Bipolar 1 disorder (H)     Borderline personality disorder (H)     Depressive disorder     Intellectual disability     Obesity     Syncope        PAST SURGICAL HISTORY:  Past Surgical History:   Procedure Laterality Date    APPENDECTOMY             CURRENT MEDICATIONS:    No current facility-administered medications for this encounter.    Current Outpatient Medications:     sulfamethoxazole-trimethoprim (BACTRIM DS) 800-160 MG tablet, Take 1 tablet by mouth 2 times daily for 10 days, Disp: 20 tablet, Rfl: 0    acetaminophen (TYLENOL) 325 MG tablet, Take 650 mg by mouth every 6 hours as needed for mild pain, Disp: , Rfl:     ARIPiprazole lauroxil ER (ARISTADA) 882 MG/3.2ML intra-muscular, Inject 882 mg into the muscle every 28 days On the 22nd of each month, Disp: , Rfl:     bisacodyl (DULCOLAX) 5 MG EC tablet, Take 1 tablet (5 mg) by mouth daily as needed for constipation, Disp: 30 tablet, Rfl: 0    cloNIDine (CATAPRES) 0.1 MG tablet, Take 1 tablet by mouth daily, Disp: , Rfl:     dicyclomine (BENTYL) 20 MG tablet, Take 20 mg by mouth 2 times daily as needed (dyspepsia), Disp: , Rfl:     divalproex sodium extended-release (DEPAKOTE ER) 250 MG 24 hr tablet, Take 500 mg by mouth daily, Disp: , Rfl:     docusate sodium (COLACE) 50 MG capsule, Take 100 mg by mouth 2 times daily, Disp: , Rfl:     famotidine (PEPCID) 20 MG tablet, Take 20 mg by mouth daily, Disp: , Rfl:     FLUoxetine (PROZAC) 40 MG capsule, Take 80 mg by mouth daily, Disp: , Rfl:     hydrOXYzine (ATARAX) 50 MG tablet, Take 50 mg by mouth 2 times daily as needed for anxiety, Disp: , Rfl:     loperamide (IMODIUM A-D) 2 MG tablet, Take 2 tablets (4 mg) in the morning.  Take 1 tablet (2 mg) with every loose stool.  Do not exceed 8 tablets in a day., Disp: 30 tablet, Rfl: 0    OLANZapine zydis (ZYPREXA) 5 MG ODT, Take 1 tablet (5 mg) by mouth At Bedtime, Disp: 30 tablet, Rfl: 0    ondansetron (ZOFRAN) 4 MG tablet, Take 1 tablet (4  mg) by mouth every 8 hours as needed, Disp: 15 tablet, Rfl: 0    pantoprazole (PROTONIX) 40 MG EC tablet, Take 40 mg by mouth daily, Disp: , Rfl:     promethazine (PHENERGAN) 12.5 MG tablet, Take 12.5 mg by mouth every 4 hours, Disp: , Rfl:     senna-docusate (SENOKOT-S/PERICOLACE) 8.6-50 MG tablet, Take 1 tablet by mouth 2 times daily, Disp: , Rfl:     traZODone (DESYREL) 50 MG tablet, Take 100 mg by mouth At Bedtime, Disp: , Rfl:     Vitamin D, Cholecalciferol, 25 MCG (1000 UT) TABS, Take 1,000 Units by mouth daily , Disp: , Rfl:       ALLERGIES:  Allergies   Allergen Reactions    Penicillin G     Penicillins Rash and Unknown       FAMILY HISTORY:  Family History   Problem Relation Age of Onset    Diabetes Type 1 Father     Cancer Paternal Grandfather        SOCIAL HISTORY:   Social History     Socioeconomic History    Marital status: Single   Tobacco Use    Smoking status: Some Days     Current packs/day: 0.25     Average packs/day: 0.3 packs/day for 5.0 years (1.3 ttl pk-yrs)     Types: Cigarettes, Vaping Device    Smokeless tobacco: Former   Substance and Sexual Activity    Alcohol use: No    Drug use: No    Sexual activity: Not Currently     Partners: Female, Male     Birth control/protection: Injection     Social Determinants of Health     Financial Resource Strain: At Risk (1/20/2021)    Received from [facility]     Financial Resource Strain     Financial Resource Strain: 2   Food Insecurity: At Risk (1/20/2021)    Received from [facility]     Food Insecurity     Food: 2   Transportation Needs: At Risk (1/20/2021)    Received from [facility]     Transportation Needs     Transportation: 2   Physical Activity: Not on File (7/17/2020)    Received from [facility]     Physical Activity     Physical Activity: 0   Stress: Not at Risk (1/20/2021)    Received from [facility]     Stress     Stress: 1   Social Connections: Not at Risk (1/20/2021)    Received from [facility]     Social Connections      "Social Connections and Isolation: 1   Interpersonal Safety: Not At Risk (2024)    Received from [facility]     Humiliation, Afraid, Rape, and Kick questionnaire     Fear of Current or Ex-Partner: No     Emotionally Abused: No     Physically Abused: No     Sexually Abused: No   Housing Stability: Not at Risk (2021)    Received from [facility]     Housing Stability     Housin       VITALS:  Patient Vitals for the past 24 hrs:   BP Temp Temp src Pulse Resp SpO2 Height Weight   24 1200 102/61 -- -- 65 -- 100 % -- --   24 1143 99/56 -- -- 85 -- 98 % -- --   24 1021 131/76 98.2  F (36.8  C) Oral 91 16 98 % 1.6 m (5' 3\") 112.9 kg (249 lb)       PHYSICAL EXAM    Constitutional: Well developed, Well nourished, NAD  HENT: Normocephalic, Atraumatic, Bilateral external ears normal, Oropharynx normal, mucous membranes moist, Nose normal.   Neck: Normal range of motion, No tenderness, Supple, No stridor.  Eyes: Eyes track normally throughout exam, Conjunctiva normal, No discharge.   Respiratory: Normal breath sounds, No respiratory distress, No wheezing, Speaks full sentences easily. No cough.  Cardiovascular: Normal heart rate, Regular rhythm, No murmurs, No rubs, No gallops. Chest wall nontender.  GI: Soft, No tenderness, No masses, No flank tenderness. No rebound or guarding. No CVA tenderness.  Musculoskeletal: Good range of motion in all major joints.   Integument: Warm, Dry, No erythema, No rash. No petechiae.  Neurologic: Alert & oriented x 3, Normal motor function, Normal sensory function, No focal deficits noted. Normal gait.  Psychiatric: Affect normal, Judgment normal, Mood normal. Cooperative.    LAB:  All pertinent labs reviewed and interpreted.  Recent Results (from the past 24 hour(s))   UA with Microscopic reflex to Culture    Collection Time: 24 10:33 AM    Specimen: Urine, Clean Catch   Result Value Ref Range    Color Urine Yellow Colorless, Straw, Light " Yellow, Yellow    Appearance Urine Clear Clear    Glucose Urine Negative Negative mg/dL    Bilirubin Urine Negative Negative    Ketones Urine Negative Negative mg/dL    Specific Gravity Urine 1.031 (H) 1.001 - 1.030    Blood Urine 0.2 mg/dL (A) Negative    pH Urine 6.0 5.0 - 7.0    Protein Albumin Urine 20 (A) Negative mg/dL    Urobilinogen Urine <2.0 <2.0 mg/dL    Nitrite Urine Negative Negative    Leukocyte Esterase Urine 75 Mindy/uL (A) Negative    Mucus Urine Present (A) None Seen /LPF    RBC Urine 34 (H) <=2 /HPF    WBC Urine 11 (H) <=5 /HPF    Squamous Epithelials Urine 4 (H) <=1 /HPF   HCG qualitative urine    Collection Time: 05/27/24 10:33 AM   Result Value Ref Range    hCG Urine Qualitative Negative Negative   CBC (+ platelets, no diff)    Collection Time: 05/27/24 11:07 AM   Result Value Ref Range    WBC Count 8.4 4.0 - 11.0 10e3/uL    RBC Count 4.38 3.80 - 5.20 10e6/uL    Hemoglobin 12.1 11.7 - 15.7 g/dL    Hematocrit 36.2 35.0 - 47.0 %    MCV 83 78 - 100 fL    MCH 27.6 26.5 - 33.0 pg    MCHC 33.4 31.5 - 36.5 g/dL    RDW 13.7 10.0 - 15.0 %    Platelet Count 234 150 - 450 10e3/uL   Basic metabolic panel    Collection Time: 05/27/24 11:07 AM   Result Value Ref Range    Sodium 140 135 - 145 mmol/L    Potassium 4.6 3.4 - 5.3 mmol/L    Chloride 106 98 - 107 mmol/L    Carbon Dioxide (CO2) 23 22 - 29 mmol/L    Anion Gap 11 7 - 15 mmol/L    Urea Nitrogen 15.0 6.0 - 20.0 mg/dL    Creatinine 0.88 0.51 - 0.95 mg/dL    GFR Estimate >90 >60 mL/min/1.73m2    Calcium 9.1 8.6 - 10.0 mg/dL    Glucose 99 70 - 99 mg/dL         RADIOLOGY:  Reviewed all pertinent imaging. Please see official radiology report.  No orders to display       PROCEDURES:   None.     Kristina Genao PA-C  Emergency Medicine  [facility]  5/27/2024      Kristina Genao PA-C  05/27/24 1212

## 2024-05-27 NOTE — ED NOTES
Assumed care of pt. Given atarax and able to take without difficulty. Does c/o n/v but not since yesterday. Is alert and talkative. States she has a UTI

## 2024-05-28 LAB — BACTERIA UR CULT: NORMAL

## 2024-05-28 PROCEDURE — 99283 EMERGENCY DEPT VISIT LOW MDM: CPT | Performed by: EMERGENCY MEDICINE

## 2024-05-29 ENCOUNTER — HOSPITAL ENCOUNTER (EMERGENCY)
Facility: CLINIC | Age: 24
Discharge: HOME | End: 2024-05-29
Attending: EMERGENCY MEDICINE | Admitting: EMERGENCY MEDICINE
Payer: MEDICARE

## 2024-05-29 VITALS
RESPIRATION RATE: 16 BRPM | HEART RATE: 97 BPM | BODY MASS INDEX: 44.12 KG/M2 | DIASTOLIC BLOOD PRESSURE: 81 MMHG | HEIGHT: 63 IN | TEMPERATURE: 98.1 F | WEIGHT: 249 LBS | OXYGEN SATURATION: 97 % | SYSTOLIC BLOOD PRESSURE: 113 MMHG

## 2024-05-29 DIAGNOSIS — R11.0 NAUSEA: ICD-10-CM

## 2024-05-29 PROCEDURE — 99283 EMERGENCY DEPT VISIT LOW MDM: CPT | Performed by: EMERGENCY MEDICINE

## 2024-05-29 PROCEDURE — 250N000013 HC RX MED GY IP 250 OP 250 PS 637: Performed by: EMERGENCY MEDICINE

## 2024-05-29 RX ORDER — OLANZAPINE 10 MG/1
10 TABLET, ORALLY DISINTEGRATING ORAL ONCE
Status: COMPLETED | OUTPATIENT
Start: 2024-05-29 | End: 2024-05-29

## 2024-05-29 RX ADMIN — OLANZAPINE 10 MG: 10 TABLET, ORALLY DISINTEGRATING ORAL at 19:26

## 2024-05-29 ASSESSMENT — COLUMBIA-SUICIDE SEVERITY RATING SCALE - C-SSRS
2. HAVE YOU ACTUALLY HAD ANY THOUGHTS OF KILLING YOURSELF IN THE PAST MONTH?: NO
6. HAVE YOU EVER DONE ANYTHING, STARTED TO DO ANYTHING, OR PREPARED TO DO ANYTHING TO END YOUR LIFE?: NO
1. IN THE PAST MONTH, HAVE YOU WISHED YOU WERE DEAD OR WISHED YOU COULD GO TO SLEEP AND NOT WAKE UP?: NO
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
## 2024-05-29 NOTE — ED TRIAGE NOTES
Patient reports currently taking antibiotics for UTI and the medication makes her nauseated.      Triage Assessment (Adult)       Row Name 05/29/24 4526          Triage Assessment    Airway WDL WDL        Respiratory WDL    Respiratory WDL WDL        Skin Circulation/Temperature WDL    Skin Circulation/Temperature WDL WDL        Cardiac WDL    Cardiac WDL WDL        Peripheral/Neurovascular WDL    Peripheral Neurovascular WDL WDL        Cognitive/Neuro/Behavioral WDL    Cognitive/Neuro/Behavioral WDL WDL

## 2024-05-30 ENCOUNTER — HOSPITAL ENCOUNTER (EMERGENCY)
Facility: HOSPITAL | Age: 24
Discharge: HOME | End: 2024-05-30
Attending: EMERGENCY MEDICINE | Admitting: EMERGENCY MEDICINE
Payer: MEDICARE

## 2024-05-30 VITALS
BODY MASS INDEX: 45.82 KG/M2 | DIASTOLIC BLOOD PRESSURE: 64 MMHG | HEIGHT: 62 IN | RESPIRATION RATE: 14 BRPM | TEMPERATURE: 97.4 F | OXYGEN SATURATION: 100 % | WEIGHT: 249 LBS | HEART RATE: 76 BPM | SYSTOLIC BLOOD PRESSURE: 108 MMHG

## 2024-05-30 DIAGNOSIS — F41.9 ANXIETY: ICD-10-CM

## 2024-05-30 PROCEDURE — 99283 EMERGENCY DEPT VISIT LOW MDM: CPT

## 2024-05-30 ASSESSMENT — COLUMBIA-SUICIDE SEVERITY RATING SCALE - C-SSRS
2. HAVE YOU ACTUALLY HAD ANY THOUGHTS OF KILLING YOURSELF IN THE PAST MONTH?: YES
6. HAVE YOU EVER DONE ANYTHING, STARTED TO DO ANYTHING, OR PREPARED TO DO ANYTHING TO END YOUR LIFE?: YES
1. IN THE PAST MONTH, HAVE YOU WISHED YOU WERE DEAD OR WISHED YOU COULD GO TO SLEEP AND NOT WAKE UP?: YES
3. HAVE YOU BEEN THINKING ABOUT HOW YOU MIGHT KILL YOURSELF?: NO
4. HAVE YOU HAD THESE THOUGHTS AND HAD SOME INTENTION OF ACTING ON THEM?: NO
5. HAVE YOU STARTED TO WORK OUT OR WORKED OUT THE DETAILS OF HOW TO KILL YOURSELF? DO YOU INTEND TO CARRY OUT THIS PLAN?: NO

## 2024-05-30 ASSESSMENT — ACTIVITIES OF DAILY LIVING (ADL): ADLS_ACUITY_SCORE: 39

## 2024-05-30 NOTE — ED TRIAGE NOTES
BIBA from group home for Suicidal Ideations without plan     Triage Assessment (Adult)       Row Name 05/30/24 1210          Triage Assessment    Airway WDL WDL        Respiratory WDL    Respiratory WDL WDL        Skin Circulation/Temperature WDL    Skin Circulation/Temperature WDL WDL        Cardiac WDL    Cardiac WDL WDL        Peripheral/Neurovascular WDL    Peripheral Neurovascular WDL WDL        Cognitive/Neuro/Behavioral WDL    Cognitive/Neuro/Behavioral WDL WDL

## 2024-05-30 NOTE — ED NOTES
Bed: JNED-07  Expected date: 5/30/24  Expected time: 11:53 AM  Means of arrival: Ambulance  Comments:  Isai Stuart SI

## 2024-05-30 NOTE — ED PROVIDER NOTES
EMERGENCY DEPARTMENT ENCOUNTER      NAME: Sadaf Ross  AGE: 23 year old female  YOB: 2000  MRN: [id number]  EVALUATION DATE & TIME: 5/30/2024 12:08 PM    PCP: [facility]    ED PROVIDER: Lynne Solis DO      Chief Complaint   Patient presents with    Suicidal         FINAL IMPRESSION:  1. Anxiety          ED COURSE & MEDICAL DECISION MAKING:    Pertinent Labs & Imaging studies reviewed. (See chart for details)  12:55 PM I met the patient and performed my initial interview and exam. We discussed the plan for discharge and the patient is agreeable. Reviewed supportive cares, symptomatic treatment, outpatient follow up, and reasons to return to the Emergency Department. Patient to be discharged by ED RN.      23 year old female presents to the Emergency Department for evaluation of mental health concerns.  Initial triage note reports suicidal ideation without a plan.  Patient's prior chart reviewed.  She resides in a group home.  Patient is seen in the emergency department almost daily.  She was seen on 5/28 for some anxiety related to a recent court date.  Not felt that she would benefit from mental health admission.  She was also seen twice yesterday for nausea and vomiting.  On my exam patient has no medical concerns.  She is calm and cooperative.  She is awake alert and oriented.  She denies any thoughts of self-harm or harm to others, reports that she had some anxiety and that has since passed.  She wishes to be discharged.  Nursing did call her group home who are in agreements and did not provide any other collateral information.    At the conclusion of the encounter I discussed the results of all of the tests and the disposition. The questions were answered. The patient or family acknowledged understanding and was agreeable with the care plan.     Medical Decision Making  Obtained supplemental history:Supplemental history obtained?: No  Reviewed external records: External records  reviewed?: Outpatient Record: 05/28/2024 [facility] ED  Care impacted by chronic illness:Mental Health  Care significantly affected by social determinants of health:N/A  Did you consider but not order tests?: Work up considered but not performed and documented in chart, if applicable  Did you interpret images independently?: Independent interpretation of ECG and images noted in documentation, when applicable.  Consultation discussion with other provider:Did you involve another provider (consultant, , pharmacy, etc.)?: No  Discharge. No recommendations on prescription strength medication(s). See documentation for any additional details.      MEDICATIONS GIVEN IN THE EMERGENCY:  Medications - No data to display    NEW PRESCRIPTIONS STARTED AT TODAY'S ER VISIT  New Prescriptions    No medications on file          =================================================================    HPI    Patient information was obtained from: Patient     Use of : N/A         Sadaf Ross is a 23 year old female with a pertinent history of borderline personality disorder, suicidal ideation, bipolar affective disorder, intellectual disability, and suicide attempt who presents to this ED by EMS for evaluation of suicidal ideation.    The patient reports waking up today feeling anxious with passive suicidal thoughts. Now, she states that the passive suicidal thoughts are gone. She denies thoughts of hurting herself or any plans of suicide.    Requesting to be discharged back to her group home at this time.    Per chart review, the patient has been seen in various emergency departments over 30 times within the last month. The patient was seen at [facility] Emergency Room on 05/28/2024 for evaluation of depression. Patient seen by crisis team who did not feel she would benefit or need inpatient psychiatric care/treatment. Patient discharged to her group home.    REVIEW OF SYSTEMS   Per HPI    PAST MEDICAL  HISTORY:  Past Medical History:   Diagnosis Date    ADHD (attention deficit hyperactivity disorder)     Bipolar 1 disorder (H)     Borderline personality disorder (H)     Depressive disorder     Intellectual disability     Obesity     Syncope        PAST SURGICAL HISTORY:  Past Surgical History:   Procedure Laterality Date    APPENDECTOMY             CURRENT MEDICATIONS:    acetaminophen (TYLENOL) 325 MG tablet  ARIPiprazole lauroxil ER (ARISTADA) 882 MG/3.2ML intra-muscular  bisacodyl (DULCOLAX) 5 MG EC tablet  cloNIDine (CATAPRES) 0.1 MG tablet  dicyclomine (BENTYL) 20 MG tablet  divalproex sodium extended-release (DEPAKOTE ER) 250 MG 24 hr tablet  docusate sodium (COLACE) 50 MG capsule  famotidine (PEPCID) 20 MG tablet  FLUoxetine (PROZAC) 40 MG capsule  hydrOXYzine (ATARAX) 50 MG tablet  loperamide (IMODIUM A-D) 2 MG tablet  OLANZapine zydis (ZYPREXA) 5 MG ODT  ondansetron (ZOFRAN) 4 MG tablet  pantoprazole (PROTONIX) 40 MG EC tablet  promethazine (PHENERGAN) 12.5 MG tablet  senna-docusate (SENOKOT-S/PERICOLACE) 8.6-50 MG tablet  sulfamethoxazole-trimethoprim (BACTRIM DS) 800-160 MG tablet  traZODone (DESYREL) 50 MG tablet  Vitamin D, Cholecalciferol, 25 MCG (1000 UT) TABS         ALLERGIES:  Allergies   Allergen Reactions    Penicillins Rash and Unknown       FAMILY HISTORY:  Family History   Problem Relation Age of Onset    Diabetes Type 1 Father     Cancer Paternal Grandfather        SOCIAL HISTORY:   Social History     Socioeconomic History    Marital status: Single   Tobacco Use    Smoking status: Some Days     Current packs/day: 0.25     Average packs/day: 0.3 packs/day for 5.0 years (1.3 ttl pk-yrs)     Types: Cigarettes, Vaping Device    Smokeless tobacco: Former   Substance and Sexual Activity    Alcohol use: No    Drug use: No    Sexual activity: Not Currently     Partners: Female, Male     Birth control/protection: Injection     Social Determinants of Health     Financial Resource Strain: At Risk  "(2021)    Received from [facility]     Financial Resource Strain     Financial Resource Strain: 2   Food Insecurity: At Risk (2021)    Received from [facility]     Food Insecurity     Food: 2   Transportation Needs: At Risk (2021)    Received from [facility]     Transportation Needs     Transportation: 2   Physical Activity: Not on File (2020)    Received from [facility]     Physical Activity     Physical Activity: 0   Stress: Not at Risk (2021)    Received from [facility]     Stress     Stress: 1   Social Connections: Not at Risk (2021)    Received from [facility]     Social Connections     Social Connections and Isolation: 1   Interpersonal Safety: Not At Risk (2024)    Received from [facility]     Humiliation, Afraid, Rape, and Kick questionnaire     Fear of Current or Ex-Partner: No     Emotionally Abused: No     Physically Abused: No     Sexually Abused: No   Housing Stability: Not at Risk (2021)    Received from [facility]     Housing Stability     Housin       VITALS:  /64   Pulse 76   Temp 97.4  F (36.3  C) (Oral)   Resp 14   Ht 1.575 m (5' 2\")   Wt 112.9 kg (249 lb)   LMP  (LMP Unknown)   SpO2 100%   BMI 45.54 kg/m      PHYSICAL EXAM    Physical Exam  Vitals and nursing note reviewed.   Constitutional:       General: She is not in acute distress.     Appearance: Normal appearance.   HENT:      Head: Normocephalic and atraumatic.   Eyes:      Pupils: Pupils are equal, round, and reactive to light.   Cardiovascular:      Rate and Rhythm: Normal rate and regular rhythm.   Pulmonary:      Effort: Pulmonary effort is normal.   Abdominal:      General: Abdomen is flat.   Musculoskeletal:         General: Normal range of motion.   Skin:     General: Skin is warm and dry.   Neurological:      General: No focal deficit present.      Mental Status: She is alert.      Gait: Gait normal.   Psychiatric:         Attention and " Perception: Attention normal.         Behavior: Behavior is cooperative.         Thought Content: Thought content is not paranoid. Thought content does not include homicidal or suicidal ideation.           LAB:  All pertinent labs reviewed and interpreted.  Labs Ordered and Resulted from Time of ED Arrival to Time of ED Departure - No data to display    RADIOLOGY:  Reviewed all pertinent imaging. Please see official radiology report.  No orders to display       [place], am serving as a scribe to document services personally performed by Dr. Lynne Solis based on my observation and the provider's statements to me. Lynne MCGINNIS, DO attest that Carmenruben Lorenzo is acting in a scribe capacity, has observed my performance of the services and has documented them in accordance with my direction.    Lynne Solis DO  Emergency Medicine  [facility] EMERGENCY DEPARTMENT  [address] [place] [id number]  [phone]  Dept: [phone]       Lynne Solis DO  05/30/24 1422

## 2024-05-31 ENCOUNTER — HOSPITAL ENCOUNTER (EMERGENCY)
Facility: HOSPITAL | Age: 24
Discharge: HOME | End: 2024-05-31
Attending: STUDENT IN AN ORGANIZED HEALTH CARE EDUCATION/TRAINING PROGRAM | Admitting: STUDENT IN AN ORGANIZED HEALTH CARE EDUCATION/TRAINING PROGRAM
Payer: MEDICARE

## 2024-05-31 VITALS
RESPIRATION RATE: 20 BRPM | TEMPERATURE: 98.5 F | DIASTOLIC BLOOD PRESSURE: 70 MMHG | SYSTOLIC BLOOD PRESSURE: 115 MMHG | OXYGEN SATURATION: 98 % | HEART RATE: 82 BPM

## 2024-05-31 DIAGNOSIS — F41.9 ANXIETY: ICD-10-CM

## 2024-05-31 PROCEDURE — 99282 EMERGENCY DEPT VISIT SF MDM: CPT

## 2024-05-31 ASSESSMENT — ACTIVITIES OF DAILY LIVING (ADL): ADLS_ACUITY_SCORE: 37

## 2024-05-31 NOTE — ED TRIAGE NOTES
Triage Assessment (Adult)       Row Name 05/31/24 1521          Triage Assessment    Airway WDL WDL        Respiratory WDL    Respiratory WDL WDL        Skin Circulation/Temperature WDL    Skin Circulation/Temperature WDL WDL        Cardiac WDL    Cardiac WDL WDL        Peripheral/Neurovascular WDL    Peripheral Neurovascular WDL WDL        Cognitive/Neuro/Behavioral WDL    Cognitive/Neuro/Behavioral WDL WDL                   States she is having problems with her mental health. Is not able to eat or drink fluids. Having episodes of vomiting. C\o increased anxiety and is always stressed out. Says her mental health is getting in the way of her daily activities.

## 2024-05-31 NOTE — ED PROVIDER NOTES
EMERGENCY DEPARTMENT ENCOUNTER      NAME: Sadaf Ross  AGE: 23 year old female  YOB: 2000  MRN: [id number]  EVALUATION DATE & TIME: 5/31/2024  3:30 PM    PCP: [facility]    ED PROVIDER: Joel Miller M.D.      Chief Complaint   Patient presents with    Altered Mental Status         FINAL IMPRESSION:  1. Anxiety          ED COURSE & MEDICAL DECISION MAKING:    Pertinent Labs & Imaging studies reviewed. (See chart for details)  23 year old female presents to the Emergency Department for evaluation of anxiety symptoms.  Patient has had over 30 ER visits this month alone for similar complaints.  Chart was reviewed and previous visits including visits with her therapist were reviewed and patient's presentations all are similar.  It is a bit unclear exactly what the secondary gain is here and it may in fact be that the patient does have some anxious episodes that are resolved by coming to the emergency department.  Here in the ER the patient denied any suicidal or homicidal ideation.  When I saw her she had been here for 45 minutes and stated that she was feeling better and wanted to call her ride.  I felt that with her stable and the symptoms not having changed much this was an appropriate plan.    At the conclusion of the encounter I discussed the results of all of the tests and the disposition. The questions were answered. The patient or family acknowledged understanding and was agreeable with the care plan.        3:45 PM I met with the patient for the initial interview and physical examination. We discussed the plan for discharge and the patient is agreeable. Reviewed supportive cares, symptomatic treatment, outpatient follow up, and reasons to return to the Emergency Department. All questions and concerns were addressed. Patient to be discharged by ED RN.          Medical Decision Making  Obtained supplemental history:Supplemental history obtained?: No  Reviewed external records: External  records reviewed?: Documented in chart and Outpatient Record: [facility] emergency care center on 5/31/2024  Care impacted by chronic illness:Other: Bipolar 1 disorder, borderline personality disorder, depressive disorder, RAMON, history of suicide attempt, ADHD, seizure, intellectual disability  Care significantly affected by social determinants of health:N/A  Did you consider but not order tests?: Work up considered but not performed and documented in chart, if applicable  Did you interpret images independently?: Independent interpretation of ECG and images noted in documentation, when applicable.  Consultation discussion with other provider:Did you involve another provider (consultant, , pharmacy, etc.)?: No  Discharge. No recommendations on prescription strength medication(s). See documentation for any additional details.    This patient involved a high degree of complexity in medical decision making, as significant risks were present and assessed. Recent encounters & results in medical record reviewed by me.     All workup (i.e. any EKG/labs/imaging as per charting below) reviewed and independently interpreted by me. See respective sections for details.       minutes of critical care time     MEDICATIONS GIVEN IN THE EMERGENCY:  Medications - No data to display    NEW PRESCRIPTIONS STARTED AT TODAY'S ER VISIT  Discharge Medication List as of 5/31/2024  4:08 PM             =================================================================    HPI    Patient information was obtained from: patient     Use of : N/A         Sadaf Ross is a 23 year old female with a pertinent history of Bipolar 1 disorder, borderline personality disorder, depressive disorder, RAMON, history of suicide attempt, ADHD, seizure, intellectual disabilit who presents for evaluation of depression.    Per chart review:  Patient was seen at [facility] emergency care center on 5/31/2024 for anxiety and  palpitations.EKG showed sinus rhythm and troponins were unremarkable.  Declined Vistaril and would like to go back to her group home so she was discharged.  Patient has been seen at the emergency department 37 times since 5/3/2024.  Most for anxiety symptoms and suicidal thoughts.    Patient reports she has been very depressed lately and not been able to do her daily activities due to sleeping and not been able to eat.  Was seen at 3 AM this morning due to anxiety at [facility].  Does see a counselor weekly.    REVIEW OF SYSTEMS   See HPI     PAST MEDICAL HISTORY:  Past Medical History:   Diagnosis Date    ADHD (attention deficit hyperactivity disorder)     Bipolar 1 disorder (H)     Borderline personality disorder (H)     Depressive disorder     Intellectual disability     Obesity     Syncope        PAST SURGICAL HISTORY:  Past Surgical History:   Procedure Laterality Date    APPENDECTOMY             CURRENT MEDICATIONS:    acetaminophen (TYLENOL) 325 MG tablet  ARIPiprazole lauroxil ER (ARISTADA) 882 MG/3.2ML intra-muscular  bisacodyl (DULCOLAX) 5 MG EC tablet  cloNIDine (CATAPRES) 0.1 MG tablet  dicyclomine (BENTYL) 20 MG tablet  divalproex sodium extended-release (DEPAKOTE ER) 250 MG 24 hr tablet  docusate sodium (COLACE) 50 MG capsule  famotidine (PEPCID) 20 MG tablet  FLUoxetine (PROZAC) 40 MG capsule  hydrOXYzine (ATARAX) 50 MG tablet  loperamide (IMODIUM A-D) 2 MG tablet  OLANZapine zydis (ZYPREXA) 5 MG ODT  ondansetron (ZOFRAN) 4 MG tablet  pantoprazole (PROTONIX) 40 MG EC tablet  promethazine (PHENERGAN) 12.5 MG tablet  senna-docusate (SENOKOT-S/PERICOLACE) 8.6-50 MG tablet  sulfamethoxazole-trimethoprim (BACTRIM DS) 800-160 MG tablet  traZODone (DESYREL) 50 MG tablet  Vitamin D, Cholecalciferol, 25 MCG (1000 UT) TABS        ALLERGIES:  Allergies   Allergen Reactions    Penicillins Rash and Unknown       FAMILY HISTORY:  Family History   Problem Relation Age of Onset    Diabetes Type 1 Father      Cancer Paternal Grandfather        SOCIAL HISTORY:   Social History     Socioeconomic History    Marital status: Single   Tobacco Use    Smoking status: Some Days     Current packs/day: 0.25     Average packs/day: 0.3 packs/day for 5.0 years (1.3 ttl pk-yrs)     Types: Cigarettes, Vaping Device    Smokeless tobacco: Former   Substance and Sexual Activity    Alcohol use: No    Drug use: No    Sexual activity: Not Currently     Partners: Female, Male     Birth control/protection: Injection     Social Determinants of Health     Financial Resource Strain: At Risk (2021)    Received from [facility]     Financial Resource Strain     Financial Resource Strain: 2   Food Insecurity: At Risk (2021)    Received from [facility]     Food Insecurity     Food: 2   Transportation Needs: At Risk (2021)    Received from [facility]     Transportation Needs     Transportation: 2   Physical Activity: Not on File (2020)    Received from [facility]     Physical Activity     Physical Activity: 0   Stress: Not at Risk (2021)    Received from [facility]     Stress     Stress: 1   Social Connections: Not at Risk (2021)    Received from [facility]     Social Connections     Social Connections and Isolation: 1   Interpersonal Safety: Not At Risk (2024)    Received from [facility]     Humiliation, Afraid, Rape, and Kick questionnaire     Fear of Current or Ex-Partner: No     Emotionally Abused: No     Physically Abused: No     Sexually Abused: No   Housing Stability: Not at Risk (2021)    Received from [facility]     Housing Stability     Housin    PHYSICAL EXAM    VITAL SIGNS: /70   Pulse 82   Temp 98.5  F (36.9  C) (Oral)   Resp 20   LMP  (LMP Unknown)   SpO2 98%   Constitutional:  Well developed, well nourished,   EYES: Conjunctivae clear, no discharge  HENT: Atraumatic, normocephalic, bilateral external ears normal.  Oropharynx moist. Nose normal.   Neck:  Normal ROM , Supple   Respiratory:  No respiratory distress, normal nonlabored respirations.   Cardiovascular:  Distal perfusion appears intact  Musculoskeletal:  No edema appreciated, No cyanosis, No clubbing. Good range of motion in all major joints.   Integument:  Warm, Dry, No erythema, No rash.   Neurologic:  Alert and oriented. No focal deficits noted.  Ambulatory  Psychiatric:  Affect normal        LAB:  All pertinent labs reviewed and interpreted.  Labs Ordered and Resulted from Time of ED Arrival to Time of ED Departure - No data to display    RADIOLOGY:  Reviewed all pertinent imaging. Please see official radiology report.  No orders to display       IErica, am serving as a scribe to document services personally performed by Dr. Joel Miller based on my observation and the provider's statements to me. IJoel MD attest that Erica Currie is acting in a scribe capacity, has observed my performance of the services and has documented them in accordance with my direction.    Joel Miller M.D.  Emergency Medicine  [facility] EMERGENCY DEPARTMENT  [facility] [facility] [id number] [phone]  Dept: [phone]       Joel Miller MD  05/31/24 3149

## 2024-06-05 ENCOUNTER — HOSPITAL ENCOUNTER (EMERGENCY)
Facility: CLINIC | Age: 24
Discharge: HOME | End: 2024-06-05
Attending: INTERNAL MEDICINE | Admitting: INTERNAL MEDICINE
Payer: MEDICARE

## 2024-06-05 VITALS
BODY MASS INDEX: 44.12 KG/M2 | DIASTOLIC BLOOD PRESSURE: 74 MMHG | HEART RATE: 86 BPM | OXYGEN SATURATION: 100 % | TEMPERATURE: 98.1 F | RESPIRATION RATE: 16 BRPM | WEIGHT: 249 LBS | HEIGHT: 63 IN | SYSTOLIC BLOOD PRESSURE: 107 MMHG

## 2024-06-05 DIAGNOSIS — R30.0 DYSURIA: ICD-10-CM

## 2024-06-05 LAB
ALBUMIN UR-MCNC: 10 MG/DL
APPEARANCE UR: CLEAR
BILIRUB UR QL STRIP: NEGATIVE
COLOR UR AUTO: ABNORMAL
GLUCOSE UR STRIP-MCNC: NEGATIVE MG/DL
HCG UR QL: NEGATIVE
HGB UR QL STRIP: ABNORMAL
KETONES UR STRIP-MCNC: NEGATIVE MG/DL
LEUKOCYTE ESTERASE UR QL STRIP: NEGATIVE
MUCOUS THREADS #/AREA URNS LPF: PRESENT /LPF
NITRATE UR QL: NEGATIVE
PH UR STRIP: 6 [PH] (ref 5–7)
RBC URINE: 5 /HPF
SP GR UR STRIP: 1.03 (ref 1–1.03)
SQUAMOUS EPITHELIAL: 4 /HPF
TRANSITIONAL EPI: <1 /HPF
URATE CRY #/AREA URNS HPF: ABNORMAL /HPF
UROBILINOGEN UR STRIP-MCNC: NORMAL MG/DL
WBC URINE: 2 /HPF

## 2024-06-05 PROCEDURE — 99283 EMERGENCY DEPT VISIT LOW MDM: CPT | Performed by: INTERNAL MEDICINE

## 2024-06-05 PROCEDURE — 81025 URINE PREGNANCY TEST: CPT | Performed by: INTERNAL MEDICINE

## 2024-06-05 PROCEDURE — 81001 URINALYSIS AUTO W/SCOPE: CPT | Performed by: INTERNAL MEDICINE

## 2024-06-05 ASSESSMENT — ACTIVITIES OF DAILY LIVING (ADL)
ADLS_ACUITY_SCORE: 39
ADLS_ACUITY_SCORE: 39

## 2024-06-05 NOTE — ED PROVIDER NOTES
[facility] EMERGENCY DEPARTMENT ([facility])    6/05/24      ED PROVIDER NOTE    History     Chief Complaint   Patient presents with    Urinary Frequency     Pain when urinating and cleaning vagina. Pt states she has been on abx since May 27.     The history is provided by the patient and medical records.     Sadaf Ross is a 23 year old female with a history of depression, anxiety, PTSD, bipolar, borderline personality disorder, chronic SI/SIB, and frequent emergency department visits presents to the emergency department due to UTI symptoms.     Patient reports burning with urination. She denies back pain, fever.     Per chart review, she was seen on 05/27/24 in [facility] ED with nausea, vomiting and increased urinary frequency, burning with urination and urgency. UA concerning for infection. She was given 10 days of Bactrim for this.        Past Medical History  Past Medical History:   Diagnosis Date    ADHD (attention deficit hyperactivity disorder)     Bipolar 1 disorder (H)     Borderline personality disorder (H)     Depressive disorder     Intellectual disability     Obesity     Syncope      Past Surgical History:   Procedure Laterality Date    APPENDECTOMY       acetaminophen (TYLENOL) 325 MG tablet  ARIPiprazole lauroxil ER (ARISTADA) 882 MG/3.2ML intra-muscular  bisacodyl (DULCOLAX) 5 MG EC tablet  cloNIDine (CATAPRES) 0.1 MG tablet  dicyclomine (BENTYL) 20 MG tablet  divalproex sodium extended-release (DEPAKOTE ER) 250 MG 24 hr tablet  docusate sodium (COLACE) 50 MG capsule  famotidine (PEPCID) 20 MG tablet  FLUoxetine (PROZAC) 40 MG capsule  hydrOXYzine (ATARAX) 50 MG tablet  loperamide (IMODIUM A-D) 2 MG tablet  OLANZapine zydis (ZYPREXA) 5 MG ODT  ondansetron (ZOFRAN) 4 MG tablet  pantoprazole (PROTONIX) 40 MG EC tablet  promethazine (PHENERGAN) 12.5 MG tablet  senna-docusate (SENOKOT-S/PERICOLACE) 8.6-50 MG tablet  sulfamethoxazole-trimethoprim (BACTRIM DS) 800-160 MG tablet  traZODone  "(DESYREL) 50 MG tablet  Vitamin D, Cholecalciferol, 25 MCG (1000 UT) TABS      Allergies   Allergen Reactions    Penicillins Rash and Unknown     Family History  Family History   Problem Relation Age of Onset    Diabetes Type 1 Father     Cancer Paternal Grandfather      Social History   Social History     Tobacco Use    Smoking status: Some Days     Current packs/day: 0.25     Average packs/day: 0.3 packs/day for 5.0 years (1.3 ttl pk-yrs)     Types: Cigarettes, Vaping Device    Smokeless tobacco: Former   Substance Use Topics    Alcohol use: No    Drug use: No      A medically appropriate review of systems was performed with pertinent positives and negatives noted in the HPI, and all other systems negative.    Physical Exam   BP: 107/74  Pulse: 86  Temp: 98.1  F (36.7  C)  Resp: 16  Height: 160 cm (5' 3\")  Weight: 112.9 kg (249 lb)  SpO2: 100 %  Physical Exam  Vitals and nursing note reviewed.   Constitutional:       General: She is not in acute distress.     Appearance: She is not diaphoretic.   HENT:      Head: Normocephalic and atraumatic.   Eyes:      Extraocular Movements: Extraocular movements intact.      Conjunctiva/sclera: Conjunctivae normal.   Cardiovascular:      Rate and Rhythm: Normal rate and regular rhythm.      Heart sounds: Normal heart sounds. No murmur heard.     No friction rub. No gallop.   Pulmonary:      Effort: Pulmonary effort is normal. No respiratory distress.      Breath sounds: Normal breath sounds. No stridor. No wheezing, rhonchi or rales.   Chest:      Chest wall: No tenderness.   Abdominal:      General: Abdomen is flat. Bowel sounds are normal. There is no distension.      Palpations: Abdomen is soft. There is no mass.      Tenderness: There is no abdominal tenderness. There is no right CVA tenderness, left CVA tenderness, guarding or rebound.      Hernia: No hernia is present.   Musculoskeletal:         General: No tenderness. Normal range of motion.      Cervical back: Normal " range of motion and neck supple.   Skin:     General: Skin is warm.      Findings: No rash.           ED Course, Procedures, & Data      Procedures            Results for orders placed or performed during the hospital encounter of 06/05/24   UA with Microscopic reflex to Culture     Status: Abnormal    Specimen: Urine, Clean Catch   Result Value Ref Range    Color Urine Light Yellow Colorless, Straw, Light Yellow, Yellow    Appearance Urine Clear Clear    Glucose Urine Negative Negative mg/dL    Bilirubin Urine Negative Negative    Ketones Urine Negative Negative mg/dL    Specific Gravity Urine 1.034 1.003 - 1.035    Blood Urine Trace (A) Negative    pH Urine 6.0 5.0 - 7.0    Protein Albumin Urine 10 (A) Negative mg/dL    Urobilinogen Urine Normal Normal, 2.0 mg/dL    Nitrite Urine Negative Negative    Leukocyte Esterase Urine Negative Negative    Mucus Urine Present (A) None Seen /LPF    Uric Acid Crystals Urine Few (A) None Seen /HPF    RBC Urine 5 (H) <=2 /HPF    WBC Urine 2 <=5 /HPF    Squamous Epithelials Urine 4 (H) <=1 /HPF    Transitional Epithelials Urine <1 <=1 /HPF    Narrative    Urine Culture not indicated   HCG qualitative urine (UPT)     Status: Normal   Result Value Ref Range    hCG Urine Qualitative Negative Negative     Medications - No data to display  Labs Ordered and Resulted from Time of ED Arrival to Time of ED Departure   ROUTINE UA WITH MICROSCOPIC REFLEX TO CULTURE - Abnormal       Result Value    Color Urine Light Yellow      Appearance Urine Clear      Glucose Urine Negative      Bilirubin Urine Negative      Ketones Urine Negative      Specific Gravity Urine 1.034      Blood Urine Trace (*)     pH Urine 6.0      Protein Albumin Urine 10 (*)     Urobilinogen Urine Normal      Nitrite Urine Negative      Leukocyte Esterase Urine Negative      Mucus Urine Present (*)     Uric Acid Crystals Urine Few (*)     RBC Urine 5 (*)     WBC Urine 2      Squamous Epithelials Urine 4 (*)      Transitional Epithelials Urine <1     HCG QUALITATIVE URINE - Normal    hCG Urine Qualitative Negative       No orders to display          Critical care was not performed.     Medical Decision Making  The patient's presentation was of moderate complexity (a chronic illness mild to moderate exacerbation, progression, or side effect of treatment).    The patient's evaluation involved:  ordering and/or review of 2 test(s) in this encounter (see separate area of note for details)    The patient's management necessitated only low risk treatment.    Assessment & Plan    Recurrent dysuria, urinanalysis and UPT neg, reassured and encouraged to push fluids possibly cranberry juice and follow up with her PMD.    I have reviewed the nursing notes. I have reviewed the findings, diagnosis, plan and need for follow up with the patient.    Discharge Medication List as of 6/5/2024  5:58 PM          Final diagnoses:   Dysuria       Florina Dudley MD  [facility] EMERGENCY DEPARTMENT  6/5/2024        Florina Dudley MD  06/06/24 0007

## 2024-06-05 NOTE — DISCHARGE INSTRUCTIONS
Please push fluids, possibly cranberry juice and make an appointment to follow up with Your Primary Care Provider in 3-7 days if you have any concerns.

## 2024-06-05 NOTE — ED TRIAGE NOTES
Pt states she was seen at [place] May 27 for UTI. Abx were started at that time. Pt states she is still having burning when urinating and pain hen wiping.

## 2024-06-06 ENCOUNTER — HOSPITAL ENCOUNTER (EMERGENCY)
Facility: CLINIC | Age: 24
Discharge: HOME | End: 2024-06-06
Admitting: EMERGENCY MEDICINE
Payer: MEDICARE

## 2024-06-06 VITALS
HEART RATE: 79 BPM | SYSTOLIC BLOOD PRESSURE: 106 MMHG | DIASTOLIC BLOOD PRESSURE: 67 MMHG | BODY MASS INDEX: 44.12 KG/M2 | RESPIRATION RATE: 16 BRPM | HEIGHT: 63 IN | OXYGEN SATURATION: 99 % | TEMPERATURE: 98 F | WEIGHT: 249 LBS

## 2024-06-06 DIAGNOSIS — R11.2 NAUSEA AND VOMITING, UNSPECIFIED VOMITING TYPE: ICD-10-CM

## 2024-06-06 PROCEDURE — 99283 EMERGENCY DEPT VISIT LOW MDM: CPT | Performed by: EMERGENCY MEDICINE

## 2024-06-06 ASSESSMENT — COLUMBIA-SUICIDE SEVERITY RATING SCALE - C-SSRS
2. HAVE YOU ACTUALLY HAD ANY THOUGHTS OF KILLING YOURSELF IN THE PAST MONTH?: YES
2. HAVE YOU ACTUALLY HAD ANY THOUGHTS OF KILLING YOURSELF IN THE PAST MONTH?: NO
6. HAVE YOU EVER DONE ANYTHING, STARTED TO DO ANYTHING, OR PREPARED TO DO ANYTHING TO END YOUR LIFE?: YES
BASED ON RESPONSES TO C-SSRS QS 1-6, WHAT IS THE PATIENT'S OVERALL RISK RATING FOR SUICIDE: NO RISKS INDICATED
6. HAVE YOU EVER DONE ANYTHING, STARTED TO DO ANYTHING, OR PREPARED TO DO ANYTHING TO END YOUR LIFE?: YES
1. IN THE PAST MONTH, HAVE YOU WISHED YOU WERE DEAD OR WISHED YOU COULD GO TO SLEEP AND NOT WAKE UP?: NO

## 2024-06-06 ASSESSMENT — ACTIVITIES OF DAILY LIVING (ADL): ADLS_ACUITY_SCORE: 39

## 2024-06-06 NOTE — ED TRIAGE NOTES
Triage Assessment (Adult)       Row Name 06/06/24 0638          Triage Assessment    Airway WDL WDL        Respiratory WDL    Respiratory WDL WDL        Skin Circulation/Temperature WDL    Skin Circulation/Temperature WDL WDL        Cardiac WDL    Cardiac WDL WDL        Peripheral/Neurovascular WDL    Peripheral Neurovascular WDL WDL        Cognitive/Neuro/Behavioral WDL    Cognitive/Neuro/Behavioral WDL WDL

## 2024-06-06 NOTE — ED NOTES
Discharge paper work given to patient, opted to call [employer] by themselves, left the facility at 7:30pm.

## 2024-06-06 NOTE — ED PROVIDER NOTES
ED Provider Note  [facility]      History     Chief Complaint   Patient presents with    Nausea, Vomiting, & Diarrhea     Pt vomited x 3 around 0430. Started on antibiotics for UTI around may 27th.      HPI  Sadaf Ross is a 23 year old female who presents to the emergency department for nausea and vomiting.  Patient reports that she has multiple episodes of vomiting this is a chronic problem for her.  She is seen frequently in this ER for this.  Per chart review she was seen yesterday for some dysuria in the recent setting of UTI though urine at that time was not infectious.  She still has a couple of days of Bactrim left.    When I arrived to the emergency department she was requesting discharge.  She reports her nausea and vomiting are improved.  On my evaluation she denies abdominal pain, fever, flank pain and reports that her dysuria for which she was seen yesterday for 4 is improved.    Past Medical History  Past Medical History:   Diagnosis Date    ADHD (attention deficit hyperactivity disorder)     Bipolar 1 disorder (H)     Borderline personality disorder (H)     Depressive disorder     Intellectual disability     Obesity     Syncope      Past Surgical History:   Procedure Laterality Date    APPENDECTOMY       acetaminophen (TYLENOL) 325 MG tablet  ARIPiprazole lauroxil ER (ARISTADA) 882 MG/3.2ML intra-muscular  bisacodyl (DULCOLAX) 5 MG EC tablet  cloNIDine (CATAPRES) 0.1 MG tablet  dicyclomine (BENTYL) 20 MG tablet  divalproex sodium extended-release (DEPAKOTE ER) 250 MG 24 hr tablet  docusate sodium (COLACE) 50 MG capsule  famotidine (PEPCID) 20 MG tablet  FLUoxetine (PROZAC) 40 MG capsule  hydrOXYzine (ATARAX) 50 MG tablet  loperamide (IMODIUM A-D) 2 MG tablet  OLANZapine zydis (ZYPREXA) 5 MG ODT  ondansetron (ZOFRAN) 4 MG tablet  pantoprazole (PROTONIX) 40 MG EC tablet  promethazine (PHENERGAN) 12.5 MG tablet  senna-docusate (SENOKOT-S/PERICOLACE) 8.6-50 MG  "tablet  sulfamethoxazole-trimethoprim (BACTRIM DS) 800-160 MG tablet  traZODone (DESYREL) 50 MG tablet  Vitamin D, Cholecalciferol, 25 MCG (1000 UT) TABS      Allergies   Allergen Reactions    Penicillins Rash and Unknown     Family History  Family History   Problem Relation Age of Onset    Diabetes Type 1 Father     Cancer Paternal Grandfather      Social History   Social History     Tobacco Use    Smoking status: Some Days     Current packs/day: 0.25     Average packs/day: 0.3 packs/day for 5.0 years (1.3 ttl pk-yrs)     Types: Cigarettes, Vaping Device    Smokeless tobacco: Former   Substance Use Topics    Alcohol use: No    Drug use: No      A medically appropriate review of systems was performed with pertinent positives and negatives noted in the HPI, and all other systems negative.    Physical Exam   BP: 106/67  Pulse: 79  Temp: 98  F (36.7  C)  Resp: 16  Height: 160 cm (5' 3\")  Weight: 112.9 kg (249 lb)  SpO2: 99 %  Physical Exam  General: awake, alert, NAD  Neck: Supple  CV: regular rate  Lungs: clear to auscultation  Abd: soft, non-tender, no guarding, no peritoneal signs  Back: No flank tenderness.   EXT: lower extremities without swelling or edema  Neuro: awake, answers questions appropriately. No focal deficits noted       ED Course, Procedures, & Data      Procedures       No results found for any visits on 06/06/24.  Medications - No data to display  Labs Ordered and Resulted from Time of ED Arrival to Time of ED Departure - No data to display  No orders to display          Critical care was not performed.     Medical Decision Making  The patient's presentation was of low complexity (a stable chronic illness).    The patient's evaluation involved:  review of 1 test result(s) ordered prior to this encounter (see separate area of note for details)    The patient's management necessitated only low risk treatment.    Assessment & Plan    On exam she is well-appearing, nontoxic, normal vital signs.  She is " not vomiting.  She is requesting discharge.  I did review her urine studies from yesterday which are not consistent with infection.  She also endorses that her dysuria has improved.  She has no abdominal pain, no abdominal tenderness, no flank pain no flank tenderness no fever will discharge her per her request.    I have reviewed the nursing notes. I have reviewed the findings, diagnosis, plan and need for follow up with the patient.    New Prescriptions    No medications on file       Final diagnoses:   Nausea and vomiting, unspecified vomiting type         [facility] EMERGENCY DEPARTMENT  6/6/2024     Bernardino Schwarz MD  06/06/24 0763

## 2024-06-09 ENCOUNTER — HOSPITAL ENCOUNTER (EMERGENCY)
Facility: CLINIC | Age: 24
Discharge: HOME | End: 2024-06-09
Attending: EMERGENCY MEDICINE | Admitting: EMERGENCY MEDICINE
Payer: MEDICARE

## 2024-06-09 ENCOUNTER — NURSE TRIAGE (OUTPATIENT)
Dept: NURSING | Facility: CLINIC | Age: 24
End: 2024-06-09

## 2024-06-09 VITALS
DIASTOLIC BLOOD PRESSURE: 81 MMHG | OXYGEN SATURATION: 99 % | SYSTOLIC BLOOD PRESSURE: 141 MMHG | TEMPERATURE: 98.8 F | HEART RATE: 87 BPM

## 2024-06-09 VITALS
RESPIRATION RATE: 18 BRPM | SYSTOLIC BLOOD PRESSURE: 93 MMHG | TEMPERATURE: 97.9 F | OXYGEN SATURATION: 98 % | DIASTOLIC BLOOD PRESSURE: 59 MMHG | HEART RATE: 79 BPM

## 2024-06-09 DIAGNOSIS — R11.2 NAUSEA AND VOMITING, UNSPECIFIED VOMITING TYPE: ICD-10-CM

## 2024-06-09 DIAGNOSIS — R30.0 DYSURIA: ICD-10-CM

## 2024-06-09 LAB
ALBUMIN UR-MCNC: NEGATIVE MG/DL
APPEARANCE UR: CLEAR
BILIRUB UR QL STRIP: NEGATIVE
COLOR UR AUTO: ABNORMAL
GLUCOSE UR STRIP-MCNC: NEGATIVE MG/DL
HCG UR QL: NEGATIVE
HGB UR QL STRIP: NEGATIVE
KETONES UR STRIP-MCNC: NEGATIVE MG/DL
LEUKOCYTE ESTERASE UR QL STRIP: NEGATIVE
MUCOUS THREADS #/AREA URNS LPF: PRESENT /LPF
NITRATE UR QL: NEGATIVE
PH UR STRIP: 6.5 [PH] (ref 5–7)
RBC URINE: 2 /HPF
SP GR UR STRIP: 1.02 (ref 1–1.03)
SQUAMOUS EPITHELIAL: 7 /HPF
UROBILINOGEN UR STRIP-MCNC: NORMAL MG/DL
WBC URINE: 2 /HPF

## 2024-06-09 PROCEDURE — 81001 URINALYSIS AUTO W/SCOPE: CPT | Performed by: EMERGENCY MEDICINE

## 2024-06-09 PROCEDURE — 99283 EMERGENCY DEPT VISIT LOW MDM: CPT | Performed by: EMERGENCY MEDICINE

## 2024-06-09 PROCEDURE — 81025 URINE PREGNANCY TEST: CPT | Performed by: EMERGENCY MEDICINE

## 2024-06-09 PROCEDURE — 250N000013 HC RX MED GY IP 250 OP 250 PS 637: Performed by: EMERGENCY MEDICINE

## 2024-06-09 RX ORDER — HYDROXYZINE HYDROCHLORIDE 25 MG/1
25 TABLET, FILM COATED ORAL EVERY 6 HOURS PRN
Status: DISCONTINUED | OUTPATIENT
Start: 2024-06-09 | End: 2024-06-09 | Stop reason: HOSPADM

## 2024-06-09 RX ORDER — HYDROXYZINE HYDROCHLORIDE 50 MG/1
50 TABLET, FILM COATED ORAL EVERY 6 HOURS PRN
Status: DISCONTINUED | OUTPATIENT
Start: 2024-06-09 | End: 2024-06-09 | Stop reason: HOSPADM

## 2024-06-09 RX ADMIN — HYDROXYZINE HYDROCHLORIDE 25 MG: 25 TABLET, FILM COATED ORAL at 16:48

## 2024-06-09 ASSESSMENT — ACTIVITIES OF DAILY LIVING (ADL)
ADLS_ACUITY_SCORE: 39

## 2024-06-09 NOTE — ED TRIAGE NOTES
Nausea at home, zofran not working.  Brought in by EMS from group home.     Triage Assessment (Adult)       Row Name 06/09/24 1506          Triage Assessment    Airway WDL WDL        Respiratory WDL    Respiratory WDL WDL        Skin Circulation/Temperature WDL    Skin Circulation/Temperature WDL WDL        Cardiac WDL    Cardiac WDL WDL        Peripheral/Neurovascular WDL    Peripheral Neurovascular WDL WDL        Cognitive/Neuro/Behavioral WDL    Cognitive/Neuro/Behavioral WDL WDL

## 2024-06-09 NOTE — ED PROVIDER NOTES
[facility] EMERGENCY DEPARTMENT ([facility])    6/09/24      ED PROVIDER NOTE       History     Chief Complaint   Patient presents with    Nausea     Nausea, zofran not working     The history is provided by the patient and medical records.     Sadaf Ross is a 23 year old female with a history of anxiety and recurrent nausea/vomiting who presents to the ED for nausea and vomiting. Patient reports that she has tried zofran, petpo bismol, an ice pack, and a heating pad which did not help relieve her nausea. She says she had two episodes of vomiting yesterday. Patient says she notices symptoms of nausea and vomiting appear when she is especially stressed and/or anxious.     Past Medical History  Past Medical History:   Diagnosis Date    ADHD (attention deficit hyperactivity disorder)     Bipolar 1 disorder (H)     Borderline personality disorder (H)     Depressive disorder     Intellectual disability     Obesity     Syncope      Past Surgical History:   Procedure Laterality Date    APPENDECTOMY       acetaminophen (TYLENOL) 325 MG tablet  ARIPiprazole lauroxil ER (ARISTADA) 882 MG/3.2ML intra-muscular  bisacodyl (DULCOLAX) 5 MG EC tablet  cloNIDine (CATAPRES) 0.1 MG tablet  dicyclomine (BENTYL) 20 MG tablet  divalproex sodium extended-release (DEPAKOTE ER) 250 MG 24 hr tablet  docusate sodium (COLACE) 50 MG capsule  famotidine (PEPCID) 20 MG tablet  FLUoxetine (PROZAC) 40 MG capsule  hydrOXYzine (ATARAX) 50 MG tablet  loperamide (IMODIUM A-D) 2 MG tablet  OLANZapine zydis (ZYPREXA) 5 MG ODT  ondansetron (ZOFRAN) 4 MG tablet  pantoprazole (PROTONIX) 40 MG EC tablet  promethazine (PHENERGAN) 12.5 MG tablet  senna-docusate (SENOKOT-S/PERICOLACE) 8.6-50 MG tablet  traZODone (DESYREL) 50 MG tablet  Vitamin D, Cholecalciferol, 25 MCG (1000 UT) TABS      Allergies   Allergen Reactions    Penicillins Rash and Unknown     Family History  Family History   Problem Relation Age of Onset    Diabetes Type 1 Father      Cancer Paternal Grandfather      Social History   Social History     Tobacco Use    Smoking status: Some Days     Current packs/day: 0.25     Average packs/day: 0.3 packs/day for 5.0 years (1.3 ttl pk-yrs)     Types: Cigarettes, Vaping Device    Smokeless tobacco: Former   Substance Use Topics    Alcohol use: No    Drug use: No      Past medical history, past surgical history, medications, allergies, family history, and social history were reviewed with the patient. No additional pertinent items.   A complete review of systems was performed with pertinent positives and negatives noted in the HPI, and all other systems negative.    Physical Exam   BP: (!) 141/81  Pulse: 87  Temp: 98.8  F (37.1  C)  SpO2: 99 %  Physical Exam  General: awake, alert, NAD  Head: normal cephalic  HEENT: pupils equal, conjugate gaze intact  Neck: Supple  CV: regular rate and rhythm without murmur  Lungs: clear to auscultation  Abd: soft, non-tender, no guarding, no peritoneal signs  EXT: lower extremities without swelling or edema  Neuro: awake, answers questions appropriately. No focal deficits noted      ED Course, Procedures, & Data      Procedures       Results for orders placed or performed during the hospital encounter of 06/09/24   HCG qualitative urine     Status: Normal   Result Value Ref Range    hCG Urine Qualitative Negative Negative   UA with Microscopic reflex to Culture     Status: Abnormal    Specimen: Urine, Clean Catch   Result Value Ref Range    Color Urine Light Yellow Colorless, Straw, Light Yellow, Yellow    Appearance Urine Clear Clear    Glucose Urine Negative Negative mg/dL    Bilirubin Urine Negative Negative    Ketones Urine Negative Negative mg/dL    Specific Gravity Urine 1.019 1.003 - 1.035    Blood Urine Negative Negative    pH Urine 6.5 5.0 - 7.0    Protein Albumin Urine Negative Negative mg/dL    Urobilinogen Urine Normal Normal, 2.0 mg/dL    Nitrite Urine Negative Negative    Leukocyte Esterase Urine  Negative Negative    Mucus Urine Present (A) None Seen /LPF    RBC Urine 2 <=2 /HPF    WBC Urine 2 <=5 /HPF    Squamous Epithelials Urine 7 (H) <=1 /HPF    Narrative    Urine Culture not indicated     Medications   hydrOXYzine HCl (ATARAX) tablet 25 mg (25 mg Oral $Given 6/9/24 1648)     Or   hydrOXYzine HCl (ATARAX) tablet 50 mg ( Oral See Alternative 6/9/24 1648)     Labs Ordered and Resulted from Time of ED Arrival to Time of ED Departure - No data to display  No orders to display          Critical care was not performed.     Medical Decision Making  The patient's presentation was of low complexity (a stable chronic illness).    The patient's evaluation involved:  review of 2 test result(s) ordered prior to this encounter (see separate area of note for details)    The patient's management necessitated moderate risk (prescription drug management including medications given in the ED).    Assessment & Plan    Sadaf is a 23-year-old female well-known to this department who presents with nausea and vomiting.  This is a chronic problem for her.  She states that this is typical of her usual symptoms and there is no new or unusual features.  She has a benign abdomen, denies abdominal pain. She notes that she thinks Vistaril would be helpful since Zofran is not working.  Will give a dose of Vistaril here in the emergency department.    She was here yesterday urine studies were obtained that did not show signs of infection complaint of dysuria at that time. urine was also negative for ketones.  Patient notes that she is only vomited 1 time today so do not think we need to draw labs for electrolyte abnormalities excetra given a single episode.    On reassessment patient reports symptoms had improved after Vistaril and is requesting discharge.  Will discharge per her request.    I have reviewed the nursing notes. I have reviewed the findings, diagnosis, plan and need for follow up with the patient.      New Prescriptions     No medications on file       Final diagnoses:   Nausea and vomiting, unspecified vomiting type   I, Aretha Castano, am serving as a trained medical scribe to document services personally performed by Bernardino Schwarz MD, based on the provider's statements to me.     I, Bernardino Schwarz MD, was physically present and have reviewed and verified the accuracy of this note documented by Aertha Castano.     Bernardino Schwarz MD  [facility] EMERGENCY DEPARTMENT  6/9/2024     Bernardino Schwarz MD  06/09/24 4615

## 2024-06-09 NOTE — ED PROVIDER NOTES
History     Chief Complaint   Patient presents with    UTI     Pt c/o pain with urination     HPI  Sadaf Ross is a 23 year old female with PMH notable for very frequent ED presentations (> 30 in the past month), bipolar affective disorder, borderline PD  who presents to the ED with dysuria.  Patient ports symptoms for about 2 days.  No flank pain.  No abdominal pain.  Patient reports 1 loose stool this past day.  She endorses chronic nausea, had a couple episodes of vomiting this past day which she states is not unusual for her.  She has Pepto-Bismol and ondansetron at home which she uses for symptoms.  She reports recently finishing antibiotics for UTI but that the stinging sensation has continued.    Physical Exam   BP: 126/84  Pulse: 95  Temp: 98  F (36.7  C)  Resp: 15  SpO2: 99 %    Physical Exam  General: no acute distress. Appears stated age.   HENT: MMM, no oropharyngeal lesions  Eyes: PERRL, normal sclerae   Cardio: Regular rate. Regular rhythm. Extremities well perfused  Resp: Normal work of breathing, Normal respiratory rate.   Abdomen: no tenderness, non-distended, no rebound, no guarding.  No CVA tenderness  Neuro: alert without signs of confusion. CN II-XII grossly intact. Grossly normal strength and sensation in all extremities.   Psych: normal affect, normal behavior      ED Course      Procedures              Labs Ordered and Resulted from Time of ED Arrival to Time of ED Departure   ROUTINE UA WITH MICROSCOPIC REFLEX TO CULTURE - Abnormal       Result Value    Color Urine Light Yellow      Appearance Urine Clear      Glucose Urine Negative      Bilirubin Urine Negative      Ketones Urine Negative      Specific Gravity Urine 1.019      Blood Urine Negative      pH Urine 6.5      Protein Albumin Urine Negative      Urobilinogen Urine Normal      Nitrite Urine Negative      Leukocyte Esterase Urine Negative      Mucus Urine Present (*)     RBC Urine 2      WBC Urine 2      Squamous Epithelials  Urine 7 (*)    HCG QUALITATIVE URINE - Normal    hCG Urine Qualitative Negative       No orders to display        Medical Decision Making  The patient's presentation was of low complexity (an acute and uncomplicated illness or injury).    The patient's evaluation involved:  review of external note(s) from 2 sources ([facility] ED 5/27/2024, [place] ED 5/31/2024)  ordering and/or review of 2 test(s) in this encounter (see separate area of note for details)    The patient's management necessitated only low risk treatment.      Assessments & Plan   Patient presenting with dysuria. Vitals in the ED unremarkable. Nursing notes reviewed.     Chart review notable for multiple ED visits recently notes were reviewed from other encounters.  The visit from 5/27/2024 at [facility] ED was notable for diagnosis of UTI and prescription of Bactrim consistent with patient report.  Other ED visits have had broad workups but unremarkable findings.    Tonight, UA is without evidence of UTI.  UPT negative.    The complete clinical picture is most consistent with dysuria. After counseling on the diagnosis, work-up, and treatment plan, the patient was discharged to home. The patient was advised to follow-up with primary care in a few days. The patient was advised to return to the ED if worsening symptoms, or any urgent health concerns.     Final diagnoses:   Dysuria     New Prescriptions    No medications on file     --  Kunal Manriquez MD   Emergency Medicine   [facility] EMERGENCY DEPARTMENT  6/9/2024       Kunal Manriquez MD  06/09/24 6234

## 2024-06-09 NOTE — DISCHARGE INSTRUCTIONS
Instructions from your doctor today:  Emergency Department (ED) testing is focused on the potential causes of your symptoms that are the most dangerous possibilities, and cannot cover every possibility. Based on the evaluation, it was deemed sufficiently safe to discharge and continue management through the clinics. Thus, follow-up is very important to assess for improvement/worsening, potential further testing, and potential treatment adjustments. If you were given opioid pain medications or other medications that can make you drowsy while in the ED, you should not drive for at least several hours and not until you feel completely back to normal.     Please make an appointment to follow up with:  - Your Primary Care Provider in 3-5 days  - If you do not have a primary care provider, you can be seen in follow-up and establish care by calling any of the clinics below:     - [facility] (phone: [phone])     - Primary Care / [facility] (phone: [phone])   - Have your clinic provider review the results from today's visit with you again, including any potential follow-up or additional testing that may be needed based on the results. Occasionally, incidental findings are found on later review by radiologists that may need follow-up.     Return to the Emergency Department immediately if you have significantly worsening symptoms, or any other urgent health concerns.

## 2024-06-09 NOTE — ED TRIAGE NOTES
BIBA    Pt comes in complaining of pain after urination. Pt completed her antibiotics for a UTI today and symptoms continue. Pt also complains of persistent N/V.

## 2024-06-10 NOTE — TELEPHONE ENCOUNTER
"Pt calling with concerns for dizziness. Pt states she was seen in the ER earlier today for vomiting and another time for diarrhea. It appears pt has an ED plan as well as a \"flagged response\" per 911. Pt states she is too weak to stand and feels like she is going to pass out. She is also having visual changes. 911 called who stated they would send someone over.    Reason for Disposition   Shock suspected (e.g., cold/pale/clammy skin, too weak to stand, low BP, rapid pulse)    Additional Information   Negative: Sounds like a life-threatening emergency to the triager   Negative: Difficult to awaken or acting confused (e.g., disoriented, slurred speech)   Negative: SEVERE difficulty breathing (e.g., struggling for each breath, speaks in single words)    Protocols used: Dizziness - [id number]    Day Esteves RN  [facility] Nurse Advisor   6/9/2024  7:32 PM  "

## 2024-06-11 ENCOUNTER — HOSPITAL ENCOUNTER (EMERGENCY)
Facility: CLINIC | Age: 24
Discharge: HOME | End: 2024-06-11
Attending: EMERGENCY MEDICINE | Admitting: EMERGENCY MEDICINE
Payer: MEDICARE

## 2024-06-11 ENCOUNTER — HOSPITAL ENCOUNTER (EMERGENCY)
Facility: HOSPITAL | Age: 24
Discharge: HOME | End: 2024-06-11
Attending: FAMILY MEDICINE | Admitting: FAMILY MEDICINE
Payer: MEDICARE

## 2024-06-11 VITALS
OXYGEN SATURATION: 100 % | RESPIRATION RATE: 17 BRPM | BODY MASS INDEX: 44.11 KG/M2 | HEART RATE: 76 BPM | DIASTOLIC BLOOD PRESSURE: 68 MMHG | TEMPERATURE: 98.4 F | SYSTOLIC BLOOD PRESSURE: 117 MMHG | WEIGHT: 249 LBS

## 2024-06-11 VITALS
OXYGEN SATURATION: 98 % | RESPIRATION RATE: 16 BRPM | DIASTOLIC BLOOD PRESSURE: 83 MMHG | SYSTOLIC BLOOD PRESSURE: 165 MMHG | HEART RATE: 86 BPM | TEMPERATURE: 98.1 F

## 2024-06-11 DIAGNOSIS — R53.83 OTHER FATIGUE: ICD-10-CM

## 2024-06-11 DIAGNOSIS — E03.9 HYPOTHYROIDISM, UNSPECIFIED TYPE: ICD-10-CM

## 2024-06-11 DIAGNOSIS — R30.0 DYSURIA: ICD-10-CM

## 2024-06-11 LAB
ALBUMIN SERPL BCG-MCNC: 4.4 G/DL (ref 3.5–5.2)
ALBUMIN UR-MCNC: NEGATIVE MG/DL
ALP SERPL-CCNC: 68 U/L (ref 40–150)
ALT SERPL W P-5'-P-CCNC: 12 U/L (ref 0–50)
ANION GAP SERPL CALCULATED.3IONS-SCNC: 13 MMOL/L (ref 7–15)
APPEARANCE UR: CLEAR
AST SERPL W P-5'-P-CCNC: 14 U/L (ref 0–45)
BASOPHILS # BLD AUTO: 0.1 10E3/UL (ref 0–0.2)
BASOPHILS NFR BLD AUTO: 1 %
BILIRUB SERPL-MCNC: <0.2 MG/DL
BILIRUB UR QL STRIP: NEGATIVE
BUN SERPL-MCNC: 15.9 MG/DL (ref 6–20)
CALCIUM SERPL-MCNC: 9 MG/DL (ref 8.6–10)
CHLORIDE SERPL-SCNC: 104 MMOL/L (ref 98–107)
CLUE CELLS: ABNORMAL
COLOR UR AUTO: YELLOW
CREAT SERPL-MCNC: 0.73 MG/DL (ref 0.51–0.95)
DEPRECATED HCO3 PLAS-SCNC: 19 MMOL/L (ref 22–29)
EGFRCR SERPLBLD CKD-EPI 2021: >90 ML/MIN/1.73M2
EOSINOPHIL # BLD AUTO: 0.3 10E3/UL (ref 0–0.7)
EOSINOPHIL NFR BLD AUTO: 3 %
ERYTHROCYTE [DISTWIDTH] IN BLOOD BY AUTOMATED COUNT: 14 % (ref 10–15)
GLUCOSE SERPL-MCNC: 81 MG/DL (ref 70–99)
GLUCOSE UR STRIP-MCNC: NEGATIVE MG/DL
HCG UR QL: NEGATIVE
HCT VFR BLD AUTO: 34.5 % (ref 35–47)
HGB BLD-MCNC: 11.9 G/DL (ref 11.7–15.7)
HGB UR QL STRIP: NEGATIVE
HOLD SPECIMEN: NORMAL
IMM GRANULOCYTES # BLD: 0 10E3/UL
IMM GRANULOCYTES NFR BLD: 1 %
KETONES UR STRIP-MCNC: 40 MG/DL
LEUKOCYTE ESTERASE UR QL STRIP: NEGATIVE
LYMPHOCYTES # BLD AUTO: 3.1 10E3/UL (ref 0.8–5.3)
LYMPHOCYTES NFR BLD AUTO: 35 %
MCH RBC QN AUTO: 27.5 PG (ref 26.5–33)
MCHC RBC AUTO-ENTMCNC: 34.5 G/DL (ref 31.5–36.5)
MCV RBC AUTO: 80 FL (ref 78–100)
MONOCYTES # BLD AUTO: 0.6 10E3/UL (ref 0–1.3)
MONOCYTES NFR BLD AUTO: 6 %
MUCOUS THREADS #/AREA URNS LPF: PRESENT /LPF
NEUTROPHILS # BLD AUTO: 4.8 10E3/UL (ref 1.6–8.3)
NEUTROPHILS NFR BLD AUTO: 54 %
NITRATE UR QL: NEGATIVE
NRBC # BLD AUTO: 0 10E3/UL
NRBC BLD AUTO-RTO: 0 /100
PH UR STRIP: 5.5 [PH] (ref 5–7)
PLATELET # BLD AUTO: 245 10E3/UL (ref 150–450)
POTASSIUM SERPL-SCNC: 4.1 MMOL/L (ref 3.4–5.3)
PROT SERPL-MCNC: 7.1 G/DL (ref 6.4–8.3)
RBC # BLD AUTO: 4.32 10E6/UL (ref 3.8–5.2)
RBC URINE: 1 /HPF
SODIUM SERPL-SCNC: 136 MMOL/L (ref 135–145)
SP GR UR STRIP: 1.03 (ref 1–1.03)
SQUAMOUS EPITHELIAL: 3 /HPF
T4 FREE SERPL-MCNC: 0.85 NG/DL (ref 0.9–1.7)
TRICHOMONAS, WET PREP: ABNORMAL
TSH SERPL DL<=0.005 MIU/L-ACNC: 5.12 UIU/ML (ref 0.3–4.2)
UROBILINOGEN UR STRIP-MCNC: <2 MG/DL
WBC # BLD AUTO: 8.9 10E3/UL (ref 4–11)
WBC URINE: 3 /HPF
WBC'S/HIGH POWER FIELD, WET PREP: ABNORMAL
YEAST, WET PREP: ABNORMAL

## 2024-06-11 PROCEDURE — 250N000011 HC RX IP 250 OP 636: Performed by: EMERGENCY MEDICINE

## 2024-06-11 PROCEDURE — 93005 ELECTROCARDIOGRAM TRACING: CPT | Mod: RTG

## 2024-06-11 PROCEDURE — 85004 AUTOMATED DIFF WBC COUNT: CPT | Performed by: EMERGENCY MEDICINE

## 2024-06-11 PROCEDURE — 81001 URINALYSIS AUTO W/SCOPE: CPT | Performed by: FAMILY MEDICINE

## 2024-06-11 PROCEDURE — 36415 COLL VENOUS BLD VENIPUNCTURE: CPT | Performed by: EMERGENCY MEDICINE

## 2024-06-11 PROCEDURE — 99284 EMERGENCY DEPT VISIT MOD MDM: CPT | Performed by: EMERGENCY MEDICINE

## 2024-06-11 PROCEDURE — 82374 ASSAY BLOOD CARBON DIOXIDE: CPT | Performed by: EMERGENCY MEDICINE

## 2024-06-11 PROCEDURE — 87210 SMEAR WET MOUNT SALINE/INK: CPT | Performed by: EMERGENCY MEDICINE

## 2024-06-11 PROCEDURE — 81025 URINE PREGNANCY TEST: CPT | Performed by: FAMILY MEDICINE

## 2024-06-11 PROCEDURE — 84443 ASSAY THYROID STIM HORMONE: CPT | Performed by: EMERGENCY MEDICINE

## 2024-06-11 PROCEDURE — 250N000013 HC RX MED GY IP 250 OP 250 PS 637: Performed by: EMERGENCY MEDICINE

## 2024-06-11 PROCEDURE — 99283 EMERGENCY DEPT VISIT LOW MDM: CPT

## 2024-06-11 PROCEDURE — 84439 ASSAY OF FREE THYROXINE: CPT | Performed by: EMERGENCY MEDICINE

## 2024-06-11 RX ORDER — LEVOTHYROXINE SODIUM 25 UG/1
25 TABLET ORAL ONCE
Status: COMPLETED | OUTPATIENT
Start: 2024-06-11 | End: 2024-06-11

## 2024-06-11 RX ORDER — METRONIDAZOLE 500 MG/1
500 TABLET ORAL 2 TIMES DAILY
Qty: 14 TABLET | Refills: 0 | Status: SHIPPED | OUTPATIENT
Start: 2024-06-11 | End: 2024-06-18

## 2024-06-11 RX ORDER — ONDANSETRON 4 MG/1
4 TABLET, ORALLY DISINTEGRATING ORAL ONCE
Status: COMPLETED | OUTPATIENT
Start: 2024-06-11 | End: 2024-06-11

## 2024-06-11 RX ORDER — LEVOTHYROXINE SODIUM 25 UG/1
25 TABLET ORAL DAILY
Qty: 30 TABLET | Refills: 0 | Status: SHIPPED | OUTPATIENT
Start: 2024-06-11 | End: 2024-07-19

## 2024-06-11 RX ADMIN — ONDANSETRON 4 MG: 4 TABLET, ORALLY DISINTEGRATING ORAL at 21:04

## 2024-06-11 RX ADMIN — LEVOTHYROXINE SODIUM 25 MCG: 25 TABLET ORAL at 23:32

## 2024-06-11 ASSESSMENT — ACTIVITIES OF DAILY LIVING (ADL)
ADLS_ACUITY_SCORE: 39
ADLS_ACUITY_SCORE: 37
ADLS_ACUITY_SCORE: 39
ADLS_ACUITY_SCORE: 39

## 2024-06-11 ASSESSMENT — COLUMBIA-SUICIDE SEVERITY RATING SCALE - C-SSRS
2. HAVE YOU ACTUALLY HAD ANY THOUGHTS OF KILLING YOURSELF IN THE PAST MONTH?: NO
6. HAVE YOU EVER DONE ANYTHING, STARTED TO DO ANYTHING, OR PREPARED TO DO ANYTHING TO END YOUR LIFE?: YES
1. IN THE PAST MONTH, HAVE YOU WISHED YOU WERE DEAD OR WISHED YOU COULD GO TO SLEEP AND NOT WAKE UP?: NO
1. IN THE PAST MONTH, HAVE YOU WISHED YOU WERE DEAD OR WISHED YOU COULD GO TO SLEEP AND NOT WAKE UP?: NO
2. HAVE YOU ACTUALLY HAD ANY THOUGHTS OF KILLING YOURSELF IN THE PAST MONTH?: NO
6. HAVE YOU EVER DONE ANYTHING, STARTED TO DO ANYTHING, OR PREPARED TO DO ANYTHING TO END YOUR LIFE?: NO

## 2024-06-11 NOTE — ED TRIAGE NOTES
Dysuria that started yesterday.  C/o frequency and states urine is green in nature.  Denies any fevers or back pain.

## 2024-06-11 NOTE — ED PROVIDER NOTES
EMERGENCY DEPARTMENT ENCOUNTER      NAME: Sadaf Ross  AGE: 23 year old female  YOB: 2000  MRN: [id number]  EVALUATION DATE & TIME: 6/11/2024  2:10 PM    PCP: [facility]    ED PROVIDER: Matt Solorzano M.D.    Chief Complaint   Patient presents with    Dysuria       FINAL IMPRESSION:  1. Dysuria        ED COURSE & MEDICAL DECISION MAKING:    Pertinent Labs & Imaging studies independently interpreted by me. (See chart for details)  Reviewed urgent care visit from yesterday when patient reported the same symptoms, has been seen multiple times in the emergency department over the last week for dysuria with normal urinalysis each time as well as negative urine cultures.  Reviewed urine testing from June 5 and tonight, neither of which were consistent with infection although cultures were not done.  Days.  Additionally reviewed prior urinalysis from May 27 which had 11 white cells, 34 red cells, otherwise negative and urine culture was negative as well.  ED Course as of 06/11/24 1508   Tue Jun 11, 2024   1437 Review of chart shows patient has had 42 Emergency Department visits since May 1, often multiple visits in the same day.   1446 Patient seen and examined, presents with dysuria and vaginal irritation for the last couple of days.  Review of chart shows that she has multiple emergency department visits for dysuria in the last couple weeks with negative urinalysis.  No back pain, no abdominal pain.  Denies vaginal discharge or vaginal pain or itching.  On exam, vitally stable, no flank tenderness to percussion and no abdominal tenderness.  Urinalysis obtained although suspect this will be normal.  Patient does note that when she uses the swabs to wipe she has some urethral pain, consider vaginitis and will be started on metronidazole.  Declined pelvic exam today.   1508 Urinalysis is not consistent with infection.  Patient is stable for discharge.         At the conclusion of the encounter I  discussed the results of all of the tests and the disposition. The questions were answered. The patient or family acknowledged understanding and was agreeable with the care plan.     Medical Decision Making  Obtained supplemental history:Supplemental history obtained?: No  Reviewed external records: External records reviewed?: Documented in chart  Care impacted by chronic illness:Mental Health  Care significantly affected by social determinants of health:Access to Affordable Health Care and Medication Noncompliance  Did you consider but not order tests?: Work up considered but not performed and documented in chart, if applicable  Did you interpret images independently?: Independent interpretation of ECG and images noted in documentation, when applicable.  Consultation discussion with other provider:Did you involve another provider (consultant, , pharmacy, etc.)?: No  Discharge. I prescribed additional prescription strength medication(s) as charted. N/A.    MEDICATIONS GIVEN IN THE EMERGENCY:  Medications - No data to display    NEW PRESCRIPTIONS STARTED AT TODAY'S ER VISIT  New Prescriptions    METRONIDAZOLE (FLAGYL) 500 MG TABLET    Take 1 tablet (500 mg) by mouth 2 times daily for 7 days     =================================================================    HPI    Patient information was obtained from: patient      Sadaf Ross is a 23 year old female with a pertinent history of developmental delay, bipolar disorder who presents to this ED for evaluation of dysuria.  Patient says this been going on for couple of days.  Denies blood in the urine.  No abdominal pain, no back pain.  No fevers.  No vaginal bleeding or discharge, no vaginal itching or irritation but does note that she has some pain when using cleansing wipes.    REVIEW OF SYSTEMS   Review of Systems   All other systems reviewed and negative    PAST MEDICAL HISTORY:  Past Medical History:   Diagnosis Date    ADHD (attention deficit  hyperactivity disorder)     Bipolar 1 disorder (H)     Borderline personality disorder (H)     Depressive disorder     Intellectual disability     Obesity     Syncope        PAST SURGICAL HISTORY:  Past Surgical History:   Procedure Laterality Date    APPENDECTOMY         CURRENT MEDICATIONS:    No current facility-administered medications for this encounter.     Current Outpatient Medications   Medication Sig Dispense Refill    metroNIDAZOLE (FLAGYL) 500 MG tablet Take 1 tablet (500 mg) by mouth 2 times daily for 7 days 14 tablet 0    acetaminophen (TYLENOL) 325 MG tablet Take 650 mg by mouth every 6 hours as needed for mild pain      ARIPiprazole lauroxil ER (ARISTADA) 882 MG/3.2ML intra-muscular Inject 882 mg into the muscle every 28 days On the 22nd of each month      bisacodyl (DULCOLAX) 5 MG EC tablet Take 1 tablet (5 mg) by mouth daily as needed for constipation 30 tablet 0    cloNIDine (CATAPRES) 0.1 MG tablet Take 1 tablet by mouth daily      dicyclomine (BENTYL) 20 MG tablet Take 20 mg by mouth 2 times daily as needed (dyspepsia)      divalproex sodium extended-release (DEPAKOTE ER) 250 MG 24 hr tablet Take 500 mg by mouth daily      docusate sodium (COLACE) 50 MG capsule Take 100 mg by mouth 2 times daily      famotidine (PEPCID) 20 MG tablet Take 20 mg by mouth daily      FLUoxetine (PROZAC) 40 MG capsule Take 80 mg by mouth daily      hydrOXYzine (ATARAX) 50 MG tablet Take 50 mg by mouth 2 times daily as needed for anxiety      loperamide (IMODIUM A-D) 2 MG tablet Take 2 tablets (4 mg) in the morning.  Take 1 tablet (2 mg) with every loose stool.  Do not exceed 8 tablets in a day. 30 tablet 0    OLANZapine zydis (ZYPREXA) 5 MG ODT Take 1 tablet (5 mg) by mouth At Bedtime 30 tablet 0    ondansetron (ZOFRAN) 4 MG tablet Take 1 tablet (4 mg) by mouth every 8 hours as needed 15 tablet 0    pantoprazole (PROTONIX) 40 MG EC tablet Take 40 mg by mouth daily      promethazine (PHENERGAN) 12.5 MG tablet Take  12.5 mg by mouth every 4 hours      senna-docusate (SENOKOT-S/PERICOLACE) 8.6-50 MG tablet Take 1 tablet by mouth 2 times daily      traZODone (DESYREL) 50 MG tablet Take 100 mg by mouth At Bedtime      Vitamin D, Cholecalciferol, 25 MCG (1000 UT) TABS Take 1,000 Units by mouth daily          ALLERGIES:  Allergies   Allergen Reactions    Penicillins Rash and Unknown       FAMILY HISTORY:  Family History   Problem Relation Age of Onset    Diabetes Type 1 Father     Cancer Paternal Grandfather        SOCIAL HISTORY:   Social History     Socioeconomic History    Marital status: Single   Tobacco Use    Smoking status: Some Days     Current packs/day: 0.25     Average packs/day: 0.3 packs/day for 5.0 years (1.3 ttl pk-yrs)     Types: Cigarettes, Vaping Device    Smokeless tobacco: Former   Substance and Sexual Activity    Alcohol use: No    Drug use: No    Sexual activity: Not Currently     Partners: Female, Male     Birth control/protection: Injection     Social Determinants of Health     Financial Resource Strain: At Risk (1/20/2021)    Received from [facility]     Financial Resource Strain     Financial Resource Strain: 2   Food Insecurity: At Risk (1/20/2021)    Received from [facility]     Food Insecurity     Food: 2   Transportation Needs: At Risk (1/20/2021)    Received from [facility]     Transportation Needs     Transportation: 2   Physical Activity: Not on File (7/17/2020)    Received from [facility]     Physical Activity     Physical Activity: 0   Stress: Not at Risk (1/20/2021)    Received from [facility]     Stress     Stress: 1   Social Connections: Not at Risk (1/20/2021)    Received from [facility]     Social Connections     Social Connections and Isolation: 1   Interpersonal Safety: Not At Risk (5/31/2024)    Received from [facility]     Humiliation, Afraid, Rape, and Kick questionnaire     Fear of Current or Ex-Partner: No     Emotionally Abused: No     Physically Abused: No      Sexually Abused: No   Housing Stability: Not at Risk (2021)    Received from [facility]     Housing Stability     Housin       VITALS:  /67   Pulse 79   Temp 98.4  F (36.9  C) (Temporal)   Resp 17   Wt 112.9 kg (249 lb)   LMP  (LMP Unknown)   SpO2 100%   BMI 44.11 kg/m      PHYSICAL EXAM:  Physical Exam  Vitals and nursing note reviewed.   Constitutional:       Appearance: Normal appearance.   HENT:      Head: Normocephalic and atraumatic.      Right Ear: External ear normal.      Left Ear: External ear normal.      Nose: Nose normal.      Mouth/Throat:      Mouth: Mucous membranes are moist.   Eyes:      Extraocular Movements: Extraocular movements intact.      Conjunctiva/sclera: Conjunctivae normal.      Pupils: Pupils are equal, round, and reactive to light.   Cardiovascular:      Rate and Rhythm: Normal rate and regular rhythm.   Pulmonary:      Effort: Pulmonary effort is normal.      Breath sounds: Normal breath sounds. No wheezing or rales.   Abdominal:      General: Abdomen is flat. There is no distension.      Palpations: Abdomen is soft.      Tenderness: There is no abdominal tenderness. There is no guarding.   Musculoskeletal:         General: Normal range of motion.      Cervical back: Normal range of motion and neck supple.      Right lower leg: No edema.      Left lower leg: No edema.   Lymphadenopathy:      Cervical: No cervical adenopathy.   Skin:     General: Skin is warm and dry.   Neurological:      General: No focal deficit present.      Mental Status: She is alert and oriented to person, place, and time. Mental status is at baseline.      Comments: No gross focal neurologic deficits   Psychiatric:         Mood and Affect: Mood normal.         Behavior: Behavior normal.         Thought Content: Thought content normal.          LAB:  All pertinent labs reviewed and interpreted.  Results for orders placed or performed during the hospital encounter of 24   UA with  Microscopic reflex to Culture    Specimen: Urine, Clean Catch   Result Value Ref Range    Color Urine Yellow Colorless, Straw, Light Yellow, Yellow    Appearance Urine Clear Clear    Glucose Urine Negative Negative mg/dL    Bilirubin Urine Negative Negative    Ketones Urine 40 (A) Negative mg/dL    Specific Gravity Urine 1.031 (H) 1.001 - 1.030    Blood Urine Negative Negative    pH Urine 5.5 5.0 - 7.0    Protein Albumin Urine Negative Negative mg/dL    Urobilinogen Urine <2.0 <2.0 mg/dL    Nitrite Urine Negative Negative    Leukocyte Esterase Urine Negative Negative    Mucus Urine Present (A) None Seen /LPF    RBC Urine 1 <=2 /HPF    WBC Urine 3 <=5 /HPF    Squamous Epithelials Urine 3 (H) <=1 /HPF   HCG qualitative urine   Result Value Ref Range    hCG Urine Qualitative Negative Negative       RADIOLOGY:  Reviewed all pertinent imaging. Please see official radiology report.  No orders to display     Matt Solorzano M.D.  Emergency Medicine  [facility] EMERGENCY DEPARTMENT  [facility] [facility] [id number] [phone]  Dept: [phone]       Matt Solorzano MD  06/11/24 5433

## 2024-06-12 ENCOUNTER — HOSPITAL ENCOUNTER (EMERGENCY)
Facility: CLINIC | Age: 24
Discharge: HOME | End: 2024-06-12
Attending: FAMILY MEDICINE | Admitting: FAMILY MEDICINE
Payer: MEDICARE

## 2024-06-12 VITALS
SYSTOLIC BLOOD PRESSURE: 134 MMHG | RESPIRATION RATE: 16 BRPM | DIASTOLIC BLOOD PRESSURE: 86 MMHG | BODY MASS INDEX: 44.12 KG/M2 | OXYGEN SATURATION: 100 % | TEMPERATURE: 98.9 F | HEART RATE: 84 BPM | HEIGHT: 63 IN | WEIGHT: 249 LBS

## 2024-06-12 DIAGNOSIS — E86.0 MILD DEHYDRATION: ICD-10-CM

## 2024-06-12 DIAGNOSIS — R30.0 DYSURIA: ICD-10-CM

## 2024-06-12 DIAGNOSIS — R42 LIGHTHEADEDNESS: ICD-10-CM

## 2024-06-12 LAB
ALBUMIN UR-MCNC: NEGATIVE MG/DL
ANION GAP SERPL CALCULATED.3IONS-SCNC: 12 MMOL/L (ref 7–15)
APPEARANCE UR: CLEAR
BASOPHILS # BLD AUTO: 0.1 10E3/UL (ref 0–0.2)
BASOPHILS NFR BLD AUTO: 1 %
BILIRUB UR QL STRIP: NEGATIVE
BUN SERPL-MCNC: 15.6 MG/DL (ref 6–20)
CALCIUM SERPL-MCNC: 9.4 MG/DL (ref 8.6–10)
CHLORIDE SERPL-SCNC: 103 MMOL/L (ref 98–107)
COLOR UR AUTO: YELLOW
CREAT SERPL-MCNC: 0.82 MG/DL (ref 0.51–0.95)
DEPRECATED HCO3 PLAS-SCNC: 22 MMOL/L (ref 22–29)
EGFRCR SERPLBLD CKD-EPI 2021: >90 ML/MIN/1.73M2
EOSINOPHIL # BLD AUTO: 0.3 10E3/UL (ref 0–0.7)
EOSINOPHIL NFR BLD AUTO: 3 %
ERYTHROCYTE [DISTWIDTH] IN BLOOD BY AUTOMATED COUNT: 13.9 % (ref 10–15)
GLUCOSE SERPL-MCNC: 80 MG/DL (ref 70–99)
GLUCOSE UR STRIP-MCNC: NEGATIVE MG/DL
HCT VFR BLD AUTO: 37.7 % (ref 35–47)
HGB BLD-MCNC: 12.3 G/DL (ref 11.7–15.7)
HGB UR QL STRIP: NEGATIVE
IMM GRANULOCYTES # BLD: 0.1 10E3/UL
IMM GRANULOCYTES NFR BLD: 1 %
KETONES UR STRIP-MCNC: 20 MG/DL
LEUKOCYTE ESTERASE UR QL STRIP: ABNORMAL
LYMPHOCYTES # BLD AUTO: 2.5 10E3/UL (ref 0.8–5.3)
LYMPHOCYTES NFR BLD AUTO: 27 %
MCH RBC QN AUTO: 26.7 PG (ref 26.5–33)
MCHC RBC AUTO-ENTMCNC: 32.6 G/DL (ref 31.5–36.5)
MCV RBC AUTO: 82 FL (ref 78–100)
MONOCYTES # BLD AUTO: 0.3 10E3/UL (ref 0–1.3)
MONOCYTES NFR BLD AUTO: 4 %
MUCOUS THREADS #/AREA URNS LPF: PRESENT /LPF
NEUTROPHILS # BLD AUTO: 5.8 10E3/UL (ref 1.6–8.3)
NEUTROPHILS NFR BLD AUTO: 64 %
NITRATE UR QL: NEGATIVE
NRBC # BLD AUTO: 0 10E3/UL
NRBC BLD AUTO-RTO: 0 /100
PH UR STRIP: 5.5 [PH] (ref 5–7)
PLATELET # BLD AUTO: 225 10E3/UL (ref 150–450)
POTASSIUM SERPL-SCNC: 4.9 MMOL/L (ref 3.4–5.3)
RBC # BLD AUTO: 4.6 10E6/UL (ref 3.8–5.2)
RBC URINE: 1 /HPF
SODIUM SERPL-SCNC: 137 MMOL/L (ref 135–145)
SP GR UR STRIP: 1.02 (ref 1–1.03)
SQUAMOUS EPITHELIAL: 2 /HPF
UROBILINOGEN UR STRIP-MCNC: NORMAL MG/DL
WBC # BLD AUTO: 9 10E3/UL (ref 4–11)
WBC URINE: 2 /HPF

## 2024-06-12 PROCEDURE — 81001 URINALYSIS AUTO W/SCOPE: CPT

## 2024-06-12 PROCEDURE — 99284 EMERGENCY DEPT VISIT MOD MDM: CPT | Mod: FS | Performed by: FAMILY MEDICINE

## 2024-06-12 PROCEDURE — 96361 HYDRATE IV INFUSION ADD-ON: CPT | Performed by: FAMILY MEDICINE

## 2024-06-12 PROCEDURE — 250N000011 HC RX IP 250 OP 636: Mod: JZ

## 2024-06-12 PROCEDURE — 258N000003 HC RX IP 258 OP 636: Mod: JZ

## 2024-06-12 PROCEDURE — 250N000013 HC RX MED GY IP 250 OP 250 PS 637

## 2024-06-12 PROCEDURE — 96374 THER/PROPH/DIAG INJ IV PUSH: CPT | Performed by: FAMILY MEDICINE

## 2024-06-12 PROCEDURE — 36415 COLL VENOUS BLD VENIPUNCTURE: CPT

## 2024-06-12 PROCEDURE — 82374 ASSAY BLOOD CARBON DIOXIDE: CPT

## 2024-06-12 PROCEDURE — 85041 AUTOMATED RBC COUNT: CPT

## 2024-06-12 PROCEDURE — 82565 ASSAY OF CREATININE: CPT

## 2024-06-12 PROCEDURE — 99284 EMERGENCY DEPT VISIT MOD MDM: CPT | Mod: 25 | Performed by: FAMILY MEDICINE

## 2024-06-12 RX ORDER — ONDANSETRON 2 MG/ML
4 INJECTION INTRAMUSCULAR; INTRAVENOUS ONCE
Status: COMPLETED | OUTPATIENT
Start: 2024-06-12 | End: 2024-06-12

## 2024-06-12 RX ORDER — PHENAZOPYRIDINE HYDROCHLORIDE 100 MG/1
100 TABLET, FILM COATED ORAL ONCE
Status: COMPLETED | OUTPATIENT
Start: 2024-06-12 | End: 2024-06-12

## 2024-06-12 RX ADMIN — SODIUM CHLORIDE 1000 ML: 9 INJECTION, SOLUTION INTRAVENOUS at 14:21

## 2024-06-12 RX ADMIN — PHENAZOPYRIDINE 100 MG: 100 TABLET ORAL at 14:29

## 2024-06-12 RX ADMIN — ONDANSETRON 4 MG: 2 INJECTION INTRAMUSCULAR; INTRAVENOUS at 14:20

## 2024-06-12 ASSESSMENT — ACTIVITIES OF DAILY LIVING (ADL)
ADLS_ACUITY_SCORE: 37
ADLS_ACUITY_SCORE: 39

## 2024-06-12 NOTE — DISCHARGE INSTRUCTIONS
Continue to do plenty of fluids as to keep yourself hydrated as this may be one of the causes of your lightheadedness and nausea  Continue your antibiotic as prescribed by the other ED  Continue bland diet as tolerated to help with symptoms of nausea  Otherwise follow-up with your PCP office this week for reevaluation and recheck of your symptoms as needed  Return to the ED or nearest emergency department you have concerning or worsening signs or symptoms

## 2024-06-12 NOTE — ED PROVIDER NOTES
"ED Provider Note  [facility]      History     Chief Complaint   Patient presents with    Fatigue     Pt reports feeling fatigued and has been having increased anxiety and insomnia. Pt reports stated head hurts.       HPI  Sadaf Ross is a 23 year old female with bipolar affective disorder, borderline personality disorder, chronic SI, frequent emergency department visits presents emergency department today for fatigue, anxiety.    She states all day she has been very tired, which she does not know why.  She feels nauseous and had one episode of vomiting prior to arrival, nonbilious non bloody.  She feels very anxious as if she is going to \"blow up.\"  She has not had any incidences today.  However she feels that she might blow up today because it is coming up to the third year anniversary of her father's death.  She denies any SI/HI/self injurious behavior.  Denies any alcohol or drug use.    Patient was seen earlier this morning for dysuria and vaginal irritation, which has been persistent.  Multiple UAs without UTI.  5/27 UA had 11 white cells, 34 red cells.  She was prescribed metronidazole for vaginitis.  UA obtained again, negative for infection.  She denies any lower abdominal, back pain, fevers, chills, urinary frequency or urgency.   She says there is no risk for STD, however she would like to be tested.        Past Medical History  Past Medical History:   Diagnosis Date    ADHD (attention deficit hyperactivity disorder)     Bipolar 1 disorder (H)     Borderline personality disorder (H)     Depressive disorder     Intellectual disability     Obesity     Syncope      Past Surgical History:   Procedure Laterality Date    APPENDECTOMY       acetaminophen (TYLENOL) 325 MG tablet  ARIPiprazole lauroxil ER (ARISTADA) 882 MG/3.2ML intra-muscular  bisacodyl (DULCOLAX) 5 MG EC tablet  cloNIDine (CATAPRES) 0.1 MG tablet  dicyclomine (BENTYL) 20 MG tablet  divalproex sodium " extended-release (DEPAKOTE ER) 250 MG 24 hr tablet  docusate sodium (COLACE) 50 MG capsule  famotidine (PEPCID) 20 MG tablet  FLUoxetine (PROZAC) 40 MG capsule  hydrOXYzine (ATARAX) 50 MG tablet  loperamide (IMODIUM A-D) 2 MG tablet  metroNIDAZOLE (FLAGYL) 500 MG tablet  OLANZapine zydis (ZYPREXA) 5 MG ODT  ondansetron (ZOFRAN) 4 MG tablet  pantoprazole (PROTONIX) 40 MG EC tablet  promethazine (PHENERGAN) 12.5 MG tablet  senna-docusate (SENOKOT-S/PERICOLACE) 8.6-50 MG tablet  traZODone (DESYREL) 50 MG tablet  Vitamin D, Cholecalciferol, 25 MCG (1000 UT) TABS      Allergies   Allergen Reactions    Penicillins Rash and Unknown     Family History  Family History   Problem Relation Age of Onset    Diabetes Type 1 Father     Cancer Paternal Grandfather      Social History   Social History     Tobacco Use    Smoking status: Some Days     Current packs/day: 0.25     Average packs/day: 0.3 packs/day for 5.0 years (1.3 ttl pk-yrs)     Types: Cigarettes, Vaping Device    Smokeless tobacco: Former   Substance Use Topics    Alcohol use: No    Drug use: No      A medically appropriate review of systems was performed with pertinent positives and negatives noted in the HPI, and all other systems negative.    Physical Exam   BP: (!) 140/84  Pulse: 101  Temp: 99.5  F (37.5  C)  Resp: 18  SpO2: 98 %  Physical Exam  General: no acute distress.    HENT: Normocephalic and atraumatic. Trachea midline. Normal voice.  Eyes: EOMI, conjunctivae normal.    Cardiovascular:  Normal rate and regular rhythm.   No murmur heard.  Radial pulses 2+ bilaterally.  Pulmonary:  No respiratory distress. Normal breath sounds bilaterally.  Abdominal: no distension.  Abdomen is soft. There is no mass. There is no abdominal tenderness.  No CVA tenderness  Musculoskeletal:    Moving all extremities spontaneously.  Hyperhidrosis of hands, baseline  Skin: Warm, dry, and well perfused. Good turgor.  Neurological:  No focal deficit present.    Psychiatric:   The  patient is awake, alert.  Appropriate mood and affect.  Intellectual disability.    ED Course, Procedures, & Data      Procedures                Results for orders placed or performed during the hospital encounter of 06/11/24   UA with Microscopic reflex to Culture     Status: Abnormal    Specimen: Urine, Clean Catch   Result Value Ref Range    Color Urine Yellow Colorless, Straw, Light Yellow, Yellow    Appearance Urine Clear Clear    Glucose Urine Negative Negative mg/dL    Bilirubin Urine Negative Negative    Ketones Urine 40 (A) Negative mg/dL    Specific Gravity Urine 1.031 (H) 1.001 - 1.030    Blood Urine Negative Negative    pH Urine 5.5 5.0 - 7.0    Protein Albumin Urine Negative Negative mg/dL    Urobilinogen Urine <2.0 <2.0 mg/dL    Nitrite Urine Negative Negative    Leukocyte Esterase Urine Negative Negative    Mucus Urine Present (A) None Seen /LPF    RBC Urine 1 <=2 /HPF    WBC Urine 3 <=5 /HPF    Squamous Epithelials Urine 3 (H) <=1 /HPF    Narrative    Urine Culture not indicated   HCG qualitative urine     Status: Normal   Result Value Ref Range    hCG Urine Qualitative Negative Negative     Medications   ondansetron (ZOFRAN ODT) ODT tab 4 mg (has no administration in time range)     Labs Ordered and Resulted from Time of ED Arrival to Time of ED Departure - No data to display  No orders to display          Critical care was not performed.     Medical Decision Making  The patient's presentation was of high complexity (an acute health issue posing potential threat to life or bodily function).    The patient's evaluation involved:  review of external note(s) from 3+ sources (see separate area of note for details)  review of 3+ test result(s) ordered prior to this encounter (see separate area of note for details)  strong consideration of a test (see separate area of note for details) that was ultimately deferred  ordering and/or review of 3+ test(s) in this encounter (see separate area of note for  details)  independent interpretation of testing performed by another health professional (see separate area of note for details)    The patient's management necessitated high risk (drug therapy requiring intensive monitoring (see separate area of note for details)).    Assessment & Plan    Patient presents emergency department for multiple complaints.  She frequents the emergency department with different complaints, emergency care plan reviewed.    She has acute complaints today including anxiety and fatigue.  She feels highly anxious, however denies any acute SI/HI.  No alcohol/drug use.  No signs of trauma.  Due to frequent emergency department visits and DEC assessments, no need for repeat DEC assessment tonight due to no acute mental health concerns.    She also has concerns for Recurrent dysuria and vaginal irritation.  ED visit from earlier today at [facility] reviewed.  She was provided Flagyl for suspected vaginitis.  UA again negative for urinary tract infection.     Discussed STD testing, which patient was agreeable to.   Pelvic exam performed with Miriam Vásquez RN as chaperone.  There were no areas of abnormal tenderness, trauma or lesions.  No abnormal vaginal discharge.  Wet prep, chlamydia and gonorrhea testing obtained.  Bimanual exam unremarkable.    Wet prep negative for trichomonas, yeast, clue cells.  Patient wanted to wait for her chlamydia and gonorrhea results, on MyChart tomorrow prior to treatment.  Bloodwork obtained due to episode of emesis.  This was negative for leukocytosis or electrolyte derangements.  TSH high, T4 low, concerning for primary hypothyroidism.  I provided the patient with first dose of Synthroid 25 mcg in the emergency department as well as a prescription.  Discussed that she needs to follow-up with her primary care doctor for reevaluation, she understands the instructions.  Patient understands to follow-up with her MyChart results for gonorrhea chlamydia, likely  resulting tomorrow.  She can call her primary care doctor or go to urgent care for treatment if positive.  In addition, she knows she can always return to the emergency department.  Ondina provided back to her group home.      I have reviewed the nursing notes. I have reviewed the findings, diagnosis, plan and need for follow up with the patient.    New Prescriptions    No medications on file       Final diagnoses:   None         [facility] EMERGENCY DEPARTMENT  6/11/2024         Isa Celis MD  06/12/24 0048

## 2024-06-12 NOTE — ED TRIAGE NOTES
Patient reports she is currently getting treated for UTI but continues to have bladder discomfort.      Triage Assessment (Adult)       Row Name 06/12/24 1253          Triage Assessment    Airway WDL WDL        Respiratory WDL    Respiratory WDL WDL        Skin Circulation/Temperature WDL    Skin Circulation/Temperature WDL WDL        Cardiac WDL    Cardiac WDL WDL        Peripheral/Neurovascular WDL    Peripheral Neurovascular WDL WDL        Cognitive/Neuro/Behavioral WDL    Cognitive/Neuro/Behavioral WDL WDL

## 2024-06-12 NOTE — DISCHARGE INSTRUCTIONS
As discussed, you can follow-up with your gonorrhea/chlamydia results tomorrow.  If positive, you need to obtain antibiotics.  You can do this through your primary care clinic, urgent care or if needed return to the emergency department.  Take the Synthroid as prescribed, and follow-up with your primary care doctor regarding reevaluation of your thyroid levels

## 2024-06-12 NOTE — CONSULTS
DEC Consult Order placed. In consultation with attending provider, no DEC consult is needed at this time. Consult acknowledged.    Bonita Sandoval, LICSW

## 2024-06-12 NOTE — ED PROVIDER NOTES
ED Provider Note  [facility]      History     Chief Complaint   Patient presents with    Dizziness     Dizzy since this afternoon, feels like the room is spinning    Abdominal Pain     Seen in the ED yesterday for the same reason, per patient sx has not resolved.      The history is provided by the patient and medical records. No  was used.     Sadaf Ross is a 23 year old female who presents to the ED today with complaint of lightheadedness and pain with urination.  She was most recently seen here at the [place] ED and a subsequent ED yesterday.  This is her seventh ED visit this month.  She states that she developed lightheadedness 1 hour prior to arrival to the ED.  She is that lightheadedness is worse with positional changes and with head movement.  She feels as though she is spinning intermittently with head movements.  She states moving her head to the right into the left feel the same without any worsening symptoms to one side over the other.  She denies headache or vision changes currently.  She states she had blurry vision yesterday but denies any episodes today.  She does not wear contacts or corrective lenses.  She denies any difficulties ambulating or with her balance.  She states that she has had similar to this in the past with her last episode several weeks ago.  She also reports lower abdominal/pelvic pain for the past 2 to 3 days.  She was seen for this yesterday and was started on antibiotics for bladder infection.  She states that she is continuing to have burning with urination.  She denies any hematuria but some mild increased urgency frequency of urination.  She reports chronic low back pain without any new unilateral flank pain.  She denies fever but reports chills 1 hour ago.  She denies any episodes of emesis but has some mild nausea associated with her pain and possible lightheadedness.    According to chart review, her UA yesterday  was notable for 40 ketones with some mucus and increased specific gravity at 1.031.    Past Medical History  Past Medical History:   Diagnosis Date    ADHD (attention deficit hyperactivity disorder)     Bipolar 1 disorder (H)     Borderline personality disorder (H)     Depressive disorder     Intellectual disability     Obesity     Syncope      Past Surgical History:   Procedure Laterality Date    APPENDECTOMY       ARIPiprazole lauroxil ER (ARISTADA) 882 MG/3.2ML intra-muscular  cloNIDine (CATAPRES) 0.1 MG tablet  dicyclomine (BENTYL) 20 MG tablet  divalproex sodium extended-release (DEPAKOTE ER) 250 MG 24 hr tablet  docusate sodium (COLACE) 50 MG capsule  famotidine (PEPCID) 20 MG tablet  FLUoxetine (PROZAC) 40 MG capsule  hydrOXYzine (ATARAX) 50 MG tablet  pantoprazole (PROTONIX) 40 MG EC tablet  promethazine (PHENERGAN) 12.5 MG tablet  senna-docusate (SENOKOT-S/PERICOLACE) 8.6-50 MG tablet  traZODone (DESYREL) 50 MG tablet  Vitamin D, Cholecalciferol, 25 MCG (1000 UT) TABS  acetaminophen (TYLENOL) 325 MG tablet  bisacodyl (DULCOLAX) 5 MG EC tablet  levothyroxine (SYNTHROID/LEVOTHROID) 25 MCG tablet  loperamide (IMODIUM A-D) 2 MG tablet  metroNIDAZOLE (FLAGYL) 500 MG tablet  OLANZapine zydis (ZYPREXA) 5 MG ODT  ondansetron (ZOFRAN) 4 MG tablet      Allergies   Allergen Reactions    Penicillins Rash and Unknown     Family History  Family History   Problem Relation Age of Onset    Diabetes Type 1 Father     Cancer Paternal Grandfather      Social History   Social History     Tobacco Use    Smoking status: Some Days     Current packs/day: 0.25     Average packs/day: 0.3 packs/day for 5.0 years (1.3 ttl pk-yrs)     Types: Cigarettes, Vaping Device    Smokeless tobacco: Former   Substance Use Topics    Alcohol use: No    Drug use: No      Past medical history, past surgical history, medications, allergies, family history, and social history were reviewed with the patient. No additional pertinent items.     A medically  "appropriate review of systems was performed with pertinent positives and negatives noted in the HPI, and all other systems negative.    Physical Exam   BP: 134/86  Pulse: 84  Temp: 98.9  F (37.2  C)  Resp: 16  Height: 160 cm (5' 3\")  Weight: 112.9 kg (249 lb)  SpO2: 100 %  Physical Exam  Constitutional:       Appearance: She is well-developed. She is obese.   HENT:      Head: Normocephalic and atraumatic.   Eyes:      Extraocular Movements:      Right eye: Normal extraocular motion and no nystagmus.      Left eye: Normal extraocular motion and no nystagmus.      Pupils: Pupils are equal, round, and reactive to light. Pupils are equal.   Cardiovascular:      Rate and Rhythm: Normal rate and regular rhythm.      Heart sounds: Normal heart sounds.   Pulmonary:      Effort: Pulmonary effort is normal. No respiratory distress.      Breath sounds: Normal breath sounds.   Abdominal:      General: Bowel sounds are normal. There is no distension.      Palpations: Abdomen is soft.      Tenderness: There is abdominal tenderness in the suprapubic area. There is no guarding or rebound. Negative signs include Santana's sign, Rovsing's sign and McBurney's sign.   Skin:     General: Skin is warm.   Neurological:      General: No focal deficit present.      Mental Status: She is alert. Mental status is at baseline.   Psychiatric:         Mood and Affect: Mood normal.         Behavior: Behavior normal.         ED Course, Procedures, & Data      Procedures                 Results for orders placed or performed during the hospital encounter of 06/12/24   UA with Microscopic reflex to Culture     Status: Abnormal    Specimen: Urine, Midstream   Result Value Ref Range    Color Urine Yellow Colorless, Straw, Light Yellow, Yellow    Appearance Urine Clear Clear    Glucose Urine Negative Negative mg/dL    Bilirubin Urine Negative Negative    Ketones Urine 20 (A) Negative mg/dL    Specific Gravity Urine 1.025 1.003 - 1.035    Blood Urine " Negative Negative    pH Urine 5.5 5.0 - 7.0    Protein Albumin Urine Negative Negative mg/dL    Urobilinogen Urine Normal Normal, 2.0 mg/dL    Nitrite Urine Negative Negative    Leukocyte Esterase Urine Trace (A) Negative    Mucus Urine Present (A) None Seen /LPF    RBC Urine 1 <=2 /HPF    WBC Urine 2 <=5 /HPF    Squamous Epithelials Urine 2 (H) <=1 /HPF    Narrative    Urine Culture not indicated   Basic metabolic panel     Status: Normal   Result Value Ref Range    Sodium 137 135 - 145 mmol/L    Potassium 4.9 3.4 - 5.3 mmol/L    Chloride 103 98 - 107 mmol/L    Carbon Dioxide (CO2) 22 22 - 29 mmol/L    Anion Gap 12 7 - 15 mmol/L    Urea Nitrogen 15.6 6.0 - 20.0 mg/dL    Creatinine 0.82 0.51 - 0.95 mg/dL    GFR Estimate >90 >60 mL/min/1.73m2    Calcium 9.4 8.6 - 10.0 mg/dL    Glucose 80 70 - 99 mg/dL   CBC with platelets and differential     Status: None   Result Value Ref Range    WBC Count 9.0 4.0 - 11.0 10e3/uL    RBC Count 4.60 3.80 - 5.20 10e6/uL    Hemoglobin 12.3 11.7 - 15.7 g/dL    Hematocrit 37.7 35.0 - 47.0 %    MCV 82 78 - 100 fL    MCH 26.7 26.5 - 33.0 pg    MCHC 32.6 31.5 - 36.5 g/dL    RDW 13.9 10.0 - 15.0 %    Platelet Count 225 150 - 450 10e3/uL    % Neutrophils 64 %    % Lymphocytes 27 %    % Monocytes 4 %    % Eosinophils 3 %    % Basophils 1 %    % Immature Granulocytes 1 %    NRBCs per 100 WBC 0 <1 /100    Absolute Neutrophils 5.8 1.6 - 8.3 10e3/uL    Absolute Lymphocytes 2.5 0.8 - 5.3 10e3/uL    Absolute Monocytes 0.3 0.0 - 1.3 10e3/uL    Absolute Eosinophils 0.3 0.0 - 0.7 10e3/uL    Absolute Basophils 0.1 0.0 - 0.2 10e3/uL    Absolute Immature Granulocytes 0.1 <=0.4 10e3/uL    Absolute NRBCs 0.0 10e3/uL   CBC with platelets differential     Status: None    Narrative    The following orders were created for panel order CBC with platelets differential.  Procedure                               Abnormality         Status                     ---------                               -----------          ------                     CBC with platelets and d...[[id number]]                      Final result                 Please view results for these tests on the individual orders.     Medications   sodium chloride 0.9% BOLUS 1,000 mL (0 mLs Intravenous Stopped 6/12/24 1526)   ondansetron (ZOFRAN) injection 4 mg (4 mg Intravenous $Given 6/12/24 1420)   phenazopyridine (PYRIDIUM) tablet 100 mg (100 mg Oral $Given 6/12/24 1429)     Labs Ordered and Resulted from Time of ED Arrival to Time of ED Departure   ROUTINE UA WITH MICROSCOPIC REFLEX TO CULTURE - Abnormal       Result Value    Color Urine Yellow      Appearance Urine Clear      Glucose Urine Negative      Bilirubin Urine Negative      Ketones Urine 20 (*)     Specific Gravity Urine 1.025      Blood Urine Negative      pH Urine 5.5      Protein Albumin Urine Negative      Urobilinogen Urine Normal      Nitrite Urine Negative      Leukocyte Esterase Urine Trace (*)     Mucus Urine Present (*)     RBC Urine 1      WBC Urine 2      Squamous Epithelials Urine 2 (*)    BASIC METABOLIC PANEL - Normal    Sodium 137      Potassium 4.9      Chloride 103      Carbon Dioxide (CO2) 22      Anion Gap 12      Urea Nitrogen 15.6      Creatinine 0.82      GFR Estimate >90      Calcium 9.4      Glucose 80     CBC WITH PLATELETS AND DIFFERENTIAL    WBC Count 9.0      RBC Count 4.60      Hemoglobin 12.3      Hematocrit 37.7      MCV 82      MCH 26.7      MCHC 32.6      RDW 13.9      Platelet Count 225      % Neutrophils 64      % Lymphocytes 27      % Monocytes 4      % Eosinophils 3      % Basophils 1      % Immature Granulocytes 1      NRBCs per 100 WBC 0      Absolute Neutrophils 5.8      Absolute Lymphocytes 2.5      Absolute Monocytes 0.3      Absolute Eosinophils 0.3      Absolute Basophils 0.1      Absolute Immature Granulocytes 0.1      Absolute NRBCs 0.0       No orders to display          Critical care was not performed.     Medical Decision Making  The patient's  presentation was of moderate complexity (an acute illness with systemic symptoms).    The patient's evaluation involved:  review of external note(s) from 1 sources (both ED visits yesterday 6/11/2024)  review of 1 test result(s) ordered prior to this encounter (UA 6/11/2024)  ordering and/or review of 3+ test(s) in this encounter (see separate area of note for details)    The patient's management necessitated moderate risk (prescription drug management including medications given in the ED).    Assessment & Plan    Sadaf is a 23-year-old female that presented to the ED with complaints of continued lower abdominal/pelvic pain as well as lightheadedness.  Acceptable vital signs on presentation without any tachycardia, fever, hypotension, or hypoxia on room air.  Patient in no acute distress, nontoxic-appearing, and hemodynamically stable.  No neurodeficits on exam.  No nystagmus on EOMs.  No acute abdomen signs on palpation including guarding, rebound, or rigidity.  UA notable for 20 ketones improved from 40 ketones yesterday.  Labs unremarkable and within normal limits.  IV NS bolus, IV Zofran, p.o. pyridium in the ED for symptoms.  Suspect component of dehydration due to ketones in the urine with resulting lightheadedness and nausea symptoms.  Patient reported full improvement of her symptoms after IV fluids and medications in the ED and requested to discharge back to her group home.  Discussed reassuring lab and urine results today as well as agreeability to discharge home with close follow-up outpatient with her PCP office.  Strict return precautions given.  Continue plenty of fluids and antibiotic as prescribed yesterday.  Patient was agreeable to the discharge treatment plan, voiced understanding, and all questions were answered prior to discharge.    I have reviewed the nursing notes. I have reviewed the findings, diagnosis, plan and need for follow up with the patient.    New Prescriptions    No medications  on file       Final diagnoses:   Mild dehydration   Lightheadedness   Dysuria       Leo Lowe MD  [facility] EMERGENCY DEPARTMENT  6/12/2024     Kristina Mosqueda PA-C  06/12/24 3450  --    ED Attending Physician Attestation    I Leo Lowe MD, cared for this patient with the Advanced Practice Provider (BETHANY). I personally provided a substantive portion of the care for this patient, including approving the care plan for the number and complexity of problems addressed and taking responsibility related to the risk of complications and/or morbidity or mortality of patient management. Please see the BETHANY's documentation for full details.    Summary of HPI, PE, ED Course   Patient is a 23 year old female evaluated in the emergency department for pelvic pain and lightheadedness in context of recent diagnosis of UTI.  Patient well-known to the ED, frequently presents with somatic complaints, today denies any psychiatric issues. Exam and ED course notable for normal vital signs, benign exam, unremarkable labs, improved with IV fluids.  No red flags for life-threatening etiology of symptoms.. After the completion of care in the emergency department, the patient was discharged.      Leo Lowe MD  Emergency Medicine          Leo Lowe MD  06/12/24 2099

## 2024-06-12 NOTE — ED NOTES
Bed: [facility]-  Expected date:   Expected time:   Means of arrival:   Comments:  Fadi 751 23 F Tired --To Triage

## 2024-06-13 ENCOUNTER — HOSPITAL ENCOUNTER (EMERGENCY)
Facility: HOSPITAL | Age: 24
Discharge: LEFT BEFORE DISCHARGE | End: 2024-06-13
Attending: EMERGENCY MEDICINE | Admitting: EMERGENCY MEDICINE
Payer: MEDICARE

## 2024-06-13 ENCOUNTER — HOSPITAL ENCOUNTER (EMERGENCY)
Facility: CLINIC | Age: 24
Discharge: HOME | End: 2024-06-13
Admitting: PHYSICIAN ASSISTANT
Payer: MEDICARE

## 2024-06-13 VITALS
SYSTOLIC BLOOD PRESSURE: 113 MMHG | BODY MASS INDEX: 44.11 KG/M2 | DIASTOLIC BLOOD PRESSURE: 82 MMHG | TEMPERATURE: 98.5 F | RESPIRATION RATE: 18 BRPM | OXYGEN SATURATION: 98 % | WEIGHT: 249 LBS | HEART RATE: 68 BPM

## 2024-06-13 VITALS
BODY MASS INDEX: 44.12 KG/M2 | SYSTOLIC BLOOD PRESSURE: 129 MMHG | HEART RATE: 75 BPM | TEMPERATURE: 98.6 F | OXYGEN SATURATION: 98 % | DIASTOLIC BLOOD PRESSURE: 73 MMHG | HEIGHT: 63 IN | WEIGHT: 249 LBS | RESPIRATION RATE: 16 BRPM

## 2024-06-13 DIAGNOSIS — N39.0 ACUTE LOWER UTI: ICD-10-CM

## 2024-06-13 DIAGNOSIS — R30.0 DYSURIA: ICD-10-CM

## 2024-06-13 DIAGNOSIS — G43.009 MIGRAINE WITHOUT AURA AND WITHOUT STATUS MIGRAINOSUS, NOT INTRACTABLE: ICD-10-CM

## 2024-06-13 LAB
ALBUMIN UR-MCNC: 10 MG/DL
ALBUMIN UR-MCNC: NEGATIVE MG/DL
APPEARANCE UR: CLEAR
APPEARANCE UR: CLEAR
BACTERIA #/AREA URNS HPF: ABNORMAL /HPF
BILIRUB UR QL STRIP: NEGATIVE
BILIRUB UR QL STRIP: NEGATIVE
COLOR UR AUTO: ABNORMAL
COLOR UR AUTO: YELLOW
GLUCOSE UR STRIP-MCNC: NEGATIVE MG/DL
GLUCOSE UR STRIP-MCNC: NEGATIVE MG/DL
HCG UR QL: NEGATIVE
HGB UR QL STRIP: NEGATIVE
HGB UR QL STRIP: NEGATIVE
KETONES UR STRIP-MCNC: NEGATIVE MG/DL
KETONES UR STRIP-MCNC: NEGATIVE MG/DL
LEUKOCYTE ESTERASE UR QL STRIP: ABNORMAL
LEUKOCYTE ESTERASE UR QL STRIP: ABNORMAL
MUCOUS THREADS #/AREA URNS LPF: PRESENT /LPF
MUCOUS THREADS #/AREA URNS LPF: PRESENT /LPF
NITRATE UR QL: NEGATIVE
NITRATE UR QL: NEGATIVE
PH UR STRIP: 6 [PH] (ref 5–7)
PH UR STRIP: 6.5 [PH] (ref 5–7)
RBC URINE: 3 /HPF
RBC URINE: 3 /HPF
SP GR UR STRIP: 1.03 (ref 1–1.03)
SP GR UR STRIP: 1.03 (ref 1–1.03)
SQUAMOUS EPITHELIAL: 2 /HPF
SQUAMOUS EPITHELIAL: 4 /HPF
UROBILINOGEN UR STRIP-MCNC: <2 MG/DL
UROBILINOGEN UR STRIP-MCNC: NORMAL MG/DL
WBC URINE: 2 /HPF
WBC URINE: 7 /HPF

## 2024-06-13 PROCEDURE — 99281 EMR DPT VST MAYX REQ PHY/QHP: CPT

## 2024-06-13 PROCEDURE — 99284 EMERGENCY DEPT VISIT MOD MDM: CPT | Performed by: PHYSICIAN ASSISTANT

## 2024-06-13 PROCEDURE — 250N000011 HC RX IP 250 OP 636: Performed by: PHYSICIAN ASSISTANT

## 2024-06-13 PROCEDURE — 81001 URINALYSIS AUTO W/SCOPE: CPT | Performed by: EMERGENCY MEDICINE

## 2024-06-13 PROCEDURE — 250N000013 HC RX MED GY IP 250 OP 250 PS 637: Performed by: PHYSICIAN ASSISTANT

## 2024-06-13 PROCEDURE — 81001 URINALYSIS AUTO W/SCOPE: CPT | Performed by: PHYSICIAN ASSISTANT

## 2024-06-13 PROCEDURE — 81025 URINE PREGNANCY TEST: CPT | Performed by: EMERGENCY MEDICINE

## 2024-06-13 PROCEDURE — 99283 EMERGENCY DEPT VISIT LOW MDM: CPT | Performed by: PHYSICIAN ASSISTANT

## 2024-06-13 RX ORDER — ONDANSETRON 4 MG/1
4 TABLET, ORALLY DISINTEGRATING ORAL EVERY 6 HOURS PRN
Status: DISCONTINUED | OUTPATIENT
Start: 2024-06-13 | End: 2024-06-13

## 2024-06-13 RX ORDER — ACETAMINOPHEN 500 MG
1000 TABLET ORAL ONCE
Status: COMPLETED | OUTPATIENT
Start: 2024-06-13 | End: 2024-06-13

## 2024-06-13 RX ORDER — ONDANSETRON 4 MG/1
4 TABLET, ORALLY DISINTEGRATING ORAL ONCE
Status: COMPLETED | OUTPATIENT
Start: 2024-06-13 | End: 2024-06-13

## 2024-06-13 RX ORDER — PHENAZOPYRIDINE HYDROCHLORIDE 100 MG/1
100 TABLET, FILM COATED ORAL 3 TIMES DAILY PRN
Qty: 6 TABLET | Refills: 0 | Status: SHIPPED | OUTPATIENT
Start: 2024-06-13

## 2024-06-13 RX ORDER — ONDANSETRON 2 MG/ML
4 INJECTION INTRAMUSCULAR; INTRAVENOUS ONCE
Status: DISCONTINUED | OUTPATIENT
Start: 2024-06-13 | End: 2024-06-13

## 2024-06-13 RX ADMIN — ONDANSETRON 4 MG: 4 TABLET, ORALLY DISINTEGRATING ORAL at 19:53

## 2024-06-13 RX ADMIN — ACETAMINOPHEN 1000 MG: 500 TABLET ORAL at 19:52

## 2024-06-13 ASSESSMENT — COLUMBIA-SUICIDE SEVERITY RATING SCALE - C-SSRS

## 2024-06-13 ASSESSMENT — ACTIVITIES OF DAILY LIVING (ADL)
ADLS_ACUITY_SCORE: 39
ADLS_ACUITY_SCORE: 39

## 2024-06-13 NOTE — DISCHARGE INSTRUCTIONS
Thank you for choosing [facility] for your care. It was a pleasure taking care of you today in our Emergency Department.       Instructions from your doctor today:  Emergency Department (ED) testing is focused on the potential causes of your symptoms that are the most dangerous possibilities, and cannot cover every possibility. Based on the evaluation, it was deemed sufficiently safe to discharge and continue management through the clinics. Thus, follow-up is very important to assess for improvement/worsening, potential further testing, and potential treatment adjustments. If you were given opioid pain medications or other medications that can make you drowsy while in the ED, you should not drive for at least several hours and not until you feel completely back to normal.     Please make an appointment to follow up with:  - Your Primary Care Provider in 2-4 days  - If you do not have a primary care provider, you can be seen in follow-up and establish care by calling any of the clinics below:     - [facility] (phone: [phone])     - Primary Care / [facility] (phone: [phone])   - Have your clinic provider review the results from today's visit with you again, including any potential follow-up or additional testing that may be needed based on the results. Occasionally, incidental findings are found on later review by radiologists that may need follow-up.       If you have continued worsening symptoms, please return to the emergency department.

## 2024-06-13 NOTE — ED NOTES
Patient not in waiting room, this RN asked registration where patient went. Registration stated that patient walked out 10 minutes ago. Provider updated.

## 2024-06-13 NOTE — ED PROVIDER NOTES
EMERGENCY DEPARTMENT ENCOUNTER      NAME: Sadaf Ross  AGE: 23 year old female  YOB: 2000  MRN: [id number]  EVALUATION DATE & TIME: No admission date for patient encounter.    PCP: [facility]    ED PROVIDER: Chloe Aguilera      Chief Complaint   Patient presents with    Dysuria         FINAL IMPRESSION:  1. Dysuria    2. Acute lower UTI          ED COURSE & MEDICAL DECISION MAKING:    Pertinent Labs & Imaging studies reviewed. (See chart for details)  23 year old female presents to the Emergency Department for evaluation of continued dysuria with urinary tract infection, but she stated she is mostly here because she is concerned about the color of her urine being orange.  I discussed with patient in detail that this can occur with her taking Pyridium.  She showed pictures, and stated she was still concerned.  I reassured patient that that was okay and to continue to take her antibiotics.  However she was going to wait for her lab work prior to discharge.    Consider imaging and further workup but abdominal exam was benign and patient symptoms are still consistent with urinary tract infection.    Urine pregnancy test was negative, urinalysis showed evidence of any UTI.  Patient left without discharge paperwork or discharge, but we did discuss plan for patient to continue with antibiotics and that the slight change in her urine while she is taking Pyridium is not a concern    12:30 PM I met with the patient, obtained history, performed an initial exam, and discussed options and plan for diagnostics and treatment here in the ED.  1:50 PM Patient left without being re updated. She stated she was still taking antibiotics during our initial encounter.       At the conclusion of the encounter I discussed the results of all of the tests and the disposition. The questions were answered. The patient or family acknowledged understanding and was agreeable with the care plan.       Medical Decision  Making  Obtained supplemental history:Supplemental history obtained?: No  Reviewed external records: External records reviewed?: Documented in chart, Outpatient Record: previous several visits, Prior Labs: last two visits workup an dlabs, and Prior Imaging: last abdominal imaging including US and CT  Care impacted by chronic illness:Other: morbid obesity, intellectual disability  Care significantly affected by social determinants of health:Other: intellectual diability  Did you consider but not order tests?: Work up considered but not performed and documented in chart, if applicable    Consultation discussion with other provider:Did you involve another provider (consultant, , pharmacy, etc.)?: No  Patient left prior to discharge. However, plan was to discharge to home        MEDICATIONS GIVEN IN THE EMERGENCY:  Medications - No data to display    NEW PRESCRIPTIONS STARTED AT TODAY'S ER VISIT  [unfilled]       =================================================================    HPI    Patient information was obtained from: Patient    Use of : N/A       Sadaf Ross is a 23 year old female with a pertinent history of Intellectual disability, Borderline personality disorder, bipolar affective disorder, who presents to this ED via private car for evaluation of dysuria and dark urination.     The patient reports dark urination. This is her eighth visit this month. She was seen and treated previously for a bladder infection. Patient reports her bladder hurts, the pain is worse. She notes her urine is a yellow-orange color.     Per chart review, patient was seen at [facility] ED on 06/12/2024 for evaluation of dysuria. Patient was treated and prescribed with Zofran and Pyridium.     Per chart review, patient was seen at [facility] ED for UTI symptoms on 05/27/2024. On her urine culture, mixed hansel was found. She was given Bactrim.     REVIEW OF SYSTEMS   Review of Systems 10 point ROS negative with  exception of those listed in the HPI.      PAST MEDICAL HISTORY:  Past Medical History:   Diagnosis Date    ADHD (attention deficit hyperactivity disorder)     Bipolar 1 disorder (H)     Borderline personality disorder (H)     Depressive disorder     Intellectual disability     Obesity     Syncope        PAST SURGICAL HISTORY:  Past Surgical History:   Procedure Laterality Date    APPENDECTOMY             CURRENT MEDICATIONS:    phenazopyridine (PYRIDIUM) 100 MG tablet  acetaminophen (TYLENOL) 325 MG tablet  ARIPiprazole lauroxil ER (ARISTADA) 882 MG/3.2ML intra-muscular  bisacodyl (DULCOLAX) 5 MG EC tablet  cloNIDine (CATAPRES) 0.1 MG tablet  diclofenac (VOLTAREN) 1 % topical gel  dicyclomine (BENTYL) 20 MG tablet  divalproex sodium extended-release (DEPAKOTE ER) 250 MG 24 hr tablet  docusate sodium (COLACE) 50 MG capsule  famotidine (PEPCID) 20 MG tablet  FLUoxetine (PROZAC) 40 MG capsule  hydrOXYzine (ATARAX) 50 MG tablet  levothyroxine (SYNTHROID/LEVOTHROID) 25 MCG tablet  loperamide (IMODIUM A-D) 2 MG tablet  metroNIDAZOLE (FLAGYL) 500 MG tablet  OLANZapine zydis (ZYPREXA) 5 MG ODT  ondansetron (ZOFRAN) 4 MG tablet  pantoprazole (PROTONIX) 40 MG EC tablet  promethazine (PHENERGAN) 12.5 MG tablet  senna-docusate (SENOKOT-S/PERICOLACE) 8.6-50 MG tablet  traZODone (DESYREL) 50 MG tablet  Vitamin D, Cholecalciferol, 25 MCG (1000 UT) TABS        ALLERGIES:  Allergies   Allergen Reactions    Penicillins Rash and Unknown       FAMILY HISTORY:  Family History   Problem Relation Age of Onset    Diabetes Type 1 Father     Cancer Paternal Grandfather        SOCIAL HISTORY:   Social History     Socioeconomic History    Marital status: Single   Tobacco Use    Smoking status: Some Days     Current packs/day: 0.25     Average packs/day: 0.3 packs/day for 5.0 years (1.3 ttl pk-yrs)     Types: Cigarettes, Vaping Device    Smokeless tobacco: Former   Substance and Sexual Activity    Alcohol use: No    Drug use: No    Sexual  "activity: Not Currently     Partners: Female, Male     Birth control/protection: Injection     Social Determinants of Health     Financial Resource Strain: At Risk (2021)    Received from [facility]     Financial Resource Strain     Financial Resource Strain: 2   Food Insecurity: At Risk (2021)    Received from [facility]     Food Insecurity     Food: 2   Transportation Needs: At Risk (2021)    [facility] from [facility]     Transportation Needs     Transportation: 2   Physical Activity: Not on File (2020)    Received from [facility]     Physical Activity     Physical Activity: 0   Stress: Not at Risk (2021)    Received from [facility]     Stress     Stress: 1   Social Connections: Not at Risk (2021)    Received from [facility]     Social Connections     Social Connections and Isolation: 1   Interpersonal Safety: Not At Risk (2024)    Received from [facility]     Humiliation, Afraid, Rape, and Kick questionnaire     Fear of Current or Ex-Partner: No     Emotionally Abused: No     Physically Abused: No     Sexually Abused: No   Housing Stability: Not at Risk (2021)    Received from [facility]     Housing Stability     Housin       VITALS:  /73   Pulse 75   Temp 98.6  F (37  C) (Oral)   Resp 16   Ht 1.6 m (5' 3\")   Wt 112.9 kg (249 lb)   LMP  (LMP Unknown)   SpO2 98%   BMI 44.11 kg/m      PHYSICAL EXAM    General: Alert, lying on the bed in NAD  Neuro: Awake alert; moving extremities x 4 face symterical  Eyes: sclera nonicteric conjunctiva clear  Mouth: mmm  Heart: RRR  Lungs:  CTA bilaterally  Abdomen:  soft  non tenderness to palpation no rebound; no guarding +BS  Back: no CVA tenderness  Extremities: no pedal edema       LAB:  All pertinent labs reviewed and interpreted.  Results for orders placed or performed during the hospital encounter of 24   UA with Microscopic reflex to Culture    Specimen: Urine, Midstream   Result " Value Ref Range    Color Urine Yellow Colorless, Straw, Light Yellow, Yellow    Appearance Urine Clear Clear    Glucose Urine Negative Negative mg/dL    Bilirubin Urine Negative Negative    Ketones Urine Negative Negative mg/dL    Specific Gravity Urine 1.027 1.001 - 1.030    Blood Urine Negative Negative    pH Urine 6.5 5.0 - 7.0    Protein Albumin Urine Negative Negative mg/dL    Urobilinogen Urine <2.0 <2.0 mg/dL    Nitrite Urine Negative Negative    Leukocyte Esterase Urine 25 Mindy/uL (A) Negative    Bacteria Urine Few (A) None Seen /HPF    Mucus Urine Present (A) None Seen /LPF    RBC Urine 3 (H) <=2 /HPF    WBC Urine 7 (H) <=5 /HPF    Squamous Epithelials Urine 4 (H) <=1 /HPF   HCG qualitative urine   Result Value Ref Range    hCG Urine Qualitative Negative Negative       RADIOLOGY:  Reviewed all pertinent imaging. Please see official radiology report.  No orders to display       EKG:    None.    PROCEDURES:   None.       I, Chloe Richter, am serving as a scribe to document services personally performed by Sofi Michaels MD based on my observation and the provider's statements to me. I, Sofi Michaels MD, attest that Chloe Richter is acting in a scribe capacity, has observed my performance of the services and has documented them in accordance with my direction.    Sofi Michaels MD  Emergency Medicine  [facility] EMERGENCY DEPARTMENT  [address] [place] [id number]  [phone]       Sofi Michaels MD  06/19/24 [id number]

## 2024-06-14 ENCOUNTER — HOSPITAL ENCOUNTER (EMERGENCY)
Facility: CLINIC | Age: 24
Discharge: HOME | End: 2024-06-14
Attending: EMERGENCY MEDICINE | Admitting: EMERGENCY MEDICINE
Payer: MEDICARE

## 2024-06-14 VITALS
TEMPERATURE: 98.6 F | RESPIRATION RATE: 16 BRPM | DIASTOLIC BLOOD PRESSURE: 78 MMHG | HEART RATE: 83 BPM | SYSTOLIC BLOOD PRESSURE: 118 MMHG | OXYGEN SATURATION: 100 %

## 2024-06-14 DIAGNOSIS — R53.83 OTHER FATIGUE: ICD-10-CM

## 2024-06-14 DIAGNOSIS — R51.9 NONINTRACTABLE HEADACHE, UNSPECIFIED CHRONICITY PATTERN, UNSPECIFIED HEADACHE TYPE: ICD-10-CM

## 2024-06-14 PROCEDURE — 99283 EMERGENCY DEPT VISIT LOW MDM: CPT | Performed by: EMERGENCY MEDICINE

## 2024-06-14 NOTE — DISCHARGE INSTRUCTIONS
Here in the emergency room have reassuring evaluation.  Your urine sample does not show any findings for urinary tract infection.  You had improvement of your symptoms with Tylenol and Zofran here.  You are able to eat and drink.  You are safe for discharge home.    If you develop any new or worsening symptoms, is important to return right away to the emergency department for further evaluation and management.

## 2024-06-14 NOTE — ED PROVIDER NOTES
ED Provider Note  [facility]      History     Chief Complaint   Patient presents with    Fatigue    Abdominal Pain     BIBA from , epigastric pain.     HPI  Sadaf Ross is a 23 year old female with complex past medical history including history of intellectual disability, multiple ER visits, morbid obesity, who presents to the emergency department with concerns for fatigue and abdominal pain.    Per EMR patient recently seen in the emergency department on several different occasions.  She was seen on 2 separate visits on 6/11/2024 initially at [facility] where she presented with concerns for dysuria.  She underwent UA and pregnancy both of which were reassuring.  She was started on Flagyl as there was some concern for possible vaginitis, she declined pelvic exam at that time.  She had a negative UA.  She again returned back to the emergency department later that evening with concerns for fatigue.  During that visit she underwent pelvic exam which was reassuring, negative wet prep test negative urine test.  She was found to have elevated TSH with a low free T4 concerning for primary hypothyroidism.  She was started on Synthroid 25 mcg and instructed to follow-up with primary care.  Patient also presented yesterday to the emergency department [facility] with concerns for dizziness and abdominal pain.  During that visit she was found to have a negative UA, reassuring labs, and ketonuria.  She was given fluids and antiemetics, Azo and felt improved.  Patient again presented to the [facility] emergency department earlier this afternoon with concerns for dysuria.  She elected to leave the department before final disposition was made.  She did undergo negative UPT and had a urine as well obtained  from that visit.      Patient presents today with concerns for dizziness, ongoing dysuria and feeling cold.  She states she has felt cold for the last few days  without any measured fevers runny nose cough dyspnea or chest pain.  She states she does have some generalized abdominal pain which is similar to baseline with nausea also similar to baseline, no vomiting no constipation.  She continues to have dysuria without hematuria symptoms.  She has been taking her prescribed Azo and levothyroxine she states.  She notes that she is also having some headache as well in the absence of any head trauma this is associated with some photophobia.  She is not anticoagulated.  She does have a history of migraines and states her headache today feels similar.        Past Medical History  Past Medical History:   Diagnosis Date    ADHD (attention deficit hyperactivity disorder)     Bipolar 1 disorder (H)     Borderline personality disorder (H)     Depressive disorder     Intellectual disability     Obesity     Syncope      Past Surgical History:   Procedure Laterality Date    APPENDECTOMY       ARIPiprazole lauroxil ER (ARISTADA) 882 MG/3.2ML intra-muscular  bisacodyl (DULCOLAX) 5 MG EC tablet  cloNIDine (CATAPRES) 0.1 MG tablet  divalproex sodium extended-release (DEPAKOTE ER) 250 MG 24 hr tablet  famotidine (PEPCID) 20 MG tablet  FLUoxetine (PROZAC) 40 MG capsule  hydrOXYzine (ATARAX) 50 MG tablet  levothyroxine (SYNTHROID/LEVOTHROID) 25 MCG tablet  metroNIDAZOLE (FLAGYL) 500 MG tablet  OLANZapine zydis (ZYPREXA) 5 MG ODT  pantoprazole (PROTONIX) 40 MG EC tablet  phenazopyridine (PYRIDIUM) 100 MG tablet  traZODone (DESYREL) 50 MG tablet  Vitamin D, Cholecalciferol, 25 MCG (1000 UT) TABS  acetaminophen (TYLENOL) 325 MG tablet  dicyclomine (BENTYL) 20 MG tablet  docusate sodium (COLACE) 50 MG capsule  loperamide (IMODIUM A-D) 2 MG tablet  ondansetron (ZOFRAN) 4 MG tablet  promethazine (PHENERGAN) 12.5 MG tablet  senna-docusate (SENOKOT-S/PERICOLACE) 8.6-50 MG tablet      Allergies   Allergen Reactions    Penicillins Rash and Unknown     Family History  Family History   Problem Relation Age  of Onset    Diabetes Type 1 Father     Cancer Paternal Grandfather      Social History   Social History     Tobacco Use    Smoking status: Some Days     Current packs/day: 0.25     Average packs/day: 0.3 packs/day for 5.0 years (1.3 ttl pk-yrs)     Types: Cigarettes, Vaping Device    Smokeless tobacco: Former   Substance Use Topics    Alcohol use: No    Drug use: No      A medically appropriate review of systems was performed with pertinent positives and negatives noted in the HPI, and all other systems negative.    Physical Exam   BP: 121/83  Pulse: 95  Temp: 99.4  F (37.4  C)  Resp: 16  Weight: 112.9 kg (249 lb)  SpO2: 98 %  Physical Exam  GENERAL APPEARANCE: The patient is well developed, well appearing, and in no acute distress.  HEAD:  Normocephalic and atraumatic.   EENT: Voice normal.  NECK: Trachea is midline.No lymphadenopathy or tenderness.  LUNGS: Breath sounds are equal and clear bilaterally. No wheezes, rhonchi, or rales.  HEART: Regular rate and normal rhythm.    ABDOMEN: Soft, flat, and benign. No mass, tenderness, guarding, or rebound.Bowel sounds are present.  : No CVA tenderness bilaterally  EXTREMITIES: No cyanosis, clubbing, or edema.  NEUROLOGIC: No focal sensory or motor deficits are noted.  Cranial nerves II through XII are grossly intact.  Rapid alternating movements and finger-to-nose testing is intact.   strength is 5 out of 5 bilaterally with resisted plantarflexion dorsiflexion 5 out of 5 bilaterally.  PSYCHIATRIC: The patient is awake, alert.  Appropriate mood and affect.  SKIN: Warm, dry, and well perfused. Good turgor.      ED Course, Procedures, & Data           Results for orders placed or performed during the hospital encounter of 06/13/24   UA with Microscopic reflex to Culture     Status: Abnormal    Specimen: Urine, Clean Catch   Result Value Ref Range    Color Urine Orange (A) Colorless, Straw, Light Yellow, Yellow    Appearance Urine Clear Clear    Glucose Urine Negative  Negative mg/dL    Bilirubin Urine Negative Negative    Ketones Urine Negative Negative mg/dL    Specific Gravity Urine 1.033 1.003 - 1.035    Blood Urine Negative Negative    pH Urine 6.0 5.0 - 7.0    Protein Albumin Urine 10 (A) Negative mg/dL    Urobilinogen Urine Normal Normal, 2.0 mg/dL    Nitrite Urine Negative Negative    Leukocyte Esterase Urine Trace (A) Negative    Mucus Urine Present (A) None Seen /LPF    RBC Urine 3 (H) <=2 /HPF    WBC Urine 2 <=5 /HPF    Squamous Epithelials Urine 2 (H) <=1 /HPF    Narrative    Urine Culture not indicated   Results for orders placed or performed during the hospital encounter of 06/13/24   UA with Microscopic reflex to Culture     Status: Abnormal    Specimen: Urine, Midstream   Result Value Ref Range    Color Urine Yellow Colorless, Straw, Light Yellow, Yellow    Appearance Urine Clear Clear    Glucose Urine Negative Negative mg/dL    Bilirubin Urine Negative Negative    Ketones Urine Negative Negative mg/dL    Specific Gravity Urine 1.027 1.001 - 1.030    Blood Urine Negative Negative    pH Urine 6.5 5.0 - 7.0    Protein Albumin Urine Negative Negative mg/dL    Urobilinogen Urine <2.0 <2.0 mg/dL    Nitrite Urine Negative Negative    Leukocyte Esterase Urine 25 Mindy/uL (A) Negative    Bacteria Urine Few (A) None Seen /HPF    Mucus Urine Present (A) None Seen /LPF    RBC Urine 3 (H) <=2 /HPF    WBC Urine 7 (H) <=5 /HPF    Squamous Epithelials Urine 4 (H) <=1 /HPF    Narrative    Urine Culture not indicated   HCG qualitative urine     Status: Normal   Result Value Ref Range    hCG Urine Qualitative Negative Negative     Medications   acetaminophen (TYLENOL) tablet 1,000 mg (1,000 mg Oral $Given 6/13/24 1952)   ondansetron (ZOFRAN ODT) ODT tab 4 mg (4 mg Oral $Given 6/13/24 1953)     Labs Ordered and Resulted from Time of ED Arrival to Time of ED Departure   ROUTINE UA WITH MICROSCOPIC REFLEX TO CULTURE - Abnormal       Result Value    Color Urine Orange (*)     Appearance  Urine Clear      Glucose Urine Negative      Bilirubin Urine Negative      Ketones Urine Negative      Specific Gravity Urine 1.033      Blood Urine Negative      pH Urine 6.0      Protein Albumin Urine 10 (*)     Urobilinogen Urine Normal      Nitrite Urine Negative      Leukocyte Esterase Urine Trace (*)     Mucus Urine Present (*)     RBC Urine 3 (*)     WBC Urine 2      Squamous Epithelials Urine 2 (*)      No orders to display          Critical care was not performed.     Medical Decision Making  The patient's presentation was of high complexity (a chronic illness severe exacerbation, progression, or side effect of treatment).    The patient's evaluation involved:  review of external note(s) from 1 sources (see separate area of note for details)  review of 3+ test result(s) ordered prior to this encounter (see separate area of note for details)  ordering and/or review of 1 test(s) in this encounter (see separate area of note for details)    The patient's management necessitated moderate risk (prescription drug management including medications given in the ED).    Assessment & Plan    This is a 23-year-old female  with chronic dysuria history presenting with concerns for ongoing dysuria along with feeling cold over the last few days along with several days of positional dizziness with headache in the absence of other red flag signs.  On presentation vitals reviewed within normal limits.  On exam patient has no focal neurologic deficits.  She has benign abdominal exam she has no CVA tenderness.  Reviewed urine sample from earlier in the day showing 25 leukocyte Estrace few bacteria 7 WBCs and 4 squamous cells.  Not overly consistent with an infectious etiology.  Patient does not have findings suggestive of acute intra-abdominal process including appendicitis diverticulitis bowel obstruction gallbladder etiology.  She had a negative pregnancy test earlier today.  She recently underwent pelvic exam with reassuring  wet prep and pending Chlamydia gonorrhea testing.  Suspect these are more chronic symptoms for her.  Will repeat urinalysis today and had a lengthy discussion with patient importance of obtaining a clean sample.  We could potentially send this for culture if indeterminant findings.    In regards to patient's positional dizziness and headache low suspicion of acute intracranial process such as head bleed or stroke.  Patient has no neurologic deficits.  She has symptoms similar to prior migraines.  I offered her IM Toradol here and she declines she is agreeable to Tylenol and Zofran.  I am hesitant to initiate IV as patient is tolerating orals and has had multiple sticks in the last few days.  Low suspicion of electrolyte disturbance or other laboratory abnormality after reviewing normal labs from yesterday.    Repeat UA was obtained here does not suggest infection with trace leukocyte esterase 2 WBCs no nitrites.  On reevaluation after Tylenol and Zofran patient feels improved and feels safe going home.  Discussed with her reports of continuing to follow-up with her primary care team as directed continuing her home medications and return precautions.  Patient has no other questions or concerns at this time.  Red flag signs were addressed, and they were in agreement with the patient care plan provided.    I have reviewed the nursing notes. I have reviewed the findings, diagnosis, plan and need for follow up with the patient.    New Prescriptions    No medications on file       Final diagnoses:   Dysuria   Migraine without aura and without status migrainosus, not intractable       LEANNA Carey  [facility] EMERGENCY DEPARTMENT  6/13/2024

## 2024-06-15 ENCOUNTER — HOSPITAL ENCOUNTER (EMERGENCY)
Facility: CLINIC | Age: 24
Discharge: HOME | End: 2024-06-15
Attending: STUDENT IN AN ORGANIZED HEALTH CARE EDUCATION/TRAINING PROGRAM | Admitting: STUDENT IN AN ORGANIZED HEALTH CARE EDUCATION/TRAINING PROGRAM
Payer: MEDICARE

## 2024-06-15 VITALS
SYSTOLIC BLOOD PRESSURE: 116 MMHG | OXYGEN SATURATION: 98 % | TEMPERATURE: 98.7 F | RESPIRATION RATE: 18 BRPM | HEART RATE: 86 BPM | DIASTOLIC BLOOD PRESSURE: 73 MMHG

## 2024-06-15 DIAGNOSIS — M54.50 ACUTE BILATERAL LOW BACK PAIN WITHOUT SCIATICA: ICD-10-CM

## 2024-06-15 PROCEDURE — 99283 EMERGENCY DEPT VISIT LOW MDM: CPT | Performed by: STUDENT IN AN ORGANIZED HEALTH CARE EDUCATION/TRAINING PROGRAM

## 2024-06-15 PROCEDURE — 250N000013 HC RX MED GY IP 250 OP 250 PS 637: Performed by: STUDENT IN AN ORGANIZED HEALTH CARE EDUCATION/TRAINING PROGRAM

## 2024-06-15 PROCEDURE — 99284 EMERGENCY DEPT VISIT MOD MDM: CPT | Performed by: STUDENT IN AN ORGANIZED HEALTH CARE EDUCATION/TRAINING PROGRAM

## 2024-06-15 RX ORDER — LIDOCAINE 4 G/G
1 PATCH TOPICAL
Status: DISCONTINUED | OUTPATIENT
Start: 2024-06-15 | End: 2024-06-15 | Stop reason: HOSPADM

## 2024-06-15 RX ADMIN — LIDOCAINE 1 PATCH: 4 PATCH TOPICAL at 21:27

## 2024-06-15 ASSESSMENT — ACTIVITIES OF DAILY LIVING (ADL): ADLS_ACUITY_SCORE: 37

## 2024-06-15 NOTE — ED PROVIDER NOTES
[facility] [place] ([facility])    6/14/24       ED PROVIDER NOTE    History     Chief Complaint   Patient presents with    Headache     Pt called EMS for headache, VSS, cooperative in route.     HPI  Sadaf Ross is a 23 year old female with a history of bipolar 1 disorder, ADHD, obesity, and syncope who presents to the ED for evaluation of headache. The patient has a headache and feels fatigued.  No fevers.  No nausea or vomiting.  No other symptoms.  Past Medical History  Past Medical History:   Diagnosis Date    ADHD (attention deficit hyperactivity disorder)     Bipolar 1 disorder (H)     Borderline personality disorder (H)     Depressive disorder     Intellectual disability     Obesity     Syncope      Past Surgical History:   Procedure Laterality Date    APPENDECTOMY       acetaminophen (TYLENOL) 325 MG tablet  ARIPiprazole lauroxil ER (ARISTADA) 882 MG/3.2ML intra-muscular  bisacodyl (DULCOLAX) 5 MG EC tablet  cloNIDine (CATAPRES) 0.1 MG tablet  dicyclomine (BENTYL) 20 MG tablet  divalproex sodium extended-release (DEPAKOTE ER) 250 MG 24 hr tablet  docusate sodium (COLACE) 50 MG capsule  famotidine (PEPCID) 20 MG tablet  FLUoxetine (PROZAC) 40 MG capsule  hydrOXYzine (ATARAX) 50 MG tablet  levothyroxine (SYNTHROID/LEVOTHROID) 25 MCG tablet  loperamide (IMODIUM A-D) 2 MG tablet  metroNIDAZOLE (FLAGYL) 500 MG tablet  OLANZapine zydis (ZYPREXA) 5 MG ODT  ondansetron (ZOFRAN) 4 MG tablet  pantoprazole (PROTONIX) 40 MG EC tablet  phenazopyridine (PYRIDIUM) 100 MG tablet  promethazine (PHENERGAN) 12.5 MG tablet  senna-docusate (SENOKOT-S/PERICOLACE) 8.6-50 MG tablet  traZODone (DESYREL) 50 MG tablet  Vitamin D, Cholecalciferol, 25 MCG (1000 UT) TABS      Allergies   Allergen Reactions    Penicillins Rash and Unknown     Family History  Family History   Problem Relation Age of Onset    Diabetes Type 1 Father     Cancer Paternal Grandfather      Social History   Social History     Tobacco  Use    Smoking status: Some Days     Current packs/day: 0.25     Average packs/day: 0.3 packs/day for 5.0 years (1.3 ttl pk-yrs)     Types: Cigarettes, Vaping Device    Smokeless tobacco: Former   Substance Use Topics    Alcohol use: No    Drug use: No      A medically appropriate review of systems was performed with pertinent positives and negatives noted in the HPI, and all other systems negative.    Physical Exam   BP: 118/78  Pulse: 83  Temp: 98.6  F (37  C)  Resp: 16  SpO2: 100 %  Physical Exam  Vitals and nursing note reviewed.   Constitutional:       General: She is not in acute distress.     Appearance: She is well-developed. She is not ill-appearing.   HENT:      Head: Normocephalic and atraumatic.      Right Ear: External ear normal.      Left Ear: External ear normal.      Nose: Nose normal.   Eyes:      Extraocular Movements: Extraocular movements intact.      Conjunctiva/sclera: Conjunctivae normal.   Pulmonary:      Effort: Pulmonary effort is normal. No respiratory distress.   Abdominal:      General: There is no distension.   Musculoskeletal:         General: No swelling or deformity.      Cervical back: Normal range of motion and neck supple.   Skin:     General: Skin is warm and dry.   Neurological:      Mental Status: Mental status is at baseline.      Comments: Alert, oriented   Psychiatric:         Mood and Affect: Mood normal.         Behavior: Behavior normal.           ED Course, Procedures, & Data      Procedures            No results found for any visits on 06/14/24.  Medications - No data to display  Labs Ordered and Resulted from Time of ED Arrival to Time of ED Departure - No data to display  No orders to display   2023 Emergency Medicine Coding Guide from [email or web address]  on 6/14/2024  ** All calculations should be rechecked by clinician prior to use **    RESULT SUMMARY:  3 Estimated Level of Service  Problems: Low (3)    Risk: Minimal (2)    Data: Limited (3)    NARRATIVE MDM:  This  patient's problem complexity is Low as patient: has ?1 acute, uncomplicated illness(es)/injuries.  This patient's risk is Minimal due to: overall presentation requiring evaluation for a potentially Minimal-risk process.  This patient's data complexity is Limited due to:   -external notes reviewed        INPUTS:  Number and Complexity --> 10 = 3: acute uncomplicated illness/injury (j)  Risk level --> 1 = Minimal  Tests ordered --> 0 = 0  Tests results reviewed (excluding labs) --> 0 = 0  Prior external notes reviewed --> 2 = 2  Assessment requiring and independent historian --> 0 = No  Independent interpretation of tests --> 0 = No  Discussed management/test interpretation w/external professional --> 0 = No       Assessment & Plan    23-year-old female presents to us with chief complaint of headache and fatigue.  I did offer her medications for headache which she declined.  Vital signs are unremarkable.  She is declining any further other treatment and so we will discharge her.  I have reviewed the nursing notes. I have reviewed the findings, diagnosis, plan and need for follow up with the patient.    Discharge Medication List as of 6/14/2024 10:26 PM          Final diagnoses:   Nonintractable headache, unspecified chronicity pattern, unspecified headache type   Other fatigue         [facility] EMERGENCY DEPARTMENT  6/14/2024     Richie Sinha, DO  06/14/24 2706

## 2024-06-16 ENCOUNTER — HOSPITAL ENCOUNTER (EMERGENCY)
Facility: CLINIC | Age: 24
Discharge: HOME | End: 2024-06-16
Attending: EMERGENCY MEDICINE | Admitting: EMERGENCY MEDICINE
Payer: MEDICARE

## 2024-06-16 VITALS
OXYGEN SATURATION: 99 % | RESPIRATION RATE: 14 BRPM | DIASTOLIC BLOOD PRESSURE: 72 MMHG | HEART RATE: 80 BPM | TEMPERATURE: 98.1 F | SYSTOLIC BLOOD PRESSURE: 129 MMHG

## 2024-06-16 DIAGNOSIS — R10.2 SUPRAPUBIC PAIN: ICD-10-CM

## 2024-06-16 DIAGNOSIS — R19.7 DIARRHEA, UNSPECIFIED TYPE: ICD-10-CM

## 2024-06-16 DIAGNOSIS — Z76.89 FREQUENT PATIENT IN EMERGENCY DEPARTMENT: ICD-10-CM

## 2024-06-16 LAB
ALBUMIN UR-MCNC: NEGATIVE MG/DL
APPEARANCE UR: CLEAR
BILIRUB UR QL STRIP: NEGATIVE
COLOR UR AUTO: YELLOW
GLUCOSE UR STRIP-MCNC: NEGATIVE MG/DL
HCG UR QL: NEGATIVE
HGB UR QL STRIP: NEGATIVE
KETONES UR STRIP-MCNC: NEGATIVE MG/DL
LEUKOCYTE ESTERASE UR QL STRIP: NEGATIVE
MUCOUS THREADS #/AREA URNS LPF: PRESENT /LPF
NITRATE UR QL: NEGATIVE
PH UR STRIP: 6.5 [PH] (ref 5–7)
RBC URINE: 1 /HPF
SP GR UR STRIP: 1.03 (ref 1–1.03)
SQUAMOUS EPITHELIAL: 1 /HPF
UROBILINOGEN UR STRIP-MCNC: NORMAL MG/DL
WBC URINE: 2 /HPF

## 2024-06-16 PROCEDURE — 99283 EMERGENCY DEPT VISIT LOW MDM: CPT | Performed by: EMERGENCY MEDICINE

## 2024-06-16 PROCEDURE — 81025 URINE PREGNANCY TEST: CPT | Performed by: EMERGENCY MEDICINE

## 2024-06-16 PROCEDURE — 81001 URINALYSIS AUTO W/SCOPE: CPT | Performed by: EMERGENCY MEDICINE

## 2024-06-16 PROCEDURE — 99284 EMERGENCY DEPT VISIT MOD MDM: CPT | Performed by: EMERGENCY MEDICINE

## 2024-06-16 ASSESSMENT — ACTIVITIES OF DAILY LIVING (ADL)
ADLS_ACUITY_SCORE: 39

## 2024-06-16 NOTE — ED NOTES
"Pt declined all assessment, said \"I'm ready to be discharged\". MD aware and pt was discharged.   "

## 2024-06-16 NOTE — ED NOTES
Bed: ED06  Expected date: 6/16/24  Expected time: 5:46 AM  Means of arrival:   Comments:  [id number]  23 F  [place]

## 2024-06-16 NOTE — ED TRIAGE NOTES
"BIBA    Pt complains of diarrhea that has been \"happening for weeks\" from EMS per pt. Pt is also experiencing bladder pain. VSS en route        "

## 2024-06-16 NOTE — ED PROVIDER NOTES
History     Chief Complaint   Patient presents with    Diarrhea     HPI  Sadaf Ross is a 23 year old female with PMH notable for borderline PD, bipolar 1 disorder, intellectual disability, very frequent ED presentations (> 30 in the past month)  who presents to the ED with diarrhea and dysuria.  Patient reports diarrhea started this morning, has had 2 episodes thus far.  Is brown and watery.  Nonbloody, nonblack.  Also 1-2 episodes of vomiting, food like in appearance.  Patient notes fairly chronic now suprapubic pain, no new abdominal pain.  Patient reports that she is taking antibiotics prescribed about a week ago.    Physical Exam   BP: 129/72  Pulse: 80  Temp: 98.1  F (36.7  C)  Resp: 14  SpO2: 99 %    Physical Exam  General: no acute distress. Appears stated age.   HENT: MMM, no oropharyngeal lesions  Eyes: PERRL, normal sclerae   Cardio: Regular rate. Regular rhythm. Extremities well perfused  Resp: Normal work of breathing, Normal respiratory rate.   Abdomen: Mild suprapubic midline only tenderness, non-distended, no rebound, no guarding  Neuro: alert without signs of confusion. CN II-XII grossly intact. Grossly normal strength and sensation in all extremities.   Psych: normal affect, normal behavior      ED Course      Procedures              Labs Ordered and Resulted from Time of ED Arrival to Time of ED Departure - No data to display  No orders to display        Medical Decision Making  The patient's presentation was of low complexity (an acute and uncomplicated illness or injury).    The patient's evaluation involved:  review of external note(s) from 2 sources ([facility] ED 6/11/2024, [facility] urgent care 6/10/2024)  ordering and/or review of 2 test(s) in this encounter (see separate area of note for details)  Review of test results and previous to this encounter (UA on 6/13 and 6/12, CBC and CMP on 6/12.    The patient's management necessitated moderate risk (limitations due to social  determinants of health (intellectual disability living in group home with very frequent ED presentations)).      Assessments & Plan   Patient presenting with few episodes of diarrhea that started this morning, suprapubic pain that has been continuous for weeks. Vitals in the ED unremarkable. Nursing notes reviewed.     Chart review notable for metronidazole that was started on 6/11/2024.  C. difficile unlikely since metronidazole is the treatment for it.  Patient has had multiple urinalysis performed given recent days/weeks generally negative for signs of UTI.    Today, UA pending.  UPT pending.    Patient signed out to oncoming provider with plan to f/u UA/UPT, likely discharge.     Final diagnoses:   Diarrhea, unspecified type   Suprapubic pain   Frequent patient in emergency department     New Prescriptions    No medications on file     --  Kunal Manriquez MD   Emergency Medicine   [facility] EMERGENCY DEPARTMENT  6/16/2024       Kunal Manriquez MD  06/16/24 [id number]

## 2024-06-16 NOTE — ED PROVIDER NOTES
ED Provider Note  [facility]      History     Chief Complaint   Patient presents with    Abdominal Pain     Abdominal pain x3 days. No N/V/D. Chronic back pain. Pt denies any injury, or mental health concerns today.     HPI  Sadaf Ross is a 23 year old female with a history of bipolar 1 disorder, ADHD, obesity, multiple ED visits who presents to the ED for back pain.  She reports that the pain is localized near her waistband.  She states this pain prevents her from sleeping and it hurts to walk.  She denies loss of control of her bowels or bladder.          Past Medical History  Past Medical History:   Diagnosis Date    ADHD (attention deficit hyperactivity disorder)     Bipolar 1 disorder (H)     Borderline personality disorder (H)     Depressive disorder     Intellectual disability     Obesity     Syncope      Past Surgical History:   Procedure Laterality Date    APPENDECTOMY       acetaminophen (TYLENOL) 325 MG tablet  ARIPiprazole lauroxil ER (ARISTADA) 882 MG/3.2ML intra-muscular  bisacodyl (DULCOLAX) 5 MG EC tablet  cloNIDine (CATAPRES) 0.1 MG tablet  dicyclomine (BENTYL) 20 MG tablet  divalproex sodium extended-release (DEPAKOTE ER) 250 MG 24 hr tablet  docusate sodium (COLACE) 50 MG capsule  famotidine (PEPCID) 20 MG tablet  FLUoxetine (PROZAC) 40 MG capsule  hydrOXYzine (ATARAX) 50 MG tablet  levothyroxine (SYNTHROID/LEVOTHROID) 25 MCG tablet  loperamide (IMODIUM A-D) 2 MG tablet  metroNIDAZOLE (FLAGYL) 500 MG tablet  OLANZapine zydis (ZYPREXA) 5 MG ODT  ondansetron (ZOFRAN) 4 MG tablet  pantoprazole (PROTONIX) 40 MG EC tablet  phenazopyridine (PYRIDIUM) 100 MG tablet  promethazine (PHENERGAN) 12.5 MG tablet  senna-docusate (SENOKOT-S/PERICOLACE) 8.6-50 MG tablet  traZODone (DESYREL) 50 MG tablet  Vitamin D, Cholecalciferol, 25 MCG (1000 UT) TABS      Allergies   Allergen Reactions    Penicillins Rash and Unknown     Family History  Family History   Problem Relation Age of  Onset    Diabetes Type 1 Father     Cancer Paternal Grandfather      Social History   Social History     Tobacco Use    Smoking status: Some Days     Current packs/day: 0.25     Average packs/day: 0.3 packs/day for 5.0 years (1.3 ttl pk-yrs)     Types: Cigarettes, Vaping Device    Smokeless tobacco: Former   Substance Use Topics    Alcohol use: No    Drug use: No      A medically appropriate review of systems was performed with pertinent positives and negatives noted in the HPI, and all other systems negative.    Physical Exam   BP: 116/73  Pulse: 86  Temp: 98.7  F (37.1  C)  Resp: 18  SpO2: 98 %  Physical Exam  Constitutional:       General: She is not in acute distress.     Appearance: Normal appearance. She is not diaphoretic.   HENT:      Head: Atraumatic.      Mouth/Throat:      Mouth: Mucous membranes are moist.   Eyes:      General: No scleral icterus.     Conjunctiva/sclera: Conjunctivae normal.   Cardiovascular:      Rate and Rhythm: Normal rate.      Heart sounds: Normal heart sounds.   Pulmonary:      Effort: No respiratory distress.      Breath sounds: Normal breath sounds.   Abdominal:      General: Abdomen is flat.   Musculoskeletal:      Cervical back: Neck supple.      Comments: Bilateral muscular mild tenderness to palpation on lumbar paraspinal muscles, no midline tenderness, normal exam, minimal tenderness diffusely, 5 out of 5 strength and normal sensation all 4 extremities   Skin:     General: Skin is warm.      Findings: No rash.   Neurological:      Mental Status: She is alert.           ED Course, Procedures, & Data      Procedures                No results found for any visits on 06/15/24.  Medications - No data to display  Labs Ordered and Resulted from Time of ED Arrival to Time of ED Departure - No data to display  No orders to display          Critical care was not performed.     Medical Decision Making  The patient's presentation was of moderate complexity (an undiagnosed new problem  with uncertain prognosis).    The patient's evaluation involved:  review of external note(s) from 3+ sources (see separate area of note for details)  review of 3+ test result(s) ordered prior to this encounter (see separate area of note for details)  strong consideration of a test (labs, CT scan, x-ray) that was ultimately deferred    The patient's management necessitated moderate risk (prescription drug management including medications given in the ED).    Assessment & Plan    The emergency room the patient is presenting for back pain  She reports his back pain is not new, but slightly worse than before, that she has had a before, and is not radiating, no high risk features, no loss of bowel or bladder, saddle anesthesia, new numbness or tingling, or midline back tenderness to palpation or fevers, less likely spinal epidural abscess, cauda equina, spinal pathology  Although I considered pyelo-, possible pregnancy, as patient accesses emergency room often, and reports that the symptoms are acute on chronic, and she believes they are muscular, through shared decision making we will defer further workup at this point  Therefore as patient reports that she already has a physical therapy referral for back pain, will not make additional referral but will have her call the number she has, she agrees to this plan  Give her lidocaine patch here in the emergency room and will discharge home with diclofenac gel, strict return precautions, instructed to follow-up with her primary care doctor in the next 1 to 2 days.    I have reviewed the nursing notes. I have reviewed the findings, diagnosis, plan and need for follow up with the patient.    New Prescriptions    No medications on file       Final diagnoses:   None   I, Vanda Rose, am serving as a trained medical scribe to document services personally performed by Milton Roa MD, based on the provider's statements to me.     I, Milton Roa MD, was physically  present and have reviewed and verified the accuracy of this note documented by Vanda Rose.     Milton Roa MD  [facility] EMERGENCY DEPARTMENT  6/15/2024        Milton Roa MD  06/15/24 [id number]

## 2024-06-16 NOTE — ED TRIAGE NOTES
Triage Assessment (Adult)       Row Name 06/15/24 2034          Triage Assessment    Airway WDL WDL        Respiratory WDL    Respiratory WDL WDL        Skin Circulation/Temperature WDL    Skin Circulation/Temperature WDL WDL        Cardiac WDL    Cardiac WDL WDL        Peripheral/Neurovascular WDL    Peripheral Neurovascular WDL WDL        Cognitive/Neuro/Behavioral WDL    Cognitive/Neuro/Behavioral WDL WDL

## 2024-06-16 NOTE — DISCHARGE INSTRUCTIONS
You were found to have back pain here in the emergency room and I encourage you to use Tylenol and ibuprofen as we discussed  I am discharging you home with some patches for pain, but I do encourage you to use your physical therapy appointment as we discussed  If you have new numbness, tingling, loss of bowel or bladder, or any other new or worsening symptoms please return immediately to the emergency room  Otherwise please follow-up with your primary care doctor in the next 1 to 2 days.

## 2024-06-17 ENCOUNTER — HOSPITAL ENCOUNTER (EMERGENCY)
Facility: CLINIC | Age: 24
Discharge: HOME | End: 2024-06-17
Attending: EMERGENCY MEDICINE | Admitting: EMERGENCY MEDICINE
Payer: MEDICARE

## 2024-06-17 VITALS
RESPIRATION RATE: 16 BRPM | TEMPERATURE: 98.6 F | OXYGEN SATURATION: 99 % | WEIGHT: 257.5 LBS | HEART RATE: 90 BPM | SYSTOLIC BLOOD PRESSURE: 129 MMHG | DIASTOLIC BLOOD PRESSURE: 83 MMHG | BODY MASS INDEX: 45.61 KG/M2

## 2024-06-17 DIAGNOSIS — Z76.89 FREQUENT PATIENT IN EMERGENCY DEPARTMENT: ICD-10-CM

## 2024-06-17 DIAGNOSIS — R25.1 SHAKING: ICD-10-CM

## 2024-06-17 PROCEDURE — 99283 EMERGENCY DEPT VISIT LOW MDM: CPT | Performed by: EMERGENCY MEDICINE

## 2024-06-17 PROCEDURE — 99284 EMERGENCY DEPT VISIT MOD MDM: CPT | Mod: FS | Performed by: EMERGENCY MEDICINE

## 2024-06-17 ASSESSMENT — ACTIVITIES OF DAILY LIVING (ADL): ADLS_ACUITY_SCORE: 37

## 2024-06-17 NOTE — ED NOTES
NM # [id number] BIBA pt called stating she woke up shaky, reported that she just quit smoking couple days ago.

## 2024-06-17 NOTE — DISCHARGE INSTRUCTIONS
Here in the emergency room have reassuring evaluation.  Your vitals are reassuring as well as your exam findings.  We discussed that your shaking may be due to anxiety.  You do not have any findings today suggestive of any emergency process.  You are safe to discharge.    If you develop any new or worsening symptoms, is important to return right away to the emergency department for further evaluation and management.

## 2024-06-17 NOTE — ED PROVIDER NOTES
ED Provider Note  [facility]      History     Chief Complaint   Patient presents with    Shaking     Pt stated waking up this morning her right hand shaking, which started couple days a go.      HPI  Sadaf Ross is a 23 year old female who presents emergency department with concerns for shaking.  She states that over the last 22 days she has been experiencing shaking in both of her upper extremities.  She denies any headache, fevers chest pain dyspnea, or any other concerns.  She states she did not drink any caffeine today.  She has 1 dose left of her Flagyl she states.  She is not having any urinary symptoms.  She denies significant new stressors at her group home.    Past Medical History  Past Medical History:   Diagnosis Date    ADHD (attention deficit hyperactivity disorder)     Bipolar 1 disorder (H)     Borderline personality disorder (H)     Depressive disorder     Intellectual disability     Obesity     Syncope      Past Surgical History:   Procedure Laterality Date    APPENDECTOMY       bisacodyl (DULCOLAX) 5 MG EC tablet  diclofenac (VOLTAREN) 1 % topical gel  levothyroxine (SYNTHROID/LEVOTHROID) 25 MCG tablet  loperamide (IMODIUM A-D) 2 MG tablet  metroNIDAZOLE (FLAGYL) 500 MG tablet  OLANZapine zydis (ZYPREXA) 5 MG ODT  ondansetron (ZOFRAN) 4 MG tablet  phenazopyridine (PYRIDIUM) 100 MG tablet  acetaminophen (TYLENOL) 325 MG tablet  ARIPiprazole lauroxil ER (ARISTADA) 882 MG/3.2ML intra-muscular  cloNIDine (CATAPRES) 0.1 MG tablet  dicyclomine (BENTYL) 20 MG tablet  divalproex sodium extended-release (DEPAKOTE ER) 250 MG 24 hr tablet  docusate sodium (COLACE) 50 MG capsule  famotidine (PEPCID) 20 MG tablet  FLUoxetine (PROZAC) 40 MG capsule  hydrOXYzine (ATARAX) 50 MG tablet  pantoprazole (PROTONIX) 40 MG EC tablet  promethazine (PHENERGAN) 12.5 MG tablet  senna-docusate (SENOKOT-S/PERICOLACE) 8.6-50 MG tablet  traZODone (DESYREL) 50 MG tablet  Vitamin D,  Cholecalciferol, 25 MCG (1000 UT) TABS      Allergies   Allergen Reactions    Penicillins Rash and Unknown     Family History  Family History   Problem Relation Age of Onset    Diabetes Type 1 Father     Cancer Paternal Grandfather      Social History   Social History     Tobacco Use    Smoking status: Some Days     Current packs/day: 0.25     Average packs/day: 0.3 packs/day for 5.0 years (1.3 ttl pk-yrs)     Types: Cigarettes, Vaping Device    Smokeless tobacco: Former   Substance Use Topics    Alcohol use: No    Drug use: No      A medically appropriate review of systems was performed with pertinent positives and negatives noted in the HPI, and all other systems negative.    Physical Exam   BP: 129/83  Pulse: 90  Temp: 98.6  F (37  C)  Resp: 16  Weight: 116.8 kg (257 lb 8 oz)  SpO2: 99 %  Physical Exam  GENERAL APPEARANCE: The patient is well developed, well appearing, and in no acute distress.  HEAD:  Normocephalic and atraumatic.   EENT: Voice normal.  NECK: Trachea is midline.  LUNGS: Breath sounds are equal and clear bilaterally. No wheezes, rhonchi, or rales.  HEART: Regular rate and normal rhythm.    ABDOMEN: Soft, flat, and benign.   EXTREMITIES: No cyanosis, clubbing, or edema.  NEUROLOGIC: No focal sensory or motor deficits are noted.  Cranial nerves II through XII are grossly intact.  Rapid altering movements and finger-to-nose testing is intact.   strength is 5 out of 5 bilaterally.  Resisted plantarflexion dorsiflexion 5 out of 5 bilaterally.  In watching patient text on her cell phone she does not have any tremor present.  When she is telling me about her tremor she does voluntarily stick out her hands and there is a bilateral tremor noted.  PSYCHIATRIC: The patient is awake, alert.  Appropriate mood and affect.  SKIN: Warm, dry, and well perfused. Good turgor.      ED Course, Procedures, & Data           No results found for any visits on 06/17/24.  Medications - No data to display  Labs Ordered  and Resulted from Time of ED Arrival to Time of ED Departure - No data to display  No orders to display          Critical care was not performed.     Medical Decision Making  The patient's presentation was of moderate complexity (an undiagnosed new problem with uncertain prognosis).    The patient's evaluation involved:  history and exam without other MDM data elements    The patient's management necessitated moderate risk (limitations due to social determinants of health (educational or literacy difficulty and social isolation)).    Assessment & Plan    This is a 23-year-old female well-known to the emergency department present with concerns for shaking of her hands intermittently over the last 1 to 2 days without any other symptoms.  On presentation she has reassuring vital signs.  She has no focal neurologic deficits.  In watching patient in the hallway she does not appear to have any tremor present.  I suspect there is a psychogenic etiology for this.  No indication at this time for acute intracranial process to require additional workup.  Patient has no other concerns and she does feel comfortable going back to her group home.  She will be discharged back to her facility.  Patient has no other questions or concerns at this time.  Red flag signs were addressed, and they were in agreement with the patient care plan provided.    Patient seen and discussed with attending physician , who agrees with my plan of care.     --    ED Attending Physician Attestation    I CHAIM CHOWDHURY MD, MD, cared for this patient with the Advanced Practice Provider (BETHANY). I personally provided a substantive portion of the care for this patient, including approving the care plan for the number and complexity of problems addressed and taking responsibility related to the risk of complications and/or morbidity or mortality of patient management. Please see the BETHANY's documentation for full details.    Summary of HPI, PE, ED Course    Patient is a 23 year old female evaluated in the emergency department for hand shaking. Exam and ED course notable for normal exam.  No intention tremor. After the completion of care in the emergency department, the patient was discharged.      CHAIM CHOWDHURY MD, MD  Emergency Medicine      I have reviewed the nursing notes. I have reviewed the findings, diagnosis, plan and need for follow up with the patient.    New Prescriptions    No medications on file       Final diagnoses:   Frequent patient in emergency department   Shaking - Voluntary       LEANNA Carey  [facility] EMERGENCY DEPARTMENT  6/17/2024     Chaim Chowdhury MD  06/17/24 1175

## 2024-06-21 ENCOUNTER — HOSPITAL ENCOUNTER (EMERGENCY)
Facility: CLINIC | Age: 24
Discharge: HOME | End: 2024-06-22
Admitting: FAMILY MEDICINE
Payer: MEDICARE

## 2024-06-21 VITALS
RESPIRATION RATE: 16 BRPM | TEMPERATURE: 97.7 F | OXYGEN SATURATION: 98 % | SYSTOLIC BLOOD PRESSURE: 118 MMHG | DIASTOLIC BLOOD PRESSURE: 80 MMHG | HEART RATE: 90 BPM

## 2024-06-21 PROCEDURE — 99281 EMR DPT VST MAYX REQ PHY/QHP: CPT

## 2024-06-25 ENCOUNTER — HOSPITAL ENCOUNTER (EMERGENCY)
Facility: CLINIC | Age: 24
Discharge: HOME | End: 2024-06-25
Attending: FAMILY MEDICINE | Admitting: FAMILY MEDICINE
Payer: MEDICARE

## 2024-06-25 VITALS
WEIGHT: 250 LBS | HEART RATE: 97 BPM | DIASTOLIC BLOOD PRESSURE: 82 MMHG | HEIGHT: 63 IN | BODY MASS INDEX: 44.3 KG/M2 | SYSTOLIC BLOOD PRESSURE: 124 MMHG | OXYGEN SATURATION: 99 % | RESPIRATION RATE: 16 BRPM | TEMPERATURE: 97.2 F

## 2024-06-25 DIAGNOSIS — F43.23 ADJUSTMENT REACTION WITH ANXIETY AND DEPRESSION: ICD-10-CM

## 2024-06-25 LAB
AMPHETAMINES UR QL SCN: NORMAL
BARBITURATES UR QL SCN: NORMAL
BENZODIAZ UR QL SCN: NORMAL
BZE UR QL SCN: NORMAL
CANNABINOIDS UR QL SCN: NORMAL
FENTANYL UR QL: NORMAL
HCG UR QL: NEGATIVE
OPIATES UR QL SCN: NORMAL
PCP QUAL URINE (ROCHE): NORMAL

## 2024-06-25 PROCEDURE — 80307 DRUG TEST PRSMV CHEM ANLYZR: CPT | Performed by: FAMILY MEDICINE

## 2024-06-25 PROCEDURE — 99284 EMERGENCY DEPT VISIT MOD MDM: CPT | Performed by: FAMILY MEDICINE

## 2024-06-25 PROCEDURE — 99283 EMERGENCY DEPT VISIT LOW MDM: CPT | Performed by: FAMILY MEDICINE

## 2024-06-25 PROCEDURE — 250N000013 HC RX MED GY IP 250 OP 250 PS 637: Performed by: FAMILY MEDICINE

## 2024-06-25 PROCEDURE — 81025 URINE PREGNANCY TEST: CPT | Performed by: FAMILY MEDICINE

## 2024-06-25 RX ORDER — OLANZAPINE 10 MG/1
10 TABLET, ORALLY DISINTEGRATING ORAL ONCE
Status: COMPLETED | OUTPATIENT
Start: 2024-06-25 | End: 2024-06-25

## 2024-06-25 RX ADMIN — OLANZAPINE 10 MG: 10 TABLET, ORALLY DISINTEGRATING ORAL at 15:48

## 2024-06-25 ASSESSMENT — ACTIVITIES OF DAILY LIVING (ADL)
ADLS_ACUITY_SCORE: 39
ADLS_ACUITY_SCORE: 37
ADLS_ACUITY_SCORE: 39

## 2024-06-25 NOTE — ED TRIAGE NOTES
Triage Assessment (Adult)       Row Name 06/25/24 1516          Triage Assessment    Airway WDL WDL

## 2024-06-25 NOTE — ED PROVIDER NOTES
"       [facility] [place] [facility] ([facility])    6/25/24      ED PROVIDER NOTE       History     Chief Complaint   Patient presents with    Depression     \"I feel really depressed.\"     HPI  Sadaf Ross is a 23 year old female with a history of MDD, SI with previous attempt, borderline personality disorder, intellectual disability, anxiety, and bipolar affective disorder who presents to the ED for depression.  Patient is well-known to the emergency department, presents frequently for both psychiatric and medical concerns, has an active care plan that suggests she is better served by returning to her group home and there is limited benefit to a inpatient hospitalization.  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  She states she is having extreme sadness, crying, \"felt attached\", and has been unable to sleep or eat for the last 24 hours.  She reports that her as needed hydroxyzine was not helpful today and she did not know what to do so she came to the emergency department.  She states \"I do not know what to do with my life\", admits to chronic passive suicidal thoughts.  No plan or intent for suicide.  No symptoms of psychosis.    Past Medical History  Past Medical History:   Diagnosis Date    ADHD (attention deficit hyperactivity disorder)     Bipolar 1 disorder (H)     Borderline personality disorder (H)     Depressive disorder     Intellectual disability     Obesity     Syncope      Past Surgical History:   Procedure Laterality Date    APPENDECTOMY       levothyroxine (SYNTHROID/LEVOTHROID) 25 MCG tablet  acetaminophen (TYLENOL) 325 MG tablet  ARIPiprazole lauroxil ER (ARISTADA) 882 MG/3.2ML intra-muscular  bisacodyl (DULCOLAX) 5 MG EC tablet  cloNIDine (CATAPRES) 0.1 MG tablet  diclofenac (VOLTAREN) 1 % topical gel  dicyclomine (BENTYL) 20 MG tablet  divalproex sodium extended-release (DEPAKOTE ER) 250 MG 24 hr " "tablet  docusate sodium (COLACE) 50 MG capsule  famotidine (PEPCID) 20 MG tablet  FLUoxetine (PROZAC) 40 MG capsule  hydrOXYzine (ATARAX) 50 MG tablet  loperamide (IMODIUM A-D) 2 MG tablet  OLANZapine zydis (ZYPREXA) 5 MG ODT  ondansetron (ZOFRAN) 4 MG tablet  pantoprazole (PROTONIX) 40 MG EC tablet  phenazopyridine (PYRIDIUM) 100 MG tablet  promethazine (PHENERGAN) 12.5 MG tablet  senna-docusate (SENOKOT-S/PERICOLACE) 8.6-50 MG tablet  traZODone (DESYREL) 50 MG tablet  Vitamin D, Cholecalciferol, 25 MCG (1000 UT) TABS      Allergies   Allergen Reactions    Penicillins Rash and Unknown     Family History  Family History   Problem Relation Age of Onset    Diabetes Type 1 Father     Cancer Paternal Grandfather      Social History   Social History     Tobacco Use    Smoking status: Some Days     Current packs/day: 0.25     Average packs/day: 0.3 packs/day for 5.0 years (1.3 ttl pk-yrs)     Types: Cigarettes, Vaping Device    Smokeless tobacco: Former   Substance Use Topics    Alcohol use: No    Drug use: No      A medically appropriate review of systems was performed with pertinent positives and negatives noted in the HPI, and all other systems negative.    Physical Exam   BP: 124/82  Pulse: 97  Temp: 97.2  F (36.2  C)  Resp: 16  Height: 160 cm (5' 3\")  Weight: 113.4 kg (250 lb)  SpO2: 99 %  Physical Exam  Vitals and nursing note reviewed.   Constitutional:       General: She is not in acute distress.     Appearance: Normal appearance. She is not diaphoretic.   HENT:      Head: Atraumatic.      Mouth/Throat:      Mouth: Mucous membranes are moist.   Eyes:      General: No scleral icterus.     Conjunctiva/sclera: Conjunctivae normal.   Cardiovascular:      Rate and Rhythm: Normal rate.      Heart sounds: Normal heart sounds.   Pulmonary:      Effort: No respiratory distress.      Breath sounds: Normal breath sounds.   Abdominal:      General: Abdomen is flat.   Musculoskeletal:      Cervical back: Neck supple.   Skin:   "   General: Skin is warm.      Findings: No rash.   Neurological:      Mental Status: She is alert.   Psychiatric:         Attention and Perception: Attention normal.         Mood and Affect: Mood is depressed. Affect is tearful.         Speech: Speech normal.         Behavior: Behavior normal.         Thought Content: Thought content is not paranoid or delusional. Thought content includes suicidal ideation. Thought content does not include homicidal ideation. Thought content does not include suicidal plan.           ED Course, Procedures, & Data      Procedures                 Results for orders placed or performed during the hospital encounter of 06/25/24   HCG qualitative urine     Status: Normal   Result Value Ref Range    hCG Urine Qualitative Negative Negative   Urine Drug Screen Panel     Status: Normal   Result Value Ref Range    Amphetamines Urine Screen Negative Screen Negative    Barbituates Urine Screen Negative Screen Negative    Benzodiazepine Urine Screen Negative Screen Negative    Cannabinoids Urine Screen Negative Screen Negative    Cocaine Urine Screen Negative Screen Negative    Fentanyl Qual Urine Screen Negative Screen Negative    Opiates Urine Screen Negative Screen Negative    PCP Urine Screen Negative Screen Negative   Urine Drug Screen     Status: Normal    Narrative    The following orders were created for panel order Urine Drug Screen.  Procedure                               Abnormality         Status                     ---------                               -----------         ------                     Urine Drug Screen Panel[[id number]]      Normal              Final result                 Please view results for these tests on the individual orders.     Medications   OLANZapine zydis (zyPREXA) ODT tab 10 mg (10 mg Oral $Given 6/25/24 6678)     Labs Ordered and Resulted from Time of ED Arrival to Time of ED Departure   HCG QUALITATIVE URINE - Normal       Result Value    hCG Urine  "Qualitative Negative     URINE DRUG SCREEN PANEL - Normal    Amphetamines Urine Screen Negative      Barbituates Urine Screen Negative      Benzodiazepine Urine Screen Negative      Cannabinoids Urine Screen Negative      Cocaine Urine Screen Negative      Fentanyl Qual Urine Screen Negative      Opiates Urine Screen Negative      PCP Urine Screen Negative       No orders to display          Critical care was not performed.     Medical Decision Making  The patient's presentation was of moderate complexity (a chronic illness mild to moderate exacerbation, progression, or side effect of treatment).    The patient's evaluation involved:  review of external note(s) from 3+ sources (reviewed reviewed care plan in epic, also multiple prior ED encounters.  Earlier this month and in the past)  ordering and/or review of 2 test(s) in this encounter (see separate area of note for details)    The patient's management necessitated moderate risk (prescription drug management including medications given in the ED) and high risk (a decision regarding hospitalization).    Assessment & Plan    23-year-old female, well-known to the ED with a history of borderline personality disorder, intellectual disability, bipolar disorder, resident of a group home with active ED care plan.  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  Exam she is cooperative but tearful depressed appearing.  Physical exam otherwise unremarkable.  Reports passive suicidal thoughts which are chronic.  No signs of psychosis.  Was given a as needed Zyprexa for anxiety.  Slept for several hours.  States she is feeling much better and feels \"ready to go home\".  Ate a meal.  Patient who struggles with emotional control, can become easily dysregulated, under acute stress after a transition with " one of her caregivers and presented feeling depressed and tearful and anxious.  Appears to have returned to her baseline, states she feels safe to be discharged.  Will plan to discharge back to the group home.  We discussed the indications for emergency department return and follow-up.  Stable for discharge.      I have reviewed the nursing notes. I have reviewed the findings, diagnosis, plan and need for follow up with the patient.    New Prescriptions    No medications on file       Final diagnoses:   Adjustment reaction with anxiety and depression       Leo Lowe MD  [facility] EMERGENCY DEPARTMENT  6/25/2024     Leo Lowe MD  06/25/24 9241     <-- Click to add NO pertinent Past Medical History

## 2024-06-26 ENCOUNTER — APPOINTMENT (OUTPATIENT)
Dept: GENERAL RADIOLOGY | Facility: CLINIC | Age: 24
End: 2024-06-26
Attending: INTERNAL MEDICINE
Payer: MEDICARE

## 2024-06-26 ENCOUNTER — HOSPITAL ENCOUNTER (EMERGENCY)
Facility: CLINIC | Age: 24
Discharge: HOME | End: 2024-06-26
Attending: INTERNAL MEDICINE | Admitting: INTERNAL MEDICINE
Payer: MEDICARE

## 2024-06-26 ENCOUNTER — HOSPITAL ENCOUNTER (EMERGENCY)
Facility: CLINIC | Age: 24
Discharge: HOME | End: 2024-06-26
Attending: EMERGENCY MEDICINE | Admitting: EMERGENCY MEDICINE
Payer: MEDICARE

## 2024-06-26 VITALS
RESPIRATION RATE: 20 BRPM | TEMPERATURE: 98.3 F | DIASTOLIC BLOOD PRESSURE: 70 MMHG | SYSTOLIC BLOOD PRESSURE: 113 MMHG | OXYGEN SATURATION: 99 % | HEART RATE: 89 BPM

## 2024-06-26 VITALS
HEIGHT: 63 IN | SYSTOLIC BLOOD PRESSURE: 119 MMHG | TEMPERATURE: 97.8 F | BODY MASS INDEX: 44.3 KG/M2 | HEART RATE: 84 BPM | OXYGEN SATURATION: 99 % | WEIGHT: 250 LBS | DIASTOLIC BLOOD PRESSURE: 77 MMHG | RESPIRATION RATE: 16 BRPM

## 2024-06-26 DIAGNOSIS — R10.9 BILATERAL FLANK PAIN: ICD-10-CM

## 2024-06-26 DIAGNOSIS — M25.561 ACUTE PAIN OF BOTH KNEES: ICD-10-CM

## 2024-06-26 DIAGNOSIS — M25.562 ACUTE PAIN OF BOTH KNEES: ICD-10-CM

## 2024-06-26 LAB
ALBUMIN UR-MCNC: 10 MG/DL
APPEARANCE UR: CLEAR
BILIRUB UR QL STRIP: NEGATIVE
COLOR UR AUTO: YELLOW
GLUCOSE UR STRIP-MCNC: NEGATIVE MG/DL
HGB UR QL STRIP: ABNORMAL
KETONES UR STRIP-MCNC: NEGATIVE MG/DL
LEUKOCYTE ESTERASE UR QL STRIP: NEGATIVE
MUCOUS THREADS #/AREA URNS LPF: PRESENT /LPF
NITRATE UR QL: NEGATIVE
PH UR STRIP: 6 [PH] (ref 5–7)
RBC URINE: <1 /HPF
SP GR UR STRIP: 1.03 (ref 1–1.03)
SQUAMOUS EPITHELIAL: 8 /HPF
UROBILINOGEN UR STRIP-MCNC: NORMAL MG/DL
WBC URINE: 3 /HPF

## 2024-06-26 PROCEDURE — 81001 URINALYSIS AUTO W/SCOPE: CPT | Performed by: EMERGENCY MEDICINE

## 2024-06-26 PROCEDURE — 99284 EMERGENCY DEPT VISIT MOD MDM: CPT | Performed by: EMERGENCY MEDICINE

## 2024-06-26 PROCEDURE — 99283 EMERGENCY DEPT VISIT LOW MDM: CPT | Performed by: INTERNAL MEDICINE

## 2024-06-26 PROCEDURE — 73562 X-RAY EXAM OF KNEE 3: CPT | Mod: 50

## 2024-06-26 PROCEDURE — 250N000013 HC RX MED GY IP 250 OP 250 PS 637: Performed by: EMERGENCY MEDICINE

## 2024-06-26 PROCEDURE — 99284 EMERGENCY DEPT VISIT MOD MDM: CPT | Performed by: INTERNAL MEDICINE

## 2024-06-26 PROCEDURE — 99283 EMERGENCY DEPT VISIT LOW MDM: CPT | Mod: 27 | Performed by: EMERGENCY MEDICINE

## 2024-06-26 RX ORDER — IBUPROFEN 600 MG/1
600 TABLET, FILM COATED ORAL ONCE
Status: COMPLETED | OUTPATIENT
Start: 2024-06-26 | End: 2024-06-26

## 2024-06-26 RX ADMIN — IBUPROFEN 600 MG: 600 TABLET, FILM COATED ORAL at 04:27

## 2024-06-26 ASSESSMENT — ACTIVITIES OF DAILY LIVING (ADL)
ADLS_ACUITY_SCORE: 37
ADLS_ACUITY_SCORE: 39
ADLS_ACUITY_SCORE: 37
ADLS_ACUITY_SCORE: 39

## 2024-06-26 ASSESSMENT — COLUMBIA-SUICIDE SEVERITY RATING SCALE - C-SSRS
6. HAVE YOU EVER DONE ANYTHING, STARTED TO DO ANYTHING, OR PREPARED TO DO ANYTHING TO END YOUR LIFE?: NO
6. HAVE YOU EVER DONE ANYTHING, STARTED TO DO ANYTHING, OR PREPARED TO DO ANYTHING TO END YOUR LIFE?: NO
2. HAVE YOU ACTUALLY HAD ANY THOUGHTS OF KILLING YOURSELF IN THE PAST MONTH?: NO
1. IN THE PAST MONTH, HAVE YOU WISHED YOU WERE DEAD OR WISHED YOU COULD GO TO SLEEP AND NOT WAKE UP?: NO
1. IN THE PAST MONTH, HAVE YOU WISHED YOU WERE DEAD OR WISHED YOU COULD GO TO SLEEP AND NOT WAKE UP?: NO
2. HAVE YOU ACTUALLY HAD ANY THOUGHTS OF KILLING YOURSELF IN THE PAST MONTH?: NO

## 2024-06-26 NOTE — ED TRIAGE NOTES
Patient  reports bilateral knee hurts when she puts weight on it.      Triage Assessment (Adult)       Row Name 06/26/24 1968          Triage Assessment    Airway WDL WDL        Respiratory WDL    Respiratory WDL WDL        Skin Circulation/Temperature WDL    Skin Circulation/Temperature WDL WDL        Cardiac WDL    Cardiac WDL WDL        Peripheral/Neurovascular WDL    Peripheral Neurovascular WDL WDL        Cognitive/Neuro/Behavioral WDL    Cognitive/Neuro/Behavioral WDL WDL

## 2024-06-26 NOTE — ED PROVIDER NOTES
ED Provider Note  [facility]      History     Chief Complaint   Patient presents with    Knee Pain     Bilateral knee pain , patient reports woke this morning and it started hurting       Knee Pain    Sadaf Ross is a 23 year old female who presents with bilateral knee pain R>L with pain most prominent in the proximal lateral knee when she stands or bears weight. The pain has been ongoing for the last 24 hours. She has had no injuries. She has no fever, chills, hip, calf or leg pain.    Past Medical History  Past Medical History:   Diagnosis Date    ADHD (attention deficit hyperactivity disorder)     Bipolar 1 disorder (H)     Borderline personality disorder (H)     Depressive disorder     Intellectual disability     Obesity     Syncope      Past Surgical History:   Procedure Laterality Date    APPENDECTOMY       ARIPiprazole lauroxil ER (ARISTADA) 882 MG/3.2ML intra-muscular  cloNIDine (CATAPRES) 0.1 MG tablet  divalproex sodium extended-release (DEPAKOTE ER) 250 MG 24 hr tablet  docusate sodium (COLACE) 50 MG capsule  famotidine (PEPCID) 20 MG tablet  FLUoxetine (PROZAC) 40 MG capsule  hydrOXYzine (ATARAX) 50 MG tablet  pantoprazole (PROTONIX) 40 MG EC tablet  senna-docusate (SENOKOT-S/PERICOLACE) 8.6-50 MG tablet  traZODone (DESYREL) 50 MG tablet  Vitamin D, Cholecalciferol, 25 MCG (1000 UT) TABS  acetaminophen (TYLENOL) 325 MG tablet  bisacodyl (DULCOLAX) 5 MG EC tablet  diclofenac (VOLTAREN) 1 % topical gel  dicyclomine (BENTYL) 20 MG tablet  levothyroxine (SYNTHROID/LEVOTHROID) 25 MCG tablet  loperamide (IMODIUM A-D) 2 MG tablet  OLANZapine zydis (ZYPREXA) 5 MG ODT  ondansetron (ZOFRAN) 4 MG tablet  phenazopyridine (PYRIDIUM) 100 MG tablet  promethazine (PHENERGAN) 12.5 MG tablet      Allergies   Allergen Reactions    Penicillins Rash and Unknown     Family History  Family History   Problem Relation Age of Onset    Diabetes Type 1 Father     Cancer Paternal Grandfather   "    Social History   Social History     Tobacco Use    Smoking status: Some Days     Current packs/day: 0.25     Average packs/day: 0.3 packs/day for 5.0 years (1.3 ttl pk-yrs)     Types: Cigarettes, Vaping Device    Smokeless tobacco: Former   Substance Use Topics    Alcohol use: No    Drug use: No      A medically appropriate review of systems was performed with pertinent positives and negatives noted in the HPI, and all other systems negative.    Physical Exam   BP: 119/77  Pulse: 84  Temp: 97.8  F (36.6  C)  Resp: 16  Height: 160 cm (5' 3\")  Weight: 113.4 kg (250 lb)  SpO2: 99 %  Physical Exam  Vitals and nursing note reviewed.   Constitutional:       General: She is not in acute distress.     Appearance: Normal appearance.   HENT:      Head: Normocephalic and atraumatic.      Right Ear: External ear normal.      Left Ear: External ear normal.      Nose: Nose normal.   Cardiovascular:      Rate and Rhythm: Normal rate and regular rhythm.   Pulmonary:      Effort: Pulmonary effort is normal.   Musculoskeletal:         General: No swelling or deformity.      Right knee: No swelling, deformity or effusion. Normal range of motion. Tenderness (mild lateral) present. No LCL laxity, MCL laxity or ACL laxity.      Instability Tests: Anterior drawer test negative.      Left knee: No swelling, deformity or effusion. Normal range of motion. Tenderness present. No LCL laxity, MCL laxity or ACL laxity.     Instability Tests: Anterior drawer test negative.   Skin:     General: Skin is warm and dry.   Neurological:      General: No focal deficit present.      Mental Status: She is alert.           ED Course, Procedures, & Data      Procedures         Labs/Imaging    Results for orders placed or performed during the hospital encounter of 06/26/24 (from the past 24 hour(s))   XR Knee Left 3 Views    Narrative    EXAM: XR KNEE LEFT 3 VIEWS  LOCATION: [facility]  DATE: " 6/26/2024    INDICATION: Knee pain  COMPARISON: None.      Impression    IMPRESSION: Normal joint spaces and alignment. No fracture or joint effusion.   XR Knee Right 3 Views    Narrative    EXAM: XR KNEE RIGHT 3 VIEWS  LOCATION: [facility]  DATE: 6/26/2024    INDICATION: Knee pain  COMPARISON: None.      Impression    IMPRESSION: Normal joint spaces and alignment. No fracture or joint effusion.          Results for orders placed or performed during the hospital encounter of 06/26/24   XR Knee Left 3 Views     Status: None    Narrative    EXAM: XR KNEE LEFT 3 VIEWS  LOCATION: [facility]  DATE: 6/26/2024    INDICATION: Knee pain  COMPARISON: None.      Impression    IMPRESSION: Normal joint spaces and alignment. No fracture or joint effusion.   XR Knee Right 3 Views     Status: None    Narrative    EXAM: XR KNEE RIGHT 3 VIEWS  LOCATION: [facility]  DATE: 6/26/2024    INDICATION: Knee pain  COMPARISON: None.      Impression    IMPRESSION: Normal joint spaces and alignment. No fracture or joint effusion.   Results for orders placed or performed during the hospital encounter of 06/26/24   UA with Microscopic     Status: Abnormal   Result Value Ref Range    Color Urine Yellow Colorless, Straw, Light Yellow, Yellow    Appearance Urine Clear Clear    Glucose Urine Negative Negative mg/dL    Bilirubin Urine Negative Negative    Ketones Urine Negative Negative mg/dL    Specific Gravity Urine 1.030 1.003 - 1.035    Blood Urine Trace (A) Negative    pH Urine 6.0 5.0 - 7.0    Protein Albumin Urine 10 (A) Negative mg/dL    Urobilinogen Urine Normal Normal, 2.0 mg/dL    Nitrite Urine Negative Negative    Leukocyte Esterase Urine Negative Negative    Mucus Urine Present (A) None Seen /LPF    RBC Urine <1 <=2 /HPF    WBC Urine 3 <=5 /HPF    Squamous Epithelials Urine 8 (H) <=1 /HPF      Medications - No data to display  Labs Ordered and Resulted from Time of ED Arrival to Time of ED Departure - No data to display  XR Knee Right 3 Views   Final Result   IMPRESSION: Normal joint spaces and alignment. No fracture or joint effusion.      XR Knee Left 3 Views   Final Result   IMPRESSION: Normal joint spaces and alignment. No fracture or joint effusion.             Critical care was not performed.     Medical Decision Making  The patient's presentation was of moderate complexity (a chronic illness mild to moderate exacerbation, progression, or side effect of treatment).    The patient's evaluation involved:  review of external note(s) from 3+ sources (see separate area of note for details)  ordering and/or review of 2 test(s) in this encounter (see separate area of note for details)    The patient's management necessitated only low risk treatment.    Assessment & Plan    Impression:  Young woman with history of obesity, bipolar disorder intellectual disability presents with worsening anterior knee pain R>L with exacerbation int he past 24 hours. She has no knee effusion on exam. Bilateral knee XR is unremarkable. She was seen by ortho a couple of weeks ago and is felt to have patellofemoral knee syndrome, with which I concur. She has been prescribed voltaren gel and had some physical therapy in the past. She is on Depakote which makes systemic NSAIDs less ideal.    I have reviewed the nursing notes. I have reviewed the findings, diagnosis, plan and need for follow up with the patient.    New Prescriptions    No medications on file       Final diagnoses:   Acute pain of both knees       Dilshad Bates  formerly [facility] EMERGENCY DEPARTMENT  6/26/2024     Dilshad Bates MD  06/26/24 4543

## 2024-06-26 NOTE — ED NOTES
Bed: URE-K  Expected date:   Expected time:   Means of arrival:   Comments:  HOLD [facility] 24 Y/O RIGHT SIDED ABD. PAIN

## 2024-06-26 NOTE — DISCHARGE INSTRUCTIONS
You can use tylenol or ibuprofen as needed for pain. Follow up with your clinic doctor within a week. Return with any worsening or concerns.

## 2024-06-26 NOTE — ED NOTES
Patient provided discharge paperwork and instructions. Educated on pain management and follow up care. Agrees to learning. Calling own cab home. Ambulates to exit with even and steady gait.

## 2024-06-26 NOTE — ED PROVIDER NOTES
ED Provider Note  [facility]      History     Chief Complaint   Patient presents with    Flank Pain     HPI  Sadaf Ross is a 23 year old female with a history of MDD, SI with previous attempt, borderline personality disorder, intellectual disability, anxiety, and bipolar affective disorder frequent ED visits who presents to the ED with a complaint of bilateral flank pain that started within the last couple of hours.  She states she took Tylenol but did not really help.  No nausea, vomiting, dysuria, fever, cough.  She states that it started while she was resting.  No trauma.  No other complaints.    Past Medical History  Past Medical History:   Diagnosis Date    ADHD (attention deficit hyperactivity disorder)     Bipolar 1 disorder (H)     Borderline personality disorder (H)     Depressive disorder     Intellectual disability     Obesity     Syncope      Past Surgical History:   Procedure Laterality Date    APPENDECTOMY       acetaminophen (TYLENOL) 325 MG tablet  ARIPiprazole lauroxil ER (ARISTADA) 882 MG/3.2ML intra-muscular  bisacodyl (DULCOLAX) 5 MG EC tablet  cloNIDine (CATAPRES) 0.1 MG tablet  diclofenac (VOLTAREN) 1 % topical gel  dicyclomine (BENTYL) 20 MG tablet  divalproex sodium extended-release (DEPAKOTE ER) 250 MG 24 hr tablet  docusate sodium (COLACE) 50 MG capsule  famotidine (PEPCID) 20 MG tablet  FLUoxetine (PROZAC) 40 MG capsule  hydrOXYzine (ATARAX) 50 MG tablet  levothyroxine (SYNTHROID/LEVOTHROID) 25 MCG tablet  loperamide (IMODIUM A-D) 2 MG tablet  OLANZapine zydis (ZYPREXA) 5 MG ODT  ondansetron (ZOFRAN) 4 MG tablet  pantoprazole (PROTONIX) 40 MG EC tablet  phenazopyridine (PYRIDIUM) 100 MG tablet  promethazine (PHENERGAN) 12.5 MG tablet  senna-docusate (SENOKOT-S/PERICOLACE) 8.6-50 MG tablet  traZODone (DESYREL) 50 MG tablet  Vitamin D, Cholecalciferol, 25 MCG (1000 UT) TABS      Allergies   Allergen Reactions    Penicillins Rash and Unknown     Family  History  Family History   Problem Relation Age of Onset    Diabetes Type 1 Father     Cancer Paternal Grandfather      Social History   Social History     Tobacco Use    Smoking status: Some Days     Current packs/day: 0.25     Average packs/day: 0.3 packs/day for 5.0 years (1.3 ttl pk-yrs)     Types: Cigarettes, Vaping Device    Smokeless tobacco: Former   Substance Use Topics    Alcohol use: No    Drug use: No      A medically appropriate review of systems was performed with pertinent positives and negatives noted in the HPI, and all other systems negative.    Physical Exam   BP: 113/70  Pulse: 89  Temp: 98.3  F (36.8  C)  Resp: 20  SpO2: 99 %  Physical Exam  Constitutional:       General: She is not in acute distress.     Appearance: Normal appearance. She is not toxic-appearing.   HENT:      Head: Atraumatic.      Mouth/Throat:      Mouth: Mucous membranes are moist.   Eyes:      General: No scleral icterus.     Conjunctiva/sclera: Conjunctivae normal.   Cardiovascular:      Rate and Rhythm: Normal rate.      Heart sounds: Normal heart sounds.   Pulmonary:      Effort: No respiratory distress.      Breath sounds: Normal breath sounds.   Abdominal:      Palpations: Abdomen is soft.      Tenderness: There is no abdominal tenderness.   Musculoskeletal:         General: Tenderness present. No deformity.        Arms:       Cervical back: Neck supple.   Skin:     General: Skin is warm.      Findings: No rash.   Neurological:      Mental Status: She is alert.           ED Course, Procedures, & Data      Procedures            Results for orders placed or performed during the hospital encounter of 06/26/24   UA with Microscopic     Status: Abnormal   Result Value Ref Range    Color Urine Yellow Colorless, Straw, Light Yellow, Yellow    Appearance Urine Clear Clear    Glucose Urine Negative Negative mg/dL    Bilirubin Urine Negative Negative    Ketones Urine Negative Negative mg/dL    Specific Gravity Urine 1.030 1.003 -  1.035    Blood Urine Trace (A) Negative    pH Urine 6.0 5.0 - 7.0    Protein Albumin Urine 10 (A) Negative mg/dL    Urobilinogen Urine Normal Normal, 2.0 mg/dL    Nitrite Urine Negative Negative    Leukocyte Esterase Urine Negative Negative    Mucus Urine Present (A) None Seen /LPF    RBC Urine <1 <=2 /HPF    WBC Urine 3 <=5 /HPF    Squamous Epithelials Urine 8 (H) <=1 /HPF     Medications   ibuprofen (ADVIL/MOTRIN) tablet 600 mg (600 mg Oral $Given 6/26/24 2162)     Labs Ordered and Resulted from Time of ED Arrival to Time of ED Departure   ROUTINE UA WITH MICROSCOPIC - Abnormal       Result Value    Color Urine Yellow      Appearance Urine Clear      Glucose Urine Negative      Bilirubin Urine Negative      Ketones Urine Negative      Specific Gravity Urine 1.030      Blood Urine Trace (*)     pH Urine 6.0      Protein Albumin Urine 10 (*)     Urobilinogen Urine Normal      Nitrite Urine Negative      Leukocyte Esterase Urine Negative      Mucus Urine Present (*)     RBC Urine <1      WBC Urine 3      Squamous Epithelials Urine 8 (*)      No orders to display          Critical care was not performed.     Medical Decision Making  The patient's presentation was of moderate complexity (acute pain).    The patient's evaluation involved:  review of external note(s) from 2 sources (previous notes)  review of 3+ test result(s) ordered prior to this encounter (previous results)  ordering and/or review of 1 test(s) in this encounter (see separate area of note for details)    The patient's management necessitated moderate risk (prescription drug management including medications given in the ED).    Assessment & Plan    Her history and exam is reassuring, given that she has bilateral pain which started around the same time.  Exam is pretty benign.  No notable abdominal discomfort or tenderness.  No vomiting, fever, dysuria.  Urinalysis was checked and was negative for evidence to suggest UTI.  Low suspicion for kidney stone  given bilateral nature.  She has a nonsurgical abdominal exam.  No infectious signs or symptoms.  She was given ibuprofen with significant improvement of her symptoms.  Suspect benign etiology such as muscular pain.  She will be discharged at this time, encouraged to follow-up with primary care, return with worsening or concerns.  She verbalizes understanding and is agreeable to the plan.    Dictation Disclaimer: Some of this Note has been completed with voice-recognition dictation software. Although errors are generally corrected real-time, there is the potential for a rare error to be present in the completed chart.      I have reviewed the nursing notes. I have reviewed the findings, diagnosis, plan and need for follow up with the patient.    New Prescriptions    No medications on file       Final diagnoses:   Bilateral flank pain       Lynne Nieves  [facility] EMERGENCY DEPARTMENT  6/26/2024     Lynne Nieves MD  06/26/24 0513

## 2024-06-26 NOTE — DISCHARGE INSTRUCTIONS
Your Xrays were fine today.  Use the voltaren gel 4 times/ day. (You already have).   Tylenol 650 mg every 6 hours as needed for pain.  Knee immobilizer right knee when up on feet.  Follow up with [facility] sports medicine clinic for physical therapy.  Follow up with [facility] clinic.

## 2024-06-27 ENCOUNTER — HOSPITAL ENCOUNTER (EMERGENCY)
Facility: CLINIC | Age: 24
Discharge: HOME | End: 2024-06-27
Attending: EMERGENCY MEDICINE | Admitting: EMERGENCY MEDICINE
Payer: MEDICARE

## 2024-06-27 VITALS
HEART RATE: 83 BPM | RESPIRATION RATE: 18 BRPM | OXYGEN SATURATION: 100 % | TEMPERATURE: 98 F | SYSTOLIC BLOOD PRESSURE: 139 MMHG | DIASTOLIC BLOOD PRESSURE: 83 MMHG

## 2024-06-27 DIAGNOSIS — M54.50 ACUTE LOW BACK PAIN WITHOUT SCIATICA, UNSPECIFIED BACK PAIN LATERALITY: ICD-10-CM

## 2024-06-27 PROCEDURE — 99283 EMERGENCY DEPT VISIT LOW MDM: CPT | Performed by: EMERGENCY MEDICINE

## 2024-06-27 ASSESSMENT — ACTIVITIES OF DAILY LIVING (ADL): ADLS_ACUITY_SCORE: 37

## 2024-06-27 ASSESSMENT — COLUMBIA-SUICIDE SEVERITY RATING SCALE - C-SSRS
6. HAVE YOU EVER DONE ANYTHING, STARTED TO DO ANYTHING, OR PREPARED TO DO ANYTHING TO END YOUR LIFE?: YES
2. HAVE YOU ACTUALLY HAD ANY THOUGHTS OF KILLING YOURSELF IN THE PAST MONTH?: NO
1. IN THE PAST MONTH, HAVE YOU WISHED YOU WERE DEAD OR WISHED YOU COULD GO TO SLEEP AND NOT WAKE UP?: YES

## 2024-06-27 NOTE — ED PROVIDER NOTES
ED Provider Note  [facility]      History     Chief Complaint   Patient presents with    Back Pain     Started an hour ago, no known injuries     HPI  Sadaf Ross is a 23 year old female with a history of MDD, SI with previous attempt, borderline personality disorder, intellectual disability, anxiety, and bipolar affective disorder frequent ED visits who presents to the ED with complaint of low back pain for the last couple of hours.  No trauma.  No dysuria.  No numbness, tingling, weakness.  No abdominal pain, nausea, vomiting.  She states she took Tylenol prior to coming in, but she feels improved at this time.    Past Medical History  Past Medical History:   Diagnosis Date    ADHD (attention deficit hyperactivity disorder)     Bipolar 1 disorder (H)     Borderline personality disorder (H)     Depressive disorder     Intellectual disability     Obesity     Syncope      Past Surgical History:   Procedure Laterality Date    APPENDECTOMY       acetaminophen (TYLENOL) 325 MG tablet  ARIPiprazole lauroxil ER (ARISTADA) 882 MG/3.2ML intra-muscular  bisacodyl (DULCOLAX) 5 MG EC tablet  cloNIDine (CATAPRES) 0.1 MG tablet  diclofenac (VOLTAREN) 1 % topical gel  dicyclomine (BENTYL) 20 MG tablet  divalproex sodium extended-release (DEPAKOTE ER) 250 MG 24 hr tablet  docusate sodium (COLACE) 50 MG capsule  famotidine (PEPCID) 20 MG tablet  FLUoxetine (PROZAC) 40 MG capsule  hydrOXYzine (ATARAX) 50 MG tablet  levothyroxine (SYNTHROID/LEVOTHROID) 25 MCG tablet  loperamide (IMODIUM A-D) 2 MG tablet  OLANZapine zydis (ZYPREXA) 5 MG ODT  ondansetron (ZOFRAN) 4 MG tablet  pantoprazole (PROTONIX) 40 MG EC tablet  phenazopyridine (PYRIDIUM) 100 MG tablet  promethazine (PHENERGAN) 12.5 MG tablet  senna-docusate (SENOKOT-S/PERICOLACE) 8.6-50 MG tablet  traZODone (DESYREL) 50 MG tablet  Vitamin D, Cholecalciferol, 25 MCG (1000 UT) TABS      Allergies   Allergen Reactions    Penicillins Rash and Unknown      Family History  Family History   Problem Relation Age of Onset    Diabetes Type 1 Father     Cancer Paternal Grandfather      Social History   Social History     Tobacco Use    Smoking status: Some Days     Current packs/day: 0.25     Average packs/day: 0.3 packs/day for 5.0 years (1.3 ttl pk-yrs)     Types: Cigarettes, Vaping Device    Smokeless tobacco: Former   Substance Use Topics    Alcohol use: No    Drug use: No      A medically appropriate review of systems was performed with pertinent positives and negatives noted in the HPI, and all other systems negative.    Physical Exam   BP: 139/83  Pulse: 83  Temp: 98  F (36.7  C)  Resp: 18  SpO2: 100 %  Physical Exam  Constitutional:       General: She is not in acute distress.     Appearance: Normal appearance. She is not toxic-appearing.   HENT:      Head: Atraumatic.      Mouth/Throat:      Mouth: Mucous membranes are moist.   Eyes:      General: No scleral icterus.     Conjunctiva/sclera: Conjunctivae normal.   Cardiovascular:      Rate and Rhythm: Normal rate.      Heart sounds: Normal heart sounds.   Pulmonary:      Effort: No respiratory distress.      Breath sounds: Normal breath sounds.   Abdominal:      Palpations: Abdomen is soft.      Tenderness: There is no abdominal tenderness.   Musculoskeletal:         General: Tenderness present. No deformity.      Cervical back: Neck supple.        Back:    Skin:     General: Skin is warm.      Findings: No rash.   Neurological:      Mental Status: She is alert.           ED Course, Procedures, & Data      Procedures            No results found for any visits on 06/27/24.  Medications - No data to display  Labs Ordered and Resulted from Time of ED Arrival to Time of ED Departure - No data to display  No orders to display          Critical care was not performed.     Medical Decision Making  The patient's presentation was of moderate complexity (acute back pain with complicated history).    The patient's  evaluation involved:  review of external note(s) from 1 sources (previous note)    The patient's management necessitated only low risk treatment.    Assessment & Plan    This is the patient's third ED visit in the last 24 hours.  I saw her yesterday with bilateral flank pain.  The location of the pain is a little bit different than it was yesterday.  Seems that the pain she had yesterday is now resolved.  She was here earlier with bilateral knee pain.  She states that the back pain started after that.  She was given Tylenol prior to coming in, admits she feels somewhat improved at this time.  No trauma.  No infectious signs or symptoms.  Nursing reports that on her previous visit tonight she indicated to them that she was feeling lonely.  She does have very frequent ED visits, seems to like coming to the ED.  No signs or symptoms concerning for cauda equina syndrome.  I do not think she needs an x-ray.  I do not think she needs any further treatment at this point as she is feeling improved.  She is encouraged to continue Tylenol, will be discharged back to her group home.    Dictation Disclaimer: Some of this Note has been completed with voice-recognition dictation software. Although errors are generally corrected real-time, there is the potential for a rare error to be present in the completed chart.      I have reviewed the nursing notes. I have reviewed the findings, diagnosis, plan and need for follow up with the patient.    Discharge Medication List as of 6/27/2024  2:53 AM          Final diagnoses:   Acute low back pain without sciatica, unspecified back pain laterality       Lynne Nieves  [facility] EMERGENCY DEPARTMENT  6/27/2024     Lynne Nieves MD  06/27/24 0343

## 2024-06-27 NOTE — ED TRIAGE NOTES
Triage Assessment (Adult)       Row Name 06/27/24 0142          Triage Assessment    Airway WDL WDL        Respiratory WDL    Respiratory WDL WDL        Skin Circulation/Temperature WDL    Skin Circulation/Temperature WDL WDL        Cardiac WDL    Cardiac WDL WDL        Peripheral/Neurovascular WDL    Peripheral Neurovascular WDL WDL        Cognitive/Neuro/Behavioral WDL    Cognitive/Neuro/Behavioral WDL WDL

## 2024-07-01 ENCOUNTER — HOSPITAL ENCOUNTER (EMERGENCY)
Facility: CLINIC | Age: 24
Discharge: HOME | End: 2024-07-01
Attending: FAMILY MEDICINE | Admitting: FAMILY MEDICINE
Payer: MEDICARE

## 2024-07-01 VITALS
HEART RATE: 72 BPM | TEMPERATURE: 97.9 F | OXYGEN SATURATION: 99 % | RESPIRATION RATE: 16 BRPM | DIASTOLIC BLOOD PRESSURE: 83 MMHG | SYSTOLIC BLOOD PRESSURE: 128 MMHG

## 2024-07-01 DIAGNOSIS — K08.89 PAIN, DENTAL: ICD-10-CM

## 2024-07-01 PROCEDURE — 99284 EMERGENCY DEPT VISIT MOD MDM: CPT | Performed by: FAMILY MEDICINE

## 2024-07-01 PROCEDURE — 250N000011 HC RX IP 250 OP 636: Performed by: FAMILY MEDICINE

## 2024-07-01 PROCEDURE — 99283 EMERGENCY DEPT VISIT LOW MDM: CPT | Performed by: FAMILY MEDICINE

## 2024-07-01 PROCEDURE — 96372 THER/PROPH/DIAG INJ SC/IM: CPT | Performed by: FAMILY MEDICINE

## 2024-07-01 RX ORDER — IBUPROFEN 800 MG/1
800 TABLET, FILM COATED ORAL EVERY 8 HOURS PRN
Qty: 15 TABLET | Refills: 0 | Status: SHIPPED | OUTPATIENT
Start: 2024-07-01 | End: 2024-07-06

## 2024-07-01 RX ORDER — KETOROLAC TROMETHAMINE 15 MG/ML
15 INJECTION, SOLUTION INTRAMUSCULAR; INTRAVENOUS ONCE
Status: COMPLETED | OUTPATIENT
Start: 2024-07-01 | End: 2024-07-01

## 2024-07-01 RX ADMIN — KETOROLAC TROMETHAMINE 15 MG: 15 INJECTION, SOLUTION INTRAMUSCULAR; INTRAVENOUS at 16:08

## 2024-07-01 ASSESSMENT — ACTIVITIES OF DAILY LIVING (ADL): ADLS_ACUITY_SCORE: 39

## 2024-07-02 NOTE — ED PROVIDER NOTES
ED Provider Note  [facility]      History     Chief Complaint   Patient presents with    Mouth Problem     Painful when pt chews, complains of sensitive teeth. Would like guidance on what to do.      HPI  Sadaf Ross is a 23 year old female who presents emergency room with complaint of painful sensitive teeth in the lower jaw no bleeding of the gums no swelling no trismus no difficulty with swallowing.  Patient denies any trauma to the region to states that these teeth seem to be more sensitive than usual.    Past Medical History  Past Medical History:   Diagnosis Date    ADHD (attention deficit hyperactivity disorder)     Bipolar 1 disorder (H)     Borderline personality disorder (H)     Depressive disorder     Intellectual disability     Obesity     Syncope      Past Surgical History:   Procedure Laterality Date    APPENDECTOMY       acetaminophen (TYLENOL) 325 MG tablet  ARIPiprazole lauroxil ER (ARISTADA) 882 MG/3.2ML intra-muscular  cloNIDine (CATAPRES) 0.1 MG tablet  divalproex sodium extended-release (DEPAKOTE ER) 250 MG 24 hr tablet  FLUoxetine (PROZAC) 40 MG capsule  hydrOXYzine (ATARAX) 50 MG tablet  ibuprofen (ADVIL/MOTRIN) 800 MG tablet  levothyroxine (SYNTHROID/LEVOTHROID) 25 MCG tablet  loperamide (IMODIUM A-D) 2 MG tablet  OLANZapine zydis (ZYPREXA) 5 MG ODT  ondansetron (ZOFRAN) 4 MG tablet  traZODone (DESYREL) 50 MG tablet  Vitamin D, Cholecalciferol, 25 MCG (1000 UT) TABS  bisacodyl (DULCOLAX) 5 MG EC tablet  diclofenac (VOLTAREN) 1 % topical gel  dicyclomine (BENTYL) 20 MG tablet  docusate sodium (COLACE) 50 MG capsule  famotidine (PEPCID) 20 MG tablet  pantoprazole (PROTONIX) 40 MG EC tablet  phenazopyridine (PYRIDIUM) 100 MG tablet  promethazine (PHENERGAN) 12.5 MG tablet  senna-docusate (SENOKOT-S/PERICOLACE) 8.6-50 MG tablet      Allergies   Allergen Reactions    Penicillins Rash and Unknown     Family History  Family History   Problem Relation Age of Onset     Diabetes Type 1 Father     Cancer Paternal Grandfather      Social History   Social History     Tobacco Use    Smoking status: Some Days     Current packs/day: 0.25     Average packs/day: 0.3 packs/day for 5.0 years (1.3 ttl pk-yrs)     Types: Cigarettes, Vaping Device    Smokeless tobacco: Former   Substance Use Topics    Alcohol use: No    Drug use: No      A medically appropriate review of systems was performed with pertinent positives and negatives noted in the HPI, and all other systems negative.    Physical Exam   BP: 128/83  Pulse: 72  Temp: 97.9  F (36.6  C)  Resp: 16  SpO2: 99 %  Physical Exam  Constitutional:       General: She is not in acute distress.     Appearance: Normal appearance. She is not diaphoretic.   HENT:      Head: Atraumatic.      Mouth/Throat:      Mouth: Mucous membranes are moist.      Comments: Subjective pain along the gumline of the lower jaw no evidence of inflammation swelling or infection.  Eyes:      General: No scleral icterus.     Conjunctiva/sclera: Conjunctivae normal.   Cardiovascular:      Rate and Rhythm: Normal rate.      Heart sounds: Normal heart sounds.   Pulmonary:      Effort: No respiratory distress.      Breath sounds: Normal breath sounds.   Abdominal:      General: Abdomen is flat.   Musculoskeletal:      Cervical back: Neck supple.   Skin:     General: Skin is warm.      Findings: No rash.   Neurological:      Mental Status: She is alert.           ED Course, Procedures, & Data      Procedures         No results found for any visits on 07/01/24.  Medications   ketorolac (TORADOL) injection 15 mg (15 mg Intramuscular $Given 7/1/24 1602)     Labs Ordered and Resulted from Time of ED Arrival to Time of ED Departure - No data to display  No orders to display          Critical care was not performed.     Medical Decision Making  The patient's presentation was of low complexity (an acute and uncomplicated illness or injury).    The patient's evaluation  involved:  history and exam without other MDM data elements    The patient's management necessitated only low risk treatment.    Assessment & Plan        I have reviewed the nursing notes. I have reviewed the findings, diagnosis, plan and need for follow up with the patient.    Discharge Medication List as of 7/1/2024  4:12 PM        START taking these medications    Details   ibuprofen (ADVIL/MOTRIN) 800 MG tablet Take 1 tablet (800 mg) by mouth every 8 hours as needed for moderate pain, Disp-15 tablet, R-0, E-Prescribe             Final diagnoses:   Pain, dental         [facility] EMERGENCY DEPARTMENT  7/1/2024     Robert Olea MD  07/02/24 [id number]

## 2024-07-03 ENCOUNTER — HOSPITAL ENCOUNTER (EMERGENCY)
Facility: CLINIC | Age: 24
Discharge: NURSING, REHAB OR LONG-TERM CARE | End: 2024-07-03
Attending: EMERGENCY MEDICINE | Admitting: EMERGENCY MEDICINE
Payer: MEDICARE

## 2024-07-03 VITALS
OXYGEN SATURATION: 98 % | TEMPERATURE: 97.7 F | RESPIRATION RATE: 16 BRPM | DIASTOLIC BLOOD PRESSURE: 84 MMHG | HEART RATE: 81 BPM | SYSTOLIC BLOOD PRESSURE: 135 MMHG

## 2024-07-03 DIAGNOSIS — R19.7 DIARRHEA, UNSPECIFIED TYPE: ICD-10-CM

## 2024-07-03 PROCEDURE — 99283 EMERGENCY DEPT VISIT LOW MDM: CPT | Performed by: EMERGENCY MEDICINE

## 2024-07-03 ASSESSMENT — ACTIVITIES OF DAILY LIVING (ADL): ADLS_ACUITY_SCORE: 39

## 2024-07-03 NOTE — ED TRIAGE NOTES
Triage Assessment (Adult)       Row Name 07/03/24 0245          Triage Assessment    Airway WDL WDL        Respiratory WDL    Respiratory WDL WDL        Skin Circulation/Temperature WDL    Skin Circulation/Temperature WDL WDL        Cardiac WDL    Cardiac WDL WDL        Peripheral/Neurovascular WDL    Peripheral Neurovascular WDL WDL        Cognitive/Neuro/Behavioral WDL    Cognitive/Neuro/Behavioral WDL WDL

## 2024-07-03 NOTE — ED PROVIDER NOTES
ED Provider Note  [facility]      History     Chief Complaint   Patient presents with    Diarrhea     Going every 2 hours     HPI  Sadaf Ross is a 23 year old female with a history of MDD, SI with previous attempt, borderline personality disorder, intellectual disability, anxiety, and bipolar affective disorder frequent ED visits presents to the ED with complaint of diarrhea.  She states she had 1 episode at around 10 PM, another after 2 AM.  She states that while she was having the diarrhea she was having some cramping, though states this is now completely resolved.  No nausea or vomiting.  No fever or any other complaints.  No reported dysuria.    Past Medical History  Past Medical History:   Diagnosis Date    ADHD (attention deficit hyperactivity disorder)     Bipolar 1 disorder (H)     Borderline personality disorder (H)     Depressive disorder     Intellectual disability     Obesity     Syncope      Past Surgical History:   Procedure Laterality Date    APPENDECTOMY       acetaminophen (TYLENOL) 325 MG tablet  ARIPiprazole lauroxil ER (ARISTADA) 882 MG/3.2ML intra-muscular  bisacodyl (DULCOLAX) 5 MG EC tablet  cloNIDine (CATAPRES) 0.1 MG tablet  diclofenac (VOLTAREN) 1 % topical gel  dicyclomine (BENTYL) 20 MG tablet  divalproex sodium extended-release (DEPAKOTE ER) 250 MG 24 hr tablet  docusate sodium (COLACE) 50 MG capsule  famotidine (PEPCID) 20 MG tablet  FLUoxetine (PROZAC) 40 MG capsule  hydrOXYzine (ATARAX) 50 MG tablet  ibuprofen (ADVIL/MOTRIN) 800 MG tablet  levothyroxine (SYNTHROID/LEVOTHROID) 25 MCG tablet  loperamide (IMODIUM A-D) 2 MG tablet  OLANZapine zydis (ZYPREXA) 5 MG ODT  ondansetron (ZOFRAN) 4 MG tablet  pantoprazole (PROTONIX) 40 MG EC tablet  phenazopyridine (PYRIDIUM) 100 MG tablet  promethazine (PHENERGAN) 12.5 MG tablet  senna-docusate (SENOKOT-S/PERICOLACE) 8.6-50 MG tablet  traZODone (DESYREL) 50 MG tablet  Vitamin D, Cholecalciferol, 25 MCG (1000  UT) TABS      Allergies   Allergen Reactions    Penicillins Rash and Unknown     Family History  Family History   Problem Relation Age of Onset    Diabetes Type 1 Father     Cancer Paternal Grandfather      Social History   Social History     Tobacco Use    Smoking status: Some Days     Current packs/day: 0.25     Average packs/day: 0.3 packs/day for 5.0 years (1.3 ttl pk-yrs)     Types: Cigarettes, Vaping Device    Smokeless tobacco: Former   Substance Use Topics    Alcohol use: No    Drug use: No      A medically appropriate review of systems was performed with pertinent positives and negatives noted in the HPI, and all other systems negative.    Physical Exam   BP: 135/84  Pulse: 81  Temp: 97.7  F (36.5  C)  Resp: 16  SpO2: 98 %  Physical Exam  Constitutional:       General: She is not in acute distress.     Appearance: Normal appearance. She is not toxic-appearing.   HENT:      Head: Atraumatic.   Eyes:      General: No scleral icterus.     Conjunctiva/sclera: Conjunctivae normal.   Cardiovascular:      Rate and Rhythm: Normal rate.      Heart sounds: Normal heart sounds.   Pulmonary:      Effort: No respiratory distress.      Breath sounds: Normal breath sounds.   Abdominal:      Palpations: Abdomen is soft.      Tenderness: There is no abdominal tenderness.   Musculoskeletal:         General: No deformity.      Cervical back: Neck supple.   Skin:     General: Skin is warm.      Findings: No rash.   Neurological:      Mental Status: She is alert.           ED Course, Procedures, & Data      Procedures            No results found for any visits on 07/03/24.  Medications - No data to display  Labs Ordered and Resulted from Time of ED Arrival to Time of ED Departure - No data to display  No orders to display          Critical care was not performed.     Medical Decision Making  The patient's presentation was of moderate complexity (an acute illness with systemic symptoms).    The patient's evaluation  involved:  review of external note(s) from 1 sources (previous note)  review of 1 test result(s) ordered prior to this encounter (previous results)    The patient's management necessitated only low risk treatment.    Assessment & Plan    The patient has very frequent ED visits for a variety of complaints.  She does complain of 2 episodes of diarrhea tonight, though is currently asymptomatic.  No nausea or vomiting.  She states she did have recent antibiotics, but she cannot recall for what.  However, with only 2 episodes of diarrhea, she technically does not meet the threshold set by her hospital for C. difficile testing.  I do not think she requires testing at this time.  Her abdomen is completely benign.  Basic metabolic panel within the last couple of weeks was unremarkable.  She is encouraged to drink plenty of fluids.  She is encouraged to follow-up with her outpatient provider.  She is encouraged to eat a light diet if she has any ongoing concerns.  She may return with worsening or concerns.  I think she safe for discharge at this time with watchful waiting.    Dictation Disclaimer: Some of this Note has been completed with voice-recognition dictation software. Although errors are generally corrected real-time, there is the potential for a rare error to be present in the completed chart.      I have reviewed the nursing notes. I have reviewed the findings, diagnosis, plan and need for follow up with the patient.    New Prescriptions    No medications on file       Final diagnoses:   Diarrhea, unspecified type       Lynne Nieves  [facility] EMERGENCY DEPARTMENT  7/3/2024     Lynne Nieves MD  07/03/24 0308

## 2024-07-03 NOTE — DISCHARGE INSTRUCTIONS
Drink plenty of fluids. Eat a light diet if you are not feeling well. Follow up with your clinic doctor in a few days if not improving.

## 2024-07-03 NOTE — ED NOTES
Bed: ED18  Expected date: 7/3/24  Expected time: 2:38 AM  Means of arrival:   Comments:  Gaby Aponte 824 24 y/o F Diarrhea

## 2024-07-09 ENCOUNTER — HOSPITAL ENCOUNTER (EMERGENCY)
Facility: CLINIC | Age: 24
Discharge: HOME | End: 2024-07-09
Admitting: PHYSICIAN ASSISTANT
Payer: MEDICARE

## 2024-07-09 ENCOUNTER — HOSPITAL ENCOUNTER (EMERGENCY)
Facility: CLINIC | Age: 24
Discharge: NURSING, REHAB OR LONG-TERM CARE | End: 2024-07-09
Attending: EMERGENCY MEDICINE | Admitting: EMERGENCY MEDICINE
Payer: MEDICARE

## 2024-07-09 VITALS
RESPIRATION RATE: 16 BRPM | DIASTOLIC BLOOD PRESSURE: 82 MMHG | HEART RATE: 90 BPM | OXYGEN SATURATION: 100 % | TEMPERATURE: 97.8 F | SYSTOLIC BLOOD PRESSURE: 134 MMHG

## 2024-07-09 VITALS — RESPIRATION RATE: 14 BRPM

## 2024-07-09 DIAGNOSIS — F41.1 ANXIETY REACTION: ICD-10-CM

## 2024-07-09 DIAGNOSIS — R23.4 SCAB: ICD-10-CM

## 2024-07-09 PROCEDURE — 99285 EMERGENCY DEPT VISIT HI MDM: CPT | Performed by: EMERGENCY MEDICINE

## 2024-07-09 PROCEDURE — 99283 EMERGENCY DEPT VISIT LOW MDM: CPT | Performed by: PHYSICIAN ASSISTANT

## 2024-07-09 PROCEDURE — 99282 EMERGENCY DEPT VISIT SF MDM: CPT | Performed by: PHYSICIAN ASSISTANT

## 2024-07-09 PROCEDURE — 99284 EMERGENCY DEPT VISIT MOD MDM: CPT | Performed by: EMERGENCY MEDICINE

## 2024-07-09 RX ORDER — HYDROXYZINE HYDROCHLORIDE 50 MG/1
50 TABLET, FILM COATED ORAL ONCE
Status: COMPLETED | OUTPATIENT
Start: 2024-07-09 | End: 2024-07-09

## 2024-07-09 ASSESSMENT — ACTIVITIES OF DAILY LIVING (ADL)
ADLS_ACUITY_SCORE: 39

## 2024-07-09 NOTE — DISCHARGE INSTRUCTIONS
Here in the emergency room you have reassuring evaluation.  We discussed that you do have several superficial open wounds to the top part of your head likely from scratching.  You do not have any findings for infection.  Try to keep the area clean and avoid exposure to sweat, other irritants.  We discussed antibiotic ointment such as bacitracin to keep the area protected until it heals over.  Do not pick at the skin.    If you develop any new or worsening symptoms, is important to return right away to the emergency department for further evaluation and management.

## 2024-07-09 NOTE — ED PROVIDER NOTES
ED Provider Note  [facility]      History     Chief Complaint   Patient presents with    Pruritis     HPI  Sadaf Ross is a 23 year old female who presents the emergency department with concerns for wound to the upper scalp with associated itching.  Patient presents alone.  She states about 2 days ago she started noticing a wound to her scalp which is pruritic in character.  She notes she recently got back from vacation and also got a haircut recently.  She denies any drainage from the area no other similar rashes.  She denies systemic symptoms including any associated fevers.  She denies any other medical concerns.  She has been wearing her hat and trying to avoid scratching the skin is much as possible.  She has not been applying anything onto the affected skin thus far.    Past Medical History  Past Medical History:   Diagnosis Date    ADHD (attention deficit hyperactivity disorder)     Bipolar 1 disorder (H)     Borderline personality disorder (H)     Depressive disorder     Intellectual disability     Obesity     Syncope      Past Surgical History:   Procedure Laterality Date    APPENDECTOMY       acetaminophen (TYLENOL) 325 MG tablet  ARIPiprazole lauroxil ER (ARISTADA) 882 MG/3.2ML intra-muscular  bisacodyl (DULCOLAX) 5 MG EC tablet  cloNIDine (CATAPRES) 0.1 MG tablet  diclofenac (VOLTAREN) 1 % topical gel  dicyclomine (BENTYL) 20 MG tablet  divalproex sodium extended-release (DEPAKOTE ER) 250 MG 24 hr tablet  docusate sodium (COLACE) 50 MG capsule  famotidine (PEPCID) 20 MG tablet  FLUoxetine (PROZAC) 40 MG capsule  hydrOXYzine (ATARAX) 50 MG tablet  levothyroxine (SYNTHROID/LEVOTHROID) 25 MCG tablet  loperamide (IMODIUM A-D) 2 MG tablet  OLANZapine zydis (ZYPREXA) 5 MG ODT  ondansetron (ZOFRAN) 4 MG tablet  pantoprazole (PROTONIX) 40 MG EC tablet  phenazopyridine (PYRIDIUM) 100 MG tablet  promethazine (PHENERGAN) 12.5 MG tablet  senna-docusate (SENOKOT-S/PERICOLACE) 8.6-50  MG tablet  traZODone (DESYREL) 50 MG tablet  Vitamin D, Cholecalciferol, 25 MCG (1000 UT) TABS      Allergies   Allergen Reactions    Penicillins Rash and Unknown     Family History  Family History   Problem Relation Age of Onset    Diabetes Type 1 Father     Cancer Paternal Grandfather      Social History   Social History     Tobacco Use    Smoking status: Some Days     Current packs/day: 0.25     Average packs/day: 0.3 packs/day for 5.0 years (1.3 ttl pk-yrs)     Types: Cigarettes, Vaping Device    Smokeless tobacco: Former   Substance Use Topics    Alcohol use: No    Drug use: No      A medically appropriate review of systems was performed with pertinent positives and negatives noted in the HPI, and all other systems negative.    Physical Exam   BP: 134/82  Pulse: 90  Temp: 97.8  F (36.6  C)  Resp: 16  SpO2: 100 %  Physical Exam  GENERAL APPEARANCE: The patient is well developed, well appearing, and in no acute distress.  HEAD:  Normocephalic and atraumatic.   EENT: Voice normal.  NECK: Trachea is midline.No lymphadenopathy or tenderness.  LUNGS: Breath sounds are equal and clear bilaterally. No wheezes, rhonchi, or rales.  HEART: Regular rate and normal rhythm.    EXTREMITIES: No cyanosis, clubbing, or edema.  NEUROLOGIC: No focal sensory or motor deficits are noted.  PSYCHIATRIC: The patient is awake, alert.  Appropriate mood and affect.  SKIN: Examination of the scalp shows several superficial open lesions without any associated fluctuance erythema tenderness.  Each lesion is less than 1 mm in diameter, consistent with superficial excoriation.  No other similar-appearing lesions noted.      ED Course, Procedures, & Data      Procedures                No results found for any visits on 07/09/24.  Medications - No data to display  Labs Ordered and Resulted from Time of ED Arrival to Time of ED Departure - No data to display  No orders to display          Critical care was not performed.     Medical Decision  Making  The patient's presentation was of straightforward complexity (a clearly self-limited or minor problem).    The patient's evaluation involved:  history and exam without other MDM data elements    The patient's management necessitated only low risk treatment.    Assessment & Plan    This is a 23-year-old female present with concerns for pruritic lesions to her head starting 2 days ago in the absence of injury, other rash, systemic symptoms.  She has been scratching the affected lesions.  On presentation vitals within reference range.  On exam she has several superficial excoriation wounds to her head without associated findings to suggest infection, abscess, vesicular involvement.  This is not consistent with urticaria.  She has no mucosal involvement.  Suspect this is more likely some contact irritation.  She also did recently get a haircut she may have had some superficial ingrown hairs as well which were irritated with this.  Encouraged her to refrain from scratching, we discussed keeping the area clean and avoiding sweat or other irritants getting into the wound, and I did offer her bacitracin ointment to put on the affected area which she declined stating she has some at home.  She was made aware of return precautions.  She will be discharged.  Patient has no other questions or concerns at this time.  Red flag signs were addressed, and they were in agreement with the patient care plan provided.    I have reviewed the nursing notes. I have reviewed the findings, diagnosis, plan and need for follow up with the patient.    New Prescriptions    No medications on file       Final diagnoses:   Scab - scalp       [facility] EMERGENCY DEPARTMENT  7/9/2024     Gaby Mercedes PA-C  07/09/24 0998

## 2024-07-09 NOTE — ED TRIAGE NOTES
Patient has some small open areas on top of her head. Reports it is itchy, having a hard time not picking at it.

## 2024-07-10 ENCOUNTER — HOSPITAL ENCOUNTER (EMERGENCY)
Facility: CLINIC | Age: 24
Discharge: HOME | End: 2024-07-11
Attending: FAMILY MEDICINE | Admitting: FAMILY MEDICINE
Payer: MEDICARE

## 2024-07-10 VITALS
HEART RATE: 67 BPM | SYSTOLIC BLOOD PRESSURE: 116 MMHG | OXYGEN SATURATION: 98 % | DIASTOLIC BLOOD PRESSURE: 79 MMHG | RESPIRATION RATE: 20 BRPM | TEMPERATURE: 99.4 F

## 2024-07-10 DIAGNOSIS — F79 INTELLECTUAL DISABILITY: Chronic | ICD-10-CM

## 2024-07-10 DIAGNOSIS — R45.88 NONSUICIDAL SELF-INJURY (H): ICD-10-CM

## 2024-07-10 DIAGNOSIS — F41.9 ANXIETY: ICD-10-CM

## 2024-07-10 DIAGNOSIS — F60.3 BORDERLINE PERSONALITY DISORDER (H): Chronic | ICD-10-CM

## 2024-07-10 PROCEDURE — 250N000013 HC RX MED GY IP 250 OP 250 PS 637: Performed by: FAMILY MEDICINE

## 2024-07-10 PROCEDURE — 99283 EMERGENCY DEPT VISIT LOW MDM: CPT | Performed by: FAMILY MEDICINE

## 2024-07-10 PROCEDURE — 99284 EMERGENCY DEPT VISIT MOD MDM: CPT | Performed by: FAMILY MEDICINE

## 2024-07-10 RX ORDER — LORAZEPAM 1 MG/1
1 TABLET ORAL ONCE
Status: COMPLETED | OUTPATIENT
Start: 2024-07-10 | End: 2024-07-10

## 2024-07-10 RX ORDER — OLANZAPINE 10 MG/1
10 TABLET, ORALLY DISINTEGRATING ORAL ONCE
Status: COMPLETED | OUTPATIENT
Start: 2024-07-10 | End: 2024-07-10

## 2024-07-10 RX ADMIN — OLANZAPINE 10 MG: 10 TABLET, ORALLY DISINTEGRATING ORAL at 22:51

## 2024-07-10 RX ADMIN — LORAZEPAM 1 MG: 1 TABLET ORAL at 22:51

## 2024-07-10 ASSESSMENT — ACTIVITIES OF DAILY LIVING (ADL)
ADLS_ACUITY_SCORE: 39
ADLS_ACUITY_SCORE: 39

## 2024-07-10 ASSESSMENT — COLUMBIA-SUICIDE SEVERITY RATING SCALE - C-SSRS
1. IN THE PAST MONTH, HAVE YOU WISHED YOU WERE DEAD OR WISHED YOU COULD GO TO SLEEP AND NOT WAKE UP?: YES
2. HAVE YOU ACTUALLY HAD ANY THOUGHTS OF KILLING YOURSELF IN THE PAST MONTH?: YES
5. HAVE YOU STARTED TO WORK OUT OR WORKED OUT THE DETAILS OF HOW TO KILL YOURSELF? DO YOU INTEND TO CARRY OUT THIS PLAN?: NO
3. HAVE YOU BEEN THINKING ABOUT HOW YOU MIGHT KILL YOURSELF?: YES
4. HAVE YOU HAD THESE THOUGHTS AND HAD SOME INTENTION OF ACTING ON THEM?: NO
6. HAVE YOU EVER DONE ANYTHING, STARTED TO DO ANYTHING, OR PREPARED TO DO ANYTHING TO END YOUR LIFE?: YES

## 2024-07-10 NOTE — DISCHARGE INSTRUCTIONS
Thank you for coming to the [facility] Emergency Department.     Please continue your medications as usual.     Follow up with your primary care provider.

## 2024-07-10 NOTE — ED NOTES
Pt in the room laying on her stomach refused to acknowledge writer, refused to answer any triage questions. No VS taken due to refusal.  1:1 attendant at BS

## 2024-07-10 NOTE — ED NOTES
Per MD group staff has not been made aware that patient has been discharged.  Per patient she does not know the phone number of the group home.   Patient gave the group home's address: [address] [place] [address]. Patient cellphone # [phone].

## 2024-07-10 NOTE — ED PROVIDER NOTES
[facility] EMERGENCY DEPARTMENT ([facility])    7/09/24      ED PROVIDER NOTE       History     Chief Complaint   Patient presents with    Suicidal     BIBA anxious, panicking, SI with plan to bite self. C/O abdominal pain - chronic. Per report pt has taken all of her meds today.     HPI  Sadaf Ross is a  23-year-old female known to us here in the emergency department has a history of bipolar 1 borderline personality and intellectual disability.  Comes from her group home.  Is her second ED visit today.  She comes in tonight because she says that she took a nap and when she woke up around 6:00, she has been struggling to feel calm.  She feels very anxious she feels very tremoring and she is having thoughts of hurting herself by biting herself in the stomach or punching things.  She has not actually harmed herself she states that she has been taking all of her medications as prescribed but is not clear if she took any PRNs this evening it was during this escalated.  Her evaluation earlier today was for scab on her scalp not similar to the symptoms.  We see her with some regularity but is usually for a constellation of medical complaints or anxiety it has been quite a while since she has complained of suicidal thoughts.      This part of the medical record was transcribed by Cindy Chandra Scribe, from a dictation done by Christine Murphy MD.    ED CARE PLAN     The pt has become increasingly disruptive in the Emergency Department.  The pt will yell, demand, and scream and disrupt the milieu, and this happens before she finds out the recommendation to send her back to her group home.  Today, the pt postured toward the ED doctor and threatened to  punch him.   During one of her ED visits over the past week, the pt befriended another patient who is waiting for an inpatient bed.  The pt has been texting and contacting this patient.  There is concern that one of the reasons the pt is coming to  the ED is to interact with this patient.        An Emergency Department Care Plan is being sought in order to reduce and extinguish the pt s numerous ED visits.  The pt does have an active outpatient team that the pt can utilize for support and lives in a group home with 24/7 staffing.  Typically, the pt calls 911, not group home staff.  Additionally, [facility] have attempted to recommend additionally services, such as day treatment, and the pt refuses the recommendations.  It appears that the pt comes to the ED, requesting admission as she does not like her group home.        To that end, it is recommended that if the pt returns to the Emergency Department, she be triaged and if there is not any acute new symptoms, both medically and mental health-wise, the group home be called and informed that the pt is returning and medical transport be set up for her return.  If the pt becomes dysregulated, the pt should be offered/given appropriate medications.  This should not hinder her return to her group home.        9/27/22, Nuria Deleon [facility]; Dr. Luis De Anda; [facility]    Past Medical History  Past Medical History:   Diagnosis Date    ADHD (attention deficit hyperactivity disorder)     Bipolar 1 disorder (H)     Borderline personality disorder (H)     Depressive disorder     Intellectual disability     Obesity     Syncope      Past Surgical History:   Procedure Laterality Date    APPENDECTOMY       acetaminophen (TYLENOL) 325 MG tablet  ARIPiprazole lauroxil ER (ARISTADA) 882 MG/3.2ML intra-muscular  bisacodyl (DULCOLAX) 5 MG EC tablet  cloNIDine (CATAPRES) 0.1 MG tablet  diclofenac (VOLTAREN) 1 % topical gel  dicyclomine (BENTYL) 20 MG tablet  divalproex sodium extended-release (DEPAKOTE ER) 250 MG 24 hr tablet  docusate sodium (COLACE) 50 MG capsule  famotidine (PEPCID) 20 MG tablet  FLUoxetine (PROZAC) 40 MG capsule  hydrOXYzine (ATARAX) 50 MG tablet  levothyroxine (SYNTHROID/LEVOTHROID) 25 MCG  tablet  loperamide (IMODIUM A-D) 2 MG tablet  OLANZapine zydis (ZYPREXA) 5 MG ODT  ondansetron (ZOFRAN) 4 MG tablet  pantoprazole (PROTONIX) 40 MG EC tablet  phenazopyridine (PYRIDIUM) 100 MG tablet  promethazine (PHENERGAN) 12.5 MG tablet  senna-docusate (SENOKOT-S/PERICOLACE) 8.6-50 MG tablet  traZODone (DESYREL) 50 MG tablet  Vitamin D, Cholecalciferol, 25 MCG (1000 UT) TABS      Allergies   Allergen Reactions    Penicillins Rash and Unknown     Family History  Family History   Problem Relation Age of Onset    Diabetes Type 1 Father     Cancer Paternal Grandfather      Social History   Social History     Tobacco Use    Smoking status: Some Days     Current packs/day: 0.25     Average packs/day: 0.3 packs/day for 5.0 years (1.3 ttl pk-yrs)     Types: Cigarettes, Vaping Device    Smokeless tobacco: Former   Substance Use Topics    Alcohol use: No    Drug use: No      A medically appropriate review of systems was performed with pertinent positives and negatives noted in the HPI, and all other systems negative.    Physical Exam   BP: (S)  (pt refused)  Resp: 14  Physical Exam  Gen:A&Ox3, mildly agitated but cooperative with history and exam, not threatening to staff  HEENT:PERRL, no facial tenderness or wounds, head atraumatic, mucous membranes moist  Back: no CVA tenderness  CV:RRR without murmurs  PULM:Clear to auscultation bilaterally  Abd:soft, nontender, nondistended. Bowel sounds present and normal  UE:No traumatic injuries, skin normal  LE:no traumatic injuries, skin normal  Neuro:CN II-XII intact, strength 5/5 throughout, gait stable.   Skin: no rashes or ecchymoses     ED Course, Procedures, & Data      Procedures       No results found for any visits on 07/09/24.  Medications   hydrOXYzine HCl (ATARAX) tablet 50 mg (50 mg Oral Not Given 7/9/24 2104)     Labs Ordered and Resulted from Time of ED Arrival to Time of ED Departure - No data to display  No orders to display          Critical care was not  performed.     Medical Decision Making  The patient's presentation was of high complexity (a chronic illness severe exacerbation, progression, or side effect of treatment).    The patient's evaluation involved:  discussion of management or test interpretation with another health professional (DEC team)    The patient's management necessitated only low risk treatment.    Assessment & Plan    MDM:  23-year-old female presenting with anxiety with thoughts of harming herself by self injury by punching or biting.    Exam without acute injuries and no new illnesses.   Offered 50 mg oral hydroxyzine, but declined.   I discussed her case and ED care plan with the DEC team. Decided to allow pt to rest in the ED and reassess if full DEC assessment is needed.      She calmed and eventually slept. Was able to be reassessed and reports feeling better and no longer wishing to harm herself.   We decided against full DEC assessment, in accordance with her care plan.     Discharged to her group home.     I have reviewed the nursing notes. I have reviewed the findings, diagnosis, plan and need for follow up with the patient.    Discharge Medication List as of 7/9/2024 10:44 PM          Final diagnoses:   Anxiety reaction       Christine Murphy MD   [facility] EMERGENCY DEPARTMENT  7/9/2024     Christine Murphy MD  07/10/24 1120

## 2024-07-10 NOTE — ED NOTES
Spoke to Devyn from patients group and informed him that patient will be discharged. Group home is expecting the patient. Cab called and patient belongings returned to patient.

## 2024-07-10 NOTE — ED NOTES
Writer left a VM to the group home:  [phone] and [phone].  No return call received.    Report given to Densie

## 2024-07-11 ENCOUNTER — TELEPHONE (OUTPATIENT)
Dept: NURSING | Facility: CLINIC | Age: 24
End: 2024-07-11
Payer: MEDICARE

## 2024-07-11 ENCOUNTER — TELEPHONE (OUTPATIENT)
Dept: NEUROLOGY | Facility: CLINIC | Age: 24
End: 2024-07-11
Payer: MEDICARE

## 2024-07-11 ENCOUNTER — HOSPITAL ENCOUNTER (EMERGENCY)
Facility: CLINIC | Age: 24
Discharge: NURSING, REHAB OR LONG-TERM CARE | End: 2024-07-11
Attending: FAMILY MEDICINE | Admitting: FAMILY MEDICINE
Payer: MEDICARE

## 2024-07-11 VITALS
OXYGEN SATURATION: 99 % | BODY MASS INDEX: 44.29 KG/M2 | HEART RATE: 89 BPM | HEIGHT: 63 IN | SYSTOLIC BLOOD PRESSURE: 126 MMHG | TEMPERATURE: 97.9 F | RESPIRATION RATE: 18 BRPM | DIASTOLIC BLOOD PRESSURE: 82 MMHG

## 2024-07-11 DIAGNOSIS — F60.3 BORDERLINE PERSONALITY DISORDER (H): Chronic | ICD-10-CM

## 2024-07-11 DIAGNOSIS — F79 INTELLECTUAL DISABILITY: Chronic | ICD-10-CM

## 2024-07-11 DIAGNOSIS — R42 DIZZINESS: ICD-10-CM

## 2024-07-11 PROCEDURE — 99282 EMERGENCY DEPT VISIT SF MDM: CPT | Performed by: FAMILY MEDICINE

## 2024-07-11 PROCEDURE — 99283 EMERGENCY DEPT VISIT LOW MDM: CPT | Performed by: FAMILY MEDICINE

## 2024-07-11 RX ORDER — MECLIZINE HYDROCHLORIDE 25 MG/1
25 TABLET ORAL ONCE
Status: DISCONTINUED | OUTPATIENT
Start: 2024-07-11 | End: 2024-07-11 | Stop reason: HOSPADM

## 2024-07-11 RX ORDER — LORAZEPAM 1 MG/1
1 TABLET ORAL ONCE
Status: DISCONTINUED | OUTPATIENT
Start: 2024-07-11 | End: 2024-07-11 | Stop reason: HOSPADM

## 2024-07-11 ASSESSMENT — COLUMBIA-SUICIDE SEVERITY RATING SCALE - C-SSRS
4. HAVE YOU HAD THESE THOUGHTS AND HAD SOME INTENTION OF ACTING ON THEM?: YES
3. HAVE YOU BEEN THINKING ABOUT HOW YOU MIGHT KILL YOURSELF?: YES
1. IN THE PAST MONTH, HAVE YOU WISHED YOU WERE DEAD OR WISHED YOU COULD GO TO SLEEP AND NOT WAKE UP?: YES
5. HAVE YOU STARTED TO WORK OUT OR WORKED OUT THE DETAILS OF HOW TO KILL YOURSELF? DO YOU INTEND TO CARRY OUT THIS PLAN?: NO
2. HAVE YOU ACTUALLY HAD ANY THOUGHTS OF KILLING YOURSELF IN THE PAST MONTH?: YES
6. HAVE YOU EVER DONE ANYTHING, STARTED TO DO ANYTHING, OR PREPARED TO DO ANYTHING TO END YOUR LIFE?: YES

## 2024-07-11 ASSESSMENT — ACTIVITIES OF DAILY LIVING (ADL)
ADLS_ACUITY_SCORE: 39
ADLS_ACUITY_SCORE: 37

## 2024-07-11 NOTE — ED NOTES
Bed: [facility]  Expected date:   Expected time:   Means of arrival:   Comments:  Fadi 7[age]yo f to triage

## 2024-07-11 NOTE — TELEPHONE ENCOUNTER
S-(situation):   [facility] Transfer    B-(background):   Patient not established with [facility]    A-(assessment):   Patient calling to report that she is on her way to the ED for Weakness/Dizziness (going to pass out) and Suicidal.  Patient would like a wheel chair assistance.    R-(recommendations):   Confirmed with patient that going to the ED is the appropriate decision.  Advised when her  gets to the ED, they can ask the staff in the ED for wheel chair assistance.        Triage RN  [place]

## 2024-07-11 NOTE — ED TRIAGE NOTES
Back from vacation with mom and soon to be step dad recently.  Has been thinking of her grandpa and  father and stressing out.  Wants to bite herself.  Took her PRN psych meds before EMS arrival.  VSS.

## 2024-07-11 NOTE — DISCHARGE INSTRUCTIONS
Discharge back to [facility] home continue with current outpatient services.

## 2024-07-11 NOTE — ED PROVIDER NOTES
"ED Provider Note  [facility]      History     Chief Complaint   Patient presents with    Suicidal     Having a hard time thinking about dad and grandfather who are  and she is missing them. Biting right hand, bite marks are present, patient did this while waiting in the ED ahmet. Patient has a hx of cutting but last cutting was \"a couple years ago.\"    Dizziness    Generalized Weakness     \"I feel like I am going to fall out of this chair.\"     HPI  Sadaf Ross is a 23 year old female with a history of ADHD, bipolar 1 disorder, borderline personality disorder, depression, and SI with frequent ED visits who presents to the ED for evaluation of suicidal ideation, dizziness, and generalized weakness.  Patient reports she is having a hard time thinking about her dad and grandfather for  and she is missing them.    Past Medical History  Past Medical History:   Diagnosis Date    ADHD (attention deficit hyperactivity disorder)     Bipolar 1 disorder (H)     Borderline personality disorder (H)     Depressive disorder     Intellectual disability     Obesity     Syncope      Past Surgical History:   Procedure Laterality Date    APPENDECTOMY       acetaminophen (TYLENOL) 325 MG tablet  ARIPiprazole lauroxil ER (ARISTADA) 882 MG/3.2ML intra-muscular  bisacodyl (DULCOLAX) 5 MG EC tablet  cloNIDine (CATAPRES) 0.1 MG tablet  diclofenac (VOLTAREN) 1 % topical gel  dicyclomine (BENTYL) 20 MG tablet  divalproex sodium extended-release (DEPAKOTE ER) 250 MG 24 hr tablet  docusate sodium (COLACE) 50 MG capsule  famotidine (PEPCID) 20 MG tablet  FLUoxetine (PROZAC) 40 MG capsule  hydrOXYzine (ATARAX) 50 MG tablet  levothyroxine (SYNTHROID/LEVOTHROID) 25 MCG tablet  loperamide (IMODIUM A-D) 2 MG tablet  OLANZapine zydis (ZYPREXA) 5 MG ODT  ondansetron (ZOFRAN) 4 MG tablet  pantoprazole (PROTONIX) 40 MG EC tablet  phenazopyridine (PYRIDIUM) 100 MG tablet  promethazine (PHENERGAN) 12.5 MG " tablet  senna-docusate (SENOKOT-S/PERICOLACE) 8.6-50 MG tablet  traZODone (DESYREL) 50 MG tablet  Vitamin D, Cholecalciferol, 25 MCG (1000 UT) TABS      Allergies   Allergen Reactions    Penicillins Rash and Unknown     Family History  Family History   Problem Relation Age of Onset    Diabetes Type 1 Father     Cancer Paternal Grandfather      Social History   Social History     Tobacco Use    Smoking status: Former     Current packs/day: 0.25     Average packs/day: 0.3 packs/day for 5.0 years (1.3 ttl pk-yrs)     Types: Cigarettes, Vaping Device    Smokeless tobacco: Former   Substance Use Topics    Alcohol use: No    Drug use: No      A medically appropriate review of systems was performed with pertinent positives and negatives noted in the HPI, and all other systems negative.    Physical Exam   BP: 116/79  Pulse: 67  Temp: 99.4  F (37.4  C)  Resp: 20  SpO2: 98 %  Physical Exam  Constitutional:       General: She is not in acute distress.     Appearance: Normal appearance. She is not diaphoretic.   HENT:      Head: Atraumatic.      Mouth/Throat:      Mouth: Mucous membranes are moist.   Eyes:      General: No scleral icterus.     Conjunctiva/sclera: Conjunctivae normal.   Cardiovascular:      Rate and Rhythm: Normal rate.      Heart sounds: Normal heart sounds.   Pulmonary:      Effort: No respiratory distress.      Breath sounds: Normal breath sounds.   Abdominal:      General: Abdomen is flat.   Musculoskeletal:      Cervical back: Neck supple.   Skin:     General: Skin is warm.      Findings: No rash.   Neurological:      General: No focal deficit present.      Mental Status: She is alert and oriented to person, place, and time.      Sensory: No sensory deficit.      Motor: No weakness.      Coordination: Coordination normal.   Psychiatric:         Attention and Perception: She does not perceive auditory or visual hallucinations.         Mood and Affect: Mood is anxious.         Speech: Speech normal.          Behavior: Behavior is agitated.         Thought Content: Thought content is not paranoid. Thought content does not include homicidal or suicidal ideation.           ED Course, Procedures, & Data      Procedures         No results found for any visits on 07/10/24.  Medications   OLANZapine zydis (zyPREXA) ODT tab 10 mg (10 mg Oral $Given 7/10/24 2251)   LORazepam (ATIVAN) tablet 1 mg (1 mg Oral $Given 7/10/24 2251)     Labs Ordered and Resulted from Time of ED Arrival to Time of ED Departure - No data to display  No orders to display          Critical care was not performed.     Medical Decision Making  The patient's presentation was of moderate complexity (a chronic illness mild to moderate exacerbation, progression, or side effect of treatment).    The patient's evaluation involved:  history and exam without other MDM data elements    The patient's management necessitated moderate risk (prescription drug management including medications given in the ED).    Assessment & Plan        I have reviewed the nursing notes. I have reviewed the findings, diagnosis, plan and need for follow up with the patient.    Discharge Medication List as of 7/11/2024 12:11 AM          Final diagnoses:   Borderline personality disorder (H)   Intellectual disability   Anxiety   Nonsuicidal self-injury (H)         [facility] EMERGENCY DEPARTMENT  7/10/2024     Robert Olea MD  07/12/24 1022

## 2024-07-11 NOTE — TELEPHONE ENCOUNTER
"RN received warm transfer call. When getting on the line with the patient, patient states \"I'm in the parking lot and I am going to pass out.\" RN instructed patient to remain on the phone and not to hang up. Patient states that a  brought her to [facility] and that she is sitting in the car. RN asked patient if the  can run inside to the ED and tell them she needs emergent help. Patient then states \"I see someone coming with a wheelchair.\" RN remained on phone with patient. Patient slurring speech but coherent.     Patient then tells RN that she is inside the ED. RN asked patient to hand her phone to registration. RN confirmed with staff that patient was being checked in at [facility] ED. RN ended call as patient is now receiving emergency care.     See ED note 7/11/2024.         "

## 2024-07-11 NOTE — DISCHARGE INSTRUCTIONS
Discharge to home continue with current outpatient medications and follow-up with your outpatient providers

## 2024-07-11 NOTE — ED PROVIDER NOTES
"    [facility] EMERGENCY DEPARTMENT ([facility])    7/11/24      ED PROVIDER NOTE   Robertway D    History     Chief Complaint   Patient presents with    Dizziness     Arrived by cab, said she spent too much time in the sun running away from home, \"I gotta get out of here.\" Reports nausea as well.    Abdominal Pain     Onset this afternoon; reports nausea but denies vomitting.    Suicidal     The history is provided by the patient and medical records.     Sadaf Ross is a 23 year old female with history of ADHD, bipolar 1 disorder, borderline personality disorder, personality and intellectual disability, depression, and SI with frequent ED visits who presents to the emergency Department via taxi.  She has been anxious and upset, having difficulty calming herself down.  Patient notes that she had gone on a trip to [place] with her family and is having a hard time adjusting going back to the group home.    A medically appropriate review of systems was performed with pertinent positives and negatives noted in the HPI, and all other systems negative.    Physical Exam   BP: 126/82  Pulse: 89  Temp: 97.9  F (36.6  C)  Resp: 18  Height: 160 cm (5' 3\")  SpO2: 99 %  Physical Exam  Constitutional:       General: She is not in acute distress.     Appearance: Normal appearance. She is not diaphoretic.   HENT:      Head: Atraumatic.      Mouth/Throat:      Mouth: Mucous membranes are moist.   Eyes:      General: No scleral icterus.     Conjunctiva/sclera: Conjunctivae normal.   Cardiovascular:      Rate and Rhythm: Normal rate.      Heart sounds: Normal heart sounds.   Pulmonary:      Effort: No respiratory distress.      Breath sounds: Normal breath sounds.   Abdominal:      General: Abdomen is flat.   Musculoskeletal:      Cervical back: Neck supple.   Skin:     General: Skin is warm.      Findings: No rash.   Neurological:      General: No focal deficit present.      Mental Status: She is alert and oriented to person, " place, and time.      Sensory: No sensory deficit.      Motor: No weakness.      Coordination: Coordination normal.   Psychiatric:         Mood and Affect: Mood is anxious. Affect is tearful.         Speech: Speech normal.         Behavior: Behavior is cooperative.         Thought Content: Thought content does not include homicidal or suicidal ideation.           ED Course, Procedures, & Data      Procedures  Initially patient had talked about feeling somewhat vertiginous and having some mild abdominal discomfort I offered to give her Antivert and we also talked about the possibility of taking Zyprexa patient is unwilling to do so at this time.       No results found for any visits on 07/11/24.  Medications - No data to display    Labs Ordered and Resulted from Time of ED Arrival to Time of ED Departure - No data to display  No orders to display          Critical care was not performed.     Medical Decision Making  The patient's presentation was of moderate complexity (a chronic illness mild to moderate exacerbation, progression, or side effect of treatment).    The patient's evaluation involved:  history and exam without other MDM data elements    The patient's management necessitated only low risk treatment.    Assessment & Plan        I have reviewed the nursing notes. I have reviewed the findings, diagnosis, plan and need for follow up with the patient.    Patient essentially calmed here in the emergency room did not require an assessment at this time by the DEC 's patient is requesting discharge back to the group home and has made arrangements for her own transportation once again she is reassuring that she is not wanting to try and harm herself or others and is feeling as though she can follow-up with her outpatient providers.    Final diagnoses:   Borderline personality disorder (H)   Intellectual disability   Dizziness       [facility]  [facility]  7/11/2024        Robert Olea MD  07/12/24 0822

## 2024-07-12 ENCOUNTER — HOSPITAL ENCOUNTER (EMERGENCY)
Facility: CLINIC | Age: 24
Discharge: HOME | End: 2024-07-12
Attending: EMERGENCY MEDICINE | Admitting: EMERGENCY MEDICINE
Payer: MEDICARE

## 2024-07-12 VITALS
TEMPERATURE: 98.5 F | SYSTOLIC BLOOD PRESSURE: 133 MMHG | RESPIRATION RATE: 18 BRPM | DIASTOLIC BLOOD PRESSURE: 91 MMHG | HEART RATE: 77 BPM | OXYGEN SATURATION: 99 %

## 2024-07-12 DIAGNOSIS — R11.10 VOMITING AND DIARRHEA: ICD-10-CM

## 2024-07-12 DIAGNOSIS — R19.7 VOMITING AND DIARRHEA: ICD-10-CM

## 2024-07-12 LAB
ALBUMIN SERPL BCG-MCNC: 4.8 G/DL (ref 3.5–5.2)
ALP SERPL-CCNC: 63 U/L (ref 40–150)
ALT SERPL W P-5'-P-CCNC: 22 U/L (ref 0–50)
ANION GAP SERPL CALCULATED.3IONS-SCNC: 13 MMOL/L (ref 7–15)
AST SERPL W P-5'-P-CCNC: 16 U/L (ref 0–45)
BASOPHILS # BLD AUTO: 0 10E3/UL (ref 0–0.2)
BASOPHILS NFR BLD AUTO: 1 %
BILIRUB SERPL-MCNC: 0.3 MG/DL
BUN SERPL-MCNC: 10.9 MG/DL (ref 6–20)
CALCIUM SERPL-MCNC: 9.8 MG/DL (ref 8.6–10)
CHLORIDE SERPL-SCNC: 105 MMOL/L (ref 98–107)
CREAT SERPL-MCNC: 0.85 MG/DL (ref 0.51–0.95)
DEPRECATED HCO3 PLAS-SCNC: 21 MMOL/L (ref 22–29)
EGFRCR SERPLBLD CKD-EPI 2021: >90 ML/MIN/1.73M2
EOSINOPHIL # BLD AUTO: 0.2 10E3/UL (ref 0–0.7)
EOSINOPHIL NFR BLD AUTO: 2 %
ERYTHROCYTE [DISTWIDTH] IN BLOOD BY AUTOMATED COUNT: 14 % (ref 10–15)
GLUCOSE SERPL-MCNC: 93 MG/DL (ref 70–99)
HCG SERPL QL: NEGATIVE
HCT VFR BLD AUTO: 40.2 % (ref 35–47)
HGB BLD-MCNC: 13.5 G/DL (ref 11.7–15.7)
IMM GRANULOCYTES # BLD: 0 10E3/UL
IMM GRANULOCYTES NFR BLD: 1 %
LIPASE SERPL-CCNC: 36 U/L (ref 13–60)
LYMPHOCYTES # BLD AUTO: 2.5 10E3/UL (ref 0.8–5.3)
LYMPHOCYTES NFR BLD AUTO: 31 %
MCH RBC QN AUTO: 27.2 PG (ref 26.5–33)
MCHC RBC AUTO-ENTMCNC: 33.6 G/DL (ref 31.5–36.5)
MCV RBC AUTO: 81 FL (ref 78–100)
MONOCYTES # BLD AUTO: 0.5 10E3/UL (ref 0–1.3)
MONOCYTES NFR BLD AUTO: 6 %
NEUTROPHILS # BLD AUTO: 5 10E3/UL (ref 1.6–8.3)
NEUTROPHILS NFR BLD AUTO: 59 %
NRBC # BLD AUTO: 0 10E3/UL
NRBC BLD AUTO-RTO: 0 /100
PLATELET # BLD AUTO: 240 10E3/UL (ref 150–450)
POTASSIUM SERPL-SCNC: 3.8 MMOL/L (ref 3.4–5.3)
PROT SERPL-MCNC: 7.8 G/DL (ref 6.4–8.3)
RBC # BLD AUTO: 4.96 10E6/UL (ref 3.8–5.2)
SODIUM SERPL-SCNC: 139 MMOL/L (ref 135–145)
WBC # BLD AUTO: 8.2 10E3/UL (ref 4–11)

## 2024-07-12 PROCEDURE — 36415 COLL VENOUS BLD VENIPUNCTURE: CPT | Performed by: EMERGENCY MEDICINE

## 2024-07-12 PROCEDURE — 96361 HYDRATE IV INFUSION ADD-ON: CPT | Performed by: EMERGENCY MEDICINE

## 2024-07-12 PROCEDURE — 84703 CHORIONIC GONADOTROPIN ASSAY: CPT | Performed by: EMERGENCY MEDICINE

## 2024-07-12 PROCEDURE — 80053 COMPREHEN METABOLIC PANEL: CPT | Performed by: EMERGENCY MEDICINE

## 2024-07-12 PROCEDURE — 96360 HYDRATION IV INFUSION INIT: CPT | Performed by: EMERGENCY MEDICINE

## 2024-07-12 PROCEDURE — 85025 COMPLETE CBC W/AUTO DIFF WBC: CPT | Performed by: EMERGENCY MEDICINE

## 2024-07-12 PROCEDURE — 258N000003 HC RX IP 258 OP 636: Performed by: EMERGENCY MEDICINE

## 2024-07-12 PROCEDURE — 99284 EMERGENCY DEPT VISIT MOD MDM: CPT | Performed by: EMERGENCY MEDICINE

## 2024-07-12 PROCEDURE — 250N000011 HC RX IP 250 OP 636: Performed by: EMERGENCY MEDICINE

## 2024-07-12 PROCEDURE — 83690 ASSAY OF LIPASE: CPT | Performed by: EMERGENCY MEDICINE

## 2024-07-12 PROCEDURE — 99283 EMERGENCY DEPT VISIT LOW MDM: CPT | Mod: 25 | Performed by: EMERGENCY MEDICINE

## 2024-07-12 RX ORDER — ONDANSETRON 2 MG/ML
4 INJECTION INTRAMUSCULAR; INTRAVENOUS ONCE
Status: COMPLETED | OUTPATIENT
Start: 2024-07-12 | End: 2024-07-12

## 2024-07-12 RX ORDER — SODIUM CHLORIDE 9 MG/ML
INJECTION, SOLUTION INTRAVENOUS CONTINUOUS
Status: DISCONTINUED | OUTPATIENT
Start: 2024-07-12 | End: 2024-07-12 | Stop reason: HOSPADM

## 2024-07-12 RX ORDER — ONDANSETRON 4 MG/1
4 TABLET, ORALLY DISINTEGRATING ORAL ONCE
Status: COMPLETED | OUTPATIENT
Start: 2024-07-12 | End: 2024-07-12

## 2024-07-12 RX ADMIN — ONDANSETRON 4 MG: 4 TABLET, ORALLY DISINTEGRATING ORAL at 17:08

## 2024-07-12 RX ADMIN — SODIUM CHLORIDE 1000 ML: 9 INJECTION, SOLUTION INTRAVENOUS at 17:08

## 2024-07-12 ASSESSMENT — COLUMBIA-SUICIDE SEVERITY RATING SCALE - C-SSRS
4. HAVE YOU HAD THESE THOUGHTS AND HAD SOME INTENTION OF ACTING ON THEM?: NO
3. HAVE YOU BEEN THINKING ABOUT HOW YOU MIGHT KILL YOURSELF?: NO
2. HAVE YOU ACTUALLY HAD ANY THOUGHTS OF KILLING YOURSELF IN THE PAST MONTH?: YES
6. HAVE YOU EVER DONE ANYTHING, STARTED TO DO ANYTHING, OR PREPARED TO DO ANYTHING TO END YOUR LIFE?: YES
5. HAVE YOU STARTED TO WORK OUT OR WORKED OUT THE DETAILS OF HOW TO KILL YOURSELF? DO YOU INTEND TO CARRY OUT THIS PLAN?: NO
1. IN THE PAST MONTH, HAVE YOU WISHED YOU WERE DEAD OR WISHED YOU COULD GO TO SLEEP AND NOT WAKE UP?: YES
BASED ON RESPONSES TO C-SSRS QS 1-6, WHAT IS THE PATIENT'S OVERALL RISK RATING FOR SUICIDE: MODERATE RISK

## 2024-07-12 ASSESSMENT — ACTIVITIES OF DAILY LIVING (ADL)
ADLS_ACUITY_SCORE: 39

## 2024-07-12 NOTE — ED PROVIDER NOTES
ED PROVIDER NOTE  [facility]  [facility]      History     Chief Complaint   Patient presents with    Abdominal Pain    Shortness of Breath     HPI  Sadaf Ross is a 23 year old female with history of bipolar disorder and frequent visits to the ER known well to the nursing personnel here.  Patient does have a care plan for her chronic abdominal pain complaints.  The patient states that in the last 2 days she has felt ill and states that today she has vomited 3 times in the last 24 hours has had at least 3 episodes of watery diarrhea.  The patient states that she has also suicidal but that she feels shaky.  She presents here to the ER for evaluation.    This part of the document was transcribed by Larisa Grant, Medical Scribe.      I have reviewed the Medications, Allergies, Past Medical and Surgical History, and Social History in the [facility] system.  Past Medical History:   Diagnosis Date    ADHD (attention deficit hyperactivity disorder)     Bipolar 1 disorder (H)     Borderline personality disorder (H)     Depressive disorder     Intellectual disability     Obesity     Syncope        Past Surgical History:   Procedure Laterality Date    APPENDECTOMY           Dose / Directions   acetaminophen 325 MG tablet  Commonly known as: TYLENOL      Dose: 650 mg  Take 650 mg by mouth every 6 hours as needed for mild pain  Refills: 0     ARIPiprazole lauroxil  MG/3.2ML intra-muscular  Commonly known as: ARISTADA      Dose: 882 mg  Inject 882 mg into the muscle every 28 days On the 22nd of each month  Refills: 0     bisacodyl 5 MG EC tablet  Commonly known as: DULCOLAX      Dose: 5 mg  Take 1 tablet (5 mg) by mouth daily as needed for constipation  Quantity: 30 tablet  Refills: 0     cloNIDine 0.1 MG tablet  Commonly known as: CATAPRES      Dose: 1 tablet  Take 1 tablet by mouth daily  Refills: 0     diclofenac 1 % topical gel  Commonly known as: VOLTAREN      Dose: 2 g  Apply 2 g topically 4  times daily  Quantity: 350 g  Refills: 0     dicyclomine 20 MG tablet  Commonly known as: BENTYL      Dose: 20 mg  Take 20 mg by mouth 2 times daily as needed (dyspepsia)  Refills: 0     divalproex sodium extended-release 250 MG 24 hr tablet  Commonly known as: DEPAKOTE ER      Dose: 500 mg  Take 500 mg by mouth daily  Refills: 0     docusate sodium 50 MG capsule  Commonly known as: COLACE      Dose: 100 mg  Take 100 mg by mouth 2 times daily  Refills: 0     famotidine 20 MG tablet  Commonly known as: PEPCID      Dose: 20 mg  Take 20 mg by mouth daily  Refills: 0     FLUoxetine 40 MG capsule  Commonly known as: PROzac      Dose: 80 mg  Take 80 mg by mouth daily  Refills: 0     hydrOXYzine HCl 50 MG tablet  Commonly known as: ATARAX      Dose: 50 mg  Take 50 mg by mouth 2 times daily as needed for anxiety  Refills: 0     levothyroxine 25 MCG tablet  Commonly known as: SYNTHROID/LEVOTHROID      Dose: 25 mcg  Take 1 tablet (25 mcg) by mouth daily for 30 days  Quantity: 30 tablet  Refills: 0     loperamide 2 MG tablet  Commonly known as: IMODIUM A-D      Take 2 tablets (4 mg) in the morning.  Take 1 tablet (2 mg) with every loose stool.  Do not exceed 8 tablets in a day.  Quantity: 30 tablet  Refills: 0     OLANZapine zydis 5 MG ODT  Commonly known as: zyPREXA      Dose: 5 mg  Take 1 tablet (5 mg) by mouth At Bedtime  Quantity: 30 tablet  Refills: 0     ondansetron 4 MG tablet  Commonly known as: Zofran      Dose: 4 mg  Take 1 tablet (4 mg) by mouth every 8 hours as needed  Quantity: 15 tablet  Refills: 0     pantoprazole 40 MG EC tablet  Commonly known as: PROTONIX      Dose: 40 mg  Take 40 mg by mouth daily  Refills: 0     phenazopyridine 100 MG tablet  Commonly known as: PYRIDIUM      Dose: 100 mg  Take 1 tablet (100 mg) by mouth 3 times daily as needed for urinary tract discomfort  Quantity: 6 tablet  Refills: 0     promethazine 12.5 MG tablet  Commonly known as: PHENERGAN      Dose: 12.5 mg  Take 12.5 mg by mouth  every 4 hours  Refills: 0     senna-docusate 8.6-50 MG tablet  Commonly known as: SENOKOT-S/PERICOLACE      Dose: 1 tablet  Take 1 tablet by mouth 2 times daily  Refills: 0     traZODone 50 MG tablet  Commonly known as: DESYREL      Dose: 100 mg  Take 100 mg by mouth At Bedtime  Refills: 0     Vitamin D3 25 mcg (1000 units) tablet  Commonly known as: CHOLECALCIFEROL      Dose: 1,000 Units  Take 1,000 Units by mouth daily  Refills: 0              Past medical history, past surgical history, medications, and allergies were reviewed with the patient. Additional pertinent items: None    Family History   Problem Relation Age of Onset    Diabetes Type 1 Father     Cancer Paternal Grandfather        Social History     Tobacco Use    Smoking status: Former     Current packs/day: 0.25     Average packs/day: 0.3 packs/day for 5.0 years (1.3 ttl pk-yrs)     Types: Cigarettes, Vaping Device    Smokeless tobacco: Former   Substance Use Topics    Alcohol use: No     Social history was reviewed with the patient. Additional pertinent items: None    Allergies   Allergen Reactions    Penicillins Rash and Unknown       Review of Systems  A medically appropriate review of systems was performed with pertinent positives and negatives noted in the HPI, and all other systems negative.    Physical Exam   BP: (!) 83/58  Pulse: 95  Temp: 98.5  F (36.9  C)  Resp: 14  SpO2: 96 %    BP (!) 133/91   Pulse 77   Temp 98.5  F (36.9  C) (Oral)   Resp 18   LMP  (LMP Unknown)   SpO2 99%     Physical Exam  Vitals and nursing note reviewed.   Constitutional:       Comments: Initially has ongoing nausea but ambulatory without difficulty   HENT:      Head: Atraumatic.   Eyes:      Extraocular Movements: Extraocular movements intact.      Pupils: Pupils are equal, round, and reactive to light.   Cardiovascular:      Rate and Rhythm: Regular rhythm.   Pulmonary:      Breath sounds: Normal breath sounds.   Abdominal:      Palpations: Abdomen is soft.       Tenderness: There is no abdominal tenderness.   Neurological:      General: No focal deficit present.      Mental Status: She is alert and oriented to person, place, and time.   Psychiatric:      Comments: Slightly bizarre but at baseline         ED Course     Orders Placed This Encounter   Procedures    Abdomen XR, 2 vw, flat and upright    Comprehensive metabolic panel    Lipase    HCG qualitative Blood    CBC with platelets and differential    Peripheral IV: Standard    CBC with platelets differential       Procedures           Results for orders placed or performed during the hospital encounter of 07/12/24 (from the past 24 hour(s))   CBC with platelets differential    Narrative    The following orders were created for panel order CBC with platelets differential.  Procedure                               Abnormality         Status                     ---------                               -----------         ------                     CBC with platelets and d...[[id number]]                      Final result                 Please view results for these tests on the individual orders.   Comprehensive metabolic panel   Result Value Ref Range    Sodium 139 135 - 145 mmol/L    Potassium 3.8 3.4 - 5.3 mmol/L    Carbon Dioxide (CO2) 21 (L) 22 - 29 mmol/L    Anion Gap 13 7 - 15 mmol/L    Urea Nitrogen 10.9 6.0 - 20.0 mg/dL    Creatinine 0.85 0.51 - 0.95 mg/dL    GFR Estimate >90 >60 mL/min/1.73m2    Calcium 9.8 8.6 - 10.0 mg/dL    Chloride 105 98 - 107 mmol/L    Glucose 93 70 - 99 mg/dL    Alkaline Phosphatase 63 40 - 150 U/L    AST 16 0 - 45 U/L    ALT 22 0 - 50 U/L    Protein Total 7.8 6.4 - 8.3 g/dL    Albumin 4.8 3.5 - 5.2 g/dL    Bilirubin Total 0.3 <=1.2 mg/dL   Lipase   Result Value Ref Range    Lipase 36 13 - 60 U/L   CBC with platelets and differential   Result Value Ref Range    WBC Count 8.2 4.0 - 11.0 10e3/uL    RBC Count 4.96 3.80 - 5.20 10e6/uL    Hemoglobin 13.5 11.7 - 15.7 g/dL    Hematocrit 40.2 35.0 -  47.0 %    MCV 81 78 - 100 fL    MCH 27.2 26.5 - 33.0 pg    MCHC 33.6 31.5 - 36.5 g/dL    RDW 14.0 10.0 - 15.0 %    Platelet Count 240 150 - 450 10e3/uL    % Neutrophils 59 %    % Lymphocytes 31 %    % Monocytes 6 %    % Eosinophils 2 %    % Basophils 1 %    % Immature Granulocytes 1 %    NRBCs per 100 WBC 0 <1 /100    Absolute Neutrophils 5.0 1.6 - 8.3 10e3/uL    Absolute Lymphocytes 2.5 0.8 - 5.3 10e3/uL    Absolute Monocytes 0.5 0.0 - 1.3 10e3/uL    Absolute Eosinophils 0.2 0.0 - 0.7 10e3/uL    Absolute Basophils 0.0 0.0 - 0.2 10e3/uL    Absolute Immature Granulocytes 0.0 <=0.4 10e3/uL    Absolute NRBCs 0.0 10e3/uL   HCG qualitative Blood   Result Value Ref Range    hCG Serum Qualitative Negative Negative     Medications   sodium chloride 0.9 % infusion ( Intravenous Not Given 7/12/24 1951)   sodium chloride 0.9% BOLUS 1,000 mL (0 mLs Intravenous Stopped 7/12/24 1932)   ondansetron (ZOFRAN ODT) ODT tab 4 mg (4 mg Oral $Given 7/12/24 1708)   ondansetron (ZOFRAN) injection 4 mg (4 mg Intravenous Not Given 7/12/24 1722)     Patient swelled up refusing her abdominal x-ray and pulling out her IV at this point she will be sent home.  He is ambulating without difficulty and vomiting has ceased.         Critical care was not performed.     Medical Decision Making  The patient's presentation was of moderate complexity (an acute illness with systemic symptoms).    The patient's evaluation involved:  ordering and/or review of 3+ test(s) in this encounter (see above)    The patient's management necessitated moderate risk (due to the patient's complicated history and the lack of her following up with recommended testing and evaluation).      Assessments & Plan (with Medical Decision Making)     I have reviewed the nursing notes.    I have reviewed the findings, diagnosis, plan and need for follow up with the patient.      Final diagnoses:   Vomiting and diarrhea     Home tonight to rest    Keep yourself hydrated    Please make  an appointment to follow up with Your Primary Care Provider in 3 days if not improving.      HEATHER CANCHOLA MD    7/12/2024   [facility] EMERGENCY DEPARTMENT          Heather Canchola MD  07/12/24 2015

## 2024-07-12 NOTE — ED TRIAGE NOTES
Pt sattes she is here for SOB and abdominal pain (9/10)  pt states she has thown up 3x prior to arrival.  Has been feeling shaky and SOB for the past couple of days.  Pt admits to active SI thoughts with no active plan.pt was seen yesterday for SI  Headache 10/10 pain.      Triage Assessment (Adult)       Row Name 07/12/24 2435          Triage Assessment    Airway WDL WDL        Respiratory WDL    Respiratory WDL X;all     Rhythm/Pattern, Respiratory shortness of breath     Expansion/Accessory Muscles/Retractions no use of accessory muscles        Skin Circulation/Temperature WDL    Skin Circulation/Temperature WDL WDL        Cardiac WDL    Cardiac WDL WDL        Peripheral/Neurovascular WDL    Peripheral Neurovascular WDL WDL        Cognitive/Neuro/Behavioral WDL    Cognitive/Neuro/Behavioral WDL WDL

## 2024-07-13 ENCOUNTER — HOSPITAL ENCOUNTER (EMERGENCY)
Facility: CLINIC | Age: 24
Discharge: HOME | End: 2024-07-13
Attending: EMERGENCY MEDICINE
Payer: MEDICARE

## 2024-07-13 VITALS
DIASTOLIC BLOOD PRESSURE: 86 MMHG | TEMPERATURE: 98.4 F | HEART RATE: 91 BPM | RESPIRATION RATE: 16 BRPM | OXYGEN SATURATION: 98 % | SYSTOLIC BLOOD PRESSURE: 139 MMHG

## 2024-07-13 VITALS
SYSTOLIC BLOOD PRESSURE: 127 MMHG | RESPIRATION RATE: 16 BRPM | DIASTOLIC BLOOD PRESSURE: 84 MMHG | TEMPERATURE: 98.3 F | HEART RATE: 69 BPM | OXYGEN SATURATION: 99 %

## 2024-07-13 DIAGNOSIS — F60.3 BORDERLINE PERSONALITY DISORDER (H): ICD-10-CM

## 2024-07-13 PROCEDURE — 99283 EMERGENCY DEPT VISIT LOW MDM: CPT | Mod: 27 | Performed by: EMERGENCY MEDICINE

## 2024-07-13 PROCEDURE — 250N000013 HC RX MED GY IP 250 OP 250 PS 637: Performed by: EMERGENCY MEDICINE

## 2024-07-13 PROCEDURE — 99284 EMERGENCY DEPT VISIT MOD MDM: CPT | Performed by: EMERGENCY MEDICINE

## 2024-07-13 PROCEDURE — 99283 EMERGENCY DEPT VISIT LOW MDM: CPT | Performed by: EMERGENCY MEDICINE

## 2024-07-13 RX ORDER — HYDROXYZINE HYDROCHLORIDE 50 MG/1
50 TABLET, FILM COATED ORAL ONCE
Status: COMPLETED | OUTPATIENT
Start: 2024-07-13 | End: 2024-07-13

## 2024-07-13 RX ORDER — OLANZAPINE 10 MG/1
10 TABLET, ORALLY DISINTEGRATING ORAL ONCE
Status: COMPLETED | OUTPATIENT
Start: 2024-07-13 | End: 2024-07-13

## 2024-07-13 RX ADMIN — OLANZAPINE 10 MG: 10 TABLET, ORALLY DISINTEGRATING ORAL at 04:19

## 2024-07-13 RX ADMIN — HYDROXYZINE HYDROCHLORIDE 50 MG: 50 TABLET, FILM COATED ORAL at 17:45

## 2024-07-13 ASSESSMENT — ACTIVITIES OF DAILY LIVING (ADL)
ADLS_ACUITY_SCORE: 39
ADLS_ACUITY_SCORE: 37
ADLS_ACUITY_SCORE: 39

## 2024-07-13 ASSESSMENT — COLUMBIA-SUICIDE SEVERITY RATING SCALE - C-SSRS
1. IN THE PAST MONTH, HAVE YOU WISHED YOU WERE DEAD OR WISHED YOU COULD GO TO SLEEP AND NOT WAKE UP?: YES
2. HAVE YOU ACTUALLY HAD ANY THOUGHTS OF KILLING YOURSELF IN THE PAST MONTH?: YES
5. HAVE YOU STARTED TO WORK OUT OR WORKED OUT THE DETAILS OF HOW TO KILL YOURSELF? DO YOU INTEND TO CARRY OUT THIS PLAN?: YES
2. HAVE YOU ACTUALLY HAD ANY THOUGHTS OF KILLING YOURSELF IN THE PAST MONTH?: YES
3. HAVE YOU BEEN THINKING ABOUT HOW YOU MIGHT KILL YOURSELF?: YES
6. HAVE YOU EVER DONE ANYTHING, STARTED TO DO ANYTHING, OR PREPARED TO DO ANYTHING TO END YOUR LIFE?: YES
5. HAVE YOU STARTED TO WORK OUT OR WORKED OUT THE DETAILS OF HOW TO KILL YOURSELF? DO YOU INTEND TO CARRY OUT THIS PLAN?: YES
4. HAVE YOU HAD THESE THOUGHTS AND HAD SOME INTENTION OF ACTING ON THEM?: NO
6. HAVE YOU EVER DONE ANYTHING, STARTED TO DO ANYTHING, OR PREPARED TO DO ANYTHING TO END YOUR LIFE?: YES
4. HAVE YOU HAD THESE THOUGHTS AND HAD SOME INTENTION OF ACTING ON THEM?: NO
1. IN THE PAST MONTH, HAVE YOU WISHED YOU WERE DEAD OR WISHED YOU COULD GO TO SLEEP AND NOT WAKE UP?: YES
3. HAVE YOU BEEN THINKING ABOUT HOW YOU MIGHT KILL YOURSELF?: YES

## 2024-07-13 NOTE — ED PROVIDER NOTES
ED Provider Note  [facility]      History     Chief Complaint   Patient presents with    Mental Health Problem    Suicidal     Pt reports to EMS that she wants to jump off the bridge        HPI  Sadaf Ross is a 23 year old female with PMH notable for intellectual disability, bipolar 1, borderline personality disorder, ADHD, very frequent ED presentations well-known to this emergency department, lives in group home  who presents to the ED shortly after being discharged earlier today.  When she arrived back to the group home she cut her index finger with a plastic object.  Home staff called 9 1 she was brought here for further evaluation.  In the ED patient states that this was an accidental injury.  She tells me that she is not actively suicidal, however, she does have suicidal ideations intermittently.  She denies any homicidal ideations.  She denies any hallucinations.  No other concerns or complaints.    Past Medical History  Past Medical History:   Diagnosis Date    ADHD (attention deficit hyperactivity disorder)     Bipolar 1 disorder (H)     Borderline personality disorder (H)     Depressive disorder     Intellectual disability     Obesity     Syncope      Past Surgical History:   Procedure Laterality Date    APPENDECTOMY       acetaminophen (TYLENOL) 325 MG tablet  ARIPiprazole lauroxil ER (ARISTADA) 882 MG/3.2ML intra-muscular  bisacodyl (DULCOLAX) 5 MG EC tablet  cloNIDine (CATAPRES) 0.1 MG tablet  diclofenac (VOLTAREN) 1 % topical gel  dicyclomine (BENTYL) 20 MG tablet  divalproex sodium extended-release (DEPAKOTE ER) 250 MG 24 hr tablet  docusate sodium (COLACE) 50 MG capsule  famotidine (PEPCID) 20 MG tablet  FLUoxetine (PROZAC) 40 MG capsule  hydrOXYzine (ATARAX) 50 MG tablet  levothyroxine (SYNTHROID/LEVOTHROID) 25 MCG tablet  loperamide (IMODIUM A-D) 2 MG tablet  OLANZapine zydis (ZYPREXA) 5 MG ODT  ondansetron (ZOFRAN) 4 MG tablet  pantoprazole (PROTONIX) 40 MG EC  tablet  phenazopyridine (PYRIDIUM) 100 MG tablet  promethazine (PHENERGAN) 12.5 MG tablet  senna-docusate (SENOKOT-S/PERICOLACE) 8.6-50 MG tablet  traZODone (DESYREL) 50 MG tablet  Vitamin D, Cholecalciferol, 25 MCG (1000 UT) TABS      Allergies   Allergen Reactions    Penicillins Rash and Unknown     Family History  Family History   Problem Relation Age of Onset    Diabetes Type 1 Father     Cancer Paternal Grandfather      Social History   Social History     Tobacco Use    Smoking status: Former     Current packs/day: 0.25     Average packs/day: 0.3 packs/day for 5.0 years (1.3 ttl pk-yrs)     Types: Cigarettes, Vaping Device    Smokeless tobacco: Former   Substance Use Topics    Alcohol use: No    Drug use: No      Past medical history, past surgical history, medications, allergies, family history, and social history were reviewed with the patient. No additional pertinent items.   A medically appropriate review of systems was performed with pertinent positives and negatives noted in the HPI, and all other systems negative.    Physical Exam      Physical Exam  Vitals and nursing note reviewed.   Constitutional:       General: She is not in acute distress.     Appearance: She is not ill-appearing, toxic-appearing or diaphoretic.      Comments: Patient sleeping, but easily awakens to voice.  She is in no acute distress.  She appears her stated age.  Reasonably well-groomed.   HENT:      Head: Normocephalic and atraumatic.      Nose: Nose normal.   Eyes:      Extraocular Movements: Extraocular movements intact.      Conjunctiva/sclera: Conjunctivae normal.      Pupils: Pupils are equal, round, and reactive to light.   Cardiovascular:      Rate and Rhythm: Normal rate.      Heart sounds: Normal heart sounds.   Pulmonary:      Effort: Pulmonary effort is normal. No respiratory distress.   Abdominal:      Palpations: Abdomen is soft.      Tenderness: There is no abdominal tenderness.   Musculoskeletal:         General:  No tenderness. Normal range of motion.      Cervical back: Normal range of motion and neck supple.   Skin:     General: Skin is warm.      Capillary Refill: Capillary refill takes less than 2 seconds.      Findings: No rash.      Comments: Abrasion on palmar aspect of left index finger without bleeding.   Neurological:      General: No focal deficit present.      Mental Status: She is alert and oriented to person, place, and time.      GCS: GCS eye subscore is 4. GCS verbal subscore is 5. GCS motor subscore is 6.   Psychiatric:         Mood and Affect: Mood normal.      Comments: Linear thought process. Poor judgement. No hallucinations. No SI or HI.           ED Course, Procedures, & Data      Procedures                No results found for any visits on 07/13/24.  Medications   OLANZapine zydis (zyPREXA) ODT tab 10 mg (10 mg Oral Not Given 7/13/24 1454)     Labs Ordered and Resulted from Time of ED Arrival to Time of ED Departure - No data to display  No orders to display          Critical care was not performed.     Medical Decision Making  The patient's presentation was of high complexity (an acute health issue posing potential threat to life or bodily function).    The patient's evaluation involved:  review of external note(s) from 1 sources (see separate area of note for details)  strong consideration of a test (see separate area of note for details) that was ultimately deferred  discussion of management or test interpretation with another health professional (see separate area of note for details)    The patient's management necessitated further care after sign-out to Dr. Pelaez (see their note for further management).    Assessment & Plan    23-year-old woman with complex mental health history presenting to the emergency department after self-inflicted wound to the finger.  Her wound does not need any suture repair.  There is no bleeding.  At this point I will request DEC assessment again and see if patient  is willing to undergo further evaluation.  She is calm and cooperative in the emergency department.    3:16 PM  Patient awaiting DEC assessment, reevaluation, and disposition.  She will be signed out to my partner at the change of shift.    I have reviewed the nursing notes. I have reviewed the findings, diagnosis, plan and need for follow up with the patient.    New Prescriptions    No medications on file       Final diagnoses:   None       Reena Fong MD  [facility] EMERGENCY DEPARTMENT  7/13/2024     Reena Fong MD  07/13/24 8421

## 2024-07-13 NOTE — DISCHARGE INSTRUCTIONS
Home tonight to rest    Keep yourself hydrated    Please make an appointment to follow up with Your Primary Care Provider in 3 days if not improving.

## 2024-07-13 NOTE — DISCHARGE INSTRUCTIONS
Follow up with established providers and supports as scheduled. Continue taking medications as prescribed. Utilize your [facility] mental [facility] crisis team as needed. They are available 24/7.     Try TIPP! It is a set of techniques that can help when you feel overwhelmed with an emotion. Practice these techniques so you know what they feel like and then use them as needed.     T: Temperature  The first step works by changing the temperature.  Cooler temperatures decrease your heart rate (which is usually faster when we are emotionally overwhelmed). You can either splash your face with cold water, take a cold (but not too cold) shower, or if the weather outside is chilly you can go outside for a walk. Another idea is to take an ice cube and hold it in your hand or rub your face with it.  Higher temperatures increase your heart rate (which is usually lower when you feel depressed, sad, or anxious). You can take a hot bath, nestle up in a blanket, go outside on a hot day, or drinking a warm tea.    I: Intense Exercise  When you have a built-up energy as a result of experiencing overwhelming emotions, it can be a really good idea to spend this energy by doing a cardio work-out. It doesn't have to be anything fancy - you don't need special equipment or expensive membership in a gym. Simply get on your feet and do one of the following: go for a run around the block, do jumping jacks in your room, go outside and walk fast. You can also try jumping rope, dancing or lifting weights. Do this for 10-15 minutes but don't overdo it. When you spend that conserved energy you will feel more tired and your overwhelming emotions will become more balanced.    P: Paced Breathing  In order to reduce the physical manifestation of the overwhelming emotions you feel (for e.g. increased heart rate, flushed face, dry mouth, sweating etc.) it helps to try to control your breathing so that its rate will eventually decrease. Try the following  technique: breathe in deeply through your nose (abdominal breathing) for four seconds and then breathe out through your mouth (for six seconds). Do this for 1-2 minutes.    P: Progressive Muscle Relaxation  In order to relax the tense muscles in our body while we are experiencing extreme emotions, you can try progressive muscle relaxation. You can do this from a seated position. Start with the top of your body - become aware of your muscles and the upper back and deliberately tighten them for five seconds. Then let go - you should feel the region loosening up. Keep doing this with your arms, your abdominal and back muscles, your bottom muscles, thighs and upper legs and calves. This is a great way for your body to let go of the excessive energy that has built up with the overwhelming emotions.

## 2024-07-13 NOTE — ED PROVIDER NOTES
Patient was signed out to me at change of shift by Dr. Fong, please see his note for full details.  Briefly, this 23-year-old is very well-known to the ED for multiple repeated visits, she has significant baseline psychiatric disease.  She is in a group home due to her baseline psychiatric disease.    At time of shift, we were pending BEC assessment.    Patient has been uncooperative with assessment.  She was given hydroxyzine here in the emergency department.  She wishes to discharge, though she does reports that she is suicidal.  She did not verbalize a plan.  This is her baseline behavior.  She is in a group home due to her chronic suicidal ideation.  We will transport her back to her group home at this time.  There is no need for acute hospitalization.     Deepa Pelaez MD  07/13/24 0243

## 2024-07-13 NOTE — ED NOTES
Writer informed by pt's 1:1 that pt was hitting self in head. When writer approached pt, pt had stopped hitting self and appeared sad and somber and tearful. When RN asked pt  what was going on pt would not respond. Writer offered multiple distraction techniques such as food, board games, coloring pages and listening to music. Pt was responsive to music and played you tube on personal phone. Pt appeared to be feeling better and accepted PO medication. MD aware and talked with pt. 1:1 remains present

## 2024-07-13 NOTE — CONSULTS
Order acknowledged and writer attempted to meet with patient but she refused to participate. Charge RN and psych associate attempted to help and pt continued to refuse assessment, swearing at staff to leave her the f- alone. When told that if she was refusing assessment, the plan would be discharge to group home, she yelled 'I'll come back tomorrow' and yells out statements of wanting to shoot herself.     Writer talked with group home staff Arlene [phone] who states that pt has been exhibiting the 'usual stuff' and has not observed any changes from baseline. She states that pt appeared happy all day yesterday while at group home. Does not identify any recent stressors/triggers. Pt does not have access to firearms or other means at group home; she has 24/7 staffing. She verbalizes understanding that pt will be discharged home. Declines need for additional services.    Pt has long hx frequent visits to the ED, this is her 8th visit in July. She endorses SI at her baseline. She has hx of disruptive and inappropriate bx in the ED and care plan advises that she return to group home promptly if there is no change in baseline. Pt sent with safety plan that she has participated with in previous ED visits.    Zuleyma Olivia, YOSHI  7/13/2024  8:28 AM

## 2024-07-13 NOTE — DISCHARGE INSTRUCTIONS
You have been seen in the emergency department today for your baseline psychiatric symptoms.  Please continue to take your medications and follow-up with your psychiatrist.

## 2024-07-13 NOTE — ED NOTES
Pt became anxious and agitated after waking up. Pt began biting hand and hitting head. RN able to verbally deescalate pt and pt agreeable to taking oral medication. Pt talked to  and is endorsing SI thoughts. Pt resting calmly on mattress in room, 1:1 present, will continue to monitor for behaviors.

## 2024-07-13 NOTE — ED NOTES
Bed: ED11  Expected date: 7/13/24  Expected time: 11:41 AM  Means of arrival:   Comments:  Fadi 726 23F SI

## 2024-07-13 NOTE — ED PROVIDER NOTES
History     Chief Complaint   Patient presents with    Suicidal     Pt states feeling suicidal with plan to bite self. Pt showed RN where she had bit self. 1:1 put into place for pt safety. Pt states no recent triggers     HPI  Sadaf Ross is a 23 year old female with PMH notable for intellectual disability, bipolar 1, borderline personality disorder, ADHD, very frequent ED presentations well-known to this emergency department, lives in group home  who presents to the ED with suicidal ideation. Patient reports biting herself to self-injure. She points to superficial bite bianca on right hand. She notes difficulty adjusting back to her group home since getting back from a vacation, denies other stressors.     Past Medical History  Past Medical History:   Diagnosis Date    ADHD (attention deficit hyperactivity disorder)     Bipolar 1 disorder (H)     Borderline personality disorder (H)     Depressive disorder     Intellectual disability     Obesity     Syncope      Past Surgical History:   Procedure Laterality Date    APPENDECTOMY       acetaminophen (TYLENOL) 325 MG tablet  ARIPiprazole lauroxil ER (ARISTADA) 882 MG/3.2ML intra-muscular  bisacodyl (DULCOLAX) 5 MG EC tablet  cloNIDine (CATAPRES) 0.1 MG tablet  diclofenac (VOLTAREN) 1 % topical gel  dicyclomine (BENTYL) 20 MG tablet  divalproex sodium extended-release (DEPAKOTE ER) 250 MG 24 hr tablet  docusate sodium (COLACE) 50 MG capsule  famotidine (PEPCID) 20 MG tablet  FLUoxetine (PROZAC) 40 MG capsule  hydrOXYzine (ATARAX) 50 MG tablet  levothyroxine (SYNTHROID/LEVOTHROID) 25 MCG tablet  loperamide (IMODIUM A-D) 2 MG tablet  OLANZapine zydis (ZYPREXA) 5 MG ODT  ondansetron (ZOFRAN) 4 MG tablet  pantoprazole (PROTONIX) 40 MG EC tablet  phenazopyridine (PYRIDIUM) 100 MG tablet  promethazine (PHENERGAN) 12.5 MG tablet  senna-docusate (SENOKOT-S/PERICOLACE) 8.6-50 MG tablet  traZODone (DESYREL) 50 MG tablet  Vitamin D, Cholecalciferol, 25 MCG (1000 UT)  TABS      Allergies   Allergen Reactions    Penicillins Rash and Unknown     Family History  Family History   Problem Relation Age of Onset    Diabetes Type 1 Father     Cancer Paternal Grandfather      Social History   Social History     Tobacco Use    Smoking status: Former     Current packs/day: 0.25     Average packs/day: 0.3 packs/day for 5.0 years (1.3 ttl pk-yrs)     Types: Cigarettes, Vaping Device    Smokeless tobacco: Former   Substance Use Topics    Alcohol use: No    Drug use: No         Review of Systems  A complete review of systems was performed with pertinent positives and negatives noted in the HPI, and all other systems negative.     Physical Exam   BP: 139/86  Pulse: 91  Temp: 98.4  F (36.9  C)  Resp: 16  SpO2: 98 %    Physical Exam  General: No acute distress. Appears stated age.   HENT: MMM, no oropharyngeal lesions  Eyes: PERRL, normal sclerae   Cardio: Regular rate, extremities well perfused  Resp: Normal work of breathing, normal respiratory rate  Neuro: alert and fully oriented. CN II-XII grossly intact. Grossly normal strength and sensation in all extremities.   MSK: no deformities.   Integumentary/Skin: no rash visualized, normal color  Psych: flat affect, erratic behavior. endorses SI. no HI. No apparent hallucinations.     ED Course      Procedures              Labs Ordered and Resulted from Time of ED Arrival to Time of ED Departure - No data to display  No orders to display              Medical Decision Making  The patient's presentation was of high complexity (a chronic illness severe exacerbation, progression, or side effect of treatment).    The patient's evaluation involved:  review of external note(s) from 1 sources (care coordination note)  discussion of management or test interpretation with another health professional (DEC)    The patient's management necessitated moderate risk (prescription drug management including medications given in the ED).      Assessments & Plan     Patient presenting with suicidal ideation. Vitals in the ED unremarkable. Nursing notes reviewed. Exam with superficial bite bianca, did not break skin. Care coordination note for patient's ED care plan reviewed. Recent ED visit notes reviewed. Patient has not been seen by DEC in over a month, has had increased visits for SI the past few days. DEC assessment requested. Olanzapine given.     Patient signed out to oncoming ED provider with plan for f/u of DEC recommendations, likely discharge back to group home.     Final diagnoses:   Borderline personality disorder (H)     Discharge Medication List as of 7/13/2024  8:36 AM          --  Kunal Manriquez MD   Emergency Medicine   [facility] EMERGENCY DEPARTMENT  7/13/2024     Kunal Manriquez MD  07/13/24 0835

## 2024-07-13 NOTE — ED NOTES
Writer was informed that patient was hitting herself. Patient was seen crying and verbalizing she wants to kill herself. Writer offered medications, nutrition, and encouraged activities. Patient states she was sad coming back from her vacation. Crying episode lasted for approximately 5 minutes. Patient declined nutrition and activities, but states she enjoys listening to music. Writer encouraged rest while listening to music on personal phone. Provider was notified. Patient was given PO medication.

## 2024-07-14 ENCOUNTER — HOSPITAL ENCOUNTER (EMERGENCY)
Facility: CLINIC | Age: 24
Discharge: HOME | End: 2024-07-14
Attending: EMERGENCY MEDICINE | Admitting: EMERGENCY MEDICINE
Payer: MEDICARE

## 2024-07-14 ENCOUNTER — HOSPITAL ENCOUNTER (EMERGENCY)
Facility: CLINIC | Age: 24
Discharge: HOME | End: 2024-07-14
Attending: EMERGENCY MEDICINE
Payer: MEDICARE

## 2024-07-14 ENCOUNTER — TELEPHONE (OUTPATIENT)
Dept: BEHAVIORAL HEALTH | Facility: CLINIC | Age: 24
End: 2024-07-14
Payer: MEDICARE

## 2024-07-14 DIAGNOSIS — R46.89 MANIPULATIVE BEHAVIOR: ICD-10-CM

## 2024-07-14 DIAGNOSIS — R11.2 NAUSEA AND VOMITING, UNSPECIFIED VOMITING TYPE: ICD-10-CM

## 2024-07-14 PROCEDURE — 99285 EMERGENCY DEPT VISIT HI MDM: CPT | Performed by: EMERGENCY MEDICINE

## 2024-07-14 PROCEDURE — 99283 EMERGENCY DEPT VISIT LOW MDM: CPT | Performed by: EMERGENCY MEDICINE

## 2024-07-14 PROCEDURE — 99283 EMERGENCY DEPT VISIT LOW MDM: CPT | Mod: 27 | Performed by: EMERGENCY MEDICINE

## 2024-07-14 ASSESSMENT — ACTIVITIES OF DAILY LIVING (ADL)
ADLS_ACUITY_SCORE: 39
ADLS_ACUITY_SCORE: 39

## 2024-07-14 ASSESSMENT — COLUMBIA-SUICIDE SEVERITY RATING SCALE - C-SSRS
2. HAVE YOU ACTUALLY HAD ANY THOUGHTS OF KILLING YOURSELF IN THE PAST MONTH?: YES
IS THE PATIENT NOT ABLE TO COMPLETE C-SSRS: REFUSES TO ANSWER
1. IN THE PAST MONTH, HAVE YOU WISHED YOU WERE DEAD OR WISHED YOU COULD GO TO SLEEP AND NOT WAKE UP?: YES
6. HAVE YOU EVER DONE ANYTHING, STARTED TO DO ANYTHING, OR PREPARED TO DO ANYTHING TO END YOUR LIFE?: YES

## 2024-07-14 NOTE — ED NOTES
"Patient refusing triage assessment, vital signs, attempting to rip out her IV that was placed by EMS. Pt stating \"I'm going to fucking kill all of you\". \"You all are evil\" and \"bye bitch\".   "

## 2024-07-14 NOTE — ED NOTES
Continue to decline assessment questions and vitals. Pt is upset that she was placed in room 14. MD is made aware of.

## 2024-07-14 NOTE — ED PROVIDER NOTES
ED PROVIDER NOTE  [facility]  [place] [place] [facility] [place]     Chief Complaint   Patient presents with    Suicidal     HPI  Sadaf Ross is a 23 year old female required visitor to the ER with a ED care plan in place who has a history of bipolar disorder depressive disorder intellectual disability and borderline personality disorder.  Patient has been seen in the ER several times over the last few days and mary Godfrey presents to the ER after being seen earlier today for suicidal thoughts.  Patient admits to no plan states she just wants to wander around the ER without being confined to a room.  Patient states she does not want any behavioral assessment and does not want any medication.  Patient states she does not take her own medication although she has at at home.    I have reviewed the Medications, Allergies, Past Medical and Surgical History, and Social History in the [facility] system.    Past Medical History:   Diagnosis Date    ADHD (attention deficit hyperactivity disorder)     Bipolar 1 disorder (H)     Borderline personality disorder (H)     Depressive disorder     Intellectual disability     Obesity     Syncope        Past Surgical History:   Procedure Laterality Date    APPENDECTOMY           Dose / Directions   acetaminophen 325 MG tablet  Commonly known as: TYLENOL      Dose: 650 mg  Take 650 mg by mouth every 6 hours as needed for mild pain  Refills: 0     ARIPiprazole lauroxil  MG/3.2ML intra-muscular  Commonly known as: ARISTADA      Dose: 882 mg  Inject 882 mg into the muscle every 28 days On the 22nd of each month  Refills: 0     bisacodyl 5 MG EC tablet  Commonly known as: DULCOLAX      Dose: 5 mg  Take 1 tablet (5 mg) by mouth daily as needed for constipation  Quantity: 30 tablet  Refills: 0     cloNIDine 0.1 MG tablet  Commonly known as: CATAPRES      Dose: 1 tablet  Take 1 tablet by mouth daily  Refills: 0     diclofenac 1 % topical gel  Commonly known as: VOLTAREN       Dose: 2 g  Apply 2 g topically 4 times daily  Quantity: 350 g  Refills: 0     dicyclomine 20 MG tablet  Commonly known as: BENTYL      Dose: 20 mg  Take 20 mg by mouth 2 times daily as needed (dyspepsia)  Refills: 0     divalproex sodium extended-release 250 MG 24 hr tablet  Commonly known as: DEPAKOTE ER      Dose: 500 mg  Take 500 mg by mouth daily  Refills: 0     docusate sodium 50 MG capsule  Commonly known as: COLACE      Dose: 100 mg  Take 100 mg by mouth 2 times daily  Refills: 0     famotidine 20 MG tablet  Commonly known as: PEPCID      Dose: 20 mg  Take 20 mg by mouth daily  Refills: 0     FLUoxetine 40 MG capsule  Commonly known as: PROzac      Dose: 80 mg  Take 80 mg by mouth daily  Refills: 0     hydrOXYzine HCl 50 MG tablet  Commonly known as: ATARAX      Dose: 50 mg  Take 50 mg by mouth 2 times daily as needed for anxiety  Refills: 0     levothyroxine 25 MCG tablet  Commonly known as: SYNTHROID/LEVOTHROID      Dose: 25 mcg  Take 1 tablet (25 mcg) by mouth daily for 30 days  Quantity: 30 tablet  Refills: 0     loperamide 2 MG tablet  Commonly known as: IMODIUM A-D      Take 2 tablets (4 mg) in the morning.  Take 1 tablet (2 mg) with every loose stool.  Do not exceed 8 tablets in a day.  Quantity: 30 tablet  Refills: 0     OLANZapine zydis 5 MG ODT  Commonly known as: zyPREXA      Dose: 5 mg  Take 1 tablet (5 mg) by mouth At Bedtime  Quantity: 30 tablet  Refills: 0     ondansetron 4 MG tablet  Commonly known as: Zofran      Dose: 4 mg  Take 1 tablet (4 mg) by mouth every 8 hours as needed  Quantity: 15 tablet  Refills: 0     pantoprazole 40 MG EC tablet  Commonly known as: PROTONIX      Dose: 40 mg  Take 40 mg by mouth daily  Refills: 0     phenazopyridine 100 MG tablet  Commonly known as: PYRIDIUM      Dose: 100 mg  Take 1 tablet (100 mg) by mouth 3 times daily as needed for urinary tract discomfort  Quantity: 6 tablet  Refills: 0     promethazine 12.5 MG tablet  Commonly known as: PHENERGAN      Dose:  12.5 mg  Take 12.5 mg by mouth every 4 hours  Refills: 0     senna-docusate 8.6-50 MG tablet  Commonly known as: SENOKOT-S/PERICOLACE      Dose: 1 tablet  Take 1 tablet by mouth 2 times daily  Refills: 0     traZODone 50 MG tablet  Commonly known as: DESYREL      Dose: 100 mg  Take 100 mg by mouth At Bedtime  Refills: 0     Vitamin D3 25 mcg (1000 units) tablet  Commonly known as: CHOLECALCIFEROL      Dose: 1,000 Units  Take 1,000 Units by mouth daily  Refills: 0         Past medical history, past surgical history, medications, and allergies were reviewed with the patient. Additional pertinent items: None    Family History   Problem Relation Age of Onset    Diabetes Type 1 Father     Cancer Paternal Grandfather        Social History     Tobacco Use    Smoking status: Former     Current packs/day: 0.25     Average packs/day: 0.3 packs/day for 5.0 years (1.3 ttl pk-yrs)     Types: Cigarettes, Vaping Device    Smokeless tobacco: Former   Substance Use Topics    Alcohol use: No     Social history was reviewed with the patient. Additional pertinent items: None    Allergies   Allergen Reactions    Penicillins Rash and Unknown       Review of Systems  A medically appropriate review of systems was performed with pertinent positives and negatives noted in the HPI, and all other systems negative.    Physical Exam          Physical Exam  Vitals and nursing note reviewed.   Constitutional:       Comments: Awake ambulatory requesting to walk around the ER to socialize and does not want any behavioral evaluation or medication   HENT:      Head: Atraumatic.   Eyes:      Extraocular Movements: Extraocular movements intact.      Pupils: Pupils are equal, round, and reactive to light.   Pulmonary:      Effort: No respiratory distress.   Musculoskeletal:         General: No deformity.   Neurological:      Mental Status: She is alert.   Psychiatric:      Comments: Labile         ED Course        Procedures         Critical care was not  performed.     Medical Decision Making  The patient's presentation was of moderate complexity (a chronic illness mild to moderate exacerbation, progression, or side effect of treatment).    The patient's evaluation involved:  history and exam without other MDM data elements    The patient's management necessitated only low risk treatment.      Assessments & Plan (with Medical Decision Making)     I have reviewed the nursing notes.    Medication was offered, behavioral assessment was offered, but patient did not want any of this and at this time will be discharged.      Final diagnoses:   Manipulative behavior     Take your medications as prescribed    Please make an appointment to follow up with Your Primary Care Provider  as needed.      HEATHER CANCHOLA MD    7/14/2024   [facility] EMERGENCY DEPARTMENT       Heather Canchola MD  07/14/24 6148

## 2024-07-14 NOTE — ED NOTES
Pt reporting some suicidal ideation but declining to move to mental health room. Moved to . Pt agitated because moved from medical room. Trying to pull her PIV and refusing vitals. PIV removed by another RN.

## 2024-07-14 NOTE — ED NOTES
Bed: ED14  Expected date: 7/14/24  Expected time: 12:14 PM  Means of arrival:   Comments:   23F N/V

## 2024-07-14 NOTE — ED TRIAGE NOTES
"Pt BIBA with complaints of suicidal thoughts, no plan. Declining assessment. When asking further questions pt replies, \"Don't ask, you don't care\". Encouraged the pt to share their feelings and provide additional information, pt declined.       "

## 2024-07-14 NOTE — ED PROVIDER NOTES
"ED Provider Note  [facility]      History     Chief Complaint   Patient presents with    Nausea & Vomiting     Pt called EMS bc she woke up and vomited 6x today. Took zofran at home with no relief. Rec'd IV zofran and NS en route per EMS.     HPI  Sadaf Ross is a 23 year old female who presents to us with chief complaint nausea and vomiting.  She took her home Zofran.  She was given Zofran IV by EMS.  When I initially talked to her patient states \"dont fucking bother me goddamnit.\"  After that she refused to express if she had any other complaints.    Past Medical History  Past Medical History:   Diagnosis Date    ADHD (attention deficit hyperactivity disorder)     Bipolar 1 disorder (H)     Borderline personality disorder (H)     Depressive disorder     Intellectual disability     Obesity     Syncope      Past Surgical History:   Procedure Laterality Date    APPENDECTOMY       acetaminophen (TYLENOL) 325 MG tablet  ARIPiprazole lauroxil ER (ARISTADA) 882 MG/3.2ML intra-muscular  bisacodyl (DULCOLAX) 5 MG EC tablet  cloNIDine (CATAPRES) 0.1 MG tablet  diclofenac (VOLTAREN) 1 % topical gel  dicyclomine (BENTYL) 20 MG tablet  divalproex sodium extended-release (DEPAKOTE ER) 250 MG 24 hr tablet  docusate sodium (COLACE) 50 MG capsule  famotidine (PEPCID) 20 MG tablet  FLUoxetine (PROZAC) 40 MG capsule  hydrOXYzine (ATARAX) 50 MG tablet  levothyroxine (SYNTHROID/LEVOTHROID) 25 MCG tablet  loperamide (IMODIUM A-D) 2 MG tablet  OLANZapine zydis (ZYPREXA) 5 MG ODT  ondansetron (ZOFRAN) 4 MG tablet  pantoprazole (PROTONIX) 40 MG EC tablet  phenazopyridine (PYRIDIUM) 100 MG tablet  promethazine (PHENERGAN) 12.5 MG tablet  senna-docusate (SENOKOT-S/PERICOLACE) 8.6-50 MG tablet  traZODone (DESYREL) 50 MG tablet  Vitamin D, Cholecalciferol, 25 MCG (1000 UT) TABS      Allergies   Allergen Reactions    Penicillins Rash and Unknown     Family History  Family History   Problem Relation Age of " Onset    Diabetes Type 1 Father     Cancer Paternal Grandfather      Social History   Social History     Tobacco Use    Smoking status: Former     Current packs/day: 0.25     Average packs/day: 0.3 packs/day for 5.0 years (1.3 ttl pk-yrs)     Types: Cigarettes, Vaping Device    Smokeless tobacco: Former   Substance Use Topics    Alcohol use: No    Drug use: No      A medically appropriate review of systems was performed with pertinent positives and negatives noted in the HPI, and all other systems negative.    Physical Exam   BP:  (pt refused)  Pulse:  (pt refused)  Temp:  (pt refused)  Resp:  (pt refused)  SpO2:  (pt refused)  Physical Exam  Vitals and nursing note reviewed.   Constitutional:       General: She is not in acute distress.     Appearance: She is well-developed. She is not ill-appearing.   HENT:      Head: Normocephalic and atraumatic.      Right Ear: External ear normal.      Left Ear: External ear normal.      Nose: Nose normal.   Eyes:      Extraocular Movements: Extraocular movements intact.      Conjunctiva/sclera: Conjunctivae normal.   Pulmonary:      Effort: Pulmonary effort is normal. No respiratory distress.   Abdominal:      General: There is no distension.   Musculoskeletal:         General: No swelling or deformity.      Cervical back: Normal range of motion and neck supple.   Skin:     General: Skin is warm and dry.   Neurological:      Mental Status: Mental status is at baseline.      Comments: Alert, oriented   Psychiatric:         Mood and Affect: Mood normal.         Behavior: Behavior normal.           ED Course, Procedures, & Data      Procedures            No results found for any visits on 07/14/24.  Medications - No data to display  Labs Ordered and Resulted from Time of ED Arrival to Time of ED Departure - No data to display  No orders to display   2023 Emergency Medicine Coding Guide from [email or web address]  on 7/14/2024  ** All calculations should be rechecked by clinician prior to  use **    RESULT SUMMARY:  3 Estimated Level of Service  Problems: Moderate (4)    Risk: Low (3)    Data: Minimal (2)    NARRATIVE MDM:  This patient's problem complexity is Moderate as patient: has >1 chronic illnesses that appear to be at their baseline.  This patient's risk is Low due to: overall presentation requiring evaluation for a potentially Low-risk process.  This patient's data complexity is Minimal due to:   -external notes reviewed        INPUTS:  Number and Complexity --> 4 = 4: ?2 stable, chronic illnesses (d)  Risk level --> 2 = Low  Tests ordered --> 0 = 0  Tests results reviewed (excluding labs) --> 0 = 0  Prior external notes reviewed --> 1 = 1  Assessment requiring and independent historian --> 0 = No  Independent interpretation of tests --> 0 = No  Discussed management/test interpretation w/external professional --> 0 = No       Assessment & Plan    23-year-old female with a history of chronic mental health issues as well as nausea and vomiting presents to us initially with nausea today.  EMS reported that she was received IV Zofran and that helped with the patient's symptoms.  Patient did not want to discuss medical care or any further complaints and so was discharged.    I have reviewed the nursing notes. I have reviewed the findings, diagnosis, plan and need for follow up with the patient.    Discharge Medication List as of 7/14/2024 12:26 PM          Final diagnoses:   Nausea and vomiting, unspecified vomiting type       Richie Sinha  [facility] EMERGENCY DEPARTMENT  7/14/2024     Richie Sinha,   07/14/24 1329

## 2024-07-14 NOTE — TELEPHONE ENCOUNTER
This writer spoke with pt who stated she is still not doing well, writer suggested to return to the ED.

## 2024-07-14 NOTE — ED NOTES
Pt upset and fixated that staff were trying to put pt into room 14. Writer tried to explain the reasoning to pt. Pt brought her escalation down but then got worked up about how she came in for nausea and didn't like that staff tried to put her into room 14. Pt requested to be discharged. AVS given.

## 2024-07-15 ENCOUNTER — HOSPITAL ENCOUNTER (EMERGENCY)
Facility: CLINIC | Age: 24
Discharge: HOME | End: 2024-07-15
Attending: EMERGENCY MEDICINE | Admitting: EMERGENCY MEDICINE
Payer: MEDICARE

## 2024-07-15 VITALS
OXYGEN SATURATION: 98 % | SYSTOLIC BLOOD PRESSURE: 126 MMHG | HEART RATE: 54 BPM | RESPIRATION RATE: 18 BRPM | BODY MASS INDEX: 43.41 KG/M2 | TEMPERATURE: 98 F | DIASTOLIC BLOOD PRESSURE: 78 MMHG | WEIGHT: 245 LBS | HEIGHT: 63 IN

## 2024-07-15 DIAGNOSIS — R11.2 NAUSEA AND VOMITING, UNSPECIFIED VOMITING TYPE: ICD-10-CM

## 2024-07-15 LAB
ALBUMIN SERPL BCG-MCNC: 4.6 G/DL (ref 3.5–5.2)
ALBUMIN UR-MCNC: 30 MG/DL
ALP SERPL-CCNC: 62 U/L (ref 40–150)
ALT SERPL W P-5'-P-CCNC: 36 U/L (ref 0–50)
AMPHETAMINES UR QL SCN: NORMAL
ANION GAP SERPL CALCULATED.3IONS-SCNC: 12 MMOL/L (ref 7–15)
APPEARANCE UR: CLEAR
AST SERPL W P-5'-P-CCNC: 24 U/L (ref 0–45)
ATRIAL RATE - MUSE: 82 BPM
BACTERIA #/AREA URNS HPF: ABNORMAL /HPF
BARBITURATES UR QL SCN: NORMAL
BASOPHILS # BLD AUTO: 0 10E3/UL (ref 0–0.2)
BASOPHILS NFR BLD AUTO: 1 %
BENZODIAZ UR QL SCN: NORMAL
BILIRUB SERPL-MCNC: 0.3 MG/DL
BILIRUB UR QL STRIP: NEGATIVE
BUN SERPL-MCNC: 10.8 MG/DL (ref 6–20)
BZE UR QL SCN: NORMAL
CALCIUM SERPL-MCNC: 9.5 MG/DL (ref 8.6–10)
CANNABINOIDS UR QL SCN: NORMAL
CHLORIDE SERPL-SCNC: 107 MMOL/L (ref 98–107)
COLOR UR AUTO: YELLOW
CREAT SERPL-MCNC: 0.83 MG/DL (ref 0.51–0.95)
DIASTOLIC BLOOD PRESSURE - MUSE: NORMAL MMHG
EGFRCR SERPLBLD CKD-EPI 2021: >90 ML/MIN/1.73M2
EOSINOPHIL # BLD AUTO: 0.2 10E3/UL (ref 0–0.7)
EOSINOPHIL NFR BLD AUTO: 3 %
ERYTHROCYTE [DISTWIDTH] IN BLOOD BY AUTOMATED COUNT: 14 % (ref 10–15)
FENTANYL UR QL: NORMAL
GLUCOSE SERPL-MCNC: 103 MG/DL (ref 70–99)
GLUCOSE UR STRIP-MCNC: NEGATIVE MG/DL
HCG UR QL: NEGATIVE
HCO3 SERPL-SCNC: 22 MMOL/L (ref 22–29)
HCT VFR BLD AUTO: 37.7 % (ref 35–47)
HGB BLD-MCNC: 12.7 G/DL (ref 11.7–15.7)
HGB UR QL STRIP: NEGATIVE
IMM GRANULOCYTES # BLD: 0 10E3/UL
IMM GRANULOCYTES NFR BLD: 1 %
INTERPRETATION ECG - MUSE: NORMAL
KETONES UR STRIP-MCNC: NEGATIVE MG/DL
LEUKOCYTE ESTERASE UR QL STRIP: NEGATIVE
LIPASE SERPL-CCNC: 35 U/L (ref 13–60)
LYMPHOCYTES # BLD AUTO: 1.8 10E3/UL (ref 0.8–5.3)
LYMPHOCYTES NFR BLD AUTO: 30 %
MCH RBC QN AUTO: 27.2 PG (ref 26.5–33)
MCHC RBC AUTO-ENTMCNC: 33.7 G/DL (ref 31.5–36.5)
MCV RBC AUTO: 81 FL (ref 78–100)
MONOCYTES # BLD AUTO: 0.6 10E3/UL (ref 0–1.3)
MONOCYTES NFR BLD AUTO: 10 %
MUCOUS THREADS #/AREA URNS LPF: PRESENT /LPF
NEUTROPHILS # BLD AUTO: 3.4 10E3/UL (ref 1.6–8.3)
NEUTROPHILS NFR BLD AUTO: 55 %
NITRATE UR QL: NEGATIVE
NRBC # BLD AUTO: 0 10E3/UL
NRBC BLD AUTO-RTO: 0 /100
OPIATES UR QL SCN: NORMAL
P AXIS - MUSE: 41 DEGREES
PCP QUAL URINE (ROCHE): NORMAL
PH UR STRIP: 6 [PH] (ref 5–7)
PLATELET # BLD AUTO: 217 10E3/UL (ref 150–450)
POTASSIUM SERPL-SCNC: 4.4 MMOL/L (ref 3.4–5.3)
PR INTERVAL - MUSE: 174 MS
PROT SERPL-MCNC: 7.7 G/DL (ref 6.4–8.3)
QRS DURATION - MUSE: 88 MS
QT - MUSE: 376 MS
QTC - MUSE: 439 MS
R AXIS - MUSE: 23 DEGREES
RBC # BLD AUTO: 4.67 10E6/UL (ref 3.8–5.2)
RBC URINE: 1 /HPF
SARS-COV-2 RNA RESP QL NAA+PROBE: NEGATIVE
SODIUM SERPL-SCNC: 141 MMOL/L (ref 135–145)
SP GR UR STRIP: 1.03 (ref 1–1.03)
SQUAMOUS EPITHELIAL: 4 /HPF
SYSTOLIC BLOOD PRESSURE - MUSE: NORMAL MMHG
T AXIS - MUSE: 10 DEGREES
UROBILINOGEN UR STRIP-MCNC: NORMAL MG/DL
VENTRICULAR RATE- MUSE: 82 BPM
WBC # BLD AUTO: 6 10E3/UL (ref 4–11)
WBC URINE: 1 /HPF

## 2024-07-15 PROCEDURE — 80307 DRUG TEST PRSMV CHEM ANLYZR: CPT | Mod: GZ | Performed by: EMERGENCY MEDICINE

## 2024-07-15 PROCEDURE — 82040 ASSAY OF SERUM ALBUMIN: CPT | Performed by: EMERGENCY MEDICINE

## 2024-07-15 PROCEDURE — 250N000013 HC RX MED GY IP 250 OP 250 PS 637: Performed by: EMERGENCY MEDICINE

## 2024-07-15 PROCEDURE — 81025 URINE PREGNANCY TEST: CPT | Performed by: EMERGENCY MEDICINE

## 2024-07-15 PROCEDURE — 85004 AUTOMATED DIFF WBC COUNT: CPT | Performed by: EMERGENCY MEDICINE

## 2024-07-15 PROCEDURE — 36415 COLL VENOUS BLD VENIPUNCTURE: CPT | Performed by: EMERGENCY MEDICINE

## 2024-07-15 PROCEDURE — 83690 ASSAY OF LIPASE: CPT | Performed by: EMERGENCY MEDICINE

## 2024-07-15 PROCEDURE — 81001 URINALYSIS AUTO W/SCOPE: CPT | Mod: XU | Performed by: EMERGENCY MEDICINE

## 2024-07-15 PROCEDURE — 99284 EMERGENCY DEPT VISIT MOD MDM: CPT | Performed by: EMERGENCY MEDICINE

## 2024-07-15 PROCEDURE — 87635 SARS-COV-2 COVID-19 AMP PRB: CPT | Performed by: EMERGENCY MEDICINE

## 2024-07-15 PROCEDURE — 99283 EMERGENCY DEPT VISIT LOW MDM: CPT | Performed by: EMERGENCY MEDICINE

## 2024-07-15 RX ORDER — OLANZAPINE 10 MG/1
10 TABLET, ORALLY DISINTEGRATING ORAL
Status: COMPLETED | OUTPATIENT
Start: 2024-07-15 | End: 2024-07-15

## 2024-07-15 RX ADMIN — OLANZAPINE 10 MG: 10 TABLET, ORALLY DISINTEGRATING ORAL at 13:54

## 2024-07-15 ASSESSMENT — ACTIVITIES OF DAILY LIVING (ADL)
ADLS_ACUITY_SCORE: 37
ADLS_ACUITY_SCORE: 39

## 2024-07-15 NOTE — ED NOTES
MD evaluated patient. Discharge orders placed. Medical transport ordered back to her group home per her care plan.

## 2024-07-15 NOTE — ED TRIAGE NOTES
Patient reports feeling anxious, weak and shaky.      Triage Assessment (Adult)       Row Name 07/15/24 2167          Triage Assessment    Airway WDL WDL        Respiratory WDL    Respiratory WDL WDL        Skin Circulation/Temperature WDL    Skin Circulation/Temperature WDL WDL        Cardiac WDL    Cardiac WDL WDL        Peripheral/Neurovascular WDL    Peripheral Neurovascular WDL WDL        Cognitive/Neuro/Behavioral WDL    Cognitive/Neuro/Behavioral WDL WDL

## 2024-07-15 NOTE — ED PROVIDER NOTES
"    [facility] EMERGENCY DEPARTMENT ([facility])    7/15/24      ED PROVIDER NOTE   Hall[address]     History     Chief Complaint   Patient presents with    Nausea & Vomiting     Nausea and vomiting since she left ED yesterday. Patient vomiting 3x since this morning    Generalized Body Aches     Body aches since this morning     HPI  Sadaf Ross is a 23 year old female with hx of bipolar disorder, BPD, intellectual disability who is well-known to the emergency department for multiple presentations who presents with generalized body aches, nausea and vomiting that started after she left the ER yesterday.  She reports vomiting 3 times this morning and developing body aches this morning as well.  She denies any known exposures.  She lives at a group home and no one is sick there.       Physical Exam   BP: (!) 133/99  Pulse: 77  Temp: 98.4  F (36.9  C)  Resp: 18  Height: 160 cm (5' 3\")  Weight: 111.1 kg (245 lb)  SpO2: 99 %  Physical Exam  Vitals and nursing note reviewed.   Constitutional:       General: She is not in acute distress.     Appearance: She is obese. She is not ill-appearing, toxic-appearing or diaphoretic.   HENT:      Head: Normocephalic and atraumatic.      Nose: Nose normal.      Mouth/Throat:      Mouth: Mucous membranes are moist.   Eyes:      General: No scleral icterus.     Extraocular Movements: Extraocular movements intact.      Conjunctiva/sclera: Conjunctivae normal.   Cardiovascular:      Rate and Rhythm: Normal rate and regular rhythm.      Heart sounds: Normal heart sounds.   Pulmonary:      Effort: Pulmonary effort is normal.      Breath sounds: Normal breath sounds.   Abdominal:      General: There is no distension.      Palpations: Abdomen is soft. There is no mass.      Tenderness: There is no abdominal tenderness. There is no guarding or rebound.      Hernia: No hernia is present.   Musculoskeletal:         General: No deformity or signs of injury. Normal range of motion.      " Cervical back: Normal range of motion.   Skin:     Coloration: Skin is not jaundiced or pale.      Findings: No rash.   Neurological:      General: No focal deficit present.      Mental Status: She is alert and oriented to person, place, and time.   Psychiatric:         Attention and Perception: Attention and perception normal.         Mood and Affect: Mood normal.         Speech: Speech normal.         Behavior: Behavior normal. Behavior is cooperative.         Thought Content: Thought content normal.         Cognition and Memory: Cognition and memory normal.         Judgment: Judgment is impulsive (baseline).           ED Course, Procedures, & Data      Procedures   Results for orders placed or performed during the hospital encounter of 07/15/24   Comprehensive metabolic panel     Status: Abnormal   Result Value Ref Range    Sodium 141 135 - 145 mmol/L    Potassium 4.4 3.4 - 5.3 mmol/L    Carbon Dioxide (CO2) 22 22 - 29 mmol/L    Anion Gap 12 7 - 15 mmol/L    Urea Nitrogen 10.8 6.0 - 20.0 mg/dL    Creatinine 0.83 0.51 - 0.95 mg/dL    GFR Estimate >90 >60 mL/min/1.73m2    Calcium 9.5 8.6 - 10.0 mg/dL    Chloride 107 98 - 107 mmol/L    Glucose 103 (H) 70 - 99 mg/dL    Alkaline Phosphatase 62 40 - 150 U/L    AST 24 0 - 45 U/L    ALT 36 0 - 50 U/L    Protein Total 7.7 6.4 - 8.3 g/dL    Albumin 4.6 3.5 - 5.2 g/dL    Bilirubin Total 0.3 <=1.2 mg/dL   Lipase     Status: Normal   Result Value Ref Range    Lipase 35 13 - 60 U/L   HCG qualitative urine (UPT)     Status: Normal   Result Value Ref Range    hCG Urine Qualitative Negative Negative   UA with Microscopic reflex to Culture     Status: Abnormal    Specimen: Urine, Clean Catch   Result Value Ref Range    Color Urine Yellow Colorless, Straw, Light Yellow, Yellow    Appearance Urine Clear Clear    Glucose Urine Negative Negative mg/dL    Bilirubin Urine Negative Negative    Ketones Urine Negative Negative mg/dL    Specific Gravity Urine 1.034 1.003 - 1.035    Blood  Urine Negative Negative    pH Urine 6.0 5.0 - 7.0    Protein Albumin Urine 30 (A) Negative mg/dL    Urobilinogen Urine Normal Normal, 2.0 mg/dL    Nitrite Urine Negative Negative    Leukocyte Esterase Urine Negative Negative    Bacteria Urine Few (A) None Seen /HPF    Mucus Urine Present (A) None Seen /LPF    RBC Urine 1 <=2 /HPF    WBC Urine 1 <=5 /HPF    Squamous Epithelials Urine 4 (H) <=1 /HPF    Narrative    Urine Culture not indicated   Symptomatic COVID-19 Virus (Coronavirus) by PCR Oropharynx     Status: Normal    Specimen: Oropharynx; Swab   Result Value Ref Range    SARS CoV2 PCR Negative Negative    Narrative    Testing was performed using the Xpert Xpress SARS-CoV-2 Assay on the Cepheid Gene-Xpert Instrument Systems. Additional information about this Emergency Use Authorization (EUA) assay can be found via the Lab Guide. This test should be ordered for the detection of SARS-CoV-2 in individuals who meet SARS-CoV-2 clinical and/or epidemiological criteria as well as from individuals without symptoms or other reasons to suspect COVID-19. Test performance for asymptomatic patients has only been established in anterior nasal swab specimens. This test is for in vitro diagnostic use under the FDA EUA for laboratories certified under CLIA to perform high complexity testing. This test has not been FDA cleared or approved. A negative result does not rule out the presence of PCR inhibitors in the specimen or target RNA concentration below the limit of detection for the assay. The possibility of a false negative should be considered if the patient's recent exposure or clinical presentation suggests COVID-19. This test was validated by the [facility]. This laboratory is certified under the Clinical Laboratory Improvement Amendments (CLIA) as qualified to perform high complexity laboratory testing.     CBC with platelets and differential     Status: None   Result Value Ref Range     WBC Count 6.0 4.0 - 11.0 10e3/uL    RBC Count 4.67 3.80 - 5.20 10e6/uL    Hemoglobin 12.7 11.7 - 15.7 g/dL    Hematocrit 37.7 35.0 - 47.0 %    MCV 81 78 - 100 fL    MCH 27.2 26.5 - 33.0 pg    MCHC 33.7 31.5 - 36.5 g/dL    RDW 14.0 10.0 - 15.0 %    Platelet Count 217 150 - 450 10e3/uL    % Neutrophils 55 %    % Lymphocytes 30 %    % Monocytes 10 %    % Eosinophils 3 %    % Basophils 1 %    % Immature Granulocytes 1 %    NRBCs per 100 WBC 0 <1 /100    Absolute Neutrophils 3.4 1.6 - 8.3 10e3/uL    Absolute Lymphocytes 1.8 0.8 - 5.3 10e3/uL    Absolute Monocytes 0.6 0.0 - 1.3 10e3/uL    Absolute Eosinophils 0.2 0.0 - 0.7 10e3/uL    Absolute Basophils 0.0 0.0 - 0.2 10e3/uL    Absolute Immature Granulocytes 0.0 <=0.4 10e3/uL    Absolute NRBCs 0.0 10e3/uL   Urine Drug Screen Panel     Status: Normal   Result Value Ref Range    Amphetamines Urine Screen Negative Screen Negative    Barbituates Urine Screen Negative Screen Negative    Benzodiazepine Urine Screen Negative Screen Negative    Cannabinoids Urine Screen Negative Screen Negative    Cocaine Urine Screen Negative Screen Negative    Fentanyl Qual Urine Screen Negative Screen Negative    Opiates Urine Screen Negative Screen Negative    PCP Urine Screen Negative Screen Negative   CBC with platelets differential     Status: None    Narrative    The following orders were created for panel order CBC with platelets differential.  Procedure                               Abnormality         Status                     ---------                               -----------         ------                     CBC with platelets and d...[[id number]]                      Final result                 Please view results for these tests on the individual orders.   Urine Drug Screen     Status: Normal    Narrative    The following orders were created for panel order Urine Drug Screen.  Procedure                               Abnormality         Status                     ---------                                -----------         ------                     Urine Drug Screen Panel[[id number]]      Normal              Final result                 Please view results for these tests on the individual orders.     Medications   OLANZapine zydis (zyPREXA) ODT tab 10 mg (10 mg Oral $Given 7/15/24 5699)     Labs Ordered and Resulted from Time of ED Arrival to Time of ED Departure   COMPREHENSIVE METABOLIC PANEL - Abnormal       Result Value    Sodium 141      Potassium 4.4      Carbon Dioxide (CO2) 22      Anion Gap 12      Urea Nitrogen 10.8      Creatinine 0.83      GFR Estimate >90      Calcium 9.5      Chloride 107      Glucose 103 (*)     Alkaline Phosphatase 62      AST 24      ALT 36      Protein Total 7.7      Albumin 4.6      Bilirubin Total 0.3     ROUTINE UA WITH MICROSCOPIC REFLEX TO CULTURE - Abnormal    Color Urine Yellow      Appearance Urine Clear      Glucose Urine Negative      Bilirubin Urine Negative      Ketones Urine Negative      Specific Gravity Urine 1.034      Blood Urine Negative      pH Urine 6.0      Protein Albumin Urine 30 (*)     Urobilinogen Urine Normal      Nitrite Urine Negative      Leukocyte Esterase Urine Negative      Bacteria Urine Few (*)     Mucus Urine Present (*)     RBC Urine 1      WBC Urine 1      Squamous Epithelials Urine 4 (*)    LIPASE - Normal    Lipase 35     HCG QUALITATIVE URINE - Normal    hCG Urine Qualitative Negative     COVID-19 VIRUS (CORONAVIRUS) BY PCR - Normal    SARS CoV2 PCR Negative     URINE DRUG SCREEN PANEL - Normal    Amphetamines Urine Screen Negative      Barbituates Urine Screen Negative      Benzodiazepine Urine Screen Negative      Cannabinoids Urine Screen Negative      Cocaine Urine Screen Negative      Fentanyl Qual Urine Screen Negative      Opiates Urine Screen Negative      PCP Urine Screen Negative     CBC WITH PLATELETS AND DIFFERENTIAL    WBC Count 6.0      RBC Count 4.67      Hemoglobin 12.7      Hematocrit 37.7      MCV  "81      MCH 27.2      MCHC 33.7      RDW 14.0      Platelet Count 217      % Neutrophils 55      % Lymphocytes 30      % Monocytes 10      % Eosinophils 3      % Basophils 1      % Immature Granulocytes 1      NRBCs per 100 WBC 0      Absolute Neutrophils 3.4      Absolute Lymphocytes 1.8      Absolute Monocytes 0.6      Absolute Eosinophils 0.2      Absolute Basophils 0.0      Absolute Immature Granulocytes 0.0      Absolute NRBCs 0.0       No orders to display          Critical care was not performed.     Medical Decision Making  The patient's presentation was of moderate complexity (an acute illness with systemic symptoms).    The patient's evaluation involved:  review of external note(s) from 3+ sources (numerous ed visits/checked last 3)  review of 3+ test result(s) ordered prior to this encounter (see separate area of note for details)  ordering and/or review of 3+ test(s) in this encounter (see separate area of note for details)    The patient's management necessitated moderate risk (limitations due to social determinants of health (intellectual disability)).    Assessment & Plan    The patient has intellectual disability and BPD and comes frequently to the ER for either gi issues or suicidal thoughts.  She presents today with gi issues and states she was vomiting yesterday or today. She appears at baseline but labs were done to r/o any electrolyte concerns, check lipase which was normal, and normal wbc.  She then told nursing she was suicidal which is her baseline. We told her she can rest here and when feeling better just let us know.  She then was walking slowly towards the door without her backpack and I asked her what she was doing.  She said she was \"escaping\" and I explained to her that she was her own legal guardian, we are here to support her and she can leave when she feels comfortable.  She then returned to her bed and stated she was ready to go.  She was discharged at her baseline.     I have " reviewed the nursing notes. I have reviewed the findings, diagnosis, plan and need for follow up with the patient.    Discharge Medication List as of 7/15/2024  2:28 PM          Final diagnoses:   Nausea and vomiting, unspecified vomiting type       Ashely Toth MD  [facility] EMERGENCY DEPARTMENT  7/15/2024        Ashely Toth MD  07/16/24 1918

## 2024-07-16 ENCOUNTER — HOSPITAL ENCOUNTER (EMERGENCY)
Facility: CLINIC | Age: 24
Discharge: HOME | End: 2024-07-16
Attending: EMERGENCY MEDICINE | Admitting: EMERGENCY MEDICINE
Payer: MEDICARE

## 2024-07-16 ENCOUNTER — HOSPITAL ENCOUNTER (EMERGENCY)
Facility: CLINIC | Age: 24
Discharge: HOME | End: 2024-07-16
Payer: MEDICARE

## 2024-07-16 ENCOUNTER — HOSPITAL ENCOUNTER (EMERGENCY)
Facility: CLINIC | Age: 24
Discharge: HOME | End: 2024-07-16
Attending: FAMILY MEDICINE | Admitting: FAMILY MEDICINE
Payer: MEDICARE

## 2024-07-16 DIAGNOSIS — R45.851 PASSIVE SUICIDAL IDEATIONS: ICD-10-CM

## 2024-07-16 DIAGNOSIS — F79 INTELLECTUAL DISABILITY: Chronic | ICD-10-CM

## 2024-07-16 DIAGNOSIS — G47.00 INSOMNIA, UNSPECIFIED TYPE: ICD-10-CM

## 2024-07-16 DIAGNOSIS — F60.3 BORDERLINE PERSONALITY DISORDER (H): ICD-10-CM

## 2024-07-16 DIAGNOSIS — F60.3 BORDERLINE PERSONALITY DISORDER (H): Chronic | ICD-10-CM

## 2024-07-16 PROCEDURE — 99283 EMERGENCY DEPT VISIT LOW MDM: CPT | Mod: 27 | Performed by: EMERGENCY MEDICINE

## 2024-07-16 PROCEDURE — 99284 EMERGENCY DEPT VISIT MOD MDM: CPT | Performed by: EMERGENCY MEDICINE

## 2024-07-16 PROCEDURE — 99284 EMERGENCY DEPT VISIT MOD MDM: CPT | Performed by: FAMILY MEDICINE

## 2024-07-16 PROCEDURE — 99283 EMERGENCY DEPT VISIT LOW MDM: CPT | Performed by: FAMILY MEDICINE

## 2024-07-16 ASSESSMENT — COLUMBIA-SUICIDE SEVERITY RATING SCALE - C-SSRS
3. HAVE YOU BEEN THINKING ABOUT HOW YOU MIGHT KILL YOURSELF?: NO
4. HAVE YOU HAD THESE THOUGHTS AND HAD SOME INTENTION OF ACTING ON THEM?: YES
6. HAVE YOU EVER DONE ANYTHING, STARTED TO DO ANYTHING, OR PREPARED TO DO ANYTHING TO END YOUR LIFE?: YES
5. HAVE YOU STARTED TO WORK OUT OR WORKED OUT THE DETAILS OF HOW TO KILL YOURSELF? DO YOU INTEND TO CARRY OUT THIS PLAN?: NO
1. IN THE PAST MONTH, HAVE YOU WISHED YOU WERE DEAD OR WISHED YOU COULD GO TO SLEEP AND NOT WAKE UP?: YES
2. HAVE YOU ACTUALLY HAD ANY THOUGHTS OF KILLING YOURSELF IN THE PAST MONTH?: YES

## 2024-07-16 ASSESSMENT — ACTIVITIES OF DAILY LIVING (ADL)
ADLS_ACUITY_SCORE: 39
ADLS_ACUITY_SCORE: 39

## 2024-07-16 NOTE — ED NOTES
Bed: [facility]  Expected date: 7/16/24  Expected time: 3:00 AM  Means of arrival:   Comments:  [address]  23 F green  Feeling overwheled

## 2024-07-16 NOTE — DISCHARGE INSTRUCTIONS
Please make an appointment to follow up with your therapist and/or Psychiatric Clinic (phone: [phone]) within 1-3 days.     Return to the ED if you are having worsening thoughts/feelings of harming yourself or others, or any urgent/life-threatening concerns.     DISCHARGE RESOURCES:  -[facility] [phone]   -[facility] [phone] or [phone].  -Crisis Intervention: [phone] or [phone] (TTY: [phone]).  Call anytime.  -Suicide Awareness Voices of Education (SAVE) (www.save.org): 517-026-EMCR (6790)  -National Suicide Prevention Line (www.mentalhealthmn.org): 869-246-LAQQ (7548)  -National [place] on Mental Illness (www.mn.celine.org): 572.576.6630 or 776-655-7954.  -[employer]: text the word LIFE to 85455 for immediate support and crisis intervention  -Mental Health Consumer/Survivor Network of [place] (www.mhcsn.net): 244.127.1214 or 022-872-7549  -Mental Health Association of [place] (www.mentalhealth.org): 517.448.2397 or [phone]

## 2024-07-16 NOTE — ED NOTES
Patient is refusing to have a full search done, have her vitals taken or answer any questions. Patient just continues to shake her head.

## 2024-07-16 NOTE — ED PROVIDER NOTES
History     Chief Complaint   Patient presents with    Suicidal     Overwhelmed.     HPI  Sadaf Ross is a 23 year old female with PMH notable for borderline personality disorder, bipolar 1 disorder, intellectual disability, very frequent ED presentations  who presents to the ED with feeling overwhelmed.  Patient reports that she has been feeling very stressed recently.  She is having desire to run away.  She endorses some thoughts of suicide, hoping that she would be hit by car.  She denies any particular stressor.  She does report sleeping poorly, often plays word search games all night.    Past Medical History  Past Medical History:   Diagnosis Date    ADHD (attention deficit hyperactivity disorder)     Bipolar 1 disorder (H)     Borderline personality disorder (H)     Depressive disorder     Intellectual disability     Obesity     Syncope      Past Surgical History:   Procedure Laterality Date    APPENDECTOMY       doxylamine (UNISOM) 25 MG TABS tablet  acetaminophen (TYLENOL) 325 MG tablet  ARIPiprazole lauroxil ER (ARISTADA) 882 MG/3.2ML intra-muscular  bisacodyl (DULCOLAX) 5 MG EC tablet  cloNIDine (CATAPRES) 0.1 MG tablet  diclofenac (VOLTAREN) 1 % topical gel  dicyclomine (BENTYL) 20 MG tablet  divalproex sodium extended-release (DEPAKOTE ER) 250 MG 24 hr tablet  docusate sodium (COLACE) 50 MG capsule  famotidine (PEPCID) 20 MG tablet  FLUoxetine (PROZAC) 40 MG capsule  hydrOXYzine (ATARAX) 50 MG tablet  levothyroxine (SYNTHROID/LEVOTHROID) 25 MCG tablet  loperamide (IMODIUM A-D) 2 MG tablet  OLANZapine zydis (ZYPREXA) 5 MG ODT  ondansetron (ZOFRAN) 4 MG tablet  pantoprazole (PROTONIX) 40 MG EC tablet  phenazopyridine (PYRIDIUM) 100 MG tablet  promethazine (PHENERGAN) 12.5 MG tablet  senna-docusate (SENOKOT-S/PERICOLACE) 8.6-50 MG tablet  traZODone (DESYREL) 50 MG tablet  Vitamin D, Cholecalciferol, 25 MCG (1000 UT) TABS      Allergies   Allergen Reactions    Penicillins Rash and Unknown     Family  History  Family History   Problem Relation Age of Onset    Diabetes Type 1 Father     Cancer Paternal Grandfather      Social History   Social History     Tobacco Use    Smoking status: Former     Current packs/day: 0.25     Average packs/day: 0.3 packs/day for 5.0 years (1.3 ttl pk-yrs)     Types: Cigarettes, Vaping Device    Smokeless tobacco: Former   Substance Use Topics    Alcohol use: No    Drug use: No         Review of Systems  A complete review of systems was performed with pertinent positives and negatives noted in the HPI, and all other systems negative.     Physical Exam        Physical Exam  General: No acute distress. Appears stated age.   HENT: MMM, no oropharyngeal lesions  Eyes: PERRL, normal sclerae   Cardio: Regular rate, extremities well perfused  Resp: Normal work of breathing, normal respiratory rate  Neuro: alert and fully oriented. CN II-XII grossly intact. Grossly normal strength and sensation in all extremities.   MSK: no deformities.   Integumentary/Skin: no rash visualized, normal color  Psych: Somewhat flat affect, somewhat withdrawn behavior.  Passive SI.  No HI.  No hallucinations. Thought process near. Insight poor.    ED Course      Procedures              Labs Ordered and Resulted from Time of ED Arrival to Time of ED Departure - No data to display  No orders to display        Medical Decision Making  The patient's presentation was of moderate complexity (a chronic illness mild to moderate exacerbation, progression, or side effect of treatment).    The patient's evaluation involved:  review of external note(s) from 2 sources (care coordination note, DEC)  review of 3+ test result(s) ordered prior to this encounter (serum labs, COVID-19 swab, and urine studies from yesterday)    The patient's management necessitated moderate risk (limitations due to social determinants of health (group home resident and social isolation)).      Assessments & Plan    Patient presenting with feeling  overwhelmed, having some passive suicidal ideation. Nursing notes reviewed.  Care coordination note containing care plan was reviewed.  Most recent DEC  note was reviewed.  Patient notably has chronic suicidal ideation, presents to the ED most days.  Patient was just in the ED this past afternoon, had broad laboratory workup including CMP, lipase, hCG, CBC, UA, COVID screen, UDS.  All those labs were unremarkable.    Patient at her baseline state.  In discussion, patient did note difficulty sleeping, takes trazodone but states it does not help.  Patient often does present in the middle of the night, reports that being [place] does make her feel worse.    The complete clinical picture is most consistent with borderline personality disorder, chronic suicidal ideation. After counseling on the diagnosis, work-up, and treatment plan, the patient was discharged to group home.  Unisom prescription provided. The patient was advised to follow-up with her outpatient psychiatric providers in few days. The patient was advised to return to the ED if worsening symptoms, or if there are any urgent health concerns.     Final diagnoses:   Borderline personality disorder (H)   Insomnia, unspecified type   Passive suicidal ideations     Discharge Medication List as of 7/16/2024  3:48 AM        START taking these medications    Details   doxylamine (UNISOM) 25 MG TABS tablet Take 1 tablet (25 mg) by mouth at bedtime, Disp-30 tablet, R-0, E-Prescribe             --  Kunal Manriquez MD   Emergency Medicine   [facility] EMERGENCY DEPARTMENT  7/16/2024     Kunal Manriquez MD  07/16/24 8628

## 2024-07-17 ENCOUNTER — HOSPITAL ENCOUNTER (EMERGENCY)
Facility: CLINIC | Age: 24
Discharge: NURSING, REHAB OR LONG-TERM CARE | End: 2024-07-17
Attending: EMERGENCY MEDICINE | Admitting: EMERGENCY MEDICINE
Payer: MEDICARE

## 2024-07-17 ENCOUNTER — HOSPITAL ENCOUNTER (EMERGENCY)
Facility: CLINIC | Age: 24
Discharge: HOME | End: 2024-07-17
Attending: EMERGENCY MEDICINE | Admitting: EMERGENCY MEDICINE
Payer: MEDICARE

## 2024-07-17 ENCOUNTER — HOSPITAL ENCOUNTER (EMERGENCY)
Facility: CLINIC | Age: 24
Discharge: LEFT BEFORE DISCHARGE | End: 2024-07-17
Attending: STUDENT IN AN ORGANIZED HEALTH CARE EDUCATION/TRAINING PROGRAM
Payer: MEDICARE

## 2024-07-17 VITALS
SYSTOLIC BLOOD PRESSURE: 133 MMHG | TEMPERATURE: 97.8 F | OXYGEN SATURATION: 97 % | DIASTOLIC BLOOD PRESSURE: 87 MMHG | RESPIRATION RATE: 16 BRPM | HEART RATE: 92 BPM

## 2024-07-17 DIAGNOSIS — F79 INTELLECTUAL DISABILITY: ICD-10-CM

## 2024-07-17 DIAGNOSIS — F60.3 BORDERLINE PERSONALITY DISORDER (H): ICD-10-CM

## 2024-07-17 DIAGNOSIS — Z86.59 HISTORY OF BIPOLAR DISORDER: ICD-10-CM

## 2024-07-17 PROCEDURE — 99284 EMERGENCY DEPT VISIT MOD MDM: CPT | Performed by: EMERGENCY MEDICINE

## 2024-07-17 PROCEDURE — 99283 EMERGENCY DEPT VISIT LOW MDM: CPT | Mod: 27 | Performed by: EMERGENCY MEDICINE

## 2024-07-17 PROCEDURE — 99283 EMERGENCY DEPT VISIT LOW MDM: CPT | Performed by: EMERGENCY MEDICINE

## 2024-07-17 RX ORDER — OLANZAPINE 10 MG/1
10 TABLET, ORALLY DISINTEGRATING ORAL ONCE
Status: COMPLETED | OUTPATIENT
Start: 2024-07-17 | End: 2024-07-17

## 2024-07-17 ASSESSMENT — ACTIVITIES OF DAILY LIVING (ADL)
ADLS_ACUITY_SCORE: 39

## 2024-07-17 ASSESSMENT — COLUMBIA-SUICIDE SEVERITY RATING SCALE - C-SSRS
1. IN THE PAST MONTH, HAVE YOU WISHED YOU WERE DEAD OR WISHED YOU COULD GO TO SLEEP AND NOT WAKE UP?: YES
5. HAVE YOU STARTED TO WORK OUT OR WORKED OUT THE DETAILS OF HOW TO KILL YOURSELF? DO YOU INTEND TO CARRY OUT THIS PLAN?: YES
1. IN THE PAST MONTH, HAVE YOU WISHED YOU WERE DEAD OR WISHED YOU COULD GO TO SLEEP AND NOT WAKE UP?: YES
3. HAVE YOU BEEN THINKING ABOUT HOW YOU MIGHT KILL YOURSELF?: YES
4. HAVE YOU HAD THESE THOUGHTS AND HAD SOME INTENTION OF ACTING ON THEM?: NO
6. HAVE YOU EVER DONE ANYTHING, STARTED TO DO ANYTHING, OR PREPARED TO DO ANYTHING TO END YOUR LIFE?: YES
6. HAVE YOU EVER DONE ANYTHING, STARTED TO DO ANYTHING, OR PREPARED TO DO ANYTHING TO END YOUR LIFE?: YES
2. HAVE YOU ACTUALLY HAD ANY THOUGHTS OF KILLING YOURSELF IN THE PAST MONTH?: YES
4. HAVE YOU HAD THESE THOUGHTS AND HAD SOME INTENTION OF ACTING ON THEM?: YES
2. HAVE YOU ACTUALLY HAD ANY THOUGHTS OF KILLING YOURSELF IN THE PAST MONTH?: YES
5. HAVE YOU STARTED TO WORK OUT OR WORKED OUT THE DETAILS OF HOW TO KILL YOURSELF? DO YOU INTEND TO CARRY OUT THIS PLAN?: YES
3. HAVE YOU BEEN THINKING ABOUT HOW YOU MIGHT KILL YOURSELF?: YES

## 2024-07-17 NOTE — ED TRIAGE NOTES
Triage Assessment (Adult)       Row Name 07/16/24 2004          Triage Assessment    Airway WDL WDL        Respiratory WDL    Respiratory WDL WDL        Skin Circulation/Temperature WDL    Skin Circulation/Temperature WDL WDL        Cardiac WDL    Cardiac WDL WDL        Peripheral/Neurovascular WDL    Peripheral Neurovascular WDL WDL        Cognitive/Neuro/Behavioral WDL    Cognitive/Neuro/Behavioral WDL WDL

## 2024-07-17 NOTE — ED TRIAGE NOTES
Came from [facility] ambulance picked up there, Pt endorses SI and anxiety.      Triage Assessment (Adult)       Row Name 07/17/24 8262          Triage Assessment    Airway WDL WDL        Respiratory WDL    Respiratory WDL WDL        Skin Circulation/Temperature WDL    Skin Circulation/Temperature WDL WDL        Cardiac WDL    Cardiac WDL WDL        Peripheral/Neurovascular WDL    Peripheral Neurovascular WDL WDL        Cognitive/Neuro/Behavioral WDL    Cognitive/Neuro/Behavioral WDL WDL

## 2024-07-17 NOTE — ED NOTES
Bed: [facility]  Expected date: 7/16/24  Expected time: 7:45 PM  Means of arrival: Ambulance  Comments:  [address] 24yo female, suicidal

## 2024-07-17 NOTE — ED PROVIDER NOTES
[facility] EMERGENCY DEPARTMENT ([facility])    7/16/24      ED PROVIDER NOTE       History     Chief Complaint   Patient presents with    Suicidal     Pt stated lonely.  SI with no plan      HPI  Sadaf Ross is a 23 year old female who presents with suicidal ideation.  Patient states that she has still been struggling with going back to her group home after she had time away with her family and had been having suicidal thoughts on arrival here in the emergency room she is already de-escalating and is essentially at baseline and is discussing safety plans moving forward returning to her group home.    ED CARE PLAN     The pt has become increasingly disruptive in the Emergency Department.  The pt will yell, demand, and scream and disrupt the milieu, and this happens before she finds out the recommendation to send her back to her group home.  Today, the pt postured toward the ED doctor and threatened to  punch him.   During one of her ED visits over the past week, the pt befriended another patient who is waiting for an inpatient bed.  The pt has been texting and contacting this patient.  There is concern that one of the reasons the pt is coming to the ED is to interact with this patient.        An Emergency Department Care Plan is being sought in order to reduce and extinguish the pt s numerous ED visits.  The pt does have an active outpatient team that the pt can utilize for support and lives in a group home with 24/7 staffing.  Typically, the pt calls 911, not group home staff.  Additionally, [facility] have attempted to recommend additionally services, such as day treatment, and the pt refuses the recommendations.  It appears that the pt comes to the ED, requesting admission as she does not like her group home.        To that end, it is recommended that if the pt returns to the Emergency Department, she be triaged and if there is not any acute new symptoms, both medically and mental health-wise, the group  home be called and informed that the pt is returning and medical transport be set up for her return.  If the pt becomes dysregulated, the pt should be offered/given appropriate medications.  This should not hinder her return to her group home.       Past Medical History  Past Medical History:   Diagnosis Date    ADHD (attention deficit hyperactivity disorder)     Bipolar 1 disorder (H)     Borderline personality disorder (H)     Depressive disorder     Intellectual disability     Obesity     Syncope      Past Surgical History:   Procedure Laterality Date    APPENDECTOMY       acetaminophen (TYLENOL) 325 MG tablet  ARIPiprazole lauroxil ER (ARISTADA) 882 MG/3.2ML intra-muscular  bisacodyl (DULCOLAX) 5 MG EC tablet  cloNIDine (CATAPRES) 0.1 MG tablet  diclofenac (VOLTAREN) 1 % topical gel  dicyclomine (BENTYL) 20 MG tablet  divalproex sodium extended-release (DEPAKOTE ER) 250 MG 24 hr tablet  docusate sodium (COLACE) 50 MG capsule  doxylamine (UNISOM) 25 MG TABS tablet  famotidine (PEPCID) 20 MG tablet  FLUoxetine (PROZAC) 40 MG capsule  hydrOXYzine (ATARAX) 50 MG tablet  levothyroxine (SYNTHROID/LEVOTHROID) 25 MCG tablet  loperamide (IMODIUM A-D) 2 MG tablet  OLANZapine zydis (ZYPREXA) 5 MG ODT  ondansetron (ZOFRAN) 4 MG tablet  pantoprazole (PROTONIX) 40 MG EC tablet  phenazopyridine (PYRIDIUM) 100 MG tablet  promethazine (PHENERGAN) 12.5 MG tablet  senna-docusate (SENOKOT-S/PERICOLACE) 8.6-50 MG tablet  traZODone (DESYREL) 50 MG tablet  Vitamin D, Cholecalciferol, 25 MCG (1000 UT) TABS      Allergies   Allergen Reactions    Penicillins Rash and Unknown     Family History  Family History   Problem Relation Age of Onset    Diabetes Type 1 Father     Cancer Paternal Grandfather      Social History   Social History     Tobacco Use    Smoking status: Former     Current packs/day: 0.25     Average packs/day: 0.3 packs/day for 5.0 years (1.3 ttl pk-yrs)     Types: Cigarettes, Vaping Device    Smokeless tobacco: Former    Substance Use Topics    Alcohol use: No    Drug use: No      A medically appropriate review of systems was performed with pertinent positives and negatives noted in the HPI, and all other systems negative.    Physical Exam   BP: (P) 119/68  Temp: (P) 98.6  F (37  C)  SpO2: (P) 98 %  Physical Exam  Constitutional:       General: She is not in acute distress.     Appearance: Normal appearance. She is not diaphoretic.   HENT:      Head: Atraumatic.      Mouth/Throat:      Mouth: Mucous membranes are moist.   Eyes:      General: No scleral icterus.     Conjunctiva/sclera: Conjunctivae normal.   Cardiovascular:      Rate and Rhythm: Normal rate.      Heart sounds: Normal heart sounds.   Pulmonary:      Effort: No respiratory distress.      Breath sounds: Normal breath sounds.   Abdominal:      General: Abdomen is flat.   Musculoskeletal:      Cervical back: Neck supple.   Skin:     General: Skin is warm.      Findings: No rash.   Neurological:      General: No focal deficit present.      Mental Status: She is alert and oriented to person, place, and time.      Sensory: No sensory deficit.      Motor: No weakness.      Coordination: Coordination normal.   Psychiatric:         Mood and Affect: Mood is anxious.           ED Course, Procedures, & Data      Procedures     No results found for any visits on 07/16/24.  Medications - No data to display  Labs Ordered and Resulted from Time of ED Arrival to Time of ED Departure - No data to display  No orders to display          Critical care was not performed.     Medical Decision Making  The patient's presentation was of moderate complexity (a chronic illness mild to moderate exacerbation, progression, or side effect of treatment).    The patient's evaluation involved:  history and exam without other MDM data elements    The patient's management necessitated moderate risk (patient with chronic suicidal ideation difficult to fully assess whether or not she may try and harm  herself she does speak of forward thinking and at this time is able to safety plan will be discharged back to her group home.).    Assessment & Plan        I have reviewed the nursing notes. I have reviewed the findings, diagnosis, plan and need for follow up with the patient.        Final diagnoses:   Intellectual disability   Borderline personality disorder (H)       Robert Olea  [facility] EMERGENCY DEPARTMENT  7/16/2024     Robert Olea MD  07/17/24 0325

## 2024-07-17 NOTE — ED PROVIDER NOTES
ED Provider Note  [facility]      History     Chief Complaint   Patient presents with    Suicidal    Anxiety     Called [place] ambulance picked up at group home. VSS     HPI  Sadaf Ross is a 23 year old female with intellectual disability, BPD who presents to the ED for a headache.  She says the lights are bright.  She lives at a group home and is her own legal guardian.  She admits that it has been hard this past week.  Over the 4th of July weekend she went north with her mother and had a really great time. It has been hard being back and doesn't get to see her mother very much. She is her own legal guardian.     Physical Exam      Physical Exam  Vitals and nursing note reviewed.   Constitutional:       Comments: Wearing sunglasses.  Starts to laugh during the evaluation.  Moves easily.    HENT:      Head: Normocephalic and atraumatic.      Nose: No congestion or rhinorrhea.   Cardiovascular:      Rate and Rhythm: Normal rate.   Pulmonary:      Effort: Pulmonary effort is normal.   Musculoskeletal:         General: No deformity or signs of injury.      Cervical back: Normal range of motion.   Skin:     Coloration: Skin is not jaundiced or pale.   Neurological:      General: No focal deficit present.      Mental Status: She is alert and oriented to person, place, and time.   Psychiatric:         Attention and Perception: Attention and perception normal.         Mood and Affect: Mood and affect normal.         Speech: Speech normal.         Behavior: Behavior normal. Behavior is cooperative.         Thought Content: Thought content normal.         Judgment: Judgment is impulsive.           ED Course, Procedures, & Data      Procedures            No results found for any visits on 07/17/24.  Medications - No data to display  Labs Ordered and Resulted from Time of ED Arrival to Time of ED Departure - No data to display  No orders to display          Critical care was not performed.      Medical Decision Making  The patient's presentation was of low complexity (a stable chronic illness).    The patient's evaluation involved:  review of external note(s) from 3+ sources (last 3 ed visits)    The patient's management necessitated moderate risk (limitations due to social determinants of health (social isolation)).    Assessment & Plan    Sadaf Ross is a 23 year old female with intellectual disability, BPD who presents to the ED for a headache.  She says the lights are bright.  The patient comes frequently to the ED typically for GI issues or suicidal thoughts. She is always at baseline and seems to use the ED for either boredom or to manage her stress. She had a really fun weekend with her mother last weekend and has been struggling since.  We discussed working with her  Minerva about doing more in the community so Sadaf isn't so bored.  She worries about group settings and worries it could be difficult for her.  She is feeling better.  Headache seemed to resolve with discussion of life stressors. She was discharged.      I have reviewed the nursing notes. I have reviewed the findings, diagnosis, plan and need for follow up with the patient.    New Prescriptions    No medications on file       Final diagnoses:   Intellectual disability   History of bipolar disorder       Ashely Toth MD  [facility] EMERGENCY DEPARTMENT  7/17/2024     Ashely Toth MD  07/17/24 3919

## 2024-07-17 NOTE — ED NOTES
Bed: URE-K  Expected date: 7/17/24  Expected time: 2:02 PM  Means of arrival:   Comments:  N- 724: 24 y/O, F, SI

## 2024-07-18 ENCOUNTER — HOSPITAL ENCOUNTER (EMERGENCY)
Facility: CLINIC | Age: 24
Discharge: HOME | End: 2024-07-18
Attending: EMERGENCY MEDICINE | Admitting: EMERGENCY MEDICINE
Payer: MEDICARE

## 2024-07-18 ENCOUNTER — HOSPITAL ENCOUNTER (EMERGENCY)
Facility: CLINIC | Age: 24
Discharge: LEFT BEFORE DISCHARGE | End: 2024-07-18
Attending: FAMILY MEDICINE
Payer: MEDICARE

## 2024-07-18 DIAGNOSIS — F60.3 BORDERLINE PERSONALITY DISORDER (H): ICD-10-CM

## 2024-07-18 DIAGNOSIS — F31.9 BIPOLAR 1 DISORDER (H): ICD-10-CM

## 2024-07-18 PROCEDURE — 99283 EMERGENCY DEPT VISIT LOW MDM: CPT | Performed by: EMERGENCY MEDICINE

## 2024-07-18 ASSESSMENT — ACTIVITIES OF DAILY LIVING (ADL): ADLS_ACUITY_SCORE: 39

## 2024-07-18 NOTE — ED PROVIDER NOTES
"ED Provider Note  [facility]      History     Chief Complaint   Patient presents with    Suicidal     Crying and stating, \"Just let me go.\"     HPI  Sadaf Ross is a 23 year old female with a history of intellectual disability, borderline personality disorder, bipolar 1 disorder, with frequent ED visits who presents to the emergency department for suicidal ideation.  Per EMS note, patient spoke with crisis line who [place].  She was found at an intersection crying and stated that she wanted to walk into traffic.  Patient requested to come here and got upset with EMS when they offered to bring her to another facility other than [facility]. According to ems she called ems and was at an intersection crying and saying she wanted to walk into traffic.          Physical Exam   BP: (!) 157/99  Pulse: 107  Temp: 98.3  F (36.8  C)  Resp: 16  SpO2: 97 %  Physical Exam  Vitals and nursing note reviewed.   Constitutional:       Appearance: She is obese.      Comments: tearful   HENT:      Head: Normocephalic and atraumatic.      Nose: Nose normal.   Eyes:      Extraocular Movements: Extraocular movements intact.   Cardiovascular:      Rate and Rhythm: Normal rate.   Pulmonary:      Effort: Pulmonary effort is normal.   Musculoskeletal:         General: No signs of injury.      Cervical back: Normal range of motion.   Skin:     Coloration: Skin is not jaundiced or pale.   Neurological:      General: No focal deficit present.      Mental Status: She is alert and oriented to person, place, and time.   Psychiatric:         Attention and Perception: Attention and perception normal.         Mood and Affect: Mood is anxious and depressed. Affect is tearful.         Speech: Speech normal.         Behavior: Behavior normal. Behavior is cooperative.         Thought Content: Thought content is not paranoid or delusional. Thought content includes suicidal ideation. Thought content does " not include homicidal ideation. Thought content includes suicidal (walk into traffic) plan. Thought content does not include homicidal plan.         Cognition and Memory: Memory normal.         Judgment: Judgment is impulsive.           ED Course, Procedures, & Data      Procedures       No results found for any visits on 07/17/24.  Medications - No data to display  Labs Ordered and Resulted from Time of ED Arrival to Time of ED Departure - No data to display  No orders to display          Critical care was not performed.     Medical Decision Making  The patient's presentation was of moderate complexity (a chronic illness mild to moderate exacerbation, progression, or side effect of treatment).    The patient's evaluation involved:  review of external note(s) from 3+ sources (this am visit, ed visits yesterday)  discussion of management or test interpretation with another health professional (evening provider)    The patient's management necessitated further care after sign-out to Dr. Dudley (see their note for further management).    Assessment & Plan    The patient is well known to the ED department and comes for the 3rd time today.  She has hx of coming to the ED multiple times a week and this is longstanding behavior. She lives in a group home. She had a very fun weekend over the 4th of July and has been struggling since.  Earlier today she was here for migraine.  She returns now with suicidal thoughts. She frequently presents with suicidal thoughts, rests for a few hours, gets bored and goes home.  She doesn't want to talk to me right now and wants to sleep.  I will sign out her case to evening provider to see how she's doing in a few hours.  Zyprexa 10 mg po offered.     I have reviewed the nursing notes. I have reviewed the findings, diagnosis, plan and need for follow up with the patient.    New Prescriptions    No medications on file       Final diagnoses:   Intellectual disability   Borderline personality  disorder (H)       Ashely Toth MD  [facility] EMERGENCY DEPARTMENT  7/17/2024     Ashely Toth MD  07/17/24 2101       Ashely Toth MD  07/17/24 2101

## 2024-07-18 NOTE — ED NOTES
[facility] ED Mental Health Handoff Note:       Brief HPI:  This is a 23 year old female signed out to me by Dr. Toth.  See initial ED Provider note for full details of the presentation. Intellectual disability and borderline disorder Interval history is pertinent for agitation.    Home meds reviewed and ordered/administered: Yes    Medically stable for inpatient mental health admission: Yes.    Evaluated by mental health: No    Safety concerns: At the time I received sign out, there were no safety concerns.    Hold Status:  Active Orders   Legal    Health Officer Authority to Detain (ROBERT)     Frequency: Effective Now     Start Date/Time: 07/17/24 2017      Number of Occurrences: Until Specified     Order Comments: This patient presented with circumstances that have led me to be reasonably suspicious that the patient is at significant risk of self-harm. The patient's judgment to this situation appears to be impaired. Given the circumstances in which the patient presented, it is likely that the patient is at significant risk of attempting self harm if this situation is not investigated further. I am highly concerned that the patient is mentally ill and currently cannot safely care for oneself. This represents endangerment to the patient's well-being and safety, and I am placing a Health Officer Authority hold upon the patient at this time.              Exam:   Patient Vitals for the past 24 hrs:   BP Temp Temp src Pulse Resp SpO2   07/17/24 2010 (!) 157/99 98.3  F (36.8  C) Oral 107 16 97 %           ED Course:    Medications   OLANZapine zydis (zyPREXA) ODT tab 10 mg (has no administration in time range)            There were no significant events during my shift.    Patient was signed out to the oncoming provider,        Impression:    ICD-10-CM    1. Intellectual disability  F79       2. Borderline personality disorder (H)  F60.3           Plan:    Discharged. Pt is now calm and directable, discharge as  planned per Dr Toth.      RESULTS:   No results found for this visit on 07/17/24 (from the past 24 hour(s)).          Florina Dudley MD, Florina Law MD  07/17/24 1146

## 2024-07-18 NOTE — ED TRIAGE NOTES
Triage Assessment (Adult)       Row Name 07/17/24 2011          Triage Assessment    Airway WDL WDL        Respiratory WDL    Respiratory WDL WDL        Skin Circulation/Temperature WDL    Skin Circulation/Temperature WDL WDL        Cardiac WDL    Cardiac WDL WDL        Peripheral/Neurovascular WDL    Peripheral Neurovascular WDL WDL        Cognitive/Neuro/Behavioral WDL    Cognitive/Neuro/Behavioral WDL WDL

## 2024-07-18 NOTE — ED TRIAGE NOTES
[facility]    Patient spoke with crisis line who called [place] dispatch.  Was  at an intersection crying and stating she wanted to walk into traffic.  Requested to come here and got upset with EMS when they offered to bring her to another facility other than [facility].

## 2024-07-19 ENCOUNTER — HOSPITAL ENCOUNTER (EMERGENCY)
Facility: CLINIC | Age: 24
Discharge: HOME | End: 2024-07-19
Payer: MEDICARE

## 2024-07-19 ENCOUNTER — HOSPITAL ENCOUNTER (EMERGENCY)
Facility: CLINIC | Age: 24
Discharge: HOME | End: 2024-07-19
Attending: PSYCHIATRY & NEUROLOGY | Admitting: PSYCHIATRY & NEUROLOGY
Payer: MEDICARE

## 2024-07-19 VITALS
TEMPERATURE: 98.2 F | DIASTOLIC BLOOD PRESSURE: 98 MMHG | OXYGEN SATURATION: 99 % | RESPIRATION RATE: 16 BRPM | HEART RATE: 103 BPM | SYSTOLIC BLOOD PRESSURE: 160 MMHG

## 2024-07-19 DIAGNOSIS — F43.0 ACUTE REACTION TO SITUATIONAL STRESS: ICD-10-CM

## 2024-07-19 PROCEDURE — 99283 EMERGENCY DEPT VISIT LOW MDM: CPT | Performed by: PSYCHIATRY & NEUROLOGY

## 2024-07-19 PROCEDURE — 99284 EMERGENCY DEPT VISIT MOD MDM: CPT | Performed by: PSYCHIATRY & NEUROLOGY

## 2024-07-19 NOTE — DISCHARGE INSTRUCTIONS
Home tonight    Take your medications as directed    Please make an appointment to follow up with Your Primary Care Provider  as needed.

## 2024-07-19 NOTE — ED NOTES
Pt was again called but not available in triage lobby. Pt was not also in the BR. This is the 4th call, has been called > 30 mins.

## 2024-07-19 NOTE — ED NOTES
Bed: POLLO-A  Expected date: 7/18/24  Expected time: 10:11 PM  Means of arrival:   Comments:  Gaby Aponte 733 24 y/o F a [facility]

## 2024-07-19 NOTE — ED TRIAGE NOTES
Called EMS from her cell phone. Stated she suicidal with the plan to cut self or bite self to death. Pt become agitated when EMS called her group home or her family, Pt has was uncooperative with EMS but requested to come to [facility].

## 2024-07-19 NOTE — ED NOTES
"Pt was called but was not available. BR was also checked but no patient. Per Security \" she stepped outside\"  "

## 2024-07-19 NOTE — ED PROVIDER NOTES
ED PROVIDER NOTE  [facility]  [facility]      History     Chief Complaint   Patient presents with    Suicidal     HPI  Sadaf Ross is a 23 year old female frequent visitor to the ER and well-known to personnel here who presents from her group home for another evaluation of suicidal ideation.  Patient states that she has been suicidal but has no new SI from previous and states that these thoughts are stable.  Patient has no plan to harm herself and actually wants to go home at this point.  Patient guarantee she can be safe at her living facility.    I have reviewed the Medications, Allergies, Past Medical and Surgical History, and Social History in the [facility] system.    Past Medical History:   Diagnosis Date    ADHD (attention deficit hyperactivity disorder)     Bipolar 1 disorder (H)     Borderline personality disorder (H)     Depressive disorder     Intellectual disability     Obesity     Syncope        Past Surgical History:   Procedure Laterality Date    APPENDECTOMY          Review of your medicines        UNREVIEWED medicines. Ask your doctor about these medicines        Dose / Directions   acetaminophen 325 MG tablet  Commonly known as: TYLENOL      Dose: 650 mg  Take 650 mg by mouth every 6 hours as needed for mild pain  Refills: 0     ARIPiprazole lauroxil  MG/3.2ML intra-muscular  Commonly known as: ARISTADA      Dose: 882 mg  Inject 882 mg into the muscle every 28 days On the 22nd of each month  Refills: 0     bisacodyl 5 MG EC tablet  Commonly known as: DULCOLAX      Dose: 5 mg  Take 1 tablet (5 mg) by mouth daily as needed for constipation  Quantity: 30 tablet  Refills: 0     cloNIDine 0.1 MG tablet  Commonly known as: CATAPRES      Dose: 1 tablet  Take 1 tablet by mouth daily  Refills: 0     diclofenac 1 % topical gel  Commonly known as: VOLTAREN      Dose: 2 g  Apply 2 g topically 4 times daily  Quantity: 350 g  Refills: 0     dicyclomine 20 MG tablet  Commonly known as:  BENTYL      Dose: 20 mg  Take 20 mg by mouth 2 times daily as needed (dyspepsia)  Refills: 0     divalproex sodium extended-release 250 MG 24 hr tablet  Commonly known as: DEPAKOTE ER      Dose: 500 mg  Take 500 mg by mouth daily  Refills: 0     docusate sodium 50 MG capsule  Commonly known as: COLACE      Dose: 100 mg  Take 100 mg by mouth 2 times daily  Refills: 0     doxylamine 25 MG Tabs tablet  Commonly known as: UNISOM      Dose: 25 mg  Take 1 tablet (25 mg) by mouth at bedtime  Quantity: 30 tablet  Refills: 0     famotidine 20 MG tablet  Commonly known as: PEPCID      Dose: 20 mg  Take 20 mg by mouth daily  Refills: 0     FLUoxetine 40 MG capsule  Commonly known as: PROzac      Dose: 80 mg  Take 80 mg by mouth daily  Refills: 0     hydrOXYzine HCl 50 MG tablet  Commonly known as: ATARAX      Dose: 50 mg  Take 50 mg by mouth 2 times daily as needed for anxiety  Refills: 0     levothyroxine 25 MCG tablet  Commonly known as: SYNTHROID/LEVOTHROID      Dose: 25 mcg  Take 1 tablet (25 mcg) by mouth daily for 30 days  Quantity: 30 tablet  Refills: 0     loperamide 2 MG tablet  Commonly known as: IMODIUM A-D      Take 2 tablets (4 mg) in the morning.  Take 1 tablet (2 mg) with every loose stool.  Do not exceed 8 tablets in a day.  Quantity: 30 tablet  Refills: 0     OLANZapine zydis 5 MG ODT  Commonly known as: zyPREXA      Dose: 5 mg  Take 1 tablet (5 mg) by mouth At Bedtime  Quantity: 30 tablet  Refills: 0     ondansetron 4 MG tablet  Commonly known as: Zofran      Dose: 4 mg  Take 1 tablet (4 mg) by mouth every 8 hours as needed  Quantity: 15 tablet  Refills: 0     pantoprazole 40 MG EC tablet  Commonly known as: PROTONIX      Dose: 40 mg  Take 40 mg by mouth daily  Refills: 0     phenazopyridine 100 MG tablet  Commonly known as: PYRIDIUM      Dose: 100 mg  Take 1 tablet (100 mg) by mouth 3 times daily as needed for urinary tract discomfort  Quantity: 6 tablet  Refills: 0     promethazine 12.5 MG tablet  Commonly  known as: PHENERGAN      Dose: 12.5 mg  Take 12.5 mg by mouth every 4 hours  Refills: 0     senna-docusate 8.6-50 MG tablet  Commonly known as: SENOKOT-S/PERICOLACE      Dose: 1 tablet  Take 1 tablet by mouth 2 times daily  Refills: 0     traZODone 50 MG tablet  Commonly known as: DESYREL      Dose: 100 mg  Take 100 mg by mouth At Bedtime  Refills: 0     Vitamin D3 25 mcg (1000 units) tablet  Commonly known as: CHOLECALCIFEROL      Dose: 1,000 Units  Take 1,000 Units by mouth daily  Refills: 0              Past medical history, past surgical history, medications, and allergies were reviewed with the patient. Additional pertinent items: None    Family History   Problem Relation Age of Onset    Diabetes Type 1 Father     Cancer Paternal Grandfather        Social History     Tobacco Use    Smoking status: Former     Current packs/day: 0.25     Average packs/day: 0.3 packs/day for 5.0 years (1.3 ttl pk-yrs)     Types: Cigarettes, Vaping Device    Smokeless tobacco: Former   Substance Use Topics    Alcohol use: No     Social history was reviewed with the patient. Additional pertinent items: None    Allergies   Allergen Reactions    Penicillins Rash and Unknown       Review of Systems  A medically appropriate review of systems was performed with pertinent positives and negatives noted in the HPI, and all other systems negative.    Physical Exam          Physical Exam  Vitals and nursing note reviewed.   HENT:      Head: Atraumatic.   Eyes:      Extraocular Movements: Extraocular movements intact.      Pupils: Pupils are equal, round, and reactive to light.   Pulmonary:      Effort: No respiratory distress.   Musculoskeletal:         General: No deformity.      Cervical back: Neck supple.   Neurological:      General: No focal deficit present.      Mental Status: She is alert and oriented to person, place, and time.   Psychiatric:         Mood and Affect: Mood normal.         ED Course        Procedures         Patient is  stable and safe and will be sent back to her care facility.    Critical care was not performed.     Medical Decision Making  The patient's presentation was of moderate complexity (a chronic illness mild to moderate exacerbation, progression, or side effect of treatment).    The patient's evaluation involved:  history and exam without other MDM data elements    The patient's management necessitated only low risk treatment.      Assessments & Plan (with Medical Decision Making)     I have reviewed the nursing notes.      I have reviewed the findings, diagnosis, plan and need for follow up with the patient.      Final diagnoses:   Borderline personality disorder (H)   Bipolar 1 disorder (H)     Home tonight    Take your medications as directed    Please make an appointment to follow up with Your Primary Care Provider  as needed.      HEATHER CANCHOLA MD    7/18/2024   [facility] EMERGENCY DEPARTMENT       Heather Canchola MD  07/18/24 8002

## 2024-07-20 ENCOUNTER — HOSPITAL ENCOUNTER (EMERGENCY)
Facility: CLINIC | Age: 24
Discharge: HOME | End: 2024-07-20
Attending: FAMILY MEDICINE | Admitting: FAMILY MEDICINE
Payer: MEDICARE

## 2024-07-20 VITALS
HEART RATE: 99 BPM | RESPIRATION RATE: 16 BRPM | OXYGEN SATURATION: 97 % | TEMPERATURE: 97.9 F | DIASTOLIC BLOOD PRESSURE: 100 MMHG | SYSTOLIC BLOOD PRESSURE: 140 MMHG

## 2024-07-20 DIAGNOSIS — F79 INTELLECTUAL DISABILITY: Chronic | ICD-10-CM

## 2024-07-20 DIAGNOSIS — F60.3 BORDERLINE PERSONALITY DISORDER (H): Chronic | ICD-10-CM

## 2024-07-20 DIAGNOSIS — E73.9 LACTOSE INTOLERANCE: ICD-10-CM

## 2024-07-20 PROCEDURE — 99283 EMERGENCY DEPT VISIT LOW MDM: CPT | Performed by: FAMILY MEDICINE

## 2024-07-20 RX ORDER — CHOLECALCIFEROL (VITAMIN D3) 125 MCG
3000 CAPSULE ORAL 3 TIMES DAILY PRN
Qty: 30 TABLET | Refills: 1 | Status: SHIPPED | OUTPATIENT
Start: 2024-07-20

## 2024-07-20 NOTE — ED PROVIDER NOTES
[facility] [place] ([facility])    7/20/24      ED PROVIDER NOTE    History     Chief Complaint   Patient presents with    Nausea     Pt not taking meds for nausea at home, reports to EMS feeling nauseated  today.     HPI  Sadaf Ross is a 23 year old female with a history of intellectual disability, borderline personality disorder, bipolar 1 disorder, and an ED care plan in place for frequent ED visits who presents via EMS from  for evaluation of nausea.  Patient notes that she has been having intermittent nausea for several weeks now and is feeling as though this may be associated with dairy products she has never tried any Lactaid.  Patient does have some chronic suicidal ideation but denies any intent and is stating that she can be safe at her group home.    Past Medical History  Past Medical History:   Diagnosis Date    ADHD (attention deficit hyperactivity disorder)     Bipolar 1 disorder (H)     Borderline personality disorder (H)     Depressive disorder     Intellectual disability     Obesity     Syncope      Past Surgical History:   Procedure Laterality Date    APPENDECTOMY       lactase (LACTAID) 3000 UNIT tablet  acetaminophen (TYLENOL) 325 MG tablet  ARIPiprazole lauroxil ER (ARISTADA) 882 MG/3.2ML intra-muscular  bisacodyl (DULCOLAX) 5 MG EC tablet  cloNIDine (CATAPRES) 0.1 MG tablet  diclofenac (VOLTAREN) 1 % topical gel  dicyclomine (BENTYL) 20 MG tablet  divalproex sodium extended-release (DEPAKOTE ER) 250 MG 24 hr tablet  docusate sodium (COLACE) 50 MG capsule  doxylamine (UNISOM) 25 MG TABS tablet  famotidine (PEPCID) 20 MG tablet  FLUoxetine (PROZAC) 40 MG capsule  hydrOXYzine (ATARAX) 50 MG tablet  levothyroxine (SYNTHROID/LEVOTHROID) 25 MCG tablet  loperamide (IMODIUM A-D) 2 MG tablet  OLANZapine zydis (ZYPREXA) 5 MG ODT  ondansetron (ZOFRAN) 4 MG tablet  pantoprazole (PROTONIX) 40 MG EC tablet  phenazopyridine (PYRIDIUM) 100 MG tablet  promethazine (PHENERGAN) 12.5  MG tablet  senna-docusate (SENOKOT-S/PERICOLACE) 8.6-50 MG tablet  traZODone (DESYREL) 50 MG tablet  Vitamin D, Cholecalciferol, 25 MCG (1000 UT) TABS      Allergies   Allergen Reactions    Penicillins Rash and Unknown     Family History  Family History   Problem Relation Age of Onset    Diabetes Type 1 Father     Cancer Paternal Grandfather      Social History   Social History     Tobacco Use    Smoking status: Former     Current packs/day: 0.25     Average packs/day: 0.3 packs/day for 5.0 years (1.3 ttl pk-yrs)     Types: Cigarettes, Vaping Device    Smokeless tobacco: Former   Substance Use Topics    Alcohol use: No    Drug use: No      Past medical history, past surgical history, medications, allergies, family history, and social history were reviewed with the patient. No additional pertinent items.     A medically appropriate review of systems was performed with pertinent positives and negatives noted in the HPI, and all other systems negative.    Physical Exam   BP: (!) 140/100  Pulse: 99  Temp: 97.9  F (36.6  C)  Resp: 16  SpO2: 97 %  Physical Exam  Vitals and nursing note reviewed.   Constitutional:       General: She is not in acute distress.     Appearance: Normal appearance. She is not diaphoretic.   HENT:      Head: Atraumatic.      Mouth/Throat:      Mouth: Mucous membranes are moist.   Eyes:      General: No scleral icterus.     Conjunctiva/sclera: Conjunctivae normal.   Cardiovascular:      Rate and Rhythm: Normal rate.      Heart sounds: Normal heart sounds.   Pulmonary:      Effort: No respiratory distress.      Breath sounds: Normal breath sounds.   Abdominal:      General: Abdomen is flat.   Musculoskeletal:      Cervical back: Neck supple.   Skin:     General: Skin is warm.      Findings: No rash.   Neurological:      General: No focal deficit present.      Mental Status: She is alert and oriented to person, place, and time.      Sensory: No sensory deficit.      Motor: No weakness.       Coordination: Coordination normal.   Psychiatric:         Mood and Affect: Mood is anxious.         Speech: Speech normal.         Behavior: Behavior is cooperative.         Thought Content: Thought content does not include homicidal or suicidal ideation.           ED Course, Procedures, & Data      Procedures       No results found for any visits on 07/20/24.  Medications - No data to display  Labs Ordered and Resulted from Time of ED Arrival to Time of ED Departure - No data to display  No orders to display          Critical care was not performed.     Medical Decision Making  The patient's presentation was of moderate complexity (an acute illness with systemic symptoms).    The patient's evaluation involved:  history and exam without other MDM data elements    The patient's management necessitated moderate risk (prescription drug management including medications given in the ED).    Assessment & Plan        I have reviewed the nursing notes. I have reviewed the findings, diagnosis, plan and need for follow up with the patient.    Discharge Medication List as of 7/20/2024  6:40 PM        START taking these medications    Details   lactase (LACTAID) 3000 UNIT tablet Take 1 tablet (3,000 Units) by mouth 3 times daily as needed for indigestion, Disp-30 tablet, R-1, E-Prescribe             Final diagnoses:   Intellectual disability   Borderline personality disorder (H)   Lactose intolerance         [facility] EMERGENCY DEPARTMENT  7/20/2024     Robert Olea MD  07/22/24 1348

## 2024-07-20 NOTE — ED PROVIDER NOTES
ED Provider Note  [facility]      History     Chief Complaint   Patient presents with    traumatized      BIBA, reports pt stating she is traumatized because PD showed up and threatened to arrest her for her behavior.  VSS.     HPI  Sadaf Ross is a 23 year old female with a history of intellectual disability, borderline personality disorder, bipolar 1 disorder, with frequent ED visits who presents to the ER for evaluation of anxiety. Patient had come to the ED earlier today, but left without being seen. She returns tonight, stating that her mental health is not good, but she is not suicidal. She feels she can go home. She feels forced to come here at the suggestion of police who threatened to correction her if she does not go.    Past Medical History  Past Medical History:   Diagnosis Date    ADHD (attention deficit hyperactivity disorder)     Bipolar 1 disorder (H)     Borderline personality disorder (H)     Depressive disorder     Intellectual disability     Obesity     Syncope      Past Surgical History:   Procedure Laterality Date    APPENDECTOMY       cloNIDine (CATAPRES) 0.1 MG tablet  dicyclomine (BENTYL) 20 MG tablet  divalproex sodium extended-release (DEPAKOTE ER) 250 MG 24 hr tablet  famotidine (PEPCID) 20 MG tablet  FLUoxetine (PROZAC) 40 MG capsule  hydrOXYzine (ATARAX) 50 MG tablet  levothyroxine (SYNTHROID/LEVOTHROID) 25 MCG tablet  OLANZapine zydis (ZYPREXA) 5 MG ODT  promethazine (PHENERGAN) 12.5 MG tablet  traZODone (DESYREL) 50 MG tablet  Vitamin D, Cholecalciferol, 25 MCG (1000 UT) TABS  acetaminophen (TYLENOL) 325 MG tablet  ARIPiprazole lauroxil ER (ARISTADA) 882 MG/3.2ML intra-muscular  bisacodyl (DULCOLAX) 5 MG EC tablet  diclofenac (VOLTAREN) 1 % topical gel  docusate sodium (COLACE) 50 MG capsule  doxylamine (UNISOM) 25 MG TABS tablet  loperamide (IMODIUM A-D) 2 MG tablet  ondansetron (ZOFRAN) 4 MG tablet  pantoprazole (PROTONIX) 40 MG EC tablet  phenazopyridine  (PYRIDIUM) 100 MG tablet  senna-docusate (SENOKOT-S/PERICOLACE) 8.6-50 MG tablet      Allergies   Allergen Reactions    Penicillins Rash and Unknown     Family History  Family History   Problem Relation Age of Onset    Diabetes Type 1 Father     Cancer Paternal Grandfather      Social History   Social History     Tobacco Use    Smoking status: Former     Current packs/day: 0.25     Average packs/day: 0.3 packs/day for 5.0 years (1.3 ttl pk-yrs)     Types: Cigarettes, Vaping Device    Smokeless tobacco: Former   Substance Use Topics    Alcohol use: No    Drug use: No      A medically appropriate review of systems was performed with pertinent positives and negatives noted in the HPI, and all other systems negative.    Physical Exam   BP: (!) 160/98  Pulse: 103  Temp: 98.2  F (36.8  C)  Resp: 16  SpO2: 99 %  Physical Exam  Vitals and nursing note reviewed.   HENT:      Head: Normocephalic.   Eyes:      Pupils: Pupils are equal, round, and reactive to light.   Pulmonary:      Effort: Pulmonary effort is normal.   Musculoskeletal:         General: Normal range of motion.      Cervical back: Normal range of motion.   Neurological:      General: No focal deficit present.      Mental Status: She is alert.   Psychiatric:         Attention and Perception: Attention and perception normal.         Mood and Affect: Mood and affect normal.         Speech: Speech normal.         Behavior: Behavior normal. Behavior is not agitated, aggressive, hyperactive or combative. Behavior is cooperative.         Thought Content: Thought content normal. Thought content is not paranoid or delusional. Thought content does not include homicidal or suicidal ideation.         Cognition and Memory: Cognition and memory normal.         Judgment: Judgment normal.           ED Course, Procedures, & Data             10 minutes spent reviewing prior records and intervention.     No results found for any visits on 07/19/24.  Medications - No data to  display  Labs Ordered and Resulted from Time of ED Arrival to Time of ED Departure - No data to display  No orders to display          Critical care was not performed.     Medical Decision Making  The patient's presentation was of moderate complexity (a chronic illness mild to moderate exacerbation, progression, or side effect of treatment).    The patient's evaluation involved:  review of external note(s) from 2 sources (see separate area of note for details)  review of 2 test result(s) ordered prior to this encounter (see separate area of note for details)    The patient's management necessitated moderate risk (limitations due to social determinants of health (healthcare access difficulty and social isolation)).    Assessment & Plan    Patient is here reporting that she is distressed, triggered by police threatening her with alf if she continues to call them. She appears at baseline here, denying any suicidal thoughts and would like to go home. She declined a dose of Zyprexa or hydroxyzine. Patient can be discharged. There is no acute safety concern requiring keeping her for further monitoring in the ED. She can be discharged.    I have reviewed the nursing notes. I have reviewed the findings, diagnosis, plan and need for follow up with the patient.    Discharge Medication List as of 7/19/2024  7:55 PM          Final diagnoses:   Acute reaction to situational stress       Magno Sena MD  [facility] EMERGENCY DEPARTMENT  7/19/2024     Magno Sena MD  07/19/24 2004

## 2024-07-23 ENCOUNTER — HOSPITAL ENCOUNTER (EMERGENCY)
Facility: CLINIC | Age: 24
Discharge: HOME | End: 2024-07-23
Attending: PSYCHIATRY & NEUROLOGY | Admitting: PSYCHIATRY & NEUROLOGY
Payer: MEDICARE

## 2024-07-23 ENCOUNTER — HOSPITAL ENCOUNTER (EMERGENCY)
Facility: CLINIC | Age: 24
Discharge: HOME | End: 2024-07-23
Payer: MEDICARE

## 2024-07-23 VITALS
RESPIRATION RATE: 20 BRPM | HEART RATE: 96 BPM | DIASTOLIC BLOOD PRESSURE: 78 MMHG | OXYGEN SATURATION: 98 % | TEMPERATURE: 97.8 F | SYSTOLIC BLOOD PRESSURE: 109 MMHG

## 2024-07-23 DIAGNOSIS — F79 INTELLECTUAL DISABILITY: Chronic | ICD-10-CM

## 2024-07-23 DIAGNOSIS — F31.32 BIPOLAR AFFECTIVE DISORDER, CURRENTLY DEPRESSED, MODERATE (H): ICD-10-CM

## 2024-07-23 DIAGNOSIS — E73.9 LACTOSE INTOLERANCE: ICD-10-CM

## 2024-07-23 DIAGNOSIS — F60.3 BORDERLINE PERSONALITY DISORDER (H): Chronic | ICD-10-CM

## 2024-07-23 PROCEDURE — 99283 EMERGENCY DEPT VISIT LOW MDM: CPT | Performed by: FAMILY MEDICINE

## 2024-07-23 RX ORDER — CHOLECALCIFEROL (VITAMIN D3) 125 MCG
3000 CAPSULE ORAL
Qty: 30 TABLET | Refills: 0 | Status: SHIPPED | OUTPATIENT
Start: 2024-07-23

## 2024-07-23 ASSESSMENT — COLUMBIA-SUICIDE SEVERITY RATING SCALE - C-SSRS
2. HAVE YOU ACTUALLY HAD ANY THOUGHTS OF KILLING YOURSELF IN THE PAST MONTH?: NO
1. IN THE PAST MONTH, HAVE YOU WISHED YOU WERE DEAD OR WISHED YOU COULD GO TO SLEEP AND NOT WAKE UP?: NO
6. HAVE YOU EVER DONE ANYTHING, STARTED TO DO ANYTHING, OR PREPARED TO DO ANYTHING TO END YOUR LIFE?: YES

## 2024-07-23 NOTE — DISCHARGE INSTRUCTIONS
Discharged from the emergency room up to pharmacy to fill the Lactaid prescription as discussed and follow-up with your outpatient providers this week.

## 2024-07-23 NOTE — Clinical Note
Pt dropped off by ambulance in triage, MD spoke with pt. Pt left before triage. Pt is okay to leave per MD.

## 2024-07-23 NOTE — ED TRIAGE NOTES
Anxiety, vomiting x 2 days     Triage Assessment (Adult)       Row Name 07/23/24 1616          Triage Assessment    Airway WDL WDL        Respiratory WDL    Respiratory WDL WDL        Skin Circulation/Temperature WDL    Skin Circulation/Temperature WDL WDL        Cardiac WDL    Cardiac WDL WDL        Peripheral/Neurovascular WDL    Peripheral Neurovascular WDL WDL        Cognitive/Neuro/Behavioral WDL    Cognitive/Neuro/Behavioral WDL WDL

## 2024-07-24 ENCOUNTER — HOSPITAL ENCOUNTER (EMERGENCY)
Facility: HOSPITAL | Age: 24
Discharge: HOME | End: 2024-07-24
Attending: EMERGENCY MEDICINE | Admitting: EMERGENCY MEDICINE
Payer: MEDICARE

## 2024-07-24 VITALS
RESPIRATION RATE: 16 BRPM | TEMPERATURE: 98.5 F | DIASTOLIC BLOOD PRESSURE: 72 MMHG | OXYGEN SATURATION: 99 % | BODY MASS INDEX: 43.94 KG/M2 | HEART RATE: 88 BPM | SYSTOLIC BLOOD PRESSURE: 122 MMHG | WEIGHT: 248 LBS | HEIGHT: 63 IN

## 2024-07-24 DIAGNOSIS — R23.8 SKIN IRRITATION: ICD-10-CM

## 2024-07-24 DIAGNOSIS — R09.81 STUFFY NOSE: ICD-10-CM

## 2024-07-24 PROCEDURE — 99282 EMERGENCY DEPT VISIT SF MDM: CPT

## 2024-07-24 RX ORDER — GINSENG 100 MG
CAPSULE ORAL ONCE
Status: DISCONTINUED | OUTPATIENT
Start: 2024-07-24 | End: 2024-07-24 | Stop reason: HOSPADM

## 2024-07-24 ASSESSMENT — ACTIVITIES OF DAILY LIVING (ADL): ADLS_ACUITY_SCORE: 37

## 2024-07-24 NOTE — ED PROVIDER NOTES
"EMERGENCY DEPARTMENT ENCOUNTER      NAME: Sadaf Ross  AGE: 23 year old female  YOB: 2000  MRN: [id number]  EVALUATION DATE & TIME: No admission date for patient encounter.    PCP: [facility]    ED PROVIDER: Elisabeth Kwok M.D.      Chief Complaint   Patient presents with    Abdominal Pain         FINAL IMPRESSION:  1. Skin irritation    2. Stuffy nose          ED COURSE & MEDICAL DECISION MAKING:    ED Course as of 07/24/24 1732 Wed Jul 24, 2024   1011 Patient presents to the ED with frequent visits, says she put an unknown \"itching\" cream in her belly button because it was \"sweaty\" and then it became slightly irritated. On examination, no warmth or induration and with some excorciations noted in her belly button area, patient instructed no further creams to site. She also c/o cough and stuffy nose for 1-2 days, no cough on examination and HPI and no coryza noted, oropharynx WNL and patient without fevers and well appearing, declined viral PCR testing and then walked out of the ER after I left the room and prior to bacitracin therapy. Patient discharged after being provided with extensive anticipatory guidance and given return precautions, importance of PMD follow-up emphasized.        10:05 AM I met with the patient, obtained history, performed an initial exam, and discussed options and plan for diagnostics and treatment here in the ED.   10:11 PM I decided the patient is ready for discharge.  10:14 PM The patient was discharged.    Pertinent Labs & Imaging studies reviewed. (See chart for details)      At the conclusion of the encounter I discussed the results of all of the tests and the disposition. The questions were answered. The patient or family acknowledged understanding and was agreeable with the care plan.     MEDICATIONS GIVEN IN THE EMERGENCY:  Medications - No data to display    NEW PRESCRIPTIONS STARTED AT TODAY'S ER VISIT  Discharge Medication List as of 7/24/2024 10:14 AM    " "         =================================================================    [facility]      Sadaf Ross is a 23 year old female with PMHx of borderline personality disorder with frequent ED use and last visit yesterday for \"lactose intolerance\" and with 25 ER visits this month (it is the 24th of the month currently) who presents to the ED today via walking with abdominal pain.    Per patient, she has been coughing up a lot of phlegm for the past two days. This has caused her to vomit multiple times due to coughing so hard. She also has had some chills and noticed a slight rash around her naval which she said burned after she put some ointment on it.     She was unsure of the name of the ointment she put on her naval. Besides the ointment she did not mention taking/using anything to address her symptoms. No daily medications were mentioned.      REVIEW OF SYSTEMS   All other systems reviewed and are negative except as noted above in HPI.    PAST MEDICAL HISTORY:  Past Medical History:   Diagnosis Date    ADHD (attention deficit hyperactivity disorder)     Bipolar 1 disorder (H)     Borderline personality disorder (H)     Depressive disorder     Intellectual disability     Obesity     Syncope        PAST SURGICAL HISTORY:  Past Surgical History:   Procedure Laterality Date    APPENDECTOMY         CURRENT MEDICATIONS:    acetaminophen (TYLENOL) 325 MG tablet  ARIPiprazole lauroxil ER (ARISTADA) 882 MG/3.2ML intra-muscular  bisacodyl (DULCOLAX) 5 MG EC tablet  cloNIDine (CATAPRES) 0.1 MG tablet  diclofenac (VOLTAREN) 1 % topical gel  dicyclomine (BENTYL) 20 MG tablet  divalproex sodium extended-release (DEPAKOTE ER) 250 MG 24 hr tablet  docusate sodium (COLACE) 50 MG capsule  doxylamine (UNISOM) 25 MG TABS tablet  famotidine (PEPCID) 20 MG tablet  FLUoxetine (PROZAC) 40 MG capsule  hydrOXYzine (ATARAX) 50 MG tablet  lactase (LACTAID) 3000 UNIT tablet  lactase (LACTAID) 3000 UNIT tablet  levothyroxine " (SYNTHROID/LEVOTHROID) 25 MCG tablet  loperamide (IMODIUM A-D) 2 MG tablet  OLANZapine zydis (ZYPREXA) 5 MG ODT  ondansetron (ZOFRAN) 4 MG tablet  pantoprazole (PROTONIX) 40 MG EC tablet  phenazopyridine (PYRIDIUM) 100 MG tablet  promethazine (PHENERGAN) 12.5 MG tablet  senna-docusate (SENOKOT-S/PERICOLACE) 8.6-50 MG tablet  traZODone (DESYREL) 50 MG tablet  Vitamin D, Cholecalciferol, 25 MCG (1000 UT) TABS        ALLERGIES:  Allergies   Allergen Reactions    Penicillins Rash and Unknown       FAMILY HISTORY:  Family History   Problem Relation Age of Onset    Diabetes Type 1 Father     Cancer Paternal Grandfather        SOCIAL HISTORY:   Social History     Socioeconomic History    Marital status: Single     Spouse name: None    Number of children: None    Years of education: None    Highest education level: None   Tobacco Use    Smoking status: Former     Current packs/day: 0.25     Average packs/day: 0.3 packs/day for 5.0 years (1.3 ttl pk-yrs)     Types: Cigarettes, Vaping Device    Smokeless tobacco: Former   Substance and Sexual Activity    Alcohol use: No    Drug use: No    Sexual activity: Not Currently     Partners: Female, Male     Birth control/protection: Injection     Social Determinants of Health     Financial Resource Strain: Not on File (1/20/2021)    Received from [facility]    Financial Resource Strain     Financial Resource Strain: 0   Recent Concern: Financial Resource Strain - At Risk (1/20/2021)    Received from [facility]    Financial Resource Strain     Financial Resource Strain: 2   Food Insecurity: Not on File (1/20/2021)    Received from [facility]    Food Insecurity     Food: 0   Recent Concern: Food Insecurity - At Risk (1/20/2021)    Received from [facility]    Food Insecurity     Food: 2   Transportation Needs: Not on File (1/20/2021)    Received from [facility]    Transportation Needs     Transportation: 0   Recent Concern: Transportation Needs - At Risk (1/20/2021)    Received from [facility]  "   Transportation Needs     Transportation: 2   Physical Activity: Not on File (2020)    Received from [facility]    Physical Activity     Physical Activity: 0   Stress: Not on File (2021)    Received from [facility]    Stress     Stress: 0   Social Connections: Not at Risk (2021)    Received from [facility]    Social Connections     Social Connections and Isolation: 1   Interpersonal Safety: Not At Risk (2024)    Received from [facility]     Humiliation, Afraid, Rape, and Kick questionnaire     Fear of Current or Ex-Partner: No     Emotionally Abused: No     Physically Abused: No     Sexually Abused: No   Housing Stability: Not on File (2021)    Received from [facility]    Housing Stability     Housin       VITALS:  Patient Vitals for the past 24 hrs:   BP Temp Temp src Pulse Resp SpO2 Height Weight   24 0954 122/72 98.5  F (36.9  C) Oral 88 16 99 % 1.6 m (5' 3\") 112.5 kg (248 lb)       PHYSICAL EXAM    GENERAL: Awake, alert.  In no acute distress.   HEENT: Normocephalic, atraumatic.  Pupils equal, round and reactive.  Conjunctiva normal.  EOMI.  NECK: No stridor or apparent deformity.  PULMONARY: Symmetrical breath sounds without distress.  Lungs clear to auscultation bilaterally without wheezes, rhonchi or rales.  CARDIO: Regular rate and rhythm.  No significant murmur, rub or gallop.  Radial pulses strong and symmetrical.  ABDOMINAL: Abdomen soft, non-distended and non-tender to palpation.  No CVAT, no palpable hepatosplenomegaly.  EXTREMITIES: No lower extremity swelling or edema.    NEURO: Alert and oriented to person, place and time.  Cranial nerves grossly intact.  No focal motor deficit.  PSYCH: Normal mood and affect  SKIN: No rashes, Slight redness without warmth or induration to the naval      Willie MCGINNIS, am serving as a scribe to document services personally performed by Dr. Elisabeth Kwok based on my observation and the provider's statements to me. Elisabeth MCGINNIS " MD Sundar attest that Willie Tran is acting in a scribe capacity, has observed my performance of the services and has documented them in accordance with my direction.       Elisabeth Kwok MD  07/24/24 5124

## 2024-07-24 NOTE — ED TRIAGE NOTES
Pt reports low abd pain , N/V/D , chills, cough off and on x 2 weeks. Pt lives in  no staff with her.     Triage Assessment (Adult)       Row Name 07/24/24 0955          Triage Assessment    Airway WDL WDL        Respiratory WDL    Respiratory WDL WDL        Skin Circulation/Temperature WDL    Skin Circulation/Temperature WDL WDL        Cardiac WDL    Cardiac WDL WDL        Peripheral/Neurovascular WDL    Peripheral Neurovascular WDL WDL

## 2024-07-24 NOTE — ED NOTES
Pt left without paperwork or discharge vs or nurse discharge instructions or nurse assessment. Provider made aware.

## 2024-07-26 ENCOUNTER — HOSPITAL ENCOUNTER (EMERGENCY)
Facility: CLINIC | Age: 24
Discharge: HOME | End: 2024-07-26
Attending: EMERGENCY MEDICINE | Admitting: EMERGENCY MEDICINE
Payer: MEDICARE

## 2024-07-26 ENCOUNTER — HOSPITAL ENCOUNTER (EMERGENCY)
Facility: CLINIC | Age: 24
Discharge: HOME | End: 2024-07-26
Payer: MEDICARE

## 2024-07-26 VITALS
DIASTOLIC BLOOD PRESSURE: 91 MMHG | RESPIRATION RATE: 16 BRPM | SYSTOLIC BLOOD PRESSURE: 146 MMHG | TEMPERATURE: 98.4 F | OXYGEN SATURATION: 98 % | HEART RATE: 104 BPM

## 2024-07-26 DIAGNOSIS — Z76.89 FREQUENT PATIENT IN EMERGENCY DEPARTMENT: ICD-10-CM

## 2024-07-26 DIAGNOSIS — F60.3 BORDERLINE PERSONALITY DISORDER (H): ICD-10-CM

## 2024-07-26 PROCEDURE — 99284 EMERGENCY DEPT VISIT MOD MDM: CPT | Performed by: EMERGENCY MEDICINE

## 2024-07-26 PROCEDURE — 99283 EMERGENCY DEPT VISIT LOW MDM: CPT | Performed by: EMERGENCY MEDICINE

## 2024-07-26 ASSESSMENT — COLUMBIA-SUICIDE SEVERITY RATING SCALE - C-SSRS
2. HAVE YOU ACTUALLY HAD ANY THOUGHTS OF KILLING YOURSELF IN THE PAST MONTH?: YES
5. HAVE YOU STARTED TO WORK OUT OR WORKED OUT THE DETAILS OF HOW TO KILL YOURSELF? DO YOU INTEND TO CARRY OUT THIS PLAN?: YES
3. HAVE YOU BEEN THINKING ABOUT HOW YOU MIGHT KILL YOURSELF?: YES
4. HAVE YOU HAD THESE THOUGHTS AND HAD SOME INTENTION OF ACTING ON THEM?: NO
1. IN THE PAST MONTH, HAVE YOU WISHED YOU WERE DEAD OR WISHED YOU COULD GO TO SLEEP AND NOT WAKE UP?: YES
6. HAVE YOU EVER DONE ANYTHING, STARTED TO DO ANYTHING, OR PREPARED TO DO ANYTHING TO END YOUR LIFE?: YES

## 2024-07-26 NOTE — ED PROVIDER NOTES
ED Provider Note  [facility]      History     Chief Complaint   Patient presents with    Anxiety     Anxiety, vomiting x 2 days     HPI  Sadaf Ross is a 23 year old female who return to the emergency room approximately 1 hour after recently being seen at this time she is going to be receiving her prescription for Lactaid as well as Zofran patient understands and will be discharging back to group home    Past Medical History  Past Medical History:   Diagnosis Date    ADHD (attention deficit hyperactivity disorder)     Bipolar 1 disorder (H)     Borderline personality disorder (H)     Depressive disorder     Intellectual disability     Obesity     Syncope      Past Surgical History:   Procedure Laterality Date    APPENDECTOMY       lactase (LACTAID) 3000 UNIT tablet  acetaminophen (TYLENOL) 325 MG tablet  ARIPiprazole lauroxil ER (ARISTADA) 882 MG/3.2ML intra-muscular  bisacodyl (DULCOLAX) 5 MG EC tablet  cloNIDine (CATAPRES) 0.1 MG tablet  diclofenac (VOLTAREN) 1 % topical gel  dicyclomine (BENTYL) 20 MG tablet  divalproex sodium extended-release (DEPAKOTE ER) 250 MG 24 hr tablet  docusate sodium (COLACE) 50 MG capsule  doxylamine (UNISOM) 25 MG TABS tablet  famotidine (PEPCID) 20 MG tablet  FLUoxetine (PROZAC) 40 MG capsule  hydrOXYzine (ATARAX) 50 MG tablet  lactase (LACTAID) 3000 UNIT tablet  levothyroxine (SYNTHROID/LEVOTHROID) 25 MCG tablet  loperamide (IMODIUM A-D) 2 MG tablet  OLANZapine zydis (ZYPREXA) 5 MG ODT  ondansetron (ZOFRAN) 4 MG tablet  pantoprazole (PROTONIX) 40 MG EC tablet  phenazopyridine (PYRIDIUM) 100 MG tablet  promethazine (PHENERGAN) 12.5 MG tablet  senna-docusate (SENOKOT-S/PERICOLACE) 8.6-50 MG tablet  traZODone (DESYREL) 50 MG tablet  Vitamin D, Cholecalciferol, 25 MCG (1000 UT) TABS      Allergies   Allergen Reactions    Penicillins Rash and Unknown     Family History  Family History   Problem Relation Age of Onset    Diabetes Type 1 Father     Cancer  Paternal Grandfather      Social History   Social History     Tobacco Use    Smoking status: Former     Current packs/day: 0.25     Average packs/day: 0.3 packs/day for 5.0 years (1.3 ttl pk-yrs)     Types: Cigarettes, Vaping Device    Smokeless tobacco: Former   Substance Use Topics    Alcohol use: No    Drug use: No      A medically appropriate review of systems was performed with pertinent positives and negatives noted in the HPI, and all other systems negative.    Physical Exam   BP: 109/78  Pulse: 96  Temp: 97.8  F (36.6  C)  Resp: 20  SpO2: 98 %  Physical Exam  Constitutional:       General: She is not in acute distress.     Appearance: Normal appearance. She is not diaphoretic.   HENT:      Head: Atraumatic.      Mouth/Throat:      Mouth: Mucous membranes are moist.   Eyes:      General: No scleral icterus.     Conjunctiva/sclera: Conjunctivae normal.   Cardiovascular:      Rate and Rhythm: Normal rate.      Heart sounds: Normal heart sounds.   Pulmonary:      Effort: No respiratory distress.      Breath sounds: Normal breath sounds.   Abdominal:      General: Abdomen is flat.   Musculoskeletal:      Cervical back: Neck supple.   Skin:     General: Skin is warm.      Findings: No rash.   Neurological:      General: No focal deficit present.      Mental Status: She is alert and oriented to person, place, and time.      Sensory: No sensory deficit.      Coordination: Coordination normal.   Psychiatric:         Mood and Affect: Mood is anxious.         Thought Content: Thought content does not include homicidal or suicidal ideation.           ED Course, Procedures, & Data      Procedures         No results found for any visits on 07/23/24.  Medications - No data to display  Labs Ordered and Resulted from Time of ED Arrival to Time of ED Departure - No data to display  No orders to display          Critical care was not performed.     Medical Decision Making  The patient's presentation was of low complexity (a  stable chronic illness).    The patient's evaluation involved:  history and exam without other MDM data elements    The patient's management necessitated only low risk treatment.    Assessment & Plan        I have reviewed the nursing notes. I have reviewed the findings, diagnosis, plan and need for follow up with the patient.    Discharge Medication List as of 7/23/2024  4:20 PM          Final diagnoses:   Lactose intolerance   Borderline personality disorder (H)   Intellectual disability   Bipolar affective disorder, currently depressed, moderate (H)         [facility] EMERGENCY DEPARTMENT  7/23/2024     Robert Olea MD  07/25/24 5371

## 2024-07-27 NOTE — ED TRIAGE NOTES
Triage Assessment (Adult)       Row Name 07/26/24 5399          Triage Assessment    Airway WDL WDL        Respiratory WDL    Respiratory WDL WDL        Skin Circulation/Temperature WDL    Skin Circulation/Temperature WDL WDL        Cardiac WDL    Cardiac WDL WDL        Peripheral/Neurovascular WDL    Peripheral Neurovascular WDL WDL        Cognitive/Neuro/Behavioral WDL    Cognitive/Neuro/Behavioral WDL WDL

## 2024-07-27 NOTE — DISCHARGE INSTRUCTIONS
You have been seen in the emergency department today.  You need to follow-up with your psychiatrist and therapist.  The emergency department is not the appropriate place to go when you have your chronic symptoms.  We cannot help you with your chronic symptoms.  We have offered to give you Zyprexa which initially you were agreeable to, but then you decided not to take it.    Please follow-up with your established care providers.

## 2024-07-27 NOTE — ED PROVIDER NOTES
"    [facility] EMERGENCY DEPARTMENT ([facility])    7/26/24      ED PROVIDER NOTE    History     Chief Complaint   Patient presents with    Anxiety     Biba, woke up and feeling overwhelmed so called 911. Pt states she feels like she \"woke up traumatized\". States she is having SI, plan to cut self.      Suicidal     HPI    Sadaf Ross is a 23 year old female who is extraordinarily well-known to the emergency department for many, many, many very frequent visits, with a previous history of borderline personality disorder, intellectual disability, bipolar affective disorder, chronic SI, who resides in a group home, who presents to the ED today via EMS because she was feeling overwhelmed.  It is noteworthy that the that this is the patient's 31st ED visit in the past month.  She was in the emergency department earlier today and was actually at her therapy session this afternoon.  Patient states that she was sleeping, and she woke up and was \"traumatized \".  She does not say what she was traumatized by.  She felt overwhelmed and did not know what to do.  She did not take any of her as needed medications, in fact she states she is not taking any of her medications anymore because \"they do not work so I stopped them 3 days ago\".  Patient states that she feels so traumatized that she does not know what to do and that is why she is here again today.  She admits to chronic SI with plan to cut herself, but immediately qualifies this by stating she does not have anything to harm herself with.  She has not attempted to harm herself today.  No HI, no psychosis.  Patient states that she does not like her current psychiatrist and wants to switch her psychiatrist, but \"they will not let me because I am switching too much \".    Patient denies any auditory hallucinations.        Past Medical History  Past Medical History:   Diagnosis Date    ADHD (attention deficit hyperactivity disorder)     Bipolar 1 disorder (H)     " Borderline personality disorder (H)     Depressive disorder     Intellectual disability     Obesity     Syncope      Past Surgical History:   Procedure Laterality Date    APPENDECTOMY       acetaminophen (TYLENOL) 325 MG tablet  ARIPiprazole lauroxil ER (ARISTADA) 882 MG/3.2ML intra-muscular  bisacodyl (DULCOLAX) 5 MG EC tablet  cloNIDine (CATAPRES) 0.1 MG tablet  diclofenac (VOLTAREN) 1 % topical gel  dicyclomine (BENTYL) 20 MG tablet  divalproex sodium extended-release (DEPAKOTE ER) 250 MG 24 hr tablet  docusate sodium (COLACE) 50 MG capsule  doxylamine (UNISOM) 25 MG TABS tablet  famotidine (PEPCID) 20 MG tablet  FLUoxetine (PROZAC) 40 MG capsule  hydrOXYzine (ATARAX) 50 MG tablet  lactase (LACTAID) 3000 UNIT tablet  lactase (LACTAID) 3000 UNIT tablet  levothyroxine (SYNTHROID/LEVOTHROID) 25 MCG tablet  loperamide (IMODIUM A-D) 2 MG tablet  OLANZapine zydis (ZYPREXA) 5 MG ODT  ondansetron (ZOFRAN) 4 MG tablet  pantoprazole (PROTONIX) 40 MG EC tablet  phenazopyridine (PYRIDIUM) 100 MG tablet  promethazine (PHENERGAN) 12.5 MG tablet  senna-docusate (SENOKOT-S/PERICOLACE) 8.6-50 MG tablet  traZODone (DESYREL) 50 MG tablet  Vitamin D, Cholecalciferol, 25 MCG (1000 UT) TABS      Allergies   Allergen Reactions    Penicillins Rash and Unknown     Family History  Family History   Problem Relation Age of Onset    Diabetes Type 1 Father     Cancer Paternal Grandfather      Social History   Social History     Tobacco Use    Smoking status: Former     Current packs/day: 0.25     Average packs/day: 0.3 packs/day for 5.0 years (1.3 ttl pk-yrs)     Types: Cigarettes, Vaping Device    Smokeless tobacco: Former   Substance Use Topics    Alcohol use: No    Drug use: No      A complete review of systems was performed with pertinent positives and negatives noted in the HPI, and all other systems negative.    Physical Exam   BP: (!) 146/91  Pulse: 104  Temp: 98.4  F (36.9  C)  Resp: 16  SpO2: 98 %  Physical Exam  Vitals and nursing  "note reviewed.   Constitutional:       General: She is not in acute distress.     Appearance: She is not diaphoretic.      Comments: Adult female, lying back in bed, no acute distress   HENT:      Head: Atraumatic.      Mouth/Throat:      Mouth: Mucous membranes are moist.      Pharynx: Oropharynx is clear. No oropharyngeal exudate.   Eyes:      General: No scleral icterus.     Pupils: Pupils are equal, round, and reactive to light.   Cardiovascular:      Rate and Rhythm: Normal rate.      Pulses: Normal pulses.      Heart sounds: Normal heart sounds. No murmur heard.  Pulmonary:      Effort: No respiratory distress.      Breath sounds: Normal breath sounds.   Abdominal:      General: Bowel sounds are normal.      Palpations: Abdomen is soft.      Tenderness: There is no abdominal tenderness.   Musculoskeletal:         General: No tenderness.   Skin:     General: Skin is warm.      Findings: No rash.   Neurological:      Mental Status: She is alert.   Psychiatric:      Comments: Unremarkable appearance.  Perseverating over being \"traumatized\".  Does not appear to be overly upset or agitated.  Calm and cooperative.  Chronic SI with plan to cut self (does not have anything to harm herself with at present).  No HI, no psychosis, Insight nonexistent.         ED Course, Procedures, & Data      Procedures               No results found for any visits on 07/26/24.  Medications - No data to display  Labs Ordered and Resulted from Time of ED Arrival to Time of ED Departure - No data to display  No orders to display          Critical care was not performed.     Medical Decision Making  The patient's presentation was of high complexity (a chronic illness severe exacerbation, progression, or side effect of treatment).    The patient's evaluation involved:  review of external note(s) from 3+ sources (see separate area of note for details)    The patient's management necessitated only low risk treatment.    Assessment & Plan  " "  Patient presents the above complaints.  On my evaluation she is alert, cooperative, no acute distress.  She is perseverating about being \"traumatized\".  I did review the note from her therapist visit earlier today, and her presentation at her therapy visit again is very consistent with her very frequent ED visits.    I did offer the patient Zyprexa here in the emergency department as she states she felt so upset and \"traumatized\" that she does not know what to do.  She initially accepted this, however later when we did advise her that she was going to be discharged she became upset with this and says she will not take the Zyprexa any longer.  This is typical behavior for Sadaf.  Patient will be discharged from the emergency department, behavior is chronic and her SI is chronic, this is the reason why she is in a group home in the first place.  Strongly advised that she follow-up with her psychiatrist and her therapist.    I have reviewed the nursing notes. I have reviewed the findings, diagnosis, plan and need for follow up with the patient.    Discharge Medication List as of 7/26/2024 10:23 PM          Final diagnoses:   Borderline personality disorder (H)   Frequent patient in emergency department       Deepa Pelaez  [facility] EMERGENCY DEPARTMENT  7/26/2024        Deepa Pelaez MD  07/27/24 0117    "

## 2024-07-29 ENCOUNTER — HOSPITAL ENCOUNTER (EMERGENCY)
Facility: CLINIC | Age: 24
Discharge: HOME | End: 2024-07-29
Attending: FAMILY MEDICINE | Admitting: FAMILY MEDICINE
Payer: MEDICARE

## 2024-07-29 VITALS
DIASTOLIC BLOOD PRESSURE: 71 MMHG | OXYGEN SATURATION: 98 % | SYSTOLIC BLOOD PRESSURE: 110 MMHG | HEART RATE: 97 BPM | TEMPERATURE: 98 F | RESPIRATION RATE: 16 BRPM

## 2024-07-29 DIAGNOSIS — G47.9 DIFFICULTY SLEEPING: ICD-10-CM

## 2024-07-29 DIAGNOSIS — F60.3 BORDERLINE PERSONALITY DISORDER (H): Chronic | ICD-10-CM

## 2024-07-29 DIAGNOSIS — F79 INTELLECTUAL DISABILITY: Chronic | ICD-10-CM

## 2024-07-29 PROCEDURE — 99284 EMERGENCY DEPT VISIT MOD MDM: CPT | Performed by: FAMILY MEDICINE

## 2024-07-29 PROCEDURE — 99283 EMERGENCY DEPT VISIT LOW MDM: CPT | Performed by: FAMILY MEDICINE

## 2024-07-29 RX ORDER — TRAZODONE HYDROCHLORIDE 50 MG/1
50 TABLET, FILM COATED ORAL AT BEDTIME
Qty: 30 TABLET | Refills: 0 | Status: SHIPPED | OUTPATIENT
Start: 2024-07-29

## 2024-07-29 ASSESSMENT — COLUMBIA-SUICIDE SEVERITY RATING SCALE - C-SSRS
1. IN THE PAST MONTH, HAVE YOU WISHED YOU WERE DEAD OR WISHED YOU COULD GO TO SLEEP AND NOT WAKE UP?: NO
2. HAVE YOU ACTUALLY HAD ANY THOUGHTS OF KILLING YOURSELF IN THE PAST MONTH?: NO
6. HAVE YOU EVER DONE ANYTHING, STARTED TO DO ANYTHING, OR PREPARED TO DO ANYTHING TO END YOUR LIFE?: YES

## 2024-07-30 ENCOUNTER — LAB REQUISITION (OUTPATIENT)
Dept: LAB | Facility: CLINIC | Age: 24
End: 2024-07-30
Payer: MEDICARE

## 2024-07-30 DIAGNOSIS — Z51.81 ENCOUNTER FOR THERAPEUTIC DRUG LEVEL MONITORING: ICD-10-CM

## 2024-07-30 PROCEDURE — 80165 DIPROPYLACETIC ACID FREE: CPT | Mod: ORL | Performed by: NURSE PRACTITIONER

## 2024-07-30 NOTE — ED PROVIDER NOTES
[facility] EMERGENCY DEPARTMENT ([facility])    7/29/24      ED PROVIDER NOTE    History     Chief Complaint   Patient presents with    Fatigue     Fatigue, weakness and agitation at the group home, calm and cooperative in route per EMS.     HPI  Sadaf Ross is a 23 year old female with PMHx notable for borderline personality disorder, intellectual disability, bipolar affective disorder, chronic SI, and frequent ED visits who presents to the ED via EMS from  for evaluation of fatigue.     Past Medical History  Past Medical History:   Diagnosis Date    ADHD (attention deficit hyperactivity disorder)     Bipolar 1 disorder (H)     Borderline personality disorder (H)     Depressive disorder     Intellectual disability     Obesity     Syncope      Past Surgical History:   Procedure Laterality Date    APPENDECTOMY       traZODone (DESYREL) 50 MG tablet  acetaminophen (TYLENOL) 325 MG tablet  ARIPiprazole lauroxil ER (ARISTADA) 882 MG/3.2ML intra-muscular  bisacodyl (DULCOLAX) 5 MG EC tablet  cloNIDine (CATAPRES) 0.1 MG tablet  diclofenac (VOLTAREN) 1 % topical gel  dicyclomine (BENTYL) 20 MG tablet  divalproex sodium extended-release (DEPAKOTE ER) 250 MG 24 hr tablet  docusate sodium (COLACE) 50 MG capsule  doxylamine (UNISOM) 25 MG TABS tablet  famotidine (PEPCID) 20 MG tablet  FLUoxetine (PROZAC) 40 MG capsule  hydrOXYzine (ATARAX) 50 MG tablet  lactase (LACTAID) 3000 UNIT tablet  lactase (LACTAID) 3000 UNIT tablet  levothyroxine (SYNTHROID/LEVOTHROID) 25 MCG tablet  loperamide (IMODIUM A-D) 2 MG tablet  OLANZapine zydis (ZYPREXA) 5 MG ODT  ondansetron (ZOFRAN) 4 MG tablet  pantoprazole (PROTONIX) 40 MG EC tablet  phenazopyridine (PYRIDIUM) 100 MG tablet  promethazine (PHENERGAN) 12.5 MG tablet  senna-docusate (SENOKOT-S/PERICOLACE) 8.6-50 MG tablet  traZODone (DESYREL) 50 MG tablet  Vitamin D, Cholecalciferol, 25 MCG (1000 UT) TABS      Allergies   Allergen Reactions    Penicillins Rash and Unknown      Family History  Family History   Problem Relation Age of Onset    Diabetes Type 1 Father     Cancer Paternal Grandfather      Social History   Social History     Tobacco Use    Smoking status: Former     Current packs/day: 0.25     Average packs/day: 0.3 packs/day for 5.0 years (1.3 ttl pk-yrs)     Types: Cigarettes, Vaping Device    Smokeless tobacco: Former   Substance Use Topics    Alcohol use: No    Drug use: No      Past medical history, past surgical history, medications, allergies, family history, and social history were reviewed with the patient. No additional pertinent items.     A complete review of systems was performed with pertinent positives and negatives noted in the HPI, and all other systems negative.    Physical Exam   BP: 110/71  Pulse: 97  Temp: 98  F (36.7  C)  Resp: 16  SpO2: 98 %  Physical Exam  Vitals and nursing note reviewed.   Constitutional:       General: She is not in acute distress.     Appearance: Normal appearance. She is not diaphoretic.   HENT:      Head: Atraumatic.      Mouth/Throat:      Mouth: Mucous membranes are moist.   Eyes:      General: No scleral icterus.     Conjunctiva/sclera: Conjunctivae normal.   Cardiovascular:      Rate and Rhythm: Normal rate.      Heart sounds: Normal heart sounds.   Pulmonary:      Effort: No respiratory distress.      Breath sounds: Normal breath sounds.   Abdominal:      General: Abdomen is flat.   Musculoskeletal:      Cervical back: Neck supple.   Skin:     General: Skin is warm.      Findings: No rash.   Neurological:      General: No focal deficit present.      Mental Status: She is alert.   Psychiatric:         Mood and Affect: Mood is anxious.         Thought Content: Thought content does not include homicidal or suicidal ideation.           ED Course, Procedures, & Data      Procedures       No results found for any visits on 07/29/24.  Medications - No data to display  Labs Ordered and Resulted from Time of ED Arrival to Time of  ED Departure - No data to display  No orders to display          Critical care was not performed.     Medical Decision Making  The patient's presentation was of moderate complexity (a chronic illness mild to moderate exacerbation, progression, or side effect of treatment).    The patient's evaluation involved:  history and exam without other MDM data elements    The patient's management necessitated moderate risk treatment with prescription medication.    Assessment & Plan        I have reviewed the nursing notes. I have reviewed the findings, diagnosis, plan and need for follow up with the patient.    Discharge Medication List as of 7/29/2024 10:04 PM        START taking these medications    Details   !! traZODone (DESYREL) 50 MG tablet Take 1 tablet (50 mg) by mouth at bedtime, Disp-30 tablet, R-0, E-Prescribe       !! - Potential duplicate medications found. Please discuss with provider.          Final diagnoses:   Intellectual disability   Borderline personality disorder (H)   Difficulty sleeping         [facility] EMERGENCY DEPARTMENT  7/29/2024     Robert Olea MD  08/06/24 2028

## 2024-07-30 NOTE — DISCHARGE INSTRUCTIONS
Discharge to home plan on increasing her trazodone as discussed from 25 mg to 50 mg follow-up with your outpatient providers

## 2024-07-30 NOTE — ED NOTES
Pt calm listening to music via phone and air pods in lobby, talkative, discharged after MD evaluation, cab called for pt to return to group home.

## 2024-08-01 ENCOUNTER — HOSPITAL ENCOUNTER (EMERGENCY)
Facility: CLINIC | Age: 24
Discharge: HOME | End: 2024-08-01
Attending: PSYCHIATRY & NEUROLOGY | Admitting: PSYCHIATRY & NEUROLOGY
Payer: MEDICARE

## 2024-08-01 DIAGNOSIS — Z76.89 FREQUENT PATIENT IN EMERGENCY DEPARTMENT: ICD-10-CM

## 2024-08-01 DIAGNOSIS — F79 INTELLECTUAL DISABILITY: ICD-10-CM

## 2024-08-01 PROCEDURE — 99283 EMERGENCY DEPT VISIT LOW MDM: CPT | Performed by: PSYCHIATRY & NEUROLOGY

## 2024-08-01 ASSESSMENT — ACTIVITIES OF DAILY LIVING (ADL): ADLS_ACUITY_SCORE: 37

## 2024-08-02 LAB
VALPROATE FREE MFR SERPL: ABNORMAL %
VALPROATE FREE SERPL-MCNC: <7 UG/ML
VALPROATE SERPL-MCNC: <7 UG/ML

## 2024-08-02 NOTE — ED TRIAGE NOTES
Per EMS, pt reports she drank 2 shots of tequila, smoked mariajuana, went home to group home where she had a sip of beer and then vomited and then ran away from the group home. Reports she hasn't taken her meds in 2 days.

## 2024-08-02 NOTE — ED TRIAGE NOTES
Triage Assessment (Adult)       Row Name 08/01/24 1930          Triage Assessment    Airway WDL WDL        Respiratory WDL    Respiratory WDL WDL        Skin Circulation/Temperature WDL    Skin Circulation/Temperature WDL WDL        Cardiac WDL    Cardiac WDL WDL        Peripheral/Neurovascular WDL    Peripheral Neurovascular WDL WDL        Cognitive/Neuro/Behavioral WDL    Cognitive/Neuro/Behavioral WDL WDL

## 2024-08-02 NOTE — ED NOTES
Bed: [facility]  Expected date: 8/1/24  Expected time: 6:55 PM  Means of arrival: Ambulance  Comments:  [place] [address] 24yo female, sucidal anxiety, ETOH, marijuana

## 2024-08-02 NOTE — ED PROVIDER NOTES
[facility] EMERGENCY DEPARTMENT ([facility])    8/01/24      ED PROVIDER NOTE    History     Chief Complaint   Patient presents with    Suicidal     HPI  Sadaf Ross is a 23 year old female with PMHx notable for borderline personality disorder, intellectual disability, bipolar affective disorder, chronic SI, and frequent ED visits who presents to the ED via EMS for evaluation of SI. Patient is well-known to this ED due to her frequent visits. She admits to drinking and called 911, citing that she is suicidal. She no longer feels suicidal. She wants to go home.     I called the group home and staff reports she had gone to the park. There was no suicidal threats when she left. They are comfortable with her return.    Past Medical History  Past Medical History:   Diagnosis Date    ADHD (attention deficit hyperactivity disorder)     Bipolar 1 disorder (H)     Borderline personality disorder (H)     Depressive disorder     Intellectual disability     Obesity     Syncope      Past Surgical History:   Procedure Laterality Date    APPENDECTOMY       acetaminophen (TYLENOL) 325 MG tablet  ARIPiprazole lauroxil ER (ARISTADA) 882 MG/3.2ML intra-muscular  bisacodyl (DULCOLAX) 5 MG EC tablet  cloNIDine (CATAPRES) 0.1 MG tablet  diclofenac (VOLTAREN) 1 % topical gel  dicyclomine (BENTYL) 20 MG tablet  divalproex sodium extended-release (DEPAKOTE ER) 250 MG 24 hr tablet  docusate sodium (COLACE) 50 MG capsule  doxylamine (UNISOM) 25 MG TABS tablet  famotidine (PEPCID) 20 MG tablet  FLUoxetine (PROZAC) 40 MG capsule  hydrOXYzine (ATARAX) 50 MG tablet  lactase (LACTAID) 3000 UNIT tablet  lactase (LACTAID) 3000 UNIT tablet  loperamide (IMODIUM A-D) 2 MG tablet  OLANZapine zydis (ZYPREXA) 5 MG ODT  ondansetron (ZOFRAN) 4 MG tablet  pantoprazole (PROTONIX) 40 MG EC tablet  phenazopyridine (PYRIDIUM) 100 MG tablet  promethazine (PHENERGAN) 12.5 MG tablet  senna-docusate (SENOKOT-S/PERICOLACE) 8.6-50 MG tablet  traZODone  (DESYREL) 50 MG tablet  traZODone (DESYREL) 50 MG tablet  Vitamin D, Cholecalciferol, 25 MCG (1000 UT) TABS  levothyroxine (SYNTHROID/LEVOTHROID) 25 MCG tablet      Allergies   Allergen Reactions    Penicillins Rash and Unknown     Family History  Family History   Problem Relation Age of Onset    Diabetes Type 1 Father     Cancer Paternal Grandfather      Social History   Social History     Tobacco Use    Smoking status: Former     Current packs/day: 0.25     Average packs/day: 0.3 packs/day for 5.0 years (1.3 ttl pk-yrs)     Types: Cigarettes, Vaping Device    Smokeless tobacco: Former   Substance Use Topics    Alcohol use: No    Drug use: No      Past medical history, past surgical history, medications, allergies, family history, and social history were reviewed with the patient. No additional pertinent items.     A medically appropriate review of systems was performed with pertinent positives and negatives noted in the HPI, and all other systems negative.    Physical Exam      Physical Exam  Vitals and nursing note reviewed.   HENT:      Head: Normocephalic.   Eyes:      Pupils: Pupils are equal, round, and reactive to light.   Pulmonary:      Effort: Pulmonary effort is normal.   Musculoskeletal:         General: Normal range of motion.      Cervical back: Normal range of motion.   Neurological:      General: No focal deficit present.      Mental Status: She is alert.   Psychiatric:         Attention and Perception: Attention normal. She does not perceive auditory or visual hallucinations.         Mood and Affect: Mood and affect normal.         Speech: Speech normal.         Behavior: Behavior normal. Behavior is not agitated, aggressive, hyperactive or combative. Behavior is cooperative.         Thought Content: Thought content normal. Thought content is not paranoid or delusional. Thought content does not include homicidal or suicidal ideation.         Cognition and Memory: Cognition and memory normal.          Judgment: Judgment normal.           ED Course, Procedures, & Data      Procedures       10 minutes spent reviewing prior records and intervention.     No results found for any visits on 08/01/24.  Medications - No data to display  Labs Ordered and Resulted from Time of ED Arrival to Time of ED Departure - No data to display  No orders to display          Critical care was not performed.     Medical Decision Making  The patient's presentation was of low complexity (a stable chronic illness).    The patient's evaluation involved:  review of external note(s) from 2 sources (see separate area of note for details)  review of 2 test result(s) ordered prior to this encounter (see separate area of note for details)  ordering and/or review of 1 test(s) in this encounter (see separate area of note for details)    The patient's management necessitated moderate risk (limitations due to social determinants of health (impulsive and excessive ED user)).    Assessment & Plan    Patient is here via EMS as she called 911 on herself when at the park, citing that she had run away from her group home and was feeling suicidal. Staff denied that she had run away and there were no concerns for her safety when she left to go to the park. Patient appears at baseline. She unfortunately continues to excessively use the ED to get validated about feeling distressed. She now is ready to go home. She can be discharged.    I have reviewed the nursing notes. I have reviewed the findings, diagnosis, plan and need for follow up with the patient.    New Prescriptions    No medications on file       Final diagnoses:   Intellectual disability   Frequent patient in emergency department       Magno Sena MD  [facility] EMERGENCY DEPARTMENT  8/1/2024     Magno Sena MD  08/01/24 1938

## 2024-08-05 ENCOUNTER — TELEPHONE (OUTPATIENT)
Dept: BEHAVIORAL HEALTH | Facility: CLINIC | Age: 24
End: 2024-08-05
Payer: MEDICARE

## 2024-08-05 ENCOUNTER — HOSPITAL ENCOUNTER (EMERGENCY)
Facility: CLINIC | Age: 24
Discharge: HOME | End: 2024-08-05
Attending: PSYCHIATRY & NEUROLOGY | Admitting: PSYCHIATRY & NEUROLOGY
Payer: MEDICARE

## 2024-08-05 VITALS
RESPIRATION RATE: 16 BRPM | DIASTOLIC BLOOD PRESSURE: 89 MMHG | BODY MASS INDEX: 43.41 KG/M2 | SYSTOLIC BLOOD PRESSURE: 134 MMHG | WEIGHT: 245 LBS | HEART RATE: 86 BPM | HEIGHT: 63 IN | OXYGEN SATURATION: 97 % | TEMPERATURE: 98.1 F

## 2024-08-05 DIAGNOSIS — Z76.89 FREQUENT PATIENT IN EMERGENCY DEPARTMENT: ICD-10-CM

## 2024-08-05 DIAGNOSIS — F79 INTELLECTUAL DISABILITY: ICD-10-CM

## 2024-08-05 PROCEDURE — 99284 EMERGENCY DEPT VISIT MOD MDM: CPT | Performed by: PSYCHIATRY & NEUROLOGY

## 2024-08-05 PROCEDURE — 99282 EMERGENCY DEPT VISIT SF MDM: CPT | Performed by: PSYCHIATRY & NEUROLOGY

## 2024-08-05 ASSESSMENT — COLUMBIA-SUICIDE SEVERITY RATING SCALE - C-SSRS
3. HAVE YOU BEEN THINKING ABOUT HOW YOU MIGHT KILL YOURSELF?: YES
5. HAVE YOU STARTED TO WORK OUT OR WORKED OUT THE DETAILS OF HOW TO KILL YOURSELF? DO YOU INTEND TO CARRY OUT THIS PLAN?: YES
4. HAVE YOU HAD THESE THOUGHTS AND HAD SOME INTENTION OF ACTING ON THEM?: YES
2. HAVE YOU ACTUALLY HAD ANY THOUGHTS OF KILLING YOURSELF IN THE PAST MONTH?: YES
6. HAVE YOU EVER DONE ANYTHING, STARTED TO DO ANYTHING, OR PREPARED TO DO ANYTHING TO END YOUR LIFE?: YES
1. IN THE PAST MONTH, HAVE YOU WISHED YOU WERE DEAD OR WISHED YOU COULD GO TO SLEEP AND NOT WAKE UP?: YES

## 2024-08-06 NOTE — TELEPHONE ENCOUNTER
"R: 8:05 PM Nanci [facility] Lyssa calling with collateral.  If anyone has any questions, Nanci can be reached at [phone] or after 11PM call [phone].      LYSSA went to pt's group home today.  Pt reports she smoked meth with friends 2 days ago and said she \"almost \".  Pt reports a very bad experience.  Pt also reports urges to use again.  Pt was at a park today and found a broken floor tile and brought it home with the thought of using it to cut herself.    Pt reports missing recent medical appointments.  Unsure if pt has been taking medications.  Pt reports having Bipolar and RAMON.  Pt reports at baseline she has suicidal thoughts but it has been more extreme the past couple days.  Pt reports she has also been having thoughts of running [place] the [place] to get hit by a car and it was taking her \"everything she has in her\" to not act on these thoughts.  Per LYSSA, pt's group home is willing to take her back.  Pt coming voluntary as she knows her SI is currently higher than her baseline.  "

## 2024-08-06 NOTE — ED PROVIDER NOTES
[facility] EMERGENCY DEPARTMENT ([facility])    8/05/24      ED PROVIDER NOTE    History     Chief Complaint   Patient presents with    Suicidal     Brought in by SIDDHARTH, per pt she had alcohol 2 days, meth     HPI  Sadaf Ross is a 23 year old female with ED care plan in place for history of borderline personality disorder, major depressive disorder, bipolar disorder, anxiety, chronic sucidal ideation, and frequent ED visits who presents to the ED via [facility] for evaluation of self harm and meth use.     Patient is well-known to me and this ED due to her frequent visits. She was last seen by me 3 days ago. She reports using meth this weekend and now has urges to use and to harm herself.    I discussed perhaps it is time to admit her. She declined and wants to go home.  is comfortable with her return.    Past Medical History  Past Medical History:   Diagnosis Date    ADHD (attention deficit hyperactivity disorder)     Bipolar 1 disorder (H)     Borderline personality disorder (H)     Depressive disorder     Intellectual disability     Obesity     Syncope      Past Surgical History:   Procedure Laterality Date    APPENDECTOMY       acetaminophen (TYLENOL) 325 MG tablet  ARIPiprazole lauroxil ER (ARISTADA) 882 MG/3.2ML intra-muscular  bisacodyl (DULCOLAX) 5 MG EC tablet  cloNIDine (CATAPRES) 0.1 MG tablet  diclofenac (VOLTAREN) 1 % topical gel  dicyclomine (BENTYL) 20 MG tablet  divalproex sodium extended-release (DEPAKOTE ER) 250 MG 24 hr tablet  docusate sodium (COLACE) 50 MG capsule  doxylamine (UNISOM) 25 MG TABS tablet  famotidine (PEPCID) 20 MG tablet  FLUoxetine (PROZAC) 40 MG capsule  hydrOXYzine (ATARAX) 50 MG tablet  lactase (LACTAID) 3000 UNIT tablet  lactase (LACTAID) 3000 UNIT tablet  levothyroxine (SYNTHROID/LEVOTHROID) 25 MCG tablet  loperamide (IMODIUM A-D) 2 MG tablet  OLANZapine zydis (ZYPREXA) 5 MG ODT  ondansetron (ZOFRAN) 4 MG tablet  pantoprazole (PROTONIX) 40 MG EC  "tablet  phenazopyridine (PYRIDIUM) 100 MG tablet  promethazine (PHENERGAN) 12.5 MG tablet  senna-docusate (SENOKOT-S/PERICOLACE) 8.6-50 MG tablet  traZODone (DESYREL) 50 MG tablet  traZODone (DESYREL) 50 MG tablet  Vitamin D, Cholecalciferol, 25 MCG (1000 UT) TABS      Allergies   Allergen Reactions    Penicillins Rash and Unknown     Family History  Family History   Problem Relation Age of Onset    Diabetes Type 1 Father     Cancer Paternal Grandfather      Social History   Social History     Tobacco Use    Smoking status: Former     Current packs/day: 0.25     Average packs/day: 0.3 packs/day for 5.0 years (1.3 ttl pk-yrs)     Types: Cigarettes, Vaping Device    Smokeless tobacco: Former   Substance Use Topics    Alcohol use: No    Drug use: No      Past medical history, past surgical history, medications, allergies, family history, and social history were reviewed with the patient. No additional pertinent items.     A medically appropriate review of systems was performed with pertinent positives and negatives noted in the HPI, and all other systems negative.    Physical Exam   BP: (!) 168/105  Pulse: 101  Temp: 98.1  F (36.7  C)  Resp: 16  Height: 160 cm (5' 3\")  Weight: 111.1 kg (245 lb)  SpO2: 100 %  Physical Exam  Vitals and nursing note reviewed.   HENT:      Head: Normocephalic.   Eyes:      Pupils: Pupils are equal, round, and reactive to light.   Pulmonary:      Effort: Pulmonary effort is normal.   Musculoskeletal:         General: Normal range of motion.      Cervical back: Normal range of motion.   Neurological:      General: No focal deficit present.      Mental Status: She is alert.   Psychiatric:         Attention and Perception: Attention and perception normal.         Mood and Affect: Mood and affect normal.         Speech: Speech normal.         Behavior: Behavior normal. Behavior is not agitated, aggressive, hyperactive or combative. Behavior is cooperative.         Thought Content: Thought " content normal. Thought content is not paranoid or delusional. Thought content does not include homicidal or suicidal ideation.         Cognition and Memory: Cognition and memory normal.         Judgment: Judgment normal.         ED Course, Procedures, & Data      Procedures       10 minutes spent reviewing prior records and intervention.     No results found for any visits on 08/05/24.  Medications - No data to display  Labs Ordered and Resulted from Time of ED Arrival to Time of ED Departure - No data to display  No orders to display          Critical care was not performed.     Medical Decision Making  The patient's presentation was of moderate complexity (2 or more stable chronic illnesses).    The patient's evaluation involved:  review of external note(s) from 2 sources (see separate area of note for details)  review of 2 test result(s) ordered prior to this encounter (see separate area of note for details)    The patient's management necessitated moderate risk (limitations due to social determinants of health (malingering behavior concerns)).    Assessment & Plan    Patient is here after she called Crisis and [facility] saw her and sent her in. Upon arrival, she admits to having urges to harm herself and when I told her then we should consider admitting her. I also stressed that perhaps it is time to pursue commitment and get her a guardian. She adamantly wants to go home and commits to safety. She did not exhibit altered mental status and appeared in emotional and behavioral control. I do not feel she is holdable and will discharge her to home.    I have reviewed the nursing notes. I have reviewed the findings, diagnosis, plan and need for follow up with the patient.    New Prescriptions    No medications on file       Final diagnoses:   Intellectual disability   Frequent patient in emergency department       Magno Sena MD  [facility] EMERGENCY DEPARTMENT  8/5/2024     Magno Sena,  MD  08/05/24 0617

## 2024-08-08 ENCOUNTER — HOSPITAL ENCOUNTER (EMERGENCY)
Facility: HOSPITAL | Age: 24
Discharge: HOME | End: 2024-08-08
Attending: FAMILY MEDICINE | Admitting: FAMILY MEDICINE
Payer: MEDICARE

## 2024-08-08 VITALS
TEMPERATURE: 98.8 F | DIASTOLIC BLOOD PRESSURE: 70 MMHG | WEIGHT: 248.7 LBS | HEART RATE: 89 BPM | SYSTOLIC BLOOD PRESSURE: 117 MMHG | RESPIRATION RATE: 18 BRPM | BODY MASS INDEX: 44.07 KG/M2 | HEIGHT: 63 IN | OXYGEN SATURATION: 98 %

## 2024-08-08 DIAGNOSIS — K64.4 BLEEDING EXTERNAL HEMORRHOIDS: ICD-10-CM

## 2024-08-08 PROCEDURE — 99284 EMERGENCY DEPT VISIT MOD MDM: CPT

## 2024-08-08 RX ORDER — GLYCERIN, LIDOCAINE, PETROLATUM, AND PHENYLEPHRINE HYDROCHLORIDE 144; 50; 150; 2.5 MG/G; MG/G; MG/G; MG/G
1 CREAM TOPICAL EVERY 4 HOURS PRN
Qty: 28 G | Refills: 0 | Status: SHIPPED | OUTPATIENT
Start: 2024-08-08

## 2024-08-08 ASSESSMENT — ACTIVITIES OF DAILY LIVING (ADL): ADLS_ACUITY_SCORE: 37

## 2024-08-09 NOTE — ED PROVIDER NOTES
EMERGENCY DEPARTMENT ENCOUNTER      NAME: Sadaf Ross  AGE: 23 year old female  YOB: 2000  MRN: [id number]  EVALUATION DATE & TIME: 8/8/2024  9:47 PM    PCP: [facility]    ED PROVIDER: Matt Solorzano M.D.    Chief Complaint   Patient presents with    Rectal Bleeding       FINAL IMPRESSION:  No diagnosis found.    ED COURSE & MEDICAL DECISION MAKING:    Pertinent Labs & Imaging studies independently interpreted by me. (See chart for details)  Reviewed most recent emergency department evaluation from August 6 when patient was seen for chronic SI, as well as ongoing alcohol alcohol use and methamphetamine use, plan for outpatient CD services.  Patient with frequent emergency department visits including 6 in the past week with today being the seventh.  ED Course as of 08/08/24 2214 Thu Aug 08, 2024   2210 Patient seen and examined, presents with rectal pain and rectal bleeding.  Says that she is passing clots the size of a dime or quarter, also has a little bit of blood in the underwear.  No abdominal pain.  On exam here, vitally stable, no abdominal tenderness.  She has a Opill external hemorrhoids, one at approximately the 7 o'clock position which is fresh clot on, minimally tender not firm, no evidence for thrombosis at this time.  Discussed diagnosis with the patient, she already takes a stool softener and will add topical hemorrhoid cream as well.         At the conclusion of the encounter I discussed the results of all of the tests and the disposition. The questions were answered. The patient or family acknowledged understanding and was agreeable with the care plan.     Medical Decision Making  Obtained supplemental history:Supplemental history obtained?: No  Reviewed external records: External records reviewed?: Documented in chart  Care impacted by chronic illness:Mental Health  Care significantly affected by social determinants of health:Access to Affordable Health Care  Did you  consider but not order tests?: Work up considered but not performed and documented in chart, if applicable  Did you interpret images independently?: Independent interpretation of ECG and images noted in documentation, when applicable.  Consultation discussion with other provider:Did you involve another provider (consultant, , pharmacy, etc.)?: No  Discharge. I prescribed additional prescription strength medication(s) as charted. N/A.    MEDICATIONS GIVEN IN THE EMERGENCY:  Medications - No data to display    NEW PRESCRIPTIONS STARTED AT TODAY'S ER VISIT  New Prescriptions    No medications on file       =================================================================    HPI    Patient information was obtained from: patient      Sadaf Ross is a 23 year old female with a pertinent history of frequent emergency department visits who presents to this ED for evaluation of rectal bleeding.  Patient notes passing small blood clots when she stools and blood on the toilet paper when she wiped.  Also has some pain with stooling and some rectal pain.  Denies trauma.  Denies abdominal pain, fever, chills.      REVIEW OF SYSTEMS   Review of Systems   All other systems reviewed and negative    PAST MEDICAL HISTORY:  Past Medical History:   Diagnosis Date    ADHD (attention deficit hyperactivity disorder)     Bipolar 1 disorder (H)     Borderline personality disorder (H)     Depressive disorder     Intellectual disability     Obesity     Syncope        PAST SURGICAL HISTORY:  Past Surgical History:   Procedure Laterality Date    APPENDECTOMY         CURRENT MEDICATIONS:    No current facility-administered medications for this encounter.     Current Outpatient Medications   Medication Sig Dispense Refill    acetaminophen (TYLENOL) 325 MG tablet Take 650 mg by mouth every 6 hours as needed for mild pain      ARIPiprazole lauroxil ER (ARISTADA) 882 MG/3.2ML intra-muscular Inject 882 mg into the muscle every 28 days On the  22nd of each month      bisacodyl (DULCOLAX) 5 MG EC tablet Take 1 tablet (5 mg) by mouth daily as needed for constipation 30 tablet 0    cloNIDine (CATAPRES) 0.1 MG tablet Take 1 tablet by mouth daily      diclofenac (VOLTAREN) 1 % topical gel Apply 2 g topically 4 times daily 350 g 0    dicyclomine (BENTYL) 20 MG tablet Take 20 mg by mouth 2 times daily as needed (dyspepsia)      divalproex sodium extended-release (DEPAKOTE ER) 250 MG 24 hr tablet Take 500 mg by mouth daily      docusate sodium (COLACE) 50 MG capsule Take 100 mg by mouth 2 times daily      doxylamine (UNISOM) 25 MG TABS tablet Take 1 tablet (25 mg) by mouth at bedtime 30 tablet 0    famotidine (PEPCID) 20 MG tablet Take 20 mg by mouth daily      FLUoxetine (PROZAC) 40 MG capsule Take 80 mg by mouth daily      hydrOXYzine (ATARAX) 50 MG tablet Take 50 mg by mouth 2 times daily as needed for anxiety      lactase (LACTAID) 3000 UNIT tablet Take 1 tablet (3,000 Units) by mouth 3 times daily (with meals) 30 tablet 0    lactase (LACTAID) 3000 UNIT tablet Take 1 tablet (3,000 Units) by mouth 3 times daily as needed for indigestion 30 tablet 1    levothyroxine (SYNTHROID/LEVOTHROID) 25 MCG tablet Take 1 tablet (25 mcg) by mouth daily for 30 days 30 tablet 0    loperamide (IMODIUM A-D) 2 MG tablet Take 2 tablets (4 mg) in the morning.  Take 1 tablet (2 mg) with every loose stool.  Do not exceed 8 tablets in a day. 30 tablet 0    OLANZapine zydis (ZYPREXA) 5 MG ODT Take 1 tablet (5 mg) by mouth At Bedtime 30 tablet 0    ondansetron (ZOFRAN) 4 MG tablet Take 1 tablet (4 mg) by mouth every 8 hours as needed 15 tablet 0    pantoprazole (PROTONIX) 40 MG EC tablet Take 40 mg by mouth daily      phenazopyridine (PYRIDIUM) 100 MG tablet Take 1 tablet (100 mg) by mouth 3 times daily as needed for urinary tract discomfort 6 tablet 0    promethazine (PHENERGAN) 12.5 MG tablet Take 12.5 mg by mouth every 4 hours      senna-docusate (SENOKOT-S/PERICOLACE) 8.6-50 MG  tablet Take 1 tablet by mouth 2 times daily      traZODone (DESYREL) 50 MG tablet Take 1 tablet (50 mg) by mouth at bedtime 30 tablet 0    traZODone (DESYREL) 50 MG tablet Take 100 mg by mouth At Bedtime      Vitamin D, Cholecalciferol, 25 MCG (1000 UT) TABS Take 1,000 Units by mouth daily          ALLERGIES:  Allergies   Allergen Reactions    Penicillins Rash and Unknown       FAMILY HISTORY:  Family History   Problem Relation Age of Onset    Diabetes Type 1 Father     Cancer Paternal Grandfather        SOCIAL HISTORY:   Social History     Socioeconomic History    Marital status: Single   Tobacco Use    Smoking status: Former     Current packs/day: 0.25     Average packs/day: 0.3 packs/day for 5.0 years (1.3 ttl pk-yrs)     Types: Cigarettes, Vaping Device    Smokeless tobacco: Former   Substance and Sexual Activity    Alcohol use: No    Drug use: No    Sexual activity: Not Currently     Partners: Female, Male     Birth control/protection: Injection     Social Determinants of Health     Financial Resource Strain: Not on File (1/20/2021)    Received from [facility]    Financial Resource Strain     Financial Resource Strain: 0   Recent Concern: Financial Resource Strain - At Risk (1/20/2021)    [facility] from [facility]    Financial Resource Strain     Financial Resource Strain: 2   Food Insecurity: Not on File (1/20/2021)    Received from [facility]    Food Insecurity     Food: 0   Recent Concern: Food Insecurity - At Risk (1/20/2021)    Received from [facility]    Food Insecurity     Food: 2   Transportation Needs: Not on File (1/20/2021)    Received from [facility]    Transportation Needs     Transportation: 0   Recent Concern: Transportation Needs - At Risk (1/20/2021)    Received from [facility]    Transportation Needs     Transportation: 2   Physical Activity: Not on File (7/17/2020)    Received from [facility]    Physical Activity     Physical Activity: 0   Stress: Not on File (1/20/2021)    Received from [facility]     "Stress     Stress: 0   Social Connections: Not at Risk (2021)    Received from [facility]    Social Gertrude     Social Connections and Isolation: 1   Interpersonal Safety: Not At Risk (8/3/2024)    Received from [facility]     Humiliation, Afraid, Rape, and Kick questionnaire     Fear of Current or Ex-Partner: No     Emotionally Abused: No     Physically Abused: No     Sexually Abused: No   Housing Stability: Not on File (2021)    Received from [facility]    Housing Stability     Housin       VITALS:  /70   Pulse 89   Temp 98.8  F (37.1  C) (Oral)   Resp 18   Ht 1.6 m (5' 3\")   Wt 112.8 kg (248 lb 11.2 oz)   SpO2 98%   BMI 44.06 kg/m      PHYSICAL EXAM:  Physical Exam  Vitals and nursing note reviewed.   Constitutional:       Appearance: Normal appearance.   HENT:      Head: Normocephalic and atraumatic.      Right Ear: External ear normal.      Left Ear: External ear normal.      Nose: Nose normal.      Mouth/Throat:      Mouth: Mucous membranes are moist.   Eyes:      Extraocular Movements: Extraocular movements intact.      Conjunctiva/sclera: Conjunctivae normal.      Pupils: Pupils are equal, round, and reactive to light.   Cardiovascular:      Rate and Rhythm: Normal rate and regular rhythm.   Pulmonary:      Effort: Pulmonary effort is normal.      Breath sounds: Normal breath sounds. No wheezing or rales.   Abdominal:      General: Abdomen is flat. There is no distension.      Palpations: Abdomen is soft.      Tenderness: There is no abdominal tenderness. There is no guarding.   Genitourinary:     Comments: Performed rectal exam, small external hemorrhoids with fresh clot on one at about 7:00  Musculoskeletal:         General: Normal range of motion.      Cervical back: Normal range of motion and neck supple.      Right lower leg: No edema.      Left lower leg: No edema.   Lymphadenopathy:      Cervical: No cervical adenopathy.   Skin:     General: Skin is warm and dry. "   Neurological:      General: No focal deficit present.      Mental Status: She is alert and oriented to person, place, and time. Mental status is at baseline.      Comments: No gross focal neurologic deficits   Psychiatric:         Mood and Affect: Mood normal.         Behavior: Behavior normal.         Thought Content: Thought content normal.       Matt Solorzano M.D.  Emergency Medicine  [facility] EMERGENCY DEPARTMENT  [facility] [facility] [id number] [phone]  Dept: [phone]       Matt Solorzano MD  08/08/24 8740

## 2024-08-09 NOTE — ED TRIAGE NOTES
Comes to ED for evaluation of rectal bleeding. Stated had 5 episodes today of bright red to dark red blood clots when having bowel movement. Size ranges from dime sized to size of a quarter.      Triage Assessment (Adult)       Row Name 08/08/24 4007          Triage Assessment    Airway WDL WDL        Respiratory WDL    Respiratory WDL WDL        Skin Circulation/Temperature WDL    Skin Circulation/Temperature WDL WDL        Cardiac WDL    Cardiac WDL WDL        Peripheral/Neurovascular WDL    Peripheral Neurovascular WDL WDL        Cognitive/Neuro/Behavioral WDL    Cognitive/Neuro/Behavioral WDL WDL

## 2024-10-01 ENCOUNTER — TELEPHONE (OUTPATIENT)
Dept: BEHAVIORAL HEALTH | Facility: CLINIC | Age: 24
End: 2024-10-01
Payer: MEDICARE

## 2024-10-03 ENCOUNTER — HOSPITAL ENCOUNTER (OUTPATIENT)
Dept: RADIOLOGY | Facility: HOSPITAL | Age: 24
Discharge: HOME | End: 2024-10-03
Attending: INTERNAL MEDICINE | Admitting: INTERNAL MEDICINE
Payer: MEDICARE

## 2024-10-03 DIAGNOSIS — R19.8 ALTERED BOWEL FUNCTION: ICD-10-CM

## 2024-10-03 PROCEDURE — 74018 RADEX ABDOMEN 1 VIEW: CPT

## 2024-10-16 ENCOUNTER — HOSPITAL ENCOUNTER (EMERGENCY)
Facility: HOSPITAL | Age: 24
Discharge: HOME | DRG: 101 | End: 2024-10-16
Attending: EMERGENCY MEDICINE | Admitting: EMERGENCY MEDICINE
Payer: MEDICARE

## 2024-10-16 VITALS
DIASTOLIC BLOOD PRESSURE: 74 MMHG | HEART RATE: 89 BPM | OXYGEN SATURATION: 100 % | SYSTOLIC BLOOD PRESSURE: 132 MMHG | RESPIRATION RATE: 16 BRPM | TEMPERATURE: 97.8 F

## 2024-10-16 DIAGNOSIS — R55 SYNCOPE: ICD-10-CM

## 2024-10-16 LAB
ALBUMIN UR-MCNC: NEGATIVE MG/DL
ANION GAP SERPL CALCULATED.3IONS-SCNC: 12 MMOL/L (ref 7–15)
APPEARANCE UR: CLEAR
BACTERIA #/AREA URNS HPF: ABNORMAL /HPF
BASOPHILS # BLD AUTO: 0 10E3/UL (ref 0–0.2)
BASOPHILS NFR BLD AUTO: 1 %
BILIRUB UR QL STRIP: NEGATIVE
BUN SERPL-MCNC: 8.4 MG/DL (ref 6–20)
CALCIUM SERPL-MCNC: 9.3 MG/DL (ref 8.8–10.4)
CHLORIDE SERPL-SCNC: 108 MMOL/L (ref 98–107)
COLOR UR AUTO: ABNORMAL
CREAT SERPL-MCNC: 0.81 MG/DL (ref 0.51–0.95)
EGFRCR SERPLBLD CKD-EPI 2021: >90 ML/MIN/1.73M2
EOSINOPHIL # BLD AUTO: 0.2 10E3/UL (ref 0–0.7)
EOSINOPHIL NFR BLD AUTO: 3 %
ERYTHROCYTE [DISTWIDTH] IN BLOOD BY AUTOMATED COUNT: 13.3 % (ref 10–15)
FLUAV RNA SPEC QL NAA+PROBE: NEGATIVE
FLUBV RNA RESP QL NAA+PROBE: NEGATIVE
GLUCOSE SERPL-MCNC: 94 MG/DL (ref 70–99)
GLUCOSE UR STRIP-MCNC: NEGATIVE MG/DL
HCG SERPL QL: NEGATIVE
HCO3 SERPL-SCNC: 23 MMOL/L (ref 22–29)
HCT VFR BLD AUTO: 36.8 % (ref 35–47)
HGB BLD-MCNC: 11.8 G/DL (ref 11.7–15.7)
HGB UR QL STRIP: NEGATIVE
IMM GRANULOCYTES # BLD: 0 10E3/UL
IMM GRANULOCYTES NFR BLD: 0 %
KETONES UR STRIP-MCNC: NEGATIVE MG/DL
LEUKOCYTE ESTERASE UR QL STRIP: NEGATIVE
LYMPHOCYTES # BLD AUTO: 2.1 10E3/UL (ref 0.8–5.3)
LYMPHOCYTES NFR BLD AUTO: 35 %
MAGNESIUM SERPL-MCNC: 2.3 MG/DL (ref 1.7–2.3)
MCH RBC QN AUTO: 26.8 PG (ref 26.5–33)
MCHC RBC AUTO-ENTMCNC: 32.1 G/DL (ref 31.5–36.5)
MCV RBC AUTO: 83 FL (ref 78–100)
MONOCYTES # BLD AUTO: 0.3 10E3/UL (ref 0–1.3)
MONOCYTES NFR BLD AUTO: 5 %
MUCOUS THREADS #/AREA URNS LPF: PRESENT /LPF
NEUTROPHILS # BLD AUTO: 3.4 10E3/UL (ref 1.6–8.3)
NEUTROPHILS NFR BLD AUTO: 56 %
NITRATE UR QL: NEGATIVE
NRBC # BLD AUTO: 0 10E3/UL
NRBC BLD AUTO-RTO: 0 /100
PH UR STRIP: 7 [PH] (ref 5–7)
PLATELET # BLD AUTO: 188 10E3/UL (ref 150–450)
POTASSIUM SERPL-SCNC: 4.5 MMOL/L (ref 3.4–5.3)
RBC # BLD AUTO: 4.41 10E6/UL (ref 3.8–5.2)
RBC URINE: <1 /HPF
RSV RNA SPEC NAA+PROBE: NEGATIVE
SARS-COV-2 RNA RESP QL NAA+PROBE: NEGATIVE
SODIUM SERPL-SCNC: 143 MMOL/L (ref 135–145)
SP GR UR STRIP: 1.02 (ref 1–1.03)
SQUAMOUS EPITHELIAL: 1 /HPF
UROBILINOGEN UR STRIP-MCNC: <2 MG/DL
WBC # BLD AUTO: 6 10E3/UL (ref 4–11)
WBC URINE: 1 /HPF

## 2024-10-16 PROCEDURE — 85025 COMPLETE CBC W/AUTO DIFF WBC: CPT

## 2024-10-16 PROCEDURE — 87637 SARSCOV2&INF A&B&RSV AMP PRB: CPT

## 2024-10-16 PROCEDURE — 99284 EMERGENCY DEPT VISIT MOD MDM: CPT

## 2024-10-16 PROCEDURE — 83735 ASSAY OF MAGNESIUM: CPT

## 2024-10-16 PROCEDURE — 82310 ASSAY OF CALCIUM: CPT

## 2024-10-16 PROCEDURE — 84703 CHORIONIC GONADOTROPIN ASSAY: CPT

## 2024-10-16 PROCEDURE — 93005 ELECTROCARDIOGRAM TRACING: CPT

## 2024-10-16 PROCEDURE — 80048 BASIC METABOLIC PNL TOTAL CA: CPT

## 2024-10-16 PROCEDURE — 250N000013 HC RX MED GY IP 250 OP 250 PS 637

## 2024-10-16 PROCEDURE — 85014 HEMATOCRIT: CPT

## 2024-10-16 PROCEDURE — 36415 COLL VENOUS BLD VENIPUNCTURE: CPT

## 2024-10-16 PROCEDURE — 81001 URINALYSIS AUTO W/SCOPE: CPT

## 2024-10-16 RX ORDER — ACETAMINOPHEN 325 MG/1
975 TABLET ORAL ONCE
Status: COMPLETED | OUTPATIENT
Start: 2024-10-16 | End: 2024-10-16

## 2024-10-16 RX ORDER — ONDANSETRON 4 MG/1
4 TABLET, ORALLY DISINTEGRATING ORAL ONCE
Status: COMPLETED | OUTPATIENT
Start: 2024-10-16 | End: 2024-10-16

## 2024-10-16 RX ORDER — ONDANSETRON 2 MG/ML
4 INJECTION INTRAMUSCULAR; INTRAVENOUS EVERY 30 MIN PRN
Status: DISCONTINUED | OUTPATIENT
Start: 2024-10-16 | End: 2024-10-16

## 2024-10-16 RX ADMIN — ACETAMINOPHEN 975 MG: 325 TABLET ORAL at 15:55

## 2024-10-16 ASSESSMENT — COLUMBIA-SUICIDE SEVERITY RATING SCALE - C-SSRS
6. HAVE YOU EVER DONE ANYTHING, STARTED TO DO ANYTHING, OR PREPARED TO DO ANYTHING TO END YOUR LIFE?: YES
4. HAVE YOU HAD THESE THOUGHTS AND HAD SOME INTENTION OF ACTING ON THEM?: YES
5. HAVE YOU STARTED TO WORK OUT OR WORKED OUT THE DETAILS OF HOW TO KILL YOURSELF? DO YOU INTEND TO CARRY OUT THIS PLAN?: NO
1. IN THE PAST MONTH, HAVE YOU WISHED YOU WERE DEAD OR WISHED YOU COULD GO TO SLEEP AND NOT WAKE UP?: NO
3. HAVE YOU BEEN THINKING ABOUT HOW YOU MIGHT KILL YOURSELF?: NO
2. HAVE YOU ACTUALLY HAD ANY THOUGHTS OF KILLING YOURSELF IN THE PAST MONTH?: YES

## 2024-10-16 ASSESSMENT — ACTIVITIES OF DAILY LIVING (ADL)
ADLS_ACUITY_SCORE: 39

## 2024-10-16 NOTE — ED NOTES
Pt has not thrown up since being placed in fast track - pt states thinks she can keep tylenol down.  Pt awaiting IV placement.  Pt also wondering if she'll be admitted if her covid test comes back positive as she lives with her stepmom and doesn't want to give it to her.  Informed pt that it would depend on provider and severity of sxs.

## 2024-10-16 NOTE — ED PROVIDER NOTES
Emergency Department Midlevel Supervisory Note     I had a face to face encounter with this patient seen by the Advanced Practice Provider (BETHANY). I personally made/approved the management plan and take responsibility for the patient management. I personally saw patient and performed a substantive portion of the visit including all aspects of the medical decision making.     ED Course:  5:02 PM Gaby Arambula PA-C staffed patient with me. I agree with their assessment and plan of management, and I will see the patient.  5:07 PM I met with the patient to introduce myself, gather additional history, perform my initial exam, and discuss the plan.  Patient is requesting to be discharged home.  I asked if we could obtain a repeat EKG.  She is adamant that she needs to go home.  She would like me to call for a ride.  While awaiting a ride, I did persuade her to obtain a repeat EKG and we did discuss with her follow-up with cardiology as an outpatient.    After discharge, the patient called stating she was again feeling lightheaded.  I reassured her that given her workup, I am not concerned about sudden death as she is worried that she is going to die.  I did encourage her to return for repeat evaluation if her symptoms continued or worsened.    Brief HPI:     Sadaf Ross is a 24 year old female who presents for evaluation of vomiting and dizziness. Please see the BETHANY note for a more detailed HPI.    I, Ely Reis, am serving as a scribe to document services personally performed by Roxi Lee MD, based on my observations and the provider's statements to me.  I, Roxi Lee MD, attest that Ely Reis is acting in a scribe capacity, has observed my performance of the services and has documented them in accordance with my direction.     Brief Physical Exam: /74   Pulse 89   Temp 97.8  F (36.6  C)   Resp 16   SpO2 100%   Constitutional:  Alert, in no acute distress  EYES:  Conjunctivae clear  HENT:  Atraumatic  Respiratory:  Respirations even, unlabored, in no acute respiratory distress  Cardiovascular:  Regular rate and rhythm, good peripheral perfusion  GI: Soft, non-distended, non-tender  Musculoskeletal:  Moves all 4 extremities equally, grossly symmetrical strength  Integument: Warm & dry. No appreciable rash, erythema.  Neurologic:  Alert & oriented, speech clear and fluent, no focal deficits noted  Psych: Normal mood and affect    MDM:  Patient presents after she had a syncopal event while at clinic.  She was in a chair and slumped forward.  There is no head trauma.  She is currently back at baseline.  EKG is consistent with sinus bradycardia.  Laboratory studies otherwise unremarkable.  Plan for the patient to follow-up in rapid access cardiology clinic.       1. Syncope        Labs and Imaging:  Results for orders placed or performed during the hospital encounter of 10/16/24   Basic metabolic panel   Result Value Ref Range    Sodium 143 135 - 145 mmol/L    Potassium 4.5 3.4 - 5.3 mmol/L    Chloride 108 (H) 98 - 107 mmol/L    Carbon Dioxide (CO2) 23 22 - 29 mmol/L    Anion Gap 12 7 - 15 mmol/L    Urea Nitrogen 8.4 6.0 - 20.0 mg/dL    Creatinine 0.81 0.51 - 0.95 mg/dL    GFR Estimate >90 >60 mL/min/1.73m2    Calcium 9.3 8.8 - 10.4 mg/dL    Glucose 94 70 - 99 mg/dL   Result Value Ref Range    Magnesium 2.3 1.7 - 2.3 mg/dL   HCG qualitative Blood   Result Value Ref Range    hCG Serum Qualitative Negative Negative   UA with Microscopic reflex to Culture    Specimen: Urine, Midstream   Result Value Ref Range    Color Urine Light Yellow Colorless, Straw, Light Yellow, Yellow    Appearance Urine Clear Clear    Glucose Urine Negative Negative mg/dL    Bilirubin Urine Negative Negative    Ketones Urine Negative Negative mg/dL    Specific Gravity Urine 1.022 1.001 - 1.030    Blood Urine Negative Negative    pH Urine 7.0 5.0 - 7.0    Protein Albumin Urine Negative Negative mg/dL     Urobilinogen Urine <2.0 <2.0 mg/dL    Nitrite Urine Negative Negative    Leukocyte Esterase Urine Negative Negative    Bacteria Urine Few (A) None Seen /HPF    Mucus Urine Present (A) None Seen /LPF    RBC Urine <1 <=2 /HPF    WBC Urine 1 <=5 /HPF    Squamous Epithelials Urine 1 <=1 /HPF   Symptomatic Influenza A/B, RSV, & SARS-CoV2 PCR (COVID-19) Nasopharyngeal    Specimen: Nasopharyngeal; Swab   Result Value Ref Range    Influenza A PCR Negative Negative    Influenza B PCR Negative Negative    RSV PCR Negative Negative    SARS CoV2 PCR Negative Negative   CBC with platelets and differential   Result Value Ref Range    WBC Count 6.0 4.0 - 11.0 10e3/uL    RBC Count 4.41 3.80 - 5.20 10e6/uL    Hemoglobin 11.8 11.7 - 15.7 g/dL    Hematocrit 36.8 35.0 - 47.0 %    MCV 83 78 - 100 fL    MCH 26.8 26.5 - 33.0 pg    MCHC 32.1 31.5 - 36.5 g/dL    RDW 13.3 10.0 - 15.0 %    Platelet Count 188 150 - 450 10e3/uL    % Neutrophils 56 %    % Lymphocytes 35 %    % Monocytes 5 %    % Eosinophils 3 %    % Basophils 1 %    % Immature Granulocytes 0 %    NRBCs per 100 WBC 0 <1 /100    Absolute Neutrophils 3.4 1.6 - 8.3 10e3/uL    Absolute Lymphocytes 2.1 0.8 - 5.3 10e3/uL    Absolute Monocytes 0.3 0.0 - 1.3 10e3/uL    Absolute Eosinophils 0.2 0.0 - 0.7 10e3/uL    Absolute Basophils 0.0 0.0 - 0.2 10e3/uL    Absolute Immature Granulocytes 0.0 <=0.4 10e3/uL    Absolute NRBCs 0.0 10e3/uL       I have reviewed the relevant laboratory studies above.    I independently interpreted the following imaging study(s):   No orders to display        EKG: I reviewed and independently interpreted the patient's EKG, with comments made as listed below. Please see scanned EKG for full report.   Sinus bradycardia on EKG x 2, no other arrhythmia or acute ischemia appreciated    Procedures:  I was present for the key portions of procedures documented in BETHANY/midlevel note, see midlevel note for further details.    Roxi Lee MD  [facility]  [facility] EMERGENCY DEPARTMENT  [address] [place] [id number]  [phone]       Roxi Lee MD  10/16/24 2029

## 2024-10-16 NOTE — DISCHARGE INSTRUCTIONS
Your blood work was very reassuring and did not show any evidence of acute anemia, electrolyte abnormalities, dehydration, cardiac arrhythmias. The ECG did show a slightly slow heart rate which could contribute to syncopal episodes. For this, I will give you a referral to the cardiology clinic for further evaluation. Please return to the ED for new or worsening symptoms.

## 2024-10-16 NOTE — ED TRIAGE NOTES
Pt had 2 episodes of vomiting this morning followed by near syncope.C/o abd pain generalized and states is chronic and not changed. Nausea is new. No diarrhea. Pt states is chronic suicidal ideation and thoughts have not changed and no new thoughts. No plan for suicide. Charge RN notified     Triage Assessment (Adult)       Row Name 10/16/24 1442          Triage Assessment    Airway WDL WDL        Respiratory WDL    Respiratory WDL WDL        Skin Circulation/Temperature WDL    Skin Circulation/Temperature WDL WDL        Cardiac WDL    Cardiac WDL WDL        Peripheral/Neurovascular WDL    Peripheral Neurovascular WDL WDL        Cognitive/Neuro/Behavioral WDL    Cognitive/Neuro/Behavioral WDL WDL

## 2024-10-17 ENCOUNTER — TELEPHONE (OUTPATIENT)
Dept: CARDIOLOGY | Facility: CLINIC | Age: 24
End: 2024-10-17

## 2024-10-17 ENCOUNTER — APPOINTMENT (OUTPATIENT)
Dept: CT IMAGING | Facility: HOSPITAL | Age: 24
DRG: 101 | End: 2024-10-17
Attending: FAMILY MEDICINE
Payer: MEDICARE

## 2024-10-17 ENCOUNTER — OFFICE VISIT (OUTPATIENT)
Dept: CARDIOLOGY | Facility: CLINIC | Age: 24
End: 2024-10-17
Payer: MEDICARE

## 2024-10-17 ENCOUNTER — HOSPITAL ENCOUNTER (EMERGENCY)
Facility: HOSPITAL | Age: 24
Discharge: HOME | DRG: 101 | End: 2024-10-17
Attending: FAMILY MEDICINE | Admitting: FAMILY MEDICINE
Payer: MEDICARE

## 2024-10-17 ENCOUNTER — HOSPITAL ENCOUNTER (INPATIENT)
Facility: HOSPITAL | Age: 24
LOS: 1 days | Discharge: HOME | DRG: 101 | End: 2024-10-18
Attending: FAMILY MEDICINE | Admitting: INTERNAL MEDICINE
Payer: MEDICARE

## 2024-10-17 VITALS
RESPIRATION RATE: 24 BRPM | HEART RATE: 67 BPM | DIASTOLIC BLOOD PRESSURE: 68 MMHG | OXYGEN SATURATION: 100 % | TEMPERATURE: 99.4 F | WEIGHT: 255 LBS | BODY MASS INDEX: 45.17 KG/M2 | SYSTOLIC BLOOD PRESSURE: 119 MMHG

## 2024-10-17 VITALS
HEIGHT: 63 IN | SYSTOLIC BLOOD PRESSURE: 118 MMHG | WEIGHT: 246.4 LBS | BODY MASS INDEX: 43.66 KG/M2 | RESPIRATION RATE: 18 BRPM | OXYGEN SATURATION: 100 % | HEART RATE: 69 BPM | DIASTOLIC BLOOD PRESSURE: 60 MMHG

## 2024-10-17 DIAGNOSIS — R11.0 NAUSEA: Primary | ICD-10-CM

## 2024-10-17 DIAGNOSIS — G40.909 SEIZURE DISORDER (H): ICD-10-CM

## 2024-10-17 DIAGNOSIS — R56.9 SEIZURE-LIKE ACTIVITY (H): ICD-10-CM

## 2024-10-17 DIAGNOSIS — R55 SYNCOPE, UNSPECIFIED SYNCOPE TYPE: ICD-10-CM

## 2024-10-17 DIAGNOSIS — R07.9 CHEST PAIN, UNSPECIFIED TYPE: ICD-10-CM

## 2024-10-17 DIAGNOSIS — R55 SYNCOPE, UNSPECIFIED SYNCOPE TYPE: Primary | ICD-10-CM

## 2024-10-17 DIAGNOSIS — R11.2 NAUSEA AND VOMITING, UNSPECIFIED VOMITING TYPE: ICD-10-CM

## 2024-10-17 LAB
ALBUMIN SERPL BCG-MCNC: 4.4 G/DL (ref 3.5–5.2)
ALP SERPL-CCNC: 52 U/L (ref 40–150)
ALT SERPL W P-5'-P-CCNC: 11 U/L (ref 0–50)
ANION GAP SERPL CALCULATED.3IONS-SCNC: 10 MMOL/L (ref 7–15)
AST SERPL W P-5'-P-CCNC: 10 U/L (ref 0–45)
ATRIAL RATE - MUSE: 69 BPM
BASOPHILS # BLD AUTO: 0 10E3/UL (ref 0–0.2)
BASOPHILS NFR BLD AUTO: 1 %
BILIRUB DIRECT SERPL-MCNC: <0.2 MG/DL (ref 0–0.3)
BILIRUB SERPL-MCNC: 0.2 MG/DL
BUN SERPL-MCNC: 7 MG/DL (ref 6–20)
CALCIUM SERPL-MCNC: 9 MG/DL (ref 8.8–10.4)
CHLORIDE SERPL-SCNC: 108 MMOL/L (ref 98–107)
CREAT SERPL-MCNC: 0.81 MG/DL (ref 0.51–0.95)
DIASTOLIC BLOOD PRESSURE - MUSE: NORMAL MMHG
EGFRCR SERPLBLD CKD-EPI 2021: >90 ML/MIN/1.73M2
EOSINOPHIL # BLD AUTO: 0.2 10E3/UL (ref 0–0.7)
EOSINOPHIL NFR BLD AUTO: 2 %
ERYTHROCYTE [DISTWIDTH] IN BLOOD BY AUTOMATED COUNT: 13.6 % (ref 10–15)
GLUCOSE SERPL-MCNC: 88 MG/DL (ref 70–99)
HCO3 SERPL-SCNC: 25 MMOL/L (ref 22–29)
HCT VFR BLD AUTO: 35.3 % (ref 35–47)
HGB BLD-MCNC: 11.5 G/DL (ref 11.7–15.7)
IMM GRANULOCYTES # BLD: 0 10E3/UL
IMM GRANULOCYTES NFR BLD: 0 %
INTERPRETATION ECG - MUSE: NORMAL
LIPASE SERPL-CCNC: 40 U/L (ref 13–60)
LYMPHOCYTES # BLD AUTO: 2.2 10E3/UL (ref 0.8–5.3)
LYMPHOCYTES NFR BLD AUTO: 31 %
MCH RBC QN AUTO: 27.3 PG (ref 26.5–33)
MCHC RBC AUTO-ENTMCNC: 32.6 G/DL (ref 31.5–36.5)
MCV RBC AUTO: 84 FL (ref 78–100)
MONOCYTES # BLD AUTO: 0.4 10E3/UL (ref 0–1.3)
MONOCYTES NFR BLD AUTO: 6 %
NEUTROPHILS # BLD AUTO: 4.3 10E3/UL (ref 1.6–8.3)
NEUTROPHILS NFR BLD AUTO: 60 %
NRBC # BLD AUTO: 0 10E3/UL
NRBC BLD AUTO-RTO: 0 /100
P AXIS - MUSE: 50 DEGREES
PLATELET # BLD AUTO: 181 10E3/UL (ref 150–450)
POTASSIUM SERPL-SCNC: 4.5 MMOL/L (ref 3.4–5.3)
PR INTERVAL - MUSE: 172 MS
PROT SERPL-MCNC: 7 G/DL (ref 6.4–8.3)
QRS DURATION - MUSE: 94 MS
QT - MUSE: 396 MS
QTC - MUSE: 424 MS
R AXIS - MUSE: 21 DEGREES
RBC # BLD AUTO: 4.22 10E6/UL (ref 3.8–5.2)
SODIUM SERPL-SCNC: 143 MMOL/L (ref 135–145)
SYSTOLIC BLOOD PRESSURE - MUSE: NORMAL MMHG
T AXIS - MUSE: 5 DEGREES
VENTRICULAR RATE- MUSE: 69 BPM
WBC # BLD AUTO: 7.1 10E3/UL (ref 4–11)

## 2024-10-17 PROCEDURE — 99222 1ST HOSP IP/OBS MODERATE 55: CPT | Mod: AI | Performed by: INTERNAL MEDICINE

## 2024-10-17 PROCEDURE — 83690 ASSAY OF LIPASE: CPT | Performed by: FAMILY MEDICINE

## 2024-10-17 PROCEDURE — G0378 HOSPITAL OBSERVATION PER HR: HCPCS

## 2024-10-17 PROCEDURE — 250N000011 HC RX IP 250 OP 636: Performed by: FAMILY MEDICINE

## 2024-10-17 PROCEDURE — G1010 CDSM STANSON: HCPCS

## 2024-10-17 PROCEDURE — 99285 EMERGENCY DEPT VISIT HI MDM: CPT

## 2024-10-17 PROCEDURE — 96374 THER/PROPH/DIAG INJ IV PUSH: CPT

## 2024-10-17 PROCEDURE — 80048 BASIC METABOLIC PNL TOTAL CA: CPT | Performed by: FAMILY MEDICINE

## 2024-10-17 PROCEDURE — 82248 BILIRUBIN DIRECT: CPT | Performed by: FAMILY MEDICINE

## 2024-10-17 PROCEDURE — 99284 EMERGENCY DEPT VISIT MOD MDM: CPT | Mod: 25

## 2024-10-17 PROCEDURE — 85025 COMPLETE CBC W/AUTO DIFF WBC: CPT | Performed by: FAMILY MEDICINE

## 2024-10-17 PROCEDURE — 99203 OFFICE O/P NEW LOW 30 MIN: CPT | Performed by: INTERNAL MEDICINE

## 2024-10-17 PROCEDURE — 36415 COLL VENOUS BLD VENIPUNCTURE: CPT | Performed by: FAMILY MEDICINE

## 2024-10-17 PROCEDURE — 96375 TX/PRO/DX INJ NEW DRUG ADDON: CPT

## 2024-10-17 PROCEDURE — 93000 ELECTROCARDIOGRAM COMPLETE: CPT | Performed by: INTERNAL MEDICINE

## 2024-10-17 RX ORDER — BENZTROPINE MESYLATE 1 MG/1
1 TABLET ORAL DAILY
Status: ON HOLD | COMMUNITY
End: 2024-10-17

## 2024-10-17 RX ORDER — ACETAMINOPHEN 650 MG/1
650 SUPPOSITORY RECTAL EVERY 4 HOURS PRN
Status: DISCONTINUED | OUTPATIENT
Start: 2024-10-17 | End: 2024-10-17

## 2024-10-17 RX ORDER — ONDANSETRON 4 MG/1
4 TABLET, ORALLY DISINTEGRATING ORAL EVERY 6 HOURS PRN
Status: DISCONTINUED | OUTPATIENT
Start: 2024-10-17 | End: 2024-10-18 | Stop reason: HOSPADM

## 2024-10-17 RX ORDER — ONDANSETRON 8 MG/1
8 TABLET, FILM COATED ORAL EVERY 8 HOURS PRN
Status: ON HOLD | COMMUNITY

## 2024-10-17 RX ORDER — POLYETHYLENE GLYCOL 3350 17 G/17G
1 POWDER, FOR SOLUTION ORAL DAILY PRN
Status: ON HOLD | COMMUNITY
Start: 2024-10-02

## 2024-10-17 RX ORDER — MEDROXYPROGESTERONE ACETATE 150 MG/ML
150 INJECTION, SUSPENSION INTRAMUSCULAR
Status: ON HOLD | COMMUNITY
Start: 2022-12-14 | End: 2024-10-17

## 2024-10-17 RX ORDER — FLUTICASONE PROPIONATE 50 MCG
2 SPRAY, SUSPENSION (ML) NASAL DAILY
Status: ON HOLD | COMMUNITY
Start: 2023-10-12 | End: 2024-10-17

## 2024-10-17 RX ORDER — PANTOPRAZOLE SODIUM 40 MG/1
40 TABLET, DELAYED RELEASE ORAL
Status: DISCONTINUED | OUTPATIENT
Start: 2024-10-18 | End: 2024-10-18 | Stop reason: HOSPADM

## 2024-10-17 RX ORDER — ONDANSETRON 2 MG/ML
4 INJECTION INTRAMUSCULAR; INTRAVENOUS ONCE
Status: COMPLETED | OUTPATIENT
Start: 2024-10-17 | End: 2024-10-17

## 2024-10-17 RX ORDER — ACETAMINOPHEN 325 MG/1
650 TABLET ORAL EVERY 4 HOURS PRN
Status: DISCONTINUED | OUTPATIENT
Start: 2024-10-17 | End: 2024-10-17

## 2024-10-17 RX ORDER — TRAZODONE HYDROCHLORIDE 100 MG/1
100 TABLET ORAL AT BEDTIME
Status: ON HOLD | COMMUNITY

## 2024-10-17 RX ORDER — CLOTRIMAZOLE 1 %
CREAM (GRAM) TOPICAL 2 TIMES DAILY
Status: ON HOLD | COMMUNITY

## 2024-10-17 RX ORDER — OMEPRAZOLE 40 MG/1
40 CAPSULE, DELAYED RELEASE ORAL DAILY
Status: ON HOLD | COMMUNITY
End: 2024-10-17

## 2024-10-17 RX ORDER — ONDANSETRON 2 MG/ML
4 INJECTION INTRAMUSCULAR; INTRAVENOUS EVERY 6 HOURS PRN
Status: DISCONTINUED | OUTPATIENT
Start: 2024-10-17 | End: 2024-10-18 | Stop reason: HOSPADM

## 2024-10-17 RX ORDER — LIDOCAINE 40 MG/G
CREAM TOPICAL
Status: DISCONTINUED | OUTPATIENT
Start: 2024-10-17 | End: 2024-10-18 | Stop reason: HOSPADM

## 2024-10-17 RX ORDER — KETOROLAC TROMETHAMINE 15 MG/ML
15 INJECTION, SOLUTION INTRAMUSCULAR; INTRAVENOUS ONCE
Status: COMPLETED | OUTPATIENT
Start: 2024-10-17 | End: 2024-10-17

## 2024-10-17 RX ORDER — AMOXICILLIN 250 MG
2 CAPSULE ORAL 2 TIMES DAILY PRN
Status: DISCONTINUED | OUTPATIENT
Start: 2024-10-17 | End: 2024-10-18

## 2024-10-17 RX ORDER — MULTIVITAMIN
1 TABLET ORAL DAILY
Status: ON HOLD | COMMUNITY
Start: 2023-08-03

## 2024-10-17 RX ORDER — BENZTROPINE MESYLATE 1 MG/1
1 TABLET ORAL 2 TIMES DAILY
Status: ON HOLD | COMMUNITY

## 2024-10-17 RX ORDER — MICONAZOLE NITRATE ANTIFUNGAL POWDER 2 G/100G
2 POWDER TOPICAL PRN
Status: ON HOLD | COMMUNITY
Start: 2023-11-27 | End: 2024-10-17

## 2024-10-17 RX ORDER — ACETAMINOPHEN 325 MG/1
650 TABLET ORAL EVERY 4 HOURS PRN
Status: DISCONTINUED | OUTPATIENT
Start: 2024-10-17 | End: 2024-10-18 | Stop reason: HOSPADM

## 2024-10-17 RX ORDER — CYCLOBENZAPRINE HCL 5 MG
5 TABLET ORAL 2 TIMES DAILY PRN
Status: ON HOLD | COMMUNITY
Start: 2024-01-16 | End: 2024-10-17

## 2024-10-17 RX ORDER — CALCIUM CARBONATE 500 MG/1
1000 TABLET, CHEWABLE ORAL 4 TIMES DAILY PRN
Status: DISCONTINUED | OUTPATIENT
Start: 2024-10-17 | End: 2024-10-18 | Stop reason: HOSPADM

## 2024-10-17 RX ORDER — PROCHLORPERAZINE MALEATE 10 MG
10 TABLET ORAL EVERY 6 HOURS PRN
Status: DISCONTINUED | OUTPATIENT
Start: 2024-10-17 | End: 2024-10-18 | Stop reason: HOSPADM

## 2024-10-17 RX ORDER — NITROGLYCERIN 0.4 MG/1
0.4 TABLET SUBLINGUAL EVERY 5 MIN PRN
Status: DISCONTINUED | OUTPATIENT
Start: 2024-10-17 | End: 2024-10-18 | Stop reason: HOSPADM

## 2024-10-17 RX ORDER — ALBUTEROL SULFATE 90 UG/1
1 INHALANT RESPIRATORY (INHALATION) EVERY 6 HOURS PRN
Status: ON HOLD | COMMUNITY

## 2024-10-17 RX ORDER — ECHINACEA PURPUREA EXTRACT 125 MG
1 TABLET ORAL DAILY PRN
Status: ON HOLD | COMMUNITY
Start: 2023-04-26

## 2024-10-17 RX ORDER — FLUCONAZOLE 150 MG/1
1 TABLET ORAL 2 TIMES DAILY
Status: ON HOLD | COMMUNITY
Start: 2024-03-25 | End: 2024-10-17

## 2024-10-17 RX ORDER — ACETAMINOPHEN 650 MG/1
650 SUPPOSITORY RECTAL EVERY 4 HOURS PRN
Status: DISCONTINUED | OUTPATIENT
Start: 2024-10-17 | End: 2024-10-18 | Stop reason: HOSPADM

## 2024-10-17 RX ORDER — AMOXICILLIN 250 MG
1 CAPSULE ORAL 2 TIMES DAILY PRN
Status: DISCONTINUED | OUTPATIENT
Start: 2024-10-17 | End: 2024-10-18

## 2024-10-17 RX ORDER — CETIRIZINE HYDROCHLORIDE 10 MG/1
1 TABLET ORAL DAILY
COMMUNITY
Start: 2024-09-27 | End: 2024-10-27

## 2024-10-17 RX ORDER — GUAIFENESIN 600 MG/1
1200 TABLET, EXTENDED RELEASE ORAL PRN
Status: ON HOLD | COMMUNITY
End: 2024-10-17

## 2024-10-17 RX ORDER — PROCHLORPERAZINE 25 MG
25 SUPPOSITORY, RECTAL RECTAL EVERY 12 HOURS PRN
Status: DISCONTINUED | OUTPATIENT
Start: 2024-10-17 | End: 2024-10-18 | Stop reason: HOSPADM

## 2024-10-17 RX ORDER — ONDANSETRON 4 MG/1
4 TABLET, ORALLY DISINTEGRATING ORAL EVERY 6 HOURS PRN
Qty: 20 TABLET | Refills: 0 | Status: ON HOLD | OUTPATIENT
Start: 2024-10-17 | End: 2024-10-17

## 2024-10-17 RX ADMIN — KETOROLAC TROMETHAMINE 15 MG: 15 INJECTION, SOLUTION INTRAMUSCULAR; INTRAVENOUS at 20:57

## 2024-10-17 RX ADMIN — ONDANSETRON 4 MG: 2 INJECTION INTRAMUSCULAR; INTRAVENOUS at 20:55

## 2024-10-17 ASSESSMENT — COLUMBIA-SUICIDE SEVERITY RATING SCALE - C-SSRS
2. HAVE YOU ACTUALLY HAD ANY THOUGHTS OF KILLING YOURSELF IN THE PAST MONTH?: NO
1. IN THE PAST MONTH, HAVE YOU WISHED YOU WERE DEAD OR WISHED YOU COULD GO TO SLEEP AND NOT WAKE UP?: NO
6. HAVE YOU EVER DONE ANYTHING, STARTED TO DO ANYTHING, OR PREPARED TO DO ANYTHING TO END YOUR LIFE?: NO
6. HAVE YOU EVER DONE ANYTHING, STARTED TO DO ANYTHING, OR PREPARED TO DO ANYTHING TO END YOUR LIFE?: NO
2. HAVE YOU ACTUALLY HAD ANY THOUGHTS OF KILLING YOURSELF IN THE PAST MONTH?: NO
1. IN THE PAST MONTH, HAVE YOU WISHED YOU WERE DEAD OR WISHED YOU COULD GO TO SLEEP AND NOT WAKE UP?: NO

## 2024-10-17 ASSESSMENT — ENCOUNTER SYMPTOMS
RHINORRHEA: 0
COUGH: 0
CHILLS: 0

## 2024-10-17 ASSESSMENT — ACTIVITIES OF DAILY LIVING (ADL)
ADLS_ACUITY_SCORE: 36
ADLS_ACUITY_SCORE: 36
ADLS_ACUITY_SCORE: 39
ADLS_ACUITY_SCORE: 37

## 2024-10-17 NOTE — ED PROVIDER NOTES
"EMERGENCY DEPARTMENT ENCOUNTER      NAME: Sadaf Ross  AGE: 24 year old female  YOB: 2000  MRN: [id number]  EVALUATION DATE & TIME: 10/17/2024  3:06 PM    PCP: [facility]    ED PROVIDER: Matt Solorzano M.D.    Chief Complaint   Patient presents with    Headache     Possible seizure activity       FINAL IMPRESSION:  1. Syncope, unspecified syncope type    2. Nausea and vomiting, unspecified vomiting type    3. Seizure-like activity (H)        ED COURSE & MEDICAL DECISION MAKING:    Pertinent Labs & Imaging studies independently interpreted by me. (See chart for details)  Reviewed patient's care plan, reviewed primary care visit yesterday when patient was seen with syncope and referred cardiology clinic.  Reviewed cardiology note from today when patient was complained of chest pain and dizziness, EKG was normal.    ED Course as of 10/17/24 1844   Thu Oct 17, 2024   1607 Patient seen and examined, presents today with syncope and possible seizure.  Patient was seen yesterday for syncope with negative workup, at follow-up clinic today had close described as two syncopal episodes with full body shaking.  Patient says prior to these she felt lightheaded, says she has been vomiting for the past 2 days and \"cannot keep anything down.\"  On exam here, patient is wearing her sunglasses during the interview, appropriate, oriented, no focal weakness.  She does complain of abdominal pain but says this is her chronic abdominal pain.  Labs ordered along with CT scan of the head.  Due to multiple episodes of seizure versus syncope over the last two days, consider admission for further evaluation   1746 Patient rechecked, we discussed results of testing today.  Patient would like to be discharged.  I recommended admission due to recurrent syncopal episodes and possible new onset seizure.  My concern is the patient will continue to have these episodes before she can follow-up in 1 day back in the emergency " department.  Patient refuses admission.  Will be discharged with medication to help with nausea.  Able to tolerate oral intake in the department         At the conclusion of the encounter I discussed the results of all of the tests and the disposition. The questions were answered. The patient or family acknowledged understanding and was agreeable with the care plan.     Medical Decision Making  Obtained supplemental history:Supplemental history obtained?: Documented in chart  Reviewed external records: External records reviewed?: Documented in chart and Outpatient Record: Patient was seen at [facility] [place] on 10/17/24  Care impacted by chronic illness:Mental Health  Did you consider but not order tests?: Work up considered but not performed and documented in chart, if applicable  Did you interpret images independently?: Independent interpretation of ECG and images noted in documentation, when applicable.  Consultation discussion with other provider:Did you involve another provider (consultant, , pharmacy, etc.)?: No  Discharge. No recommendations on prescription strength medication(s). I recommended admission, but the patient declined.    MIPS: Not Applicable    MEDICATIONS GIVEN IN THE EMERGENCY:  Medications - No data to display    NEW PRESCRIPTIONS STARTED AT TODAY'S ER VISIT  Discharge Medication List as of 10/17/2024  5:59 PM        START taking these medications    Details   ondansetron (ZOFRAN ODT) 4 MG ODT tab Take 1 tablet (4 mg) by mouth every 6 hours as needed for nausea., Disp-20 tablet, R-0, E-Prescribe             =================================================================    HPI    Patient information was obtained from: Patient       Sadaf Ross is a 24 year old female with a pertinent history of Mental health, bipolar affective disorder, borderline personality, intellectual disability and hyperhidrosis who presents to this ED via ambulance for evaluation of syncope.     Per  "EMS and nursing staff note, patient was seen at at [facility] today and endorses two episodes of seizure activity there. Ambulance crew describes the seizure activity as eye fluttering and mild body shaking. Patient was then transported here.     The patient reports a syncopal episode today. She reports feeling lightheaded before the syncopal episode. No fall, loss of consciousness or head injury. Patient does not remember what happened prior to the syncopal episode. She also reports new ongoing vomiting that has been occurring for two days now. Patient states \"I can't keep anything down\". She also endorses some chest pain right now. Patient has history of seizures. No new changes to medications. Denies any alcohol, smoking or drug usage. Denies any cough, cold rhinorrhea or any other symptoms. No other complaints at this time.     REVIEW OF SYSTEMS   Review of Systems   Constitutional:  Negative for chills.   HENT:  Negative for rhinorrhea.    Respiratory:  Negative for cough.    Cardiovascular:  Positive for chest pain.   Neurological:  Positive for syncope.   All other systems reviewed and are negative.     All other systems reviewed and negative    PAST MEDICAL HISTORY:  Past Medical History:   Diagnosis Date    ADHD (attention deficit hyperactivity disorder)     Bipolar 1 disorder (H)     Borderline personality disorder (H)     Depressive disorder     Intellectual disability     Obesity     Syncope        PAST SURGICAL HISTORY:  Past Surgical History:   Procedure Laterality Date    APPENDECTOMY         CURRENT MEDICATIONS:    No current facility-administered medications for this encounter.     Current Outpatient Medications   Medication Sig Dispense Refill    ondansetron (ZOFRAN ODT) 4 MG ODT tab Take 1 tablet (4 mg) by mouth every 6 hours as needed for nausea. 20 tablet 0    acetaminophen (TYLENOL) 325 MG tablet Take 650 mg by mouth every 6 hours as needed for mild pain      albuterol (PROAIR " HFA/PROVENTIL HFA/VENTOLIN HFA) 108 (90 Base) MCG/ACT inhaler Inhale 1 puff into the lungs 4 times daily.      ANTIFUNGAL 2 % external powder Apply 2 g topically as needed.      ARIPiprazole lauroxil ER (ARISTADA) 882 MG/3.2ML intra-muscular Inject 882 mg into the muscle every 28 days On the 22nd of each month      benztropine (COGENTIN) 1 MG tablet Take 1 mg by mouth daily.      bisacodyl (DULCOLAX) 5 MG EC tablet Take 1 tablet (5 mg) by mouth daily as needed for constipation 30 tablet 0    cetirizine (ZYRTEC) 10 MG tablet Take 1 tablet by mouth daily.      cloNIDine (CATAPRES) 0.1 MG tablet Take 1 tablet by mouth daily      clotrimazole (LOTRIMIN) 1 % external cream Apply topically 2 times daily.      cyclobenzaprine (FLEXERIL) 5 MG tablet Take 5 mg by mouth 2 times daily as needed for muscle spasms.      dicyclomine (BENTYL) 20 MG tablet Take 20 mg by mouth 2 times daily as needed (dyspepsia)      divalproex sodium extended-release (DEPAKOTE ER) 250 MG 24 hr tablet Take 500 mg by mouth daily      docusate sodium (COLACE) 50 MG capsule Take 100 mg by mouth 2 times daily      doxylamine (UNISOM) 25 MG TABS tablet Take 1 tablet (25 mg) by mouth at bedtime 30 tablet 0    famotidine (PEPCID) 20 MG tablet Take 20 mg by mouth daily      fluconazole (DIFLUCAN) 150 MG tablet Take 1 tablet by mouth 2 times daily.      FLUoxetine (PROZAC) 40 MG capsule Take 80 mg by mouth daily      fluticasone (FLONASE) 50 MCG/ACT nasal spray Spray 2 sprays in nostril daily.      guaiFENesin (MUCINEX) 600 MG 12 hr tablet Take 1,200 mg by mouth as needed.      hydrOXYzine (ATARAX) 50 MG tablet Take 50 mg by mouth 2 times daily as needed for anxiety      lactase (LACTAID) 3000 UNIT tablet Take 1 tablet (3,000 Units) by mouth 3 times daily as needed for indigestion 30 tablet 1    levothyroxine (SYNTHROID/LEVOTHROID) 25 MCG tablet Take 1 tablet (25 mcg) by mouth daily for 30 days 30 tablet 0    loperamide (IMODIUM A-D) 2 MG tablet Take 2  tablets (4 mg) in the morning.  Take 1 tablet (2 mg) with every loose stool.  Do not exceed 8 tablets in a day. 30 tablet 0    medroxyPROGESTERone (DEPO-PROVERA) 150 MG/ML syringe Inject 150 mg into the muscle every 3 months.      multivitamin w/minerals (MULTI-VITAMIN) tablet Take 1 tablet by mouth daily.      OLANZapine zydis (ZYPREXA) 5 MG ODT Take 1 tablet (5 mg) by mouth At Bedtime 30 tablet 0    omeprazole (PRILOSEC) 40 MG DR capsule Take 40 mg by mouth daily.      ondansetron (ZOFRAN) 4 MG tablet Take 1 tablet (4 mg) by mouth every 8 hours as needed 15 tablet 0    pantoprazole (PROTONIX) 40 MG EC tablet Take 40 mg by mouth daily      phenazopyridine (PYRIDIUM) 100 MG tablet Take 1 tablet (100 mg) by mouth 3 times daily as needed for urinary tract discomfort 6 tablet 0    polyethylene glycol (MIRALAX) 17 GM/Dose powder Take 1 Capful by mouth daily as needed for constipation.      promethazine (PHENERGAN) 12.5 MG tablet Take 12.5 mg by mouth every 4 hours      senna-docusate (SENOKOT-S/PERICOLACE) 8.6-50 MG tablet Take 1 tablet by mouth 2 times daily      sodium chloride 0.65 % nasal spray Spray 1 spray in nostril daily as needed.      traZODone (DESYREL) 50 MG tablet Take 1 tablet (50 mg) by mouth at bedtime 30 tablet 0    Vitamin D, Cholecalciferol, 25 MCG (1000 UT) TABS Take 1,000 Units by mouth daily          ALLERGIES:  Allergies   Allergen Reactions    Penicillins Rash and Unknown       FAMILY HISTORY:  Family History   Problem Relation Age of Onset    Diabetes Type 1 Father     Cancer Paternal Grandfather        SOCIAL HISTORY:   Social History     Socioeconomic History    Marital status: Single   Tobacco Use    Smoking status: Former     Current packs/day: 0.25     Average packs/day: 0.3 packs/day for 5.0 years (1.3 ttl pk-yrs)     Types: Cigarettes, Vaping Device     Passive exposure: Past    Smokeless tobacco: Never   Substance and Sexual Activity    Alcohol use: No    Drug use: Never    Sexual  activity: Not Currently     Partners: Female, Male     Birth control/protection: Injection     Social Determinants of Health     Financial Resource Strain: Not on File (2021)    Received from [facility]    Financial Resource Strain     Financial Resource Strain: 0   Recent Concern: Financial Resource Strain - At Risk (2021)    [facility] from [facility]    Financial Resource Strain     Financial Resource Strain: 2   Food Insecurity: Not on File (2021)    Received from [facility]    Food Insecurity     Food: 0   Recent Concern: Food Insecurity - At Risk (2021)    Received from [facility]    Food Insecurity     Food: 2   Transportation Needs: Not on File (2021)    Received from [facility]    Transportation Needs     Transportation: 0   Recent Concern: Transportation Needs - At Risk (2021)    Received from [facility]    Transportation Needs     Transportation: 2   Physical Activity: Not on File (2020)    Received from [facility]    Physical Activity     Physical Activity: 0   Stress: Not on File (2021)    Received from [facility]    Stress     Stress: 0   Social Connections: Not on File (2021)    Received from [facility]    Social Connections     Connectedness: 0   Interpersonal Safety: Not At Risk (2024)    Received from [facility]     Humiliation, Afraid, Rape, and Kick questionnaire     Fear of Current or Ex-Partner: No     Emotionally Abused: No     Physically Abused: No     Sexually Abused: No   Housing Stability: Not on File (2021)    Received from [facility]    Housing Stability     [place]       VITALS:  /68   Pulse 67   Temp 99.4  F (37.4  C) (Oral)   Resp 24   Wt 115.7 kg (255 lb)   LMP  (LMP Unknown)   SpO2 100%   BMI 45.17 kg/m      PHYSICAL EXAM:  Physical Exam  Constitutional:       Comments: Wearing sunglasses during exam in the hallway,           LAB:  All pertinent labs reviewed and interpreted.  Results for orders placed or performed  during the hospital encounter of 10/17/24   Head CT w/o contrast    Impression    IMPRESSION:    1.  No evidence of acute intracranial hemorrhage or mass effect.   Basic metabolic panel   Result Value Ref Range    Sodium 143 135 - 145 mmol/L    Potassium 4.5 3.4 - 5.3 mmol/L    Chloride 108 (H) 98 - 107 mmol/L    Carbon Dioxide (CO2) 25 22 - 29 mmol/L    Anion Gap 10 7 - 15 mmol/L    Urea Nitrogen 7.0 6.0 - 20.0 mg/dL    Creatinine 0.81 0.51 - 0.95 mg/dL    GFR Estimate >90 >60 mL/min/1.73m2    Calcium 9.0 8.8 - 10.4 mg/dL    Glucose 88 70 - 99 mg/dL   CBC with platelets and differential   Result Value Ref Range    WBC Count 7.1 4.0 - 11.0 10e3/uL    RBC Count 4.22 3.80 - 5.20 10e6/uL    Hemoglobin 11.5 (L) 11.7 - 15.7 g/dL    Hematocrit 35.3 35.0 - 47.0 %    MCV 84 78 - 100 fL    MCH 27.3 26.5 - 33.0 pg    MCHC 32.6 31.5 - 36.5 g/dL    RDW 13.6 10.0 - 15.0 %    Platelet Count 181 150 - 450 10e3/uL    % Neutrophils 60 %    % Lymphocytes 31 %    % Monocytes 6 %    % Eosinophils 2 %    % Basophils 1 %    % Immature Granulocytes 0 %    NRBCs per 100 WBC 0 <1 /100    Absolute Neutrophils 4.3 1.6 - 8.3 10e3/uL    Absolute Lymphocytes 2.2 0.8 - 5.3 10e3/uL    Absolute Monocytes 0.4 0.0 - 1.3 10e3/uL    Absolute Eosinophils 0.2 0.0 - 0.7 10e3/uL    Absolute Basophils 0.0 0.0 - 0.2 10e3/uL    Absolute Immature Granulocytes 0.0 <=0.4 10e3/uL    Absolute NRBCs 0.0 10e3/uL   Hepatic function panel   Result Value Ref Range    Protein Total 7.0 6.4 - 8.3 g/dL    Albumin 4.4 3.5 - 5.2 g/dL    Bilirubin Total 0.2 <=1.2 mg/dL    Alkaline Phosphatase 52 40 - 150 U/L    AST 10 0 - 45 U/L    ALT 11 0 - 50 U/L    Bilirubin Direct <0.20 0.00 - 0.30 mg/dL   Result Value Ref Range    Lipase 40 13 - 60 U/L       RADIOLOGY:  Reviewed all pertinent imaging. Please see official radiology report.  Head CT w/o contrast   Final Result   IMPRESSION:     1.  No evidence of acute intracranial hemorrhage or mass effect.          I, Let Let, am  serving as a scribe to document services personally performed by Dr. Solorzano based on my observation and the provider's statements to me. I, Matt Solorzano MD attest that Let Diane is acting in a scribe capacity, has observed my performance of the services and has documented them in accordance with my direction.    Matt Solorzano M.D.  Emergency Medicine  [facility] EMERGENCY DEPARTMENT  [address] [place] [id number]  [phone]  Dept: [phone]       Matt Solorzano MD  10/17/24 1248

## 2024-10-17 NOTE — ED TRIAGE NOTES
Pt was seen in clinic and then cardiology clinic today for ongoing chest pain that she says she has been experiencing daily x 2 weeks. Pt had 2 episodes of seizure activity at the cardiology clinic with what ambulance crew described as eye fluttering and mild body shaking and was then transported here to our ED. Pt states that she took a cab to the cardiology clinic. Pt states that she has a seizure disorder, has aura's before the seizures and is not prescribed meds for seizure disorder. She says the seizure disorder does not have a specific name. Pt is having chest pain and has a headache that she says started 5 minutes ago. Pt is alert and oriented x 4 and is moving all extremities and is speaking clearly.      Triage Assessment (Adult)       Row Name 10/17/24 7055          Triage Assessment    Airway WDL WDL        Respiratory WDL    Respiratory WDL WDL        Skin Circulation/Temperature WDL    Skin Circulation/Temperature WDL WDL        Cardiac WDL    Cardiac WDL WDL        Peripheral/Neurovascular WDL    Peripheral Neurovascular WDL WDL        Cognitive/Neuro/Behavioral WDL    Cognitive/Neuro/Behavioral WDL X  headache

## 2024-10-17 NOTE — TELEPHONE ENCOUNTER
RN Rapid Response Note    Rapid response paged overhead in [facility]. Per report from [facility] patient presented to clinic today after being seen in urgent care today reported chest pain. RN presented to room, provider Dr. Neely present.     /66, . Patient was unresponsive, breathing, palpable radial pulse. After approximately 1 min patient answered/aroused to painful stimuli, disoriented to situation and place. Reported she has a hx of seizure disorder, no recent trauma, took morning meds but denied any illicit drug use.     ECG repeated and per provider no changes noted. Patient had another witnessed 45-1 minute seizure, eye deviation and full body shaking, unresponsive. Pt was able to tell us she would like us to call her step mom Marcia who did not answer, gave ok to call her mother. Mother contacted by another RN in clinic and is aware patient is being transported to the ED. EMS contacted for transport.     Patient remained vitally stable with normal breathing rhythm and rate. Pulse easily palpable. Continued to have intermittent unresponsiveness. Dr. Neely called report to ED providers to alert them of incoming patient.     Report given to [facility] EMS/Paramedics. Patient [facility] ED.    Padmini Lundy RN on 10/17/2024 at 2:50 PM

## 2024-10-17 NOTE — ED PROVIDER NOTES
Emergency Department Encounter   NAME: Sadaf Ross ; AGE: 24 year old female ; YOB: 2000 ; MRN: [id number] ; PCP: [place], Nancy   ED PROVIDER: Gaby Arambula PA-C    Evaluation Date & Time:   10/16/2024  2:44 PM    CHIEF COMPLAINT:  Vomiting and Dizziness      FINAL IMPRESSION:    ICD-10-CM    1. Syncope  [facility]  Referral            IMPRESSION AND PLAN   MDM: Sadaf Ross is a 24 year old female with a pertinent history of intellectual disability ability, borderline personality disorder, bipolar disorder, obesity, vitamin D deficiency, chronic abdominal pain who presents to the ED by walk-in for evaluation of syncopal episode this morning with 3 episodes of vomiting prior.    Vitals - mildly hypertensive otherwise vitally stable. On exam patient appears fatigued but is nontoxic-appearing.  Cardiac and pulmonary exams unremarkable.  Cranial nerves intact.  PERRLA, EOMs intact.  Head is normocephalic, atraumatic.  No abrasions, lacerations or bruises throughout the head.  Cervical spine mildly tender to palpation however ROM intact.  Differentials include cardiac arrhythmia, electrolyte abnormality, UTI, viral syndrome, anemia, dehydration, pregnancy, vasovagal event.     Patient given Tylenol for initial headache management.  Given that patient did not hit her head, I do not believe that further imaging of the head including CT is necessary at this time, especially given that she is neurally intact.  Viral swab negative for COVID, influenza, RSV.  CBC without leukocytosis, hemoglobin stable at 11.8.  BMP without ORION or emergent electrolyte abnormality.  Magnesium WNL.  UA without concerns for infectious etiology, no blood in the urine.  Pregnancy test negative.  ECG did reveal sinus bradycardia with a rate of 56 bpm.  Will repeat this to reevaluate heart rate.  Repeat ECG revealed sinus bradycardia again at 58 bpm which may be contributing to patient's symptoms.       Upon reevaluation, patient is resting comfortably in her hospital chair and she remains vitally stable, is now normotensive.  I informed patient of her benign blood work and provided reassurance.  I did discuss with her that her heartbeat is a little bit slower than we would like, which is why I am giving her a referral to the rapid access clinic for further evaluation and possible Holter monitor if they believe that is necessary.  Additionally, patient does have a an outpatient appointment in the next couple days to discuss medications.  I encouraged her to go through each medication and side effects with her provider to ensure that no medication is causing a slightly slower heart rate.  Patient is amenable to this plan and feels comfortable discharge at this time..    Patient seen in conjunction with Dr. Lee.    History:  Supplemental history from: N/A  External Record(s) reviewed: Documented in chart    Work Up:  Chart documentation includes differential considered and any EKGs or imaging independently interpreted by provider, where specified.  In additional to work up documented, I considered the following work up: Documented in chart, if applicable.    External consultation:  Discussion of management with another provider: Documented in chart, if applicable    Complicating factors:  Care impacted by chronic illness: See HPI.  Care affected by social determinants of health: N/A    Disposition considerations: Discharge. No recommendations on prescription strength medication(s). See documentation for any additional details.  Not Applicable      Chart Review: I reviewed ED note from 9/24/2024.  Patient was seen in the emergency department for acute nausea and vomiting.  Patient was not acutely anemic or dehydrated.  Patient had no episodes of vomiting while in the emergency department.  Remainder of lab work reassuring.  Patient discharged home in stable condition.    ED COURSE:  3:12 PM I met and  introduced myself to the patient. I gathered initial history and performed my physical exam. We discussed plan for initial workup.   5:02 PM I have staffed the patient with Dr. Lee, ED MD, who has evaluated the patient and agrees with all aspects of today's care.   5:12 PM I rechecked the patient and discussed results, discharge, follow up, and reasons to return to the ED.           MEDICATIONS GIVEN IN THE EMERGENCY DEPARTMENT:  Medications   acetaminophen (TYLENOL) tablet 975 mg (975 mg Oral $Given 10/16/24 3703)   ondansetron (ZOFRAN ODT) ODT tab 4 mg (4 mg Oral Not Given 10/16/24 8929)         NEW PRESCRIPTIONS STARTED AT TODAY'S ED VISIT:  Discharge Medication List as of 10/16/2024  5:34 PM            BRIEF HPI   Patient information was obtained from: Patient    Use of Intrepreter: N/A     Sadaf Ross is a 24 year old female who presents to the emergency department following a syncopal episode that occurred this morning.  Patient reports she was at her primary care clinic this morning when she had a witnessed syncopal episode there.  She denies hitting her head, she is not on blood thinners.  Prior to the clinic visit, she woke up per usual however did have 3 episodes of urination this morning.  She denies any dysuria or hematuria.  She also did have 3 episodes of nonbilious vomiting prior to the visit.  She denies hematemesis.  Currently, patient endorses headache, nausea, vomiting, lightheadedness as if she may faint, fatigue and low energy.  She denies dizziness or room spinning sensation.      REVIEW OF SYSTEMS:  Pertinent positive and negative symptoms per HPI.       MEDICAL HISTORY     Past Medical History:   Diagnosis Date    ADHD (attention deficit hyperactivity disorder)     Bipolar 1 disorder (H)     Borderline personality disorder (H)     Depressive disorder     Intellectual disability     Obesity     Syncope        Past Surgical History:   Procedure Laterality Date    APPENDECTOMY          Family History   Problem Relation Age of Onset    Diabetes Type 1 Father     Cancer Paternal Grandfather        Social History     Tobacco Use    Smoking status: Former     Current packs/day: 0.25     Average packs/day: 0.3 packs/day for 5.0 years (1.3 ttl pk-yrs)     Types: Cigarettes, Vaping Device    Smokeless tobacco: Former   Substance Use Topics    Alcohol use: No    Drug use: No       acetaminophen (TYLENOL) 325 MG tablet  ARIPiprazole lauroxil ER (ARISTADA) 882 MG/3.2ML intra-muscular  bisacodyl (DULCOLAX) 5 MG EC tablet  cloNIDine (CATAPRES) 0.1 MG tablet  diclofenac (VOLTAREN) 1 % topical gel  dicyclomine (BENTYL) 20 MG tablet  divalproex sodium extended-release (DEPAKOTE ER) 250 MG 24 hr tablet  docusate sodium (COLACE) 50 MG capsule  doxylamine (UNISOM) 25 MG TABS tablet  famotidine (PEPCID) 20 MG tablet  FLUoxetine (PROZAC) 40 MG capsule  hydrOXYzine (ATARAX) 50 MG tablet  lactase (LACTAID) 3000 UNIT tablet  lactase (LACTAID) 3000 UNIT tablet  levothyroxine (SYNTHROID/LEVOTHROID) 25 MCG tablet  Lido-PE-Glycerin-Petrolatum (PREPARATION H RAPID RELIEF) 5-0.25-14.4-15 % CREA  loperamide (IMODIUM A-D) 2 MG tablet  OLANZapine zydis (ZYPREXA) 5 MG ODT  ondansetron (ZOFRAN) 4 MG tablet  pantoprazole (PROTONIX) 40 MG EC tablet  phenazopyridine (PYRIDIUM) 100 MG tablet  promethazine (PHENERGAN) 12.5 MG tablet  senna-docusate (SENOKOT-S/PERICOLACE) 8.6-50 MG tablet  traZODone (DESYREL) 50 MG tablet  traZODone (DESYREL) 50 MG tablet  Vitamin D, Cholecalciferol, 25 MCG (1000 UT) TABS        PHYSICAL EXAM     First Vitals:  Patient Vitals for the past 24 hrs:   BP Temp Pulse Resp SpO2   10/16/24 1706 132/74 -- -- -- --   10/16/24 1636 104/51 -- -- -- --   10/16/24 1606 113/59 -- -- -- --   10/16/24 1441 (!) 152/74 97.8  F (36.6  C) 89 16 100 %       PHYSICAL EXAM:  Physical Exam  Vitals and nursing note reviewed.   Constitutional:       General: She is not in acute distress.     Appearance: Normal appearance.  She is obese. She is not ill-appearing.   HENT:      Head: Normocephalic and atraumatic. No raccoon eyes, Navarro's sign, abrasion, contusion, right periorbital erythema, left periorbital erythema or laceration.   Eyes:      General:         Right eye: No discharge.         Left eye: No discharge.      Extraocular Movements: Extraocular movements intact.      Conjunctiva/sclera: Conjunctivae normal.      Pupils: Pupils are equal, round, and reactive to light.   Cardiovascular:      Rate and Rhythm: Normal rate and regular rhythm.      Heart sounds: Normal heart sounds.   Pulmonary:      Effort: Pulmonary effort is normal. No respiratory distress.      Breath sounds: Normal breath sounds. No stridor. No wheezing.   Musculoskeletal:      Cervical back: Normal range of motion. No rigidity.   Skin:     General: Skin is warm and dry.   Neurological:      General: No focal deficit present.      Mental Status: She is alert and oriented to person, place, and time.      Cranial Nerves: Cranial nerves 2-12 are intact. No cranial nerve deficit, dysarthria or facial asymmetry.      Sensory: Sensation is intact.      Motor: Motor function is intact.   Psychiatric:         Mood and Affect: Mood normal.          RESULTS     LAB:  All pertinent labs reviewed and interpreted  Labs Ordered and Resulted from Time of ED Arrival to Time of ED Departure   BASIC METABOLIC PANEL - Abnormal       Result Value    Sodium 143      Potassium 4.5      Chloride 108 (*)     Carbon Dioxide (CO2) 23      Anion Gap 12      Urea Nitrogen 8.4      Creatinine 0.81      GFR Estimate >90      Calcium 9.3      Glucose 94     ROUTINE UA WITH MICROSCOPIC REFLEX TO CULTURE - Abnormal    Color Urine Light Yellow      Appearance Urine Clear      Glucose Urine Negative      Bilirubin Urine Negative      Ketones Urine Negative      Specific Gravity Urine 1.022      Blood Urine Negative      pH Urine 7.0      Protein Albumin Urine Negative      Urobilinogen Urine  <2.0      Nitrite Urine Negative      Leukocyte Esterase Urine Negative      Bacteria Urine Few (*)     Mucus Urine Present (*)     RBC Urine <1      WBC Urine 1      Squamous Epithelials Urine 1     MAGNESIUM - Normal    Magnesium 2.3     HCG QUALITATIVE PREGNANCY - Normal    hCG Serum Qualitative Negative     INFLUENZA A/B, RSV, & SARS-COV2 PCR - Normal    Influenza A PCR Negative      Influenza B PCR Negative      RSV PCR Negative      SARS CoV2 PCR Negative     CBC WITH PLATELETS AND DIFFERENTIAL    WBC Count 6.0      RBC Count 4.41      Hemoglobin 11.8      Hematocrit 36.8      MCV 83      MCH 26.8      MCHC 32.1      RDW 13.3      Platelet Count 188      % Neutrophils 56      % Lymphocytes 35      % Monocytes 5      % Eosinophils 3      % Basophils 1      % Immature Granulocytes 0      NRBCs per 100 WBC 0      Absolute Neutrophils 3.4      Absolute Lymphocytes 2.1      Absolute Monocytes 0.3      Absolute Eosinophils 0.2      Absolute Basophils 0.0      Absolute Immature Granulocytes 0.0      Absolute NRBCs 0.0         RADIOLOGY:  No orders to display       EC) Performed at: 1522 on 10/16/24    Impression: Sinus bradycardia, 56 bpm, no acute ST elevation or hyperacute T waves    Rate: Sinus  Rhythm:56 bpm  Axis: Normal  MO Interval: 168 ms  QRS Interval: 88 ms  QTc Interval: 399 ms  ST Changes: None  Comparison: When compared with ECG from 24, sinus bradycardia now present compared to sinus rhythm.    EKG results reviewed and interpreted by Dr. Jesus ED MD and Gaby Arambula PA-C.    2) Performed at: 1726 on 10/16/24    Impression: Sinus bradycardia, 58 bpm, no acute ST elevation or hyperacute T waves    Rate: Sinus  Rhythm: 58 bpm  Axis: Normal  MO Interval: 176 ms  QRS Interval: 86 ms  Qtc Interval: 408 ms  ST Changes: None  Comparison: See above.    EKG results reviewed and interpreted by Dr. Sundar ED MD and Gaby Arambula PA-C.         Gaby Arambula PA-C  Emergency Medicine     [facility] EMERGENCY DEPARTMENT       Gaby Arambula PA-C  10/16/24 2023

## 2024-10-17 NOTE — ED NOTES
Bed: JNED-G  Expected date:   Expected time:   Means of arrival: Ambulance  Comments:  MPLW: Seizure

## 2024-10-17 NOTE — LETTER
10/17/2024    [facility]  [address] [address]    RE: Sadaf Ross       Dear Colleague,     I had the pleasure of seeing Sadaf Ross in the [facility].      Click to link to [facility] HEART CARE NOTE    Thank you, Dr. Arambula, for asking me to see Sadaf Ross in consultation at [facility] to evaluate syncope.      Assessment/Plan:   Syncope: The patient was evaluated at emergency room yesterday for syncope.  There is no significant findings at emergency room yesterday.  Her ECG showed normal sinus rhythm, no ischemic ST-T change, no conduction abnormality.  Laboratory test was not impressive.  The patient is referred to cardiology clinic for further evaluation today.  I was not able to see the patient yet.  She complained that chest pain, dizziness, not feeling well.  ECG was performed that showed normal sinus rhythm, normal ECG.  Clinically, she passed out for 2 minutes.  I presented the room.  The patient woke up with chest pain, no shortness of breath.  She states that she had a medical history of a seizure disorder.  Quickly, she passed out again with muscle jerking.  It lasted about after 5 minutes.  Ambulance arrived and bring the patient to emergency room for further evaluation and management.  During her syncopal episode, another ECG showed normal sinus rhythm, normal ECG.  Her blood pressure was 143/66 mmHg.  Discussed the case with ER physician.    Thank you for the opportunity to be involved in the care of Sadaf Ross. If you have any questions, please feel free to contact me.      Much or all of the text in this note was generated through the use of Dragon Dictate voice-to-text software. Errors in spelling or words which seem out of context are unintentional.   Sound alike errors, in particular, may have escaped editing.       History of Present Illness:   It is my pleasure to see  Sadaf Ross at the [facility] clinic for evaluation of New Patient. Sadaf Ross is a 24 year old female with a medical history of morbid obesity with BMI 43.65, anxiety and depression, history of seizure disorder.    The patient is referred to cardiology clinic for evaluation of her syncope.  She had a syncopal episode yesterday leading to ER visit.  Her ECG was normal.  Her laboratory test was not significant.  The patient is referred to have a cardiology evaluation today.    Before I was able to see her, she complains of dizziness, mild chest pain, not feeling well.  He passed out after when she had ECG.  Her ECG showed normal sinus rhythm, normal ECG.  She was able to walk up in 2 to 3 minutes.  She states that she had a medical history of a seizure disorder.  Shortly, she passed out again with muscle jerking which lasted about 5 minutes until ambulance staff arrived.  She woke up again.  The patient is brought to emergency room for the further evaluation and management.  ER physician is called and I discussed the case.    Past Medical History:     Patient Active Problem List   Diagnosis     MENTAL HEALTH     Suicidal ideation     Suicidal ideations     Intellectual disability     Bipolar affective disorder, currently depressed, moderate (H)     Borderline personality disorder (H)     Morbid obesity (H)     Unspecified mood (affective) disorder (H)     Chronic constipation     History of suicide attempt     Hyperhidrosis     Major depressive disorder, recurrent, in full remission (H)     Vitamin D deficiency       Past Surgical History:     Past Surgical History:   Procedure Laterality Date     APPENDECTOMY         Family History:     Family History   Problem Relation Age of Onset     Diabetes Type 1 Father      Cancer Paternal Grandfather        Social History:    reports that she has quit smoking. Her smoking use included cigarettes and vaping device. She has a 1.3 pack-year  smoking history. She has been exposed to tobacco smoke. She has never used smokeless tobacco. She reports that she does not drink alcohol and does not use drugs.    Review of Systems:   12 systems are reviewed negative except for in HPI.    Meds:     Current Outpatient Medications:      acetaminophen (TYLENOL) 325 MG tablet, Take 650 mg by mouth every 6 hours as needed for mild pain, Disp: , Rfl:      albuterol (PROAIR HFA/PROVENTIL HFA/VENTOLIN HFA) 108 (90 Base) MCG/ACT inhaler, Inhale 1 puff into the lungs 4 times daily., Disp: , Rfl:      ANTIFUNGAL 2 % external powder, Apply 2 g topically as needed., Disp: , Rfl:      ARIPiprazole lauroxil ER (ARISTADA) 882 MG/3.2ML intra-muscular, Inject 882 mg into the muscle every 28 days On the 22nd of each month, Disp: , Rfl:      benztropine (COGENTIN) 1 MG tablet, Take 1 mg by mouth daily., Disp: , Rfl:      bisacodyl (DULCOLAX) 5 MG EC tablet, Take 1 tablet (5 mg) by mouth daily as needed for constipation, Disp: 30 tablet, Rfl: 0     cetirizine (ZYRTEC) 10 MG tablet, Take 1 tablet by mouth daily., Disp: , Rfl:      cloNIDine (CATAPRES) 0.1 MG tablet, Take 1 tablet by mouth daily, Disp: , Rfl:      clotrimazole (LOTRIMIN) 1 % external cream, Apply topically 2 times daily., Disp: , Rfl:      cyclobenzaprine (FLEXERIL) 5 MG tablet, Take 5 mg by mouth 2 times daily as needed for muscle spasms., Disp: , Rfl:      dicyclomine (BENTYL) 20 MG tablet, Take 20 mg by mouth 2 times daily as needed (dyspepsia), Disp: , Rfl:      divalproex sodium extended-release (DEPAKOTE ER) 250 MG 24 hr tablet, Take 500 mg by mouth daily, Disp: , Rfl:      docusate sodium (COLACE) 50 MG capsule, Take 100 mg by mouth 2 times daily, Disp: , Rfl:      doxylamine (UNISOM) 25 MG TABS tablet, Take 1 tablet (25 mg) by mouth at bedtime, Disp: 30 tablet, Rfl: 0     famotidine (PEPCID) 20 MG tablet, Take 20 mg by mouth daily, Disp: , Rfl:      fluconazole (DIFLUCAN) 150 MG tablet, Take 1 tablet by mouth 2  times daily., Disp: , Rfl:      FLUoxetine (PROZAC) 40 MG capsule, Take 80 mg by mouth daily, Disp: , Rfl:      fluticasone (FLONASE) 50 MCG/ACT nasal spray, Spray 2 sprays in nostril daily., Disp: , Rfl:      guaiFENesin (MUCINEX) 600 MG 12 hr tablet, Take 1,200 mg by mouth as needed., Disp: , Rfl:      hydrOXYzine (ATARAX) 50 MG tablet, Take 50 mg by mouth 2 times daily as needed for anxiety, Disp: , Rfl:      lactase (LACTAID) 3000 UNIT tablet, Take 1 tablet (3,000 Units) by mouth 3 times daily as needed for indigestion, Disp: 30 tablet, Rfl: 1     loperamide (IMODIUM A-D) 2 MG tablet, Take 2 tablets (4 mg) in the morning.  Take 1 tablet (2 mg) with every loose stool.  Do not exceed 8 tablets in a day., Disp: 30 tablet, Rfl: 0     medroxyPROGESTERone (DEPO-PROVERA) 150 MG/ML syringe, Inject 150 mg into the muscle every 3 months., Disp: , Rfl:      multivitamin w/minerals (MULTI-VITAMIN) tablet, Take 1 tablet by mouth daily., Disp: , Rfl:      OLANZapine zydis (ZYPREXA) 5 MG ODT, Take 1 tablet (5 mg) by mouth At Bedtime, Disp: 30 tablet, Rfl: 0     omeprazole (PRILOSEC) 40 MG DR capsule, Take 40 mg by mouth daily., Disp: , Rfl:      ondansetron (ZOFRAN) 4 MG tablet, Take 1 tablet (4 mg) by mouth every 8 hours as needed, Disp: 15 tablet, Rfl: 0     pantoprazole (PROTONIX) 40 MG EC tablet, Take 40 mg by mouth daily, Disp: , Rfl:      phenazopyridine (PYRIDIUM) 100 MG tablet, Take 1 tablet (100 mg) by mouth 3 times daily as needed for urinary tract discomfort, Disp: 6 tablet, Rfl: 0     polyethylene glycol (MIRALAX) 17 GM/Dose powder, Take 1 Capful by mouth daily as needed for constipation., Disp: , Rfl:      promethazine (PHENERGAN) 12.5 MG tablet, Take 12.5 mg by mouth every 4 hours, Disp: , Rfl:      senna-docusate (SENOKOT-S/PERICOLACE) 8.6-50 MG tablet, Take 1 tablet by mouth 2 times daily, Disp: , Rfl:      sodium chloride 0.65 % nasal spray, Spray 1 spray in nostril daily as needed., Disp: , Rfl:       "traZODone (DESYREL) 50 MG tablet, Take 1 tablet (50 mg) by mouth at bedtime, Disp: 30 tablet, Rfl: 0     Vitamin D, Cholecalciferol, 25 MCG (1000 UT) TABS, Take 1,000 Units by mouth daily , Disp: , Rfl:      levothyroxine (SYNTHROID/LEVOTHROID) 25 MCG tablet, Take 1 tablet (25 mcg) by mouth daily for 30 days, Disp: 30 tablet, Rfl: 0  No current facility-administered medications for this visit.     Allergies:   Penicillins    Objective:      Physical Exam  111.8 kg (246 lb 6.4 oz)  1.6 m (5' 3\")  Body mass index is 43.65 kg/m .  /60 (BP Location: Left arm, Patient Position: Sitting, Cuff Size: Adult Large)   Pulse 69   Resp 18   Ht 1.6 m (5' 3\")   Wt 111.8 kg (246 lb 6.4 oz)   SpO2 100%   BMI 43.65 kg/m      General Appearance:   Awake, Alert, No acute distress.   HEENT:  Pupil equal, reactive to light. No scleral icterus; the mucous membranes were moist. No oral ulcers or thrush.    Neck: No cervical bruits. No JVD. No thyromegaly. No lymph node enlargement or tenderness.   Chest: The spine was straight. The chest was symmetric.   Lungs:   Respirations unlabored. Lungs are clear to auscultation. No crackles. No wheezing.   Cardiovascular:   RRR, normal first and second heart sounds with no murmurs. No rubs or gallops.    Abdomen:  Obese.  Soft. No tenderness. Non-distended. Bowels sounds are present   Extremities: Equal posterior tibial pulses. No leg edema.   Skin: No rashes or ulcers. Warm, Dry.   Musculoskeletal: No tenderness. No deformity.   Neurologic: Mood and affect are appropriate. No focal deficits.         EKG:  Personally reivewed  Normal sinus rhythm  Normal ECG    Cardiac Imaging Studies       Lab Review   Lab Results   Component Value Date     10/16/2024     07/10/2021    CO2 23 10/16/2024    CO2 19 10/03/2022    CO2 25 07/10/2021    BUN 8.4 10/16/2024    BUN 15 10/03/2022    BUN 8 07/10/2021     Lab Results   Component Value Date    WBC 6.0 10/16/2024    WBC 12.1 07/09/2021    " HGB 11.8 10/16/2024    HGB 12.6 07/10/2021    HCT 36.8 10/16/2024    HCT 41.0 07/09/2021    MCV 83 10/16/2024    MCV 86 07/09/2021     10/16/2024     07/09/2021     Lab Results   Component Value Date    CHOL 147 12/09/2022    CHOL 203 11/20/2019    TRIG 201 12/09/2022    TRIG 180 11/20/2019    HDL 39 12/09/2022    HDL 55 11/20/2019    LDL 68 12/09/2022     11/20/2019     LDL Cholesterol Calculated   Date Value Ref Range Status   12/09/2022 68 <=100 mg/dL Final   11/20/2019 112 (H) <100 mg/dL Final     Comment:     Above desirable:  100-129 mg/dl  Borderline High:  130-159 mg/dL  High:             160-189 mg/dL  Very high:       >189 mg/dl       Lab Results   Component Value Date    TSH 5.12 06/11/2024    TSH 1.62 02/14/2022    TSH 2.17 12/12/2019                   Thank you for allowing me to participate in the care of your patient.      Sincerely,     Fidel Neely MD     [facility]  cc:   Gaby Arambula PA-C  [facility]  [address] [place] [address]

## 2024-10-17 NOTE — PROGRESS NOTES
Click to link to [facility] HEART CARE NOTE    Thank you, Dr. Arambula, for asking me to see Sadaf Ross in consultation at [facility] (formerly [facility] to evaluate syncope.      Assessment/Plan:   Syncope: The patient was evaluated at emergency room yesterday for syncope.  There is no significant findings at emergency room yesterday.  Her ECG showed normal sinus rhythm, no ischemic ST-T change, no conduction abnormality.  Laboratory test was not impressive.  The patient is referred to cardiology clinic for further evaluation today.  I was not able to see the patient yet.  She complained that chest pain, dizziness, not feeling well.  ECG was performed that showed normal sinus rhythm, normal ECG.  Clinically, she passed out for 2 minutes.  I presented the room.  The patient woke up with chest pain, no shortness of breath.  She states that she had a medical history of a seizure disorder.  Quickly, she passed out again with muscle jerking.  It lasted about after 5 minutes.  Ambulance arrived and bring the patient to emergency room for further evaluation and management.  During her syncopal episode, another ECG showed normal sinus rhythm, normal ECG.  Her blood pressure was 143/66 mmHg.  Discussed the case with ER physician.    Thank you for the opportunity to be involved in the care of Sadaf Ross. If you have any questions, please feel free to contact me.      Much or all of the text in this note was generated through the use of Dragon Dictate voice-to-text software. Errors in spelling or words which seem out of context are unintentional.   Sound alike errors, in particular, may have escaped editing.       History of Present Illness:   It is my pleasure to see Sadaf Ross at the [facility] for evaluation of New Patient. Sadaf Ross is a 24 year old female with a medical history of morbid obesity with BMI 43.65, anxiety and  depression, history of seizure disorder.    The patient is referred to cardiology clinic for evaluation of her syncope.  She had a syncopal episode yesterday leading to ER visit.  Her ECG was normal.  Her laboratory test was not significant.  The patient is referred to have a cardiology evaluation today.    Before I was able to see her, she complains of dizziness, mild chest pain, not feeling well.  He passed out after when she had ECG.  Her ECG showed normal sinus rhythm, normal ECG.  She was able to walk up in 2 to 3 minutes.  She states that she had a medical history of a seizure disorder.  Shortly, she passed out again with muscle jerking which lasted about 5 minutes until ambulance staff arrived.  She woke up again.  The patient is brought to emergency room for the further evaluation and management.  ER physician is called and I discussed the case.    Past Medical History:     Patient Active Problem List   Diagnosis    MENTAL HEALTH    Suicidal ideation    Suicidal ideations    Intellectual disability    Bipolar affective disorder, currently depressed, moderate (H)    Borderline personality disorder (H)    Morbid obesity (H)    Unspecified mood (affective) disorder (H)    Chronic constipation    History of suicide attempt    Hyperhidrosis    Major depressive disorder, recurrent, in full remission (H)    Vitamin D deficiency       Past Surgical History:     Past Surgical History:   Procedure Laterality Date    APPENDECTOMY         Family History:     Family History   Problem Relation Age of Onset    Diabetes Type 1 Father     Cancer Paternal Grandfather        Social History:    reports that she has quit smoking. Her smoking use included cigarettes and vaping device. She has a 1.3 pack-year smoking history. She has been exposed to tobacco smoke. She has never used smokeless tobacco. She reports that she does not drink alcohol and does not use drugs.    Review of Systems:   12 systems are reviewed negative except  for in HPI.    Meds:     Current Outpatient Medications:     acetaminophen (TYLENOL) 325 MG tablet, Take 650 mg by mouth every 6 hours as needed for mild pain, Disp: , Rfl:     albuterol (PROAIR HFA/PROVENTIL HFA/VENTOLIN HFA) 108 (90 Base) MCG/ACT inhaler, Inhale 1 puff into the lungs 4 times daily., Disp: , Rfl:     ANTIFUNGAL 2 % external powder, Apply 2 g topically as needed., Disp: , Rfl:     ARIPiprazole lauroxil ER (ARISTADA) 882 MG/3.2ML intra-muscular, Inject 882 mg into the muscle every 28 days On the 22nd of each month, Disp: , Rfl:     benztropine (COGENTIN) 1 MG tablet, Take 1 mg by mouth daily., Disp: , Rfl:     bisacodyl (DULCOLAX) 5 MG EC tablet, Take 1 tablet (5 mg) by mouth daily as needed for constipation, Disp: 30 tablet, Rfl: 0    cetirizine (ZYRTEC) 10 MG tablet, Take 1 tablet by mouth daily., Disp: , Rfl:     cloNIDine (CATAPRES) 0.1 MG tablet, Take 1 tablet by mouth daily, Disp: , Rfl:     clotrimazole (LOTRIMIN) 1 % external cream, Apply topically 2 times daily., Disp: , Rfl:     cyclobenzaprine (FLEXERIL) 5 MG tablet, Take 5 mg by mouth 2 times daily as needed for muscle spasms., Disp: , Rfl:     dicyclomine (BENTYL) 20 MG tablet, Take 20 mg by mouth 2 times daily as needed (dyspepsia), Disp: , Rfl:     divalproex sodium extended-release (DEPAKOTE ER) 250 MG 24 hr tablet, Take 500 mg by mouth daily, Disp: , Rfl:     docusate sodium (COLACE) 50 MG capsule, Take 100 mg by mouth 2 times daily, Disp: , Rfl:     doxylamine (UNISOM) 25 MG TABS tablet, Take 1 tablet (25 mg) by mouth at bedtime, Disp: 30 tablet, Rfl: 0    famotidine (PEPCID) 20 MG tablet, Take 20 mg by mouth daily, Disp: , Rfl:     fluconazole (DIFLUCAN) 150 MG tablet, Take 1 tablet by mouth 2 times daily., Disp: , Rfl:     FLUoxetine (PROZAC) 40 MG capsule, Take 80 mg by mouth daily, Disp: , Rfl:     fluticasone (FLONASE) 50 MCG/ACT nasal spray, Spray 2 sprays in nostril daily., Disp: , Rfl:     guaiFENesin (MUCINEX) 600 MG 12 hr  tablet, Take 1,200 mg by mouth as needed., Disp: , Rfl:     hydrOXYzine (ATARAX) 50 MG tablet, Take 50 mg by mouth 2 times daily as needed for anxiety, Disp: , Rfl:     lactase (LACTAID) 3000 UNIT tablet, Take 1 tablet (3,000 Units) by mouth 3 times daily as needed for indigestion, Disp: 30 tablet, Rfl: 1    loperamide (IMODIUM A-D) 2 MG tablet, Take 2 tablets (4 mg) in the morning.  Take 1 tablet (2 mg) with every loose stool.  Do not exceed 8 tablets in a day., Disp: 30 tablet, Rfl: 0    medroxyPROGESTERone (DEPO-PROVERA) 150 MG/ML syringe, Inject 150 mg into the muscle every 3 months., Disp: , Rfl:     multivitamin w/minerals (MULTI-VITAMIN) tablet, Take 1 tablet by mouth daily., Disp: , Rfl:     OLANZapine zydis (ZYPREXA) 5 MG ODT, Take 1 tablet (5 mg) by mouth At Bedtime, Disp: 30 tablet, Rfl: 0    omeprazole (PRILOSEC) 40 MG DR capsule, Take 40 mg by mouth daily., Disp: , Rfl:     ondansetron (ZOFRAN) 4 MG tablet, Take 1 tablet (4 mg) by mouth every 8 hours as needed, Disp: 15 tablet, Rfl: 0    pantoprazole (PROTONIX) 40 MG EC tablet, Take 40 mg by mouth daily, Disp: , Rfl:     phenazopyridine (PYRIDIUM) 100 MG tablet, Take 1 tablet (100 mg) by mouth 3 times daily as needed for urinary tract discomfort, Disp: 6 tablet, Rfl: 0    polyethylene glycol (MIRALAX) 17 GM/Dose powder, Take 1 Capful by mouth daily as needed for constipation., Disp: , Rfl:     promethazine (PHENERGAN) 12.5 MG tablet, Take 12.5 mg by mouth every 4 hours, Disp: , Rfl:     senna-docusate (SENOKOT-S/PERICOLACE) 8.6-50 MG tablet, Take 1 tablet by mouth 2 times daily, Disp: , Rfl:     sodium chloride 0.65 % nasal spray, Spray 1 spray in nostril daily as needed., Disp: , Rfl:     traZODone (DESYREL) 50 MG tablet, Take 1 tablet (50 mg) by mouth at bedtime, Disp: 30 tablet, Rfl: 0    Vitamin D, Cholecalciferol, 25 MCG (1000 UT) TABS, Take 1,000 Units by mouth daily , Disp: , Rfl:     levothyroxine (SYNTHROID/LEVOTHROID) 25 MCG tablet, Take 1  "tablet (25 mcg) by mouth daily for 30 days, Disp: 30 tablet, Rfl: 0  No current facility-administered medications for this visit.     Allergies:   Penicillins    Objective:      Physical Exam  111.8 kg (246 lb 6.4 oz)  1.6 m (5' 3\")  Body mass index is 43.65 kg/m .  /60 (BP Location: Left arm, Patient Position: Sitting, Cuff Size: Adult Large)   Pulse 69   Resp 18   Ht 1.6 m (5' 3\")   Wt 111.8 kg (246 lb 6.4 oz)   SpO2 100%   BMI 43.65 kg/m      General Appearance:   Awake, Alert, No acute distress.   HEENT:  Pupil equal, reactive to light. No scleral icterus; the mucous membranes were moist. No oral ulcers or thrush.    Neck: No cervical bruits. No JVD. No thyromegaly. No lymph node enlargement or tenderness.   Chest: The spine was straight. The chest was symmetric.   Lungs:   Respirations unlabored. Lungs are clear to auscultation. No crackles. No wheezing.   Cardiovascular:   RRR, normal first and second heart sounds with no murmurs. No rubs or gallops.    Abdomen:  Obese.  Soft. No tenderness. Non-distended. Bowels sounds are present   Extremities: Equal posterior tibial pulses. No leg edema.   Skin: No rashes or ulcers. Warm, Dry.   Musculoskeletal: No tenderness. No deformity.   Neurologic: Mood and affect are appropriate. No focal deficits.         EKG:  Personally reivewed  Normal sinus rhythm  Normal ECG    Cardiac Imaging Studies       Lab Review   Lab Results   Component Value Date     10/16/2024     07/10/2021    CO2 23 10/16/2024    CO2 19 10/03/2022    CO2 25 07/10/2021    BUN 8.4 10/16/2024    BUN 15 10/03/2022    BUN 8 07/10/2021     Lab Results   Component Value Date    WBC 6.0 10/16/2024    WBC 12.1 07/09/2021    HGB 11.8 10/16/2024    HGB 12.6 07/10/2021    HCT 36.8 10/16/2024    HCT 41.0 07/09/2021    MCV 83 10/16/2024    MCV 86 07/09/2021     10/16/2024     07/09/2021     Lab Results   Component Value Date    CHOL 147 12/09/2022    CHOL 203 11/20/2019    " TRIG 201 12/09/2022    TRIG 180 11/20/2019    HDL 39 12/09/2022    HDL 55 11/20/2019    LDL 68 12/09/2022     11/20/2019     LDL Cholesterol Calculated   Date Value Ref Range Status   12/09/2022 68 <=100 mg/dL Final   11/20/2019 112 (H) <100 mg/dL Final     Comment:     Above desirable:  100-129 mg/dl  Borderline High:  130-159 mg/dL  High:             160-189 mg/dL  Very high:       >189 mg/dl       Lab Results   Component Value Date    TSH 5.12 06/11/2024    TSH 1.62 02/14/2022    TSH 2.17 12/12/2019

## 2024-10-17 NOTE — ED NOTES
Pt discharged to home ambulatory at 1815. All questions answered. Pt expressed understanding of info. Pt will wait in triage for her cab.

## 2024-10-17 NOTE — PROGRESS NOTES
"Patient has been participating in some groups. Required some redirection for unproductive discussion of her previous hospital admissions. Denies any current SI/SIB/HI/AVH. Smiled occasionally in conversation. Stated that she had to stay up late at night talking with staff \"so I wouldn't hurt myself,\" smiled a bit when saying this.       11/25/19 1318   Behavioral Health   Hallucinations denies / not responding to hallucinations   Thinking distractable   Orientation person: oriented;place: oriented;date: oriented;time: oriented   Memory baseline memory   Insight poor   Judgement impaired   Eye Contact at examiner   Affect blunted, flat   Mood mood is calm;depressed   Physical Appearance/Attire attire appropriate to age and situation   Hygiene well groomed   Suicidality other (see comments)  (denies)   1. Wish to be Dead (Recent) No   2. Non-Specific Active Suicidal Thoughts (Recent) No   Self Injury other (see comment)  (no current thoughts)   Elopement   (no concerns)   Activity other (see comment)  (goi8ng to groups)   Speech clear;coherent   Medication Sensitivity no stated side effects;no observed side effects   Psychomotor / Gait balanced;steady   Activities of Daily Living   Hygiene/Grooming independent   Oral Hygiene independent   Dress independent   Laundry unable to complete   Room Organization independent   Grady Heredia on 11/25/2019 at 1:23 PM    " stiches removal

## 2024-10-18 ENCOUNTER — APPOINTMENT (OUTPATIENT)
Dept: NEUROLOGY | Facility: HOSPITAL | Age: 24
DRG: 101 | End: 2024-10-18
Attending: STUDENT IN AN ORGANIZED HEALTH CARE EDUCATION/TRAINING PROGRAM
Payer: MEDICARE

## 2024-10-18 ENCOUNTER — APPOINTMENT (OUTPATIENT)
Dept: CARDIOLOGY | Facility: HOSPITAL | Age: 24
DRG: 101 | End: 2024-10-18
Payer: MEDICARE

## 2024-10-18 VITALS
WEIGHT: 255 LBS | HEART RATE: 55 BPM | TEMPERATURE: 98.3 F | OXYGEN SATURATION: 96 % | SYSTOLIC BLOOD PRESSURE: 119 MMHG | DIASTOLIC BLOOD PRESSURE: 55 MMHG | BODY MASS INDEX: 45.18 KG/M2 | HEIGHT: 63 IN | RESPIRATION RATE: 16 BRPM

## 2024-10-18 LAB
ANION GAP SERPL CALCULATED.3IONS-SCNC: 12 MMOL/L (ref 7–15)
BUN SERPL-MCNC: 9.1 MG/DL (ref 6–20)
CALCIUM SERPL-MCNC: 8.7 MG/DL (ref 8.8–10.4)
CHLORIDE SERPL-SCNC: 107 MMOL/L (ref 98–107)
CREAT SERPL-MCNC: 0.93 MG/DL (ref 0.51–0.95)
EGFRCR SERPLBLD CKD-EPI 2021: 87 ML/MIN/1.73M2
GLUCOSE SERPL-MCNC: 87 MG/DL (ref 70–99)
HCO3 SERPL-SCNC: 23 MMOL/L (ref 22–29)
HOLD SPECIMEN: NORMAL
LVEF ECHO: NORMAL
POTASSIUM SERPL-SCNC: 4.1 MMOL/L (ref 3.4–5.3)
SODIUM SERPL-SCNC: 142 MMOL/L (ref 135–145)

## 2024-10-18 PROCEDURE — 255N000002 HC RX 255 OP 636

## 2024-10-18 PROCEDURE — 99207 PR APP CREDIT; MD BILLING SHARED VISIT: CPT

## 2024-10-18 PROCEDURE — 250N000011 HC RX IP 250 OP 636: Performed by: INTERNAL MEDICINE

## 2024-10-18 PROCEDURE — 99207 PR NO BILLABLE SERVICE THIS VISIT: CPT | Performed by: FAMILY MEDICINE

## 2024-10-18 PROCEDURE — 999N000208 ECHOCARDIOGRAM COMPLETE

## 2024-10-18 PROCEDURE — 80048 BASIC METABOLIC PNL TOTAL CA: CPT | Performed by: INTERNAL MEDICINE

## 2024-10-18 PROCEDURE — 95819 EEG AWAKE AND ASLEEP: CPT | Mod: 26 | Performed by: PSYCHIATRY & NEUROLOGY

## 2024-10-18 PROCEDURE — 99222 1ST HOSP IP/OBS MODERATE 55: CPT | Mod: FS | Performed by: INTERNAL MEDICINE

## 2024-10-18 PROCEDURE — 99222 1ST HOSP IP/OBS MODERATE 55: CPT | Mod: 95 | Performed by: REGISTERED NURSE

## 2024-10-18 PROCEDURE — 250N000013 HC RX MED GY IP 250 OP 250 PS 637: Performed by: INTERNAL MEDICINE

## 2024-10-18 PROCEDURE — 95819 EEG AWAKE AND ASLEEP: CPT

## 2024-10-18 PROCEDURE — 96376 TX/PRO/DX INJ SAME DRUG ADON: CPT

## 2024-10-18 PROCEDURE — 99222 1ST HOSP IP/OBS MODERATE 55: CPT | Performed by: STUDENT IN AN ORGANIZED HEALTH CARE EDUCATION/TRAINING PROGRAM

## 2024-10-18 PROCEDURE — 120N000001 HC R&B MED SURG/OB

## 2024-10-18 PROCEDURE — G0378 HOSPITAL OBSERVATION PER HR: HCPCS

## 2024-10-18 PROCEDURE — 36415 COLL VENOUS BLD VENIPUNCTURE: CPT | Performed by: INTERNAL MEDICINE

## 2024-10-18 PROCEDURE — 99239 HOSP IP/OBS DSCHRG MGMT >30: CPT | Performed by: FAMILY MEDICINE

## 2024-10-18 PROCEDURE — 93306 TTE W/DOPPLER COMPLETE: CPT | Mod: 26 | Performed by: STUDENT IN AN ORGANIZED HEALTH CARE EDUCATION/TRAINING PROGRAM

## 2024-10-18 PROCEDURE — 250N000013 HC RX MED GY IP 250 OP 250 PS 637: Performed by: FAMILY MEDICINE

## 2024-10-18 RX ORDER — FAMOTIDINE 20 MG/1
20 TABLET, FILM COATED ORAL AT BEDTIME
Status: DISCONTINUED | OUTPATIENT
Start: 2024-10-18 | End: 2024-10-18 | Stop reason: HOSPADM

## 2024-10-18 RX ORDER — POLYETHYLENE GLYCOL 3350 17 G/17G
17 POWDER, FOR SOLUTION ORAL DAILY PRN
Status: DISCONTINUED | OUTPATIENT
Start: 2024-10-18 | End: 2024-10-18 | Stop reason: HOSPADM

## 2024-10-18 RX ORDER — PROMETHAZINE HYDROCHLORIDE 12.5 MG/1
12.5 TABLET ORAL EVERY 6 HOURS PRN
Status: ON HOLD
Start: 2024-10-18

## 2024-10-18 RX ORDER — CETIRIZINE HYDROCHLORIDE 10 MG/1
10 TABLET ORAL DAILY
Status: DISCONTINUED | OUTPATIENT
Start: 2024-10-18 | End: 2024-10-18 | Stop reason: HOSPADM

## 2024-10-18 RX ORDER — LEVOTHYROXINE SODIUM 25 UG/1
25 TABLET ORAL DAILY
Status: DISCONTINUED | OUTPATIENT
Start: 2024-10-18 | End: 2024-10-18 | Stop reason: HOSPADM

## 2024-10-18 RX ORDER — DICYCLOMINE HCL 20 MG
20 TABLET ORAL 2 TIMES DAILY
Status: DISCONTINUED | OUTPATIENT
Start: 2024-10-18 | End: 2024-10-18 | Stop reason: HOSPADM

## 2024-10-18 RX ORDER — HYDROXYZINE HYDROCHLORIDE 50 MG/1
50 TABLET, FILM COATED ORAL 2 TIMES DAILY PRN
Status: DISCONTINUED | OUTPATIENT
Start: 2024-10-18 | End: 2024-10-18 | Stop reason: HOSPADM

## 2024-10-18 RX ORDER — DIVALPROEX SODIUM 250 MG/1
250 TABLET, FILM COATED, EXTENDED RELEASE ORAL 2 TIMES DAILY
Status: DISCONTINUED | OUTPATIENT
Start: 2024-10-18 | End: 2024-10-18 | Stop reason: HOSPADM

## 2024-10-18 RX ORDER — CLONIDINE HYDROCHLORIDE 0.1 MG/1
0.1 TABLET ORAL DAILY
Status: DISCONTINUED | OUTPATIENT
Start: 2024-10-18 | End: 2024-10-18 | Stop reason: HOSPADM

## 2024-10-18 RX ORDER — BENZTROPINE MESYLATE 0.5 MG/1
1 TABLET ORAL 2 TIMES DAILY
Status: DISCONTINUED | OUTPATIENT
Start: 2024-10-18 | End: 2024-10-18 | Stop reason: HOSPADM

## 2024-10-18 RX ORDER — DOCUSATE SODIUM 100 MG/1
100 CAPSULE, LIQUID FILLED ORAL 2 TIMES DAILY
Status: DISCONTINUED | OUTPATIENT
Start: 2024-10-18 | End: 2024-10-18 | Stop reason: HOSPADM

## 2024-10-18 RX ORDER — ONDANSETRON 4 MG/1
8 TABLET, FILM COATED ORAL EVERY 8 HOURS PRN
Status: DISCONTINUED | OUTPATIENT
Start: 2024-10-18 | End: 2024-10-18 | Stop reason: HOSPADM

## 2024-10-18 RX ORDER — TRAZODONE HYDROCHLORIDE 50 MG/1
100 TABLET, FILM COATED ORAL AT BEDTIME
Status: DISCONTINUED | OUTPATIENT
Start: 2024-10-18 | End: 2024-10-18 | Stop reason: HOSPADM

## 2024-10-18 RX ORDER — OLANZAPINE 5 MG/1
5 TABLET, ORALLY DISINTEGRATING ORAL AT BEDTIME
Status: DISCONTINUED | OUTPATIENT
Start: 2024-10-18 | End: 2024-10-18 | Stop reason: HOSPADM

## 2024-10-18 RX ADMIN — LEVOTHYROXINE SODIUM 25 MCG: 0.03 TABLET ORAL at 13:33

## 2024-10-18 RX ADMIN — PERFLUTREN 2 ML: 6.52 INJECTION, SUSPENSION INTRAVENOUS at 13:16

## 2024-10-18 RX ADMIN — DIVALPROEX SODIUM 250 MG: 250 TABLET, FILM COATED, EXTENDED RELEASE ORAL at 13:34

## 2024-10-18 RX ADMIN — PANTOPRAZOLE SODIUM 40 MG: 40 TABLET, DELAYED RELEASE ORAL at 13:35

## 2024-10-18 RX ADMIN — FLUOXETINE HYDROCHLORIDE 80 MG: 20 CAPSULE ORAL at 13:33

## 2024-10-18 RX ADMIN — CETIRIZINE HYDROCHLORIDE 10 MG: 10 TABLET, FILM COATED ORAL at 13:33

## 2024-10-18 RX ADMIN — CLONIDINE HYDROCHLORIDE 0.1 MG: 0.1 TABLET ORAL at 13:33

## 2024-10-18 RX ADMIN — ONDANSETRON 4 MG: 2 INJECTION INTRAMUSCULAR; INTRAVENOUS at 08:25

## 2024-10-18 RX ADMIN — DICYCLOMINE HYDROCHLORIDE 20 MG: 20 TABLET ORAL at 13:34

## 2024-10-18 RX ADMIN — BENZTROPINE MESYLATE 1 MG: 0.5 TABLET ORAL at 13:34

## 2024-10-18 ASSESSMENT — ACTIVITIES OF DAILY LIVING (ADL)
ADLS_ACUITY_SCORE: 36
DEPENDENT_IADLS:: MEDICATION MANAGEMENT;MONEY MANAGEMENT;TRANSPORTATION
ADLS_ACUITY_SCORE: 36

## 2024-10-18 NOTE — DISCHARGE SUMMARY
"[facility]  Hospitalist Discharge Summary      Date of Admission:  10/17/2024  Date of Discharge:  10/18/2024  Discharging Provider: Kelley Campos MD  Discharge Service: Hospitalist Service    Discharge Diagnoses     Witnessed spells.  Nonepileptiform shaking  Bipolar disorder  Anxiety disorder  Acute on chronic suicidal ideation  Hypothyroidism  Mild intermittent asthma  ADHD  Chronic abdominal pain   Chronic constipation  GERD  Intellectual disability    Clinically Significant Risk Factors     # Severe Obesity: Estimated body mass index is 45.17 kg/m  as calculated from the following:    Height as of this encounter: 1.6 m (5' 3\").    Weight as of this encounter: 115.7 kg (255 lb).       Follow-ups Needed After Discharge   Follow-up Appointments     Follow-up and recommended labs and tests       Follow up with primary care provider, [facility], within 7-14 days to   evaluate medication change and for hospital follow- up.    Follow up with your psychiatric provider at [facility] in 1-2 weeks.                  Discharge Disposition   Discharged to home  Condition at discharge: Stable    Hospital Course     24-year-old female with history of depression, bipolar disorder, intellectual disability who is her own guardian and lives with her stepmom but previously in group home setting came into the emergency room department for further workup and evaluation of witnessed spells.  Patient been seen in the ED for spells described as near syncope.  Due to sinus bradycardia which was mild was referred to rapid access clinic where she had witnessed episodes of altered consciousness with some shaking behavior on 2 separate occasion was brought by ambulance to the ED and then admitted.    Heart rate remained normal on telemetry.  Seen by cardiology with echocardiogram normal and no further cardiac workup recommended.  Seen by neurology with normal EEG and no antiepileptic medication recommended.  Patient " described active suicidal ideation with plan of taking medications to kill herself to the neurologist.  She was placed on one-to-one and inpatient psychiatric consultation was requested.  Seen by psychiatric provider.  At the time patient denied suicidality and suicidal ideations appeared chronic in nature and she denied plan.  Psych stated that patient could discharge when medically ready and follow-up with her previously scheduled psychiatric provider on October 21     Patient remained stable while here with no witnessed spells.  Concern for ongoing psychiatric illness affecting patient's presentations and symptoms.  No change in her medication regimen was made and she was discharged in good condition    Consultations This Hospital Stay   NEUROLOGY IP CONSULT  CARDIOLOGY IP CONSULT  CARE MANAGEMENT / SOCIAL WORK IP CONSULT  PSYCHIATRY IP CONSULT    Code Status   Full Code    Time Spent on this Encounter   I, Kelley Campos MD, personally saw the patient today and spent greater than 30 minutes discharging this patient.       Kelley Campos MD  [facility] EXTENDED RECOVERY AND SHORT STAY  [address] [place] [id number]  Phone: [phone]  Fax: [phone]  ______________________________________________________________________    Physical Exam   Vital Signs: Temp: 98.3  F (36.8  C) Temp src: Oral BP: 119/55 Pulse: 55   Resp: 16 SpO2: 96 % O2 Device: None (Room air)    Weight: 255 lbs 0 oz  --Please see progress note from earlier today.       Primary Care Physician   [facility]    Discharge Orders      Reason for your hospital stay    ---Spells of abnormal behavior     Follow-up and recommended labs and tests     Follow up with primary care provider, [facility], within 7-14 days to evaluate medication change and for hospital follow- up.    Follow up with your psychiatric provider at [facility] in 1-2 weeks.     Activity    Your activity upon discharge: activity as tolerated     Diet     Follow this diet upon discharge: Current Diet:Orders Placed This Encounter      Combination Diet Regular Diet Adult; No Caffeine Diet       Significant Results and Procedures   Most Recent 3 CBC's:  Recent Labs   Lab Test 10/17/24  1619 10/16/24  1540 07/15/24  1309   WBC 7.1 6.0 6.0   HGB 11.5* 11.8 12.7   MCV 84 83 81    188 217     Most Recent 3 BMP's:  Recent Labs   Lab Test 10/18/24  0529 10/17/24  1619 10/16/24  1540    143 143   POTASSIUM 4.1 4.5 4.5   CHLORIDE 107 108* 108*   CO2 23 25 23   BUN 9.1 7.0 8.4   CR 0.93 0.81 0.81   ANIONGAP 12 10 12   SAMIR 8.7* 9.0 9.3   GLC 87 88 94   ,   Results for orders placed or performed during the hospital encounter of 10/17/24   Echocardiogram Complete     Value    LVEF  60-65%    Narrative    [id number]  [id number]  [id number]  [id number]^SERGIO^CRISTY^[place] [id number]     Name: ARIANE TILLMAN  MRN: [id number]  : 2000  Study Date: 10/18/2024 12:52 PM  Age: 24 yrs  Gender: Female  Patient Location: [facility]  Reason For Study: Syncope  Ordering Physician: CRISTY HILL  Performed By: TERELL     BSA: 2.1 m2  Height: 63 in  Weight: 255 lb  HR: 62  BP: 115/55 mmHg  ______________________________________________________________________________  Procedure  Complete Portable Echo Adult. Definity (NDC #89686-147) given intravenously.  Adequate quality two-dimensional was performed and interpreted. There is no  comparison study available.  ______________________________________________________________________________  Interpretation Summary     The left ventricle is normal in size. There is normal left ventricular wall  thickness.  Left ventricular systolic function is normal. The visual ejection fraction is  60-65%. No regional wall motion abnormalities noted.  Left ventricular diastolic function is normal.     The right ventricle is normal in size and function.  Normal left atrial size. Right atrial size is  normal.  IVC diameter <2.1 cm collapsing >50% with sniff suggests a normal RA pressure  of 3 mmHg.  There is no comparison study available.  ______________________________________________________________________________  Left Ventricle  The left ventricle is normal in size. There is normal left ventricular wall  thickness. Left ventricular systolic function is normal. The visual ejection  fraction is 60-65%. Left ventricular diastolic function is normal. No regional  wall motion abnormalities noted.     Right Ventricle  The right ventricle is normal in size and function.     Atria  Normal left atrial size. Right atrial size is normal.     Mitral Valve  Mitral valve leaflets appear normal. There is trace mitral regurgitation.  There is no mitral valve stenosis.     Tricuspid Valve  Tricuspid valve leaflets appear normal. There is trace tricuspid  regurgitation. Right ventricular systolic pressure could not be approximated  due to inadequate tricuspid regurgitation.     Aortic Valve  The aortic valve is trileaflet. Aortic valve leaflets appear normal. No aortic  regurgitation is present. No aortic stenosis is present.     Pulmonic Valve  The pulmonic valve is not well visualized. There is trace pulmonic valvular  regurgitation.     Vessels  The aorta root is normal. IVC diameter <2.1 cm collapsing >50% with sniff  suggests a normal RA pressure of 3 mmHg.     Pericardium  There is no pericardial effusion.     Rhythm  Sinus rhythm was noted.  ______________________________________________________________________________  MMode/2D Measurements & Calculations     IVSd: 0.86 cm  LVIDd: 4.4 cm  LVIDs: 2.7 cm  LVPWd: 1.0 cm  FS: 37.5 %  LV mass(C)d: 134.5 grams  LV mass(C)dI: 62.7 grams/m2  Ao root diam: 3.0 cm  asc Aorta Diam: 2.9 cm  LVOT diam: 1.9 cm  LVOT area: 2.8 cm2  Ao root diam index Ht(cm/m): 1.9  Ao root diam index BSA (cm/m2): 1.4  Asc Ao diam index BSA (cm/m2): 1.4  Asc Ao diam index Ht(cm/m): 1.8  EF Biplane:  69.3 %  LA Volume (BP): 35.7 ml     LA Volume Index (BP): 16.7 ml/m2  LA Volume Indexed (AL/bp): 17.5 ml/m2  RWT: 0.47  TAPSE: 2.9 cm     Doppler Measurements & Calculations  MV E max esdras: 106.0 cm/sec  MV A max esdras: 69.7 cm/sec  MV E/A: 1.5  MV dec slope: 633.0 cm/sec2  MV dec time: 0.17 sec  Ao V2 max: 145.0 cm/sec  Ao max P.0 mmHg  Ao V2 mean: 102.0 cm/sec  Ao mean P.0 mmHg  Ao V2 VTI: 30.2 cm  CLAUDIA(I,D): 1.8 cm2  CLAUDIA(V,D): 1.9 cm2  LV V1 max PG: 3.7 mmHg  LV V1 max: 96.6 cm/sec  LV V1 VTI: 19.7 cm  SV(LVOT): 55.9 ml  SI(LVOT): 26.0 ml/m2  PA V2 max: 140.0 cm/sec  PA max P.8 mmHg  PA acc time: 0.11 sec  TR max esdras: 244.0 cm/sec  TR max P.8 mmHg  AV Esdras Ratio (DI): 0.67  CLAUDIA Index (cm2/m2): 0.86  E/E' avg: 10.8  Lateral E/e': 8.6  Medial E/e': 13.0  RV S Esdras: 12.6 cm/sec     ______________________________________________________________________________  Report approved by: Bonnie Omalley 10/18/2024 02:05 PM             Discharge Medications   Current Discharge Medication List        CONTINUE these medications which have CHANGED    Details   promethazine (PHENERGAN) 12.5 MG tablet Take 1 tablet (12.5 mg) by mouth every 6 hours as needed for nausea.    Associated Diagnoses: Nausea           CONTINUE these medications which have NOT CHANGED    Details   acetaminophen (TYLENOL) 325 MG tablet Take 650 mg by mouth every 6 hours as needed for mild pain      albuterol (PROAIR HFA/PROVENTIL HFA/VENTOLIN HFA) 108 (90 Base) MCG/ACT inhaler Inhale 1 puff into the lungs every 6 hours as needed for shortness of breath.      ARIPiprazole lauroxil ER (ARISTADA) 882 MG/3.2ML intra-muscular Inject 882 mg into the muscle every 28 days On the  of each month      benztropine (COGENTIN) 1 MG tablet Take 1 mg by mouth 2 times daily.      cetirizine (ZYRTEC) 10 MG tablet Take 1 tablet by mouth daily.      cloNIDine (CATAPRES) 0.1 MG tablet Take 1 tablet by mouth daily      clotrimazole (LOTRIMIN) 1 % external  cream Apply topically 2 times daily.      dicyclomine (BENTYL) 20 MG tablet Take 20 mg by mouth 2 times daily.      divalproex sodium extended-release (DEPAKOTE ER) 250 MG 24 hr tablet Take 250 mg by mouth 2 times daily.      docusate sodium (COLACE) 50 MG capsule Take 100 mg by mouth 2 times daily      famotidine (PEPCID) 20 MG tablet Take 20 mg by mouth at bedtime.      FLUoxetine (PROZAC) 40 MG capsule Take 80 mg by mouth daily      hydrOXYzine (ATARAX) 50 MG tablet Take 50 mg by mouth 2 times daily as needed for anxiety      lactase (LACTAID) 3000 UNIT tablet Take 1 tablet (3,000 Units) by mouth 3 times daily as needed for indigestion  Qty: 30 tablet, Refills: 1      levothyroxine (SYNTHROID/LEVOTHROID) 25 MCG tablet Take 1 tablet (25 mcg) by mouth daily for 30 days  Qty: 30 tablet, Refills: 0      multivitamin w/minerals (MULTI-VITAMIN) tablet Take 1 tablet by mouth daily.      OLANZapine zydis (ZYPREXA) 5 MG ODT Take 1 tablet (5 mg) by mouth At Bedtime  Qty: 30 tablet, Refills: 0      ondansetron (ZOFRAN) 8 MG tablet Take 8 mg by mouth every 8 hours as needed for nausea.      pantoprazole (PROTONIX) 40 MG EC tablet Take 40 mg by mouth daily      senna-docusate (SENOKOT-S/PERICOLACE) 8.6-50 MG tablet Take 1 tablet by mouth 2 times daily as needed.      sodium chloride 0.65 % nasal spray Spray 1 spray in nostril daily as needed.      traZODone (DESYREL) 100 MG tablet Take 100 mg by mouth at bedtime.      Vitamin D, Cholecalciferol, 25 MCG (1000 UT) TABS Take 1,000 Units by mouth daily       polyethylene glycol (MIRALAX) 17 GM/Dose powder Take 1 Capful by mouth daily as needed for constipation.           Allergies   Allergies   Allergen Reactions    Penicillins Rash and Unknown

## 2024-10-18 NOTE — PLAN OF CARE
"Goal Outcome Evaluation: pt discharge orders placed  Problem: Adult Inpatient Plan of Care  Goal: Plan of Care Review  Description: The Plan of Care Review/Shift note should be completed every shift.  The Outcome Evaluation is a brief statement about your assessment that the patient is improving, declining, or no change.  This information will be displayed automatically on your shift  note.  10/18/2024 1609 by Morgan Garcia RN  Outcome: Adequate for Care Transition  10/18/2024 0531 by Morgan Garcia RN  Outcome: Progressing  Flowsheets (Taken 10/18/2024 0531)  Outcome Evaluation: no seizure like activity observed this shift  Plan of Care Reviewed With: patient  Overall Patient Progress: improving  Goal: Patient-Specific Goal (Individualized)  Description: You can add care plan individualizations to a care plan. Examples of Individualization might be:  \"Parent requests to be called daily at 9am for status\", \"I have a hard time hearing out of my right ear\", or \"Do not touch me to wake me up as it startles  me\".  Outcome: Adequate for Care Transition  Goal: Absence of Hospital-Acquired Illness or Injury  10/18/2024 1609 by Morgan Garcia RN  Outcome: Adequate for Care Transition  10/18/2024 0531 by Morgan Garcia RN  Outcome: Progressing  Intervention: Prevent Skin Injury  Recent Flowsheet Documentation  Taken 10/18/2024 0316 by Morgan Garcia RN  Body Position: position changed independently  Intervention: Prevent Infection  Recent Flowsheet Documentation  Taken 10/18/2024 0316 by Morgan Garcia RN  Infection Prevention:   hand hygiene promoted   personal protective equipment utilized   single patient room provided  Goal: Optimal Comfort and Wellbeing  10/18/2024 1609 by Morgan Garcia RN  Outcome: Adequate for Care Transition  10/18/2024 0531 by Morgan Garcia RN  Outcome: Progressing  Goal: Readiness for Transition of Care  Outcome: Adequate for Care Transition     Problem: " Suicide Risk  Goal: Absence of Self-Harm  Outcome: Adequate for Care Transition         Plan of Care Reviewed With: patient    Overall Patient Progress: improvingOverall Patient Progress: improving    Outcome Evaluation: no seizure like activity observed this shift

## 2024-10-18 NOTE — CONSULTS
"[facility]  Neurology Consultation    Patient Name:  Sadaf Ross  MRN:  [id number]    :  2000  Date of Service:  2024  Primary care provider:  [facility], [facility]      Neurology consultation service was asked to see Sadaf Ross by Dr. Campos to evaluate for abnormal movements, possible seizure.    Chief Complaint: Abnormal movements    History of Present Illness:   Sadaf Ross is a 24 year old female with history of intellectual disability, depression, bipolar, ADHD, chronic abdominal pain who presented to the emergency department 10/17 after an episode of loss of consciousness.  She was reported to have had a syncopal event and then 3 episodes of vomiting.  It appears she had a subsequent event where she was unresponsive for 2 minutes and then again for 5 minutes.  The longest event was also associated with jerking movements.  Blood pressure was reported to be 154/74, with normal respiratory rate.  She describes to me that she is able to remember 2 of these events happening yesterday, they were preceded by feeling nauseous, diaphoretic, and then she has been episode where she is not able to recall.  She did not hear anyone talking with her during the time of impaired consciousness.  She regained her baseline level of function and ability to communicate within minutes of the events.  She denies any tongue biting, head injury, or any other injury that occurred during the episode of impaired consciousness.    I have reviewed her chart including no prior outpatient neurology notes, and an EEG performed 2020 which was normal.  The indications for the EEG were 2 episodes of impaired consciousness and tremulousness.  I do see a nursing note from 2024 describing a severe degree of depression describing that she would \"harm herself,\"  and her method of harming herself would be that she would bite herself.    I interviewed and examined her in " "person, her mother and stepfather were therefore the initial part of my evaluation.  She has had 2-3 of these events that she describes, and her family members have not witnessed these.  None of the family members had recollection of her prior normal EEG, and no other episodes that suggest seizure activity.    After her family left she was sad, very tearful, she described feeling alone.  She says that she is able to see her family only once a week, and is typically tearful after they leave.  She expressed to me that she would not mind if she , would not want to be resuscitated, and said that she might overdose on pills.  She denies any recent suicide attempts, and she denies to me that she used any excessive pills before presenting to the hospital.      ROS  A comprehensive ROS was performed and pertinent findings were included in HPI.     PMH  Past Medical History:   Diagnosis Date    ADHD (attention deficit hyperactivity disorder)     Bipolar 1 disorder (H)     Borderline personality disorder (H)     Depressive disorder     Intellectual disability     Obesity     Syncope      Past Surgical History:   Procedure Laterality Date    APPENDECTOMY         Medications   I have personally reviewed the patient's medication list.     Allergies  I have personally reviewed the patient's allergy list.       Physical Examination   Vitals: /55 (BP Location: Left arm)   Pulse 88   Temp 98.3  F (36.8  C) (Oral)   Resp 16   Ht 1.6 m (5' 3\")   Wt 115.7 kg (255 lb)   LMP  (LMP Unknown)   SpO2 97%   BMI 45.17 kg/m    General: Lying in bed, NAD  Head: NC/AT  Eyes: no icterus, op pink and moist  Cardiac: RRR. Extremities warm, no edema.   Respiratory: non-labored on RA  GI: S/NT/ND  Skin: No rash or lesion on exposed skin  Psych: Tearful, she describes feeling sad, hopeless, and that she would not mind if he , would not want to be resuscitated.  Neuro:  Mental status: Awake, alert, attentive, oriented to self, " time, place, and circumstance. Language is fluent and coherent.  Cranial nerves: VFF, PERRL, conjugate gaze, EOMI, facial sensation intact, face symmetric, shoulder shrug strong, tongue/uvula midline, no dysarthria.   Motor: Normal bulk and tone. No abnormal movements. 5/5 strength bilaterally in deltoids, biceps, triceps, hand , hip flexors, hip extensors, knee flexion, knee extension, plantarflexion, dorsiflexion.   Reflexes: Normo-reflexic and symmetric biceps, brachioradialis, patellae, and achilles. Negative Dunbar, no clonus, toes down-going.  Sensory: Intact to light touch in proximal and distal aspects of all 4 extremities   Coordination: FNF without ataxia or dysmetria. Rapid alternating movements intact.   Gait: Normal width, stride length, turn, and arm swing.     Investigations   I have personally reviewed pertinent labs, tests, and radiological imaging. Discussion of notable findings is included under Impression.     I have reviewed labs including normal sodium, potassium, creatinine, ALT, AST which are all normal.  White count normal, hemoglobin 11.5, normal platelet, urinalysis normal, respiratory viral panel is negative    I have reviewed head CT which is unremarkable    Was patient transferred from outside hospital?   No    Impression  #Bipolar disorder  #Anxiety disorder  #Borderline personality disorder  #Syncopal events versus nonepileptic spells    Ms. Ross is a 24-year-old woman with a significant psychiatric history presents after a couple of episodes of impaired consciousness.  These were preceded by a prodrome that is consistent with presyncope, and she did not have any postictal state.  The events themselves sound most assistant with either syncope, convulsive syncope, versus psychogenic nonepileptic spells.  She had a normal EEG in 2020 which was prompted by a similar incident, and her EEG performed this morning was also normal, though since no events were captured this cannot  definitively determine the etiology of the spells.    As described above, she was tearful and expressed suicidal thoughts during my interview so I have made the recommendations as outlined below.    No further inpatient workup is indicated from a neurologic perspective.    Recommendations  -EEG completed, this is normal  - I discussed patient's suicidal thoughts with the primary team, and they have since contacted psychiatry  - I also recommended a one-to-one for patient's safety and this is currently in place-this can be discontinued at the discretion of psychiatry or the internal medicine team  - Orthostatic vitals  - No current indication to start seizure medication  - Inpatient neurology will sign off at this time, please call if any new questions arise    Thank you for involving Neurology in the care of Sadaf Ross.  Please do not hesitate to call with questions.     Angel Flores MD    Billed as a level 4 consultation due to patient presenting with an acute on chronic illness posing a severe threat to bodily function.  I have reviewed notes from the primary team, nursing notes.  And I have reviewed labs above including ALT, AST, BMP, CBC.

## 2024-10-18 NOTE — PROGRESS NOTES
Patient discharged to home at 1630 via Private Car.  Accompanied by sister and staff.  Discharge instructions were reviewed with  NA , opportunity offered to ask questions.    Prescriptions e-prescribed to pharmacy.  Access discontinued: Yes  Care plan and education discontinued: Yes  Home meds retrieved from pharmacy: No  Belongings were sent home with patient/family:  Cell phone/electronics: 1, Clothing: Shirt(s): 1, Pants: 1, Underclothes: 1, and Outerwear: jacket, and Shoes: pair .

## 2024-10-18 NOTE — H&P
[facility]    History and Physical - Hospitalist Service       Date of Admission:  10/17/2024    Assessment & Plan   Sadaf Ross is a 24 year old female with history of bipolar disorder, depression, intellectual disability, ADHD, chronic abdominal pain admitted on 10/17/2024 with syncope and concern for possible seizure.     Suspected syncope  Possible seizure  Unclear whether this is secondary to syncope from orthostatic hypotension in the setting of polypharmacy versus seizure disorder.  Head CT was negative for acute intracranial process.  EKG showed normal sinus rhythm.    Resume PTA Depakote after medication reconciliation by the pharmacist  Continue telemetry  Check orthostatic blood pressure  Follow-up cardiology consult  Follow-up neurology consult    Bipolar disorder  Anxiety disorder  Aripiprazole every 28 days  Resume PTA olanzapine 5 mg at bedtime after medication reconciliation by pharmacist  Benztropine 1 mg daily after medication reconciliation by the pharmacist  Resume PTA Depakote after medication reconciliation by the pharmacist  Resume PTA fluoxetine 40 mg daily  Resume PTA hydroxyzine after medication reconciliation by pharmacist    Hypothyroidism  Resume PTA levothyroxine 25 mcg daily after medication reconciliation by the pharmacist      Asthma  Resume PTA inhaled albuterol after medication reconciliation by the pharmacist      GERD  Pantoprazole 40 mg daily    Diet: Combination Diet Regular Diet Adult; No Caffeine Diet  DVT Prophylaxis: Pneumatic Compression Devices  Prince Catheter: Not present  Lines: None     Cardiac Monitoring: ACTIVE order. Indication: Syncope- high cardiac risk (48 hours)  Code Status: Full Code    Clinically Significant Risk Factors Present on Admission          # Hyperchloremia: Highest Cl = 108 mmol/L in last 2 days, will monitor as appropriate              # Hypertension: Home medication list includes antihypertensive(s)         # Severe  "Obesity: Estimated body mass index is 45.17 kg/m  as calculated from the following:    Height as of this encounter: 1.6 m (5' 3\").    Weight as of this encounter: 115.7 kg (255 lb).         # Financial/Environmental Concerns:           Disposition Plan     Medically Ready for Discharge: Anticipated in 2-4 Days           Amanda Barnes MD  Hospitalist Service  [facility]  Securely message with [employer] (more info)  Text page via [employer] Paging/Directory     ______________________________________________________________________    Chief Complaint   Syncope    History is obtained from the patient    History of Present Illness   Sadaf Ross is a 24 year old female with history of bipolar disorder, depression, intellectual disability, ADHD, chronic abdominal pain who presents to the ER with syncope versus seizure    She was in her usual state of health until about yesterday when she developed syncope and 3 episodes of vomiting.  She presented to the ER yesterday at which time CBC, BMP, magnesium, urinalysis, and test for respiratory viruses were unremarkable.  EKG showed sinus bradycardia.  Patient was subsequently referred to the cardiology clinic for workup for cardiogenic syncope.    During cardiology visit today, patient complained of chest pain and dizziness.  EKG showed normal sinus rhythm.  However, patient passed out for about 2 minutes and subsequently regained consciousness.  Patient passed out again for about 5 minutes and developed jerky movement.  She states she developed lightheadedness and throbbing headache before she passed out around 2:30 PM today.  She was subsequently transferred to the ER for further evaluation. She lives with her mother-in-law.    Initial blood pressure was 154/74, respiratory rate 24, pulse 69, temperature 99.4  F and oxygen saturation of 100% on room air.  Notable laboratory findings include chloride 108, otherwise unremarkable BMP, LFT, lipase and " beta-hCG.  Hemoglobin was 11.5 otherwise unremarkable CBC.  Urinalysis showed bacteriuria but negative for nitrites and leukocyte esterase.  Test for COVID-19, influenza A, influenza B and RSV were negative.  Head CT was negative for acute intracranial process.  EKG showed normal sinus rhythm.    She received acetaminophen in the ER.      Past Medical History    Past Medical History:   Diagnosis Date    ADHD (attention deficit hyperactivity disorder)     Bipolar 1 disorder (H)     Borderline personality disorder (H)     Depressive disorder     Intellectual disability     Obesity     Syncope        Past Surgical History   Past Surgical History:   Procedure Laterality Date    APPENDECTOMY         Prior to Admission Medications   Prior to Admission Medications   Prescriptions Last Dose Informant Patient Reported? Taking?   ANTIFUNGAL 2 % external powder   Yes No   Sig: Apply 2 g topically as needed.   ARIPiprazole lauroxil ER (ARISTADA) 882 MG/3.2ML intra-muscular   Yes No   Sig: Inject 882 mg into the muscle every 28 days On the 22nd of each month   FLUoxetine (PROZAC) 40 MG capsule   Yes No   Sig: Take 80 mg by mouth daily   OLANZapine zydis (ZYPREXA) 5 MG ODT   No No   Sig: Take 1 tablet (5 mg) by mouth At Bedtime   Vitamin D, Cholecalciferol, 25 MCG (1000 UT) TABS  Care Giver Yes No   Sig: Take 1,000 Units by mouth daily    acetaminophen (TYLENOL) 325 MG tablet   Yes No   Sig: Take 650 mg by mouth every 6 hours as needed for mild pain   albuterol (PROAIR HFA/PROVENTIL HFA/VENTOLIN HFA) 108 (90 Base) MCG/ACT inhaler   Yes No   Sig: Inhale 1 puff into the lungs 4 times daily.   benztropine (COGENTIN) 1 MG tablet   Yes No   Sig: Take 1 mg by mouth daily.   bisacodyl (DULCOLAX) 5 MG EC tablet   No No   Sig: Take 1 tablet (5 mg) by mouth daily as needed for constipation   cetirizine (ZYRTEC) 10 MG tablet   Yes No   Sig: Take 1 tablet by mouth daily.   cloNIDine (CATAPRES) 0.1 MG tablet   Yes No   Sig: Take 1 tablet by  mouth daily   clotrimazole (LOTRIMIN) 1 % external cream   Yes No   Sig: Apply topically 2 times daily.   cyclobenzaprine (FLEXERIL) 5 MG tablet   Yes No   Sig: Take 5 mg by mouth 2 times daily as needed for muscle spasms.   dicyclomine (BENTYL) 20 MG tablet   Yes No   Sig: Take 20 mg by mouth 2 times daily as needed (dyspepsia)   divalproex sodium extended-release (DEPAKOTE ER) 250 MG 24 hr tablet   Yes No   Sig: Take 500 mg by mouth daily   docusate sodium (COLACE) 50 MG capsule   Yes No   Sig: Take 100 mg by mouth 2 times daily   doxylamine (UNISOM) 25 MG TABS tablet   No No   Sig: Take 1 tablet (25 mg) by mouth at bedtime   famotidine (PEPCID) 20 MG tablet   Yes No   Sig: Take 20 mg by mouth daily   fluconazole (DIFLUCAN) 150 MG tablet   Yes No   Sig: Take 1 tablet by mouth 2 times daily.   fluticasone (FLONASE) 50 MCG/ACT nasal spray   Yes No   Sig: Spray 2 sprays in nostril daily.   guaiFENesin (MUCINEX) 600 MG 12 hr tablet   Yes No   Sig: Take 1,200 mg by mouth as needed.   hydrOXYzine (ATARAX) 50 MG tablet  Care Giver Yes No   Sig: Take 50 mg by mouth 2 times daily as needed for anxiety   lactase (LACTAID) 3000 UNIT tablet   No No   Sig: Take 1 tablet (3,000 Units) by mouth 3 times daily as needed for indigestion   levothyroxine (SYNTHROID/LEVOTHROID) 25 MCG tablet   No No   Sig: Take 1 tablet (25 mcg) by mouth daily for 30 days   loperamide (IMODIUM A-D) 2 MG tablet   No No   Sig: Take 2 tablets (4 mg) in the morning.  Take 1 tablet (2 mg) with every loose stool.  Do not exceed 8 tablets in a day.   medroxyPROGESTERone (DEPO-PROVERA) 150 MG/ML syringe   Yes No   Sig: Inject 150 mg into the muscle every 3 months.   multivitamin w/minerals (MULTI-VITAMIN) tablet   Yes No   Sig: Take 1 tablet by mouth daily.   omeprazole (PRILOSEC) 40 MG DR capsule   Yes No   Sig: Take 40 mg by mouth daily.   ondansetron (ZOFRAN ODT) 4 MG ODT tab   No No   Sig: Take 1 tablet (4 mg) by mouth every 6 hours as needed for nausea.    ondansetron (ZOFRAN) 4 MG tablet   No No   Sig: Take 1 tablet (4 mg) by mouth every 8 hours as needed   pantoprazole (PROTONIX) 40 MG EC tablet   Yes No   Sig: Take 40 mg by mouth daily   phenazopyridine (PYRIDIUM) 100 MG tablet   No No   Sig: Take 1 tablet (100 mg) by mouth 3 times daily as needed for urinary tract discomfort   polyethylene glycol (MIRALAX) 17 GM/Dose powder   Yes No   Sig: Take 1 Capful by mouth daily as needed for constipation.   promethazine (PHENERGAN) 12.5 MG tablet   Yes No   Sig: Take 12.5 mg by mouth every 4 hours   senna-docusate (SENOKOT-S/PERICOLACE) 8.6-50 MG tablet   Yes No   Sig: Take 1 tablet by mouth 2 times daily   sodium chloride 0.65 % nasal spray   Yes No   Sig: Spray 1 spray in nostril daily as needed.   traZODone (DESYREL) 50 MG tablet   No No   Sig: Take 1 tablet (50 mg) by mouth at bedtime      Facility-Administered Medications: None        Review of Systems    The 10 point Review of Systems is negative other than noted in the HPI or here.     Physical Exam   Vital Signs: Temp: 97.6  F (36.4  C) Temp src: Oral BP: 115/59 Pulse: 78   Resp: 19 SpO2: 100 % O2 Device: None (Room air)    Weight: 255 lbs 0 oz    General appearance: Young woman with intellectual disability, obese, awake, Alert, Cooperative, not in any obvious distress and appears stated age   HEENT: Normocephalic, atraumatic, conjunctiva clear without icterus and ears without discharge  Lungs: Clear to auscultation bilaterally, no wheezing, good air exchange, normal work of breathing  Cardiovascular: Regular Rate and Rythm, normal apical impulse, normal S1 and S2, no lower extremity edema bilaterally  Abdomen: Soft, non-tender and Non-distended, active bowel sounds  Skin: Skin color, texture normal and bruising or bleeding. No rashes or lesions over face, neck, arms and legs, turgor normal.  Musculoskeletal: No bony deformities or joint tenderness. Normal ROM upon flexion & extension.   Neurologic: Alert &  Oriented X 3, Facial symmetry preserved and upper & lower extremities moving well with symmetry  Psychiatric: Calm, normal eye contact and normal affect      Medical Decision Making       60 MINUTES SPENT BY ME on the date of service doing chart review, history, exam, documentation & further activities per the note.      Data     I have personally reviewed the following data over the past 24 hrs:    7.1  \   11.5 (L)   / 181     143 108 (H) 7.0 /  88   4.5 25 0.81 \     ALT: 11 AST: 10 AP: 52 TBILI: 0.2   ALB: 4.4 TOT PROTEIN: 7.0 LIPASE: 40       Imaging results reviewed over the past 24 hrs:   Recent Results (from the past 24 hour(s))   Head CT w/o contrast    Narrative    EXAM: CT HEAD W/O CONTRAST  LOCATION: [facility]  DATE: 10/17/2024    INDICATION: For seizure, possible fall  COMPARISON: None.  TECHNIQUE: Routine CT Head without IV contrast. Multiplanar reformats. Dose reduction techniques were used.    FINDINGS:  INTRACRANIAL CONTENTS: No evidence of acute intracranial hemorrhage or mass effect. Brain attenuation and morphology are normal. The ventricles and sulci are normal for age. Normal gray-white matter differentiation. The basilar cisterns are patent.    VISUALIZED ORBITS/SINUSES/MASTOIDS: The globes are unremarkable. The partially imaged paranasal sinuses, mastoid air cells and middle ear cavities are unremarkable.     BONES/SOFT TISSUES: The visualized skull base and calvarium are unremarkable.      Impression    IMPRESSION:    1.  No evidence of acute intracranial hemorrhage or mass effect.

## 2024-10-18 NOTE — ED PROVIDER NOTES
EMERGENCY DEPARTMENT ENCOUNTER      NAME: Sadaf Ross  AGE: 24 year old female  YOB: 2000  MRN: [id number]  EVALUATION DATE & TIME: 10/17/2024  7:55 PM    [facility]    ED PROVIDER: Matt Solorzano M.D.    Chief Complaint   Patient presents with    Seizures     Pt states she had a seizure earlier today, states Hx of seizures but no meds HA, N/V, pain 9/10 Headache ATT         FINAL IMPRESSION:  1. Syncope, unspecified syncope type    2. Seizure-like activity (H)        ED COURSE & MEDICAL DECISION MAKING:    Pertinent Labs & Imaging studies independently interpreted by me. (See chart for details)      ED Course as of 10/17/24 2115   Thu Oct 17, 2024   2004 I met patient and performed my initial exam   2006 Patient seen and examined, was seen earlier with syncope versus seizure and admission recommended, was discharged home.  However, when she got home her stepmother with whom she lives recommended that she come back to the hospital to be admitted as previously discussed.  Patient did vomit once on coming back to the emergency department but has no further seizure activity or syncopal episodes since leaving.  No focal neurologic findings, does complain of a little bit of a headache.  No indication for repeat lab testing as patient just had labs done a couple hours ago, head CT done at that time was negative and no new trauma to suggest need for repeat head CT.  Will plan for admission.   2015 Patient rechecked, confirmed no history of seizures   2024 Care discussed with Dr. Barnes for observation.         At the conclusion of the encounter I discussed the results of all of the tests and the disposition. The questions were answered. The patient or family acknowledged understanding and was agreeable with the care plan.     Medical Decision Making  Obtained supplemental history:Supplemental history obtained?: Documented in chart  Reviewed external records: External records reviewed?:  "Documented in chart and Inpatient Record: Patient was seen at [facility] ED on 10/17/24  Care impacted by chronic illness:Mental Health  Did you consider but not order tests?: Work up considered but not performed and documented in chart, if applicable  Did you interpret images independently?: Independent interpretation of ECG and images noted in documentation, when applicable.  Consultation discussion with other provider:Did you involve another provider (consultant, , pharmacy, etc.)?: I discussed the care with another health care provider, see documentation for details.  Admit.    MIPS: Not Applicable      MEDICATIONS GIVEN IN THE EMERGENCY:  Medications   ketorolac (TORADOL) injection 15 mg (15 mg Intravenous $Given 10/17/24 2057)   ondansetron (ZOFRAN) injection 4 mg (4 mg Intravenous $Given 10/17/24 2055)       NEW PRESCRIPTIONS STARTED AT TODAY'S ER VISIT  Current Discharge Medication List          =================================================================    HPI-limited due to patient's mental condition    Patient information was obtained from: Patient       Sadaf Ross is a 24 year old female with a pertinent history of  Mental health, bipolar affective disorder, borderline personality, intellectual disability and hyperhidrosis  who presents to this ED via ambulance for evaluation of seizure.     Chart review: Patient was seen at [facility] ED on 10/17/24 for syncopal episode, headache and possible seizure activity. Patient had labs ordered and CT scan of the head. Impression states: No evidence of acute intracranial hemorrhage or mass effect.     The patient reports after getting home from being discharged, her mom wanted her to come back in the hospital. She currently endorses some headache and states \"I vomited multiple times at home\". Patient thinks she has \"stomach or gastric problems\". No other complaints at this time.       REVIEW OF SYSTEMS   Review of Systems   All other systems " reviewed and negative    PAST MEDICAL HISTORY:  Past Medical History:   Diagnosis Date    ADHD (attention deficit hyperactivity disorder)     Bipolar 1 disorder (H)     Borderline personality disorder (H)     Depressive disorder     Intellectual disability     Obesity     Syncope        PAST SURGICAL HISTORY:  Past Surgical History:   Procedure Laterality Date    APPENDECTOMY         CURRENT MEDICATIONS:    No current facility-administered medications for this encounter.       ALLERGIES:  Allergies   Allergen Reactions    Penicillins Rash and Unknown       FAMILY HISTORY:  Family History   Problem Relation Age of Onset    Diabetes Type 1 Father     Cancer Paternal Grandfather        SOCIAL HISTORY:   Social History     Socioeconomic History    Marital status: Single   Tobacco Use    Smoking status: Former     Current packs/day: 0.25     Average packs/day: 0.3 packs/day for 5.0 years (1.3 ttl pk-yrs)     Types: Cigarettes, Vaping Device     Passive exposure: Past    Smokeless tobacco: Never   Substance and Sexual Activity    Alcohol use: No    Drug use: Never    Sexual activity: Not Currently     Partners: Female, Male     Birth control/protection: Injection     Social Determinants of Health     Financial Resource Strain: Not on File (1/20/2021)    Received from [facility]    Financial Resource Strain     Financial Resource Strain: 0   Recent Concern: Financial Resource Strain - At Risk (1/20/2021)    Received from [facility]    Financial Resource Strain     Financial Resource Strain: 2   Food Insecurity: Not on File (1/20/2021)    Received from [facility]    Food Insecurity     Food: 0   Recent Concern: Food Insecurity - At Risk (1/20/2021)    Received from [facility]    Food Insecurity     Food: 2   Transportation Needs: Not on File (1/20/2021)    Received from [facility]    Transportation Needs     Transportation: 0   Recent Concern: Transportation Needs - At Risk (1/20/2021)    Received from [facility] [place]     "Transportation Needs     Transportation: 2   Physical Activity: Not on File (2020)    Received from [facility]    Physical Activity     Physical Activity: 0   Stress: Not on File (2021)    Received from [facility]    Stress     Stress: 0   Social Connections: Not on File (2021)    Received from [facility]    Social Connections     Connectedness: 0   Interpersonal Safety: Not At Risk (2024)    Received from [facility]     Humiliation, Afraid, Rape, and Kick questionnaire     Fear of Current or Ex-Partner: No     Emotionally Abused: No     Physically Abused: No     Sexually Abused: No   Housing Stability: Not on File (2021)    Received from [facility]    Housing Stability     Housin       VITALS:  /84   Pulse 80   Temp 99  F (37.2  C) (Temporal)   Ht 1.6 m (5' 3\")   Wt 115.7 kg (255 lb)   LMP  (LMP Unknown)   SpO2 96%   BMI 45.17 kg/m      PHYSICAL EXAM:  Physical Exam     LAB:  All pertinent labs reviewed and interpreted.       RADIOLOGY:  Reviewed all pertinent imaging. Please see official radiology report.  No orders to display       I, Diane Contreras, am serving as a scribe to document services personally performed by Dr. Solorzano based on my observation and the provider's statements to me. I, Matt Solorzano MD attest that Diane Contreras is acting in a scribe capacity, has observed my performance of the services and has documented them in accordance with my direction.    Matt Solorzano M.D.  Emergency Medicine  [facility] EMERGENCY DEPARTMENT  [facility] [facility] [id number] [phone]  Dept: [phone]       Matt Solorzano MD  10/17/24 2115    "

## 2024-10-18 NOTE — PHARMACY-ADMISSION MEDICATION HISTORY
Pharmacist Admission Medication History    Admission medication history is complete. The information provided in this note is only as accurate as the sources available at the time of the update.    Information Source(s): Patient, Clinic records, and CareEverywhere/SureScripts via in-person    Pertinent Information: Patient was able to confirm current medications and last known administration times.    Changes made to PTA medication list:  Added: None  Deleted: Bisacodyl, Cyclobenzaprine, Fluconazole, Flonase, Mucinex, loperamide, Depo-provera, omeprazole, pyridium, sodium chloride nasal spray,   Changed: None    Allergies reviewed with patient and updates made in EHR: yes    Medication History Completed By: Angel Tran [facility] 10/17/2024 10:59 PM    PTA Med List   Medication Sig Last Dose    acetaminophen (TYLENOL) 325 MG tablet Take 650 mg by mouth every 6 hours as needed for mild pain PRN    albuterol (PROAIR HFA/PROVENTIL HFA/VENTOLIN HFA) 108 (90 Base) MCG/ACT inhaler Inhale 1 puff into the lungs every 6 hours as needed for shortness of breath. PRN    ARIPiprazole lauroxil ER (ARISTADA) 882 MG/3.2ML intra-muscular Inject 882 mg into the muscle every 28 days On the 22nd of each month 9/22/2024    benztropine (COGENTIN) 1 MG tablet Take 1 mg by mouth 2 times daily. 10/17/2024 at am    cetirizine (ZYRTEC) 10 MG tablet Take 1 tablet by mouth daily. 10/17/2024 at am    cloNIDine (CATAPRES) 0.1 MG tablet Take 1 tablet by mouth daily 10/17/2024 at am    clotrimazole (LOTRIMIN) 1 % external cream Apply topically 2 times daily. 10/17/2024 at am    dicyclomine (BENTYL) 20 MG tablet Take 20 mg by mouth 2 times daily. 10/17/2024 at am    divalproex sodium extended-release (DEPAKOTE ER) 250 MG 24 hr tablet Take 250 mg by mouth 2 times daily. 10/17/2024 at am    docusate sodium (COLACE) 50 MG capsule Take 100 mg by mouth 2 times daily 10/17/2024 at am    famotidine (PEPCID) 20 MG tablet Take 20 mg by mouth at bedtime.  10/16/2024 at pm    FLUoxetine (PROZAC) 40 MG capsule Take 80 mg by mouth daily 10/17/2024 at am    hydrOXYzine (ATARAX) 50 MG tablet Take 50 mg by mouth 2 times daily as needed for anxiety PRN    lactase (LACTAID) 3000 UNIT tablet Take 1 tablet (3,000 Units) by mouth 3 times daily as needed for indigestion PRN    levothyroxine (SYNTHROID/LEVOTHROID) 25 MCG tablet Take 1 tablet (25 mcg) by mouth daily for 30 days 10/17/2024 at am    multivitamin w/minerals (MULTI-VITAMIN) tablet Take 1 tablet by mouth daily. 10/17/2024 at am    OLANZapine zydis (ZYPREXA) 5 MG ODT Take 1 tablet (5 mg) by mouth At Bedtime 10/16/2024 at pm    ondansetron (ZOFRAN) 8 MG tablet Take 8 mg by mouth every 8 hours as needed for nausea. PRN    pantoprazole (PROTONIX) 40 MG EC tablet Take 40 mg by mouth daily 10/17/2024 at am    promethazine (PHENERGAN) 12.5 MG tablet Take 12.5 mg by mouth every 4 hours 10/17/2024 at am    senna-docusate (SENOKOT-S/PERICOLACE) 8.6-50 MG tablet Take 1 tablet by mouth 2 times daily as needed. PRN    sodium chloride 0.65 % nasal spray Spray 1 spray in nostril daily as needed. PRN    traZODone (DESYREL) 100 MG tablet Take 100 mg by mouth at bedtime. 10/16/2024 at pm    Vitamin D, Cholecalciferol, 25 MCG (1000 UT) TABS Take 1,000 Units by mouth daily  10/17/2024 at am

## 2024-10-18 NOTE — ED NOTES
Pt states was discharged from here earlier today. After getting home her mom suggested she come back for admission as instructed by previous provider. No new seizure activity. Endorses nausea and states  she had emesis on arrival.

## 2024-10-18 NOTE — CONSULTS
"      Initial Psychiatric Consult   Consult date: October 18, 2024         Reason for Consult, requesting source:    Suicidal ideation with plan    Requesting source: Kelley Campos    Labs and imaging reviewed. Provider contacted with recommendations.     Telemedicine Visit: The patient was seen for a visit utilizing the [employer] system. Permission from the patient to conduct the exam by telemedicine was obtained prior to proceeding.  Sadaf was also informed that insurance will be billed for this contact.   Patient Location):  [facility]   Provider Location: [employer]/remote  As the provider I attest to compliance with applicable laws and regulations related to telemedicine          HPI:   Psychiatry seeing patient today regarding suicidal ideation with plan.       Sadaf Ross is a 24 year old female with a pertinent history of Mental health, bipolar affective disorder, borderline personality, intellectual disability and hyperhidrosis who presents to this ED via ambulance for evaluation of syncope.      Per EMS and nursing staff note, patient was seen at at [facility] today and endorses two episodes of seizure activity there. Ambulance crew describes the seizure activity as eye fluttering and mild body shaking. Patient was then transported here.      The patient reports a syncopal episode today. She reports feeling lightheaded before the syncopal episode. No fall, loss of consciousness or head injury. Patient does not remember what happened prior to the syncopal episode. She also reports new ongoing vomiting that has been occurring for two days now. Patient states \"I can't keep anything down\". She also endorses some chest pain right now. Patient has history of seizures. No new changes to medications. Denies any alcohol, smoking or drug usage. Denies any cough, cold rhinorrhea or any other symptoms. No other complaints at this time.       Today, patient reports that she does not have " "a plan to end her life, and her suicidality is chronic. She reports, \"I live with my step-mom and she helps me out.\" She shares that she has a therapy appointment on 10/21, and also sees psychiatry for medication management.           Past Psychiatric History:   Has established OP therapy and psychiatry services.         Substance Use and History:     Tobacco Use    Smoking status: Former     Current packs/day: 0.25     Average packs/day: 0.3 packs/day for 5.0 years (1.3 ttl pk-yrs)     Types: Cigarettes, Vaping Device     Passive exposure: Past    Smokeless tobacco: Never   Substance Use Topics    Alcohol use: No           Past Medical History:   PAST MEDICAL HISTORY:   Past Medical History:   Diagnosis Date    ADHD (attention deficit hyperactivity disorder)     Bipolar 1 disorder (H)     Borderline personality disorder (H)     Depressive disorder     Intellectual disability     Obesity     Syncope        PAST SURGICAL HISTORY:   Past Surgical History:   Procedure Laterality Date    APPENDECTOMY               Family History:   FAMILY HISTORY:   Family History   Problem Relation Age of Onset    Diabetes Type 1 Father     Cancer Paternal Grandfather            Social History:   Resides with her step-mother.          Physical ROS:   The 10 point Review of Systems is negative other than noted in the HPI or here.           Medications:     Current Facility-Administered Medications   Medication Dose Route Frequency Provider Last Rate Last Admin    benztropine (COGENTIN) tablet 1 mg  1 mg Oral BID Kelley Campos MD   1 mg at 10/18/24 1334    cetirizine (zyrTEC) tablet 10 mg  10 mg Oral Daily Kelley Campos MD   10 mg at 10/18/24 1333    cloNIDine (CATAPRES) tablet 0.1 mg  0.1 mg Oral Daily Kelley Campos MD   0.1 mg at 10/18/24 1333    dicyclomine (BENTYL) tablet 20 mg  20 mg Oral BID Kelley Campos MD   20 mg at 10/18/24 1334    divalproex sodium extended-release (DEPAKOTE ER) 24 hr tablet 250 mg  250 mg Oral BID Kelley Campos" MD ANGUS   250 mg at 10/18/24 1334    docusate sodium (COLACE) capsule 100 mg  100 mg Oral BID Kelley Campos MD        famotidine (PEPCID) tablet 20 mg  20 mg Oral At Bedtime Kelley Campos MD        FLUoxetine (PROzac) capsule 80 mg  80 mg Oral Daily Kelley Campos MD   80 mg at 10/18/24 1333    levothyroxine (SYNTHROID/LEVOTHROID) tablet 25 mcg  25 mcg Oral Daily Kelley Campos MD   25 mcg at 10/18/24 1333    OLANZapine zydis (zyPREXA) ODT tab 5 mg  5 mg Oral At Bedtime Kelley Campos MD        pantoprazole (PROTONIX) EC tablet 40 mg  40 mg Oral QAM AC Amanda Barnes MD   40 mg at 10/18/24 1335    sodium chloride (PF) 0.9% PF flush 3 mL  3 mL Intracatheter Q8H Amanda Barnes MD   3 mL at 10/18/24 0820    traZODone (DESYREL) tablet 100 mg  100 mg Oral At Bedtime Kelley Campos MD                  Allergies:     Allergies   Allergen Reactions    Penicillins Rash and Unknown          Labs:     Recent Results (from the past 48 hour(s))   Basic metabolic panel    Collection Time: 10/16/24  3:40 PM   Result Value Ref Range    Sodium 143 135 - 145 mmol/L    Potassium 4.5 3.4 - 5.3 mmol/L    Chloride 108 (H) 98 - 107 mmol/L    Carbon Dioxide (CO2) 23 22 - 29 mmol/L    Anion Gap 12 7 - 15 mmol/L    Urea Nitrogen 8.4 6.0 - 20.0 mg/dL    Creatinine 0.81 0.51 - 0.95 mg/dL    GFR Estimate >90 >60 mL/min/1.73m2    Calcium 9.3 8.8 - 10.4 mg/dL    Glucose 94 70 - 99 mg/dL   Magnesium    Collection Time: 10/16/24  3:40 PM   Result Value Ref Range    Magnesium 2.3 1.7 - 2.3 mg/dL   HCG qualitative Blood    Collection Time: 10/16/24  3:40 PM   Result Value Ref Range    hCG Serum Qualitative Negative Negative   CBC with platelets and differential    Collection Time: 10/16/24  3:40 PM   Result Value Ref Range    WBC Count 6.0 4.0 - 11.0 10e3/uL    RBC Count 4.41 3.80 - 5.20 10e6/uL    Hemoglobin 11.8 11.7 - 15.7 g/dL    Hematocrit 36.8 35.0 - 47.0 %    MCV 83 78 - 100 fL    MCH 26.8 26.5 - 33.0 pg    MCHC 32.1 31.5 - 36.5 g/dL     RDW 13.3 10.0 - 15.0 %    Platelet Count 188 150 - 450 10e3/uL    % Neutrophils 56 %    % Lymphocytes 35 %    % Monocytes 5 %    % Eosinophils 3 %    % Basophils 1 %    % Immature Granulocytes 0 %    NRBCs per 100 WBC 0 <1 /100    Absolute Neutrophils 3.4 1.6 - 8.3 10e3/uL    Absolute Lymphocytes 2.1 0.8 - 5.3 10e3/uL    Absolute Monocytes 0.3 0.0 - 1.3 10e3/uL    Absolute Eosinophils 0.2 0.0 - 0.7 10e3/uL    Absolute Basophils 0.0 0.0 - 0.2 10e3/uL    Absolute Immature Granulocytes 0.0 <=0.4 10e3/uL    Absolute NRBCs 0.0 10e3/uL   UA with Microscopic reflex to Culture    Collection Time: 10/16/24  3:41 PM    Specimen: Urine, Midstream   Result Value Ref Range    Color Urine Light Yellow Colorless, Straw, Light Yellow, Yellow    Appearance Urine Clear Clear    Glucose Urine Negative Negative mg/dL    Bilirubin Urine Negative Negative    Ketones Urine Negative Negative mg/dL    Specific Gravity Urine 1.022 1.001 - 1.030    Blood Urine Negative Negative    pH Urine 7.0 5.0 - 7.0    Protein Albumin Urine Negative Negative mg/dL    Urobilinogen Urine <2.0 <2.0 mg/dL    Nitrite Urine Negative Negative    Leukocyte Esterase Urine Negative Negative    Bacteria Urine Few (A) None Seen /HPF    Mucus Urine Present (A) None Seen /LPF    RBC Urine <1 <=2 /HPF    WBC Urine 1 <=5 /HPF    Squamous Epithelials Urine 1 <=1 /HPF   Symptomatic Influenza A/B, RSV, & SARS-CoV2 PCR (COVID-19) Nasopharyngeal    Collection Time: 10/16/24  3:46 PM    Specimen: Nasopharyngeal; Swab   Result Value Ref Range    Influenza A PCR Negative Negative    Influenza B PCR Negative Negative    RSV PCR Negative Negative    SARS CoV2 PCR Negative Negative   ECG 12-LEAD WITH MUSE (LHE)    Collection Time: 10/17/24  2:23 PM   Result Value Ref Range    Systolic Blood Pressure  mmHg    Diastolic Blood Pressure  mmHg    Ventricular Rate 69 BPM    Atrial Rate 69 BPM    IL Interval 172 ms    QRS Duration 94 ms     ms    QTc 424 ms    P Axis 50 degrees     R AXIS 21 degrees    T Axis 5 degrees    Interpretation ECG       Sinus rhythm  Normal ECG  When compared with ECG of 16-Oct-2024 17:26,  No significant change was found  Confirmed by ZEUS DEAN MD LOC: ([id number]) on 10/17/2024 3:57:39 PM     Basic metabolic panel    Collection Time: 10/17/24  4:19 PM   Result Value Ref Range    Sodium 143 135 - 145 mmol/L    Potassium 4.5 3.4 - 5.3 mmol/L    Chloride 108 (H) 98 - 107 mmol/L    Carbon Dioxide (CO2) 25 22 - 29 mmol/L    Anion Gap 10 7 - 15 mmol/L    Urea Nitrogen 7.0 6.0 - 20.0 mg/dL    Creatinine 0.81 0.51 - 0.95 mg/dL    GFR Estimate >90 >60 mL/min/1.73m2    Calcium 9.0 8.8 - 10.4 mg/dL    Glucose 88 70 - 99 mg/dL   CBC with platelets and differential    Collection Time: 10/17/24  4:19 PM   Result Value Ref Range    WBC Count 7.1 4.0 - 11.0 10e3/uL    RBC Count 4.22 3.80 - 5.20 10e6/uL    Hemoglobin 11.5 (L) 11.7 - 15.7 g/dL    Hematocrit 35.3 35.0 - 47.0 %    MCV 84 78 - 100 fL    MCH 27.3 26.5 - 33.0 pg    MCHC 32.6 31.5 - 36.5 g/dL    RDW 13.6 10.0 - 15.0 %    Platelet Count 181 150 - 450 10e3/uL    % Neutrophils 60 %    % Lymphocytes 31 %    % Monocytes 6 %    % Eosinophils 2 %    % Basophils 1 %    % Immature Granulocytes 0 %    NRBCs per 100 WBC 0 <1 /100    Absolute Neutrophils 4.3 1.6 - 8.3 10e3/uL    Absolute Lymphocytes 2.2 0.8 - 5.3 10e3/uL    Absolute Monocytes 0.4 0.0 - 1.3 10e3/uL    Absolute Eosinophils 0.2 0.0 - 0.7 10e3/uL    Absolute Basophils 0.0 0.0 - 0.2 10e3/uL    Absolute Immature Granulocytes 0.0 <=0.4 10e3/uL    Absolute NRBCs 0.0 10e3/uL   Hepatic function panel    Collection Time: 10/17/24  4:19 PM   Result Value Ref Range    Protein Total 7.0 6.4 - 8.3 g/dL    Albumin 4.4 3.5 - 5.2 g/dL    Bilirubin Total 0.2 <=1.2 mg/dL    Alkaline Phosphatase 52 40 - 150 U/L    AST 10 0 - 45 U/L    ALT 11 0 - 50 U/L    Bilirubin Direct <0.20 0.00 - 0.30 mg/dL   Lipase    Collection Time: 10/17/24  4:19 PM   Result Value Ref Range    Lipase 40 13 -  "60 U/L   Basic metabolic panel    Collection Time: 10/18/24  5:29 AM   Result Value Ref Range    Sodium 142 135 - 145 mmol/L    Potassium 4.1 3.4 - 5.3 mmol/L    Chloride 107 98 - 107 mmol/L    Carbon Dioxide (CO2) 23 22 - 29 mmol/L    Anion Gap 12 7 - 15 mmol/L    Urea Nitrogen 9.1 6.0 - 20.0 mg/dL    Creatinine 0.93 0.51 - 0.95 mg/dL    GFR Estimate 87 >60 mL/min/1.73m2    Calcium 8.7 (L) 8.8 - 10.4 mg/dL    Glucose 87 70 - 99 mg/dL   Extra Purple Top EDTA (LAB USE ONLY)    Collection Time: 10/18/24  5:29 AM   Result Value Ref Range    Hold Specimen JIC    Echocardiogram Complete    Collection Time: 10/18/24  1:40 PM   Result Value Ref Range    LVEF  60-65%           Physical and Psychiatric Examination:     /55 (BP Location: Left arm)   Pulse 55   Temp 98.3  F (36.8  C) (Oral)   Resp 16   Ht 1.6 m (5' 3\")   Wt 115.7 kg (255 lb)   LMP  (LMP Unknown)   SpO2 96%   BMI 45.17 kg/m    Weight is 255 lbs 0 oz  Body mass index is 45.17 kg/m .    Physical Exam:  I have reviewed the physical exam as documented by by the medical team and agree with findings and assessment and have no additional findings to add at this time.         MSE:   Somewhat withdrawn, but pleasant. Denies SI/SIB, as well as AH/VH. Insight is fair, judgement appears that it could be impulsive. Alert and oriented.          DSM-5 Diagnosis:   Bipolar I Disorder          Assessment:   Psychiatry seeing patient today regarding suicidal ideation with plan. Patient has the above historical diagnoses. Patient has OP supports through psychiatry and therapy, as well as medication management. She reports that her suicidality is chronic in nature, and she does not have an active plan for suicide. Patient would more so benefit from continued therapy, and possibly DBT, for management of the chronic nature of her suicidal thoughts, as opposed to inpatient psychiatry.     Patient appears insightful to her mental health, and plans to follow up with " "outpatient psychiatry for medication management and therapy on 10/21.               Summary of Recommendations:   1) Continue PTA medications    2) Today Sadaf does not show any symptoms of psychosis, nor suicidal or homicidal ideations. Therefore, based on all available evidence including the factors cited above, she does not appear to be at imminent risk for self-harm, does not meet criteria for a 72-hr hold, and therefore remains appropriate for ongoing outpatient level of care.     Additional steps taken to minimize risk include: Patient has established OP psychiatry and therapy services.       Resources:   Crisis Intervention: 534.681.9149 or 762-929-3840 (TTY: 824.319.3592).  Call anytime for help.  [employer] (www.[place].celine.org): 572.398.6891 or 922-018-9016.  Suicide Awareness Voices of Education (SAVE) (www.save.org): 142-394-JZUK (4957)  National Suicide Prevention Line (www.mentalhealthmn.org): 999-859-AJEH (3373)  Mental Health Consumer/Survivor Network of [place] (www.mhcsn.net): 626.186.3211 or 798-086-6617  [facility] Crisis Response 472 919-5061  Text 4 Life: txt \"LIFE\" to 29093 for immediate support and crisis intervention  Crisis text line: Text \"[place]\" to 074873. Free, confidential, 24/7.        [facility] ([employer] Mental Illness) improves the lives of children and adults with mental illnesses and their families by providing free classes on mental illnesses and support groups for adults with mental illnesses, parents and family members. For more information: Phone: 330.180.1565 Toll free: 2-904-XTPB-HELPS Website: www.[facility] [email or web address]            Page me or re-consult psychiatry as needed.       ARCADIO Westbrook, LINN  Consult/Liaison Psychiatry  [facility]   Contact information available via [facility] Paging/Directory.  If I am not available, please call [facility] intake ([phone])        "

## 2024-10-18 NOTE — CONSULTS
[facility] HEART CARE   [address], [place] [address]   [email or web address]   OFFICE: [phone]     CARDIOLOGY INPATIENT CONSULT NOTE     Impression and Plan     Assessment:  Syncope vs seizure activity: Episode one day ago during cardiology clinic visit with LOC 2 minutes and transferred to ED. Unremarkable EKG before and during event, upon arrival in ED. Ongiong Intermittent symptoms chest discomfort and dizziness x several weeks, recurrence this morning in the bathroom and nurse does not report LOC. Given recurrence, despite unremarkable EKG, plan to evaluate heart structure. NO Fhx CAD of SCD to her knowledge.  Bipolar disorder  Hypothyroidism  Obesity    Plan:  Echocardiogram to rule out structural abnormality, if unremarkable would not recommend further cardiac work up and would sign off  Continue to monitor telemetry   Neurology has been consulted    Primary Cardiologist: Recently seen by Dr. Neely    History of Present Illness      Ms. Sadaf Ross is a 24 year old female with PMHx bipolar disorder, hypothyroidism, syncopal episodes. One day ago at cardiology clinic visit with Dr. Neely. Reported chest discomfort and dizziness leading up to event. With LOC lasting ~ 2 minutes. Reported chest pain upon recovery. Recurrent episode last 5 minutes with muscle jerking. EKG prior and during event showed NSR and normal BP reported. Labs and EKGs unremarkable after transfer to ED by ambulance. Today she reports feeling the same with mild chest discomfort, feeling lightheaded. Reports having a recurrent episode walking to bathroom this morning, reported as lack of responsiveness x3 minutes after being able to walk back to room and while sitting at edge of bed. Followed by report of SOB/dizziness and shakiness of RUE x10 sec. Episodes occurring for the last few weeks she says with multiple ED visits and unremarkable work up. She can experience chest discomfort,  "lightheadedness, chills, confusion, and nausea without LOC as well.     Denies knowledge of family history of heart disease or sudden death. Reports father  due to \"brain infection.\" She denies personal recreational drug or alcohol use, only taking prescribed mediations. Other than noted above, Ms. Ross denies any lower extremity swelling,  paroxysmal nocturnal dyspnea (PND), or orthopnea.       Review of Systems:  Further review of systems is otherwise negative/noncontributory (based on review of medical record (admission H&P) and 13 point review of systems reviewed. Pertinent positives noted).    Cardiac Diagnostics     ECG: Personally reviewed and interpreted: NSR    Telemetry (personally reviewed): NSR      Medical History  Surgical History Family History Social History   Past Medical History:   Diagnosis Date    ADHD (attention deficit hyperactivity disorder)     Bipolar 1 disorder (H)     Borderline personality disorder (H)     Depressive disorder     Intellectual disability     Obesity     Syncope      Past Surgical History:   Procedure Laterality Date    APPENDECTOMY       Family History   Problem Relation Age of Onset    Diabetes Type 1 Father     Cancer Paternal Grandfather            Social History     Socioeconomic History    Marital status: Single     Spouse name: Not on file    Number of children: Not on file    Years of education: Not on file    Highest education level: Not on file   Occupational History    Not on file   Tobacco Use    Smoking status: Former     Current packs/day: 0.25     Average packs/day: 0.3 packs/day for 5.0 years (1.3 ttl pk-yrs)     Types: Cigarettes, Vaping Device     Passive exposure: Past    Smokeless tobacco: Never   Substance and Sexual Activity    Alcohol use: No    Drug use: Never    Sexual activity: Not Currently     Partners: Female, Male     Birth control/protection: Injection   Other Topics Concern    Not on file   Social History Narrative    Not on file " "    Social Determinants of Health     Financial Resource Strain: Low Risk  (10/17/2024)    Financial Resource Strain     Within the past 12 months, have you or your family members you live with been unable to get utilities (heat, electricity) when it was really needed?: No   Food Insecurity: Low Risk  (10/17/2024)    Food Insecurity     Within the past 12 months, did you worry that your food would run out before you got money to buy more?: No     Within the past 12 months, did the food you bought just not last and you didn t have money to get more?: No   Transportation Needs: Low Risk  (10/17/2024)    Transportation Needs     Within the past 12 months, has lack of transportation kept you from medical appointments, getting your medicines, non-medical meetings or appointments, work, or from getting things that you need?: No   Physical Activity: Not on File (7/17/2020)    Received from [facility]    Physical Activity     Physical Activity: 0   Stress: Not on File (1/20/2021)    Received from [facility]    Stress     Stress: 0   Social Connections: Not on File (1/20/2021)    Received from [facility]    Social Connections     Connectedness: 0   Interpersonal Safety: Not At Risk (9/4/2024)    Received from [facility]     Humiliation, Afraid, Rape, and Kick questionnaire     Fear of Current or Ex-Partner: No     Emotionally Abused: No     Physically Abused: No     Sexually Abused: No   Housing Stability: Low Risk  (10/17/2024)    Housing Stability     Do you have housing? : Yes     Are you worried about losing your housing?: No             Physical Examination   VITALS: BP (!) 154/69 (BP Location: Left arm)   Pulse 75   Temp 97.9  F (36.6  C) (Oral)   Resp 15   Ht 1.6 m (5' 3\")   Wt 115.7 kg (255 lb)   LMP  (LMP Unknown)   SpO2 100%   BMI 45.17 kg/m    BMI: Body mass index is 45.17 kg/m .  Wt Readings from Last 3 Encounters:   10/17/24 115.7 kg (255 lb)   10/17/24 115.7 kg (255 lb)   10/17/24 111.8 kg (246 lb " "6.4 oz)     No intake or output data in the 24 hours ending 10/18/24 0909    General: pleasant female. No acute distress.   HENT: external ears normal. Nares patent. Mucous membranes moist.  Eyes: perrla, extraocular muscles intact. No scleral icterus.   Lungs: clear to auscultation  COR:  Regular rate and rhythm, No murmurs, rubs, or gallops  Extrem: No edema         Non-cardiac Imaging Studies Reviewed             Lab Results Reviewed    Chemistry/lipid CBC Cardiac Enzymes/BNP/TSH/INR   Recent Labs   Lab Test 12/09/22  0926   CHOL 147   HDL 39*   LDL 68   TRIG 201*     Recent Labs   Lab Test 12/09/22  0926 02/14/22  0840 11/26/21  0705   LDL 68 73 82     Recent Labs   Lab Test 10/18/24  0529      POTASSIUM 4.1   CHLORIDE 107   CO2 23   GLC 87   BUN 9.1   CR 0.93   GFRESTIMATED 87   SAMIR 8.7*     Recent Labs   Lab Test 10/18/24  0529 10/17/24  1619 10/16/24  1540   CR 0.93 0.81 0.81     Recent Labs   Lab Test 01/14/20  0722 11/25/19  1319   A1C 4.9 5.3          Recent Labs   Lab Test 10/17/24  1619   WBC 7.1   HGB 11.5*   HCT 35.3   MCV 84        Recent Labs   Lab Test 10/17/24  1619 10/16/24  1540 07/15/24  1309   HGB 11.5* 11.8 12.7    No results for input(s): \"TROPONINI\" in the last 75025 hours.  No results for input(s): \"BNP\", \"NTBNPI\", \"NTBNP\" in the last 20009 hours.  Recent Labs   Lab Test 06/11/24  2117   TSH 5.12*     No results for input(s): \"INR\" in the last 17181 hours.        Current Inpatient Scheduled Medications   Scheduled Meds:  Current Facility-Administered Medications   Medication Dose Route Frequency Provider Last Rate Last Admin    pantoprazole (PROTONIX) EC tablet 40 mg  40 mg Oral QAM AC Amanda Barnes MD        sodium chloride (PF) 0.9% PF flush 3 mL  3 mL Intracatheter Q8H Amanda Barnes MD   3 mL at 10/18/24 0820     Continuous Infusions:  Current Facility-Administered Medications   Medication Dose Route Frequency Provider Last Rate Last Admin       No current " outpatient medications on file.          Medications Prior to Admission   Prior to Admission medications    Medication Sig Start Date End Date Taking? Authorizing Provider   acetaminophen (TYLENOL) 325 MG tablet Take 650 mg by mouth every 6 hours as needed for mild pain   Yes Unknown, Entered By History   albuterol (PROAIR HFA/PROVENTIL HFA/VENTOLIN HFA) 108 (90 Base) MCG/ACT inhaler Inhale 1 puff into the lungs every 6 hours as needed for shortness of breath.   Yes Reported, Patient   ARIPiprazole lauroxil ER (ARISTADA) 882 MG/3.2ML intra-muscular Inject 882 mg into the muscle every 28 days On the 22nd of each month   Yes Unknown, Entered By History   benztropine (COGENTIN) 1 MG tablet Take 1 mg by mouth 2 times daily.   Yes Unknown, Entered By History   cetirizine (ZYRTEC) 10 MG tablet Take 1 tablet by mouth daily. 9/27/24 10/27/24 Yes Reported, Patient   cloNIDine (CATAPRES) 0.1 MG tablet Take 1 tablet by mouth daily 4/22/24  Yes Unknown, Entered By History   clotrimazole (LOTRIMIN) 1 % external cream Apply topically 2 times daily.   Yes Reported, Patient   dicyclomine (BENTYL) 20 MG tablet Take 20 mg by mouth 2 times daily.   Yes Unknown, Entered By History   divalproex sodium extended-release (DEPAKOTE ER) 250 MG 24 hr tablet Take 250 mg by mouth 2 times daily. 5/15/24  Yes Unknown, Entered By History   docusate sodium (COLACE) 50 MG capsule Take 100 mg by mouth 2 times daily   Yes Reported, Patient   famotidine (PEPCID) 20 MG tablet Take 20 mg by mouth at bedtime.   Yes Unknown, Entered By History   FLUoxetine (PROZAC) 40 MG capsule Take 80 mg by mouth daily   Yes Unknown, Entered By History   hydrOXYzine (ATARAX) 50 MG tablet Take 50 mg by mouth 2 times daily as needed for anxiety   Yes Reported, Patient   lactase (LACTAID) 3000 UNIT tablet Take 1 tablet (3,000 Units) by mouth 3 times daily as needed for indigestion 7/20/24  Yes Robert Olea MD   levothyroxine (SYNTHROID/LEVOTHROID) 25 MCG  "tablet Take 1 tablet (25 mcg) by mouth daily for 30 days 6/11/24 10/17/24 Yes Isa Celis,    multivitamin w/minerals (MULTI-VITAMIN) tablet Take 1 tablet by mouth daily. 8/3/23  Yes Reported, Patient   OLANZapine zydis (ZYPREXA) 5 MG ODT Take 1 tablet (5 mg) by mouth At Bedtime 12/23/22  Yes Robert Olea MD   ondansetron (ZOFRAN) 8 MG tablet Take 8 mg by mouth every 8 hours as needed for nausea.   Yes Unknown, Entered By History   pantoprazole (PROTONIX) 40 MG EC tablet Take 40 mg by mouth daily   Yes Unknown, Entered By History   promethazine (PHENERGAN) 12.5 MG tablet Take 12.5 mg by mouth every 4 hours   Yes Reported, Patient   senna-docusate (SENOKOT-S/PERICOLACE) 8.6-50 MG tablet Take 1 tablet by mouth 2 times daily as needed. 4/15/24  Yes Unknown, Entered By History   sodium chloride 0.65 % nasal spray Spray 1 spray in nostril daily as needed. 4/26/23  Yes Reported, Patient   traZODone (DESYREL) 100 MG tablet Take 100 mg by mouth at bedtime.   Yes Unknown, Entered By History   Vitamin D, Cholecalciferol, 25 MCG (1000 UT) TABS Take 1,000 Units by mouth daily    Yes Reported, Patient   polyethylene glycol (MIRALAX) 17 GM/Dose powder Take 1 Capful by mouth daily as needed for constipation. 10/2/24   Reported, Patient          Precious Osuna PA-C    Clinically Significant Risk Factors Present on Admission          # Hyperchloremia: Highest Cl = 108 mmol/L in last 2 days, will monitor as appropriate              # Hypertension: Home medication list includes antihypertensive(s)         # Severe Obesity: Estimated body mass index is 45.17 kg/m  as calculated from the following:    Height as of this encounter: 1.6 m (5' 3\").    Weight as of this encounter: 115.7 kg (255 lb).       # Financial/Environmental Concerns:          Not present on admission                Not present on admission    Not present on admission        Not present on admission        Not present on admission        "

## 2024-10-18 NOTE — PROGRESS NOTES
"PRIMARY DIAGNOSIS: \"GENERIC\" NURSING  OUTPATIENT/OBSERVATION GOALS TO BE MET BEFORE DISCHARGE:  ADLs back to baseline: Yes    Activity and level of assistance: Ambulating independently.    Pain status: Improved but still requiring IV narcotics.    Return to near baseline physical activity: Yes     Discharge Planner Nurse   Safe discharge environment identified: Yes  Barriers to discharge: Yes       Entered by: Caleb Park RN 10/17/2024 10:55 PM     A & O x 4. VSS on RA. Pain addressed with scheduled medication. Tele: NSR. Orthostatic BP negative. R PIV SL. Cardiac diet. Up independently. Nursing continue to monitor.  "

## 2024-10-18 NOTE — PROGRESS NOTES
EEG-  [id number]    Pt reported previous episode to test. Pt reports previous sz episodes over the last two weeks. Pt reports episodes are normally brought on by stress or lack of sleep. Pt has hx of cardiac episodes as well. Pt showed awake to sleep, No episode observed by tech. DANA

## 2024-10-18 NOTE — PROGRESS NOTES
"[facility]    Medicine Progress Note - Hospitalist Service    Date of Admission:  10/17/2024    Assessment & Plan   24 year old female with history of bipolar disorder, depression, intellectual disability, ADHD, chronic abdominal pain admitted on 10/17/2024 with spells, concerning for syncope versus possible seizure.     Witnessed spell   Near syncope  Possible seizure  --- Note, had ED workup 10/16 for lightheadedness and vomiting.   --- ED workup negative other than mild sinus bradycardia and therefore sent to rapid access clinic  --- 10/17 while in rapid access clinic had episode described by cardiologist as \"clinically she passed out for 2 minutes.\"  Cardiologist entered room where she told cardiologist that a medical history of seizure disorder and then had some muscle jerking that lasted 5 minutes until ambulance arrived   --- Differential wide for the symptoms and includes orthostatic hypotension in the setting of polypharmacy versus seizure disorder versus pseudoseizures versus nonepileptiform spells in the setting of anxiety/mental health disorders   --- Note, became actively suicidal today after seeing her mom, she lives with her stepmom   ---Head CT was negative for acute intracranial process.   --- EKG showed normal sinus rhythm.  ---continue  PTA Depakotete  ---Continue telemetry  ---negative orthostatic blood pressure  ---echo ordered, appreciate cardiology consult  ---appreciate neurology consult    Bipolar disorder  Anxiety disorder  ---unclear but does appear that increasing anxious symptoms possibly contributing to presentation   --- On aripiprazole every 28 days  --- Continue home olanzapine 5 mg at   --- Continue home benztropine 1 mg daily --- continue home Depakote   --- On high-dose fluoxetine 80 mg daily  --- Continue home as needed   --- It states that she is on scheduled Phenergan every 4 hours.  I am holding that until I get other verification    Acute on chronic " "suicidal ideation  --- Reported by neurologist.  Patient volunteered that she was feeling actively suicidal and would go home and take all of her medications  --- Understood this is acute on chronic problem but does have plan  --- Psychiatry consult placed  --- Note, if she attempts to leave she is holdable if wants to leave AMA prior to psychiatry seeing her  --- Suicide precautions    Hypothyroidism  --- Continue home levothyroxine 25 mcg daily     Asthma  --- No exacerbation   ---resume PTA inhaled albuterol     GERD  Chronic abdominal pain  --- Continue home pantoprazole 40 mg daily  --- Continue home Pepcid  --- Continue home MiraLAX and senna which are as needed only.  --- Continue home Bentyl       Observation Goals: List all  goals to be met before discharge:, - Diagnostic tests and consults completed and resulted, - No further episodes of syncope and any new arrhythmia addressed with controlled heart rates, - Vital signs normal or at patient baseline and orthostatic vitals are normal and patient not lightheaded with standing, - Tolerating oral intake to maintain hydration, - Safe disposition plan has been identified, - Nurse to notify provider when observation goals have been met and patient is ready for discharge.  Diet: Combination Diet Regular Diet Adult; No Caffeine Diet    DVT Prophylaxis: Low Risk/Ambulatory with no VTE prophylaxis indicated  Prince Catheter: Not present  Lines: None     Cardiac Monitoring: ACTIVE order. Indication: Syncope- high cardiac risk (48 hours)  Code Status: Full Code      Clinically Significant Risk Factors Present on Admission          # Hyperchloremia: Highest Cl = 108 mmol/L in last 2 days, will monitor as appropriate              # Hypertension: Home medication list includes antihypertensive(s)         # Severe Obesity: Estimated body mass index is 45.17 kg/m  as calculated from the following:    Height as of this encounter: 1.6 m (5' 3\").    Weight as of this encounter: " 115.7 kg (255 lb).       # Financial/Environmental Concerns: none         Disposition Plan     Medically Ready for Discharge: Anticipated Today             Kelley Campos MD  Hospitalist Service  [facility]  Securely message with [employer] (more info)  Text page via [employer] Paging/Directory   ______________________________________________________________________    Interval History   ---Patient seen twice by me.  ---resting during EEG  ---when I saw her later in the morning she was pleasant and cooperative.  She was visiting with her mom and stepdad.  Denied any further episodes.  We discussed normal telemetry, echo, and getting neurology workup.  She understood that if workup was negative she would likely discharge later in the day.    Physical Exam   Vital Signs: Temp: 98.3  F (36.8  C) Temp src: Oral BP: 119/55 Pulse: 65   Resp: 16 SpO2: 95 % O2 Device: None (Room air)    Weight: 255 lbs 0 oz    General Appearance: Pleasant, sitting up, no apparent distress  Respiratory: Clear to auscultation bilaterally  Cardiovascular: Regular rate and rhythm without murmurs rubs or gallops  GI: Obese soft and nontender  Skin: No significant lower extremity edema  Other: Neuro grossly intact.  Answer cartoons appropriately.  No focal deficits appreciated.  No tremoring or abnormal movements noted    Medical Decision Making             Data     I have personally reviewed the following data over the past 24 hrs:    7.1  \   11.5 (L)   / 181     142 107 9.1 /  87   4.1 23 0.93 \     ALT: 11 AST: 10 AP: 52 TBILI: 0.2   ALB: 4.4 TOT PROTEIN: 7.0 LIPASE: 40       Imaging results reviewed over the past 24 hrs:   Recent Results (from the past 24 hour(s))   Head CT w/o contrast    Narrative    EXAM: CT HEAD W/O CONTRAST  LOCATION: [facility]  DATE: 10/17/2024    INDICATION: For seizure, possible fall  COMPARISON: None.  TECHNIQUE: Routine CT Head without IV contrast. Multiplanar reformats. Dose  reduction techniques were used.    FINDINGS:  INTRACRANIAL CONTENTS: No evidence of acute intracranial hemorrhage or mass effect. Brain attenuation and morphology are normal. The ventricles and sulci are normal for age. Normal gray-white matter differentiation. The basilar cisterns are patent.    VISUALIZED ORBITS/SINUSES/MASTOIDS: The globes are unremarkable. The partially imaged paranasal sinuses, mastoid air cells and middle ear cavities are unremarkable.     BONES/SOFT TISSUES: The visualized skull base and calvarium are unremarkable.      Impression    IMPRESSION:    1.  No evidence of acute intracranial hemorrhage or mass effect.

## 2024-10-18 NOTE — ED TRIAGE NOTES
Pt states she had a seizure earlier today, states Hx of seizures but no meds HA, N/V, pain 9/10 Headache ATT     Triage Assessment (Adult)       Row Name 10/17/24 1953          Triage Assessment    Airway WDL WDL        Respiratory WDL    Respiratory WDL WDL        Skin Circulation/Temperature WDL    Skin Circulation/Temperature WDL WDL        Cardiac WDL    Cardiac WDL X;rhythm     Pulse Rate & Regularity tachycardic        Peripheral/Neurovascular WDL    Peripheral Neurovascular WDL WDL        Cognitive/Neuro/Behavioral WDL    Cognitive/Neuro/Behavioral WDL WDL

## 2024-10-18 NOTE — PLAN OF CARE
"  Problem: Adult Inpatient Plan of Care  Goal: Plan of Care Review  Description: The Plan of Care Review/Shift note should be completed every shift.  The Outcome Evaluation is a brief statement about your assessment that the patient is improving, declining, or no change.  This information will be displayed automatically on your shift  note.  Outcome: Progressing  Flowsheets (Taken 10/18/2024 0531)  Outcome Evaluation: no seizure like activity observed this shift  Plan of Care Reviewed With: patient  Overall Patient Progress: improving  Goal: Absence of Hospital-Acquired Illness or Injury  Outcome: Progressing  Intervention: Prevent Skin Injury  Recent Flowsheet Documentation  Taken 10/18/2024 0316 by Morgan Garcia, RN  Body Position: position changed independently  Taken 10/18/2024 0022 by Morgan Garcia RN  Body Position: position changed independently  Intervention: Prevent Infection  Recent Flowsheet Documentation  Taken 10/18/2024 0316 by Morgan Garcia, RN  Infection Prevention:   hand hygiene promoted   personal protective equipment utilized   single patient room provided  Taken 10/18/2024 0022 by Morgan Garcia RN  Infection Prevention:   hand hygiene promoted   personal protective equipment utilized   single patient room provided  Goal: Optimal Comfort and Wellbeing  Outcome: Progressing  Intervention: Monitor Pain and Promote Comfort  Recent Flowsheet Documentation  Taken 10/18/2024 0022 by Morgan Garcia, RN  Pain Management Interventions:   repositioned   rest   relaxation techniques promoted   PRIMARY DIAGNOSIS: \"GENERIC\" NURSING  OUTPATIENT/OBSERVATION GOALS TO BE MET BEFORE DISCHARGE:  ADLs back to baseline: Yes    Activity and level of assistance: Ambulating independently.    Pain status: Pain free.    Return to near baseline physical activity: Yes     Discharge Planner Nurse   Safe discharge environment identified: Yes  Barriers to discharge: Yes       Entered by: Morgan GRECO" EZEQUIEL Garcia 10/18/2024 5:32 AM     Please review provider order for any additional goals.   Nurse to notify provider when observation goals have been met and patient is ready for discharge.Goal Outcome Evaluation: pt is alert and oriented. Denies pain or discomfort. No seizure like activity observed this shift. Up independently in room. Telemetry reading NSR.       Plan of Care Reviewed With: patient    Overall Patient Progress: improvingOverall Patient Progress: improving    Outcome Evaluation: no seizure like activity observed this shift

## 2024-10-18 NOTE — PLAN OF CARE
"PRIMARY DIAGNOSIS: \"GENERIC\" NURSING  OUTPATIENT/OBSERVATION GOALS TO BE MET BEFORE DISCHARGE:  ADLs back to baseline: Yes    Activity and level of assistance: Up with standby assistance.    Pain status: Pain free.    Return to near baseline physical activity: Yes     Discharge Planner Nurse   Safe discharge environment identified: No  Barriers to discharge: Yes       Entered by: Sampson Olmedo RN 10/18/2024 2:53 PM     Please review provider order for any additional goals.   Nurse to notify provider when observation goals have been met and patient is ready for discharge.       Pt placed on 1:1 for suicidal ideation. Denies active plan. Psych consulted. Tele-SB/SR. HR dropped to 40's briefly, cardiology team  KHANH Alvarez notified.                "

## 2024-10-18 NOTE — PROGRESS NOTES
Patient admitted to room 16 at approximately 2115 via cart from emergency room.  Reason for Admission: Syncope, seizure-like activity  Report received from: EZEQUIEL Jim  Patient was accompanied by Self.  Discharge transportation provided by:  Patient ambulated/transferred:  independently. self.  Patient is alert and orientated x 3.  Outpatient Observation education provided to: (patient, family, friend)  MDRO Education done if applicable (MRSA, VRE, etc)  Safety risks were identified during admission:  none.   Yellow risk/fall band applied:  No  Home meds sent home: No  Home meds sent to pharmacy:No IF YES add 1/2 sheet laminated page reminder to chart/clipboard   Detailed Belongings: Cell phone, clothing, ring, backpack

## 2024-10-18 NOTE — PROGRESS NOTES
Care Management Discharge Note    Discharge Date: 10/18/2024       Discharge Disposition: Home (continue with currernt services)    Discharge Services: Other (see comment) (contiune with currernt services)    Discharge DME: None    Discharge Transportation: health plan transportation    Private pay costs discussed: Not applicable    Does the patient's insurance plan have a 3 day qualifying hospital stay waiver?  No    PAS Confirmation Code: NA  Patient/family educated on Medicare website which has current facility and service quality ratings: no    Education Provided on the Discharge Plan: Yes  Persons Notified of Discharge Plans: patient  Patient/Family in Agreement with the Plan: yes    Handoff Referral Completed: No, handoff not indicated or clinically appropriate    Additional Information:  Patient discharging back home.  Psych cleared patient.    No CM needs identified.     Shireen Rangel RN

## 2024-10-18 NOTE — PLAN OF CARE
"PRIMARY DIAGNOSIS: \"GENERIC\" NURSING  OUTPATIENT/OBSERVATION GOALS TO BE MET BEFORE DISCHARGE:  ADLs back to baseline: Yes    Activity and level of assistance: Up with standby assistance.    Pain status: Pain free.    Return to near baseline physical activity: Yes     Discharge Planner Nurse   Safe discharge environment identified: Yes  Barriers to discharge: Yes       Entered by: Sampson Olmedo RN 10/18/2024 10:03 AM     Please review provider order for any additional goals.   Nurse to notify provider when observation goals have been met and patient is ready for discharge.       Pt called RN while using the BR states she is having seizure. Pt helped walk to room and sat at edge of bed quietly with eyes shut and not responding to staff for about 3 minutes. Pt instructed to lay down but would not respond and when she finally laid down started to say she was feeling dizzy and having SOB. BP and oxygen WNL. 2L oxygen applied for comfort. Pt then started to have slight shakiness on the right upper extremity that lasted for approximately 10 seconds. PRN Zofran given for nausea. Pt getting EEG done. Waiting on cardiology and neurology consult. Bed alarm placed and instructed pt to call before getting out bed.                    "

## 2024-10-18 NOTE — CONSULTS
Care Management Initial Consult    General Information  Assessment completed with: Sadaf Ramey  Type of CM/SW Visit: Initial Assessment    Primary Care Provider verified and updated as needed: Yes   Readmission within the last 30 days: no previous admission in last 30 days (many ED visits, no admissions)      Reason for Consult: discharge planning  Advance Care Planning: Advance Care Planning Reviewed: other (see comments) (has POLST.  Patient is her own legal guardian)          Communication Assessment  Patient's communication style: spoken language (English or Bilingual)    Hearing Difficulty or Deaf: no        Cognitive  Cognitive/Neuro/Behavioral: WDL                      Living Environment:   People in home: parent(s) (step mom and step mom's roommate)     Current living Arrangements: apartment      Able to return to prior arrangements: yes       Family/Social Support:  Care provided by: self, parent(s)  Provides care for: no one  Marital Status: Single  Support system: Parent(s), Other (specify) (community services)          Description of Support System: Supportive, Involved    Support Assessment: Adequate family and caregiver support, Adequate social supports    Current Resources:   Patient receiving home care services: No        Community Resources: County Programs, County Worker, Transportation Services,  Mental Health  Equipment currently used at home: none  Supplies currently used at home: None    Employment/Financial:  Employment Status: disabled     Employment/ Comments: none  Financial Concerns: none   Referral to Financial Worker: No       Does the patient's insurance plan have a 3 day qualifying hospital stay waiver?  No    Lifestyle & Psychosocial Needs:  Social Determinants of Health     Food Insecurity: Low Risk  (10/17/2024)    Food Insecurity     Within the past 12 months, did you worry that your food would run out before you got money to buy more?: No     Within the past 12  months, did the food you bought just not last and you didn t have money to get more?: No   Depression: Not at risk (8/29/2024)    Received from [facility]     PHQ-2     PHQ2 Total: 2   Housing Stability: Low Risk  (10/17/2024)    Housing Stability     Do you have housing? : Yes     Are you worried about losing your housing?: No   Tobacco Use: Medium Risk (10/17/2024)    Patient History     Smoking Tobacco Use: Former     Smokeless Tobacco Use: Never     Passive Exposure: Past   Financial Resource Strain: Low Risk  (10/17/2024)    Financial Resource Strain     Within the past 12 months, have you or your family members you live with been unable to get utilities (heat, electricity) when it was really needed?: No   Alcohol Use: Not on file   Transportation Needs: Low Risk  (10/17/2024)    Transportation Needs     Within the past 12 months, has lack of transportation kept you from medical appointments, getting your medicines, non-medical meetings or appointments, work, or from getting things that you need?: No   Physical Activity: Not on File (7/17/2020)    [facility] from [facility]    Physical Activity     Physical Activity: 0   Interpersonal Safety: Not At Risk (9/4/2024)    Received from [facility]     Humiliation, Afraid, Rape, and Kick questionnaire     Fear of Current or Ex-Partner: No     Emotionally Abused: No     Physically Abused: No     Sexually Abused: No   Stress: Not on File (1/20/2021)    Received from [facility]    Stress     Stress: 0   Social Connections: Not on File (1/20/2021)    Received from [facility]    Social Connections     Connectedness: 0   Health Literacy: Not on file       Functional Status:  Prior to admission patient needed assistance:   Dependent ADLs:: Independent  Dependent IADLs:: Medication Management, Money Management, Transportation       Mental Health Status:  Mental Health Status: Current Concern  Mental Health Management: Medication, Individual Therapy,  Psychiatrist    Chemical Dependency Status:  Chemical Dependency Status: No Current Concerns             Values/Beliefs:  Spiritual, Cultural Beliefs, Gnosticism Practices, Values that affect care:                 Discussed  Partnership in Safe Discharge Planning  document with patient/family: No    Additional Information:  CM reviewed chart, discussed with care team.  CM met with patient in patient's room.  Patient lives in an apartment with her step-mom and step-mom's roommate.  Patient previously resided at a group home and removed herself from the home.  Patient has intellectual disability and multiple mental health diagnosis.  Patient is her own legal guardian, although it sounds like attempts have been made in the community to get her a legal guardian, though patient is resistant to this.  Patient has supports in the community, [facility] CM, community support agency, and mental health providers.  CM called and left voicemail for [facility] CM to obtain more information about guardianship attempts.  Patient does not use any assitive devices.  She gets assistance with transportation, money management, and medication management.     Anticipate discharge to home with step-mom today if medically cleared.     Community Resources:   [facility] : Lesley Perez with [facility] at [phone]  [facility] (contracted with [facility]). Per chart review, they have been advocating for her to have a guardian.   [facility]: Cathie Lucas, [phone]  Psychiatrist: Kristina Tilley, [phone]  Therapist: THEE Cruz  Medical Transportation: [facility]        Next Steps: monitor medical progression.    Shireen Rangel RN

## 2024-10-19 ENCOUNTER — HOSPITAL ENCOUNTER (EMERGENCY)
Facility: HOSPITAL | Age: 24
Discharge: HOME | End: 2024-10-19
Attending: EMERGENCY MEDICINE | Admitting: EMERGENCY MEDICINE
Payer: MEDICARE

## 2024-10-19 VITALS
HEART RATE: 51 BPM | SYSTOLIC BLOOD PRESSURE: 121 MMHG | RESPIRATION RATE: 18 BRPM | DIASTOLIC BLOOD PRESSURE: 59 MMHG | OXYGEN SATURATION: 99 % | BODY MASS INDEX: 45.17 KG/M2 | TEMPERATURE: 98.8 F | WEIGHT: 255 LBS

## 2024-10-19 DIAGNOSIS — R45.89 EMOTIONAL DISTRESS: ICD-10-CM

## 2024-10-19 DIAGNOSIS — R07.9 CHEST PAIN, UNSPECIFIED TYPE: ICD-10-CM

## 2024-10-19 LAB
ATRIAL RATE - MUSE: 57 BPM
DIASTOLIC BLOOD PRESSURE - MUSE: NORMAL MMHG
INTERPRETATION ECG - MUSE: NORMAL
P AXIS - MUSE: 38 DEGREES
PR INTERVAL - MUSE: 176 MS
QRS DURATION - MUSE: 86 MS
QT - MUSE: 420 MS
QTC - MUSE: 408 MS
R AXIS - MUSE: 59 DEGREES
SYSTOLIC BLOOD PRESSURE - MUSE: NORMAL MMHG
T AXIS - MUSE: 8 DEGREES
VENTRICULAR RATE- MUSE: 57 BPM

## 2024-10-19 PROCEDURE — 93005 ELECTROCARDIOGRAM TRACING: CPT | Performed by: EMERGENCY MEDICINE

## 2024-10-19 PROCEDURE — 99283 EMERGENCY DEPT VISIT LOW MDM: CPT

## 2024-10-19 ASSESSMENT — COLUMBIA-SUICIDE SEVERITY RATING SCALE - C-SSRS
3. HAVE YOU BEEN THINKING ABOUT HOW YOU MIGHT KILL YOURSELF?: NO
5. HAVE YOU STARTED TO WORK OUT OR WORKED OUT THE DETAILS OF HOW TO KILL YOURSELF? DO YOU INTEND TO CARRY OUT THIS PLAN?: NO
6. HAVE YOU EVER DONE ANYTHING, STARTED TO DO ANYTHING, OR PREPARED TO DO ANYTHING TO END YOUR LIFE?: YES
1. IN THE PAST MONTH, HAVE YOU WISHED YOU WERE DEAD OR WISHED YOU COULD GO TO SLEEP AND NOT WAKE UP?: YES
2. HAVE YOU ACTUALLY HAD ANY THOUGHTS OF KILLING YOURSELF IN THE PAST MONTH?: YES
4. HAVE YOU HAD THESE THOUGHTS AND HAD SOME INTENTION OF ACTING ON THEM?: YES

## 2024-10-19 ASSESSMENT — ACTIVITIES OF DAILY LIVING (ADL)
ADLS_ACUITY_SCORE: 40
ADLS_ACUITY_SCORE: 40

## 2024-10-19 NOTE — ED NOTES
10/19/2024  Sadaf Ross 2000     Writer consulted with ED provider, Toni,  on this date at  7:15 AM. It was determined that pt would not benefit from assessment at this time due to Pt unable able to participate in assessment    Patient refused to participate in assessment.      ED will call DEC at [phone] when pt is ready and able to participate in assessment.     OR DEC order has been closed at this time.    Vanda Pinzon, LÁZAROSW

## 2024-10-19 NOTE — ED NOTES
Bed: JNFT14  Expected date: 10/19/24  Expected time: 5:26 AM  Means of arrival: Ambulance  Comments:  Aniya  24 F--anxiety, depression, cooperative, VSS

## 2024-10-19 NOTE — ED PROVIDER NOTES
"EMERGENCY DEPARTMENT ENCOUNTER      NAME: Sadaf Ross  AGE: 24 year old female  YOB: 2000  MRN: [id number]  EVALUATION DATE & TIME: 10/19/2024  5:30 AM    PCP: [facility]    ED PROVIDER: Manfred Muñoz M.D.      Chief Complaint   Patient presents with    Anxiety    Suicidal         FINAL IMPRESSION:  1. Chest pain, unspecified type    2. Emotional distress          ED COURSE & MEDICAL DECISION MAKING:    Pertinent Labs & Imaging studies reviewed below.  All EKGs below represent my independent interpretation.   ED Course as of 10/19/24 0746   Sat Oct 19, 2024   0557 I reviewed the ED care plan for the patient made in 2022   0614 Patient is a 24-year-old woman with history of bipolar disorder, depression, borderline personality disorder, who presents with \"heart pain\" and feeling like she is going to die.  She is given mixed answers to the question if she is suicidal.  She told paramedics note, then when prompted by triage questioning said that yes, but only due to her heart pain.  On my initial evaluation she is not in any specific discomfort, and given her extensive recent workup from inpatient cardiology, and I think this represents a new acute emergent cause, she states that this feels similar to her discomfort in the past.  Screening EKG will be performed otherwise I will have the DEC  talk to her and help with any outpatient therapy appointments if needed.   0645 EKG shows sinus bradycardia with a rate of 57.  No acute ischemic ST or T wave morphology.  Normal axis, normal intervals.  When compared to prior EKG on October 17, 2024, there is no significant change.  Impression: Sinus bradycardia with rate of 57   0714 The patient is refusing to talk to the DEC . Her behavior fits with previously documented visits, and I will discharge her. I encouraged that she reach out to her therapist on Monday if she continues to have emotional distress.       Additional ED Course " Timestamps:  6:09 AM I met with the patient and conducted the initial exam and interview.    Medical Decision Making  Obtained supplemental history:Supplemental history obtained?: No  Reviewed external records: External records reviewed?: Inpatient Record: 10/17/2024 to 10/18/2024 at [facility],  Care impacted by chronic illness:Mental Health and Other: intellectual disability  Care significantly affected by social determinants of health:N/A  Did you consider but not order tests?: Work up considered but not performed and documented in chart, if applicable  Did you interpret images independently?: Independent interpretation of ECG and images noted in documentation, when applicable.  Consultation discussion with other provider: Phone conversation with consultants will be documented in the ED Course  Discharge. No recommendations on prescription strength medication(s). N/A.    Not Applicable    At the conclusion of the encounter I discussed the results of all of the tests and the disposition. The questions were answered. The patient or family acknowledged understanding and was agreeable with the care plan.       MEDICATIONS GIVEN IN THE EMERGENCY:  Medications - No data to display      NEW PRESCRIPTIONS STARTED AT TODAY'S ER VISIT  Discharge Medication List as of 10/19/2024  7:19 AM             =================================================================    HPI    Sadaf Ross is a 24 year old female who presents to this ED for evaluation of anxiety.     Per chart review, on 10/17/2024 to 10/18/2024 at [facility], patient presented with near syncope. Patient was seen by cardiology with echocardiogram normal and no further cardiac workup recommended. Neurology recommended normal EEG and no recommendation of antiepileptic medication. Patient described suicidal ideation and patient was placed on one-to-one and psychiatry stated discharge was okay if patient follows up with psychiatry provider  on October 21. Patient was discharged in good condition.     For the past 24 hours, patient has had a feeling that something bad is going to happen. Last night, she continued to wake up due to these thoughts. Patient admits to abdominal pain, tingling sensation, and slight shortness of breath. Patient has a history of anxiety and depression and feels similar to those episodes. Patient 's trauma therapist is full for a month, so patient has not booked an appointment.      VITALS:  /59   Pulse 51   Temp 98.8  F (37.1  C) (Oral)   Resp 18   Wt 115.7 kg (255 lb)   LMP  (LMP Unknown)   SpO2 99%   BMI 45.17 kg/m      PHYSICAL EXAM    Constitutional: Well developed, well nourished. Comfortable appearing. Keeps eyes closed  HENT: Normocephalic, atraumatic, mucous membranes moist, nose normal  Eyes: PERRL, EOMI, conjunctiva normal, no discharge.  Neck- Supple, gross ROM intact.   Respiratory: Normal work of breathing, normal rate, speaks in full sentences  Cardiovascular: Normal heart rate  Musculoskeletal: Moving all 4 extremities intentionally and without pain.  Neurologic: Alert & oriented x 3, cranial nerves grossly intact. Normal gross coordination  Psychiatric: Affect depressed, disinterested      I, Leah Smith am serving as a scribe to document services personally performed by Dr. Manfred Muñoz based on my observation and the provider's statements to me. I, Manfred Muñoz MD attest that Leah Smith is acting in a scribe capacity, has observed my performance of the services and has documented them in accordance with my direction.    Manfred Muñoz M.D.  Emergency Medicine  [facility] EMERGENCY DEPARTMENT  [facility] [facility] [address]  [phone]  Dept: [phone]       Manfred Muñoz MD  10/19/24 0701

## 2024-10-19 NOTE — ED NOTES
Patient requested a free ride home. I told her we do not normally give rides home to people who come into the ED. She said they did it for her yesterday. So I stated I will see what I can do. She then used profanity against me as she exited the room for the lobby.

## 2024-10-19 NOTE — DISCHARGE INSTRUCTIONS
No signs of heart attack or any life threatening disorder.    PLEASE reach out to your mental health care team on Monday to see if they can fit you in sooner than November.     For acute issues or concerns in the meantime, please call the [facility] Crisis Line: [phone].

## 2024-10-19 NOTE — ED NOTES
DEC called for her. She refused to talk to the . I advised that it would be good for her to talk to them to keep her care moving forward. She still refused. I told her that she cannot simply sleep here in there ED without talking to someone to keep her care moving. She still refused. I advised the provider who is planning to discharge her.

## 2024-10-19 NOTE — ED TRIAGE NOTES
"She was seen here for suicidal thoughts and \"heart pain\" two days ago here. Sent home with all things normal in her testing and exams. She has returned again stating her \"heart pain\" returned last night but she wanted to \"sleep on it and see how it felt in the morning.\" She called for EMS who stated she did not have suicidal thoughts during the transport. However, when I asked her our screening questions she stated she does want to kill herself. I asked her why it changed so fast for her and she stated it is due to the pain. So I asked her if she only wants to kill herself when she has the pain and she says yes.        "

## 2024-10-21 DIAGNOSIS — Z09 HOSPITAL DISCHARGE FOLLOW-UP: ICD-10-CM

## 2024-10-25 ENCOUNTER — HOSPITAL ENCOUNTER (EMERGENCY)
Facility: HOSPITAL | Age: 24
Discharge: HOME | End: 2024-10-25
Admitting: EMERGENCY MEDICINE
Payer: MEDICARE

## 2024-10-25 VITALS
RESPIRATION RATE: 10 BRPM | SYSTOLIC BLOOD PRESSURE: 90 MMHG | HEART RATE: 69 BPM | TEMPERATURE: 98 F | OXYGEN SATURATION: 100 % | WEIGHT: 255 LBS | DIASTOLIC BLOOD PRESSURE: 50 MMHG | BODY MASS INDEX: 45.18 KG/M2 | HEIGHT: 63 IN

## 2024-10-25 DIAGNOSIS — R10.9 ABDOMINAL PAIN: ICD-10-CM

## 2024-10-25 DIAGNOSIS — K62.5 RECTAL BLEEDING: ICD-10-CM

## 2024-10-25 DIAGNOSIS — R07.9 CHEST PAIN: ICD-10-CM

## 2024-10-25 LAB
ALBUMIN SERPL BCG-MCNC: 4.6 G/DL (ref 3.5–5.2)
ALBUMIN UR-MCNC: NEGATIVE MG/DL
ALP SERPL-CCNC: 55 U/L (ref 40–150)
ALT SERPL W P-5'-P-CCNC: 13 U/L (ref 0–50)
ANION GAP SERPL CALCULATED.3IONS-SCNC: 13 MMOL/L (ref 7–15)
APPEARANCE UR: CLEAR
AST SERPL W P-5'-P-CCNC: 13 U/L (ref 0–45)
BILIRUB DIRECT SERPL-MCNC: <0.2 MG/DL (ref 0–0.3)
BILIRUB SERPL-MCNC: <0.2 MG/DL
BILIRUB UR QL STRIP: NEGATIVE
BUN SERPL-MCNC: 12.1 MG/DL (ref 6–20)
CALCIUM SERPL-MCNC: 8.9 MG/DL (ref 8.8–10.4)
CHLORIDE SERPL-SCNC: 105 MMOL/L (ref 98–107)
CLUE CELLS: NORMAL
COLOR UR AUTO: ABNORMAL
CREAT SERPL-MCNC: 0.73 MG/DL (ref 0.51–0.95)
D DIMER PPP FEU-MCNC: <0.27 UG/ML FEU (ref 0–0.5)
EGFRCR SERPLBLD CKD-EPI 2021: >90 ML/MIN/1.73M2
ERYTHROCYTE [DISTWIDTH] IN BLOOD BY AUTOMATED COUNT: 13.5 % (ref 10–15)
GLUCOSE SERPL-MCNC: 89 MG/DL (ref 70–99)
GLUCOSE UR STRIP-MCNC: NEGATIVE MG/DL
HCG UR QL: NEGATIVE
HCO3 SERPL-SCNC: 23 MMOL/L (ref 22–29)
HCT VFR BLD AUTO: 37.1 % (ref 35–47)
HGB BLD-MCNC: 12.3 G/DL (ref 11.7–15.7)
HGB UR QL STRIP: NEGATIVE
KETONES UR STRIP-MCNC: NEGATIVE MG/DL
LEUKOCYTE ESTERASE UR QL STRIP: NEGATIVE
LIPASE SERPL-CCNC: 39 U/L (ref 13–60)
MCH RBC QN AUTO: 27.5 PG (ref 26.5–33)
MCHC RBC AUTO-ENTMCNC: 33.2 G/DL (ref 31.5–36.5)
MCV RBC AUTO: 83 FL (ref 78–100)
MUCOUS THREADS #/AREA URNS LPF: PRESENT /LPF
NITRATE UR QL: NEGATIVE
PH UR STRIP: 6.5 [PH] (ref 5–7)
PLATELET # BLD AUTO: 218 10E3/UL (ref 150–450)
POTASSIUM SERPL-SCNC: 3.7 MMOL/L (ref 3.4–5.3)
PROT SERPL-MCNC: 7.4 G/DL (ref 6.4–8.3)
RBC # BLD AUTO: 4.47 10E6/UL (ref 3.8–5.2)
RBC URINE: 0 /HPF
SODIUM SERPL-SCNC: 141 MMOL/L (ref 135–145)
SP GR UR STRIP: 1.02 (ref 1–1.03)
SQUAMOUS EPITHELIAL: 2 /HPF
TRICHOMONAS, WET PREP: NORMAL
TROPONIN T SERPL HS-MCNC: <6 NG/L
UROBILINOGEN UR STRIP-MCNC: <2 MG/DL
WBC # BLD AUTO: 7.6 10E3/UL (ref 4–11)
WBC URINE: 1 /HPF
WBC'S/HIGH POWER FIELD, WET PREP: NORMAL
YEAST, WET PREP: NORMAL

## 2024-10-25 PROCEDURE — 85379 FIBRIN DEGRADATION QUANT: CPT | Performed by: EMERGENCY MEDICINE

## 2024-10-25 PROCEDURE — 85014 HEMATOCRIT: CPT | Performed by: EMERGENCY MEDICINE

## 2024-10-25 PROCEDURE — 250N000009 HC RX 250: Performed by: EMERGENCY MEDICINE

## 2024-10-25 PROCEDURE — 36415 COLL VENOUS BLD VENIPUNCTURE: CPT | Performed by: EMERGENCY MEDICINE

## 2024-10-25 PROCEDURE — 81025 URINE PREGNANCY TEST: CPT | Performed by: EMERGENCY MEDICINE

## 2024-10-25 PROCEDURE — 82248 BILIRUBIN DIRECT: CPT | Performed by: EMERGENCY MEDICINE

## 2024-10-25 PROCEDURE — 81003 URINALYSIS AUTO W/O SCOPE: CPT | Performed by: EMERGENCY MEDICINE

## 2024-10-25 PROCEDURE — 84484 ASSAY OF TROPONIN QUANT: CPT | Performed by: EMERGENCY MEDICINE

## 2024-10-25 PROCEDURE — 250N000013 HC RX MED GY IP 250 OP 250 PS 637: Performed by: EMERGENCY MEDICINE

## 2024-10-25 PROCEDURE — 87210 SMEAR WET MOUNT SALINE/INK: CPT | Performed by: EMERGENCY MEDICINE

## 2024-10-25 PROCEDURE — 250N000011 HC RX IP 250 OP 636: Performed by: EMERGENCY MEDICINE

## 2024-10-25 PROCEDURE — 96374 THER/PROPH/DIAG INJ IV PUSH: CPT

## 2024-10-25 PROCEDURE — 96375 TX/PRO/DX INJ NEW DRUG ADDON: CPT

## 2024-10-25 PROCEDURE — 83690 ASSAY OF LIPASE: CPT | Performed by: EMERGENCY MEDICINE

## 2024-10-25 PROCEDURE — 82435 ASSAY OF BLOOD CHLORIDE: CPT | Performed by: EMERGENCY MEDICINE

## 2024-10-25 PROCEDURE — 99284 EMERGENCY DEPT VISIT MOD MDM: CPT | Mod: 25

## 2024-10-25 PROCEDURE — 93005 ELECTROCARDIOGRAM TRACING: CPT | Performed by: EMERGENCY MEDICINE

## 2024-10-25 RX ORDER — ACETAMINOPHEN 325 MG/1
975 TABLET ORAL ONCE
Status: COMPLETED | OUTPATIENT
Start: 2024-10-25 | End: 2024-10-25

## 2024-10-25 RX ORDER — ONDANSETRON 2 MG/ML
4 INJECTION INTRAMUSCULAR; INTRAVENOUS ONCE
Status: COMPLETED | OUTPATIENT
Start: 2024-10-25 | End: 2024-10-25

## 2024-10-25 RX ADMIN — PANTOPRAZOLE SODIUM 80 MG: 40 INJECTION, POWDER, FOR SOLUTION INTRAVENOUS at 21:10

## 2024-10-25 RX ADMIN — ACETAMINOPHEN 975 MG: 325 TABLET ORAL at 21:07

## 2024-10-25 RX ADMIN — ONDANSETRON 4 MG: 2 INJECTION INTRAMUSCULAR; INTRAVENOUS at 21:08

## 2024-10-25 ASSESSMENT — ACTIVITIES OF DAILY LIVING (ADL)
ADLS_ACUITY_SCORE: 0
ADLS_ACUITY_SCORE: 0

## 2024-10-26 NOTE — DISCHARGE INSTRUCTIONS
You are seen and evaluated here in the emergency department for your abdominal pain, rectal bleeding, chest pain.  At this time, labs are reassuring.  After discussion you do not want to proceed with CT scan which I feel is reasonable.  Recommend close follow-up with primary care as well as your continued follow-up with your mental health practitioner.  Please continue all your medications as prescribed.  Return with worsening pain, uncontrolled vomiting, severe worsening bleeding or other concerns.

## 2024-10-26 NOTE — ED TRIAGE NOTES
Patient arrives from home. Reports she had a bloody bowel movement at [id number] today. States that blood was mixed with stool. No blood since. Also c/o 9/10 generalized abdominal pain, generalized headache, nausea. Patient reports she had a Covid vaccination about 5 days ago and noticed redness and swelling around injection site, area on right deltoid is red and raised about the size of a half-dollar.     Triage Assessment (Adult)       Row Name 10/25/24 1134          Triage Assessment    Airway WDL WDL        Respiratory WDL    Respiratory WDL WDL        Cardiac WDL    Cardiac WDL X;rhythm     Pulse Rate & Regularity tachycardic;radial pulse regular     Cardiac Rhythm ST        Peripheral/Neurovascular WDL    Peripheral Neurovascular WDL WDL

## 2024-10-26 NOTE — ED PROVIDER NOTES
EMERGENCY DEPARTMENT ENCOUNTER      NAME: Sadaf Ross  AGE: 24 year old female  YOB: 2000  MRN: [id number]  EVALUATION DATE & TIME: No admission date for patient encounter.    PCP: [facility]    ED PROVIDER: Kristina Genao PA-C      Chief Complaint   Patient presents with    Abdominal Pain    Rectal Bleeding    Headache         FINAL IMPRESSION:  1. Abdominal pain    2. Rectal bleeding    3. Chest pain          MEDICAL DECISION MAKING:    Pertinent Labs & Imaging studies reviewed. (See chart for details)  24 year old female presents to the Emergency Department for evaluation of multiple complaints, the main being abdominal pain and rectal bleeding.  Patient has been having abdominal pain for the past several weeks.  Today she had pain with defecation and blood in her stool.  No blood since.  She also started to feel unwell with dizziness and chest discomfort so she called EMS was brought to the emergency department.  On my evaluation, patient vitally stable and overall well-appearing.  Examination with no abdominal tenderness palpation.  No rebound or guarding.  Heart regular rate and rhythm lungs clear to auscultation bilaterally.  Differential diagnosis included UTI, vaginal infection, hemorrhoids, GI bleed, ACS, PE, arrhythmia, extralight abnormality.    UA without signs of infection.  Pregnancy negative.  Wet prep negative.  CBC without derangement.  BMP without derangement.  LFTs without derangement.  Lipase within normal limits.  EKG normal sinus rhythm without any ST or T wave changes concerning for ACS.  Troponin negative and low suspicion for ACS as a cause of chest pain.  D-dimer negative and do not feel PE is the cause of symptoms.  Patient reassured by findings here and after discussion she does not want to have any imaging of her chest or abdomen.  She is a frequent flyer to the emergency department with abdominal pain and chest pain especially recently and with reassuring  workup I feel that she is appropriate for discharge home with close outpatient follow-up.  Patient did voice that she feels depressed however, this is not new for her.  She denies any thoughts of harming herself or anyone else.  She has follow-up with her mental health practitioner on Monday and I told her to keep this appointment.  She takes her depression medications however, has missed 2 days.  I discussed restarting this and she was agreeable.  At this time, I do feel she is appropriate for discharge home with close outpatient follow-up and does not require further workup here in the emergency department.  Patient was in agreement understanding with the plan of care and all questions were to the best my ability.  She was discharged home in stable condition.    Medical Decision Making  Obtained supplemental history:Supplemental history obtained?: No  Reviewed external records: External records reviewed?: Documented in chart  Care impacted by chronic illness:Documented in Chart  Did you consider but not order tests?: Work up considered but not performed and documented in chart, if applicable  Did you interpret images independently?: Independent interpretation of ECG and images noted in documentation, when applicable.  Consultation discussion with other provider:Did you involve another provider (consultant, , pharmacy, etc.)?: No  Discharge. I recommended the patient continue their current prescription strength medication(s): olanzapine, phenergan, synthroid, prozac. See documentation for any additional details.    MIPS: Not Applicable       ED COURSE:  7:57 PM I met with the patient, obtained history, performed an initial exam, and discussed options and plan for diagnostics and treatment here in the ED. performed a rectal exam with chaperone in the room.  9:50 PM Patient discharged after being provided with extensive anticipatory guidance and given return precautions, importance of PCP follow-up  emphasized.    At the conclusion of the encounter I discussed the results of all of the tests and the disposition. The questions were answered. The patient or family acknowledged understanding and was agreeable with the care plan.     MEDICATIONS GIVEN IN THE EMERGENCY:  Medications   ondansetron (ZOFRAN) injection 4 mg (4 mg Intravenous $Given 10/25/24 2108)   pantoprazole (PROTONIX) IV push injection 80 mg (80 mg Intravenous $Given 10/25/24 2110)   acetaminophen (TYLENOL) tablet 975 mg (975 mg Oral $Given 10/25/24 2107)       NEW PRESCRIPTIONS STARTED AT TODAY'S ER VISIT  Discharge Medication List as of 10/25/2024 10:00 PM               =================================================================    HPI:    Patient information was obtained from: Patient    Use of Interpretor: N/A        Sadaf AMAN Ross is a 24 year old female with a pertinent history of depression, bipolar disorder, intellectual disability, and hemorrhoids who presents to this ED via private car with  for evaluation of abdominal pain and other issues.    Per chart review: Patient was seen on 10/17/2024 at [facility] for evaluation of syncopal spells.  Cardiology performed an echocardiogram that was normal.  Neurology with normal EEG.  No antielliptic medication recommended.  Psychiatric provider consulted.  Was told to follow-up with her previously scheduled psychiatric provider on 10/21/2024.  Discharged home with other outpatient follow-up.    Last few weeks, patient endorses having on and off abdominal pain.  Patient describes the pain as being in the center of her abdomen and sharp.  She states that at the time of having worsening abdominal pain she does feel as if she is going to pass out.  Earlier today, patient reports having a bowel movement that had blood mixed in the stool.  Patient does state that she has a history of hemorrhoids, but denies this ever being this severe.    Patient states she feels  aleena here in the emergency department.  She also shares having some dizziness and chest pain as well.  Patient also shares having a vaginal odor.    Shares that she is currently on a Depo shot, so she is not getting her menstrual cycle.  Denies any fever, vomiting, dysuria, hematuria.  Denies taking any pain or nausea medications prior to coming here to the emergency department.  Denies having any concerns for STDs.    Otherwise in normal state health.  No further concerns this time.      REVIEW OF SYSTEMS:  Negative unless otherwise stated in the above HPI.       PAST MEDICAL HISTORY:  Past Medical History:   Diagnosis Date    ADHD (attention deficit hyperactivity disorder)     Bipolar 1 disorder (H)     Borderline personality disorder (H)     Depressive disorder     Intellectual disability     Obesity     Syncope        PAST SURGICAL HISTORY:  Past Surgical History:   Procedure Laterality Date    APPENDECTOMY             CURRENT MEDICATIONS:    No current facility-administered medications for this encounter.    Current Outpatient Medications:     acetaminophen (TYLENOL) 325 MG tablet, Take 650 mg by mouth every 6 hours as needed for mild pain, Disp: , Rfl:     albuterol (PROAIR HFA/PROVENTIL HFA/VENTOLIN HFA) 108 (90 Base) MCG/ACT inhaler, Inhale 1 puff into the lungs every 6 hours as needed for shortness of breath., Disp: , Rfl:     ARIPiprazole lauroxil ER (ARISTADA) 882 MG/3.2ML intra-muscular, Inject 882 mg into the muscle every 28 days On the 22nd of each month, Disp: , Rfl:     benztropine (COGENTIN) 1 MG tablet, Take 1 mg by mouth 2 times daily., Disp: , Rfl:     cetirizine (ZYRTEC) 10 MG tablet, Take 1 tablet by mouth daily., Disp: , Rfl:     cloNIDine (CATAPRES) 0.1 MG tablet, Take 1 tablet by mouth daily, Disp: , Rfl:     clotrimazole (LOTRIMIN) 1 % external cream, Apply topically 2 times daily., Disp: , Rfl:     dicyclomine (BENTYL) 20 MG tablet, Take 20 mg by mouth 2 times daily., Disp: , Rfl:      divalproex sodium extended-release (DEPAKOTE ER) 250 MG 24 hr tablet, Take 250 mg by mouth 2 times daily., Disp: , Rfl:     docusate sodium (COLACE) 50 MG capsule, Take 100 mg by mouth 2 times daily, Disp: , Rfl:     famotidine (PEPCID) 20 MG tablet, Take 20 mg by mouth at bedtime., Disp: , Rfl:     FLUoxetine (PROZAC) 40 MG capsule, Take 80 mg by mouth daily, Disp: , Rfl:     hydrOXYzine (ATARAX) 50 MG tablet, Take 50 mg by mouth 2 times daily as needed for anxiety, Disp: , Rfl:     lactase (LACTAID) 3000 UNIT tablet, Take 1 tablet (3,000 Units) by mouth 3 times daily as needed for indigestion, Disp: 30 tablet, Rfl: 1    levothyroxine (SYNTHROID/LEVOTHROID) 25 MCG tablet, Take 1 tablet (25 mcg) by mouth daily for 30 days, Disp: 30 tablet, Rfl: 0    multivitamin w/minerals (MULTI-VITAMIN) tablet, Take 1 tablet by mouth daily., Disp: , Rfl:     OLANZapine zydis (ZYPREXA) 5 MG ODT, Take 1 tablet (5 mg) by mouth At Bedtime, Disp: 30 tablet, Rfl: 0    ondansetron (ZOFRAN) 8 MG tablet, Take 8 mg by mouth every 8 hours as needed for nausea., Disp: , Rfl:     pantoprazole (PROTONIX) 40 MG EC tablet, Take 40 mg by mouth daily, Disp: , Rfl:     polyethylene glycol (MIRALAX) 17 GM/Dose powder, Take 1 Capful by mouth daily as needed for constipation., Disp: , Rfl:     promethazine (PHENERGAN) 12.5 MG tablet, Take 1 tablet (12.5 mg) by mouth every 6 hours as needed for nausea., Disp: , Rfl:     senna-docusate (SENOKOT-S/PERICOLACE) 8.6-50 MG tablet, Take 1 tablet by mouth 2 times daily as needed., Disp: , Rfl:     sodium chloride 0.65 % nasal spray, Spray 1 spray in nostril daily as needed., Disp: , Rfl:     traZODone (DESYREL) 100 MG tablet, Take 100 mg by mouth at bedtime., Disp: , Rfl:     Vitamin D, Cholecalciferol, 25 MCG (1000 UT) TABS, Take 1,000 Units by mouth daily , Disp: , Rfl:       ALLERGIES:  Allergies   Allergen Reactions    Penicillins Rash and Unknown       FAMILY HISTORY:  Family History   Problem Relation Age  of Onset    Diabetes Type 1 Father     Cancer Paternal Grandfather        SOCIAL HISTORY:   Social History     Socioeconomic History    Marital status: Single   Tobacco Use    Smoking status: Former     Current packs/day: 0.25     Average packs/day: 0.3 packs/day for 5.0 years (1.3 ttl pk-yrs)     Types: Cigarettes, Vaping Device     Passive exposure: Past    Smokeless tobacco: Never   Substance and Sexual Activity    Alcohol use: No    Drug use: Never    Sexual activity: Not Currently     Partners: Female, Male     Birth control/protection: Injection     Social Drivers of Health     Financial Resource Strain: Low Risk  (10/17/2024)    Financial Resource Strain     Within the past 12 months, have you or your family members you live with been unable to get utilities (heat, electricity) when it was really needed?: No   Food Insecurity: Low Risk  (10/17/2024)    Food Insecurity     Within the past 12 months, did you worry that your food would run out before you got money to buy more?: No     Within the past 12 months, did the food you bought just not last and you didn t have money to get more?: No   Transportation Needs: Low Risk  (10/17/2024)    Transportation Needs     Within the past 12 months, has lack of transportation kept you from medical appointments, getting your medicines, non-medical meetings or appointments, work, or from getting things that you need?: No   Physical Activity: Not on File (7/17/2020)    Received from [facility]    Physical Activity     Physical Activity: 0   Stress: Not on File (1/20/2021)    [facility] from [facility]    Stress     Stress: 0   Social Connections: Not on File (1/20/2021)    Received from [facility]    Social Connections     Connectedness: 0   Interpersonal Safety: Not At Risk (9/4/2024)    Received from [facility]     Humiliation, Afraid, Rape, and Kick questionnaire     Fear of Current or Ex-Partner: No     Emotionally Abused: No     Physically Abused: No     Sexually  "Abused: No   Housing Stability: Low Risk  (10/17/2024)    Housing Stability     Do you have housing? : Yes     Are you worried about losing your housing?: No       VITALS:  Patient Vitals for the past 24 hrs:   BP Temp Temp src Pulse Resp SpO2 Height Weight   10/25/24 2200 90/50 -- -- 69 10 100 % -- --   10/25/24 1912 134/83 98  F (36.7  C) Oral 101 18 98 % 1.6 m (5' 3\") 115.7 kg (255 lb)       PHYSICAL EXAM    Constitutional: Well developed, Well nourished, NAD  HENT: Normocephalic, Atraumatic, Bilateral external ears normal, Oropharynx normal, mucous membranes moist, Nose normal.   Neck: Normal range of motion, No tenderness, Supple, No stridor.  Eyes: PERRL, EOMI, Conjunctiva normal, No discharge.   Respiratory: Normal breath sounds, No respiratory distress, No wheezing, Speaks full sentences easily. No cough.  Cardiovascular: Normal heart rate, Regular rhythm, No murmurs, No rubs, No gallops. Chest wall nontender.  GI: Soft, No tenderness, No masses, No flank tenderness. No rebound or guarding.  Rectal: No bleeding, hemorrhoids appreciated.   Musculoskeletal: 2+ DP pulses. No edema. No cyanosis, No clubbing. Good range of motion in all major joints. No tenderness to palpation or major deformities noted. No tenderness of the CTLS spine.   Integument: Warm, Dry, No erythema, No rash. No petechiae.  Neurologic: Alert & oriented x 3, Normal motor function, Normal sensory function, No focal deficits noted. Normal gait.  Psychiatric: Affect normal, Judgment normal, Mood normal. Cooperative.    LAB:  All pertinent labs reviewed and interpreted.  Recent Results (from the past 24 hours)   UA with Microscopic reflex to Culture    Collection Time: 10/25/24  7:40 PM    Specimen: Urine, Midstream   Result Value Ref Range    Color Urine Light Yellow Colorless, Straw, Light Yellow, Yellow    Appearance Urine Clear Clear    Glucose Urine Negative Negative mg/dL    Bilirubin Urine Negative Negative    Ketones Urine Negative " Negative mg/dL    Specific Gravity Urine 1.021 1.001 - 1.030    Blood Urine Negative Negative    pH Urine 6.5 5.0 - 7.0    Protein Albumin Urine Negative Negative mg/dL    Urobilinogen Urine <2.0 <2.0 mg/dL    Nitrite Urine Negative Negative    Leukocyte Esterase Urine Negative Negative    Mucus Urine Present (A) None Seen /LPF    RBC Urine 0 <=2 /HPF    WBC Urine 1 <=5 /HPF    Squamous Epithelials Urine 2 (H) <=1 /HPF   HCG qualitative urine    Collection Time: 10/25/24  7:40 PM   Result Value Ref Range    hCG Urine Qualitative Negative Negative   Wet prep    Collection Time: 10/25/24  8:57 PM    Specimen: Vagina; Swab   Result Value Ref Range    Trichomonas Absent Absent    Yeast Absent Absent    Clue Cells Absent Absent    WBCs/high power field None None   CBC (+ platelets, no diff)    Collection Time: 10/25/24  9:03 PM   Result Value Ref Range    WBC Count 7.6 4.0 - 11.0 10e3/uL    RBC Count 4.47 3.80 - 5.20 10e6/uL    Hemoglobin 12.3 11.7 - 15.7 g/dL    Hematocrit 37.1 35.0 - 47.0 %    MCV 83 78 - 100 fL    MCH 27.5 26.5 - 33.0 pg    MCHC 33.2 31.5 - 36.5 g/dL    RDW 13.5 10.0 - 15.0 %    Platelet Count 218 150 - 450 10e3/uL   Basic metabolic panel    Collection Time: 10/25/24  9:03 PM   Result Value Ref Range    Sodium 141 135 - 145 mmol/L    Potassium 3.7 3.4 - 5.3 mmol/L    Chloride 105 98 - 107 mmol/L    Carbon Dioxide (CO2) 23 22 - 29 mmol/L    Anion Gap 13 7 - 15 mmol/L    Urea Nitrogen 12.1 6.0 - 20.0 mg/dL    Creatinine 0.73 0.51 - 0.95 mg/dL    GFR Estimate >90 >60 mL/min/1.73m2    Calcium 8.9 8.8 - 10.4 mg/dL    Glucose 89 70 - 99 mg/dL   Hepatic function panel    Collection Time: 10/25/24  9:03 PM   Result Value Ref Range    Protein Total 7.4 6.4 - 8.3 g/dL    Albumin 4.6 3.5 - 5.2 g/dL    Bilirubin Total <0.2 <=1.2 mg/dL    Alkaline Phosphatase 55 40 - 150 U/L    AST 13 0 - 45 U/L    ALT 13 0 - 50 U/L    Bilirubin Direct <0.20 0.00 - 0.30 mg/dL   Lipase    Collection Time: 10/25/24  9:03 PM   Result  Value Ref Range    Lipase 39 13 - 60 U/L   Troponin T, High Sensitivity    Collection Time: 10/25/24  9:03 PM   Result Value Ref Range    Troponin T, High Sensitivity <6 <=14 ng/L   D dimer quantitative    Collection Time: 10/25/24  9:03 PM   Result Value Ref Range    D-Dimer Quantitative <0.27 0.00 - 0.50 ug/mL FEU         RADIOLOGY:  Reviewed all pertinent imaging. Please see official radiology report.  No orders to display       PROCEDURES:   None.       I, Susie Albrecht, am serving as a scribe to document services personally performed by Kristina Genao PA-C based on my observation and the provider's statements to me. I, Kristina Genao PA-C attest that Susie Albrecht is acting in a scribe capacity, has observed my performance of the services and has documented them in accordance with my direction.    Kristina Genao PA-C  Emergency Medicine  [facility]  10/25/2024       Kristina Genao PA-C  10/25/24 1010

## 2024-10-27 LAB
ATRIAL RATE - MUSE: 86 BPM
DIASTOLIC BLOOD PRESSURE - MUSE: 56 MMHG
INTERPRETATION ECG - MUSE: NORMAL
P AXIS - MUSE: 64 DEGREES
PR INTERVAL - MUSE: 184 MS
QRS DURATION - MUSE: 80 MS
QT - MUSE: 344 MS
QTC - MUSE: 411 MS
R AXIS - MUSE: 74 DEGREES
SYSTOLIC BLOOD PRESSURE - MUSE: 117 MMHG
T AXIS - MUSE: 22 DEGREES
VENTRICULAR RATE- MUSE: 86 BPM

## 2024-10-28 ENCOUNTER — HOSPITAL ENCOUNTER (EMERGENCY)
Facility: HOSPITAL | Age: 24
Discharge: HOME | End: 2024-10-28
Attending: EMERGENCY MEDICINE | Admitting: EMERGENCY MEDICINE
Payer: MEDICARE

## 2024-10-28 VITALS
OXYGEN SATURATION: 99 % | WEIGHT: 246 LBS | TEMPERATURE: 98.4 F | BODY MASS INDEX: 43.58 KG/M2 | DIASTOLIC BLOOD PRESSURE: 56 MMHG | RESPIRATION RATE: 22 BRPM | HEART RATE: 86 BPM | SYSTOLIC BLOOD PRESSURE: 111 MMHG

## 2024-10-28 DIAGNOSIS — K59.00 CONSTIPATION, UNSPECIFIED CONSTIPATION TYPE: ICD-10-CM

## 2024-10-28 DIAGNOSIS — R07.9 CHEST PAIN, UNSPECIFIED TYPE: ICD-10-CM

## 2024-10-28 PROCEDURE — 93005 ELECTROCARDIOGRAM TRACING: CPT | Performed by: STUDENT IN AN ORGANIZED HEALTH CARE EDUCATION/TRAINING PROGRAM

## 2024-10-28 PROCEDURE — 99283 EMERGENCY DEPT VISIT LOW MDM: CPT

## 2024-10-28 PROCEDURE — 93005 ELECTROCARDIOGRAM TRACING: CPT | Performed by: EMERGENCY MEDICINE

## 2024-10-28 ASSESSMENT — COLUMBIA-SUICIDE SEVERITY RATING SCALE - C-SSRS
1. IN THE PAST MONTH, HAVE YOU WISHED YOU WERE DEAD OR WISHED YOU COULD GO TO SLEEP AND NOT WAKE UP?: NO
6. HAVE YOU EVER DONE ANYTHING, STARTED TO DO ANYTHING, OR PREPARED TO DO ANYTHING TO END YOUR LIFE?: NO
6. HAVE YOU EVER DONE ANYTHING, STARTED TO DO ANYTHING, OR PREPARED TO DO ANYTHING TO END YOUR LIFE?: NO
2. HAVE YOU ACTUALLY HAD ANY THOUGHTS OF KILLING YOURSELF IN THE PAST MONTH?: NO
1. IN THE PAST MONTH, HAVE YOU WISHED YOU WERE DEAD OR WISHED YOU COULD GO TO SLEEP AND NOT WAKE UP?: NO
2. HAVE YOU ACTUALLY HAD ANY THOUGHTS OF KILLING YOURSELF IN THE PAST MONTH?: NO

## 2024-10-28 ASSESSMENT — ACTIVITIES OF DAILY LIVING (ADL): ADLS_ACUITY_SCORE: 0

## 2024-10-28 NOTE — ED TRIAGE NOTES
Patient reports the following upon wakening up from a nap she reports that she has abdominal and bilateral flank pain, sob, chest pain, nausea, weakness, light headedness and headache. EKG called for in triage.

## 2024-10-29 NOTE — ED PROVIDER NOTES
"EMERGENCY DEPARTMENT ENCOUNTER      NAME: Sadaf Ross  AGE: 24 year old female  YOB: 2000  MRN: [id number]  EVALUATION DATE & TIME: 10/28/2024  6:57 PM    [facility]    ED PROVIDER: Manfred Muñoz M.D.      Chief Complaint   Patient presents with    Chest Pain    Abdominal Pain    Headache    Shortness of Breath    Flank Pain         FINAL IMPRESSION:  1. Chest pain, unspecified type    2. Constipation, unspecified constipation type          ED COURSE & MEDICAL DECISION MAKING:    Pertinent Labs & Imaging studies reviewed below.  All EKGs below represent my independent interpretation.      Patient is a 24-year-old woman with history of bipolar disorder, borderline personality disorder, ADHD with prior care plan for frequent ED visits who presents with \"seizure\".  She reports that this happens when she initiates somebody.  She is awake and alert here.  Speech is clear.  No tongue lacerations.  She also reports some chest pain and some abdominal pain.  Abdominal pain has been persistent for a while and contributes this to constipation, but she has not started MiraLAX.  EKG non-ischemic, not high risk for ACS with her current complaints (do not need troponin).    Additional ED Course Timestamps:  7:19 PM I met the patient and performed my initial interview and exam. Discussed workup in the emergency department, management of symptoms, and likely disposition.      Medical Decision Making  Obtained supplemental history:Supplemental history obtained?: No  Reviewed external records: External records reviewed?: Documented in chart  Care impacted by chronic illness:Mental Health  Care significantly affected by social determinants of health:N/A  Did you consider but not order tests?: Work up considered but not performed and documented in chart, if applicable  Did you interpret images independently?: Independent interpretation of ECG and images noted in documentation, when " applicable.  Consultation discussion with other provider: Phone conversation with consultants will be documented in the ED Course  Discharge. I recommended the patient continue their current prescription strength medication(s): miralax. N/A.    Not Applicable    At the conclusion of the encounter I discussed the results of all of the tests and the disposition. The questions were answered. The patient or family acknowledged understanding and was agreeable with the care plan.       MEDICATIONS GIVEN IN THE EMERGENCY:  Medications - No data to display      NEW PRESCRIPTIONS STARTED AT TODAY'S ER VISIT  Discharge Medication List as of 10/28/2024  7:30 PM             =================================================================    HPI    Sadaf Ross is a 24 year old female who presents to this ED for evaluation of chest pain and abdominal pain.    The patient states that she had a seizure-like episode this morning that was triggered by stress and anxiety. She reports a similar spell yesterday and notes that these spells usually last about 20 minutes. Since the seizure-like episode today, she reports chest pain and generalized abdominal pain that are both slightly improving. Patient also notes 3 days of constipation. She has MiraLAX at home and plans to start taking it tomorrow.    She does see a therapist regularly but missed her appointment today because she was not feeling well. She has an appointment with her PCP scheduled for 10/31/24.     She denies vision changes or any other concerns at this time.       VITALS:  /56   Pulse 86   Temp 98.4  F (36.9  C) (Temporal)   Resp 22   Wt 111.6 kg (246 lb)   LMP  (LMP Unknown)   SpO2 99%   BMI 43.58 kg/m      PHYSICAL EXAM    Constitutional: Well developed, well nourished. Comfortable appearing.   HENT: Normocephalic, atraumatic, mucous membranes moist, nose normal  Eyes: PERRL, EOMI, conjunctiva normal, no discharge.  Neck- Supple, gross ROM intact.    Respiratory: Normal work of breathing, normal rate, speaks in full sentences  Cardiovascular: Normal heart rate  Musculoskeletal: Moving all 4 extremities intentionally and without pain.  Neurologic: Alert & oriented x 3, cranial nerves grossly intact. Normal gross coordination  Psychiatric: Affect normal, cooperative.      I, Jaden Scott am serving as a scribe to document services personally performed by Dr. Manfred Muñoz based on my observation and the provider's statements to me. I, Manfred Muñoz MD attest that Jaden LUIZA Sonia is acting in a scribe capacity, has observed my performance of the services and has documented them in accordance with my direction.    Manfred Muñoz M.D.  Emergency Medicine  [facility] EMERGENCY DEPARTMENT  [facility] [facility] [id number] [phone]  Dept: [phone]       Manfred Muñoz MD  10/28/24 3097

## 2024-10-30 LAB
ATRIAL RATE - MUSE: 85 BPM
DIASTOLIC BLOOD PRESSURE - MUSE: NORMAL MMHG
INTERPRETATION ECG - MUSE: NORMAL
P AXIS - MUSE: 51 DEGREES
PR INTERVAL - MUSE: 172 MS
QRS DURATION - MUSE: 84 MS
QT - MUSE: 356 MS
QTC - MUSE: 423 MS
R AXIS - MUSE: 44 DEGREES
SYSTOLIC BLOOD PRESSURE - MUSE: NORMAL MMHG
T AXIS - MUSE: 49 DEGREES
VENTRICULAR RATE- MUSE: 85 BPM

## 2024-10-31 ENCOUNTER — LAB REQUISITION (OUTPATIENT)
Dept: LAB | Facility: CLINIC | Age: 24
End: 2024-10-31
Payer: MEDICARE

## 2024-10-31 DIAGNOSIS — Z13.220 ENCOUNTER FOR SCREENING FOR LIPOID DISORDERS: ICD-10-CM

## 2024-10-31 DIAGNOSIS — Z11.3 ENCOUNTER FOR SCREENING FOR INFECTIONS WITH A PREDOMINANTLY SEXUAL MODE OF TRANSMISSION: ICD-10-CM

## 2024-10-31 DIAGNOSIS — E03.9 HYPOTHYROIDISM, UNSPECIFIED: ICD-10-CM

## 2024-10-31 LAB — HIV 1+2 AB+HIV1 P24 AG SERPL QL IA: NONREACTIVE

## 2024-10-31 PROCEDURE — 87491 CHLMYD TRACH DNA AMP PROBE: CPT | Mod: ORL | Performed by: INTERNAL MEDICINE

## 2024-10-31 PROCEDURE — 87591 N.GONORRHOEAE DNA AMP PROB: CPT | Mod: ORL | Performed by: INTERNAL MEDICINE

## 2024-11-01 ENCOUNTER — HOSPITAL ENCOUNTER (OUTPATIENT)
Facility: CLINIC | Age: 24
Setting detail: OBSERVATION
Discharge: HOME | End: 2024-11-02
Attending: EMERGENCY MEDICINE | Admitting: EMERGENCY MEDICINE
Payer: MEDICARE

## 2024-11-01 DIAGNOSIS — R45.851 SUICIDAL IDEATION: ICD-10-CM

## 2024-11-01 PROBLEM — F31.9 BIPOLAR AFFECTIVE DISORDER (H): Status: ACTIVE | Noted: 2024-11-01

## 2024-11-01 LAB
C TRACH DNA SPEC QL NAA+PROBE: NEGATIVE
CHOLEST SERPL-MCNC: 135 MG/DL
FASTING STATUS PATIENT QL REPORTED: ABNORMAL
HDLC SERPL-MCNC: 40 MG/DL
LDLC SERPL CALC-MCNC: 62 MG/DL
N GONORRHOEA DNA SPEC QL NAA+PROBE: NEGATIVE
NONHDLC SERPL-MCNC: 95 MG/DL
T PALLIDUM AB SER QL: NONREACTIVE
TRIGL SERPL-MCNC: 166 MG/DL
TSH SERPL DL<=0.005 MIU/L-ACNC: 1.86 UIU/ML (ref 0.3–4.2)

## 2024-11-01 PROCEDURE — G0378 HOSPITAL OBSERVATION PER HR: HCPCS

## 2024-11-01 PROCEDURE — 99285 EMERGENCY DEPT VISIT HI MDM: CPT | Performed by: EMERGENCY MEDICINE

## 2024-11-01 PROCEDURE — 250N000013 HC RX MED GY IP 250 OP 250 PS 637: Performed by: EMERGENCY MEDICINE

## 2024-11-01 RX ORDER — ACETAMINOPHEN 500 MG
1000 TABLET ORAL ONCE
Status: COMPLETED | OUTPATIENT
Start: 2024-11-01 | End: 2024-11-01

## 2024-11-01 RX ADMIN — ACETAMINOPHEN 1000 MG: 500 TABLET ORAL at 20:21

## 2024-11-01 ASSESSMENT — ACTIVITIES OF DAILY LIVING (ADL)
ADLS_ACUITY_SCORE: 0

## 2024-11-01 NOTE — ED PROVIDER NOTES
[facility] EMERGENCY DEPARTMENT ([facility])    11/01/24          History     Chief Complaint   Patient presents with    Suicidal     EMS [facility] 645     The history is provided by the patient and medical records. No  was used.     Sadaf Ross is a 24 year old female who with PMH of bipolar disorder, borderline personality disorder, ADHD with prior care plan for frequent ED visits who presents with SI.     As per triage note,   BIBA for SI with plan to overdose on pills. She is feeling hopeless.  She wants medications readjusted, per EMS.     Patient states that she woke up this morning feeling like wanting to overdose on her medications.  She had only taken her morning medication dosage as she should not be taking no more than what she is supposed to take.  She is trying to hurt herself as she wants to feel the pain, but does not want to cut herself or anything like that.  Patient did get admitted to hospital for epileptic seizures.  Patient's last seizure was a couple of days ago.  She was at [facility] when it happened.  She does not take any medications for it.  Patient does take psychiatric medications but does not remember what the medications are.  Patient has auditory hallucinations and she is hearing voices to overdose.      Also, patient has been having headache, abdominal pain, and chest pain.  Patient states that even her mind and her soul hurts.  Patient called 911 was her mother was at work.  Patient has dry mouth and states that she needs to constantly keep drinking something.  [facility] put her on a diet and stated that there should be also no caffeine due to her epilepsy.  Patient states that she is shaky right now due to her nervousness and anxiousness.      Patient states that she was angry at everyone in her group home as they were not giving her her medications.  She has homicidal ideation and she states that she is willing to go to snf and is not afraid to  "do anything if they do keep doing that to her.  Patient is with her mom currently as she does not live in the group home anymore. Patient moved back on September 14 with her mother.     As per Epic review:  Patient presented to the [facility] ED on 10/28/2024 due to \"seizure\".  She reported that this happens when she initiates somebody.  She also reported some chest pain and some abdominal pain.  Abdominal pain has been persistent for a while and contributes this to constipation, but she has not started MiraLAX.  EKG non-ischemic, not high risk for ACS with her current complaints (do not need troponin).         Past Medical History  Past Medical History:   Diagnosis Date    ADHD (attention deficit hyperactivity disorder)     Bipolar 1 disorder (H)     Borderline personality disorder (H)     Depressive disorder     Intellectual disability     Obesity     Syncope      Past Surgical History:   Procedure Laterality Date    APPENDECTOMY       acetaminophen (TYLENOL) 325 MG tablet  albuterol (PROAIR HFA/PROVENTIL HFA/VENTOLIN HFA) 108 (90 Base) MCG/ACT inhaler  ARIPiprazole lauroxil ER (ARISTADA) 882 MG/3.2ML intra-muscular  benztropine (COGENTIN) 1 MG tablet  cloNIDine (CATAPRES) 0.1 MG tablet  clotrimazole (LOTRIMIN) 1 % external cream  dicyclomine (BENTYL) 20 MG tablet  divalproex sodium extended-release (DEPAKOTE ER) 250 MG 24 hr tablet  docusate sodium (COLACE) 50 MG capsule  famotidine (PEPCID) 20 MG tablet  FLUoxetine (PROZAC) 40 MG capsule  hydrOXYzine (ATARAX) 50 MG tablet  lactase (LACTAID) 3000 UNIT tablet  levothyroxine (SYNTHROID/LEVOTHROID) 25 MCG tablet  multivitamin w/minerals (MULTI-VITAMIN) tablet  OLANZapine zydis (ZYPREXA) 5 MG ODT  ondansetron (ZOFRAN) 8 MG tablet  pantoprazole (PROTONIX) 40 MG EC tablet  polyethylene glycol (MIRALAX) 17 GM/Dose powder  promethazine (PHENERGAN) 12.5 MG tablet  senna-docusate (SENOKOT-S/PERICOLACE) 8.6-50 MG tablet  sodium chloride 0.65 % " nasal spray  traZODone (DESYREL) 100 MG tablet  Vitamin D, Cholecalciferol, 25 MCG (1000 UT) TABS      Allergies   Allergen Reactions    Penicillins Rash and Unknown     Family History  Family History   Problem Relation Age of Onset    Diabetes Type 1 Father     Cancer Paternal Grandfather      Social History   Social History     Tobacco Use    Smoking status: Former     Current packs/day: 0.25     Average packs/day: 0.3 packs/day for 5.0 years (1.3 ttl pk-yrs)     Types: Cigarettes, Vaping Device     Passive exposure: Past    Smokeless tobacco: Never   Substance Use Topics    Alcohol use: No    Drug use: Never      Past medical history, past surgical history, medications, allergies, family history, and social history were reviewed with the patient. No additional pertinent items.   A complete review of systems was performed with pertinent positives and negatives noted in the HPI, and all other systems negative.    Physical Exam   BP: 126/72  Pulse: 72  Temp: 98.2  F (36.8  C)  Resp: 16  SpO2: 99 %  Physical Exam  Vitals and nursing note reviewed.   Constitutional:       General: She is not in acute distress.     Appearance: Normal appearance. She is not diaphoretic.   HENT:      Head: Atraumatic.      Mouth/Throat:      Mouth: Mucous membranes are moist.   Eyes:      General: No scleral icterus.     Conjunctiva/sclera: Conjunctivae normal.   Cardiovascular:      Rate and Rhythm: Normal rate.      Heart sounds: Normal heart sounds.   Pulmonary:      Effort: No respiratory distress.      Breath sounds: Normal breath sounds.   Abdominal:      General: Abdomen is flat.   Musculoskeletal:      Cervical back: Neck supple.   Skin:     General: Skin is warm.      Findings: No rash.   Neurological:      General: No focal deficit present.      Mental Status: She is alert.      Motor: No weakness.   Psychiatric:         Thought Content: Thought content includes suicidal ideation. Thought content does not include homicidal  ideation. Thought content does not include homicidal or suicidal plan.         ED Course, Procedures, & Data    6:23 PM  The patient was seen and examined by Ramo Pacheco MD. in [facility]    Procedures            No results found for any visits on 11/01/24.  Medications   acetaminophen (TYLENOL) tablet 1,000 mg (1,000 mg Oral $Given 11/1/24 2021)   ondansetron (ZOFRAN ODT) ODT tab 8 mg (8 mg Oral $Given 11/2/24 1010)   OLANZapine zydis (zyPREXA) ODT tab 10 mg (10 mg Oral $Given 11/2/24 1248)     Labs Ordered and Resulted from Time of ED Arrival to Time of ED Departure - No data to display  No orders to display          Critical care was not performed.     Medical Decision Making  The patient's presentation was of moderate complexity (a chronic illness mild to moderate exacerbation, progression, or side effect of treatment).    The patient's evaluation involved:  ordering and/or review of 3+ test(s) in this encounter (see separate area of note for details)    The patient's management necessitated moderate risk (prescription drug management including medications given in the ED).    Assessment & Plan    25 yo female here with acute on chronic suicidal thoughts. She comes to this ED often. Previously she would call 911 from her group home, but she states she moved in with her mom a month and a half ago. She called 911 after her mom left for work and she was left alone.    She was evaluated by the DEC  and they agreed with the concern that she has access to her meds and her plan would be to overdose on them. She previously did not have access to her meds. The DEC  recommended that we obs he and reevaluate for disposition in the morning.    I have reviewed the nursing notes. I have reviewed the findings, diagnosis, plan and need for follow up with the patient.    Discharge Medication List as of 11/2/2024  2:40 PM          Final diagnoses:   Suicidal ideation   ISAM, am serving as a  trained medical scribe to document services personally performed by Ramo Pacheco MD, based on the provider's statements to me.     I, Ramo Pacheco MD, was physically present and have reviewed and verified the accuracy of this note documented by SAM DURAN.     Ramo Pacheco MD.  [facility] EMERGENCY DEPARTMENT  11/1/2024     Ramo Pacheco MD  11/03/24 1046

## 2024-11-01 NOTE — ED NOTES
Bed: UREDH-G  Expected date:   Expected time:   Means of arrival:   Comments:  Isai 645  24F  ANNIKA Vega

## 2024-11-01 NOTE — ED TRIAGE NOTES
BIBA for SI with plan to overdose on pills.  Feeling hopeless.    Wants medications readjusted, per EMS.

## 2024-11-02 ENCOUNTER — HOSPITAL ENCOUNTER (EMERGENCY)
Facility: CLINIC | Age: 24
Discharge: HOME | End: 2024-11-02
Attending: FAMILY MEDICINE | Admitting: FAMILY MEDICINE
Payer: MEDICARE

## 2024-11-02 VITALS
RESPIRATION RATE: 16 BRPM | TEMPERATURE: 98.3 F | SYSTOLIC BLOOD PRESSURE: 130 MMHG | HEART RATE: 76 BPM | DIASTOLIC BLOOD PRESSURE: 81 MMHG | OXYGEN SATURATION: 98 %

## 2024-11-02 DIAGNOSIS — F79 INTELLECTUAL DISABILITY: Chronic | ICD-10-CM

## 2024-11-02 DIAGNOSIS — F60.3 BORDERLINE PERSONALITY DISORDER (H): Chronic | ICD-10-CM

## 2024-11-02 DIAGNOSIS — F41.9 ANXIETY: ICD-10-CM

## 2024-11-02 PROCEDURE — 99284 EMERGENCY DEPT VISIT MOD MDM: CPT | Performed by: FAMILY MEDICINE

## 2024-11-02 PROCEDURE — 250N000013 HC RX MED GY IP 250 OP 250 PS 637: Performed by: FAMILY MEDICINE

## 2024-11-02 PROCEDURE — 250N000013 HC RX MED GY IP 250 OP 250 PS 637: Performed by: EMERGENCY MEDICINE

## 2024-11-02 PROCEDURE — 99238 HOSP IP/OBS DSCHRG MGMT 30/<: CPT | Performed by: FAMILY MEDICINE

## 2024-11-02 PROCEDURE — 250N000011 HC RX IP 250 OP 636: Performed by: FAMILY MEDICINE

## 2024-11-02 PROCEDURE — 99285 EMERGENCY DEPT VISIT HI MDM: CPT | Performed by: FAMILY MEDICINE

## 2024-11-02 PROCEDURE — G0378 HOSPITAL OBSERVATION PER HR: HCPCS

## 2024-11-02 RX ORDER — DICYCLOMINE HCL 20 MG
20 TABLET ORAL 2 TIMES DAILY
Status: DISCONTINUED | OUTPATIENT
Start: 2024-11-02 | End: 2024-11-02 | Stop reason: HOSPADM

## 2024-11-02 RX ORDER — CLONIDINE HYDROCHLORIDE 0.1 MG/1
0.1 TABLET ORAL DAILY
Status: DISCONTINUED | OUTPATIENT
Start: 2024-11-02 | End: 2024-11-02 | Stop reason: HOSPADM

## 2024-11-02 RX ORDER — OLANZAPINE 10 MG/1
10 TABLET, ORALLY DISINTEGRATING ORAL ONCE
Status: COMPLETED | OUTPATIENT
Start: 2024-11-02 | End: 2024-11-02

## 2024-11-02 RX ORDER — PANTOPRAZOLE SODIUM 40 MG/1
40 TABLET, DELAYED RELEASE ORAL DAILY
Status: DISCONTINUED | OUTPATIENT
Start: 2024-11-02 | End: 2024-11-02 | Stop reason: HOSPADM

## 2024-11-02 RX ORDER — LEVOTHYROXINE SODIUM 25 UG/1
25 TABLET ORAL DAILY
Status: DISCONTINUED | OUTPATIENT
Start: 2024-11-02 | End: 2024-11-02 | Stop reason: HOSPADM

## 2024-11-02 RX ORDER — DIVALPROEX SODIUM 250 MG/1
250 TABLET, FILM COATED, EXTENDED RELEASE ORAL 2 TIMES DAILY
Status: DISCONTINUED | OUTPATIENT
Start: 2024-11-02 | End: 2024-11-02 | Stop reason: HOSPADM

## 2024-11-02 RX ORDER — MULTIPLE VITAMINS W/ MINERALS TAB 9MG-400MCG
1 TAB ORAL DAILY
Status: DISCONTINUED | OUTPATIENT
Start: 2024-11-02 | End: 2024-11-02 | Stop reason: HOSPADM

## 2024-11-02 RX ORDER — ONDANSETRON 8 MG/1
8 TABLET, ORALLY DISINTEGRATING ORAL ONCE
Status: COMPLETED | OUTPATIENT
Start: 2024-11-02 | End: 2024-11-02

## 2024-11-02 RX ADMIN — PANTOPRAZOLE SODIUM 40 MG: 40 TABLET, DELAYED RELEASE ORAL at 09:36

## 2024-11-02 RX ADMIN — LEVOTHYROXINE SODIUM 25 MCG: 25 TABLET ORAL at 13:49

## 2024-11-02 RX ADMIN — DIVALPROEX SODIUM 250 MG: 250 TABLET, EXTENDED RELEASE ORAL at 13:54

## 2024-11-02 RX ADMIN — DICYCLOMINE HYDROCHLORIDE 20 MG: 20 TABLET ORAL at 13:49

## 2024-11-02 RX ADMIN — CLONIDINE HYDROCHLORIDE 0.1 MG: 0.1 TABLET ORAL at 09:37

## 2024-11-02 RX ADMIN — Medication 1 TABLET: at 13:48

## 2024-11-02 RX ADMIN — FLUOXETINE HYDROCHLORIDE 40 MG: 20 CAPSULE ORAL at 13:48

## 2024-11-02 RX ADMIN — OLANZAPINE 10 MG: 10 TABLET, ORALLY DISINTEGRATING ORAL at 12:48

## 2024-11-02 RX ADMIN — ONDANSETRON 8 MG: 8 TABLET, ORALLY DISINTEGRATING ORAL at 10:10

## 2024-11-02 ASSESSMENT — ACTIVITIES OF DAILY LIVING (ADL)
ADLS_ACUITY_SCORE: 0

## 2024-11-02 ASSESSMENT — COLUMBIA-SUICIDE SEVERITY RATING SCALE - C-SSRS
6. HAVE YOU EVER DONE ANYTHING, STARTED TO DO ANYTHING, OR PREPARED TO DO ANYTHING TO END YOUR LIFE?: YES
3. HAVE YOU BEEN THINKING ABOUT HOW YOU MIGHT KILL YOURSELF?: YES
1. SINCE LAST CONTACT, HAVE YOU WISHED YOU WERE DEAD OR WISHED YOU COULD GO TO SLEEP AND NOT WAKE UP?: YES
1. IN THE PAST MONTH, HAVE YOU WISHED YOU WERE DEAD OR WISHED YOU COULD GO TO SLEEP AND NOT WAKE UP?: YES
REASONS FOR IDEATION SINCE LAST CONTACT: EQUALLY TO GET ATTENTION, REVENGE, OR A REACTION FROM OTHERS AND TO END/STOP THE PAIN
TOTAL  NUMBER OF INTERRUPTED ATTEMPTS SINCE LAST CONTACT: NO
5. HAVE YOU STARTED TO WORK OUT OR WORKED OUT THE DETAILS OF HOW TO KILL YOURSELF? DO YOU INTEND TO CARRY OUT THIS PLAN?: YES
TOTAL  NUMBER OF ABORTED OR SELF INTERRUPTED ATTEMPTS SINCE LAST CONTACT: YES
2. HAVE YOU ACTUALLY HAD ANY THOUGHTS OF KILLING YOURSELF?: YES
LETHALITY/MEDICAL DAMAGE CODE MOST LETHAL ACTUAL ATTEMPT: NO PHYSICAL DAMAGE OR VERY MINOR PHYSICAL DAMAGE
SUICIDE, SINCE LAST CONTACT: NO
4. HAVE YOU HAD THESE THOUGHTS AND HAD SOME INTENTION OF ACTING ON THEM?: NO
5. HAVE YOU STARTED TO WORK OUT OR WORKED OUT THE DETAILS OF HOW TO KILL YOURSELF? DO YOU INTEND TO CARRY OUT THIS PLAN?: YES
LETHALITY/MEDICAL DAMAGE CODE MOST LETHAL POTENTIAL ATTEMPT: BEHAVIOR NOT LIKELY TO RESULT IN INJURY
2. HAVE YOU ACTUALLY HAD ANY THOUGHTS OF KILLING YOURSELF IN THE PAST MONTH?: YES
ATTEMPT SINCE LAST CONTACT: YES
6. HAVE YOU EVER DONE ANYTHING, STARTED TO DO ANYTHING, OR PREPARED TO DO ANYTHING TO END YOUR LIFE?: NO

## 2024-11-02 NOTE — DISCHARGE INSTRUCTIONS
Indications for return to Emergency Department or to seek further assistance:  Thoughts of harm to self or others that you feel you may act on  Thoughts of suicide  Feeling unsafe  Major changes in mood, sleep or apetite  Difficulty concentrating or completing regular daily tasks/ functions  Feelings of paranoia, or feelings that you are losing touch with reality  Resources for Mental Health:  *(asterisk indicates free service)    *National Suicide Prevention Lifeline  1-678.540.7636     *[facility]'s National Help Line  (Treatment Referral Routing for Mental & Chemical Health)  1-897.416.1617      *[facility]- [facility]  [address] [place], [place]  [phone]  Mon, Wed, & Fri 1PM-3PM  Mon, Tues, Wed, & Thu 630PM-830PM  (no appointment needed)    *[facility]  [address], [place], [place]  Tue & Thu 6PM-8PM  (no appointment needed)    *[address], [place], [place]  [phone]  [email or web address]      [facility] Crisis Lines    [facility] Crisis Line  [phone] [facility] Crisis Line  [phone]   [facility] Crisis Line  [phone] [facility] Crisis Line  [phone]   [facility] Crisis Line  [phone] [facility] Crisis Line  [phone] for 8AM-430PM  [phone] for 430PM-8AM   [facility] Crisis Line  [phone] [facility] Crisis Line  [phone]     Outpatient Mental Health Clinics   *[facility]  [address] [place], [place]  [phone] *[facility]  [address] [place], [place]  [phone]    *[facility]  [address] [place], [place]  [phone] Health Counseling Services  [address] [place], [place]  [phone]    [facility]  [address]  [place], [place]  [phone] [facility]  [address]  [place], [place]  [phone]    [facility]  [address] [place], [place]  [phone] [facility]  [address]  [place], [place]  [phone]   [facility]  [address] [place], [place]  [phone] [facility]  [address]  [place], [place]   [phone]   [facility]  [address]  [facility]   [phone] Behavioral [facility] and Well[facility] Clinic  [address] [address] [phone]   *[facility] Crisis: COPE: (827.262.9709) 24 hour mobile crisis support for people having a mental health crisis in [facility].   *Acute Psychiatric Services ([phone]). 24-hour walk-in crisis psychiatric support at [facility]; Emergency Medications Clinic available 7:30am - 2:00pm  *Crisis Connection: ([phone]) 24-hour confidential telephone counseling   *[facility] Emergency Room: [phone]  *[place] [facility] ([facility]) : [facility] connects people seeking recovery to resources that help foster and sustain long-term recovery. Whether you are seeking resources for treatment, transportation, housing, job training, education, health or other pathways to recovery, [facility] is a great place to start. [phone] [email or web address]

## 2024-11-02 NOTE — CONSULTS
"Diagnostic Evaluation Consultation  Crisis Assessment    Patient Name: Sadaf Ross  Age:  24 year old  Legal Sex: female  Gender Identity: female  Pronouns:   Race: White  Ethnicity: Not  or   Language: English      Patient was assessed: In person   Crisis Assessment Start Date: 11/01/24  Crisis Assessment Start Time: 1845  Crisis Assessment Stop Time: 1930  Patient location: [facility] EMERGENCY DEPARTMENT  [facility]    Referral Data and Chief Complaint  Sadaf Rsos presents to the ED via EMS. Patient is presenting to the ED for the following concerns: Suicidal ideation.   Factors that make the mental health crisis life threatening or complex are:  Pt presents to [facility] ED via EMS from home for a mental health assessment due to concerns for suicidal ideation with a plan to overdose on her medication. At the time of assessment, pt reports she feels \"like shit.\" She reports she is done living. She is no longer residing at her group home and has been living with her mom and sister since September 14th. She reports this is going \"alright.\" Reports she woke up this morning having suicidal thoughts and she has continued to have the thoughts all day. Reports her mom and sister are both at work so she didn't have anyone to talk to. She is tearful and states \"I'm sorry I'm acting this way. I just wasn't getting the attention from someone I needed.\" Pt observed sobbing heavily throughout assessment. She reports she misses her father who passed away 4 years ago and she wants to be in heaven with him. She reports the anniversary of his death is \"coming up soon. On February 17th.\" She does admit her father wouldn't want her to hurt herself and she has a lot of other family who care for her. Pt yelled \"take me out of my misery, God.\" States life is too stressful and everything hurts. States she just wants to run out into traffic. Reports she is hearing AH of her father telling her it is okay and " "he will see her when she \"gets up here.\" Pt reported thoughts to hurt her former group home staff or beat them up, but denied any HI or thoughts of aggression to [facility]. She reports she bit herself and hit her head on the way in this evening \"because that's my routine when I come to this hospital.\" She reports she just wants to talk to her mom, but she can't until her mom goes on her next break at 9:45 pm. Pt reports she has had difficulty with her appetite and sleep, reports both have been up and down. Reports she has an appointment on 11/6 to talk with her provider about changing her medication as she reports she can't currently keep them down without vomiting them back up. Pt was willing and able to engage in deep breathing exercises to regulate her throughout assessment as needed. Reports her medications are not working and they all \"make me feel like shit.\" Reports she has been working with her therapist \"talking about my trauma\" and sees them once or twice per week.    Informed Consent and Assessment Methods  Explained the crisis assessment process, including applicable information disclosures and limits to confidentiality, assessed understanding of the process, and obtained consent to proceed with the assessment.  Assessment methods included conducting a formal interview with patient, review of medical records, collaboration with medical staff, and obtaining relevant collateral information from family and community providers when available.  : done     Patient response to interventions: acceptance expressed, verbalizes understanding  Coping skills were attempted to reduce the crisis:  Pt reports she likes to play basketball and play games on her phone - especially word finds and crossword puzzles. Able to engage in breathing exercises at the time of assessment.     History of the Crisis   Pt has a hx of frequent ED visits and chronic SI. Has an ED care plan. Pt has a hx of intellectual disability, borderline " personality disorder, bipolar affective disorder, ADHD and PTSD. Hx of NSSI via biting herself, headbanging or scratching herself. Pt was residing at a group home, but moved on in September and has been living with her mother since. Pt has long struggled with the loss of her father and has presented to the ED on multiple occasions when she is very sad and reports she is missing him. Pt has a hx of struggling to utilize her coping skills before coming to the ED or calling 911, but is able to engage in this today and identify her other family supports she still has. Reports she has an established outpatient psychiatrist and her next appointment with them is Nov. 6th. Reports she has a therapist she sees once or twice a week and she has to call to schedule an appointment with him. Reports she saw her PCP yesterday. Has another appointment on Nov. 5th to get her vision tested.    Brief Psychosocial History  Family:  Single, Children no  Support System:  Parent(s), Sibling(s), Grandparent(s), Other (specify) (uncle and nephew)  Employment Status:  disabled  Source of Income:  disability  Financial Environmental Concerns:  none  Current Hobbies:  television/movies/videos, puzzles, music, group/social activities, games  Barriers in Personal Life:  mental health concerns, cognitive limitations, emotional concerns    Significant Clinical History  Current Anxiety Symptoms:  anxious, excessive worry  Current Depression/Trauma:  thoughts of death/suicide, excessive guilt, sadness, hopelessness, crying or feels like crying, low self esteem  Current Somatic Symptoms:  anxious, excessive worry  Current Psychosis/Thought Disturbance:  impulsive  Current Eating Symptoms:  loss of appetite  Chemical Use History:  Alcohol: None  Benzodiazepines: None  Opiates: None  Cocaine: None  Marijuana: None  Other Use: None  Withdrawal Symptoms:  (NA)  Addictions:  (none currently reported)   Past diagnosis:  ADHD, Anxiety Disorder, Bipolar  "Disorder, Depression, Personality Disorder, PTSD  Family history:  Anxiety Disorder, Depression  Past treatment:  Individual therapy, Case management, Psychiatric Medication Management, Inpatient Hospitalization, Supportive Living Environment (group home, [facility] house, etc), Primary Care  Details of most recent treatment:  Hx of group home placement, but has been home since Sept. 14th. Has OP therapy and psychiatry. Pt may also have a  .  Other relevant history:  Patient has not been inpatient in over 5 years.     Collateral Information  Is there collateral information: Yes     Collateral information name, relationship, phone number:  Yarely Ross, mother, [phone]    What happened today: Mom reports she is aware pt is in the ED as pt had called her earlier from the ED. She reports pt spoke with her PCA and  this AM. She reports pt seemed totally fine when she left for work. Reports a number of stressors - reports pt is upset with her former  staff. Reports they would withhold her meds or take her things from her and not give it back. Reports pt is wanting to try living on her own, and is stressed about this. Reports little things adding up. Reports she was staying at a friend's house after the . Reports it sounded like no one was helping her with anything at the group home, and treating her very poorly. Doesn't handle stress very well and panicks. Doesn't know how to deal with it. Will have a hard time breathing, feeling achey. Will feel dizzy, lightheaded or like she needs to pass out. Big thing is she misses her dad. Reports, \"I don't wanna say this in the wrong way, but seems to be taking after him last few years prior to his death.\" Reports she is seeing a trend of that. Talked with other daughter tonight. She has a 1.5 yr old son. Supposedly they aren't getting along. Dogs have been nipping. People not getting along at home. Welcome to return home if she wants to. Has " nowhere else to go right now. [place] them talking about assisted living apartments with her  today. Reports she is not sure how that works, but reports it may be helpful to connect with the  if pt has their contact info. Reports pt can't keep calling and going to the ED for the littlest things. Getting a little tiring. Live in [facility] and  noticing a trend of too many visits to the home. First time mom has heard her say she wants to OD. Never follows through. Reports pt is always saying she is suicidal. She is not sure if pt is scared to act on this or what may happen. Reports pt does have access to her medication at home and takes her meds herself. Reports they are in a weekly pill organizer.     What is different about patient's functioning: See above     Has patient made comments about wanting to kill themselves/others: yes    If d/c is recommended, can they take part in safety/aftercare planning:  yes     Risk Assessment  Clemons Suicide Severity Rating Scale Full Clinical Version:  Suicidal Ideation  Q6 Suicide Behavior (Lifetime): no  Clemons Suicide Severity Rating Scale Recent:   Suicidal Ideation (Recent)  Q1 Wished to be Dead (Past Month): yes  Q2 Suicidal Thoughts (Past Month): yes  Q3 Suicidal Thought Method: yes  Q4 Suicidal Intent without Specific Plan: no  Q5 Suicide Intent with Specific Plan: yes  Level of Risk per Screen: high risk  Intensity of Ideation (Recent)  Most Severe Ideation Rating (Past 1 Month): 4  Frequency (Past 1 Month): Once a week  Duration (Past 1 Month): More than 8 hours/persistent or continuous  Controllability (Past 1 Month): Does not attempt to control thoughts  Deterrents (Past 1 Month): Uncertain that deterrents stopped you  Reasons for Ideation (Past 1 Month): Equally to get attention, revenge, or a reaction from others and to end/stop the pain  Suicidal Behavior (Recent)  Actual Attempt (Past 3 Months): No  Total Number of Actual  "Attempts (Past 3 Months): 0  Has subject engaged in non-suicidal self-injurious behavior? (Past 3 Months): Yes (reports she hit her head and bit herself on the way in to the ED this evening \"because that's my routine when I come to this hospital\")  Interrupted Attempts (Past 3 Months): No  Total Number of Interrupted Attempts (Past 3 Months): 0  Aborted or Self-Interrupted Attempt (Past 3 Months): No  Total Number of Aborted or Self-Interrupted Attempts (Past 3 Months): 0  Preparatory Acts or Behavior (Past 3 Months): No  Total Number of Preparatory Acts (Past 3 Months): 0    Environmental or Psychosocial Events: impulsivity/recklessness, loss of a loved one, helplessness/hopelessness  Protective Factors: Protective Factors: strong bond to family unit, community support, or employment, responsibilities and duties to others, including pets and children, lives in a responsibly safe and stable environment, help seeking, supportive ongoing medical and mental health care relationships, reality testing ability, able to access care without barriers, constructive use of leisure time, enjoyable activities, resilience    Does the patient have thoughts of harming others? Feels Like Hurting Others: no  Previous Attempt to Hurt Others: no  Current presentation:  (tearful but cooperative)  Is the patient engaging in sexually inappropriate behavior?: no    Is the patient engaging in sexually inappropriate behavior?  no        Mental Status Exam   Affect: Appropriate  Appearance: Appropriate  Attention Span/Concentration: Attentive  Eye Contact: Engaged    Fund of Knowledge: Appropriate   Language /Speech Content: Fluent  Language /Speech Volume: Normal  Language /Speech Rate/Productions: Normal  Recent Memory: Intact  Remote Memory: Intact  Mood: Sad, Anxious, Depressed  Orientation to Person: Yes   Orientation to Place: Yes  Orientation to Time of Day: Yes  Orientation to Date: Yes     Situation (Do they understand why they are " here?): Yes  Psychomotor Behavior: Normal  Thought Content: Suicidal  Thought Form: Intact    Medication  Psychotropic medications:   Medication Orders - Psychiatric (From admission, onward)      None          No current facility-administered medications for this encounter.     Current Outpatient Medications   Medication Sig Dispense Refill    acetaminophen (TYLENOL) 325 MG tablet Take 650 mg by mouth every 6 hours as needed for mild pain      albuterol (PROAIR HFA/PROVENTIL HFA/VENTOLIN HFA) 108 (90 Base) MCG/ACT inhaler Inhale 1 puff into the lungs every 6 hours as needed for shortness of breath.      ARIPiprazole lauroxil ER (ARISTADA) 882 MG/3.2ML intra-muscular Inject 882 mg into the muscle every 28 days On the 22nd of each month      benztropine (COGENTIN) 1 MG tablet Take 1 mg by mouth 2 times daily.      cloNIDine (CATAPRES) 0.1 MG tablet Take 1 tablet by mouth daily      clotrimazole (LOTRIMIN) 1 % external cream Apply topically 2 times daily.      dicyclomine (BENTYL) 20 MG tablet Take 20 mg by mouth 2 times daily.      divalproex sodium extended-release (DEPAKOTE ER) 250 MG 24 hr tablet Take 250 mg by mouth 2 times daily.      docusate sodium (COLACE) 50 MG capsule Take 100 mg by mouth 2 times daily      famotidine (PEPCID) 20 MG tablet Take 20 mg by mouth at bedtime.      FLUoxetine (PROZAC) 40 MG capsule Take 80 mg by mouth daily      hydrOXYzine (ATARAX) 50 MG tablet Take 50 mg by mouth 2 times daily as needed for anxiety      lactase (LACTAID) 3000 UNIT tablet Take 1 tablet (3,000 Units) by mouth 3 times daily as needed for indigestion 30 tablet 1    levothyroxine (SYNTHROID/LEVOTHROID) 25 MCG tablet Take 1 tablet (25 mcg) by mouth daily for 30 days 30 tablet 0    multivitamin w/minerals (MULTI-VITAMIN) tablet Take 1 tablet by mouth daily.      OLANZapine zydis (ZYPREXA) 5 MG ODT Take 1 tablet (5 mg) by mouth At Bedtime 30 tablet 0    ondansetron (ZOFRAN) 8 MG tablet Take 8 mg by mouth every 8 hours  as needed for nausea.      pantoprazole (PROTONIX) 40 MG EC tablet Take 40 mg by mouth daily      polyethylene glycol (MIRALAX) 17 GM/Dose powder Take 1 Capful by mouth daily as needed for constipation.      promethazine (PHENERGAN) 12.5 MG tablet Take 1 tablet (12.5 mg) by mouth every 6 hours as needed for nausea.      senna-docusate (SENOKOT-S/PERICOLACE) 8.6-50 MG tablet Take 1 tablet by mouth 2 times daily as needed.      sodium chloride 0.65 % nasal spray Spray 1 spray in nostril daily as needed.      traZODone (DESYREL) 100 MG tablet Take 100 mg by mouth at bedtime.      Vitamin D, Cholecalciferol, 25 MCG (1000 UT) TABS Take 1,000 Units by mouth daily         Current Care Team  Patient Care Team:  [facility] as PCP - General (Clinic)  Chloe Alva APRN CNP as Nurse Practitioner (OB/Gyn)  Seble Jones PA-C as Physician Assistant  Fidel Neely MD as Assigned Heart and Vascular Provider    Diagnosis  Patient Active Problem List   Diagnosis Code    MENTAL HEALTH     Suicidal ideation R45.851    Suicidal ideations R45.851    Intellectual disability F79    Bipolar affective disorder, currently depressed, moderate (H) F31.32    Borderline personality disorder (H) F60.3    Morbid obesity (H) E66.01    Unspecified mood (affective) disorder (H) F39    Chronic constipation K59.09    History of suicide attempt Z91.51    Hyperhidrosis R61    Major depressive disorder, recurrent, in full remission (H) F33.42    Vitamin D deficiency E55.9    Syncope R55    Seizure-like activity (H) R56.9    Syncope, unspecified syncope type R55    Bipolar affective disorder (H) F31.9       Primary Problem This Admission  Active Hospital Problems    Bipolar affective disorder (H) F31.9      Borderline personality disorder (H)  F60.3      Intellectual disability  F79      Suicidal ideation  R45.851    Clinical Summary and Substantiation of Recommendations   A lower level of care has been unsuccessful in treating and  stabilizing patient s mental health symptoms due to suicidal thoughts with a plan to overdose. Pt is not currently able to engage in safety planning. Although pt's SI is chronic in nature, there is additional risk as pt is no longer residing in her group home and has been living at home with her mother since September. She has access to her medications and does NOT have 24/7 supervision. Per pt's mother, pt's  may be looking into assisted living options. With brief observation, monitored therapeutic treatment, and intervention of mental health symptoms in the ED, symptoms may be mitigated with potential for disposition to a less restrictive level of care than an inpatient setting. Patient is not currently on the inpatient worklist. Next steps in care include reassessment in the morning. Pt's mother reports she is able to return home if pt would like to and feels safe to continue following up with her current outpatient providers after period of observation. Pt could benefit from crisis bed placement for a period of stabilization. No beds are currently available tonight. If pt continues to endorse SI and does not feel she can be safe at home, could continue to explore if any beds open up tomorrow.                          Patient coping skills attempted to reduce the crisis:  Pt reports she likes to play basketball and play games on her phone - especially word finds and crossword puzzles. Able to engage in breathing exercises at the time of assessment.    Disposition  Recommended disposition: Observation        Reviewed case and recommendations with attending provider. Attending Name: Dr. Pacheco       Attending concurs with disposition: yes       Patient and/or validated legal guardian concurs with disposition:   yes       Final disposition:  observation    Legal status on admission: Voluntary/Patient has signed consent for treatment    Assessment Details   Total duration spent with the patient: 45 min      CPT code(s) utilized: 07843 - Psychotherapy for Crisis - 60 (30-74*) min    CHERI Brown, Psychotherapist  [facility] - Triage & Transition Services  Callback: [phone]

## 2024-11-02 NOTE — ED NOTES
Patient restless and walking around saying she wants to die. Patient redirected to bed and this writer discussed her self talk. Inquired how the patient can learn to talk positive as if they want to live on their own they have to figure out how to navigate difficult emotions.   Patient then said she likes games and wanted to play cards.

## 2024-11-02 NOTE — PROGRESS NOTES
"Triage and Transition Services Extended Care Reassessment     Patient: Sadaf goes by \"Sadaf,\" uses she/her pronouns  Date of Service: November 2, 2024  Site of Service: [facility] EMERGENCY DEPARTMENT                               Patient was seen yes  Mode of Assessment: In person     Reason for Reassessment: other (see comment)    History of Patient's Original Emergency Room Encounter: Change in disposition. Patient has stabilized to baseline in the ED and is expressing anability to return home. Patient is a chronic ED user of resources with a history of intellectual disability, ADHD, PTSD, and Bipolar disorder. She has a history of NSSIB. Recently aa group home resident, patient is now living with her mother at her home in [place]. Patient continues to struggle with managing her emotions, among other triggers is the death of her father several years ago. She forrester have a  PsyD appointment upcoming on Nov 6 and is actively engaging with her therapists. She was able to go over her Stanely Brown Safety Plan prior to discharging.    Current Patient Presentation: Patient initially presented as unwilling to engage with . When re approached, patient was playing cards with her RN and willing to briefly talk. Patient was presented with the option of going to a crisis bed placement or return home, and patient settled on returning home. Patient's mother was contacted and agreed with this plan. Sadaf presents as someone who has issues with maintaining workable coping skills as she is a frequent visitor to the ER and decompensates easily. When presented with the option of a crisis bed or return home patient responded positively to home. Although patient endorses continued SI, this is baseline for her. It is management of this she struggles with and needs to work on with her established providers. Patient's emergency presentation has returned to baseline and patient can discharge at this " time.    Presentation Summary: Patient initially rejected effort to reassess for potential discharge saying she remained suicidal. Follow up with patient revealed her playing cards in the ED. A conversation was had with patient's mother and found she was welcomed to return home. Went over the Zoran Jaramillo Safety Plan established with patient from her prior presentation, and a copy was provided to the patient for reference. Patient agrees to return home at this time.    Changes Observed Since Initial Assessment: decrease in presenting symptoms    Therapeutic Interventions Provided: Engaged in safety planning, Identified and practiced coping skills., Explored strategies for self-soothing.    Current Symptoms: racing thoughts, excessive worry, anxious negativistic, impaired decision making, irritable, thoughts of death/suicide, apathy racing thoughts, excessive worry, anxious impulsive      Mental Status Exam   Affect: Appropriate  Appearance: Appropriate  Attention Span/Concentration: Attentive  Eye Contact: Engaged    Fund of Knowledge: Appropriate   Language /Speech Content: Fluent  Language /Speech Volume: Normal  Language /Speech Rate/Productions: Normal  Recent Memory: Intact  Remote Memory: Intact  Mood: Anxious, Sad, Depressed  Orientation to Person: Yes   Orientation to Place: Yes  Orientation to Time of Day: Yes  Orientation to Date: Yes     Situation (Do they understand why they are here?): Yes  Psychomotor Behavior: Normal  Thought Content: Suicidal (At baseline.)  Thought Form: Intact    Treatment Objective(s) Addressed: rapport building, identifying and practicing coping strategies, identifying an appropriate aftercare plan, assessing safety    Patient Response to Interventions: acceptance expressed, verbalizes understanding    Progress Towards Goals:  Patient Reports Symptoms Are: improving  Patient Progress Toward Goals: is making progress  Next Step to Work Toward Discharge: patient ability to engage  in safety planning  Ability to Engage Comment: Patient is able to go over her Zoran Jaramillo Safety Plan, engage in further safety planning.    Case Management: Case Management Included: collaborating with patient's support system  Details on Collaborating with Patient's Support System: Spoke with patient's mother, Yarely Ross, who was available for all calls today. Willing to have patient return home when she is ready.  Summary of Interaction: Patient agrees to return to moms home, patient's home, following stabilization in the [facility] ED. Went over patient safety plan and provided a copy to the patient, secured transportation home following consult with her RN and attending MD, all whom are in agreement with plan for discharge.    C-SSRS Since Last Contact:   1. Wish to be Dead (Since Last Contact): Yes  Wish to be Dead Description (Since Last Contact): wants to die at baseline.  2. Non-Specific Active Suicidal Thoughts (Since Last Contact): Yes  3. Active Suicidal Ideation with any Methods (Not Plan) Without Intent to Act (Since Last Contact): Yes  4. Active Suicidal Ideation with Some Intent to Act, Without Specific Plan (Since Last Contact): Yes  5. Active Suicidal Ideation with Specific Plan and Intent (Since Last Contact): Yes  Most Severe Ideation Rating (Since Last Contact): 3  Description of Most Severe Ideation (Since Last Contact): wants to die.  Frequency (Since Last Contact): Daily or almost daily  Duration (Since Last Contact): Fleeting, few seconds or minutes  Controllability (Since Last Contact): Can control thoughts with a lot of difficulty  Deterrents (Since Last Contact): Deterrents probably stopped you  Reasons for Ideation (Since Last Contact): Equally to get attention, revenge, or a reaction from others and to end/stop the pain  Actual Attempt (Since Last Contact): Yes  Actual Attempt Description (Since Last Contact): Head banging and biting self.  Has subject engaged in non-suicidal  self-injurious behavior? (Since Last Contact): Yes  Interrupted Attempts (Since Last Contact): No  Aborted or Self-Interrupted Attempt (Since Last Contact): Yes  Preparatory Acts or Behavior (Since Last Contact): No  Suicide (Since Last Contact): No  Actual Lethality/Medical Damage Code (Most Lethal Attempt): No physical damage or very minor physical damage  Potential Lethality Code (Most Lethal Attempt): Behavior not likely to result in injury  Calculated C-SSRS Risk Score (Since Last Contact): High Risk    Plan: Final Disposition / Recommended Care Path: discharge  Plan for Care reviewed with assigned Medical Provider: yes  Plan for Care Team Review: provider  Patient and/or validated legal guardian concurs: yes  Clinical Substantiation: Patient has been a group home resident for years. She is currently living with her mother at her house in [place]. Levels of care, from IP placement, to outpatient supports, to ongoing therapy and continued skill acquisition, patient continues attempts to manage emotions. Patient tends to use the ED for stabilization and a return to feeling like she is able to return to the environment from where she came, having managed to reduce to baseline her presenting SI and SIB if any in that presentation. Patient's mother continues to search for placement options as well as members of her care team. Patient will followup with her established care team regarding this recent ED presentation, and continue to explore better ways of self management. Patient was offered a placement option at a crisis bed shelter, or return home. Patient chose to return home. Confirmed with mom she is on board with this disposition. Patient, patient's mother, ED attending MD, RN, and MH  are in agreement for discharge at this time.    Legal Status: Legal Status at Admission: Voluntary/Patient has signed consent for treatment    Session Status: Time session started: 1400  Time session ended:  1430  Session Duration (minutes): 30 minutes  Session Number: 1  Anticipated number of sessions or this episode of care: 1  Date of most recent diagnostic assessment: 11/02/24    Session Start Time: 1400  Session Stop Time: 1430  CPT codes: 28502 - Psychotherapy (with patient) - 30 (16-37*) min  Time Spent: 30 minutes      CPT code(s) utilized: 51233 - Psychotherapy (with patient) - 30 (16-37*) min    Diagnosis:   Patient Active Problem List   Diagnosis Code    MENTAL HEALTH     Suicidal ideation R45.851    Suicidal ideations R45.851    Intellectual disability F79    Bipolar affective disorder, currently depressed, moderate (H) F31.32    Borderline personality disorder (H) F60.3    Morbid obesity (H) E66.01    Unspecified mood (affective) disorder (H) F39    Chronic constipation K59.09    History of suicide attempt Z91.51    Hyperhidrosis R61    Major depressive disorder, recurrent, in full remission (H) F33.42    Vitamin D deficiency E55.9    Syncope R55    Seizure-like activity (H) R56.9    Syncope, unspecified syncope type R55    Bipolar affective disorder (H) F31.9       Primary Problem This Admission: Active Hospital Problems    Bipolar affective disorder (H)      *Borderline personality disorder (H)      Intellectual disability      Suicidal ideation        Mario Andrew MA [facility]  Licensed Mental Health Professional (LMHP), [facility]  [phone]

## 2024-11-02 NOTE — ED PROVIDER NOTES
ED Observation Discharge Summary  [facility]  Discharge Date: 2024    Sadaf Ross MRN: [id number]   Age: 24 year old YOB: 2000     Brief HPI & Initial ED Course     Chief Complaint   Patient presents with    Suicidal     EMS [facility] 645     HPI  Sadaf Ross is a 24 year old female with PMH notable for bipolar disorder, borderline personality disorder, ADHD, frequent ED presentation with care plan in place who presented to the ED with a psychiatric concern.  Patient reported that she was feeling hopeless and suicidal.  She was thinking about overdosing on pills.  Reports she is no longer living in a group home, instead living with her mother.  She often feels triggered at this time of year and has been thinking about her father who  several years ago..      The patient was evaluated in the emergency department by a physician and DEC behavioral health . The DEC  recommended observation status to allow the patient time to return to emotional baseline, as the patient has a history of frequent presentation under similar circumstances, and often becomes easily emotionally dysregulated but then can return to baseline with meds and rest. See separate DEC note from this encounter for details on the assessment. The patient's psychiatric state was such that she would benefit from ongoing monitoring. Observation care was initiated with the plan including serial assessments of psychiatric condition, potential administration of medications if indicated, and further disposition pending the patient's psychiatric course during the monitoring period.     See ED Observation H&P for further details on the patient's presenting history and initial evaluation.     Physical Exam   BP: 126/72  Pulse: 72  Temp: 98.2  F (36.8  C)  Resp: 16  SpO2: 99 %    Physical Exam  General: . Appears stated age.   HENT: MMM, no oropharyngeal lesions  Eyes: PERRL, normal sclerae    Cardio: Regular rate, extremities well perfused  Resp: Normal work of breathing, normal respiratory rate  Neuro: alert and fully oriented. CN II-XII grossly intact. Grossly normal strength and sensation in all extremities.   MSK: no deformities.   Integumentary/Skin: no rash visualized, normal color  Psych: Calm affect, cooperative behavior.  Admits to chronic suicidal ideation but denies any active intent for SI.  Denies HI.  Denies hallucinations. Thought process logical. Insight fair.     Results      Procedures                    Labs Ordered and Resulted from Time of ED Arrival to Time of ED Departure - No data to display         Observation Course   The patient was found to have a psychiatric condition that would benefit from an observation stay in the emergency department for further psychiatric stabilization and/or coordination of a safe disposition. The plan upon observation admission included serial assessments of psychiatric condition, potential administration of medications if indicated, further disposition pending the patient's psychiatric course during the monitoring period.     Serial assessments of the patient's psychiatric condition were performed. Nursing notes were reviewed. During the observation period, the patient did require medications for agitation, and did not require restraints/seclusion for patient and/or provider safety.      The patient was also seen by the [facility] , please refer to their extensive note/evaluation which was reviewed with me and is documented in EPIC on 11/2/2024 for further details.  The patient has a history of becoming easily mostly dysregulated, has low frustration tolerance, becomes fixated on various somatoform complaints, and also frequently seeks comfort in the emergency department.  The patient was kept overnight, had Zyprexa for anxiety as well as the regular medications.  The patient appears to have returned to their normal baseline level  of functioning.  The patient is high risk for similar frequent occurrences due to their previous diagnosis of behavior, however based on the entire clinical scenario inpatient hospitalization is felt to be very unlikely to mitigate risk or help this patient's acute and chronic mental health symptoms.  As the patient has now returned to baseline, they appear appropriate to be discharged home with outpatient supports in place.    After the period in observation care, the patient's circumstances and mental state were safe for outpatient management. After counseling on the diagnosis, work-up, and treatment plan, the patient was discharged. Close follow-up with regular outpatient providers including a psychiatrist and/or therapist was recommended and community psychiatric resources were provided. Patient is to return to the ED if any urgent or potentially life-threatening concerns.      Discharge Diagnoses:   Final diagnoses:   Suicidal ideation       --  Leo Lowe MD  [facility] EMERGENCY DEPARTMENT  11/2/2024      Leo Lowe MD  11/02/24 [id number]

## 2024-11-02 NOTE — ED NOTES
Patient reports she has headaches and nausea due to her acid reflux everyday.   Patient offered and given ice pack, ginger ale, crackers, and lavender.   After nurse finished providing care patient started putting wrist in her mouth and appears to seeking attention. No harm or breaks in skin. Patient remains on 1:1.   This writer trying to reinforce good behaviors and not acknowledge attention seeking.   Patient given 2 meds to start and will reassess how she tolerates them.

## 2024-11-02 NOTE — PLAN OF CARE
Sadaf Ross  November 1, 2024  Plan of Care Hand-off Note     Patient Care Path: observation    Plan for Care:   A lower level of care has been unsuccessful in treating and stabilizing patient s mental health symptoms due to suicidal thoughts with a plan to overdose. Pt is not currently able to engage in safety planning. Although pt's SI is chronic in nature, there is additional risk as pt is no longer residing in her group home and has been living at home with her mother since September. She has access to her medications and does NOT have 24/7 supervision. Per pt's mother, pt's  may be looking into assisted living options. With brief observation, monitored therapeutic treatment, and intervention of mental health symptoms in the ED, symptoms may be mitigated with potential for disposition to a less restrictive level of care than an inpatient setting. Patient is not currently on the inpatient worklist. Next steps in care include reassessment in the morning. Pt's mother reports she is able to return home if pt would like to and feels safe to continue following up with her current outpatient providers after period of observation. Pt could benefit from crisis bed placement for a period of stabilization. No beds are currently available tonight. If pt continues to endorse SI and does not feel she can be safe at home, could continue to explore if any beds open up tomorrow.    Identified Goals and Safety Issues: stabilization and reduction of symptoms    Overview:  Yarely Ross, mother, [phone]            Legal Status: Legal Status at Admission: Voluntary/Patient has signed consent for treatment    Psychiatry Consult: Yes       Updated MD regarding plan of care.           CHERI Brown

## 2024-11-02 NOTE — ED NOTES
Attempted to meet with patient on the [facility] in [facility]. She refused initially to engage with  saying she was still suicidal. Returned soon after talking with mother who is willing to have patient return home, and patient is now willing to return home in lieu of a crisis bed placement that was offered. Will transport patient to mom's house at:    [address], [place] [address]    Mom and patient have both agreed to this plan.

## 2024-11-03 ENCOUNTER — TELEPHONE (OUTPATIENT)
Dept: BEHAVIORAL HEALTH | Facility: CLINIC | Age: 24
End: 2024-11-03
Payer: MEDICARE

## 2024-11-03 NOTE — ED PROVIDER NOTES
ED Provider Note  [facility]      History     Chief Complaint   Patient presents with    Suicidal     Suicidal ideation: No plan reported. EMS reports that patient is biting herself. Secondary c/o dizziness,  No injury     HPI  Sadaf Ross is a 24 year old female with history of presents for bipolar disorder, borderline personality disorder, ADHD, frequent ED presentation with care plan in place who presents to the ED via EMS with suicidal ideation.  No plan was reported.  EMS reported that patient is biting herself.  Patient was observed here for several hours in the emergency room she stopped doing self-injurious behaviors and stated that she was feeling ready to go home at this time she is not suicidal or homicidal or wanting to do any self injury and will be discharged to her home at her request.    Past Medical History  Past Medical History:   Diagnosis Date    ADHD (attention deficit hyperactivity disorder)     Bipolar 1 disorder (H)     Borderline personality disorder (H)     Depressive disorder     Intellectual disability     Obesity     Syncope      Past Surgical History:   Procedure Laterality Date    APPENDECTOMY       acetaminophen (TYLENOL) 325 MG tablet  albuterol (PROAIR HFA/PROVENTIL HFA/VENTOLIN HFA) 108 (90 Base) MCG/ACT inhaler  ARIPiprazole lauroxil ER (ARISTADA) 882 MG/3.2ML intra-muscular  benztropine (COGENTIN) 1 MG tablet  cloNIDine (CATAPRES) 0.1 MG tablet  clotrimazole (LOTRIMIN) 1 % external cream  dicyclomine (BENTYL) 20 MG tablet  divalproex sodium extended-release (DEPAKOTE ER) 250 MG 24 hr tablet  docusate sodium (COLACE) 50 MG capsule  famotidine (PEPCID) 20 MG tablet  FLUoxetine (PROZAC) 40 MG capsule  hydrOXYzine (ATARAX) 50 MG tablet  lactase (LACTAID) 3000 UNIT tablet  levothyroxine (SYNTHROID/LEVOTHROID) 25 MCG tablet  multivitamin w/minerals (MULTI-VITAMIN) tablet  OLANZapine zydis (ZYPREXA) 5 MG ODT  ondansetron (ZOFRAN) 8 MG  tablet  pantoprazole (PROTONIX) 40 MG EC tablet  polyethylene glycol (MIRALAX) 17 GM/Dose powder  promethazine (PHENERGAN) 12.5 MG tablet  senna-docusate (SENOKOT-S/PERICOLACE) 8.6-50 MG tablet  sodium chloride 0.65 % nasal spray  traZODone (DESYREL) 100 MG tablet  Vitamin D, Cholecalciferol, 25 MCG (1000 UT) TABS      Allergies   Allergen Reactions    Penicillins Rash and Unknown     Family History  Family History   Problem Relation Age of Onset    Diabetes Type 1 Father     Cancer Paternal Grandfather      Social History   Social History     Tobacco Use    Smoking status: Former     Current packs/day: 0.25     Average packs/day: 0.3 packs/day for 5.0 years (1.3 ttl pk-yrs)     Types: Cigarettes, Vaping Device     Passive exposure: Past    Smokeless tobacco: Never   Substance Use Topics    Alcohol use: No    Drug use: Never      A medically appropriate review of systems was performed with pertinent positives and negatives noted in the HPI, and all other systems negative.    Physical Exam      Physical Exam  Constitutional:       General: She is not in acute distress.     Appearance: Normal appearance. She is not diaphoretic.   HENT:      Head: Atraumatic.      Mouth/Throat:      Mouth: Mucous membranes are moist.   Eyes:      General: No scleral icterus.     Conjunctiva/sclera: Conjunctivae normal.   Cardiovascular:      Rate and Rhythm: Normal rate.      Heart sounds: Normal heart sounds.   Pulmonary:      Effort: No respiratory distress.      Breath sounds: Normal breath sounds.   Abdominal:      General: Abdomen is flat.   Musculoskeletal:      Cervical back: Neck supple.   Skin:     General: Skin is warm.      Findings: No rash.   Neurological:      General: No focal deficit present.      Mental Status: She is alert and oriented to person, place, and time.      Sensory: No sensory deficit.      Motor: No weakness.      Coordination: Coordination normal.   Psychiatric:         Mood and Affect: Mood is anxious.          Speech: Speech normal.         Behavior: Behavior is cooperative.           ED Course, Procedures, & Data      Procedures    Medications - No data to display  Labs Ordered and Resulted from Time of ED Arrival to Time of ED Departure - No data to display  No orders to display          Critical care was not performed.     Medical Decision Making  The patient's presentation was of moderate complexity (a chronic illness mild to moderate exacerbation, progression, or side effect of treatment).    The patient's evaluation involved:  history and exam without other MDM data elements    The patient's management necessitated moderate risk (patient with known chronic psychiatric illness at this time is now living at home with her mother she denies any acute issues but does have known recurrence of self-injurious behaviors some risk and discharge patient reassuring that she is no longer wanting to harm herself.).    Assessment & Plan        I have reviewed the nursing notes. I have reviewed the findings, diagnosis, plan and need for follow up with the patient.        Final diagnoses:   Intellectual disability   Borderline personality disorder (H)   Anxiety       Robert Olea  [facility] EMERGENCY DEPARTMENT  11/2/2024     Robert Olea MD  11/03/24 0059

## 2024-11-03 NOTE — DISCHARGE INSTRUCTIONS
Chief Complaint   Patient presents with   • Left Ankle - Follow-up           HPI the patient is here today for a follow up of her left ankle.She has recovered quite well after a very significantly displaced, trimalleolar ankle fracture that she sustained in November 2016.  Patient underwent open reduction internal fixation with stabilization of the syndesmosis at that time.  In May 2017 she had an I&D for persistent nonhealing ulcer laterally and the lateral plate and screws were removed.  She has done quite well since that time.  She is gone back to work full-time.  She does have significant amounts of pedal edema especially by the end of the day.  When she rides in a car for prolonged periods of time she does get pitting edema on the anterior aspect of the tibia.  We have discussed with her about using a Jobst compression garment stocking to minimize that symptom.  Also I have discussed with the patient that more than likely she will develop posttraumatic osteoarthritis of the ankle because of the severity of the initial index injury and injury to the articular cartilage sustained at the time of trauma.  I do think that she understands and accepts that eventuality.         There were no vitals filed for this visit.        Review of Systems   Constitutional: Negative.    HENT: Negative.    Eyes: Negative.    Respiratory: Negative.    Cardiovascular: Negative.    Gastrointestinal: Negative.    Endocrine: Negative.    Genitourinary: Negative.    Musculoskeletal: Negative.    Skin: Negative.    Allergic/Immunologic: Negative.    Neurological: Negative.    Hematological: Negative.    Psychiatric/Behavioral: Negative.            Physical Exam   Constitutional: She is oriented to person, place, and time. She appears well-nourished.   HENT:   Head: Atraumatic.   Eyes: EOM are normal.   Neck: Neck supple.   Cardiovascular: Normal rate and intact distal pulses.    Pulmonary/Chest: Breath sounds normal.   Abdominal: Bowel  Discharge to home with current outpatient plan continue with your medications and follow-up with your outpatient primary MD next week for recheck.   sounds are normal.   Musculoskeletal: She exhibits edema and tenderness.   Neurological: She is alert and oriented to person, place, and time. She has normal reflexes.   Skin: Skin is dry.   Psychiatric: She has a normal mood and affect. Her behavior is normal. Judgment and thought content normal.   Nursing note and vitals reviewed.          Joint/Body Part Specific Exam: Left ankle:  Patient is post ORIF of the ankle 11 month(s). Incisions are clean and healing nicely both medially and laterally.   Ankle mortise is stable.  Her dorsiflexion is 0-15° and this is 10° less than the opposite side.  Her plantar flexion is 0-30° which is once again 10° less than the opposite side.  She can circumduct her ankle without any significant difficulty.  Her gait is cautious but otherwise fairly normal.  There is some stiffness at the ankle consistent with the post-surgical and post-   immobilization status of the patient.   There is no clinical deformity.   Homans sign is negative.  There is no clinical evidence of a DVT.  Calf is soft and nontender.  The patient has very significant amounts of pitting edema in the anterior tibial region.  Patient is moving their toes well.   Local tenderness is consistent with a healing fracture.   Dorsalis pedis artery   pulses are palpable.   No hardware related problems are noted.    No fracture blisters are present.        X-RAY Report:  left Ankle X-Ray  Indication: Evaluation of healing of a trimalleolar ankle fracture  Views: AP, Lateral  Findings: Healed fracture both medially and laterally.  There is some early posttraumatic changes noted on the lateral aspect of the ankle with a slightly increased tilt of the talus.  yes fracture  no bony lesion  Soft tissues within normal limits  decreased joint spaces  Hardware appropriately positioned yes the medial screws are still present in the malleolus.  However the lateral plate and screws have been removed at this point.    yes prior  studies available for comparison.    X-RAY was ordered and reviewed by Db Wing MD        Diagnostics:        Dev was seen today for follow-up.    Diagnoses and all orders for this visit:    Closed fracture of left ankle with routine healing, subsequent encounter  -     XR Ankle 2 View Left          Procedures        Plan:  Weightbearing as tolerated.    Use a supportive active ankle brace to prevent the buckling and giving out of the ankle especially when she is expected to be on it for prolonged periods of time.    Use a supportive Jobst compression garment stocking to help decrease the swelling and minimize the possibility of a DVT.    Tablet ibuprofen 600 mg orally twice a day.  Pain and discomfort.    Calcium and vitamin D for bone health.    Active exercise program involving the dorsi and plantar flexors of the ankle.    Really injury precautions.    She is developing some early posttraumatic osteoarthritis and I have discussed that with the patient but at this point there is no indication for further surgical intervention.    Follow-up in my office in 6 months.

## 2024-11-03 NOTE — TELEPHONE ENCOUNTER
Triage and Transition Services- Patient Follow Up Call  Service Line Making Phone Call: Extended Care    Details of Call: LVM for patient regarding follow up, left EC number if they would like additional assistance. No further follow-up is needed.     Asia Ortiz 11/3/2024 1:44 PM

## 2024-11-03 NOTE — ED NOTES
Bed: [facility]-F  Expected date:   Expected time:   Means of arrival:   Comments:  A641  24F  Dizziness, SI  Yellow

## 2024-11-04 ENCOUNTER — PATIENT OUTREACH (OUTPATIENT)
Dept: CARE COORDINATION | Facility: CLINIC | Age: 24
End: 2024-11-04

## 2024-11-04 ENCOUNTER — TELEPHONE (OUTPATIENT)
Dept: BEHAVIORAL HEALTH | Facility: CLINIC | Age: 24
End: 2024-11-04

## 2024-11-04 ENCOUNTER — HOSPITAL ENCOUNTER (INPATIENT)
Facility: CLINIC | Age: 24
DRG: 885 | End: 2024-11-04
Attending: EMERGENCY MEDICINE | Admitting: PSYCHIATRY & NEUROLOGY
Payer: MEDICARE

## 2024-11-04 DIAGNOSIS — F41.9 ANXIETY: ICD-10-CM

## 2024-11-04 DIAGNOSIS — F60.3 EXPLOSIVE PERSONALITY DISORDER (H): Primary | ICD-10-CM

## 2024-11-04 DIAGNOSIS — R11.0 NAUSEA: ICD-10-CM

## 2024-11-04 DIAGNOSIS — R45.851 SUICIDAL IDEATION: ICD-10-CM

## 2024-11-04 DIAGNOSIS — F31.9 DEPRESSED BIPOLAR I DISORDER (H): ICD-10-CM

## 2024-11-04 DIAGNOSIS — R45.851 HAS ACCESS TO PLANNED MEANS OF SUICIDE: ICD-10-CM

## 2024-11-04 DIAGNOSIS — E66.9 OBESITY, UNSPECIFIED CLASS, UNSPECIFIED OBESITY TYPE, UNSPECIFIED WHETHER SERIOUS COMORBIDITY PRESENT: ICD-10-CM

## 2024-11-04 DIAGNOSIS — K21.00 GASTROESOPHAGEAL REFLUX DISEASE WITH ESOPHAGITIS, UNSPECIFIED WHETHER HEMORRHAGE: ICD-10-CM

## 2024-11-04 DIAGNOSIS — F90.9 ATTENTION DEFICIT HYPERACTIVITY DISORDER (ADHD), UNSPECIFIED ADHD TYPE: ICD-10-CM

## 2024-11-04 DIAGNOSIS — F60.3 BORDERLINE PERSONALITY DISORDER (H): Chronic | ICD-10-CM

## 2024-11-04 DIAGNOSIS — F31.32 BIPOLAR AFFECTIVE DISORDER, CURRENTLY DEPRESSED, MODERATE (H): ICD-10-CM

## 2024-11-04 LAB
ALBUMIN SERPL BCG-MCNC: 4.6 G/DL (ref 3.5–5.2)
ALBUMIN UR-MCNC: 30 MG/DL
ALP SERPL-CCNC: 59 U/L (ref 40–150)
ALT SERPL W P-5'-P-CCNC: 13 U/L (ref 0–50)
ANION GAP SERPL CALCULATED.3IONS-SCNC: 11 MMOL/L (ref 7–15)
APPEARANCE UR: ABNORMAL
AST SERPL W P-5'-P-CCNC: 12 U/L (ref 0–45)
BASOPHILS # BLD AUTO: 0.1 10E3/UL (ref 0–0.2)
BASOPHILS NFR BLD AUTO: 1 %
BILIRUB SERPL-MCNC: 0.2 MG/DL
BILIRUB UR QL STRIP: NEGATIVE
BUN SERPL-MCNC: 11.5 MG/DL (ref 6–20)
CALCIUM SERPL-MCNC: 9.7 MG/DL (ref 8.8–10.4)
CHLORIDE SERPL-SCNC: 107 MMOL/L (ref 98–107)
COLOR UR AUTO: YELLOW
CREAT SERPL-MCNC: 0.76 MG/DL (ref 0.51–0.95)
EGFRCR SERPLBLD CKD-EPI 2021: >90 ML/MIN/1.73M2
EOSINOPHIL # BLD AUTO: 0 10E3/UL (ref 0–0.7)
EOSINOPHIL NFR BLD AUTO: 1 %
ERYTHROCYTE [DISTWIDTH] IN BLOOD BY AUTOMATED COUNT: 13 % (ref 10–15)
GLUCOSE SERPL-MCNC: 93 MG/DL (ref 70–99)
GLUCOSE UR STRIP-MCNC: NEGATIVE MG/DL
HCG UR QL: NEGATIVE
HCO3 SERPL-SCNC: 21 MMOL/L (ref 22–29)
HCT VFR BLD AUTO: 35.9 % (ref 35–47)
HGB BLD-MCNC: 12.6 G/DL (ref 11.7–15.7)
HGB UR QL STRIP: NEGATIVE
IMM GRANULOCYTES # BLD: 0 10E3/UL
IMM GRANULOCYTES NFR BLD: 0 %
KETONES UR STRIP-MCNC: NEGATIVE MG/DL
LEUKOCYTE ESTERASE UR QL STRIP: ABNORMAL
LIPASE SERPL-CCNC: 29 U/L (ref 13–60)
LYMPHOCYTES # BLD AUTO: 2 10E3/UL (ref 0.8–5.3)
LYMPHOCYTES NFR BLD AUTO: 25 %
MCH RBC QN AUTO: 28.2 PG (ref 26.5–33)
MCHC RBC AUTO-ENTMCNC: 35.1 G/DL (ref 31.5–36.5)
MCV RBC AUTO: 80 FL (ref 78–100)
MONOCYTES # BLD AUTO: 0.4 10E3/UL (ref 0–1.3)
MONOCYTES NFR BLD AUTO: 5 %
MUCOUS THREADS #/AREA URNS LPF: PRESENT /LPF
NEUTROPHILS # BLD AUTO: 5.6 10E3/UL (ref 1.6–8.3)
NEUTROPHILS NFR BLD AUTO: 69 %
NITRATE UR QL: NEGATIVE
NRBC # BLD AUTO: 0 10E3/UL
NRBC BLD AUTO-RTO: 0 /100
PH UR STRIP: 6 [PH] (ref 5–7)
PLATELET # BLD AUTO: 238 10E3/UL (ref 150–450)
POTASSIUM SERPL-SCNC: 4 MMOL/L (ref 3.4–5.3)
PROT SERPL-MCNC: 7.6 G/DL (ref 6.4–8.3)
RBC # BLD AUTO: 4.47 10E6/UL (ref 3.8–5.2)
RBC URINE: 5 /HPF
SODIUM SERPL-SCNC: 139 MMOL/L (ref 135–145)
SP GR UR STRIP: >1.03 (ref 1–1.03)
SQUAMOUS EPITHELIAL: 32 /HPF
UROBILINOGEN UR STRIP-MCNC: 0.2 MG/DL
WBC # BLD AUTO: 8.1 10E3/UL (ref 4–11)
WBC URINE: 9 /HPF

## 2024-11-04 PROCEDURE — 36415 COLL VENOUS BLD VENIPUNCTURE: CPT | Performed by: EMERGENCY MEDICINE

## 2024-11-04 PROCEDURE — 96374 THER/PROPH/DIAG INJ IV PUSH: CPT | Performed by: EMERGENCY MEDICINE

## 2024-11-04 PROCEDURE — 99285 EMERGENCY DEPT VISIT HI MDM: CPT | Performed by: EMERGENCY MEDICINE

## 2024-11-04 PROCEDURE — 96361 HYDRATE IV INFUSION ADD-ON: CPT | Performed by: EMERGENCY MEDICINE

## 2024-11-04 PROCEDURE — 258N000003 HC RX IP 258 OP 636: Performed by: EMERGENCY MEDICINE

## 2024-11-04 PROCEDURE — 83690 ASSAY OF LIPASE: CPT | Performed by: EMERGENCY MEDICINE

## 2024-11-04 PROCEDURE — 250N000013 HC RX MED GY IP 250 OP 250 PS 637: Performed by: EMERGENCY MEDICINE

## 2024-11-04 PROCEDURE — 81003 URINALYSIS AUTO W/O SCOPE: CPT | Performed by: EMERGENCY MEDICINE

## 2024-11-04 PROCEDURE — 85018 HEMOGLOBIN: CPT | Performed by: EMERGENCY MEDICINE

## 2024-11-04 PROCEDURE — 85041 AUTOMATED RBC COUNT: CPT | Performed by: EMERGENCY MEDICINE

## 2024-11-04 PROCEDURE — 81025 URINE PREGNANCY TEST: CPT | Performed by: EMERGENCY MEDICINE

## 2024-11-04 PROCEDURE — 250N000011 HC RX IP 250 OP 636: Performed by: EMERGENCY MEDICINE

## 2024-11-04 PROCEDURE — 99207 PR NO CHARGE LOS: CPT | Performed by: CLINICAL NURSE SPECIALIST

## 2024-11-04 PROCEDURE — 99285 EMERGENCY DEPT VISIT HI MDM: CPT | Mod: 25 | Performed by: EMERGENCY MEDICINE

## 2024-11-04 PROCEDURE — 82565 ASSAY OF CREATININE: CPT | Performed by: EMERGENCY MEDICINE

## 2024-11-04 PROCEDURE — 85004 AUTOMATED DIFF WBC COUNT: CPT | Performed by: EMERGENCY MEDICINE

## 2024-11-04 PROCEDURE — 84155 ASSAY OF PROTEIN SERUM: CPT | Performed by: EMERGENCY MEDICINE

## 2024-11-04 RX ORDER — ONDANSETRON 2 MG/ML
4 INJECTION INTRAMUSCULAR; INTRAVENOUS EVERY 30 MIN PRN
Status: DISCONTINUED | OUTPATIENT
Start: 2024-11-04 | End: 2024-11-14

## 2024-11-04 RX ORDER — OLANZAPINE 10 MG/1
10 TABLET, ORALLY DISINTEGRATING ORAL ONCE
Status: COMPLETED | OUTPATIENT
Start: 2024-11-04 | End: 2024-11-04

## 2024-11-04 RX ADMIN — SODIUM CHLORIDE 1000 ML: 9 INJECTION, SOLUTION INTRAVENOUS at 13:08

## 2024-11-04 RX ADMIN — ONDANSETRON 4 MG: 2 INJECTION INTRAMUSCULAR; INTRAVENOUS at 13:07

## 2024-11-04 RX ADMIN — OLANZAPINE 10 MG: 10 TABLET, ORALLY DISINTEGRATING ORAL at 14:54

## 2024-11-04 ASSESSMENT — ACTIVITIES OF DAILY LIVING (ADL)
ADLS_ACUITY_SCORE: 0

## 2024-11-04 ASSESSMENT — COLUMBIA-SUICIDE SEVERITY RATING SCALE - C-SSRS
6. HAVE YOU EVER DONE ANYTHING, STARTED TO DO ANYTHING, OR PREPARED TO DO ANYTHING TO END YOUR LIFE?: YES
3. HAVE YOU BEEN THINKING ABOUT HOW YOU MIGHT KILL YOURSELF?: YES
4. HAVE YOU HAD THESE THOUGHTS AND HAD SOME INTENTION OF ACTING ON THEM?: NO
5. HAVE YOU STARTED TO WORK OUT OR WORKED OUT THE DETAILS OF HOW TO KILL YOURSELF? DO YOU INTEND TO CARRY OUT THIS PLAN?: YES
2. HAVE YOU ACTUALLY HAD ANY THOUGHTS OF KILLING YOURSELF IN THE PAST MONTH?: YES
1. IN THE PAST MONTH, HAVE YOU WISHED YOU WERE DEAD OR WISHED YOU COULD GO TO SLEEP AND NOT WAKE UP?: YES

## 2024-11-04 NOTE — ED PROVIDER NOTES
ED Provider Note  [facility]      History     Chief Complaint   Patient presents with    Suicidal     Had a pocket knife and stated she was going slit her wrists.      HPI  Sadaf Ross is a 24 year old female with a history of bipolar disorder, borderline personality disorder, ADHD, and frequent ED presentations with care plan in place who presents to the emergency department via EMS for suicidal ideation with plan to slit wrists with pocket knife.    Patient states she was picked up this morning by her positive .  Patient states she was feeling okay when she left home this morning. Patient developed unsteadiness, nausea, vomiting, diarrhea, and belly pain.  Patient has not slept last 24 hours.  Patient wants to kill herself by any means necessary, wants to cut herself.  Patient is scared she is going to die.     Past Medical History  Past Medical History:   Diagnosis Date    ADHD (attention deficit hyperactivity disorder)     Bipolar 1 disorder (H)     Borderline personality disorder (H)     Depressive disorder     Intellectual disability     Obesity     Syncope      Past Surgical History:   Procedure Laterality Date    APPENDECTOMY       acetaminophen (TYLENOL) 325 MG tablet  albuterol (PROAIR HFA/PROVENTIL HFA/VENTOLIN HFA) 108 (90 Base) MCG/ACT inhaler  ARIPiprazole lauroxil ER (ARISTADA) 882 MG/3.2ML intra-muscular  benztropine (COGENTIN) 1 MG tablet  cloNIDine (CATAPRES) 0.1 MG tablet  clotrimazole (LOTRIMIN) 1 % external cream  dicyclomine (BENTYL) 20 MG tablet  divalproex sodium extended-release (DEPAKOTE ER) 250 MG 24 hr tablet  docusate sodium (COLACE) 50 MG capsule  famotidine (PEPCID) 20 MG tablet  FLUoxetine (PROZAC) 40 MG capsule  hydrOXYzine (ATARAX) 50 MG tablet  lactase (LACTAID) 3000 UNIT tablet  levothyroxine (SYNTHROID/LEVOTHROID) 25 MCG tablet  multivitamin w/minerals (MULTI-VITAMIN) tablet  OLANZapine zydis (ZYPREXA) 5 MG ODT  ondansetron  (ZOFRAN) 8 MG tablet  pantoprazole (PROTONIX) 40 MG EC tablet  polyethylene glycol (MIRALAX) 17 GM/Dose powder  promethazine (PHENERGAN) 12.5 MG tablet  senna-docusate (SENOKOT-S/PERICOLACE) 8.6-50 MG tablet  sodium chloride 0.65 % nasal spray  traZODone (DESYREL) 100 MG tablet  Vitamin D, Cholecalciferol, 25 MCG (1000 UT) TABS      Allergies   Allergen Reactions    Penicillins Rash and Unknown     Family History  Family History   Problem Relation Age of Onset    Diabetes Type 1 Father     Cancer Paternal Grandfather      Social History   Social History     Tobacco Use    Smoking status: Former     Current packs/day: 0.25     Average packs/day: 0.3 packs/day for 5.0 years (1.3 ttl pk-yrs)     Types: Cigarettes, Vaping Device     Passive exposure: Past    Smokeless tobacco: Never   Substance Use Topics    Alcohol use: No    Drug use: Never      Past medical history, past surgical history, medications, allergies, family history, and social history were reviewed with the patient. No additional pertinent items.   A medically appropriate review of systems was performed with pertinent positives and negatives noted in the HPI, and all other systems negative.    Physical Exam   BP: 131/78  Pulse: 111  Temp: 97.6  F (36.4  C)  Resp: 16  SpO2: 99 %  Physical Exam  Vitals and nursing note reviewed.   Constitutional:       General: She is not in acute distress.     Appearance: Normal appearance. She is obese. She is not diaphoretic.   HENT:      Head: Atraumatic.      Mouth/Throat:      Mouth: Mucous membranes are moist.   Eyes:      General: No scleral icterus.     Conjunctiva/sclera: Conjunctivae normal.   Cardiovascular:      Rate and Rhythm: Normal rate.      Heart sounds: Normal heart sounds.   Pulmonary:      Effort: No respiratory distress.      Breath sounds: Normal breath sounds.   Abdominal:      General: Abdomen is flat.      Tenderness: There is no abdominal tenderness.   Musculoskeletal:         General: Normal  range of motion.      Cervical back: Neck supple.   Skin:     General: Skin is warm.      Findings: No erythema or rash.   Neurological:      General: No focal deficit present.      Mental Status: She is alert.      Motor: No weakness.   Psychiatric:         Mood and Affect: Mood is anxious and depressed. Affect is tearful.         Speech: Speech normal.         Behavior: Behavior is hyperactive. Behavior is cooperative.         Thought Content: Thought content includes suicidal ideation. Thought content does not include homicidal ideation. Thought content includes suicidal plan. Thought content does not include homicidal plan.         ED Course, Procedures, & Data      Procedures       A consult was attained from the DEC service. The case was discussed with  from that service.     Results for orders placed or performed during the hospital encounter of 11/04/24   Comprehensive metabolic panel     Status: Abnormal   Result Value Ref Range    Sodium 139 135 - 145 mmol/L    Potassium 4.0 3.4 - 5.3 mmol/L    Carbon Dioxide (CO2) 21 (L) 22 - 29 mmol/L    Anion Gap 11 7 - 15 mmol/L    Urea Nitrogen 11.5 6.0 - 20.0 mg/dL    Creatinine 0.76 0.51 - 0.95 mg/dL    GFR Estimate >90 >60 mL/min/1.73m2    Calcium 9.7 8.8 - 10.4 mg/dL    Chloride 107 98 - 107 mmol/L    Glucose 93 70 - 99 mg/dL    Alkaline Phosphatase 59 40 - 150 U/L    AST 12 0 - 45 U/L    ALT 13 0 - 50 U/L    Protein Total 7.6 6.4 - 8.3 g/dL    Albumin 4.6 3.5 - 5.2 g/dL    Bilirubin Total 0.2 <=1.2 mg/dL   Lipase     Status: Normal   Result Value Ref Range    Lipase 29 13 - 60 U/L   UA with Microscopic reflex to Culture     Status: Abnormal    Specimen: Urine, Clean Catch   Result Value Ref Range    Color Urine Yellow Colorless, Straw, Light Yellow, Yellow    Appearance Urine Slightly Cloudy (A) Clear    Glucose Urine Negative Negative mg/dL    Bilirubin Urine Negative Negative    Ketones Urine Negative Negative mg/dL    Specific Gravity Urine >1.030  1.003 - 1.035    Blood Urine Negative Negative    pH Urine 6.0 5.0 - 7.0    Protein Albumin Urine 30 (A) Negative mg/dL    Urobilinogen Urine 0.2 0.2, 1.0 mg/dL    Nitrite Urine Negative Negative    Leukocyte Esterase Urine Trace (A) Negative    Mucus Urine Present (A) None Seen /LPF    RBC Urine 5 (H) <=2 /HPF    WBC Urine 9 (H) <=5 /HPF    Squamous Epithelials Urine 32 (H) <=1 /HPF    Narrative    Urine Culture not indicated   HCG qualitative urine     Status: Normal   Result Value Ref Range    hCG Urine Qualitative Negative Negative   CBC with platelets and differential     Status: None   Result Value Ref Range    WBC Count 8.1 4.0 - 11.0 10e3/uL    RBC Count 4.47 3.80 - 5.20 10e6/uL    Hemoglobin 12.6 11.7 - 15.7 g/dL    Hematocrit 35.9 35.0 - 47.0 %    MCV 80 78 - 100 fL    MCH 28.2 26.5 - 33.0 pg    MCHC 35.1 31.5 - 36.5 g/dL    RDW 13.0 10.0 - 15.0 %    Platelet Count 238 150 - 450 10e3/uL    % Neutrophils 69 %    % Lymphocytes 25 %    % Monocytes 5 %    % Eosinophils 1 %    % Basophils 1 %    % Immature Granulocytes 0 %    NRBCs per 100 WBC 0 <1 /100    Absolute Neutrophils 5.6 1.6 - 8.3 10e3/uL    Absolute Lymphocytes 2.0 0.8 - 5.3 10e3/uL    Absolute Monocytes 0.4 0.0 - 1.3 10e3/uL    Absolute Eosinophils 0.0 0.0 - 0.7 10e3/uL    Absolute Basophils 0.1 0.0 - 0.2 10e3/uL    Absolute Immature Granulocytes 0.0 <=0.4 10e3/uL    Absolute NRBCs 0.0 10e3/uL   CBC with platelets differential     Status: None    Narrative    The following orders were created for panel order CBC with platelets differential.  Procedure                               Abnormality         Status                     ---------                               -----------         ------                     CBC with platelets and d...[[id number]]                      Final result                 Please view results for these tests on the individual orders.     Medications   ondansetron (ZOFRAN) injection 4 mg (4 mg Intravenous $Given 11/4/24  1307)   sodium chloride 0.9% BOLUS 1,000 mL (0 mLs Intravenous Stopped 11/4/24 1344)   OLANZapine zydis (zyPREXA) ODT tab 10 mg (10 mg Oral $Given 11/4/24 1454)     Labs Ordered and Resulted from Time of ED Arrival to Time of ED Departure   COMPREHENSIVE METABOLIC PANEL - Abnormal       Result Value    Sodium 139      Potassium 4.0      Carbon Dioxide (CO2) 21 (*)     Anion Gap 11      Urea Nitrogen 11.5      Creatinine 0.76      GFR Estimate >90      Calcium 9.7      Chloride 107      Glucose 93      Alkaline Phosphatase 59      AST 12      ALT 13      Protein Total 7.6      Albumin 4.6      Bilirubin Total 0.2     ROUTINE UA WITH MICROSCOPIC REFLEX TO CULTURE - Abnormal    Color Urine Yellow      Appearance Urine Slightly Cloudy (*)     Glucose Urine Negative      Bilirubin Urine Negative      Ketones Urine Negative      Specific Gravity Urine >1.030      Blood Urine Negative      pH Urine 6.0      Protein Albumin Urine 30 (*)     Urobilinogen Urine 0.2      Nitrite Urine Negative      Leukocyte Esterase Urine Trace (*)     Mucus Urine Present (*)     RBC Urine 5 (*)     WBC Urine 9 (*)     Squamous Epithelials Urine 32 (*)    LIPASE - Normal    Lipase 29     HCG QUALITATIVE URINE - Normal    hCG Urine Qualitative Negative     CBC WITH PLATELETS AND DIFFERENTIAL    WBC Count 8.1      RBC Count 4.47      Hemoglobin 12.6      Hematocrit 35.9      MCV 80      MCH 28.2      MCHC 35.1      RDW 13.0      Platelet Count 238      % Neutrophils 69      % Lymphocytes 25      % Monocytes 5      % Eosinophils 1      % Basophils 1      % Immature Granulocytes 0      NRBCs per 100 WBC 0      Absolute Neutrophils 5.6      Absolute Lymphocytes 2.0      Absolute Monocytes 0.4      Absolute Eosinophils 0.0      Absolute Basophils 0.1      Absolute Immature Granulocytes 0.0      Absolute NRBCs 0.0       No orders to display          Critical care was not performed.     Medical Decision Making  The patient's presentation was of high  complexity (an acute health issue posing potential threat to life or bodily function).    The patient's evaluation involved:  ordering and/or review of 3+ test(s) in this encounter (see separate area of note for details)  discussion of management or test interpretation with another health professional (see separate area of note for details)    The patient's management necessitated high risk (a decision regarding hospitalization).    Assessment & Plan    25 yo female here with suicidal ideation with a plan to cut her wrists. EMS found a pocket knife on her person. She has attempted suicide in the past. She is no longer living in a group home and living with her mom, but her mom says her acting out has gotten worse and mom does not feel safe with the patient continuing to live with her due to another small child in the house.    The case was discussed with the DEC  who evaluated the pt and agree she is in a different environment than normal and now has access to knives and her own meds which was her plan to overdose when she was here three days ago. She was placed on a hold for risk  of harm to self or others and will be admitted to psychiatry for further evaluation and treatment.    I have reviewed the nursing notes. I have reviewed the findings, diagnosis, plan and need for follow up with the patient.    New Prescriptions    No medications on file       Final diagnoses:   Suicidal ideation   Has access to planned means of suicide   I, Michael Mcgee, am serving as a trained medical scribe to document services personally performed by Ramo Pacheco MD, based to the provider's statements to me.     IRamo MD, was physically present and have reviewed and verified the accuracy of this note documented by Michael Mcgee.     Ramo Pacheco MD  [facility] EMERGENCY DEPARTMENT  11/4/2024     Ramo Pacheco MD  11/04/24 1956

## 2024-11-04 NOTE — ED NOTES
IP MH Referral Acuity Rating Score (RARS)    LMHP complete at referral to IP MH, with DEC; and, daily while awaiting IP MH placement. Call score to PPS.  CRITERIA SCORING   New 72 HH and Involuntary for IP MH (not adolescent) 1/1   Boarding over 24 hours 0/1   Vulnerable adult at least 55+ with multiple co morbidities; or, Patient age 11 or under 0/1   Suicide ideation without relief of precipitating factors 1/1   Current plan for suicide 1/1   Current plan for homicide 0/1   Imminent risk or actual attempt to seriously harm another without relief of factors precipitating the attempt 0/1   Severe dysfunction in daily living (ex: complete neglect for self care, extreme disruption in vegetative function, extreme deterioration in social interactions) 1/1   Recent (last 2 weeks) or current physical aggression in the ED 0/1   Restraints or seclusion episode in ED 0/1   Verbal aggression, agitation, yelling, etc., while in the ED 1/1   Active psychosis with psychomotor agitation or catatonia 0/1   Need for constant or near constant redirection (from leaving, from others, etc).  1/1   Intrusive or disruptive behaviors 1/1   TOTAL Acuity Total Score: 7

## 2024-11-04 NOTE — PLAN OF CARE
"Sadaf Ross  November 4, 2024  Plan of Care Hand-off Note     Patient Care Path: inpatient mental health     Plan for Care:   Sadaf presented to the ED with suicidal ideation with a plan and means. She was threatening to kill herself and had a safety knife on her. In DEC assessment with the pt, she reported multiple times, \"I've I leave her today, I'm going to kill myself.\" Assessment ended early, because pt continued to threatened to pull out her IV and writer let pt know that if she continued to do so we would end the assessment and I'd grab the nurse. Which is what occurred and writer let nurse know what pt was threatening and a nurse removed pts IV. Writer then reached out to pt's mother who reported that the pocket knife pt had hidden on her was very well hidden and that pt must have been searching through things while people were sleeping. She reports pts behaviors have been more erratic and she has been to the ED three times in the last few days. She no longer feels like she can keep pt safe at home and does not feel safe with pt at home due to her erratic behaviors and constantly suicidal behaviors, due to their being a small child at home. Pt reports that her current symptoms and dysregulation are due to not sleeping, she feels like a lot of her meds have stopped working. Many of her psych medications have been changed or reduced recently.     Writer attempted to reach pts  to inform her of the situation of Mom not feeling she pt could come back to the house at any time to live. Writer was unable to reach  or  supervisor, left message for both with Extended Care number.     Due to the severity of pt's current threats and the escalation of her behaviors, pt is recommended for inpatient. Pt does have an ongoing care plan; however, the prior recommendations were to discharge pt back to group home where she has 24/7 care, trained staff, someone passing out medication, " and more. It is recommended to work with  to create an appropriate discharge plan from inpatient that may include a crisis residence or IRTS facility as they search for longer term supportive living environment for pt. It is also recommended to find a third party legal guardian due to pt's ongoing mental health and intellectual disabilities. Pt's mother reported to writer that she does not feel like she has the capacity to be her guardian for a variety of reasons. However, she is still willing to be supportive in talking with pt and in a lesser capacity.     Extended care will continue to follow tomorrow.     , [facility]: [phone]  Supervisor, Charlotte Nasim Barakat: [phone]    Identified Goals and Safety Issues: Be safe with self and staff.        Legal Status:  Involuntary, 72HH  72HH  Start: Mon 11/4/24 at 2:46pm  End: Thurs 11/7/24 at 2:46pm    Psychiatry Consult: Placed     Updated   regarding plan of care.      CHERI Nugent

## 2024-11-04 NOTE — ED TRIAGE NOTES
Pt was brought In Via EMS. EMS reports the patient had a pocket knife that they took away. The Pt had stated they wanted to use the Pocket knife to Slit her wrists. Pt also reports that they do  ot like their group home and wants to leave.

## 2024-11-04 NOTE — TELEPHONE ENCOUNTER
S: [facility] , DEC  Veronique  calling at 2:56 PM about a 24 year old/Female presenting with increased SI w/ intent and plan- o.d. on medications or cut self, along with SIB via biting self recently.      B: Pt arrived via EMS. Presenting problem, stressors: feels like medications are not working for them, and a lot of people residing with her as she now lives at home with mom and does not receive the 24 hour care she received at their previous group home.     Pt affect in ED:  dysregulated, agitated, bawling dramatic  Pt Dx: Bipolar Disorder, Borderline Personality Disorder, ADHD, and Intellectual Disability  Previous IPMH hx? Yes: 11/25/2021 on [facility]  Pt endorses SI with a plan to o.d. on medications or cut self    Hx of suicide attempt? Yes: cutting self and taking a bunch of pills.   Pt endorses SIB via biting self, most recent episode recently  Pt denies HI   Pt denies hallucinations .   Pt RARS Score: 7    Hx of aggression/violence, sexual offenses, legal concerns, Epic care plan? describe: Yes, pt has an ED CARE PLAN  Current concerns for aggression this visit? No, but is loud, disruptive behaviors, threatened to pull out IV. PT HAS AN ED CARE PLAN.   Does pt have a history of Civil Commitment? No  Is Pt their own guardian? Yes    Pt is prescribed medication. Is patient medication compliant? Yes  Pt endorses OP services: Psychiatrist and   CD concerns: None  Acute or chronic medical concerns: No  Does Pt present with specific needs, assistive devices, or exclusionary criteria? None      Pt is ambulatory  Pt is able to perform ADLs independently      A: Pt to be reviewed for [facility] admission. Pt is on a 72HH, initiated 11/04/24 02:47 PM  Preferred placement: Statewide    COVID Symptoms: No  If yes, COVID test required   Utox: Not ordered, intake to request lab    CMP: Abnormalities: CO2 21  CBC: WNL  HCG: Negative    R: Patient cleared and ready for behavioral bed placement: Yes  Pt placed on  IP worklist? Yes    Does Patient need a Transfer Center request created? No, Pt is located within [facility] ED, [facility] ED, or [facility]    no

## 2024-11-04 NOTE — PROGRESS NOTES
[facility]    Background: Transitional Care Management program identified per system criteria and reviewed by [facility] Resource Center team for possible outreach.    Assessment: Upon chart review, [facility] Team member will not proceed with patient outreach related to this episode of Transitional Care Management program due to reason below:    Patient has presented to Emergency Department, been readmitted to hospital, or transferred to another hospital.    Plan: Transitional Care Management episode addressed appropriately per reason noted above.      GREG Parks  [facility], [facility]    *Connected Care Resource Team does NOT follow patient ongoing. Referrals are identified based on internal discharge reports and the outreach is to ensure patient has an understanding of their discharge instructions.

## 2024-11-04 NOTE — ED NOTES
"Pt was informed she was being placed on a 72 hour hold and being admitted. Pt became upset got off the bed and began walking toward the exit stating \"I am leaving.\" Writer followed and explained that we need her to stay and cannot allow her to stay. Pt stopped in the hallway and began crying loudly. Pt willingly walked back to her bed. Pt took 10mg of Zyprexa. Pt asked if she has to give up her phone when up on the mental health unit, writer responded \"Yes, when you're on the unit, but not here in the ED.\" Pt handed her phone to writer and stated \"Just take it now.\" Pt also asked if she needs to be in scrubs on the unit, and asked for scrubs immediately.   " Refill is sent to the pharm v/oGAJ

## 2024-11-04 NOTE — ED NOTES
Bed: [facility]-D  Expected date: 11/4/24  Expected time: 11:28 AM  Means of arrival: Ambulance  Comments:  Isai Gonzalez 24 yr F SI

## 2024-11-05 ENCOUNTER — TELEPHONE (OUTPATIENT)
Dept: BEHAVIORAL HEALTH | Facility: CLINIC | Age: 24
End: 2024-11-05
Payer: MEDICARE

## 2024-11-05 PROCEDURE — 250N000013 HC RX MED GY IP 250 OP 250 PS 637: Performed by: EMERGENCY MEDICINE

## 2024-11-05 PROCEDURE — 99223 1ST HOSP IP/OBS HIGH 75: CPT | Performed by: CLINICAL NURSE SPECIALIST

## 2024-11-05 PROCEDURE — 250N000013 HC RX MED GY IP 250 OP 250 PS 637: Performed by: STUDENT IN AN ORGANIZED HEALTH CARE EDUCATION/TRAINING PROGRAM

## 2024-11-05 RX ORDER — DOCUSATE SODIUM 100 MG/1
100 CAPSULE, LIQUID FILLED ORAL 2 TIMES DAILY
Status: DISCONTINUED | OUTPATIENT
Start: 2024-11-05 | End: 2024-11-22 | Stop reason: HOSPADM

## 2024-11-05 RX ORDER — TRAZODONE HYDROCHLORIDE 100 MG/1
100 TABLET ORAL AT BEDTIME
Status: DISCONTINUED | OUTPATIENT
Start: 2024-11-05 | End: 2024-11-22 | Stop reason: HOSPADM

## 2024-11-05 RX ORDER — FAMOTIDINE 20 MG/1
20 TABLET, FILM COATED ORAL AT BEDTIME
Status: DISCONTINUED | OUTPATIENT
Start: 2024-11-06 | End: 2024-11-14

## 2024-11-05 RX ORDER — CETIRIZINE HYDROCHLORIDE 10 MG/1
10 TABLET ORAL DAILY
Status: DISCONTINUED | OUTPATIENT
Start: 2024-11-06 | End: 2024-11-14

## 2024-11-05 RX ORDER — PANTOPRAZOLE SODIUM 20 MG/1
40 TABLET, DELAYED RELEASE ORAL DAILY
Status: DISCONTINUED | OUTPATIENT
Start: 2024-11-06 | End: 2024-11-22 | Stop reason: HOSPADM

## 2024-11-05 RX ORDER — BENZTROPINE MESYLATE 1 MG/1
1 TABLET ORAL 2 TIMES DAILY PRN
Status: DISCONTINUED | OUTPATIENT
Start: 2024-11-05 | End: 2024-11-22 | Stop reason: HOSPADM

## 2024-11-05 RX ORDER — CLONIDINE HYDROCHLORIDE 0.1 MG/1
0.1 TABLET ORAL AT BEDTIME
Status: DISCONTINUED | OUTPATIENT
Start: 2024-11-05 | End: 2024-11-22 | Stop reason: HOSPADM

## 2024-11-05 RX ORDER — FLUOXETINE 40 MG/1
80 CAPSULE ORAL DAILY
Status: DISCONTINUED | OUTPATIENT
Start: 2024-11-06 | End: 2024-11-11

## 2024-11-05 RX ORDER — ACETAMINOPHEN 500 MG
1000 TABLET ORAL EVERY 6 HOURS PRN
Status: DISCONTINUED | OUTPATIENT
Start: 2024-11-05 | End: 2024-11-05

## 2024-11-05 RX ORDER — MULTIPLE VITAMINS W/ MINERALS TAB 9MG-400MCG
1 TAB ORAL DAILY
Status: DISCONTINUED | OUTPATIENT
Start: 2024-11-06 | End: 2024-11-22 | Stop reason: HOSPADM

## 2024-11-05 RX ORDER — CLONIDINE HYDROCHLORIDE 0.1 MG/1
0.1 TABLET ORAL DAILY
Status: DISCONTINUED | OUTPATIENT
Start: 2024-11-06 | End: 2024-11-05

## 2024-11-05 RX ORDER — FAMOTIDINE 20 MG/1
20 TABLET, FILM COATED ORAL 2 TIMES DAILY
Status: DISCONTINUED | OUTPATIENT
Start: 2024-11-05 | End: 2024-11-05

## 2024-11-05 RX ORDER — BENZTROPINE MESYLATE 1 MG/1
1 TABLET ORAL 2 TIMES DAILY
Status: DISCONTINUED | OUTPATIENT
Start: 2024-11-05 | End: 2024-11-05

## 2024-11-05 RX ORDER — POLYETHYLENE GLYCOL 3350 17 G/17G
17 POWDER, FOR SOLUTION ORAL DAILY PRN
Status: DISCONTINUED | OUTPATIENT
Start: 2024-11-05 | End: 2024-11-12

## 2024-11-05 RX ORDER — ACETAMINOPHEN 325 MG/1
650 TABLET ORAL EVERY 6 HOURS PRN
Status: DISCONTINUED | OUTPATIENT
Start: 2024-11-05 | End: 2024-11-22 | Stop reason: HOSPADM

## 2024-11-05 RX ORDER — DIVALPROEX SODIUM 250 MG/1
250 TABLET, FILM COATED, EXTENDED RELEASE ORAL 2 TIMES DAILY
Status: COMPLETED | OUTPATIENT
Start: 2024-11-05 | End: 2024-11-15

## 2024-11-05 RX ORDER — HYDROXYZINE HYDROCHLORIDE 50 MG/1
50 TABLET, FILM COATED ORAL 2 TIMES DAILY PRN
Status: DISCONTINUED | OUTPATIENT
Start: 2024-11-05 | End: 2024-11-08

## 2024-11-05 RX ORDER — CETIRIZINE HYDROCHLORIDE 10 MG/1
10 TABLET ORAL DAILY
COMMUNITY
Start: 2024-09-27

## 2024-11-05 RX ORDER — CHOLECALCIFEROL (VITAMIN D3) 125 MCG
3000 CAPSULE ORAL 3 TIMES DAILY PRN
Status: DISCONTINUED | OUTPATIENT
Start: 2024-11-05 | End: 2024-11-22 | Stop reason: HOSPADM

## 2024-11-05 RX ORDER — DICYCLOMINE HCL 20 MG
20 TABLET ORAL 2 TIMES DAILY
Status: DISCONTINUED | OUTPATIENT
Start: 2024-11-05 | End: 2024-11-22 | Stop reason: HOSPADM

## 2024-11-05 RX ORDER — ONDANSETRON 4 MG/1
8 TABLET, FILM COATED ORAL EVERY 8 HOURS PRN
Status: DISCONTINUED | OUTPATIENT
Start: 2024-11-05 | End: 2024-11-22 | Stop reason: HOSPADM

## 2024-11-05 RX ORDER — VITAMIN B COMPLEX
25 TABLET ORAL DAILY
Status: DISCONTINUED | OUTPATIENT
Start: 2024-11-06 | End: 2024-11-22 | Stop reason: HOSPADM

## 2024-11-05 RX ORDER — LEVOTHYROXINE SODIUM 25 UG/1
25 TABLET ORAL DAILY
Status: DISCONTINUED | OUTPATIENT
Start: 2024-11-06 | End: 2024-11-22 | Stop reason: HOSPADM

## 2024-11-05 RX ORDER — ALBUTEROL SULFATE 90 UG/1
1 INHALANT RESPIRATORY (INHALATION) EVERY 6 HOURS PRN
Status: DISCONTINUED | OUTPATIENT
Start: 2024-11-05 | End: 2024-11-22 | Stop reason: HOSPADM

## 2024-11-05 RX ADMIN — DOCUSATE SODIUM 100 MG: 100 CAPSULE, LIQUID FILLED ORAL at 21:03

## 2024-11-05 RX ADMIN — ACETAMINOPHEN 1000 MG: 500 TABLET ORAL at 00:50

## 2024-11-05 RX ADMIN — FAMOTIDINE 20 MG: 20 TABLET ORAL at 19:08

## 2024-11-05 RX ADMIN — HYDROXYZINE HYDROCHLORIDE 50 MG: 50 TABLET, FILM COATED ORAL at 21:04

## 2024-11-05 RX ADMIN — FAMOTIDINE 20 MG: 20 TABLET ORAL at 00:55

## 2024-11-05 RX ADMIN — ACETAMINOPHEN 1000 MG: 500 TABLET ORAL at 19:08

## 2024-11-05 RX ADMIN — DICYCLOMINE HYDROCHLORIDE 20 MG: 20 TABLET ORAL at 21:04

## 2024-11-05 RX ADMIN — DIVALPROEX SODIUM 250 MG: 250 TABLET, FILM COATED, EXTENDED RELEASE ORAL at 21:03

## 2024-11-05 RX ADMIN — FAMOTIDINE 20 MG: 20 TABLET ORAL at 10:44

## 2024-11-05 NOTE — PROGRESS NOTES
11/4/2024  Sadaf Ross 2000     Writer contacted to ED and spoke with patient's nurse provider, Rere Lincoln, at 10:55am regarding extended care follow up session. Nursing provider indicated the consult room was not available and Pt declined to be seen in the hallway. Nursing provider indicated they would reach out at a later time when the consult room becomes available.    Enmanuel cMmahon, [facility]

## 2024-11-05 NOTE — ED PROVIDER NOTES
Emergency Department I-PASS Sign-out      Illness Severity: Stable    Patient Summary:  24 year old female with pertinent PMH of depression with suicidal thoughts who presented with worsening depression with suicidal thoughts and a plan to overdose on pills; she has not taken her prescriptions meds in the last 2-3 days just one week after discharge from here on a two week stay    ED Course/treatment plan: DEC assessment    Clinical Impression:  (R45.851) Suicidal ideation    (R45.851) Has access to planned means of suicide      Edited by: Ramo Pacheco MD at 2024    Action List:  Tests to Follow-up:  None    Medications Reconciled/Ordered:  Yes    ED Mental Health Boarding Order Set Used for Diet/PRNs/Other:  Yes    DEC, Extended Care, Psych Consult Orders:  DEC assessment completed.     Situational Awareness & Contingency Plannin Hour Hold Status:  Voluntary, would reassess if wanting to leave.  Active Orders  Legal  Emergency Hospitalization Hold (72 Hr Hold)  Frequency: Effective Now  Start Date/Time: 24 1446   Number of Occurrences: Until Specified    Disposition:  Admit/Transfer to Behavioral Health    [facility]    Edited by: Ramo Pacheco MD at 2024    Synthesis & Events after sign-out:  Patient resting currently.  Cooperative.  On 72h hold with admit to  for suicidal heightened risk.        Rakesh Estrella MD   Emergency Medicine     Delores, Rakesh Zhang MD  24 [id number]

## 2024-11-05 NOTE — PROGRESS NOTES
Attempted to see patient for the psychiatric consult but there was no available consultation room and her bed was in the hallway. Will defer evaluation until there is an available space where she can have privacy.    LINN Pike, CNP  Emergency Psychiatry  11/04/2024
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
## 2024-11-05 NOTE — TELEPHONE ENCOUNTER
R: [facility] Access Inpatient Bed Call Log  11/5/24 @ 1:00am   Intake has called facilities that have not updated their bed status within the last 12 hours.     *METRO:  [place] -- [facility]: @ capacity.  [facility]: @ cap per website. Reporting no reviews overnight.  [place] -- [facility]: @ cap per website. Low acuity  [place] -- [facility]: @ cap per website. Low acuity only.  [place] -- [facility]: @ cap per website.  [facility]: @ cap per website.  [facility] beds: @ cap per website. Ages 18-35, Voluntary only, NO aggression/physical/sexual assault, violence hx or drug abuse, or psychosis. Negative Covid  [place] -- [facility]: @ cap per website.  [place] -- [facility]: @ cap per website.  [place] -- [facility]: @ cap per website. Do not review overnight.     *STATEWIDE (by distance):  [facility]: POSTING 2 BEDS. Mixed unit. Ages 12 and up/Low acuity only. - Not appropriate due to pt acuity.   [facility] - POSTING 1 BED. Low acuity, No aggression.   [facility] - @ cap per website.  [facility] - @ cap per website. Low acuity only. No current aggression.  [facility] - @ cap per website. Negative Covid. Lower acuity only.  [facility] - @ cap per website. Low acuity only. Prefer med-adjustment placements.  [place] [facility] - @ cap per website. No aggression. - Only Low Acuity reviews.  [place] - [facility]: @ cap per website. No aggressive behaviors. Do not review overnight.  [place] [facility]: POSTING 1 BED.  No hx of aggression. No sexual offenders. Voluntary patients only. - Not appropriate due to pt acuity.   [place] -- [facility]: POSTING 1 BED. Low acuity only. Must be able to do programming. No aggression/violent behavior in 2 years. No CD treatment.  [place] -- [facility]: @ cap per website. Negative Covid test. Must be  low acuity ONLY.  [place] -- [facility]: POSTING 2 BEDS. Low acuity. Negative Covid.   [place] -- [facility]: POSTING 5 BEDS. - REPORT 2 ICU & 4 LOW ACUITY BEDS.  [place] - [facility]: POSTING 2 BEDS. No hx of aggression/assault. No lines, drains or tubes. Does not provide detox or CD treatment. Require a confirmed ride upon discharge.   [place] -- [facility]: POSTING 3 BEDS. Negative COVID. No medical devices.       Pt remains on waitlist pending appropriate placement availability.    3:05 AM Intake called Isai. Per Juan, they are capped.

## 2024-11-05 NOTE — TELEPHONE ENCOUNTER
R:  [place]  Access Inpatient Bed Call Log 11/4/2024 @ 3:10 PM:    Intake has called facilities that have not updated the bed status within the last 12 hours.             (Adults);                               [facility] is posting 0 beds.                           [facility] is posting 0 beds. [phone]: per call at 7:11 am to Panchito, they are at cap.              [facility] is posting 0 beds. Negative covid required.                 [facility] is posting 0 beds. Neg covid. No high school/Krystal-psych. [phone]; per call at 7:13 am to Grabill, they are at cap but we can call again around 3 pm to check again.              [facility] is posting 0 beds. [phone]                 [facility] is posting 0 beds. [phone]                  [facility] is posting 4 Young Adult beds. (Ages 18-35) Negative covid. [phone]; per call at 7:14 am to Lauren, they have 3 y/a, no child, 1 adol, and no josie beds avail.              [facility] is posting 0 beds.                  [facility] is posting 0 beds [phone]                         [facility] is posting 1 bed. LOW acuity ONLY. Mixed unit 12+. Negative covid- [phone]; per call at 7:17 am to Fred, they are at cap.                  [facility] has 0 bed posted. No aggression. Negative Covid. Low acuity.                 [facility] is posting 1 beds. Low acuity only. Neg covid.  [phone];             [facility] is posting 0 bed. Low acuity. No current aggression.                   [facility] is posting 0 beds. Negative covid. [phone], ext [phone]. Per call at 7:21 am, recording says their adult and adol units are at cap.              [facility] is posting 0 beds available. Negative covid.  [phone].              [facility] is posting 1 bed. Low acuity. 72 HH hold preferred. Negative covid required. [phone]. Per call  at 7:24 am to Aruna, they have 1 bed avail.              [facility] is posting 0 beds. Low acuity only. Neg covid.  [phone].                       [facility] is posting 2 beds.  Negative covid required.   Vol only, No history of aggression, violence, or assault. No sexual offenders. No 72 HH holds. [phone]                  [facility] is posting 1 beds. Negative covid required.  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 No aggressive or violent behavior or recent HX in the last 2 yrs. MH must be primary.) Always low acuity. [phone]; per call at 7:26 am to Sudarshan, they are at cap.              [facility] has 0 beds posted. Negative covid required.  Low acuity only. Violence and aggression capped.  [phone]; per call at 7:27 am to [facility], they have 1 bed avail.              [facility] is posting 2 beds. Low acuity, Negative covid required. [phone]; per call at 7:30 am to Elisabet, they are at cap but we can call again later to check again.              [facility]. Negative covid required.  [phone]. Per Autumn's call to STEPHANI Waterman around 6 am, they have 3 step-down/low acuity beds avail.              [facility] is posting 2 beds. Negative covid. LOW acuity. (No lines, drains, or tubes, oxygen, CPAP, IV, etc.) Must Have a Ride Home. [phone]; per call at 7:36 am to Sonam, they have 3 beds avail.              [facility] is posting 3 beds. Negative covid. (No lines, drains, or tubes, oxygen, CPAP, IV, etc.) [phone]                   [facility] is posting 6 beds. No covid test required. OUT OF STATE. [phone]; per call at 7:38 am to Hermila, they have 1 female adult and 2 child/adol beds avail.      7:56 PM SALIMA Celis, agreef to review pt, PPS faxed over clinical, pending cb.    10:04 PM SALIMA Fritz declined d/t pt's  observation status, can review tomorrow.          Pt remains on the work list pending appropriate bed availability.

## 2024-11-05 NOTE — MEDICATION SCRIBE - ADMISSION MEDICATION HISTORY
Medication Scribe Admission Medication History    Admission medication history is complete. The information provided in this note is only as accurate as the sources available at the time of the update.    Information Source(s): Patient via in-person    Pertinent Information: Pt reported taking medications on PTA medication list as prescribed, stated she is not taking Levothyroxine 25 mcg anymore, however, [facility] shows medication was last filled 10/28/24.   Pt first reported taken PTA medications yesterday at first attempted interview, however, pt seem very sleepy.   Attempted second interview with pt to clarify medications intake again, pt reported taking home medications the day before yesterday.  Called pt mother to clarify pt's home meds, but mom reported pt does own medications at home, will occasionally help pt fill medication organizer, but is not versed with pt's medications.      Changes made to PTA medication list:  Added: Cetirizine 10 mg tabs.  Deleted: None  Changed: None    Allergies reviewed with patient and updates made in EHR: yes    Medication History Completed By: Chloe Us 11/5/2024 1:20 PM    PTA Med List   Medication Sig Note Last Dose/Taking    acetaminophen (TYLENOL) 325 MG tablet Take 650 mg by mouth every 6 hours as needed for mild pain  11/3/2024    albuterol (PROAIR HFA/PROVENTIL HFA/VENTOLIN HFA) 108 (90 Base) MCG/ACT inhaler Inhale 1 puff into the lungs every 6 hours as needed for shortness of breath.  Past Month    ARIPiprazole lauroxil ER (ARISTADA) 882 MG/3.2ML intra-muscular Inject 882 mg into the muscle every 28 days On the 22nd of each month 11/5/2024: Per pt-last shot was in Oct. Done in clinic Taking    benztropine (COGENTIN) 1 MG tablet Take 1 mg by mouth 2 times daily.  11/3/2024    cetirizine (ZYRTEC) 10 MG tablet Take 10 mg by mouth daily.  Unknown    cloNIDine (CATAPRES) 0.1 MG tablet Take 1 tablet by mouth daily  11/3/2024    clotrimazole (LOTRIMIN) 1 % external cream  Apply topically 2 times daily.  11/3/2024    dicyclomine (BENTYL) 20 MG tablet Take 20 mg by mouth 2 times daily.  Unknown    divalproex sodium extended-release (DEPAKOTE ER) 250 MG 24 hr tablet Take 250 mg by mouth 2 times daily.  11/3/2024    docusate sodium (COLACE) 50 MG capsule Take 100 mg by mouth 2 times daily  11/3/2024    famotidine (PEPCID) 20 MG tablet Take 20 mg by mouth at bedtime.  11/3/2024    FLUoxetine (PROZAC) 40 MG capsule Take 80 mg by mouth daily  11/3/2024    hydrOXYzine (ATARAX) 50 MG tablet Take 50 mg by mouth 2 times daily as needed for anxiety  11/3/2024    lactase (LACTAID) 3000 UNIT tablet Take 1 tablet (3,000 Units) by mouth 3 times daily as needed for indigestion  11/3/2024    multivitamin w/minerals (MULTI-VITAMIN) tablet Take 1 tablet by mouth daily.  11/3/2024    OLANZapine zydis (ZYPREXA) 5 MG ODT Take 1 tablet (5 mg) by mouth At Bedtime  11/3/2024    ondansetron (ZOFRAN) 8 MG tablet Take 8 mg by mouth every 8 hours as needed for nausea.  11/3/2024    pantoprazole (PROTONIX) 40 MG EC tablet Take 40 mg by mouth daily  11/3/2024    polyethylene glycol (MIRALAX) 17 GM/Dose powder Take 1 Capful by mouth daily as needed for constipation. 11/5/2024: Has not started Taking As Needed    promethazine (PHENERGAN) 12.5 MG tablet Take 1 tablet (12.5 mg) by mouth every 6 hours as needed for nausea.  Unknown    senna-docusate (SENOKOT-S/PERICOLACE) 8.6-50 MG tablet Take 1 tablet by mouth 2 times daily as needed.  11/3/2024    sodium chloride 0.65 % nasal spray Spray 1 spray in nostril daily as needed. 11/5/2024: Has not started Taking As Needed    traZODone (DESYREL) 100 MG tablet Take 100 mg by mouth at bedtime.  11/3/2024    Vitamin D, Cholecalciferol, 25 MCG (1000 UT) TABS Take 1,000 Units by mouth daily   11/3/2024

## 2024-11-05 NOTE — PROGRESS NOTES
Writer attempted to see patient through ipad at 2:45pm and notified by nursing staff that patient is sleeping.

## 2024-11-05 NOTE — ED NOTES
Pt ate all breakfast tray. Personal hygiene items provided, new scrubs and bath wipes provided. Linen changed to pt's cart in .

## 2024-11-05 NOTE — TELEPHONE ENCOUNTER
9:31 AM Provided [facility]/CRN Pt MRN for review, awaiting cb.    1:03 PM 20/CRN provided PT MRN for review.    2:21 PM 20/CRN declined PT d/t needing 2:1 and accusations towards staff. [facility] and admit board updated.    2:26 PM [facility] declined d/t acuity.    3:02 PM Vimal at [facility] can review for potential admission.    3:24 PM Fax sent.    4:53 PM [facility] is requesting  if pt is still experiencing abdominal pain, vomiting and unsteadiness sx?  Behaviors at home? last seizure? Why did Pt reside in group home? What was the assault charge? Intellectual disability ? Any ADL assistance or needs that will require 1:1?    5:13 PM Stormy will will follow up after if able to complete reassessment and obtains any of the following information. Behaviors at home that makes it unsafe? Why did Pt reside in group home? What was the assault charge?    5:19 PM Per [facility] ED RN Pt has not complained of any sx recently this morning she thought she had vomited but per RN following it did not appear to be vomit, pt eating and drinking appropriately. No concern of seizure.     5:24 PM Notified [facility], awaiting updated consult for rereview.    R: [place]  Access Inpatient Bed Call Log 11/5/24 9:00 AM    Intake has called facilities that have not updated the bed status within the last 12 hours.                             [facility] is at capacity.           [facility] is posting 0 beds. [phone] Per call @9:21am  [facility] is posting 0 beds. Negative covid required.  [facility] is posting 0 beds. Neg covid. No high school/[facility]-psych.   [place] [facility] is posting 0 beds. [phone]  [facility] is posting 0 beds. [phone]   [facility] is posting 3 beds. Negative covid. 11/4 PC d/t observation status, can review tm   [facility] is posting 0 beds [phone].    [facility] is posting 3 beds. LOW acuity. Ages 12+. Neg covid- [phone]  [facility] has 1 bed posted.  No aggression. Negative Covid. Low acuity.  [facility] is posting 0 beds. Low acuity only. Neg covid.  [phone]   [facility] is posting 0 beds. Low acuity. No current aggression.    [facility] is posting 0 beds. Negative covid. [phone]   [facility] is posting 0 beds available. Negative covid.  [phone].      [facility] is posting 1 bed. Low acuity. 72 HH hold preferred. [facility] unit. Negative covid required. [phone]   [facility] is posting 0 beds. Low acuity only. Neg covid.  [phone]   [facility] in [place] is posting 1 bed.  Negative covid required.   Vol only, No history of aggression, violence, or assault. No sexual offenders. No 72 HH holds. [phone]     [facility] is posting 1 bed. Negative covid required.  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 No aggressive or violent behavior or recent HX in the last 2 yrs. MH must be primary.) Always low acuity. [phone]   [facility] has 1 bed posted. Negative covid required.  Low acuity only. Violence and aggression capped.  [phone] Per call @9:17am, can call after 9:45am to check availability.  [facility] is posting 2 beds. Low acuity, Negative covid required. [phone]   [facility] posting 5 beds. Negative covid required.  [phone]   [facility] is posting 2 beds. Negative covid. LOW acuity. (No lines, drains, or tubes, oxygen, CPAP, IV, etc.) Must Have a Ride Home. [phone]   [facility] is posting 3 beds. Negative covid. (No. lines, drains, or tubes, oxygen, CPAP, IV, etc.) [phone]   [facility] is posting 10 beds. No covid test required. [phone] Per call @9:03am    Pt remains on the work list pending appropriate bed availability.
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
## 2024-11-05 NOTE — PROGRESS NOTES
"Triage & Transition Services, Extended Care       Patient: Sadaf goes by \"Sadaf,\" uses she/her pronouns  Date of Service: November 5, 2024  Site of Service: [facility] EMERGENCY DEPARTMENT                             UREDH-D  Patient was seen no In person  Mode of Assessment:      Patient is followed related to: boarding status, long wait time for admission  Notable observations from today's encounter include: Patient arrived via EMS from home for a mental health assessment due to concerns for suicidal ideation with a plan to cut herself. She reported that she had pocket knife and was going slit her wrists.  The care team is working towards the following: Demonstrate Absence of Suicide Behavior for at least 24 hours  Significant status changes: no    Case Management included: Case Management Included: collaborating with patient's support system  Details on Collaborating with Patient's Support System: Spoke with patient's MH CM Lesley Perez of [facility] ([phone])    Summary of Interaction: Lesley reports this patient has recently been having more crisis events and ending up at the emergency department for stabilization. They had a team meeting last week where they discussed with patient various services for her to consider. Living with her mother has been stressful and they offered Sadaf to have more services with Positive Support Staff, Individualized Home Supports, or having Integrated Community Supports in a 24/7 customized living facility where she could have her own apartment. Sadaf is supposed to discuss with her financial benefits worker and come up with a plan.    Recommendations: Final Disposition / Recommended Care Path: inpatient mental health  Clinical Substantiation: Sadaf is a 24 year old female who presented to the ED via EMS after reporting suicidal ideation from her mother's house. They found a pocket knife on her and took it away. Pt has a prior history of many ED " visits, has a noted care plan, a self-reported 8 suicide attempts and historical dx of Bipolar 1, Borderline Personality Disorder, ADHD, and Intellectual Disabilities. She was halled in the ED and was cooperative to meet with writer in the consult room.    Summary: Sadaf is a 24 year old female who presented to the ED via EMS after reporting suicidal ideation from her mother's house. They found a pocket knife on her and took it away. Pt has a prior history of many ED visits, has a noted care plan, a self-reported 8 suicide attempts and historical dx of Bipolar 1, Borderline Personality Disorder, ADHD, and Intellectual Disabilities. She was halled in the ED and was cooperative to meet with writer in the consult room.    Legal Status: County: [facility]  Legal Status at Admission: 72 Hour Hold  72 Hour Hold - Date/Time Initiated: Mon 11/4/24 at 2:46pm  72 Hour Hold - Date/Time Ends: Thurs 11/7/24 at 2:46pm    Enmanuel Mcmahon, [facility]   Licensed Mental Health Professional (LMHP), [facility]  [phone]

## 2024-11-06 ENCOUNTER — TELEPHONE (OUTPATIENT)
Dept: BEHAVIORAL HEALTH | Facility: CLINIC | Age: 24
End: 2024-11-06
Payer: MEDICARE

## 2024-11-06 PROCEDURE — 250N000013 HC RX MED GY IP 250 OP 250 PS 637: Performed by: EMERGENCY MEDICINE

## 2024-11-06 PROCEDURE — 250N000013 HC RX MED GY IP 250 OP 250 PS 637: Performed by: CLINICAL NURSE SPECIALIST

## 2024-11-06 RX ORDER — OLANZAPINE 5 MG/1
5 TABLET, ORALLY DISINTEGRATING ORAL ONCE
Status: COMPLETED | OUTPATIENT
Start: 2024-11-06 | End: 2024-11-06

## 2024-11-06 RX ORDER — OLANZAPINE 10 MG/2ML
10 INJECTION, POWDER, FOR SOLUTION INTRAMUSCULAR ONCE
Status: COMPLETED | OUTPATIENT
Start: 2024-11-06 | End: 2024-11-06

## 2024-11-06 RX ADMIN — Medication 1 TABLET: at 09:41

## 2024-11-06 RX ADMIN — DICYCLOMINE HYDROCHLORIDE 20 MG: 20 TABLET ORAL at 22:23

## 2024-11-06 RX ADMIN — PANTOPRAZOLE SODIUM 40 MG: 20 TABLET, DELAYED RELEASE ORAL at 09:40

## 2024-11-06 RX ADMIN — LEVOTHYROXINE SODIUM 25 MCG: 25 TABLET ORAL at 09:41

## 2024-11-06 RX ADMIN — TRAZODONE HYDROCHLORIDE 100 MG: 100 TABLET ORAL at 02:58

## 2024-11-06 RX ADMIN — FAMOTIDINE 20 MG: 20 TABLET ORAL at 22:23

## 2024-11-06 RX ADMIN — DIVALPROEX SODIUM 250 MG: 250 TABLET, FILM COATED, EXTENDED RELEASE ORAL at 22:22

## 2024-11-06 RX ADMIN — Medication 25 MCG: at 09:40

## 2024-11-06 RX ADMIN — FLUOXETINE HYDROCHLORIDE 80 MG: 40 CAPSULE ORAL at 09:41

## 2024-11-06 RX ADMIN — HYDROXYZINE HYDROCHLORIDE 50 MG: 50 TABLET, FILM COATED ORAL at 17:51

## 2024-11-06 RX ADMIN — DOCUSATE SODIUM 100 MG: 100 CAPSULE, LIQUID FILLED ORAL at 09:40

## 2024-11-06 RX ADMIN — DIVALPROEX SODIUM 250 MG: 250 TABLET, FILM COATED, EXTENDED RELEASE ORAL at 09:40

## 2024-11-06 RX ADMIN — CLONIDINE HYDROCHLORIDE 0.1 MG: 0.1 TABLET ORAL at 02:57

## 2024-11-06 RX ADMIN — CLONIDINE HYDROCHLORIDE 0.1 MG: 0.1 TABLET ORAL at 22:22

## 2024-11-06 RX ADMIN — CETIRIZINE HYDROCHLORIDE 10 MG: 10 TABLET, FILM COATED ORAL at 09:40

## 2024-11-06 RX ADMIN — ACETAMINOPHEN 650 MG: 325 TABLET, FILM COATED ORAL at 12:55

## 2024-11-06 RX ADMIN — DICYCLOMINE HYDROCHLORIDE 20 MG: 20 TABLET ORAL at 09:41

## 2024-11-06 NOTE — TELEPHONE ENCOUNTER
8:46 AM: Intake paged Pamela to present Pt for station 32.    10:06 AM: Second page sent for review.    10:08 AM: Writer was reminded that unit is case-by-case.    10:09 AM: Intake proved CRN on station 32 MRN for case-by-case review.    10:51 AM: Station 32 declined d/t Pt acuity and not having a seclusion room available.    11:04 AM: Intake called [place] to check for bed availability. No answer.    11:26 AM: Intake called [facility]. No beds available, they will reassess tomorrow around 9AM.    12:30 PM: Intake called CRN for case-by-case review. Intake to call back.    12:54 PM: Intake called to provide MRN to CRN and was informed that Pt is too acute for review. Pt was declined yesterday.     2:18 PM: Intake received a call from the ED inquiring about multiple declines for different units. Intake notified RN that [facility] and [place] review after confirming transportation. RN informed that Mother would  Pt after discharge and that contact info can be found under patient contacts.    2:31 PM: Intake called [facility] to check on bed availability. [facility] will call back.    3:08 PM: Saran from [facility] stated that they are able to review.    3:33 PM: Face sheet and labs sent to [facility] via fax.              R: [place]  Access Inpatient Bed Call Log 11/6/24 7:50 AM   Intake has called facilities that have not updated the bed status within the last 12 hours.                                 [facility] is at capacity.            [facility] is posting 0 beds. [phone]. Per call at 7:53 am to APS no beds available.   [facility] is posting 0 beds. Negative covid required.   [facility] is posting 0 beds. Neg covid. No high school/Krystal-psych.    [facility] is posting 0 beds. [phone]   [facility] is posting 0 beds. [phone]    [facility] is posting 4 beds. Negative covid.    [facility] is posting 0 beds [phone].       [facility] is posting 0 beds. LOW acuity. Ages 12+. Neg  covid- [phone]   [facility] has 1 bed posted. No aggression. Negative Covid. Low acuity.   [facility] is posting 0 beds. Low acuity only. Neg covid.  [phone]    [facility] is posting 1 beds. Low acuity. No current aggression.     [facility] is posting 0 beds. Negative covid. [phone] Per call at 7:55 am to Tiffanie no beds available Intake can follow up later for [facility] bed update.   [facility] ([place]) is posting 0 beds available. Negative covid.  [phone].       [facility] is posting 0 bed. Low acuity. 72 HH hold preferred. Krystal unit. Negative covid required. [phone]. Per call at 7:58 am to Gaby currently FULL.   [facility] ([place] [facility] is posting 0 beds. Low acuity only. Neg covid.  [phone]    [facility] [place] is posting 5 bed.  Negative covid required.   Vol only, No history of aggression, violence, or assault. No sexual offenders. No 72 HH holds. [phone]        [facility] is posting 2 bed. Negative covid required.  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 No aggressive or violent behavior or recent HX in the last 2 yrs. MH must be primary.) Always low acuity. [phone]    [facility] has 0 bed posted. Negative covid required.  Low acuity only. Violence and aggression capped.  [phone]    [facility] is posting 2 beds. Low acuity, Negative covid required. [phone]. Per call at 8:00 am to Autumn NIEVES 1 bed poss M or F.     [facility] posting 5 beds. Negative covid required.  [phone]    [facility] is [facility] 2 beds. Negative covid. LOW acuity. (No lines, drains, or tubes, oxygen, CPAP, IV, etc.) Must Have a Ride Home. [phone]. Per call at 8:03 am to Brianne 2 beds available for screening.   [facility] is posting 3 beds. Negative covid. (No. lines, drains, or tubes, oxygen,  CPAP, IV, etc.) [phone]. Per call at 8:05 am to IP 3 general and 1 high  bed.        Pt remains on the work list pending appropriate bed availability.

## 2024-11-06 NOTE — ED NOTES
IP MH Referral Acuity Rating Score (RARS)    LMHP complete at referral to IP MH, with DEC; and, daily while awaiting IP MH placement. Call score to PPS.  CRITERIA SCORING   New 72 HH and Involuntary for IP MH (not adolescent) 0/1   Boarding over 24 hours 1/1   Vulnerable adult at least 55+ with multiple co morbidities; or, Patient age 11 or under 0/1   Suicide ideation without relief of precipitating factors 1/1   Current plan for suicide 1/1   Current plan for homicide 0/1   Imminent risk or actual attempt to seriously harm another without relief of factors precipitating the attempt 0/1   Severe dysfunction in daily living (ex: complete neglect for self care, extreme disruption in vegetative function, extreme deterioration in social interactions) 0/1   Recent (last 2 weeks) or current physical aggression in the ED 0/1   Restraints or seclusion episode in ED 0/1   Verbal aggression, agitation, yelling, etc., while in the ED 1/1   Active psychosis with psychomotor agitation or catatonia 0/1   Need for constant or near constant redirection (from leaving, from others, etc).  1/1   Intrusive or disruptive behaviors 1/1   TOTAL Acuity Total Score: 6

## 2024-11-06 NOTE — ED PROVIDER NOTES
Emergency Department I-PASS Sign-out      Illness Severity: Stable    Patient Summary:  24 year old female with pertinent PMH of depression with suicidal thoughts who presented with worsening depression with suicidal thoughts and a plan to overdose on pills; she has not taken her prescriptions meds in the last 2-3 days just one week after discharge from here on a two week stay    ED Course/treatment plan: DEC assessment    Clinical Impression:  (R45.851) Suicidal ideation    (R45.851) Has access to planned means of suicide      Edited by: Ramo Pacheco MD at 2024 2124    Action List:  Tests to Follow-up:  None    Medications Reconciled/Ordered:  Yes    ED Mental Health Boarding Order Set Used for Diet/PRNs/Other:  Yes    DEC, Extended Care, Psych Consult Orders:  DEC assessment completed.     Situational Awareness & Contingency Plannin Hour Hold Status:  Voluntary, would reassess if wanting to leave.  Active Orders  Legal  Emergency Hospitalization Hold (72 Hr Hold)  Frequency: Effective Now  Start Date/Time: 24 1446   Number of Occurrences: Until Specified    Disposition:  Admit/Transfer to Behavioral Health    Boarding subsequent shift/day updates:    Continue plan for MH admit.  Patient cooperative today.    Edited by: Rakesh Estrella MD at 2024 1603    Synthesis & Events after sign-out:  Patient was apparently engaging in some self-harm over the night shift.  Was given Zyprexa is now resting comfortably and in no distress.  Addendum:  The patient was also seen by the extended care DEC , please refer to their extensive note/evaluation which was reviewed with me and is documented in EPIC on 2024 for further details.  Patient appears to be moving back towards her baseline.  She is currently on a 72-hour hold.  She is voluntary for admission, would like to speak to her mother.   feels that she may be returning to baseline and potentially could discharged if she  continued to stabilize while waiting in the ED.  History of many ED visits, formally in a group home, has not benefited substantially from previous inpatient admissions.  She still will not engage in safety planning but agrees to voluntary admission and has not engaged in any aggressive or self-injurious behavior today thus her 72-hour hold was removed.  If she attempts to leave prior to our recommendation for discharge the hold could be replaced.          Leo Lowe MD   Emergency Medicine     Leo Lowe MD  11/06/24 0887       Leo Lowe MD  11/06/24 4721

## 2024-11-06 NOTE — PROGRESS NOTES
"Triage & Transition Services, Extended Care     Therapy Progress Note    Patient: Sadaf goes by \"Sadaf,\" uses she/her pronouns  Date of Service: November 6, 2024  Site of Service: [facility] EMERGENCY DEPARTMENT                             UREDH-D  Patient was seen yes  Mode of Assessment: Virtual: iPad    Presentation Summary: Pt is seen by extended care for therapeutic check-in and reassessment. Exchanged greeting, introduced self and role. Pt was observed to be able to participate in the assessment as evidenced by verbal consent. Pt is calm and cooperative during this interview. Pt endorsed feeling sad, depressed, and hopeless. She did tear up and sob when she spoke about her father who passed away about 4 years ago. Pt has a history of frequent ER visits as a maldaptive coping skill to manage her need for attention. Pt has indicated in previous DEC assessments, \"I was not getting the attention I need\". Pt mother reports that Pt is there alone during the day and she can \"spiral\" if she is overwhelmed by her thoughts, which she ends up calling 911 and be brought to the ER. During this interview the Pt reports at home she has people yelling at her. She says it is 24/7 and states it is \"people I live with. My sisters boyfriend.\" Writer discussed with her the treatment plans she has been discussing with her  and guardian. Discussed the options of increasing her PSS staff sessions, additional IHS, and the potential of ICS services of  integrated community services. She was more optimistic when discussing the idea of having her own apartment within the ICS services model. Pt continues to endorse having suicidal ideation and potential plan to take additional medications her to cut herself. Discussed with her the idea of transtion from the ED to a crisis residence. Pt indicates she was not interested in crisis residence and indicates she would like to be admitted for IPMH. Explored with patient " protective factors and reasons to continue living. Pt identifes she loves her nephew who is nearly two years old and identifies other family members that would love her. Pt is able to identify activities she enjoys such as: television/movies/videos, puzzles, music, group/social activities, games. She reports that she gets bored at home during the day when family is at work. She expresses an interest in working with her  and financial worker to pursue having her own apartment.    Therapeutic Intervention(s) Provided: Explored strategies for self-soothing., Engaged in guided discovery, explored patient's perspectives and helped expand them through socratic dialogue., Taught the link between thoughts, feelings, and behaviors.    Current Symptoms:   negativistic, impaired decision making, thoughts of death/suicide, decreased libido, crying or feels like crying, helplessness, sadness, hoplessness racing thoughts, excessive worry, anxious impulsive      Mental Status Exam   Affect: Labile  Appearance: Appropriate  Attention Span/Concentration: Attentive  Eye Contact: Engaged    Fund of Knowledge: Appropriate   Language /Speech Content: Fluent  Language /Speech Volume: Normal  Language /Speech Rate/Productions: Normal  Recent Memory: Intact  Remote Memory: Intact  Mood: Sad, Depressed  Orientation to Person: Yes   Orientation to Place: Yes  Orientation to Time of Day: Yes  Orientation to Date: Yes     Situation (Do they understand why they are here?): Yes  Psychomotor Behavior: Normal  Thought Content: Suicidal  Thought Form: Intact    Treatment Objective(s) Addressed: rapport building, identifying and practicing coping strategies, identifying an appropriate aftercare plan, assessing safety    Patient Response to Interventions: acceptance expressed, verbalizes understanding    Progress Towards Goals: Patient Reports Symptoms Are: improving  Patient Progress Toward Goals: is making progress  Comment: Pt has been  removed from a 72hh. Pt remains voluntary in the hospital. The goal is to have her engage in safety planning and hopefully return to her mother's house.  Next Step to Work Toward Discharge: patient ability to engage in safety planning  Ability to Engage Comment: Writer will meet with patient tomorrow and attempt to engage in safety planning. Writer to talk with patient about current therapist to determine if she needs more frequent sessions.    Case Management: Case Management Included: collaborating with patient's support system  Details on Collaborating with Patient's Support System: Yarely Ross (Mother) [phone]  Summary of Interaction: Writer spoke with patient's mother regarding dispostion and planning. Yareyl mentioned that patient does not do well when they are all at work and she is left alone to spiral with her own thoughts. She indicates that Sadaf can be easily stressed which causes her anxiety and the feelings of needing to seek attention. Disscussed the importance of this patient having more daily services through PPS and IHS and the eventual ICS of having an integrated community supports within her own assisted living apartment. She is in agreement with this due to patient frequent calling 911 and going to the ER. Discussed with Yarely that patient seems to be at her baseline compared to similar ER visits and notified her of the ER decision to work with her overnight and then a potential discharge for her tomorrow. Yarely is in agreemeent with the plan.    Plan: inpatient mental health  yes provider Consulted with Dr. Lowe and psychiatric provider Mara Burrows regarding disposition and planning. The recommendation is to drop the 72hh and continue to monitor patient overnight and work toward safety planning tomorrow.  yes    Clinical Substantiation: Sadaf is a 24 year old female who presented to the ED via EMS after reporting suicidal ideation from her mother's house. They found a pocket  knife on her and took it away. Pt has a prior history of many ED visits, has a noted care plan, a self-reported 8 suicide attempts and historical dx of Bipolar 1, Borderline Personality Disorder, ADHD, and Intellectual Disabilities. She was halled in the ED and was cooperative to meet with writer in the consult room. Pt is reports her symptoms have decreased but is currently feeling unsafe and endorses suicidal ideation.The recommendation is to drop the 72hh and continue to monitor patient overnight and work toward safety planning tomorrow.    Legal Status: Legal Status at Admission: Voluntary/Patient has signed consent for treatment  72 Hour Hold - Date/Time Initiated: 72hh removed  72 Hour Hold - Date/Time Ends: 72hh removed    Session Status: Time session started: 1136  Time session ended: 1152  Session Duration (minutes): 16 minutes  Session Number: 1  Anticipated number of sessions or this episode of care: 3  Date of most recent diagnostic assessment: 11/02/24    Time Spent: 16 minutes    CPT Code: CPT Codes: 34418 - Psychotherapy (with patient) - 30 (16-37*) min    Diagnosis:   Patient Active Problem List   Diagnosis Code    MENTAL HEALTH     Suicidal ideation R45.851    Suicidal ideations R45.851    Intellectual disability F79    Bipolar affective disorder, currently depressed, moderate (H) F31.32    Borderline personality disorder (H) F60.3    Morbid obesity (H) E66.01    Unspecified mood (affective) disorder (H) F39    Chronic constipation K59.09    History of suicide attempt Z91.51    Hyperhidrosis R61    Major depressive disorder, recurrent, in full remission (H) F33.42    Vitamin D deficiency E55.9    Syncope R55    Seizure-like activity (H) R56.9    Syncope, unspecified syncope type R55    Bipolar affective disorder (H) F31.9       Primary Problem This Admission: Active Hospital Problems    Bipolar affective disorder, currently depressed, moderate (H)      *Borderline personality disorder (H)       Intellectual disability      Suicidal ideation    F60.3      Enmanuel Mcmahon, [facility]   Licensed Mental Health Professional (LMHP), [facility]  [phone]

## 2024-11-06 NOTE — ED PROVIDER NOTES
I was informed by the pt's nurse that she has been increasingly difficult to redirect and at times striking herself in her head. No evidence of injury. Zyprexa 5mg po ordered.     Lynne Nieves MD  11/06/24 5602    I was told by the pt's nurse that she refused to take med, and is now attempting to bite self. IM zyprexa odered.       Lynne Nieves MD  11/06/24 6196

## 2024-11-06 NOTE — ED NOTES
Writer with intent to meet with this patient via Ipad for extended care follow-up, called the ED on two separate occasions this morning for assistance, but was unable to speak with care team members of this patient. Writer unable to see patient.

## 2024-11-06 NOTE — TELEPHONE ENCOUNTER
R:  8:51pm- Per Stormy, her consult is completed and available for [facility] Review.     9:23pm- No one picked up call at [facility].        [facility] Access Inpatient Bed Call Log 11/5/24  @9PM:  Intake has called facilities that have not updated the bed status within the last 12 hours.                             [facility] is at capacity.           [facility] is posting 0 beds. [phone] Per call @9:21am  [facility] is posting 0 beds. Negative covid required.  [facility] is posting 0 beds. Neg covid. No high school/Krystal-psych.   [facility] is posting 0 beds. [phone]  [facility] is posting 0 beds. [phone]   [facility] is posting 3 Young Adult beds. Negative covid. Per Saran, they had 1 bed. No response via phone call at 9:23pm.   [facility] [place] [facility] is posting 0 beds [phone].    [facility] is posting 3 beds. LOW acuity. Ages 12+. Neg covid- [phone]  [facility] has 1 bed posted. No aggression. Negative Covid. Low acuity.  [facility] is posting 1 beds. Low acuity only. Neg covid.  [phone]   [facility] is posting 2 beds. Low acuity. No current aggression.    [facility] is posting 0 beds. Negative covid. [phone]   [facility] ([place] is posting 0 beds available. Negative covid.  [phone].      [facility] is posting 1 bed. Low acuity. 72 HH hold preferred. Krystal unit. Negative covid required. [phone]   [facility] is posting 0 beds. Low acuity only. Neg covid.  [phone]   [facility] [place] is posting 5 bed.  Negative covid required.   Vol only, No history of aggression, violence, or assault. No sexual offenders. No 72 HH holds. [phone]     [facility] is posting 1 bed. Negative covid required.  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 No aggressive or violent behavior or recent  HX in the last 2 yrs. MH must be primary.) Always low acuity. [phone]   [facility] has 0 bed posted. Negative covid required.  Low acuity only. Violence and aggression capped.  [phone] Per call @9:17am, can call after 9:45am to check availability.  [facility] is posting 2 beds. Low acuity, Negative covid required. [phone]   [facility] posting 5 beds. Negative covid required.  [phone]   [facility] [place] is posting 2 beds. Negative covid. LOW acuity. (No lines, drains, or tubes, oxygen, CPAP, IV, etc.) Must Have a Ride Home. [phone]   [facility] is posting 3 beds. Negative covid. (No. lines, drains, or tubes, oxygen, CPAP, IV, etc.) [phone]     Pt remains on the work list pending appropriate bed availability.

## 2024-11-06 NOTE — ED NOTES
Pt is sitting at the edge of the bed. Writer and MD helped patient back to bed due to risk of falling. Side rails put up. Immediately pt got up lower the side rail and again sat at the edge of the bed again. Patient refused to talk or answer to writer at this time

## 2024-11-06 NOTE — TELEPHONE ENCOUNTER
R: [place]  Access Inpatient Bed Call Log  11/6/2024 12:26 AM  Intake has called facilities that have not updated their bed status within the last 12 hours.??      ADULTS:     *METRO  [place] -- [facility]: @ Cap per website.  [place] -- [facility]:  @ Cap per website.    [place] -- [facility]: @ Cap per website.   [place] -- [facility]: @ Cap per website.    [place] -- [facility]: @ Cap per website.  [facility]: @ Cap per website.    [place] -- [facility] @ Posting 3 beds. Ages 18-35, Voluntary only, COVID test req'd, NO aggression, physical or sexual assault, violence hx or drug abuse, or psychosis - declined due to acuity  [place] -- [facility]: @ Cap per website.  [place] -- [facility]: @ cap per website.  [place] -- [facility]:  @ Cap per website.      *[facility]: @ Cap per website. Mixed unit/Low acuity only.   [facility]: @ Posting 1 bed. Low acuity, No aggression  - Not approp due to pt acuity  [facility]: @ cap per website.  [facility]:  @ Posting 1 bed. Low acuity only. No current aggression. - Not approp due to pt acuity  [facility]:  @ posting 1 bed. COVID negative test req. Lower acuity only. No Aggression.   [facility]: @ Cap per website. Low acuity only. Prefer med adjustments placement.  . No aggression  [facility]:  @ Cap per website. COVID negative test req. Lower acuity only. No Aggression.  [place] -- [facility]: @ Cap per website. NO reviews after 10PM. Low acuity only.    [place] -- [facility]: @ Posting 5 beds. No hx of aggression. No sexual offenders. Voluntary patients only - Pt is not vol  [place] -- [facility]:  @ Posting 2 beds. Low acuity only. Must have the cognitive ability to do programming. No aggressive or violent behavior or recent HX in the last 2 yrs. MH must be primary.  - Not approp due to pt acuity  [place] -- [facility]: @ Cap per  website. COVID negative test. Must be low acuity ONLY.   [place] -- [facility]: @ Cap per website.  Low acuity. Negative Covid. -12:34 AM Per Roni, they are capped.  [place] -- [facility]: @Posting 5 beds. Negative COVID test - declined due to acuity  [facility]: @ Posting 2 beds. No hx of aggression/assault. No lines, drains or tubes. Does not provide detox or CD treatment.    [place] -- [facility]: @ Posting 3 beds. Mixed unit/Low acuity/no medical devices - IV, CPAP etc. Negative Covid.).                                                                                                                                 Pt remains on waitlist pending appropriate placement availability.

## 2024-11-06 NOTE — TELEPHONE ENCOUNTER
R: [facility] declines pt for placement.  Staff stated pt would require a higher level of care than they provide.      R: [facility] Access Inpatient Bed Call Log 11/6/24  @6:00 PM:  Intake has called facilities that have not updated the bed status within the last 12 hours.                               [facility] is at capacity.            [facility] is posting 0 beds. [phone]. Per call at 7:53 am to APS no beds available.   [facility] is posting 0 beds. Negative covid required.   [facility] is posting 0 beds. Neg covid. No high school/Krystal-psych.    [place] is posting 0 beds. [phone]   [facility] is posting 0 beds. [phone]    [facility] is posting 2 Young Adult beds. Negative covid.    [facility] is posting 0 beds [phone].       [facility] is posting 1 beds. LOW acuity. Ages 12+. Neg covid- [phone]   [facility] has 2 bed posted. No aggression. Negative Covid. Low acuity.   [facility] is posting 0 beds. Low acuity only. Neg covid.  [phone]    [facility] is posting 1 beds. Low acuity. No current aggression.     [facility] is posting 0 beds. Negative covid. [phone] Per call at 7:55 am to Tiffanie no beds available Intake can follow up later for Adol bed update.   [facility] is posting 0 beds available. Negative covid.  [phone].       [facility] is posting 0 bed. Low acuity. 72 HH hold preferred. Krystal unit. Negative covid required. [phone]. Per call at 7:58 am to Gaby currently FULL.   [facility] is posting 0 beds. Low acuity only. Neg covid.  [phone]    [facility] [place] is posting 3 bed.  Negative covid required.   Vol only, No history of aggression, violence, or assault. No sexual offenders. No 72 HH holds. [phone]        [facility] is posting 2 bed. Negative covid  required.  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 No aggressive or violent behavior or recent HX in the last 2 yrs. MH must be primary.) Always low acuity. [phone]    [facility] has 0 bed posted. Negative covid required.  Low acuity only. Violence and aggression capped.  [phone]    [facility] is posting 2 beds. Low acuity, Negative covid required. [phone]. Per call at 8:00 am to Autumn RN 1 bed poss M or F.     [facility] posting 5 beds. Negative covid required.  [phone]    [facility] is posting 2 beds. Negative covid. LOW acuity. (No lines, drains, or tubes, oxygen, CPAP, IV, etc.) Must Have a Ride Home. [phone]. Per call at 8:03 am to Brianne 2 beds available for screening.   [facility] is posting 3 beds. Negative covid. (No. lines, drains, or tubes, oxygen, CPAP, IV, etc.) [phone]. Per call at 8:05 am to IP 3 general and 1 high MH bed.   [facility] is posting 16 beds. No covid test required. [phone]. Per call at 8:06 am to Cindy 5 Adol/Kids and 15 adult beds available.   OUT OF STATE      Pt remains on the work list pending appropriate bed availability.                 6:20 PM Awaiting a call from [facility], they may be able to review pt for a high acuity bed.    7:45 PM per CRN at [facility], pt not appropriate due to Hx of aggression and currently having psychotic pt's in high acuity unit.  Pt would not be a good fit at this time.

## 2024-11-07 ENCOUNTER — TELEPHONE (OUTPATIENT)
Dept: BEHAVIORAL HEALTH | Facility: CLINIC | Age: 24
End: 2024-11-07
Payer: MEDICARE

## 2024-11-07 PROCEDURE — 250N000013 HC RX MED GY IP 250 OP 250 PS 637: Performed by: FAMILY MEDICINE

## 2024-11-07 PROCEDURE — 250N000013 HC RX MED GY IP 250 OP 250 PS 637: Performed by: EMERGENCY MEDICINE

## 2024-11-07 PROCEDURE — 250N000013 HC RX MED GY IP 250 OP 250 PS 637: Performed by: CLINICAL NURSE SPECIALIST

## 2024-11-07 PROCEDURE — 99233 SBSQ HOSP IP/OBS HIGH 50: CPT | Performed by: PSYCHIATRY & NEUROLOGY

## 2024-11-07 RX ADMIN — TRAZODONE HYDROCHLORIDE 100 MG: 100 TABLET ORAL at 00:07

## 2024-11-07 RX ADMIN — DOCUSATE SODIUM 100 MG: 100 CAPSULE, LIQUID FILLED ORAL at 00:07

## 2024-11-07 RX ADMIN — DIVALPROEX SODIUM 250 MG: 250 TABLET, FILM COATED, EXTENDED RELEASE ORAL at 20:59

## 2024-11-07 RX ADMIN — TRAZODONE HYDROCHLORIDE 100 MG: 100 TABLET ORAL at 21:19

## 2024-11-07 RX ADMIN — BENZTROPINE MESYLATE 1 MG: 1 TABLET ORAL at 20:59

## 2024-11-07 RX ADMIN — ACETAMINOPHEN 650 MG: 325 TABLET, FILM COATED ORAL at 16:02

## 2024-11-07 RX ADMIN — CETIRIZINE HYDROCHLORIDE 10 MG: 10 TABLET, FILM COATED ORAL at 08:13

## 2024-11-07 RX ADMIN — PANTOPRAZOLE SODIUM 40 MG: 20 TABLET, DELAYED RELEASE ORAL at 08:12

## 2024-11-07 RX ADMIN — MICONAZOLE NITRATE: 20 POWDER TOPICAL at 21:20

## 2024-11-07 RX ADMIN — DIVALPROEX SODIUM 250 MG: 250 TABLET, FILM COATED, EXTENDED RELEASE ORAL at 08:12

## 2024-11-07 RX ADMIN — FLUOXETINE HYDROCHLORIDE 80 MG: 40 CAPSULE ORAL at 08:13

## 2024-11-07 RX ADMIN — FAMOTIDINE 20 MG: 20 TABLET ORAL at 20:59

## 2024-11-07 RX ADMIN — DICYCLOMINE HYDROCHLORIDE 20 MG: 20 TABLET ORAL at 08:16

## 2024-11-07 RX ADMIN — DICYCLOMINE HYDROCHLORIDE 20 MG: 20 TABLET ORAL at 21:19

## 2024-11-07 RX ADMIN — LEVOTHYROXINE SODIUM 25 MCG: 25 TABLET ORAL at 08:16

## 2024-11-07 RX ADMIN — DOCUSATE SODIUM 100 MG: 100 CAPSULE, LIQUID FILLED ORAL at 08:12

## 2024-11-07 RX ADMIN — DOCUSATE SODIUM 100 MG: 100 CAPSULE, LIQUID FILLED ORAL at 20:59

## 2024-11-07 RX ADMIN — Medication 1 TABLET: at 08:13

## 2024-11-07 RX ADMIN — MICONAZOLE NITRATE: 20 POWDER TOPICAL at 13:36

## 2024-11-07 RX ADMIN — Medication 25 MCG: at 08:12

## 2024-11-07 RX ADMIN — CLONIDINE HYDROCHLORIDE 0.1 MG: 0.1 TABLET ORAL at 20:59

## 2024-11-07 RX ADMIN — HYDROXYZINE HYDROCHLORIDE 50 MG: 50 TABLET, FILM COATED ORAL at 16:21

## 2024-11-07 NOTE — TELEPHONE ENCOUNTER
R: [facility] Access Inpatient Bed Call Log  11/7/24 @ 1:00am   Intake has called facilities that have not updated their bed status within the last 12 hours.     *METRO:  [place] -- [facility]: @ capacity.  [facility]: @ cap per website. Reporting no reviews overnight.  [place] -- [facility]: @ cap per website. Low acuity  [place] -- [facility]: @ cap per website. Low acuity only.  [place] -- [facility]: @ cap per website.  [facility]: @ cap per website.   [facility] beds: POSTING 1 BED. Ages 18-35, Voluntary only, NO aggression/physical/sexual assault, violence hx or drug abuse, or psychosis. Negative Covid  [place] [facility]: @ cap per website.   [place] -- [facility]: @ cap per website.  [place] -- [facility]: @ cap per website. Do not review overnight.     *STATEWIDE (by distance):  [facility]: POSTING 1 BED. Mixed unit. Ages 12 and up/Low acuity only.   [facility] - POSTING 2 BEDS. Low acuity, No aggression.    [facility] - @ cap per website.   [facility] - POSTING 1 BED. Low acuity only. No current aggression.  [facility] - @ cap per website. Negative Covid. Lower acuity only.  [facility] - @ cap per website. Low acuity only. Prefer med-adjustment placements.   [facility] - @ cap per website. No aggression. - Only Low Acuity reviews.   [place] - [facility]: @ cap per website. No aggressive behaviors. Do not review overnight.  [place] -- [facility]: POSTING 4 BEDS.  No hx of aggression. No sexual offenders. Voluntary patients only.  [place] -- [facility]: POSTING 1 BED. Low acuity only. Must be able to do programming. No aggression/violent behavior in 2 years. No CD treatment.  [place] -- [facility]: @ cap per website. Negative Covid test. Must be low acuity ONLY.  [place] -- [facility]: POSTING 2 BEDS. Low  acuity. Negative Covid.    [place] -- [facility]: POSTING 5 BEDS. - REPORT 2 ICU & 4 LOW ACUITY BEDS.  [facility]: POSTING 2 BEDS. No hx of aggression/assault. No lines, drains or tubes. Does not provide detox or CD treatment. Require a confirmed ride upon discharge.  [place] -- [facility]: POSTING 2 BEDS. Negative COVID. No medical devices.     Pt remains on waitlist pending appropriate placement availability.

## 2024-11-07 NOTE — TELEPHONE ENCOUNTER
[place]: [place]  [place] Inpatient Bed Call Log 11/7/24 @ 7:04 am:    Intake has called facilities that have not updated the bed status within the last 12 hours.                               [facility] is at capacity.            [facility] is posting 0 beds. [phone]. Per call at 7:05 am to Ila, they are at cap.    [facility] is posting 0 beds. Negative covid required.   [facility] is posting 0 beds. Neg covid. No high school/Krystal-psych. [phone];  Per call at 7:07 am to Rakesh, they are at cap but we can call back later as they expect d/c's.    [facility] is posting 0 beds. [phone]   [facility] is posting 0 beds. [phone]    [facility] is posting 1 bed. Negative covid. [phone]; Per call at 7:08 am to Melanie, they have no y/a beds avail- Pt declined on 11/4, 11/5 and 11/6   [facility] ([facility] is posting 0 beds [phone].     [facility] is posting 1 bed. LOW acuity. Ages 12+. Neg covid- [phone]   [facility] has 2 beds posted. No aggression. Negative Covid. Low acuity.   [facility] is posting 0 beds. Low acuity only. Neg covid.  [phone]    [facility] is posting 1 bed. Low acuity. No current aggression.     [facility] is posting 0 beds. Negative covid. [phone] ext [phone]; per call at 7:13 am, recording states that their adult unit at cap.    [facility] is posting 0 beds available. Negative covid.  [phone].       [facility] is posting 0 bed. Low acuity. 72 HH hold preferred. Krystal unit. Negative covid required. [phone]. Per call at 7:16 am to Leo, they have 1 bed avail.    [facility] is posting 0 beds. Low acuity only. Neg covid.  [phone]; per call at 7:17 am to Jyoti, they are at cap.      [facility] [place] is posting 4 beds.  Negative covid required.   Vol only, No history of aggression, violence, or  assault. No sexual offenders. No 72 HH holds. [phone]      [facility] is posting 1 bed. Negative covid required.  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 No aggressive or violent behavior or recent HX in the last 2 yrs. MH must be primary.) Always low acuity. [phone]    [facility] has 0 beds posted. Negative covid required.  Low acuity only. Violence and aggression capped.  [phone]; per call at 7:19 am to Tammy, they are at cap.    [facility] is posting 2 beds. Low acuity, Negative covid required. [phone]. Per call at 7:22 am to Autumn, she does not know their availability and said we can call back later.    [facility] posting 5 beds. Negative covid required.  [phone]; per Autumn's call to STEPHANI Larose around 6 am, they have 1 step-down/low acuity bed avail.  - Pt declined on 11/5 d/t acuity.  [facility] is posting 2 beds. Negative covid. LOW acuity. (No lines, drains, or tubes, oxygen, CPAP, IV, etc.) Must Have a Ride Home. [phone].    [facility] is posting 2 beds. Negative covid. (No. lines, drains, or tubes, oxygen, CPAP, IV, etc.) [phone].  Pt declined 11/6 d/t hx of aggression.       Pt remains on the work list pending appropriate bed availability.        11:31 AM Pagemaria a Brandt to review pt for admission to 6A.   12:30 PM 2nd page to Rikki.  12:33 PM Rikki reviewing and to consult with Naegele and will call back.  1:16 PM Rikki declined pt for admission to 6A at this time d/t pt and unit acuity.   1:19 PM Linda Arriaga to review decline to 6A.   1:53 PM Corina supports decline for 6A admisison.

## 2024-11-07 NOTE — PROGRESS NOTES
"Triage & Transition Services, Extended Care     Therapy Progress Note    Patient: Sadaf goes by \"Sadaf,\" uses she/her pronouns  Date of Service: November 7, 2024  Site of Service: [facility] EMERGENCY DEPARTMENT                             UREDH-D  Patient was seen yes  Mode of Assessment: Virtual: iPad    Presentation Summary: Pt is seen by extended care for therapeutic check-in and reassessment. Exchanged greeting, introduced self and role. Pt was observed to be able to participate in the assessment as evidenced by verbal consent. Pt initially was smiling and appeared to be in a good mood. She made a statement that her status is purple and she is going to be admitted. Writer stated, so far you have been declined for being admitted to mental health units. Writer attempted to discuss patient return to home with her mother. Pt replied with \"no.\" Pt then stated \"I am going to kill myself. I will jump off a bridge.\" Writer attempted to engage Pt in discussion about topics such as: safety planning, counseling/therapy, coping skills, , ICS apartment, and increasing PSS in home supports and services. Pt responded to all of those topics by saying \"no.\" Pt ended the video session by turning off the monitor.    Therapeutic Intervention(s) Provided: Engaged in guided discovery, explored patient's perspectives and helped expand them through socratic dialogue.    Current Symptoms: anxious hoplessness, sadness, thoughts of death/suicide, helplessness, negativistic, irritable anxious impulsive      Mental Status Exam   Affect: Dramatic  Appearance: Appropriate  Attention Span/Concentration: Attentive  Eye Contact: Engaged    Fund of Knowledge: Appropriate   Language /Speech Content: Fluent  Language /Speech Volume: Normal  Language /Speech Rate/Productions: Normal  Recent Memory: Intact  Remote Memory: Intact  Mood: Depressed  Orientation to Person: Yes   Orientation to Place: Yes  Orientation to Time of " "Day: Yes  Orientation to Date: Yes     Situation (Do they understand why they are here?): Yes  Psychomotor Behavior: Normal  Thought Content: Suicidal  Thought Form: Intact    Treatment Objective(s) Addressed: assessing safety, rapport building, building skills    Patient Response to Interventions: acceptance expressed, verbalizes understanding    Progress Towards Goals: Patient Reports Symptoms Are: improving  Patient Progress Toward Goals: is making progress  Comment: Pt remains in the ER with expectations that she will be admitted to a mental health unit. Pt reports she feels depressed and unable to engage in safety planning.  Next Step to Work Toward Discharge: patient ability to engage in safety planning  Ability to Engage Comment: Pt is unable to engage in safety planning. She continues to endorse suicidal ideation and depression. She has expectations of being admitted to a mental health unit or else she will kill herself. Writer spoke with patient's mother about safety planning and she indicated she does not feel safe with patient being home alone. She indicated they are not able to control the environment to make it safe.    Case Management: Case Management Included: collaborating with patient's support system  Details on Collaborating with Patient's Support System: Yarely Ross (Mother) [phone]    Summary of Interaction: Writer spoke with patient's mother (Yarely) today and notified her that patient continues to be declined for IPMH. Attempted to collaborate on safety plan for patient to return home. Yarely indicates they all work during the day and reports this to be risky for her. She says that Sadaf frequently has talked about hurting herself but to her knowledge, has never acted out on it. She says that when they are all gone this is when she \"lashes\" out. Writer asked her to elaborate on what lashing out meant. She describes that when Sadaf is home alone she gets bored, stressed, has more " "time to think, and lashes out. She replies again by saying that Sadaf starts feeling like she is in pain, things start hurting, she is not eating, not drinking enough, not taking enough medications, or she will take too many medications. She says \"If we are not around to help her through this, then she begins to spiral and will end up calling 911.\" She reports [place] and there have been so many 911 calls there from Sadaf that they could be at risk for being evicted from their home. She says,  \"Sadaf is like the boy who cried perri. At the same time you never know when she might try to act out on this and try to hurt herself.\" She says that they are coming up on the anniversary of her father's death in February. She says that Sadaf most recently, has been saying that she \"Doesn't want to be here anymore. She wants to go be with her dad.\"    Plan: inpatient mental health    yes provider Consulted with Dr. Saul Milner regarding disposition and planning. Alf requested this writer reach out to the patient's mother to determine if she would agree to safety planning process. After speaking to patient's mother, notified Alf that she is not able to manage or make the environment safer for this patient due to the family being at work during the day. Alf requested that we keep patient on the worklist. Writer placed another psychiatric order for the team to provide additional recommendation. Meanwhile recieved a message from the Patient Placement worker assigned to this case and notified this writer that patient has been declined for unit 6A.  no    Clinical Substantiation: Since the last session, this patient has been calm and cooperative in the ER. During rapport building stage of session, Pt was smiling and seemed to be in a good mood. Pt continues to request admission to [facility] treatment. She endorses feelings of sadness and depression. She endorses suicidal ideation. Pt replies she is " unable to engage in safety planning. Pt support system declines safety planning as well. Pt continues to be on the inpatient worklist.    Legal Status: Legal Status at Admission: Voluntary/Patient has signed consent for treatment  72 Hour Hold - Date/Time Initiated: 72hh removed  72 Hour Hold - Date/Time Ends: 72hh removed    Session Status: Time session started: 1101  Time session ended: 1105  Session Duration (minutes): 4 minutes  Session Number: 1  Anticipated number of sessions or this episode of care: 3  Date of most recent diagnostic assessment: 11/02/24    Time Spent: 4 minutes    CPT Code: CPT Codes: Non-Billable    Diagnosis:   Patient Active Problem List   Diagnosis Code    MENTAL HEALTH     Suicidal ideation R45.851    Suicidal ideations R45.851    Intellectual disability F79    Bipolar affective disorder, currently depressed, moderate (H) F31.32    Borderline personality disorder (H) F60.3    Morbid obesity (H) E66.01    Unspecified mood (affective) disorder (H) F39    Chronic constipation K59.09    History of suicide attempt Z91.51    Hyperhidrosis 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

## 2024-11-07 NOTE — CONSULTS
[facility] ED  Department of Psychiatry  Consultation note    Sadaf Ross MRN: [id number]   Age: 24 year old YOB: 2000     History     Chief Complaint   Patient presents with    Suicidal     Had a pocket knife and stated she was going slit her wrists.      HPI  Sadaf Ross is a 24 year old female with history of borderline pd, adhd, ptsd here with SI and a plan to cut her wrists. She came in 11/4. She has been waiting for an inpatient bed since and has been declined a number of times, most recently by 6A today due to unit acuity. DEC/EC contacted patient's mother regarding safety planning. Patient's mother reported that they cannot make the environment safe for patient at this time.     Spoke with patient about being in the ED for an extended period of time. She says she has been in communication with her mom and her mom wants her to stay therefore she wants to stay. She has been getting medications here, she had been off of them for a few days prior to admission. Patient was put on a 72 hour hold on 11/4, was dysregulated and trying to leave at the time. That hold was removed yesterday. She says she wants to be admitted inpatient because her mom feels she should be. She also says she wants to go soon so she can  her new glasses. When asked about SI she evaded the subject, asked several times but she did not answer. She has been medication compliant here.     Past Medical History  Past Medical History:   Diagnosis Date    ADHD (attention deficit hyperactivity disorder)     Bipolar 1 disorder (H)     Borderline personality disorder (H)     Depressive disorder     Intellectual disability     Obesity     Syncope      Past Surgical History:   Procedure Laterality Date    APPENDECTOMY       acetaminophen (TYLENOL) 325 MG tablet  albuterol (PROAIR HFA/PROVENTIL HFA/VENTOLIN HFA) 108 (90 Base) MCG/ACT inhaler  ARIPiprazole lauroxil ER (ARISTADA)  882 MG/3.2ML intra-muscular  benztropine (COGENTIN) 1 MG tablet  cetirizine (ZYRTEC) 10 MG tablet  cloNIDine (CATAPRES) 0.1 MG tablet  clotrimazole (LOTRIMIN) 1 % external cream  dicyclomine (BENTYL) 20 MG tablet  divalproex sodium extended-release (DEPAKOTE ER) 250 MG 24 hr tablet  docusate sodium (COLACE) 50 MG capsule  famotidine (PEPCID) 20 MG tablet  FLUoxetine (PROZAC) 40 MG capsule  hydrOXYzine (ATARAX) 50 MG tablet  lactase (LACTAID) 3000 UNIT tablet  multivitamin w/minerals (MULTI-VITAMIN) tablet  OLANZapine zydis (ZYPREXA) 5 MG ODT  ondansetron (ZOFRAN) 8 MG tablet  pantoprazole (PROTONIX) 40 MG EC tablet  polyethylene glycol (MIRALAX) 17 GM/Dose powder  promethazine (PHENERGAN) 12.5 MG tablet  senna-docusate (SENOKOT-S/PERICOLACE) 8.6-50 MG tablet  sodium chloride 0.65 % nasal spray  traZODone (DESYREL) 100 MG tablet  Vitamin D, Cholecalciferol, 25 MCG (1000 UT) TABS  levothyroxine (SYNTHROID/LEVOTHROID) 25 MCG tablet      Allergies   Allergen Reactions    Penicillins Rash and Unknown     Family History  Family History   Problem Relation Age of Onset    Diabetes Type 1 Father     Cancer Paternal Grandfather      Social History   Social History     Tobacco Use    Smoking status: Former     Current packs/day: 0.25     Average packs/day: 0.3 packs/day for 5.0 years (1.3 ttl pk-yrs)     Types: Cigarettes, Vaping Device     Passive exposure: Past    Smokeless tobacco: Never   Substance Use Topics    Alcohol use: No    Drug use: Never          Review of Systems  A medically appropriate review of systems was performed with pertinent positives and negatives noted in the HPI, and all other systems negative.    Physical Examination   BP: 131/78  Pulse: 111  Temp: 97.6  F (36.4  C)  Resp: 16  SpO2: 99 %    Physical Exam  General: Appears stated age.   Neuro: Alert and fully oriented. Extremities appear to demonstrate normal strength on visual inspection.   Integumentary/Skin: no rash visualized, normal  color    Psychiatric Examination   Appearance: awake, alert, adequately groomed, and dressed in hospital scrubs  Attitude:  cooperative  Eye Contact:  good  Mood:  good  Affect:  mood congruent  Speech:  clear, coherent  Psychomotor Behavior:  no evidence of tardive dyskinesia, dystonia, or tics  Thought Process:  goal oriented  Associations:  no loose associations  Thought Content:   didn't answer re: SI, denies HI/AVH  Insight:  limited  Judgement:  limited  Oriented to:  time, person, and place  Attention Span and Concentration:  fair  Recent and Remote Memory:  intact  Language: able to name/identify objects without impairment  Fund of Knowledge: intact with awareness of current and past events    ED Course        Labs Ordered and Resulted from Time of ED Arrival to Time of ED Departure   COMPREHENSIVE METABOLIC PANEL - Abnormal       Result Value    Sodium 139      Potassium 4.0      Carbon Dioxide (CO2) 21 (*)     Anion Gap 11      Urea Nitrogen 11.5      Creatinine 0.76      GFR Estimate >90      Calcium 9.7      Chloride 107      Glucose 93      Alkaline Phosphatase 59      AST 12      ALT 13      Protein Total 7.6      Albumin 4.6      Bilirubin Total 0.2     ROUTINE UA WITH MICROSCOPIC REFLEX TO CULTURE - Abnormal    Color Urine Yellow      Appearance Urine Slightly Cloudy (*)     Glucose Urine Negative      Bilirubin Urine Negative      Ketones Urine Negative      Specific Gravity Urine >1.030      Blood Urine Negative      pH Urine 6.0      Protein Albumin Urine 30 (*)     Urobilinogen Urine 0.2      Nitrite Urine Negative      Leukocyte Esterase Urine Trace (*)     Mucus Urine Present (*)     RBC Urine 5 (*)     WBC Urine 9 (*)     Squamous Epithelials Urine 32 (*)    LIPASE - Normal    Lipase 29     HCG QUALITATIVE URINE - Normal    hCG Urine Qualitative Negative     CBC WITH PLATELETS AND DIFFERENTIAL    WBC Count 8.1      RBC Count 4.47      Hemoglobin 12.6      Hematocrit 35.9      MCV 80      MCH  28.2      MCHC 35.1      RDW 13.0      Platelet Count 238      % Neutrophils 69      % Lymphocytes 25      % Monocytes 5      % Eosinophils 1      % Basophils 1      % Immature Granulocytes 0      NRBCs per 100 WBC 0      Absolute Neutrophils 5.6      Absolute Lymphocytes 2.0      Absolute Monocytes 0.4      Absolute Eosinophils 0.0      Absolute Basophils 0.1      Absolute Immature Granulocytes 0.0      Absolute NRBCs 0.0         Assessments & Plan (with Medical Decision Making)   Patient presenting with SI with plan to cut herself. She was off medications for a few days prior to admission. She has been back on those in the ED. Today she presented as euthymic and future oriented though she did not answer re: SI. Patient's mother reported that patient's home environment cannot be made safer. Patient remains on the inpatient list though she has been repeatedly declined. It does seem as though patient has been stabilizing while taking medications here for about 3 days now. Safety planning with mom seems to be the sticking point at this time.     I have reviewed the assessment completed by the [facility].     Preliminary diagnosis:    ICD-10-CM    1. Suicidal ideation  R45.851       2. Borderline personality disorder     3.      PTSD        Treatment Plan:  -Patient remains on the inpatient list.  -If she continues to stabilize and safety planning can occur with mom would be appropriate for discharge    --  Judy Goodson MD   [facility] EMERGENCY DEPARTMENT

## 2024-11-08 ENCOUNTER — TELEPHONE (OUTPATIENT)
Dept: BEHAVIORAL HEALTH | Facility: CLINIC | Age: 24
End: 2024-11-08
Payer: MEDICARE

## 2024-11-08 PROBLEM — R45.851: Status: ACTIVE | Noted: 2024-11-08

## 2024-11-08 PROCEDURE — 250N000013 HC RX MED GY IP 250 OP 250 PS 637: Performed by: FAMILY MEDICINE

## 2024-11-08 PROCEDURE — 250N000013 HC RX MED GY IP 250 OP 250 PS 637: Performed by: EMERGENCY MEDICINE

## 2024-11-08 PROCEDURE — 250N000013 HC RX MED GY IP 250 OP 250 PS 637: Performed by: CLINICAL NURSE SPECIALIST

## 2024-11-08 PROCEDURE — 128N000002 HC R&B CD/MH ADOLESCENT

## 2024-11-08 RX ORDER — ACETAMINOPHEN 325 MG/1
650 TABLET ORAL EVERY 4 HOURS PRN
Status: DISCONTINUED | OUTPATIENT
Start: 2024-11-08 | End: 2024-11-08

## 2024-11-08 RX ORDER — HYDROXYZINE HYDROCHLORIDE 25 MG/1
25 TABLET, FILM COATED ORAL EVERY 4 HOURS PRN
Status: DISCONTINUED | OUTPATIENT
Start: 2024-11-08 | End: 2024-11-12

## 2024-11-08 RX ORDER — PANTOPRAZOLE SODIUM 20 MG/1
40 TABLET, DELAYED RELEASE ORAL
Status: DISCONTINUED | OUTPATIENT
Start: 2024-11-09 | End: 2024-11-08

## 2024-11-08 RX ORDER — FLUOXETINE 40 MG/1
80 CAPSULE ORAL DAILY
Status: DISCONTINUED | OUTPATIENT
Start: 2024-11-09 | End: 2024-11-08

## 2024-11-08 RX ORDER — MAGNESIUM HYDROXIDE/ALUMINUM HYDROXICE/SIMETHICONE 120; 1200; 1200 MG/30ML; MG/30ML; MG/30ML
30 SUSPENSION ORAL EVERY 4 HOURS PRN
Status: DISCONTINUED | OUTPATIENT
Start: 2024-11-08 | End: 2024-11-22 | Stop reason: HOSPADM

## 2024-11-08 RX ORDER — FAMOTIDINE 20 MG/1
20 TABLET, FILM COATED ORAL AT BEDTIME
Status: DISCONTINUED | OUTPATIENT
Start: 2024-11-08 | End: 2024-11-08

## 2024-11-08 RX ORDER — OLANZAPINE 10 MG/1
10 TABLET ORAL 3 TIMES DAILY PRN
Status: DISCONTINUED | OUTPATIENT
Start: 2024-11-08 | End: 2024-11-12

## 2024-11-08 RX ORDER — TRAZODONE HYDROCHLORIDE 100 MG/1
100 TABLET ORAL AT BEDTIME
Status: DISCONTINUED | OUTPATIENT
Start: 2024-11-08 | End: 2024-11-08

## 2024-11-08 RX ORDER — DIVALPROEX SODIUM 250 MG/1
250 TABLET, FILM COATED, EXTENDED RELEASE ORAL 2 TIMES DAILY
Status: DISCONTINUED | OUTPATIENT
Start: 2024-11-08 | End: 2024-11-08

## 2024-11-08 RX ORDER — LEVOTHYROXINE SODIUM 25 UG/1
25 TABLET ORAL
Status: DISCONTINUED | OUTPATIENT
Start: 2024-11-09 | End: 2024-11-08

## 2024-11-08 RX ORDER — CETIRIZINE HYDROCHLORIDE 10 MG/1
10 TABLET ORAL DAILY
Status: DISCONTINUED | OUTPATIENT
Start: 2024-11-09 | End: 2024-11-08

## 2024-11-08 RX ORDER — CLONIDINE HYDROCHLORIDE 0.1 MG/1
0.1 TABLET ORAL AT BEDTIME
Status: DISCONTINUED | OUTPATIENT
Start: 2024-11-08 | End: 2024-11-08

## 2024-11-08 RX ORDER — AMOXICILLIN 250 MG
1 CAPSULE ORAL 2 TIMES DAILY PRN
Status: DISCONTINUED | OUTPATIENT
Start: 2024-11-08 | End: 2024-11-22 | Stop reason: HOSPADM

## 2024-11-08 RX ORDER — VITAMIN B COMPLEX
25 TABLET ORAL DAILY
Status: DISCONTINUED | OUTPATIENT
Start: 2024-11-09 | End: 2024-11-08

## 2024-11-08 RX ORDER — OLANZAPINE 10 MG/2ML
10 INJECTION, POWDER, FOR SOLUTION INTRAMUSCULAR 3 TIMES DAILY PRN
Status: DISCONTINUED | OUTPATIENT
Start: 2024-11-08 | End: 2024-11-12

## 2024-11-08 RX ORDER — DICYCLOMINE HCL 20 MG
20 TABLET ORAL 2 TIMES DAILY WITH MEALS
Status: DISCONTINUED | OUTPATIENT
Start: 2024-11-08 | End: 2024-11-08

## 2024-11-08 RX ORDER — DOCUSATE SODIUM 100 MG/1
100 CAPSULE, LIQUID FILLED ORAL 2 TIMES DAILY
Status: DISCONTINUED | OUTPATIENT
Start: 2024-11-08 | End: 2024-11-08

## 2024-11-08 RX ORDER — MULTIVITAMIN,THERAPEUTIC
1 TABLET ORAL DAILY
Status: DISCONTINUED | OUTPATIENT
Start: 2024-11-09 | End: 2024-11-08

## 2024-11-08 RX ADMIN — FLUOXETINE HYDROCHLORIDE 80 MG: 40 CAPSULE ORAL at 08:56

## 2024-11-08 RX ADMIN — DOCUSATE SODIUM 100 MG: 100 CAPSULE, LIQUID FILLED ORAL at 08:56

## 2024-11-08 RX ADMIN — CLONIDINE HYDROCHLORIDE 0.1 MG: 0.1 TABLET ORAL at 22:10

## 2024-11-08 RX ADMIN — PANTOPRAZOLE SODIUM 40 MG: 20 TABLET, DELAYED RELEASE ORAL at 08:56

## 2024-11-08 RX ADMIN — Medication 1 TABLET: at 08:56

## 2024-11-08 RX ADMIN — DIVALPROEX SODIUM 250 MG: 250 TABLET, FILM COATED, EXTENDED RELEASE ORAL at 22:10

## 2024-11-08 RX ADMIN — DICYCLOMINE HYDROCHLORIDE 20 MG: 20 TABLET ORAL at 22:55

## 2024-11-08 RX ADMIN — FAMOTIDINE 20 MG: 20 TABLET ORAL at 22:10

## 2024-11-08 RX ADMIN — Medication 25 MCG: at 08:56

## 2024-11-08 RX ADMIN — TRAZODONE HYDROCHLORIDE 100 MG: 100 TABLET ORAL at 22:10

## 2024-11-08 RX ADMIN — DICYCLOMINE HYDROCHLORIDE 20 MG: 20 TABLET ORAL at 08:56

## 2024-11-08 RX ADMIN — DIVALPROEX SODIUM 250 MG: 250 TABLET, FILM COATED, EXTENDED RELEASE ORAL at 08:56

## 2024-11-08 RX ADMIN — CETIRIZINE HYDROCHLORIDE 10 MG: 10 TABLET, FILM COATED ORAL at 08:55

## 2024-11-08 RX ADMIN — DOCUSATE SODIUM 100 MG: 100 CAPSULE, LIQUID FILLED ORAL at 22:55

## 2024-11-08 RX ADMIN — LEVOTHYROXINE SODIUM 25 MCG: 25 TABLET ORAL at 08:55

## 2024-11-08 RX ADMIN — MICONAZOLE NITRATE: 20 POWDER TOPICAL at 22:56

## 2024-11-08 NOTE — ED NOTES
[facility] ED Mental Health Handoff Note:       Brief HPI:  This is a 24 year old female signed out to me by Dr. Manriquez.  See initial ED Provider note for full details of the presentation.  No events on my shift.    Home meds reviewed and ordered/administered: Yes    Medically stable for inpatient mental health admission: Yes.    Evaluated by mental health: Yes. The recommendation is for inpatient mental health treatment. Bed search in process    Safety concerns: At the time I received sign out, there were no safety concerns.    Hold Status:  Active Orders   N/A         Exam:   Patient Vitals for the past 24 hrs:   BP Temp Temp src Pulse Resp SpO2   11/08/24 0909 108/72 97.3  F (36.3  C) Oral 74 18 99 %       General: Resting calmly in the emergency department.    ED Course:    Medications   ondansetron (ZOFRAN) injection 4 mg (4 mg Intravenous $Given 11/4/24 1307)   acetaminophen (TYLENOL) tablet 650 mg (650 mg Oral $Given 11/7/24 1602)   albuterol (PROVENTIL HFA/VENTOLIN HFA) inhaler (has no administration in time range)   cetirizine (zyrTEC) tablet 10 mg (10 mg Oral $Given 11/8/24 0855)   dicyclomine (BENTYL) tablet 20 mg (20 mg Oral $Given 11/8/24 0856)   divalproex sodium extended-release (DEPAKOTE ER) 24 hr tablet 250 mg (250 mg Oral $Given 11/8/24 0856)   docusate sodium (COLACE) capsule 100 mg (100 mg Oral $Given 11/8/24 0856)   famotidine (PEPCID) tablet 20 mg (20 mg Oral $Given 11/7/24 2059)   FLUoxetine (PROzac) capsule 80 mg (80 mg Oral $Given 11/8/24 0856)   hydrOXYzine HCl (ATARAX) tablet 50 mg (50 mg Oral $Given 11/7/24 1621)   lactase (LACTAID) tablet 3,000 Units (has no administration in time range)   levothyroxine (SYNTHROID/LEVOTHROID) tablet 25 mcg (25 mcg Oral $Given 11/8/24 0855)   multivitamin w/minerals (THERA-VIT-M) tablet 1 tablet (1 tablet Oral $Given 11/8/24 0856)   ondansetron (ZOFRAN) tablet 8 mg (has no administration in time range)   pantoprazole (PROTONIX) EC tablet 40 mg (40  mg Oral $Given 11/8/24 0856)   polyethylene glycol (MIRALAX) powder 17 g (has no administration in time range)   traZODone (DESYREL) tablet 100 mg (100 mg Oral $Given 11/7/24 2119)   Vitamin D3 (CHOLECALCIFEROL) tablet 25 mcg (25 mcg Oral $Given 11/8/24 0856)   cloNIDine (CATAPRES) tablet 0.1 mg (0.1 mg Oral $Given 11/7/24 2059)   benztropine (COGENTIN) tablet 1 mg (1 mg Oral $Given 11/7/24 2059)   miconazole (MICATIN) 2 % powder ( Topical Not Given 11/8/24 1300)   sodium chloride 0.9% BOLUS 1,000 mL (0 mLs Intravenous Stopped 11/4/24 1344)   OLANZapine zydis (zyPREXA) ODT tab 10 mg (10 mg Oral $Given 11/4/24 1454)   OLANZapine zydis (zyPREXA) ODT tab 5 mg (5 mg Oral Not Given 11/6/24 0404)   OLANZapine (zyPREXA) injection 10 mg (10 mg Intramuscular Not Given 11/6/24 0807)            There were no significant events during my shift.    Patient was signed out to the oncoming provider, Dr. Hunt      Impression:    ICD-10-CM    1. Suicidal ideation  R45.851       2. Has access to planned means of suicide  R45.851           Plan:    Awaiting inpatient mental health admission/transfer.      RESULTS:   No results found for this visit on 11/04/24 (from the past 24 hours).          MD Karishma Hooper, Bernardino Cordova MD  11/08/24 6256

## 2024-11-08 NOTE — TELEPHONE ENCOUNTER
[place]: [place]  Access Inpatient [place] Call Log  11/8/2024 12:10 AM  Intake has called facilities that have not updated their bed status within the last 12 hours.??      ADULTS:     *[facility] -- [facility]: @ Cap per website.  [place] -- [facility]:  @ Cap per website.    [place] -- [facility]: @ Cap per website.   [place] -- [facility]: @ Cap per website.  -12:15 AM Per Melisa, they have low acuity.   [place] -- [facility]: @ Cap per website.  [facility] -- [facility]: @ Cap per website.    [place] -- [facility]/YA beds @ Posting 1 beds. Ages 18-35, Voluntary only, COVID test req'd, NO aggression, physical or sexual assault, violence hx or drug abuse, or psychosis - 12:16 AM Per Catrina, YA: 0, Adol: 1, Child: 0. - Declined due to acuity  [place] -- [facility]: @ Cap per website.  [place] -- [facility]: @ cap per website.  [place] -- [facility]:  @ Cap per website.      *[facility]: @ Cap per website. Mixed unit/Low acuity only.   [facility]: @ Posting 1 bed. Low acuity, No aggression    [facility]: @ cap per website. -Not appropriate due to pt acuity  [facility]:  @ Posting 1 bed. Low acuity only. No current aggression. -Not appropriate due to pt acuity  [facility]:  @ cap per website. COVID negative test req. Lower acuity only. No Aggression. - 12:19 AM Per Angel Malloryato: 0, Roch: 0, Jack Meneses: can review low acuity.   [facility]: @ Cap per website. Low acuity only. Prefer med adjustments placement.  . No aggression - 12:19 AM Per Saul: [place]: 0, Roch: 0, [facility] Meneses: can review low acuity.   [place] [facility]:  @ Posting 2 beds COVID negative test req. Lower acuity only. No Aggression. - 12:19 AM Per Saul: [place]: 0, Roch: 0, Jack Meneses: can review low acuity. -Not appropriate due to pt acuity  [place] -- [facility]: @ Cap per website. NO reviews after 10PM. Low acuity only.    [place] -- [facility]: @ [facility] per website. No hx of  aggression. No sexual offenders. Voluntary patients only   [place] -- [facility]:  @ Cap per website. Low acuity only. Must have the cognitive ability to do programming. No aggressive or violent behavior or recent HX in the last 2 yrs. MH must be primary.    [place] -- [facility]: @ Cap per website. COVID negative test. Must be low acuity ONLY.   [place] -- [facility]: @ Cap per website.  Low acuity. Negative Covid. -12:24 AM Per Autumn, they are capped.   [place] -- [facility]: @Posting 5 beds. Negative COVID test   [place] -[facility]: @ Posting 4 beds. No hx of aggression/assault. No lines, drains or tubes. Does not provide detox or CD treatment.  -Not appropriate due to pt acuity  [place] -- [facility]: @ Cap per website. Mixed unit/Low acuity/no medical devices - IV, CPAP etc. Negative Covid.). - Declined due to acuity                                                                                                                            Pt remains on waitlist pending appropriate placement availability.

## 2024-11-08 NOTE — PROGRESS NOTES
"Triage & Transition Services, Extended Care       Patient: Sadaf Ross goes by \"Sadaf,\" uses she/her pronouns  Date of Service: November 8, 2024  Site of Service: [facility] EMERGENCY DEPARTMENT                             UREDH-D  Patient was seen yes In person  Mode of Assessment: In person    Patient is followed related to: long wait time for admission  Case Management included: Case Management Included: skills work  Details on skills work: goal planning, add structure to day, positive coping skills  Summary of Interaction: 2:20-2:43pm Introduced self to patient and taught her Spot It game and built rapport. Reviewed [employer] membership benefits and prompted patient to identify her skills she can share with the [employer]. Patient identified being good at cooking and shared what she's good at cooking. Patient was easily redirectable when she started talking about complaints about group home residents and staff. Writer reinforced coming to [facility] during the day to get away from these stressors. Writer showed patient [employer] flyer, video, and website and she chose to complete referral.    Recommendations: Final Disposition / Recommended Care Path: Per [facility], recommended for inpatient mental health    GREG Myers   Extended Care Coordinator   [phone]   "

## 2024-11-08 NOTE — ED NOTES
Pt informed of admitting to hospital and offered BEC. Pt declined and was informed that it is unknown when she would be able to leave the emergency room for an inpatient bed to open up.

## 2024-11-08 NOTE — DISCHARGE INSTRUCTIONS
[facility] offer a supportive community environment to meet people and get peer support, participate in recreational activities, and engage in volunteer work. They are located in [facility], and [place]. To learn more, go to [email or web address] or call or email their  at [phone] and [email or web address] to schedule a tour.          providers. Call at least 24 hours in advance if you need to reschedule an appointment to ensure continued access to your outpatient providers.     Major Treatments, Procedures and Findings:  You were provided with: a psychiatric assessment, assessed for medical stability, medication evaluation and/or management, group therapy, individual therapy, and milieu management    Symptoms to Report: increased confusion, losing more sleep, mood getting worse, or thoughts of suicide    Early warning signs can include: increased depression or anxiety sleep disturbances increased thoughts or behaviors of suicide or self-harm     Safety and Wellness:  Take all medicines as directed.  Make no changes unless your doctor suggests them.      Follow treatment recommendations.  Refrain from alcohol and non-prescribed drugs.  If there is a concern for safety, call 911.    Resources:   Mental Health Crisis Resources  Throughout [place]: call **CRISIS (**240273)  Crisis Text Line: is available for free, 24/7 by texting [place] to 699882  Suicide Awareness Voices of Education (SAVE) (www.save.org): 761-592-GXTT (7257)  The National Suicide Prevention Lifeline is now: 988 Suicide and Crisis Lifeline. Call 988 anytime.  National [place]pton on Mental Illness (www.[place].celine.org): 142-519-1235 or 181-617-8052.  Wbcg0phxc: text the word LIFE to 61586 for immediate support and crisis intervention  Mental Health Consumer/Survivor Network of [place] (www.mhcsn.net): 817.221.4984 or 250-214-8870  Mental Health Association of [place] (www.mentalhealth.org): 333.681.2769 or 048-402-2759  Peer Support Connection [place] Warmline ([facility]) 1-781.848.8662 Available from 5pm - 9am (7 days a week/365 days a year)  Call or Text 988 or [facility] 1-800.123.7489 [facility] Mental Crisis Program      General Medication Instructions:   See your medication sheet(s) for instructions.   Take all medicines as directed.  Make no changes unless your doctor suggests them.   Go to all  your doctor visits.  Be sure to have all your required lab tests. This way, your medicines can be refilled on time.  Do not use any drugs not prescribed by your doctor.  Avoid alcohol.    Advance Directives:   Scanned document on file with [employer]? No scanned doc  Is document scanned? Pt states no documents  Honoring Choices Your Rights Handout: Informed and given  Was more information offered? Pt declined    The Treatment team has appreciated the opportunity to work with you. If you have any questions or concerns about your recent admission, you can contact the unit which can receive your call 24 hours a day, 7 days a week. They will be able to get in touch with a Provider if needed. The unit number is [phone] .

## 2024-11-08 NOTE — TELEPHONE ENCOUNTER
R: [facility] ... ACCEPTED TO UNIT 6A/ NAEGELE & YARUSSO  8:56a Called Unit 30 ISAMAR Rodas to review pt d/t case by case status on the unit. Awaiting response.  11:34a Received call from Unit 30 ISAMAR Rodas informing pt is too acute for the unit at this time.   Naegele, Debra A 1:29 PM   MRN [id number] accepted to [facility] Intake to update work lists.   1:35p Indicia Completed.  1:36p Called Unit 6A ISAMAR PENN unavailable and to [facility] Intake.   1:46p Called Unit 6A ISAMAR Hollins to inform pt is in queue for the unit. CRN informed Unit will call ED for report around 4:00pm as they need additional staff to admit pt.  1:48p Called [facility] ED JOSE Stacy to inform pt is in queue for 6A/Naegele & Yarusso. 6A CRN informed Unit will call ED for report around 4:00pm as they need additional staff to admit pt.  1:49p  Intake notes all work lists updated with pt's acceptance.   [facility] Access Inpatient Bed Call Log 11/8/2024 8:23:59 AM  Intake has called facilities that have not updated the bed status within the last 12 hours.         (Adults);        [place]:                [facility] is posting 0 beds.             [facility] is posting 0 beds. [phone]: 8:27AM AT [facility] is posting 0 beds. Negative covid required.   [facility] is posting 0 beds. Neg covid. No high school/Krystal-psych. [phone] 8:27AM CB AFTER 9:30 AM.  [facility] is posting 0 beds. [phone]   [facility] is posting 0 bed. [phone]    [facility] is posting 2 beds. (Ages 18-35) Negative covid. [phone]  [facility] is posting 0 beds.    [facility] is posting 0 beds [phone]       [facility]:  [facility] is posting 0 beds. LOW acuity ONLY. Mixed unit 12+. Negative covid- [phone]   [facility] has 1 beds posted. No aggression. Negative Covid. Low acuity.   [facility] is posting 0 beds. Low acuity only. Neg covid.  [phone]    [facility] is posting 1 beds. Low  acuity. No current aggression.     [facility] is posting 0 beds. Negative covid. [phone].  8:26 AM NO ANSWER  [facility] is posting 0 beds available. Negative covid.  [phone].       [facility] is posting 0 beds. Low acuity. 72 HH hold preferred. Negative covid required. [phone]. 8:28AM AT [facility] ([place] Meneses) is posting 3 beds. Low acuity only. Neg covid.  [phone].   [facility] in [place] is posting 0 beds.  Negative covid required.   Vol only, No history of aggression, violence, or assault. No sexual offenders. No 72 HH holds. [phone]        [facility] is posting 0 beds. Negative covid required.  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 No aggressive or violent behavior or recent HX in the last 2 yrs. MH must be primary.) Always low acuity. [phone]    [facility] has 0 beds posted. Negative covid required.  Low acuity only. Violence and aggression capped.  [phone]  [facility] is posting 2 beds. Low acuity, Negative covid required. [phone] PER TARIQ, NO CURRENT BED AVAILABILITY BUT CAN ADD PT'S TO WAIT LIST.   [facility] posting 5 beds Negative covid required.  [phone].   [facility] [place] is posting 3 beds. Negative covid. LOW acuity. (No lines, drains, or tubes, oxygen, CPAP, IV, etc.) Must Have a Ride Home. [phone]  [facility] is posting 0 beds. Negative covid. (No. lines, drains, or tubes, oxygen, CPAP, IV, etc.) [phone]    [facility] is posting 20 beds. No covid test required. OUT OF STATE. [phone] 8:24 AM PER BENITA, PEDS 15 AND ADULTS 20.       Pt remains on the work list pending appropriate bed availability.

## 2024-11-09 PROCEDURE — 128N000002 HC R&B CD/MH ADOLESCENT

## 2024-11-09 PROCEDURE — 250N000013 HC RX MED GY IP 250 OP 250 PS 637: Performed by: CLINICAL NURSE SPECIALIST

## 2024-11-09 PROCEDURE — 250N000013 HC RX MED GY IP 250 OP 250 PS 637: Performed by: EMERGENCY MEDICINE

## 2024-11-09 PROCEDURE — 99223 1ST HOSP IP/OBS HIGH 75: CPT | Performed by: NURSE PRACTITIONER

## 2024-11-09 RX ADMIN — HYDROXYZINE HYDROCHLORIDE 25 MG: 25 TABLET, FILM COATED ORAL at 13:39

## 2024-11-09 RX ADMIN — DICYCLOMINE HYDROCHLORIDE 20 MG: 20 TABLET ORAL at 08:08

## 2024-11-09 RX ADMIN — DOCUSATE SODIUM 100 MG: 100 CAPSULE, LIQUID FILLED ORAL at 08:03

## 2024-11-09 RX ADMIN — DOCUSATE SODIUM 100 MG: 100 CAPSULE, LIQUID FILLED ORAL at 19:53

## 2024-11-09 RX ADMIN — DIVALPROEX SODIUM 250 MG: 250 TABLET, FILM COATED, EXTENDED RELEASE ORAL at 19:54

## 2024-11-09 RX ADMIN — MICONAZOLE NITRATE: 20 POWDER TOPICAL at 19:53

## 2024-11-09 RX ADMIN — LEVOTHYROXINE SODIUM 25 MCG: 25 TABLET ORAL at 09:50

## 2024-11-09 RX ADMIN — MICONAZOLE NITRATE: 20 POWDER TOPICAL at 08:04

## 2024-11-09 RX ADMIN — Medication 25 MCG: at 08:04

## 2024-11-09 RX ADMIN — OLANZAPINE 10 MG: 10 TABLET, FILM COATED ORAL at 13:39

## 2024-11-09 RX ADMIN — FLUOXETINE HYDROCHLORIDE 80 MG: 40 CAPSULE ORAL at 08:20

## 2024-11-09 RX ADMIN — TRAZODONE HYDROCHLORIDE 100 MG: 100 TABLET ORAL at 20:00

## 2024-11-09 RX ADMIN — DIVALPROEX SODIUM 250 MG: 250 TABLET, FILM COATED, EXTENDED RELEASE ORAL at 08:04

## 2024-11-09 RX ADMIN — PANTOPRAZOLE SODIUM 40 MG: 20 TABLET, DELAYED RELEASE ORAL at 08:04

## 2024-11-09 RX ADMIN — Medication 1 TABLET: at 08:03

## 2024-11-09 RX ADMIN — DICYCLOMINE HYDROCHLORIDE 20 MG: 20 TABLET ORAL at 19:53

## 2024-11-09 RX ADMIN — CLONIDINE HYDROCHLORIDE 0.1 MG: 0.1 TABLET ORAL at 20:00

## 2024-11-09 RX ADMIN — CETIRIZINE HYDROCHLORIDE 10 MG: 10 TABLET, FILM COATED ORAL at 08:03

## 2024-11-09 RX ADMIN — FAMOTIDINE 20 MG: 20 TABLET ORAL at 20:00

## 2024-11-09 NOTE — PLAN OF CARE
"  Problem: Suicide Risk  Goal: Absence of Self-Harm  Outcome: Not Progressing     Problem: Depressive Symptoms  Goal: Depressive Symptoms  Description: Signs and symptoms of listed problems will be absent or manageable.  Outcome: Not Progressing     Problem: Anxiety Signs/Symptoms  Goal: Optimized Energy Level (Anxiety Signs/Symptoms)  Outcome: Not Progressing     Problem: Adult Inpatient Plan of Care  Goal: Readiness for Transition of Care  Intervention: Mutually Develop Transition Plan  Recent Flowsheet Documentation  Taken 11/8/2024 2100 by Angela Espinoza RN  Equipment Currently Used at Home: none     Problem: Adult Behavioral Health Plan of Care  Goal: Plan of Care Review  Recent Flowsheet Documentation  Taken 11/8/2024 2128 by Angela Espinoza, RN  Plan of Care Reviewed With: patient   Goal Outcome Evaluation:      Plan of Care Reviewed With: patient  Pt admitted from the ED at around 2045, pt is voluntary, has history of bipolar disorder, borderline personality disorder, ADHD with prior care plan for frequent ED visits who presents with SI, worsening depression, the pt has a plan to cut herself. Pt was formally in group home that refused to take her back because of SI and SIB. Pt still reports SI with a plan to cut,SIB, depression - 9/10, anxiety that she describes as  \"bad\", pt denies hallucinations and pain. Pt has scratch marks on bilateral arms with no s/s of infection that she reported that they are from SIB at the ED. The pt did not contract for safety. Pt was asking how long she will be admitted on 6A, she then said that her mom wants her to stay inpatient long. As per the patient her mom wants her to stay inpatient for a long time till she feels safe enough to go home. Calm and co-operative with admission search,pt oriented to the unit. Pt had a shower as per her request.     "

## 2024-11-09 NOTE — PLAN OF CARE
Problem: Suicide Risk  Goal: Absence of Self-Harm  Outcome: Not Progressing   Goal Outcome Evaluation:       At 1300, As of this moment pt is on a 1:1 for safety and has agreed to stay safe while here but pt is quite impulsive and picks at her skin.   She is following directives but will remain on a SIO for safety.  She is listening to music, pacing but refuses to go to the music group today.  She admits to liking the attention from her 1:1 but does not interact with anyone else of her peers.  Thru the evening, her behaviors have been fairly good. She has SIB thoughts with some skin picking but nothing to out of ordinary for this pt. Some head banging but again is following directives.  She has taken all her meds this evening and has settled well in her room at this time of 2150.   She remains a 1:1 SIO for safety.  Will assess behaviors.

## 2024-11-09 NOTE — ED NOTES
Report given to José Miguel RN to assume pt care on 6A.     Bernice England RN on 11/8/2024 at 7:59 PM
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
## 2024-11-09 NOTE — PROGRESS NOTES
11/08/24 2320   Patient Belongings   Belongings Search Yes   Clothing Search Yes   Second Staff yulisa     ..A               Belongings brought in 11/8  2 packs of bath cloths, red shorts, blue shorts, black tank, jeans, red mesh shirt, black socks, 3 cell phones, red underwear, jacket, gray bra, black jacket, black shoes, black back pack (in backpack  2 Flashlight, 6 pens, mint tin, vape green, battery pack, 2 car  ports, 2 pairs sunglasses, 6 bracelets, silver ring, rolls of mints, 1.27 in change, red cable, medical results, , masks, orange handtowel, silver vape, pink vape x 2, amie drink, red vape, tissues,toothbrush, hat, sandwich container, deoderant, 5 bottles of water, purple gloves, bag of chapstick and ear buds, pack of mints, lotion, tic tacs, cough drops, cards, handsanitzer x 3, key chain, charge block, cable, pop it bracelet, tylenol, pepto, ibprofin.        Admission:  I am responsible for any personal items that are not sent to the safe or pharmacy.  [facility] is not responsible for loss, theft or damage of any property in my possession.    Signature:  _________________________________ Date: _______  Time: _____                                              Staff Signature:  ____________________________ Date: ________  Time: _____      2nd Staff person, if patient is unable/unwilling to sign:    Signature: ________________________________ Date: ________  Time: _____     Discharge:  [facility] has returned all of my personal belongings:    Signature: _________________________________ Date: ________  Time: _____                                          Staff Signature:  ____________________________ Date: ________  Time: _____

## 2024-11-09 NOTE — PROGRESS NOTES
"1339; Pt became teary after another pt was visited, \"I want my mom... I want my mom\" pt encouraged to call mom to come visit, pt stated \"she won't be able to come today\"patient was asked what staff could do to help her calm down, pt declined. Pt walked to her room with SIO staff, lightly started to head bang, PRN Zyprexa and Hydroxyzine administered, pt encouraged to walk out of room, agreeable after a few attempts, pt utilized head phones, appeared calm at the end of the shift, no injuries noted on assessment, reported to pt's assigned RN.  "
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
## 2024-11-09 NOTE — PLAN OF CARE
Problem: Sleep Disturbance  Goal: Adequate Sleep/Rest  Outcome: Progressing   Goal Outcome Evaluation:  Focus: Shift summary    Data: Patient slept 7 hours last night. No interventions needed. Remains on 1:1 SIO for SI/SIB risk. Respirations even and unlabored on status 15 checks. Will continue to monitor and report to oncoming staff.    Response: Report sleep hours to day shift. Continue to monitor patient and provide therapeutic interventions as necessary.

## 2024-11-10 LAB
ATRIAL RATE - MUSE: 56 BPM
ATRIAL RATE - MUSE: 58 BPM
DIASTOLIC BLOOD PRESSURE - MUSE: NORMAL MMHG
DIASTOLIC BLOOD PRESSURE - MUSE: NORMAL MMHG
INTERPRETATION ECG - MUSE: NORMAL
INTERPRETATION ECG - MUSE: NORMAL
P AXIS - MUSE: 20 DEGREES
P AXIS - MUSE: 34 DEGREES
PR INTERVAL - MUSE: 168 MS
PR INTERVAL - MUSE: 176 MS
QRS DURATION - MUSE: 86 MS
QRS DURATION - MUSE: 88 MS
QT - MUSE: 414 MS
QT - MUSE: 416 MS
QTC - MUSE: 399 MS
QTC - MUSE: 408 MS
R AXIS - MUSE: 21 DEGREES
R AXIS - MUSE: 31 DEGREES
SYSTOLIC BLOOD PRESSURE - MUSE: NORMAL MMHG
SYSTOLIC BLOOD PRESSURE - MUSE: NORMAL MMHG
T AXIS - MUSE: 10 DEGREES
T AXIS - MUSE: 3 DEGREES
VALPROATE SERPL-MCNC: 41.9 UG/ML
VENTRICULAR RATE- MUSE: 56 BPM
VENTRICULAR RATE- MUSE: 58 BPM

## 2024-11-10 PROCEDURE — 36415 COLL VENOUS BLD VENIPUNCTURE: CPT | Performed by: PSYCHIATRY & NEUROLOGY

## 2024-11-10 PROCEDURE — 250N000013 HC RX MED GY IP 250 OP 250 PS 637: Performed by: CLINICAL NURSE SPECIALIST

## 2024-11-10 PROCEDURE — 250N000013 HC RX MED GY IP 250 OP 250 PS 637: Performed by: EMERGENCY MEDICINE

## 2024-11-10 PROCEDURE — 250N000013 HC RX MED GY IP 250 OP 250 PS 637: Performed by: NURSE PRACTITIONER

## 2024-11-10 PROCEDURE — 80164 ASSAY DIPROPYLACETIC ACD TOT: CPT | Performed by: PSYCHIATRY & NEUROLOGY

## 2024-11-10 PROCEDURE — 128N000002 HC R&B CD/MH ADOLESCENT

## 2024-11-10 RX ORDER — DIVALPROEX SODIUM 250 MG/1
250 TABLET, FILM COATED, EXTENDED RELEASE ORAL DAILY
Status: COMPLETED | OUTPATIENT
Start: 2024-11-10 | End: 2024-11-10

## 2024-11-10 RX ADMIN — FAMOTIDINE 20 MG: 20 TABLET ORAL at 20:14

## 2024-11-10 RX ADMIN — Medication 25 MCG: at 08:32

## 2024-11-10 RX ADMIN — DOCUSATE SODIUM 100 MG: 100 CAPSULE, LIQUID FILLED ORAL at 08:33

## 2024-11-10 RX ADMIN — DICYCLOMINE HYDROCHLORIDE 20 MG: 20 TABLET ORAL at 20:14

## 2024-11-10 RX ADMIN — MICONAZOLE NITRATE: 20 POWDER TOPICAL at 08:32

## 2024-11-10 RX ADMIN — DIVALPROEX SODIUM 250 MG: 250 TABLET, FILM COATED, EXTENDED RELEASE ORAL at 20:14

## 2024-11-10 RX ADMIN — MICONAZOLE NITRATE: 20 POWDER TOPICAL at 20:26

## 2024-11-10 RX ADMIN — LEVOTHYROXINE SODIUM 25 MCG: 25 TABLET ORAL at 08:32

## 2024-11-10 RX ADMIN — DOCUSATE SODIUM 100 MG: 100 CAPSULE, LIQUID FILLED ORAL at 20:14

## 2024-11-10 RX ADMIN — PANTOPRAZOLE SODIUM 40 MG: 20 TABLET, DELAYED RELEASE ORAL at 08:33

## 2024-11-10 RX ADMIN — DICYCLOMINE HYDROCHLORIDE 20 MG: 20 TABLET ORAL at 08:33

## 2024-11-10 RX ADMIN — DIVALPROEX SODIUM 250 MG: 250 TABLET, FILM COATED, EXTENDED RELEASE ORAL at 20:47

## 2024-11-10 RX ADMIN — FLUOXETINE HYDROCHLORIDE 80 MG: 40 CAPSULE ORAL at 08:33

## 2024-11-10 RX ADMIN — TRAZODONE HYDROCHLORIDE 100 MG: 100 TABLET ORAL at 20:14

## 2024-11-10 RX ADMIN — CLONIDINE HYDROCHLORIDE 0.1 MG: 0.1 TABLET ORAL at 20:14

## 2024-11-10 RX ADMIN — ACETAMINOPHEN 650 MG: 325 TABLET, FILM COATED ORAL at 16:06

## 2024-11-10 RX ADMIN — Medication 1 TABLET: at 08:33

## 2024-11-10 RX ADMIN — SENNOSIDES AND DOCUSATE SODIUM 1 TABLET: 50; 8.6 TABLET ORAL at 16:06

## 2024-11-10 RX ADMIN — OLANZAPINE 10 MG: 10 TABLET, FILM COATED ORAL at 16:06

## 2024-11-10 RX ADMIN — CETIRIZINE HYDROCHLORIDE 10 MG: 10 TABLET, FILM COATED ORAL at 08:33

## 2024-11-10 RX ADMIN — DIVALPROEX SODIUM 250 MG: 250 TABLET, FILM COATED, EXTENDED RELEASE ORAL at 08:33

## 2024-11-10 NOTE — PLAN OF CARE
Problem: Sleep Disturbance  Goal: Adequate Sleep/Rest  Outcome: Progressing   Goal Outcome Evaluation:  Focus: Shift summary    Data: Patient slept 6 hours last night. No interventions needed. Continues on 1:1 SIO for SI/SIB risk. Respirations even and unlabored on status 15 checks. Will continue to monitor and report to oncoming staff.    Response: Report sleep hours to day shift. Continue to monitor patient and provide therapeutic interventions as necessary.

## 2024-11-10 NOTE — PLAN OF CARE
"  Problem: Suicide Risk  Goal: Absence of Self-Harm  Outcome: Not Progressing   Goal Outcome Evaluation:       Pt had a good day and weekend. She was excited about her mom and sister coming to visit and introduced this RN to them. When it was time for them to leave at 1430, pt began to cry hysterically asking to go home. We discussed going home and then pt stated \"I'm going to kill myself because my mom doesn't want me home yet as I'm to unstable\"! This RN told pt she had a great weekend. \"I don't care, I'm going to kill myself anyway's\".   Pt can be very difficult when she get's upset and as she becomes dysregulated, there is no rational thinking or insight and this is when she begins to harm herself. As it is with the crying and threats to kill self, she crawled into bed under the covers stating she was going \"to kill self\"!  She remains a 1:1 for this reason of threats to kill self.  SIO 1:1 will remain.                  "

## 2024-11-11 LAB
ALBUMIN UR-MCNC: NEGATIVE MG/DL
APPEARANCE UR: CLEAR
BILIRUB UR QL STRIP: NEGATIVE
COLOR UR AUTO: YELLOW
GLUCOSE UR STRIP-MCNC: NEGATIVE MG/DL
HGB UR QL STRIP: NEGATIVE
KETONES UR STRIP-MCNC: 10 MG/DL
LEUKOCYTE ESTERASE UR QL STRIP: NEGATIVE
MUCOUS THREADS #/AREA URNS LPF: PRESENT /LPF
NITRATE UR QL: NEGATIVE
PH UR STRIP: 6 [PH] (ref 5–7)
RBC URINE: 0 /HPF
SP GR UR STRIP: 1.03 (ref 1–1.03)
SQUAMOUS EPITHELIAL: <1 /HPF
UROBILINOGEN UR STRIP-MCNC: NORMAL MG/DL
WBC URINE: 1 /HPF

## 2024-11-11 PROCEDURE — 250N000013 HC RX MED GY IP 250 OP 250 PS 637: Performed by: EMERGENCY MEDICINE

## 2024-11-11 PROCEDURE — 250N000013 HC RX MED GY IP 250 OP 250 PS 637: Performed by: NURSE PRACTITIONER

## 2024-11-11 PROCEDURE — 99232 SBSQ HOSP IP/OBS MODERATE 35: CPT | Performed by: NURSE PRACTITIONER

## 2024-11-11 PROCEDURE — 250N000011 HC RX IP 250 OP 636: Performed by: EMERGENCY MEDICINE

## 2024-11-11 PROCEDURE — 128N000002 HC R&B CD/MH ADOLESCENT

## 2024-11-11 PROCEDURE — 250N000013 HC RX MED GY IP 250 OP 250 PS 637: Performed by: CLINICAL NURSE SPECIALIST

## 2024-11-11 PROCEDURE — 81003 URINALYSIS AUTO W/O SCOPE: CPT | Performed by: NURSE PRACTITIONER

## 2024-11-11 RX ORDER — PROMETHAZINE HYDROCHLORIDE 12.5 MG/1
12.5 TABLET ORAL EVERY 6 HOURS PRN
Status: DISCONTINUED | OUTPATIENT
Start: 2024-11-11 | End: 2024-11-22 | Stop reason: HOSPADM

## 2024-11-11 RX ORDER — OLANZAPINE 5 MG/1
5 TABLET ORAL AT BEDTIME
Status: DISCONTINUED | OUTPATIENT
Start: 2024-11-11 | End: 2024-11-19

## 2024-11-11 RX ADMIN — ONDANSETRON HYDROCHLORIDE 8 MG: 4 TABLET, FILM COATED ORAL at 17:16

## 2024-11-11 RX ADMIN — Medication 25 MCG: at 07:53

## 2024-11-11 RX ADMIN — DIVALPROEX SODIUM 250 MG: 250 TABLET, FILM COATED, EXTENDED RELEASE ORAL at 20:37

## 2024-11-11 RX ADMIN — FLUOXETINE HYDROCHLORIDE 80 MG: 40 CAPSULE ORAL at 07:51

## 2024-11-11 RX ADMIN — DOCUSATE SODIUM 100 MG: 100 CAPSULE, LIQUID FILLED ORAL at 20:37

## 2024-11-11 RX ADMIN — OLANZAPINE 5 MG: 5 TABLET, FILM COATED ORAL at 20:37

## 2024-11-11 RX ADMIN — MICONAZOLE NITRATE: 20 POWDER TOPICAL at 20:39

## 2024-11-11 RX ADMIN — TRAZODONE HYDROCHLORIDE 100 MG: 100 TABLET ORAL at 20:37

## 2024-11-11 RX ADMIN — ACETAMINOPHEN 650 MG: 325 TABLET, FILM COATED ORAL at 08:12

## 2024-11-11 RX ADMIN — Medication 1 TABLET: at 07:52

## 2024-11-11 RX ADMIN — DICYCLOMINE HYDROCHLORIDE 20 MG: 20 TABLET ORAL at 20:37

## 2024-11-11 RX ADMIN — DICYCLOMINE HYDROCHLORIDE 20 MG: 20 TABLET ORAL at 07:51

## 2024-11-11 RX ADMIN — CETIRIZINE HYDROCHLORIDE 10 MG: 10 TABLET, FILM COATED ORAL at 07:53

## 2024-11-11 RX ADMIN — MICONAZOLE NITRATE 1 APPLICATOR: 20 POWDER TOPICAL at 08:32

## 2024-11-11 RX ADMIN — DIVALPROEX SODIUM 250 MG: 250 TABLET, FILM COATED, EXTENDED RELEASE ORAL at 07:52

## 2024-11-11 RX ADMIN — PANTOPRAZOLE SODIUM 40 MG: 20 TABLET, DELAYED RELEASE ORAL at 07:53

## 2024-11-11 RX ADMIN — DOCUSATE SODIUM 100 MG: 100 CAPSULE, LIQUID FILLED ORAL at 07:52

## 2024-11-11 RX ADMIN — LEVOTHYROXINE SODIUM 25 MCG: 25 TABLET ORAL at 07:52

## 2024-11-11 RX ADMIN — CLONIDINE HYDROCHLORIDE 0.1 MG: 0.1 TABLET ORAL at 20:37

## 2024-11-11 RX ADMIN — ALUMINUM HYDROXIDE, MAGNESIUM HYDROXIDE, AND SIMETHICONE 30 ML: 200; 200; 20 SUSPENSION ORAL at 17:49

## 2024-11-11 RX ADMIN — FAMOTIDINE 20 MG: 20 TABLET ORAL at 20:37

## 2024-11-11 NOTE — PLAN OF CARE
Problem: Sleep Disturbance  Goal: Adequate Sleep/Rest  Outcome: Not Progressing   Goal Outcome Evaluation:  Focus: Shift summary    Data: Patient slept 5 last night; sleep interrupted at 0400 by an incontinence of urine episode in bed. Bed sheets were changed and patient stated that this has not happened before. Patient reports that she had a nightmare tonight. Patient utilized coping mechanisms such as music and writing in her book to attempt to calm down and go back to bed. No medical interventions needed. Respirations even and unlabored on status 15 checks. Will continue to monitor and report to oncoming staff.    Response: Report sleep hours to day shift. Continue to monitor patient and provide therapeutic interventions as necessary.

## 2024-11-11 NOTE — PLAN OF CARE
Team Note Due:  Monday  Assessment/Intervention/Current Symptoms and Care Coordination  Chart review and met with team, discussed pt progress, symptomology, and response to treatment.  Discussed the discharge plan and any potential impediments to discharge.    There was discussion about pt needing to discharge to a group home.     Discharge Plan or Goal  Pending stabilization & development of a safe discharge plan.    Dispo Plan: Still assessing  Discharge Date/ time: TBD  Transportation: TBD  Provisional Discharge: Yes[] No[x]    Barriers to Discharge   Patient requires further psychiatric stabilization due to current symptomology    Referral Status  Still assessing    Legal Status  Patient is voluntary          Contacts:  Medication Management/Psychiatry:  Name/Clinic: LINN Tong CNP at [facility]. Last visit was 11/6/24  Number: [phone]     Therapist:   Name/Clinic: Lynne Sawyer at [facility]   Number: [phone]     /ACT Team:  Name/Clinic: Pau Buck at [facility]    Number:      Other contact information (family, friends, SO) and DANDY status: Yarely Ross (Mother) [phone]       Upcoming Meetings and Dates/Important Information and next steps:  CTC will coordinate with treatment team, community supports, and pt to establish safe discharge plan  CTC will follow up with  to discuss group home referral.

## 2024-11-11 NOTE — PLAN OF CARE
Problem: Suicide Risk  Goal: Absence of Self-Harm  Outcome: Progressing   Goal Outcome Evaluation:         Pt remains on SIO 1:1 for safety. Pt had a bright affect on approach. Pt denies anxiety, endorses depression. Pt endorses some SI, contracts for safety in the hospital. Pt c/o nausea, indigestion, and dizziness after dinner. Vitals WDL. Pt requested and received PRN Zofran for nausea and PRN Maalox for indigestion. Pt has multiple requests and frequently seeks out staff. Pt medication compliant. No behavioral or safety concerns this shift. Will continue to monitor.

## 2024-11-11 NOTE — PROGRESS NOTES
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 0.1 mg at bedtime  -- Depakote, 250 mg twice a day  -- Decrease Prozac to 60 mg, it might be increasing the impulsivity  -- Zyprexa, 5 mg at bedtime  -- Trazodone, 100 mg at bedtime  -- Hydroxyzine and Zyprexa are available as needed    Medical:  --Internal medicine to follow up for medical problems     --Lab work was reviewed.  Urinalysis is abnormal.  The rest of the lab work is WNL.  --Valproic acid 41.9  --Urine culture was ordered    Consults:   --Care was coordinated with the treatment team.   --The patient was consulted on nature of illness and treatment options.     --SIO for safety     Disposition Plan   Reason for ongoing admission: poses an imminent risk to self  Discharge location:  TBD  Discharge Medications: not ordered  Follow-up Appointments: not scheduled  Legal Status: voluntary   Discharge will be granted once symptoms improved.    Contacts:  Medication Management/Psychiatry:  Name/Clinic: LINN Tong CNP at [facility]. Last visit was 11/6/24  Number: [phone]     Therapist:   Name/Clinic: Lynne Sawyer at [facility]   Number: [phone]     /ACT Team:  Name/Clinic: Pau Buck at [place]    Number:      Other contact information (family, friends, SO) and DANDY status:  Yarely Ross (Mother) [phone]      Adrienne ESTEVES, CNP    This note was created with the help of Dragon dictation system. All grammatical/typing errors or context distortion are unintentional and inherent to software.

## 2024-11-11 NOTE — PHARMACY-ADMISSION MEDICATION HISTORY
Pharmacist Admission Medication History - Brief Note     Per chart review, patient last received Aristada 882 mg Q28 days on 10/31/24 at [facility]. Please see Medication Scribe note from 11/5/24 for more information on other PTA medications.     VINNY Song   Available on [employer]

## 2024-11-11 NOTE — PROGRESS NOTES
This writer visited this patient in her room, where they had a conversation related to how the patient was feeling. When asked how she was feeling today, this patient stated that she was anxious. When asked what she usually helps with her anxiety, patient stated that the 1:1, walking, listening to music , and working on puzzles. Resident informed this writer that she feels like harming herself by hitting her head against the wall, biting herself, and picking on her skin. She also said she only wants to harm herself but not others. When asked if her medications help her feel better, this patient stated that they actually make her suicidal. She stated that her medications were changed, and are not working for her.

## 2024-11-11 NOTE — PROGRESS NOTES
11/11/24 1124   Individualization/Patient Specific Goals   Patient Vulnerabilities adverse childhood experience(s);lacks insight into illness;history of unsuccessful treatment   Interprofessional Rounds   Summary There was discussion about   Participants advanced practice nurse;CTC;nursing   Behavioral Team Discussion   Participants Adrienne Tejada APRN; Dilshad Valverde RN; Lynne Morel CTC   Progress Minimal. Recent Admit   Anticipated length of stay 3 to 5 days or until stable   Continued Stay Criteria/Rationale SI   Medical/Physical See H&P   Precautions See belwo   Plan Stablize on medications and establish safe discharge plan   Rationale for change in precautions or plan Intial plan   Safety Plan Safe secure milieu   Anticipated Discharge Disposition group home;home with family     PRECAUTIONS AND SAFETY    Behavioral Orders   Procedures    Assault precautions    Code 1 - Restrict to Unit    Discontinue 1:1 attendant for suicide risk     Order Specific Question:   I have performed an in person assessment of the patient     Answer:   Based on this assessment the patient no longer requires a one on one attendant at this point in time.     Order Specific Question:   Rationale     Answer:   Medical Record Reviewed     Order Specific Question:   Rationale     Answer:   Modifications to care environment made to mitigate safety risk     Order Specific Question:   Rationale     Answer:   Routine observations are sufficient to monitor safety.     Order Specific Question:   Rationale     Answer:   Response to medication    Routine Programming     As clinically indicated    Status 15     Every 15 minutes.    Status Individual Observation     Patient SIO status reviewed with team/RN.  Please also refer to RN/team documentation for add'l detail.    -SIO staff to monitor following which have contributed to patient being on SIO:  Patient is not stable and is at high risk for self harm.   -Possible interventions SIO staff  could use to support patient's treatment progress:  Offer coping skills or medications.   -When following observed, team will review discontinuation of SIO:  Terminate when patient is able to follow direction and maintain safety.     Order Specific Question:   CONTINUOUS 24 hours / day     Answer:   Other     Order Specific Question:   Specify distance     Answer:   10 feet     Order Specific Question:   Indications for SIO     Answer:   Self-injury risk    Suicide precautions: Suicide Risk: HIGH     Patients on Suicide Precautions should have a Combination Diet ordered that includes a Diet selection(s) AND a Behavioral Tray selection for Safe Tray - with utensils, or Safe Tray - NO utensils       Order Specific Question:   Suicide Risk     Answer:   HIGH       Safety  Safety WDL: WDL  Patient Location: hallway  Observed Behavior: sitting  Observed Behavior (Comment): has headache

## 2024-11-11 NOTE — H&P
Matt Romero oncall  provider notified at 2007 hrs     SM rm 606, MRN: [id number]. Pt Valproic acid level from this AM, low 41.9. Pt reports hx of seizures states s/s include dizziness and LOC for 20 mins (post-ictal phase s/s unknown). Has been reporting dizziness this evening, & nausea.     VSS, currently sitting up in bed. Anything else we should do besides monitor & outing her on seizure precautions? Also would u like to order antiemetic for nausea?     --- Addendum -- 2027 hrs  Dr Romero said to give 1 extra dose of Depakote 250 mg tonight

## 2024-11-11 NOTE — PLAN OF CARE
BEH IP Unit Acuity Rating Score (UARS)  Patient is given one point for every criteria they meet.    CRITERIA SCORING   On a 72 hour hold, court hold, committed, stay of commitment, or revocation. 0    Patient LOS on BEH unit exceeds 20 days. 0  LOS: 3   Patient under guardianship, 55+, otherwise medically complex, or under age 11. 0   Suicide ideation without relief of precipitating factors. 1   Current plan for suicide. 0   Current plan for homicide. 0   Imminent risk or actual attempt to seriously harm another without relief of factors precipitating the attempt. 0   Severe dysfunction in daily living (ex: complete neglect for self care, extreme disruption in vegetative function, extreme deterioration in social interactions). 1   Recent (last 7 days) or current physical aggression in the ED or on unit. 0   Restraints or seclusion episode in past 72 hours. 0   Recent (last 7 days) or current verbal aggression, agitation, yelling, etc., while in the ED or unit. 0   Active psychosis. 0   Need for constant or near constant redirection (from leaving, from others, etc).  1   Intrusive or disruptive behaviors. 0   Patient requires 3 or more hours of individualized nursing care per 8-hour shift (i.e. for ADLs, meds, therapeutic interventions). 1   TOTAL 4

## 2024-11-11 NOTE — PLAN OF CARE
11/11/24 1500   General Information   Has Not Attended OT as of: 11/11/24     Pt has not attended scheduled occupational therapy sessions. Encourage attendance and participation. Pt will be given self-assessment form, and OT staff will explain the purpose of including them in their treatment plan and offer options for meeting their needs.

## 2024-11-11 NOTE — PROGRESS NOTES
"CLINICAL NUTRITION SERVICES - ASSESSMENT NOTE     Nutrition Prescription    RECOMMENDATIONS FOR MDs/PROVIDERS TO ORDER:  None at this time    Malnutrition Status:    Patient does not meet two of the established criteria necessary for diagnosing malnutrition but is at risk for malnutrition    Recommendations already ordered by Registered Dietitian (RD):  -Ginger ale w/ meals    Future/Additional Recommendations:  Monitor PO intake, weight trends     REASON FOR ASSESSMENT  Sadaf Ross is a/an 24 year old female assessed by the dietitian for Admission Nutrition Risk Screen for positive weight loss.     CLINICAL HISTORY  PMHx significant for borderline personality disorder, ADHD, PTSD. Admitted from ED 11/4/24 due to concern for increased suicidal ideation and urges to self-harm.     NUTRITION HISTORY  RD met with Sadaf at bedside who reports ongoing poor appetite, unable to provide history of appetite and intake prior to admission. Pt expressed surprise about weight loss, declined snacks/supplements. Agreeable to ginger ale w/ meals for intermittent nausea.     GI: mild constipation/nausea per pt    CURRENT NUTRITION ORDERS  Diet: Regular  Supplement: none  Allergies: NKFA  Intake/Tolerance: limited documentation, 60% dinner 11/10 per RN note    LABS  Reviewed     MEDICATIONS  Depakote ER, colace, pepcid, MVI-M, zyprexa, protonix, vit D3, senokot-s prn    ANTHROPOMETRICS  Height: 160 cm (5' 3\")  Most Recent Weight: 106.9 kg (235 lb 11.2 oz)    IBW: 52.3 kg   BMI: Obesity Grade III BMI >40  Weight History:   Wt Readings from Last 15 Encounters:   11/08/24 106.9 kg (235 lb 11.2 oz)   10/31/24 112.5 kg (248 lb 0.3 oz)    10/28/24 111.6 kg (246 lb)   10/25/24 115.7 kg (255 lb)   10/19/24 115.7 kg (255 lb)   10/17/24 115.7 kg (255 lb)   10/17/24 115.7 kg (255 lb)   10/17/24 111.8 kg (246 lb 6.4 oz)   08/08/24 112.8 kg (248 lb 11.2 oz)   08/05/24 111.1 kg (245 lb)   07/24/24 112.5 kg (248 lb)   07/15/24 111.1 kg (245 " lb)   06/26/24 113.4 kg (250 lb)   06/25/24 113.4 kg (250 lb)   06/17/24 116.8 kg (257 lb 8 oz)   06/13/24 112.9 kg (249 lb)   4.9% weight loss in 8 days - does not meet criteria for significant weight loss    Dosing Weight: 52 kg (IBW)    ASSESSED NUTRITION NEEDS  Estimated Energy Needs: 1300 - 1560 kcals/day (25 - 30 kcals/kg)  Justification: Obese  Estimated Protein Needs: 84 - 106 grams protein/day (0.8 - 1 grams of pro/kg)  Justification: Obesity guidelines  Estimated Fluid Needs: 1 mL/kcal  Justification: Maintenance or Per Provider    PHYSICAL FINDINGS  See malnutrition section below.  Freddie: 22  No abnormal nutrition-related physical findings observed.     MALNUTRITION  % Intake: Unable to assess  % Weight Loss: Weight loss does not meet criteria  Subcutaneous Fat Loss: None observed  Muscle Loss: None observed  Fluid Accumulation/Edema: None noted  Malnutrition Diagnosis: Patient does not meet two of the established criteria necessary for diagnosing malnutrition but is at risk for malnutrition    NUTRITION DIAGNOSIS  Predicted inadequate nutrient intake (kcal/protein) related to poor appetite/recent weight loss    INTERVENTIONS  Implementation  Nutrition Education: RD role in care   Modify composition of meals/snacks     Goals  Patient to consume % of nutritionally adequate meal trays TID, or the equivalent with supplements/snacks.     Monitoring/Evaluation  Progress toward goals will be monitored and evaluated per protocol.    Oliva Sorensen MPH, RDN, LD  Behavioral Health Adult & Pediatric Dietitian  BEH Clinical Dietitian Vocera, Teams, or Desk: [phone]  Weekend/Holiday Vocera: Weekend Holiday Clinical Dietitian [Multi Site Groups]

## 2024-11-11 NOTE — PLAN OF CARE
"Goal Outcome Evaluation:      Plan of Care Reviewed With: patient    Overall Patient Progress: no change      Problem: Anxiety Signs/Symptoms  Goal: Improved Somatic Symptoms (Anxiety Signs/Symptoms)  Outcome: Not Progressing    At shift start pt reports feeling anxious and depressed. Pt starts crying stating that she was upset about visit with her mom, and didn't want to discuss it further. She reports SI and SIB with a plan which she chooses not to disclose.      Pt also stated that  she has hx of seizures s/s pre-seizure per pt include dizziness and fainting, then loss of consciousness for 20 mins -- unclear what post-ictal phase  looks like. Has been reporting dizziness this evening, & nausea, VSS WNL. Encouraged getting up slowly, did ice chips for the nausea, lavender for anxiety and distraction,  informed oncall about the s/s and the low valproic acid level (see MAR), and requested antiemetic from oncall. One extra dose of Valproic acid given tonight per oncall's order see provider notification note.    Pt spent most of shift in room napping, or sitting on bed. Writer was called into pt room x2 to re-assess pt, each time reassurance and distraction seemed to help. Once pt was found by writer on her knees hovering over toilet and reporting nausea as well as anxiety. Deep breathing encouraged, ice pack on upper back, lying down, and listening to music seemed to help.  Med compliant, ate fair amount - 60% of entree.     /82   Pulse 83   Temp 97.4  F (36.3  C) (Temporal)   Resp 18   Ht 1.6 m (5' 3\")   Wt 106.9 kg (235 lb 11.2 oz)   LMP  (LMP Unknown)   SpO2 98%   BMI 41.75 kg/m           "

## 2024-11-11 NOTE — PLAN OF CARE
"  Problem: Suicide Risk  Goal: Absence of Self-Harm  Outcome: Not Progressing   Goal Outcome Evaluation:     Plan of Care Reviewed With: patient       Pt on 1:1 d/t SI, SIB    Pt med compliant, claimed headache pain 9/10- Tylenol given- within an hour pt claimed pain 9/10. Pt turned down ice and said she would drink more water. She endorsed anxiety 3/10 and depression 9/10. Pt endorsed SI because of \"past happenings.\" Her plans include biting and scratching self. She did say she would contract for safety. About hallucinations, pt said \"Not yet today.\" Pt visited with healing therapy dog, rested in her room, and played cards with students this morning. Pt ate lunch, rested in bed, submitted a urine test that was sent to the lab. She was also visible in the hallway this afternoon. Pt did not want to join groups today. An order for ginger ale with meals is now in effect. She asked to have female nurse this evening.           "

## 2024-11-12 PROCEDURE — 250N000013 HC RX MED GY IP 250 OP 250 PS 637: Performed by: CLINICAL NURSE SPECIALIST

## 2024-11-12 PROCEDURE — 250N000013 HC RX MED GY IP 250 OP 250 PS 637: Performed by: PSYCHIATRY & NEUROLOGY

## 2024-11-12 PROCEDURE — 250N000013 HC RX MED GY IP 250 OP 250 PS 637: Performed by: EMERGENCY MEDICINE

## 2024-11-12 PROCEDURE — 128N000002 HC R&B CD/MH ADOLESCENT

## 2024-11-12 PROCEDURE — 250N000013 HC RX MED GY IP 250 OP 250 PS 637: Performed by: NURSE PRACTITIONER

## 2024-11-12 PROCEDURE — 99232 SBSQ HOSP IP/OBS MODERATE 35: CPT | Performed by: NURSE PRACTITIONER

## 2024-11-12 RX ORDER — BISACODYL 5 MG
5-10 TABLET, DELAYED RELEASE (ENTERIC COATED) ORAL DAILY PRN
Status: DISCONTINUED | OUTPATIENT
Start: 2024-11-12 | End: 2024-11-22 | Stop reason: HOSPADM

## 2024-11-12 RX ORDER — HYDROXYZINE HYDROCHLORIDE 25 MG/1
25-50 TABLET, FILM COATED ORAL EVERY 4 HOURS PRN
Status: DISCONTINUED | OUTPATIENT
Start: 2024-11-12 | End: 2024-11-22 | Stop reason: HOSPADM

## 2024-11-12 RX ORDER — POLYETHYLENE GLYCOL 3350 17 G/17G
17 POWDER, FOR SOLUTION ORAL DAILY
Status: DISCONTINUED | OUTPATIENT
Start: 2024-11-12 | End: 2024-11-14

## 2024-11-12 RX ORDER — POLYETHYLENE GLYCOL 3350 17 G/17G
238 POWDER, FOR SOLUTION ORAL ONCE
Status: DISCONTINUED | OUTPATIENT
Start: 2024-11-12 | End: 2024-11-12

## 2024-11-12 RX ORDER — OLANZAPINE 10 MG/2ML
10 INJECTION, POWDER, FOR SOLUTION INTRAMUSCULAR 3 TIMES DAILY PRN
Status: DISCONTINUED | OUTPATIENT
Start: 2024-11-12 | End: 2024-11-22

## 2024-11-12 RX ORDER — OLANZAPINE 5 MG/1
5-10 TABLET ORAL 3 TIMES DAILY PRN
Status: DISCONTINUED | OUTPATIENT
Start: 2024-11-12 | End: 2024-11-22

## 2024-11-12 RX ADMIN — Medication 1 TABLET: at 07:54

## 2024-11-12 RX ADMIN — FAMOTIDINE 20 MG: 20 TABLET ORAL at 19:45

## 2024-11-12 RX ADMIN — MICONAZOLE NITRATE: 20 POWDER TOPICAL at 10:59

## 2024-11-12 RX ADMIN — HYDROXYZINE HYDROCHLORIDE 50 MG: 25 TABLET, FILM COATED ORAL at 16:17

## 2024-11-12 RX ADMIN — HYDROXYZINE HYDROCHLORIDE 25 MG: 25 TABLET, FILM COATED ORAL at 10:14

## 2024-11-12 RX ADMIN — FLUOXETINE HYDROCHLORIDE 60 MG: 40 CAPSULE ORAL at 07:55

## 2024-11-12 RX ADMIN — CLONIDINE HYDROCHLORIDE 0.1 MG: 0.1 TABLET ORAL at 19:44

## 2024-11-12 RX ADMIN — OLANZAPINE 5 MG: 5 TABLET, FILM COATED ORAL at 19:43

## 2024-11-12 RX ADMIN — TRAZODONE HYDROCHLORIDE 100 MG: 100 TABLET ORAL at 19:44

## 2024-11-12 RX ADMIN — OLANZAPINE 5 MG: 5 TABLET, FILM COATED ORAL at 16:17

## 2024-11-12 RX ADMIN — MICONAZOLE NITRATE: 20 POWDER TOPICAL at 19:45

## 2024-11-12 RX ADMIN — CETIRIZINE HYDROCHLORIDE 10 MG: 10 TABLET, FILM COATED ORAL at 07:53

## 2024-11-12 RX ADMIN — DIVALPROEX SODIUM 250 MG: 250 TABLET, FILM COATED, EXTENDED RELEASE ORAL at 19:43

## 2024-11-12 RX ADMIN — Medication 25 MCG: at 07:54

## 2024-11-12 RX ADMIN — DICYCLOMINE HYDROCHLORIDE 20 MG: 20 TABLET ORAL at 19:43

## 2024-11-12 RX ADMIN — DOCUSATE SODIUM 100 MG: 100 CAPSULE, LIQUID FILLED ORAL at 19:42

## 2024-11-12 RX ADMIN — LEVOTHYROXINE SODIUM 25 MCG: 25 TABLET ORAL at 07:55

## 2024-11-12 RX ADMIN — POLYETHYLENE GLYCOL 3350 17 G: 17 POWDER, FOR SOLUTION ORAL at 14:07

## 2024-11-12 RX ADMIN — DIVALPROEX SODIUM 250 MG: 250 TABLET, FILM COATED, EXTENDED RELEASE ORAL at 07:55

## 2024-11-12 RX ADMIN — DICYCLOMINE HYDROCHLORIDE 20 MG: 20 TABLET ORAL at 07:53

## 2024-11-12 RX ADMIN — DOCUSATE SODIUM 100 MG: 100 CAPSULE, LIQUID FILLED ORAL at 07:54

## 2024-11-12 RX ADMIN — PANTOPRAZOLE SODIUM 40 MG: 20 TABLET, DELAYED RELEASE ORAL at 07:53

## 2024-11-12 NOTE — PLAN OF CARE
BEH IP Unit Acuity Rating Score (UARS)  Patient is given one point for every criteria they meet.    CRITERIA SCORING   On a 72 hour hold, court hold, committed, stay of commitment, or revocation. 0    Patient LOS on BEH unit exceeds 20 days. 0  LOS: 4   Patient under guardianship, 55+, otherwise medically complex, or under age 11. 0   Suicide ideation without relief of precipitating factors. 1   Current plan for suicide. 0   Current plan for homicide. 0   Imminent risk or actual attempt to seriously harm another without relief of factors precipitating the attempt. 0   Severe dysfunction in daily living (ex: complete neglect for self care, extreme disruption in vegetative function, extreme deterioration in social interactions). 1   Recent (last 7 days) or current physical aggression in the ED or on unit. 0   Restraints or seclusion episode in past 72 hours. 0   Recent (last 7 days) or current verbal aggression, agitation, yelling, etc., while in the ED or unit. 0   Active psychosis. 0   Need for constant or near constant redirection (from leaving, from others, etc).  0   Intrusive or disruptive behaviors. 0   Patient requires 3 or more hours of individualized nursing care per 8-hour shift (i.e. for ADLs, meds, therapeutic interventions). 1   TOTAL 3

## 2024-11-12 NOTE — PLAN OF CARE
Problem: Sleep Disturbance  Goal: Adequate Sleep/Rest  Outcome: Not Progressing   Goal Outcome Evaluation:  Focus: Shift summary    Data: Patient slept 5.5 last night. Woke up at 0130 stating that she was feeling dizziness. Fellow RN assessed patient and vitals were:     11/12/24 0130   Vital Signs   Temp 97.8  F (36.6  C)   Temp src Temporal   Resp 20   Pulse 75   Pulse Rate Source Monitor   /74   Cuff Size Adult Regular   Oxygen Therapy   SpO2 100 %   O2 Device None (Room air)     Re-assessed at 0230 by writer with the following results:     11/12/24 0230   Vital Signs   Temp 97.8  F (36.6  C)   Temp src Temporal   Resp 16   Pulse 84   Pulse Rate Source Monitor   /75   Cuff Size Adult Regular   Oxygen Therapy   SpO2 98 %   O2 Device None (Room air)     No need for further interventions. Patient did report indigestion after eating a snack but was able to fall back asleep and was asleep on re-check at 0330. Respirations even and unlabored on status 15 checks. Will continue to monitor and report to oncoming staff.    Response: Report sleep hours to day shift. Continue to monitor patient and provide therapeutic interventions as necessary.

## 2024-11-12 NOTE — PLAN OF CARE
"  Problem: Adult Inpatient Plan of Care  Goal: Plan of Care Review  Description: The Plan of Care Review/Shift note should be completed every shift.  The Outcome Evaluation is a brief statement about your assessment that the patient is improving, declining, or no change.  This information will be displayed automatically on your shift  note.  Outcome: Not Progressing   Goal Outcome Evaluation:    Patient up at start of shift.  Affect remains flat, sad but good eye contact.  Ate most of breakfast.  Took morning medications without incident.    Had episode of crying this morning about 1010.  25 mg Hydroxyzine but at 1100 she felt it had not \"kicked in yet\"--although she stopped crying when given the med.  Feels the meds are \"not working\".    Continues to endorse SI but without a plan.  Endorses SIB urges to scratch but has not acted on the urges, and contracts to let staff know if she feels the urges are not manageable.   Rates depression as 9.  Denies anxiety.  Denies hallucinations,    Reports poors sleep at night (although night reports 5.5 hours starting at 2330).  Was advised that she could take Hydroxyzine with night meds.      1200 Now napping.     Did not attend groups this morning.    1300: patient reports bowel movement as \"hard\" and small amount of blood noted on toilet paper.  Reports hx of hemorrhoids   Miralax and prn dulcolax ordered.  Continue to monitor.  "

## 2024-11-12 NOTE — PROGRESS NOTES
"[facility], [address]   Psychiatric Progress Note        Interim History:   From H&P: Sadaf is a 24-year-old female admitted to station 6A for a reported increase in suicidal ideation as well as urges for self-harm. Recent increased mood dysregulation. Patient has a very significant psychiatric history with diagnoses to include: borderline personality disorder, ADHD, PTSD. Presents as ID.  Patient currently living with family however has a history of group home placement.  Patient known to unit staff and has history of complex care plan.  Patient has established outpatient clinicians. History of chronic suicidal ideation and self-harm. History of necessitating physical restraints while hospitalized and SIO.       Team meeting report: The patient's care was discussed with the treatment team during the daily team meeting and/or staff's chart notes were reviewed.  Staff reports the patient has been mostly calm, pleasant, cooperative.  She reported headache in the morning and declined Tylenol.  She reported suicidal ideation because \"of past happiness\".  The patient had urges to bite and scratch herself.  When asked about hallucinations, the patient responded \"not yet\".  She did not attend groups.  In the evening, affect was bright.  She denied depression and anxiety however, continues to report passive suicidal ideation.  She was able to contract for safety.  The patient had multiple requests.  Med compliant.  Slept through the night    Met with patient.  States yesterday was \"not great\".  Reports having epileptic seizure.  That she did not fall to the ground.  She knows it was a seizure because she felt \"cold and clammy\".  Her sleep was up-and-down.  Denied auditory visual hallucinations.  When asked about suicidal thoughts and self-harm, the patient reported \"always\".  She is not able to contract for safety.  Prefers that she has a person watching her to make sure she is safe.  She did not " attend any groups.  Appetite was intact however, felt nauseated which is normal for her.  The patient is aware that she will have a different provider tomorrow.  She is okay with that.    Left a message for patient's mom Yarely Ross phone #[phone].         Medications:     Current Facility-Administered Medications   Medication Dose Route Frequency Provider Last Rate Last Admin    [START ON 11/29/2024] ARIPiprazole lauroxil ER (ARISTADA) intra-muscular 882 mg  882 mg Intramuscular Q28 Days Adrienne Tejada APRN CNP        cetirizine (zyrTEC) tablet 10 mg  10 mg Oral Daily Ambrocio Ferro MD   10 mg at 11/12/24 0753    cloNIDine (CATAPRES) tablet 0.1 mg  0.1 mg Oral At Bedtime Mara Burrows APRN CNP   0.1 mg at 11/11/24 2037    dicyclomine (BENTYL) tablet 20 mg  20 mg Oral BID Ambrocio Ferro MD   20 mg at 11/12/24 0753    divalproex sodium extended-release (DEPAKOTE ER) 24 hr tablet 250 mg  250 mg Oral BID Ambrocio Ferro MD   250 mg at 11/12/24 0755    docusate sodium (COLACE) capsule 100 mg  100 mg Oral BID Ambrocio Ferro MD   100 mg at 11/12/24 0754    famotidine (PEPCID) tablet 20 mg  20 mg Oral At Bedtime Ambrocio Ferro MD   20 mg at 11/11/24 2037    FLUoxetine (PROzac) capsule 60 mg  60 mg Oral Daily Adrienne Tejada APRN CNP   60 mg at 11/12/24 0755    levothyroxine (SYNTHROID/LEVOTHROID) tablet 25 mcg  25 mcg Oral Daily Ambrocio Ferro MD   25 mcg at 11/12/24 0755    miconazole (MICATIN) 2 % powder   Topical BID Leo Lowe MD   Given at 11/11/24 2039    multivitamin w/minerals (THERA-VIT-M) tablet 1 tablet  1 tablet Oral Daily Ambrocio Ferro MD   1 tablet at 11/12/24 0754    OLANZapine (zyPREXA) tablet 5 mg  5 mg Oral At Bedtime Adrienne Tejada APRN CNP   5 mg at 11/11/24 2037    pantoprazole (PROTONIX) EC tablet 40 mg  40 mg Oral Daily Ambrocio Ferro MD   40 mg at 11/12/24 6409    traZODone (DESYREL) tablet 100 mg  100 mg Oral At Bedtime Ambrocio Ferro MD    100 mg at 11/11/24 2037    Vitamin D3 (CHOLECALCIFEROL) tablet 25 mcg  25 mcg Oral Daily Ambrocio Ferro MD   25 mcg at 11/12/24 3865            Allergies:     Allergies   Allergen Reactions    Penicillins Rash and Unknown            Labs:     Recent Results (from the past 4 weeks)   ECG 12-LEAD WITH MUSE ([facility])    Collection Time: 10/16/24  3:22 PM   Result Value Ref Range    Systolic Blood Pressure  mmHg    Diastolic Blood Pressure  mmHg    Ventricular Rate 56 BPM    Atrial Rate 56 BPM    IN Interval 168 ms    QRS Duration 88 ms     ms    QTc 399 ms    P Axis 34 degrees    R AXIS 31 degrees    T Axis 10 degrees    Interpretation ECG       Sinus bradycardia  Otherwise normal ECG  When compared with ECG of 11-Jun-2024 20:49,  No significant change was found  Confirmed by SEE ED PROVIDER NOTE FOR, ECG INTERPRETATION ([id number]),  TERESA SNOWDEN ([id number]) on 11/10/2024 5:02:20 AM     Basic metabolic panel    Collection Time: 10/16/24  3:40 PM   Result Value Ref Range    Sodium 143 135 - 145 mmol/L    Potassium 4.5 3.4 - 5.3 mmol/L    Chloride 108 (H) 98 - 107 mmol/L    Carbon Dioxide (CO2) 23 22 - 29 mmol/L    Anion Gap 12 7 - 15 mmol/L    Urea Nitrogen 8.4 6.0 - 20.0 mg/dL    Creatinine 0.81 0.51 - 0.95 mg/dL    GFR Estimate >90 >60 mL/min/1.73m2    Calcium 9.3 8.8 - 10.4 mg/dL    Glucose 94 70 - 99 mg/dL   Magnesium    Collection Time: 10/16/24  3:40 PM   Result Value Ref Range    Magnesium 2.3 1.7 - 2.3 mg/dL   HCG qualitative Blood    Collection Time: 10/16/24  3:40 PM   Result Value Ref Range    hCG Serum Qualitative Negative Negative   CBC with platelets and differential    Collection Time: 10/16/24  3:40 PM   Result Value Ref Range    WBC Count 6.0 4.0 - 11.0 10e3/uL    RBC Count 4.41 3.80 - 5.20 10e6/uL    Hemoglobin 11.8 11.7 - 15.7 g/dL    Hematocrit 36.8 35.0 - 47.0 %    MCV 83 78 - 100 fL    MCH 26.8 26.5 - 33.0 pg    MCHC 32.1 31.5 - 36.5 g/dL    RDW 13.3 10.0 - 15.0 %    Platelet Count 188 150 - 450  10e3/uL    % Neutrophils 56 %    % Lymphocytes 35 %    % Monocytes 5 %    % Eosinophils 3 %    % Basophils 1 %    % Immature Granulocytes 0 %    NRBCs per 100 WBC 0 <1 /100    Absolute Neutrophils 3.4 1.6 - 8.3 10e3/uL    Absolute Lymphocytes 2.1 0.8 - 5.3 10e3/uL    Absolute Monocytes 0.3 0.0 - 1.3 10e3/uL    Absolute Eosinophils 0.2 0.0 - 0.7 10e3/uL    Absolute Basophils 0.0 0.0 - 0.2 10e3/uL    Absolute Immature Granulocytes 0.0 <=0.4 10e3/uL    Absolute NRBCs 0.0 10e3/uL   UA with Microscopic reflex to Culture    Collection Time: 10/16/24  3:41 PM    Specimen: Urine, Midstream   Result Value Ref Range    Color Urine Light Yellow Colorless, Straw, Light Yellow, Yellow    Appearance Urine Clear Clear    Glucose Urine Negative Negative mg/dL    Bilirubin Urine Negative Negative    Ketones Urine Negative Negative mg/dL    Specific Gravity Urine 1.022 1.001 - 1.030    Blood Urine Negative Negative    pH Urine 7.0 5.0 - 7.0    Protein Albumin Urine Negative Negative mg/dL    Urobilinogen Urine <2.0 <2.0 mg/dL    Nitrite Urine Negative Negative    Leukocyte Esterase Urine Negative Negative    Bacteria Urine Few (A) None Seen /HPF    Mucus Urine Present (A) None Seen /LPF    RBC Urine <1 <=2 /HPF    WBC Urine 1 <=5 /HPF    Squamous Epithelials Urine 1 <=1 /HPF   Symptomatic Influenza A/B, RSV, & SARS-CoV2 PCR (COVID-19) Nasopharyngeal    Collection Time: 10/16/24  3:46 PM    Specimen: Nasopharyngeal; Swab   Result Value Ref Range    Influenza A PCR Negative Negative    Influenza B PCR Negative Negative    RSV PCR Negative Negative    SARS CoV2 PCR Negative Negative   ECG 12-LEAD WITH MUSE ([facility])    Collection Time: 10/16/24  5:26 PM   Result Value Ref Range    Systolic Blood Pressure  mmHg    Diastolic Blood Pressure  mmHg    Ventricular Rate 58 BPM    Atrial Rate 58 BPM    DC Interval 176 ms    QRS Duration 86 ms     ms    QTc 408 ms    P Axis 20 degrees    R AXIS 21 degrees    T Axis 3 degrees    Interpretation  ECG       Sinus bradycardia  Otherwise normal ECG  When compared with ECG of 16-Oct-2024 15:22,  No significant change was found  Confirmed by SEE ED PROVIDER NOTE FOR, ECG INTERPRETATION ([id number]),  TERESA SNOWDEN ([id number]) on 11/10/2024 2:50:12 AM     ECG 12-LEAD WITH MUSE ([facility])    Collection Time: 10/17/24  2:23 PM   Result Value Ref Range    Systolic Blood Pressure  mmHg    Diastolic Blood Pressure  mmHg    Ventricular Rate 69 BPM    Atrial Rate 69 BPM    VA Interval 172 ms    QRS Duration 94 ms     ms    QTc 424 ms    P Axis 50 degrees    R AXIS 21 degrees    T Axis 5 degrees    Interpretation ECG       Sinus rhythm  Normal ECG  When compared with ECG of 16-Oct-2024 17:26,  No significant change was found  Confirmed by DIANNE VILLA, ZEUS LOC:[email or web address] ([id number]) on 10/17/2024 3:57:39 PM     Basic metabolic panel    Collection Time: 10/17/24  4:19 PM   Result Value Ref Range    Sodium 143 135 - 145 mmol/L    Potassium 4.5 3.4 - 5.3 mmol/L    Chloride 108 (H) 98 - 107 mmol/L    Carbon Dioxide (CO2) 25 22 - 29 mmol/L    Anion Gap 10 7 - 15 mmol/L    Urea Nitrogen 7.0 6.0 - 20.0 mg/dL    Creatinine 0.81 0.51 - 0.95 mg/dL    GFR Estimate >90 >60 mL/min/1.73m2    Calcium 9.0 8.8 - 10.4 mg/dL    Glucose 88 70 - 99 mg/dL   CBC with platelets and differential    Collection Time: 10/17/24  4:19 PM   Result Value Ref Range    WBC Count 7.1 4.0 - 11.0 10e3/uL    RBC Count 4.22 3.80 - 5.20 10e6/uL    Hemoglobin 11.5 (L) 11.7 - 15.7 g/dL    Hematocrit 35.3 35.0 - 47.0 %    MCV 84 78 - 100 fL    MCH 27.3 26.5 - 33.0 pg    MCHC 32.6 31.5 - 36.5 g/dL    RDW 13.6 10.0 - 15.0 %    Platelet Count 181 150 - 450 10e3/uL    % Neutrophils 60 %    % Lymphocytes 31 %    % Monocytes 6 %    % Eosinophils 2 %    % Basophils 1 %    % Immature Granulocytes 0 %    NRBCs per 100 WBC 0 <1 /100    Absolute Neutrophils 4.3 1.6 - 8.3 10e3/uL    Absolute Lymphocytes 2.2 0.8 - 5.3 10e3/uL    Absolute Monocytes 0.4 0.0 - 1.3 10e3/uL    Absolute  Eosinophils 0.2 0.0 - 0.7 10e3/uL    Absolute Basophils 0.0 0.0 - 0.2 10e3/uL    Absolute Immature Granulocytes 0.0 <=0.4 10e3/uL    Absolute NRBCs 0.0 10e3/uL   Hepatic function panel    Collection Time: 10/17/24  4:19 PM   Result Value Ref Range    Protein Total 7.0 6.4 - 8.3 g/dL    Albumin 4.4 3.5 - 5.2 g/dL    Bilirubin Total 0.2 <=1.2 mg/dL    Alkaline Phosphatase 52 40 - 150 U/L    AST 10 0 - 45 U/L    ALT 11 0 - 50 U/L    Bilirubin Direct <0.20 0.00 - 0.30 mg/dL   Lipase    Collection Time: 10/17/24  4:19 PM   Result Value Ref Range    Lipase 40 13 - 60 U/L   Basic metabolic panel    Collection Time: 10/18/24  5:29 AM   Result Value Ref Range    Sodium 142 135 - 145 mmol/L    Potassium 4.1 3.4 - 5.3 mmol/L    Chloride 107 98 - 107 mmol/L    Carbon Dioxide (CO2) 23 22 - 29 mmol/L    Anion Gap 12 7 - 15 mmol/L    Urea Nitrogen 9.1 6.0 - 20.0 mg/dL    Creatinine 0.93 0.51 - 0.95 mg/dL    GFR Estimate 87 >60 mL/min/1.73m2    Calcium 8.7 (L) 8.8 - 10.4 mg/dL    Glucose 87 70 - 99 mg/dL   Extra Purple Top EDTA (LAB USE ONLY)    Collection Time: 10/18/24  5:29 AM   Result Value Ref Range    Hold Specimen JIC    Echocardiogram Complete    Collection Time: 10/18/24  1:40 PM   Result Value Ref Range    LVEF  60-65%    ECG 12-LEAD WITH MUSE ([facility])    Collection Time: 10/19/24  6:28 AM   Result Value Ref Range    Systolic Blood Pressure  mmHg    Diastolic Blood Pressure  mmHg    Ventricular Rate 57 BPM    Atrial Rate 57 BPM    MN Interval 176 ms    QRS Duration 86 ms     ms    QTc 408 ms    P Axis 38 degrees    R AXIS 59 degrees    T Axis 8 degrees    Interpretation ECG       Sinus bradycardia  Otherwise normal ECG  When compared with ECG of 17-Oct-2024 14:23,  ST no longer elevated in Anterior leads  Confirmed by SEE ED PROVIDER NOTE FOR, ECG INTERPRETATION ([id number]),  TERESA SNOWDEN ([id number]) on 10/19/2024 8:39:46 PM     UA with Microscopic reflex to Culture    Collection Time: 10/25/24  7:40 PM    Specimen:  Urine, Midstream   Result Value Ref Range    Color Urine Light Yellow Colorless, Straw, Light Yellow, Yellow    Appearance Urine Clear Clear    Glucose Urine Negative Negative mg/dL    Bilirubin Urine Negative Negative    Ketones Urine Negative Negative mg/dL    Specific Gravity Urine 1.021 1.001 - 1.030    Blood Urine Negative Negative    pH Urine 6.5 5.0 - 7.0    Protein Albumin Urine Negative Negative mg/dL    Urobilinogen Urine <2.0 <2.0 mg/dL    Nitrite Urine Negative Negative    Leukocyte Esterase Urine Negative Negative    Mucus Urine Present (A) None Seen /LPF    RBC Urine 0 <=2 /HPF    WBC Urine 1 <=5 /HPF    Squamous Epithelials Urine 2 (H) <=1 /HPF   HCG qualitative urine    Collection Time: 10/25/24  7:40 PM   Result Value Ref Range    hCG Urine Qualitative Negative Negative   ECG 12-LEAD WITH MUSE ([facility])    Collection Time: 10/25/24  8:28 PM   Result Value Ref Range    Systolic Blood Pressure 117 mmHg    Diastolic Blood Pressure 56 mmHg    Ventricular Rate 86 BPM    Atrial Rate 86 BPM    RI Interval 184 ms    QRS Duration 80 ms     ms    QTc 411 ms    P Axis 64 degrees    R AXIS 74 degrees    T Axis 22 degrees    Interpretation ECG       Sinus rhythm  Normal ECG  When compared with ECG of 19-Oct-2024 06:28,  Vent. rate has increased by  29 bpm  Confirmed by SEE ED PROVIDER NOTE FOR, ECG INTERPRETATION ([id number]),  TERESA SNOWDEN ([id number]) on 10/27/2024 4:23:55 AM     Wet prep    Collection Time: 10/25/24  8:57 PM    Specimen: Vagina; Swab   Result Value Ref Range    Trichomonas Absent Absent    Yeast Absent Absent    Clue Cells Absent Absent    WBCs/high power field None None   CBC (+ platelets, no diff)    Collection Time: 10/25/24  9:03 PM   Result Value Ref Range    WBC Count 7.6 4.0 - 11.0 10e3/uL    RBC Count 4.47 3.80 - 5.20 10e6/uL    Hemoglobin 12.3 11.7 - 15.7 g/dL    Hematocrit 37.1 35.0 - 47.0 %    MCV 83 78 - 100 fL    MCH 27.5 26.5 - 33.0 pg    MCHC 33.2 31.5 - 36.5 g/dL    RDW 13.5  10.0 - 15.0 %    Platelet Count 218 150 - 450 10e3/uL   Basic metabolic panel    Collection Time: 10/25/24  9:03 PM   Result Value Ref Range    Sodium 141 135 - 145 mmol/L    Potassium 3.7 3.4 - 5.3 mmol/L    Chloride 105 98 - 107 mmol/L    Carbon Dioxide (CO2) 23 22 - 29 mmol/L    Anion Gap 13 7 - 15 mmol/L    Urea Nitrogen 12.1 6.0 - 20.0 mg/dL    Creatinine 0.73 0.51 - 0.95 mg/dL    GFR Estimate >90 >60 mL/min/1.73m2    Calcium 8.9 8.8 - 10.4 mg/dL    Glucose 89 70 - 99 mg/dL   Hepatic function panel    Collection Time: 10/25/24  9:03 PM   Result Value Ref Range    Protein Total 7.4 6.4 - 8.3 g/dL    Albumin 4.6 3.5 - 5.2 g/dL    Bilirubin Total <0.2 <=1.2 mg/dL    Alkaline Phosphatase 55 40 - 150 U/L    AST 13 0 - 45 U/L    ALT 13 0 - 50 U/L    Bilirubin Direct <0.20 0.00 - 0.30 mg/dL   Lipase    Collection Time: 10/25/24  9:03 PM   Result Value Ref Range    Lipase 39 13 - 60 U/L   Troponin T, High Sensitivity    Collection Time: 10/25/24  9:03 PM   Result Value Ref Range    Troponin T, High Sensitivity <6 <=14 ng/L   D dimer quantitative    Collection Time: 10/25/24  9:03 PM   Result Value Ref Range    D-Dimer Quantitative <0.27 0.00 - 0.50 ug/mL FEU   ECG 12-LEAD WITH MUSE ([facility])    Collection Time: 10/28/24  6:07 PM   Result Value Ref Range    Systolic Blood Pressure  mmHg    Diastolic Blood Pressure  mmHg    Ventricular Rate 85 BPM    Atrial Rate 85 BPM    IN Interval 172 ms    QRS Duration 84 ms     ms    QTc 423 ms    P Axis 51 degrees    R AXIS 44 degrees    T Axis 49 degrees    Interpretation ECG       Sinus rhythm  Normal ECG  When compared with ECG of 25-Oct-2024 20:28,  No significant change was found  Confirmed by SEE ED PROVIDER NOTE FOR, ECG INTERPRETATION ([id number]),  TERESA SNOWDEN ([id number]) on 10/30/2024 2:27:35 AM     Neisseria gonorrhoeae PCR    Collection Time: 10/31/24  1:13 PM    Specimen: Urine, Voided   Result Value Ref Range    Neisseria gonorrhoeae Negative Negative   Chlamydia  trachomatis PCR    Collection Time: 10/31/24  1:13 PM    Specimen: Urine, Voided   Result Value Ref Range    Chlamydia trachomatis Negative Negative   TSH with free T4 reflex    Collection Time: 10/31/24  2:11 PM   Result Value Ref Range    TSH 1.86 0.30 - 4.20 uIU/mL   Lipid panel reflex to direct LDL    Collection Time: 10/31/24  2:11 PM   Result Value Ref Range    Cholesterol 135 <200 mg/dL    Triglycerides 166 (H) <150 mg/dL    Direct Measure HDL 40 (L) >=50 mg/dL    LDL Cholesterol Calculated 62 <100 mg/dL    Non HDL Cholesterol 95 <130 mg/dL    Patient Fasting > 8hrs? Unknown    HIV Antigen Antibody Combo Cascade    Collection Time: 10/31/24  2:11 PM   Result Value Ref Range    HIV Antigen Antibody Combo Nonreactive Nonreactive   Treponema Abs w Reflex to RPR and Titer    Collection Time: 10/31/24  2:11 PM   Result Value Ref Range    Treponema Antibody Total Nonreactive Nonreactive   UA with Microscopic reflex to Culture    Collection Time: 11/04/24 12:25 PM    Specimen: Urine, Clean Catch   Result Value Ref Range    Color Urine Yellow Colorless, Straw, Light Yellow, Yellow    Appearance Urine Slightly Cloudy (A) Clear    Glucose Urine Negative Negative mg/dL    Bilirubin Urine Negative Negative    Ketones Urine Negative Negative mg/dL    Specific Gravity Urine >1.030 1.003 - 1.035    Blood Urine Negative Negative    pH Urine 6.0 5.0 - 7.0    Protein Albumin Urine 30 (A) Negative mg/dL    Urobilinogen Urine 0.2 0.2, 1.0 mg/dL    Nitrite Urine Negative Negative    Leukocyte Esterase Urine Trace (A) Negative    Mucus Urine Present (A) None Seen /LPF    RBC Urine 5 (H) <=2 /HPF    WBC Urine 9 (H) <=5 /HPF    Squamous Epithelials Urine 32 (H) <=1 /HPF   HCG qualitative urine    Collection Time: 11/04/24 12:25 PM   Result Value Ref Range    hCG Urine Qualitative Negative Negative   Comprehensive metabolic panel    Collection Time: 11/04/24 12:58 PM   Result Value Ref Range    Sodium 139 135 - 145 mmol/L     Potassium 4.0 3.4 - 5.3 mmol/L    Carbon Dioxide (CO2) 21 (L) 22 - 29 mmol/L    Anion Gap 11 7 - 15 mmol/L    Urea Nitrogen 11.5 6.0 - 20.0 mg/dL    Creatinine 0.76 0.51 - 0.95 mg/dL    GFR Estimate >90 >60 mL/min/1.73m2    Calcium 9.7 8.8 - 10.4 mg/dL    Chloride 107 98 - 107 mmol/L    Glucose 93 70 - 99 mg/dL    Alkaline Phosphatase 59 40 - 150 U/L    AST 12 0 - 45 U/L    ALT 13 0 - 50 U/L    Protein Total 7.6 6.4 - 8.3 g/dL    Albumin 4.6 3.5 - 5.2 g/dL    Bilirubin Total 0.2 <=1.2 mg/dL   Lipase    Collection Time: 11/04/24 12:58 PM   Result Value Ref Range    Lipase 29 13 - 60 U/L   CBC with platelets and differential    Collection Time: 11/04/24 12:58 PM   Result Value Ref Range    WBC Count 8.1 4.0 - 11.0 10e3/uL    RBC Count 4.47 3.80 - 5.20 10e6/uL    Hemoglobin 12.6 11.7 - 15.7 g/dL    Hematocrit 35.9 35.0 - 47.0 %    MCV 80 78 - 100 fL    MCH 28.2 26.5 - 33.0 pg    MCHC 35.1 31.5 - 36.5 g/dL    RDW 13.0 10.0 - 15.0 %    Platelet Count 238 150 - 450 10e3/uL    % Neutrophils 69 %    % Lymphocytes 25 %    % Monocytes 5 %    % Eosinophils 1 %    % Basophils 1 %    % Immature Granulocytes 0 %    NRBCs per 100 WBC 0 <1 /100    Absolute Neutrophils 5.6 1.6 - 8.3 10e3/uL    Absolute Lymphocytes 2.0 0.8 - 5.3 10e3/uL    Absolute Monocytes 0.4 0.0 - 1.3 10e3/uL    Absolute Eosinophils 0.0 0.0 - 0.7 10e3/uL    Absolute Basophils 0.1 0.0 - 0.2 10e3/uL    Absolute Immature Granulocytes 0.0 <=0.4 10e3/uL    Absolute NRBCs 0.0 10e3/uL   Valproic acid    Collection Time: 11/10/24  8:33 AM   Result Value Ref Range    Valproic acid 41.9 (L)   ug/mL   UA with Microscopic reflex to Culture    Collection Time: 11/11/24  1:29 PM    Specimen: Urine, Midstream   Result Value Ref Range    Color Urine Yellow Colorless, Straw, Light Yellow, Yellow    Appearance Urine Clear Clear    Glucose Urine Negative Negative mg/dL    Bilirubin Urine Negative Negative    Ketones Urine 10 (A) Negative mg/dL    Specific Gravity Urine 1.029 1.003  - 1.035    Blood Urine Negative Negative    pH Urine 6.0 5.0 - 7.0    Protein Albumin Urine Negative Negative mg/dL    Urobilinogen Urine Normal Normal, 2.0 mg/dL    Nitrite Urine Negative Negative    Leukocyte Esterase Urine Negative Negative    Mucus Urine Present (A) None Seen /LPF    RBC Urine 0 <=2 /HPF    WBC Urine 1 <=5 /HPF    Squamous Epithelials Urine <1 <=1 /HPF            Precautions:     Behavioral Orders   Procedures    Assault precautions    Code 1 - Restrict to Unit    Discontinue 1:1 attendant for suicide risk     Order Specific Question:   I have performed an in person assessment of the patient     Answer:   Based on this assessment the patient no longer requires a one on one attendant at this point in time.     Order Specific Question:   Rationale     Answer:   Medical Record Reviewed     Order Specific Question:   Rationale     Answer:   Modifications to care environment made to mitigate safety risk     Order Specific Question:   Rationale     Answer:   Routine observations are sufficient to monitor safety.     Order Specific Question:   Rationale     Answer:   Response to medication    Routine Programming     As clinically indicated    Status 15     Every 15 minutes.    Status Individual Observation     Patient SIO status reviewed with team/RN.  Please also refer to RN/team documentation for add'l detail.    -SIO staff to monitor following which have contributed to patient being on SIO:  Patient is not stable and is at high risk for self harm.   -Possible interventions SIO staff could use to support patient's treatment progress:  Offer coping skills or medications.   -When following observed, team will review discontinuation of SIO:  Terminate when patient is able to follow direction and maintain safety.     Order Specific Question:   CONTINUOUS 24 hours / day     Answer:   Other     Order Specific Question:   Specify distance     Answer:   10 feet     Order Specific Question:   Indications for  SIO     Answer:   Self-injury risk    Suicide precautions: Suicide Risk: HIGH     Patients on Suicide Precautions should have a Combination Diet ordered that includes a Diet selection(s) AND a Behavioral Tray selection for Safe Tray - with utensils, or Safe Tray - NO utensils       Order Specific Question:   Suicide Risk     Answer:   HIGH            Psychiatric Examination:   Temp: 96.9  F (36.1  C) Temp src: Temporal BP: 117/78 Pulse: 77   Resp: 16 SpO2: 100 % O2 Device: None (Room air)    Weight is 235 lbs 0 oz  Body mass index is 41.63 kg/m .    Appearance: awake, alert and adequately groomed  Attitude:  cooperative  Eye Contact:  good  Mood:  anxious and depressed  Affect:  intensity is flat  Speech:  clear, coherent  Psychomotor Behavior:  no evidence of tardive dyskinesia, dystonia, or tics  Throught Process:  linear  Associations:  no loose associations  Thought Content:  no evidence of suicidal ideation or homicidal ideation, no auditory hallucinations present, and no visual hallucinations present  Insight:  limited  Judgement:  limited  Oriented to:  time, person, and place  Attention Span and Concentration:  limited  Recent and Remote Memory:  limited         Precautions:     Behavioral Orders   Procedures    Assault precautions    Code 1 - Restrict to Unit    Discontinue 1:1 attendant for suicide risk     Order Specific Question:   I have performed an in person assessment of the patient     Answer:   Based on this assessment the patient no longer requires a one on one attendant at this point in time.     Order Specific Question:   Rationale     Answer:   Medical Record Reviewed     Order Specific Question:   Rationale     Answer:   Modifications to care environment made to mitigate safety risk     Order Specific Question:   Rationale     Answer:   Routine observations are sufficient to monitor safety.     Order Specific Question:   Rationale     Answer:   Response to medication    Routine Programming      As clinically indicated    Status 15     Every 15 minutes.    Status Individual Observation     Patient SIO status reviewed with team/RN.  Please also refer to RN/team documentation for add'l detail.    -SIO staff to monitor following which have contributed to patient being on SIO:  Patient is not stable and is at high risk for self harm.   -Possible interventions SIO staff could use to support patient's treatment progress:  Offer coping skills or medications.   -When following observed, team will review discontinuation of SIO:  Terminate when patient is able to follow direction and maintain safety.     Order Specific Question:   CONTINUOUS 24 hours / day     Answer:   Other     Order Specific Question:   Specify distance     Answer:   10 feet     Order Specific Question:   Indications for SIO     Answer:   Self-injury risk    Suicide precautions: Suicide Risk: HIGH     Patients on Suicide Precautions should have a Combination Diet ordered that includes a Diet selection(s) AND a Behavioral Tray selection for Safe Tray - with utensils, or Safe Tray - NO utensils       Order Specific Question:   Suicide Risk     Answer:   HIGH          DIagnoses:   Borderline personality disorder  Intellectual disability  Suicidal ideation  Bipolar affective disorder, per history  PTSD  Psychogenic nonepileptic seizures         Plan:   Medications:    --Abilify Aristada intramuscular injection 882 mg every 28 days.  Next dose should be on 11/29  --Clonidine 0.1 mg at bedtime  -- Depakote, 250 mg twice a day  -- Decrease Prozac to 60 mg, it might be increasing the impulsivity  -- Zyprexa, 5 mg at bedtime  -- Trazodone, 100 mg at bedtime  -- Hydroxyzine and Zyprexa are available as needed    Medical:  --Internal medicine to follow up for medical problems     --Lab work was reviewed.  Urinalysis is abnormal.  The rest of the lab work is WNL.  --Valproic acid 41.9  --Urine culture was ordered    Consults:   --Care was coordinated with the  treatment team.   --The patient was consulted on nature of illness and treatment options.     --SIO for safety     Disposition Plan   Reason for ongoing admission: poses an imminent risk to self  Discharge location:  TBD  Discharge Medications: not ordered  Follow-up Appointments: not scheduled  Legal Status: voluntary   Discharge will be granted once symptoms improved.    Contacts:  Medication Management/Psychiatry:  Name/Clinic: LINN Tong CNP at [facility]. Last visit was 11/6/24  Number: [phone]     Therapist:   Name/Clinic: Lynne Sawyer at [facility]   Number: [phone]     /ACT Team:  Name/Clinic: Pau Buck at [place]    Number:      Other contact information (family, friends, SO) and DANDY status: Juan Ramon Rossle (Mother) [phone]      Adrienne ESTEVES CNP    This note was created with the help of Dragon dictation system. All grammatical/typing errors or context distortion are unintentional and inherent to software.

## 2024-11-12 NOTE — PLAN OF CARE
Team Note Due:  Monday  Assessment/Intervention/Current Symptoms and Care Coordination  Chart review and met with team, discussed pt progress, symptomology, and response to treatment.  Discussed the discharge plan and any potential impediments to discharge.    CTC completed acuity rating     Discharge Plan or Goal  Pending stabilization & development of a safe discharge plan.    Dispo Plan: Still assessing  Discharge Date/ time: TBD  Transportation: TBD  Provisional Discharge: Yes[] No[x]    Barriers to Discharge   Patient requires further psychiatric stabilization due to current symptomology    Referral Status  Still assessing    Legal Status  Patient is voluntary          Contacts:  Medication Management/Psychiatry:  Name/Clinic: LINN Tong CNP at [facility]. Last visit was 11/6/24  Number: [phone]     Therapist:   Name/Clinic: Lynne Sawyer at [facility]   Number: [phone]     /ACT Team:  Name/Clinic: Pau Buck at [place]    Number:      Other contact information (family, friends, SO) and DANDY status: Yarely Ross (Mother) [phone]       Upcoming Meetings and Dates/Important Information and next steps:  CTC will coordinate with treatment team, community supports, and pt to establish safe discharge plan

## 2024-11-13 PROCEDURE — 128N000002 HC R&B CD/MH ADOLESCENT

## 2024-11-13 PROCEDURE — 97150 GROUP THERAPEUTIC PROCEDURES: CPT | Mod: GO

## 2024-11-13 PROCEDURE — 250N000013 HC RX MED GY IP 250 OP 250 PS 637: Performed by: CLINICAL NURSE SPECIALIST

## 2024-11-13 PROCEDURE — 99232 SBSQ HOSP IP/OBS MODERATE 35: CPT | Performed by: CLINICAL NURSE SPECIALIST

## 2024-11-13 PROCEDURE — 250N000013 HC RX MED GY IP 250 OP 250 PS 637: Performed by: PSYCHIATRY & NEUROLOGY

## 2024-11-13 PROCEDURE — 250N000013 HC RX MED GY IP 250 OP 250 PS 637: Performed by: NURSE PRACTITIONER

## 2024-11-13 PROCEDURE — 250N000013 HC RX MED GY IP 250 OP 250 PS 637: Performed by: EMERGENCY MEDICINE

## 2024-11-13 RX ORDER — IBUPROFEN 400 MG/1
400 TABLET, FILM COATED ORAL EVERY 6 HOURS PRN
Status: DISCONTINUED | OUTPATIENT
Start: 2024-11-13 | End: 2024-11-22 | Stop reason: HOSPADM

## 2024-11-13 RX ADMIN — DICYCLOMINE HYDROCHLORIDE 20 MG: 20 TABLET ORAL at 08:19

## 2024-11-13 RX ADMIN — Medication 25 MCG: at 08:17

## 2024-11-13 RX ADMIN — IBUPROFEN 400 MG: 400 TABLET, FILM COATED ORAL at 18:35

## 2024-11-13 RX ADMIN — OLANZAPINE 5 MG: 5 TABLET, FILM COATED ORAL at 19:33

## 2024-11-13 RX ADMIN — OLANZAPINE 5 MG: 5 TABLET, FILM COATED ORAL at 13:02

## 2024-11-13 RX ADMIN — DIVALPROEX SODIUM 250 MG: 250 TABLET, FILM COATED, EXTENDED RELEASE ORAL at 08:18

## 2024-11-13 RX ADMIN — ACETAMINOPHEN 650 MG: 325 TABLET, FILM COATED ORAL at 14:09

## 2024-11-13 RX ADMIN — PANTOPRAZOLE SODIUM 40 MG: 20 TABLET, DELAYED RELEASE ORAL at 08:18

## 2024-11-13 RX ADMIN — DOCUSATE SODIUM 100 MG: 100 CAPSULE, LIQUID FILLED ORAL at 08:17

## 2024-11-13 RX ADMIN — FLUOXETINE HYDROCHLORIDE 60 MG: 40 CAPSULE ORAL at 08:17

## 2024-11-13 RX ADMIN — CLONIDINE HYDROCHLORIDE 0.1 MG: 0.1 TABLET ORAL at 19:40

## 2024-11-13 RX ADMIN — CETIRIZINE HYDROCHLORIDE 10 MG: 10 TABLET, FILM COATED ORAL at 08:17

## 2024-11-13 RX ADMIN — POLYETHYLENE GLYCOL 3350 17 G: 17 POWDER, FOR SOLUTION ORAL at 08:19

## 2024-11-13 RX ADMIN — LEVOTHYROXINE SODIUM 25 MCG: 25 TABLET ORAL at 08:16

## 2024-11-13 RX ADMIN — DIVALPROEX SODIUM 250 MG: 250 TABLET, FILM COATED, EXTENDED RELEASE ORAL at 19:33

## 2024-11-13 RX ADMIN — Medication 1 TABLET: at 08:16

## 2024-11-13 RX ADMIN — TRAZODONE HYDROCHLORIDE 100 MG: 100 TABLET ORAL at 19:39

## 2024-11-13 RX ADMIN — MICONAZOLE NITRATE: 20 POWDER TOPICAL at 19:33

## 2024-11-13 RX ADMIN — MICONAZOLE NITRATE: 20 POWDER TOPICAL at 12:38

## 2024-11-13 RX ADMIN — FAMOTIDINE 20 MG: 20 TABLET ORAL at 19:39

## 2024-11-13 RX ADMIN — DICYCLOMINE HYDROCHLORIDE 20 MG: 20 TABLET ORAL at 19:33

## 2024-11-13 RX ADMIN — DOCUSATE SODIUM 100 MG: 100 CAPSULE, LIQUID FILLED ORAL at 19:33

## 2024-11-13 ASSESSMENT — ACTIVITIES OF DAILY LIVING (ADL)
ADLS_ACUITY_SCORE: 0
HYGIENE/GROOMING: INDEPENDENT
ADLS_ACUITY_SCORE: 0
ADLS_ACUITY_SCORE: 28.5
LAUNDRY: UNABLE TO COMPLETE
ADLS_ACUITY_SCORE: 0
ORAL_HYGIENE: INDEPENDENT
ADLS_ACUITY_SCORE: 0
ADLS_ACUITY_SCORE: 28.5
ADLS_ACUITY_SCORE: 0
ADLS_ACUITY_SCORE: 28.5
ADLS_ACUITY_SCORE: 0
ADLS_ACUITY_SCORE: 28.5
ADLS_ACUITY_SCORE: 0
ADLS_ACUITY_SCORE: 0
ADLS_ACUITY_SCORE: 28.5
DRESS: SCRUBS (BEHAVIORAL HEALTH);INDEPENDENT
ADLS_ACUITY_SCORE: 0

## 2024-11-13 NOTE — PLAN OF CARE
BEH IP Unit Acuity Rating Score (UARS)  Patient is given one point for every criteria they meet.    CRITERIA SCORING   On a 72 hour hold, court hold, committed, stay of commitment, or revocation. 0    Patient LOS on BEH unit exceeds 20 days. 0  LOS: 5   Patient under guardianship, 55+, otherwise medically complex, or under age 11. 0   Suicide ideation without relief of precipitating factors. 1   Current plan for suicide. 0   Current plan for homicide. 0   Imminent risk or actual attempt to seriously harm another without relief of factors precipitating the attempt. 0   Severe dysfunction in daily living (ex: complete neglect for self care, extreme disruption in vegetative function, extreme deterioration in social interactions). 1   Recent (last 7 days) or current physical aggression in the ED or on unit. 0   Restraints or seclusion episode in past 72 hours. 0   Recent (last 7 days) or current verbal aggression, agitation, yelling, etc., while in the ED or unit. 0   Active psychosis. 0   Need for constant or near constant redirection (from leaving, from others, etc).  0   Intrusive or disruptive behaviors. 0   Patient requires 3 or more hours of individualized nursing care per 8-hour shift (i.e. for ADLs, meds, therapeutic interventions). 1   TOTAL 3

## 2024-11-13 NOTE — PLAN OF CARE
"  Problem: Sleep Disturbance  Goal: Adequate Sleep/Rest  Outcome: Not Progressing   Goal Outcome Evaluation:     Pt appeared to have slept for 5 hours.  Respirations are even and unlabored.  No medical/safety/behavioral concerns this shift.  No prn administered.  Pt remain on SI/assault/seizure precautions.  Pt stated she had pain but declined Tylenol stating \"I want something stronger\".  Pt was encouraged to discuss this with her provider.  Pt was up for a little bit during the night but went back to sleep without further concerns.  Remains on SIO for safety.                 "

## 2024-11-13 NOTE — PLAN OF CARE
Problem: Depressive Symptoms  Goal: Depressive Symptoms  Description: Signs and symptoms of listed problems will be absent or manageable.  Outcome: Progressing  Goal: Social and Therapeutic (Depression)  Description: Signs and symptoms of listed problems will be absent or manageable.  Outcome: Progressing     Problem: Anxiety Signs/Symptoms  Goal: Optimized Energy Level (Anxiety Signs/Symptoms)  Outcome: Progressing   Goal Outcome Evaluation:    Pt was up early, active and social in the milieu.  Pt was alert and oriented x 4, pleasant and cooperative with staff. VS stable. Affect is labile. Pt reports okay appetite, ate about 100% of dinner. Pt was medication compliant, denied pain, medication side effects and medical concerns.    Pt checked in as doing okay, rated anxiety at 9 and depression at 9 with 10 being worst. Pt rated overall mood at okay.  Pt endorses chronic SI/SIB but declined HI. Pt reported hx of urges to harm self by biting hand. Pt denies visual and auditory hallucinations.     Later in the evening, the pt was observed crying in their room. Staff attempted to assess potential triggers, and the resident reported feeling overwhelmed and having thoughts of self-harm. The pt also expressed feeling very aggressive, though not towards any individuals. They requested and received a 5 mg PRN dose of Olanzapine for agitation. No behavioral or safety concerns were noted during the interaction.

## 2024-11-13 NOTE — PLAN OF CARE
"Team Note Due:  Monday  Assessment/Intervention/Current Symptoms and Care Coordination  Chart review and met with team, discussed pt progress, symptomology, and response to treatment.  Discussed the discharge plan and any potential impediments to discharge.    Received VM from Lucina with [facility] ([facility]) to update and coordinate care.  Returned call and was advised that pt left the group home 2 months ago to move in with her boyfriend.  Pt has not experienced stable location for quite some time.  Advised that approximately 2 weeks ago,  met to decided on placement, which is still in process at this time.    Met with pt who states, \"Not doing good at all, I'm tired of struggling, I'm done.\"  Pt requests, when ready for discharge, to discharge home with mother until her , Pau, finds her a one-bedroom apartment to live in with staff.     Discharge Plan or Goal  Pending stabilization & development of a safe discharge plan.      Barriers to Discharge   Patient requires further psychiatric stabilization due to current symptomology    Referral Status  No new referrals made.    Legal Status  Patient is voluntary          Contacts:  Medication Management/Psychiatry:  Name/Clinic: LINN Tong CNP at [facility]. Last visit was 11/6/24  Number: [phone]     Therapist:   Name/Clinic: Lynne Sawyer at [facility]   Number: [phone]     /ACT Team:  Name/Clinic: EMIGDIO Maurice Walyndsay Worker at [place]    Number: [phone]    Lucina with [facility] ([facility])  [phone]     Other contact information (family, friends, SO) and DANDY status: Yarely Ross (Mother) [phone]       Upcoming Meetings and Dates/Important Information and next steps:  CTC will coordinate with treatment team, community supports, and pt to establish safe discharge plan           "

## 2024-11-13 NOTE — PROGRESS NOTES
Rehab Group    Start time: 1015  End time: 1145  Patient time total: 25 minutes    attended partial group    #6 attended   Group Type: occupational therapy and OT Clinic   Group Topic Covered: activity therapy, coping skills, and problem solving       Group Session Detail:  OT Clinic     Patient Response/Contribution:  worked intermittently, required repetition of directions, required encouragement to attend group, and unable to sequence the task       Patient Detail:    Pt actively participated in occupational therapy clinic to facilitate coping skill exploration, creative expression within personally meaningful activities, and clinical observation of social, cognitive, and kinesthetic performance skills. Pt response: Needed much encouragement and moderate assist  to initiate in group.  Needed almost 1:1 assistance to gather materials, sequence, and adjust to workspace demands as needed. Demonstrated limited focus, planning, and problem solving for selected fuse bead task. Pt will continue to be encouraged to attend groups for further asssesssment and to address goals identified on plan of care.       [id number] OT Group (2 or more in attendance)      Patient Active Problem List   Diagnosis    MENTAL HEALTH    Suicidal ideation    Suicidal ideations    Intellectual disability    Bipolar affective disorder, currently depressed, moderate (H)    Borderline personality disorder (H)    Morbid obesity (H)    Unspecified mood (affective) disorder (H)    Chronic constipation    History of suicide attempt    Hyperhidrosis    Major depressive disorder, recurrent, in full remission (H)    Vitamin D deficiency    Syncope    Seizure-like activity (H)    Syncope, unspecified syncope type    Bipolar affective disorder (H)    Has access to planned means of suicide

## 2024-11-13 NOTE — PROGRESS NOTES
"[facility], [place]   Psychiatric Progress Note        Interim History:   The patient's care was discussed with the treatment team during the daily team meeting and/or staff's chart notes were reviewed.  Staff report patient has been cooperative with taking her medications.     Psychiatric symptoms and interventions:     Patient was tearful and said, \"I don't want to live any more\". Patient said it is hard to be in the hospital. Patient said she has urges to self harm. She pointed to a small area on her wrist where she scratched herself.     Provider discussed SIO in treatment team. Patient will remain on SIO due to safety concerns.     Patient has poor coping skills . Patient would benefit from meeting with unit therapist to review coping skills Patient reports she does not have interest in groups. Patient said art group is the only group she will go to.     Provider dicussed medications with patient. Prozac was decreased to to concern it may be contributing to her impulsivity.     Patient has adequate sleep and appetite.          Medications:     Current Facility-Administered Medications   Medication Dose Route Frequency Provider Last Rate Last Admin    [START ON 11/29/2024] ARIPiprazole lauroxil ER (ARISTADA) intra-muscular 882 mg  882 mg Intramuscular Q28 Days Adrienne Tejada APRN CNP        cetirizine (zyrTEC) tablet 10 mg  10 mg Oral Daily Ambrocio Ferro MD   10 mg at 11/13/24 0817    cloNIDine (CATAPRES) tablet 0.1 mg  0.1 mg Oral At Bedtime Mara Burrows APRN CNP   0.1 mg at 11/12/24 1944    dicyclomine (BENTYL) tablet 20 mg  20 mg Oral BID Ambrocio Ferro MD   20 mg at 11/13/24 0819    divalproex sodium extended-release (DEPAKOTE ER) 24 hr tablet 250 mg  250 mg Oral BID Ambrocio Ferro MD   250 mg at 11/13/24 0818    docusate sodium (COLACE) capsule 100 mg  100 mg Oral BID Ambrocio Ferro MD   100 mg at 11/13/24 0817    famotidine (PEPCID) tablet 20 mg  20 mg Oral " "At Bedtime Ambrocio Ferro MD   20 mg at 11/12/24 1945    FLUoxetine (PROzac) capsule 60 mg  60 mg Oral Daily Adrienne Tejada APRN CNP   60 mg at 11/13/24 0817    levothyroxine (SYNTHROID/LEVOTHROID) tablet 25 mcg  25 mcg Oral Daily Ambrocio Ferro MD   25 mcg at 11/13/24 0816    miconazole (MICATIN) 2 % powder   Topical BID Leo Lowe MD   Given at 11/12/24 1945    multivitamin w/minerals (THERA-VIT-M) tablet 1 tablet  1 tablet Oral Daily Ambrocio Ferro MD   1 tablet at 11/13/24 0816    OLANZapine (zyPREXA) tablet 5 mg  5 mg Oral At Bedtime Adrienne Tejada APRN CNP   5 mg at 11/12/24 1943    pantoprazole (PROTONIX) EC tablet 40 mg  40 mg Oral Daily Ambrocio Ferro MD   40 mg at 11/13/24 0818    polyethylene glycol (MIRALAX) Packet 17 g  17 g Oral Daily Pk Calabrese MD   17 g at 11/13/24 0819    traZODone (DESYREL) tablet 100 mg  100 mg Oral At Bedtime Ambrocio Ferro MD   100 mg at 11/12/24 1944    Vitamin D3 (CHOLECALCIFEROL) tablet 25 mcg  25 mcg Oral Daily Ambrocio Ferro MD   25 mcg at 11/13/24 0817          Allergies:     Allergies   Allergen Reactions    Penicillins Rash and Unknown          Labs:   No results found for this or any previous visit (from the past 24 hours).       Psychiatric Examination:     /75 (Patient Position: Sitting)   Pulse 82   Temp 97.3  F (36.3  C) (Oral)   Resp 16   Ht 1.6 m (5' 3\")   Wt 106.6 kg (235 lb)   LMP  (LMP Unknown)   SpO2 98%   BMI 41.63 kg/m    Weight is 235 lbs 0 oz  Body mass index is 41.63 kg/m .  Orthostatic Vitals       None              Appearance: awake, alert and adequately groomed  Attitude:  cooperative  Eye Contact:  good  Mood:  anxious and depressed  Affect:  intensity is flat  Speech:  clear, coherent  Psychomotor Behavior:  no evidence of tardive dyskinesia, dystonia, or tics  Throught Process:  linear  Associations:  no loose associations  Thought Content:  no evidence of suicidal ideation or homicidal ideation, " no auditory hallucinations present, and no visual hallucinations present  Insight:  limited  Judgement:  limited  Oriented to:  time, person, and place  Attention Span and Concentration:  limited  Recent and Remote Memory:  limited    Clinical Global Impressions  First:     Most recent:            Precautions:     Behavioral Orders   Procedures    Assault precautions    Code 1 - Restrict to Unit    Discontinue 1:1 attendant for suicide risk     Order Specific Question:   I have performed an in person assessment of the patient     Answer:   Based on this assessment the patient no longer requires a one on one attendant at this point in time.     Order Specific Question:   Rationale     Answer:   Medical Record Reviewed     Order Specific Question:   Rationale     Answer:   Modifications to care environment made to mitigate safety risk     Order Specific Question:   Rationale     Answer:   Routine observations are sufficient to monitor safety.     Order Specific Question:   Rationale     Answer:   Response to medication    Routine Programming     As clinically indicated    Status 15     Every 15 minutes.    Status Individual Observation     Patient SIO status reviewed with team/RN.  Please also refer to RN/team documentation for add'l detail.    -SIO staff to monitor following which have contributed to patient being on SIO:  Patient is not stable and is at high risk for self harm.   -Possible interventions SIO staff could use to support patient's treatment progress:  Offer coping skills or medications.   -When following observed, team will review discontinuation of SIO:  Terminate when patient is able to follow direction and maintain safety.     Order Specific Question:   CONTINUOUS 24 hours / day     Answer:   Other     Order Specific Question:   Specify distance     Answer:   10 feet     Order Specific Question:   Indications for SIO     Answer:   Self-injury risk    Suicide precautions: Suicide Risk: HIGH      Patients on Suicide Precautions should have a Combination Diet ordered that includes a Diet selection(s) AND a Behavioral Tray selection for Safe Tray - with utensils, or Safe Tray - NO utensils       Order Specific Question:   Suicide Risk     Answer:   HIGH          DIagnoses:   Suicidal ideation  Self injurious behavior  Borderline personality disorder  Intellectual disability  Bipolar affective disorder, per history  PTSD  Psychogenic nonepileptic seizures            Plan:     Legal status: Voluntary     Medication management:   --Abilify Aristada intramuscular injection 882 mg every 28 days.  Next dose should be on 11/29  --Clonidine 0.1 mg at bedtime  -- Depakote, 250 mg twice a day  -- Decrease Prozac to 60 mg, it might be increasing the impulsivity  -- Zyprexa, 5 mg at bedtime  -- Trazodone, 100 mg at bedtime  -- Hydroxyzine and Zyprexa are available as needed    Medical: Admit labs: unremarkable ,VPA 41.9    Behavioral/psychology/social:  Encouraged patient to attend therapeutic hospital programming as tolerated   Patient would benefit from meeting with unit therapist on coping skills   Precautions: suicide, SIB, seizure  Patient is on SIO for safety    Disposition:   Reason for continued hospitalization: Patient expressing suicidal thinking and SIB urges. She is not safe for discharge.   Stabilization with medications, provide structured and supportive environment   Discharge medications: not ordered   Discharge plan: TBD

## 2024-11-13 NOTE — PLAN OF CARE
"  Problem: Adult Inpatient Plan of Care  Goal: Plan of Care Review  Description: The Plan of Care Review/Shift note should be completed every shift.  The Outcome Evaluation is a brief statement about your assessment that the patient is improving, declining, or no change.  This information will be displayed automatically on your shift  note.  Outcome: Not Progressing   Goal Outcome Evaluation:    Patient had a mostly good day.  This morning pleasant and cooperative.  Had an episode after lunch of crying--\"I'm giving up on life\". Was upset with provider.  Thus having SI but could not elaborate a plan.  Having urges to scratch.  This writer processed with her and was given 5mg Zyprexa.      Patient calmed and has a good rest of the afernoon.    Tylenol given for back pain of 4.  However, she is requesting Ibuprofen in the future --provider notified.  "

## 2024-11-14 PROCEDURE — 90837 PSYTX W PT 60 MINUTES: CPT

## 2024-11-14 PROCEDURE — 250N000011 HC RX IP 250 OP 636: Performed by: EMERGENCY MEDICINE

## 2024-11-14 PROCEDURE — 128N000002 HC R&B CD/MH ADOLESCENT

## 2024-11-14 PROCEDURE — 99207 PR NO CHARGE LOS: CPT | Performed by: CLINICAL NURSE SPECIALIST

## 2024-11-14 PROCEDURE — 250N000013 HC RX MED GY IP 250 OP 250 PS 637: Performed by: CLINICAL NURSE SPECIALIST

## 2024-11-14 PROCEDURE — 250N000013 HC RX MED GY IP 250 OP 250 PS 637: Performed by: PSYCHIATRY & NEUROLOGY

## 2024-11-14 PROCEDURE — 97150 GROUP THERAPEUTIC PROCEDURES: CPT | Mod: GO

## 2024-11-14 PROCEDURE — 250N000013 HC RX MED GY IP 250 OP 250 PS 637: Performed by: NURSE PRACTITIONER

## 2024-11-14 PROCEDURE — 250N000013 HC RX MED GY IP 250 OP 250 PS 637: Performed by: EMERGENCY MEDICINE

## 2024-11-14 PROCEDURE — 99232 SBSQ HOSP IP/OBS MODERATE 35: CPT | Performed by: CLINICAL NURSE SPECIALIST

## 2024-11-14 RX ORDER — POLYETHYLENE GLYCOL 3350 17 G/17G
17 POWDER, FOR SOLUTION ORAL DAILY PRN
Status: DISCONTINUED | OUTPATIENT
Start: 2024-11-15 | End: 2024-11-22 | Stop reason: HOSPADM

## 2024-11-14 RX ORDER — CETIRIZINE HYDROCHLORIDE 10 MG/1
10 TABLET ORAL DAILY PRN
Status: DISCONTINUED | OUTPATIENT
Start: 2024-11-15 | End: 2024-11-22 | Stop reason: HOSPADM

## 2024-11-14 RX ADMIN — DICYCLOMINE HYDROCHLORIDE 20 MG: 20 TABLET ORAL at 19:27

## 2024-11-14 RX ADMIN — CETIRIZINE HYDROCHLORIDE 10 MG: 10 TABLET, FILM COATED ORAL at 10:03

## 2024-11-14 RX ADMIN — HYDROXYZINE HYDROCHLORIDE 50 MG: 25 TABLET, FILM COATED ORAL at 10:10

## 2024-11-14 RX ADMIN — ONDANSETRON HYDROCHLORIDE 8 MG: 4 TABLET, FILM COATED ORAL at 19:45

## 2024-11-14 RX ADMIN — TRAZODONE HYDROCHLORIDE 100 MG: 100 TABLET ORAL at 19:27

## 2024-11-14 RX ADMIN — CLONIDINE HYDROCHLORIDE 0.1 MG: 0.1 TABLET ORAL at 19:45

## 2024-11-14 RX ADMIN — DICYCLOMINE HYDROCHLORIDE 20 MG: 20 TABLET ORAL at 10:10

## 2024-11-14 RX ADMIN — Medication 25 MCG: at 10:10

## 2024-11-14 RX ADMIN — POLYETHYLENE GLYCOL 3350 17 G: 17 POWDER, FOR SOLUTION ORAL at 10:10

## 2024-11-14 RX ADMIN — DIVALPROEX SODIUM 250 MG: 250 TABLET, FILM COATED, EXTENDED RELEASE ORAL at 19:27

## 2024-11-14 RX ADMIN — MICONAZOLE NITRATE: 20 POWDER TOPICAL at 09:27

## 2024-11-14 RX ADMIN — FLUOXETINE HYDROCHLORIDE 60 MG: 40 CAPSULE ORAL at 10:10

## 2024-11-14 RX ADMIN — Medication 1 TABLET: at 10:10

## 2024-11-14 RX ADMIN — DIVALPROEX SODIUM 250 MG: 250 TABLET, FILM COATED, EXTENDED RELEASE ORAL at 10:10

## 2024-11-14 RX ADMIN — MICONAZOLE NITRATE: 20 POWDER TOPICAL at 19:57

## 2024-11-14 RX ADMIN — DOCUSATE SODIUM 100 MG: 100 CAPSULE, LIQUID FILLED ORAL at 19:27

## 2024-11-14 RX ADMIN — ACETAMINOPHEN 650 MG: 325 TABLET, FILM COATED ORAL at 10:10

## 2024-11-14 RX ADMIN — DOCUSATE SODIUM 100 MG: 100 CAPSULE, LIQUID FILLED ORAL at 10:10

## 2024-11-14 RX ADMIN — PANTOPRAZOLE SODIUM 40 MG: 20 TABLET, DELAYED RELEASE ORAL at 10:10

## 2024-11-14 RX ADMIN — OLANZAPINE 5 MG: 5 TABLET, FILM COATED ORAL at 19:27

## 2024-11-14 ASSESSMENT — ACTIVITIES OF DAILY LIVING (ADL)
ADLS_ACUITY_SCORE: 28.5
ADLS_ACUITY_SCORE: 0
ADLS_ACUITY_SCORE: 0
ADLS_ACUITY_SCORE: 28.5
ADLS_ACUITY_SCORE: 0
ADLS_ACUITY_SCORE: 28.5
ADLS_ACUITY_SCORE: 28.5
ADLS_ACUITY_SCORE: 0
ADLS_ACUITY_SCORE: 28.5
ADLS_ACUITY_SCORE: 0
ADLS_ACUITY_SCORE: 28.5
ADLS_ACUITY_SCORE: 0
ADLS_ACUITY_SCORE: 28.5
ADLS_ACUITY_SCORE: 0

## 2024-11-14 NOTE — PLAN OF CARE
BEH IP Unit Acuity Rating Score (UARS)  Patient is given one point for every criteria they meet.    CRITERIA SCORING   On a 72 hour hold, court hold, committed, stay of commitment, or revocation. 0    Patient LOS on BEH unit exceeds 20 days. 0  LOS: 6   Patient under guardianship, 55+, otherwise medically complex, or under age 11. 0   Suicide ideation without relief of precipitating factors. 1   Current plan for suicide. 0   Current plan for homicide. 0   Imminent risk or actual attempt to seriously harm another without relief of factors precipitating the attempt. 0   Severe dysfunction in daily living (ex: complete neglect for self care, extreme disruption in vegetative function, extreme deterioration in social interactions). 1   Recent (last 7 days) or current physical aggression in the ED or on unit. 0   Restraints or seclusion episode in past 72 hours. 0   Recent (last 7 days) or current verbal aggression, agitation, yelling, etc., while in the ED or unit. 0   Active psychosis. 0   Need for constant or near constant redirection (from leaving, from others, etc).  0   Intrusive or disruptive behaviors. 0   Patient requires 3 or more hours of individualized nursing care per 8-hour shift (i.e. for ADLs, meds, therapeutic interventions). 1   TOTAL 3

## 2024-11-14 NOTE — PLAN OF CARE
"Goal Outcome Evaluation:      Plan of Care Reviewed With: patient          Problem: Anxiety Signs/Symptoms  Goal: Improved Mood Symptoms (Anxiety Signs/Symptoms)  11/14/2024 1057 by Marilin Bajwa RN  Outcome: Not Progressing          Legal Status: On Commitment, [place] Order, Court Hold    Precautions:  Suicide, Assault, Seizure       Pt endorses depression 9/10, anxiety 6/10. She states she constantly has suicidal ideation, and self injurious thoughts, homicidal ideation, audio and visual hallucinations. When asked if she'll contract for safety pt states she's not sure. She continues on 1:1 for safety.     Pt initially refused to take her medications, but eventually did with plenty of encouragement. Pt observed swallowing pills and drinking water. Reports poor appetite today, and complains of stomach ache 9/10, tylenol given, somewhat effective per pt.     Pt also visibly agitated when provider's name came up stating, \"...I don't like my provider, I don't want to talk to her, I will never talk to her again...If I see her I will punch her...she made me upset yesterday..has no respect for me..\" Writer and SIO both redirected the pt. Writer stated it's not a good idea to assault anyone, and more helpful to verbalize feelings. Writer later witnessed pt shouting and yelling at the provider at the , stating, \"Get the fuck away from me...\" Writer approached pt afterwards, and tried to encourage pt to use kinder language. Writer then suggested it's ok to ask for a different provider. Pt did talk with unit therapist afterwards, and verbalized feeling better thereafter.     Pt attended part of OT group and spent the rest of the afternoon in bed.    Recommendation/Plan:   Encourage pt to do things for herself  "

## 2024-11-14 NOTE — PLAN OF CARE
"Problem: Anxiety Signs/Symptoms  Goal: Enhanced Social, Occupational or Functional Skills (Anxiety Signs/Symptoms)  Intervention: Promote Social, Occupational and Functional Ability  Recent Flowsheet Documentation  Taken 11/13/2024 1835 by Yolis Beckman RN  Trust Relationship/Rapport:   care explained   choices provided   emotional support provided   empathic listening provided   questions encouraged   questions answered   thoughts/feelings acknowledged   reassurance provided  Goal: Improved Somatic Symptoms (Anxiety Signs/Symptoms)  Outcome: Not Progressing     Pt presents as labile. She continues to state \"I want to die.\" She is tearful at times. She reports high depression and anxiety. She reports she has constant thoughts of suicide/self harm. She cannot contract for safety but reports she feels safe around staff here. SIO staff remains with pt for safety/high-risk self harm behaviors.     Around 1830 pt observed shaking in quiet room on the floor after laying on yoga mat looking at the changing colors on the ceiling. She was on the floor for ~1 minute before opening her eyes and sitting up. She was communicating with RN normally and states, \"I don't remember what happened.\" Pt also states \"I had a seizure\" and \"I was seeing things.\" She stood up and walked with RN to . She reported 9/10 headache and was given prn ibuprofen, effective, and mints for sensory support. Vitals were obtained and BP mildly elevated; afebrile. Pt took all scheduled evening medications. RN assessed rash; minimal redness on chest. Pt accepting of scheduled miconazole powder. Pt called mom later this evening as she reports mom is a major support in her life. She was calm and talkative with SIO staff the rest of the shift.    "
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
## 2024-11-14 NOTE — PLAN OF CARE
Team Note Due:  Monday  Assessment/Intervention/Current Symptoms and Care Coordination  Chart review and met with team, discussed pt progress, symptomology, and response to treatment.  Discussed the discharge plan and any potential impediments to discharge.    Called and left VM for EMIGDIO Maurice at [place], to discuss care coordination and discharge planning.         Discharge Plan or Goal  Pending stabilization & development of a safe discharge plan.      Barriers to Discharge   Patient requires further psychiatric stabilization due to current symptomology    Referral Status  No new referrals made.    Legal Status  Patient is voluntary          Contacts:  Medication Management/Psychiatry:  Name/Clinic: LINN Tong CNP at [facility]. Last visit was 11/6/24  Number: [phone]     Therapist:   Name/Clinic: Lynne Sawyer at [facility]   Number: [phone]     /ACT Team:  Name/Clinic: EMIGDIO Maurice at [place]    Number: [phone]    Lucina with [facility] ([facility])  [phone]     Other contact information (family, friends, SO) and DANDY status: Yarely Ross (Mother) [phone]       Upcoming Meetings and Dates/Important Information and next steps:  CTC to follow up with EMIGDIO Maurice at [place], to discuss care coordination and discharge planning, if no return call received.

## 2024-11-14 NOTE — PLAN OF CARE
IP Treatment Plan    Client's Name: Sadaf Ross  YOB: 2000      Treatment Plan Date: November 14, 2024      Anticipated number of sessions for this episode of care: 3-5    Current Concerns/Problem Areas:    Goal 1: Identify situations, thoughts, feelings that trigger angry feelings Identify 2 current coping skills. Learn and develop 1 additional coping skill for anxiety to improve distress tolerance and manage self through crisis Pt will learn 2-3 mindfulness or deep breathing exercises to practice regulation and develop new coping/calming interventions      Intervention(s)    Coached on coping techniques/relaxation skills to help improve distress tolerance and managing intense emotions. , Identified and practiced coping skills, Engaged in relaxation training (e.g. meditation, progressive muscle relaxation, etc.), and Explored strategies for self-soothing            Goal 2 : Pt will decrease frequency, intensity and duration of their depression to increase improvement in their daily functioning Pt will identify support system, attend at least 2 groups daily while inpatientPt will target an additional 2 coping skills from a current reported 5      Intervention(s)    Using CBT tools and instruction to improve insight, awareness, identifying unhealthy patterns and self-talk and replacing with healthier patterns and self-talk        Pt centered considerations  Pt considerations concrete thinking

## 2024-11-14 NOTE — PLAN OF CARE
"  Problem: Psychotic Symptoms  Goal: Psychotic Symptoms  Description: Signs and symptoms of listed problems will be absent or manageable.  Outcome: Not Progressing  Flowsheets (Taken 11/14/2024 7468)  Psychotic Symptoms Assessed: all  Psychotic Symptoms Present: speech   Goal Outcome Evaluation:       Approximately at 0120 pt was seen  lying on the mat by her bedside.  Pt was on the mat for less than a minute got up and stated \"I just had a \"seizure\".  Pt's SIO staff was present by pt's bedside.  Afterwards, pt was observed to be at baseline chatting with staff.  No rigidity noted during the \"seizure\" or slurred speech noted  afterwards.  Pt got up independently from the floor, walked to the bathroom and used it without staff's assistance.  Declined vitals stating \"I don't need it\".  Pt went back to bed without further concerns.  To monitor and offer support as needed.                 "

## 2024-11-14 NOTE — PLAN OF CARE
"Goal Outcome Evaluation:    Plan of Care Reviewed With: patient        Problem: Psychotic Symptoms  Goal: Psychotic Symptoms  Description: Signs and symptoms of listed problems will be absent or manageable.  Outcome: Progressing     Pt was up early, active and social in the milieu.  Pt was alert and oriented x 4, and cooperative with staff. VS stable. Affect is labile. Pt reports okay appetite, ate about 100% dinner with adequate fluid intake.     Pt checked in as doing okay, rated anxiety at 2/10 and depression at 6/10  with 10 being worst. Pt rated overall mood is depressed.   Pt denies SI/SIB/HI. Denies visual and auditory hallucinations.  Resident  took hs medications and  vomited some, provider updated, no new orders.    Pt requested and received PRN Zofran 8 mg for nausea with good effect .  Pt was medication compliant, denied pain, medication side effects and medical concerns.    /89   Pulse 78   Temp 98.1  F (36.7  C) (Temporal)   Resp 16   Ht 1.6 m (5' 3\")   Wt 106.6 kg (235 lb)   LMP  (LMP Unknown)   SpO2 97%   BMI 41.63 kg/m     "

## 2024-11-14 NOTE — PROGRESS NOTES
"[facility], [place]   Psychiatric Progress Note        Interim History:   The patient's care was discussed with the treatment team during the daily team meeting and/or staff's chart notes were reviewed.  Staff report patient has been cooperative with taking her medications.      Psychiatric symptoms and interventions:     Provider tried to meet with patient. Patient was argumentative and said f+++ you multiple times.   Provider tried to encourage patient to attend groups. Patient said she does not like groups. Patient attended OT yesterday afternoon. Patient said she will harm herself. She pointed to a small scratch on her wrist. Provider dicussed SIO with treatment team. Patient will continue with SIO.     Provider consulted with uni therapist to meet with patient to address patient's poor coping skills.     Provider has concern that patient is getting secondary gain from inpatient hospitalization. Patient is resistant to treatment.     Patent complained she is on \"too many meds\". I discontinued Pepcid since she is taking Protonix. I made Zyrtec PRN. Miralax is PRN since she is on colace. Prozac was decreased    Provider discussed medications with mother who complained patient was on \"too many medications. Mother reports that patient has been \"up and down\" when they talk on the phone.          Medications:     Current Facility-Administered Medications   Medication Dose Route Frequency Provider Last Rate Last Admin    [START ON 11/29/2024] ARIPiprazole lauroxil ER (ARISTADA) intra-muscular 882 mg  882 mg Intramuscular Q28 Days Adrienne Tejada APRN CNP        cetirizine (zyrTEC) tablet 10 mg  10 mg Oral Daily Ambrocio Ferro MD   10 mg at 11/13/24 0817    cloNIDine (CATAPRES) tablet 0.1 mg  0.1 mg Oral At Bedtime Mara Burrows APRN CNP   0.1 mg at 11/13/24 1940    dicyclomine (BENTYL) tablet 20 mg  20 mg Oral BID Ambrocio Ferro MD   20 mg at 11/13/24 1933    divalproex " "sodium extended-release (DEPAKOTE ER) 24 hr tablet 250 mg  250 mg Oral BID Ambrocio Ferro MD   250 mg at 11/13/24 1933    docusate sodium (COLACE) capsule 100 mg  100 mg Oral BID Ambrocio Ferro MD   100 mg at 11/13/24 1933    famotidine (PEPCID) tablet 20 mg  20 mg Oral At Bedtime Ambrocio Ferro MD   20 mg at 11/13/24 1939    FLUoxetine (PROzac) capsule 60 mg  60 mg Oral Daily Adrienne Tejada APRN CNP   60 mg at 11/13/24 0817    levothyroxine (SYNTHROID/LEVOTHROID) tablet 25 mcg  25 mcg Oral Daily Ambrocio Ferro MD   25 mcg at 11/13/24 0816    miconazole (MICATIN) 2 % powder   Topical BID Leo Lowe MD   Given at 11/13/24 1933    multivitamin w/minerals (THERA-VIT-M) tablet 1 tablet  1 tablet Oral Daily Ambrocio Ferro MD   1 tablet at 11/13/24 0816    OLANZapine (zyPREXA) tablet 5 mg  5 mg Oral At Bedtime Adrienne Tejada APRN CNP   5 mg at 11/13/24 1933    pantoprazole (PROTONIX) EC tablet 40 mg  40 mg Oral Daily Ambrocio Ferro MD   40 mg at 11/13/24 0818    polyethylene glycol (MIRALAX) Packet 17 g  17 g Oral Daily Pk Calabrese MD   17 g at 11/13/24 0819    traZODone (DESYREL) tablet 100 mg  100 mg Oral At Bedtime Ambrocio Ferro MD   100 mg at 11/13/24 1939    Vitamin D3 (CHOLECALCIFEROL) tablet 25 mcg  25 mcg Oral Daily Ambrocio Ferro MD   25 mcg at 11/13/24 0817          Allergies:     Allergies   Allergen Reactions    Penicillins Rash and Unknown          Labs:   No results found for this or any previous visit (from the past 24 hours).       Psychiatric Examination:     /79 (Patient Position: Sitting)   Pulse 85   Temp 98.4  F (36.9  C) (Oral)   Resp 16   Ht 1.6 m (5' 3\")   Wt 106.6 kg (235 lb)   LMP  (LMP Unknown)   SpO2 98%   BMI 41.63 kg/m    Weight is 235 lbs 0 oz  Body mass index is 41.63 kg/m .  Orthostatic Vitals       None          Appearance: awake, alert and adequately groomed  Attitude:  guarded, argumentative  Eye Contact:  intense  Mood:  anxious and " depressed  Affect:  intensity is flat  Speech:  yelled at times otherwise normal prosody.   Psychomotor Behavior:  no evidence of tardive dyskinesia, dystonia, or tics  Throught Process:  goal oriented   Associations:  no loose associations  Thought Content:  chronic suicidal ideation at baseline, chronic SIB urges at baseline, no auditory hallucinations present, and no visual hallucinations present  Insight:  limited  Judgement:  limited  Oriented to:  time, person, and place  Attention Span and Concentration:  limited  Recent and Remote Memory:  limited      Clinical Global Impressions  First:  Considering your total clinical experience with this particular patient population, how severe are the patient's symptoms at this time?: 5 (11/13/24 1515)  Compared to the patient's condition at the START of treatment, this patient's condition is: 5 (11/13/24 1515)  Most recent:  Considering your total clinical experience with this particular patient population, how severe are the patient's symptoms at this time?: 5 (11/13/24 1515)  Compared to the patient's condition at the START of treatment, this patient's condition is: 5 (11/13/24 1515)         Precautions:     Behavioral Orders   Procedures    Assault precautions    Code 1 - Restrict to Unit    Discontinue 1:1 attendant for suicide risk     Order Specific Question:   I have performed an in person assessment of the patient     Answer:   Based on this assessment the patient no longer requires a one on one attendant at this point in time.     Order Specific Question:   Rationale     Answer:   Medical Record Reviewed     Order Specific Question:   Rationale     Answer:   Modifications to care environment made to mitigate safety risk     Order Specific Question:   Rationale     Answer:   Routine observations are sufficient to monitor safety.     Order Specific Question:   Rationale     Answer:   Response to medication    Routine Programming     As clinically indicated     Seizure precautions    Self Injury Precaution     scratching    Status 15     Every 15 minutes.    Status Individual Observation     Patient SIO status reviewed with team/RN.  Please also refer to RN/team documentation for add'l detail.    -SIO staff to monitor following which have contributed to patient being on SIO:  Patient is not stable and is at high risk for self harm.   -Possible interventions SIO staff could use to support patient's treatment progress:  Offer coping skills or medications.   -When following observed, team will review discontinuation of SIO:  Terminate when patient is able to follow direction and maintain safety.     Order Specific Question:   CONTINUOUS 24 hours / day     Answer:   Other     Order Specific Question:   Specify distance     Answer:   10 feet     Order Specific Question:   Indications for SIO     Answer:   Self-injury risk    Suicide precautions: Suicide Risk: HIGH     Patients on Suicide Precautions should have a Combination Diet ordered that includes a Diet selection(s) AND a Behavioral Tray selection for Safe Tray - with utensils, or Safe Tray - NO utensils       Order Specific Question:   Suicide Risk     Answer:   HIGH          DIagnoses:   Suicidal ideation  Self injurious behavior  Borderline personality disorder  Intellectual disability  Bipolar affective disorder, per history  PTSD  Psychogenic nonepileptic seizures         Plan:     Legal status: Voluntary      Medication management:   --Abilify Aristada intramuscular injection 882 mg every 28 days.  Next dose should be on 11/29  --Clonidine 0.1 mg at bedtime  -- Depakote, 250 mg twice a day  -- Decrease Prozac to 60 mg, it might be increasing the impulsivity  -- Zyprexa, 5 mg at bedtime  -- Trazodone, 100 mg at bedtime  -- Hydroxyzine and Zyprexa are available as needed     Medical: Admit labs: unremarkable ,VPA 41.9  Ordered DISCUS due to high dose Abilify combined with Zyprexa.       Behavioral/psychology/social:  Encouraged patient to attend therapeutic hospital programming as tolerated   Patient would benefit from meeting with unit therapist on coping skills   Precautions: suicide, SIB, seizure  Patient is on SIO for safety     Disposition:   Reason for continued hospitalization: Patient expressing suicidal thinking and SIB urges. She is not safe for discharge.   Stabilization with medications, provide structured and supportive environment   Discharge medications: not ordered   Discharge plan: TBD

## 2024-11-14 NOTE — PROGRESS NOTES
Rehab Group    Start time: 1120  End time: 1205  Patient time total: 25 minutes    attended partial group    #6 attended   Group Type: occupational therapy   Group Topic Covered: coping skills     Group Session Detail:  OT clinic     Patient Response/Contribution:  cooperative with task, attentive, actively engaged, and required prompts or assistance to participate     Patient Detail:    Pt actively participated in occupational therapy clinic to facilitate coping skill exploration, creative expression within personally meaningful activities, and clinical observation of social, cognitive, and kinesthetic performance skills. Pt response: Assistance needed to initiate, gather materials, sequence, and adjust to workspace demands as needed. Attentive to task for x25 minutes until reaching task completion. Required step-by-step instructions and 1:1 assistance to sequence her task. Able to ask for assistance as needed, and occasionally socialized with peers and staff. Reported that she misses her dogs and cats. Calm and cooperative. Left group early upon completing her task and appeared pleased with how her task turned out.      [id number] OT Group (2 or more in attendance)      Patient Active Problem List   Diagnosis    MENTAL HEALTH    Suicidal ideation    Suicidal ideations    Intellectual disability    Bipolar affective disorder, currently depressed, moderate (H)    Borderline personality disorder (H)    Morbid obesity (H)    Unspecified mood (affective) disorder (H)    Chronic constipation    History of suicide attempt    Hyperhidrosis    Major depressive disorder, recurrent, in full remission (H)    Vitamin D deficiency    Syncope    Seizure-like activity (H)    Syncope, unspecified syncope type    Bipolar affective disorder (H)    Has access to planned means of suicide

## 2024-11-15 LAB — GLUCOSE BLDC GLUCOMTR-MCNC: 107 MG/DL (ref 70–99)

## 2024-11-15 PROCEDURE — 99232 SBSQ HOSP IP/OBS MODERATE 35: CPT | Performed by: CLINICAL NURSE SPECIALIST

## 2024-11-15 PROCEDURE — 90832 PSYTX W PT 30 MINUTES: CPT

## 2024-11-15 PROCEDURE — 250N000013 HC RX MED GY IP 250 OP 250 PS 637: Performed by: CLINICAL NURSE SPECIALIST

## 2024-11-15 PROCEDURE — 250N000013 HC RX MED GY IP 250 OP 250 PS 637: Performed by: EMERGENCY MEDICINE

## 2024-11-15 PROCEDURE — 128N000002 HC R&B CD/MH ADOLESCENT

## 2024-11-15 PROCEDURE — 250N000013 HC RX MED GY IP 250 OP 250 PS 637: Performed by: NURSE PRACTITIONER

## 2024-11-15 PROCEDURE — 97150 GROUP THERAPEUTIC PROCEDURES: CPT | Mod: GO

## 2024-11-15 RX ORDER — DIVALPROEX SODIUM 500 MG/1
500 TABLET, FILM COATED, EXTENDED RELEASE ORAL AT BEDTIME
Status: DISCONTINUED | OUTPATIENT
Start: 2024-11-16 | End: 2024-11-22 | Stop reason: HOSPADM

## 2024-11-15 RX ADMIN — TRAZODONE HYDROCHLORIDE 100 MG: 100 TABLET ORAL at 20:46

## 2024-11-15 RX ADMIN — DOCUSATE SODIUM 100 MG: 100 CAPSULE, LIQUID FILLED ORAL at 20:48

## 2024-11-15 RX ADMIN — FLUOXETINE HYDROCHLORIDE 60 MG: 40 CAPSULE ORAL at 08:02

## 2024-11-15 RX ADMIN — LEVOTHYROXINE SODIUM 25 MCG: 25 TABLET ORAL at 08:05

## 2024-11-15 RX ADMIN — OLANZAPINE 5 MG: 5 TABLET, FILM COATED ORAL at 20:46

## 2024-11-15 RX ADMIN — Medication 1 TABLET: at 08:03

## 2024-11-15 RX ADMIN — PANTOPRAZOLE SODIUM 40 MG: 20 TABLET, DELAYED RELEASE ORAL at 08:04

## 2024-11-15 RX ADMIN — DIVALPROEX SODIUM 250 MG: 250 TABLET, FILM COATED, EXTENDED RELEASE ORAL at 08:03

## 2024-11-15 RX ADMIN — DIVALPROEX SODIUM 250 MG: 250 TABLET, FILM COATED, EXTENDED RELEASE ORAL at 20:48

## 2024-11-15 RX ADMIN — Medication 25 MCG: at 08:44

## 2024-11-15 RX ADMIN — OLANZAPINE 10 MG: 5 TABLET, FILM COATED ORAL at 18:58

## 2024-11-15 RX ADMIN — DOCUSATE SODIUM 100 MG: 100 CAPSULE, LIQUID FILLED ORAL at 08:05

## 2024-11-15 RX ADMIN — CLONIDINE HYDROCHLORIDE 0.1 MG: 0.1 TABLET ORAL at 20:50

## 2024-11-15 RX ADMIN — DICYCLOMINE HYDROCHLORIDE 20 MG: 20 TABLET ORAL at 08:05

## 2024-11-15 RX ADMIN — IBUPROFEN 400 MG: 400 TABLET, FILM COATED ORAL at 16:01

## 2024-11-15 RX ADMIN — DICYCLOMINE HYDROCHLORIDE 20 MG: 20 TABLET ORAL at 20:49

## 2024-11-15 NOTE — PLAN OF CARE
Problem: Sleep Disturbance  Goal: Adequate Sleep/Rest  Outcome: Progressing   Goal Outcome Evaluation:       Pt appeared to hav slept for 6.25 hours.  Respirations are even and unlabored.  No medical/safety/behavioral concerns this shift.  No prn administered.

## 2024-11-15 NOTE — PROGRESS NOTES
"[facility], [place]   Psychiatric Progress Note        Interim History:   The patient's care was discussed with the treatment team during the daily team meeting and/or staff's chart notes were reviewed.  Staff report patient  has been cooperative with taking her medications.      Psychiatric symptoms and interventions:     Provider discussed SIO with treatment team. Patient will remain on 1:1 due to safety concerns. Patient has been labile. Patient has chronic SI and SIB urges. Patient attended group today.     Provider has concern that patient is getting secondary gain from inpatient hospitalization. Patient has been referring to staff as her best friends. When provider explained to patient that her discharge plan is group home , she exploded and started to swear at provider. Patient said, \"discharge me to the street.\"  Mother reports she is unable to manage patient at home.     Provider consulted with unit therapist to meet with patient to address patient's poor coping skills.     Patent complained she is on \"too many meds\". I discontinued Pepcid since she is taking Protonix. I made Zyrtec PRN. Miralax is PRN since she is on colace. Prozac was decreased. Scheduled Depakote at .      Provider discussed medications with mother who complained patient was on \"too many medications. Mother reports that patient has been \"up and down\" when they talk on the phone.            Medications:     Current Facility-Administered Medications   Medication Dose Route Frequency Provider Last Rate Last Admin    [START ON 11/29/2024] ARIPiprazole lauroxil ER (ARISTADA) intra-muscular 882 mg  882 mg Intramuscular Q28 Days Adrienne Tejada APRN CNP        cloNIDine (CATAPRES) tablet 0.1 mg  0.1 mg Oral At Bedtime Mara Burrows APRN CNP   0.1 mg at 11/14/24 1945    dicyclomine (BENTYL) tablet 20 mg  20 mg Oral BID Ambrocio Ferro MD   20 mg at 11/15/24 0805    divalproex sodium " "extended-release (DEPAKOTE ER) 24 hr tablet 250 mg  250 mg Oral BID Ambrocio Ferro MD   250 mg at 11/15/24 0803    docusate sodium (COLACE) capsule 100 mg  100 mg Oral BID Ambrocio Ferro MD   100 mg at 11/15/24 0805    FLUoxetine (PROzac) capsule 60 mg  60 mg Oral Daily Adrienne Tejada APRN CNP   60 mg at 11/15/24 0802    levothyroxine (SYNTHROID/LEVOTHROID) tablet 25 mcg  25 mcg Oral Daily Ambrocio Ferro MD   25 mcg at 11/15/24 0805    miconazole (MICATIN) 2 % powder   Topical BID Leo Lowe MD   Given at 11/14/24 1957    multivitamin w/minerals (THERA-VIT-M) tablet 1 tablet  1 tablet Oral Daily Ambrocio Ferro MD   1 tablet at 11/15/24 0803    OLANZapine (zyPREXA) tablet 5 mg  5 mg Oral At Bedtime Adrienne Tejada APRN CNP   5 mg at 11/14/24 1927    pantoprazole (PROTONIX) EC tablet 40 mg  40 mg Oral Daily Ambrocio Ferro MD   40 mg at 11/15/24 0804    traZODone (DESYREL) tablet 100 mg  100 mg Oral At Bedtime Ambrocio Ferro MD   100 mg at 11/14/24 1927    Vitamin D3 (CHOLECALCIFEROL) tablet 25 mcg  25 mcg Oral Daily Ambrocio Ferro MD   25 mcg at 11/14/24 1010          Allergies:     Allergies   Allergen Reactions    Penicillins Rash and Unknown          Labs:   No results found for this or any previous visit (from the past 24 hours).       Psychiatric Examination:     /87   Pulse 78   Temp 98.1  F (36.7  C) (Temporal)   Resp 16   Ht 1.6 m (5' 3\")   Wt 106.6 kg (235 lb)   LMP  (LMP Unknown)   SpO2 97%   BMI 41.63 kg/m    Weight is 235 lbs 0 oz  Body mass index is 41.63 kg/m .  Orthostatic Vitals       None          Appearance: awake, alert and adequately groomed  Attitude:  cooperative  Eye Contact:  good  Mood:  anxious and depressed  Affect:  intensity is flat  Speech:  clear, coherent  Psychomotor Behavior:  no evidence of tardive dyskinesia, dystonia, or tics  Throught Process:  linear  Associations:  no loose associations  Thought Content:  no evidence of suicidal ideation " or homicidal ideation, no auditory hallucinations present, and no visual hallucinations present  Insight:  limited  Judgement:  limited  Oriented to:  time, person, and place  Attention Span and Concentration:  limited  Recent and Remote Memory:  limited      Clinical Global Impressions  First:  Considering your total clinical experience with this particular patient population, how severe are the patient's symptoms at this time?: 5 (11/13/24 1515)  Compared to the patient's condition at the START of treatment, this patient's condition is: 5 (11/13/24 1515)  Most recent:  Considering your total clinical experience with this particular patient population, how severe are the patient's symptoms at this time?: 5 (11/13/24 1515)  Compared to the patient's condition at the START of treatment, this patient's condition is: 5 (11/13/24 1515)         Precautions:     Behavioral Orders   Procedures    Assault precautions    Code 1 - Restrict to Unit    DISCUS     High dose Abilify combined with Zyprexa. Need baseline    Routine Programming     As clinically indicated    Seizure precautions    Self Injury Precaution     scratching    Status 15     Every 15 minutes.    Status Individual Observation     Patient SIO status reviewed with team/RN.  Please also refer to RN/team documentation for add'l detail.    -SIO staff to monitor following which have contributed to patient being on SIO:  Patient is not stable and is at high risk for self harm.   -Possible interventions SIO staff could use to support patient's treatment progress:  Offer coping skills or medications.   -When following observed, team will review discontinuation of SIO:  Terminate when patient is able to follow direction and maintain safety.     Order Specific Question:   CONTINUOUS 24 hours / day     Answer:   Other     Order Specific Question:   Specify distance     Answer:   10 feet     Order Specific Question:   Indications for SIO     Answer:   Self-injury risk     Suicide precautions: Suicide Risk: HIGH     Patients on Suicide Precautions should have a Combination Diet ordered that includes a Diet selection(s) AND a Behavioral Tray selection for Safe Tray - with utensils, or Safe Tray - NO utensils       Order Specific Question:   Suicide Risk     Answer:   HIGH          DIagnoses:   Suicidal ideation  Self injurious behavior  Borderline personality disorder  Intellectual disability  Bipolar affective disorder, per history  PTSD  Psychogenic nonepileptic seizures         Plan:   Legal status: Voluntary      Medication management:   --Abilify Aristada intramuscular injection 882 mg every 28 days.  Next dose should be on 11/29  --Clonidine 0.1 mg at bedtime  -- Depakote 500 mg HS  -- Decrease Prozac to 60 mg, it might be increasing the impulsivity  -- Zyprexa, 5 mg at bedtime  -- Trazodone, 100 mg at bedtime  -- Hydroxyzine and Zyprexa are available as needed     Medical: Admit labs: unremarkable ,VPA 41.9  Ordered DISCUS due to high dose Abilify combined with Zyprexa.      Behavioral/psychology/social:  Encouraged patient to attend therapeutic hospital programming as tolerated   Patient would benefit from meeting with unit therapist on coping skills   Precautions: suicide, SIB, seizure  Patient is on SIO for safety     Disposition:   Reason for continued hospitalization: Patient expressing suicidal thinking and SIB urges. She is not safe for discharge.   Stabilization with medications, provide structured and supportive environment   Discharge medications: not ordered   Discharge plan: group home, mother is unable to care for patient at home.

## 2024-11-15 NOTE — PROGRESS NOTES
"Sadaf also reported that her palms are \"always sweaty\" and would like this to be shared with her provider.  "

## 2024-11-15 NOTE — CODE/RAPID RESPONSE
RRT called at 1538 for patient Sadaf Ross and arrived on unit at 1540. Patient sitting down with nursing and provider team at bedside. Patient's primary psych provider on the unit and confirmed to call a rapid and implementing plan of care.     Patient admitted for SI, and known hx of psychogenic seizures. Upon presentation patient intermittently not answering questions but able to follow commands and discuss care during episodes of diffuse shaking/rigors. Vitals were stable upon exam. Assisted patient via wheelchair back to room. Patient endorsed chest pain but answered yes to acid reflux and yes to pain with inspiration likely s/t to GERD versus MSK, patient also endorsed headache, but team confirmed patient did not fall. No interventions needed by the ICU team and will defer to the patient's primary hospital provider. No further monitoring will be conducted by the ICU team.    LINN Limon CNP  11/15/2024  3:55 PM  [place]

## 2024-11-15 NOTE — PLAN OF CARE
Problem: Adult Behavioral Health Plan of Care  Goal: Plan of Care Review  Outcome: Progressing   Goal Outcome Evaluation:    Patient up for morning routine.  Affect is mostly flat but does smile occasionally'  Good eye contact.    Continues to complain about her provider--this writer attempted to process with her.  Continues to endorse SI without a specific plan.  Also endorses chronic SIB with thoughts of scratching but has not done so thus far today.  Rates depression as 9.  When asked about anxiety--she digressed to having emesis last evening about her anxiety regarding her provider.    Ate both meals.     Attended group.    No disruptive behaviors thus far.

## 2024-11-15 NOTE — PLAN OF CARE
"Individual Therapy Note      Date of Service: November 15, 2024    Patient: Sadaf goes by \"Sadaf,\" uses she/her pronouns    Individuals Present: Sadaf Diane Scottelmer, [facility]    Session start: 1130  Session end: 1200  Session duration in minutes: 30      Modality Used: DBT    Goals: improve anger management and distress tolerance    Patient Description of current symptoms: Angry     Mental Status Exam:   Attitude: cooperative  Eye Contact: good  Mood: angry and sad   Affect: intensity is heightened  Speech: clear, coherent  Psychomotor Behavior: no evidence of tardive dyskinesia, dystonia, or tics  Thought Process:  logical  Associations: no loose associations  Thought Content: passive suicidal ideation present  Insight: fair  Judgement: fair  Attention Span and Concentration: intact    Pt progress: Patient expressed anger about her provider telling her she is going to a group home. She reported that she had a very negative experience in a group home and her mother told her she should get ut of the group home and come home. She reported that people were stealing her money and groceries and calling the  on her. Patient reported she was biting herself last night because it helps her feel calmer. She showed writer the bite makes on her hand. We talked about how things were going at home and she reported they were fine and she enjoyed being there. She got distressed when writer asked her if she can go home. She started crying and saying she missed her family. She stated that no one loves her and she wants to give up on life. We worked on checking the facts and she admitted that her mom, sister and nephew love her, even if she is not at home. We discussed what comforts her and she reported music. When she is angry she goes to the mall and takes a break from situation. She decided she wanted to nestor her mom and ask mom to visit [facility]. We worked on the skills CLARKE to practice how to talk to mom and her provider. " Writer gave patient handout on interpersonal skills and emotional regulation. We worked on reframing negative thinking and came up with a positive affirmation to tape to her mirror.    Treatment Objective(s) Addressed:   The focus of this session was on rapport building, identifying and practicing coping strategies, processing feelings related to anger and distress about the possibility of going to a group home, and identifying an appropriate aftercare plan     Progress Towards Goals and Assessment of Patient:   Patient is not making progress towards treatment goals as evidenced by anger, distress, biting self and talking about giving up on life.       Therapeutic Intervention(s):   Provided active listening, unconditional positive regard, and validation.   Coached on coping techniques/relaxation skills to help improve distress tolerance and managing intense emotions. , Explored strategies for self-soothing, Engaged in guided discovery, explored patient's perspectives and helped expand them through socratic dialogue, Taught the link between thoughts, feelings, and behaviors, Using CBT tools and instruction to improve insight, awareness, identifying unhealthy patterns and self-talk and replacing with healthier patterns and self-talk, and Introduced and practiced CLARKE      Plan/next step: Meet daily to work on coping skills    52716 - Psychotherapy (with patient) - 30 (16-37*) min    Patient Active Problem List   Diagnosis    MENTAL HEALTH    Suicidal ideation    Suicidal ideations    Intellectual disability    Bipolar affective disorder, currently depressed, moderate (H)    Borderline personality disorder (H)    Morbid obesity (H)    Unspecified mood (affective) disorder (H)    Chronic constipation    History of suicide attempt    Hyperhidrosis    Major depressive disorder, recurrent, in full remission (H)    Vitamin D deficiency    Syncope    Seizure-like activity (H)    Syncope, unspecified syncope type     Bipolar affective disorder (H)    Has access to planned means of suicide

## 2024-11-15 NOTE — PROGRESS NOTES
"DISCUS assessment completed. Score: 2. Paper copy placed in paper chart. Sadaf was engaging and friendly. She shared that at night her medications don't make her feel tired--she has trouble sleeping and medications she takes in the morning make her feel tired-\"I'm just now awake\". Reported experiencing a dry mouth \"always\" and asked if there was anything she could take for this. I referred her to her provider and reinforced approaches she's been using (sips of liquids). She reported that she's \"always shaky\". Sadaf shared that she can feel when a seizure is coming on-and that seizures make her brain hurt and she can't control what she says or thinks. She shared that she worries about her family--she doesn't want people to attack them and wants her young nephew to feel peaceful and happy. She proudly showed me a bracelet that she'd made (and wears) with positive hopeful messages she gives to herself. Reinforced this practice. Shared Areli Bridges's [place] Kindness Meditation for self practice and practice for others and to use before sleep. We also practiced Heart Focused Breathing (Heart Math) and Relaxation Response breath technique developed by Luis Daniel Eric MD. She asked for written supplementation and will provide.   "

## 2024-11-15 NOTE — PLAN OF CARE
"Goal Outcome Evaluation:      Problem: Psychotic Symptoms  Goal: Psychotic Symptoms  Description: Signs and symptoms of listed problems will be absent or manageable.  Outcome: Not Progressing     At 1530 hr staff was doing the vital signs to the patient and the patient was not responding to verbal commands, vital signs taken  T-97.2 R-18 SPO2-99%BP-139/84 P-92 . Blood sugar 107. Rapid respond called , pt was transferred to her room via wheelchair. Resident settled well, was able to respond to staff, the provider was notified, pt has a history of Psychogenic seizures. PRN ibuprofen given for generalized pain     At 1610  hr, Pt  is on SIO, at 1610 HR Pt was in her room with SIO staff  lying in bed. Res suddenly woke up and from her bed and threw herself on the floor. Nurse was called to pt room, assessed Pt,  stated \" I want to hurt myself, any part of my body\" resident was redirected and was calm. No physical injuries noted. T-97.2 R-18 SPO2-99%BP-139/84 P-92 . Pt contracted for safety and promised to talk to the SIO staff whenever she feels like hurting herself so that she can get some help. Pt was able to get up and stood up. Normal range of motion on both lower and upper extremities. Provider notified, new order to remove the bed and strip all the beddings and use only safety blanket was placed. Pt remain calm and with SIO at this time.    At 1900 hr the maintenance staff came to remove the bed per provider order, Pt was very agitated, yelling and throwing and tearing the papers in her room, Writer and other staff attempted to deescalate the pt and she was still very upset stating \" I don want a male nurses, they don't nothing, get out of my room\" pt  was still yelling loud disrupting the milieu, refused to settle, offered medications and she refused, code 21 called and the pt was cooperative after she saw the staff and security in the unit, agreed to take the prn Zyprexa and no physical hold,seclusion or " restraints performed. Bed was safely removed from her room and pt contracted for safety.      Affect is Pt reports okay appetite, ate about 100 % of dinner with adequate fluid intake     Pt checked in as doing okay, rated anxiety at 6 and depression at 8 with 10 being worst. Pt rated overall mood is anxious.  Pt denies SI/HI. Pt endorsed SI, Denies visual and auditory hallucinations.      Pt was medication compliant, denied pain, medication side effects and medical concerns.

## 2024-11-15 NOTE — PROGRESS NOTES
Rehab Group     Start time: 1015  End time: 1145  Patient time total: 40 minutes     attended partial group     #6 attended   Group Type: occupational therapy and OT Clinic   Group Topic Covered: activity therapy, coping skills, and problem solving         Group Session Detail:  OT Clinic      Patient Response/Contribution:  cooperative with task, safe use of materials/supplies, and actively engaged          Patient Detail:     Pt actively participated in occupational therapy clinic to facilitate coping skill exploration, creative expression within personally meaningful activities, and clinical observation of social, cognitive, and kinesthetic performance skills. Pt response: Needed moderate assistance to initiate task, gather materials, sequence, and adjust to workspace demands as needed. Demonstrated fair focus, but needed moderate assistance with planning, and problem solving for selected bracelet task. Able to ask for assistance as needed, and did so frequently.   Pt will continue to be encouraged to attend groups for further asssesssment and to address goals identified on plan of care.        [address] OT Group (2 or more in attendance)    Patient Active Problem List   Diagnosis    MENTAL HEALTH    Suicidal ideation    Suicidal ideations    Intellectual disability    Bipolar affective disorder, currently depressed, moderate (H)    Borderline personality disorder (H)    Morbid obesity (H)    Unspecified mood (affective) disorder (H)    Chronic constipation    History of suicide attempt    Hyperhidrosis    Major depressive disorder, recurrent, in full remission (H)    Vitamin D deficiency    Syncope    Seizure-like activity (H)    Syncope, unspecified syncope type    Bipolar affective disorder (H)    Has access to planned means of suicide

## 2024-11-15 NOTE — PLAN OF CARE
Team Note Due:  Monday  Assessment/Intervention/Current Symptoms and Care Coordination  Chart review and met with team, discussed pt progress, symptomology, and response to treatment.  Discussed the discharge plan and any potential impediments to discharge.    CTC completed acuity rating     Discharge Plan or Goal  Pending stabilization & development of a safe discharge plan.    Dispo Plan: Still assessing  Discharge Date/ time: TBD  Transportation: TBD  Provisional Discharge: Yes[] No[x]    Barriers to Discharge   Patient requires further psychiatric stabilization due to current symptomology    Referral Status  Still assessing    Legal Status  Patient is voluntary          Contacts:  Medication Management/Psychiatry:  Name/Clinic: LINN Tong CNP at [facility]. Last visit was 11/6/24  Number: [phone]     Therapist:   Name/Clinic: Lynne Sawyer at [facility]   Number: [phone]     /ACT Team:  Name/Clinic: Pau Buck at [place]    Number: [phone]       Lucina with [facility] ([facility])  [phone]    Other contact information (family, friends, SO) and DANDY status: Yarely Ross (Mother) [phone]       Upcoming Meetings and Dates/Important Information and next steps:  CTC to follow up with EMIGDIO Maurice Waiver Worker at [place], to discuss care coordination and discharge planning, if no return call received.

## 2024-11-16 VITALS
TEMPERATURE: 97.3 F | OXYGEN SATURATION: 100 % | BODY MASS INDEX: 41.64 KG/M2 | HEIGHT: 63 IN | RESPIRATION RATE: 18 BRPM | HEART RATE: 73 BPM | SYSTOLIC BLOOD PRESSURE: 127 MMHG | DIASTOLIC BLOOD PRESSURE: 82 MMHG | WEIGHT: 235 LBS

## 2024-11-16 PROCEDURE — 128N000002 HC R&B CD/MH ADOLESCENT

## 2024-11-16 PROCEDURE — 250N000013 HC RX MED GY IP 250 OP 250 PS 637: Performed by: EMERGENCY MEDICINE

## 2024-11-16 PROCEDURE — 250N000013 HC RX MED GY IP 250 OP 250 PS 637: Performed by: NURSE PRACTITIONER

## 2024-11-16 PROCEDURE — 250N000013 HC RX MED GY IP 250 OP 250 PS 637: Performed by: CLINICAL NURSE SPECIALIST

## 2024-11-16 RX ADMIN — TRAZODONE HYDROCHLORIDE 100 MG: 100 TABLET ORAL at 19:22

## 2024-11-16 RX ADMIN — DICYCLOMINE HYDROCHLORIDE 20 MG: 20 TABLET ORAL at 19:23

## 2024-11-16 RX ADMIN — CLONIDINE HYDROCHLORIDE 0.1 MG: 0.1 TABLET ORAL at 19:22

## 2024-11-16 RX ADMIN — PANTOPRAZOLE SODIUM 40 MG: 20 TABLET, DELAYED RELEASE ORAL at 07:54

## 2024-11-16 RX ADMIN — DOCUSATE SODIUM 100 MG: 100 CAPSULE, LIQUID FILLED ORAL at 07:55

## 2024-11-16 RX ADMIN — MICONAZOLE NITRATE: 20 POWDER TOPICAL at 19:23

## 2024-11-16 RX ADMIN — OLANZAPINE 5 MG: 5 TABLET, FILM COATED ORAL at 19:22

## 2024-11-16 RX ADMIN — FLUOXETINE HYDROCHLORIDE 60 MG: 40 CAPSULE ORAL at 07:54

## 2024-11-16 RX ADMIN — OLANZAPINE 10 MG: 5 TABLET, FILM COATED ORAL at 17:26

## 2024-11-16 RX ADMIN — LEVOTHYROXINE SODIUM 25 MCG: 25 TABLET ORAL at 07:53

## 2024-11-16 RX ADMIN — Medication 25 MCG: at 07:54

## 2024-11-16 RX ADMIN — Medication 1 TABLET: at 07:54

## 2024-11-16 RX ADMIN — DIVALPROEX SODIUM 500 MG: 500 TABLET, FILM COATED, EXTENDED RELEASE ORAL at 19:22

## 2024-11-16 RX ADMIN — DOCUSATE SODIUM 100 MG: 100 CAPSULE, LIQUID FILLED ORAL at 19:22

## 2024-11-16 RX ADMIN — DICYCLOMINE HYDROCHLORIDE 20 MG: 20 TABLET ORAL at 07:55

## 2024-11-16 RX ADMIN — MICONAZOLE NITRATE 1 APPLICATOR: 20 POWDER TOPICAL at 07:55

## 2024-11-16 RX ADMIN — HYDROXYZINE HYDROCHLORIDE 25 MG: 25 TABLET, FILM COATED ORAL at 10:03

## 2024-11-16 ASSESSMENT — ACTIVITIES OF DAILY LIVING (ADL)
ADLS_ACUITY_SCORE: 0
DRESS: SCRUBS (BEHAVIORAL HEALTH)
ORAL_HYGIENE: INDEPENDENT
ADLS_ACUITY_SCORE: 0
HYGIENE/GROOMING: INDEPENDENT
ADLS_ACUITY_SCORE: 0
HYGIENE/GROOMING: INDEPENDENT
ADLS_ACUITY_SCORE: 0

## 2024-11-16 NOTE — PLAN OF CARE
Problem: Depressive Symptoms  Goal: Depressive Symptoms  Description: Signs and symptoms of listed problems will be absent or manageable.  Outcome: Not Progressing   Goal Outcome Evaluation:     Plan of Care Reviewed With: patient       Pt on 1:1 d/t ANNIKA, SIB    Pt med compliant,  She endorsed anxiety 9/10 and depression 8/10; and feels the events of last evening are driving these. Pt endorsed SI without a plan. She contracted for safety. She denied HI and hallucinations. Pt ate breakfast, rested in her room, and watched others play cards this morning.     Pt ate lunch and rested in bed. Her sister visited this afternoon, they played cards and visit went well.

## 2024-11-16 NOTE — PLAN OF CARE
Goal Outcome Evaluation:    Plan of Care Reviewed With: patient      NOC Shift Report    Pt in bed at beginning of shift, breathing quiet and unlabored. Pt slept through shift. Pt slept 5.5 hours.     No pt complaints or concerns at this time.     No PRNs given. Will continue to monitor.     Precautions: on SIO 10 feet for Self injury risk   (1) body pink, extremities blue

## 2024-11-17 LAB — GLUCOSE BLDC GLUCOMTR-MCNC: 80 MG/DL (ref 70–99)

## 2024-11-17 PROCEDURE — 250N000013 HC RX MED GY IP 250 OP 250 PS 637: Performed by: EMERGENCY MEDICINE

## 2024-11-17 PROCEDURE — 128N000002 HC R&B CD/MH ADOLESCENT

## 2024-11-17 PROCEDURE — 250N000013 HC RX MED GY IP 250 OP 250 PS 637: Performed by: CLINICAL NURSE SPECIALIST

## 2024-11-17 PROCEDURE — 250N000011 HC RX IP 250 OP 636: Performed by: EMERGENCY MEDICINE

## 2024-11-17 PROCEDURE — 250N000013 HC RX MED GY IP 250 OP 250 PS 637: Performed by: NURSE PRACTITIONER

## 2024-11-17 RX ADMIN — POLYETHYLENE GLYCOL 3350 17 G: 17 POWDER, FOR SOLUTION ORAL at 10:27

## 2024-11-17 RX ADMIN — ONDANSETRON HYDROCHLORIDE 8 MG: 4 TABLET, FILM COATED ORAL at 08:16

## 2024-11-17 RX ADMIN — HYDROXYZINE HYDROCHLORIDE 50 MG: 25 TABLET, FILM COATED ORAL at 07:45

## 2024-11-17 RX ADMIN — Medication 25 MCG: at 07:42

## 2024-11-17 RX ADMIN — DOCUSATE SODIUM 100 MG: 100 CAPSULE, LIQUID FILLED ORAL at 07:42

## 2024-11-17 RX ADMIN — OLANZAPINE 5 MG: 5 TABLET, FILM COATED ORAL at 19:19

## 2024-11-17 RX ADMIN — ACETAMINOPHEN 650 MG: 325 TABLET, FILM COATED ORAL at 10:24

## 2024-11-17 RX ADMIN — DOCUSATE SODIUM 100 MG: 100 CAPSULE, LIQUID FILLED ORAL at 19:19

## 2024-11-17 RX ADMIN — DICYCLOMINE HYDROCHLORIDE 20 MG: 20 TABLET ORAL at 19:19

## 2024-11-17 RX ADMIN — Medication 1 TABLET: at 07:42

## 2024-11-17 RX ADMIN — FLUOXETINE HYDROCHLORIDE 60 MG: 40 CAPSULE ORAL at 07:42

## 2024-11-17 RX ADMIN — CLONIDINE HYDROCHLORIDE 0.1 MG: 0.1 TABLET ORAL at 19:19

## 2024-11-17 RX ADMIN — PANTOPRAZOLE SODIUM 40 MG: 20 TABLET, DELAYED RELEASE ORAL at 07:42

## 2024-11-17 RX ADMIN — DIVALPROEX SODIUM 500 MG: 500 TABLET, FILM COATED, EXTENDED RELEASE ORAL at 19:20

## 2024-11-17 RX ADMIN — TRAZODONE HYDROCHLORIDE 100 MG: 100 TABLET ORAL at 19:20

## 2024-11-17 RX ADMIN — DICYCLOMINE HYDROCHLORIDE 20 MG: 20 TABLET ORAL at 07:42

## 2024-11-17 RX ADMIN — BENZTROPINE MESYLATE 1 MG: 1 TABLET ORAL at 19:19

## 2024-11-17 RX ADMIN — MICONAZOLE NITRATE: 20 POWDER TOPICAL at 07:43

## 2024-11-17 RX ADMIN — LEVOTHYROXINE SODIUM 25 MCG: 25 TABLET ORAL at 07:42

## 2024-11-17 ASSESSMENT — ACTIVITIES OF DAILY LIVING (ADL)
ADLS_ACUITY_SCORE: 0
HYGIENE/GROOMING: INDEPENDENT;WITH SUPERVISION
ADLS_ACUITY_SCORE: 0
ORAL_HYGIENE: INDEPENDENT;WITH SUPERVISION
DRESS: SCRUBS (BEHAVIORAL HEALTH)
DRESS: SCRUBS (BEHAVIORAL HEALTH)
ADLS_ACUITY_SCORE: 0

## 2024-11-17 NOTE — PLAN OF CARE
"  Rehab Group    Start time: 11:15  End time: 12:00  Patient time total: 5 minutes    attended partial group    #6 attended   Group Type: occupational therapy   Group Topic Covered: cognitive activities, coping skills, healthy leisure time, and social skills     Group Session Detail:  Occupational therapy group was facilitated with a focus on facilitating social and leisure exploration and engagement.     Patient Response/Contribution:   left group early     Patient Detail:  Patient was accompanied to group by SIO staff. Patient stayed for introductions and check-in. Patient reported feeling grateful for: \"my nephew.\" Patient sat away from the group dynamic; despite writer's invitation and encouragement to join the other's. Patient left group prior to the start of the selected activity. No charge.       No Charge      Patient Active Problem List   Diagnosis    MENTAL HEALTH    Suicidal ideation    Suicidal ideations    Intellectual disability    Bipolar affective disorder, currently depressed, moderate (H)    Borderline personality disorder (H)    Morbid obesity (H)    Unspecified mood (affective) disorder (H)    Chronic constipation    History of suicide attempt    Hyperhidrosis    Major depressive disorder, recurrent, in full remission (H)    Vitamin D deficiency    Syncope    Seizure-like activity (H)    Syncope, unspecified syncope type    Bipolar affective disorder (H)    Has access to planned means of suicide        "

## 2024-11-17 NOTE — PLAN OF CARE
Problem: Anxiety Signs/Symptoms  Goal: Improved Somatic Symptoms (Anxiety Signs/Symptoms)  Outcome: Progressing   Goal Outcome Evaluation:     11/17/24 0600   Sleep/Rest   Sleep/Rest/Relaxation no problem identified   Sleep Hygiene Promotion awakenings minimized   Night Time # Hours 6.25 hours   Safety   Safety WDL WDL   Patient Location patient room, own   Suicidality Status 15;SIO (Status Individual Observation)  (NOTE - order will specify distance     NOC Shift Report    Pt pt on SIO for SIB, was in bed at the beginning of the shift. Breathing was regular, spontaneous, and unlabored. Pt did not present with any medical concerns this shift. There were no  PRNs given. The plan of care is ongoing.

## 2024-11-17 NOTE — PLAN OF CARE
"  Problem: Suicide Risk  Goal: Absence of Self-Harm  Description: Pt will remain safe on the unit every shift as evidenced by taking scheduled medications, checking in with her primary nurse every shift, attend 50% of programming, identify and utilize 3 skills to improve coping with stressors.  Outcome: Progressing   Goal Outcome Evaluation:     Mental Health:  Pt presents as tense anxious, requests female nurse, writer assumes pt care, pt affect brightens with engagement, endorsing depression and chronic SI thoughts with no active reports of plan or intent of self harm, reluctant to contract for safety, med compliant, reports her Mother is visiting today, pt spends alone time listening to music and taking naps.Pt cognitively low functioning, poor judgement, vulnerable and seems to do well with distraction such as art, music, humor and playfully engaging with others such as a game of cards; attends group, balances activity with rest periods. Mother and step-father visit today and visit appears to have gone well.    Medical:  Pt report headache at 10:30 following dose of zofran; they rate 6/10, radiates toward stomach, unsure of alleviating factors and writer suspects side effect of zofran; pt reports last BM last night, large and \"hard\" appetite and sleep are fair.Pt instructed to drink fluids, eat \"p\" fruits. Will monitor for GI resolution/comfort     Vitals:   /81 (BP Location: Right arm)   Pulse 80   Temp 97.5  F (36.4  C) (Temporal)   Resp 18   Ht 1.6 m (5' 3\")   Wt 106.6 kg (235 lb)   LMP  (LMP Unknown)   SpO2 98%   BMI 41.63 kg/m         PRNs Given:  Hydroxyzine 50 mg at 8 am helpful for anxiety; Zofran at 9 am helpful for GI upset.  Tylenol 650 mg and Mirilax given at 10:30 am for headache and potential constipation    Continue to assess and monitor closely, SIO 10 feet, assault, fall, seizure, suicide and self injury precautions for safety.                      "

## 2024-11-17 NOTE — PLAN OF CARE
Problem: Adult Inpatient Plan of Care  Goal: Optimal Comfort and Wellbeing  Outcome: Not Progressing     Problem: Adult Behavioral Health Plan of Care  Goal: Adheres to Safety Considerations for Self and Others  Outcome: Not Progressing     Problem: Anxiety Signs/Symptoms  Goal: Improved Mood Symptoms (Anxiety Signs/Symptoms)  Outcome: Not Progressing   Goal Outcome Evaluation:    Pt was visible in the hallway  Remains on SIO 1:1 10 feet for self injury risk    Pleasant and cooperative at check in  Blunted affect. Social with SIO staff  Pt reported not doing well, has been awake since 6 am this morning    Pt reported shaky hands which is normally sweaty though not today  BG checked-80. Pt was encouraged to eat snack/dinner when it arrives. Pt agreeable  VS-WNL except /76. Rechecked-  Meal-has not had any appetite to eat.     Headache-9/10. PRN- none given this shift  Stomach pain still present with the heartburn  Pt mentioned morning medications make her sleepy and night time keep her awake.     Pt reported anxiety of being here. Pt stated mother would like her to stay and be more stable before she goes home.   Self-harm- still active with no stated plan and pt not naeem for safety. Pt will continue to be on SIO.     1830-Pt requested nighttime medication. Tremor of the hands was still present- PRN cogentin given.   Pt was mostly withdrawn today. Went to sleep early

## 2024-11-18 PROCEDURE — 128N000002 HC R&B CD/MH ADOLESCENT

## 2024-11-18 PROCEDURE — 250N000013 HC RX MED GY IP 250 OP 250 PS 637: Performed by: CLINICAL NURSE SPECIALIST

## 2024-11-18 PROCEDURE — 250N000013 HC RX MED GY IP 250 OP 250 PS 637: Performed by: NURSE PRACTITIONER

## 2024-11-18 PROCEDURE — 99232 SBSQ HOSP IP/OBS MODERATE 35: CPT | Performed by: NURSE PRACTITIONER

## 2024-11-18 PROCEDURE — 250N000013 HC RX MED GY IP 250 OP 250 PS 637: Performed by: EMERGENCY MEDICINE

## 2024-11-18 PROCEDURE — H2032 ACTIVITY THERAPY, PER 15 MIN: HCPCS

## 2024-11-18 RX ORDER — FLUOXETINE 40 MG/1
40 CAPSULE ORAL DAILY
Status: DISCONTINUED | OUTPATIENT
Start: 2024-11-19 | End: 2024-11-22 | Stop reason: HOSPADM

## 2024-11-18 RX ADMIN — DOCUSATE SODIUM 100 MG: 100 CAPSULE, LIQUID FILLED ORAL at 08:06

## 2024-11-18 RX ADMIN — FLUOXETINE HYDROCHLORIDE 60 MG: 40 CAPSULE ORAL at 08:06

## 2024-11-18 RX ADMIN — DOCUSATE SODIUM 100 MG: 100 CAPSULE, LIQUID FILLED ORAL at 19:54

## 2024-11-18 RX ADMIN — Medication 1 TABLET: at 08:06

## 2024-11-18 RX ADMIN — DICYCLOMINE HYDROCHLORIDE 20 MG: 20 TABLET ORAL at 08:06

## 2024-11-18 RX ADMIN — PANTOPRAZOLE SODIUM 40 MG: 20 TABLET, DELAYED RELEASE ORAL at 08:06

## 2024-11-18 RX ADMIN — DICYCLOMINE HYDROCHLORIDE 20 MG: 20 TABLET ORAL at 19:54

## 2024-11-18 RX ADMIN — HYDROXYZINE HYDROCHLORIDE 50 MG: 25 TABLET, FILM COATED ORAL at 01:37

## 2024-11-18 RX ADMIN — TRAZODONE HYDROCHLORIDE 100 MG: 100 TABLET ORAL at 19:54

## 2024-11-18 RX ADMIN — CLONIDINE HYDROCHLORIDE 0.1 MG: 0.1 TABLET ORAL at 19:54

## 2024-11-18 RX ADMIN — Medication 25 MCG: at 08:06

## 2024-11-18 RX ADMIN — DIVALPROEX SODIUM 500 MG: 500 TABLET, FILM COATED, EXTENDED RELEASE ORAL at 19:54

## 2024-11-18 RX ADMIN — SENNOSIDES AND DOCUSATE SODIUM 1 TABLET: 50; 8.6 TABLET ORAL at 19:54

## 2024-11-18 RX ADMIN — HYDROXYZINE HYDROCHLORIDE 50 MG: 25 TABLET, FILM COATED ORAL at 19:05

## 2024-11-18 RX ADMIN — MICONAZOLE NITRATE: 20 POWDER TOPICAL at 08:07

## 2024-11-18 RX ADMIN — LEVOTHYROXINE SODIUM 25 MCG: 25 TABLET ORAL at 08:06

## 2024-11-18 RX ADMIN — OLANZAPINE 5 MG: 5 TABLET, FILM COATED ORAL at 19:54

## 2024-11-18 ASSESSMENT — ACTIVITIES OF DAILY LIVING (ADL)
ADLS_ACUITY_SCORE: 0
DRESS: SCRUBS (BEHAVIORAL HEALTH)
ADLS_ACUITY_SCORE: 0
HYGIENE/GROOMING: INDEPENDENT
ADLS_ACUITY_SCORE: 0
LAUNDRY: UNABLE TO COMPLETE
ORAL_HYGIENE: INDEPENDENT
ADLS_ACUITY_SCORE: 0

## 2024-11-18 NOTE — PLAN OF CARE
BEH IP Unit Acuity Rating Score (UARS)  Patient is given one point for every criteria they meet.    CRITERIA SCORING   On a 72 hour hold, court hold, committed, stay of commitment, or revocation. 0    Patient LOS on BEH unit exceeds 20 days. 0  LOS: 10   Patient under guardianship, 55+, otherwise medically complex, or under age 11. 0   Suicide ideation without relief of precipitating factors. 1   Current plan for suicide. 0   Current plan for homicide. 0   Imminent risk or actual attempt to seriously harm another without relief of factors precipitating the attempt. 0   Severe dysfunction in daily living (ex: complete neglect for self care, extreme disruption in vegetative function, extreme deterioration in social interactions). 1   Recent (last 7 days) or current physical aggression in the ED or on unit. 0   Restraints or seclusion episode in past 72 hours. 0   Recent (last 7 days) or current verbal aggression, agitation, yelling, etc., while in the ED or unit. 1-Last 11/15   Active psychosis. 0   Need for constant or near constant redirection (from leaving, from others, etc).  0   Intrusive or disruptive behaviors. 0   Patient requires 3 or more hours of individualized nursing care per 8-hour shift (i.e. for ADLs, meds, therapeutic interventions). 1   TOTAL 4

## 2024-11-18 NOTE — PROGRESS NOTES
"Rehab Group     Start time: 1015  End time: 1145  Patient time total: 45 minutes     attended partial groups     #6 attended   Group Type: music   Group Topic Covered: emotional regulation, healthy leisure time, mindfulness, and self-care      Group Session Detail:     1) Energy Boxes  2) Group Relaxation & Mindfulness      Patient Response/Contribution:  cooperative with task and actively engaged, became tearful, took break and returned to sessions       Patient Detail: Pt asked to hear the song \"Butterfly Kisses\" during morning sessions today.  As the song progressed, pt became tearful, at first appearing to have a safe and successful emotional release, but then after the song, pt went and stood in the corner with her face to the wall.  Staff helped pt take a break at that point and return to group later.  Pt appeared regulated upon re-entry to session and was able to participate again.        Activity Therapy Per 15 min ()    Patient Active Problem List   Diagnosis    MENTAL HEALTH    Suicidal ideation    Suicidal ideations    Intellectual disability    Bipolar affective disorder, currently depressed, moderate (H)    Borderline personality disorder (H)    Morbid obesity (H)    Unspecified mood (affective) disorder (H)    Chronic constipation    History of suicide attempt    Hyperhidrosis    Major depressive disorder, recurrent, in full remission (H)    Vitamin D deficiency    Syncope    Seizure-like activity (H)    Syncope, unspecified syncope type    Bipolar affective disorder (H)    Has access to planned means of suicide       "

## 2024-11-18 NOTE — PROGRESS NOTES
"Rehab Group     Start time: 1330  End time: 1430  Patient time total: 15 minutes     attended full group      #3 attended   Group Type: music   Group Topic Covered: balanced lifestyle, healthy leisure time, and self-care      Group Session Detail: Music & Motion (mindfulness)      Patient Response/Contribution: actively engaged      Patient Detail: Pt engaged mindfully in the first 15 minutes of afternoon Music Therapy session, then politely excused herself with 1:1 staff and did not return to session.      While in attendance, she selected the 80's song \"One More Time\" by Wiley ARREGUIN, which she shared that her mom shared with her when she was little as a favorite song.      No concerns or redirections needed while in session.       Activity Therapy Per 15 min ()    Patient Active Problem List   Diagnosis    MENTAL HEALTH    Suicidal ideation    Suicidal ideations    Intellectual disability    Bipolar affective disorder, currently depressed, moderate (H)    Borderline personality disorder (H)    Morbid obesity (H)    Unspecified mood (affective) disorder (H)    Chronic constipation    History of suicide attempt    Hyperhidrosis    Major depressive disorder, recurrent, in full remission (H)    Vitamin D deficiency    Syncope    Seizure-like activity (H)    Syncope, unspecified syncope type    Bipolar affective disorder (H)    Has access to planned means of suicide       "

## 2024-11-18 NOTE — PROGRESS NOTES
11/18/24 1417   Individualization/Patient Specific Goals   Patient Vulnerabilities adverse childhood experience(s);lacks insight into illness;history of unsuccessful treatment   Interprofessional Rounds   Summary There was discussion about pt's presentation on the unit   Participants advanced practice nurse;CTC;nursing   Behavioral Team Discussion   Participants Adrienne ESTEVES; Estefany Brand RN; Lynne Morel CTC   Progress Minimal   Anticipated length of stay 3 to 5 days or until stable   Continued Stay Criteria/Rationale SI   Medical/Physical See H&P   Precautions See belwo   Plan Stablize on medications and establish safe discharge plan   Rationale for change in precautions or plan No change   Safety Plan Safe secure milieu   Anticipated Discharge Disposition group home;home with family     PRECAUTIONS AND SAFETY    Behavioral Orders   Procedures    Assault precautions    Code 1 - Restrict to Unit    DISCUS     High dose Abilify combined with Zyprexa. Need baseline    Routine Programming     As clinically indicated    Seizure precautions    Self Injury Precaution     scratching    Status 15     Every 15 minutes.    Status Individual Observation     Patient SIO status reviewed with team/RN.  Please also refer to RN/team documentation for add'l detail.    -SIO staff to monitor following which have contributed to patient being on SIO:  Patient is not stable and is at high risk for self harm.   -Possible interventions SIO staff could use to support patient's treatment progress:  Offer coping skills or medications.   -When following observed, team will review discontinuation of SIO:  Terminate when patient is able to follow direction and maintain safety.     Order Specific Question:   CONTINUOUS 24 hours / day     Answer:   Other     Order Specific Question:   Specify distance     Answer:   10 feet     Order Specific Question:   Indications for SIO     Answer:   Self-injury risk    Suicide precautions:  Suicide Risk: HIGH     Patients on Suicide Precautions should have a Combination Diet ordered that includes a Diet selection(s) AND a Behavioral Tray selection for Safe Tray - with utensils, or Safe Tray - NO utensils       Order Specific Question:   Suicide Risk     Answer:   HIGH       Safety  Safety WDL: WDL  Patient Location: hallway, patient room, own  Observed Behavior: walking, sitting  Observed Behavior (Comment): [facility]  Safety Measures: safety rounds completed, suicide assessment completed  Diversional Activity: music, play, structured exercise  Suicidality: SIO (Status Individual Observation)  (NOTE - order will specify distance, Behavioral scrubs (pajamas), Perform mouth checks after medication administration to prevent hoarding, Optimize communication / relationship to minimize opportunity for self-harm, Promote patient engagement with treatment process, Identify and strengthen protective factors  Seizure precautions: clutter free environment, mattress on floor, calm, consistent lighting  Assault: status continuous sight, private room, behavioral scrubs (pajamas)  Additional Documentation: De-Escalation Techniques (Row)

## 2024-11-18 NOTE — PROGRESS NOTES
"Patient was awake, alert and walking through hallway at the beginning of shift.  She was cooperative and talkative during assessment.  Patient expressed feelings of sadness about family having to go home from their visit the day before.  Patient expressed a goal of \"being more social\" during the day.  Patient was smiling and laughing while playing card games with nursing students.  Patient went to music therapy and engaged with the group more than previous days per patient's report of \"only staying for one minute\" previously. In the morning music therapy she initially left after a few minutes and then later came back and requested a song to be played.  "

## 2024-11-18 NOTE — PLAN OF CARE
Problem: Sleep Disturbance  Goal: Adequate Sleep/Rest  Outcome: Progressing   Goal Outcome Evaluation:    Pt appeared to have slept for hours.  Respirations are even and unlabored.  No medical/safety concerns.  Prn Hydroxyzine 50 mg administered for anxiety rated 7/10.  Effective.  Pt remain on SIO for safety.  SI/SIB/Assault/Seizure precautions in place.

## 2024-11-18 NOTE — PLAN OF CARE
"  Problem: Suicide Risk  Goal: Absence of Self-Harm  Description: Pt will remain safe on the unit every shift as evidenced by taking scheduled medications, checking in with her primary nurse every shift, attend 50% of programming, identify and utilize 3 skills to improve coping with stressors.  Outcome: Progressing   Goal Outcome Evaluation:         Pt remains on SIO for safety. Pt was visible in the milieu and social with staff and peers. Pt would brighten some what on approach. Later in the evening pt stated she felt like she was going to hurt herself. Pt stated she is \"just in my own thoughts.\" Pt requested and received PRN hydroxyzine and lavender patch. Pt was able to talk with her mom on the phone and with staff. When writer gave pt scheduled medications, pt stated \"I'm just trying not to bite myself right now.\" Pt began crying and stated she misses her mom. Pt was able to calm and walk with staff in the hallway. Pt was still having urges to bite herself, requested and received figit chew toy from her locker. Pt went to sleep with no concerns. Will continue to monitor.               "

## 2024-11-18 NOTE — PROGRESS NOTES
"[facility], [place]   Psychiatric Progress Note        Interim History:   Team meeting report: The patient's care was discussed with the treatment team during the daily team meeting and/or staff's chart notes were reviewed.  Staff reports is impulsive and unpredictable.  She reported anxiety and requested a female nurse.  She is reporting depression and chronic suicidal thoughts.  Her mother came to see her and supposedly went well.  The patient continues to have poor judgment.  She isolates at times.  In the evening, she reported feeling shaky and having stomach pain and headaches.  Cogentin was given.  The patient was visible in the milieu.  Mood changes but was mostly calm.  Attended groups.  Slept 4 hours.    Met with patient.  States she is not sleeping on the floor because she was purposefully falling from the bed.  Does not remember what made her do that.  She was upset about something.  When asked if it was her mother that upset her as she stated yes.  She feels comfortable sleeping on the the floor.  Reports that she was not able to sleep that well last night, \"I was up until 6 AM.  Does not feel tired.  She likes to sleep during the day and not at night because she feels scared.  Reported drinking 3 apple juices yesterday and having shakiness after that.  There is a slightly tremor but nothing serious.  Reports she is not eating well and losing 13 pounds since she was hospitalized.  The patient continues to report suicidal ideation and self-harm.  She does not feel safe.  Will continue the SIO.    Discussed disposition.  The patient believes that that she will be able to go home once she is stable.  She thinks that that her mother will take her back.  When I brought up at the group home, the patient states that she does not want to go to a group home because she had bad experience in the past.    Left another message for patient's mom Yarely Ross phone #[phone], to " inquiry about discharge disposition.          Medications:     Current Facility-Administered Medications   Medication Dose Route Frequency Provider Last Rate Last Admin    [START ON 11/29/2024] ARIPiprazole lauroxil ER (ARISTADA) intra-muscular 882 mg  882 mg Intramuscular Q28 Days Adrienne Tejada APRN CNP        cloNIDine (CATAPRES) tablet 0.1 mg  0.1 mg Oral At Bedtime Mara Burrows APRN CNP   0.1 mg at 11/17/24 1919    dicyclomine (BENTYL) tablet 20 mg  20 mg Oral BID Ambrocio Ferro MD   20 mg at 11/18/24 0806    divalproex sodium extended-release (DEPAKOTE ER) 24 hr tablet 500 mg  500 mg Oral At Bedtime Naegele, Debra Ann, APRN CNS   500 mg at 11/17/24 1920    docusate sodium (COLACE) capsule 100 mg  100 mg Oral BID Ambrocio Ferro MD   100 mg at 11/18/24 0806    [START ON 11/19/2024] FLUoxetine (PROzac) capsule 40 mg  40 mg Oral Daily Adrienne Tejada APRN CNP        levothyroxine (SYNTHROID/LEVOTHROID) tablet 25 mcg  25 mcg Oral Daily Ambrocio Ferro MD   25 mcg at 11/18/24 0806    miconazole (MICATIN) 2 % powder   Topical BID Leo Lowe MD   Given at 11/18/24 0807    multivitamin w/minerals (THERA-VIT-M) tablet 1 tablet  1 tablet Oral Daily Ambrocio Ferro MD   1 tablet at 11/18/24 0806    OLANZapine (zyPREXA) tablet 5 mg  5 mg Oral At Bedtime Adrienne Tejada APRN CNP   5 mg at 11/17/24 1919    pantoprazole (PROTONIX) EC tablet 40 mg  40 mg Oral Daily Ambrocio Ferro MD   40 mg at 11/18/24 0806    traZODone (DESYREL) tablet 100 mg  100 mg Oral At Bedtime Ambrocio Ferro MD   100 mg at 11/17/24 1920    Vitamin D3 (CHOLECALCIFEROL) tablet 25 mcg  25 mcg Oral Daily Ambrocio Ferro MD   25 mcg at 11/18/24 0806            Allergies:     Allergies   Allergen Reactions    Penicillins Rash and Unknown            Labs:     Recent Results (from the past 4 weeks)   UA with Microscopic reflex to Culture    Collection Time: 10/25/24  7:40 PM    Specimen: Urine, Midstream   Result  Value Ref Range    Color Urine Light Yellow Colorless, Straw, Light Yellow, Yellow    Appearance Urine Clear Clear    Glucose Urine Negative Negative mg/dL    Bilirubin Urine Negative Negative    Ketones Urine Negative Negative mg/dL    Specific Gravity Urine 1.021 1.001 - 1.030    Blood Urine Negative Negative    pH Urine 6.5 5.0 - 7.0    Protein Albumin Urine Negative Negative mg/dL    Urobilinogen Urine <2.0 <2.0 mg/dL    Nitrite Urine Negative Negative    Leukocyte Esterase Urine Negative Negative    Mucus Urine Present (A) None Seen /LPF    RBC Urine 0 <=2 /HPF    WBC Urine 1 <=5 /HPF    Squamous Epithelials Urine 2 (H) <=1 /HPF   HCG qualitative urine    Collection Time: 10/25/24  7:40 PM   Result Value Ref Range    hCG Urine Qualitative Negative Negative   ECG 12-LEAD WITH MUSE (LHE)    Collection Time: 10/25/24  8:28 PM   Result Value Ref Range    Systolic Blood Pressure 117 mmHg    Diastolic Blood Pressure 56 mmHg    Ventricular Rate 86 BPM    Atrial Rate 86 BPM    NY Interval 184 ms    QRS Duration 80 ms     ms    QTc 411 ms    P Axis 64 degrees    R AXIS 74 degrees    T Axis 22 degrees    Interpretation ECG       Sinus rhythm  Normal ECG  When compared with ECG of 19-Oct-2024 06:28,  Vent. rate has increased by  29 bpm  Confirmed by SEE ED PROVIDER NOTE FOR, ECG INTERPRETATION ([id number]),  TERESA SNOWDEN ([id number]) on 10/27/2024 4:23:55 AM     Wet prep    Collection Time: 10/25/24  8:57 PM    Specimen: Vagina; Swab   Result Value Ref Range    Trichomonas Absent Absent    Yeast Absent Absent    Clue Cells Absent Absent    WBCs/high power field None None   CBC (+ platelets, no diff)    Collection Time: 10/25/24  9:03 PM   Result Value Ref Range    WBC Count 7.6 4.0 - 11.0 10e3/uL    RBC Count 4.47 3.80 - 5.20 10e6/uL    Hemoglobin 12.3 11.7 - 15.7 g/dL    Hematocrit 37.1 35.0 - 47.0 %    MCV 83 78 - 100 fL    MCH 27.5 26.5 - 33.0 pg    MCHC 33.2 31.5 - 36.5 g/dL    RDW 13.5 10.0 - 15.0 %    Platelet  Count 218 150 - 450 10e3/uL   Basic metabolic panel    Collection Time: 10/25/24  9:03 PM   Result Value Ref Range    Sodium 141 135 - 145 mmol/L    Potassium 3.7 3.4 - 5.3 mmol/L    Chloride 105 98 - 107 mmol/L    Carbon Dioxide (CO2) 23 22 - 29 mmol/L    Anion Gap 13 7 - 15 mmol/L    Urea Nitrogen 12.1 6.0 - 20.0 mg/dL    Creatinine 0.73 0.51 - 0.95 mg/dL    GFR Estimate >90 >60 mL/min/1.73m2    Calcium 8.9 8.8 - 10.4 mg/dL    Glucose 89 70 - 99 mg/dL   Hepatic function panel    Collection Time: 10/25/24  9:03 PM   Result Value Ref Range    Protein Total 7.4 6.4 - 8.3 g/dL    Albumin 4.6 3.5 - 5.2 g/dL    Bilirubin Total <0.2 <=1.2 mg/dL    Alkaline Phosphatase 55 40 - 150 U/L    AST 13 0 - 45 U/L    ALT 13 0 - 50 U/L    Bilirubin Direct <0.20 0.00 - 0.30 mg/dL   Lipase    Collection Time: 10/25/24  9:03 PM   Result Value Ref Range    Lipase 39 13 - 60 U/L   Troponin T, High Sensitivity    Collection Time: 10/25/24  9:03 PM   Result Value Ref Range    Troponin T, High Sensitivity <6 <=14 ng/L   D dimer quantitative    Collection Time: 10/25/24  9:03 PM   Result Value Ref Range    D-Dimer Quantitative <0.27 0.00 - 0.50 ug/mL FEU   ECG 12-LEAD WITH MUSE (LHE)    Collection Time: 10/28/24  6:07 PM   Result Value Ref Range    Systolic Blood Pressure  mmHg    Diastolic Blood Pressure  mmHg    Ventricular Rate 85 BPM    Atrial Rate 85 BPM    KS Interval 172 ms    QRS Duration 84 ms     ms    QTc 423 ms    P Axis 51 degrees    R AXIS 44 degrees    T Axis 49 degrees    Interpretation ECG       Sinus rhythm  Normal ECG  When compared with ECG of 25-Oct-2024 20:28,  No significant change was found  Confirmed by SEE ED PROVIDER NOTE FOR, ECG INTERPRETATION ([id number]),  TERESA SNOWDEN ([id number]) on 10/30/2024 2:27:35 AM     Neisseria gonorrhoeae PCR    Collection Time: 10/31/24  1:13 PM    Specimen: Urine, Voided   Result Value Ref Range    Neisseria gonorrhoeae Negative Negative   Chlamydia trachomatis PCR     Collection Time: 10/31/24  1:13 PM    Specimen: Urine, Voided   Result Value Ref Range    Chlamydia trachomatis Negative Negative   TSH with free T4 reflex    Collection Time: 10/31/24  2:11 PM   Result Value Ref Range    TSH 1.86 0.30 - 4.20 uIU/mL   Lipid panel reflex to direct LDL    Collection Time: 10/31/24  2:11 PM   Result Value Ref Range    Cholesterol 135 <200 mg/dL    Triglycerides 166 (H) <150 mg/dL    Direct Measure HDL 40 (L) >=50 mg/dL    LDL Cholesterol Calculated 62 <100 mg/dL    Non HDL Cholesterol 95 <130 mg/dL    Patient Fasting > 8hrs? Unknown    HIV Antigen Antibody Combo Cascade    Collection Time: 10/31/24  2:11 PM   Result Value Ref Range    HIV Antigen Antibody Combo Nonreactive Nonreactive   Treponema Abs w Reflex to RPR and Titer    Collection Time: 10/31/24  2:11 PM   Result Value Ref Range    Treponema Antibody Total Nonreactive Nonreactive   UA with Microscopic reflex to Culture    Collection Time: 11/04/24 12:25 PM    Specimen: Urine, Clean Catch   Result Value Ref Range    Color Urine Yellow Colorless, Straw, Light Yellow, Yellow    Appearance Urine Slightly Cloudy (A) Clear    Glucose Urine Negative Negative mg/dL    Bilirubin Urine Negative Negative    Ketones Urine Negative Negative mg/dL    Specific Gravity Urine >1.030 1.003 - 1.035    Blood Urine Negative Negative    pH Urine 6.0 5.0 - 7.0    Protein Albumin Urine 30 (A) Negative mg/dL    Urobilinogen Urine 0.2 0.2, 1.0 mg/dL    Nitrite Urine Negative Negative    Leukocyte Esterase Urine Trace (A) Negative    Mucus Urine Present (A) None Seen /LPF    RBC Urine 5 (H) <=2 /HPF    WBC Urine 9 (H) <=5 /HPF    Squamous Epithelials Urine 32 (H) <=1 /HPF   HCG qualitative urine    Collection Time: 11/04/24 12:25 PM   Result Value Ref Range    hCG Urine Qualitative Negative Negative   Comprehensive metabolic panel    Collection Time: 11/04/24 12:58 PM   Result Value Ref Range    Sodium 139 135 - 145 mmol/L    Potassium 4.0 3.4 - 5.3  mmol/L    Carbon Dioxide (CO2) 21 (L) 22 - 29 mmol/L    Anion Gap 11 7 - 15 mmol/L    Urea Nitrogen 11.5 6.0 - 20.0 mg/dL    Creatinine 0.76 0.51 - 0.95 mg/dL    GFR Estimate >90 >60 mL/min/1.73m2    Calcium 9.7 8.8 - 10.4 mg/dL    Chloride 107 98 - 107 mmol/L    Glucose 93 70 - 99 mg/dL    Alkaline Phosphatase 59 40 - 150 U/L    AST 12 0 - 45 U/L    ALT 13 0 - 50 U/L    Protein Total 7.6 6.4 - 8.3 g/dL    Albumin 4.6 3.5 - 5.2 g/dL    Bilirubin Total 0.2 <=1.2 mg/dL   Lipase    Collection Time: 11/04/24 12:58 PM   Result Value Ref Range    Lipase 29 13 - 60 U/L   CBC with platelets and differential    Collection Time: 11/04/24 12:58 PM   Result Value Ref Range    WBC Count 8.1 4.0 - 11.0 10e3/uL    RBC Count 4.47 3.80 - 5.20 10e6/uL    Hemoglobin 12.6 11.7 - 15.7 g/dL    Hematocrit 35.9 35.0 - 47.0 %    MCV 80 78 - 100 fL    MCH 28.2 26.5 - 33.0 pg    MCHC 35.1 31.5 - 36.5 g/dL    RDW 13.0 10.0 - 15.0 %    Platelet Count 238 150 - 450 10e3/uL    % Neutrophils 69 %    % Lymphocytes 25 %    % Monocytes 5 %    % Eosinophils 1 %    % Basophils 1 %    % Immature Granulocytes 0 %    NRBCs per 100 WBC 0 <1 /100    Absolute Neutrophils 5.6 1.6 - 8.3 10e3/uL    Absolute Lymphocytes 2.0 0.8 - 5.3 10e3/uL    Absolute Monocytes 0.4 0.0 - 1.3 10e3/uL    Absolute Eosinophils 0.0 0.0 - 0.7 10e3/uL    Absolute Basophils 0.1 0.0 - 0.2 10e3/uL    Absolute Immature Granulocytes 0.0 <=0.4 10e3/uL    Absolute NRBCs 0.0 10e3/uL   Valproic acid    Collection Time: 11/10/24  8:33 AM   Result Value Ref Range    Valproic acid 41.9 (L)   ug/mL   UA with Microscopic reflex to Culture    Collection Time: 11/11/24  1:29 PM    Specimen: Urine, Midstream   Result Value Ref Range    Color Urine Yellow Colorless, Straw, Light Yellow, Yellow    Appearance Urine Clear Clear    Glucose Urine Negative Negative mg/dL    Bilirubin Urine Negative Negative    Ketones Urine 10 (A) Negative mg/dL    Specific Gravity Urine 1.029 1.003 - 1.035    Blood Urine  Negative Negative    pH Urine 6.0 5.0 - 7.0    Protein Albumin Urine Negative Negative mg/dL    Urobilinogen Urine Normal Normal, 2.0 mg/dL    Nitrite Urine Negative Negative    Leukocyte Esterase Urine Negative Negative    Mucus Urine Present (A) None Seen /LPF    RBC Urine 0 <=2 /HPF    WBC Urine 1 <=5 /HPF    Squamous Epithelials Urine <1 <=1 /HPF   Glucose by meter    Collection Time: 11/15/24  3:40 PM   Result Value Ref Range    GLUCOSE BY METER POCT 107 (H) 70 - 99 mg/dL   Glucose by meter    Collection Time: 11/17/24  4:19 PM   Result Value Ref Range    GLUCOSE BY METER POCT 80 70 - 99 mg/dL            Precautions:     Behavioral Orders   Procedures    Assault precautions    Code 1 - Restrict to Unit    DISCUS     High dose Abilify combined with Zyprexa. Need baseline    Routine Programming     As clinically indicated    Seizure precautions    Self Injury Precaution     scratching    Status 15     Every 15 minutes.    Status Individual Observation     Patient SIO status reviewed with team/RN.  Please also refer to RN/team documentation for add'l detail.    -SIO staff to monitor following which have contributed to patient being on SIO:  Patient is not stable and is at high risk for self harm.   -Possible interventions SIO staff could use to support patient's treatment progress:  Offer coping skills or medications.   -When following observed, team will review discontinuation of SIO:  Terminate when patient is able to follow direction and maintain safety.     Order Specific Question:   CONTINUOUS 24 hours / day     Answer:   Other     Order Specific Question:   Specify distance     Answer:   10 feet     Order Specific Question:   Indications for SIO     Answer:   Self-injury risk    Suicide precautions: Suicide Risk: HIGH     Patients on Suicide Precautions should have a Combination Diet ordered that includes a Diet selection(s) AND a Behavioral Tray selection for Safe Tray - with utensils, or Safe Tray - NO  utensils       Order Specific Question:   Suicide Risk     Answer:   HIGH            Psychiatric Examination:   Temp: 97.3  F (36.3  C) Temp src: Temporal BP: 112/74 Pulse: 79   Resp: 18 SpO2: 99 % O2 Device: None (Room air)    Weight is 235 lbs 0 oz  Body mass index is 41.63 kg/m .    Appearance: awake, alert and adequately groomed  Attitude:  cooperative  Eye Contact:  good  Mood:  anxious and depressed  Affect:  intensity is flat  Speech:  clear, coherent  Psychomotor Behavior:  no evidence of tardive dyskinesia, dystonia, or tics  Throught Process:  linear  Associations:  no loose associations  Thought Content:  no evidence of suicidal ideation or homicidal ideation, no auditory hallucinations present, and no visual hallucinations present  Insight:  limited  Judgement:  limited  Oriented to:  time, person, and place  Attention Span and Concentration:  limited  Recent and Remote Memory:  limited         Precautions:     Behavioral Orders   Procedures    Assault precautions    Code 1 - Restrict to Unit    DISCUS     High dose Abilify combined with Zyprexa. Need baseline    Routine Programming     As clinically indicated    Seizure precautions    Self Injury Precaution     scratching    Status 15     Every 15 minutes.    Status Individual Observation     Patient SIO status reviewed with team/RN.  Please also refer to RN/team documentation for add'l detail.    -SIO staff to monitor following which have contributed to patient being on SIO:  Patient is not stable and is at high risk for self harm.   -Possible interventions SIO staff could use to support patient's treatment progress:  Offer coping skills or medications.   -When following observed, team will review discontinuation of SIO:  Terminate when patient is able to follow direction and maintain safety.     Order Specific Question:   CONTINUOUS 24 hours / day     Answer:   Other     Order Specific Question:   Specify distance     Answer:   10 feet     Order  Specific Question:   Indications for SIO     Answer:   Self-injury risk    Suicide precautions: Suicide Risk: HIGH     Patients on Suicide Precautions should have a Combination Diet ordered that includes a Diet selection(s) AND a Behavioral Tray selection for Safe Tray - with utensils, or Safe Tray - NO utensils       Order Specific Question:   Suicide Risk     Answer:   HIGH          DIagnoses:   Borderline personality disorder  Intellectual disability  Suicidal ideation  Bipolar affective disorder, per history  PTSD  Psychogenic nonepileptic seizures         Plan:   Medications:    -- Abilify Aristada intramuscular injection 882 mg every 28 days.  Next dose should be on 11/29  --Clonidine 0.1 mg at bedtime  -- Decrease Prozac to 40 mg, it might be increasing the impulsivity, on 11/18  -- Zyprexa, 5 mg at bedtime  -- Trazodone, 100 mg at bedtime  -- Depakote 500 mg , qhs  -- Hydroxyzine and Zyprexa are available as needed    Medical:  --Internal medicine to follow up for medical problems     --Lab work was reviewed.  Urinalysis is abnormal.  The rest of the lab work is WNL.  --Valproic acid, CBC, CMP, vit D, B12, Folate, TSH with T4  --Urine culture was ordered    Consults:   --Care was coordinated with the treatment team.   --The patient was consulted on nature of illness and treatment options.     --SIO for safety    --No bed due to self harm by falling from it.      Disposition Plan   Reason for ongoing admission: poses an imminent risk to self  Discharge location:  TBD  Discharge Medications: not ordered  Follow-up Appointments: not scheduled  Legal Status: voluntary   Discharge will be granted once symptoms improved.    Contacts:  Medication Management/Psychiatry:  Name/Clinic: LINN Tong, CNP at [facility]. Last visit was 11/6/24  Number: [phone]     Therapist:   Name/Clinic: Lynne Sawyer at [facility]   Number: [phone]     /ACT Team:  Name/Clinic: [facility]  [facility]    Number:      Other contact information (family, friends, SO) and DANDY status: Yarely Ross (Mother) [phone]      Adrienne ESTEVES, CNP    This note was created with the help of Dragon dictation system. All grammatical/typing errors or context distortion are unintentional and inherent to software.

## 2024-11-18 NOTE — PLAN OF CARE
"  Problem: Suicide Risk  Goal: Absence of Self-Harm  Description: Pt will remain safe on the unit every shift as evidenced by taking scheduled medications, checking in with her primary nurse every shift, attend 50% of programming, identify and utilize 3 skills to improve coping with stressors.  Intervention: Promote Psychosocial Wellbeing  Recent Flowsheet Documentation  Taken 11/18/2024 1000 by Estefany Sarabia RN  Family/Support System Care: support provided   Goal Outcome Evaluation:    Pt continues on SIO for safety. Was visible on the unit, social with,peers, students, and staff. Attended and participated on unit activities. Ate 100% and snacks meals. Pleasant and cooperative but continues to report SIB with no plan. Was med compliant but refused to contract for safety. Rated anxiety and depression at 5/10. Scheduled meds were effective. Denied having hallucinations. No verbal outbursts noted.  Prozac was reduced from 60 mg to 40 mg daily. Labs ordered for tomorrow morning. Will continue to monitor.     /74   Pulse 79   Temp 97.3  F (36.3  C) (Temporal)   Resp 18   Ht 1.6 m (5' 3\")   Wt 106.6 kg (235 lb)   LMP  (LMP Unknown)   SpO2 99%   BMI 41.63 kg/m          "

## 2024-11-18 NOTE — PLAN OF CARE
Team Note Due:  Monday  Assessment/Intervention/Current Symptoms and Care Coordination  Chart review and met with team, discussed pt progress, symptomology, and response to treatment.  Discussed the discharge plan and any potential impediments to discharge.    CTC was informed by covering psych provider that pt is reporting that she can now discharge home. Covering psych provider reported she was unable to get a hold of pt's mother to confirm.     CTC completed acuity rating and progress notes.     Discharge Plan or Goal  Pending stabilization & development of a safe discharge plan.    Dispo Plan: Still assessing  Discharge Date/ time: TBD  Transportation: TBD  Provisional Discharge: Yes[] No[x]    Barriers to Discharge   Patient requires further psychiatric stabilization due to current symptomology    Referral Status  Still assessing    Legal Status  Patient is voluntary          Contacts:  Medication Management/Psychiatry:  Name/Clinic: LINN Tong CNP at [facility]. Last visit was 11/6/24  Number: [phone]     Therapist:   Name/Clinic: Lynne Sawyer at [facility]   Number: [phone]     /ACT Team:  Name/Clinic: Pau Buck at [place]    Number: [phone]       Lucina with [facility] Care Coordination ([facility])  [phone]    Other contact information (family, friends, SO) and DANDY status: Yarely Ross (Mother) [phone]       Upcoming Meetings and Dates/Important Information and next steps:  CTC to follow up with EMIGDIO Maurice Walyndsay Worker at [facility], to discuss care coordination and discharge planning, if no return call received.     CTC to follow up with pt's mother to confirm pt can discharge home.

## 2024-11-19 LAB
ALBUMIN SERPL BCG-MCNC: 4.3 G/DL (ref 3.5–5.2)
ALP SERPL-CCNC: 59 U/L (ref 40–150)
ALT SERPL W P-5'-P-CCNC: 10 U/L (ref 0–50)
ANION GAP SERPL CALCULATED.3IONS-SCNC: 13 MMOL/L (ref 7–15)
AST SERPL W P-5'-P-CCNC: 11 U/L (ref 0–45)
BASOPHILS # BLD AUTO: 0.1 10E3/UL (ref 0–0.2)
BASOPHILS NFR BLD AUTO: 1 %
BILIRUB SERPL-MCNC: 0.2 MG/DL
BUN SERPL-MCNC: 15.1 MG/DL (ref 6–20)
CALCIUM SERPL-MCNC: 9.1 MG/DL (ref 8.8–10.4)
CHLORIDE SERPL-SCNC: 104 MMOL/L (ref 98–107)
CREAT SERPL-MCNC: 0.7 MG/DL (ref 0.51–0.95)
EGFRCR SERPLBLD CKD-EPI 2021: >90 ML/MIN/1.73M2
EOSINOPHIL # BLD AUTO: 0.2 10E3/UL (ref 0–0.7)
EOSINOPHIL NFR BLD AUTO: 3 %
ERYTHROCYTE [DISTWIDTH] IN BLOOD BY AUTOMATED COUNT: 13.5 % (ref 10–15)
FOLATE SERPL-MCNC: 8.3 NG/ML (ref 4.6–34.8)
GLUCOSE SERPL-MCNC: 90 MG/DL (ref 70–99)
HCO3 SERPL-SCNC: 21 MMOL/L (ref 22–29)
HCT VFR BLD AUTO: 36.7 % (ref 35–47)
HGB BLD-MCNC: 12.4 G/DL (ref 11.7–15.7)
IMM GRANULOCYTES # BLD: 0 10E3/UL
IMM GRANULOCYTES NFR BLD: 0 %
LYMPHOCYTES # BLD AUTO: 2.7 10E3/UL (ref 0.8–5.3)
LYMPHOCYTES NFR BLD AUTO: 32 %
MCH RBC QN AUTO: 27.5 PG (ref 26.5–33)
MCHC RBC AUTO-ENTMCNC: 33.8 G/DL (ref 31.5–36.5)
MCV RBC AUTO: 81 FL (ref 78–100)
MONOCYTES # BLD AUTO: 0.4 10E3/UL (ref 0–1.3)
MONOCYTES NFR BLD AUTO: 5 %
NEUTROPHILS # BLD AUTO: 5 10E3/UL (ref 1.6–8.3)
NEUTROPHILS NFR BLD AUTO: 60 %
NRBC # BLD AUTO: 0 10E3/UL
NRBC BLD AUTO-RTO: 0 /100
PLATELET # BLD AUTO: 213 10E3/UL (ref 150–450)
POTASSIUM SERPL-SCNC: 4.5 MMOL/L (ref 3.4–5.3)
PROT SERPL-MCNC: 7.3 G/DL (ref 6.4–8.3)
RBC # BLD AUTO: 4.51 10E6/UL (ref 3.8–5.2)
SODIUM SERPL-SCNC: 138 MMOL/L (ref 135–145)
TSH SERPL DL<=0.005 MIU/L-ACNC: 3.02 UIU/ML (ref 0.3–4.2)
VIT B12 SERPL-MCNC: 325 PG/ML (ref 232–1245)
VIT D+METAB SERPL-MCNC: 26 NG/ML (ref 20–50)
WBC # BLD AUTO: 8.4 10E3/UL (ref 4–11)

## 2024-11-19 PROCEDURE — 250N000013 HC RX MED GY IP 250 OP 250 PS 637: Performed by: NURSE PRACTITIONER

## 2024-11-19 PROCEDURE — 82607 VITAMIN B-12: CPT | Performed by: NURSE PRACTITIONER

## 2024-11-19 PROCEDURE — 36415 COLL VENOUS BLD VENIPUNCTURE: CPT | Performed by: NURSE PRACTITIONER

## 2024-11-19 PROCEDURE — 84155 ASSAY OF PROTEIN SERUM: CPT | Performed by: NURSE PRACTITIONER

## 2024-11-19 PROCEDURE — 85048 AUTOMATED LEUKOCYTE COUNT: CPT | Performed by: NURSE PRACTITIONER

## 2024-11-19 PROCEDURE — 250N000013 HC RX MED GY IP 250 OP 250 PS 637: Performed by: EMERGENCY MEDICINE

## 2024-11-19 PROCEDURE — 250N000013 HC RX MED GY IP 250 OP 250 PS 637: Performed by: CLINICAL NURSE SPECIALIST

## 2024-11-19 PROCEDURE — 97150 GROUP THERAPEUTIC PROCEDURES: CPT | Mod: GO

## 2024-11-19 PROCEDURE — 82746 ASSAY OF FOLIC ACID SERUM: CPT | Performed by: NURSE PRACTITIONER

## 2024-11-19 PROCEDURE — 82306 VITAMIN D 25 HYDROXY: CPT | Performed by: NURSE PRACTITIONER

## 2024-11-19 PROCEDURE — 84443 ASSAY THYROID STIM HORMONE: CPT | Performed by: NURSE PRACTITIONER

## 2024-11-19 PROCEDURE — 82565 ASSAY OF CREATININE: CPT | Performed by: NURSE PRACTITIONER

## 2024-11-19 PROCEDURE — 99232 SBSQ HOSP IP/OBS MODERATE 35: CPT | Performed by: NURSE PRACTITIONER

## 2024-11-19 PROCEDURE — 128N000002 HC R&B CD/MH ADOLESCENT

## 2024-11-19 PROCEDURE — 250N000011 HC RX IP 250 OP 636: Performed by: EMERGENCY MEDICINE

## 2024-11-19 PROCEDURE — 85004 AUTOMATED DIFF WBC COUNT: CPT | Performed by: NURSE PRACTITIONER

## 2024-11-19 RX ADMIN — DOCUSATE SODIUM 100 MG: 100 CAPSULE, LIQUID FILLED ORAL at 20:33

## 2024-11-19 RX ADMIN — DIVALPROEX SODIUM 500 MG: 500 TABLET, FILM COATED, EXTENDED RELEASE ORAL at 20:33

## 2024-11-19 RX ADMIN — ONDANSETRON HYDROCHLORIDE 8 MG: 4 TABLET, FILM COATED ORAL at 20:33

## 2024-11-19 RX ADMIN — DOCUSATE SODIUM 100 MG: 100 CAPSULE, LIQUID FILLED ORAL at 08:18

## 2024-11-19 RX ADMIN — PANTOPRAZOLE SODIUM 40 MG: 20 TABLET, DELAYED RELEASE ORAL at 08:18

## 2024-11-19 RX ADMIN — DICYCLOMINE HYDROCHLORIDE 20 MG: 20 TABLET ORAL at 20:33

## 2024-11-19 RX ADMIN — HYDROXYZINE HYDROCHLORIDE 25 MG: 25 TABLET, FILM COATED ORAL at 16:36

## 2024-11-19 RX ADMIN — LEVOTHYROXINE SODIUM 25 MCG: 25 TABLET ORAL at 08:18

## 2024-11-19 RX ADMIN — CLONIDINE HYDROCHLORIDE 0.1 MG: 0.1 TABLET ORAL at 20:32

## 2024-11-19 RX ADMIN — FLUOXETINE HYDROCHLORIDE 40 MG: 40 CAPSULE ORAL at 08:18

## 2024-11-19 RX ADMIN — DICYCLOMINE HYDROCHLORIDE 20 MG: 20 TABLET ORAL at 08:18

## 2024-11-19 RX ADMIN — TRAZODONE HYDROCHLORIDE 100 MG: 100 TABLET ORAL at 20:32

## 2024-11-19 RX ADMIN — Medication 25 MCG: at 08:18

## 2024-11-19 RX ADMIN — HYDROXYZINE HYDROCHLORIDE 50 MG: 25 TABLET, FILM COATED ORAL at 02:26

## 2024-11-19 RX ADMIN — OLANZAPINE 7.5 MG: 5 TABLET, FILM COATED ORAL at 20:33

## 2024-11-19 RX ADMIN — Medication 1 TABLET: at 08:18

## 2024-11-19 ASSESSMENT — ACTIVITIES OF DAILY LIVING (ADL)
ADLS_ACUITY_SCORE: 0
HYGIENE/GROOMING: INDEPENDENT
ADLS_ACUITY_SCORE: 0
ADLS_ACUITY_SCORE: 0

## 2024-11-19 NOTE — PLAN OF CARE
Problem: Sleep Disturbance  Goal: Adequate Sleep/Rest  Outcome: Progressing   Goal Outcome Evaluation:    Pt appeared to have slept for 5.5 hours.  Respirations are even and unlabored.  No medical/safety concerns this shift.  No prn administered.  Pt remain on SI/SIB/Assault/Seizure precautions.

## 2024-11-19 NOTE — CONSULTS
Consulted to run a test claim for Aristada.    Patient has pharmacy benefits through [employer] Part D & [employer]. Per insurance, the following are covered and preferred under the patient's plans:     Aristada 882mg/3.2ml - $0 (pt was receiving in-clinic PTA)       If patient is receiving this injection in-clinic, medication will need to be billed under medical benefits. Discharge pharmacy liaison is unable to verify coverage under the medical benefit. To verify coverage under medical benefits, patient, SW, RNCC, CM, or other member of care team may:    Contact Member Services department of patient's insurance, OR   Complete a cost of care estimate request by calling [phone] or via [email or web address]  Only valid to check benefits if receiving injection at an [facility] or Infusion Site  Turnaround time is about 1 to 3 business days for this estimate    When an outpatient order for this injection is placed in chart along with an appointment for administration at an [facility]/infusion center, coverage will be secured by the CAM and Infusion Finance teams.      Please feel free to contact me with any other test claims, prior authorizations, or insurance questions regarding outpatient medications.     Thanks!      Olamide Begum CPhT  Discharge Pharmacy Liaison  [facility]charge [facility]  Pronouns: She/Her/Hers    Securely message with [employer], [employer] Secure [employer], or [employer]  Phone: [phone]  Fax: [phone]  [email or web address]

## 2024-11-19 NOTE — PLAN OF CARE
BEH IP Unit Acuity Rating Score (UARS)  Patient is given one point for every criteria they meet.    CRITERIA SCORING   On a 72 hour hold, court hold, committed, stay of commitment, or revocation. 0    Patient LOS on BEH unit exceeds 20 days. 0  LOS: 11   Patient under guardianship, 55+, otherwise medically complex, or under age 11. 0   Suicide ideation without relief of precipitating factors. 1   Current plan for suicide. 0   Current plan for homicide. 0   Imminent risk or actual attempt to seriously harm another without relief of factors precipitating the attempt. 0   Severe dysfunction in daily living (ex: complete neglect for self care, extreme disruption in vegetative function, extreme deterioration in social interactions). 1   Recent (last 7 days) or current physical aggression in the ED or on unit. 0   Restraints or seclusion episode in past 72 hours. 0   Recent (last 7 days) or current verbal aggression, agitation, yelling, etc., while in the ED or unit. 1 Last 11/15   Active psychosis. 0   Need for constant or near constant redirection (from leaving, from others, etc).  0   Intrusive or disruptive behaviors. 0   Patient requires 3 or more hours of individualized nursing care per 8-hour shift (i.e. for ADLs, meds, therapeutic interventions). 1   TOTAL 4

## 2024-11-19 NOTE — PLAN OF CARE
Problem: Suicide Risk  Goal: Absence of Self-Harm  Description: Pt will remain safe on the unit every shift as evidenced by taking scheduled medications, checking in with her primary nurse every shift, attend 50% of programming, identify and utilize 3 skills to improve coping with stressors.  Outcome: Progressing     Problem: Psychotic Symptoms  Goal: Psychotic Symptoms  Description: Signs and symptoms of listed problems will be absent or manageable.  Outcome: Progressing   Goal Outcome Evaluation:  Pt continues to endorse SI/SIB with plans to scratch self. Endorsed stomach ache/ discomfort rated 7/10. Rated anxiety 7/10. Denies hallucination, depression. She contracted for safety and agreed to talk to staff if urges increases. Declined PRN offered. Fair appetite at supper. Reported increase nauseous and vomiting of small food content after supper. Received PRN Zofran with positive effect reported. Continues on SIO for safety, no aggressive behavior noted.

## 2024-11-19 NOTE — PROGRESS NOTES
Patient left group room angry after meeting with unit therapist. Patient cursed out towards therapist. Patient went to her room angry and started to bite her wrist. Patient started to yell towards writer after writer tried intervention. Patient's nurse switched with the writer on SIO. While writer was leaving the room, patient told the writer to kill themself.

## 2024-11-19 NOTE — PLAN OF CARE
Goal Outcome Evaluation:    Problem: Anxiety Signs/Symptoms  Goal: Improved Somatic Symptoms (Anxiety Signs/Symptoms)  Outcome: Not Progressing    Pt was up early,   Visible in the milieu.    Blunted affect/socially withdrawn   Remains on SIO 1:1 for safety    Pt endorsed anxiety   Pt has chronic SI/SIB with no stated plan currently.   Pt refuses to contract for safety. VS stable  Pt had lab this morning   Pt was medication complaint    Pt c/o of stomach pain due to the diarrhea/headache that is always constant.   No PRN given   Pt paces the hallway with the headset on  Goal today was to attend group  No PRN given    Meal- ate both breakfast and lunch in the her room    1100-writer was called to pt room. Pt was biting herself due to the bracelets she made in OT being taken away. Pt was it is unit policy that they have stay in her room. Pt was instructed to find writer is urges are strong as she was not naeem for safety.   Headphones provided.     1215-pt was heard yelling at her SIO staff. Writer intervened and sat in the room with pt (need note from SIO staff). Played card games with the pt, refused any PRNs    Plan: Zyprexa was increased this morning

## 2024-11-19 NOTE — PROGRESS NOTES
Rehab Group    Start time: 1020  End time: 1120  Patient time total: 35 minutes    attended partial group    #4 attended   Group Type: occupational therapy   Group Topic Covered: coping skills     Group Session Detail:  OT clinic     Patient Response/Contribution:  cooperative with task, attentive, and actively engaged     Patient Detail:    Pt actively participated in occupational therapy clinic to facilitate coping skill exploration, creative expression within personally meaningful activities, and clinical observation of social, cognitive, and kinesthetic performance skills. Pt response: Minimal assistance needed to initiate, gather materials, sequence, and adjust to workspace demands as needed. Demonstrated fair focus, planning, and attention to detail for selected creative expression task. Able to ask for assistance as needed, and intermittently socialized with peers and staff. Requested assistance for the visual scanning component of her task, though was primarily independent with this task step. Observed adding a positive affirmation to her project. Shared that she had a positive visit with her mom on the unit recently. Left group early upon completing her project. Calm, pleasant, and engaged.      [id number] OT Group (2 or more in attendance)      Patient Active Problem List   Diagnosis    MENTAL HEALTH    Suicidal ideation    Suicidal ideations    Intellectual disability    Bipolar affective disorder, currently depressed, moderate (H)    Borderline personality disorder (H)    Morbid obesity (H)    Unspecified mood (affective) disorder (H)    Chronic constipation    History of suicide attempt    Hyperhidrosis    Major depressive disorder, recurrent, in full remission (H)    Vitamin D deficiency    Syncope    Seizure-like activity (H)    Syncope, unspecified syncope type    Bipolar affective disorder (H)    Has access to planned means of suicide

## 2024-11-19 NOTE — PLAN OF CARE
Team Note Due:  Monday  Assessment/Intervention/Current Symptoms and Care Coordination  Chart review and met with team, discussed pt progress, symptomology, and response to treatment.  Discussed the discharge plan and any potential impediments to discharge. Pt slept 5.5 hours. Pt is reported to continue to experience thoughts of SI/SIB.       Discharge Plan or Goal  Pending stabilization & development of a safe discharge plan.    Dispo Plan:Still assessing  Discharge Date/ time: TBD  Transportation: TBD  Provisional Discharge: Yes[] No[x]    Barriers to Discharge   Patient requires further psychiatric stabilization due to current symptomology    Referral Status  Still assessing    Legal Status  Patient is voluntary        Contacts:  Medication Management/Psychiatry:  Name/Clinic: LINN Tong CNP at [facility]. Last visit was 11/6/24  Number: [phone]     Therapist:   Name/Clinic: Lynne Sawyer at [facility]   Number: [phone]     /ACT Team:  Name/Clinic: Pau Buck at [place]    Number: [phone]       Lucina with [facility] Care Coordination ([facility])  [phone]    Other contact information (family, friends, SO) and DANDY status: Yarely Ross (Mother) [phone]       Upcoming Meetings and Dates/Important Information and next steps:  CTC to follow up with EMIGDIO Maurice Worker at [place], to discuss care coordination and discharge planning, if no return call received.     CTC to follow up with pt's mother to confirm pt can discharge home.

## 2024-11-19 NOTE — PROGRESS NOTES
"[facility], [place]   Psychiatric Progress Note        Interim History:   Team meeting report: The patient's care was discussed with the treatment team during the daily team meeting and/or staff's chart notes were reviewed.  Staff reports that the patient continues to be impulsive and unpredictable.  She remains on SIO for safety.  She was visible in the milieu and social with peers.  She attended some groups.  Ate 100% of her meals and snacks.  She was pleasant and cooperative.  Continue to report SIB.  She was med compliant.  Anxiety and depression were rated as moderate.  In the evening, affect was mostly flat but brightens on approach.  She was visible in the milieu.  She reported urges to harm herself.  She requested hydroxyzine and lavender patch.  She talked to her mother on the phone which made her sad.  She had thoughts of biting herself.  Ate well.  Med compliant.  Slept 5-1/2 hours.    Met with patient.  Continues to report suicidal ideation and self-harm.  She is not able to contract for safety.  She  does not think canceling the SIO will be helpful.  Her sleep was \"horrible\".  That she has been having difficulties falling asleep and staying asleep.  She is awaken by the noise and lights on the unit.  Her appetite was also \"horrible\".  She is having stomach issues.  Reports eating all of her breakfast this morning.  The patient is not hallucinating.  Reports no improvement in her mood.  Discussed medication changes she is interested in increasing the dose of the Zyprexa at bedtime to help her sleep.  She does not want to take melatonin because it is causing abdominal issues.     The patient is interested in being in the hospital for very long time.  Still thinks that her mom will take her back home.  Does not want to go to a group home.         Medications:     Current Facility-Administered Medications   Medication Dose Route Frequency Provider Last Rate Last Admin    [START " ON 11/29/2024] ARIPiprazole lauroxil ER (ARISTADA) intra-muscular 882 mg  882 mg Intramuscular Q28 Days Adrienne Tejada APRN CNP        cloNIDine (CATAPRES) tablet 0.1 mg  0.1 mg Oral At Bedtime Mara Burrows APRN CNP   0.1 mg at 11/18/24 1954    dicyclomine (BENTYL) tablet 20 mg  20 mg Oral BID Ambrocio Ferro MD   20 mg at 11/19/24 0818    divalproex sodium extended-release (DEPAKOTE ER) 24 hr tablet 500 mg  500 mg Oral At Bedtime NaegandresTereza APRN CNS   500 mg at 11/18/24 1954    docusate sodium (COLACE) capsule 100 mg  100 mg Oral BID Ambrocio Ferro MD   100 mg at 11/19/24 0818    FLUoxetine (PROzac) capsule 40 mg  40 mg Oral Daily Adrienne Tejada APRN CNP   40 mg at 11/19/24 0818    levothyroxine (SYNTHROID/LEVOTHROID) tablet 25 mcg  25 mcg Oral Daily Ambrocio Ferro MD   25 mcg at 11/19/24 0818    miconazole (MICATIN) 2 % powder   Topical BID Leo Lowe MD   Given at 11/18/24 0807    multivitamin w/minerals (THERA-VIT-M) tablet 1 tablet  1 tablet Oral Daily Ambrocio Ferro MD   1 tablet at 11/19/24 0818    OLANZapine (zyPREXA) tablet 7.5 mg  7.5 mg Oral At Bedtime Adrienne Tejada APRN CNP        pantoprazole (PROTONIX) EC tablet 40 mg  40 mg Oral Daily Ambrocio Ferro MD   40 mg at 11/19/24 0818    traZODone (DESYREL) tablet 100 mg  100 mg Oral At Bedtime Ambrocio Ferro MD   100 mg at 11/18/24 1954    Vitamin D3 (CHOLECALCIFEROL) tablet 25 mcg  25 mcg Oral Daily Ambrocio Ferro MD   25 mcg at 11/19/24 0818            Allergies:     Allergies   Allergen Reactions    Penicillins Rash and Unknown            Labs:     Recent Results (from the past 4 weeks)   UA with Microscopic reflex to Culture    Collection Time: 10/25/24  7:40 PM    Specimen: Urine, Midstream   Result Value Ref Range    Color Urine Light Yellow Colorless, Straw, Light Yellow, Yellow    Appearance Urine Clear Clear    Glucose Urine Negative Negative mg/dL    Bilirubin Urine Negative Negative     Ketones Urine Negative Negative mg/dL    Specific Gravity Urine 1.021 1.001 - 1.030    Blood Urine Negative Negative    pH Urine 6.5 5.0 - 7.0    Protein Albumin Urine Negative Negative mg/dL    Urobilinogen Urine <2.0 <2.0 mg/dL    Nitrite Urine Negative Negative    Leukocyte Esterase Urine Negative Negative    Mucus Urine Present (A) None Seen /LPF    RBC Urine 0 <=2 /HPF    WBC Urine 1 <=5 /HPF    Squamous Epithelials Urine 2 (H) <=1 /HPF   HCG qualitative urine    Collection Time: 10/25/24  7:40 PM   Result Value Ref Range    hCG Urine Qualitative Negative Negative   ECG 12-LEAD WITH MUSE (L[facility])    Collection Time: 10/25/24  8:28 PM   Result Value Ref Range    Systolic Blood Pressure 117 mmHg    Diastolic Blood Pressure 56 mmHg    Ventricular Rate 86 BPM    Atrial Rate 86 BPM    NE Interval 184 ms    QRS Duration 80 ms     ms    QTc 411 ms    P Axis 64 degrees    R AXIS 74 degrees    T Axis 22 degrees    Interpretation ECG       Sinus rhythm  Normal ECG  When compared with ECG of 19-Oct-2024 06:28,  Vent. rate has increased by  29 bpm  Confirmed by SEE ED PROVIDER NOTE FOR, ECG INTERPRETATION (4000),  TERESA SNOWDEN ([id number]) on 10/27/2024 4:23:55 AM     Wet prep    Collection Time: 10/25/24  8:57 PM    Specimen: Vagina; Swab   Result Value Ref Range    Trichomonas Absent Absent    Yeast Absent Absent    Clue Cells Absent Absent    WBCs/high power field None None   CBC (+ platelets, no diff)    Collection Time: 10/25/24  9:03 PM   Result Value Ref Range    WBC Count 7.6 4.0 - 11.0 10e3/uL    RBC Count 4.47 3.80 - 5.20 10e6/uL    Hemoglobin 12.3 11.7 - 15.7 g/dL    Hematocrit 37.1 35.0 - 47.0 %    MCV 83 78 - 100 fL    MCH 27.5 26.5 - 33.0 pg    MCHC 33.2 31.5 - 36.5 g/dL    RDW 13.5 10.0 - 15.0 %    Platelet Count 218 150 - 450 10e3/uL   Basic metabolic panel    Collection Time: 10/25/24  9:03 PM   Result Value Ref Range    Sodium 141 135 - 145 mmol/L    Potassium 3.7 3.4 - 5.3 mmol/L    Chloride 105  98 - 107 mmol/L    Carbon Dioxide (CO2) 23 22 - 29 mmol/L    Anion Gap 13 7 - 15 mmol/L    Urea Nitrogen 12.1 6.0 - 20.0 mg/dL    Creatinine 0.73 0.51 - 0.95 mg/dL    GFR Estimate >90 >60 mL/min/1.73m2    Calcium 8.9 8.8 - 10.4 mg/dL    Glucose 89 70 - 99 mg/dL   Hepatic function panel    Collection Time: 10/25/24  9:03 PM   Result Value Ref Range    Protein Total 7.4 6.4 - 8.3 g/dL    Albumin 4.6 3.5 - 5.2 g/dL    Bilirubin Total <0.2 <=1.2 mg/dL    Alkaline Phosphatase 55 40 - 150 U/L    AST 13 0 - 45 U/L    ALT 13 0 - 50 U/L    Bilirubin Direct <0.20 0.00 - 0.30 mg/dL   Lipase    Collection Time: 10/25/24  9:03 PM   Result Value Ref Range    Lipase 39 13 - 60 U/L   Troponin T, High Sensitivity    Collection Time: 10/25/24  9:03 PM   Result Value Ref Range    Troponin T, High Sensitivity <6 <=14 ng/L   D dimer quantitative    Collection Time: 10/25/24  9:03 PM   Result Value Ref Range    D-Dimer Quantitative <0.27 0.00 - 0.50 ug/mL FEU   ECG 12-LEAD WITH MUSE (LHE)    Collection Time: 10/28/24  6:07 PM   Result Value Ref Range    Systolic Blood Pressure  mmHg    Diastolic Blood Pressure  mmHg    Ventricular Rate 85 BPM    Atrial Rate 85 BPM    IN Interval 172 ms    QRS Duration 84 ms     ms    QTc 423 ms    P Axis 51 degrees    R AXIS 44 degrees    T Axis 49 degrees    Interpretation ECG       Sinus rhythm  Normal ECG  When compared with ECG of 25-Oct-2024 20:28,  No significant change was found  Confirmed by SEE ED PROVIDER NOTE FOR, ECG INTERPRETATION (4000),  TERESA SNOWDEN ([id number]) on 10/30/2024 2:27:35 AM     Neisseria gonorrhoeae PCR    Collection Time: 10/31/24  1:13 PM    Specimen: Urine, Voided   Result Value Ref Range    Neisseria gonorrhoeae Negative Negative   Chlamydia trachomatis PCR    Collection Time: 10/31/24  1:13 PM    Specimen: Urine, Voided   Result Value Ref Range    Chlamydia trachomatis Negative Negative   TSH with free T4 reflex    Collection Time: 10/31/24  2:11 PM   Result  Value Ref Range    TSH 1.86 0.30 - 4.20 uIU/mL   Lipid panel reflex to direct LDL    Collection Time: 10/31/24  2:11 PM   Result Value Ref Range    Cholesterol 135 <200 mg/dL    Triglycerides 166 (H) <150 mg/dL    Direct Measure HDL 40 (L) >=50 mg/dL    LDL Cholesterol Calculated 62 <100 mg/dL    Non HDL Cholesterol 95 <130 mg/dL    Patient Fasting > 8hrs? Unknown    HIV Antigen Antibody Combo Cascade    Collection Time: 10/31/24  2:11 PM   Result Value Ref Range    HIV Antigen Antibody Combo Nonreactive Nonreactive   Treponema Abs w Reflex to RPR and Titer    Collection Time: 10/31/24  2:11 PM   Result Value Ref Range    Treponema Antibody Total Nonreactive Nonreactive   UA with Microscopic reflex to Culture    Collection Time: 11/04/24 12:25 PM    Specimen: Urine, Clean Catch   Result Value Ref Range    Color Urine Yellow Colorless, Straw, Light Yellow, Yellow    Appearance Urine Slightly Cloudy (A) Clear    Glucose Urine Negative Negative mg/dL    Bilirubin Urine Negative Negative    Ketones Urine Negative Negative mg/dL    Specific Gravity Urine >1.030 1.003 - 1.035    Blood Urine Negative Negative    pH Urine 6.0 5.0 - 7.0    Protein Albumin Urine 30 (A) Negative mg/dL    Urobilinogen Urine 0.2 0.2, 1.0 mg/dL    Nitrite Urine Negative Negative    Leukocyte Esterase Urine Trace (A) Negative    Mucus Urine Present (A) None Seen /LPF    RBC Urine 5 (H) <=2 /HPF    WBC Urine 9 (H) <=5 /HPF    Squamous Epithelials Urine 32 (H) <=1 /HPF   HCG qualitative urine    Collection Time: 11/04/24 12:25 PM   Result Value Ref Range    hCG Urine Qualitative Negative Negative   Comprehensive metabolic panel    Collection Time: 11/04/24 12:58 PM   Result Value Ref Range    Sodium 139 135 - 145 mmol/L    Potassium 4.0 3.4 - 5.3 mmol/L    Carbon Dioxide (CO2) 21 (L) 22 - 29 mmol/L    Anion Gap 11 7 - 15 mmol/L    Urea Nitrogen 11.5 6.0 - 20.0 mg/dL    Creatinine 0.76 0.51 - 0.95 mg/dL    GFR Estimate >90 >60 mL/min/1.73m2     Calcium 9.7 8.8 - 10.4 mg/dL    Chloride 107 98 - 107 mmol/L    Glucose 93 70 - 99 mg/dL    Alkaline Phosphatase 59 40 - 150 U/L    AST 12 0 - 45 U/L    ALT 13 0 - 50 U/L    Protein Total 7.6 6.4 - 8.3 g/dL    Albumin 4.6 3.5 - 5.2 g/dL    Bilirubin Total 0.2 <=1.2 mg/dL   Lipase    Collection Time: 11/04/24 12:58 PM   Result Value Ref Range    Lipase 29 13 - 60 U/L   CBC with platelets and differential    Collection Time: 11/04/24 12:58 PM   Result Value Ref Range    WBC Count 8.1 4.0 - 11.0 10e3/uL    RBC Count 4.47 3.80 - 5.20 10e6/uL    Hemoglobin 12.6 11.7 - 15.7 g/dL    Hematocrit 35.9 35.0 - 47.0 %    MCV 80 78 - 100 fL    MCH 28.2 26.5 - 33.0 pg    MCHC 35.1 31.5 - 36.5 g/dL    RDW 13.0 10.0 - 15.0 %    Platelet Count 238 150 - 450 10e3/uL    % Neutrophils 69 %    % Lymphocytes 25 %    % Monocytes 5 %    % Eosinophils 1 %    % Basophils 1 %    % Immature Granulocytes 0 %    NRBCs per 100 WBC 0 <1 /100    Absolute Neutrophils 5.6 1.6 - 8.3 10e3/uL    Absolute Lymphocytes 2.0 0.8 - 5.3 10e3/uL    Absolute Monocytes 0.4 0.0 - 1.3 10e3/uL    Absolute Eosinophils 0.0 0.0 - 0.7 10e3/uL    Absolute Basophils 0.1 0.0 - 0.2 10e3/uL    Absolute Immature Granulocytes 0.0 <=0.4 10e3/uL    Absolute NRBCs 0.0 10e3/uL   Valproic acid    Collection Time: 11/10/24  8:33 AM   Result Value Ref Range    Valproic acid 41.9 (L)   ug/mL   UA with Microscopic reflex to Culture    Collection Time: 11/11/24  1:29 PM    Specimen: Urine, Midstream   Result Value Ref Range    Color Urine Yellow Colorless, Straw, Light Yellow, Yellow    Appearance Urine Clear Clear    Glucose Urine Negative Negative mg/dL    Bilirubin Urine Negative Negative    Ketones Urine 10 (A) Negative mg/dL    Specific Gravity Urine 1.029 1.003 - 1.035    Blood Urine Negative Negative    pH Urine 6.0 5.0 - 7.0    Protein Albumin Urine Negative Negative mg/dL    Urobilinogen Urine Normal Normal, 2.0 mg/dL    Nitrite Urine Negative Negative    Leukocyte Esterase  Urine Negative Negative    Mucus Urine Present (A) None Seen /LPF    RBC Urine 0 <=2 /HPF    WBC Urine 1 <=5 /HPF    Squamous Epithelials Urine <1 <=1 /HPF   Glucose by meter    Collection Time: 11/15/24  3:40 PM   Result Value Ref Range    GLUCOSE BY METER POCT 107 (H) 70 - 99 mg/dL   Glucose by meter    Collection Time: 11/17/24  4:19 PM   Result Value Ref Range    GLUCOSE BY METER POCT 80 70 - 99 mg/dL   Comprehensive metabolic panel    Collection Time: 11/19/24  8:17 AM   Result Value Ref Range    Sodium 138 135 - 145 mmol/L    Potassium 4.5 3.4 - 5.3 mmol/L    Carbon Dioxide (CO2) 21 (L) 22 - 29 mmol/L    Anion Gap 13 7 - 15 mmol/L    Urea Nitrogen 15.1 6.0 - 20.0 mg/dL    Creatinine 0.70 0.51 - 0.95 mg/dL    GFR Estimate >90 >60 mL/min/1.73m2    Calcium 9.1 8.8 - 10.4 mg/dL    Chloride 104 98 - 107 mmol/L    Glucose 90 70 - 99 mg/dL    Alkaline Phosphatase 59 40 - 150 U/L    AST 11 0 - 45 U/L    ALT 10 0 - 50 U/L    Protein Total 7.3 6.4 - 8.3 g/dL    Albumin 4.3 3.5 - 5.2 g/dL    Bilirubin Total 0.2 <=1.2 mg/dL   TSH with free T4 reflex    Collection Time: 11/19/24  8:17 AM   Result Value Ref Range    TSH 3.02 0.30 - 4.20 uIU/mL   CBC with platelets and differential    Collection Time: 11/19/24  8:17 AM   Result Value Ref Range    WBC Count 8.4 4.0 - 11.0 10e3/uL    RBC Count 4.51 3.80 - 5.20 10e6/uL    Hemoglobin 12.4 11.7 - 15.7 g/dL    Hematocrit 36.7 35.0 - 47.0 %    MCV 81 78 - 100 fL    MCH 27.5 26.5 - 33.0 pg    MCHC 33.8 31.5 - 36.5 g/dL    RDW 13.5 10.0 - 15.0 %    Platelet Count 213 150 - 450 10e3/uL    % Neutrophils 60 %    % Lymphocytes 32 %    % Monocytes 5 %    % Eosinophils 3 %    % Basophils 1 %    % Immature Granulocytes 0 %    NRBCs per 100 WBC 0 <1 /100    Absolute Neutrophils 5.0 1.6 - 8.3 10e3/uL    Absolute Lymphocytes 2.7 0.8 - 5.3 10e3/uL    Absolute Monocytes 0.4 0.0 - 1.3 10e3/uL    Absolute Eosinophils 0.2 0.0 - 0.7 10e3/uL    Absolute Basophils 0.1 0.0 - 0.2 10e3/uL    Absolute  Immature Granulocytes 0.0 <=0.4 10e3/uL    Absolute NRBCs 0.0 10e3/uL            Precautions:     Behavioral Orders   Procedures    Assault precautions    Code 1 - Restrict to Unit    DISCUS     High dose Abilify combined with Zyprexa. Need baseline    Routine Programming     As clinically indicated    Seizure precautions    Self Injury Precaution     scratching    Status 15     Every 15 minutes.    Status Individual Observation     Patient SIO status reviewed with team/RN.  Please also refer to RN/team documentation for add'l detail.    -SIO staff to monitor following which have contributed to patient being on SIO:  Patient is not stable and is at high risk for self harm.   -Possible interventions SIO staff could use to support patient's treatment progress:  Offer coping skills or medications.   -When following observed, team will review discontinuation of SIO:  Terminate when patient is able to follow direction and maintain safety.     Order Specific Question:   CONTINUOUS 24 hours / day     Answer:   Other     Order Specific Question:   Specify distance     Answer:   10 feet     Order Specific Question:   Indications for SIO     Answer:   Self-injury risk    Suicide precautions: Suicide Risk: HIGH     Patients on Suicide Precautions should have a Combination Diet ordered that includes a Diet selection(s) AND a Behavioral Tray selection for Safe Tray - with utensils, or Safe Tray - NO utensils       Order Specific Question:   Suicide Risk     Answer:   HIGH            Psychiatric Examination:   Temp: 98.2  F (36.8  C) Temp src: Temporal BP: 102/69 Pulse: 77   Resp: 16 SpO2: 98 % O2 Device: None (Room air)    Weight is 241 lbs 12.8 oz  Body mass index is 42.83 kg/m .    Appearance: awake, alert and adequately groomed  Attitude:  cooperative and evasive  Eye Contact:  good  Mood:  anxious and depressed  Affect:  appropriate and in normal range  Speech:  clear, coherent  Psychomotor Behavior:  no evidence of tardive  dyskinesia, dystonia, or tics  Throught Process:  linear and illogical  Associations:  no loose associations  Thought Content:  no evidence of suicidal ideation or homicidal ideation, no auditory hallucinations present, and no visual hallucinations present  Insight:  limited  Judgement:  limited  Oriented to:  time, person, and place  Attention Span and Concentration:  fair  Recent and Remote Memory:  fair           Precautions:     Behavioral Orders   Procedures    Assault precautions    Code 1 - Restrict to Unit    DISCUS     High dose Abilify combined with Zyprexa. Need baseline    Routine Programming     As clinically indicated    Seizure precautions    Self Injury Precaution     scratching    Status 15     Every 15 minutes.    Status Individual Observation     Patient SIO status reviewed with team/RN.  Please also refer to RN/team documentation for add'l detail.    -SIO staff to monitor following which have contributed to patient being on SIO:  Patient is not stable and is at high risk for self harm.   -Possible interventions SIO staff could use to support patient's treatment progress:  Offer coping skills or medications.   -When following observed, team will review discontinuation of SIO:  Terminate when patient is able to follow direction and maintain safety.     Order Specific Question:   CONTINUOUS 24 hours / day     Answer:   Other     Order Specific Question:   Specify distance     Answer:   10 feet     Order Specific Question:   Indications for SIO     Answer:   Self-injury risk    Suicide precautions: Suicide Risk: HIGH     Patients on Suicide Precautions should have a Combination Diet ordered that includes a Diet selection(s) AND a Behavioral Tray selection for Safe Tray - with utensils, or Safe Tray - NO utensils       Order Specific Question:   Suicide Risk     Answer:   HIGH          DIagnoses:   Borderline personality disorder  Intellectual disability  Suicidal ideation  Bipolar affective disorder,  per history  PTSD  Psychogenic nonepileptic seizures         Plan:   Medications:    -- Abilify Aristada intramuscular injection 882 mg every 28 days.  Next dose should be on 11/29  --Clonidine 0.1 mg at bedtime  -- Decrease Prozac to 40 mg, it might be increasing the impulsivity, on 11/18  -- Increase Zyprexa to 7. 5 mg at bedtime  -- Trazodone, 100 mg at bedtime  -- Depakote 500 mg , qhs  -- Hydroxyzine and Zyprexa are available as needed    Medical:  --Internal medicine to follow up for medical problems     --Lab work was reviewed.  Urinalysis is abnormal.  The rest of the lab work is WNL.  --Valproic acid, CBC, CMP, vit D, B12, Folate, TSH with T4. Mostly normal. Some results are pending.   --Urine culture was ordered    Consults:   --Care was coordinated with the treatment team.   --The patient was consulted on nature of illness and treatment options.     --SIO for safety    --No bed due to self harm by falling from it.      Disposition Plan   Reason for ongoing admission: poses an imminent risk to self  Discharge location:  TBD  Discharge Medications: not ordered  Follow-up Appointments: not scheduled  Legal Status: voluntary   Discharge will be granted once symptoms improved.    Contacts:  Medication Management/Psychiatry:  Name/Clinic: LINN Tong CNP at [facility]. Last visit was 11/6/24  Number: [phone]     Therapist:   Name/Clinic: Lynne Sawyer at [facility]   Number: [phone]     /ACT Team:  Name/Clinic: Pau Buck at [place]    Number:      Other contact information (family, friends, SO) and DANDY status: Yarely Ross (Mother) [phone]      Adrienne ESTEVES CNP    This note was created with the help of Dragon dictation system. All grammatical/typing errors or context distortion are unintentional and inherent to software.

## 2024-11-19 NOTE — PLAN OF CARE
"Individual Therapy Note      Date of Service: November 19, 2024    Patient: Sadaf goes by \"Sadaf,\" uses she/her pronouns    Individuals Present: Sadaf Diane Scottelmer, [facility]    Session start: 1200  Session end: 1213  Session duration in minutes: 13      Modality Used: CBT    Goals: Improve distress tolerance    Patient Description of current symptoms: angry     Mental Status Exam:   Attitude: uncooperative  Eye Contact: fair  Mood: angry  Affect: intensity is exaggerated and intensity is heightened  Speech: clear, coherent  Psychomotor Behavior: no evidence of tardive dyskinesia, dystonia, or tics  Thought Process:  illogical  Associations: no loose associations  Thought Content: passive suicidal ideation present  Insight: limited  Judgement: poor  Attention Span and Concentration: fair    Pt progress: Patient reported she was very angry because she made a bracelet in OT and was told she cannot wear it. She reported that she does not take \"no\" for an answer. She will bite herself. She wants to die because this bracelet is her life. Patient used a lot of profanity while talking and was not willing to make any changes. Writer validated patients feelings, but ended session due to patient wanting to stay in her current mood and not wanting to change.    Treatment Objective(s) Addressed:   The focus of this session was on processing feelings related to anger      Progress Towards Goals and Assessment of Patient:   Patient is not making progress towards treatment goals as evidenced by unwillingness to make changes and work on healthy coping skills.       Therapeutic Intervention(s):   Provided active listening, unconditional positive regard, and validation.   Engaged in guided discovery, explored patient's perspectives and helped expand them through socratic dialogue    Plan/next step: Meet if requested    No Charge (0-15 min)    Patient Active Problem List   Diagnosis    MENTAL HEALTH    Suicidal ideation    Suicidal " ideations    Intellectual disability    Bipolar affective disorder, currently depressed, moderate (H)    Borderline personality disorder (H)    Morbid obesity (H)    Unspecified mood (affective) disorder (H)    Chronic constipation    History of suicide attempt    Hyperhidrosis    Major depressive disorder, recurrent, in full remission (H)    Vitamin D deficiency    Syncope    Seizure-like activity (H)    Syncope, unspecified syncope type    Bipolar affective disorder (H)    Has access to planned means of suicide

## 2024-11-20 PROCEDURE — 250N000013 HC RX MED GY IP 250 OP 250 PS 637: Performed by: CLINICAL NURSE SPECIALIST

## 2024-11-20 PROCEDURE — 99232 SBSQ HOSP IP/OBS MODERATE 35: CPT | Performed by: CLINICAL NURSE SPECIALIST

## 2024-11-20 PROCEDURE — 128N000002 HC R&B CD/MH ADOLESCENT

## 2024-11-20 PROCEDURE — 250N000013 HC RX MED GY IP 250 OP 250 PS 637: Performed by: EMERGENCY MEDICINE

## 2024-11-20 PROCEDURE — H2032 ACTIVITY THERAPY, PER 15 MIN: HCPCS

## 2024-11-20 PROCEDURE — 250N000013 HC RX MED GY IP 250 OP 250 PS 637: Performed by: NURSE PRACTITIONER

## 2024-11-20 RX ADMIN — Medication 1 TABLET: at 07:49

## 2024-11-20 RX ADMIN — TRAZODONE HYDROCHLORIDE 100 MG: 100 TABLET ORAL at 20:34

## 2024-11-20 RX ADMIN — POLYETHYLENE GLYCOL 3350 17 G: 17 POWDER, FOR SOLUTION ORAL at 07:49

## 2024-11-20 RX ADMIN — DOCUSATE SODIUM 100 MG: 100 CAPSULE, LIQUID FILLED ORAL at 07:49

## 2024-11-20 RX ADMIN — LEVOTHYROXINE SODIUM 25 MCG: 25 TABLET ORAL at 07:49

## 2024-11-20 RX ADMIN — PANTOPRAZOLE SODIUM 40 MG: 20 TABLET, DELAYED RELEASE ORAL at 07:49

## 2024-11-20 RX ADMIN — DIVALPROEX SODIUM 500 MG: 500 TABLET, FILM COATED, EXTENDED RELEASE ORAL at 20:34

## 2024-11-20 RX ADMIN — DICYCLOMINE HYDROCHLORIDE 20 MG: 20 TABLET ORAL at 07:49

## 2024-11-20 RX ADMIN — CLONIDINE HYDROCHLORIDE 0.1 MG: 0.1 TABLET ORAL at 20:34

## 2024-11-20 RX ADMIN — FLUOXETINE HYDROCHLORIDE 40 MG: 40 CAPSULE ORAL at 07:49

## 2024-11-20 RX ADMIN — OLANZAPINE 5 MG: 5 TABLET, FILM COATED ORAL at 14:43

## 2024-11-20 RX ADMIN — DOCUSATE SODIUM 100 MG: 100 CAPSULE, LIQUID FILLED ORAL at 20:34

## 2024-11-20 RX ADMIN — DICYCLOMINE HYDROCHLORIDE 20 MG: 20 TABLET ORAL at 20:34

## 2024-11-20 RX ADMIN — HYDROXYZINE HYDROCHLORIDE 50 MG: 25 TABLET, FILM COATED ORAL at 14:43

## 2024-11-20 RX ADMIN — OLANZAPINE 7.5 MG: 5 TABLET, FILM COATED ORAL at 20:34

## 2024-11-20 RX ADMIN — Medication 25 MCG: at 07:49

## 2024-11-20 RX ADMIN — SENNOSIDES AND DOCUSATE SODIUM 1 TABLET: 50; 8.6 TABLET ORAL at 07:52

## 2024-11-20 RX ADMIN — OLANZAPINE 5 MG: 5 TABLET, FILM COATED ORAL at 11:12

## 2024-11-20 ASSESSMENT — ACTIVITIES OF DAILY LIVING (ADL)
ADLS_ACUITY_SCORE: 0
HYGIENE/GROOMING: INDEPENDENT
ADLS_ACUITY_SCORE: 0
LAUNDRY: UNABLE TO COMPLETE
ADLS_ACUITY_SCORE: 0
DRESS: SCRUBS (BEHAVIORAL HEALTH)
ADLS_ACUITY_SCORE: 0
ORAL_HYGIENE: INDEPENDENT
ADLS_ACUITY_SCORE: 0
ADLS_ACUITY_SCORE: 0
HYGIENE/GROOMING: INDEPENDENT

## 2024-11-20 NOTE — PLAN OF CARE
BEH IP Unit Acuity Rating Score (UARS)  Patient is given one point for every criteria they meet.    CRITERIA SCORING   On a 72 hour hold, court hold, committed, stay of commitment, or revocation. 0    Patient LOS on BEH unit exceeds 20 days. 0  LOS: 12   Patient under guardianship, 55+, otherwise medically complex, or under age 11. 0   Suicide ideation without relief of precipitating factors. 1   Current plan for suicide. 1   Current plan for homicide. 0   Imminent risk or actual attempt to seriously harm another without relief of factors precipitating the attempt. 0   Severe dysfunction in daily living (ex: complete neglect for self care, extreme disruption in vegetative function, extreme deterioration in social interactions). 1   Recent (last 7 days) or current physical aggression in the ED or on unit. 0   Restraints or seclusion episode in past 72 hours. 0   Recent (last 7 days) or current verbal aggression, agitation, yelling, etc., while in the ED or unit. 1 Last 11/19   Active psychosis. 0   Need for constant or near constant redirection (from leaving, from others, etc).  0   Intrusive or disruptive behaviors. 0   Patient requires 3 or more hours of individualized nursing care per 8-hour shift (i.e. for ADLs, meds, therapeutic interventions). 1   TOTAL 5

## 2024-11-20 NOTE — PLAN OF CARE
"Problem: Anxiety Signs/Symptoms  Goal: Improved Mood Symptoms (Anxiety Signs/Symptoms)  Outcome: Not Progressing   Goal Outcome Evaluation:       Vitals:  /75 (Patient Position: Sitting)   Pulse 70   Temp 98.8  F (37.1  C) (Tympanic)   Resp 18   Ht 1.6 m (5' 3\")   Wt 109.7 kg (241 lb 12.8 oz)   LMP  (LMP Unknown)   SpO2 100%   BMI 42.83 kg/m       Pain:  Pt reports a headache and stomach pain, however has high anxiety and does not appear to be in pain. Pt smiles and laughs intermittently with writer during assessment.    Appetite and sleep:  Pt's appetite is well. Pt reports she \"barely ate yesterday\", however says she had breakfast, lunch, and a bit of dinner. Pt reports having trouble sleeping due to \"constant pain\", however pt had 5 hours of sleep last night.    Medical:  No medical concerns.    Mental Health:  Pt is cooperative, med compliant, has bright affect with selected staff, otherwise withdrawn to self, listening to music with headphones in hallway and own room. Pt admits to SI/SIB thoughts that are a \"10/10\", 10 being the worst. Anxiety rated 9/10 and depression 9/10. Pt is scratching self and shows writer arm with pink scratches. No concerning wounds observed, as scratches are clean/dry/intact. When asked about her headache and stomach pain, pt states her pain intensity is the same \"everyday\" ever since her \"dad passed away\". Pt states that everyday she feels nauseous and a \"sharp stomach pain like needles\" along with the anxiety. Pt states \"everything is high. My anxiety, my depression, my suicidal ideation, my pain\". When writer asks about coping skills, pt presents her \"chew toy\" that she says helps with urges to bite herself, and says she \"plays cards\" to help as well. Pt attends music group this morning and said she \"had to leave because the music was too emotional\". Pt states missing her family, which increases her anxiety. Pt then insists to writer she is pre-diabetic, saying \"I'm " "always shaky\". She says her dad was diabetic and that a symptom of his was \"shakiness\". Pt advised to discuss with provider about her shakiness. Pt remains on 1:1 SIO. Will continue to monitor and assess, with SI/SIB precautions.     PRNs Given:  Miralax 17g. Senokot 1 tablet. Zyprexa 5 mg @1112  & 1445, Hydroxyzine 50 mg at 1445 per high anxiety and SI/SIB thoughts, was helpful for her symptoms.     2:49 PM Pt in room tearful biting on her hands, \"I don't want to live,\" pt says; unable to contract for safety; pt refusing any intervention or offer for snack/activity; PA staff obtain writer who makes pt laugh and starts playing cards prn medication given prior to reflection time for intrusive thoughts. Elopement, assault, suicide, seizure and self injury precautions and SIO staff  for safety.               "

## 2024-11-20 NOTE — PROGRESS NOTES
"[facility], [place]   Psychiatric Progress Note        Interim History:   The patient's care was discussed with the treatment team during the daily team meeting and/or staff's chart notes were reviewed.  Staff report patient is cooperative with taking her medications.     Psychiatric symptoms and interventions:     Provider discussed SIO in team meeting  and patient will continue on SIO due to safety concerns.   Patient's bed was removed due to her risk of intentionally falling out of bed. Patient's room was stripped due to patient expressing SIB urges and unsafe behavior. Patient has chronic SI and SIB urges at baseline.     Patient has been swearing at staff. Patient swears at provider. Patient said she does not like the word \"no\". Patient takes offense when boundaries are set with her.     Provider has concern that patient getting secondary gain from inpatient hospitalization. Patient reports certain staff are her best friends. Patient does want to engage in treatment. Patient is interested in a long hospital stay. Patient thinks she can return to home. Provider left 2 messages for mother to verify if patient can return to home.  No response. Patient said she will not go to an IRTs.     Medications were reviewed. Provider is simplifying medication regime. Mother and patient complained about taking \"so many meds.\"          Medications:     Current Facility-Administered Medications   Medication Dose Route Frequency Provider Last Rate Last Admin    [START ON 11/29/2024] ARIPiprazole lauroxil ER (ARISTADA) intra-muscular 882 mg  882 mg Intramuscular Q28 Days Adrienne Tejada APRN CNP        cloNIDine (CATAPRES) tablet 0.1 mg  0.1 mg Oral At Bedtime Mara Burrows APRN CNP   0.1 mg at 11/19/24 2032    dicyclomine (BENTYL) tablet 20 mg  20 mg Oral BID Ambrocio Ferro MD   20 mg at 11/20/24 0749    divalproex sodium extended-release (DEPAKOTE ER) 24 hr tablet 500 mg  500 mg " "Oral At Bedtime Naegele, Debra Ann, APRN CNS   500 mg at 11/19/24 2033    docusate sodium (COLACE) capsule 100 mg  100 mg Oral BID Ambrocio Ferro MD   100 mg at 11/20/24 0749    FLUoxetine (PROzac) capsule 40 mg  40 mg Oral Daily Adrienne Tejada APRN CNP   40 mg at 11/20/24 0749    levothyroxine (SYNTHROID/LEVOTHROID) tablet 25 mcg  25 mcg Oral Daily Ambrocio Ferro MD   25 mcg at 11/20/24 0749    miconazole (MICATIN) 2 % powder   Topical BID Leo Lowe MD   Given at 11/18/24 0807    multivitamin w/minerals (THERA-VIT-M) tablet 1 tablet  1 tablet Oral Daily Ambrocio Ferro MD   1 tablet at 11/20/24 0749    OLANZapine (zyPREXA) tablet 7.5 mg  7.5 mg Oral At Bedtime Adrienne Tejada APRN CNP   7.5 mg at 11/19/24 2033    pantoprazole (PROTONIX) EC tablet 40 mg  40 mg Oral Daily Ambrocio Ferro MD   40 mg at 11/20/24 0749    traZODone (DESYREL) tablet 100 mg  100 mg Oral At Bedtime Ambrocio Ferro MD   100 mg at 11/19/24 2032    Vitamin D3 (CHOLECALCIFEROL) tablet 25 mcg  25 mcg Oral Daily Ambrocio Ferro MD   25 mcg at 11/20/24 0749          Allergies:     Allergies   Allergen Reactions    Penicillins Rash and Unknown          Labs:   No results found for this or any previous visit (from the past 24 hours).       Psychiatric Examination:     /75 (Patient Position: Sitting)   Pulse 70   Temp 98.8  F (37.1  C) (Tympanic)   Resp 18   Ht 1.6 m (5' 3\")   Wt 109.7 kg (241 lb 12.8 oz)   LMP  (LMP Unknown)   SpO2 100%   BMI 42.83 kg/m    Weight is 241 lbs 12.8 oz  Body mass index is 42.83 kg/m .  Orthostatic Vitals       None            Appearance: awake, alert and adequately groomed  Attitude:  cooperative and evasive  Eye Contact:  good  Mood:  anxious and depressed  Affect:  appropriate and in normal range  Speech:  clear, coherent  Psychomotor Behavior:  no evidence of tardive dyskinesia, dystonia, or tics  Throught Process:  linear and illogical  Associations:  no loose " associations  Thought Content:  no evidence of suicidal ideation or homicidal ideation, no auditory hallucinations present, and no visual hallucinations present  Insight:  limited  Judgement:  limited  Oriented to:  time, person, and place  Attention Span and Concentration:  fair  Recent and Remote Memory:  fair       Clinical Global Impressions  First:  Considering your total clinical experience with this particular patient population, how severe are the patient's symptoms at this time?: 5 (11/13/24 1515)  Compared to the patient's condition at the START of treatment, this patient's condition is: 5 (11/13/24 1515)  Most recent:  Considering your total clinical experience with this particular patient population, how severe are the patient's symptoms at this time?: 5 (11/13/24 1515)  Compared to the patient's condition at the START of treatment, this patient's condition is: 5 (11/13/24 1515)         Precautions:     Behavioral Orders   Procedures    Assault precautions    Code 1 - Restrict to Unit    DISCUS     High dose Abilify combined with Zyprexa. Need baseline    Routine Programming     As clinically indicated    Seizure precautions    Self Injury Precaution     scratching    Status 15     Every 15 minutes.    Status Individual Observation     Patient SIO status reviewed with team/RN.  Please also refer to RN/team documentation for add'l detail.    -SIO staff to monitor following which have contributed to patient being on SIO:  Patient is not stable and is at high risk for self harm.   -Possible interventions SIO staff could use to support patient's treatment progress:  Offer coping skills or medications.   -When following observed, team will review discontinuation of SIO:  Terminate when patient is able to follow direction and maintain safety.     Order Specific Question:   CONTINUOUS 24 hours / day     Answer:   Other     Order Specific Question:   Specify distance     Answer:   10 feet     Order Specific  Question:   Indications for SIO     Answer:   Self-injury risk    Suicide precautions: Suicide Risk: HIGH     Patients on Suicide Precautions should have a Combination Diet ordered that includes a Diet selection(s) AND a Behavioral Tray selection for Safe Tray - with utensils, or Safe Tray - NO utensils       Order Specific Question:   Suicide Risk     Answer:   HIGH          DIagnoses:   Suicidal ideation  Self injurious behavior  Borderline personality disorder  Intellectual disability  Bipolar affective disorder, per history  PTSD  Psychogenic nonepileptic seizures         Plan:   Legal status: Voluntary      Medication management:   --Abilify Aristada intramuscular injection 882 mg every 28 days.  Next dose should be on 11/29  --Clonidine 0.1 mg at bedtime  -- Depakote 500 mg HS  -- Decrease Prozac to 60 mg, it might be increasing the impulsivity  -- Zyprexa, 7.5 mg at bedtime (increased)  -- Trazodone, 100 mg at bedtime  -- Hydroxyzine and Zyprexa are available as needed     Medical: Admit labs: unremarkable ,VPA 41.9  Ordered DISCUS due to high dose Abilify combined with Zyprexa.      Behavioral/psychology/social:  Encouraged patient to attend therapeutic hospital programming as tolerated   Patient would benefit from meeting with unit therapist on coping skills   Precautions: suicide, SIB, seizure  Patient is on SIO for safety     Disposition:   Reason for continued hospitalization: Patient expressing suicidal thinking and SIB urges. She is not safe for discharge.   Provider consulted with [facility] regarding contacting CM about outside placement. There is concern that patient will lose her waiver services with a long hospital stay.   Stabilization with medications, provide structured and supportive environment   Discharge medications: not ordered   Discharge plan: group home, mother is unable to care for patient at home.

## 2024-11-20 NOTE — PLAN OF CARE
Problem: Sleep Disturbance  Goal: Adequate Sleep/Rest  Outcome: Not Progressing   Goal Outcome Evaluation:  Focus: Shift summary    Data: Patient slept 5 hours last night. Awake intermittently during status 15 checks attempting to sleep; No interventions needed. Continues on 1:1 SIO for SI/SIB risk. Respirations even and unlabored on status 15 checks. Will continue to monitor and report to oncoming staff.    Response: Report sleep hours to day shift. Continue to monitor patient and provide therapeutic interventions as necessary.

## 2024-11-20 NOTE — PLAN OF CARE
Team Note Due:  Monday  Assessment/Intervention/Current Symptoms and Care Coordination  Chart review and met with team, discussed pt progress, symptomology, and response to treatment.  Discussed the discharge plan and any potential impediments to discharge. Pt slept 5 hours. Pt is reported to continue to experience thoughts of SI/SIB with plans ot scratch herself. Pt rates anxiety and depression 9/10 and has mild bianca from scratching on her arm.    Writer left a voicemail for Lesley Buck, pt's EMIGDIO CM regarding placement plans.    Discharge Plan or Goal  Pending stabilization & development of a safe discharge plan.    Dispo Plan:Still assessing  Discharge Date/ time: TBD  Transportation: TBD  Provisional Discharge: Yes[] No[x]    Barriers to Discharge   Patient requires further psychiatric stabilization due to current symptomology    Referral Status  Still assessing    Legal Status  Patient is voluntary        Contacts:  Medication Management/Psychiatry:  Name/Clinic: LINN Tong CNP at [facility]. Last visit was 11/6/24  Number: [phone]     Therapist:   Name/Clinic: Lynne Sawyer at [facility]   Number: [phone]     JAKEI :  Name/Clinic: Pau Buck at [facility]    Number: [phone]     PS[facility] Positive Support Services through Integrated [facility]: Cathie Lucas, [phone]    Lucina with [facility] ([facility])  [phone]    Other contact information (family, friends, SO) and DANDY status: Yarely Ross (Mother) [phone]       Upcoming Meetings and Dates/Important Information and next steps:  CTC to follow up with EMIGDIO Maurice Waiver Worker at [facility], to discuss care coordination and discharge planning, if no return call received.

## 2024-11-20 NOTE — PROGRESS NOTES
"Rehab Group     Start time: 1015  End time: 1145  Patient time total: 30 minutes     attended partial group     #4 attended   Group Type: music   Group Topic Covered: emotional regulation, healthy leisure time, relationship skills/support systems, and self-care      Group Session Detail: Morning relaxation/mindfulness group      Patient Response/Contribution:  cooperative with task, attentive, and actively engaged      Patient Detail:  Pt was a positive and active participant during first morning session.  Resonates with the songs \"One More Try\" by Wiley T, which pt states reminds her of her mom, who played the song for her when she was little, as well as the song \"Butterfly Kisses\" by Wilfredo Mujica.  Pt stated she likes the happy and sad aspects of the music, to make her feel.  1:1 staff present.  Pt presents as younger than her stated age.  Although she appeared regulated in group, her emotions appear to live right below the surface, as a baseline for pt.        Activity Therapy Per 15 min ()    Patient Active Problem List   Diagnosis    MENTAL HEALTH    Suicidal ideation    Suicidal ideations    Intellectual disability    Bipolar affective disorder, currently depressed, moderate (H)    Borderline personality disorder (H)    Morbid obesity (H)    Unspecified mood (affective) disorder (H)    Chronic constipation    History of suicide attempt    Hyperhidrosis    Major depressive disorder, recurrent, in full remission (H)    Vitamin D deficiency    Syncope    Seizure-like activity (H)    Syncope, unspecified syncope type    Bipolar affective disorder (H)    Has access to planned means of suicide       "

## 2024-11-21 VITALS
OXYGEN SATURATION: 99 % | WEIGHT: 241.8 LBS | BODY MASS INDEX: 42.84 KG/M2 | SYSTOLIC BLOOD PRESSURE: 108 MMHG | DIASTOLIC BLOOD PRESSURE: 68 MMHG | HEIGHT: 63 IN | TEMPERATURE: 97.6 F | RESPIRATION RATE: 18 BRPM | HEART RATE: 88 BPM

## 2024-11-21 PROCEDURE — 250N000013 HC RX MED GY IP 250 OP 250 PS 637: Performed by: CLINICAL NURSE SPECIALIST

## 2024-11-21 PROCEDURE — 250N000013 HC RX MED GY IP 250 OP 250 PS 637: Performed by: EMERGENCY MEDICINE

## 2024-11-21 PROCEDURE — 99233 SBSQ HOSP IP/OBS HIGH 50: CPT | Performed by: CLINICAL NURSE SPECIALIST

## 2024-11-21 PROCEDURE — 250N000013 HC RX MED GY IP 250 OP 250 PS 637: Performed by: NURSE PRACTITIONER

## 2024-11-21 PROCEDURE — 128N000002 HC R&B CD/MH ADOLESCENT

## 2024-11-21 RX ADMIN — TRAZODONE HYDROCHLORIDE 100 MG: 100 TABLET ORAL at 21:52

## 2024-11-21 RX ADMIN — HYDROXYZINE HYDROCHLORIDE 50 MG: 25 TABLET, FILM COATED ORAL at 13:16

## 2024-11-21 RX ADMIN — OLANZAPINE 5 MG: 5 TABLET, FILM COATED ORAL at 13:16

## 2024-11-21 RX ADMIN — PANTOPRAZOLE SODIUM 40 MG: 20 TABLET, DELAYED RELEASE ORAL at 07:57

## 2024-11-21 RX ADMIN — CLONIDINE HYDROCHLORIDE 0.1 MG: 0.1 TABLET ORAL at 21:51

## 2024-11-21 RX ADMIN — DICYCLOMINE HYDROCHLORIDE 20 MG: 20 TABLET ORAL at 21:51

## 2024-11-21 RX ADMIN — DOCUSATE SODIUM 100 MG: 100 CAPSULE, LIQUID FILLED ORAL at 07:57

## 2024-11-21 RX ADMIN — LEVOTHYROXINE SODIUM 25 MCG: 25 TABLET ORAL at 07:57

## 2024-11-21 RX ADMIN — Medication 25 MCG: at 07:57

## 2024-11-21 RX ADMIN — Medication 1 TABLET: at 07:57

## 2024-11-21 RX ADMIN — DIVALPROEX SODIUM 500 MG: 500 TABLET, FILM COATED, EXTENDED RELEASE ORAL at 21:51

## 2024-11-21 RX ADMIN — FLUOXETINE HYDROCHLORIDE 40 MG: 40 CAPSULE ORAL at 07:57

## 2024-11-21 RX ADMIN — DOCUSATE SODIUM 100 MG: 100 CAPSULE, LIQUID FILLED ORAL at 21:51

## 2024-11-21 RX ADMIN — DICYCLOMINE HYDROCHLORIDE 20 MG: 20 TABLET ORAL at 07:57

## 2024-11-21 RX ADMIN — OLANZAPINE 7.5 MG: 5 TABLET, FILM COATED ORAL at 21:52

## 2024-11-21 ASSESSMENT — ACTIVITIES OF DAILY LIVING (ADL)
ORAL_HYGIENE: INDEPENDENT
ADLS_ACUITY_SCORE: 0
ADLS_ACUITY_SCORE: 0
LAUNDRY: UNABLE TO COMPLETE
ADLS_ACUITY_SCORE: 0
DRESS: SCRUBS (BEHAVIORAL HEALTH)
ADLS_ACUITY_SCORE: 0
DRESS: SCRUBS (BEHAVIORAL HEALTH)
ADLS_ACUITY_SCORE: 0
HYGIENE/GROOMING: INDEPENDENT
ADLS_ACUITY_SCORE: 0

## 2024-11-21 NOTE — PLAN OF CARE
Problem: Sleep Disturbance  Goal: Adequate Sleep/Rest  Outcome: Not Progressing   Goal Outcome Evaluation:  Focus: Shift summary    Data: Patient slept 4.25 hours last night. Intermittently up to the bathroom throughout the night and had trouble getting back to sleep afterwards; No interventions needed. Respirations even and unlabored on status 15 checks. Will continue to monitor and report to oncoming staff.    Response: Report sleep hours to day shift. Continue to monitor patient and provide therapeutic interventions as necessary.

## 2024-11-21 NOTE — PLAN OF CARE
"Problem: Anxiety Signs/Symptoms  Goal: Improved Mood Symptoms (Anxiety Signs/Symptoms)  Outcome: Not Progressing   Goal Outcome Evaluation:     Plan of Care Reviewed With: patient       Vitals:  /69 (Patient Position: Sitting)   Pulse 79   Temp 98.2  F (36.8  C) (Oral)   Resp 18   Ht 1.6 m (5' 3\")   Wt 109.7 kg (241 lb 12.8 oz)   LMP  (LMP Unknown)   SpO2 99%   BMI 42.83 kg/m       Pain:  Pt does not appear to be in any physical pain.    Appetite and sleep:  Appetite is adequate, consumes 100% of breakfast and lunch. Pt states she did not sleep well last night, took nap today between 1000 and 1130.    Medical:  No medical concerns.    Mental Health:  Pt is cooperative and calm this morning, med compliant, continues to state that anxiety/depression/SI/SIB is high. Pt rates all psych symptoms 9/10, 10 being the worst. Pt's urges to scratch self are present. Pt offered PRN for pain and anxiety, pt refused and denies she needs any. Pt does not agree to contract for safety. Pt does not want to attend groups, but will only attend OT to \"make bracelets\" for her \"sister and mom\". Pt states she has trouble sleeping due to using the restroom \"5-6 times\" a night, and says she has a \"gallbladder infection\", however pt states she stays hydrated and drinks plenty of juice and water. Pt remains on a 1:1 SIO. No other concerns.    PRNs Given:           "

## 2024-11-21 NOTE — PLAN OF CARE
"Goal Outcome Evaluation:    Problem: Anxiety Signs/Symptoms  Goal: Optimized Energy Level (Anxiety Signs/Symptoms)  Outcome: Not Progressing         Pt was visibile in the milieu   Bunted affect   Pace the hallway with the headset on  Remains on SIO 1:1 for self-injury risk  Pt reports urges- encouraged to talk to staff and writer if it gets any stronger     Ate meal in her room  Socially isolative   VS stable   Pt reported headache 9/10 though refused any PRN  Stomach pain still the same     Pt reported being anxious about not being able to wear her won clothes and has been wanting to get of the scrubs    Medication compliant-taken after encouragement/education    Unusual behavior-head bang once  No PRN given    1900-pt told writer the urges to self harm was getting stronger.   Pt stated \"I messed it up with the provider today and told her to go away\"  \"They wanted to discharge me\". Thoughts/feelings acknowledged, Pt was informed she will talk to the provider tomorrow   Pt mentioned \"I feel like I am going to pass out\"  Pt refused any PRN as well as scheduled medication \"I am not taking those tonight\"    2200-pt woke up from a nap and was able to take scheduled evening medication       "

## 2024-11-21 NOTE — PLAN OF CARE
BEH IP Unit Acuity Rating Score (UARS)  Patient is given one point for every criteria they meet.    CRITERIA SCORING   On a 72 hour hold, court hold, committed, stay of commitment, or revocation. 0    Patient LOS on BEH unit exceeds 20 days. 0  LOS: 13   Patient under guardianship, 55+, otherwise medically complex, or under age 11. 0   Suicide ideation without relief of precipitating factors. 1   Current plan for suicide. 1   Current plan for homicide. 0   Imminent risk or actual attempt to seriously harm another without relief of factors precipitating the attempt. 0   Severe dysfunction in daily living (ex: complete neglect for self care, extreme disruption in vegetative function, extreme deterioration in social interactions). 1   Recent (last 7 days) or current physical aggression in the ED or on unit. 0   Restraints or seclusion episode in past 72 hours. 0   Recent (last 7 days) or current verbal aggression, agitation, yelling, etc., while in the ED or unit. 1 Last 11/19   Active psychosis. 0   Need for constant or near constant redirection (from leaving, from others, etc).  0   Intrusive or disruptive behaviors. 0   Patient requires 3 or more hours of individualized nursing care per 8-hour shift (i.e. for ADLs, meds, therapeutic interventions). 1   TOTAL 5

## 2024-11-21 NOTE — PROGRESS NOTES
"TREATMENT PLAN 11/21/2024    Patient will remain on SIO . Patient can not contract for safety.     Bed removed from room.  Mattress on floor. She was not being safe with bed. Concern she was intentionally falling out of bed.   Room is stripped due to her unsafe behaviors.   Patient can have safety blanket. No linens due to unsafe behaviors.   Patient is encouraged to meet with unit therapist to develop coping skills.   Staff conversation should be treatment centered. Concern that patient is interpreting staff as \"friends\". Patient is avoiding treatment and should be directed to treatment activities.     "

## 2024-11-21 NOTE — PROGRESS NOTES
"Pt upset, writer learns from staff that pt wants to leave, \"I want to sign myself out of here right now!! I am so mad at my provider!! Pt clearly upset and altered thought processing of information; pt given prn's and after a bit, calm in order to call her Mother; writer informs pt to inform her mother to call the unit so that we can connect her with provider. Pt informs mother that she wants to be discharged asap . \"The longer I am here Mom I am getting worse. I wish to go home.\" Pt says to Mom on the phone. Mom is at work but pt says that Mom agrees to call the unit station as soon as she can. Pt walking halls, expressing her frustration and staff facilitating expression of feelings. Pt advised to remain safe and continue with distraction techniques. Continue SIO and all other safety precautions for safety.  "

## 2024-11-21 NOTE — PLAN OF CARE
"  Problem: Suicide Risk  Goal: Absence of Self-Harm  Description: Pt will remain safe on the unit every shift as evidenced by taking scheduled medications, checking in with her primary nurse every shift, attend 50% of programming, identify and utilize 3 skills to improve coping with stressors.  Outcome: Progressing  Intervention: Establish Safety Plan and Continuity of Care  Recent Flowsheet Documentation  Taken 11/20/2024 7002 by Jaclyn Farley RN  Safe Transition Promotion: protective factors promoted     Problem: Anxiety Signs/Symptoms  Goal: Improved Somatic Symptoms (Anxiety Signs/Symptoms)  Outcome: Progressing   Goal Outcome Evaluation:     Plan of Care Reviewed With: patient   Pt was visible on the unit the most part of this evening. Reported anxiety and depression 9/10. Declined PRN. Reported chronic thoughts of harming self which she rated at 10. Plan to scratch self. Continued on SIO. Pt was strongly encouraged to report to staff if urge intensify. Socialization and group participation was encouraged. Pt presented with brighter affect. Interacts with staff. No visible sign of hallucination or paranoia noted. Approached pt with HS medication and she said \" I don't want them tonight\" provided education on the benefit of taking medication as prescribed. She was also commended for appropriate behavior this Pm. She took her medication. No aggressive behavior noted. Pt is monitored closely per unit protocol.      "

## 2024-11-21 NOTE — PROGRESS NOTES
CLINICAL NUTRITION SERVICES - REASSESSMENT NOTE     Nutrition Prescription    RECOMMENDATIONS FOR MDs/PROVIDERS TO ORDER:  None at this time    Malnutrition Status:    Patient does not meet two of the established criteria necessary for diagnosing malnutrition    Recommendations already ordered by Registered Dietitian (RD):  Continue current orders    Future/Additional Recommendations:  RD to sign off at this time, but will remain available by consult if new nutrition problem arises.       EVALUATION OF THE PROGRESS TOWARD GOALS   Diet: Regular  Supplement: Ginger ale w/ meals  Intake: eating 100% of meals recorded per flowsheets and RN notes     NEW FINDINGS   Per chart review, pt is eating 100% of meals and snacks with excellent hydration. No further concerns at this time.     Labs:  Reviewed     Medications:  Depakote ER, colace, prozac, MVI-M, zyprexa, protonix, vit D3, atarax prn, zofran prn, miralax prn, senokot-s prn    GI: no problems reported    Weights:  Wt Readings from Last 15 Encounters:   11/19/24 109.7 kg (241 lb 12.8 oz)   11/12/24 106.6 kg (235 lb)      11/08/24 106.9 kg (235 lb 11.2 oz)      10/28/24 111.6 kg (246 lb)   10/25/24 115.7 kg (255 lb)   10/19/24 115.7 kg (255 lb)   10/17/24 115.7 kg (255 lb)   10/17/24 115.7 kg (255 lb)   10/17/24 111.8 kg (246 lb 6.4 oz)   08/08/24 112.8 kg (248 lb 11.2 oz)   08/05/24 111.1 kg (245 lb)   07/24/24 112.5 kg (248 lb)   07/15/24 111.1 kg (245 lb)   06/26/24 113.4 kg (250 lb)   06/25/24 113.4 kg (250 lb)   06/17/24 116.8 kg (257 lb 8 oz)   06/13/24 112.9 kg (249 lb)   No weight loss noted since admission    MALNUTRITION  % Intake: No decreased intake noted  % Weight Loss: None noted  Subcutaneous Fat Loss: None observed  Muscle Loss: None observed  Fluid Accumulation/Edema: None noted  Malnutrition Diagnosis: Patient does not meet two of the established criteria necessary for diagnosing malnutrition    Previous Goals   Patient to consume % of  nutritionally adequate meal trays TID, or the equivalent with supplements/snacks.  Evaluation: Met    Previous Nutrition Diagnosis  Predicted inadequate nutrient intake (kcal/protein) related to poor appetite/recent weight loss  Evaluation: Resolved    CURRENT NUTRITION DIAGNOSIS  No nutrition diagnosis at this time     INTERVENTIONS  Implementation  Modify composition of meals/snacks    Goals  Patient to consume % of nutritionally adequate meal trays TID, or the equivalent with supplements/snacks.    Monitoring/Evaluation  RD to sign off at this time, but will remain available by consult if new nutrition problem arises.      Oliva Sorensen MPH, RDN, LD  Behavioral Health Adult & Pediatric Dietitian  BEH Clinical Dietitian [place], Teams, or Desk: [phone]  Weekend/Holiday Vocera: Weekend Holiday Clinical Dietitian [Multi Site Groups]

## 2024-11-21 NOTE — PROGRESS NOTES
"[facility], [place]   Psychiatric Progress Note        Interim History:   The patient's care was discussed with the treatment team during the daily team meeting and/or staff's chart notes were reviewed.  Staff report patient has been taking medications. At times she refuses but nursing has been able to convince her to take her medications.     Psychiatric symptoms and interventions:     Provider discussed SIO with treatment team. Patient has not been able to contract for safety and will remain on SIO. Patient did not engage in aggressive behavior yesterday.  She has bene sleeping most of the morning today. Provider attempted to meet with patient several times but patient would say \"leave\" and not answer any questions. She did not swear at provider today.     Patient attended one group yesterday. Facilitator made comment about patient that her emotions appear to live right blow the surface. Patient can be explosive. She has very low frustration  tolerance. She is very concrete in her thinking. She is resistant to talking about skills. Patient has made comment about wanting to stay in the hospital. Provider is concerned about secondary gain patient is getting form inpatient hospitalization vs malingering.      Patient not willing to talk about medications. Provider is simplifying medication regime. Mother and patient complained about taking \"so many meds.\"     Provider was able to verify with mother that patient can return to home. Mother was concerned about patient being home be self. Patient mirza have referral for [facility] worker. Plan to discharge to home next week after [facility] refrral has been made. Provider informed patient that she can return to home.          Medications:     Current Facility-Administered Medications   Medication Dose Route Frequency Provider Last Rate Last Admin    [START ON 11/29/2024] ARIPiprazole lauroxil ER (ARISTADA) intra-muscular 882 mg  882 mg Intramuscular Q28 " "Days Adrienne Tejada APRN CNP        cloNIDine (CATAPRES) tablet 0.1 mg  0.1 mg Oral At Bedtime MarjananaMara APRN CNP   0.1 mg at 11/20/24 2034    dicyclomine (BENTYL) tablet 20 mg  20 mg Oral BID Ambrocio Ferro MD   20 mg at 11/21/24 0757    divalproex sodium extended-release (DEPAKOTE ER) 24 hr tablet 500 mg  500 mg Oral At Bedtime Naegele, Debra Ann, APRN CNS   500 mg at 11/20/24 2034    docusate sodium (COLACE) capsule 100 mg  100 mg Oral BID Ambrocio Ferro MD   100 mg at 11/21/24 0757    FLUoxetine (PROzac) capsule 40 mg  40 mg Oral Daily Adrienne Tejada APRN CNP   40 mg at 11/21/24 0757    levothyroxine (SYNTHROID/LEVOTHROID) tablet 25 mcg  25 mcg Oral Daily Ambrocio Ferro MD   25 mcg at 11/21/24 0757    miconazole (MICATIN) 2 % powder   Topical BID Leo Lowe MD   Given at 11/18/24 0807    multivitamin w/minerals (THERA-VIT-M) tablet 1 tablet  1 tablet Oral Daily Ambrocio Ferro MD   1 tablet at 11/21/24 0757    OLANZapine (zyPREXA) tablet 7.5 mg  7.5 mg Oral At Bedtime Adrienne Tejada APRN CNP   7.5 mg at 11/20/24 2034    pantoprazole (PROTONIX) EC tablet 40 mg  40 mg Oral Daily Ambrocio Ferro MD   40 mg at 11/21/24 0757    traZODone (DESYREL) tablet 100 mg  100 mg Oral At Bedtime Ambrocio Ferro MD   100 mg at 11/20/24 2034    Vitamin D3 (CHOLECALCIFEROL) tablet 25 mcg  25 mcg Oral Daily Amborcio Ferro MD   25 mcg at 11/21/24 0757          Allergies:     Allergies   Allergen Reactions    Penicillins Rash and Unknown          Labs:   No results found for this or any previous visit (from the past 24 hours).       Psychiatric Examination:     /69 (Patient Position: Sitting)   Pulse 79   Temp 98.2  F (36.8  C) (Oral)   Resp 18   Ht 1.6 m (5' 3\")   Wt 109.7 kg (241 lb 12.8 oz)   LMP  (LMP Unknown)   SpO2 99%   BMI 42.83 kg/m    Weight is 241 lbs 12.8 oz  Body mass index is 42.83 kg/m .  Orthostatic Vitals       None              Appearance: awake, alert " and adequately groomed  Attitude:   evasive  Eye Contact:  poor  Mood:   depressed  Affect:  blunted  Speech:  clear, coherent  Psychomotor Behavior:  no evidence of tardive dyskinesia, dystonia, or tics  Throught Process:  linear and illogical  Associations:  no loose associations  Thought Content:  chronic suicidal ideation and SIB urges, no auditory hallucinations present, and no visual hallucinations present  Insight:  limited  Judgement:  limited  Oriented to:  time, person, and place  Attention Span and Concentration:  fair  Recent and Remote Memory:  fair    Clinical Global Impressions  First:  Considering your total clinical experience with this particular patient population, how severe are the patient's symptoms at this time?: 5 (11/13/24 1515)  Compared to the patient's condition at the START of treatment, this patient's condition is: 5 (11/13/24 1515)  Most recent:  Considering your total clinical experience with this particular patient population, how severe are the patient's symptoms at this time?: 5 (11/13/24 1515)  Compared to the patient's condition at the START of treatment, this patient's condition is: 5 (11/13/24 1515)         Precautions:     Behavioral Orders   Procedures    Assault precautions    Code 1 - Restrict to Unit    DISCUS     High dose Abilify combined with Zyprexa. Need baseline    Routine Programming     As clinically indicated    Seizure precautions    Self Injury Precaution     scratching    Status 15     Every 15 minutes.    Status Individual Observation     Patient SIO status reviewed with team/RN.  Please also refer to RN/team documentation for add'l detail.    -SIO staff to monitor following which have contributed to patient being on SIO:  Patient is not stable and is at high risk for self harm.   -Possible interventions SIO staff could use to support patient's treatment progress:  Offer coping skills or medications.   -When following observed, team will review discontinuation  "of SIO:  Terminate when patient is able to follow direction and maintain safety.     Order Specific Question:   CONTINUOUS 24 hours / day     Answer:   Other     Order Specific Question:   Specify distance     Answer:   10 feet     Order Specific Question:   Indications for SIO     Answer:   Self-injury risk    Suicide precautions: Suicide Risk: HIGH     Patients on Suicide Precautions should have a Combination Diet ordered that includes a Diet selection(s) AND a Behavioral Tray selection for Safe Tray - with utensils, or Safe Tray - NO utensils       Order Specific Question:   Suicide Risk     Answer:   HIGH          DIagnoses:   Suicidal ideation  Self injurious behavior  Borderline personality disorder  Intellectual disability  Rule out malingering  Bipolar affective disorder, per history  PTSD  Psychogenic nonepileptic seizures         Plan:     Expand All Collapse All    [facility], [place]   Psychiatric Progress Note         Interim History:   The patient's care was discussed with the treatment team during the daily team meeting and/or staff's chart notes were reviewed.  Staff report patient is cooperative with taking her medications.      Psychiatric symptoms and interventions:      Provider discussed SIO in team meeting  and patient will continue on SIO due to safety concerns.   Patient's bed was removed due to her risk of intentionally falling out of bed. Patient's room was stripped due to patient expressing SIB urges and unsafe behavior. Patient has chronic SI and SIB urges at baseline.      Patient has been swearing at staff. Patient swears at provider. Patient said she does not like the word \"no\". Patient takes offense when boundaries are set with her.      Provider has concern that patient getting secondary gain from inpatient hospitalization. Patient reports certain staff are her best friends. Patient does want to engage in treatment. Patient is interested in a long " "hospital stay. Patient thinks she can return to home. Provider left 2 messages for mother to verify if patient can return to home.  No response. Patient said she will not go to an IRTs.      Medications were reviewed. Provider is simplifying medication regime. Mother and patient complained about taking \"so many meds.\"           Medications:      Inpatient Administered Meds             Current Facility-Administered Medications   Medication Dose Route Frequency Provider Last Rate Last Admin    [START ON 11/29/2024] ARIPiprazole lauroxil ER (ARISTADA) intra-muscular 882 mg  882 mg Intramuscular Q28 Days Adrienne Tejada APRN CNP        cloNIDine (CATAPRES) tablet 0.1 mg  0.1 mg Oral At Bedtime Mara Burrows APRN CNP   0.1 mg at 11/19/24 2032    dicyclomine (BENTYL) tablet 20 mg  20 mg Oral BID Ambrocio Ferro MD   20 mg at 11/20/24 0749    divalproex sodium extended-release (DEPAKOTE ER) 24 hr tablet 500 mg  500 mg Oral At Bedtime Naegele, Debra Ann, APRN CNS   500 mg at 11/19/24 2033    docusate sodium (COLACE) capsule 100 mg  100 mg Oral BID Ambrocio Ferro MD   100 mg at 11/20/24 0749    FLUoxetine (PROzac) capsule 40 mg  40 mg Oral Daily Adrienne Tejada APRN CNP   40 mg at 11/20/24 0749    levothyroxine (SYNTHROID/LEVOTHROID) tablet 25 mcg  25 mcg Oral Daily Ambrocio Ferro MD   25 mcg at 11/20/24 0749    miconazole (MICATIN) 2 % powder   Topical BID Leo Lowe MD   Given at 11/18/24 0807    multivitamin w/minerals (THERA-VIT-M) tablet 1 tablet  1 tablet Oral Daily Ambrocio Ferro MD   1 tablet at 11/20/24 0749    OLANZapine (zyPREXA) tablet 7.5 mg  7.5 mg Oral At Bedtime Adrienne Tejada APRN CNP   7.5 mg at 11/19/24 2033    pantoprazole (PROTONIX) EC tablet 40 mg  40 mg Oral Daily Ambrocio Ferro MD   40 mg at 11/20/24 0749    traZODone (DESYREL) tablet 100 mg  100 mg Oral At Bedtime Ambrocio Ferro MD   100 mg at 11/19/24 2032    Vitamin D3 (CHOLECALCIFEROL) tablet 25 mcg  " "25 mcg Oral Daily Ambrocio Ferro MD   25 mcg at 11/20/24 0749              Allergies:      Allergies        Allergies   Allergen Reactions    Penicillins Rash and Unknown              Labs:   Recent Results   No results found for this or any previous visit (from the past 24 hours).          Psychiatric Examination:      /75 (Patient Position: Sitting)   Pulse 70   Temp 98.8  F (37.1  C) (Tympanic)   Resp 18   Ht 1.6 m (5' 3\")   Wt 109.7 kg (241 lb 12.8 oz)   LMP  (LMP Unknown)   SpO2 100%   BMI 42.83 kg/m    Weight is 241 lbs 12.8 oz  Body mass index is 42.83 kg/m .  Orthostatic Vitals         None                Appearance: awake, alert and adequately groomed  Attitude:  cooperative and evasive  Eye Contact:  good  Mood:  anxious and depressed  Affect:  appropriate and in normal range  Speech:  clear, coherent  Psychomotor Behavior:  no evidence of tardive dyskinesia, dystonia, or tics  Throught Process:  linear and illogical  Associations:  no loose associations  Thought Content:  no evidence of suicidal ideation or homicidal ideation, no auditory hallucinations present, and no visual hallucinations present  Insight:  limited  Judgement:  limited  Oriented to:  time, person, and place  Attention Span and Concentration:  fair  Recent and Remote Memory:  fair        Clinical Global Impressions  First:  Considering your total clinical experience with this particular patient population, how severe are the patient's symptoms at this time?: 5 (11/13/24 1515)  Compared to the patient's condition at the START of treatment, this patient's condition is: 5 (11/13/24 1515)  Most recent:  Considering your total clinical experience with this particular patient population, how severe are the patient's symptoms at this time?: 5 (11/13/24 1515)  Compared to the patient's condition at the START of treatment, this patient's condition is: 5 (11/13/24 1515)          Precautions:            Behavioral Orders   Procedures "    Assault precautions    Code 1 - Restrict to Unit    DISCUS       High dose Abilify combined with Zyprexa. Need baseline    Routine Programming       As clinically indicated    Seizure precautions    Self Injury Precaution       scratching    Status 15       Every 15 minutes.    Status Individual Observation       Patient SIO status reviewed with team/RN.  Please also refer to RN/team documentation for add'l detail.     -SIO staff to monitor following which have contributed to patient being on SIO:  Patient is not stable and is at high risk for self harm.   -Possible interventions SIO staff could use to support patient's treatment progress:  Offer coping skills or medications.   -When following observed, team will review discontinuation of SIO:  Terminate when patient is able to follow direction and maintain safety.       Order Specific Question:   CONTINUOUS 24 hours / day       Answer:   Other       Order Specific Question:   Specify distance       Answer:   10 feet       Order Specific Question:   Indications for SIO       Answer:   Self-injury risk    Suicide precautions: Suicide Risk: HIGH       Patients on Suicide Precautions should have a Combination Diet ordered that includes a Diet selection(s) AND a Behavioral Tray selection for Safe Tray - with utensils, or Safe Tray - NO utensils          Order Specific Question:   Suicide Risk       Answer:   HIGH           DIagnoses:   Suicidal ideation  Self injurious behavior  Borderline personality disorder  Intellectual disability  Bipolar affective disorder, per history  PTSD  Psychogenic nonepileptic seizures          Plan:   Legal status: Voluntary      Medication management:   --Abilify Aristada intramuscular injection 882 mg every 28 days.  Next dose should be on 11/29  --Clonidine 0.1 mg at bedtime  -- Depakote 500 mg HS  -- Decrease Prozac to 60 mg, it might be increasing the impulsivity  -- Zyprexa, 7.5 mg at bedtime (increased)  -- Trazodone, 100 mg at  bedtime  -- Hydroxyzine and Zyprexa are available as needed     Medical: Admit labs: unremarkable ,VPA 41.9  Ordered DISCUS due to high dose Abilify combined with Zyprexa.   DISCUS score 2- mild tongue tremor.      Behavioral/psychology/social:  Encouraged patient to attend therapeutic hospital programming as tolerated   Patient would benefit from meeting with unit therapist on coping skills   Precautions: suicide, SIB, seizure  Patient is on SIO for safety  Pls refer to treatment plan in treatment plan progress note.      Disposition:   Reason for continued hospitalization: Patient expressing suicidal thinking and SIB urges. She is not safe for discharge.   Provider consulted with [facility] regarding contacting CM about outside placement. There is concern that patient will lose her waiver services with a long hospital stay. (Over 120 days)  Stabilization with medications, provide structured and supportive environment   Discharge medications: not ordered   Discharge plan: Return to home with [facility] worker.

## 2024-11-21 NOTE — PROGRESS NOTES
"While on SIO with pt, pt refused to meet with provider or give information on a good time to call her mom. Once the provider left, pt got up and called mom. During call, pt repeatedly instructed the mom to ignore any calls from the provider claiming \"she's one of those people trying to make me leave as soon as possible\". Pt continued to make hostile remarks about provider and demanding the mom to not speak to the provider at all so that pt won't have to discharge.  "

## 2024-11-21 NOTE — PLAN OF CARE
Team Note Due:  Monday  Assessment/Intervention/Current Symptoms and Care Coordination  Chart review and met with team, discussed pt progress, symptomology, and response to treatment.  Discussed the discharge plan and any potential impediments to discharge. Pt slept 4.25 hours. Pt is reported to continue to experience thoughts of SI/SIB with plans ot scratch herself. Pt rates anxiety and depression 9/10.    Writer attempted to call pt's [facility] CM and it again went straight to VM. Writer called [facility] CM's supervisor Jannette and left a VM regarding inquisition of placement options. CADI CM called &  left a VM stating pt is not interested in [facility] housing services. Writer left CADI a final VM asking her to connect with pt regarding placement options.     Writer later met with pt who reported that provider spoke with mom and she agreed to have pt back home if [facility] referral can be made to reduce isolation at home. Pt is also interested in joining [facility] to attend activities during the day. Writer sent a Care Coordinator request for [facility] services and completed an online application for [employer].       Discharge Plan or Goal  Pending stabilization & development of a safe discharge plan.    Dispo Plan:Still assessing  Discharge Date/ time: TBD  Transportation: TBD  Provisional Discharge: Yes[] No[x]    Barriers to Discharge   Patient requires further psychiatric stabilization due to current symptomology    Referral Status  [facility] 11/21  [facility] -11/21    Legal Status  Patient is voluntary        Contacts:  Medication Management/Psychiatry:  Name/Clinic: LINN Tong CNP at [facility]. Last visit was 11/6/24  Number: [phone]     Therapist:   Name/Clinic: Lynne Sawyer at [facility]   Number: [phone]     EMIGDIO :  Name/Clinic: Pau Buck at [place]    Number: [phone]   Supervisor: Nasim [phone]    [facility] Positive Support  Services through Integrated Living: Cathie Lucas, [phone]    Lucina with [facility] Care Coordination ([facility])  [phone]    Other contact information (family, friends, SO) and DANDY status: Yarely Ross (Mother) [phone]       Upcoming Meetings and Dates/Important Information and next steps:  CTC to follow up with PERFECTO and Barbie Alfaro

## 2024-11-22 VITALS
RESPIRATION RATE: 18 BRPM | HEART RATE: 87 BPM | SYSTOLIC BLOOD PRESSURE: 115 MMHG | OXYGEN SATURATION: 98 % | WEIGHT: 241.8 LBS | BODY MASS INDEX: 42.84 KG/M2 | TEMPERATURE: 97.9 F | HEIGHT: 63 IN | DIASTOLIC BLOOD PRESSURE: 76 MMHG

## 2024-11-22 PROCEDURE — 99239 HOSP IP/OBS DSCHRG MGMT >30: CPT | Performed by: CLINICAL NURSE SPECIALIST

## 2024-11-22 PROCEDURE — 250N000013 HC RX MED GY IP 250 OP 250 PS 637: Performed by: NURSE PRACTITIONER

## 2024-11-22 PROCEDURE — 250N000013 HC RX MED GY IP 250 OP 250 PS 637: Performed by: EMERGENCY MEDICINE

## 2024-11-22 PROCEDURE — 250N000011 HC RX IP 250 OP 636: Performed by: EMERGENCY MEDICINE

## 2024-11-22 RX ORDER — OLANZAPINE 10 MG/2ML
5 INJECTION, POWDER, FOR SOLUTION INTRAMUSCULAR 3 TIMES DAILY PRN
Status: DISCONTINUED | OUTPATIENT
Start: 2024-11-22 | End: 2024-11-22 | Stop reason: HOSPADM

## 2024-11-22 RX ORDER — OLANZAPINE 10 MG/2ML
10 INJECTION, POWDER, FOR SOLUTION INTRAMUSCULAR 3 TIMES DAILY PRN
Status: DISCONTINUED | OUTPATIENT
Start: 2024-11-22 | End: 2024-11-22

## 2024-11-22 RX ORDER — OLANZAPINE 5 MG/1
5 TABLET ORAL DAILY PRN
Qty: 15 TABLET | Refills: 0 | Status: SHIPPED | OUTPATIENT
Start: 2024-11-22

## 2024-11-22 RX ORDER — OLANZAPINE 5 MG/1
5 TABLET ORAL 3 TIMES DAILY PRN
Status: DISCONTINUED | OUTPATIENT
Start: 2024-11-22 | End: 2024-11-22

## 2024-11-22 RX ORDER — ONDANSETRON 8 MG/1
8 TABLET, FILM COATED ORAL EVERY 8 HOURS PRN
Qty: 30 TABLET | Refills: 0 | Status: SHIPPED | OUTPATIENT
Start: 2024-11-22

## 2024-11-22 RX ORDER — OLANZAPINE 5 MG/1
5 TABLET ORAL 3 TIMES DAILY PRN
Qty: 10 TABLET | Refills: 0 | Status: SHIPPED | OUTPATIENT
Start: 2024-11-22 | End: 2024-11-25

## 2024-11-22 RX ORDER — FLUOXETINE 40 MG/1
40 CAPSULE ORAL DAILY
Qty: 30 CAPSULE | Refills: 0 | Status: SHIPPED | OUTPATIENT
Start: 2024-11-23

## 2024-11-22 RX ORDER — HYDROXYZINE HYDROCHLORIDE 25 MG/1
25-50 TABLET, FILM COATED ORAL EVERY 4 HOURS PRN
Qty: 30 TABLET | Refills: 0 | Status: SHIPPED | OUTPATIENT
Start: 2024-11-22

## 2024-11-22 RX ORDER — CLONIDINE HYDROCHLORIDE 0.1 MG/1
0.1 TABLET ORAL AT BEDTIME
Qty: 30 TABLET | Refills: 0 | Status: SHIPPED | OUTPATIENT
Start: 2024-11-22

## 2024-11-22 RX ORDER — DIVALPROEX SODIUM 500 MG/1
500 TABLET, FILM COATED, EXTENDED RELEASE ORAL AT BEDTIME
Qty: 30 TABLET | Refills: 0 | Status: SHIPPED | OUTPATIENT
Start: 2024-11-22

## 2024-11-22 RX ORDER — OLANZAPINE 5 MG/1
5 TABLET ORAL DAILY PRN
Status: DISCONTINUED | OUTPATIENT
Start: 2024-11-22 | End: 2024-11-22 | Stop reason: HOSPADM

## 2024-11-22 RX ORDER — PANTOPRAZOLE SODIUM 40 MG/1
40 TABLET, DELAYED RELEASE ORAL DAILY
Qty: 30 TABLET | Refills: 0 | Status: SHIPPED | OUTPATIENT
Start: 2024-11-23

## 2024-11-22 RX ORDER — OLANZAPINE 7.5 MG/1
7.5 TABLET, FILM COATED ORAL AT BEDTIME
Qty: 30 TABLET | Refills: 0 | Status: SHIPPED | OUTPATIENT
Start: 2024-11-22

## 2024-11-22 RX ADMIN — PANTOPRAZOLE SODIUM 40 MG: 20 TABLET, DELAYED RELEASE ORAL at 08:21

## 2024-11-22 RX ADMIN — Medication 25 MCG: at 08:22

## 2024-11-22 RX ADMIN — DICYCLOMINE HYDROCHLORIDE 20 MG: 20 TABLET ORAL at 08:22

## 2024-11-22 RX ADMIN — FLUOXETINE HYDROCHLORIDE 40 MG: 40 CAPSULE ORAL at 08:22

## 2024-11-22 RX ADMIN — DOCUSATE SODIUM 100 MG: 100 CAPSULE, LIQUID FILLED ORAL at 08:22

## 2024-11-22 RX ADMIN — ONDANSETRON HYDROCHLORIDE 8 MG: 4 TABLET, FILM COATED ORAL at 09:19

## 2024-11-22 RX ADMIN — Medication 1 TABLET: at 08:22

## 2024-11-22 RX ADMIN — LEVOTHYROXINE SODIUM 25 MCG: 25 TABLET ORAL at 08:22

## 2024-11-22 ASSESSMENT — ACTIVITIES OF DAILY LIVING (ADL)
ADLS_ACUITY_SCORE: 0

## 2024-11-22 NOTE — PLAN OF CARE
Patient discharging 11/22/2024 accompanied by the  and destination is home.    Discharge paperwork and medications reviewed with the pt and gave to the  to give to her sister per AM nurse report. Pt escorted by security and 3 staff.  Pt called her sister to wait for her at home before she left the hospital. Pt left the unit around 1730     Copies provided: AVS to the pt      CARE PLAN COMPLETED:     EDUCATION COMPLETED:

## 2024-11-22 NOTE — PLAN OF CARE
"Goal Outcome Evaluation:    Pt is relatively calm and coop, pleasant. Her mood is \"disturbed because I had a hard time sleeping.\" She reported difficulty staying asleep, last night. Pt rates depression and anxiety both 9/10; also states she has manic feelings and racing thoughts. Pt endorses si and sib thoughts, no plan or intent. She c/o stomach discomfort rated 9/10, and had a small emesis. Pt felt better after Zofran given. She states she goes to OT and music grps, otherwise keeps mainly to self; sio staff present. Pt also said she may nap, today. She is to be discharging this afternoon/evening, and mom is to pick her up. Her goal for the day is \"to try to look on the bright side.\"    6220) Pt has had a good shift, overall. She has various requests, and is looking forward to discharge. No acute behavioral concerns.                      "

## 2024-11-22 NOTE — PLAN OF CARE
Care Coordinator scheduled the following appointment:     [facility] Intake appointment: Thursday, December 12, 2024 @ 11:30am via Phone  Provider: Nette Beth [facility]  Address: [address], [address], [place] [address]  Phone: [phone] Fax: [phone]  Notes: At the time of your appointment, you will receive a phone call @ [phone]. All subsequent appointments will be in-person at your residence. If you prefer a Telehealth/video visit for this appointment, please call the clinic with an updated email address.     CC updated CTC and AVS

## 2024-11-22 NOTE — PLAN OF CARE
Team Note Due:  Monday  Assessment/Intervention/Current Symptoms and Care Coordination  Chart review and met with team, discussed pt progress, symptomology, and response to treatment.  Discussed the discharge plan and any potential impediments to discharge.    Writer met with pt, she reported feeling excited to go home and ready to leave the unit asap. She stated [facility] called her and set up a time to do a tour of their Psychiatric. Writer informed her that her appointments with [facility] were set, she had to be assigned a new therapist as she hadn't seen Lynne Sawyer in months, this info was placed in her AVS. Care Coordinators set her intake for [facility] with Treva, that info is on her AVS as well.     Discharge Plan or Goal  Home with [facility] and other OP supports    Dispo Plan: Home with [facility] and other OP supports  Discharge Date/ time: 11/22 at 3pm  Transportation: cab  Provisional Discharge: Yes[] No[x]    Barriers to Discharge   Patient requires further psychiatric stabilization due to current symptomology    Referral Status  [facility] 11/21  [facility] -11/21    Legal Status  Patient is voluntary        Contacts:  Medication Management/Psychiatry:  Name/Clinic: LINN Tong CNP at [facility]. Last visit was 11/6/24  Number: [phone]     CADI :  Name/Clinic: Pau Buck at [address]    Number: [phone]   Supervisor: Nasim [phone]    [facility] through [facility]: Cathie Lucas, [phone]    Lucina with [facility] ([facility])  [phone]    Other contact information (family, friends, SO) and DANDY status: Yarely Ross (Mother) [phone]       Upcoming Meetings and Dates/Important Information and next steps:

## 2024-11-22 NOTE — PLAN OF CARE
BEH IP Unit Acuity Rating Score (UARS)  Patient is given one point for every criteria they meet.    CRITERIA SCORING   On a 72 hour hold, court hold, committed, stay of commitment, or revocation. 0    Patient LOS on BEH unit exceeds 20 days. 0  LOS: 14   Patient under guardianship, 55+, otherwise medically complex, or under age 11. 0   Suicide ideation without relief of precipitating factors. 1   Current plan for suicide. 0   Current plan for homicide. 0   Imminent risk or actual attempt to seriously harm another without relief of factors precipitating the attempt. 0   Severe dysfunction in daily living (ex: complete neglect for self care, extreme disruption in vegetative function, extreme deterioration in social interactions). 1   Recent (last 7 days) or current physical aggression in the ED or on unit. 0   Restraints or seclusion episode in past 72 hours. 0   Recent (last 7 days) or current verbal aggression, agitation, yelling, etc., while in the ED or unit. 1 Last 11/19   Active psychosis. 0   Need for constant or near constant redirection (from leaving, from others, etc).  0   Intrusive or disruptive behaviors. 0   Patient requires 3 or more hours of individualized nursing care per 8-hour shift (i.e. for ADLs, meds, therapeutic interventions). 1   TOTAL 4

## 2024-11-22 NOTE — DISCHARGE SUMMARY
"Psychiatric Discharge Summary    Sadaf Ross MRN# [id number]   Age: 24 year old YOB: 2000     Date of Admission:  11/4/2024  Date of Discharge:  11/22/2024  Admitting Physician:  Pk Calabrese MD  Discharge Physician:  Debra A. Naegele, APRN CNS (Contact: [phone])         Event Leading to Hospitalization:   Sadaf is a 24-year-old female admitted to station 6A for a reported increase in suicidal ideation as well as urges for self-harm. Recent increased mood dysregulation. Patient has a very significant psychiatric history with diagnoses to include: borderline personality disorder, ADHD, PTSD. Presents as ID.  Patient currently living with family however has a history of group home placement.  Patient known to unit staff and has history of complex care plan.  Patient has established outpatient clinicians. History of chronic suicidal ideation and self-harm. History of necessitating physical restraints while hospitalized and SIO.      Chart review: As per DEC assessment dated 11/4/24: Sadaf Ross presents to the ED via EMS. Patient is presenting to the ED for the following concerns: Anxiety, Suicidal ideation, Worsening psychosocial stress.   Factors that make the mental health crisis life threatening or complex are:  Pt presents to [facility] ED via EMS from home for a mental health assessment due to concerns for suicidal ideation with a plan to cut herself or do whatever she needs to do to end her life. She reports she feels like its getting worse, because she was \"up for  24 hours thinking about suicide the whole time, feeling very anxious, with a racing heart, dizzy, feeling nauseous and like she was going to vomit, with really blurry vision.\" Pt states often, \"I just want to kill myself, its just not worth it anymore, I want to end it.\" Pt feels like her medications are no longer working for her to help her sleep and to help her feel better mentally and emotionally. She states that " "they changed a lot of her meds recently, because she as \"doubled-up on multiple medications\". Pt presented labile during the assessment, and at one moment was slyly smiling at writer and at another moment, got upset really quickly. Prior to that she was able to describe a bit more of what happened that brought her in. She reports that she had a pocket knife from one of her exes and she's had it for awhile and hasn't hurt herself until her thoughts of it being so overwhelming now. Pt reports that she has had a lot of urges for SIB, specifically biting herself, but that she has been trying hard to resist it. She reports the last time she bit herself was yesterday. Pt does not endorse any HI, stating \"I never would hurt anybody else, I'm not that kind of person, I just want to kill myself.\" Pt reports 8-9 suicide attempts in her lifetime by cutting on her forearm or neck. It is unclear when her last attempt was. Pt does not endorse AH/VH.      Pt reports that she has been living back at home for awhile now. Her mother, mother's boyfriend, sister, sister's boyfriend, their child and 2 dogs and 2 cats live in the house. Pt says she has a Positive Support Person (or PSP) who comes to the house 2 days a week. It sounds like all of the other adults working in the house. Pt's mother or sister separate pts meds and hand them to her to take them. However, do not watch to see if she takes them. Pt reports she is working with her , Lesley at [place], to get an apartment of her own, but it is taking forever. When trying to identify any other causes of pt's recent dysregulation and issues and increasing issues with her mental health that led to her to coming into the ED three times in the last week, pt began to get upset and threaten to remove her IV stating \"don't make me take out my IV, you are making me angry, I'm gonna do it.\" This seemed to come out of nowhere. Writer asked pt not to remove her IV and if she " "continued to mess with the IV tape the assessment would end, and writer is just trying to get a sense of how the ED team could best support her. Pt continued to mess with the tape and writer opened the door and asked the pt to head back to her hallway bed. She continued to escalate and threaten to remove the IV at her hallway bed, a nurse came and helped her remove and determine she did not need additional fluids.      As per ED psychiatric assessment dated 11/5/24:  Sadaf Ross is a 24 year old female with history notable for borderline personality disorder, ADHD, PTSD, chronic suicidal ideation, and frequent ED visits who presented to the ED on 11/4/24 via EMS for suicidal ideation with a plan to slit her wrist with a pocket knife. She reportedly started the day feeling fine and spent the morning with her  but then developed unsteadiness and GI symptoms. She has a historical diagnosis of bipolar disorder. On examination, Sadaf was lying down in bed but agreed to ambulate to the consult room to talk. She had no difficulty walking and was not noted to be unsteady on her feet. She reports that she feels \"the same\" and clarified that she is still suicidal, and has been for the past 24 hours. She endorses having attempted suicide in the past but cannot recall when the last time was. Records indicate that her mother said that she has frequently threatened suicide, specifically by overdose, but has no history of acting on it. She denies homicidal thoughts, saying that \"I have a big heart. I don't like to hurt people. I'm very kind.\" She does endorse verbal aggression though when she gets angry, but this does not devolve into physical aggression.     She reports that sleep is \"like crap\" and she has not slept at all at home prior to arrival, and slept poorly last night. Appetite is variable, and she barely ate today. Mood is depressed and suicidal. She denies substance use. No UDS was available. " She does not appear to be attending to internal stimuli and did not endorse hallucinations. She denies knowledge of stressors that may have led to her current despondent state.      She reports thyroid issues although TSH from 10/31/24 was WNL at 1.86. She also reports seizures that started when she was 23 years old and happens with some regularity approximately monthly. She was unable to characterize what happens during the seizure, but states that she can sometimes tell that it is going to happen because she gets a headache or gets dizzy. She reports loss of consciousness for the 20-25 minutes that she is having a spell. She was not able to describe her post-ictal state. Of note, her seizures are triggered by stress.      EEG done on 10/18/24:  IMPRESSION: This EEG study is normal during wakefulness and sleep without any interictal epileptiform discharges, electrographic or clinical seizures, and paroxysmal behavioral events.  Please note that normal EEG does not rule out the diagnosis of epilepsy.  Clinical correlation is recommended.  Luis Alberto Carrillo MD.     As per ED psychiatric assessment dated 11/7/24: Sadaf Ross is a 24 year old female with history of borderline pd, adhd, ptsd here with SI and a plan to cut her wrists. She came in 11/4. She has been waiting for an inpatient bed since and has been declined a number of times, most recently by 6A today due to unit acuity. DEC/EC contacted patient's mother regarding safety planning. Patient's mother reported that they cannot make the environment safe for patient at this time. Spoke with patient about being in the ED for an extended period of time. She says she has been in communication with her mom and her mom wants her to stay therefore she wants to stay. She has been getting medications here, she had been off of them for a few days prior to admission. Patient was put on a 72 hour hold on 11/4, was dysregulated and trying to leave at the time.  That hold was removed yesterday. She says she wants to be admitted inpatient because her mom feels she should be. She also says she wants to go soon so she can  her new glasses. When asked about SI she evaded the subject, asked several times but she did not answer. She has been medication compliant here.           See Admission note by Ivana Smyth APRN CNP  on 11/9/2024  8:44 AM  for additional details.          DIagnoses:     Borderline personality disorder  Intellectual disability  Rule out malingering  Bipolar affective disorder, per history  PTSD  Psychogenic nonepileptic seizures         Labs:     Results for orders placed or performed during the hospital encounter of 11/04/24   Comprehensive metabolic panel     Status: Abnormal   Result Value Ref Range    Sodium 139 135 - 145 mmol/L    Potassium 4.0 3.4 - 5.3 mmol/L    Carbon Dioxide (CO2) 21 (L) 22 - 29 mmol/L    Anion Gap 11 7 - 15 mmol/L    Urea Nitrogen 11.5 6.0 - 20.0 mg/dL    Creatinine 0.76 0.51 - 0.95 mg/dL    GFR Estimate >90 >60 mL/min/1.73m2    Calcium 9.7 8.8 - 10.4 mg/dL    Chloride 107 98 - 107 mmol/L    Glucose 93 70 - 99 mg/dL    Alkaline Phosphatase 59 40 - 150 U/L    AST 12 0 - 45 U/L    ALT 13 0 - 50 U/L    Protein Total 7.6 6.4 - 8.3 g/dL    Albumin 4.6 3.5 - 5.2 g/dL    Bilirubin Total 0.2 <=1.2 mg/dL   Lipase     Status: Normal   Result Value Ref Range    Lipase 29 13 - 60 U/L   UA with Microscopic reflex to Culture     Status: Abnormal    Specimen: Urine, Clean Catch   Result Value Ref Range    Color Urine Yellow Colorless, Straw, Light Yellow, Yellow    Appearance Urine Slightly Cloudy (A) Clear    Glucose Urine Negative Negative mg/dL    Bilirubin Urine Negative Negative    Ketones Urine Negative Negative mg/dL    Specific Gravity Urine >1.030 1.003 - 1.035    Blood Urine Negative Negative    pH Urine 6.0 5.0 - 7.0    Protein Albumin Urine 30 (A) Negative mg/dL    Urobilinogen Urine 0.2 0.2, 1.0 mg/dL    Nitrite Urine  Negative Negative    Leukocyte Esterase Urine Trace (A) Negative    Mucus Urine Present (A) None Seen /LPF    RBC Urine 5 (H) <=2 /HPF    WBC Urine 9 (H) <=5 /HPF    Squamous Epithelials Urine 32 (H) <=1 /HPF    Narrative    Urine Culture not indicated   HCG qualitative urine     Status: Normal   Result Value Ref Range    hCG Urine Qualitative Negative Negative   CBC with platelets and differential     Status: None   Result Value Ref Range    WBC Count 8.1 4.0 - 11.0 10e3/uL    RBC Count 4.47 3.80 - 5.20 10e6/uL    Hemoglobin 12.6 11.7 - 15.7 g/dL    Hematocrit 35.9 35.0 - 47.0 %    MCV 80 78 - 100 fL    MCH 28.2 26.5 - 33.0 pg    MCHC 35.1 31.5 - 36.5 g/dL    RDW 13.0 10.0 - 15.0 %    Platelet Count 238 150 - 450 10e3/uL    % Neutrophils 69 %    % Lymphocytes 25 %    % Monocytes 5 %    % Eosinophils 1 %    % Basophils 1 %    % Immature Granulocytes 0 %    NRBCs per 100 WBC 0 <1 /100    Absolute Neutrophils 5.6 1.6 - 8.3 10e3/uL    Absolute Lymphocytes 2.0 0.8 - 5.3 10e3/uL    Absolute Monocytes 0.4 0.0 - 1.3 10e3/uL    Absolute Eosinophils 0.0 0.0 - 0.7 10e3/uL    Absolute Basophils 0.1 0.0 - 0.2 10e3/uL    Absolute Immature Granulocytes 0.0 <=0.4 10e3/uL    Absolute NRBCs 0.0 10e3/uL   Valproic acid     Status: Abnormal   Result Value Ref Range    Valproic acid 41.9 (L)   ug/mL   UA with Microscopic reflex to Culture     Status: Abnormal    Specimen: Urine, Midstream   Result Value Ref Range    Color Urine Yellow Colorless, Straw, Light Yellow, Yellow    Appearance Urine Clear Clear    Glucose Urine Negative Negative mg/dL    Bilirubin Urine Negative Negative    Ketones Urine 10 (A) Negative mg/dL    Specific Gravity Urine 1.029 1.003 - 1.035    Blood Urine Negative Negative    pH Urine 6.0 5.0 - 7.0    Protein Albumin Urine Negative Negative mg/dL    Urobilinogen Urine Normal Normal, 2.0 mg/dL    Nitrite Urine Negative Negative    Leukocyte Esterase Urine Negative Negative    Mucus Urine Present (A) None Seen  /LPF    RBC Urine 0 <=2 /HPF    WBC Urine 1 <=5 /HPF    Squamous Epithelials Urine <1 <=1 /HPF    Narrative    Urine Culture not indicated   Glucose by meter     Status: Abnormal   Result Value Ref Range    GLUCOSE BY METER POCT 107 (H) 70 - 99 mg/dL   Glucose by meter     Status: Normal   Result Value Ref Range    GLUCOSE BY METER POCT 80 70 - 99 mg/dL   Comprehensive metabolic panel     Status: Abnormal   Result Value Ref Range    Sodium 138 135 - 145 mmol/L    Potassium 4.5 3.4 - 5.3 mmol/L    Carbon Dioxide (CO2) 21 (L) 22 - 29 mmol/L    Anion Gap 13 7 - 15 mmol/L    Urea Nitrogen 15.1 6.0 - 20.0 mg/dL    Creatinine 0.70 0.51 - 0.95 mg/dL    GFR Estimate >90 >60 mL/min/1.73m2    Calcium 9.1 8.8 - 10.4 mg/dL    Chloride 104 98 - 107 mmol/L    Glucose 90 70 - 99 mg/dL    Alkaline Phosphatase 59 40 - 150 U/L    AST 11 0 - 45 U/L    ALT 10 0 - 50 U/L    Protein Total 7.3 6.4 - 8.3 g/dL    Albumin 4.3 3.5 - 5.2 g/dL    Bilirubin Total 0.2 <=1.2 mg/dL   TSH with free T4 reflex     Status: Normal   Result Value Ref Range    TSH 3.02 0.30 - 4.20 uIU/mL   Vitamin D Deficiency     Status: Normal   Result Value Ref Range    Vitamin D, Total (25-Hydroxy) 26 20 - 50 ng/mL    Narrative    Season, race, dietary intake, and treatment affect the concentration of 25-hydroxy-Vitamin D. Values may decrease during winter months and increase during summer months.    Vitamin D determination is routinely performed by an immunoassay specific for 25 hydroxyvitamin D3.  If an individual is on vitamin D2(ergocalciferol) supplementation, please specify 25 OH vitamin D2 and D3 level determination by LCMSMS test VITD23.     Vitamin B12     Status: Normal   Result Value Ref Range    Vitamin B12 325 232 - 1,245 pg/mL   Folate     Status: Normal   Result Value Ref Range    Folic Acid 8.3 4.6 - 34.8 ng/mL   CBC with platelets and differential     Status: None   Result Value Ref Range    WBC Count 8.4 4.0 - 11.0 10e3/uL    RBC Count 4.51 3.80 -  5.20 10e6/uL    Hemoglobin 12.4 11.7 - 15.7 g/dL    Hematocrit 36.7 35.0 - 47.0 %    MCV 81 78 - 100 fL    MCH 27.5 26.5 - 33.0 pg    MCHC 33.8 31.5 - 36.5 g/dL    RDW 13.5 10.0 - 15.0 %    Platelet Count 213 150 - 450 10e3/uL    % Neutrophils 60 %    % Lymphocytes 32 %    % Monocytes 5 %    % Eosinophils 3 %    % Basophils 1 %    % Immature Granulocytes 0 %    NRBCs per 100 WBC 0 <1 /100    Absolute Neutrophils 5.0 1.6 - 8.3 10e3/uL    Absolute Lymphocytes 2.7 0.8 - 5.3 10e3/uL    Absolute Monocytes 0.4 0.0 - 1.3 10e3/uL    Absolute Eosinophils 0.2 0.0 - 0.7 10e3/uL    Absolute Basophils 0.1 0.0 - 0.2 10e3/uL    Absolute Immature Granulocytes 0.0 <=0.4 10e3/uL    Absolute NRBCs 0.0 10e3/uL   CBC with platelets differential     Status: None    Narrative    The following orders were created for panel order CBC with platelets differential.  Procedure                               Abnormality         Status                     ---------                               -----------         ------                     CBC with platelets and d...[[id number]]                      Final result                 Please view results for these tests on the individual orders.   CBC with Platelets & Differential     Status: None    Narrative    The following orders were created for panel order CBC with Platelets & Differential.  Procedure                               Abnormality         Status                     ---------                               -----------         ------                     CBC with platelets and d...[[id number]]                      Final result                 Please view results for these tests on the individual orders.             Consults:   Consultation during this admission received from nutrition and pharmacy  Oliva Sorensen RD   Registered Dietitian  Nutrition     Progress Notes  Signed     Date of Service: 11/21/2024 12:46 PM  Creation Time: 11/21/2024 10:48 AM       CLINICAL NUTRITION SERVICES -  REASSESSMENT NOTE      Nutrition Prescription     RECOMMENDATIONS FOR MDs/PROVIDERS TO ORDER:  None at this time     Malnutrition Status:    Patient does not meet two of the established criteria necessary for diagnosing malnutrition     Recommendations already ordered by Registered Dietitian (RD):  Continue current orders     Future/Additional Recommendations:  RD to sign off at this time, but will remain available by consult if new nutrition problem arises.        EVALUATION OF THE PROGRESS TOWARD GOALS   Diet: Regular  Supplement: Ginger ale w/ meals  Intake: eating 100% of meals recorded per flowsheets and RN notes     NEW FINDINGS   Per chart review, pt is eating 100% of meals and snacks with excellent hydration. No further concerns at this time.      Labs:  Reviewed      Medications:  Depakote ER, colace, prozac, MVI-M, zyprexa, protonix, vit D3, atarax prn, zofran prn, miralax prn, senokot-s prn     GI: no problems reported     Weights:      Wt Readings from Last 15 Encounters:   11/19/24 109.7 kg (241 lb 12.8 oz)   11/12/24 106.6 kg (235 lb)      11/08/24 106.9 kg (235 lb 11.2 oz)      10/28/24 111.6 kg (246 lb)   10/25/24 115.7 kg (255 lb)   10/19/24 115.7 kg (255 lb)   10/17/24 115.7 kg (255 lb)   10/17/24 115.7 kg (255 lb)   10/17/24 111.8 kg (246 lb 6.4 oz)   08/08/24 112.8 kg (248 lb 11.2 oz)   08/05/24 111.1 kg (245 lb)   07/24/24 112.5 kg (248 lb)   07/15/24 111.1 kg (245 lb)   06/26/24 113.4 kg (250 lb)   06/25/24 113.4 kg (250 lb)   06/17/24 116.8 kg (257 lb 8 oz)   06/13/24 112.9 kg (249 lb)   No weight loss noted since admission     MALNUTRITION  % Intake: No decreased intake noted  % Weight Loss: None noted  Subcutaneous Fat Loss: None observed  Muscle Loss: None observed  Fluid Accumulation/Edema: None noted  Malnutrition Diagnosis: Patient does not meet two of the established criteria necessary for diagnosing malnutrition     Previous Goals   Patient to consume % of nutritionally adequate  meal trays TID, or the equivalent with supplements/snacks.  Evaluation: Met     Previous Nutrition Diagnosis  Predicted inadequate nutrient intake (kcal/protein) related to poor appetite/recent weight loss  Evaluation: Resolved     CURRENT NUTRITION DIAGNOSIS  No nutrition diagnosis at this time      INTERVENTIONS  Implementation  Modify composition of meals/snacks     Goals  Patient to consume % of nutritionally adequate meal trays TID, or the equivalent with supplements/snacks.     Monitoring/Evaluation  RD to sign off at this time, but will remain available by consult if new nutrition problem arises.       Oliva Sorensen MPH, RDN, LD  Behavioral Health Adult & Pediatric Dietitian  BEH Clinical Dietitian Vocera, Teams, or Desk: [phone]  Weekend/Holiday Voctiffanie: Weekend Holiday Clinical Dietitian [Multi Site Groups]          Olamide Begum  Pharmacy     Consults      Signed     Date of Service: 11/19/2024  3:30 PM  Creation Time: 11/19/2024  3:30 PM  Consult Orders   Pharmacy Liaison for Medication Coverage: Aripiprazole lauroxil ER [[id number]] ordered by Adrienne Tejada APRN CNP at 11/19/24 1143          Summary: Test Claim: Aristada     Consulted to run a test claim for Aristada.     Patient has pharmacy benefits through [employer] Part D & [employer] [facility]. Per insurance, the following are covered and preferred under the patient's plans:     Aristada 882mg/3.2ml - $0 (pt was receiving in-clinic PTA)        If patient is receiving this injection in-clinic, medication will need to be billed under medical benefits. Discharge pharmacy liaison is unable to verify coverage under the medical benefit. To verify coverage under medical benefits, patient, SW, RNCC, CM, or other member of care team may:     Contact Member Services department of patient's insurance, OR   Complete a cost of care estimate request by calling [phone] or via  [email or web address]  Only valid to check benefits if receiving injection at an [facility] Clinic or Infusion Site  Turnaround time is about 1 to 3 business days for this estimate     When an outpatient order for this injection is placed in chart along with an appointment for administration at an [facility] clinic/infusion center, coverage will be secured by the CAM and Infusion Finance teams.        Please feel free to contact me with any other test claims, prior authorizations, or insurance questions regarding outpatient medications.     Thanks!        Olamide Begum, [facility]  Discharge Pharmacy Liaison  [facility]  Pronouns: She/Her/Hers                  Hospital Course:   Sadaf Ross was admitted to Station 6AE with attending Sandie Arriaga MD as a voluntary patient. The patient was placed under status 15 (15 minute checks) to ensure patient safety.     Medication management:   --Abilify Aristada intramuscular injection 882 mg every 28 days.  Next dose due on 11/29  --Clonidine 0.1 mg at bedtime  -- Depakote 500 mg HS VPA 41.9  -- Decrease Prozac to 60 mg, it might be increasing the impulsivity  -- Zyprexa, 7.5 mg at bedtime (increased)  -- Trazodone, 100 mg at bedtime  -- Hydroxyzine and Zyprexa are available as needed  Provider reviewed medications with patient and with mother.     Provider had concerns that patient was getting secondary gain from inpatient hospitalization. She referred to staff and her peers as her best friends. She resisted going to groups. Patient would swear at the staff. Patient was on SIO during her stay due to patient saying she would harm herself with out SIO. Patient engaged in mild scratching on her arms.     Patient had a treatment/behavior plan   Bed removed from room.  Mattress on floor. She was not being safe with bed. Concern she was intentionally falling out of  "bed.   Room is stripped due to her unsafe behaviors.   Patient can have safety blanket. No linens due to unsafe behaviors.   Patient is encouraged to meet with unit therapist to develop coping skills.   Staff conversation should be treatment centered. Concern that patient is interpreting staff as \"friends\". Patient is avoiding treatment and should be directed to treatment activities.    Sadaf Ross did participate in groups and was visible in the milieu.     The patient's symptoms of suicidal ideation and SIB urges  improved. Patient reports improved mood and denies suicidal ideation. Patient reports reduced urges for SIB. She has not engaged in SIB behaviors for one week.     Sadaf Ross was released to home. At the time of discharge Sadaf Ross was determined to not be a danger to herself or others.          Discharge Medications:     Current Discharge Medication List        START taking these medications    Details   !! OLANZapine (ZYPREXA) 5 MG tablet Take 1 tablet (5 mg) by mouth  daily as needed (agitation).  Qty: 10 tablet, Refills: 0    Associated Diagnoses: Borderline personality disorder (H); Bipolar affective disorder, currently depressed, moderate (H)      !! OLANZapine (ZYPREXA) 7.5 MG tablet Take 1 tablet (7.5 mg) by mouth at bedtime.  Qty: 30 tablet, Refills: 0    Associated Diagnoses: Borderline personality disorder (H); Bipolar affective disorder, currently depressed, moderate (H)       !! - Potential duplicate medications found. Please discuss with provider.        CONTINUE these medications which have CHANGED    Details   cloNIDine (CATAPRES) 0.1 MG tablet Take 1 tablet (0.1 mg) by mouth at bedtime.  Qty: 30 tablet, Refills: 0    Associated Diagnoses: Borderline personality disorder (H)      divalproex sodium extended-release (DEPAKOTE ER) 500 MG 24 hr tablet Take 1 tablet (500 mg) by mouth at bedtime.  Qty: 30 tablet, Refills: 0    Associated Diagnoses: Borderline personality " disorder (H); Bipolar affective disorder, currently depressed, moderate (H)      FLUoxetine (PROZAC) 40 MG capsule Take 1 capsule (40 mg) by mouth daily.  Qty: 30 capsule, Refills: 0    Associated Diagnoses: Bipolar affective disorder, currently depressed, moderate (H)      hydrOXYzine HCl (ATARAX) 25 MG tablet Take 1-2 tablets (25-50 mg) by mouth every 4 hours as needed for anxiety.  Qty: 30 tablet, Refills: 0    Associated Diagnoses: Anxiety      ondansetron (ZOFRAN) 8 MG tablet Take 1 tablet (8 mg) by mouth every 8 hours as needed for nausea.  Qty: 30 tablet, Refills: 0    Associated Diagnoses: Nausea      pantoprazole (PROTONIX) 40 MG EC tablet Take 1 tablet (40 mg) by mouth daily.  Qty: 30 tablet, Refills: 0    Associated Diagnoses: Gastroesophageal reflux disease with esophagitis, unspecified whether hemorrhage           CONTINUE these medications which have NOT CHANGED    Details   acetaminophen (TYLENOL) 325 MG tablet Take 650 mg by mouth every 6 hours as needed for mild pain      albuterol (PROAIR HFA/PROVENTIL HFA/VENTOLIN HFA) 108 (90 Base) MCG/ACT inhaler Inhale 1 puff into the lungs every 6 hours as needed for shortness of breath.      ARIPiprazole lauroxil ER (ARISTADA) 882 MG/3.2ML intra-muscular Inject 882 mg into the muscle every 28 days On the 22nd of each month      benztropine (COGENTIN) 1 MG tablet Take 1 mg by mouth 2 times daily.      cetirizine (ZYRTEC) 10 MG tablet Take 10 mg by mouth daily.      clotrimazole (LOTRIMIN) 1 % external cream Apply topically 2 times daily.      dicyclomine (BENTYL) 20 MG tablet Take 20 mg by mouth 2 times daily.      docusate sodium (COLACE) 50 MG capsule Take 100 mg by mouth 2 times daily      lactase (LACTAID) 3000 UNIT tablet Take 1 tablet (3,000 Units) by mouth 3 times daily as needed for indigestion  Qty: 30 tablet, Refills: 1      multivitamin w/minerals (MULTI-VITAMIN) tablet Take 1 tablet by mouth daily.      polyethylene glycol (MIRALAX) 17 GM/Dose  powder Take 1 Capful by mouth daily as needed for constipation.      promethazine (PHENERGAN) 12.5 MG tablet Take 1 tablet (12.5 mg) by mouth every 6 hours as needed for nausea.    Associated Diagnoses: Nausea      senna-docusate (SENOKOT-S/PERICOLACE) 8.6-50 MG tablet Take 1 tablet by mouth 2 times daily as needed.      sodium chloride 0.65 % nasal spray Spray 1 spray in nostril daily as needed.      traZODone (DESYREL) 100 MG tablet Take 100 mg by mouth at bedtime.      Vitamin D, Cholecalciferol, 25 MCG (1000 UT) TABS Take 1,000 Units by mouth daily       levothyroxine (SYNTHROID/LEVOTHROID) 25 MCG tablet Take 1 tablet (25 mcg) by mouth daily for 30 days  Qty: 30 tablet, Refills: 0           STOP taking these medications       famotidine (PEPCID) 20 MG tablet Comments:   Reason for Stopping:         OLANZapine zydis (ZYPREXA) 5 MG ODT Comments:   Reason for Stopping:                    Psychiatric Examination:   Appearance:  awake, alert and adequately groomed  Attitude:  cooperative  Eye Contact:  good  Mood:  better  Affect:  intensity is blunted  Speech:  normal prosody  Psychomotor Behavior:  no evidence of tardive dyskinesia, dystonia, or tics  Thought Process:  goal oriented  Associations:  no loose associations  Thought Content:  no evidence of suicidal ideation or homicidal ideation, no auditory hallucinations present, and no visual hallucinations present  Insight:  limited  Judgment:  limited  Oriented to:  time, person, and place  Attention Span and Concentration:  fair  Recent and Remote Memory:  intact  Language: Able to name objects, Able to repeat phrases, and Able to read and write  Fund of Knowledge: delayed  Muscle Strength and Tone: normal  Gait and Station: Normal         Discharge Plan:       Further instructions from your care team         Behavioral Discharge Planning and Instructions    Summary: You were admitted on 11/4/2024  due to Suicidal Ideations and Self Injurious Behaviors.  You  were treated by Debra Naegele, APRN,CNP and discharged on 11/22/2024 from Young Adult to Home    Main Diagnosis:   Suicidal ideation  Self injurious behavior  Borderline personality disorder  Intellectual disability      Health Care Follow-up:   [facility] Intake appointment: Thursday, December 12, 2024 @ 11:30am via Phone  Provider: [facility]  Address: [facility], [facility] [place] [address], [place] [address]  Phone: [phone] Fax: [phone]  Notes: At the time of your appointment, you will receive a phone call @ [phone]. All subsequent appointments will be in-person at your residence. If you prefer a Telehealth/video visit for this appointment, please call the clinic with an updated email address.     Appointment Date/Time: Thursday, December 19th @ 8am  Psychiatrist/Primary Care Giver: LINN Tong CNP   Address: [facility]   Phone Number: [phone]     Appointment Date/Time: Wednesday December 18th @ 9am    Therapist: Yonathan Pereira    Address: [facility]   Phone Number: [phone]         [facility]:  [facility] were [facility] [place] ([phone]) received your referral and will follow up with you to schedule a tour.       [facility] offer a supportive community environment to meet people and get peer support, participate in recreational activities, and engage in volunteer work. They are located in [facility], and [place]. To learn more, go to [email or web address] or call or email their  at [phone] and [email or web address] to schedule a tour.           Information will be faxed to your outpatient providers to ensure a healthy continuity of care for you.     Attend all scheduled appointments with your outpatient providers. Call at least 24 hours in advance if you need to reschedule an appointment to ensure continued access to your outpatient providers.      Major Treatments, Procedures and Findings:  You were provided with: a psychiatric assessment, assessed for medical stability, medication evaluation and/or management, group therapy, individual therapy, and milieu management    Symptoms to Report: increased confusion, losing more sleep, mood getting worse, or thoughts of suicide    Early warning signs can include: increased depression or anxiety sleep disturbances increased thoughts or behaviors of suicide or self-harm     Safety and Wellness:  Take all medicines as directed.  Make no changes unless your doctor suggests them.      Follow treatment recommendations.  Refrain from alcohol and non-prescribed drugs.  If there is a concern for safety, call 911.    Resources:   Mental Health Crisis Resources  Throughout [place]: call **CRISIS (**186332)  Crisis Text Line: is available for free, 24/7 by texting [place] to 403540  Suicide Awareness Voices of Education (SAVE) (www.save.org): 253-037-EONP (6760)  The [place] Suicide Prevention Lifeline is now: 988 Suicide and Crisis Lifeline. Call 988 anytime.  National Pedro on Mental Illness (www.[place].celine.org): 626.422.9087 or 963-867-1773.  Tafa1mgdd: text the word LIFE to 27286 for immediate support and crisis intervention  Mental Health Consumer/Survivor Network of [place] (www.mhcsn.net): 575.147.4690 or 082-224-3441  Mental Health Association of [place] (www.mentalhealth.org): 311.942.3905 or 920-757-2122  Peer Support Connection [place] [facility] ([facility]) 1-670.938.1967 Available from 5pm - 9am (7 days a week/365 days a year)  Call or Text 988 or [facility] 1-429.910.2019 [facility] Mental Crisis Program      General Medication Instructions:   See your medication sheet(s) for instructions.   Take all medicines as directed.  Make no changes unless your doctor suggests them.   Go to all your doctor visits.  Be sure to have all your required lab tests. This way, your medicines can be refilled on time.  Do not use any drugs not  prescribed by your doctor.  Avoid alcohol.    Advance Directives:   Scanned document on file with [facility]? No scanned doc  Is document scanned? Pt states no documents  Honoring Choices Your Rights Handout: Informed and given  Was more information offered? Pt declined    The Treatment team has appreciated the opportunity to work with you. If you have any questions or concerns about your recent admission, you can contact the unit which can receive your call 24 hours a day, 7 days a week. They will be able to get in touch with a Provider if needed. The unit number is [phone] .               Attestation:  The patient has been seen and evaluated by me,  Debra A. Naegele, APRN CNS on 11/22/2024  Discharge summary time > 30 minutes

## 2024-11-22 NOTE — PLAN OF CARE
Problem: Sleep Disturbance  Goal: Adequate Sleep/Rest  Outcome: Progressing   Goal Outcome Evaluation:  Focus: Shift summary    Data: Patient slept 6 hours last night. No interventions needed. Remains on 1:1 SIO for SIB/SI. Respirations even and unlabored on status 15 checks. Will continue to monitor and report to oncoming staff.    Response: Report sleep hours to day shift. Continue to monitor patient and provide therapeutic interventions as necessary.

## 2024-11-25 ENCOUNTER — PATIENT OUTREACH (OUTPATIENT)
Dept: CARE COORDINATION | Facility: CLINIC | Age: 24
End: 2024-11-25
Payer: MEDICARE

## 2024-11-25 ENCOUNTER — HOSPITAL ENCOUNTER (OUTPATIENT)
Facility: CLINIC | Age: 24
Setting detail: OBSERVATION
Discharge: HOME | End: 2024-11-26
Attending: FAMILY MEDICINE | Admitting: FAMILY MEDICINE
Payer: MEDICARE

## 2024-11-25 DIAGNOSIS — F43.10 PTSD (POST-TRAUMATIC STRESS DISORDER): ICD-10-CM

## 2024-11-25 DIAGNOSIS — F79 INTELLECTUAL DISABILITY: ICD-10-CM

## 2024-11-25 DIAGNOSIS — R45.851 SUICIDAL THOUGHTS: ICD-10-CM

## 2024-11-25 DIAGNOSIS — F60.3 BORDERLINE PERSONALITY DISORDER (H): ICD-10-CM

## 2024-11-25 LAB
ALBUMIN SERPL BCG-MCNC: 3.8 G/DL (ref 3.5–5.2)
ALP SERPL-CCNC: 53 U/L (ref 40–150)
ALT SERPL W P-5'-P-CCNC: 10 U/L (ref 0–50)
ANION GAP SERPL CALCULATED.3IONS-SCNC: 12 MMOL/L (ref 7–15)
AST SERPL W P-5'-P-CCNC: 9 U/L (ref 0–45)
BASOPHILS # BLD AUTO: 0 10E3/UL (ref 0–0.2)
BASOPHILS NFR BLD AUTO: 0 %
BILIRUB SERPL-MCNC: <0.2 MG/DL
BUN SERPL-MCNC: 14.1 MG/DL (ref 6–20)
CALCIUM SERPL-MCNC: 8.6 MG/DL (ref 8.8–10.4)
CHLORIDE SERPL-SCNC: 103 MMOL/L (ref 98–107)
CREAT SERPL-MCNC: 0.96 MG/DL (ref 0.51–0.95)
EGFRCR SERPLBLD CKD-EPI 2021: 84 ML/MIN/1.73M2
EOSINOPHIL # BLD AUTO: 0.3 10E3/UL (ref 0–0.7)
EOSINOPHIL NFR BLD AUTO: 3 %
ERYTHROCYTE [DISTWIDTH] IN BLOOD BY AUTOMATED COUNT: 13.7 % (ref 10–15)
GLUCOSE SERPL-MCNC: 107 MG/DL (ref 70–99)
HCO3 SERPL-SCNC: 21 MMOL/L (ref 22–29)
HCT VFR BLD AUTO: 33.6 % (ref 35–47)
HGB BLD-MCNC: 11.4 G/DL (ref 11.7–15.7)
IMM GRANULOCYTES # BLD: 0 10E3/UL
IMM GRANULOCYTES NFR BLD: 0 %
LYMPHOCYTES # BLD AUTO: 3.5 10E3/UL (ref 0.8–5.3)
LYMPHOCYTES NFR BLD AUTO: 36 %
MCH RBC QN AUTO: 27.5 PG (ref 26.5–33)
MCHC RBC AUTO-ENTMCNC: 33.9 G/DL (ref 31.5–36.5)
MCV RBC AUTO: 81 FL (ref 78–100)
MONOCYTES # BLD AUTO: 0.6 10E3/UL (ref 0–1.3)
MONOCYTES NFR BLD AUTO: 6 %
NEUTROPHILS # BLD AUTO: 5.2 10E3/UL (ref 1.6–8.3)
NEUTROPHILS NFR BLD AUTO: 54 %
NRBC # BLD AUTO: 0 10E3/UL
NRBC BLD AUTO-RTO: 0 /100
PLATELET # BLD AUTO: 248 10E3/UL (ref 150–450)
POTASSIUM SERPL-SCNC: 3.9 MMOL/L (ref 3.4–5.3)
PROT SERPL-MCNC: 6.5 G/DL (ref 6.4–8.3)
RBC # BLD AUTO: 4.14 10E6/UL (ref 3.8–5.2)
SODIUM SERPL-SCNC: 136 MMOL/L (ref 135–145)
VALPROATE SERPL-MCNC: 14.9 UG/ML
WBC # BLD AUTO: 9.6 10E3/UL (ref 4–11)

## 2024-11-25 PROCEDURE — 250N000013 HC RX MED GY IP 250 OP 250 PS 637: Performed by: EMERGENCY MEDICINE

## 2024-11-25 PROCEDURE — 80053 COMPREHEN METABOLIC PANEL: CPT | Performed by: FAMILY MEDICINE

## 2024-11-25 PROCEDURE — 99223 1ST HOSP IP/OBS HIGH 75: CPT | Performed by: FAMILY MEDICINE

## 2024-11-25 PROCEDURE — G0378 HOSPITAL OBSERVATION PER HR: HCPCS

## 2024-11-25 PROCEDURE — 99285 EMERGENCY DEPT VISIT HI MDM: CPT | Mod: 25 | Performed by: FAMILY MEDICINE

## 2024-11-25 PROCEDURE — 80164 ASSAY DIPROPYLACETIC ACD TOT: CPT | Performed by: FAMILY MEDICINE

## 2024-11-25 PROCEDURE — 36415 COLL VENOUS BLD VENIPUNCTURE: CPT | Performed by: FAMILY MEDICINE

## 2024-11-25 PROCEDURE — 99222 1ST HOSP IP/OBS MODERATE 55: CPT | Performed by: CLINICAL NURSE SPECIALIST

## 2024-11-25 PROCEDURE — 85004 AUTOMATED DIFF WBC COUNT: CPT | Performed by: FAMILY MEDICINE

## 2024-11-25 PROCEDURE — 250N000013 HC RX MED GY IP 250 OP 250 PS 637: Performed by: FAMILY MEDICINE

## 2024-11-25 PROCEDURE — 82040 ASSAY OF SERUM ALBUMIN: CPT | Performed by: FAMILY MEDICINE

## 2024-11-25 RX ORDER — BENZTROPINE MESYLATE 1 MG/1
1 TABLET ORAL 2 TIMES DAILY
Status: DISCONTINUED | OUTPATIENT
Start: 2024-11-25 | End: 2024-11-26 | Stop reason: HOSPADM

## 2024-11-25 RX ORDER — DIVALPROEX SODIUM 500 MG/1
500 TABLET, FILM COATED, EXTENDED RELEASE ORAL AT BEDTIME
Status: DISCONTINUED | OUTPATIENT
Start: 2024-11-25 | End: 2024-11-26 | Stop reason: HOSPADM

## 2024-11-25 RX ORDER — PROMETHAZINE HYDROCHLORIDE 12.5 MG/1
12.5 TABLET ORAL EVERY 6 HOURS PRN
Status: DISCONTINUED | OUTPATIENT
Start: 2024-11-25 | End: 2024-11-26 | Stop reason: HOSPADM

## 2024-11-25 RX ORDER — AMOXICILLIN 250 MG
1 CAPSULE ORAL 2 TIMES DAILY PRN
Status: DISCONTINUED | OUTPATIENT
Start: 2024-11-25 | End: 2024-11-26 | Stop reason: HOSPADM

## 2024-11-25 RX ORDER — TRAZODONE HYDROCHLORIDE 50 MG/1
100 TABLET, FILM COATED ORAL AT BEDTIME
Status: DISCONTINUED | OUTPATIENT
Start: 2024-11-25 | End: 2024-11-26 | Stop reason: HOSPADM

## 2024-11-25 RX ORDER — ALBUTEROL SULFATE 90 UG/1
1 INHALANT RESPIRATORY (INHALATION) EVERY 6 HOURS PRN
Status: DISCONTINUED | OUTPATIENT
Start: 2024-11-25 | End: 2024-11-26 | Stop reason: HOSPADM

## 2024-11-25 RX ORDER — LEVOTHYROXINE SODIUM 25 UG/1
25 TABLET ORAL DAILY
Status: DISCONTINUED | OUTPATIENT
Start: 2024-11-25 | End: 2024-11-26 | Stop reason: HOSPADM

## 2024-11-25 RX ORDER — CHOLECALCIFEROL (VITAMIN D3) 125 MCG
3000 CAPSULE ORAL 3 TIMES DAILY PRN
Status: DISCONTINUED | OUTPATIENT
Start: 2024-11-25 | End: 2024-11-26 | Stop reason: HOSPADM

## 2024-11-25 RX ORDER — OLANZAPINE 10 MG/1
10 TABLET, ORALLY DISINTEGRATING ORAL ONCE
Status: COMPLETED | OUTPATIENT
Start: 2024-11-25 | End: 2024-11-25

## 2024-11-25 RX ORDER — HYDROXYZINE HYDROCHLORIDE 25 MG/1
25-50 TABLET, FILM COATED ORAL EVERY 4 HOURS PRN
Status: DISCONTINUED | OUTPATIENT
Start: 2024-11-25 | End: 2024-11-26 | Stop reason: HOSPADM

## 2024-11-25 RX ORDER — POLYETHYLENE GLYCOL 3350 17 G/17G
17 POWDER, FOR SOLUTION ORAL DAILY PRN
Status: DISCONTINUED | OUTPATIENT
Start: 2024-11-25 | End: 2024-11-26 | Stop reason: HOSPADM

## 2024-11-25 RX ORDER — LEVOTHYROXINE SODIUM 25 UG/1
25 TABLET ORAL DAILY
COMMUNITY

## 2024-11-25 RX ORDER — OLANZAPINE 5 MG/1
5 TABLET ORAL 3 TIMES DAILY PRN
Status: DISCONTINUED | OUTPATIENT
Start: 2024-11-25 | End: 2024-11-26 | Stop reason: HOSPADM

## 2024-11-25 RX ORDER — PANTOPRAZOLE SODIUM 40 MG/1
40 TABLET, DELAYED RELEASE ORAL DAILY
Status: DISCONTINUED | OUTPATIENT
Start: 2024-11-25 | End: 2024-11-26 | Stop reason: HOSPADM

## 2024-11-25 RX ORDER — DICYCLOMINE HCL 20 MG
20 TABLET ORAL 2 TIMES DAILY
Status: DISCONTINUED | OUTPATIENT
Start: 2024-11-25 | End: 2024-11-26 | Stop reason: HOSPADM

## 2024-11-25 RX ORDER — ACETAMINOPHEN 500 MG
1000 TABLET ORAL ONCE
Status: COMPLETED | OUTPATIENT
Start: 2024-11-25 | End: 2024-11-25

## 2024-11-25 RX ORDER — ONDANSETRON 8 MG/1
8 TABLET, FILM COATED ORAL EVERY 8 HOURS PRN
Status: DISCONTINUED | OUTPATIENT
Start: 2024-11-25 | End: 2024-11-26 | Stop reason: HOSPADM

## 2024-11-25 RX ADMIN — HYDROXYZINE HYDROCHLORIDE 50 MG: 25 TABLET, FILM COATED ORAL at 17:44

## 2024-11-25 RX ADMIN — BENZTROPINE MESYLATE 1 MG: 1 TABLET ORAL at 10:10

## 2024-11-25 RX ADMIN — ACETAMINOPHEN 1000 MG: 500 TABLET ORAL at 19:19

## 2024-11-25 RX ADMIN — DICYCLOMINE HYDROCHLORIDE 20 MG: 20 TABLET ORAL at 22:16

## 2024-11-25 RX ADMIN — FLUOXETINE HYDROCHLORIDE 40 MG: 20 CAPSULE ORAL at 10:10

## 2024-11-25 RX ADMIN — OLANZAPINE 10 MG: 10 TABLET, ORALLY DISINTEGRATING ORAL at 11:37

## 2024-11-25 RX ADMIN — PANTOPRAZOLE SODIUM 40 MG: 40 TABLET, DELAYED RELEASE ORAL at 10:10

## 2024-11-25 RX ADMIN — DICYCLOMINE HYDROCHLORIDE 20 MG: 20 TABLET ORAL at 10:10

## 2024-11-25 RX ADMIN — TRAZODONE HYDROCHLORIDE 100 MG: 50 TABLET ORAL at 22:17

## 2024-11-25 RX ADMIN — BENZTROPINE MESYLATE 1 MG: 1 TABLET ORAL at 22:17

## 2024-11-25 RX ADMIN — LEVOTHYROXINE SODIUM 25 MCG: 25 TABLET ORAL at 10:10

## 2024-11-25 RX ADMIN — DIVALPROEX SODIUM 500 MG: 500 TABLET, FILM COATED, EXTENDED RELEASE ORAL at 22:17

## 2024-11-25 ASSESSMENT — ACTIVITIES OF DAILY LIVING (ADL)
ADLS_ACUITY_SCORE: 55
ADLS_ACUITY_SCORE: 0
ADLS_ACUITY_SCORE: 0
ADLS_ACUITY_SCORE: 55
ADLS_ACUITY_SCORE: 55

## 2024-11-25 ASSESSMENT — COLUMBIA-SUICIDE SEVERITY RATING SCALE - C-SSRS
5. HAVE YOU STARTED TO WORK OUT OR WORKED OUT THE DETAILS OF HOW TO KILL YOURSELF? DO YOU INTEND TO CARRY OUT THIS PLAN?: YES
3. HAVE YOU BEEN THINKING ABOUT HOW YOU MIGHT KILL YOURSELF?: YES
6. HAVE YOU EVER DONE ANYTHING, STARTED TO DO ANYTHING, OR PREPARED TO DO ANYTHING TO END YOUR LIFE?: NO
2. HAVE YOU ACTUALLY HAD ANY THOUGHTS OF KILLING YOURSELF IN THE PAST MONTH?: YES
1. IN THE PAST MONTH, HAVE YOU WISHED YOU WERE DEAD OR WISHED YOU COULD GO TO SLEEP AND NOT WAKE UP?: YES
4. HAVE YOU HAD THESE THOUGHTS AND HAD SOME INTENTION OF ACTING ON THEM?: NO

## 2024-11-25 NOTE — CONSULTS
"Diagnostic Evaluation Consultation  Crisis Assessment    Patient Name: Sadaf Ross  Age:  24 year old  Legal Sex: female  Gender Identity: female  Pronouns:   Race: White  Ethnicity: Not  or   Language: English      Patient was assessed: In person   Crisis Assessment Start Date: 11/25/24  Crisis Assessment Start Time: 1100  Crisis Assessment Stop Time: 1115  Patient location: [facility] EMERGENCY DEPARTMENT                             [facility]-A    Referral Data and Chief Complaint    Sadaf Ross presents to the ED via EMS. Patient is presenting to the ED for the following concerns: Verbal agitation, Significant behavioral change, Anxiety, Suicidal ideation.   Factors that make the mental health crisis life threatening or complex are:  Prior mental health diagnoses including Borderline Personality Disorder, Bipolar Affective Disorder, ADHD, PTSD and intellectial disability. Sadaf Ross is a 24 year old female who presents to the ED via EMS due to increased anxiety and SI. Per chart review, patient has a history of bipolar disorder, BPD, ADHD, PTSD, chronic suicidal thoughts and frequent ED presentations with care plan in place. Per triage notes: \"Patient woke up this morning feeling that she was going to pass out, felt like crap, and felt she was going to have a seizure. Patient is having suicidal thoughts. Patient has not been taking her medication. Patient was feeling better when she left the hospital after recent admission 11/4/2024 - 11/22/2024.\"     Throughout this assessment, the patient is alert, oriented X4, anxious, irritable and guarded. When asked about the events that brought patient to the ED, patient reports \"I was vomitting all night, my nose was beeding all night. I have anxiety and suicidal thoughts. I need to be hospitalized. I'm violent. I'm having thoughts of killing people. That makes me happy.\" Patient then states \"I'm having thoughts about scratching " "myself to death. Like this.\" Patient then started scratching her right arm where there were existing scars. Patient continues \"I'm having intense anxiety like 10/10 anxiety. I need to be hospitalized.\" Writer reiterated that this assessment is to gather information about her mental health to help determine whether hospitalization is neccessary. Writer attempted to assess for suicidal and homicidal intention. Patient states \" Stop talking to me. I will kill you and everyone in this hospital.\" Patient then raised her voice and postured toward writer. Writer attempted to help patient to re-regulate. Patient briefly calmed down, exited the room, and ended the interview. On her way back, patient continues to yell profanity towards writer. At this time, RN approached patient, patient yelled that she wanted to die, would do it once she leaves, and that she wants to strangle people. She was able to be redirected to calm herself, but did start to pick at her wrist and bite her hand. Patient eventually did stop these behaviors when having a conversation about safety. Sitter present and providing a therapeutic environment. Due to limited ability to engage patient in the full assessment, the majority of this assessment was based on revied of past charts and collateral information from patient's mom.    Informed Consent and Assessment Methods  Explained the crisis assessment process, including applicable information disclosures and limits to confidentiality, assessed understanding of the process, and obtained consent to proceed with the assessment.  Assessment methods included conducting a formal interview with patient, review of medical records, collaboration with medical staff, and obtaining relevant collateral information from family and community providers when available.  : done     Patient response to interventions: acceptance expressed  Coping skills were attempted to reduce the crisis:  Unable to assess.     History of " the Crisis   Pt has a hx of frequent ED visits and chronic SI. Has an ED care plan. Patient's most recent IP mental health was in 11/4-11/22/2024. Patient discharegd home to parents this past Friday (11/22/24). Pt has a hx of intellectual disability, borderline personality disorder, bipolar affective disorder, ADHD and PTSD. Hx of NSSI via biting herself, headbanging or scratching herself. Pt was residing at a group home, but moved on in September and has been living with her mother since. Pt has long struggled with the loss of her father and has presented to the ED on multiple occasions when she is very sad and reports she is missing him. Pt has a hx of struggling to utilize her coping skills before coming to the ED or calling 911, but is able to engage in this today and identify her other family supports she still has.    Brief Psychosocial History  Family:  Single, Children no  Support System:  Parent(s), Sibling(s)  Employment Status:     Source of Income:  unable to assess  Financial Environmental Concerns:     Current Hobbies:     Barriers in Personal Life:       Significant Clinical History  Current Anxiety Symptoms:  racing thoughts, anxious  Current Depression/Trauma:  apathy, avoidance, sense of doom, impaired decision making, irritable, helplessness, thoughts of death/suicide  Current Somatic Symptoms:  anxious  Current Psychosis/Thought Disturbance:  impulsive, inattentive, hostile/aggressive, displaces blame, anger, agitation, high risk behavior  Current Eating Symptoms:     Chemical Use History:      Past diagnosis:  Bipolar Disorder, Personality Disorder, ADHD, PTSD  Family history:  No known history of mental health or chemical health concerns  Past treatment:  Psychiatric Medication Management, Individual therapy, Case management, Primary Care, Inpatient Hospitalization, Supportive Living Environment (group home, nursing home house, etc)  Details of most recent treatment:  Patient was discharged from IP  "mental health about 4 days ago (11/22). Patient was in IP from 11/4-11/22/24. Patient was living in a group home for some time. However, she returned to living with parents in September, 2024. Patient has medications management and case management in place.  Other relevant history:  Pt has a long history of extensive ED visits that have led to an ED Care Plan for her. However, the former plan included sending pt back to her group home where she had 24/7 supervision with trained staff. Pt is no longer living at her group home and is staying at her mothers house. She states a history of multiple suicide attempts, however due to inconsistent reporting it would be helpful to gain clarity on this history which will help in safety planning and risk mitigation.       Collateral Information  Is there collateral information: Yes     Collateral information name, relationship, phone number:  Yarely Ross (Mother)  [phone]    What happened today: She just got home on Friday. She has been doing fine the past few days. But when she woke up this morning, she got into a fight with her sister. Reportedly, patient just told the sister that she is not doing well and got up and left.     What is different about patient's functioning: Mom reports that \"Whenever she does not get her way, she starts to act out.\" When she got discharged on Friday, they told her that she needs someone to supervise her taking medications (due to history of overdosing). Because nobody is home to give her medications, patient missed a few doses of meds. No SI/HI/ NSIB. No concerns for substance use. Mom reports tht patient is welcome to return home if she wants to. Has nowhere else to go right now.  Mom reports concerns about patient \"going back to her old ways of calling 911 for the smallest things.\" Mom reports pt can't keep calling and going to the ED as [place] and  noticing a trend of too many EMS/police " visits to the home. Mom reports that meds and sharps are locked up at the house.     Has patient made comments about wanting to kill themselves/others: no    If d/c is recommended, can they take part in safety/aftercare planning:  yes    Additional collateral information:  They are working on finding her a new group home. looking for a new psychiatrist. : Lesley. PSS and PSA and talks to her on the phone.     Risk Assessment  [place] Suicide Severity Rating Scale Full Clinical Version:  Suicidal Ideation  Q6 Suicide Behavior (Lifetime): no    [place] Suicide Severity Rating Scale Recent:   Suicidal Ideation (Recent)  Q1 Wished to be Dead (Past Month): yes  Q2 Suicidal Thoughts (Past Month): yes  Q3 Suicidal Thought Method: yes  Q4 Suicidal Intent without Specific Plan: no  Q5 Suicide Intent with Specific Plan: no  Level of Risk per Screen: moderate risk     Suicidal Behavior (Recent)  Actual Attempt (Past 3 Months): No  Total Number of Actual Attempts (Past 3 Months): 0  Actual Attempt Description (Past 3 Months): N/A  Has subject engaged in non-suicidal self-injurious behavior? (Past 3 Months): Yes  Interrupted Attempts (Past 3 Months): No  Total Number of Interrupted Attempts (Past 3 Months): 0  Interrupted Attempt Description (Past 3 Months): N/A  Aborted or Self-Interrupted Attempt (Past 3 Months): No  Total Number of Aborted or Self-Interrupted Attempts (Past 3 Months): 0  Aborted or Self-Interrupted Attempt Description (Past 3 Months): N/A  Preparatory Acts or Behavior (Past 3 Months): No  Total Number of Preparatory Acts (Past 3 Months): 0  Preparatory Acts or Behavior Description (Past 3 Months): N/A    Environmental or Psychosocial Events: work or task failure, challenging interpersonal relationships, impulsivity/recklessness, other life stressors  Protective Factors: Protective Factors: help seeking, supportive ongoing medical and mental health care relationships, strong bond to family unit,  "community support, or employment, reality testing ability    Does the patient have thoughts of harming others? Feels Like Hurting Others: yes  Current presentation: Irritable  Violence Threats in Past 6 Months: Patient made verbal threat \"to kill you and everyone in this hospital\"  Is the patient engaging in sexually inappropriate behavior?: no  Duty to warn initiated: no    Is the patient engaging in sexually inappropriate behavior?  no        Mental Status Exam   Affect: Dramatic  Appearance: Appropriate  Attention Span/Concentration: Attentive  Eye Contact: Engaged    Fund of Knowledge: Appropriate   Language /Speech Content: Fluent  Language /Speech Volume: Normal  Language /Speech Rate/Productions: Normal  Recent Memory: Intact  Remote Memory: Variable  Mood: Irritable  Orientation to Person: Yes   Orientation to Place: Yes  Orientation to Time of Day: Yes  Orientation to Date: Yes     Situation (Do they understand why they are here?): Yes  Psychomotor Behavior: Normal  Thought Content: Homicidal, Suicidal  Thought Form: Goal Directed     Medication  Psychotropic medications:   Medication Orders - Psychiatric (From admission, onward)      Start     Dose/Rate Route Frequency Ordered Stop    11/26/24 2200  OLANZapine (zyPREXA) tablet 7.5 mg         7.5 mg Oral AT BEDTIME 11/25/24 1709      11/25/24 2200  traZODone (DESYREL) tablet 100 mg         100 mg Oral AT BEDTIME 11/25/24 1709      11/25/24 0945  FLUoxetine (PROzac) capsule 40 mg         40 mg Oral DAILY 11/25/24 0941      11/25/24 0941  OLANZapine (zyPREXA) tablet 5 mg         5 mg Oral 3 TIMES DAILY PRN 11/25/24 0941      11/25/24 0941  hydrOXYzine HCl (ATARAX) tablet 25-50 mg         25-50 mg Oral EVERY 4 HOURS PRN 11/25/24 0941               Current Care Team  Patient Care Team:  [facility] as PCP - General (Clinic)  Chloe Alva APRN CNP as Nurse Practitioner (OB/Gyn)  Seble Jones PA-C as Physician Assistant  Fidel Neely MD as " "Assigned Heart and Vascular Provider    Diagnosis  Patient Active Problem List   Diagnosis Code    MENTAL HEALTH     Suicidal ideation R45.851    Suicidal ideations R45.851    Intellectual disability F79    Bipolar affective disorder, currently depressed, moderate (H) F31.32    Borderline personality disorder (H) F60.3    Morbid obesity (H) E66.01    Unspecified mood (affective) disorder (H) F39    Chronic constipation K59.09    History of suicide attempt Z91.51    Hyperhidrosis R61    Major depressive disorder, recurrent, in full remission (H) F33.42    Vitamin D deficiency E55.9    Syncope R55    Seizure-like activity (H) R56.9    Syncope, unspecified syncope type R55    Bipolar affective disorder (H) F31.9    Has access to planned means of suicide R45.851    PTSD (post-traumatic stress disorder) F43.10    Suicidal thoughts R45.851       Primary Problem This Admission  Active Hospital Problems    PTSD (post-traumatic stress disorder)      Suicidal thoughts      *Bipolar affective disorder (H)      Borderline personality disorder (H)      Intellectual disability        Clinical Summary and Substantiation of Recommendations     Sadaf presents to the ED via EMS due to increased anxiety and SI. Per chart review, patient has a history of bipolar disorder, BPD, ADHD, PTSD, chronic suicidal thoughts and frequent ED presentations with care plan in place. When asked about the events that brought patient to the ED, patient reports \"I was vomitting all night, my nose was beeding all night. I have anxiety and suicidal thoughts. I need to be hospitalized. I'm violent. I'm having thoughts of killing people. Patient then states \"I'm having thoughts about scratching myself to death.\" Writer attempted to assess for suicidal and homicidal intention. Patient states \" Stop talking to me. I will kill you and everyone in this hospital.\" Patient allowed to rest in the ED for several hours. Writer approached patient to re-assess. " "Patient states \"Nothing have changed. I'm still having suicidal and homicidal thoughts.\" Patient declined to engage in safety planning to mitigate risks. Patient is very interested in getting hospitalized again but unable to elaborate on reasons. Patient recently discharged from  on 11/20/24. The patient appears to struggle with anxiety and difficulty managing emotions. The patient would likely benefit from skills building to help her establish additional coping skills to ground and regulate in situations of intense emotions.     After therapeutic assessment, intervention and aftercare planning by ED care team and LM and in consultation with attending provider, the patient's circumstances and mental state were appropriate for observation. Patient is not currently on the inpatient worklist. Extended Care will follow, working to address reassessment to reach final disposition.      Patient coping skills attempted to reduce the crisis:  Unable to assess.    Disposition  Recommended disposition: Observation        Reviewed case and recommendations with attending provider. Attending Name: Dr. Lowe.       Attending concurs with disposition: yes       Patient and/or validated legal guardian concurs with disposition:   yes       Final disposition:  observation    Legal status on admission: Voluntary/Patient has signed consent for treatment    Assessment Details   Total duration spent with the patient: 25 min     CPT code(s) utilized: Non-Billable    Melisa Callahan Psychotherapist  [facility] - Triage & Transition Services  Callback: [phone]          "

## 2024-11-25 NOTE — ED NOTES
Sadaf Ross  November 25, 2024  Plan of Care Hand-off Note     Patient Care Path: observation    Plan for Care:     Sadaf presents to the ED via EMS due to increased anxiety and SI. Patient recently discharged from  on 11/20/24. The patient appears to struggle with anxiety and difficulty managing emotions. The patient would likely benefit from skills building to help her establish additional coping skills to ground and regulate in situations of intense emotions.     After therapeutic assessment, intervention and aftercare planning by ED care team and LMHP and in consultation with attending provider, the patient's circumstances and mental state were appropriate for observation. Patient is not currently on the inpatient worklist. Extended Care will follow, working to address reassessment to reach final disposition.    Identified Goals and Safety Issues:  History of aggressive behaviors.     Legal Status: Legal Status at Admission: Voluntary/Patient has signed consent for treatment    Psychiatry Consult: Yes    Updated RN and Attending  regarding plan of care.      Melisa Callahan

## 2024-11-25 NOTE — PROGRESS NOTES
[facility]    Background: Transitional Care Management program identified per system criteria and reviewed by [facility] team for possible outreach.    Assessment: Upon chart review, [facility] Team member will not proceed with patient outreach related to this episode of Transitional Care Management program due to reason below:    Patient has presented to Emergency Department, been readmitted to hospital, or transferred to another hospital.    Plan: Transitional Care Management episode addressed appropriately per reason noted above.      GREG Parks  [facility], [facility]    *Connected Care Resource Team does NOT follow patient ongoing. Referrals are identified based on internal discharge reports and the outreach is to ensure patient has an understanding of their discharge instructions.

## 2024-11-25 NOTE — ED PROVIDER NOTES
ED Provider Note  [facility]      History     Chief Complaint   Patient presents with    Suicidal     Patient states that she is suicidal with plan to continue to scratch herself, she also states that she feels sick this morning.      HPI  Sadaf Ross is a 24 year old female with a history of bipolar disorder, borderline personality disorder, ADHD, PTSD, SI, and frequent ED presentations with care plan in place who presents to the emergency department for SI.    Patient woke up this morning feeling that she was going to pass out, felt like crap, and felt she was going to have a seizure.  Patient is having suicidal thoughts.  Patient has not been taking her medication.  Patient was feeling better when she left the hospital after recent admission 11/4/2024 - 11/22/2024.  Patient currently lives with mother    Past Medical History  Past Medical History:   Diagnosis Date    ADHD (attention deficit hyperactivity disorder)     Bipolar 1 disorder (H)     Borderline personality disorder (H)     Depressive disorder     Intellectual disability     Obesity     Syncope      Past Surgical History:   Procedure Laterality Date    APPENDECTOMY       acetaminophen (TYLENOL) 325 MG tablet  albuterol (PROAIR HFA/PROVENTIL HFA/VENTOLIN HFA) 108 (90 Base) MCG/ACT inhaler  ARIPiprazole lauroxil ER (ARISTADA) 882 MG/3.2ML intra-muscular  benztropine (COGENTIN) 1 MG tablet  cetirizine (ZYRTEC) 10 MG tablet  cloNIDine (CATAPRES) 0.1 MG tablet  clotrimazole (LOTRIMIN) 1 % external cream  dicyclomine (BENTYL) 20 MG tablet  divalproex sodium extended-release (DEPAKOTE ER) 500 MG 24 hr tablet  docusate sodium (COLACE) 50 MG capsule  FLUoxetine (PROZAC) 40 MG capsule  hydrOXYzine HCl (ATARAX) 25 MG tablet  lactase (LACTAID) 3000 UNIT tablet  levothyroxine (SYNTHROID/LEVOTHROID) 25 MCG tablet  multivitamin w/minerals (MULTI-VITAMIN) tablet  OLANZapine (ZYPREXA) 5 MG tablet  OLANZapine (ZYPREXA) 7.5 MG  tablet  ondansetron (ZOFRAN) 8 MG tablet  pantoprazole (PROTONIX) 40 MG EC tablet  polyethylene glycol (MIRALAX) 17 GM/Dose powder  promethazine (PHENERGAN) 12.5 MG tablet  senna-docusate (SENOKOT-S/PERICOLACE) 8.6-50 MG tablet  sodium chloride 0.65 % nasal spray  traZODone (DESYREL) 100 MG tablet  Vitamin D, Cholecalciferol, 25 MCG (1000 UT) TABS      Allergies   Allergen Reactions    Penicillins Rash and Unknown     Family History  Family History   Problem Relation Age of Onset    Diabetes Type 1 Father     Cancer Paternal Grandfather      Social History   Social History     Tobacco Use    Smoking status: Former     Current packs/day: 0.25     Average packs/day: 0.3 packs/day for 5.0 years (1.3 ttl pk-yrs)     Types: Cigarettes, Vaping Device     Passive exposure: Past    Smokeless tobacco: Never   Substance Use Topics    Alcohol use: No    Drug use: Never      A medically appropriate review of systems was performed with pertinent positives and negatives noted in the HPI, and all other systems negative.    Physical Exam   BP: 108/70  Pulse: 72  Temp: 98.1  F (36.7  C)  Resp: 18  SpO2: 100 %  Physical Exam  Vitals and nursing note reviewed.   Constitutional:       General: She is not in acute distress.     Appearance: Normal appearance. She is not diaphoretic.   HENT:      Head: Atraumatic.      Mouth/Throat:      Mouth: Mucous membranes are moist.   Eyes:      General: No scleral icterus.     Conjunctiva/sclera: Conjunctivae normal.   Cardiovascular:      Rate and Rhythm: Normal rate.      Heart sounds: Normal heart sounds.   Pulmonary:      Effort: No respiratory distress.      Breath sounds: Normal breath sounds.   Abdominal:      General: Abdomen is flat.   Musculoskeletal:      Cervical back: Neck supple.   Skin:     General: Skin is warm.      Findings: No rash.   Neurological:      Mental Status: She is alert.   Psychiatric:         Attention and Perception: Attention normal.         Mood and Affect: Mood is  anxious and depressed.         Speech: Speech normal.         Behavior: Behavior is cooperative.         Thought Content: Thought content includes homicidal and suicidal ideation. Thought content does not include homicidal or suicidal plan.         Judgment: Judgment is impulsive.           ED Course, Procedures, & Data      Procedures                 No results found for any visits on 11/25/24.  Medications   albuterol (PROVENTIL HFA/VENTOLIN HFA) inhaler (has no administration in time range)   benztropine (COGENTIN) tablet 1 mg (1 mg Oral $Given 11/25/24 1010)   dicyclomine (BENTYL) tablet 20 mg (20 mg Oral $Given 11/25/24 1010)   FLUoxetine (PROzac) capsule 40 mg (40 mg Oral $Given 11/25/24 1010)   hydrOXYzine HCl (ATARAX) tablet 25-50 mg (50 mg Oral $Given 11/25/24 1744)   lactase (LACTAID) tablet 3,000 Units (has no administration in time range)   levothyroxine (SYNTHROID/LEVOTHROID) tablet 25 mcg (25 mcg Oral $Given 11/25/24 1010)   OLANZapine (zyPREXA) tablet 5 mg (has no administration in time range)   ondansetron (ZOFRAN) tablet 8 mg (has no administration in time range)   pantoprazole (PROTONIX) EC tablet 40 mg (40 mg Oral $Given 11/25/24 1010)   polyethylene glycol (MIRALAX) powder 17 g (has no administration in time range)   divalproex sodium extended-release (DEPAKOTE ER) 24 hr tablet 500 mg (has no administration in time range)   OLANZapine (zyPREXA) tablet 7.5 mg (has no administration in time range)   promethazine (PHENERGAN) tablet 12.5 mg (has no administration in time range)   senna-docusate (SENOKOT-S/PERICOLACE) 8.6-50 MG per tablet 1 tablet (has no administration in time range)   traZODone (DESYREL) tablet 100 mg (has no administration in time range)   OLANZapine zydis (zyPREXA) ODT tab 10 mg (10 mg Oral $Given 11/25/24 1137)     Labs Ordered and Resulted from Time of ED Arrival to Time of ED Departure - No data to display  No orders to display      -----  Observation Addendum  With this  Addendum, this ED Provider Note may also serve as an Observation H&P    Observation Initiation Date: Nov 25, 2024    Patient presenting with suicidal and homicidal thoughts, feeling emotional..    A DEC assessment was completed, and the case was discussed with the . The  recommended observation status, home medications, psych consult, serial assessments. See separate DEC note from today's date for details on the assessment.    During the initial care period, the patient did not require medications for agitation, and did not require restraints/seclusion for patient and/or provider safety.     The patient's outpatient medications were reconciled and ordered.     The patient was found to have a psychiatric condition that would benefit from an observation stay in the emergency department for further psychiatric stabilization and/or coordination of a safe disposition. The observation plan includes serial assessments of psychiatric condition, potential administration of medications if indicated, further disposition pending the patient's psychiatric course during the monitoring period.   -----      Critical care was not performed.     Medical Decision Making  The patient's presentation was of high complexity (a chronic illness severe exacerbation, progression, or side effect of treatment).    The patient's evaluation involved:  an assessment requiring an independent historian (patient's mother provides collateral information to assess)  review of external note(s) from 3+ sources (review of multiple prior ED visits in [facility] dated November 2024)  ordering and/or review of 3+ test(s) in this encounter (has refused lab draws)  discussion of management or test interpretation with another health professional (DEC )    The patient's management necessitated moderate risk (prescription drug management including medications given in the ED) and high risk (a decision regarding hospitalization).    Assessment &  Plan    Patient with a history of multiple previous psychiatric diagnosis, who presents frequently to the ED for either somatoform or psychiatric complaints.  She presented today stating that she was feeling angry and suicidal, also feeling like hurting other people.  She did not specify a specific person she wanted to hurt or plan.  She has no plan for suicide.  The patient was also seen by the DEC , please refer to their extensive note/evaluation which was reviewed with me and is documented in EPIC on 11/25/2024 for further details.  The patient has a history of borderline personality, intellectual disability, PTSD, and frequent ED presentation with a care plan in epic that she is unlikely to benefit from inpatient hospitalization.  Unfortunately today however, she will not engage in any type of safety planning, and was not able to regulate herself over more than 8 hours in the ED even while receiving her regular medications.  Ultimately the plan was for me to place the patient into observation care for serial reassessment by extended care and psychiatry consult.  The patient will agree and hopefully can return to baseline.    I have reviewed the nursing notes. I have reviewed the findings, diagnosis, plan and need for follow up with the patient.    New Prescriptions    No medications on file       Final diagnoses:   Borderline personality disorder (H)   PTSD (post-traumatic stress disorder)   Intellectual disability   Suicidal thoughts   I, Michael Mcgee, am serving as a trained medical scribe to document services personally performed by Leo Lowe MD, based to the provider's statements to me.     I, Leo Lowe MD, was physically present and have reviewed and verified the accuracy of this note documented by Michael Mcgee.       Leo Lowe MD  [facility] EMERGENCY DEPARTMENT  11/25/2024     Leo Lowe MD  11/25/24 7202

## 2024-11-25 NOTE — ED TRIAGE NOTES
Patient arrives by EMS after calling 911 stating that she was having anxiety and felt she was going to have a seizure.      Triage Assessment (Adult)       Row Name 11/25/24 0916          Triage Assessment    Airway WDL WDL        Respiratory WDL    Respiratory WDL WDL        Skin Circulation/Temperature WDL    Skin Circulation/Temperature WDL WDL        Cardiac WDL    Cardiac WDL WDL        Peripheral/Neurovascular WDL    Peripheral Neurovascular WDL WDL        Cognitive/Neuro/Behavioral WDL    Cognitive/Neuro/Behavioral WDL WDL

## 2024-11-25 NOTE — PHARMACY-ADMISSION MEDICATION HISTORY
Admission Medication History Completed by Pharmacy    See [facility] Admission Navigator for allergy information, preferred outpatient pharmacy, prior to admission medications and immunization status.     Medication history sources:  Discharge Summary from 11/22; Laura     Pertinent changes made to PTA medication list:  Added: N/A  Deleted: N/A  Changed: N/A    Additional medication history information:   - Patient not interviewed as part of this medication history in light of recent discharge. Additional OTC medications/supplements may not be found in the list below.     Prior to Admission medications    Medication Sig Last Dose Taking? Auth Provider Long Term End Date   acetaminophen (TYLENOL) 325 MG tablet Take 650 mg by mouth every 6 hours as needed for mild pain  Yes Unknown, Entered By History     albuterol (PROAIR HFA/PROVENTIL HFA/VENTOLIN HFA) 108 (90 Base) MCG/ACT inhaler Inhale 1 puff into the lungs every 6 hours as needed for shortness of breath.  Yes Reported, Patient No    ARIPiprazole lauroxil ER (ARISTADA) 882 MG/3.2ML intra-muscular Inject 882 mg into the muscle every 28 days On the 22nd of each month  Yes Unknown, Entered By History     benztropine (COGENTIN) 1 MG tablet Take 1 mg by mouth 2 times daily.  Yes Unknown, Entered By History No    cetirizine (ZYRTEC) 10 MG tablet Take 10 mg by mouth daily.  Yes Reported, Patient     cloNIDine (CATAPRES) 0.1 MG tablet Take 1 tablet (0.1 mg) by mouth at bedtime.  Yes Naegele, Debra Ann, APRN CNS Yes    clotrimazole (LOTRIMIN) 1 % external cream Apply topically 2 times daily.  Yes Reported, Patient     dicyclomine (BENTYL) 20 MG tablet Take 20 mg by mouth 2 times daily.  Yes Unknown, Entered By History     divalproex sodium extended-release (DEPAKOTE ER) 500 MG 24 hr tablet Take 1 tablet (500 mg) by mouth at bedtime.  Yes Naegele, Debra Ann, APRN CNS Yes    docusate sodium (COLACE) 50 MG capsule Take 100 mg by mouth 2 times daily  Yes Reported, Patient      FLUoxetine (PROZAC) 40 MG capsule Take 1 capsule (40 mg) by mouth daily.  Yes Naegele, Debra Ann, APRN CNS Yes    hydrOXYzine HCl (ATARAX) 25 MG tablet Take 1-2 tablets (25-50 mg) by mouth every 4 hours as needed for anxiety.  Yes Naegele, Debra Ann, APRN CNS No    lactase (LACTAID) 3000 UNIT tablet Take 1 tablet (3,000 Units) by mouth 3 times daily as needed for indigestion  Yes Robert Olea MD     levothyroxine (SYNTHROID/LEVOTHROID) 25 MCG tablet Take 25 mcg by mouth daily.  Yes Unknown, Entered By History No    multivitamin w/minerals (MULTI-VITAMIN) tablet Take 1 tablet by mouth daily.  Yes Reported, Patient     OLANZapine (ZYPREXA) 5 MG tablet Take 1 tablet (5 mg) by mouth daily as needed (agitation).  Yes Naegele, Debra Ann, APRN CNS Yes    OLANZapine (ZYPREXA) 7.5 MG tablet Take 1 tablet (7.5 mg) by mouth at bedtime.  Yes Naegele, Debra Ann, APRN CNS Yes    ondansetron (ZOFRAN) 8 MG tablet Take 1 tablet (8 mg) by mouth every 8 hours as needed for nausea.  Yes Naegele, Debra Ann, APRN CNS No    pantoprazole (PROTONIX) 40 MG EC tablet Take 1 tablet (40 mg) by mouth daily.  Yes Naegele, Debra Ann, APRN CNS No    polyethylene glycol (MIRALAX) 17 GM/Dose powder Take 1 Capful by mouth daily as needed for constipation.  Yes Reported, Patient No    promethazine (PHENERGAN) 12.5 MG tablet Take 1 tablet (12.5 mg) by mouth every 6 hours as needed for nausea.  Yes Kelley Campos MD     senna-docusate (SENOKOT-S/PERICOLACE) 8.6-50 MG tablet Take 1 tablet by mouth 2 times daily as needed.  Yes Unknown, Entered By History     sodium chloride 0.65 % nasal spray Spray 1 spray in nostril daily as needed.  Yes Reported, Patient     traZODone (DESYREL) 100 MG tablet Take 100 mg by mouth at bedtime.  Yes Unknown, Entered By History No    Vitamin D, Cholecalciferol, 25 MCG (1000 UT) TABS Take 1,000 Units by mouth daily   Yes Reported, Patient            Date completed: 11/25/24    Medication history completed by:    Soraya Crowell, PharmD  *[phone]

## 2024-11-25 NOTE — ED NOTES
Patient became irritable after her DEC assessment and was brought back to [facility]. This RN heard her yell verbal abuse towards . At this time this RN approached patient, who yelled that she wanted to die, would do it once she leaves, and that she wants to strangle people. She was able to be redirected to calm herself, but did start to pick at her wrist and bite her hand. Sadaf did stop these behaviors when having a conversation about safety. Sitter present and providing a therapeutic environment.

## 2024-11-26 ENCOUNTER — HOSPITAL ENCOUNTER (OUTPATIENT)
Facility: CLINIC | Age: 24
Setting detail: OBSERVATION
Discharge: HOME | End: 2024-11-28
Attending: EMERGENCY MEDICINE | Admitting: EMERGENCY MEDICINE
Payer: MEDICARE

## 2024-11-26 VITALS
SYSTOLIC BLOOD PRESSURE: 112 MMHG | DIASTOLIC BLOOD PRESSURE: 76 MMHG | HEART RATE: 83 BPM | TEMPERATURE: 98.8 F | RESPIRATION RATE: 16 BRPM | OXYGEN SATURATION: 99 %

## 2024-11-26 DIAGNOSIS — F60.3 BORDERLINE PERSONALITY DISORDER (H): ICD-10-CM

## 2024-11-26 DIAGNOSIS — R45.851 SUICIDAL IDEATION: ICD-10-CM

## 2024-11-26 PROCEDURE — 250N000013 HC RX MED GY IP 250 OP 250 PS 637: Performed by: EMERGENCY MEDICINE

## 2024-11-26 PROCEDURE — 99223 1ST HOSP IP/OBS HIGH 75: CPT | Performed by: EMERGENCY MEDICINE

## 2024-11-26 PROCEDURE — 81025 URINE PREGNANCY TEST: CPT | Performed by: EMERGENCY MEDICINE

## 2024-11-26 PROCEDURE — 80307 DRUG TEST PRSMV CHEM ANLYZR: CPT | Mod: GZ | Performed by: EMERGENCY MEDICINE

## 2024-11-26 PROCEDURE — G0378 HOSPITAL OBSERVATION PER HR: HCPCS

## 2024-11-26 PROCEDURE — 99238 HOSP IP/OBS DSCHRG MGMT 30/<: CPT | Performed by: FAMILY MEDICINE

## 2024-11-26 PROCEDURE — 250N000013 HC RX MED GY IP 250 OP 250 PS 637: Performed by: FAMILY MEDICINE

## 2024-11-26 PROCEDURE — 99285 EMERGENCY DEPT VISIT HI MDM: CPT | Mod: 25 | Performed by: EMERGENCY MEDICINE

## 2024-11-26 RX ORDER — OLANZAPINE 5 MG/1
5 TABLET, ORALLY DISINTEGRATING ORAL ONCE
Status: COMPLETED | OUTPATIENT
Start: 2024-11-26 | End: 2024-11-26

## 2024-11-26 RX ORDER — ACETAMINOPHEN 325 MG/1
650 TABLET ORAL EVERY 4 HOURS PRN
Status: DISCONTINUED | OUTPATIENT
Start: 2024-11-26 | End: 2024-11-26 | Stop reason: HOSPADM

## 2024-11-26 RX ADMIN — OLANZAPINE 5 MG: 5 TABLET, ORALLY DISINTEGRATING ORAL at 21:19

## 2024-11-26 RX ADMIN — ACETAMINOPHEN 650 MG: 325 TABLET, FILM COATED ORAL at 09:28

## 2024-11-26 RX ADMIN — FLUOXETINE HYDROCHLORIDE 40 MG: 20 CAPSULE ORAL at 08:35

## 2024-11-26 RX ADMIN — OLANZAPINE 5 MG: 5 TABLET, ORALLY DISINTEGRATING ORAL at 19:51

## 2024-11-26 RX ADMIN — DICYCLOMINE HYDROCHLORIDE 20 MG: 20 TABLET ORAL at 08:36

## 2024-11-26 RX ADMIN — PANTOPRAZOLE SODIUM 40 MG: 40 TABLET, DELAYED RELEASE ORAL at 08:35

## 2024-11-26 RX ADMIN — SENNOSIDES AND DOCUSATE SODIUM 1 TABLET: 50; 8.6 TABLET ORAL at 08:35

## 2024-11-26 RX ADMIN — BENZTROPINE MESYLATE 1 MG: 1 TABLET ORAL at 08:36

## 2024-11-26 RX ADMIN — HYDROXYZINE HYDROCHLORIDE 50 MG: 25 TABLET, FILM COATED ORAL at 08:35

## 2024-11-26 RX ADMIN — LEVOTHYROXINE SODIUM 25 MCG: 25 TABLET ORAL at 08:36

## 2024-11-26 ASSESSMENT — ACTIVITIES OF DAILY LIVING (ADL)
ADLS_ACUITY_SCORE: 55

## 2024-11-26 ASSESSMENT — COLUMBIA-SUICIDE SEVERITY RATING SCALE - C-SSRS
2. HAVE YOU ACTUALLY HAD ANY THOUGHTS OF KILLING YOURSELF?: YES
SUICIDE, SINCE LAST CONTACT: NO
ATTEMPT SINCE LAST CONTACT: NO
TOTAL  NUMBER OF INTERRUPTED ATTEMPTS SINCE LAST CONTACT: NO
6. HAVE YOU EVER DONE ANYTHING, STARTED TO DO ANYTHING, OR PREPARED TO DO ANYTHING TO END YOUR LIFE?: NO
TOTAL  NUMBER OF ABORTED OR SELF INTERRUPTED ATTEMPTS SINCE LAST CONTACT: NO
1. SINCE LAST CONTACT, HAVE YOU WISHED YOU WERE DEAD OR WISHED YOU COULD GO TO SLEEP AND NOT WAKE UP?: YES

## 2024-11-26 NOTE — DISCHARGE INSTRUCTIONS
Follow up with established providers and supports as scheduled. Continue taking medications as prescribed. Utilize your [facility] mental health crisis team as needed. They are available 24/7.    Health Care Follow-up:   [facility] Intake appointment: Thursday, December 12, 2024 @ 11:30am via Phone  Provider: YOSHI Guzman  Address: [address], [address], [place] [address]  Phone: [phone] Fax: [phone]  Notes: At the time of your appointment, you will receive a phone call @ [phone]. All subsequent appointments will be in-person at your residence. If you prefer a Telehealth/video visit for this appointment, please call the clinic with an updated email address.      Appointment Date/Time: Thursday, December 19th @ 8am  Psychiatrist/Primary Care Giver: LINN Tong CNP   Address: [facility]   Phone Number: [phone]     Appointment Date/Time: Wednesday December 18th @ 9am    Therapist: Yonathan Pereira    Address: [facility]   Phone Number: [phone]           [facility]:  [facility] ([phone]) received your referral and will follow up with you to schedule a tour.   This is a place where you can go to spend time with other people, socialize and build friendships.

## 2024-11-26 NOTE — CONSULTS
"[facility]  Department of Psychiatry  Consultation note    Sadaf Ross MRN: [id number]   Age: 24 year old YOB: 2000     History     Chief Complaint   Patient presents with    Suicidal     Patient states that she is suicidal with plan to continue to scratch herself, she also states that she feels sick this morning.      HPI  Sadaf Ross is a 24 year old female with history notable for borderline personality disorder, ADHD, PTSD, chronic suicidal ideation, and frequent ED visits who presented to the ED today via EMS for suicidal ideation with a plan to \"die by scratching and biting.\" She has a historical diagnosis of bipolar disorder. She was recently on [facility] from 24 to 24. She reports that she found inpatient treatment helpful because that modality gives her the platform to talk things through.    On examination, she was lying down in her bed in the hallway but agreed to walk to the consult room to talk. Gait is slow but normal. She reports that she is in the ED because she has been trying to commit suicide because \"I just want to die and be with my Dad.\" Her Dad  on 2021 at age 45 of diabetes and something cardiovascular. Her plan is to scratch and bite her arms and hands. She reports numerous attempts since she was 17 years old, with the most recent attempt in the ED just prior to the interview. She scratched her arm to demonstrate. She did not break or abrade the skin.     She also endorses homicidal thoughts, though she denies having any particular target. She does have a plan though, which is to hit or punch people. She denies a history of aggression. She reports poor sleep for the past couple of weeks, with initial and middle insomnia and \"very bad nightmares.\" However, the past 2 weeks would have been the last 2 weeks out of her almost 3 week inpatient admission. Her appetite has decreased in the same time period. " "The discharge summary indicates that she reported improved mood and denied suicidal ideation. She had also not engaged in self-injurious behaviors during her last week in the hospital. It was noted during that admission that she needed individual supervision because she threatened to harm herself if she did not have 1:1 sitter. She was also reported as having referred to staff and other patients as her best friends, giving rise to concerns about secondary gains.    She denies substance use. She reports that she \"just hear things, like just negativity, my mind telling me to jump off a bridge or get hit by a car.\" She reports that she wants to be admitted to an inpatient unit because \"My Mom asked me to sign myself in.\" Towards the conclusion of the interview, she said that she was feeling dizzy and did not know if she could walk. She then started very slowly folding over in her chair, but did lean back and rested her head on the wall when I asked her to do so while I get help. I went to get her sitter and together, we escorted her back to her bed. This was the same way that my interview with her on 11/4/24 ended.    She has not required locked seclusion or restraints and has been cooperative with all cares.     Past Medical History  Past Medical History:   Diagnosis Date    ADHD (attention deficit hyperactivity disorder)     Bipolar 1 disorder (H)     Borderline personality disorder (H)     Depressive disorder     Intellectual disability     Obesity     Syncope      Past Surgical History:   Procedure Laterality Date    APPENDECTOMY       acetaminophen (TYLENOL) 325 MG tablet  albuterol (PROAIR HFA/PROVENTIL HFA/VENTOLIN HFA) 108 (90 Base) MCG/ACT inhaler  ARIPiprazole lauroxil ER (ARISTADA) 882 MG/3.2ML intra-muscular  benztropine (COGENTIN) 1 MG tablet  cetirizine (ZYRTEC) 10 MG tablet  cloNIDine (CATAPRES) 0.1 MG tablet  clotrimazole (LOTRIMIN) 1 % external cream  dicyclomine (BENTYL) 20 MG tablet  divalproex " sodium extended-release (DEPAKOTE ER) 500 MG 24 hr tablet  docusate sodium (COLACE) 50 MG capsule  FLUoxetine (PROZAC) 40 MG capsule  hydrOXYzine HCl (ATARAX) 25 MG tablet  lactase (LACTAID) 3000 UNIT tablet  levothyroxine (SYNTHROID/LEVOTHROID) 25 MCG tablet  multivitamin w/minerals (MULTI-VITAMIN) tablet  OLANZapine (ZYPREXA) 5 MG tablet  OLANZapine (ZYPREXA) 7.5 MG tablet  ondansetron (ZOFRAN) 8 MG tablet  pantoprazole (PROTONIX) 40 MG EC tablet  polyethylene glycol (MIRALAX) 17 GM/Dose powder  promethazine (PHENERGAN) 12.5 MG tablet  senna-docusate (SENOKOT-S/PERICOLACE) 8.6-50 MG tablet  sodium chloride 0.65 % nasal spray  traZODone (DESYREL) 100 MG tablet  Vitamin D, Cholecalciferol, 25 MCG (1000 UT) TABS      Allergies   Allergen Reactions    Penicillins Rash and Unknown     Family History  Family History   Problem Relation Age of Onset    Diabetes Type 1 Father     Cancer Paternal Grandfather      Social History   Social History     Tobacco Use    Smoking status: Former     Current packs/day: 0.25     Average packs/day: 0.3 packs/day for 5.0 years (1.3 ttl pk-yrs)     Types: Cigarettes, Vaping Device     Passive exposure: Past    Smokeless tobacco: Never   Substance Use Topics    Alcohol use: No    Drug use: Never      Past medical history, past surgical history, medications, allergies, family history, and social history were reviewed with the patient. No additional pertinent items.       Review of Systems  A medically appropriate review of systems was performed with pertinent positives and negatives noted in the HPI, and all other systems negative.    Physical Examination   BP: 108/70  Pulse: 72  Temp: 98.1  F (36.7  C)  Resp: 18  SpO2: 100 %    Physical Exam  General: Appears stated age.   Neuro: Alert and fully oriented. Extremities appear to demonstrate normal strength on visual inspection.   Integumentary/Skin: no rash visualized, normal color    Psychiatric Examination   Appearance: awake, alert,  "adequately groomed, and casually dressed  Attitude:  cooperative  Eye Contact:  fair  Mood:  sad  and hopeless  Affect:  mood congruent and intensity is flat  Speech:  clear, coherent  Psychomotor Behavior:  intact station, gait and muscle tone and tremor observed   Thought Process:  goal oriented  Associations:  no loose associations  Thought Content:  active suicidal ideation present and obsessions present  Insight:   poor  Judgement:  poor  Oriented to:  time, person, and place  Attention Span and Concentration:  fair  Recent and Remote Memory:  intact  Language: able to name/identify objects without impairment  Fund of Knowledge: intact with awareness of current and past events    ED Course        Labs Ordered and Resulted from Time of ED Arrival to Time of ED Departure - No data to display    Assessments & Plan (with Medical Decision Making)   Patient presenting with chronic suicidal ideation that has worsened in the past 2 weeks, which would have been when she was still on the inpatient unit. She was reported to have denied suicidal thoughts when she was discharged on 11/22. When asked how inpatient admission can help her, she said that being able to talk things through was helpful. However, she has an established therapist she has been working with, and it would be better to talk things through with her therapist since they already have an established therapeutic alliance. She is not asking for any medication changes, although she did say that her medications are \"messing me up. My morning and bedtime meds make me tired.\" She does report that Aristada has been helpful with mood stability, though she hastened to add that it was \"only sometimes.\" She also has an established psychiatrist managing her medications.      It was noted that secondary gains may be a motivator for being on an inpatient unit. She threatened to hurt herself if she was not assigned a 1:1 sitter, and demonstrated how she will do this by " engaging in mild scratching. She referred to the staff and other patients as her best friends. Sadaf may benefit more from DBT as well as social skills training. Her request to be admitted may be stemming from loneliness and lack of skills needed for initiating and maintaining friendships.     Nursing notes reviewed noting no acute issues.     I have reviewed the assessment completed by the [facility].     Preliminary diagnosis:    ICD-10-CM    1. Borderline personality disorder (H)  F60.3       2. PTSD (post-traumatic stress disorder)  F43.10       3. Intellectual disability  F79       4. Suicidal thoughts  R45.851            Treatment Plan:  - discharge back to her Mom's home when she feels sufficiently reconstituted. She has chronic suicidal thoughts but no history of serious attempts. She was just discharged 3 days ago, and her worsening depression and suicidal thoughts ostensibly started on the second week of her three week stay. This negates what she was reported to have said about feeling better and not having suicidal thoughts when she was discharged.    - continue PTA medications.    - follow up with established therapist and psychiatrist.    - consider DBT and social skills training. She may benefit from PHP, or a referral to [facility].      --  LINN Duarte CNP   [facility] EMERGENCY DEPARTMENT

## 2024-11-26 NOTE — ED NOTES
Pt has what appears to be an old scratch bianca on her right shin.  Pt c/o pain 8/10 (0 being no pain and 10 being most painful).  Slight redness to the sight.  Bacitracin applied with band aid applied per pt's request.

## 2024-11-26 NOTE — PROGRESS NOTES
"Triage and Transition Services Extended Care Reassessment     Patient: Sadaf goes by \"Sadaf,\" uses she/her pronouns  Date of Service: November 26, 2024  Site of Service: [facility] EMERGENCY DEPARTMENT                             UREDH-A  Patient was seen yes  Mode of Assessment: Virtual: iPad     Reason for Reassessment: worsening psychosocial stress, verbal agitation, suicidal ideation    History of Patient's Original Emergency Room Encounter: Pt has a hx of frequent ED visits and chronic SI. Has an ED care plan. Patient's most recent  mental health was in 11/4-11/22/2024. Patient discharged home to mom's house this past Friday (11/22/24). Pt has a hx of intellectual disability, borderline personality disorder, bipolar affective disorder, ADHD and PTSD. Hx of NSSI via biting herself, headbanging or scratching herself. Pt was residing at a group home, but moved on in September and has been living with her mother since. Pt has long struggled with the loss of her father and has presented to the ED on multiple occasions when she is very sad and reports she is missing him. Pt has a hx of struggling to utilize her coping skills before coming to the ED or calling 911.    Current Patient Presentation: Attempted to meet with patient today via ipad. She initially presented as willing to engage but quickly became frustrated and walked out of the consult room.    Presentation Summary: Writer introduced self and reviewed plan of care, going over initial assessment recommendation for observation followed by psychiatry's recommendation for discharge. Pt asked if she was on a 72 hr hold. Reiterated that she was no on the IP list. She immediately reacted and yelled 'I'm going to kill myself when I get the fuck out of here' and walked out of the consult room, ending the assessment. RN asked patient later if she would be willing to meet with writer but she declined.    Changes Observed Since Initial Assessment: " provider request    Therapeutic Interventions Provided:  n/a    Current Symptoms:   unable to assess, irritability and anger observed in brief interaction    Mental Status Exam   Affect: Dramatic  Appearance: Appropriate  Attention Span/Concentration: Attentive  Eye Contact: Engaged    Fund of Knowledge: Appropriate   Language /Speech Content: Fluent  Language /Speech Volume: Normal, Loud  Language /Speech Rate/Productions: Normal  Recent Memory: Intact  Remote Memory: Intact  Mood: Irritable, Angry  Orientation to Person: Yes   Orientation to Place: Yes  Orientation to Time of Day: Yes  Orientation to Date: Yes     Situation (Do they understand why they are here?): Yes  Psychomotor Behavior: Agitated  Thought Content: Suicidal, Clear  Thought Form: Goal Directed    Treatment Objective(s) Addressed:  n/a    Patient Response to Interventions: unacceptance expressed, needs reinforcement    Progress Towards Goals:  Patient Reports Symptoms Are: ongoing  Patient Progress Toward Goals: is not making progress  Comment: Pt remains in the ER with expectations that she will be admitted to a mental health unit. Pt reports she feels depressed and unable to engage in safety planning.    Case Management: Case Management Included: collaborating with patient's support system  Details on Collaborating with Patient's Support System: Yarely Ross (Mother) [phone]  Summary of Interaction: Mom states that she does not know what to do. She states that Sadaf does not like to be alone and much of the family has to work. She reports that Sadaf seemed fine over the weekend when people were around. She reports that she is fine with Sadaf returning home. She reports concern that Sadaf is not getting all her medications as prescribed as the meds are locked up and there is not always someone there to administer them. Mom reports that meds/sharps remained locked up.     Also talked with pt's sister Morena  [phone], who also  lives in the home. She states that there is usually at least someone home as there are multiple people living in the home, but that Sadaf tends to isolate. She states that she always has an 'excuse of why she wants to go to the hospital.' Morena is willing to pick patient up today and bring her home.    Left voicemail for  Lesley Perez: [phone].    C-SSRS Since Last Contact:   1. Wish to be Dead (Since Last Contact): Yes  2. Non-Specific Active Suicidal Thoughts (Since Last Contact): Yes  3. Active Suicidal Ideation with any Methods (Not Plan) Without Intent to Act (Since Last Contact):  (declined to discuss)  4. Active Suicidal Ideation with Some Intent to Act, Without Specific Plan (Since Last Contact):  (declined to discuss)  5. Active Suicidal Ideation with Specific Plan and Intent (Since Last Contact):  (declined to discuss)  Most Severe Ideation Rating (Since Last Contact):  (pt would not participate in full risk assessment)  Frequency (Since Last Contact):  (declined to discuss)  Duration (Since Last Contact):  (declined to discuss)  Controllability (Since Last Contact):  (declined to discuss)  Deterrents (Since Last Contact):  (declined to discuss)  Reasons for Ideation (Since Last Contact):  (declined to discuss)  Actual Attempt (Since Last Contact): No  Has subject engaged in non-suicidal self-injurious behavior? (Since Last Contact): No  Interrupted Attempts (Since Last Contact): No  Aborted or Self-Interrupted Attempt (Since Last Contact): No  Preparatory Acts or Behavior (Since Last Contact): No  Suicide (Since Last Contact): No     Calculated C-SSRS Risk Score (Since Last Contact): pt would not participate in risk assessment today.     Plan: Final Disposition / Recommended Care Path: discharge  Plan for Care reviewed with assigned Medical Provider: yes  Plan for Care Team Review: provider, RN  Patient and/or validated legal guardian concurs: no    Clinical Substantiation: Sadaf  presented to the ED yesterday due to increased anxiety and SI. Per chart review, she has a history of Bipolar Disorder, Borderline Personality Disorder,  ADHD, PTSD, chronic suicidal thoughts and frequent ED presentations with care plan in place. She had a recent [facility] admission, discharged on 11/22 to her mom's home. It was noted in notes from admission and from psychiatry consult during this ED stay that pt refers to staff on the [facility] as her 'friends' and is very focused on returning to the inpatient unit. Per psychiatric consult from 11/25, 'It was noted that secondary gains may be a motivator for being on an inpatient unit. She threatened to hurt herself if she was not assigned a 1:1 sitter, and demonstrated how she will do this by engaging in mild scratching. She referred to the staff and other patients as her best friends. Sadaf may benefit more from DBT as well as social skills training. Her request to be admitted may be stemming from loneliness and lack of skills needed for initiating and maintaining friendships.' Sadaf continues to endorse SI and is not willing to engage in safety planning with writer at this time and she does present with some ongoing chronic risk of harm to self but it does not appear that this is likely mitigated by IP admission and there is some evidence that behaviors are exacerbated by admission. Risk of harm is mitigated by family restricting access to medications/sharps at home. Pt has no hx serious suicide attempts. Recommend discharge to home with follow-up with therapy, psychiatry, [facility] referral and [facility] referral.    Legal Status: Legal Status at Admission: Voluntary/Patient has signed consent for treatment    Session Start Time:    Session Stop Time:    CPT codes: Non-Billable  Time Spent: 6 minutes      CPT code(s) utilized: Non-Billable    Diagnosis:   Patient Active Problem List   Diagnosis Code    MENTAL HEALTH     Suicidal ideation R45.851    Suicidal ideations  R45.851    Intellectual disability F79    Bipolar affective disorder, currently depressed, moderate (H) F31.32    Borderline personality disorder (H) F60.3    Morbid obesity (H) E66.01    Unspecified mood (affective) disorder (H) F39    Chronic constipation K59.09    History of suicide attempt Z91.51    Hyperhidrosis R61    Major depressive disorder, recurrent, in full remission (H) F33.42    Vitamin D deficiency E55.9    Syncope R55    Seizure-like activity (H) R56.9    Syncope, unspecified syncope type R55    Bipolar affective disorder (H) F31.9    Has access to planned means of suicide R45.851    PTSD (post-traumatic stress disorder) F43.10    Suicidal thoughts R45.851       Primary Problem This Admission: Active Hospital Problems    PTSD (post-traumatic stress disorder)      Suicidal thoughts      *Bipolar affective disorder (H)      Borderline personality disorder (H)      Intellectual disability      Juanita Olivia, [facility]   Licensed Mental Health Professional (LMHP), [facility] Care  [phone]

## 2024-11-26 NOTE — ED PROVIDER NOTES
ED Observation Discharge Summary  [facility]  Discharge Date: 11/26/2024    Sadaf Ross MRN: [id number]   Age: 24 year old YOB: 2000     Brief HPI & Initial ED Course     Chief Complaint   Patient presents with    Suicidal     Patient states that she is suicidal with plan to continue to scratch herself, she also states that she feels sick this morning.      HPI  Sadaf Ross is a 24 year old female with PMH notable for bipolar disorder, borderline personality, ADHD, PTSD, frequent ED presentation who presented to the ED with a psychiatric concern.  Reported feeling emotionally dysregulated, reported suicidal thoughts.  States she did not feel her med changes during her recent hospitalization or working..      The patient was evaluated in the emergency department by a physician and DEC behavioral health . The DEC  recommended observation status.  She has a history of low frustration tolerance, poor emotional control, maladaptive coping mechanisms and was recently hospitalized for 18 days.  It was felt that she would be unlikely to benefit from an inpatient hospitalization and so she was placed in observation to take her medications and allow for time of de-escalation and psychiatric consultation. See separate DEC note from this encounter for details on the assessment. The patient's psychiatric state was such that she would benefit from ongoing monitoring. Observation care was initiated with the plan including serial assessments of psychiatric condition, potential administration of medications if indicated, and further disposition pending the patient's psychiatric course during the monitoring period.     See ED Observation H&P for further details on the patient's presenting history and initial evaluation.     Physical Exam   BP: 108/70  Pulse: 72  Temp: 98.1  F (36.7  C)  Resp: 18  SpO2: 100 %    Physical Exam  General: . Appears stated age.   HENT:  MMM, no oropharyngeal lesions  Eyes: PERRL, normal sclerae   Cardio: Regular rate, extremities well perfused  Resp: Normal work of breathing, normal respiratory rate  Neuro: alert and fully oriented. CN II-XII grossly intact. Grossly normal strength and sensation in all extremities.   MSK: no deformities.   Integumentary/Skin: no rash visualized, normal color  Psych: Calm affect, cooperative behavior.  Currently denies, admits to chronic SI, denies any current plan or intent.  Denies HI.  Denies hallucinations. Thought process logical. Insight fair.     Results      Procedures                  Labs Ordered and Resulted from Time of ED Arrival to Time of ED Departure   COMPREHENSIVE METABOLIC PANEL - Abnormal       Result Value    Sodium 136      Potassium 3.9      Carbon Dioxide (CO2) 21 (*)     Anion Gap 12      Urea Nitrogen 14.1      Creatinine 0.96 (*)     GFR Estimate 84      Calcium 8.6 (*)     Chloride 103      Glucose 107 (*)     Alkaline Phosphatase 53      AST 9      ALT 10      Protein Total 6.5      Albumin 3.8      Bilirubin Total <0.2     VALPROIC ACID - Abnormal    Valproic acid 14.9 (*)    CBC WITH PLATELETS AND DIFFERENTIAL - Abnormal    WBC Count 9.6      RBC Count 4.14      Hemoglobin 11.4 (*)     Hematocrit 33.6 (*)     MCV 81      MCH 27.5      MCHC 33.9      RDW 13.7      Platelet Count 248      % Neutrophils 54      % Lymphocytes 36      % Monocytes 6      % Eosinophils 3      % Basophils 0      % Immature Granulocytes 0      NRBCs per 100 WBC 0      Absolute Neutrophils 5.2      Absolute Lymphocytes 3.5      Absolute Monocytes 0.6      Absolute Eosinophils 0.3      Absolute Basophils 0.0      Absolute Immature Granulocytes 0.0      Absolute NRBCs 0.0     HCG QUALITATIVE URINE   ROUTINE UA WITH MICROSCOPIC REFLEX TO CULTURE            Observation Course   The patient was found to have a psychiatric condition that would benefit from an observation stay in the emergency department for further  psychiatric stabilization and/or coordination of a safe disposition. The plan upon observation admission included serial assessments of psychiatric condition, potential administration of medications if indicated, further disposition pending the patient's psychiatric course during the monitoring period.     Serial assessments of the patient's psychiatric condition were performed. Nursing notes were reviewed. During the observation period, the patient did require medications for agitation, and did not require restraints/seclusion for patient and/or provider safety.      The patient was also seen by the psychiatry consult service, extended care DEC , please refer to their extensive note/evaluation which was reviewed with me and is documented in EPIC on 11/26/2024 for further details.  The patient ultimately returned to her baseline.  Was agreeable to discharge, engaging in safety planning.  The patient has a long history of similar presentation.  Patient is now cooperative, more emotionally regulated, and appears to be back at baseline.  The patient does not appear to be currently above baseline risk to themself or others. Patient does have a higher than average risk for similar behaviors due to their past behaviors and diagnoses. This episode is unfortunately part of a pattern of maladaptive coping mechanisms, and seeking emergency department care or hospitalization A short acute hospitalization will not likely mitigate the long term risk, and is not recommended, nor a beneficial treatment for these behaviors.  Discharge was recommended, the patient was agreeable, and the patient's sister came and picked her up.      After the period in observation care, the patient's circumstances and mental state were safe for outpatient management. After counseling on the diagnosis, work-up, and treatment plan, the patient was discharged. Close follow-up with outpatient providers including a psychiatrist and/or therapist  was recommended and community psychiatric resources were provided. Patient is to return to the ED if any urgent or potentially life-threatening concerns.      Discharge Diagnoses:   Final diagnoses:   Borderline personality disorder (H)   PTSD (post-traumatic stress disorder)   Intellectual disability   Suicidal thoughts       --  Leo Lowe MD  [facility] EMERGENCY DEPARTMENT  11/26/2024      Leo Lowe MD  11/26/24 [id number]

## 2024-11-27 LAB — GLUCOSE BLDC GLUCOMTR-MCNC: 99 MG/DL (ref 70–99)

## 2024-11-27 PROCEDURE — G0378 HOSPITAL OBSERVATION PER HR: HCPCS

## 2024-11-27 PROCEDURE — 99214 OFFICE O/P EST MOD 30 MIN: CPT | Performed by: CLINICAL NURSE SPECIALIST

## 2024-11-27 PROCEDURE — 99238 HOSP IP/OBS DSCHRG MGMT 30/<: CPT | Performed by: FAMILY MEDICINE

## 2024-11-27 PROCEDURE — 82962 GLUCOSE BLOOD TEST: CPT | Mod: GZ

## 2024-11-27 PROCEDURE — 250N000013 HC RX MED GY IP 250 OP 250 PS 637: Performed by: EMERGENCY MEDICINE

## 2024-11-27 RX ORDER — ACETAMINOPHEN 325 MG/1
650 TABLET ORAL EVERY 6 HOURS PRN
Status: DISCONTINUED | OUTPATIENT
Start: 2024-11-27 | End: 2024-11-28 | Stop reason: HOSPADM

## 2024-11-27 RX ORDER — TRAZODONE HYDROCHLORIDE 50 MG/1
100 TABLET, FILM COATED ORAL AT BEDTIME
Status: DISCONTINUED | OUTPATIENT
Start: 2024-11-27 | End: 2024-11-28 | Stop reason: HOSPADM

## 2024-11-27 RX ORDER — MULTIPLE VITAMINS W/ MINERALS TAB 9MG-400MCG
1 TAB ORAL DAILY
Status: DISCONTINUED | OUTPATIENT
Start: 2024-11-27 | End: 2024-11-28 | Stop reason: HOSPADM

## 2024-11-27 RX ORDER — ONDANSETRON 8 MG/1
8 TABLET, FILM COATED ORAL EVERY 8 HOURS PRN
Status: DISCONTINUED | OUTPATIENT
Start: 2024-11-27 | End: 2024-11-28 | Stop reason: HOSPADM

## 2024-11-27 RX ORDER — DIVALPROEX SODIUM 500 MG/1
500 TABLET, FILM COATED, EXTENDED RELEASE ORAL AT BEDTIME
Status: DISCONTINUED | OUTPATIENT
Start: 2024-11-27 | End: 2024-11-28 | Stop reason: HOSPADM

## 2024-11-27 RX ORDER — CETIRIZINE HYDROCHLORIDE 10 MG/1
10 TABLET ORAL DAILY
Status: DISCONTINUED | OUTPATIENT
Start: 2024-11-27 | End: 2024-11-28 | Stop reason: HOSPADM

## 2024-11-27 RX ORDER — LEVOTHYROXINE SODIUM 25 UG/1
25 TABLET ORAL DAILY
Status: DISCONTINUED | OUTPATIENT
Start: 2024-11-27 | End: 2024-11-28 | Stop reason: HOSPADM

## 2024-11-27 RX ORDER — CHOLECALCIFEROL (VITAMIN D3) 125 MCG
3000 CAPSULE ORAL 3 TIMES DAILY PRN
Status: DISCONTINUED | OUTPATIENT
Start: 2024-11-27 | End: 2024-11-28 | Stop reason: HOSPADM

## 2024-11-27 RX ORDER — PANTOPRAZOLE SODIUM 40 MG/1
40 TABLET, DELAYED RELEASE ORAL DAILY
Status: DISCONTINUED | OUTPATIENT
Start: 2024-11-27 | End: 2024-11-28 | Stop reason: HOSPADM

## 2024-11-27 RX ORDER — ALBUTEROL SULFATE 90 UG/1
1 INHALANT RESPIRATORY (INHALATION) EVERY 6 HOURS PRN
Status: DISCONTINUED | OUTPATIENT
Start: 2024-11-27 | End: 2024-11-28 | Stop reason: HOSPADM

## 2024-11-27 RX ORDER — OLANZAPINE 5 MG/1
5 TABLET ORAL DAILY PRN
Status: DISCONTINUED | OUTPATIENT
Start: 2024-11-27 | End: 2024-11-28 | Stop reason: HOSPADM

## 2024-11-27 RX ORDER — HYDROXYZINE HYDROCHLORIDE 25 MG/1
25-50 TABLET, FILM COATED ORAL EVERY 4 HOURS PRN
Status: DISCONTINUED | OUTPATIENT
Start: 2024-11-27 | End: 2024-11-28 | Stop reason: HOSPADM

## 2024-11-27 RX ORDER — CLONIDINE HYDROCHLORIDE 0.1 MG/1
0.1 TABLET ORAL AT BEDTIME
Status: DISCONTINUED | OUTPATIENT
Start: 2024-11-27 | End: 2024-11-28 | Stop reason: HOSPADM

## 2024-11-27 RX ORDER — BENZTROPINE MESYLATE 1 MG/1
1 TABLET ORAL 2 TIMES DAILY
Status: DISCONTINUED | OUTPATIENT
Start: 2024-11-27 | End: 2024-11-28 | Stop reason: HOSPADM

## 2024-11-27 RX ADMIN — CETIRIZINE HYDROCHLORIDE 10 MG: 10 TABLET, FILM COATED ORAL at 08:22

## 2024-11-27 RX ADMIN — CLONIDINE HYDROCHLORIDE 0.1 MG: 0.1 TABLET ORAL at 01:11

## 2024-11-27 RX ADMIN — Medication 1 TABLET: at 08:22

## 2024-11-27 RX ADMIN — OLANZAPINE 7.5 MG: 5 TABLET, FILM COATED ORAL at 01:11

## 2024-11-27 RX ADMIN — BENZTROPINE MESYLATE 1 MG: 1 TABLET ORAL at 08:23

## 2024-11-27 RX ADMIN — CLONIDINE HYDROCHLORIDE 0.1 MG: 0.1 TABLET ORAL at 21:59

## 2024-11-27 RX ADMIN — FLUOXETINE HYDROCHLORIDE 40 MG: 20 CAPSULE ORAL at 08:22

## 2024-11-27 RX ADMIN — DIVALPROEX SODIUM 500 MG: 500 TABLET, FILM COATED, EXTENDED RELEASE ORAL at 01:11

## 2024-11-27 RX ADMIN — BENZTROPINE MESYLATE 1 MG: 1 TABLET ORAL at 22:00

## 2024-11-27 RX ADMIN — HYDROXYZINE HYDROCHLORIDE 50 MG: 25 TABLET, FILM COATED ORAL at 22:01

## 2024-11-27 RX ADMIN — PANTOPRAZOLE SODIUM 40 MG: 40 TABLET, DELAYED RELEASE ORAL at 08:23

## 2024-11-27 RX ADMIN — TRAZODONE HYDROCHLORIDE 100 MG: 50 TABLET ORAL at 01:11

## 2024-11-27 RX ADMIN — DIVALPROEX SODIUM 500 MG: 500 TABLET, FILM COATED, EXTENDED RELEASE ORAL at 22:00

## 2024-11-27 RX ADMIN — TRAZODONE HYDROCHLORIDE 100 MG: 50 TABLET ORAL at 22:00

## 2024-11-27 RX ADMIN — OLANZAPINE 7.5 MG: 5 TABLET, FILM COATED ORAL at 22:00

## 2024-11-27 RX ADMIN — LEVOTHYROXINE SODIUM 25 MCG: 25 TABLET ORAL at 08:23

## 2024-11-27 ASSESSMENT — ACTIVITIES OF DAILY LIVING (ADL)
ADLS_ACUITY_SCORE: 55

## 2024-11-27 ASSESSMENT — COLUMBIA-SUICIDE SEVERITY RATING SCALE - C-SSRS
1. SINCE LAST CONTACT, HAVE YOU WISHED YOU WERE DEAD OR WISHED YOU COULD GO TO SLEEP AND NOT WAKE UP?: YES
2. HAVE YOU ACTUALLY HAD ANY THOUGHTS OF KILLING YOURSELF?: YES
SUICIDE, SINCE LAST CONTACT: NO
TOTAL  NUMBER OF ABORTED OR SELF INTERRUPTED ATTEMPTS SINCE LAST CONTACT: NO
6. HAVE YOU EVER DONE ANYTHING, STARTED TO DO ANYTHING, OR PREPARED TO DO ANYTHING TO END YOUR LIFE?: NO
5. HAVE YOU STARTED TO WORK OUT OR WORKED OUT THE DETAILS OF HOW TO KILL YOURSELF? DO YOU INTEND TO CARRY OUT THIS PLAN?: NO
TOTAL  NUMBER OF INTERRUPTED ATTEMPTS SINCE LAST CONTACT: NO
ATTEMPT SINCE LAST CONTACT: NO
REASONS FOR IDEATION SINCE LAST CONTACT: MOSTLY TO GET ATTENTION, REVENGE, OR A REACTION FROM OTHERS

## 2024-11-27 NOTE — CONSULTS
"[facility] ED  Department of Psychiatry  Consultation note    Sadaf Ross MRN: [id number]   Age: 24 year old YOB: 2000     History     Chief Complaint   Patient presents with    Suicidal     HPI  Sadaf Ross is a 24 year old female with history notable for borderline personality disorder, ADHD, PTSD, chronic suicidal ideation, and frequent ED visits who presented to the ED on 11/26/24 after being discharged earlier in the day. She was here on 11/25 for suicidal ideation with a plan to scratch and bite herself. She had discharged home where she apparently became dysregulated after she was asked not to remove the still wet clothes of the other people she lives with from the [facility]. She could not be mollified and she called 911 to request transport to the ED.    She declined to talk to me today. Please refer to my note from 11/25 for her history.    Past Medical and Surgical History, Medications, Allergies, and Social History were reviewed in the electronic medical record. Review with the patient was attempted but limited due to  refusal to talk .       Review of Systems  A complete review of systems was attempted but limited due to lack of cooperation.    Physical Examination   BP: 127/80  Pulse: 68  Temp: 98.1  F (36.7  C)  Resp: 18  SpO2: 98 %    Physical Exam  General: Appears stated age.   Neuro: Alert and fully oriented. Extremities appear to demonstrate normal strength on visual inspection.   Integumentary/Skin: no rash visualized, normal color    Psychiatric Examination   Appearance: awake, alert, adequately groomed, and casually dressed  Attitude:  uncooperative  Eye Contact:  fair  Mood:   \"What do you care? I don't want to talk to you.\"  Affect:  intensity is blunted  Speech:  clear, coherent, was able to express herself  Psychomotor Behavior:  no evidence of tardive dyskinesia, dystonia, or tics  Thought Process:  goal " oriented  Associations:   did not speak enough for an adequate evaluation  Thought Content:   refused to participate  Insight:   poor  Judgement:  poor  Oriented to:  time, person, and place  Attention Span and Concentration:   unable to evaluate  Recent and Remote Memory:   unable to evaluate  Language: able to name/identify objects without impairment  Fund of Knowledge: low average    ED Course        Labs Ordered and Resulted from Time of ED Arrival to Time of ED Departure   HCG QUALITATIVE URINE - Normal       Result Value    hCG Urine Qualitative Negative     URINE DRUG SCREEN PANEL - Normal    Amphetamines Urine Screen Negative      Barbituates Urine Screen Negative      Benzodiazepine Urine Screen Negative      Cannabinoids Urine Screen Negative      Cocaine Urine Screen Negative      Fentanyl Qual Urine Screen Negative      Opiates Urine Screen Negative      PCP Urine Screen Negative         Assessments & Plan (with Medical Decision Making)   Patient presenting with chronic suicidal ideation.  She is requesting inpatient admission. She was in the ED on 11/25 for the same presentation, and agreed to discharge home yesterday. She then apparently became emotionally and behaviorally dysregulated when she was asked not to remove the still wet/damp clothes from the dryer at home, and she called 911 to ask them to take her to the ED last night.     She would benefit from developing emotion regulation, distress tolerance, and interpersonal skills. At this time, her default coping mechanism appears to be going to the ED, which is not a good long-term strategy. The ED is not the place where she can learn the aforementioned skills that would allow her to form the social connections that she desires, and which appears to be the motivator behind repeated requests to be admitted to an inpatient psychiatric unit. She had referred to patients and staff as her best friends, and threatened to hurt herself if she was not  assigned 1:1 staff at her most recent admission from 11/4 to 11/22. Admitting her would be detrimental as the negative coping mechanism will be reinforced, almost assuredly guaranteeing even more frequent presentation to the ED. There will be less of an impetus for Sadaf to learn more constructive ways of coping.    Nursing notes reviewed noting no acute issues.     I have reviewed the assessment completed by the [facility].     Preliminary diagnosis:    ICD-10-CM    1. Suicidal ideation  R45.851       2. Borderline personality disorder (H)  F60.3            Treatment Plan:  - patient refused to participate in an evaluation. However, I did just see her on 11/25/24, during which she fully participated. It is not likely that she now meets criteria for inpatient admission. She was just discharged on 11/22 from an 18-day stay on [facility]. She is not a reliable historian as she reports that her current increase in suicidal ideation has been ongoing for a couple of weeks, which would mean that this started during her second week in the hospital. However, she denied safety concerns and reported improved mood on the day she was discharged, so the chronology does not make sense. Based on her report of ongoing suicidal ideation, even when she was on the inpatient unit, it does not seem likely that inpatient admission will be beneficial in mitigating her symptoms.     - due to the chronic nature of her suicidal ideation, she is always going to be at increased risk of self-harm, especially as she tries to prove that she needs inpatient admission. This speaks to a strong behavioral component to her presentation, and a short inpatient admission will not be sufficient, nor is it the proper treatment setting, to address behaviors that cannot be modified by medications.    - as it appears that her desire to be in the hospital is in the context of not being able to tolerate being alone, as well as what appears to be an inability to get  her need for connection met outside of a hospital setting, it would be more beneficial in the long term for her to attend PHP, DBT, and a social skills training group. There is also [facility], [place] which is a drop-in center.     - continue PTA medications. No changes recommended.      --  LINN Duarte [facility] EMERGENCY DEPARTMENT
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
## 2024-11-27 NOTE — PHARMACY-ADMISSION MEDICATION HISTORY
Medication history completed on 11/25/2024 at [facility]. Please refer to this note for prior to admission medication information.    LILI MUHAMMAD, [facility]  [phone] and/or available on [employer]

## 2024-11-27 NOTE — ED PROVIDER NOTES
"    [facility] [place] ([facility])    11/26/24        History     Chief Complaint   Patient presents with    Suicidal     HPI  Sadaf Ross is a 24 year old female who with PMH of bipolar disorder, borderline personality, ADHD, PTSD, frequent ED presentation who presented to the ED with suicidal ideations.     As per triage note:   Patient called 911 from [place] with suicidal ideation and intent to run into traffic and kill herself. She claims she has not taken her medications today because she \"didn't have time.\"  Patient states that she has been arguing with her boyfriend.  She states that she is now suicidal with a plan to scratch herself.  Denies any recent illness or medical concerns.      As per Epic review:   The patient was presented to [facility] on 11/26/24 due to suicidal ideations.   The patient was found to have a psychiatric condition that would benefit from an observation stay in the emergency department for further psychiatric stabilization and/or coordination of a safe disposition. The plan upon observation admission included serial assessments of psychiatric condition, potential administration of medications if indicated, further disposition pending the patient's psychiatric course during the monitoring period.      During the observation period, the patient did require medications for agitation, and did not require restraints/seclusion for patient and/or provider safety.      The patient was also seen by the psychiatry consult service, extended care DEC , please refer to their extensive note/evaluation which was reviewed with me and is documented in EPIC on 11/26/2024 for further details.  The patient ultimately returned to her baseline.  Was agreeable to discharge, engaging in safety planning.  The patient has a long history of similar presentation.    Past Medical History  Past Medical History:   Diagnosis Date    ADHD (attention " deficit hyperactivity disorder)     Bipolar 1 disorder (H)     Borderline personality disorder (H)     Depressive disorder     Intellectual disability     Obesity     Syncope      Past Surgical History:   Procedure Laterality Date    APPENDECTOMY       acetaminophen (TYLENOL) 325 MG tablet  albuterol (PROAIR HFA/PROVENTIL HFA/VENTOLIN HFA) 108 (90 Base) MCG/ACT inhaler  ARIPiprazole lauroxil ER (ARISTADA) 882 MG/3.2ML intra-muscular  benztropine (COGENTIN) 1 MG tablet  cetirizine (ZYRTEC) 10 MG tablet  cloNIDine (CATAPRES) 0.1 MG tablet  clotrimazole (LOTRIMIN) 1 % external cream  dicyclomine (BENTYL) 20 MG tablet  divalproex sodium extended-release (DEPAKOTE ER) 500 MG 24 hr tablet  docusate sodium (COLACE) 50 MG capsule  FLUoxetine (PROZAC) 40 MG capsule  hydrOXYzine HCl (ATARAX) 25 MG tablet  lactase (LACTAID) 3000 UNIT tablet  levothyroxine (SYNTHROID/LEVOTHROID) 25 MCG tablet  multivitamin w/minerals (MULTI-VITAMIN) tablet  OLANZapine (ZYPREXA) 5 MG tablet  OLANZapine (ZYPREXA) 7.5 MG tablet  ondansetron (ZOFRAN) 8 MG tablet  pantoprazole (PROTONIX) 40 MG EC tablet  polyethylene glycol (MIRALAX) 17 GM/Dose powder  promethazine (PHENERGAN) 12.5 MG tablet  senna-docusate (SENOKOT-S/PERICOLACE) 8.6-50 MG tablet  sodium chloride 0.65 % nasal spray  traZODone (DESYREL) 100 MG tablet  Vitamin D, Cholecalciferol, 25 MCG (1000 UT) TABS      Allergies   Allergen Reactions    Penicillins Rash and Unknown     Family History  Family History   Problem Relation Age of Onset    Diabetes Type 1 Father     Cancer Paternal Grandfather      Social History   Social History     Tobacco Use    Smoking status: Former     Current packs/day: 0.25     Average packs/day: 0.3 packs/day for 5.0 years (1.3 ttl pk-yrs)     Types: Cigarettes, Vaping Device     Passive exposure: Past    Smokeless tobacco: Never   Substance Use Topics    Alcohol use: No    Drug use: Never      Past medical history, past surgical history, medications, allergies,  family history, and social history were reviewed with the patient. No additional pertinent items.   A complete review of systems was performed with pertinent positives and negatives noted in the HPI, and all other systems negative.    Physical Exam   BP: 127/80  Pulse: 68  Temp: 98.1  F (36.7  C)  Resp: 18  SpO2: 98 %  Physical Exam  Vitals and nursing note reviewed.   Constitutional:       General: She is not in acute distress.     Appearance: She is not diaphoretic.   HENT:      Head: Normocephalic and atraumatic.   Eyes:      Extraocular Movements: Extraocular movements intact.      Conjunctiva/sclera: Conjunctivae normal.   Cardiovascular:      Rate and Rhythm: Normal rate.      Heart sounds: Normal heart sounds.   Pulmonary:      Effort: Pulmonary effort is normal. No respiratory distress.      Breath sounds: Normal breath sounds.   Abdominal:      Palpations: Abdomen is soft.      Tenderness: There is no abdominal tenderness.   Musculoskeletal:         General: No tenderness. Normal range of motion.      Cervical back: Normal range of motion and neck supple.   Skin:     General: Skin is warm.      Findings: No rash.   Psychiatric:         Mood and Affect: Mood normal.         Speech: Speech normal.         Behavior: Behavior normal.         Thought Content: Thought content includes suicidal ideation. Thought content includes suicidal plan.           ED Course, Procedures, & Data      Procedures                Results for orders placed or performed during the hospital encounter of 11/26/24   HCG qualitative urine     Status: Normal   Result Value Ref Range    hCG Urine Qualitative Negative Negative   Urine Drug Screen Panel     Status: Normal   Result Value Ref Range    Amphetamines Urine Screen Negative Screen Negative    Barbituates Urine Screen Negative Screen Negative    Benzodiazepine Urine Screen Negative Screen Negative    Cannabinoids Urine Screen Negative Screen Negative    Cocaine Urine Screen  Negative Screen Negative    Fentanyl Qual Urine Screen Negative Screen Negative    Opiates Urine Screen Negative Screen Negative    PCP Urine Screen Negative Screen Negative   Urine Drug Screen     Status: Normal    Narrative    The following orders were created for panel order Urine Drug Screen.  Procedure                               Abnormality         Status                     ---------                               -----------         ------                     Urine Drug Screen Panel[[id number]]      Normal              Final result                 Please view results for these tests on the individual orders.     Medications   acetaminophen (TYLENOL) tablet 650 mg (has no administration in time range)   albuterol (PROVENTIL HFA/VENTOLIN HFA) inhaler (has no administration in time range)   benztropine (COGENTIN) tablet 1 mg (has no administration in time range)   cetirizine (zyrTEC) tablet 10 mg (has no administration in time range)   cloNIDine (CATAPRES) tablet 0.1 mg (0.1 mg Oral $Given 11/27/24 0111)   divalproex sodium extended-release (DEPAKOTE ER) 24 hr tablet 500 mg (500 mg Oral $Given 11/27/24 0111)   FLUoxetine (PROzac) capsule 40 mg (has no administration in time range)   hydrOXYzine HCl (ATARAX) tablet 25-50 mg (has no administration in time range)   lactase (LACTAID) tablet 3,000 Units (has no administration in time range)   levothyroxine (SYNTHROID/LEVOTHROID) tablet 25 mcg (has no administration in time range)   multivitamin w/minerals (THERA-VIT-M) tablet 1 tablet (has no administration in time range)   OLANZapine (zyPREXA) tablet 5 mg (has no administration in time range)   OLANZapine (zyPREXA) tablet 7.5 mg (7.5 mg Oral $Given 11/27/24 0111)   ondansetron (ZOFRAN) tablet 8 mg (has no administration in time range)   pantoprazole (PROTONIX) EC tablet 40 mg (has no administration in time range)   traZODone (DESYREL) tablet 100 mg (100 mg Oral $Given 11/27/24 0111)   OLANZapine zydis (zyPREXA) ODT  tab 5 mg (5 mg Oral $Given 11/26/24 1951)   OLANZapine zydis (zyPREXA) ODT tab 5 mg (5 mg Oral $Given 11/26/24 2119)     Labs Ordered and Resulted from Time of ED Arrival to Time of ED Departure   HCG QUALITATIVE URINE - Normal       Result Value    hCG Urine Qualitative Negative     URINE DRUG SCREEN PANEL - Normal    Amphetamines Urine Screen Negative      Barbituates Urine Screen Negative      Benzodiazepine Urine Screen Negative      Cannabinoids Urine Screen Negative      Cocaine Urine Screen Negative      Fentanyl Qual Urine Screen Negative      Opiates Urine Screen Negative      PCP Urine Screen Negative       No orders to display      -----  Observation Addendum  With this Addendum, this ED Provider Note may also serve as an Observation H&P    Observation Initiation Date: Nov 26, 2024    Patient presenting with suicidal ideation.    A DEC assessment was completed, and the case was discussed with the . The  recommended observation. See separate DEC note from today's date for details on the assessment.    During the initial care period, the patient did require medications for agitation, and did not require restraints/seclusion for patient and/or provider safety.     The patient's outpatient medications were reconciled and ordered.     The patient was found to have a psychiatric condition that would benefit from an observation stay in the emergency department for further psychiatric stabilization and/or coordination of a safe disposition. The observation plan includes serial assessments of psychiatric condition, potential administration of medications if indicated, further disposition pending the patient's psychiatric course during the monitoring period.   -----     Critical care was not performed.     Medical Decision Making  The patient's presentation was of moderate complexity (a chronic illness mild to moderate exacerbation, progression, or side effect of treatment).    The patient's  evaluation involved:  review of external note(s) from 1 sources (see separate area of note for details)  review of 3+ test result(s) ordered prior to this encounter (previous CBC, metabolic panel, Depakote level from yesterday)  ordering and/or review of 2 test(s) in this encounter (see separate area of note for details)  discussion of management or test interpretation with another health professional (DEC )    The patient's management necessitated high risk (a decision regarding hospitalization).    Assessment & Plan    24 year old female with borderline personality disorder, bipolar disorder, suicidal ideation to the emergency department complaining of suicidal ideations with plan to scratch herself.  She continues to endorse suicidal ideation here in the emergency department.  She underwent DEC assessment.  Plan for observation with hopeful for discharge to home in the morning.  Patient denies any medical concerns.  She appears medically stable for observation admission.  Home medications ordered and administered.    I have reviewed the nursing notes. I have reviewed the findings, diagnosis, plan and need for follow up with the patient.    New Prescriptions    No medications on file       Final diagnoses:   Suicidal ideation   Borderline personality disorder (H)     Chart documentation was completed with Dragon voice-recognition software. Even though reviewed, this chart may still contain some grammatical, spelling, and word errors.     Ambrocio Ferro Md    [facility] [place] EMERGENCY DEPARTMENT  11/26/2024     Ambrocio Ferro MD  11/27/24 0118       Ambrocio Ferro MD  11/27/24 0120

## 2024-11-27 NOTE — ED NOTES
"Pt is upset about not having a room. Pt informed that there are no rooms available to which she said \"I don't care what you guys do I'm going to kill myself.\" When writer asked pt why she was feeling that way she stated \"I'm not even important enough.\"  "

## 2024-11-27 NOTE — ED NOTES
Bed: UREDH-N  Expected date:   Expected time:   Means of arrival:   Comments:  Isai 929   24F  SI  voluntary
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
## 2024-11-27 NOTE — ED PROVIDER NOTES
ED Observation Progress Note  [facility]  Note Date: 11/27/2024    Sadaf Ross MRN: [id number]   Age: 24 year old YOB: 2000     Interval History   Continues to improve.  Vital signs generally better.  Eating and voiding well.  Tolerating medications without significant side effects.  No new concerns today.  Patient has a history of borderline personality disorder, bipolar disorder, ADHD, PTSD, and frequent ED presentation for both somatoform and psychiatric complaints.    Physical Exam   /69   Pulse 61   Temp 97.8  F (36.6  C) (Oral)   Resp 16   LMP  (LMP Unknown)   SpO2 98%   Physical Exam  General:   . Appears stated age.   HENT: MMM, no oropharyngeal lesions  Eyes: PERRL, normal sclerae   Cardio: Regular   rate, extremities well perfused  Resp: Normal work of breathing, normal respiratory rate  Neuro: alert and fully   oriented. CN II-XII grossly intact. Grossly normal strength and sensation in all extremities.   MSK: no deformities. Grossly normal ROM.  Integumentary/Skin: no rash visualized, normal color  Psych: Calm affect, cooperative behavior.  No active SI.  Denies HI.  Denies hallucinations. Thought process logical. Insight fair.     Results              Assessments & Plan (with Medical Decision Making)   Sadaf Ross is a 24 year old female admitted to ED Observation status with borderline personality disorder, chronic self-injurious and suicidal thoughts, borderline personality disorder, and ED frequently within the last 7 to 10 days and again complaining of suicidal ideation.     On this date, the patient did not require medications for agitation, and did not require restraints/seclusion for patient and/or provider safety.     Notable events and plan updates today: The patient tells me she is starting to feel better.  She is currently living with her mother and sister, they are not home all day today and well into the night due to work  obligations.  She feels if she had support at home should be safe to go home, but is anxious about doing so as she believes she may harm herself if discharged home with no in person support.  She feels she could likely go home when her mother or sister are available to provide that.  I spoke with her mother who agrees.  Mother states due to her job she cannot get home till well after midnight tonight.  She will be will agree to take her medications and if she continues to improve can be discharged with her mother in the morning.    The patient's condition is such that further monitoring for psychiatric stabilization and/or coordination of a safe disposition is still indicated. The observation plan includes serial assessments of psychiatric condition, potential administration of medications if indicated, further disposition pending the patient's psychiatric course during the monitoring period.     --  Leo Lowe MD  [facility] EMERGENCY DEPARTMENT  11/27/2024        Leo Lowe MD  11/27/24 1122

## 2024-11-27 NOTE — PROGRESS NOTES
Sadaf Ross  November 26, 2024  Plan of Care Hand-off Note     Patient Care Path: observation    Plan for Care:   It is the recommendation of this writer that pt remain on observation status in the ED with the goal of decreasing the intensity of her suicidal ideation prior to discharge. She is currently reporting suicidal plans to break open her skull as well as bite, cut, or freeze herself. She is unable to engage in safety planning or contract for safety outside of a hospital setting at this point in time. She called her mother while in the ED and said that she would kill herself if she left the hospital. Inpatient hospitalization is unlikely to mitigate pt's mental health concerns. She has a long-standing history of presenting to the ED for suicidal ideation and was recently admitted to inpatient for 18 days with discharge on 11/22/24. Pt is her own legal guardian and able to discharge to her mother's house. However, pt's mother is reporting that she no longer wants the pt living with her as there is a young child in the home who is vulnerable to pt's volatile behaviors. Pt likely needs another group home placement with 24/7 supervision. This writer anticipates that pt will discharge in the morning when the intensity of her emotions has decreased based on pt's past patterns of behavior. Pt does appear to be functioning largely at baseline. Pt's AVS is completed if her suicidal ideation decreases and she would like to discharge overnight.    Identified Goals and Safety Issues: decrease suicidal ideation    Overview:  Yarely Ross, mother, PH: [phone]      Legal Status at Admission: Voluntary/Patient has signed consent for treatment    Psychiatry Consult: No psychiatry consult needed. Pt had a psychiatry consult completed in the ED yesterday (11/25/24).     Updated MD and RN regarding plan of care.      Bonita Sandoval [facility]

## 2024-11-27 NOTE — DISCHARGE INSTRUCTIONS
Health Care Follow-up (copied and pasted from your inpatient psychiatry discharge summary from 11/22/24):     [facility] Intake appointment: Thursday, December 12, 2024 @ 11:30am via Phone  Provider: [facility]  Address: [address], [place] [address]  Phone: [phone] Fax: [phone]  Notes: At the time of your appointment, you will receive a phone call @ [phone]. All subsequent appointments will be in-person at your residence. If you prefer a Telehealth/video visit for this appointment, please call the clinic with an updated email address.      Appointment Date/Time: Thursday, December 19th @ 8am  Psychiatrist/Primary Care Giver: LINN Tong CNP   Address: [facility]   Phone Number: [phone]     Appointment Date/Time: Wednesday December 18th @ 9am    Therapist: Yonathan Pereira    Address: [facility]   Phone Number: [phone]           [facility]:  You were referred to [facility], Tayo ([phone]) received your referral and will follow up with you to schedule a tour.         [facility] offer a supportive community environment to meet people and get peer support, participate in recreational activities, and engage in volunteer work. They are located in [facility], and [place]. To learn more, go to [email or web address] or call or email their  at [phone] and [email or web address] to schedule a tour.     Resources:   Mental Health Crisis Resources  Throughout [place]: call **CRISIS (**850176)  Crisis Text Line: is available for free, 24/7 by texting [place] to 110352  Suicide Awareness Voices of Education (SAVE) (www.save.org): 096-518-REWH (7816)  The National Suicide Prevention Lifeline is now: 988 Suicide and Crisis Lifeline. Call 988 anytime.  National [place] on Mental Illness (www.[place].celine.org): 498.319.4470 or 446-285-1727.  [employer]: text  the word LIFE to 68133 for immediate support and crisis intervention  Mental Health Consumer/Survivor Network of [place] (www.mhcsn.net): 156.844.9053 or 639-485-1750  Mental Health Association of [place] (www.mentalhealth.org): 155.320.8245 or 766-187-2334  Peer Support Connection [place] Warmline ([facility]) 1-884.820.4288 Available from 5pm - 9am (7 days a week/365 days a year)  Call or Text 988 or [facility] 1-598.270.4746 [facility] Mental Crisis Program

## 2024-11-28 VITALS
DIASTOLIC BLOOD PRESSURE: 80 MMHG | TEMPERATURE: 97.4 F | RESPIRATION RATE: 16 BRPM | HEART RATE: 68 BPM | OXYGEN SATURATION: 100 % | SYSTOLIC BLOOD PRESSURE: 135 MMHG

## 2024-11-28 PROCEDURE — 250N000013 HC RX MED GY IP 250 OP 250 PS 637: Performed by: EMERGENCY MEDICINE

## 2024-11-28 PROCEDURE — G0378 HOSPITAL OBSERVATION PER HR: HCPCS

## 2024-11-28 RX ADMIN — PANTOPRAZOLE SODIUM 40 MG: 40 TABLET, DELAYED RELEASE ORAL at 08:41

## 2024-11-28 RX ADMIN — Medication 1 TABLET: at 08:41

## 2024-11-28 RX ADMIN — BENZTROPINE MESYLATE 1 MG: 1 TABLET ORAL at 08:41

## 2024-11-28 RX ADMIN — LEVOTHYROXINE SODIUM 25 MCG: 25 TABLET ORAL at 08:41

## 2024-11-28 RX ADMIN — FLUOXETINE HYDROCHLORIDE 40 MG: 20 CAPSULE ORAL at 08:41

## 2024-11-28 RX ADMIN — CETIRIZINE HYDROCHLORIDE 10 MG: 10 TABLET, FILM COATED ORAL at 08:41

## 2024-11-28 ASSESSMENT — ACTIVITIES OF DAILY LIVING (ADL)
ADLS_ACUITY_SCORE: 55

## 2024-11-28 NOTE — ED NOTES
"Pt earlier asked to be discharged \" My mother will be off at work by midnight. I would like to go home and  I will be on the streets while waiting for my mother to get home\" Message was relayed to MD.    Pt has been staying in her room this shift either watching tv or on her phone. Pt refused to take meds but later recanted. Pt took all of her scheduled meds and PRN Hydroxyzine. Pt did allow VS to be done. Offered patient personal hygiene items.  Pt declined.    Per pt her \" sister will pick me tomorrow when she wakes up and will drive me home.\"    No SIB behaviors noted this shift.    Pt continues to have a 1:1 attendant at BS     "

## 2024-11-28 NOTE — ED NOTES
ED Observation Discharge Summary  [facility]  Discharge Date: 11/28/2024    Sadaf Ross MRN: [id number]   Age: 24 year old YOB: 2000     Brief HPI & Initial ED Course     Chief Complaint   Patient presents with    Suicidal     HPI  Sadaf Ross is a 24 year old female with PMH notable for bipolar disorder, borderline personality, ADHD, PTSD, frequent ER visits who presented to the ED with a psychiatric concern.  Patient had reported suicidal ideation and a plan to scratch herself.      The patient was evaluated in the emergency department by a physician and DEC behavioral health . The DEC  recommended observation in the emergency department overnight. See separate DEC note from this encounter for details on the assessment. The patient's psychiatric state was such that she would benefit from ongoing monitoring. Observation care was initiated with the plan including serial assessments of psychiatric condition, potential administration of medications if indicated, and further disposition pending the patient's psychiatric course during the monitoring period.     See ED Observation H&P for further details on the patient's presenting history and initial evaluation.     Physical Exam   BP: 127/80  Pulse: 68  Temp: 98.1  F (36.7  C)  Resp: 18  SpO2: 98 %    Physical Exam      Results      Procedures                  Labs Ordered and Resulted from Time of ED Arrival to Time of ED Departure   HCG QUALITATIVE URINE - Normal       Result Value    hCG Urine Qualitative Negative     URINE DRUG SCREEN PANEL - Normal    Amphetamines Urine Screen Negative      Barbituates Urine Screen Negative      Benzodiazepine Urine Screen Negative      Cannabinoids Urine Screen Negative      Cocaine Urine Screen Negative      Fentanyl Qual Urine Screen Negative      Opiates Urine Screen Negative      PCP Urine Screen Negative     GLUCOSE BY METER - Normal    GLUCOSE BY METER  POCT 99              Observation Course   The patient was found to have a psychiatric condition that would benefit from an observation stay in the emergency department for further psychiatric stabilization and/or coordination of a safe disposition. The plan upon observation admission included serial assessments of psychiatric condition, potential administration of medications if indicated, further disposition pending the patient's psychiatric course during the monitoring period.     Serial assessments of the patient's psychiatric condition were performed. Nursing notes were reviewed. During the observation period, the patient did not require medications for agitation, and did not require restraints/seclusion for patient and/or provider safety.        After the period in observation care, the patient's circumstances and mental state were safe for outpatient management. After counseling on the diagnosis, work-up, and treatment plan, the patient was discharged. Close follow-up with her care team and community psychiatric resources were provided. Patient is to return to the ED if any urgent or potentially life-threatening concerns.      Discharge Diagnoses:   Final diagnoses:   Suicidal ideation   Borderline personality disorder (H)       --  Tori Whalen MD  [facility] EMERGENCY DEPARTMENT  11/28/2024      Tori Whalen MD  11/28/24 1049

## 2024-11-28 NOTE — ED NOTES
"Pt continues to be A/O x 3 pleasant but uncooperative.  Pt demanded to see writer during shift change. When writer went to see pt, pt was laying on the floor stating \" I feel shaky\". Pt had a blanket in the room but refusing to use it. Pt refused any assessment by this writer even VS checked. Pt was not in respiratory distress, did not verbalized any CP.   Pt continues to have 1:1 attendant.  "

## 2024-11-28 NOTE — ED NOTES
Writer took over care at 2305. Pt has been sleeping; rr wnl, even, and unlabored. 1:1 remains at bedside. No events of note.

## 2024-12-03 ENCOUNTER — HOSPITAL ENCOUNTER (OUTPATIENT)
Facility: CLINIC | Age: 24
Setting detail: OBSERVATION
Discharge: HOME | End: 2024-12-04
Attending: EMERGENCY MEDICINE | Admitting: EMERGENCY MEDICINE
Payer: MEDICARE

## 2024-12-03 DIAGNOSIS — R45.851 SUICIDE IDEATION: ICD-10-CM

## 2024-12-03 DIAGNOSIS — F60.3 BORDERLINE PERSONALITY DISORDER (H): ICD-10-CM

## 2024-12-03 PROCEDURE — 250N000013 HC RX MED GY IP 250 OP 250 PS 637: Performed by: EMERGENCY MEDICINE

## 2024-12-03 PROCEDURE — 99285 EMERGENCY DEPT VISIT HI MDM: CPT | Performed by: EMERGENCY MEDICINE

## 2024-12-03 PROCEDURE — 80307 DRUG TEST PRSMV CHEM ANLYZR: CPT | Performed by: EMERGENCY MEDICINE

## 2024-12-03 PROCEDURE — 81025 URINE PREGNANCY TEST: CPT | Performed by: EMERGENCY MEDICINE

## 2024-12-03 PROCEDURE — 99223 1ST HOSP IP/OBS HIGH 75: CPT | Performed by: EMERGENCY MEDICINE

## 2024-12-03 RX ORDER — CHOLECALCIFEROL (VITAMIN D3) 125 MCG
3000 CAPSULE ORAL 3 TIMES DAILY PRN
Status: DISCONTINUED | OUTPATIENT
Start: 2024-12-03 | End: 2024-12-04 | Stop reason: HOSPADM

## 2024-12-03 RX ORDER — OLANZAPINE 5 MG/1
5 TABLET ORAL DAILY PRN
Status: DISCONTINUED | OUTPATIENT
Start: 2024-12-03 | End: 2024-12-04 | Stop reason: HOSPADM

## 2024-12-03 RX ORDER — CETIRIZINE HYDROCHLORIDE 10 MG/1
10 TABLET ORAL DAILY
Status: DISCONTINUED | OUTPATIENT
Start: 2024-12-04 | End: 2024-12-04 | Stop reason: HOSPADM

## 2024-12-03 RX ORDER — DIVALPROEX SODIUM 500 MG/1
500 TABLET, FILM COATED, EXTENDED RELEASE ORAL AT BEDTIME
Status: DISCONTINUED | OUTPATIENT
Start: 2024-12-03 | End: 2024-12-04 | Stop reason: HOSPADM

## 2024-12-03 RX ORDER — CLONIDINE HYDROCHLORIDE 0.1 MG/1
0.1 TABLET ORAL AT BEDTIME
Status: DISCONTINUED | OUTPATIENT
Start: 2024-12-03 | End: 2024-12-04 | Stop reason: HOSPADM

## 2024-12-03 RX ORDER — TRAZODONE HYDROCHLORIDE 50 MG/1
100 TABLET, FILM COATED ORAL AT BEDTIME
Status: DISCONTINUED | OUTPATIENT
Start: 2024-12-03 | End: 2024-12-04 | Stop reason: HOSPADM

## 2024-12-03 RX ORDER — HYDROXYZINE HYDROCHLORIDE 25 MG/1
25-50 TABLET, FILM COATED ORAL EVERY 4 HOURS PRN
Status: DISCONTINUED | OUTPATIENT
Start: 2024-12-03 | End: 2024-12-04 | Stop reason: HOSPADM

## 2024-12-03 RX ORDER — PANTOPRAZOLE SODIUM 40 MG/1
40 TABLET, DELAYED RELEASE ORAL DAILY
Status: DISCONTINUED | OUTPATIENT
Start: 2024-12-04 | End: 2024-12-04 | Stop reason: HOSPADM

## 2024-12-03 RX ORDER — LEVOTHYROXINE SODIUM 25 UG/1
25 TABLET ORAL DAILY
Status: DISCONTINUED | OUTPATIENT
Start: 2024-12-04 | End: 2024-12-04 | Stop reason: HOSPADM

## 2024-12-03 RX ORDER — BENZTROPINE MESYLATE 1 MG/1
1 TABLET ORAL 2 TIMES DAILY
Status: DISCONTINUED | OUTPATIENT
Start: 2024-12-03 | End: 2024-12-04 | Stop reason: HOSPADM

## 2024-12-03 RX ORDER — ONDANSETRON 4 MG/1
8 TABLET, ORALLY DISINTEGRATING ORAL EVERY 8 HOURS PRN
Status: DISCONTINUED | OUTPATIENT
Start: 2024-12-03 | End: 2024-12-04 | Stop reason: HOSPADM

## 2024-12-03 RX ORDER — OLANZAPINE 2.5 MG/1
7.5 TABLET, FILM COATED ORAL AT BEDTIME
Status: DISCONTINUED | OUTPATIENT
Start: 2024-12-03 | End: 2024-12-04 | Stop reason: HOSPADM

## 2024-12-03 RX ADMIN — DIVALPROEX SODIUM 500 MG: 500 TABLET, FILM COATED, EXTENDED RELEASE ORAL at 23:23

## 2024-12-03 RX ADMIN — TRAZODONE HYDROCHLORIDE 100 MG: 50 TABLET ORAL at 23:23

## 2024-12-03 RX ADMIN — BENZTROPINE MESYLATE 1 MG: 1 TABLET ORAL at 23:23

## 2024-12-03 RX ADMIN — CLONIDINE HYDROCHLORIDE 0.1 MG: 0.1 TABLET ORAL at 23:23

## 2024-12-03 RX ADMIN — OLANZAPINE 7.5 MG: 2.5 TABLET, FILM COATED ORAL at 23:23

## 2024-12-03 ASSESSMENT — COLUMBIA-SUICIDE SEVERITY RATING SCALE - C-SSRS
5. HAVE YOU STARTED TO WORK OUT OR WORKED OUT THE DETAILS OF HOW TO KILL YOURSELF? DO YOU INTEND TO CARRY OUT THIS PLAN?: NO
2. HAVE YOU ACTUALLY HAD ANY THOUGHTS OF KILLING YOURSELF IN THE PAST MONTH?: YES
1. IN THE PAST MONTH, HAVE YOU WISHED YOU WERE DEAD OR WISHED YOU COULD GO TO SLEEP AND NOT WAKE UP?: YES
3. HAVE YOU BEEN THINKING ABOUT HOW YOU MIGHT KILL YOURSELF?: YES
6. HAVE YOU EVER DONE ANYTHING, STARTED TO DO ANYTHING, OR PREPARED TO DO ANYTHING TO END YOUR LIFE?: YES
4. HAVE YOU HAD THESE THOUGHTS AND HAD SOME INTENTION OF ACTING ON THEM?: YES

## 2024-12-03 ASSESSMENT — ACTIVITIES OF DAILY LIVING (ADL): ADLS_ACUITY_SCORE: 55

## 2024-12-04 ENCOUNTER — HOSPITAL ENCOUNTER (EMERGENCY)
Facility: CLINIC | Age: 24
Discharge: HOME | End: 2024-12-05
Attending: EMERGENCY MEDICINE
Payer: MEDICARE

## 2024-12-04 ENCOUNTER — TELEPHONE (OUTPATIENT)
Dept: BEHAVIORAL HEALTH | Facility: CLINIC | Age: 24
End: 2024-12-04
Payer: MEDICARE

## 2024-12-04 VITALS
DIASTOLIC BLOOD PRESSURE: 69 MMHG | SYSTOLIC BLOOD PRESSURE: 103 MMHG | HEART RATE: 69 BPM | BODY MASS INDEX: 41.5 KG/M2 | OXYGEN SATURATION: 98 % | TEMPERATURE: 98.6 F | RESPIRATION RATE: 18 BRPM | HEIGHT: 64 IN

## 2024-12-04 DIAGNOSIS — R45.851 SUICIDAL IDEATION: ICD-10-CM

## 2024-12-04 DIAGNOSIS — R45.850 HOMICIDAL IDEATION: ICD-10-CM

## 2024-12-04 PROBLEM — F32.A DEPRESSION WITH SUICIDAL IDEATION: Status: RESOLVED | Noted: 2024-12-04 | Resolved: 2024-12-04

## 2024-12-04 PROBLEM — F41.1 GAD (GENERALIZED ANXIETY DISORDER): Status: ACTIVE | Noted: 2024-12-04

## 2024-12-04 PROBLEM — F32.A DEPRESSION WITH SUICIDAL IDEATION: Status: ACTIVE | Noted: 2024-12-04

## 2024-12-04 LAB — SARS-COV-2 RNA RESP QL NAA+PROBE: NEGATIVE

## 2024-12-04 PROCEDURE — 99207 PR NO CHARGE LOS: CPT | Performed by: NURSE PRACTITIONER

## 2024-12-04 PROCEDURE — 250N000013 HC RX MED GY IP 250 OP 250 PS 637: Performed by: EMERGENCY MEDICINE

## 2024-12-04 PROCEDURE — 87635 SARS-COV-2 COVID-19 AMP PRB: CPT | Performed by: EMERGENCY MEDICINE

## 2024-12-04 PROCEDURE — 99285 EMERGENCY DEPT VISIT HI MDM: CPT | Performed by: EMERGENCY MEDICINE

## 2024-12-04 PROCEDURE — G0378 HOSPITAL OBSERVATION PER HR: HCPCS

## 2024-12-04 RX ORDER — ACETAMINOPHEN 325 MG/1
650 TABLET ORAL EVERY 6 HOURS PRN
Status: DISCONTINUED | OUTPATIENT
Start: 2024-12-04 | End: 2024-12-05 | Stop reason: HOSPADM

## 2024-12-04 RX ORDER — CETIRIZINE HYDROCHLORIDE 10 MG/1
10 TABLET ORAL DAILY
Status: DISCONTINUED | OUTPATIENT
Start: 2024-12-05 | End: 2024-12-05 | Stop reason: HOSPADM

## 2024-12-04 RX ORDER — ONDANSETRON 8 MG/1
8 TABLET, FILM COATED ORAL EVERY 8 HOURS PRN
Status: DISCONTINUED | OUTPATIENT
Start: 2024-12-04 | End: 2024-12-05 | Stop reason: HOSPADM

## 2024-12-04 RX ORDER — CHOLECALCIFEROL (VITAMIN D3) 125 MCG
3000 CAPSULE ORAL 3 TIMES DAILY PRN
Status: DISCONTINUED | OUTPATIENT
Start: 2024-12-04 | End: 2024-12-05 | Stop reason: HOSPADM

## 2024-12-04 RX ORDER — AMOXICILLIN 250 MG
1 CAPSULE ORAL 2 TIMES DAILY PRN
Status: DISCONTINUED | OUTPATIENT
Start: 2024-12-04 | End: 2024-12-05 | Stop reason: HOSPADM

## 2024-12-04 RX ORDER — DICYCLOMINE HCL 20 MG
20 TABLET ORAL 2 TIMES DAILY
Status: DISCONTINUED | OUTPATIENT
Start: 2024-12-04 | End: 2024-12-05 | Stop reason: HOSPADM

## 2024-12-04 RX ORDER — DOCUSATE SODIUM 100 MG/1
100 CAPSULE, LIQUID FILLED ORAL 2 TIMES DAILY
Status: DISCONTINUED | OUTPATIENT
Start: 2024-12-04 | End: 2024-12-05 | Stop reason: HOSPADM

## 2024-12-04 RX ORDER — DIVALPROEX SODIUM 500 MG/1
500 TABLET, FILM COATED, EXTENDED RELEASE ORAL AT BEDTIME
Status: DISCONTINUED | OUTPATIENT
Start: 2024-12-04 | End: 2024-12-05 | Stop reason: HOSPADM

## 2024-12-04 RX ORDER — TRAZODONE HYDROCHLORIDE 50 MG/1
100 TABLET, FILM COATED ORAL AT BEDTIME
Status: DISCONTINUED | OUTPATIENT
Start: 2024-12-04 | End: 2024-12-05 | Stop reason: HOSPADM

## 2024-12-04 RX ORDER — HYDROXYZINE HYDROCHLORIDE 25 MG/1
25 TABLET, FILM COATED ORAL
Status: DISCONTINUED | OUTPATIENT
Start: 2024-12-04 | End: 2024-12-05 | Stop reason: HOSPADM

## 2024-12-04 RX ORDER — CLONIDINE HYDROCHLORIDE 0.1 MG/1
0.1 TABLET ORAL AT BEDTIME
Status: DISCONTINUED | OUTPATIENT
Start: 2024-12-04 | End: 2024-12-05 | Stop reason: HOSPADM

## 2024-12-04 RX ORDER — LEVOTHYROXINE SODIUM 25 UG/1
25 TABLET ORAL DAILY
Status: DISCONTINUED | OUTPATIENT
Start: 2024-12-05 | End: 2024-12-05 | Stop reason: HOSPADM

## 2024-12-04 RX ORDER — VITAMIN B COMPLEX
25 TABLET ORAL DAILY
Status: DISCONTINUED | OUTPATIENT
Start: 2024-12-04 | End: 2024-12-05 | Stop reason: HOSPADM

## 2024-12-04 RX ORDER — BENZTROPINE MESYLATE 1 MG/1
1 TABLET ORAL 2 TIMES DAILY
Status: DISCONTINUED | OUTPATIENT
Start: 2024-12-04 | End: 2024-12-05 | Stop reason: HOSPADM

## 2024-12-04 RX ADMIN — CETIRIZINE HYDROCHLORIDE 10 MG: 10 TABLET, FILM COATED ORAL at 09:27

## 2024-12-04 RX ADMIN — PANTOPRAZOLE SODIUM 40 MG: 40 TABLET, DELAYED RELEASE ORAL at 09:27

## 2024-12-04 RX ADMIN — OLANZAPINE 7.5 MG: 5 TABLET, FILM COATED ORAL at 21:15

## 2024-12-04 RX ADMIN — BENZTROPINE MESYLATE 1 MG: 1 TABLET ORAL at 09:27

## 2024-12-04 RX ADMIN — LEVOTHYROXINE SODIUM 25 MCG: 0.03 TABLET ORAL at 09:26

## 2024-12-04 RX ADMIN — TRAZODONE HYDROCHLORIDE 100 MG: 50 TABLET ORAL at 21:12

## 2024-12-04 RX ADMIN — BENZTROPINE MESYLATE 1 MG: 1 TABLET ORAL at 21:14

## 2024-12-04 RX ADMIN — DIVALPROEX SODIUM 500 MG: 500 TABLET, FILM COATED, EXTENDED RELEASE ORAL at 21:13

## 2024-12-04 RX ADMIN — DOCUSATE SODIUM 100 MG: 100 CAPSULE, LIQUID FILLED ORAL at 21:14

## 2024-12-04 RX ADMIN — DICYCLOMINE HYDROCHLORIDE 20 MG: 20 TABLET ORAL at 21:15

## 2024-12-04 RX ADMIN — FLUOXETINE HYDROCHLORIDE 40 MG: 20 CAPSULE ORAL at 09:27

## 2024-12-04 RX ADMIN — Medication 25 MCG: at 21:14

## 2024-12-04 RX ADMIN — CLONIDINE HYDROCHLORIDE 0.1 MG: 0.1 TABLET ORAL at 21:15

## 2024-12-04 RX ADMIN — ACETAMINOPHEN 650 MG: 325 TABLET, FILM COATED ORAL at 21:13

## 2024-12-04 ASSESSMENT — ACTIVITIES OF DAILY LIVING (ADL)
ADLS_ACUITY_SCORE: 55

## 2024-12-04 ASSESSMENT — COLUMBIA-SUICIDE SEVERITY RATING SCALE - C-SSRS
1. SINCE LAST CONTACT, HAVE YOU WISHED YOU WERE DEAD OR WISHED YOU COULD GO TO SLEEP AND NOT WAKE UP?: NO
2. HAVE YOU ACTUALLY HAD ANY THOUGHTS OF KILLING YOURSELF?: NO
ATTEMPT SINCE LAST CONTACT: NO
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
## 2024-12-04 NOTE — PHARMACY-ADMISSION MEDICATION HISTORY
Admission medication history completed at [facility]. Chart was reviewed for any potential medication changes since that time. No discrepancies were found. Please see Pharmacist Admission Medication History note from 11/25/2024.     So MonroeD
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
## 2024-12-04 NOTE — CONSULTS
Diagnostic Evaluation Consultation  Crisis Assessment    Patient Name: Sadaf Ross  Age:  24 year old  Legal Sex: female  Gender Identity: female  Pronouns:   Race: White  Ethnicity: Not  or   Language: English      Patient was assessed: In person   Crisis Assessment Start Date: 12/04/24  Crisis Assessment Start Time: 0110  Crisis Assessment Stop Time: 0126  Patient location: [facility] EMERGENCY DEPARTMENT                             [facility]E    Referral Data and Chief Complaint  Sadaf Ross presents to the ED via EMS. Patient is presenting to the ED for the following concerns: Suicidal ideation, Worsening psychosocial stress.   Factors that make the mental health crisis life threatening or complex are:  Pt presented to the ER with SI and worsening psychosocial stress. Pt had an argument with her sister and sister's boyfriend, who pt stated yelled at her, telling her to take her meds. Pt stated she didn't want to take her medications because she felt they were not effective. Pt stated it got escalated with them telling her to leave or that they would call the . Pt appeared to feel insulted and called for an ambulance because she began to feel suicidal. Pt initially denied plans/intent during the interview but later stated living in that home with family makes her want to slit her throat with a knife. Pt received her evening medications in the ER and had fallen asleep, needing prompting to participate in the interview. Pt requested [facility] placement and expressed an openness to meeting with a psychiatrist about her medications after some rest. Pt endorsed vaping but stated she can go without NRT while in the hospital. Pt endorsed self-harm by scratching her arm 3-4 days ago, an aborted suicide attempt to stab herself with a knife 3 days ago, increased depressive sx and irritability, along with poor sleep and appetite. Pt denied HI, hallucinations, and substance  "use.      Informed Consent and Assessment Methods  Explained the crisis assessment process, including applicable information disclosures and limits to confidentiality, assessed understanding of the process, and obtained consent to proceed with the assessment.  Assessment methods included conducting a formal interview with patient, review of medical records, collaboration with medical staff, and obtaining relevant collateral information from family and community providers when available.  : done     Patient response to interventions: acceptance expressed, verbalizes understanding  Coping skills were attempted to reduce the crisis:  Help-seeking     History of the Crisis   Per pt, she endorsed past dx of depression, anxiety, bipolar, and PTSD. Per chart, pt also appears to have past dx of BPD, ADHD, and ID. Pt reported having a medication (which she couldn't recall the name of) added on during her last [facility] visit in November, but stated she hasn't noticed any benefit from it yet. Pt stated she tries \"listening to music and every coping skill but they don't work.\" Pt stated she feels safe at home but cannot handle feeling disrespected. Pt denied knowledge of MH dx in the family. Pt endorsed meeting with a therapist c. 2x/week, having a , and having a new psychiatrist but not yet meeting with them.    Brief Psychosocial History  Family:  Single, Children no  Support System:  Sibling(s), Parent(s), Other (specify) (Care providers)  Employment Status:  disabled  Source of Income:  disability  Financial Environmental Concerns:  none  Current Hobbies:  music  Barriers in Personal Life:  mental health concerns    Significant Clinical History  Current Anxiety Symptoms:     Current Depression/Trauma:  thoughts of death/suicide, negativistic, irritable, impaired decision making  Current Somatic Symptoms:     Current Psychosis/Thought Disturbance:  impulsive, hostile/aggressive, agitation  Current Eating Symptoms:  " loss of appetite  Chemical Use History:  Alcohol: None  Benzodiazepines: None  Opiates: None  Cocaine: None  Marijuana: None  Other Use: Other (comments) (Vaping tobacco products)   Past diagnosis:  ADHD, Anxiety Disorder, Bipolar Disorder, Depression, Personality Disorder, Suicide attempt(s), PTSD  Family history:  No known history of mental health or chemical health concerns  Past treatment:  Individual therapy, Inpatient Hospitalization, Primary Care, Psychiatric Medication Management, Case management, Supportive Living Environment (group home, long-term house, etc)  Details of most recent treatment:     Other relevant history:          Collateral Information  Is there collateral information: No (Pt declined for collateral to be collected.)     Risk Assessment  [place] Suicide Severity Rating Scale Full Clinical Version:  Suicidal Ideation  Q1 Wish to be Dead (Lifetime): Yes  Q2 Non-Specific Active Suicidal Thoughts (Lifetime): Yes  3. Active Suicidal Ideation with any Methods (Not Plan) Without Intent to Act (Lifetime): Yes  Q4 Active Suicidal Ideation with Some Intent to Act, Without Specific Plan (Lifetime): Yes  Q5 Active Suicidal Ideation with Specific Plan and Intent (Lifetime): Yes  Q6 Suicide Behavior (Lifetime): yes  Intensity of Ideation (Lifetime)  Description of Most Severe Ideation (Lifetime): Thoughts of slitting her throat with a knife  Suicidal Behavior (Lifetime)  Actual Attempt (Lifetime): Yes  Actual Attempt Description (Lifetime): Pt did not provide a number of attempts  Has subject engaged in non-suicidal self-injurious behavior? (Lifetime): Yes  Interrupted Attempts (Lifetime): No  Aborted or Self-Interrupted Attempt (Lifetime): Yes  Total Number of Aborted or Self-Interrupted Attempts (Lifetime): 1  Aborted or Self-Interrupted Attempt Description (Lifetime): Aborted plans to stab self with a knife  Preparatory Acts or Behavior (Lifetime): Yes  Preparatory Acts or Behavior Description  (Lifetime): Pt did not provide details    [place] Suicide Severity Rating Scale Recent:   Suicidal Ideation (Recent)  Q1 Wished to be Dead (Past Month): yes  Q2 Suicidal Thoughts (Past Month): yes  Q3 Suicidal Thought Method: yes  Q4 Suicidal Intent without Specific Plan: yes  Q5 Suicide Intent with Specific Plan: yes  If yes to Q6, within past 3 months?: yes  Level of Risk per Screen: high risk     Suicidal Behavior (Recent)  Actual Attempt (Past 3 Months): Yes  Total Number of Actual Attempts (Past 3 Months): 1  Actual Attempt Description (Past 3 Months): Thoughts of stabbing self with knife  Has subject engaged in non-suicidal self-injurious behavior? (Past 3 Months): Yes  Interrupted Attempts (Past 3 Months): No  Aborted or Self-Interrupted Attempt (Past 3 Months): Yes  Total Number of Aborted or Self-Interrupted Attempts (Past 3 Months): 1  Aborted or Self-Interrupted Attempt Description (Past 3 Months): Pt reported aborting plan to stab self with a knife    Environmental or Psychosocial Events: challenging interpersonal relationships, neither working nor attending school, bullied/abused  Protective Factors: Protective Factors: supportive ongoing medical and mental health care relationships, help seeking, able to access care without barriers, reality testing ability    Does the patient have thoughts of harming others? Feels Like Hurting Others: no  Previous Attempt to Hurt Others: no    Is the patient engaging in sexually inappropriate behavior?           Mental Status Exam   Affect: Blunted  Appearance: Disheveled  Attention Span/Concentration: Attentive  Eye Contact: Engaged    Fund of Knowledge: Appropriate   Language /Speech Content: Fluent  Language /Speech Volume: Normal  Language /Speech Rate/Productions: Minimally Responsive  Recent Memory: Intact  Remote Memory: Intact  Mood: Depressed, Irritable  Orientation to Person: Yes   Orientation to Place: Yes  Orientation to Time of Day: Yes  Orientation to  Date: Yes     Situation (Do they understand why they are here?): Yes  Psychomotor Behavior: Normal  Thought Content: Suicidal  Thought Form: Obsessive/Perseverative     Medication  Psychotropic medications:   Medication Orders - Psychiatric (From admission, onward)      Start     Dose/Rate Route Frequency Ordered Stop    12/04/24 0800  FLUoxetine (PROzac) capsule 40 mg         40 mg Oral DAILY 12/03/24 2258 12/03/24 2300  OLANZapine (zyPREXA) tablet 7.5 mg         7.5 mg Oral AT BEDTIME 12/03/24 2258 12/03/24 2300  traZODone (DESYREL) tablet 100 mg         100 mg Oral AT BEDTIME 12/03/24 2258 12/03/24 2258  OLANZapine (zyPREXA) tablet 5 mg         5 mg Oral DAILY PRN 12/03/24 2258 12/03/24 2257  hydrOXYzine HCl (ATARAX) tablet 25-50 mg         25-50 mg Oral EVERY 4 HOURS PRN 12/03/24 2258            Current Facility-Administered Medications   Medication Dose Route Frequency Provider Last Rate Last Admin    benztropine (COGENTIN) tablet 1 mg  1 mg Oral BID Ambrocio Ferro MD   1 mg at 12/03/24 2323    cetirizine (zyrTEC) tablet 10 mg  10 mg Oral Daily Ambrocio Ferro MD        cloNIDine (CATAPRES) tablet 0.1 mg  0.1 mg Oral At Bedtime Ambrocio Ferro MD   0.1 mg at 12/03/24 2323    divalproex sodium extended-release (DEPAKOTE ER) 24 hr tablet 500 mg  500 mg Oral At Bedtime Ambrocio Ferro MD   500 mg at 12/03/24 2323    FLUoxetine (PROzac) capsule 40 mg  40 mg Oral Daily Ambrocio Ferro MD        hydrOXYzine HCl (ATARAX) tablet 25-50 mg  25-50 mg Oral Q4H PRN Ambrocio Ferro MD        lactase (LACTAID) tablet 3,000 Units  3,000 Units Oral TID PRN Ambrocio Ferro MD        levothyroxine (SYNTHROID/LEVOTHROID) tablet 25 mcg  25 mcg Oral Daily Ambrocio Ferro MD        OLANZapine (zyPREXA) tablet 5 mg  5 mg Oral Daily PRN Ambrocio Ferro MD        OLANZapine (zyPREXA) tablet 7.5 mg  7.5 mg Oral At Bedtime Ambrocio Ferro MD   7.5 mg at 12/03/24 2260    ondansetron (ZOFRAN ODT) ODT tab 8 mg  8 mg Oral Q8H PRN Pillo,  MD Ambrocio        pantoprazole (PROTONIX) EC tablet 40 mg  40 mg Oral Daily Ambrocio Ferro MD        traZODone (DESYREL) tablet 100 mg  100 mg Oral At Bedtime Ambrocio Ferro MD   100 mg at 12/03/24 8691     Current Outpatient Medications   Medication Sig Dispense Refill    acetaminophen (TYLENOL) 325 MG tablet Take 650 mg by mouth every 6 hours as needed for mild pain      albuterol (PROAIR HFA/PROVENTIL HFA/VENTOLIN HFA) 108 (90 Base) MCG/ACT inhaler Inhale 1 puff into the lungs every 6 hours as needed for shortness of breath.      ARIPiprazole lauroxil ER (ARISTADA) 882 MG/3.2ML intra-muscular Inject 882 mg into the muscle every 28 days On the 22nd of each month      benztropine (COGENTIN) 1 MG tablet Take 1 mg by mouth 2 times daily.      cetirizine (ZYRTEC) 10 MG tablet Take 10 mg by mouth daily.      cloNIDine (CATAPRES) 0.1 MG tablet Take 1 tablet (0.1 mg) by mouth at bedtime. 30 tablet 0    clotrimazole (LOTRIMIN) 1 % external cream Apply topically 2 times daily.      dicyclomine (BENTYL) 20 MG tablet Take 20 mg by mouth 2 times daily.      divalproex sodium extended-release (DEPAKOTE ER) 500 MG 24 hr tablet Take 1 tablet (500 mg) by mouth at bedtime. 30 tablet 0    docusate sodium (COLACE) 50 MG capsule Take 100 mg by mouth 2 times daily      FLUoxetine (PROZAC) 40 MG capsule Take 1 capsule (40 mg) by mouth daily. 30 capsule 0    hydrOXYzine HCl (ATARAX) 25 MG tablet Take 1-2 tablets (25-50 mg) by mouth every 4 hours as needed for anxiety. 30 tablet 0    lactase (LACTAID) 3000 UNIT tablet Take 1 tablet (3,000 Units) by mouth 3 times daily as needed for indigestion 30 tablet 1    levothyroxine (SYNTHROID/LEVOTHROID) 25 MCG tablet Take 25 mcg by mouth daily.      multivitamin w/minerals (MULTI-VITAMIN) tablet Take 1 tablet by mouth daily.      OLANZapine (ZYPREXA) 5 MG tablet Take 1 tablet (5 mg) by mouth daily as needed (agitation). 15 tablet 0    OLANZapine (ZYPREXA) 7.5 MG tablet Take 1 tablet (7.5 mg) by mouth  at bedtime. 30 tablet 0    ondansetron (ZOFRAN) 8 MG tablet Take 1 tablet (8 mg) by mouth every 8 hours as needed for nausea. 30 tablet 0    pantoprazole (PROTONIX) 40 MG EC tablet Take 1 tablet (40 mg) by mouth daily. 30 tablet 0    polyethylene glycol (MIRALAX) 17 GM/Dose powder Take 1 Capful by mouth daily as needed for constipation.      promethazine (PHENERGAN) 12.5 MG tablet Take 1 tablet (12.5 mg) by mouth every 6 hours as needed for nausea.      senna-docusate (SENOKOT-S/PERICOLACE) 8.6-50 MG tablet Take 1 tablet by mouth 2 times daily as needed.      sodium chloride 0.65 % nasal spray Spray 1 spray in nostril daily as needed.      traZODone (DESYREL) 100 MG tablet Take 100 mg by mouth at bedtime.      Vitamin D, Cholecalciferol, 25 MCG (1000 UT) TABS Take 1,000 Units by mouth daily             Current Care Team  Patient Care Team:  [facility] as PCP - General (Clinic)  Chloe Alva APRN CNP as Nurse Practitioner (OB/Gyn)  Selbe Jones PA-C as Physician Assistant  Fidel Neely MD as Assigned Heart and Vascular Provider    Diagnosis  Patient Active Problem List   Diagnosis Code    MENTAL HEALTH     Suicidal ideation R45.851    Suicidal ideations R45.851    Intellectual disability F79    Bipolar affective disorder, currently depressed, moderate (H) F31.32    Borderline personality disorder (H) F60.3    Morbid obesity (H) E66.01    Unspecified mood (affective) disorder (H) F39    Chronic constipation K59.09    History of suicide attempt Z91.51    Hyperhidrosis R61    Major depressive disorder, recurrent, in full remission (H) F33.42    Vitamin D deficiency E55.9    Syncope R55    Seizure-like activity (H) R56.9    Syncope, unspecified syncope type R55    Bipolar affective disorder (H) F31.9    Has access to planned means of suicide R45.851    PTSD (post-traumatic stress disorder) F43.10    Suicidal thoughts R45.851    Suicide ideation R45.851       Primary Problem This Admission  F60.3  Borderline personality disorder  R45.851 Suicide ideation  F31.32 Bipolar affective disorder, currently depressed, moderate    Clinical Summary and Substantiation of Recommendations   It is the recommendation of this provider that pt remains under (psychiatric) observation with extended care. Although pt appears to experience chronic SI with frequent hospital visits, pt appeared unable to plan/contract for safety and to become slightly agitated when talking about ending her life by slicing her neck open with a knife if she were to return home to her family after their argument with threats to kick her out or call the police. Pt reported not yet connecting with a new psychiatrist with no awareness of appointments scheduled and stated that recent medication changes so far appear ineffective. Pt may benefit from psychoeducation about reasonable expectations of benefits following medication adjustments or consultation regarding her current medications in general.     Patient coping skills attempted to reduce the crisis:  Help-seeking    Disposition  Recommended disposition: Individual Therapy, Medication Management, Observation        Reviewed case and recommendations with attending provider. Attending Name: Dr. Ferro       Attending concurs with disposition: yes       Patient and/or validated legal guardian concurs with disposition:   yes       Final disposition:  observation    Legal status on admission: Voluntary/Patient has signed consent for treatment    Assessment Details   Total duration spent with the patient: 16 min     CPT code(s) utilized: Non-Billable    Bhupendra Pearson Psychotherapist Trainee  [facility] - Triage & Transition Services  Callback:  [phone]

## 2024-12-04 NOTE — PLAN OF CARE
Sadaf Ross  December 4, 2024  Plan of Care Hand-off Note     Patient Care Path: observation    Plan for Care:   It is the recommendation of this provider that pt remains under (psychiatric) observation with extended care. Although pt appears to experience chronic SI with frequent hospital visits, pt appeared unable to plan/contract for safety and to become slightly agitated when talking about ending her life by slicing her neck open with a knife if she were to return home to her family after their argument with threats to kick her out or call the police. Pt reported not yet connecting with a new psychiatrist with no awareness of appointments scheduled and stated that recent medication changes so far appear ineffective. Pt may benefit from psychoeducation about reasonable expectations of benefits following medication adjustments or consultation regarding her current medications in general.    Identified Goals and Safety Issues: Monitoring under observation, medication management    Overview:  Yarely Ross ([facility]): [phone]      Legal Status: Legal Status at Admission: Voluntary/Patient has signed consent for treatment    Psychiatry Consult: requested     Updated attending physician and RN regarding plan of care.      Bhupendra Pearson, Psychotherapist Trainee

## 2024-12-04 NOTE — ED NOTES
Bed: [facility]  Expected date:   Expected time:   Means of arrival:   Comments:  Isai 639  24/F SI/ [place]

## 2024-12-04 NOTE — CONSULTS
Patient scheduled for discharge before ED psych consult completed.  Patient did not want medications adjusted.

## 2024-12-04 NOTE — ED PROVIDER NOTES
The patient is feeling safe to return home. She will be discharged back to her family home once her sister has arrived at the house.      Ashely Toth MD  12/04/24 7710

## 2024-12-04 NOTE — PROGRESS NOTES
"Triage and Transition Services Extended Care Reassessment     Patient: Sadaf goes by \"Sadaf,\" uses she/her pronouns  Date of Service: December 4, 2024  Site of Service: [facility] EMERGENCY DEPARTMENT                               Patient was seen yes  Mode of Assessment: In person     Reason for Reassessment:      History of Patient's Original Emergency Room Encounter: (P) Per pt, she endorsed past dx of depression, anxiety, bipolar, and PTSD. Per chart, pt also appears to have past dx of BPD, ADHD, and ID. Pt reported having a medication (which she couldn't recall the name of) added on during her last [facility] visit in November, but stated she hasn't noticed any benefit from it yet. Pt stated she tries \"listening to music and every coping skill but they don't work.\" Pt stated she feels safe at home but cannot handle feeling disrespected. Pt denied knowledge of MH dx in the family. Pt endorsed meeting with a therapist c. 2x/week, having a , and having a new psychiatrist but not yet meeting with them.    Current Patient Presentation: Pt A&0 x4, requesting d/c and denying any safety or medical concerns.    Presentation Summary: Pt is now requesting d/c following overnight of observation due to medication concerns. Pt now declines med concerns, does not want to meet with psychiatrist or additional providers. Pt waiting to go home until her sister can meet her there \"because I don't have my house keys.\" Pt has an ED treatment plan recommending limited time in hospital. Pt does not present a safety concern to herself or others, request for d/c honored. Safety plan already created, crisis resources placed in avs.    Changes Observed Since Initial Assessment: decrease in presenting symptoms    Therapeutic Interventions Provided: Engaged in safety planning, Worked on relapse prevention planning (review of stressors, early warning signs, written plan to respond to signs, and rehearse plan)., Provided " positive reinforcement for progress towards goals, gains in knowledge, and application of skills previously taught.    Current Symptoms: anxious apathy anxious        Mental Status Exam   Affect: Appropriate  Appearance: Disheveled  Attention Span/Concentration: Attentive  Eye Contact: Engaged    Fund of Knowledge: Appropriate   Language /Speech Content: Fluent  Language /Speech Volume: Normal  Language /Speech Rate/Productions: Minimally Responsive  Recent Memory: Intact  Remote Memory: Intact  Mood: Depressed, Irritable  Orientation to Person: Yes   Orientation to Place: Yes  Orientation to Time of Day: Yes  Orientation to Date: Yes     Situation (Do they understand why they are here?): Yes  Psychomotor Behavior: Normal  Thought Content: Clear  Thought Form: Goal Directed    Treatment Objective(s) Addressed: rapport building, identifying an appropriate aftercare plan, safety planning    Patient Response to Interventions: verbalizes understanding    Progress Towards Goals:  Patient Reports Symptoms Are: stable  Patient Progress Toward Goals: is making progress  Comment: Pt requesting d/c, appears to be at baseline. No safety concerns noted.  Next Step to Work Toward Discharge: engaging in safety planning with collateral sources, collaboration with OP team/family/friends  Collaboration Comment: Call with patient's mother Yarely Ross at [phone], details below.    Case Management: Case Management Included: collaborating with patient's support system  Details on Collaborating with Patient's Support System: Patient's mother Yarely Ross at [phone]  Summary of Interaction: Patient's mother is in agreement with patient returning home today but is unable to pick her up due to work. Agreed with transport from hospital and pt will be met at home by her sister.    C-SSRS Since Last Contact:   1. Wish to be Dead (Since Last Contact): No  2. Non-Specific Active Suicidal Thoughts (Since Last Contact): No      Actual Attempt (Since Last Contact): No     Calculated C-SSRS Risk Score (Since Last Contact): No Risk Indicated    Plan: Final Disposition / Recommended Care Path: discharge  Plan for Care reviewed with assigned Medical Provider: yes  Plan for Care Team Review: RN, provider  Comments: Dr. Toth  Patient and/or validated legal guardian concurs: yes  Clinical Substantiation: Pt present on obs for psych consult due to wanting med recs. Pt now declining psych consult, no concerns for med adjustment. No safety concerns. Requesting to d/c home. Discharge scheduled to accomodate pt sister returning home for support and for pt access to the house as she does not have her keys. Discharged without concern with updated safety plan and crisis resources in AVS.    Legal Status: Legal Status at Admission: Voluntary/Patient has signed consent for treatment    Session Status: Time session started: 1110  Time session ended: 1120  Session Duration (minutes): 10 minutes  Session Number: 1  Anticipated number of sessions or this episode of care: 1    Session Start Time: 1110  Session Stop Time: 1120  CPT codes: Non-Billable  Time Spent: 10 minutes      CPT code(s) utilized: Non-Billable    Diagnosis:   Patient Active Problem List   Diagnosis Code    MENTAL HEALTH     Suicidal ideation R45.851    Suicidal ideations R45.851    Intellectual disability F79    Bipolar affective disorder, currently depressed, moderate (H) F31.32    Borderline personality disorder (H) F60.3    Morbid obesity (H) E66.01    Unspecified mood (affective) disorder (H) F39    Chronic constipation K59.09    History of suicide attempt Z91.51    Hyperhidrosis R61    Major depressive disorder, recurrent, in full remission (H) F33.42    Vitamin D deficiency E55.9    Syncope R55    Seizure-like activity (H) R56.9    Syncope, unspecified syncope type R55    Bipolar affective disorder (H) F31.9    Has access to planned means of suicide R45.851    PTSD (post-traumatic  stress disorder) F43.10    Suicidal thoughts R45.851    Suicide ideation R45.851       Primary Problem This Admission: Active Hospital Problems    Suicide ideation      *Borderline personality disorder (H)      Bipolar affective disorder, currently depressed, moderate (H)        YOSHI Aponte   Licensed Mental Health Professional (LMHP), [facility]  [phone]

## 2024-12-04 NOTE — ED TRIAGE NOTES
Triage Assessment (Adult)       Row Name 12/03/24 3077          Triage Assessment    Airway WDL WDL        Respiratory WDL    Respiratory WDL WDL        Skin Circulation/Temperature WDL    Skin Circulation/Temperature WDL WDL        Cardiac WDL    Cardiac WDL WDL        Peripheral/Neurovascular WDL    Peripheral Neurovascular WDL WDL        Cognitive/Neuro/Behavioral WDL    Cognitive/Neuro/Behavioral WDL WDL

## 2024-12-05 ENCOUNTER — TELEPHONE (OUTPATIENT)
Dept: BEHAVIORAL HEALTH | Facility: CLINIC | Age: 24
End: 2024-12-05
Payer: MEDICARE

## 2024-12-05 ENCOUNTER — TELEPHONE (OUTPATIENT)
Dept: BEHAVIORAL HEALTH | Facility: CLINIC | Age: 24
End: 2024-12-05

## 2024-12-05 VITALS
HEART RATE: 84 BPM | TEMPERATURE: 98.2 F | RESPIRATION RATE: 18 BRPM | DIASTOLIC BLOOD PRESSURE: 60 MMHG | SYSTOLIC BLOOD PRESSURE: 100 MMHG | OXYGEN SATURATION: 98 %

## 2024-12-05 PROCEDURE — 99284 EMERGENCY DEPT VISIT MOD MDM: CPT | Performed by: PSYCHIATRY & NEUROLOGY

## 2024-12-05 PROCEDURE — 250N000013 HC RX MED GY IP 250 OP 250 PS 637: Performed by: EMERGENCY MEDICINE

## 2024-12-05 RX ADMIN — DOCUSATE SODIUM 100 MG: 100 CAPSULE, LIQUID FILLED ORAL at 10:04

## 2024-12-05 RX ADMIN — CETIRIZINE HYDROCHLORIDE 10 MG: 10 TABLET, FILM COATED ORAL at 10:03

## 2024-12-05 RX ADMIN — DICYCLOMINE HYDROCHLORIDE 20 MG: 20 TABLET ORAL at 10:04

## 2024-12-05 RX ADMIN — LEVOTHYROXINE SODIUM 25 MCG: 25 TABLET ORAL at 10:04

## 2024-12-05 RX ADMIN — Medication 25 MCG: at 10:05

## 2024-12-05 RX ADMIN — BENZTROPINE MESYLATE 1 MG: 1 TABLET ORAL at 10:04

## 2024-12-05 RX ADMIN — FLUOXETINE HYDROCHLORIDE 40 MG: 20 CAPSULE ORAL at 10:03

## 2024-12-05 ASSESSMENT — ACTIVITIES OF DAILY LIVING (ADL)
ADLS_ACUITY_SCORE: 55

## 2024-12-05 ASSESSMENT — COLUMBIA-SUICIDE SEVERITY RATING SCALE - C-SSRS
5. HAVE YOU STARTED TO WORK OUT OR WORKED OUT THE DETAILS OF HOW TO KILL YOURSELF? DO YOU INTEND TO CARRY OUT THIS PLAN?: NO
TOTAL  NUMBER OF INTERRUPTED ATTEMPTS SINCE LAST CONTACT: NO
1. SINCE LAST CONTACT, HAVE YOU WISHED YOU WERE DEAD OR WISHED YOU COULD GO TO SLEEP AND NOT WAKE UP?: YES
TOTAL  NUMBER OF ABORTED OR SELF INTERRUPTED ATTEMPTS SINCE LAST CONTACT: NO
ATTEMPT SINCE LAST CONTACT: NO
REASONS FOR IDEATION SINCE LAST CONTACT: MOSTLY TO GET ATTENTION, REVENGE, OR A REACTION FROM OTHERS
6. HAVE YOU EVER DONE ANYTHING, STARTED TO DO ANYTHING, OR PREPARED TO DO ANYTHING TO END YOUR LIFE?: NO
2. HAVE YOU ACTUALLY HAD ANY THOUGHTS OF KILLING YOURSELF?: YES
SUICIDE, SINCE LAST CONTACT: NO

## 2024-12-05 NOTE — DISCHARGE INSTRUCTIONS
You have been evaluated and cared for by the mental health team here.  They have reassessed you at this point and given your options for potential crisis shelter/residence that you have declined.  Recommendations are to allow you to be discharged currently at this point with follow-up recommendations below.    Return if any safety issues etc.        [facility] SCHEDULING:  Today you were seen by a licensed mental health professional through [facility] ([facility])  for a crisis assessment in the Emergency Department at [facility].  It is recommended that you follow up with your estabished providers (psychiatrist, mental health therapist, and/or primary care doctor - as relevant) as soon as possible. Coordinators from [facility] will be calling you in the next 24-48 hours to ensure that you have the resources you need.  You can also contact [facility] coordinators directly at [phone].      [facility] maintains an extensive network of licensed behavioral health providers to connect patients with the services they need.  We do not charge providers a fee to participate in our referral network.  We match patients with providers based on a patient s specific needs, insurance coverage, and location.  Our first effort will be to refer you to a provider within your care system, and will utilize providers outside your care system as needed.      Crisis Residences     [facility]     [address] [address]     Phone [phone]     fax [phone]     [facility]     [address] [facility] [address] [facility] [address]     Phone [phone]     fax [phone]     [facility]     [address], [address]     Phone [phone]     fax [phone]          [facility] [employer]     [facility] [employer] , [address], [address]      Phone [phone]     fax [phone]           [facility]     [address] [place], [address]     Phone [phone]     fax [phone]          [facility]     [address] [place] [address]     Phone [phone] / [phone]     fax [phone]     [facility]     [address] [place] [address]     Phone [phone] or [phone]     fax [phone]          [facility] HOMELESSNESS RESOURCES       For shelter tonight, start with Adult Shelter Connect Overnight Shelter: [phone]  *Phone lines are closed between 5:30-7:30 pm    [address] (enter on the [address] side near the [facility] corner)  Walk-in Hours: 9 am - 5:30 pm Monday-Friday and 1 pm - 5:30 pm Saturday and Sunday    [facility]  [phone]  [address] [facility]  [phone]  [address] [facility] Shelter  [phone]  [address] [facility]  [phone]  [address] [facility]  [phone]  [address] [place] [address]    To start making a plan for a more long-term solution, contact the Coordinated Entry Homeless Assistance Program:    Coordinated Entry Homeless Assistance  [facility]  [address] [place] [address]  [phone]  Open Wednesdays and Thursdays 8 am-2 pm  The Coordinated Entry System is the [facility]'s approach to organizing and providing housing services for people experiencing homelessness in [facility].  Because housing resources are limited, this process is designed to ensure that individuals and families with the highest vulnerability, service needs, and length of homelessness receive top priority in housing placement.    Assessment changes due to COVID-19 virus    [facility] Human Services family assessors will now be conducting  assessments by phone. If you are working with a family staying in a place other than a [facility] shelter and is eligible for Coordinated Entry, continue to contact [facility]  at [phone] to set up an assessment.    For single adults, [facility] will be suspending drop-in hours at [facility]. To have a Coordinated Entry assessment completed, call the [facility]' certified assessors: Otilio at [phone] or Leyla at [phone] directly to set up a time to meet    Call 211 at any time on any day for questions about services and resources available to you.      Family Shelters    [facility] Shelter Team  [phone]  [address] [facility], Floors 5 & 6              Youth, families & disabled adults    Hours: Mon-Fri 8:00 am-4:30 pm.  After-Hours Shelter Team  [address]         Drop-Ins    [facility]  [phone]  [address] [place] [address] ([facility])              Showers/Laundry (8-11am)              Health Care              Employment Services              Breakfast (7-8 am)              Lunch (11:30am-12:30pm)    Hours: Mon-Sat: Summer 7am-3pm; Winter 7am-4pm.         [facility] [phone]  [address] [place] [address]  Hours: Mon, Wed and Fri 9:00-11:30 am      Clothing    [facility]  [phone]  [address] [place] [address]  Hours: M-Th 10-11:30am & 1:30-3:30pm; Sat/Sun 9:30-10:30 am         Free Meals & Showers    [facility]  [phone]  [address] [place] [address]  Dinner: Mon-Fri 5:30-6:30pm (No showers)    [facility]  [phone]  Meals ([address]): Women & Children M-F 8:30-9am; Everyone: M-F 12-1pm; Sat/Sun, 10:30-11:30 am  Showers ([address] [place] [address]): Mon-Fri 9-10 am    [facility] [place] [facility]  [phone]  [address]  Dinner: Thurs - Mon 6 pm  Showers: Daily 8 - 4:30 pm    Health Care    [facility]  [phone]  Clinics are in [address] [place] shelters/drop-in  centers.  Hours: Mon-Fri at varying sites. Call for sites/times. No insurance or appts required to receive care.  Clinic sites: [facility] [place], [facility], [facility], [facility], [facility], [facility], [facility], [facility], [place] [facility], [facility] Shelter, [facility]      Domestic/Sexual Violence Survivors    [facility]  [phone]  [address] [place] [address]            Singles women, families, survivors of prostitution & domestic abuse    Rape & Sexual Violence Hotline  946-528-QDPQ    [facility] Toll-Free 24h crisis line  [phone]     [facility] Crisis Line  [phone]      Rule 25 Chemical Health Assessments    [facility]  [phone] [address] [facility]  Hours: Mon-Fri, 8 am-2:30 pm (first come, first served)    [facility]  [phone] [address] [facility]  Walk-in: M-F, 7am-4pm (First come, first served or by appt)      Mental Health Care    [facility] ([facility])  Suicidal: [phone] Consultation: [phone]  [address] [facility], 24/7 Crisis Intervention Center     Walk-in Counseling Center  [phone]  Hours: Mon, Wed, Fri 1-3pm; Mon-Thurs 6:30-8:30pm      Veterans Services    [facility] Service [facility]  [phone]  Hours: Mon-Fri 8am-4:30 pm; 1st Floor [facility]    [facility] Community Resource [facility] Referral  [phone]  [address] [place] [address]; Hours: Mon-Fri 7a-5p    [place] Assistance [place] [facility] (MACV)  [phone]    [facility] Supportive Services for Veterans & Families (SSVF)  [phone] [address]

## 2024-12-05 NOTE — ED NOTES
Patient very tearful, RN attempted to get patient to accept crisis housing trough DEC. Patient continues to decline, states she will stay in shelter tonight and meet with friend tomorrow

## 2024-12-05 NOTE — TELEPHONE ENCOUNTER
R: [place]  Access Inpatient Bed Call Log  12/5/24 @ 1:00am   Intake has called facilities that have not updated their bed status within the last 12 hours.     *METRO:  [place] -- [facility]: @ capacity.  [facility]: @ cap per website. Reporting no reviews overnight.  [place] -- [facility]: @ cap per website. Low acuity  [place] -- [facility]: @ cap per website. Low acuity only.  [place] [facility]: @ cap per website.  [facility]: @ cap per website.   [facility]/ beds: POSTING 3 BEDS. Ages 18-35, Voluntary only, NO aggression/physical/sexual assault, violence hx or drug abuse, or psychosis. Negative Covid   [place] [facility]: @ cap per website.   [place] -- [facility]: @ cap per website.  [place] -- [facility]: @ cap per website. Do not review overnight.      Pt remains on waitlist pending appropriate placement availability.

## 2024-12-05 NOTE — ED NOTES
Patient and sitter came and told RN she threw up all her meds due to anxiety. RN nor sitter visualized meds that were thrown up. RN  gave patient ginger ale. Patient declined zofran. Provider notified.

## 2024-12-05 NOTE — TELEPHONE ENCOUNTER
R:    3:48 PM Per EC/ [facility] &  Intake Chat MRN: [id number] SM, adult, please remove from work list, patient is discharging.    Pt removed from worklist.

## 2024-12-05 NOTE — TELEPHONE ENCOUNTER
R: author reviewed care plan in [facility].  Paged Rikki at 8:39 am and asked her to review pt's care plan and to call back.     Per Rkiki at 8:49 am, she is declining pt due to pt receiving secondary gain historically when hospitalized so not appropriate for any mh unit. She said pt engages in self harm to try to not leave the hospital. Author suggested a 3 way call with the ED MD but she declined and said to escalate to [place], since pt has a care plan.     Paged Corina at 8:52 am.    Per Corina at 8:58 am. pt should have a psych consult in the ED. She said she is concerned about reinforcing maladaptive and avoidant bx by hospitalization and recommends an ED psych consult for further clarification re: appropriateness for admit. 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     Called Salbador NIEVES in ED at 9:09 am, explained the above and requested a psych consult. Author requested he call intake once this has been completed and he agreed to do this.     Per review of consult, discharge is recommended. Await call from ED to confirm this officially.
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
## 2024-12-05 NOTE — ED NOTES
RN spoke to DEC, waiting to hear back from her manager on plan of action for patient. Patient is attempting to claim new suicidal thinking. RN spoke to patient about possibly going to a crisis shelter. Patient screaming she will not go there, just fucking get my stuff and let me leave. RN asked patient where she is going to sleep, requesting patient allow us to set up a crisis bed for her. Patient adamantly refusing RN's attempts to help her find shelter, keeps yelling at RN to get her fucking shit and let her go. RN spoke to Susie from DEC who is in agreement that patient is at baseline and can be discharged.
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
## 2024-12-05 NOTE — PHARMACY-ADMISSION MEDICATION HISTORY
Admission Medication History completed by pharmacist, Soraya Crowell on 11/25/24. Please see Pharmacy - Admission Medication History note under previous encounter at [facility]. for information regarding prior to admission medications. Patient has been admitted multiple times to [facility] ED for extended periods of time since original medhx note was completed.     Nany Valdez, PharmD   Clinical Pharmacist

## 2024-12-05 NOTE — TELEPHONE ENCOUNTER
3:54 AM Intake received a call from álvaro BORREGO. Per Álvaro, this pt has been declined due to needing iCU level of care.

## 2024-12-05 NOTE — ED NOTES
IP MH Referral Acuity Rating Score (RARS)    LMHP complete at referral to IP MH, with DEC; and, daily while awaiting IP MH placement. Call score to PPS.  CRITERIA SCORING   New 71 HH and Involuntary for IP MH (not adolescent) 0/1   Boarding over 24 hours 0/1   Vulnerable adult at least 55+ with multiple co morbidities; or, Patient age 11 or under 0/1   Suicide ideation without relief of precipitating factors 1/1   Current plan for suicide 1/1   Current plan for homicide 1/1   Imminent risk or actual attempt to seriously harm another without relief of factors precipitating the attempt 1/1   Severe dysfunction in daily living (ex: complete neglect for self care, extreme disruption in vegetative function, extreme deterioration in social interactions) 1/1   Recent (last 2 weeks) or current physical aggression in the ED 0/1   Restraints or seclusion episode in ED 0/1   Verbal aggression, agitation, yelling, etc., while in the ED 0/1   Active psychosis with psychomotor agitation or catatonia 0/1   Need for constant or near constant redirection (from leaving, from others, etc).  0/1   Intrusive or disruptive behaviors 0/1   TOTAL Acuity Total Score: 5

## 2024-12-05 NOTE — TELEPHONE ENCOUNTER
R:  3:48 PM Intake received a message via EC/EmPath chat stating pt is discharging and can be removed from [facility] work list.  following informed.

## 2024-12-05 NOTE — PLAN OF CARE
"Sadaf Ross  December 4, 2024  Plan of Care Hand-off Note     Patient Care Path: inpatient mental health    Plan for Care:   Pt presenting in the ER again today due to worsening of agitation, suicidal and homicidal ideations.  Pt reported ongoing conflict with her family and especially with her sister's boyfriend, \"Pillo\" who has been asking her to take her medications as trigger leading to her current mental health crisis.  Pt shared she did not like taking her medications but got angry whenever her sister's boyfriend pressure her to take her medications.  Pt reported having poor sleep and low appetite. Pt endorsed suicidal ideations with plans to either overdosing on pills or banging her head on the wall. Pt endorsed homicidal ideations towards her sister's boyfriend, \"Pillo\" with plan to cutting his throat with a knife. Pt denied having access to firearms. Pt reported history of SIB by biting, scratching and banging her head on the wall. Pt noted her last SIB was about a week ago. Pt reported previous suicide attempt in 2022 by trying to cut her throast with a knife but she changed her mind as she aborted after leaving a bianca on her neck.  Pt was not able to engage in her DEC safety plan as she did not feel safe to return home.  Pt noted if she went back home today whe will likely to kill her sister's boyfriend, \"Pillo\" and go to intermediate and kill herself as well.  Pt continue to be agitated in the ER room and emotionally dysregulated as she was not able to calm down.  Pt was imminent danger to herself and danger to her family out in the community.  Writer recommended Pt to engage in psychiatric hospitalization for further stabilization, safety assessment and medication management.    EC will follow up with Pt to provide further therapeutic support while on boarding.  Pt would benefit from medication evaluation, treatment and learning new coping skills.    Identified Goals and Safety Issues: Pt endorsed active " "suicidal ideations with plan to overdosing on pills or banging her head on the wall.  Pt endorsed active homicidal ideations with plan to kill her sister's boyfriend, \"Juan\" by cutting his neck with a knife.  Pt reported she tried to cut her throat with a knife in 2022 but stopped after making a bianca on her neck as she changed her mind.  Pt engaged in SIB by biting and scratching herself about a week ago.  Pt reported she was having strong urges to biting herself to harm herself in the ER.    Overview:  [facility] Geronimo [phone]       Only allow calls and visits from designated visitors and care team members    Legal Status: Legal Status at Admission: Voluntary/Patient has signed consent for treatment (Pt is voluntary but holdable due to active suicidal and homicidal ideations.)    Psychiatry Consult:       Updated   regarding plan of care.           MARIANNE Beverly       "

## 2024-12-05 NOTE — ED PROVIDER NOTES
"Emergency Department I-PASS Sign-out      Illness Severity: \"Watcher\"    Patient Summary:  24 year old female with pertinent PMH of borderline personality disorder, bipolar affective disorder and generalized anxiety disorder who presented with suicidal ideation and homicidal ideation towards her sisters boyfriend.    ED Course/treatment plan: Patient had a mental health assessment.  Patient had been discharged about an hour prior to coming back via EMS stating that she was going to kill her sisters boyfriend after he told her he she should take her medications.  She is currently living with her mom, sister and sister's boyfriend.  Per collateral she stated she was going to kill the whole family.  She is also endorsing suicidal and self-harm ideation.  Per mental health assessment patient needs inpatient stabilization.    Clinical Impression:  (R45.851) Suicidal ideation    (R45.850) Homicidal ideation      Edited by: Bernardino Schwarz MD at 2024    Action List:  Tests to Follow-up:  None    Medications Reconciled/Ordered:  Yes    ED Mental Health Boarding Order Set Used for Diet/PRNs/Other:  Yes    DEC, Extended Care, Psych Consult Orders:  Extended Care and Psychiatry consult ordered.    Situational Awareness & Contingency Plannin Hour Hold Status:  Voluntary, would reassess if wanting to leave.  Active Orders  N/A    Disposition:  Admit/Transfer to Behavioral Health    Boarding subsequent shift/day updates:      Edited by: Bernardino Schwarz MD at 2024    Synthesis & Events after sign-out:  Patient evaluated by extended care DEC with current recommendations to discharge patient we did offer crisis bed but patient declines at this point.  Patient unable to go home but according to mental health team patient be discharged per her own discretion with follow-up recommendations.        Rakesh Estrella MD   Emergency Medicine     Rakesh Estrella MD  24 1512    "

## 2024-12-05 NOTE — TELEPHONE ENCOUNTER
S: [facility] , DEC  Caleb  calling at 6:52 PM about a 24 year old/Female presenting with SI and HI.     B: Pt arrived via EMS. Presenting problem, stressors: Pt reports worsening depression, anxiety, SI and HI.  SI with plan to overdose on pills or bang her head on the wall.  Pt reports HI towards sisters boyfriend.  Pt endorses plan to cut his throat.  Per mom, pt threatened to kill sisters boyfriend as well as the whole family.  Stressors- Pt has not been taking medications.  Pt has been having conflict with sisters boyfriend.  He reminded her to take her meds because she has not been taking them, which upset pt.  Pt is labile in the ED.      Pt affect in ED: Labile  Pt Dx: Generalized Anxiety Disorder, Bipolar Disorder, Borderline Personality Disorder, and Intellectual Disability  Previous [facility]MH hx? Yes: [facility]  Pt endorses SI with a plan to overdose or bang head on the wall    Hx of suicide attempt? Yes: 2022 pt attempted to cut her throat.    Pt endorses SIB via biting, scratching, banging head, most recent episode a week ago  Pt endorses HI towards sisters boyfriend with plans and intent   Pt denies hallucinations .   Pt RARS Score: 5    Hx of aggression/violence, sexual offenses, legal concerns, Epic care plan? describe: No  Current concerns for aggression this visit? No  Does pt have a history of Civil Commitment? No  Is Pt their own guardian? Yes    Pt is prescribed medication. Is patient medication compliant? No  Pt endorses OP services: Psychiatrist, Therapist, and   CD concerns: None  Acute or chronic medical concerns: No  Does Pt present with specific needs, assistive devices, or exclusionary criteria? None      Pt is ambulatory  Pt is able to perform ADLs independently      A: Pt to be reviewed for [facility] admission. Pt is Voluntary  Preferred placement: [facility]    COVID Symptoms: No  If yes, COVID test required   Utox: Negative   CMP: N/A  CBC: N/A  HCG: Negative    R: Patient  cleared and ready for behavioral bed placement: Yes  Pt placed on [facility] worklist? Yes    Does Patient need a Transfer Center request created? No, Pt is located within [facility] ED, [facility] ED, or [facility]   Paged provider Naegele for review for 6A.    Provider Naegele states she has concern due to pt's care coordination note and recently being discharged from unit 6A.  Dr Arriaga updated pt's care coordination note a week ago, stating secondary gain and pt did not benefit from hospitalization.  Provider Naegele reports she will further discuss pt 12/5 in the morning with Dr Arriaga regarding the most appropriate route of care for pt.  Intake to follow up with provider Naegele or Corina in the morning regarding pt.      R: [facility] Access Inpatient Bed Call Log 12/4/24  @3:10 PM:    Intake has called facilities that have not updated the bed status within the last 12 hours.         [facility] is at capacity.           [facility] is posting 0 beds. [phone]   [facility] is posting 0 beds. Negative covid required.  [facility] is posting 0 beds. Neg covid. No high school/[facility]-psych.   [facility] is posting 0 beds. [phone]  [facility] is posting 0 beds. [phone]   [facility] is posting 2 Young Adult beds. Negative covid.  Per call, facility is able to review pt.    [facility] is posting 0 beds [phone].      Pt remains on the work list pending appropriate bed availability.     10:00 PM [facility] agreeable to review pt.  Information faxed.  Covid requested for review.

## 2024-12-06 ENCOUNTER — HOSPITAL ENCOUNTER (EMERGENCY)
Facility: CLINIC | Age: 24
Discharge: HOME | End: 2024-12-06
Attending: STUDENT IN AN ORGANIZED HEALTH CARE EDUCATION/TRAINING PROGRAM | Admitting: STUDENT IN AN ORGANIZED HEALTH CARE EDUCATION/TRAINING PROGRAM
Payer: MEDICARE

## 2024-12-06 VITALS
HEART RATE: 91 BPM | TEMPERATURE: 98.2 F | OXYGEN SATURATION: 95 % | SYSTOLIC BLOOD PRESSURE: 117 MMHG | RESPIRATION RATE: 17 BRPM | DIASTOLIC BLOOD PRESSURE: 82 MMHG

## 2024-12-06 DIAGNOSIS — Z86.59 HISTORY OF BIPOLAR DISORDER: ICD-10-CM

## 2024-12-06 PROCEDURE — 99283 EMERGENCY DEPT VISIT LOW MDM: CPT | Performed by: STUDENT IN AN ORGANIZED HEALTH CARE EDUCATION/TRAINING PROGRAM

## 2024-12-06 PROCEDURE — 99284 EMERGENCY DEPT VISIT MOD MDM: CPT | Performed by: STUDENT IN AN ORGANIZED HEALTH CARE EDUCATION/TRAINING PROGRAM

## 2024-12-06 PROCEDURE — 250N000013 HC RX MED GY IP 250 OP 250 PS 637: Performed by: STUDENT IN AN ORGANIZED HEALTH CARE EDUCATION/TRAINING PROGRAM

## 2024-12-06 RX ORDER — OLANZAPINE 5 MG/1
5 TABLET, ORALLY DISINTEGRATING ORAL ONCE
Status: COMPLETED | OUTPATIENT
Start: 2024-12-06 | End: 2024-12-06

## 2024-12-06 RX ADMIN — OLANZAPINE 5 MG: 5 TABLET, ORALLY DISINTEGRATING ORAL at 10:24

## 2024-12-06 ASSESSMENT — ACTIVITIES OF DAILY LIVING (ADL)
ADLS_ACUITY_SCORE: 55
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
## 2024-12-06 NOTE — DISCHARGE INSTRUCTIONS
You were seen in the emergency department due to ongoing concerns with housing and mental health.  You do not need to stay in the hospital for these today.  We would recommend that you call your mother and see if you are able to go back to her home.  If you are unable to, there are multiple public facilities to spend time and during the day and including libraries to stay warm.  You can go back to your previously noted shelter again tonight if need be

## 2024-12-06 NOTE — ED TRIAGE NOTES
Triage Assessment (Adult)       Row Name 12/06/24 1014          Triage Assessment    Airway WDL WDL        Respiratory WDL    Respiratory WDL WDL        Skin Circulation/Temperature WDL    Skin Circulation/Temperature WDL WDL        Cardiac WDL    Cardiac WDL WDL        Peripheral/Neurovascular WDL    Peripheral Neurovascular WDL WDL        Cognitive/Neuro/Behavioral WDL    Cognitive/Neuro/Behavioral WDL mood/behavior     Mood/Behavior other (see comments)  fearful
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
## 2024-12-08 ENCOUNTER — TELEPHONE (OUTPATIENT)
Dept: BEHAVIORAL HEALTH | Facility: CLINIC | Age: 24
End: 2024-12-08
Payer: MEDICARE

## 2024-12-08 NOTE — TELEPHONE ENCOUNTER
Triage and Transition Services- Patient Follow Up Call  Service Line Making Phone Call: Extended Care    Who did Writer Talk to: Patient    Details of Call: Unable to LVM due to patient's phone number on file has calling restrictions. No other phone number for patient was available in [facility] at time of call. No further follow-up is needed.     Asia Ortiz 12/8/2024 1:47 PM

## 2024-12-09 ENCOUNTER — HOSPITAL ENCOUNTER (EMERGENCY)
Facility: CLINIC | Age: 24
Discharge: HOME | End: 2024-12-10
Attending: INTERNAL MEDICINE | Admitting: INTERNAL MEDICINE
Payer: COMMERCIAL

## 2024-12-09 DIAGNOSIS — T74.21XA SEXUAL ASSAULT OF ADULT, INITIAL ENCOUNTER: ICD-10-CM

## 2024-12-09 DIAGNOSIS — F60.3 BORDERLINE PERSONALITY DISORDER (H): ICD-10-CM

## 2024-12-09 PROCEDURE — 250N000013 HC RX MED GY IP 250 OP 250 PS 637: Performed by: INTERNAL MEDICINE

## 2024-12-09 PROCEDURE — 99284 EMERGENCY DEPT VISIT MOD MDM: CPT | Performed by: INTERNAL MEDICINE

## 2024-12-09 PROCEDURE — 99283 EMERGENCY DEPT VISIT LOW MDM: CPT | Performed by: INTERNAL MEDICINE

## 2024-12-09 RX ORDER — ACETAMINOPHEN 325 MG/1
975 TABLET ORAL ONCE
Status: COMPLETED | OUTPATIENT
Start: 2024-12-09 | End: 2024-12-09

## 2024-12-09 RX ADMIN — ACETAMINOPHEN 975 MG: 325 TABLET, FILM COATED ORAL at 22:38

## 2024-12-09 ASSESSMENT — ACTIVITIES OF DAILY LIVING (ADL)
ADLS_ACUITY_SCORE: 55
ADLS_ACUITY_SCORE: 55

## 2024-12-10 VITALS
DIASTOLIC BLOOD PRESSURE: 77 MMHG | RESPIRATION RATE: 17 BRPM | HEART RATE: 75 BPM | TEMPERATURE: 98.4 F | BODY MASS INDEX: 43.59 KG/M2 | SYSTOLIC BLOOD PRESSURE: 125 MMHG | HEIGHT: 63 IN | WEIGHT: 246 LBS | OXYGEN SATURATION: 97 %

## 2024-12-10 PROCEDURE — 250N000013 HC RX MED GY IP 250 OP 250 PS 637: Performed by: EMERGENCY MEDICINE

## 2024-12-10 PROCEDURE — 250N000011 HC RX IP 250 OP 636: Performed by: EMERGENCY MEDICINE

## 2024-12-10 RX ORDER — ACETAMINOPHEN 325 MG/1
975 TABLET ORAL ONCE
Status: COMPLETED | OUTPATIENT
Start: 2024-12-10 | End: 2024-12-10

## 2024-12-10 RX ORDER — DICYCLOMINE HYDROCHLORIDE 10 MG/1
20 CAPSULE ORAL 2 TIMES DAILY
Status: DISCONTINUED | OUTPATIENT
Start: 2024-12-10 | End: 2024-12-10 | Stop reason: HOSPADM

## 2024-12-10 RX ORDER — LEVOTHYROXINE SODIUM 25 UG/1
25 TABLET ORAL DAILY
Status: DISCONTINUED | OUTPATIENT
Start: 2024-12-10 | End: 2024-12-10 | Stop reason: HOSPADM

## 2024-12-10 RX ORDER — LEVOTHYROXINE SODIUM 25 UG/1
25 TABLET ORAL
Status: DISCONTINUED | OUTPATIENT
Start: 2024-12-11 | End: 2024-12-10

## 2024-12-10 RX ORDER — IBUPROFEN 600 MG/1
600 TABLET, FILM COATED ORAL ONCE
Status: COMPLETED | OUTPATIENT
Start: 2024-12-10 | End: 2024-12-10

## 2024-12-10 RX ORDER — ONDANSETRON 4 MG/1
4 TABLET, ORALLY DISINTEGRATING ORAL ONCE
Status: COMPLETED | OUTPATIENT
Start: 2024-12-10 | End: 2024-12-10

## 2024-12-10 RX ORDER — BENZTROPINE MESYLATE 1 MG/1
1 TABLET ORAL 2 TIMES DAILY
Status: DISCONTINUED | OUTPATIENT
Start: 2024-12-10 | End: 2024-12-10 | Stop reason: HOSPADM

## 2024-12-10 RX ADMIN — FLUOXETINE HYDROCHLORIDE 40 MG: 20 CAPSULE ORAL at 10:59

## 2024-12-10 RX ADMIN — ONDANSETRON 4 MG: 4 TABLET, ORALLY DISINTEGRATING ORAL at 17:12

## 2024-12-10 RX ADMIN — DICYCLOMINE HYDROCHLORIDE 20 MG: 10 CAPSULE ORAL at 10:59

## 2024-12-10 RX ADMIN — BENZTROPINE MESYLATE 1 MG: 1 TABLET ORAL at 10:59

## 2024-12-10 RX ADMIN — ACETAMINOPHEN 975 MG: 325 TABLET, FILM COATED ORAL at 13:51

## 2024-12-10 RX ADMIN — IBUPROFEN 600 MG: 600 TABLET, FILM COATED ORAL at 10:59

## 2024-12-10 RX ADMIN — LEVOTHYROXINE SODIUM 25 MCG: 25 TABLET ORAL at 10:59

## 2024-12-10 ASSESSMENT — ACTIVITIES OF DAILY LIVING (ADL)
ADLS_ACUITY_SCORE: 55

## 2024-12-10 NOTE — ED NOTES
Writer spoke to ALIDA Ayon and gave report. Nany reports either her or a nurse named Radha will be here within the hour to do an exam.
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
## 2024-12-10 NOTE — PLAN OF CARE
Sadaf Ross  December 10, 2024  Plan of Care Hand-off Note     Patient Care Path: discharge    Plan for Care:   After therapeutic assessment, intervention and aftercare planning by ED care team and LM and in consultation with attending provider, the patient's circumstances and mental state were appropriate for outpatient management. It is the recommendation of this clinician that pt discharge with OP MH support. A this time the pt is not presenting as an acute risk to self or others due to the following factors: Patient denied SI, NSSI, and HI.  Pt denied psychosis and monae.  Patient was calm and cooperative.  She wanted to go back to [facility] shelter, but there was no way to reach them until after 8 am.  Pt does not have anywhere else to go, so she will need assistance getting back to [facility] or another shelter after 8 am.  Pt's mother will not take her back. Pt was encouraged to try [facility] back, as well.  She has the following appts:  [facility] worker on 12/12/24, Med management on 12/19/24, Therapy on 12/18/24.  She'll work on contacting her  for another group home placement.  She has a referral to [facility].    Identified Goals and Safety Issues: Shelter placement.  Pt prefers to start with [facility] shelter.    Overview:  [facility] [phone]  will not accept pt back and pt has nowhere else to go but to a shelter. Susy [place] , [phone]          Legal Status: Legal Status at Admission: Voluntary/Patient has signed consent for treatment    Psychiatry Consult:  No       Updated   regarding plan of care.           Chloe Goins, [facility]        Spoke to [place].  Let her know that her prescription did not change.

## 2024-12-10 NOTE — ED PROVIDER NOTES
The pt was accepted at shift change sign out pending DEC assessment and UA. Per , the pt has been discharged from her GH and kicked out of her mother's house. She's been staying at [facility] shelter. Pt denies SI/HI, no psychosis or monae.  is trying to help find a place to stay, and cannot reach any shelters. She will be signed out at shift change pending shelter  or other placement. Reportedly they are in the works of trying to place her in a new .      Lynne Nieves MD  12/10/24 7738
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
## 2024-12-10 NOTE — DISCHARGE INSTRUCTIONS
You reserved a shelter bed for tonight 12/10 at:  [facility]: [address] [place] [address]. Enter through the glass doors.   You need to arrive between 7-10pm.     Your upcoming appointments:  [facility] worker on 12/12/24  Med management on 12/19/24  Therapy on 12/18/24.     Please follow-up with your : Lesley Perez ([phone])
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
## 2024-12-11 ENCOUNTER — TELEPHONE (OUTPATIENT)
Dept: BEHAVIORAL HEALTH | Facility: CLINIC | Age: 24
End: 2024-12-11
Payer: MEDICARE

## 2024-12-11 NOTE — TELEPHONE ENCOUNTER
Triage and Transition Services- Patient Follow Up Call  Service Line Making Phone Call: Extended Care    Who did Writer Talk to: Patient    Details of Call: Unable to connect as pt's phone has calling restrictions set up.    KIERAN CHINCHILLA 12/11/2024 9:31 AM

## 2024-12-11 NOTE — ED NOTES
"Pt's sister arrived to ED to pick pt up. Pt refused VS and medications saying pt said \"I need to leave right now, my sister told me not to take too long\". All belongings returned to pt at discharge. All pt's questions answered at discharge.  "

## 2025-02-21 ENCOUNTER — HOSPITAL ENCOUNTER (OUTPATIENT)
Facility: CLINIC | Age: 25
Setting detail: OBSERVATION
Discharge: HOME | End: 2025-02-22
Attending: EMERGENCY MEDICINE | Admitting: EMERGENCY MEDICINE
Payer: MEDICARE

## 2025-02-21 VITALS
RESPIRATION RATE: 18 BRPM | OXYGEN SATURATION: 99 % | SYSTOLIC BLOOD PRESSURE: 122 MMHG | HEART RATE: 66 BPM | DIASTOLIC BLOOD PRESSURE: 76 MMHG | TEMPERATURE: 97.9 F

## 2025-02-21 DIAGNOSIS — Z91.148 PAIN MEDICATION AGREEMENT BROKEN: Primary | ICD-10-CM

## 2025-02-21 DIAGNOSIS — R45.851 SUICIDAL IDEATIONS: ICD-10-CM

## 2025-02-21 PROCEDURE — 93010 ELECTROCARDIOGRAM REPORT: CPT | Performed by: EMERGENCY MEDICINE

## 2025-02-21 PROCEDURE — 87637 SARSCOV2&INF A&B&RSV AMP PRB: CPT | Performed by: EMERGENCY MEDICINE

## 2025-02-21 PROCEDURE — 93005 ELECTROCARDIOGRAM TRACING: CPT

## 2025-02-21 PROCEDURE — 99285 EMERGENCY DEPT VISIT HI MDM: CPT | Performed by: EMERGENCY MEDICINE

## 2025-02-21 PROCEDURE — 99285 EMERGENCY DEPT VISIT HI MDM: CPT | Mod: 25 | Performed by: EMERGENCY MEDICINE

## 2025-02-21 RX ORDER — ACETAMINOPHEN 325 MG/1
650 TABLET ORAL EVERY 4 HOURS PRN
Status: DISCONTINUED | OUTPATIENT
Start: 2025-02-21 | End: 2025-02-22

## 2025-02-21 ASSESSMENT — COLUMBIA-SUICIDE SEVERITY RATING SCALE - C-SSRS
4. HAVE YOU HAD THESE THOUGHTS AND HAD SOME INTENTION OF ACTING ON THEM?: NO
6. HAVE YOU EVER DONE ANYTHING, STARTED TO DO ANYTHING, OR PREPARED TO DO ANYTHING TO END YOUR LIFE?: YES
2. HAVE YOU ACTUALLY HAD ANY THOUGHTS OF KILLING YOURSELF IN THE PAST MONTH?: YES
1. IN THE PAST MONTH, HAVE YOU WISHED YOU WERE DEAD OR WISHED YOU COULD GO TO SLEEP AND NOT WAKE UP?: YES
3. HAVE YOU BEEN THINKING ABOUT HOW YOU MIGHT KILL YOURSELF?: YES
5. HAVE YOU STARTED TO WORK OUT OR WORKED OUT THE DETAILS OF HOW TO KILL YOURSELF? DO YOU INTEND TO CARRY OUT THIS PLAN?: YES

## 2025-02-21 ASSESSMENT — ACTIVITIES OF DAILY LIVING (ADL)
ADLS_ACUITY_SCORE: 55
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
## 2025-02-21 NOTE — ED PROVIDER NOTES
ED Provider Note  [facility]      History     Chief Complaint   Patient presents with    Suicidal     HPI  Sadaf Ross is a 24 year old female with history of schizoaffective disorder, bipolar, chronic suicidal ideation who presents for suicidal ideation.  Patient reports that a few hours ago she developed suicidal ideation.  She had a plan to run into traffic.  She denies any specific events or triggers today that made her feel this way.  She has a history of chronic suicidal ideation.  She frequently presents to the ED here and other ED's for suicidal thoughts.  She does have a care plan in place related to this.    Patient states that she has had some congestion, rhinorrhea though otherwise feels well.  She has not had issues with vomiting.  She notes that the bilateral soles of her feet have been painful and that she has been walking a lot.  She has been staying in shelters recently.        She has not been taking her regular medications.  She is not sure when she last took them.  She states that she ran out of them.        Past Medical History  Past Medical History:   Diagnosis Date    ADHD (attention deficit hyperactivity disorder)     Bipolar 1 disorder (H)     Borderline personality disorder (H)     Depressive disorder     Intellectual disability     Obesity     Syncope      Past Surgical History:   Procedure Laterality Date    APPENDECTOMY       acetaminophen (TYLENOL) 325 MG tablet  albuterol (PROAIR HFA/PROVENTIL HFA/VENTOLIN HFA) 108 (90 Base) MCG/ACT inhaler  ARIPiprazole lauroxil ER (ARISTADA) 882 MG/3.2ML intra-muscular  benztropine (COGENTIN) 1 MG tablet  cetirizine (ZYRTEC) 10 MG tablet  cloNIDine (CATAPRES) 0.1 MG tablet  clotrimazole (LOTRIMIN) 1 % external cream  dicyclomine (BENTYL) 20 MG tablet  divalproex sodium extended-release (DEPAKOTE ER) 500 MG 24 hr tablet  docusate sodium (COLACE) 50 MG capsule  FLUoxetine (PROZAC) 40 MG capsule  hydrOXYzine HCl  (ATARAX) 25 MG tablet  lactase (LACTAID) 3000 UNIT tablet  levothyroxine (SYNTHROID/LEVOTHROID) 25 MCG tablet  multivitamin w/minerals (MULTI-VITAMIN) tablet  OLANZapine (ZYPREXA) 5 MG tablet  OLANZapine (ZYPREXA) 7.5 MG tablet  ondansetron (ZOFRAN) 8 MG tablet  pantoprazole (PROTONIX) 40 MG EC tablet  polyethylene glycol (MIRALAX) 17 GM/Dose powder  promethazine (PHENERGAN) 12.5 MG tablet  senna-docusate (SENOKOT-S/PERICOLACE) 8.6-50 MG tablet  sodium chloride 0.65 % nasal spray  traZODone (DESYREL) 100 MG tablet  Vitamin D, Cholecalciferol, 25 MCG (1000 UT) TABS      Allergies   Allergen Reactions    Penicillins Rash and Unknown     Family History  Family History   Problem Relation Age of Onset    Diabetes Type 1 Father     Cancer Paternal Grandfather      Social History   Social History     Tobacco Use    Smoking status: Former     Current packs/day: 0.25     Average packs/day: 0.3 packs/day for 5.0 years (1.3 ttl pk-yrs)     Types: Cigarettes, Vaping Device     Passive exposure: Past    Smokeless tobacco: Never   Substance Use Topics    Alcohol use: No    Drug use: Never      A medically appropriate review of systems was performed with pertinent positives and negatives noted in the HPI, and all other systems negative.    Physical Exam   BP: 122/76  Pulse: 66  Temp: 97.9  F (36.6  C)  Resp: 18  SpO2: 99 %  Physical Exam  Constitutional:       General: She is not in acute distress.     Appearance: She is not toxic-appearing.   HENT:      Head: Normocephalic and atraumatic.      Nose: Nose normal.      Mouth/Throat:      Mouth: Mucous membranes are moist.      Pharynx: Oropharynx is clear.   Eyes:      Conjunctiva/sclera: Conjunctivae normal.      Pupils: Pupils are equal, round, and reactive to light.   Cardiovascular:      Rate and Rhythm: Normal rate and regular rhythm.      Heart sounds: No murmur heard.  Pulmonary:      Effort: Pulmonary effort is normal. No respiratory distress.      Breath sounds: No  wheezing or rales.   Musculoskeletal:         General: Normal range of motion.      Comments: The feet are non tender, they are warm and well perfused, sensation intact. No wounds or injuries seen   Skin:     General: Skin is warm and dry.   Neurological:      General: No focal deficit present.      Mental Status: She is alert and oriented to person, place, and time.           ED Course, Procedures, & Data      Procedures            EKG Interpretation:      Interpreted by Morgan Vidal MD  Time reviewed: 5:45  Symptoms at time of EKG: malaise   Rhythm: normal sinus   Rate: bradycardia  Axis: normal  Ectopy: none  Conduction: normal  ST Segments/ T Waves: No ST-T wave changes      Clinical Impression: normal EKG           Results for orders placed or performed during the hospital encounter of 02/21/25   Influenza A/B, RSV and SARS-CoV2 PCR (COVID-19) Nasopharyngeal     Status: Normal    Specimen: Nasopharyngeal; Swab   Result Value Ref Range    Influenza A PCR Negative Negative    Influenza B PCR Negative Negative    RSV PCR Negative Negative    SARS CoV2 PCR Negative Negative    Narrative    Testing was performed using the Xpert Xpress CoV2/Flu/RSV Assay on the [employer] GeneXpert Instrument. This test should be ordered for the detection of SARS-CoV2, influenza, and RSV viruses in individuals with signs and symptoms of respiratory tract infection. This test is for in vitro diagnostic use under the [place] FDA for laboratories certified under CLIA to perform high or moderate complexity testing. This test has been [place] FDA cleared. A negative result does not rule out the presence of PCR inhibitors in the specimen or target RNA in concentration below the limit of detection for the assay. If only one viral target is positive but coinfection with multiple targets is suspected, the sample should be re-tested with another FDA cleared, approved, or authorized test, if coninfection would change clinical management. This test was  validated by the [facility]. These laboratories are certified under the Clinical Laboratory Improvement Amendments of 1988 (CLIA-88) as qualified to perfom high complexity laboratory testing.     Medications   acetaminophen (TYLENOL) tablet 650 mg (has no administration in time range)     Labs Ordered and Resulted from Time of ED Arrival to Time of ED Departure   INFLUENZA A/B, RSV AND SARS-COV2 PCR - Normal       Result Value    Influenza A PCR Negative      Influenza B PCR Negative      RSV PCR Negative      SARS CoV2 PCR Negative     HCG QUALITATIVE URINE     No orders to display          Critical care was not performed.     Medical Decision Making  The patient's presentation was of high complexity (a chronic illness severe exacerbation, progression, or side effect of treatment).    The patient's evaluation involved:  review of external note(s) from 3+ sources (ED note, clinic note, care plan)  review of 3+ test result(s) ordered prior to this encounter (CBC, BMP, UDS)  strong consideration of a test (see separate area of note for details) that was ultimately deferred  ordering and/or review of 3+ test(s) in this encounter (see separate area of note for details)      The patient's management necessitated further care after sign-out to Dr. Manriquez (see their note for further management).    Assessment & Plan    This is a 24-year-old female with history of bipolar disorder, borderline personality disorder here with suicidal thoughts.  Per review she does frequently present to the ED for suicidal thoughts and has a care plan in place.  She has not consistently been taking her medications the patient states.  She also has housing insecurity she did present with suicidal thoughts and plan to run into traffic.    She noted some pain to the soles of her feet. No trauma or injury and exam was reassuring as well. No sign or symptom to suggest cellulitis or other infection. Given reassuring exam and lack  of tenderness imaging was deferred. Symptoms more likely related to overuse/lack of appropriate shoes in the setting of housing insecurity and the patient states she has been walking more than typical.      A DEC assessment had been requested.  The patient was too sleepy to participate in this.  She was signed out pending DEC assessment.    I have reviewed the nursing notes. I have reviewed the findings, diagnosis, plan and need for follow up with the patient.    New Prescriptions    No medications on file       Final diagnoses:   None       Morgan Vidal  [facility] EMERGENCY DEPARTMENT  2/21/2025     Morgan Vidal MD  02/22/25 0105

## 2025-02-21 NOTE — ED TRIAGE NOTES
Triage Assessment (Adult)       Row Name 02/21/25 1700          Triage Assessment    Airway WDL WDL        Respiratory WDL    Respiratory WDL WDL        Skin Circulation/Temperature WDL    Skin Circulation/Temperature WDL WDL        Cardiac WDL    Cardiac WDL WDL        Peripheral/Neurovascular WDL    Peripheral Neurovascular WDL WDL        Cognitive/Neuro/Behavioral WDL    Cognitive/Neuro/Behavioral WDL WDL
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
## 2025-02-22 ENCOUNTER — HOSPITAL ENCOUNTER (OUTPATIENT)
Facility: CLINIC | Age: 25
Setting detail: OBSERVATION
Discharge: HOME | End: 2025-02-24
Attending: STUDENT IN AN ORGANIZED HEALTH CARE EDUCATION/TRAINING PROGRAM | Admitting: STUDENT IN AN ORGANIZED HEALTH CARE EDUCATION/TRAINING PROGRAM
Payer: MEDICARE

## 2025-02-22 DIAGNOSIS — R45.851 SUICIDAL IDEATION: ICD-10-CM

## 2025-02-22 PROBLEM — F41.1 GAD (GENERALIZED ANXIETY DISORDER): Status: ACTIVE | Noted: 2024-12-04

## 2025-02-22 PROBLEM — F31.9 BIPOLAR DISORDER (H): Status: ACTIVE | Noted: 2025-02-22

## 2025-02-22 PROBLEM — F43.10 PTSD (POST-TRAUMATIC STRESS DISORDER): Status: ACTIVE | Noted: 2024-11-25

## 2025-02-22 PROBLEM — F90.9 ADHD (ATTENTION DEFICIT HYPERACTIVITY DISORDER): Status: ACTIVE | Noted: 2025-02-22

## 2025-02-22 PROCEDURE — 99285 EMERGENCY DEPT VISIT HI MDM: CPT | Performed by: STUDENT IN AN ORGANIZED HEALTH CARE EDUCATION/TRAINING PROGRAM

## 2025-02-22 PROCEDURE — 250N000013 HC RX MED GY IP 250 OP 250 PS 637: Performed by: EMERGENCY MEDICINE

## 2025-02-22 PROCEDURE — G0378 HOSPITAL OBSERVATION PER HR: HCPCS

## 2025-02-22 RX ORDER — ACETAMINOPHEN 325 MG/1
650 TABLET ORAL EVERY 4 HOURS PRN
Status: DISCONTINUED | OUTPATIENT
Start: 2025-02-22 | End: 2025-02-22 | Stop reason: HOSPADM

## 2025-02-22 RX ORDER — DIVALPROEX SODIUM 500 MG/1
500 TABLET, FILM COATED, EXTENDED RELEASE ORAL AT BEDTIME
Status: DISCONTINUED | OUTPATIENT
Start: 2025-02-22 | End: 2025-02-22 | Stop reason: HOSPADM

## 2025-02-22 RX ORDER — HYDROXYZINE HYDROCHLORIDE 25 MG/1
25 TABLET, FILM COATED ORAL EVERY 4 HOURS PRN
Status: DISCONTINUED | OUTPATIENT
Start: 2025-02-22 | End: 2025-02-23

## 2025-02-22 RX ORDER — LEVOTHYROXINE SODIUM 25 UG/1
25 TABLET ORAL DAILY
Status: DISCONTINUED | OUTPATIENT
Start: 2025-02-22 | End: 2025-02-22 | Stop reason: HOSPADM

## 2025-02-22 RX ORDER — CLONIDINE HYDROCHLORIDE 0.1 MG/1
0.1 TABLET ORAL AT BEDTIME
Status: DISCONTINUED | OUTPATIENT
Start: 2025-02-22 | End: 2025-02-22 | Stop reason: HOSPADM

## 2025-02-22 RX ORDER — HYDROXYZINE HYDROCHLORIDE 25 MG/1
25 TABLET, FILM COATED ORAL EVERY 4 HOURS PRN
Status: DISCONTINUED | OUTPATIENT
Start: 2025-02-22 | End: 2025-02-22 | Stop reason: HOSPADM

## 2025-02-22 RX ORDER — IBUPROFEN 600 MG/1
600 TABLET, FILM COATED ORAL EVERY 6 HOURS PRN
Status: DISCONTINUED | OUTPATIENT
Start: 2025-02-22 | End: 2025-02-22 | Stop reason: HOSPADM

## 2025-02-22 RX ORDER — LOPERAMIDE HYDROCHLORIDE 2 MG/1
2 CAPSULE ORAL 4 TIMES DAILY PRN
Status: DISCONTINUED | OUTPATIENT
Start: 2025-02-22 | End: 2025-02-24 | Stop reason: HOSPADM

## 2025-02-22 RX ADMIN — HYDROXYZINE HYDROCHLORIDE 25 MG: 25 TABLET, FILM COATED ORAL at 07:10

## 2025-02-22 RX ADMIN — LOPERAMIDE HYDROCHLORIDE 2 MG: 2 CAPSULE ORAL at 23:00

## 2025-02-22 ASSESSMENT — COLUMBIA-SUICIDE SEVERITY RATING SCALE - C-SSRS
2. HAVE YOU ACTUALLY HAD ANY THOUGHTS OF KILLING YOURSELF IN THE PAST MONTH?: YES
5. HAVE YOU STARTED TO WORK OUT OR WORKED OUT THE DETAILS OF HOW TO KILL YOURSELF? DO YOU INTEND TO CARRY OUT THIS PLAN?: YES
2. HAVE YOU ACTUALLY HAD ANY THOUGHTS OF KILLING YOURSELF?: NO
4. HAVE YOU HAD THESE THOUGHTS AND HAD SOME INTENTION OF ACTING ON THEM?: YES
TOTAL  NUMBER OF INTERRUPTED ATTEMPTS SINCE LAST CONTACT: NO
1. SINCE LAST CONTACT, HAVE YOU WISHED YOU WERE DEAD OR WISHED YOU COULD GO TO SLEEP AND NOT WAKE UP?: NO
SUICIDE, SINCE LAST CONTACT: NO
3. HAVE YOU BEEN THINKING ABOUT HOW YOU MIGHT KILL YOURSELF?: YES
ATTEMPT SINCE LAST CONTACT: NO
1. IN THE PAST MONTH, HAVE YOU WISHED YOU WERE DEAD OR WISHED YOU COULD GO TO SLEEP AND NOT WAKE UP?: YES
6. HAVE YOU EVER DONE ANYTHING, STARTED TO DO ANYTHING, OR PREPARED TO DO ANYTHING TO END YOUR LIFE?: NO
TOTAL  NUMBER OF ABORTED OR SELF INTERRUPTED ATTEMPTS SINCE LAST CONTACT: NO

## 2025-02-22 ASSESSMENT — ACTIVITIES OF DAILY LIVING (ADL)
ADLS_ACUITY_SCORE: 55
ADLS_ACUITY_SCORE: 59
ADLS_ACUITY_SCORE: 55
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
## 2025-02-22 NOTE — ED PROVIDER NOTES
ED Observation Discharge Summary  [facility]  Discharge Date: 2/22/2025    Sadaf Ross MRN: [id number]   Age: 24 year old YOB: 2000     Brief HPI & Initial ED Course     Chief Complaint   Patient presents with    Suicidal     HPI  Sadaf Ross is a 24 year old female with PMH notable for schizoaffective disorder, bipolard isorder, SI who presented to the ED with a psychiatric concern. .      The patient was evaluated in the emergency department by a physician and DEC behavioral health . The DEC  recommended discharge. See separate DEC note from this encounter for details on the assessment. The patient's psychiatric state was such that she would benefit from ongoing monitoring. Observation care was initiated with the plan including serial assessments of psychiatric condition, potential administration of medications if indicated, and further disposition pending the patient's psychiatric course during the monitoring period.     See ED Observation H&P for further details on the patient's presenting history and initial evaluation.     Physical Exam   BP: 122/76  Pulse: 66  Temp: 97.9  F (36.6  C)  Resp: 18  SpO2: 99 %    Physical Exam  General: . Appears stated age.   HENT: MMM, no oropharyngeal lesions  Eyes: PERRL, normal sclerae   Cardio: Regular rate, extremities well perfused  Resp: Normal work of breathing, normal respiratory rate  Neuro: alert and fully oriented. CN II-XII grossly intact. Grossly normal strength and sensation in all extremities.   MSK: no deformities.   Integumentary/Skin: no rash visualized, normal color  Psych: normal affect, normal behavior. no SI. no HI. no hallucinations.     Results      Procedures              Labs Ordered and Resulted from Time of ED Arrival to Time of ED Departure   INFLUENZA A/B, RSV AND SARS-COV2 PCR - Normal       Result Value    Influenza A PCR Negative      Influenza B PCR Negative      RSV PCR  Negative      SARS CoV2 PCR Negative     HCG QUALITATIVE URINE            Observation Course   The patient was found to have a psychiatric condition that would benefit from an observation stay in the emergency department for further psychiatric stabilization and/or coordination of a safe disposition. The plan upon observation admission included serial assessments of psychiatric condition, potential administration of medications if indicated, further disposition pending the patient's psychiatric course during the monitoring period.     Serial assessments of the patient's psychiatric condition were performed. Nursing notes were reviewed. During the observation period, the patient did not require medications for agitation, and did not require restraints/seclusion for patient and/or provider safety.        After the period in observation care, the patient's circumstances and mental state were safe for outpatient management. After counseling on the diagnosis, work-up, and treatment plan, the patient was discharged. Close follow-up with a psychiatrist and/or therapist was recommended and community psychiatric resources were provided. Patient is to return to the ED if any urgent or potentially life-threatening concerns.      Discharge Diagnoses:   Final diagnoses:   Suicidal ideations       --  Sourav Nuñez MD  [facility] EMERGENCY DEPARTMENT  2/22/2025      Sourav Nuñez MD  02/22/25 0900

## 2025-02-22 NOTE — CONSULTS
"Diagnostic Evaluation Consultation  Crisis Assessment    Patient Name: Sadaf Ross  Age:  24 year old  Legal Sex: female  Gender Identity: female  Pronouns:   Race: White  Ethnicity: Not  or   Language: English      Patient was assessed: In person   Crisis Assessment Start Date: 02/22/25  Crisis Assessment Start Time: 0400  Crisis Assessment Stop Time: 0416  Patient location: [facility] Emergency Department                             [facility]    Referral Data and Chief Complaint  Sadaf Ross presents to the ED via EMS. Patient is presenting to the ED for the following concerns: Suicidal ideation. Factors that make the mental health crisis life threatening or complex are: PT presented to [facility] ED via EMS due to SI concerns.  PT reported wanting to \"kill myself\" via running into traffic. Pt reports that she started having SI today and has little insight into what triggered her today. Pt continues to endorse SI with plans and intent as well command AH/VH that emcourging her to hurt herself. She aslo endorses NSSI via bitting self. PT reports that she will commit suicide if she does not get help which is why she called 911 and was emmanuel in. PT reports that she has been off her medication the past week and need a refrill. Pt is open to staying the night..      Informed Consent and Assessment Methods  Explained the crisis assessment process, including applicable information disclosures and limits to confidentiality, assessed understanding of the process, and obtained consent to proceed with the assessment.  Assessment methods included conducting a formal interview with patient, review of medical records, collaboration with medical staff, and obtaining relevant collateral information from family and community providers when available.  : done     History of the Crisis   PT has Hx of past diagnoses of Bipolar, BPD, ID, PTSD , RAMON, and ADHD.  PT has Hx of SI with 9 prior attempts and " NSSI via biting.  PT reports Hx with command hallucinations that encouraged her to harm herself.  PT also endorses Hx of HI towards people that she knows however at the morning is not endorsing any HI.  PT reports that she is currently not seeing a therapist or psychiatrist and has not seen one since September 2024.  PT reports that she has been bouncing around different shelters since she has been kicked out of her mother's house back in September.  PT reports that Hx of multiple mental health inpatient with the latest 1 being sometime last year.  PT reports no Hx with programmatic care.    Brief Psychosocial History  Family:  Single, Children no  Support System:  Parent(s)  Employment Status:  disabled  Source of Income:  disability  Financial Environmental Concerns:  unable to afford rent/mortgage  Current Hobbies:  music  Barriers in Personal Life:  emotional concerns, mental health concerns    Significant Clinical History  Current Anxiety Symptoms:   (none reported)  Current Depression/Trauma:  thoughts of death/suicide, sadness, hopelessness, helplessness, irritable, impaired decision making, withdrawl/isolation, sense of doom, crying or feels like crying, negativistic  Current Somatic Symptoms:   (none reported)  Current Psychosis/Thought Disturbance:  auditory hallucinations, visual hallucinations  Current Eating Symptoms:   (none reported)  Chemical Use History:  Alcohol: None  Benzodiazepines: None  Opiates: None  Cocaine: None  Marijuana: None  Other Use: None  Withdrawal Symptoms:  (n/a)  Addictions:  (none reported)   Past diagnosis:  ADHD, Anxiety Disorder, Bipolar Disorder, Depression, Personality Disorder, Suicide attempt(s)  Family history:  No known history of mental health or chemical health concerns  Past treatment:  Individual therapy, Psychiatric Medication Management, Case management, ARMHS/CTSS, Inpatient Hospitalization, Primary Care, Supportive Living Environment (group home, prison house,  "etc)  Details of most recent treatment:     Other relevant history:       Have there been any medication changes in the past two weeks:  no       Is the patient compliant with medications:  no (Pt reports being off medication due to not refrilling for the past week)        Collateral Information  Is there collateral information: No (Pt refused for collateral to be taken)          Risk Assessment  [place] Suicide Severity Rating Scale Full Clinical Version:  Suicidal Ideation  Q1 Wish to be Dead (Lifetime): Yes  Q2 Non-Specific Active Suicidal Thoughts (Lifetime): Yes  3. Active Suicidal Ideation with any Methods (Not Plan) Without Intent to Act (Lifetime): Yes  4. Active Suicidal Ideation with Some Intent to Act, Without Specific Plan (Lifetime): Yes  5. Active Suicidal Ideation with Specific Plan and Intent (Lifetime): Yes  Q6 Suicide Behavior (Lifetime): yes  Intensity of Ideation (Lifetime)  Most Severe Ideation Rating (Lifetime): 5  Description of Most Severe Ideation (Lifetime): \"I want to kill myself\"  Frequency (Lifetime): Daily or almost daily  Duration (Lifetime): More than 8 hours/persistent or continuous  Controllability (Lifetime): Unable to control thoughts  Deterrents (Lifetime): Deterrents definitely did not stop you  Reasons for Ideation (Lifetime): Mostly to end or stop the pain (You couldn't go on living with the pain or how you were feeling)  Suicidal Behavior (Lifetime)  Actual Attempt (Lifetime): Yes  Total Number of Actual Attempts (Lifetime): 9  Actual Attempt Description (Lifetime): Via cutting and runing into traffic  Has subject engaged in non-suicidal self-injurious behavior? (Lifetime): Yes (Via biting)  Interrupted Attempts (Lifetime): No  Aborted or Self-Interrupted Attempt (Lifetime): No  Preparatory Acts or Behavior (Lifetime): No    [place] Suicide Severity Rating Scale Recent:   Suicidal Ideation (Recent)  Q1 Wished to be Dead (Past Month): yes  Q2 Suicidal Thoughts (Past Month): " "yes  Q3 Suicidal Thought Method: yes  Q4 Suicidal Intent without Specific Plan: yes  Q5 Suicide Intent with Specific Plan: yes  If yes to Q6, within past 3 months?: yes  Level of Risk per Screen: high risk  Intensity of Ideation (Recent)  Most Severe Ideation Rating (Past 1 Month): 5  Description of Most Severe Ideation (Past 1 Month): \"I want to kill myself\"  Duration (Past 1 Month): More than 8 hours/persistent or continuous  Controllability (Past 1 Month): Unable to control thoughts  Deterrents (Past 1 Month): Deterrents definitely did not stop you  Reasons for Ideation (Past 1 Month): Mostly to end or stop the pain (You couldn't go on living with the pain or how you were feeling)  Suicidal Behavior (Recent)  Actual Attempt (Past 3 Months): Yes  Total Number of Actual Attempts (Past 3 Months): 5  Has subject engaged in non-suicidal self-injurious behavior? (Past 3 Months): No  Interrupted Attempts (Past 3 Months): No  Aborted or Self-Interrupted Attempt (Past 3 Months): No  Preparatory Acts or Behavior (Past 3 Months): No    Environmental or Psychosocial Events: loss of a loved one, challenging interpersonal relationships, helplessness/hopelessness, impulsivity/recklessness, unstable housing, homelessness, other life stressors  Protective Factors: Protective Factors: help seeking, able to access care without barriers    Does the patient have thoughts of harming others? Feels Like Hurting Others: no  Previous Attempt to Hurt Others: no  Current presentation:  (calm and engaged)  Is the patient engaging in sexually inappropriate behavior?: no  Does Patient have a known history of aggressive behavior: No    Is the patient engaging in sexually inappropriate behavior?  no        Mental Status Exam   Affect: Flat  Appearance: Disheveled  Attention Span/Concentration: Inattentive  Eye Contact: Avoidant    Fund of Knowledge: Appropriate   Language /Speech Content: Fluent  Language /Speech Volume: Soft  Language /Speech " Rate/Productions: Minimally Responsive  Recent Memory: Variable  Remote Memory: Variable  Mood: Depressed, Sad  Orientation to Person: Yes   Orientation to Place: Yes  Orientation to Time of Day: Yes  Orientation to Date: Yes     Situation (Do they understand why they are here?): Yes  Psychomotor Behavior: Normal  Thought Content: Suicidal, Hallucinations  Thought Form: Goal Directed     Mini-Cog Assessment  Number of Words Recalled:    Clock-Drawing Test:     Three Item Recall:    Mini-Cog Total Score:       Medication  Psychotropic medications:   Medication Orders - Psychiatric (From admission, onward)      Start     Dose/Rate Route Frequency Ordered Stop    02/22/25 0800  FLUoxetine (PROzac) capsule 40 mg         40 mg Oral DAILY 02/22/25 0426      02/22/25 0427  hydrOXYzine HCl (ATARAX) tablet 25 mg         25 mg Oral EVERY 4 HOURS PRN 02/22/25 0427               Current Care Team  Patient Care Team:  [facility] as PCP - General (Clinic)  Chloe Alva APRN CNP as Nurse Practitioner (OB/Gyn)  Seble Jones PA-C as Physician Assistant  Fidel Neely MD as Assigned Heart and Vascular Provider    Diagnosis  Patient Active Problem List   Diagnosis Code    MENTAL HEALTH     Suicidal ideation R45.851    Suicidal ideations R45.851    Intellectual disability F79    Bipolar affective disorder, currently depressed, moderate (H) F31.32    Borderline personality disorder (H) F60.3    Morbid obesity (H) E66.01    Unspecified mood (affective) disorder F39    Chronic constipation K59.09    History of suicide attempt Z91.51    Hyperhidrosis R61    Major depressive disorder, recurrent, in full remission F33.42    Vitamin D deficiency E55.9    Syncope R55    Seizure-like activity (H) R56.9    Syncope, unspecified syncope type R55    Bipolar affective disorder (H) F31.9    Has access to planned means of suicide R45.851    PTSD (post-traumatic stress disorder) F43.10    Suicidal thoughts R45.851    Suicide ideation  R45.851    RAMON (generalized anxiety disorder) F41.1       Primary Problem This Admission  Active Hospital Problems    *Borderline personality disorder (H)      Suicidal ideations        Clinical Summary and Substantiation of Recommendations   Clinical Substantiation:  It is the recommendation of this clinician that pt is admitted for Observation at ED. Pt presented to ED due to SI concerns. Pt endorses continued SI with plans and intent as well as command hallucinations to harm self.  PT endorses NSSI urges via biting.  PT denies current HI and substance use.  PT reports that she has been off of her medications for the past week due to not being able to refill it and may benefit filling this.  With brief observation, monitored therapeutic treatment, and intervention of mental health symptoms in the ED, symptoms may be mitigated with potential for disposition to a less restrictive level of care than an inpatient setting. Patient is not currently on the inpatient worklist.  PT appears to be at baseline SI however may be at risk to self.  PT will benefit from a psych consult for medication overview. Next steps in care include reassessment, safety planning and connecting with outpatient resources if deemed ready to discharge.    Goals for crisis stabilization:  decrease in SI, reassement, and connecting with outpatient services    Next steps for Care Team:  psych consult, reasssement, safety planning and connecting with outpatient services if deemed ready to discharge    Treatment Objectives Addressed:  rapport building, identifying an appropriate aftercare plan, assessing safety, processing feelings    Therapeutic Interventions:  Engaged in cognitive restructuring/ reframing, looked at common cognitive distortions and challenged negative thoughts., Engaged in guided discovery, explored patient's perspectives and helped expand them through socratic dialogue.    Has a specific means been identified for suicidal/homicide  actions: Yes    If yes, describe:  running into traffic    Explain action steps toward mitigation:  Discussed with Pt regarding this and they are endorsing intent with this. per chart review, this appears to be pt's baseline.    Document completion of mitigation actions:  done    The follow up action still needed prior to discharge:  psych consult for medication review/refills, reassesments, safety planing and connecting with outpatient services if deemed ready to discharge    Patient coping skills attempted to reduce the crisis:  sleeping and coming to ED    Disposition  Recommended referrals: Medication Management, Individual Therapy, Crisis Services        Reviewed case and recommendations with attending provider. Attending Name: Kunal Manriquez       Attending concurs with disposition: yes       Patient and/or validated legal guardian concurs with disposition:   yes       Final disposition:  observation                            Legal status: Voluntary/Patient has signed consent for treatment                                                                                                                                 Reviewed court records: yes       Assessment Details   Total duration spent with the patient:       CPT code(s) utilized:      CHERI Addison, Psychotherapist  DEC - Triage & Transition Services  Callback:  [phone]

## 2025-02-22 NOTE — H&P
ED Observation History and Physical  [facility]  Observation Initiation Date: Feb 22, 2025    Sadaf Ross MRN: [id number]   Age: 24 year old YOB: 2000     History     Chief Complaint   Patient presents with    Suicidal     HPI  Sadaf Ross is a 24 year old female with PMH notable for bipolar 1, borderline personality disorder, intellectual disability, depressive disorder who presented to the ED with suicidal ideation.  Patient reporting plan of desire to run into traffic.  For details on initial presentation, please see ED provider note by Dr. Vidal.     Past Medical History  Past Medical History:   Diagnosis Date    ADHD (attention deficit hyperactivity disorder)     Bipolar 1 disorder (H)     Borderline personality disorder (H)     Depressive disorder     Intellectual disability     Obesity     Syncope      Past Surgical History:   Procedure Laterality Date    APPENDECTOMY       acetaminophen (TYLENOL) 325 MG tablet  albuterol (PROAIR HFA/PROVENTIL HFA/VENTOLIN HFA) 108 (90 Base) MCG/ACT inhaler  ARIPiprazole lauroxil ER (ARISTADA) 882 MG/3.2ML intra-muscular  benztropine (COGENTIN) 1 MG tablet  cetirizine (ZYRTEC) 10 MG tablet  cloNIDine (CATAPRES) 0.1 MG tablet  clotrimazole (LOTRIMIN) 1 % external cream  dicyclomine (BENTYL) 20 MG tablet  divalproex sodium extended-release (DEPAKOTE ER) 500 MG 24 hr tablet  docusate sodium (COLACE) 50 MG capsule  FLUoxetine (PROZAC) 40 MG capsule  hydrOXYzine HCl (ATARAX) 25 MG tablet  lactase (LACTAID) 3000 UNIT tablet  levothyroxine (SYNTHROID/LEVOTHROID) 25 MCG tablet  multivitamin w/minerals (MULTI-VITAMIN) tablet  OLANZapine (ZYPREXA) 5 MG tablet  OLANZapine (ZYPREXA) 7.5 MG tablet  ondansetron (ZOFRAN) 8 MG tablet  pantoprazole (PROTONIX) 40 MG EC tablet  polyethylene glycol (MIRALAX) 17 GM/Dose powder  promethazine (PHENERGAN) 12.5 MG tablet  senna-docusate (SENOKOT-S/PERICOLACE) 8.6-50 MG tablet  sodium chloride  0.65 % nasal spray  traZODone (DESYREL) 100 MG tablet  Vitamin D, Cholecalciferol, 25 MCG (1000 UT) TABS      Allergies   Allergen Reactions    Penicillins Rash and Unknown     Family History  Family History   Problem Relation Age of Onset    Diabetes Type 1 Father     Cancer Paternal Grandfather      Social History   Social History     Tobacco Use    Smoking status: Former     Current packs/day: 0.25     Average packs/day: 0.3 packs/day for 5.0 years (1.3 ttl pk-yrs)     Types: Cigarettes, Vaping Device     Passive exposure: Past    Smokeless tobacco: Never   Substance Use Topics    Alcohol use: No    Drug use: Never          Review of Systems  A complete review of systems was performed with pertinent positives and negatives noted in the HPI, and all other systems negative.    Physical Exam   BP: 122/76  Pulse: 66  Temp: 97.9  F (36.6  C)  Resp: 18  SpO2: 99 %    Physical Exam  General: No acute distress. Appears stated age.   HENT: MMM, no oropharyngeal lesions  Eyes: PERRL, normal sclerae   Cardio: Regular rate, extremities well perfused  Resp: Normal work of breathing, normal respiratory rate  Neuro: alert and fully oriented. CN II-XII grossly intact. Grossly normal strength and sensation in all extremities.   MSK: no deformities.   Integumentary/Skin: no rash visualized, normal color  Psych: Flat affect, calm behavior.  Endorses SI. Thought process linear.     ED Course      Procedures            Labs Ordered and Resulted from Time of ED Arrival to Time of ED Departure   INFLUENZA A/B, RSV AND SARS-COV2 PCR - Normal       Result Value    Influenza A PCR Negative      Influenza B PCR Negative      RSV PCR Negative      SARS CoV2 PCR Negative     HCG QUALITATIVE URINE            Assessments & Plan (with Medical Decision Making)   Patient presenting with suicidal ideation. Nursing notes reviewed.  Patient also had some URI symptoms, viral swab negative for influenza, RSV, and COVID.    Mental health DEC assessment  completed with  recommending observation admission. See separate DEC note from today's date for details on the assessment.    During the observation period, the patient did not require medications for agitation, and did not require restraints/seclusion for patient and/or provider safety.     The patient was found to have a psychiatric condition that would benefit from an observation stay in the emergency department for further psychiatric stabilization and/or coordination of a safe disposition. The observation plan includes serial assessments of psychiatric condition, potential administration of medications if indicated, further disposition pending the patient's psychiatric course during the monitoring period.     Preliminary diagnosis:  Suicidal ideation    --  Kunal Manriquez MD   [facility] EMERGENCY DEPARTMENT  2/21/2025

## 2025-02-22 NOTE — PROGRESS NOTES
"Triage and Transition Services Extended Care Reassessment     Patient: Sadaf goes by \"Sadaf,\" uses she/her pronouns  Date of Service: February 22, 2025  Site of Service: [facility]                             [facility]  Patient was seen yes  Mode of Assessment: In person     Reason for Reassessment: suicidal ideation, other (see comment)    History of Patient's Original Emergency Room Encounter: PT has Hx of past diagnoses of Bipolar, BPD, ID, PTSD , RAMON, and ADHD.  PT has Hx of SI with 9 prior attempts and NSSI via biting.  PT reports Hx with command hallucinations that encouraged her to harm herself.  PT also endorses Hx of HI towards people that she knows however at the morning is not endorsing any HI.  PT reports that she is currently not seeing a therapist or psychiatrist and has not seen one since September 2024.  PT reports that she has been bouncing around different shelters since she has been kicked out of her mother's house back in September.  PT reports that Hx of multiple mental health inpatient with the latest 1 being sometime last year.  PT reports no Hx with programmatic care.    Current Patient Presentation: Writer encountered pt sleeping on [place] in hallway bed.  Writer introduced self and role to patient orienting to reassessment and offered to meet with patient in consult room for privacy.  Patient declined stating hallway was fine.  Patient denied any concerns this morning, aside from being \"really tired and just wanting to go to sleep.\"  Patient did not report any safety concerns to self or others, no AVH/AH.  Patient expressed to wanting to discharge back to the shelter where she has been staying.  Able to verify that she has been staying at the [facility] and gave writer the address to set up transportation.  Writer checked in about medications and if she needed new prescription sent to her pharmacy, patient declined.  Aside from transportation to the " shelter patient declined additional needs.    Presentation Summary: Patient appears to be at baseline, stable for discharge and is requesting to return to shelter at [facility].  Requested support with transportation, no further requests for support or referrals.    Changes Observed Since Initial Assessment: decrease in presenting symptoms, provider request    Therapeutic Interventions Provided: Engaged in safety planning, Worked on relapse prevention planning (review of stressors, early warning signs, written plan to respond to signs, and rehearse plan)., Provided positive reinforcement for progress towards goals, gains in knowledge, and application of skills previously taught., Reviewed healthy living that supports positive mental health, including looking at sleep hygiene, regular movement, nutrition, and regular socialization.    Current Symptoms: anxious avoidance, low self esteem, negativistic, apathy   impulsive, high risk behavior, displaces blame      Mental Status Exam   Affect: Appropriate  Appearance: Disheveled  Attention Span/Concentration: Attentive  Eye Contact: Variable    Fund of Knowledge: Appropriate   Language /Speech Content: Fluent  Language /Speech Volume: Soft  Language /Speech Rate/Productions: Normal, Slow  Recent Memory: Variable  Remote Memory: Variable  Mood: Apathetic  Orientation to Person: Yes   Orientation to Place: Yes  Orientation to Time of Day: Yes  Orientation to Date: Yes     Situation (Do they understand why they are here?): Yes  Psychomotor Behavior: Normal  Thought Content: Clear  Thought Form: Goal Directed    Treatment Objective(s) Addressed: rapport building, identifying an appropriate aftercare plan, safety planning, processing feelings, assessing safety, orienting the patient to therapy, identifying treatment goals, exploring obstacles to safety in the community    Patient Response to Interventions: verbalizes understanding, acceptance expressed    Progress Towards  Goals:  Patient Reports Symptoms Are: stable  Patient Progress Toward Goals: is making progress  Comment: Pt requesting d/c, appears to be at baseline. No safety concerns noted.  Next Step to Work Toward Discharge: patient ability to engage in safety planning  Ability to Engage Comment: Patient has existing safety plan, identified shelter to discharge to.    Case Management:      C-SSRS Since Last Contact:   1. Wish to be Dead (Since Last Contact): No  2. Non-Specific Active Suicidal Thoughts (Since Last Contact): No     Actual Attempt (Since Last Contact): No  Has subject engaged in non-suicidal self-injurious behavior? (Since Last Contact): No  Interrupted Attempts (Since Last Contact): No  Aborted or Self-Interrupted Attempt (Since Last Contact): No  Suicide (Since Last Contact): No     Calculated C-SSRS Risk Score (Since Last Contact): No Risk Indicated    Plan: Final Disposition / Recommended Care Path: discharge  Plan for Care reviewed with assigned Medical Provider: yes  Plan for Care Team Review: provider  Comments: Dr. Nuñez  Patient and/or validated legal guardian concurs: yes  Clinical Substantiation: Patient appears to be at baseline, chronically presenting with thoughts of suicide.  Does not report thoughts of suicide today at discharge, asking to go to [facility] shelter.  Further stay of observation, or inpatient admission has not been beneficial in the past, see patient's ED treatment plan for further details.  Patient discharged in cab to [facility] without concern.    Legal Status: Legal Status: Voluntary/Patient has signed consent for treatment    Session Status: Time session started: 0830  Time session ended: 0846  Session Duration (minutes): 16 minutes  Session Number: 1  Anticipated number of sessions or this episode of care: 1    Session Start Time: 0830  Session Stop Time: 0846  CPT codes: 10551 - Psychotherapy (with patient) - 30 (16-37*) min  Time Spent: 16 minutes      CPT code(s)  utilized: 88841 - Psychotherapy (with patient) - 30 (16-37*) min    Diagnosis:   Patient Active Problem List   Diagnosis Code    MENTAL HEALTH     Suicidal ideation R45.851    Suicidal ideations R45.851    Intellectual disability F79    Bipolar affective disorder, currently depressed, moderate (H) F31.32    Borderline personality disorder (H) F60.3    Morbid obesity (H) E66.01    Unspecified mood (affective) disorder F39    Chronic constipation K59.09    History of suicide attempt Z91.51    Hyperhidrosis R61    Major depressive disorder, recurrent, in full remission F33.42    Vitamin D deficiency E55.9    Syncope R55    Seizure-like activity (H) R56.9    Syncope, unspecified syncope type R55    Bipolar affective disorder (H) F31.9    Has access to planned means of suicide R45.851    PTSD (post-traumatic stress disorder) F43.10    Suicidal thoughts R45.851    Suicide ideation R45.851    RAMON (generalized anxiety disorder) F41.1       Primary Problem This Admission: Active Hospital Problems    *Borderline personality disorder (H)      Suicidal ideations        YOSHI Aponte   Licensed Mental Health Professional (LMHP), [facility]  [phone]

## 2025-02-22 NOTE — ED NOTES
Writer consulted with ED staff at 2037. It was determined that pt would not benefit from a DEC assessment at this time due to having difficulty staying awake.  ED will call DEC at [phone] when pt is more alert and able to participate in assessment.    Asia Ortiz

## 2025-02-22 NOTE — ED NOTES
Pt was moved to the consult room around 1800 to have her DEC assessment RN was called on phone by another staff member stating the pt was not responding to DEC  and unable to be aroused.  Pt finally was woken by another staff member within a few mins of that pt stating she was too tired to do an assessment. Pt was walked back to her seated area and was awake enough to eat her entire meal.

## 2025-02-22 NOTE — PLAN OF CARE
Sadaf AMAN Yunghunter  February 22, 2025  Plan of Care Hand-off Note     Patient Recommended Care Path: observation    Clinical Substantiation:  It is the recommendation of this clinician that pt is admitted for Observation at ED. Pt presented to ED due to SI concerns. Pt endorses continued SI with plans and intent as well as command hallucinations to harm self.  PT endorses NSSI urges via biting.  PT denies current HI and substance use.  PT reports that she has been off of her medications for the past week due to not being able to refill it and may benefit filling this.  With brief observation, monitored therapeutic treatment, and intervention of mental health symptoms in the ED, symptoms may be mitigated with potential for disposition to a less restrictive level of care than an inpatient setting. Patient is not currently on the inpatient worklist.  PT appears to be at baseline SI however may be at risk to self.  PT will benefit from a psych consult for medication overview. Next steps in care include reassessment, safety planning and connecting with outpatient resources if deemed ready to discharge.    Goals for crisis stabilization:  decrease in SI, reassement, and connecting with outpatient services    Next steps for Care Team:  psych consult, reasssement, safety planning and connecting with outpatient services if deemed ready to discharge    Treatment Objectives Addressed:  rapport building, identifying an appropriate aftercare plan, assessing safety, processing feelings    Therapeutic Interventions:  Engaged in cognitive restructuring/ reframing, looked at common cognitive distortions and challenged negative thoughts., Engaged in guided discovery, explored patient's perspectives and helped expand them through socratic dialogue.    Has a specific means been identified for suicidal.homicide actions: Yes  If yes, describe: running into traffic  Explain action steps toward mitigation: Discussed with Pt regarding this  and they are endorsing intent with this. per chart review, this appears to be pt's baseline.  Document completion of mitigation action: done  The follow up action still needed prior to discharge: psych consult for medication review/refills, reassesments, safety planing and connecting with outpatient services if deemed ready to discharge    Patient coping skills attempted to reduce the crisis:  sleeping and coming to ED                          Collateral contact information:       Legal Status: Voluntary/Patient has signed consent for treatment                                                                                                                                 Reviewed court records: yes     Psychiatry Consult: Patient has Psychiatry Consult Order    CHERI Addison

## 2025-02-22 NOTE — DISCHARGE INSTRUCTIONS
Crisis Mental Health Resources    Walk-in behavioral health support  [address] [facility]  We serve [facility] residents 18+, focusing on needs related to mental health and substance use disorder.    Walk in anytime. Open daily from 9 a.m. to 9 p.m.    Mental health resources  [facility]    Serves adults, children, adolescents, and families  Walk-in and appointment availability  Provides assessments, evaluations, therapy, medication services, and more  [address] [facility], [address]; [phone]  Learn more: Mental Health Center  [facility]  [facility]    If you're in crisis or know someone who is, [facility] can help. The [facility] mobile crisis team can come to where you are.  Call [phone] ()  Available 24 hours a day, seven days a week, 365 days a year  Learn more: [facility]: mobile crisis response  [facility]  Other mental health resources    Suicide and Crisis Lifeline: call or text 988  Crisis Text Line: Text HOME to 515997 or text AYUDA for help in [place]  [employer] ([employer] [place] [facility] Mental [facility]ness) [place]

## 2025-02-22 NOTE — ED PROVIDER NOTES
[facility] EMERGENCY DEPARTMENT ([facility])    2/22/25      ED Observation History and Physical     History     Chief Complaint   Patient presents with    Suicidal     BIB EMS from a Bus stop, patient reports SI with plan to stab herself with a pen     HPI  Sadaf Ross is a 24 year old female with a past medical history of schizoaffective disorder, bipolar 1 disorder, and borderline personality disorder who presents to the emergency department via EMS for suicidal ideation. She was discharged earlier today and reports she did not feel ready to go, and after getting home started to feel suicidal again. She believes if she goes home she would stab herself. She denies any attempts at self-harm or ingestions. No homicidal ideation, hallucinations, paranoia.    Past Medical History  Past Medical History:   Diagnosis Date    ADHD (attention deficit hyperactivity disorder)     Bipolar 1 disorder (H)     Borderline personality disorder (H)     Depressive disorder     Intellectual disability     Obesity     Syncope      Past Surgical History:   Procedure Laterality Date    APPENDECTOMY       acetaminophen (TYLENOL) 325 MG tablet  albuterol (PROAIR HFA/PROVENTIL HFA/VENTOLIN HFA) 108 (90 Base) MCG/ACT inhaler  ARIPiprazole lauroxil ER (ARISTADA) 882 MG/3.2ML intra-muscular  cetirizine (ZYRTEC) 10 MG tablet  cloNIDine (CATAPRES) 0.1 MG tablet  divalproex sodium extended-release (DEPAKOTE ER) 500 MG 24 hr tablet  FLUoxetine (PROZAC) 40 MG capsule  lactase (LACTAID) 3000 UNIT tablet  levothyroxine (SYNTHROID/LEVOTHROID) 25 MCG tablet  multivitamin w/minerals (MULTI-VITAMIN) tablet  ondansetron (ZOFRAN) 8 MG tablet  promethazine (PHENERGAN) 12.5 MG tablet  senna-docusate (SENOKOT-S/PERICOLACE) 8.6-50 MG tablet  sodium chloride 0.65 % nasal spray  traZODone (DESYREL) 100 MG tablet  Vitamin D, Cholecalciferol, 25 MCG (1000 UT) TABS      Allergies   Allergen Reactions    Penicillins Rash and Unknown     Family  History  Family History   Problem Relation Age of Onset    Diabetes Type 1 Father     Cancer Paternal Grandfather      Social History   Social History     Tobacco Use    Smoking status: Former     Current packs/day: 0.25     Average packs/day: 0.3 packs/day for 5.0 years (1.3 ttl pk-yrs)     Types: Cigarettes, Vaping Device     Passive exposure: Past    Smokeless tobacco: Never   Substance Use Topics    Alcohol use: No    Drug use: Never      Past medical history, past surgical history, medications, allergies, family history, and social history were reviewed with the patient. No additional pertinent items.   A medically appropriate review of systems was performed with pertinent positives and negatives noted in the HPI, and all other systems negative.    Physical Exam   BP: 130/83  Pulse: 61  Temp: 97.2  F (36.2  C)  Resp: 18  SpO2: 98 %    Physical Exam  General: patient is alert and oriented and in no acute distress  Head: atraumatic and normocephalic  EENT: moist mucus membranes without tonsillar erythema or exudates  Neck: supple, no cervical spinal tenderness to palpation  Cardiovascular: regular rate and rhythm without rubs, murmurs or gallops. 2+ radial pulses bilaterally.   Pulmonary: lungs clear to auscultation bilaterally   Abdomen: soft, non-tender  Musculoskeletal: normal range of motion  Neurological: normal  strength bilaterally, sensation to light touch intact   Skin: warm, dry      ED Course, Procedures, & Data      Procedures       A consult was attained from the DEC assessment service. The case was discussed with Cathie Stephens from that service.         Results for orders placed or performed during the hospital encounter of 02/21/25   Influenza A/B, RSV and SARS-CoV2 PCR (COVID-19) Nasopharyngeal     Status: Normal    Specimen: Nasopharyngeal; Swab   Result Value Ref Range    Influenza A PCR Negative Negative    Influenza B PCR Negative Negative    RSV PCR Negative Negative    SARS CoV2 PCR  Negative Negative    Narrative    Testing was performed using the Xpert Xpress CoV2/Flu/RSV Assay on the [employer] GeneXpert Instrument. This test should be ordered for the detection of SARS-CoV2, influenza, and RSV viruses in individuals with signs and symptoms of respiratory tract infection. This test is for in vitro diagnostic use under the [place] FDA for laboratories certified under CLIA to perform high or moderate complexity testing. This test has been [place] FDA cleared. A negative result does not rule out the presence of PCR inhibitors in the specimen or target RNA in concentration below the limit of detection for the assay. If only one viral target is positive but coinfection with multiple targets is suspected, the sample should be re-tested with another FDA cleared, approved, or authorized test, if coninfection would change clinical management. This test was validated by the [facility]. These laboratories are certified under the Clinical Laboratory Improvement Amendments of 1988 (CLIA-88) as qualified to perfom high complexity laboratory testing.     Medications   hydrOXYzine HCl (ATARAX) tablet 25 mg (has no administration in time range)     Labs Ordered and Resulted from Time of ED Arrival to Time of ED Departure - No data to display  No orders to display          Critical care was not performed.     Medical Decision Making  The patient's presentation was of high complexity (a chronic illness severe exacerbation, progression, or side effect of treatment).     The patient's evaluation involved:  review of external note(s) from 3+ sources (ED note, clinic note, care plan)  review of 3+ test result(s) ordered prior to this encounter (CBC, BMP, UDS)  strong consideration of a test (see separate area of note for details) that was ultimately deferred     The patient's management necessitated further care after sign-out to Dr. Hunt (see their note for further management).    Assessment & Plan     Sadaf Ross is a 24 year old female with a past medical history of schizoaffective disorder, bipolar 1 disorder, and borderline personality disorder who presents to the emergency department via EMS for suicidal ideation. She was examined as above. She was just discharged earlier today, presents with ongoing SI. She denies any attempts or ingestions and has no other complaints. I reviewed her recent visit including DEC assessment, labs including negative viral panel. I placed another DEC assessment, it was recommended she be admitted under observation status for serial exams, possible psych consultation tomorrow if needed. Patient's care was signed out to my partner Dr. Hunt.    I have reviewed the nursing notes. I have reviewed the findings, diagnosis, plan and need for follow up with the patient.    New Prescriptions    No medications on file       Final diagnoses:   Suicidal ideation       Codey Victor MD   [facility] EMERGENCY DEPARTMENT  2/22/2025     Codey Victor MD  02/22/25 0386

## 2025-02-22 NOTE — ED NOTES
"I started virtual DEC assessment with pt at 6:00 pm.  She stated that today she wanted to run in traffic and kill herself.  She stated that she \"almost did.\"  Pt stated that then she called 9-1-1.  She stated that this happened out of the blue and she did not know why.  Pt had been at [facility] on 2/19 for the same thing.  She stayed overnight and discharged the next morning.  Pt stated that she did not feel any better when discharged but the hospital told her to leave.  Pt appeared to be drowsy and was having difficulty staying awake.  She stated that she hasn't been sleeping.  She also stated that she hasn't been eating or taking care of her hygiene.  Pt fell asleep and I was unable to wake her at 6:07 pm.  I called ED staff and they were able to awake her with significant difficulty.  Thus, pt is not ready to complete assessment at this time.  "

## 2025-02-22 NOTE — PLAN OF CARE
Sadaf Ross  February 22, 2025  Plan of Care Hand-off Note     Patient Recommended Care Path: observation    Clinical Substantiation:  Pt came to the ED early this morning, endorsing SI with a plan, to stab herself in the neck with a pen or walk into traffic.  She was placed on observation status and ultimately discharged.  She returned to the ED this afternoon, with continued SI with a plan, to bite herself until she bleeds to death.  She reports that she will walk into traffic if she is not admitted.  Pt admits that she has not been on her medication for approximately one week, and admits that SI decreases or becomes less intrusive when she is medication compliant.  Pt denies AH/VH but admits to SIB, biting herself on her hand.  Although this appears to be her baseline, it does seem that it would be beneficial for her to remain in the hospital on observation status, to be re-evaluated in 24 hours.    Goals for crisis stabilization:  Decrease severity of SI    Next steps for Care Team:  Extended care to follow up.  Safety planning.  Connect with outpatient services.    Treatment Objectives Addressed:  rapport building, processing feelings, assessing safety    Therapeutic Interventions:  Engaged in safety planning, Engaged in cognitive restructuring/ reframing, looked at common cognitive distortions and challenged negative thoughts.    Has a specific means been identified for suicidal.homicide actions: Yes  If yes, describe: Run into traffic or bite herself until she bleeds to death.  Explain action steps toward mitigation: Pt reports that she feels suicidal most days, and when she is not on her medication, the symptoms worsen.  Pt in agreement that getting back on her medication is key to her survival.  Document completion of mitigation action: done  The follow up action still needed prior to discharge: Pt on observation status, for stabilization.  Extended care to follow up.    Patient coping skills  attempted to reduce the crisis:  Coming to the ED       Collateral contact information:   Yarely Geronimo [phone]    Legal Status: Voluntary/Patient has signed consent for treatment                                                   Reviewed court records: yes     Psychiatry Consult:     YOSHI Dinero

## 2025-02-22 NOTE — CONSULTS
"Diagnostic Evaluation Consultation  Crisis Assessment    Patient Name: Sadaf Ross  Age:  24 year old  Legal Sex: female  Gender Identity: female  Pronouns:   Race: White  Ethnicity: Not  or   Language: English      Patient was assessed: In person      Crisis Assessment Start Time: 0245  Crisis Assessment Stop Time: 0315  Patient location: [facility] Emergency Department                             URE-C    Referral Data and Chief Complaint  Sadaf Ross presents to the ED via EMS. Patient is presenting to the ED for the following concerns: Suicidal ideation. Factors that make the mental health crisis life threatening or complex are: Pt presents to the ED via EMS for SI with a plan, to bite herself until she bleeds to death or stab herself with a pen.  Pt was in the ED this morning at 4am, placed on observation status and ultimately discharged.  She states that if we discharge her she will run into traffic.  She denies HI/AH/VH at this time.  She does admit to SIB yesterday, biting herself on her hand, which she states is her \"usual place to bite.\"  Pt states that she knows the SI decreases when she is on her medication and agrees that it is important to get back on her meds..      Informed Consent and Assessment Methods  Explained the crisis assessment process, including applicable information disclosures and limits to confidentiality, assessed understanding of the process, and obtained consent to proceed with the assessment.  Assessment methods included conducting a formal interview with patient, review of medical records, collaboration with medical staff, and obtaining relevant collateral information from family and community providers when available.  : done     History of the Crisis   Pt carries diagnosis for Bipolar DO, BPD, ID, PTSD, RAMON, ADHD.  She has 9 previous suicide attempts by biting herself.  On a scale of 1-5 she notes that severity is \"always a 5.\" She states " "that she has a new , but she cannot remember her name.  She has psychiatry services through [facility], \"Kristina POLLOCK\" and she currently lives at [facility].  She is unable to identify what triggered the SI today, just that she thinks [facility] is what she needs.    Brief Psychosocial History  Family:  Single, Children no  Support System:  Parent(s)  Employment Status:  disabled  Source of Income:  disability  Financial Environmental Concerns:  unable to afford rent/mortgage  Current Hobbies:  social media/computer activities, music  Barriers in Personal Life:  mental health concerns, emotional concerns    Significant Clinical History  Current Anxiety Symptoms:   (NA)  Current Depression/Trauma:  thoughts of death/suicide, hopelessness  Current Somatic Symptoms:   (NA)  Current Psychosis/Thought Disturbance:     Current Eating Symptoms:   (NA)  Chemical Use History:  Alcohol: None  Benzodiazepines: None  Opiates: None  Cocaine: None  Marijuana: None  Other Use: None  Withdrawal Symptoms:  (NA)  Addictions:  (NA)   Past diagnosis:  ADHD, Bipolar Disorder, Personality Disorder, PTSD, Anxiety Disorder  Family history:  No known history of mental health or chemical health concerns  Past treatment:  Individual therapy, Psychiatric Medication Management, Case management, ARMHS/CTSS, Inpatient Hospitalization, Supportive Living Environment (group home, senior care house, etc)  Details of most recent treatment:  Pt reports she lived with her mom until September, and she has been homeless since then.  She states that \"they\" are looking for a new group home for her, and until then she will live in the shelter.  Pt was admitted o [facility] for two days 24-24 which is her most recent admission.  She states that she will not return there, as that is where her father allegedly .  Other relevant history:       Have there been any medication changes in the past two weeks:  no       Is the patient compliant with " "medications:  no  Pt reports that she was unable to refill her prescriptions, she \"forgot.\"     Collateral Information  Is there collateral information: No (Writer left message for Yarely Yunghunter, pt's mom.  [phone])        Risk Assessment  [employer] Suicide Severity Rating Scale Full Clinical Version:  Suicidal Ideation  Q6 Suicide Behavior (Lifetime): no          [employer] Suicide Severity Rating Scale Recent:   Suicidal Ideation (Recent)  Q1 Wished to be Dead (Past Month): yes  Q2 Suicidal Thoughts (Past Month): yes  Q3 Suicidal Thought Method: yes  Q4 Suicidal Intent without Specific Plan: yes  Q5 Suicide Intent with Specific Plan: yes  If yes to Q6, within past 3 months?: yes  Level of Risk per Screen: high risk  Intensity of Ideation (Recent)  Most Severe Ideation Rating (Past 1 Month): 5  Description of Most Severe Ideation (Past 1 Month): \"Bite myself until I bleed to death\"  Frequency (Past 1 Month): Many times each day  Duration (Past 1 Month): More than 8 hours/persistent or continuous  Controllability (Past 1 Month): Unable to control thoughts  Deterrents (Past 1 Month): Deterrents definitely did not stop you  Reasons for Ideation (Past 1 Month): Mostly to get attention, revenge, or a reaction from others  Suicidal Behavior (Recent)  Actual Attempt (Past 3 Months): Yes  Total Number of Actual Attempts (Past 3 Months): 5  Actual Attempt Description (Past 3 Months): Thoughts of stabbing self with a pen, walking into traffic  Has subject engaged in non-suicidal self-injurious behavior? (Past 3 Months): Yes  Total Number of Interrupted Attempts (Past 3 Months): 0  Interrupted Attempt Description (Past 3 Months): NA  Aborted or Self-Interrupted Attempt (Past 3 Months): No  Total Number of Aborted or Self-Interrupted Attempts (Past 3 Months): 0  Preparatory Acts or Behavior (Past 3 Months): No  Total Number of Preparatory Acts (Past 3 Months): 0  Preparatory Acts or Behavior Description (Past 3 Months): " NA    Environmental or Psychosocial Events: loss of a loved one, helplessness/hopelessness, unstable housing, homelessness, challenging interpersonal relationships  Protective Factors: Protective Factors: able to access care without barriers, help seeking    Does the patient have thoughts of harming others? Feels Like Hurting Others: no  Previous Attempt to Hurt Others: no  Is the patient engaging in sexually inappropriate behavior?: no  Does Patient have a known history of aggressive behavior: No  Has aggression occurred as a result of MH concerns/diagnosis: No  Does patient have history of aggression in hospital: No    Is the patient engaging in sexually inappropriate behavior?  no        Mental Status Exam   Affect: Appropriate  Appearance: Disheveled  Attention Span/Concentration: Attentive  Eye Contact: Engaged    Fund of Knowledge: Appropriate   Language /Speech Content: Fluent  Language /Speech Volume: Soft  Language /Speech Rate/Productions: Slow, Normal  Recent Memory: Variable  Remote Memory: Variable  Mood: Sad  Orientation to Person: Yes   Orientation to Place: Yes  Orientation to Time of Day: Yes  Orientation to Date: Yes     Situation (Do they understand why they are here?): Yes  Psychomotor Behavior: Normal  Thought Content: Clear  Thought Form: Intact     Mini-Cog Assessment  Number of Words Recalled:    Clock-Drawing Test:     Three Item Recall:    Mini-Cog Total Score:       Medication  Psychotropic medications:   Medication Orders - Psychiatric (From admission, onward)      None             Current Care Team  Patient Care Team:  [facility] as PCP - General (Clinic)  Chloe Alva APRN CNP as Nurse Practitioner (OB/Gyn)  Seble Jones PA-C as Physician Assistant  Fidel Neely MD as Assigned Heart and Vascular Provider    Diagnosis  Patient Active Problem List   Diagnosis Code    MENTAL HEALTH     Suicidal ideation R45.851    Suicidal ideations R45.851    Intellectual disability F79     Bipolar affective disorder, currently depressed, moderate (H) F31.32    Borderline personality disorder (H) F60.3    Morbid obesity (H) E66.01    Unspecified mood (affective) disorder F39    Chronic constipation K59.09    History of suicide attempt Z91.51    Hyperhidrosis R61    Major depressive disorder, recurrent, in full remission F33.42    Vitamin D deficiency E55.9    Syncope R55    Seizure-like activity (H) R56.9    Syncope, unspecified syncope type R55    Bipolar affective disorder (H) F31.9    Has access to planned means of suicide R45.851    PTSD (post-traumatic stress disorder) F43.10    Suicidal thoughts R45.851    Suicide ideation R45.851    RAMON (generalized anxiety disorder) F41.1    Bipolar disorder (H) F31.9    ADHD (attention deficit hyperactivity disorder) F90.9       Primary Problem This Admission  Active Hospital Problems    Bipolar disorder (H)      ADHD (attention deficit hyperactivity disorder)      RAMON (generalized anxiety disorder)      PTSD (post-traumatic stress disorder)        Clinical Summary and Substantiation of Recommendations   Clinical Substantiation:  Pt came to the ED early this morning, endorsing SI with a plan, to stab herself in the neck with a pen or walk into traffic.  She was placed on observation status and ultimately discharged.  She returned to the ED this afternoon, with continued SI with a plan, to bite herself until she bleeds to death.  She reports that she will walk into traffic if she is not admitted.  Pt admits that she has not been on her medication for approximately one week, and admits that SI decreases or becomes less intrusive when she is medication compliant.  Pt denies AH/VH but admits to SIB, biting herself on her hand.  Although this appears to be her baseline, it does seem that it would be beneficial for her to remain in the hospital on observation status, to be re-evaluated in 24 hours.    Goals for crisis stabilization:  Decrease severity of  SI    Next steps for Care Team:  Extended care to follow up.  Safety planning.  Connect with outpatient services.    Treatment Objectives Addressed:  rapport building, processing feelings, assessing safety    Therapeutic Interventions:  Engaged in safety planning, Engaged in cognitive restructuring/ reframing, looked at common cognitive distortions and challenged negative thoughts.    Has a specific means been identified for suicidal/homicide actions: Yes    If yes, describe:  Run into traffic or bite herself until she bleeds to death.    Explain action steps toward mitigation:  Pt reports that she feels suicidal most days, and when she is not on her medication, the symptoms worsen.  Pt in agreement that getting back on her medication is key to her survival.    Document completion of mitigation actions:  done    The follow up action still needed prior to discharge:  Pt on observation status, for stabilization.  Extended care to follow up.    Patient coping skills attempted to reduce the crisis:  Coming to the ED    Disposition  Recommended referrals: Medication Management, Individual Therapy        Reviewed case and recommendations with attending provider. Attending Name: Dr Codey Victor       Attending concurs with disposition: yes       Patient and/or validated legal guardian concurs with disposition:   yes       Final disposition:  observation    Legal status: Voluntary/Patient has signed consent for treatment                                                                     Reviewed court records: yes       Assessment Details   Total duration spent with the patient: 25 min     CPT code(s) utilized: 96471 - Psychotherapy (with patient) - 30 (16-37*) min    YOSHI Dinero, Psychotherapist  DEC - Triage & Transition Services  Callback: [phone]

## 2025-02-22 NOTE — ED NOTES
Bed: [facility]  Expected date: 2/22/25  Expected time: 10:43 AM  Means of arrival: Ambulance  Comments:  Yellow HEMS 24 SI

## 2025-02-23 PROBLEM — R45.851 SUICIDAL IDEATION: Status: ACTIVE | Noted: 2017-06-27

## 2025-02-23 PROCEDURE — 250N000013 HC RX MED GY IP 250 OP 250 PS 637: Performed by: EMERGENCY MEDICINE

## 2025-02-23 PROCEDURE — G0378 HOSPITAL OBSERVATION PER HR: HCPCS

## 2025-02-23 RX ORDER — FAMOTIDINE 20 MG/1
20 TABLET, FILM COATED ORAL 2 TIMES DAILY PRN
COMMUNITY

## 2025-02-23 RX ORDER — OLANZAPINE 5 MG/1
5 TABLET ORAL
Status: DISCONTINUED | OUTPATIENT
Start: 2025-02-23 | End: 2025-02-24 | Stop reason: HOSPADM

## 2025-02-23 RX ORDER — LEVOTHYROXINE SODIUM 25 UG/1
25 TABLET ORAL
Status: DISCONTINUED | OUTPATIENT
Start: 2025-02-24 | End: 2025-02-24 | Stop reason: HOSPADM

## 2025-02-23 RX ORDER — CEPHALEXIN 500 MG/1
500 CAPSULE ORAL 2 TIMES DAILY
Status: DISCONTINUED | OUTPATIENT
Start: 2025-02-23 | End: 2025-02-24

## 2025-02-23 RX ORDER — ALBUTEROL SULFATE 90 UG/1
1 INHALANT RESPIRATORY (INHALATION) EVERY 6 HOURS PRN
Status: DISCONTINUED | OUTPATIENT
Start: 2025-02-23 | End: 2025-02-24 | Stop reason: HOSPADM

## 2025-02-23 RX ORDER — DICYCLOMINE HYDROCHLORIDE 10 MG/1
10 CAPSULE ORAL 4 TIMES DAILY PRN
Status: DISCONTINUED | OUTPATIENT
Start: 2025-02-23 | End: 2025-02-24 | Stop reason: HOSPADM

## 2025-02-23 RX ORDER — PANTOPRAZOLE SODIUM 40 MG/1
40 TABLET, DELAYED RELEASE ORAL DAILY
COMMUNITY

## 2025-02-23 RX ORDER — FAMOTIDINE 20 MG/1
20 TABLET, FILM COATED ORAL 2 TIMES DAILY PRN
Status: DISCONTINUED | OUTPATIENT
Start: 2025-02-23 | End: 2025-02-24 | Stop reason: HOSPADM

## 2025-02-23 RX ORDER — HYDROXYZINE HYDROCHLORIDE 25 MG/1
25 TABLET, FILM COATED ORAL 3 TIMES DAILY PRN
COMMUNITY

## 2025-02-23 RX ORDER — CEPHALEXIN 250 MG/1
500 TABLET, FILM COATED ORAL 2 TIMES DAILY
COMMUNITY

## 2025-02-23 RX ORDER — DIVALPROEX SODIUM 500 MG/1
500 TABLET, FILM COATED, EXTENDED RELEASE ORAL AT BEDTIME
Status: DISCONTINUED | OUTPATIENT
Start: 2025-02-23 | End: 2025-02-24 | Stop reason: HOSPADM

## 2025-02-23 RX ORDER — TRAZODONE HYDROCHLORIDE 50 MG/1
50-100 TABLET ORAL AT BEDTIME
Status: DISCONTINUED | OUTPATIENT
Start: 2025-02-23 | End: 2025-02-24 | Stop reason: HOSPADM

## 2025-02-23 RX ORDER — ACETAMINOPHEN 325 MG/1
650 TABLET ORAL EVERY 6 HOURS PRN
Status: DISCONTINUED | OUTPATIENT
Start: 2025-02-23 | End: 2025-02-24 | Stop reason: HOSPADM

## 2025-02-23 RX ORDER — DICYCLOMINE HYDROCHLORIDE 10 MG/1
10 CAPSULE ORAL 4 TIMES DAILY PRN
COMMUNITY

## 2025-02-23 RX ORDER — OLANZAPINE 5 MG/1
5 TABLET ORAL PRN
COMMUNITY

## 2025-02-23 RX ORDER — CLONIDINE HYDROCHLORIDE 0.1 MG/1
0.1 TABLET ORAL AT BEDTIME
Status: DISCONTINUED | OUTPATIENT
Start: 2025-02-23 | End: 2025-02-24 | Stop reason: HOSPADM

## 2025-02-23 RX ORDER — HYDROXYZINE HYDROCHLORIDE 25 MG/1
25 TABLET, FILM COATED ORAL 3 TIMES DAILY PRN
Status: DISCONTINUED | OUTPATIENT
Start: 2025-02-23 | End: 2025-02-24 | Stop reason: HOSPADM

## 2025-02-23 RX ORDER — CETIRIZINE HYDROCHLORIDE 10 MG/1
10 TABLET ORAL 2 TIMES DAILY PRN
Status: DISCONTINUED | OUTPATIENT
Start: 2025-02-23 | End: 2025-02-24 | Stop reason: HOSPADM

## 2025-02-23 RX ADMIN — FLUOXETINE HYDROCHLORIDE 40 MG: 20 CAPSULE ORAL at 21:41

## 2025-02-23 RX ADMIN — TRAZODONE HYDROCHLORIDE 50 MG: 50 TABLET ORAL at 21:41

## 2025-02-23 RX ADMIN — DIVALPROEX SODIUM 500 MG: 500 TABLET, FILM COATED, EXTENDED RELEASE ORAL at 21:41

## 2025-02-23 RX ADMIN — HYDROXYZINE HYDROCHLORIDE 25 MG: 25 TABLET, FILM COATED ORAL at 16:29

## 2025-02-23 RX ADMIN — CEPHALEXIN 500 MG: 500 CAPSULE ORAL at 19:29

## 2025-02-23 RX ADMIN — CLONIDINE HYDROCHLORIDE 0.1 MG: 0.1 TABLET ORAL at 21:41

## 2025-02-23 ASSESSMENT — ACTIVITIES OF DAILY LIVING (ADL)
ADLS_ACUITY_SCORE: 59

## 2025-02-23 ASSESSMENT — COLUMBIA-SUICIDE SEVERITY RATING SCALE - C-SSRS
6. HAVE YOU EVER DONE ANYTHING, STARTED TO DO ANYTHING, OR PREPARED TO DO ANYTHING TO END YOUR LIFE?: NO
ATTEMPT SINCE LAST CONTACT: NO
2. HAVE YOU ACTUALLY HAD ANY THOUGHTS OF KILLING YOURSELF?: NO
TOTAL  NUMBER OF ABORTED OR SELF INTERRUPTED ATTEMPTS SINCE LAST CONTACT: NO
REASONS FOR IDEATION SINCE LAST CONTACT: MOSTLY TO GET ATTENTION, REVENGE, OR A REACTION FROM OTHERS
TOTAL  NUMBER OF INTERRUPTED ATTEMPTS SINCE LAST CONTACT: NO
1. SINCE LAST CONTACT, HAVE YOU WISHED YOU WERE DEAD OR WISHED YOU COULD GO TO SLEEP AND NOT WAKE UP?: YES
SUICIDE, SINCE LAST CONTACT: NO

## 2025-02-23 NOTE — PHARMACY-ADMISSION MEDICATION HISTORY
Pharmacist Admission Medication History    Admission medication history is complete. The information provided in this note is only as accurate as the sources available at the time of the update.    Medication reconciliation/reorder completed by provider prior to medication history? No    Information Source(s): Patient and Patient's pharmacy via in-person    Pertinent Information: Discussed medication history with patient and updated list accordingly. Patient was not a great historian of medications or last doses but endorsed taking medications within the past week. Patient also endorsed needing to complete cephalexin course that was initiated for UTI treatment. Patient unsure if taking fluoxetine 20mg vs 40mg.    Changes made to PTA medication list:  Added: Cephalexin, dicyclomine, famotidine, hydroxyzine, olanzapine, pantoprazole  Deleted: lactase  Changed:   Cetrizine from scheduled TO as needed    Allergies reviewed with patient and updates made in EHR: yes    Medication History Completed By: LILI MUHAMMAD [facility] 2/23/2025 1:12 PM    Prior to Admission medications    Medication Sig Last Dose Taking? Auth Provider Long Term End Date   acetaminophen (TYLENOL) 325 MG tablet Take 650 mg by mouth every 6 hours as needed for mild pain Past Week Yes Unknown, Entered By History     albuterol (PROAIR HFA/PROVENTIL HFA/VENTOLIN HFA) 108 (90 Base) MCG/ACT inhaler Inhale 1 puff into the lungs every 6 hours as needed for shortness of breath. Past Week Yes Reported, Patient No    cloNIDine (CATAPRES) 0.1 MG tablet Take 1 tablet (0.1 mg) by mouth at bedtime. Past Week Yes Naegele, Debra Ann, APRN CNS Yes    dicyclomine (BENTYL) 10 MG capsule Take 10 mg by mouth 4 times daily as needed (abdominal cramping). Unknown Yes Unknown, Entered By History     divalproex sodium extended-release (DEPAKOTE ER) 500 MG 24 hr tablet Take 1 tablet (500 mg) by mouth at bedtime. Past Week Yes Naegele, Debra Ann, APRN CNS Yes    famotidine (PEPCID)  20 MG tablet Take 20 mg by mouth 2 times daily as needed (GERD). Unknown Yes Unknown, Entered By History No    FLUoxetine (PROZAC) 40 MG capsule Take 1 capsule (40 mg) by mouth daily. Past Week Yes Naegele, Debra Ann, APRN CNS Yes    hydrOXYzine HCl (ATARAX) 25 MG tablet Take 25 mg by mouth 3 times daily as needed for itching. Unknown Yes Unknown, Entered By History No    levothyroxine (SYNTHROID/LEVOTHROID) 25 MCG tablet Take 25 mcg by mouth daily. Past Week Yes Unknown, Entered By History No    multivitamin w/minerals (MULTI-VITAMIN) tablet Take 1 tablet by mouth daily. Past Week Yes Reported, Patient     OLANZapine (ZYPREXA) 5 MG tablet Take 5 mg by mouth as needed (agitation/anxiety). Unknown Yes Unknown, Entered By History No    ondansetron (ZOFRAN) 8 MG tablet Take 1 tablet (8 mg) by mouth every 8 hours as needed for nausea. Past Week Yes Naegele, Debra Ann, APRN CNS No    pantoprazole (PROTONIX) 40 MG EC tablet Take 40 mg by mouth daily. Unknown Yes Unknown, Entered By History No    promethazine (PHENERGAN) 12.5 MG tablet Take 1 tablet (12.5 mg) by mouth every 6 hours as needed for nausea. Past Week Yes Kelley Campos MD     senna-docusate (SENOKOT-S/PERICOLACE) 8.6-50 MG tablet Take 1 tablet by mouth 2 times daily as needed. Past Week Yes Unknown, Entered By History     sodium chloride 0.65 % nasal spray Spray 1 spray in nostril daily as needed. Past Week Yes Reported, Patient     traZODone (DESYREL) 100 MG tablet Take  mg by mouth at bedtime. Past Week Yes Unknown, Entered By History No    Vitamin D, Cholecalciferol, 25 MCG (1000 UT) TABS Take 1,000 Units by mouth daily  Past Week Yes Reported, Patient     ARIPiprazole lauroxil ER (ARISTADA) 882 MG/3.2ML intra-muscular Inject 882 mg into the muscle every 28 days On the 22nd of each month 2/13/2025  Unknown, Entered By History     cetirizine (ZYRTEC) 10 MG tablet Take 10 mg by mouth as needed for allergies or rhinitis. Unknown  Reported, Patient

## 2025-02-23 NOTE — ED PROVIDER NOTES
Emergency Department I-PASS Sign-out      Illness Severity: Stable    Patient Summary:  24 year old female with pertinent PMH of schizoaffective disorder, bipolard isorder, SI who presented with suicidal ideation after being discharged earlier in the day for same. Denies any attempts or other complaints.    ED Course/treatment plan: Seen by DEC, medically cleared.  Patient agrees to ED obs for serial exam.    Clinical Impression:  (R45.661) Suicidal ideation    Edited by: Codey Victor MD at 2025    Action List:  Tests to Follow-up:  None    Medications Reconciled/Ordered:  Yes    ED Mental Health Boarding Order Set Used for Diet/PRNs/Other:  Yes    DEC, Extended Care, Psych Consult Orders:  DEC assessment completed.     Situational Awareness & Contingency Plannin Hour Hold Status:  Voluntary, would hold if wanting to leave.  Active Orders  N/A    Disposition:  Obs in the ED  Edited by: Codey Victor MD at 20256    Synthesis & Events after sign-out:  No acute events my shift        Sourav Nuñez MD   Emergency Medicine     Sourav Nuñez MD  [phone]

## 2025-02-23 NOTE — PROGRESS NOTES
"Triage and Transition Services Extended Care Reassessment     Patient: Sadaf goes by \"Sadaf,\" uses she/her pronouns  Date of Service: February 23, 2025  Site of Service: [facility] Emergency Department                             [facility]-C  Patient was seen    Mode of Assessment:       Reason for Reassessment: suicidal ideation    History of Patient's Original Emergency Room Encounter: Pt carries diagnosis for Bipolar DO, BPD, ID, PTSD, RAMON, ADHD.  She has 9 previous suicide attempts by biting herself.  On a scale of 1-5 she notes that severity is \"always a 5.\" She states that she has a new , but she cannot remember her name.  She has psychiatry services through [facility], \"Kristina POLLOCK\" and she currently lives at [facility].  She is unable to identify what triggered the SI today, just that she thinks [facility] is what she needs.    Current Patient Presentation: Pt is laying on bed in hallway and she does not want to go to the consult room for reassessment.   She is guarded and does not provide much information.  She has been on observation status for the last 24 hours.  She still has not stabilized.   She continues to endorse suicidal thoughts to bite her hand so that she can bleed to death or run into traffic.   She begins to cry loudly and bite her hand.    Presentation Summary: Pt continues to express suicidal thoughts with plan and intentions.   She is unwilling to engage in discussing next steps to lower level of care.    Changes Observed Since Initial Assessment: increase in presenting symptoms    Therapeutic Interventions Provided: Completed behavior chain analysis.    Current Symptoms: anxious avoidance, low self esteem, negativistic, apathy   impulsive, high risk behavior, displaces blame      Mental Status Exam   Affect: Dramatic  Appearance: Disheveled  Attention Span/Concentration: Attentive  Eye Contact: Engaged    Fund of Knowledge: Appropriate   Language /Speech Content: " Fluent  Language /Speech Volume: Soft  Language /Speech Rate/Productions: Normal  Recent Memory: Intact  Remote Memory: Intact  Mood: Sad  Orientation to Person: Yes   Orientation to Place: Yes  Orientation to Time of Day: Yes  Orientation to Date: Yes     Situation (Do they understand why they are here?): Yes  Psychomotor Behavior: Normal  Thought Content: Suicidal  Thought Form: Intact    Treatment Objective(s) Addressed: rapport building, identifying an appropriate aftercare plan, safety planning, processing feelings, assessing safety, orienting the patient to therapy, identifying treatment goals, exploring obstacles to safety in the community    Patient Response to Interventions: verbalizes understanding, acceptance expressed    Progress Towards Goals:  Patient Reports Symptoms Are: ongoing  Patient Progress Toward Goals: is not making progress  Next Step to Work Toward Discharge: patient ability to engage in safety planning    Case Management: Case Management Included: collaborating with patient's support system  Details on Collaborating with Patient's Support System: Discussed with Dr. Nuñez.  Summary of Interaction: Pt continues to endorse suicidal thoughts with plan and intentions.    C-SSRS Since Last Contact:   1. Wish to be Dead (Since Last Contact): Yes  Wish to be Dead Description (Since Last Contact): wants to be dead at baseline  2. Non-Specific Active Suicidal Thoughts (Since Last Contact): No  Most Severe Ideation Rating (Since Last Contact): 4  Description of Most Severe Ideation (Since Last Contact): wants to die  Frequency (Since Last Contact): Daily or almost daily  Duration (Since Last Contact): Less than 1 hour/some of the time  Controllability (Since Last Contact): Can control thoughts with some difficulty  Deterrents (Since Last Contact): Deterrents definitely stopped you from attempting suicide  Reasons for Ideation (Since Last Contact): Mostly to get attention, revenge, or a reaction from  others  Actual Attempt (Since Last Contact): No  Has subject engaged in non-suicidal self-injurious behavior? (Since Last Contact): No  Interrupted Attempts (Since Last Contact): No  Aborted or Self-Interrupted Attempt (Since Last Contact): No  Preparatory Acts or Behavior (Since Last Contact): No  Suicide (Since Last Contact): No     Calculated C-SSRS Risk Score (Since Last Contact): Low Risk    Plan: Final Disposition / Recommended Care Path: observation  Plan for Care reviewed with assigned Medical Provider: yes  Plan for Care Team Review: provider  Comments: Dr. Nuñez  Patient and/or validated legal guardian concurs: yes  Clinical Substantiation: Pt continues to present with suicidal thoughts with plan and intentions.  It will be important tomorrow to have coordination of care call with pts .     Legal Status: Legal Status: Voluntary/Patient has signed consent for treatment    Session Status: Time session started: 1500  Time session ended: 1510  Session Duration (minutes): 10 minutes  Session Number: 2  Anticipated number of sessions or this episode of care: 3  Date of most recent diagnostic assessment: 11/02/24    Session Start Time: 1500  Session Stop Time: 1510  CPT codes: Non-Billable  Time Spent: 10 minutes      CPT code(s) utilized: Non-Billable    Diagnosis:   Patient Active Problem List   Diagnosis Code    MENTAL HEALTH     Suicidal ideation R45.851    Suicidal ideations R45.851    Intellectual disability F79    Bipolar affective disorder, currently depressed, moderate (H) F31.32    Borderline personality disorder (H) F60.3    Morbid obesity (H) E66.01    Unspecified mood (affective) disorder F39    Chronic constipation K59.09    History of suicide attempt Z91.51    Hyperhidrosis R61    Major depressive disorder, recurrent, in full remission F33.42    Vitamin D deficiency E55.9    Syncope R55    Seizure-like activity (H) R56.9    Syncope, unspecified syncope type R55    Bipolar affective  disorder (H) F31.9    Has access to planned means of suicide R45.851    PTSD (post-traumatic stress disorder) F43.10    Suicidal thoughts R45.851    Suicide ideation R45.851    RAMON (generalized anxiety disorder) F41.1    Bipolar disorder (H) F31.9    ADHD (attention deficit hyperactivity disorder) F90.9       Primary Problem This Admission: Active Hospital Problems    Bipolar disorder (H)      ADHD (attention deficit hyperactivity disorder)      RAMON (generalized anxiety disorder)      PTSD (post-traumatic stress disorder)      Suicidal ideation        Cherelle Burns, [facility]   Licensed Mental Health Professional (LMHP), [facility]  [phone]

## 2025-02-23 NOTE — ED NOTES
Pt calm, resting in hallway bed. Reports SI with plan to bite self, no hallucinations. C/o 9/10 abd discomfort, pt refused offered interventions and stated she will let Writer know if changing her mind. Pt was given ginger ale and blanket, denied needing anything else at this time. Rounding continued.

## 2025-02-23 NOTE — CONSULTS
DEC Consult Order placed. DEC assessment completed by Cathie Stephens on 2/22/2025 at 4:07 PM. Consult acknowledged and completed.     Emi Morales

## 2025-02-23 NOTE — ED NOTES
Pt c/o abd discomfort and loose stool x4 since eating dinner. MD notified. PRN ordered and given. Pt resting in bed. Hourly rounding continued.

## 2025-02-24 VITALS
TEMPERATURE: 98.2 F | OXYGEN SATURATION: 100 % | SYSTOLIC BLOOD PRESSURE: 115 MMHG | DIASTOLIC BLOOD PRESSURE: 74 MMHG | HEART RATE: 62 BPM | RESPIRATION RATE: 16 BRPM

## 2025-02-24 PROCEDURE — G0378 HOSPITAL OBSERVATION PER HR: HCPCS

## 2025-02-24 PROCEDURE — 250N000013 HC RX MED GY IP 250 OP 250 PS 637: Performed by: EMERGENCY MEDICINE

## 2025-02-24 RX ORDER — OLANZAPINE 10 MG/1
10 TABLET, ORALLY DISINTEGRATING ORAL
Status: DISCONTINUED | OUTPATIENT
Start: 2025-02-24 | End: 2025-02-24 | Stop reason: HOSPADM

## 2025-02-24 RX ORDER — CEPHALEXIN 500 MG/1
500 CAPSULE ORAL 2 TIMES DAILY
Status: DISCONTINUED | OUTPATIENT
Start: 2025-02-24 | End: 2025-02-24 | Stop reason: HOSPADM

## 2025-02-24 RX ORDER — OLANZAPINE 10 MG/1
10 TABLET, ORALLY DISINTEGRATING ORAL ONCE
Status: DISCONTINUED | OUTPATIENT
Start: 2025-02-24 | End: 2025-02-24

## 2025-02-24 RX ADMIN — LEVOTHYROXINE SODIUM 25 MCG: 0.03 TABLET ORAL at 09:06

## 2025-02-24 RX ADMIN — CEPHALEXIN 500 MG: 500 CAPSULE ORAL at 09:06

## 2025-02-24 RX ADMIN — FLUOXETINE HYDROCHLORIDE 40 MG: 20 CAPSULE ORAL at 09:06

## 2025-02-24 ASSESSMENT — ACTIVITIES OF DAILY LIVING (ADL)
ADLS_ACUITY_SCORE: 59

## 2025-02-24 NOTE — ED NOTES
Patient reporting thoughts of smashing her head against the wall. Offered PRN atarax, declined. MD Ferro updated. Zyperxa ordered. Will attempt to offer patient shortly,

## 2025-02-24 NOTE — PROGRESS NOTES
"Triage & Transition Services, Extended Care     Therapy Progress Note    Patient: Sadaf goes by \"Sadaf,\" uses she/her pronouns  Date of Service: February 24, 2025  Site of Service: [facility] Emergency Department                               Patient was seen yes  Mode of Assessment: Virtual: iPad    Presentation Summary: Pt was seen the first time from 9:36 until 9:50 am.  She reported that she was \"the same.\"  She stated that when she wakes up she doesn't want to be here.  Pt stated that she bites herself as a coping skills.  Pt was not open to discussing any other coping skills.  She has admitted that hospitalization doesn't help her.  Pt is currently homeless, living in a shelter.  She stated that she may be moving to a group  home in [place] this weekend.  Pt then shut the iPad off at 9:50 am, prior to completing the assessment.  Thus, I met with again from 2:51 pm until 2:59 pm.  Pt would not speak or respond to my questions.  When I asked her if she was having suicidal thoughts she nodded yes.  When I asked her if she had a plan she nodded yes.  However, she would not speak or tell me the plan.  Pt then walked out of the room at 2:59 pm.    Therapeutic Intervention(s) Provided: Explored motivation for behavioral change.    Current Symptoms: anxious avoidance, negativistic, apathy, thoughts of death/suicide, helplessness, hoplessness   impulsive, high risk behavior, displaces blame      Mental Status Exam   Affect: Flat  Appearance: Disheveled  Attention Span/Concentration: Inattentive  Eye Contact: Avoidant    Fund of Knowledge: Appropriate   Language /Speech Content: Fluent  Language /Speech Volume: Soft  Language /Speech Rate/Productions: Minimally Responsive  Recent Memory: Intact  Remote Memory: Intact  Mood: Depressed  Orientation to Person: Yes   Orientation to Place:  (Did not assess)  Orientation to Time of Day:  (Did not assess)  Orientation to Date:  (Did not assess)     Situation " (Do they understand why they are here?): Yes  Psychomotor Behavior: Normal  Thought Content: Suicidal  Thought Form: Intact    Treatment Objective(s) Addressed: assessing safety, identifying an appropriate aftercare plan, identifying additional supports    Patient Response to Interventions: refused to respond    Progress Towards Goals: Patient Reports Symptoms Are: ongoing  Patient Progress Toward Goals: is not making progress  Comment: Pt reported continued suicidal thoughts with plan.  Next Step to Work Toward Discharge: patient ability to engage in safety planning  Ability to Engage Comment: Her safety plan was updated 2 days ago.    Case management:  I spoke with pt's nurse about her refusal to talk to me and complete assessment.  Pt had told nurse that she would speak to someone else but not me.  It seems that pt wanted an in-person staff.  I informed nurse that I would consult with my supervisor regarding next steps.        Plan: discharge        Clinical Substantiation: Pt would not engage in full assessment.  While in consultation with my supervisor about next steps, I saw that pt was discharging.  According to ED staff, pt became angry and demanded to leave.  It was determined by the ED doctor that she would not be held and could discharge.    Legal Status: Legal Status: Voluntary/Patient has signed consent for treatment    Session Status: Session 1 time: 9881-8085  Session 2 time: 4339-5057  Session Duration (minutes): 22 minutes  Session Number: 2  Anticipated number of sessions or this episode of care: 2  Date of most recent diagnostic assessment: 11/02/24    Time Spent: 22 minutes    CPT Code: CPT Codes: 53443 - Psychotherapy (with patient) - 30 (16-37*) min    Diagnosis:   Patient Active Problem List   Diagnosis Code    MENTAL HEALTH     Suicidal ideation R45.851    Suicidal ideations R45.851    Intellectual disability F79    Bipolar affective disorder, currently depressed, moderate (H) F31.32     Borderline personality disorder (H) F60.3    Morbid obesity (H) E66.01    Unspecified mood (affective) disorder F39    Chronic constipation K59.09    History of suicide attempt Z91.51    Hyperhidrosis R61    Major depressive disorder, recurrent, in full remission F33.42    Vitamin D deficiency E55.9    Syncope R55    Seizure-like activity (H) R56.9    Syncope, unspecified syncope type R55    Bipolar affective disorder (H) F31.9    Has access to planned means of suicide R45.851    PTSD (post-traumatic stress disorder) F43.10    Suicidal thoughts R45.851    Suicide ideation R45.851    RAMON (generalized anxiety disorder) F41.1    Bipolar disorder (H) F31.9    ADHD (attention deficit hyperactivity disorder) F90.9       Primary Problem This Admission: Active Hospital Problems    Bipolar disorder (H) F31.9      ADHD (attention deficit hyperactivity disorder) F90.9      RAMON (generalized anxiety disorder) F41.1      PTSD (post-traumatic stress disorder) F43.10      Suicidal ideation        Aleshia Ibarra LP   Licensed Mental Health Professional (LMHP), [facility]  [phone]

## 2025-02-24 NOTE — ED NOTES
"Patient yelling in hallway \"put me in restraints I dont give a fuck no more, just let me leave to kill myself, you dont care about me anyways\" attempted to talk with patient but patient continued screaming at staff and attempting to leave hospital. MD updated. Ok with patient discharging.   "

## 2025-02-24 NOTE — ED PROVIDER NOTES
Emergency Department I-PASS Sign-out      Illness Severity: Stable    Patient Summary:  24 year old female with pertinent PMH of schizoaffective disorder, bipolard isorder, SI who presented with suicidal ideation after being discharged earlier in the day for same. Denies any attempts or other complaints.    ED Course/treatment plan: Seen by DEC, medically cleared.  Patient agrees to ED obs for serial exam.    Clinical Impression:  (R45.851) Suicidal ideation    Edited by: Codey Victor MD at 2025    Action List:  Tests to Follow-up:  None    Medications Reconciled/Ordered:  Yes    ED Mental Health Boarding Order Set Used for Diet/PRNs/Other:  Yes    DEC, Extended Care, Psych Consult Orders:  DEC assessment completed.     Situational Awareness & Contingency Plannin Hour Hold Status:  Voluntary, would hold if wanting to leave.  Active Orders  N/A    Disposition:  Obs in the ED  Edited by: Codey Victor MD at 20259    Synthesis & Events after sign-out:  Patient has refused medications throughout the day today.  She is refusing extended care reassessment.  She has chronic suicidal ideation and appears to be at her baseline.  She is appropriate for outpatient management and is not benefiting from stay in the emergency department.  She does not meet criteria for hospitalization.  The patient will be discharged with plan for outpatient follow-up.        AMBROCIO CHOWDHURY MD, MD   Emergency Medicine     Ambrocio Chowdhury MD  [phone]

## 2025-02-25 ENCOUNTER — PATIENT OUTREACH (OUTPATIENT)
Dept: CARE COORDINATION | Facility: CLINIC | Age: 25
End: 2025-02-25
Payer: MEDICARE

## 2025-03-02 ENCOUNTER — HOSPITAL ENCOUNTER (EMERGENCY)
Facility: CLINIC | Age: 25
Discharge: HOME | End: 2025-03-02
Attending: EMERGENCY MEDICINE | Admitting: EMERGENCY MEDICINE
Payer: MEDICARE

## 2025-03-02 VITALS
TEMPERATURE: 98.9 F | OXYGEN SATURATION: 100 % | DIASTOLIC BLOOD PRESSURE: 75 MMHG | RESPIRATION RATE: 18 BRPM | HEART RATE: 89 BPM | SYSTOLIC BLOOD PRESSURE: 129 MMHG

## 2025-03-02 DIAGNOSIS — K60.2 ANAL FISSURE: ICD-10-CM

## 2025-03-02 PROCEDURE — 99282 EMERGENCY DEPT VISIT SF MDM: CPT

## 2025-03-02 RX ORDER — HYDROCORTISONE 25 MG/G
CREAM TOPICAL 2 TIMES DAILY
Qty: 30 G | Refills: 0 | Status: SHIPPED | OUTPATIENT
Start: 2025-03-02

## 2025-03-02 ASSESSMENT — ACTIVITIES OF DAILY LIVING (ADL)
ADLS_ACUITY_SCORE: 55
ADLS_ACUITY_SCORE: 55

## 2025-03-02 NOTE — DISCHARGE INSTRUCTIONS
Take your MiraLAX 1-2 times a day and continue your stool softener twice a day to keep your stools soft formed.  Increase your fluid intake including apple juice and water.  Take a bath and make sure you are washed well down below once a day and sit in a warm bathtub for at least 10 to 15 minutes every day.  Twice a day you will use the rectal cream that I am prescribing and put it right around the anus.  Do this for at least 7 days up to 2 weeks if needed.  If you continue to have pain in this area and it continues to Xochilt over the next week, I would recommend you follow-up with your doctor in 1 to 2 weeks.

## 2025-03-02 NOTE — ED TRIAGE NOTES
Pt from  comes in with complaints of constipation, today when straining a large blood clot came out and pain at the rectum, denies it being from her period. Pt also reports currently being treated for a UTI and has forgotten to take her ABX recently, but states she is now taking the ABX. She states the skin throughout her groin is painful and itching.     Pt also stating she has been thirsty, tired and dizzy.

## 2025-03-02 NOTE — ED PROVIDER NOTES
Emergency Department Note      History of Present Illness     Chief Complaint   Rectal Bleeding and Groin Pain      HPI   Sadaf Ross is a 24 year old female with psychiatric history who presents at the emergency department for evaluation of rectal bleeding and groin pain. The patient reports that while she was at a hotel this morning when she felt constipated, and later noticed a large blood clot that came out when straining. She endorses pain at the rectum, but denies it being from her period. Sadaf endorses recent history of UTI, for which she has 4 days left of antibiotic treatment. Of note, patient is moving to [facility] on 3/7/25.    Independent Historian   None    Review of External Notes   I reviewed note from emergency department visit with Dr. Montoya on 2/24/25 for metal health concerns.     Past Medical History     Medical History and Problem List   ADHD   Bipolar 1 disorder   Borderline personality disorder   Depressive disorder  Intellectual disability  Obesity  Syncope    Medications   Aristada  Desyrel  Senokot  Phenergan  Protonix  Zofran  Zyprexa  Synthroid  Prozac  Pepcid  Bentyl  Catapres    Surgical History   Appendectomy     Physical Exam     Patient Vitals for the past 24 hrs:   BP Temp Temp src Pulse Resp SpO2   03/02/25 1507 -- 98.9  F (37.2  C) Temporal -- -- --   03/02/25 1506 129/75 -- -- 89 18 100 %     Physical Exam  Nursing note and vitals reviewed.  Constitutional:  Alert.  Appears comfortable.   HENT:    Mucous membranes are moist.  :   Perianal exam reveals irritated skin with a posterior anal fissure with some dried blood around it.  Neurological:   Alert and appropriate. No focal weakness.  Skin:    Skin is warm and dry.  Anal fissure as noted above.    Diagnostics     Lab Results   Labs Ordered and Resulted from Time of ED Arrival to Time of ED Departure - No data to display    Imaging   No orders to display     Independent Interpretation   None    ED Course       Medications Administered   Medications - No data to display    Discussion of Management   None    ED Course   ED Course as of 03/02/25 1630   Sun Mar 02, 2025   1529 I obtained history and examined the patient as noted above     1601 I rechecked the patient and preformed rectal exam.       Additional Documentation  None    Medical Decision Making / Diagnosis     CMS Diagnoses: None    MIPS       None    MDM   Sadaf Ross is a 24 year old female comes in after having a large stool and then noted some blood.  Exam reveals an anal fissure in the posterior location with some dried blood around it.  This is the cause for her bleeding.  We talked about stool management and treating with 2.5% hydrocortisone and sitz bath's.  They will follow-up in the clinic in the next 1 to 2 weeks if not resolving.      Take your MiraLAX 1-2 times a day and continue your stool softener twice a day to keep your stools soft formed.  Increase your fluid intake including apple juice and water.  Take a bath and make sure you are washed well down below once a day and sit in a warm bathtub for at least 10 to 15 minutes every day.  Twice a day you will use the rectal cream that I am prescribing and put it right around the anus.  Do this for at least 7 days up to 2 weeks if needed.  If you continue to have pain in this area and it continues to Xochilt over the next week, I would recommend you follow-up with your doctor in 1 to 2 weeks.    Disposition   The patient was discharged.     Diagnosis     ICD-10-CM    1. Anal fissure  K60.2            Discharge Medications   Discharge Medication List as of 3/2/2025  4:05 PM            Scribe Disclosure:  I, Margarito Barbour, am serving as a scribe at 4:01 PM on 3/2/2025 to document services personally performed by Emili Ashley MD   based on my observations and the provider's statements to me.        Emili Ashley MD  03/02/25 9546

## 2025-03-02 NOTE — ED NOTES
Patient left prior to staff giving her printer rx for hydrocortisone cream. Writer attempted to call all phone numbers on chart multiple times without any answers. Rx put in sealed envelope and left at ED triage desk.

## 2025-03-06 ENCOUNTER — LAB REQUISITION (OUTPATIENT)
Dept: LAB | Facility: CLINIC | Age: 25
End: 2025-03-06
Payer: MEDICARE

## 2025-03-06 DIAGNOSIS — R30.0 DYSURIA: ICD-10-CM

## 2025-03-06 PROCEDURE — 87591 N.GONORRHOEAE DNA AMP PROB: CPT | Mod: ORL | Performed by: NURSE PRACTITIONER

## 2025-03-06 PROCEDURE — 87491 CHLMYD TRACH DNA AMP PROBE: CPT | Mod: ORL | Performed by: NURSE PRACTITIONER

## 2025-03-07 LAB
C TRACH DNA SPEC QL NAA+PROBE: NEGATIVE
N GONORRHOEA DNA SPEC QL NAA+PROBE: NEGATIVE
SPECIMEN TYPE: NORMAL
SPECIMEN TYPE: NORMAL